# Patient Record
Sex: FEMALE | Race: WHITE | NOT HISPANIC OR LATINO | Employment: OTHER | URBAN - METROPOLITAN AREA
[De-identification: names, ages, dates, MRNs, and addresses within clinical notes are randomized per-mention and may not be internally consistent; named-entity substitution may affect disease eponyms.]

---

## 2017-01-09 DIAGNOSIS — M1A.09X0 IDIOPATHIC CHRONIC GOUT OF MULTIPLE SITES WITHOUT TOPHUS: ICD-10-CM

## 2017-01-09 RX ORDER — ALLOPURINOL 100 MG/1
TABLET ORAL
Qty: 180 TABLET | Refills: 0 | Status: SHIPPED | OUTPATIENT
Start: 2017-01-09 | End: 2017-04-30 | Stop reason: SDUPTHER

## 2017-01-10 ENCOUNTER — TELEPHONE (OUTPATIENT)
Dept: CARDIOLOGY | Facility: CLINIC | Age: 77
End: 2017-01-10

## 2017-01-10 ENCOUNTER — TELEPHONE (OUTPATIENT)
Dept: INTERNAL MEDICINE | Facility: CLINIC | Age: 77
End: 2017-01-10

## 2017-01-10 NOTE — TELEPHONE ENCOUNTER
----- Message from Jo Ann Johnson sent at 1/10/2017 10:56 AM CST -----  Contact: pt  Dentist Pamela Layton with [facility] [phone] fax [phone] sent requests to both Sahrona Long and [place] for clearance to extract a tooth on Fri 1/6, and has had no response from either doctor.  Pt is in pain and would like to have this expedited.  Please call pt ASAP today at [phone] to let her what the status is.

## 2017-01-10 NOTE — TELEPHONE ENCOUNTER
I have not seen anything regarding this. I saw patient in November. I know of no contraindication to her having a tooth extraction as long as her blood pressure is under control. We do not have recent blood pressure measurement. She is also followed by Dr. Long and is taking plavix, so I defer to his recommendations regarding anticoagulant.

## 2017-01-10 NOTE — TELEPHONE ENCOUNTER
----- Message from Jo Ann Johnson sent at 1/10/2017 10:56 AM CST -----  Contact: pt  Dentist Pamela Layton with [facility] [phone] fax [phone] sent requests to both Sharona Long and [place] for clearance to extract a tooth on Fri 1/6, and has had no response from either doctor.  Pt is in pain and would like to have this expedited.  Please call pt ASAP today at [phone] to let her what the status is.

## 2017-01-10 NOTE — TELEPHONE ENCOUNTER
Please see message below. We have not received anything regarding this patient other than this message. You've only seen her once 11.16.16 for situational anxiety. Please advise.

## 2017-01-11 ENCOUNTER — OFFICE VISIT (OUTPATIENT)
Dept: INTERNAL MEDICINE | Facility: CLINIC | Age: 77
End: 2017-01-11
Payer: MEDICARE

## 2017-01-11 VITALS
BODY MASS INDEX: 31.42 KG/M2 | HEART RATE: 90 BPM | WEIGHT: 184.06 LBS | SYSTOLIC BLOOD PRESSURE: 130 MMHG | TEMPERATURE: 98 F | OXYGEN SATURATION: 97 % | HEIGHT: 64 IN | DIASTOLIC BLOOD PRESSURE: 80 MMHG

## 2017-01-11 DIAGNOSIS — E03.9 HYPOTHYROIDISM (ACQUIRED): ICD-10-CM

## 2017-01-11 DIAGNOSIS — I10 ESSENTIAL HYPERTENSION: Primary | ICD-10-CM

## 2017-01-11 DIAGNOSIS — K08.89 PAIN, DENTAL: ICD-10-CM

## 2017-01-11 PROCEDURE — 1157F ADVNC CARE PLAN IN RCRD: CPT | Mod: S$GLB,,, | Performed by: FAMILY MEDICINE

## 2017-01-11 PROCEDURE — 1160F RVW MEDS BY RX/DR IN RCRD: CPT | Mod: S$GLB,,, | Performed by: FAMILY MEDICINE

## 2017-01-11 PROCEDURE — 99214 OFFICE O/P EST MOD 30 MIN: CPT | Mod: S$GLB,,, | Performed by: FAMILY MEDICINE

## 2017-01-11 PROCEDURE — 3079F DIAST BP 80-89 MM HG: CPT | Mod: S$GLB,,, | Performed by: FAMILY MEDICINE

## 2017-01-11 PROCEDURE — 99499 UNLISTED E&M SERVICE: CPT | Mod: S$GLB,,, | Performed by: FAMILY MEDICINE

## 2017-01-11 PROCEDURE — 1159F MED LIST DOCD IN RCRD: CPT | Mod: S$GLB,,, | Performed by: FAMILY MEDICINE

## 2017-01-11 PROCEDURE — 99999 PR PBB SHADOW E&M-EST. PATIENT-LVL III: CPT | Mod: PBBFAC,,, | Performed by: FAMILY MEDICINE

## 2017-01-11 PROCEDURE — 3075F SYST BP GE 130 - 139MM HG: CPT | Mod: S$GLB,,, | Performed by: FAMILY MEDICINE

## 2017-01-11 PROCEDURE — 1126F AMNT PAIN NOTED NONE PRSNT: CPT | Mod: S$GLB,,, | Performed by: FAMILY MEDICINE

## 2017-01-11 RX ORDER — LEVOTHYROXINE SODIUM 75 UG/1
TABLET ORAL
Qty: 90 TABLET | Refills: 1 | Status: SHIPPED | OUTPATIENT
Start: 2017-01-11 | End: 2017-08-08 | Stop reason: SDUPTHER

## 2017-01-11 RX ORDER — AMOXICILLIN 500 MG/1
CAPSULE ORAL
Refills: 0 | COMMUNITY
Start: 2017-01-06 | End: 2017-02-01

## 2017-01-11 NOTE — MR AVS SNAPSHOT
Replaced by [facility]  [address] [address]  Phone: [phone]  Fax: [phone]                  Violet Swenson   2017 11:20 AM   Office Visit    Description:  Female : 1940   Provider:  Marti Coronel MD   Department:  [facility]           Reason for Visit     Dental Pain           Diagnoses this Visit        Comments    Essential hypertension    -  Primary controlled, continue current medications    Hypothyroidism (acquired)     controlled, continue synthroid    Pain, dental     may proceed with dental extraction, recommendations regarding plavix per Dr. Long, Cardiology           To Do List           Future Appointments        Provider Department Dept Phone    2017 9:40 AM LABORATORY, [address] [facility] [phone]    2017 8:30 AM Dilshad Rogers MD [facility] - Rheumatology [phone]    2017 8:20 AM Marti Coronel MD [facility] Medicine [phone]      Goals (5 Years of Data)     None      Follow-Up and Disposition     Return if symptoms worsen or fail to improve, for keep routine follow up.    Follow-up and Disposition History       These Medications        Disp Refills Start End    levothyroxine (SYNTHROID) 75 MCG tablet 90 tablet 1 2017     TAKE 1 TABLET(75 MCG) BY MOUTH BEFORE BREAKFAST    Pharmacy: [facility] [address] [place] [address] Ph #: [phone]         [facility] On Call     [facility] Nurse Care Line -  Assistance  Registered nurses in the [facility] provide clinical advisement, health education, appointment booking, and other advisory services.  Call for this free service at [phone].             Medications           Message regarding Medications     Verify the changes and/or additions to your medication regime listed below are the same as discussed with your  clinician today.  If any of these changes or additions are incorrect, please notify your healthcare provider.             Verify that the below list of medications is an accurate representation of the medications you are currently taking.  If none reported, the list may be blank. If incorrect, please contact your healthcare provider. Carry this list with you in case of emergency.           Current Medications     allopurinol (ZYLOPRIM) 100 MG tablet TAKE 2 TABLET BY MOUTH DAILY    amoxicillin (AMOXIL) 500 MG capsule TK ONE C PO TID    aspirin (ECOTRIN) 81 MG EC tablet Take 81 mg by mouth once daily.     clopidogrel (PLAVIX) 75 mg tablet TAKE 1 TABLET BY MOUTH EVERY DAY    colchicine 0.6 mg tablet TAKE 1 TABLET(0.6 MG) BY MOUTH EVERY DAY    ergocalciferol, vitamin D2, 400 unit Tab Take by mouth daily.    fluocinolone acetonide oil 0.01 % Drop Place 5 drops in ear(s) 2 (two) times daily. 5 drops in affected ear bid x 7-14 days    fluticasone (FLONASE) 50 mcg/actuation nasal spray 2 sprays by Each Nare route once daily.    gabapentin (NEURONTIN) 100 MG capsule Take 1 capsule (100 mg total) by mouth 3 (three) times daily.    hydrochlorothiazide (HYDRODIURIL) 12.5 MG Tab TAKE 1 TABLET BY MOUTH ONCE DAILY    isosorbide mononitrate (IMDUR) 30 MG 24 hr tablet TAKE 1 TABLET BY MOUTH EVERY DAY    levothyroxine (SYNTHROID) 75 MCG tablet TAKE 1 TABLET(75 MCG) BY MOUTH BEFORE BREAKFAST    meloxicam (MOBIC) 7.5 MG tablet TAKE 1 TABLET(7.5 MG) BY MOUTH DAILY AS NEEDED FOR PAIN    nitroglycerin 400 mcg/spray SprA Place onto the tongue.    ranitidine (ZANTAC) 150 MG tablet Take 150 mg by mouth nightly.    rosuvastatin (CRESTOR) 10 MG tablet Take 1 tablet (10 mg total) by mouth once daily.    sertraline (ZOLOFT) 100 MG tablet Take 1 tablet (100 mg total) by mouth once daily.    turmeric root extract 500 mg Cap Take 500 mg by mouth 2 (two) times daily.    verapamil (CALAN-SR) 120 MG CR tablet Take 1 tablet (120 mg total) by mouth  "nightly. One-half tablet by mouth nightly    ZINC ACETATE ORAL 250 mg.    meclizine (ANTIVERT) 25 mg tablet Take 1 tablet (25 mg total) by mouth 3 (three) times daily as needed for Dizziness.           Clinical Reference Information           Vital Signs - Last Recorded  Most recent update: 1/11/2017 11:28 AM by Chelsie Fox    BP Pulse Temp Ht Wt SpO2    130/80 (BP Location: Right arm, Patient Position: Sitting) 90 97.7 °F (36.5 °C) 5' 4" (1.626 m) 83.5 kg (184 lb 1.4 oz) 97%    BMI                31.6 kg/m2          Blood Pressure          Most Recent Value    BP  130/80      Allergies as of 1/11/2017     Corticosteroids (Glucocorticoids)    Oxycodone      Immunizations Administered on Date of Encounter - 1/11/2017     None      "

## 2017-01-11 NOTE — PROGRESS NOTES
Subjective:       Patient ID: Violet Swenson is a 76 y.o. female.    Chief Complaint: Dental Pain    HPI Comments: Patient presents to clinic today for clearance for tooth extraction with Dr. Pamela Layton. She also requests refill of synthroid. In addition, she reports still having trouble with painful corn on her foot. She saw podiatry and they advised filing down as needed. She states that is uncomfortable.    Review of Systems   Constitutional: Negative for chills, fatigue, fever and unexpected weight change.   HENT: Positive for dental problem.    Eyes: Negative for visual disturbance.   Respiratory: Negative for shortness of breath.    Cardiovascular: Negative for chest pain.   Musculoskeletal: Negative for myalgias.   Neurological: Negative for headaches.       Objective:      Physical Exam   Constitutional: She is oriented to person, place, and time. She appears well-developed and well-nourished. No distress.   HENT:   Head: Normocephalic and atraumatic.   Eyes: Conjunctivae and EOM are normal. Pupils are equal, round, and reactive to light.   Neurological: She is alert and oriented to person, place, and time.   Psychiatric: She has a normal mood and affect.   Vitals reviewed.      Assessment:       1. Essential hypertension    2. Hypothyroidism (acquired)    3. Pain, dental        Plan:   Violet was seen today for dental pain.    Diagnoses and all orders for this visit:    Essential hypertension  Comments:  controlled, continue current medications    Hypothyroidism (acquired)  Comments:  controlled, continue synthroid  Orders:  -     levothyroxine (SYNTHROID) 75 MCG tablet; TAKE 1 TABLET(75 MCG) BY MOUTH BEFORE BREAKFAST    Pain, dental  Comments:  may proceed with dental extraction, recommendations regarding plavix per Dr. Long, Cardiology    - advised to notify Dr. Mehta's staff regarding issue with corn on foot to see if they can offer other options.

## 2017-01-17 ENCOUNTER — TELEPHONE (OUTPATIENT)
Dept: CARDIOLOGY | Facility: CLINIC | Age: 77
End: 2017-01-17

## 2017-01-17 NOTE — TELEPHONE ENCOUNTER
Spoke with patient--states needs clearance to have tooth extraction done at [facility]--advised patient that I will send message to Dr. Long and give her a call back when he responds--patient verbalizes understanding

## 2017-01-17 NOTE — TELEPHONE ENCOUNTER
[facility] ([phone] ph  [phone] fax) sent a medical clearance form for dental treatment (tooth extractions) needs to discontinue Plavix 4-5 days prior to extraction--needs to know if any other special precautions that needs to be taken and any known risks during procedure--please advise. Thanks.

## 2017-01-17 NOTE — TELEPHONE ENCOUNTER
----- Message from Gayatri España sent at 1/17/2017  9:29 AM CST -----  Contact: pt  Pt would like to speak to the nurse please today(pt will elaborate)....[phone]

## 2017-01-23 DIAGNOSIS — M15.9 PRIMARY OSTEOARTHRITIS INVOLVING MULTIPLE JOINTS: ICD-10-CM

## 2017-01-23 RX ORDER — MELOXICAM 7.5 MG/1
TABLET ORAL
Qty: 90 TABLET | Refills: 0 | Status: SHIPPED | OUTPATIENT
Start: 2017-01-23 | End: 2017-05-23 | Stop reason: SDUPTHER

## 2017-01-25 ENCOUNTER — LAB VISIT (OUTPATIENT)
Dept: LAB | Facility: HOSPITAL | Age: 77
End: 2017-01-25
Attending: INTERNAL MEDICINE
Payer: MEDICARE

## 2017-01-25 ENCOUNTER — TELEPHONE (OUTPATIENT)
Dept: RHEUMATOLOGY | Facility: CLINIC | Age: 77
End: 2017-01-25

## 2017-01-25 DIAGNOSIS — M1A.09X0 IDIOPATHIC CHRONIC GOUT OF MULTIPLE SITES WITHOUT TOPHUS: ICD-10-CM

## 2017-01-25 LAB
ALBUMIN SERPL BCP-MCNC: 3.7 G/DL
ALP SERPL-CCNC: 72 U/L
ALT SERPL W/O P-5'-P-CCNC: 26 U/L
ANION GAP SERPL CALC-SCNC: 8 MMOL/L
AST SERPL-CCNC: 32 U/L
BILIRUB SERPL-MCNC: 0.4 MG/DL
BUN SERPL-MCNC: 25 MG/DL
CALCIUM SERPL-MCNC: 9.5 MG/DL
CHLORIDE SERPL-SCNC: 109 MMOL/L
CO2 SERPL-SCNC: 26 MMOL/L
CREAT SERPL-MCNC: 1.1 MG/DL
EST. GFR  (AFRICAN AMERICAN): 57 ML/MIN/1.73 M^2
EST. GFR  (NON AFRICAN AMERICAN): 49 ML/MIN/1.73 M^2
GLUCOSE SERPL-MCNC: 93 MG/DL
POTASSIUM SERPL-SCNC: 5.6 MMOL/L
PROT SERPL-MCNC: 7 G/DL
SODIUM SERPL-SCNC: 143 MMOL/L
URATE SERPL-MCNC: 5.6 MG/DL

## 2017-01-25 PROCEDURE — 36415 COLL VENOUS BLD VENIPUNCTURE: CPT

## 2017-01-25 PROCEDURE — 80053 COMPREHEN METABOLIC PANEL: CPT

## 2017-01-25 PROCEDURE — 84550 ASSAY OF BLOOD/URIC ACID: CPT

## 2017-01-25 NOTE — TELEPHONE ENCOUNTER
----- Message from Dilshad Rogers MD sent at 1/25/2017 11:25 AM CST -----  [facility] labs.

## 2017-01-30 DIAGNOSIS — M79.2 NEUROPATHIC PAIN OF FOOT, RIGHT: ICD-10-CM

## 2017-01-30 RX ORDER — GABAPENTIN 100 MG/1
CAPSULE ORAL
Qty: 90 CAPSULE | Refills: 0 | Status: SHIPPED | OUTPATIENT
Start: 2017-01-30 | End: 2017-03-01 | Stop reason: SDUPTHER

## 2017-02-01 ENCOUNTER — OFFICE VISIT (OUTPATIENT)
Dept: PHYSICAL MEDICINE AND REHAB | Facility: CLINIC | Age: 77
End: 2017-02-01
Payer: MEDICARE

## 2017-02-01 ENCOUNTER — OFFICE VISIT (OUTPATIENT)
Dept: RHEUMATOLOGY | Facility: CLINIC | Age: 77
End: 2017-02-01
Payer: MEDICARE

## 2017-02-01 VITALS
SYSTOLIC BLOOD PRESSURE: 120 MMHG | BODY MASS INDEX: 31.92 KG/M2 | DIASTOLIC BLOOD PRESSURE: 74 MMHG | HEIGHT: 64 IN | RESPIRATION RATE: 14 BRPM | WEIGHT: 187 LBS | HEART RATE: 92 BPM

## 2017-02-01 VITALS
DIASTOLIC BLOOD PRESSURE: 85 MMHG | HEART RATE: 90 BPM | WEIGHT: 187.38 LBS | BODY MASS INDEX: 32.17 KG/M2 | SYSTOLIC BLOOD PRESSURE: 138 MMHG

## 2017-02-01 DIAGNOSIS — R29.898 ARM WEAKNESS: ICD-10-CM

## 2017-02-01 DIAGNOSIS — Z79.1 NSAID LONG-TERM USE: ICD-10-CM

## 2017-02-01 DIAGNOSIS — M79.18 MYOFASCIAL PAIN: Primary | ICD-10-CM

## 2017-02-01 DIAGNOSIS — M65.842 STENOSING TENOSYNOVITIS OF FINGER OF LEFT HAND: Primary | ICD-10-CM

## 2017-02-01 DIAGNOSIS — M15.9 PRIMARY OSTEOARTHRITIS INVOLVING MULTIPLE JOINTS: ICD-10-CM

## 2017-02-01 DIAGNOSIS — M1A.09X0 IDIOPATHIC CHRONIC GOUT OF MULTIPLE SITES WITHOUT TOPHUS: ICD-10-CM

## 2017-02-01 PROCEDURE — 1157F ADVNC CARE PLAN IN RCRD: CPT | Mod: S$GLB,,, | Performed by: INTERNAL MEDICINE

## 2017-02-01 PROCEDURE — 99999 PR PBB SHADOW E&M-EST. PATIENT-LVL III: CPT | Mod: PBBFAC,,, | Performed by: PHYSICAL MEDICINE & REHABILITATION

## 2017-02-01 PROCEDURE — 1157F ADVNC CARE PLAN IN RCRD: CPT | Mod: S$GLB,,, | Performed by: PHYSICAL MEDICINE & REHABILITATION

## 2017-02-01 PROCEDURE — 3079F DIAST BP 80-89 MM HG: CPT | Mod: S$GLB,,, | Performed by: INTERNAL MEDICINE

## 2017-02-01 PROCEDURE — 99499 UNLISTED E&M SERVICE: CPT | Mod: S$GLB,,, | Performed by: INTERNAL MEDICINE

## 2017-02-01 PROCEDURE — 99999 PR PBB SHADOW E&M-EST. PATIENT-LVL III: CPT | Mod: PBBFAC,,, | Performed by: INTERNAL MEDICINE

## 2017-02-01 PROCEDURE — 1159F MED LIST DOCD IN RCRD: CPT | Mod: S$GLB,,, | Performed by: INTERNAL MEDICINE

## 2017-02-01 PROCEDURE — 1125F AMNT PAIN NOTED PAIN PRSNT: CPT | Mod: S$GLB,,, | Performed by: INTERNAL MEDICINE

## 2017-02-01 PROCEDURE — 3074F SYST BP LT 130 MM HG: CPT | Mod: S$GLB,,, | Performed by: PHYSICAL MEDICINE & REHABILITATION

## 2017-02-01 PROCEDURE — 1160F RVW MEDS BY RX/DR IN RCRD: CPT | Mod: S$GLB,,, | Performed by: INTERNAL MEDICINE

## 2017-02-01 PROCEDURE — 20550 NJX 1 TENDON SHEATH/LIGAMENT: CPT | Mod: LT,S$GLB,, | Performed by: INTERNAL MEDICINE

## 2017-02-01 PROCEDURE — 99214 OFFICE O/P EST MOD 30 MIN: CPT | Mod: 25,S$GLB,, | Performed by: INTERNAL MEDICINE

## 2017-02-01 PROCEDURE — 3078F DIAST BP <80 MM HG: CPT | Mod: S$GLB,,, | Performed by: PHYSICAL MEDICINE & REHABILITATION

## 2017-02-01 PROCEDURE — 1160F RVW MEDS BY RX/DR IN RCRD: CPT | Mod: S$GLB,,, | Performed by: PHYSICAL MEDICINE & REHABILITATION

## 2017-02-01 PROCEDURE — 99204 OFFICE O/P NEW MOD 45 MIN: CPT | Mod: S$GLB,,, | Performed by: PHYSICAL MEDICINE & REHABILITATION

## 2017-02-01 PROCEDURE — 1125F AMNT PAIN NOTED PAIN PRSNT: CPT | Mod: S$GLB,,, | Performed by: PHYSICAL MEDICINE & REHABILITATION

## 2017-02-01 PROCEDURE — 1159F MED LIST DOCD IN RCRD: CPT | Mod: S$GLB,,, | Performed by: PHYSICAL MEDICINE & REHABILITATION

## 2017-02-01 PROCEDURE — 3075F SYST BP GE 130 - 139MM HG: CPT | Mod: S$GLB,,, | Performed by: INTERNAL MEDICINE

## 2017-02-01 RX ORDER — TRIAMCINOLONE ACETONIDE 40 MG/ML
20 INJECTION, SUSPENSION INTRA-ARTICULAR; INTRAMUSCULAR
Status: COMPLETED | OUTPATIENT
Start: 2017-02-01 | End: 2017-02-01

## 2017-02-01 RX ORDER — TIZANIDINE 2 MG/1
2 TABLET ORAL NIGHTLY PRN
Qty: 30 TABLET | Refills: 1 | Status: SHIPPED | OUTPATIENT
Start: 2017-02-01 | End: 2017-03-30

## 2017-02-01 RX ADMIN — TRIAMCINOLONE ACETONIDE 20 MG: 40 INJECTION, SUSPENSION INTRA-ARTICULAR; INTRAMUSCULAR at 09:02

## 2017-02-01 NOTE — PROGRESS NOTES
RHEUMATOLOGY CLINIC FOLLOW UP VISIT  Chief complaints:-  My finger gets stuck when I make a fist and hurts. My thumb hurts too.     HPI:-  Violet Zhao a 76 y.o. pleasant female comes in for a follow up visit with above chief complaints. She has chronic non tophaceous gout, osteoarthritis and peripheral neuropathy. Her peripheral neuropathy responded well to low dose gabapentin. She still uses prn NSAID's for OA related pain over joints including her thumbs and large joints. She complains of worsening pain and triggering of the middle finger of left hand since the past week. She denies any injury. Rest of the history remains the same.       Review of Systems   Constitutional: Negative for chills, fever and weight loss.   HENT: Negative for ear discharge, ear pain, hearing loss, nosebleeds and sore throat.    Eyes: Negative for blurred vision, double vision, photophobia, discharge and redness.   Respiratory: Negative for cough, hemoptysis, sputum production and shortness of breath.    Cardiovascular: Negative for chest pain, palpitations and claudication.   Gastrointestinal: Negative for abdominal pain, constipation, diarrhea, melena, nausea and vomiting.   Genitourinary: Negative for dysuria, frequency, hematuria and urgency.   Musculoskeletal: Positive for back pain and joint pain. Negative for falls, myalgias and neck pain.   Skin: Negative for itching and rash.   Neurological: Negative for dizziness, tremors, sensory change, speech change, focal weakness, seizures, loss of consciousness, weakness and headaches.   Endo/Heme/Allergies: Negative for environmental allergies. Does not bruise/bleed easily.   Psychiatric/Behavioral: Negative for hallucinations and memory loss. The patient does not have insomnia.        Past Medical History   Diagnosis Date    Arthritis     Coronary artery disease     Depression     GERD (gastroesophageal reflux  disease)     Gout, arthritis     Hyperlipidemia     Hypertension     Hypothyroidism     Lung nodule 2014     RML--stable    Pneumonia        Past Surgical History   Procedure Laterality Date    Cholecystectomy      Tonsillectomy      Appendectomy      Hernia repair      Gastric bypass      Total knee arthroplasty Bilateral     Coronary stent placement  02/05/2014    Cardiac pacemaker placement  01/22/2015    Coronary angioplasty  02/2014        Social History   Substance Use Topics    Smoking status: Former Smoker    Smokeless tobacco: None    Alcohol use No       Family History   Problem Relation Age of Onset    Heart disease Mother     Hypertension Mother     Stomach cancer Father     Pancreatic cancer Sister     Stomach cancer Brother        Review of patient's allergies indicates:   Allergen Reactions    Corticosteroids (glucocorticoids) Nausea Only and Other (See Comments)     Stomach pain, dizziness, headache    Oxycodone Other (See Comments)     Blood pressure dropped           Physical examination:-    Vitals:    02/01/17 0848   BP: 138/85   Pulse: 90   Weight: 85 kg (187 lb 6.3 oz)   PainSc:   9   PainLoc: Generalized       Physical Exam   Constitutional: She is oriented to person, place, and time and well-developed, well-nourished, and in no distress.   HENT:   Head: Normocephalic.   Mouth/Throat: Oropharynx is clear and moist.   Eyes: Conjunctivae are normal. Pupils are equal, round, and reactive to light.   Neck: Normal range of motion. Neck supple.   Cardiovascular: Normal rate and intact distal pulses.    Pulmonary/Chest: Effort normal. No respiratory distress. She exhibits no tenderness.   Abdominal: Soft. There is no tenderness.   Musculoskeletal:   Minimal Heberden's and Laura's nodes in her hands and crepitus in large joints suggestive of osteoarthritis. Tenderness over A1 pulley of left middle finger.    Neurological: She is alert and oriented to person, place, and  time. No cranial nerve deficit.   Skin: Skin is warm. She is not diaphoretic. No erythema.   Psychiatric: Affect and judgment normal.   Nursing note and vitals reviewed.      Medication List with Changes/Refills   New Medications    BAICALIN-CATECHIN 500 MG CAP    Take 500 mg by mouth 2 (two) times daily.   Current Medications    ALLOPURINOL (ZYLOPRIM) 100 MG TABLET    TAKE 2 TABLET BY MOUTH DAILY    AMOXICILLIN (AMOXIL) 500 MG CAPSULE    TK ONE C PO TID    ASPIRIN (ECOTRIN) 81 MG EC TABLET    Take 81 mg by mouth once daily.     CLOPIDOGREL (PLAVIX) 75 MG TABLET    TAKE 1 TABLET BY MOUTH EVERY DAY    COLCHICINE 0.6 MG TABLET    TAKE 1 TABLET(0.6 MG) BY MOUTH EVERY DAY    ERGOCALCIFEROL, VITAMIN D2, 400 UNIT TAB    Take by mouth daily.    FLUOCINOLONE ACETONIDE OIL 0.01 % DROP    Place 5 drops in ear(s) 2 (two) times daily. 5 drops in affected ear bid x 7-14 days    FLUTICASONE (FLONASE) 50 MCG/ACTUATION NASAL SPRAY    2 sprays by Each Nare route once daily.    GABAPENTIN (NEURONTIN) 100 MG CAPSULE    TAKE 1 CAPSULE(100 MG) BY MOUTH THREE TIMES DAILY    HYDROCHLOROTHIAZIDE (HYDRODIURIL) 12.5 MG TAB    TAKE 1 TABLET BY MOUTH ONCE DAILY    ISOSORBIDE MONONITRATE (IMDUR) 30 MG 24 HR TABLET    TAKE 1 TABLET BY MOUTH EVERY DAY    LEVOTHYROXINE (SYNTHROID) 75 MCG TABLET    TAKE 1 TABLET(75 MCG) BY MOUTH BEFORE BREAKFAST    MECLIZINE (ANTIVERT) 25 MG TABLET    Take 1 tablet (25 mg total) by mouth 3 (three) times daily as needed for Dizziness.    MELOXICAM (MOBIC) 7.5 MG TABLET    TAKE 1 TABLET BY MOUTH ONCE DAILY AS NEEDED FOR PAIN    NITROGLYCERIN 400 MCG/SPRAY SPRA    Place onto the tongue.    RANITIDINE (ZANTAC) 150 MG TABLET    Take 150 mg by mouth nightly.    ROSUVASTATIN (CRESTOR) 10 MG TABLET    Take 1 tablet (10 mg total) by mouth once daily.    SERTRALINE (ZOLOFT) 100 MG TABLET    Take 1 tablet (100 mg total) by mouth once daily.    TURMERIC ROOT EXTRACT 500 MG CAP    Take 500 mg by mouth 2 (two) times daily.     VERAPAMIL (CALAN-SR) 120 MG CR TABLET    Take 1 tablet (120 mg total) by mouth nightly. One-half tablet by mouth nightly    ZINC ACETATE ORAL    250 mg.       Assessment/Plans:-  # Idiopathic chronic gout of multiple sites without tophus  Stable. No flare ups. Uric acid at goal on current dose of allopurinol. Continue colchicine for now to prevent attacks. Will consider to discontinue it in future visit.     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 Start stretching exercises after 48 hours.   - triamcinolone acetonide injection 20 mg; 0.5 mLs (20 mg total) by Other route one time.    # Primary osteoarthritis involving multiple joints  On long term NSAID's. Not responding to low dose mobic. Try limbrel to reduce chances of Renal/GI/CVD side effects.   - baicalin-catechin 500 mg Cap; Take 500 mg by mouth 2 (two) times daily.  Dispense: 60 each; Refill: 5    # NSAID long-term use  No response to mobic. Stop prn OTC NSAID use . Try limbrel .  - baicalin-catechin 500 mg Cap; Take 500 mg by mouth 2 (two) times daily.  Dispense: 60 each; Refill: 5    PROCEDURE NOTE:-  Name of the procedure: Left third flexor tendon- trigger finger injection.   Patient consent:   Indication, risks(including skin depigmentation, fat atrophy, infection, bleeding, nerve or tendon injury) and alternative to the procedure were explained to to the patient. Patient was given opportunity to ask questions . Then patient gave a verbal consent for the procedure.   Pertinent lab values: None indicated  Type of anesthesia: 2% Lidocaine   Description of procedure : Using sterile technique, the skin over the base of left third finger - just above the A1 pulley was cleaned with alcohol swab and chlorhexidine solution. Then 1/2 cc of lidocaine and 1/2 cc of kenalog was injected into the subcutaneous space with immediate pain relief for the patient. She started making fists almost instantly without any pain.  Complication:  None  Estimated blood loss: None  Disposition: Patient tolerated the procedure without any complains and the site was dressed.There were no complications.  Discharge instructions of icing and stretching given.      # Return in about 6 months (around 8/1/2017).    Disclaimer: This note was prepared using voice recognition system and is likely to have sound alike errors and is not proof read.  Please call me with any questions.

## 2017-02-01 NOTE — LETTER
February 1, 2017      Dilshad Rogers MD  [address] [place] [address]           [facility] - Physiatry  [address] [address]  Phone: [phone]  Fax: [phone]          Patient: Violet Swenson   MR Number: [id number]   YOB: 1940   Date of Visit: 2/1/2017       Dear Dr. Dilshad Rogers:    Thank you for referring Violet Swenson to me for evaluation. Attached you will find relevant portions of my assessment and plan of care.    If you have questions, please do not hesitate to call me. I look forward to following Violet Swenson along with you.    Sincerely,    Lorraine Rizo MD    Enclosure  CC:  No Recipients    If you would like to receive this communication electronically, please contact [email or web address] or [phone] to request more information on [facility] [employer] access.    For providers and/or their staff who would like to refer a patient to [facility], please contact us through our one-stop-shop provider referral line, [facility], at [phone].    If you feel you have received this communication in error or would no longer like to receive these types of communications, please e-mail [email or web address]

## 2017-02-01 NOTE — PATIENT INSTRUCTIONS
Myofascial Pain Syndrome: Fibrositis  Your pain is caused by a state of chronic muscle tension. This condition is called by various names: myofascial pain, fibrositis and trigger point pain. This can also be due to mechanical stress (such as working at a computer terminal for long periods; or work that requires repetitive motions of the arms or hands) or emotional stress (such as problems on the job or in your personal life). Sometimes there is no obvious cause. The pain can occur in the area of the muscle spasm or at a site distant to it. For example, spasm of a neck muscle can cause headache. Spasm of the muscle near the shoulder blade can cause pain shooting down the arm.  Home Care:  · Try to identify the factors that may be causing your problem and change them:  ¨ If you feel that emotional stress is a cause of your pain, learn methods to deal more effectively with the stress in your life. These may include regular exercise, muscle relaxation techniques, meditation or simply taking time out for yourself. Consult your doctor or go to a local bookstore and review the many books and tapes available on the subject of stress reduction.  ¨ If you feel that physical stress is a cause for your pain, try to modify any poor work habits.  · You may use acetaminophen (Tylenol) or ibuprofen (Motrin, Advil) to control pain, unless another medicine was prescribed. [NOTE: If you have chronic liver or kidney disease or ever had a stomach ulcer or GI bleeding, talk with your doctor before using these medicines.]  · The use of heat to the muscle (hot compress or heating pad) will be helpful to reduce muscle spasm. Some persons get relief with ice packs. Apply an ice pack (crushed or cubed ice in a plastic bag, wrapped in a towel) for 20 minutes at a time as needed. Use the method that feels best to you.  · Massaging the trigger point and stretching out the muscle are an important parts of prevention and treatment. Trigger point  massage can be done by first applying heat to the area to warm and prepare the muscle. Have someone apply steady thumb pressure directly on the knot in the muscle (the most tender point) for 30 seconds. Release the pressure, then massage the surrounding muscle. Repeat the process, applying more pressure to the trigger point each time. Do this up to the limit of pain. With each treatment, the trigger point should become less tender and the pain should decrease. You can apply local pressure to trigger points in the back by lying on the floor with a tennis ball under the trigger point.  Follow Up  with your doctor as advised or if not improving within the next week. It may be necessary for you to receive physical therapy if you do not respond to home treatment alone.  Get Prompt Medical Attention  if any of the following occur:  · If your trigger point is in the chest muscles, observe for pain that becomes more severe, lasts longer, or spreads into your shoulder/arm, neck or back; you develop trouble breathing, sweating, nausea or vomiting in association with chest pain  · If you develop weakness or numbness in an extremity  · If your pain worsens, regardless of its location  © 5523-2745 [employer]. [address] [place] [address]. All rights reserved. This information is not intended as a substitute for professional medical care. Always follow your healthcare professional's instructions.          Trigger Point Injection  The cause of your muscle pain or spasms may be one or more trigger points. Your health care provider may decide to inject the painful spots to relax the muscle. This can help relieve your pain. Relaxing the muscle can also make movement easier. You may then be able to exercise to strengthen the muscle and help it heal.    What is a trigger point?  A trigger point is a tight, painful knot of muscle fiber. It can form where a muscle is strained or injured. The knot can  sometimes be felt under the skin. A trigger point is very tender to the touch. Pain may also spread to other parts of the affected muscle. Muscles around a knee, shoulder blade, or other bones are prone to trigger points. This is because these muscles are more likely to be injured.    About the injections  Any muscle in the body can have one or more trigger points. Several injections may be needed in each trigger point to best relieve pain. These injections may be given in sessions about 2 weeks apart, depending on the preference of your health care provider. In some cases, you may not feel much change in your symptoms until after the third injection.     © 5948-7072 [employer]. [address] [place] [address]. All rights reserved. This information is not intended as a substitute for professional medical care. Always follow your healthcare professional's instructions.            Your Neck Muscles  The muscles in the neck and shoulders need to be strong to hold the neck and head in place. These muscles also help move the neck and shoulders. Your health care provider can recommend exercises to help stretch and strengthen your neck muscles.    © 0224-8105 [employer]. [address] [place] [address]. All rights reserved. This information is not intended as a substitute for professional medical care. Always follow your healthcare professional's instructions.          Neck Problems: Relieving Your Symptoms  The first goal of treatment is to relieve your symptoms. Your health care provider may recommend self-care treatments. These include resting, applying ice and heat, taking medication, and doing exercises. Your health care provider may also recommend that you see a physical therapist, who can teach you ways to care for and strengthen your neck.    Self-Care Treatments  Pain can end quickly or last awhile. Either way, youll want relief as soon as possible. Your health  care provider can tell you which treatments to do at home to help relieve your pain.  · Lying down for a short time takes pressure from the head off the neck.  · Ice and heat can help reduce pain. To bring down swelling, rest an ice pack wrapped in a thin towel on your neck for 15 minutes. To relax sore muscles, apply a warm, wet towel to the area. Or take a warm bath or shower.  · Over-the-counter medications, such as ibuprofen, naproxen, and aspirin, can help reduce pain and swelling. Acetaminophen can help relieve pain. Use these only as directed.  · Exercises can relax muscles and prevent stiffness. To prepare, drape a warm, wet towel around your neck and shoulders for 5 minutes. Remove the towel. Then do any exercises recommended to you by your health care provider.  Physical Therapy  If self-care treatments arent helping relieve neck pain, your health care provider may suggest one or more sessions of physical therapy. Physical therapy is performed by a specialist trained to treat injuries. Your physical therapist (PT) will teach you how to strengthen muscles, improve the spines alignment, and help you move properly. Treatment methods used in physical therapy may include:  · Heat. A special heating pad called a neck pack may be applied to your neck.  · Exercises. Your PT will teach you exercises to help strengthen your neck and improve its range of motion.  · Joint mobilization. The PT gently moves your vertebrae to help restore motion in your neck joints and reduce neck pain.  · Soft tissue mobilization. The PT massages and stretches the muscles in your neck and shoulders.  · Electrical stimulation. Electrical impulses are sent into your neck. This helps reduce soreness and inflammation.  · Education in body mechanics. The PT shows you ways to position and move your body that protect the neck.  Other Treatments  If physical therapy doesnt relieve your neck pain, your health care provider may suggest other  treatments. For example, medications or injections can help relieve pain and swelling. In some cases, surgery may be needed to treat neck problems.  © 0929-2007 [employer]. [address] [place] [address], [place] [address]. All rights reserved. This information is not intended as a substitute for professional medical care. Always follow your healthcare professional's instructions.          Understanding Neck Problems       If you suffer from neck pain, youre not alone. Many people have neck pain at some point in their lives. Problems such as poor posture, injury, and wear and tear can lead to neck pain. Your health care provider will work with you to find the treatment thats best for your neck.  Types of Neck Problems  The following problems can cause pain or injury in your neck:  · Strains and sprains: Strains (stretched or torn muscles) and sprains (stretched or torn ligaments) can cause neck pain. Strains and sprains can occur during an accident, or when you overuse your neck through repetitive motion. They can also cause your muscles and ligaments to become inflamed (swollen and painful).  · Whiplash and other injuries: Whiplash can result when an impact throws your head, forcing your neck too far forward (hyperflexion), then too far backward (hyperextension). When combined, the two motions can cause a painful injury to different parts of your neck, such as muscles, ligaments, or joints. The most common cause of whiplash is a car accident. But it can also happen during a fall or sports injury.  · Weakened disks: A simple action, such as a sneeze or a cough, can cause one of your disks to bulge (herniate). A herniated disk can put pressure on your nerve and cause pain. Over time, disks can also thin out (degenerate). Flattened disks dont cushion vertebrae well and can cause vertebrae to rub together. Rubbing vertebrae can pinch nerves and cause pain.  · Weakened joints: Aging and injury can cause joints  to slowly degenerate. Thinned joints can also cause vertebrae to rub together. This can cause abnormal growths of bone (bone spurs) to form on vertebrae. Bone spurs put pressure on nerves, causing pain.  Common Symptoms  If you have a neck problem, you may have one or more of the following symptoms:  · Muscle tension and spasm: You may not be able to move your neck, arms, or shoulders comfortably if you have muscle tension or stiffness in your neck. If your symptoms arent relieved, you may experience muscle spasms, or knots of contracted tissue (trigger points) in areas of your neck and shoulders.  · Aches and pains: Dull aches in your head or neck, sharp pains, and swelling of the soft tissue of your neck and shoulders are common symptoms. If theres pressure on the nerves in your neck, you may feel pain in your arms or hands (referred pain).  · Numbness or weakness: If you injure the nerves in your neck, you may experience numbness, tingling, or weakness in your shoulders, arms, or hands. These symptoms arise when disks or bone spurs press on the nerves in your neck.  © 2647-1579 [employer]. [address] [place] [address]. All rights reserved. This information is not intended as a substitute for professional medical care. Always follow your healthcare professional's instructions.          Neck Spasm [No Trauma]    Spasm of the neck muscles can occur after a sudden awkward neck movement. Sleeping with your neck in a crooked position can also cause spasm. Some persons respond to emotional stress by tensing the muscles of their neck, shoulders and upper back. If neck spasm lasts long enough, it can cause headache.  The treatment described below will usually help the pain to go away in 5-7 days. Pain that continues may require further evaluation or other types of treatment such as physical therapy.  Home Care:  1. Rest and relax the muscles. Use a comfortable pillow that supports the head  and keeps the spine in a neutral position. The position of the head should not be tilted forward or backward. A rolled up towel may help for a custom fit.  2. Some persons find relief with heat (hot shower, hot bath or heating pad) and massage, while others prefer cold packs (crushed or cubed ice in a plastic bag, wrapped in a towel). Try both and use the method that feels best for 20 minutes several times a day.  3. You may use acetaminophen (Tylenol) or ibuprofen (Motrin, Advil) to control pain, unless another medicine was prescribed. [NOTE: If you have chronic liver or kidney disease or ever had a stomach ulcer or GI bleeding, talk with your doctor before using these medicines.]  Follow Up  with your doctor or this facility if your symptoms do not show signs of improvement after one week. Physical therapy or further evaluation may be needed.  [NOTE: If x-rays were taken, they will be reviewed by a radiologist. You will be notified of any new findings that may affect your care.]  Return Promptly  or contact your doctor if any of the following occurs:  · Pain becomes worse or spreads into one or both arms  · Weakness or numbness in one or both arms  · Increasing headache with nausea or vomiting  · Fever over 100.4ºF (38.0ºC)  © 7504-3159 [employer]. [address] [place] [address], [place] [address]. All rights reserved. This information is not intended as a substitute for professional medical care. Always follow your healthcare professional's instructions.          Know Your Neck: The Cervical Spine  By learning about the parts of the neck, you can better understand your neck problem. The bones of the neck are called cervical vertebrae, commonly identified as C1 through C7. Together, they form a bony column called the spine. Vertebrae also protect the spinal cord, a pathway for messages to reach the brain. Surrounding the spine are soft tissues such as muscles, tendons, and nerves.        Flexibility Is  Key  For the neck to function normally, it has to be flexible enough to move without discomfort. A healthy neck can move easily in six different directions.    © 5257-7287 [employer]. [address] [place] [address], [place] [address]. All rights reserved. This information is not intended as a substitute for professional medical care. Always follow your healthcare professional's instructions.          Protecting Your Neck: Posture and Body Mechanics  Protecting your neck from injuries and pain involves practicing good posture and body mechanics. This may mean correcting bad habits you have related to the way you hold and move your body. The tips below can help you improve your posture and body mechanics.    What Is Posture and Why Does It Matter?  Posture is the way you hold your body. For many of us, this means hunching over, thrusting the chin forward, and slouching the shoulders. But this kind of poor posture keeps muscles from properly supporting the neck and puts stress on muscles, disks, ligaments, and joints in your neck. As a result, injury and pain can occur.  How Is Your Posture?  Use a full-length mirror to check your posture. To begin, stand normally. Then slowly back up against a wall. Is there space between your head and the wall? Do you slouch your shoulders? Is your chin pointing up or down? All these can cause neck pain and injury.  Improving Your Posture  Follow these steps to improve your posture:  · Pull your shoulders back.  · Think of the ears, shoulders, and hips as a series of dots. Now, adjust your body to connect the dots in a straight line.  · Keep your chin level.  What Are Body Mechanics and Why Do They Matter?  The way you move and position your body during daily activities is called body mechanics. Good body mechanics help protect the neck. This means learning the right ways to stand, sit, and even sleep. So do whats best for your neck and practice good body  mechanics.  Standing   To protect your neck while standing:  · Carry objects close to your body.  · Keep your ears and shoulders in a line while standing or walking.  · To lower yourself, bend at the knees with a straight back. Do this instead of looking down and reaching for objects.  · Work at eye level. Dont reach above your head or tilt your head back.  Sitting   To protect your neck while sitting:  · Set up your workstation so your monitor is at eye level. Also, use a document chung when viewing papers or books.  · Keep your knees at or slightly below the level of your hips.  · Sit up straight, with feet flat on the floor. If your feet dont touch the floor, use a footrest.  · Avoid sitting or driving for long periods. Take frequent breaks.  Sleeping   To protect your neck while sleeping:  · Sleep on your back with a pillow under your knees, or on your side with a pillow between bent knees. This helps align the spine.  · Avoid using pillows that are too high or too low. Instead, use a neck roll or pillow under your neck while you sleep to keep the neck straight.  · Sleep on a mattress that supports you, with a pillow under your neck.  © 1751-0428 [employer]. [address] [place] [address], [place] [address]. All rights reserved. This information is not intended as a substitute for professional medical care. Always follow your healthcare professional's instructions.          Exercises at Your Workstation: Eyes, Neck, and Head     Tired eyes? Stiff neck? A few easy moves can help prevent these kinds of problems. Take a few minutes during your day to do these exercises--right at your desk. They'll loosen up your muscles, keep you more alert, and make a big difference in how you work and feel.    For your eyes  Eye cup  · Lean forward with your elbows on your desk.  · Cup your hands and place them lightly over your closed eyes. Hold for a minute, while breathing deeply in and out.  · Slowly uncover and  open your eyes. Repeat 2 times.  Eye roll  · Close your eyes. Slowly roll your eyeballs clockwise all the way around. Repeat 3 times.  · Now slowly roll them all the way around counterclockwise. Repeat 3 times.  Eye rest  · Every 20 minutes, look away from the computer screen. Focus on an object at least 20 feet away. Stay focused on this object for a full 20 seconds.    For your neck and head  Warm-up  · Drop your head gently to your chest. While breathing in, slowly roll your head up to your left shoulder. While breathing out, slowly roll your head back to center. Repeat to the right.  · Repeat 3 times on each side.  Head tilt  · Sit up straight. Tuck in your chin.  · Slowly tip your head to the left. Return to the center. Then, tip your head to the right.  · Repeat 3 times on each side.    Head turn  · Sit up straight.  · Slowly turn your head and look over your left shoulder. Hold for a few seconds. Go back to the center, then repeat to your right.  · Repeat 3 times on each side.  © 5252-5915 [employer], [place]. [address] [place], [place] [id number]. All rights reserved. This information is not intended as a substitute for professional medical care. Always follow your healthcare professional's instructions.          Reach and Hold Exercise    Do this exercise on your hands and knees. Keep your knees under your hips and your hands under your shoulders. Keep your spine in a neutral position (not arched or sagging). Keep your ears in line with your shoulders. Hold for a few seconds before starting the exercise:  4. Tighten your abdominal muscles and raise one arm straight in front of you, palm down. Hold for 5 seconds, then lower. Repeat 5 times.  5. Do the exercise again, this time lifting your arm to the side. Repeat 5 times.  6. Do the exercise again, this time lifting your arm backward, palm up. Repeat 5 times.  Switch sides and do each exercise with the other arm.  © 9971-6625 [employer],  [employer]. [address] [place] [address]. All rights reserved. This information is not intended as a substitute for professional medical care. Always follow your healthcare professional's instructions.        Shoulder and Upper Back Stretch  To start, stand tall with your ears, shoulders, and hips in line. Your feet should be slightly apart, positioned just under your hips. Focus your eyes directly in front of you.  this position for a few seconds before starting your exercise. This helps increase your awareness of proper posture.  Reach overhead and slightly back with both arms. Keep your shoulders and neck aligned and your elbows behind your shoulders:  · With your palms facing the ceiling, turn your fingers inward.  · Take a deep breath. Breathe out, and lower your elbows toward your buttocks. Hold for 5 seconds, then return to starting position.  · Repeat 3 times.    © 1432-0926 [employer]. [address] [place] [address]. All rights reserved. This information is not intended as a substitute for professional medical care. Always follow your healthcare professional's instructions.          Shoulder Clock Exercise  To start, stand tall with your ears, shoulders, and hips in line. Your feet should be slightly apart, positioned just under your hips. Focus your eyes directly in front of you.  this position for a few seconds before starting your exercise. This helps increase your awareness of proper posture.  · Imagine that your right shoulder is the center of a clock. With the outer point of your shoulder, roll it around to slowly trace the outer edge of the clock.  · Move clockwise first, then counterclockwise.  · Repeat 3 times. Switch shoulders.   © 3287-0688 [employer]. [address] [place] [address]. All rights reserved. This information is not intended as a substitute for professional medical care. Always follow your healthcare  professional's instructions.          Shoulder Girdle Stretch     To start, sit in a chair with your feet flat on the floor. Your weight should be slightly forward so that youre balanced evenly on your buttocks. Relax your shoulders and keep your head level. Using a chair with arms may help you keep your balance:  · Place 1 hand on the outside elbow of the other arm.  · Pull the arm across your body. Hold for 30 to 60 seconds. Repeat once.  · Switch sides.    © 7947-5371 [employer]. [address] [place] [address]. All rights reserved. This information is not intended as a substitute for professional medical care. Always follow your healthcare professional's instructions.          Shoulder Exercises      To start, sit in a chair with your feet flat on the floor. Your weight should be slightly forward so that youre balanced evenly on your buttocks. Relax your shoulders and keep your head level. Avoid arching your back or rounding your shoulders. Using a chair with arms may help you keep your balance.  · Raise your arms, elbows bent, to shoulder height.  · Slowly move your forearms together. Hold for 5 seconds.  · Return to starting position. Repeat 5 times.  © 8594-8717 [employer]. [address] [place] [address]. All rights reserved. This information is not intended as a substitute for professional medical care. Always follow your healthcare professional's instructions.        Shoulder Shrug Exercise  To start, sit in a chair with your feet flat on the floor. Shift your weight slightly forward to avoid rounding your back. Relax. Keep your ears, shoulders, and hips aligned:  · Raise both of your shoulders as high as you can, as if you were trying to touch them to your ears. Keep your head and neck still and relaxed.  · Hold for a count of 10. Release.  · Repeat 5 times.    © 1529-6807 [employer]. [address] [place] [address]. All rights  reserved. This information is not intended as a substitute for professional medical care. Always follow your healthcare professional's instructions.          Shoulder Squeeze Exercise     To start, sit in a chair with your feet flat on the floor. Shift your weight slightly forward to avoid rounding your back. Relax. Keep your ears, shoulders, and hips aligned:  · Raise your arms to shoulder height, elbows bent and palms forward.  · Move your arms back, squeezing your shoulder blades together.  · Hold for 10 seconds. Return to starting position.   · Repeat 5 times.     © 8705-4343 [employer]. [address] [place] [address]. All rights reserved. This information is not intended as a substitute for professional medical care. Always follow your healthcare professional's instructions.

## 2017-02-01 NOTE — PROGRESS NOTES
[facility] NEW PATIENT HISTORY & PHYSICAL :    Referring Physician: Ken     Chief Complaint   Patient presents with    Shoulder Pain     left shoulder pain,to the upper arm    Hand Pain     left thumb       HPI: This is a 76 y.o.  female being seen in clinic today for evaluation of neck and shoulder tightness that has increased over the past few weeks.  She describes a tight, achy pain in her shoulder with limited neck ROM at times.  She denies weakness or numbness/tingling, but has achy pain radiating up to her neck/heat and into her upper arm.  Topical agents have provided some relief.     History obtained from patient    Functional History:  Walking: Not limited  Transfers: Independent  Assistive devices: No  Power mobility: No  Falls: None   Directional preference:    Employment status:    Needs help with:  Nothing - all ADLS normal    Review of Systems:     General- denies lethargy, weight change, fever, chills  Head/neck- denies swallowing difficulties  ENT- denies hearing changes  Cardiovascular-denies chest pain  Pulmonary- denies shortness of breath  GI- denies constipation or bowel incontinence  - denies bladder incontinence  Skin- denies wounds or rashes  Musculoskeletal- denies weakness, +pain  Neurologic- denies numbness and tingling  Psychiatric- denies depressive or psychotic features, denies anxiety  Lymphatic-denies swelling  Endocrine- denies hypoglycemic symptoms/DM history    Physical Examination:  General: Well developed, well nourished female, NAD    Spinal Examination: CERVICAL  Active ROM is within normal limits.  Inspection: No deformity of spinal alignment.  Palpation: No vertebral tenderness to percussion.  Very tight and tender band at left trapezius, rhomboid, levator scap, splenius capitus  Spurling test: neg    Spinal Examination: LUMBAR or THORACIC  Active ROM is within normal limits.  Inspection: No deformity of spinal alignment.      Musculoskeletal Tests:  Phalen:    Elbow  compression (ulnar):   Tinel at wrist: neg  Scratch-Collapse Test:     Bilateral Upper and Lower Extremities:  Pulses are 2+ at radial,  bilaterally.  Shoulder/Elbow/Wrist/Hand ROM wnl except rot cuff weakness on left  Hip/Knee/Ankle ROM wnl  Bilateral Extremities show normal capillary refill.  No signs of cyanosis, rubor, edema, skin changes, or dysvascular changes of appendages.  Nails appear intact.    Neurological Exam:  Cranial Nerves:  II-XII grossly intact    Manual Muscle Testing: (Motor 5=normal)    RIGHT Upper extremity: Shoulder abduction 5/5, Biceps 5/5, Triceps 5/5, Wrist extension 5/5, Abductor pollicis brevis 5/5, Ulnar hand intrinsics 5/5,  LEFT Upper extremity: Shoulder abduction 5/5, Biceps 5/5, Triceps 5/5, Wrist extension 5/5, Abductor pollicis brevis 5/5, Ulnar hand intrinsics 5/5,  No focal atrophy is noted of either upper or lower extremity.    Bilateral Reflexes:1+bic tric br  Rodriguez's response is absent bilaterally.    Sensation: tested to light touch  - intact in arms  Gait: Narrow base and good arm swing.    Cerebellar: Normal FNF and tandem gait.     IMPRESSION/PLAN: This is a 76 y.o.  female with myofascial pain, rotator cuff weakness-left    1. rx for PT-Myofascial release, stretch, posture, cuff strengthening, ROM, modalities  2. zanaflex 2mg qhs prn  3. Cont topical agents-tiger balm, etc. Ice/heat modalities  4. Handouts on myofascial pain provided  5. Fu 6 wks    Lorraine Rizo M.D.  Physical Medicine and Rehab

## 2017-02-01 NOTE — MR AVS SNAPSHOT
[facility]  [address] [address]  Phone: [phone]  Fax: [phone]                  Violet Swenson   2017 11:20 AM   Office Visit    Description:  Female : 1940   Provider:  Lorraine Rizo MD   Department[facility]  [facility]           Reason for Visit     Shoulder Pain     Hand Pain           Diagnoses this Visit        Comments    Myofascial pain    -  Primary     Arm weakness                To Do List           Future Appointments        Provider Department Dept Phone    2017 11:20 AM Lorraine Rizo MD [facility] [phone]    3/14/2017 8:40 AM Lorraine Rizo MD [facility] [phone]    2017 8:20 AM Marti Coronel MD [place] [facility] [phone]    2017 10:00 AM Dilshad Rogers MD [facility] - Rheumatology [phone]      Goals (5 Years of Data)     None       These Medications        Disp Refills Start End    tizanidine (ZANAFLEX) 2 MG tablet 30 tablet 1 2017     Take 1 tablet (2 mg total) by mouth nightly as needed (muscle spasms). - Oral    Pharmacy: [facility] [address] [place] [address] AT [facility] Ph #: [phone]         [facility] On [facility]     [facility] -  Assistance  Registered nurses in the [facility] provide clinical advisement, health education, appointment booking, and other advisory services.  Call for this free service at [phone].             Medications           Message regarding Medications     Verify the changes and/or additions to your medication regime listed below are the same as discussed with your clinician today.  If any of these changes or additions are incorrect, please notify your healthcare provider.        START taking these NEW medications        Refills    tizanidine (ZANAFLEX) 2 MG tablet 1    Sig: Take 1 tablet (2 mg total) by mouth nightly as needed (muscle spasms).     Class: Normal    Route: Oral      STOP taking these medications     amoxicillin (AMOXIL) 500 MG capsule TK ONE C PO TID           Verify that the below list of medications is an accurate representation of the medications you are currently taking.  If none reported, the list may be blank. If incorrect, please contact your healthcare provider. Carry this list with you in case of emergency.           Current Medications     allopurinol (ZYLOPRIM) 100 MG tablet TAKE 2 TABLET BY MOUTH DAILY    aspirin (ECOTRIN) 81 MG EC tablet Take 81 mg by mouth once daily.     baicalin-catechin 500 mg Cap Take 500 mg by mouth 2 (two) times daily.    clopidogrel (PLAVIX) 75 mg tablet TAKE 1 TABLET BY MOUTH EVERY DAY    colchicine 0.6 mg tablet TAKE 1 TABLET(0.6 MG) BY MOUTH EVERY DAY    ergocalciferol, vitamin D2, 400 unit Tab Take by mouth daily.    fluocinolone acetonide oil 0.01 % Drop Place 5 drops in ear(s) 2 (two) times daily. 5 drops in affected ear bid x 7-14 days    fluticasone (FLONASE) 50 mcg/actuation nasal spray 2 sprays by Each Nare route once daily.    gabapentin (NEURONTIN) 100 MG capsule TAKE 1 CAPSULE(100 MG) BY MOUTH THREE TIMES DAILY    hydrochlorothiazide (HYDRODIURIL) 12.5 MG Tab TAKE 1 TABLET BY MOUTH ONCE DAILY    isosorbide mononitrate (IMDUR) 30 MG 24 hr tablet TAKE 1 TABLET BY MOUTH EVERY DAY    levothyroxine (SYNTHROID) 75 MCG tablet TAKE 1 TABLET(75 MCG) BY MOUTH BEFORE BREAKFAST    meclizine (ANTIVERT) 25 mg tablet Take 1 tablet (25 mg total) by mouth 3 (three) times daily as needed for Dizziness.    meloxicam (MOBIC) 7.5 MG tablet TAKE 1 TABLET BY MOUTH ONCE DAILY AS NEEDED FOR PAIN    nitroglycerin 400 mcg/spray SprA Place onto the tongue.    ranitidine (ZANTAC) 150 MG tablet Take 150 mg by mouth nightly.    rosuvastatin (CRESTOR) 10 MG tablet Take 1 tablet (10 mg total) by mouth once daily.    sertraline (ZOLOFT) 100 MG tablet Take 1 tablet (100 mg total) by mouth once daily.    tizanidine (ZANAFLEX) 2  "MG tablet Take 1 tablet (2 mg total) by mouth nightly as needed (muscle spasms).    turmeric root extract 500 mg Cap Take 500 mg by mouth 2 (two) times daily.    verapamil (CALAN-SR) 120 MG CR tablet Take 1 tablet (120 mg total) by mouth nightly. One-half tablet by mouth nightly    ZINC ACETATE ORAL 250 mg.           Clinical Reference Information           Vital Signs - Last Recorded  Most recent update: 2/1/2017 10:51 AM by Ni Cruz LPN    BP Pulse Resp Ht Wt BMI    120/74 92 14 5' 4" (1.626 m) 84.8 kg (187 lb) 32.1 kg/m2      Blood Pressure          Most Recent Value    BP  120/74      Allergies as of 2/1/2017     Corticosteroids (Glucocorticoids)    Oxycodone      Immunizations Administered on Date of Encounter - 2/1/2017     None      Orders Placed During Today's Visit      Normal Orders This Visit    Ambulatory Referral to Physical/Occupational Therapy       [facility] Sign-Up     Activating your [facility] account is as easy as 1-2-3!     1) Visit [email or web address], select Sign Up Now, enter this activation code and your date of birth, then select Next.  Activation code not generated  Current Patient Portal Status: Account disabled      2) Create a username and password to use when you visit [facility] in the future and select a security question in case you lose your password and select Next.    3) Enter your e-mail address and click Sign Up!    Additional Information  If you have questions, please e-mail [email or web address] or call [phone] to talk to our [facility] staff. Remember, [facility] is NOT to be used for urgent needs. For medical emergencies, dial 911.         Instructions      Myofascial Pain Syndrome: Fibrositis  Your pain is caused by a state of chronic muscle tension. This condition is called by various names: myofascial pain, fibrositis and trigger point pain. This can also be due to mechanical stress (such as working at a computer terminal for long periods; or work that requires " repetitive motions of the arms or hands) or emotional stress (such as problems on the job or in your personal life). Sometimes there is no obvious cause. The pain can occur in the area of the muscle spasm or at a site distant to it. For example, spasm of a neck muscle can cause headache. Spasm of the muscle near the shoulder blade can cause pain shooting down the arm.  Home Care:  · Try to identify the factors that may be causing your problem and change them:  ¨ If you feel that emotional stress is a cause of your pain, learn methods to deal more effectively with the stress in your life. These may include regular exercise, muscle relaxation techniques, meditation or simply taking time out for yourself. Consult your doctor or go to a local bookstore and review the many books and tapes available on the subject of stress reduction.  ¨ If you feel that physical stress is a cause for your pain, try to modify any poor work habits.  · You may use acetaminophen (Tylenol) or ibuprofen (Motrin, Advil) to control pain, unless another medicine was prescribed. [NOTE: If you have chronic liver or kidney disease or ever had a stomach ulcer or GI bleeding, talk with your doctor before using these medicines.]  · The use of heat to the muscle (hot compress or heating pad) will be helpful to reduce muscle spasm. Some persons get relief with ice packs. Apply an ice pack (crushed or cubed ice in a plastic bag, wrapped in a towel) for 20 minutes at a time as needed. Use the method that feels best to you.  · Massaging the trigger point and stretching out the muscle are an important parts of prevention and treatment. Trigger point massage can be done by first applying heat to the area to warm and prepare the muscle. Have someone apply steady thumb pressure directly on the knot in the muscle (the most tender point) for 30 seconds. Release the pressure, then massage the surrounding muscle. Repeat the process, applying more pressure to the  trigger point each time. Do this up to the limit of pain. With each treatment, the trigger point should become less tender and the pain should decrease. You can apply local pressure to trigger points in the back by lying on the floor with a tennis ball under the trigger point.  Follow Up  with your doctor as advised or if not improving within the next week. It may be necessary for you to receive physical therapy if you do not respond to home treatment alone.  Get Prompt Medical Attention  if any of the following occur:  · If your trigger point is in the chest muscles, observe for pain that becomes more severe, lasts longer, or spreads into your shoulder/arm, neck or back; you develop trouble breathing, sweating, nausea or vomiting in association with chest pain  · If you develop weakness or numbness in an extremity  · If your pain worsens, regardless of its location  © 0506-2273 [employer]. [address] [place] [address]. All rights reserved. This information is not intended as a substitute for professional medical care. Always follow your healthcare professional's instructions.          Trigger Point Injection  The cause of your muscle pain or spasms may be one or more trigger points. Your health care provider may decide to inject the painful spots to relax the muscle. This can help relieve your pain. Relaxing the muscle can also make movement easier. You may then be able to exercise to strengthen the muscle and help it heal.    What is a trigger point?  A trigger point is a tight, painful knot of muscle fiber. It can form where a muscle is strained or injured. The knot can sometimes be felt under the skin. A trigger point is very tender to the touch. Pain may also spread to other parts of the affected muscle. Muscles around a knee, shoulder blade, or other bones are prone to trigger points. This is because these muscles are more likely to be injured.    About the injections  Any muscle  in the body can have one or more trigger points. Several injections may be needed in each trigger point to best relieve pain. These injections may be given in sessions about 2 weeks apart, depending on the preference of your health care provider. In some cases, you may not feel much change in your symptoms until after the third injection.     © 8322-6613 [employer]. [address] [place] [address]. All rights reserved. This information is not intended as a substitute for professional medical care. Always follow your healthcare professional's instructions.            Your Neck Muscles  The muscles in the neck and shoulders need to be strong to hold the neck and head in place. These muscles also help move the neck and shoulders. Your health care provider can recommend exercises to help stretch and strengthen your neck muscles.    © 7557-5760 [employer]. [address] [place] [address]. All rights reserved. This information is not intended as a substitute for professional medical care. Always follow your healthcare professional's instructions.          Neck Problems: Relieving Your Symptoms  The first goal of treatment is to relieve your symptoms. Your health care provider may recommend self-care treatments. These include resting, applying ice and heat, taking medication, and doing exercises. Your health care provider may also recommend that you see a physical therapist, who can teach you ways to care for and strengthen your neck.    Self-Care Treatments  Pain can end quickly or last awhile. Either way, youll want relief as soon as possible. Your health care provider can tell you which treatments to do at home to help relieve your pain.  · Lying down for a short time takes pressure from the head off the neck.  · Ice and heat can help reduce pain. To bring down swelling, rest an ice pack wrapped in a thin towel on your neck for 15 minutes. To relax sore muscles, apply a  warm, wet towel to the area. Or take a warm bath or shower.  · Over-the-counter medications, such as ibuprofen, naproxen, and aspirin, can help reduce pain and swelling. Acetaminophen can help relieve pain. Use these only as directed.  · Exercises can relax muscles and prevent stiffness. To prepare, drape a warm, wet towel around your neck and shoulders for 5 minutes. Remove the towel. Then do any exercises recommended to you by your health care provider.  Physical Therapy  If self-care treatments arent helping relieve neck pain, your health care provider may suggest one or more sessions of physical therapy. Physical therapy is performed by a specialist trained to treat injuries. Your physical therapist (PT) will teach you how to strengthen muscles, improve the spines alignment, and help you move properly. Treatment methods used in physical therapy may include:  · Heat. A special heating pad called a neck pack may be applied to your neck.  · Exercises. Your PT will teach you exercises to help strengthen your neck and improve its range of motion.  · Joint mobilization. The PT gently moves your vertebrae to help restore motion in your neck joints and reduce neck pain.  · Soft tissue mobilization. The PT massages and stretches the muscles in your neck and shoulders.  · Electrical stimulation. Electrical impulses are sent into your neck. This helps reduce soreness and inflammation.  · Education in body mechanics. The PT shows you ways to position and move your body that protect the neck.  Other Treatments  If physical therapy doesnt relieve your neck pain, your health care provider may suggest other treatments. For example, medications or injections can help relieve pain and swelling. In some cases, surgery may be needed to treat neck problems.  © 9153-5319 [employer]. [address] [place] [address], [place] [address]. All rights reserved. This information is not intended as a substitute for professional  medical care. Always follow your healthcare professional's instructions.          Understanding Neck Problems       If you suffer from neck pain, youre not alone. Many people have neck pain at some point in their lives. Problems such as poor posture, injury, and wear and tear can lead to neck pain. Your health care provider will work with you to find the treatment thats best for your neck.  Types of Neck Problems  The following problems can cause pain or injury in your neck:  · Strains and sprains: Strains (stretched or torn muscles) and sprains (stretched or torn ligaments) can cause neck pain. Strains and sprains can occur during an accident, or when you overuse your neck through repetitive motion. They can also cause your muscles and ligaments to become inflamed (swollen and painful).  · Whiplash and other injuries: Whiplash can result when an impact throws your head, forcing your neck too far forward (hyperflexion), then too far backward (hyperextension). When combined, the two motions can cause a painful injury to different parts of your neck, such as muscles, ligaments, or joints. The most common cause of whiplash is a car accident. But it can also happen during a fall or sports injury.  · Weakened disks: A simple action, such as a sneeze or a cough, can cause one of your disks to bulge (herniate). A herniated disk can put pressure on your nerve and cause pain. Over time, disks can also thin out (degenerate). Flattened disks dont cushion vertebrae well and can cause vertebrae to rub together. Rubbing vertebrae can pinch nerves and cause pain.  · Weakened joints: Aging and injury can cause joints to slowly degenerate. Thinned joints can also cause vertebrae to rub together. This can cause abnormal growths of bone (bone spurs) to form on vertebrae. Bone spurs put pressure on nerves, causing pain.  Common Symptoms  If you have a neck problem, you may have one or more of the following symptoms:  · Muscle  tension and spasm: You may not be able to move your neck, arms, or shoulders comfortably if you have muscle tension or stiffness in your neck. If your symptoms arent relieved, you may experience muscle spasms, or knots of contracted tissue (trigger points) in areas of your neck and shoulders.  · Aches and pains: Dull aches in your head or neck, sharp pains, and swelling of the soft tissue of your neck and shoulders are common symptoms. If theres pressure on the nerves in your neck, you may feel pain in your arms or hands (referred pain).  · Numbness or weakness: If you injure the nerves in your neck, you may experience numbness, tingling, or weakness in your shoulders, arms, or hands. These symptoms arise when disks or bone spurs press on the nerves in your neck.  © 1930-0946 [employer]. [address] [place] [address]. All rights reserved. This information is not intended as a substitute for professional medical care. Always follow your healthcare professional's instructions.          Neck Spasm [No Trauma]    Spasm of the neck muscles can occur after a sudden awkward neck movement. Sleeping with your neck in a crooked position can also cause spasm. Some persons respond to emotional stress by tensing the muscles of their neck, shoulders and upper back. If neck spasm lasts long enough, it can cause headache.  The treatment described below will usually help the pain to go away in 5-7 days. Pain that continues may require further evaluation or other types of treatment such as physical therapy.  Home Care:  1. Rest and relax the muscles. Use a comfortable pillow that supports the head and keeps the spine in a neutral position. The position of the head should not be tilted forward or backward. A rolled up towel may help for a custom fit.  2. Some persons find relief with heat (hot shower, hot bath or heating pad) and massage, while others prefer cold packs (crushed or cubed ice in a plastic  bag, wrapped in a towel). Try both and use the method that feels best for 20 minutes several times a day.  3. You may use acetaminophen (Tylenol) or ibuprofen (Motrin, Advil) to control pain, unless another medicine was prescribed. [NOTE: If you have chronic liver or kidney disease or ever had a stomach ulcer or GI bleeding, talk with your doctor before using these medicines.]  Follow Up  with your doctor or this facility if your symptoms do not show signs of improvement after one week. Physical therapy or further evaluation may be needed.  [NOTE: If x-rays were taken, they will be reviewed by a radiologist. You will be notified of any new findings that may affect your care.]  Return Promptly  or contact your doctor if any of the following occurs:  · Pain becomes worse or spreads into one or both arms  · Weakness or numbness in one or both arms  · Increasing headache with nausea or vomiting  · Fever over 100.4ºF (38.0ºC)  © 8858-8583 [employer]. [address] [place] [address]. All rights reserved. This information is not intended as a substitute for professional medical care. Always follow your healthcare professional's instructions.          Know Your Neck: The Cervical Spine  By learning about the parts of the neck, you can better understand your neck problem. The bones of the neck are called cervical vertebrae, commonly identified as C1 through C7. Together, they form a bony column called the spine. Vertebrae also protect the spinal cord, a pathway for messages to reach the brain. Surrounding the spine are soft tissues such as muscles, tendons, and nerves.        Flexibility Is Key  For the neck to function normally, it has to be flexible enough to move without discomfort. A healthy neck can move easily in six different directions.    © 2132-3791 [employer]. [address] [place] [address]. All rights reserved. This information is not intended as a substitute for  professional medical care. Always follow your healthcare professional's instructions.          Protecting Your Neck: Posture and Body Mechanics  Protecting your neck from injuries and pain involves practicing good posture and body mechanics. This may mean correcting bad habits you have related to the way you hold and move your body. The tips below can help you improve your posture and body mechanics.    What Is Posture and Why Does It Matter?  Posture is the way you hold your body. For many of us, this means hunching over, thrusting the chin forward, and slouching the shoulders. But this kind of poor posture keeps muscles from properly supporting the neck and puts stress on muscles, disks, ligaments, and joints in your neck. As a result, injury and pain can occur.  How Is Your Posture?  Use a full-length mirror to check your posture. To begin, stand normally. Then slowly back up against a wall. Is there space between your head and the wall? Do you slouch your shoulders? Is your chin pointing up or down? All these can cause neck pain and injury.  Improving Your Posture  Follow these steps to improve your posture:  · Pull your shoulders back.  · Think of the ears, shoulders, and hips as a series of dots. Now, adjust your body to connect the dots in a straight line.  · Keep your chin level.  What Are Body Mechanics and Why Do They Matter?  The way you move and position your body during daily activities is called body mechanics. Good body mechanics help protect the neck. This means learning the right ways to stand, sit, and even sleep. So do whats best for your neck and practice good body mechanics.  Standing   To protect your neck while standing:  · Carry objects close to your body.  · Keep your ears and shoulders in a line while standing or walking.  · To lower yourself, bend at the knees with a straight back. Do this instead of looking down and reaching for objects.  · Work at eye level. Dont reach above your head  or tilt your head back.  Sitting   To protect your neck while sitting:  · Set up your workstation so your monitor is at eye level. Also, use a document chung when viewing papers or books.  · Keep your knees at or slightly below the level of your hips.  · Sit up straight, with feet flat on the floor. If your feet dont touch the floor, use a footrest.  · Avoid sitting or driving for long periods. Take frequent breaks.  Sleeping   To protect your neck while sleeping:  · Sleep on your back with a pillow under your knees, or on your side with a pillow between bent knees. This helps align the spine.  · Avoid using pillows that are too high or too low. Instead, use a neck roll or pillow under your neck while you sleep to keep the neck straight.  · Sleep on a mattress that supports you, with a pillow under your neck.  © 6766-6788 [employer]. [address] [place], [place] [address]. All rights reserved. This information is not intended as a substitute for professional medical care. Always follow your healthcare professional's instructions.          Exercises at Your Workstation: Eyes, Neck, and Head     Tired eyes? Stiff neck? A few easy moves can help prevent these kinds of problems. Take a few minutes during your day to do these exercises--right at your desk. They'll loosen up your muscles, keep you more alert, and make a big difference in how you work and feel.    For your eyes  Eye cup  · Lean forward with your elbows on your desk.  · Cup your hands and place them lightly over your closed eyes. Hold for a minute, while breathing deeply in and out.  · Slowly uncover and open your eyes. Repeat 2 times.  Eye roll  · Close your eyes. Slowly roll your eyeballs clockwise all the way around. Repeat 3 times.  · Now slowly roll them all the way around counterclockwise. Repeat 3 times.  Eye rest  · Every 20 minutes, look away from the computer screen. Focus on an object at least 20 feet away. Stay focused on  this object for a full 20 seconds.    For your neck and head  Warm-up  · Drop your head gently to your chest. While breathing in, slowly roll your head up to your left shoulder. While breathing out, slowly roll your head back to center. Repeat to the right.  · Repeat 3 times on each side.  Head tilt  · Sit up straight. Tuck in your chin.  · Slowly tip your head to the left. Return to the center. Then, tip your head to the right.  · Repeat 3 times on each side.    Head turn  · Sit up straight.  · Slowly turn your head and look over your left shoulder. Hold for a few seconds. Go back to the center, then repeat to your right.  · Repeat 3 times on each side.  © 2442-1209 [employer]. [address] [place] [address]. All rights reserved. This information is not intended as a substitute for professional medical care. Always follow your healthcare professional's instructions.          Reach and Hold Exercise    Do this exercise on your hands and knees. Keep your knees under your hips and your hands under your shoulders. Keep your spine in a neutral position (not arched or sagging). Keep your ears in line with your shoulders. Hold for a few seconds before starting the exercise:  4. Tighten your abdominal muscles and raise one arm straight in front of you, palm down. Hold for 5 seconds, then lower. Repeat 5 times.  5. Do the exercise again, this time lifting your arm to the side. Repeat 5 times.  6. Do the exercise again, this time lifting your arm backward, palm up. Repeat 5 times.  Switch sides and do each exercise with the other arm.  © 3514-2317 [employer]. [address] [place] [address]. All rights reserved. This information is not intended as a substitute for professional medical care. Always follow your healthcare professional's instructions.        Shoulder and Upper Back Stretch  To start, stand tall with your ears, shoulders, and hips in line. Your feet should be  slightly apart, positioned just under your hips. Focus your eyes directly in front of you.  this position for a few seconds before starting your exercise. This helps increase your awareness of proper posture.  Reach overhead and slightly back with both arms. Keep your shoulders and neck aligned and your elbows behind your shoulders:  · With your palms facing the ceiling, turn your fingers inward.  · Take a deep breath. Breathe out, and lower your elbows toward your buttocks. Hold for 5 seconds, then return to starting position.  · Repeat 3 times.    © 9588-1929 "[employer] [place]". [address] [place] [address]. All rights reserved. This information is not intended as a substitute for professional medical care. Always follow your healthcare professional's instructions.          Shoulder Clock Exercise  To start, stand tall with your ears, shoulders, and hips in line. Your feet should be slightly apart, positioned just under your hips. Focus your eyes directly in front of you.  this position for a few seconds before starting your exercise. This helps increase your awareness of proper posture.  · Imagine that your right shoulder is the center of a clock. With the outer point of your shoulder, roll it around to slowly trace the outer edge of the clock.  · Move clockwise first, then counterclockwise.  · Repeat 3 times. Switch shoulders.   © 5516-8012 "[employer] [place]". [address] [place] [address]. All rights reserved. This information is not intended as a substitute for professional medical care. Always follow your healthcare professional's instructions.          Shoulder Girdle Stretch     To start, sit in a chair with your feet flat on the floor. Your weight should be slightly forward so that youre balanced evenly on your buttocks. Relax your shoulders and keep your head level. Using a chair with arms may help you keep your balance:  · Place 1 hand on the  outside elbow of the other arm.  · Pull the arm across your body. Hold for 30 to 60 seconds. Repeat once.  · Switch sides.    © 4178-1449 [employer]. [address] [place] [address]. All rights reserved. This information is not intended as a substitute for professional medical care. Always follow your healthcare professional's instructions.          Shoulder Exercises      To start, sit in a chair with your feet flat on the floor. Your weight should be slightly forward so that youre balanced evenly on your buttocks. Relax your shoulders and keep your head level. Avoid arching your back or rounding your shoulders. Using a chair with arms may help you keep your balance.  · Raise your arms, elbows bent, to shoulder height.  · Slowly move your forearms together. Hold for 5 seconds.  · Return to starting position. Repeat 5 times.  © 5647-3731 [employer]. [address] [place] [address]. All rights reserved. This information is not intended as a substitute for professional medical care. Always follow your healthcare professional's instructions.        Shoulder Shrug Exercise  To start, sit in a chair with your feet flat on the floor. Shift your weight slightly forward to avoid rounding your back. Relax. Keep your ears, shoulders, and hips aligned:  · Raise both of your shoulders as high as you can, as if you were trying to touch them to your ears. Keep your head and neck still and relaxed.  · Hold for a count of 10. Release.  · Repeat 5 times.    © 4637-4741 [employer]. [address] [place] [address]. All rights reserved. This information is not intended as a substitute for professional medical care. Always follow your healthcare professional's instructions.          Shoulder Squeeze Exercise     To start, sit in a chair with your feet flat on the floor. Shift your weight slightly forward to avoid rounding your back. Relax. Keep your ears,  shoulders, and hips aligned:  · Raise your arms to shoulder height, elbows bent and palms forward.  · Move your arms back, squeezing your shoulder blades together.  · Hold for 10 seconds. Return to starting position.   · Repeat 5 times.     © 6561-6246 [employer]. [address] [place] [address], [place] [address]. All rights reserved. This information is not intended as a substitute for professional medical care. Always follow your healthcare professional's instructions.

## 2017-02-01 NOTE — MR AVS SNAPSHOT
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       These Medications        Disp Refills Start End    baicalin-catechin 500 mg Cap 60 each 5 2017     Take 500 mg by mouth 2 (two) times daily. - Oral    Pharmacy: [facility] [address] [place] [address] Ph #: [phone]         [facility] On Call     [facility] On Call Nurse Care Line -  Assistance  Registered nurses in the [facility] Center provide clinical advisement, health education, appointment booking, and other advisory services.  Call for this free service at [phone].             Medications           Message regarding Medications     Verify the changes and/or additions to your medication regime listed below are the same as discussed with your clinician today.  If any of these changes or additions are incorrect, please notify your healthcare provider.        START taking these NEW medications        Refills    baicalin-catechin 500 mg Cap 5    Sig: Take 500 mg by mouth 2 (two) times daily.    Class: Normal    Route: Oral           Verify that the below  list of medications is an accurate representation of the medications you are currently taking.  If none reported, the list may be blank. If incorrect, please contact your healthcare provider. Carry this list with you in case of emergency.           Current Medications     allopurinol (ZYLOPRIM) 100 MG tablet TAKE 2 TABLET BY MOUTH DAILY    amoxicillin (AMOXIL) 500 MG capsule TK ONE C PO TID    aspirin (ECOTRIN) 81 MG EC tablet Take 81 mg by mouth once daily.     baicalin-catechin 500 mg Cap Take 500 mg by mouth 2 (two) times daily.    clopidogrel (PLAVIX) 75 mg tablet TAKE 1 TABLET BY MOUTH EVERY DAY    colchicine 0.6 mg tablet TAKE 1 TABLET(0.6 MG) BY MOUTH EVERY DAY    ergocalciferol, vitamin D2, 400 unit Tab Take by mouth daily.    fluocinolone acetonide oil 0.01 % Drop Place 5 drops in ear(s) 2 (two) times daily. 5 drops in affected ear bid x 7-14 days    fluticasone (FLONASE) 50 mcg/actuation nasal spray 2 sprays by Each Nare route once daily.    gabapentin (NEURONTIN) 100 MG capsule TAKE 1 CAPSULE(100 MG) BY MOUTH THREE TIMES DAILY    hydrochlorothiazide (HYDRODIURIL) 12.5 MG Tab TAKE 1 TABLET BY MOUTH ONCE DAILY    isosorbide mononitrate (IMDUR) 30 MG 24 hr tablet TAKE 1 TABLET BY MOUTH EVERY DAY    levothyroxine (SYNTHROID) 75 MCG tablet TAKE 1 TABLET(75 MCG) BY MOUTH BEFORE BREAKFAST    meclizine (ANTIVERT) 25 mg tablet Take 1 tablet (25 mg total) by mouth 3 (three) times daily as needed for Dizziness.    meloxicam (MOBIC) 7.5 MG tablet TAKE 1 TABLET BY MOUTH ONCE DAILY AS NEEDED FOR PAIN    nitroglycerin 400 mcg/spray SprA Place onto the tongue.    ranitidine (ZANTAC) 150 MG tablet Take 150 mg by mouth nightly.    rosuvastatin (CRESTOR) 10 MG tablet Take 1 tablet (10 mg total) by mouth once daily.    sertraline (ZOLOFT) 100 MG tablet Take 1 tablet (100 mg total) by mouth once daily.    turmeric root extract 500 mg Cap Take 500 mg by mouth 2 (two) times daily.    verapamil (CALAN-SR) 120 MG CR tablet Take 1  tablet (120 mg total) by mouth nightly. One-half tablet by mouth nightly    ZINC ACETATE ORAL 250 mg.           Clinical Reference Information           Vital Signs - Last Recorded  Most recent update: 2/1/2017  8:49 AM by Kael Pinedo LPN    BP Pulse Wt BMI       138/85 90 85 kg (187 lb 6.3 oz) 32.17 kg/m2       Blood Pressure          Most Recent Value    BP  138/85      Allergies as of 2/1/2017     Corticosteroids (Glucocorticoids)    Oxycodone      Immunizations Administered on Date of Encounter - 2/1/2017     None      [facility] Sign-Up     Activating your [facility] account is as easy as 1-2-3!     1) Visit [email or web address], select Sign Up Now, enter this activation code and your date of birth, then select Next.  Activation code not generated  Current Patient Portal Status: Account disabled      2) Create a username and password to use when you visit [facility] in the future and select a security question in case you lose your password and select Next.    3) Enter your e-mail address and click Sign Up!    Additional Information  If you have questions, please e-mail [email or web address] or call [phone] to talk to our [facility] staff. Remember, [facility] is NOT to be used for urgent needs. For medical emergencies, dial 911.

## 2017-02-01 NOTE — ASSESSMENT & PLAN NOTE
On long term NSAID's. Not responding to low dose mobic. Try limbrel to reduce chances of Renal/GI/CVD side effects.

## 2017-02-01 NOTE — ASSESSMENT & PLAN NOTE
Stable. No flare ups. Uric acid at goal on current dose of allopurinol. Continue colchicine for now to prevent attacks. Will consider to discontinue it in future visit.

## 2017-03-01 DIAGNOSIS — M79.2 NEUROPATHIC PAIN OF FOOT, RIGHT: ICD-10-CM

## 2017-03-01 DIAGNOSIS — M1A.09X0 IDIOPATHIC CHRONIC GOUT OF MULTIPLE SITES WITHOUT TOPHUS: ICD-10-CM

## 2017-03-01 RX ORDER — COLCHICINE 0.6 MG/1
TABLET ORAL
Qty: 30 TABLET | Refills: 0 | Status: SHIPPED | OUTPATIENT
Start: 2017-03-01 | End: 2017-04-04 | Stop reason: SDUPTHER

## 2017-03-01 RX ORDER — GABAPENTIN 100 MG/1
CAPSULE ORAL
Qty: 90 CAPSULE | Refills: 0 | Status: SHIPPED | OUTPATIENT
Start: 2017-03-01 | End: 2017-03-28 | Stop reason: SDUPTHER

## 2017-03-14 ENCOUNTER — OFFICE VISIT (OUTPATIENT)
Dept: PHYSICAL MEDICINE AND REHAB | Facility: CLINIC | Age: 77
End: 2017-03-14
Payer: MEDICARE

## 2017-03-14 VITALS
SYSTOLIC BLOOD PRESSURE: 143 MMHG | WEIGHT: 179 LBS | HEIGHT: 64 IN | DIASTOLIC BLOOD PRESSURE: 90 MMHG | HEART RATE: 83 BPM | RESPIRATION RATE: 14 BRPM | BODY MASS INDEX: 30.56 KG/M2

## 2017-03-14 DIAGNOSIS — M79.18 MYOFASCIAL PAIN: Primary | ICD-10-CM

## 2017-03-14 PROBLEM — R29.898 ARM WEAKNESS: Status: RESOLVED | Noted: 2017-02-01 | Resolved: 2017-03-14

## 2017-03-14 PROCEDURE — 1125F AMNT PAIN NOTED PAIN PRSNT: CPT | Mod: S$GLB,,, | Performed by: PHYSICAL MEDICINE & REHABILITATION

## 2017-03-14 PROCEDURE — 1159F MED LIST DOCD IN RCRD: CPT | Mod: S$GLB,,, | Performed by: PHYSICAL MEDICINE & REHABILITATION

## 2017-03-14 PROCEDURE — 3080F DIAST BP >= 90 MM HG: CPT | Mod: S$GLB,,, | Performed by: PHYSICAL MEDICINE & REHABILITATION

## 2017-03-14 PROCEDURE — 99999 PR PBB SHADOW E&M-EST. PATIENT-LVL III: CPT | Mod: PBBFAC,,, | Performed by: PHYSICAL MEDICINE & REHABILITATION

## 2017-03-14 PROCEDURE — 1160F RVW MEDS BY RX/DR IN RCRD: CPT | Mod: S$GLB,,, | Performed by: PHYSICAL MEDICINE & REHABILITATION

## 2017-03-14 PROCEDURE — 3077F SYST BP >= 140 MM HG: CPT | Mod: S$GLB,,, | Performed by: PHYSICAL MEDICINE & REHABILITATION

## 2017-03-14 PROCEDURE — 1157F ADVNC CARE PLAN IN RCRD: CPT | Mod: S$GLB,,, | Performed by: PHYSICAL MEDICINE & REHABILITATION

## 2017-03-14 PROCEDURE — 99213 OFFICE O/P EST LOW 20 MIN: CPT | Mod: S$GLB,,, | Performed by: PHYSICAL MEDICINE & REHABILITATION

## 2017-03-14 NOTE — PROGRESS NOTES
PM&R CLINIC NOTE    Chief Complaint   Patient presents with    Shoulder Pain     left, improving    Elbow Pain     right       HPI: This is a 77 y.o.  female being seen in clinic today for follow up of neck and shoulder tightness .  She reports improved neck and shoulder pain, increased arm strength.  She feels she may have popped a small blood vessel in her arm last Friday in therapy.     History obtained from patient    Review of Systems:     General- denies lethargy, weight change, fever, chills  Head/neck- denies swallowing difficulties  ENT- denies hearing changes  Cardiovascular-denies chest pain  Pulmonary- denies shortness of breath  GI- denies constipation or bowel incontinence  - denies bladder incontinence  Skin- denies wounds or rashes  Musculoskeletal- denies weakness, +/-pain  Neurologic- denies numbness and tingling  Psychiatric- denies depressive or psychotic features, denies anxiety  Lymphatic-denies swelling  Endocrine- denies hypoglycemic symptoms/DM history    Physical Examination:  General: Well developed, well nourished female, NAD    Spinal Examination: CERVICAL  Active ROM is within normal limits.  Inspection: No deformity of spinal alignment.  Palpation: No vertebral tenderness to percussion.  Not tender at trapezius and rhomboids, mild tightness    Spinal Examination: LUMBAR or THORACIC  Active ROM is within normal limits.  Inspection: No deformity of spinal alignment.      Bilateral Upper and Lower Extremities:  Shoulder/Elbow/Wrist/Hand ROM wnl improved cuff weakness, torturous vessel at right antecubital, tender at vessel   Hip/Knee/Ankle ROM wnl  Bilateral Extremities show normal capillary refill.  No signs of cyanosis, rubor, edema, skin changes, or dysvascular changes of appendages.  Nails appear intact.    Neurological Exam:  Cranial Nerves:  II-XII grossly intact    Manual Muscle Testing: (Motor 5=normal)    RIGHT Upper extremity: Shoulder abduction 5/5, Biceps 5/5, Triceps 5/5,  Wrist extension 5/5, Abductor pollicis brevis 5/5, Ulnar hand intrinsics 5/5,  LEFT Upper extremity: Shoulder abduction 5/5, Biceps 5/5, Triceps 5/5, Wrist extension 5/5, Abductor pollicis brevis 5/5, Ulnar hand intrinsics 5/5,  No focal atrophy is noted of either upper or lower extremity.  Gait: Narrow base and good arm swing.      IMPRESSION/PLAN: This is a 77 y.o.  female with myofascial pain, rotator cuff weakness-improved    1. Cont PT with transition to HEP  2. Dc zanaflex 2mg qhs prn if not needed  3. Cont topical agents-tiger balm, etc. Ice/heat modalities  4. Fu prn, pt advised to re-establish with new cardiologist (was Dr Long). If vessel pain worsens or increases, pt advised to seek urgent/emergent care    Lorraine Rizo M.D.  Physical Medicine and Rehab

## 2017-03-14 NOTE — MR AVS SNAPSHOT
[facility]iatry  [address] [facility] [place] [address]  Phone: [phone]  Fax: [phone]                  Violet Swenson   3/14/2017 8:40 AM   Office Visit    Description:  Female : 1940   Provider:  Lorraine Rizo MD   Department:  [facility] - Physiatr           Reason for Visit     Shoulder Pain     Elbow Pain           Diagnoses this Visit        Comments    Myofascial pain    -  Primary            To Do List           Future Appointments        Provider Department Dept Phone    2017 8:20 AM Jackelin Layton NP [facility] - Internal Medicine [phone]    2017 10:00 AM Dilshad Rogers MD [facility] Rheumatology [phone]      Goals (5 Years of Data)     None      Follow-Up and Disposition     Return if symptoms worsen or fail to improve.      [facility] On Call     [facility] On Call Nurse Care Line -  Assistance  Registered nurses in the [facility] On Call Center provide clinical advisement, health education, appointment booking, and other advisory services.  Call for this free service at [phone].             Medications           Message regarding Medications     Verify the changes and/or additions to your medication regime listed below are the same as discussed with your clinician today.  If any of these changes or additions are incorrect, please notify your healthcare provider.             Verify that the below list of medications is an accurate representation of the medications you are currently taking.  If none reported, the list may be blank. If incorrect, please contact your healthcare provider. Carry this list with you in case of emergency.           Current Medications     allopurinol (ZYLOPRIM) 100 MG tablet TAKE 2 TABLET BY MOUTH DAILY    aspirin (ECOTRIN) 81 MG EC tablet Take 81 mg by mouth once daily.     baicalin-catechin 500 mg Cap Take 500 mg by mouth 2 (two) times daily.    clopidogrel (PLAVIX) 75 mg tablet TAKE 1 TABLET BY MOUTH EVERY DAY  "   colchicine 0.6 mg tablet TAKE 1 TABLET(0.6 MG) BY MOUTH EVERY DAY    ergocalciferol, vitamin D2, 400 unit Tab Take by mouth daily.    fluocinolone acetonide oil 0.01 % Drop Place 5 drops in ear(s) 2 (two) times daily. 5 drops in affected ear bid x 7-14 days    fluticasone (FLONASE) 50 mcg/actuation nasal spray 2 sprays by Each Nare route once daily.    gabapentin (NEURONTIN) 100 MG capsule TAKE 1 CAPSULE(100 MG) BY MOUTH THREE TIMES DAILY    hydrochlorothiazide (HYDRODIURIL) 12.5 MG Tab TAKE 1 TABLET BY MOUTH ONCE DAILY    isosorbide mononitrate (IMDUR) 30 MG 24 hr tablet TAKE 1 TABLET BY MOUTH EVERY DAY    levothyroxine (SYNTHROID) 75 MCG tablet TAKE 1 TABLET(75 MCG) BY MOUTH BEFORE BREAKFAST    meloxicam (MOBIC) 7.5 MG tablet TAKE 1 TABLET BY MOUTH ONCE DAILY AS NEEDED FOR PAIN    nitroglycerin 400 mcg/spray SprA Place onto the tongue.    ranitidine (ZANTAC) 150 MG tablet Take 150 mg by mouth nightly.    rosuvastatin (CRESTOR) 10 MG tablet Take 1 tablet (10 mg total) by mouth once daily.    sertraline (ZOLOFT) 100 MG tablet Take 1 tablet (100 mg total) by mouth once daily.    tizanidine (ZANAFLEX) 2 MG tablet Take 1 tablet (2 mg total) by mouth nightly as needed (muscle spasms).    turmeric root extract 500 mg Cap Take 500 mg by mouth 2 (two) times daily.    verapamil (CALAN-SR) 120 MG CR tablet Take 1 tablet (120 mg total) by mouth nightly. One-half tablet by mouth nightly    ZINC ACETATE ORAL 250 mg.    meclizine (ANTIVERT) 25 mg tablet Take 1 tablet (25 mg total) by mouth 3 (three) times daily as needed for Dizziness.           Clinical Reference Information           Your Vitals Were     BP Pulse Resp Height Weight BMI    143/90 83 14 5' 4" (1.626 m) 81.2 kg (179 lb) 30.73 kg/m2      Blood Pressure          Most Recent Value    BP  (!)  143/90      Allergies as of 3/14/2017     Corticosteroids (Glucocorticoids)    Oxycodone      Immunizations Administered on Date of Encounter - 3/14/2017     None    "   [facility] Sign-Up     Activating your [facility] account is as easy as 1-2-3!     1) Visit [email or web address], select Sign Up Now, enter this activation code and your date of birth, then select Next.  Activation code not generated  Current Patient Portal Status: Account disabled      2) Create a username and password to use when you visit [facility] in the future and select a security question in case you lose your password and select Next.    3) Enter your e-mail address and click Sign Up!    Additional Information  If you have questions, please e-mail [email or web address] or call [phone] to talk to our [employer] staff. Remember, [facility] is NOT to be used for urgent needs. For medical emergencies, dial 911.         Language Assistance Services     ATTENTION: Language assistance services are available, free of charge. Please call [phone].      ATENCIÓN: Si habla eric, tiene a castle disposición servicios gratuitos de asistencia lingüística. Llame al [phone].     CHÚ Ý: N?u b?n nói Ti?ng Vi?t, có các d?ch v? h? tr? ngôn ng? mi?n phí dành cho b?n. G?i s? [phone].         [facility] complies with applicable Federal civil rights laws and does not discriminate on the basis of race, color, national origin, age, disability, or sex.

## 2017-03-16 DIAGNOSIS — I25.10 CORONARY ARTERY DISEASE WITHOUT ANGINA PECTORIS, UNSPECIFIED VESSEL OR LESION TYPE, UNSPECIFIED WHETHER NATIVE OR TRANSPLANTED HEART: Primary | ICD-10-CM

## 2017-03-16 RX ORDER — CLOPIDOGREL BISULFATE 75 MG/1
TABLET ORAL
Qty: 90 TABLET | Refills: 3 | Status: SHIPPED | OUTPATIENT
Start: 2017-03-16 | End: 2017-03-16 | Stop reason: SDUPTHER

## 2017-03-16 RX ORDER — CLOPIDOGREL BISULFATE 75 MG/1
75 TABLET ORAL DAILY
Qty: 90 TABLET | Refills: 3 | Status: SHIPPED | OUTPATIENT
Start: 2017-03-16 | End: 2018-01-10 | Stop reason: SDUPTHER

## 2017-03-28 DIAGNOSIS — M79.2 NEUROPATHIC PAIN OF FOOT, RIGHT: ICD-10-CM

## 2017-03-28 RX ORDER — GABAPENTIN 100 MG/1
CAPSULE ORAL
Qty: 90 CAPSULE | Refills: 2 | Status: SHIPPED | OUTPATIENT
Start: 2017-03-28 | End: 2017-03-30

## 2017-03-30 ENCOUNTER — CLINICAL SUPPORT (OUTPATIENT)
Dept: CARDIOLOGY | Facility: CLINIC | Age: 77
End: 2017-03-30
Payer: MEDICARE

## 2017-03-30 ENCOUNTER — OFFICE VISIT (OUTPATIENT)
Dept: CARDIOLOGY | Facility: CLINIC | Age: 77
End: 2017-03-30
Payer: MEDICARE

## 2017-03-30 VITALS
WEIGHT: 188.69 LBS | DIASTOLIC BLOOD PRESSURE: 90 MMHG | BODY MASS INDEX: 32.21 KG/M2 | HEIGHT: 64 IN | SYSTOLIC BLOOD PRESSURE: 154 MMHG | HEART RATE: 74 BPM

## 2017-03-30 DIAGNOSIS — I10 ESSENTIAL HYPERTENSION: Chronic | ICD-10-CM

## 2017-03-30 DIAGNOSIS — I25.10 CORONARY ARTERY DISEASE INVOLVING NATIVE CORONARY ARTERY OF NATIVE HEART WITHOUT ANGINA PECTORIS: Primary | ICD-10-CM

## 2017-03-30 DIAGNOSIS — E03.9 HYPOTHYROIDISM (ACQUIRED): Chronic | ICD-10-CM

## 2017-03-30 DIAGNOSIS — E78.5 HYPERLIPIDEMIA, UNSPECIFIED HYPERLIPIDEMIA TYPE: Chronic | ICD-10-CM

## 2017-03-30 DIAGNOSIS — Z95.0 PACEMAKER: ICD-10-CM

## 2017-03-30 DIAGNOSIS — I25.10 CORONARY ARTERY DISEASE, ANGINA PRESENCE UNSPECIFIED, UNSPECIFIED VESSEL OR LESION TYPE, UNSPECIFIED WHETHER NATIVE OR TRANSPLANTED HEART: ICD-10-CM

## 2017-03-30 DIAGNOSIS — N28.9 RENAL INSUFFICIENCY: ICD-10-CM

## 2017-03-30 DIAGNOSIS — Z95.5 S/P CORONARY ARTERY STENT PLACEMENT: ICD-10-CM

## 2017-03-30 DIAGNOSIS — I25.10 CAD, MULTIPLE VESSEL: Chronic | ICD-10-CM

## 2017-03-30 PROCEDURE — 1159F MED LIST DOCD IN RCRD: CPT | Mod: S$GLB,,, | Performed by: INTERNAL MEDICINE

## 2017-03-30 PROCEDURE — 99999 PR PBB SHADOW E&M-EST. PATIENT-LVL III: CPT | Mod: PBBFAC,,, | Performed by: INTERNAL MEDICINE

## 2017-03-30 PROCEDURE — 1160F RVW MEDS BY RX/DR IN RCRD: CPT | Mod: S$GLB,,, | Performed by: INTERNAL MEDICINE

## 2017-03-30 PROCEDURE — 99213 OFFICE O/P EST LOW 20 MIN: CPT | Mod: S$GLB,,, | Performed by: INTERNAL MEDICINE

## 2017-03-30 PROCEDURE — 1157F ADVNC CARE PLAN IN RCRD: CPT | Mod: S$GLB,,, | Performed by: INTERNAL MEDICINE

## 2017-03-30 PROCEDURE — 3077F SYST BP >= 140 MM HG: CPT | Mod: S$GLB,,, | Performed by: INTERNAL MEDICINE

## 2017-03-30 PROCEDURE — 99499 UNLISTED E&M SERVICE: CPT | Mod: S$GLB,,, | Performed by: INTERNAL MEDICINE

## 2017-03-30 PROCEDURE — 93000 ELECTROCARDIOGRAM COMPLETE: CPT | Mod: S$GLB,,, | Performed by: INTERNAL MEDICINE

## 2017-03-30 PROCEDURE — 3080F DIAST BP >= 90 MM HG: CPT | Mod: S$GLB,,, | Performed by: INTERNAL MEDICINE

## 2017-03-30 RX ORDER — GABAPENTIN 100 MG/1
300 CAPSULE ORAL NIGHTLY
COMMUNITY
End: 2017-07-05 | Stop reason: SDUPTHER

## 2017-03-30 NOTE — PROGRESS NOTES
Subjective:   Patient ID:  Violet Swenson is a 77 y.o. female who presents for follow up of Coronary Artery Disease      HPI  A 78 yo female with h/o cad s/p lad stent htn hlp is usually followed by DR FOURNIER is establishing care with me. 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 Has no chest pain or shortness of breath she  is doing well clinically takes care of her sick . She is s/p pacer .has no chf issues . Had gastric bypass in 1993. She has no other issues clinically cardiac wise.   Past Medical History:   Diagnosis Date    Arthritis     Coronary artery disease     Depression     GERD (gastroesophageal reflux disease)     Gout, arthritis     Hyperlipidemia     Hypertension     Hypothyroidism     Lung nodule 2014    RML--stable    Pneumonia        Past Surgical History:   Procedure Laterality Date    APPENDECTOMY      CARDIAC PACEMAKER PLACEMENT  01/22/2015    CHOLECYSTECTOMY      CORONARY ANGIOPLASTY  02/2014    CORONARY STENT PLACEMENT  02/05/2014    GASTRIC BYPASS      HERNIA REPAIR      TONSILLECTOMY      TOTAL KNEE ARTHROPLASTY Bilateral        Social History   Substance Use Topics    Smoking status: Former Smoker    Smokeless tobacco: None    Alcohol use No       Family History   Problem Relation Age of Onset    Heart disease Mother     Hypertension Mother     Stomach cancer Father     Pancreatic cancer Sister     Stomach cancer Brother        Current Outpatient Prescriptions   Medication Sig    allopurinol (ZYLOPRIM) 100 MG tablet TAKE 2 TABLET BY MOUTH DAILY    aspirin (ECOTRIN) 81 MG EC tablet Take 81 mg by mouth once daily.     baicalin-catechin 500 mg Cap Take 500 mg by mouth 2 (two) times daily.    clopidogrel (PLAVIX) 75 mg tablet Take 1 tablet (75 mg total) by mouth once daily.    colchicine 0.6 mg tablet TAKE 1 TABLET(0.6 MG) BY MOUTH EVERY DAY    ergocalciferol, vitamin D2, 400 unit Tab Take by mouth daily.    gabapentin  (NEURONTIN) 100 MG capsule Take 100 mg by mouth 3 (three) times daily.    hydrochlorothiazide (HYDRODIURIL) 12.5 MG Tab TAKE 1 TABLET BY MOUTH ONCE DAILY    isosorbide mononitrate (IMDUR) 30 MG 24 hr tablet TAKE 1 TABLET BY MOUTH EVERY DAY    levothyroxine (SYNTHROID) 75 MCG tablet TAKE 1 TABLET(75 MCG) BY MOUTH BEFORE BREAKFAST    meloxicam (MOBIC) 7.5 MG tablet TAKE 1 TABLET BY MOUTH ONCE DAILY AS NEEDED FOR PAIN    multivitamin capsule Take 1 capsule by mouth once daily.    ranitidine (ZANTAC) 150 MG tablet Take 150 mg by mouth nightly.    rosuvastatin (CRESTOR) 10 MG tablet Take 1 tablet (10 mg total) by mouth once daily.    sertraline (ZOLOFT) 100 MG tablet Take 1 tablet (100 mg total) by mouth once daily.    turmeric root extract 500 mg Cap Take 500 mg by mouth 2 (two) times daily.    verapamil (CALAN-SR) 120 MG CR tablet Take 1 tablet (120 mg total) by mouth nightly. One-half tablet by mouth nightly    ZINC ACETATE ORAL 250 mg.    fluticasone (FLONASE) 50 mcg/actuation nasal spray 2 sprays by Each Nare route once daily.    nitroglycerin 400 mcg/spray SprA Place onto the tongue.     No current facility-administered medications for this visit.      Current Outpatient Prescriptions on File Prior to Visit   Medication Sig    allopurinol (ZYLOPRIM) 100 MG tablet TAKE 2 TABLET BY MOUTH DAILY    aspirin (ECOTRIN) 81 MG EC tablet Take 81 mg by mouth once daily.     baicalin-catechin 500 mg Cap Take 500 mg by mouth 2 (two) times daily.    clopidogrel (PLAVIX) 75 mg tablet Take 1 tablet (75 mg total) by mouth once daily.    colchicine 0.6 mg tablet TAKE 1 TABLET(0.6 MG) BY MOUTH EVERY DAY    ergocalciferol, vitamin D2, 400 unit Tab Take by mouth daily.    hydrochlorothiazide (HYDRODIURIL) 12.5 MG Tab TAKE 1 TABLET BY MOUTH ONCE DAILY    isosorbide mononitrate (IMDUR) 30 MG 24 hr tablet TAKE 1 TABLET BY MOUTH EVERY DAY    levothyroxine (SYNTHROID) 75 MCG tablet TAKE 1 TABLET(75 MCG) BY MOUTH BEFORE  BREAKFAST    meloxicam (MOBIC) 7.5 MG tablet TAKE 1 TABLET BY MOUTH ONCE DAILY AS NEEDED FOR PAIN    ranitidine (ZANTAC) 150 MG tablet Take 150 mg by mouth nightly.    rosuvastatin (CRESTOR) 10 MG tablet Take 1 tablet (10 mg total) by mouth once daily.    sertraline (ZOLOFT) 100 MG tablet Take 1 tablet (100 mg total) by mouth once daily.    turmeric root extract 500 mg Cap Take 500 mg by mouth 2 (two) times daily.    verapamil (CALAN-SR) 120 MG CR tablet Take 1 tablet (120 mg total) by mouth nightly. One-half tablet by mouth nightly    ZINC ACETATE ORAL 250 mg.    [DISCONTINUED] gabapentin (NEURONTIN) 100 MG capsule TAKE 1 CAPSULE(100 MG) BY MOUTH THREE TIMES DAILY    fluticasone (FLONASE) 50 mcg/actuation nasal spray 2 sprays by Each Nare route once daily.    nitroglycerin 400 mcg/spray SprA Place onto the tongue.    [DISCONTINUED] fluocinolone acetonide oil 0.01 % Drop Place 5 drops in ear(s) 2 (two) times daily. 5 drops in affected ear bid x 7-14 days    [DISCONTINUED] meclizine (ANTIVERT) 25 mg tablet Take 1 tablet (25 mg total) by mouth 3 (three) times daily as needed for Dizziness.    [DISCONTINUED] tizanidine (ZANAFLEX) 2 MG tablet Take 1 tablet (2 mg total) by mouth nightly as needed (muscle spasms).     No current facility-administered medications on file prior to visit.      Review of patient's allergies indicates:   Allergen Reactions    Corticosteroids (glucocorticoids) Nausea Only and Other (See Comments)     Stomach pain, dizziness, headache    Oxycodone Other (See Comments)     Blood pressure dropped       Review of Systems   Constitution: Negative for diaphoresis, weakness, malaise/fatigue and weight gain.   HENT: Negative for headaches and hoarse voice.    Eyes: Negative for double vision and visual disturbance.   Cardiovascular: Negative for chest pain, claudication, cyanosis, dyspnea on exertion, irregular heartbeat, leg swelling, near-syncope, orthopnea, palpitations, paroxysmal  nocturnal dyspnea and syncope.   Respiratory: Negative for cough, hemoptysis, shortness of breath and snoring.    Hematologic/Lymphatic: Negative for bleeding problem. Does not bruise/bleed easily.   Skin: Negative for color change and poor wound healing.   Musculoskeletal: Negative for muscle cramps, muscle weakness and myalgias.   Gastrointestinal: Negative for bloating, abdominal pain, change in bowel habit, diarrhea, heartburn, hematemesis, hematochezia, melena and nausea.   Neurological: Negative for excessive daytime sleepiness, dizziness, light-headedness, loss of balance and numbness.   Psychiatric/Behavioral: Negative for memory loss. The patient has insomnia and is nervous/anxious.    Allergic/Immunologic: Negative for hives.       Objective:   Physical Exam   Constitutional: She is oriented to person, place, and time. She appears well-developed and well-nourished. She does not appear ill. No distress.   HENT:   Head: Normocephalic and atraumatic.   Eyes: EOM are normal. Pupils are equal, round, and reactive to light. No scleral icterus.   Neck: Normal range of motion. Neck supple. Normal carotid pulses, no hepatojugular reflux and no JVD present. Carotid bruit is not present. No tracheal deviation present. No thyromegaly present.   Cardiovascular: Normal rate, regular rhythm, normal heart sounds, intact distal pulses and normal pulses.  Exam reveals no gallop and no friction rub.    No murmur heard.  Pulmonary/Chest: Effort normal and breath sounds normal. No respiratory distress. She has no wheezes. She has no rhonchi. She has no rales. She exhibits no tenderness.   Pacer site well healed    Abdominal: Soft. Normal appearance, normal aorta and bowel sounds are normal. She exhibits no distension, no abdominal bruit, no ascites and no pulsatile midline mass. There is no hepatomegaly. There is no tenderness.   Musculoskeletal: She exhibits no edema.        Right shoulder: She exhibits no deformity.  "  Varicose veins in legs noted.   Neurological: She is alert and oriented to person, place, and time. She has normal strength. No cranial nerve deficit. Coordination normal.   Skin: Skin is warm and dry. No rash noted. She is not diaphoretic. No cyanosis or erythema. Nails show no clubbing.   Psychiatric: She has a normal mood and affect. Her speech is normal and behavior is normal.   Nursing note and vitals reviewed.    Vitals:    03/30/17 1102   BP: (!) 154/90   Pulse: 74   Weight: 85.6 kg (188 lb 11.4 oz)   Height: 5' 4" (1.626 m)     Lab Results   Component Value Date    CHOL 114 (L) 11/07/2016    CHOL 107 (L) 10/03/2016    CHOL 117 (L) 04/26/2016     Lab Results   Component Value Date    HDL 51 11/07/2016    HDL 52 10/03/2016    HDL 53 04/26/2016     Lab Results   Component Value Date    LDLCALC 41.8 (L) 11/07/2016    LDLCALC 38.6 (L) 10/03/2016    LDLCALC 41.2 (L) 04/26/2016     Lab Results   Component Value Date    TRIG 106 11/07/2016    TRIG 82 10/03/2016    TRIG 114 04/26/2016     Lab Results   Component Value Date    CHOLHDL 44.7 11/07/2016    CHOLHDL 48.6 10/03/2016    CHOLHDL 45.3 04/26/2016       Chemistry        Component Value Date/Time     01/25/2017 0830    K 5.6 (H) 01/25/2017 0830     01/25/2017 0830    CO2 26 01/25/2017 0830    BUN 25 (H) 01/25/2017 0830    CREATININE 1.1 01/25/2017 0830    GLU 93 01/25/2017 0830        Component Value Date/Time    CALCIUM 9.5 01/25/2017 0830    ALKPHOS 72 01/25/2017 0830    AST 32 01/25/2017 0830    ALT 26 01/25/2017 0830    BILITOT 0.4 01/25/2017 0830          Lab Results   Component Value Date    TSH 2.720 12/13/2016     No results found for: INR, PROTIME  Lab Results   Component Value Date    WBC 10.21 04/26/2016    HGB 12.3 04/26/2016    HCT 40.7 04/26/2016    MCV 93 04/26/2016     04/26/2016     BMP  Sodium   Date Value Ref Range Status   01/25/2017 143 136 - 145 mmol/L Final     Potassium   Date Value Ref Range Status   01/25/2017 5.6 " (H) 3.5 - 5.1 mmol/L Final     Chloride   Date Value Ref Range Status   01/25/2017 109 95 - 110 mmol/L Final     CO2   Date Value Ref Range Status   01/25/2017 26 23 - 29 mmol/L Final     BUN, Bld   Date Value Ref Range Status   01/25/2017 25 (H) 8 - 23 mg/dL Final     Creatinine   Date Value Ref Range Status   01/25/2017 1.1 0.5 - 1.4 mg/dL Final     Calcium   Date Value Ref Range Status   01/25/2017 9.5 8.7 - 10.5 mg/dL Final     Anion Gap   Date Value Ref Range Status   01/25/2017 8 8 - 16 mmol/L Final     eGFR if    Date Value Ref Range Status   01/25/2017 57 (A) >60 mL/min/1.73 m^2 Final     eGFR if non    Date Value Ref Range Status   01/25/2017 49 (A) >60 mL/min/1.73 m^2 Final     Comment:     Calculation used to obtain the estimated glomerular filtration  rate (eGFR) is the CKD-EPI equation. Since race is unknown   in our information system, the eGFR values for   -American and Non--American patients are given   for each creatinine result.       CrCl cannot be calculated (Patient has no serum creatinine result on file.).  ekg showed paced rythm  Assessment:     1. Coronary artery disease involving native coronary artery of native heart without angina pectoris    2. Renal insufficiency    3. Pacemaker    4. S/P coronary artery stent placement    5. CAD, multiple vessel    6. Hypothyroidism (acquired)    7. Hyperlipidemia, unspecified hyperlipidemia type    8. Essential hypertension      Asymptomatic cardiac wise. Has no angina .lipids on target.   Plan:   Continue current therapy  Cardiac low salt diet.  Risk factor modification and excercise program.  F/u in 6 months with lipid cmp .;.

## 2017-03-30 NOTE — MR AVS SNAPSHOT
[facility]  [address] [facility] [address]  Phone: [phone]  Fax: [phone]                  Violet Swenson   3/30/2017 10:15 AM   Office Visit    Description:  Female : 1940   Provider:  Nader Gil MD   Department:  [facility]           Reason for Visit     Coronary Artery Disease           Diagnoses this Visit        Comments    Coronary artery disease involving native coronary artery of native heart without angina pectoris    -  Primary     Renal insufficiency         Pacemaker         S/P coronary artery stent placement         CAD, multiple vessel         Hypothyroidism (acquired)         Hyperlipidemia, unspecified hyperlipidemia type         Essential hypertension                To Do List           Future Appointments        Provider Department Dept Phone    2017 8:20 AM Jackelin Layton NP [facility] - Internal Medicine [phone]    2017 10:00 AM Dilshad Rogers MD [facility] - Rheumatology [phone]      Goals (5 Years of Data)     None      Follow-Up and Disposition     Return in about 6 months (around 2017).      [facility]     [facility] Care [facility] -  Assistance  Unless otherwise directed by your provider, please contact [facility], our nurse care line that is available for  assistance.     Registered nurses in the [facility] provide: appointment scheduling, clinical advisement, health education, and other advisory services.  Call: [phone] (toll free)               Medications           Message regarding Medications     Verify the changes and/or additions to your medication regime listed below are the same as discussed with your clinician today.  If any of these changes or additions are incorrect, please notify your healthcare provider.        STOP taking these medications     fluocinolone acetonide oil 0.01 % Drop Place 5 drops in ear(s) 2 (two) times daily. 5  drops in affected ear bid x 7-14 days    meclizine (ANTIVERT) 25 mg tablet Take 1 tablet (25 mg total) by mouth 3 (three) times daily as needed for Dizziness.    tizanidine (ZANAFLEX) 2 MG tablet Take 1 tablet (2 mg total) by mouth nightly as needed (muscle spasms).           Verify that the below list of medications is an accurate representation of the medications you are currently taking.  If none reported, the list may be blank. If incorrect, please contact your healthcare provider. Carry this list with you in case of emergency.           Current Medications     allopurinol (ZYLOPRIM) 100 MG tablet TAKE 2 TABLET BY MOUTH DAILY    aspirin (ECOTRIN) 81 MG EC tablet Take 81 mg by mouth once daily.     baicalin-catechin 500 mg Cap Take 500 mg by mouth 2 (two) times daily.    clopidogrel (PLAVIX) 75 mg tablet Take 1 tablet (75 mg total) by mouth once daily.    colchicine 0.6 mg tablet TAKE 1 TABLET(0.6 MG) BY MOUTH EVERY DAY    ergocalciferol, vitamin D2, 400 unit Tab Take by mouth daily.    fluticasone (FLONASE) 50 mcg/actuation nasal spray 2 sprays by Each Nare route once daily.    gabapentin (NEURONTIN) 100 MG capsule Take 100 mg by mouth 3 (three) times daily.    hydrochlorothiazide (HYDRODIURIL) 12.5 MG Tab TAKE 1 TABLET BY MOUTH ONCE DAILY    isosorbide mononitrate (IMDUR) 30 MG 24 hr tablet TAKE 1 TABLET BY MOUTH EVERY DAY    levothyroxine (SYNTHROID) 75 MCG tablet TAKE 1 TABLET(75 MCG) BY MOUTH BEFORE BREAKFAST    meloxicam (MOBIC) 7.5 MG tablet TAKE 1 TABLET BY MOUTH ONCE DAILY AS NEEDED FOR PAIN    multivitamin capsule Take 1 capsule by mouth once daily.    nitroglycerin 400 mcg/spray SprA Place onto the tongue.    ranitidine (ZANTAC) 150 MG tablet Take 150 mg by mouth nightly.    rosuvastatin (CRESTOR) 10 MG tablet Take 1 tablet (10 mg total) by mouth once daily.    sertraline (ZOLOFT) 100 MG tablet Take 1 tablet (100 mg total) by mouth once daily.    turmeric root extract 500 mg Cap Take 500 mg by mouth 2  "(two) times daily.    verapamil (CALAN-SR) 120 MG CR tablet Take 1 tablet (120 mg total) by mouth nightly. One-half tablet by mouth nightly    ZINC ACETATE ORAL 250 mg.           Clinical Reference Information           Your Vitals Were     BP Pulse Height Weight BMI    154/90 74 5' 4" (1.626 m) 85.6 kg (188 lb 11.4 oz) 32.39 kg/m2      Blood Pressure          Most Recent Value    Right Arm BP - Sitting  154/90    Left Arm BP - Sitting  152/84    BP  (!)  154/90      Allergies as of 3/30/2017     Corticosteroids (Glucocorticoids)    Oxycodone      Immunizations Administered on Date of Encounter - 3/30/2017     None      Orders Placed During Today's Visit     Future Labs/Procedures Expected by Expires    Comprehensive metabolic panel  9/29/2017 (Approximate) 3/30/2018    Lipid panel  9/29/2017 (Approximate) 3/30/2018      [facility] Sign-Up     Activating your [facility] account is as easy as 1-2-3!     1) Visit [email or web address], select Sign Up Now, enter this activation code and your date of birth, then select Next.  Activation code not generated  Current Patient Portal Status: Account disabled      2) Create a username and password to use when you visit [facility] in the future and select a security question in case you lose your password and select Next.    3) Enter your e-mail address and click Sign Up!    Additional Information  If you have questions, please e-mail [email or web address] or call [phone] to talk to our [facility] staff. Remember, [facility] is NOT to be used for urgent needs. For medical emergencies, dial 911.         Language Assistance Services     ATTENTION: Language assistance services are available, free of charge. Please call [phone].      ATENCIÓN: Si habla español, tiene a castle disposición servicios gratuitos de asistencia lingüística. Llame al [phone].     CHÚ Ý: N?u b?n nói Ti?ng Vi?t, có các d?ch v? h? tr? ngôn ng? mi?n phí dành cho b?n. G?i s? [phone].         [facility] complies with applicable Federal civil rights laws and does not discriminate on the basis of race, color, national origin, age, disability, or sex.

## 2017-04-04 DIAGNOSIS — I10 ESSENTIAL HYPERTENSION: ICD-10-CM

## 2017-04-04 DIAGNOSIS — M1A.09X0 IDIOPATHIC CHRONIC GOUT OF MULTIPLE SITES WITHOUT TOPHUS: ICD-10-CM

## 2017-04-04 RX ORDER — VERAPAMIL HYDROCHLORIDE 120 MG/1
TABLET, FILM COATED, EXTENDED RELEASE ORAL
Qty: 30 TABLET | Refills: 0 | OUTPATIENT
Start: 2017-04-04

## 2017-04-04 RX ORDER — COLCHICINE 0.6 MG/1
TABLET, FILM COATED ORAL
Qty: 30 TABLET | Refills: 3 | Status: SHIPPED | OUTPATIENT
Start: 2017-04-04 | End: 2017-08-16 | Stop reason: SDUPTHER

## 2017-04-05 ENCOUNTER — HOSPITAL ENCOUNTER (INPATIENT)
Facility: HOSPITAL | Age: 77
LOS: 9 days | Discharge: HOME HEALTH | DRG: 330 | End: 2017-04-14
Attending: EMERGENCY MEDICINE | Admitting: EMERGENCY MEDICINE
Payer: MEDICARE

## 2017-04-05 DIAGNOSIS — R74.8 ELEVATED AMYLASE AND LIPASE: ICD-10-CM

## 2017-04-05 DIAGNOSIS — R93.3 ABNORMAL COMPUTED TOMOGRAPHY OF CECUM AND TERMINAL ILEUM: ICD-10-CM

## 2017-04-05 DIAGNOSIS — R07.9 CHEST PAIN: Primary | ICD-10-CM

## 2017-04-05 DIAGNOSIS — C18.0 MALIGNANT NEOPLASM OF ILEOCECAL VALVE: ICD-10-CM

## 2017-04-05 DIAGNOSIS — K57.30 DIVERTICULOSIS OF LARGE INTESTINE WITHOUT HEMORRHAGE: ICD-10-CM

## 2017-04-05 DIAGNOSIS — R19.03 ABDOMINAL MASS, RLQ (RIGHT LOWER QUADRANT): ICD-10-CM

## 2017-04-05 DIAGNOSIS — R79.89 ELEVATED TROPONIN: ICD-10-CM

## 2017-04-05 DIAGNOSIS — R11.2 NON-INTRACTABLE VOMITING WITH NAUSEA, UNSPECIFIED VOMITING TYPE: ICD-10-CM

## 2017-04-05 DIAGNOSIS — R10.30 LOWER ABDOMINAL PAIN: ICD-10-CM

## 2017-04-05 DIAGNOSIS — I10 ESSENTIAL HYPERTENSION: ICD-10-CM

## 2017-04-05 DIAGNOSIS — K63.89 SMALL BOWEL MASS: ICD-10-CM

## 2017-04-05 DIAGNOSIS — N28.9 RENAL INSUFFICIENCY: ICD-10-CM

## 2017-04-05 PROBLEM — R10.9 ABDOMINAL PAIN: Status: ACTIVE | Noted: 2017-04-05

## 2017-04-05 LAB
ALBUMIN SERPL BCP-MCNC: 4.2 G/DL
ALP SERPL-CCNC: 81 U/L
ALT SERPL W/O P-5'-P-CCNC: 22 U/L
AMYLASE SERPL-CCNC: 189 U/L
ANION GAP SERPL CALC-SCNC: 15 MMOL/L
APTT BLDCRRT: 26.3 SEC
AST SERPL-CCNC: 37 U/L
BACTERIA #/AREA URNS HPF: ABNORMAL /HPF
BASOPHILS # BLD AUTO: 0.02 K/UL
BASOPHILS NFR BLD: 0.2 %
BILIRUB SERPL-MCNC: 0.4 MG/DL
BILIRUB UR QL STRIP: ABNORMAL
BUN SERPL-MCNC: 17 MG/DL
CALCIUM SERPL-MCNC: 10 MG/DL
CHLORIDE SERPL-SCNC: 98 MMOL/L
CK SERPL-CCNC: 119 U/L
CK SERPL-CCNC: 171 U/L
CLARITY UR: CLEAR
CO2 SERPL-SCNC: 24 MMOL/L
COLOR UR: YELLOW
CREAT SERPL-MCNC: 1.1 MG/DL
DIASTOLIC DYSFUNCTION: YES
DIFFERENTIAL METHOD: ABNORMAL
EOSINOPHIL # BLD AUTO: 0 K/UL
EOSINOPHIL NFR BLD: 0.2 %
ERYTHROCYTE [DISTWIDTH] IN BLOOD BY AUTOMATED COUNT: 16.5 %
EST. GFR  (AFRICAN AMERICAN): 56 ML/MIN/1.73 M^2
EST. GFR  (NON AFRICAN AMERICAN): 49 ML/MIN/1.73 M^2
ESTIMATED PA SYSTOLIC PRESSURE: 29.21
GLUCOSE SERPL-MCNC: 119 MG/DL
GLUCOSE UR QL STRIP: NEGATIVE
HCT VFR BLD AUTO: 40.4 %
HGB BLD-MCNC: 13 G/DL
HGB UR QL STRIP: ABNORMAL
HYALINE CASTS #/AREA URNS LPF: 0 /LPF
INR PPP: 1
KETONES UR QL STRIP: ABNORMAL
LACTATE SERPL-SCNC: 1.5 MMOL/L
LEUKOCYTE ESTERASE UR QL STRIP: NEGATIVE
LIPASE SERPL-CCNC: 351 U/L
LYMPHOCYTES # BLD AUTO: 3.4 K/UL
LYMPHOCYTES NFR BLD: 25.6 %
MCH RBC QN AUTO: 28.4 PG
MCHC RBC AUTO-ENTMCNC: 32.2 %
MCV RBC AUTO: 88 FL
MICROSCOPIC COMMENT: ABNORMAL
MITRAL VALVE REGURGITATION: ABNORMAL
MONOCYTES # BLD AUTO: 0.8 K/UL
MONOCYTES NFR BLD: 6.3 %
NEUTROPHILS # BLD AUTO: 9 K/UL
NEUTROPHILS NFR BLD: 67.7 %
NITRITE UR QL STRIP: NEGATIVE
PH UR STRIP: 6 [PH] (ref 5–8)
PLATELET # BLD AUTO: 334 K/UL
PMV BLD AUTO: 10.1 FL
POTASSIUM SERPL-SCNC: 4.6 MMOL/L
PROT SERPL-MCNC: 8.6 G/DL
PROT UR QL STRIP: ABNORMAL
PROTHROMBIN TIME: 10 SEC
RBC # BLD AUTO: 4.57 M/UL
RBC #/AREA URNS HPF: 5 /HPF (ref 0–4)
RETIRED EF AND QEF - SEE NOTES: 40 (ref 55–65)
SODIUM SERPL-SCNC: 137 MMOL/L
SP GR UR STRIP: 1.02 (ref 1–1.03)
SQUAMOUS #/AREA URNS HPF: 3 /HPF
TROPONIN I SERPL DL<=0.01 NG/ML-MCNC: 0.17 NG/ML
TROPONIN I SERPL DL<=0.01 NG/ML-MCNC: 0.18 NG/ML
URN SPEC COLLECT METH UR: ABNORMAL
UROBILINOGEN UR STRIP-ACNC: 1 EU/DL
WBC # BLD AUTO: 13.25 K/UL
WBC #/AREA URNS HPF: 4 /HPF (ref 0–5)

## 2017-04-05 PROCEDURE — 82550 ASSAY OF CK (CPK): CPT

## 2017-04-05 PROCEDURE — 96374 THER/PROPH/DIAG INJ IV PUSH: CPT

## 2017-04-05 PROCEDURE — C9113 INJ PANTOPRAZOLE SODIUM, VIA: HCPCS | Performed by: EMERGENCY MEDICINE

## 2017-04-05 PROCEDURE — 63600175 PHARM REV CODE 636 W HCPCS: Performed by: NURSE PRACTITIONER

## 2017-04-05 PROCEDURE — 96361 HYDRATE IV INFUSION ADD-ON: CPT

## 2017-04-05 PROCEDURE — 83690 ASSAY OF LIPASE: CPT

## 2017-04-05 PROCEDURE — 25000003 PHARM REV CODE 250: Performed by: PHYSICIAN ASSISTANT

## 2017-04-05 PROCEDURE — 85025 COMPLETE CBC W/AUTO DIFF WBC: CPT

## 2017-04-05 PROCEDURE — 81000 URINALYSIS NONAUTO W/SCOPE: CPT

## 2017-04-05 PROCEDURE — 80061 LIPID PANEL: CPT

## 2017-04-05 PROCEDURE — 93010 ELECTROCARDIOGRAM REPORT: CPT | Mod: ,,, | Performed by: INTERNAL MEDICINE

## 2017-04-05 PROCEDURE — 83605 ASSAY OF LACTIC ACID: CPT

## 2017-04-05 PROCEDURE — 93306 TTE W/DOPPLER COMPLETE: CPT | Mod: 26,,, | Performed by: INTERNAL MEDICINE

## 2017-04-05 PROCEDURE — 63600175 PHARM REV CODE 636 W HCPCS: Performed by: EMERGENCY MEDICINE

## 2017-04-05 PROCEDURE — 93306 TTE W/DOPPLER COMPLETE: CPT

## 2017-04-05 PROCEDURE — 25000003 PHARM REV CODE 250: Performed by: EMERGENCY MEDICINE

## 2017-04-05 PROCEDURE — 82150 ASSAY OF AMYLASE: CPT

## 2017-04-05 PROCEDURE — 36415 COLL VENOUS BLD VENIPUNCTURE: CPT

## 2017-04-05 PROCEDURE — 99215 OFFICE O/P EST HI 40 MIN: CPT | Mod: ,,, | Performed by: INTERNAL MEDICINE

## 2017-04-05 PROCEDURE — 82550 ASSAY OF CK (CPK): CPT | Mod: 91

## 2017-04-05 PROCEDURE — 96375 TX/PRO/DX INJ NEW DRUG ADDON: CPT

## 2017-04-05 PROCEDURE — 84484 ASSAY OF TROPONIN QUANT: CPT | Mod: 91

## 2017-04-05 PROCEDURE — 21400001 HC TELEMETRY ROOM

## 2017-04-05 PROCEDURE — 85730 THROMBOPLASTIN TIME PARTIAL: CPT

## 2017-04-05 PROCEDURE — 25500020 PHARM REV CODE 255: Performed by: EMERGENCY MEDICINE

## 2017-04-05 PROCEDURE — 80053 COMPREHEN METABOLIC PANEL: CPT

## 2017-04-05 PROCEDURE — 85610 PROTHROMBIN TIME: CPT

## 2017-04-05 PROCEDURE — 99223 1ST HOSP IP/OBS HIGH 75: CPT | Mod: ,,, | Performed by: PHYSICIAN ASSISTANT

## 2017-04-05 PROCEDURE — 93005 ELECTROCARDIOGRAM TRACING: CPT

## 2017-04-05 PROCEDURE — 96376 TX/PRO/DX INJ SAME DRUG ADON: CPT

## 2017-04-05 PROCEDURE — 99285 EMERGENCY DEPT VISIT HI MDM: CPT | Mod: 25

## 2017-04-05 PROCEDURE — 84484 ASSAY OF TROPONIN QUANT: CPT

## 2017-04-05 RX ORDER — LEVOTHYROXINE SODIUM 75 UG/1
75 TABLET ORAL
Status: DISCONTINUED | OUTPATIENT
Start: 2017-04-06 | End: 2017-04-07

## 2017-04-05 RX ORDER — NITROGLYCERIN 0.4 MG/1
0.4 TABLET SUBLINGUAL EVERY 5 MIN PRN
Status: DISCONTINUED | OUTPATIENT
Start: 2017-04-05 | End: 2017-04-07

## 2017-04-05 RX ORDER — ASPIRIN 81 MG/1
81 TABLET ORAL DAILY
Status: DISCONTINUED | OUTPATIENT
Start: 2017-04-06 | End: 2017-04-06

## 2017-04-05 RX ORDER — SERTRALINE HYDROCHLORIDE 50 MG/1
100 TABLET, FILM COATED ORAL DAILY
Status: DISCONTINUED | OUTPATIENT
Start: 2017-04-06 | End: 2017-04-07

## 2017-04-05 RX ORDER — POLYETHYLENE GLYCOL 3350 17 G/17G
238 POWDER, FOR SOLUTION ORAL ONCE
Status: COMPLETED | OUTPATIENT
Start: 2017-04-05 | End: 2017-04-05

## 2017-04-05 RX ORDER — ISOSORBIDE MONONITRATE 30 MG/1
30 TABLET, EXTENDED RELEASE ORAL DAILY
Status: DISCONTINUED | OUTPATIENT
Start: 2017-04-06 | End: 2017-04-07

## 2017-04-05 RX ORDER — CLOPIDOGREL BISULFATE 75 MG/1
75 TABLET ORAL DAILY
Status: DISCONTINUED | OUTPATIENT
Start: 2017-04-06 | End: 2017-04-06

## 2017-04-05 RX ORDER — VERAPAMIL HYDROCHLORIDE 120 MG/1
TABLET, FILM COATED, EXTENDED RELEASE ORAL
Qty: 30 TABLET | Refills: 6 | Status: SHIPPED | OUTPATIENT
Start: 2017-04-05 | End: 2017-08-04

## 2017-04-05 RX ORDER — BISACODYL 5 MG
20 TABLET, DELAYED RELEASE (ENTERIC COATED) ORAL ONCE
Status: COMPLETED | OUTPATIENT
Start: 2017-04-05 | End: 2017-04-05

## 2017-04-05 RX ORDER — MORPHINE SULFATE 2 MG/ML
2 INJECTION, SOLUTION INTRAMUSCULAR; INTRAVENOUS
Status: COMPLETED | OUTPATIENT
Start: 2017-04-05 | End: 2017-04-05

## 2017-04-05 RX ORDER — NAPROXEN SODIUM 220 MG/1
81 TABLET, FILM COATED ORAL
Status: COMPLETED | OUTPATIENT
Start: 2017-04-05 | End: 2017-04-05

## 2017-04-05 RX ORDER — HYDROCHLOROTHIAZIDE 12.5 MG/1
12.5 TABLET ORAL DAILY
Status: DISCONTINUED | OUTPATIENT
Start: 2017-04-06 | End: 2017-04-06

## 2017-04-05 RX ORDER — PANTOPRAZOLE SODIUM 40 MG/10ML
40 INJECTION, POWDER, LYOPHILIZED, FOR SOLUTION INTRAVENOUS DAILY
Status: DISCONTINUED | OUTPATIENT
Start: 2017-04-05 | End: 2017-04-11

## 2017-04-05 RX ORDER — ROSUVASTATIN CALCIUM 10 MG/1
10 TABLET, COATED ORAL DAILY
Status: DISCONTINUED | OUTPATIENT
Start: 2017-04-06 | End: 2017-04-07

## 2017-04-05 RX ORDER — DIPHENHYDRAMINE HYDROCHLORIDE 50 MG/ML
12.5 INJECTION INTRAMUSCULAR; INTRAVENOUS EVERY 6 HOURS PRN
Status: DISCONTINUED | OUTPATIENT
Start: 2017-04-05 | End: 2017-04-14 | Stop reason: HOSPADM

## 2017-04-05 RX ORDER — HEPARIN SODIUM 5000 [USP'U]/ML
5000 INJECTION, SOLUTION INTRAVENOUS; SUBCUTANEOUS EVERY 8 HOURS
Status: DISCONTINUED | OUTPATIENT
Start: 2017-04-05 | End: 2017-04-07

## 2017-04-05 RX ORDER — SODIUM CHLORIDE 9 MG/ML
INJECTION, SOLUTION INTRAVENOUS CONTINUOUS
Status: DISCONTINUED | OUTPATIENT
Start: 2017-04-05 | End: 2017-04-07

## 2017-04-05 RX ORDER — ALLOPURINOL 100 MG/1
200 TABLET ORAL DAILY
Status: DISCONTINUED | OUTPATIENT
Start: 2017-04-06 | End: 2017-04-07

## 2017-04-05 RX ORDER — ONDANSETRON 2 MG/ML
4 INJECTION INTRAMUSCULAR; INTRAVENOUS ONCE
Status: COMPLETED | OUTPATIENT
Start: 2017-04-05 | End: 2017-04-05

## 2017-04-05 RX ORDER — PANTOPRAZOLE SODIUM 40 MG/10ML
40 INJECTION, POWDER, LYOPHILIZED, FOR SOLUTION INTRAVENOUS
Status: COMPLETED | OUTPATIENT
Start: 2017-04-05 | End: 2017-04-05

## 2017-04-05 RX ORDER — ONDANSETRON 2 MG/ML
4 INJECTION INTRAMUSCULAR; INTRAVENOUS EVERY 8 HOURS PRN
Status: DISCONTINUED | OUTPATIENT
Start: 2017-04-05 | End: 2017-04-12

## 2017-04-05 RX ORDER — MORPHINE SULFATE 2 MG/ML
2 INJECTION, SOLUTION INTRAMUSCULAR; INTRAVENOUS EVERY 4 HOURS PRN
Status: DISCONTINUED | OUTPATIENT
Start: 2017-04-05 | End: 2017-04-06

## 2017-04-05 RX ADMIN — PANTOPRAZOLE SODIUM 40 MG: 40 INJECTION, POWDER, FOR SOLUTION INTRAVENOUS at 08:04

## 2017-04-05 RX ADMIN — ASPIRIN 81 MG CHEWABLE TABLET 81 MG: 81 TABLET CHEWABLE at 10:04

## 2017-04-05 RX ADMIN — SODIUM CHLORIDE 1000 ML: 0.9 INJECTION, SOLUTION INTRAVENOUS at 08:04

## 2017-04-05 RX ADMIN — SODIUM CHLORIDE: 0.9 INJECTION, SOLUTION INTRAVENOUS at 01:04

## 2017-04-05 RX ADMIN — HEPARIN SODIUM 5000 UNITS: 5000 INJECTION, SOLUTION INTRAVENOUS; SUBCUTANEOUS at 04:04

## 2017-04-05 RX ADMIN — ONDANSETRON 4 MG: 2 INJECTION INTRAMUSCULAR; INTRAVENOUS at 06:04

## 2017-04-05 RX ADMIN — MORPHINE SULFATE 2 MG: 2 INJECTION, SOLUTION INTRAMUSCULAR; INTRAVENOUS at 11:04

## 2017-04-05 RX ADMIN — PANTOPRAZOLE SODIUM 40 MG: 40 INJECTION, POWDER, FOR SOLUTION INTRAVENOUS at 01:04

## 2017-04-05 RX ADMIN — MORPHINE SULFATE 2 MG: 2 INJECTION, SOLUTION INTRAMUSCULAR; INTRAVENOUS at 08:04

## 2017-04-05 RX ADMIN — IOHEXOL 30 ML: 350 INJECTION, SOLUTION INTRAVENOUS at 08:04

## 2017-04-05 RX ADMIN — MORPHINE SULFATE 2 MG: 2 INJECTION, SOLUTION INTRAMUSCULAR; INTRAVENOUS at 01:04

## 2017-04-05 RX ADMIN — DIPHENHYDRAMINE HYDROCHLORIDE 12.5 MG: 50 INJECTION, SOLUTION INTRAMUSCULAR; INTRAVENOUS at 06:04

## 2017-04-05 RX ADMIN — ONDANSETRON 4 MG: 2 INJECTION INTRAMUSCULAR; INTRAVENOUS at 08:04

## 2017-04-05 RX ADMIN — MORPHINE SULFATE 2 MG: 2 INJECTION, SOLUTION INTRAMUSCULAR; INTRAVENOUS at 04:04

## 2017-04-05 RX ADMIN — POLYETHYLENE GLYCOL 3350 238 G: 17 POWDER, FOR SOLUTION ORAL at 04:04

## 2017-04-05 RX ADMIN — BISACODYL 20 MG: 5 TABLET, COATED ORAL at 04:04

## 2017-04-05 RX ADMIN — IOHEXOL 75 ML: 350 INJECTION, SOLUTION INTRAVENOUS at 11:04

## 2017-04-05 RX ADMIN — HEPARIN SODIUM 5000 UNITS: 5000 INJECTION, SOLUTION INTRAVENOUS; SUBCUTANEOUS at 10:04

## 2017-04-05 NOTE — ASSESSMENT & PLAN NOTE
The patient reported chest pain which resolved. It was in relation to the other GI symptoms. EKG unremarkable. Troponin elevated. Repeat labs pending.

## 2017-04-05 NOTE — ED NOTES
Pt lying in bed resting. NAD noted. Pt oriented x 4. Pt RR even and unlabored. Pt denies SOB. BBS clear to ascultation. Pt NSR on cardiac monitor with rate of 78. Pts abdomen is soft and nondistended. Pt reports moderate periumbilical and lower abdominal pain. Mild tenderness to palpation. Bowel sounds active x 4. Skin warm dry and intact. No edema noted. Pt neurologically intact. Pt denies needs at this time. Bed in low locked position, side rails up x 2, call light in reach, will continue to monitor.

## 2017-04-05 NOTE — ASSESSMENT & PLAN NOTE
- Cardiology following  - 2D ECHO pending  - Serial troponins, Lipid panel, and TSH pendng  - Morphine prn  - Supplemental oxygen prn, keep O2 sats > 92%  - SL Nitro prn  - Continue ASA and Statin

## 2017-04-05 NOTE — H&P
"[facility]  History & Physical    Patient Name: Violet Swenson  MRN: [id number]  Admission Date: 4/5/2017  Attending Physician: Kellee Joshi MD   Primary Care Provider: Marti Coronel MD         Patient information was obtained from patient and ER records.     Subjective:     Principal Problem:Lower abdominal pain    Chief Complaint:   Chief Complaint   Patient presents with    Abdominal Pain     terrible stomach cramps beginning monday with nausea and vomiting         HPI: Violet Swenson is a 77 year old female with a PMHx of Hypothyroidism, HTN, HLD, GERD, Depression, GERD, Depression, CAD, Gastric bypass '93, and PPM who presented to the Emergency Department with c/o generalized abdominal pain x 2 days. Pain describes as "cramping." Associated symptoms include: nausea, vomiting, and chest pain.  ED workup revealed:  WBC 13.25, glucose 119, lipase 351, amylase 189, troponin 0.179. CT abdomen/pelvis with soft tissue mass in the RUQ, recommend endoscopy with biopsy. CXR negative. ED discussed case with Cardiology.     Past Medical History:   Diagnosis Date    Arthritis     Coronary artery disease     Depression     GERD (gastroesophageal reflux disease)     Gout, arthritis     Hyperlipidemia     Hypertension     Hypothyroidism     Lung nodule 2014    RML--stable    Pneumonia        Past Surgical History:   Procedure Laterality Date    APPENDECTOMY      CARDIAC PACEMAKER PLACEMENT  01/22/2015    CHOLECYSTECTOMY      CORONARY ANGIOPLASTY  02/2014    CORONARY STENT PLACEMENT  02/05/2014    GASTRIC BYPASS      HERNIA REPAIR      TONSILLECTOMY      TOTAL KNEE ARTHROPLASTY Bilateral        Review of patient's allergies indicates:   Allergen Reactions    Corticosteroids (glucocorticoids) Nausea Only and Other (See Comments)     Stomach pain, dizziness, headache    Oxycodone Other (See Comments)     Blood pressure dropped       No current " facility-administered medications on file prior to encounter.      Current Outpatient Prescriptions on File Prior to Encounter   Medication Sig    allopurinol (ZYLOPRIM) 100 MG tablet TAKE 2 TABLET BY MOUTH DAILY    aspirin (ECOTRIN) 81 MG EC tablet Take 81 mg by mouth once daily.     baicalin-catechin 500 mg Cap Take 500 mg by mouth 2 (two) times daily.    clopidogrel (PLAVIX) 75 mg tablet Take 1 tablet (75 mg total) by mouth once daily.    ergocalciferol, vitamin D2, 400 unit Tab Take by mouth daily.    fluticasone (FLONASE) 50 mcg/actuation nasal spray 2 sprays by Each Nare route once daily.    gabapentin (NEURONTIN) 100 MG capsule Take 100 mg by mouth 3 (three) times daily.    hydrochlorothiazide (HYDRODIURIL) 12.5 MG Tab TAKE 1 TABLET BY MOUTH ONCE DAILY    isosorbide mononitrate (IMDUR) 30 MG 24 hr tablet TAKE 1 TABLET BY MOUTH EVERY DAY    levothyroxine (SYNTHROID) 75 MCG tablet TAKE 1 TABLET(75 MCG) BY MOUTH BEFORE BREAKFAST    meloxicam (MOBIC) 7.5 MG tablet TAKE 1 TABLET BY MOUTH ONCE DAILY AS NEEDED FOR PAIN    multivitamin capsule Take 1 capsule by mouth once daily.    nitroglycerin 400 mcg/spray SprA Place onto the tongue.    ranitidine (ZANTAC) 150 MG tablet Take 150 mg by mouth nightly.    rosuvastatin (CRESTOR) 10 MG tablet Take 1 tablet (10 mg total) by mouth once daily.    sertraline (ZOLOFT) 100 MG tablet Take 1 tablet (100 mg total) by mouth once daily.    turmeric root extract 500 mg Cap Take 500 mg by mouth 2 (two) times daily.    verapamil (CALAN-SR) 120 MG CR tablet Take 1 tablet (120 mg total) by mouth nightly. One-half tablet by mouth nightly    ZINC ACETATE ORAL 250 mg.    COLCRYS 0.6 mg tablet TAKE 1 TABLET(0.6 MG) BY MOUTH EVERY DAY     Family History     Problem Relation (Age of Onset)    Heart disease Mother    Hypertension Mother    Leukemia Brother    Pancreatic cancer Sister    Stomach cancer Father        Social History Main Topics    Smoking status: Former  Smoker    Smokeless tobacco: Not on file    Alcohol use No    Drug use: No    Sexual activity: Not on file     Review of Systems   Constitutional: Negative for appetite change, chills and fever.   HENT: Negative for trouble swallowing and voice change.    Eyes: Negative.    Respiratory: Negative for apnea, cough, choking, chest tightness, shortness of breath, wheezing and stridor.    Cardiovascular: Positive for chest pain. Negative for palpitations and leg swelling.   Gastrointestinal: Positive for abdominal pain, nausea and vomiting. Negative for blood in stool, constipation, diarrhea and rectal pain.   Endocrine: Negative.    Genitourinary: Negative.    Musculoskeletal: Negative.    Skin: Negative.    Allergic/Immunologic: Negative.    Neurological: Negative for dizziness, tremors, seizures, syncope, facial asymmetry, speech difficulty, weakness, light-headedness, numbness and headaches.   Hematological: Negative.    Psychiatric/Behavioral: Negative.    All other systems reviewed and are negative.    Objective:     Vital Signs (Most Recent):  Temp: 98.1 °F (36.7 °C) (04/05/17 0704)  Pulse: 76 (04/05/17 1318)  Resp: 20 (04/05/17 1318)  BP: 120/75 (04/05/17 1318)  SpO2: 95 % (04/05/17 1318) Vital Signs (24h Range):  Temp:  [98.1 °F (36.7 °C)] 98.1 °F (36.7 °C)  Pulse:  [75-93] 76  Resp:  [11-20] 20  SpO2:  [95 %-98 %] 95 %  BP: (120-175)/() 120/75     Weight: 82.6 kg (182 lb)  Body mass index is 31.24 kg/(m^2).    Physical Exam   Constitutional: She is oriented to person, place, and time. She appears well-developed.   HENT:   Head: Normocephalic and atraumatic.   Eyes: Conjunctivae and EOM are normal.   Neck: Normal range of motion. Neck supple.   Cardiovascular: Normal rate, regular rhythm, normal heart sounds and intact distal pulses.    No murmur heard.  Pulmonary/Chest: Effort normal and breath sounds normal. No respiratory distress. She has no wheezes.   Abdominal: Soft. Bowel sounds are normal.  There is no tenderness.   Musculoskeletal: Normal range of motion.   Neurological: She is alert and oriented to person, place, and time.   Skin: Skin is warm and dry.   Psychiatric: She has a normal mood and affect. Her behavior is normal. Judgment and thought content normal.   Nursing note and vitals reviewed.       Significant Labs:   CBC:   Recent Labs  Lab 04/05/17  0807   WBC 13.25*   HGB 13.0   HCT 40.4        CMP:   Recent Labs  Lab 04/05/17  0807      K 4.6   CL 98   CO2 24   *   BUN 17   CREATININE 1.1   CALCIUM 10.0   PROT 8.6*   ALBUMIN 4.2   BILITOT 0.4   ALKPHOS 81   AST 37   ALT 22   ANIONGAP 15   EGFRNONAA 49*     Coagulation:   Recent Labs  Lab 04/05/17  0807   INR 1.0   APTT 26.3     Lactic Acid:   Recent Labs  Lab 04/05/17  0807   LACTATE 1.5     Lipase:   Recent Labs  Lab 04/05/17  0807   LIPASE 351*     Troponin:   Recent Labs  Lab 04/05/17  0807   TROPONINI 0.179*     Urine Studies:   Recent Labs  Lab 04/05/17  0807   COLORU Yellow   APPEARANCEUA Clear   PHUR 6.0   SPECGRAV 1.025   PROTEINUA 1+*   GLUCUA Negative   KETONESU Trace*   BILIRUBINUA 1+*   OCCULTUA 1+*   NITRITE Negative   UROBILINOGEN 1.0   LEUKOCYTESUR Negative   RBCUA 5*   WBCUA 4   BACTERIA Rare   SQUAMEPITHEL 3   HYALINECASTS 0       Significant Imaging:   Imaging Results         CT Abdomen Pelvis With Contrast (Final result) Result time:  04/05/17 11:18:03    Final result by Elieser Enriquez MD (04/05/17 11:18:03)    Impression:             6.9 x 7.0 x 8.4 cm soft tissue mass in the right lower quadrant centered at the terminal ileum abutting the cecum.  Adjacent conglomerate adenopathy in the right lower quadrant mesentery.  Differential considerations include small bowel adenocarcinoma, exophytic colon cancer, gastrointestinal stromal tumor, or lymphoma.  Recommend endoscopy with biopsy.  There is no evidence of bowel obstruction or perforation.    Mild mucosal edema and mucosal hyperenhancement involving the  gastric remnant.  Correlate with signs and symptoms of gastritis.    1.6 cm indeterminate left adrenal adenoma.  Metastasis not excluded.        Multifocal renal cortical thinning and cortical scarring.  All CT scans at this facility use dose modulation, iterative reconstruction and/or weight based dosing when appropriate to reduce radiation dose to as low as reasonably achievable.       Electronically signed by: ALONZO CHA MD  Date:     04/05/17  Time:    11:18     Narrative:    Exam: CT ABDOMEN PELVIS WITH CONTRAST    Technique: Axial CT imaging was performed through the abdomen and pelvis with  75cc  of intravenous contrast. Multiplanar reformats were performed and interpreted.    Clinical History:  Abdominal pain.  generalized abdominal pain      Comparison:  None     Findings:          Lung bases are clear.    The liver is normal in size and surface contour.  No focal hepatic lesions.  The gallbladder has been removed.  No biliary ductal dilatation.  The pancreas, spleen, and right adrenal gland are within normal limits.  There is a 1.6 cm left adrenal nodule.  Marked lobulation and cortical thinning of both kidneys.  Multifocal renal cortical scarring.  No hydronephrosis.     The patient is status post gastric bypass.  There is submucosal edema and mucosal hyperenhancement of the excluded gastric remnant.  No evidence of bowel obstruction.    In the right lower quadrant, there is a lobular soft tissue mass that measures 6.9 x 7.0 x 8.4 cm that abuts the mesenteric surface of the cecum centered at the terminal ileum.  Multiple enlarged adjacent ileocolic lymph nodes.  The conglomerate mona mass measures up to 4.1 x 2.9 cm.  Colonic diverticulosis.    Aortic atherosclerosis without evidence of aneurysm.     The urinary bladder is unremarkable. The uterus is unremarkable.  No free pelvic fluid.    No significant osseous abnormality is identified.  No suspicious osseous lesions.            X-Ray Chest AP  Portable (Final result) Result time:  04/05/17 08:36:07    Final result by TASHA Kearns Sr., MD (04/05/17 08:36:07)    Impression:        1.  The lungs are clear.  2.  Surgical changes      Electronically signed by: TASHA KEARNS MD  Date:     04/05/17  Time:    08:36     Narrative:    One-view chest x-ray    Clinical History: Chest pain    Finding: A cardiac pacemaker is in place.  The leads appear to be intact.  The size of the heart is normal. The lungs are clear. There is no pneumothorax.  The costophrenic angles are sharp.  Surgical clips are projected over the left upper quadrant of the abdomen.            Assessment/Plan:     * Lower abdominal pain  - Admit to Inpatient  - CT abdomen/pelvis revealed:  Soft tissue mass in the RLQ. Recommend endoscopy with biopsy.   - Inpatient consult to Gastroenterology - Plan for colonoscopy in AM  - Bowel prep with GoLytely  - NPO now  - Analgesics/Antiemetic prn        Chest pain  - Cardiology following  - 2D ECHO revealed EF 40% with combined systolic and diastolic heart failure  - Serial troponins, Lipid panel, and TSH pendng  - Morphine prn  - Supplemental oxygen prn, keep O2 sats > 92%  - SL Nitro prn  - Continue ASA and Statin      Essential hypertension  - Stable  - Continue home medications      Hyperlipidemia  - Continue Statin      Hypothyroidism (acquired)  - Obtain TSH  - Resume Levothyroxine      CAD, multiple vessel  - Continue ASA and Statin   - Will hold Plavix for tomorrow morning. Its possible if colonoscopy cannot obtain biopsy, General surgery will be consulted.        VTE Risk Mitigation         Ordered     Medium Risk of VTE  Once      04/05/17 1441     heparin (porcine) injection 5,000 Units  Every 8 hours     Route:  Subcutaneous        04/05/17 1231        MIROSLAVA Maxwell  Department of Hospital Medicine   [facility]

## 2017-04-05 NOTE — IP AVS SNAPSHOT
[facility]  [id number] [place] [address] [address] [address]           Patient Discharge Instructions   Our goal is to set you up for success. This packet includes information on your condition, medications, and your home care.  It will help you care for yourself to prevent having to return to the hospital.     Please ask your nurse if you have any questions.      There are many details to remember when preparing to leave the hospital. Here is what you will need to do:    1. Take your medicine. If you are prescribed medications, review your Medication List on the following pages. You may have new medications to  at the pharmacy and others that you'll need to stop taking. Review the instructions for how and when to take your medications. Talk with your doctor or nurses if you are unsure of what to do.     2. Go to your follow-up appointments. Specific follow-up information is listed in the following pages. Your may be contacted by a nurse or clinical provider about future appointments. Be sure we have all of the phone numbers to reach you. Please contact your provider's office if you are unable to make an appointment.     3. Watch for warning signs. Your doctor or nurse will give you detailed warning signs to watch for and when to call for assistance. These instructions may also include educational information about your condition. If you experience any of warning signs to your health, call your doctor.               ** Verify the list of medication(s) below is accurate and up to date. Carry this with you in case of emergency. If your medications have changed, please notify your healthcare provider.             Medication List      START taking these medications        Additional Info                      alprazolam 0.25 MG tablet   Commonly known as:  XANAX   Quantity:  30 tablet   Refills:  0   Dose:  0.25 mg    Instructions:  Take 1 tablet (0.25 mg total) by mouth 3 (three) times daily as  needed for Anxiety.     Begin Date    AM    Noon    PM    Bedtime       docusate sodium 100 MG capsule   Commonly known as:  COLACE   Quantity:  30 capsule   Refills:  0   Dose:  100 mg    Last time this was given:  100 mg on 4/14/2017  9:01 AM   Instructions:  Take 1 capsule (100 mg total) by mouth 2 (two) times daily as needed for Constipation.     Begin Date    AM    Noon    PM    Bedtime       hydrocodone-acetaminophen 5-325mg 5-325 mg per tablet   Commonly known as:  NORCO   Quantity:  30 tablet   Refills:  0   Dose:  1 tablet    Last time this was given:  1 tablet on 4/14/2017 11:17 AM   Instructions:  Take 1 tablet by mouth every 4 (four) hours as needed.     Begin Date    AM    Noon    PM    Bedtime       ondansetron 8 MG Tbdl   Commonly known as:  ZOFRAN-ODT   Quantity:  10 tablet   Refills:  0   Dose:  8 mg    Last time this was given:  4 mg on 4/13/2017  9:14 AM   Instructions:  Take 1 tablet (8 mg total) by mouth every 8 (eight) hours as needed (Nausea).     Begin Date    AM    Noon    PM    Bedtime         CHANGE how you take these medications        Additional Info                      verapamil 120 MG CR tablet   Commonly known as:  CALAN-SR   Quantity:  30 tablet   Refills:  6   What changed:  See the new instructions.    Last time this was given:  120 mg on 4/13/2017  9:00 PM   Instructions:  TAKE 1/2 TABLET BY MOUTH NIGHTLY     Begin Date    AM    Noon    PM    Bedtime         CONTINUE taking these medications        Additional Info                      allopurinol 100 MG tablet   Commonly known as:  ZYLOPRIM   Quantity:  180 tablet   Refills:  0    Last time this was given:  200 mg on 4/6/2017  8:04 AM   Instructions:  TAKE 2 TABLET BY MOUTH DAILY     Begin Date    AM    Noon    PM    Bedtime       aspirin 81 MG EC tablet   Commonly known as:  ECOTRIN   Refills:  0   Dose:  81 mg    Instructions:  Take 81 mg by mouth once daily.     Begin Date    AM    Noon    PM    Bedtime       baicalin-catechin  500 mg Cap   Quantity:  60 each   Refills:  5   Dose:  500 mg    Instructions:  Take 500 mg by mouth 2 (two) times daily.     Begin Date    AM    Noon    PM    Bedtime       clopidogrel 75 mg tablet   Commonly known as:  PLAVIX   Quantity:  90 tablet   Refills:  3   Dose:  75 mg    Last time this was given:  75 mg on 4/14/2017  9:01 AM   Instructions:  Take 1 tablet (75 mg total) by mouth once daily.     Begin Date    AM    Noon    PM    Bedtime       COLCRYS 0.6 mg tablet   Quantity:  30 tablet   Refills:  3   Generic drug:  colchicine    Instructions:  TAKE 1 TABLET(0.6 MG) BY MOUTH EVERY DAY     Begin Date    AM    Noon    PM    Bedtime       ergocalciferol (vitamin D2) 400 unit Tab   Refills:  0    Instructions:  Take by mouth daily.     Begin Date    AM    Noon    PM    Bedtime       fluticasone 50 mcg/actuation nasal spray   Commonly known as:  FLONASE   Refills:  0   Dose:  2 spray    Instructions:  2 sprays by Each Nare route once daily.     Begin Date    AM    Noon    PM    Bedtime       gabapentin 100 MG capsule   Commonly known as:  NEURONTIN   Refills:  0   Dose:  100 mg    Last time this was given:  100 mg on 4/14/2017  1:26 PM   Instructions:  Take 100 mg by mouth 3 (three) times daily.     Begin Date    AM    Noon    PM    Bedtime       hydrochlorothiazide 12.5 MG Tab   Commonly known as:  HYDRODIURIL   Quantity:  90 tablet   Refills:  1   Comments:  **Patient requests 90 days supply**    Last time this was given:  12.5 mg on 4/6/2017  8:04 AM   Instructions:  TAKE 1 TABLET BY MOUTH ONCE DAILY     Begin Date    AM    Noon    PM    Bedtime       isosorbide mononitrate 30 MG 24 hr tablet   Commonly known as:  IMDUR   Quantity:  30 tablet   Refills:  6    Last time this was given:  30 mg on 4/6/2017  8:03 AM   Instructions:  TAKE 1 TABLET BY MOUTH EVERY DAY     Begin Date    AM    Noon    PM    Bedtime       levothyroxine 75 MCG tablet   Commonly known as:  SYNTHROID   Quantity:  90 tablet   Refills:  1     Last time this was given:  75 mcg on 4/14/2017  5:51 AM   Instructions:  TAKE 1 TABLET(75 MCG) BY MOUTH BEFORE BREAKFAST     Begin Date    AM    Noon    PM    Bedtime       meloxicam 7.5 MG tablet   Commonly known as:  MOBIC   Quantity:  90 tablet   Refills:  0    Instructions:  TAKE 1 TABLET BY MOUTH ONCE DAILY AS NEEDED FOR PAIN     Begin Date    AM    Noon    PM    Bedtime       nitroglycerin 400 mcg/spray   Commonly known as:  NITROMIST   Refills:  0    Instructions:  Place onto the tongue.     Begin Date    AM    Noon    PM    Bedtime       ranitidine 150 MG tablet   Commonly known as:  ZANTAC   Refills:  0   Dose:  150 mg    Instructions:  Take 150 mg by mouth nightly.     Begin Date    AM    Noon    PM    Bedtime       rosuvastatin 10 MG tablet   Commonly known as:  CRESTOR   Quantity:  30 tablet   Refills:  6   Dose:  10 mg    Last time this was given:  10 mg on 4/14/2017  9:01 AM   Instructions:  Take 1 tablet (10 mg total) by mouth once daily.     Begin Date    AM    Noon    PM    Bedtime       sertraline 100 MG tablet   Commonly known as:  ZOLOFT   Quantity:  90 tablet   Refills:  3   Dose:  100 mg   Comments:  **Patient requests 90 days supply**    Last time this was given:  100 mg on 4/14/2017  9:01 AM   Instructions:  Take 1 tablet (100 mg total) by mouth once daily.     Begin Date    AM    Noon    PM    Bedtime       turmeric root extract 500 mg Cap   Refills:  0   Dose:  500 mg    Instructions:  Take 500 mg by mouth 2 (two) times daily.     Begin Date    AM    Noon    PM    Bedtime       ZINC ACETATE ORAL   Refills:  0   Dose:  250 mg    Instructions:  250 mg.     Begin Date    AM    Noon    PM    Bedtime         STOP taking these medications     multivitamin capsule            Where to Get Your Medications      These medications were sent to [facility] - [place], [place] - [address]  [address] , [facility] [address]     Phone:   [phone]     docusate sodium 100 MG capsule    hydrocodone-acetaminophen 5-325mg 5-325 mg per tablet    ondansetron 8 MG Tbdl    verapamil 120 MG CR tablet         You can get these medications from any pharmacy     Bring a paper prescription for each of these medications     alprazolam 0.25 MG tablet                  Please bring to all follow up appointments:    1. A copy of your discharge instructions.  2. All medicines you are currently taking in their original bottles.  3. Identification and insurance card.    Please arrive 15 minutes ahead of scheduled appointment time.    Please call 24 hours in advance if you must reschedule your appointment and/or time.        Your Scheduled Appointments     Apr 26, 2017  9:20 AM CDT   Established Patient Visit with Orlando Moulton MD   [facility] ([facility])    [address] [place] [address]   [phone]            Jun 01, 2017  1:00 PM CDT   Pacemaker Tune Up with [facility], ON ARRHYTHMIA   [facility] Arrhythmia Procedures ([facility])    [address] [place] [address] , 2nd Floor  [facility] [address]   [phone]            Jun 16, 2017  8:20 AM CDT   Established Patient Visit with Jackelin Layton NP   [facility] Internal Medicine ([facility])    [address] [place] [address]   [phone]            Aug 16, 2017 10:00 AM CDT   Established Patient Visit with Dil[facility]ad Rogers MD   [facility]    [address] [facility] [id number] [phone]              Follow-up Information     Follow up with [facility] [place].    Specialty:  Home Health Services    Why:  Home Health:  SN, PT, OT    Contact information:    [address]  [facility] [address]  [phone]          Follow up with Orlando Moulton MD In 2 weeks.    Specialty:  General Surgery    Contact information:    [address] [place] [address] [address] [place]  [phone]          Follow up with [facility]    Specialty:  DME Provider    Why:  DME:  Bedside commode    Contact information:    [address]  [place] [address]  [phone]          Follow up with Marti Coronel MD.    Specialty:  Family Medicine    Contact information:    [address] [place] [address] [address] [place] [place] [address]  [phone]          Follow up with Orlando Moulton MD.    Specialty:  General Surgery    Contact information:    [address] [place] [address] [address] S[place] WIN [address]  [phone]          Discharge Instructions     Future Orders    Activity as tolerated     COMMODE FOR HOME USE     Questions:    Type:  Standard    Height:  5' 4" (1.626 m)    Weight:  86.2 kg (190 lb)    Does patient have medical equipment at home?:  cane, straight    walker, rolling    Length of need (1-99 months):  99    Diet general     Questions:    Total calories:      Fat restriction, if any:      Protein restriction, if any:      Na restriction, if any:      Fluid restriction:      Additional restrictions:          Discharge Instructions         Diverticulosis    Diverticulosis means that small pouches have formed in the wall of your large intestine (colon). Most often, this problem causes no symptoms and is common as people age. But the pouches in the colon are at risk of becoming infected. When this happens, the condition is called diverticulitis. Although most people with diverticulosis never develop diverticulitis, it is still not uncommon. Rectal bleeding can also occur and in less common situations, a type of colon inflammation called colitis.  While most people do not have symptoms, some people with diverticulosis may have:  · Abdominal cramps and pain  · Bloating  · Constipation  · Change in bowel habits  Causes  The exact cause of diverticulosis (and diverticulitis) has not been proved, but a few things are associated with the condition:  · Low-fiber diet  · Constipation  · Lack of " exercise  Your healthcare provider will talk with you about how to manage your condition. Diet changes may be all that are needed to help control diverticulosis and prevent progression to diverticulitis. If you develop diverticulitis, you will likely need other treatments.  Home care  You may be told to take fiber supplements daily. Fiber adds bulk to the stool so that it passes through the colon more easily. Stool softeners may be recommended. You may also be given medications for pain relief. Be sure to take all medications as directed.  In the past, people were told to avoid corn, nuts, and seeds. This is no longer necessary.  Follow these guidelines when caring for yourself at home:  · Eat unprocessed foods that are high in fiber. Whole grains, fruits, and vegetables are good choices.  · Drink 6 to 8 glasses of water every day unless your healthcare provider has you limit how much fluid you should have.  · Watch for changes in your bowel movements. Tell your provider if you notice any changes.  · Begin an exercise program. Ask your provider how to get started. Generally, walking is the best.  · Get plenty of rest and sleep.  Follow-up care  Follow up with your healthcare provider, or as advised. Regular visits may be needed to check on your health. Sometimes special procedures such as colonoscopy, are needed after an episode of diverticulitis or blooding. Be sure to keep all your appointments.  If a stool sample was taken, or cultures were done, you should be told if they are positive, or if your treatment needs to be changed. You can call as directed for the results.  If X-rays were done, a radiologist will look at them. You will be told if there is a change in your treatment.  If antibiotics were prescribed, be sure to finish them all.  When to seek medical advice  Call your healthcare provider right away if any of these occur:  · Fever of 100.4°F (38°C) or higher, or as directed by your healthcare  "provider  · Severe cramps in the lower left side of the abdomen or pain that is getting worse  · Tenderness in the lower left side of the abdomen or worsening pain throughout the abdomen  · Diarrhea or constipation that doesn't get better within 24 hours  · Nausea and vomiting  · Bleeding from the rectum  Call 911  Call emergency services if any of the following occur:  · Trouble breathing  · Confusion  · Very drowsy or trouble awakening  · Fainting or loss of consciousness  · Rapid heart rate  · Chest pain  Date Last Reviewed: 12/30/2015 © 2000-2016 [employer]. [address] [place] [address]. All rights reserved. This information is not intended as a substitute for professional medical care. Always follow your healthcare professional's instructions.          Discharge References/Attachments     ALPRAZOLAM TABLETS (ENGLISH)    ONDANSETRON TABLETS (ENGLISH)    ACETAMINOPHEN; HYDROCODONE TABLETS OR CAPSULES (ENGLISH)    DOCUSATE SODIUM; SENNA TABLETS OR CAPSULES (ENGLISH)    ABDOMINAL INCISION, DISCHARGE INSTRUCTIONS: CARING FOR YOUR (ENGLISH)    INCISION CARE (ENGLISH)    WOUND CARE (ENGLISH)    VERAPAMIL TABLETS (ENGLISH)        Primary Diagnosis     Your primary diagnosis was:  Lower Abdominal Pain      Admission Information     Date & Time Provider Department [facility]    4/5/2017  7:26 AM Nikki Brewer MD [facility] -  [id number]      Care Providers     Provider Role Specialty Primary office phone    Nikki Brewer MD Attending Provider Internal Medicine [phone]    Orlando Moulton MD Consulting Physician  General Surgery [phone]    Orlando Moulton MD Surgeon  General Surgery [phone]    Nikki Brewer MD Team Attending  Internal Medicine [phone]      Your Vitals Were     BP Pulse Temp Resp Height Weight    114/56 66 98.4 °F (36.9 °C) (Oral) 17 5' 4" (1.626 m) 86.2 kg (190 lb)    SpO2 BMI             96% 32.61 kg/m2         Recent Lab Values     No lab " values to display.      Pending Labs     Order Current Status    Specimen to Pathology - Surgery Collected (04/07/17 1307)    Specimen to Pathology - Surgery In process      Allergies as of 4/14/2017        Reactions    Corticosteroids (Glucocorticoids) Nausea Only, Other (See Comments)    Stomach pain, dizziness, headache    Oxycodone Other (See Comments)    Blood pressure dropped      [facility]     [facility] Nurse Care Line - 24/7 Assistance  Unless otherwise directed by your provider, please contact [facility], our nurse care line that is available for 24/7 assistance.     Registered nurses in the [facility] provide clinical advisement, health education, appointment booking, and other advisory services.  Call for this free service at [phone].        Advance Directives     An advance directive is a document which, in the event you are no longer able to make decisions for yourself, tells your healthcare team what kind of treatment you do or do not want to receive, or who you would like to make those decisions for you.  If you do not currently have an advance directive, [facility] encourages you to create one.  For more information call:  [phone]), [phone] ([phone]),  or log on to [email or web address].        Smoking Cessation     If you would like to quit smoking:   You may be eligible for free services if you are a [place] resident and started smoking cigarettes before September 1, 1988.  Call the Smoking Cessation Trust ([facility]) toll free at ([phone] [phone] or ([phone].   Call 7-549-QUIT-NOW if you do not meet the above criteria.   Contact us via email: [email or web address]   View our website for more information: [email or web address]        Language Assistance Services     ATTENTION: Language assistance services are available, free of charge. Please call [phone].      ATENCIÓN: Si brian reyes, yoan a castle disposición  servicios gratuitos de asistencia lingüística. Geovanna al [phone].     [facility] Ý: N?u b?n nói Ti?ng Vi?t, có các d?ch v? h? tr? ngôn ng? mi?n phí dành cho b?n. G?i s? [phone].        [facility] Sign-Up     Activating your [facility] account is as easy as 1-2-3!     1) Visit [email or web address], select Sign Up Now, enter this activation code and your date of birth, then select Next.  Activation code not generated  Current Patient Portal Status: Account disabled      2) Create a username and password to use when you visit [facility] in the future and select a security question in case you lose your password and select Next.    3) Enter your e-mail address and click Sign Up!    Additional Information  If you have questions, please e-mail [email or web address] or call [phone] to talk to our [facility] staff. Remember, [facility] is NOT to be used for urgent needs. For medical emergencies, dial 911.          [facility] complies with applicable Federal civil rights laws and does not discriminate on the basis of race, color, national origin, age, disability, or sex.

## 2017-04-05 NOTE — ED PROVIDER NOTES
SCRIBE #1 NOTE: I, Gildardo Brito, am scribing for, and in the presence of, Mari Soliman DO. I have scribed the entire note.      History      Chief Complaint   Patient presents with    Abdominal Pain     terrible stomach cramps beginning monday with nausea and vomiting        Review of patient's allergies indicates:   Allergen Reactions    Corticosteroids (glucocorticoids) Nausea Only and Other (See Comments)     Stomach pain, dizziness, headache    Oxycodone Other (See Comments)     Blood pressure dropped        HPI   HPI    4/5/2017, 7:36 AM   History obtained from the patient      History of Present Illness: Violet Swenson is a 77 y.o. female patient who presents to the Emergency Department for generalized abd pain described as a cramping sensation which onset gradually 2 nights ago. Symptoms have been constant this AM and moderate in severity. Sx are exacerbated by nothing and relieved by nothing. Associated sxs include N/V and CP which onset gradually last PM. Pt describes the CP as an intermittent pressure in her chest that lasts approximately 10 seconds at a time. Pt also states she is not able to tolerate PO. Pt reports flatulence and states she last had flatulence around 0400 this AM. Patient denies any fever, chills, diarrhea, dysuria, difficulty urinating, constipation, urinary frequency/urgency, hematuria, pelvic pain, SOB, palpitations, weakness/numbness, chest tightness, cough, lightheadedness, dizziness and all other sxs at this time. Pt reports Hx of gastric bypass, CAD, GERD, pacemaker, and HTN. No further complaints or concerns at this time.     Arrival mode: Personal vehicle     PCP: Marti Coronel MD       Past Medical History:  Past Medical History:   Diagnosis Date    Arthritis     Coronary artery disease     Depression     GERD (gastroesophageal reflux disease)     Gout, arthritis     Hyperlipidemia     Hypertension     Hypothyroidism     Lung nodule 2014     "RML--stable    Pneumonia        Past Surgical History:  Past Surgical History:   Procedure Laterality Date    APPENDECTOMY      CARDIAC PACEMAKER PLACEMENT  01/22/2015    CHOLECYSTECTOMY      CORONARY ANGIOPLASTY  02/2014    CORONARY STENT PLACEMENT  02/05/2014    GASTRIC BYPASS      HERNIA REPAIR      TONSILLECTOMY      TOTAL KNEE ARTHROPLASTY Bilateral          Family History:  Family History   Problem Relation Age of Onset    Heart disease Mother     Hypertension Mother     Stomach cancer Father      "ulcers that turned to cancer"    Pancreatic cancer Sister     Leukemia Brother      "leukemia which led to intestinal cancer"       Social History:  Social History     Social History Main Topics    Smoking status: Former Smoker    Smokeless tobacco: Unknown    Alcohol use No    Drug use: No    Sexual activity: Unknown       ROS   Review of Systems   Constitutional: Negative for chills and fever.   HENT: Negative for congestion and sore throat.    Respiratory: Negative for chest tightness and shortness of breath.    Cardiovascular: Positive for chest pain.   Gastrointestinal: Positive for abdominal pain, nausea and vomiting.   Genitourinary: Negative for difficulty urinating and dysuria.   Musculoskeletal: Negative for back pain and neck pain.   Skin: Negative for rash.   Neurological: Negative for dizziness, weakness, light-headedness, numbness and headaches.   Psychiatric/Behavioral: Negative for agitation and confusion.   All other systems reviewed and are negative.      Physical Exam    Initial Vitals   BP Pulse Resp Temp SpO2   04/05/17 0704 04/05/17 0704 04/05/17 0704 04/05/17 0704 04/05/17 0704   161/102 93 16 98.1 °F (36.7 °C) 95 %      Physical Exam  Nursing Notes and Vital Signs Reviewed.  Constitutional: Patient is in no apparent distress. Awake and alert. Well-developed and well-nourished.  Head: Atraumatic. Normocephalic.  Eyes: PERRL. EOM intact. Conjunctivae are not pale. No scleral " "icterus.  ENT: Mucous membranes are moist. Oropharynx is clear and symmetric.    Neck: Supple. Full ROM. No lymphadenopathy.  Cardiovascular: Regular rate. Regular rhythm. No murmurs, rubs, or gallops. Distal pulses are 2+ and symmetric.  Pulmonary/Chest: No respiratory distress. Clear to auscultation bilaterally. No wheezing, rales, or rhonchi.  Abdominal: Soft and non-distended.  There is generalized abdominal discomfort. No rebound, guarding, or rigidity. Hypoactive bowel sounds.  Musculoskeletal: Moves all extremities. No obvious deformities. No edema. No calf tenderness.  Skin: Warm and dry.  Neurological:  Alert, awake, and appropriate.  Normal speech.  No acute focal neurological deficits are appreciated.  Psychiatric: Normal affect. Good eye contact. Appropriate in content.    ED Course    Procedures  ED Vital Signs:  Vitals:    04/05/17 0704 04/05/17 0841 04/05/17 0947 04/05/17 1109   BP: (!) 161/102  (!) 175/91 (!) 153/82   Pulse: 93 93 81 81   Resp: 16  11 15   Temp: 98.1 °F (36.7 °C)      TempSrc: Oral      SpO2: 95%  95% 97%   Weight: 82.6 kg (182 lb)      Height: 5' 4" (1.626 m)       04/05/17 1132 04/05/17 1202 04/05/17 1217 04/05/17 1318   BP: (!) 154/92 (!) 163/85 (!) 155/73 120/75   Pulse: 79 79 75 76   Resp: 13 11 17 20   Temp:       TempSrc:       SpO2: 98% 96% 98% 95%   Weight:       Height:           Abnormal Lab Results:  Labs Reviewed   CBC W/ AUTO DIFFERENTIAL - Abnormal; Notable for the following:        Result Value    WBC 13.25 (*)     RDW 16.5 (*)     Gran # 9.0 (*)     All other components within normal limits   COMPREHENSIVE METABOLIC PANEL - Abnormal; Notable for the following:     Glucose 119 (*)     Total Protein 8.6 (*)     eGFR if  56 (*)     eGFR if non  49 (*)     All other components within normal limits   LIPASE - Abnormal; Notable for the following:     Lipase 351 (*)     All other components within normal limits   AMYLASE - Abnormal; Notable " for the following:     Amylase 189 (*)     All other components within normal limits   TROPONIN I - Abnormal; Notable for the following:     Troponin I 0.179 (*)     All other components within normal limits   URINALYSIS - Abnormal; Notable for the following:     Protein, UA 1+ (*)     Ketones, UA Trace (*)     Bilirubin (UA) 1+ (*)     Occult Blood UA 1+ (*)     All other components within normal limits   URINALYSIS MICROSCOPIC - Abnormal; Notable for the following:     RBC, UA 5 (*)     All other components within normal limits   LACTIC ACID, PLASMA   CK   PROTIME-INR   APTT        All Lab Results:  Results for orders placed or performed during the hospital encounter of 04/05/17   CBC auto differential   Result Value Ref Range    WBC 13.25 (H) 3.90 - 12.70 K/uL    RBC 4.57 4.00 - 5.40 M/uL    Hemoglobin 13.0 12.0 - 16.0 g/dL    Hematocrit 40.4 37.0 - 48.5 %    MCV 88 82 - 98 fL    MCH 28.4 27.0 - 31.0 pg    MCHC 32.2 32.0 - 36.0 %    RDW 16.5 (H) 11.5 - 14.5 %    Platelets 334 150 - 350 K/uL    MPV 10.1 9.2 - 12.9 fL    Gran # 9.0 (H) 1.8 - 7.7 K/uL    Lymph # 3.4 1.0 - 4.8 K/uL    Mono # 0.8 0.3 - 1.0 K/uL    Eos # 0.0 0.0 - 0.5 K/uL    Baso # 0.02 0.00 - 0.20 K/uL    Gran% 67.7 38.0 - 73.0 %    Lymph% 25.6 18.0 - 48.0 %    Mono% 6.3 4.0 - 15.0 %    Eosinophil% 0.2 0.0 - 8.0 %    Basophil% 0.2 0.0 - 1.9 %    Differential Method Automated    Comprehensive metabolic panel   Result Value Ref Range    Sodium 137 136 - 145 mmol/L    Potassium 4.6 3.5 - 5.1 mmol/L    Chloride 98 95 - 110 mmol/L    CO2 24 23 - 29 mmol/L    Glucose 119 (H) 70 - 110 mg/dL    BUN, Bld 17 8 - 23 mg/dL    Creatinine 1.1 0.5 - 1.4 mg/dL    Calcium 10.0 8.7 - 10.5 mg/dL    Total Protein 8.6 (H) 6.0 - 8.4 g/dL    Albumin 4.2 3.5 - 5.2 g/dL    Total Bilirubin 0.4 0.1 - 1.0 mg/dL    Alkaline Phosphatase 81 55 - 135 U/L    AST 37 10 - 40 U/L    ALT 22 10 - 44 U/L    Anion Gap 15 8 - 16 mmol/L    eGFR if African American 56 (A) >60 mL/min/1.73 m^2     eGFR if non African American 49 (A) >60 mL/min/1.73 m^2   Lipase   Result Value Ref Range    Lipase 351 (H) 4 - 60 U/L   Lactic acid, plasma   Result Value Ref Range    Lactate (Lactic Acid) 1.5 0.5 - 2.2 mmol/L   Amylase   Result Value Ref Range    Amylase 189 (H) 20 - 110 U/L   CPK   Result Value Ref Range     20 - 180 U/L   Troponin I   Result Value Ref Range    Troponin I 0.179 (H) 0.000 - 0.026 ng/mL   Urinalysis   Result Value Ref Range    Specimen UA Urine, Clean Catch     Color, UA Yellow Yellow, Straw, Lupe    Appearance, UA Clear Clear    pH, UA 6.0 5.0 - 8.0    Specific Gravity, UA 1.025 1.005 - 1.030    Protein, UA 1+ (A) Negative    Glucose, UA Negative Negative    Ketones, UA Trace (A) Negative    Bilirubin (UA) 1+ (A) Negative    Occult Blood UA 1+ (A) Negative    Nitrite, UA Negative Negative    Urobilinogen, UA 1.0 <2.0 EU/dL    Leukocytes, UA Negative Negative   Protime-INR   Result Value Ref Range    Prothrombin Time 10.0 9.0 - 12.5 sec    INR 1.0 0.8 - 1.2   APTT   Result Value Ref Range    aPTT 26.3 21.0 - 32.0 sec   Urinalysis Microscopic   Result Value Ref Range    RBC, UA 5 (H) 0 - 4 /hpf    WBC, UA 4 0 - 5 /hpf    Bacteria, UA Rare None-Occ /hpf    Squam Epithel, UA 3 /hpf    Hyaline Casts, UA 0 0-1/lpf /lpf    Microscopic Comment SEE COMMENT          Imaging Results:  Imaging Results         CT Abdomen Pelvis With Contrast (Final result) Result time:  04/05/17 11:18:03    Final result by Elieser Enriquez MD (04/05/17 11:18:03)    Impression:             6.9 x 7.0 x 8.4 cm soft tissue mass in the right lower quadrant centered at the terminal ileum abutting the cecum.  Adjacent conglomerate adenopathy in the right lower quadrant mesentery.  Differential considerations include small bowel adenocarcinoma, exophytic colon cancer, gastrointestinal stromal tumor, or lymphoma.  Recommend endoscopy with biopsy.  There is no evidence of bowel obstruction or perforation.    Mild mucosal edema  and mucosal hyperenhancement involving the gastric remnant.  Correlate with signs and symptoms of gastritis.    1.6 cm indeterminate left adrenal adenoma.  Metastasis not excluded.        Multifocal renal cortical thinning and cortical scarring.  All CT scans at this facility use dose modulation, iterative reconstruction and/or weight based dosing when appropriate to reduce radiation dose to as low as reasonably achievable.       Electronically signed by: ALONZO CHA MD  Date:     04/05/17  Time:    11:18     Narrative:    Exam: CT ABDOMEN PELVIS WITH CONTRAST    Technique: Axial CT imaging was performed through the abdomen and pelvis with  75cc  of intravenous contrast. Multiplanar reformats were performed and interpreted.    Clinical History:  Abdominal pain.  generalized abdominal pain      Comparison:  None     Findings:          Lung bases are clear.    The liver is normal in size and surface contour.  No focal hepatic lesions.  The gallbladder has been removed.  No biliary ductal dilatation.  The pancreas, spleen, and right adrenal gland are within normal limits.  There is a 1.6 cm left adrenal nodule.  Marked lobulation and cortical thinning of both kidneys.  Multifocal renal cortical scarring.  No hydronephrosis.     The patient is status post gastric bypass.  There is submucosal edema and mucosal hyperenhancement of the excluded gastric remnant.  No evidence of bowel obstruction.    In the right lower quadrant, there is a lobular soft tissue mass that measures 6.9 x 7.0 x 8.4 cm that abuts the mesenteric surface of the cecum centered at the terminal ileum.  Multiple enlarged adjacent ileocolic lymph nodes.  The conglomerate mona mass measures up to 4.1 x 2.9 cm.  Colonic diverticulosis.    Aortic atherosclerosis without evidence of aneurysm.     The urinary bladder is unremarkable. The uterus is unremarkable.  No free pelvic fluid.    No significant osseous abnormality is identified.  No suspicious  osseous lesions.            X-Ray Chest AP Portable (Final result) Result time:  04/05/17 08:36:07    Final result by TASHA Kearns Sr., MD (04/05/17 08:36:07)    Impression:        1.  The lungs are clear.  2.  Surgical changes      Electronically signed by: TASHA KEARNS MD  Date:     04/05/17  Time:    08:36     Narrative:    One-view chest x-ray    Clinical History: Chest pain    Finding: A cardiac pacemaker is in place.  The leads appear to be intact.  The size of the heart is normal. The lungs are clear. There is no pneumothorax.  The costophrenic angles are sharp.  Surgical clips are projected over the left upper quadrant of the abdomen.               The EKG was ordered, reviewed, and independently interpreted by the ED provider.  Interpretation time: 0805  Rate: 84 BPM  Rhythm: Atrial-sensed ventricular -paced rhythm   Interpretation: No STEMI.         The Emergency Provider reviewed the vital signs and test results, which are outlined above.    ED Discussion     8:39 AM: Re-evaluated pt. Pt is resting comfortably and is in no acute distress.  Pt states her pain is currently rated 5/10.  D/w pt all pertinent results. D/w pt any concerns expressed at this time. Answered all questions. Pt expresses understanding at this time.    11:31 AM: Dr. Soliman discussed the pt's case with Dr. Krishnamurthy (Cardiology) who recommends serial enzymes.    11:55 AM: Discussed case with Reba (Hospital Medicine). Dr. Joshi agrees with current care and management of pt and accepts admission.   Admitting Service: Hospital medicine   Admitting Physician: Dr. Joshi  Admit to: Tele    12:12 AM: Re-evaluated pt. I have discussed test results, shared treatment plan, and the need for admission with patient and family at bedside. Pt and family express understanding at this time and agree with all information. All questions answered. Pt and family have no further questions or concerns at this time. Pt is ready for admit.    1:42 PM:  Dr. Soliman discussed the pt's case with Dr. Krishnamurthy (Cardiology) who states this is 50/50 regarding ACS, recommended serial enzymes and an echo.    ED Medication(s):  Medications   heparin (porcine) injection 5,000 Units (not administered)   pantoprazole injection 40 mg (40 mg Intravenous Given 4/5/17 1353)   0.9%  NaCl infusion ( Intravenous New Bag 4/5/17 1353)   bisacodyl EC tablet 20 mg (not administered)   polyethylene glycol packet 238 g (not administered)   sodium chloride 0.9% bolus 1,000 mL (0 mLs Intravenous Stopped 4/5/17 1119)   ondansetron injection 4 mg (4 mg Intravenous Given 4/5/17 0811)   morphine injection 2 mg (2 mg Intravenous Given 4/5/17 0811)   pantoprazole injection 40 mg (40 mg Intravenous Given 4/5/17 0811)   omnipaque 350 iohexol 30 mL (30 mLs Oral Given 4/5/17 0825)   morphine injection 2 mg (2 mg Intravenous Given 4/5/17 0848)   aspirin chewable tablet 81 mg (81 mg Oral Given 4/5/17 1030)   omnipaque 350 iohexol 75 mL (75 mLs Intravenous Given 4/5/17 1100)   morphine injection 2 mg (2 mg Intravenous Given 4/5/17 1155)   morphine injection 2 mg (2 mg Intravenous Given 4/5/17 1355)       Current Discharge Medication List                Medical Decision Making    Medical Decision Making:   Clinical Tests:   Lab Tests: Reviewed and Ordered  Radiological Study: Ordered and Reviewed           Scribe Attestation:   Scribe #1: I performed the above scribed service and the documentation accurately describes the services I performed. I attest to the accuracy of the note.    Attending:   Physician Attestation Statement for Scribe #1: I, Mari Soliman, DO, personally performed the services described in this documentation, as scribed by Gildardo Brito, in my presence, and it is both accurate and complete.          Clinical Impression       ICD-10-CM ICD-9-CM   1. Chest pain R07.9 786.50   2. Elevated amylase and lipase R74.8 790.5   3. Abdominal mass, RLQ (right lower quadrant) R19.03 789.33    4. Elevated troponin R74.8 790.6   5. Non-intractable vomiting with nausea, unspecified vomiting type R11.2 787.01   6. Essential hypertension I10 401.9   7. Renal insufficiency N28.9 593.9       Disposition:   Disposition: Admitted (Tele)  Condition: Mera Soliman,   04/05/17 1550

## 2017-04-05 NOTE — SUBJECTIVE & OBJECTIVE
Past Medical History:   Diagnosis Date    Arthritis     Coronary artery disease     Depression     GERD (gastroesophageal reflux disease)     Gout, arthritis     Hyperlipidemia     Hypertension     Hypothyroidism     Lung nodule 2014    RML--stable    Pneumonia        Past Surgical History:   Procedure Laterality Date    APPENDECTOMY      CARDIAC PACEMAKER PLACEMENT  01/22/2015    CHOLECYSTECTOMY      CORONARY ANGIOPLASTY  02/2014    CORONARY STENT PLACEMENT  02/05/2014    GASTRIC BYPASS      HERNIA REPAIR      TONSILLECTOMY      TOTAL KNEE ARTHROPLASTY Bilateral        Review of patient's allergies indicates:   Allergen Reactions    Corticosteroids (glucocorticoids) Nausea Only and Other (See Comments)     Stomach pain, dizziness, headache    Oxycodone Other (See Comments)     Blood pressure dropped     Family History     Problem Relation (Age of Onset)    Heart disease Mother    Hypertension Mother    Pancreatic cancer Sister    Stomach cancer Father, Brother        Social History Main Topics    Smoking status: Former Smoker    Smokeless tobacco: Not on file    Alcohol use No    Drug use: No    Sexual activity: Not on file     Review of Systems   Constitutional: Negative for fever.   HENT: Negative for hearing loss.    Eyes: Negative for visual disturbance.   Respiratory: Negative for cough and shortness of breath.    Cardiovascular: Positive for chest pain (CP last n ight which has resolved.).   Gastrointestinal:        As per HPI.   Genitourinary: Negative for dysuria, frequency and hematuria.   Musculoskeletal: Negative for arthralgias and back pain.   Skin: Negative for rash.   Neurological: Negative for seizures, syncope, numbness and headaches.   Hematological: Does not bruise/bleed easily.   Psychiatric/Behavioral: The patient is not nervous/anxious.      Objective:     Vital Signs (Most Recent):  Temp: 98.1 °F (36.7 °C) (04/05/17 0704)  Pulse: 76 (04/05/17 1318)  Resp: 20  (04/05/17 1318)  BP: 120/75 (04/05/17 1318)  SpO2: 95 % (04/05/17 1318) Vital Signs (24h Range):  Temp:  [98.1 °F (36.7 °C)] 98.1 °F (36.7 °C)  Pulse:  [75-93] 76  Resp:  [11-20] 20  SpO2:  [95 %-98 %] 95 %  BP: (120-175)/() 120/75     Weight: 82.6 kg (182 lb) (04/05/17 0704)  Body mass index is 31.24 kg/(m^2).    No intake or output data in the 24 hours ending 04/05/17 1426    Lines/Drains/Airways     Peripheral Intravenous Line                 Peripheral IV - Single Lumen 04/05/17 0814 Left Antecubital less than 1 day                Physical Exam   Constitutional: She is oriented to person, place, and time. Vital signs are normal. She appears well-developed and well-nourished.   HENT:   Head: Normocephalic and atraumatic.   Eyes: EOM are normal.   Neck: Normal range of motion. Neck supple. Carotid bruit is not present.   Cardiovascular: Normal rate and regular rhythm.    No murmur heard.  Pulmonary/Chest: Effort normal and breath sounds normal. No respiratory distress. She has no wheezes.   Abdominal: Soft. Normal appearance and bowel sounds are normal. She exhibits no distension and no mass. There is tenderness (with deep palpation) in the suprapubic area.   Musculoskeletal: She exhibits no edema.   Neurological: She is alert and oriented to person, place, and time. No cranial nerve deficit.   Skin: Skin is warm and dry. No rash noted.   Psychiatric: Her behavior is normal.       Significant Labs:  CBC:   Recent Labs  Lab 04/05/17  0807   WBC 13.25*   HGB 13.0   HCT 40.4        CMP:   Recent Labs  Lab 04/05/17  0807   *   CALCIUM 10.0   ALBUMIN 4.2   PROT 8.6*      K 4.6   CO2 24   CL 98   BUN 17   CREATININE 1.1   ALKPHOS 81   ALT 22   AST 37   BILITOT 0.4       Significant Imaging:  Imaging results within the past 24 hours have been reviewed.

## 2017-04-05 NOTE — ASSESSMENT & PLAN NOTE
- Admit to Inpatient  - CT abdomen/pelvis revealed:  Soft tissue mass in the RLQ. Recommend endoscopy with biopsy.   - Inpatient consult to Gastroenterology - Plan for colonoscopy in AM  - Bowel prep with GoLytely  - NPO after midnight  - Analgesics/Antiemetic prn

## 2017-04-05 NOTE — CONSULTS
[facility] -   Gastroenterology  Consult Note    Patient Name: Violet Swenson  MRN: [id number]  Admission Date: 4/5/2017  Hospital Length of Stay: 0 days  Code Status: Full Code   Attending Provider: Kellee Joshi MD   Consulting Provider: Radha Burnham PA-C  Primary Care Physician: Marti Coronel MD  Principal Problem:<principal problem not specified>    Inpatient consult to Gastroenterology  Consult performed by: RADHA BURNHAM  Consult ordered by: THELMA GAMBLE  Reason for consult: Abnormal CT scan; abdominal mass        Subjective:     HPI:  The patient presented to the ER for abdominal pain. The pain started two days ago. She describes it as lower abdominal cramps. Associated with nausea, vomiting and decreased appetite. She had a gastric bypass many years ago. She says since then she has been having intermittent nausea, vomiting and abdomina pain. These episodes usually lasts a day, but this one lasted longer. About three months ago, she noticed she had lost almost 30 pounds without a change in diet. She says her weight has stabilized since then. She has alternating bowel habits which is her baseline. No change in bowel habits. She denies hematochezia or melena. She has never had a colonoscopy. She had an EGD many years ago. Labs are unremarkable. CT scan with contrast showed a mass near the cecum/terminal ileum without obstruction.     Past Medical History:   Diagnosis Date    Arthritis     Coronary artery disease     Depression     GERD (gastroesophageal reflux disease)     Gout, arthritis     Hyperlipidemia     Hypertension     Hypothyroidism     Lung nodule 2014    RML--stable    Pneumonia        Past Surgical History:   Procedure Laterality Date    APPENDECTOMY      CARDIAC PACEMAKER PLACEMENT  01/22/2015    CHOLECYSTECTOMY      CORONARY ANGIOPLASTY  02/2014    CORONARY STENT PLACEMENT  02/05/2014    GASTRIC BYPASS      HERNIA REPAIR      TONSILLECTOMY       TOTAL KNEE ARTHROPLASTY Bilateral        Review of patient's allergies indicates:   Allergen Reactions    Corticosteroids (glucocorticoids) Nausea Only and Other (See Comments)     Stomach pain, dizziness, headache    Oxycodone Other (See Comments)     Blood pressure dropped     Family History     Problem Relation (Age of Onset)    Heart disease Mother    Hypertension Mother    Pancreatic cancer Sister    Stomach cancer Father, Brother        Social History Main Topics    Smoking status: Former Smoker    Smokeless tobacco: Not on file    Alcohol use No    Drug use: No    Sexual activity: Not on file     Review of Systems   Constitutional: Negative for fever.   HENT: Negative for hearing loss.    Eyes: Negative for visual disturbance.   Respiratory: Negative for cough and shortness of breath.    Cardiovascular: Positive for chest pain (CP last n ight which has resolved.).   Gastrointestinal:        As per HPI.   Genitourinary: Negative for dysuria, frequency and hematuria.   Musculoskeletal: Negative for arthralgias and back pain.   Skin: Negative for rash.   Neurological: Negative for seizures, syncope, numbness and headaches.   Hematological: Does not bruise/bleed easily.   Psychiatric/Behavioral: The patient is not nervous/anxious.      Objective:     Vital Signs (Most Recent):  Temp: 98.1 °F (36.7 °C) (04/05/17 0704)  Pulse: 76 (04/05/17 1318)  Resp: 20 (04/05/17 1318)  BP: 120/75 (04/05/17 1318)  SpO2: 95 % (04/05/17 1318) Vital Signs (24h Range):  Temp:  [98.1 °F (36.7 °C)] 98.1 °F (36.7 °C)  Pulse:  [75-93] 76  Resp:  [11-20] 20  SpO2:  [95 %-98 %] 95 %  BP: (120-175)/() 120/75     Weight: 82.6 kg (182 lb) (04/05/17 0704)  Body mass index is 31.24 kg/(m^2).    No intake or output data in the 24 hours ending 04/05/17 1426    Lines/Drains/Airways     Peripheral Intravenous Line                 Peripheral IV - Single Lumen 04/05/17 0897 Left Antecubital less than 1 day                Physical  Exam   Constitutional: She is oriented to person, place, and time. Vital signs are normal. She appears well-developed and well-nourished.   HENT:   Head: Normocephalic and atraumatic.   Eyes: EOM are normal.   Neck: Normal range of motion. Neck supple. Carotid bruit is not present.   Cardiovascular: Normal rate and regular rhythm.    No murmur heard.  Pulmonary/Chest: Effort normal and breath sounds normal. No respiratory distress. She has no wheezes.   Abdominal: Soft. Normal appearance and bowel sounds are normal. She exhibits no distension and no mass. There is tenderness (with deep palpation) in the suprapubic area.   Musculoskeletal: She exhibits no edema.   Neurological: She is alert and oriented to person, place, and time. No cranial nerve deficit.   Skin: Skin is warm and dry. No rash noted.   Psychiatric: Her behavior is normal.       Significant Labs:  CBC:   Recent Labs  Lab 04/05/17  0807   WBC 13.25*   HGB 13.0   HCT 40.4        CMP:   Recent Labs  Lab 04/05/17  0807   *   CALCIUM 10.0   ALBUMIN 4.2   PROT 8.6*      K 4.6   CO2 24   CL 98   BUN 17   CREATININE 1.1   ALKPHOS 81   ALT 22   AST 37   BILITOT 0.4       Significant Imaging:  Imaging results within the past 24 hours have been reviewed.    Assessment/Plan:     Lower abdominal pain  76 yo female with lower abdominal pain, etiology unclear, but noted to have RLQ abdominal mass on CT scan without obstruction. Continue supportive care. Consider colonoscopy.     Abnormal CT scan, gastrointestinal tract  CT scan showed a RLQ mass. Reviewed CT scan with Dr. Jeansonne to help determine if this mass was insider the intestinal tract and reachable by colonoscopy vs outside the tract. We felt it was undetermined, but if in the tract, reachable by scope. Therefore, a colonoscopy would be our recommendation. This was discussed with the patient.     CAD, multiple vessel  The patient reported chest pain which resolved. It was in relation to  the other GI symptoms. EKG unremarkable. Troponin elevated. Repeat labs pending.       Thank you for your consult. I will follow-up with patient. Please contact us if you have any additional questions.    Franco Burnham PA-C  Gastroenterology  [facility]

## 2017-04-05 NOTE — CONSULTS
[facility]  Cardiology  Consult Note    Patient Name: Violet Swenson  MRN: [id number]  Admission Date: 4/5/2017  Hospital Length of Stay: 0 days  Code Status: No Order   Attending Provider: Mair Gamble DO   Consulting Provider: Floridalma Krishnamurthy MD  Primary Care Physician: Marti Coronel MD  Principal Problem:<principal problem not specified>    Patient information was obtained from patient and ER records.     Inpatient consult to Cardiology  Consult performed by: FLORIDALMA KRISHNAMURTHY  Consult ordered by: MARI GAMBLE        Subjective:     Chief Complaint:  Chest pain.     HPI: 78 yo female, f/u at cardiology clinic.  PMH CAD s/p LAD stent in 2010, s/p PPM, HTN, HLD s/p gastric bypass in 2009 and almost weekly abd cramp and n/v. And strong family history of GI tumors.  Started abd cramp and N/V 4 days ago constantly. One day ago, started chest tightness, lasting for few minutes. No radiation. Came to ER due to severe N/V and abd pain.  In ER, EKG showed V paced and troponin 0.179.  abd CT showed mass in the right lower quadrant centered at the terminal ileum abutting the cecum.  Now chest pain free      Past Medical History:   Diagnosis Date    Arthritis     Coronary artery disease     Depression     GERD (gastroesophageal reflux disease)     Gout, arthritis     Hyperlipidemia     Hypertension     Hypothyroidism     Lung nodule 2014    RML--stable    Pneumonia        Past Surgical History:   Procedure Laterality Date    APPENDECTOMY      CARDIAC PACEMAKER PLACEMENT  01/22/2015    CHOLECYSTECTOMY      CORONARY ANGIOPLASTY  02/2014    CORONARY STENT PLACEMENT  02/05/2014    GASTRIC BYPASS      HERNIA REPAIR      TONSILLECTOMY      TOTAL KNEE ARTHROPLASTY Bilateral        Review of patient's allergies indicates:   Allergen Reactions    Corticosteroids (glucocorticoids) Nausea Only and Other (See Comments)     Stomach pain, dizziness, headache    Oxycodone Other (See  Comments)     Blood pressure dropped       No current facility-administered medications on file prior to encounter.      Current Outpatient Prescriptions on File Prior to Encounter   Medication Sig    allopurinol (ZYLOPRIM) 100 MG tablet TAKE 2 TABLET BY MOUTH DAILY    aspirin (ECOTRIN) 81 MG EC tablet Take 81 mg by mouth once daily.     baicalin-catechin 500 mg Cap Take 500 mg by mouth 2 (two) times daily.    clopidogrel (PLAVIX) 75 mg tablet Take 1 tablet (75 mg total) by mouth once daily.    ergocalciferol, vitamin D2, 400 unit Tab Take by mouth daily.    fluticasone (FLONASE) 50 mcg/actuation nasal spray 2 sprays by Each Nare route once daily.    gabapentin (NEURONTIN) 100 MG capsule Take 100 mg by mouth 3 (three) times daily.    hydrochlorothiazide (HYDRODIURIL) 12.5 MG Tab TAKE 1 TABLET BY MOUTH ONCE DAILY    isosorbide mononitrate (IMDUR) 30 MG 24 hr tablet TAKE 1 TABLET BY MOUTH EVERY DAY    levothyroxine (SYNTHROID) 75 MCG tablet TAKE 1 TABLET(75 MCG) BY MOUTH BEFORE BREAKFAST    meloxicam (MOBIC) 7.5 MG tablet TAKE 1 TABLET BY MOUTH ONCE DAILY AS NEEDED FOR PAIN    multivitamin capsule Take 1 capsule by mouth once daily.    nitroglycerin 400 mcg/spray SprA Place onto the tongue.    ranitidine (ZANTAC) 150 MG tablet Take 150 mg by mouth nightly.    rosuvastatin (CRESTOR) 10 MG tablet Take 1 tablet (10 mg total) by mouth once daily.    sertraline (ZOLOFT) 100 MG tablet Take 1 tablet (100 mg total) by mouth once daily.    turmeric root extract 500 mg Cap Take 500 mg by mouth 2 (two) times daily.    verapamil (CALAN-SR) 120 MG CR tablet Take 1 tablet (120 mg total) by mouth nightly. One-half tablet by mouth nightly    ZINC ACETATE ORAL 250 mg.    COLCRYS 0.6 mg tablet TAKE 1 TABLET(0.6 MG) BY MOUTH EVERY DAY     Family History     Problem Relation (Age of Onset)    Heart disease Mother    Hypertension Mother    Pancreatic cancer Sister    Stomach cancer Father, Brother        Social History  Main Topics    Smoking status: Former Smoker    Smokeless tobacco: Not on file    Alcohol use No    Drug use: No    Sexual activity: Not on file     Review of Systems   Constitution: Negative for decreased appetite, diaphoresis, fever, weakness, malaise/fatigue and night sweats.   HENT: Negative for headaches and nosebleeds.    Eyes: Negative for blurred vision and double vision.   Cardiovascular: Positive for chest pain. Negative for claudication, dyspnea on exertion, irregular heartbeat, leg swelling, near-syncope, orthopnea, palpitations, paroxysmal nocturnal dyspnea and syncope.   Respiratory: Negative for cough, shortness of breath, sleep disturbances due to breathing, snoring, sputum production and wheezing.    Endocrine: Negative for cold intolerance and polyuria.   Hematologic/Lymphatic: Does not bruise/bleed easily.   Skin: Negative for rash.   Musculoskeletal: Negative for back pain, falls, joint pain, joint swelling and neck pain.   Gastrointestinal: Positive for abdominal pain, nausea and vomiting. Negative for heartburn.   Genitourinary: Negative for dysuria, frequency and hematuria.   Neurological: Negative for difficulty with concentration, dizziness, focal weakness, light-headedness, numbness and seizures.   Psychiatric/Behavioral: Negative for depression, memory loss and substance abuse. The patient does not have insomnia.    Allergic/Immunologic: Negative for HIV exposure and hives.     Objective:     Vital Signs (Most Recent):  Temp: 98.1 °F (36.7 °C) (04/05/17 0704)  Pulse: 75 (04/05/17 1217)  Resp: 17 (04/05/17 1217)  BP: (!) 155/73 (04/05/17 1217)  SpO2: 98 % (04/05/17 1217) Vital Signs (24h Range):  Temp:  [98.1 °F (36.7 °C)] 98.1 °F (36.7 °C)  Pulse:  [75-93] 75  Resp:  [11-17] 17  SpO2:  [95 %-98 %] 98 %  BP: (153-175)/() 155/73     Weight: 82.6 kg (182 lb)  Body mass index is 31.24 kg/(m^2).    SpO2: 98 %  O2 Device (Oxygen Therapy): room air    No intake or output data in the 24  hours ending 04/05/17 1344    Lines/Drains/Airways     Peripheral Intravenous Line                 Peripheral IV - Single Lumen 04/05/17 0814 Left Antecubital less than 1 day                Physical Exam   Constitutional: She is oriented to person, place, and time. She appears well-nourished.   HENT:   Head: Normocephalic.   Eyes: Pupils are equal, round, and reactive to light.   Neck: Normal carotid pulses and no JVD present. Carotid bruit is not present. No thyromegaly present.   Cardiovascular: Normal rate, regular rhythm and normal pulses.   No extrasystoles are present. PMI is not displaced.  Exam reveals no gallop and no S3.    Murmur heard.  SM on bases   Pulmonary/Chest: Breath sounds normal. No stridor. No respiratory distress.   Abdominal: Soft. Bowel sounds are normal. There is no tenderness. There is no rebound.   Musculoskeletal: Normal range of motion.   Neurological: She is alert and oriented to person, place, and time.   Skin: Skin is intact. No rash noted.   Psychiatric: Her behavior is normal.       Significant Labs:   CMP   Recent Labs  Lab 04/05/17  0807      K 4.6   CL 98   CO2 24   *   BUN 17   CREATININE 1.1   CALCIUM 10.0   PROT 8.6*   ALBUMIN 4.2   BILITOT 0.4   ALKPHOS 81   AST 37   ALT 22   ANIONGAP 15   ESTGFRAFRICA 56*   EGFRNONAA 49*   , CBC   Recent Labs  Lab 04/05/17  0807   WBC 13.25*   HGB 13.0   HCT 40.4      , Lipid Panel No results for input(s): CHOL, HDL, LDLCALC, TRIG, CHOLHDL in the last 48 hours. and Troponin   Recent Labs  Lab 04/05/17  0807   TROPONINI 0.179*       Significant Imaging: CT scan: CT ABDOMEN PELVIS WITH CONTRAST:   Results for orders placed or performed during the hospital encounter of 04/05/17   CT Abdomen Pelvis With Contrast    Narrative    Exam: CT ABDOMEN PELVIS WITH CONTRAST    Technique: Axial CT imaging was performed through the abdomen and pelvis with  75cc  of intravenous contrast. Multiplanar reformats were performed and  interpreted.    Clinical History:  Abdominal pain.  generalized abdominal pain      Comparison:  None     Findings:          Lung bases are clear.    The liver is normal in size and surface contour.  No focal hepatic lesions.  The gallbladder has been removed.  No biliary ductal dilatation.  The pancreas, spleen, and right adrenal gland are within normal limits.  There is a 1.6 cm left adrenal nodule.  Marked lobulation and cortical thinning of both kidneys.  Multifocal renal cortical scarring.  No hydronephrosis.     The patient is status post gastric bypass.  There is submucosal edema and mucosal hyperenhancement of the excluded gastric remnant.  No evidence of bowel obstruction.    In the right lower quadrant, there is a lobular soft tissue mass that measures 6.9 x 7.0 x 8.4 cm that abuts the mesenteric surface of the cecum centered at the terminal ileum.  Multiple enlarged adjacent ileocolic lymph nodes.  The conglomerate mona mass measures up to 4.1 x 2.9 cm.  Colonic diverticulosis.    Aortic atherosclerosis without evidence of aneurysm.     The urinary bladder is unremarkable. The uterus is unremarkable.  No free pelvic fluid.    No significant osseous abnormality is identified.  No suspicious osseous lesions.    Impression             6.9 x 7.0 x 8.4 cm soft tissue mass in the right lower quadrant centered at the terminal ileum abutting the cecum.  Adjacent conglomerate adenopathy in the right lower quadrant mesentery.  Differential considerations include small bowel adenocarcinoma, exophytic colon cancer, gastrointestinal stromal tumor, or lymphoma.  Recommend endoscopy with biopsy.  There is no evidence of bowel obstruction or perforation.    Mild mucosal edema and mucosal hyperenhancement involving the gastric remnant.  Correlate with signs and symptoms of gastritis.    1.6 cm indeterminate left adrenal adenoma.  Metastasis not excluded.        Multifocal renal cortical thinning and cortical  scarring.  All CT scans at this facility use dose modulation, iterative reconstruction and/or weight based dosing when appropriate to reduce radiation dose to as low as reasonably achievable.       Electronically signed by: ALONZO CHA MD  Date:     04/05/17  Time:    11:18     and X-Ray: CXR: X-Ray Chest PA and Lateral (CXR): No results found for this visit on 04/05/17.  Assessment and Plan:     Elevated troponin. Demanding ischemia vs NSTEMI/ACS  Chest tightness  abd mass  CAD/sp PCI and PPM  H/O gastric bypass  Strong f/h GI malignancy    Plan:  Repeat troponin and ECHO for risk stratification.  GI/surgery consult for Abd mass.  OK to hold ASA and Plavix if indicated  Will f/u    Active Diagnoses:    Diagnosis Date Noted POA    Chest pain [R07.9] 04/05/2017 Yes      Problems Resolved During this Admission:    Diagnosis Date Noted Date Resolved POA       VTE Risk Mitigation         Ordered     heparin (porcine) injection 5,000 Units  Every 8 hours     Route:  Subcutaneous        04/05/17 1231          Thank you for your consult. I will follow-up with patient. Please contact us if you have any additional questions.    Gilson Krishnamurthy MD  Cardiology   [facility]

## 2017-04-05 NOTE — ASSESSMENT & PLAN NOTE
- Continue ASA and Statin   - Will hold Plavix for tomorrow morning. Its possible if colonoscopy cannot obtain biopsy, General surgery will be consulted.

## 2017-04-05 NOTE — ASSESSMENT & PLAN NOTE
CT scan showed a RLQ mass. Reviewed CT scan with Dr. Jeansonne to help determine if this mass was insider the intestinal tract and reachable by colonoscopy vs outside the tract. We felt it was undetermined, but if in the tract, reachable by scope. Therefore, a colonoscopy would be our recommendation. This was discussed with the patient.

## 2017-04-05 NOTE — ASSESSMENT & PLAN NOTE
78 yo female with lower abdominal pain, etiology unclear, but noted to have RLQ abdominal mass on CT scan without obstruction. Continue supportive care. Consider colonoscopy.

## 2017-04-05 NOTE — SUBJECTIVE & OBJECTIVE
Past Medical History:   Diagnosis Date    Arthritis     Coronary artery disease     Depression     GERD (gastroesophageal reflux disease)     Gout, arthritis     Hyperlipidemia     Hypertension     Hypothyroidism     Lung nodule 2014    RML--stable    Pneumonia        Past Surgical History:   Procedure Laterality Date    APPENDECTOMY      CARDIAC PACEMAKER PLACEMENT  01/22/2015    CHOLECYSTECTOMY      CORONARY ANGIOPLASTY  02/2014    CORONARY STENT PLACEMENT  02/05/2014    GASTRIC BYPASS      HERNIA REPAIR      TONSILLECTOMY      TOTAL KNEE ARTHROPLASTY Bilateral        Review of patient's allergies indicates:   Allergen Reactions    Corticosteroids (glucocorticoids) Nausea Only and Other (See Comments)     Stomach pain, dizziness, headache    Oxycodone Other (See Comments)     Blood pressure dropped       No current facility-administered medications on file prior to encounter.      Current Outpatient Prescriptions on File Prior to Encounter   Medication Sig    allopurinol (ZYLOPRIM) 100 MG tablet TAKE 2 TABLET BY MOUTH DAILY    aspirin (ECOTRIN) 81 MG EC tablet Take 81 mg by mouth once daily.     baicalin-catechin 500 mg Cap Take 500 mg by mouth 2 (two) times daily.    clopidogrel (PLAVIX) 75 mg tablet Take 1 tablet (75 mg total) by mouth once daily.    ergocalciferol, vitamin D2, 400 unit Tab Take by mouth daily.    fluticasone (FLONASE) 50 mcg/actuation nasal spray 2 sprays by Each Nare route once daily.    gabapentin (NEURONTIN) 100 MG capsule Take 100 mg by mouth 3 (three) times daily.    hydrochlorothiazide (HYDRODIURIL) 12.5 MG Tab TAKE 1 TABLET BY MOUTH ONCE DAILY    isosorbide mononitrate (IMDUR) 30 MG 24 hr tablet TAKE 1 TABLET BY MOUTH EVERY DAY    levothyroxine (SYNTHROID) 75 MCG tablet TAKE 1 TABLET(75 MCG) BY MOUTH BEFORE BREAKFAST    meloxicam (MOBIC) 7.5 MG tablet TAKE 1 TABLET BY MOUTH ONCE DAILY AS NEEDED FOR PAIN    multivitamin capsule Take 1 capsule by mouth once  daily.    nitroglycerin 400 mcg/spray SprA Place onto the tongue.    ranitidine (ZANTAC) 150 MG tablet Take 150 mg by mouth nightly.    rosuvastatin (CRESTOR) 10 MG tablet Take 1 tablet (10 mg total) by mouth once daily.    sertraline (ZOLOFT) 100 MG tablet Take 1 tablet (100 mg total) by mouth once daily.    turmeric root extract 500 mg Cap Take 500 mg by mouth 2 (two) times daily.    verapamil (CALAN-SR) 120 MG CR tablet Take 1 tablet (120 mg total) by mouth nightly. One-half tablet by mouth nightly    ZINC ACETATE ORAL 250 mg.    COLCRYS 0.6 mg tablet TAKE 1 TABLET(0.6 MG) BY MOUTH EVERY DAY     Family History     Problem Relation (Age of Onset)    Heart disease Mother    Hypertension Mother    Leukemia Brother    Pancreatic cancer Sister    Stomach cancer Father        Social History Main Topics    Smoking status: Former Smoker    Smokeless tobacco: Not on file    Alcohol use No    Drug use: No    Sexual activity: Not on file     Review of Systems   Constitutional: Negative for appetite change, chills and fever.   HENT: Negative for trouble swallowing and voice change.    Eyes: Negative.    Respiratory: Negative for apnea, cough, choking, chest tightness, shortness of breath, wheezing and stridor.    Cardiovascular: Positive for chest pain. Negative for palpitations and leg swelling.   Gastrointestinal: Positive for abdominal pain, nausea and vomiting. Negative for blood in stool, constipation, diarrhea and rectal pain.   Endocrine: Negative.    Genitourinary: Negative.    Musculoskeletal: Negative.    Skin: Negative.    Allergic/Immunologic: Negative.    Neurological: Negative for dizziness, tremors, seizures, syncope, facial asymmetry, speech difficulty, weakness, light-headedness, numbness and headaches.   Hematological: Negative.    Psychiatric/Behavioral: Negative.    All other systems reviewed and are negative.    Objective:     Vital Signs (Most Recent):  Temp: 98.1 °F (36.7 °C) (04/05/17  0704)  Pulse: 76 (04/05/17 1318)  Resp: 20 (04/05/17 1318)  BP: 120/75 (04/05/17 1318)  SpO2: 95 % (04/05/17 1318) Vital Signs (24h Range):  Temp:  [98.1 °F (36.7 °C)] 98.1 °F (36.7 °C)  Pulse:  [75-93] 76  Resp:  [11-20] 20  SpO2:  [95 %-98 %] 95 %  BP: (120-175)/() 120/75     Weight: 82.6 kg (182 lb)  Body mass index is 31.24 kg/(m^2).    Physical Exam   Constitutional: She is oriented to person, place, and time. She appears well-developed.   HENT:   Head: Normocephalic and atraumatic.   Eyes: Conjunctivae and EOM are normal.   Neck: Normal range of motion. Neck supple.   Cardiovascular: Normal rate, regular rhythm, normal heart sounds and intact distal pulses.    No murmur heard.  Pulmonary/Chest: Effort normal and breath sounds normal. No respiratory distress. She has no wheezes.   Abdominal: Soft. Bowel sounds are normal. There is no tenderness.   Musculoskeletal: Normal range of motion.   Neurological: She is alert and oriented to person, place, and time.   Skin: Skin is warm and dry.   Psychiatric: She has a normal mood and affect. Her behavior is normal. Judgment and thought content normal.   Nursing note and vitals reviewed.       Significant Labs:   CBC:   Recent Labs  Lab 04/05/17  0807   WBC 13.25*   HGB 13.0   HCT 40.4        CMP:   Recent Labs  Lab 04/05/17  0807      K 4.6   CL 98   CO2 24   *   BUN 17   CREATININE 1.1   CALCIUM 10.0   PROT 8.6*   ALBUMIN 4.2   BILITOT 0.4   ALKPHOS 81   AST 37   ALT 22   ANIONGAP 15   EGFRNONAA 49*     Coagulation:   Recent Labs  Lab 04/05/17  0807   INR 1.0   APTT 26.3     Lactic Acid:   Recent Labs  Lab 04/05/17  0807   LACTATE 1.5     Lipase:   Recent Labs  Lab 04/05/17  0807   LIPASE 351*     Troponin:   Recent Labs  Lab 04/05/17  0807   TROPONINI 0.179*     Urine Studies:   Recent Labs  Lab 04/05/17  0807   COLORU Yellow   APPEARANCEUA Clear   PHUR 6.0   SPECGRAV 1.025   PROTEINUA 1+*   GLUCUA Negative   KETONESU Trace*   BILIRUBINUA  1+*   OCCULTUA 1+*   NITRITE Negative   UROBILINOGEN 1.0   LEUKOCYTESUR Negative   RBCUA 5*   WBCUA 4   BACTERIA Rare   SQUAMEPITHEL 3   HYALINECASTS 0       Significant Imaging:   Imaging Results         CT Abdomen Pelvis With Contrast (Final result) Result time:  04/05/17 11:18:03    Final result by Alozno Enriquez MD (04/05/17 11:18:03)    Impression:             6.9 x 7.0 x 8.4 cm soft tissue mass in the right lower quadrant centered at the terminal ileum abutting the cecum.  Adjacent conglomerate adenopathy in the right lower quadrant mesentery.  Differential considerations include small bowel adenocarcinoma, exophytic colon cancer, gastrointestinal stromal tumor, or lymphoma.  Recommend endoscopy with biopsy.  There is no evidence of bowel obstruction or perforation.    Mild mucosal edema and mucosal hyperenhancement involving the gastric remnant.  Correlate with signs and symptoms of gastritis.    1.6 cm indeterminate left adrenal adenoma.  Metastasis not excluded.        Multifocal renal cortical thinning and cortical scarring.  All CT scans at this facility use dose modulation, iterative reconstruction and/or weight based dosing when appropriate to reduce radiation dose to as low as reasonably achievable.       Electronically signed by: ALONZO ENRIQUEZ MD  Date:     04/05/17  Time:    11:18     Narrative:    Exam: CT ABDOMEN PELVIS WITH CONTRAST    Technique: Axial CT imaging was performed through the abdomen and pelvis with  75cc  of intravenous contrast. Multiplanar reformats were performed and interpreted.    Clinical History:  Abdominal pain.  generalized abdominal pain      Comparison:  None     Findings:          Lung bases are clear.    The liver is normal in size and surface contour.  No focal hepatic lesions.  The gallbladder has been removed.  No biliary ductal dilatation.  The pancreas, spleen, and right adrenal gland are within normal limits.  There is a 1.6 cm left adrenal nodule.  Marked lobulation  and cortical thinning of both kidneys.  Multifocal renal cortical scarring.  No hydronephrosis.     The patient is status post gastric bypass.  There is submucosal edema and mucosal hyperenhancement of the excluded gastric remnant.  No evidence of bowel obstruction.    In the right lower quadrant, there is a lobular soft tissue mass that measures 6.9 x 7.0 x 8.4 cm that abuts the mesenteric surface of the cecum centered at the terminal ileum.  Multiple enlarged adjacent ileocolic lymph nodes.  The conglomerate mona mass measures up to 4.1 x 2.9 cm.  Colonic diverticulosis.    Aortic atherosclerosis without evidence of aneurysm.     The urinary bladder is unremarkable. The uterus is unremarkable.  No free pelvic fluid.    No significant osseous abnormality is identified.  No suspicious osseous lesions.            X-Ray Chest AP Portable (Final result) Result time:  04/05/17 08:36:07    Final result by TASHA Kearns Sr., MD (04/05/17 08:36:07)    Impression:        1.  The lungs are clear.  2.  Surgical changes      Electronically signed by: TASHA KEARNS MD  Date:     04/05/17  Time:    08:36     Narrative:    One-view chest x-ray    Clinical History: Chest pain    Finding: A cardiac pacemaker is in place.  The leads appear to be intact.  The size of the heart is normal. The lungs are clear. There is no pneumothorax.  The costophrenic angles are sharp.  Surgical clips are projected over the left upper quadrant of the abdomen.

## 2017-04-06 ENCOUNTER — ANESTHESIA (OUTPATIENT)
Dept: ENDOSCOPY | Facility: HOSPITAL | Age: 77
DRG: 330 | End: 2017-04-06
Payer: MEDICARE

## 2017-04-06 ENCOUNTER — ANESTHESIA EVENT (OUTPATIENT)
Dept: ENDOSCOPY | Facility: HOSPITAL | Age: 77
DRG: 330 | End: 2017-04-06
Payer: MEDICARE

## 2017-04-06 ENCOUNTER — SURGERY (OUTPATIENT)
Age: 77
End: 2017-04-06

## 2017-04-06 ENCOUNTER — ANESTHESIA EVENT (OUTPATIENT)
Dept: SURGERY | Facility: HOSPITAL | Age: 77
DRG: 330 | End: 2017-04-06
Payer: MEDICARE

## 2017-04-06 VITALS — RESPIRATION RATE: 11 BRPM

## 2017-04-06 LAB
ANION GAP SERPL CALC-SCNC: 11 MMOL/L
BASOPHILS # BLD AUTO: 0.02 K/UL
BASOPHILS NFR BLD: 0.2 %
BUN SERPL-MCNC: 16 MG/DL
CALCIUM SERPL-MCNC: 9.1 MG/DL
CHLORIDE SERPL-SCNC: 106 MMOL/L
CHOLEST/HDLC SERPL: 2.4 {RATIO}
CK SERPL-CCNC: 184 U/L
CO2 SERPL-SCNC: 20 MMOL/L
CREAT SERPL-MCNC: 1 MG/DL
DIFFERENTIAL METHOD: ABNORMAL
EOSINOPHIL # BLD AUTO: 0.1 K/UL
EOSINOPHIL NFR BLD: 0.8 %
ERYTHROCYTE [DISTWIDTH] IN BLOOD BY AUTOMATED COUNT: 16.4 %
EST. GFR  (AFRICAN AMERICAN): >60 ML/MIN/1.73 M^2
EST. GFR  (NON AFRICAN AMERICAN): 55 ML/MIN/1.73 M^2
GLUCOSE SERPL-MCNC: 85 MG/DL
HCT VFR BLD AUTO: 38.9 %
HDL/CHOLESTEROL RATIO: 42 %
HDLC SERPL-MCNC: 138 MG/DL
HDLC SERPL-MCNC: 58 MG/DL
HGB BLD-MCNC: 12.3 G/DL
LDLC SERPL CALC-MCNC: 62.8 MG/DL
LYMPHOCYTES # BLD AUTO: 3.2 K/UL
LYMPHOCYTES NFR BLD: 35.6 %
MCH RBC QN AUTO: 28.2 PG
MCHC RBC AUTO-ENTMCNC: 31.6 %
MCV RBC AUTO: 89 FL
MONOCYTES # BLD AUTO: 0.7 K/UL
MONOCYTES NFR BLD: 7.9 %
NEUTROPHILS # BLD AUTO: 5 K/UL
NEUTROPHILS NFR BLD: 55.5 %
NONHDLC SERPL-MCNC: 80 MG/DL
PLATELET # BLD AUTO: 304 K/UL
PMV BLD AUTO: 10.6 FL
POTASSIUM SERPL-SCNC: 4.8 MMOL/L
RBC # BLD AUTO: 4.36 M/UL
SODIUM SERPL-SCNC: 137 MMOL/L
TRIGL SERPL-MCNC: 86 MG/DL
TROPONIN I SERPL DL<=0.01 NG/ML-MCNC: 0.12 NG/ML
TSH SERPL DL<=0.005 MIU/L-ACNC: 2.7 UIU/ML
WBC # BLD AUTO: 9.04 K/UL

## 2017-04-06 PROCEDURE — 25000003 PHARM REV CODE 250: Performed by: EMERGENCY MEDICINE

## 2017-04-06 PROCEDURE — 85025 COMPLETE CBC W/AUTO DIFF WBC: CPT

## 2017-04-06 PROCEDURE — 86304 IMMUNOASSAY TUMOR CA 125: CPT

## 2017-04-06 PROCEDURE — 27201012 HC FORCEPS, HOT/COLD, DISP: Performed by: INTERNAL MEDICINE

## 2017-04-06 PROCEDURE — 45380 COLONOSCOPY AND BIOPSY: CPT | Performed by: INTERNAL MEDICINE

## 2017-04-06 PROCEDURE — 21400001 HC TELEMETRY ROOM

## 2017-04-06 PROCEDURE — 37000008 HC ANESTHESIA 1ST 15 MINUTES: Performed by: INTERNAL MEDICINE

## 2017-04-06 PROCEDURE — 63600175 PHARM REV CODE 636 W HCPCS: Performed by: EMERGENCY MEDICINE

## 2017-04-06 PROCEDURE — 63600175 PHARM REV CODE 636 W HCPCS: Performed by: NURSE PRACTITIONER

## 2017-04-06 PROCEDURE — 45380 COLONOSCOPY AND BIOPSY: CPT | Mod: ,,, | Performed by: INTERNAL MEDICINE

## 2017-04-06 PROCEDURE — C9113 INJ PANTOPRAZOLE SODIUM, VIA: HCPCS | Performed by: EMERGENCY MEDICINE

## 2017-04-06 PROCEDURE — 82550 ASSAY OF CK (CPK): CPT

## 2017-04-06 PROCEDURE — 99232 SBSQ HOSP IP/OBS MODERATE 35: CPT | Mod: ,,, | Performed by: INTERNAL MEDICINE

## 2017-04-06 PROCEDURE — 63600175 PHARM REV CODE 636 W HCPCS: Performed by: NURSE ANESTHETIST, CERTIFIED REGISTERED

## 2017-04-06 PROCEDURE — 37000009 HC ANESTHESIA EA ADD 15 MINS: Performed by: INTERNAL MEDICINE

## 2017-04-06 PROCEDURE — 88305 TISSUE EXAM BY PATHOLOGIST: CPT | Mod: 26,,, | Performed by: PATHOLOGY

## 2017-04-06 PROCEDURE — 25000003 PHARM REV CODE 250: Performed by: NURSE ANESTHETIST, CERTIFIED REGISTERED

## 2017-04-06 PROCEDURE — 0DBB8ZX EXCISION OF ILEUM, VIA NATURAL OR ARTIFICIAL OPENING ENDOSCOPIC, DIAGNOSTIC: ICD-10-PCS | Performed by: INTERNAL MEDICINE

## 2017-04-06 PROCEDURE — 84484 ASSAY OF TROPONIN QUANT: CPT

## 2017-04-06 PROCEDURE — 84443 ASSAY THYROID STIM HORMONE: CPT

## 2017-04-06 PROCEDURE — 80048 BASIC METABOLIC PNL TOTAL CA: CPT

## 2017-04-06 PROCEDURE — 36415 COLL VENOUS BLD VENIPUNCTURE: CPT

## 2017-04-06 PROCEDURE — 25000003 PHARM REV CODE 250: Performed by: INTERNAL MEDICINE

## 2017-04-06 PROCEDURE — 88305 TISSUE EXAM BY PATHOLOGIST: CPT | Performed by: PATHOLOGY

## 2017-04-06 PROCEDURE — 99222 1ST HOSP IP/OBS MODERATE 55: CPT | Mod: ,,, | Performed by: SURGERY

## 2017-04-06 PROCEDURE — 82378 CARCINOEMBRYONIC ANTIGEN: CPT

## 2017-04-06 PROCEDURE — 25000003 PHARM REV CODE 250: Performed by: NURSE PRACTITIONER

## 2017-04-06 RX ORDER — PROPOFOL 10 MG/ML
VIAL (ML) INTRAVENOUS
Status: DISCONTINUED | OUTPATIENT
Start: 2017-04-06 | End: 2017-04-06

## 2017-04-06 RX ORDER — LIDOCAINE HYDROCHLORIDE 10 MG/ML
INJECTION INFILTRATION; PERINEURAL
Status: DISCONTINUED | OUTPATIENT
Start: 2017-04-06 | End: 2017-04-06

## 2017-04-06 RX ORDER — HYDROMORPHONE HYDROCHLORIDE 1 MG/ML
1 INJECTION, SOLUTION INTRAMUSCULAR; INTRAVENOUS; SUBCUTANEOUS EVERY 4 HOURS PRN
Status: DISCONTINUED | OUTPATIENT
Start: 2017-04-06 | End: 2017-04-06

## 2017-04-06 RX ORDER — BISACODYL 5 MG
10 TABLET, DELAYED RELEASE (ENTERIC COATED) ORAL ONCE
Status: COMPLETED | OUTPATIENT
Start: 2017-04-06 | End: 2017-04-06

## 2017-04-06 RX ORDER — SODIUM CHLORIDE, SODIUM LACTATE, POTASSIUM CHLORIDE, CALCIUM CHLORIDE 600; 310; 30; 20 MG/100ML; MG/100ML; MG/100ML; MG/100ML
INJECTION, SOLUTION INTRAVENOUS CONTINUOUS PRN
Status: DISCONTINUED | OUTPATIENT
Start: 2017-04-06 | End: 2017-04-06

## 2017-04-06 RX ORDER — HYDROCODONE BITARTRATE AND ACETAMINOPHEN 10; 325 MG/1; MG/1
1 TABLET ORAL EVERY 6 HOURS PRN
Status: DISCONTINUED | OUTPATIENT
Start: 2017-04-06 | End: 2017-04-07

## 2017-04-06 RX ORDER — SODIUM CHLORIDE, SODIUM LACTATE, POTASSIUM CHLORIDE, CALCIUM CHLORIDE 600; 310; 30; 20 MG/100ML; MG/100ML; MG/100ML; MG/100ML
INJECTION, SOLUTION INTRAVENOUS CONTINUOUS
Status: DISCONTINUED | OUTPATIENT
Start: 2017-04-06 | End: 2017-04-06

## 2017-04-06 RX ORDER — MORPHINE SULFATE 4 MG/ML
4 INJECTION, SOLUTION INTRAMUSCULAR; INTRAVENOUS EVERY 4 HOURS PRN
Status: DISCONTINUED | OUTPATIENT
Start: 2017-04-06 | End: 2017-04-06

## 2017-04-06 RX ORDER — ETOMIDATE 2 MG/ML
INJECTION INTRAVENOUS
Status: DISCONTINUED | OUTPATIENT
Start: 2017-04-06 | End: 2017-04-06

## 2017-04-06 RX ORDER — MORPHINE SULFATE 4 MG/ML
4 INJECTION, SOLUTION INTRAMUSCULAR; INTRAVENOUS EVERY 4 HOURS PRN
Status: DISCONTINUED | OUTPATIENT
Start: 2017-04-06 | End: 2017-04-07

## 2017-04-06 RX ORDER — SYRING-NEEDL,DISP,INSUL,0.3 ML 29 G X1/2"
296 SYRINGE, EMPTY DISPOSABLE MISCELLANEOUS ONCE
Status: COMPLETED | OUTPATIENT
Start: 2017-04-06 | End: 2017-04-06

## 2017-04-06 RX ADMIN — MORPHINE SULFATE 4 MG: 4 INJECTION, SOLUTION INTRAMUSCULAR; INTRAVENOUS at 09:04

## 2017-04-06 RX ADMIN — ISOSORBIDE MONONITRATE 30 MG: 30 TABLET, EXTENDED RELEASE ORAL at 08:04

## 2017-04-06 RX ADMIN — MORPHINE SULFATE 2 MG: 2 INJECTION, SOLUTION INTRAMUSCULAR; INTRAVENOUS at 05:04

## 2017-04-06 RX ADMIN — LEVOTHYROXINE SODIUM 75 MCG: 75 TABLET ORAL at 05:04

## 2017-04-06 RX ADMIN — PROPOFOL 20 MG: 10 INJECTION, EMULSION INTRAVENOUS at 09:04

## 2017-04-06 RX ADMIN — SODIUM CHLORIDE, SODIUM LACTATE, POTASSIUM CHLORIDE, AND CALCIUM CHLORIDE: .6; .31; .03; .02 INJECTION, SOLUTION INTRAVENOUS at 08:04

## 2017-04-06 RX ADMIN — LIDOCAINE HYDROCHLORIDE 50 MG: 10 INJECTION, SOLUTION INFILTRATION; PERINEURAL at 09:04

## 2017-04-06 RX ADMIN — ETOMIDATE 6 MG: 2 INJECTION, SOLUTION INTRAVENOUS at 09:04

## 2017-04-06 RX ADMIN — HEPARIN SODIUM 5000 UNITS: 5000 INJECTION, SOLUTION INTRAVENOUS; SUBCUTANEOUS at 05:04

## 2017-04-06 RX ADMIN — ALLOPURINOL 200 MG: 100 TABLET ORAL at 08:04

## 2017-04-06 RX ADMIN — MORPHINE SULFATE 2 MG: 2 INJECTION, SOLUTION INTRAMUSCULAR; INTRAVENOUS at 08:04

## 2017-04-06 RX ADMIN — ETOMIDATE 4 MG: 2 INJECTION, SOLUTION INTRAVENOUS at 09:04

## 2017-04-06 RX ADMIN — HYDROCHLOROTHIAZIDE 12.5 MG: 12.5 TABLET ORAL at 08:04

## 2017-04-06 RX ADMIN — SODIUM CHLORIDE, SODIUM LACTATE, POTASSIUM CHLORIDE, AND CALCIUM CHLORIDE: 600; 310; 30; 20 INJECTION, SOLUTION INTRAVENOUS at 09:04

## 2017-04-06 RX ADMIN — DIPHENHYDRAMINE HYDROCHLORIDE 12.5 MG: 50 INJECTION, SOLUTION INTRAMUSCULAR; INTRAVENOUS at 06:04

## 2017-04-06 RX ADMIN — ONDANSETRON 4 MG: 2 INJECTION INTRAMUSCULAR; INTRAVENOUS at 03:04

## 2017-04-06 RX ADMIN — SERTRALINE HYDROCHLORIDE 100 MG: 50 TABLET, FILM COATED ORAL at 08:04

## 2017-04-06 RX ADMIN — HYDROCODONE BITARTRATE AND ACETAMINOPHEN 1 TABLET: 10; 325 TABLET ORAL at 03:04

## 2017-04-06 RX ADMIN — BISACODYL 10 MG: 5 TABLET, COATED ORAL at 06:04

## 2017-04-06 RX ADMIN — MAGNESIUM CITRATE 296 ML: 1.75 LIQUID ORAL at 06:04

## 2017-04-06 RX ADMIN — HEPARIN SODIUM 5000 UNITS: 5000 INJECTION, SOLUTION INTRAVENOUS; SUBCUTANEOUS at 09:04

## 2017-04-06 RX ADMIN — ETOMIDATE 10 MG: 2 INJECTION, SOLUTION INTRAVENOUS at 09:04

## 2017-04-06 RX ADMIN — MORPHINE SULFATE 2 MG: 2 INJECTION, SOLUTION INTRAMUSCULAR; INTRAVENOUS at 12:04

## 2017-04-06 RX ADMIN — ROSUVASTATIN CALCIUM 10 MG: 10 TABLET, FILM COATED ORAL at 08:04

## 2017-04-06 RX ADMIN — PANTOPRAZOLE SODIUM 40 MG: 40 INJECTION, POWDER, FOR SOLUTION INTRAVENOUS at 08:04

## 2017-04-06 NOTE — TRANSFER OF CARE
"Anesthesia Transfer of Care Note    Patient: Violet Swenson    Procedure(s) Performed: Procedure(s) (LRB):  COLONOSCOPY (N/A)    Patient location: PACU    Anesthesia Type: MAC    Transport from OR: Transported from OR on room air with adequate spontaneous ventilation    Post pain: adequate analgesia    Post assessment: no apparent anesthetic complications and tolerated procedure well    Post vital signs: stable    Level of consciousness: awake    Nausea/Vomiting: no nausea/vomiting    Complications: none          Last vitals:   Visit Vitals    BP (!) 154/63 (BP Location: Left arm, Patient Position: Lying, BP Method: Automatic)    Pulse 90    Temp 36.8 °C (98.2 °F) (Oral)    Resp 16    Ht 5' 4" (1.626 m)    Wt 82.6 kg (182 lb)    SpO2 95%    Breastfeeding No    BMI 31.24 kg/m2     "

## 2017-04-06 NOTE — ASSESSMENT & PLAN NOTE
Partially obstructing mass of terminal ileum  OR tomorrow for exploratory laparotomy likely ileocectomy/right hemicolectomy  NPO after midnight  Will continue to prep colon as she was poorly prepped for colonoscopy today  Discussed findings, procedure and risks with patient. She is agreeable to procedure.

## 2017-04-06 NOTE — ASSESSMENT & PLAN NOTE
- Cardiology following  - 2D ECHO showed EF 40 % with diastolic dysfunction   - Serial troponins, Lipid panel, and TSH pendng  - Morphine prn  - Supplemental oxygen prn, keep O2 sats > 92%  - SL Nitro prn  - Continue ASA and Statin

## 2017-04-06 NOTE — CONSULTS
[facility] -   General Surgery  Consult Note    Patient Name: Violet Swenson  MRN: [id number]  Code Status: Full Code  Admission Date: 4/5/2017  Hospital Length of Stay: 1 days  Attending Physician: Kellee Joshi MD  Primary Care Provider: Marti Coronel MD    Patient information was obtained from patient.     Inpatient consult to General Surgery  Consult performed by: LAURO RHODES  Consult ordered by: HAYLEE ARANA        Subjective:     Principal Problem: Lower abdominal pain    History of Present Illness: 78 y/o female referred for partially obstructing mass of the terminal ileum/cecum. She presented with a few day history of worsening right lower quadrant abdominal pain, nausea/emesis. She underwent CT scan concerning for ileocecal mass and subsequent colonoscopy showed partially obstructing mass of the terminal ileum.  She has 30lb weight loss in recent months, denies any fever/chills, diarrhea, constipation. She currently reports crampy abdominal pain in the RLQ. Previously had gastric bypass, cholecystectomy, appendectomy.    No current facility-administered medications on file prior to encounter.      Current Outpatient Prescriptions on File Prior to Encounter   Medication Sig    allopurinol (ZYLOPRIM) 100 MG tablet TAKE 2 TABLET BY MOUTH DAILY    aspirin (ECOTRIN) 81 MG EC tablet Take 81 mg by mouth once daily.     baicalin-catechin 500 mg Cap Take 500 mg by mouth 2 (two) times daily.    clopidogrel (PLAVIX) 75 mg tablet Take 1 tablet (75 mg total) by mouth once daily.    COLCRYS 0.6 mg tablet TAKE 1 TABLET(0.6 MG) BY MOUTH EVERY DAY    ergocalciferol, vitamin D2, 400 unit Tab Take by mouth daily.    fluticasone (FLONASE) 50 mcg/actuation nasal spray 2 sprays by Each Nare route once daily.    gabapentin (NEURONTIN) 100 MG capsule Take 100 mg by mouth 3 (three) times daily.    hydrochlorothiazide (HYDRODIURIL) 12.5 MG Tab TAKE 1 TABLET BY MOUTH ONCE DAILY     isosorbide mononitrate (IMDUR) 30 MG 24 hr tablet TAKE 1 TABLET BY MOUTH EVERY DAY    levothyroxine (SYNTHROID) 75 MCG tablet TAKE 1 TABLET(75 MCG) BY MOUTH BEFORE BREAKFAST    meloxicam (MOBIC) 7.5 MG tablet TAKE 1 TABLET BY MOUTH ONCE DAILY AS NEEDED FOR PAIN    multivitamin capsule Take 1 capsule by mouth once daily.    rosuvastatin (CRESTOR) 10 MG tablet Take 1 tablet (10 mg total) by mouth once daily.    sertraline (ZOLOFT) 100 MG tablet Take 1 tablet (100 mg total) by mouth once daily.    turmeric root extract 500 mg Cap Take 500 mg by mouth 2 (two) times daily.    ZINC ACETATE ORAL 250 mg.    nitroglycerin 400 mcg/spray SprA Place onto the tongue.    ranitidine (ZANTAC) 150 MG tablet Take 150 mg by mouth nightly.       Review of patient's allergies indicates:   Allergen Reactions    Corticosteroids (glucocorticoids) Nausea Only and Other (See Comments)     Stomach pain, dizziness, headache    Oxycodone Other (See Comments)     Blood pressure dropped       Past Medical History:   Diagnosis Date    Arthritis     Coronary artery disease     Depression     GERD (gastroesophageal reflux disease)     Gout, arthritis     Hyperlipidemia     Hypertension     Hypothyroidism     Lung nodule 2014    RML--stable    Pneumonia      Past Surgical History:   Procedure Laterality Date    APPENDECTOMY      CARDIAC PACEMAKER PLACEMENT  01/22/2015    CHOLECYSTECTOMY      CORONARY ANGIOPLASTY  02/2014    CORONARY STENT PLACEMENT  02/05/2014    GASTRIC BYPASS      HERNIA REPAIR      TONSILLECTOMY      TOTAL KNEE ARTHROPLASTY Bilateral      Family History     Problem Relation (Age of Onset)    Heart disease Mother    Hypertension Mother    Leukemia Brother    Pancreatic cancer Sister    Stomach cancer Father        Social History Main Topics    Smoking status: Former Smoker    Smokeless tobacco: Not on file    Alcohol use No    Drug use: No    Sexual activity: Not on file     Review of Systems    Constitutional: Positive for unexpected weight change. Negative for chills and fever.   HENT: Negative for congestion.    Eyes: Negative for visual disturbance.   Respiratory: Negative for cough and shortness of breath.    Cardiovascular: Negative for chest pain, palpitations and leg swelling.   Gastrointestinal: Positive for abdominal pain, nausea and vomiting. Negative for abdominal distention, constipation and diarrhea.   Endocrine: Negative for polyuria.   Genitourinary: Negative for dysuria.   Skin: Negative for rash.   Neurological: Negative for dizziness and light-headedness.   Hematological: Negative for adenopathy.     Objective:     Vital Signs (Most Recent):  Temp: 98 °F (36.7 °C) (04/06/17 1500)  Pulse: 77 (04/06/17 1500)  Resp: 16 (04/06/17 1500)  BP: (!) 152/82 (04/06/17 1500)  SpO2: (!) 94 % (04/06/17 1500) Vital Signs (24h Range):  Temp:  [97.8 °F (36.6 °C)-98.8 °F (37.1 °C)] 98 °F (36.7 °C)  Pulse:  [75-98] 77  Resp:  [8-44] 16  SpO2:  [92 %-99 %] 94 %  BP: (123-181)/() 152/82     Weight: 82.6 kg (182 lb)  Body mass index is 31.24 kg/(m^2).    Physical Exam   Constitutional: She is oriented to person, place, and time. She appears well-developed and well-nourished.   HENT:   Head: Normocephalic and atraumatic.   Eyes: EOM are normal.   Neck: Neck supple.   Cardiovascular: Normal rate and regular rhythm.    Pulmonary/Chest: Effort normal and breath sounds normal.   Abdominal: Soft. Bowel sounds are normal. She exhibits no distension. Mass: ?fullness possible mass, exam limited due to pain. There is tenderness (worse in RLQ).   Well healed midline surgical scar   Neurological: She is alert and oriented to person, place, and time.   Skin: Skin is warm and dry.   Vitals reviewed.      Significant Labs:  CBC:   Recent Labs  Lab 04/06/17  0826   WBC 9.04   RBC 4.36   HGB 12.3   HCT 38.9      MCV 89   MCH 28.2   MCHC 31.6*     CMP:   Recent Labs  Lab 04/05/17  0807 04/06/17  0509   *  85   CALCIUM 10.0 9.1   ALBUMIN 4.2  --    PROT 8.6*  --     137   K 4.6 4.8   CO2 24 20*   CL 98 106   BUN 17 16   CREATININE 1.1 1.0   ALKPHOS 81  --    ALT 22  --    AST 37  --    BILITOT 0.4  --        Significant Diagnostics:  CT:  6.9 x 7.0 x 8.4 cm soft tissue mass in the right lower quadrant centered at the terminal ileum abutting the cecum.  Adjacent conglomerate adenopathy in the right lower quadrant mesentery.  Differential considerations include small bowel adenocarcinoma, exophytic colon cancer, gastrointestinal stromal tumor, or lymphoma.  Recommend endoscopy with biopsy.  There is no evidence of bowel obstruction or perforation.    Mild mucosal edema and mucosal hyperenhancement involving the gastric remnant.  Correlate with signs and symptoms of gastritis.    1.6 cm indeterminate left adrenal adenoma.  Metastasis not excluded.    Multifocal renal cortical thinning and cortical scarring.    CXR      1.  The lungs are clear.  2.  Surgical changes    Assessment/Plan:     * Lower abdominal pain  Partially obstructing mass of terminal ileum  OR tomorrow for exploratory laparotomy likely ileocectomy/right hemicolectomy  NPO after midnight  Will continue to prep colon as she was poorly prepped for colonoscopy today  Discussed findings, procedure and risks with patient. She is agreeable to procedure.    CAD, multiple vessel  Cards following - cleared for surgery    VTE Risk Mitigation         Ordered     Medium Risk of VTE  Once      04/05/17 1441     heparin (porcine) injection 5,000 Units  Every 8 hours     Route:  Subcutaneous        04/05/17 1231          Thank you for your consult. I will follow-up with patient. Please contact us if you have any additional questions.    Orlando Moulton MD  General Surgery  [facility] -

## 2017-04-06 NOTE — ANESTHESIA RELEASE NOTE
"Anesthesia Release from PACU Note    Patient: Violet Swenson    Procedure(s) Performed: Procedure(s) (LRB):  COLONOSCOPY (N/A)    Anesthesia type: MAC    Post pain: Adequate analgesia    Post assessment: no apparent anesthetic complications, tolerated procedure well and no evidence of recall    Last Vitals:   Visit Vitals    BP (!) 123/56    Pulse 75    Temp 36.8 °C (98.2 °F) (Oral)    Resp 17    Ht 5' 4" (1.626 m)    Wt 82.6 kg (182 lb)    SpO2 (!) 92%    Breastfeeding No    BMI 31.24 kg/m2       Post vital signs: stable    Level of consciousness: awake    Nausea/Vomiting: no nausea/no vomiting    Complications: none    Airway Patency: patent    Respiratory: unassisted, spontaneous ventilation, room air    Cardiovascular: stable and blood pressure at baseline    Hydration: euvolemic  "

## 2017-04-06 NOTE — ASSESSMENT & PLAN NOTE
- Admit to Inpatient  - CT abdomen/pelvis revealed:  Soft tissue mass in the RLQ. Colonoscopy with biopsy done today.   - Gastroenterology following   - General Surgery consulted  - Clear liquid diet.   - Analgesics/Antiemetic prn

## 2017-04-06 NOTE — INTERVAL H&P NOTE
The patient has been examined and the H&P has been reviewed:    I concur with the findings and no changes have occurred since H&P was written.    Anesthesia/Surgery risks, benefits and alternative options discussed and understood by patient/family.          Active Hospital Problems    Diagnosis  POA    *Lower abdominal pain [R10.30]  Yes    Chest pain [R07.9]  Yes    Abnormal CT scan, gastrointestinal tract [R93.3]  Yes    Essential hypertension [I10]  Yes     Chronic    Hyperlipidemia [E78.5]  Yes     Chronic    Hypothyroidism (acquired) [E03.9]  Yes     Chronic    CAD, multiple vessel [I25.10]  Yes     Chronic      Resolved Hospital Problems    Diagnosis Date Resolved POA   No resolved problems to display.

## 2017-04-06 NOTE — SUBJECTIVE & OBJECTIVE
No current facility-administered medications on file prior to encounter.      Current Outpatient Prescriptions on File Prior to Encounter   Medication Sig    allopurinol (ZYLOPRIM) 100 MG tablet TAKE 2 TABLET BY MOUTH DAILY    aspirin (ECOTRIN) 81 MG EC tablet Take 81 mg by mouth once daily.     baicalin-catechin 500 mg Cap Take 500 mg by mouth 2 (two) times daily.    clopidogrel (PLAVIX) 75 mg tablet Take 1 tablet (75 mg total) by mouth once daily.    COLCRYS 0.6 mg tablet TAKE 1 TABLET(0.6 MG) BY MOUTH EVERY DAY    ergocalciferol, vitamin D2, 400 unit Tab Take by mouth daily.    fluticasone (FLONASE) 50 mcg/actuation nasal spray 2 sprays by Each Nare route once daily.    gabapentin (NEURONTIN) 100 MG capsule Take 100 mg by mouth 3 (three) times daily.    hydrochlorothiazide (HYDRODIURIL) 12.5 MG Tab TAKE 1 TABLET BY MOUTH ONCE DAILY    isosorbide mononitrate (IMDUR) 30 MG 24 hr tablet TAKE 1 TABLET BY MOUTH EVERY DAY    levothyroxine (SYNTHROID) 75 MCG tablet TAKE 1 TABLET(75 MCG) BY MOUTH BEFORE BREAKFAST    meloxicam (MOBIC) 7.5 MG tablet TAKE 1 TABLET BY MOUTH ONCE DAILY AS NEEDED FOR PAIN    multivitamin capsule Take 1 capsule by mouth once daily.    rosuvastatin (CRESTOR) 10 MG tablet Take 1 tablet (10 mg total) by mouth once daily.    sertraline (ZOLOFT) 100 MG tablet Take 1 tablet (100 mg total) by mouth once daily.    turmeric root extract 500 mg Cap Take 500 mg by mouth 2 (two) times daily.    ZINC ACETATE ORAL 250 mg.    nitroglycerin 400 mcg/spray SprA Place onto the tongue.    ranitidine (ZANTAC) 150 MG tablet Take 150 mg by mouth nightly.       Review of patient's allergies indicates:   Allergen Reactions    Corticosteroids (glucocorticoids) Nausea Only and Other (See Comments)     Stomach pain, dizziness, headache    Oxycodone Other (See Comments)     Blood pressure dropped       Past Medical History:   Diagnosis Date    Arthritis     Coronary artery disease     Depression      GERD (gastroesophageal reflux disease)     Gout, arthritis     Hyperlipidemia     Hypertension     Hypothyroidism     Lung nodule 2014    RML--stable    Pneumonia      Past Surgical History:   Procedure Laterality Date    APPENDECTOMY      CARDIAC PACEMAKER PLACEMENT  01/22/2015    CHOLECYSTECTOMY      CORONARY ANGIOPLASTY  02/2014    CORONARY STENT PLACEMENT  02/05/2014    GASTRIC BYPASS      HERNIA REPAIR      TONSILLECTOMY      TOTAL KNEE ARTHROPLASTY Bilateral      Family History     Problem Relation (Age of Onset)    Heart disease Mother    Hypertension Mother    Leukemia Brother    Pancreatic cancer Sister    Stomach cancer Father        Social History Main Topics    Smoking status: Former Smoker    Smokeless tobacco: Not on file    Alcohol use No    Drug use: No    Sexual activity: Not on file     Review of Systems   Constitutional: Positive for unexpected weight change. Negative for chills and fever.   HENT: Negative for congestion.    Eyes: Negative for visual disturbance.   Respiratory: Negative for cough and shortness of breath.    Cardiovascular: Negative for chest pain, palpitations and leg swelling.   Gastrointestinal: Positive for abdominal pain, nausea and vomiting. Negative for abdominal distention, constipation and diarrhea.   Endocrine: Negative for polyuria.   Genitourinary: Negative for dysuria.   Skin: Negative for rash.   Neurological: Negative for dizziness and light-headedness.   Hematological: Negative for adenopathy.     Objective:     Vital Signs (Most Recent):  Temp: 98 °F (36.7 °C) (04/06/17 1500)  Pulse: 77 (04/06/17 1500)  Resp: 16 (04/06/17 1500)  BP: (!) 152/82 (04/06/17 1500)  SpO2: (!) 94 % (04/06/17 1500) Vital Signs (24h Range):  Temp:  [97.8 °F (36.6 °C)-98.8 °F (37.1 °C)] 98 °F (36.7 °C)  Pulse:  [75-98] 77  Resp:  [8-44] 16  SpO2:  [92 %-99 %] 94 %  BP: (123-181)/() 152/82     Weight: 82.6 kg (182 lb)  Body mass index is 31.24  kg/(m^2).    Physical Exam   Constitutional: She is oriented to person, place, and time. She appears well-developed and well-nourished.   HENT:   Head: Normocephalic and atraumatic.   Eyes: EOM are normal.   Neck: Neck supple.   Cardiovascular: Normal rate and regular rhythm.    Pulmonary/Chest: Effort normal and breath sounds normal.   Abdominal: Soft. Bowel sounds are normal. She exhibits no distension. Mass: ?fullness possible mass, exam limited due to pain. There is tenderness (worse in RLQ).   Well healed midline surgical scar   Neurological: She is alert and oriented to person, place, and time.   Skin: Skin is warm and dry.   Vitals reviewed.      Significant Labs:  CBC:   Recent Labs  Lab 04/06/17  0826   WBC 9.04   RBC 4.36   HGB 12.3   HCT 38.9      MCV 89   MCH 28.2   MCHC 31.6*     CMP:   Recent Labs  Lab 04/05/17  0807 04/06/17  0509   * 85   CALCIUM 10.0 9.1   ALBUMIN 4.2  --    PROT 8.6*  --     137   K 4.6 4.8   CO2 24 20*   CL 98 106   BUN 17 16   CREATININE 1.1 1.0   ALKPHOS 81  --    ALT 22  --    AST 37  --    BILITOT 0.4  --        Significant Diagnostics:  CT:  6.9 x 7.0 x 8.4 cm soft tissue mass in the right lower quadrant centered at the terminal ileum abutting the cecum.  Adjacent conglomerate adenopathy in the right lower quadrant mesentery.  Differential considerations include small bowel adenocarcinoma, exophytic colon cancer, gastrointestinal stromal tumor, or lymphoma.  Recommend endoscopy with biopsy.  There is no evidence of bowel obstruction or perforation.    Mild mucosal edema and mucosal hyperenhancement involving the gastric remnant.  Correlate with signs and symptoms of gastritis.    1.6 cm indeterminate left adrenal adenoma.  Metastasis not excluded.    Multifocal renal cortical thinning and cortical scarring.    CXR      1.  The lungs are clear.  2.  Surgical changes

## 2017-04-06 NOTE — H&P (VIEW-ONLY)
[facility] -   Gastroenterology  Consult Note    Patient Name: Violet Swenson  MRN: [id number]  Admission Date: 4/5/2017  Hospital Length of Stay: 0 days  Code Status: Full Code   Attending Provider: Kellee Joshi MD   Consulting Provider: Radha Burnham PA-C  Primary Care Physician: Marti Coronel MD  Principal Problem:<principal problem not specified>    Inpatient consult to Gastroenterology  Consult performed by: RADHA BURNHAM  Consult ordered by: THELMA GAMBLE  Reason for consult: Abnormal CT scan; abdominal mass        Subjective:     HPI:  The patient presented to the ER for abdominal pain. The pain started two days ago. She describes it as lower abdominal cramps. Associated with nausea, vomiting and decreased appetite. She had a gastric bypass many years ago. She says since then she has been having intermittent nausea, vomiting and abdomina pain. These episodes usually lasts a day, but this one lasted longer. About three months ago, she noticed she had lost almost 30 pounds without a change in diet. She says her weight has stabilized since then. She has alternating bowel habits which is her baseline. No change in bowel habits. She denies hematochezia or melena. She has never had a colonoscopy. She had an EGD many years ago. Labs are unremarkable. CT scan with contrast showed a mass near the cecum/terminal ileum without obstruction.     Past Medical History:   Diagnosis Date    Arthritis     Coronary artery disease     Depression     GERD (gastroesophageal reflux disease)     Gout, arthritis     Hyperlipidemia     Hypertension     Hypothyroidism     Lung nodule 2014    RML--stable    Pneumonia        Past Surgical History:   Procedure Laterality Date    APPENDECTOMY      CARDIAC PACEMAKER PLACEMENT  01/22/2015    CHOLECYSTECTOMY      CORONARY ANGIOPLASTY  02/2014    CORONARY STENT PLACEMENT  02/05/2014    GASTRIC BYPASS      HERNIA REPAIR      TONSILLECTOMY       TOTAL KNEE ARTHROPLASTY Bilateral        Review of patient's allergies indicates:   Allergen Reactions    Corticosteroids (glucocorticoids) Nausea Only and Other (See Comments)     Stomach pain, dizziness, headache    Oxycodone Other (See Comments)     Blood pressure dropped     Family History     Problem Relation (Age of Onset)    Heart disease Mother    Hypertension Mother    Pancreatic cancer Sister    Stomach cancer Father, Brother        Social History Main Topics    Smoking status: Former Smoker    Smokeless tobacco: Not on file    Alcohol use No    Drug use: No    Sexual activity: Not on file     Review of Systems   Constitutional: Negative for fever.   HENT: Negative for hearing loss.    Eyes: Negative for visual disturbance.   Respiratory: Negative for cough and shortness of breath.    Cardiovascular: Positive for chest pain (CP last n ight which has resolved.).   Gastrointestinal:        As per HPI.   Genitourinary: Negative for dysuria, frequency and hematuria.   Musculoskeletal: Negative for arthralgias and back pain.   Skin: Negative for rash.   Neurological: Negative for seizures, syncope, numbness and headaches.   Hematological: Does not bruise/bleed easily.   Psychiatric/Behavioral: The patient is not nervous/anxious.      Objective:     Vital Signs (Most Recent):  Temp: 98.1 °F (36.7 °C) (04/05/17 0704)  Pulse: 76 (04/05/17 1318)  Resp: 20 (04/05/17 1318)  BP: 120/75 (04/05/17 1318)  SpO2: 95 % (04/05/17 1318) Vital Signs (24h Range):  Temp:  [98.1 °F (36.7 °C)] 98.1 °F (36.7 °C)  Pulse:  [75-93] 76  Resp:  [11-20] 20  SpO2:  [95 %-98 %] 95 %  BP: (120-175)/() 120/75     Weight: 82.6 kg (182 lb) (04/05/17 0704)  Body mass index is 31.24 kg/(m^2).    No intake or output data in the 24 hours ending 04/05/17 1426    Lines/Drains/Airways     Peripheral Intravenous Line                 Peripheral IV - Single Lumen 04/05/17 0884 Left Antecubital less than 1 day                Physical  Exam   Constitutional: She is oriented to person, place, and time. Vital signs are normal. She appears well-developed and well-nourished.   HENT:   Head: Normocephalic and atraumatic.   Eyes: EOM are normal.   Neck: Normal range of motion. Neck supple. Carotid bruit is not present.   Cardiovascular: Normal rate and regular rhythm.    No murmur heard.  Pulmonary/Chest: Effort normal and breath sounds normal. No respiratory distress. She has no wheezes.   Abdominal: Soft. Normal appearance and bowel sounds are normal. She exhibits no distension and no mass. There is tenderness (with deep palpation) in the suprapubic area.   Musculoskeletal: She exhibits no edema.   Neurological: She is alert and oriented to person, place, and time. No cranial nerve deficit.   Skin: Skin is warm and dry. No rash noted.   Psychiatric: Her behavior is normal.       Significant Labs:  CBC:   Recent Labs  Lab 04/05/17  0807   WBC 13.25*   HGB 13.0   HCT 40.4        CMP:   Recent Labs  Lab 04/05/17  0807   *   CALCIUM 10.0   ALBUMIN 4.2   PROT 8.6*      K 4.6   CO2 24   CL 98   BUN 17   CREATININE 1.1   ALKPHOS 81   ALT 22   AST 37   BILITOT 0.4       Significant Imaging:  Imaging results within the past 24 hours have been reviewed.    Assessment/Plan:     Lower abdominal pain  76 yo female with lower abdominal pain, etiology unclear, but noted to have RLQ abdominal mass on CT scan without obstruction. Continue supportive care. Consider colonoscopy.     Abnormal CT scan, gastrointestinal tract  CT scan showed a RLQ mass. Reviewed CT scan with Dr. Jeansonne to help determine if this mass was insider the intestinal tract and reachable by colonoscopy vs outside the tract. We felt it was undetermined, but if in the tract, reachable by scope. Therefore, a colonoscopy would be our recommendation. This was discussed with the patient.     CAD, multiple vessel  The patient reported chest pain which resolved. It was in relation to  the other GI symptoms. EKG unremarkable. Troponin elevated. Repeat labs pending.       Thank you for your consult. I will follow-up with patient. Please contact us if you have any additional questions.    Franco Burnham PA-C  Gastroenterology  [facility] [place]

## 2017-04-06 NOTE — DISCHARGE INSTRUCTIONS
Diverticulosis    Diverticulosis means that small pouches have formed in the wall of your large intestine (colon). Most often, this problem causes no symptoms and is common as people age. But the pouches in the colon are at risk of becoming infected. When this happens, the condition is called diverticulitis. Although most people with diverticulosis never develop diverticulitis, it is still not uncommon. Rectal bleeding can also occur and in less common situations, a type of colon inflammation called colitis.  While most people do not have symptoms, some people with diverticulosis may have:  · Abdominal cramps and pain  · Bloating  · Constipation  · Change in bowel habits  Causes  The exact cause of diverticulosis (and diverticulitis) has not been proved, but a few things are associated with the condition:  · Low-fiber diet  · Constipation  · Lack of exercise  Your healthcare provider will talk with you about how to manage your condition. Diet changes may be all that are needed to help control diverticulosis and prevent progression to diverticulitis. If you develop diverticulitis, you will likely need other treatments.  Home care  You may be told to take fiber supplements daily. Fiber adds bulk to the stool so that it passes through the colon more easily. Stool softeners may be recommended. You may also be given medications for pain relief. Be sure to take all medications as directed.  In the past, people were told to avoid corn, nuts, and seeds. This is no longer necessary.  Follow these guidelines when caring for yourself at home:  · Eat unprocessed foods that are high in fiber. Whole grains, fruits, and vegetables are good choices.  · Drink 6 to 8 glasses of water every day unless your healthcare provider has you limit how much fluid you should have.  · Watch for changes in your bowel movements. Tell your provider if you notice any changes.  · Begin an exercise program. Ask your provider how to get started.  Generally, walking is the best.  · Get plenty of rest and sleep.  Follow-up care  Follow up with your healthcare provider, or as advised. Regular visits may be needed to check on your health. Sometimes special procedures such as colonoscopy, are needed after an episode of diverticulitis or blooding. Be sure to keep all your appointments.  If a stool sample was taken, or cultures were done, you should be told if they are positive, or if your treatment needs to be changed. You can call as directed for the results.  If X-rays were done, a radiologist will look at them. You will be told if there is a change in your treatment.  If antibiotics were prescribed, be sure to finish them all.  When to seek medical advice  Call your healthcare provider right away if any of these occur:  · Fever of 100.4°F (38°C) or higher, or as directed by your healthcare provider  · Severe cramps in the lower left side of the abdomen or pain that is getting worse  · Tenderness in the lower left side of the abdomen or worsening pain throughout the abdomen  · Diarrhea or constipation that doesn't get better within 24 hours  · Nausea and vomiting  · Bleeding from the rectum  Call 911  Call emergency services if any of the following occur:  · Trouble breathing  · Confusion  · Very drowsy or trouble awakening  · Fainting or loss of consciousness  · Rapid heart rate  · Chest pain  Date Last Reviewed: 12/30/2015 © 2000-2016 [employer]. [address] [place] [address], [place] [address]. All rights reserved. This information is not intended as a substitute for professional medical care. Always follow your healthcare professional's instructions.

## 2017-04-06 NOTE — PROGRESS NOTES
Cardiology Progress Note        SUBJECTIVE:     History of Present Illness:  Patient is a 77 y.o. female presents with abd cramp. Had colonoscopy today showed colon mass and surgery was consulted.  No chest pain and dyspnea  Troponin trending down.  EKG v-paced.  ECho showed EF 40% with RWMA. LVDD, LAE.    OBJECTIVE:     Vital Signs (Most Recent)  Temp: 97.8 °F (36.6 °C) (04/06/17 1100)  Pulse: 86 (04/06/17 1100)  Resp: 16 (04/06/17 1100)  BP: 125/80 (04/06/17 1100)  SpO2: 96 % (04/06/17 1100)    Vital Signs Range (Last 24H):  Temp:  [97.8 °F (36.6 °C)-98.8 °F (37.1 °C)]   Pulse:  [75-98]   Resp:  [8-44]   BP: (123-181)/()   SpO2:  [92 %-99 %]     Intake/Output last 3 shifts:  I/O last 3 completed shifts:  In: 3442.9 [P.O.:2000; I.V.:1442.9]  Out: -     Intake/Output this shift:  I/O this shift:  In: 400 [I.V.:400]  Out: -     Review of patient's allergies indicates:   Allergen Reactions    Corticosteroids (glucocorticoids) Nausea Only and Other (See Comments)     Stomach pain, dizziness, headache    Oxycodone Other (See Comments)     Blood pressure dropped       Current Facility-Administered Medications   Medication    0.9%  NaCl infusion    allopurinol tablet 200 mg    aspirin EC tablet 81 mg    bisacodyl EC tablet 10 mg    diphenhydrAMINE injection 12.5 mg    heparin (porcine) injection 5,000 Units    hydrochlorothiazide tablet 12.5 mg    hydrocodone-acetaminophen 10-325mg per tablet 1 tablet    isosorbide mononitrate 24 hr tablet 30 mg    levothyroxine tablet 75 mcg    magnesium citrate solution 296 mL    morphine injection 4 mg    nitroGLYCERIN SL tablet 0.4 mg    ondansetron injection 4 mg    pantoprazole injection 40 mg    promethazine (PHENERGAN) 6.25 mg in dextrose 5 % 50 mL IVPB    rosuvastatin tablet 10 mg    sertraline tablet 100 mg     No current facility-administered medications on file prior to encounter.      Current Outpatient Prescriptions on File Prior to Encounter    Medication Sig    allopurinol (ZYLOPRIM) 100 MG tablet TAKE 2 TABLET BY MOUTH DAILY    aspirin (ECOTRIN) 81 MG EC tablet Take 81 mg by mouth once daily.     baicalin-catechin 500 mg Cap Take 500 mg by mouth 2 (two) times daily.    clopidogrel (PLAVIX) 75 mg tablet Take 1 tablet (75 mg total) by mouth once daily.    COLCRYS 0.6 mg tablet TAKE 1 TABLET(0.6 MG) BY MOUTH EVERY DAY    ergocalciferol, vitamin D2, 400 unit Tab Take by mouth daily.    fluticasone (FLONASE) 50 mcg/actuation nasal spray 2 sprays by Each Nare route once daily.    gabapentin (NEURONTIN) 100 MG capsule Take 100 mg by mouth 3 (three) times daily.    hydrochlorothiazide (HYDRODIURIL) 12.5 MG Tab TAKE 1 TABLET BY MOUTH ONCE DAILY    isosorbide mononitrate (IMDUR) 30 MG 24 hr tablet TAKE 1 TABLET BY MOUTH EVERY DAY    levothyroxine (SYNTHROID) 75 MCG tablet TAKE 1 TABLET(75 MCG) BY MOUTH BEFORE BREAKFAST    meloxicam (MOBIC) 7.5 MG tablet TAKE 1 TABLET BY MOUTH ONCE DAILY AS NEEDED FOR PAIN    multivitamin capsule Take 1 capsule by mouth once daily.    rosuvastatin (CRESTOR) 10 MG tablet Take 1 tablet (10 mg total) by mouth once daily.    sertraline (ZOLOFT) 100 MG tablet Take 1 tablet (100 mg total) by mouth once daily.    turmeric root extract 500 mg Cap Take 500 mg by mouth 2 (two) times daily.    ZINC ACETATE ORAL 250 mg.    nitroglycerin 400 mcg/spray SprA Place onto the tongue.    ranitidine (ZANTAC) 150 MG tablet Take 150 mg by mouth nightly.       Physical Exam:  General appearance: alert, appears stated age and cooperative  Head: Normocephalic, without obvious abnormality, atraumatic  Eyes:  conjunctivae/corneas clear. PERRL, EOM's intact. Fundi benign.  Nose: no discharge  Throat: normal findings: tongue midline and normal  Neck: no adenopathy, no carotid bruit, no JVD, supple, symmetrical, trachea midline and thyroid not enlarged, symmetric, no tenderness/mass/nodules  Back:  no skin lesions, erythema, or  scars  Lungs:  clear to auscultation bilaterally  Chest wall: no tenderness  Heart: regular rate and rhythm, S1, S2 normal, SM on left chest   Abdomen: soft, positive tender; bowel sounds normal; no masses,  no organomegaly  Extremities: extremities normal, atraumatic, no cyanosis or edema  Pulses: 2+ and symmetric  Skin: Skin color, texture, turgor normal. No rashes or lesions  Neurologic: Grossly normal    Laboratory:  Chemistry:   Lab Results   Component Value Date     04/06/2017    K 4.8 04/06/2017     04/06/2017    CO2 20 (L) 04/06/2017    BUN 16 04/06/2017    CREATININE 1.0 04/06/2017    CALCIUM 9.1 04/06/2017     Cardiac Markers:   Lab Results   Component Value Date    TROPONINI 0.122 (H) 04/06/2017     Cardiac Markers (Last 3):   Lab Results   Component Value Date    TROPONINI 0.122 (H) 04/06/2017    TROPONINI 0.172 (H) 04/05/2017    TROPONINI 0.179 (H) 04/05/2017     CBC:   Lab Results   Component Value Date    WBC 9.04 04/06/2017    HGB 12.3 04/06/2017    HCT 38.9 04/06/2017    MCV 89 04/06/2017     04/06/2017     Lipids:   Lab Results   Component Value Date    CHOL 114 (L) 11/07/2016    TRIG 106 11/07/2016    HDL 51 11/07/2016     Coagulation:   Lab Results   Component Value Date    INR 1.0 04/05/2017    APTT 26.3 04/05/2017       Diagnostic Results:  ECG: Reviewed  X-Ray: Reviewed  US: Reviewed  CT: Reviewed  Echo: Reviewed      ASSESSMENT/PLAN:     Patient Active Problem List   Diagnosis    Essential hypertension    Hyperlipidemia    Hypothyroidism (acquired)    Major depression, chronic    CAD, multiple vessel    Arthritis    S/P coronary artery stent placement    Pacemaker    Renal insufficiency    NSAID long-term use    Idiopathic chronic gout of multiple sites without tophus    Acute idiopathic gout of left foot    Primary osteoarthritis involving multiple joints    Neuropathic pain of right foot    Coronary artery disease involving native coronary artery of native  heart without angina pectoris    Stenosing tenosynovitis of finger of left hand    Myofascial pain    Chest pain    Abnormal CT scan, gastrointestinal tract    Lower abdominal pain    Abdominal pain   Elevated troponin. Demanding ischemia from abd cramp.  Chest tightness  Colon mass  CAD s/p PCI and PPM  Cardiomyopathy EF 40%, euvolemic.  H/O gastric bypass  Strong f/h GI malignancy     Plan:   Abd pain control  Continue imdur and crestor.  Resume ASA 81 mg if tolerated.  Avoid fluid overloaded.

## 2017-04-06 NOTE — PHYSICIAN QUERY
PT Name: Violet Swenson  MR #: [id number]     Physician Query Form - Documentation Clarification      CDS/: Megan Alexis RN               Contact information:[phone]    This form is a permanent document in the medical record.     Query Date: April 6, 2017    By submitting this query, we are merely seeking further clarification of documentation. Please utilize your independent clinical judgment when addressing the question(s) below.    The Medical record reflects the following:    Supporting Clinical Findings Location in Medical Record   2D ECHO revealed EF 40% with combined systolic and diastolic heart failure         HCTZ daily p.o. H&P          Mar                                                                                  Doctor, Please specify diagnosis or diagnoses associated with above clinical findings.    Provider Use Only      [ xx    ]  Chronic combined systolic and diastolic heart failure      [     ]  Other__________________________________                                                                                                               [  ] Clinically undetermined

## 2017-04-06 NOTE — PLAN OF CARE
Met with pt and family to complete d/c planning assessment. Pt states she is independent with ADLs at home and has support from family. Pt does not anticipate any d/c needs.      04/06/17 1035   Discharge Assessment   Assessment Type Discharge Planning Assessment   Confirmed/corrected address and phone number on facesheet? Yes   Assessment information obtained from? Patient;Caregiver   Prior to hospitilization cognitive status: Alert/Oriented   Prior to hospitalization functional status: Independent   Current cognitive status: Alert/Oriented   Lives With grandchild(aure);spouse   Able to Return to Prior Arrangements yes   Is patient able to care for self after discharge? Yes   How many people do you have in your home that can help with your care after discharge? 1   Who are your caregiver(s) and their phone number(s)? Lenin, spouse [phone]   Readmission Within The Last 30 Days no previous admission in last 30 days   Patient currently receives home health services? No   Does the patient currently use HME? No   Patient currently receives private duty nursing? No   Equipment Currently Used at Home walker, standard;cane, straight  (pt does not use, but has equipment from past surgery)   Do you have any problems affording any of your prescribed medications? No   On Dialysis? No   Discharge Plan A Home with family

## 2017-04-06 NOTE — PROGRESS NOTES
Problem: Patient Care Overview  Goal: Plan of Care Review  Outcome: Ongoing (interventions implemented as appropriate)  Patient is without falls or injury this shift. Colonscopy done today, surgery on tomorrow. Mag Citrate now, NPO after midnight. Morphine and Norco given per MAR. Family at bedside. Bed low and call light within reach.

## 2017-04-06 NOTE — ANESTHESIA POSTPROCEDURE EVALUATION
"Anesthesia Post Evaluation    Patient: Violet Swenson    Procedure(s) Performed: Procedure(s) (LRB):  COLONOSCOPY (N/A)    Final Anesthesia Type: MAC  Patient location during evaluation: PACU  Patient participation: Yes- Able to Participate  Level of consciousness: awake  Post-procedure vital signs: reviewed and stable  Pain management: adequate  Airway patency: patent  PONV status at discharge: No PONV  Anesthetic complications: no      Cardiovascular status: blood pressure returned to baseline and hemodynamically stable  Respiratory status: unassisted, spontaneous ventilation and room air  Hydration status: euvolemic  Follow-up not needed.        Visit Vitals    BP (!) 123/56    Pulse 75    Temp 36.8 °C (98.2 °F) (Oral)    Resp 17    Ht 5' 4" (1.626 m)    Wt 82.6 kg (182 lb)    SpO2 (!) 92%    Breastfeeding No    BMI 31.24 kg/m2       Pain/Prosper Score: Pain Assessment Performed: Yes (4/6/2017  8:53 AM)  Presence of Pain: denies (4/6/2017  9:34 AM)  Pain Rating Prior to Med Admin: 8 (4/6/2017  8:35 AM)  Pain Rating Post Med Admin: 2 (4/5/2017  4:55 PM)  Prosper Score: 8 (4/6/2017  9:34 AM)      "

## 2017-04-06 NOTE — ANESTHESIA PREPROCEDURE EVALUATION
04/06/2017  Violet Swenson is a 77 y.o., female.    [facility] Anesthesia Evaluation    I have reviewed the Patient Summary Reports.    I have reviewed the Nursing Notes.   I have reviewed the Medications.     Review of Systems  Anesthesia Hx:  No problems with previous Anesthesia  Denies Family Hx of Anesthesia complications.   Denies Personal Hx of Anesthesia complications.   Social:  Former Smoker, No Alcohol Use    Hematology/Oncology:  Hematology Normal   Oncology Normal     Cardiovascular:   Pacemaker Hypertension CAD  CABG/stent      hyperlipidemia ECG has been reviewed. ekg 4/2017:  Atrial-sensed ventricular-paced rhythm  Abnormal ECG 84  When compared with ECG of 30-MAR-2017 10:55,  No significant change was found    Echo 4/2017:   1 - Moderate left atrial enlargement.     2 - Concentric hypertrophy.     3 - Mildly to moderately depressed left ventricular systolic function (EF 40-45%). RWMA noted.    4 - Left ventricular diastolic dysfunction.     5 - Normal right ventricular systolic function .     6 - The estimated PA systolic pressure is 29 mmHg.     7 - Mild mitral regurgitation    Pacemaker placed 2014    Stent x1 2014 to lad   Pulmonary:   Denies COPD.  Denies Asthma.  Denies Sleep Apnea. pulm nodule   Renal/:   Chronic Renal Disease, CRI    Hepatic/GI:   Bowel Prep. GERD Denies Liver Disease. Denies Hepatitis.    Musculoskeletal:   Arthritis  gout   Neurological:   Denies CVA. Denies Seizures.    Endocrine:   Denies Diabetes. Hypothyroidism Denies Hyperthyroidism.    Psych:   depression          Physical Exam  General:  Obesity    Airway/Jaw/Neck:  Airway Findings: Mouth Opening: Normal Tongue: Normal  General Airway Assessment: Adult  Mallampati: II      Dental:  Dental Findings: In tact   Chest/Lungs:  Chest/Lungs Findings: Clear to auscultation, Normal Respiratory Rate      Heart/Vascular:  Heart Findings: Rate: Normal  Rhythm: Regular Rhythm  Sounds: Normal             Anesthesia Plan  Type of Anesthesia, risks & benefits discussed:  Anesthesia Type:  MAC  Patient's Preference:   Intra-op Monitoring Plan: standard ASA monitors  Intra-op Monitoring Plan Comments:   Post Op Pain Control Plan:   Post Op Pain Control Plan Comments:   Induction:   IV  Beta Blocker:  Patient is not currently on a Beta-Blocker (No further documentation required).       Informed Consent: Patient understands risks and agrees with Anesthesia plan.  Questions answered. Anesthesia consent signed with patient.  ASA Score: 3     Day of Surgery Review of History & Physical: I have interviewed and examined the patient. I have reviewed the patient's H&P dated:  There are no significant changes.  H&P update referred to the surgeon.         Ready For Surgery From Anesthesia Perspective.

## 2017-04-06 NOTE — PROGRESS NOTES
"[facility] Hospital Medicine  Progress Note    Patient Name: Violet Swenson  MRN: [id number]  Patient Class: IP- Inpatient   Admission Date: 4/5/2017  Length of Stay: 1 days  Attending Physician: Kellee Joshi MD  Primary Care Provider: Marti Coronel MD        Subjective:     Principal Problem:Lower abdominal pain    HPI:  Violet Swenson is a 77 year old female with a PMHx of Hypothyroidism, HTN, HLD, GERD, Depression, GERD, Depression, CAD, Gastric bypass '93, and PPM who presented to the Emergency Department with c/o generalized abdominal pain x 2 days. Pain describes as "cramping." Associated symptoms include: nausea, vomiting, and chest pain.  ED workup revealed:  WBC 13.25, glucose 119, lipase 351, amylase 189, troponin 0.179. CT abdomen/pelvis with soft tissue mass in the RUQ, recommend endoscopy with biopsy. CXR negative. ED discussed case with Cardiology.     Hospital Course:  4/6/17- S/P colonoscopy today with biposy of mass in the terminal ileum. General surgery consulted. Awaiting pathology.     Interval History: Colonoscopy done today with biopsy. General Surgery consult.     Review of Systems   Constitutional: Negative for appetite change, chills and fever.   HENT: Negative for trouble swallowing and voice change.    Eyes: Negative.    Respiratory: Negative for apnea, cough, choking, chest tightness, shortness of breath, wheezing and stridor.    Cardiovascular: Positive for chest pain. Negative for palpitations and leg swelling.   Gastrointestinal: Positive for abdominal pain, nausea and vomiting. Negative for blood in stool, constipation, diarrhea and rectal pain.   Endocrine: Negative.    Genitourinary: Negative.    Musculoskeletal: Negative.    Skin: Negative.    Allergic/Immunologic: Negative.    Neurological: Negative for dizziness, tremors, seizures, syncope, facial asymmetry, speech difficulty, weakness, light-headedness, numbness and headaches. "   Hematological: Negative.    Psychiatric/Behavioral: Negative.    All other systems reviewed and are negative.    Objective:     Vital Signs (Most Recent):  Temp: 97.8 °F (36.6 °C) (04/06/17 1100)  Pulse: 86 (04/06/17 1100)  Resp: 16 (04/06/17 1100)  BP: 125/80 (04/06/17 1100)  SpO2: 96 % (04/06/17 1100) Vital Signs (24h Range):  Temp:  [97.8 °F (36.6 °C)-98.8 °F (37.1 °C)] 97.8 °F (36.6 °C)  Pulse:  [75-98] 86  Resp:  [8-44] 16  SpO2:  [92 %-99 %] 96 %  BP: (120-181)/() 125/80     Weight: 82.6 kg (182 lb)  Body mass index is 31.24 kg/(m^2).    Intake/Output Summary (Last 24 hours) at 04/06/17 1237  Last data filed at 04/06/17 0927   Gross per 24 hour   Intake          3842.92 ml   Output                0 ml   Net          3842.92 ml      Physical Exam   Constitutional: She is oriented to person, place, and time. She appears well-developed.   HENT:   Head: Normocephalic and atraumatic.   Eyes: Conjunctivae and EOM are normal.   Neck: Normal range of motion. Neck supple.   Cardiovascular: Normal rate, regular rhythm, normal heart sounds and intact distal pulses.    No murmur heard.  Pulmonary/Chest: Effort normal and breath sounds normal. No respiratory distress. She has no wheezes.   Abdominal: Soft. Bowel sounds are normal. There is no tenderness.   Musculoskeletal: Normal range of motion.   Neurological: She is alert and oriented to person, place, and time.   Skin: Skin is warm and dry.   Psychiatric: She has a normal mood and affect. Her behavior is normal. Judgment and thought content normal.   Nursing note and vitals reviewed.      Significant Labs: All pertinent labs within the past 24 hours have been reviewed.    Significant Imaging:   Imaging Results         CT Abdomen Pelvis With Contrast (Final result) Result time:  04/05/17 11:18:03    Final result by Elieser Enriquez MD (04/05/17 11:18:03)    Impression:             6.9 x 7.0 x 8.4 cm soft tissue mass in the right lower quadrant centered at the  terminal ileum abutting the cecum.  Adjacent conglomerate adenopathy in the right lower quadrant mesentery.  Differential considerations include small bowel adenocarcinoma, exophytic colon cancer, gastrointestinal stromal tumor, or lymphoma.  Recommend endoscopy with biopsy.  There is no evidence of bowel obstruction or perforation.    Mild mucosal edema and mucosal hyperenhancement involving the gastric remnant.  Correlate with signs and symptoms of gastritis.    1.6 cm indeterminate left adrenal adenoma.  Metastasis not excluded.        Multifocal renal cortical thinning and cortical scarring.  All CT scans at this facility use dose modulation, iterative reconstruction and/or weight based dosing when appropriate to reduce radiation dose to as low as reasonably achievable.       Electronically signed by: ALONZO CHA MD  Date:     04/05/17  Time:    11:18     Narrative:    Exam: CT ABDOMEN PELVIS WITH CONTRAST    Technique: Axial CT imaging was performed through the abdomen and pelvis with  75cc  of intravenous contrast. Multiplanar reformats were performed and interpreted.    Clinical History:  Abdominal pain.  generalized abdominal pain      Comparison:  None     Findings:          Lung bases are clear.    The liver is normal in size and surface contour.  No focal hepatic lesions.  The gallbladder has been removed.  No biliary ductal dilatation.  The pancreas, spleen, and right adrenal gland are within normal limits.  There is a 1.6 cm left adrenal nodule.  Marked lobulation and cortical thinning of both kidneys.  Multifocal renal cortical scarring.  No hydronephrosis.     The patient is status post gastric bypass.  There is submucosal edema and mucosal hyperenhancement of the excluded gastric remnant.  No evidence of bowel obstruction.    In the right lower quadrant, there is a lobular soft tissue mass that measures 6.9 x 7.0 x 8.4 cm that abuts the mesenteric surface of the cecum centered at the terminal ileum.   Multiple enlarged adjacent ileocolic lymph nodes.  The conglomerate mona mass measures up to 4.1 x 2.9 cm.  Colonic diverticulosis.    Aortic atherosclerosis without evidence of aneurysm.     The urinary bladder is unremarkable. The uterus is unremarkable.  No free pelvic fluid.    No significant osseous abnormality is identified.  No suspicious osseous lesions.            X-Ray Chest AP Portable (Final result) Result time:  04/05/17 08:36:07    Final result by TASHA Kearns Sr., MD (04/05/17 08:36:07)    Impression:        1.  The lungs are clear.  2.  Surgical changes      Electronically signed by: TASHA KEARNS MD  Date:     04/05/17  Time:    08:36     Narrative:    One-view chest x-ray    Clinical History: Chest pain    Finding: A cardiac pacemaker is in place.  The leads appear to be intact.  The size of the heart is normal. The lungs are clear. There is no pneumothorax.  The costophrenic angles are sharp.  Surgical clips are projected over the left upper quadrant of the abdomen.                 Assessment/Plan:      * Lower abdominal pain  - Admit to Inpatient  - CT abdomen/pelvis revealed:  Soft tissue mass in the RLQ. Colonoscopy with biopsy done today.   - Gastroenterology following   - General Surgery consulted  - Clear liquid diet.   - Analgesics/Antiemetic prn        Essential hypertension  - Stable  - Continue home medications      Hyperlipidemia  - Continue Statin      Hypothyroidism (acquired)  - Obtain TSH  - Resume Levothyroxine      CAD, multiple vessel  - Continue ASA and Statin   - Will hold Plavix for tomorrow morning. Its possible if colonoscopy cannot obtain biopsy, General surgery will be consulted.      Chest pain  - Cardiology following  - 2D ECHO showed EF 40 % with diastolic dysfunction   - Serial troponins, Lipid panel, and TSH pendng  - Morphine prn  - Supplemental oxygen prn, keep O2 sats > 92%  - SL Nitro prn  - Continue ASA and Statin      Abnormal CT scan, gastrointestinal  tract        VTE Risk Mitigation         Ordered     Medium Risk of VTE  Once      04/05/17 1441     heparin (porcine) injection 5,000 Units  Every 8 hours     Route:  Subcutaneous        04/05/17 1231          Kareem Roberts NP  Department of Hospital Medicine   [facility]

## 2017-04-06 NOTE — PLAN OF CARE
Problem: Patient Care Overview  Goal: Plan of Care Review  Outcome: Ongoing (interventions implemented as appropriate)  Patient due to have colonoscopy in AM. Patient has been NPO since midnight. Alarms and audible. Will continue to monitor.

## 2017-04-06 NOTE — SUBJECTIVE & OBJECTIVE
Interval History: Colonoscopy done today with biopsy. General Surgery consult.     Review of Systems   Constitutional: Negative for appetite change, chills and fever.   HENT: Negative for trouble swallowing and voice change.    Eyes: Negative.    Respiratory: Negative for apnea, cough, choking, chest tightness, shortness of breath, wheezing and stridor.    Cardiovascular: Positive for chest pain. Negative for palpitations and leg swelling.   Gastrointestinal: Positive for abdominal pain, nausea and vomiting. Negative for blood in stool, constipation, diarrhea and rectal pain.   Endocrine: Negative.    Genitourinary: Negative.    Musculoskeletal: Negative.    Skin: Negative.    Allergic/Immunologic: Negative.    Neurological: Negative for dizziness, tremors, seizures, syncope, facial asymmetry, speech difficulty, weakness, light-headedness, numbness and headaches.   Hematological: Negative.    Psychiatric/Behavioral: Negative.    All other systems reviewed and are negative.    Objective:     Vital Signs (Most Recent):  Temp: 97.8 °F (36.6 °C) (04/06/17 1100)  Pulse: 86 (04/06/17 1100)  Resp: 16 (04/06/17 1100)  BP: 125/80 (04/06/17 1100)  SpO2: 96 % (04/06/17 1100) Vital Signs (24h Range):  Temp:  [97.8 °F (36.6 °C)-98.8 °F (37.1 °C)] 97.8 °F (36.6 °C)  Pulse:  [75-98] 86  Resp:  [8-44] 16  SpO2:  [92 %-99 %] 96 %  BP: (120-181)/() 125/80     Weight: 82.6 kg (182 lb)  Body mass index is 31.24 kg/(m^2).    Intake/Output Summary (Last 24 hours) at 04/06/17 1237  Last data filed at 04/06/17 0927   Gross per 24 hour   Intake          3842.92 ml   Output                0 ml   Net          3842.92 ml      Physical Exam   Constitutional: She is oriented to person, place, and time. She appears well-developed.   HENT:   Head: Normocephalic and atraumatic.   Eyes: Conjunctivae and EOM are normal.   Neck: Normal range of motion. Neck supple.   Cardiovascular: Normal rate, regular rhythm, normal heart sounds and intact  distal pulses.    No murmur heard.  Pulmonary/Chest: Effort normal and breath sounds normal. No respiratory distress. She has no wheezes.   Abdominal: Soft. Bowel sounds are normal. There is no tenderness.   Musculoskeletal: Normal range of motion.   Neurological: She is alert and oriented to person, place, and time.   Skin: Skin is warm and dry.   Psychiatric: She has a normal mood and affect. Her behavior is normal. Judgment and thought content normal.   Nursing note and vitals reviewed.      Significant Labs: All pertinent labs within the past 24 hours have been reviewed.    Significant Imaging:   Imaging Results         CT Abdomen Pelvis With Contrast (Final result) Result time:  04/05/17 11:18:03    Final result by Alonzo Enriquez MD (04/05/17 11:18:03)    Impression:             6.9 x 7.0 x 8.4 cm soft tissue mass in the right lower quadrant centered at the terminal ileum abutting the cecum.  Adjacent conglomerate adenopathy in the right lower quadrant mesentery.  Differential considerations include small bowel adenocarcinoma, exophytic colon cancer, gastrointestinal stromal tumor, or lymphoma.  Recommend endoscopy with biopsy.  There is no evidence of bowel obstruction or perforation.    Mild mucosal edema and mucosal hyperenhancement involving the gastric remnant.  Correlate with signs and symptoms of gastritis.    1.6 cm indeterminate left adrenal adenoma.  Metastasis not excluded.        Multifocal renal cortical thinning and cortical scarring.  All CT scans at this facility use dose modulation, iterative reconstruction and/or weight based dosing when appropriate to reduce radiation dose to as low as reasonably achievable.       Electronically signed by: ALONZO ENRIQUEZ MD  Date:     04/05/17  Time:    11:18     Narrative:    Exam: CT ABDOMEN PELVIS WITH CONTRAST    Technique: Axial CT imaging was performed through the abdomen and pelvis with  75cc  of intravenous contrast. Multiplanar reformats were performed  and interpreted.    Clinical History:  Abdominal pain.  generalized abdominal pain      Comparison:  None     Findings:          Lung bases are clear.    The liver is normal in size and surface contour.  No focal hepatic lesions.  The gallbladder has been removed.  No biliary ductal dilatation.  The pancreas, spleen, and right adrenal gland are within normal limits.  There is a 1.6 cm left adrenal nodule.  Marked lobulation and cortical thinning of both kidneys.  Multifocal renal cortical scarring.  No hydronephrosis.     The patient is status post gastric bypass.  There is submucosal edema and mucosal hyperenhancement of the excluded gastric remnant.  No evidence of bowel obstruction.    In the right lower quadrant, there is a lobular soft tissue mass that measures 6.9 x 7.0 x 8.4 cm that abuts the mesenteric surface of the cecum centered at the terminal ileum.  Multiple enlarged adjacent ileocolic lymph nodes.  The conglomerate mona mass measures up to 4.1 x 2.9 cm.  Colonic diverticulosis.    Aortic atherosclerosis without evidence of aneurysm.     The urinary bladder is unremarkable. The uterus is unremarkable.  No free pelvic fluid.    No significant osseous abnormality is identified.  No suspicious osseous lesions.            X-Ray Chest AP Portable (Final result) Result time:  04/05/17 08:36:07    Final result by TASHA Kearns Sr., MD (04/05/17 08:36:07)    Impression:        1.  The lungs are clear.  2.  Surgical changes      Electronically signed by: TASHA KEARNS MD  Date:     04/05/17  Time:    08:36     Narrative:    One-view chest x-ray    Clinical History: Chest pain    Finding: A cardiac pacemaker is in place.  The leads appear to be intact.  The size of the heart is normal. The lungs are clear. There is no pneumothorax.  The costophrenic angles are sharp.  Surgical clips are projected over the left upper quadrant of the abdomen.

## 2017-04-07 ENCOUNTER — SURGERY (OUTPATIENT)
Age: 77
End: 2017-04-07

## 2017-04-07 ENCOUNTER — ANESTHESIA (OUTPATIENT)
Dept: SURGERY | Facility: HOSPITAL | Age: 77
DRG: 330 | End: 2017-04-07
Payer: MEDICARE

## 2017-04-07 LAB
ALLENS TEST: ABNORMAL
ANION GAP SERPL CALC-SCNC: 11 MMOL/L
ANION GAP SERPL CALC-SCNC: 8 MMOL/L
BASOPHILS # BLD AUTO: 0.02 K/UL
BASOPHILS # BLD AUTO: 0.02 K/UL
BASOPHILS NFR BLD: 0.1 %
BASOPHILS NFR BLD: 0.3 %
BUN SERPL-MCNC: 13 MG/DL
BUN SERPL-MCNC: 13 MG/DL
CALCIUM SERPL-MCNC: 8.5 MG/DL
CALCIUM SERPL-MCNC: 9.1 MG/DL
CANCER AG125 SERPL-ACNC: 5 U/ML
CEA SERPL-MCNC: 3.4 NG/ML
CHLORIDE SERPL-SCNC: 106 MMOL/L
CHLORIDE SERPL-SCNC: 106 MMOL/L
CO2 SERPL-SCNC: 21 MMOL/L
CO2 SERPL-SCNC: 26 MMOL/L
CREAT SERPL-MCNC: 1 MG/DL
CREAT SERPL-MCNC: 1 MG/DL
DELSYS: ABNORMAL
DIFFERENTIAL METHOD: ABNORMAL
DIFFERENTIAL METHOD: ABNORMAL
EOSINOPHIL # BLD AUTO: 0.1 K/UL
EOSINOPHIL # BLD AUTO: 0.1 K/UL
EOSINOPHIL NFR BLD: 0.5 %
EOSINOPHIL NFR BLD: 1.9 %
ERYTHROCYTE [DISTWIDTH] IN BLOOD BY AUTOMATED COUNT: 16.5 %
ERYTHROCYTE [DISTWIDTH] IN BLOOD BY AUTOMATED COUNT: 16.7 %
ERYTHROCYTE [SEDIMENTATION RATE] IN BLOOD BY WESTERGREN METHOD: 8 MM/H
EST. GFR  (AFRICAN AMERICAN): >60 ML/MIN/1.73 M^2
EST. GFR  (AFRICAN AMERICAN): >60 ML/MIN/1.73 M^2
EST. GFR  (NON AFRICAN AMERICAN): 55 ML/MIN/1.73 M^2
EST. GFR  (NON AFRICAN AMERICAN): 55 ML/MIN/1.73 M^2
FIO2: 96
GLUCOSE SERPL-MCNC: 111 MG/DL (ref 70–110)
GLUCOSE SERPL-MCNC: 124 MG/DL
GLUCOSE SERPL-MCNC: 94 MG/DL
HCO3 UR-SCNC: 27.5 MMOL/L (ref 24–28)
HCT VFR BLD AUTO: 33.6 %
HCT VFR BLD AUTO: 33.6 %
HCT VFR BLD CALC: 35 %PCV (ref 36–54)
HGB BLD-MCNC: 10.5 G/DL
HGB BLD-MCNC: 10.7 G/DL
LYMPHOCYTES # BLD AUTO: 2.2 K/UL
LYMPHOCYTES # BLD AUTO: 4.8 K/UL
LYMPHOCYTES NFR BLD: 25.3 %
LYMPHOCYTES NFR BLD: 34.3 %
MAGNESIUM SERPL-MCNC: 1.7 MG/DL
MCH RBC QN AUTO: 28.2 PG
MCH RBC QN AUTO: 28.4 PG
MCHC RBC AUTO-ENTMCNC: 31.3 %
MCHC RBC AUTO-ENTMCNC: 31.8 %
MCV RBC AUTO: 89 FL
MCV RBC AUTO: 90 FL
MODE: ABNORMAL
MONOCYTES # BLD AUTO: 0.7 K/UL
MONOCYTES # BLD AUTO: 1 K/UL
MONOCYTES NFR BLD: 11.3 %
MONOCYTES NFR BLD: 5.5 %
NEUTROPHILS # BLD AUTO: 13.1 K/UL
NEUTROPHILS # BLD AUTO: 3.3 K/UL
NEUTROPHILS NFR BLD: 52.2 %
NEUTROPHILS NFR BLD: 68.6 %
PCO2 BLDA: 42.6 MMHG (ref 35–45)
PH SMN: 7.42 [PH] (ref 7.35–7.45)
PLATELET # BLD AUTO: 232 K/UL
PLATELET # BLD AUTO: 250 K/UL
PMV BLD AUTO: 10.1 FL
PMV BLD AUTO: 10.4 FL
PO2 BLDA: 436 MMHG (ref 80–100)
POC BE: 3 MMOL/L
POC IONIZED CALCIUM: 1.22 MMOL/L (ref 1.06–1.42)
POC SATURATED O2: 100 % (ref 95–100)
POTASSIUM BLD-SCNC: 3.5 MMOL/L (ref 3.5–5.1)
POTASSIUM SERPL-SCNC: 3.9 MMOL/L
POTASSIUM SERPL-SCNC: 4.3 MMOL/L
RBC # BLD AUTO: 3.73 M/UL
RBC # BLD AUTO: 3.77 M/UL
SAMPLE: ABNORMAL
SITE: ABNORMAL
SODIUM BLD-SCNC: 133 MMOL/L (ref 136–145)
SODIUM SERPL-SCNC: 138 MMOL/L
SODIUM SERPL-SCNC: 140 MMOL/L
VT: 500
WBC # BLD AUTO: 19.07 K/UL
WBC # BLD AUTO: 6.39 K/UL

## 2017-04-07 PROCEDURE — 36600 WITHDRAWAL OF ARTERIAL BLOOD: CPT

## 2017-04-07 PROCEDURE — 37000008 HC ANESTHESIA 1ST 15 MINUTES: Performed by: SURGERY

## 2017-04-07 PROCEDURE — 82803 BLOOD GASES ANY COMBINATION: CPT

## 2017-04-07 PROCEDURE — C9113 INJ PANTOPRAZOLE SODIUM, VIA: HCPCS | Performed by: EMERGENCY MEDICINE

## 2017-04-07 PROCEDURE — 88307 TISSUE EXAM BY PATHOLOGIST: CPT | Mod: 26,,, | Performed by: PATHOLOGY

## 2017-04-07 PROCEDURE — 44160 REMOVAL OF COLON: CPT | Mod: ,,, | Performed by: SURGERY

## 2017-04-07 PROCEDURE — 99900035 HC TECH TIME PER 15 MIN (STAT)

## 2017-04-07 PROCEDURE — 63600175 PHARM REV CODE 636 W HCPCS: Performed by: EMERGENCY MEDICINE

## 2017-04-07 PROCEDURE — 63600175 PHARM REV CODE 636 W HCPCS: Performed by: SURGERY

## 2017-04-07 PROCEDURE — 25000003 PHARM REV CODE 250: Performed by: SURGERY

## 2017-04-07 PROCEDURE — 37000009 HC ANESTHESIA EA ADD 15 MINS: Performed by: SURGERY

## 2017-04-07 PROCEDURE — 94761 N-INVAS EAR/PLS OXIMETRY MLT: CPT

## 2017-04-07 PROCEDURE — 71000033 HC RECOVERY, INTIAL HOUR: Performed by: SURGERY

## 2017-04-07 PROCEDURE — 85014 HEMATOCRIT: CPT

## 2017-04-07 PROCEDURE — 44160 REMOVAL OF COLON: CPT | Mod: 80,,, | Performed by: SURGERY

## 2017-04-07 PROCEDURE — 25000003 PHARM REV CODE 250: Performed by: EMERGENCY MEDICINE

## 2017-04-07 PROCEDURE — 84295 ASSAY OF SERUM SODIUM: CPT

## 2017-04-07 PROCEDURE — 25000003 PHARM REV CODE 250: Performed by: NURSE PRACTITIONER

## 2017-04-07 PROCEDURE — 0DNA0ZZ RELEASE JEJUNUM, OPEN APPROACH: ICD-10-PCS | Performed by: SURGERY

## 2017-04-07 PROCEDURE — 0DTF0ZZ RESECTION OF RIGHT LARGE INTESTINE, OPEN APPROACH: ICD-10-PCS | Performed by: SURGERY

## 2017-04-07 PROCEDURE — 80048 BASIC METABOLIC PNL TOTAL CA: CPT | Mod: 91

## 2017-04-07 PROCEDURE — 21400001 HC TELEMETRY ROOM

## 2017-04-07 PROCEDURE — 63600175 PHARM REV CODE 636 W HCPCS: Performed by: ANESTHESIOLOGY

## 2017-04-07 PROCEDURE — 36415 COLL VENOUS BLD VENIPUNCTURE: CPT

## 2017-04-07 PROCEDURE — 36000709 HC OR TIME LEV III EA ADD 15 MIN: Performed by: SURGERY

## 2017-04-07 PROCEDURE — 88341 IMHCHEM/IMCYTCHM EA ADD ANTB: CPT | Performed by: PATHOLOGY

## 2017-04-07 PROCEDURE — 63600175 PHARM REV CODE 636 W HCPCS: Performed by: NURSE ANESTHETIST, CERTIFIED REGISTERED

## 2017-04-07 PROCEDURE — 25000003 PHARM REV CODE 250: Performed by: NURSE ANESTHETIST, CERTIFIED REGISTERED

## 2017-04-07 PROCEDURE — P9045 ALBUMIN (HUMAN), 5%, 250 ML: HCPCS | Performed by: NURSE ANESTHETIST, CERTIFIED REGISTERED

## 2017-04-07 PROCEDURE — 0DTA0ZZ RESECTION OF JEJUNUM, OPEN APPROACH: ICD-10-PCS | Performed by: SURGERY

## 2017-04-07 PROCEDURE — 88341 IMHCHEM/IMCYTCHM EA ADD ANTB: CPT | Mod: 26,59,, | Performed by: PATHOLOGY

## 2017-04-07 PROCEDURE — 63600175 PHARM REV CODE 636 W HCPCS: Performed by: NURSE PRACTITIONER

## 2017-04-07 PROCEDURE — 88342 IMHCHEM/IMCYTCHM 1ST ANTB: CPT | Mod: 26,,, | Performed by: PATHOLOGY

## 2017-04-07 PROCEDURE — 71000039 HC RECOVERY, EACH ADD'L HOUR: Performed by: SURGERY

## 2017-04-07 PROCEDURE — 83735 ASSAY OF MAGNESIUM: CPT

## 2017-04-07 PROCEDURE — 82330 ASSAY OF CALCIUM: CPT

## 2017-04-07 PROCEDURE — 84132 ASSAY OF SERUM POTASSIUM: CPT

## 2017-04-07 PROCEDURE — 88341 IMHCHEM/IMCYTCHM EA ADD ANTB: CPT | Mod: 26,,, | Performed by: PATHOLOGY

## 2017-04-07 PROCEDURE — 80048 BASIC METABOLIC PNL TOTAL CA: CPT

## 2017-04-07 PROCEDURE — C1765 ADHESION BARRIER: HCPCS | Performed by: SURGERY

## 2017-04-07 PROCEDURE — 85025 COMPLETE CBC W/AUTO DIFF WBC: CPT

## 2017-04-07 PROCEDURE — 36000708 HC OR TIME LEV III 1ST 15 MIN: Performed by: SURGERY

## 2017-04-07 PROCEDURE — 27201423 OPTIME MED/SURG SUP & DEVICES STERILE SUPPLY: Performed by: SURGERY

## 2017-04-07 DEVICE — BARRIER INTERCEED 3X4 ST DIS: Type: IMPLANTABLE DEVICE | Site: ABDOMEN | Status: FUNCTIONAL

## 2017-04-07 RX ORDER — ACETAMINOPHEN 10 MG/ML
INJECTION, SOLUTION INTRAVENOUS
Status: DISCONTINUED | OUTPATIENT
Start: 2017-04-07 | End: 2017-04-07

## 2017-04-07 RX ORDER — NEOSTIGMINE METHYLSULFATE 1 MG/ML
INJECTION, SOLUTION INTRAVENOUS
Status: DISCONTINUED | OUTPATIENT
Start: 2017-04-07 | End: 2017-04-07

## 2017-04-07 RX ORDER — HYDROMORPHONE HCL IN 0.9% NACL 6 MG/30 ML
PATIENT CONTROLLED ANALGESIA SYRINGE INTRAVENOUS CONTINUOUS
Status: DISCONTINUED | OUTPATIENT
Start: 2017-04-07 | End: 2017-04-07

## 2017-04-07 RX ORDER — ONDANSETRON 2 MG/ML
INJECTION INTRAMUSCULAR; INTRAVENOUS
Status: DISCONTINUED | OUTPATIENT
Start: 2017-04-07 | End: 2017-04-07

## 2017-04-07 RX ORDER — ROCURONIUM BROMIDE 10 MG/ML
INJECTION, SOLUTION INTRAVENOUS
Status: DISCONTINUED | OUTPATIENT
Start: 2017-04-07 | End: 2017-04-07

## 2017-04-07 RX ORDER — ETOMIDATE 2 MG/ML
INJECTION INTRAVENOUS
Status: DISCONTINUED | OUTPATIENT
Start: 2017-04-07 | End: 2017-04-07

## 2017-04-07 RX ORDER — SUCCINYLCHOLINE CHLORIDE 20 MG/ML
INJECTION INTRAMUSCULAR; INTRAVENOUS
Status: DISCONTINUED | OUTPATIENT
Start: 2017-04-07 | End: 2017-04-07

## 2017-04-07 RX ORDER — HYDRALAZINE HYDROCHLORIDE 20 MG/ML
10 INJECTION INTRAMUSCULAR; INTRAVENOUS EVERY 8 HOURS PRN
Status: DISCONTINUED | OUTPATIENT
Start: 2017-04-07 | End: 2017-04-14 | Stop reason: HOSPADM

## 2017-04-07 RX ORDER — SODIUM CHLORIDE, SODIUM LACTATE, POTASSIUM CHLORIDE, CALCIUM CHLORIDE 600; 310; 30; 20 MG/100ML; MG/100ML; MG/100ML; MG/100ML
INJECTION, SOLUTION INTRAVENOUS CONTINUOUS PRN
Status: DISCONTINUED | OUTPATIENT
Start: 2017-04-07 | End: 2017-04-07

## 2017-04-07 RX ORDER — METRONIDAZOLE 500 MG/100ML
500 INJECTION, SOLUTION INTRAVENOUS
Status: COMPLETED | OUTPATIENT
Start: 2017-04-07 | End: 2017-04-08

## 2017-04-07 RX ORDER — FENTANYL CITRATE 50 UG/ML
INJECTION, SOLUTION INTRAMUSCULAR; INTRAVENOUS
Status: DISCONTINUED | OUTPATIENT
Start: 2017-04-07 | End: 2017-04-07

## 2017-04-07 RX ORDER — LIDOCAINE HYDROCHLORIDE 10 MG/ML
INJECTION INFILTRATION; PERINEURAL
Status: DISCONTINUED | OUTPATIENT
Start: 2017-04-07 | End: 2017-04-07

## 2017-04-07 RX ORDER — NALOXONE HCL 0.4 MG/ML
0.02 VIAL (ML) INJECTION
Status: DISCONTINUED | OUTPATIENT
Start: 2017-04-07 | End: 2017-04-08

## 2017-04-07 RX ORDER — SODIUM CHLORIDE 9 MG/ML
3 INJECTION, SOLUTION INTRAMUSCULAR; INTRAVENOUS; SUBCUTANEOUS EVERY 8 HOURS
Status: DISCONTINUED | OUTPATIENT
Start: 2017-04-07 | End: 2017-04-07 | Stop reason: HOSPADM

## 2017-04-07 RX ORDER — PHENYLEPHRINE HYDROCHLORIDE 10 MG/ML
INJECTION INTRAVENOUS
Status: DISCONTINUED | OUTPATIENT
Start: 2017-04-07 | End: 2017-04-07

## 2017-04-07 RX ORDER — MORPHINE SULFATE 10 MG/ML
2 INJECTION INTRAMUSCULAR; INTRAVENOUS; SUBCUTANEOUS EVERY 5 MIN PRN
Status: DISCONTINUED | OUTPATIENT
Start: 2017-04-07 | End: 2017-04-07 | Stop reason: HOSPADM

## 2017-04-07 RX ORDER — PROPOFOL 10 MG/ML
VIAL (ML) INTRAVENOUS
Status: DISCONTINUED | OUTPATIENT
Start: 2017-04-07 | End: 2017-04-07

## 2017-04-07 RX ORDER — NALOXONE HCL 0.4 MG/ML
0.02 VIAL (ML) INJECTION
Status: DISCONTINUED | OUTPATIENT
Start: 2017-04-07 | End: 2017-04-07

## 2017-04-07 RX ORDER — ACETAMINOPHEN 10 MG/ML
1000 INJECTION, SOLUTION INTRAVENOUS EVERY 8 HOURS
Status: DISCONTINUED | OUTPATIENT
Start: 2017-04-07 | End: 2017-04-08

## 2017-04-07 RX ORDER — SODIUM CHLORIDE, SODIUM LACTATE, POTASSIUM CHLORIDE, CALCIUM CHLORIDE 600; 310; 30; 20 MG/100ML; MG/100ML; MG/100ML; MG/100ML
INJECTION, SOLUTION INTRAVENOUS CONTINUOUS
Status: DISCONTINUED | OUTPATIENT
Start: 2017-04-07 | End: 2017-04-08

## 2017-04-07 RX ORDER — LIDOCAINE HYDROCHLORIDE 10 MG/ML
1 INJECTION, SOLUTION EPIDURAL; INFILTRATION; INTRACAUDAL; PERINEURAL ONCE
Status: DISCONTINUED | OUTPATIENT
Start: 2017-04-07 | End: 2017-04-07 | Stop reason: HOSPADM

## 2017-04-07 RX ORDER — HEPARIN SODIUM 5000 [USP'U]/ML
5000 INJECTION, SOLUTION INTRAVENOUS; SUBCUTANEOUS EVERY 8 HOURS
Status: DISCONTINUED | OUTPATIENT
Start: 2017-04-08 | End: 2017-04-14 | Stop reason: HOSPADM

## 2017-04-07 RX ORDER — BUPIVACAINE HYDROCHLORIDE 2.5 MG/ML
INJECTION, SOLUTION EPIDURAL; INFILTRATION; INTRACAUDAL
Status: DISCONTINUED | OUTPATIENT
Start: 2017-04-07 | End: 2017-04-07 | Stop reason: HOSPADM

## 2017-04-07 RX ORDER — ONDANSETRON 2 MG/ML
4 INJECTION INTRAMUSCULAR; INTRAVENOUS ONCE
Status: COMPLETED | OUTPATIENT
Start: 2017-04-07 | End: 2017-04-07

## 2017-04-07 RX ORDER — MORPHINE SULFATE 4 MG/ML
4 INJECTION, SOLUTION INTRAMUSCULAR; INTRAVENOUS ONCE
Status: DISCONTINUED | OUTPATIENT
Start: 2017-04-07 | End: 2017-04-07 | Stop reason: ALTCHOICE

## 2017-04-07 RX ORDER — MORPHINE SULFATE 1 MG/ML
INJECTION INTRAVENOUS CONTINUOUS
Status: DISCONTINUED | OUTPATIENT
Start: 2017-04-07 | End: 2017-04-08

## 2017-04-07 RX ORDER — SODIUM CHLORIDE, SODIUM LACTATE, POTASSIUM CHLORIDE, CALCIUM CHLORIDE 600; 310; 30; 20 MG/100ML; MG/100ML; MG/100ML; MG/100ML
INJECTION, SOLUTION INTRAVENOUS CONTINUOUS
Status: DISCONTINUED | OUTPATIENT
Start: 2017-04-07 | End: 2017-04-07

## 2017-04-07 RX ORDER — SODIUM CHLORIDE 9 MG/ML
3 INJECTION, SOLUTION INTRAMUSCULAR; INTRAVENOUS; SUBCUTANEOUS
Status: DISCONTINUED | OUTPATIENT
Start: 2017-04-07 | End: 2017-04-07 | Stop reason: HOSPADM

## 2017-04-07 RX ORDER — GLYCOPYRROLATE 0.2 MG/ML
INJECTION INTRAMUSCULAR; INTRAVENOUS
Status: DISCONTINUED | OUTPATIENT
Start: 2017-04-07 | End: 2017-04-07

## 2017-04-07 RX ORDER — METRONIDAZOLE 500 MG/100ML
500 INJECTION, SOLUTION INTRAVENOUS
Status: COMPLETED | OUTPATIENT
Start: 2017-04-07 | End: 2017-04-07

## 2017-04-07 RX ORDER — ALBUMIN HUMAN 50 G/1000ML
SOLUTION INTRAVENOUS CONTINUOUS PRN
Status: DISCONTINUED | OUTPATIENT
Start: 2017-04-07 | End: 2017-04-07

## 2017-04-07 RX ORDER — MEPERIDINE HYDROCHLORIDE 50 MG/ML
12.5 INJECTION INTRAMUSCULAR; INTRAVENOUS; SUBCUTANEOUS ONCE AS NEEDED
Status: DISCONTINUED | OUTPATIENT
Start: 2017-04-07 | End: 2017-04-07 | Stop reason: HOSPADM

## 2017-04-07 RX ADMIN — PANTOPRAZOLE SODIUM 40 MG: 40 INJECTION, POWDER, FOR SOLUTION INTRAVENOUS at 05:04

## 2017-04-07 RX ADMIN — ONDANSETRON 4 MG: 2 INJECTION INTRAMUSCULAR; INTRAVENOUS at 03:04

## 2017-04-07 RX ADMIN — ONDANSETRON 4 MG: 2 INJECTION, SOLUTION INTRAMUSCULAR; INTRAVENOUS at 02:04

## 2017-04-07 RX ADMIN — PROPOFOL 30 MG: 10 INJECTION, EMULSION INTRAVENOUS at 11:04

## 2017-04-07 RX ADMIN — MORPHINE SULFATE 2 MG: 10 INJECTION, SOLUTION INTRAMUSCULAR; INTRAVENOUS at 02:04

## 2017-04-07 RX ADMIN — LIDOCAINE HYDROCHLORIDE 80 MG: 10 INJECTION, SOLUTION INFILTRATION; PERINEURAL at 11:04

## 2017-04-07 RX ADMIN — HYDROCODONE BITARTRATE AND ACETAMINOPHEN 1 TABLET: 10; 325 TABLET ORAL at 08:04

## 2017-04-07 RX ADMIN — FENTANYL CITRATE 50 MCG: 50 INJECTION, SOLUTION INTRAMUSCULAR; INTRAVENOUS at 11:04

## 2017-04-07 RX ADMIN — SODIUM CHLORIDE, SODIUM LACTATE, POTASSIUM CHLORIDE, AND CALCIUM CHLORIDE: 600; 310; 30; 20 INJECTION, SOLUTION INTRAVENOUS at 11:04

## 2017-04-07 RX ADMIN — MORPHINE SULFATE: 30 INJECTION, SOLUTION INTRAVENOUS; SUBCUTANEOUS at 04:04

## 2017-04-07 RX ADMIN — PHENYLEPHRINE HYDROCHLORIDE 100 MCG: 10 INJECTION INTRAVENOUS at 01:04

## 2017-04-07 RX ADMIN — SODIUM CHLORIDE: 0.9 INJECTION, SOLUTION INTRAVENOUS at 12:04

## 2017-04-07 RX ADMIN — ONDANSETRON 4 MG: 2 INJECTION INTRAMUSCULAR; INTRAVENOUS at 07:04

## 2017-04-07 RX ADMIN — METRONIDAZOLE 500 MG: 500 INJECTION, SOLUTION INTRAVENOUS at 06:04

## 2017-04-07 RX ADMIN — ETOMIDATE 20 MG: 2 INJECTION, SOLUTION INTRAVENOUS at 11:04

## 2017-04-07 RX ADMIN — ROCURONIUM BROMIDE 5 MG: 10 INJECTION, SOLUTION INTRAVENOUS at 11:04

## 2017-04-07 RX ADMIN — SUCCINYLCHOLINE CHLORIDE 120 MG: 20 INJECTION, SOLUTION INTRAMUSCULAR; INTRAVENOUS at 11:04

## 2017-04-07 RX ADMIN — PHENYLEPHRINE HYDROCHLORIDE 200 MCG: 10 INJECTION INTRAVENOUS at 11:04

## 2017-04-07 RX ADMIN — MORPHINE SULFATE 4 MG: 4 INJECTION, SOLUTION INTRAMUSCULAR; INTRAVENOUS at 07:04

## 2017-04-07 RX ADMIN — SODIUM CHLORIDE, SODIUM LACTATE, POTASSIUM CHLORIDE, AND CALCIUM CHLORIDE: .6; .31; .03; .02 INJECTION, SOLUTION INTRAVENOUS at 04:04

## 2017-04-07 RX ADMIN — ROCURONIUM BROMIDE 45 MG: 10 INJECTION, SOLUTION INTRAVENOUS at 11:04

## 2017-04-07 RX ADMIN — FENTANYL CITRATE 100 MCG: 50 INJECTION, SOLUTION INTRAMUSCULAR; INTRAVENOUS at 11:04

## 2017-04-07 RX ADMIN — BUPIVACAINE HYDROCHLORIDE 30 ML: 2.5 INJECTION, SOLUTION EPIDURAL; INFILTRATION; INTRACAUDAL; PERINEURAL at 11:04

## 2017-04-07 RX ADMIN — METRONIDAZOLE 500 MG: 500 SOLUTION INTRAVENOUS at 11:04

## 2017-04-07 RX ADMIN — CEFTRIAXONE 2 G: 2 INJECTION, SOLUTION INTRAVENOUS at 11:04

## 2017-04-07 RX ADMIN — MORPHINE SULFATE 4 MG: 4 INJECTION, SOLUTION INTRAMUSCULAR; INTRAVENOUS at 03:04

## 2017-04-07 RX ADMIN — DIPHENHYDRAMINE HYDROCHLORIDE 12.5 MG: 50 INJECTION, SOLUTION INTRAMUSCULAR; INTRAVENOUS at 03:04

## 2017-04-07 RX ADMIN — ACETAMINOPHEN 1000 MG: 10 INJECTION, SOLUTION INTRAVENOUS at 01:04

## 2017-04-07 RX ADMIN — BUPIVACAINE 266 MG: 13.3 INJECTION, SUSPENSION, LIPOSOMAL INFILTRATION at 12:04

## 2017-04-07 RX ADMIN — GLYCOPYRROLATE 0.8 MG: 0.2 INJECTION, SOLUTION INTRAMUSCULAR; INTRAVENOUS at 02:04

## 2017-04-07 RX ADMIN — NEOSTIGMINE METHYLSULFATE 5 MG: 1 INJECTION INTRAVENOUS at 02:04

## 2017-04-07 RX ADMIN — ALBUMIN (HUMAN): 12.5 SOLUTION INTRAVENOUS at 01:04

## 2017-04-07 RX ADMIN — ACETAMINOPHEN 1000 MG: 10 INJECTION, SOLUTION INTRAVENOUS at 09:04

## 2017-04-07 NOTE — SUBJECTIVE & OBJECTIVE
Interval History: No acute issues overnight. Plan for ex lap today per General Surgery    Review of Systems   Constitutional: Negative for appetite change, chills and fever.   HENT: Negative for trouble swallowing and voice change.    Eyes: Negative.    Respiratory: Negative for apnea, cough, choking, chest tightness, shortness of breath, wheezing and stridor.    Cardiovascular: Positive for chest pain. Negative for palpitations and leg swelling.   Gastrointestinal: Positive for abdominal pain, nausea and vomiting. Negative for blood in stool, constipation, diarrhea and rectal pain.   Endocrine: Negative.    Genitourinary: Negative.    Musculoskeletal: Negative.    Skin: Negative.    Allergic/Immunologic: Negative.    Neurological: Negative for dizziness, tremors, seizures, syncope, facial asymmetry, speech difficulty, weakness, light-headedness, numbness and headaches.   Hematological: Negative.    Psychiatric/Behavioral: Negative.    All other systems reviewed and are negative.    Objective:     Vital Signs (Most Recent):  Temp: 97.9 °F (36.6 °C) (04/07/17 0742)  Pulse: 71 (04/07/17 1035)  Resp: 19 (04/07/17 0742)  BP: (!) 161/85 (04/07/17 0742)  SpO2: 95 % (04/07/17 0940) Vital Signs (24h Range):  Temp:  [97.8 °F (36.6 °C)-98.1 °F (36.7 °C)] 97.9 °F (36.6 °C)  Pulse:  [70-84] 71  Resp:  [16-19] 19  SpO2:  [94 %-95 %] 95 %  BP: (139-161)/(75-85) 161/85     Weight: 82.6 kg (182 lb)  Body mass index is 31.24 kg/(m^2).    Intake/Output Summary (Last 24 hours) at 04/07/17 1140  Last data filed at 04/07/17 0537   Gross per 24 hour   Intake          2331.25 ml   Output              200 ml   Net          2131.25 ml      Physical Exam   Constitutional: She is oriented to person, place, and time. She appears well-developed.   HENT:   Head: Normocephalic and atraumatic.   Eyes: Conjunctivae and EOM are normal.   Neck: Normal range of motion. Neck supple.   Cardiovascular: Normal rate, regular rhythm, normal heart sounds and  intact distal pulses.    No murmur heard.  Pulmonary/Chest: Effort normal and breath sounds normal. No respiratory distress. She has no wheezes.   Abdominal: Soft. Bowel sounds are normal. There is no tenderness.   Musculoskeletal: Normal range of motion.   Neurological: She is alert and oriented to person, place, and time.   Skin: Skin is warm and dry.   Psychiatric: She has a normal mood and affect. Her behavior is normal. Judgment and thought content normal.   Nursing note and vitals reviewed.      Significant Labs: All pertinent labs within the past 24 hours have been reviewed.    Significant Imaging:   Imaging Results         CT Abdomen Pelvis With Contrast (Final result) Result time:  04/05/17 11:18:03    Final result by Alonzo Enriquez MD (04/05/17 11:18:03)    Impression:             6.9 x 7.0 x 8.4 cm soft tissue mass in the right lower quadrant centered at the terminal ileum abutting the cecum.  Adjacent conglomerate adenopathy in the right lower quadrant mesentery.  Differential considerations include small bowel adenocarcinoma, exophytic colon cancer, gastrointestinal stromal tumor, or lymphoma.  Recommend endoscopy with biopsy.  There is no evidence of bowel obstruction or perforation.    Mild mucosal edema and mucosal hyperenhancement involving the gastric remnant.  Correlate with signs and symptoms of gastritis.    1.6 cm indeterminate left adrenal adenoma.  Metastasis not excluded.        Multifocal renal cortical thinning and cortical scarring.  All CT scans at this facility use dose modulation, iterative reconstruction and/or weight based dosing when appropriate to reduce radiation dose to as low as reasonably achievable.       Electronically signed by: ALONZO ENRIQUEZ MD  Date:     04/05/17  Time:    11:18     Narrative:    Exam: CT ABDOMEN PELVIS WITH CONTRAST    Technique: Axial CT imaging was performed through the abdomen and pelvis with  75cc  of intravenous contrast. Multiplanar reformats were  performed and interpreted.    Clinical History:  Abdominal pain.  generalized abdominal pain      Comparison:  None     Findings:          Lung bases are clear.    The liver is normal in size and surface contour.  No focal hepatic lesions.  The gallbladder has been removed.  No biliary ductal dilatation.  The pancreas, spleen, and right adrenal gland are within normal limits.  There is a 1.6 cm left adrenal nodule.  Marked lobulation and cortical thinning of both kidneys.  Multifocal renal cortical scarring.  No hydronephrosis.     The patient is status post gastric bypass.  There is submucosal edema and mucosal hyperenhancement of the excluded gastric remnant.  No evidence of bowel obstruction.    In the right lower quadrant, there is a lobular soft tissue mass that measures 6.9 x 7.0 x 8.4 cm that abuts the mesenteric surface of the cecum centered at the terminal ileum.  Multiple enlarged adjacent ileocolic lymph nodes.  The conglomerate mona mass measures up to 4.1 x 2.9 cm.  Colonic diverticulosis.    Aortic atherosclerosis without evidence of aneurysm.     The urinary bladder is unremarkable. The uterus is unremarkable.  No free pelvic fluid.    No significant osseous abnormality is identified.  No suspicious osseous lesions.            X-Ray Chest AP Portable (Final result) Result time:  04/05/17 08:36:07    Final result by TASHA Kearns Sr., MD (04/05/17 08:36:07)    Impression:        1.  The lungs are clear.  2.  Surgical changes      Electronically signed by: TASHA KEARNS MD  Date:     04/05/17  Time:    08:36     Narrative:    One-view chest x-ray    Clinical History: Chest pain    Finding: A cardiac pacemaker is in place.  The leads appear to be intact.  The size of the heart is normal. The lungs are clear. There is no pneumothorax.  The costophrenic angles are sharp.  Surgical clips are projected over the left upper quadrant of the abdomen.

## 2017-04-07 NOTE — ASSESSMENT & PLAN NOTE
- Admit to Inpatient  - CT abdomen/pelvis revealed:  Soft tissue mass in the RLQ. Colonoscopy with biopsy done today.   - Gastroenterology following   - General Surgery consulted, plans for exploratory laparotomy likely ileocectomy/right hemicolectomy  - Analgesics/Antiemetic prn

## 2017-04-07 NOTE — TRANSFER OF CARE
"Anesthesia Transfer of Care Note    Patient: Violet Swenson    Procedure(s) Performed: Procedure(s) (LRB):  EXPLORATORY-LAPAROTOMY (N/A)  COLECTOMY-PARTIAL (N/A)  LYSIS-ADHESION (N/A)    Patient location: PACU    Anesthesia Type: general    Transport from OR: Transported from OR on room air with adequate spontaneous ventilation    Post pain: adequate analgesia    Post assessment: no apparent anesthetic complications    Post vital signs: stable    Level of consciousness: awake    Nausea/Vomiting: no nausea/vomiting    Complications: none          Last vitals:   Visit Vitals    BP (!) 161/85    Pulse 71    Temp 36.6 °C (97.9 °F) (Oral)    Resp 19    Ht 5' 4" (1.626 m)    Wt 82.6 kg (182 lb)    SpO2 95%    Breastfeeding No    BMI 31.24 kg/m2     "

## 2017-04-07 NOTE — PLAN OF CARE
Problem: Patient Care Overview  Goal: Plan of Care Review  Outcome: Ongoing (interventions implemented as appropriate)  Pt remained afebrile throughout this shift.   IV fluids administered per order.   Pt remained free of falls this shift.  NPO for surgery this am.   Plan of care reviewed. Patient/daughter verbalizes understanding.   Pain meds administered as ordered.   Pt moving/turning independently/assist.  Patient SR on monitor.   Bed low, side rails up x 2, wheels locked, call light in reach.   Patient instructed to call for assistance.   Will continue to monitor.

## 2017-04-07 NOTE — ASSESSMENT & PLAN NOTE
Partially obstructing mass of terminal ileum  OR today for exploratory laparotomy likely ileocectomy/right hemicolectomy  NPO   Discussed findings, procedure and risks with patient. She is agreeable to procedure.

## 2017-04-07 NOTE — ANESTHESIA PREPROCEDURE EVALUATION
04/07/2017  Violet Swenson is a 77 y.o., female.    [facility] Anesthesia Evaluation    I have reviewed the Patient Summary Reports.     I have reviewed the Medications.     Review of Systems  Anesthesia Hx:  No problems with previous Anesthesia  History of prior surgery of interest to airway management or planning: Denies Family Hx of Anesthesia complications.   Denies Personal Hx of Anesthesia complications.   Social:  Non-Smoker    Cardiovascular:   Pacemaker Hypertension CAD  CABG/stent  hyperlipidemia ECG has been reviewed. Cardiac stent 2010; PPM v-paced   Echo 4/5/2017  CONCLUSIONS     1 - Moderate left atrial enlargement.     2 - Concentric hypertrophy.     3 - Mildly to moderately depressed left ventricular systolic function (EF 40-45%). RWMA noted.    4 - Left ventricular diastolic dysfunction.     5 - Normal right ventricular systolic function .     6 - The estimated PA systolic pressure is 29 mmHg.     7 - Mild mitral regurgitation.     Elevated troponin, reported chest tightness, seen by cards (4/5)     Pulmonary:   CXR 4/5/2017- lungs clear   Renal/:   Chronic Renal Disease, CRI    Hepatic/GI:   GERD few day history of worsening right lower quadrant abdominal pain, nausea/emesis.  CT scan concerning for ileocecal mass and subsequent colonoscopy showed partially obstructing mass of the terminal ileum. ; previous gastric bypass   Musculoskeletal:   gout   Endocrine:   Hypothyroidism    Psych:   depression          Physical Exam  General:  Obesity    Airway/Jaw/Neck:  Airway Findings: Mouth Opening: Normal Tongue: Normal  General Airway Assessment: Adult  Mallampati: II  TM Distance: Normal, at least 6 cm      Dental:  Dental Findings: In tact   Chest/Lungs:  Chest/Lungs Findings: Clear to auscultation     Heart/Vascular:  Heart Findings: Other Heart Findings: V-Paced  PPM in place            Anesthesia Plan  Type of Anesthesia, risks & benefits discussed:  Anesthesia Type:  general  Patient's Preference:   Intra-op Monitoring Plan: arterial line  Intra-op Monitoring Plan Comments:   Post Op Pain Control Plan:   Post Op Pain Control Plan Comments:   Induction:   IV  Beta Blocker:         Informed Consent: Patient understands risks and agrees with Anesthesia plan.  Questions answered. Anesthesia consent signed with patient.  ASA Score: 3     Day of Surgery Review of History & Physical: I have interviewed and examined the patient. I have reviewed the patient's H&P dated:  There are no significant changes.      Anesthesia Plan Notes: Last dose of plavix 4/4/2017        Ready For Surgery From Anesthesia Perspective.     Results for PHUONG JUAN (MRN [id number]) as of 4/7/2017 07:26   Ref. Range 4/7/2017 04:43   WBC Latest Ref Range: 3.90 - 12.70 K/uL 6.39   RBC Latest Ref Range: 4.00 - 5.40 M/uL 3.73 (L)   Hemoglobin Latest Ref Range: 12.0 - 16.0 g/dL 10.5 (L)   Hematocrit Latest Ref Range: 37.0 - 48.5 % 33.6 (L)   Platelets Latest Ref Range: 150 - 350 K/uL 232   Sodium Latest Ref Range: 136 - 145 mmol/L 140   Potassium Latest Ref Range: 3.5 - 5.1 mmol/L 4.3   Chloride Latest Ref Range: 95 - 110 mmol/L 106   CO2 Latest Ref Range: 23 - 29 mmol/L 26   Anion Gap Latest Ref Range: 8 - 16 mmol/L 8   BUN, Bld Latest Ref Range: 8 - 23 mg/dL 13   Creatinine Latest Ref Range: 0.5 - 1.4 mg/dL 1.0   eGFR if non African American Latest Ref Range: >60 mL/min/1.73 m^2 55 (A)   eGFR if African American Latest Ref Range: >60 mL/min/1.73 m^2 >60   Glucose Latest Ref Range: 70 - 110 mg/dL 94   Calcium Latest Ref Range: 8.7 - 10.5 mg/dL 9.1

## 2017-04-07 NOTE — ASSESSMENT & PLAN NOTE
- Cardiology following  - 2D ECHO showed EF 40 % with diastolic dysfunction   - Morphine prn  - Supplemental oxygen prn, keep O2 sats > 92%  - SL Nitro prn  - Continue ASA and Statin

## 2017-04-07 NOTE — PROGRESS NOTES
"[facility] Hospital Medicine  Progress Note    Patient Name: Violet Swenson  MRN: [id number]  Patient Class: IP- Inpatient   Admission Date: 4/5/2017  Length of Stay: 2 days  Attending Physician: Kellee Joshi MD  Primary Care Provider: Marti Coronel MD        Subjective:     Principal Problem:Lower abdominal pain    HPI:  Violet Swenson is a 77 year old female with a PMHx of Hypothyroidism, HTN, HLD, GERD, Depression, GERD, Depression, CAD, Gastric bypass '93, and PPM who presented to the Emergency Department with c/o generalized abdominal pain x 2 days. Pain describes as "cramping." Associated symptoms include: nausea, vomiting, and chest pain.  ED workup revealed:  WBC 13.25, glucose 119, lipase 351, amylase 189, troponin 0.179. CT abdomen/pelvis with soft tissue mass in the RUQ, recommend endoscopy with biopsy. CXR negative. ED discussed case with Cardiology.     Hospital Course:  4/6/17- S/P colonoscopy today with biposy of mass in the terminal ileum. General surgery consulted. Awaiting pathology. 4/7/17- Plan for exploratory laparotomy likely ileocectomy/right hemicolectomy today.      Interval History: No acute issues overnight. Plan for ex lap today per General Surgery    Review of Systems   Constitutional: Negative for appetite change, chills and fever.   HENT: Negative for trouble swallowing and voice change.    Eyes: Negative.    Respiratory: Negative for apnea, cough, choking, chest tightness, shortness of breath, wheezing and stridor.    Cardiovascular: Positive for chest pain. Negative for palpitations and leg swelling.   Gastrointestinal: Positive for abdominal pain, nausea and vomiting. Negative for blood in stool, constipation, diarrhea and rectal pain.   Endocrine: Negative.    Genitourinary: Negative.    Musculoskeletal: Negative.    Skin: Negative.    Allergic/Immunologic: Negative.    Neurological: Negative for dizziness, tremors, seizures, syncope, " facial asymmetry, speech difficulty, weakness, light-headedness, numbness and headaches.   Hematological: Negative.    Psychiatric/Behavioral: Negative.    All other systems reviewed and are negative.    Objective:     Vital Signs (Most Recent):  Temp: 97.9 °F (36.6 °C) (04/07/17 0742)  Pulse: 71 (04/07/17 1035)  Resp: 19 (04/07/17 0742)  BP: (!) 161/85 (04/07/17 0742)  SpO2: 95 % (04/07/17 0940) Vital Signs (24h Range):  Temp:  [97.8 °F (36.6 °C)-98.1 °F (36.7 °C)] 97.9 °F (36.6 °C)  Pulse:  [70-84] 71  Resp:  [16-19] 19  SpO2:  [94 %-95 %] 95 %  BP: (139-161)/(75-85) 161/85     Weight: 82.6 kg (182 lb)  Body mass index is 31.24 kg/(m^2).    Intake/Output Summary (Last 24 hours) at 04/07/17 1140  Last data filed at 04/07/17 0537   Gross per 24 hour   Intake          2331.25 ml   Output              200 ml   Net          2131.25 ml      Physical Exam   Constitutional: She is oriented to person, place, and time. She appears well-developed.   HENT:   Head: Normocephalic and atraumatic.   Eyes: Conjunctivae and EOM are normal.   Neck: Normal range of motion. Neck supple.   Cardiovascular: Normal rate, regular rhythm, normal heart sounds and intact distal pulses.    No murmur heard.  Pulmonary/Chest: Effort normal and breath sounds normal. No respiratory distress. She has no wheezes.   Abdominal: Soft. Bowel sounds are normal. There is no tenderness.   Musculoskeletal: Normal range of motion.   Neurological: She is alert and oriented to person, place, and time.   Skin: Skin is warm and dry.   Psychiatric: She has a normal mood and affect. Her behavior is normal. Judgment and thought content normal.   Nursing note and vitals reviewed.      Significant Labs: All pertinent labs within the past 24 hours have been reviewed.    Significant Imaging:   Imaging Results         CT Abdomen Pelvis With Contrast (Final result) Result time:  04/05/17 11:18:03    Final result by Elieser Enriquez MD (04/05/17 11:18:03)    Impression:              6.9 x 7.0 x 8.4 cm soft tissue mass in the right lower quadrant centered at the terminal ileum abutting the cecum.  Adjacent conglomerate adenopathy in the right lower quadrant mesentery.  Differential considerations include small bowel adenocarcinoma, exophytic colon cancer, gastrointestinal stromal tumor, or lymphoma.  Recommend endoscopy with biopsy.  There is no evidence of bowel obstruction or perforation.    Mild mucosal edema and mucosal hyperenhancement involving the gastric remnant.  Correlate with signs and symptoms of gastritis.    1.6 cm indeterminate left adrenal adenoma.  Metastasis not excluded.        Multifocal renal cortical thinning and cortical scarring.  All CT scans at this facility use dose modulation, iterative reconstruction and/or weight based dosing when appropriate to reduce radiation dose to as low as reasonably achievable.       Electronically signed by: ALONZO CHA MD  Date:     04/05/17  Time:    11:18     Narrative:    Exam: CT ABDOMEN PELVIS WITH CONTRAST    Technique: Axial CT imaging was performed through the abdomen and pelvis with  75cc  of intravenous contrast. Multiplanar reformats were performed and interpreted.    Clinical History:  Abdominal pain.  generalized abdominal pain      Comparison:  None     Findings:          Lung bases are clear.    The liver is normal in size and surface contour.  No focal hepatic lesions.  The gallbladder has been removed.  No biliary ductal dilatation.  The pancreas, spleen, and right adrenal gland are within normal limits.  There is a 1.6 cm left adrenal nodule.  Marked lobulation and cortical thinning of both kidneys.  Multifocal renal cortical scarring.  No hydronephrosis.     The patient is status post gastric bypass.  There is submucosal edema and mucosal hyperenhancement of the excluded gastric remnant.  No evidence of bowel obstruction.    In the right lower quadrant, there is a lobular soft tissue mass that measures 6.9 x 7.0  x 8.4 cm that abuts the mesenteric surface of the cecum centered at the terminal ileum.  Multiple enlarged adjacent ileocolic lymph nodes.  The conglomerate mona mass measures up to 4.1 x 2.9 cm.  Colonic diverticulosis.    Aortic atherosclerosis without evidence of aneurysm.     The urinary bladder is unremarkable. The uterus is unremarkable.  No free pelvic fluid.    No significant osseous abnormality is identified.  No suspicious osseous lesions.            X-Ray Chest AP Portable (Final result) Result time:  04/05/17 08:36:07    Final result by TASHA Kearns Sr., MD (04/05/17 08:36:07)    Impression:        1.  The lungs are clear.  2.  Surgical changes      Electronically signed by: TASHA KEARNS MD  Date:     04/05/17  Time:    08:36     Narrative:    One-view chest x-ray    Clinical History: Chest pain    Finding: A cardiac pacemaker is in place.  The leads appear to be intact.  The size of the heart is normal. The lungs are clear. There is no pneumothorax.  The costophrenic angles are sharp.  Surgical clips are projected over the left upper quadrant of the abdomen.                 Assessment/Plan:      * Lower abdominal pain  - Admit to Inpatient  - CT abdomen/pelvis revealed:  Soft tissue mass in the RLQ. Colonoscopy with biopsy done today.   - Gastroenterology following   - General Surgery consulted, plans for exploratory laparotomy likely ileocectomy/right hemicolectomy  - Analgesics/Antiemetic prn        Essential hypertension  - Stable  - Continue home medications      Hyperlipidemia  - Continue Statin      Hypothyroidism (acquired)  - Obtain TSH  - Resume Levothyroxine      CAD, multiple vessel  - Continue ASA and Statin   - Will hold Plavix for tomorrow morning. Its possible if colonoscopy cannot obtain biopsy, General surgery will be consulted.      Chest pain  - Cardiology following  - 2D ECHO showed EF 40 % with diastolic dysfunction   - Morphine prn  - Supplemental oxygen prn, keep O2 sats >  92%  - SL Nitro prn  - Continue ASA and Statin      VTE Risk Mitigation         Ordered     Medium Risk of VTE  Once      04/05/17 1441     heparin (porcine) injection 5,000 Units  Every 8 hours     Route:  Subcutaneous        04/05/17 1231          Kareem Roberts NP  Department of Hospital Medicine   [facility]

## 2017-04-07 NOTE — PROGRESS NOTES
[facility] -   General Surgery  Progress Note    Subjective:     History of Present Illness:  78 y/o female referred for partially obstructing mass of the terminal ileum/cecum. She presented with a few day history of worsening right lower quadrant abdominal pain, nausea/emesis. She underwent CT scan concerning for ileocecal mass and subsequent colonoscopy showed partially obstructing mass of the terminal ileum.  She has 30lb weight loss in recent months, denies any fever/chills, diarrhea, constipation. She currently reports crampy abdominal pain in the RLQ. Previously had gastric bypass, cholecystectomy, appendectomy.    Post-Op Info:  Procedure(s) (LRB):  EXPLORATORY-LAPAROTOMY (N/A)  COLECTOMY-PARTIAL (N/A)         Interval History: no acute events overnight    Medications:  Continuous Infusions:   sodium chloride 0.9% 75 mL/hr at 04/07/17 0038     Scheduled Meds:   allopurinol  200 mg Oral Daily    heparin (porcine)  5,000 Units Subcutaneous Q8H    isosorbide mononitrate  30 mg Oral Daily    levothyroxine  75 mcg Oral Before breakfast    morphine  4 mg Intravenous Once    pantoprazole  40 mg Intravenous Daily    rosuvastatin  10 mg Oral Daily    sertraline  100 mg Oral Daily     PRN Meds:diphenhydrAMINE, hydrocodone-acetaminophen 10-325mg, morphine, nitroGLYCERIN, ondansetron, promethazine (PHENERGAN) IVPB     Review of patient's allergies indicates:   Allergen Reactions    Corticosteroids (glucocorticoids) Nausea Only and Other (See Comments)     Stomach pain, dizziness, headache    Oxycodone Other (See Comments)     Blood pressure dropped     Objective:     Vital Signs (Most Recent):  Temp: 97.9 °F (36.6 °C) (04/07/17 0742)  Pulse: 74 (04/07/17 0742)  Resp: 19 (04/07/17 0742)  BP: (!) 161/85 (04/07/17 0742)  SpO2: 95 % (04/07/17 0940) Vital Signs (24h Range):  Temp:  [97.8 °F (36.6 °C)-98.1 °F (36.7 °C)] 97.9 °F (36.6 °C)  Pulse:  [70-86] 74  Resp:  [16-19] 19  SpO2:  [92 %-96 %] 95  %  BP: (125-172)/(75-91) 161/85     Weight: 82.6 kg (182 lb)  Body mass index is 31.24 kg/(m^2).    Intake/Output - Last 3 Shifts       04/05 0700 - 04/06 0659 04/06 0700 - 04/07 0659 04/07 0700 - 04/08 0659    P.O. 2000 560     I.V. (mL/kg) 1442.9 (17.5) 2171.3 (26.3)     Total Intake(mL/kg) 3442.9 (41.7) 2731.3 (33.1)     Urine (mL/kg/hr)  200 (0.1)     Total Output   200      Net +3442.9 +2531.3             Urine Occurrence 5 x 3 x     Stool Occurrence 1 x            Physical Exam   Constitutional: She appears well-developed and well-nourished. No distress.   Cardiovascular: Normal rate and regular rhythm.    Pulmonary/Chest: Effort normal.   Abdominal: Soft. She exhibits no distension. There is tenderness (RLQ).   Skin: She is not diaphoretic.   Vitals reviewed.      Significant Labs:  CBC:   Recent Labs  Lab 04/07/17  0443   WBC 6.39   RBC 3.73*   HGB 10.5*   HCT 33.6*      MCV 90   MCH 28.2   MCHC 31.3*     CMP:   Recent Labs  Lab 04/05/17  0807  04/07/17  0443   *  < > 94   CALCIUM 10.0  < > 9.1   ALBUMIN 4.2  --   --    PROT 8.6*  --   --      < > 140   K 4.6  < > 4.3   CO2 24  < > 26   CL 98  < > 106   BUN 17  < > 13   CREATININE 1.1  < > 1.0   ALKPHOS 81  --   --    ALT 22  --   --    AST 37  --   --    BILITOT 0.4  --   --    < > = values in this interval not displayed.      Assessment/Plan:     * Lower abdominal pain  Partially obstructing mass of terminal ileum  OR today for exploratory laparotomy likely ileocectomy/right hemicolectomy  NPO   Discussed findings, procedure and risks with patient. She is agreeable to procedure.    CAD, multiple vessel  Cards following - cleared for surgery      Orlando Moulton MD  General Surgery  [facility]

## 2017-04-07 NOTE — PLAN OF CARE
Problem: Patient Care Overview  Goal: Plan of Care Review  Outcome: Ongoing (interventions implemented as appropriate)  Pt remained afebrile throughout this shift. IV fluids administered per order. Pt remained free of falls this shift.NPO post surgery. Plan of care reviewed. Patient/daughter verbalizes understanding. Pt on a morphine PCA and family instructed not to press button. Pt coached to turn Q2. Dueñas cath in place.Patient SR on monitor. Bed low, side rails up x 2, wheels locked, call light in reach. Patient instructed to call for assistance. Will continue to monitor.

## 2017-04-07 NOTE — SUBJECTIVE & OBJECTIVE
Interval History: no acute events overnight    Medications:  Continuous Infusions:   sodium chloride 0.9% 75 mL/hr at 04/07/17 0038     Scheduled Meds:   allopurinol  200 mg Oral Daily    heparin (porcine)  5,000 Units Subcutaneous Q8H    isosorbide mononitrate  30 mg Oral Daily    levothyroxine  75 mcg Oral Before breakfast    morphine  4 mg Intravenous Once    pantoprazole  40 mg Intravenous Daily    rosuvastatin  10 mg Oral Daily    sertraline  100 mg Oral Daily     PRN Meds:diphenhydrAMINE, hydrocodone-acetaminophen 10-325mg, morphine, nitroGLYCERIN, ondansetron, promethazine (PHENERGAN) IVPB     Review of patient's allergies indicates:   Allergen Reactions    Corticosteroids (glucocorticoids) Nausea Only and Other (See Comments)     Stomach pain, dizziness, headache    Oxycodone Other (See Comments)     Blood pressure dropped     Objective:     Vital Signs (Most Recent):  Temp: 97.9 °F (36.6 °C) (04/07/17 0742)  Pulse: 74 (04/07/17 0742)  Resp: 19 (04/07/17 0742)  BP: (!) 161/85 (04/07/17 0742)  SpO2: 95 % (04/07/17 0940) Vital Signs (24h Range):  Temp:  [97.8 °F (36.6 °C)-98.1 °F (36.7 °C)] 97.9 °F (36.6 °C)  Pulse:  [70-86] 74  Resp:  [16-19] 19  SpO2:  [92 %-96 %] 95 %  BP: (125-172)/(75-91) 161/85     Weight: 82.6 kg (182 lb)  Body mass index is 31.24 kg/(m^2).    Intake/Output - Last 3 Shifts       04/05 0700 - 04/06 0659 04/06 0700 - 04/07 0659 04/07 0700 - 04/08 0659    P.O. 2000 560     I.V. (mL/kg) 1442.9 (17.5) 2171.3 (26.3)     Total Intake(mL/kg) 3442.9 (41.7) 2731.3 (33.1)     Urine (mL/kg/hr)  200 (0.1)     Total Output   200      Net +3442.9 +2531.3             Urine Occurrence 5 x 3 x     Stool Occurrence 1 x            Physical Exam   Constitutional: She appears well-developed and well-nourished. No distress.   Cardiovascular: Normal rate and regular rhythm.    Pulmonary/Chest: Effort normal.   Abdominal: Soft. She exhibits no distension. There is tenderness (RLQ).   Skin: She is not  diaphoretic.   Vitals reviewed.      Significant Labs:  CBC:   Recent Labs  Lab 04/07/17 0443   WBC 6.39   RBC 3.73*   HGB 10.5*   HCT 33.6*      MCV 90   MCH 28.2   MCHC 31.3*     CMP:   Recent Labs  Lab 04/05/17  0807 04/07/17 0443   *  < > 94   CALCIUM 10.0  < > 9.1   ALBUMIN 4.2  --   --    PROT 8.6*  --   --      < > 140   K 4.6  < > 4.3   CO2 24  < > 26   CL 98  < > 106   BUN 17  < > 13   CREATININE 1.1  < > 1.0   ALKPHOS 81  --   --    ALT 22  --   --    AST 37  --   --    BILITOT 0.4  --   --    < > = values in this interval not displayed.

## 2017-04-07 NOTE — OP NOTE
[facility]  Surgery Department  Operative Note    SUMMARY     Date of Procedure: 4/7/2017     Procedure: Procedure(s) (LRB):  EXPLORATORY-LAPAROTOMY (N/A)  COLECTOMY-PARTIAL (N/A)  LYSIS-ADHESION (N/A)     Surgeon(s) and Role:     * Orlando Moulton MD - Primary     * Saúl Shah MD - Assisting        Pre-Operative Diagnosis: Small bowel mass [K63.89]    Post-Operative Diagnosis: Post-Op Diagnosis Codes:     * Small bowel mass [K63.89]    Anesthesia: General    Technical Procedures Used: en bloc resection right gisele colectomy with terminal ileum and jejunal resection    Description of the Findings of the Procedure: large ileocecal mass with jejunal loop densely adherent to mass    Complications: No    Estimated Blood Loss (EBL): 35 mL           Implants:   Implant Name Type Inv. Item Serial No.  Lot No. LRB No. Used   BARRIER INTERCEED 3X4 ST DIS - [id number]   BARRIER INTERCEED 3X4 ST DIS   [employer] [id number] N/A 2       Specimens:   Specimen (12h ago through future)    Start     Ordered    04/07/17 1327  Specimen to Pathology - Surgery  Once     Comments:  1. Right colon with section of jejunum with margins (perm)    Dx:  Colon mass    04/07/17 1327    04/07/17 1307  Specimen to Pathology - Surgery  Once     Comments:  Right colon with section of jejunum    Dx:  Colon mass    04/07/17 1307                  Condition: Good    Disposition: PACU - hemodynamically stable.    Procedure in Detail:  The patient was taken to the OR and underwent general anesthesia. She was prepped and draped in the usual sterile fashion. A midline incision was made and bovie electrocautery used to dissect down through the abdominal wall. The abdomen was entered and there were noted adhesions to surrounding tissues and bowel. The adhesions were taken down sharply and with electrocautery until bowel was adequately free, this took greater than 1 hour. We then placed a wound protector. There was no obvious  metastatic disease, the mass was large and located at the ileocecal junction. There was a loop of jejunum that was densely adherent to the mass we divided the loop of jejunum with MAREN stapler with blue load to include the involved loop with the en bloc resection. We then mobilized the colon along the white line of toldt. The colon was divided just proximal to middle colic vessels and distally the terminal ileum was divided 10cm proximal to the mass and terminal ileum. Enseal was used to transect the mesentery including lymph nodes with the specimen tying off mesenteric vessels with 0-silk. The specimen was passed off. The jejunal ends were reanastomosed with a side to side staple anastomosis oversewing the staple line with 3-0 silk. The mesenteric defect was closed with 3-0 vicryl. We then performed a ileocolonic anastomosis with a side to side staple anastomosis oversewing the staple line with 3-0 silk. The mesenteric defect was closed with 3-0 vicryl. The patient jones limb (prior gastric bypass was kept in a retrocollic position. We then irrigated the abdomen, hemostasis was achieved. Exparel was injected into the fascia and interceed placed in the midline. The incision was closed with 1-0 looped PDS in a running fashion, skin staples placed, and sterile dressing. The patient was transported to recovery in a stable and satisfactory condition.

## 2017-04-07 NOTE — PROGRESS NOTES
Pt family has requested pt have all 4 side rails up to prevent her from falling out. Spoke with family about it being classified as a restraint. Pts daughter said she did not care sh wants all 4 side rails up.

## 2017-04-07 NOTE — PLAN OF CARE
Ambulated in room and to bathroom. Able to move self into bed. Pt escorted to preop via stretcher and portable monitor and accompanied by  and rn. Report received from liane ferris. Pt awaiting surgery.

## 2017-04-08 PROBLEM — I25.5 ISCHEMIC CARDIOMYOPATHY: Status: ACTIVE | Noted: 2017-04-08

## 2017-04-08 PROBLEM — C18.0: Status: ACTIVE | Noted: 2017-04-08

## 2017-04-08 PROCEDURE — 63600175 PHARM REV CODE 636 W HCPCS: Performed by: EMERGENCY MEDICINE

## 2017-04-08 PROCEDURE — 99232 SBSQ HOSP IP/OBS MODERATE 35: CPT | Mod: ,,, | Performed by: INTERNAL MEDICINE

## 2017-04-08 PROCEDURE — 94770 HC EXHALED C02 TEST: CPT

## 2017-04-08 PROCEDURE — 25000003 PHARM REV CODE 250: Performed by: SURGERY

## 2017-04-08 PROCEDURE — 94761 N-INVAS EAR/PLS OXIMETRY MLT: CPT

## 2017-04-08 PROCEDURE — C9113 INJ PANTOPRAZOLE SODIUM, VIA: HCPCS | Performed by: EMERGENCY MEDICINE

## 2017-04-08 PROCEDURE — G8981 BODY POS CURRENT STATUS: HCPCS | Mod: CK

## 2017-04-08 PROCEDURE — 63600175 PHARM REV CODE 636 W HCPCS: Performed by: SURGERY

## 2017-04-08 PROCEDURE — 96372 THER/PROPH/DIAG INJ SC/IM: CPT

## 2017-04-08 PROCEDURE — G8982 BODY POS GOAL STATUS: HCPCS | Mod: CH

## 2017-04-08 PROCEDURE — 97530 THERAPEUTIC ACTIVITIES: CPT

## 2017-04-08 PROCEDURE — 94799 UNLISTED PULMONARY SVC/PX: CPT

## 2017-04-08 PROCEDURE — 63600175 PHARM REV CODE 636 W HCPCS: Performed by: NURSE PRACTITIONER

## 2017-04-08 PROCEDURE — 97161 PT EVAL LOW COMPLEX 20 MIN: CPT

## 2017-04-08 PROCEDURE — 21400001 HC TELEMETRY ROOM

## 2017-04-08 RX ORDER — MORPHINE SULFATE 2 MG/ML
2 INJECTION, SOLUTION INTRAMUSCULAR; INTRAVENOUS ONCE
Status: COMPLETED | OUTPATIENT
Start: 2017-04-08 | End: 2017-04-08

## 2017-04-08 RX ORDER — MORPHINE SULFATE 1 MG/ML
INJECTION INTRAVENOUS CONTINUOUS
Status: DISCONTINUED | OUTPATIENT
Start: 2017-04-08 | End: 2017-04-08

## 2017-04-08 RX ORDER — SERTRALINE HYDROCHLORIDE 50 MG/1
100 TABLET, FILM COATED ORAL DAILY
Status: DISCONTINUED | OUTPATIENT
Start: 2017-04-08 | End: 2017-04-14 | Stop reason: HOSPADM

## 2017-04-08 RX ORDER — HYDROMORPHONE HYDROCHLORIDE 2 MG/ML
2 INJECTION, SOLUTION INTRAMUSCULAR; INTRAVENOUS; SUBCUTANEOUS ONCE
Status: DISCONTINUED | OUTPATIENT
Start: 2017-04-08 | End: 2017-04-08

## 2017-04-08 RX ORDER — GABAPENTIN 100 MG/1
100 CAPSULE ORAL 3 TIMES DAILY
Status: DISCONTINUED | OUTPATIENT
Start: 2017-04-08 | End: 2017-04-14 | Stop reason: HOSPADM

## 2017-04-08 RX ORDER — LEVOTHYROXINE SODIUM 75 UG/1
75 TABLET ORAL
Status: DISCONTINUED | OUTPATIENT
Start: 2017-04-09 | End: 2017-04-14 | Stop reason: HOSPADM

## 2017-04-08 RX ORDER — VERAPAMIL HYDROCHLORIDE 120 MG/1
120 TABLET, FILM COATED, EXTENDED RELEASE ORAL NIGHTLY
Status: DISCONTINUED | OUTPATIENT
Start: 2017-04-08 | End: 2017-04-09

## 2017-04-08 RX ORDER — DEXTROSE MONOHYDRATE, SODIUM CHLORIDE, AND POTASSIUM CHLORIDE 50; 1.49; 9 G/1000ML; G/1000ML; G/1000ML
INJECTION, SOLUTION INTRAVENOUS CONTINUOUS
Status: DISCONTINUED | OUTPATIENT
Start: 2017-04-08 | End: 2017-04-11

## 2017-04-08 RX ORDER — NALOXONE HCL 0.4 MG/ML
0.02 VIAL (ML) INJECTION
Status: DISCONTINUED | OUTPATIENT
Start: 2017-04-08 | End: 2017-04-08

## 2017-04-08 RX ORDER — ACETAMINOPHEN 10 MG/ML
1000 INJECTION, SOLUTION INTRAVENOUS EVERY 8 HOURS
Status: DISCONTINUED | OUTPATIENT
Start: 2017-04-08 | End: 2017-04-08

## 2017-04-08 RX ORDER — MORPHINE SULFATE 2 MG/ML
2 INJECTION, SOLUTION INTRAMUSCULAR; INTRAVENOUS
Status: DISCONTINUED | OUTPATIENT
Start: 2017-04-08 | End: 2017-04-14 | Stop reason: HOSPADM

## 2017-04-08 RX ORDER — ACETAMINOPHEN 10 MG/ML
1000 INJECTION, SOLUTION INTRAVENOUS EVERY 8 HOURS
Status: DISCONTINUED | OUTPATIENT
Start: 2017-04-08 | End: 2017-04-09

## 2017-04-08 RX ADMIN — GABAPENTIN 100 MG: 100 CAPSULE ORAL at 01:04

## 2017-04-08 RX ADMIN — Medication: at 03:04

## 2017-04-08 RX ADMIN — POTASSIUM CHLORIDE, DEXTROSE MONOHYDRATE AND SODIUM CHLORIDE: 150; 5; 900 INJECTION, SOLUTION INTRAVENOUS at 03:04

## 2017-04-08 RX ADMIN — HEPARIN SODIUM 5000 UNITS: 5000 INJECTION, SOLUTION INTRAVENOUS; SUBCUTANEOUS at 01:04

## 2017-04-08 RX ADMIN — METRONIDAZOLE 500 MG: 500 INJECTION, SOLUTION INTRAVENOUS at 02:04

## 2017-04-08 RX ADMIN — ACETAMINOPHEN 1000 MG: 10 INJECTION, SOLUTION INTRAVENOUS at 09:04

## 2017-04-08 RX ADMIN — DIPHENHYDRAMINE HYDROCHLORIDE 12.5 MG: 50 INJECTION, SOLUTION INTRAMUSCULAR; INTRAVENOUS at 11:04

## 2017-04-08 RX ADMIN — Medication: at 09:04

## 2017-04-08 RX ADMIN — HEPARIN SODIUM 5000 UNITS: 5000 INJECTION, SOLUTION INTRAVENOUS; SUBCUTANEOUS at 05:04

## 2017-04-08 RX ADMIN — Medication: at 05:04

## 2017-04-08 RX ADMIN — ONDANSETRON 4 MG: 2 INJECTION INTRAMUSCULAR; INTRAVENOUS at 01:04

## 2017-04-08 RX ADMIN — VERAPAMIL HYDROCHLORIDE 120 MG: 120 TABLET, FILM COATED, EXTENDED RELEASE ORAL at 09:04

## 2017-04-08 RX ADMIN — MORPHINE SULFATE 2 MG: 2 INJECTION, SOLUTION INTRAMUSCULAR; INTRAVENOUS at 09:04

## 2017-04-08 RX ADMIN — ACETAMINOPHEN 1000 MG: 10 INJECTION, SOLUTION INTRAVENOUS at 07:04

## 2017-04-08 RX ADMIN — PANTOPRAZOLE SODIUM 40 MG: 40 INJECTION, POWDER, FOR SOLUTION INTRAVENOUS at 09:04

## 2017-04-08 RX ADMIN — HEPARIN SODIUM 5000 UNITS: 5000 INJECTION, SOLUTION INTRAVENOUS; SUBCUTANEOUS at 09:04

## 2017-04-08 RX ADMIN — ONDANSETRON 4 MG: 2 INJECTION INTRAMUSCULAR; INTRAVENOUS at 03:04

## 2017-04-08 RX ADMIN — DIPHENHYDRAMINE HYDROCHLORIDE 12.5 MG: 50 INJECTION, SOLUTION INTRAMUSCULAR; INTRAVENOUS at 09:04

## 2017-04-08 RX ADMIN — SERTRALINE HYDROCHLORIDE 100 MG: 50 TABLET ORAL at 01:04

## 2017-04-08 RX ADMIN — MORPHINE SULFATE 2 MG: 2 INJECTION, SOLUTION INTRAMUSCULAR; INTRAVENOUS at 06:04

## 2017-04-08 RX ADMIN — GABAPENTIN 100 MG: 100 CAPSULE ORAL at 09:04

## 2017-04-08 NOTE — PT/OT/SLP EVAL
Physical Therapy  Evaluation    Violet Swenson   MRN: [id number]   Admitting Diagnosis: Lower abdominal pain    PT Received On: 17  PT Start Time: 1210     PT Stop Time: 1245    PT Total Time (min): 35 min       Billable Minutes:  Evaluation 15  Therapeutic Activity 20    Diagnosis: Lower abdominal pain      Past Medical History:   Diagnosis Date    Arthritis     Coronary artery disease     Depression     GERD (gastroesophageal reflux disease)     Gout, arthritis     Hyperlipidemia     Hypertension     Hypothyroidism     Lung nodule     RML--stable    Pacemaker     Pneumonia       Past Surgical History:   Procedure Laterality Date    APPENDECTOMY      CARDIAC PACEMAKER PLACEMENT  2015    CHOLECYSTECTOMY      COLONOSCOPY N/A 2017    Procedure: COLONOSCOPY;  Surgeon: Tye Enamorado MD;  Location: [facility];  Service: Endoscopy;  Laterality: N/A;    CORONARY ANGIOPLASTY  2014    CORONARY STENT PLACEMENT  2014    GASTRIC BYPASS      HERNIA REPAIR      TONSILLECTOMY      TOTAL KNEE ARTHROPLASTY Bilateral            General Precautions: Standard,    Orthopedic Precautions: N/A   Braces: N/A            Patient History:  Lives With: spouse, grandchild(aure)  Living Arrangements: house  Home Accessibility:  (no concerns)  Home Layout: Able to live on 1st floor  Transportation Available: family or friend will provide  Equipment Currently Used at Home: cane, straight, walker, rolling  DME owned (not currently used): rolling walker and single point cane    Previous Level of Function:  Ambulation Skills: independent  Transfer Skills: independent  ADL Skills: independent  Work/Leisure Activity: independent    Subjective:  Communicated with Nursing prior to session.  Pt agrees to sit in chair    Chief Complaint: abdominal pain  Patient goals: decrease pain    Pain Ratin/10         Location: abdomen  Pain Addressed: Reposition  Pain Rating Post-Intervention:  9/10    Objective:   Patient found with: PCA, oxygen, peripheral IV     Cognitive Exam:  Oriented to: Person, Place, Time and Situation    Follows Commands/attention: Follows multistep  commands  Communication: clear/fluent  Safety awareness/insight to disability: intact    Physical Exam:  Postural examination/scapula alignment: Rounded shoulder and Head forward    Skin integrity: Visible skin intact  Edema: None noted     Sensation:   Intact    Upper Extremity Range of Motion:      Lower Extremity Range of Motion:  Right Lower Extremity: WFL  Left Lower Extremity: WFL    Lower Extremity Strength:  Right Lower Extremity: WFL  Left Lower Extremity: WFL     Fine motor coordination:  Intact    Gross motor coordination: WFL    Functional Mobility:  Bed Mobility:  Scooting/Bridging: Moderate Assistance  Supine to Sit: Moderate Assistance  Sit to Supine: Moderate Assistance    Transfers:  Sit <> Stand Assistance: Minimum Assistance  Sit <> Stand Assistive Device: Rolling Walker  Bed <> Chair Technique: Stand Pivot    Gait:   Gait Distance: Not tested      Balance:   Static Sit: FAIR: Maintains without assist, but unable to take any challenges   Dynamic Sit: FAIR: Cannot move trunk without losing balance  Static Stand: POOR+: Needs MINIMAL assist to maintain  Dynamic stand: POOR: N/A    Therapeutic Activities and Exercises:  Transferred to chair with Min A.  Pt complained on nausea and nursing notified.  After about 5 minutes nausea did not subside and patient was transferred back to bed with Min A for standing and sitting.    AM-PAC 6 CLICK MOBILITY  How much help from another person does this patient currently need?   1 = Unable, Total/Dependent Assistance  2 = A lot, Maximum/Moderate Assistance  3 = A little, Minimum/Contact Guard/Supervision  4 = None, Modified Jackson/Independent    Turning over in bed (including adjusting bedclothes, sheets and blankets)?: 3  Sitting down on and standing up from a chair with  arms (e.g., wheelchair, bedside commode, etc.): 3  Moving from lying on back to sitting on the side of the bed?: 3  Moving to and from a bed to a chair (including a wheelchair)?: 3  Need to walk in hospital room?: 3     AM-PAC Raw Score CMS G-Code Modifier Level of Impairment Assistance   6 % Total / Unable   7 - 9 CM 80 - 100% Maximal Assist   10 - 14 CL 60 - 80% Moderate Assist   15 - 19 CK 40 - 60% Moderate Assist   20 - 22 CJ 20 - 40% Minimal Assist   23 CI 1-20% SBA / CGA   24 CH 0% Independent/ Mod I     Patient left HOB elevated with all lines intact, call button in reach and  and MD present.    Assessment:   Violet Swenson is a 77 y.o. female with a medical diagnosis of Lower abdominal pain and presents with impaired functional mobility, gait instability and pain that limits function.  Pt will benefit from PT to address impairments listed.  Todays tx limited by pain and nausea.    Rehab identified problem list/impairments: Rehab identified problem list/impairments: weakness, impaired self care skills, impaired functional mobilty, gait instability, pain, decreased lower extremity function    Rehab potential is good.    Activity tolerance: Fair    Discharge recommendations: Discharge Facility/Level Of Care Needs: home with home health     Barriers to discharge:   None  Equipment recommendations: Equipment Needed After Discharge: bedside commode, bath bench     GOALS:   Physical Therapy Goals        Problem: Physical Therapy Goal    Goal Priority Disciplines Outcome Goal Variances Interventions   Physical Therapy Goal     PT/OT, PT      Description:  PT eval complete.  Pt will be able to perform the following in 7 days  1.  Pt will be IND with bed mobility  2.  Pt will transfer bed to chair IND  3.  Pt will ambulate 150 feet without AD with SPV/IND              PLAN:    Patient to be seen  (Will be seen a min of 5 out of 7) to address the above listed problems via gait training,  therapeutic activities, therapeutic exercises  Plan of Care expires:    Plan of Care reviewed with: patient, spouse    Functional Assessment Tool Used: FIMS  Score: 4  Functional Limitation: Changing and maintaining body position  Changing and Maintaining Body Position Current Status (): CK  Changing and Maintaining Body Position Goal Status (): LICO Bedolla, PT  04/08/2017

## 2017-04-08 NOTE — PROGRESS NOTES
Cardiology Progress Note        SUBJECTIVE:     History of Present Illness:  Patient is a 77 y.o. female presents with abd cramp.   Had surgery yesterday to remove small intestine mass,.  Has abd pain. No dyspnea and chest pain.  EKG v-paced.  ECho showed EF 40% with RWMA. LVDD, LAE.    OBJECTIVE:     Vital Signs (Most Recent)  Temp: 97.8 °F (36.6 °C) (04/08/17 1153)  Pulse: 90 (04/08/17 1153)  Resp: 16 (04/08/17 1153)  BP: (!) 151/68 (04/08/17 1153)  SpO2: (!) 91 % (04/08/17 1153)    Vital Signs Range (Last 24H):  Temp:  [97.6 °F (36.4 °C)-98.2 °F (36.8 °C)]   Pulse:  [72-95]   Resp:  [10-18]   BP: (138-191)/(59-96)   SpO2:  [91 %-100 %]     Intake/Output last 3 shifts:  I/O last 3 completed shifts:  In: 3849.5 [I.V.:3549.5; IV Piggyback:300]  Out: 1760 [Urine:1625; Blood:135]    Intake/Output this shift:       Review of patient's allergies indicates:   Allergen Reactions    Corticosteroids (glucocorticoids) Nausea Only and Other (See Comments)     Stomach pain, dizziness, headache    Oxycodone Other (See Comments)     Blood pressure dropped       Current Facility-Administered Medications   Medication    dextrose 5 % and 0.9 % NaCl with KCl 20 mEq infusion    diphenhydrAMINE injection 12.5 mg    gabapentin capsule 100 mg    heparin (porcine) injection 5,000 Units    hydrALAZINE injection 10 mg    [START ON 4/9/2017] levothyroxine tablet 75 mcg    morphine PCA 30 mg in NS 30 mL premix syringe (1mg/mL)    naloxone 0.4 mg/mL injection 0.02 mg    ondansetron injection 4 mg    pantoprazole injection 40 mg    promethazine (PHENERGAN) 6.25 mg in dextrose 5 % 50 mL IVPB    sertraline tablet 100 mg    verapamil CR tablet 120 mg     No current facility-administered medications on file prior to encounter.      Current Outpatient Prescriptions on File Prior to Encounter   Medication Sig    allopurinol (ZYLOPRIM) 100 MG tablet TAKE 2 TABLET BY MOUTH DAILY    aspirin (ECOTRIN) 81 MG EC tablet Take 81 mg by mouth  once daily.     baicalin-catechin 500 mg Cap Take 500 mg by mouth 2 (two) times daily.    clopidogrel (PLAVIX) 75 mg tablet Take 1 tablet (75 mg total) by mouth once daily.    COLCRYS 0.6 mg tablet TAKE 1 TABLET(0.6 MG) BY MOUTH EVERY DAY    ergocalciferol, vitamin D2, 400 unit Tab Take by mouth daily.    fluticasone (FLONASE) 50 mcg/actuation nasal spray 2 sprays by Each Nare route once daily.    gabapentin (NEURONTIN) 100 MG capsule Take 100 mg by mouth 3 (three) times daily.    hydrochlorothiazide (HYDRODIURIL) 12.5 MG Tab TAKE 1 TABLET BY MOUTH ONCE DAILY    isosorbide mononitrate (IMDUR) 30 MG 24 hr tablet TAKE 1 TABLET BY MOUTH EVERY DAY    levothyroxine (SYNTHROID) 75 MCG tablet TAKE 1 TABLET(75 MCG) BY MOUTH BEFORE BREAKFAST    meloxicam (MOBIC) 7.5 MG tablet TAKE 1 TABLET BY MOUTH ONCE DAILY AS NEEDED FOR PAIN    multivitamin capsule Take 1 capsule by mouth once daily.    rosuvastatin (CRESTOR) 10 MG tablet Take 1 tablet (10 mg total) by mouth once daily.    sertraline (ZOLOFT) 100 MG tablet Take 1 tablet (100 mg total) by mouth once daily.    turmeric root extract 500 mg Cap Take 500 mg by mouth 2 (two) times daily.    ZINC ACETATE ORAL 250 mg.    nitroglycerin 400 mcg/spray SprA Place onto the tongue.    ranitidine (ZANTAC) 150 MG tablet Take 150 mg by mouth nightly.       Physical Exam:  General appearance: alert, appears stated age and cooperative  Head: Normocephalic, without obvious abnormality, atraumatic  Eyes:  conjunctivae/corneas clear. PERRL, EOM's intact. Fundi benign.  Nose: no discharge  Throat: normal findings: tongue midline and normal  Neck: no adenopathy, no carotid bruit, no JVD, supple, symmetrical, trachea midline and thyroid not enlarged, symmetric, no tenderness/mass/nodules  Back:  no skin lesions, erythema, or scars  Lungs:  clear to auscultation bilaterally  Chest wall: no tenderness  Heart: regular rate and rhythm, S1, S2 normal, SM on left chest   Abdomen: soft,  dressing on place.  Extremities: extremities normal, atraumatic, no cyanosis or edema  Pulses: 2+ and symmetric  Skin: Skin color, texture, turgor normal. No rashes or lesions  Neurologic: Grossly normal    Laboratory:  Chemistry:   Lab Results   Component Value Date     04/07/2017    K 3.9 04/07/2017     04/07/2017    CO2 21 (L) 04/07/2017    BUN 13 04/07/2017    CREATININE 1.0 04/07/2017    CALCIUM 8.5 (L) 04/07/2017     Cardiac Markers:   Lab Results   Component Value Date    TROPONINI 0.122 (H) 04/06/2017     Cardiac Markers (Last 3):   Lab Results   Component Value Date    TROPONINI 0.122 (H) 04/06/2017    TROPONINI 0.172 (H) 04/05/2017    TROPONINI 0.179 (H) 04/05/2017     CBC:   Lab Results   Component Value Date    WBC 19.07 (H) 04/07/2017    HGB 10.7 (L) 04/07/2017    HCT 33.6 (L) 04/07/2017    HCT 35 (L) 04/07/2017    MCV 89 04/07/2017     04/07/2017     Lipids:   Lab Results   Component Value Date    CHOL 138 04/05/2017    TRIG 86 04/05/2017    HDL 58 04/05/2017     Coagulation:   Lab Results   Component Value Date    INR 1.0 04/05/2017    APTT 26.3 04/05/2017       Diagnostic Results:  ECG: Reviewed  X-Ray: Reviewed  US: Reviewed  CT: Reviewed  Echo: Reviewed      ASSESSMENT/PLAN:     Patient Active Problem List   Diagnosis    Essential hypertension    Hyperlipidemia    Hypothyroidism (acquired)    Major depression, chronic    CAD, multiple vessel    Arthritis    S/P coronary artery stent placement    Pacemaker    Renal insufficiency    NSAID long-term use    Idiopathic chronic gout of multiple sites without tophus    Acute idiopathic gout of left foot    Primary osteoarthritis involving multiple joints    Neuropathic pain of right foot    Coronary artery disease involving native coronary artery of native heart without angina pectoris    Stenosing tenosynovitis of finger of left hand    Myofascial pain    Chest pain    Abnormal CT scan, gastrointestinal tract     Lower abdominal pain    Abdominal pain    Malignant neoplasm of ileocecal valve    Ischemic cardiomyopathy   Elevated troponin. Demanding ischemia from abd cramp.  Small intestine  mass  CAD s/p PCI and PPM  Cardiomyopathy EF 40%, euvolemic.  H/O gastric bypass  Strong f/h GI malignancy     Plan:   Continue pain control  Verapamil oral for HTN and titrate up.  Resume ASA 81 mg if ok with surgery

## 2017-04-08 NOTE — PLAN OF CARE
Problem: Patient Care Overview  Goal: Plan of Care Review  Outcome: Ongoing (interventions implemented as appropriate)  Pt reported 10/10 pain with PCA morphine but frequently sleeping between care and drowsy during shift, spoke with Dr. Houston at 4/7/17 2300 about Pt sedated state and low RR/EtCO2 alarms, Dr. Houston changed the PCA bolus dose & max dose/hr. EtCO2 monitor/RR values varied greatly and were triggering alarms d/t shallow breathing & EtCO2 range (15-51) but by assessment respiratory rate was within normal limits. Sent message to Dr. Shah at 4/8/17 00:17 via [facility] about PCA EtCo2/RR alarms, he acknowledged at 00:19 and made no changes to orders.      Pt remained free of injury during shift, stable condition,  remained NPO, dressing CDI, receiving IV fluids, receiving antibiotics, no acute distress, and will continue to monitor. 24hr chart review performed.

## 2017-04-08 NOTE — ASSESSMENT & PLAN NOTE
ECHO EF 40%  -monitor fluid balance  -discussed with Dr. Shah  -Metoprolol indicated: CCB not indicated in cardiomyopathy

## 2017-04-08 NOTE — ANESTHESIA POSTPROCEDURE EVALUATION
"Anesthesia Post Evaluation    Patient: Violet Swenson    Procedure(s) Performed: Procedure(s) (LRB):  EXPLORATORY-LAPAROTOMY (N/A)  COLECTOMY-PARTIAL (N/A)  LYSIS-ADHESION (N/A)    Final Anesthesia Type: general  Patient location during evaluation: PACU  Patient participation: Yes- Able to Participate  Level of consciousness: awake and alert  Post-procedure vital signs: reviewed and stable  Pain management: adequate  Airway patency: patent  PONV status at discharge: No PONV  Anesthetic complications: no      Cardiovascular status: blood pressure returned to baseline  Respiratory status: unassisted  Hydration status: euvolemic  Follow-up not needed.        Visit Vitals    BP (!) 143/59 (BP Location: Right arm, Patient Position: Lying, BP Method: Automatic)    Pulse 72    Temp 36.6 °C (97.8 °F) (Oral)    Resp 12    Ht 5' 4" (1.626 m)    Wt 82.6 kg (182 lb)    SpO2 100%    Breastfeeding No    BMI 31.24 kg/m2       Pain/Prosper Score: Pain Assessment Performed: Yes (4/7/2017  5:15 PM)  Presence of Pain: non-verbal indicators present (4/7/2017  5:15 PM)  Pain Rating Prior to Med Admin: 10 (4/7/2017  7:23 PM)  Pain Rating Post Med Admin: 8 (4/7/2017  7:36 AM)  Prosper Score: 8 (4/7/2017  3:30 PM)      "

## 2017-04-08 NOTE — SUBJECTIVE & OBJECTIVE
Interval History:   Patient reports significant abdominal pain this morning as well as nausea.  She says the pain medication regime is not working.  She requests better pain control      Medications:  Continuous Infusions:   lactated ringers 75 mL/hr at 04/07/17 1649    morphine       Scheduled Meds:   acetaminophen  1,000 mg Intravenous Q8H    heparin (porcine)  5,000 Units Subcutaneous Q8H    pantoprazole  40 mg Intravenous Daily     PRN Meds:diphenhydrAMINE, hydrALAZINE, naloxone, ondansetron, promethazine (PHENERGAN) IVPB     Review of patient's allergies indicates:   Allergen Reactions    Corticosteroids (glucocorticoids) Nausea Only and Other (See Comments)     Stomach pain, dizziness, headache    Oxycodone Other (See Comments)     Blood pressure dropped     Objective:     Vital Signs (Most Recent):  Temp: 97.8 °F (36.6 °C) (04/08/17 1153)  Pulse: 90 (04/08/17 1153)  Resp: 16 (04/08/17 1153)  BP: (!) 151/68 (04/08/17 1153)  SpO2: (!) 91 % (04/08/17 1153) Vital Signs (24h Range):  Temp:  [97.6 °F (36.4 °C)-98.2 °F (36.8 °C)] 97.8 °F (36.6 °C)  Pulse:  [72-95] 90  Resp:  [10-18] 16  SpO2:  [91 %-100 %] 91 %  BP: (138-191)/(59-96) 151/68     Weight: 82.6 kg (182 lb)  Body mass index is 31.24 kg/(m^2).    Intake/Output - Last 3 Shifts       04/06 0700 - 04/07 0659 04/07 0700 - 04/08 0659 04/08 0700 - 04/09 0659    P.O. 560 0     I.V. (mL/kg) 2171.3 (26.3) 2753.3 (33.4)     IV Piggyback  300     Total Intake(mL/kg) 2731.3 (33.1) 3053.3 (37)     Urine (mL/kg/hr) 200 (0.1) 1425 (0.7)     Stool  0 (0)     Blood  135 (0.1)     Total Output 200 1560      Net +2531.3 +1493.3             Urine Occurrence 3 x 1 x     Stool Occurrence  0 x           Physical Exam   Constitutional: No distress.   Overweight   HENT:   Head: Normocephalic.   Neck: Neck supple.   Cardiovascular: Normal rate, regular rhythm and normal heart sounds.    Pulmonary/Chest: Effort normal.   Abdominal: Soft. Bowel sounds are normal. She exhibits  no distension. There is tenderness (incisional).   The abdominal incision is dressed and the dressing is dry   Neurological: She is alert.   Skin: Skin is warm and dry.   Vitals reviewed.      Significant Labs:  CBC:   Recent Labs  Lab 04/07/17  1447   WBC 19.07*   RBC 3.77*   HGB 10.7*   HCT 33.6*      MCV 89   MCH 28.4   MCHC 31.8*     BMP:   Recent Labs  Lab 04/07/17  1447   *      K 3.9      CO2 21*   BUN 13   CREATININE 1.0   CALCIUM 8.5*   MG 1.7     CMP:   Recent Labs  Lab 04/05/17  0807  04/07/17  1447   *  < > 124*   CALCIUM 10.0  < > 8.5*   ALBUMIN 4.2  --   --    PROT 8.6*  --   --      < > 138   K 4.6  < > 3.9   CO2 24  < > 21*   CL 98  < > 106   BUN 17  < > 13   CREATININE 1.1  < > 1.0   ALKPHOS 81  --   --    ALT 22  --   --    AST 37  --   --    BILITOT 0.4  --   --    < > = values in this interval not displayed.    Significant Diagnostics:  none

## 2017-04-08 NOTE — PROGRESS NOTES
Spoke with Dr. Moulton about pts respirations dropping from 8-10 a min and CO2 detector going off and dropping. He is not concerned with the PCA of morphine. He said the machine will shut itself off if it the respirations and CO2 drop to much. Dr. Moulton stated that if there seems to be more of an issue to call Dr. Shah who is on tonight but was in the case with him during her surgery and knows her well. Dr. Moulton did not see a concern.

## 2017-04-08 NOTE — PROGRESS NOTES
"[facility] Hospital Medicine  Progress Note    Patient Name: Violet Swenson  MRN: [id number]  Patient Class: IP- Inpatient   Admission Date: 4/5/2017  Length of Stay: 3 days  Attending Physician: Kellee Joshi MD  Primary Care Provider: Marti Coronel MD        Subjective:     Principal Problem:Lower abdominal pain    HPI:  Violet Swenson is a 77 year old female with a PMHx of Hypothyroidism, HTN, HLD, GERD, Depression, GERD, Depression, CAD, Gastric bypass '93, PPM, who presented to the ER with c/o generalized abdominal pain for 2 days. Pain describes as "cramping." Associated symptoms included: nausea, vomiting, and chest pain.  ED workup revealed:  WBC 13.25, glucose 119, lipase 351, amylase 189, troponin 0.179. CT abdomen/pelvis with soft tissue mass in the RUQ, recommend endoscopy with biopsy. CXR negative. ED discussed case with Cardiology.     Hospital Course:  Colonoscopy completed with biopsy of mass in the terminal ileum. General surgery was consulted. Awaiting pathology results. 4/7/17 PAUL Performed right gisele-colectomy with terminal ileum and jejunal resection. Patient was placed on PCA pump for better pain control. Echo showed EF 40%. Discussed with PAUL Carver    Interval History: Still nauseated, and passing flatus. Pathology pending.    Review of Systems   Constitutional: Negative.  Negative for appetite change, chills, fatigue and fever.   HENT: Negative.  Negative for congestion, ear discharge, facial swelling, sore throat, trouble swallowing and voice change.    Eyes: Negative.  Negative for photophobia, pain, discharge, redness and visual disturbance.   Respiratory: Negative.  Negative for apnea, cough, choking, chest tightness, shortness of breath, wheezing and stridor.    Cardiovascular: Positive for chest pain. Negative for palpitations and leg swelling.   Gastrointestinal: Positive for abdominal pain and nausea. Negative for abdominal " distention, anal bleeding, blood in stool, constipation, diarrhea, rectal pain and vomiting.        Passing flatus   Endocrine: Negative.  Negative for cold intolerance, heat intolerance, polydipsia, polyphagia and polyuria.   Genitourinary: Negative.  Negative for difficulty urinating, dysuria, flank pain, frequency, hematuria, pelvic pain, urgency, vaginal bleeding and vaginal discharge.   Musculoskeletal: Negative.  Negative for arthralgias, back pain, gait problem, joint swelling, myalgias and neck pain.   Skin: Negative for color change, pallor, rash and wound.        Surgical dressing mid line abdomen   Allergic/Immunologic: Negative.  Negative for food allergies and immunocompromised state.   Neurological: Negative for dizziness, tremors, seizures, syncope, facial asymmetry, speech difficulty, weakness, light-headedness, numbness and headaches.   Hematological: Negative.  Negative for adenopathy. Does not bruise/bleed easily.   Psychiatric/Behavioral: Negative.  Negative for agitation, confusion, hallucinations, sleep disturbance and suicidal ideas. The patient is not nervous/anxious.    All other systems reviewed and are negative.    Objective:     Vital Signs (Most Recent):  Temp: 97.8 °F (36.6 °C) (04/08/17 1153)  Pulse: 90 (04/08/17 1153)  Resp: 16 (04/08/17 1153)  BP: (!) 151/68 (04/08/17 1153)  SpO2: (!) 91 % (04/08/17 1153) Vital Signs (24h Range):  Temp:  [97.6 °F (36.4 °C)-98.2 °F (36.8 °C)] 97.8 °F (36.6 °C)  Pulse:  [72-95] 90  Resp:  [10-18] 16  SpO2:  [91 %-100 %] 91 %  BP: (138-191)/(59-96) 151/68     Weight: 82.6 kg (182 lb)  Body mass index is 31.24 kg/(m^2).    Intake/Output Summary (Last 24 hours) at 04/08/17 1329  Last data filed at 04/08/17 0656   Gross per 24 hour   Intake          1803.25 ml   Output             1420 ml   Net           383.25 ml      Physical Exam   Constitutional: She is oriented to person, place, and time. She appears well-developed and well-nourished. No distress.    Overweight   HENT:   Head: Normocephalic and atraumatic.   Nose: Nose normal.   Eyes: Conjunctivae and EOM are normal. Pupils are equal, round, and reactive to light. Right eye exhibits no discharge. Left eye exhibits no discharge.   Neck: Normal range of motion. Neck supple. No JVD present. No tracheal deviation present. No thyromegaly present.   Cardiovascular: Normal rate, regular rhythm and normal heart sounds.  Exam reveals no gallop and no friction rub.    No murmur heard.  Pulmonary/Chest: Effort normal and breath sounds normal. No stridor. No respiratory distress. She has no wheezes. She has no rales. She exhibits no tenderness.   Abdominal: Soft. She exhibits no distension and no mass. There is tenderness (incisional). There is no guarding.   The abdominal incision is dressed and the dressing is dry. Absent bowel sounds   Musculoskeletal: Normal range of motion. She exhibits no edema, tenderness or deformity.   Lymphadenopathy:     She has no cervical adenopathy.   Neurological: She is alert and oriented to person, place, and time. She has normal reflexes. No cranial nerve deficit. Coordination normal.   Skin: Skin is warm and dry. No rash noted. She is not diaphoretic. No erythema. No pallor.   Psychiatric: She has a normal mood and affect. Her behavior is normal. Judgment and thought content normal.   Nursing note and vitals reviewed.      Significant Labs:   CBC:   Recent Labs  Lab 04/07/17  0443 04/07/17  1311 04/07/17  1447   WBC 6.39  --  19.07*   HGB 10.5*  --  10.7*   HCT 33.6* 35* 33.6*     --  250     CMP:   Recent Labs  Lab 04/07/17  0443 04/07/17  1447    138   K 4.3 3.9    106   CO2 26 21*   GLU 94 124*   BUN 13 13   CREATININE 1.0 1.0   CALCIUM 9.1 8.5*   ANIONGAP 8 11   EGFRNONAA 55* 55*       Significant Imaging: I have reviewed all pertinent imaging results/findings within the past 24 hours.    Assessment/Plan:      * Lower abdominal pain  - Admit to Inpatient  - CT  abdomen/pelvis revealed:  Soft tissue mass in the RLQ. Colonoscopy with biopsy completed.   - Gastroenterology following   - General Surgery consulted: 4/7/2017 for exploratory laparotomy: right gisele colectomy with terminal ileum and jejunal resection   -PCA pump, Analgesics/Antiemetic prn        Malignant neoplasm of ileocecal valve  -pathology pending      CAD, multiple vessel  - Continue ASA and Statin   -last heart Cath 2015 with patent stents    Ischemic cardiomyopathy  ECHO EF 40%  -monitor fluid balance  -discussed with Dr. Shah  -Metoprolol indicated: CCB not indicated in cardiomyopathy      Essential hypertension  - Stable  - Continue home medications      Hyperlipidemia  - Continue Statin      Hypothyroidism (acquired)  - Obtain TSH  - Resume Levothyroxine      Chest pain  - Cardiology following  - 2D ECHO showed EF 40 % with diastolic dysfunction   - Morphine prn  - Supplemental oxygen prn, keep O2 sats > 92%  - SL Nitro prn  - Continue ASA and Statin  -BB, CCB not indicated      VTE Risk Mitigation         Ordered     heparin (porcine) injection 5,000 Units  Every 8 hours     Route:  Subcutaneous        04/07/17 1554     Medium Risk of VTE  Once      04/07/17 1554     Place sequential compression device  Until discontinued      04/07/17 1447          Sofia Liao NP  Department of Hospital Medicine   [facility]

## 2017-04-08 NOTE — ASSESSMENT & PLAN NOTE
- Admit to Inpatient  - CT abdomen/pelvis revealed:  Soft tissue mass in the RLQ. Colonoscopy with biopsy completed.   - Gastroenterology following   - General Surgery consulted: 4/7/2017 for exploratory laparotomy: right gisele colectomy with terminal ileum and jejunal resection   -PCA pump, Analgesics/Antiemetic prn

## 2017-04-08 NOTE — ASSESSMENT & PLAN NOTE
Partially obstructing mass of terminal ileum  Patient underwent a right hemicolectomy and jejunal resection for a presumed malignancy    Pain control and IV fluids

## 2017-04-08 NOTE — ASSESSMENT & PLAN NOTE
- Cardiology following  - 2D ECHO showed EF 40 % with diastolic dysfunction   - Morphine prn  - Supplemental oxygen prn, keep O2 sats > 92%  - SL Nitro prn  - Continue ASA and Statin  -BB, CCB not indicated

## 2017-04-08 NOTE — PROGRESS NOTES
[facility]   General Surgery  Progress Note    Subjective:     History of Present Illness:  76 y/o female referred for partially obstructing mass of the terminal ileum/cecum. She presented with a few day history of worsening right lower quadrant abdominal pain, nausea/emesis. She underwent CT scan concerning for ileocecal mass and subsequent colonoscopy showed partially obstructing mass of the terminal ileum.  She has 30lb weight loss in recent months, denies any fever/chills, diarrhea, constipation. She currently reports crampy abdominal pain in the RLQ. Previously had gastric bypass, cholecystectomy, appendectomy.    Post-Op Info:  Procedure(s) (LRB):  EXPLORATORY-LAPAROTOMY (N/A)  COLECTOMY-PARTIAL (N/A)  LYSIS-ADHESION (N/A)   1 Day Post-Op     Interval History:   Patient reports significant abdominal pain this morning as well as nausea.  She says the pain medication regime is not working.  She requests better pain control      Medications:  Continuous Infusions:   lactated ringers 75 mL/hr at 04/07/17 1649    morphine       Scheduled Meds:   acetaminophen  1,000 mg Intravenous Q8H    heparin (porcine)  5,000 Units Subcutaneous Q8H    pantoprazole  40 mg Intravenous Daily     PRN Meds:diphenhydrAMINE, hydrALAZINE, naloxone, ondansetron, promethazine (PHENERGAN) IVPB     Review of patient's allergies indicates:   Allergen Reactions    Corticosteroids (glucocorticoids) Nausea Only and Other (See Comments)     Stomach pain, dizziness, headache    Oxycodone Other (See Comments)     Blood pressure dropped     Objective:     Vital Signs (Most Recent):  Temp: 97.8 °F (36.6 °C) (04/08/17 1153)  Pulse: 90 (04/08/17 1153)  Resp: 16 (04/08/17 1153)  BP: (!) 151/68 (04/08/17 1153)  SpO2: (!) 91 % (04/08/17 1153) Vital Signs (24h Range):  Temp:  [97.6 °F (36.4 °C)-98.2 °F (36.8 °C)] 97.8 °F (36.6 °C)  Pulse:  [72-95] 90  Resp:  [10-18] 16  SpO2:  [91 %-100 %] 91 %  BP: (138-191)/(59-96) 151/68      Weight: 82.6 kg (182 lb)  Body mass index is 31.24 kg/(m^2).    Intake/Output - Last 3 Shifts       04/06 0700 - 04/07 0659 04/07 0700 - 04/08 0659 04/08 0700 - 04/09 0659    P.O. 560 0     I.V. (mL/kg) 2171.3 (26.3) 2753.3 (33.4)     IV Piggyback  300     Total Intake(mL/kg) 2731.3 (33.1) 3053.3 (37)     Urine (mL/kg/hr) 200 (0.1) 1425 (0.7)     Stool  0 (0)     Blood  135 (0.1)     Total Output 200 1560      Net +2531.3 +1493.3             Urine Occurrence 3 x 1 x     Stool Occurrence  0 x           Physical Exam   Constitutional: No distress.   Overweight   HENT:   Head: Normocephalic.   Neck: Neck supple.   Cardiovascular: Normal rate, regular rhythm and normal heart sounds.    Pulmonary/Chest: Effort normal.   Abdominal: Soft. Bowel sounds are normal. She exhibits no distension. There is tenderness (incisional).   The abdominal incision is dressed and the dressing is dry   Neurological: She is alert.   Skin: Skin is warm and dry.   Vitals reviewed.      Significant Labs:  CBC:   Recent Labs  Lab 04/07/17  1447   WBC 19.07*   RBC 3.77*   HGB 10.7*   HCT 33.6*      MCV 89   MCH 28.4   MCHC 31.8*     BMP:   Recent Labs  Lab 04/07/17  1447   *      K 3.9      CO2 21*   BUN 13   CREATININE 1.0   CALCIUM 8.5*   MG 1.7     CMP:   Recent Labs  Lab 04/05/17  0807  04/07/17  1447   *  < > 124*   CALCIUM 10.0  < > 8.5*   ALBUMIN 4.2  --   --    PROT 8.6*  --   --      < > 138   K 4.6  < > 3.9   CO2 24  < > 21*   CL 98  < > 106   BUN 17  < > 13   CREATININE 1.1  < > 1.0   ALKPHOS 81  --   --    ALT 22  --   --    AST 37  --   --    BILITOT 0.4  --   --    < > = values in this interval not displayed.    Significant Diagnostics:  none    Assessment/Plan:     * Lower abdominal pain  Partially obstructing mass of terminal ileum  Patient underwent a right hemicolectomy and jejunal resection for a presumed malignancy    Pain control and IV fluids    Essential hypertension  Home  medications for blood pressure control    Hyperlipidemia  Home medications once a diet as tolerated    Hypothyroidism (acquired)  Continue Synthroid either IV or orally    CAD, multiple vessel  Monitor for signs of chest pain      Súal Shah MD  General Surgery  [facility]

## 2017-04-08 NOTE — SUBJECTIVE & OBJECTIVE
Interval History: Still nauseated, and passing flatus. Pathology pending.    Review of Systems   Constitutional: Negative.  Negative for appetite change, chills, fatigue and fever.   HENT: Negative.  Negative for congestion, ear discharge, facial swelling, sore throat, trouble swallowing and voice change.    Eyes: Negative.  Negative for photophobia, pain, discharge, redness and visual disturbance.   Respiratory: Negative.  Negative for apnea, cough, choking, chest tightness, shortness of breath, wheezing and stridor.    Cardiovascular: Positive for chest pain. Negative for palpitations and leg swelling.   Gastrointestinal: Positive for abdominal pain and nausea. Negative for abdominal distention, anal bleeding, blood in stool, constipation, diarrhea, rectal pain and vomiting.        Passing flatus   Endocrine: Negative.  Negative for cold intolerance, heat intolerance, polydipsia, polyphagia and polyuria.   Genitourinary: Negative.  Negative for difficulty urinating, dysuria, flank pain, frequency, hematuria, pelvic pain, urgency, vaginal bleeding and vaginal discharge.   Musculoskeletal: Negative.  Negative for arthralgias, back pain, gait problem, joint swelling, myalgias and neck pain.   Skin: Negative for color change, pallor, rash and wound.        Surgical dressing mid line abdomen   Allergic/Immunologic: Negative.  Negative for food allergies and immunocompromised state.   Neurological: Negative for dizziness, tremors, seizures, syncope, facial asymmetry, speech difficulty, weakness, light-headedness, numbness and headaches.   Hematological: Negative.  Negative for adenopathy. Does not bruise/bleed easily.   Psychiatric/Behavioral: Negative.  Negative for agitation, confusion, hallucinations, sleep disturbance and suicidal ideas. The patient is not nervous/anxious.    All other systems reviewed and are negative.    Objective:     Vital Signs (Most Recent):  Temp: 97.8 °F (36.6 °C) (04/08/17 1153)  Pulse: 90  (04/08/17 1153)  Resp: 16 (04/08/17 1153)  BP: (!) 151/68 (04/08/17 1153)  SpO2: (!) 91 % (04/08/17 1153) Vital Signs (24h Range):  Temp:  [97.6 °F (36.4 °C)-98.2 °F (36.8 °C)] 97.8 °F (36.6 °C)  Pulse:  [72-95] 90  Resp:  [10-18] 16  SpO2:  [91 %-100 %] 91 %  BP: (138-191)/(59-96) 151/68     Weight: 82.6 kg (182 lb)  Body mass index is 31.24 kg/(m^2).    Intake/Output Summary (Last 24 hours) at 04/08/17 1329  Last data filed at 04/08/17 0656   Gross per 24 hour   Intake          1803.25 ml   Output             1420 ml   Net           383.25 ml      Physical Exam   Constitutional: She is oriented to person, place, and time. She appears well-developed and well-nourished. No distress.   Overweight   HENT:   Head: Normocephalic and atraumatic.   Nose: Nose normal.   Eyes: Conjunctivae and EOM are normal. Pupils are equal, round, and reactive to light. Right eye exhibits no discharge. Left eye exhibits no discharge.   Neck: Normal range of motion. Neck supple. No JVD present. No tracheal deviation present. No thyromegaly present.   Cardiovascular: Normal rate, regular rhythm and normal heart sounds.  Exam reveals no gallop and no friction rub.    No murmur heard.  Pulmonary/Chest: Effort normal and breath sounds normal. No stridor. No respiratory distress. She has no wheezes. She has no rales. She exhibits no tenderness.   Abdominal: Soft. She exhibits no distension and no mass. There is tenderness (incisional). There is no guarding.   The abdominal incision is dressed and the dressing is dry. Absent bowel sounds   Musculoskeletal: Normal range of motion. She exhibits no edema, tenderness or deformity.   Lymphadenopathy:     She has no cervical adenopathy.   Neurological: She is alert and oriented to person, place, and time. She has normal reflexes. No cranial nerve deficit. Coordination normal.   Skin: Skin is warm and dry. No rash noted. She is not diaphoretic. No erythema. No pallor.   Psychiatric: She has a normal  mood and affect. Her behavior is normal. Judgment and thought content normal.   Nursing note and vitals reviewed.      Significant Labs:   CBC:   Recent Labs  Lab 04/07/17  0443 04/07/17  1311 04/07/17  1447   WBC 6.39  --  19.07*   HGB 10.5*  --  10.7*   HCT 33.6* 35* 33.6*     --  250     CMP:   Recent Labs  Lab 04/07/17  0443 04/07/17  1447    138   K 4.3 3.9    106   CO2 26 21*   GLU 94 124*   BUN 13 13   CREATININE 1.0 1.0   CALCIUM 9.1 8.5*   ANIONGAP 8 11   EGFRNONAA 55* 55*       Significant Imaging: I have reviewed all pertinent imaging results/findings within the past 24 hours.

## 2017-04-09 LAB
ANION GAP SERPL CALC-SCNC: 6 MMOL/L
BASOPHILS # BLD AUTO: 0.01 K/UL
BASOPHILS NFR BLD: 0.1 %
BUN SERPL-MCNC: 13 MG/DL
CALCIUM SERPL-MCNC: 8.9 MG/DL
CHLORIDE SERPL-SCNC: 106 MMOL/L
CO2 SERPL-SCNC: 27 MMOL/L
CREAT SERPL-MCNC: 1 MG/DL
DIFFERENTIAL METHOD: ABNORMAL
EOSINOPHIL # BLD AUTO: 0.1 K/UL
EOSINOPHIL NFR BLD: 1.1 %
ERYTHROCYTE [DISTWIDTH] IN BLOOD BY AUTOMATED COUNT: 16.7 %
EST. GFR  (AFRICAN AMERICAN): >60 ML/MIN/1.73 M^2
EST. GFR  (NON AFRICAN AMERICAN): 55 ML/MIN/1.73 M^2
GLUCOSE SERPL-MCNC: 132 MG/DL
HCT VFR BLD AUTO: 28.5 %
HGB BLD-MCNC: 8.9 G/DL
LYMPHOCYTES # BLD AUTO: 1.9 K/UL
LYMPHOCYTES NFR BLD: 20.4 %
MAGNESIUM SERPL-MCNC: 1.8 MG/DL
MCH RBC QN AUTO: 28 PG
MCHC RBC AUTO-ENTMCNC: 31.2 %
MCV RBC AUTO: 90 FL
MONOCYTES # BLD AUTO: 1 K/UL
MONOCYTES NFR BLD: 11.4 %
NEUTROPHILS # BLD AUTO: 6.1 K/UL
NEUTROPHILS NFR BLD: 67 %
PHOSPHATE SERPL-MCNC: 2.6 MG/DL
PLATELET # BLD AUTO: 268 K/UL
PMV BLD AUTO: 10.1 FL
POTASSIUM SERPL-SCNC: 4.6 MMOL/L
RBC # BLD AUTO: 3.18 M/UL
SODIUM SERPL-SCNC: 139 MMOL/L
WBC # BLD AUTO: 9.05 K/UL

## 2017-04-09 PROCEDURE — 94761 N-INVAS EAR/PLS OXIMETRY MLT: CPT

## 2017-04-09 PROCEDURE — 83735 ASSAY OF MAGNESIUM: CPT

## 2017-04-09 PROCEDURE — 63600175 PHARM REV CODE 636 W HCPCS: Performed by: EMERGENCY MEDICINE

## 2017-04-09 PROCEDURE — 99231 SBSQ HOSP IP/OBS SF/LOW 25: CPT | Mod: ,,, | Performed by: INTERNAL MEDICINE

## 2017-04-09 PROCEDURE — 21400001 HC TELEMETRY ROOM

## 2017-04-09 PROCEDURE — 25000003 PHARM REV CODE 250: Performed by: NURSE PRACTITIONER

## 2017-04-09 PROCEDURE — 85025 COMPLETE CBC W/AUTO DIFF WBC: CPT

## 2017-04-09 PROCEDURE — 25000003 PHARM REV CODE 250: Performed by: SURGERY

## 2017-04-09 PROCEDURE — 80048 BASIC METABOLIC PNL TOTAL CA: CPT

## 2017-04-09 PROCEDURE — 94799 UNLISTED PULMONARY SVC/PX: CPT

## 2017-04-09 PROCEDURE — 84100 ASSAY OF PHOSPHORUS: CPT

## 2017-04-09 PROCEDURE — 36415 COLL VENOUS BLD VENIPUNCTURE: CPT

## 2017-04-09 PROCEDURE — 63600175 PHARM REV CODE 636 W HCPCS: Performed by: NURSE PRACTITIONER

## 2017-04-09 PROCEDURE — 63600175 PHARM REV CODE 636 W HCPCS: Performed by: SURGERY

## 2017-04-09 PROCEDURE — C9113 INJ PANTOPRAZOLE SODIUM, VIA: HCPCS | Performed by: EMERGENCY MEDICINE

## 2017-04-09 RX ORDER — HYDROCODONE BITARTRATE AND ACETAMINOPHEN 5; 325 MG/1; MG/1
1 TABLET ORAL EVERY 4 HOURS PRN
Status: DISCONTINUED | OUTPATIENT
Start: 2017-04-09 | End: 2017-04-14 | Stop reason: HOSPADM

## 2017-04-09 RX ORDER — BISACODYL 5 MG
10 TABLET, DELAYED RELEASE (ENTERIC COATED) ORAL ONCE
Status: COMPLETED | OUTPATIENT
Start: 2017-04-09 | End: 2017-04-09

## 2017-04-09 RX ORDER — AMOXICILLIN 250 MG
2 CAPSULE ORAL DAILY
Status: DISCONTINUED | OUTPATIENT
Start: 2017-04-09 | End: 2017-04-09

## 2017-04-09 RX ORDER — METOPROLOL TARTRATE 25 MG/1
25 TABLET, FILM COATED ORAL 2 TIMES DAILY
Status: DISCONTINUED | OUTPATIENT
Start: 2017-04-09 | End: 2017-04-14 | Stop reason: HOSPADM

## 2017-04-09 RX ADMIN — HYDROCODONE BITARTRATE AND ACETAMINOPHEN 1 TABLET: 5; 325 TABLET ORAL at 11:04

## 2017-04-09 RX ADMIN — GABAPENTIN 100 MG: 100 CAPSULE ORAL at 05:04

## 2017-04-09 RX ADMIN — BISACODYL 10 MG: 5 TABLET, COATED ORAL at 11:04

## 2017-04-09 RX ADMIN — METOPROLOL TARTRATE 25 MG: 25 TABLET ORAL at 09:04

## 2017-04-09 RX ADMIN — HEPARIN SODIUM 5000 UNITS: 5000 INJECTION, SOLUTION INTRAVENOUS; SUBCUTANEOUS at 02:04

## 2017-04-09 RX ADMIN — MORPHINE SULFATE 2 MG: 2 INJECTION, SOLUTION INTRAMUSCULAR; INTRAVENOUS at 05:04

## 2017-04-09 RX ADMIN — ACETAMINOPHEN 1000 MG: 10 INJECTION, SOLUTION INTRAVENOUS at 05:04

## 2017-04-09 RX ADMIN — MORPHINE SULFATE 2 MG: 2 INJECTION, SOLUTION INTRAMUSCULAR; INTRAVENOUS at 12:04

## 2017-04-09 RX ADMIN — MORPHINE SULFATE 2 MG: 2 INJECTION, SOLUTION INTRAMUSCULAR; INTRAVENOUS at 09:04

## 2017-04-09 RX ADMIN — HYDROCODONE BITARTRATE AND ACETAMINOPHEN 1 TABLET: 5; 325 TABLET ORAL at 02:04

## 2017-04-09 RX ADMIN — MORPHINE SULFATE 2 MG: 2 INJECTION, SOLUTION INTRAMUSCULAR; INTRAVENOUS at 11:04

## 2017-04-09 RX ADMIN — HEPARIN SODIUM 5000 UNITS: 5000 INJECTION, SOLUTION INTRAVENOUS; SUBCUTANEOUS at 09:04

## 2017-04-09 RX ADMIN — MORPHINE SULFATE 2 MG: 2 INJECTION, SOLUTION INTRAMUSCULAR; INTRAVENOUS at 06:04

## 2017-04-09 RX ADMIN — PANTOPRAZOLE SODIUM 40 MG: 40 INJECTION, POWDER, FOR SOLUTION INTRAVENOUS at 09:04

## 2017-04-09 RX ADMIN — SERTRALINE HYDROCHLORIDE 100 MG: 50 TABLET ORAL at 09:04

## 2017-04-09 RX ADMIN — HEPARIN SODIUM 5000 UNITS: 5000 INJECTION, SOLUTION INTRAVENOUS; SUBCUTANEOUS at 05:04

## 2017-04-09 RX ADMIN — ONDANSETRON 4 MG: 2 INJECTION INTRAMUSCULAR; INTRAVENOUS at 06:04

## 2017-04-09 RX ADMIN — MORPHINE SULFATE 2 MG: 2 INJECTION, SOLUTION INTRAMUSCULAR; INTRAVENOUS at 08:04

## 2017-04-09 RX ADMIN — GABAPENTIN 100 MG: 100 CAPSULE ORAL at 02:04

## 2017-04-09 RX ADMIN — POTASSIUM CHLORIDE, DEXTROSE MONOHYDRATE AND SODIUM CHLORIDE: 150; 5; 900 INJECTION, SOLUTION INTRAVENOUS at 06:04

## 2017-04-09 RX ADMIN — GABAPENTIN 100 MG: 100 CAPSULE ORAL at 09:04

## 2017-04-09 RX ADMIN — ONDANSETRON 4 MG: 2 INJECTION INTRAMUSCULAR; INTRAVENOUS at 09:04

## 2017-04-09 RX ADMIN — LEVOTHYROXINE SODIUM 75 MCG: 75 TABLET ORAL at 05:04

## 2017-04-09 NOTE — ASSESSMENT & PLAN NOTE
Post op day 2  Dueñas catheter has been removed  Pain is better controlled  Physical therapy note reviewed and home health/home medications   Dulcolax  Oral and IV narcotics    Results are available as that information is needed by the oncology service to make their final recommendation

## 2017-04-09 NOTE — PLAN OF CARE
Problem: Patient Care Overview  Goal: Plan of Care Review  Outcome: Ongoing (interventions implemented as appropriate)  Patient has had little rest tonight due to PCA pump alarms due to mouth breathing and IV alarms. Attempting to manage patient's pain. Patient seems to be tolerating discontinuation of PCA pump and initiation of 2 mg IV morphine. Patient has been resting better since the change in pain medication administration.

## 2017-04-09 NOTE — PT/OT/SLP PROGRESS
Physical Therapy      Violet Swenson  MRN: [id number]    Patient not seen today secondary to Increased agitation, Patient fatigue (on first attempt pt c/o IV pain at site. Second attempt, pt lethargic and unable to keep eyes open for tx. ). Will follow-up per PT POC.    Di Prasad, PTA

## 2017-04-09 NOTE — PROGRESS NOTES
[facility] -   General Surgery  Progress Note    Subjective:     History of Present Illness:  78 y/o female referred for partially obstructing mass of the terminal ileum/cecum. She presented with a few day history of worsening right lower quadrant abdominal pain, nausea/emesis. She underwent CT scan concerning for ileocecal mass and subsequent colonoscopy showed partially obstructing mass of the terminal ileum.  She has 30lb weight loss in recent months, denies any fever/chills, diarrhea, constipation. She currently reports crampy abdominal pain in the RLQ. Previously had gastric bypass, cholecystectomy, appendectomy.    Post-Op Info:  Procedure(s) (LRB):  EXPLORATORY-LAPAROTOMY (N/A)  COLECTOMY-PARTIAL (N/A)  LYSIS-ADHESION (N/A)   2 Days Post-Op     Interval History: Pain is much better controlled.    Has been able to get up and use the restroom.    Is wondering about an oncology consultation and the timing of that    Medications:  Continuous Infusions:   dextrose 5 % and 0.9 % NaCl with KCl 20 mEq 75 mL/hr at 04/08/17 1511     Scheduled Meds:   acetaminophen  1,000 mg Intravenous Q8H    bisacodyl  10 mg Oral Once    gabapentin  100 mg Oral TID    heparin (porcine)  5,000 Units Subcutaneous Q8H    levothyroxine  75 mcg Oral Before breakfast    pantoprazole  40 mg Intravenous Daily    sertraline  100 mg Oral Daily    verapamil  120 mg Oral Nightly     PRN Meds:diphenhydrAMINE, hydrALAZINE, hydrocodone-acetaminophen 5-325mg, morphine, ondansetron, promethazine (PHENERGAN) IVPB     Review of patient's allergies indicates:   Allergen Reactions    Corticosteroids (glucocorticoids) Nausea Only and Other (See Comments)     Stomach pain, dizziness, headache    Oxycodone Other (See Comments)     Blood pressure dropped     Objective:     Vital Signs (Most Recent):  Temp: 98.6 °F (37 °C) (04/09/17 0846)  Pulse: 78 (04/09/17 0846)  Resp: 18 (04/09/17 0846)  BP: (!) 146/78 (04/09/17 0846)  SpO2: (!) 94  % (04/09/17 0846) Vital Signs (24h Range):  Temp:  [97.8 °F (36.6 °C)-98.7 °F (37.1 °C)] 98.6 °F (37 °C)  Pulse:  [78-94] 78  Resp:  [10-20] 18  SpO2:  [91 %-98 %] 94 %  BP: (143-163)/(66-85) 146/78     Weight: 82.6 kg (182 lb)  Body mass index is 31.24 kg/(m^2).    Intake/Output - Last 3 Shifts       04/07 0700 - 04/08 0659 04/08 0700 - 04/09 0659 04/09 0700 - 04/10 0659    P.O. 0 0     I.V. (mL/kg) 2753.3 (33.4) 1090 (13.2)     IV Piggyback 300 200     Total Intake(mL/kg) 3053.3 (37) 1290 (15.6)     Urine (mL/kg/hr) 1425 (0.7) 400 (0.2)     Stool 0 (0)      Blood 135 (0.1)      Total Output 1560 400      Net +1493.3 +890             Urine Occurrence 1 x 2 x     Stool Occurrence 0 x 0 x           Physical Exam   Constitutional: No distress.   Much more comfortable   HENT:   Head: Normocephalic and atraumatic.   Neck: Normal range of motion. Neck supple.   Cardiovascular: Normal rate, regular rhythm and normal heart sounds.  Exam reveals no friction rub.    No murmur heard.  Pulmonary/Chest: Effort normal and breath sounds normal.   Abdominal: Soft. Bowel sounds are normal. She exhibits no distension. There is no tenderness.   Incision is clean, no erythema or drainage       Significant Labs:  CBC:   Recent Labs  Lab 04/09/17  0615   WBC 9.05   RBC 3.18*   HGB 8.9*   HCT 28.5*      MCV 90   MCH 28.0   MCHC 31.2*     BMP:   Recent Labs  Lab 04/09/17  0615   *      K 4.6      CO2 27   BUN 13   CREATININE 1.0   CALCIUM 8.9   MG 1.8       Significant Diagnostics:  None    Assessment/Plan:     Essential hypertension  Home medications for blood pressure control    Hyperlipidemia  Home medications once a diet as tolerated    Hypothyroidism (acquired)  Continue Synthroid  orally    Malignant neoplasm of ileocecal valve  Post op day 2  Dueñas catheter has been removed  Pain is better controlled  Physical therapy note reviewed and home health/home medications   Dulcolax  Oral and IV  narcotics    Results are available as that information is needed by the oncology service to make their final recommendation      Saúl Shah MD  General Surgery  [facility] [place]

## 2017-04-09 NOTE — SUBJECTIVE & OBJECTIVE
Interval History: pain issue  pca issue do to mouth breathing when asleep  Pain slight better    Medications:  Continuous Infusions:   dextrose 5 % and 0.9 % NaCl with KCl 20 mEq 75 mL/hr at 04/08/17 1511     Scheduled Meds:   acetaminophen  1,000 mg Intravenous Q8H    gabapentin  100 mg Oral TID    heparin (porcine)  5,000 Units Subcutaneous Q8H    levothyroxine  75 mcg Oral Before breakfast    pantoprazole  40 mg Intravenous Daily    sertraline  100 mg Oral Daily    verapamil  120 mg Oral Nightly     PRN Meds:diphenhydrAMINE, hydrALAZINE, morphine, ondansetron, promethazine (PHENERGAN) IVPB     Review of patient's allergies indicates:   Allergen Reactions    Corticosteroids (glucocorticoids) Nausea Only and Other (See Comments)     Stomach pain, dizziness, headache    Oxycodone Other (See Comments)     Blood pressure dropped     Objective:     Vital Signs (Most Recent):  Temp: 98.7 °F (37.1 °C) (04/09/17 0420)  Pulse: 93 (04/09/17 0420)  Resp: 20 (04/09/17 0420)  BP: (!) 148/85 (04/09/17 0420)  SpO2: (!) 94 % (04/09/17 0420) Vital Signs (24h Range):  Temp:  [97.8 °F (36.6 °C)-98.7 °F (37.1 °C)] 98.7 °F (37.1 °C)  Pulse:  [86-94] 93  Resp:  [10-20] 20  SpO2:  [91 %-98 %] 94 %  BP: (143-163)/(66-85) 148/85     Weight: 82.6 kg (182 lb)  Body mass index is 31.24 kg/(m^2).    Intake/Output - Last 3 Shifts       04/07 0700 - 04/08 0659 04/08 0700 - 04/09 0659 04/09 0700 - 04/10 0659    P.O. 0 0     I.V. (mL/kg) 2753.3 (33.4) 1090 (13.2)     IV Piggyback 300 200     Total Intake(mL/kg) 3053.3 (37) 1290 (15.6)     Urine (mL/kg/hr) 1425 (0.7) 400 (0.2)     Stool 0 (0)      Blood 135 (0.1)      Total Output 1560 400      Net +1493.3 +890             Urine Occurrence 1 x 2 x     Stool Occurrence 0 x 0 x           Physical Exam   Constitutional: No distress.   Over weight   HENT:   Head: Normocephalic and atraumatic.   Neck: Normal range of motion.   Cardiovascular: Normal rate, regular rhythm and normal heart  sounds.    Pulmonary/Chest: Effort normal and breath sounds normal. No respiratory distress. She has no wheezes.   Abdominal: Soft. There is tenderness (incision).   incision is clean   Musculoskeletal: Normal range of motion.   Vitals reviewed.      Significant Labs:  CBC:   Recent Labs  Lab 04/09/17  0615   WBC 9.05   RBC 3.18*   HGB 8.9*   HCT 28.5*      MCV 90   MCH 28.0   MCHC 31.2*     BMP:   Recent Labs  Lab 04/09/17  0615   *      K 4.6      CO2 27   BUN 13   CREATININE 1.0   CALCIUM 8.9   MG 1.8       Significant Diagnostics:  none

## 2017-04-09 NOTE — PROGRESS NOTES
Cardiology Progress Note        SUBJECTIVE:     History of Present Illness:  Patient is a 77 y.o. female presents with abd cramp.   Had surgery POD#2 s/p remove small intestine mass,.  Has abd pain. No dyspnea and chest pain.  EKG v-paced.  ECho showed EF 40% with RWMA. LVDD, LAE.    OBJECTIVE:     Vital Signs (Most Recent)  Temp: 98.2 °F (36.8 °C) (04/09/17 1239)  Pulse: 75 (04/09/17 1239)  Resp: 18 (04/09/17 1239)  BP: (!) 143/73 (04/09/17 1239)  SpO2: (!) 90 % (04/09/17 1239)    Vital Signs Range (Last 24H):  Temp:  [97.9 °F (36.6 °C)-98.7 °F (37.1 °C)]   Pulse:  [75-94]   Resp:  [10-20]   BP: (143-163)/(66-85)   SpO2:  [90 %-98 %]     Intake/Output last 3 shifts:  I/O last 3 completed shifts:  In: 2593.3 [I.V.:2093.3; IV Piggyback:500]  Out: 1450 [Urine:1450]    Intake/Output this shift:       Review of patient's allergies indicates:   Allergen Reactions    Corticosteroids (glucocorticoids) Nausea Only and Other (See Comments)     Stomach pain, dizziness, headache    Oxycodone Other (See Comments)     Blood pressure dropped       Current Facility-Administered Medications   Medication    dextrose 5 % and 0.9 % NaCl with KCl 20 mEq infusion    diphenhydrAMINE injection 12.5 mg    gabapentin capsule 100 mg    heparin (porcine) injection 5,000 Units    hydrALAZINE injection 10 mg    hydrocodone-acetaminophen 5-325mg per tablet 1 tablet    levothyroxine tablet 75 mcg    metoprolol tartrate (LOPRESSOR) tablet 25 mg    morphine injection 2 mg    ondansetron injection 4 mg    pantoprazole injection 40 mg    promethazine (PHENERGAN) 6.25 mg in dextrose 5 % 50 mL IVPB    sertraline tablet 100 mg     No current facility-administered medications on file prior to encounter.      Current Outpatient Prescriptions on File Prior to Encounter   Medication Sig    allopurinol (ZYLOPRIM) 100 MG tablet TAKE 2 TABLET BY MOUTH DAILY    aspirin (ECOTRIN) 81 MG EC tablet Take 81 mg by mouth once daily.      baicalin-catechin 500 mg Cap Take 500 mg by mouth 2 (two) times daily.    clopidogrel (PLAVIX) 75 mg tablet Take 1 tablet (75 mg total) by mouth once daily.    COLCRYS 0.6 mg tablet TAKE 1 TABLET(0.6 MG) BY MOUTH EVERY DAY    ergocalciferol, vitamin D2, 400 unit Tab Take by mouth daily.    fluticasone (FLONASE) 50 mcg/actuation nasal spray 2 sprays by Each Nare route once daily.    gabapentin (NEURONTIN) 100 MG capsule Take 100 mg by mouth 3 (three) times daily.    hydrochlorothiazide (HYDRODIURIL) 12.5 MG Tab TAKE 1 TABLET BY MOUTH ONCE DAILY    isosorbide mononitrate (IMDUR) 30 MG 24 hr tablet TAKE 1 TABLET BY MOUTH EVERY DAY    levothyroxine (SYNTHROID) 75 MCG tablet TAKE 1 TABLET(75 MCG) BY MOUTH BEFORE BREAKFAST    meloxicam (MOBIC) 7.5 MG tablet TAKE 1 TABLET BY MOUTH ONCE DAILY AS NEEDED FOR PAIN    multivitamin capsule Take 1 capsule by mouth once daily.    rosuvastatin (CRESTOR) 10 MG tablet Take 1 tablet (10 mg total) by mouth once daily.    sertraline (ZOLOFT) 100 MG tablet Take 1 tablet (100 mg total) by mouth once daily.    turmeric root extract 500 mg Cap Take 500 mg by mouth 2 (two) times daily.    ZINC ACETATE ORAL 250 mg.    nitroglycerin 400 mcg/spray SprA Place onto the tongue.    ranitidine (ZANTAC) 150 MG tablet Take 150 mg by mouth nightly.       Physical Exam:  General appearance: alert, appears stated age and cooperative  Head: Normocephalic, without obvious abnormality, atraumatic  Eyes:  conjunctivae/corneas clear. PERRL, EOM's intact. Fundi benign.  Nose: no discharge  Throat: normal findings: tongue midline and normal  Neck: no adenopathy, no carotid bruit, no JVD, supple, symmetrical, trachea midline and thyroid not enlarged, symmetric, no tenderness/mass/nodules  Back:  no skin lesions, erythema, or scars  Lungs:  clear to auscultation bilaterally  Chest wall: no tenderness  Heart: regular rate and rhythm, S1, S2 normal, SM on left chest   Abdomen: soft, dressing on  place.  Extremities: extremities normal, atraumatic, no cyanosis or edema  Pulses: 2+ and symmetric  Skin: Skin color, texture, turgor normal. No rashes or lesions  Neurologic: Grossly normal    Laboratory:  Chemistry:   Lab Results   Component Value Date     04/09/2017    K 4.6 04/09/2017     04/09/2017    CO2 27 04/09/2017    BUN 13 04/09/2017    CREATININE 1.0 04/09/2017    CALCIUM 8.9 04/09/2017     Cardiac Markers:   Lab Results   Component Value Date    TROPONINI 0.122 (H) 04/06/2017     Cardiac Markers (Last 3):   Lab Results   Component Value Date    TROPONINI 0.122 (H) 04/06/2017    TROPONINI 0.172 (H) 04/05/2017    TROPONINI 0.179 (H) 04/05/2017     CBC:   Lab Results   Component Value Date    WBC 9.05 04/09/2017    HGB 8.9 (L) 04/09/2017    HCT 28.5 (L) 04/09/2017    HCT 35 (L) 04/07/2017    MCV 90 04/09/2017     04/09/2017     Lipids:   Lab Results   Component Value Date    CHOL 138 04/05/2017    TRIG 86 04/05/2017    HDL 58 04/05/2017     Coagulation:   Lab Results   Component Value Date    INR 1.0 04/05/2017    APTT 26.3 04/05/2017       Diagnostic Results:  ECG: Reviewed  X-Ray: Reviewed  US: Reviewed  CT: Reviewed  Echo: Reviewed      ASSESSMENT/PLAN:     Patient Active Problem List   Diagnosis    Essential hypertension    Hyperlipidemia    Hypothyroidism (acquired)    Major depression, chronic    CAD, multiple vessel    Arthritis    S/P coronary artery stent placement    Pacemaker    Renal insufficiency    NSAID long-term use    Idiopathic chronic gout of multiple sites without tophus    Acute idiopathic gout of left foot    Primary osteoarthritis involving multiple joints    Neuropathic pain of right foot    Coronary artery disease involving native coronary artery of native heart without angina pectoris    Stenosing tenosynovitis of finger of left hand    Myofascial pain    Chest pain    Abnormal CT scan, gastrointestinal tract    Lower abdominal pain     Abdominal pain    Malignant neoplasm of ileocecal valve    Ischemic cardiomyopathy   Elevated troponin. Demanding ischemia from abd cramp.  Small intestine  mass  CAD s/p PCI and PPM  Cardiomyopathy EF 40%, euvolemic.  H/O gastric bypass  Strong f/h GI malignancy     Plan:   Continue pain control  Ok to continue metoprolol,   Resume ASA 81 mg if ok with surgery

## 2017-04-09 NOTE — PLAN OF CARE
Problem: Patient Care Overview  Goal: Plan of Care Review  Outcome: Ongoing (interventions implemented as appropriate)  Pt continue on pain medication morphine 2 mg every 2 hours, increase norco 5 mg p.o. For breakthrough pain, ambulation with assist, bathroom privileges, continue IV hydration, NPO except medication and ice chips, normal sinus rhythm, midline incision open to air, encourage use of Incentive spirometer, family participating in plan of care.

## 2017-04-09 NOTE — PROGRESS NOTES
[facility] -   General Surgery  Progress Note    Subjective:     History of Present Illness:  78 y/o female referred for partially obstructing mass of the terminal ileum/cecum. She presented with a few day history of worsening right lower quadrant abdominal pain, nausea/emesis. She underwent CT scan concerning for ileocecal mass and subsequent colonoscopy showed partially obstructing mass of the terminal ileum.  She has 30lb weight loss in recent months, denies any fever/chills, diarrhea, constipation. She currently reports crampy abdominal pain in the RLQ. Previously had gastric bypass, cholecystectomy, appendectomy.    Post-Op Info:  Procedure(s) (LRB):  EXPLORATORY-LAPAROTOMY (N/A)  COLECTOMY-PARTIAL (N/A)  LYSIS-ADHESION (N/A)   2 Days Post-Op     Interval History: pain issue  pca issue do to mouth breathing when asleep  Pain slight better    Medications:  Continuous Infusions:   dextrose 5 % and 0.9 % NaCl with KCl 20 mEq 75 mL/hr at 04/08/17 1511     Scheduled Meds:   acetaminophen  1,000 mg Intravenous Q8H    gabapentin  100 mg Oral TID    heparin (porcine)  5,000 Units Subcutaneous Q8H    levothyroxine  75 mcg Oral Before breakfast    pantoprazole  40 mg Intravenous Daily    sertraline  100 mg Oral Daily    verapamil  120 mg Oral Nightly     PRN Meds:diphenhydrAMINE, hydrALAZINE, morphine, ondansetron, promethazine (PHENERGAN) IVPB     Review of patient's allergies indicates:   Allergen Reactions    Corticosteroids (glucocorticoids) Nausea Only and Other (See Comments)     Stomach pain, dizziness, headache    Oxycodone Other (See Comments)     Blood pressure dropped     Objective:     Vital Signs (Most Recent):  Temp: 98.7 °F (37.1 °C) (04/09/17 0420)  Pulse: 93 (04/09/17 0420)  Resp: 20 (04/09/17 0420)  BP: (!) 148/85 (04/09/17 0420)  SpO2: (!) 94 % (04/09/17 0420) Vital Signs (24h Range):  Temp:  [97.8 °F (36.6 °C)-98.7 °F (37.1 °C)] 98.7 °F (37.1 °C)  Pulse:  [86-94] 93  Resp:   [10-20] 20  SpO2:  [91 %-98 %] 94 %  BP: (143-163)/(66-85) 148/85     Weight: 82.6 kg (182 lb)  Body mass index is 31.24 kg/(m^2).    Intake/Output - Last 3 Shifts       04/07 0700 - 04/08 0659 04/08 0700 - 04/09 0659 04/09 0700 - 04/10 0659    P.O. 0 0     I.V. (mL/kg) 2753.3 (33.4) 1090 (13.2)     IV Piggyback 300 200     Total Intake(mL/kg) 3053.3 (37) 1290 (15.6)     Urine (mL/kg/hr) 1425 (0.7) 400 (0.2)     Stool 0 (0)      Blood 135 (0.1)      Total Output 1560 400      Net +1493.3 +890             Urine Occurrence 1 x 2 x     Stool Occurrence 0 x 0 x           Physical Exam   Constitutional: No distress.   Over weight   HENT:   Head: Normocephalic and atraumatic.   Neck: Normal range of motion.   Cardiovascular: Normal rate, regular rhythm and normal heart sounds.    Pulmonary/Chest: Effort normal and breath sounds normal. No respiratory distress. She has no wheezes.   Abdominal: Soft. There is tenderness (incision).   incision is clean   Musculoskeletal: Normal range of motion.   Vitals reviewed.      Significant Labs:  CBC:   Recent Labs  Lab 04/09/17  0615   WBC 9.05   RBC 3.18*   HGB 8.9*   HCT 28.5*      MCV 90   MCH 28.0   MCHC 31.2*     BMP:   Recent Labs  Lab 04/09/17  0615   *      K 4.6      CO2 27   BUN 13   CREATININE 1.0   CALCIUM 8.9   MG 1.8       Significant Diagnostics:  none    Assessment/Plan:     Essential hypertension  Home medications for blood pressure control    Hyperlipidemia  Home medications once a diet as tolerated    Hypothyroidism (acquired)  Continue Synthroid either IV or orally    Malignant neoplasm of ileocecal valve  Post op day 2  oob  Pt  Control pain  once seen by pt. Need of discharge can be addressed, for discharge mid week    Saúl Shah MD  General Surgery  [facility]

## 2017-04-09 NOTE — PROGRESS NOTES
Spoke to Dr. Shah regarding pt Morphine PCA pump continuously alarming d/t pt mouth breathing. CO2 sensor placed in mouth with no improvement. Orders given to d/c PCA pump and give 2mg morphine IV q2hr PRN for pain. Will continue to monitor.

## 2017-04-09 NOTE — SUBJECTIVE & OBJECTIVE
Interval History: OFF PCA pump. Encouraged to get OOB TID    Review of Systems   Constitutional: Negative.  Negative for appetite change, chills, fatigue and fever.   HENT: Negative.  Negative for congestion, ear discharge, facial swelling, sore throat, trouble swallowing and voice change.    Eyes: Negative.  Negative for photophobia, pain, discharge, redness and visual disturbance.   Respiratory: Negative.  Negative for apnea, cough, choking, chest tightness, shortness of breath, wheezing and stridor.    Cardiovascular: Positive for chest pain. Negative for palpitations and leg swelling.   Gastrointestinal: Positive for abdominal pain and nausea. Negative for abdominal distention, anal bleeding, blood in stool, constipation, diarrhea, rectal pain and vomiting.        Passing flatus   Endocrine: Negative.  Negative for cold intolerance, heat intolerance, polydipsia, polyphagia and polyuria.   Genitourinary: Negative.  Negative for difficulty urinating, dysuria, flank pain, frequency, hematuria, pelvic pain, urgency, vaginal bleeding and vaginal discharge.   Musculoskeletal: Negative.  Negative for arthralgias, back pain, gait problem, joint swelling, myalgias and neck pain.   Skin: Negative for color change, pallor, rash and wound.        Surgical dressing mid line abdomen   Allergic/Immunologic: Negative.  Negative for food allergies and immunocompromised state.   Neurological: Negative for dizziness, tremors, seizures, syncope, facial asymmetry, speech difficulty, weakness, light-headedness, numbness and headaches.   Hematological: Negative.  Negative for adenopathy. Does not bruise/bleed easily.   Psychiatric/Behavioral: Negative.  Negative for agitation, confusion, hallucinations, sleep disturbance and suicidal ideas. The patient is not nervous/anxious.    All other systems reviewed and are negative.    Objective:     Vital Signs (Most Recent):  Temp: 98.2 °F (36.8 °C) (04/09/17 1239)  Pulse: 75 (04/09/17  1239)  Resp: 18 (04/09/17 1239)  BP: (!) 143/73 (04/09/17 1239)  SpO2: (!) 90 % (04/09/17 1239) Vital Signs (24h Range):  Temp:  [97.9 °F (36.6 °C)-98.7 °F (37.1 °C)] 98.2 °F (36.8 °C)  Pulse:  [75-94] 75  Resp:  [10-20] 18  SpO2:  [90 %-98 %] 90 %  BP: (143-163)/(66-85) 143/73     Weight: 82.6 kg (182 lb)  Body mass index is 31.24 kg/(m^2).    Intake/Output Summary (Last 24 hours) at 04/09/17 1259  Last data filed at 04/09/17 0529   Gross per 24 hour   Intake             1290 ml   Output              400 ml   Net              890 ml      Physical Exam   Constitutional: She is oriented to person, place, and time. She appears well-developed and well-nourished. No distress.   Much more comfortable   HENT:   Head: Normocephalic and atraumatic.   Nose: Nose normal.   Eyes: Conjunctivae and EOM are normal. Pupils are equal, round, and reactive to light. Right eye exhibits no discharge. Left eye exhibits no discharge.   Neck: Normal range of motion. Neck supple. No JVD present. No tracheal deviation present. No thyromegaly present.   Cardiovascular: Normal rate, regular rhythm and normal heart sounds.  Exam reveals no gallop and no friction rub.    No murmur heard.  Pulmonary/Chest: Effort normal and breath sounds normal. No stridor. No respiratory distress. She has no wheezes. She has no rales. She exhibits no tenderness.   Abdominal: Soft. Bowel sounds are normal. She exhibits no distension and no mass. There is no tenderness. There is no guarding.   Incision is clean, no erythema or drainage   Musculoskeletal: Normal range of motion. She exhibits no edema, tenderness or deformity.   Lymphadenopathy:     She has no cervical adenopathy.   Neurological: She is alert and oriented to person, place, and time. She has normal reflexes. No cranial nerve deficit. Coordination normal.   Skin: Skin is warm and dry. No rash noted. She is not diaphoretic. No erythema. No pallor.   Psychiatric: She has a normal mood and affect. Her  behavior is normal. Judgment and thought content normal.   Nursing note and vitals reviewed.      Significant Labs:   CBC:   Recent Labs  Lab 04/07/17  1311 04/07/17  1447 04/09/17  0615   WBC  --  19.07* 9.05   HGB  --  10.7* 8.9*   HCT 35* 33.6* 28.5*   PLT  --  250 268     CMP:   Recent Labs  Lab 04/07/17  1447 04/09/17  0615    139   K 3.9 4.6    106   CO2 21* 27   * 132*   BUN 13 13   CREATININE 1.0 1.0   CALCIUM 8.5* 8.9   ANIONGAP 11 6*   EGFRNONAA 55* 55*       Significant Imaging: I have reviewed all pertinent imaging results/findings within the past 24 hours.

## 2017-04-09 NOTE — PROGRESS NOTES
"[facility] Hospital Medicine  Progress Note    Patient Name: Violet Swenson  MRN: [id number]  Patient Class: IP- Inpatient   Admission Date: 4/5/2017  Length of Stay: 4 days  Attending Physician: Kellee Joshi MD  Primary Care Provider: Marti Coronel MD        Subjective:     Principal Problem:Lower abdominal pain    HPI:  Violet Swenson is a 77 year old female with a PMHx of Hypothyroidism, HTN, HLD, GERD, Depression, GERD, Depression, CAD, Gastric bypass '93, PPM, who presented to the ER with c/o generalized abdominal pain for 2 days. Pain describes as "cramping." Associated symptoms included: nausea, vomiting, and chest pain.  ED workup revealed:  WBC 13.25, glucose 119, lipase 351, amylase 189, troponin 0.179. CT abdomen/pelvis with soft tissue mass in the RUQ, recommend endoscopy with biopsy. CXR negative. ED discussed case with Cardiology.     Hospital Course:  Colonoscopy completed with biopsy of mass in the terminal ileum. General surgery was consulted. Awaiting pathology results. 4/7/17 right gisele-colectomy with terminal ileum and jejunal resection. PCA pump discontinued. Echo showed EF 40%. Discussed with Dr. Shah, General Surgery. Patient encouraged to get out of bed TID. Not passing gas - positive bowel sounds. Verapamil (not indicated in Cardiomyopathy) changed to Metoprolol 25 mg BID.     Interval History: OFF PCA pump. Encouraged to get OOB TID    Review of Systems   Constitutional: Negative.  Negative for appetite change, chills, fatigue and fever.   HENT: Negative.  Negative for congestion, ear discharge, facial swelling, sore throat, trouble swallowing and voice change.    Eyes: Negative.  Negative for photophobia, pain, discharge, redness and visual disturbance.   Respiratory: Negative.  Negative for apnea, cough, choking, chest tightness, shortness of breath, wheezing and stridor.    Cardiovascular: Positive for chest pain. Negative for palpitations " and leg swelling.   Gastrointestinal: Positive for abdominal pain and nausea. Negative for abdominal distention, anal bleeding, blood in stool, constipation, diarrhea, rectal pain and vomiting.        Passing flatus   Endocrine: Negative.  Negative for cold intolerance, heat intolerance, polydipsia, polyphagia and polyuria.   Genitourinary: Negative.  Negative for difficulty urinating, dysuria, flank pain, frequency, hematuria, pelvic pain, urgency, vaginal bleeding and vaginal discharge.   Musculoskeletal: Negative.  Negative for arthralgias, back pain, gait problem, joint swelling, myalgias and neck pain.   Skin: Negative for color change, pallor, rash and wound.        Surgical dressing mid line abdomen   Allergic/Immunologic: Negative.  Negative for food allergies and immunocompromised state.   Neurological: Negative for dizziness, tremors, seizures, syncope, facial asymmetry, speech difficulty, weakness, light-headedness, numbness and headaches.   Hematological: Negative.  Negative for adenopathy. Does not bruise/bleed easily.   Psychiatric/Behavioral: Negative.  Negative for agitation, confusion, hallucinations, sleep disturbance and suicidal ideas. The patient is not nervous/anxious.    All other systems reviewed and are negative.    Objective:     Vital Signs (Most Recent):  Temp: 98.2 °F (36.8 °C) (04/09/17 1239)  Pulse: 75 (04/09/17 1239)  Resp: 18 (04/09/17 1239)  BP: (!) 143/73 (04/09/17 1239)  SpO2: (!) 90 % (04/09/17 1239) Vital Signs (24h Range):  Temp:  [97.9 °F (36.6 °C)-98.7 °F (37.1 °C)] 98.2 °F (36.8 °C)  Pulse:  [75-94] 75  Resp:  [10-20] 18  SpO2:  [90 %-98 %] 90 %  BP: (143-163)/(66-85) 143/73     Weight: 82.6 kg (182 lb)  Body mass index is 31.24 kg/(m^2).    Intake/Output Summary (Last 24 hours) at 04/09/17 1259  Last data filed at 04/09/17 0548   Gross per 24 hour   Intake             1290 ml   Output              400 ml   Net              890 ml      Physical Exam   Constitutional: She is  oriented to person, place, and time. She appears well-developed and well-nourished. No distress.   Much more comfortable   HENT:   Head: Normocephalic and atraumatic.   Nose: Nose normal.   Eyes: Conjunctivae and EOM are normal. Pupils are equal, round, and reactive to light. Right eye exhibits no discharge. Left eye exhibits no discharge.   Neck: Normal range of motion. Neck supple. No JVD present. No tracheal deviation present. No thyromegaly present.   Cardiovascular: Normal rate, regular rhythm and normal heart sounds.  Exam reveals no gallop and no friction rub.    No murmur heard.  Pulmonary/Chest: Effort normal and breath sounds normal. No stridor. No respiratory distress. She has no wheezes. She has no rales. She exhibits no tenderness.   Abdominal: Soft. Bowel sounds are normal. She exhibits no distension and no mass. There is no tenderness. There is no guarding.   Incision is clean, no erythema or drainage   Musculoskeletal: Normal range of motion. She exhibits no edema, tenderness or deformity.   Lymphadenopathy:     She has no cervical adenopathy.   Neurological: She is alert and oriented to person, place, and time. She has normal reflexes. No cranial nerve deficit. Coordination normal.   Skin: Skin is warm and dry. No rash noted. She is not diaphoretic. No erythema. No pallor.   Psychiatric: She has a normal mood and affect. Her behavior is normal. Judgment and thought content normal.   Nursing note and vitals reviewed.      Significant Labs:   CBC:   Recent Labs  Lab 04/07/17  1311 04/07/17  1447 04/09/17  0615   WBC  --  19.07* 9.05   HGB  --  10.7* 8.9*   HCT 35* 33.6* 28.5*   PLT  --  250 268     CMP:   Recent Labs  Lab 04/07/17  1447 04/09/17  0615    139   K 3.9 4.6    106   CO2 21* 27   * 132*   BUN 13 13   CREATININE 1.0 1.0   CALCIUM 8.5* 8.9   ANIONGAP 11 6*   EGFRNONAA 55* 55*       Significant Imaging: I have reviewed all pertinent imaging results/findings within the past  24 hours.    Assessment/Plan:      * Lower abdominal pain  - Admit to Inpatient  - CT abdomen/pelvis revealed:  Soft tissue mass in the RLQ. Colonoscopy with biopsy completed.   - Gastroenterology following   - General Surgery consulted: 4/7/2017 for exploratory laparotomy: right gisele colectomy with terminal ileum and jejunal resection   - Analgesics/Antiemetic prn  -OOB TID        Malignant neoplasm of ileocecal valve  -pathology pending      CAD, multiple vessel  - Continue ASA and Statin   -last heart Cath 2015 with patent stents    Ischemic cardiomyopathy  ECHO EF 40%  -monitor fluid balance  -discussed with Dr. Shah  -Metoprolol indicated: CCB not indicated in cardiomyopathy      Essential hypertension  - Stable  - Continue home medications      Hyperlipidemia  - Continue Statin      Hypothyroidism (acquired)  -TSH nml  - Resume Levothyroxine      Chest pain  - Cardiology following  - 2D ECHO showed EF 40 % with diastolic dysfunction   - Morphine prn  - Supplemental oxygen prn, keep O2 sats > 92%  - SL Nitro prn  - Continue ASA and Statin  -BB, CCB not indicated      Abnormal CT scan, gastrointestinal tract        VTE Risk Mitigation         Ordered     heparin (porcine) injection 5,000 Units  Every 8 hours     Route:  Subcutaneous        04/07/17 1554     Medium Risk of VTE  Once      04/07/17 1554     Place sequential compression device  Until discontinued      04/07/17 1447          Sofia Liao NP  Department of Hospital Medicine   [facility]

## 2017-04-09 NOTE — ASSESSMENT & PLAN NOTE
- Admit to Inpatient  - CT abdomen/pelvis revealed:  Soft tissue mass in the RLQ. Colonoscopy with biopsy completed.   - Gastroenterology following   - General Surgery consulted: 4/7/2017 for exploratory laparotomy: right gisele colectomy with terminal ileum and jejunal resection   - Analgesics/Antiemetic prn  -OOB TID

## 2017-04-09 NOTE — SUBJECTIVE & OBJECTIVE
Interval History: Pain is much better controlled.    Has been able to get up and use the restroom.    Is wondering about an oncology consultation and the timing of that    Medications:  Continuous Infusions:   dextrose 5 % and 0.9 % NaCl with KCl 20 mEq 75 mL/hr at 04/08/17 1511     Scheduled Meds:   acetaminophen  1,000 mg Intravenous Q8H    bisacodyl  10 mg Oral Once    gabapentin  100 mg Oral TID    heparin (porcine)  5,000 Units Subcutaneous Q8H    levothyroxine  75 mcg Oral Before breakfast    pantoprazole  40 mg Intravenous Daily    sertraline  100 mg Oral Daily    verapamil  120 mg Oral Nightly     PRN Meds:diphenhydrAMINE, hydrALAZINE, hydrocodone-acetaminophen 5-325mg, morphine, ondansetron, promethazine (PHENERGAN) IVPB     Review of patient's allergies indicates:   Allergen Reactions    Corticosteroids (glucocorticoids) Nausea Only and Other (See Comments)     Stomach pain, dizziness, headache    Oxycodone Other (See Comments)     Blood pressure dropped     Objective:     Vital Signs (Most Recent):  Temp: 98.6 °F (37 °C) (04/09/17 0846)  Pulse: 78 (04/09/17 0846)  Resp: 18 (04/09/17 0846)  BP: (!) 146/78 (04/09/17 0846)  SpO2: (!) 94 % (04/09/17 0846) Vital Signs (24h Range):  Temp:  [97.8 °F (36.6 °C)-98.7 °F (37.1 °C)] 98.6 °F (37 °C)  Pulse:  [78-94] 78  Resp:  [10-20] 18  SpO2:  [91 %-98 %] 94 %  BP: (143-163)/(66-85) 146/78     Weight: 82.6 kg (182 lb)  Body mass index is 31.24 kg/(m^2).    Intake/Output - Last 3 Shifts       04/07 0700 - 04/08 0659 04/08 0700 - 04/09 0659 04/09 0700 - 04/10 0659    P.O. 0 0     I.V. (mL/kg) 2753.3 (33.4) 1090 (13.2)     IV Piggyback 300 200     Total Intake(mL/kg) 3053.3 (37) 1290 (15.6)     Urine (mL/kg/hr) 1425 (0.7) 400 (0.2)     Stool 0 (0)      Blood 135 (0.1)      Total Output 1560 400      Net +1493.3 +890             Urine Occurrence 1 x 2 x     Stool Occurrence 0 x 0 x           Physical Exam   Constitutional: No distress.   Much more comfortable    HENT:   Head: Normocephalic and atraumatic.   Neck: Normal range of motion. Neck supple.   Cardiovascular: Normal rate, regular rhythm and normal heart sounds.  Exam reveals no friction rub.    No murmur heard.  Pulmonary/Chest: Effort normal and breath sounds normal.   Abdominal: Soft. Bowel sounds are normal. She exhibits no distension. There is no tenderness.   Incision is clean, no erythema or drainage       Significant Labs:  CBC:   Recent Labs  Lab 04/09/17  0615   WBC 9.05   RBC 3.18*   HGB 8.9*   HCT 28.5*      MCV 90   MCH 28.0   MCHC 31.2*     BMP:   Recent Labs  Lab 04/09/17  0615   *      K 4.6      CO2 27   BUN 13   CREATININE 1.0   CALCIUM 8.9   MG 1.8       Significant Diagnostics:  None

## 2017-04-10 LAB
BASOPHILS # BLD AUTO: 0.01 K/UL
BASOPHILS NFR BLD: 0.1 %
DIFFERENTIAL METHOD: ABNORMAL
EOSINOPHIL # BLD AUTO: 0.3 K/UL
EOSINOPHIL NFR BLD: 4.2 %
ERYTHROCYTE [DISTWIDTH] IN BLOOD BY AUTOMATED COUNT: 17 %
HCT VFR BLD AUTO: 25.9 %
HGB BLD-MCNC: 8.2 G/DL
LYMPHOCYTES # BLD AUTO: 2 K/UL
LYMPHOCYTES NFR BLD: 28.2 %
MCH RBC QN AUTO: 28.7 PG
MCHC RBC AUTO-ENTMCNC: 31.7 %
MCV RBC AUTO: 91 FL
MONOCYTES # BLD AUTO: 0.5 K/UL
MONOCYTES NFR BLD: 7.5 %
NEUTROPHILS # BLD AUTO: 4.3 K/UL
NEUTROPHILS NFR BLD: 60 %
PLATELET # BLD AUTO: 258 K/UL
PMV BLD AUTO: 9.8 FL
RBC # BLD AUTO: 2.86 M/UL
WBC # BLD AUTO: 7.17 K/UL

## 2017-04-10 PROCEDURE — 25000003 PHARM REV CODE 250: Performed by: SURGERY

## 2017-04-10 PROCEDURE — 63600175 PHARM REV CODE 636 W HCPCS: Performed by: SURGERY

## 2017-04-10 PROCEDURE — C9113 INJ PANTOPRAZOLE SODIUM, VIA: HCPCS | Performed by: EMERGENCY MEDICINE

## 2017-04-10 PROCEDURE — 25000003 PHARM REV CODE 250: Performed by: NURSE PRACTITIONER

## 2017-04-10 PROCEDURE — 21400001 HC TELEMETRY ROOM

## 2017-04-10 PROCEDURE — 94799 UNLISTED PULMONARY SVC/PX: CPT

## 2017-04-10 PROCEDURE — 36415 COLL VENOUS BLD VENIPUNCTURE: CPT

## 2017-04-10 PROCEDURE — 97530 THERAPEUTIC ACTIVITIES: CPT

## 2017-04-10 PROCEDURE — 97802 MEDICAL NUTRITION INDIV IN: CPT

## 2017-04-10 PROCEDURE — 85025 COMPLETE CBC W/AUTO DIFF WBC: CPT

## 2017-04-10 PROCEDURE — 63600175 PHARM REV CODE 636 W HCPCS: Performed by: EMERGENCY MEDICINE

## 2017-04-10 RX ADMIN — GABAPENTIN 100 MG: 100 CAPSULE ORAL at 06:04

## 2017-04-10 RX ADMIN — HYDROCODONE BITARTRATE AND ACETAMINOPHEN 1 TABLET: 5; 325 TABLET ORAL at 11:04

## 2017-04-10 RX ADMIN — HYDROCODONE BITARTRATE AND ACETAMINOPHEN 1 TABLET: 5; 325 TABLET ORAL at 09:04

## 2017-04-10 RX ADMIN — MORPHINE SULFATE 2 MG: 2 INJECTION, SOLUTION INTRAMUSCULAR; INTRAVENOUS at 07:04

## 2017-04-10 RX ADMIN — GABAPENTIN 100 MG: 100 CAPSULE ORAL at 01:04

## 2017-04-10 RX ADMIN — MORPHINE SULFATE 2 MG: 2 INJECTION, SOLUTION INTRAMUSCULAR; INTRAVENOUS at 06:04

## 2017-04-10 RX ADMIN — HEPARIN SODIUM 5000 UNITS: 5000 INJECTION, SOLUTION INTRAVENOUS; SUBCUTANEOUS at 01:04

## 2017-04-10 RX ADMIN — SERTRALINE HYDROCHLORIDE 100 MG: 50 TABLET ORAL at 09:04

## 2017-04-10 RX ADMIN — HEPARIN SODIUM 5000 UNITS: 5000 INJECTION, SOLUTION INTRAVENOUS; SUBCUTANEOUS at 06:04

## 2017-04-10 RX ADMIN — MORPHINE SULFATE 2 MG: 2 INJECTION, SOLUTION INTRAMUSCULAR; INTRAVENOUS at 04:04

## 2017-04-10 RX ADMIN — PANTOPRAZOLE SODIUM 40 MG: 40 INJECTION, POWDER, FOR SOLUTION INTRAVENOUS at 09:04

## 2017-04-10 RX ADMIN — HEPARIN SODIUM 5000 UNITS: 5000 INJECTION, SOLUTION INTRAVENOUS; SUBCUTANEOUS at 09:04

## 2017-04-10 RX ADMIN — METOPROLOL TARTRATE 25 MG: 25 TABLET ORAL at 09:04

## 2017-04-10 RX ADMIN — LEVOTHYROXINE SODIUM 75 MCG: 75 TABLET ORAL at 06:04

## 2017-04-10 RX ADMIN — POTASSIUM CHLORIDE, DEXTROSE MONOHYDRATE AND SODIUM CHLORIDE: 150; 5; 900 INJECTION, SOLUTION INTRAVENOUS at 09:04

## 2017-04-10 RX ADMIN — GABAPENTIN 100 MG: 100 CAPSULE ORAL at 09:04

## 2017-04-10 RX ADMIN — MORPHINE SULFATE 2 MG: 2 INJECTION, SOLUTION INTRAMUSCULAR; INTRAVENOUS at 09:04

## 2017-04-10 NOTE — PLAN OF CARE
Problem: Patient Care Overview  Goal: Plan of Care Review  Outcome: Ongoing (interventions implemented as appropriate)  Patient's pain has been controlled throughout the night with PRN pain meds. Patient has been resting quietly. NPO except medication and ice chips, normal sinus rhythm, midline incision open to air, clean dry and intact, encourage use of Incentive spirometer. Will continue to monitor.

## 2017-04-10 NOTE — SUBJECTIVE & OBJECTIVE
Interval History:  Some belching/nausea; no flatus/BM, pain control improved on current regimen    Medications:  Continuous Infusions:   dextrose 5 % and 0.9 % NaCl with KCl 20 mEq 75 mL/hr at 04/09/17 1807     Scheduled Meds:   gabapentin  100 mg Oral TID    heparin (porcine)  5,000 Units Subcutaneous Q8H    levothyroxine  75 mcg Oral Before breakfast    metoprolol tartrate  25 mg Oral BID    pantoprazole  40 mg Intravenous Daily    sertraline  100 mg Oral Daily     PRN Meds:diphenhydrAMINE, hydrALAZINE, hydrocodone-acetaminophen 5-325mg, morphine, ondansetron, promethazine (PHENERGAN) IVPB     Review of patient's allergies indicates:   Allergen Reactions    Corticosteroids (glucocorticoids) Nausea Only and Other (See Comments)     Stomach pain, dizziness, headache    Oxycodone Other (See Comments)     Blood pressure dropped     Objective:     Vital Signs (Most Recent):  Temp: 98.1 °F (36.7 °C) (04/10/17 0746)  Pulse: 69 (04/10/17 0800)  Resp: 18 (04/10/17 0800)  BP: (!) 157/88 (04/10/17 0746)  SpO2: 97 % (04/10/17 0800) Vital Signs (24h Range):  Temp:  [97.9 °F (36.6 °C)-98.4 °F (36.9 °C)] 98.1 °F (36.7 °C)  Pulse:  [65-80] 69  Resp:  [17-19] 18  SpO2:  [90 %-97 %] 97 %  BP: (135-157)/(70-88) 157/88     Weight: 86.2 kg (190 lb)  Body mass index is 32.61 kg/(m^2).    Intake/Output - Last 3 Shifts       04/08 0700 - 04/09 0659 04/09 0700 - 04/10 0659 04/10 0700 - 04/11 0659    P.O. 0 100     I.V. (mL/kg) 1090 (13.2) 1763.8 (20.5)     IV Piggyback 200 0     Total Intake(mL/kg) 1290 (15.6) 1863.8 (21.6)     Urine (mL/kg/hr) 400 (0.2) 1700 (0.8)     Other  0 (0)     Stool       Blood       Total Output 400 1700      Net +890 +163.8             Urine Occurrence 2 x      Stool Occurrence 0 x            Physical Exam   Constitutional: She appears well-developed and well-nourished. No distress.   Cardiovascular: Normal rate and regular rhythm.    Pulmonary/Chest: Effort normal.   Abdominal: Soft. She exhibits no  distension. Tenderness: attp.   Incision c,d,i   Vitals reviewed.      Significant Labs:  CBC:   Recent Labs  Lab 04/10/17  0523   WBC 7.17   RBC 2.86*   HGB 8.2*   HCT 25.9*      MCV 91   MCH 28.7   MCHC 31.7*     CMP:   Recent Labs  Lab 04/05/17  0807  04/09/17  0615   *  < > 132*   CALCIUM 10.0  < > 8.9   ALBUMIN 4.2  --   --    PROT 8.6*  --   --      < > 139   K 4.6  < > 4.6   CO2 24  < > 27   CL 98  < > 106   BUN 17  < > 13   CREATININE 1.1  < > 1.0   ALKPHOS 81  --   --    ALT 22  --   --    AST 37  --   --    BILITOT 0.4  --   --    < > = values in this interval not displayed.

## 2017-04-10 NOTE — PROGRESS NOTES
"[facility] Hospital Medicine  Progress Note    Patient Name: Violet Swenson  MRN: [id number]  Patient Class: IP- Inpatient   Admission Date: 4/5/2017  Length of Stay: 5 days  Attending Physician: Nikki Brewer MD  Primary Care Provider: Marti Coronel MD        Subjective:     Principal Problem:Lower abdominal pain    HPI:  Violet Swenson is a 77 year old female with a PMHx of Hypothyroidism, HTN, HLD, GERD, Depression, GERD, Depression, CAD, Gastric bypass '93, PPM, who presented to the ER with c/o generalized abdominal pain for 2 days. Pain describes as "cramping." Associated symptoms included: nausea, vomiting, and chest pain.  ED workup revealed:  WBC 13.25, glucose 119, lipase 351, amylase 189, troponin 0.179. CT abdomen/pelvis with soft tissue mass in the RUQ, recommend endoscopy with biopsy. CXR negative. ED discussed case with Cardiology.     Hospital Course:  Colonoscopy completed with biopsy of mass in the terminal ileum. General surgery was consulted. Awaiting pathology results. 4/7/17 right gisele-colectomy with terminal ileum and jejunal resection. PCA pump discontinued. Echo showed EF 40%. Discussed with Dr. Shah, General Surgery. Patient encouraged to get out of bed TID.   Positive bowel sounds , still no bowel movement       Interval History:     Review of Systems   Constitutional: Negative.  Negative for appetite change, chills, fatigue and fever.   HENT: Negative.  Negative for congestion, ear discharge, facial swelling, sore throat, trouble swallowing and voice change.    Eyes: Negative.  Negative for photophobia, pain, discharge, redness and visual disturbance.   Respiratory: Negative.  Negative for apnea, cough, choking, chest tightness, shortness of breath, wheezing and stridor.    Cardiovascular: Positive for chest pain. Negative for palpitations and leg swelling.   Gastrointestinal: Positive for abdominal pain and nausea. Negative for abdominal " distention, anal bleeding, blood in stool, constipation, diarrhea, rectal pain and vomiting.        Passing flatus   Endocrine: Negative.  Negative for cold intolerance, heat intolerance, polydipsia, polyphagia and polyuria.   Genitourinary: Negative.  Negative for difficulty urinating, dysuria, flank pain, frequency, hematuria, pelvic pain, urgency, vaginal bleeding and vaginal discharge.   Musculoskeletal: Negative.  Negative for arthralgias, back pain, gait problem, joint swelling, myalgias and neck pain.   Skin: Negative for color change, pallor, rash and wound.        Surgical dressing mid line abdomen   Allergic/Immunologic: Negative.  Negative for food allergies and immunocompromised state.   Neurological: Negative for dizziness, tremors, seizures, syncope, facial asymmetry, speech difficulty, weakness, light-headedness, numbness and headaches.   Hematological: Negative.  Negative for adenopathy. Does not bruise/bleed easily.   Psychiatric/Behavioral: Negative.  Negative for agitation, confusion, hallucinations, sleep disturbance and suicidal ideas. The patient is not nervous/anxious.    All other systems reviewed and are negative.    Objective:     Vital Signs (Most Recent):  Temp: 97.8 °F (36.6 °C) (04/10/17 1150)  Pulse: 72 (04/10/17 1150)  Resp: 18 (04/10/17 1150)  BP: (!) 158/73 (04/10/17 1150)  SpO2: 95 % (04/10/17 1150) Vital Signs (24h Range):  Temp:  [97.8 °F (36.6 °C)-98.4 °F (36.9 °C)] 97.8 °F (36.6 °C)  Pulse:  [65-80] 72  Resp:  [17-19] 18  SpO2:  [92 %-97 %] 95 %  BP: (135-158)/(70-88) 158/73     Weight: 86.2 kg (190 lb)  Body mass index is 32.61 kg/(m^2).    Intake/Output Summary (Last 24 hours) at 04/10/17 1422  Last data filed at 04/10/17 0600   Gross per 24 hour   Intake          1763.75 ml   Output             1700 ml   Net            63.75 ml      Physical Exam   Constitutional: She is oriented to person, place, and time. She appears well-developed and well-nourished. No distress.   Much  more comfortable   HENT:   Head: Normocephalic and atraumatic.   Nose: Nose normal.   Eyes: Conjunctivae and EOM are normal. Pupils are equal, round, and reactive to light. Right eye exhibits no discharge. Left eye exhibits no discharge.   Neck: Normal range of motion. Neck supple. No JVD present. No tracheal deviation present. No thyromegaly present.   Cardiovascular: Normal rate, regular rhythm and normal heart sounds.  Exam reveals no gallop and no friction rub.    No murmur heard.  Pulmonary/Chest: Effort normal and breath sounds normal. No stridor. No respiratory distress. She has no wheezes. She has no rales. She exhibits no tenderness.   Abdominal: Soft. Bowel sounds are normal. She exhibits no distension and no mass. There is no tenderness. There is no guarding.   Incision is clean, no erythema or drainage   Musculoskeletal: Normal range of motion. She exhibits no edema, tenderness or deformity.   Lymphadenopathy:     She has no cervical adenopathy.   Neurological: She is alert and oriented to person, place, and time. She has normal reflexes. No cranial nerve deficit. Coordination normal.   Skin: Skin is warm and dry. No rash noted. She is not diaphoretic. No erythema. No pallor.   Psychiatric: She has a normal mood and affect. Her behavior is normal. Judgment and thought content normal.   Nursing note and vitals reviewed.      Significant Labs:   BMP:   Recent Labs  Lab 04/09/17  0615   *      K 4.6      CO2 27   BUN 13   CREATININE 1.0   CALCIUM 8.9   MG 1.8     CBC:   Recent Labs  Lab 04/09/17  0615 04/10/17  0523   WBC 9.05 7.17   HGB 8.9* 8.2*   HCT 28.5* 25.9*    258       Significant Imaging: I have reviewed all pertinent imaging results/findings within the past 24 hours.    Assessment/Plan:      Hypothyroidism (acquired)  -TSH nml  - Levothyroxine      CAD, multiple vessel  - Continue ASA and Statin   -last heart Cath 2015 with patent stents    Chest pain  -  resolved    Abnormal CT scan, gastrointestinal tract        Malignant neoplasm of ileocecal valve  -pathology pending      Ischemic cardiomyopathy  Stable , continue CHF core measures    VTE Risk Mitigation         Ordered     heparin (porcine) injection 5,000 Units  Every 8 hours     Route:  Subcutaneous        04/07/17 1554     Medium Risk of VTE  Once      04/07/17 1554     Place sequential compression device  Until discontinued      04/07/17 1447          Nikki Brewer MD  Department of Hospital Medicine   [facility]

## 2017-04-10 NOTE — PLAN OF CARE
Problem: Patient Care Overview  Goal: Plan of Care Review  Outcome: Ongoing (interventions implemented as appropriate)  Recommendation/Intervention: 1. Advance diet as tolerated to Light/GI Soft 2. If unable to advance diet within 72 hours consider IV nutrition support Clinimix E 4.25/10 at 90ml/hr with 250ml 20% lipids daily (1602 calories, 92g protein, 1.7mg/kg/min GIR)  Goals: Oral diet or nutrition support to meet 80% of needs wihtin 72 hours.  Nutrition Goal Status: new

## 2017-04-10 NOTE — ASSESSMENT & PLAN NOTE
Partially obstructing mass of terminal ileum  Patient underwent a right hemicolectomy and jejunal resection for a presumed malignancy    Pain control  Awaiting return of bowel function to advance diet  Ice chips sparingly

## 2017-04-10 NOTE — PLAN OF CARE
Reviewed PT/OT Progress Notes reflecting next recommended level of care for patient as HH and HME needs as bedside commode and bath bench.  HH order in [facility].      Met with patient and patient's daughter, Leah Bedolla, in hospital room about recommended discharge needs for patient.  Patient indicates being receptive to HH SN, PT, OT services and indicates HH agency preference as [facility] as preference for bedside commode.  (Patient states that she takes a shower and has a seat in her shower; therefore, does not need a bath bench).  [facility] Preference Form signed reflecting HH preference as [facility] and [facility] as HME preference; and signed form placed in patient's blue folder.  Patient indicates that she is awaiting biopsy results and states that she may have additional discharge needs depending on biopsy results.  Informed patient that this  will continue to follow her throughout her stay and make needed adjustments to ensure that her discharge needs are met.    Contacted Reba with [facility] and made referral, informing Reba of above.  Reba indicates that they will be able to accept patient for HH services should this remain the plan.  Faxed HH order and other needed patient information to [facility] via [facility] to complete HH referral.  Also, requested order for bedside commode and will arrange once determined that D/C Plan for patient remains being for patient to be discharged home with HH services.       04/10/17 1426   Discharge Reassessment   Assessment Type Discharge Planning Reassessment   Can the patient answer the patient profile reliably? Yes, cognitively intact   How does the patient rate their overall health at the present time? Fair   Describe the patient's ability to walk at the present time. No restrictions   How often would a person be available to care for the patient? Whenever needed   Number of comorbid conditions (as recorded on  the chart) Five or more   During the past month, has the patient often been bothered by feeling down, depressed or hopeless? No   During the past month, has the patient often been bothered by little interest or pleasure in doing things? No   Discharge plan remains the same: No   Provided patient/caregiver education on the expected discharge date and the discharge plan Yes   Discharge Plan A Home with family;Home Health   Discharge Plan B Home with family;Other   Change in patient condition or support system No   Patient choice form signed by patient/caregiver Yes   Explained to the the patient/caregiver why the discharge planned changed: Yes   Involved the patient/caregiver in establishing a new discharge plan: Yes

## 2017-04-10 NOTE — PROGRESS NOTES
[facility] -   General Surgery  Progress Note    Subjective:     History of Present Illness:  78 y/o female referred for partially obstructing mass of the terminal ileum/cecum. She presented with a few day history of worsening right lower quadrant abdominal pain, nausea/emesis. She underwent CT scan concerning for ileocecal mass and subsequent colonoscopy showed partially obstructing mass of the terminal ileum.  She has 30lb weight loss in recent months, denies any fever/chills, diarrhea, constipation. She currently reports crampy abdominal pain in the RLQ. Previously had gastric bypass, cholecystectomy, appendectomy.    Post-Op Info:  Procedure(s) (LRB):  EXPLORATORY-LAPAROTOMY (N/A)  COLECTOMY-PARTIAL (N/A)  LYSIS-ADHESION (N/A)   3 Days Post-Op     Interval History:  Some belching/nausea; no flatus/BM, pain control improved on current regimen    Medications:  Continuous Infusions:   dextrose 5 % and 0.9 % NaCl with KCl 20 mEq 75 mL/hr at 04/09/17 1807     Scheduled Meds:   gabapentin  100 mg Oral TID    heparin (porcine)  5,000 Units Subcutaneous Q8H    levothyroxine  75 mcg Oral Before breakfast    metoprolol tartrate  25 mg Oral BID    pantoprazole  40 mg Intravenous Daily    sertraline  100 mg Oral Daily     PRN Meds:diphenhydrAMINE, hydrALAZINE, hydrocodone-acetaminophen 5-325mg, morphine, ondansetron, promethazine (PHENERGAN) IVPB     Review of patient's allergies indicates:   Allergen Reactions    Corticosteroids (glucocorticoids) Nausea Only and Other (See Comments)     Stomach pain, dizziness, headache    Oxycodone Other (See Comments)     Blood pressure dropped     Objective:     Vital Signs (Most Recent):  Temp: 98.1 °F (36.7 °C) (04/10/17 0746)  Pulse: 69 (04/10/17 0800)  Resp: 18 (04/10/17 0800)  BP: (!) 157/88 (04/10/17 0746)  SpO2: 97 % (04/10/17 0800) Vital Signs (24h Range):  Temp:  [97.9 °F (36.6 °C)-98.4 °F (36.9 °C)] 98.1 °F (36.7 °C)  Pulse:  [65-80] 69  Resp:  [17-19]  18  SpO2:  [90 %-97 %] 97 %  BP: (135-157)/(70-88) 157/88     Weight: 86.2 kg (190 lb)  Body mass index is 32.61 kg/(m^2).    Intake/Output - Last 3 Shifts       04/08 0700 - 04/09 0659 04/09 0700 - 04/10 0659 04/10 0700 - 04/11 0659    P.O. 0 100     I.V. (mL/kg) 1090 (13.2) 1763.8 (20.5)     IV Piggyback 200 0     Total Intake(mL/kg) 1290 (15.6) 1863.8 (21.6)     Urine (mL/kg/hr) 400 (0.2) 1700 (0.8)     Other  0 (0)     Stool       Blood       Total Output 400 1700      Net +890 +163.8             Urine Occurrence 2 x      Stool Occurrence 0 x            Physical Exam   Constitutional: She appears well-developed and well-nourished. No distress.   Cardiovascular: Normal rate and regular rhythm.    Pulmonary/Chest: Effort normal.   Abdominal: Soft. She exhibits no distension. Tenderness: attp.   Incision c,d,i   Vitals reviewed.      Significant Labs:  CBC:   Recent Labs  Lab 04/10/17  0523   WBC 7.17   RBC 2.86*   HGB 8.2*   HCT 25.9*      MCV 91   MCH 28.7   MCHC 31.7*     CMP:   Recent Labs  Lab 04/05/17  0807  04/09/17  0615   *  < > 132*   CALCIUM 10.0  < > 8.9   ALBUMIN 4.2  --   --    PROT 8.6*  --   --      < > 139   K 4.6  < > 4.6   CO2 24  < > 27   CL 98  < > 106   BUN 17  < > 13   CREATININE 1.1  < > 1.0   ALKPHOS 81  --   --    ALT 22  --   --    AST 37  --   --    BILITOT 0.4  --   --    < > = values in this interval not displayed.        Assessment/Plan:     * Lower abdominal pain  Partially obstructing mass of terminal ileum  Patient underwent a right hemicolectomy and jejunal resection for a presumed malignancy    Pain control  Awaiting return of bowel function to advance diet  Ice chips sparingly    Essential hypertension  Home medications for blood pressure control    Hyperlipidemia  Home medications once a diet as tolerated    Hypothyroidism (acquired)  Continue Synthroid  orally    CAD, multiple vessel  Monitor for signs of chest pain    Malignant neoplasm of ileocecal  valve  Post op day 3  Pain is  controlled  Physical therapy    Dulcolax  Oral and IV narcotics    Will discuss with oncology once path results return      Orlando Moulton MD  General Surgery  [facility]

## 2017-04-10 NOTE — CONSULTS
[facility] -   Adult Nutrition  Consult Note    SUMMARY     Recommendations  Recommendation/Intervention: 1. Advance diet as tolerated to Light/GI Soft    2. If unable to advance diet within 72 hours consider IV nutrition support Clinimix E 4.25/10 at 90ml/hr with 250ml 20% lipids daily (1602 calories, 92g protein, 1.7mg/kg/min GIR)  Goals: Oral diet or nutrition support to meet 80% of needs wihtin 72 hours.  Nutrition Goal Status: new    Nutrition Discharge Plan  Home on Light/GI soft Cardiac diet    Reason for Assessment  Reason for Assessment: identified at risk by screening criteria, NPO/clear liquids x 5 days  Diagnosis:  (lower abdominal pain, malignant neoplasm of ileocecal valve)  Relevent Medical History: CAD, GERD, Gout, HLD, HTN, Gastric Bypass, Depression, see H&P   General Information Comments: POD 3 for Right Hemicolectomy with jejunal resection. Patient experiencing N/V since surgery. Currently with BS active in all four quandrants, no flatus or BM yet.    Nutrition Prescription Ordered  Current Diet Order: NPO     Nutrition Risk Screen  Nutrition Risk Screen: no indicators present    Nutrition/Diet History  Typical Food/Fluid Intake: Patient with varying intake for many years following her gastric bypass. She reports having isolated days with N/V that affects intake. She maintaing 225lb for several years and about 2 years ago she reports weight loss down to 180-190 which she has maintainined over the last 1 year.    Labs/Tests/Procedures/Meds  Pertinent Labs Reviewed: reviewed  Pertinent Labs Comments: Gluc 132, Phos 2.6  Pertinent Medications Reviewed: reviewed    Physical Findings  Overall Physical Appearance: obese  Oral/Mouth Cavity: tooth/teeth missing  Skin:  (incision, Andrez 18)    Anthropometrics  Height (inches): 64.02 in  Weight Method: Stated  Weight (kg): 86.2 kg  Ideal Body Weight (IBW), Female: 120.1 lb  % Ideal Body Weight, Female (lb): 158.23 lb  BMI (kg/m2): 32.6  BMI  Grade: 30 - 34.9- obesity - grade I    Estimated/Assessed Needs  Weight Used For Calorie Calculations: 86.2 kg (190 lb 0.6 oz)   Height (cm): 162.6 cm  Energy Need Method: [place]-St Jeor (x1.2 = 1610 calories)  Weight Used For Protein Calculations: 86.2 kg (190 lb 0.6 oz)  1.0 gm Protein (gm): 86.38  Fluid Need Method: RDA Method (1ml/karen or as needed)    Monitor and Evaluation  Food and Nutrient Intake: energy intake  Food and Nutrient Adminstration: diet order, enteral and parenteral nutrition administration  Anthropometric Measurements: weight  Biochemical Data, Medical Tests and Procedures: electrolyte and renal panel, glucose/endocrine profile  Nutrition-Focused Physical Findings: skin    Nutrition Follow-Up  RD Follow-up?: Yes (2x weekly)    Assessment and Plan  Malignant neoplasm of ileocecal valve  Nutrition Problem:   Inadequate energy intake    Etiology/Related to:   Altered GI tract function    As evidenced by:  Malignant neoplasm, s/p R hemicolectomy, NPO greater than 5 days    Treatment Strategy:   1. Advance to oral diet  2. If unable to advance within 72 hours, consider IV nutrition support    Nutrition Diagnosis Status:   New

## 2017-04-10 NOTE — ASSESSMENT & PLAN NOTE
Post op day 3  Pain is  controlled  Physical therapy    Dulcolax  Oral and IV narcotics    Will discuss with oncology once path results return

## 2017-04-10 NOTE — PLAN OF CARE
Problem: Patient Care Overview  Goal: Plan of Care Review  Outcome: Ongoing (interventions implemented as appropriate)  Patient has been doing well today with her IS use however has refused to walk today. She sat up on the couch this morning but has not ambulated unless she is walking to the bathroom. Patient has been encouraged and educated regarding the importance of ambulating. Patient has good bowel sounds but no bowel movement as of yet. Incision remains clean and staples intact. Patient has fluctuating complaints of pain and has been sleeping between care today. PRN medication given as ordered and times updated on white board.

## 2017-04-10 NOTE — ASSESSMENT & PLAN NOTE
Nutrition Problem:   Inadequate energy intake    Etiology/Related to:   Altered GI tract function    As evidenced by:  Malignant neoplasm, s/p R hemicolectomy, NPO greater than 5 days    Treatment Strategy:   1. Advance to oral diet  2. If unable to advance within 72 hours, consider IV nutrition support    Nutrition Diagnosis Status:   New

## 2017-04-10 NOTE — SUBJECTIVE & OBJECTIVE
Interval History:     Review of Systems   Constitutional: Negative.  Negative for appetite change, chills, fatigue and fever.   HENT: Negative.  Negative for congestion, ear discharge, facial swelling, sore throat, trouble swallowing and voice change.    Eyes: Negative.  Negative for photophobia, pain, discharge, redness and visual disturbance.   Respiratory: Negative.  Negative for apnea, cough, choking, chest tightness, shortness of breath, wheezing and stridor.    Cardiovascular: Positive for chest pain. Negative for palpitations and leg swelling.   Gastrointestinal: Positive for abdominal pain and nausea. Negative for abdominal distention, anal bleeding, blood in stool, constipation, diarrhea, rectal pain and vomiting.        Passing flatus   Endocrine: Negative.  Negative for cold intolerance, heat intolerance, polydipsia, polyphagia and polyuria.   Genitourinary: Negative.  Negative for difficulty urinating, dysuria, flank pain, frequency, hematuria, pelvic pain, urgency, vaginal bleeding and vaginal discharge.   Musculoskeletal: Negative.  Negative for arthralgias, back pain, gait problem, joint swelling, myalgias and neck pain.   Skin: Negative for color change, pallor, rash and wound.        Surgical dressing mid line abdomen   Allergic/Immunologic: Negative.  Negative for food allergies and immunocompromised state.   Neurological: Negative for dizziness, tremors, seizures, syncope, facial asymmetry, speech difficulty, weakness, light-headedness, numbness and headaches.   Hematological: Negative.  Negative for adenopathy. Does not bruise/bleed easily.   Psychiatric/Behavioral: Negative.  Negative for agitation, confusion, hallucinations, sleep disturbance and suicidal ideas. The patient is not nervous/anxious.    All other systems reviewed and are negative.    Objective:     Vital Signs (Most Recent):  Temp: 97.8 °F (36.6 °C) (04/10/17 1150)  Pulse: 72 (04/10/17 1150)  Resp: 18 (04/10/17 1150)  BP: (!)  158/73 (04/10/17 1150)  SpO2: 95 % (04/10/17 1150) Vital Signs (24h Range):  Temp:  [97.8 °F (36.6 °C)-98.4 °F (36.9 °C)] 97.8 °F (36.6 °C)  Pulse:  [65-80] 72  Resp:  [17-19] 18  SpO2:  [92 %-97 %] 95 %  BP: (135-158)/(70-88) 158/73     Weight: 86.2 kg (190 lb)  Body mass index is 32.61 kg/(m^2).    Intake/Output Summary (Last 24 hours) at 04/10/17 1422  Last data filed at 04/10/17 0600   Gross per 24 hour   Intake          1763.75 ml   Output             1700 ml   Net            63.75 ml      Physical Exam   Constitutional: She is oriented to person, place, and time. She appears well-developed and well-nourished. No distress.   Much more comfortable   HENT:   Head: Normocephalic and atraumatic.   Nose: Nose normal.   Eyes: Conjunctivae and EOM are normal. Pupils are equal, round, and reactive to light. Right eye exhibits no discharge. Left eye exhibits no discharge.   Neck: Normal range of motion. Neck supple. No JVD present. No tracheal deviation present. No thyromegaly present.   Cardiovascular: Normal rate, regular rhythm and normal heart sounds.  Exam reveals no gallop and no friction rub.    No murmur heard.  Pulmonary/Chest: Effort normal and breath sounds normal. No stridor. No respiratory distress. She has no wheezes. She has no rales. She exhibits no tenderness.   Abdominal: Soft. Bowel sounds are normal. She exhibits no distension and no mass. There is no tenderness. There is no guarding.   Incision is clean, no erythema or drainage   Musculoskeletal: Normal range of motion. She exhibits no edema, tenderness or deformity.   Lymphadenopathy:     She has no cervical adenopathy.   Neurological: She is alert and oriented to person, place, and time. She has normal reflexes. No cranial nerve deficit. Coordination normal.   Skin: Skin is warm and dry. No rash noted. She is not diaphoretic. No erythema. No pallor.   Psychiatric: She has a normal mood and affect. Her behavior is normal. Judgment and thought  content normal.   Nursing note and vitals reviewed.      Significant Labs:   BMP:   Recent Labs  Lab 04/09/17  0615   *      K 4.6      CO2 27   BUN 13   CREATININE 1.0   CALCIUM 8.9   MG 1.8     CBC:   Recent Labs  Lab 04/09/17  0615 04/10/17  0523   WBC 9.05 7.17   HGB 8.9* 8.2*   HCT 28.5* 25.9*    258       Significant Imaging: I have reviewed all pertinent imaging results/findings within the past 24 hours.

## 2017-04-10 NOTE — PT/OT/SLP PROGRESS
Physical Therapy      Violet Swenson  MRN: [id number]    PT DECLINED OUT OF BED AND AMBULATION.  CONSEQUENCES OF IMMOBILITY AND NO OUT OF BED EXPLAINED TO PATIENT AND DAUGHTER.  EXTENSIVE PT EDUCATION.  DEMONSTRATED ASSISTED COUGHING TO PATIENT AND HER DAUGHTER, WITH RETURN DEMO BY PT, WITH BENDING KNEES TO COUGH.    NURSE AMADA IN ROOM AND AWARE OF PT DISPOSITION.  3 ATTEMPTS MADE TO SEE PT TODAY, ( IN ROOM, INFILTRATED IV AND THEN PT DECLINED TO WALK)  MUCH ENCOURAGEMENT GIVEN, PT FEARFUL OF BEING TOO WEAK TO WALK, AND PT AND DAUGHTER WERE REASSURED THAT GAIT WOULD BENEFIT HER VS HARM HER.     Denisa Gomez, PT                            [phone]                            04/10/17

## 2017-04-11 PROCEDURE — 25000003 PHARM REV CODE 250: Performed by: SURGERY

## 2017-04-11 PROCEDURE — 97530 THERAPEUTIC ACTIVITIES: CPT

## 2017-04-11 PROCEDURE — 25000003 PHARM REV CODE 250: Performed by: NURSE PRACTITIONER

## 2017-04-11 PROCEDURE — 94761 N-INVAS EAR/PLS OXIMETRY MLT: CPT

## 2017-04-11 PROCEDURE — 25000003 PHARM REV CODE 250: Performed by: INTERNAL MEDICINE

## 2017-04-11 PROCEDURE — 21400001 HC TELEMETRY ROOM

## 2017-04-11 RX ORDER — VERAPAMIL HYDROCHLORIDE 120 MG/1
120 TABLET, FILM COATED, EXTENDED RELEASE ORAL NIGHTLY
Status: DISCONTINUED | OUTPATIENT
Start: 2017-04-11 | End: 2017-04-14 | Stop reason: HOSPADM

## 2017-04-11 RX ORDER — CLONIDINE HYDROCHLORIDE 0.1 MG/1
0.1 TABLET ORAL EVERY 6 HOURS PRN
Status: DISCONTINUED | OUTPATIENT
Start: 2017-04-11 | End: 2017-04-14 | Stop reason: HOSPADM

## 2017-04-11 RX ORDER — CLOPIDOGREL BISULFATE 75 MG/1
75 TABLET ORAL DAILY
Status: DISCONTINUED | OUTPATIENT
Start: 2017-04-11 | End: 2017-04-14 | Stop reason: HOSPADM

## 2017-04-11 RX ORDER — PANTOPRAZOLE SODIUM 40 MG/1
40 TABLET, DELAYED RELEASE ORAL DAILY
Status: DISCONTINUED | OUTPATIENT
Start: 2017-04-11 | End: 2017-04-14 | Stop reason: HOSPADM

## 2017-04-11 RX ORDER — ROSUVASTATIN CALCIUM 10 MG/1
10 TABLET, COATED ORAL DAILY
Status: DISCONTINUED | OUTPATIENT
Start: 2017-04-11 | End: 2017-04-14 | Stop reason: HOSPADM

## 2017-04-11 RX ADMIN — METOPROLOL TARTRATE 25 MG: 25 TABLET ORAL at 08:04

## 2017-04-11 RX ADMIN — METOPROLOL TARTRATE 25 MG: 25 TABLET ORAL at 09:04

## 2017-04-11 RX ADMIN — GABAPENTIN 100 MG: 100 CAPSULE ORAL at 05:04

## 2017-04-11 RX ADMIN — HYDROCODONE BITARTRATE AND ACETAMINOPHEN 1 TABLET: 5; 325 TABLET ORAL at 02:04

## 2017-04-11 RX ADMIN — PANTOPRAZOLE SODIUM 40 MG: 40 TABLET, DELAYED RELEASE ORAL at 02:04

## 2017-04-11 RX ADMIN — VERAPAMIL HYDROCHLORIDE 120 MG: 120 TABLET, FILM COATED, EXTENDED RELEASE ORAL at 09:04

## 2017-04-11 RX ADMIN — LEVOTHYROXINE SODIUM 75 MCG: 75 TABLET ORAL at 05:04

## 2017-04-11 RX ADMIN — CLOPIDOGREL BISULFATE 75 MG: 75 TABLET ORAL at 02:04

## 2017-04-11 RX ADMIN — HEPARIN SODIUM 5000 UNITS: 5000 INJECTION, SOLUTION INTRAVENOUS; SUBCUTANEOUS at 02:04

## 2017-04-11 RX ADMIN — SERTRALINE HYDROCHLORIDE 100 MG: 50 TABLET ORAL at 08:04

## 2017-04-11 RX ADMIN — HYDROCODONE BITARTRATE AND ACETAMINOPHEN 1 TABLET: 5; 325 TABLET ORAL at 09:04

## 2017-04-11 RX ADMIN — ROSUVASTATIN CALCIUM 10 MG: 10 TABLET, FILM COATED ORAL at 02:04

## 2017-04-11 RX ADMIN — GABAPENTIN 100 MG: 100 CAPSULE ORAL at 09:04

## 2017-04-11 RX ADMIN — HEPARIN SODIUM 5000 UNITS: 5000 INJECTION, SOLUTION INTRAVENOUS; SUBCUTANEOUS at 09:04

## 2017-04-11 RX ADMIN — CLONIDINE HYDROCHLORIDE 0.1 MG: 0.1 TABLET ORAL at 09:04

## 2017-04-11 RX ADMIN — HEPARIN SODIUM 5000 UNITS: 5000 INJECTION, SOLUTION INTRAVENOUS; SUBCUTANEOUS at 05:04

## 2017-04-11 RX ADMIN — GABAPENTIN 100 MG: 100 CAPSULE ORAL at 02:04

## 2017-04-11 NOTE — ASSESSMENT & PLAN NOTE
Partially obstructing mass of terminal ileum  Patient underwent a right hemicolectomy and jejunal resection for a presumed malignancy    Pain control  advance diet  Clear liquids

## 2017-04-11 NOTE — SUBJECTIVE & OBJECTIVE
Interval History:  -nausea/emesis; +flatus/+bm, pain controlled    Medications:  Continuous Infusions:     Scheduled Meds:   gabapentin  100 mg Oral TID    heparin (porcine)  5,000 Units Subcutaneous Q8H    levothyroxine  75 mcg Oral Before breakfast    metoprolol tartrate  25 mg Oral BID    pantoprazole  40 mg Oral Daily    sertraline  100 mg Oral Daily     PRN Meds:cloNIDine, diphenhydrAMINE, hydrALAZINE, hydrocodone-acetaminophen 5-325mg, morphine, ondansetron, promethazine (PHENERGAN) IVPB     Review of patient's allergies indicates:   Allergen Reactions    Corticosteroids (glucocorticoids) Nausea Only and Other (See Comments)     Stomach pain, dizziness, headache    Oxycodone Other (See Comments)     Blood pressure dropped     Objective:     Vital Signs (Most Recent):  Temp: 98.3 °F (36.8 °C) (04/11/17 0844)  Pulse: 68 (04/11/17 0844)  Resp: 18 (04/11/17 0844)  BP: (!) 203/93 (04/11/17 0844)  SpO2: 97 % (04/11/17 0857) Vital Signs (24h Range):  Temp:  [96.5 °F (35.8 °C)-98.4 °F (36.9 °C)] 98.3 °F (36.8 °C)  Pulse:  [64-72] 68  Resp:  [18] 18  SpO2:  [92 %-97 %] 97 %  BP: (145-203)/(73-96) 203/93     Weight: 86.2 kg (190 lb)  Body mass index is 32.61 kg/(m^2).    Intake/Output - Last 3 Shifts       04/09 0700 - 04/10 0659 04/10 0700 - 04/11 0659 04/11 0700 - 04/12 0659    P.O. 100 250     I.V. (mL/kg) 1763.8 (20.5) 823.8 (9.6)     IV Piggyback 0      Total Intake(mL/kg) 1863.8 (21.6) 1073.8 (12.5)     Urine (mL/kg/hr) 1700 (0.8) 2225 (1.1)     Other 0 (0)      Stool  0 (0)     Total Output 1700 2225      Net +163.8 -1151.3             Stool Occurrence  0 x           Physical Exam   Constitutional: She appears well-developed and well-nourished. No distress.   Cardiovascular: Normal rate and regular rhythm.    Pulmonary/Chest: Effort normal.   Abdominal: Soft. She exhibits no distension. Tenderness: attp.   Incision c,d,i   Vitals reviewed.      Significant Labs:  CBC:     Recent Labs  Lab 04/10/17  0523    WBC 7.17   RBC 2.86*   HGB 8.2*   HCT 25.9*      MCV 91   MCH 28.7   MCHC 31.7*     CMP:   Recent Labs  Lab 04/05/17  0807  04/09/17  0615   *  < > 132*   CALCIUM 10.0  < > 8.9   ALBUMIN 4.2  --   --    PROT 8.6*  --   --      < > 139   K 4.6  < > 4.6   CO2 24  < > 27   CL 98  < > 106   BUN 17  < > 13   CREATININE 1.1  < > 1.0   ALKPHOS 81  --   --    ALT 22  --   --    AST 37  --   --    BILITOT 0.4  --   --    < > = values in this interval not displayed.

## 2017-04-11 NOTE — PLAN OF CARE
Problem: Patient Care Overview  Goal: Plan of Care Review  Outcome: Ongoing (interventions implemented as appropriate)  PT REQUIRES SBA WITH USE OF BED RAIL FOR GT X 10' WITH NO AD

## 2017-04-11 NOTE — ASSESSMENT & PLAN NOTE
Post op day 4  Clears advance diet as tolerated  Pain is  controlled  Physical therapy    Dulcolax  Oral and IV narcotics    Will discuss with oncology once path results return

## 2017-04-11 NOTE — NURSING
Notified Dr. Houston via [facility] of patient refusing to have another IV started tonight. Will wait for further instructions.

## 2017-04-11 NOTE — H&P
"[facility] Hospital Medicine  History & Physical    Patient Name: Violet Swenson  MRN: [id number]  Admission Date: 4/5/2017  Attending Physician:Nikki Brewer MD  Primary Care Provider: Marti Coronel MD         Patient information was obtained from Patient     Subjective:     Principal Problem:Lower abdominal pain    Chief Complaint:   Chief Complaint   Patient presents with    Abdominal Pain     terrible stomach cramps beginning monday with nausea and vomiting         HPI: Violet Swenson is a 77 year old female with a PMHx of Hypothyroidism, HTN, HLD, GERD, Depression, GERD, Depression, CAD, Gastric bypass '93, PPM, who presented to the ER with c/o generalized abdominal pain for 2 days. Pain describes as "cramping." Associated symptoms included: nausea, vomiting, and chest pain.  ED workup revealed:  WBC 13.25, glucose 119, lipase 351, amylase 189, troponin 0.179. CT abdomen/pelvis with soft tissue mass in the RUQ, recommend endoscopy with biopsy. CXR negative. ED discussed case with Cardiology.     No new subjective & objective note has been filed under this hospital service since the last note was generated.    Assessment/Plan:     Essential hypertension  - uncontrolled ,  - Continue to adjust meds      Hyperlipidemia  - Continue Statin      Hypothyroidism (acquired)  -TSH nml  - Levothyroxine      CAD, multiple vessel  - Continue ASA and Statin / bb  -last heart Cath 2015 with patent stents    Malignant neoplasm of ileocecal valve  Partial obstructing mass at terminal ileus s/p right hemicolectomy and jejunal resection   - clear liquid diet   -pathology pending      Ischemic cardiomyopathy  Stable , continue CHF core measures    VTE Risk Mitigation         Ordered     heparin (porcine) injection 5,000 Units  Every 8 hours     Route:  Subcutaneous        04/07/17 1554     Medium Risk of VTE  Once      04/07/17 1554     Place sequential compression device  Until " discontinued      04/07/17 1447        Nikki Brewer MD  Department of Hospital Medicine   [facility]

## 2017-04-11 NOTE — PT/OT/SLP PROGRESS
Physical Therapy  Treatment    Violet Swenson   MRN: [id number]   Admitting Diagnosis: Lower abdominal pain    PT Received On: 17  PT Start Time: 0949     PT Stop Time:     PT Total Time (min): 23 min       Billable Minutes:  Therapeutic Activity 23    Treatment Type: Treatment  PT/PTA: PT             General Precautions: Standard,    Orthopedic Precautions: N/A   Braces:           Subjective:  Communicated with NURSE BELL AND EPIC CHART REVIEW  prior to session.  PT AGREED TO TX WITH ENCOURAGEMENT.     Pain Ratin/10        Location: abdomen  Pain Addressed: Reposition  Pain Rating Post-Intervention: 8/10    Objective:   Patient found with: telemetry    Functional Mobility:  Bed Mobility:   Rolling/Turning Right: Stand by assistance  Supine to Sit: Stand by Assistance    Transfers:  Sit <> Stand Assistance: Stand By Assistance  Sit <> Stand Assistive Device: No Assistive Device  Bed <> Chair Technique: Stand Pivot  Bed <> Chair Assistance: Stand By Assistance  Bed <> Chair Assistive Device: No Assistive Device    Gait:   Gait Distance: PT GT TRAINED X 10' WITH HHA OF BED   Assistance 1: Stand by Assistance  Gait Assistive Device: No device  Gait Pattern: swing-to gait  Gait Deviation(s): decreased joseph    Therapeutic Activities and Exercises:  PT GT TRAINED AND T/F TO CHAIR WITH SBA. PT COMPLETED SEATED TE X 10 REPS OF MIP , TKE AND AP. PT SEATED WITH /104. PT LEFT WITH MD PRESENT AND ALL NEEDS MET.      AM-PAC 6 CLICK MOBILITY  How much help from another person does this patient currently need?   1 = Unable, Total/Dependent Assistance  2 = A lot, Maximum/Moderate Assistance  3 = A little, Minimum/Contact Guard/Supervision  4 = None, Modified [place]/Independent         AM-PAC Raw Score CMS G-Code Modifier Level of Impairment Assistance   6 % Total / Unable   7 - 9 CM 80 - 100% Maximal Assist   10 - 14 CL 60 - 80% Moderate Assist   15 - 19 CK 40 - 60% Moderate Assist    20 - 22 CJ 20 - 40% Minimal Assist   23 CI 1-20% SBA / CGA   24 CH 0% Independent/ Mod I     Patient left up in chair with call button in reach.    Assessment:  PT SARAH TX WITH ELEVATED BP. PT NURSE ARABELLA AVILA.     Rehab identified problem list/impairments: Rehab identified problem list/impairments: weakness, impaired endurance, gait instability, pain, decreased ROM, impaired balance, impaired functional mobilty    Rehab potential is good.    Activity tolerance: Fair    Discharge recommendations: Discharge Facility/Level Of Care Needs: home health PT     Barriers to discharge: Barriers to Discharge: None    Equipment recommendations:       GOALS:   Physical Therapy Goals        Problem: Physical Therapy Goal    Goal Priority Disciplines Outcome Goal Variances Interventions   Physical Therapy Goal     PT/OT, PT      Description:  PT eval complete.  Pt will be able to perform the following in 7 days  1.  Pt will be IND with bed mobility  2.  Pt will transfer bed to chair IND  3.  Pt will ambulate 150 feet without AD with SPV/IND              PLAN:    Patient to be seen  (Will be seen a min of 5 out of 7)  to address the above listed problems via gait training, therapeutic activities, therapeutic exercises  Plan of Care expires:    Plan of Care reviewed with: patient         Annelise Bedolla, PT  04/11/2017

## 2017-04-11 NOTE — PLAN OF CARE
Problem: Patient Care Overview  Goal: Plan of Care Review  Outcome: Ongoing (interventions implemented as appropriate)  Patient remains free from falls and injury. Vitals stable. Pain controlled with PRN medication. Will continue to monitor.

## 2017-04-11 NOTE — NURSING
Patient had IV in right AC. D5 NS 20mEq K at 75ml/hour infusing. Pump continued to alarm after many interventions. Pt requested to have IV taken out for tonight and refuses to be stuck again. Has had 7 IV's since admit. Notified CARLITO Britton. Taught about risks of dehydration/constipation since patient is NPO. Patient still refuses to be stuck tonight. Said her arms are too sore from all of the IV's. Will continue to monitor.

## 2017-04-11 NOTE — ASSESSMENT & PLAN NOTE
Partial obstructing mass at terminal ileus s/p right hemicolectomy and jejunal resection   - clear liquid diet   -pathology pending

## 2017-04-11 NOTE — PROGRESS NOTES
[facility] -   General Surgery  Progress Note    Subjective:     History of Present Illness:  78 y/o female referred for partially obstructing mass of the terminal ileum/cecum. She presented with a few day history of worsening right lower quadrant abdominal pain, nausea/emesis. She underwent CT scan concerning for ileocecal mass and subsequent colonoscopy showed partially obstructing mass of the terminal ileum.  She has 30lb weight loss in recent months, denies any fever/chills, diarrhea, constipation. She currently reports crampy abdominal pain in the RLQ. Previously had gastric bypass, cholecystectomy, appendectomy.    Post-Op Info:  Procedure(s) (LRB):  EXPLORATORY-LAPAROTOMY (N/A)  COLECTOMY-PARTIAL (N/A)  LYSIS-ADHESION (N/A)   4 Days Post-Op     Interval History:  -nausea/emesis; +flatus/+bm, pain controlled    Medications:  Continuous Infusions:     Scheduled Meds:   gabapentin  100 mg Oral TID    heparin (porcine)  5,000 Units Subcutaneous Q8H    levothyroxine  75 mcg Oral Before breakfast    metoprolol tartrate  25 mg Oral BID    pantoprazole  40 mg Oral Daily    sertraline  100 mg Oral Daily     PRN Meds:cloNIDine, diphenhydrAMINE, hydrALAZINE, hydrocodone-acetaminophen 5-325mg, morphine, ondansetron, promethazine (PHENERGAN) IVPB     Review of patient's allergies indicates:   Allergen Reactions    Corticosteroids (glucocorticoids) Nausea Only and Other (See Comments)     Stomach pain, dizziness, headache    Oxycodone Other (See Comments)     Blood pressure dropped     Objective:     Vital Signs (Most Recent):  Temp: 98.3 °F (36.8 °C) (04/11/17 0844)  Pulse: 68 (04/11/17 0844)  Resp: 18 (04/11/17 0844)  BP: (!) 203/93 (04/11/17 0844)  SpO2: 97 % (04/11/17 0857) Vital Signs (24h Range):  Temp:  [96.5 °F (35.8 °C)-98.4 °F (36.9 °C)] 98.3 °F (36.8 °C)  Pulse:  [64-72] 68  Resp:  [18] 18  SpO2:  [92 %-97 %] 97 %  BP: (145-203)/(73-96) 203/93     Weight: 86.2 kg (190 lb)  Body mass index  is 32.61 kg/(m^2).    Intake/Output - Last 3 Shifts       04/09 0700 - 04/10 0659 04/10 0700 - 04/11 0659 04/11 0700 - 04/12 0659    P.O. 100 250     I.V. (mL/kg) 1763.8 (20.5) 823.8 (9.6)     IV Piggyback 0      Total Intake(mL/kg) 1863.8 (21.6) 1073.8 (12.5)     Urine (mL/kg/hr) 1700 (0.8) 2225 (1.1)     Other 0 (0)      Stool  0 (0)     Total Output 1700 2225      Net +163.8 -1151.3             Stool Occurrence  0 x           Physical Exam   Constitutional: She appears well-developed and well-nourished. No distress.   Cardiovascular: Normal rate and regular rhythm.    Pulmonary/Chest: Effort normal.   Abdominal: Soft. She exhibits no distension. Tenderness: attp.   Incision c,d,i   Vitals reviewed.      Significant Labs:  CBC:     Recent Labs  Lab 04/10/17  0523   WBC 7.17   RBC 2.86*   HGB 8.2*   HCT 25.9*      MCV 91   MCH 28.7   MCHC 31.7*     CMP:   Recent Labs  Lab 04/05/17  0807  04/09/17  0615   *  < > 132*   CALCIUM 10.0  < > 8.9   ALBUMIN 4.2  --   --    PROT 8.6*  --   --      < > 139   K 4.6  < > 4.6   CO2 24  < > 27   CL 98  < > 106   BUN 17  < > 13   CREATININE 1.1  < > 1.0   ALKPHOS 81  --   --    ALT 22  --   --    AST 37  --   --    BILITOT 0.4  --   --    < > = values in this interval not displayed.        Assessment/Plan:     * Lower abdominal pain  Partially obstructing mass of terminal ileum  Patient underwent a right hemicolectomy and jejunal resection for a presumed malignancy    Pain control  advance diet  Clear liquids    CAD, multiple vessel  Monitor for signs of chest pain    Malignant neoplasm of ileocecal valve  Post op day 4  Clears advance diet as tolerated  Pain is  controlled  Physical therapy    Dulcolax  Oral and IV narcotics    Will discuss with oncology once path results return      Orlando Moulton MD  General Surgery  [facility]

## 2017-04-12 LAB
ANION GAP SERPL CALC-SCNC: 9 MMOL/L
BASOPHILS # BLD AUTO: 0.02 K/UL
BASOPHILS NFR BLD: 0.2 %
BUN SERPL-MCNC: 9 MG/DL
CALCIUM SERPL-MCNC: 9.6 MG/DL
CHLORIDE SERPL-SCNC: 100 MMOL/L
CO2 SERPL-SCNC: 28 MMOL/L
CREAT SERPL-MCNC: 0.9 MG/DL
DIFFERENTIAL METHOD: ABNORMAL
EOSINOPHIL # BLD AUTO: 0.4 K/UL
EOSINOPHIL NFR BLD: 3.6 %
ERYTHROCYTE [DISTWIDTH] IN BLOOD BY AUTOMATED COUNT: 16.5 %
EST. GFR  (AFRICAN AMERICAN): >60 ML/MIN/1.73 M^2
EST. GFR  (NON AFRICAN AMERICAN): >60 ML/MIN/1.73 M^2
GLUCOSE SERPL-MCNC: 120 MG/DL
HCT VFR BLD AUTO: 32.4 %
HGB BLD-MCNC: 10.2 G/DL
LYMPHOCYTES # BLD AUTO: 3.5 K/UL
LYMPHOCYTES NFR BLD: 33.7 %
MCH RBC QN AUTO: 27.6 PG
MCHC RBC AUTO-ENTMCNC: 31.5 %
MCV RBC AUTO: 88 FL
MONOCYTES # BLD AUTO: 0.7 K/UL
MONOCYTES NFR BLD: 6.6 %
NEUTROPHILS # BLD AUTO: 5.8 K/UL
NEUTROPHILS NFR BLD: 55.9 %
PLATELET # BLD AUTO: 424 K/UL
PMV BLD AUTO: 9.7 FL
POTASSIUM SERPL-SCNC: 3.7 MMOL/L
RBC # BLD AUTO: 3.69 M/UL
SODIUM SERPL-SCNC: 137 MMOL/L
WBC # BLD AUTO: 10.39 K/UL

## 2017-04-12 PROCEDURE — 36415 COLL VENOUS BLD VENIPUNCTURE: CPT

## 2017-04-12 PROCEDURE — 80048 BASIC METABOLIC PNL TOTAL CA: CPT

## 2017-04-12 PROCEDURE — 85025 COMPLETE CBC W/AUTO DIFF WBC: CPT

## 2017-04-12 PROCEDURE — 25000003 PHARM REV CODE 250: Performed by: SURGERY

## 2017-04-12 PROCEDURE — 21400001 HC TELEMETRY ROOM

## 2017-04-12 PROCEDURE — 94761 N-INVAS EAR/PLS OXIMETRY MLT: CPT

## 2017-04-12 PROCEDURE — 99900035 HC TECH TIME PER 15 MIN (STAT)

## 2017-04-12 PROCEDURE — 25000003 PHARM REV CODE 250: Performed by: NURSE PRACTITIONER

## 2017-04-12 PROCEDURE — 25000003 PHARM REV CODE 250: Performed by: INTERNAL MEDICINE

## 2017-04-12 PROCEDURE — 96372 THER/PROPH/DIAG INJ SC/IM: CPT

## 2017-04-12 PROCEDURE — 63600175 PHARM REV CODE 636 W HCPCS: Performed by: NURSE PRACTITIONER

## 2017-04-12 PROCEDURE — 94799 UNLISTED PULMONARY SVC/PX: CPT

## 2017-04-12 RX ORDER — ONDANSETRON 4 MG/1
4 TABLET, ORALLY DISINTEGRATING ORAL EVERY 6 HOURS PRN
Status: DISCONTINUED | OUTPATIENT
Start: 2017-04-12 | End: 2017-04-13

## 2017-04-12 RX ORDER — DOCUSATE SODIUM 100 MG/1
100 CAPSULE, LIQUID FILLED ORAL 2 TIMES DAILY PRN
Qty: 30 CAPSULE | Refills: 0 | Status: SHIPPED | OUTPATIENT
Start: 2017-04-12 | End: 2017-04-27

## 2017-04-12 RX ORDER — PROMETHAZINE HYDROCHLORIDE 12.5 MG/1
12.5 TABLET ORAL EVERY 6 HOURS PRN
Status: DISCONTINUED | OUTPATIENT
Start: 2017-04-12 | End: 2017-04-13

## 2017-04-12 RX ORDER — ONDANSETRON 8 MG/1
8 TABLET, ORALLY DISINTEGRATING ORAL EVERY 8 HOURS PRN
Qty: 10 TABLET | Refills: 0 | Status: SHIPPED | OUTPATIENT
Start: 2017-04-12 | End: 2017-05-19 | Stop reason: SDUPTHER

## 2017-04-12 RX ORDER — HYDROCODONE BITARTRATE AND ACETAMINOPHEN 5; 325 MG/1; MG/1
1 TABLET ORAL EVERY 4 HOURS PRN
Qty: 30 TABLET | Refills: 0 | Status: SHIPPED | OUTPATIENT
Start: 2017-04-12 | End: 2017-06-01 | Stop reason: CLARIF

## 2017-04-12 RX ADMIN — ONDANSETRON 4 MG: 4 TABLET, ORALLY DISINTEGRATING ORAL at 07:04

## 2017-04-12 RX ADMIN — VERAPAMIL HYDROCHLORIDE 120 MG: 120 TABLET, FILM COATED, EXTENDED RELEASE ORAL at 09:04

## 2017-04-12 RX ADMIN — ONDANSETRON 4 MG: 4 TABLET, ORALLY DISINTEGRATING ORAL at 09:04

## 2017-04-12 RX ADMIN — LEVOTHYROXINE SODIUM 75 MCG: 75 TABLET ORAL at 05:04

## 2017-04-12 RX ADMIN — METOPROLOL TARTRATE 25 MG: 25 TABLET ORAL at 08:04

## 2017-04-12 RX ADMIN — ROSUVASTATIN CALCIUM 10 MG: 10 TABLET, FILM COATED ORAL at 08:04

## 2017-04-12 RX ADMIN — HYDROCODONE BITARTRATE AND ACETAMINOPHEN 1 TABLET: 5; 325 TABLET ORAL at 08:04

## 2017-04-12 RX ADMIN — GABAPENTIN 100 MG: 100 CAPSULE ORAL at 01:04

## 2017-04-12 RX ADMIN — PANTOPRAZOLE SODIUM 40 MG: 40 TABLET, DELAYED RELEASE ORAL at 08:04

## 2017-04-12 RX ADMIN — GABAPENTIN 100 MG: 100 CAPSULE ORAL at 05:04

## 2017-04-12 RX ADMIN — METOPROLOL TARTRATE 25 MG: 25 TABLET ORAL at 09:04

## 2017-04-12 RX ADMIN — GABAPENTIN 100 MG: 100 CAPSULE ORAL at 09:04

## 2017-04-12 RX ADMIN — HEPARIN SODIUM 5000 UNITS: 5000 INJECTION, SOLUTION INTRAVENOUS; SUBCUTANEOUS at 05:04

## 2017-04-12 RX ADMIN — HEPARIN SODIUM 5000 UNITS: 5000 INJECTION, SOLUTION INTRAVENOUS; SUBCUTANEOUS at 09:04

## 2017-04-12 RX ADMIN — CLOPIDOGREL BISULFATE 75 MG: 75 TABLET ORAL at 08:04

## 2017-04-12 RX ADMIN — PROMETHAZINE HYDROCHLORIDE 12.5 MG: 12.5 TABLET ORAL at 01:04

## 2017-04-12 RX ADMIN — SERTRALINE HYDROCHLORIDE 100 MG: 50 TABLET ORAL at 08:04

## 2017-04-12 RX ADMIN — PROMETHAZINE HYDROCHLORIDE 12.5 MG: 12.5 TABLET ORAL at 08:04

## 2017-04-12 NOTE — ASSESSMENT & PLAN NOTE
Post op day 5  Tolerating full liquids advance to regular diet  Pain is  controlled  Physical therapy    Dulcolax  Ok to discharge home today

## 2017-04-12 NOTE — PLAN OF CARE
Problem: Patient Care Overview  Goal: Plan of Care Review  Outcome: Ongoing (interventions implemented as appropriate)  Plan of care reviewed with patient. AAO x3. V/S stable. Patient complaining of some abdominal pain, prn med given. Patient on telemetry NS rhythm noted. Pt has no IV access. Diet advanced to full liquid diet. Fall precautions in place, patient free from fall/injury. Patient has no questions at this time. Will continue to monitor.

## 2017-04-12 NOTE — PROGRESS NOTES
"[facility] Hospital Medicine  Progress Note    Patient Name: Violet Swenson  MRN: [id number]  Patient Class: IP- Inpatient   Admission Date: 4/5/2017  Length of Stay: 7 days  Attending Physician: Nikki Brewer MD  Primary Care Provider: Marti Coronel MD        Subjective:     Principal Problem:Lower abdominal pain    HPI:  Violet Swenson is a 77 year old female with a PMHx of Hypothyroidism, HTN, HLD, GERD, Depression, GERD, Depression, CAD, Gastric bypass '93, PPM, who presented to the ER with c/o generalized abdominal pain for 2 days. Pain describes as "cramping." Associated symptoms included: nausea, vomiting, and chest pain.  ED workup revealed:  WBC 13.25, glucose 119, lipase 351, amylase 189, troponin 0.179. CT abdomen/pelvis with soft tissue mass in the RUQ, recommend endoscopy with biopsy. CXR negative. ED discussed case with Cardiology.     Hospital Course:  Colonoscopy completed with biopsy of mass in the terminal ileum. General surgery was consulted. Awaiting pathology results. 4/7/17 right gisele-colectomy with terminal ileum and jejunal resection. PCA pump discontinued. Echo showed EF 40%. Discussed with Dr. Shah, General Surgery. Patient encouraged to get out of bed TID.   Positive bowel sounds , still no bowel movement       No new subjective & objective note has been filed under this hospital service since the last note was generated.    Assessment/Plan:      Essential hypertension  - improved  - Continue to adjust meds      Hyperlipidemia  - Continue Statin      Hypothyroidism (acquired)  -TSH nml  - Levothyroxine      CAD, multiple vessel  - Continue ASA and Statin / bb  -last heart Cath 2015 with patent stents    Malignant neoplasm of ileocecal valve  Partial obstructing mass at terminal ileus s/p right hemicolectomy and jejunal resection   - clear liquid diet   -pathology pending      Ischemic cardiomyopathy  Stable , continue CHF core " measures    VTE Risk Mitigation         Ordered     heparin (porcine) injection 5,000 Units  Every 8 hours     Route:  Subcutaneous        04/07/17 1554     Medium Risk of VTE  Once      04/07/17 1554     Place sequential compression device  Until discontinued      04/07/17 1447          Nikki Brewer MD  Department of Hospital Medicine   [facility]

## 2017-04-12 NOTE — PT/OT/SLP PROGRESS
Physical Therapy      Violet Swenson  MRN: [id number]    ATTEMPTED P.T. TX THIS AM, PT REFUSED DUE TO C/O NAUSEA WITH VOMITING, NOTIFIED NURSE ARABELLA, WILL ASSESS PT NEXT VISIT    Genie Joy, PT   4/12/2017  1022

## 2017-04-12 NOTE — PT/OT/SLP PROGRESS
Physical Therapy      Violet Swenson  MRN: [id number]    RE-ATTEMPTED P.T. TX THIS AM, PT REFUSED DESPITE ENCOURAGEMENT, PT C/O STILL BEING NAUSEATED, WILL ASSESS PT NEXT VISIT    Genie Joy, PT   4/12/2017  3328

## 2017-04-12 NOTE — PLAN OF CARE
Problem: Patient Care Overview  Goal: Plan of Care Review  Outcome: Ongoing (interventions implemented as appropriate)  Patient tolerated clear liquids this morning. Diet advanced to full liquids. Patient became nauseated and began vomiting. Patient given promethazine. Symptom's. Patient reported symptoms relived after medication administration. Patient was scheduled to be DC'd today. Per Dr. Breaux, Patient,s discharge will be delayed until tomorrow or once N/V improved.

## 2017-04-12 NOTE — NURSING
Patient complained of N/V after eating full liquid diet for lunch. . Diet changed to clear liquid per Dr. Brewer.

## 2017-04-12 NOTE — ASSESSMENT & PLAN NOTE
Partially obstructing mass of terminal ileum  Patient underwent a right hemicolectomy and jejunal resection for a presumed malignancy    Pain control  advance diet

## 2017-04-12 NOTE — PLAN OF CARE
Problem: Patient Care Overview  Goal: Plan of Care Review  Outcome: Ongoing (interventions implemented as appropriate)  Patient is free from falls and injury this shift. Patient had her first BM today since her surgery. Patient's diet was increased to clear liquids. Patient tolerated well. Will advance to full liquids in the am. Possible DC tomorrow.

## 2017-04-12 NOTE — PROGRESS NOTES
[facility] -   General Surgery  Progress Note    Subjective:     History of Present Illness:  78 y/o female referred for partially obstructing mass of the terminal ileum/cecum. She presented with a few day history of worsening right lower quadrant abdominal pain, nausea/emesis. She underwent CT scan concerning for ileocecal mass and subsequent colonoscopy showed partially obstructing mass of the terminal ileum.  She has 30lb weight loss in recent months, denies any fever/chills, diarrhea, constipation. She currently reports crampy abdominal pain in the RLQ. Previously had gastric bypass, cholecystectomy, appendectomy.    Post-Op Info:  Procedure(s) (LRB):  EXPLORATORY-LAPAROTOMY (N/A)  COLECTOMY-PARTIAL (N/A)  LYSIS-ADHESION (N/A)   5 Days Post-Op     Interval History:   tolerating diet, +flatus/BM    Medications:  Continuous Infusions:     Scheduled Meds:   clopidogrel  75 mg Oral Daily    gabapentin  100 mg Oral TID    heparin (porcine)  5,000 Units Subcutaneous Q8H    levothyroxine  75 mcg Oral Before breakfast    metoprolol tartrate  25 mg Oral BID    pantoprazole  40 mg Oral Daily    rosuvastatin  10 mg Oral Daily    sertraline  100 mg Oral Daily    verapamil  120 mg Oral Nightly     PRN Meds:cloNIDine, diphenhydrAMINE, hydrALAZINE, hydrocodone-acetaminophen 5-325mg, morphine, ondansetron, promethazine (PHENERGAN) IVPB     Review of patient's allergies indicates:   Allergen Reactions    Corticosteroids (glucocorticoids) Nausea Only and Other (See Comments)     Stomach pain, dizziness, headache    Oxycodone Other (See Comments)     Blood pressure dropped     Objective:     Vital Signs (Most Recent):  Temp: 98 °F (36.7 °C) (04/12/17 0427)  Pulse: 61 (04/12/17 0427)  Resp: 18 (04/12/17 0427)  BP: (!) 152/78 (04/12/17 0427)  SpO2: 96 % (04/12/17 0427) Vital Signs (24h Range):  Temp:  [97.9 °F (36.6 °C)-98.3 °F (36.8 °C)] 98 °F (36.7 °C)  Pulse:  [59-68] 61  Resp:  [16-18] 18  SpO2:  [94  %-98 %] 96 %  BP: (140-203)/(75-93) 152/78     Weight: 86.2 kg (190 lb)  Body mass index is 32.61 kg/(m^2).    Intake/Output - Last 3 Shifts       04/10 0700 - 04/11 0659 04/11 0700 - 04/12 0659 04/12 0700 - 04/13 0659    P.O. 250 236     I.V. (mL/kg) 823.8 (9.6)      IV Piggyback       Total Intake(mL/kg) 1073.8 (12.5) 236 (2.7)     Urine (mL/kg/hr) 2225 (1.1) 300 (0.1) 100 (0.7)    Other       Stool 0 (0) 0 (0)     Total Output 2225 300 100    Net -1151.3 -64 -100           Urine Occurrence  2 x     Stool Occurrence 0 x 2 x           Physical Exam   Constitutional: She appears well-developed and well-nourished. No distress.   Cardiovascular: Normal rate and regular rhythm.    Pulmonary/Chest: Effort normal.   Abdominal: Soft. She exhibits no distension. Tenderness: attp.   Incision c,d,i   Vitals reviewed.      Significant Labs:  CBC:     Recent Labs  Lab 04/10/17  0523   WBC 7.17   RBC 2.86*   HGB 8.2*   HCT 25.9*      MCV 91   MCH 28.7   MCHC 31.7*     CMP:     Recent Labs  Lab 04/09/17  0615   *   CALCIUM 8.9      K 4.6   CO2 27      BUN 13   CREATININE 1.0           Assessment/Plan:     * Lower abdominal pain  Partially obstructing mass of terminal ileum  Patient underwent a right hemicolectomy and jejunal resection for a presumed malignancy    Pain control  advance diet      Essential hypertension  Home medications for blood pressure control    Hyperlipidemia  Home medications once a diet as tolerated    Hypothyroidism (acquired)  Continue Synthroid  orally    Malignant neoplasm of ileocecal valve  Post op day 5  Tolerating full liquids advance to regular diet  Pain is  controlled  Physical therapy    Dulcolax  Ok to discharge home today      Orlando Moulton MD  General Surgery  [facility]

## 2017-04-12 NOTE — SUBJECTIVE & OBJECTIVE
Interval History:   tolerating diet, +flatus/BM    Medications:  Continuous Infusions:     Scheduled Meds:   clopidogrel  75 mg Oral Daily    gabapentin  100 mg Oral TID    heparin (porcine)  5,000 Units Subcutaneous Q8H    levothyroxine  75 mcg Oral Before breakfast    metoprolol tartrate  25 mg Oral BID    pantoprazole  40 mg Oral Daily    rosuvastatin  10 mg Oral Daily    sertraline  100 mg Oral Daily    verapamil  120 mg Oral Nightly     PRN Meds:cloNIDine, diphenhydrAMINE, hydrALAZINE, hydrocodone-acetaminophen 5-325mg, morphine, ondansetron, promethazine (PHENERGAN) IVPB     Review of patient's allergies indicates:   Allergen Reactions    Corticosteroids (glucocorticoids) Nausea Only and Other (See Comments)     Stomach pain, dizziness, headache    Oxycodone Other (See Comments)     Blood pressure dropped     Objective:     Vital Signs (Most Recent):  Temp: 98 °F (36.7 °C) (04/12/17 0427)  Pulse: 61 (04/12/17 0427)  Resp: 18 (04/12/17 0427)  BP: (!) 152/78 (04/12/17 0427)  SpO2: 96 % (04/12/17 0427) Vital Signs (24h Range):  Temp:  [97.9 °F (36.6 °C)-98.3 °F (36.8 °C)] 98 °F (36.7 °C)  Pulse:  [59-68] 61  Resp:  [16-18] 18  SpO2:  [94 %-98 %] 96 %  BP: (140-203)/(75-93) 152/78     Weight: 86.2 kg (190 lb)  Body mass index is 32.61 kg/(m^2).    Intake/Output - Last 3 Shifts       04/10 0700 - 04/11 0659 04/11 0700 - 04/12 0659 04/12 0700 - 04/13 0659    P.O. 250 236     I.V. (mL/kg) 823.8 (9.6)      IV Piggyback       Total Intake(mL/kg) 1073.8 (12.5) 236 (2.7)     Urine (mL/kg/hr) 2225 (1.1) 300 (0.1) 100 (0.7)    Other       Stool 0 (0) 0 (0)     Total Output 2225 300 100    Net -1151.3 -64 -100           Urine Occurrence  2 x     Stool Occurrence 0 x 2 x           Physical Exam   Constitutional: She appears well-developed and well-nourished. No distress.   Cardiovascular: Normal rate and regular rhythm.    Pulmonary/Chest: Effort normal.   Abdominal: Soft. She exhibits no distension. Tenderness:  attp.   Incision c,d,i   Vitals reviewed.      Significant Labs:  CBC:     Recent Labs  Lab 04/10/17  0523   WBC 7.17   RBC 2.86*   HGB 8.2*   HCT 25.9*      MCV 91   MCH 28.7   MCHC 31.7*     CMP:     Recent Labs  Lab 04/09/17  0615   *   CALCIUM 8.9      K 4.6   CO2 27      BUN 13   CREATININE 1.0

## 2017-04-13 LAB
ANION GAP SERPL CALC-SCNC: 10 MMOL/L
BUN SERPL-MCNC: 9 MG/DL
CALCIUM SERPL-MCNC: 9 MG/DL
CHLORIDE SERPL-SCNC: 102 MMOL/L
CO2 SERPL-SCNC: 28 MMOL/L
CREAT SERPL-MCNC: 0.9 MG/DL
EST. GFR  (AFRICAN AMERICAN): >60 ML/MIN/1.73 M^2
EST. GFR  (NON AFRICAN AMERICAN): >60 ML/MIN/1.73 M^2
GLUCOSE SERPL-MCNC: 97 MG/DL
POTASSIUM SERPL-SCNC: 3.8 MMOL/L
SODIUM SERPL-SCNC: 140 MMOL/L

## 2017-04-13 PROCEDURE — 25000003 PHARM REV CODE 250: Performed by: INTERNAL MEDICINE

## 2017-04-13 PROCEDURE — 80048 BASIC METABOLIC PNL TOTAL CA: CPT

## 2017-04-13 PROCEDURE — 25000003 PHARM REV CODE 250: Performed by: SURGERY

## 2017-04-13 PROCEDURE — 63600175 PHARM REV CODE 636 W HCPCS: Performed by: NURSE PRACTITIONER

## 2017-04-13 PROCEDURE — 25000003 PHARM REV CODE 250: Performed by: NURSE PRACTITIONER

## 2017-04-13 PROCEDURE — 97116 GAIT TRAINING THERAPY: CPT

## 2017-04-13 PROCEDURE — 21400001 HC TELEMETRY ROOM

## 2017-04-13 PROCEDURE — 97110 THERAPEUTIC EXERCISES: CPT

## 2017-04-13 PROCEDURE — 36415 COLL VENOUS BLD VENIPUNCTURE: CPT

## 2017-04-13 RX ORDER — SODIUM CHLORIDE, SODIUM LACTATE, POTASSIUM CHLORIDE, CALCIUM CHLORIDE 600; 310; 30; 20 MG/100ML; MG/100ML; MG/100ML; MG/100ML
INJECTION, SOLUTION INTRAVENOUS CONTINUOUS
Status: DISCONTINUED | OUTPATIENT
Start: 2017-04-13 | End: 2017-04-14 | Stop reason: HOSPADM

## 2017-04-13 RX ORDER — ONDANSETRON 2 MG/ML
4 INJECTION INTRAMUSCULAR; INTRAVENOUS EVERY 6 HOURS PRN
Status: DISCONTINUED | OUTPATIENT
Start: 2017-04-13 | End: 2017-04-14 | Stop reason: HOSPADM

## 2017-04-13 RX ORDER — DOCUSATE SODIUM 100 MG/1
100 CAPSULE, LIQUID FILLED ORAL 2 TIMES DAILY
Status: DISCONTINUED | OUTPATIENT
Start: 2017-04-13 | End: 2017-04-14 | Stop reason: HOSPADM

## 2017-04-13 RX ORDER — LORAZEPAM 2 MG/ML
0.5 INJECTION INTRAMUSCULAR DAILY PRN
Status: DISCONTINUED | OUTPATIENT
Start: 2017-04-13 | End: 2017-04-14 | Stop reason: HOSPADM

## 2017-04-13 RX ORDER — SIMETHICONE 80 MG
1 TABLET,CHEWABLE ORAL 3 TIMES DAILY PRN
Status: DISCONTINUED | OUTPATIENT
Start: 2017-04-13 | End: 2017-04-14 | Stop reason: HOSPADM

## 2017-04-13 RX ADMIN — CLOPIDOGREL BISULFATE 75 MG: 75 TABLET ORAL at 08:04

## 2017-04-13 RX ADMIN — PROMETHAZINE HYDROCHLORIDE 12.5 MG: 12.5 TABLET ORAL at 01:04

## 2017-04-13 RX ADMIN — HYDROCODONE BITARTRATE AND ACETAMINOPHEN 1 TABLET: 5; 325 TABLET ORAL at 08:04

## 2017-04-13 RX ADMIN — VERAPAMIL HYDROCHLORIDE 120 MG: 120 TABLET, FILM COATED, EXTENDED RELEASE ORAL at 09:04

## 2017-04-13 RX ADMIN — METOPROLOL TARTRATE 25 MG: 25 TABLET ORAL at 08:04

## 2017-04-13 RX ADMIN — SERTRALINE HYDROCHLORIDE 100 MG: 50 TABLET ORAL at 08:04

## 2017-04-13 RX ADMIN — PANTOPRAZOLE SODIUM 40 MG: 40 TABLET, DELAYED RELEASE ORAL at 08:04

## 2017-04-13 RX ADMIN — DOCUSATE SODIUM 100 MG: 100 CAPSULE, LIQUID FILLED ORAL at 08:04

## 2017-04-13 RX ADMIN — SODIUM CHLORIDE, SODIUM LACTATE, POTASSIUM CHLORIDE, AND CALCIUM CHLORIDE: .6; .31; .03; .02 INJECTION, SOLUTION INTRAVENOUS at 04:04

## 2017-04-13 RX ADMIN — ONDANSETRON 4 MG: 2 INJECTION INTRAMUSCULAR; INTRAVENOUS at 07:04

## 2017-04-13 RX ADMIN — GABAPENTIN 100 MG: 100 CAPSULE ORAL at 10:04

## 2017-04-13 RX ADMIN — HEPARIN SODIUM 5000 UNITS: 5000 INJECTION, SOLUTION INTRAVENOUS; SUBCUTANEOUS at 03:04

## 2017-04-13 RX ADMIN — HEPARIN SODIUM 5000 UNITS: 5000 INJECTION, SOLUTION INTRAVENOUS; SUBCUTANEOUS at 05:04

## 2017-04-13 RX ADMIN — GABAPENTIN 100 MG: 100 CAPSULE ORAL at 05:04

## 2017-04-13 RX ADMIN — ONDANSETRON 4 MG: 4 TABLET, ORALLY DISINTEGRATING ORAL at 09:04

## 2017-04-13 RX ADMIN — LEVOTHYROXINE SODIUM 75 MCG: 75 TABLET ORAL at 05:04

## 2017-04-13 RX ADMIN — DOCUSATE SODIUM 100 MG: 100 CAPSULE, LIQUID FILLED ORAL at 09:04

## 2017-04-13 RX ADMIN — GABAPENTIN 100 MG: 100 CAPSULE ORAL at 03:04

## 2017-04-13 RX ADMIN — ROSUVASTATIN CALCIUM 10 MG: 10 TABLET, FILM COATED ORAL at 08:04

## 2017-04-13 RX ADMIN — HEPARIN SODIUM 5000 UNITS: 5000 INJECTION, SOLUTION INTRAVENOUS; SUBCUTANEOUS at 10:04

## 2017-04-13 NOTE — PLAN OF CARE
Order for bedside commode obtained.  Contacted Alesia with [facility] and determined that this item (BSC) can be pulled from [facility] closet here.  Bedside commode pulled from [facility] closet and delivered to patient's room.      PLAN:  Should patient be discharged over weekend, [facility] will need to be notified and discharge documentation will need to be faxed to them to complete HH referral

## 2017-04-13 NOTE — PLAN OF CARE
Problem: Patient Care Overview  Goal: Plan of Care Review  Outcome: Ongoing (interventions implemented as appropriate)  Patient is AAOx4. Patient denies pain, but c/o nausea intermittently. Patient ambulated up the harris and sat up in chair. Pt tolerated well. Pt remained free from falls and injury throughout this shift. Will continue to monitor. 12hr chart check completed.

## 2017-04-13 NOTE — SUBJECTIVE & OBJECTIVE
Interval History:  nausea with emesis yesterday, continuing to pass flatus/BM's, pain controlled    Medications:  Continuous Infusions:     Scheduled Meds:   clopidogrel  75 mg Oral Daily    docusate sodium  100 mg Oral BID    gabapentin  100 mg Oral TID    heparin (porcine)  5,000 Units Subcutaneous Q8H    levothyroxine  75 mcg Oral Before breakfast    metoprolol tartrate  25 mg Oral BID    pantoprazole  40 mg Oral Daily    rosuvastatin  10 mg Oral Daily    sertraline  100 mg Oral Daily    verapamil  120 mg Oral Nightly     PRN Meds:cloNIDine, diphenhydrAMINE, hydrALAZINE, hydrocodone-acetaminophen 5-325mg, morphine, ondansetron, promethazine     Review of patient's allergies indicates:   Allergen Reactions    Corticosteroids (glucocorticoids) Nausea Only and Other (See Comments)     Stomach pain, dizziness, headache    Oxycodone Other (See Comments)     Blood pressure dropped     Objective:     Vital Signs (Most Recent):  Temp: 98.1 °F (36.7 °C) (04/13/17 0739)  Pulse: 70 (04/13/17 0739)  Resp: 18 (04/13/17 0739)  BP: (!) 175/75 (04/13/17 0739)  SpO2: 98 % (04/13/17 0739) Vital Signs (24h Range):  Temp:  [97.5 °F (36.4 °C)-98.1 °F (36.7 °C)] 98.1 °F (36.7 °C)  Pulse:  [65-84] 70  Resp:  [17-20] 18  SpO2:  [95 %-99 %] 98 %  BP: (158-188)/() 175/75     Weight: 86.2 kg (190 lb)  Body mass index is 32.61 kg/(m^2).    Intake/Output - Last 3 Shifts       04/11 0700 - 04/12 0659 04/12 0700 - 04/13 0659 04/13 0700 - 04/14 0659    P.O. 236 450     I.V. (mL/kg)       Total Intake(mL/kg) 236 (2.7) 450 (5.2)     Urine (mL/kg/hr) 300 (0.1) 1000 (0.5)     Stool 0 (0) 0 (0)     Total Output 300 1000      Net -64 -550             Urine Occurrence 2 x 1 x     Stool Occurrence 2 x 1 x           Physical Exam   Constitutional: She appears well-developed and well-nourished. No distress.   Cardiovascular: Normal rate and regular rhythm.    Pulmonary/Chest: Effort normal.   Abdominal: Soft. She exhibits no distension.  Tenderness: attp.   Incision c,d,i   Vitals reviewed.      Significant Labs:  CBC:     Recent Labs  Lab 04/12/17  0920   WBC 10.39   RBC 3.69*   HGB 10.2*   HCT 32.4*   *   MCV 88   MCH 27.6   MCHC 31.5*     CMP:     Recent Labs  Lab 04/13/17  0434   GLU 97   CALCIUM 9.0      K 3.8   CO2 28      BUN 9   CREATININE 0.9

## 2017-04-13 NOTE — PROGRESS NOTES
[facility] -   General Surgery  Progress Note    Subjective:     History of Present Illness:  76 y/o female referred for partially obstructing mass of the terminal ileum/cecum. She presented with a few day history of worsening right lower quadrant abdominal pain, nausea/emesis. She underwent CT scan concerning for ileocecal mass and subsequent colonoscopy showed partially obstructing mass of the terminal ileum.  She has 30lb weight loss in recent months, denies any fever/chills, diarrhea, constipation. She currently reports crampy abdominal pain in the RLQ. Previously had gastric bypass, cholecystectomy, appendectomy.    Post-Op Info:  Procedure(s) (LRB):  EXPLORATORY-LAPAROTOMY (N/A)  COLECTOMY-PARTIAL (N/A)  LYSIS-ADHESION (N/A)   6 Days Post-Op     Interval History:  nausea with emesis yesterday, continuing to pass flatus/BM's, pain controlled    Medications:  Continuous Infusions:     Scheduled Meds:   clopidogrel  75 mg Oral Daily    docusate sodium  100 mg Oral BID    gabapentin  100 mg Oral TID    heparin (porcine)  5,000 Units Subcutaneous Q8H    levothyroxine  75 mcg Oral Before breakfast    metoprolol tartrate  25 mg Oral BID    pantoprazole  40 mg Oral Daily    rosuvastatin  10 mg Oral Daily    sertraline  100 mg Oral Daily    verapamil  120 mg Oral Nightly     PRN Meds:cloNIDine, diphenhydrAMINE, hydrALAZINE, hydrocodone-acetaminophen 5-325mg, morphine, ondansetron, promethazine     Review of patient's allergies indicates:   Allergen Reactions    Corticosteroids (glucocorticoids) Nausea Only and Other (See Comments)     Stomach pain, dizziness, headache    Oxycodone Other (See Comments)     Blood pressure dropped     Objective:     Vital Signs (Most Recent):  Temp: 98.1 °F (36.7 °C) (04/13/17 0739)  Pulse: 70 (04/13/17 0739)  Resp: 18 (04/13/17 0739)  BP: (!) 175/75 (04/13/17 0739)  SpO2: 98 % (04/13/17 0739) Vital Signs (24h Range):  Temp:  [97.5 °F (36.4 °C)-98.1 °F (36.7 °C)]  98.1 °F (36.7 °C)  Pulse:  [65-84] 70  Resp:  [17-20] 18  SpO2:  [95 %-99 %] 98 %  BP: (158-188)/() 175/75     Weight: 86.2 kg (190 lb)  Body mass index is 32.61 kg/(m^2).    Intake/Output - Last 3 Shifts       04/11 0700 - 04/12 0659 04/12 0700 - 04/13 0659 04/13 0700 - 04/14 0659    P.O. 236 450     I.V. (mL/kg)       Total Intake(mL/kg) 236 (2.7) 450 (5.2)     Urine (mL/kg/hr) 300 (0.1) 1000 (0.5)     Stool 0 (0) 0 (0)     Total Output 300 1000      Net -64 -550             Urine Occurrence 2 x 1 x     Stool Occurrence 2 x 1 x           Physical Exam   Constitutional: She appears well-developed and well-nourished. No distress.   Cardiovascular: Normal rate and regular rhythm.    Pulmonary/Chest: Effort normal.   Abdominal: Soft. She exhibits no distension. Tenderness: attp.   Incision c,d,i   Vitals reviewed.      Significant Labs:  CBC:     Recent Labs  Lab 04/12/17  0920   WBC 10.39   RBC 3.69*   HGB 10.2*   HCT 32.4*   *   MCV 88   MCH 27.6   MCHC 31.5*     CMP:     Recent Labs  Lab 04/13/17  0434   GLU 97   CALCIUM 9.0      K 3.8   CO2 28      BUN 9   CREATININE 0.9           Assessment/Plan:     * Lower abdominal pain  Partially obstructing mass of terminal ileum  Patient underwent a right hemicolectomy and jejunal resection for a presumed malignancy    Pain control  advance diet      Essential hypertension  Home medications for blood pressure control    Hyperlipidemia  Home medications once a diet as tolerated    Hypothyroidism (acquired)  Continue Synthroid  orally    Malignant neoplasm of ileocecal valve  Partially obstructing mass of terminal ileum  Patient underwent a right hemicolectomy and jejunal resection for a presumed malignancy  Post op day 6  Liquids as tolerated  Pain is  controlled  Physical therapy        Kub - abdominal distention        Orlando Moulton MD  General Surgery  [facility]

## 2017-04-13 NOTE — PROGRESS NOTES
"[facility] Hospital Medicine  Progress Note    Patient Name: Violet Swenson  MRN: [id number]  Patient Class: IP- Inpatient   Admission Date: 4/5/2017  Length of Stay: 8 days  Attending Physician: Nikki Brewer MD  Primary Care Provider: Marti Coronel MD        Subjective:     Principal Problem:Lower abdominal pain    HPI:  Violet Swenson is a 77 year old female with a PMHx of Hypothyroidism, HTN, HLD, GERD, Depression, GERD, Depression, CAD, Gastric bypass '93, PPM, who presented to the ER with c/o generalized abdominal pain for 2 days. Pain describes as "cramping." Associated symptoms included: nausea, vomiting, and chest pain.  ED workup revealed:  WBC 13.25, glucose 119, lipase 351, amylase 189, troponin 0.179. CT abdomen/pelvis with soft tissue mass in the RUQ, recommend endoscopy with biopsy. CXR negative. ED discussed case with Cardiology.     Hospital Course:  Colonoscopy completed with biopsy of mass in the terminal ileum. General surgery was consulted. Awaiting pathology results. 4/7/17 right gisele-colectomy with terminal ileum and jejunal resection. PCA pump discontinued. Echo showed EF 40%. Discussed with Dr. Shah, General Surgery. Patient encouraged to get out of bed TID.   Positive bowel sounds , still no bowel movement   Continued intolerance to regular diet-KUB done 04/13/17- nonobstructive bowel pattern       No new subjective & objective note has been filed under this hospital service since the last note was generated.    Assessment/Plan:      Essential hypertension  - improved  - Continue to adjust meds      Hyperlipidemia  - Continue Statin      Hypothyroidism (acquired)  -TSH nml  - Levothyroxine      CAD, multiple vessel  - Continue ASA and Statin / bb  -last heart Cath 2015 with patent stents    Chest pain  - resolved    Malignant neoplasm of ileocecal valve  Partial obstructing mass at terminal ileus s/p right hemicolectomy and jejunal " resection   - clear liquid diet   -pathology pending      Ischemic cardiomyopathy  Stable , continue CHF core measures    VTE Risk Mitigation         Ordered     heparin (porcine) injection 5,000 Units  Every 8 hours     Route:  Subcutaneous        04/07/17 1554     Medium Risk of VTE  Once      04/07/17 1554     Place sequential compression device  Until discontinued      04/07/17 1447          Nikki Brewer MD  Department of Hospital Medicine   [facility]

## 2017-04-13 NOTE — ASSESSMENT & PLAN NOTE
Partially obstructing mass of terminal ileum  Patient underwent a right hemicolectomy and jejunal resection for a presumed malignancy  Post op day 6  Liquids as tolerated  Pain is  controlled  Physical therapy        Kub - abdominal distention

## 2017-04-13 NOTE — PLAN OF CARE
Problem: Patient Care Overview  Goal: Plan of Care Review  Outcome: Ongoing (interventions implemented as appropriate)  IMPROVED TOLERANCE TO P.T. TX, C/O DIZZINESS AND NAUSEA

## 2017-04-13 NOTE — PLAN OF CARE
Problem: Patient Care Overview  Goal: Plan of Care Review  Outcome: Ongoing (interventions implemented as appropriate)  Room air, respirations even and unlabored, no distress noted on assessment, pain and nausea, no shortness of breath, midline incision open to air with staples no drainage, daughter at bedside, refused bed alarm

## 2017-04-13 NOTE — PT/OT/SLP PROGRESS
Physical Therapy  Treatment    Violet Swenson   MRN: [id number]   Admitting Diagnosis: Lower abdominal pain    PT Received On: 17  PT Start Time: 1020     PT Stop Time: 1045    PT Total Time (min): 25 min       Billable Minutes:  Gait [id number] and Therapeutic Exercise 10    Treatment Type: Treatment  PT/PTA: PT       General Precautions: Standard, FALL  Orthopedic Precautions: N/A     Subjective:  Communicated with NURSE MONAE prior to session.  Pain Ratin/10  Location: abdomen  Pain Addressed: Nurse notified    Objective:   Patient found with: telemetry    Functional Mobility:  Bed Mobility:   Rolling/Turning to Left: Stand by assistance  Rolling/Turning Right: Stand by assistance  Scooting/Bridging: Stand by Assistance  Supine to Sit: Stand by Assistance    Transfers:  Sit <> Stand Assistance: Stand By Assistance  Sit <> Stand Assistive Device: Rolling Walker  Bed <> Chair Technique: Stand Pivot  Bed <> Chair Assistance: Stand By Assistance  Bed <> Chair Assistive Device: Rolling Walker    Gait:   Gait Distance: PT AMB 30' X 2 TRIALS WITH RW AND CGA, SLOW PACED GAIT, C/O DIZZINESS AND NAUSEA SO RETURNED TO ROOM  Assistance 1: Contact Guard Assistance  Gait Assistive Device: Rolling walker  Gait Pattern: swing-through gait  Gait Deviation(s): decreased joseph, decreased step length    Balance:   Static Sit: GOOD  Dynamic Sit: GOOD  Static Stand: FAIR  Dynamic stand:  FAIR    Therapeutic Activities and Exercises:  PT SLOW MOVING WITH ALL FUNCTIONAL MOBILITY, CONSTANT CUES TO STAY ON TASK.  PT EDUCATED IN AND PERFORMED BLE THEREX X 20 REPS AROM, PT DONNED ROBE WITH THERESA, MAXA FOR DONNING SOCKS    Patient left up in chair with all lines intact, call button in reach, NURSE notified and DAUGHTER present.    Assessment:  Violet Swenson is a 77 y.o. female with a medical diagnosis of Lower abdominal pain   PT WILL BENEFIT FROM CONT. SKILLED P.T. TO ADDRESS IMPAIRMENTS    Rehab identified  problem list/impairments: Rehab identified problem list/impairments: weakness, impaired endurance, impaired functional mobilty, gait instability, decreased safety awareness, impaired coordination, pain, impaired balance    Rehab potential is good.    Activity tolerance: Fair    Discharge recommendations: Discharge Facility/Level Of Care Needs: home health PT (FAMILY ASSISTANCE AS NEEDED)     Barriers to discharge: Barriers to Discharge: None    Equipment recommendations: Equipment Needed After Discharge: bedside commode, walker, rolling, bath bench     GOALS:   Physical Therapy Goals        Problem: Physical Therapy Goal    Goal Priority Disciplines Outcome Goal Variances Interventions   Physical Therapy Goal     PT/OT, PT      Description:  PT eval complete.  Pt will be able to perform the following in 7 days  1.  Pt will be IND with bed mobility  2.  Pt will transfer bed to chair IND  3.  Pt will ambulate 150 feet without AD with SPV/IND            PLAN:    Patient to be seen 5 x/week  to address the above listed problems via gait training, therapeutic activities, therapeutic exercises  Plan of Care expires: 04/15/17  Plan of Care reviewed with: patient, daughter    PT ENCOURAGED TO CALL FOR ASSISTANCE WITH ALL NEEDS DUE TO FALL RISK STATUS, PT AGREEABLE.  PT ENCOURAGED TO INCREASE TIME OOB IN CHAIR, ALL MEALS IN CHAIR OOB, PT AGREEABLE    Genie Joy, PT  04/13/2017

## 2017-04-14 VITALS
HEIGHT: 64 IN | DIASTOLIC BLOOD PRESSURE: 56 MMHG | RESPIRATION RATE: 17 BRPM | OXYGEN SATURATION: 96 % | SYSTOLIC BLOOD PRESSURE: 114 MMHG | HEART RATE: 66 BPM | WEIGHT: 190 LBS | BODY MASS INDEX: 32.44 KG/M2 | TEMPERATURE: 98 F

## 2017-04-14 PROBLEM — R93.3 ABNORMAL CT SCAN, GASTROINTESTINAL TRACT: Status: RESOLVED | Noted: 2017-04-05 | Resolved: 2017-04-14

## 2017-04-14 PROBLEM — R10.30 LOWER ABDOMINAL PAIN: Status: RESOLVED | Noted: 2017-04-05 | Resolved: 2017-04-14

## 2017-04-14 PROBLEM — R07.9 CHEST PAIN: Status: RESOLVED | Noted: 2017-04-05 | Resolved: 2017-04-14

## 2017-04-14 LAB
ANION GAP SERPL CALC-SCNC: 11 MMOL/L
BUN SERPL-MCNC: 9 MG/DL
CALCIUM SERPL-MCNC: 8.8 MG/DL
CHLORIDE SERPL-SCNC: 102 MMOL/L
CO2 SERPL-SCNC: 28 MMOL/L
CREAT SERPL-MCNC: 0.9 MG/DL
EST. GFR  (AFRICAN AMERICAN): >60 ML/MIN/1.73 M^2
EST. GFR  (NON AFRICAN AMERICAN): >60 ML/MIN/1.73 M^2
GLUCOSE SERPL-MCNC: 89 MG/DL
POTASSIUM SERPL-SCNC: 3.7 MMOL/L
SODIUM SERPL-SCNC: 141 MMOL/L

## 2017-04-14 PROCEDURE — 25000003 PHARM REV CODE 250: Performed by: SURGERY

## 2017-04-14 PROCEDURE — 25000003 PHARM REV CODE 250: Performed by: INTERNAL MEDICINE

## 2017-04-14 PROCEDURE — 36415 COLL VENOUS BLD VENIPUNCTURE: CPT

## 2017-04-14 PROCEDURE — 97803 MED NUTRITION INDIV SUBSEQ: CPT

## 2017-04-14 PROCEDURE — 25000003 PHARM REV CODE 250: Performed by: NURSE PRACTITIONER

## 2017-04-14 PROCEDURE — 80048 BASIC METABOLIC PNL TOTAL CA: CPT

## 2017-04-14 PROCEDURE — 94761 N-INVAS EAR/PLS OXIMETRY MLT: CPT

## 2017-04-14 PROCEDURE — 96372 THER/PROPH/DIAG INJ SC/IM: CPT

## 2017-04-14 PROCEDURE — 97530 THERAPEUTIC ACTIVITIES: CPT

## 2017-04-14 PROCEDURE — 97116 GAIT TRAINING THERAPY: CPT

## 2017-04-14 PROCEDURE — 63600175 PHARM REV CODE 636 W HCPCS: Performed by: NURSE PRACTITIONER

## 2017-04-14 RX ORDER — ALPRAZOLAM 0.25 MG/1
0.25 TABLET ORAL 3 TIMES DAILY PRN
Qty: 30 TABLET | Refills: 0 | Status: SHIPPED | OUTPATIENT
Start: 2017-04-14 | End: 2017-04-27 | Stop reason: SDUPTHER

## 2017-04-14 RX ORDER — BISACODYL 5 MG
10 TABLET, DELAYED RELEASE (ENTERIC COATED) ORAL ONCE
Status: COMPLETED | OUTPATIENT
Start: 2017-04-14 | End: 2017-04-14

## 2017-04-14 RX ADMIN — ONDANSETRON 4 MG: 2 INJECTION INTRAMUSCULAR; INTRAVENOUS at 11:04

## 2017-04-14 RX ADMIN — ROSUVASTATIN CALCIUM 10 MG: 10 TABLET, FILM COATED ORAL at 09:04

## 2017-04-14 RX ADMIN — CLONIDINE HYDROCHLORIDE 0.1 MG: 0.1 TABLET ORAL at 01:04

## 2017-04-14 RX ADMIN — PANTOPRAZOLE SODIUM 40 MG: 40 TABLET, DELAYED RELEASE ORAL at 09:04

## 2017-04-14 RX ADMIN — HYDROCODONE BITARTRATE AND ACETAMINOPHEN 1 TABLET: 5; 325 TABLET ORAL at 11:04

## 2017-04-14 RX ADMIN — LEVOTHYROXINE SODIUM 75 MCG: 75 TABLET ORAL at 05:04

## 2017-04-14 RX ADMIN — CLOPIDOGREL BISULFATE 75 MG: 75 TABLET ORAL at 09:04

## 2017-04-14 RX ADMIN — BISACODYL 10 MG: 5 TABLET, COATED ORAL at 01:04

## 2017-04-14 RX ADMIN — GABAPENTIN 100 MG: 100 CAPSULE ORAL at 05:04

## 2017-04-14 RX ADMIN — HEPARIN SODIUM 5000 UNITS: 5000 INJECTION, SOLUTION INTRAVENOUS; SUBCUTANEOUS at 05:04

## 2017-04-14 RX ADMIN — HYDROCODONE BITARTRATE AND ACETAMINOPHEN 1 TABLET: 5; 325 TABLET ORAL at 01:04

## 2017-04-14 RX ADMIN — ONDANSETRON 4 MG: 2 INJECTION INTRAMUSCULAR; INTRAVENOUS at 01:04

## 2017-04-14 RX ADMIN — SODIUM CHLORIDE, SODIUM LACTATE, POTASSIUM CHLORIDE, AND CALCIUM CHLORIDE: .6; .31; .03; .02 INJECTION, SOLUTION INTRAVENOUS at 07:04

## 2017-04-14 RX ADMIN — DOCUSATE SODIUM 100 MG: 100 CAPSULE, LIQUID FILLED ORAL at 09:04

## 2017-04-14 RX ADMIN — GABAPENTIN 100 MG: 100 CAPSULE ORAL at 01:04

## 2017-04-14 RX ADMIN — METOPROLOL TARTRATE 25 MG: 25 TABLET ORAL at 09:04

## 2017-04-14 RX ADMIN — HYDROCODONE BITARTRATE AND ACETAMINOPHEN 1 TABLET: 5; 325 TABLET ORAL at 05:04

## 2017-04-14 RX ADMIN — LORAZEPAM 0.5 MG: 2 INJECTION, SOLUTION INTRAMUSCULAR; INTRAVENOUS at 11:04

## 2017-04-14 RX ADMIN — HEPARIN SODIUM 5000 UNITS: 5000 INJECTION, SOLUTION INTRAVENOUS; SUBCUTANEOUS at 01:04

## 2017-04-14 RX ADMIN — SERTRALINE HYDROCHLORIDE 100 MG: 50 TABLET ORAL at 09:04

## 2017-04-14 NOTE — PLAN OF CARE
Problem: Patient Care Overview  Goal: Plan of Care Review  Outcome: Ongoing (interventions implemented as appropriate)  LR at 50ml/hr.  Pt SR on monitor. Pain controlled throughout the shift. Pt had a few episodes of n/v at the beginning of shift. PRN Zofran given which helped.  BP elevated at beginning of the shift-pt given scheduled bp meds and later prn meds to lower bp. Tolerated well.  Staples clean, dry, intact and approximated to abdomen. No drainage noted. No distress noted.  Remained free of falls. Familty at bedside. Left pt bed low, call light in reach. Side rails up. Reminded pt to call for assistance.

## 2017-04-14 NOTE — PLAN OF CARE
Problem: Patient Care Overview  Goal: Plan of Care Review  Outcome: Ongoing (interventions implemented as appropriate)  PT REPORTS TO FEEL BETTER TODAY, NO DIZZINESS OR NAUSEA, IMPROVED GAIT DISTANCE

## 2017-04-14 NOTE — NURSING
Discharge instructions reviewed with pt, spouse, and daughter. All verbalized understanding in proper care for midline incision, follow up appt established, prescriptions given, diagnosis explained. No additional questions for now.

## 2017-04-14 NOTE — CONSULTS
[facility]   Adult Nutrition  Consult Note    SUMMARY     Recommendations  Recommendation/Intervention: 1. Continue to advance diet as tolerated to Light/GI Soft, encouraged smaller more frequent meals and avoidance of contrated sweets and high fat foods.    2. Patient refuses to drink an oral supplement due to aversion.   Goals: Intake of greater than 50% of meals  Nutrition Goal Status: new    Nutrition Discharge Plan  Home on Light/GI soft Cardiac diet    Reason for Assessment  Reason for Assessment: RD follow-up  Diagnosis:  (lower abdominal pain, malignant neoplasm of ileocecal valve)  Relevent Medical History: CAD, GERD, Gout, HLD, HTN, Gastric Bypass, Depression, see H&P   General Information Comments:   4/10/17 POD 3 for Right Hemicolectomy with jejunal resection. Patient experiencing N/V since surgery. Currently with BS active in all four quandrants, no flatus or BM yet.   4/14/17 POD 7, patient was advanced to Regular diet but experienced some N/V. She is currently on Full Liquids tolerating smaller amounts more frequently. Discussed diet with patient and importance of balanced adequate intake. Provided tips for increasing protein and overall calorie intake.    Nutrition Prescription Ordered  Current Diet Order: Full Liquid     Nutrition Risk Screen  Nutrition Risk Screen: no indicators present    Nutrition/Diet History  Typical Food/Fluid Intake: Patient with varying intake for many years following her gastric bypass. She reports having isolated days with N/V that affects intake. She maintaing 225lb for several years and about 2 years ago she reports weight loss down to 180-190 which she has maintainined over the last 1 year.    Labs/Tests/Procedures/Meds  Pertinent Labs Reviewed: reviewed  Pertinent Medications Reviewed: reviewed    Physical Findings  Overall Physical Appearance: obese  Oral/Mouth Cavity: tooth/teeth missing  Skin:  (incision, Andrez 18)    Anthropometrics  Height  (inches): 64.02 in  Weight Method: Stated  Weight (kg): 86.2 kg  Ideal Body Weight (IBW), Female: 120.1 lb  % Ideal Body Weight, Female (lb): 158.23 lb  BMI (kg/m2): 32.6  BMI Grade: 30 - 34.9- obesity - grade I    Estimated/Assessed Needs  Weight Used For Calorie Calculations: 86.2 kg (190 lb 0.6 oz)   Height (cm): 162.6 cm  Energy Need Method: [place]-St Jeor (x1.2 = 1610 calories)  Weight Used For Protein Calculations: 86.2 kg (190 lb 0.6 oz)  1.0 gm Protein (gm): 86.38  Fluid Need Method: RDA Method (1ml/karen or as needed)    Monitor and Evaluation  Food and Nutrient Intake: energy intake  Food and Nutrient Adminstration: diet order, enteral and parenteral nutrition administration  Anthropometric Measurements: weight  Biochemical Data, Medical Tests and Procedures: electrolyte and renal panel, glucose/endocrine profile  Nutrition-Focused Physical Findings: skin    Nutrition Follow-Up  RD Follow-up?: Yes (2x weekly)    Assessment and Plan  Malignant neoplasm of ileocecal valve  Nutrition Problem:   Inadequate energy intake    Etiology/Related to:   Altered GI tract function    As evidenced by:  Malignant neoplasm, s/p R hemicolectomy, NPO greater than 5 days    Treatment Strategy:   1. Advance to oral diet  2. If unable to advance within 72 hours, consider IV nutrition support    Nutrition Diagnosis Status:   New

## 2017-04-14 NOTE — DISCHARGE SUMMARY
"[facility] Hospital Medicine  Discharge Summary      Patient Name: Violet Swenson  MRN: [id number]  Admission Date: 4/5/2017  Hospital Length of Stay: 9 days  Discharge Date and Time: No discharge date for patient encounter.  Attending Physician: Nikki Brewer MD   Discharging Provider: Nikki Brewer MD  Primary Care Provider: Marti Coronel MD      HPI:   Violet Swenson is a 77 year old female with a PMHx of Hypothyroidism, HTN, HLD, GERD, Depression, GERD, Depression, CAD, Gastric bypass '93, PPM, who presented to the ER with c/o generalized abdominal pain for 2 days. Pain describes as "cramping." Associated symptoms included: nausea, vomiting, and chest pain.  ED workup revealed:  WBC 13.25, glucose 119, lipase 351, amylase 189, troponin 0.179. CT abdomen/pelvis with soft tissue mass in the RUQ, recommend endoscopy with biopsy. CXR negative. ED discussed case with Cardiology.     Procedure(s) (LRB):  EXPLORATORY-LAPAROTOMY (N/A)  COLECTOMY-PARTIAL (N/A)  LYSIS-ADHESION (N/A)      Indwelling Lines/Drains at time of discharge:   Lines/Drains/Airways          No matching active lines, drains, or airways        Hospital Course:   Colonoscopy completed with biopsy of mass in the terminal ileum. General surgery was consulted. Awaiting pathology results. 4/7/17 right gisele-colectomy with terminal ileum and jejunal resection.PCA pump discontinued. Echo showed EF 40%. Discussed with Dr. Shah, General Surgery.   Patient had several bowel movements and was tolerated PO intake without nausea and vomiting .  Seen and examined nd stable for discharge        Consults:   Consults         Status Ordering Provider     Consult to Case Management/Social Work  Once     Provider:  (Not yet assigned)    LAURO Stubbs     Inpatient consult to Cardiology  Once     Provider:  (Not yet assigned)    Completed THELMA GAMBLE     Inpatient consult to Gastroenterology  Once   "   Provider:  (Not yet assigned)    Completed MESSI WATKINS     Inpatient consult to General Surgery  Once     Provider:  Orlando Moulton MD    Completed HAYLEE ARANA     Inpatient consult to Hospitalist  Once     Provider:  (Not yet assigned)    THELMA Walker     Inpatient consult to Social Work  Once     Provider:  (Not yet assigned)    DUTCH Tejada          Significant Diagnostic Studies:     Pending Diagnostic Studies:     None        Final Active Diagnoses:    Diagnosis Date Noted POA    Malignant neoplasm of ileocecal valve [C18.0] 04/08/2017 Yes    Ischemic cardiomyopathy [I25.5] 04/08/2017 Yes    Essential hypertension [I10] 02/01/2016 Yes     Chronic    Hyperlipidemia [E78.5] 02/01/2016 Yes     Chronic    Hypothyroidism (acquired) [E03.9] 02/01/2016 Yes     Chronic    CAD, multiple vessel [I25.10] 02/01/2016 Yes     Chronic      Problems Resolved During this Admission:    Diagnosis Date Noted Date Resolved POA    PRINCIPAL PROBLEM:  Lower abdominal pain [R10.30] 04/05/2017 04/14/2017 Yes    Chest pain [R07.9] 04/05/2017 04/14/2017 Yes    Abnormal CT scan, gastrointestinal tract [R93.3] 04/05/2017 04/14/2017 Yes      Discharged Condition: good    Disposition: Home or Self Care    Follow Up:  Follow-up Information     Follow up with [facility].    Specialty:  Home Health Services    Why:  Home Health:  SN, PT, OT    Contact information:    [address] [facility] [facility] [address]  [place] [address]  [phone]          Follow up with Orlando Moulton MD In 2 weeks.    Specialty:  General Surgery    Contact information:    [id number] [place] [address] [address] [place] [address]  [phone]          Follow up with [facility]    Specialty:  DYLAN Provider    Why:  DME:  Bedside commode    Contact information:    [address] [facility] [address]  [facility] [id number] [phone]          Follow up with Marti Coronel MD.    Specialty:  Family Medicine     "Contact information:    [address] [place] [address] [address] [place] [address]  [phone]          Follow up with Orlando Moulton MD.    Specialty:  General Surgery    Contact information:    [address] [place] [address] [address] [place] [address]  [phone]          Patient Instructions:     COMMODE FOR HOME USE   Order Specific Question Answer Comments   Type: Standard    Height: 5' 4" (1.626 m)    Weight: 86.2 kg (190 lb)    Does patient have medical equipment at home? cane, straight    Does patient have medical equipment at home? walker, rolling    Length of need (1-99 months): 99      Diet general     Activity as tolerated       Medications:  Reconciled Home Medications:   Current Discharge Medication List      START taking these medications    Details   alprazolam (XANAX) 0.25 MG tablet Take 1 tablet (0.25 mg total) by mouth 3 (three) times daily as needed for Anxiety.  Qty: 30 tablet, Refills: 0      docusate sodium (COLACE) 100 MG capsule Take 1 capsule (100 mg total) by mouth 2 (two) times daily as needed for Constipation.  Qty: 30 capsule, Refills: 0      hydrocodone-acetaminophen 5-325mg (NORCO) 5-325 mg per tablet Take 1 tablet by mouth every 4 (four) hours as needed.  Qty: 30 tablet, Refills: 0      ondansetron (ZOFRAN-ODT) 8 MG TbDL Take 1 tablet (8 mg total) by mouth every 8 (eight) hours as needed (Nausea).  Qty: 10 tablet, Refills: 0         CONTINUE these medications which have NOT CHANGED    Details   allopurinol (ZYLOPRIM) 100 MG tablet TAKE 2 TABLET BY MOUTH DAILY  Qty: 180 tablet, Refills: 0    Associated Diagnoses: Idiopathic chronic gout of multiple sites without tophus      aspirin (ECOTRIN) 81 MG EC tablet Take 81 mg by mouth once daily.       baicalin-catechin 500 mg Cap Take 500 mg by mouth 2 (two) times daily.  Qty: 60 each, Refills: 5    Associated Diagnoses: Primary osteoarthritis involving multiple joints; NSAID long-term use      clopidogrel (PLAVIX) 75 mg tablet Take 1 tablet (75 mg total) by " mouth once daily.  Qty: 90 tablet, Refills: 3    Associated Diagnoses: Coronary artery disease without angina pectoris, unspecified vessel or lesion type, unspecified whether native or transplanted heart      COLCRYS 0.6 mg tablet TAKE 1 TABLET(0.6 MG) BY MOUTH EVERY DAY  Qty: 30 tablet, Refills: 3    Associated Diagnoses: Idiopathic chronic gout of multiple sites without tophus      ergocalciferol, vitamin D2, 400 unit Tab Take by mouth daily.      fluticasone (FLONASE) 50 mcg/actuation nasal spray 2 sprays by Each Nare route once daily.      gabapentin (NEURONTIN) 100 MG capsule Take 100 mg by mouth 3 (three) times daily.      hydrochlorothiazide (HYDRODIURIL) 12.5 MG Tab TAKE 1 TABLET BY MOUTH ONCE DAILY  Qty: 90 tablet, Refills: 1    Comments: **Patient requests 90 days supply**      isosorbide mononitrate (IMDUR) 30 MG 24 hr tablet TAKE 1 TABLET BY MOUTH EVERY DAY  Qty: 30 tablet, Refills: 6    Associated Diagnoses: Coronary artery disease involving native coronary artery of native heart with unstable angina pectoris      levothyroxine (SYNTHROID) 75 MCG tablet TAKE 1 TABLET(75 MCG) BY MOUTH BEFORE BREAKFAST  Qty: 90 tablet, Refills: 1    Associated Diagnoses: Hypothyroidism (acquired)      meloxicam (MOBIC) 7.5 MG tablet TAKE 1 TABLET BY MOUTH ONCE DAILY AS NEEDED FOR PAIN  Qty: 90 tablet, Refills: 0    Associated Diagnoses: Primary osteoarthritis involving multiple joints      rosuvastatin (CRESTOR) 10 MG tablet Take 1 tablet (10 mg total) by mouth once daily.  Qty: 30 tablet, Refills: 6    Associated Diagnoses: Hyperlipidemia, unspecified hyperlipidemia type      sertraline (ZOLOFT) 100 MG tablet Take 1 tablet (100 mg total) by mouth once daily.  Qty: 90 tablet, Refills: 3    Comments: **Patient requests 90 days supply**  Associated Diagnoses: Major depression, chronic; Situational anxiety; Insomnia secondary to anxiety      turmeric root extract 500 mg Cap Take 500 mg by mouth 2 (two) times daily.       verapamil (CALAN-SR) 120 MG CR tablet TAKE 1/2 TABLET BY MOUTH NIGHTLY  Qty: 30 tablet, Refills: 6    Associated Diagnoses: Essential hypertension      ZINC ACETATE ORAL 250 mg.      nitroglycerin 400 mcg/spray SprA Place onto the tongue.      ranitidine (ZANTAC) 150 MG tablet Take 150 mg by mouth nightly.         STOP taking these medications       multivitamin capsule Comments:   Reason for Stopping:             Time spent on the discharge of patient: 40 minutes    Nikki Brewer MD  Department of Hospital Medicine  [facility]

## 2017-04-14 NOTE — ASSESSMENT & PLAN NOTE
Partially obstructing mass of terminal ileum  Patient underwent a right hemicolectomy and jejunal resection for a presumed malignancy  Post op day 7  Liquids as tolerated  Pain is  controlled  Physical therapy        Kub - eval bowel gas pattern

## 2017-04-14 NOTE — PROGRESS NOTES
[facility]   General Surgery  Progress Note    Subjective:     History of Present Illness:  76 y/o female referred for partially obstructing mass of the terminal ileum/cecum. She presented with a few day history of worsening right lower quadrant abdominal pain, nausea/emesis. She underwent CT scan concerning for ileocecal mass and subsequent colonoscopy showed partially obstructing mass of the terminal ileum.  She has 30lb weight loss in recent months, denies any fever/chills, diarrhea, constipation. She currently reports crampy abdominal pain in the RLQ. Previously had gastric bypass, cholecystectomy, appendectomy.    Post-Op Info:  Procedure(s) (LRB):  EXPLORATORY-LAPAROTOMY (N/A)  COLECTOMY-PARTIAL (N/A)  LYSIS-ADHESION (N/A)   7 Days Post-Op     Interval History:  nausea with emesis yesterday/nausea improving this am, continuing to pass flatus/no further BM's since yesterday morning, pain controlled, ambulating    Medications:  Continuous Infusions:   lactated ringers 50 mL/hr at 04/14/17 0727     Scheduled Meds:   clopidogrel  75 mg Oral Daily    docusate sodium  100 mg Oral BID    gabapentin  100 mg Oral TID    heparin (porcine)  5,000 Units Subcutaneous Q8H    levothyroxine  75 mcg Oral Before breakfast    metoprolol tartrate  25 mg Oral BID    pantoprazole  40 mg Oral Daily    rosuvastatin  10 mg Oral Daily    sertraline  100 mg Oral Daily    verapamil  120 mg Oral Nightly     PRN Meds:cloNIDine, diphenhydrAMINE, hydrALAZINE, hydrocodone-acetaminophen 5-325mg, lorazepam, morphine, ondansetron, promethazine (PHENERGAN) IVPB, simethicone     Review of patient's allergies indicates:   Allergen Reactions    Corticosteroids (glucocorticoids) Nausea Only and Other (See Comments)     Stomach pain, dizziness, headache    Oxycodone Other (See Comments)     Blood pressure dropped     Objective:     Vital Signs (Most Recent):  Temp: 98 °F (36.7 °C) (04/14/17 0722)  Pulse: 65 (04/14/17  0722)  Resp: 18 (04/14/17 0722)  BP: 114/60 (04/14/17 0722)  SpO2: 95 % (04/14/17 0722) Vital Signs (24h Range):  Temp:  [97.7 °F (36.5 °C)-98.3 °F (36.8 °C)] 98 °F (36.7 °C)  Pulse:  [65-84] 65  Resp:  [18-19] 18  SpO2:  [90 %-97 %] 95 %  BP: (114-172)/(60-94) 114/60     Weight: 86.2 kg (190 lb)  Body mass index is 32.61 kg/(m^2).    Intake/Output - Last 3 Shifts       04/12 0700 - 04/13 0659 04/13 0700 - 04/14 0659 04/14 0700 - 04/15 0659    P.O. 450 300 120    I.V. (mL/kg)  425 (4.9) 372.5 (4.3)    Total Intake(mL/kg) 450 (5.2) 725 (8.4) 492.5 (5.7)    Urine (mL/kg/hr) 1000 (0.5) 200 (0.1)     Stool 0 (0)      Total Output 1000 200      Net -550 +525 +492.5           Urine Occurrence 1 x 6 x     Stool Occurrence 1 x 0 x           Physical Exam   Constitutional: She appears well-developed and well-nourished. No distress.   Cardiovascular: Normal rate and regular rhythm.    Pulmonary/Chest: Effort normal.   Abdominal: Soft. She exhibits no distension. Tenderness: attp.   Incision c,d,i   Vitals reviewed.      Significant Labs:  CBC:     Recent Labs  Lab 04/12/17  0920   WBC 10.39   RBC 3.69*   HGB 10.2*   HCT 32.4*   *   MCV 88   MCH 27.6   MCHC 31.5*     CMP:     Recent Labs  Lab 04/14/17  0552   GLU 89   CALCIUM 8.8      K 3.7   CO2 28      BUN 9   CREATININE 0.9           Assessment/Plan:     Essential hypertension  Home medications for blood pressure control    Hyperlipidemia  Home medications once a diet as tolerated    Hypothyroidism (acquired)  Continue Synthroid  orally    Malignant neoplasm of ileocecal valve  Partially obstructing mass of terminal ileum  Patient underwent a right hemicolectomy and jejunal resection for a presumed malignancy  Post op day 7  Liquids as tolerated  Pain is  controlled  Physical therapy        Kub - eval bowel gas pattern        Orlando Moulton MD  General Surgery  [facility]    Addendum:  KUJOSH reviewed colonic distention  Dulcolax once  today  Can advance diet as tolerated once tolerating po

## 2017-04-14 NOTE — PT/OT/SLP PROGRESS
Physical Therapy  Treatment    Violet Swenson   MRN: [id number]   Admitting Diagnosis: Lower abdominal pain    PT Received On: 17  PT Start Time: 0950     PT Stop Time: 1015    PT Total Time (min): 25 min       Billable Minutes:  Gait [id number] and Therapeutic Activity 10    Treatment Type: Treatment  PT/PTA: PT      General Precautions: Standard, fall  Orthopedic Precautions: N/A   Braces: N/A    Subjective:  Communicated with NURSE HENLEY prior to session.  Pain Ratin/10  Location: abdomen    Objective:   Patient found with: telemetry    Functional Mobility:  Bed Mobility:   Rolling/Turning to Left: Stand by assistance  Rolling/Turning Right: Stand by assistance  Scooting/Bridging: Stand by Assistance  Supine to Sit: Stand by Assistance    Transfers:  Sit <> Stand Assistance: Stand By Assistance  Sit <> Stand Assistive Device: Rolling Walker  Bed <> Chair Technique: Stand Pivot  Bed <> Chair Assistance: Stand By Assistance  Bed <> Chair Assistive Device: Rolling Walker  Toilet Transfer Assistance: Stand By Assistance (NO ASSIST FOR CLEANING AFTER TOILETING. PT STOOD AT SINK TO WASH/DRY HANDS, TO BRUSH TEETH AND WASH FACE)  Toilet Transfer Assistive Device: No Assistive Device  Car Transfer Technique: Stand Pivot    Gait:   Gait Distance: PT ' WITH RW AND CG/SBA, SLOW PACED STEADY GAIT, PT C/O FATIGUE SO FREQUENT SMALL STANDING REST BREAKS, NO C/O DIZZINESS OR NAUSEA, NO LOB OR SOB ON ROOM AIR  Assistance 1: Contact Guard Assistance  Gait Assistive Device: Rolling walker  Gait Pattern: swing-through gait  Gait Deviation(s): decreased joseph    Balance:   Static Sit: GOOD  Dynamic Sit: GOOD  Static Stand: GOOD  Dynamic stand:  FAIR+    Therapeutic Activities and Exercises:  REVIEWED TEJA LIPSCOMB WITH PT AND FAMILY TO PERFORM WHILE SEATED IN CHAIR, PT HALLE PERRIN WITH SET UP    Patient left up in chair with all lines intact, call button in reach, NURSE notified and DAUGHTER  present.    Assessment:  Violet Swenson is a 77 y.o. female with a medical diagnosis of Lower abdominal pain   PT WILL BENEFIT FROM CONT. SKILLED P.T. TO ADDRESS IMPAIRMENTS    Rehab identified problem list/impairments: Rehab identified problem list/impairments: weakness, impaired endurance, impaired functional mobilty, gait instability, impaired balance, decreased safety awareness, impaired coordination    Rehab potential is good.    Activity tolerance: Good    Discharge recommendations: Discharge Facility/Level Of Care Needs: home health PT     Barriers to discharge: Barriers to Discharge: None    Equipment recommendations: Equipment Needed After Discharge: bedside commode, bath bench     GOALS:   Physical Therapy Goals        Problem: Physical Therapy Goal    Goal Priority Disciplines Outcome Goal Variances Interventions   Physical Therapy Goal     PT/OT, PT      Description:  PT eval complete.  Pt will be able to perform the following in 7 days  1.  Pt will be IND with bed mobility  2.  Pt will transfer bed to chair IND  3.  Pt will ambulate 150 feet without AD with SPV/IND            PLAN:    Patient to be seen 5 x/week  to address the above listed problems via gait training, therapeutic activities, therapeutic exercises  Plan of Care expires: 04/15/17  Plan of Care reviewed with: patient, daughter    PT ENCOURAGED TO CALL FOR ASSISTANCE WITH ALL NEEDS DUE TO FALL RISK STATUS, PT AGREEABLE.  PT ENCOURAGED TO INCREASE TIME OOB IN CHAIR, ALL MEALS IN CHAIR OOB, PT AGREEABLE    Genie Joy, PT  04/14/2017

## 2017-04-14 NOTE — NURSING
Contacted patient about xanax prescription. Pt's spouse will come back tomorrow to have it signed. Dr Osman mcdonnell.

## 2017-04-14 NOTE — SUBJECTIVE & OBJECTIVE
Interval History:  nausea with emesis yesterday/nausea improving this am, continuing to pass flatus/no further BM's since yesterday morning, pain controlled, ambulating    Medications:  Continuous Infusions:   lactated ringers 50 mL/hr at 04/14/17 0727     Scheduled Meds:   clopidogrel  75 mg Oral Daily    docusate sodium  100 mg Oral BID    gabapentin  100 mg Oral TID    heparin (porcine)  5,000 Units Subcutaneous Q8H    levothyroxine  75 mcg Oral Before breakfast    metoprolol tartrate  25 mg Oral BID    pantoprazole  40 mg Oral Daily    rosuvastatin  10 mg Oral Daily    sertraline  100 mg Oral Daily    verapamil  120 mg Oral Nightly     PRN Meds:cloNIDine, diphenhydrAMINE, hydrALAZINE, hydrocodone-acetaminophen 5-325mg, lorazepam, morphine, ondansetron, promethazine (PHENERGAN) IVPB, simethicone     Review of patient's allergies indicates:   Allergen Reactions    Corticosteroids (glucocorticoids) Nausea Only and Other (See Comments)     Stomach pain, dizziness, headache    Oxycodone Other (See Comments)     Blood pressure dropped     Objective:     Vital Signs (Most Recent):  Temp: 98 °F (36.7 °C) (04/14/17 0722)  Pulse: 65 (04/14/17 0722)  Resp: 18 (04/14/17 0722)  BP: 114/60 (04/14/17 0722)  SpO2: 95 % (04/14/17 0722) Vital Signs (24h Range):  Temp:  [97.7 °F (36.5 °C)-98.3 °F (36.8 °C)] 98 °F (36.7 °C)  Pulse:  [65-84] 65  Resp:  [18-19] 18  SpO2:  [90 %-97 %] 95 %  BP: (114-172)/(60-94) 114/60     Weight: 86.2 kg (190 lb)  Body mass index is 32.61 kg/(m^2).    Intake/Output - Last 3 Shifts       04/12 0700 - 04/13 0659 04/13 0700 - 04/14 0659 04/14 0700 - 04/15 0659    P.O. 450 300 120    I.V. (mL/kg)  425 (4.9) 372.5 (4.3)    Total Intake(mL/kg) 450 (5.2) 725 (8.4) 492.5 (5.7)    Urine (mL/kg/hr) 1000 (0.5) 200 (0.1)     Stool 0 (0)      Total Output 1000 200      Net -550 +525 +492.5           Urine Occurrence 1 x 6 x     Stool Occurrence 1 x 0 x           Physical Exam   Constitutional: She  appears well-developed and well-nourished. No distress.   Cardiovascular: Normal rate and regular rhythm.    Pulmonary/Chest: Effort normal.   Abdominal: Soft. She exhibits no distension. Tenderness: attp.   Incision c,d,i   Vitals reviewed.      Significant Labs:  CBC:     Recent Labs  Lab 04/12/17  0920   WBC 10.39   RBC 3.69*   HGB 10.2*   HCT 32.4*   *   MCV 88   MCH 27.6   MCHC 31.5*     CMP:     Recent Labs  Lab 04/14/17  0552   GLU 89   CALCIUM 8.8      K 3.7   CO2 28      BUN 9   CREATININE 0.9

## 2017-04-15 NOTE — PROGRESS NOTES
Post discharge f/u:  Patient discharged on 4/14/2017 with orders for HH.  SW contacted [facility] to advise of HH referral and d/c on 4/14/2017.  Demographics, Ambulatory Referral to [facility], and Discharge summary [facility]    Justine Chambers LMSW, SO-NILO, CCM

## 2017-04-21 ENCOUNTER — TELEPHONE (OUTPATIENT)
Dept: SURGERY | Facility: CLINIC | Age: 77
End: 2017-04-21

## 2017-04-21 ENCOUNTER — TELEPHONE (OUTPATIENT)
Dept: INTERNAL MEDICINE | Facility: CLINIC | Age: 77
End: 2017-04-21

## 2017-04-21 NOTE — TELEPHONE ENCOUNTER
S/W patient via phone in rg to appointment with hemat/onc provider per Dr. Moulton. During conversation appointment was scheduled for 4/26/17/ONDANI @ 10:00 AM with Dr. Matson.  Patient verbalized understanding

## 2017-04-21 NOTE — TELEPHONE ENCOUNTER
----- Message from Jaquelin Mendez sent at 4/21/2017  9:57 AM CDT -----  [facility] [phone],,, pt had surgery on the 8th,,, pt is been complaining of a cough since surgery, and she also green/yellow mucus coming out, and pt lungs sound clear,,, please call to advise

## 2017-04-21 NOTE — TELEPHONE ENCOUNTER
Spoke with Cee with [facility]. Patient has been advised to seek care in UC or be seen by a provider as this may not be treated over the phone.      Patient and HH was also concerned about not having a treatment plan for cancer diagnosis. I spoke with Emilia in surgery and she will contact Dr. Moulton and advise patient of plan. She does have an appointment for follow up with Dr. Moulton in Wednesday of next week.

## 2017-04-23 ENCOUNTER — OFFICE VISIT (OUTPATIENT)
Dept: URGENT CARE | Facility: CLINIC | Age: 77
End: 2017-04-23
Payer: MEDICARE

## 2017-04-23 ENCOUNTER — HOSPITAL ENCOUNTER (OUTPATIENT)
Dept: RADIOLOGY | Facility: HOSPITAL | Age: 77
Discharge: HOME | End: 2017-04-23
Attending: NURSE PRACTITIONER
Payer: MEDICARE

## 2017-04-23 VITALS
DIASTOLIC BLOOD PRESSURE: 78 MMHG | SYSTOLIC BLOOD PRESSURE: 130 MMHG | HEART RATE: 78 BPM | BODY MASS INDEX: 30.14 KG/M2 | TEMPERATURE: 98 F | RESPIRATION RATE: 20 BRPM | OXYGEN SATURATION: 95 % | WEIGHT: 175.63 LBS

## 2017-04-23 DIAGNOSIS — J20.9 ACUTE BRONCHITIS, UNSPECIFIED ORGANISM: ICD-10-CM

## 2017-04-23 DIAGNOSIS — R06.02 SOB (SHORTNESS OF BREATH): ICD-10-CM

## 2017-04-23 DIAGNOSIS — J20.9 ACUTE BRONCHITIS, UNSPECIFIED ORGANISM: Primary | ICD-10-CM

## 2017-04-23 DIAGNOSIS — R53.83 FATIGUE, UNSPECIFIED TYPE: ICD-10-CM

## 2017-04-23 DIAGNOSIS — I10 ESSENTIAL HYPERTENSION: ICD-10-CM

## 2017-04-23 PROCEDURE — 71020 XR CHEST PA AND LATERAL: CPT | Mod: 26,,, | Performed by: RADIOLOGY

## 2017-04-23 PROCEDURE — 1159F MED LIST DOCD IN RCRD: CPT | Mod: S$GLB,,, | Performed by: NURSE PRACTITIONER

## 2017-04-23 PROCEDURE — 99999 PR PBB SHADOW E&M-EST. PATIENT-LVL V: CPT | Mod: PBBFAC,,, | Performed by: NURSE PRACTITIONER

## 2017-04-23 PROCEDURE — 3075F SYST BP GE 130 - 139MM HG: CPT | Mod: S$GLB,,, | Performed by: NURSE PRACTITIONER

## 2017-04-23 PROCEDURE — 99499 UNLISTED E&M SERVICE: CPT | Mod: S$GLB,,, | Performed by: NURSE PRACTITIONER

## 2017-04-23 PROCEDURE — 1160F RVW MEDS BY RX/DR IN RCRD: CPT | Mod: S$GLB,,, | Performed by: NURSE PRACTITIONER

## 2017-04-23 PROCEDURE — 1125F AMNT PAIN NOTED PAIN PRSNT: CPT | Mod: S$GLB,,, | Performed by: NURSE PRACTITIONER

## 2017-04-23 PROCEDURE — 99213 OFFICE O/P EST LOW 20 MIN: CPT | Mod: 25,S$GLB,, | Performed by: NURSE PRACTITIONER

## 2017-04-23 PROCEDURE — 3078F DIAST BP <80 MM HG: CPT | Mod: S$GLB,,, | Performed by: NURSE PRACTITIONER

## 2017-04-23 PROCEDURE — 71020 XR CHEST PA AND LATERAL: CPT | Mod: TC,PO

## 2017-04-23 RX ORDER — ALBUTEROL SULFATE 90 UG/1
1-2 AEROSOL, METERED RESPIRATORY (INHALATION) EVERY 6 HOURS PRN
Qty: 18 G | Refills: 0 | Status: SHIPPED | OUTPATIENT
Start: 2017-04-23 | End: 2018-01-10

## 2017-04-23 RX ORDER — BENZONATATE 100 MG/1
200 CAPSULE ORAL 3 TIMES DAILY PRN
Qty: 60 CAPSULE | Refills: 1 | Status: SHIPPED | OUTPATIENT
Start: 2017-04-23 | End: 2017-05-03

## 2017-04-23 RX ORDER — LEVALBUTEROL 1.25 MG/.5ML
1.25 SOLUTION, CONCENTRATE RESPIRATORY (INHALATION)
Status: COMPLETED | OUTPATIENT
Start: 2017-04-23 | End: 2017-04-23

## 2017-04-23 RX ORDER — DOXYCYCLINE 100 MG/1
100 CAPSULE ORAL 2 TIMES DAILY
Qty: 14 CAPSULE | Refills: 0 | Status: SHIPPED | OUTPATIENT
Start: 2017-04-23 | End: 2017-04-30

## 2017-04-23 RX ADMIN — LEVALBUTEROL 1.25 MG: 1.25 SOLUTION, CONCENTRATE RESPIRATORY (INHALATION) at 09:04

## 2017-04-23 NOTE — MR AVS SNAPSHOT
[facility]  [address]  [facility] [address]  Phone: [phone]  Fax: [phone]                  Violet Swenson   2017 9:10 AM   Office Visit    Description:  Female : 1940   Provider:  Kyara Herring NP   Department:  [facility]           Reason for Visit     Cough     Bronchitis           Diagnoses this Visit        Comments    Acute bronchitis, unspecified organism    -  Primary     Fatigue, unspecified type         SOB (shortness of breath)                To Do List           Future Appointments        Provider Department Dept Phone    2017 9:20 AM Orlando Moulton MD [facility] General Surgery [phone]    2017 10:00 AM Da Matson MD [facility] Hematology Oncology [phone]    2017 11:00 AM Marti Coronel MD [facility] Internal Medicine [phone]    2017 1:00 PM [facility], ON ARRHYTHMIA [facility] Arrhythmia Procedures [phone]    2017 8:20 AM Jackelin Layton NP [facility] Internal Medicine [phone]      Goals (5 Years of Data)     None       These Medications        Disp Refills Start End    doxycycline (MONODOX) 100 MG capsule 14 capsule 0 2017    Take 1 capsule (100 mg total) by mouth 2 (two) times daily. - Oral    [facility] [address] [address] [place] [address] #: [phone]       albuterol 90 mcg/actuation inhaler 18 g 0 2017     Inhale 1-2 puffs into the lungs every 6 (six) hours as needed for Wheezing. Rescue - Inhalation    [facility] [address] [address] [place] [address] #: [phone]       benzonatate (TESSALON) 100 MG capsule 60 capsule 1 2017 5/3/2017    Take 2 capsules (200 mg total) by mouth 3 (three) times daily as needed. - Oral    Pharmacy: [facility] [address] [place], [place] - [address] AT  [place] [facility] #: [phone]         [facility]     [facility] Care [facility] - 24/7 Assistance  Unless otherwise directed by your provider, please contact [facility], our nurse care line that is available for 24/7 assistance.     Registered nurses in the [facility] provide: appointment scheduling, clinical advisement, health education, and other advisory services.  Call: [phone] (toll free)               Medications           Message regarding Medications     Verify the changes and/or additions to your medication regime listed below are the same as discussed with your clinician today.  If any of these changes or additions are incorrect, please notify your healthcare provider.        START taking these NEW medications        Refills    doxycycline (MONODOX) 100 MG capsule 0    Sig: Take 1 capsule (100 mg total) by mouth 2 (two) times daily.    Class: Normal    Route: Oral    albuterol 90 mcg/actuation inhaler 0    Sig: Inhale 1-2 puffs into the lungs every 6 (six) hours as needed for Wheezing. Rescue    Class: Normal    Route: Inhalation    benzonatate (TESSALON) 100 MG capsule 1    Sig: Take 2 capsules (200 mg total) by mouth 3 (three) times daily as needed.    Class: Normal    Route: Oral      These medications were administered today        Dose Freq    levalbuterol nebulizer solution 1.25 mg 1.25 mg [facility] 1 time    Sig: Take 0.5 mLs (1.25 mg total) by nebulization one time.    Class: Normal    Route: Nebulization      STOP taking these medications     baicalin-catechin 500 mg Cap Take 500 mg by mouth 2 (two) times daily.           Verify that the below list of medications is an accurate representation of the medications you are currently taking.  If none reported, the list may be blank. If incorrect, please contact your healthcare provider. Carry this list with you in case of emergency.           Current Medications     allopurinol (ZYLOPRIM) 100 MG tablet TAKE 2  TABLET BY MOUTH DAILY    alprazolam (XANAX) 0.25 MG tablet Take 1 tablet (0.25 mg total) by mouth 3 (three) times daily as needed for Anxiety.    aspirin (ECOTRIN) 81 MG EC tablet Take 81 mg by mouth once daily.     clopidogrel (PLAVIX) 75 mg tablet Take 1 tablet (75 mg total) by mouth once daily.    COLCRYS 0.6 mg tablet TAKE 1 TABLET(0.6 MG) BY MOUTH EVERY DAY    docusate sodium (COLACE) 100 MG capsule Take 1 capsule (100 mg total) by mouth 2 (two) times daily as needed for Constipation.    gabapentin (NEURONTIN) 100 MG capsule Take 100 mg by mouth 3 (three) times daily.    hydrochlorothiazide (HYDRODIURIL) 12.5 MG Tab TAKE 1 TABLET BY MOUTH ONCE DAILY    hydrocodone-acetaminophen 5-325mg (NORCO) 5-325 mg per tablet Take 1 tablet by mouth every 4 (four) hours as needed.    isosorbide mononitrate (IMDUR) 30 MG 24 hr tablet TAKE 1 TABLET BY MOUTH EVERY DAY    meloxicam (MOBIC) 7.5 MG tablet TAKE 1 TABLET BY MOUTH ONCE DAILY AS NEEDED FOR PAIN    ondansetron (ZOFRAN-ODT) 8 MG TbDL Take 1 tablet (8 mg total) by mouth every 8 (eight) hours as needed (Nausea).    rosuvastatin (CRESTOR) 10 MG tablet Take 1 tablet (10 mg total) by mouth once daily.    sertraline (ZOLOFT) 100 MG tablet Take 1 tablet (100 mg total) by mouth once daily.    verapamil (CALAN-SR) 120 MG CR tablet TAKE 1/2 TABLET BY MOUTH NIGHTLY    albuterol 90 mcg/actuation inhaler Inhale 1-2 puffs into the lungs every 6 (six) hours as needed for Wheezing. Rescue    benzonatate (TESSALON) 100 MG capsule Take 2 capsules (200 mg total) by mouth 3 (three) times daily as needed.    doxycycline (MONODOX) 100 MG capsule Take 1 capsule (100 mg total) by mouth 2 (two) times daily.    ergocalciferol, vitamin D2, 400 unit Tab Take by mouth daily.    fluticasone (FLONASE) 50 mcg/actuation nasal spray 2 sprays by Each Nare route once daily.    levothyroxine (SYNTHROID) 75 MCG tablet TAKE 1 TABLET(75 MCG) BY MOUTH BEFORE BREAKFAST    nitroglycerin 400 mcg/spray SprA Place  onto the tongue.    ranitidine (ZANTAC) 150 MG tablet Take 150 mg by mouth nightly.    turmeric root extract 500 mg Cap Take 500 mg by mouth 2 (two) times daily.    ZINC ACETATE ORAL 250 mg.           Clinical Reference Information           Your Vitals Were     BP Pulse Temp Resp    130/78 (BP Location: Left arm, Patient Position: Sitting, BP Method: Manual) 78 98.3 °F (36.8 °C) (Tympanic) 20    Weight SpO2 BMI    79.6 kg (175 lb 9.5 oz) 95% 30.14 kg/m2      Blood Pressure          Most Recent Value    BP  130/78      Allergies as of 4/23/2017     Corticosteroids (Glucocorticoids)    Oxycodone      Immunizations Administered on Date of Encounter - 4/23/2017     None      Orders Placed During Today's Visit     Future Labs/Procedures Expected by Expires    X-Ray Chest PA And Lateral  4/23/2017 4/23/2018      Administrations This Visit     levalbuterol nebulizer solution 1.25 mg     Admin Date Action Dose Route Administered By             04/23/2017 Given 1.25 mg Nebulization Jackelin Cardona LPN                      [facility] Sign-Up     Activating your [facility] account is as easy as 1-2-3!     1) Visit [email or web address], select Sign Up Now, enter this activation code and your date of birth, then select Next.  Activation code not generated  Current Patient Portal Status: Account disabled      2) Create a username and password to use when you visit [facility] in the future and select a security question in case you lose your password and select Next.    3) Enter your e-mail address and click Sign Up!    Additional Information  If you have questions, please e-mail [email or web address] or call [phone] to talk to our [facility] staff. Remember, [facility] is NOT to be used for urgent needs. For medical emergencies, dial 911.         Instructions    PLAN: CXR,   Advised increased p.o. fluids  Nebulizer with Xopenex 1.25 for 15 and clinic now x 1  Drink plenty of clear fluids--at least 64 ounces of water/juice &  rest  Simlply saline nasal wash to irrigate sinuses and for congestion/runny nose.  Meds: Doxycycline, albuterol inhaler & Tessalon Perles  Practice good handwashing..  Mucinex for cough and chest congestion.  Tylenol  for fever, headache and body aches.  Warm salt water gargles for throat comfort.  Chloraseptic spray or lozenges for throat comfort.  Advise follow up with PCP  Advise go to ER if symptoms worsen or fail to improve with treatment.         Language Assistance Services     ATTENTION: Language assistance services are available, free of charge. Please call [phone].      ATENCIÓN: Si habla eric, tiene a castle disposición servicios gratuitos de asistencia lingüística. Llame al [phone].     CHÚ Ý: N?u b?n nói Ti?ng Vi?t, có các d?ch v? h? tr? ngôn ng? mi?n phí dành cho b?n. G?i s? [phone].         [facility] complies with applicable Federal civil rights laws and does not discriminate on the basis of race, color, national origin, age, disability, or sex.

## 2017-04-23 NOTE — PATIENT INSTRUCTIONS
PLAN: CXR,   Advised increased p.o. fluids  Nebulizer with Xopenex 1.25 for 15 and clinic now x 1  Drink plenty of clear fluids--at least 64 ounces of water/juice & rest  Simply saline nasal wash to irrigate sinuses and for congestion/runny nose.  Meds: Doxycycline, albuterol inhaler & Tessalon Perles  Practice good handwashing..  Mucinex for cough and chest congestion.  Tylenol  for fever, headache and body aches.  Warm salt water gargles for throat comfort.  Chloraseptic spray or lozenges for throat comfort.  Advise follow up with PCP  Advise go to ER if symptoms worsen or fail to improve with treatment.

## 2017-04-23 NOTE — PROGRESS NOTES
"Chief complaint/reason for visit: nonproductive cough, weak & fatigue    HISTORY OF PRESENT ILLNESS:  76 y/o  complains of nonproductive cough, nasal congestion, weak & fatigue onset 4 days ago.  Patient complains cough worse at night.  Admits tried medication with no relief. Patient admits has had Slight cough since abdominal surgery 3 weeks ago. Admits attempted to see PCP on Friday, told to be seen in urgent care. Denies seeking emergency treatment. Patient past history of tobacco use disorder & Pneumonia.     Past Medical History:   Diagnosis Date    Arthritis     Coronary artery disease     01/2015 LHC patent LCX. 50% stenosis in LAD and RCA.      Depression     GERD (gastroesophageal reflux disease)     Gout, arthritis     Hyperlipidemia     Hypertension     Hypothyroidism     Lung nodule 2014    RML--stable    Pacemaker     Metronic    Pneumonia        Past Surgical History:   Procedure Laterality Date    APPENDECTOMY      CARDIAC PACEMAKER PLACEMENT  01/22/2015    CHOLECYSTECTOMY      COLONOSCOPY N/A 4/6/2017    Procedure: COLONOSCOPY;  Surgeon: Tye Enamorado MD;  Location: [facility];  Service: Endoscopy;  Laterality: N/A;    CORONARY ANGIOPLASTY  02/2014    CORONARY STENT PLACEMENT  02/05/2014    GASTRIC BYPASS      HERNIA REPAIR      TONSILLECTOMY      TOTAL KNEE ARTHROPLASTY Bilateral        Family History   Problem Relation Age of Onset    Heart disease Mother     Hypertension Mother     Stomach cancer Father      "ulcers that turned to cancer"    Pancreatic cancer Sister     Leukemia Brother      "leukemia which led to intestinal cancer"            Social History     Social History    Marital status:      Spouse name: N/A    Number of children: N/A    Years of education: N/A     Occupational History    Not on file.     Social History Main Topics    Smoking status: Former Smoker    Smokeless tobacco: Not on file    Alcohol use No    Drug use: No    Sexual " activity: Not on file     Other Topics Concern    Not on file     Social History Narrative       ROS:  GENERAL: Reports weak &  fatigue.   SKIN: No rashes, itching or changes in color or texture of skin.  HEENT: Reports nasal congestion, hoarseness.   NODES: No masses or lesions. Denies swollen glands.  CHEST: Reports non productive cough. & slight wheezing  CARDIOVASCULAR: reports chest burning with cough, reports slight shortness of breath  ABDOMEN: Appetite fair, No weight loss.   MUSCULOSKELETAL: reports no back pain.  NEUROLOGIC: No history of seizures, paralysis, alteration of gait or coordination.  PSYCHIATRIC: Jim mood swings, depression.    PE:   APPEARANCE: Well nourished, well developed, in moderate distress, Pulse ox; 96%  SKIN: Normal skin turgor, no lesions.  HEENT: Turbinates red, Minimal red pharynx, TM's poor light reflex bilateral.  CHEST: Minimal expiratory wheezing on auscultation.  CARDIOVASCULAR: Regular rate and rhythm.   MUSCULOSKELETAL: JUÁREZ without difficulty  NEUROLOGIC: No sensory deficits. Gait & Posture: ambulates slowly with a walker, No cerebellar signs.  MENTAL STATUS: Patient alert, oriented x 3 & conversant.    PLAN: CXR,   Advised increased p.o. fluids  Nebulizer with Xopenex 1.25 for 15 and clinic now x 1  Drink plenty of clear fluids--at least 64 ounces of water/juice & rest  Simply saline nasal wash to irrigate sinuses and for congestion/runny nose.  Meds: Doxycycline, albuterol inhaler & Tessalon Perles  Practice good handwashing..  Mucinex for cough and chest congestion.  Tylenol  for fever, headache and body aches.  Warm salt water gargles for throat comfort.  Chloraseptic spray or lozenges for throat comfort.  Advise follow up with PCP  Advise go to ER if symptoms worsen or fail to improve with treatment.      DIAGNOSIS:  SOB  Fatigue  Hypertension  Bronchitis vs Pneumonia

## 2017-04-26 ENCOUNTER — OFFICE VISIT (OUTPATIENT)
Dept: SURGERY | Facility: CLINIC | Age: 77
End: 2017-04-26
Payer: MEDICARE

## 2017-04-26 ENCOUNTER — LAB VISIT (OUTPATIENT)
Dept: LAB | Facility: HOSPITAL | Age: 77
End: 2017-04-26
Attending: INTERNAL MEDICINE
Payer: MEDICARE

## 2017-04-26 ENCOUNTER — INITIAL CONSULT (OUTPATIENT)
Dept: HEMATOLOGY/ONCOLOGY | Facility: CLINIC | Age: 77
End: 2017-04-26
Payer: MEDICARE

## 2017-04-26 VITALS
TEMPERATURE: 98 F | SYSTOLIC BLOOD PRESSURE: 154 MMHG | WEIGHT: 179 LBS | HEIGHT: 64 IN | HEART RATE: 86 BPM | SYSTOLIC BLOOD PRESSURE: 130 MMHG | TEMPERATURE: 98 F | BODY MASS INDEX: 30.56 KG/M2 | HEART RATE: 78 BPM | WEIGHT: 179 LBS | DIASTOLIC BLOOD PRESSURE: 82 MMHG | BODY MASS INDEX: 30.73 KG/M2 | OXYGEN SATURATION: 98 % | DIASTOLIC BLOOD PRESSURE: 88 MMHG

## 2017-04-26 DIAGNOSIS — C83.33 DIFFUSE LARGE B-CELL LYMPHOMA OF INTRA-ABDOMINAL LYMPH NODES: ICD-10-CM

## 2017-04-26 DIAGNOSIS — D51.8 OTHER VITAMIN B12 DEFICIENCY ANEMIA: ICD-10-CM

## 2017-04-26 DIAGNOSIS — C83.33 DIFFUSE LARGE B-CELL LYMPHOMA OF INTRA-ABDOMINAL LYMPH NODES: Primary | ICD-10-CM

## 2017-04-26 DIAGNOSIS — C83.30 DIFFUSE LARGE B-CELL LYMPHOMA, UNSPECIFIED BODY REGION: ICD-10-CM

## 2017-04-26 DIAGNOSIS — Z11.4 SCREENING FOR HIV (HUMAN IMMUNODEFICIENCY VIRUS): ICD-10-CM

## 2017-04-26 DIAGNOSIS — Z98.84 S/P GASTRIC BYPASS: ICD-10-CM

## 2017-04-26 DIAGNOSIS — D64.9 CHRONIC ANEMIA: ICD-10-CM

## 2017-04-26 LAB
ALBUMIN SERPL BCP-MCNC: 3 G/DL
ALP SERPL-CCNC: 73 U/L
ALT SERPL W/O P-5'-P-CCNC: 10 U/L
ANION GAP SERPL CALC-SCNC: 11 MMOL/L
AST SERPL-CCNC: 23 U/L
BASOPHILS # BLD AUTO: 0.03 K/UL
BASOPHILS NFR BLD: 0.4 %
BILIRUB SERPL-MCNC: 0.2 MG/DL
BUN SERPL-MCNC: 16 MG/DL
CALCIUM SERPL-MCNC: 9.2 MG/DL
CHLORIDE SERPL-SCNC: 107 MMOL/L
CO2 SERPL-SCNC: 24 MMOL/L
CREAT SERPL-MCNC: 0.9 MG/DL
DIFFERENTIAL METHOD: ABNORMAL
EOSINOPHIL # BLD AUTO: 0.1 K/UL
EOSINOPHIL NFR BLD: 1.4 %
ERYTHROCYTE [DISTWIDTH] IN BLOOD BY AUTOMATED COUNT: 16.5 %
EST. GFR  (AFRICAN AMERICAN): >60 ML/MIN/1.73 M^2
EST. GFR  (NON AFRICAN AMERICAN): >60 ML/MIN/1.73 M^2
FERRITIN SERPL-MCNC: 111 NG/ML
FOLATE SERPL-MCNC: 14.6 NG/ML
GLUCOSE SERPL-MCNC: 92 MG/DL
HCT VFR BLD AUTO: 29.3 %
HGB BLD-MCNC: 9.1 G/DL
IRON SERPL-MCNC: 31 UG/DL
LDH SERPL L TO P-CCNC: 213 U/L
LYMPHOCYTES # BLD AUTO: 3.3 K/UL
LYMPHOCYTES NFR BLD: 41 %
MCH RBC QN AUTO: 27.7 PG
MCHC RBC AUTO-ENTMCNC: 31.1 %
MCV RBC AUTO: 89 FL
MONOCYTES # BLD AUTO: 0.5 K/UL
MONOCYTES NFR BLD: 5.9 %
NEUTROPHILS # BLD AUTO: 4.2 K/UL
NEUTROPHILS NFR BLD: 51.3 %
PLATELET # BLD AUTO: 532 K/UL
PMV BLD AUTO: 8.9 FL
POTASSIUM SERPL-SCNC: 3.6 MMOL/L
PROT SERPL-MCNC: 6.5 G/DL
RBC # BLD AUTO: 3.28 M/UL
SATURATED IRON: 9 %
SODIUM SERPL-SCNC: 142 MMOL/L
TOTAL IRON BINDING CAPACITY: 354 UG/DL
TRANSFERRIN SERPL-MCNC: 239 MG/DL
URATE SERPL-MCNC: 7.3 MG/DL
VIT B12 SERPL-MCNC: >2000 PG/ML
WBC # BLD AUTO: 8.09 K/UL

## 2017-04-26 PROCEDURE — 99999 PR PBB SHADOW E&M-EST. PATIENT-LVL V: CPT | Mod: PBBFAC,,, | Performed by: INTERNAL MEDICINE

## 2017-04-26 PROCEDURE — 82728 ASSAY OF FERRITIN: CPT

## 2017-04-26 PROCEDURE — 83540 ASSAY OF IRON: CPT

## 2017-04-26 PROCEDURE — 99499 UNLISTED E&M SERVICE: CPT | Mod: S$GLB,,, | Performed by: INTERNAL MEDICINE

## 2017-04-26 PROCEDURE — 99024 POSTOP FOLLOW-UP VISIT: CPT | Mod: S$GLB,,, | Performed by: SURGERY

## 2017-04-26 PROCEDURE — 87340 HEPATITIS B SURFACE AG IA: CPT

## 2017-04-26 PROCEDURE — 99999 PR PBB SHADOW E&M-EST. PATIENT-LVL III: CPT | Mod: PBBFAC,,, | Performed by: SURGERY

## 2017-04-26 PROCEDURE — 83615 LACTATE (LD) (LDH) ENZYME: CPT

## 2017-04-26 PROCEDURE — 3075F SYST BP GE 130 - 139MM HG: CPT | Mod: S$GLB,,, | Performed by: INTERNAL MEDICINE

## 2017-04-26 PROCEDURE — 1126F AMNT PAIN NOTED NONE PRSNT: CPT | Mod: S$GLB,,, | Performed by: INTERNAL MEDICINE

## 2017-04-26 PROCEDURE — 80053 COMPREHEN METABOLIC PANEL: CPT

## 2017-04-26 PROCEDURE — 86803 HEPATITIS C AB TEST: CPT

## 2017-04-26 PROCEDURE — 86704 HEP B CORE ANTIBODY TOTAL: CPT

## 2017-04-26 PROCEDURE — 82607 VITAMIN B-12: CPT

## 2017-04-26 PROCEDURE — 84550 ASSAY OF BLOOD/URIC ACID: CPT

## 2017-04-26 PROCEDURE — 36415 COLL VENOUS BLD VENIPUNCTURE: CPT

## 2017-04-26 PROCEDURE — 3079F DIAST BP 80-89 MM HG: CPT | Mod: S$GLB,,, | Performed by: INTERNAL MEDICINE

## 2017-04-26 PROCEDURE — 99499 UNLISTED E&M SERVICE: CPT | Mod: S$GLB,,, | Performed by: SURGERY

## 2017-04-26 PROCEDURE — 82746 ASSAY OF FOLIC ACID SERUM: CPT

## 2017-04-26 PROCEDURE — 86703 HIV-1/HIV-2 1 RESULT ANTBDY: CPT

## 2017-04-26 PROCEDURE — 99205 OFFICE O/P NEW HI 60 MIN: CPT | Mod: S$GLB,,, | Performed by: INTERNAL MEDICINE

## 2017-04-26 PROCEDURE — 85025 COMPLETE CBC W/AUTO DIFF WBC: CPT

## 2017-04-26 PROCEDURE — 1159F MED LIST DOCD IN RCRD: CPT | Mod: S$GLB,,, | Performed by: INTERNAL MEDICINE

## 2017-04-26 PROCEDURE — 1160F RVW MEDS BY RX/DR IN RCRD: CPT | Mod: S$GLB,,, | Performed by: INTERNAL MEDICINE

## 2017-04-26 NOTE — PROGRESS NOTES
MAIN COMPLAINT:  We are asked by Dr. 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    HISTORY OF PRESENT ILLNESS:  This is a 77-year-old  lady who was   admitted to the hospital in early April with diffuse abdominal pain.  A CT of   the abdomen done on 2017 was reported as showing a 6.9 x 7.0 x 8.4 cm soft   tissue mass in the right lower quadrant in the terminal ileum abutting the   cecum.  There was adjacent conglomerate adenopathy in the right lower quadrant   mesentery.    The patient had a colonoscopy that showed a lesion in the terminal ileum.    She underwent a right hemicolectomy and terminal ileum removal.  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    The patient comes today accompanied by her  and niece  to discuss what to   do going forward.    ALLERGIES:  Oxycodone.  The chart says that she has allergic to steroids, but   says she is not.    MEDICATIONS:  See MedCard.    PREVIOUS SURGERIES:  Appendectomy in , tonsillectomy at age 19, gastric   bypass with incidental cholecystectomy, bilateral knee replacement in .    SOCIAL HISTORY:  She is .  She had two natural children, and one adopted   one.  The adopted child has .  She lives in [place] with her   .  She smoked for two years, averaging a pack a day.  She stopped 35   years ago or so.  Denies any alcohol intake.  She worked in "[employer]    FAMILY HISTORY:  Father  of colon cancer.  Younger brother had leukemia that   transform into a lymphoma.  Sister had pancreatic cancer.    REVIEW OF SYSTEMS:  GENERAL:  Has lost 38 pounds in the last year.  EYES:  No vision problems or excessive lacrimation.  OROPHARYNX:  No difficulty or pain on swallowing.  ENDOCRINE:  No heat or cold intolerance.  LUNGS:  No shortness of breath or cough.  CARDIOVASCULAR:   She had a coronary stent placed in 2012, and pacemaker 2013.  GASTROINTESTINAL:  She had gastric bypass surgery because of morbid obesity.    She has had an incidental cholecystectomy.  No history of hepatitis or jaundice.    No diarrhea.  GENITOURINARY:  No hematuria, kidney stones or kidney or bladder problems.  NEUROLOGIC:  No headaches or seizures.  PSYCHIATRIC:  No mood swings or depression.  BREASTS:  No tenderness or lumps.  MUSCULOSKELETAL:  No neck, hip or back pain.    PHYSICAL EXAMINATION:  GENERAL:  She was well groomed.  She interacted normally during the interview.  VITAL SIGNS:  Weight 179 pounds, height 64 inches, blood pressure 130/82, pulse   66, respirations 12, temperature 97.9.  EYES:  No jaundice or paleness.  OROPHARYNX:  No ulcers.  No exudates.  ENDOCRINE:  No palpable thyroid.  LYMPHATICS:  No cervical or supraclavicular adenopathy.  NECK:  No jugular venous distension or masses.  LUNGS:  Clear and well ventilated without rales, wheezes, or rhonchi.  CARDIOVASCULAR:  The heart was rhythmic.  There were no murmurs or gallops.  ABDOMEN:  Soft.  No obvious hepatosplenomegaly, guarding or rebound.  Recent   surgical scar in the mid abdomen.  EXTREMITIES:  No edema.  Good dorsalis pedis and posterior tibialis pulses.  SKIN:  No rashes or ecchymosis.  NEUROLOGIC:  Coordination, strength and gait were normal.  PSYCHIATRIC:  Mood was appropriate.  Lucid and oriented x3.    DISCUSSION:  The patient has been told that she has a type of lymphoma that is   considered to be potentially curable.  She was told that over the next few days,   we need to do a series of tests that will include PET/CT scan, bone marrow   aspiration and biopsy and an echocardiogram to calculate cardiac ejection   fraction.  She will have additional blood tests today that will include a CBC,   CMP, ferritin, TIBC, B12, folic acid, LDH, uric acid, HIV 1 and 2, hepatitis C   antibody, hepatitis B surface antigen and hepatitis B  core antibody.  It is   noticed that the patient scored 10 in the straight score and she will be   referred to have the  Department as well as a clinical   psychologist.    The patient was allowed to ask questions.  She was told that once I get the   information regarding the tests that I have ordered, I will go in detail in   regards to the treatment that we are going to give her.        /josh [id number] blank(s)        PANTERA/OSTIO  dd: 04/26/2017 10:29:52 (CDT)  td: 04/26/2017 11:20:20 (CDT)  Doc ID   #[id number]  Job ID #[id number]    CC: Orlando Moulton M.D.

## 2017-04-26 NOTE — LETTER
April 26, 2017      Orlando Moulton MD  [address] [place] [address] [address] [place] [address]           [facility]  [address] [place] [address] [address]  [place] [address]  Phone: [phone]  Fax: [phone]          Patient: Violet Swenson   MR Number: [id number]   YOB: 1940   Date of Visit: 4/26/2017       Dear Dr. Orlando Moulton:    Thank you for referring Violet Swenson to me for evaluation. Attached you will find relevant portions of my assessment and plan of care.    If you have questions, please do not hesitate to call me. I look forward to following Violet Swenson along with you.    Sincerely,    Da Matson MD    Enclosure  CC:  No Recipients    If you would like to receive this communication electronically, please contact [email or web address] or [phone] to request more information on [email or web address] Link access.    For providers and/or their staff who would like to refer a patient to [facility], please contact us through our one-stop-shop provider referral line, [facility], at [phone].    If you feel you have received this communication in error or would no longer like to receive these types of communications, please e-mail [email or web address]

## 2017-04-26 NOTE — PROGRESS NOTES
Subjective:       Patient ID: Violet Swenson is a 77 y.o. female.    Chief Complaint: Post-op Evaluation    HPI   S/p right hemicolectomy 4/7/17 presents for post op. Her pain is well controlled and she is having regular bowel movements. She has difficulty eating solids well but reports this has been ongoing since her gastric bypass.  Review of Systems    Objective:      Physical Exam   Constitutional: She appears well-developed and well-nourished. No distress.   Abdominal: Soft. She exhibits no distension. There is no tenderness.   Incision healing well staples intact, no signs of infection   Vitals reviewed.      FINAL PATHOLOGIC DIAGNOSIS  1. COLON MASS, PARTIAL COLECTOMY- DIFFUSE LARGE B-CELL LYMPHOMA, GERMINAL CENTER  ORIGIN.  Assessment:   S/p right hemicolectomy with B-cell lymphoma  Plan:       -Oncology appt with Dr. Matson today  -f/u in 1 month

## 2017-04-26 NOTE — MR AVS SNAPSHOT
[facility]  [address] [address] [address]  Phone: [phone]  Fax: [phone]                  Violet Swenson   2017 9:20 AM   Office Visit    Description:  Female : 1940   Provider:  Orlando Moulton MD   Department:  [facility]           Reason for Visit     Post-op Evaluation                To Do List           Future Appointments        Provider Department Dept Phone    2017 10:00 AM Da Matson MD [facility] - Hematology Oncology [phone]    2017 11:00 AM Marti Coronel MD [facility] Internal Medicine [phone]    2017 10:20 AM Orlando Moulton MD [facility] General Surgery [phone]    2017 1:00 PM [facility], ONLC ARRHYTHMIA [facility] Arrhythmia Procedures [phone]    2017 8:20 AM Jackelin Layton NP [facility] Internal Medicine [phone]      Goals (5 Years of Data)     None      [facility]     [facility] Care Line -  Assistance  Unless otherwise directed by your provider, please contact [facility], our nurse care line that is available for  assistance.     Registered nurses in the [facility] provide: appointment scheduling, clinical advisement, health education, and other advisory services.  Call: [phone] (toll free)               Medications           Message regarding Medications     Verify the changes and/or additions to your medication regime listed below are the same as discussed with your clinician today.  If any of these changes or additions are incorrect, please notify your healthcare provider.             Verify that the below list of medications is an accurate representation of the medications you are currently taking.  If none reported, the list may be blank. If incorrect, please contact your healthcare provider. Carry this list with you in case of emergency.           Current Medications     albuterol 90 mcg/actuation  inhaler Inhale 1-2 puffs into the lungs every 6 (six) hours as needed for Wheezing. Rescue    allopurinol (ZYLOPRIM) 100 MG tablet TAKE 2 TABLET BY MOUTH DAILY    alprazolam (XANAX) 0.25 MG tablet Take 1 tablet (0.25 mg total) by mouth 3 (three) times daily as needed for Anxiety.    aspirin (ECOTRIN) 81 MG EC tablet Take 81 mg by mouth once daily.     benzonatate (TESSALON) 100 MG capsule Take 2 capsules (200 mg total) by mouth 3 (three) times daily as needed.    clopidogrel (PLAVIX) 75 mg tablet Take 1 tablet (75 mg total) by mouth once daily.    COLCRYS 0.6 mg tablet TAKE 1 TABLET(0.6 MG) BY MOUTH EVERY DAY    docusate sodium (COLACE) 100 MG capsule Take 1 capsule (100 mg total) by mouth 2 (two) times daily as needed for Constipation.    doxycycline (MONODOX) 100 MG capsule Take 1 capsule (100 mg total) by mouth 2 (two) times daily.    ergocalciferol, vitamin D2, 400 unit Tab Take by mouth daily.    fluticasone (FLONASE) 50 mcg/actuation nasal spray 2 sprays by Each Nare route once daily.    gabapentin (NEURONTIN) 100 MG capsule Take 100 mg by mouth 3 (three) times daily.    hydrochlorothiazide (HYDRODIURIL) 12.5 MG Tab TAKE 1 TABLET BY MOUTH ONCE DAILY    hydrocodone-acetaminophen 5-325mg (NORCO) 5-325 mg per tablet Take 1 tablet by mouth every 4 (four) hours as needed.    isosorbide mononitrate (IMDUR) 30 MG 24 hr tablet TAKE 1 TABLET BY MOUTH EVERY DAY    levothyroxine (SYNTHROID) 75 MCG tablet TAKE 1 TABLET(75 MCG) BY MOUTH BEFORE BREAKFAST    meloxicam (MOBIC) 7.5 MG tablet TAKE 1 TABLET BY MOUTH ONCE DAILY AS NEEDED FOR PAIN    nitroglycerin 400 mcg/spray SprA Place onto the tongue.    ondansetron (ZOFRAN-ODT) 8 MG TbDL Take 1 tablet (8 mg total) by mouth every 8 (eight) hours as needed (Nausea).    ranitidine (ZANTAC) 150 MG tablet Take 150 mg by mouth nightly.    rosuvastatin (CRESTOR) 10 MG tablet Take 1 tablet (10 mg total) by mouth once daily.    sertraline (ZOLOFT) 100 MG tablet Take 1 tablet (100 mg  total) by mouth once daily.    turmeric root extract 500 mg Cap Take 500 mg by mouth 2 (two) times daily.    verapamil (CALAN-SR) 120 MG CR tablet TAKE 1/2 TABLET BY MOUTH NIGHTLY    ZINC ACETATE ORAL 250 mg.           Clinical Reference Information           Your Vitals Were     BP Pulse Temp Weight BMI    154/88 78 98.2 °F (36.8 °C) 81.2 kg (179 lb 0.2 oz) 30.73 kg/m2      Blood Pressure          Most Recent Value    BP  (!)  154/88      Allergies as of 4/26/2017     Corticosteroids (Glucocorticoids)    Oxycodone      Immunizations Administered on Date of Encounter - 4/26/2017     None      [facility] Sign-Up     Activating your [facility] account is as easy as 1-2-3!     1) Visit [email or web address], select Sign Up Now, enter this activation code and your date of birth, then select Next.  Activation code not generated  Current Patient Portal Status: Account disabled      2) Create a username and password to use when you visit [facility] in the future and select a security question in case you lose your password and select Next.    3) Enter your e-mail address and click Sign Up!    Additional Information  If you have questions, please e-mail [email or web address] 2ours or call [phone] to talk to our [facility] staff. Remember, [facility] is NOT to be used for urgent needs. For medical emergencies, dial 911.         Language Assistance Services     ATTENTION: Language assistance services are available, free of charge. Please call [phone].      ATENCIÓN: Si habla chidiañol, tiene a castle disposición servicios gratuitos de asistencia lingüística. Llame al [phone].     CHÚ Ý: N?u b?n nói Ti?ng Vi?t, có các d?ch v? h? tr? ngôn ng? mi?n phí dành cho b?n. G?i s? [phone].         [facility] complies with applicable Federal civil rights laws and does not discriminate on the basis of race, color, national origin, age, disability, or sex.

## 2017-04-27 ENCOUNTER — OFFICE VISIT (OUTPATIENT)
Dept: INTERNAL MEDICINE | Facility: CLINIC | Age: 77
End: 2017-04-27
Payer: MEDICARE

## 2017-04-27 VITALS
HEART RATE: 87 BPM | WEIGHT: 177.5 LBS | BODY MASS INDEX: 30.3 KG/M2 | HEIGHT: 64 IN | TEMPERATURE: 98 F | SYSTOLIC BLOOD PRESSURE: 122 MMHG | DIASTOLIC BLOOD PRESSURE: 68 MMHG

## 2017-04-27 DIAGNOSIS — F41.8 SITUATIONAL ANXIETY: ICD-10-CM

## 2017-04-27 DIAGNOSIS — J20.9 ACUTE BRONCHITIS, UNSPECIFIED ORGANISM: Primary | ICD-10-CM

## 2017-04-27 DIAGNOSIS — C83.33 DIFFUSE LARGE B-CELL LYMPHOMA OF INTRA-ABDOMINAL LYMPH NODES: ICD-10-CM

## 2017-04-27 LAB
HBV CORE AB SERPL QL IA: NEGATIVE
HBV SURFACE AG SERPL QL IA: NEGATIVE
HCV AB SERPL QL IA: NEGATIVE
HIV 1+2 AB+HIV1 P24 AG SERPL QL IA: NEGATIVE

## 2017-04-27 PROCEDURE — 99499 UNLISTED E&M SERVICE: CPT | Mod: S$GLB,,, | Performed by: FAMILY MEDICINE

## 2017-04-27 PROCEDURE — 99999 PR PBB SHADOW E&M-EST. PATIENT-LVL III: CPT | Mod: PBBFAC,,, | Performed by: FAMILY MEDICINE

## 2017-04-27 RX ORDER — ALPRAZOLAM 0.25 MG/1
0.25 TABLET ORAL 2 TIMES DAILY PRN
Qty: 60 TABLET | Refills: 5 | Status: SHIPPED | OUTPATIENT
Start: 2017-04-27 | End: 2017-12-18 | Stop reason: SDUPTHER

## 2017-04-27 NOTE — MR AVS SNAPSHOT
[facility]  [address] [address] [facility]  [place] [address]  Phone: [phone]  Fax: [phone]                  Violet Swenson   2017 11:00 AM   Office Visit    Description:  Female : 1940   Provider:  Marti Coronel MD   Department:  [facility]           Reason for Visit     Bronchitis           Diagnoses this Visit        Comments    Acute bronchitis, unspecified organism    -  Primary     Situational anxiety                To Do List           Future Appointments        Provider Department Dept Phone    5/3/2017 8:45 AM ECHOCARDIOGRAM, [facility] -Cardiology [phone]    5/3/2017 9:45 AM SUMH PETCT1 LIMIT 400 LBS [facility] [phone]    2017 10:00 AM Da Matson MD [facility] Hemotology Oncology [phone]    2017 10:20 AM Orlando Moulton MD [facility] - General Surgery [phone]    2017 1:00 PM PACEMAKER CLINIC, ON ARRHYTHMIA [facility] Arrhythmia Procedures [phone]      Goals (5 Years of Data)     None      Follow-Up and Disposition     Return if symptoms worsen or fail to improve, for keep routine follow up.       These Medications        Disp Refills Start End    inhalation spacing device (RITEFLO AEROCHAMBER) 1 Device 0 2017     Use as directed for inhalation.    Pharmacy: [facility] [address] [place] [address] AVE  [facility] [place] [address] [place] Ph #: [phone]       alprazolam (XANAX) 0.25 MG tablet 60 tablet 5 2017    Take 1 tablet (0.25 mg total) by mouth 2 (two) times daily as needed for Anxiety. - Oral    Pharmacy: [facility] [id number] [place] [address] [facility] [place] [address] [place] Ph #: [phone]         [facility]     [facility] Nurse Care [facility] - 24/7 Assistance  Unless otherwise directed by your provider, please contact [facility], our nurse care line that is available for 24/7 assistance.      Registered nurses in the [facility] provide: appointment scheduling, clinical advisement, health education, and other advisory services.  Call: [phone] (toll free)               Medications           Message regarding Medications     Verify the changes and/or additions to your medication regime listed below are the same as discussed with your clinician today.  If any of these changes or additions are incorrect, please notify your healthcare provider.        START taking these NEW medications        Refills    inhalation spacing device (RITEFLO AEROCHAMBER) 0    Sig: Use as directed for inhalation.    Class: Normal      CHANGE how you are taking these medications     Start Taking Instead of    alprazolam (XANAX) 0.25 MG tablet alprazolam (XANAX) 0.25 MG tablet    Dosage:  Take 1 tablet (0.25 mg total) by mouth 2 (two) times daily as needed for Anxiety. Dosage:  Take 1 tablet (0.25 mg total) by mouth 3 (three) times daily as needed for Anxiety.    Reason for Change:  Reorder            Verify that the below list of medications is an accurate representation of the medications you are currently taking.  If none reported, the list may be blank. If incorrect, please contact your healthcare provider. Carry this list with you in case of emergency.           Current Medications     albuterol 90 mcg/actuation inhaler Inhale 1-2 puffs into the lungs every 6 (six) hours as needed for Wheezing. Rescue    allopurinol (ZYLOPRIM) 100 MG tablet TAKE 2 TABLET BY MOUTH DAILY    alprazolam (XANAX) 0.25 MG tablet Take 1 tablet (0.25 mg total) by mouth 2 (two) times daily as needed for Anxiety.    aspirin (ECOTRIN) 81 MG EC tablet Take 81 mg by mouth once daily.     benzonatate (TESSALON) 100 MG capsule Take 2 capsules (200 mg total) by mouth 3 (three) times daily as needed.    clopidogrel (PLAVIX) 75 mg tablet Take 1 tablet (75 mg total) by mouth once daily.    COLCRYS 0.6 mg tablet TAKE 1 TABLET(0.6 MG) BY MOUTH  "EVERY DAY    docusate sodium (COLACE) 100 MG capsule Take 1 capsule (100 mg total) by mouth 2 (two) times daily as needed for Constipation.    doxycycline (MONODOX) 100 MG capsule Take 1 capsule (100 mg total) by mouth 2 (two) times daily.    ergocalciferol, vitamin D2, 400 unit Tab Take by mouth daily.    fluticasone (FLONASE) 50 mcg/actuation nasal spray 2 sprays by Each Nare route once daily.    gabapentin (NEURONTIN) 100 MG capsule Take 100 mg by mouth 3 (three) times daily.    hydrochlorothiazide (HYDRODIURIL) 12.5 MG Tab TAKE 1 TABLET BY MOUTH ONCE DAILY    hydrocodone-acetaminophen 5-325mg (NORCO) 5-325 mg per tablet Take 1 tablet by mouth every 4 (four) hours as needed.    isosorbide mononitrate (IMDUR) 30 MG 24 hr tablet TAKE 1 TABLET BY MOUTH EVERY DAY    levothyroxine (SYNTHROID) 75 MCG tablet TAKE 1 TABLET(75 MCG) BY MOUTH BEFORE BREAKFAST    meloxicam (MOBIC) 7.5 MG tablet TAKE 1 TABLET BY MOUTH ONCE DAILY AS NEEDED FOR PAIN    nitroglycerin 400 mcg/spray SprA Place onto the tongue.    ondansetron (ZOFRAN-ODT) 8 MG TbDL Take 1 tablet (8 mg total) by mouth every 8 (eight) hours as needed (Nausea).    ranitidine (ZANTAC) 150 MG tablet Take 150 mg by mouth nightly.    rosuvastatin (CRESTOR) 10 MG tablet Take 1 tablet (10 mg total) by mouth once daily.    sertraline (ZOLOFT) 100 MG tablet Take 1 tablet (100 mg total) by mouth once daily.    turmeric root extract 500 mg Cap Take 500 mg by mouth 2 (two) times daily.    verapamil (CALAN-SR) 120 MG CR tablet TAKE 1/2 TABLET BY MOUTH NIGHTLY    ZINC ACETATE ORAL 250 mg.    inhalation spacing device (RITEFLO AEROCHAMBER) Use as directed for inhalation.           Clinical Reference Information           Your Vitals Were     BP Pulse Temp Height Weight BMI    122/68 (BP Location: Left arm, Patient Position: Sitting, BP Method: Manual) 87 98 °F (36.7 °C) (Tympanic) 5' 4" (1.626 m) 80.5 kg (177 lb 7.5 oz) 30.46 kg/m2      Blood Pressure          Most Recent Value    " BP  122/68      Allergies as of 4/27/2017     Corticosteroids (Glucocorticoids)    Oxycodone      Immunizations Administered on Date of Encounter - 4/27/2017     None      [facility] Sign-Up     Activating your [facility] account is as easy as 1-2-3!     1) Visit [email or web address], select Sign Up Now, enter this activation code and your date of birth, then select Next.  Activation code not generated  Current Patient Portal Status: Account disabled      2) Create a username and password to use when you visit [facility] in the future and select a security question in case you lose your password and select Next.    3) Enter your e-mail address and click Sign Up!    Additional Information  If you have questions, please e-mail [email or web address] or call [phone] to talk to our [facility] staff. Remember, [employer] is NOT to be used for urgent needs. For medical emergencies, dial 911.         Instructions      Step-by-Step  Using an Inhaler with a Spacer    Date Last Reviewed: 5/29/2015  © 8803-9593 [employer]. [address] [place] [address]. All rights reserved. This information is not intended as a substitute for professional medical care. Always follow your healthcare professional's instructions.             Language Assistance Services     ATTENTION: Language assistance services are available, free of charge. Please call [phone].      ATENCIÓN: Si habla español, tiene a castle disposición servicios gratuitos de asistencia lingüística. Llame al [phone].     CHÚ Ý: N?u b?n nói Ti?ng Vi?t, có các d?ch v? h? tr? ngôn ng? mi?n phí dành cho b?n. G?i s? [phone].         [facility] complies with applicable Federal civil rights laws and does not discriminate on the basis of race, color, national origin, age, disability, or sex.

## 2017-04-27 NOTE — PATIENT INSTRUCTIONS
Step-by-Step  Using an Inhaler with a Spacer    Date Last Reviewed: 5/29/2015  © 2664-3898 [employer]. [address] [place] [address], [place] [address]. All rights reserved. This information is not intended as a substitute for professional medical care. Always follow your healthcare professional's instructions.

## 2017-04-30 DIAGNOSIS — I25.110 CORONARY ARTERY DISEASE INVOLVING NATIVE CORONARY ARTERY OF NATIVE HEART WITH UNSTABLE ANGINA PECTORIS: ICD-10-CM

## 2017-04-30 DIAGNOSIS — M1A.09X0 IDIOPATHIC CHRONIC GOUT OF MULTIPLE SITES WITHOUT TOPHUS: ICD-10-CM

## 2017-04-30 RX ORDER — ISOSORBIDE MONONITRATE 30 MG/1
TABLET, EXTENDED RELEASE ORAL
Qty: 30 TABLET | Refills: 0 | Status: CANCELLED | OUTPATIENT
Start: 2017-04-30

## 2017-04-30 NOTE — PROGRESS NOTES
Transitional Care Note  Subjective:       Patient ID: Violet Swenson is a 77 y.o. female.  Chief Complaint: Bronchitis ([facility] f/u)    Family and/or Caretaker present at visit?  No.  Diagnostic tests reviewed/disposition: No diagnosic tests pending after this hospitalization.  Disease/illness education: see A/P  Home health/community services discussion/referrals: Patient has home health established at [facility].   Establishment or re-establishment of referral orders for community resources: No other community resources needed.  Discussion with other health care providers: No discussion with other health care providers necessary.   HPI Comments: Patient presents to clinic today for TCC/hospital follow up and urgent care follow up of bronchitis. Patient recently admitted and diagnosed with B cell lymphoma. She has seen Dr. Matson and is currently undergoing work up prior to starting treatment. She reports her son was recently diagnosed with prostate cancer and is undergoing treatment. She reports significant anxiety with all of these life changes. She was seen in urgent care and treated with doxycycline for bronchitis vs pneumonia. CXR negative for pneumonia.     Review of Systems   Constitutional: Positive for fatigue. Negative for chills, fever and unexpected weight change.   Eyes: Negative for visual disturbance.   Respiratory: Positive for cough. Negative for shortness of breath.    Cardiovascular: Negative for chest pain.   Musculoskeletal: Negative for myalgias.   Neurological: Negative for headaches.   Psychiatric/Behavioral: The patient is nervous/anxious.        Objective:      Physical Exam   Constitutional: She is oriented to person, place, and time. She appears well-developed and well-nourished. No distress.   HENT:   Head: Normocephalic and atraumatic.   Eyes: Conjunctivae and EOM are normal. Pupils are equal, round, and reactive to light. No scleral icterus.   Cardiovascular: Normal  rate and regular rhythm.  Exam reveals no gallop and no friction rub.    No murmur heard.  Pulmonary/Chest: Effort normal and breath sounds normal.   Neurological: She is alert and oriented to person, place, and time. No cranial nerve deficit. Gait normal.   Psychiatric: She has a normal mood and affect.   Vitals reviewed.      Assessment:       1. Acute bronchitis, unspecified organism    2. Situational anxiety    3. Diffuse large B-cell lymphoma of intra-abdominal lymph nodes        Plan:       Violet was seen today for bronchitis.    Diagnoses and all orders for this visit:    Acute bronchitis, unspecified organism  -     inhalation spacing device (RITEFLO AEROCHAMBER); Use as directed for inhalation.    Situational anxiety  -     alprazolam (XANAX) 0.25 MG tablet; Take 1 tablet (0.25 mg total) by mouth 2 (two) times daily as needed for Anxiety.    Diffuse large B-cell lymphoma of intra-abdominal lymph nodes  Comments:  followed by Dr. Matson

## 2017-05-01 RX ORDER — ALLOPURINOL 100 MG/1
TABLET ORAL
Qty: 180 TABLET | Refills: 0 | Status: SHIPPED | OUTPATIENT
Start: 2017-05-01 | End: 2017-08-08 | Stop reason: SDUPTHER

## 2017-05-02 ENCOUNTER — TELEPHONE (OUTPATIENT)
Dept: RADIOLOGY | Facility: HOSPITAL | Age: 77
End: 2017-05-02

## 2017-05-02 ENCOUNTER — TELEPHONE (OUTPATIENT)
Dept: HEMATOLOGY/ONCOLOGY | Facility: CLINIC | Age: 77
End: 2017-05-02

## 2017-05-02 DIAGNOSIS — C85.80 LYMPHOMA MALIGNANT, LARGE CELL: Primary | ICD-10-CM

## 2017-05-03 ENCOUNTER — CLINICAL SUPPORT (OUTPATIENT)
Dept: CARDIOLOGY | Facility: CLINIC | Age: 77
End: 2017-05-03
Payer: MEDICARE

## 2017-05-03 ENCOUNTER — HOSPITAL ENCOUNTER (OUTPATIENT)
Dept: RADIOLOGY | Facility: HOSPITAL | Age: 77
Discharge: HOME | End: 2017-05-03
Attending: INTERNAL MEDICINE
Payer: MEDICARE

## 2017-05-03 DIAGNOSIS — C83.33 DIFFUSE LARGE B-CELL LYMPHOMA OF INTRA-ABDOMINAL LYMPH NODES: ICD-10-CM

## 2017-05-03 DIAGNOSIS — D64.9 CHRONIC ANEMIA: ICD-10-CM

## 2017-05-03 DIAGNOSIS — Z98.84 S/P GASTRIC BYPASS: ICD-10-CM

## 2017-05-03 DIAGNOSIS — D51.8 OTHER VITAMIN B12 DEFICIENCY ANEMIA: ICD-10-CM

## 2017-05-03 LAB
ESTIMATED PA SYSTOLIC PRESSURE: 23.79
MITRAL VALVE REGURGITATION: NORMAL
RETIRED EF AND QEF - SEE NOTES: 55 (ref 55–65)
TRICUSPID VALVE REGURGITATION: NORMAL

## 2017-05-03 PROCEDURE — A9552 F18 FDG: HCPCS | Mod: PO

## 2017-05-03 PROCEDURE — 78815 PET IMAGE W/CT SKULL-THIGH: CPT | Mod: 26,,, | Performed by: RADIOLOGY

## 2017-05-03 PROCEDURE — 93306 TTE W/DOPPLER COMPLETE: CPT | Mod: S$GLB,,, | Performed by: INTERNAL MEDICINE

## 2017-05-05 ENCOUNTER — TELEPHONE (OUTPATIENT)
Dept: HEMATOLOGY/ONCOLOGY | Facility: CLINIC | Age: 77
End: 2017-05-05

## 2017-05-05 ENCOUNTER — LAB VISIT (OUTPATIENT)
Dept: LAB | Facility: HOSPITAL | Age: 77
End: 2017-05-05
Attending: INTERNAL MEDICINE
Payer: MEDICARE

## 2017-05-05 ENCOUNTER — OFFICE VISIT (OUTPATIENT)
Dept: HEMATOLOGY/ONCOLOGY | Facility: CLINIC | Age: 77
End: 2017-05-05
Payer: MEDICARE

## 2017-05-05 VITALS
TEMPERATURE: 98 F | HEIGHT: 64 IN | WEIGHT: 171.31 LBS | DIASTOLIC BLOOD PRESSURE: 90 MMHG | HEART RATE: 85 BPM | RESPIRATION RATE: 20 BRPM | SYSTOLIC BLOOD PRESSURE: 150 MMHG | BODY MASS INDEX: 29.24 KG/M2 | OXYGEN SATURATION: 98 %

## 2017-05-05 DIAGNOSIS — D64.9 CHRONIC ANEMIA: ICD-10-CM

## 2017-05-05 DIAGNOSIS — C85.80 LYMPHOMA MALIGNANT, LARGE CELL: Primary | ICD-10-CM

## 2017-05-05 DIAGNOSIS — C83.33 DIFFUSE LARGE B-CELL LYMPHOMA OF INTRA-ABDOMINAL LYMPH NODES: ICD-10-CM

## 2017-05-05 DIAGNOSIS — C85.80 LYMPHOMA MALIGNANT, LARGE CELL: ICD-10-CM

## 2017-05-05 DIAGNOSIS — I25.5 ISCHEMIC CARDIOMYOPATHY: ICD-10-CM

## 2017-05-05 DIAGNOSIS — I50.22 CHRONIC SYSTOLIC CONGESTIVE HEART FAILURE: ICD-10-CM

## 2017-05-05 PROCEDURE — 82955 ASSAY OF G6PD ENZYME: CPT

## 2017-05-05 PROCEDURE — 36415 COLL VENOUS BLD VENIPUNCTURE: CPT | Mod: PO

## 2017-05-05 PROCEDURE — 1160F RVW MEDS BY RX/DR IN RCRD: CPT | Mod: S$GLB,,, | Performed by: INTERNAL MEDICINE

## 2017-05-05 PROCEDURE — 3080F DIAST BP >= 90 MM HG: CPT | Mod: S$GLB,,, | Performed by: INTERNAL MEDICINE

## 2017-05-05 PROCEDURE — 99999 PR PBB SHADOW E&M-EST. PATIENT-LVL IV: CPT | Mod: PBBFAC,,, | Performed by: INTERNAL MEDICINE

## 2017-05-05 PROCEDURE — 99215 OFFICE O/P EST HI 40 MIN: CPT | Mod: S$GLB,,, | Performed by: INTERNAL MEDICINE

## 2017-05-05 PROCEDURE — 1126F AMNT PAIN NOTED NONE PRSNT: CPT | Mod: S$GLB,,, | Performed by: INTERNAL MEDICINE

## 2017-05-05 PROCEDURE — 3077F SYST BP >= 140 MM HG: CPT | Mod: S$GLB,,, | Performed by: INTERNAL MEDICINE

## 2017-05-05 PROCEDURE — 1159F MED LIST DOCD IN RCRD: CPT | Mod: S$GLB,,, | Performed by: INTERNAL MEDICINE

## 2017-05-05 PROCEDURE — 99499 UNLISTED E&M SERVICE: CPT | Mod: S$GLB,,, | Performed by: INTERNAL MEDICINE

## 2017-05-05 NOTE — TELEPHONE ENCOUNTER
----- Message from Da Matson MD sent at 5/5/2017  2:15 PM CDT -----  Needs a MUGA scan. Please let patient know.  This is a test ordered by her cardiologist  DR MATSON

## 2017-05-05 NOTE — TELEPHONE ENCOUNTER
----- Message from Da Matson MD sent at 5/5/2017  2:15 PM CDT -----  Needs a MUGA scan. Please let patient know.  This is a test ordered by her cardiologist  DR AMTSON

## 2017-05-05 NOTE — PROGRESS NOTES
Subjective:       Patient ID: Violet Swenson is a 77 y.o. female.    Chief Complaint: Follow-up    HPI This is a 77-year-old  lady who comes for follow up of hr large cell lymphoma.  She  was   admitted to the hospital in early April with diffuse abdominal pain. A CT of   the abdomen done on 2017 was reported as showing a 6.9 x 7.0 x 8.4 cm soft  tissue mass in the right lower quadrant in the terminal ileum abutting the   cecum. There was adjacent conglomerate adenopathy in the right lower quadrant   mesentery.     The patient had a colonoscopy that showed a lesion in the terminal ileum.     She underwent a right hemicolectomy and terminal ileum removal. 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  She had a Ct/PET that shows evidence of surgery and some non specific  findings, but no evidence of activer disease elsewhere.  An ECHO showed normal  cardiac ejection fraction     The patient comes today accompanied by her      ALLERGIES: Oxycodone. The chart says that she has allergic to steroids, but   says she is not.     MEDICATIONS: See MedCard.     PREVIOUS SURGERIES: Appendectomy in , tonsillectomy at age 19, gastric   bypass with incidental cholecystectomy, bilateral knee replacement in .     SOCIAL HISTORY: She is . She had two natural children, and one adopted   one. The adopted child has . She lives in [place] with her   . She smoked for two years, averaging a pack a day. She stopped 35   years ago or so. Denies any alcohol intake. She worked in [employer].     FAMILY HISTORY: Father  of colon cancer. Younger brother had leukemia that  transform into a lymphoma. Sister had pancreatic cancer.  Review of Systems   Constitutional: Positive for unexpected weight change. Negative for appetite change.   HENT: Negative.    Eyes: Negative.  Negative for visual disturbance.   Respiratory: Negative.  Negative  for cough and wheezing.    Cardiovascular: Negative.  Negative for chest pain.   Gastrointestinal: Positive for diarrhea. Negative for abdominal pain.   Genitourinary: Positive for frequency.   Musculoskeletal: Negative for back pain.   Skin: Negative for rash.   Neurological: Negative.  Negative for headaches.   Hematological: Negative for adenopathy.   Psychiatric/Behavioral: Negative.  The patient is not nervous/anxious.        Objective:      Physical Exam   Constitutional: She is oriented to person, place, and time. She appears well-developed. No distress.   HENT:   Head: Normocephalic.   Right Ear: Tympanic membrane, external ear and ear canal normal.   Left Ear: Tympanic membrane, external ear and ear canal normal.   Nose: Nose normal. Right sinus exhibits no maxillary sinus tenderness and no frontal sinus tenderness. Left sinus exhibits no maxillary sinus tenderness and no frontal sinus tenderness.   Mouth/Throat: Oropharynx is clear and moist and mucous membranes are normal.   Teeth normal.  Gums normal.   Eyes: Conjunctivae and lids are normal. Pupils are equal, round, and reactive to light.   Neck: Normal carotid pulses, no hepatojugular reflux and no JVD present. Carotid bruit is not present. No tracheal deviation present. No thyroid mass and no thyromegaly present.   Cardiovascular: Normal rate, regular rhythm, S1 normal, S2 normal, normal heart sounds and intact distal pulses.  Exam reveals no gallop and no friction rub.    No murmur heard.  Carotid exam normal   Pulmonary/Chest: Effort normal and breath sounds normal. No accessory muscle usage. No respiratory distress. She has no wheezes. She has no rales. She exhibits no tenderness.   Abdominal: Soft. Normal appearance. She exhibits no distension and no mass. There is no splenomegaly or hepatomegaly. There is no tenderness. There is no rebound and no guarding.   Musculoskeletal: Normal range of motion. She exhibits no edema or tenderness.         Right hand: Normal.        Left hand: Normal.       Lymphadenopathy:     She has no cervical adenopathy.     She has no axillary adenopathy.        Right: No inguinal and no supraclavicular adenopathy present.        Left: No inguinal and no supraclavicular adenopathy present.   Neurological: She is alert and oriented to person, place, and time. She has normal strength. No cranial nerve deficit. Coordination normal.   Skin: Skin is warm and dry. No rash noted. She is not diaphoretic. No cyanosis or erythema. No pallor. Nails show no clubbing.   Psychiatric: She has a normal mood and affect. Her behavior is normal. Judgment and thought content normal.     .  Wt Readings from Last 3 Encounters:   05/05/17 77.7 kg (171 lb 4.8 oz)   04/27/17 80.5 kg (177 lb 7.5 oz)   04/26/17 81.2 kg (179 lb 0.2 oz)     Temp Readings from Last 3 Encounters:   05/05/17 97.7 °F (36.5 °C) (Oral)   04/27/17 98 °F (36.7 °C) (Tympanic)   04/26/17 97.9 °F (36.6 °C) (Oral)     BP Readings from Last 3 Encounters:   05/05/17 (!) 150/90   04/27/17 122/68   04/26/17 130/82     Pulse Readings from Last 3 Encounters:   05/05/17 85   04/27/17 87   04/26/17 86       Assessment:       1. Lymphoma malignant, large cell    2. Diffuse large B-cell lymphoma of intra-abdominal lymph nodes    3. Chronic anemia        Plan:       Lab Results   Component Value Date    WBC 8.09 04/26/2017    HGB 9.1 (L) 04/26/2017    HCT 29.3 (L) 04/26/2017    MCV 89 04/26/2017     (H) 04/26/2017     Lab Results   Component Value Date    CREATININE 0.9 04/26/2017     We discussed with the patient the reports of the CT/PET and the ECHO.  We discussed that the standard of care is to give R-CHOP x 4-6 cycles. We discussed potential side effects including myelosuppression, alopecia, nausea, vomiting, need for Mediport, diarrhea, mucositis and cardio-toxicity  She ahd an ECHO with a normal cardiac ejection fraction just a few days but she seems to have an important past  medical history of caridac disease.  We will have her meet with Dr Gil, her cardiologist, to see if she can get ADRIAMYCIN  She will need a bone amrrow to be done on May 15.  She will meet with Dr Christensen for a Medi-port.  She will see me on May 18 with a cbc and a cmp.  She is already on Allopurinol.  A G6PD will be drawn today in anticipation of Elitek use.  Hep B/C and HIV serologies are negative  Complex visit requiring 45 minutes of direct patient care,addressing multiple issues and discussing case with other specilaists      Lab Results   Component Value Date    ALT 10 04/26/2017    AST 23 04/26/2017    ALKPHOS 73 04/26/2017    BILITOT 0.2 04/26/2017

## 2017-05-08 LAB — G6PD RBC-CCNT: 16.9 U/G HGB (ref 7–20.5)

## 2017-05-10 ENCOUNTER — OFFICE VISIT (OUTPATIENT)
Dept: SURGERY | Facility: CLINIC | Age: 77
End: 2017-05-10
Payer: MEDICARE

## 2017-05-10 ENCOUNTER — TELEPHONE (OUTPATIENT)
Dept: HEMATOLOGY/ONCOLOGY | Facility: CLINIC | Age: 77
End: 2017-05-10

## 2017-05-10 VITALS
BODY MASS INDEX: 29.82 KG/M2 | SYSTOLIC BLOOD PRESSURE: 137 MMHG | DIASTOLIC BLOOD PRESSURE: 83 MMHG | TEMPERATURE: 98 F | WEIGHT: 173.75 LBS | HEART RATE: 81 BPM

## 2017-05-10 DIAGNOSIS — C83.33 DIFFUSE LARGE B-CELL LYMPHOMA OF INTRA-ABDOMINAL LYMPH NODES: ICD-10-CM

## 2017-05-10 DIAGNOSIS — C85.90 LYMPHOMA, UNSPECIFIED BODY REGION, UNSPECIFIED LYMPHOMA TYPE: Primary | ICD-10-CM

## 2017-05-10 PROCEDURE — 99999 PR PBB SHADOW E&M-EST. PATIENT-LVL II: CPT | Mod: PBBFAC,,, | Performed by: SURGERY

## 2017-05-10 PROCEDURE — 99499 UNLISTED E&M SERVICE: CPT | Mod: S$GLB,,, | Performed by: SURGERY

## 2017-05-10 PROCEDURE — 99024 POSTOP FOLLOW-UP VISIT: CPT | Mod: S$GLB,,, | Performed by: SURGERY

## 2017-05-10 RX ORDER — SODIUM CHLORIDE 9 MG/ML
INJECTION, SOLUTION INTRAVENOUS CONTINUOUS
Status: CANCELLED | OUTPATIENT
Start: 2017-05-10

## 2017-05-10 NOTE — PROGRESS NOTES
"Subjective:       Patient ID: Violet Swenson is a 77 y.o. female.    Chief Complaint: No chief complaint on file.    HPI   S/p right hemicolectomy 4/7/17 presents to discuss port placement. She is doing well without complaints.      Past Medical History:   Diagnosis Date    Arthritis     Coronary artery disease     01/2015 [facility] patent LCX. 50% stenosis in LAD and RCA.      Depression     GERD (gastroesophageal reflux disease)     Gout, arthritis     Hyperlipidemia     Hypertension     Hypothyroidism     Lung nodule 2014    RML--stable    Pacemaker     Metronic    Pneumonia        Past Surgical History:   Procedure Laterality Date    APPENDECTOMY      CARDIAC PACEMAKER PLACEMENT  01/22/2015    CHOLECYSTECTOMY      COLONOSCOPY N/A 4/6/2017    Procedure: COLONOSCOPY;  Surgeon: Tye Enamorado MD;  [facility];  Service: Endoscopy;  Laterality: N/A;    CORONARY ANGIOPLASTY  02/2014    CORONARY STENT PLACEMENT  02/05/2014    GASTRIC BYPASS      HERNIA REPAIR      TONSILLECTOMY      TOTAL KNEE ARTHROPLASTY Bilateral        Family History   Problem Relation Age of Onset    Heart disease Mother     Hypertension Mother     Stomach cancer Father      "ulcers that turned to cancer"    Pancreatic cancer Sister     Leukemia Brother      "leukemia which led to intestinal cancer"       Social History     Social History    Marital status:      Spouse name: N/A    Number of children: N/A    Years of education: N/A     Social History Main Topics    Smoking status: Former Smoker    Smokeless tobacco: None    Alcohol use No    Drug use: No    Sexual activity: Not Asked     Other Topics Concern    None     Social History Narrative       Current Outpatient Prescriptions   Medication Sig Dispense Refill    albuterol 90 mcg/actuation inhaler Inhale 1-2 puffs into the lungs every 6 (six) hours as needed for Wheezing. Rescue 18 g 0    allopurinol (ZYLOPRIM) 100 MG tablet TAKE 2 " TABLETS BY MOUTH DAILY 180 tablet 0    alprazolam (XANAX) 0.25 MG tablet Take 1 tablet (0.25 mg total) by mouth 2 (two) times daily as needed for Anxiety. 60 tablet 5    aspirin (ECOTRIN) 81 MG EC tablet Take 81 mg by mouth once daily.       clopidogrel (PLAVIX) 75 mg tablet Take 1 tablet (75 mg total) by mouth once daily. 90 tablet 3    COLCRYS 0.6 mg tablet TAKE 1 TABLET(0.6 MG) BY MOUTH EVERY DAY 30 tablet 3    ergocalciferol, vitamin D2, 400 unit Tab Take by mouth daily.      fluticasone (FLONASE) 50 mcg/actuation nasal spray 2 sprays by Each Nare route once daily.      gabapentin (NEURONTIN) 100 MG capsule Take 100 mg by mouth 3 (three) times daily.      hydrochlorothiazide (HYDRODIURIL) 12.5 MG Tab TAKE 1 TABLET BY MOUTH ONCE DAILY 90 tablet 1    hydrocodone-acetaminophen 5-325mg (NORCO) 5-325 mg per tablet Take 1 tablet by mouth every 4 (four) hours as needed. 30 tablet 0    inhalation spacing device (VisualXcriptEFLO AEROCHAMBER) Use as directed for inhalation. 1 Device 0    isosorbide mononitrate (IMDUR) 30 MG 24 hr tablet TAKE 1 TABLET BY MOUTH EVERY DAY 30 tablet 6    levothyroxine (SYNTHROID) 75 MCG tablet TAKE 1 TABLET(75 MCG) BY MOUTH BEFORE BREAKFAST 90 tablet 1    meloxicam (MOBIC) 7.5 MG tablet TAKE 1 TABLET BY MOUTH ONCE DAILY AS NEEDED FOR PAIN 90 tablet 0    nitroglycerin 400 mcg/spray SprA Place onto the tongue.      ondansetron (ZOFRAN-ODT) 8 MG TbDL Take 1 tablet (8 mg total) by mouth every 8 (eight) hours as needed (Nausea). 10 tablet 0    rosuvastatin (CRESTOR) 10 MG tablet Take 1 tablet (10 mg total) by mouth once daily. 30 tablet 6    sertraline (ZOLOFT) 100 MG tablet Take 1 tablet (100 mg total) by mouth once daily. 90 tablet 3    turmeric root extract 500 mg Cap Take 500 mg by mouth 2 (two) times daily.      verapamil (CALAN-SR) 120 MG CR tablet TAKE 1/2 TABLET BY MOUTH NIGHTLY 30 tablet 6    ZINC ACETATE ORAL 250 mg.       No current facility-administered medications for this  visit.        Review of patient's allergies indicates:   Allergen Reactions    Corticosteroids (glucocorticoids) Nausea Only and Other (See Comments)     Stomach pain, dizziness, headache    Oxycodone Other (See Comments)     Blood pressure dropped       Review of Systems   Constitutional: Negative for chills and fever.   HENT: Negative for congestion.    Eyes: Negative for visual disturbance.   Respiratory: Negative for cough and shortness of breath.    Cardiovascular: Negative for chest pain, palpitations and leg swelling.   Gastrointestinal: Negative for abdominal distention, abdominal pain, constipation, diarrhea, nausea and vomiting.   Endocrine: Negative for polyuria.   Genitourinary: Negative for dysuria.   Skin: Negative for rash.   Neurological: Negative for dizziness and light-headedness.   Hematological: Negative for adenopathy.       Objective:      Physical Exam   Constitutional: She is oriented to person, place, and time. She appears well-developed and well-nourished. No distress.   HENT:   Head: Normocephalic and atraumatic.   Eyes: EOM are normal.   Neck: Neck supple.   Cardiovascular: Normal rate and regular rhythm.    Pulmonary/Chest: Effort normal and breath sounds normal.   Left chest pacemaker   Abdominal: Soft. Bowel sounds are normal. She exhibits no distension. There is no tenderness.   Incision healing well staples intact, no signs of infection   Neurological: She is alert and oriented to person, place, and time.   Skin: Skin is warm and dry.   Vitals reviewed.      FINAL PATHOLOGIC DIAGNOSIS  1. COLON MASS, PARTIAL COLECTOMY- DIFFUSE LARGE B-CELL LYMPHOMA, GERMINAL CENTER  ORIGIN.  Assessment:   S/p right hemicolectomy with B-cell lymphoma  Plan:       -Port placement 5/15/17  -risks and benefits discussed with patient including: pain, bleeding, infection, injury to vessels, pneumothorax

## 2017-05-10 NOTE — LETTER
May 10, 2017      Da Matson MD  [address] [facility] [address]  [facility] [address]           [facility] General Surgery  [address] [place] [id number]  Phone: [phone]  Fax: [phone]          Patient: Violet Swenson   MR Number: [id number]   YOB: 1940   Date of Visit: 5/10/2017       Dear Dr. Da Matson:    Thank you for referring Violet Swenson to me for evaluation. Attached you will find relevant portions of my assessment and plan of care.    If you have questions, please do not hesitate to call me. I look forward to following Violet Swenson along with you.    Sincerely,    Orlando Moulton MD    Enclosure  CC:  No Recipients    If you would like to receive this communication electronically, please contact [email or web address] or [phone] to request more information on [employer] access.    For providers and/or their staff who would like to refer a patient to [facility], please contact us through our one-stop-shop provider referral line, [facility], at [phone].    If you feel you have received this communication in error or would no longer like to receive these types of communications, please e-mail [email or web address]

## 2017-05-10 NOTE — MR AVS SNAPSHOT
[facility] - General Surgery  [address] [address]  Phone: [phone]  Fax: [phone]                  Violet Swenson   5/10/2017 11:00 AM   Office Visit    Description:  Female : 1940   Provider:  Orlando Moulton MD   Department:  [facility] - General Surgery           Reason for Visit     Post-op Evaluation     Follow-up           Diagnoses this Visit        Comments    Lymphoma, unspecified body region, unspecified lymphoma type    -  Primary     Diffuse large B-cell lymphoma of intra-abdominal lymph nodes                To Do List           Future Appointments        Provider Department Dept Phone    2017 11:00 AM [facility] NM1 [facility] [phone]    2017 1:15 PM Nader Gil MD [facility] [phone]    2017 9:30 AM LABORATORY, [address] [facility] [phone]    2017 10:20 AM Da Matson MD [facility] Hematology Oncology [phone]    2017 1:00 PM Da Matson MD [facility] - Hemotology Oncology [phone]      Your Future Surgeries/Procedures     May 15, 2017   Surgery with Orlando Moulton MD   [facility])    [address] [address] [phone]              Goals (5 Years of Data)     None      [facility]     [facility] Nurse Care Line - 24/ Assistance  Unless otherwise directed by your provider, please contact [facility], our nurse care line that is available for / assistance.     Registered nurses in the [facility] provide: appointment scheduling, clinical advisement, health education, and other advisory services.  Call: [phone] (toll free)               Medications           Message regarding Medications     Verify the changes and/or additions to your medication regime listed below are the same as discussed with your clinician today.  If any of these changes  or additions are incorrect, please notify your healthcare provider.             Verify that the below list of medications is an accurate representation of the medications you are currently taking.  If none reported, the list may be blank. If incorrect, please contact your healthcare provider. Carry this list with you in case of emergency.           Current Medications     albuterol 90 mcg/actuation inhaler Inhale 1-2 puffs into the lungs every 6 (six) hours as needed for Wheezing. Rescue    allopurinol (ZYLOPRIM) 100 MG tablet TAKE 2 TABLETS BY MOUTH DAILY    alprazolam (XANAX) 0.25 MG tablet Take 1 tablet (0.25 mg total) by mouth 2 (two) times daily as needed for Anxiety.    aspirin (ECOTRIN) 81 MG EC tablet Take 81 mg by mouth once daily.     clopidogrel (PLAVIX) 75 mg tablet Take 1 tablet (75 mg total) by mouth once daily.    COLCRYS 0.6 mg tablet TAKE 1 TABLET(0.6 MG) BY MOUTH EVERY DAY    ergocalciferol, vitamin D2, 400 unit Tab Take by mouth daily.    fluticasone (FLONASE) 50 mcg/actuation nasal spray 2 sprays by Each Nare route once daily.    gabapentin (NEURONTIN) 100 MG capsule Take 100 mg by mouth 3 (three) times daily.    hydrochlorothiazide (HYDRODIURIL) 12.5 MG Tab TAKE 1 TABLET BY MOUTH ONCE DAILY    hydrocodone-acetaminophen 5-325mg (NORCO) 5-325 mg per tablet Take 1 tablet by mouth every 4 (four) hours as needed.    inhalation spacing device (RITEFLO AEROCHAMBER) Use as directed for inhalation.    isosorbide mononitrate (IMDUR) 30 MG 24 hr tablet TAKE 1 TABLET BY MOUTH EVERY DAY    levothyroxine (SYNTHROID) 75 MCG tablet TAKE 1 TABLET(75 MCG) BY MOUTH BEFORE BREAKFAST    meloxicam (MOBIC) 7.5 MG tablet TAKE 1 TABLET BY MOUTH ONCE DAILY AS NEEDED FOR PAIN    nitroglycerin 400 mcg/spray SprA Place onto the tongue.    ondansetron (ZOFRAN-ODT) 8 MG TbDL Take 1 tablet (8 mg total) by mouth every 8 (eight) hours as needed (Nausea).    rosuvastatin (CRESTOR) 10 MG tablet Take 1 tablet (10 mg total) by mouth  once daily.    sertraline (ZOLOFT) 100 MG tablet Take 1 tablet (100 mg total) by mouth once daily.    turmeric root extract 500 mg Cap Take 500 mg by mouth 2 (two) times daily.    verapamil (CALAN-SR) 120 MG CR tablet TAKE 1/2 TABLET BY MOUTH NIGHTLY    ZINC ACETATE ORAL 250 mg.           Clinical Reference Information           Your Vitals Were     BP Pulse Temp Weight BMI    137/83 81 98.1 °F (36.7 °C) (Oral) 78.8 kg (173 lb 11.6 oz) 29.82 kg/m2      Blood Pressure          Most Recent Value    BP  137/83      Allergies as of 5/10/2017     Corticosteroids (Glucocorticoids)    Oxycodone      Immunizations Administered on Date of Encounter - 5/10/2017     None      Orders Placed During Today's Visit      Normal Orders This Visit    Case Request Operating Room: [id number]       [facility] Sign-Up     Activating your [facility] account is as easy as 1-2-3!     1) Visit [email or web address], select Sign Up Now, enter this activation code and your date of birth, then select Next.  Activation code not generated  Current Patient Portal Status: Account disabled      2) Create a username and password to use when you visit [facility] in the future and select a security question in case you lose your password and select Next.    3) Enter your e-mail address and click Sign Up!    Additional Information  If you have questions, please e-mail [email or web address] or call [phone] to talk to our [facility] staff. Remember, [facility] is NOT to be used for urgent needs. For medical emergencies, dial 911.         Instructions    [facility]  DIET RECOMMENDATIONS    Fruits:  Use all fruits and juices liberally; fresh, cooked, dried or canned. Eat fruit raw and with skins when possible. Have at least four servings of fruit daily including a citrus fruit and a stewed dried fruit. Hard seeds of fruits (berries, figs, Grapes, mangoes, tomatoes) etc. may be removed.    Vegetables: Use all vegetables liberally. Green  leafy vegetables, such as cabbage, spinach, lettuce, broccoli, and other greens are particularly good.    Potato: As desired. Serve baked frequently and eat the skin. Other starchy foods such as rice, macaroni, etc., may be occasionally substituted. Chew popcorn well and do not eat hard kernels.    Meat, Fish, Poultry: One or two servings daily.    Eggs: One daily if you are not on a low cholesterol diet.    Milk: Include at least one-half pint daily. Whole milk or skimmed may be used.     Cereals: Use whole grain breads and cereals such as bran, bran flakes, grape nut flakes, puffed wheat, oatmeal, Wheaties, etc., as much as possible. Refined breaks and cereals are not restricted; however, they do not contain the bulk necessary for this diet.     Sugars, Sweets: Use very moderately. Depend on fruit as dessert.    Fats: Use butter or margarine as desired. Oil or dressing on salads as desired. Fried foods may be used in moderation. Nuts may be eaten if you chew them well and may be ground or finely chopped for cooking.   Sample Menu                                                                                 Breakfast                          Lunch  Orange juice, 4 ounces                                                Vegetable soup                    Stewed fruit                                                                 Fresh fruit plate with cottage cheese  Shredded wheat                                                           Whole wheat toast  Scrambled eggs                                                           Butter  Whole wheat toast                                                       Coffee or tea  Dinner                                                                         Bedtime  Large glass tomato juice                                             1 glass milk  Roast beef                                                                   stewed fruit  Baked potato with skin  Buttered  spinach  Raw vegetable salad  Baked apple with skin   Coffee or tea      [facility]  High Fiber Diet    15 grams of fiber per day is recommended  Fiber cereal = 5 grams (Raisin Bran, Shredded Wheat, Grape Nuts)  Konsyl 1 teaspoon = 6 grams  Metamucil 1 tablespoon = 3 grams  Citrucel 1 tablespoon = 2 grams  Fiber Choice = 3-4 per day    This diet furnishes adequate amounts of all the essential nutrients needed by the body and a very liberal fiber or roughage content. Roughage is indigestible fiber found in fruits, vegetables and whole grain cereals. It provides bulk to the large intestine and, accompanied by an adequate fluid intake, is a stimulant to elimination. Regular eating and elimination habits are vital to good health.     How to Increase Your Fiber Intake    1. Drink at least 4-5 glasses of liquids per day or the fiber can be constipating rather then stimulating to your gut.  2. Boil and bake potatoes in their skin. Eat the skin, too.  3. Include fresh fruits and raw vegetables in your daily diet. Raw fruits and vegetables have more useful fiber than those that have been peeled, cooked, pureed, or otherwise processed.  4. Eat a wide variety of fibrous foods in reasonable amounts. Increase fiber intake slowly especially if you have been on a low-fiber diet.  5. Eat more legumes-peas, beans, soybeans.  6. For snacks, try dried fruit, whole wheat and rye crackers.  7. Avoid instant-cook hot cereals. Use the longer cooking cereals.  8. Use bran whenever possible. Sprinkle it on top of cereal, mix it into mashed potatoes or hamburger meat, or use it in combination dishes such as meat loaf.   9. Substitute whole grain, whole wheat and bran products for white flour products.  10. Eat slowly and chew your food thoroughly.           Language Assistance Services     ATTENTION: Language assistance services are available, free of charge. Please call [phone].      ATENCIÓN: yoan [place] Real  castle disposición servicios gratuitos de asistencia lingüística. Llsavanna al [phone].     JIM Ý: N?u b?n nói Ti?ng Vi?t, có các d?ch v? h? tr? ngôn ng? mi?n phí dành cho b?n. G?i s? [phone].         [facility] complies with applicable Federal civil rights laws and does not discriminate on the basis of race, color, national origin, age, disability, or sex.

## 2017-05-11 DIAGNOSIS — I25.110 CORONARY ARTERY DISEASE INVOLVING NATIVE CORONARY ARTERY OF NATIVE HEART WITH UNSTABLE ANGINA PECTORIS: Primary | ICD-10-CM

## 2017-05-11 RX ORDER — ISOSORBIDE MONONITRATE 30 MG/1
30 TABLET, EXTENDED RELEASE ORAL DAILY
Qty: 90 TABLET | Refills: 3 | Status: SHIPPED | OUTPATIENT
Start: 2017-05-11 | End: 2017-11-01 | Stop reason: SDUPTHER

## 2017-05-11 NOTE — PRE ADMISSION SCREENING
Pre op instructions reviewed with patient per phone:    To confirm, Your surgeon has instructed you:  Surgery is scheduled 5/15/2017 at 1300.      Please report to [facility] main lobby by 1130.      INSTRUCTIONS IMPORTANT!!!  ¨ Do not eat, drink, or smoke after 12 midnight-including water. OK to brush teeth, no gum, candy or mints!    ¨ Take only these medicines with a small swallow of water-morning of surgery.  Albuterol inhaler, Imdur, synthroid    ____  Do not wear makeup, including mascara.  ____  No powder, lotions or creams to surgical area.  ____  Please remove all jewelry, including piercings and leave at home.  ____  No money or valuables needed. Please leave at home.  ____  Please bring identification and insurance information to hospital.  ____  If going home the same day, arrange for a ride home. You will not be able to   drive if Anesthesia was used.  ____  Children, under 12 years old, must remain in the waiting room with an adult.  They are not allowed in patient areas.  ____  Wear loose fitting clothing. Allow for dressings, bandages.  ____  Stop Aspirin, Ibuprofen, Motrin and Aleve at least 5-7 days before surgery, unless otherwise instructed by your doctor, or the nurse.   You MAY use Tylenol/acetaminophen until day of surgery.  ____  If you take diabetic medication, do not take am of surgery unless instructed by   Doctor.  ____ Stop taking any Fish Oil supplement or any Vitamins that contain Vitamin E at least 5 days prior to surgery.          Bathing Instructions-- The night before surgery and the morning prior to coming to the hospital:   -Do not shave the surgical area.   -Shower and wash your hair and body as usual with anti-bacterial  soap and shampoo.   -Rinse your hair and body completely.   -Use one packet of hibiclens to wash the surgical site (using your hand) gently for 5 minutes.  Do not scrub you skin too hard.   -Do not use hibiclens on your head,  face, or genitals.   -Do not wash with anti-bacterial soap after you use the hibiclens.   -Rinse your body thoroughly.   -Dry with clean, soft towel.  Do not use lotion, cream, deodorant, or powders on   the surgical site.    Use antibacterial soap in place of hibiclens if your surgery is on the head, face or genitals.         Surgical Site Infection    Prevention of surgical site infections:     -Keep incisions clean and dry.   -Do not soak/submerge incisions in water until completely healed.   -Do not apply lotions, powders, creams, or deodorants to site.   -Always make sure hands are cleaned with antibacterial soap/ alcohol-based   prior to touching the surgical site.  (This includes doctors, nurses, staff, and yourself.)    Signs and symptoms:   -Redness and pain around the area where you had surgery   -Drainage of cloudy fluid from your surgical wound   -Fever over 100.4  I have read or had read and explained to me, and understand the above information.

## 2017-05-12 ENCOUNTER — HOSPITAL ENCOUNTER (OUTPATIENT)
Dept: RADIOLOGY | Facility: HOSPITAL | Age: 77
Discharge: HOME | End: 2017-05-12
Attending: INTERNAL MEDICINE
Payer: MEDICARE

## 2017-05-12 DIAGNOSIS — I25.5 ISCHEMIC CARDIOMYOPATHY: ICD-10-CM

## 2017-05-12 DIAGNOSIS — C85.80 LYMPHOMA MALIGNANT, LARGE CELL: ICD-10-CM

## 2017-05-12 PROCEDURE — 78472 GATED HEART PLANAR SINGLE: CPT | Mod: 26,,, | Performed by: RADIOLOGY

## 2017-05-12 PROCEDURE — 78472 GATED HEART PLANAR SINGLE: CPT | Mod: TC,PO

## 2017-05-13 PROCEDURE — 99495 TRANSJ CARE MGMT MOD F2F 14D: CPT | Mod: S$GLB,,, | Performed by: FAMILY MEDICINE

## 2017-05-14 ENCOUNTER — ANESTHESIA EVENT (OUTPATIENT)
Dept: SURGERY | Facility: HOSPITAL | Age: 77
End: 2017-05-14
Payer: MEDICARE

## 2017-05-15 ENCOUNTER — HOSPITAL ENCOUNTER (OUTPATIENT)
Facility: HOSPITAL | Age: 77
Discharge: HOME | End: 2017-05-15
Attending: PATHOLOGY | Admitting: SURGERY
Payer: MEDICARE

## 2017-05-15 ENCOUNTER — ANESTHESIA (OUTPATIENT)
Dept: SURGERY | Facility: HOSPITAL | Age: 77
End: 2017-05-15
Payer: MEDICARE

## 2017-05-15 VITALS
HEIGHT: 64 IN | RESPIRATION RATE: 19 BRPM | OXYGEN SATURATION: 97 % | BODY MASS INDEX: 29.06 KG/M2 | SYSTOLIC BLOOD PRESSURE: 160 MMHG | TEMPERATURE: 98 F | DIASTOLIC BLOOD PRESSURE: 74 MMHG | HEART RATE: 78 BPM | WEIGHT: 170.19 LBS

## 2017-05-15 DIAGNOSIS — C85.90 LYMPHOMA, UNSPECIFIED BODY REGION, UNSPECIFIED LYMPHOMA TYPE: ICD-10-CM

## 2017-05-15 DIAGNOSIS — C85.90 LYMPHOMA: ICD-10-CM

## 2017-05-15 PROCEDURE — 88264 CHROMOSOME ANALYSIS 20-25: CPT

## 2017-05-15 PROCEDURE — 88342 IMHCHEM/IMCYTCHM 1ST ANTB: CPT | Mod: 26,59,, | Performed by: PATHOLOGY

## 2017-05-15 PROCEDURE — 88184 FLOWCYTOMETRY/ TC 1 MARKER: CPT | Performed by: PATHOLOGY

## 2017-05-15 PROCEDURE — 88313 SPECIAL STAINS GROUP 2: CPT | Mod: 26,,, | Performed by: PATHOLOGY

## 2017-05-15 PROCEDURE — 88313 SPECIAL STAINS GROUP 2: CPT

## 2017-05-15 PROCEDURE — 88305 TISSUE EXAM BY PATHOLOGIST: CPT | Mod: 26,,, | Performed by: PATHOLOGY

## 2017-05-15 PROCEDURE — 37000008 HC ANESTHESIA 1ST 15 MINUTES: Performed by: SURGERY

## 2017-05-15 PROCEDURE — 36000706: Performed by: SURGERY

## 2017-05-15 PROCEDURE — 88189 FLOWCYTOMETRY/READ 16 & >: CPT | Mod: ,,, | Performed by: PATHOLOGY

## 2017-05-15 PROCEDURE — 88237 TISSUE CULTURE BONE MARROW: CPT

## 2017-05-15 PROCEDURE — 25000003 PHARM REV CODE 250: Performed by: ANESTHESIOLOGY

## 2017-05-15 PROCEDURE — 85097 BONE MARROW INTERPRETATION: CPT | Mod: ,,, | Performed by: PATHOLOGY

## 2017-05-15 PROCEDURE — 36561 INSERT TUNNELED CV CATH: CPT | Mod: 79,,, | Performed by: SURGERY

## 2017-05-15 PROCEDURE — 71000039 HC RECOVERY, EACH ADD'L HOUR: Performed by: SURGERY

## 2017-05-15 PROCEDURE — 88185 FLOWCYTOMETRY/TC ADD-ON: CPT | Mod: 59 | Performed by: PATHOLOGY

## 2017-05-15 PROCEDURE — 88311 DECALCIFY TISSUE: CPT | Mod: 26,,, | Performed by: PATHOLOGY

## 2017-05-15 PROCEDURE — 25000003 PHARM REV CODE 250: Performed by: SURGERY

## 2017-05-15 PROCEDURE — 63600175 PHARM REV CODE 636 W HCPCS: Performed by: NURSE ANESTHETIST, CERTIFIED REGISTERED

## 2017-05-15 PROCEDURE — 88341 IMHCHEM/IMCYTCHM EA ADD ANTB: CPT | Mod: 26,59,, | Performed by: PATHOLOGY

## 2017-05-15 PROCEDURE — 63600175 PHARM REV CODE 636 W HCPCS: Performed by: SURGERY

## 2017-05-15 PROCEDURE — 71000033 HC RECOVERY, INTIAL HOUR: Performed by: SURGERY

## 2017-05-15 PROCEDURE — 36000707: Performed by: SURGERY

## 2017-05-15 PROCEDURE — 77001 FLUOROGUIDE FOR VEIN DEVICE: CPT | Mod: 26,,, | Performed by: SURGERY

## 2017-05-15 PROCEDURE — 71000015 HC POSTOP RECOV 1ST HR: Performed by: SURGERY

## 2017-05-15 PROCEDURE — 88305 TISSUE EXAM BY PATHOLOGIST: CPT | Performed by: PATHOLOGY

## 2017-05-15 PROCEDURE — 37000009 HC ANESTHESIA EA ADD 15 MINS: Performed by: SURGERY

## 2017-05-15 PROCEDURE — C1788 PORT, INDWELLING, IMP: HCPCS | Performed by: SURGERY

## 2017-05-15 PROCEDURE — 63600175 PHARM REV CODE 636 W HCPCS: Performed by: ANESTHESIOLOGY

## 2017-05-15 DEVICE — PORT POWER CLEAR VIEW: Type: IMPLANTABLE DEVICE | Site: SUBCLAVIAN | Status: FUNCTIONAL

## 2017-05-15 RX ORDER — MORPHINE SULFATE 10 MG/ML
2 INJECTION INTRAMUSCULAR; INTRAVENOUS; SUBCUTANEOUS EVERY 5 MIN PRN
Status: COMPLETED | OUTPATIENT
Start: 2017-05-15 | End: 2017-05-15

## 2017-05-15 RX ORDER — PROPOFOL 10 MG/ML
VIAL (ML) INTRAVENOUS CONTINUOUS PRN
Status: DISCONTINUED | OUTPATIENT
Start: 2017-05-15 | End: 2017-05-15

## 2017-05-15 RX ORDER — MEPERIDINE HYDROCHLORIDE 50 MG/ML
12.5 INJECTION INTRAMUSCULAR; INTRAVENOUS; SUBCUTANEOUS ONCE AS NEEDED
Status: DISCONTINUED | OUTPATIENT
Start: 2017-05-15 | End: 2017-05-15 | Stop reason: HOSPADM

## 2017-05-15 RX ORDER — HYDROCODONE BITARTRATE AND ACETAMINOPHEN 5; 325 MG/1; MG/1
1 TABLET ORAL EVERY 4 HOURS PRN
Status: CANCELLED | OUTPATIENT
Start: 2017-05-15

## 2017-05-15 RX ORDER — SODIUM CHLORIDE 9 MG/ML
3 INJECTION, SOLUTION INTRAMUSCULAR; INTRAVENOUS; SUBCUTANEOUS
Status: DISCONTINUED | OUTPATIENT
Start: 2017-05-15 | End: 2017-05-15 | Stop reason: HOSPADM

## 2017-05-15 RX ORDER — OXYCODONE HYDROCHLORIDE 5 MG/1
5 TABLET ORAL
Status: DISCONTINUED | OUTPATIENT
Start: 2017-05-15 | End: 2017-05-15 | Stop reason: HOSPADM

## 2017-05-15 RX ORDER — LIDOCAINE HYDROCHLORIDE 10 MG/ML
INJECTION, SOLUTION EPIDURAL; INFILTRATION; INTRACAUDAL; PERINEURAL
Status: DISCONTINUED | OUTPATIENT
Start: 1840-12-31 | End: 2017-05-15 | Stop reason: HOSPADM

## 2017-05-15 RX ORDER — BUPIVACAINE HYDROCHLORIDE 2.5 MG/ML
INJECTION, SOLUTION EPIDURAL; INFILTRATION; INTRACAUDAL
Status: DISCONTINUED | OUTPATIENT
Start: 2017-05-15 | End: 2017-05-15 | Stop reason: HOSPADM

## 2017-05-15 RX ORDER — SODIUM CHLORIDE 9 MG/ML
INJECTION, SOLUTION INTRAVENOUS CONTINUOUS
Status: DISCONTINUED | OUTPATIENT
Start: 2017-05-15 | End: 2017-05-15 | Stop reason: HOSPADM

## 2017-05-15 RX ORDER — SODIUM CHLORIDE, SODIUM LACTATE, POTASSIUM CHLORIDE, CALCIUM CHLORIDE 600; 310; 30; 20 MG/100ML; MG/100ML; MG/100ML; MG/100ML
INJECTION, SOLUTION INTRAVENOUS CONTINUOUS
Status: DISCONTINUED | OUTPATIENT
Start: 2017-05-15 | End: 2017-05-15 | Stop reason: HOSPADM

## 2017-05-15 RX ORDER — LIDOCAINE HYDROCHLORIDE 10 MG/ML
INJECTION INFILTRATION; PERINEURAL
Status: DISCONTINUED | OUTPATIENT
Start: 2017-05-15 | End: 2017-05-15 | Stop reason: HOSPADM

## 2017-05-15 RX ORDER — MORPHINE SULFATE 10 MG/ML
2 INJECTION INTRAMUSCULAR; INTRAVENOUS; SUBCUTANEOUS EVERY 4 HOURS PRN
Status: CANCELLED | OUTPATIENT
Start: 2017-05-15

## 2017-05-15 RX ORDER — MIDAZOLAM HYDROCHLORIDE 1 MG/ML
INJECTION, SOLUTION INTRAMUSCULAR; INTRAVENOUS
Status: DISCONTINUED | OUTPATIENT
Start: 2017-05-15 | End: 2017-05-15

## 2017-05-15 RX ORDER — CEFAZOLIN SODIUM 2 G/50ML
2 SOLUTION INTRAVENOUS
Status: COMPLETED | OUTPATIENT
Start: 2017-05-15 | End: 2017-05-15

## 2017-05-15 RX ORDER — AMOXICILLIN 250 MG
1 CAPSULE ORAL 2 TIMES DAILY
COMMUNITY
Start: 2017-05-15 | End: 2017-05-18

## 2017-05-15 RX ORDER — ONDANSETRON 2 MG/ML
4 INJECTION INTRAMUSCULAR; INTRAVENOUS EVERY 12 HOURS PRN
Status: CANCELLED | OUTPATIENT
Start: 2017-05-15

## 2017-05-15 RX ORDER — HEPARIN 100 UNIT/ML
SYRINGE INTRAVENOUS
Status: DISCONTINUED | OUTPATIENT
Start: 2017-05-15 | End: 2017-05-15 | Stop reason: HOSPADM

## 2017-05-15 RX ORDER — HYDROCODONE BITARTRATE AND ACETAMINOPHEN 5; 325 MG/1; MG/1
1 TABLET ORAL EVERY 6 HOURS PRN
Qty: 10 TABLET | Refills: 0 | Status: SHIPPED | OUTPATIENT
Start: 2017-05-15 | End: 2017-06-01 | Stop reason: CLARIF

## 2017-05-15 RX ADMIN — MORPHINE SULFATE 2 MG: 10 INJECTION, SOLUTION INTRAMUSCULAR; INTRAVENOUS at 03:05

## 2017-05-15 RX ADMIN — MORPHINE SULFATE 2 MG: 10 INJECTION, SOLUTION INTRAMUSCULAR; INTRAVENOUS at 02:05

## 2017-05-15 RX ADMIN — CEFAZOLIN SODIUM 2000 MG: 2 SOLUTION INTRAVENOUS at 01:05

## 2017-05-15 RX ADMIN — PROPOFOL 775 MG/HR: 10 INJECTION, EMULSION INTRAVENOUS at 01:05

## 2017-05-15 RX ADMIN — MIDAZOLAM HYDROCHLORIDE 2 MG: 1 INJECTION, SOLUTION INTRAMUSCULAR; INTRAVENOUS at 01:05

## 2017-05-15 RX ADMIN — SODIUM CHLORIDE, SODIUM LACTATE, POTASSIUM CHLORIDE, AND CALCIUM CHLORIDE: 600; 310; 30; 20 INJECTION, SOLUTION INTRAVENOUS at 01:05

## 2017-05-15 NOTE — IP AVS SNAPSHOT
[facility]  [id number] [place] [address] [address] [place] [id number]           Patient Discharge Instructions   Our goal is to set you up for success. This packet includes information on your condition, medications, and your home care.  It will help you care for yourself to prevent having to return to the hospital.     Please ask your nurse if you have any questions.      There are many details to remember when preparing to leave the hospital. Here is what you will need to do:    1. Take your medicine. If you are prescribed medications, review your Medication List on the following pages. You may have new medications to  at the pharmacy and others that you'll need to stop taking. Review the instructions for how and when to take your medications. Talk with your doctor or nurses if you are unsure of what to do.     2. Go to your follow-up appointments. Specific follow-up information is listed in the following pages. Your may be contacted by a nurse or clinical provider about future appointments. Be sure we have all of the phone numbers to reach you. Please contact your provider's office if you are unable to make an appointment.     3. Watch for warning signs. Your doctor or nurse will give you detailed warning signs to watch for and when to call for assistance. These instructions may also include educational information about your condition. If you experience any of warning signs to your health, call your doctor.               ** Verify the list of medication(s) below is accurate and up to date. Carry this with you in case of emergency. If your medications have changed, please notify your healthcare provider.             Medication List      START taking these medications        Additional Info                      senna-docusate 8.6-50 mg 8.6-50 mg per tablet   Commonly known as:  PERICOLACE   Refills:  0   Dose:  1 tablet    Instructions:  Take 1 tablet by mouth 2 (two) times daily.     Begin Date     AM    Noon    PM    Bedtime         CHANGE how you take these medications        Additional Info                      * hydrocodone-acetaminophen 5-325mg 5-325 mg per tablet   Commonly known as:  NORCO   Quantity:  30 tablet   Refills:  0   Dose:  1 tablet   What changed:  Another medication with the same name was added. Make sure you understand how and when to take each.    Instructions:  Take 1 tablet by mouth every 4 (four) hours as needed.     Begin Date    AM    Noon    PM    Bedtime       * hydrocodone-acetaminophen 5-325mg 5-325 mg per tablet   Commonly known as:  NORCO   Quantity:  10 tablet   Refills:  0   Dose:  1 tablet   What changed:  You were already taking a medication with the same name, and this prescription was added. Make sure you understand how and when to take each.    Instructions:  Take 1 tablet by mouth every 6 (six) hours as needed for Pain.     Begin Date    AM    Noon    PM    Bedtime       * Notice:  This list has 2 medication(s) that are the same as other medications prescribed for you. Read the directions carefully, and ask your doctor or other care provider to review them with you.      CONTINUE taking these medications        Additional Info                      albuterol 90 mcg/actuation inhaler   Quantity:  18 g   Refills:  0   Dose:  1-2 puff    Instructions:  Inhale 1-2 puffs into the lungs every 6 (six) hours as needed for Wheezing. Rescue     Begin Date    AM    Noon    PM    Bedtime       allopurinol 100 MG tablet   Commonly known as:  ZYLOPRIM   Quantity:  180 tablet   Refills:  0    Instructions:  TAKE 2 TABLETS BY MOUTH DAILY     Begin Date    AM    Noon    PM    Bedtime       alprazolam 0.25 MG tablet   Commonly known as:  XANAX   Quantity:  60 tablet   Refills:  5   Dose:  0.25 mg    Instructions:  Take 1 tablet (0.25 mg total) by mouth 2 (two) times daily as needed for Anxiety.     Begin Date    AM    Noon    PM    Bedtime       aspirin 81 MG EC tablet   Commonly known  as:  ECOTRIN   Refills:  0   Dose:  81 mg    Instructions:  Take 81 mg by mouth once daily.     Begin Date    AM    Noon    PM    Bedtime       clopidogrel 75 mg tablet   Commonly known as:  PLAVIX   Quantity:  90 tablet   Refills:  3   Dose:  75 mg    Instructions:  Take 1 tablet (75 mg total) by mouth once daily.     Begin Date    AM    Noon    PM    Bedtime       COLCRYS 0.6 mg tablet   Quantity:  30 tablet   Refills:  3   Generic drug:  colchicine    Instructions:  TAKE 1 TABLET(0.6 MG) BY MOUTH EVERY DAY     Begin Date    AM    Noon    PM    Bedtime       ergocalciferol (vitamin D2) 400 unit Tab   Refills:  0    Instructions:  Take by mouth daily.     Begin Date    AM    Noon    PM    Bedtime       fluticasone 50 mcg/actuation nasal spray   Commonly known as:  FLONASE   Refills:  0   Dose:  2 spray    Instructions:  2 sprays by Each Nare route once daily.     Begin Date    AM    Noon    PM    Bedtime       gabapentin 100 MG capsule   Commonly known as:  NEURONTIN   Refills:  0   Dose:  100 mg    Instructions:  Take 100 mg by mouth 3 (three) times daily.     Begin Date    AM    Noon    PM    Bedtime       hydrochlorothiazide 12.5 MG Tab   Commonly known as:  HYDRODIURIL   Quantity:  90 tablet   Refills:  1   Comments:  **Patient requests 90 days supply**    Instructions:  TAKE 1 TABLET BY MOUTH ONCE DAILY     Begin Date    AM    Noon    PM    Bedtime       inhalation spacing device   Commonly known as:  RITEFLO AEROCHAMBER   Quantity:  1 Device   Refills:  0    Instructions:  Use as directed for inhalation.     Begin Date    AM    Noon    PM    Bedtime       isosorbide mononitrate 30 MG 24 hr tablet   Commonly known as:  IMDUR   Quantity:  90 tablet   Refills:  3   Dose:  30 mg    Instructions:  Take 1 tablet (30 mg total) by mouth once daily.     Begin Date    AM    Noon    PM    Bedtime       levothyroxine 75 MCG tablet   Commonly known as:  SYNTHROID   Quantity:  90 tablet   Refills:  1    Instructions:  TAKE  1 TABLET(75 MCG) BY MOUTH BEFORE BREAKFAST     Begin Date    AM    Noon    PM    Bedtime       meloxicam 7.5 MG tablet   Commonly known as:  MOBIC   Quantity:  90 tablet   Refills:  0    Instructions:  TAKE 1 TABLET BY MOUTH ONCE DAILY AS NEEDED FOR PAIN     Begin Date    AM    Noon    PM    Bedtime       nitroglycerin 400 mcg/spray   Commonly known as:  NITROMIST   Refills:  0    Instructions:  Place onto the tongue.     Begin Date    AM    Noon    PM    Bedtime       ondansetron 8 MG Tbdl   Commonly known as:  ZOFRAN-ODT   Quantity:  10 tablet   Refills:  0   Dose:  8 mg    Instructions:  Take 1 tablet (8 mg total) by mouth every 8 (eight) hours as needed (Nausea).     Begin Date    AM    Noon    PM    Bedtime       rosuvastatin 10 MG tablet   Commonly known as:  CRESTOR   Quantity:  30 tablet   Refills:  6   Dose:  10 mg    Instructions:  Take 1 tablet (10 mg total) by mouth once daily.     Begin Date    AM    Noon    PM    Bedtime       sertraline 100 MG tablet   Commonly known as:  ZOLOFT   Quantity:  90 tablet   Refills:  3   Dose:  100 mg   Comments:  **Patient requests 90 days supply**    Instructions:  Take 1 tablet (100 mg total) by mouth once daily.     Begin Date    AM    Noon    PM    Bedtime       turmeric root extract 500 mg Cap   Refills:  0   Dose:  500 mg    Instructions:  Take 500 mg by mouth 2 (two) times daily.     Begin Date    AM    Noon    PM    Bedtime       verapamil 120 MG CR tablet   Commonly known as:  CALAN-SR   Quantity:  30 tablet   Refills:  6    Instructions:  TAKE 1/2 TABLET BY MOUTH NIGHTLY     Begin Date    AM    Noon    PM    Bedtime       ZINC ACETATE ORAL   Refills:  0   Dose:  250 mg    Instructions:  250 mg.     Begin Date    AM    Noon    PM    Bedtime            Where to Get Your Medications      These medications were [facility] to [id number] - [place], [place] - [address]  [address] , [facility] [address]     Phone:   [phone]     hydrocodone-acetaminophen 5-325mg 5-325 mg per tablet         You can get these medications from any pharmacy     You don't need a prescription for these medications     senna-docusate 8.6-50 mg 8.6-50 mg per tablet                  Please bring to all follow up appointments:    1. A copy of your discharge instructions.  2. All medicines you are currently taking in their original bottles.  3. Identification and insurance card.    Please arrive 15 minutes ahead of scheduled appointment time.    Please call 24 hours in advance if you must reschedule your appointment and/or time.        Your Scheduled Appointments     May 18, 2017  1:15 PM CDT   Consult with MD Ligia Patel - Cardiology ([facility])    [address] [place] [address] [id number] [phone]            May 19, 2017  9:30 AM CDT   Non-Fasting Lab with LABORATORY, [address] [facility] ([facility])    [address] [place] [address] [phone]            May 19, 2017 10:20 AM CDT   Established Patient Visit with MD Ligia Connelly - Hematology Oncology ([facility])    [address] [place] [address] [phone]            May 23, 2017  1:00 PM CDT   Established Patient Visit with Da Matson MD   [facility] - Hemotology Oncology ([facility])    [address] [facility] [phone]            May 31, 2017 10:20 AM CDT   Post OP with MD Ligia Rojas - General Surgery ([facility])    [address] [place] [address] [phone]              Follow-up Information     Follow up with Orlando Moulton MD.    Specialty:  General Surgery    Why:  As needed    Contact information:    [address] [place] [address] [address] [address]  [phone]          Discharge Instructions     Future Orders    Activity as tolerated     Call MD for:  difficulty breathing, headache or  visual disturbances     Call MD for:  extreme fatigue     Call MD for:  hives     Call MD for:  persistent dizziness or light-headedness     Call MD for:  persistent nausea and vomiting     Call MD for:  redness, tenderness, or signs of infection (pain, swelling, redness, odor or green/yellow discharge around incision site)     Call MD for:  severe uncontrolled pain     Call MD for:  temperature >100.4     Diet general     Questions:    Total calories:      Fat restriction, if any:      Protein restriction, if any:      Na restriction, if any:      Fluid restriction:      Additional restrictions:      Remove dressing in 48 hours     Shower on day dressing removed (No bath)         Discharge Instructions         Vascular Access Port Implantation   Port implantation is surgery to place (implant) a port under the skin. For vascular access, it is placed into a vein. The port allows medicines or nutrition to be sent right into your bloodstream. Blood can also be taken or given through the port. During the procedure, a long, thin tube called a catheter is threaded into one of your large veins. The tube is then attached to the port. This usually sits under the skin of your chest and causes a small bump. To use the port, a special needle is passed through your skin and into the port. The needle can stay in your skin for up to 7 days, if needed. A port can stay in place for weeks or months or longer.    Why is a vascular access port needed?  A vascular access port may allow healthcare providers to give you:  · Chemotherapy or other cancer-fighting drugs  · IV treatments, such as antibiotics or nutrition  · Hemodialysis (for kidney failure)  The port may also be used to draw blood.  Before the procedure  Follow any instructions you are given on how to prepare.  Tell your provider about any medicines you are taking. This includes:  · All prescription medicines  · Over-the-counter medicines such as aspirin or  ibuprofen  · Herbs, vitamins, and other supplements  Also be sure your provider knows:  · If you are pregnant or think you may be pregnant  · If you are allergic to any medicines or substances, especially local anesthetics or iodine  · Your full medical history, including why you will need the port  · If you plan on doing any contact sports  During the procedure  · Before the procedure, an IV may be put into a vein in your arm or hand. This gives you fluids and medicines. You may be given medicine through the IV to help you relax during the procedure. This is called sedation. But some surgeons place ports using general anesthesia.  · The chest is used most often for the port. In some cases, your belly (abdomen) or arm will be used instead.  · The skin over the insertion area is numbed with local anesthetic.  · Ultrasound or X-rays are used to help the healthcare provider guide the catheter into the proper location during the procedure.  · A cut (incision) is made in the skin where the port will be placed. A small pocket for the port is formed under the skin.  · A second small incision is made in the skin near the first incision. A tunnel under the skin is created. The catheter is put through the tunnel and into the blood vessel.  · The skin is closed over the port. It is held shut with stitches (sutures) or surgical glue or tape. The second small incision is also closed.  · A chest X-ray may be done to make sure the port is placed properly.  After the procedure  You may be taken to a recovery room where youll recover from the sedation. Nurses will check on you as you rest. If you have pain, nurses can give you medicine. If you are not staying in the hospital overnight, you will be sent home a few hours after the procedure is done. A healthcare provider will tell you when you can go home. An adult family member or friend will need to drive you home.  Recovering at home  · Take pain medicine as directed by  your healthcare provider.  · Take it easy for 24 hours after the procedure. Avoid physical activity and heavy lifting until your healthcare provider says its OK.  · Keep the port clean and dry. Ask when you can shower again. You will need to keep the port dry by covering it when you shower.  · Care for the insertion site as you are directed.  · Dont swim, bathe, or do other activities that cause water to cover the insertion site.  · To keep the port from getting blocked with blood clots, flush it as often as directed. You should be shown the proper way to flush the port before you go home. It is important to follow these directions.     Risks and possible complications of implantation  · Bleeding  · Infection of the insertion site  · Damage to a blood vessel  · Nerve injury or irritation  · Collapsed lung (for chest port placements)  · Skin breakdown over the port  Risks and possible complications of having a port  · Blocked  port or catheter  · Leakage or breakage of the port or catheter  · The port moves out of position  · Blood clot  · Skin or bloodstream infection  · Skin breakdown over the port      When to seek medical care  Call your healthcare provider right away if you have any of the following:  · A fever of 100.4°F (38.0°C) or higher  · You can't access or use the port properly  · You can't flush the port or get a blood return  · The skin near the port is red, warm, swollen, or broken  · You have shoulder pain on the side where the port is located  · You feel a heart flutter or racing heart   · Swollen arm, if the port is placed in your arm   Date Last Reviewed: 7/1/2016 © 2000-2016 [employer]. [address] [place] [address], [place] [address]. All rights reserved. This information is not intended as a substitute for professional medical care. Always follow your healthcare professional's instructions.        Bone Marrow Aspiration and Biopsy    Bone marrow is the soft, spongy part inside bones.  It makes most of the bodys blood cells. Aspiration and biopsy are procedures done to take a sample of bone marrow out of the body for examination. To perform either procedure, a needle is inserted into one of your bones, usually the back of the hip bone. Then a sample of bone marrow is removed.   If a sample of fluid and cells is taken, it is called bone marrow aspiration. If a solid sample of bone marrow tissue is removed, it is called bone marrow biopsy. In either case, the samples are sent to a lab and studied. The procedures can be done alone, but are most often done together. This sheet tells you more about what to expect.  Why the procedures are done  The procedures may be done for a number of reasons. They can help diagnose certain blood or bone marrow disorders or infections. They may help find certain cancers, such as leukemia. They can show if cancer in other areas of the body has spread to the bone marrow. They can be used during cancer treatment, such as chemotherapy, to monitor treatment progress. And they may be done before certain treatments, such as a stem cell transplant, which require a bone marrow sample. Your healthcare provider will explain why you need the procedure and answer any questions you have.  Preparing for the procedure  Prepare for the procedure as told. In addition:  · Tell your healthcare provider:  ¨ What medicines you take. This includes blood thinners, such as aspirin. This also includes over-the-counter medicines, herbs, and other supplements. You may need to stop taking some or all of them before the procedure.  ¨ If you are allergic to any medicines. Also mention if you have ever had a reaction to medicines used during other tests or procedures in the past.  ¨ If you have a history of bleeding problems.  ¨ If you are pregnant or may be pregnant.  · Follow any directions youre given for not eating or drinking before the procedure.  The day of the procedure  The procedures can  be done at a hospital, clinic, or healthcare providers office. They are performed by a healthcare provider or trained healthcare provider. Whether youre having one or both procedures, plan to be at the facility for 1 to 2 hours. Youll likely go home the same day.  Before the procedure begins  What to expect before the procedure:  · Youll change into a patient gown.  · An IV line may be put into a vein in your arm or hand. This line supplies fluids and medicines.  · Youll be given a sedative to help you relax, if needed. This medicine is given by pill, injection, or through the IV line.  During the procedure  What to expect during the procedure:  · Youll lie on your side or your stomach.  · The site to be used for the bone marrow samples is marked and cleaned. The most common site is the back of the hip bone. Less common sites include the front of the hip bone or breastbone.  · Numbing medicine (local anesthesia) is injected at the site.  · A small cut is made through the numbed skin.  · One or both procedures are then done. Be sure to lie still for each procedure. It is normal to feel some pressure or pain during each procedure.  ¨ For the bone marrow aspiration, a thin needle is put through the cut and into the bone. A syringe is then attached to the needle and used to remove a sample of bone marrow fluid and cells.  ¨ For the bone marrow biopsy, a different needle is put through the same cut and into the bone. A small amount of bone marrow tissue is then removed.  · The samples are sent to a lab to be evaluated.  · When the procedure is complete, pressure is applied to the site for 10 to 15 minutes to help stop bleeding. The site is then bandaged.  After the procedure  Most people can go home after a short period of observation. If you need it, youll be given medicine to manage pain. If you were given a sedative, you may be taken to a recovery room to rest until the medicine wears off. An adult family  member or friend must drive you home afterward.  Recovering at home  Once at home, follow any instructions youre given. Be sure to:  · Take all medicines as directed.  · Care for the procedure site as instructed.  · Check for signs of infection at the procedure site (see below).  · Avoid getting the procedure site wet. Do not bathe or shower until your healthcare providers say it is OK to do so. If you wish, you may wash with a sponge or washcloth.  · Avoid heavy lifting and other strenuous activities as directed.     Call the healthcare provider  Call your healthcare provider if you have any of the following:  · Fever of 100.4 ºF (38 ºC) or higher, or as directed by your healthcare provider  · Signs of infection at the procedure site, such as increased redness or swelling, warmth, worsening pain, bleeding, or foul-smelling drainage   Follow-up  Your healthcare provider will discuss the results with you when they are ready. This is usually within a week after the procedure.     Risks and possible complications of these procedures  These include:  · Severe bleeding or bruising at the procedure site  · Infection at the procedure site  · Bone fracture  · Bone infection   Date Last Reviewed: 10/8/2015  © 2505-8545 [employer]. [address] [place] [address]. All rights reserved. This information is not intended as a substitute for professional medical care. Always follow your healthcare professional's instructions.      General Information:    1.  Do not drink alcoholic beverages including beer for 24 hours or as long as you are on pain medication..  2.  Do not drive a motor vehicle, operate machinery or power tools, or signs legal papers for 24 hours or as long as you are on pain medication.   3.  You may experience light-headedness, dizziness, and sleepiness following surgery. Please do not stay alone. A responsible adult should be with you for this 24 hour period.  4.  Go home and  rest.    5. Progress slowly to a normal diet unless instructed.  Otherwise, begin with liquids such as soft drinks, then soup and crackers working up to solid foods. Drink plenty of nonalcoholic fluids.  6.  Certain anesthetics and pain medications produce nausea and vomiting in certain       individuals. If nausea becomes a problem at home, call you doctor.    7. A nurse will be calling you sometime after surgery. Do not be alarmed. This is our way of finding out how you are doing.    8. Several times every hour while you are awake, take 2-3 deep breaths and cough. If you had stomach surgery hold a pillow or rolled towel firmly against your stomach before you cough. This will help with any pain the cough might cause.  9. Several times every hour while you are awake, pump and flex your feet 5-6 times and do foot circles. This will help prevent blood clots.    10.Call your doctor for severe pain, bleeding, fever, or signs or symptoms of infection (pain, swelling, redness, foul odor, drainage).    11.You can contact your doctor anytime by [phone] for the [facility] (at [facility]) or [phone] for the [facility] on [facility].   [email or web address] is another way to contact your doctor if you are an active participant online with [facility].        Acetaminophen; Hydrocodone tablets or capsules  What is this medicine?  ACETAMINOPHEN; HYDROCODONE (a set a [place]ONIA jesus fen; shun droe KOE done) is a pain reliever. It is used to treat moderate to severe pain.  How should I use this medicine?  Take this medicine by mouth with a glass of water. Follow the directions on the prescription label. You can take it with or without food. If it upsets your stomach, take it with food. Do not take your medicine more often than directed.  A special MedGuide will be given to you by the pharmacist with each prescription and refill. Be sure to read this information carefully each time.  Talk to your pediatrician  regarding the use of this medicine in children. Special care may be needed.  What side effects may I notice from receiving this medicine?  Side effects that you should report to your doctor or health care professional as soon as possible:  · allergic reactions like skin rash, itching or hives, swelling of the face, lips, or tongue  · breathing problems  · confusion  · redness, blistering, peeling or loosening of the skin, including inside the mouth  · signs and symptoms of low blood pressure like dizziness; feeling faint or lightheaded, falls; unusually weak or tired  · trouble passing urine or change in the amount of urine  · yellowing of the eyes or skin  Side effects that usually do not require medical attention (report to your doctor or health care professional if they continue or are bothersome):  · constipation  · dry mouth  · nausea, vomiting  · tiredness  What may interact with this medicine?  This medicine may interact with the following medications:  · alcohol  · antiviral medicines for HIV or AIDS  · atropine  · antihistamines for allergy, cough and cold  · certain antibiotics like erythromycin, clarithromycin  · certain medicines for anxiety or sleep  · certain medicines for bladder problems like oxybutynin, tolterodine  · certain medicines for depression like amitriptyline, fluoxetine, sertraline  · certain medicines for fungal infections like ketoconazole and itraconazole  · certain medicines for Parkinson's disease like benztropine, trihexyphenidyl  · certain medicines for seizures like carbamazepine, phenobarbital, phenytoin, primidone  · certain medicines for stomach problems like dicyclomine, hyoscyamine  · certain medicines for travel sickness like scopolamine  · general anesthetics like halothane, isoflurane, methoxyflurane, propofol  · ipratropium  · local anesthetics like lidocaine, pramoxine, tetracaine  · MAOIs like Carbex, Eldepryl, Marplan, Nardil, and Parnate  · medicines that relax  muscles for surgery  · other medicines with acetaminophen  · other narcotic medicines for pain or cough  · phenothiazines like chlorpromazine, mesoridazine, prochlorperazine, thioridazine  · rifampin  What if I miss a dose?  If you miss a dose, take it as soon as you can. If it is almost time for your next dose, take only that dose. Do not take double or extra doses.  Where should I keep my medicine?  Keep out of the reach of children. This medicine can be abused. Keep your medicine in a safe place to protect it from theft. Do not share this medicine with anyone. Selling or giving away this medicine is dangerous and against the law.  This medicine may cause accidental overdose and death if it taken by other adults, children, or pets. Mix any unused medicine with a substance like cat litter or coffee grounds. Then throw the medicine away in a sealed container like a sealed bag or a coffee can with a lid. Do not use the medicine after the expiration date.  Store at room temperature between 15 and 30 degrees C (59 and 86 degrees F).  What should I tell my health care provider before I take this medicine?  They need to know if you have any of these conditions:  · brain tumor  · Crohn's disease, inflammatory bowel disease, or ulcerative colitis  · drug abuse or addiction  · head injury  · heart or circulation problems  · if you often drink alcohol  · kidney disease or problems going to the bathroom  · liver disease  · lung disease, asthma, or breathing problems  · an unusual or allergic reaction to acetaminophen, hydrocodone, other opioid analgesics, other medicines, foods, dyes, or preservatives  · pregnant or trying to get pregnant  · breast-feeding  What should I watch for while using this medicine?  Tell your doctor or health care professional if your pain does not go away, if it gets worse, or if you have new or a different type of pain. You may develop tolerance to the medicine. Tolerance means that you will need a  higher dose of the medicine for pain relief. Tolerance is normal and is expected if you take the medicine for a long time.  Do not suddenly stop taking your medicine because you may develop a severe reaction. Your body becomes used to the medicine. This does NOT mean you are addicted. Addiction is a behavior related to getting and using a drug for a non-medical reason. If you have pain, you have a medical reason to take pain medicine. Your doctor will tell you how much medicine to take. If your doctor wants you to stop the medicine, the dose will be slowly lowered over time to avoid any side effects.  There are different types of narcotic medicines (opiates). If you take more than one type at the same time or if you are taking another medicine that also causes drowsiness, you may have more side effects. Give your health care provider a list of all medicines you use. Your doctor will tell you how much medicine to take. Do not take more medicine than directed. Call emergency for help if you have problems breathing or unusual sleepiness.  Do not take other medicines that contain acetaminophen with this medicine. Always read labels carefully. If you have questions, ask your doctor or pharmacist.  If you take too much acetaminophen get medical help right away. Too much acetaminophen can be very dangerous and cause liver damage. Even if you do not have symptoms, it is important to get help right away.  You may get drowsy or dizzy. Do not drive, use machinery, or do anything that needs mental alertness until you know how this medicine affects you. Do not stand or sit up quickly, especially if you are an older patient. This reduces the risk of dizzy or fainting spells. Alcohol may interfere with the effect of this medicine. Avoid alcoholic drinks.  The medicine will cause constipation. Try to have a bowel movement at least every 2 to 3 days. If you do not have a bowel movement for 3 days, call your doctor or health care  professional.  Your mouth may get dry. Chewing sugarless gum or sucking hard candy, and drinking plenty of water may help. Contact your doctor if the problem does not go away or is severe.  Date Last Reviewed:   NOTE:This sheet is a summary. It may not cover all possible information. If you have questions about this medicine, talk to your doctor, pharmacist, or health care provider. Copyright© 2016 [employer]        Docusate Sodium; Senna tablets or capsules  What is this medicine?  DOCUSATE SODIUM; SENNA (doc CUE sayt JB sherry um; SEN na) contains a stool softener and a laxative. It is used to treat constipation.  How should I use this medicine?  Take this medicine by mouth with a full glass of water. Follow the directions on the label. Take your doses at regular intervals. Do not take your medicine more often than directed.  Talk to your pediatrician regarding the use of this medicine in children. While this medicine may be prescribed for children as young as 2 years for selected conditions, precautions do apply.  What side effects may I notice from receiving this medicine?  Side effects that you should report to your doctor or health care professional as soon as possible:  · allergic reactions like skin rash, itching or hives, swelling of the face, lips, or tongue  · muscle weakness  · unusually weak or tired  · unusual weight loss  Side effects that usually do not require medical attention (report to your doctor or health care professional if they continue or are bothersome):  · diarrhea  · discolored urine  · nausea, vomiting  · stomach cramps  · throat irritation  What may interact with this medicine?  · mineral oil  What if I miss a dose?  If you miss a dose, take it as soon as you can. If it is almost time for your next dose, take only that dose. Do not take double or extra doses.  Where should I keep my medicine?  Keep out of the reach of children.  Store at room temperature between 15 and 30 degrees C  (59 and 86 degrees F). Throw away any unused medicine after the expiration date.  What should I tell my health care provider before I take this medicine?  They need to know if you have any of these conditions:  · nausea or vomiting  · severe constipation  · stomach pain  · sudden change in bowel habit lasting more than 2 weeks  · an unusual or allergic reaction to docusate, senna, other medicines, foods, dyes, or preservatives  · pregnant or trying to get pregnant  · breast-feeding  What should I watch for while using this medicine?  Do not use for more than one week without advice from your doctor or health care professional. Long-term use can make your body depend on the laxative for regular bowel movements, damage the bowel, cause malnutrition, and problems with the amounts of water and salts in your body. If your constipation keeps returning, check with your doctor or health care professional.  Drink plenty of water while taking this medicine. This will help fight constipation.  Stop using this medicine and contact your doctor or health care professional if you experience any rectal bleeding or do not have a bowel movement after use. These could be signs of a more serious condition.  Date Last Reviewed:   NOTE:This sheet is a summary. It may not cover all possible information. If you have questions about this medicine, talk to your doctor, pharmacist, or health care provider. Copyright© 2016 [employer]              Primary Diagnosis     Your primary diagnosis was:  Lymphoma      Admission Information     Date & Time Provider Department CSN    5/15/2017 11:09 AM Rubin Breaux MD [facility] [id number]      Care Providers     Provider Role Specialty Primary office phone    Rubin Breaux MD Attending Provider Pathology [phone]    Rubin Breaux MD Surgeon  Pathology [phone]    Orlando Moulton MD Surgeon  General Surgery [phone]      Your Vitals Were     BP Pulse Temp Resp  "Height Weight    159/84 87 98 °F (36.7 °C) (Temporal) 15 5' 4" (1.626 m) 77.2 kg (170 lb 3.1 oz)    SpO2 BMI             98% 29.21 kg/m2         Recent Lab Values     No lab values to display.      Pending Labs     Order Current Status    Tissue Specimen to Pathology, Bone Marrow Aspiration/Biopsy Procedure Collected (05/15/17 1329)    Tissue Specimen to Pathology, Bone Marrow Aspiration/Biopsy Procedure Collected (05/15/17 1329)    Tissue Specimen to Pathology, Bone Marrow Aspiration/Biopsy Procedure Collected (05/15/17 1329)    Bone Marrow Prep and Stain In process    Chromosome Analysis, Bone Marrow In process    Iron Stain, Bone Marrow In process    Leukemia/Lymphoma Screen - Bone Marrow Right Posterior Iliac Crest In process      Allergies as of 5/15/2017        Reactions    Corticosteroids (Glucocorticoids) Nausea Only, Other (See Comments)    Stomach pain, dizziness, headache    Oxycodone Other (See Comments)    Blood pressure dropped      [facility]     [facility] Nurse Care Line - 24/7 Assistance  Unless otherwise directed by your provider, please contact [facility], our nurse care line that is available for 24/7 assistance.     Registered nurses in the [facility] provide clinical advisement, health education, appointment booking, and other advisory services.  Call for this free service at [phone].        Advance Directives     An advance directive is a document which, in the event you are no longer able to make decisions for yourself, tells your healthcare team what kind of treatment you do or do not want to receive, or who you would like to make those decisions for you.  If you do not currently have an advance directive, [facility] encourages you to create one.  For more information call:  [phone]), [phone] ([phone]),  or log on to [email or web address].        Smoking Cessation     If you would like to quit smoking:   You may be eligible for " free services if you are a [place] resident and started smoking cigarettes before September 1, 1988.  Call the Smoking Cessation [employer] (SCT) toll free at (204) 014-4941 or (697) 241-9568.   Call 8-800-QUIT-NOW if you do not meet the above criteria.   Contact us via email: [email or web address]   View our website for more information: [email or web address]        Language Assistance Services     ATTENTION: Language assistance services are available, free of charge. Please call [phone].      ATENCIÓN: Si habla español, tiene a castle disposición servicios gratuitos de asistencia lingüística. Llame al [phone].     CHÚ Ý: N?u b?n nói Ti?ng Vi?t, có các d?ch v? h? tr? ngôn ng? mi?n phí dành cho b?n. G?i s? [phone].        [facility] Sign-Up     Activating your [facility] account is as easy as 1-2-3!     1) Visit [email or web address], select Sign Up Now, enter this activation code and your date of birth, then select Next.  [email or web address]  Expires: 6/24/2017 11:47 AM      2) Create a username and password to use when you visit [facility] in the future and select a security question in case you lose your password and select Next.    3) Enter your e-mail address and click Sign Up!    Additional Information  If you have questions, please e-mail [email or web address] or call [phone] to talk to our [facility] staff. Remember, [facility] is NOT to be used for urgent needs. For medical emergencies, dial 911.          [facility] complies with applicable Federal civil rights laws and does not discriminate on the basis of race, color, national origin, age, disability, or sex.

## 2017-05-15 NOTE — ANESTHESIA PREPROCEDURE EVALUATION
05/15/2017  Violet Swenson is a 77 y.o., female.    Pre-op Assessment    I have reviewed the Patient Summary Reports.    I have reviewed the Nursing Notes.   I have reviewed the Medications.     Review of Systems  Anesthesia Hx:  Denies Family Hx of Anesthesia complications.   Denies Personal Hx of Anesthesia complications.   Cardiovascular:   Pacemaker Hypertension CAD  CABG/stent    CONCLUSIONS     1 - Moderate left atrial enlargement.     2 - Concentric hypertrophy.     3 - No wall motion abnormalities.     4 - Normal left ventricular systolic function (EF 55-60%).     5 - Indeterminate LV diastolic function.     6 - Normal right ventricular systolic function .     7 - The estimated PA systolic pressure is 24 mmHg.     8 - Trivial to mild mitral regurgitation.     9 - Trivial tricuspid regurgitation.             This document has been electronically    SIGNED BY: Nader Gil MD On: 05/03/2017 14:53    Cardiac stent 2010   Renal/:   Chronic Renal Disease    Musculoskeletal:   Arthritis     Endocrine:   Hypothyroidism    Psych:   Psychiatric History             Anesthesia Plan  Type of Anesthesia, risks & benefits discussed:  Anesthesia Type:  general  Patient's Preference:   Intra-op Monitoring Plan:   Intra-op Monitoring Plan Comments:   Post Op Pain Control Plan:   Post Op Pain Control Plan Comments:   Induction:   IV  Beta Blocker:  Patient is not currently on a Beta-Blocker (No further documentation required).       Informed Consent: Patient understands risks and agrees with Anesthesia plan.  Questions answered. Anesthesia consent signed with patient.  ASA Score: 3     Day of Surgery Review of History & Physical: I have interviewed and examined the patient. I have reviewed the patient's H&P dated:  There are no significant changes.

## 2017-05-15 NOTE — DISCHARGE INSTRUCTIONS
Vascular Access Port Implantation   Port implantation is surgery to place (implant) a port under the skin. For vascular access, it is placed into a vein. The port allows medicines or nutrition to be sent right into your bloodstream. Blood can also be taken or given through the port. During the procedure, a long, thin tube called a catheter is threaded into one of your large veins. The tube is then attached to the port. This usually sits under the skin of your chest and causes a small bump. To use the port, a special needle is passed through your skin and into the port. The needle can stay in your skin for up to 7 days, if needed. A port can stay in place for weeks or months or longer.    Why is a vascular access port needed?  A vascular access port may allow healthcare providers to give you:  · Chemotherapy or other cancer-fighting drugs  · IV treatments, such as antibiotics or nutrition  · Hemodialysis (for kidney failure)  The port may also be used to draw blood.  Before the procedure  Follow any instructions you are given on how to prepare.  Tell your provider about any medicines you are taking. This includes:  · All prescription medicines  · Over-the-counter medicines such as aspirin or ibuprofen  · Herbs, vitamins, and other supplements  Also be sure your provider knows:  · If you are pregnant or think you may be pregnant  · If you are allergic to any medicines or substances, especially local anesthetics or iodine  · Your full medical history, including why you will need the port  · If you plan on doing any contact sports  During the procedure  · Before the procedure, an IV may be put into a vein in your arm or hand. This gives you fluids and medicines. You may be given medicine through the IV to help you relax during the procedure. This is called sedation. But some surgeons place ports using general anesthesia.  · The chest is used most often for the port. In some cases, your belly (abdomen) or arm will be  used instead.  · The skin over the insertion area is numbed with local anesthetic.  · Ultrasound or X-rays are used to help the healthcare provider guide the catheter into the proper location during the procedure.  · A cut (incision) is made in the skin where the port will be placed. A small pocket for the port is formed under the skin.  · A second small incision is made in the skin near the first incision. A tunnel under the skin is created. The catheter is put through the tunnel and into the blood vessel.  · The skin is closed over the port. It is held shut with stitches (sutures) or surgical glue or tape. The second small incision is also closed.  · A chest X-ray may be done to make sure the port is placed properly.  After the procedure  You may be taken to a recovery room where youll recover from the sedation. Nurses will check on you as you rest. If you have pain, nurses can give you medicine. If you are not staying in the hospital overnight, you will be sent home a few hours after the procedure is done. A healthcare provider will tell you when you can go home. An adult family member or friend will need to drive you home.  Recovering at home  · Take pain medicine as directed by your healthcare provider.  · Take it easy for 24 hours after the procedure. Avoid physical activity and heavy lifting until your healthcare provider says its OK.  · Keep the port clean and dry. Ask when you can shower again. You will need to keep the port dry by covering it when you shower.  · Care for the insertion site as you are directed.  · Dont swim, bathe, or do other activities that cause water to cover the insertion site.  · To keep the port from getting blocked with blood clots, flush it as often as directed. You should be shown the proper way to flush the port before you go home. It is important to follow these directions.     Risks and possible complications of implantation  · Bleeding  · Infection of the insertion  site  · Damage to a blood vessel  · Nerve injury or irritation  · Collapsed lung (for chest port placements)  · Skin breakdown over the port  Risks and possible complications of having a port  · Blocked  port or catheter  · Leakage or breakage of the port or catheter  · The port moves out of position  · Blood clot  · Skin or bloodstream infection  · Skin breakdown over the port      When to seek medical care  Call your healthcare provider right away if you have any of the following:  · A fever of 100.4°F (38.0°C) or higher  · You can't access or use the port properly  · You can't flush the port or get a blood return  · The skin near the port is red, warm, swollen, or broken  · You have shoulder pain on the side where the port is located  · You feel a heart flutter or racing heart   · Swollen arm, if the port is placed in your arm   Date Last Reviewed: 7/1/2016  © 1741-4086 [employer]. [address] [place] [address]. All rights reserved. This information is not intended as a substitute for professional medical care. Always follow your healthcare professional's instructions.        Bone Marrow Aspiration and Biopsy    Bone marrow is the soft, spongy part inside bones. It makes most of the bodys blood cells. Aspiration and biopsy are procedures done to take a sample of bone marrow out of the body for examination. To perform either procedure, a needle is inserted into one of your bones, usually the back of the hip bone. Then a sample of bone marrow is removed.   If a sample of fluid and cells is taken, it is called bone marrow aspiration. If a solid sample of bone marrow tissue is removed, it is called bone marrow biopsy. In either case, the samples are sent to a lab and studied. The procedures can be done alone, but are most often done together. This sheet tells you more about what to expect.  Why the procedures are done  The procedures may be done for a number of reasons. They can help  diagnose certain blood or bone marrow disorders or infections. They may help find certain cancers, such as leukemia. They can show if cancer in other areas of the body has spread to the bone marrow. They can be used during cancer treatment, such as chemotherapy, to monitor treatment progress. And they may be done before certain treatments, such as a stem cell transplant, which require a bone marrow sample. Your healthcare provider will explain why you need the procedure and answer any questions you have.  Preparing for the procedure  Prepare for the procedure as told. In addition:  · Tell your healthcare provider:  ¨ What medicines you take. This includes blood thinners, such as aspirin. This also includes over-the-counter medicines, herbs, and other supplements. You may need to stop taking some or all of them before the procedure.  ¨ If you are allergic to any medicines. Also mention if you have ever had a reaction to medicines used during other tests or procedures in the past.  ¨ If you have a history of bleeding problems.  ¨ If you are pregnant or may be pregnant.  · Follow any directions youre given for not eating or drinking before the procedure.  The day of the procedure  The procedures can be done at a hospital, clinic, or healthcare providers office. They are performed by a healthcare provider or trained healthcare provider. Whether youre having one or both procedures, plan to be at the facility for 1 to 2 hours. Youll likely go home the same day.  Before the procedure begins  What to expect before the procedure:  · Youll change into a patient gown.  · An IV line may be put into a vein in your arm or hand. This line supplies fluids and medicines.  · Youll be given a sedative to help you relax, if needed. This medicine is given by pill, injection, or through the IV line.  During the procedure  What to expect during the procedure:  · Youll lie on your side or your stomach.  · The site to be used for the  bone marrow samples is marked and cleaned. The most common site is the back of the hip bone. Less common sites include the front of the hip bone or breastbone.  · Numbing medicine (local anesthesia) is injected at the site.  · A small cut is made through the numbed skin.  · One or both procedures are then done. Be sure to lie still for each procedure. It is normal to feel some pressure or pain during each procedure.  ¨ For the bone marrow aspiration, a thin needle is put through the cut and into the bone. A syringe is then attached to the needle and used to remove a sample of bone marrow fluid and cells.  ¨ For the bone marrow biopsy, a different needle is put through the same cut and into the bone. A small amount of bone marrow tissue is then removed.  · The samples are sent to a lab to be evaluated.  · When the procedure is complete, pressure is applied to the site for 10 to 15 minutes to help stop bleeding. The site is then bandaged.  After the procedure  Most people can go home after a short period of observation. If you need it, youll be given medicine to manage pain. If you were given a sedative, you may be taken to a recovery room to rest until the medicine wears off. An adult family member or friend must drive you home afterward.  Recovering at home  Once at home, follow any instructions youre given. Be sure to:  · Take all medicines as directed.  · Care for the procedure site as instructed.  · Check for signs of infection at the procedure site (see below).  · Avoid getting the procedure site wet. Do not bathe or shower until your healthcare providers say it is OK to do so. If you wish, you may wash with a sponge or washcloth.  · Avoid heavy lifting and other strenuous activities as directed.     Call the healthcare provider  Call your healthcare provider if you have any of the following:  · Fever of 100.4 ºF (38 ºC) or higher, or as directed by your healthcare provider  · Signs of infection at the  procedure site, such as increased redness or swelling, warmth, worsening pain, bleeding, or foul-smelling drainage   Follow-up  Your healthcare provider will discuss the results with you when they are ready. This is usually within a week after the procedure.     Risks and possible complications of these procedures  These include:  · Severe bleeding or bruising at the procedure site  · Infection at the procedure site  · Bone fracture  · Bone infection   Date Last Reviewed: 10/8/2015  © 3079-6033 [employer]. [address] [place] [id number]. All rights reserved. This information is not intended as a substitute for professional medical care. Always follow your healthcare professional's instructions.      General Information:    1.  Do not drink alcoholic beverages including beer for 24 hours or as long as you are on pain medication..  2.  Do not drive a motor vehicle, operate machinery or power tools, or signs legal papers for 24 hours or as long as you are on pain medication.   3.  You may experience light-headedness, dizziness, and sleepiness following surgery. Please do not stay alone. A responsible adult should be with you for this 24 hour period.  4.  Go home and rest.    5. Progress slowly to a normal diet unless instructed.  Otherwise, begin with liquids such as soft drinks, then soup and crackers working up to solid foods. Drink plenty of nonalcoholic fluids.  6.  Certain anesthetics and pain medications produce nausea and vomiting in certain       individuals. If nausea becomes a problem at home, call you doctor.    7. A nurse will be calling you sometime after surgery. Do not be alarmed. This is our way of finding out how you are doing.    8. Several times every hour while you are awake, take 2-3 deep breaths and cough. If you had stomach surgery hold a pillow or rolled towel firmly against your stomach before you cough. This will help with any pain the cough might cause.  9. Several  times every hour while you are awake, pump and flex your feet 5-6 times and do foot circles. This will help prevent blood clots.    10.Call your doctor for severe pain, bleeding, fever, or signs or symptoms of infection (pain, swelling, redness, foul odor, drainage).    11.You can contact your doctor anytime by [phone] for the [facility] (at [facility]) or [phone] for the [facility] on [facility].   [email or web address] is another way to contact your doctor if you are an active participant online with [facility].        Acetaminophen; Hydrocodone tablets or capsules  What is this medicine?  ACETAMINOPHEN; HYDROCODONE (a set a [place]ONIA jesus fen; shun droe KOE done) is a pain reliever. It is used to treat moderate to severe pain.  How should I use this medicine?  Take this medicine by mouth with a glass of water. Follow the directions on the prescription label. You can take it with or without food. If it upsets your stomach, take it with food. Do not take your medicine more often than directed.  A special MedGuide will be given to you by the pharmacist with each prescription and refill. Be sure to read this information carefully each time.  Talk to your pediatrician regarding the use of this medicine in children. Special care may be needed.  What side effects may I notice from receiving this medicine?  Side effects that you should report to your doctor or health care professional as soon as possible:  · allergic reactions like skin rash, itching or hives, swelling of the face, lips, or tongue  · breathing problems  · confusion  · redness, blistering, peeling or loosening of the skin, including inside the mouth  · signs and symptoms of low blood pressure like dizziness; feeling faint or lightheaded, falls; unusually weak or tired  · trouble passing urine or change in the amount of urine  · yellowing of the eyes or skin  Side effects that usually do not require medical attention (report to your  doctor or health care professional if they continue or are bothersome):  · constipation  · dry mouth  · nausea, vomiting  · tiredness  What may interact with this medicine?  This medicine may interact with the following medications:  · alcohol  · antiviral medicines for HIV or AIDS  · atropine  · antihistamines for allergy, cough and cold  · certain antibiotics like erythromycin, clarithromycin  · certain medicines for anxiety or sleep  · certain medicines for bladder problems like oxybutynin, tolterodine  · certain medicines for depression like amitriptyline, fluoxetine, sertraline  · certain medicines for fungal infections like ketoconazole and itraconazole  · certain medicines for Parkinson's disease like benztropine, trihexyphenidyl  · certain medicines for seizures like carbamazepine, phenobarbital, phenytoin, primidone  · certain medicines for stomach problems like dicyclomine, hyoscyamine  · certain medicines for travel sickness like scopolamine  · general anesthetics like halothane, isoflurane, methoxyflurane, propofol  · ipratropium  · local anesthetics like lidocaine, pramoxine, tetracaine  · MAOIs like Carbex, Eldepryl, Marplan, Nardil, and Parnate  · medicines that relax muscles for surgery  · other medicines with acetaminophen  · other narcotic medicines for pain or cough  · phenothiazines like chlorpromazine, mesoridazine, prochlorperazine, thioridazine  · rifampin  What if I miss a dose?  If you miss a dose, take it as soon as you can. If it is almost time for your next dose, take only that dose. Do not take double or extra doses.  Where should I keep my medicine?  Keep out of the reach of children. This medicine can be abused. Keep your medicine in a safe place to protect it from theft. Do not share this medicine with anyone. Selling or giving away this medicine is dangerous and against the law.  This medicine may cause accidental overdose and death if it taken by other adults, children, or pets. Mix  any unused medicine with a substance like cat litter or coffee grounds. Then throw the medicine away in a sealed container like a sealed bag or a coffee can with a lid. Do not use the medicine after the expiration date.  Store at room temperature between 15 and 30 degrees C (59 and 86 degrees F).  What should I tell my health care provider before I take this medicine?  They need to know if you have any of these conditions:  · brain tumor  · Crohn's disease, inflammatory bowel disease, or ulcerative colitis  · drug abuse or addiction  · head injury  · heart or circulation problems  · if you often drink alcohol  · kidney disease or problems going to the bathroom  · liver disease  · lung disease, asthma, or breathing problems  · an unusual or allergic reaction to acetaminophen, hydrocodone, other opioid analgesics, other medicines, foods, dyes, or preservatives  · pregnant or trying to get pregnant  · breast-feeding  What should I watch for while using this medicine?  Tell your doctor or health care professional if your pain does not go away, if it gets worse, or if you have new or a different type of pain. You may develop tolerance to the medicine. Tolerance means that you will need a higher dose of the medicine for pain relief. Tolerance is normal and is expected if you take the medicine for a long time.  Do not suddenly stop taking your medicine because you may develop a severe reaction. Your body becomes used to the medicine. This does NOT mean you are addicted. Addiction is a behavior related to getting and using a drug for a non-medical reason. If you have pain, you have a medical reason to take pain medicine. Your doctor will tell you how much medicine to take. If your doctor wants you to stop the medicine, the dose will be slowly lowered over time to avoid any side effects.  There are different types of narcotic medicines (opiates). If you take more than one type at the same time or if you are taking another  medicine that also causes drowsiness, you may have more side effects. Give your health care provider a list of all medicines you use. Your doctor will tell you how much medicine to take. Do not take more medicine than directed. Call emergency for help if you have problems breathing or unusual sleepiness.  Do not take other medicines that contain acetaminophen with this medicine. Always read labels carefully. If you have questions, ask your doctor or pharmacist.  If you take too much acetaminophen get medical help right away. Too much acetaminophen can be very dangerous and cause liver damage. Even if you do not have symptoms, it is important to get help right away.  You may get drowsy or dizzy. Do not drive, use machinery, or do anything that needs mental alertness until you know how this medicine affects you. Do not stand or sit up quickly, especially if you are an older patient. This reduces the risk of dizzy or fainting spells. Alcohol may interfere with the effect of this medicine. Avoid alcoholic drinks.  The medicine will cause constipation. Try to have a bowel movement at least every 2 to 3 days. If you do not have a bowel movement for 3 days, call your doctor or health care professional.  Your mouth may get dry. Chewing sugarless gum or sucking hard candy, and drinking plenty of water may help. Contact your doctor if the problem does not go away or is severe.  Date Last Reviewed:   NOTE:This sheet is a summary. It may not cover all possible information. If you have questions about this medicine, talk to your doctor, pharmacist, or health care provider. Copyright© 2016 [employer]        Docusate Sodium; Senna tablets or capsules  What is this medicine?  DOCUSATE SODIUM; SENNA (doc CUE sayt JB powell um; SEN na) contains a stool softener and a laxative. It is used to treat constipation.  How should I use this medicine?  Take this medicine by mouth with a full glass of water. Follow the directions on the  label. Take your doses at regular intervals. Do not take your medicine more often than directed.  Talk to your pediatrician regarding the use of this medicine in children. While this medicine may be prescribed for children as young as 2 years for selected conditions, precautions do apply.  What side effects may I notice from receiving this medicine?  Side effects that you should report to your doctor or health care professional as soon as possible:  · allergic reactions like skin rash, itching or hives, swelling of the face, lips, or tongue  · muscle weakness  · unusually weak or tired  · unusual weight loss  Side effects that usually do not require medical attention (report to your doctor or health care professional if they continue or are bothersome):  · diarrhea  · discolored urine  · nausea, vomiting  · stomach cramps  · throat irritation  What may interact with this medicine?  · mineral oil  What if I miss a dose?  If you miss a dose, take it as soon as you can. If it is almost time for your next dose, take only that dose. Do not take double or extra doses.  Where should I keep my medicine?  Keep out of the reach of children.  Store at room temperature between 15 and 30 degrees C (59 and 86 degrees F). Throw away any unused medicine after the expiration date.  What should I tell my health care provider before I take this medicine?  They need to know if you have any of these conditions:  · nausea or vomiting  · severe constipation  · stomach pain  · sudden change in bowel habit lasting more than 2 weeks  · an unusual or allergic reaction to docusate, senna, other medicines, foods, dyes, or preservatives  · pregnant or trying to get pregnant  · breast-feeding  What should I watch for while using this medicine?  Do not use for more than one week without advice from your doctor or health care professional. Long-term use can make your body depend on the laxative for regular bowel movements, damage the bowel, cause  malnutrition, and problems with the amounts of water and salts in your body. If your constipation keeps returning, check with your doctor or health care professional.  Drink plenty of water while taking this medicine. This will help fight constipation.  Stop using this medicine and contact your doctor or health care professional if you experience any rectal bleeding or do not have a bowel movement after use. These could be signs of a more serious condition.  Date Last Reviewed:   NOTE:This sheet is a summary. It may not cover all possible information. If you have questions about this medicine, talk to your doctor, pharmacist, or health care provider. Copyright© 2016 [employer]

## 2017-05-15 NOTE — OP NOTE
Bone Marrow Biopsy Procedure Note    Date of Service: 5/15/2017    Indication/Diagnosis: Large cell lymphoma    Consent source: Self    Consent type: Elective procedure and indications/complications discussed; verbal and signed consent obtained.    Time out completed: yes    Aseptic technique: yes    Anesthesia: MAC    Local anesthetic drugs used: 1% lidocaine    Instrumentation: Bone marrow kit    Procedure site: Right posterior iliac crest    Patient position: Prone    Volume removed: 12 cc    Aspirate obtained: yes: EDTA - sent to [facility] for analysis    Fluid characteristics: Spicules found    Core biopsy obtained: yes    Dressing applied: yes    Complications: none    Dispo: Per general surgery

## 2017-05-15 NOTE — INTERVAL H&P NOTE
The patient has been examined and the H&P has been reviewed:    No change has occurred in the patient's condition since the H&P was completed.    Procedure risks, benefits and alternative options discussed and understood by patient/family.          Active Hospital Problems    Diagnosis  POA    Lymphoma [C85.90]  Yes      Resolved Hospital Problems    Diagnosis Date Resolved POA   No resolved problems to display.

## 2017-05-15 NOTE — H&P (VIEW-ONLY)
"Subjective:       Patient ID: Violet Swenson is a 77 y.o. female.    Chief Complaint: No chief complaint on file.    HPI   S/p right hemicolectomy 4/7/17 presents to discuss port placement. She is doing well without complaints.      Past Medical History:   Diagnosis Date    Arthritis     Coronary artery disease     01/2015 [facility] patent LCX. 50% stenosis in LAD and RCA.      Depression     GERD (gastroesophageal reflux disease)     Gout, arthritis     Hyperlipidemia     Hypertension     Hypothyroidism     Lung nodule 2014    RML--stable    Pacemaker     Metronic    Pneumonia        Past Surgical History:   Procedure Laterality Date    APPENDECTOMY      CARDIAC PACEMAKER PLACEMENT  01/22/2015    CHOLECYSTECTOMY      COLONOSCOPY N/A 4/6/2017    Procedure: COLONOSCOPY;  Surgeon: Tye Enamorado MD;  Location: [facility];  Service: Endoscopy;  Laterality: N/A;    CORONARY ANGIOPLASTY  02/2014    CORONARY STENT PLACEMENT  02/05/2014    GASTRIC BYPASS      HERNIA REPAIR      TONSILLECTOMY      TOTAL KNEE ARTHROPLASTY Bilateral        Family History   Problem Relation Age of Onset    Heart disease Mother     Hypertension Mother     Stomach cancer Father      "ulcers that turned to cancer"    Pancreatic cancer Sister     Leukemia Brother      "leukemia which led to intestinal cancer"       Social History     Social History    Marital status:      Spouse name: N/A    Number of children: N/A    Years of education: N/A     Social History Main Topics    Smoking status: Former Smoker    Smokeless tobacco: None    Alcohol use No    Drug use: No    Sexual activity: Not Asked     Other Topics Concern    None     Social History Narrative       Current Outpatient Prescriptions   Medication Sig Dispense Refill    albuterol 90 mcg/actuation inhaler Inhale 1-2 puffs into the lungs every 6 (six) hours as needed for Wheezing. Rescue 18 g 0    allopurinol (ZYLOPRIM) 100 MG tablet TAKE 2 " TABLETS BY MOUTH DAILY 180 tablet 0    alprazolam (XANAX) 0.25 MG tablet Take 1 tablet (0.25 mg total) by mouth 2 (two) times daily as needed for Anxiety. 60 tablet 5    aspirin (ECOTRIN) 81 MG EC tablet Take 81 mg by mouth once daily.       clopidogrel (PLAVIX) 75 mg tablet Take 1 tablet (75 mg total) by mouth once daily. 90 tablet 3    COLCRYS 0.6 mg tablet TAKE 1 TABLET(0.6 MG) BY MOUTH EVERY DAY 30 tablet 3    ergocalciferol, vitamin D2, 400 unit Tab Take by mouth daily.      fluticasone (FLONASE) 50 mcg/actuation nasal spray 2 sprays by Each Nare route once daily.      gabapentin (NEURONTIN) 100 MG capsule Take 100 mg by mouth 3 (three) times daily.      hydrochlorothiazide (HYDRODIURIL) 12.5 MG Tab TAKE 1 TABLET BY MOUTH ONCE DAILY 90 tablet 1    hydrocodone-acetaminophen 5-325mg (NORCO) 5-325 mg per tablet Take 1 tablet by mouth every 4 (four) hours as needed. 30 tablet 0    inhalation spacing device (iWitnessEFLO AEROCHAMBER) Use as directed for inhalation. 1 Device 0    isosorbide mononitrate (IMDUR) 30 MG 24 hr tablet TAKE 1 TABLET BY MOUTH EVERY DAY 30 tablet 6    levothyroxine (SYNTHROID) 75 MCG tablet TAKE 1 TABLET(75 MCG) BY MOUTH BEFORE BREAKFAST 90 tablet 1    meloxicam (MOBIC) 7.5 MG tablet TAKE 1 TABLET BY MOUTH ONCE DAILY AS NEEDED FOR PAIN 90 tablet 0    nitroglycerin 400 mcg/spray SprA Place onto the tongue.      ondansetron (ZOFRAN-ODT) 8 MG TbDL Take 1 tablet (8 mg total) by mouth every 8 (eight) hours as needed (Nausea). 10 tablet 0    rosuvastatin (CRESTOR) 10 MG tablet Take 1 tablet (10 mg total) by mouth once daily. 30 tablet 6    sertraline (ZOLOFT) 100 MG tablet Take 1 tablet (100 mg total) by mouth once daily. 90 tablet 3    turmeric root extract 500 mg Cap Take 500 mg by mouth 2 (two) times daily.      verapamil (CALAN-SR) 120 MG CR tablet TAKE 1/2 TABLET BY MOUTH NIGHTLY 30 tablet 6    ZINC ACETATE ORAL 250 mg.       No current facility-administered medications for this  visit.        Review of patient's allergies indicates:   Allergen Reactions    Corticosteroids (glucocorticoids) Nausea Only and Other (See Comments)     Stomach pain, dizziness, headache    Oxycodone Other (See Comments)     Blood pressure dropped       Review of Systems   Constitutional: Negative for chills and fever.   HENT: Negative for congestion.    Eyes: Negative for visual disturbance.   Respiratory: Negative for cough and shortness of breath.    Cardiovascular: Negative for chest pain, palpitations and leg swelling.   Gastrointestinal: Negative for abdominal distention, abdominal pain, constipation, diarrhea, nausea and vomiting.   Endocrine: Negative for polyuria.   Genitourinary: Negative for dysuria.   Skin: Negative for rash.   Neurological: Negative for dizziness and light-headedness.   Hematological: Negative for adenopathy.       Objective:      Physical Exam   Constitutional: She is oriented to person, place, and time. She appears well-developed and well-nourished. No distress.   HENT:   Head: Normocephalic and atraumatic.   Eyes: EOM are normal.   Neck: Neck supple.   Cardiovascular: Normal rate and regular rhythm.    Pulmonary/Chest: Effort normal and breath sounds normal.   Left chest pacemaker   Abdominal: Soft. Bowel sounds are normal. She exhibits no distension. There is no tenderness.   Incision healing well staples intact, no signs of infection   Neurological: She is alert and oriented to person, place, and time.   Skin: Skin is warm and dry.   Vitals reviewed.      FINAL PATHOLOGIC DIAGNOSIS  1. COLON MASS, PARTIAL COLECTOMY- DIFFUSE LARGE B-CELL LYMPHOMA, GERMINAL CENTER  ORIGIN.  Assessment:   S/p right hemicolectomy with B-cell lymphoma  Plan:       -Port placement 5/15/17  -risks and benefits discussed with patient including: pain, bleeding, infection, injury to vessels, pneumothorax

## 2017-05-15 NOTE — ANESTHESIA POSTPROCEDURE EVALUATION
"Anesthesia Post Evaluation    Patient: Violet Swenson    Procedure(s) Performed: Procedure(s) (LRB):  [id number] (N/A)  BIOPSY-BONE MARROW (N/A)    Final Anesthesia Type: general  Patient location during evaluation: PACU  Patient participation: Yes- Able to Participate  Level of consciousness: awake and alert  Post-procedure vital signs: reviewed and stable  Pain management: adequate  Airway patency: patent  PONV status at discharge: No PONV  Anesthetic complications: no      Cardiovascular status: blood pressure returned to baseline  Respiratory status: unassisted  Hydration status: euvolemic  Follow-up not needed.        Visit Vitals    BP (!) 160/74    Pulse 78    Temp 36.6 °C (97.9 °F) (Temporal)    Resp 19    Ht 5' 4" (1.626 m)    Wt 77.2 kg (170 lb 3.1 oz)    SpO2 97%    Breastfeeding No    BMI 29.21 kg/m2       Pain/Prosper Score: Pain Assessment Performed: Yes (5/15/2017 12:07 PM)  Presence of Pain: complains of pain/discomfort (5/15/2017  3:30 PM)  Pain Rating Prior to Med Admin: 5 (5/15/2017  3:22 PM)  Prosper Score: 10 (5/15/2017  3:30 PM)      "

## 2017-05-15 NOTE — TRANSFER OF CARE
"Anesthesia Transfer of Care Note    Patient: Violet Swenson    Procedure(s) Performed: Procedure(s) (LRB):  [id number] (N/A)  BIOPSY-BONE MARROW (N/A)    Patient location: PACU    Anesthesia Type: MAC    Transport from OR: Transported from OR on room air with adequate spontaneous ventilation    Post pain: adequate analgesia    Post assessment: no apparent anesthetic complications    Post vital signs: stable    Level of consciousness: awake, alert and oriented    Nausea/Vomiting: no nausea/vomiting    Complications: none    Transfer of care protocol was followed      Last vitals:   Visit Vitals    /67    Pulse 94    Temp 36.7 °C (98 °F) (Temporal)    Resp 15    Ht 5' 4" (1.626 m)    Wt 77.2 kg (170 lb 3.1 oz)    SpO2 95%    Breastfeeding No    BMI 29.21 kg/m2     "

## 2017-05-15 NOTE — PLAN OF CARE
Pt and pt daughter given discharge instructions. Both stated understanding. Pt to be brought to main entrance via wheelchair with RN.

## 2017-05-15 NOTE — INTERVAL H&P NOTE
The patient has been examined and the H&P has been reviewed:    I concur with the findings and no changes have occurred since H&P was written.    Anesthesia/Surgery risks, benefits and alternative options discussed and understood by patient/family.          Active Hospital Problems    Diagnosis  POA    Lymphoma [C85.90]  Yes      Resolved Hospital Problems    Diagnosis Date Resolved POA   No resolved problems to display.

## 2017-05-15 NOTE — H&P (VIEW-ONLY)
Subjective:       Patient ID: Violet Swenson is a 77 y.o. female.    Chief Complaint: Follow-up    HPI This is a 77-year-old  lady who comes for follow up of hr large cell lymphoma.  She  was   admitted to the hospital in early April with diffuse abdominal pain. A CT of   the abdomen done on 2017 was reported as showing a 6.9 x 7.0 x 8.4 cm soft  tissue mass in the right lower quadrant in the terminal ileum abutting the   cecum. There was adjacent conglomerate adenopathy in the right lower quadrant   mesentery.     The patient had a colonoscopy that showed a lesion in the terminal ileum.     She underwent a right hemicolectomy and terminal ileum removal. 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  She had a Ct/PET that shows evidence of surgery and some non specific  findings, but no evidence of activer disease elsewhere.  An ECHO showed normal  cardiac ejection fraction     The patient comes today accompanied by her      ALLERGIES: Oxycodone. The chart says that she has allergic to steroids, but   says she is not.     MEDICATIONS: See MedCard.     PREVIOUS SURGERIES: Appendectomy in , tonsillectomy at age 19, gastric   bypass with incidental cholecystectomy, bilateral knee replacement in .     SOCIAL HISTORY: She is . She had two natural children, and one adopted   one. The adopted child has . She lives in [place] with her   . She smoked for two years, averaging a pack a day. She stopped 35   years ago or so. Denies any alcohol intake. She worked in [employer].     FAMILY HISTORY: Father  of colon cancer. Younger brother had leukemia that  transform into a lymphoma. Sister had pancreatic cancer.  Review of Systems   Constitutional: Positive for unexpected weight change. Negative for appetite change.   HENT: Negative.    Eyes: Negative.  Negative for visual disturbance.   Respiratory: Negative.  Negative  for cough and wheezing.    Cardiovascular: Negative.  Negative for chest pain.   Gastrointestinal: Positive for diarrhea. Negative for abdominal pain.   Genitourinary: Positive for frequency.   Musculoskeletal: Negative for back pain.   Skin: Negative for rash.   Neurological: Negative.  Negative for headaches.   Hematological: Negative for adenopathy.   Psychiatric/Behavioral: Negative.  The patient is not nervous/anxious.        Objective:      Physical Exam   Constitutional: She is oriented to person, place, and time. She appears well-developed. No distress.   HENT:   Head: Normocephalic.   Right Ear: Tympanic membrane, external ear and ear canal normal.   Left Ear: Tympanic membrane, external ear and ear canal normal.   Nose: Nose normal. Right sinus exhibits no maxillary sinus tenderness and no frontal sinus tenderness. Left sinus exhibits no maxillary sinus tenderness and no frontal sinus tenderness.   Mouth/Throat: Oropharynx is clear and moist and mucous membranes are normal.   Teeth normal.  Gums normal.   Eyes: Conjunctivae and lids are normal. Pupils are equal, round, and reactive to light.   Neck: Normal carotid pulses, no hepatojugular reflux and no JVD present. Carotid bruit is not present. No tracheal deviation present. No thyroid mass and no thyromegaly present.   Cardiovascular: Normal rate, regular rhythm, S1 normal, S2 normal, normal heart sounds and intact distal pulses.  Exam reveals no gallop and no friction rub.    No murmur heard.  Carotid exam normal   Pulmonary/Chest: Effort normal and breath sounds normal. No accessory muscle usage. No respiratory distress. She has no wheezes. She has no rales. She exhibits no tenderness.   Abdominal: Soft. Normal appearance. She exhibits no distension and no mass. There is no splenomegaly or hepatomegaly. There is no tenderness. There is no rebound and no guarding.   Musculoskeletal: Normal range of motion. She exhibits no edema or tenderness.         Right hand: Normal.        Left hand: Normal.       Lymphadenopathy:     She has no cervical adenopathy.     She has no axillary adenopathy.        Right: No inguinal and no supraclavicular adenopathy present.        Left: No inguinal and no supraclavicular adenopathy present.   Neurological: She is alert and oriented to person, place, and time. She has normal strength. No cranial nerve deficit. Coordination normal.   Skin: Skin is warm and dry. No rash noted. She is not diaphoretic. No cyanosis or erythema. No pallor. Nails show no clubbing.   Psychiatric: She has a normal mood and affect. Her behavior is normal. Judgment and thought content normal.     .  Wt Readings from Last 3 Encounters:   05/05/17 77.7 kg (171 lb 4.8 oz)   04/27/17 80.5 kg (177 lb 7.5 oz)   04/26/17 81.2 kg (179 lb 0.2 oz)     Temp Readings from Last 3 Encounters:   05/05/17 97.7 °F (36.5 °C) (Oral)   04/27/17 98 °F (36.7 °C) (Tympanic)   04/26/17 97.9 °F (36.6 °C) (Oral)     BP Readings from Last 3 Encounters:   05/05/17 (!) 150/90   04/27/17 122/68   04/26/17 130/82     Pulse Readings from Last 3 Encounters:   05/05/17 85   04/27/17 87   04/26/17 86       Assessment:       1. Lymphoma malignant, large cell    2. Diffuse large B-cell lymphoma of intra-abdominal lymph nodes    3. Chronic anemia        Plan:       Lab Results   Component Value Date    WBC 8.09 04/26/2017    HGB 9.1 (L) 04/26/2017    HCT 29.3 (L) 04/26/2017    MCV 89 04/26/2017     (H) 04/26/2017     Lab Results   Component Value Date    CREATININE 0.9 04/26/2017     We discussed with the patient the reports of the CT/PET and the ECHO.  We discussed that the standard of care is to give R-CHOP x 4-6 cycles. We discussed potential side effects including myelosuppression, alopecia, nausea, vomiting, need for Mediport, diarrhea, mucositis and cardio-toxicity  She ahd an ECHO with a normal cardiac ejection fraction just a few days but she seems to have an important past  medical history of caridac disease.  We will have her meet with Dr Gil, her cardiologist, to see if she can get ADRIAMYCIN  She will need a bone amrrow to be done on May 15.  She will meet with Dr Christensen for a Medi-port.  She will see me on May 18 with a cbc and a cmp.  She is already on Allopurinol.  A G6PD will be drawn today in anticipation of Elitek use.  Hep B/C and HIV serologies are negative  Complex visit requiring 45 minutes of direct patient care,addressing multiple issues and discussing case with other specilaists      Lab Results   Component Value Date    ALT 10 04/26/2017    AST 23 04/26/2017    ALKPHOS 73 04/26/2017    BILITOT 0.2 04/26/2017

## 2017-05-15 NOTE — BRIEF OP NOTE
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 hydrocodone-acetaminophen 5-325mg (NORCO) 5-325 mg per tablet Take 1 tablet by mouth every 6 (six) hours as needed for Pain.  Qty: 10 tablet, Refills: 0      senna-docusate 8.6-50 mg (PERICOLACE) 8.6-50 mg per tablet Take 1 tablet by mouth 2 (two) times daily.       !! - Potential duplicate medications found. Please discuss with provider.      CONTINUE these medications which have NOT CHANGED    Details   albuterol 90 mcg/actuation inhaler Inhale 1-2 puffs into the lungs every 6 (six) hours as needed for Wheezing. Rescue  Qty: 18 g, Refills: 0    Associated Diagnoses:  Acute bronchitis, unspecified organism; Fatigue, unspecified type; SOB (shortness of breath)      allopurinol (ZYLOPRIM) 100 MG tablet TAKE 2 TABLETS BY MOUTH DAILY  Qty: 180 tablet, Refills: 0    Associated Diagnoses: Idiopathic chronic gout of multiple sites without tophus      COLCRYS 0.6 mg tablet TAKE 1 TABLET(0.6 MG) BY MOUTH EVERY DAY  Qty: 30 tablet, Refills: 3    Associated Diagnoses: Idiopathic chronic gout of multiple sites without tophus      ergocalciferol, vitamin D2, 400 unit Tab Take by mouth daily.      fluticasone (FLONASE) 50 mcg/actuation nasal spray 2 sprays by Each Nare route once daily.      gabapentin (NEURONTIN) 100 MG capsule Take 100 mg by mouth 3 (three) times daily.      hydrochlorothiazide (HYDRODIURIL) 12.5 MG Tab TAKE 1 TABLET BY MOUTH ONCE DAILY  Qty: 90 tablet, Refills: 1    Comments: **Patient requests 90 days supply**      inhalation spacing device (RITEFLO AEROCHAMBER) Use as directed for inhalation.  Qty: 1 Device, Refills: 0    Associated Diagnoses: Acute bronchitis, unspecified organism      isosorbide mononitrate (IMDUR) 30 MG 24 hr tablet Take 1 tablet (30 mg total) by mouth once daily.  Qty: 90 tablet, Refills: 3    Associated Diagnoses: Coronary artery disease involving native coronary artery of native heart with unstable angina pectoris      levothyroxine (SYNTHROID) 75 MCG tablet TAKE 1 TABLET(75 MCG) BY MOUTH BEFORE BREAKFAST  Qty: 90 tablet, Refills: 1    Associated Diagnoses: Hypothyroidism (acquired)      meloxicam (MOBIC) 7.5 MG tablet TAKE 1 TABLET BY MOUTH ONCE DAILY AS NEEDED FOR PAIN  Qty: 90 tablet, Refills: 0    Associated Diagnoses: Primary osteoarthritis involving multiple joints      rosuvastatin (CRESTOR) 10 MG tablet Take 1 tablet (10 mg total) by mouth once daily.  Qty: 30 tablet, Refills: 6    Associated Diagnoses: Hyperlipidemia, unspecified hyperlipidemia type      sertraline (ZOLOFT) 100 MG tablet Take 1 tablet (100 mg total) by mouth once  daily.  Qty: 90 tablet, Refills: 3    Comments: **Patient requests 90 days supply**  Associated Diagnoses: Major depression, chronic; Situational anxiety; Insomnia secondary to anxiety      turmeric root extract 500 mg Cap Take 500 mg by mouth 2 (two) times daily.      verapamil (CALAN-SR) 120 MG CR tablet TAKE 1/2 TABLET BY MOUTH NIGHTLY  Qty: 30 tablet, Refills: 6    Associated Diagnoses: Essential hypertension      ZINC ACETATE ORAL 250 mg.      alprazolam (XANAX) 0.25 MG tablet Take 1 tablet (0.25 mg total) by mouth 2 (two) times daily as needed for Anxiety.  Qty: 60 tablet, Refills: 5    Associated Diagnoses: Situational anxiety      aspirin (ECOTRIN) 81 MG EC tablet Take 81 mg by mouth once daily.       clopidogrel (PLAVIX) 75 mg tablet Take 1 tablet (75 mg total) by mouth once daily.  Qty: 90 tablet, Refills: 3    Associated Diagnoses: Coronary artery disease without angina pectoris, unspecified vessel or lesion type, unspecified whether native or transplanted heart      !! hydrocodone-acetaminophen 5-325mg (NORCO) 5-325 mg per tablet Take 1 tablet by mouth every 4 (four) hours as needed.  Qty: 30 tablet, Refills: 0      nitroglycerin 400 mcg/spray SprA Place onto the tongue.      ondansetron (ZOFRAN-ODT) 8 MG TbDL Take 1 tablet (8 mg total) by mouth every 8 (eight) hours as needed (Nausea).  Qty: 10 tablet, Refills: 0       !! - Potential duplicate medications found. Please discuss with provider.          Discharge Procedure Orders  Diet general     Activity as tolerated     Shower on day dressing removed (No bath)     Call MD for:  temperature >100.4     Call MD for:  persistent nausea and vomiting     Call MD for:  severe uncontrolled pain     Call MD for:  difficulty breathing, headache or visual disturbances     Call MD for:  redness, tenderness, or signs of infection (pain, swelling, redness, odor or green/yellow discharge around incision site)     Call MD for:  hives     Call MD for:  persistent  dizziness or light-headedness     Call MD for:  extreme fatigue     Remove dressing in 48 hours       Follow-up Information     Follow up with Orlnado Moulton MD.    Specialty:  General Surgery    Why:  As needed    Contact information:    [address] [place] [address] DR S[address] WIN [id number] [phone]

## 2017-05-15 NOTE — OP NOTE
DATE: 5/15/17    PREOPERATIVE DIAGNOSIS: lymphoma    POSTOPERATIVE DIAGNOSIS: lymphoma    PROCEDURE PERFORMED: right subclavian MediPort placement.     ATTENDING SURGEON: Orlando Moulton    ANESTHESIA: Monitored anesthesia care plus local.     ESTIMATED BLOOD LOSS: 5 mL     FINDINGS: A right subclavian port placed with tip at junction of superior   vena cava and right atrium.     SPECIMEN: None.     DRAINS: None.     COMPLICATIONS: None.     INDICATIONS: Violet Swenson is a 77 y.o.female recently diagnosed  with lymphoma. General Surgery was consulted for port placement for chemotherapy. We recommended a right subclavian port and the patient agreed to proceed. The patient did sign informed consent and expressed understanding of the risks and benefits of surgery.     OPERATIVE PROCEDURE: The patient was identified in Preoperative Holding,   brought back to the Operating Room, and placed supine on the operating table   and padded appropriately. Monitors were applied. Monitored anesthesia care   was initiated. The left chest was prepped and draped in the standard sterile   surgical fashion. A timeout was performed and all team members present agreed   this was the correct procedure on the correct patient. We also confirmed   administration of appropriate preoperative antibiotics.     After administration of appropriate local anesthesia, the right subclavian vein was cannulated with a hollow-bore needle. A guidewire was placed and confirmed to be in correct position by fluoroscopy. An appropriate area for the pocket was chosen, and the incision was anesthetized with lidocaine. A 4 cm transverse skin incision was made. Subcutaneous tissue was divided with Bovie electrocautery.  The   catheter was measured for length appropriately and cut. Additional local was   administered for the pocket for the port and then the port pocket was created   with a mixture of Bovie electrocautery and blunt dissection. The port was  tunneled from the incision to the cannulation site with the tunneling device. The port was secured to the investing pectoral fascia with two 3-0 vicryl sutures. Under fluoroscopic guidance, the split sheath and dilator were placed over the guidewire, and the guidewire and dilator were then removed. The catheter was placed down the split sheath and the sheath was split and peeled away. Fluoroscopy confirmed appropriate position of the catheter, which aspirated and flushed easily. Heparinized saline was instilled in the catheter. The skin incision was closed in layers with deep buried interrupted 3-0 Vicryl and  running subcuticular 4-0 Monocryl. The cannulation incision was closed with a buried interrupted 4-0 Monocryl stitch. A sterile dressing was applied. The patient was transported to the Recovery Room in stable condition. All sponge, instrument and needle counts were correct at the end of the procedure.

## 2017-05-16 ENCOUNTER — DOCUMENTATION ONLY (OUTPATIENT)
Dept: INFUSION THERAPY | Facility: HOSPITAL | Age: 77
End: 2017-05-16

## 2017-05-16 NOTE — PROGRESS NOTES
Patient was presented by Dr. Matson and discussed at the Multi-Disciplinary Tumor Conference at [facility] on 5/11/17. The recommendation was to obtain a MUGA scan to determine true EF. If ok, treat with 6 cycles of CHOP-R. If EF decreased, use etoposide instead of doxorubicin.

## 2017-05-17 LAB
BONE MARROW IRON STAIN COMMENT: NORMAL
BONE MARROW WRIGHT STAIN COMMENT: NORMAL

## 2017-05-18 ENCOUNTER — OFFICE VISIT (OUTPATIENT)
Dept: CARDIOLOGY | Facility: CLINIC | Age: 77
End: 2017-05-18
Payer: MEDICARE

## 2017-05-18 VITALS
BODY MASS INDEX: 28.85 KG/M2 | DIASTOLIC BLOOD PRESSURE: 78 MMHG | HEART RATE: 96 BPM | WEIGHT: 169 LBS | HEIGHT: 64 IN | SYSTOLIC BLOOD PRESSURE: 136 MMHG

## 2017-05-18 DIAGNOSIS — Z95.5 S/P CORONARY ARTERY STENT PLACEMENT: ICD-10-CM

## 2017-05-18 DIAGNOSIS — I25.10 CORONARY ARTERY DISEASE INVOLVING NATIVE CORONARY ARTERY OF NATIVE HEART WITHOUT ANGINA PECTORIS: ICD-10-CM

## 2017-05-18 DIAGNOSIS — E78.5 HYPERLIPIDEMIA, UNSPECIFIED HYPERLIPIDEMIA TYPE: Chronic | ICD-10-CM

## 2017-05-18 DIAGNOSIS — C83.33 DIFFUSE LARGE B-CELL LYMPHOMA OF INTRA-ABDOMINAL LYMPH NODES: ICD-10-CM

## 2017-05-18 DIAGNOSIS — I25.10 CAD, MULTIPLE VESSEL: Chronic | ICD-10-CM

## 2017-05-18 DIAGNOSIS — Z95.0 PACEMAKER: ICD-10-CM

## 2017-05-18 DIAGNOSIS — I10 ESSENTIAL HYPERTENSION: Chronic | ICD-10-CM

## 2017-05-18 DIAGNOSIS — C85.90 LYMPHOMA, UNSPECIFIED BODY REGION, UNSPECIFIED LYMPHOMA TYPE: Primary | ICD-10-CM

## 2017-05-18 LAB
BODY SITE - BONE MARROW: NORMAL
CLINICAL DIAGNOSIS - BONE MARROW: NORMAL
FLOW CYTOMETRY ANTIBODIES ANALYZED - BONE MARROW: NORMAL
FLOW CYTOMETRY COMMENT - BONE MARROW: NORMAL
FLOW CYTOMETRY INTERPRETATION - BONE MARROW: NORMAL

## 2017-05-18 PROCEDURE — 1160F RVW MEDS BY RX/DR IN RCRD: CPT | Mod: S$GLB,,, | Performed by: INTERNAL MEDICINE

## 2017-05-18 PROCEDURE — 99212 OFFICE O/P EST SF 10 MIN: CPT | Mod: S$GLB,,, | Performed by: INTERNAL MEDICINE

## 2017-05-18 PROCEDURE — 1126F AMNT PAIN NOTED NONE PRSNT: CPT | Mod: S$GLB,,, | Performed by: INTERNAL MEDICINE

## 2017-05-18 PROCEDURE — 3075F SYST BP GE 130 - 139MM HG: CPT | Mod: S$GLB,,, | Performed by: INTERNAL MEDICINE

## 2017-05-18 PROCEDURE — 99999 PR PBB SHADOW E&M-EST. PATIENT-LVL III: CPT | Mod: PBBFAC,,, | Performed by: INTERNAL MEDICINE

## 2017-05-18 PROCEDURE — 3078F DIAST BP <80 MM HG: CPT | Mod: S$GLB,,, | Performed by: INTERNAL MEDICINE

## 2017-05-18 PROCEDURE — 1159F MED LIST DOCD IN RCRD: CPT | Mod: S$GLB,,, | Performed by: INTERNAL MEDICINE

## 2017-05-18 NOTE — PROGRESS NOTES
Mr Messi is here for discussion regarding her lvf. I have reviewed her echo and her muga scan her lvf is normal her lvf was also normal in 2015. She  has no chf symptoms fairly active. i see no contraindication for adriamycin.she will follow with DR DIALLO,WILL OBTAIN SERIAL LVF ASSESSMENT PER HIS PROTOCOL.

## 2017-05-18 NOTE — MR AVS SNAPSHOT
[facility]  [address] [facility] [address]  Phone: [phone]  Fax: [phone]                  Violet Swenson   2017 1:15 PM   Office Visit    Description:  Female : 1940   Provider:  Nader Gil MD   Department:  [facility]           Reason for Visit     Coronary Artery Disease           Diagnoses this Visit        Comments    Lymphoma, unspecified body region, unspecified lymphoma type    -  Primary     Diffuse large B-cell lymphoma of intra-abdominal lymph nodes         Coronary artery disease involving native coronary artery of native heart without angina pectoris         Pacemaker         S/P coronary artery stent placement         CAD, multiple vessel         Hyperlipidemia, unspecified hyperlipidemia type         Essential hypertension                To Do List           Future Appointments        Provider Department Dept Phone    2017 9:30 AM LABORATORY, [address] [facility] [phone]    2017 10:20 AM Da Matson MD [facility] Hematology Oncology [phone]    2017 1:00 PM Da Matson MD [facility] - Hemotology Oncology [phone]    2017 10:20 AM Orlando Moulton MD [facility] General Surgery [phone]    2017 1:00 PM [facility], [facility] ARRHYTHMIA [facility] Arrhythmia Procedures [phone]      Goals (5 Years of Data)     None      [facility]     [facility] Nurse Care Line -  Assistance  Unless otherwise directed by your provider, please contact [facility], our nurse care line that is available for  assistance.     Registered nurses in the [facility] provide: appointment scheduling, clinical advisement, health education, and other advisory services.  Call: [phone] (toll free)               Medications           Message regarding Medications     Verify the changes and/or additions to your medication regime listed below are the same  as discussed with your clinician today.  If any of these changes or additions are incorrect, please notify your healthcare provider.        STOP taking these medications     senna-docusate 8.6-50 mg (PERICOLACE) 8.6-50 mg per tablet Take 1 tablet by mouth 2 (two) times daily.           Verify that the below list of medications is an accurate representation of the medications you are currently taking.  If none reported, the list may be blank. If incorrect, please contact your healthcare provider. Carry this list with you in case of emergency.           Current Medications     allopurinol (ZYLOPRIM) 100 MG tablet TAKE 2 TABLETS BY MOUTH DAILY    aspirin (ECOTRIN) 81 MG EC tablet Take 81 mg by mouth once daily.     clopidogrel (PLAVIX) 75 mg tablet Take 1 tablet (75 mg total) by mouth once daily.    COLCRYS 0.6 mg tablet TAKE 1 TABLET(0.6 MG) BY MOUTH EVERY DAY    ergocalciferol, vitamin D2, 400 unit Tab Take by mouth daily.    gabapentin (NEURONTIN) 100 MG capsule Take 100 mg by mouth 3 (three) times daily.    hydrochlorothiazide (HYDRODIURIL) 12.5 MG Tab TAKE 1 TABLET BY MOUTH ONCE DAILY    inhalation spacing device (RITEFLO AEROCHAMBER) Use as directed for inhalation.    isosorbide mononitrate (IMDUR) 30 MG 24 hr tablet Take 1 tablet (30 mg total) by mouth once daily.    levothyroxine (SYNTHROID) 75 MCG tablet TAKE 1 TABLET(75 MCG) BY MOUTH BEFORE BREAKFAST    meloxicam (MOBIC) 7.5 MG tablet TAKE 1 TABLET BY MOUTH ONCE DAILY AS NEEDED FOR PAIN    rosuvastatin (CRESTOR) 10 MG tablet Take 1 tablet (10 mg total) by mouth once daily.    sertraline (ZOLOFT) 100 MG tablet Take 1 tablet (100 mg total) by mouth once daily.    turmeric root extract 500 mg Cap Take 500 mg by mouth 2 (two) times daily.    verapamil (CALAN-SR) 120 MG CR tablet TAKE 1/2 TABLET BY MOUTH NIGHTLY    ZINC ACETATE ORAL 250 mg.    albuterol 90 mcg/actuation inhaler Inhale 1-2 puffs into the lungs every 6 (six) hours as needed for Wheezing. Rescue     "alprazolam (XANAX) 0.25 MG tablet Take 1 tablet (0.25 mg total) by mouth 2 (two) times daily as needed for Anxiety.    fluticasone (FLONASE) 50 mcg/actuation nasal spray 2 sprays by Each Nare route once daily.    hydrocodone-acetaminophen 5-325mg (NORCO) 5-325 mg per tablet Take 1 tablet by mouth every 4 (four) hours as needed.    hydrocodone-acetaminophen 5-325mg (NORCO) 5-325 mg per tablet Take 1 tablet by mouth every 6 (six) hours as needed for Pain.    nitroglycerin 400 mcg/spray SprA Place onto the tongue.    ondansetron (ZOFRAN-ODT) 8 MG TbDL Take 1 tablet (8 mg total) by mouth every 8 (eight) hours as needed (Nausea).           Clinical Reference Information           Your Vitals Were     BP Pulse Height Weight BMI    136/78 (BP Location: Left arm, Patient Position: Sitting, BP Method: Manual) 96 5' 4" (1.626 m) 76.7 kg (169 lb) 29.01 kg/m2      Blood Pressure          Most Recent Value    Right Arm BP - Sitting  (P) 130/74    Left Arm BP - Sitting  (P) 136/78    BP  136/78      Allergies as of 5/18/2017     Corticosteroids (Glucocorticoids)    Oxycodone      Immunizations Administered on Date of Encounter - 5/18/2017     None      [facility] Sign-Up     Activating your [facility] account is as easy as 1-2-3!     1) Visit [email or web address], select Sign Up Now, enter this activation code and your date of birth, then select Next.  [email or web address]  Expires: 6/24/2017 11:47 AM      2) Create a username and password to use when you visit [facility] in the future and select a security question in case you lose your password and select Next.    3) Enter your e-mail address and click Sign Up!    Additional Information  If you have questions, please e-mail [email or web address] or call [phone] to talk to our [facility] staff. Remember, [facility] is NOT to be used for urgent needs. For medical emergencies, dial 911.         Language Assistance Services     ATTENTION: Language assistance services are available, free of " charge. Please call [phone].      ATENCIÓN: Si habla español, tiene a castle disposición servicios gratuitos de asistencia lingüística. Llame al [phone].     CHÚ Ý: N?u b?n nói Ti?ng Vi?t, có các d?ch v? h? tr? ngôn ng? mi?n phí dành cho b?n. G?i s? [phone].         [facility] complies with applicable Federal civil rights laws and does not discriminate on the basis of race, color, national origin, age, disability, or sex.

## 2017-05-18 NOTE — LETTER
May 18, 2017      Da Matson MD  [address] [facility] [address]           [facility]  [address] [place] [address]  Phone: [phone]  Fax: [phone]          Patient: Violet Swenson   MR Number: [id number]   YOB: 1940   Date of Visit: 5/18/2017       Dear Dr. Da Matson:    Thank you for referring Violet Swenson to me for evaluation. Attached you will find relevant portions of my assessment and plan of care.    If you have questions, please do not hesitate to call me. I look forward to following Violet Swenson along with you.    Sincerely,    Nader Gil MD    Enclosure  CC:  No Recipients    If you would like to receive this communication electronically, please contact [email or web address] or [phone] to request more information on [employer] access.    For providers and/or their staff who would like to refer a patient to [facility], please contact us through our one-stop-shop provider referral line, [facility], at [phone].    If you feel you have received this communication in error or would no longer like to receive these types of communications, please e-mail [email or web address]

## 2017-05-19 ENCOUNTER — OFFICE VISIT (OUTPATIENT)
Dept: HEMATOLOGY/ONCOLOGY | Facility: CLINIC | Age: 77
End: 2017-05-19
Payer: MEDICARE

## 2017-05-19 ENCOUNTER — LAB VISIT (OUTPATIENT)
Dept: LAB | Facility: HOSPITAL | Age: 77
End: 2017-05-19
Attending: INTERNAL MEDICINE
Payer: MEDICARE

## 2017-05-19 ENCOUNTER — SOCIAL WORK (OUTPATIENT)
Dept: HEMATOLOGY/ONCOLOGY | Facility: CLINIC | Age: 77
End: 2017-05-19

## 2017-05-19 VITALS
WEIGHT: 171.31 LBS | BODY MASS INDEX: 29.24 KG/M2 | HEART RATE: 89 BPM | SYSTOLIC BLOOD PRESSURE: 140 MMHG | DIASTOLIC BLOOD PRESSURE: 82 MMHG | OXYGEN SATURATION: 97 % | TEMPERATURE: 98 F | HEIGHT: 64 IN

## 2017-05-19 DIAGNOSIS — C85.80 LYMPHOMA MALIGNANT, LARGE CELL: ICD-10-CM

## 2017-05-19 DIAGNOSIS — R11.0 NAUSEA: Primary | ICD-10-CM

## 2017-05-19 LAB
ALBUMIN SERPL BCP-MCNC: 3.3 G/DL
ALP SERPL-CCNC: 65 U/L
ALT SERPL W/O P-5'-P-CCNC: 14 U/L
ANION GAP SERPL CALC-SCNC: 6 MMOL/L
AST SERPL-CCNC: 34 U/L
BASOPHILS # BLD AUTO: 0.03 K/UL
BASOPHILS NFR BLD: 0.4 %
BILIRUB SERPL-MCNC: 0.3 MG/DL
BUN SERPL-MCNC: 18 MG/DL
CALCIUM SERPL-MCNC: 9.1 MG/DL
CHLORIDE SERPL-SCNC: 112 MMOL/L
CO2 SERPL-SCNC: 23 MMOL/L
CREAT SERPL-MCNC: 0.9 MG/DL
DIFFERENTIAL METHOD: ABNORMAL
EOSINOPHIL # BLD AUTO: 0.3 K/UL
EOSINOPHIL NFR BLD: 4 %
ERYTHROCYTE [DISTWIDTH] IN BLOOD BY AUTOMATED COUNT: 18 %
EST. GFR  (AFRICAN AMERICAN): >60 ML/MIN/1.73 M^2
EST. GFR  (NON AFRICAN AMERICAN): >60 ML/MIN/1.73 M^2
GLUCOSE SERPL-MCNC: 85 MG/DL
HCT VFR BLD AUTO: 30.8 %
HGB BLD-MCNC: 9.4 G/DL
LYMPHOCYTES # BLD AUTO: 4 K/UL
LYMPHOCYTES NFR BLD: 48.2 %
MCH RBC QN AUTO: 27.2 PG
MCHC RBC AUTO-ENTMCNC: 30.5 %
MCV RBC AUTO: 89 FL
MONOCYTES # BLD AUTO: 0.9 K/UL
MONOCYTES NFR BLD: 10.9 %
NEUTROPHILS # BLD AUTO: 3 K/UL
NEUTROPHILS NFR BLD: 36.5 %
PLATELET # BLD AUTO: 320 K/UL
PMV BLD AUTO: 9.3 FL
POTASSIUM SERPL-SCNC: 4.5 MMOL/L
PROT SERPL-MCNC: 6.3 G/DL
RBC # BLD AUTO: 3.46 M/UL
SODIUM SERPL-SCNC: 141 MMOL/L
WBC # BLD AUTO: 8.25 K/UL

## 2017-05-19 PROCEDURE — 1159F MED LIST DOCD IN RCRD: CPT | Mod: S$GLB,,, | Performed by: INTERNAL MEDICINE

## 2017-05-19 PROCEDURE — 1160F RVW MEDS BY RX/DR IN RCRD: CPT | Mod: S$GLB,,, | Performed by: INTERNAL MEDICINE

## 2017-05-19 PROCEDURE — 99215 OFFICE O/P EST HI 40 MIN: CPT | Mod: S$GLB,,, | Performed by: INTERNAL MEDICINE

## 2017-05-19 PROCEDURE — 3079F DIAST BP 80-89 MM HG: CPT | Mod: S$GLB,,, | Performed by: INTERNAL MEDICINE

## 2017-05-19 PROCEDURE — 1126F AMNT PAIN NOTED NONE PRSNT: CPT | Mod: S$GLB,,, | Performed by: INTERNAL MEDICINE

## 2017-05-19 PROCEDURE — 3077F SYST BP >= 140 MM HG: CPT | Mod: S$GLB,,, | Performed by: INTERNAL MEDICINE

## 2017-05-19 PROCEDURE — 99999 PR PBB SHADOW E&M-EST. PATIENT-LVL IV: CPT | Mod: PBBFAC,,, | Performed by: INTERNAL MEDICINE

## 2017-05-19 PROCEDURE — 99499 UNLISTED E&M SERVICE: CPT | Mod: S$GLB,,, | Performed by: INTERNAL MEDICINE

## 2017-05-19 PROCEDURE — 1157F ADVNC CARE PLAN IN RCRD: CPT | Mod: S$GLB,,, | Performed by: INTERNAL MEDICINE

## 2017-05-19 RX ORDER — PREDNISONE 50 MG/1
TABLET ORAL
Qty: 10 TABLET | Refills: 6 | Status: SHIPPED | OUTPATIENT
Start: 2017-05-19 | End: 2017-09-14

## 2017-05-19 RX ORDER — ACETAMINOPHEN 325 MG/1
650 TABLET ORAL
Status: CANCELLED | OUTPATIENT
Start: 2017-05-25

## 2017-05-19 RX ORDER — FAMOTIDINE 10 MG/ML
20 INJECTION INTRAVENOUS
Status: CANCELLED | OUTPATIENT
Start: 2017-05-25

## 2017-05-19 RX ORDER — SODIUM CHLORIDE 0.9 % (FLUSH) 0.9 %
10 SYRINGE (ML) INJECTION
Status: CANCELLED | OUTPATIENT
Start: 2017-05-25

## 2017-05-19 RX ORDER — ONDANSETRON 8 MG/1
8 TABLET, ORALLY DISINTEGRATING ORAL EVERY 12 HOURS PRN
Qty: 30 TABLET | Refills: 2 | Status: SHIPPED | OUTPATIENT
Start: 2017-05-19 | End: 2017-09-14

## 2017-05-19 RX ORDER — DOXORUBICIN HYDROCHLORIDE 2 MG/ML
50 INJECTION, SOLUTION INTRAVENOUS
Status: CANCELLED | OUTPATIENT
Start: 2017-05-25

## 2017-05-19 RX ORDER — HEPARIN 100 UNIT/ML
500 SYRINGE INTRAVENOUS
Status: CANCELLED | OUTPATIENT
Start: 2017-05-25

## 2017-05-19 NOTE — PROGRESS NOTES
"Met with pt who was referred by Dr. Matson. Missed his referral to get pt scheduled with Dr. Diggs. Went ahead and got this scheduled first, for 5/31/17 at 11:00am. She will be starting chemotherapy next week. Referrals also made to Cancer Services and [employer] as pt has expressed interest in head coverings/wigs. Patient is the matriarch of her family. She is primary caregiver of her  who has heart problems. She is also raising a 16 yr old grandson. She has a strong family history of cancer and multiple losses over the years of family members. She considers herself "tough" and usually is the one who handles everything; she is understandably anxious about how she will handle things now with cancer and chemotherapy. Listened and provided supportive counseling. Encouraged her to take things day to day. She mentioned that her PCP Dr. Coronel said if necessary to take things hour by hour. Pt was provided with SW contact info and encouraged to call as needed; will plan to f/u with her next week to be sure as many of her needs are being met as possible.   "

## 2017-05-19 NOTE — PROGRESS NOTES
Subjective:       Patient ID: Violet Swenson is a 77 y.o. female.    Chief Complaint: No chief complaint on file.    HPI This is a 77-year-old  lady who comes for follow up of hr large cell lymphoma.  She  was   admitted to the hospital in early April with diffuse abdominal pain. A CT of   the abdomen done on 2017 was reported as showing a 6.9 x 7.0 x 8.4 cm soft  tissue mass in the right lower quadrant in the terminal ileum abutting the   cecum. There was adjacent conglomerate adenopathy in the right lower quadrant   mesentery.      The patient had a colonoscopy that showed a lesion in the terminal ileum.      She underwent a right hemicolectomy and terminal ileum removal. 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  She had a Ct/PET that shows evidence of surgery and some non specific findings, but no evidence of activer disease elsewhere.  An ECHO showed normal cardiac ejection fraction, as wella s a MUGA.  She ahs seen cardiology and cleared for ADRIAMYCIN administration.  She is negative for Hep B/C and HIV  Bone marrow was negative. She is already on allopuirinol      The patient comes today by herself to discuss starting R-CHOP  ALLERGIES: Oxycodone. The chart says that she has allergic to steroids, but   says she is not.      MEDICATIONS: See MedCard.      PREVIOUS SURGERIES: Appendectomy in , tonsillectomy at age 19, gastric   bypass with incidental cholecystectomy, bilateral knee replacement in .      SOCIAL HISTORY: She is . She had two natural children, and one adopted   one. The adopted child has . She lives in [place] with her   . She smoked for two years, averaging a pack a day. She stopped 35   years ago or so. Denies any alcohol intake. She worked in [employer].      FAMILY HISTORY: Father  of colon cancer. Younger brother had leukemia that  transform into a lymphoma. Sister had pancreatic  cancer.  Review of Systems   Constitutional: Negative.    HENT: Negative.    Eyes: Negative.    Respiratory: Negative.  Negative for cough and wheezing.    Cardiovascular: Negative.  Negative for chest pain.   Gastrointestinal: Negative.    Genitourinary: Negative.    Neurological: Negative.    Psychiatric/Behavioral: Negative.        Objective:      Physical Exam   Constitutional: She is oriented to person, place, and time. She appears well-developed. No distress.   HENT:   Head: Normocephalic.   Right Ear: Tympanic membrane, external ear and ear canal normal.   Left Ear: Tympanic membrane, external ear and ear canal normal.   Nose: Nose normal. Right sinus exhibits no maxillary sinus tenderness and no frontal sinus tenderness. Left sinus exhibits no maxillary sinus tenderness and no frontal sinus tenderness.   Mouth/Throat: Oropharynx is clear and moist and mucous membranes are normal.   Teeth normal.  Gums normal.   Eyes: Conjunctivae and lids are normal. Pupils are equal, round, and reactive to light.   Neck: Normal carotid pulses, no hepatojugular reflux and no JVD present. Carotid bruit is not present. No tracheal deviation present. No thyroid mass and no thyromegaly present.   Cardiovascular: Normal rate, regular rhythm, S1 normal, S2 normal, normal heart sounds and intact distal pulses.  Exam reveals no gallop and no friction rub.    No murmur heard.  Carotid exam normal   Pulmonary/Chest: Effort normal and breath sounds normal. No accessory muscle usage. No respiratory distress. She has no wheezes. She has no rales. She exhibits no tenderness.   Abdominal: Soft. Normal appearance. She exhibits no distension and no mass. There is no splenomegaly or hepatomegaly. There is no tenderness. There is no rebound and no guarding.   Musculoskeletal: Normal range of motion. She exhibits no edema or tenderness.        Right hand: Normal.        Left hand: Normal.       Lymphadenopathy:     She has no cervical  adenopathy.     She has no axillary adenopathy.        Right: No inguinal and no supraclavicular adenopathy present.        Left: No inguinal and no supraclavicular adenopathy present.   Neurological: She is alert and oriented to person, place, and time. She has normal strength. No cranial nerve deficit. Coordination normal.   Skin: Skin is warm and dry. No rash noted. She is not diaphoretic. No cyanosis or erythema. No pallor. Nails show no clubbing.   Psychiatric: She has a normal mood and affect. Her behavior is normal. Judgment and thought content normal.       Wt Readings from Last 3 Encounters:   05/19/17 77.7 kg (171 lb 4.8 oz)   05/18/17 76.7 kg (169 lb)   05/15/17 77.2 kg (170 lb 3.1 oz)     Temp Readings from Last 3 Encounters:   05/19/17 98.4 °F (36.9 °C) (Oral)   05/15/17 97.9 °F (36.6 °C) (Temporal)   05/10/17 98.1 °F (36.7 °C) (Oral)     BP Readings from Last 3 Encounters:   05/19/17 (!) 140/82   05/18/17 136/78   05/15/17 (!) 160/74     Pulse Readings from Last 3 Encounters:   05/19/17 89   05/18/17 96   05/15/17 78       Assessment:       1. Nausea    2. Lymphoma malignant, large cell        Plan:       .  Lab Results   Component Value Date    WBC 8.25 05/19/2017    HGB 9.4 (L) 05/19/2017    HCT 30.8 (L) 05/19/2017    MCV 89 05/19/2017     05/19/2017     Lab Results   Component Value Date    CREATININE 0.9 04/26/2017     .  Lab Results   Component Value Date    ALT 10 04/26/2017    AST 23 04/26/2017    ALKPHOS 73 04/26/2017    BILITOT 0.2 04/26/2017       We discussed the fact that her case was presented at our local Tumor Board and the consensus was for her to reeive 6cycles of R-CHOP [id number]  We discussed potential side effects including nausea, vomiting, diarrhea, alopecia, mucositis, myelosuppression, tumor lysis syndromecardio-toxicity, neuro-toxicity, elevation of blood glucose, gastritis, need for Mediport, infusion reactions, reactivation of Hep B.     She will meet with Capri  Temo NP for in-depth discussion of this regimen.  We plan on starting treatment Thursday may 25. We plan on using Neulasta self injector and Elitek  Complex visit requiring 45 minutes of direct patient care, and addressing multiple complex medical issues  She will emet with social serivces, and will be asked to meet with our Clinical Psychologist

## 2017-05-22 ENCOUNTER — OFFICE VISIT (OUTPATIENT)
Dept: HEMATOLOGY/ONCOLOGY | Facility: CLINIC | Age: 77
End: 2017-05-22
Payer: MEDICARE

## 2017-05-22 DIAGNOSIS — C85.90 LYMPHOMA, UNSPECIFIED BODY REGION, UNSPECIFIED LYMPHOMA TYPE: ICD-10-CM

## 2017-05-22 DIAGNOSIS — Z71.89 ENCOUNTER FOR MEDICATION COUNSELING: ICD-10-CM

## 2017-05-22 DIAGNOSIS — C83.33 DIFFUSE LARGE B-CELL LYMPHOMA OF INTRA-ABDOMINAL LYMPH NODES: Primary | ICD-10-CM

## 2017-05-22 DIAGNOSIS — Z71.9 ENCOUNTER FOR EDUCATION: ICD-10-CM

## 2017-05-22 DIAGNOSIS — Z79.899 HIGH RISK MEDICATION USE: ICD-10-CM

## 2017-05-22 LAB
CHROM BANDING METHOD: NORMAL
CHROMOSOME ANALYSIS BM ADDITIONAL INFORMATION: NORMAL
CHROMOSOME ANALYSIS BM RELEASED BY: NORMAL
CHROMOSOME ANALYSIS BM RESULT SUMMARY: NORMAL
CLINICAL CYTOGENETICIST REVIEW: NORMAL
KARYOTYP MAR: NORMAL
REASON FOR REFERRAL (NARRATIVE): NORMAL
REF LAB TEST METHOD: NORMAL
SPECIMEN SOURCE: NORMAL
SPECIMEN: NORMAL

## 2017-05-22 PROCEDURE — 1159F MED LIST DOCD IN RCRD: CPT | Mod: S$GLB,,, | Performed by: NURSE PRACTITIONER

## 2017-05-22 PROCEDURE — 99999 PR PBB SHADOW E&M-EST. PATIENT-LVL III: CPT | Mod: PBBFAC,,, | Performed by: NURSE PRACTITIONER

## 2017-05-22 PROCEDURE — 1160F RVW MEDS BY RX/DR IN RCRD: CPT | Mod: S$GLB,,, | Performed by: NURSE PRACTITIONER

## 2017-05-22 PROCEDURE — 99499 UNLISTED E&M SERVICE: CPT | Mod: S$GLB,,, | Performed by: NURSE PRACTITIONER

## 2017-05-22 PROCEDURE — 99215 OFFICE O/P EST HI 40 MIN: CPT | Mod: S$GLB,,, | Performed by: NURSE PRACTITIONER

## 2017-05-22 NOTE — PROGRESS NOTES
HEMATOLOGY/ONCOLOGY    ONCOLOGIST: TASHA Matson MD    CC: Chemotherapy Teaching    DIAGNOSIS: Lymphoma    CHEMOTHERAPY:  Rituxan, adriamycin, cytoxan, Oncovin, neulasta and elitek and prednisone    76 y/o female presents for chemotherapy teaching. Pt given the Navigation Notebook. Explained how to use notebook. Discussed with pt rationale for chemotherapy, how works, the process of treatment, potential side effects and symptoms to report.     Reviewed specific medications and gave them a handout describing the side effects of each medication. Stressed the importance of taking her prednisone on twice a day on days 1-5 of chemotherapy.  Side effects of prednisone discussed.     Pt oriented to the unit. Instructed can bring food and entertainment. Discussed role of family members and when they can visit.     Discussed potential side effects such as:  Nausea and vomiting  Myelosuppression  Fatigue  Anorexia  Alopecia  Stomatitis  Diarrhea  Cystitis  Gastritis  Fever > 100.0  Antiemetics instructions  Skin care  Constipation  Rash  Hyperpigmentation  Rash  Photosensitivity  Sunscreen  Small freq meals  Increased protein  Increased calories  Vitamin support   Taste alterations  Neuropathys  Hydration  Renal toxicity  Port management  Community resources  Thrombocytopenia precautions  Hand and foot syndrome- symptoms and self care tips  Importance of monitoring blood sugars if diabetic and reporting elevations or lows  Reporting red urine if persists more than 2 days if receiving Adriamycin only    Pt verbalized understanding of the information given.    Verbalizes understanding of contact information during and after clinic hours. Pt listened and asked appropriate questions.     Community and social resources brought to her attention. Time spent with pt and family was 60 minutes face to face with 100% of time spent educating and counseling. Please see further documentation in the pt education flow sheet.     Capri  Temo RN, MSN, NP-C

## 2017-05-23 ENCOUNTER — TELEPHONE (OUTPATIENT)
Dept: HEMATOLOGY/ONCOLOGY | Facility: CLINIC | Age: 77
End: 2017-05-23

## 2017-05-23 DIAGNOSIS — Z95.0 CARDIAC PACEMAKER IN SITU: Primary | ICD-10-CM

## 2017-05-23 DIAGNOSIS — I44.2 CHB (COMPLETE HEART BLOCK): ICD-10-CM

## 2017-05-23 DIAGNOSIS — M15.9 PRIMARY OSTEOARTHRITIS INVOLVING MULTIPLE JOINTS: ICD-10-CM

## 2017-05-23 RX ORDER — MELOXICAM 7.5 MG/1
TABLET ORAL
Qty: 90 TABLET | Refills: 0 | Status: SHIPPED | OUTPATIENT
Start: 2017-05-23 | End: 2017-08-22 | Stop reason: SDUPTHER

## 2017-05-23 NOTE — TELEPHONE ENCOUNTER
----- Message from Da Matson MD sent at 5/23/2017  6:59 AM CDT -----  Apprently she has an appointment with me today to discuss the bone marrow report. The bone marrow was negative. please call her and let her know she does not need to come in today. Keep appointment for start of chemotherapy as scheduled  DR MATSON

## 2017-05-25 ENCOUNTER — OFFICE VISIT (OUTPATIENT)
Dept: HEMATOLOGY/ONCOLOGY | Facility: CLINIC | Age: 77
End: 2017-05-25
Payer: MEDICARE

## 2017-05-25 VITALS
RESPIRATION RATE: 20 BRPM | WEIGHT: 174.38 LBS | DIASTOLIC BLOOD PRESSURE: 90 MMHG | HEIGHT: 64 IN | HEART RATE: 100 BPM | OXYGEN SATURATION: 99 % | TEMPERATURE: 98 F | SYSTOLIC BLOOD PRESSURE: 158 MMHG | BODY MASS INDEX: 29.77 KG/M2

## 2017-05-25 DIAGNOSIS — C83.33 DIFFUSE LARGE B-CELL LYMPHOMA OF INTRA-ABDOMINAL LYMPH NODES: Primary | ICD-10-CM

## 2017-05-25 DIAGNOSIS — B00.1 HERPES LABIALIS: ICD-10-CM

## 2017-05-25 PROCEDURE — 99499 UNLISTED E&M SERVICE: CPT | Mod: S$GLB,,, | Performed by: INTERNAL MEDICINE

## 2017-05-25 PROCEDURE — 99214 OFFICE O/P EST MOD 30 MIN: CPT | Mod: S$GLB,,, | Performed by: INTERNAL MEDICINE

## 2017-05-25 PROCEDURE — 1159F MED LIST DOCD IN RCRD: CPT | Mod: S$GLB,,, | Performed by: INTERNAL MEDICINE

## 2017-05-25 PROCEDURE — 99999 PR PBB SHADOW E&M-EST. PATIENT-LVL IV: CPT | Mod: PBBFAC,,, | Performed by: INTERNAL MEDICINE

## 2017-05-25 PROCEDURE — 1126F AMNT PAIN NOTED NONE PRSNT: CPT | Mod: S$GLB,,, | Performed by: INTERNAL MEDICINE

## 2017-05-25 RX ORDER — FAMCICLOVIR 500 MG/1
500 TABLET ORAL 3 TIMES DAILY
Qty: 10 TABLET | Refills: 0 | Status: SHIPPED | OUTPATIENT
Start: 2017-05-25 | End: 2017-06-01 | Stop reason: CLARIF

## 2017-05-25 NOTE — PROGRESS NOTES
Subjective:       Patient ID: Violet Swenson is a 77 y.o. female.    Chief Complaint: No chief complaint on file.    HPI This is a 77-year-old  lady who comes for follow up of hr large cell lymphoma.  She  was   admitted to the hospital in early April with diffuse abdominal pain. A CT of   the abdomen done on 2017 was reported as showing a 6.9 x 7.0 x 8.4 cm soft  tissue mass in the right lower quadrant in the terminal ileum abutting the   cecum. There was adjacent conglomerate adenopathy in the right lower quadrant   mesentery.     The patient had a colonoscopy that showed a lesion in the terminal ileum.     She underwent a right hemicolectomy and terminal ileum removal. 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  She had a Ct/PET that shows evidence of surgery and some non specific findings, but no evidence of activer disease elsewhere.  An ECHO showed normal cardiac ejection fraction, as wella s a MUGA.  She ahs seen cardiology and cleared for ADRIAMYCIN administration.  She is negative for Hep B/C and HIV  Bone marrow was negative. She is already on allopuirinol     The patient comes today to star  R-CHOP  She ahs developed herpes labialis  ALLERGIES: Oxycodone. The chart says that she has allergic to steroids, but   says she is not.     MEDICATIONS: See MedCard.     PREVIOUS SURGERIES: Appendectomy in , tonsillectomy at age 19, gastric   bypass with incidental cholecystectomy, bilateral knee replacement in .     SOCIAL HISTORY: She is . She had two natural children, and one adopted   one. The adopted child has . She lives in [place] with her   . She smoked for two years, averaging a pack a day. She stopped 35   years ago or so. Denies any alcohol intake. She worked in [employer].     FAMILY HISTORY: Father  of colon cancer. Younger brother had leukemia that  transform into a lymphoma. Sister had pancreatic  cancer.  Review of Systems   Constitutional: Negative.    HENT: Negative.    Eyes: Negative.    Respiratory: Negative.  Negative for cough and wheezing.    Cardiovascular: Negative.  Negative for chest pain.   Gastrointestinal: Negative.    Genitourinary: Negative.    Skin:        Herpes labialis   Neurological: Negative.    Psychiatric/Behavioral: Negative.        Objective:      Physical Exam   Constitutional: She is oriented to person, place, and time. She appears well-developed. No distress.   HENT:   Head: Normocephalic.   Right Ear: Tympanic membrane, external ear and ear canal normal.   Left Ear: Tympanic membrane, external ear and ear canal normal.   Nose: Nose normal. Right sinus exhibits no maxillary sinus tenderness and no frontal sinus tenderness. Left sinus exhibits no maxillary sinus tenderness and no frontal sinus tenderness.   Mouth/Throat: Oropharynx is clear and moist and mucous membranes are normal.   Teeth normal.  Gums normal.   Eyes: Conjunctivae and lids are normal. Pupils are equal, round, and reactive to light.   Neck: Normal carotid pulses, no hepatojugular reflux and no JVD present. Carotid bruit is not present. No tracheal deviation present. No thyroid mass and no thyromegaly present.   Cardiovascular: Normal rate, regular rhythm, S1 normal, S2 normal, normal heart sounds and intact distal pulses.  Exam reveals no gallop and no friction rub.    No murmur heard.  Carotid exam normal   Pulmonary/Chest: Effort normal and breath sounds normal. No accessory muscle usage. No respiratory distress. She has no wheezes. She has no rales. She exhibits no tenderness.   Abdominal: Soft. Normal appearance. She exhibits no distension and no mass. There is no splenomegaly or hepatomegaly. There is no tenderness. There is no rebound and no guarding.   Musculoskeletal: Normal range of motion. She exhibits no edema or tenderness.        Right hand: Normal.        Left hand: Normal.       Lymphadenopathy:      She has no cervical adenopathy.     She has no axillary adenopathy.        Right: No inguinal and no supraclavicular adenopathy present.        Left: No inguinal and no supraclavicular adenopathy present.   Neurological: She is alert and oriented to person, place, and time. She has normal strength. No cranial nerve deficit. Coordination normal.   Skin: Skin is warm and dry. No rash noted. She is not diaphoretic. No cyanosis or erythema. No pallor. Nails show no clubbing.   Active lesions of herpes labialis   Psychiatric: She has a normal mood and affect. Her behavior is normal. Judgment and thought content normal.       Wt Readings from Last 3 Encounters:   05/25/17 79.1 kg (174 lb 6.1 oz)   05/19/17 77.7 kg (171 lb 4.8 oz)   05/18/17 76.7 kg (169 lb)     Temp Readings from Last 3 Encounters:   05/25/17 97.7 °F (36.5 °C)   05/19/17 98.4 °F (36.9 °C) (Oral)   05/15/17 97.9 °F (36.6 °C) (Temporal)     BP Readings from Last 3 Encounters:   05/25/17 (!) 158/90   05/19/17 (!) 140/82   05/18/17 136/78     Pulse Readings from Last 3 Encounters:   05/25/17 100   05/19/17 89   05/18/17 96       Assessment:       1. Diffuse large B-cell lymphoma of intra-abdominal lymph nodes    2. Herpes labialis        Lab Results   Component Value Date    CREATININE 0.9 05/19/2017     Lab Results   Component Value Date    ALT 14 05/19/2017    AST 34 05/19/2017    ALKPHOS 65 05/19/2017    BILITOT 0.3 05/19/2017       Lab Results   Component Value Date    WBC 8.25 05/19/2017    HGB 9.4 (L) 05/19/2017    HCT 30.8 (L) 05/19/2017    MCV 89 05/19/2017     05/19/2017       Plan:       Td that i am concerned that if she starts chemo today, this may get a lot worse.  She  will prescribed famvir 500 mg once a day x 7 days and see me and nurses in 7 days.  Consent signed

## 2017-05-29 ENCOUNTER — TELEPHONE (OUTPATIENT)
Dept: HEMATOLOGY/ONCOLOGY | Facility: CLINIC | Age: 77
End: 2017-05-29

## 2017-05-29 NOTE — TELEPHONE ENCOUNTER
SW called to confirm f/u appt with Dr. Diggs for Wednesday, May 31 at 11 AM. Pt stated she will be able to make this appt.

## 2017-05-31 ENCOUNTER — INITIAL CONSULT (OUTPATIENT)
Dept: HEMATOLOGY/ONCOLOGY | Facility: CLINIC | Age: 77
End: 2017-05-31
Payer: MEDICARE

## 2017-05-31 DIAGNOSIS — F43.29 ADJUSTMENT DISORDER WITH EMOTIONAL DISTURBANCE: ICD-10-CM

## 2017-05-31 PROCEDURE — 99999 PR PBB SHADOW E&M-EST. PATIENT-LVL I: CPT | Mod: PBBFAC,,, | Performed by: PSYCHOLOGIST

## 2017-05-31 PROCEDURE — 90791 PSYCH DIAGNOSTIC EVALUATION: CPT | Mod: S$GLB,,, | Performed by: PSYCHOLOGIST

## 2017-05-31 NOTE — LETTER
"June 2, 2017        Da aMtson MD  [address]  [place] [address]             [facility]  [address] [place] [address]  Phone: [phone]  Fax: [phone]   Patient: Violet Swenson   MR Number: [id number]   YOB: 1940   Date of Visit: 5/31/2017       Dear Dr. Matson:    Thank you for referring Violet Swenson to me for evaluation. Below are the relevant portions of my assessment and plan of care.    Violet Swenson is a 77 y.o. female referred by Dr. Matson for psychological evaluation and treatment.  Mrs. Swenson describes significant anger, anxiety and depression related to her cancer diagnosis and its likely interference with her ability to care of other family members (son with cancer, daughter with COPD). She is currently taking Zoloft and states it was helpful until the dose was decreased at the direction of her cardiologist.  She is interested in increased psychotropic support.  She is interested in occasional supportive psychotherapy during her oncology treatment course and will follow up with me for that purpose, as needed. She has set the following additional goals:  1. Increased use of biking for exercise  2. Change perspective when judging herself ("would I  someone else for this behavior?")  3. Explore psychotropic options with cardiologist/internist     If you have questions, please do not hesitate to call me. I look forward to following Violet along with you.    Sincerely,      Mayank Diggs, PhD           CC  MD Nader Mireles MD       "

## 2017-06-01 ENCOUNTER — INFUSION (OUTPATIENT)
Dept: INFUSION THERAPY | Facility: HOSPITAL | Age: 77
End: 2017-06-01
Attending: INTERNAL MEDICINE
Payer: MEDICARE

## 2017-06-01 ENCOUNTER — OFFICE VISIT (OUTPATIENT)
Dept: HEMATOLOGY/ONCOLOGY | Facility: CLINIC | Age: 77
End: 2017-06-01
Payer: MEDICARE

## 2017-06-01 ENCOUNTER — SOCIAL WORK (OUTPATIENT)
Dept: HEMATOLOGY/ONCOLOGY | Facility: CLINIC | Age: 77
End: 2017-06-01

## 2017-06-01 VITALS — SYSTOLIC BLOOD PRESSURE: 133 MMHG | DIASTOLIC BLOOD PRESSURE: 74 MMHG | HEART RATE: 80 BPM

## 2017-06-01 VITALS
DIASTOLIC BLOOD PRESSURE: 76 MMHG | SYSTOLIC BLOOD PRESSURE: 123 MMHG | HEART RATE: 78 BPM | BODY MASS INDEX: 29.4 KG/M2 | WEIGHT: 172.19 LBS | TEMPERATURE: 98 F | OXYGEN SATURATION: 99 % | HEIGHT: 64 IN

## 2017-06-01 DIAGNOSIS — C83.33 DIFFUSE LARGE B-CELL LYMPHOMA OF INTRA-ABDOMINAL LYMPH NODES: Primary | ICD-10-CM

## 2017-06-01 DIAGNOSIS — C85.80 LYMPHOMA MALIGNANT, LARGE CELL: Primary | ICD-10-CM

## 2017-06-01 PROCEDURE — 96411 CHEMO IV PUSH ADDL DRUG: CPT

## 2017-06-01 PROCEDURE — 96417 CHEMO IV INFUS EACH ADDL SEQ: CPT

## 2017-06-01 PROCEDURE — 96415 CHEMO IV INFUSION ADDL HR: CPT

## 2017-06-01 PROCEDURE — 25000003 PHARM REV CODE 250: Performed by: INTERNAL MEDICINE

## 2017-06-01 PROCEDURE — 63600175 PHARM REV CODE 636 W HCPCS: Performed by: INTERNAL MEDICINE

## 2017-06-01 PROCEDURE — 99999 PR PBB SHADOW E&M-EST. PATIENT-LVL IV: CPT | Mod: PBBFAC,,, | Performed by: INTERNAL MEDICINE

## 2017-06-01 PROCEDURE — 1159F MED LIST DOCD IN RCRD: CPT | Mod: S$GLB,,, | Performed by: INTERNAL MEDICINE

## 2017-06-01 PROCEDURE — 99215 OFFICE O/P EST HI 40 MIN: CPT | Mod: 25,S$GLB,, | Performed by: INTERNAL MEDICINE

## 2017-06-01 PROCEDURE — 96375 TX/PRO/DX INJ NEW DRUG ADDON: CPT

## 2017-06-01 PROCEDURE — 96413 CHEMO IV INFUSION 1 HR: CPT

## 2017-06-01 PROCEDURE — 96377 APPLICATON ON-BODY INJECTOR: CPT

## 2017-06-01 PROCEDURE — 96367 TX/PROPH/DG ADDL SEQ IV INF: CPT

## 2017-06-01 PROCEDURE — 1126F AMNT PAIN NOTED NONE PRSNT: CPT | Mod: S$GLB,,, | Performed by: INTERNAL MEDICINE

## 2017-06-01 PROCEDURE — 99499 UNLISTED E&M SERVICE: CPT | Mod: S$PBB,,, | Performed by: INTERNAL MEDICINE

## 2017-06-01 RX ORDER — PREDNISONE 50 MG/1
50 TABLET ORAL ONCE
Status: COMPLETED | OUTPATIENT
Start: 2017-06-01 | End: 2017-06-01

## 2017-06-01 RX ORDER — FAMOTIDINE 20 MG/50ML
20 INJECTION, SOLUTION INTRAVENOUS
Status: COMPLETED | OUTPATIENT
Start: 2017-06-01 | End: 2017-06-01

## 2017-06-01 RX ORDER — ACETAMINOPHEN 325 MG/1
650 TABLET ORAL
Status: COMPLETED | OUTPATIENT
Start: 2017-06-01 | End: 2017-06-01

## 2017-06-01 RX ORDER — PALONOSETRON 0.05 MG/ML
0.25 INJECTION, SOLUTION INTRAVENOUS
Status: COMPLETED | OUTPATIENT
Start: 2017-06-01 | End: 2017-06-01

## 2017-06-01 RX ORDER — PREDNISONE 1 MG/1
50 TABLET ORAL ONCE
Status: CANCELLED
Start: 2017-06-01

## 2017-06-01 RX ORDER — MULTIVITAMIN
1 TABLET ORAL DAILY
COMMUNITY

## 2017-06-01 RX ORDER — PALONOSETRON 0.05 MG/ML
0.25 INJECTION, SOLUTION INTRAVENOUS
Status: CANCELLED
Start: 2017-06-01

## 2017-06-01 RX ORDER — HEPARIN 100 UNIT/ML
500 SYRINGE INTRAVENOUS
Status: DISCONTINUED | OUTPATIENT
Start: 2017-06-01 | End: 2017-06-01 | Stop reason: HOSPADM

## 2017-06-01 RX ORDER — DOXORUBICIN HYDROCHLORIDE 2 MG/ML
50 INJECTION, SOLUTION INTRAVENOUS
Status: COMPLETED | OUTPATIENT
Start: 2017-06-01 | End: 2017-06-01

## 2017-06-01 RX ADMIN — SODIUM CHLORIDE 6 MG: 9 INJECTION, SOLUTION INTRAVENOUS at 10:06

## 2017-06-01 RX ADMIN — ACETAMINOPHEN 650 MG: 325 TABLET ORAL at 10:06

## 2017-06-01 RX ADMIN — CYCLOPHOSPHAMIDE 1410 MG: 1 INJECTION, POWDER, FOR SOLUTION INTRAVENOUS; ORAL at 04:06

## 2017-06-01 RX ADMIN — DOXORUBICIN HYDROCHLORIDE 94 MG: 2 INJECTION, SOLUTION INTRAVENOUS at 03:06

## 2017-06-01 RX ADMIN — HEPARIN 500 UNITS: 100 SYRINGE at 05:06

## 2017-06-01 RX ADMIN — PREDNISONE 50 MG: 50 TABLET ORAL at 04:06

## 2017-06-01 RX ADMIN — PEGFILGRASTIM 6 MG: KIT SUBCUTANEOUS at 04:06

## 2017-06-01 RX ADMIN — PALONOSETRON HYDROCHLORIDE 0.25 MG: 0.25 INJECTION INTRAVENOUS at 10:06

## 2017-06-01 RX ADMIN — DIPHENHYDRAMINE HYDROCHLORIDE 50 MG: 50 INJECTION, SOLUTION INTRAMUSCULAR; INTRAVENOUS at 11:06

## 2017-06-01 RX ADMIN — DEXAMETHASONE SODIUM PHOSPHATE: 4 INJECTION, SOLUTION INTRA-ARTICULAR; INTRALESIONAL; INTRAMUSCULAR; INTRAVENOUS; SOFT TISSUE at 10:06

## 2017-06-01 RX ADMIN — FAMOTIDINE 20 MG: 20 INJECTION, SOLUTION INTRAVENOUS at 11:06

## 2017-06-01 RX ADMIN — VINCRISTINE SULFATE 2 MG: 1 INJECTION, SOLUTION INTRAVENOUS at 04:06

## 2017-06-01 RX ADMIN — RITUXIMAB 705 MG: 10 INJECTION, SOLUTION INTRAVENOUS at 12:06

## 2017-06-01 NOTE — PROGRESS NOTES
Updated home medication list and allergies. Discussed that the patient should verify with her physicians if she should continue the tumeric root during chemotherapy.    Discussed RCHOP chemotherapy, premedication, and take-home medications and reviewed side effects including neutropenia, anemia, thrombocytopenia, infusion reaction, extravasation, red urine, cardiotoxicity, constipation, peripheral neuropathy, headache, and nausea/vomiting.

## 2017-06-01 NOTE — PATIENT INSTRUCTIONS
[facility]  [address]  [address]  [phone] phone     [phone] fax  Hours of Operation: Monday- Friday 8:00am- 5:00pm  After hours phone  [phone]  Hematology / Oncology Physicians on call      Dr. Tr Valdovinos    Please call with any concerns regarding your appointment today.  HOME CARE AFTER CHEMOTHERAPY   Meals   Many patients feel sick and lose their appetites during treatment. Eat small meals several times a day. Choose bland foods with little taste or smell if you have problems with nausea. Be sure to cook all food thoroughly. This kills bacteria and helps you avoid intestinal infection. Soft foods are easier to swallow and digest.   Activity   Exercise keeps you strong and keeps your heart and lungs active. Talk to your doctor about an appropriate exercise program for you.   Skin Care   To prevent a skin infection, bathe or shower once a day. Use a moisturizing soap and wash with warm water. Avoid very hot or cold water. Chemotherapy can make your skin dry . Apply moisturizing lotion to help relieve dry skin. Some drugs used in high doses can cause slight burns to appear (like sunburn). Ask for a special cream to help relieve the burn and protect your skin.   Prevent Mouth Sores   During chemotherapy, many people get mouth sores. Do the following to help prevent mouth sores or to ease discomfort.   Brush your teeth with a soft-bristle toothbrush after every meal.  Don't use dental floss if your platelet count is below 50,000. Your doctor or nurse will tell you if this is the case.  Use an oral swab or special soft toothbrush if your gums bleed during regular brushing.  Use mouthwash as directed. If you can't tolerate commercial mouthwash, use salt and baking soda to clean your mouth. Mix 1 teaspoon of salt and 1 teaspoon of baking soda into a glass of water. Swish and spit.  Call your doctor or return  to this facility if you develop any of the following:   Sore throat   White patches in the mouth or throat   Fever of 100.4ºF (38ºC) or higher, or as directed by your healthcare provider  © 2000-2011 [facility], [address] [place] [address]. All rights reserved. This information is not intended as a substitute for professional medical care. Always follow your healthcare professional's   FALL PREVENTION   Falls often occur due to slipping, tripping or losing your balance. Here are ways to reduce your risk of falling again.   Was there anything that caused your fall that can be fixed, removed or replaced?   Make your home safe by keeping walkways clear of objects you may trip over.   Use non-slip pads under rugs.   Do not walk in poorly lit areas.   Do not stand on chairs or wobbly ladders.   Use caution when reaching overhead or looking upward. This position can cause a loss of balance.   Be sure your shoes fit properly, have non-slip bottoms and are in good condition.   Be cautious when going up and down stairs, curbs, and when walking on uneven sidewalks.   If your balance is poor, consider using a cane or walker.   If your fall was related to alcohol use, stop or limit alcohol intake.   If your fall was related to use of sleeping medicines, talk to your doctor about this. You may need to reduce your dosage at bedtime if you awaken during the night to go to the bathroom.   To reduce the need for nighttime bathroom trips:   Avoid drinking fluids for several hours before going to bed   Empty your bladder before going to bed   Men can keep a urinal at the bedside   © 2000-2011 [facility], [address] [place] [address]. All rights reserved. This information is not intended as a substitute for professional medical care. Always follow your healthcare professional's instructions.  WAYS TO HELP PREVENT INFECTION         WASH YOUR HANDS OFTEN DURING THE DAY, ESPECIALLY BEFORE YOU EAT,  AFTER USING THE BATHROOM, AND AFTER TOUCHING ANIMALS     STAY AWAY FROM PEOPLE WHO HAVE ILLNESSES YOU CAN CATCH; SUCH AS COLDS, FLU, CHICKEN POX     TRY TO AVOID CROWDS     STAY AWAY FROM CHILDREN WHO RECENTLY HAVE RECEIVED LIVE VIRUS VACCINES     MAINTAIN GOOD MOUTH CARE     DO NOT SQUEEZE OR SCRATCH PIMPLES     CLEAN CUTS & SCRAPES RIGHT AWAY AND DAILY UNTIL HEALED WITH WARM WATER, SOAP & AN ANTISEPTIC     AVOID CONTACT WITH LITTER BOXES, BIRD CAGES, & FISH TANKS     AVOID STANDING WATER, IE., BIRD BATHS, FLOWER POTS/VASES, OR HUMIDIFIERS     WEAR GLOVES WHEN GARDENING OR CLEANING UP AFTER OTHERS, ESPECIALLY BABIES & SMALL CHILDREN    DO NOT EAT RAW FISH, SEAFOOD, MEAT, OR EGGSYOU HAVE STARTED ON CHEMOTHERAPY. IF YOU EXPERIENCE ANY OF THE FOLLOWING PROBLEMS, CALL THE OFFICE IMMEDIATELY.    *FEVER .0 OR GREATER    *CHILLS, ESPECIALLY SHAKING CHILLS, OR SWEATING    *A SEVERE COUGH OR SORE THROAT, OR SINUS PAIN/     PRESSURE    *REDNESS, SWELLING, OR TENDERNESS AROUND A WOUND,     SORE, PIMPLE, RECTAL AREA, OR IV SITE    *SORES OR ULCERS IN THE MOUTH    *BLISTERS ON THE LIPS OR SKIN    *FREQUENT URGENCY TO URINATE OR A BURNING FEELING   WHEN YOU URINATE    *BLOOD IN THE URINE OR STOOL    *ANY UNEXPLAINED BRUISING OR PROLONGED BLEEDING,     (NOSEBLEEDS OR BLEEDING GUMS)    *LOOSE BOWEL MOVEMENTS THAT DO NOT RESPOND TO     IMODIUM OR MORE THAN THREE TIMES A DAY    *VOMITING UNRESPONSIVE TO ANTINAUSEA MEDICINE    *ANY UNUSUAL PHYSICAL SYMPTOMS THAT BEGAN AFTER     CHEMOTHERAPY     DURING WEEKDAYS, CALL AND ASK TO SPEAK DIRECTLY TO A NURSE.  AT OTHER TIMES, CALL THE OFFICE PHONE NUMBER; THE ANSWERING SERVICE WILL CONTACT THE ON-CALL PHYSICIAN.  SOMEONE IS AVAILABLE 24 HOURS A DAY, SEVEN DAYS A WEEK.

## 2017-06-01 NOTE — PLAN OF CARE
Problem: Patient Care Overview  Goal: Plan of Care Review  Outcome: Ongoing (interventions implemented as appropriate)  im just so anxious about this treatment and I have a son that has cancer too

## 2017-06-01 NOTE — NURSING
OBI neulasta film viewed by patient.  She states understanding and verifies intent to comply with instructions.     Pt ambulating around chemo infusion room . States has restless legs and is enjoying her movement around the room 235pm

## 2017-06-01 NOTE — PROGRESS NOTES
"Intent of today's meeting was to complete full NP eval; however, pt had a lot she wanted to talk about and therefore SW listened and allowed pt to process feelings, primarily about shifting role(s) while in treatment.     Advance Directives reviewed and she asked for SW help completing; unable to fully complete today as some information is needed about her POA representatives. Will f/u about this at a next appt.     Pt has a cancer policy through [facility] and asked about getting copies of her bills and other pertinent info that the policy will require. SW and  can assist with this.    As of now financial assistance does not appear to be needed. SW will continue to follow pt as her biggest need appears to be of emotional support. She did see the psychologist yesterday and intends to f/u with her again in the future ("once my hair falls out").     She was provided with SW contact info and encouraged her to call any time needs arise.  "

## 2017-06-01 NOTE — PROGRESS NOTES
Subjective:       Patient ID: Violet Swenson is a 77 y.o. female.    Chief Complaint: No chief complaint on file.    HPI This is a 77-year-old  lady who comes for follow up of hr large cell lymphoma.  She  was   admitted to the hospital in early April with diffuse abdominal pain. A CT of   the abdomen done on 2017 was reported as showing a 6.9 x 7.0 x 8.4 cm soft  tissue mass in the right lower quadrant in the terminal ileum abutting the   cecum. There was adjacent conglomerate adenopathy in the right lower quadrant   mesentery.     The patient had a colonoscopy that showed a lesion in the terminal ileum.     She underwent a right hemicolectomy and terminal ileum removal. 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  She had a Ct/PET that shows evidence of surgery and some non specific findings, but no evidence of activer disease elsewhere.  An ECHO showed normal cardiac ejection fraction, as wella s a MUGA.  She ahs seen cardiology and cleared for ADRIAMYCIN administration.  She is negative for Hep B/C and HIV  Bone marrow was negative. She is already on allopuirinol     The patient comes today to start  R-CHOP. Treatmetn alst qweek was psotponed for a week due to fever blisters  These have resolved  She ahs developed herpes labialis  ALLERGIES: Oxycodone. The chart says that she has allergic to steroids, but   says she is not.     MEDICATIONS: See MedCard.     PREVIOUS SURGERIES: Appendectomy in , tonsillectomy at age 19, gastric   bypass with incidental cholecystectomy, bilateral knee replacement in .     SOCIAL HISTORY: She is . She had two natural children, and one adopted   one. The adopted child has . She lives in [place] with her   . She smoked for two years, averaging a pack a day. She stopped 35   years ago or so. Denies any alcohol intake. She worked in [employer].     FAMILY HISTORY: Father  of colon  cancer. Younger brother had leukemia that  transform into a lymphoma. Sister had pancreatic cancer.  Review of Systems   Constitutional: Negative.    HENT: Negative.    Eyes: Negative.    Respiratory: Negative.  Negative for cough and wheezing.    Cardiovascular: Negative.  Negative for chest pain.   Gastrointestinal: Negative.    Genitourinary: Negative.    Neurological: Negative.    Psychiatric/Behavioral: Negative.        Objective:      Physical Exam   Constitutional: She is oriented to person, place, and time. She appears well-developed. No distress.   HENT:   Head: Normocephalic.   Right Ear: Tympanic membrane, external ear and ear canal normal.   Left Ear: Tympanic membrane, external ear and ear canal normal.   Nose: Nose normal. Right sinus exhibits no maxillary sinus tenderness and no frontal sinus tenderness. Left sinus exhibits no maxillary sinus tenderness and no frontal sinus tenderness.   Mouth/Throat: Oropharynx is clear and moist and mucous membranes are normal.   Teeth normal.  Gums normal.   Eyes: Conjunctivae and lids are normal. Pupils are equal, round, and reactive to light.   Neck: Normal carotid pulses, no hepatojugular reflux and no JVD present. Carotid bruit is not present. No tracheal deviation present. No thyroid mass and no thyromegaly present.   Cardiovascular: Normal rate, regular rhythm, S1 normal, S2 normal, normal heart sounds and intact distal pulses.  Exam reveals no gallop and no friction rub.    No murmur heard.  Carotid exam normal   Pulmonary/Chest: Effort normal and breath sounds normal. No accessory muscle usage. No respiratory distress. She has no wheezes. She has no rales. She exhibits no tenderness.   Abdominal: Soft. Normal appearance. She exhibits no distension and no mass. There is no splenomegaly or hepatomegaly. There is no tenderness. There is no rebound and no guarding.   Musculoskeletal: Normal range of motion. She exhibits no edema or tenderness.        Right  hand: Normal.        Left hand: Normal.       Lymphadenopathy:     She has no cervical adenopathy.     She has no axillary adenopathy.        Right: No inguinal and no supraclavicular adenopathy present.        Left: No inguinal and no supraclavicular adenopathy present.   Neurological: She is alert and oriented to person, place, and time. She has normal strength. No cranial nerve deficit. Coordination normal.   Skin: Skin is warm and dry. No rash noted. She is not diaphoretic. No cyanosis or erythema. No pallor. Nails show no clubbing.   Psychiatric: She has a normal mood and affect. Her behavior is normal. Judgment and thought content normal.     .  Wt Readings from Last 3 Encounters:   06/01/17 78.1 kg (172 lb 2.9 oz)   05/25/17 79.1 kg (174 lb 6.1 oz)   05/19/17 77.7 kg (171 lb 4.8 oz)     Temp Readings from Last 3 Encounters:   06/01/17 97.7 °F (36.5 °C) (Oral)   05/25/17 97.7 °F (36.5 °C)   05/19/17 98.4 °F (36.9 °C) (Oral)     BP Readings from Last 3 Encounters:   06/01/17 123/76   05/25/17 (!) 158/90   05/19/17 (!) 140/82     Pulse Readings from Last 3 Encounters:   06/01/17 78   05/25/17 100   05/19/17 89       Assessment:       1. Lymphoma malignant, large cell        Plan:       Lab Results   Component Value Date    WBC 8.25 05/19/2017    HGB 9.4 (L) 05/19/2017    HCT 30.8 (L) 05/19/2017    MCV 89 05/19/2017     05/19/2017     Lab Results   Component Value Date    CREATININE 0.9 05/19/2017     Lab Results   Component Value Date    ALT 14 05/19/2017    AST 34 05/19/2017    ALKPHOS 65 05/19/2017    BILITOT 0.3 05/19/2017       Start R-chop.  Neulasta self injector will be given. See me may 12 with a cbc     Feverinstructions reviewed

## 2017-06-02 PROBLEM — F43.29 ADJUSTMENT DISORDER WITH EMOTIONAL DISTURBANCE: Status: ACTIVE | Noted: 2017-06-02

## 2017-06-02 NOTE — PROGRESS NOTES
PSYCHO-ONCOLOGY INTAKE    Diagnostic Interview - CPT 04294    Date: 5/31/2017  Site: [facility]    Evaluation Length (direct face-to-face time):  1 hour     Referral Source: Oncologist: TASHA Matson MD   PCP: Marti Coronel MD    Clinical status of patient: Outpatient    Violet Swenson, a 77 y.o. female, seen for initial evaluation visit.  Met with patient.    Chief complaint/reason for encounter: adjustment to illness, depression and anger    Medical/Surgical History:    Patient Active Problem List   Diagnosis    Essential hypertension    Hyperlipidemia    Hypothyroidism (acquired)    Major depression, chronic    CAD, multiple vessel    Arthritis    S/P coronary artery stent placement    Pacemaker    Renal insufficiency    NSAID long-term use    Idiopathic chronic gout of multiple sites without tophus    Acute idiopathic gout of left foot    Primary osteoarthritis involving multiple joints    Neuropathic pain of right foot    Coronary artery disease involving native coronary artery of native heart without angina pectoris    Stenosing tenosynovitis of finger of left hand    Myofascial pain    Abdominal pain    Ischemic cardiomyopathy    Diffuse large B cell lymphoma    Chronic anemia    S/P gastric bypass    Lymphoma    Adjustment disorder with emotional disturbance       Health Behaviors:       ETOH Use: No      Tobacco Use: No (former smoker)   Illicit Drug Use:  No     Prescription Misuse:No   Caffeine: minimal   Exercise:The patient engages in little, if any physical activity.     Family History:   Psychiatric illness: Yes (son-depression)   Alcohol/Drug Abuse: Yes (son-drugs)    Suicide: No      Past Psychiatric History:   Inpatient treatment: No     Outpatient treatment: No     Prior substance abuse treatment: No     Suicide Attempts: No     Psychotropic Medications:Past: None     Current: Xanax and Zoloft     Current medications below, allergies reviewed in  chart.    Current Outpatient Prescriptions:     albuterol 90 mcg/actuation inhaler, Inhale 1-2 puffs into the lungs every 6 (six) hours as needed for Wheezing. Rescue, Disp: 18 g, Rfl: 0    allopurinol (ZYLOPRIM) 100 MG tablet, TAKE 2 TABLETS BY MOUTH DAILY, Disp: 180 tablet, Rfl: 0    alprazolam (XANAX) 0.25 MG tablet, Take 1 tablet (0.25 mg total) by mouth 2 (two) times daily as needed for Anxiety., Disp: 60 tablet, Rfl: 5    ascorbic acid, vitamin C, (VITAMIN C) 100 MG tablet, Take by mouth once daily., Disp: , Rfl:     aspirin (ECOTRIN) 81 MG EC tablet, Take 81 mg by mouth nightly. , Disp: , Rfl:     clopidogrel (PLAVIX) 75 mg tablet, Take 1 tablet (75 mg total) by mouth once daily. (Patient taking differently: Take 75 mg by mouth nightly. ), Disp: 90 tablet, Rfl: 3    COLCRYS 0.6 mg tablet, TAKE 1 TABLET(0.6 MG) BY MOUTH EVERY DAY, Disp: 30 tablet, Rfl: 3    ergocalciferol, vitamin D2, 400 unit Tab, Take 1 tablet by mouth once daily. , Disp: , Rfl:     fluticasone (FLONASE) 50 mcg/actuation nasal spray, 2 sprays by Each Nare route 2 (two) times daily as needed for Allergies. , Disp: , Rfl:     gabapentin (NEURONTIN) 100 MG capsule, Take 300 mg by mouth every evening. , Disp: , Rfl:     hydrochlorothiazide (HYDRODIURIL) 12.5 MG Tab, TAKE 1 TABLET BY MOUTH ONCE DAILY, Disp: 90 tablet, Rfl: 1    inhalation spacing device (RITEFLO AEROCHAMBER), Use as directed for inhalation., Disp: 1 Device, Rfl: 0    isosorbide mononitrate (IMDUR) 30 MG 24 hr tablet, Take 1 tablet (30 mg total) by mouth once daily. (Patient taking differently: Take 30 mg by mouth nightly. ), Disp: 90 tablet, Rfl: 3    levothyroxine (SYNTHROID) 75 MCG tablet, TAKE 1 TABLET(75 MCG) BY MOUTH BEFORE BREAKFAST, Disp: 90 tablet, Rfl: 1    meloxicam (MOBIC) 7.5 MG tablet, TAKE 1 TABLET BY MOUTH EVERY DAY AS NEEDED FOR PAIN (Patient taking differently: TAKE 1 TABLET BY MOUTH DAILY FOR PAIN), Disp: 90 tablet, Rfl: 0    multivitamin (ONE  "DAILY MULTIVITAMIN) per tablet, Take 1 tablet by mouth once daily., Disp: , Rfl:     nitroglycerin 400 mcg/spray SprA, Place 1 spray under the tongue every 5 (five) minutes as needed for Chest pain. , Disp: , Rfl:     ondansetron (ZOFRAN-ODT) 8 MG TbDL, Take 1 tablet (8 mg total) by mouth every 12 (twelve) hours as needed (Nausea)., Disp: 30 tablet, Rfl: 2    predniSONE (DELTASONE) 50 MG Tab, One tablet po bid (Patient taking differently: Take 50 mg by mouth 2 (two) times daily. One tablet po bid for 5 days starting on day 1 of each [facility] chemotherapy cycle), Disp: 10 tablet, Rfl: 6    rosuvastatin (CRESTOR) 10 MG tablet, Take 1 tablet (10 mg total) by mouth once daily. (Patient taking differently: Take 10 mg by mouth every evening. ), Disp: 30 tablet, Rfl: 6    sertraline (ZOLOFT) 100 MG tablet, Take 1 tablet (100 mg total) by mouth once daily., Disp: 90 tablet, Rfl: 3    turmeric root extract 500 mg Cap, Take 500 mg by mouth 2 (two) times daily., Disp: , Rfl:     verapamil (CALAN-SR) 120 MG CR tablet, TAKE 1/2 TABLET BY MOUTH NIGHTLY, Disp: 30 tablet, Rfl: 6    ZINC ACETATE ORAL, Take 250 mg by mouth once daily. , Disp: , Rfl:   No current facility-administered medications for this visit.      CAM Therapies: None     Screening:    Vandana: Denies Psychosis: Denies Panic Disorder: Denies Social/specific phobia: Denies       Social situation/Stressors: Violet Swenson lives with her  (Lenin) in [facility].  She is a former  (now retired).  Violet Swenson has been  2x (currently for 17 years; 1st marriage 30 years).  Mrs. Swenson has 2 living children (Eric, Leah). She had 2 other children who are . Her siblings are . The patient reports minimal social support other than 1 close friend  ("I am always the one who is supposed to support everyone else").   Additional stressors:  Marital strain; Daughter diagnosed with COPD, son diagnosed with " "prostate cancer, grandson living with them until recently because his mother "o.d.'d while in [place] and left him by himself"  Strengths and liabilities: Strength: Patient is expressive/articulate., Strength: Patient is intelligent., Strength: Patient is stable.    Current Evaluation:     Mental Status Exam: Violet Swenson arrived promptly for the assessment session. The patient was fully cooperative throughout the interview and was an adequate historian   Appearance: age appropriate, casually dressed, adequately groomed  Behavior/Cooperation: friendly and cooperative  Speech: Not pressured, clearly audible, no slurring   Mood: angry, anxious, depressed  Affect: agitated  Thought Process: goal-directed, logical  Thought Content: normal, no suicidality, no homicidality, delusions, or paranoia;did not appear to be responding to internal stimuli during the interview.   Orientation: grossly intact  Memory: Grossly intact  Attention Span/Concentration: Attends to interview without distraction; reports no difficulty  Fund of Knowledge: average  Estimate of Intelligence: above average from verbal skills and history  Cognition: grossly intact  Insight: patient has awareness of illness; good insight into own behavior and behavior of others  Judgment: the patient's behavior is adequate to circumstances    Pain: 0    History of present illness: Mrs. Swenson is a 77-year-old  lady diagnosed with large cell lymphoma after admission to the hospital in early April with diffuse abdominal pain led to the discovery of an abdominal mass. Mrs. Swenson is struggling to cope with her illness and the impact it will have on her family (see sx below).  Illness related stressors include inability to meet (perceived) family responsibilities.  Patient is coping through active coping strategies.  She has engaged in appropriate information-gathering, is well-bonded to her [facility] treatment team, and describes no barriers to care. " "She reports having very little social support and is especially angry at the lack of support provided by her  (see below).     Symptoms:   · Mood: depressed mood, diminished interest, insomnia, psychomotor agitation, fatigue, worthlessness/guilt, poor concentration, thoughts of death, tearfulness and social isolation; prior depressive symptoms with bereavement (sister  several years ago), Zoloft "helps, but my cardiologist told me to only take half the dose";  PHQ-9=21  · Anxiety: decreased memory, excessive anxiety/worry, restlessness/keyed up, irritability and muscle tension; "afraid I'll lose myself," "afraid I won't be able to make decisions," "afraid they will delay chemo again because of fever blisters"; anxiety over appearance changes; anxiety over decreased ability to support grand daughter's schooling due to illness ROMAN-7=18  · Anger: Irritability, hostility toward others, uncharacteristic anger toward others  · Substance abuse: denied  · Cognitive functioning: denied  · Health behaviors: noncontributory  · Sleep: sleep onset/maintenance difficulty; "up until 2 or 3 AM sometimes," "2 or 3 days with hardly any sleep," no reported apneic events; no naps; no restless legs; no caffeine/stimulants; no sleep hygiene considerations;  (+) use of OTC/melatonin/hypnotics/benzodiazepines (Xanax, "but it is not sufficient")  · Marital discord:  Patient reports that her  is very resentful of her needs (redirects toward his health conditions) and is minimally helpful; he was a poor caretaker when she had her knees replaced,  She has made appropriate plans for daily needs while going through chemotherapy; she is angry and resentful about his behavior      Assessment - Diagnosis - Goals:       ICD-10-CM ICD-9-CM   1. Adjustment disorder with emotional disturbance F43.29 309.29     Plan:individual psychotherapy and medication management by physician    Summary and Recommendations  Violet Swenson " "is a 77 y.o. female referred by Dr. Matson for psychological evaluation and treatment.  Mrs. Swenson describes significant anger, anxiety and depression related to her cancer diagnosis and its likely interference with her ability to care of other family members (son with cancer, daughter with COPD). She is currently taking Zoloft and states it was helpful until the dose was decreased at the direction of her cardiologist.  She is interested in increased psychotropic support.  She is interested in occasional supportive psychotherapy during her oncology treatment course and will follow up with me for that purpose, as needed. She has set the following additional goals:  1. Increased use of biking for exercise  2. Change perspective when judging herself ("would I  someone else for this behavior?")  3. Explore psychotropic options with cardiologist/internist     Mayank Granados, PhD  Clinical Psychologist  [place] License #[id number]        "

## 2017-06-08 ENCOUNTER — CLINICAL SUPPORT (OUTPATIENT)
Dept: CARDIOLOGY | Facility: CLINIC | Age: 77
End: 2017-06-08
Payer: MEDICARE

## 2017-06-08 ENCOUNTER — LAB VISIT (OUTPATIENT)
Dept: LAB | Facility: HOSPITAL | Age: 77
End: 2017-06-08
Attending: INTERNAL MEDICINE
Payer: MEDICARE

## 2017-06-08 ENCOUNTER — TELEPHONE (OUTPATIENT)
Dept: HEMATOLOGY/ONCOLOGY | Facility: CLINIC | Age: 77
End: 2017-06-08

## 2017-06-08 ENCOUNTER — OFFICE VISIT (OUTPATIENT)
Dept: HEMATOLOGY/ONCOLOGY | Facility: CLINIC | Age: 77
End: 2017-06-08
Payer: MEDICARE

## 2017-06-08 VITALS
SYSTOLIC BLOOD PRESSURE: 160 MMHG | HEIGHT: 64 IN | WEIGHT: 173.06 LBS | RESPIRATION RATE: 12 BRPM | OXYGEN SATURATION: 99 % | HEART RATE: 90 BPM | TEMPERATURE: 98 F | BODY MASS INDEX: 29.55 KG/M2 | DIASTOLIC BLOOD PRESSURE: 100 MMHG

## 2017-06-08 DIAGNOSIS — Z95.0 CARDIAC PACEMAKER IN SITU: ICD-10-CM

## 2017-06-08 DIAGNOSIS — C83.33 DIFFUSE LARGE B-CELL LYMPHOMA OF INTRA-ABDOMINAL LYMPH NODES: Primary | ICD-10-CM

## 2017-06-08 DIAGNOSIS — I44.2 CHB (COMPLETE HEART BLOCK): ICD-10-CM

## 2017-06-08 DIAGNOSIS — D69.6 THROMBOCYTOPENIA: ICD-10-CM

## 2017-06-08 DIAGNOSIS — S40.022A TRAUMATIC HEMATOMA OF LEFT UPPER ARM, INITIAL ENCOUNTER: ICD-10-CM

## 2017-06-08 DIAGNOSIS — C83.33 DIFFUSE LARGE B-CELL LYMPHOMA OF INTRA-ABDOMINAL LYMPH NODES: ICD-10-CM

## 2017-06-08 LAB
ANISOCYTOSIS BLD QL SMEAR: SLIGHT
BASOPHILS # BLD AUTO: ABNORMAL K/UL
BASOPHILS NFR BLD: 2 %
DACRYOCYTES BLD QL SMEAR: ABNORMAL
DIFFERENTIAL METHOD: ABNORMAL
EOSINOPHIL # BLD AUTO: ABNORMAL K/UL
EOSINOPHIL NFR BLD: 4 %
ERYTHROCYTE [DISTWIDTH] IN BLOOD BY AUTOMATED COUNT: 17.8 %
GIANT PLATELETS BLD QL SMEAR: PRESENT
HCT VFR BLD AUTO: 26.5 %
HGB BLD-MCNC: 8.3 G/DL
HYPOCHROMIA BLD QL SMEAR: ABNORMAL
LYMPHOCYTES # BLD AUTO: ABNORMAL K/UL
LYMPHOCYTES NFR BLD: 78 %
MCH RBC QN AUTO: 26.2 PG
MCHC RBC AUTO-ENTMCNC: 31.3 %
MCV RBC AUTO: 84 FL
METAMYELOCYTES NFR BLD MANUAL: 1 %
MONOCYTES # BLD AUTO: ABNORMAL K/UL
MONOCYTES NFR BLD: 2 %
NEUTROPHILS NFR BLD: 12 %
NEUTS BAND NFR BLD MANUAL: 1 %
OVALOCYTES BLD QL SMEAR: ABNORMAL
PLATELET # BLD AUTO: 98 K/UL
PLATELET BLD QL SMEAR: ABNORMAL
PMV BLD AUTO: 9.5 FL
POIKILOCYTOSIS BLD QL SMEAR: SLIGHT
RBC # BLD AUTO: 3.17 M/UL
WBC # BLD AUTO: 1.92 K/UL

## 2017-06-08 PROCEDURE — 85007 BL SMEAR W/DIFF WBC COUNT: CPT | Mod: PO

## 2017-06-08 PROCEDURE — 1159F MED LIST DOCD IN RCRD: CPT | Mod: S$GLB,,, | Performed by: INTERNAL MEDICINE

## 2017-06-08 PROCEDURE — 99999 PR PBB SHADOW E&M-EST. PATIENT-LVL IV: CPT | Mod: PBBFAC,,, | Performed by: INTERNAL MEDICINE

## 2017-06-08 PROCEDURE — 36415 COLL VENOUS BLD VENIPUNCTURE: CPT | Mod: PO

## 2017-06-08 PROCEDURE — 93280 PM DEVICE PROGR EVAL DUAL: CPT | Mod: S$GLB,,, | Performed by: INTERNAL MEDICINE

## 2017-06-08 PROCEDURE — 99215 OFFICE O/P EST HI 40 MIN: CPT | Mod: S$GLB,,, | Performed by: INTERNAL MEDICINE

## 2017-06-08 PROCEDURE — 1126F AMNT PAIN NOTED NONE PRSNT: CPT | Mod: S$GLB,,, | Performed by: INTERNAL MEDICINE

## 2017-06-08 PROCEDURE — 85027 COMPLETE CBC AUTOMATED: CPT | Mod: PO

## 2017-06-08 PROCEDURE — 99499 UNLISTED E&M SERVICE: CPT | Mod: S$PBB,,, | Performed by: INTERNAL MEDICINE

## 2017-06-08 NOTE — TELEPHONE ENCOUNTER
----- Message from Zhane Hendrix sent at 6/8/2017 11:51 AM CDT -----  Contact: Pt   Pt calling in regards to a huge bruise on left arm and having real bad diarrhea and nose bleed this morning would call on how to proceed [phone]

## 2017-06-08 NOTE — PROGRESS NOTES
Subjective:       Patient ID: Violet Swenson is a 77 y.o. female.    Chief Complaint: Diarrhea (lightheaded, weak)    HPI This is a 77-year-old  lady who comes for follow up of hr large cell lymphoma.  She  was   admitted to the hospital in early April with diffuse abdominal pain. A CT of   the abdomen done on 2017 was reported as showing a 6.9 x 7.0 x 8.4 cm soft  tissue mass in the right lower quadrant in the terminal ileum abutting the   cecum. There was adjacent conglomerate adenopathy in the right lower quadrant   mesentery.     The patient had a colonoscopy that showed a lesion in the terminal ileum.     She underwent a right hemicolectomy and terminal ileum removal. 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  She had a Ct/PET that shows evidence of surgery and some non specific findings, but no evidence of activer disease elsewhere.  An ECHO showed normal cardiac ejection fraction, as wella s a MUGA.  She ahs seen cardiology and cleared for ADRIAMYCIN administration.  She is negative for Hep B/C and HIV  Bone marrow was negative. She is already on allopuirinol     The patient comes on c1d6 of R-CHOP.  She had a brief episode of epistaxis today. She used several tissues and it stopped  She has diarrhea, and is taking Imodium. She ahs noticed a large hematoma on the biceps area of the left arm   ALLERGIES: Oxycodone. The chart says that she has allergic to steroids, but   says she is not.     MEDICATIONS: See MedCard.     PREVIOUS SURGERIES: Appendectomy in , tonsillectomy at age 19, gastric   bypass with incidental cholecystectomy, bilateral knee replacement in .     SOCIAL HISTORY: She is . She had two natural children, and one adopted   one. The adopted child has . She lives in [place] with her   . She smoked for two years, averaging a pack a day. She stopped 35   years ago or so. Denies any alcohol intake.  She worked in accounting.     FAMILY HISTORY: Father  of colon cancer. Younger brother had leukemia that  transform into a lymphoma. Sister had pancreatic cancer.  Review of Systems   Constitutional: Negative.    HENT: Negative.    Eyes: Negative.    Respiratory: Negative.  Negative for cough and wheezing.    Cardiovascular: Negative.  Negative for chest pain.   Gastrointestinal: Negative.         Has diarrhea, moderately well controled with Imodium   Genitourinary: Negative.    Musculoskeletal:        Large hematoma left biceps   Neurological: Negative.    Psychiatric/Behavioral: Negative.        Objective:      Physical Exam   Constitutional: She is oriented to person, place, and time. She appears well-developed. No distress.   HENT:   Head: Normocephalic.   Right Ear: Tympanic membrane, external ear and ear canal normal.   Left Ear: Tympanic membrane, external ear and ear canal normal.   Nose: Nose normal. Right sinus exhibits no maxillary sinus tenderness and no frontal sinus tenderness. Left sinus exhibits no maxillary sinus tenderness and no frontal sinus tenderness.   Mouth/Throat: Oropharynx is clear and moist and mucous membranes are normal.   No active bleeding from nose   Eyes: Conjunctivae and lids are normal. Pupils are equal, round, and reactive to light.   Neck: Normal carotid pulses, no hepatojugular reflux and no JVD present. Carotid bruit is not present. No tracheal deviation present. No thyroid mass and no thyromegaly present.   Cardiovascular: Normal rate, regular rhythm, S1 normal, S2 normal, normal heart sounds and intact distal pulses.  Exam reveals no gallop and no friction rub.    No murmur heard.  Carotid exam normal   Pulmonary/Chest: Effort normal and breath sounds normal. No accessory muscle usage. No respiratory distress. She has no wheezes. She has no rales. She exhibits no tenderness.   Abdominal: Soft. Normal appearance. She exhibits no distension and no mass. There is no  splenomegaly or hepatomegaly. There is no tenderness. There is no rebound and no guarding.   Musculoskeletal: Normal range of motion. She exhibits no edema or tenderness.        Right hand: Normal.        Left hand: Normal.   Large hematoma left biceps area, which corresponds to th area which would be under a blood pressure cuff    Lymphadenopathy:     She has no cervical adenopathy.     She has no axillary adenopathy.        Right: No inguinal and no supraclavicular adenopathy present.        Left: No inguinal and no supraclavicular adenopathy present.   Neurological: She is alert and oriented to person, place, and time. She has normal strength. No cranial nerve deficit. Coordination normal.   Skin: Skin is warm and dry. No rash noted. She is not diaphoretic. No cyanosis or erythema. No pallor. Nails show no clubbing.   Psychiatric: She has a normal mood and affect. Her behavior is normal. Judgment and thought content normal.       Wt Readings from Last 3 Encounters:   06/08/17 78.5 kg (173 lb 1 oz)   06/01/17 78.1 kg (172 lb 2.9 oz)   05/25/17 79.1 kg (174 lb 6.1 oz)     Temp Readings from Last 3 Encounters:   06/08/17 97.9 °F (36.6 °C) (Oral)   06/01/17 97.7 °F (36.5 °C) (Oral)   05/25/17 97.7 °F (36.5 °C)     BP Readings from Last 3 Encounters:   06/08/17 (!) 160/100   06/01/17 133/74   06/01/17 123/76     Pulse Readings from Last 3 Encounters:   06/08/17 90   06/01/17 80   06/01/17 78       Assessment:       1. Diffuse large B-cell lymphoma of intra-abdominal lymph nodes    2. Thrombocytopenia    3. Traumatic hematoma of left upper arm, initial encounter      4-diarrhea  Plan:       Lab Results   Component Value Date    WBC 1.92 (LL) 06/08/2017    HGB 8.3 (L) 06/08/2017    HCT 26.5 (L) 06/08/2017    MCV 84 06/08/2017    PLT 98 (L) 06/08/2017      Her platelet count is 98K but that should not account for any bleeding. She ahd a briref episode of epistaxis y today. It might have been related to there BP being a  little high. She will take her BP medicines.  The large hematoma on the left arm corresponds to the area covered by a blood pressure cuff. Patient tells me she had a blood pressure in that area for a long time, and was taking her pressure intermittently during the chemotherapy. She is on Plavix and ASA and that probably caused the bleeding.  She was asked to let nurses to avoid frequent BP readings while getting chemotherapy.  She will keep appointments with me in 3 days. If HGB continues to drop she might need a transfusion.  Complex visit requiring addressing multiple medical issues  Neutropenia and thrombocytopenia are expected side effects associated with the chemotherapy. Will monitor

## 2017-06-12 ENCOUNTER — OFFICE VISIT (OUTPATIENT)
Dept: HEMATOLOGY/ONCOLOGY | Facility: CLINIC | Age: 77
End: 2017-06-12
Payer: MEDICARE

## 2017-06-12 VITALS
HEART RATE: 87 BPM | HEIGHT: 64 IN | BODY MASS INDEX: 29.43 KG/M2 | OXYGEN SATURATION: 99 % | WEIGHT: 172.38 LBS | SYSTOLIC BLOOD PRESSURE: 170 MMHG | DIASTOLIC BLOOD PRESSURE: 72 MMHG | TEMPERATURE: 98 F | RESPIRATION RATE: 16 BRPM

## 2017-06-12 DIAGNOSIS — F51.01 PRIMARY INSOMNIA: ICD-10-CM

## 2017-06-12 DIAGNOSIS — D63.8 ANEMIA OF CHRONIC DISEASE: Primary | ICD-10-CM

## 2017-06-12 DIAGNOSIS — C83.33 DIFFUSE LARGE B-CELL LYMPHOMA OF INTRA-ABDOMINAL LYMPH NODES: Primary | ICD-10-CM

## 2017-06-12 PROCEDURE — 1125F AMNT PAIN NOTED PAIN PRSNT: CPT | Mod: S$GLB,,, | Performed by: INTERNAL MEDICINE

## 2017-06-12 PROCEDURE — 99499 UNLISTED E&M SERVICE: CPT | Mod: S$PBB,,, | Performed by: INTERNAL MEDICINE

## 2017-06-12 PROCEDURE — 99999 PR PBB SHADOW E&M-EST. PATIENT-LVL IV: CPT | Mod: PBBFAC,,, | Performed by: INTERNAL MEDICINE

## 2017-06-12 PROCEDURE — 99215 OFFICE O/P EST HI 40 MIN: CPT | Mod: S$GLB,,, | Performed by: INTERNAL MEDICINE

## 2017-06-12 PROCEDURE — 1159F MED LIST DOCD IN RCRD: CPT | Mod: S$GLB,,, | Performed by: INTERNAL MEDICINE

## 2017-06-12 RX ORDER — FUROSEMIDE 10 MG/ML
20 INJECTION INTRAMUSCULAR; INTRAVENOUS
Status: CANCELLED | OUTPATIENT
Start: 2017-06-12

## 2017-06-12 RX ORDER — HYDROCODONE BITARTRATE AND ACETAMINOPHEN 500; 5 MG/1; MG/1
TABLET ORAL ONCE
Status: CANCELLED | OUTPATIENT
Start: 2017-06-12 | End: 2017-06-12

## 2017-06-12 RX ORDER — ACETAMINOPHEN 325 MG/1
650 TABLET ORAL
Status: CANCELLED | OUTPATIENT
Start: 2017-06-12

## 2017-06-12 RX ORDER — DIPHENHYDRAMINE HYDROCHLORIDE 50 MG/ML
25 INJECTION INTRAMUSCULAR; INTRAVENOUS
Status: CANCELLED | OUTPATIENT
Start: 2017-06-12

## 2017-06-12 NOTE — PROGRESS NOTES
Subjective:       Patient ID: Violet Swenson is a 77 y.o. female.    Chief Complaint: No chief complaint on file.    HPI  This is a 77-year-old  lady who comes for follow up of hr large cell lymphoma.  She  was   admitted to the hospital in early April with diffuse abdominal pain. A CT of   the abdomen done on 04/05/2017 was reported as showing a 6.9 x 7.0 x 8.4 cm soft  tissue mass in the right lower quadrant in the terminal ileum abutting the   cecum. There was adjacent conglomerate adenopathy in the right lower quadrant   mesentery.     The patient had a colonoscopy that showed a lesion in the terminal ileum.     She underwent a right hemicolectomy and terminal ileum removal. 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  She had a Ct/PET that shows evidence of surgery and some non specific findings, but no evidence of activer disease elsewhere.  An ECHO showed normal cardiac ejection fraction, as wella s a MUGA.  She ahs seen cardiology and cleared for ADRIAMYCIN administration.  She is negative for Hep B/C and HIV  Bone marrow was negative. She is already on allopuirinol     The patient comes on c1d10  of R-CHOP.  She    had diarrhea, which responded  Imodium and is resolved. . She developped  a large hematoma on the biceps area of the left arm which I felt was due to her being on PLAVIX and having a blood pressure cuff take her BP reading frequently during the ad minustration of the initial treatment.  She indicates the food has an odd taste. She has headaches. She is very anxious. She is xanax 0.25 mg po bid and she is depressed. She is taking Zoloft 100 mg a day.he has seen out clinical Psychologist   ALLERGIES: Oxycodone. The chart says that she has allergic to steroids, but   says she is not.     MEDICATIONS: See MedCard.     PREVIOUS SURGERIES: Appendectomy in 1968, tonsillectomy at age 19, gastric   bypass with incidental cholecystectomy, bilateral  knee replacement in .     SOCIAL HISTORY: She is . She had two natural children, and one adopted   one. The adopted child has . She lives in [place] with her   . She smoked for two years, averaging a pack a day. She stopped 35   years ago or so. Denies any alcohol intake. She worked in [employer].     FAMILY HISTORY: Father  of colon cancer. Younger brother had leukemia that  transform into a lymphoma. Sister had pancreatic cancer.  Review of Systems   Constitutional: Negative.    HENT: Negative.    Eyes: Negative.    Respiratory: Negative.  Negative for cough and wheezing.    Cardiovascular: Negative.  Negative for chest pain.   Gastrointestinal: Negative.    Genitourinary: Negative.    Neurological: Negative.    Psychiatric/Behavioral: Negative.        Objective:      Physical Exam   Constitutional: She is oriented to person, place, and time. She appears well-developed. No distress.   HENT:   Head: Normocephalic.   Right Ear: Tympanic membrane, external ear and ear canal normal.   Left Ear: Tympanic membrane, external ear and ear canal normal.   Nose: Nose normal. Right sinus exhibits no maxillary sinus tenderness and no frontal sinus tenderness. Left sinus exhibits no maxillary sinus tenderness and no frontal sinus tenderness.   Mouth/Throat: Oropharynx is clear and moist and mucous membranes are normal.   Teeth normal.  Gums normal.   Eyes: Conjunctivae and lids are normal. Pupils are equal, round, and reactive to light.   Neck: Normal carotid pulses, no hepatojugular reflux and no JVD present. Carotid bruit is not present. No tracheal deviation present. No thyroid mass and no thyromegaly present.   Cardiovascular: Normal rate, regular rhythm, S1 normal, S2 normal, normal heart sounds and intact distal pulses.  Exam reveals no gallop and no friction rub.    No murmur heard.  Carotid exam normal   Pulmonary/Chest: Effort normal and breath sounds normal. No accessory muscle  usage. No respiratory distress. She has no wheezes. She has no rales. She exhibits no tenderness.   Abdominal: Soft. Normal appearance. She exhibits no distension and no mass. There is no splenomegaly or hepatomegaly. There is no tenderness. There is no rebound and no guarding.   Musculoskeletal: Normal range of motion. She exhibits no edema or tenderness.        Right hand: Normal.        Left hand: Normal.       Lymphadenopathy:     She has no cervical adenopathy.     She has no axillary adenopathy.        Right: No inguinal and no supraclavicular adenopathy present.        Left: No inguinal and no supraclavicular adenopathy present.   Neurological: She is alert and oriented to person, place, and time. She has normal strength. No cranial nerve deficit. Coordination normal.   Skin: Skin is warm and dry. No rash noted. She is not diaphoretic. No cyanosis or erythema. No pallor. Nails show no clubbing.   hematoma left biceps resolving   Psychiatric: She has a normal mood and affect. Her behavior is normal. Judgment and thought content normal.       Wt Readings from Last 3 Encounters:   06/12/17 78.2 kg (172 lb 6.4 oz)   06/08/17 78.5 kg (173 lb 1 oz)   06/01/17 78.1 kg (172 lb 2.9 oz)     Temp Readings from Last 3 Encounters:   06/12/17 98 °F (36.7 °C) (Oral)   06/08/17 97.9 °F (36.6 °C) (Oral)   06/01/17 97.7 °F (36.5 °C) (Oral)     BP Readings from Last 3 Encounters:   06/12/17 (!) 170/72   06/08/17 (!) 160/100   06/01/17 133/74     Pulse Readings from Last 3 Encounters:   06/12/17 87   06/08/17 90   06/01/17 80       Assessment:       1. Diffuse large B-cell lymphoma of intra-abdominal lymph nodes    2-ANEMIA CHRONIC DISEASE  3-depression/anxiety  4-headaches  Plan:       She will be given one unit packed cells tomorrow. See me Jun 22 with a cbc and a cmp to start c2  Tylenol for headaches. Will discuss with clinical psychology adjusting her depression and anxiety doses    ADDENDUM: discussed with clinical  psychology.  It was suggested to prescribe Remeron .  Remeron 15 mg HS was prescibd. Asked to hold the pm dose of Xanax

## 2017-06-13 ENCOUNTER — INFUSION (OUTPATIENT)
Dept: INFUSION THERAPY | Facility: HOSPITAL | Age: 77
End: 2017-06-13
Attending: INTERNAL MEDICINE
Payer: MEDICARE

## 2017-06-13 VITALS
TEMPERATURE: 98 F | DIASTOLIC BLOOD PRESSURE: 75 MMHG | SYSTOLIC BLOOD PRESSURE: 143 MMHG | HEART RATE: 77 BPM | RESPIRATION RATE: 20 BRPM

## 2017-06-13 DIAGNOSIS — D63.8 ANEMIA OF CHRONIC DISEASE: ICD-10-CM

## 2017-06-13 PROCEDURE — 25000003 PHARM REV CODE 250: Performed by: INTERNAL MEDICINE

## 2017-06-13 PROCEDURE — 63600175 PHARM REV CODE 636 W HCPCS: Performed by: INTERNAL MEDICINE

## 2017-06-13 PROCEDURE — 36430 TRANSFUSION BLD/BLD COMPNT: CPT

## 2017-06-13 PROCEDURE — 96375 TX/PRO/DX INJ NEW DRUG ADDON: CPT

## 2017-06-13 PROCEDURE — 96374 THER/PROPH/DIAG INJ IV PUSH: CPT

## 2017-06-13 RX ORDER — HYDROCODONE BITARTRATE AND ACETAMINOPHEN 500; 5 MG/1; MG/1
TABLET ORAL ONCE
Status: DISCONTINUED | OUTPATIENT
Start: 2017-06-13 | End: 2017-06-13 | Stop reason: HOSPADM

## 2017-06-13 RX ORDER — MIRTAZAPINE 15 MG/1
15 TABLET, ORALLY DISINTEGRATING ORAL NIGHTLY
Qty: 30 TABLET | Refills: 2 | Status: SHIPPED | OUTPATIENT
Start: 2017-06-13 | End: 2017-09-12 | Stop reason: SDUPTHER

## 2017-06-13 RX ORDER — ACETAMINOPHEN 325 MG/1
650 TABLET ORAL
Status: COMPLETED | OUTPATIENT
Start: 2017-06-13 | End: 2017-06-13

## 2017-06-13 RX ORDER — FUROSEMIDE 10 MG/ML
20 INJECTION INTRAMUSCULAR; INTRAVENOUS
Status: COMPLETED | OUTPATIENT
Start: 2017-06-13 | End: 2017-06-13

## 2017-06-13 RX ORDER — HEPARIN 100 UNIT/ML
500 SYRINGE INTRAVENOUS
Status: COMPLETED | OUTPATIENT
Start: 2017-06-13 | End: 2017-06-13

## 2017-06-13 RX ORDER — DIPHENHYDRAMINE HYDROCHLORIDE 50 MG/ML
25 INJECTION INTRAMUSCULAR; INTRAVENOUS
Status: COMPLETED | OUTPATIENT
Start: 2017-06-13 | End: 2017-06-13

## 2017-06-13 RX ADMIN — HEPARIN 500 UNITS: 100 SYRINGE at 01:06

## 2017-06-13 RX ADMIN — DIPHENHYDRAMINE HYDROCHLORIDE 25 MG: 50 INJECTION, SOLUTION INTRAMUSCULAR; INTRAVENOUS at 10:06

## 2017-06-13 RX ADMIN — ACETAMINOPHEN 650 MG: 325 TABLET ORAL at 10:06

## 2017-06-13 RX ADMIN — FUROSEMIDE 20 MG: 10 INJECTION, SOLUTION INTRAMUSCULAR; INTRAVENOUS at 01:06

## 2017-06-13 NOTE — PATIENT INSTRUCTIONS
Blood and Blood Product Transfusions for Cancer    A blood transfusion is when blood or parts of the blood are given to a person through an IV line placed in a vein. The blood and blood parts used for transfusion are called blood products. The blood usually comes from another person (donor). This sheet tells you more about how blood and blood products may be used to help treat cancer.  Understanding blood and blood parts  Blood is a fluid that flows throughout the body. It is made up of different parts that perform specific roles.  · Red blood cells (RBCs) carry oxygen throughout the body.  · White blood cells (WBCs) are part of the bodys immune system. Their main job is to help fight infections and diseases.  · Platelets are fragments of blood cells that help with clotting. When you have a cut or bruise, platelets come together to form a clot or plug. This helps to control bleeding, so you dont lose too much blood.   · Plasma is the liquid portion of blood. It carries the different types of blood cells to all the parts of the body. Plasma also carries proteins called clotting factors. Clotting factors help platelets with the clotting process.  Blood is divided into four types: A, B, AB, and O. Blood also has Rh types: positive (+) and negative (-). Any blood products you receive during a transfusion must be compatible with your blood type.  Why a transfusion may be done  Cancer can cause various problems that may need treatment with transfusions. For example:  · Cancer can affect the bone marrow. This is the soft, spongy part inside bones where most of the bodys blood cells are made. When the bone marrow is damaged or destroyed, the body cannot make enough blood cells. Without enough blood cells, the body cannot maintain its normal functions.  · Cancer can cause anemia. This condition happens when there are too few red blood cells in the body. Without enough red blood cells, the bodys tissues and organs do  not receive enough oxygen. Anemia can make you feel tired or short of breath.  · Occasionally, certain cancers can cause internal bleeding. This can lead to blood loss that can threaten your health.  Certain treatments for cancer can lower the number of healthy blood cells in the body and need the use of transfusions. These treatments include:  · Chemotherapy (chemo) uses strong medicines to help kill cancer cells. However, these medicines can also damage healthy cells, including cells in the bone marrow. This can lower your blood cell counts.  · Radiation uses high-energy X-rays to help kill cancer cells. As with chemo, this treatment can also damage healthy cells in the bone marrow. This can lower your blood cell counts.  · Surgery may be needed to remove a tumor (group of cancerous cells) in the body. The surgery can cause blood loss that needs the use of transfusions.  Types of transfusions  Depending on your needs, your healthcare provider may recommend one or more of the blood products listed below as part of your treatment plan. Your healthcare provider will explain to you how the transfusions will be given and how often they may be needed. Before receiving any blood products, you will need to have some blood drawn so your blood type can be determined. You will also need to sign a consent form acknowledging the potential risks of receiving a transfusion.  · RBC transfusions. These are most often used to treat severe anemia or blood loss. RBCs must be typed to match your blood type. Except in severe blood loss, cancer patients receive packed red blood cells without plasma. Each bag (called a unit) takes about 2 hours to infuse. During that time, nursing staff will be monitoring your temperature, pulse and blood pressure.  · Platelet transfusions. These are used if your platelet count is too low, which puts you at high risk of bleeding. Although platelets should be typed to match your blood type, it is  not needed. Platelets can be obtained in different ways:  ¨ From one donor (apheresis product)  ¨ Combined from several bags of whole blood (pooled product)  ¨ From a community donor who is specially matched (matched product)  A bag of platelets takes about 1 hour or less to infuse. As with RBCs, nurses will monitor your temperature, pulse, and blood pressure.  · Plasma (FFP) transfusions. These may be used to supply the blood with more clotting factors to help stop excess bleeding. FFP must be typed to match your blood type. One unit of plasma is taken from a unit of whole blood and is then frozen at the blood bank. Plasma is thawed when it is needed. FFP usually takes 1 to 2 hours to infuse.  · WBC transfusions. Due to the severe risks involved, these transfusions are rarely used. If there is a problem with the WBCs, your healthcare provider may recommend other treatments to help promote the growth of new WBCs.  Risks and possible complications of blood and blood product transfusions  These include the following:  · Fever and chills  · Allergic reaction (itchy skin or rash; redness, or flushing of the face)  · Chest pain  · Low blood pressure  In the rare event of receiving the wrong ABO blood type:  · Back pain  · Fast heart rate  · Low blood pressure  · Nausea  Although extremely rare, some diseases can be transmitted through blood transfusions. They include:  · Hepatitis B  · Hepatitis C  · HIV  · Bacterial infections  Learning more about the safety of blood donation and blood transfusions  Strict measures are taken to make sure that donated blood and blood products are safe before they are given to you. To learn more about where donated blood comes from and the process of screening blood, these websites may help:  · American Cancer Society, Blood Donation and Transfusion, www.cancer.org/Treatment/TreatmentsandSideEffects/TreatmentTypes/BloodProductDonationandTransfusion/index  · [employer],  Learn About Blood, www.redcrossblood.org/learn-about-blood   Date Last Reviewed: 1/1/2016  © 3450-7854 [employer]. [address] [place] [address], [place] [address]. All rights reserved. This information is not intended as a substitute for professional medical care. Always follow your healthcare professional's instructions.

## 2017-06-13 NOTE — PLAN OF CARE
Problem: Patient Care Overview  Goal: Plan of Care Review  Outcome: Ongoing (interventions implemented as appropriate)  Patient states she is doing OK, anxious about her current situation.  Plan of care , s/s transfusion reaction reviewed.  Pt verbalizes understanding.

## 2017-06-16 ENCOUNTER — OFFICE VISIT (OUTPATIENT)
Dept: INTERNAL MEDICINE | Facility: CLINIC | Age: 77
End: 2017-06-16
Payer: MEDICARE

## 2017-06-16 VITALS
OXYGEN SATURATION: 97 % | WEIGHT: 173.06 LBS | DIASTOLIC BLOOD PRESSURE: 78 MMHG | TEMPERATURE: 99 F | HEIGHT: 64 IN | BODY MASS INDEX: 29.55 KG/M2 | SYSTOLIC BLOOD PRESSURE: 138 MMHG | HEART RATE: 89 BPM

## 2017-06-16 DIAGNOSIS — M89.9 BONE DISORDER: ICD-10-CM

## 2017-06-16 DIAGNOSIS — Z13.820 ENCOUNTER FOR SCREENING FOR OSTEOPOROSIS: ICD-10-CM

## 2017-06-16 DIAGNOSIS — I10 ESSENTIAL HYPERTENSION: Primary | Chronic | ICD-10-CM

## 2017-06-16 DIAGNOSIS — E78.5 HYPERLIPIDEMIA, UNSPECIFIED HYPERLIPIDEMIA TYPE: Chronic | ICD-10-CM

## 2017-06-16 PROCEDURE — 99999 PR PBB SHADOW E&M-EST. PATIENT-LVL V: CPT | Mod: PBBFAC,,, | Performed by: NURSE PRACTITIONER

## 2017-06-16 PROCEDURE — 99499 UNLISTED E&M SERVICE: CPT | Mod: S$PBB,,, | Performed by: NURSE PRACTITIONER

## 2017-06-16 PROCEDURE — 99214 OFFICE O/P EST MOD 30 MIN: CPT | Mod: S$GLB,,, | Performed by: NURSE PRACTITIONER

## 2017-06-16 PROCEDURE — 1159F MED LIST DOCD IN RCRD: CPT | Mod: S$GLB,,, | Performed by: NURSE PRACTITIONER

## 2017-06-16 PROCEDURE — 1126F AMNT PAIN NOTED NONE PRSNT: CPT | Mod: S$GLB,,, | Performed by: NURSE PRACTITIONER

## 2017-06-16 NOTE — PROGRESS NOTES
Subjective:       Patient ID: Violet Swenson is a 77 y.o. female.    Chief Complaint: Follow-up (6 month follow up)    Patient presents to clinic today for followup of chronic conditions including HTN, HLD, and hypothyroidism. She is beginning chemo with Dr. Matson and reports fatigue but otherwise without concern.         Review of Systems   Constitutional: Positive for fatigue. Negative for chills, fever and unexpected weight change.   Eyes: Negative for visual disturbance.   Respiratory: Negative for shortness of breath.    Cardiovascular: Negative for chest pain.   Musculoskeletal: Negative for myalgias.   Neurological: Negative for headaches.       Objective:      Physical Exam   Constitutional: She is oriented to person, place, and time. She appears well-developed and well-nourished. No distress.   HENT:   Head: Normocephalic and atraumatic.   Eyes: Conjunctivae and EOM are normal. Pupils are equal, round, and reactive to light.   Cardiovascular: Normal rate and regular rhythm.  Exam reveals no gallop and no friction rub.    No murmur heard.  Pulmonary/Chest: Effort normal and breath sounds normal.   Neurological: She is alert and oriented to person, place, and time.   Skin: Skin is warm and dry.   Psychiatric: She has a normal mood and affect.   Vitals reviewed.      Assessment:       1. Essential hypertension    2. Hyperlipidemia, unspecified hyperlipidemia type    3. Encounter for screening for osteoporosis    4. Bone disorder        Plan:   Essential hypertension  Comments:  controlled, continue current medications    Hyperlipidemia, unspecified hyperlipidemia type  Comments:  followed by Dr. Gil    Encounter for screening for osteoporosis    Bone disorder  -     DXA Bone Density Spine And Hip; Future; Expected date: 10/16/2017      Patient to do vaccines after completing chemo in September. She requests DEXA done then as well.   Return if symptoms worsen or fail to improve, for annual  wellness.

## 2017-06-22 ENCOUNTER — INFUSION (OUTPATIENT)
Dept: INFUSION THERAPY | Facility: HOSPITAL | Age: 77
End: 2017-06-22
Attending: INTERNAL MEDICINE
Payer: MEDICARE

## 2017-06-22 ENCOUNTER — OFFICE VISIT (OUTPATIENT)
Dept: HEMATOLOGY/ONCOLOGY | Facility: CLINIC | Age: 77
End: 2017-06-22
Payer: MEDICARE

## 2017-06-22 VITALS
TEMPERATURE: 99 F | OXYGEN SATURATION: 97 % | BODY MASS INDEX: 29.74 KG/M2 | HEART RATE: 91 BPM | WEIGHT: 174.19 LBS | HEIGHT: 64 IN | SYSTOLIC BLOOD PRESSURE: 165 MMHG | DIASTOLIC BLOOD PRESSURE: 92 MMHG

## 2017-06-22 VITALS — DIASTOLIC BLOOD PRESSURE: 89 MMHG | SYSTOLIC BLOOD PRESSURE: 151 MMHG | HEART RATE: 95 BPM

## 2017-06-22 DIAGNOSIS — C83.33 DIFFUSE LARGE B-CELL LYMPHOMA OF INTRA-ABDOMINAL LYMPH NODES: Primary | ICD-10-CM

## 2017-06-22 DIAGNOSIS — E78.5 HYPERLIPIDEMIA, UNSPECIFIED HYPERLIPIDEMIA TYPE: Chronic | ICD-10-CM

## 2017-06-22 DIAGNOSIS — F32.9 MAJOR DEPRESSION, CHRONIC: ICD-10-CM

## 2017-06-22 DIAGNOSIS — D64.9 CHRONIC ANEMIA: ICD-10-CM

## 2017-06-22 PROCEDURE — 99499 UNLISTED E&M SERVICE: CPT | Mod: S$PBB,,, | Performed by: INTERNAL MEDICINE

## 2017-06-22 PROCEDURE — 96413 CHEMO IV INFUSION 1 HR: CPT

## 2017-06-22 PROCEDURE — 1159F MED LIST DOCD IN RCRD: CPT | Mod: S$GLB,,, | Performed by: INTERNAL MEDICINE

## 2017-06-22 PROCEDURE — 96411 CHEMO IV PUSH ADDL DRUG: CPT

## 2017-06-22 PROCEDURE — 99999 PR PBB SHADOW E&M-EST. PATIENT-LVL IV: CPT | Mod: PBBFAC,,, | Performed by: INTERNAL MEDICINE

## 2017-06-22 PROCEDURE — 96415 CHEMO IV INFUSION ADDL HR: CPT

## 2017-06-22 PROCEDURE — 25000003 PHARM REV CODE 250: Performed by: INTERNAL MEDICINE

## 2017-06-22 PROCEDURE — 96367 TX/PROPH/DG ADDL SEQ IV INF: CPT

## 2017-06-22 PROCEDURE — 96417 CHEMO IV INFUS EACH ADDL SEQ: CPT

## 2017-06-22 PROCEDURE — 63600175 PHARM REV CODE 636 W HCPCS: Performed by: INTERNAL MEDICINE

## 2017-06-22 PROCEDURE — 96377 APPLICATON ON-BODY INJECTOR: CPT

## 2017-06-22 PROCEDURE — 99215 OFFICE O/P EST HI 40 MIN: CPT | Mod: S$GLB,,, | Performed by: INTERNAL MEDICINE

## 2017-06-22 PROCEDURE — 96375 TX/PRO/DX INJ NEW DRUG ADDON: CPT

## 2017-06-22 PROCEDURE — 1125F AMNT PAIN NOTED PAIN PRSNT: CPT | Mod: S$GLB,,, | Performed by: INTERNAL MEDICINE

## 2017-06-22 RX ORDER — PALONOSETRON 0.05 MG/ML
0.25 INJECTION, SOLUTION INTRAVENOUS
Status: COMPLETED | OUTPATIENT
Start: 2017-06-22 | End: 2017-06-22

## 2017-06-22 RX ORDER — SODIUM CHLORIDE 9 MG/ML
10 INJECTION, SOLUTION INTRAMUSCULAR; INTRAVENOUS; SUBCUTANEOUS
Status: DISCONTINUED | OUTPATIENT
Start: 2017-06-22 | End: 2017-06-22 | Stop reason: HOSPADM

## 2017-06-22 RX ORDER — ACETAMINOPHEN 325 MG/1
650 TABLET ORAL
Status: CANCELLED | OUTPATIENT
Start: 2017-06-22

## 2017-06-22 RX ORDER — FAMOTIDINE 20 MG/50ML
20 INJECTION, SOLUTION INTRAVENOUS
Status: CANCELLED
Start: 2017-06-22

## 2017-06-22 RX ORDER — DOXORUBICIN HYDROCHLORIDE 2 MG/ML
50 INJECTION, SOLUTION INTRAVENOUS
Status: COMPLETED | OUTPATIENT
Start: 2017-06-22 | End: 2017-06-22

## 2017-06-22 RX ORDER — DOXORUBICIN HYDROCHLORIDE 2 MG/ML
50 INJECTION, SOLUTION INTRAVENOUS
Status: CANCELLED | OUTPATIENT
Start: 2017-06-22

## 2017-06-22 RX ORDER — HEPARIN 100 UNIT/ML
500 SYRINGE INTRAVENOUS
Status: DISCONTINUED | OUTPATIENT
Start: 2017-06-22 | End: 2017-06-22 | Stop reason: HOSPADM

## 2017-06-22 RX ORDER — PALONOSETRON 0.05 MG/ML
0.25 INJECTION, SOLUTION INTRAVENOUS
Status: CANCELLED
Start: 2017-06-22

## 2017-06-22 RX ORDER — FAMOTIDINE 20 MG/50ML
20 INJECTION, SOLUTION INTRAVENOUS
Status: COMPLETED | OUTPATIENT
Start: 2017-06-22 | End: 2017-06-22

## 2017-06-22 RX ORDER — SODIUM CHLORIDE 0.9 % (FLUSH) 0.9 %
10 SYRINGE (ML) INJECTION
Status: CANCELLED | OUTPATIENT
Start: 2017-06-22

## 2017-06-22 RX ORDER — HEPARIN 100 UNIT/ML
500 SYRINGE INTRAVENOUS
Status: CANCELLED | OUTPATIENT
Start: 2017-06-22

## 2017-06-22 RX ORDER — ACETAMINOPHEN 325 MG/1
650 TABLET ORAL
Status: COMPLETED | OUTPATIENT
Start: 2017-06-22 | End: 2017-06-22

## 2017-06-22 RX ADMIN — DIPHENHYDRAMINE HYDROCHLORIDE 50 MG: 50 INJECTION, SOLUTION INTRAMUSCULAR; INTRAVENOUS at 09:06

## 2017-06-22 RX ADMIN — RITUXIMAB 709 MG: 10 INJECTION, SOLUTION INTRAVENOUS at 10:06

## 2017-06-22 RX ADMIN — ACETAMINOPHEN 650 MG: 325 TABLET ORAL at 09:06

## 2017-06-22 RX ADMIN — DOXORUBICIN HYDROCHLORIDE 94 MG: 2 INJECTION, SOLUTION INTRAVENOUS at 01:06

## 2017-06-22 RX ADMIN — PEGFILGRASTIM 6 MG: KIT SUBCUTANEOUS at 03:06

## 2017-06-22 RX ADMIN — FAMOTIDINE 20 MG: 20 INJECTION, SOLUTION INTRAVENOUS at 09:06

## 2017-06-22 RX ADMIN — CYCLOPHOSPHAMIDE 1420 MG: 1 INJECTION, POWDER, FOR SOLUTION INTRAVENOUS; ORAL at 02:06

## 2017-06-22 RX ADMIN — HEPARIN 500 UNITS: 100 SYRINGE at 03:06

## 2017-06-22 RX ADMIN — VINCRISTINE SULFATE 2 MG: 1 INJECTION, SOLUTION INTRAVENOUS at 02:06

## 2017-06-22 RX ADMIN — DEXAMETHASONE SODIUM PHOSPHATE: 4 INJECTION, SOLUTION INTRAMUSCULAR; INTRAVENOUS at 10:06

## 2017-06-22 RX ADMIN — PALONOSETRON HYDROCHLORIDE 0.25 MG: 0.25 INJECTION INTRAVENOUS at 09:06

## 2017-06-22 NOTE — PROGRESS NOTES
Subjective:       Patient ID: Violet Swenson is a 77 y.o. female.    Chief Complaint: No chief complaint on file.    HPI This is a 77-year-old  lady who comes for follow up of hr large cell lymphoma.  She  was   admitted to the hospital in early April with diffuse abdominal pain. A CT of   the abdomen done on 2017 was reported as showing a 6.9 x 7.0 x 8.4 cm soft  tissue mass in the right lower quadrant in the terminal ileum abutting the   cecum. There was adjacent conglomerate adenopathy in the right lower quadrant   mesentery.     The patient had a colonoscopy that showed a lesion in the terminal ileum.     She underwent a right hemicolectomy and terminal ileum removal. 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  She had a Ct/PET that shows evidence of surgery and some non specific findings, but no evidence of activer disease elsewhere.  An ECHO showed normal cardiac ejection fraction, as wella s a MUGA.  She ahs seen cardiology and cleared for ADRIAMYCIN administration.  She is negative for Hep B/C and HIV  Bone marrow was negative. She is already on allopuirinol     The patient comes on c2 d1  of R-CHOP.   ALLERGIES: Oxycodone. The chart says that she has allergic to steroids, but   says she is not.     MEDICATIONS: See MedCard.     PREVIOUS SURGERIES: Appendectomy in , tonsillectomy at age 19, gastric   bypass with incidental cholecystectomy, bilateral knee replacement in .     SOCIAL HISTORY: She is . She had two natural children, and one adopted   one. The adopted child has . She lives in [place] with her   . She smoked for two years, averaging a pack a day. She stopped 35   years ago or so. Denies any alcohol intake. She worked in [employer].     FAMILY HISTORY: Father  of colon cancer. Younger brother had leukemia that  transform into a lymphoma. Sister had pancreatic cancer.  Review of Systems    Constitutional: Negative.    HENT: Negative.    Eyes: Negative.    Respiratory: Negative.  Negative for cough and wheezing.    Cardiovascular: Negative.  Negative for chest pain.   Gastrointestinal: Negative.    Genitourinary: Negative.    Neurological: Negative.    Psychiatric/Behavioral: Negative.        Objective:      Physical Exam   Constitutional: She is oriented to person, place, and time. She appears well-developed. No distress.   HENT:   Head: Normocephalic.   Right Ear: Tympanic membrane, external ear and ear canal normal.   Left Ear: Tympanic membrane, external ear and ear canal normal.   Nose: Nose normal. Right sinus exhibits no maxillary sinus tenderness and no frontal sinus tenderness. Left sinus exhibits no maxillary sinus tenderness and no frontal sinus tenderness.   Mouth/Throat: Oropharynx is clear and moist and mucous membranes are normal.   Teeth normal.  Gums normal.   Eyes: Conjunctivae and lids are normal. Pupils are equal, round, and reactive to light.   Neck: Normal carotid pulses, no hepatojugular reflux and no JVD present. Carotid bruit is not present. No tracheal deviation present. No thyroid mass and no thyromegaly present.   Cardiovascular: Normal rate, regular rhythm, S1 normal, S2 normal, normal heart sounds and intact distal pulses.  Exam reveals no gallop and no friction rub.    No murmur heard.  Carotid exam normal   Pulmonary/Chest: Effort normal and breath sounds normal. No accessory muscle usage. No respiratory distress. She has no wheezes. She has no rales. She exhibits no tenderness.   Abdominal: Soft. Normal appearance. She exhibits no distension and no mass. There is no splenomegaly or hepatomegaly. There is no tenderness. There is no rebound and no guarding.   Musculoskeletal: Normal range of motion. She exhibits no edema or tenderness.        Right hand: Normal.        Left hand: Normal.       Lymphadenopathy:     She has no cervical adenopathy.     She has no axillary  adenopathy.        Right: No inguinal and no supraclavicular adenopathy present.        Left: No inguinal and no supraclavicular adenopathy present.   Neurological: She is alert and oriented to person, place, and time. She has normal strength. No cranial nerve deficit. Coordination normal.   Skin: Skin is warm and dry. No rash noted. She is not diaphoretic. No cyanosis or erythema. No pallor. Nails show no clubbing.   Psychiatric: She has a normal mood and affect. Her behavior is normal. Judgment and thought content normal.       Wt Readings from Last 3 Encounters:   06/22/17 79 kg (174 lb 2.6 oz)   06/16/17 78.5 kg (173 lb 1 oz)   06/12/17 78.2 kg (172 lb 6.4 oz)     Temp Readings from Last 3 Encounters:   06/22/17 98.8 °F (37.1 °C) (Oral)   06/16/17 98.9 °F (37.2 °C) (Tympanic)   06/13/17 98.2 °F (36.8 °C)     BP Readings from Last 3 Encounters:   06/22/17 (!) 165/92   06/16/17 138/78   06/13/17 (!) 143/75     Pulse Readings from Last 3 Encounters:   06/22/17 91   06/16/17 89   06/13/17 77       Assessment:       1. Diffuse large B-cell lymphoma of intra-abdominal lymph nodes    2. Major depression, chronic    3. Chronic anemia        Plan:       Lab Results   Component Value Date    WBC 10.08 06/22/2017    HGB 9.3 (L) 06/22/2017    HCT 29.5 (L) 06/22/2017    MCV 87 06/22/2017     (H) 06/22/2017       Anemia better after transfusion.  Depression and insomnia better with addition of Remeron to zoloft  Will receicve c2 of R-CHOP.  See em July 7 with a cbc

## 2017-06-22 NOTE — PATIENT INSTRUCTIONS
[facility]  [address]  [address] [address]  [phone] phone     [phone] fax  Hours of Operation: Monday- Friday 8:00am- 5:00pm  After hours phone  [phone]  Hematology / Oncology Physicians on call      Dr. Tr Valdovinos    Please call with any concerns regarding your appointment today.      HOME CARE AFTER CHEMOTHERAPY   Meals   Many patients feel sick and lose their appetites during treatment. Eat small meals several times a day. Choose bland foods with little taste or smell if you have problems with nausea. Be sure to cook all food thoroughly. This kills bacteria and helps you avoid intestinal infection. Soft foods are easier to swallow and digest.   Activity   Exercise keeps you strong and keeps your heart and lungs active. Talk to your doctor about an appropriate exercise program for you.   Skin Care   To prevent a skin infection, bathe or shower once a day. Use a moisturizing soap and wash with warm water. Avoid very hot or cold water. Chemotherapy can make your skin dry . Apply moisturizing lotion to help relieve dry skin. Some drugs used in high doses can cause slight burns to appear (like sunburn). Ask for a special cream to help relieve the burn and protect your skin.   Prevent Mouth Sores   During chemotherapy, many people get mouth sores. Do the following to help prevent mouth sores or to ease discomfort.   Brush your teeth with a soft-bristle toothbrush after every meal.  Don't use dental floss if your platelet count is below 50,000. Your doctor or nurse will tell you if this is the case.  Use an oral swab or special soft toothbrush if your gums bleed during regular brushing.  Use mouthwash as directed. If you can't tolerate commercial mouthwash, use salt and baking soda to clean your mouth. Mix 1 teaspoon of salt and 1 teaspoon of baking soda into a glass of water. Swish and spit.  Call your doctor or  return to this facility if you develop any of the following:   Sore throat   White patches in the mouth or throat   Fever of 100.4ºF (38ºC) or higher, or as directed by your healthcare provider  © 5682-6171 [employer], [address] [place] [address], [place] [address]. All rights reserved. This information is not intended as a substitute for professional medical care. Always follow your healthcare professional's

## 2017-06-22 NOTE — PLAN OF CARE
Problem: Patient Care Overview  Goal: Plan of Care Review  Outcome: Ongoing (interventions implemented as appropriate)  States she has been doing well but her hair is starting to fall out.

## 2017-06-23 RX ORDER — ROSUVASTATIN CALCIUM 10 MG/1
10 TABLET, COATED ORAL NIGHTLY
Qty: 30 TABLET | Refills: 5 | Status: SHIPPED | OUTPATIENT
Start: 2017-06-23 | End: 2018-01-29 | Stop reason: SDUPTHER

## 2017-07-03 ENCOUNTER — OFFICE VISIT (OUTPATIENT)
Dept: HEMATOLOGY/ONCOLOGY | Facility: CLINIC | Age: 77
End: 2017-07-03
Payer: MEDICARE

## 2017-07-03 ENCOUNTER — LAB VISIT (OUTPATIENT)
Dept: LAB | Facility: HOSPITAL | Age: 77
End: 2017-07-03
Attending: INTERNAL MEDICINE
Payer: MEDICARE

## 2017-07-03 VITALS
DIASTOLIC BLOOD PRESSURE: 74 MMHG | SYSTOLIC BLOOD PRESSURE: 128 MMHG | RESPIRATION RATE: 18 BRPM | WEIGHT: 171.94 LBS | TEMPERATURE: 99 F | HEART RATE: 93 BPM | BODY MASS INDEX: 29.35 KG/M2 | HEIGHT: 64 IN | OXYGEN SATURATION: 99 %

## 2017-07-03 DIAGNOSIS — C83.33 DIFFUSE LARGE B-CELL LYMPHOMA OF INTRA-ABDOMINAL LYMPH NODES: Primary | ICD-10-CM

## 2017-07-03 DIAGNOSIS — T45.1X5A ANTINEOPLASTIC CHEMOTHERAPY INDUCED ANEMIA: Primary | ICD-10-CM

## 2017-07-03 DIAGNOSIS — D64.81 ANTINEOPLASTIC CHEMOTHERAPY INDUCED ANEMIA: Primary | ICD-10-CM

## 2017-07-03 DIAGNOSIS — C83.33 DIFFUSE LARGE B-CELL LYMPHOMA OF INTRA-ABDOMINAL LYMPH NODES: ICD-10-CM

## 2017-07-03 DIAGNOSIS — D64.9 CHRONIC ANEMIA: ICD-10-CM

## 2017-07-03 LAB
BASOPHILS # BLD AUTO: ABNORMAL K/UL
BASOPHILS NFR BLD: 1 %
DIFFERENTIAL METHOD: ABNORMAL
EOSINOPHIL # BLD AUTO: ABNORMAL K/UL
EOSINOPHIL NFR BLD: 2 %
ERYTHROCYTE [DISTWIDTH] IN BLOOD BY AUTOMATED COUNT: 21.8 %
HCT VFR BLD AUTO: 25.4 %
HGB BLD-MCNC: 7.9 G/DL
LYMPHOCYTES # BLD AUTO: ABNORMAL K/UL
LYMPHOCYTES NFR BLD: 17 %
MCH RBC QN AUTO: 26.4 PG
MCHC RBC AUTO-ENTMCNC: 31.1 %
MCV RBC AUTO: 85 FL
MONOCYTES # BLD AUTO: ABNORMAL K/UL
MONOCYTES NFR BLD: 10 %
NEUTROPHILS NFR BLD: 69 %
NEUTS BAND NFR BLD MANUAL: 1 %
PLATELET # BLD AUTO: 158 K/UL
PMV BLD AUTO: 10.6 FL
RBC # BLD AUTO: 2.99 M/UL
WBC # BLD AUTO: 16.98 K/UL

## 2017-07-03 PROCEDURE — 99999 PR PBB SHADOW E&M-EST. PATIENT-LVL IV: CPT | Mod: PBBFAC,,, | Performed by: INTERNAL MEDICINE

## 2017-07-03 PROCEDURE — 85007 BL SMEAR W/DIFF WBC COUNT: CPT | Mod: NCS

## 2017-07-03 PROCEDURE — 99214 OFFICE O/P EST MOD 30 MIN: CPT | Mod: S$GLB,,, | Performed by: INTERNAL MEDICINE

## 2017-07-03 PROCEDURE — 85027 COMPLETE CBC AUTOMATED: CPT

## 2017-07-03 PROCEDURE — 1159F MED LIST DOCD IN RCRD: CPT | Mod: S$GLB,,, | Performed by: INTERNAL MEDICINE

## 2017-07-03 PROCEDURE — 36415 COLL VENOUS BLD VENIPUNCTURE: CPT | Mod: PO

## 2017-07-03 PROCEDURE — 99499 UNLISTED E&M SERVICE: CPT | Mod: S$PBB,,, | Performed by: INTERNAL MEDICINE

## 2017-07-03 PROCEDURE — 1126F AMNT PAIN NOTED NONE PRSNT: CPT | Mod: S$GLB,,, | Performed by: INTERNAL MEDICINE

## 2017-07-03 RX ORDER — ACETAMINOPHEN 325 MG/1
650 TABLET ORAL
Status: CANCELLED | OUTPATIENT
Start: 2017-07-03

## 2017-07-03 RX ORDER — DIPHENHYDRAMINE HYDROCHLORIDE 50 MG/ML
25 INJECTION INTRAMUSCULAR; INTRAVENOUS
Status: CANCELLED | OUTPATIENT
Start: 2017-07-03

## 2017-07-03 RX ORDER — FUROSEMIDE 10 MG/ML
20 INJECTION INTRAMUSCULAR; INTRAVENOUS ONCE
Status: CANCELLED | OUTPATIENT
Start: 2017-07-03

## 2017-07-03 RX ORDER — HYDROCODONE BITARTRATE AND ACETAMINOPHEN 500; 5 MG/1; MG/1
TABLET ORAL ONCE
Status: CANCELLED | OUTPATIENT
Start: 2017-07-03 | End: 2017-07-03

## 2017-07-03 NOTE — PROGRESS NOTES
Subjective:       Patient ID: Violet Swenson is a 77 y.o. female.    Chief Complaint: Follow-up    HPI This is a 77-year-old  lady who comes for follow up of hr large cell lymphoma.  She  was   admitted to the hospital in early April with diffuse abdominal pain. A CT of   the abdomen done on 2017 was reported as showing a 6.9 x 7.0 x 8.4 cm soft  tissue mass in the right lower quadrant in the terminal ileum abutting the   cecum. There was adjacent conglomerate adenopathy in the right lower quadrant   mesentery.     The patient had a colonoscopy that showed a lesion in the terminal ileum.     She underwent a right hemicolectomy and terminal ileum removal. 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  She had a Ct/PET that shows evidence of surgery and some non specific findings, but no evidence of activer disease elsewhere.  An ECHO showed normal cardiac ejection fraction, as wella s a MUGA.  She ahs seen cardiology and cleared for ADRIAMYCIN administration.  She is negative for Hep B/C and HIV  Bone marrow was negative. She is already on allopuirinol     The patient comes on c2 d10  of R-CHOP.  She has had a hard time. She commplaims olf generalized weaness, pain in legs, problems with vision and problems with concentration. She believes is likely to be related to the prednisone   ALLERGIES: Oxycodone. The chart says that she has allergic to steroids, but   says she is not.     MEDICATIONS: See MedCard.     PREVIOUS SURGERIES: Appendectomy in , tonsillectomy at age 19, gastric   bypass with incidental cholecystectomy, bilateral knee replacement in .     SOCIAL HISTORY: She is . She had two natural children, and one adopted   one. The adopted child has . She lives in [place] with her   . She smoked for two years, averaging a pack a day. She stopped 35   years ago or so. Denies any alcohol intake. She worked in  accounting.     FAMILY HISTORY: Father  of colon cancer. Younger brother had leukemia that  transform into a lymphoma. Sister had pancreatic cancer.  Review of Systems    Objective:      Physical Exam   Constitutional: She is oriented to person, place, and time. She appears well-developed. No distress.   HENT:   Head: Normocephalic.   Right Ear: Tympanic membrane, external ear and ear canal normal.   Left Ear: Tympanic membrane, external ear and ear canal normal.   Nose: Nose normal. Right sinus exhibits no maxillary sinus tenderness and no frontal sinus tenderness. Left sinus exhibits no maxillary sinus tenderness and no frontal sinus tenderness.   Mouth/Throat: Oropharynx is clear and moist and mucous membranes are normal.   Teeth normal.  Gums normal.   Eyes: Conjunctivae and lids are normal. Pupils are equal, round, and reactive to light.   Neck: Normal carotid pulses, no hepatojugular reflux and no JVD present. Carotid bruit is not present. No tracheal deviation present. No thyroid mass and no thyromegaly present.   Cardiovascular: Normal rate, regular rhythm, S1 normal, S2 normal, normal heart sounds and intact distal pulses.  Exam reveals no gallop and no friction rub.    No murmur heard.  Carotid exam normal   Pulmonary/Chest: Effort normal and breath sounds normal. No accessory muscle usage. No respiratory distress. She has no wheezes. She has no rales. She exhibits no tenderness.   Abdominal: Soft. Normal appearance. She exhibits no distension and no mass. There is no splenomegaly or hepatomegaly. There is no tenderness. There is no rebound and no guarding.   Musculoskeletal: Normal range of motion. She exhibits no edema or tenderness.        Right hand: Normal.        Left hand: Normal.       Lymphadenopathy:     She has no cervical adenopathy.     She has no axillary adenopathy.        Right: No inguinal and no supraclavicular adenopathy present.        Left: No inguinal and no supraclavicular  adenopathy present.   Neurological: She is alert and oriented to person, place, and time. She has normal strength. No cranial nerve deficit. Coordination normal.   Skin: Skin is warm and dry. No rash noted. She is not diaphoretic. No cyanosis or erythema. No pallor. Nails show no clubbing.   Psychiatric: She has a normal mood and affect. Her behavior is normal. Judgment and thought content normal.       Wt Readings from Last 3 Encounters:   07/03/17 78 kg (171 lb 15.3 oz)   06/22/17 79 kg (174 lb 2.6 oz)   06/16/17 78.5 kg (173 lb 1 oz)     Temp Readings from Last 3 Encounters:   07/03/17 99 °F (37.2 °C) (Oral)   06/22/17 98.8 °F (37.1 °C) (Oral)   06/16/17 98.9 °F (37.2 °C) (Tympanic)     BP Readings from Last 3 Encounters:   07/03/17 128/74   06/22/17 (!) 151/89   06/22/17 (!) 165/92     Pulse Readings from Last 3 Encounters:   07/03/17 93   06/22/17 95   06/22/17 91       Assessment:       1. Diffuse large B-cell lymphoma of intra-abdominal lymph nodes    2. Chronic anemia        Plan:       Lab Results   Component Value Date    WBC 16.98 (H) 07/03/2017    HGB 7.9 (L) 07/03/2017    HCT 25.4 (L) 07/03/2017    MCV 85 07/03/2017     07/03/2017     Lab Results   Component Value Date    CREATININE 0.9 06/22/2017       She was told that I fell that most of the side effects are due to the chemo rather than the prednisone, but in any case, prednisone is an important part of the treatmernt.  She has a HGB of 7.9 and is very weak so we will arrange for ehr to receive one unit of packed cells.  See me July 13 with a cbc and a cmp.  Plan is for her to ha a Ct/PEt after C3

## 2017-07-05 ENCOUNTER — INFUSION (OUTPATIENT)
Dept: INFUSION THERAPY | Facility: HOSPITAL | Age: 77
End: 2017-07-05
Attending: INTERNAL MEDICINE
Payer: MEDICARE

## 2017-07-05 VITALS
RESPIRATION RATE: 20 BRPM | HEART RATE: 86 BPM | SYSTOLIC BLOOD PRESSURE: 151 MMHG | TEMPERATURE: 98 F | DIASTOLIC BLOOD PRESSURE: 85 MMHG

## 2017-07-05 DIAGNOSIS — T45.1X5A ANTINEOPLASTIC CHEMOTHERAPY INDUCED ANEMIA: ICD-10-CM

## 2017-07-05 DIAGNOSIS — M79.2 NEUROPATHIC PAIN OF FOOT, RIGHT: ICD-10-CM

## 2017-07-05 DIAGNOSIS — D64.81 ANTINEOPLASTIC CHEMOTHERAPY INDUCED ANEMIA: ICD-10-CM

## 2017-07-05 PROCEDURE — 96375 TX/PRO/DX INJ NEW DRUG ADDON: CPT

## 2017-07-05 PROCEDURE — 96374 THER/PROPH/DIAG INJ IV PUSH: CPT

## 2017-07-05 PROCEDURE — 36430 TRANSFUSION BLD/BLD COMPNT: CPT

## 2017-07-05 PROCEDURE — 63600175 PHARM REV CODE 636 W HCPCS: Performed by: INTERNAL MEDICINE

## 2017-07-05 PROCEDURE — 96376 TX/PRO/DX INJ SAME DRUG ADON: CPT

## 2017-07-05 PROCEDURE — 25000003 PHARM REV CODE 250: Performed by: INTERNAL MEDICINE

## 2017-07-05 RX ORDER — ACETAMINOPHEN 325 MG/1
650 TABLET ORAL
Status: COMPLETED | OUTPATIENT
Start: 2017-07-05 | End: 2017-07-05

## 2017-07-05 RX ORDER — FUROSEMIDE 10 MG/ML
20 INJECTION INTRAMUSCULAR; INTRAVENOUS ONCE
Status: COMPLETED | OUTPATIENT
Start: 2017-07-05 | End: 2017-07-05

## 2017-07-05 RX ORDER — HYDROCODONE BITARTRATE AND ACETAMINOPHEN 500; 5 MG/1; MG/1
TABLET ORAL ONCE
Status: DISCONTINUED | OUTPATIENT
Start: 2017-07-05 | End: 2017-07-05 | Stop reason: HOSPADM

## 2017-07-05 RX ORDER — GABAPENTIN 100 MG/1
CAPSULE ORAL
Qty: 90 CAPSULE | Refills: 3 | Status: SHIPPED | OUTPATIENT
Start: 2017-07-05 | End: 2017-09-28 | Stop reason: SDUPTHER

## 2017-07-05 RX ORDER — HEPARIN 100 UNIT/ML
500 SYRINGE INTRAVENOUS
Status: COMPLETED | OUTPATIENT
Start: 2017-07-05 | End: 2017-07-05

## 2017-07-05 RX ORDER — DIPHENHYDRAMINE HYDROCHLORIDE 50 MG/ML
25 INJECTION INTRAMUSCULAR; INTRAVENOUS
Status: COMPLETED | OUTPATIENT
Start: 2017-07-05 | End: 2017-07-05

## 2017-07-05 RX ADMIN — HEPARIN 500 UNITS: 100 SYRINGE at 02:07

## 2017-07-05 RX ADMIN — ACETAMINOPHEN 650 MG: 325 TABLET ORAL at 12:07

## 2017-07-05 RX ADMIN — DIPHENHYDRAMINE HYDROCHLORIDE 25 MG: 50 INJECTION, SOLUTION INTRAMUSCULAR; INTRAVENOUS at 12:07

## 2017-07-05 RX ADMIN — FUROSEMIDE 20 MG: 10 INJECTION, SOLUTION INTRAMUSCULAR; INTRAVENOUS at 02:07

## 2017-07-05 NOTE — PLAN OF CARE
"Problem: Patient Care Overview  Goal: Plan of Care Review  Outcome: Ongoing (interventions implemented as appropriate)  Patient states "I have been feeling very weak and tired." Blood transfusion plan of care reviewed.      "

## 2017-07-13 ENCOUNTER — INFUSION (OUTPATIENT)
Dept: INFUSION THERAPY | Facility: HOSPITAL | Age: 77
End: 2017-07-13
Attending: INTERNAL MEDICINE
Payer: MEDICARE

## 2017-07-13 ENCOUNTER — OFFICE VISIT (OUTPATIENT)
Dept: HEMATOLOGY/ONCOLOGY | Facility: CLINIC | Age: 77
End: 2017-07-13
Payer: MEDICARE

## 2017-07-13 VITALS
HEART RATE: 89 BPM | RESPIRATION RATE: 16 BRPM | BODY MASS INDEX: 29.17 KG/M2 | DIASTOLIC BLOOD PRESSURE: 98 MMHG | HEIGHT: 64 IN | TEMPERATURE: 98 F | OXYGEN SATURATION: 96 % | SYSTOLIC BLOOD PRESSURE: 152 MMHG | WEIGHT: 170.88 LBS

## 2017-07-13 VITALS
DIASTOLIC BLOOD PRESSURE: 74 MMHG | HEIGHT: 64 IN | HEART RATE: 80 BPM | BODY MASS INDEX: 29.02 KG/M2 | WEIGHT: 170 LBS | SYSTOLIC BLOOD PRESSURE: 127 MMHG

## 2017-07-13 DIAGNOSIS — C83.33 DIFFUSE LARGE B-CELL LYMPHOMA OF INTRA-ABDOMINAL LYMPH NODES: Primary | ICD-10-CM

## 2017-07-13 PROCEDURE — 96377 APPLICATON ON-BODY INJECTOR: CPT | Mod: PO

## 2017-07-13 PROCEDURE — 96375 TX/PRO/DX INJ NEW DRUG ADDON: CPT | Mod: PO

## 2017-07-13 PROCEDURE — 96413 CHEMO IV INFUSION 1 HR: CPT | Mod: PO

## 2017-07-13 PROCEDURE — 96368 THER/DIAG CONCURRENT INF: CPT | Mod: PO

## 2017-07-13 PROCEDURE — 96411 CHEMO IV PUSH ADDL DRUG: CPT | Mod: PO

## 2017-07-13 PROCEDURE — 96415 CHEMO IV INFUSION ADDL HR: CPT | Mod: PO

## 2017-07-13 PROCEDURE — 99999 PR PBB SHADOW E&M-EST. PATIENT-LVL IV: CPT | Mod: PBBFAC,,, | Performed by: INTERNAL MEDICINE

## 2017-07-13 PROCEDURE — 99214 OFFICE O/P EST MOD 30 MIN: CPT | Mod: 25,S$GLB,, | Performed by: INTERNAL MEDICINE

## 2017-07-13 PROCEDURE — 25000003 PHARM REV CODE 250: Mod: PO | Performed by: INTERNAL MEDICINE

## 2017-07-13 PROCEDURE — 99499 UNLISTED E&M SERVICE: CPT | Mod: S$PBB,,, | Performed by: INTERNAL MEDICINE

## 2017-07-13 PROCEDURE — 96417 CHEMO IV INFUS EACH ADDL SEQ: CPT | Mod: PO

## 2017-07-13 PROCEDURE — 96367 TX/PROPH/DG ADDL SEQ IV INF: CPT | Mod: PO

## 2017-07-13 PROCEDURE — 63600175 PHARM REV CODE 636 W HCPCS: Mod: PO | Performed by: INTERNAL MEDICINE

## 2017-07-13 PROCEDURE — 1126F AMNT PAIN NOTED NONE PRSNT: CPT | Mod: S$GLB,,, | Performed by: INTERNAL MEDICINE

## 2017-07-13 PROCEDURE — 1159F MED LIST DOCD IN RCRD: CPT | Mod: S$GLB,,, | Performed by: INTERNAL MEDICINE

## 2017-07-13 RX ORDER — PALONOSETRON 0.05 MG/ML
0.25 INJECTION, SOLUTION INTRAVENOUS
Status: COMPLETED | OUTPATIENT
Start: 2017-07-13 | End: 2017-07-13

## 2017-07-13 RX ORDER — ACETAMINOPHEN 325 MG/1
650 TABLET ORAL
Status: CANCELLED | OUTPATIENT
Start: 2017-07-13

## 2017-07-13 RX ORDER — ACETAMINOPHEN 325 MG/1
650 TABLET ORAL
Status: COMPLETED | OUTPATIENT
Start: 2017-07-13 | End: 2017-07-13

## 2017-07-13 RX ORDER — HEPARIN 100 UNIT/ML
500 SYRINGE INTRAVENOUS
Status: CANCELLED | OUTPATIENT
Start: 2017-07-13

## 2017-07-13 RX ORDER — SODIUM CHLORIDE 0.9 % (FLUSH) 0.9 %
10 SYRINGE (ML) INJECTION
Status: CANCELLED | OUTPATIENT
Start: 2017-07-13

## 2017-07-13 RX ORDER — DOXORUBICIN HYDROCHLORIDE 2 MG/ML
50 INJECTION, SOLUTION INTRAVENOUS
Status: CANCELLED | OUTPATIENT
Start: 2017-07-13

## 2017-07-13 RX ORDER — PALONOSETRON 0.05 MG/ML
0.25 INJECTION, SOLUTION INTRAVENOUS
Status: CANCELLED
Start: 2017-07-13

## 2017-07-13 RX ORDER — FAMOTIDINE 20 MG/50ML
20 INJECTION, SOLUTION INTRAVENOUS
Status: CANCELLED
Start: 2017-07-13

## 2017-07-13 RX ORDER — DOXORUBICIN HYDROCHLORIDE 2 MG/ML
50 INJECTION, SOLUTION INTRAVENOUS
Status: COMPLETED | OUTPATIENT
Start: 2017-07-13 | End: 2017-07-13

## 2017-07-13 RX ORDER — FAMOTIDINE 20 MG/50ML
20 INJECTION, SOLUTION INTRAVENOUS
Status: COMPLETED | OUTPATIENT
Start: 2017-07-13 | End: 2017-07-13

## 2017-07-13 RX ORDER — HEPARIN 100 UNIT/ML
500 SYRINGE INTRAVENOUS
Status: DISCONTINUED | OUTPATIENT
Start: 2017-07-13 | End: 2017-07-13 | Stop reason: HOSPADM

## 2017-07-13 RX ADMIN — DIPHENHYDRAMINE HYDROCHLORIDE 50 MG: 50 INJECTION INTRAMUSCULAR; INTRAVENOUS at 10:07

## 2017-07-13 RX ADMIN — DEXAMETHASONE SODIUM PHOSPHATE: 4 INJECTION, SOLUTION INTRA-ARTICULAR; INTRALESIONAL; INTRAMUSCULAR; INTRAVENOUS; SOFT TISSUE at 10:07

## 2017-07-13 RX ADMIN — ALTEPLASE 2 MG: 2.2 INJECTION, POWDER, LYOPHILIZED, FOR SOLUTION INTRAVENOUS at 09:07

## 2017-07-13 RX ADMIN — VINCRISTINE SULFATE 2 MG: 1 INJECTION, SOLUTION INTRAVENOUS at 01:07

## 2017-07-13 RX ADMIN — ACETAMINOPHEN 650 MG: 325 TABLET ORAL at 09:07

## 2017-07-13 RX ADMIN — CYCLOPHOSPHAMIDE 1405 MG: 1 INJECTION, POWDER, FOR SOLUTION INTRAVENOUS; ORAL at 02:07

## 2017-07-13 RX ADMIN — RITUXIMAB 701 MG: 10 INJECTION, SOLUTION INTRAVENOUS at 10:07

## 2017-07-13 RX ADMIN — DOXORUBICIN HYDROCHLORIDE 94 MG: 2 INJECTION, SOLUTION INTRAVENOUS at 01:07

## 2017-07-13 RX ADMIN — HEPARIN 500 UNITS: 100 SYRINGE at 03:07

## 2017-07-13 RX ADMIN — FAMOTIDINE 20 MG: 20 INJECTION, SOLUTION INTRAVENOUS at 10:07

## 2017-07-13 RX ADMIN — PEGFILGRASTIM 6 MG: KIT SUBCUTANEOUS at 02:07

## 2017-07-13 RX ADMIN — PALONOSETRON HYDROCHLORIDE 0.25 MG: 0.25 INJECTION INTRAVENOUS at 09:07

## 2017-07-13 NOTE — PATIENT INSTRUCTIONS
.  [facility]  [address]  [address] [address]  [phone] phone     [phone] fax  Hours of Operation: Monday- Friday 8:00am- 5:00pm  After hours phone  [phone]  Hematology / Oncology Physicians on call      Dr. Tr Valdovinos    Please call with any concerns regarding your appointment today.    .FALL PREVENTION   Falls often occur due to slipping, tripping or losing your balance. Here are ways to reduce your risk of falling again.   Was there anything that caused your fall that can be fixed, removed or replaced?   Make your home safe by keeping walkways clear of objects you may trip over.   Use non-slip pads under rugs.   Do not walk in poorly lit areas.   Do not stand on chairs or wobbly ladders.   Use caution when reaching overhead or looking upward. This position can cause a loss of balance.   Be sure your shoes fit properly, have non-slip bottoms and are in good condition.   Be cautious when going up and down stairs, curbs, and when walking on uneven sidewalks.   If your balance is poor, consider using a cane or walker.   If your fall was related to alcohol use, stop or limit alcohol intake.   If your fall was related to use of sleeping medicines, talk to your doctor about this. You may need to reduce your dosage at bedtime if you awaken during the night to go to the bathroom.   To reduce the need for nighttime bathroom trips:   Avoid drinking fluids for several hours before going to bed   Empty your bladder before going to bed   Men can keep a urinal at the bedside   © 9660-5287 [employer], [address] [place] [address]. All rights reserved. This information is not intended as a substitute for professional medical care. Always follow your healthcare professional's instructions.      .HOME CARE AFTER CHEMOTHERAPY   Meals   Many patients feel sick and lose their appetites during treatment. Eat small  meals several times a day. Choose bland foods with little taste or smell if you have problems with nausea. Be sure to cook all food thoroughly. This kills bacteria and helps you avoid intestinal infection. Soft foods are easier to swallow and digest.   Activity   Exercise keeps you strong and keeps your heart and lungs active. Talk to your doctor about an appropriate exercise program for you.   Skin Care   To prevent a skin infection, bathe or shower once a day. Use a moisturizing soap and wash with warm water. Avoid very hot or cold water. Chemotherapy can make your skin dry . Apply moisturizing lotion to help relieve dry skin. Some drugs used in high doses can cause slight burns to appear (like sunburn). Ask for a special cream to help relieve the burn and protect your skin.   Prevent Mouth Sores   During chemotherapy, many people get mouth sores. Do the following to help prevent mouth sores or to ease discomfort.   Brush your teeth with a soft-bristle toothbrush after every meal.  Don't use dental floss if your platelet count is below 50,000. Your doctor or nurse will tell you if this is the case.  Use an oral swab or special soft toothbrush if your gums bleed during regular brushing.  Use mouthwash as directed. If you can't tolerate commercial mouthwash, use salt and baking soda to clean your mouth. Mix 1 teaspoon of salt and 1 teaspoon of baking soda into a glass of water. Swish and spit.  Call your doctor or return to this facility if you develop any of the following:   Sore throat   White patches in the mouth or throat   Fever of 100.4ºF (38ºC) or higher, or as directed by your healthcare provider  © 4221-6199 [employer], [address] [place] [address], [place] [address]. All rights reserved. This information is not intended as a substitute for professional medical care. Always follow your healthcare professional's       .WAYS TO HELP PREVENT INFECTION         WASH YOUR HANDS OFTEN DURING THE DAY, ESPECIALLY  BEFORE YOU EAT, AFTER USING THE BATHROOM, AND AFTER TOUCHING ANIMALS     STAY AWAY FROM PEOPLE WHO HAVE ILLNESSES YOU CAN CATCH; SUCH AS COLDS, FLU, CHICKEN POX     TRY TO AVOID CROWDS     STAY AWAY FROM CHILDREN WHO RECENTLY HAVE RECEIVED LIVE VIRUS VACCINES     MAINTAIN GOOD MOUTH CARE     DO NOT SQUEEZE OR SCRATCH PIMPLES     CLEAN CUTS & SCRAPES RIGHT AWAY AND DAILY UNTIL HEALED WITH WARM WATER, SOAP & AN ANTISEPTIC     AVOID CONTACT WITH LITTER BOXES, BIRD CAGES, & FISH TANKS     AVOID STANDING WATER, IE., BIRD BATHS, FLOWER POTS/VASES, OR HUMIDIFIERS     WEAR GLOVES WHEN GARDENING OR CLEANING UP AFTER OTHERS, ESPECIALLY BABIES & SMALL CHILDREN     DO NOT EAT RAW FISH, SEAFOOD, MEAT, OR EGGS

## 2017-07-13 NOTE — PROGRESS NOTES
Subjective:       Patient ID: Violet Swenson is a 77 y.o. female.    Chief Complaint: Follow-up  This is a 77-year-old  lady who comes for follow up of hr large cell lymphoma.  She  was   admitted to the hospital in early April with diffuse abdominal pain. A CT of   the abdomen done on 2017 was reported as showing a 6.9 x 7.0 x 8.4 cm soft  tissue mass in the right lower quadrant in the terminal ileum abutting the   cecum. There was adjacent conglomerate adenopathy in the right lower quadrant   mesentery.     The patient had a colonoscopy that showed a lesion in the terminal ileum.     She underwent a right hemicolectomy and terminal ileum removal. 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  She had a Ct/PET that shows evidence of surgery and some non specific findings, but no evidence of activer disease elsewhere.  An ECHO showed normal cardiac ejection fraction, as wella s a MUGA.  She saw cardiology and cleared for ADRIAMYCIN administration.  She is negative for Hep B/C and HIV  Bone marrow was negative.     The patient comes on c3 d1   of R-CHOP.  She says she feels well.   ALLERGIES: Oxycodone. The chart says that she has allergic to steroids, but   says she is not.     MEDICATIONS: See MedCard.     PREVIOUS SURGERIES: Appendectomy in , tonsillectomy at age 19, gastric   bypass with incidental cholecystectomy, bilateral knee replacement in .     SOCIAL HISTORY: She is . She had two natural children, and one adopted   one. The adopted child has . She lives in [place] with her   . She smoked for two years, averaging a pack a day. She stopped 35   years ago or so. Denies any alcohol intake. She worked in [employer].     FAMILY HISTORY: Father  of colon cancer. Younger brother had leukemia that  transform into a lymphoma. Sister had pancreatic cancer.  Review of Systems    Objective:        HPI  Review of Systems    Constitutional: Negative.  Negative for appetite change and unexpected weight change.   Eyes: Positive for visual disturbance.   Respiratory: Positive for cough and shortness of breath. Negative for wheezing.    Cardiovascular: Negative.  Negative for chest pain.   Gastrointestinal: Positive for diarrhea. Negative for abdominal pain.   Genitourinary: Positive for frequency.   Musculoskeletal: Negative for back pain.   Skin: Negative for rash.   Neurological: Positive for headaches.   Hematological: Negative for adenopathy.   Psychiatric/Behavioral: The patient is nervous/anxious.        Objective:      Physical Exam   Constitutional: She is oriented to person, place, and time. She appears well-developed. No distress.   HENT:   Head: Normocephalic.   Right Ear: Tympanic membrane, external ear and ear canal normal.   Left Ear: Tympanic membrane, external ear and ear canal normal.   Nose: Nose normal. Right sinus exhibits no maxillary sinus tenderness and no frontal sinus tenderness. Left sinus exhibits no maxillary sinus tenderness and no frontal sinus tenderness.   Mouth/Throat: Oropharynx is clear and moist and mucous membranes are normal.   Teeth normal.  Gums normal.   Eyes: Conjunctivae and lids are normal. Pupils are equal, round, and reactive to light.   Neck: Normal carotid pulses, no hepatojugular reflux and no JVD present. Carotid bruit is not present. No tracheal deviation present. No thyroid mass and no thyromegaly present.   Cardiovascular: Normal rate, regular rhythm, S1 normal, S2 normal, normal heart sounds and intact distal pulses.  Exam reveals no gallop and no friction rub.    No murmur heard.  Carotid exam normal   Pulmonary/Chest: Effort normal and breath sounds normal. No accessory muscle usage. No respiratory distress. She has no wheezes. She has no rales. She exhibits no tenderness.   Abdominal: Soft. Normal appearance. She exhibits no distension and no mass. There is no splenomegaly or  hepatomegaly. There is no tenderness. There is no rebound and no guarding.   Musculoskeletal: Normal range of motion. She exhibits no edema or tenderness.        Right hand: Normal.        Left hand: Normal.       Lymphadenopathy:     She has no cervical adenopathy.     She has no axillary adenopathy.        Right: No inguinal and no supraclavicular adenopathy present.        Left: No inguinal and no supraclavicular adenopathy present.   Neurological: She is alert and oriented to person, place, and time. She has normal strength. No cranial nerve deficit. Coordination normal.   Skin: Skin is warm and dry. No rash noted. She is not diaphoretic. No cyanosis or erythema. No pallor. Nails show no clubbing.   Psychiatric: She has a normal mood and affect. Her behavior is normal. Judgment and thought content normal.       Wt Readings from Last 3 Encounters:   07/13/17 77.5 kg (170 lb 13.7 oz)   07/03/17 78 kg (171 lb 15.3 oz)   06/22/17 79 kg (174 lb 2.6 oz)     Temp Readings from Last 3 Encounters:   07/13/17 98.1 °F (36.7 °C) (Oral)   07/05/17 98.3 °F (36.8 °C)   07/03/17 99 °F (37.2 °C) (Oral)     BP Readings from Last 3 Encounters:   07/13/17 (!) 152/98   07/05/17 (!) 151/85   07/03/17 128/74     Pulse Readings from Last 3 Encounters:   07/13/17 89   07/05/17 86   07/03/17 93       Assessment:       1. Diffuse large B-cell lymphoma of intra-abdominal lymph nodes        Plan:       Lab Results   Component Value Date    WBC 9.58 07/13/2017    HGB 9.3 (L) 07/13/2017    HCT 29.3 (L) 07/13/2017    MCV 86 07/13/2017     (H) 07/13/2017   '    She will proceed with c3 d1. See me July 24 with a cbc. CT/PEt before c4

## 2017-07-13 NOTE — PLAN OF CARE
Problem: Chemotherapy Effects (Adult)  Intervention: Promote Energy Recovery  Discussed with pt need for rest and ways to help alleviate stress, understanding verbalized.

## 2017-07-13 NOTE — PLAN OF CARE
"Problem: Patient Care Overview  Goal: Plan of Care Review  Outcome: Ongoing (interventions implemented as appropriate)  Pt states, " I always feel tired and weak"      "

## 2017-07-24 ENCOUNTER — OFFICE VISIT (OUTPATIENT)
Dept: HEMATOLOGY/ONCOLOGY | Facility: CLINIC | Age: 77
End: 2017-07-24
Payer: MEDICARE

## 2017-07-24 ENCOUNTER — INFUSION (OUTPATIENT)
Dept: INFUSION THERAPY | Facility: HOSPITAL | Age: 77
End: 2017-07-24
Attending: INTERNAL MEDICINE
Payer: MEDICARE

## 2017-07-24 ENCOUNTER — LAB VISIT (OUTPATIENT)
Dept: LAB | Facility: HOSPITAL | Age: 77
End: 2017-07-24
Attending: INTERNAL MEDICINE
Payer: MEDICARE

## 2017-07-24 VITALS
DIASTOLIC BLOOD PRESSURE: 70 MMHG | TEMPERATURE: 99 F | SYSTOLIC BLOOD PRESSURE: 137 MMHG | HEART RATE: 69 BPM | RESPIRATION RATE: 20 BRPM

## 2017-07-24 VITALS
RESPIRATION RATE: 18 BRPM | WEIGHT: 167.56 LBS | DIASTOLIC BLOOD PRESSURE: 72 MMHG | SYSTOLIC BLOOD PRESSURE: 118 MMHG | HEIGHT: 64 IN | OXYGEN SATURATION: 97 % | HEART RATE: 96 BPM | BODY MASS INDEX: 28.6 KG/M2 | TEMPERATURE: 98 F

## 2017-07-24 DIAGNOSIS — D64.9 CHRONIC ANEMIA: ICD-10-CM

## 2017-07-24 DIAGNOSIS — T45.1X5A CHEMOTHERAPY INDUCED NEUTROPENIA: ICD-10-CM

## 2017-07-24 DIAGNOSIS — D64.9 CHRONIC ANEMIA: Primary | ICD-10-CM

## 2017-07-24 DIAGNOSIS — D70.1 CHEMOTHERAPY INDUCED NEUTROPENIA: ICD-10-CM

## 2017-07-24 DIAGNOSIS — C83.33 DIFFUSE LARGE B-CELL LYMPHOMA OF INTRA-ABDOMINAL LYMPH NODES: Primary | ICD-10-CM

## 2017-07-24 DIAGNOSIS — C83.33 DIFFUSE LARGE B-CELL LYMPHOMA OF INTRA-ABDOMINAL LYMPH NODES: ICD-10-CM

## 2017-07-24 LAB
ANISOCYTOSIS BLD QL SMEAR: ABNORMAL
BASOPHILS # BLD AUTO: ABNORMAL K/UL
BASOPHILS NFR BLD: 1 %
BLASTS NFR BLD MANUAL: 1 %
DACRYOCYTES BLD QL SMEAR: ABNORMAL
DIFFERENTIAL METHOD: ABNORMAL
EOSINOPHIL # BLD AUTO: ABNORMAL K/UL
EOSINOPHIL NFR BLD: 2 %
ERYTHROCYTE [DISTWIDTH] IN BLOOD BY AUTOMATED COUNT: 23.3 %
HCT VFR BLD AUTO: 26 %
HGB BLD-MCNC: 8.1 G/DL
HYPOCHROMIA BLD QL SMEAR: ABNORMAL
LYMPHOCYTES # BLD AUTO: ABNORMAL K/UL
LYMPHOCYTES NFR BLD: 66 %
MCH RBC QN AUTO: 26.6 PG
MCHC RBC AUTO-ENTMCNC: 31.2 G/DL
MCV RBC AUTO: 86 FL
METAMYELOCYTES NFR BLD MANUAL: 1 %
MONOCYTES # BLD AUTO: ABNORMAL K/UL
MONOCYTES NFR BLD: 10 %
MYELOCYTES NFR BLD MANUAL: 1 %
NEUTROPHILS # BLD AUTO: ABNORMAL K/UL
NEUTROPHILS NFR BLD: 17 %
NEUTS BAND NFR BLD MANUAL: 1 %
OVALOCYTES BLD QL SMEAR: ABNORMAL
PLATELET # BLD AUTO: 141 K/UL
PLATELET BLD QL SMEAR: ABNORMAL
PMV BLD AUTO: 9.5 FL
POIKILOCYTOSIS BLD QL SMEAR: SLIGHT
POLYCHROMASIA BLD QL SMEAR: ABNORMAL
RBC # BLD AUTO: 3.04 M/UL
STOMATOCYTES BLD QL SMEAR: PRESENT
TARGETS BLD QL SMEAR: ABNORMAL
WBC # BLD AUTO: 1.36 K/UL

## 2017-07-24 PROCEDURE — 99215 OFFICE O/P EST HI 40 MIN: CPT | Mod: 25,S$GLB,, | Performed by: INTERNAL MEDICINE

## 2017-07-24 PROCEDURE — 99999 PR PBB SHADOW E&M-EST. PATIENT-LVL V: CPT | Mod: PBBFAC,,, | Performed by: INTERNAL MEDICINE

## 2017-07-24 PROCEDURE — P9016 RBC LEUKOCYTES REDUCED: HCPCS

## 2017-07-24 PROCEDURE — 1125F AMNT PAIN NOTED PAIN PRSNT: CPT | Mod: S$GLB,,, | Performed by: INTERNAL MEDICINE

## 2017-07-24 PROCEDURE — 36430 TRANSFUSION BLD/BLD COMPNT: CPT

## 2017-07-24 PROCEDURE — 1157F ADVNC CARE PLAN IN RCRD: CPT | Mod: S$GLB,,, | Performed by: INTERNAL MEDICINE

## 2017-07-24 PROCEDURE — 85007 BL SMEAR W/DIFF WBC COUNT: CPT

## 2017-07-24 PROCEDURE — 96374 THER/PROPH/DIAG INJ IV PUSH: CPT

## 2017-07-24 PROCEDURE — 1159F MED LIST DOCD IN RCRD: CPT | Mod: S$GLB,,, | Performed by: INTERNAL MEDICINE

## 2017-07-24 PROCEDURE — 99499 UNLISTED E&M SERVICE: CPT | Mod: S$PBB,,, | Performed by: INTERNAL MEDICINE

## 2017-07-24 PROCEDURE — 25000003 PHARM REV CODE 250: Performed by: INTERNAL MEDICINE

## 2017-07-24 PROCEDURE — 36415 COLL VENOUS BLD VENIPUNCTURE: CPT | Mod: PO

## 2017-07-24 PROCEDURE — 63600175 PHARM REV CODE 636 W HCPCS: Performed by: INTERNAL MEDICINE

## 2017-07-24 PROCEDURE — 85027 COMPLETE CBC AUTOMATED: CPT

## 2017-07-24 PROCEDURE — 96375 TX/PRO/DX INJ NEW DRUG ADDON: CPT

## 2017-07-24 RX ORDER — HYDROCODONE BITARTRATE AND ACETAMINOPHEN 500; 5 MG/1; MG/1
TABLET ORAL ONCE
Status: DISCONTINUED | OUTPATIENT
Start: 2017-07-24 | End: 2017-07-24 | Stop reason: HOSPADM

## 2017-07-24 RX ORDER — ACETAMINOPHEN 325 MG/1
650 TABLET ORAL
Status: COMPLETED | OUTPATIENT
Start: 2017-07-24 | End: 2017-07-24

## 2017-07-24 RX ORDER — FUROSEMIDE 10 MG/ML
20 INJECTION INTRAMUSCULAR; INTRAVENOUS ONCE
Status: CANCELLED | OUTPATIENT
Start: 2017-07-24

## 2017-07-24 RX ORDER — FUROSEMIDE 10 MG/ML
20 INJECTION INTRAMUSCULAR; INTRAVENOUS ONCE
Status: COMPLETED | OUTPATIENT
Start: 2017-07-24 | End: 2017-07-24

## 2017-07-24 RX ORDER — ACETAMINOPHEN 325 MG/1
650 TABLET ORAL
Status: CANCELLED | OUTPATIENT
Start: 2017-07-24

## 2017-07-24 RX ORDER — DIPHENHYDRAMINE HYDROCHLORIDE 50 MG/ML
25 INJECTION INTRAMUSCULAR; INTRAVENOUS
Status: COMPLETED | OUTPATIENT
Start: 2017-07-24 | End: 2017-07-24

## 2017-07-24 RX ORDER — DIPHENHYDRAMINE HYDROCHLORIDE 50 MG/ML
25 INJECTION INTRAMUSCULAR; INTRAVENOUS
Status: CANCELLED | OUTPATIENT
Start: 2017-07-24

## 2017-07-24 RX ORDER — HYDROCODONE BITARTRATE AND ACETAMINOPHEN 500; 5 MG/1; MG/1
TABLET ORAL ONCE
Status: CANCELLED | OUTPATIENT
Start: 2017-07-24 | End: 2017-07-24

## 2017-07-24 RX ORDER — HEPARIN 100 UNIT/ML
500 SYRINGE INTRAVENOUS
Status: COMPLETED | OUTPATIENT
Start: 2017-07-24 | End: 2017-07-24

## 2017-07-24 RX ADMIN — DIPHENHYDRAMINE HYDROCHLORIDE 25 MG: 50 INJECTION, SOLUTION INTRAMUSCULAR; INTRAVENOUS at 11:07

## 2017-07-24 RX ADMIN — ACETAMINOPHEN 650 MG: 325 TABLET ORAL at 11:07

## 2017-07-24 RX ADMIN — HEPARIN 500 UNITS: 100 SYRINGE at 02:07

## 2017-07-24 RX ADMIN — FUROSEMIDE 20 MG: 10 INJECTION, SOLUTION INTRAMUSCULAR; INTRAVENOUS at 02:07

## 2017-07-24 NOTE — PLAN OF CARE
Problem: Patient Care Overview  Goal: Plan of Care Review  Outcome: Ongoing (interventions implemented as appropriate)  Patient states she is doing Ok, just tired and lots of stress in her life right now.  Support offered, plan of care reviewed.

## 2017-07-24 NOTE — PROGRESS NOTES
Subjective:       Patient ID: Violet Swenson is a 77 y.o. female.    Chief Complaint: Lymphoma    HPI This is a 77-year-old  lady who comes for follow up of hr large cell lymphoma.  She  was   admitted to the hospital in early April with diffuse abdominal pain. A CT of   the abdomen done on 2017 was reported as showing a 6.9 x 7.0 x 8.4 cm soft  tissue mass in the right lower quadrant in the terminal ileum abutting the   cecum. There was adjacent conglomerate adenopathy in the right lower quadrant   mesentery.     The patient had a colonoscopy that showed a lesion in the terminal ileum.     She underwent a right hemicolectomy and terminal ileum removal. 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  She had a Ct/PET  2017 that showedevidence of surgery and some non specific findings, but no evidence of activer disease elsewhere.  An ECHO showed normal cardiac ejection fraction, as well as a MUGA.  She saw cardiology and cleared for ADRIAMYCIN administration.  She is negative for Hep B/C and HIV  Bone marrow was negative.     The patient comes on c3 d10   of R-CHOP.  She says she feels fatigued and has no energy,l  She has requiring periodic trransfusions below of hemoglobins in the range of 8.0 gr.% and associated symptoms   ALLERGIES: Oxycodone. The chart says that she has allergic to steroids, but   says she is not.     MEDICATIONS: See MedCard.     PREVIOUS SURGERIES: Appendectomy in , tonsillectomy at age 19, gastric   bypass with incidental cholecystectomy, bilateral knee replacement in .     SOCIAL HISTORY: She is . She had two natural children, and one adopted   one. The adopted child has . She lives in [place] with her   . She smoked for two years, averaging a pack a day. She stopped 35   years ago or so. Denies any alcohol intake. She worked in [employer].     FAMILY HISTORY: Father  of colon cancer.  Younger brother had leukemia that  transform into a lymphoma. Sister had pancreatic cancer.  Review of Systems   Constitutional: Positive for fatigue.   Musculoskeletal:        Pain in joints       Objective:      Physical Exam   Constitutional: She is oriented to person, place, and time. She appears well-developed. No distress.   HENT:   Head: Normocephalic.   Right Ear: Tympanic membrane, external ear and ear canal normal.   Left Ear: Tympanic membrane, external ear and ear canal normal.   Nose: Nose normal. Right sinus exhibits no maxillary sinus tenderness and no frontal sinus tenderness. Left sinus exhibits no maxillary sinus tenderness and no frontal sinus tenderness.   Mouth/Throat: Oropharynx is clear and moist and mucous membranes are normal.   Teeth normal.  Gums normal.   Eyes: Conjunctivae and lids are normal. Pupils are equal, round, and reactive to light.   Neck: Normal carotid pulses, no hepatojugular reflux and no JVD present. Carotid bruit is not present. No tracheal deviation present. No thyroid mass and no thyromegaly present.   Cardiovascular: Normal rate, regular rhythm, S1 normal, S2 normal, normal heart sounds and intact distal pulses.  Exam reveals no gallop and no friction rub.    No murmur heard.  Carotid exam normal   Pulmonary/Chest: Effort normal and breath sounds normal. No accessory muscle usage. No respiratory distress. She has no wheezes. She has no rales. She exhibits no tenderness.   Abdominal: Soft. Normal appearance. She exhibits no distension and no mass. There is no splenomegaly or hepatomegaly. There is no tenderness. There is no rebound and no guarding.   Musculoskeletal: Normal range of motion. She exhibits no edema or tenderness.        Right hand: Normal.        Left hand: Normal.       Lymphadenopathy:     She has no cervical adenopathy.     She has no axillary adenopathy.        Right: No inguinal and no supraclavicular adenopathy present.        Left: No inguinal and no  supraclavicular adenopathy present.   Neurological: She is alert and oriented to person, place, and time. She has normal strength. No cranial nerve deficit. Coordination normal.   Skin: Skin is warm and dry. No rash noted. She is not diaphoretic. No cyanosis or erythema. No pallor. Nails show no clubbing.   Psychiatric: She has a normal mood and affect. Her behavior is normal. Judgment and thought content normal.       Wt Readings from Last 3 Encounters:   07/24/17 76 kg (167 lb 8.8 oz)   07/13/17 77.5 kg (170 lb 13.7 oz)   07/13/17 77.1 kg (170 lb)     Temp Readings from Last 3 Encounters:   07/24/17 98 °F (36.7 °C)   07/13/17 98.1 °F (36.7 °C) (Oral)   07/05/17 98.3 °F (36.8 °C)     BP Readings from Last 3 Encounters:   07/24/17 118/72   07/13/17 (!) 152/98   07/13/17 127/74     Pulse Readings from Last 3 Encounters:   07/24/17 96   07/13/17 89   07/13/17 80     \   Assessment:       1. Diffuse large B-cell lymphoma of intra-abdominal lymph nodes      2-Chemo induced neutropenia  3-Anemia associated with neoplastic disease  Plan:       Lab Results   Component Value Date    WBC 1.36 (LL) 07/24/2017    HGB 8.1 (L) 07/24/2017    HCT 26.0 (L) 07/24/2017    MCV 86 07/24/2017     (L) 07/24/2017     Lab Results   Component Value Date    IRON 31 04/26/2017    TIBC 354 04/26/2017    FERRITIN 111 04/26/2017       Neutropenia is due to chemo. She had Neulasta on day. Reminded about  what to do if she develops a fever.  Anemia is very symptomatic. Will order a unit of packed cells today     See me  Aug 3 with a cbc and a cmp.  CT/PET before the visit

## 2017-07-24 NOTE — PATIENT INSTRUCTIONS
When You Need a Blood Transfusion (Adult)  A blood transfusion is often done when a person has lost blood because of an injury or during surgery. It can also be done because of diseases or conditions that affect the blood. Blood is made up of several different parts (blood products). You may receive some or all of these blood products during a transfusion. Blood for transfusion is usually donated from another person (donor). Strict measures are taken to make sure that donated blood is safe before it's given to you. This sheet helps you understand how a blood transfusion is done. Your health care provider will discuss your condition with you and answer your questions.   The parts of blood  Blood can be broken down into different parts that perform special roles in the body. These parts include:  · Red blood cells, which carry oxygen throughout the body.  · Platelets, which help stop bleeding.  · Plasma (the liquid part of blood), which carries red blood cells and platelets throughout the body. Plasma also helps platelets in stopping bleeding.  Where does donated blood come from?  · Volunteer donors. These are people who donate their blood to help others in need of blood. Blood donation can take place at several places, including a hospital, blood bank, or during a blood drive.  · Directed donation. A person may need a blood transfusion during a planned surgery. Family and friends can have their blood tested for compatibility and donate blood for the patient before the surgery. This needs to be done at least 7 day(s) in advance. This is because the blood must be tested for safety.  · Autologous donation. This is also called self-donation. For planned surgery, a person can donate his or her own blood starting up to 6 weeks before surgery. Doctors often recommend this kind of donation because it is the safest, since the patient's own blood is used for transfusion.  Are blood transfusions safe?  Except for self-donated  blood, all donated blood is tested and processed to make sure that the blood is safe:  · The health and medical history of each donor is carefully screened. If a person is considered high-risk for infection or problems, he or she isn't accepted as a blood donor.  · Donated blood is tested for infections such as hepatitis, syphilis, and HIV (the virus that causes AIDS). If the tested blood is found to be unsafe, it's destroyed.  · Blood is divided into four types: A, B, AB, and O. Blood also has Rh types: positive (+) and negative (-). You can only receive blood products that are compatible with (match) your blood type. A sample of your blood is tested for compatibility with donated blood. This is done before blood products are prepared for a transfusion.  How is a blood transfusion done?  A blood transfusion takes place in a blood center, hospital room, or operating room. It usually lasts 1-2 hours. Your health care provider will discuss the blood transfusion with you before it's done. You'll need to give permission for the blood transfusion by signing a consent form.  · Two health care providers confirm your identity. They also confirm that they have the correct blood product(s) for you.  · An intravenous (IV) line is placed in a vein if you do not already have an IV.  · The blood product comes in a plastic bag that is hung on an IV pole. The blood product flows from the bag into your IV line. The IV line may be connected to a pump, which controls the transfusion rate. You may receive more than one kind of blood product through the IV.  · Your vital signs (blood pressure, heart rate, respiratory rate, and temperature) are checked throughout the transfusion. This is to make sure you are not having a reaction to the blood product.  · The IV line may be removed once the transfusion is complete.  Possible risks and complications of blood transfusions  Most transfusions are problem free. In some cases, reactions occur.  These can happen within seconds to minutes during the transfusion or a week to a few months after the transfusion. Call your doctor or nurse right away if you have any of the signs or symptoms in the table below during or after a transfusion:  Reaction Timing Signs and Symptoms   Allergic reaction (mild) · Within seconds to minutes during the transfusion  · Up to 24 hours after the transfusion Hives or red welts on the skin, mild itching, rash, localized swelling, flushing (red face), wheezing, shortness of breath, or stridor (high-pitched noise or sound)   Anaphylactic reaction · Within seconds to minutes during the transfusion  · Up to 24 hours after the transfusion Shortness of breath, flushing (red face), wheezing, labored (working hard) breathing, low blood pressure, localized swelling, chest tightness, or cramps   Febrile nonhemolytic reaction · Within minutes to hours during the transfusion  · Up to 24 hours after the transfusion Fever (increase of 1° C or higher), chills, flushing (red face), nausea, headache, minor discomfort, or mild shortness of breath   Acute immune hemolytic reaction · Within minutes during the transfusion  · Up to 24 hours after the transfusion Fever, red or brown urine, back pain, fast heart rate (tachycardia), abdominal pain, low blood pressure, feeling anxious, chills, chest pain, nausea, or fainting spells   Transfusion-related acute lung injury (TRALI) · Within 1 to 2 hours during the transfusion  · Up to 6 hours after the transfusion Shortness of breath, trouble breathing, low blood pressure, fever, pulmonary edema   Transfusion-associated circulatory overload · Near the end of the transfusion  · Within 6 hours after the transfusion Shortness of breath, fast heart rate (tachycardia), problems breathing when lying on back, abnormal blood pressure   Post-transfusion purpura (PUP) · Within 1 week  · Up to 48 days after the transfusion Purple spots on skin; nose bleed; bleeding from  "the urinary tract, abdomen, colon, or rectum; fever; or chills   "Delayed" transfusion-related acute lung injury (TRALI) · Within 72 hours (3 days) after the transfusion "Sudden" onset of respiratory distress or trouble breathing   "Delayed" hemolytic reaction · Within 3 to 7 days  · Up to weeks after the transfusion Low-grade fever, mild jaundice (yellowing of the skin and whites of the eyes), decrease in hematocrit, chills, chest pain, back pain, nausea   Date Last Reviewed: 4/25/2015  © 6610-8221 [email or web address]. [address] [place] [address]. All rights reserved. This information is not intended as a substitute for professional medical care. Always follow your healthcare professional's instructions.        "

## 2017-07-28 ENCOUNTER — TELEPHONE (OUTPATIENT)
Dept: RADIOLOGY | Facility: HOSPITAL | Age: 77
End: 2017-07-28

## 2017-07-31 ENCOUNTER — TELEPHONE (OUTPATIENT)
Dept: HEMATOLOGY/ONCOLOGY | Facility: CLINIC | Age: 77
End: 2017-07-31

## 2017-07-31 ENCOUNTER — DOCUMENTATION ONLY (OUTPATIENT)
Dept: HEMATOLOGY/ONCOLOGY | Facility: CLINIC | Age: 77
End: 2017-07-31

## 2017-07-31 ENCOUNTER — HOSPITAL ENCOUNTER (OUTPATIENT)
Dept: RADIOLOGY | Facility: HOSPITAL | Age: 77
Discharge: HOME | End: 2017-07-31
Attending: INTERNAL MEDICINE
Payer: MEDICARE

## 2017-07-31 DIAGNOSIS — C83.33 DIFFUSE LARGE B-CELL LYMPHOMA OF INTRA-ABDOMINAL LYMPH NODES: ICD-10-CM

## 2017-07-31 DIAGNOSIS — R91.8 PULMONARY INFILTRATES: Primary | ICD-10-CM

## 2017-07-31 PROCEDURE — 78815 PET IMAGE W/CT SKULL-THIGH: CPT | Mod: 26,PS,, | Performed by: RADIOLOGY

## 2017-07-31 PROCEDURE — A9552 F18 FDG: HCPCS | Mod: PO

## 2017-07-31 NOTE — PROGRESS NOTES
Johnnie had a Ct/PEt today. There is development of new ground glass infiltrates on both lungs, new sicne the Ct/PEt she ahd a few months back.  I reviewed the films with radiology and PUlmonary.  I called the patient. She denies fevers. Says she is SOB but this is an onglnig chronic problem.  Differential diagnosis includes edema, infection , tumor.  She has no fevers and lymphoma usually does not act this way.  The chemotherapy she ahs ahd usually does not cause pulmonary toxicity,.  Dr Corral feels this may be related to fluid   Isma asked to see Dr Corral with a CMp and a PFt.  I will see her on Thursday as suggested

## 2017-08-01 ENCOUNTER — TELEPHONE (OUTPATIENT)
Dept: PULMONOLOGY | Facility: CLINIC | Age: 77
End: 2017-08-01

## 2017-08-01 DIAGNOSIS — R00.1 BRADYCARDIA: Primary | ICD-10-CM

## 2017-08-02 ENCOUNTER — OFFICE VISIT (OUTPATIENT)
Dept: PULMONOLOGY | Facility: CLINIC | Age: 77
End: 2017-08-02
Payer: MEDICARE

## 2017-08-02 ENCOUNTER — PROCEDURE VISIT (OUTPATIENT)
Dept: PULMONOLOGY | Facility: CLINIC | Age: 77
End: 2017-08-02
Payer: MEDICARE

## 2017-08-02 ENCOUNTER — LAB VISIT (OUTPATIENT)
Dept: LAB | Facility: HOSPITAL | Age: 77
End: 2017-08-02
Attending: INTERNAL MEDICINE
Payer: MEDICARE

## 2017-08-02 VITALS
BODY MASS INDEX: 29.02 KG/M2 | HEIGHT: 64 IN | HEART RATE: 78 BPM | SYSTOLIC BLOOD PRESSURE: 100 MMHG | RESPIRATION RATE: 18 BRPM | DIASTOLIC BLOOD PRESSURE: 62 MMHG | OXYGEN SATURATION: 96 % | WEIGHT: 170 LBS

## 2017-08-02 DIAGNOSIS — R91.8 PULMONARY INFILTRATES: ICD-10-CM

## 2017-08-02 DIAGNOSIS — R94.2 DIFFUSION CAPACITY OF LUNG (DL), DECREASED: Chronic | ICD-10-CM

## 2017-08-02 DIAGNOSIS — R91.8 GROUND GLASS OPACITY PRESENT ON IMAGING OF LUNG: Primary | ICD-10-CM

## 2017-08-02 DIAGNOSIS — R00.1 BRADYCARDIA: ICD-10-CM

## 2017-08-02 DIAGNOSIS — C83.33 DIFFUSE LARGE B-CELL LYMPHOMA OF INTRA-ABDOMINAL LYMPH NODES: ICD-10-CM

## 2017-08-02 DIAGNOSIS — I25.5 ISCHEMIC CARDIOMYOPATHY: ICD-10-CM

## 2017-08-02 LAB
ALBUMIN SERPL BCP-MCNC: 2.6 G/DL
ALP SERPL-CCNC: 93 U/L
ALT SERPL W/O P-5'-P-CCNC: 19 U/L
ANION GAP SERPL CALC-SCNC: 6 MMOL/L
ANISOCYTOSIS BLD QL SMEAR: ABNORMAL
AST SERPL-CCNC: 20 U/L
BASOPHILS # BLD AUTO: 0.08 K/UL
BASOPHILS NFR BLD: 1.2 %
BILIRUB SERPL-MCNC: 0.3 MG/DL
BUN SERPL-MCNC: 22 MG/DL
CALCIUM SERPL-MCNC: 8.9 MG/DL
CHLORIDE SERPL-SCNC: 117 MMOL/L
CO2 SERPL-SCNC: 19 MMOL/L
CREAT SERPL-MCNC: 1 MG/DL
DACRYOCYTES BLD QL SMEAR: ABNORMAL
DIFFERENTIAL METHOD: ABNORMAL
EOSINOPHIL # BLD AUTO: 0.1 K/UL
EOSINOPHIL NFR BLD: 0.9 %
ERYTHROCYTE [DISTWIDTH] IN BLOOD BY AUTOMATED COUNT: 23.4 %
EST. GFR  (AFRICAN AMERICAN): >60 ML/MIN/1.73 M^2
EST. GFR  (NON AFRICAN AMERICAN): 55 ML/MIN/1.73 M^2
GIANT PLATELETS BLD QL SMEAR: PRESENT
GLUCOSE SERPL-MCNC: 82 MG/DL
HCT VFR BLD AUTO: 31.4 %
HGB BLD-MCNC: 9.9 G/DL
HYPOCHROMIA BLD QL SMEAR: ABNORMAL
LYMPHOCYTES # BLD AUTO: 1.9 K/UL
LYMPHOCYTES NFR BLD: 29.6 %
MCH RBC QN AUTO: 27.4 PG
MCHC RBC AUTO-ENTMCNC: 31.5 G/DL
MCV RBC AUTO: 87 FL
MONOCYTES # BLD AUTO: 0.9 K/UL
MONOCYTES NFR BLD: 14.6 %
NEUTROPHILS # BLD AUTO: 3.4 K/UL
NEUTROPHILS NFR BLD: 53.7 %
OVALOCYTES BLD QL SMEAR: ABNORMAL
PLATELET # BLD AUTO: 473 K/UL
PLATELET BLD QL SMEAR: ABNORMAL
PMV BLD AUTO: 9.4 FL
POIKILOCYTOSIS BLD QL SMEAR: ABNORMAL
POLYCHROMASIA BLD QL SMEAR: ABNORMAL
POST FEF 25 75: 2.88 L/S (ref 1–2.24)
POST FET 100: 7.41 S
POST FEV1 FVC: 83 %
POST FEV1: 2.08 L (ref 1.8–2.39)
POST FIF 50: 3.63 L/S
POST FVC: 2.51 L (ref 2.45–3.14)
POST PEF: 5.61 L/S (ref 4.33–6.05)
POTASSIUM SERPL-SCNC: 4.7 MMOL/L
PRE DLCO: 13.28 ML/MMHG/MIN (ref 15.81–24.1)
PRE ERV: 0.52 L
PRE FEF 25 75: 2.83 L/S (ref 1–2.24)
PRE FET 100: 4.57 S
PRE FEV1 FVC: 83 %
PRE FEV1: 2.04 L (ref 1.8–2.39)
PRE FIF 50: 0.78 L/S
PRE FRC PL: 2.9 L (ref 2.12–3.07)
PRE FVC: 2.47 L (ref 2.45–3.14)
PRE KROGHS K: 3.1 1/MIN
PRE PEF: 5.98 L/S (ref 4.33–6.05)
PRE RV: 2.38 L (ref 1.63–2.34)
PRE SVC: 2.47 L
PRE TLC: 4.85 L (ref 4.38–5.15)
PREDICTED DLCO: 19.96 ML/MMHG/MIN (ref 15.81–24.1)
PREDICTED FEV1 FVC: 74.66 % (ref 69.76–79.56)
PREDICTED FEV1: 2.09 L (ref 1.8–2.39)
PREDICTED FRC N2: 2.59 L (ref 2.12–3.07)
PREDICTED FRC PL: 2.59 L (ref 2.12–3.07)
PREDICTED FVC: 2.79 L (ref 2.45–3.14)
PREDICTED RV: 1.99 L (ref 1.63–2.34)
PREDICTED SVC: 2.66 L
PREDICTED TLC: 4.77 L (ref 4.38–5.15)
PROT SERPL-MCNC: 5.2 G/DL
PROVOCATION PROTOCOL: ABNORMAL
RBC # BLD AUTO: 3.61 M/UL
SCHISTOCYTES BLD QL SMEAR: ABNORMAL
SODIUM SERPL-SCNC: 142 MMOL/L
TARGETS BLD QL SMEAR: ABNORMAL
TROPONIN I SERPL DL<=0.01 NG/ML-MCNC: 0.03 NG/ML
WBC # BLD AUTO: 6.42 K/UL

## 2017-08-02 PROCEDURE — 99999 PR PBB SHADOW E&M-EST. PATIENT-LVL III: CPT | Mod: PBBFAC,,, | Performed by: INTERNAL MEDICINE

## 2017-08-02 PROCEDURE — 94060 EVALUATION OF WHEEZING: CPT | Mod: S$GLB,,, | Performed by: INTERNAL MEDICINE

## 2017-08-02 PROCEDURE — 94729 DIFFUSING CAPACITY: CPT | Mod: S$GLB,,, | Performed by: INTERNAL MEDICINE

## 2017-08-02 PROCEDURE — 99499 UNLISTED E&M SERVICE: CPT | Mod: S$GLB,,, | Performed by: INTERNAL MEDICINE

## 2017-08-02 PROCEDURE — 94726 PLETHYSMOGRAPHY LUNG VOLUMES: CPT | Mod: S$GLB,,, | Performed by: INTERNAL MEDICINE

## 2017-08-02 PROCEDURE — 1159F MED LIST DOCD IN RCRD: CPT | Mod: ,,, | Performed by: INTERNAL MEDICINE

## 2017-08-02 PROCEDURE — 99205 OFFICE O/P NEW HI 60 MIN: CPT | Mod: 25,S$GLB,, | Performed by: INTERNAL MEDICINE

## 2017-08-02 PROCEDURE — 1157F ADVNC CARE PLAN IN RCRD: CPT | Mod: ,,, | Performed by: INTERNAL MEDICINE

## 2017-08-02 RX ORDER — FUROSEMIDE 20 MG/1
20 TABLET ORAL DAILY
Qty: 30 TABLET | Refills: 0 | Status: SHIPPED | OUTPATIENT
Start: 2017-08-02 | End: 2017-09-01 | Stop reason: SDUPTHER

## 2017-08-02 NOTE — ASSESSMENT & PLAN NOTE
Seen on PET CT images  No prior dedicated Chest CT  May be alveolar fluid  BNP  Lasix 20 mg daily for 2 weeks  Chest CT in 2 weeks

## 2017-08-02 NOTE — PROGRESS NOTES
History & Physical    SUBJECTIVE:     History of Present Illness:  Patient is a 77 y.o. female presents with abnormal radiological imaging  Accompanied by her spouse  Patient seen by Dr. 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    Chemotherapy regimen is R CHOP  Also seen by cardiology in the past she has a pacemaker  I have reviewed her imaging  Patient denies any overt respiratory symptoms no cough no wheezing no shortness of breath no fever no night sweats no weight loss no TB exposure.  Patient denies knowledge of any prior abnormal imaging  Nonsmoker retired   Immunizations up-to-date  No prior PFTs    I reviewed her PFTs performed today the spirometry is normal.    Her TLC is normal.    Her DLCO is only mildly reduced but corrects for alveolar volume 67% predicted  I reviewed her PET/CT images.  PET/CT usually is not the appropriate study for examining lung parenchyma ; explained this to the patient  She will need a dedicated chest CT scan  I will get a BNP today    I'll put her on a dose of Lasix once a day for 14 days then she'll get her chest CT scan  Ms. groundglass opacities look more like alveolar fluid or edema  We will repeat the CT scan once he has been diuresed maximally  If this infiltrates persist then consideration for trial of steroids would be in play with or without Bronchoscopy.            Chief Complaint   Patient presents with    abn PET       Review of patient's allergies indicates:   Allergen Reactions    Corticosteroids (glucocorticoids) Nausea Only and Other (See Comments)     Stomach pain, dizziness, headache    Oxycodone Other (See Comments)     Blood pressure dropped       Current Outpatient Prescriptions   Medication Sig Dispense Refill    albuterol 90 mcg/actuation inhaler Inhale 1-2 puffs into the lungs every 6 (six) hours as needed for Wheezing. Rescue 18 g 0    allopurinol (ZYLOPRIM) 100 MG tablet TAKE 2 TABLETS BY MOUTH DAILY 180 tablet 0    alprazolam  (XANAX) 0.25 MG tablet Take 1 tablet (0.25 mg total) by mouth 2 (two) times daily as needed for Anxiety. 60 tablet 5    ascorbic acid, vitamin C, (VITAMIN C) 100 MG tablet Take by mouth once daily.      aspirin (ECOTRIN) 81 MG EC tablet Take 81 mg by mouth nightly.       clopidogrel (PLAVIX) 75 mg tablet Take 1 tablet (75 mg total) by mouth once daily. (Patient taking differently: Take 75 mg by mouth nightly. ) 90 tablet 3    COLCRYS 0.6 mg tablet TAKE 1 TABLET(0.6 MG) BY MOUTH EVERY DAY 30 tablet 3    ergocalciferol, vitamin D2, 400 unit Tab Take 1 tablet by mouth once daily.       fluticasone (FLONASE) 50 mcg/actuation nasal spray 2 sprays by Each Nare route 2 (two) times daily as needed for Allergies.       gabapentin (NEURONTIN) 100 MG capsule TAKE 1 CAPSULE(100 MG) BY MOUTH THREE TIMES DAILY 90 capsule 3    hydrochlorothiazide (HYDRODIURIL) 12.5 MG Tab TAKE 1 TABLET BY MOUTH ONCE DAILY 90 tablet 1    inhalation spacing device (RITEFLO AEROCHAMBER) Use as directed for inhalation. 1 Device 0    isosorbide mononitrate (IMDUR) 30 MG 24 hr tablet Take 1 tablet (30 mg total) by mouth once daily. (Patient taking differently: Take 30 mg by mouth nightly. ) 90 tablet 3    levothyroxine (SYNTHROID) 75 MCG tablet TAKE 1 TABLET(75 MCG) BY MOUTH BEFORE BREAKFAST 90 tablet 1    meloxicam (MOBIC) 7.5 MG tablet TAKE 1 TABLET BY MOUTH EVERY DAY AS NEEDED FOR PAIN (Patient taking differently: TAKE 1 TABLET BY MOUTH DAILY FOR PAIN) 90 tablet 0    mirtazapine (REMERON SOL-TAB) 15 MG disintegrating tablet Take 1 tablet (15 mg total) by mouth nightly. 30 tablet 2    multivitamin (ONE DAILY MULTIVITAMIN) per tablet Take 1 tablet by mouth once daily.      nitroglycerin 400 mcg/spray SprA Place 1 spray under the tongue every 5 (five) minutes as needed for Chest pain.       ondansetron (ZOFRAN-ODT) 8 MG TbDL Take 1 tablet (8 mg total) by mouth every 12 (twelve) hours as needed (Nausea). 30 tablet 2    predniSONE  "(DELTASONE) 50 MG Tab One tablet po bid (Patient taking differently: Take 50 mg by mouth 2 (two) times daily. One tablet po bid for 5 days starting on day 1 of each [facility] chemotherapy cycle) 10 tablet 6    rosuvastatin (CRESTOR) 10 MG tablet Take 1 tablet (10 mg total) by mouth every evening. 30 tablet 5    sertraline (ZOLOFT) 100 MG tablet Take 1 tablet (100 mg total) by mouth once daily. 90 tablet 3    verapamil (CALAN-SR) 120 MG CR tablet TAKE 1/2 TABLET BY MOUTH NIGHTLY 30 tablet 6    ZINC ACETATE ORAL Take 250 mg by mouth once daily.       furosemide (LASIX) 20 MG tablet Take 1 tablet (20 mg total) by mouth once daily. 30 tablet 0     No current facility-administered medications for this visit.        Past Medical History:   Diagnosis Date    Arthritis     Cancer     lymphoma    Coronary artery disease     01/2015 [facility] patent LCX. 50% stenosis in LAD and RCA.      Depression     Encounter for blood transfusion     GERD (gastroesophageal reflux disease)     Gout, arthritis     Hx of psychiatric care     Hyperlipidemia     Hypertension     Hypothyroidism     Lung nodule 2014    RML--stable    Pacemaker     Metronic    Pneumonia     Psychiatric problem      Past Surgical History:   Procedure Laterality Date    APPENDECTOMY      CARDIAC PACEMAKER PLACEMENT  01/22/2015    CHOLECYSTECTOMY      COLONOSCOPY N/A 4/6/2017    Procedure: COLONOSCOPY;  Surgeon: Tye Enamorado MD;  [facility];  Service: Endoscopy;  Laterality: N/A;    CORONARY ANGIOPLASTY  02/2014    CORONARY STENT PLACEMENT  02/05/2014    GASTRIC BYPASS      HEMICOLECTOMY Right     HERNIA REPAIR      TONSILLECTOMY      TOTAL KNEE ARTHROPLASTY Bilateral      Family History   Problem Relation Age of Onset    Heart disease Mother     Hypertension Mother     Prostate cancer Mother     Stomach cancer Father      "ulcers that turned to cancer"    Pancreatic cancer Sister     Leukemia Brother      "leukemia which led to " "intestinal cancer"    Drug abuse Son     COPD Daughter      Social History   Substance Use Topics    Smoking status: Former Smoker    Smokeless tobacco: Never Used    Alcohol use No        Review of Systems:  Review of Systems   Constitutional: Negative for fatigue.   HENT: Negative.    Eyes: Negative.    Respiratory: Negative.  Negative for cough, shortness of breath, wheezing and stridor.    Cardiovascular: Negative.    Gastrointestinal: Negative.    Endocrine: Negative.    Genitourinary: Negative.    Musculoskeletal: Negative.    Skin: Negative.    Allergic/Immunologic: Negative.    Neurological: Negative.    Hematological: Negative.    Psychiatric/Behavioral: Negative.        OBJECTIVE:     Vital Signs (Most Recent)  Pulse: 78 (08/02/17 0951)  Resp: 18 (08/02/17 0951)  BP: 100/62 (08/02/17 0951)  SpO2: 96 % (08/02/17 0951)  5' 4" (1.626 m)  77.1 kg (170 lb)     Physical Exam:  Physical Exam   Constitutional: She is oriented to person, place, and time. She appears well-developed and well-nourished.   HENT:   Head: Normocephalic.   Nose: Nose normal.   Eyes: Conjunctivae and EOM are normal. Pupils are equal, round, and reactive to light.   Neck: Normal range of motion. Neck supple. No JVD present. No tracheal deviation present.   Cardiovascular: Normal rate, regular rhythm, normal heart sounds and intact distal pulses.    No murmur heard.  Pulmonary/Chest: Effort normal and breath sounds normal. No respiratory distress. She has no wheezes. She has no rales.   Abdominal: Soft. Bowel sounds are normal.   Musculoskeletal: Normal range of motion. She exhibits no edema.   Neurological: She is alert and oriented to person, place, and time. She has normal reflexes.   Skin: Skin is warm and dry.   Nursing note and vitals reviewed.      Laboratory  CBC: Reviewed  BMP: Reviewed     FEV1 2.04( 97%), FVC 2.47( 88%), FEV1/FVC 82  TLC 4.85( 102%)  DLCO 13.3( 67%)    Diagnostic Results:  CT: Reviewed    Echo: Reviewed  Ref " Range & Units 3mo ago  (5/3/17) 3mo ago  (4/5/17)      EF 55 - 65 55 40     Mitral Valve Regurgitation   TRIVIAL TO MILD MILD    Est. PA Systolic Pressure  23.79 29.21    Tricuspid Valve Regurgitation  TRIVIAL          PET Scan: Reviewed  Chest: There are multiple bilateral patchy groundglass pulmonary infiltrates with associated hypermetabolism, increased compared to prior.    No discrete lung nodules or masses are identified given limitation of non-breath-hold technique.    Incidental calcified granuloma present in the right middle lobe.  No pleural effusion.    Small calcified mediastinal lymph nodes identified.  No hypermetabolic lymphadenopathy.    Right sided Mediport catheter noted in place.   ASSESSMENT/PLAN:     Problem List Items Addressed This Visit     Diffusion capacity of lung (dl), decreased (Chronic)    Relevant Medications    furosemide (LASIX) 20 MG tablet    Other Relevant Orders    CT Chest With Contrast    Ischemic cardiomyopathy    Relevant Medications    furosemide (LASIX) 20 MG tablet    Other Relevant Orders    B-TYPE NATRIURETIC PEPTIDE    Diffuse large B cell lymphoma    Ground glass opacity present on imaging of lung - Primary     Seen on PET CT images  No prior dedicated Chest CT  May be alveolar fluid  BNP  Lasix 20 mg daily for 2 weeks  Chest CT in 2 weeks           Other Visit Diagnoses    None.         PLAN:Plan     Return in about 2 weeks (around 8/16/2017) for BNP today, Chest CT in 14 days, , Sign up my [facility].    This note was prepared using voice recognition system and is likely to have sound alike errors that may have been overlooked even after proof reading.  Please call me with any questions    Discussed diagnosis, its evaluation, treatment and usual course. All questions answered.    Thank you for the courtesy of participating in the care of this patient: Dr Da Corral MD

## 2017-08-03 ENCOUNTER — OFFICE VISIT (OUTPATIENT)
Dept: HEMATOLOGY/ONCOLOGY | Facility: CLINIC | Age: 77
End: 2017-08-03
Payer: MEDICARE

## 2017-08-03 VITALS
DIASTOLIC BLOOD PRESSURE: 88 MMHG | OXYGEN SATURATION: 95 % | TEMPERATURE: 98 F | RESPIRATION RATE: 18 BRPM | WEIGHT: 172.81 LBS | BODY MASS INDEX: 29.5 KG/M2 | SYSTOLIC BLOOD PRESSURE: 130 MMHG | HEIGHT: 64 IN | HEART RATE: 87 BPM

## 2017-08-03 DIAGNOSIS — R91.8 GROUND GLASS OPACITY PRESENT ON IMAGING OF LUNG: ICD-10-CM

## 2017-08-03 DIAGNOSIS — I25.5 ISCHEMIC CARDIOMYOPATHY: Primary | ICD-10-CM

## 2017-08-03 DIAGNOSIS — R79.89 ELEVATED BRAIN NATRIURETIC PEPTIDE (BNP) LEVEL: ICD-10-CM

## 2017-08-03 DIAGNOSIS — R06.02 SHORTNESS OF BREATH: Primary | ICD-10-CM

## 2017-08-03 PROCEDURE — 99499 UNLISTED E&M SERVICE: CPT | Mod: S$GLB,,, | Performed by: INTERNAL MEDICINE

## 2017-08-03 PROCEDURE — 99215 OFFICE O/P EST HI 40 MIN: CPT | Mod: S$GLB,,, | Performed by: INTERNAL MEDICINE

## 2017-08-03 PROCEDURE — 1159F MED LIST DOCD IN RCRD: CPT | Mod: S$GLB,,, | Performed by: INTERNAL MEDICINE

## 2017-08-03 PROCEDURE — 1157F ADVNC CARE PLAN IN RCRD: CPT | Mod: S$GLB,,, | Performed by: INTERNAL MEDICINE

## 2017-08-03 PROCEDURE — 1126F AMNT PAIN NOTED NONE PRSNT: CPT | Mod: S$GLB,,, | Performed by: INTERNAL MEDICINE

## 2017-08-03 PROCEDURE — 99999 PR PBB SHADOW E&M-EST. PATIENT-LVL V: CPT | Mod: PBBFAC,,, | Performed by: INTERNAL MEDICINE

## 2017-08-03 NOTE — PROGRESS NOTES
Subjective:       Patient ID: Violet Swenson is a 77 y.o. female.    Chief Complaint: Follow-up    HPI This is a 77-year-old  lady who comes for follow up of hr large cell lymphoma.  She  was   admitted to the hospital in early April with diffuse abdominal pain. A CT of   the abdomen done on 04/05/2017 was reported as showing a 6.9 x 7.0 x 8.4 cm soft  tissue mass in the right lower quadrant in the terminal ileum abutting the   cecum. There was adjacent conglomerate adenopathy in the right lower quadrant   mesentery.     The patient had a colonoscopy that showed a lesion in the terminal ileum.     She underwent a right hemicolectomy and terminal ileum removal. 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  She had a Ct/PET that shows evidence of surgery and some non specific findings, but no evidence of activer disease elsewhere.  An ECHO showed normal cardiac ejection fraction, as wella s a MUGA.  She was seen cardiology and cleared for ADRIAMYCIN administration.  She is negative for Hep B/C and HIV  Bone marrow was negative. She is already on allopuirinol     The patient started  R-CHOP. Treatmnt    She tolerated the treatment with some minor difficulties. After 3 cycles, she had a repeat CT/PET  There was no activity in the abdomen.  There was development of ground glass opacities/infiltrates in both lungs which were hypermetabolic although the SUV was not reported.  We discussed the imaging studies with Radiology and Pulmonary.  Dr Corral of the Pulmonary Department favors the findings to be due to pulmonary congestion.  Her troponin is normal, but her BNP is markedly elevated at 1,103.  She was started on Lasix by dr Corral and asked to have a  Ct in few weeks  The patient is chronically SOB but she says symptoms are not worse than before  She has noticed edema of the legs for the alst several weeks.   ALLERGIES: Oxycodone. The chart says that she has  allergic to steroids, but   says she is not.     MEDICATIONS: See MedCard.     PREVIOUS SURGERIES: Appendectomy in , tonsillectomy at age 19, gastric   bypass with incidental cholecystectomy, bilateral knee replacement in .     SOCIAL HISTORY: She is . She had two natural children, and one adopted   one. The adopted child has . She lives in [place] with her   . She smoked for two years, averaging a pack a day. She stopped 35   years ago or so. Denies any alcohol intake. She worked in [email or web address].     FAMILY HISTORY: Father  of colon cancer. Younger brother had leukemia that  transform into a lymphoma. Sister had pancreatic cancer.  Review of Systems   Constitutional: Positive for unexpected weight change. Negative for appetite change.   HENT: Negative.    Eyes: Negative.  Negative for visual disturbance.   Respiratory: Positive for shortness of breath. Negative for cough and wheezing.    Cardiovascular: Negative.  Negative for chest pain.   Gastrointestinal: Positive for diarrhea. Negative for abdominal pain.   Genitourinary: Positive for frequency.   Musculoskeletal: Negative for back pain.   Skin: Negative for rash.   Neurological: Negative.  Negative for headaches.   Hematological: Negative for adenopathy.   Psychiatric/Behavioral: The patient is nervous/anxious.        Wt Readings from Last 3 Encounters:   17 78.4 kg (172 lb 13.5 oz)   17 77.1 kg (170 lb)   17 76 kg (167 lb 8.8 oz)     Temp Readings from Last 3 Encounters:   17 98.1 °F (36.7 °C) (Oral)   17 98.8 °F (37.1 °C)   17 98 °F (36.7 °C)     BP Readings from Last 3 Encounters:   17 130/88   17 100/62   17 137/70     Pulse Readings from Last 3 Encounters:   17 87   17 78   17 69       Objective:      Physical Exam   Constitutional: She is oriented to person, place, and time. She appears well-developed. No distress.   HENT:   Head: Normocephalic.    Right Ear: Tympanic membrane, external ear and ear canal normal.   Left Ear: Tympanic membrane, external ear and ear canal normal.   Nose: Nose normal. Right sinus exhibits no maxillary sinus tenderness and no frontal sinus tenderness. Left sinus exhibits no maxillary sinus tenderness and no frontal sinus tenderness.   Mouth/Throat: Oropharynx is clear and moist and mucous membranes are normal.   Teeth normal.  Gums normal.   Eyes: Conjunctivae and lids are normal. Pupils are equal, round, and reactive to light.   Neck: Normal carotid pulses, no hepatojugular reflux and no JVD present. Carotid bruit is not present. No tracheal deviation present. No thyroid mass and no thyromegaly present.   Cardiovascular: Normal rate, regular rhythm, S1 normal, S2 normal, normal heart sounds and intact distal pulses.  Exam reveals no gallop and no friction rub.    No murmur heard.  Carotid exam normal   Pulmonary/Chest: Effort normal and breath sounds normal. No accessory muscle usage. No respiratory distress. She has no wheezes. She has no rales. She exhibits no tenderness.   Abdominal: Soft. Normal appearance. She exhibits no distension and no mass. There is no splenomegaly or hepatomegaly. There is no tenderness. There is no rebound and no guarding.   Musculoskeletal: Normal range of motion. She exhibits no edema or tenderness.        Right hand: Normal.        Left hand: Normal.       Lymphadenopathy:     She has no cervical adenopathy.     She has no axillary adenopathy.        Right: No inguinal and no supraclavicular adenopathy present.        Left: No inguinal and no supraclavicular adenopathy present.   Neurological: She is alert and oriented to person, place, and time. She has normal strength. No cranial nerve deficit. Coordination normal.   Skin: Skin is warm and dry. No rash noted. She is not diaphoretic. No cyanosis or erythema. No pallor. Nails show no clubbing.   Psychiatric: She has a normal mood and affect. Her  behavior is normal. Judgment and thought content normal.       Wt Readings from Last 3 Encounters:   08/03/17 78.4 kg (172 lb 13.5 oz)   08/02/17 77.1 kg (170 lb)   07/24/17 76 kg (167 lb 8.8 oz)     Temp Readings from Last 3 Encounters:   08/03/17 98.1 °F (36.7 °C) (Oral)   07/24/17 98.8 °F (37.1 °C)   07/24/17 98 °F (36.7 °C)     BP Readings from Last 3 Encounters:   08/03/17 130/88   08/02/17 100/62   07/24/17 137/70     Pulse Readings from Last 3 Encounters:   08/03/17 87   08/02/17 78   07/24/17 69       Assessment:       1. Shortness of breath      2-large cell lymphoma  3-elevated BNP  Plan:       Lab Results   Component Value Date    WBC 6.42 08/02/2017    HGB 9.9 (L) 08/02/2017    HCT 31.4 (L) 08/02/2017    MCV 87 08/02/2017     (H) 08/02/2017     Lab Results   Component Value Date    CREATININE 1.0 08/02/2017     Lab Results   Component Value Date    ALT 19 08/02/2017    AST 20 08/02/2017    ALKPHOS 93 08/02/2017    BILITOT 0.3 08/02/2017       BNP: 1,103    patient was informed of the findings on the Ct/PEt and the films were reviewed with her.  She was told that until this situation is cleared, we can not give her any more treatment. Thre is the concern she might have developed CHF based on the lung findings and the elevated BNP   She will be asked to follow up with Dr Gil. ECHO/ and MUGA ordered.  See me in 2 weeks.  Very complex visit requiring discussing case with various specialists

## 2017-08-04 ENCOUNTER — CLINICAL SUPPORT (OUTPATIENT)
Dept: CARDIOLOGY | Facility: CLINIC | Age: 77
End: 2017-08-04
Payer: MEDICARE

## 2017-08-04 ENCOUNTER — OFFICE VISIT (OUTPATIENT)
Dept: CARDIOLOGY | Facility: CLINIC | Age: 77
End: 2017-08-04
Payer: MEDICARE

## 2017-08-04 ENCOUNTER — TELEPHONE (OUTPATIENT)
Dept: PULMONOLOGY | Facility: CLINIC | Age: 77
End: 2017-08-04

## 2017-08-04 ENCOUNTER — HOSPITAL ENCOUNTER (OUTPATIENT)
Dept: RADIOLOGY | Facility: HOSPITAL | Age: 77
Discharge: HOME | End: 2017-08-04
Attending: INTERNAL MEDICINE
Payer: MEDICARE

## 2017-08-04 VITALS
WEIGHT: 172.19 LBS | DIASTOLIC BLOOD PRESSURE: 82 MMHG | HEIGHT: 64 IN | BODY MASS INDEX: 29.4 KG/M2 | HEART RATE: 86 BPM | SYSTOLIC BLOOD PRESSURE: 160 MMHG

## 2017-08-04 DIAGNOSIS — I25.5 ISCHEMIC CARDIOMYOPATHY: ICD-10-CM

## 2017-08-04 DIAGNOSIS — I25.10 CAD, MULTIPLE VESSEL: Chronic | ICD-10-CM

## 2017-08-04 DIAGNOSIS — E78.5 HYPERLIPIDEMIA, UNSPECIFIED HYPERLIPIDEMIA TYPE: Chronic | ICD-10-CM

## 2017-08-04 DIAGNOSIS — R91.8 GROUND GLASS OPACITY PRESENT ON IMAGING OF LUNG: ICD-10-CM

## 2017-08-04 DIAGNOSIS — C83.33 DIFFUSE LARGE B-CELL LYMPHOMA OF INTRA-ABDOMINAL LYMPH NODES: ICD-10-CM

## 2017-08-04 DIAGNOSIS — E03.9 HYPOTHYROIDISM (ACQUIRED): Chronic | ICD-10-CM

## 2017-08-04 DIAGNOSIS — I10 ESSENTIAL HYPERTENSION: Primary | Chronic | ICD-10-CM

## 2017-08-04 DIAGNOSIS — N28.9 RENAL INSUFFICIENCY: ICD-10-CM

## 2017-08-04 DIAGNOSIS — Z95.5 S/P CORONARY ARTERY STENT PLACEMENT: ICD-10-CM

## 2017-08-04 DIAGNOSIS — Z98.84 S/P GASTRIC BYPASS: ICD-10-CM

## 2017-08-04 DIAGNOSIS — Z95.0 PACEMAKER: ICD-10-CM

## 2017-08-04 DIAGNOSIS — R06.02 SHORTNESS OF BREATH: ICD-10-CM

## 2017-08-04 DIAGNOSIS — C85.90 LYMPHOMA, UNSPECIFIED BODY REGION, UNSPECIFIED LYMPHOMA TYPE: ICD-10-CM

## 2017-08-04 DIAGNOSIS — R94.2 DIFFUSION CAPACITY OF LUNG (DL), DECREASED: Chronic | ICD-10-CM

## 2017-08-04 LAB
DIASTOLIC DYSFUNCTION: YES
ESTIMATED PA SYSTOLIC PRESSURE: 39.44
MITRAL VALVE REGURGITATION: ABNORMAL
RETIRED EF AND QEF - SEE NOTES: 50 (ref 55–65)
TRICUSPID VALVE REGURGITATION: ABNORMAL

## 2017-08-04 PROCEDURE — 1157F ADVNC CARE PLAN IN RCRD: CPT | Mod: S$GLB,,, | Performed by: INTERNAL MEDICINE

## 2017-08-04 PROCEDURE — 99499 UNLISTED E&M SERVICE: CPT | Mod: S$GLB,,, | Performed by: INTERNAL MEDICINE

## 2017-08-04 PROCEDURE — 71020 XR CHEST PA AND LATERAL: CPT | Mod: TC,PO

## 2017-08-04 PROCEDURE — 99215 OFFICE O/P EST HI 40 MIN: CPT | Mod: S$GLB,,, | Performed by: INTERNAL MEDICINE

## 2017-08-04 PROCEDURE — 93000 ELECTROCARDIOGRAM COMPLETE: CPT | Mod: S$GLB,,, | Performed by: NUCLEAR MEDICINE

## 2017-08-04 PROCEDURE — 1159F MED LIST DOCD IN RCRD: CPT | Mod: S$GLB,,, | Performed by: INTERNAL MEDICINE

## 2017-08-04 PROCEDURE — 1126F AMNT PAIN NOTED NONE PRSNT: CPT | Mod: S$GLB,,, | Performed by: INTERNAL MEDICINE

## 2017-08-04 PROCEDURE — 93306 TTE W/DOPPLER COMPLETE: CPT | Mod: S$GLB,,, | Performed by: INTERNAL MEDICINE

## 2017-08-04 PROCEDURE — 99999 PR PBB SHADOW E&M-EST. PATIENT-LVL IV: CPT | Mod: PBBFAC,,, | Performed by: INTERNAL MEDICINE

## 2017-08-04 PROCEDURE — 71020 XR CHEST PA AND LATERAL: CPT | Mod: 26,,, | Performed by: RADIOLOGY

## 2017-08-04 RX ORDER — METOPROLOL TARTRATE 25 MG/1
25 TABLET, FILM COATED ORAL 2 TIMES DAILY
Status: DISCONTINUED | OUTPATIENT
Start: 2017-08-04 | End: 2017-09-06 | Stop reason: SDUPTHER

## 2017-08-04 RX ORDER — LOSARTAN POTASSIUM 25 MG/1
25 TABLET ORAL DAILY
Qty: 90 TABLET | Refills: 3 | Status: SHIPPED | OUTPATIENT
Start: 2017-08-04 | End: 2017-11-01 | Stop reason: SDUPTHER

## 2017-08-04 RX ORDER — METOPROLOL TARTRATE 25 MG/1
25 TABLET, FILM COATED ORAL 2 TIMES DAILY
Qty: 60 TABLET | Refills: 11 | Status: SHIPPED | OUTPATIENT
Start: 2017-08-04 | End: 2018-06-27 | Stop reason: SDUPTHER

## 2017-08-04 NOTE — TELEPHONE ENCOUNTER
----- Message from Ross Corral MD sent at 8/3/2017  7:12 AM CDT -----  Schedule in2 weeks, I need to review result before repeating Chest CT. Inform patient CT may be delayed if levels still high

## 2017-08-04 NOTE — PROGRESS NOTES
"Subjective:   Patient ID:  Violet Swenson is a 77 y.o. female who presents for follow up of Coronary Artery Disease; Hypertension; and Hyperlipidemia      HPI  A 76 yo female with h/o lymphoma cad pacer s/p lcx stent 3.5 x20  in 2012 is here for f/u after starting chemotherapy she has noticed exertional shortness of breath had chest pain requiring nitro spray and had leg edema she was placed on diuretics with improvemnet of shortness of breath and the swelling improved. She had last episode of chest heaviness on Monday  It is similar to her angina prior to stenting.she ahs no orthopnea pnd no plapitation.  Past Medical History:   Diagnosis Date    Arthritis     Cancer     lymphoma    Coronary artery disease     01/2015 C patent LCX. 50% stenosis in LAD and RCA.      Depression     Encounter for blood transfusion     GERD (gastroesophageal reflux disease)     Gout, arthritis     Hx of psychiatric care     Hyperlipidemia     Hypertension     Hypothyroidism     Lung nodule 2014    RML--stable    Pacemaker     Metronic    Pneumonia     Psychiatric problem        Past Surgical History:   Procedure Laterality Date    APPENDECTOMY      CARDIAC PACEMAKER PLACEMENT  01/22/2015    CHOLECYSTECTOMY      COLONOSCOPY N/A 4/6/2017    Procedure: COLONOSCOPY;  Surgeon: Tye Enamorado MD;  Location: [facility];  Service: Endoscopy;  Laterality: N/A;    CORONARY ANGIOPLASTY  02/2014    CORONARY STENT PLACEMENT  02/05/2014    GASTRIC BYPASS      HEMICOLECTOMY Right     HERNIA REPAIR      TONSILLECTOMY      TOTAL KNEE ARTHROPLASTY Bilateral        Social History   Substance Use Topics    Smoking status: Former Smoker    Smokeless tobacco: Never Used    Alcohol use No       Family History   Problem Relation Age of Onset    Heart disease Mother     Hypertension Mother     Prostate cancer Mother     Stomach cancer Father      "ulcers that turned to cancer"    Pancreatic cancer Sister     " "Leukemia Brother      "leukemia which led to intestinal cancer"    Drug abuse Son     COPD Daughter        Current Outpatient Prescriptions   Medication Sig    albuterol 90 mcg/actuation inhaler Inhale 1-2 puffs into the lungs every 6 (six) hours as needed for Wheezing. Rescue    allopurinol (ZYLOPRIM) 100 MG tablet TAKE 2 TABLETS BY MOUTH DAILY    alprazolam (XANAX) 0.25 MG tablet Take 1 tablet (0.25 mg total) by mouth 2 (two) times daily as needed for Anxiety.    ascorbic acid, vitamin C, (VITAMIN C) 100 MG tablet Take by mouth once daily.    aspirin (ECOTRIN) 81 MG EC tablet Take 81 mg by mouth nightly.     clopidogrel (PLAVIX) 75 mg tablet Take 1 tablet (75 mg total) by mouth once daily. (Patient taking differently: Take 75 mg by mouth nightly. )    COLCRYS 0.6 mg tablet TAKE 1 TABLET(0.6 MG) BY MOUTH EVERY DAY    ergocalciferol, vitamin D2, 400 unit Tab Take 1 tablet by mouth once daily.     fluticasone (FLONASE) 50 mcg/actuation nasal spray 2 sprays by Each Nare route 2 (two) times daily as needed for Allergies.     furosemide (LASIX) 20 MG tablet Take 1 tablet (20 mg total) by mouth once daily.    gabapentin (NEURONTIN) 100 MG capsule TAKE 1 CAPSULE(100 MG) BY MOUTH THREE TIMES DAILY    isosorbide mononitrate (IMDUR) 30 MG 24 hr tablet Take 1 tablet (30 mg total) by mouth once daily. (Patient taking differently: Take 30 mg by mouth nightly. )    levothyroxine (SYNTHROID) 75 MCG tablet TAKE 1 TABLET(75 MCG) BY MOUTH BEFORE BREAKFAST    meloxicam (MOBIC) 7.5 MG tablet TAKE 1 TABLET BY MOUTH EVERY DAY AS NEEDED FOR PAIN (Patient taking differently: TAKE 1 TABLET BY MOUTH DAILY FOR PAIN)    mirtazapine (REMERON SOL-TAB) 15 MG disintegrating tablet Take 1 tablet (15 mg total) by mouth nightly.    multivitamin (ONE DAILY MULTIVITAMIN) per tablet Take 1 tablet by mouth once daily.    nitroglycerin 400 mcg/spray SprA Place 1 spray under the tongue every 5 (five) minutes as needed for Chest pain.  "    ondansetron (ZOFRAN-ODT) 8 MG TbDL Take 1 tablet (8 mg total) by mouth every 12 (twelve) hours as needed (Nausea).    predniSONE (DELTASONE) 50 MG Tab One tablet po bid (Patient taking differently: Take 50 mg by mouth 2 (two) times daily. One tablet po bid for 5 days starting on day 1 of each [facility] chemotherapy cycle)    rosuvastatin (CRESTOR) 10 MG tablet Take 1 tablet (10 mg total) by mouth every evening.    sertraline (ZOLOFT) 100 MG tablet Take 1 tablet (100 mg total) by mouth once daily.    verapamil (CALAN-SR) 120 MG CR tablet TAKE 1/2 TABLET BY MOUTH NIGHTLY    ZINC ACETATE ORAL Take 250 mg by mouth once daily.     inhalation spacing device (ClearStar AEROCHAMBER) Use as directed for inhalation.     No current facility-administered medications for this visit.      Current Outpatient Prescriptions on File Prior to Visit   Medication Sig    albuterol 90 mcg/actuation inhaler Inhale 1-2 puffs into the lungs every 6 (six) hours as needed for Wheezing. Rescue    allopurinol (ZYLOPRIM) 100 MG tablet TAKE 2 TABLETS BY MOUTH DAILY    alprazolam (XANAX) 0.25 MG tablet Take 1 tablet (0.25 mg total) by mouth 2 (two) times daily as needed for Anxiety.    ascorbic acid, vitamin C, (VITAMIN C) 100 MG tablet Take by mouth once daily.    aspirin (ECOTRIN) 81 MG EC tablet Take 81 mg by mouth nightly.     clopidogrel (PLAVIX) 75 mg tablet Take 1 tablet (75 mg total) by mouth once daily. (Patient taking differently: Take 75 mg by mouth nightly. )    COLCRYS 0.6 mg tablet TAKE 1 TABLET(0.6 MG) BY MOUTH EVERY DAY    ergocalciferol, vitamin D2, 400 unit Tab Take 1 tablet by mouth once daily.     fluticasone (FLONASE) 50 mcg/actuation nasal spray 2 sprays by Each Nare route 2 (two) times daily as needed for Allergies.     furosemide (LASIX) 20 MG tablet Take 1 tablet (20 mg total) by mouth once daily.    gabapentin (NEURONTIN) 100 MG capsule TAKE 1 CAPSULE(100 MG) BY MOUTH THREE TIMES DAILY    isosorbide  mononitrate (IMDUR) 30 MG 24 hr tablet Take 1 tablet (30 mg total) by mouth once daily. (Patient taking differently: Take 30 mg by mouth nightly. )    levothyroxine (SYNTHROID) 75 MCG tablet TAKE 1 TABLET(75 MCG) BY MOUTH BEFORE BREAKFAST    meloxicam (MOBIC) 7.5 MG tablet TAKE 1 TABLET BY MOUTH EVERY DAY AS NEEDED FOR PAIN (Patient taking differently: TAKE 1 TABLET BY MOUTH DAILY FOR PAIN)    mirtazapine (REMERON SOL-TAB) 15 MG disintegrating tablet Take 1 tablet (15 mg total) by mouth nightly.    multivitamin (ONE DAILY MULTIVITAMIN) per tablet Take 1 tablet by mouth once daily.    nitroglycerin 400 mcg/spray SprA Place 1 spray under the tongue every 5 (five) minutes as needed for Chest pain.     ondansetron (ZOFRAN-ODT) 8 MG TbDL Take 1 tablet (8 mg total) by mouth every 12 (twelve) hours as needed (Nausea).    predniSONE (DELTASONE) 50 MG Tab One tablet po bid (Patient taking differently: Take 50 mg by mouth 2 (two) times daily. One tablet po bid for 5 days starting on day 1 of each [facility] chemotherapy cycle)    rosuvastatin (CRESTOR) 10 MG tablet Take 1 tablet (10 mg total) by mouth every evening.    sertraline (ZOLOFT) 100 MG tablet Take 1 tablet (100 mg total) by mouth once daily.    verapamil (CALAN-SR) 120 MG CR tablet TAKE 1/2 TABLET BY MOUTH NIGHTLY    ZINC ACETATE ORAL Take 250 mg by mouth once daily.     inhalation spacing device (RITEFLO AEROCHAMBER) Use as directed for inhalation.    [DISCONTINUED] hydrochlorothiazide (HYDRODIURIL) 12.5 MG Tab TAKE 1 TABLET BY MOUTH ONCE DAILY     No current facility-administered medications on file prior to visit.      Review of patient's allergies indicates:   Allergen Reactions    Corticosteroids (glucocorticoids) Nausea Only and Other (See Comments)     Stomach pain, dizziness, headache    Oxycodone Other (See Comments)     Blood pressure dropped       Review of Systems   Constitution: Negative for diaphoresis, weakness, malaise/fatigue and weight  "gain.   HENT: Negative for headaches and hoarse voice.    Eyes: Negative for double vision and visual disturbance.   Cardiovascular: Positive for chest pain, dyspnea on exertion and leg swelling. Negative for claudication, cyanosis, irregular heartbeat, near-syncope, orthopnea, palpitations, paroxysmal nocturnal dyspnea and syncope.   Respiratory: Positive for shortness of breath. Negative for cough, hemoptysis and snoring.    Endocrine:        Hair loss   Hematologic/Lymphatic: Negative for bleeding problem. Does not bruise/bleed easily.   Skin: Negative for color change and poor wound healing.   Musculoskeletal: Negative for muscle cramps, muscle weakness and myalgias.   Gastrointestinal: Negative for bloating, abdominal pain, change in bowel habit, diarrhea, heartburn, hematemesis, hematochezia, melena and nausea.   Neurological: Negative for excessive daytime sleepiness, dizziness, light-headedness, loss of balance and numbness.   Psychiatric/Behavioral: Negative for memory loss. The patient does not have insomnia.    Allergic/Immunologic: Negative for hives.       Objective:   Physical Exam  Vitals:    08/04/17 1311 08/04/17 1312   BP: (!) 150/78 (!) 160/82   BP Location: Right arm Left arm   Patient Position: Sitting Sitting   BP Method: Manual Manual   Pulse: 86    Weight: 78.1 kg (172 lb 2.9 oz)    Height: 5' 4" (1.626 m)    Constitutional: She is oriented to person, place, and time. She appears well-developed and well-nourished. She does not appear ill. No distress.   HENT:   Head: Normocephalic and atraumatic.   Eyes: EOM are normal. Pupils are equal, round, and reactive to light. No scleral icterus.   Neck: Normal range of motion. Neck supple. Normal carotid pulses, no hepatojugular reflux and no JVD present. Carotid bruit is not present. No tracheal deviation present. No thyromegaly present.   Cardiovascular: Normal rate, regular rhythm, normal heart sounds, intact distal pulses and normal pulses.  Exam " reveals no gallop and no friction rub.    No murmur heard.  Pulmonary/Chest: Effort normal and breath sounds normal. No respiratory distress. She has no wheezes. She has no rhonchi. She has no rales. She exhibits no tenderness.   Pacer site well healed  has rt tender mobile mass on the rt side of sternum around 2x3 cm tender.  Abdominal: Soft. Normal appearance, normal aorta and bowel sounds are normal. She exhibits no distension, no abdominal bruit, no ascites and no pulsatile midline mass. There is no hepatomegaly. There is no tenderness.   Musculoskeletal: She exhibits trace  edema.    Varicose veins in legs noted.   Neurological: She is alert and oriented to person, place, and time. She has normal strength. No cranial nerve deficit. Coordination normal.   Skin: Skin is warm and dry. No rash noted. She is not diaphoretic. No cyanosis or erythema. Nails show no clubbing.   Psychiatric: She has a normal mood and affect. Her speech is normal and behavior is normal.   Nursing note and vitals reviewed.     Lab Results   Component Value Date    CHOL 138 04/05/2017    CHOL 114 (L) 11/07/2016    CHOL 107 (L) 10/03/2016     Lab Results   Component Value Date    HDL 58 04/05/2017    HDL 51 11/07/2016    HDL 52 10/03/2016     Lab Results   Component Value Date    LDLCALC 62.8 (L) 04/05/2017    LDLCALC 41.8 (L) 11/07/2016    LDLCALC 38.6 (L) 10/03/2016     Lab Results   Component Value Date    TRIG 86 04/05/2017    TRIG 106 11/07/2016    TRIG 82 10/03/2016     Lab Results   Component Value Date    CHOLHDL 42.0 04/05/2017    CHOLHDL 44.7 11/07/2016    CHOLHDL 48.6 10/03/2016       Chemistry        Component Value Date/Time     08/02/2017 0858    K 4.7 08/02/2017 0858     (H) 08/02/2017 0858    CO2 19 (L) 08/02/2017 0858    BUN 22 08/02/2017 0858    CREATININE 1.0 08/02/2017 0858    GLU 82 08/02/2017 0858        Component Value Date/Time    CALCIUM 8.9 08/02/2017 0858    ALKPHOS 93 08/02/2017 0858    AST 20  08/02/2017 0858    ALT 19 08/02/2017 0858    BILITOT 0.3 08/02/2017 0858    ESTGFRAFRICA >60 08/02/2017 0858    EGFRNONAA 55 (A) 08/02/2017 0858          Lab Results   Component Value Date    TSH 2.702 04/06/2017     Lab Results   Component Value Date    INR 1.0 04/05/2017     Lab Results   Component Value Date    WBC 6.42 08/02/2017    HGB 9.9 (L) 08/02/2017    HCT 31.4 (L) 08/02/2017    MCV 87 08/02/2017     (H) 08/02/2017     BMP  Sodium   Date Value Ref Range Status   08/02/2017 142 136 - 145 mmol/L Final     Potassium   Date Value Ref Range Status   08/02/2017 4.7 3.5 - 5.1 mmol/L Final     Chloride   Date Value Ref Range Status   08/02/2017 117 (H) 95 - 110 mmol/L Final     CO2   Date Value Ref Range Status   08/02/2017 19 (L) 23 - 29 mmol/L Final     BUN, Bld   Date Value Ref Range Status   08/02/2017 22 8 - 23 mg/dL Final     Creatinine   Date Value Ref Range Status   08/02/2017 1.0 0.5 - 1.4 mg/dL Final     Calcium   Date Value Ref Range Status   08/02/2017 8.9 8.7 - 10.5 mg/dL Final     Anion Gap   Date Value Ref Range Status   08/02/2017 6 (L) 8 - 16 mmol/L Final     eGFR if    Date Value Ref Range Status   08/02/2017 >60 >60 mL/min/1.73 m^2 Final     eGFR if non    Date Value Ref Range Status   08/02/2017 55 (A) >60 mL/min/1.73 m^2 Final     Comment:     Calculation used to obtain the estimated glomerular filtration  rate (eGFR) is the CKD-EPI equation. Since race is unknown   in our information system, the eGFR values for   -American and Non--American patients are given   for each creatinine result.       Estimated Creatinine Clearance: 48.4 mL/min (based on Cr of 1).    I reviewed echo it seems her ef has dropped to 50% she has more wall motion abnormality in the lad distribution despite having paced rhythm,.concerned that this is not only cardiomyopathy and chf from chemotherapy but an element of cad.  Assessment:     1. Essential hypertension    2.  Hyperlipidemia, unspecified hyperlipidemia type    3. Hypothyroidism (acquired)    4. CAD, multiple vessel    5. Diffusion capacity of lung (dl), decreased    6. S/P coronary artery stent placement    7. Pacemaker    8. Renal insufficiency    9. Ischemic cardiomyopathy    10. Diffuse large B-cell lymphoma of intra-abdominal lymph nodes    11. Lymphoma, unspecified body region, unspecified lymphoma type    12. S/P gastric bypass    13. Ground glass opacity present on imaging of lung      Reviewed imaging studies echo pulmonary eval she has clinical chf responded to diuretics she is on no b blockers and is on verapamil I am concerned that she also has ischemia in addition to her adriamycin causing lv dysfunction. Would like to optimize medical therapy   Also discussed the case with DR SHIPMAN THAT ATLKED BY PHONE WITH DR DIALLO ABOUT THE MASS WILL NEED TO OBTAIN BIOPSY  Plan:   D/C VERAPAMIL  LOTENSIN 25 MG PO DAILY  LOPRESSOR 25 MG PO BID  REFER TO DR LUNA TO BIOPSY THE MASS   LEXISCAN   F/U AFTERWARDS   LOW SALT DIET   CONTINUE DIURETICS

## 2017-08-07 ENCOUNTER — OFFICE VISIT (OUTPATIENT)
Dept: SURGERY | Facility: CLINIC | Age: 77
End: 2017-08-07
Payer: MEDICARE

## 2017-08-07 VITALS
WEIGHT: 165.13 LBS | BODY MASS INDEX: 28.34 KG/M2 | HEART RATE: 57 BPM | TEMPERATURE: 98 F | SYSTOLIC BLOOD PRESSURE: 139 MMHG | DIASTOLIC BLOOD PRESSURE: 89 MMHG

## 2017-08-07 DIAGNOSIS — R22.2 CHEST WALL MASS: Primary | ICD-10-CM

## 2017-08-07 PROCEDURE — 88305 TISSUE EXAM BY PATHOLOGIST: CPT | Mod: 26,,, | Performed by: PATHOLOGY

## 2017-08-07 PROCEDURE — 1159F MED LIST DOCD IN RCRD: CPT | Mod: S$GLB,,, | Performed by: SURGERY

## 2017-08-07 PROCEDURE — 3008F BODY MASS INDEX DOCD: CPT | Mod: S$GLB,,, | Performed by: SURGERY

## 2017-08-07 PROCEDURE — 99499 UNLISTED E&M SERVICE: CPT | Mod: S$GLB,,, | Performed by: SURGERY

## 2017-08-07 PROCEDURE — 11100 PR BIOPSY OF SKIN LESION: CPT | Mod: S$GLB,,, | Performed by: SURGERY

## 2017-08-07 PROCEDURE — 1157F ADVNC CARE PLAN IN RCRD: CPT | Mod: S$GLB,,, | Performed by: SURGERY

## 2017-08-07 PROCEDURE — 88305 TISSUE EXAM BY PATHOLOGIST: CPT | Performed by: PATHOLOGY

## 2017-08-07 PROCEDURE — 1126F AMNT PAIN NOTED NONE PRSNT: CPT | Mod: S$GLB,,, | Performed by: SURGERY

## 2017-08-07 PROCEDURE — 99212 OFFICE O/P EST SF 10 MIN: CPT | Mod: 25,S$GLB,, | Performed by: SURGERY

## 2017-08-07 PROCEDURE — 99999 PR PBB SHADOW E&M-EST. PATIENT-LVL III: CPT | Mod: PBBFAC,,, | Performed by: SURGERY

## 2017-08-07 NOTE — PROGRESS NOTES
"Subjective:       Patient ID: Violet Swenson is a 77 y.o. female.    Chief Complaint: Consult    HPI   S/p right hemicolectomy 4/7/17 status post port placement in chemotherapy presents for chest wall mass eval.  She recently had a chest wall nodule noted by her cardiologist Dr. Gil.  It does not cause her pain but she does not remember being there previously.  On her recent PET scan in July it is present however is not PET avid.  The PET scan in April appears very faintly present and much smaller but once again not PET avid.      Past Medical History:   Diagnosis Date    Arthritis     Cancer     lymphoma    Coronary artery disease     01/2015 LHC patent LCX. 50% stenosis in LAD and RCA.      Depression     Encounter for blood transfusion     GERD (gastroesophageal reflux disease)     Gout, arthritis     Hx of psychiatric care     Hyperlipidemia     Hypertension     Hypothyroidism     Lung nodule 2014    RML--stable    Pacemaker     Metronic    Pneumonia     Psychiatric problem        Past Surgical History:   Procedure Laterality Date    APPENDECTOMY      CARDIAC PACEMAKER PLACEMENT  01/22/2015    CHOLECYSTECTOMY      COLONOSCOPY N/A 4/6/2017    Procedure: COLONOSCOPY;  Surgeon: Tye Enamorado MD;  Location: [facility];  Service: Endoscopy;  Laterality: N/A;    CORONARY ANGIOPLASTY  02/2014    CORONARY STENT PLACEMENT  02/05/2014    GASTRIC BYPASS      HEMICOLECTOMY Right     HERNIA REPAIR      TONSILLECTOMY      TOTAL KNEE ARTHROPLASTY Bilateral        Family History   Problem Relation Age of Onset    Heart disease Mother     Hypertension Mother     Prostate cancer Mother     Stomach cancer Father      "ulcers that turned to cancer"    Pancreatic cancer Sister     Leukemia Brother      "leukemia which led to intestinal cancer"    Drug abuse Son     COPD Daughter        Social History     Social History    Marital status:      Spouse name: N/A    Number of " children: 4    Years of education: N/A     Social History Main Topics    Smoking status: Former Smoker    Smokeless tobacco: Never Used    Alcohol use No    Drug use: No    Sexual activity: Not Asked     Other Topics Concern    None     Social History Narrative    4 children (3 natural born, 1 adopted)- 2 ; 2x  (currently 17 years); son has prostate cancer, daughter COPD,  cardiac difficulty       Current Outpatient Prescriptions   Medication Sig Dispense Refill    albuterol 90 mcg/actuation inhaler Inhale 1-2 puffs into the lungs every 6 (six) hours as needed for Wheezing. Rescue 18 g 0    allopurinol (ZYLOPRIM) 100 MG tablet TAKE 2 TABLETS BY MOUTH DAILY 180 tablet 0    alprazolam (XANAX) 0.25 MG tablet Take 1 tablet (0.25 mg total) by mouth 2 (two) times daily as needed for Anxiety. 60 tablet 5    ascorbic acid, vitamin C, (VITAMIN C) 100 MG tablet Take by mouth once daily.      aspirin (ECOTRIN) 81 MG EC tablet Take 81 mg by mouth nightly.       clopidogrel (PLAVIX) 75 mg tablet Take 1 tablet (75 mg total) by mouth once daily. (Patient taking differently: Take 75 mg by mouth nightly. ) 90 tablet 3    COLCRYS 0.6 mg tablet TAKE 1 TABLET(0.6 MG) BY MOUTH EVERY DAY 30 tablet 3    ergocalciferol, vitamin D2, 400 unit Tab Take 1 tablet by mouth once daily.       fluticasone (FLONASE) 50 mcg/actuation nasal spray 2 sprays by Each Nare route 2 (two) times daily as needed for Allergies.       furosemide (LASIX) 20 MG tablet Take 1 tablet (20 mg total) by mouth once daily. 30 tablet 0    gabapentin (NEURONTIN) 100 MG capsule TAKE 1 CAPSULE(100 MG) BY MOUTH THREE TIMES DAILY 90 capsule 3    inhalation spacing device (RITEFLO AEROCHAMBER) Use as directed for inhalation. 1 Device 0    isosorbide mononitrate (IMDUR) 30 MG 24 hr tablet Take 1 tablet (30 mg total) by mouth once daily. (Patient taking differently: Take 30 mg by mouth nightly. ) 90 tablet 3    levothyroxine (SYNTHROID)  75 MCG tablet TAKE 1 TABLET(75 MCG) BY MOUTH BEFORE BREAKFAST 90 tablet 1    losartan (COZAAR) 25 MG tablet Take 1 tablet (25 mg total) by mouth once daily. 90 tablet 3    meloxicam (MOBIC) 7.5 MG tablet TAKE 1 TABLET BY MOUTH EVERY DAY AS NEEDED FOR PAIN (Patient taking differently: TAKE 1 TABLET BY MOUTH DAILY FOR PAIN) 90 tablet 0    metoprolol tartrate (LOPRESSOR) 25 MG tablet Take 1 tablet (25 mg total) by mouth 2 (two) times daily. 60 tablet 11    mirtazapine (REMERON SOL-TAB) 15 MG disintegrating tablet Take 1 tablet (15 mg total) by mouth nightly. 30 tablet 2    multivitamin (ONE DAILY MULTIVITAMIN) per tablet Take 1 tablet by mouth once daily.      nitroglycerin 400 mcg/spray SprA Place 1 spray under the tongue every 5 (five) minutes as needed for Chest pain.       ondansetron (ZOFRAN-ODT) 8 MG TbDL Take 1 tablet (8 mg total) by mouth every 12 (twelve) hours as needed (Nausea). 30 tablet 2    predniSONE (DELTASONE) 50 MG Tab One tablet po bid (Patient taking differently: Take 50 mg by mouth 2 (two) times daily. One tablet po bid for 5 days starting on day 1 of each [facility] chemotherapy cycle) 10 tablet 6    rosuvastatin (CRESTOR) 10 MG tablet Take 1 tablet (10 mg total) by mouth every evening. 30 tablet 5    sertraline (ZOLOFT) 100 MG tablet Take 1 tablet (100 mg total) by mouth once daily. 90 tablet 3    ZINC ACETATE ORAL Take 250 mg by mouth once daily.        Current Facility-Administered Medications   Medication Dose Route Frequency Provider Last Rate Last Dose    metoprolol tartrate (LOPRESSOR) tablet 25 mg  25 mg Oral BID Nader Gil MD           Review of patient's allergies indicates:   Allergen Reactions    Corticosteroids (glucocorticoids) Nausea Only and Other (See Comments)     Stomach pain, dizziness, headache    Oxycodone Other (See Comments)     Blood pressure dropped       Review of Systems   Constitutional: Negative for chills and fever.   HENT: Negative for congestion.     Eyes: Negative for visual disturbance.   Respiratory: Negative for cough and shortness of breath.    Cardiovascular: Negative for chest pain, palpitations and leg swelling.   Gastrointestinal: Negative for abdominal distention, abdominal pain, constipation, diarrhea, nausea and vomiting.   Endocrine: Negative for polyuria.   Genitourinary: Negative for dysuria.   Skin: Negative for rash.   Neurological: Negative for dizziness and light-headedness.   Hematological: Negative for adenopathy.       Objective:      Physical Exam   Constitutional: She is oriented to person, place, and time. She appears well-developed and well-nourished. No distress.   HENT:   Head: Normocephalic and atraumatic.   Eyes: EOM are normal.   Neck: Neck supple.   Cardiovascular: Normal rate and regular rhythm.    Pulmonary/Chest: Effort normal and breath sounds normal.       Left chest pacemaker   Abdominal: Soft. Bowel sounds are normal. She exhibits no distension. There is no tenderness.   Neurological: She is alert and oriented to person, place, and time.   Skin: Skin is warm and dry.   Vitals reviewed.      FINAL PATHOLOGIC DIAGNOSIS  1. COLON MASS, PARTIAL COLECTOMY- DIFFUSE LARGE B-CELL LYMPHOMA, GERMINAL CENTER  ORIGIN.  Assessment:   S/p right hemicolectomy status post port a catheter with B-cell lymphoma with new chest wall mass  Plan:       -Core needle biopsy today in clinic  -Call patient with results and we'll schedule follow-up at that time for excision as patient would like to have it removed if possible    Procedure:  Area prepped and draped in the usual sterile fashion.  Local anesthetic injected over the site.  Skin nick made with 15 blade.  4 passes with the Sal-Cut biopsy taken.  Bandage placed patient tolerated procedure well

## 2017-08-08 ENCOUNTER — HOSPITAL ENCOUNTER (OUTPATIENT)
Dept: RADIOLOGY | Facility: HOSPITAL | Age: 77
Discharge: HOME | End: 2017-08-08
Attending: INTERNAL MEDICINE
Payer: MEDICARE

## 2017-08-08 ENCOUNTER — CLINICAL SUPPORT (OUTPATIENT)
Dept: CARDIOLOGY | Facility: CLINIC | Age: 77
End: 2017-08-08
Payer: MEDICARE

## 2017-08-08 DIAGNOSIS — Z95.5 S/P CORONARY ARTERY STENT PLACEMENT: ICD-10-CM

## 2017-08-08 DIAGNOSIS — I25.5 ISCHEMIC CARDIOMYOPATHY: ICD-10-CM

## 2017-08-08 DIAGNOSIS — M1A.09X0 IDIOPATHIC CHRONIC GOUT OF MULTIPLE SITES WITHOUT TOPHUS: ICD-10-CM

## 2017-08-08 DIAGNOSIS — C85.90 LYMPHOMA, UNSPECIFIED BODY REGION, UNSPECIFIED LYMPHOMA TYPE: ICD-10-CM

## 2017-08-08 DIAGNOSIS — I25.10 CAD, MULTIPLE VESSEL: Chronic | ICD-10-CM

## 2017-08-08 DIAGNOSIS — E03.9 HYPOTHYROIDISM (ACQUIRED): ICD-10-CM

## 2017-08-08 LAB — DIASTOLIC DYSFUNCTION: NO

## 2017-08-08 PROCEDURE — 93015 CV STRESS TEST SUPVJ I&R: CPT | Mod: S$GLB,,, | Performed by: INTERNAL MEDICINE

## 2017-08-08 PROCEDURE — 78452 HT MUSCLE IMAGE SPECT MULT: CPT | Mod: TC,PO

## 2017-08-08 PROCEDURE — 78452 HT MUSCLE IMAGE SPECT MULT: CPT | Mod: 26,,, | Performed by: INTERNAL MEDICINE

## 2017-08-08 RX ORDER — LEVOTHYROXINE SODIUM 75 UG/1
TABLET ORAL
Qty: 90 TABLET | Refills: 1 | Status: SHIPPED | OUTPATIENT
Start: 2017-08-08 | End: 2017-12-18 | Stop reason: SDUPTHER

## 2017-08-08 RX ORDER — ALLOPURINOL 100 MG/1
TABLET ORAL
Qty: 180 TABLET | Refills: 0 | Status: SHIPPED | OUTPATIENT
Start: 2017-08-08 | End: 2017-08-16 | Stop reason: SDUPTHER

## 2017-08-09 ENCOUNTER — TELEPHONE (OUTPATIENT)
Dept: CARDIOLOGY | Facility: CLINIC | Age: 77
End: 2017-08-09

## 2017-08-09 NOTE — TELEPHONE ENCOUNTER
----- Message from Nader Gil MD sent at 8/8/2017  7:49 PM CDT -----  Stress test no ischemia mildy decrease ef

## 2017-08-14 ENCOUNTER — TELEPHONE (OUTPATIENT)
Dept: SURGERY | Facility: CLINIC | Age: 77
End: 2017-08-14

## 2017-08-14 NOTE — TELEPHONE ENCOUNTER
Gave patient instructions to stop plavix 5 days before procedure but can continue taking 81mg aspirin, patient voiced understandings.

## 2017-08-14 NOTE — TELEPHONE ENCOUNTER
----- Message from Orlando Moulton MD sent at 8/11/2017  4:14 PM CDT -----  Regarding: RE:  5 days for the plavix and if Asa 81 mg can continue.    Thanks  ----- Message -----  From: Marleen Roman LPN  Sent: 8/11/2017  11:27 AM  To: Orlando Moulton MD    Patient is on plavix and aspirin, how long does she need to be off??    ----- Message -----  From: Orlando Moulton MD  Sent: 8/11/2017   9:27 AM  To: Marleen Roman LPN    Patient needs procedure appt to excise chest wall nodule in minors.     Thanks

## 2017-08-16 ENCOUNTER — LAB VISIT (OUTPATIENT)
Dept: LAB | Facility: HOSPITAL | Age: 77
End: 2017-08-16
Attending: INTERNAL MEDICINE
Payer: MEDICARE

## 2017-08-16 ENCOUNTER — OFFICE VISIT (OUTPATIENT)
Dept: RHEUMATOLOGY | Facility: CLINIC | Age: 77
End: 2017-08-16
Payer: MEDICARE

## 2017-08-16 VITALS
SYSTOLIC BLOOD PRESSURE: 107 MMHG | DIASTOLIC BLOOD PRESSURE: 68 MMHG | BODY MASS INDEX: 27.7 KG/M2 | WEIGHT: 161.38 LBS | HEART RATE: 90 BPM

## 2017-08-16 DIAGNOSIS — M1A.09X0 IDIOPATHIC CHRONIC GOUT OF MULTIPLE SITES WITHOUT TOPHUS: ICD-10-CM

## 2017-08-16 DIAGNOSIS — M15.9 PRIMARY OSTEOARTHRITIS INVOLVING MULTIPLE JOINTS: ICD-10-CM

## 2017-08-16 DIAGNOSIS — I25.5 ISCHEMIC CARDIOMYOPATHY: ICD-10-CM

## 2017-08-16 DIAGNOSIS — M1A.09X0 IDIOPATHIC CHRONIC GOUT OF MULTIPLE SITES WITHOUT TOPHUS: Primary | ICD-10-CM

## 2017-08-16 DIAGNOSIS — R79.89 ELEVATED BRAIN NATRIURETIC PEPTIDE (BNP) LEVEL: ICD-10-CM

## 2017-08-16 DIAGNOSIS — R91.8 GROUND GLASS OPACITY PRESENT ON IMAGING OF LUNG: ICD-10-CM

## 2017-08-16 LAB
BNP SERPL-MCNC: 787 PG/ML
URATE SERPL-MCNC: 6.3 MG/DL

## 2017-08-16 PROCEDURE — 3078F DIAST BP <80 MM HG: CPT | Mod: S$GLB,,, | Performed by: INTERNAL MEDICINE

## 2017-08-16 PROCEDURE — 3074F SYST BP LT 130 MM HG: CPT | Mod: S$GLB,,, | Performed by: INTERNAL MEDICINE

## 2017-08-16 PROCEDURE — 3008F BODY MASS INDEX DOCD: CPT | Mod: S$GLB,,, | Performed by: INTERNAL MEDICINE

## 2017-08-16 PROCEDURE — 1159F MED LIST DOCD IN RCRD: CPT | Mod: S$GLB,,, | Performed by: INTERNAL MEDICINE

## 2017-08-16 PROCEDURE — 1157F ADVNC CARE PLAN IN RCRD: CPT | Mod: S$GLB,,, | Performed by: INTERNAL MEDICINE

## 2017-08-16 PROCEDURE — 84550 ASSAY OF BLOOD/URIC ACID: CPT | Mod: PO

## 2017-08-16 PROCEDURE — 99214 OFFICE O/P EST MOD 30 MIN: CPT | Mod: S$GLB,,, | Performed by: INTERNAL MEDICINE

## 2017-08-16 PROCEDURE — 1126F AMNT PAIN NOTED NONE PRSNT: CPT | Mod: S$GLB,,, | Performed by: INTERNAL MEDICINE

## 2017-08-16 PROCEDURE — 36415 COLL VENOUS BLD VENIPUNCTURE: CPT | Mod: PO

## 2017-08-16 PROCEDURE — 99999 PR PBB SHADOW E&M-EST. PATIENT-LVL III: CPT | Mod: PBBFAC,,, | Performed by: INTERNAL MEDICINE

## 2017-08-16 PROCEDURE — 83880 ASSAY OF NATRIURETIC PEPTIDE: CPT

## 2017-08-16 PROCEDURE — 99499 UNLISTED E&M SERVICE: CPT | Mod: S$GLB,,, | Performed by: INTERNAL MEDICINE

## 2017-08-16 RX ORDER — ALLOPURINOL 300 MG/1
300 TABLET ORAL DAILY
Qty: 90 TABLET | Refills: 1 | Status: SHIPPED | OUTPATIENT
Start: 2017-08-16 | End: 2018-01-19 | Stop reason: SDUPTHER

## 2017-08-16 RX ORDER — COLCHICINE 0.6 MG/1
0.6 TABLET ORAL DAILY
Qty: 90 TABLET | Refills: 3 | Status: SHIPPED | OUTPATIENT
Start: 2017-08-16 | End: 2018-02-14 | Stop reason: SDUPTHER

## 2017-08-16 NOTE — PROGRESS NOTES
RHEUMATOLOGY CLINIC FOLLOW UP VISIT  Chief complaints:-  To follow-up for gout.  My joints hurt.    HPI:-  Violet Zhao a 77 y.o. pleasant female comes in for a follow up visit with above chief complaints.  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  She reports having recurrent flareups of gout and worsening pain from her osteoarthritis since she stopped taking turmeric.  Since last visit she has been diagnosed with lymphoma and has underwent chemotherapy.  She has morning stiffness over large joints lasting for 30 minutes.  She intermittently has swelling over large joints with pain.      Review of Systems   Constitutional: Positive for malaise/fatigue and weight loss. Negative for chills and fever.   HENT: Negative for nosebleeds and sore throat.    Eyes: Negative for blurred vision, photophobia and redness.   Respiratory: Negative for cough, sputum production and shortness of breath.    Cardiovascular: Negative for chest pain and leg swelling.   Gastrointestinal: Negative for abdominal pain, constipation and diarrhea.   Genitourinary: Negative for dysuria, frequency and urgency.   Musculoskeletal: Positive for back pain, joint pain and neck pain. Negative for falls and myalgias.   Skin: Negative for itching and rash.   Neurological: Negative for dizziness, tremors, seizures, loss of consciousness, weakness and headaches.   Endo/Heme/Allergies: Negative for environmental allergies. Does not bruise/bleed easily.   Psychiatric/Behavioral: Negative for hallucinations and memory loss. The patient does not have insomnia.        Past Medical History:   Diagnosis Date    Arthritis     Cancer     lymphoma    Coronary artery disease     01/2015 [facility] patent LCX. 50% stenosis in LAD and RCA.      Depression     Encounter for blood transfusion     GERD (gastroesophageal reflux disease)     Gout, arthritis     Hx  "of psychiatric care     Hyperlipidemia     Hypertension     Hypothyroidism     Lung nodule 2014    RML--stable    Pacemaker     Metronic    Pneumonia     Psychiatric problem        Past Surgical History:   Procedure Laterality Date    APPENDECTOMY      CARDIAC PACEMAKER PLACEMENT  01/22/2015    CHOLECYSTECTOMY      COLONOSCOPY N/A 4/6/2017    Procedure: COLONOSCOPY;  Surgeon: Tye Enamorado MD;  Location[facility];  Service: Endoscopy;  Laterality: N/A;    CORONARY ANGIOPLASTY  02/2014    CORONARY STENT PLACEMENT  02/05/2014    GASTRIC BYPASS      HEMICOLECTOMY Right     HERNIA REPAIR      TONSILLECTOMY      TOTAL KNEE ARTHROPLASTY Bilateral         Social History   Substance Use Topics    Smoking status: Former Smoker    Smokeless tobacco: Never Used    Alcohol use No       Family History   Problem Relation Age of Onset    Heart disease Mother     Hypertension Mother     Prostate cancer Mother     Stomach cancer Father      "ulcers that turned to cancer"    Pancreatic cancer Sister     Leukemia Brother      "leukemia which led to intestinal cancer"    Drug abuse Son     COPD Daughter        Review of patient's allergies indicates:   Allergen Reactions    Corticosteroids (glucocorticoids) Nausea Only and Other (See Comments)     Stomach pain, dizziness, headache    Oxycodone Other (See Comments)     Blood pressure dropped           Physical examination:-    Vitals:    08/16/17 0943   BP: 107/68   Pulse: 90   Weight: 73.2 kg (161 lb 6 oz)   PainSc: 0-No pain       Physical Exam   Constitutional: She is oriented to person, place, and time and well-developed, well-nourished, and in no distress. No distress.   HENT:   Head: Normocephalic.   Mouth/Throat: Oropharynx is clear and moist.   Eyes: Conjunctivae and EOM are normal. Pupils are equal, round, and reactive to light.   Neck: Normal range of motion. Neck supple.   Cardiovascular: Normal rate and intact distal pulses.  "   Pulmonary/Chest: Effort normal. No respiratory distress.   Abdominal: Soft. There is no tenderness.   Musculoskeletal:   No synovitis over small joints of hands or feet.  No effusion over large joints.   Neurological: She is alert and oriented to person, place, and time. No cranial nerve deficit.   Skin: Skin is warm. No rash noted. No erythema.   Psychiatric: Mood and affect normal.   Nursing note and vitals reviewed.      Labs:-  Results for PHUONG JUAN (MRN [id number]) as of 8/16/2017 09:42   Ref. Range 10/3/2016 10:25 1/25/2017 08:30 4/26/2017 10:43   Uric Acid Latest Ref Range: 2.4 - 5.7 mg/dL 5.3 5.6 7.3 (H)         Medication List with Changes/Refills   Current Medications    ALBUTEROL 90 MCG/ACTUATION INHALER    Inhale 1-2 puffs into the lungs every 6 (six) hours as needed for Wheezing. Rescue    ALPRAZOLAM (XANAX) 0.25 MG TABLET    Take 1 tablet (0.25 mg total) by mouth 2 (two) times daily as needed for Anxiety.    ASCORBIC ACID, VITAMIN C, (VITAMIN C) 100 MG TABLET    Take by mouth once daily.    ASPIRIN (ECOTRIN) 81 MG EC TABLET    Take 81 mg by mouth nightly.     CLOPIDOGREL (PLAVIX) 75 MG TABLET    Take 1 tablet (75 mg total) by mouth once daily.    ERGOCALCIFEROL, VITAMIN D2, 400 UNIT TAB    Take 1 tablet by mouth once daily.     FLUTICASONE (FLONASE) 50 MCG/ACTUATION NASAL SPRAY    2 sprays by Each Nare route 2 (two) times daily as needed for Allergies.     FUROSEMIDE (LASIX) 20 MG TABLET    Take 1 tablet (20 mg total) by mouth once daily.    GABAPENTIN (NEURONTIN) 100 MG CAPSULE    TAKE 1 CAPSULE(100 MG) BY MOUTH THREE TIMES DAILY    INHALATION SPACING DEVICE (RITEFLO AEROCHAMBER)    Use as directed for inhalation.    ISOSORBIDE MONONITRATE (IMDUR) 30 MG 24 HR TABLET    Take 1 tablet (30 mg total) by mouth once daily.    LEVOTHYROXINE (SYNTHROID) 75 MCG TABLET    TAKE 1 TABLET(75 MCG) BY MOUTH BEFORE BREAKFAST    LOSARTAN (COZAAR) 25 MG TABLET    Take 1 tablet (25 mg total) by mouth  once daily.    MELOXICAM (MOBIC) 7.5 MG TABLET    TAKE 1 TABLET BY MOUTH EVERY DAY AS NEEDED FOR PAIN    METOPROLOL TARTRATE (LOPRESSOR) 25 MG TABLET    Take 1 tablet (25 mg total) by mouth 2 (two) times daily.    MIRTAZAPINE (REMERON SOL-TAB) 15 MG DISINTEGRATING TABLET    Take 1 tablet (15 mg total) by mouth nightly.    MULTIVITAMIN (ONE DAILY MULTIVITAMIN) PER TABLET    Take 1 tablet by mouth once daily.    NITROGLYCERIN 400 MCG/SPRAY SPRA    Place 1 spray under the tongue every 5 (five) minutes as needed for Chest pain.     ONDANSETRON (ZOFRAN-ODT) 8 MG TBDL    Take 1 tablet (8 mg total) by mouth every 12 (twelve) hours as needed (Nausea).    PREDNISONE (DELTASONE) 50 MG TAB    One tablet po bid    ROSUVASTATIN (CRESTOR) 10 MG TABLET    Take 1 tablet (10 mg total) by mouth every evening.    SERTRALINE (ZOLOFT) 100 MG TABLET    Take 1 tablet (100 mg total) by mouth once daily.    ZINC ACETATE ORAL    Take 250 mg by mouth once daily.    Changed and/or Refilled Medications    Modified Medication Previous Medication    ALLOPURINOL (ZYLOPRIM) 300 MG TABLET allopurinol (ZYLOPRIM) 100 MG tablet       Take 1 tablet (300 mg total) by mouth once daily.    TAKE 2 TABLETS BY MOUTH DAILY    COLCHICINE (COLCRYS) 0.6 MG TABLET COLCRYS 0.6 mg tablet       Take 1 tablet (0.6 mg total) by mouth once daily.    TAKE 1 TABLET(0.6 MG) BY MOUTH EVERY DAY       Assessment/Plans:-  Primary osteoarthritis involving multiple joints  Recurrence of inflammatory pain after stopping turmeric. I do not see any contraindication for curcumin. I will request her to restart curcumin 2 grams daily if  has no objection.     Idiopathic chronic gout of multiple sites without tophus  Uncontrolled uric acid level.  With a newly diagnosed lymphoma she will have more uric acid burden secondary to chemotherapy which will certainly increase attacks of gout.  Increase allopurinol dose to 300 mg daily.  Check uric acid with every infusion and  titrate dose as needed.  Continue colchicine once daily for gout prophylaxis.  - allopurinol (ZYLOPRIM) 300 MG tablet; Take 1 tablet (300 mg total) by mouth once daily.  Dispense: 90 tablet; Refill: 1  - colchicine (COLCRYS) 0.6 mg tablet; Take 1 tablet (0.6 mg total) by mouth once daily.  Dispense: 90 tablet; Refill: 3  # Return in about 6 months (around 2/16/2018).      Time spent: 30 minutes in face to face discussion concerning diagnosis, prognosis, review of lab and test results, benefits of treatment as well as management of disease, counseling of patient and coordination of care between various health care providers . Greater than half the time spent was used for coordination of care and counseling of patient.      Disclaimer: This note was prepared using voice recognition system and is likely to have sound alike errors and is not proof read.  Please call me with any questions.

## 2017-08-16 NOTE — ASSESSMENT & PLAN NOTE
Recurrence of inflammatory pain after stopping turmeric. I do not see any contraindication for curcumin. I will request her to restart curcumin 2 grams daily if  has no objection.

## 2017-08-16 NOTE — ASSESSMENT & PLAN NOTE
Uncontrolled uric acid level.  With a newly diagnosed lymphoma she will have more uric acid burden secondary to chemotherapy which will certainly increase attacks of gout.  Increase allopurinol dose to 300 mg daily.  Check uric acid with every infusion and titrate dose as needed.  Continue colchicine once daily for gout prophylaxis.

## 2017-08-17 ENCOUNTER — OFFICE VISIT (OUTPATIENT)
Dept: HEMATOLOGY/ONCOLOGY | Facility: CLINIC | Age: 77
End: 2017-08-17
Payer: MEDICARE

## 2017-08-17 ENCOUNTER — TELEPHONE (OUTPATIENT)
Dept: HEMATOLOGY/ONCOLOGY | Facility: CLINIC | Age: 77
End: 2017-08-17

## 2017-08-17 ENCOUNTER — HOSPITAL ENCOUNTER (OUTPATIENT)
Dept: RADIOLOGY | Facility: HOSPITAL | Age: 77
Discharge: HOME | End: 2017-08-17
Attending: INTERNAL MEDICINE
Payer: MEDICARE

## 2017-08-17 VITALS
TEMPERATURE: 97 F | RESPIRATION RATE: 16 BRPM | DIASTOLIC BLOOD PRESSURE: 60 MMHG | WEIGHT: 161.38 LBS | OXYGEN SATURATION: 99 % | BODY MASS INDEX: 27.55 KG/M2 | SYSTOLIC BLOOD PRESSURE: 102 MMHG | HEART RATE: 80 BPM | HEIGHT: 64 IN

## 2017-08-17 DIAGNOSIS — R94.2 DIFFUSION CAPACITY OF LUNG (DL), DECREASED: Chronic | ICD-10-CM

## 2017-08-17 DIAGNOSIS — C85.80 LYMPHOMA MALIGNANT, LARGE CELL: Primary | ICD-10-CM

## 2017-08-17 DIAGNOSIS — C85.83 LARGE CELL LYMPHOMA OF INTRA-ABDOMINAL LYMPH NODES: ICD-10-CM

## 2017-08-17 PROBLEM — I50.9 CHF (CONGESTIVE HEART FAILURE): Status: ACTIVE | Noted: 2017-08-17

## 2017-08-17 PROCEDURE — 99499 UNLISTED E&M SERVICE: CPT | Mod: S$GLB,,, | Performed by: INTERNAL MEDICINE

## 2017-08-17 PROCEDURE — 3074F SYST BP LT 130 MM HG: CPT | Mod: S$GLB,,, | Performed by: INTERNAL MEDICINE

## 2017-08-17 PROCEDURE — 71260 CT THORAX DX C+: CPT | Mod: 26,,, | Performed by: RADIOLOGY

## 2017-08-17 PROCEDURE — 71260 CT THORAX DX C+: CPT | Mod: TC,PO

## 2017-08-17 PROCEDURE — 1159F MED LIST DOCD IN RCRD: CPT | Mod: S$GLB,,, | Performed by: INTERNAL MEDICINE

## 2017-08-17 PROCEDURE — 1126F AMNT PAIN NOTED NONE PRSNT: CPT | Mod: S$GLB,,, | Performed by: INTERNAL MEDICINE

## 2017-08-17 PROCEDURE — 99215 OFFICE O/P EST HI 40 MIN: CPT | Mod: S$GLB,,, | Performed by: INTERNAL MEDICINE

## 2017-08-17 PROCEDURE — 3078F DIAST BP <80 MM HG: CPT | Mod: S$GLB,,, | Performed by: INTERNAL MEDICINE

## 2017-08-17 PROCEDURE — 99999 PR PBB SHADOW E&M-EST. PATIENT-LVL V: CPT | Mod: PBBFAC,,, | Performed by: INTERNAL MEDICINE

## 2017-08-17 PROCEDURE — 3008F BODY MASS INDEX DOCD: CPT | Mod: S$GLB,,, | Performed by: INTERNAL MEDICINE

## 2017-08-17 PROCEDURE — 1157F ADVNC CARE PLAN IN RCRD: CPT | Mod: S$GLB,,, | Performed by: INTERNAL MEDICINE

## 2017-08-17 PROCEDURE — 25500020 PHARM REV CODE 255: Mod: PO | Performed by: INTERNAL MEDICINE

## 2017-08-17 RX ADMIN — IOHEXOL 75 ML: 350 INJECTION, SOLUTION INTRAVENOUS at 10:08

## 2017-08-17 NOTE — PROGRESS NOTES
Subjective:       Patient ID: Violet Swenson is a 77 y.o. female.    Chief Complaint: Lymphoma    HPI This is a 77-year-old  lady who comes for follow up of hr large cell lymphoma.  She  was   admitted to the hospital in early April with diffuse abdominal pain. A CT of   the abdomen done on 04/05/2017 was reported as showing a 6.9 x 7.0 x 8.4 cm soft  tissue mass in the right lower quadrant in the terminal ileum abutting the   cecum. There was adjacent conglomerate adenopathy in the right lower quadrant   mesentery.     The patient had a colonoscopy that showed a lesion in the terminal ileum.     She underwent a right hemicolectomy and terminal ileum removal. 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  She had a Ct/PET that shows evidence of surgery and some non specific findings, but no evidence of activer disease elsewhere.  An ECHO showed normal cardiac ejection fraction, as wella s a MUGA.  She was seen cardiology and cleared for ADRIAMYCIN administration.  She is negative for Hep B/C and HIV  Bone marrow was negative. S    The patient started  R-CHOP. Ttreatmtnt    She tolerated the treatment with some minor difficulties. After 3 cycles, she had a repeat CT/PET  There was no activity in the abdomen.  There was development of ground glass opacities/infiltrates in both lungs which were hypermetabolic although the SUV was not reported.  We discussed the imaging studies with Radiology and Pulmonary.  Dr Corral of the Pulmonary Department favored the findings to be due to pulmonary congestion.  Her troponin is normal, but her BNP is markedly elevated at 1,103.  She was started on Lasix by dr Corral and asked to have a  Ct in few weeks  The patient has had evaluation by Cardiology ( dr Gil). I have discussed the case with him and he feels she has developed CHF., with a decrease in her ejection raction to 47%.  The patient says her leg  edema and SOB have  improved on lasix. She feels fatigued.  She is due for a CT scan of the chest later on today   .   ALLERGIES: Oxycodone. The chart says that she has allergic to steroids, but   says she is not.     MEDICATIONS: See MedCard.     PREVIOUS SURGERIES: Appendectomy in , tonsillectomy at age 19, gastric   bypass with incidental cholecystectomy, bilateral knee replacement in 2009.     SOCIAL HISTORY: She is . She had two natural children, and one adopted   one. The adopted child has . She lives in [place] with her   . She smoked for two years, averaging a pack a day. She stopped 35   years ago or so. Denies any alcohol intake. She worked in [employer].     FAMILY HISTORY: Father  of colon cancer. Younger brother had leukemia that  transform into a lymphoma. Sister had pancreatic cancer.  Review of Systems   Constitutional: Negative.    HENT: Negative.    Eyes: Negative.    Respiratory: Negative.  Negative for cough and wheezing.    Cardiovascular: Negative.  Negative for chest pain.   Gastrointestinal: Negative.    Genitourinary: Negative.    Neurological: Negative.    Psychiatric/Behavioral: Negative.        Objective:      Physical Exam   Constitutional: She is oriented to person, place, and time. She appears well-developed. No distress.   HENT:   Head: Normocephalic.   Right Ear: Tympanic membrane, external ear and ear canal normal.   Left Ear: Tympanic membrane, external ear and ear canal normal.   Nose: Nose normal. Right sinus exhibits no maxillary sinus tenderness and no frontal sinus tenderness. Left sinus exhibits no maxillary sinus tenderness and no frontal sinus tenderness.   Mouth/Throat: Oropharynx is clear and moist and mucous membranes are normal.   Teeth normal.  Gums normal.   Eyes: Conjunctivae and lids are normal. Pupils are equal, round, and reactive to light.   Neck: Normal carotid pulses, no hepatojugular reflux and no JVD present. Carotid bruit is not present. No  tracheal deviation present. No thyroid mass and no thyromegaly present.   Cardiovascular: Normal rate, regular rhythm, S1 normal, S2 normal, normal heart sounds and intact distal pulses.  Exam reveals no gallop and no friction rub.    No murmur heard.  Carotid exam normal   Pulmonary/Chest: Effort normal and breath sounds normal. No accessory muscle usage. No respiratory distress. She has no wheezes. She has no rales. She exhibits no tenderness.   Abdominal: Soft. Normal appearance. She exhibits no distension and no mass. There is no splenomegaly or hepatomegaly. There is no tenderness. There is no rebound and no guarding.   Musculoskeletal: Normal range of motion. She exhibits no edema or tenderness.        Right hand: Normal.        Left hand: Normal.       Lymphadenopathy:     She has no cervical adenopathy.     She has no axillary adenopathy.        Right: No inguinal and no supraclavicular adenopathy present.        Left: No inguinal and no supraclavicular adenopathy present.   Neurological: She is alert and oriented to person, place, and time. She has normal strength. No cranial nerve deficit. Coordination normal.   Skin: Skin is warm and dry. No rash noted. She is not diaphoretic. No cyanosis or erythema. No pallor. Nails show no clubbing.   Psychiatric: She has a normal mood and affect. Her behavior is normal. Judgment and thought content normal.       Wt Readings from Last 3 Encounters:   08/17/17 73.2 kg (161 lb 6 oz)   08/16/17 73.2 kg (161 lb 6 oz)   08/07/17 74.9 kg (165 lb 2 oz)     Temp Readings from Last 3 Encounters:   08/17/17 97 °F (36.1 °C)   08/07/17 98.4 °F (36.9 °C) (Oral)   08/03/17 98.1 °F (36.7 °C) (Oral)     BP Readings from Last 3 Encounters:   08/17/17 102/60   08/16/17 107/68   08/07/17 139/89     Pulse Readings from Last 3 Encounters:   08/17/17 80   08/16/17 90   08/07/17 (!) 57       Assessment:       1. Lymphoma malignant, large cell      2-CHF  Plan:       Lab Results   Component  Value Date    WBC 6.42 08/02/2017    HGB 9.9 (L) 08/02/2017    HCT 31.4 (L) 08/02/2017    MCV 87 08/02/2017     (H) 08/02/2017       The reports from cardiology were personally reviewed. We discussed the case with both DR Gil and Dr Corral of Cardiology and Pulmonary.  I have asked her to see dr Gil for a formal appointment     She was told that although the original plans were for her to receive 6 cycles of R-CHOp, I am reluctant to treat her with additional chemotherapy because of ehr CHF.  We agreed she will have a Ct/PET in 4 weeks along with a cbc and a cmp  Complex visit, 45 minutes spent with patient, discussed with several sub-specialist  90% coordinating care

## 2017-08-17 NOTE — TELEPHONE ENCOUNTER
----- Message from Da Matson MD sent at 8/17/2017  4:01 PM CDT -----  Please let her know the CT scan showed resolution of the infiltrates in the lung  DR MATSON

## 2017-08-18 ENCOUNTER — OFFICE VISIT (OUTPATIENT)
Dept: PULMONOLOGY | Facility: CLINIC | Age: 77
End: 2017-08-18
Payer: MEDICARE

## 2017-08-18 VITALS
DIASTOLIC BLOOD PRESSURE: 82 MMHG | WEIGHT: 154.31 LBS | RESPIRATION RATE: 18 BRPM | BODY MASS INDEX: 26.34 KG/M2 | HEART RATE: 92 BPM | SYSTOLIC BLOOD PRESSURE: 120 MMHG | HEIGHT: 64 IN | OXYGEN SATURATION: 97 %

## 2017-08-18 DIAGNOSIS — I25.5 ISCHEMIC CARDIOMYOPATHY: ICD-10-CM

## 2017-08-18 DIAGNOSIS — R94.2 DIFFUSION CAPACITY OF LUNG (DL), DECREASED: Chronic | ICD-10-CM

## 2017-08-18 DIAGNOSIS — R91.8 GROUND GLASS OPACITY PRESENT ON IMAGING OF LUNG: Primary | ICD-10-CM

## 2017-08-18 DIAGNOSIS — I50.9 CONGESTIVE HEART FAILURE, UNSPECIFIED CONGESTIVE HEART FAILURE CHRONICITY, UNSPECIFIED CONGESTIVE HEART FAILURE TYPE: Primary | ICD-10-CM

## 2017-08-18 PROCEDURE — 3074F SYST BP LT 130 MM HG: CPT | Mod: S$GLB,,, | Performed by: INTERNAL MEDICINE

## 2017-08-18 PROCEDURE — 99999 PR PBB SHADOW E&M-EST. PATIENT-LVL III: CPT | Mod: PBBFAC,,, | Performed by: INTERNAL MEDICINE

## 2017-08-18 PROCEDURE — 1157F ADVNC CARE PLAN IN RCRD: CPT | Mod: S$GLB,,, | Performed by: INTERNAL MEDICINE

## 2017-08-18 PROCEDURE — 99499 UNLISTED E&M SERVICE: CPT | Mod: S$GLB,,, | Performed by: INTERNAL MEDICINE

## 2017-08-18 PROCEDURE — 3079F DIAST BP 80-89 MM HG: CPT | Mod: S$GLB,,, | Performed by: INTERNAL MEDICINE

## 2017-08-18 PROCEDURE — 99214 OFFICE O/P EST MOD 30 MIN: CPT | Mod: S$GLB,,, | Performed by: INTERNAL MEDICINE

## 2017-08-18 PROCEDURE — 1159F MED LIST DOCD IN RCRD: CPT | Mod: S$GLB,,, | Performed by: INTERNAL MEDICINE

## 2017-08-18 PROCEDURE — 3008F BODY MASS INDEX DOCD: CPT | Mod: S$GLB,,, | Performed by: INTERNAL MEDICINE

## 2017-08-18 NOTE — PROGRESS NOTES
"Subjective:       Patient ID: Violet Swenson is a 77 y.o. female.    Chief Complaint: Ground glass opacity present on imaging of lung    Violet Mcnally on is 77 years old  Follow-up appointment  Treated with chemotherapy artichoke  She had a PET scan which was abnormal with groundglass opacities  This is significantly improved after adequate diuresis  BNP has also improved  No cough no wheezing or shortness of breath  BNP decreased from 1103 down to 787  She has seen cardiology  Chest CT scan demonstrated resolution of groundglass opacities  No further Further pulmonary interventions required         Review of Systems   Constitutional: Negative.    HENT: Negative.    Eyes: Negative.    Respiratory: Negative.    Cardiovascular: Negative.    Genitourinary: Negative.    Endocrine: endocrine negative   Musculoskeletal: Negative.    Skin: Negative.    Gastrointestinal: Negative.    Neurological: Negative.    Psychiatric/Behavioral: Negative.        Objective:       Vitals:    08/18/17 1537   BP: 120/82   Pulse: 92   Resp: 18   SpO2: 97%   Weight: 70 kg (154 lb 5.2 oz)   Height: 5' 4" (1.626 m)     Physical Exam   Constitutional: She is oriented to person, place, and time. She appears well-developed and well-nourished.   HENT:   Head: Normocephalic.   Mouth/Throat: Oropharynx is clear and moist.   Neck: Normal range of motion. Neck supple.   Cardiovascular: Normal rate and regular rhythm.    Pulmonary/Chest: Normal expansion and symmetric chest wall expansion.   Abdominal: Soft.   Musculoskeletal: Normal range of motion.   Lymphadenopathy:     She has no cervical adenopathy.   Neurological: She is alert and oriented to person, place, and time.   Skin: Skin is warm and dry.   Psychiatric: She has a normal mood and affect.   Nursing note and vitals reviewed.    Personal Diagnostic Review  CT of chest performed  1. Interval resolution of the previously noted diffuse groundglass opacities.    Vague tiny " micronodular densities noted within the right upper lobe which may be representative of a chronic interstitial process.    These may have even been present on multiple prior exams.  No flowsheet data found.      Assessment:       Problem List Items Addressed This Visit     Diffusion capacity of lung (dl), decreased (Chronic)    Ischemic cardiomyopathy    Ground glass opacity present on imaging of lung - Primary      Other Visit Diagnoses    None.       Plan:         Radiological abnormalities have resolved  No further follow-up    Return if symptoms worsen or fail to improve.    This note was prepared using voice recognition system and is likely to have sound alike errors that may have been overlooked even after proof reading.  Please call me with any questions    Discussed diagnosis, its evaluation, treatment and usual course. All questions answered.    Thank you for the courtesy of participating in the care of this patient    Ross Corral MD

## 2017-08-22 DIAGNOSIS — M15.9 PRIMARY OSTEOARTHRITIS INVOLVING MULTIPLE JOINTS: ICD-10-CM

## 2017-08-22 RX ORDER — MELOXICAM 7.5 MG/1
TABLET ORAL
Qty: 90 TABLET | Refills: 0 | Status: SHIPPED | OUTPATIENT
Start: 2017-08-22 | End: 2017-11-22 | Stop reason: SDUPTHER

## 2017-08-23 ENCOUNTER — PROCEDURE VISIT (OUTPATIENT)
Dept: SURGERY | Facility: CLINIC | Age: 77
End: 2017-08-23
Payer: MEDICARE

## 2017-08-23 VITALS
SYSTOLIC BLOOD PRESSURE: 96 MMHG | WEIGHT: 155.63 LBS | BODY MASS INDEX: 26.72 KG/M2 | TEMPERATURE: 98 F | HEART RATE: 69 BPM | DIASTOLIC BLOOD PRESSURE: 64 MMHG

## 2017-08-23 DIAGNOSIS — R22.2 CHEST WALL MASS: Primary | ICD-10-CM

## 2017-08-23 PROCEDURE — 88305 TISSUE EXAM BY PATHOLOGIST: CPT | Mod: 26,,, | Performed by: PATHOLOGY

## 2017-08-23 PROCEDURE — 88305 TISSUE EXAM BY PATHOLOGIST: CPT | Performed by: PATHOLOGY

## 2017-08-23 PROCEDURE — 21556 EXC NECK TUM DEEP < 5 CM: CPT | Mod: S$GLB,,, | Performed by: SURGERY

## 2017-08-23 PROCEDURE — 87070 CULTURE OTHR SPECIMN AEROBIC: CPT

## 2017-08-23 RX ORDER — HYDROCODONE BITARTRATE AND ACETAMINOPHEN 5; 325 MG/1; MG/1
1 TABLET ORAL EVERY 6 HOURS PRN
Qty: 5 TABLET | Refills: 0 | Status: SHIPPED | OUTPATIENT
Start: 2017-08-23 | End: 2017-09-14

## 2017-08-23 NOTE — PROCEDURES
"Exc, Benign Lesion  Date/Time: 8/23/2017 12:59 PM  Performed by: LAURO RHODES  Authorized by: LAURO RHODES     Consent Done?:  Yes (Written)  Time out: Immediately prior to procedure a "time out" was called to verify the correct patient, procedure, equipment, support staff and site/side marked as required.      STAFF:  Assistants?: No    I was present for the entire procedure.  INDICATIONS:: Chest wall mass.  LOCATION:  Body area:  Chest    PREP:Position:  Supine    ANESTHESIA:  Anesthesia:  Local infiltration  Local anesthetic:  Lidocaine 1% with epinephrine    PROCEDURE DETAILS:  Excision type:  Tumor  Excision depth:  Subfascial  Excision size (cm):  3  Scalpel size:  15  Incision type:  Single straight  Specimens?: Yes    Specimens submitted to pathology.  Hemostasis was obtained.  Estimated blood loss (cc):  3  Wound closure:  Complex layered  Wound repair size (cm):  3  Sutures:  2-0 Vicryl, 3-0 Vicryl and 4-0 Monocryl  Sterile dressings:  Mastisol, Steri-strips, gauze and Tegaderm  Post-op diagnosis: Same as pre-op diagnosis.  Needle, instrument, and sponge counts were correct.  Patient tolerated the procedure well with no immediate complications.  Post-operative instructions were provided for the patient.  Patient was discharged and will follow up for wound check and pathology results.      "

## 2017-08-28 LAB — BACTERIA SPEC AEROBE CULT: NO GROWTH

## 2017-09-01 ENCOUNTER — LAB VISIT (OUTPATIENT)
Dept: LAB | Facility: HOSPITAL | Age: 77
End: 2017-09-01
Attending: INTERNAL MEDICINE
Payer: MEDICARE

## 2017-09-01 ENCOUNTER — CLINICAL SUPPORT (OUTPATIENT)
Dept: CARDIOLOGY | Facility: CLINIC | Age: 77
End: 2017-09-01
Payer: MEDICARE

## 2017-09-01 ENCOUNTER — OFFICE VISIT (OUTPATIENT)
Dept: CARDIOLOGY | Facility: CLINIC | Age: 77
End: 2017-09-01
Payer: MEDICARE

## 2017-09-01 VITALS
BODY MASS INDEX: 27.67 KG/M2 | HEIGHT: 64 IN | SYSTOLIC BLOOD PRESSURE: 122 MMHG | DIASTOLIC BLOOD PRESSURE: 80 MMHG | WEIGHT: 162.06 LBS | HEART RATE: 86 BPM

## 2017-09-01 DIAGNOSIS — N28.9 RENAL INSUFFICIENCY: ICD-10-CM

## 2017-09-01 DIAGNOSIS — E03.9 HYPOTHYROIDISM (ACQUIRED): Chronic | ICD-10-CM

## 2017-09-01 DIAGNOSIS — C85.90 LYMPHOMA, UNSPECIFIED BODY REGION, UNSPECIFIED LYMPHOMA TYPE: ICD-10-CM

## 2017-09-01 DIAGNOSIS — C83.33 DIFFUSE LARGE B-CELL LYMPHOMA OF INTRA-ABDOMINAL LYMPH NODES: ICD-10-CM

## 2017-09-01 DIAGNOSIS — D69.6 THROMBOCYTOPENIA: ICD-10-CM

## 2017-09-01 DIAGNOSIS — I25.5 ISCHEMIC CARDIOMYOPATHY: ICD-10-CM

## 2017-09-01 DIAGNOSIS — R94.2 DIFFUSION CAPACITY OF LUNG (DL), DECREASED: Chronic | ICD-10-CM

## 2017-09-01 DIAGNOSIS — R91.8 GROUND GLASS OPACITY PRESENT ON IMAGING OF LUNG: ICD-10-CM

## 2017-09-01 DIAGNOSIS — I50.9 CONGESTIVE HEART FAILURE, UNSPECIFIED CONGESTIVE HEART FAILURE CHRONICITY, UNSPECIFIED CONGESTIVE HEART FAILURE TYPE: ICD-10-CM

## 2017-09-01 DIAGNOSIS — I10 ESSENTIAL HYPERTENSION: Chronic | ICD-10-CM

## 2017-09-01 DIAGNOSIS — S40.022A TRAUMATIC HEMATOMA OF LEFT UPPER ARM, INITIAL ENCOUNTER: ICD-10-CM

## 2017-09-01 DIAGNOSIS — I25.10 CORONARY ARTERY DISEASE INVOLVING NATIVE CORONARY ARTERY OF NATIVE HEART WITHOUT ANGINA PECTORIS: ICD-10-CM

## 2017-09-01 DIAGNOSIS — Z98.84 S/P GASTRIC BYPASS: ICD-10-CM

## 2017-09-01 DIAGNOSIS — I25.10 CAD, MULTIPLE VESSEL: Chronic | ICD-10-CM

## 2017-09-01 DIAGNOSIS — E78.5 HYPERLIPIDEMIA, UNSPECIFIED HYPERLIPIDEMIA TYPE: Chronic | ICD-10-CM

## 2017-09-01 DIAGNOSIS — Z95.5 S/P CORONARY ARTERY STENT PLACEMENT: ICD-10-CM

## 2017-09-01 DIAGNOSIS — C85.83 LARGE CELL LYMPHOMA OF INTRA-ABDOMINAL LYMPH NODES: ICD-10-CM

## 2017-09-01 DIAGNOSIS — I50.9 CONGESTIVE HEART FAILURE, UNSPECIFIED CONGESTIVE HEART FAILURE CHRONICITY, UNSPECIFIED CONGESTIVE HEART FAILURE TYPE: Primary | ICD-10-CM

## 2017-09-01 DIAGNOSIS — Z95.0 PACEMAKER: ICD-10-CM

## 2017-09-01 LAB
ANION GAP SERPL CALC-SCNC: 6 MMOL/L
BASOPHILS # BLD AUTO: 0.04 K/UL
BASOPHILS NFR BLD: 0.5 %
BUN SERPL-MCNC: 20 MG/DL
CALCIUM SERPL-MCNC: 8.8 MG/DL
CHLORIDE SERPL-SCNC: 113 MMOL/L
CO2 SERPL-SCNC: 22 MMOL/L
CREAT SERPL-MCNC: 0.9 MG/DL
DIFFERENTIAL METHOD: ABNORMAL
EOSINOPHIL # BLD AUTO: 0.4 K/UL
EOSINOPHIL NFR BLD: 4.7 %
ERYTHROCYTE [DISTWIDTH] IN BLOOD BY AUTOMATED COUNT: 24.2 %
EST. GFR  (AFRICAN AMERICAN): >60 ML/MIN/1.73 M^2
EST. GFR  (NON AFRICAN AMERICAN): >60 ML/MIN/1.73 M^2
GLUCOSE SERPL-MCNC: 79 MG/DL
HCT VFR BLD AUTO: 34.3 %
HGB BLD-MCNC: 10.7 G/DL
LYMPHOCYTES # BLD AUTO: 2.5 K/UL
LYMPHOCYTES NFR BLD: 33 %
MCH RBC QN AUTO: 30.1 PG
MCHC RBC AUTO-ENTMCNC: 31.2 G/DL
MCV RBC AUTO: 97 FL
MONOCYTES # BLD AUTO: 0.8 K/UL
MONOCYTES NFR BLD: 10.9 %
NEUTROPHILS # BLD AUTO: 3.9 K/UL
NEUTROPHILS NFR BLD: 50.9 %
PLATELET # BLD AUTO: 266 K/UL
PMV BLD AUTO: 9.9 FL
POTASSIUM SERPL-SCNC: 5.1 MMOL/L
RBC # BLD AUTO: 3.55 M/UL
SODIUM SERPL-SCNC: 141 MMOL/L
WBC # BLD AUTO: 7.58 K/UL

## 2017-09-01 PROCEDURE — 1159F MED LIST DOCD IN RCRD: CPT | Mod: S$GLB,,, | Performed by: INTERNAL MEDICINE

## 2017-09-01 PROCEDURE — 85025 COMPLETE CBC W/AUTO DIFF WBC: CPT | Mod: PO

## 2017-09-01 PROCEDURE — 1126F AMNT PAIN NOTED NONE PRSNT: CPT | Mod: S$GLB,,, | Performed by: INTERNAL MEDICINE

## 2017-09-01 PROCEDURE — 3008F BODY MASS INDEX DOCD: CPT | Mod: S$GLB,,, | Performed by: INTERNAL MEDICINE

## 2017-09-01 PROCEDURE — 36415 COLL VENOUS BLD VENIPUNCTURE: CPT | Mod: PO

## 2017-09-01 PROCEDURE — 99499 UNLISTED E&M SERVICE: CPT | Mod: S$GLB,,, | Performed by: INTERNAL MEDICINE

## 2017-09-01 PROCEDURE — 3079F DIAST BP 80-89 MM HG: CPT | Mod: S$GLB,,, | Performed by: INTERNAL MEDICINE

## 2017-09-01 PROCEDURE — 1157F ADVNC CARE PLAN IN RCRD: CPT | Mod: S$GLB,,, | Performed by: INTERNAL MEDICINE

## 2017-09-01 PROCEDURE — 93000 ELECTROCARDIOGRAM COMPLETE: CPT | Mod: S$GLB,,, | Performed by: NUCLEAR MEDICINE

## 2017-09-01 PROCEDURE — 3074F SYST BP LT 130 MM HG: CPT | Mod: S$GLB,,, | Performed by: INTERNAL MEDICINE

## 2017-09-01 PROCEDURE — 80048 BASIC METABOLIC PNL TOTAL CA: CPT | Mod: PO

## 2017-09-01 PROCEDURE — 99999 PR PBB SHADOW E&M-EST. PATIENT-LVL III: CPT | Mod: PBBFAC,,, | Performed by: INTERNAL MEDICINE

## 2017-09-01 PROCEDURE — 99214 OFFICE O/P EST MOD 30 MIN: CPT | Mod: S$GLB,,, | Performed by: INTERNAL MEDICINE

## 2017-09-01 RX ORDER — FUROSEMIDE 20 MG/1
20 TABLET ORAL DAILY
Qty: 30 TABLET | Refills: 11 | Status: CANCELLED | OUTPATIENT
Start: 2017-09-01

## 2017-09-01 RX ORDER — FUROSEMIDE 20 MG/1
20 TABLET ORAL DAILY
Qty: 30 TABLET | Refills: 11 | Status: SHIPPED | OUTPATIENT
Start: 2017-09-01 | End: 2017-09-28 | Stop reason: SDUPTHER

## 2017-09-01 NOTE — PROGRESS NOTES
Subjective:   Patient ID:  Violet Swenson is a 77 y.o. female who presents for follow up of Congestive Heart Failure and Shortness of Breath      HPI  A 78 yo female with h/o lymphoma s/p chemotherapy with chf and drop in ef with h/o cad s/p lcx stent with negative cardiolite comes back for f/u she ahs been compliant with diet she is still short of breath  She is limited with her  activity. She goes to the doctor or grocery shopping she has no energy she has difficulty cooking w/o getting short of breath she uses a walker she can barely walk 100 feet w/o getting short of breath she is taking he rmeds regularily she is compliant with salt she is on b blockers nitrates arb and diuretics. No dizziness   Past Medical History:   Diagnosis Date    Arthritis     Cancer     lymphoma    Coronary artery disease     01/2015 LHC patent LCX. 50% stenosis in LAD and RCA.      Depression     Encounter for blood transfusion     GERD (gastroesophageal reflux disease)     Gout, arthritis     Hx of psychiatric care     Hyperlipidemia     Hypertension     Hypothyroidism     Lung nodule 2014    RML--stable    Pacemaker     Metronic    Pneumonia     Psychiatric problem        Past Surgical History:   Procedure Laterality Date    APPENDECTOMY      CARDIAC PACEMAKER PLACEMENT  01/22/2015    CHOLECYSTECTOMY      COLONOSCOPY N/A 4/6/2017    Procedure: COLONOSCOPY;  Surgeon: Tye Enamorado MD;  Location[facility];  Service: Endoscopy;  Laterality: N/A;    CORONARY ANGIOPLASTY  02/2014    CORONARY STENT PLACEMENT  02/05/2014    GASTRIC BYPASS      HEMICOLECTOMY Right     HERNIA REPAIR      TONSILLECTOMY      TOTAL KNEE ARTHROPLASTY Bilateral        Social History   Substance Use Topics    Smoking status: Former Smoker    Smokeless tobacco: Never Used    Alcohol use No       Family History   Problem Relation Age of Onset    Heart disease Mother     Hypertension Mother     Prostate cancer Mother      "Stomach cancer Father      "ulcers that turned to cancer"    Pancreatic cancer Sister     Leukemia Brother      "leukemia which led to intestinal cancer"    Drug abuse Son     COPD Daughter        Current Outpatient Prescriptions   Medication Sig    albuterol 90 mcg/actuation inhaler Inhale 1-2 puffs into the lungs every 6 (six) hours as needed for Wheezing. Rescue    allopurinol (ZYLOPRIM) 300 MG tablet Take 1 tablet (300 mg total) by mouth once daily.    alprazolam (XANAX) 0.25 MG tablet Take 1 tablet (0.25 mg total) by mouth 2 (two) times daily as needed for Anxiety.    ascorbic acid, vitamin C, (VITAMIN C) 100 MG tablet Take by mouth once daily.    aspirin (ECOTRIN) 81 MG EC tablet Take 81 mg by mouth nightly.     clopidogrel (PLAVIX) 75 mg tablet Take 1 tablet (75 mg total) by mouth once daily. (Patient taking differently: Take 75 mg by mouth nightly. )    colchicine (COLCRYS) 0.6 mg tablet Take 1 tablet (0.6 mg total) by mouth once daily.    ergocalciferol, vitamin D2, 400 unit Tab Take 1 tablet by mouth once daily.     fluticasone (FLONASE) 50 mcg/actuation nasal spray 2 sprays by Each Nare route 2 (two) times daily as needed for Allergies.     furosemide (LASIX) 20 MG tablet Take 1 tablet (20 mg total) by mouth once daily.    gabapentin (NEURONTIN) 100 MG capsule TAKE 1 CAPSULE(100 MG) BY MOUTH THREE TIMES DAILY    hydrocodone-acetaminophen 5-325mg (NORCO) 5-325 mg per tablet Take 1 tablet by mouth every 6 (six) hours as needed for Pain.    inhalation spacing device (RITEFLO AEROCHAMBER) Use as directed for inhalation.    isosorbide mononitrate (IMDUR) 30 MG 24 hr tablet Take 1 tablet (30 mg total) by mouth once daily. (Patient taking differently: Take 30 mg by mouth nightly. )    levothyroxine (SYNTHROID) 75 MCG tablet TAKE 1 TABLET(75 MCG) BY MOUTH BEFORE BREAKFAST    losartan (COZAAR) 25 MG tablet Take 1 tablet (25 mg total) by mouth once daily.    meloxicam (MOBIC) 7.5 MG tablet TAKE " 1 TABLET BY MOUTH EVERY DAY AS NEEDED FOR PAIN    metoprolol tartrate (LOPRESSOR) 25 MG tablet Take 1 tablet (25 mg total) by mouth 2 (two) times daily.    mirtazapine (REMERON SOL-TAB) 15 MG disintegrating tablet Take 1 tablet (15 mg total) by mouth nightly.    multivitamin (ONE DAILY MULTIVITAMIN) per tablet Take 1 tablet by mouth once daily.    nitroglycerin 400 mcg/spray SprA Place 1 spray under the tongue every 5 (five) minutes as needed for Chest pain.     ondansetron (ZOFRAN-ODT) 8 MG TbDL Take 1 tablet (8 mg total) by mouth every 12 (twelve) hours as needed (Nausea).    predniSONE (DELTASONE) 50 MG Tab One tablet po bid (Patient taking differently: Take 50 mg by mouth 2 (two) times daily. One tablet po bid for 5 days starting on day 1 of each [facility] chemotherapy cycle)    rosuvastatin (CRESTOR) 10 MG tablet Take 1 tablet (10 mg total) by mouth every evening.    sertraline (ZOLOFT) 100 MG tablet Take 1 tablet (100 mg total) by mouth once daily.    ZINC ACETATE ORAL Take 250 mg by mouth once daily.      Current Facility-Administered Medications   Medication    metoprolol tartrate (LOPRESSOR) tablet 25 mg     Current Outpatient Prescriptions on File Prior to Visit   Medication Sig    albuterol 90 mcg/actuation inhaler Inhale 1-2 puffs into the lungs every 6 (six) hours as needed for Wheezing. Rescue    allopurinol (ZYLOPRIM) 300 MG tablet Take 1 tablet (300 mg total) by mouth once daily.    alprazolam (XANAX) 0.25 MG tablet Take 1 tablet (0.25 mg total) by mouth 2 (two) times daily as needed for Anxiety.    ascorbic acid, vitamin C, (VITAMIN C) 100 MG tablet Take by mouth once daily.    aspirin (ECOTRIN) 81 MG EC tablet Take 81 mg by mouth nightly.     clopidogrel (PLAVIX) 75 mg tablet Take 1 tablet (75 mg total) by mouth once daily. (Patient taking differently: Take 75 mg by mouth nightly. )    colchicine (COLCRYS) 0.6 mg tablet Take 1 tablet (0.6 mg total) by mouth once daily.     ergocalciferol, vitamin D2, 400 unit Tab Take 1 tablet by mouth once daily.     fluticasone (FLONASE) 50 mcg/actuation nasal spray 2 sprays by Each Nare route 2 (two) times daily as needed for Allergies.     furosemide (LASIX) 20 MG tablet Take 1 tablet (20 mg total) by mouth once daily.    gabapentin (NEURONTIN) 100 MG capsule TAKE 1 CAPSULE(100 MG) BY MOUTH THREE TIMES DAILY    hydrocodone-acetaminophen 5-325mg (NORCO) 5-325 mg per tablet Take 1 tablet by mouth every 6 (six) hours as needed for Pain.    inhalation spacing device (RITEFLO AEROCHAMBER) Use as directed for inhalation.    isosorbide mononitrate (IMDUR) 30 MG 24 hr tablet Take 1 tablet (30 mg total) by mouth once daily. (Patient taking differently: Take 30 mg by mouth nightly. )    levothyroxine (SYNTHROID) 75 MCG tablet TAKE 1 TABLET(75 MCG) BY MOUTH BEFORE BREAKFAST    losartan (COZAAR) 25 MG tablet Take 1 tablet (25 mg total) by mouth once daily.    meloxicam (MOBIC) 7.5 MG tablet TAKE 1 TABLET BY MOUTH EVERY DAY AS NEEDED FOR PAIN    metoprolol tartrate (LOPRESSOR) 25 MG tablet Take 1 tablet (25 mg total) by mouth 2 (two) times daily.    mirtazapine (REMERON SOL-TAB) 15 MG disintegrating tablet Take 1 tablet (15 mg total) by mouth nightly.    multivitamin (ONE DAILY MULTIVITAMIN) per tablet Take 1 tablet by mouth once daily.    nitroglycerin 400 mcg/spray SprA Place 1 spray under the tongue every 5 (five) minutes as needed for Chest pain.     ondansetron (ZOFRAN-ODT) 8 MG TbDL Take 1 tablet (8 mg total) by mouth every 12 (twelve) hours as needed (Nausea).    predniSONE (DELTASONE) 50 MG Tab One tablet po bid (Patient taking differently: Take 50 mg by mouth 2 (two) times daily. One tablet po bid for 5 days starting on day 1 of each [facility] chemotherapy cycle)    rosuvastatin (CRESTOR) 10 MG tablet Take 1 tablet (10 mg total) by mouth every evening.    sertraline (ZOLOFT) 100 MG tablet Take 1 tablet (100 mg total) by mouth once daily.  "   ZINC ACETATE ORAL Take 250 mg by mouth once daily.      Current Facility-Administered Medications on File Prior to Visit   Medication    metoprolol tartrate (LOPRESSOR) tablet 25 mg       ROS  Constitution: Negative for diaphoresis no weight gain , HAS weakness, malaise/fatigue   HENT: Negative for headaches and hoarse voice.    Eyes: Negative for double vision and visual disturbance.   Cardiovascular: Positive for chest pain RESOLVED, dyspnea on exertion STILL PRESENT  and leg swelling RESOLVED. Negative for claudication, cyanosis, irregular heartbeat, near-syncope, orthopnea, palpitations, paroxysmal nocturnal dyspnea and syncope.   Respiratory: Positive for shortness of breath. Negative for cough, hemoptysis and snoring.    Endocrine:        Hair loss   Hematologic/Lymphatic: Negative for bleeding problem. Does not bruise/bleed easily.   Skin: Negative for color change and poor wound healing.   Musculoskeletal: Negative for muscle cramps, muscle weakness and myalgias.   Gastrointestinal: Negative for bloating, abdominal pain, change in bowel habit, diarrhea, heartburn, hematemesis, hematochezia, melena and nausea.   Neurological: Negative for excessive daytime sleepiness, dizziness, light-headedness, loss of balance and numbness.   Psychiatric/Behavioral: Negative for memory loss. The patient does not have insomnia.    Allergic/Immunologic: Negative for hives.   Objective:   Physical Exam  Vitals:    09/01/17 0816   BP: 122/80   Pulse: 86   Weight: 73.5 kg (162 lb 0.6 oz)   Height: 5' 4" (1.626 m)   Constitutional: She is oriented to person, place, and time. She appears well-developed and well-nourished. She does not appear ill. No distress.   HENT:   Head: Normocephalic and atraumatic.   Eyes: EOM are normal. Pupils are equal, round, and reactive to light. No scleral icterus.   Neck: Normal range of motion. Neck supple. Normal carotid pulses, no hepatojugular reflux and no JVD present. Carotid bruit is not " present. No tracheal deviation present. No thyromegaly present.   Cardiovascular: Normal rate, regular rhythm, normal heart sounds, intact distal pulses and normal pulses.  Exam reveals no gallop and no friction rub.    No murmur heard.  SHE AHS A WEAK LEFT RADIAL PULSE COMPARED TO THE RT .  Pulmonary/Chest: Effort normal and breath sounds normal. No respiratory distress. She has no wheezes. She has no rhonchi. She has no rales. She exhibits no tenderness.   Pacer site well healed  has rt tender mobile mass on the rt side of sternum around 2x3 cm tender. RESOLVED SCAR WELL HEALED.  Abdominal: Soft. Normal appearance, normal aorta and bowel sounds are normal. She exhibits no distension, no abdominal bruit, no ascites and no pulsatile midline mass. There is no hepatomegaly. There is no tenderness.   Musculoskeletal: She exhibits trace  edema.    Varicose veins in legs noted.   Neurological: She is alert and oriented to person, place, and time. She has normal strength. No cranial nerve deficit. Coordination normal.   Skin: Skin is warm and dry. No rash noted. She is not diaphoretic. No cyanosis or erythema. Nails show no clubbing.   Psychiatric: She has a normal mood and affect. Her speech is normal and behavior is normal.   Nursing note and vitals reviewed  Lab Results   Component Value Date    CHOL 138 04/05/2017    CHOL 114 (L) 11/07/2016    CHOL 107 (L) 10/03/2016     Lab Results   Component Value Date    HDL 58 04/05/2017    HDL 51 11/07/2016    HDL 52 10/03/2016     Lab Results   Component Value Date    LDLCALC 62.8 (L) 04/05/2017    LDLCALC 41.8 (L) 11/07/2016    LDLCALC 38.6 (L) 10/03/2016     Lab Results   Component Value Date    TRIG 86 04/05/2017    TRIG 106 11/07/2016    TRIG 82 10/03/2016     Lab Results   Component Value Date    CHOLHDL 42.0 04/05/2017    CHOLHDL 44.7 11/07/2016    CHOLHDL 48.6 10/03/2016       Chemistry        Component Value Date/Time     08/23/2017 1126    K 5.0 08/23/2017 1126      (H) 08/23/2017 1126    CO2 21 (L) 08/23/2017 1126    BUN 20 08/23/2017 1126    CREATININE 0.9 08/23/2017 1126    GLU 73 08/23/2017 1126        Component Value Date/Time    CALCIUM 8.6 (L) 08/23/2017 1126    ALKPHOS 93 08/02/2017 0858    AST 20 08/02/2017 0858    ALT 19 08/02/2017 0858    BILITOT 0.3 08/02/2017 0858    ESTGFRAFRICA >60.0 08/23/2017 1126    EGFRNONAA >60.0 08/23/2017 1126          Lab Results   Component Value Date    TSH 2.702 04/06/2017     Lab Results   Component Value Date    INR 1.0 04/05/2017     Lab Results   Component Value Date    WBC 6.42 08/02/2017    HGB 9.9 (L) 08/02/2017    HCT 31.4 (L) 08/02/2017    MCV 87 08/02/2017     (H) 08/02/2017     BMP  Sodium   Date Value Ref Range Status   08/23/2017 141 136 - 145 mmol/L Final     Potassium   Date Value Ref Range Status   08/23/2017 5.0 3.5 - 5.1 mmol/L Final     Chloride   Date Value Ref Range Status   08/23/2017 114 (H) 95 - 110 mmol/L Final     CO2   Date Value Ref Range Status   08/23/2017 21 (L) 23 - 29 mmol/L Final     BUN, Bld   Date Value Ref Range Status   08/23/2017 20 8 - 23 mg/dL Final     Creatinine   Date Value Ref Range Status   08/23/2017 0.9 0.5 - 1.4 mg/dL Final     Calcium   Date Value Ref Range Status   08/23/2017 8.6 (L) 8.7 - 10.5 mg/dL Final     Anion Gap   Date Value Ref Range Status   08/23/2017 6 (L) 8 - 16 mmol/L Final     eGFR if    Date Value Ref Range Status   08/23/2017 >60.0 >60 mL/min/1.73 m^2 Final     eGFR if non    Date Value Ref Range Status   08/23/2017 >60.0 >60 mL/min/1.73 m^2 Final     Comment:     Calculation used to obtain the estimated glomerular filtration  rate (eGFR) is the CKD-EPI equation. Since race is unknown   in our information system, the eGFR values for   -American and Non--American patients are given   for each creatinine result.       CrCl cannot be calculated (Patient's most recent lab result is older than the maximum 7 days  allowed.).    Assessment:     1. Ischemic cardiomyopathy    2. Essential hypertension    3. Hyperlipidemia, unspecified hyperlipidemia type    4. Hypothyroidism (acquired)    5. CAD, multiple vessel    6. S/P coronary artery stent placement    7. Pacemaker    8. Renal insufficiency    9. Coronary artery disease involving native coronary artery of native heart without angina pectoris    10. Diffuse large B-cell lymphoma of intra-abdominal lymph nodes    11. S/P gastric bypass    12. Lymphoma, unspecified body region, unspecified lymphoma type    13. Thrombocytopenia    14. Traumatic hematoma of left upper arm, initial encounter    15. Congestive heart failure, unspecified congestive heart failure chronicity, unspecified congestive heart failure type    16. 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    17. Ground glass opacity present on imaging of lung      THE PATIENT IS WELL COMPENSATED SHE HAS NO CHF CLINICALLY BY EXAM HER NECK VEINS ARE FLAT HER EDEMA RESOLVED CT SCAN NORMALIZED SHE CONTINUES TO HAVE WEAKNESS FATIGUE DECREASE IN EXERCISE TOLERANCE DESPITE A NEGATIVE CARDIOLITE. I THINK WE NEED TO R/O ANEMIA AS A CAUSE OF HER SYMPTOMS AND IF NOT THEN SHE NEEDS A R/LHC PERFORMED.    Plan:   STAT CBC BMP   IF ANEMIC WILL GET dr carrasco to address   If not rt/lhc  I have explained the risks, benefits , and alternatives of the procedure in detail.the patient voices understanding and all questions have been answered.the patient agrees to proceed as planned.

## 2017-09-06 ENCOUNTER — SURGERY (OUTPATIENT)
Age: 77
End: 2017-09-06

## 2017-09-06 ENCOUNTER — OFFICE VISIT (OUTPATIENT)
Dept: SURGERY | Facility: CLINIC | Age: 77
End: 2017-09-06
Payer: MEDICARE

## 2017-09-06 ENCOUNTER — HOSPITAL ENCOUNTER (OUTPATIENT)
Facility: HOSPITAL | Age: 77
Discharge: HOME | End: 2017-09-07
Attending: INTERNAL MEDICINE | Admitting: INTERNAL MEDICINE
Payer: MEDICARE

## 2017-09-06 VITALS
SYSTOLIC BLOOD PRESSURE: 90 MMHG | TEMPERATURE: 98 F | DIASTOLIC BLOOD PRESSURE: 60 MMHG | HEART RATE: 68 BPM | WEIGHT: 162 LBS | BODY MASS INDEX: 27.81 KG/M2

## 2017-09-06 DIAGNOSIS — C83.33 DIFFUSE LARGE B-CELL LYMPHOMA OF INTRA-ABDOMINAL LYMPH NODES: Primary | ICD-10-CM

## 2017-09-06 DIAGNOSIS — I50.9 CHF (CONGESTIVE HEART FAILURE): ICD-10-CM

## 2017-09-06 DIAGNOSIS — R06.02 SHORTNESS OF BREATH: ICD-10-CM

## 2017-09-06 DIAGNOSIS — I25.10 CAD, MULTIPLE VESSEL: Primary | Chronic | ICD-10-CM

## 2017-09-06 DIAGNOSIS — I25.10 CORONARY ARTERY DISEASE, ANGINA PRESENCE UNSPECIFIED, UNSPECIFIED VESSEL OR LESION TYPE, UNSPECIFIED WHETHER NATIVE OR TRANSPLANTED HEART: ICD-10-CM

## 2017-09-06 PROCEDURE — 25000003 PHARM REV CODE 250

## 2017-09-06 PROCEDURE — 99024 POSTOP FOLLOW-UP VISIT: CPT | Mod: S$GLB,,, | Performed by: SURGERY

## 2017-09-06 PROCEDURE — 63600175 PHARM REV CODE 636 W HCPCS

## 2017-09-06 PROCEDURE — 99999 PR PBB SHADOW E&M-EST. PATIENT-LVL III: CPT | Mod: PBBFAC,,, | Performed by: SURGERY

## 2017-09-06 PROCEDURE — 99499 UNLISTED E&M SERVICE: CPT | Mod: S$GLB,,, | Performed by: SURGERY

## 2017-09-06 PROCEDURE — 99152 MOD SED SAME PHYS/QHP 5/>YRS: CPT | Mod: ,,, | Performed by: INTERNAL MEDICINE

## 2017-09-06 PROCEDURE — 93460 R&L HRT ART/VENTRICLE ANGIO: CPT

## 2017-09-06 PROCEDURE — 93460 R&L HRT ART/VENTRICLE ANGIO: CPT | Mod: 26,,, | Performed by: INTERNAL MEDICINE

## 2017-09-06 PROCEDURE — 25000003 PHARM REV CODE 250: Performed by: INTERNAL MEDICINE

## 2017-09-06 RX ORDER — ASCORBIC ACID 500 MG
500 TABLET ORAL DAILY
Status: DISCONTINUED | OUTPATIENT
Start: 2017-09-07 | End: 2017-09-07 | Stop reason: HOSPADM

## 2017-09-06 RX ORDER — CLOPIDOGREL BISULFATE 75 MG/1
75 TABLET ORAL DAILY
Status: DISCONTINUED | OUTPATIENT
Start: 2017-09-07 | End: 2017-09-07 | Stop reason: HOSPADM

## 2017-09-06 RX ORDER — SERTRALINE HYDROCHLORIDE 50 MG/1
100 TABLET, FILM COATED ORAL DAILY
Status: DISCONTINUED | OUTPATIENT
Start: 2017-09-07 | End: 2017-09-07 | Stop reason: HOSPADM

## 2017-09-06 RX ORDER — FUROSEMIDE 20 MG/1
20 TABLET ORAL DAILY
Status: DISCONTINUED | OUTPATIENT
Start: 2017-09-07 | End: 2017-09-07 | Stop reason: HOSPADM

## 2017-09-06 RX ORDER — DIAZEPAM 5 MG/1
5 TABLET ORAL
Status: DISCONTINUED | OUTPATIENT
Start: 2017-09-06 | End: 2017-09-06

## 2017-09-06 RX ORDER — COLCHICINE 0.6 MG/1
0.6 TABLET, FILM COATED ORAL DAILY
Status: DISCONTINUED | OUTPATIENT
Start: 2017-09-07 | End: 2017-09-07 | Stop reason: HOSPADM

## 2017-09-06 RX ORDER — MIRTAZAPINE 15 MG/1
15 TABLET, FILM COATED ORAL NIGHTLY
Status: DISCONTINUED | OUTPATIENT
Start: 2017-09-06 | End: 2017-09-07 | Stop reason: HOSPADM

## 2017-09-06 RX ORDER — ALBUTEROL SULFATE 2.5 MG/.5ML
2.5 SOLUTION RESPIRATORY (INHALATION) EVERY 6 HOURS PRN
Status: DISCONTINUED | OUTPATIENT
Start: 2017-09-06 | End: 2017-09-07 | Stop reason: HOSPADM

## 2017-09-06 RX ORDER — ALLOPURINOL 300 MG/1
300 TABLET ORAL DAILY
Status: DISCONTINUED | OUTPATIENT
Start: 2017-09-07 | End: 2017-09-07 | Stop reason: HOSPADM

## 2017-09-06 RX ORDER — LOSARTAN POTASSIUM 25 MG/1
25 TABLET ORAL DAILY
Status: DISCONTINUED | OUTPATIENT
Start: 2017-09-07 | End: 2017-09-07 | Stop reason: HOSPADM

## 2017-09-06 RX ORDER — NAPROXEN SODIUM 220 MG/1
81 TABLET, FILM COATED ORAL ONCE
Status: COMPLETED | OUTPATIENT
Start: 2017-09-06 | End: 2017-09-06

## 2017-09-06 RX ORDER — SODIUM CHLORIDE 9 MG/ML
INJECTION, SOLUTION INTRAVENOUS CONTINUOUS
Status: DISCONTINUED | OUTPATIENT
Start: 2017-09-06 | End: 2017-09-06

## 2017-09-06 RX ORDER — ALPRAZOLAM 0.25 MG/1
0.25 TABLET ORAL 2 TIMES DAILY PRN
Status: DISCONTINUED | OUTPATIENT
Start: 2017-09-06 | End: 2017-09-07 | Stop reason: HOSPADM

## 2017-09-06 RX ORDER — DIPHENHYDRAMINE HCL 50 MG
50 CAPSULE ORAL ONCE
Status: COMPLETED | OUTPATIENT
Start: 2017-09-06 | End: 2017-09-06

## 2017-09-06 RX ORDER — ASPIRIN 81 MG/1
81 TABLET ORAL NIGHTLY
Status: DISCONTINUED | OUTPATIENT
Start: 2017-09-07 | End: 2017-09-07 | Stop reason: HOSPADM

## 2017-09-06 RX ORDER — SODIUM CHLORIDE 9 MG/ML
INJECTION, SOLUTION INTRAVENOUS CONTINUOUS
Status: ACTIVE | OUTPATIENT
Start: 2017-09-06 | End: 2017-09-06

## 2017-09-06 RX ORDER — METOPROLOL TARTRATE 25 MG/1
25 TABLET, FILM COATED ORAL 2 TIMES DAILY
Status: DISCONTINUED | OUTPATIENT
Start: 2017-09-06 | End: 2017-09-07 | Stop reason: HOSPADM

## 2017-09-06 RX ORDER — LEVOTHYROXINE SODIUM 75 UG/1
75 TABLET ORAL
Status: DISCONTINUED | OUTPATIENT
Start: 2017-09-07 | End: 2017-09-07 | Stop reason: HOSPADM

## 2017-09-06 RX ORDER — ISOSORBIDE MONONITRATE 30 MG/1
30 TABLET, EXTENDED RELEASE ORAL DAILY
Status: DISCONTINUED | OUTPATIENT
Start: 2017-09-07 | End: 2017-09-07 | Stop reason: HOSPADM

## 2017-09-06 RX ORDER — ONDANSETRON 8 MG/1
8 TABLET, ORALLY DISINTEGRATING ORAL EVERY 12 HOURS PRN
Status: DISCONTINUED | OUTPATIENT
Start: 2017-09-06 | End: 2017-09-07 | Stop reason: HOSPADM

## 2017-09-06 RX ORDER — GABAPENTIN 100 MG/1
100 CAPSULE ORAL 3 TIMES DAILY
Status: DISCONTINUED | OUTPATIENT
Start: 2017-09-06 | End: 2017-09-07 | Stop reason: HOSPADM

## 2017-09-06 RX ORDER — ROSUVASTATIN CALCIUM 10 MG/1
10 TABLET, COATED ORAL NIGHTLY
Status: DISCONTINUED | OUTPATIENT
Start: 2017-09-06 | End: 2017-09-07 | Stop reason: HOSPADM

## 2017-09-06 RX ORDER — SODIUM CHLORIDE 450 MG/100ML
INJECTION, SOLUTION INTRAVENOUS CONTINUOUS
Status: DISCONTINUED | OUTPATIENT
Start: 2017-09-06 | End: 2017-09-06

## 2017-09-06 RX ADMIN — GABAPENTIN 100 MG: 100 CAPSULE ORAL at 09:09

## 2017-09-06 RX ADMIN — SODIUM CHLORIDE: 9 INJECTION, SOLUTION INTRAVENOUS at 12:09

## 2017-09-06 RX ADMIN — SODIUM CHLORIDE: 9 INJECTION, SOLUTION INTRAVENOUS at 05:09

## 2017-09-06 RX ADMIN — Medication 50 MG: at 12:09

## 2017-09-06 RX ADMIN — MIRTAZAPINE 15 MG: 15 TABLET, FILM COATED ORAL at 09:09

## 2017-09-06 RX ADMIN — NAPROXEN SODIUM 81 MG: 220 TABLET, FILM COATED ORAL at 12:09

## 2017-09-06 RX ADMIN — METOPROLOL TARTRATE 25 MG: 25 TABLET ORAL at 09:09

## 2017-09-06 RX ADMIN — DIAZEPAM 5 MG: 5 TABLET ORAL at 12:09

## 2017-09-06 RX ADMIN — ROSUVASTATIN CALCIUM 10 MG: 10 TABLET, FILM COATED ORAL at 09:09

## 2017-09-06 NOTE — OP NOTE
INPATIENT Operative Note         SUMMARY     Surgery Date: 9/6/2017     Surgeon(s) and Role:     * Nader Gil MD - Primary    ASSISTANT:John Hedrick    Pre-op Diagnosis:  Coronary artery disease, angina presence unspecified, unspecified vessel or lesion type, unspecified whether native or transplanted heart [I25.10]  SOB (shortness of breath) [R06.02]      Post-op Diagnosis: cad/chf    Procedure(s) (LRB):  HEART CATH-BILATERAL (N/A)    COMPLICATION:none    Anesthesia: RN IV Sedation    Findings/Key Components:  Patent lcx stent   Non obs lad and rca  Normal filling pressures   Ef 50%     Estimated Blood Loss: < 50 ML.         SPECIMEN: NONE    Devices/Prostetics: None    PLAN:  Continue medical therapy.

## 2017-09-06 NOTE — NURSING TRANSFER
Nursing Transfer Note      9/6/2017     Transfer to [facility] from [facility]     Transfer via stretcher    Transfer with telemetry and chart    Transported by TASHA Reynolds RN and ANITA Marcum RN  Medicines sent:  no  Chart send with patient: Yes  Notified: IMELDA Chakraborty  Patient reassessed at: 1830  Upon arrival to floor: TRANSFERRED TO BED. TELEMETRY MONITER ON.  PROCEDURAL ACCESS SITE WITHOUT BLEEDING OR HEMATOMA. DRESSING DRY AND INTACT.   CALL BELL WITHIN REACH, SIDE RAILS UP X 2, BED LOWEST POSITION.  PATIENT STABLE,  WITHOUT COMPLAINTS.

## 2017-09-06 NOTE — PROGRESS NOTES
"Subjective:       Patient ID: Violet Swenson is a 77 y.o. female.    Chief Complaint: Post-op Evaluation    HPI   S/p right hemicolectomy 4/7/17 status post port placement in chemotherapy presents status post chest wall mass excision.  She is doing well and reports small amount of drainage from the bottom of incision and itching around the incision but otherwise healing well.    -chest wall mass eval.  She recently had a chest wall nodule noted by her cardiologist Dr. Gil.  It does not cause her pain but she does not remember being there previously.  On her recent PET scan in July it is present however is not PET avid.  The PET scan in April appears very faintly present and much smaller but once again not PET avid.      Past Medical History:   Diagnosis Date    Arthritis     Cancer     lymphoma    Coronary artery disease     01/2015 LHC patent LCX. 50% stenosis in LAD and RCA.      Depression     Encounter for blood transfusion     GERD (gastroesophageal reflux disease)     Gout, arthritis     Hx of psychiatric care     Hyperlipidemia     Hypertension     Hypothyroidism     Lung nodule 2014    RML--stable    Pacemaker     Metronic    Pneumonia     Psychiatric problem        Past Surgical History:   Procedure Laterality Date    APPENDECTOMY      CARDIAC PACEMAKER PLACEMENT  01/22/2015    CHOLECYSTECTOMY      COLONOSCOPY N/A 4/6/2017    Procedure: COLONOSCOPY;  Surgeon: Tye Enamorado MD;  Location: [facility];  Service: Endoscopy;  Laterality: N/A;    CORONARY ANGIOPLASTY  02/2014    CORONARY STENT PLACEMENT  02/05/2014    GASTRIC BYPASS      HEMICOLECTOMY Right     HERNIA REPAIR      TONSILLECTOMY      TOTAL KNEE ARTHROPLASTY Bilateral        Family History   Problem Relation Age of Onset    Heart disease Mother     Hypertension Mother     Prostate cancer Mother     Stomach cancer Father      "ulcers that turned to cancer"    Pancreatic cancer Sister     Leukemia Brother  " "    "leukemia which led to intestinal cancer"    Drug abuse Son     COPD Daughter        Social History     Social History    Marital status:      Spouse name: N/A    Number of children: 4    Years of education: N/A     Social History Main Topics    Smoking status: Former Smoker    Smokeless tobacco: Never Used    Alcohol use No    Drug use: No    Sexual activity: Not Asked     Other Topics Concern    None     Social History Narrative    4 children (3 natural born, 1 adopted)- 2 ; 2x  (currently 17 years); son has prostate cancer, daughter COPD,  cardiac difficulty       Current Outpatient Prescriptions   Medication Sig Dispense Refill    albuterol 90 mcg/actuation inhaler Inhale 1-2 puffs into the lungs every 6 (six) hours as needed for Wheezing. Rescue 18 g 0    allopurinol (ZYLOPRIM) 300 MG tablet Take 1 tablet (300 mg total) by mouth once daily. 90 tablet 1    alprazolam (XANAX) 0.25 MG tablet Take 1 tablet (0.25 mg total) by mouth 2 (two) times daily as needed for Anxiety. 60 tablet 5    ascorbic acid, vitamin C, (VITAMIN C) 100 MG tablet Take by mouth once daily.      aspirin (ECOTRIN) 81 MG EC tablet Take 81 mg by mouth nightly.       clopidogrel (PLAVIX) 75 mg tablet Take 1 tablet (75 mg total) by mouth once daily. (Patient taking differently: Take 75 mg by mouth nightly. ) 90 tablet 3    colchicine (COLCRYS) 0.6 mg tablet Take 1 tablet (0.6 mg total) by mouth once daily. 90 tablet 3    ergocalciferol, vitamin D2, 400 unit Tab Take 1 tablet by mouth once daily.       fluticasone (FLONASE) 50 mcg/actuation nasal spray 2 sprays by Each Nare route 2 (two) times daily as needed for Allergies.       furosemide (LASIX) 20 MG tablet Take 1 tablet (20 mg total) by mouth once daily. 30 tablet 11    gabapentin (NEURONTIN) 100 MG capsule TAKE 1 CAPSULE(100 MG) BY MOUTH THREE TIMES DAILY 90 capsule 3    hydrocodone-acetaminophen 5-325mg (NORCO) 5-325 mg per tablet Take " 1 tablet by mouth every 6 (six) hours as needed for Pain. 5 tablet 0    inhalation spacing device (RITEFLO AEROCHAMBER) Use as directed for inhalation. 1 Device 0    isosorbide mononitrate (IMDUR) 30 MG 24 hr tablet Take 1 tablet (30 mg total) by mouth once daily. (Patient taking differently: Take 30 mg by mouth nightly. ) 90 tablet 3    levothyroxine (SYNTHROID) 75 MCG tablet TAKE 1 TABLET(75 MCG) BY MOUTH BEFORE BREAKFAST 90 tablet 1    losartan (COZAAR) 25 MG tablet Take 1 tablet (25 mg total) by mouth once daily. 90 tablet 3    meloxicam (MOBIC) 7.5 MG tablet TAKE 1 TABLET BY MOUTH EVERY DAY AS NEEDED FOR PAIN 90 tablet 0    metoprolol tartrate (LOPRESSOR) 25 MG tablet Take 1 tablet (25 mg total) by mouth 2 (two) times daily. 60 tablet 11    mirtazapine (REMERON SOL-TAB) 15 MG disintegrating tablet Take 1 tablet (15 mg total) by mouth nightly. 30 tablet 2    multivitamin (ONE DAILY MULTIVITAMIN) per tablet Take 1 tablet by mouth once daily.      nitroglycerin 400 mcg/spray SprA Place 1 spray under the tongue every 5 (five) minutes as needed for Chest pain.       ondansetron (ZOFRAN-ODT) 8 MG TbDL Take 1 tablet (8 mg total) by mouth every 12 (twelve) hours as needed (Nausea). 30 tablet 2    predniSONE (DELTASONE) 50 MG Tab One tablet po bid (Patient taking differently: Take 50 mg by mouth 2 (two) times daily. One tablet po bid for 5 days starting on day 1 of each [facility] chemotherapy cycle) 10 tablet 6    rosuvastatin (CRESTOR) 10 MG tablet Take 1 tablet (10 mg total) by mouth every evening. 30 tablet 5    sertraline (ZOLOFT) 100 MG tablet Take 1 tablet (100 mg total) by mouth once daily. 90 tablet 3    ZINC ACETATE ORAL Take 250 mg by mouth once daily.        Current Facility-Administered Medications   Medication Dose Route Frequency Provider Last Rate Last Dose    metoprolol tartrate (LOPRESSOR) tablet 25 mg  25 mg Oral BID Nader Gil MD           Review of patient's allergies indicates:    Allergen Reactions    Corticosteroids (glucocorticoids) Nausea Only and Other (See Comments)     Stomach pain, dizziness, headache    Oxycodone Other (See Comments)     Blood pressure dropped       Review of Systems   Constitutional: Negative for chills and fever.   HENT: Negative for congestion.    Eyes: Negative for visual disturbance.   Respiratory: Negative for cough and shortness of breath.    Cardiovascular: Negative for chest pain, palpitations and leg swelling.   Gastrointestinal: Negative for abdominal distention, abdominal pain, constipation, diarrhea, nausea and vomiting.   Endocrine: Negative for polyuria.   Genitourinary: Negative for dysuria.   Skin: Negative for rash.   Neurological: Negative for dizziness and light-headedness.   Hematological: Negative for adenopathy.       Objective:      Physical Exam   Constitutional: She is oriented to person, place, and time. She appears well-developed and well-nourished. No distress.   HENT:   Head: Normocephalic and atraumatic.   Eyes: EOM are normal.   Neck: Neck supple.   Cardiovascular: Normal rate and regular rhythm.    Pulmonary/Chest: Effort normal and breath sounds normal.       Left chest pacemaker   Abdominal: Soft. Bowel sounds are normal. She exhibits no distension. There is no tenderness.   Neurological: She is alert and oriented to person, place, and time.   Skin: Skin is warm and dry.   Vitals reviewed.      FINAL PATHOLOGIC DIAGNOSIS  1. COLON MASS, PARTIAL COLECTOMY- DIFFUSE LARGE B-CELL LYMPHOMA, GERMINAL CENTER  ORIGIN.    Chest wall mass pathology: Pending  Assessment:   S/p right hemicolectomy status post port a catheter with B-cell lymphoma status post biopsy chest wall mass  Plan:       Chest wall mass Pathology pending call patient with results  Monitor redness at bottom of incision likely from reaction to stitch, if worsens patient will call or come in for evaluation  Keep appointment with Dr. Matson

## 2017-09-06 NOTE — INTERVAL H&P NOTE
The patient has been examined and the H&P has been reviewed:    I concur with the findings and no changes have occurred since H&P was written.    Anesthesia/Surgery risks, benefits and alternative options discussed and understood by patient/family.  I have explained the risks, benefits , and alternatives of the procedure in detail.the patient voices understanding and all questions have been answered.the patient agrees to proceed as planned.            Active Hospital Problems    Diagnosis  POA    CHF (congestive heart failure) [I50.9]  Yes     Priority: Low      Resolved Hospital Problems    Diagnosis Date Resolved POA   No resolved problems to display.

## 2017-09-06 NOTE — H&P (VIEW-ONLY)
Subjective:   Patient ID:  Violet Swenson is a 77 y.o. female who presents for follow up of Congestive Heart Failure and Shortness of Breath      HPI  A 78 yo female with h/o lymphoma s/p chemotherapy with chf and drop in ef with h/o cad s/p lcx stent with negative cardiolite comes back for f/u she ahs been compliant with diet she is still short of breath  She is limited with her  activity. She goes to the doctor or grocery shopping she has no energy she has difficulty cooking w/o getting short of breath she uses a walker she can barely walk 100 feet w/o getting short of breath she is taking he rmeds regularily she is compliant with salt she is on b blockers nitrates arb and diuretics. No dizziness   Past Medical History:   Diagnosis Date    Arthritis     Cancer     lymphoma    Coronary artery disease     01/2015 LHC patent LCX. 50% stenosis in LAD and RCA.      Depression     Encounter for blood transfusion     GERD (gastroesophageal reflux disease)     Gout, arthritis     Hx of psychiatric care     Hyperlipidemia     Hypertension     Hypothyroidism     Lung nodule 2014    RML--stable    Pacemaker     Metronic    Pneumonia     Psychiatric problem        Past Surgical History:   Procedure Laterality Date    APPENDECTOMY      CARDIAC PACEMAKER PLACEMENT  01/22/2015    CHOLECYSTECTOMY      COLONOSCOPY N/A 4/6/2017    Procedure: COLONOSCOPY;  Surgeon: Tye Enamorado MD;  Location[facility];  Service: Endoscopy;  Laterality: N/A;    CORONARY ANGIOPLASTY  02/2014    CORONARY STENT PLACEMENT  02/05/2014    GASTRIC BYPASS      HEMICOLECTOMY Right     HERNIA REPAIR      TONSILLECTOMY      TOTAL KNEE ARTHROPLASTY Bilateral        Social History   Substance Use Topics    Smoking status: Former Smoker    Smokeless tobacco: Never Used    Alcohol use No       Family History   Problem Relation Age of Onset    Heart disease Mother     Hypertension Mother     Prostate cancer Mother      "Stomach cancer Father      "ulcers that turned to cancer"    Pancreatic cancer Sister     Leukemia Brother      "leukemia which led to intestinal cancer"    Drug abuse Son     COPD Daughter        Current Outpatient Prescriptions   Medication Sig    albuterol 90 mcg/actuation inhaler Inhale 1-2 puffs into the lungs every 6 (six) hours as needed for Wheezing. Rescue    allopurinol (ZYLOPRIM) 300 MG tablet Take 1 tablet (300 mg total) by mouth once daily.    alprazolam (XANAX) 0.25 MG tablet Take 1 tablet (0.25 mg total) by mouth 2 (two) times daily as needed for Anxiety.    ascorbic acid, vitamin C, (VITAMIN C) 100 MG tablet Take by mouth once daily.    aspirin (ECOTRIN) 81 MG EC tablet Take 81 mg by mouth nightly.     clopidogrel (PLAVIX) 75 mg tablet Take 1 tablet (75 mg total) by mouth once daily. (Patient taking differently: Take 75 mg by mouth nightly. )    colchicine (COLCRYS) 0.6 mg tablet Take 1 tablet (0.6 mg total) by mouth once daily.    ergocalciferol, vitamin D2, 400 unit Tab Take 1 tablet by mouth once daily.     fluticasone (FLONASE) 50 mcg/actuation nasal spray 2 sprays by Each Nare route 2 (two) times daily as needed for Allergies.     furosemide (LASIX) 20 MG tablet Take 1 tablet (20 mg total) by mouth once daily.    gabapentin (NEURONTIN) 100 MG capsule TAKE 1 CAPSULE(100 MG) BY MOUTH THREE TIMES DAILY    hydrocodone-acetaminophen 5-325mg (NORCO) 5-325 mg per tablet Take 1 tablet by mouth every 6 (six) hours as needed for Pain.    inhalation spacing device (RITEFLO AEROCHAMBER) Use as directed for inhalation.    isosorbide mononitrate (IMDUR) 30 MG 24 hr tablet Take 1 tablet (30 mg total) by mouth once daily. (Patient taking differently: Take 30 mg by mouth nightly. )    levothyroxine (SYNTHROID) 75 MCG tablet TAKE 1 TABLET(75 MCG) BY MOUTH BEFORE BREAKFAST    losartan (COZAAR) 25 MG tablet Take 1 tablet (25 mg total) by mouth once daily.    meloxicam (MOBIC) 7.5 MG tablet TAKE " 1 TABLET BY MOUTH EVERY DAY AS NEEDED FOR PAIN    metoprolol tartrate (LOPRESSOR) 25 MG tablet Take 1 tablet (25 mg total) by mouth 2 (two) times daily.    mirtazapine (REMERON SOL-TAB) 15 MG disintegrating tablet Take 1 tablet (15 mg total) by mouth nightly.    multivitamin (ONE DAILY MULTIVITAMIN) per tablet Take 1 tablet by mouth once daily.    nitroglycerin 400 mcg/spray SprA Place 1 spray under the tongue every 5 (five) minutes as needed for Chest pain.     ondansetron (ZOFRAN-ODT) 8 MG TbDL Take 1 tablet (8 mg total) by mouth every 12 (twelve) hours as needed (Nausea).    predniSONE (DELTASONE) 50 MG Tab One tablet po bid (Patient taking differently: Take 50 mg by mouth 2 (two) times daily. One tablet po bid for 5 days starting on day 1 of each [facility] chemotherapy cycle)    rosuvastatin (CRESTOR) 10 MG tablet Take 1 tablet (10 mg total) by mouth every evening.    sertraline (ZOLOFT) 100 MG tablet Take 1 tablet (100 mg total) by mouth once daily.    ZINC ACETATE ORAL Take 250 mg by mouth once daily.      Current Facility-Administered Medications   Medication    metoprolol tartrate (LOPRESSOR) tablet 25 mg     Current Outpatient Prescriptions on File Prior to Visit   Medication Sig    albuterol 90 mcg/actuation inhaler Inhale 1-2 puffs into the lungs every 6 (six) hours as needed for Wheezing. Rescue    allopurinol (ZYLOPRIM) 300 MG tablet Take 1 tablet (300 mg total) by mouth once daily.    alprazolam (XANAX) 0.25 MG tablet Take 1 tablet (0.25 mg total) by mouth 2 (two) times daily as needed for Anxiety.    ascorbic acid, vitamin C, (VITAMIN C) 100 MG tablet Take by mouth once daily.    aspirin (ECOTRIN) 81 MG EC tablet Take 81 mg by mouth nightly.     clopidogrel (PLAVIX) 75 mg tablet Take 1 tablet (75 mg total) by mouth once daily. (Patient taking differently: Take 75 mg by mouth nightly. )    colchicine (COLCRYS) 0.6 mg tablet Take 1 tablet (0.6 mg total) by mouth once daily.     ergocalciferol, vitamin D2, 400 unit Tab Take 1 tablet by mouth once daily.     fluticasone (FLONASE) 50 mcg/actuation nasal spray 2 sprays by Each Nare route 2 (two) times daily as needed for Allergies.     furosemide (LASIX) 20 MG tablet Take 1 tablet (20 mg total) by mouth once daily.    gabapentin (NEURONTIN) 100 MG capsule TAKE 1 CAPSULE(100 MG) BY MOUTH THREE TIMES DAILY    hydrocodone-acetaminophen 5-325mg (NORCO) 5-325 mg per tablet Take 1 tablet by mouth every 6 (six) hours as needed for Pain.    inhalation spacing device (RITEFLO AEROCHAMBER) Use as directed for inhalation.    isosorbide mononitrate (IMDUR) 30 MG 24 hr tablet Take 1 tablet (30 mg total) by mouth once daily. (Patient taking differently: Take 30 mg by mouth nightly. )    levothyroxine (SYNTHROID) 75 MCG tablet TAKE 1 TABLET(75 MCG) BY MOUTH BEFORE BREAKFAST    losartan (COZAAR) 25 MG tablet Take 1 tablet (25 mg total) by mouth once daily.    meloxicam (MOBIC) 7.5 MG tablet TAKE 1 TABLET BY MOUTH EVERY DAY AS NEEDED FOR PAIN    metoprolol tartrate (LOPRESSOR) 25 MG tablet Take 1 tablet (25 mg total) by mouth 2 (two) times daily.    mirtazapine (REMERON SOL-TAB) 15 MG disintegrating tablet Take 1 tablet (15 mg total) by mouth nightly.    multivitamin (ONE DAILY MULTIVITAMIN) per tablet Take 1 tablet by mouth once daily.    nitroglycerin 400 mcg/spray SprA Place 1 spray under the tongue every 5 (five) minutes as needed for Chest pain.     ondansetron (ZOFRAN-ODT) 8 MG TbDL Take 1 tablet (8 mg total) by mouth every 12 (twelve) hours as needed (Nausea).    predniSONE (DELTASONE) 50 MG Tab One tablet po bid (Patient taking differently: Take 50 mg by mouth 2 (two) times daily. One tablet po bid for 5 days starting on day 1 of each [facility] chemotherapy cycle)    rosuvastatin (CRESTOR) 10 MG tablet Take 1 tablet (10 mg total) by mouth every evening.    sertraline (ZOLOFT) 100 MG tablet Take 1 tablet (100 mg total) by mouth once daily.  "   ZINC ACETATE ORAL Take 250 mg by mouth once daily.      Current Facility-Administered Medications on File Prior to Visit   Medication    metoprolol tartrate (LOPRESSOR) tablet 25 mg       ROS  Constitution: Negative for diaphoresis no weight gain , HAS weakness, malaise/fatigue   HENT: Negative for headaches and hoarse voice.    Eyes: Negative for double vision and visual disturbance.   Cardiovascular: Positive for chest pain RESOLVED, dyspnea on exertion STILL PRESENT  and leg swelling RESOLVED. Negative for claudication, cyanosis, irregular heartbeat, near-syncope, orthopnea, palpitations, paroxysmal nocturnal dyspnea and syncope.   Respiratory: Positive for shortness of breath. Negative for cough, hemoptysis and snoring.    Endocrine:        Hair loss   Hematologic/Lymphatic: Negative for bleeding problem. Does not bruise/bleed easily.   Skin: Negative for color change and poor wound healing.   Musculoskeletal: Negative for muscle cramps, muscle weakness and myalgias.   Gastrointestinal: Negative for bloating, abdominal pain, change in bowel habit, diarrhea, heartburn, hematemesis, hematochezia, melena and nausea.   Neurological: Negative for excessive daytime sleepiness, dizziness, light-headedness, loss of balance and numbness.   Psychiatric/Behavioral: Negative for memory loss. The patient does not have insomnia.    Allergic/Immunologic: Negative for hives.   Objective:   Physical Exam  Vitals:    09/01/17 0816   BP: 122/80   Pulse: 86   Weight: 73.5 kg (162 lb 0.6 oz)   Height: 5' 4" (1.626 m)   Constitutional: She is oriented to person, place, and time. She appears well-developed and well-nourished. She does not appear ill. No distress.   HENT:   Head: Normocephalic and atraumatic.   Eyes: EOM are normal. Pupils are equal, round, and reactive to light. No scleral icterus.   Neck: Normal range of motion. Neck supple. Normal carotid pulses, no hepatojugular reflux and no JVD present. Carotid bruit is not " present. No tracheal deviation present. No thyromegaly present.   Cardiovascular: Normal rate, regular rhythm, normal heart sounds, intact distal pulses and normal pulses.  Exam reveals no gallop and no friction rub.    No murmur heard.  SHE AHS A WEAK LEFT RADIAL PULSE COMPARED TO THE RT .  Pulmonary/Chest: Effort normal and breath sounds normal. No respiratory distress. She has no wheezes. She has no rhonchi. She has no rales. She exhibits no tenderness.   Pacer site well healed  has rt tender mobile mass on the rt side of sternum around 2x3 cm tender. RESOLVED SCAR WELL HEALED.  Abdominal: Soft. Normal appearance, normal aorta and bowel sounds are normal. She exhibits no distension, no abdominal bruit, no ascites and no pulsatile midline mass. There is no hepatomegaly. There is no tenderness.   Musculoskeletal: She exhibits trace  edema.    Varicose veins in legs noted.   Neurological: She is alert and oriented to person, place, and time. She has normal strength. No cranial nerve deficit. Coordination normal.   Skin: Skin is warm and dry. No rash noted. She is not diaphoretic. No cyanosis or erythema. Nails show no clubbing.   Psychiatric: She has a normal mood and affect. Her speech is normal and behavior is normal.   Nursing note and vitals reviewed  Lab Results   Component Value Date    CHOL 138 04/05/2017    CHOL 114 (L) 11/07/2016    CHOL 107 (L) 10/03/2016     Lab Results   Component Value Date    HDL 58 04/05/2017    HDL 51 11/07/2016    HDL 52 10/03/2016     Lab Results   Component Value Date    LDLCALC 62.8 (L) 04/05/2017    LDLCALC 41.8 (L) 11/07/2016    LDLCALC 38.6 (L) 10/03/2016     Lab Results   Component Value Date    TRIG 86 04/05/2017    TRIG 106 11/07/2016    TRIG 82 10/03/2016     Lab Results   Component Value Date    CHOLHDL 42.0 04/05/2017    CHOLHDL 44.7 11/07/2016    CHOLHDL 48.6 10/03/2016       Chemistry        Component Value Date/Time     08/23/2017 1126    K 5.0 08/23/2017 1126      (H) 08/23/2017 1126    CO2 21 (L) 08/23/2017 1126    BUN 20 08/23/2017 1126    CREATININE 0.9 08/23/2017 1126    GLU 73 08/23/2017 1126        Component Value Date/Time    CALCIUM 8.6 (L) 08/23/2017 1126    ALKPHOS 93 08/02/2017 0858    AST 20 08/02/2017 0858    ALT 19 08/02/2017 0858    BILITOT 0.3 08/02/2017 0858    ESTGFRAFRICA >60.0 08/23/2017 1126    EGFRNONAA >60.0 08/23/2017 1126          Lab Results   Component Value Date    TSH 2.702 04/06/2017     Lab Results   Component Value Date    INR 1.0 04/05/2017     Lab Results   Component Value Date    WBC 6.42 08/02/2017    HGB 9.9 (L) 08/02/2017    HCT 31.4 (L) 08/02/2017    MCV 87 08/02/2017     (H) 08/02/2017     BMP  Sodium   Date Value Ref Range Status   08/23/2017 141 136 - 145 mmol/L Final     Potassium   Date Value Ref Range Status   08/23/2017 5.0 3.5 - 5.1 mmol/L Final     Chloride   Date Value Ref Range Status   08/23/2017 114 (H) 95 - 110 mmol/L Final     CO2   Date Value Ref Range Status   08/23/2017 21 (L) 23 - 29 mmol/L Final     BUN, Bld   Date Value Ref Range Status   08/23/2017 20 8 - 23 mg/dL Final     Creatinine   Date Value Ref Range Status   08/23/2017 0.9 0.5 - 1.4 mg/dL Final     Calcium   Date Value Ref Range Status   08/23/2017 8.6 (L) 8.7 - 10.5 mg/dL Final     Anion Gap   Date Value Ref Range Status   08/23/2017 6 (L) 8 - 16 mmol/L Final     eGFR if    Date Value Ref Range Status   08/23/2017 >60.0 >60 mL/min/1.73 m^2 Final     eGFR if non    Date Value Ref Range Status   08/23/2017 >60.0 >60 mL/min/1.73 m^2 Final     Comment:     Calculation used to obtain the estimated glomerular filtration  rate (eGFR) is the CKD-EPI equation. Since race is unknown   in our information system, the eGFR values for   -American and Non--American patients are given   for each creatinine result.       CrCl cannot be calculated (Patient's most recent lab result is older than the maximum 7 days  allowed.).    Assessment:     1. Ischemic cardiomyopathy    2. Essential hypertension    3. Hyperlipidemia, unspecified hyperlipidemia type    4. Hypothyroidism (acquired)    5. CAD, multiple vessel    6. S/P coronary artery stent placement    7. Pacemaker    8. Renal insufficiency    9. Coronary artery disease involving native coronary artery of native heart without angina pectoris    10. Diffuse large B-cell lymphoma of intra-abdominal lymph nodes    11. S/P gastric bypass    12. Lymphoma, unspecified body region, unspecified lymphoma type    13. Thrombocytopenia    14. Traumatic hematoma of left upper arm, initial encounter    15. Congestive heart failure, unspecified congestive heart failure chronicity, unspecified congestive heart failure type    16. 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    17. Ground glass opacity present on imaging of lung      THE PATIENT IS WELL COMPENSATED SHE HAS NO CHF CLINICALLY BY EXAM HER NECK VEINS ARE FLAT HER EDEMA RESOLVED CT SCAN NORMALIZED SHE CONTINUES TO HAVE WEAKNESS FATIGUE DECREASE IN EXERCISE TOLERANCE DESPITE A NEGATIVE CARDIOLITE. I THINK WE NEED TO R/O ANEMIA AS A CAUSE OF HER SYMPTOMS AND IF NOT THEN SHE NEEDS A R/LHC PERFORMED.    Plan:   STAT CBC BMP   IF ANEMIC WILL GET dr carrasco to address   If not rt/lhc  I have explained the risks, benefits , and alternatives of the procedure in detail.the patient voices understanding and all questions have been answered.the patient agrees to proceed as planned.

## 2017-09-07 ENCOUNTER — TELEPHONE (OUTPATIENT)
Dept: SURGERY | Facility: HOSPITAL | Age: 77
End: 2017-09-07

## 2017-09-07 VITALS
WEIGHT: 162 LBS | OXYGEN SATURATION: 99 % | DIASTOLIC BLOOD PRESSURE: 83 MMHG | HEIGHT: 64 IN | RESPIRATION RATE: 18 BRPM | BODY MASS INDEX: 27.66 KG/M2 | SYSTOLIC BLOOD PRESSURE: 175 MMHG | TEMPERATURE: 98 F | HEART RATE: 64 BPM

## 2017-09-07 PROCEDURE — 25000003 PHARM REV CODE 250: Performed by: INTERNAL MEDICINE

## 2017-09-07 PROCEDURE — 99217 PR OBSERVATION CARE DISCHARGE: CPT | Mod: ,,, | Performed by: INTERNAL MEDICINE

## 2017-09-07 RX ORDER — HEPARIN 100 UNIT/ML
5 SYRINGE INTRAVENOUS ONCE
Status: DISCONTINUED | OUTPATIENT
Start: 2017-09-07 | End: 2017-09-07 | Stop reason: HOSPADM

## 2017-09-07 RX ADMIN — CLOPIDOGREL BISULFATE 75 MG: 75 TABLET ORAL at 10:09

## 2017-09-07 RX ADMIN — SERTRALINE HYDROCHLORIDE 100 MG: 50 TABLET ORAL at 10:09

## 2017-09-07 RX ADMIN — LEVOTHYROXINE SODIUM 75 MCG: 75 TABLET ORAL at 05:09

## 2017-09-07 RX ADMIN — OXYCODONE HYDROCHLORIDE AND ACETAMINOPHEN 500 MG: 500 TABLET ORAL at 10:09

## 2017-09-07 RX ADMIN — FUROSEMIDE 20 MG: 20 TABLET ORAL at 10:09

## 2017-09-07 RX ADMIN — COLCHICINE 0.6 MG: 0.6 TABLET, FILM COATED ORAL at 10:09

## 2017-09-07 RX ADMIN — ISOSORBIDE MONONITRATE 30 MG: 30 TABLET, EXTENDED RELEASE ORAL at 10:09

## 2017-09-07 RX ADMIN — ALLOPURINOL 300 MG: 300 TABLET ORAL at 10:09

## 2017-09-07 RX ADMIN — THERA TABS 1 TABLET: TAB at 10:09

## 2017-09-07 RX ADMIN — LOSARTAN POTASSIUM 25 MG: 25 TABLET, FILM COATED ORAL at 10:09

## 2017-09-07 RX ADMIN — METOPROLOL TARTRATE 25 MG: 25 TABLET ORAL at 10:09

## 2017-09-07 NOTE — TELEPHONE ENCOUNTER
Discussed pathology with patient from lymph node biopsy  FINAL PATHOLOGIC DIAGNOSIS  [place] DIAGNOSIS:  LYMPH NODE, CHEST WALL, BIOPSY ([id number]; 8/24/2017):  -Organizing hematoma. No morphologic or immunophenotypic features of malignant lymphoma.  She will keep her appointment with Dr. Matson  Follow-up with me when necessary

## 2017-09-07 NOTE — PLAN OF CARE
Problem: Patient Care Overview  Goal: Plan of Care Review  POC reviewed with pt. Verbalized understanding. Pt denies pain/discomfort this shift. Dressing to R groin clean dry and intact. VSS. Bedrest ended at 2130. Pt up ad vania ambulating in room. POC on going.

## 2017-09-07 NOTE — DISCHARGE SUMMARY
[facility]  Cardiology  Discharge Summary      Patient Name: Violet Swenosn  MRN: [id number]  Admission Date: 9/6/2017  Hospital Length of Stay: 0 days  Discharge Date and Time:  09/07/2017 9:51 AM  Attending Physician: Nader Gil MD  Discharging Provider: ELIZABETH Li  Primary Care Physician: Marti Coronel MD    Procedure(s) (LRB):  HEART CATH-BILATERAL (N/A)     Indwelling Lines/Drains at time of discharge:  Lines/Drains/Airways     Central Venous Catheter Line                 Port A Cath Single Lumen 05/15/17 1356 right subclavian 114 days         Port A Cath Single Lumen 09/06/17 1234 right subclavian less than 1 day                Hospital Course   Ms. Swenson is a 77 year old female patient with history of CAD and anginal equivalent who presented to [facility] on 9/6/17 for elective L/RHC. L/RHC subsequently showed patent LCX stent and non-obstructive RCA disease and normal filling pressures. Patient tolerated procedure well and was admitted overnight for observation. Patient seen and examined today by myself and Dr. Gil and deemed suitable for discharge. No acute events overnight. Denies any chest pain. Right groin site C/D/I; no bleeding. Patient will be discharged home on her home medications. She was educated about post-cath restrictions and will need to follow-up with PA in 1 week.       Significant Diagnostic Studies: [facility]    Pending Diagnostic Studies:     Procedure Component Value Units Date/Time    IR Heart Cath Images [[id number]] Resulted:  09/06/17 1516    Order Status:  Sent Lab Status:  In process Updated:  09/06/17 1516          Final Active Diagnoses:    Diagnosis Date Noted POA    CHF (congestive heart failure) [I50.9] 08/17/2017 Yes      Problems Resolved During this Admission:    Diagnosis Date Noted Date Resolved POA       Discharged Condition: good    Follow Up:  Follow-up Information     ELIZABETH Li.    Specialty:  Cardiology  Contact  information:  [address] [place] [address] DR Sarmad WIN [id number]  [phone]                 Patient Instructions:     Diet general   Order Specific Question Answer Comments   Fat restriction, if any: 60g Fat      Activity as tolerated     Other restrictions (specify):   Order Comments: No heavy bending or lifting next 5 days  Showers only next 5 days; no baths     Call MD for:  temperature >100.4     Call MD for:  persistent nausea and vomiting or diarrhea     Call MD for:  severe uncontrolled pain     Call MD for:  redness, tenderness, or signs of infection (pain, swelling, redness, odor or green/yellow discharge around incision site)     Call MD for:  difficulty breathing or increased cough     Call MD for:  severe persistent headache     Call MD for:  worsening rash     Call MD for:  persistent dizziness, light-headedness, or visual disturbances     Call MD for:  increased confusion or weakness     Remove dressing in 24 hours       Medications:  Reconciled Home Medications:   Current Discharge Medication List      CONTINUE these medications which have NOT CHANGED    Details   allopurinol (ZYLOPRIM) 300 MG tablet Take 1 tablet (300 mg total) by mouth once daily.  Qty: 90 tablet, Refills: 1    Associated Diagnoses: Idiopathic chronic gout of multiple sites without tophus      alprazolam (XANAX) 0.25 MG tablet Take 1 tablet (0.25 mg total) by mouth 2 (two) times daily as needed for Anxiety.  Qty: 60 tablet, Refills: 5    Associated Diagnoses: Situational anxiety      ascorbic acid, vitamin C, (VITAMIN C) 100 MG tablet Take by mouth once daily.      aspirin (ECOTRIN) 81 MG EC tablet Take 81 mg by mouth nightly.       clopidogrel (PLAVIX) 75 mg tablet Take 1 tablet (75 mg total) by mouth once daily.  Qty: 90 tablet, Refills: 3    Associated Diagnoses: Coronary artery disease without angina pectoris, unspecified vessel or lesion type, unspecified whether native or transplanted heart      colchicine (COLCRYS) 0.6 mg  tablet Take 1 tablet (0.6 mg total) by mouth once daily.  Qty: 90 tablet, Refills: 3    Associated Diagnoses: Idiopathic chronic gout of multiple sites without tophus      ergocalciferol, vitamin D2, 400 unit Tab Take 1 tablet by mouth once daily.       fluticasone (FLONASE) 50 mcg/actuation nasal spray 2 sprays by Each Nare route 2 (two) times daily as needed for Allergies.       furosemide (LASIX) 20 MG tablet Take 1 tablet (20 mg total) by mouth once daily.  Qty: 30 tablet, Refills: 11    Associated Diagnoses: Diffusion capacity of lung (dl), decreased; Ischemic cardiomyopathy      gabapentin (NEURONTIN) 100 MG capsule TAKE 1 CAPSULE(100 MG) BY MOUTH THREE TIMES DAILY  Qty: 90 capsule, Refills: 3    Associated Diagnoses: Neuropathic pain of foot, right      isosorbide mononitrate (IMDUR) 30 MG 24 hr tablet Take 1 tablet (30 mg total) by mouth once daily.  Qty: 90 tablet, Refills: 3    Associated Diagnoses: Coronary artery disease involving native coronary artery of native heart with unstable angina pectoris      levothyroxine (SYNTHROID) 75 MCG tablet TAKE 1 TABLET(75 MCG) BY MOUTH BEFORE BREAKFAST  Qty: 90 tablet, Refills: 1    Associated Diagnoses: Hypothyroidism (acquired)      losartan (COZAAR) 25 MG tablet Take 1 tablet (25 mg total) by mouth once daily.  Qty: 90 tablet, Refills: 3    Associated Diagnoses: CAD, multiple vessel; S/P coronary artery stent placement; Ischemic cardiomyopathy; Lymphoma, unspecified body region, unspecified lymphoma type      meloxicam (MOBIC) 7.5 MG tablet TAKE 1 TABLET BY MOUTH EVERY DAY AS NEEDED FOR PAIN  Qty: 90 tablet, Refills: 0    Associated Diagnoses: Primary osteoarthritis involving multiple joints      metoprolol tartrate (LOPRESSOR) 25 MG tablet Take 1 tablet (25 mg total) by mouth 2 (two) times daily.  Qty: 60 tablet, Refills: 11      mirtazapine (REMERON SOL-TAB) 15 MG disintegrating tablet Take 1 tablet (15 mg total) by mouth nightly.  Qty: 30 tablet, Refills: 2     Associated Diagnoses: Diffuse large B-cell lymphoma of intra-abdominal lymph nodes; Primary insomnia      multivitamin (ONE DAILY MULTIVITAMIN) per tablet Take 1 tablet by mouth once daily.      rosuvastatin (CRESTOR) 10 MG tablet Take 1 tablet (10 mg total) by mouth every evening.  Qty: 30 tablet, Refills: 5    Associated Diagnoses: Hyperlipidemia, unspecified hyperlipidemia type      sertraline (ZOLOFT) 100 MG tablet Take 1 tablet (100 mg total) by mouth once daily.  Qty: 90 tablet, Refills: 3    Comments: **Patient requests 90 days supply**  Associated Diagnoses: Major depression, chronic; Situational anxiety; Insomnia secondary to anxiety      ZINC ACETATE ORAL Take 250 mg by mouth once daily.       albuterol 90 mcg/actuation inhaler Inhale 1-2 puffs into the lungs every 6 (six) hours as needed for Wheezing. Rescue  Qty: 18 g, Refills: 0    Associated Diagnoses: Acute bronchitis, unspecified organism; Fatigue, unspecified type; SOB (shortness of breath)      hydrocodone-acetaminophen 5-325mg (NORCO) 5-325 mg per tablet Take 1 tablet by mouth every 6 (six) hours as needed for Pain.  Qty: 5 tablet, Refills: 0      inhalation spacing device (RITEFLO AEROCHAMBER) Use as directed for inhalation.  Qty: 1 Device, Refills: 0    Associated Diagnoses: Acute bronchitis, unspecified organism      ondansetron (ZOFRAN-ODT) 8 MG TbDL Take 1 tablet (8 mg total) by mouth every 12 (twelve) hours as needed (Nausea).  Qty: 30 tablet, Refills: 2    Associated Diagnoses: Nausea      predniSONE (DELTASONE) 50 MG Tab One tablet po bid  Qty: 10 tablet, Refills: 6    Associated Diagnoses: Nausea; Lymphoma malignant, large cell         STOP taking these medications       nitroglycerin 400 mcg/spray SprA Comments:   Reason for Stopping:               Time spent on the discharge of patient: 20 minutes    ELIZABETH Li  Cardiology  [facility]

## 2017-09-07 NOTE — PROGRESS NOTES
Went over discharge instructions with patient.   Stressed importance of making and keeping all follow ups.   Patient verbalized understanding and has no questions in regards to discharge.  De acessed port by IMELDA Green.  Telemetry box removed.  Patient dressed and awaiting ride.

## 2017-09-07 NOTE — PROGRESS NOTES
Received pt from [facility]. Right groin site intact. Pt awake and alert, spouse at bedside. Will continue to monitor.

## 2017-09-12 ENCOUNTER — PATIENT OUTREACH (OUTPATIENT)
Dept: ADMINISTRATIVE | Facility: HOSPITAL | Age: 77
End: 2017-09-12

## 2017-09-12 ENCOUNTER — TELEPHONE (OUTPATIENT)
Dept: RADIOLOGY | Facility: HOSPITAL | Age: 77
End: 2017-09-12

## 2017-09-12 DIAGNOSIS — F51.01 PRIMARY INSOMNIA: ICD-10-CM

## 2017-09-12 DIAGNOSIS — C83.33 DIFFUSE LARGE B-CELL LYMPHOMA OF INTRA-ABDOMINAL LYMPH NODES: ICD-10-CM

## 2017-09-12 RX ORDER — MIRTAZAPINE 15 MG/1
15 TABLET, ORALLY DISINTEGRATING ORAL NIGHTLY
Qty: 30 TABLET | Refills: 2 | Status: SHIPPED | OUTPATIENT
Start: 2017-09-12 | End: 2017-11-09 | Stop reason: SDUPTHER

## 2017-09-12 RX ORDER — MIRTAZAPINE 15 MG/1
TABLET, ORALLY DISINTEGRATING ORAL
Qty: 30 TABLET | Refills: 0 | Status: CANCELLED | OUTPATIENT
Start: 2017-09-12

## 2017-09-12 NOTE — PROGRESS NOTES
Last documented 2017 Blood Pressure 122/80 on 09/01/17 routed to [facility] as attestation documentation for Blood Pressure Controlled measure. Measure has been met.

## 2017-09-13 ENCOUNTER — HOSPITAL ENCOUNTER (OUTPATIENT)
Dept: RADIOLOGY | Facility: HOSPITAL | Age: 77
Discharge: HOME | End: 2017-09-13
Attending: INTERNAL MEDICINE
Payer: MEDICARE

## 2017-09-13 DIAGNOSIS — C85.80 LYMPHOMA MALIGNANT, LARGE CELL: ICD-10-CM

## 2017-09-13 PROCEDURE — A9552 F18 FDG: HCPCS

## 2017-09-13 PROCEDURE — 78815 PET IMAGE W/CT SKULL-THIGH: CPT | Mod: 26,PS,, | Performed by: RADIOLOGY

## 2017-09-14 ENCOUNTER — OFFICE VISIT (OUTPATIENT)
Dept: HEMATOLOGY/ONCOLOGY | Facility: CLINIC | Age: 77
End: 2017-09-14
Payer: MEDICARE

## 2017-09-14 ENCOUNTER — TELEPHONE (OUTPATIENT)
Dept: HEMATOLOGY/ONCOLOGY | Facility: CLINIC | Age: 77
End: 2017-09-14

## 2017-09-14 ENCOUNTER — LAB VISIT (OUTPATIENT)
Dept: LAB | Facility: HOSPITAL | Age: 77
End: 2017-09-14
Attending: INTERNAL MEDICINE
Payer: MEDICARE

## 2017-09-14 VITALS
WEIGHT: 160.69 LBS | SYSTOLIC BLOOD PRESSURE: 110 MMHG | DIASTOLIC BLOOD PRESSURE: 63 MMHG | TEMPERATURE: 98 F | HEART RATE: 83 BPM | BODY MASS INDEX: 27.43 KG/M2 | RESPIRATION RATE: 12 BRPM | OXYGEN SATURATION: 98 % | HEIGHT: 64 IN

## 2017-09-14 DIAGNOSIS — R26.81 UNSTEADY GAIT: Primary | ICD-10-CM

## 2017-09-14 DIAGNOSIS — M1A.09X0 IDIOPATHIC CHRONIC GOUT OF MULTIPLE SITES WITHOUT TOPHUS: ICD-10-CM

## 2017-09-14 DIAGNOSIS — D64.9 CHRONIC ANEMIA: ICD-10-CM

## 2017-09-14 DIAGNOSIS — C85.80 LYMPHOMA MALIGNANT, LARGE CELL: ICD-10-CM

## 2017-09-14 DIAGNOSIS — C83.33 DIFFUSE LARGE B-CELL LYMPHOMA OF INTRA-ABDOMINAL LYMPH NODES: Primary | ICD-10-CM

## 2017-09-14 LAB
ALBUMIN SERPL BCP-MCNC: 2.8 G/DL
ALP SERPL-CCNC: 126 U/L
ALT SERPL W/O P-5'-P-CCNC: 88 U/L
ANION GAP SERPL CALC-SCNC: 9 MMOL/L
AST SERPL-CCNC: 94 U/L
BASOPHILS # BLD AUTO: 0.01 K/UL
BASOPHILS NFR BLD: 0.1 %
BILIRUB SERPL-MCNC: 0.2 MG/DL
BUN SERPL-MCNC: 20 MG/DL
CALCIUM SERPL-MCNC: 8.8 MG/DL
CHLORIDE SERPL-SCNC: 114 MMOL/L
CO2 SERPL-SCNC: 19 MMOL/L
CREAT SERPL-MCNC: 1.1 MG/DL
DIFFERENTIAL METHOD: ABNORMAL
EOSINOPHIL # BLD AUTO: 0.1 K/UL
EOSINOPHIL NFR BLD: 1.6 %
ERYTHROCYTE [DISTWIDTH] IN BLOOD BY AUTOMATED COUNT: 21.5 %
EST. GFR  (AFRICAN AMERICAN): 56 ML/MIN/1.73 M^2
EST. GFR  (NON AFRICAN AMERICAN): 49 ML/MIN/1.73 M^2
GLUCOSE SERPL-MCNC: 96 MG/DL
HCT VFR BLD AUTO: 32.7 %
HGB BLD-MCNC: 10.2 G/DL
LYMPHOCYTES # BLD AUTO: 2.5 K/UL
LYMPHOCYTES NFR BLD: 33.3 %
MCH RBC QN AUTO: 30.7 PG
MCHC RBC AUTO-ENTMCNC: 31.2 G/DL
MCV RBC AUTO: 99 FL
MONOCYTES # BLD AUTO: 0.7 K/UL
MONOCYTES NFR BLD: 8.9 %
NEUTROPHILS # BLD AUTO: 4.3 K/UL
NEUTROPHILS NFR BLD: 56.1 %
PLATELET # BLD AUTO: 249 K/UL
PMV BLD AUTO: 9.9 FL
POTASSIUM SERPL-SCNC: 4.4 MMOL/L
PROT SERPL-MCNC: 5.4 G/DL
RBC # BLD AUTO: 3.32 M/UL
SODIUM SERPL-SCNC: 142 MMOL/L
URATE SERPL-MCNC: 6.1 MG/DL
WBC # BLD AUTO: 7.57 K/UL

## 2017-09-14 PROCEDURE — 80053 COMPREHEN METABOLIC PANEL: CPT

## 2017-09-14 PROCEDURE — 1159F MED LIST DOCD IN RCRD: CPT | Mod: S$GLB,,, | Performed by: INTERNAL MEDICINE

## 2017-09-14 PROCEDURE — 3078F DIAST BP <80 MM HG: CPT | Mod: S$GLB,,, | Performed by: INTERNAL MEDICINE

## 2017-09-14 PROCEDURE — 99999 PR PBB SHADOW E&M-EST. PATIENT-LVL IV: CPT | Mod: PBBFAC,,, | Performed by: INTERNAL MEDICINE

## 2017-09-14 PROCEDURE — 3074F SYST BP LT 130 MM HG: CPT | Mod: S$GLB,,, | Performed by: INTERNAL MEDICINE

## 2017-09-14 PROCEDURE — 3008F BODY MASS INDEX DOCD: CPT | Mod: S$GLB,,, | Performed by: INTERNAL MEDICINE

## 2017-09-14 PROCEDURE — 36415 COLL VENOUS BLD VENIPUNCTURE: CPT | Mod: PO

## 2017-09-14 PROCEDURE — 99214 OFFICE O/P EST MOD 30 MIN: CPT | Mod: S$GLB,,, | Performed by: INTERNAL MEDICINE

## 2017-09-14 PROCEDURE — 84550 ASSAY OF BLOOD/URIC ACID: CPT

## 2017-09-14 PROCEDURE — 99499 UNLISTED E&M SERVICE: CPT | Mod: S$GLB,,, | Performed by: INTERNAL MEDICINE

## 2017-09-14 PROCEDURE — 85025 COMPLETE CBC W/AUTO DIFF WBC: CPT

## 2017-09-14 PROCEDURE — 1157F ADVNC CARE PLAN IN RCRD: CPT | Mod: S$GLB,,, | Performed by: INTERNAL MEDICINE

## 2017-09-14 PROCEDURE — 1126F AMNT PAIN NOTED NONE PRSNT: CPT | Mod: S$GLB,,, | Performed by: INTERNAL MEDICINE

## 2017-09-14 NOTE — PROGRESS NOTES
Subjective:       Patient ID: Violet Swenson is a 77 y.o. female.    Chief Complaint: Follow-up    HPI This is a 77-year-old  lady who comes for follow up of hr large cell lymphoma.  She  was   admitted to the hospital in early April with diffuse abdominal pain. A CT of   the abdomen done on 04/05/2017 was reported as showing a 6.9 x 7.0 x 8.4 cm soft  tissue mass in the right lower quadrant in the terminal ileum abutting the   cecum. There was adjacent conglomerate adenopathy in the right lower quadrant   mesentery.     The patient had a colonoscopy that showed a lesion in the terminal ileum.     She underwent a right hemicolectomy and terminal ileum removal. 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  She had a Ct/PET that shows evidence of surgery and some non specific findings, but no evidence of activer disease elsewhere.  An ECHO showed normal cardiac ejection fraction, as wella s a MUGA.  She was seen cardiology and cleared for ADRIAMYCIN administration.  She is negative for Hep B/C and HIV  Bone marrow was negative. S    The patient started  R-CHOP. Ttreatmtnt    She tolerated the treatment with some minor difficulties. After 3 cycles, she had a repeat CT/PET  There was no activity in the abdomen.  There was development of ground glass opacities/infiltrates in both lungs which were hypermetabolic although the SUV was not reported.  We discussed the imaging studies with Radiology and Pulmonary.  Dr Corral of the Pulmonary Department favored the findings to be due to pulmonary congestion.  Her troponin is normal, but her BNP is markedly elevated at 1,103.  She was started on Lasix by dr Corral and asked to have a  Ct in few weeks  The patient   had evaluation by Cardiology ( dr Gil).It was felt had developed CHF., with a decrease in her ejection raction to 47%., elevated BNP and imagign studies.  The patient indicated her leg  edema and SOB    improved on lasix. Repeat PET showed clearance of all the infiltrates  Since, she has had a complete work including cardiac catheterization wirh negative findings.  A repeat CB/PET done  showed perxistence clearance of the previous infiltartes    She is due for a CT scan of the chest later on today   .   ALLERGIES: Oxycodone. The chart says that she has allergic to steroids, but   says she is not.     MEDICATIONS: See MedCard.     PREVIOUS SURGERIES: Appendectomy in , tonsillectomy at age 19, gastric   bypass with incidental cholecystectomy, bilateral knee replacement in .     SOCIAL HISTORY: She is . She had two natural children, and one adopted   one. The adopted child has . She lives in [place] with her   . She smoked for two years, averaging a pack a day. She stopped 35   years ago or so. Denies any alcohol intake. She worked in [employer].     FAMILY HISTORY: Father  of colon cancer. Younger brother had leukemia that  transform into a lymphoma. Sister had pancreatic cancer.  Review of Systems   Constitutional: Negative.    HENT: Negative.    Eyes: Negative.    Respiratory: Negative.  Negative for cough and wheezing.    Cardiovascular: Negative.  Negative for chest pain.   Gastrointestinal: Negative.    Genitourinary: Negative.    Neurological: Negative.    Psychiatric/Behavioral: Negative.        Objective:      Physical Exam   Constitutional: She is oriented to person, place, and time. She appears well-developed. No distress.   HENT:   Head: Normocephalic.   Right Ear: Tympanic membrane, external ear and ear canal normal.   Left Ear: Tympanic membrane, external ear and ear canal normal.   Nose: Nose normal. Right sinus exhibits no maxillary sinus tenderness and no frontal sinus tenderness. Left sinus exhibits no maxillary sinus tenderness and no frontal sinus tenderness.   Mouth/Throat: Oropharynx is clear and moist and mucous membranes are normal.   Teeth  normal.  Gums normal.   Eyes: Conjunctivae and lids are normal. Pupils are equal, round, and reactive to light.   Neck: Normal carotid pulses, no hepatojugular reflux and no JVD present. Carotid bruit is not present. No tracheal deviation present. No thyroid mass and no thyromegaly present.   Cardiovascular: Normal rate, regular rhythm, S1 normal, S2 normal, normal heart sounds and intact distal pulses.  Exam reveals no gallop and no friction rub.    No murmur heard.  Carotid exam normal   Pulmonary/Chest: Effort normal and breath sounds normal. No accessory muscle usage. No respiratory distress. She has no wheezes. She has no rales. She exhibits no tenderness.   Abdominal: Soft. Normal appearance. She exhibits no distension and no mass. There is no splenomegaly or hepatomegaly. There is no tenderness. There is no rebound and no guarding.   Musculoskeletal: Normal range of motion. She exhibits no edema or tenderness.        Right hand: Normal.        Left hand: Normal.       Lymphadenopathy:     She has no cervical adenopathy.     She has no axillary adenopathy.        Right: No inguinal and no supraclavicular adenopathy present.        Left: No inguinal and no supraclavicular adenopathy present.   Neurological: She is alert and oriented to person, place, and time. She has normal strength. No cranial nerve deficit. Coordination normal.   Skin: Skin is warm and dry. No rash noted. She is not diaphoretic. No cyanosis or erythema. No pallor. Nails show no clubbing.   Small;; draining cyst in presternal area   Psychiatric: She has a normal mood and affect. Her behavior is normal. Judgment and thought content normal.       Wt Readings from Last 3 Encounters:   09/14/17 72.9 kg (160 lb 11.5 oz)   09/06/17 73.5 kg (162 lb)   09/06/17 73.5 kg (162 lb)     Temp Readings from Last 3 Encounters:   09/14/17 97.7 °F (36.5 °C) (Oral)   09/07/17 98 °F (36.7 °C) (Oral)   09/06/17 98 °F (36.7 °C)     BP Readings from Last 3  Encounters:   09/14/17 110/63   09/07/17 (!) 175/83   09/06/17 90/60     Pulse Readings from Last 3 Encounters:   09/14/17 83   09/07/17 64   09/06/17 68       Assessment:       1. Diffuse large B-cell lymphoma of intra-abdominal lymph nodes      2-chronic anemia  Plan:       Lab Results   Component Value Date    WBC 7.57 09/14/2017    HGB 10.2 (L) 09/14/2017    HCT 32.7 (L) 09/14/2017    MCV 99 (H) 09/14/2017     09/14/2017     Lab Results   Component Value Date    CREATININE 1.1 09/14/2017     Lab Results   Component Value Date    ALT 88 (H) 09/14/2017    AST 94 (H) 09/14/2017    ALKPHOS 126 09/14/2017    BILITOT 0.2 09/14/2017       Is noticed the LFts are slightly up. Will monitor  There is no evidence of lymphoma activity.  We will see her again in 2 months with a cb, cmp, ferritin and  tibc.  Keep  cardiology appointments. Bacitracin ointment to draining cyst in sternal area

## 2017-09-28 ENCOUNTER — OFFICE VISIT (OUTPATIENT)
Dept: OPHTHALMOLOGY | Facility: CLINIC | Age: 77
End: 2017-09-28
Payer: MEDICARE

## 2017-09-28 DIAGNOSIS — R94.2 DIFFUSION CAPACITY OF LUNG (DL), DECREASED: Chronic | ICD-10-CM

## 2017-09-28 DIAGNOSIS — I25.5 ISCHEMIC CARDIOMYOPATHY: ICD-10-CM

## 2017-09-28 DIAGNOSIS — H52.4 BILATERAL PRESBYOPIA: ICD-10-CM

## 2017-09-28 DIAGNOSIS — F51.01 PRIMARY INSOMNIA: ICD-10-CM

## 2017-09-28 DIAGNOSIS — M79.2 NEUROPATHIC PAIN OF FOOT, RIGHT: ICD-10-CM

## 2017-09-28 DIAGNOSIS — H04.123 DRY EYES, BILATERAL: Primary | ICD-10-CM

## 2017-09-28 DIAGNOSIS — C83.33 DIFFUSE LARGE B-CELL LYMPHOMA OF INTRA-ABDOMINAL LYMPH NODES: ICD-10-CM

## 2017-09-28 DIAGNOSIS — H43.813 PVD (POSTERIOR VITREOUS DETACHMENT), BILATERAL: ICD-10-CM

## 2017-09-28 PROCEDURE — 99999 PR PBB SHADOW E&M-EST. PATIENT-LVL I: CPT | Mod: PBBFAC,,, | Performed by: OPTOMETRIST

## 2017-09-28 PROCEDURE — 92004 COMPRE OPH EXAM NEW PT 1/>: CPT | Mod: S$GLB,,, | Performed by: OPTOMETRIST

## 2017-09-28 PROCEDURE — 92015 DETERMINE REFRACTIVE STATE: CPT | Mod: S$GLB,,, | Performed by: OPTOMETRIST

## 2017-09-28 PROCEDURE — 99211 OFF/OP EST MAY X REQ PHY/QHP: CPT | Mod: PBBFAC | Performed by: OPTOMETRIST

## 2017-09-28 RX ORDER — FUROSEMIDE 20 MG/1
20 TABLET ORAL DAILY
Qty: 30 TABLET | Refills: 11 | Status: SHIPPED | OUTPATIENT
Start: 2017-09-28 | End: 2018-10-08 | Stop reason: SDUPTHER

## 2017-09-28 RX ORDER — MIRTAZAPINE 15 MG/1
15 TABLET, ORALLY DISINTEGRATING ORAL NIGHTLY
Qty: 30 TABLET | Refills: 2 | OUTPATIENT
Start: 2017-09-28 | End: 2018-09-28

## 2017-09-28 RX ORDER — GABAPENTIN 100 MG/1
CAPSULE ORAL
Qty: 90 CAPSULE | Refills: 3 | Status: SHIPPED | OUTPATIENT
Start: 2017-09-28 | End: 2017-12-14

## 2017-09-28 NOTE — PROGRESS NOTES
HPI     Hard to focus at distance when driving x 6 months while taking chemo. New   patient last eye exam 4 years ago. Patient seeing flashes of light right   eye for several months. Update glasses RX.    Last edited by Rosi Pepe on 9/28/2017 10:54 AM. (History)            Assessment /Plan     For exam results, see Encounter Report.    Dry eyes, bilateral    Nuclear cataract, bilateral    PVD (posterior vitreous detachment), bilateral    Bilateral presbyopia      Artificial tears prn qid and hs    Moderate cataracts OU, not surgical    Old PVD OU    Dispense Final Rx for glasses.  RTC 1 year

## 2017-10-06 ENCOUNTER — TELEPHONE (OUTPATIENT)
Dept: HEMATOLOGY/ONCOLOGY | Facility: CLINIC | Age: 77
End: 2017-10-06

## 2017-10-06 NOTE — TELEPHONE ENCOUNTER
----- Message from Silke Bustillos sent at 10/6/2017  9:55 AM CDT -----  Contact: Patient  Patient needs clarification on when she needs to come back for a follow up, please call her back at [phone]. Thank you

## 2017-10-11 ENCOUNTER — TELEPHONE (OUTPATIENT)
Dept: RHEUMATOLOGY | Facility: CLINIC | Age: 77
End: 2017-10-11

## 2017-10-11 NOTE — TELEPHONE ENCOUNTER
Called patient regarding appointment on 2.16.18 at 10 am needing to be rescheduled due to Dr. Rogers being out of clinic that day. Rescheduled appointment to 2/14/19 at 10.30. Pt verbalized understanding.

## 2017-10-24 ENCOUNTER — TELEPHONE (OUTPATIENT)
Dept: RADIOLOGY | Facility: HOSPITAL | Age: 77
End: 2017-10-24

## 2017-10-25 ENCOUNTER — APPOINTMENT (OUTPATIENT)
Dept: RADIOLOGY | Facility: CLINIC | Age: 77
End: 2017-10-25
Attending: NURSE PRACTITIONER
Payer: MEDICARE

## 2017-10-25 DIAGNOSIS — M89.9 BONE DISORDER: ICD-10-CM

## 2017-10-25 PROCEDURE — 77080 DXA BONE DENSITY AXIAL: CPT | Mod: TC

## 2017-10-25 PROCEDURE — 77080 DXA BONE DENSITY AXIAL: CPT | Mod: 26,,, | Performed by: RADIOLOGY

## 2017-11-01 DIAGNOSIS — I25.5 ISCHEMIC CARDIOMYOPATHY: ICD-10-CM

## 2017-11-01 DIAGNOSIS — C85.90 LYMPHOMA, UNSPECIFIED BODY REGION, UNSPECIFIED LYMPHOMA TYPE: ICD-10-CM

## 2017-11-01 DIAGNOSIS — I25.110 CORONARY ARTERY DISEASE INVOLVING NATIVE CORONARY ARTERY OF NATIVE HEART WITH UNSTABLE ANGINA PECTORIS: ICD-10-CM

## 2017-11-01 DIAGNOSIS — Z95.5 S/P CORONARY ARTERY STENT PLACEMENT: ICD-10-CM

## 2017-11-01 DIAGNOSIS — I25.10 CAD, MULTIPLE VESSEL: Chronic | ICD-10-CM

## 2017-11-01 RX ORDER — ISOSORBIDE MONONITRATE 30 MG/1
30 TABLET, EXTENDED RELEASE ORAL NIGHTLY
Qty: 30 TABLET | Refills: 11 | Status: SHIPPED | OUTPATIENT
Start: 2017-11-01 | End: 2018-10-08 | Stop reason: SDUPTHER

## 2017-11-01 RX ORDER — LOSARTAN POTASSIUM 25 MG/1
25 TABLET ORAL DAILY
Qty: 30 TABLET | Refills: 11 | Status: SHIPPED | OUTPATIENT
Start: 2017-11-01 | End: 2019-05-24

## 2017-11-07 ENCOUNTER — LAB VISIT (OUTPATIENT)
Dept: LAB | Facility: HOSPITAL | Age: 77
End: 2017-11-07
Attending: INTERNAL MEDICINE
Payer: MEDICARE

## 2017-11-07 DIAGNOSIS — C83.33 DIFFUSE LARGE B-CELL LYMPHOMA OF INTRA-ABDOMINAL LYMPH NODES: ICD-10-CM

## 2017-11-07 DIAGNOSIS — M1A.09X0 IDIOPATHIC CHRONIC GOUT OF MULTIPLE SITES WITHOUT TOPHUS: ICD-10-CM

## 2017-11-07 LAB
ALBUMIN SERPL BCP-MCNC: 3 G/DL
ALP SERPL-CCNC: 123 U/L
ALT SERPL W/O P-5'-P-CCNC: 88 U/L
ANION GAP SERPL CALC-SCNC: 6 MMOL/L
AST SERPL-CCNC: 89 U/L
BASOPHILS # BLD AUTO: 0.01 K/UL
BASOPHILS NFR BLD: 0.1 %
BILIRUB SERPL-MCNC: 0.2 MG/DL
BUN SERPL-MCNC: 24 MG/DL
CALCIUM SERPL-MCNC: 9 MG/DL
CHLORIDE SERPL-SCNC: 115 MMOL/L
CO2 SERPL-SCNC: 20 MMOL/L
CREAT SERPL-MCNC: 1 MG/DL
DIFFERENTIAL METHOD: ABNORMAL
EOSINOPHIL # BLD AUTO: 0.1 K/UL
EOSINOPHIL NFR BLD: 1.5 %
ERYTHROCYTE [DISTWIDTH] IN BLOOD BY AUTOMATED COUNT: 16.8 %
EST. GFR  (AFRICAN AMERICAN): >60 ML/MIN/1.73 M^2
EST. GFR  (NON AFRICAN AMERICAN): 55 ML/MIN/1.73 M^2
FERRITIN SERPL-MCNC: 174 NG/ML
GLUCOSE SERPL-MCNC: 54 MG/DL
HCT VFR BLD AUTO: 34.5 %
HGB BLD-MCNC: 11 G/DL
IRON SERPL-MCNC: 56 UG/DL
LYMPHOCYTES # BLD AUTO: 4.3 K/UL
LYMPHOCYTES NFR BLD: 55.6 %
MCH RBC QN AUTO: 31.9 PG
MCHC RBC AUTO-ENTMCNC: 31.9 G/DL
MCV RBC AUTO: 100 FL
MONOCYTES # BLD AUTO: 1 K/UL
MONOCYTES NFR BLD: 12.3 %
NEUTROPHILS # BLD AUTO: 2.4 K/UL
NEUTROPHILS NFR BLD: 30.5 %
PLATELET # BLD AUTO: 181 K/UL
PMV BLD AUTO: 9.7 FL
POTASSIUM SERPL-SCNC: 5.1 MMOL/L
PROT SERPL-MCNC: 5.9 G/DL
RBC # BLD AUTO: 3.45 M/UL
SATURATED IRON: 14 %
SODIUM SERPL-SCNC: 141 MMOL/L
TOTAL IRON BINDING CAPACITY: 388 UG/DL
TRANSFERRIN SERPL-MCNC: 262 MG/DL
URATE SERPL-MCNC: 6.3 MG/DL
WBC # BLD AUTO: 7.81 K/UL

## 2017-11-07 PROCEDURE — 36415 COLL VENOUS BLD VENIPUNCTURE: CPT | Mod: PO

## 2017-11-07 PROCEDURE — 82728 ASSAY OF FERRITIN: CPT

## 2017-11-07 PROCEDURE — 83540 ASSAY OF IRON: CPT

## 2017-11-07 PROCEDURE — 80053 COMPREHEN METABOLIC PANEL: CPT

## 2017-11-07 PROCEDURE — 84550 ASSAY OF BLOOD/URIC ACID: CPT

## 2017-11-07 PROCEDURE — 85025 COMPLETE CBC W/AUTO DIFF WBC: CPT

## 2017-11-09 ENCOUNTER — INFUSION (OUTPATIENT)
Dept: INFUSION THERAPY | Facility: HOSPITAL | Age: 77
End: 2017-11-09
Attending: INTERNAL MEDICINE
Payer: MEDICARE

## 2017-11-09 ENCOUNTER — OFFICE VISIT (OUTPATIENT)
Dept: HEMATOLOGY/ONCOLOGY | Facility: CLINIC | Age: 77
End: 2017-11-09
Payer: MEDICARE

## 2017-11-09 VITALS
DIASTOLIC BLOOD PRESSURE: 84 MMHG | BODY MASS INDEX: 26.88 KG/M2 | HEART RATE: 91 BPM | HEIGHT: 64 IN | TEMPERATURE: 98 F | OXYGEN SATURATION: 90 % | WEIGHT: 157.44 LBS | SYSTOLIC BLOOD PRESSURE: 126 MMHG

## 2017-11-09 DIAGNOSIS — C83.33 DIFFUSE LARGE B-CELL LYMPHOMA OF INTRA-ABDOMINAL LYMPH NODES: ICD-10-CM

## 2017-11-09 DIAGNOSIS — C85.83 LARGE CELL LYMPHOMA OF INTRA-ABDOMINAL LYMPH NODES: Primary | ICD-10-CM

## 2017-11-09 DIAGNOSIS — F51.01 PRIMARY INSOMNIA: ICD-10-CM

## 2017-11-09 PROCEDURE — 90662 IIV NO PRSV INCREASED AG IM: CPT | Mod: S$GLB,,, | Performed by: INTERNAL MEDICINE

## 2017-11-09 PROCEDURE — A4216 STERILE WATER/SALINE, 10 ML: HCPCS | Performed by: INTERNAL MEDICINE

## 2017-11-09 PROCEDURE — 25000003 PHARM REV CODE 250: Performed by: INTERNAL MEDICINE

## 2017-11-09 PROCEDURE — 99214 OFFICE O/P EST MOD 30 MIN: CPT | Mod: S$GLB,,, | Performed by: INTERNAL MEDICINE

## 2017-11-09 PROCEDURE — 99499 UNLISTED E&M SERVICE: CPT | Mod: S$GLB,,, | Performed by: INTERNAL MEDICINE

## 2017-11-09 PROCEDURE — 63600175 PHARM REV CODE 636 W HCPCS: Performed by: INTERNAL MEDICINE

## 2017-11-09 PROCEDURE — 99999 PR PBB SHADOW E&M-EST. PATIENT-LVL IV: CPT | Mod: PBBFAC,,, | Performed by: INTERNAL MEDICINE

## 2017-11-09 PROCEDURE — G0009 ADMIN PNEUMOCOCCAL VACCINE: HCPCS | Mod: S$GLB,,, | Performed by: INTERNAL MEDICINE

## 2017-11-09 PROCEDURE — 90732 PPSV23 VACC 2 YRS+ SUBQ/IM: CPT | Mod: S$GLB,,, | Performed by: INTERNAL MEDICINE

## 2017-11-09 PROCEDURE — 96523 IRRIG DRUG DELIVERY DEVICE: CPT

## 2017-11-09 PROCEDURE — G0008 ADMIN INFLUENZA VIRUS VAC: HCPCS | Mod: S$GLB,,, | Performed by: INTERNAL MEDICINE

## 2017-11-09 RX ORDER — SODIUM CHLORIDE 9 MG/ML
10 INJECTION, SOLUTION INTRAMUSCULAR; INTRAVENOUS; SUBCUTANEOUS
Status: DISCONTINUED | OUTPATIENT
Start: 2017-11-09 | End: 2017-11-09 | Stop reason: HOSPADM

## 2017-11-09 RX ORDER — HEPARIN 100 UNIT/ML
500 SYRINGE INTRAVENOUS
Status: DISCONTINUED | OUTPATIENT
Start: 2017-11-09 | End: 2017-11-09 | Stop reason: HOSPADM

## 2017-11-09 RX ORDER — MIRTAZAPINE 15 MG/1
TABLET, ORALLY DISINTEGRATING ORAL
Qty: 30 TABLET | Refills: 2 | Status: SHIPPED | OUTPATIENT
Start: 2017-11-09 | End: 2018-03-08 | Stop reason: SDUPTHER

## 2017-11-09 RX ORDER — SODIUM CHLORIDE 0.9 % (FLUSH) 0.9 %
10 SYRINGE (ML) INJECTION
Status: CANCELLED | OUTPATIENT
Start: 2017-11-09

## 2017-11-09 RX ORDER — HEPARIN 100 UNIT/ML
500 SYRINGE INTRAVENOUS
Status: CANCELLED | OUTPATIENT
Start: 2017-11-09

## 2017-11-09 RX ADMIN — HEPARIN 500 UNITS: 100 SYRINGE at 11:11

## 2017-11-09 RX ADMIN — SODIUM CHLORIDE, PRESERVATIVE FREE 10 ML: 5 INJECTION INTRAVENOUS at 11:11

## 2017-11-09 NOTE — PATIENT INSTRUCTIONS
[facility]  [address]  [address]  [phone] phone     [phone] fax  Hours of Operation: Monday- Friday 8:00am- 5:00pm  After hours phone  [phone]  Hematology / Oncology Physicians on call      Dr. rT Prescott                        Please call with any concerns regarding your appointment today.  FALL PREVENTION   Falls often occur due to slipping, tripping or losing your balance. Here are ways to reduce your risk of falling again.   Was there anything that caused your fall that can be fixed, removed or replaced?   Make your home safe by keeping walkways clear of objects you may trip over.   Use non-slip pads under rugs.   Do not walk in poorly lit areas.   Do not stand on chairs or wobbly ladders.   Use caution when reaching overhead or looking upward. This position can cause a loss of balance.   Be sure your shoes fit properly, have non-slip bottoms and are in good condition.   Be cautious when going up and down stairs, curbs, and when walking on uneven sidewalks.   If your balance is poor, consider using a cane or walker.   If your fall was related to alcohol use, stop or limit alcohol intake.   If your fall was related to use of sleeping medicines, talk to your doctor about this. You may need to reduce your dosage at bedtime if you awaken during the night to go to the bathroom.   To reduce the need for nighttime bathroom trips:   Avoid drinking fluids for several hours before going to bed   Empty your bladder before going to bed   Men can keep a urinal at the bedside   © 3457-9217 [employer], [address] [place] [address], [place] [address]. All rights reserved. This information is not intended as a substitute for professional medical care. Always follow your healthcare professional's instructions.

## 2017-11-10 NOTE — PROGRESS NOTES
Subjective:       Patient ID: Violet Swenson is a 77 y.o. female.    Chief Complaint: No chief complaint on file.    HPI was   admitted to the hospital in early April with diffuse abdominal pain. A CT of   the abdomen done on 04/05/2017 was reported as showing a 6.9 x 7.0 x 8.4 cm soft  tissue mass in the right lower quadrant in the terminal ileum abutting the   cecum. There was adjacent conglomerate adenopathy in the right lower quadrant   mesentery.     The patient had a colonoscopy that showed a lesion in the terminal ileum.     She underwent a right hemicolectomy and terminal ileum removal. 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  She had a Ct/PET that shows evidence of surgery and some non specific findings, but no evidence of activer disease elsewhere.  An ECHO showed normal cardiac ejection fraction, as wella s a MUGA.  She was seen cardiology and cleared for ADRIAMYCIN administration.  She is negative for Hep B/C and HIV  Bone marrow was negative. S    The patient started  R-CHOP. Ttreatmtnt    She tolerated the treatment with some minor difficulties. After 3 cycles, she had a repeat CT/PET  There was no activity in the abdomen.  There was development of ground glass opacities/infiltrates in both lungs which were hypermetabolic although the SUV was not reported.  We discussed the imaging studies with Radiology and Pulmonary.  Dr Corral of the Pulmonary Department favored the findings to be due to pulmonary congestion.  Her troponin is normal, but her BNP is markedly elevated at 1,103.  She was started on Lasix by dr Corral and asked to have a  Ct in few weeks  The patient   had evaluation by Cardiology ( dr Gil).It was felt had developed CHF., with a decrease in her ejection raction to 47%., elevated BNP and imagign studies.  The patient indicated her leg  edema and SOB   improved on lasix. Repeat PET showed clearance of all the infiltrates  Since,  she has had a complete work including cardiac catheterization wirh negative findings.  A repeat CB/PET done  showed perxistence clearance of the previous infiltartes       She comes for follow up. Says tehre are good days and bad days, today is a good one, yesterday wa a bad one. Seems is msotly fatigued relatedf   ALLERGIES: Oxycodone. The chart says that she has allergic to steroids, but   says she is not.     MEDICATIONS: See MedCard.     PREVIOUS SURGERIES: Appendectomy in , tonsillectomy at age 19, gastric   bypass with incidental cholecystectomy, bilateral knee replacement in .     SOCIAL HISTORY: She is . She had two natural children, and one adopted   one. The adopted child has . She lives in [place] with her   . She smoked for two years, averaging a pack a day. She stopped 35   years ago or so. Denies any alcohol intake. She worked in [employer].     FAMILY HISTORY: Father  of colon cancer. Younger brother had leukemia that  transform into a lymphoma. Sister had pancreatic cancer  Review of Systems   Constitutional: Negative.    HENT: Negative.    Eyes: Negative.    Respiratory: Negative.  Negative for cough and wheezing.    Cardiovascular: Negative.  Negative for chest pain.   Gastrointestinal: Negative.    Genitourinary: Negative.    Neurological: Negative.    Psychiatric/Behavioral: Negative.        Objective:      Physical Exam   Constitutional: She is oriented to person, place, and time. She appears well-developed. No distress.   HENT:   Head: Normocephalic.   Right Ear: Tympanic membrane, external ear and ear canal normal.   Left Ear: Tympanic membrane, external ear and ear canal normal.   Nose: Nose normal. Right sinus exhibits no maxillary sinus tenderness and no frontal sinus tenderness. Left sinus exhibits no maxillary sinus tenderness and no frontal sinus tenderness.   Mouth/Throat: Oropharynx is clear and moist and mucous membranes are normal.   Teeth  normal.  Gums normal.   Eyes: Conjunctivae and lids are normal. Pupils are equal, round, and reactive to light.   Neck: Normal carotid pulses, no hepatojugular reflux and no JVD present. Carotid bruit is not present. No tracheal deviation present. No thyroid mass and no thyromegaly present.   Cardiovascular: Normal rate, regular rhythm, S1 normal, S2 normal, normal heart sounds and intact distal pulses.  Exam reveals no gallop and no friction rub.    No murmur heard.  Carotid exam normal   Pulmonary/Chest: Effort normal and breath sounds normal. No accessory muscle usage. No respiratory distress. She has no wheezes. She has no rales. She exhibits no tenderness.   Abdominal: Soft. Normal appearance. She exhibits no distension and no mass. There is no splenomegaly or hepatomegaly. There is no tenderness. There is no rebound and no guarding.   Musculoskeletal: Normal range of motion. She exhibits no edema or tenderness.        Right hand: Normal.        Left hand: Normal.       Lymphadenopathy:     She has no cervical adenopathy.     She has no axillary adenopathy.        Right: No inguinal and no supraclavicular adenopathy present.        Left: No inguinal and no supraclavicular adenopathy present.   Neurological: She is alert and oriented to person, place, and time. She has normal strength. No cranial nerve deficit. Coordination normal.   Skin: Skin is warm and dry. No rash noted. She is not diaphoretic. No cyanosis or erythema. No pallor. Nails show no clubbing.   Psychiatric: She has a normal mood and affect. Her behavior is normal. Judgment and thought content normal.     .  Wt Readings from Last 3 Encounters:   11/09/17 71.4 kg (157 lb 6.5 oz)   09/14/17 72.9 kg (160 lb 11.5 oz)   09/06/17 73.5 kg (162 lb)     Temp Readings from Last 3 Encounters:   11/09/17 98.1 °F (36.7 °C) (Oral)   09/14/17 97.7 °F (36.5 °C) (Oral)   09/07/17 98 °F (36.7 °C) (Oral)     BP Readings from Last 3 Encounters:   11/09/17 126/84    09/14/17 110/63   09/07/17 (!) 175/83     Pulse Readings from Last 3 Encounters:   11/09/17 91   09/14/17 83   09/07/17 64       Assessment:       1. 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

## 2017-11-16 ENCOUNTER — TELEPHONE (OUTPATIENT)
Dept: RHEUMATOLOGY | Facility: CLINIC | Age: 77
End: 2017-11-16

## 2017-11-17 NOTE — TELEPHONE ENCOUNTER
----- Message from Chelsie Layton sent at 11/17/2017  1:09 PM CST -----  Contact: Patient   Patient returned call, Please call her at [phone].    Thanks  td

## 2017-11-17 NOTE — TELEPHONE ENCOUNTER
Dilshad Rogers MD   You 1 hour ago (2:57 PM)     Since her uric acid is still high, she will need 300 mg dose. Thanks.  (Routing comment)

## 2017-11-17 NOTE — TELEPHONE ENCOUNTER
Spoke with pt regarding monthly labs to check uric acid. Will set up lab apt same day when pt is scheduled for port flush at [facility]. Pt states that since she had to stop the chemo she has been taking taking half of the 300 mg allopurinol tablet since Dr. PANIAGUA advised her during her last visit that she needed to increase it due to the chemo. Pt wanted to know if she can take 150mg or if she needs to take the 300 mg. Please Advise.

## 2017-11-22 DIAGNOSIS — M15.9 PRIMARY OSTEOARTHRITIS INVOLVING MULTIPLE JOINTS: ICD-10-CM

## 2017-11-22 RX ORDER — MELOXICAM 7.5 MG/1
TABLET ORAL
Qty: 90 TABLET | Refills: 3 | Status: SHIPPED | OUTPATIENT
Start: 2017-11-22 | End: 2018-10-08 | Stop reason: SDUPTHER

## 2017-11-29 DIAGNOSIS — F41.8 SITUATIONAL ANXIETY: ICD-10-CM

## 2017-11-29 DIAGNOSIS — F41.9 INSOMNIA SECONDARY TO ANXIETY: ICD-10-CM

## 2017-11-29 DIAGNOSIS — F51.05 INSOMNIA SECONDARY TO ANXIETY: ICD-10-CM

## 2017-11-29 DIAGNOSIS — F32.9 MAJOR DEPRESSION, CHRONIC: ICD-10-CM

## 2017-11-30 RX ORDER — SERTRALINE HYDROCHLORIDE 100 MG/1
TABLET, FILM COATED ORAL
Qty: 90 TABLET | Refills: 0 | Status: SHIPPED | OUTPATIENT
Start: 2017-11-30 | End: 2017-12-18 | Stop reason: SDUPTHER

## 2017-12-07 ENCOUNTER — INFUSION (OUTPATIENT)
Dept: INFUSION THERAPY | Facility: HOSPITAL | Age: 77
End: 2017-12-07
Attending: INTERNAL MEDICINE
Payer: MEDICARE

## 2017-12-07 VITALS
BODY MASS INDEX: 26.88 KG/M2 | RESPIRATION RATE: 16 BRPM | DIASTOLIC BLOOD PRESSURE: 80 MMHG | SYSTOLIC BLOOD PRESSURE: 130 MMHG | WEIGHT: 157.44 LBS | HEART RATE: 80 BPM | TEMPERATURE: 98 F | HEIGHT: 64 IN

## 2017-12-07 DIAGNOSIS — C85.83 LARGE CELL LYMPHOMA OF INTRA-ABDOMINAL LYMPH NODES: Primary | ICD-10-CM

## 2017-12-07 DIAGNOSIS — F41.8 SITUATIONAL ANXIETY: ICD-10-CM

## 2017-12-07 PROCEDURE — 96523 IRRIG DRUG DELIVERY DEVICE: CPT

## 2017-12-07 PROCEDURE — A4216 STERILE WATER/SALINE, 10 ML: HCPCS | Performed by: INTERNAL MEDICINE

## 2017-12-07 PROCEDURE — 25000003 PHARM REV CODE 250: Performed by: INTERNAL MEDICINE

## 2017-12-07 PROCEDURE — 63600175 PHARM REV CODE 636 W HCPCS: Performed by: INTERNAL MEDICINE

## 2017-12-07 RX ORDER — SODIUM CHLORIDE 0.9 % (FLUSH) 0.9 %
10 SYRINGE (ML) INJECTION
Status: CANCELLED | OUTPATIENT
Start: 2017-12-07

## 2017-12-07 RX ORDER — ALPRAZOLAM 0.25 MG/1
TABLET ORAL
Qty: 60 TABLET | OUTPATIENT
Start: 2017-12-07

## 2017-12-07 RX ORDER — HEPARIN 100 UNIT/ML
500 SYRINGE INTRAVENOUS
Status: DISCONTINUED | OUTPATIENT
Start: 2017-12-07 | End: 2017-12-07 | Stop reason: HOSPADM

## 2017-12-07 RX ORDER — SODIUM CHLORIDE 9 MG/ML
10 INJECTION, SOLUTION INTRAMUSCULAR; INTRAVENOUS; SUBCUTANEOUS
Status: DISCONTINUED | OUTPATIENT
Start: 2017-12-07 | End: 2017-12-07 | Stop reason: HOSPADM

## 2017-12-07 RX ORDER — HEPARIN 100 UNIT/ML
500 SYRINGE INTRAVENOUS
Status: CANCELLED | OUTPATIENT
Start: 2017-12-07

## 2017-12-07 RX ADMIN — HEPARIN 500 UNITS: 100 SYRINGE at 10:12

## 2017-12-07 RX ADMIN — SODIUM CHLORIDE, PRESERVATIVE FREE 10 ML: 5 INJECTION INTRAVENOUS at 10:12

## 2017-12-07 NOTE — PATIENT INSTRUCTIONS
[facility]  [address]  [address] [address]  [phone] phone     [phone] fax  Hours of Operation: Monday- Friday 8:00am- 5:00pm  After hours phone  [phone]  Hematology / Oncology Physicians on call      Dr. Tr Valdovinos    Please call with any concerns regarding your appointment today.

## 2017-12-08 ENCOUNTER — TELEPHONE (OUTPATIENT)
Dept: HEMATOLOGY/ONCOLOGY | Facility: CLINIC | Age: 77
End: 2017-12-08

## 2017-12-14 ENCOUNTER — LAB VISIT (OUTPATIENT)
Dept: LAB | Facility: HOSPITAL | Age: 77
End: 2017-12-14
Attending: INTERNAL MEDICINE
Payer: MEDICARE

## 2017-12-14 ENCOUNTER — OFFICE VISIT (OUTPATIENT)
Dept: HEMATOLOGY/ONCOLOGY | Facility: CLINIC | Age: 77
End: 2017-12-14
Payer: MEDICARE

## 2017-12-14 VITALS
RESPIRATION RATE: 18 BRPM | WEIGHT: 158.75 LBS | OXYGEN SATURATION: 97 % | HEART RATE: 83 BPM | SYSTOLIC BLOOD PRESSURE: 138 MMHG | BODY MASS INDEX: 27.1 KG/M2 | DIASTOLIC BLOOD PRESSURE: 85 MMHG | HEIGHT: 64 IN | TEMPERATURE: 97 F

## 2017-12-14 DIAGNOSIS — C83.33 DIFFUSE LARGE B-CELL LYMPHOMA OF INTRA-ABDOMINAL LYMPH NODES: ICD-10-CM

## 2017-12-14 DIAGNOSIS — D64.9 CHRONIC ANEMIA: ICD-10-CM

## 2017-12-14 DIAGNOSIS — D53.9 NUTRITIONAL ANEMIA: ICD-10-CM

## 2017-12-14 DIAGNOSIS — C85.83 LARGE CELL LYMPHOMA OF INTRA-ABDOMINAL LYMPH NODES: ICD-10-CM

## 2017-12-14 DIAGNOSIS — G62.9 NEUROPATHY: ICD-10-CM

## 2017-12-14 DIAGNOSIS — D64.9 CHRONIC ANEMIA: Primary | ICD-10-CM

## 2017-12-14 LAB
ALBUMIN SERPL BCP-MCNC: 3.2 G/DL
ALP SERPL-CCNC: 130 U/L
ALT SERPL W/O P-5'-P-CCNC: 96 U/L
ANION GAP SERPL CALC-SCNC: 5 MMOL/L
AST SERPL-CCNC: 112 U/L
BASOPHILS # BLD AUTO: 0.01 K/UL
BASOPHILS NFR BLD: 0.1 %
BILIRUB SERPL-MCNC: 0.4 MG/DL
BUN SERPL-MCNC: 25 MG/DL
CALCIUM SERPL-MCNC: 9.2 MG/DL
CHLORIDE SERPL-SCNC: 115 MMOL/L
CO2 SERPL-SCNC: 23 MMOL/L
CREAT SERPL-MCNC: 1.1 MG/DL
DIFFERENTIAL METHOD: ABNORMAL
EOSINOPHIL # BLD AUTO: 0.2 K/UL
EOSINOPHIL NFR BLD: 2.3 %
ERYTHROCYTE [DISTWIDTH] IN BLOOD BY AUTOMATED COUNT: 17.2 %
EST. GFR  (AFRICAN AMERICAN): 56 ML/MIN/1.73 M^2
EST. GFR  (NON AFRICAN AMERICAN): 49 ML/MIN/1.73 M^2
FOLATE SERPL-MCNC: 15 NG/ML
GLUCOSE SERPL-MCNC: 82 MG/DL
HCT VFR BLD AUTO: 33 %
HGB BLD-MCNC: 10.5 G/DL
LDH SERPL L TO P-CCNC: 314 U/L
LYMPHOCYTES # BLD AUTO: 2.7 K/UL
LYMPHOCYTES NFR BLD: 39.2 %
MCH RBC QN AUTO: 32.2 PG
MCHC RBC AUTO-ENTMCNC: 31.8 G/DL
MCV RBC AUTO: 101 FL
MONOCYTES # BLD AUTO: 0.7 K/UL
MONOCYTES NFR BLD: 9.5 %
NEUTROPHILS # BLD AUTO: 3.4 K/UL
NEUTROPHILS NFR BLD: 48.9 %
PLATELET # BLD AUTO: 189 K/UL
PMV BLD AUTO: 9.6 FL
POTASSIUM SERPL-SCNC: 4.9 MMOL/L
PROT SERPL-MCNC: 6.1 G/DL
RBC # BLD AUTO: 3.26 M/UL
SODIUM SERPL-SCNC: 143 MMOL/L
VIT B12 SERPL-MCNC: >2000 PG/ML
WBC # BLD AUTO: 6.94 K/UL

## 2017-12-14 PROCEDURE — 99215 OFFICE O/P EST HI 40 MIN: CPT | Mod: S$GLB,,, | Performed by: INTERNAL MEDICINE

## 2017-12-14 PROCEDURE — 84165 PROTEIN E-PHORESIS SERUM: CPT

## 2017-12-14 PROCEDURE — 82607 VITAMIN B-12: CPT

## 2017-12-14 PROCEDURE — 99499 UNLISTED E&M SERVICE: CPT | Mod: S$GLB,,, | Performed by: INTERNAL MEDICINE

## 2017-12-14 PROCEDURE — 86334 IMMUNOFIX E-PHORESIS SERUM: CPT | Mod: 26,,, | Performed by: PATHOLOGY

## 2017-12-14 PROCEDURE — 85025 COMPLETE CBC W/AUTO DIFF WBC: CPT

## 2017-12-14 PROCEDURE — 83520 IMMUNOASSAY QUANT NOS NONAB: CPT

## 2017-12-14 PROCEDURE — 83615 LACTATE (LD) (LDH) ENZYME: CPT

## 2017-12-14 PROCEDURE — 82746 ASSAY OF FOLIC ACID SERUM: CPT

## 2017-12-14 PROCEDURE — 80053 COMPREHEN METABOLIC PANEL: CPT

## 2017-12-14 PROCEDURE — 86334 IMMUNOFIX E-PHORESIS SERUM: CPT

## 2017-12-14 PROCEDURE — 36415 COLL VENOUS BLD VENIPUNCTURE: CPT

## 2017-12-14 PROCEDURE — 84165 PROTEIN E-PHORESIS SERUM: CPT | Mod: 26,,, | Performed by: PATHOLOGY

## 2017-12-14 PROCEDURE — 99999 PR PBB SHADOW E&M-EST. PATIENT-LVL III: CPT | Mod: PBBFAC,,, | Performed by: INTERNAL MEDICINE

## 2017-12-14 RX ORDER — GABAPENTIN 100 MG/1
200 CAPSULE ORAL 3 TIMES DAILY
Qty: 180 CAPSULE | Refills: 2 | Status: SHIPPED | OUTPATIENT
Start: 2017-12-14 | End: 2018-03-28 | Stop reason: SDUPTHER

## 2017-12-14 NOTE — PROGRESS NOTES
Hematology/Oncology Office Note    CC:  Lymphoma    Referred by:  No ref. provider found    Diagnosis:  Diffuse large B-cell lymphoma    History of present illness:  77-year-old female followed by Dr. Matson in the hematology/oncology clinic for diffuse large B-cell lymphoma.  She received 3 cycles of R CHOP and developed CHF.  Treatment was discontinued and she was noted to be in remission.  She has been monitored without additional treatment since that time.    She presents today with complaints of bilateral neuropathy to fingers/hands.  She is currently on Neurontin 100 mg 3 times a day however reports that the neuropathy is worsened significantly over the previous 2 months.      Past Medical History:   Diagnosis Date    Arthritis     Cancer     lymphoma    Coronary artery disease     01/2015 LHC patent LCX. 50% stenosis in LAD and RCA.      Depression     Encounter for blood transfusion     GERD (gastroesophageal reflux disease)     Gout, arthritis     Hx of psychiatric care     Hyperlipidemia     Hypertension     Hypothyroidism     Lung nodule 2014    RML--stable    Pacemaker     Metronic    Pneumonia     Psychiatric problem          Social History:      Family History: family history includes COPD in her daughter; Cataracts in her mother; Drug abuse in her son; Heart disease in her mother; Hypertension in her maternal grandfather and mother; Leukemia in her brother; Pancreatic cancer in her sister; Prostate cancer in her mother; Stomach cancer in her father.      HPI  Review of Systems   Constitutional: Positive for activity change and fatigue. Negative for appetite change, fever and unexpected weight change.   HENT: Negative for congestion, drooling, ear discharge, ear pain, facial swelling, mouth sores, nosebleeds, rhinorrhea, sore throat, trouble swallowing and voice change.    Eyes: Negative.    Respiratory: Negative for apnea, cough, choking, chest tightness and shortness of breath.     Cardiovascular: Negative for chest pain, palpitations and leg swelling.   Gastrointestinal: Negative for abdominal distention, abdominal pain, anal bleeding, blood in stool, constipation, diarrhea, nausea and vomiting.   Endocrine: Negative.    Genitourinary: Negative for difficulty urinating, dyspareunia, dysuria, flank pain, frequency, hematuria, pelvic pain and vaginal bleeding.   Musculoskeletal: Positive for arthralgias and myalgias. Negative for back pain, gait problem, joint swelling and neck pain.        Worsening neuropathy to bilateral hands   Skin: Negative for pallor, rash and wound.   Allergic/Immunologic: Negative.    Neurological: Negative for dizziness, tremors, seizures, syncope, facial asymmetry, speech difficulty, weakness, light-headedness, numbness and headaches.        Worsening neuropathy bilateral hands   Hematological: Negative for adenopathy. Does not bruise/bleed easily.   Psychiatric/Behavioral: Negative for agitation, behavioral problems, confusion, dysphoric mood and hallucinations. The patient is not nervous/anxious and is not hyperactive.        Objective:      Physical Exam   Constitutional: She is oriented to person, place, and time. She appears well-developed and well-nourished. No distress.   HENT:   Head: Normocephalic and atraumatic.   Nose: Nose normal.   Mouth/Throat: Oropharynx is clear and moist. No oropharyngeal exudate.   Eyes: Conjunctivae and EOM are normal. Pupils are equal, round, and reactive to light. No scleral icterus.   Neck: Normal range of motion. Neck supple. No JVD present. No tracheal deviation present. No thyromegaly present.   Cardiovascular: Normal rate, regular rhythm, normal heart sounds and intact distal pulses.  Exam reveals no gallop and no friction rub.    No murmur heard.  Pulmonary/Chest: Effort normal and breath sounds normal. She has no wheezes. She has no rales. She exhibits no tenderness.   Abdominal: Soft. Bowel sounds are normal. She  exhibits no distension and no mass. There is no tenderness. There is no rebound and no guarding.   Musculoskeletal: Normal range of motion. She exhibits no edema or tenderness.   Lymphadenopathy:     She has no cervical adenopathy.   Neurological: She is alert and oriented to person, place, and time. No cranial nerve deficit. She exhibits normal muscle tone. Coordination normal.   Skin: Skin is warm and dry. No rash noted. She is not diaphoretic. No erythema. No pallor.   Psychiatric: She has a normal mood and affect. Her behavior is normal. Judgment and thought content normal.       Lab Results   Component Value Date    WBC 7.81 11/07/2017    HGB 11.0 (L) 11/07/2017    HCT 34.5 (L) 11/07/2017     (H) 11/07/2017     11/07/2017     Lab Results   Component Value Date    CREATININE 1.0 11/07/2017    BUN 24 (H) 11/07/2017     11/07/2017    K 5.1 11/07/2017     (H) 11/07/2017    CO2 20 (L) 11/07/2017     Lab Results   Component Value Date    ALT 88 (H) 11/07/2017    AST 89 (H) 11/07/2017    ALKPHOS 123 11/07/2017    BILITOT 0.2 11/07/2017         Assessment:       77-year-old female with a history of diffuse large B-cell lymphoma status post or CHOP who presents today with complaints of worsening bilateral neuropathy to hands.  She is followed by Dr. Matson in the hematology/oncology clinic and I am seeing the patient for the first time today in his absence  She is currently on Neurontin 100 mg by mouth 3 times a day and I have prescribed 200 mg 3 times a day.  In addition I will check B12, TSH, and SPEP.  She will also be referred to the neurology clinic for further evaluation.    Worsening neuropathy:  --Increase Neurontin to 200 mg 3 times a day  --CBC, CMP, B-12/folate, TSH, SPEP  --Refer to neurology clinic    Diffuse large B-cell lymphoma:  --Continue routine surveillance  PET scan scheduled for February 2018--

## 2017-12-15 LAB
ALBUMIN SERPL ELPH-MCNC: 3.32 G/DL
ALPHA1 GLOB SERPL ELPH-MCNC: 0.34 G/DL
ALPHA2 GLOB SERPL ELPH-MCNC: 0.64 G/DL
B-GLOBULIN SERPL ELPH-MCNC: 0.66 G/DL
GAMMA GLOB SERPL ELPH-MCNC: 0.74 G/DL
INTERPRETATION SERPL IFE-IMP: NORMAL
KAPPA LC SER QL IA: 4.67 MG/DL
KAPPA LC/LAMBDA SER IA: 1.21
LAMBDA LC SER QL IA: 3.86 MG/DL
PATHOLOGIST INTERPRETATION IFE: NORMAL
PATHOLOGIST INTERPRETATION SPE: NORMAL
PROT SERPL-MCNC: 5.7 G/DL

## 2017-12-18 ENCOUNTER — OFFICE VISIT (OUTPATIENT)
Dept: INTERNAL MEDICINE | Facility: CLINIC | Age: 77
End: 2017-12-18
Payer: MEDICARE

## 2017-12-18 ENCOUNTER — LAB VISIT (OUTPATIENT)
Dept: LAB | Facility: HOSPITAL | Age: 77
End: 2017-12-18
Attending: FAMILY MEDICINE
Payer: MEDICARE

## 2017-12-18 VITALS
SYSTOLIC BLOOD PRESSURE: 130 MMHG | DIASTOLIC BLOOD PRESSURE: 80 MMHG | RESPIRATION RATE: 16 BRPM | OXYGEN SATURATION: 90 % | HEART RATE: 78 BPM | TEMPERATURE: 97 F | HEIGHT: 64 IN

## 2017-12-18 DIAGNOSIS — M85.80 OSTEOPENIA, UNSPECIFIED LOCATION: ICD-10-CM

## 2017-12-18 DIAGNOSIS — C83.33 DIFFUSE LARGE B-CELL LYMPHOMA OF INTRA-ABDOMINAL LYMPH NODES: ICD-10-CM

## 2017-12-18 DIAGNOSIS — I10 ESSENTIAL HYPERTENSION: Chronic | ICD-10-CM

## 2017-12-18 DIAGNOSIS — R79.89 ELEVATED LFTS: ICD-10-CM

## 2017-12-18 DIAGNOSIS — Z00.00 ROUTINE GENERAL MEDICAL EXAMINATION AT A HEALTH CARE FACILITY: Primary | ICD-10-CM

## 2017-12-18 DIAGNOSIS — E03.9 HYPOTHYROIDISM (ACQUIRED): ICD-10-CM

## 2017-12-18 DIAGNOSIS — I25.10 CORONARY ARTERY DISEASE INVOLVING NATIVE CORONARY ARTERY OF NATIVE HEART WITHOUT ANGINA PECTORIS: ICD-10-CM

## 2017-12-18 DIAGNOSIS — F41.9 INSOMNIA SECONDARY TO ANXIETY: ICD-10-CM

## 2017-12-18 DIAGNOSIS — R79.81 LOW O2 SATURATION: ICD-10-CM

## 2017-12-18 DIAGNOSIS — F32.9 MAJOR DEPRESSION, CHRONIC: ICD-10-CM

## 2017-12-18 DIAGNOSIS — E78.5 HYPERLIPIDEMIA, UNSPECIFIED HYPERLIPIDEMIA TYPE: Chronic | ICD-10-CM

## 2017-12-18 DIAGNOSIS — F51.05 INSOMNIA SECONDARY TO ANXIETY: ICD-10-CM

## 2017-12-18 DIAGNOSIS — J30.2 SEASONAL ALLERGIC RHINITIS, UNSPECIFIED CHRONICITY, UNSPECIFIED TRIGGER: ICD-10-CM

## 2017-12-18 DIAGNOSIS — F41.8 SITUATIONAL ANXIETY: ICD-10-CM

## 2017-12-18 DIAGNOSIS — I50.9 CONGESTIVE HEART FAILURE, UNSPECIFIED CONGESTIVE HEART FAILURE CHRONICITY, UNSPECIFIED CONGESTIVE HEART FAILURE TYPE: ICD-10-CM

## 2017-12-18 LAB
ALT SERPL W/O P-5'-P-CCNC: 85 U/L
AST SERPL-CCNC: 89 U/L
T4 FREE SERPL-MCNC: 0.74 NG/DL
TSH SERPL DL<=0.005 MIU/L-ACNC: 2.94 UIU/ML

## 2017-12-18 PROCEDURE — 84439 ASSAY OF FREE THYROXINE: CPT

## 2017-12-18 PROCEDURE — 84460 ALANINE AMINO (ALT) (SGPT): CPT

## 2017-12-18 PROCEDURE — 99397 PER PM REEVAL EST PAT 65+ YR: CPT | Mod: S$GLB,,, | Performed by: FAMILY MEDICINE

## 2017-12-18 PROCEDURE — 99499 UNLISTED E&M SERVICE: CPT | Mod: S$GLB,,, | Performed by: FAMILY MEDICINE

## 2017-12-18 PROCEDURE — 99999 PR PBB SHADOW E&M-EST. PATIENT-LVL III: CPT | Mod: PBBFAC,,, | Performed by: FAMILY MEDICINE

## 2017-12-18 PROCEDURE — 36415 COLL VENOUS BLD VENIPUNCTURE: CPT

## 2017-12-18 PROCEDURE — 84450 TRANSFERASE (AST) (SGOT): CPT

## 2017-12-18 PROCEDURE — 84443 ASSAY THYROID STIM HORMONE: CPT

## 2017-12-18 RX ORDER — SERTRALINE HYDROCHLORIDE 100 MG/1
150 TABLET, FILM COATED ORAL DAILY
Qty: 90 TABLET | Refills: 1 | Status: SHIPPED | OUTPATIENT
Start: 2017-12-18 | End: 2018-04-04 | Stop reason: SDUPTHER

## 2017-12-18 RX ORDER — FLUTICASONE PROPIONATE 50 MCG
2 SPRAY, SUSPENSION (ML) NASAL DAILY
Qty: 16 G | Refills: 11 | Status: SHIPPED | OUTPATIENT
Start: 2017-12-18 | End: 2019-07-17 | Stop reason: SDUPTHER

## 2017-12-18 RX ORDER — LORATADINE 10 MG/1
10 TABLET ORAL DAILY
Refills: 0 | COMMUNITY
Start: 2017-12-18 | End: 2019-08-14

## 2017-12-18 RX ORDER — ALPRAZOLAM 0.25 MG/1
0.25 TABLET ORAL 2 TIMES DAILY PRN
Qty: 60 TABLET | Refills: 5 | Status: SHIPPED | OUTPATIENT
Start: 2017-12-18 | End: 2018-06-18 | Stop reason: SDUPTHER

## 2017-12-18 RX ORDER — LEVOTHYROXINE SODIUM 75 UG/1
TABLET ORAL
Qty: 90 TABLET | Refills: 1 | Status: SHIPPED | OUTPATIENT
Start: 2017-12-18 | End: 2018-06-18 | Stop reason: SDUPTHER

## 2017-12-18 NOTE — PROGRESS NOTES
"Subjective:       Patient ID: Violet Swenson is a 77 y.o. female.    Chief Complaint: Annual Exam    Patient presents to clinic today for annual physical exam.      Review of Systems   Constitutional: Positive for chills ("weather") and fatigue. Negative for fever and unexpected weight change.   HENT: Positive for dental problem and rhinorrhea (requests Rx for flonase). Negative for congestion, ear pain, hearing loss and trouble swallowing.    Eyes: Negative for pain and visual disturbance.   Respiratory: Positive for shortness of breath (with exertion, chronic, stable). Negative for cough.    Cardiovascular: Negative for chest pain, palpitations and leg swelling.   Gastrointestinal: Negative for abdominal distention, abdominal pain, blood in stool, constipation, diarrhea, nausea and vomiting.   Genitourinary: Negative for difficulty urinating and vaginal discharge.   Musculoskeletal: Positive for arthralgias (arthritis) and myalgias.   Skin: Negative for rash.   Neurological: Positive for weakness (generalized) and numbness (finger tips). Negative for dizziness and headaches.   Hematological: Negative for adenopathy. Bruises/bleeds easily (on plavix and aspirin).   Psychiatric/Behavioral: Positive for dysphoric mood and sleep disturbance. The patient is nervous/anxious.         "worry about health and family"; her son has cancer as well       Objective:      Physical Exam   Constitutional: She is oriented to person, place, and time. She appears well-developed and well-nourished. No distress.   HENT:   Head: Normocephalic and atraumatic.   Eyes: Conjunctivae and EOM are normal. Pupils are equal, round, and reactive to light. No scleral icterus.   Cardiovascular: Normal rate and regular rhythm.  Exam reveals no gallop and no friction rub.    No murmur heard.  Pulmonary/Chest: Effort normal and breath sounds normal.   Neurological: She is alert and oriented to person, place, and time. No cranial nerve " deficit. Gait normal.   Psychiatric: She has a normal mood and affect.   Vitals reviewed.      Assessment:       1. Routine general medical examination at a health care facility    2. Major depression, chronic    3. Situational anxiety    4. Insomnia secondary to anxiety    5. Hypothyroidism (acquired)    6. Seasonal allergic rhinitis, unspecified chronicity, unspecified trigger    7. Elevated LFTs    8. Low O2 saturation    9. Osteopenia, unspecified location    10. Essential hypertension    11. Hyperlipidemia, unspecified hyperlipidemia type    12. Coronary artery disease involving native coronary artery of native heart without angina pectoris    13. Congestive heart failure, unspecified congestive heart failure chronicity, unspecified congestive heart failure type    14. Diffuse large B-cell lymphoma of intra-abdominal lymph nodes        Plan:     Problem List Items Addressed This Visit     Essential hypertension (Chronic)    Current Assessment & Plan     Controlled, continue current meds         Hyperlipidemia (Chronic)    Current Assessment & Plan     Followed by Cardiology         Hypothyroidism (acquired) (Chronic)    Current Assessment & Plan     Status pending labs, continue levothyroxine           Relevant Medications    levothyroxine (SYNTHROID) 75 MCG tablet    Other Relevant Orders    TSH (Completed)    T4, free (Completed)    Major depression, chronic    Current Assessment & Plan     Continue zoloft, encouraged counseling         Relevant Medications    sertraline (ZOLOFT) 100 MG tablet    Coronary artery disease involving native coronary artery of native heart without angina pectoris    Overview     Followed by Cardiology, Dr. Gil         Diffuse large B cell lymphoma    Overview     Followed by Dr. Prescott, Hem/Onc         CHF (congestive heart failure)    Current Assessment & Plan     Followed by Cardiology, Dr. Gil           Situational anxiety    Current Assessment & Plan     Continue zoloft  and xanax; encouraged counseling         Relevant Medications    sertraline (ZOLOFT) 100 MG tablet    ALPRAZolam (XANAX) 0.25 MG tablet    Insomnia secondary to anxiety    Relevant Medications    sertraline (ZOLOFT) 100 MG tablet    Seasonal allergic rhinitis    Relevant Medications    fluticasone (FLONASE) 50 mcg/actuation nasal spray    loratadine (CLARITIN) 10 mg tablet    Elevated LFTs    Current Assessment & Plan     Recheck, will check with Hem/Onc to see if this may be expected with her treatments         Relevant Orders    ALT (SGPT) (Completed)    AST (SGOT) (Completed)    Osteopenia    Current Assessment & Plan     She will discuss with Dr. Rogers in February.           Other Visit Diagnoses     Routine general medical examination at a health care facility    -  Primary    Low O2 saturation        90%, she is asymptomatic, so likely due to her fingers being cool, which she reports is chronic, she will seek medical attention for symptoms          Health Maintenance reviewed/updated.

## 2017-12-19 DIAGNOSIS — F41.8 SITUATIONAL ANXIETY: ICD-10-CM

## 2017-12-19 RX ORDER — ALPRAZOLAM 0.25 MG/1
0.25 TABLET ORAL 2 TIMES DAILY PRN
Qty: 60 TABLET | Refills: 5 | OUTPATIENT
Start: 2017-12-19 | End: 2018-01-18

## 2017-12-19 NOTE — TELEPHONE ENCOUNTER
----- Message from Nader Gil MD sent at 12/18/2017  1:09 PM CST -----  Regarding: RE: O2 sat; elevated LFTs  u may want to do an abg or warm her hands and repeat her saturation.  ----- Message -----  From: Marti Coronel MD  Sent: 12/18/2017  11:55 AM  To: Nader Gil MD  Subject: O2 sat; elevated LFTs                            Hi Dr. Gil,    I saw patient today for routine visit. Her O2 sat was 90%, she was doing fine clinically, lungs clear, her fingers stay cool per her report. I just wanted you to be aware. Also, she has elevated LFTs, I assume this may be a chemo side effect. I've messaged Dr. Matson regarding that and am rechecking them today. If you have any recommendations regarding any of the above, please let me know.    Thanks,  Marti

## 2017-12-19 NOTE — TELEPHONE ENCOUNTER
Please see message below as well.    I sent xanax yesterday to [facility], please contact pharmacy. Thank you.

## 2017-12-19 NOTE — TELEPHONE ENCOUNTER
Please contact patient to see if she can come in to have O2 sat repeated today. If still reading low, we may want to check ABG. Please let me know if she has any questions.

## 2017-12-22 PROBLEM — J30.2 SEASONAL ALLERGIC RHINITIS: Status: ACTIVE | Noted: 2017-12-22

## 2017-12-22 PROBLEM — F51.05 INSOMNIA SECONDARY TO ANXIETY: Status: ACTIVE | Noted: 2017-12-22

## 2017-12-22 PROBLEM — F41.8 SITUATIONAL ANXIETY: Status: ACTIVE | Noted: 2017-12-22

## 2017-12-22 PROBLEM — R79.89 ELEVATED LFTS: Status: ACTIVE | Noted: 2017-12-22

## 2017-12-22 PROBLEM — F41.9 INSOMNIA SECONDARY TO ANXIETY: Status: ACTIVE | Noted: 2017-12-22

## 2017-12-22 PROBLEM — M85.80 OSTEOPENIA: Status: ACTIVE | Noted: 2017-12-22

## 2018-01-04 ENCOUNTER — CLINICAL SUPPORT (OUTPATIENT)
Dept: CARDIOLOGY | Facility: CLINIC | Age: 78
End: 2018-01-04
Payer: MEDICARE

## 2018-01-04 DIAGNOSIS — Z95.0 CARDIAC PACEMAKER IN SITU: ICD-10-CM

## 2018-01-04 DIAGNOSIS — I44.2 CHB (COMPLETE HEART BLOCK): ICD-10-CM

## 2018-01-04 PROCEDURE — 93280 PM DEVICE PROGR EVAL DUAL: CPT | Mod: S$GLB,,, | Performed by: INTERNAL MEDICINE

## 2018-01-09 ENCOUNTER — INFUSION (OUTPATIENT)
Dept: INFUSION THERAPY | Facility: HOSPITAL | Age: 78
End: 2018-01-09
Attending: INTERNAL MEDICINE
Payer: MEDICARE

## 2018-01-09 VITALS
OXYGEN SATURATION: 98 % | SYSTOLIC BLOOD PRESSURE: 142 MMHG | DIASTOLIC BLOOD PRESSURE: 84 MMHG | HEART RATE: 87 BPM | TEMPERATURE: 98 F | RESPIRATION RATE: 18 BRPM

## 2018-01-09 DIAGNOSIS — C85.83 LARGE CELL LYMPHOMA OF INTRA-ABDOMINAL LYMPH NODES: Primary | ICD-10-CM

## 2018-01-09 PROCEDURE — 96523 IRRIG DRUG DELIVERY DEVICE: CPT

## 2018-01-09 PROCEDURE — 25000003 PHARM REV CODE 250: Performed by: INTERNAL MEDICINE

## 2018-01-09 PROCEDURE — 63600175 PHARM REV CODE 636 W HCPCS: Performed by: INTERNAL MEDICINE

## 2018-01-09 PROCEDURE — A4216 STERILE WATER/SALINE, 10 ML: HCPCS | Performed by: INTERNAL MEDICINE

## 2018-01-09 RX ORDER — HEPARIN 100 UNIT/ML
500 SYRINGE INTRAVENOUS
Status: CANCELLED | OUTPATIENT
Start: 2018-01-09

## 2018-01-09 RX ORDER — SODIUM CHLORIDE 0.9 % (FLUSH) 0.9 %
10 SYRINGE (ML) INJECTION
Status: CANCELLED | OUTPATIENT
Start: 2018-01-09

## 2018-01-09 RX ORDER — SODIUM CHLORIDE 9 MG/ML
10 INJECTION, SOLUTION INTRAMUSCULAR; INTRAVENOUS; SUBCUTANEOUS
Status: DISCONTINUED | OUTPATIENT
Start: 2018-01-09 | End: 2018-01-09 | Stop reason: HOSPADM

## 2018-01-09 RX ORDER — HEPARIN 100 UNIT/ML
500 SYRINGE INTRAVENOUS
Status: DISCONTINUED | OUTPATIENT
Start: 2018-01-09 | End: 2018-01-09 | Stop reason: HOSPADM

## 2018-01-09 RX ADMIN — SODIUM CHLORIDE, PRESERVATIVE FREE 10 ML: 5 INJECTION INTRAVENOUS at 10:01

## 2018-01-09 RX ADMIN — HEPARIN 500 UNITS: 100 SYRINGE at 10:01

## 2018-01-09 NOTE — PATIENT INSTRUCTIONS
[facility]  [address]  [address] [address]  [phone] phone     [phone] fax  Hours of Operation: Monday- Friday 8:00am- 5:00pm  After hours phone  [phone]  Hematology / Oncology Physicians on call      Dr. Tr Valdovinos    Please call with any concerns regarding your appointment today.

## 2018-01-10 ENCOUNTER — OFFICE VISIT (OUTPATIENT)
Dept: OPHTHALMOLOGY | Facility: CLINIC | Age: 78
End: 2018-01-10
Payer: MEDICARE

## 2018-01-10 DIAGNOSIS — H50.00 ESOTROPIA: Primary | ICD-10-CM

## 2018-01-10 DIAGNOSIS — I25.10 CORONARY ARTERY DISEASE WITHOUT ANGINA PECTORIS, UNSPECIFIED VESSEL OR LESION TYPE, UNSPECIFIED WHETHER NATIVE OR TRANSPLANTED HEART: ICD-10-CM

## 2018-01-10 PROCEDURE — 99999 PR PBB SHADOW E&M-EST. PATIENT-LVL I: CPT | Mod: PBBFAC,,, | Performed by: OPTOMETRIST

## 2018-01-10 PROCEDURE — 92012 INTRM OPH EXAM EST PATIENT: CPT | Mod: S$GLB,,, | Performed by: OPTOMETRIST

## 2018-01-10 RX ORDER — CLOPIDOGREL BISULFATE 75 MG/1
TABLET ORAL
Qty: 90 TABLET | Refills: 3 | Status: SHIPPED | OUTPATIENT
Start: 2018-01-10 | End: 2019-01-11 | Stop reason: SDUPTHER

## 2018-01-10 NOTE — PROGRESS NOTES
HPI     Double vision x a few months when driving or watching television since   being DX with cancer. Patient noticed change in vision while on chemo.   Last eye exam 09/28/2017.    Last edited by Guy Mireles, OD on 1/10/2018  9:18 AM. (History)            Assessment /Plan     For exam results, see Encounter Report.    Esotropia      6 prism diopters esotropia.    Correct with 4 prism diopters ODETTE prism split.    RTC prn

## 2018-01-12 ENCOUNTER — TELEPHONE (OUTPATIENT)
Dept: CARDIOLOGY | Facility: CLINIC | Age: 78
End: 2018-01-12

## 2018-01-19 DIAGNOSIS — M1A.09X0 IDIOPATHIC CHRONIC GOUT OF MULTIPLE SITES WITHOUT TOPHUS: ICD-10-CM

## 2018-01-19 RX ORDER — ALLOPURINOL 300 MG/1
TABLET ORAL
Qty: 90 TABLET | Refills: 3 | Status: SHIPPED | OUTPATIENT
Start: 2018-01-19 | End: 2018-02-14 | Stop reason: SDUPTHER

## 2018-01-23 ENCOUNTER — OFFICE VISIT (OUTPATIENT)
Dept: NEUROLOGY | Facility: CLINIC | Age: 78
End: 2018-01-23
Payer: MEDICARE

## 2018-01-23 VITALS
DIASTOLIC BLOOD PRESSURE: 78 MMHG | SYSTOLIC BLOOD PRESSURE: 120 MMHG | HEIGHT: 64 IN | HEART RATE: 60 BPM | BODY MASS INDEX: 26.69 KG/M2 | WEIGHT: 156.31 LBS

## 2018-01-23 DIAGNOSIS — G62.0 DRUG-INDUCED POLYNEUROPATHY: ICD-10-CM

## 2018-01-23 PROCEDURE — 99499 UNLISTED E&M SERVICE: CPT | Mod: S$GLB,,, | Performed by: PSYCHIATRY & NEUROLOGY

## 2018-01-23 PROCEDURE — 99204 OFFICE O/P NEW MOD 45 MIN: CPT | Mod: S$GLB,,, | Performed by: PSYCHIATRY & NEUROLOGY

## 2018-01-23 PROCEDURE — 99999 PR PBB SHADOW E&M-EST. PATIENT-LVL III: CPT | Mod: PBBFAC,,, | Performed by: PSYCHIATRY & NEUROLOGY

## 2018-01-23 NOTE — LETTER
January 23, 2018      Jose A Prescott Jr., MD  [address] [address]           [facility]  [address] [place] [id number]  Phone: [phone]  Fax: [phone]          Patient: Violet Swenson   MR Number: [id number]   YOB: 1940   Date of Visit: 1/23/2018       Dear Dr. Jose A Prescott Jr.:    Thank you for referring Violet Swenson to me for evaluation. Attached you will find relevant portions of my assessment and plan of care.    If you have questions, please do not hesitate to call me. I look forward to following Violet Swenson along with you.    Sincerely,    Gregor Prieto MD    Enclosure  CC:  No Recipients    If you would like to receive this communication electronically, please contact [email or web address] or [phone] to request more information on [facility] Link access.    For providers and/or their staff who would like to refer a patient to [facility], please contact us through our one-stop-shop provider referral line, [facility], at [phone].    If you feel you have received this communication in error or would no longer like to receive these types of communications, please e-mail [email or web address]

## 2018-01-23 NOTE — PROGRESS NOTES
This is a 77-year-old right-handed patient who indicates that she has developed a sensation of numbness and tingling in all the toes of both feet as well as the tips of all fingers of both hands.  The patient dates the onset of symptoms to the time when she had received chemotherapy for her lymphoma.  In addition, the patient is also had a burning and tingling sensation that radiates from the right knee down the lateral side of the right leg towards the ankle.  That area of abnormality developed after she had had a right total knee arthroplasty.    In addition, she is also had intermittent sharp pain that she feels in her neck that radiates to the back of the head.  This intermittent sharp pain can be precipitated by turning the chin either to the right or to the left.  The pain last several seconds but has lasted several minutes at a time.  The pain does not radiate into the shoulder or into the arms on either side.  There is no radiation of pain into the intrascapular region.    In addition to the sensation of numbness in the fingertips, the patient feels that her  strength is not as strong as it had been in either hand.  She reports dropping objects from her hands intermittently.  She denies any nocturnal awakening due to numbness, tingling, or other paresthesia.  She denies any pain in the forearm.  The patient states that during the day, she is not aware of worsening of symptoms when the hands are held in static positions.      ROS:  GENERAL: No fever, chills, or night sweats.  SKIN: No rashes, itching or changes in color or texture of skin.  HEAD: No headaches or recent head trauma.  EYES: Visual acuity fine. No photophobia, ocular pain or diplopia.  EARS: Denies ear pain, discharge or vertigo.  NOSE: No loss of smell, no epistaxis or postnasal drip.  MOUTH & THROAT: No hoarseness or change in voice. No excessive gum bleeding.  NODES: Denies swollen glands.  CHEST: Denies ESCOBAR, cyanosis, wheezing, cough and  sputum production.  CARDIOVASCULAR: Denies chest pain, PND, orthopnea or reduced exercise tolerance.  ABDOMEN: Appetite fine. No weight loss. Denies diarrhea, abdominal pain, hematemesis or blood in stool.  URINARY: No flank pain, dysuria or hematuria.  PERIPHERAL VASCULAR: No claudication or cyanosis.  MUSCULOSKELETAL: No joint stiffness or swelling. Denies back pain.  NEUROLOGIC: No history of seizures, paralysis, or unexplained loss of consciousness    PAST HISTORY:  Surgery: Gastric bypass in , tonsillectomy, appendectomy, partial colectomy for lymphoma mass, bilateral total knee arthroplasty, implanted pacemaker, coronary artery stent placement, coronary angioplasty  Medical: Lymphoma, one area artery disease, hyperlipidemia, hypertension, hypothyroidism  ALLERGIES: Oxycodone, and corticosteroids cause abdominal pain and nausea    FAMILY HISTORY:  The patient's mother  at age 63 with a lymphoma.  Her sister  with pancreatic cancer.  Her father also had some type of abdominal malignancy.  Her mother congestive heart failure in the complications of hypertension.  There is no known family history of neuropathy of any type.    SOCIAL HISTORY:  The patient is .  She is a retired .  She is a former smoker.  She denies alcohol use.    PE:   VITAL SIGNS: Blood pressure 120/78, pulse 68, weight 70.9 kg, height 5 foot 4 inches, BMI 26.83  APPEARANCE: Well nourished, well developed, in no acute distress.    HEAD: Normocephalic, atraumatic.  EYES: PERRL. EOMI.  Non-icteric sclerae.    EARS: TM's intact. Light reflex normal. No retraction or perforation.    NOSE: Mucosa pink. Airway clear.  MOUTH & THROAT: No tonsillar enlargement. No pharyngeal erythema or exudate. No stridor.  NECK: Supple. No bruits.  CHEST: Lungs clear to auscultation.  CARDIOVASCULAR: Regular rhythm without significant murmurs.  ABDOMEN: Bowel sounds normal. Not distended.  MUSCULOSKELETAL:  No bony deformity seen.  Muscle  tone and muscle mass are normal in both upper and both lower extremities.  NEUROLOGIC:   Mental Status:  The patient is well oriented to person, time, place, and situation.  The patient is attentive to the environment and cooperative for the exam.  Cranial Nerves: II-XII grossly intact. Fundoscopic exam is normal.  No hemorrhage, exudate or papilledema is present. The extraocular muscles are intact in the cardinal directions of gaze.  No ptosis is present. Facial features are symmetrical.  Speech is normal in fluency, diction, and phrasing.  Tongue protrudes in the midline.    Gait and Station:  Romberg is negative.  The patient is ambulating with a rolling walker in the hallway but can walk without the walker in the exam room.  Good alternate armswing with normal gait.  Motor:  No downdrift of either arm when held at shoulder level.  Manual muscle testing of proximal and distal muscles of both upper and lower extremities is normal.  I could not identify any focal or lateralized motor weakness with manual testing of either upper or either lower extremity.  The patient is able to oppose the thumb to the fifth finger with grade 5/5 strength bilaterally.  Dorsal interosseous function is 5/5 bilaterally.  In the lower extremity, all muscle groups are graded 5/5 without evidence of atrophy or fasciculations.  Sensory:  The patient reports diminished appreciation of pinprick over the pads of all 5 fingers of both hands.  On the dorsum of the hand however pinprick sensation is intact.  Monofilament testing is impaired over the fingertips of all 5 fingers bilaterally but is intact on the dorsum of the hand.  She also has diminished appreciation of pinprick in the lateral side of the right lower leg in the distribution of the superficial peroneal nerve.  Cerebellar:  Finger to nose done well.  Alternating movements intact.  No involuntary movements or tremor seen.  Reflexes:  Stretch reflexes are trace both upper and lower  extremities.  Plantar stimulation is flexor bilaterally and no pathological reflexes are seen      ASSESSMENT:  1.  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  Treatment lymphoma, currently in remission after chemotherapy  4.  Coronary artery disease and history of hypertension, hyperlipidemia    RECOMMENDATIONS:  1.  Generally speaking, gabapentin will not be of any benefit in treating the symptoms of numbness but could be of some benefit if the neuropathy becomes painful and is described as a burning or tingling type of neuropathy.  The patient is of the opinion that the gabapentin has definitely helped superficial peroneal neuropathy.  2.  Diagnosis of small fiber peripheral neuropathy is difficult and would repeat probably require a punch skin biopsy which is not currently available at this institution.  However on not certain that this would change the method of treatment even if that procedure was performed.  Treatment would be purely symptomatic at this time.  3.  Return to neurology as needed.    This was a 55 minute visit with the patient with over 50% of the time spent counseling the patient regarding the diagnosis and treatment of different forms of neuropathy.      This note is generated with speech recognition software and is subject to transcription error and sound alike phrases that may be missed by proofreading.

## 2018-01-25 ENCOUNTER — OFFICE VISIT (OUTPATIENT)
Dept: CARDIOLOGY | Facility: CLINIC | Age: 78
End: 2018-01-25
Payer: MEDICARE

## 2018-01-25 VITALS
WEIGHT: 157.19 LBS | SYSTOLIC BLOOD PRESSURE: 114 MMHG | HEART RATE: 80 BPM | BODY MASS INDEX: 26.98 KG/M2 | DIASTOLIC BLOOD PRESSURE: 68 MMHG

## 2018-01-25 DIAGNOSIS — Z95.5 S/P CORONARY ARTERY STENT PLACEMENT: ICD-10-CM

## 2018-01-25 DIAGNOSIS — I25.10 CAD, MULTIPLE VESSEL: Primary | Chronic | ICD-10-CM

## 2018-01-25 DIAGNOSIS — I50.9 CONGESTIVE HEART FAILURE, UNSPECIFIED CONGESTIVE HEART FAILURE CHRONICITY, UNSPECIFIED CONGESTIVE HEART FAILURE TYPE: ICD-10-CM

## 2018-01-25 DIAGNOSIS — Z95.0 PACEMAKER: ICD-10-CM

## 2018-01-25 DIAGNOSIS — I25.5 ISCHEMIC CARDIOMYOPATHY: ICD-10-CM

## 2018-01-25 DIAGNOSIS — I10 ESSENTIAL HYPERTENSION: Chronic | ICD-10-CM

## 2018-01-25 DIAGNOSIS — E78.5 HYPERLIPIDEMIA, UNSPECIFIED HYPERLIPIDEMIA TYPE: Chronic | ICD-10-CM

## 2018-01-25 DIAGNOSIS — R94.2 DIFFUSION CAPACITY OF LUNG (DL), DECREASED: Chronic | ICD-10-CM

## 2018-01-25 DIAGNOSIS — G62.0 DRUG-INDUCED POLYNEUROPATHY: ICD-10-CM

## 2018-01-25 DIAGNOSIS — C83.33 DIFFUSE LARGE B-CELL LYMPHOMA OF INTRA-ABDOMINAL LYMPH NODES: ICD-10-CM

## 2018-01-25 DIAGNOSIS — I25.10 CORONARY ARTERY DISEASE INVOLVING NATIVE CORONARY ARTERY OF NATIVE HEART WITHOUT ANGINA PECTORIS: ICD-10-CM

## 2018-01-25 PROCEDURE — 99214 OFFICE O/P EST MOD 30 MIN: CPT | Mod: S$GLB,,, | Performed by: INTERNAL MEDICINE

## 2018-01-25 PROCEDURE — 99499 UNLISTED E&M SERVICE: CPT | Mod: S$GLB,,, | Performed by: INTERNAL MEDICINE

## 2018-01-25 PROCEDURE — 99999 PR PBB SHADOW E&M-EST. PATIENT-LVL III: CPT | Mod: PBBFAC,,, | Performed by: INTERNAL MEDICINE

## 2018-01-25 NOTE — PROGRESS NOTES
"Subjective:   Patient ID:  Violet Swenson is a 77 y.o. female who presents for follow up of Coronary Artery Disease; Congestive Heart Failure; Hyperlipidemia; and Hypertension      HPI  A 78 yo female with h/o cad s/p stent s/p pacer an episode of chf after chemotherapy . Had an episode of afib since her interrogation that occurred in august when she decompensated. Has no palpitation. She is awaiting her pet scan has no chest pain or chf symptoms or palpitation.her interval pacer interrogation over the past 2-3 weeks. Showed no afib.  Past Medical History:   Diagnosis Date    Arthritis     Cancer     lymphoma    Coronary artery disease     01/2015 LHC patent LCX. 50% stenosis in LAD and RCA.      Depression     Encounter for blood transfusion     GERD (gastroesophageal reflux disease)     Gout, arthritis     Hx of psychiatric care     Hyperlipidemia     Hypertension     Hypothyroidism     Lung nodule 2014    RML--stable    Pacemaker     Metronic    Pneumonia     Psychiatric problem        Past Surgical History:   Procedure Laterality Date    APPENDECTOMY      CARDIAC PACEMAKER PLACEMENT  01/22/2015    CHOLECYSTECTOMY      COLONOSCOPY N/A 4/6/2017    Procedure: COLONOSCOPY;  Surgeon: Tye Enamorado MD;  Location[facility];  Service: Endoscopy;  Laterality: N/A;    CORONARY ANGIOPLASTY  02/2014    CORONARY STENT PLACEMENT  02/05/2014    GASTRIC BYPASS      HEMICOLECTOMY Right     HERNIA REPAIR      TONSILLECTOMY      TOTAL KNEE ARTHROPLASTY Bilateral        Social History   Substance Use Topics    Smoking status: Former Smoker    Smokeless tobacco: Never Used    Alcohol use No       Family History   Problem Relation Age of Onset    Heart disease Mother     Hypertension Mother     Prostate cancer Mother     Cataracts Mother     Stomach cancer Father      "ulcers that turned to cancer"    Pancreatic cancer Sister     Leukemia Brother      "leukemia which led to intestinal " "cancer"    Cataracts Brother     Drug abuse Son     COPD Daughter     Hypertension Maternal Grandfather        Current Outpatient Prescriptions   Medication Sig    allopurinol (ZYLOPRIM) 300 MG tablet TAKE ONE TABLET BY MOUTH EVERY DAY    ALPRAZolam (XANAX) 0.25 MG tablet Take 1 tablet (0.25 mg total) by mouth 2 (two) times daily as needed for Anxiety.    ascorbic acid, vitamin C, (VITAMIN C) 100 MG tablet Take by mouth once daily.    aspirin (ECOTRIN) 81 MG EC tablet Take 81 mg by mouth nightly.     clopidogrel (PLAVIX) 75 mg tablet TAKE ONE TABLET BY MOUTH EVERY DAY    colchicine (COLCRYS) 0.6 mg tablet Take 1 tablet (0.6 mg total) by mouth once daily.    ergocalciferol, vitamin D2, 400 unit Tab Take 1 tablet by mouth once daily.     fluticasone (FLONASE) 50 mcg/actuation nasal spray 2 sprays by Each Nare route once daily.    furosemide (LASIX) 20 MG tablet Take 1 tablet (20 mg total) by mouth once daily.    gabapentin (NEURONTIN) 100 MG capsule Take 2 capsules (200 mg total) by mouth 3 (three) times daily. (Patient taking differently: Take 200 mg by mouth 3 (three) times daily. )    isosorbide mononitrate (IMDUR) 30 MG 24 hr tablet Take 1 tablet (30 mg total) by mouth nightly.    levothyroxine (SYNTHROID) 75 MCG tablet TAKE 1 TABLET(75 MCG) BY MOUTH BEFORE BREAKFAST    loratadine (CLARITIN) 10 mg tablet Take 1 tablet (10 mg total) by mouth once daily.    losartan (COZAAR) 25 MG tablet Take 1 tablet (25 mg total) by mouth once daily.    meloxicam (MOBIC) 7.5 MG tablet TAKE ONE TABLET BY MOUTH EVERY DAY AS NEEDED FOR PAIN    metoprolol tartrate (LOPRESSOR) 25 MG tablet Take 1 tablet (25 mg total) by mouth 2 (two) times daily.    mirtazapine (REMERON SOL-TAB) 15 MG disintegrating tablet DISSOLVE ONE TABLET BY MOUTH NIGHTLY    multivitamin (ONE DAILY MULTIVITAMIN) per tablet Take 1 tablet by mouth once daily.    ranitidine (ZANTAC) 150 MG capsule Take 150 mg by mouth 2 (two) times daily.    " rosuvastatin (CRESTOR) 10 MG tablet Take 1 tablet (10 mg total) by mouth every evening.    sertraline (ZOLOFT) 100 MG tablet Take 1.5 tablets (150 mg total) by mouth once daily.    ZINC ACETATE ORAL Take 250 mg by mouth once daily.     inhalation spacing device (RITEFLO AEROCHAMBER) Use as directed for inhalation.     No current facility-administered medications for this visit.      Current Outpatient Prescriptions on File Prior to Visit   Medication Sig    allopurinol (ZYLOPRIM) 300 MG tablet TAKE ONE TABLET BY MOUTH EVERY DAY    ALPRAZolam (XANAX) 0.25 MG tablet Take 1 tablet (0.25 mg total) by mouth 2 (two) times daily as needed for Anxiety.    ascorbic acid, vitamin C, (VITAMIN C) 100 MG tablet Take by mouth once daily.    aspirin (ECOTRIN) 81 MG EC tablet Take 81 mg by mouth nightly.     clopidogrel (PLAVIX) 75 mg tablet TAKE ONE TABLET BY MOUTH EVERY DAY    colchicine (COLCRYS) 0.6 mg tablet Take 1 tablet (0.6 mg total) by mouth once daily.    ergocalciferol, vitamin D2, 400 unit Tab Take 1 tablet by mouth once daily.     fluticasone (FLONASE) 50 mcg/actuation nasal spray 2 sprays by Each Nare route once daily.    furosemide (LASIX) 20 MG tablet Take 1 tablet (20 mg total) by mouth once daily.    gabapentin (NEURONTIN) 100 MG capsule Take 2 capsules (200 mg total) by mouth 3 (three) times daily. (Patient taking differently: Take 200 mg by mouth 3 (three) times daily. )    isosorbide mononitrate (IMDUR) 30 MG 24 hr tablet Take 1 tablet (30 mg total) by mouth nightly.    levothyroxine (SYNTHROID) 75 MCG tablet TAKE 1 TABLET(75 MCG) BY MOUTH BEFORE BREAKFAST    loratadine (CLARITIN) 10 mg tablet Take 1 tablet (10 mg total) by mouth once daily.    losartan (COZAAR) 25 MG tablet Take 1 tablet (25 mg total) by mouth once daily.    meloxicam (MOBIC) 7.5 MG tablet TAKE ONE TABLET BY MOUTH EVERY DAY AS NEEDED FOR PAIN    metoprolol tartrate (LOPRESSOR) 25 MG tablet Take 1 tablet (25 mg total) by  mouth 2 (two) times daily.    mirtazapine (REMERON SOL-TAB) 15 MG disintegrating tablet DISSOLVE ONE TABLET BY MOUTH NIGHTLY    multivitamin (ONE DAILY MULTIVITAMIN) per tablet Take 1 tablet by mouth once daily.    ranitidine (ZANTAC) 150 MG capsule Take 150 mg by mouth 2 (two) times daily.    rosuvastatin (CRESTOR) 10 MG tablet Take 1 tablet (10 mg total) by mouth every evening.    sertraline (ZOLOFT) 100 MG tablet Take 1.5 tablets (150 mg total) by mouth once daily.    ZINC ACETATE ORAL Take 250 mg by mouth once daily.     inhalation spacing device (RITEFLO AEROCHAMBER) Use as directed for inhalation.     No current facility-administered medications on file prior to visit.        Review of Systems   Constitution: Negative for diaphoresis, weakness, malaise/fatigue and weight gain.   HENT: Negative for hoarse voice.    Eyes: Negative for double vision and visual disturbance.   Cardiovascular: Negative for chest pain, claudication, cyanosis, dyspnea on exertion, irregular heartbeat, leg swelling, near-syncope, orthopnea, palpitations, paroxysmal nocturnal dyspnea and syncope.   Respiratory: Negative for cough, hemoptysis, shortness of breath and snoring.    Hematologic/Lymphatic: Negative for bleeding problem. Does not bruise/bleed easily.   Skin: Negative for color change and poor wound healing.   Musculoskeletal: Negative for muscle cramps, muscle weakness and myalgias.   Gastrointestinal: Positive for abdominal pain. Negative for bloating, change in bowel habit, diarrhea, heartburn, hematemesis, hematochezia, melena and nausea.   Neurological: Negative for excessive daytime sleepiness, dizziness, headaches, light-headedness, loss of balance and numbness.   Psychiatric/Behavioral: Positive for depression. Negative for memory loss. The patient is nervous/anxious. The patient does not have insomnia.    Allergic/Immunologic: Negative for hives.       Objective:   Physical Exam   Constitutional: She is oriented  to person, place, and time. She appears well-developed and well-nourished. She does not appear ill. No distress.   HENT:   Head: Normocephalic and atraumatic.   Eyes: EOM are normal. Pupils are equal, round, and reactive to light. No scleral icterus.   Neck: Normal range of motion. Neck supple. Normal carotid pulses, no hepatojugular reflux and no JVD present. Carotid bruit is not present. No tracheal deviation present. No thyromegaly present.   Cardiovascular: Normal rate, regular rhythm, normal heart sounds and normal pulses.  Exam reveals no gallop and no friction rub.    No murmur heard.  Pulmonary/Chest: Effort normal and breath sounds normal. No respiratory distress. She has no wheezes. She has no rhonchi. She has no rales. She exhibits no tenderness.   Pacer site well healed.   Abdominal: Soft. Normal appearance, normal aorta and bowel sounds are normal. She exhibits no distension, no abdominal bruit, no ascites and no pulsatile midline mass. There is no hepatomegaly. There is no tenderness.   Musculoskeletal: She exhibits no edema.        Right shoulder: She exhibits no deformity.   Neurological: She is alert and oriented to person, place, and time. She has normal strength. No cranial nerve deficit. Coordination normal.   Skin: Skin is warm and dry. No rash noted. She is not diaphoretic. No cyanosis or erythema. Nails show no clubbing.   Psychiatric: She has a normal mood and affect. Her speech is normal and behavior is normal.   Nursing note and vitals reviewed.    Vitals:    01/25/18 1126 01/25/18 1129   BP: 118/80 114/68   BP Location: Right arm Left arm   Patient Position: Sitting Sitting   BP Method: Medium (Manual) Medium (Manual)   Pulse: 80    Weight: 71.3 kg (157 lb 3 oz)      Lab Results   Component Value Date    CHOL 138 04/05/2017    CHOL 114 (L) 11/07/2016    CHOL 107 (L) 10/03/2016     Lab Results   Component Value Date    HDL 58 04/05/2017    HDL 51 11/07/2016    HDL 52 10/03/2016     Lab  Results   Component Value Date    LDLCALC 62.8 (L) 04/05/2017    LDLCALC 41.8 (L) 11/07/2016    LDLCALC 38.6 (L) 10/03/2016     Lab Results   Component Value Date    TRIG 86 04/05/2017    TRIG 106 11/07/2016    TRIG 82 10/03/2016     Lab Results   Component Value Date    CHOLHDL 42.0 04/05/2017    CHOLHDL 44.7 11/07/2016    CHOLHDL 48.6 10/03/2016       Chemistry        Component Value Date/Time     12/14/2017 0945    K 4.9 12/14/2017 0945     (H) 12/14/2017 0945    CO2 23 12/14/2017 0945    BUN 25 (H) 12/14/2017 0945    CREATININE 1.1 12/14/2017 0945    GLU 82 12/14/2017 0945        Component Value Date/Time    CALCIUM 9.2 12/14/2017 0945    ALKPHOS 130 12/14/2017 0945    AST 89 (H) 12/18/2017 1232    ALT 85 (H) 12/18/2017 1232    BILITOT 0.4 12/14/2017 0945    ESTGFRAFRICA 56 (A) 12/14/2017 0945    EGFRNONAA 49 (A) 12/14/2017 0945          Lab Results   Component Value Date    TSH 2.936 12/18/2017     Lab Results   Component Value Date    INR 1.0 09/01/2017    INR 1.0 04/05/2017     Lab Results   Component Value Date    WBC 6.94 12/14/2017    HGB 10.5 (L) 12/14/2017    HCT 33.0 (L) 12/14/2017     (H) 12/14/2017     12/14/2017     BMP  Sodium   Date Value Ref Range Status   12/14/2017 143 136 - 145 mmol/L Final     Potassium   Date Value Ref Range Status   12/14/2017 4.9 3.5 - 5.1 mmol/L Final     Chloride   Date Value Ref Range Status   12/14/2017 115 (H) 95 - 110 mmol/L Final     CO2   Date Value Ref Range Status   12/14/2017 23 23 - 29 mmol/L Final     BUN, Bld   Date Value Ref Range Status   12/14/2017 25 (H) 8 - 23 mg/dL Final     Creatinine   Date Value Ref Range Status   12/14/2017 1.1 0.5 - 1.4 mg/dL Final     Calcium   Date Value Ref Range Status   12/14/2017 9.2 8.7 - 10.5 mg/dL Final     Anion Gap   Date Value Ref Range Status   12/14/2017 5 (L) 8 - 16 mmol/L Final     eGFR if    Date Value Ref Range Status   12/14/2017 56 (A) >60 mL/min/1.73 m^2 Final     eGFR  if non    Date Value Ref Range Status   12/14/2017 49 (A) >60 mL/min/1.73 m^2 Final     Comment:     Calculation used to obtain the estimated glomerular filtration  rate (eGFR) is the CKD-EPI equation.        CrCl cannot be calculated (Patient's most recent lab result is older than the maximum 7 days allowed.).    Assessment:     1. CAD, multiple vessel    2. Essential hypertension    3. Hyperlipidemia, unspecified hyperlipidemia type    4. Diffusion capacity of lung (dl), decreased    5. Congestive heart failure, unspecified congestive heart failure chronicity, unspecified congestive heart failure type    6. S/P coronary artery stent placement    7. Pacemaker    8. Coronary artery disease involving native coronary artery of native heart without angina pectoris    9. Ischemic cardiomyopathy    10. Diffuse large B-cell lymphoma of intra-abdominal lymph nodes    11. Drug-induced polyneuropathy      Stable clinically no afib will continue current therapy  Plan:   Continue current therapy  Cardiac low salt diet.  Risk factor modification and excercise program.  F/u in 6 months with lipid cmp

## 2018-01-29 DIAGNOSIS — E78.5 HYPERLIPIDEMIA, UNSPECIFIED HYPERLIPIDEMIA TYPE: Chronic | ICD-10-CM

## 2018-01-29 RX ORDER — ROSUVASTATIN CALCIUM 10 MG/1
TABLET, COATED ORAL
Qty: 30 TABLET | Refills: 6 | Status: SHIPPED | OUTPATIENT
Start: 2018-01-29 | End: 2018-07-27 | Stop reason: SDUPTHER

## 2018-02-05 ENCOUNTER — TELEPHONE (OUTPATIENT)
Dept: RADIOLOGY | Facility: HOSPITAL | Age: 78
End: 2018-02-05

## 2018-02-07 ENCOUNTER — HOSPITAL ENCOUNTER (OUTPATIENT)
Dept: RADIOLOGY | Facility: HOSPITAL | Age: 78
Discharge: HOME | End: 2018-02-07
Attending: INTERNAL MEDICINE
Payer: MEDICARE

## 2018-02-07 DIAGNOSIS — C85.83 LARGE CELL LYMPHOMA OF INTRA-ABDOMINAL LYMPH NODES: ICD-10-CM

## 2018-02-07 PROCEDURE — A9552 F18 FDG: HCPCS

## 2018-02-07 PROCEDURE — 78815 PET IMAGE W/CT SKULL-THIGH: CPT | Mod: TC

## 2018-02-07 PROCEDURE — 78815 PET IMAGE W/CT SKULL-THIGH: CPT | Mod: 26,PS,, | Performed by: RADIOLOGY

## 2018-02-08 ENCOUNTER — OFFICE VISIT (OUTPATIENT)
Dept: HEMATOLOGY/ONCOLOGY | Facility: CLINIC | Age: 78
End: 2018-02-08
Payer: MEDICARE

## 2018-02-08 ENCOUNTER — LAB VISIT (OUTPATIENT)
Dept: LAB | Facility: HOSPITAL | Age: 78
End: 2018-02-08
Attending: INTERNAL MEDICINE
Payer: MEDICARE

## 2018-02-08 ENCOUNTER — INFUSION (OUTPATIENT)
Dept: INFUSION THERAPY | Facility: HOSPITAL | Age: 78
End: 2018-02-08
Attending: INTERNAL MEDICINE
Payer: MEDICARE

## 2018-02-08 VITALS
BODY MASS INDEX: 25.97 KG/M2 | TEMPERATURE: 98 F | HEART RATE: 79 BPM | SYSTOLIC BLOOD PRESSURE: 107 MMHG | OXYGEN SATURATION: 95 % | DIASTOLIC BLOOD PRESSURE: 67 MMHG | WEIGHT: 152.13 LBS | HEIGHT: 64 IN

## 2018-02-08 DIAGNOSIS — C85.83 LARGE CELL LYMPHOMA OF INTRA-ABDOMINAL LYMPH NODES: ICD-10-CM

## 2018-02-08 DIAGNOSIS — M1A.09X0 IDIOPATHIC CHRONIC GOUT OF MULTIPLE SITES WITHOUT TOPHUS: ICD-10-CM

## 2018-02-08 DIAGNOSIS — D64.9 CHRONIC ANEMIA: ICD-10-CM

## 2018-02-08 DIAGNOSIS — C85.83 LARGE CELL LYMPHOMA OF INTRA-ABDOMINAL LYMPH NODES: Primary | ICD-10-CM

## 2018-02-08 DIAGNOSIS — C83.33 DIFFUSE LARGE B-CELL LYMPHOMA OF INTRA-ABDOMINAL LYMPH NODES: Primary | ICD-10-CM

## 2018-02-08 LAB
ALBUMIN SERPL BCP-MCNC: 3.4 G/DL
ALP SERPL-CCNC: 122 U/L
ALT SERPL W/O P-5'-P-CCNC: 92 U/L
ANION GAP SERPL CALC-SCNC: 7 MMOL/L
AST SERPL-CCNC: 123 U/L
BASOPHILS # BLD AUTO: 0.01 K/UL
BASOPHILS NFR BLD: 0.1 %
BILIRUB SERPL-MCNC: 0.3 MG/DL
BUN SERPL-MCNC: 29 MG/DL
CALCIUM SERPL-MCNC: 9.1 MG/DL
CHLORIDE SERPL-SCNC: 118 MMOL/L
CO2 SERPL-SCNC: 18 MMOL/L
CREAT SERPL-MCNC: 1.2 MG/DL
DIFFERENTIAL METHOD: ABNORMAL
EOSINOPHIL # BLD AUTO: 0.1 K/UL
EOSINOPHIL NFR BLD: 1.3 %
ERYTHROCYTE [DISTWIDTH] IN BLOOD BY AUTOMATED COUNT: 17.1 %
EST. GFR  (AFRICAN AMERICAN): 51 ML/MIN/1.73 M^2
EST. GFR  (NON AFRICAN AMERICAN): 44 ML/MIN/1.73 M^2
GLUCOSE SERPL-MCNC: 92 MG/DL
HCT VFR BLD AUTO: 34.7 %
HGB BLD-MCNC: 10.9 G/DL
LYMPHOCYTES # BLD AUTO: 2.8 K/UL
LYMPHOCYTES NFR BLD: 37 %
MCH RBC QN AUTO: 32.7 PG
MCHC RBC AUTO-ENTMCNC: 31.4 G/DL
MCV RBC AUTO: 104 FL
MONOCYTES # BLD AUTO: 0.6 K/UL
MONOCYTES NFR BLD: 8.2 %
NEUTROPHILS # BLD AUTO: 4.1 K/UL
NEUTROPHILS NFR BLD: 53.4 %
PLATELET # BLD AUTO: 180 K/UL
PMV BLD AUTO: 10.2 FL
POTASSIUM SERPL-SCNC: 5.4 MMOL/L
PROT SERPL-MCNC: 6.1 G/DL
RBC # BLD AUTO: 3.33 M/UL
SODIUM SERPL-SCNC: 143 MMOL/L
URATE SERPL-MCNC: 5.5 MG/DL
WBC # BLD AUTO: 7.64 K/UL

## 2018-02-08 PROCEDURE — 85025 COMPLETE CBC W/AUTO DIFF WBC: CPT

## 2018-02-08 PROCEDURE — 80053 COMPREHEN METABOLIC PANEL: CPT

## 2018-02-08 PROCEDURE — 1159F MED LIST DOCD IN RCRD: CPT | Mod: S$GLB,,, | Performed by: INTERNAL MEDICINE

## 2018-02-08 PROCEDURE — 96523 IRRIG DRUG DELIVERY DEVICE: CPT

## 2018-02-08 PROCEDURE — 25000003 PHARM REV CODE 250: Performed by: INTERNAL MEDICINE

## 2018-02-08 PROCEDURE — 84550 ASSAY OF BLOOD/URIC ACID: CPT

## 2018-02-08 PROCEDURE — 1125F AMNT PAIN NOTED PAIN PRSNT: CPT | Mod: S$GLB,,, | Performed by: INTERNAL MEDICINE

## 2018-02-08 PROCEDURE — 99999 PR PBB SHADOW E&M-EST. PATIENT-LVL III: CPT | Mod: PBBFAC,,, | Performed by: INTERNAL MEDICINE

## 2018-02-08 PROCEDURE — 36415 COLL VENOUS BLD VENIPUNCTURE: CPT

## 2018-02-08 PROCEDURE — 63600175 PHARM REV CODE 636 W HCPCS: Performed by: INTERNAL MEDICINE

## 2018-02-08 PROCEDURE — A4216 STERILE WATER/SALINE, 10 ML: HCPCS | Performed by: INTERNAL MEDICINE

## 2018-02-08 PROCEDURE — 99499 UNLISTED E&M SERVICE: CPT | Mod: S$GLB,,, | Performed by: INTERNAL MEDICINE

## 2018-02-08 PROCEDURE — 3008F BODY MASS INDEX DOCD: CPT | Mod: S$GLB,,, | Performed by: INTERNAL MEDICINE

## 2018-02-08 PROCEDURE — 99214 OFFICE O/P EST MOD 30 MIN: CPT | Mod: S$GLB,,, | Performed by: INTERNAL MEDICINE

## 2018-02-08 RX ORDER — SODIUM CHLORIDE 0.9 % (FLUSH) 0.9 %
10 SYRINGE (ML) INJECTION
Status: CANCELLED | OUTPATIENT
Start: 2018-02-08

## 2018-02-08 RX ORDER — HEPARIN 100 UNIT/ML
500 SYRINGE INTRAVENOUS
Status: DISCONTINUED | OUTPATIENT
Start: 2018-02-08 | End: 2018-02-08 | Stop reason: HOSPADM

## 2018-02-08 RX ORDER — HEPARIN 100 UNIT/ML
500 SYRINGE INTRAVENOUS
Status: CANCELLED | OUTPATIENT
Start: 2018-02-08

## 2018-02-08 RX ORDER — SODIUM CHLORIDE 9 MG/ML
10 INJECTION, SOLUTION INTRAMUSCULAR; INTRAVENOUS; SUBCUTANEOUS
Status: DISCONTINUED | OUTPATIENT
Start: 2018-02-08 | End: 2018-02-08 | Stop reason: HOSPADM

## 2018-02-08 RX ADMIN — SODIUM CHLORIDE, PRESERVATIVE FREE 10 ML: 5 INJECTION INTRAVENOUS at 02:02

## 2018-02-08 RX ADMIN — HEPARIN 500 UNITS: 100 SYRINGE at 02:02

## 2018-02-09 NOTE — PROGRESS NOTES
Subjective:       Patient ID: Violet Swenson is a 77 y.o. female.    Chief Complaint: No chief complaint on file.    HPI  Review of Systems    Objective:      Physical Exam    Assessment:       1. Diffuse large B-cell lymphoma of intra-abdominal lymph nodes    2. Chronic anemia    3. 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        Plan:       ***

## 2018-02-09 NOTE — PROGRESS NOTES
Hematology/Oncology Office Note    CC:  Lymphoma    Referred by:  No ref. provider found    Diagnosis:  Diffuse large B-cell lymphoma    History of present illness:  77-year-old female followed by Dr. Matson in the hematology/oncology clinic for diffuse large B-cell lymphoma.  She received 3 cycles of R CHOP and developed CHF.  Treatment was discontinued and she was noted to be in remission.  She has been monitored without additional treatment since that time.    She presents today with complaints of bilateral neuropathy to fingers/hands.  She is currently on Neurontin 100 mg 3 times a day however reports that the neuropathy is worsened significantly over the previous 2 months.    I have reviewed and updated the HPI, ROS, PMHx, Social Hx, Family Hx and treatment history.    Today's visit:  Patient presents today to discuss results of PET scan.  She has no complaints today      Past Medical History:   Diagnosis Date    Arthritis     Cancer     lymphoma    Coronary artery disease     01/2015 C patent LCX. 50% stenosis in LAD and RCA.      Depression     Encounter for blood transfusion     GERD (gastroesophageal reflux disease)     Gout, arthritis     Hx of psychiatric care     Hyperlipidemia     Hypertension     Hypothyroidism     Lung nodule 2014    RML--stable    Pacemaker     Metronic    Pneumonia     Psychiatric problem          Social History:      Family History: family history includes COPD in her daughter; Cataracts in her brother and mother; Drug abuse in her son; Heart disease in her mother; Hypertension in her maternal grandfather and mother; Leukemia in her brother; Pancreatic cancer in her sister; Prostate cancer in her mother; Stomach cancer in her father.      HPI    Review of Systems   Constitutional: Positive for activity change and fatigue. Negative for appetite change, fever and unexpected weight change.   HENT: Negative for congestion, drooling, mouth sores, nosebleeds, rhinorrhea  and sore throat.    Eyes: Negative.    Respiratory: Negative for apnea, cough and shortness of breath.    Cardiovascular: Negative for chest pain, palpitations and leg swelling.   Gastrointestinal: Negative for abdominal distention, abdominal pain, anal bleeding, blood in stool, constipation, diarrhea, nausea and vomiting.   Endocrine: Negative.    Genitourinary: Negative for difficulty urinating, dyspareunia, dysuria, flank pain, frequency, hematuria, pelvic pain and vaginal bleeding.   Musculoskeletal: Positive for arthralgias and myalgias. Negative for back pain, gait problem, joint swelling and neck pain.        Worsening neuropathy to bilateral hands   Skin: Negative for pallor, rash and wound.   Allergic/Immunologic: Negative.    Neurological: Negative for dizziness, tremors, seizures, syncope, speech difficulty, weakness and light-headedness.        Worsening neuropathy bilateral hands   Hematological: Negative for adenopathy. Does not bruise/bleed easily.   Psychiatric/Behavioral: Negative for agitation, behavioral problems, confusion, dysphoric mood and hallucinations. The patient is not nervous/anxious and is not hyperactive.        Objective:      Physical Exam   Constitutional: She is oriented to person, place, and time. She appears well-developed and well-nourished. No distress.   HENT:   Head: Normocephalic and atraumatic.   Nose: Nose normal.   Mouth/Throat: Oropharynx is clear and moist. No oropharyngeal exudate.   Eyes: Conjunctivae and EOM are normal. Pupils are equal, round, and reactive to light. No scleral icterus.   Neck: Normal range of motion. Neck supple. No JVD present. No tracheal deviation present. No thyromegaly present.   Cardiovascular: Normal rate, regular rhythm, normal heart sounds and intact distal pulses.  Exam reveals no gallop and no friction rub.    No murmur heard.  Pulmonary/Chest: Effort normal and breath sounds normal. She has no wheezes. She has no rales. She exhibits no  tenderness.   Abdominal: Soft. Bowel sounds are normal. She exhibits no distension and no mass. There is no tenderness. There is no rebound and no guarding.   Musculoskeletal: Normal range of motion. She exhibits no edema or tenderness.   Lymphadenopathy:     She has no cervical adenopathy.   Neurological: She is alert and oriented to person, place, and time. No cranial nerve deficit. She exhibits normal muscle tone. Coordination normal.   Skin: Skin is warm, dry and intact. Capillary refill takes less than 2 seconds. No abrasion, no bruising and no rash noted. She is not diaphoretic. No cyanosis or erythema. No pallor. Nails show no clubbing.   Psychiatric: She has a normal mood and affect. Her behavior is normal. Judgment and thought content normal.       Lab Results   Component Value Date    WBC 7.64 02/08/2018    HGB 10.9 (L) 02/08/2018    HCT 34.7 (L) 02/08/2018     (H) 02/08/2018     02/08/2018     Lab Results   Component Value Date    CREATININE 1.2 02/08/2018    BUN 29 (H) 02/08/2018     02/08/2018    K 5.4 (H) 02/08/2018     (H) 02/08/2018    CO2 18 (L) 02/08/2018     Lab Results   Component Value Date    ALT 92 (H) 02/08/2018     (H) 02/08/2018    ALKPHOS 122 02/08/2018    BILITOT 0.3 02/08/2018         Assessment:       77-year-old female with a history of diffuse large B-cell lymphoma status post or CHOP who presents today with complaints of worsening bilateral neuropathy to hands.  She is followed by Dr. Matson in the hematology/oncology clinic and I am seeing the patient for the first time today in his absence.   The patient completed 3 cycles of R CHOP and was unable to proceed with additional doses due to heart failure which developed during treatment.   She presents today to discuss results of PET scan performed to evaluate for recurrent disease.  PET scan remains ALIVIA and patient continues to do very well clinically.  She will follow-up with Dr. Matson in 6 weeks  with plans to repeat a PET scan in 3 months        Diffuse large B-cell lymphoma:  --Continue routine surveillance  --Follow-up in Dr. Matson in 6 weeks  --Repeat PET scan in 3 months

## 2018-02-12 ENCOUNTER — TELEPHONE (OUTPATIENT)
Dept: HEMATOLOGY/ONCOLOGY | Facility: CLINIC | Age: 78
End: 2018-02-12

## 2018-02-12 NOTE — TELEPHONE ENCOUNTER
----- Message from Rosio Mendez sent at 2/12/2018  9:42 AM CST -----  Contact: pt  Calling about a missed call and potassium level and please advise [phone]

## 2018-02-14 ENCOUNTER — OFFICE VISIT (OUTPATIENT)
Dept: RHEUMATOLOGY | Facility: CLINIC | Age: 78
End: 2018-02-14
Payer: MEDICARE

## 2018-02-14 VITALS
WEIGHT: 156.94 LBS | HEART RATE: 85 BPM | BODY MASS INDEX: 26.94 KG/M2 | DIASTOLIC BLOOD PRESSURE: 80 MMHG | SYSTOLIC BLOOD PRESSURE: 137 MMHG

## 2018-02-14 DIAGNOSIS — N18.30 STAGE 3 CHRONIC KIDNEY DISEASE: ICD-10-CM

## 2018-02-14 DIAGNOSIS — M18.9 UNILATERAL OSTEOARTHRITIS OF FIRST CARPOMETACARPAL (CMC) JOINT: ICD-10-CM

## 2018-02-14 DIAGNOSIS — M15.9 PRIMARY OSTEOARTHRITIS INVOLVING MULTIPLE JOINTS: ICD-10-CM

## 2018-02-14 DIAGNOSIS — R79.89 ABNORMAL LFTS (LIVER FUNCTION TESTS): ICD-10-CM

## 2018-02-14 DIAGNOSIS — M1A.09X0 IDIOPATHIC CHRONIC GOUT OF MULTIPLE SITES WITHOUT TOPHUS: ICD-10-CM

## 2018-02-14 DIAGNOSIS — M18.9 UNILATERAL OSTEOARTHRITIS OF FIRST CARPOMETACARPAL (CMC) JOINT: Primary | ICD-10-CM

## 2018-02-14 PROCEDURE — 99999 PR PBB SHADOW E&M-EST. PATIENT-LVL III: CPT | Mod: PBBFAC,,, | Performed by: INTERNAL MEDICINE

## 2018-02-14 PROCEDURE — 1159F MED LIST DOCD IN RCRD: CPT | Mod: S$GLB,,, | Performed by: INTERNAL MEDICINE

## 2018-02-14 PROCEDURE — 1125F AMNT PAIN NOTED PAIN PRSNT: CPT | Mod: S$GLB,,, | Performed by: INTERNAL MEDICINE

## 2018-02-14 PROCEDURE — 99215 OFFICE O/P EST HI 40 MIN: CPT | Mod: S$GLB,,, | Performed by: INTERNAL MEDICINE

## 2018-02-14 PROCEDURE — 3008F BODY MASS INDEX DOCD: CPT | Mod: S$GLB,,, | Performed by: INTERNAL MEDICINE

## 2018-02-14 PROCEDURE — 99499 UNLISTED E&M SERVICE: CPT | Mod: S$GLB,,, | Performed by: INTERNAL MEDICINE

## 2018-02-14 RX ORDER — COLCHICINE 0.6 MG/1
0.6 TABLET ORAL DAILY
Qty: 90 TABLET | Refills: 3 | Status: SHIPPED | OUTPATIENT
Start: 2018-02-14 | End: 2018-02-14 | Stop reason: SDUPTHER

## 2018-02-14 RX ORDER — ALLOPURINOL 300 MG/1
300 TABLET ORAL DAILY
Qty: 90 TABLET | Refills: 3 | Status: SHIPPED | OUTPATIENT
Start: 2018-02-14 | End: 2018-02-14 | Stop reason: SDUPTHER

## 2018-02-14 RX ORDER — COLCHICINE 0.6 MG/1
0.6 TABLET ORAL DAILY
Qty: 90 TABLET | Refills: 3 | Status: SHIPPED | OUTPATIENT
Start: 2018-02-14 | End: 2018-02-14

## 2018-02-14 RX ORDER — COLCHICINE 0.6 MG/1
0.6 TABLET, FILM COATED ORAL DAILY
Qty: 30 TABLET | Refills: 11 | Status: SHIPPED | OUTPATIENT
Start: 2018-02-14 | End: 2019-02-17 | Stop reason: SDUPTHER

## 2018-02-14 RX ORDER — ALLOPURINOL 300 MG/1
300 TABLET ORAL DAILY
Qty: 90 TABLET | Refills: 3 | Status: SHIPPED | OUTPATIENT
Start: 2018-02-14 | End: 2019-02-17 | Stop reason: SDUPTHER

## 2018-02-14 RX ORDER — DICLOFENAC SODIUM 10 MG/G
2 GEL TOPICAL DAILY
Qty: 100 G | Refills: 6 | Status: SHIPPED | OUTPATIENT
Start: 2018-02-14

## 2018-02-14 RX ORDER — DICLOFENAC SODIUM 10 MG/G
2 GEL TOPICAL DAILY
Qty: 100 G | Refills: 6 | Status: SHIPPED | OUTPATIENT
Start: 2018-02-14 | End: 2018-02-14 | Stop reason: SDUPTHER

## 2018-02-14 NOTE — PROGRESS NOTES
RHEUMATOLOGY CLINIC FOLLOW UP VISIT  Chief complaints:-  To follow-up for gout.  My joints hurt.  My thumbs hurt the most.  The neuropathy pain over my legs are worsening.    HPI:-  Violet Zhao a 77 y.o. pleasant female comes in for a follow up visit with above chief complaints.  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  She denies any gout flareups since last visit.  The only joint pains she has today or over the base of bilateral thumbs which is making-holding books difficult.  She denies any adverse effects from allopurinol or colchicine.  She reports that her neuropathy pain from her chemotherapy is worsening in both feet.  Her chemotherapy was discontinued after she developed congestive heart failure.  The recent PET CT scan done for her lymphoma showed improvement.  The gabapentin doesn't seem to help her neuropathy.  Recent labs also shows that she has worsening kidney disease and liver function abnormalities.  Her kidney functions were normal before July of last year.  She haven't seen a nephrologist or hepatologist for these abnormalities and is now.      Review of Systems   Constitutional: Positive for malaise/fatigue and weight loss. Negative for chills and fever.   HENT: Negative for nosebleeds and sore throat.    Eyes: Negative for blurred vision, photophobia and redness.   Respiratory: Negative for cough, sputum production and shortness of breath.    Cardiovascular: Negative for chest pain and leg swelling.   Gastrointestinal: Negative for abdominal pain, constipation and diarrhea.   Genitourinary: Negative for dysuria, frequency and urgency.   Musculoskeletal: Positive for back pain, joint pain and neck pain. Negative for falls and myalgias.   Skin: Negative for itching and rash.   Neurological: Positive for tingling. Negative for dizziness, tremors,  "seizures, loss of consciousness, weakness and headaches.   Endo/Heme/Allergies: Negative for environmental allergies. Does not bruise/bleed easily.   Psychiatric/Behavioral: Negative for hallucinations and memory loss. The patient does not have insomnia.        Past Medical History:   Diagnosis Date    Arthritis     Cancer     lymphoma    Coronary artery disease     01/2015 C patent LCX. 50% stenosis in LAD and RCA.      Depression     Encounter for blood transfusion     GERD (gastroesophageal reflux disease)     Gout, arthritis     Hx of psychiatric care     Hyperlipidemia     Hypertension     Hypothyroidism     Lung nodule 2014    RML--stable    Pacemaker     Metronic    Pneumonia     Psychiatric problem        Past Surgical History:   Procedure Laterality Date    APPENDECTOMY      CARDIAC PACEMAKER PLACEMENT  01/22/2015    CHOLECYSTECTOMY      COLONOSCOPY N/A 4/6/2017    Procedure: COLONOSCOPY;  Surgeon: Tye Enamorado MD;  Location: [facility];  Service: Endoscopy;  Laterality: N/A;    CORONARY ANGIOPLASTY  02/2014    CORONARY STENT PLACEMENT  02/05/2014    GASTRIC BYPASS      HEMICOLECTOMY Right     HERNIA REPAIR      TONSILLECTOMY      TOTAL KNEE ARTHROPLASTY Bilateral         Social History   Substance Use Topics    Smoking status: Former Smoker    Smokeless tobacco: Never Used    Alcohol use No       Family History   Problem Relation Age of Onset    Heart disease Mother     Hypertension Mother     Prostate cancer Mother     Cataracts Mother     Stomach cancer Father      "ulcers that turned to cancer"    Pancreatic cancer Sister     Leukemia Brother      "leukemia which led to intestinal cancer"    Cataracts Brother     Drug abuse Son     COPD Daughter     Hypertension Maternal Grandfather        Review of patient's allergies indicates:   Allergen Reactions    Corticosteroids (glucocorticoids) Nausea Only and Other (See Comments)     Stomach pain, dizziness, headache "    Oxycodone Other (See Comments)     Blood pressure dropped           Physical examination:-    Vitals:    02/14/18 1000   BP: 137/80   Pulse: 85   Weight: 71.2 kg (156 lb 15.5 oz)   PainSc:   5   PainLoc: Finger       Physical Exam   Constitutional: She is oriented to person, place, and time and well-developed, well-nourished, and in no distress. No distress.   HENT:   Head: Normocephalic.   Mouth/Throat: Oropharynx is clear and moist.   Eyes: Conjunctivae and EOM are normal. Pupils are equal, round, and reactive to light.   Neck: Normal range of motion. Neck supple.   Cardiovascular: Normal rate and intact distal pulses.    Pulmonary/Chest: Effort normal. No respiratory distress.   Abdominal: Soft. There is no tenderness.   Musculoskeletal:   Significant tenderness over bilateral first carpometacarpal joints left more than right.  No effusion over small joints of hands or feet.  Crepitus of her large joints without any effusion.  No tophi.   Neurological: She is alert and oriented to person, place, and time. No cranial nerve deficit.   Skin: Skin is warm. No rash noted. No erythema.   Psychiatric: Mood and affect normal.   Nursing note and vitals reviewed.      Labs:-  Uric acid normal.    Medication List with Changes/Refills   New Medications    DICLOFENAC SODIUM 1 % GEL    Apply 2 g topically once daily. Apply 2 g over painful joints once or twice a day.   Current Medications    ALPRAZOLAM (XANAX) 0.25 MG TABLET    Take 1 tablet (0.25 mg total) by mouth 2 (two) times daily as needed for Anxiety.    ASCORBIC ACID, VITAMIN C, (VITAMIN C) 100 MG TABLET    Take by mouth once daily.    ASPIRIN (ECOTRIN) 81 MG EC TABLET    Take 81 mg by mouth nightly.     CLOPIDOGREL (PLAVIX) 75 MG TABLET    TAKE ONE TABLET BY MOUTH EVERY DAY    ERGOCALCIFEROL, VITAMIN D2, 400 UNIT TAB    Take 1 tablet by mouth once daily.     FLUTICASONE (FLONASE) 50 MCG/ACTUATION NASAL SPRAY    2 sprays by Each Nare route once daily.    FUROSEMIDE  (LASIX) 20 MG TABLET    Take 1 tablet (20 mg total) by mouth once daily.    GABAPENTIN (NEURONTIN) 100 MG CAPSULE    Take 2 capsules (200 mg total) by mouth 3 (three) times daily.    INHALATION SPACING DEVICE (RITEFLO AEROCHAMBER)    Use as directed for inhalation.    ISOSORBIDE MONONITRATE (IMDUR) 30 MG 24 HR TABLET    Take 1 tablet (30 mg total) by mouth nightly.    LEVOTHYROXINE (SYNTHROID) 75 MCG TABLET    TAKE 1 TABLET(75 MCG) BY MOUTH BEFORE BREAKFAST    LORATADINE (CLARITIN) 10 MG TABLET    Take 1 tablet (10 mg total) by mouth once daily.    LOSARTAN (COZAAR) 25 MG TABLET    Take 1 tablet (25 mg total) by mouth once daily.    MELOXICAM (MOBIC) 7.5 MG TABLET    TAKE ONE TABLET BY MOUTH EVERY DAY AS NEEDED FOR PAIN    METOPROLOL TARTRATE (LOPRESSOR) 25 MG TABLET    Take 1 tablet (25 mg total) by mouth 2 (two) times daily.    MIRTAZAPINE (REMERON SOL-TAB) 15 MG DISINTEGRATING TABLET    DISSOLVE ONE TABLET BY MOUTH NIGHTLY    MULTIVITAMIN (ONE DAILY MULTIVITAMIN) PER TABLET    Take 1 tablet by mouth once daily.    RANITIDINE (ZANTAC) 150 MG CAPSULE    Take 150 mg by mouth 2 (two) times daily.    ROSUVASTATIN (CRESTOR) 10 MG TABLET    TAKE ONE TABLET BY MOUTH EVERY EVENING    SERTRALINE (ZOLOFT) 100 MG TABLET    Take 1.5 tablets (150 mg total) by mouth once daily.    ZINC ACETATE ORAL    Take 250 mg by mouth once daily.    Changed and/or Refilled Medications    Modified Medication Previous Medication    ALLOPURINOL (ZYLOPRIM) 300 MG TABLET allopurinol (ZYLOPRIM) 300 MG tablet       Take 1 tablet (300 mg total) by mouth once daily.    TAKE ONE TABLET BY MOUTH EVERY DAY   Discontinued Medications    COLCHICINE (COLCRYS) 0.6 MG TABLET    Take 1 tablet (0.6 mg total) by mouth once daily.       Assessment/Plans:-  # Idiopathic chronic gout of multiple sites without tophus  Labs since last visit.  Uric acid at goal on 300 mg allopurinol.  Prophylactic colchicine prevents gout attack at this time.  Continue the  allopurinol and colchicine.  Carefully follow renal functions and if any worsening consider every other day colchicine.    # Unilateral osteoarthritis of first carpometacarpal (CMC) joint  Left more than right osteoarthritis of the first carpometacarpal joints with mild effusion over left.  Not interested in injections at this time.  Try diclofenac topical gel ice application. If not better, consider injecting with kenalog.     # Abnormal LFTs (liver function tests)  Referred to hepatologist.   - Ambulatory Referral to Hepatology    # Stage 3 chronic kidney disease  Worsening. Refer to nephrologist.   - Ambulatory referral to Nephrology       # RTC in 6 months.   Disclaimer: This note was prepared using voice recognition system and is likely to have sound alike errors .  Please call me with any questions

## 2018-02-14 NOTE — ASSESSMENT & PLAN NOTE
Left more than right osteoarthritis of the first carpometacarpal joints with mild effusion over left.  Not interested in injections at this time.  Try diclofenac topical gel ice application. If not better, consider injecting with kenalog.

## 2018-02-14 NOTE — ASSESSMENT & PLAN NOTE
Labs since last visit.  Uric acid at goal on 300 mg allopurinol.  Prophylactic colchicine prevents gout attack at this time.  Continue the allopurinol and colchicine.  Carefully follow renal functions and if any worsening consider every other day colchicine.

## 2018-02-18 ENCOUNTER — DOCUMENTATION ONLY (OUTPATIENT)
Dept: TRANSPLANT | Facility: CLINIC | Age: 78
End: 2018-02-18

## 2018-02-18 NOTE — LETTER
February 18, 2018    Violet Swenson  [address] [facility] [address]      Dear Violet Swenson:    Your doctor has referred you to the [facility]. You will be contacted by our office and an initial appointment will then be scheduled for you.    We look forward to seeing you soon. If you have any further questions, please contact us at [phone].       Sincerely,        [facility]   [address] [place] [address] [phone]

## 2018-02-19 NOTE — NURSING
Pt records reviewed.   Pt will be referred to Hepatology.    Initial referral received  from the [facility]que.   Referring Provider/diagnosis  NELI HUFF Provider:   Diagnosis: Abnormal LFTs (liver function tests           Referral letter sent to provider and patient.

## 2018-02-20 ENCOUNTER — TELEPHONE (OUTPATIENT)
Dept: GASTROENTEROLOGY | Facility: CLINIC | Age: 78
End: 2018-02-20

## 2018-02-20 NOTE — TELEPHONE ENCOUNTER
Left message on answering machine to return a call to Dr. Morales's office at [facility], schedule appointment, [facility].

## 2018-02-20 NOTE — TELEPHONE ENCOUNTER
Left message on answering machine to return a call to Dr. Morales's office at [facility],schedule appointment, [facility].

## 2018-02-21 ENCOUNTER — LAB VISIT (OUTPATIENT)
Dept: LAB | Facility: HOSPITAL | Age: 78
End: 2018-02-21
Attending: INTERNAL MEDICINE
Payer: MEDICARE

## 2018-02-21 ENCOUNTER — OFFICE VISIT (OUTPATIENT)
Dept: GASTROENTEROLOGY | Facility: CLINIC | Age: 78
End: 2018-02-21
Payer: MEDICARE

## 2018-02-21 VITALS
SYSTOLIC BLOOD PRESSURE: 128 MMHG | HEART RATE: 78 BPM | WEIGHT: 156.5 LBS | HEIGHT: 64 IN | DIASTOLIC BLOOD PRESSURE: 72 MMHG | BODY MASS INDEX: 26.72 KG/M2

## 2018-02-21 DIAGNOSIS — R79.89 ABNORMAL LFTS: ICD-10-CM

## 2018-02-21 DIAGNOSIS — R79.89 ABNORMAL LFTS: Primary | ICD-10-CM

## 2018-02-21 LAB
A1AT SERPL-MCNC: 161 MG/DL
CK SERPL-CCNC: 60 U/L
IGA SERPL-MCNC: 110 MG/DL
IGG SERPL-MCNC: 731 MG/DL
IGM SERPL-MCNC: 114 MG/DL

## 2018-02-21 PROCEDURE — 82550 ASSAY OF CK (CPK): CPT

## 2018-02-21 PROCEDURE — 82784 ASSAY IGA/IGD/IGG/IGM EACH: CPT | Mod: 59

## 2018-02-21 PROCEDURE — 99999 PR PBB SHADOW E&M-EST. PATIENT-LVL III: CPT | Mod: PBBFAC,,, | Performed by: INTERNAL MEDICINE

## 2018-02-21 PROCEDURE — 3008F BODY MASS INDEX DOCD: CPT | Mod: S$GLB,,, | Performed by: INTERNAL MEDICINE

## 2018-02-21 PROCEDURE — 86038 ANTINUCLEAR ANTIBODIES: CPT

## 2018-02-21 PROCEDURE — 86256 FLUORESCENT ANTIBODY TITER: CPT | Mod: 91

## 2018-02-21 PROCEDURE — 36415 COLL VENOUS BLD VENIPUNCTURE: CPT | Mod: PO

## 2018-02-21 PROCEDURE — 86235 NUCLEAR ANTIGEN ANTIBODY: CPT

## 2018-02-21 PROCEDURE — 1159F MED LIST DOCD IN RCRD: CPT | Mod: S$GLB,,, | Performed by: INTERNAL MEDICINE

## 2018-02-21 PROCEDURE — 82103 ALPHA-1-ANTITRYPSIN TOTAL: CPT

## 2018-02-21 PROCEDURE — 99214 OFFICE O/P EST MOD 30 MIN: CPT | Mod: S$GLB,,, | Performed by: INTERNAL MEDICINE

## 2018-02-21 NOTE — PROGRESS NOTES
Subjective:     Violet Swenson is here for evaluation of Abnormal LFT      HPI  Violet Swenson has a h/o lymphoma and was treated with CHOP but treatment stopped as she developed a cardiomyopathy which has improved. Of note she had a pacemaker previously placed for what seems to have been issues with bradycardia.  She also has arthritis and was referred for eval of abnormal LFTs.  It seems she had one episode of increased LFTs in 2016 that was thought 2/2 a med and it resolved. She denies any known h/o liver disease or a fh of liver disease. She did have a h/o obesity and had bypass surgery many years ago.  Since starting CHOP her LFTs did start to trend up slightly but she has been off treatment and the labs continue to slowly increase with mild elevations.    She denies any new meds or Abx. She overall is feeling about her baseline and denies any active heart failure.    No evidence of liver decompensation: no ascites, confusion or GI bleeding.      Review of Systems   Constitutional: Positive for activity change and fatigue.   HENT: Negative.    Eyes: Negative.    Respiratory: Negative.    Cardiovascular: Negative.    Gastrointestinal: Negative.    Genitourinary: Negative.    Musculoskeletal: Positive for arthralgias and gait problem.   Skin: Negative.         Hair has not returned as expect post chemo   Psychiatric/Behavioral: Negative.        Objective:     Physical Exam   Constitutional: She is oriented to person, place, and time. She appears well-developed and well-nourished. No distress.   HENT:   Head: Normocephalic and atraumatic.   Mouth/Throat: Oropharynx is clear and moist. No oropharyngeal exudate.   Eyes: Conjunctivae are normal. Pupils are equal, round, and reactive to light. Right eye exhibits no discharge. Left eye exhibits no discharge. No scleral icterus.   Pulmonary/Chest: Effort normal and breath sounds normal. No respiratory distress. She has no wheezes.   Abdominal:  Soft. She exhibits no distension. There is no tenderness.   Musculoskeletal: She exhibits no edema.   Neurological: She is alert and oriented to person, place, and time.   Psychiatric: She has a normal mood and affect. Her behavior is normal.   Vitals reviewed.      Echo 8/2017  1 - Severe left atrial enlargement.     2 - Concentric hypertrophy.     3 - No wall motion abnormalities.     4 - Low normal to mildly depressed left ventricular systolic function (EF 50-55%).     5 - Restrictive LV filling pattern, indicating markedly elevated LAP (grade 3 diastolic dysfunction).     6 - Normal right ventricular systolic function .     7 - The estimated PA systolic pressure is greater than 39 mmHg.     8 - Mild mitral regurgitation.     9 - Mild tricuspid regurgitation.     MELD-Na score: 6 at 4/7/2017  4:43 AM  MELD score: 6 at 4/7/2017  4:43 AM  Calculated from:  Serum Creatinine: 1.0 mg/dL at 4/7/2017  4:43 AM  Serum Sodium: 140 mmol/L (Rounded to 137) at 4/7/2017  4:43 AM  Total Bilirubin: 0.4 mg/dL (Rounded to 1) at 4/5/2017  8:07 AM  INR(ratio): 1.0 at 4/5/2017  8:07 AM  Age: 77 years    WBC   Date Value Ref Range Status   02/08/2018 7.64 3.90 - 12.70 K/uL Final     Hemoglobin   Date Value Ref Range Status   02/08/2018 10.9 (L) 12.0 - 16.0 g/dL Final     POC Hematocrit   Date Value Ref Range Status   04/07/2017 35 (L) 36 - 54 %PCV Final     Hematocrit   Date Value Ref Range Status   02/08/2018 34.7 (L) 37.0 - 48.5 % Final     Platelets   Date Value Ref Range Status   02/08/2018 180 150 - 350 K/uL Final     BUN, Bld   Date Value Ref Range Status   02/12/2018 30 (H) 8 - 23 mg/dL Final     Creatinine   Date Value Ref Range Status   02/12/2018 1.4 0.5 - 1.4 mg/dL Final     Glucose   Date Value Ref Range Status   02/12/2018 108 70 - 110 mg/dL Final     Calcium   Date Value Ref Range Status   02/12/2018 9.0 8.7 - 10.5 mg/dL Final     Sodium   Date Value Ref Range Status   02/12/2018 146 (H) 136 - 145 mmol/L Final      Potassium   Date Value Ref Range Status   02/12/2018 5.0 3.5 - 5.1 mmol/L Final     Chloride   Date Value Ref Range Status   02/12/2018 120 (H) 95 - 110 mmol/L Final     Magnesium   Date Value Ref Range Status   04/09/2017 1.8 1.6 - 2.6 mg/dL Final     AST   Date Value Ref Range Status   02/08/2018 123 (H) 10 - 40 U/L Final     ALT   Date Value Ref Range Status   02/08/2018 92 (H) 10 - 44 U/L Final     Alkaline Phosphatase   Date Value Ref Range Status   02/08/2018 122 55 - 135 U/L Final     Total Bilirubin   Date Value Ref Range Status   02/08/2018 0.3 0.1 - 1.0 mg/dL Final     Comment:     For infants and newborns, interpretation of results should be based  on gestational age, weight and in agreement with clinical  observations.  Premature Infant recommended reference ranges:  Up to 24 hours.............<8.0 mg/dL  Up to 48 hours............<12.0 mg/dL  3-5 days..................<15.0 mg/dL  6-29 days.................<15.0 mg/dL       Albumin   Date Value Ref Range Status   02/08/2018 3.4 (L) 3.5 - 5.2 g/dL Final     INR   Date Value Ref Range Status   09/01/2017 1.0 0.8 - 1.2 Final     Comment:     Coumadin Therapy:  2.0 - 3.0 for INR for all indicators except mechanical heart valves  and antiphospholipid syndromes which should use 2.5 - 3.5.           Assessment/Plan:     1. Abnormal LFTs      Violet Swenson is a 78 y.o. female withAbnormal LFT    Abnormal LFTs- exact etiology of increase is unclear will eval further. Will also eval for myopathy given AST > ALT.  This can also be seen with hepatic congestion.  Low concern for advanced liver disease and transaminitis is mild so will continue to monitor.  -     MARÍA ELENA; Future; Expected date: 02/21/2018  -     Anti-smooth muscle antibody; Future; Expected date: 02/21/2018  -     Antimitochondrial antibody; Future; Expected date: 02/21/2018  -     Immunoglobulins (IgG, IgA, IgM) Quantitative; Future; Expected date: 02/21/2018  -     Alpha-1-antitrypsin;  Future; Expected date: 02/21/2018  -     CK; Future; Expected date: 02/21/2018  -     US Abdomen Limited; Future; Expected date: 02/21/2018  -     Comprehensive metabolic panel; Future; Expected date: 02/21/2018    RTC in 6 months with preclinic labs, b ut will repeat liver tests in 4-6 weeks      Dahiana Morales MD

## 2018-02-22 LAB
ANA SER QL IF: NORMAL
MITOCHONDRIA AB TITR SER IF: NORMAL {TITER}
SMOOTH MUSCLE AB TITR SER IF: NORMAL {TITER}

## 2018-02-28 ENCOUNTER — OFFICE VISIT (OUTPATIENT)
Dept: NEPHROLOGY | Facility: CLINIC | Age: 78
End: 2018-02-28
Payer: MEDICARE

## 2018-02-28 VITALS
SYSTOLIC BLOOD PRESSURE: 130 MMHG | WEIGHT: 158.5 LBS | HEART RATE: 64 BPM | DIASTOLIC BLOOD PRESSURE: 80 MMHG | BODY MASS INDEX: 27.06 KG/M2 | HEIGHT: 64 IN

## 2018-02-28 DIAGNOSIS — Q63.1 LOBULATED KIDNEY: ICD-10-CM

## 2018-02-28 DIAGNOSIS — N17.9 AKI (ACUTE KIDNEY INJURY): ICD-10-CM

## 2018-02-28 DIAGNOSIS — N14.0 ANALGESIC NEPHROPATHY: ICD-10-CM

## 2018-02-28 DIAGNOSIS — N18.31 CHRONIC KIDNEY DISEASE (CKD) STAGE G3A/A1, MODERATELY DECREASED GLOMERULAR FILTRATION RATE (GFR) BETWEEN 45-59 ML/MIN/1.73 SQUARE METER AND ALBUMINURIA CREATININE RATIO LESS THAN 30 MG/G: Primary | ICD-10-CM

## 2018-02-28 DIAGNOSIS — N27.0 SMALL KIDNEY, UNILATERAL: ICD-10-CM

## 2018-02-28 PROCEDURE — 99204 OFFICE O/P NEW MOD 45 MIN: CPT | Mod: S$GLB,,, | Performed by: INTERNAL MEDICINE

## 2018-02-28 PROCEDURE — 99999 PR PBB SHADOW E&M-EST. PATIENT-LVL V: CPT | Mod: PBBFAC,,, | Performed by: INTERNAL MEDICINE

## 2018-02-28 PROCEDURE — 1126F AMNT PAIN NOTED NONE PRSNT: CPT | Mod: S$GLB,,, | Performed by: INTERNAL MEDICINE

## 2018-02-28 PROCEDURE — 99499 UNLISTED E&M SERVICE: CPT | Mod: S$GLB,,, | Performed by: INTERNAL MEDICINE

## 2018-02-28 PROCEDURE — 1159F MED LIST DOCD IN RCRD: CPT | Mod: S$GLB,,, | Performed by: INTERNAL MEDICINE

## 2018-02-28 PROCEDURE — 3008F BODY MASS INDEX DOCD: CPT | Mod: S$GLB,,, | Performed by: INTERNAL MEDICINE

## 2018-02-28 NOTE — LETTER
February 28, 2018      Dilshad Rogers MD  [address] [place] [address]           [facility]  [address] [place] [address]  [address] [place] [address]  Phone: [phone]  Fax: [phone]          Patient: Violet Swenson   MR Number: [id number]   YOB: 1940   Date of Visit: 2/28/2018       Dear Dr. Dilshad Rogers:    Thank you for referring Violet Swenson to me for evaluation. Attached you will find relevant portions of my assessment and plan of care.    If you have questions, please do not hesitate to call me. I look forward to following Violet Swenson along with you.    Sincerely,    Robert Gomez MD    Enclosure  CC:  Marti Coronel MD    If you would like to receive this communication electronically, please contact [email or web address] or [phone] to request more information on [employer] Link access.    For providers and/or their staff who would like to refer a patient to [facility], please contact us through our one-stop-shop provider referral line, [facility], at [phone].    If you feel you have received this communication in error or would no longer like to receive these types of communications, please e-mail [email or web address]

## 2018-02-28 NOTE — PATIENT INSTRUCTIONS
1. Acute kidney injury on chronic kidney disease stage III: Patient has had decline in the kidney function in 2016 .currently on a lower dose of Mobic.  Will check for proteinuria.  Check renal ultrasound.  I have reviewed the CT scan from a year ago and patient has advanced atherosclerotic disease along with small atrophic kidney on the left,lobulated kidney on the right side which is enlarged.  We'll check for renal artery stenosis.if the kidney function worsens we may have to find another pain medication an have to stop Mobic at some point.    2.  Small size kidneys/atrophic kidney on the left side:check renal artery stenosis and may have to avoid ACE inhibitor in the future and use hydralazine for CHF.    3.  Check parathyroid hormone, check for vitamin D deficiency and check for proteinuria.

## 2018-02-28 NOTE — PROGRESS NOTES
Subjective:       Patient ID: Violet Swenson is a 78 y.o. White female who presents for new evaluation of Acute Renal Failure and Chronic Kidney Disease    HPI     Patient is a 78-year-old female with history of hypertension and lymphoma.  Patient had chemotherapy 2017 resulting in congestive heart failure and cardiomyopathy.  Patient has had rise in LFTs.patient had been taking Motrin which 15 mg but was seen by Dr. PANIAGUA in rheumatology who reduced the Mobic down to 7.5 mg.  Patient has been seen by Dr. Morales in hepatology for increased LFTs.    February 2018 patient evaluated for rising creatinine.  Workup ordered.  Noted left sided atrophic kidney and lobulated kidney on the right side which is enlarged based on the CT scan from 2017      Review of Systems   Constitutional: Negative for activity change, appetite change, chills, diaphoresis, fatigue, fever and unexpected weight change.   HENT: Negative for congestion, dental problem, drooling, postnasal drip, rhinorrhea and voice change.    Eyes: Negative for discharge.   Respiratory: Negative for apnea, cough, choking, chest tightness, shortness of breath, wheezing and stridor.    Cardiovascular: Negative for chest pain, palpitations and leg swelling.   Gastrointestinal: Negative for abdominal distention, blood in stool, constipation, diarrhea, nausea, rectal pain and vomiting.   Endocrine: Negative for cold intolerance, heat intolerance, polydipsia and polyuria.   Genitourinary: Negative for decreased urine volume, difficulty urinating, dysuria, enuresis, flank pain, frequency, hematuria and urgency.   Musculoskeletal: Positive for arthralgias, joint swelling and myalgias. Negative for back pain and gait problem.   Skin: Negative for rash.   Allergic/Immunologic: Negative for food allergies and immunocompromised state.   Neurological: Negative for dizziness, tremors, syncope, numbness and headaches.   Hematological: Does not bruise/bleed easily.  "  Psychiatric/Behavioral: Negative for agitation, behavioral problems and self-injury. The patient is not nervous/anxious and is not hyperactive.    All other systems reviewed and are negative.      Objective:   /80   Pulse 64   Ht 5' 4" (1.626 m)   Wt 71.9 kg (158 lb 8.2 oz)   BMI 27.21 kg/m²      Physical Exam   Constitutional: She is oriented to person, place, and time. No distress.   HENT:   Head: Normocephalic and atraumatic.   Nose: Nose normal.   Eyes: Conjunctivae and EOM are normal. Pupils are equal, round, and reactive to light.   Neck: Normal range of motion. No JVD present. No tracheal deviation present. No thyromegaly present.   Cardiovascular: Normal rate, regular rhythm, normal heart sounds and intact distal pulses.  Exam reveals no gallop and no friction rub.    No murmur heard.  Pulmonary/Chest: Effort normal and breath sounds normal. No respiratory distress. She has no wheezes. She has no rales. She exhibits no tenderness.   Abdominal: Soft. Bowel sounds are normal. She exhibits no distension and no mass. There is no tenderness. No hernia.   Musculoskeletal: Normal range of motion. She exhibits no edema, tenderness or deformity.   Neurological: She is alert and oriented to person, place, and time. She has normal reflexes. She displays normal reflexes. No cranial nerve deficit. She exhibits normal muscle tone. Coordination normal.   Skin: Skin is warm. She is not diaphoretic. No erythema. There is pallor.   Psychiatric: She has a normal mood and affect. Her behavior is normal. Judgment and thought content normal.   Nursing note and vitals reviewed.        Lab Results   Component Value Date    CREATININE 1.4 02/12/2018    BUN 30 (H) 02/12/2018     (H) 02/12/2018    K 5.0 02/12/2018     (H) 02/12/2018    CO2 18 (L) 02/12/2018     Lab Results   Component Value Date    WBC 7.64 02/08/2018    HGB 10.9 (L) 02/08/2018    HCT 34.7 (L) 02/08/2018     (H) 02/08/2018     " 02/08/2018     Lab Results   Component Value Date    CALCIUM 9.0 02/12/2018    PHOS 2.6 (L) 04/09/2017        Assessment:    )    1. Chronic kidney disease (CKD) stage G3a/A1, moderately decreased glomerular filtration rate (GFR) between 45-59 mL/min/1.73 square meter and albuminuria creatinine ratio less than 30 mg/g    2. Small kidney, unilateral    3. Lobulated kidney    4. NATHALIE (acute kidney injury)    5. Analgesic nephropathy        Plan:        1. Acute kidney injury on chronic kidney disease stage III: Patient has had decline in the kidney function in 2016 .currently on a lower dose of Mobic.  Will check for proteinuria.  Check renal ultrasound.  I have reviewed the CT scan from a year ago and patient has advanced atherosclerotic disease along with small atrophic kidney on the left,lobulated kidney on the right side which is enlarged.  We'll check for renal artery stenosis.if the kidney function worsens we may have to find another pain medication an have to stop Mobic at some point.    2.  Small size kidneys/atrophic kidney on the left side:check renal artery stenosis and may have to avoid ACE inhibitor in the future and use hydralazine for CHF.    3.  Check parathyroid hormone, check for vitamin D deficiency and check for proteinuria.    follow-up 2 months

## 2018-03-06 ENCOUNTER — TELEPHONE (OUTPATIENT)
Dept: RADIOLOGY | Facility: HOSPITAL | Age: 78
End: 2018-03-06

## 2018-03-07 ENCOUNTER — HOSPITAL ENCOUNTER (OUTPATIENT)
Dept: RADIOLOGY | Facility: HOSPITAL | Age: 78
Discharge: HOME | End: 2018-03-07
Attending: INTERNAL MEDICINE
Payer: MEDICARE

## 2018-03-07 DIAGNOSIS — N18.31 CHRONIC KIDNEY DISEASE (CKD) STAGE G3A/A1, MODERATELY DECREASED GLOMERULAR FILTRATION RATE (GFR) BETWEEN 45-59 ML/MIN/1.73 SQUARE METER AND ALBUMINURIA CREATININE RATIO LESS THAN 30 MG/G: ICD-10-CM

## 2018-03-07 DIAGNOSIS — R79.89 ABNORMAL LFTS: ICD-10-CM

## 2018-03-07 PROCEDURE — 76770 US EXAM ABDO BACK WALL COMP: CPT | Mod: 26,59,, | Performed by: RADIOLOGY

## 2018-03-07 PROCEDURE — 76770 US EXAM ABDO BACK WALL COMP: CPT | Mod: TC,PO

## 2018-03-07 PROCEDURE — 93975 VASCULAR STUDY: CPT | Mod: 26,,, | Performed by: RADIOLOGY

## 2018-03-07 PROCEDURE — 76705 ECHO EXAM OF ABDOMEN: CPT | Mod: TC,PO

## 2018-03-07 PROCEDURE — 76705 ECHO EXAM OF ABDOMEN: CPT | Mod: 26,59,, | Performed by: RADIOLOGY

## 2018-03-08 DIAGNOSIS — F51.01 PRIMARY INSOMNIA: ICD-10-CM

## 2018-03-08 DIAGNOSIS — C83.33 DIFFUSE LARGE B-CELL LYMPHOMA OF INTRA-ABDOMINAL LYMPH NODES: ICD-10-CM

## 2018-03-08 RX ORDER — MIRTAZAPINE 15 MG/1
TABLET, ORALLY DISINTEGRATING ORAL
Qty: 30 TABLET | Refills: 2 | Status: SHIPPED | OUTPATIENT
Start: 2018-03-08 | End: 2018-05-07 | Stop reason: SDUPTHER

## 2018-03-14 PROBLEM — C85.90 LYMPHOMA: Chronic | Status: ACTIVE | Noted: 2017-05-15

## 2018-03-14 PROBLEM — D69.6 THROMBOCYTOPENIA: Status: RESOLVED | Noted: 2017-06-08 | Resolved: 2018-03-14

## 2018-03-14 PROBLEM — I25.5 ISCHEMIC CARDIOMYOPATHY: Chronic | Status: ACTIVE | Noted: 2017-04-08

## 2018-03-14 PROBLEM — C83.30 DIFFUSE LARGE B CELL LYMPHOMA: Chronic | Status: ACTIVE | Noted: 2017-04-26

## 2018-03-14 PROBLEM — C85.83 LARGE CELL LYMPHOMA OF INTRA-ABDOMINAL LYMPH NODES: Chronic | Status: ACTIVE | Noted: 2017-08-17

## 2018-03-14 PROBLEM — I70.0 ATHEROSCLEROSIS OF AORTA: Status: ACTIVE | Noted: 2017-08-02

## 2018-03-14 PROBLEM — Z98.84 S/P GASTRIC BYPASS: Chronic | Status: ACTIVE | Noted: 2017-04-26

## 2018-03-14 PROBLEM — I70.0 CALCIFICATION OF AORTA: Chronic | Status: ACTIVE | Noted: 2017-08-02

## 2018-03-14 PROBLEM — N18.30 STAGE 3 CHRONIC KIDNEY DISEASE: Chronic | Status: ACTIVE | Noted: 2018-02-14

## 2018-03-14 PROBLEM — S40.022A TRAUMATIC HEMATOMA OF LEFT UPPER ARM: Status: RESOLVED | Noted: 2017-06-08 | Resolved: 2018-03-14

## 2018-03-14 PROBLEM — D64.9 CHRONIC ANEMIA: Chronic | Status: ACTIVE | Noted: 2017-04-26

## 2018-03-14 PROBLEM — I50.9 CHF (CONGESTIVE HEART FAILURE): Chronic | Status: ACTIVE | Noted: 2017-08-17

## 2018-03-14 PROBLEM — R94.2 DIFFUSION CAPACITY OF LUNG (DL), DECREASED: Status: ACTIVE | Noted: 2017-08-02

## 2018-03-15 ENCOUNTER — OFFICE VISIT (OUTPATIENT)
Dept: INTERNAL MEDICINE | Facility: CLINIC | Age: 78
End: 2018-03-15
Payer: MEDICARE

## 2018-03-15 VITALS
OXYGEN SATURATION: 98 % | HEIGHT: 64 IN | BODY MASS INDEX: 26.95 KG/M2 | SYSTOLIC BLOOD PRESSURE: 132 MMHG | WEIGHT: 157.88 LBS | HEART RATE: 83 BPM | DIASTOLIC BLOOD PRESSURE: 78 MMHG

## 2018-03-15 DIAGNOSIS — D64.9 CHRONIC ANEMIA: Chronic | ICD-10-CM

## 2018-03-15 DIAGNOSIS — I70.0 CALCIFICATION OF AORTA: Chronic | ICD-10-CM

## 2018-03-15 DIAGNOSIS — Z86.79 HISTORY OF CONGESTIVE HEART FAILURE: ICD-10-CM

## 2018-03-15 DIAGNOSIS — I25.10 CORONARY ARTERY DISEASE INVOLVING NATIVE CORONARY ARTERY OF NATIVE HEART WITHOUT ANGINA PECTORIS: Chronic | ICD-10-CM

## 2018-03-15 DIAGNOSIS — E78.2 MIXED HYPERLIPIDEMIA: ICD-10-CM

## 2018-03-15 DIAGNOSIS — Z00.00 ENCOUNTER FOR PREVENTIVE HEALTH EXAMINATION: Primary | ICD-10-CM

## 2018-03-15 DIAGNOSIS — I25.5 ISCHEMIC CARDIOMYOPATHY: Chronic | ICD-10-CM

## 2018-03-15 DIAGNOSIS — Z95.0 PACEMAKER: Chronic | ICD-10-CM

## 2018-03-15 DIAGNOSIS — I10 ESSENTIAL HYPERTENSION: Chronic | ICD-10-CM

## 2018-03-15 DIAGNOSIS — M1A.09X0 IDIOPATHIC CHRONIC GOUT OF MULTIPLE SITES WITHOUT TOPHUS: Chronic | ICD-10-CM

## 2018-03-15 DIAGNOSIS — F51.05 INSOMNIA SECONDARY TO ANXIETY: ICD-10-CM

## 2018-03-15 DIAGNOSIS — G62.0 CHEMOTHERAPY-INDUCED NEUROPATHY: ICD-10-CM

## 2018-03-15 DIAGNOSIS — I48.0 PAROXYSMAL ATRIAL FIBRILLATION: ICD-10-CM

## 2018-03-15 DIAGNOSIS — E03.9 HYPOTHYROIDISM (ACQUIRED): Chronic | ICD-10-CM

## 2018-03-15 DIAGNOSIS — Z96.653 S/P TKR (TOTAL KNEE REPLACEMENT), BILATERAL: ICD-10-CM

## 2018-03-15 DIAGNOSIS — T45.1X5A CHEMOTHERAPY-INDUCED NEUROPATHY: ICD-10-CM

## 2018-03-15 DIAGNOSIS — F32.9 MAJOR DEPRESSION, CHRONIC: ICD-10-CM

## 2018-03-15 DIAGNOSIS — M85.80 OSTEOPENIA, UNSPECIFIED LOCATION: ICD-10-CM

## 2018-03-15 DIAGNOSIS — F41.9 INSOMNIA SECONDARY TO ANXIETY: ICD-10-CM

## 2018-03-15 DIAGNOSIS — C85.83 LARGE CELL LYMPHOMA OF INTRA-ABDOMINAL LYMPH NODES: Chronic | ICD-10-CM

## 2018-03-15 DIAGNOSIS — Z98.84 S/P GASTRIC BYPASS: Chronic | ICD-10-CM

## 2018-03-15 PROBLEM — D70.1 CHEMOTHERAPY INDUCED NEUTROPENIA: Status: RESOLVED | Noted: 2017-07-24 | Resolved: 2018-03-15

## 2018-03-15 PROBLEM — I42.7 CARDIOMYOPATHY DUE TO CHEMOTHERAPY: Status: ACTIVE | Noted: 2017-08-17

## 2018-03-15 PROCEDURE — 99499 UNLISTED E&M SERVICE: CPT | Mod: S$GLB,,, | Performed by: INTERNAL MEDICINE

## 2018-03-15 PROCEDURE — G0439 PPPS, SUBSEQ VISIT: HCPCS | Mod: S$GLB,,, | Performed by: INTERNAL MEDICINE

## 2018-03-15 PROCEDURE — 99999 PR PBB SHADOW E&M-EST. PATIENT-LVL V: CPT | Mod: PBBFAC,,, | Performed by: INTERNAL MEDICINE

## 2018-03-15 NOTE — PROGRESS NOTES
"Violet Swenson presented for a  Medicare AWV and comprehensive Health Risk Assessment today. The following components were reviewed and updated:    · Medical history  · Family History  · Social history  · Allergies and Current Medications  · Health Risk Assessment  · Health Maintenance  · Care Team     ** See Completed Assessments for Annual Wellness Visit within the encounter summary.**       The following assessments were completed:  · Living Situation  · CAGE  · Depression Screening  · Timed Get Up and Go  · Whisper Test  · Cognitive Function Screening  · Nutrition Screening  · ADL Screening  · PAQ Screening        Physical Exam      PE:   /78 (BP Location: Left arm, Patient Position: Sitting, BP Method: Medium (Manual))   Pulse 83   Ht 5' 4" (1.626 m)   Wt 71.6 kg (157 lb 13.6 oz)   SpO2 98%   BMI 27.09 kg/m²     APPEARANCE: Patient is a 78 y.o.female /White . Awake,elderly,  alert, in no distress, good historian.   HEENT: PERRLA, EOMI. No facial asymmetry.Tongue midline.   NECK: Supple. Carotids: 2+, no bruits. No thyromegaly. No cervical adenopathy.   HEART: Regular rate and rhythm with  No murmur , rubs or gallops. Pacemaker left anterior chest.   CHEST: Lungs clear to auscultation.   BACK:  No spinous tenderness. No flank tenderness.   ABDOMEN: Bowel sounds normal. Not distended. Soft.   EXTREMITIES: No clubbing or cyanosis.  No edema . Peripheral pulses intact.  Moderate varicosities.  NEURO:  Hearing intact.    Motor: Symmetric  grasp, flexion and extension of feet. Toes downgoing. Sensory decreased to monofilament testing, symmetric hands and feet. Finger to nose is intact with left intention tremor. No spasticity or muscle fasciculations. Gait: rolling walker.   SKIN:  No suspicious lesions or ulcerations.         Diagnoses and health risks identified today and associated recommendations/orders:    1. Encounter for preventive health examination  Completed. Information verbal and " printed about Living will and POA. script for Boostrix immunization today and to discuss ? Shingrix immunization later with Dr Matson.    2. 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  R CHOP chemo halted for cardiotoxicity and also had significant peripheral neuropathy as well. Continue current treatment plan as previously prescribed with your Oncologist, Dr Matson.     3. Coronary artery disease : multiple vessels, s/p PTCA  Stable and controlled. Continue current treatment plan as previously prescribed with your cardiologist, Dr Gil.     4. Essential hypertension  Stable and controlled. Discussed with patient that 132 systolic is higher than the new target of 130 though. Continue current treatment plan as previously prescribed with your physicians.     5. Mixed hyperlipidemia  Stable and controlled on rosuvastatin. Continue current treatment plan as previously prescribed with your PCP.   Comp    Ref Rng  4/5/2017   Chol   120 - 199 mg/dL 138   Trig     30 - 150 mg/dL 86   HDL    40 - 75 mg/dL 58   LDL   63.0 - 159.0 mg/dL 62.8 (L)     6. Hypothyroidism (acquired)  Stable and controlled on levothyroxine. Continue current treatment plan as previously prescribed with your PCP.   Component      Latest Ref Rng & Units 12/18/2017   TSH      0.400 - 4.000 uIU/mL 2.936     7. Major depression, chronic  Stable and controlled on sertraline. Continue current treatment plan as previously prescribed with your PCP.     8. Calcification of aorta  Stable and controlled. Noted on 8/17/17 CT chest: ..Mild to moderate vascular calcification seen involving the aorta.  Prominent coronary artery calcifications noted. Not an emergent problem. Discussed need to control BP, Lipids, glucose and not smoke- to avoid further arterial wall buildup. Already on - anti platelet agens: aspirin and clopidogrel. Continue current treatment plan as previously prescribed with your physicians.     9.  Chemotherapy-induced neuropathy  Stable and controlled with gabapentin. Continue current treatment plan as previously prescribed with your PCP.     10. Ischemic cardiomyopathy  Stable and controlled. On losartan, long acting nitrate, Aspirin,Plavix and lasix. Continue current treatment plan as previously prescribed with your cardiologist.   Lowered LVEF occurred prior to initiation of chemotherapy for lymphoma. Diagnosis made from biopsy collected 4/6/17.    Component      Latest Ref Rng  8/8/17 8/4/17 5/3/17 4/5/17   EF    55 - 65  50 55 40 (A)   Mitral Valve Regurg  MILD TRIVIAL-MILD MILD   Diastolic Dysfunc       No Yes (A)  Yes (A)   Est. PA Systolic P        39.44 23.79 29.21   Tricusp Valve Regurg        MILD TRIVIAL      11. Paroxysmal Atrial Fibrillation  Stable and controlled. On aspirin, Plavix , and metoprolol. Noted when pacemaker was interrogated in April 2014 at the time + CHF with LVEF 40% and Diastolic Dysfunction noted. Continue current treatment plan as previously prescribed with your cardiologist.     12. Chronic anemia  Stable and controlled. Continue current treatment plan as previously prescribed with your physicians.    Ref Range  2/8/18 12/14/17 11/7/17 9/14/17 9/1/17 8/2/17   Hgb 12.0 - 16.0 g/dL 10.9   10.5   11.0   10.2   10.7   9.9     Hct 37.0 - 48.5 % 34.7   33.0   34.5   32.7   34.3   31.4             13. Pacemaker  Stable and controlled. Placed in 2015 for complete heart block. Continue current treatment plan as previously prescribed with your cardiologist.     14. Insomnia secondary to anxiety  Stable and controlled on Remeron and also uses alprazolam prn. Continue current treatment plan as previously prescribed with your PCP.     15. Idiopathic chronic gout of multiple sites without tophus  Stable and controlled on allopurinol and colchicine. Continue current treatment plan as previously prescribed with your rheumatologist ,Dr Rogers..     16. Osteopenia, unspecified  location  Stable and controlled on Vitamin D supplementation.  Continue current treatment plan as previously prescribed with your PCP.     17. S/P gastric bypass  Stable and controlled. Continue current treatment plan as previously prescribed with your PCP.     18. S/P TKR (total knee replacement), bilateral  Stable and controlled. Continue current treatment plan as previously prescribed with your physicians.     Provided Violet Swenson with a 5-10 year written screening schedule and personal prevention plan. Recommendations were developed using the USPSTF age appropriate recommendations. Education, counseling, and referrals were provided as needed. After Visit Summary printed and given to patient which includes a list of additional screenings\tests needed.    Follow-up in about 1 year (around 3/15/2019) for annual annual wellness.    Grisel Draper MD

## 2018-03-15 NOTE — PATIENT INSTRUCTIONS
Counseling and Referral of Other Preventative  (Italic type indicates deductible and co-insurance are waived)    Patient Name: Violet Swenson  Today's Date: 3/15/2018    Health Maintenance       Date Due Completion Date    TETANUS VACCINE 02/15/1959 ---    Zoster Vaccine 02/15/2001 ---    Colonoscopy 04/06/2018 4/6/2017    Override on 2/1/2016: Not Clinically Appropriate    DEXA SCAN 10/25/2020 10/25/2017    Override on 4/29/2014: Done (Exact date unknown--normal per patient)    Lipid Panel 04/05/2022 4/5/2017        Script for Boostrix given, She doesn't think any childhood immunizations done. Will discuss later Shingrix with Dr Matson  The following information is provided to all patients.  This information is to help you find resources for any of the problems found today that may be affecting your health:                Living healthy guide: www.[facility].[place].gov      Understanding Diabetes: www.diabetes.org      Eating healthy: www.cdc.gov/healthyweight      [facility] home safety checklist: www.cdc.gov/steadi/patient.html      Agency on Aging: www.goea.[place].[facility]      Alcoholics anonymous (AA): www.aa.org      Physical Activity: www.sivakumar.nih.gov/dj2tqjz      Tobacco use: www.quitwithusla.org

## 2018-03-21 DIAGNOSIS — C85.83 LARGE CELL LYMPHOMA OF INTRA-ABDOMINAL LYMPH NODES: Primary | Chronic | ICD-10-CM

## 2018-03-28 DIAGNOSIS — C85.83 LARGE CELL LYMPHOMA OF INTRA-ABDOMINAL LYMPH NODES: ICD-10-CM

## 2018-03-28 DIAGNOSIS — G62.9 NEUROPATHY: ICD-10-CM

## 2018-03-29 RX ORDER — GABAPENTIN 100 MG/1
CAPSULE ORAL
Qty: 180 CAPSULE | Refills: 1 | Status: SHIPPED | OUTPATIENT
Start: 2018-03-29 | End: 2018-05-07 | Stop reason: SDUPTHER

## 2018-04-04 DIAGNOSIS — F41.8 SITUATIONAL ANXIETY: ICD-10-CM

## 2018-04-04 DIAGNOSIS — F41.9 INSOMNIA SECONDARY TO ANXIETY: ICD-10-CM

## 2018-04-04 DIAGNOSIS — F32.9 MAJOR DEPRESSION, CHRONIC: ICD-10-CM

## 2018-04-04 DIAGNOSIS — F51.05 INSOMNIA SECONDARY TO ANXIETY: ICD-10-CM

## 2018-04-05 ENCOUNTER — LAB VISIT (OUTPATIENT)
Dept: LAB | Facility: HOSPITAL | Age: 78
End: 2018-04-05
Attending: INTERNAL MEDICINE
Payer: MEDICARE

## 2018-04-05 ENCOUNTER — HOSPITAL ENCOUNTER (OUTPATIENT)
Dept: RADIOLOGY | Facility: HOSPITAL | Age: 78
Discharge: HOME | End: 2018-04-05
Attending: INTERNAL MEDICINE
Payer: MEDICARE

## 2018-04-05 ENCOUNTER — INFUSION (OUTPATIENT)
Dept: INFUSION THERAPY | Facility: HOSPITAL | Age: 78
End: 2018-04-05
Attending: INTERNAL MEDICINE
Payer: MEDICARE

## 2018-04-05 ENCOUNTER — OFFICE VISIT (OUTPATIENT)
Dept: HEMATOLOGY/ONCOLOGY | Facility: CLINIC | Age: 78
End: 2018-04-05
Payer: MEDICARE

## 2018-04-05 VITALS
TEMPERATURE: 97 F | OXYGEN SATURATION: 95 % | DIASTOLIC BLOOD PRESSURE: 89 MMHG | SYSTOLIC BLOOD PRESSURE: 133 MMHG | BODY MASS INDEX: 27.25 KG/M2 | HEART RATE: 88 BPM | WEIGHT: 159.63 LBS | HEIGHT: 64 IN

## 2018-04-05 DIAGNOSIS — G62.0 CHEMOTHERAPY-INDUCED NEUROPATHY: Chronic | ICD-10-CM

## 2018-04-05 DIAGNOSIS — D53.9 MACROCYTIC ANEMIA: ICD-10-CM

## 2018-04-05 DIAGNOSIS — T45.1X5A CHEMOTHERAPY-INDUCED NEUROPATHY: Chronic | ICD-10-CM

## 2018-04-05 DIAGNOSIS — C85.83 LARGE CELL LYMPHOMA OF INTRA-ABDOMINAL LYMPH NODES: Primary | Chronic | ICD-10-CM

## 2018-04-05 DIAGNOSIS — C85.80 LYMPHOMA MALIGNANT, LARGE CELL: ICD-10-CM

## 2018-04-05 DIAGNOSIS — R91.8 PULMONARY INFILTRATES: ICD-10-CM

## 2018-04-05 DIAGNOSIS — C85.83 LARGE CELL LYMPHOMA OF INTRA-ABDOMINAL LYMPH NODES: Chronic | ICD-10-CM

## 2018-04-05 LAB
ALBUMIN SERPL BCP-MCNC: 3.4 G/DL
ALBUMIN SERPL BCP-MCNC: 3.4 G/DL
ALP SERPL-CCNC: 96 U/L
ALP SERPL-CCNC: 96 U/L
ALT SERPL W/O P-5'-P-CCNC: 66 U/L
ALT SERPL W/O P-5'-P-CCNC: 66 U/L
ANION GAP SERPL CALC-SCNC: 5 MMOL/L
ANION GAP SERPL CALC-SCNC: 5 MMOL/L
AST SERPL-CCNC: 68 U/L
AST SERPL-CCNC: 68 U/L
BASOPHILS # BLD AUTO: 0.02 K/UL
BASOPHILS NFR BLD: 0.2 %
BILIRUB SERPL-MCNC: 0.3 MG/DL
BILIRUB SERPL-MCNC: 0.3 MG/DL
BUN SERPL-MCNC: 25 MG/DL
BUN SERPL-MCNC: 25 MG/DL
CALCIUM SERPL-MCNC: 9.1 MG/DL
CALCIUM SERPL-MCNC: 9.1 MG/DL
CHLORIDE SERPL-SCNC: 116 MMOL/L
CHLORIDE SERPL-SCNC: 116 MMOL/L
CO2 SERPL-SCNC: 21 MMOL/L
CO2 SERPL-SCNC: 21 MMOL/L
CREAT SERPL-MCNC: 1.1 MG/DL
CREAT SERPL-MCNC: 1.1 MG/DL
DIFFERENTIAL METHOD: ABNORMAL
EOSINOPHIL # BLD AUTO: 0.2 K/UL
EOSINOPHIL NFR BLD: 2.3 %
ERYTHROCYTE [DISTWIDTH] IN BLOOD BY AUTOMATED COUNT: 17.2 %
EST. GFR  (AFRICAN AMERICAN): 56 ML/MIN/1.73 M^2
EST. GFR  (AFRICAN AMERICAN): 56 ML/MIN/1.73 M^2
EST. GFR  (NON AFRICAN AMERICAN): 49 ML/MIN/1.73 M^2
EST. GFR  (NON AFRICAN AMERICAN): 49 ML/MIN/1.73 M^2
GLUCOSE SERPL-MCNC: 80 MG/DL
GLUCOSE SERPL-MCNC: 80 MG/DL
HCT VFR BLD AUTO: 33.1 %
HGB BLD-MCNC: 10.4 G/DL
LYMPHOCYTES # BLD AUTO: 3.2 K/UL
LYMPHOCYTES NFR BLD: 39.3 %
MCH RBC QN AUTO: 32.4 PG
MCHC RBC AUTO-ENTMCNC: 31.4 G/DL
MCV RBC AUTO: 103 FL
MONOCYTES # BLD AUTO: 0.7 K/UL
MONOCYTES NFR BLD: 8.1 %
NEUTROPHILS # BLD AUTO: 4.1 K/UL
NEUTROPHILS NFR BLD: 50.1 %
PLATELET # BLD AUTO: 207 K/UL
PMV BLD AUTO: 9.6 FL
POTASSIUM SERPL-SCNC: 5.4 MMOL/L
POTASSIUM SERPL-SCNC: 5.4 MMOL/L
PROT SERPL-MCNC: 6.2 G/DL
PROT SERPL-MCNC: 6.2 G/DL
RBC # BLD AUTO: 3.21 M/UL
SODIUM SERPL-SCNC: 142 MMOL/L
SODIUM SERPL-SCNC: 142 MMOL/L
WBC # BLD AUTO: 8.19 K/UL

## 2018-04-05 PROCEDURE — 25500020 PHARM REV CODE 255: Performed by: INTERNAL MEDICINE

## 2018-04-05 PROCEDURE — 3079F DIAST BP 80-89 MM HG: CPT | Mod: CPTII,S$GLB,, | Performed by: INTERNAL MEDICINE

## 2018-04-05 PROCEDURE — 36415 COLL VENOUS BLD VENIPUNCTURE: CPT

## 2018-04-05 PROCEDURE — 99999 PR PBB SHADOW E&M-EST. PATIENT-LVL IV: CPT | Mod: PBBFAC,,, | Performed by: INTERNAL MEDICINE

## 2018-04-05 PROCEDURE — 80053 COMPREHEN METABOLIC PANEL: CPT

## 2018-04-05 PROCEDURE — 36598 INJ W/FLUOR EVAL CV DEVICE: CPT

## 2018-04-05 PROCEDURE — 3075F SYST BP GE 130 - 139MM HG: CPT | Mod: CPTII,S$GLB,, | Performed by: INTERNAL MEDICINE

## 2018-04-05 PROCEDURE — 85025 COMPLETE CBC W/AUTO DIFF WBC: CPT

## 2018-04-05 PROCEDURE — 99214 OFFICE O/P EST MOD 30 MIN: CPT | Mod: S$GLB,,, | Performed by: INTERNAL MEDICINE

## 2018-04-05 PROCEDURE — 99499 UNLISTED E&M SERVICE: CPT | Mod: S$GLB,,, | Performed by: INTERNAL MEDICINE

## 2018-04-05 RX ORDER — SERTRALINE HYDROCHLORIDE 100 MG/1
TABLET, FILM COATED ORAL
Qty: 90 TABLET | Refills: 1 | Status: SHIPPED | OUTPATIENT
Start: 2018-04-05 | End: 2018-09-24 | Stop reason: SDUPTHER

## 2018-04-05 RX ORDER — HEPARIN 100 UNIT/ML
5 SYRINGE INTRAVENOUS
Status: DISCONTINUED | OUTPATIENT
Start: 2018-04-05 | End: 2018-04-06 | Stop reason: HOSPADM

## 2018-04-05 RX ADMIN — IOHEXOL 10 ML: 300 INJECTION, SOLUTION INTRAVENOUS at 04:04

## 2018-04-05 NOTE — NURSING
210pm pt arrives for port flush.  Port to right chest wall accessed per protocol see flowsheet.  Unable to obtain blood return.  However flushes with normal saline and patient immediately complains of sticking and pain all the way along catheter pathway up into her jaw line. Dr matson made aware and arrangements made for patient to have a flouro next door at the hospital in radiology now.  Dressing applied and pt escorted to [facility] to admissions and to follow through with port fluroscopy.  Dr. Matson to notify patient of report.  Hospital nurse will flush with heparin in possible and remove nazario following procedure.

## 2018-04-05 NOTE — PROGRESS NOTES
Subjective:       Patient ID: Violet Swenson is a 78 y.o. female.    Chief Complaint: No chief complaint on file.    HPI This 78 year old  lady  comes for follow up of her th diffuse large cell lymphoma.   A CT of   the abdomen done on 04/05/2017, done because of abdominal pain ,  was reported as showing a 6.9 x 7.0 x 8.4 cm soft  tissue mass in the right lower quadrant in the terminal ileum abutting the   cecum. There was adjacent conglomerate adenopathy in the right lower quadrant   mesentery.     The patient had a colonoscopy that showed a lesion in the terminal ileum.     She underwent a right hemicolectomy and terminal ileum removal. 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  She had a Ct/PET that showed evidence of surgery and some non specific findings, but no evidence of activer disease elsewhere.  An ECHO showed normal cardiac ejection fraction, as wella s a MUGA.  She was seen cardiology and cleared for ADRIAMYCIN administration.  She was negative for Hep B/C and HIV  Bone marrow was negative. S    The patient started  R-CHOP. Ttreatmtnet    She tolerated the treatment with some minor difficulties. After 3 cycles, she had a repeat CT/PET  There was no activity in the abdomen.  There was development of ground glass opacities/infiltrates in both lungs which were hypermetabolic although the SUV was not reported.  We discussed the imaging studies with Radiology and Pulmonary.  Dr Corral of the Pulmonary Department favored the findings to be due to pulmonary congestion.  Her troponin was  normal, but her BNP was markedly elevated at 1,103.  She was started on Lasix by dr Corral and asked to have a  Ct in few weeks  The patient   had evaluation by Cardiology ( dr Gil).It was felt had developed CHF., with a decrease in her ejection raction to 47%., elevated BNP and imaging studies.  The patient indicated her leg  edema and SOB   improved on lasix.  Repeat PET showed clearance of all the infiltrates  Since, she has had a complete work including cardiac catheterization wirh negative findings.  A repeat CB/PET done  showed   clearance of the previous infiltartes       She comes for follow up. She had a CT/PET in 2018 that shows no evidence of recurrence  She has neuropathy of hands, with numbness and some pain.  She is on Neurontin 200 mg tid   ALLERGIES:see med acrd     MEDICATIONS: See MedCard.     PREVIOUS SURGERIES: Appendectomy in 1968, tonsillectomy at age 19, gastric   bypass with incidental cholecystectomy, bilateral knee replacement in .     SOCIAL HISTORY: She is . She had two natural children, and one adopted   one. The adopted child has . She lives in [place] with her   . She smoked for two years, averaging a pack a day. She stopped 35   years ago or so. Denies any alcohol intake. She worked in [employer].     FAMILY HISTORY: Father  of colon cancer. Younger brother had leukemia that  transform into a lymphoma. Sister had pancreatic cancerhis   Review of Systems   Constitutional: Negative.    HENT: Negative.    Eyes: Negative.    Respiratory: Negative.  Negative for cough and wheezing.    Cardiovascular: Negative.  Negative for chest pain.   Gastrointestinal: Negative.    Genitourinary: Negative.    Neurological: Negative.    Psychiatric/Behavioral: Negative.        Objective:      Physical Exam   Constitutional: She is oriented to person, place, and time. She appears well-developed. No distress.   HENT:   Head: Normocephalic.   Right Ear: Tympanic membrane, external ear and ear canal normal.   Left Ear: Tympanic membrane, external ear and ear canal normal.   Nose: Nose normal. Right sinus exhibits no maxillary sinus tenderness and no frontal sinus tenderness. Left sinus exhibits no maxillary sinus tenderness and no frontal sinus tenderness.   Mouth/Throat: Oropharynx is clear and moist and mucous membranes  are normal.   Teeth normal.  Gums normal.   Eyes: Conjunctivae and lids are normal. Pupils are equal, round, and reactive to light.   Neck: Normal carotid pulses, no hepatojugular reflux and no JVD present. Carotid bruit is not present. No tracheal deviation present. No thyroid mass and no thyromegaly present.   Cardiovascular: Normal rate, regular rhythm, S1 normal, S2 normal, normal heart sounds and intact distal pulses.  Exam reveals no gallop and no friction rub.    No murmur heard.  Carotid exam normal   Pulmonary/Chest: Effort normal and breath sounds normal. No accessory muscle usage. No respiratory distress. She has no wheezes. She has no rales. She exhibits no tenderness.   Abdominal: Soft. Normal appearance. She exhibits no distension and no mass. There is no splenomegaly or hepatomegaly. There is no tenderness. There is no rebound and no guarding.   Musculoskeletal: Normal range of motion. She exhibits no edema or tenderness.        Right hand: Normal.        Left hand: Normal.       Lymphadenopathy:     She has no cervical adenopathy.     She has no axillary adenopathy.        Right: No inguinal and no supraclavicular adenopathy present.        Left: No inguinal and no supraclavicular adenopathy present.   Neurological: She is alert and oriented to person, place, and time. She has normal strength. No cranial nerve deficit. Coordination normal.   Skin: Skin is warm and dry. No rash noted. She is not diaphoretic. No cyanosis or erythema. No pallor. Nails show no clubbing.   Psychiatric: She has a normal mood and affect. Her behavior is normal. Judgment and thought content normal.       Wt Readings from Last 3 Encounters:   04/05/18 72.4 kg (159 lb 9.8 oz)   03/15/18 71.6 kg (157 lb 13.6 oz)   02/28/18 71.9 kg (158 lb 8.2 oz)     Temp Readings from Last 3 Encounters:   04/05/18 97.3 °F (36.3 °C)   02/08/18 97.7 °F (36.5 °C)   01/09/18 97.7 °F (36.5 °C)     BP Readings from Last 3 Encounters:   04/05/18  133/89   03/15/18 132/78   02/28/18 130/80     Pulse Readings from Last 3 Encounters:   04/05/18 88   03/15/18 83   02/28/18 64       Assessment:       1. 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  See me in 3months with a cbc, cmp, ferritin, tibc, B12 and folate level,a s well as a CT/PET   Continue same dose of Neurontin. May require change to Lyrica,  ADDENDUM:  Afetr she finished visiting with me, she went for a med-port flush. She complained of pain on infusion of saline with no venous return.  We sent her for   Evaluation.under fluoroscopy at the Hospital  Dr Abdullahi called me to let me know  the medi-port seems to be out of the vein  He offered to remove the Medi-port tomorrow and I asked him to go ahead with it

## 2018-04-12 ENCOUNTER — SURGERY (OUTPATIENT)
Age: 78
End: 2018-04-12

## 2018-04-12 ENCOUNTER — HOSPITAL ENCOUNTER (OUTPATIENT)
Facility: HOSPITAL | Age: 78
Discharge: HOME | End: 2018-04-12
Attending: RADIOLOGY | Admitting: RADIOLOGY
Payer: MEDICARE

## 2018-04-12 VITALS
HEIGHT: 64 IN | TEMPERATURE: 98 F | OXYGEN SATURATION: 99 % | HEART RATE: 65 BPM | RESPIRATION RATE: 16 BRPM | BODY MASS INDEX: 27.14 KG/M2 | DIASTOLIC BLOOD PRESSURE: 63 MMHG | SYSTOLIC BLOOD PRESSURE: 134 MMHG | WEIGHT: 159 LBS

## 2018-04-12 DIAGNOSIS — F51.01 PRIMARY INSOMNIA: ICD-10-CM

## 2018-04-12 DIAGNOSIS — C19 COLORECTAL CANCER: ICD-10-CM

## 2018-04-12 DIAGNOSIS — C83.33 DIFFUSE LARGE B-CELL LYMPHOMA OF INTRA-ABDOMINAL LYMPH NODES: ICD-10-CM

## 2018-04-12 DIAGNOSIS — C85.90 LYMPHOMA, UNSPECIFIED BODY REGION, UNSPECIFIED LYMPHOMA TYPE: Primary | ICD-10-CM

## 2018-04-12 PROCEDURE — 25000003 PHARM REV CODE 250

## 2018-04-12 PROCEDURE — 27200137 EP LAB PROCEDURE

## 2018-04-12 PROCEDURE — 63600175 PHARM REV CODE 636 W HCPCS

## 2018-04-12 NOTE — DISCHARGE SUMMARY
Radiology Discharge Summary      Hospital Course: No complications    Admit Date: 4/12/2018  Discharge Date: 04/12/2018     Instructions Given to Patient: Yes  Diet: regular diet and Resume prior diet  Activity: no heavy lifting, pushing, pulling with the implant side for 2 weeks    Description of Condition on Discharge: Stable  Vital Signs (Most Recent): Temp: 97.7 °F (36.5 °C) (04/12/18 1152)  Pulse: 76 (04/12/18 1152)  Resp: 20 (04/12/18 1152)  BP: (!) 141/82 (04/12/18 1152)  SpO2: 98 % (04/12/18 1152)    Discharge Disposition: Home    Discharge Diagnosis: port dysfunction     Follow-up: with Dr. Matson as scheduled    Nima Abdullahi MD  Staff Radiologist  Department of Radiology  Pager: [phone]

## 2018-04-12 NOTE — H&P
Radiology History & Physical      SUBJECTIVE:     Chief Complaint: malfunctioning chest port    History of Present Illness:  Violet Swenson is a 78 y.o. 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    Past Medical History:   Diagnosis Date    Anemia     Anxiety     Arthritis     Cancer     lymphoma Large cell B    Coronary artery disease     01/2015 [facility] patent LCX. 50% stenosis in LAD and RCA.      Depression     Disorder of kidney and ureter     Encounter for blood transfusion     GERD (gastroesophageal reflux disease)     Gout, arthritis     Heart failure     Hx of psychiatric care     Hyperlipidemia     Hypertension     Hypothyroidism     Immune deficiency disorder     Lung nodule 2014    RML--stable    Obesity     Pacemaker     Metronic    Paroxysmal atrial fibrillation 3/15/2018    Pneumonia     Polyneuropathy     chemo induced    Psychiatric problem     Tobacco dependence     quit 1976    Trouble in sleeping      Past Surgical History:   Procedure Laterality Date    APPENDECTOMY  1966 approx    CARDIAC PACEMAKER PLACEMENT  01/22/2015    CHOLECYSTECTOMY  1993    incidental at time of gastric bypass    COLON SURGERY Right 2017    hemicolectomy    COLONOSCOPY N/A 4/6/2017    Procedure: COLONOSCOPY;  Surgeon: Tye Enamorado MD;  Location: [facility];  Service: Endoscopy;  Laterality: N/A;    CORONARY ANGIOPLASTY  02/2014    CORONARY STENT PLACEMENT  02/05/2014    GASTRIC BYPASS  1993    with incidental choly    HERNIA REPAIR      JOINT REPLACEMENT Bilateral 2009    3 months apart    TONSILLECTOMY  1959       Home Meds:   Prior to Admission medications    Medication Sig Start Date End Date Taking? Authorizing Provider   allopurinol (ZYLOPRIM) 300 MG tablet Take 1 tablet (300 mg total) by mouth once daily. 2/14/18  Yes Dilshad Rogers MD   ascorbic acid, vitamin C, (VITAMIN C) 100 MG tablet  Take by mouth once daily.   Yes Historical Provider, MD   aspirin (ECOTRIN) 81 MG EC tablet Take 81 mg by mouth nightly.    Yes Historical Provider, MD   clopidogrel (PLAVIX) 75 mg tablet TAKE ONE TABLET BY MOUTH EVERY DAY 1/10/18  Yes Nader Gil MD   COLCRYS 0.6 mg tablet Take 1 tablet (0.6 mg total) by mouth once daily. 2/14/18 2/14/19 Yes Dilshad Rogers MD   diclofenac sodium 1 % Gel Apply 2 g topically once daily. Apply 2 g over painful joints once or twice a day. 2/14/18  Yes Dilshad Rogers MD   ergocalciferol, vitamin D2, 400 unit Tab Take 1 tablet by mouth once daily.    Yes Historical Provider, MD   fluticasone (FLONASE) 50 mcg/actuation nasal spray 2 sprays by Each Nare route once daily. 12/18/17  Yes Marti Coronel MD   furosemide (LASIX) 20 MG tablet Take 1 tablet (20 mg total) by mouth once daily. 9/28/17 9/28/18 Yes Nader Gil MD   gabapentin (NEURONTIN) 100 MG capsule TAKE TWO CAPSULES BY MOUTH THREE TIMES DAILY 3/29/18  Yes Jose A Prescott Jr., MD   isosorbide mononitrate (IMDUR) 30 MG 24 hr tablet Take 1 tablet (30 mg total) by mouth nightly. 11/1/17  Yes Nader Gil MD   levothyroxine (SYNTHROID) 75 MCG tablet TAKE 1 TABLET(75 MCG) BY MOUTH BEFORE BREAKFAST 12/18/17  Yes Marti Coronel MD   loratadine (CLARITIN) 10 mg tablet Take 1 tablet (10 mg total) by mouth once daily. 12/18/17 12/18/18 Yes Marti Coronel MD   losartan (COZAAR) 25 MG tablet Take 1 tablet (25 mg total) by mouth once daily. 11/1/17 11/1/18 Yes Nader Gil MD   meloxicam (MOBIC) 7.5 MG tablet TAKE ONE TABLET BY MOUTH EVERY DAY AS NEEDED FOR PAIN 11/22/17  Yes Dilshad Rogers MD   metoprolol tartrate (LOPRESSOR) 25 MG tablet Take 1 tablet (25 mg total) by mouth 2 (two) times daily. 8/4/17 8/4/18 Yes Nader Gil MD   mirtazapine (REMERON SOL-TAB) 15 MG disintegrating tablet DISSOLVE ONE TABLET BY MOUTH NIGHTLY 3/8/18  Yes Da Matson MD   multivitamin (ONE DAILY  MULTIVITAMIN) per tablet Take 1 tablet by mouth once daily.   Yes Historical Provider, MD   ranitidine (ZANTAC) 150 MG capsule Take 150 mg by mouth 2 (two) times daily.   Yes Historical Provider, MD   rosuvastatin (CRESTOR) 10 MG tablet TAKE ONE TABLET BY MOUTH EVERY EVENING 1/29/18  Yes Nader Gil MD   sertraline (ZOLOFT) 100 MG tablet TAKE 1.5 TABLETS BY MOUTH ONCE DAILY 4/5/18  Yes Jackelin Layton NP   ZINC ACETATE ORAL Take 250 mg by mouth once daily.    Yes Historical Provider, MD   ALPRAZolam (XANAX) 0.25 MG tablet Take 1 tablet (0.25 mg total) by mouth 2 (two) times daily as needed for Anxiety. 12/18/17 3/15/18  Marti Coronel MD   inhalation spacing device (RITEFLO AEROCHAMBER) Use as directed for inhalation. 4/27/17   Marti Coronel MD     Anticoagulants/Antiplatelets: Plavix but off 5 days    Allergies:   Review of patient's allergies indicates:   Allergen Reactions    Corticosteroids (glucocorticoids) Nausea Only and Other (See Comments)     Stomach pain, dizziness, headache    Oxycodone Other (See Comments)     Blood pressure dropped     Sedation History:  have not been any systemic reactions        OBJECTIVE:     Vital Signs (Most Recent)  Temp: 97.7 °F (36.5 °C) (04/12/18 1152)  Pulse: 76 (04/12/18 1152)  Resp: 20 (04/12/18 1152)  BP: (!) 141/82 (04/12/18 1152)  SpO2: 98 % (04/12/18 1152)    Physical Exam:  ASA: 2  Mallampati: 2    General: no acute distress  Mental Status: alert and oriented to person, place and time  HEENT: normocephalic, atraumatic  Chest: unlabored breathing  Heart: regular heart rate  Abdomen: nondistended  Extremity: moves all extremities    Laboratory  Lab Results   Component Value Date    INR 1.0 09/01/2017       Lab Results   Component Value Date    WBC 8.19 04/05/2018    HGB 10.4 (L) 04/05/2018    HCT 33.1 (L) 04/05/2018     (H) 04/05/2018     04/05/2018      Lab Results   Component Value Date    GLU 80 04/05/2018    GLU 80 04/05/2018    NA  142 04/05/2018     04/05/2018    K 5.4 (H) 04/05/2018    K 5.4 (H) 04/05/2018     (H) 04/05/2018     (H) 04/05/2018    CO2 21 (L) 04/05/2018    CO2 21 (L) 04/05/2018    BUN 25 (H) 04/05/2018    BUN 25 (H) 04/05/2018    CREATININE 1.1 04/05/2018    CREATININE 1.1 04/05/2018    CALCIUM 9.1 04/05/2018    CALCIUM 9.1 04/05/2018    MG 1.8 04/09/2017    ALT 66 (H) 04/05/2018    ALT 66 (H) 04/05/2018    AST 68 (H) 04/05/2018    AST 68 (H) 04/05/2018    ALBUMIN 3.4 (L) 04/05/2018    ALBUMIN 3.4 (L) 04/05/2018    BILITOT 0.3 04/05/2018    BILITOT 0.3 04/05/2018    BILIDIR 0.1 12/13/2016       ASSESSMENT/PLAN:     Sedation Plan: moderate  Patient will undergo port explant.    Nima Abdullahi MD  Staff Radiologist  Department of Radiology  Pager: [phone]

## 2018-04-12 NOTE — PROCEDURES
Radiology Post-Procedure Note    Pre Op Diagnosis: large cell lymphoma/port dysfunction  Post Op Diagnosis: Same    Procedure: port explant    Procedure performed by: Dr Nima Abdullahi    Written Informed Consent Obtained: Yes  Specimen Removed: NO  Estimated Blood Loss: Minimal    Findings:   No complications    Patient tolerated procedure well.    Nima Abdullahi MD  Staff Radiologist  Department of Radiology  Pager: [phone]

## 2018-04-12 NOTE — DISCHARGE INSTRUCTIONS
ACTIVITY/SAFETY    If you received sedation today, please follow the recommendations below:  ·   Rest until you are more awake.  Gradually resume your normal activities over the next 24 hours.   · For the next 8 hours, you should be watched by a responsible adult. This person should make sure your condition is not getting worse.   · Don't drink any alcohol for the next 24 hours.  · Don't drive, operate dangerous machinery, or make important business or personal decisions during the next 24 hours.  · Your healthcare provider may tell you not to take any medicine by mouth for pain or sleep in the next 4 hours. These medicines may react with the medicines you were given in the hospital. This could cause a much stronger response than usual.      WOUND CARE  It is not unusual to have a small amount of bruising appear in the fistula area.  It is also common to have a tender knot beneath the skin at the puncture site.  This is scar tissue only and is not a cause for concern or alarm.  This tender knot may take several weeks to fully resolve.  If the knot gets larger and/or becomes swollen and painful, call your doctor immediately.  Remove the dressing from the puncture site tomorrow and apply a Band-Aid.    FOR GENERAL DISCOMFORT AT THE PUNCTURE SITE, TAKE TYLENOL EVERY 4 HOURS AS NEEDED.  DO NOT EXCEED 4000 MG A DAY.    SIGNS AND SYMPTOMS TO REPORT TO YOUR PHYSICIAN:    1. FEVER 101 DEGREES OR HIGHER  Redness or purulent drainage from site  2. PROBLEMS WITH THE FISTULA:     Pain                                                                  Discoloration                                                                  Swelling                                                                  Bleeding      DIET  At home, begin with liquids and then progress to normal foods if you don't experience nausea.             CALL YOUR HEALTHCARE PROVIDER IF YOU START TO HAVE THE FOLLOWING SYMPTOMS:        1.  Drowsiness that  doesn't get better       2. Weakness or dizziness that doesn't get better            3. Repeated vomiting      ACTIVITY/SAFETY    If you received sedation today, please follow the recommendations below:  ·   Rest until you are more awake.  Gradually resume your normal activities over the next 24 hours.   · For the next 8 hours, you should be watched by a responsible adult. This person should make sure your condition is not getting worse.   · Don't drink any alcohol for the next 24 hours.  · Don't drive, operate dangerous machinery, or make important business or personal decisions during the next 24 hours.  · Your healthcare provider may tell you not to take any medicine by mouth for pain or sleep in the next 4 hours. These medicines may react with the medicines you were given in the hospital. This could cause a much stronger response than usual.      WOUND CARE  It is not unusual to have a small amount of bruising appear in the fistula area.  It is also common to have a tender knot beneath the skin at the puncture site.  This is scar tissue only and is not a cause for concern or alarm.  This tender knot may take several weeks to fully resolve.  If the knot gets larger and/or becomes swollen and painful, call your doctor immediately.  Remove the dressing from the puncture site tomorrow and apply a Band-Aid.    FOR GENERAL DISCOMFORT AT THE PUNCTURE SITE, TAKE TYLENOL EVERY 4 HOURS AS NEEDED.  DO NOT EXCEED 4000 MG A DAY.    SIGNS AND SYMPTOMS TO REPORT TO YOUR PHYSICIAN:    1. FEVER 101 DEGREES OR HIGHER  Redness or purulent drainage from site  2. PROBLEMS WITH THE FISTULA:     Pain                                                                  Discoloration                                                                  Swelling                                                                  Bleeding      DIET  At home, begin with liquids and then progress to normal foods if you don't experience nausea.              CALL YOUR HEALTHCARE PROVIDER IF YOU START TO HAVE THE FOLLOWING SYMPTOMS:        1.  Drowsiness that doesn't get better       2. Weakness or dizziness that doesn't get better            3. Repeated vomiting      ACTIVITY/SAFETY    If you received sedation today, please follow the recommendations below:  ·   Rest until you are more awake.  Gradually resume your normal activities over the next 24 hours.   · For the next 8 hours, you should be watched by a responsible adult. This person should make sure your condition is not getting worse.   · Don't drink any alcohol for the next 24 hours.  · Don't drive, operate dangerous machinery, or make important business or personal decisions during the next 24 hours.  · Your healthcare provider may tell you not to take any medicine by mouth for pain or sleep in the next 4 hours. These medicines may react with the medicines you were given in the hospital. This could cause a much stronger response than usual.      WOUND CARE  .  Remove the dressing from the puncture site tomorrow and apply a Band-Aid.    FOR GENERAL DISCOMFORT AT THE PUNCTURE SITE, TAKE TYLENOL EVERY 4 HOURS AS NEEDED.  DO NOT EXCEED 4000 MG A DAY.    SIGNS AND SYMPTOMS TO REPORT TO YOUR PHYSICIAN:    1. FEVER 101 DEGREES OR HIGHER  Redness or purulent drainage from site  2. PROBLEMS WITH THE FISTULA:     Pain                                                                  Discoloration                                                                  Swelling                                                                  Bleeding      DIET  At home, begin with liquids and then progress to normal foods if you don't experience nausea.             CALL YOUR HEALTHCARE PROVIDER IF YOU START TO HAVE THE FOLLOWING SYMPTOMS:        1.  Drowsiness that doesn't get better       2. Weakness or dizziness that doesn't get better            3. Repeated vomiting

## 2018-04-18 ENCOUNTER — LAB VISIT (OUTPATIENT)
Dept: LAB | Facility: HOSPITAL | Age: 78
End: 2018-04-18
Attending: INTERNAL MEDICINE
Payer: MEDICARE

## 2018-04-18 DIAGNOSIS — N18.31 CHRONIC KIDNEY DISEASE (CKD) STAGE G3A/A1, MODERATELY DECREASED GLOMERULAR FILTRATION RATE (GFR) BETWEEN 45-59 ML/MIN/1.73 SQUARE METER AND ALBUMINURIA CREATININE RATIO LESS THAN 30 MG/G: ICD-10-CM

## 2018-04-18 LAB
25(OH)D3+25(OH)D2 SERPL-MCNC: 26 NG/ML
ALBUMIN SERPL BCP-MCNC: 3.2 G/DL
ANION GAP SERPL CALC-SCNC: 3 MMOL/L
BASOPHILS # BLD AUTO: 0.03 K/UL
BASOPHILS NFR BLD: 0.4 %
BUN SERPL-MCNC: 22 MG/DL
CALCIUM SERPL-MCNC: 8.8 MG/DL
CHLORIDE SERPL-SCNC: 116 MMOL/L
CO2 SERPL-SCNC: 21 MMOL/L
CREAT SERPL-MCNC: 1.1 MG/DL
DIFFERENTIAL METHOD: ABNORMAL
EOSINOPHIL # BLD AUTO: 0.2 K/UL
EOSINOPHIL NFR BLD: 2.1 %
ERYTHROCYTE [DISTWIDTH] IN BLOOD BY AUTOMATED COUNT: 16.7 %
EST. GFR  (AFRICAN AMERICAN): 56 ML/MIN/1.73 M^2
EST. GFR  (NON AFRICAN AMERICAN): 48.5 ML/MIN/1.73 M^2
GLUCOSE SERPL-MCNC: 60 MG/DL
HCT VFR BLD AUTO: 31.6 %
HGB BLD-MCNC: 9.5 G/DL
IMM GRANULOCYTES # BLD AUTO: 0.04 K/UL
IMM GRANULOCYTES NFR BLD AUTO: 0.5 %
LYMPHOCYTES # BLD AUTO: 3.2 K/UL
LYMPHOCYTES NFR BLD: 40.9 %
MCH RBC QN AUTO: 32.5 PG
MCHC RBC AUTO-ENTMCNC: 30.1 G/DL
MCV RBC AUTO: 108 FL
MONOCYTES # BLD AUTO: 0.8 K/UL
MONOCYTES NFR BLD: 9.9 %
NEUTROPHILS # BLD AUTO: 3.6 K/UL
NEUTROPHILS NFR BLD: 46.2 %
NRBC BLD-RTO: 0 /100 WBC
PHOSPHATE SERPL-MCNC: 3.9 MG/DL
PLATELET # BLD AUTO: 216 K/UL
PMV BLD AUTO: 10.8 FL
POTASSIUM SERPL-SCNC: 4.9 MMOL/L
RBC # BLD AUTO: 2.92 M/UL
SODIUM SERPL-SCNC: 140 MMOL/L
WBC # BLD AUTO: 7.78 K/UL

## 2018-04-18 PROCEDURE — 80069 RENAL FUNCTION PANEL: CPT

## 2018-04-18 PROCEDURE — 82306 VITAMIN D 25 HYDROXY: CPT

## 2018-04-18 PROCEDURE — 85025 COMPLETE CBC W/AUTO DIFF WBC: CPT

## 2018-04-18 PROCEDURE — 36415 COLL VENOUS BLD VENIPUNCTURE: CPT

## 2018-04-18 PROCEDURE — 82610 CYSTATIN C: CPT

## 2018-04-19 ENCOUNTER — TELEPHONE (OUTPATIENT)
Dept: NEPHROLOGY | Facility: CLINIC | Age: 78
End: 2018-04-19

## 2018-04-19 LAB
CYSTATIN C SERPL-MCNC: 1.72 MG/L
GFR/BSA.PRED SERPLBLD CYS-BASED-ARV: 33 ML/MIN/BSA

## 2018-04-19 NOTE — TELEPHONE ENCOUNTER
CALLED PT., RESCHEDULED APPT. WITH DR. IGNACIO ON 4/25, PT. VERBALIZED UNDERSTANDING AND CONFIRMED NEW APPT DETAILS.

## 2018-05-07 DIAGNOSIS — F51.01 PRIMARY INSOMNIA: ICD-10-CM

## 2018-05-07 DIAGNOSIS — G62.9 NEUROPATHY: ICD-10-CM

## 2018-05-07 DIAGNOSIS — C83.33 DIFFUSE LARGE B-CELL LYMPHOMA OF INTRA-ABDOMINAL LYMPH NODES: ICD-10-CM

## 2018-05-07 DIAGNOSIS — C85.83 LARGE CELL LYMPHOMA OF INTRA-ABDOMINAL LYMPH NODES: ICD-10-CM

## 2018-05-07 RX ORDER — MIRTAZAPINE 15 MG/1
TABLET, ORALLY DISINTEGRATING ORAL
Qty: 30 TABLET | Refills: 2 | Status: SHIPPED | OUTPATIENT
Start: 2018-05-07 | End: 2018-07-27 | Stop reason: SDUPTHER

## 2018-05-07 RX ORDER — MIRTAZAPINE 15 MG/1
TABLET, ORALLY DISINTEGRATING ORAL
Qty: 30 TABLET | Status: CANCELLED | OUTPATIENT
Start: 2018-05-07

## 2018-05-10 RX ORDER — GABAPENTIN 100 MG/1
CAPSULE ORAL
Qty: 180 CAPSULE | Refills: 2 | Status: SHIPPED | OUTPATIENT
Start: 2018-05-10 | End: 2018-07-27 | Stop reason: SDUPTHER

## 2018-05-15 ENCOUNTER — LAB VISIT (OUTPATIENT)
Dept: LAB | Facility: HOSPITAL | Age: 78
End: 2018-05-15
Attending: INTERNAL MEDICINE
Payer: MEDICARE

## 2018-05-15 DIAGNOSIS — R79.89 ABNORMAL LFTS: ICD-10-CM

## 2018-05-15 DIAGNOSIS — E87.5 HYPERKALEMIA: Primary | ICD-10-CM

## 2018-05-15 LAB
ALBUMIN SERPL BCP-MCNC: 3.1 G/DL
ALP SERPL-CCNC: 88 U/L
ALT SERPL W/O P-5'-P-CCNC: 46 U/L
ANION GAP SERPL CALC-SCNC: 4 MMOL/L
AST SERPL-CCNC: 49 U/L
BILIRUB SERPL-MCNC: 0.3 MG/DL
BUN SERPL-MCNC: 31 MG/DL
CALCIUM SERPL-MCNC: 9 MG/DL
CHLORIDE SERPL-SCNC: 117 MMOL/L
CO2 SERPL-SCNC: 19 MMOL/L
CREAT SERPL-MCNC: 1.2 MG/DL
EST. GFR  (AFRICAN AMERICAN): 50.4 ML/MIN/1.73 M^2
EST. GFR  (NON AFRICAN AMERICAN): 43.7 ML/MIN/1.73 M^2
GLUCOSE SERPL-MCNC: 101 MG/DL
POTASSIUM SERPL-SCNC: 5.6 MMOL/L
PROT SERPL-MCNC: 6.1 G/DL
SODIUM SERPL-SCNC: 140 MMOL/L

## 2018-05-15 PROCEDURE — 36415 COLL VENOUS BLD VENIPUNCTURE: CPT

## 2018-05-15 PROCEDURE — 80053 COMPREHEN METABOLIC PANEL: CPT

## 2018-05-18 ENCOUNTER — LAB VISIT (OUTPATIENT)
Dept: LAB | Facility: HOSPITAL | Age: 78
End: 2018-05-18
Attending: INTERNAL MEDICINE
Payer: MEDICARE

## 2018-05-18 ENCOUNTER — OFFICE VISIT (OUTPATIENT)
Dept: GASTROENTEROLOGY | Facility: CLINIC | Age: 78
End: 2018-05-18
Payer: MEDICARE

## 2018-05-18 ENCOUNTER — TELEPHONE (OUTPATIENT)
Dept: GASTROENTEROLOGY | Facility: CLINIC | Age: 78
End: 2018-05-18

## 2018-05-18 VITALS
WEIGHT: 160.94 LBS | DIASTOLIC BLOOD PRESSURE: 80 MMHG | HEIGHT: 64 IN | BODY MASS INDEX: 27.48 KG/M2 | SYSTOLIC BLOOD PRESSURE: 122 MMHG | HEART RATE: 76 BPM

## 2018-05-18 DIAGNOSIS — E87.5 HYPERKALEMIA: ICD-10-CM

## 2018-05-18 DIAGNOSIS — R79.89 ABNORMAL LFTS: Primary | ICD-10-CM

## 2018-05-18 LAB — POTASSIUM SERPL-SCNC: 6 MMOL/L

## 2018-05-18 PROCEDURE — 3074F SYST BP LT 130 MM HG: CPT | Mod: CPTII,S$GLB,, | Performed by: INTERNAL MEDICINE

## 2018-05-18 PROCEDURE — 99213 OFFICE O/P EST LOW 20 MIN: CPT | Mod: S$GLB,,, | Performed by: INTERNAL MEDICINE

## 2018-05-18 PROCEDURE — 3079F DIAST BP 80-89 MM HG: CPT | Mod: CPTII,S$GLB,, | Performed by: INTERNAL MEDICINE

## 2018-05-18 PROCEDURE — 99999 PR PBB SHADOW E&M-EST. PATIENT-LVL III: CPT | Mod: PBBFAC,,, | Performed by: INTERNAL MEDICINE

## 2018-05-18 PROCEDURE — 36415 COLL VENOUS BLD VENIPUNCTURE: CPT | Mod: PO

## 2018-05-18 PROCEDURE — 84132 ASSAY OF SERUM POTASSIUM: CPT | Mod: PO

## 2018-05-18 NOTE — Clinical Note
Still with issues of hyperkalemia not sure if this is all med related but I advised she stop losartan for now until she hears from Dr. Gil.

## 2018-05-18 NOTE — PROGRESS NOTES
Subjective:     Violet Swenson is here for follow up of Abnormal LFT      HPI  Since Violet Swenson's last visit she denies any acute issues. Her potassium has been elevated. She denies a high potassium diet. She does report an increase in her losartan a few weeks ago.     No evidence of liver decompensation: no ascites, confusion or GI bleeding.      Review of Systems    Objective:     Physical Exam   Constitutional: She is oriented to person, place, and time. She appears well-developed and well-nourished. No distress.   HENT:   Head: Normocephalic and atraumatic.   Mouth/Throat: Oropharynx is clear and moist. No oropharyngeal exudate.   Eyes: Conjunctivae are normal. Pupils are equal, round, and reactive to light. Right eye exhibits no discharge. Left eye exhibits no discharge. No scleral icterus.   Pulmonary/Chest: Effort normal and breath sounds normal. No respiratory distress. She has no wheezes.   Abdominal: Soft. She exhibits no distension. There is no tenderness.   Musculoskeletal: She exhibits no edema.   Neurological: She is alert and oriented to person, place, and time.   Psychiatric: She has a normal mood and affect. Her behavior is normal.   Vitals reviewed.      Computed MELD-Na score unavailable. Necessary lab results were not found in the last year.  Computed MELD score unavailable. Necessary lab results were not found in the last year.    WBC   Date Value Ref Range Status   04/18/2018 7.78 3.90 - 12.70 K/uL Final     Hemoglobin   Date Value Ref Range Status   04/18/2018 9.5 (L) 12.0 - 16.0 g/dL Final     POC Hematocrit   Date Value Ref Range Status   04/07/2017 35 (L) 36 - 54 %PCV Final     Hematocrit   Date Value Ref Range Status   04/18/2018 31.6 (L) 37.0 - 48.5 % Final     Platelets   Date Value Ref Range Status   04/18/2018 216 150 - 350 K/uL Final     BUN, Bld   Date Value Ref Range Status   05/15/2018 31 (H) 8 - 23 mg/dL Final     Creatinine   Date Value Ref Range  Status   05/15/2018 1.2 0.5 - 1.4 mg/dL Final     Glucose   Date Value Ref Range Status   05/15/2018 101 70 - 110 mg/dL Final     Calcium   Date Value Ref Range Status   05/15/2018 9.0 8.7 - 10.5 mg/dL Final     Sodium   Date Value Ref Range Status   05/15/2018 140 136 - 145 mmol/L Final     Potassium   Date Value Ref Range Status   05/18/2018 6.0 (H) 3.5 - 5.1 mmol/L Final     Chloride   Date Value Ref Range Status   05/15/2018 117 (H) 95 - 110 mmol/L Final     Magnesium   Date Value Ref Range Status   04/09/2017 1.8 1.6 - 2.6 mg/dL Final     AST   Date Value Ref Range Status   05/15/2018 49 (H) 10 - 40 U/L Final     ALT   Date Value Ref Range Status   05/15/2018 46 (H) 10 - 44 U/L Final     Alkaline Phosphatase   Date Value Ref Range Status   05/15/2018 88 55 - 135 U/L Final     Total Bilirubin   Date Value Ref Range Status   05/15/2018 0.3 0.1 - 1.0 mg/dL Final     Comment:     For infants and newborns, interpretation of results should be based  on gestational age, weight and in agreement with clinical  observations.  Premature Infant recommended reference ranges:  Up to 24 hours.............<8.0 mg/dL  Up to 48 hours............<12.0 mg/dL  3-5 days..................<15.0 mg/dL  6-29 days.................<15.0 mg/dL       Albumin   Date Value Ref Range Status   05/15/2018 3.1 (L) 3.5 - 5.2 g/dL Final     INR   Date Value Ref Range Status   09/01/2017 1.0 0.8 - 1.2 Final     Comment:     Coumadin Therapy:  2.0 - 3.0 for INR for all indicators except mechanical heart valves  and antiphospholipid syndromes which should use 2.5 - 3.5.           Assessment/Plan:     1. Abnormal LFTs    2. Hyperkalemia      Violet Swenson is a 78 y.o. female withAbnormal LFT    Abnormal LFTs- still low concern for significant underlying liver disease, LFTs improving. Will continue to monitor  -     Comprehensive metabolic panel; Future; Expected date: 05/18/2018    Hyperkalemia-lab repeated and still high; uncertain if this  is all med related  -Advised she stop losartan and check BP at home; will inform cards and she reports needing f/u soon  -Will treat with kayexalate and repeat labs  -Also look for evidence of cell lysis with LDH  -     Comprehensive metabolic panel; Future; Expected date: 05/18/2018  -     sodium polystyrene (KAYEXALATE) 15 gram/60 mL Susp; Take 120 mLs (30 g total) by mouth once.  Dispense: 120 mL; Refill: 2  -     Basic metabolic panel; Future; Expected date: 05/18/2018  -     Lactate dehydrogenase; Future; Expected date: 05/18/2018    RTC in 6 months with preclinic labs      Dahiana Morales MD

## 2018-05-19 ENCOUNTER — LAB VISIT (OUTPATIENT)
Dept: LAB | Facility: HOSPITAL | Age: 78
End: 2018-05-19
Attending: INTERNAL MEDICINE
Payer: MEDICARE

## 2018-05-19 DIAGNOSIS — E87.5 HYPERKALEMIA: ICD-10-CM

## 2018-05-19 LAB
ANION GAP SERPL CALC-SCNC: 9 MMOL/L
BUN SERPL-MCNC: 22 MG/DL
CALCIUM SERPL-MCNC: 9.2 MG/DL
CHLORIDE SERPL-SCNC: 114 MMOL/L
CO2 SERPL-SCNC: 17 MMOL/L
CREAT SERPL-MCNC: 1.2 MG/DL
EST. GFR  (AFRICAN AMERICAN): 50 ML/MIN/1.73 M^2
EST. GFR  (NON AFRICAN AMERICAN): 44 ML/MIN/1.73 M^2
GLUCOSE SERPL-MCNC: 90 MG/DL
LDH SERPL L TO P-CCNC: 161 U/L
POTASSIUM SERPL-SCNC: 5.6 MMOL/L
SODIUM SERPL-SCNC: 140 MMOL/L

## 2018-05-19 PROCEDURE — 80048 BASIC METABOLIC PNL TOTAL CA: CPT | Mod: PO

## 2018-05-19 PROCEDURE — 83615 LACTATE (LD) (LDH) ENZYME: CPT | Mod: PO

## 2018-05-19 PROCEDURE — 36415 COLL VENOUS BLD VENIPUNCTURE: CPT | Mod: PO

## 2018-05-21 ENCOUNTER — OFFICE VISIT (OUTPATIENT)
Dept: NEPHROLOGY | Facility: CLINIC | Age: 78
End: 2018-05-21
Payer: MEDICARE

## 2018-05-21 VITALS
OXYGEN SATURATION: 95 % | BODY MASS INDEX: 27.43 KG/M2 | SYSTOLIC BLOOD PRESSURE: 120 MMHG | HEIGHT: 64 IN | WEIGHT: 160.69 LBS | HEART RATE: 87 BPM | DIASTOLIC BLOOD PRESSURE: 80 MMHG | TEMPERATURE: 96 F | RESPIRATION RATE: 18 BRPM

## 2018-05-21 DIAGNOSIS — E87.5 HYPERKALEMIA: ICD-10-CM

## 2018-05-21 DIAGNOSIS — N17.9 AKI (ACUTE KIDNEY INJURY): ICD-10-CM

## 2018-05-21 DIAGNOSIS — N27.0 SMALL KIDNEY, UNILATERAL: ICD-10-CM

## 2018-05-21 DIAGNOSIS — Q63.1 LOBULATED KIDNEY: ICD-10-CM

## 2018-05-21 DIAGNOSIS — N14.0 ANALGESIC NEPHROPATHY: ICD-10-CM

## 2018-05-21 DIAGNOSIS — E87.20 ACIDOSIS: ICD-10-CM

## 2018-05-21 DIAGNOSIS — N18.31 CHRONIC KIDNEY DISEASE (CKD) STAGE G3A/A1, MODERATELY DECREASED GLOMERULAR FILTRATION RATE (GFR) BETWEEN 45-59 ML/MIN/1.73 SQUARE METER AND ALBUMINURIA CREATININE RATIO LESS THAN 30 MG/G: Primary | ICD-10-CM

## 2018-05-21 PROCEDURE — 99214 OFFICE O/P EST MOD 30 MIN: CPT | Mod: S$GLB,,, | Performed by: INTERNAL MEDICINE

## 2018-05-21 PROCEDURE — 3074F SYST BP LT 130 MM HG: CPT | Mod: CPTII,S$GLB,, | Performed by: INTERNAL MEDICINE

## 2018-05-21 PROCEDURE — 99999 PR PBB SHADOW E&M-EST. PATIENT-LVL III: CPT | Mod: PBBFAC,,, | Performed by: INTERNAL MEDICINE

## 2018-05-21 PROCEDURE — 3079F DIAST BP 80-89 MM HG: CPT | Mod: CPTII,S$GLB,, | Performed by: INTERNAL MEDICINE

## 2018-05-21 RX ORDER — SODIUM BICARBONATE 650 MG/1
650 TABLET ORAL 2 TIMES DAILY
Qty: 60 TABLET | Refills: 11 | Status: SHIPPED | OUTPATIENT
Start: 2018-05-21 | End: 2019-03-27 | Stop reason: SDUPTHER

## 2018-05-21 RX ORDER — ERGOCALCIFEROL 1.25 MG/1
50000 CAPSULE ORAL
Qty: 12 CAPSULE | Refills: 5 | Status: SHIPPED | OUTPATIENT
Start: 2018-05-21 | End: 2018-08-19

## 2018-05-21 NOTE — PROGRESS NOTES
Subjective:       Patient ID: Violet Swenson is a 78 y.o. White female who presents for new evaluation of Follow-up; Acute Renal Failure; Chronic Kidney Disease; Acidosis; Hyperkalemia; and Vitamin D Deficiency    HPI     Patient is a 78-year-old female with history of hypertension and lymphoma.  Patient had chemotherapy 2017 resulting in congestive heart failure and cardiomyopathy.  Patient has had rise in LFTs.patient had been taking Motrin which 15 mg but was seen by Dr. PANIAGUA in rheumatology who reduced the Mobic down to 7.5 mg.  Patient has been seen by Dr. Morales in hepatology for increased LFTs.    February 2018 patient evaluated for rising creatinine.  Workup ordered.  Noted left sided atrophic kidney and lobulated kidney on the right side which is enlarged based on the CT scan from 2017      Review of Systems   Constitutional: Negative for activity change, appetite change, chills, diaphoresis, fatigue, fever and unexpected weight change.   HENT: Negative for congestion, dental problem, drooling, postnasal drip, rhinorrhea and voice change.    Eyes: Negative for discharge.   Respiratory: Negative for apnea, cough, choking, chest tightness, shortness of breath, wheezing and stridor.    Cardiovascular: Negative for chest pain, palpitations and leg swelling.   Gastrointestinal: Negative for abdominal distention, blood in stool, constipation, diarrhea, nausea, rectal pain and vomiting.   Endocrine: Negative for cold intolerance, heat intolerance, polydipsia and polyuria.   Genitourinary: Negative for decreased urine volume, difficulty urinating, dysuria, enuresis, flank pain, frequency, hematuria and urgency.   Musculoskeletal: Positive for arthralgias, joint swelling and myalgias. Negative for back pain and gait problem.   Skin: Negative for rash.   Allergic/Immunologic: Negative for food allergies and immunocompromised state.   Neurological: Negative for dizziness, tremors, syncope, numbness and  "headaches.   Hematological: Does not bruise/bleed easily.   Psychiatric/Behavioral: Negative for agitation, behavioral problems and self-injury. The patient is not nervous/anxious and is not hyperactive.    All other systems reviewed and are negative.      Objective:   /80   Pulse 87   Temp 96.3 °F (35.7 °C) (Oral)   Resp 18   Ht 5' 4" (1.626 m)   Wt 72.9 kg (160 lb 11.5 oz)   SpO2 95%   BMI 27.59 kg/m²      Physical Exam   Constitutional: She is oriented to person, place, and time. No distress.   HENT:   Head: Normocephalic and atraumatic.   Nose: Nose normal.   Eyes: Conjunctivae and EOM are normal. Pupils are equal, round, and reactive to light.   Neck: Normal range of motion. No JVD present. No tracheal deviation present. No thyromegaly present.   Cardiovascular: Normal rate, regular rhythm, normal heart sounds and intact distal pulses.  Exam reveals no gallop and no friction rub.    No murmur heard.  Pulmonary/Chest: Effort normal and breath sounds normal. No respiratory distress. She has no wheezes. She has no rales. She exhibits no tenderness.   Abdominal: Soft. Bowel sounds are normal. She exhibits no distension and no mass. There is no tenderness. No hernia.   Musculoskeletal: Normal range of motion. She exhibits no edema, tenderness or deformity.   Neurological: She is alert and oriented to person, place, and time. She has normal reflexes. She displays normal reflexes. No cranial nerve deficit. She exhibits normal muscle tone. Coordination normal.   Skin: Skin is warm. She is not diaphoretic. No erythema. There is pallor.   Psychiatric: She has a normal mood and affect. Her behavior is normal. Judgment and thought content normal.   Nursing note and vitals reviewed.        Lab Results   Component Value Date    CREATININE 1.2 05/19/2018    BUN 22 05/19/2018     05/19/2018    K 5.6 (H) 05/19/2018     (H) 05/19/2018    CO2 17 (L) 05/19/2018     Lab Results   Component Value Date    WBC " 7.78 04/18/2018    HGB 9.5 (L) 04/18/2018    HCT 31.6 (L) 04/18/2018     (H) 04/18/2018     04/18/2018     Lab Results   Component Value Date    CALCIUM 9.2 05/19/2018    PHOS 3.9 04/18/2018        Assessment:    )    1. Chronic kidney disease (CKD) stage G3a/A1, moderately decreased glomerular filtration rate (GFR) between 45-59 mL/min/1.73 square meter and albuminuria creatinine ratio less than 30 mg/g    2. Small kidney, unilateral    3. Lobulated kidney    4. Analgesic nephropathy    5. NATHALIE (acute kidney injury)    6. Acidosis    7. Hyperkalemia        Plan:        1. Acute kidney injury on chronic kidney disease stage III: NATHALIE is better ; GFR 33%     2.  Small size kidneys/atrophic kidney on the left side:no renal artery stenosis     3. Vit D def:  Check parathyroid hormone    4. CKD-Stage III: 33 % no proteinuria ; watch on mobic    5. Hyperkalemia: due to acidosis; cozaar; mobic and CKD stageIII: will sodium bicarbonate; low K + diet list provided     6. Start weekly Vit D         follow-up 2 months

## 2018-05-21 NOTE — PATIENT INSTRUCTIONS
1. Acute kidney injury on chronic kidney disease stage III: NATHALIE is better ; GFR 33%     2.  Small size kidneys/atrophic kidney on the left side:no renal artery stenosis     3. Vit D def:  Check parathyroid hormone    4. CKD-Stage III: 33 % no proteinuria ; watch on mobic    5. Hyperkalemia: due to acidosis; cozaar; mobic and CKD stageIII: will sodium bicarbonate; low K + diet list provided     6. Start weekly Vit D

## 2018-05-23 ENCOUNTER — TELEPHONE (OUTPATIENT)
Dept: CARDIOLOGY | Facility: CLINIC | Age: 78
End: 2018-05-23

## 2018-05-23 DIAGNOSIS — I10 HYPERTENSION, UNSPECIFIED TYPE: Primary | ICD-10-CM

## 2018-05-23 DIAGNOSIS — E87.5 HYPERKALEMIA: Primary | ICD-10-CM

## 2018-05-23 NOTE — TELEPHONE ENCOUNTER
----- Message from Nader Gil MD sent at 5/20/2018  2:00 PM CDT -----  Yes agree with stopping losartan   Vianey please get repeat bmp next week   ----- Message -----  From: Dahiana Morales MD  Sent: 5/18/2018   1:22 PM  To: Nader Gil MD    Still with issues of hyperkalemia not sure if this is all med related but I advised she stop losartan for now until she hears from Dr. Gil.

## 2018-05-30 ENCOUNTER — LAB VISIT (OUTPATIENT)
Dept: LAB | Facility: HOSPITAL | Age: 78
End: 2018-05-30
Attending: FAMILY MEDICINE
Payer: MEDICARE

## 2018-05-30 ENCOUNTER — TELEPHONE (OUTPATIENT)
Dept: GASTROENTEROLOGY | Facility: CLINIC | Age: 78
End: 2018-05-30

## 2018-05-30 DIAGNOSIS — E87.5 HYPERKALEMIA: ICD-10-CM

## 2018-05-30 DIAGNOSIS — I10 HYPERTENSION, UNSPECIFIED TYPE: ICD-10-CM

## 2018-05-30 LAB
ANION GAP SERPL CALC-SCNC: 6 MMOL/L
BUN SERPL-MCNC: 25 MG/DL
CALCIUM SERPL-MCNC: 9 MG/DL
CHLORIDE SERPL-SCNC: 113 MMOL/L
CO2 SERPL-SCNC: 23 MMOL/L
CREAT SERPL-MCNC: 1.1 MG/DL
EST. GFR  (AFRICAN AMERICAN): 56 ML/MIN/1.73 M^2
EST. GFR  (NON AFRICAN AMERICAN): 49 ML/MIN/1.73 M^2
GLUCOSE SERPL-MCNC: 87 MG/DL
POTASSIUM SERPL-SCNC: 4.4 MMOL/L
POTASSIUM SERPL-SCNC: 4.4 MMOL/L
SODIUM SERPL-SCNC: 142 MMOL/L

## 2018-05-30 PROCEDURE — 36415 COLL VENOUS BLD VENIPUNCTURE: CPT

## 2018-05-30 PROCEDURE — 80048 BASIC METABOLIC PNL TOTAL CA: CPT

## 2018-05-30 NOTE — TELEPHONE ENCOUNTER
Spoke with patient about potassium results. She would like to know if  wants her to start the Losartan or remain off. Will inform , she verbalized understanding, TE.

## 2018-06-04 ENCOUNTER — PATIENT OUTREACH (OUTPATIENT)
Dept: ADMINISTRATIVE | Facility: HOSPITAL | Age: 78
End: 2018-06-04

## 2018-06-18 ENCOUNTER — LAB VISIT (OUTPATIENT)
Dept: LAB | Facility: HOSPITAL | Age: 78
End: 2018-06-18
Attending: FAMILY MEDICINE
Payer: MEDICARE

## 2018-06-18 ENCOUNTER — OFFICE VISIT (OUTPATIENT)
Dept: INTERNAL MEDICINE | Facility: CLINIC | Age: 78
End: 2018-06-18
Payer: MEDICARE

## 2018-06-18 VITALS
DIASTOLIC BLOOD PRESSURE: 74 MMHG | SYSTOLIC BLOOD PRESSURE: 126 MMHG | BODY MASS INDEX: 27.55 KG/M2 | OXYGEN SATURATION: 98 % | HEART RATE: 85 BPM | HEIGHT: 64 IN | WEIGHT: 161.38 LBS | TEMPERATURE: 98 F

## 2018-06-18 DIAGNOSIS — L82.1 SEBORRHEIC KERATOSIS: Primary | ICD-10-CM

## 2018-06-18 DIAGNOSIS — I10 ESSENTIAL HYPERTENSION: Chronic | ICD-10-CM

## 2018-06-18 DIAGNOSIS — F32.9 MAJOR DEPRESSION, CHRONIC: Chronic | ICD-10-CM

## 2018-06-18 DIAGNOSIS — F41.8 SITUATIONAL ANXIETY: ICD-10-CM

## 2018-06-18 DIAGNOSIS — E03.9 HYPOTHYROIDISM (ACQUIRED): Chronic | ICD-10-CM

## 2018-06-18 LAB
T4 SERPL-MCNC: 4.8 UG/DL
TSH SERPL DL<=0.005 MIU/L-ACNC: 3.23 UIU/ML

## 2018-06-18 PROCEDURE — 99214 OFFICE O/P EST MOD 30 MIN: CPT | Mod: S$GLB,,, | Performed by: NURSE PRACTITIONER

## 2018-06-18 PROCEDURE — 84443 ASSAY THYROID STIM HORMONE: CPT

## 2018-06-18 PROCEDURE — 99999 PR PBB SHADOW E&M-EST. PATIENT-LVL V: CPT | Mod: PBBFAC,,, | Performed by: NURSE PRACTITIONER

## 2018-06-18 PROCEDURE — 3078F DIAST BP <80 MM HG: CPT | Mod: CPTII,S$GLB,, | Performed by: NURSE PRACTITIONER

## 2018-06-18 PROCEDURE — 84436 ASSAY OF TOTAL THYROXINE: CPT

## 2018-06-18 PROCEDURE — 3074F SYST BP LT 130 MM HG: CPT | Mod: CPTII,S$GLB,, | Performed by: NURSE PRACTITIONER

## 2018-06-18 PROCEDURE — 36415 COLL VENOUS BLD VENIPUNCTURE: CPT

## 2018-06-18 RX ORDER — ALPRAZOLAM 0.25 MG/1
TABLET ORAL
Qty: 60 TABLET | Refills: 0 | Status: SHIPPED | OUTPATIENT
Start: 2018-06-18 | End: 2018-07-05 | Stop reason: SDUPTHER

## 2018-06-18 RX ORDER — LEVOTHYROXINE SODIUM 75 UG/1
TABLET ORAL
Qty: 90 TABLET | Refills: 1 | Status: SHIPPED | OUTPATIENT
Start: 2018-06-18 | End: 2019-03-27 | Stop reason: SDUPTHER

## 2018-06-18 NOTE — PROGRESS NOTES
Violet Swenson  06/18/2018  [id number]    Marti Coronel MD  Patient Care Team:  Marti Coronel MD as PCP - General (Family Medicine)  Nader Gil MD as Consulting Physician (Cardiology)  Ross Corral MD as Consulting Provider (Pulmonary Disease)  Dilshad Rogers MD as Consulting Physician (Rheumatology)  Da Matson MD as Consulting Physician (Hematology and Oncology)  Has the patient seen any provider outside of the [facility] network since the last visit? (no). If yes, HIPPA forms completed and records requested.        Visit Type:a scheduled routine follow-up visit    Chief Complaint:  Chief Complaint   Patient presents with    6 month follow up       History of Present Illness:    Patient presents to clinic today for follow up of chronic conditions including HTN, hypothyroidism, and depression. She has several concerns today. She reports dryness in her ears and around her nose. She reports right-sided low back pain for the past month that waxes and wanes. She describes as sometimes burning or aching. She is on mobic. She also reports brown spots to her face.    History:  Past Medical History:   Diagnosis Date    Anemia     Anxiety     Arthritis     Cancer     lymphoma Large cell B    Coronary artery disease     01/2015 LHC patent LCX. 50% stenosis in LAD and RCA.      Depression     Disorder of kidney and ureter     Encounter for blood transfusion     GERD (gastroesophageal reflux disease)     Gout, arthritis     Heart failure     Hx of psychiatric care     Hyperlipidemia     Hypertension     Hypothyroidism     Immune deficiency disorder     Lung nodule 2014    RML--stable    Obesity     Pacemaker     Metronic    Paroxysmal atrial fibrillation 3/15/2018    Pneumonia     Polyneuropathy     chemo induced    Psychiatric problem     Tobacco dependence     quit 1976    Trouble in sleeping      Past Surgical History:   Procedure Laterality Date     "APPENDECTOMY  1966 approx    CARDIAC PACEMAKER PLACEMENT  2015    CHOLECYSTECTOMY  1993    incidental at time of gastric bypass    COLON SURGERY Right 2017    hemicolectomy    COLONOSCOPY N/A 2017    Procedure: COLONOSCOPY;  Surgeon: Tye Enamorado MD;  [facility];  Service: Endoscopy;  Laterality: N/A;    CORONARY ANGIOPLASTY  2014    CORONARY STENT PLACEMENT  2014    GASTRIC BYPASS  1993    with incidental choly    HERNIA REPAIR      JOINT REPLACEMENT Bilateral 2009    3 months apart    TONSILLECTOMY  1959     Family History   Problem Relation Age of Onset    Heart disease Mother     Hypertension Mother     Cataracts Mother     Stomach cancer Father         "ulcers that turned to cancer"    Cancer Father         stomach    Pancreatic cancer Sister     Cancer Sister         pancreatic    Leukemia Brother         "leukemia which led to intestinal cancer"    Cataracts Brother     Cancer Brother         leukemia then later stomach cancer    Drug abuse Son     Cancer Son         prostate cancer    Prostate cancer Son     COPD Daughter     Asthma Daughter     Hypertension Maternal Grandfather     Stroke Maternal Grandfather     Alcohol abuse Neg Hx     Diabetes Neg Hx     Mental retardation Neg Hx     Mental illness Neg Hx      Social History     Social History    Marital status:      Spouse name: N/A    Number of children: 4    Years of education: N/A     Occupational History    Not on file.     Social History Main Topics    Smoking status: Former Smoker     Packs/day: 2.00     Years: 6.00     Quit date: 3/15/1976    Smokeless tobacco: Never Used    Alcohol use No    Drug use: No    Sexual activity: No     Other Topics Concern    Not on file     Social History Narrative    , lives with . She is a retired . 4 children (3 natural born, 1 adopted)- 2 ; 2x  (currently 17 years); son has prostate cancer, " daughter COPD,  cardiac difficulty. She still drives. Does not have a Living will or Advanced Directive.     Patient Active Problem List   Diagnosis    Essential hypertension    Mixed hyperlipidemia    Hypothyroidism (acquired)    Major depression, chronic    Pacemaker    NSAID long-term use    Idiopathic chronic gout of multiple sites without tophus    Primary osteoarthritis involving multiple joints    Neuropathic pain of right foot    Coronary artery disease : multiple vessels, s/p PTCA    Stenosing tenosynovitis of finger of left hand    Myofascial pain    Abdominal pain    Ischemic cardiomyopathy    Diffuse large B cell lymphoma    Chronic anemia    S/P gastric bypass    Adjustment disorder with emotional disturbance    Diffusion capacity of lung (dl), decreased    Calcification of aorta    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    History of congestive heart failure    Situational anxiety    Insomnia secondary to anxiety    Seasonal allergic rhinitis    Abnormal LFTs (liver function tests)    Osteopenia    Unilateral osteoarthritis of first carpometacarpal (CMC) joint    Stage 3 chronic kidney disease    Chemotherapy-induced neuropathy    S/P TKR (total knee replacement), bilateral    Paroxysmal atrial fibrillation    Macrocytic anemia    Colorectal cancer     Review of patient's allergies indicates:   Allergen Reactions    Corticosteroids (glucocorticoids) Nausea Only and Other (See Comments)     Stomach pain, dizziness, headache    Oxycodone Other (See Comments)     Blood pressure dropped       The following were reviewed at this visit: active problem list, medication list, allergies, family history, social history, and health maintenance.    Medications:  Current Outpatient Prescriptions on File Prior to Visit   Medication Sig Dispense Refill    allopurinol (ZYLOPRIM) 300 MG tablet Take 1 tablet (300 mg total) by mouth once daily. 90 tablet 3     ascorbic acid, vitamin C, (VITAMIN C) 100 MG tablet Take by mouth once daily.      aspirin (ECOTRIN) 81 MG EC tablet Take 81 mg by mouth nightly.       clopidogrel (PLAVIX) 75 mg tablet TAKE ONE TABLET BY MOUTH EVERY DAY 90 tablet 3    COLCRYS 0.6 mg tablet Take 1 tablet (0.6 mg total) by mouth once daily. 30 tablet 11    diclofenac sodium 1 % Gel Apply 2 g topically once daily. Apply 2 g over painful joints once or twice a day. 100 g 6    ergocalciferol (ERGOCALCIFEROL) 50,000 unit Cap Take 1 capsule (50,000 Units total) by mouth every 7 days. 12 capsule 5    fluticasone (FLONASE) 50 mcg/actuation nasal spray 2 sprays by Each Nare route once daily. 16 g 11    furosemide (LASIX) 20 MG tablet Take 1 tablet (20 mg total) by mouth once daily. 30 tablet 11    gabapentin (NEURONTIN) 100 MG capsule TAKE TWO CAPSULES BY MOUTH THREE TIMES DAILY 180 capsule 2    inhalation spacing device ([employer].ruEFLO AEROCHAMBER) Use as directed for inhalation. 1 Device 0    isosorbide mononitrate (IMDUR) 30 MG 24 hr tablet Take 1 tablet (30 mg total) by mouth nightly. 30 tablet 11    loratadine (CLARITIN) 10 mg tablet Take 1 tablet (10 mg total) by mouth once daily.  0    losartan (COZAAR) 25 MG tablet Take 1 tablet (25 mg total) by mouth once daily. 30 tablet 11    meloxicam (MOBIC) 7.5 MG tablet TAKE ONE TABLET BY MOUTH EVERY DAY AS NEEDED FOR PAIN 90 tablet 3    metoprolol tartrate (LOPRESSOR) 25 MG tablet Take 1 tablet (25 mg total) by mouth 2 (two) times daily. 60 tablet 11    mirtazapine (REMERON SOL-TAB) 15 MG disintegrating tablet DISSOLVE ONE TABLET BY MOUTH NIGHTLY 30 tablet 2    multivitamin (ONE DAILY MULTIVITAMIN) per tablet Take 1 tablet by mouth once daily.      ranitidine (ZANTAC) 150 MG capsule Take 150 mg by mouth 2 (two) times daily.      rosuvastatin (CRESTOR) 10 MG tablet TAKE ONE TABLET BY MOUTH EVERY EVENING 30 tablet 6    sertraline (ZOLOFT) 100 MG tablet TAKE 1.5 TABLETS BY MOUTH ONCE DAILY 90 tablet  1    sodium bicarbonate 650 MG tablet Take 1 tablet (650 mg total) by mouth 2 (two) times daily. 60 tablet 11    ZINC ACETATE ORAL Take 250 mg by mouth once daily.       [DISCONTINUED] ALPRAZolam (XANAX) 0.25 MG tablet Take 1 tablet (0.25 mg total) by mouth 2 (two) times daily as needed for Anxiety. 60 tablet 5    [DISCONTINUED] levothyroxine (SYNTHROID) 75 MCG tablet TAKE 1 TABLET(75 MCG) BY MOUTH BEFORE BREAKFAST 90 tablet 1     No current facility-administered medications on file prior to visit.        Medications have been reviewed and reconciled with patient at this visit.  Barriers to medications present (no)    Adverse reactions to current medications (no)    Over the counter medications reviewed (Yes ), and if needed added to active Medication list at this visit.     Exam:  Wt Readings from Last 3 Encounters:   06/18/18 73.2 kg (161 lb 6 oz)   05/21/18 72.9 kg (160 lb 11.5 oz)   05/18/18 73 kg (160 lb 15 oz)     Temp Readings from Last 3 Encounters:   06/18/18 97.8 °F (36.6 °C) (Tympanic)   05/21/18 96.3 °F (35.7 °C) (Oral)   04/12/18 97.8 °F (36.6 °C) (Oral)     BP Readings from Last 3 Encounters:   06/18/18 126/74   05/21/18 120/80   05/18/18 122/80     Pulse Readings from Last 3 Encounters:   06/18/18 85   05/21/18 87   05/18/18 76     Body mass index is 27.7 kg/m².    Review of Systems   Constitutional: Negative for chills and fever.   HENT: Positive for congestion and ear discharge. Negative for sore throat.    Eyes: Negative for pain.   Respiratory: Negative for cough and shortness of breath.    Cardiovascular: Negative for palpitations.   Genitourinary: Negative for dysuria.   Musculoskeletal: Positive for back pain and joint pain. Negative for falls (uses walker).   Skin: Positive for rash.   Neurological: Negative for weakness.   Psychiatric/Behavioral: Positive for depression. The patient has insomnia.          Physical Exam   Constitutional: She is oriented to person, place, and time. She  appears well-developed and well-nourished. No distress.   HENT:   Head: Normocephalic and atraumatic.   Left Ear: Tympanic membrane and ear canal normal.   Nose: Rhinorrhea (clear) present. No mucosal edema.   Mouth/Throat: Uvula is midline, oropharynx is clear and moist and mucous membranes are normal.       Right TM partially obstructed by cerumen   Eyes: Conjunctivae and EOM are normal. Pupils are equal, round, and reactive to light.   Cardiovascular: Normal rate and regular rhythm.  Exam reveals no gallop and no friction rub.    No murmur heard.  Pulmonary/Chest: Effort normal and breath sounds normal.   Neurological: She is alert and oriented to person, place, and time.   Skin: Skin is warm and dry.        Psychiatric: She has a normal mood and affect.   Vitals reviewed.      Laboratory Reviewed ({Yes)  Lab Results   Component Value Date    WBC 7.78 04/18/2018    HGB 9.5 (L) 04/18/2018    HCT 31.6 (L) 04/18/2018     04/18/2018    CHOL 138 04/05/2017    TRIG 86 04/05/2017    HDL 58 04/05/2017    ALT 46 (H) 05/15/2018    AST 49 (H) 05/15/2018     05/30/2018    K 4.4 05/30/2018    K 4.4 05/30/2018     (H) 05/30/2018    CREATININE 1.1 05/30/2018    BUN 25 (H) 05/30/2018    CO2 23 05/30/2018    TSH 2.936 12/18/2017    INR 1.0 09/01/2017       Assessment:  The primary encounter diagnosis was Seborrheic keratosis. Diagnoses of Hypothyroidism (acquired), Major depression, chronic, and Essential hypertension were also pertinent to this visit.     Plan  Hypothyroidism (acquired)  Status pending labs, continue current synthroid    Major depression, chronic  Stable, continue zoloft and xanax    Essential hypertension  Controlled, continue current medications    Debrox for cerumen. Follow up if no improvement.  Cool mist humidifier for nasal dryness  HRA reviewed with patient. Followed by cardiology, GI, heme/onc.  Thyroid labs today.  Care Plan/Goals: Reviewed  (N/A)  Goals     None          Follow up:  Follow-up in about 6 months (around 12/18/2018), or if symptoms worsen or fail to improve, for annual wellness/EPP with Dr. Coronel.    After visit summary was printed and given to patient upon discharge today.  Patient goals and care plan are included in After Visit Summary.

## 2018-06-21 ENCOUNTER — INITIAL CONSULT (OUTPATIENT)
Dept: DERMATOLOGY | Facility: CLINIC | Age: 78
End: 2018-06-21
Payer: MEDICARE

## 2018-06-21 DIAGNOSIS — D48.9 NEOPLASM OF UNCERTAIN BEHAVIOR: Primary | ICD-10-CM

## 2018-06-21 DIAGNOSIS — L82.1 SEBORRHEIC KERATOSIS: ICD-10-CM

## 2018-06-21 PROCEDURE — 88305 TISSUE EXAM BY PATHOLOGIST: CPT | Performed by: PATHOLOGY

## 2018-06-21 PROCEDURE — 99202 OFFICE O/P NEW SF 15 MIN: CPT | Mod: 25,S$GLB,, | Performed by: PHYSICIAN ASSISTANT

## 2018-06-21 PROCEDURE — 99999 PR PBB SHADOW E&M-EST. PATIENT-LVL III: CPT | Mod: PBBFAC,,, | Performed by: PHYSICIAN ASSISTANT

## 2018-06-21 PROCEDURE — 11100 PR BIOPSY OF SKIN LESION: CPT | Mod: 59,S$GLB,, | Performed by: PHYSICIAN ASSISTANT

## 2018-06-21 PROCEDURE — 88305 TISSUE EXAM BY PATHOLOGIST: CPT | Mod: 26,,, | Performed by: PATHOLOGY

## 2018-06-21 PROCEDURE — 17110 DESTRUCTION B9 LES UP TO 14: CPT | Mod: S$GLB,,, | Performed by: PHYSICIAN ASSISTANT

## 2018-06-21 NOTE — PROGRESS NOTES
Subjective:       Patient ID:  Violet Swenson is a 78 y.o. female who presents for No chief complaint on file.    History of Present Illness: The patient presents with chief complaint of a lesion.  Location: Right forehead  Duration: several years  Signs/Symptoms: growing in size and changing color    Prior treatments: none  Pertinent history: +FHX skin cancer, denies personal history of skin cancer, +history of breast cancer s/p chemo. She believes the forehead lesion has grown since undergoing chemotherapy.          Review of Systems   Constitutional: Negative for fever and chills.   Gastrointestinal: Negative for nausea and vomiting.   Skin: Positive for daily sunscreen use. Negative for rash and recent sunburn.        Objective:    Physical Exam   Constitutional: She appears well-developed and well-nourished. No distress.   Neurological: She is alert and oriented to person, place, and time. She is not disoriented.   Psychiatric: She has a normal mood and affect.   Skin:   Areas Examined (abnormalities noted in diagram):   Head / Face Inspection Performed  Neck Inspection Performed  Chest / Axilla Inspection Performed  Back Inspection Performed  RUE Inspected  LUE Inspection Performed              Diagram Legend     Erythematous scaling macule/papule c/w actinic keratosis       Vascular papule c/w angioma      Pigmented verrucoid papule/plaque c/w seborrheic keratosis      Yellow umbilicated papule c/w sebaceous hyperplasia      Irregularly shaped tan macule c/w lentigo     1-2 mm smooth white papules consistent with Milia      Movable subcutaneous cyst with punctum c/w epidermal inclusion cyst      Subcutaneous movable cyst c/w pilar cyst      Firm pink to brown papule c/w dermatofibroma      Pedunculated fleshy papule(s) c/w skin tag(s)      Evenly pigmented macule c/w junctional nevus     Mildly variegated pigmented, slightly irregular-bordered macule c/w mildly atypical nevus      Flesh colored  to evenly pigmented papule c/w intradermal nevus       Pink pearly papule/plaque c/w basal cell carcinoma      Erythematous hyperkeratotic cursted plaque c/w SCC      Surgical scar with no sign of skin cancer recurrence      Open and closed comedones      Inflammatory papules and pustules      Verrucoid papule consistent consistent with wart     Erythematous eczematous patches and plaques     Dystrophic onycholytic nail with subungual debris c/w onychomycosis     Umbilicated papule    Erythematous-base heme-crusted tan verrucoid plaque consistent with inflamed seborrheic keratosis     Erythematous Silvery Scaling Plaque c/w Psoriasis     See annotation      Assessment / Plan:      Pathology Orders:     Normal Orders This Visit    Tissue Specimen To Pathology, Dermatology     Questions:    Directional Terms:  Other(comment)    Clinical information:  SK vs. r/o melanoma vs. other    Specific Site:  Right inferior forehead        Neoplasm of uncertain behavior  -     Tissue Specimen To Pathology, Dermatology  Will call with results. Recommend FSE with   (9/27/18 apt given)    Seborrheic keratosis  Reassurance given.  Lesions are benign.  After risks, benefits, and alternatives discussed, including blistering, pain, and scar, verbal consent from the patient was obtained.  Liquid nitrogen cryotherapy is applied to 1 lesion on right superior forehead to produce a freeze injury. 2 consecutive freeze thaw cycles. The patient is aware that blisters (possibly blood blisters) may form. Discussed wound care.          No Follow-up on file.

## 2018-06-21 NOTE — LETTER
June 21, 2018      [place]  [address] [place] [address]           [facility]  [address] [place] [address]  Phone: [phone]  Fax: [phone]          Patient: Violet Swenson   MR Number: [id number]   YOB: 1940   Date of Visit: 6/21/2018       Dear Jackelin Layton:    Thank you for referring Violet Swenson to me for evaluation. Attached you will find relevant portions of my assessment and plan of care.    If you have questions, please do not hesitate to call me. I look forward to following Violet Swenson along with you.    Sincerely,    Nicole Melgar PA-C    Enclosure  CC:  No Recipients    If you would like to receive this communication electronically, please contact [email or web address] or [phone] to request more information on [employer] Link access.    For providers and/or their staff who would like to refer a patient to [facility], please contact us through our one-stop-shop provider referral line, [facility], at [phone].    If you feel you have received this communication in error or would no longer like to receive these types of communications, please e-mail [email or web address]

## 2018-06-21 NOTE — PATIENT INSTRUCTIONS
CRYOSURGERY      Your PA has used a method called cryosurgery to treat your skin condition. Cryosurgery refers to the use of very cold substances to treat a variety of skin conditions such as warts, pre-skin cancers, molluscum contagiosum, sun spots, and several benign growths. The substance we use in cryosurgery is liquid nitrogen and is so cold (-195 degrees Celsius) that is burns when administered.     Following treatment in the office, the skin may immediately burn and become red. You may find the area around the lesion is affected as well. It is sometimes necessary to treat not only the lesion, but a small area of the surrounding normal skin to achieve a good response.     A blister, and even a blood filled blister, may form after treatment.   This is a normal response. If the blister is painful, it is acceptable to sterilize a needle and with rubbing alcohol and gently pop the blister. It is important that you gently wash the area with soap and warm water as the blister fluid may contain wart virus if a wart was treated. Do no remove the roof of the blister.     The area treated can take anywhere from 1-3 weeks to heal. Healing time depends on the kind of skin lesion treated, the location, and how aggressively the lesion was treated. It is recommended that the areas treated are covered with Vaseline or bacitracin ointment and a band-aid. If a band-aid is not practical, just ointment applied several times per day will do. Keeping these areas moist will speed the healing time.    Treatment with liquid nitrogen can leave a scar. In dark skin, it may be a light or dark scar, in light skin it may be a white or pink scar. These will generally fade with time.    If you have any concerns after your treatment, please feel free to call the office.         [facility]  [address]  [place] [address]  Dept: [phone]  Dept Fax: [phone]   Shave Biopsy Wound Care    Your PA  has performed a shave biopsy today.  A band aid and vaseline ointment has been placed over the site.  This should remain in place for 24 hours.  It is recommended that you keep the area dry for the first 24 hours.  After 24 hours, you may remove the band aid and wash the area with warm soap and water and apply Vaseline jelly.  Many patients prefer to use Neosporin or Bacitracin ointment.  This is acceptable; however, know that you can develop an allergy to this medication even if you have used it safely for years.  It is important to keep the area moist.  Letting it dry out and get air slows healing time, and will worsen the scar.  Band aid is optional after first 24 hours.      If you notice increasing redness, tenderness, pain, or yellow drainage at the biopsy site, please notify your doctor.  These are signs of an infection.    If your biopsy site is bleeding, apply firm pressure for 15 minutes straight.  Repeat for another 15 minutes, if it is still bleeding.   If the surgical site continues to bleed, then please contact your doctor.      [facility]   DERMATOLOGY  [address]   [place] [address]   Dept: [phone]   Dept Fax: [phone]

## 2018-06-26 ENCOUNTER — OFFICE VISIT (OUTPATIENT)
Dept: INTERNAL MEDICINE | Facility: CLINIC | Age: 78
End: 2018-06-26
Payer: MEDICARE

## 2018-06-26 VITALS
WEIGHT: 162.06 LBS | DIASTOLIC BLOOD PRESSURE: 68 MMHG | TEMPERATURE: 97 F | HEART RATE: 74 BPM | OXYGEN SATURATION: 98 % | BODY MASS INDEX: 27.67 KG/M2 | SYSTOLIC BLOOD PRESSURE: 122 MMHG | HEIGHT: 64 IN

## 2018-06-26 DIAGNOSIS — H92.03 ACUTE OTALGIA, BILATERAL: ICD-10-CM

## 2018-06-26 DIAGNOSIS — H65.191 ACUTE MIDDLE EAR EFFUSION, RIGHT: Primary | ICD-10-CM

## 2018-06-26 PROCEDURE — 99999 PR PBB SHADOW E&M-EST. PATIENT-LVL III: CPT | Mod: PBBFAC,,, | Performed by: FAMILY MEDICINE

## 2018-06-26 PROCEDURE — 3074F SYST BP LT 130 MM HG: CPT | Mod: CPTII,S$GLB,, | Performed by: FAMILY MEDICINE

## 2018-06-26 PROCEDURE — 3078F DIAST BP <80 MM HG: CPT | Mod: CPTII,S$GLB,, | Performed by: FAMILY MEDICINE

## 2018-06-26 PROCEDURE — 99214 OFFICE O/P EST MOD 30 MIN: CPT | Mod: S$GLB,,, | Performed by: FAMILY MEDICINE

## 2018-06-26 NOTE — PROGRESS NOTES
Subjective:       Patient ID: Violet Swenson is a 78 y.o. female.    Chief Complaint: Otalgia    Left ear shooting pains; right ear aching; x 1 day. Tried debrox this morning, nothing came out. No fever, chills. No congestion, runny nose. She reports nasal sores that are friable, ointment has not helped.      Review of Systems   Constitutional: Negative for chills, fatigue, fever and unexpected weight change.   HENT: Positive for ear pain. Negative for congestion and rhinorrhea.    Eyes: Negative for visual disturbance.   Respiratory: Negative for shortness of breath.    Cardiovascular: Negative for chest pain.   Musculoskeletal: Negative for myalgias.   Neurological: Negative for headaches.       Objective:      Physical Exam   Constitutional: She is oriented to person, place, and time. She appears well-developed and well-nourished. No distress.   HENT:   Head: Normocephalic and atraumatic.       Right Ear: Tympanic membrane is bulging. Tympanic membrane is not erythematous. A middle ear effusion is present.   Left Ear: Tympanic membrane normal.   Eyes: Conjunctivae and EOM are normal. Pupils are equal, round, and reactive to light. No scleral icterus.   Cardiovascular: Normal rate and regular rhythm.  Exam reveals no gallop and no friction rub.    No murmur heard.  Pulmonary/Chest: Effort normal and breath sounds normal.   Neurological: She is alert and oriented to person, place, and time. No cranial nerve deficit. Gait normal.   Psychiatric: She has a normal mood and affect.   Vitals reviewed.      Assessment:       1. Acute middle ear effusion, right    2. Acute otalgia, bilateral        Plan:     Problem List Items Addressed This Visit     None      Visit Diagnoses     Acute middle ear effusion, right    -  Primary    Relevant Orders    Ambulatory referral to ENT    Acute otalgia, bilateral        Relevant Orders    Ambulatory referral to ENT

## 2018-06-27 RX ORDER — METOPROLOL TARTRATE 25 MG/1
TABLET, FILM COATED ORAL
Qty: 60 TABLET | Refills: 2 | Status: SHIPPED | OUTPATIENT
Start: 2018-06-27 | End: 2018-10-08 | Stop reason: SDUPTHER

## 2018-06-28 ENCOUNTER — CLINICAL SUPPORT (OUTPATIENT)
Dept: AUDIOLOGY | Facility: CLINIC | Age: 78
End: 2018-06-28
Payer: MEDICARE

## 2018-06-28 ENCOUNTER — OFFICE VISIT (OUTPATIENT)
Dept: OTOLARYNGOLOGY | Facility: CLINIC | Age: 78
End: 2018-06-28
Payer: MEDICARE

## 2018-06-28 VITALS
SYSTOLIC BLOOD PRESSURE: 121 MMHG | DIASTOLIC BLOOD PRESSURE: 76 MMHG | BODY MASS INDEX: 27.7 KG/M2 | WEIGHT: 161.38 LBS | HEART RATE: 96 BPM | TEMPERATURE: 97 F

## 2018-06-28 DIAGNOSIS — H90.3 SENSORINEURAL HEARING LOSS (SNHL) OF BOTH EARS: ICD-10-CM

## 2018-06-28 DIAGNOSIS — H90.8 HEARING LOSS, MIXED, UNILATERAL: Primary | ICD-10-CM

## 2018-06-28 DIAGNOSIS — J34.89 NASAL VESTIBULITIS: ICD-10-CM

## 2018-06-28 DIAGNOSIS — H92.03 OTALGIA OF BOTH EARS: Primary | ICD-10-CM

## 2018-06-28 PROCEDURE — 3074F SYST BP LT 130 MM HG: CPT | Mod: CPTII,S$GLB,, | Performed by: PHYSICIAN ASSISTANT

## 2018-06-28 PROCEDURE — 3078F DIAST BP <80 MM HG: CPT | Mod: CPTII,S$GLB,, | Performed by: PHYSICIAN ASSISTANT

## 2018-06-28 PROCEDURE — 99999 PR PBB SHADOW E&M-EST. PATIENT-LVL IV: CPT | Mod: PBBFAC,,, | Performed by: PHYSICIAN ASSISTANT

## 2018-06-28 PROCEDURE — 92557 COMPREHENSIVE HEARING TEST: CPT | Mod: S$GLB,,, | Performed by: AUDIOLOGIST

## 2018-06-28 PROCEDURE — 99204 OFFICE O/P NEW MOD 45 MIN: CPT | Mod: S$GLB,,, | Performed by: PHYSICIAN ASSISTANT

## 2018-06-28 PROCEDURE — 92567 TYMPANOMETRY: CPT | Mod: S$GLB,,, | Performed by: AUDIOLOGIST

## 2018-06-28 RX ORDER — MUPIROCIN 20 MG/G
OINTMENT TOPICAL 2 TIMES DAILY
Qty: 15 G | Refills: 3 | Status: SHIPPED | OUTPATIENT
Start: 2018-06-28 | End: 2018-07-12

## 2018-06-28 NOTE — PROGRESS NOTES
Violet Swenson was seen 06/28/2018 for an audiological evaluation.  Patient complains of bilateral ear pain with onset 2 days ago. She reports that the left ear pain is sharp shooting and the right ear has a constant ache in it. She reports no change in her hearing. She complains of pulsatile tinnitus off and on for the past few months and also ringing at times.     Results reveal a mild-to-moderate mixed hearing loss 250-1000 and 5900-6231 Hz for the right ear, and  mild-to-moderate sensorineural hearing loss 750-1000 ans 1618-4878 Hz for the left ear.   Speech Reception Thresholds were  25 dBHL for the right ear and 25 dBHL for the left ear.   Word recognition scores were excellent for the right ear and excellent for the left ear.   Tympanograms were Type As, shallow for the right ear and Type As, shallow for the left ear.    Patient was counseled on the above findings.    Recommendations include:    1.  ENT followup  2.  Recheck per ENT  3.  Wear hearing protective devices around loud noise  4.  Annual audiograms

## 2018-06-28 NOTE — LETTER
June 28, 2018      Marti Coronel MD  [address] [place] [address] [address] [place] [address]           [facility]  [address] [facility] [place] [address]  Phone: [phone]  Fax: [phone]          Patient: Violet Swenson   MR Number: [id number]   YOB: 1940   Date of Visit: 6/28/2018       Dear Dr. Marti Coronel:    Thank you for referring Violet Swenson to me for evaluation. Attached you will find relevant portions of my assessment and plan of care.    If you have questions, please do not hesitate to call me. I look forward to following Violet Swenson along with you.    Sincerely,    Sara Pepe PA-C    Enclosure  CC:  No Recipients    If you would like to receive this communication electronically, please contact [email or web address] or [phone] to request more information on [employer] Link access.    For providers and/or their staff who would like to refer a patient to [facility], please contact us through our one-stop-shop provider referral line, [facility], at [phone].    If you feel you have received this communication in error or would no longer like to receive these types of communications, please e-mail [email or web address]

## 2018-06-28 NOTE — PROGRESS NOTES
"Subjective:       Patient ID: Violet Swenson is a 78 y.o. female.    Chief Complaint: Otalgia (Bilateral, right ear hurts all the time.  Left ear sharp pain on & off)    Patient is a very pleasant 78 year old female here to see me today for the first time in consultation at the request of Dr. Coronel for evaluation of otalgia.  She describes brief sharp pains in her left ear starting 3 days ago; worse if she turns her head to the left.  Right ear ache started the next day; denies exacerbating factors; gets some relief with heat.  Denies hearing loss; denies dizziness; uses walker for imbalance.  She has occasional tinnitus AU (roaring) with rare pulsatile tinnitus.  She denies grinding or clenching her teeth.  Says she needs work done on left lower molar but she "hates going to the dentist."   She also complains of dryness in her nose with recurrent pustules and associated tenderness.  Started 2 months ago after chemo.  She has Flonase but not used in past 2-3 weeks.  Denies epistaxis.  She has applied Neosporin with no change.  She's used a needle to pop a few intranasal pustules.  She does not smoke.  Denies fever.      Review of Systems   Constitutional: Negative for activity change, appetite change and fever.   HENT: Positive for ear pain, rhinorrhea (clear) and tinnitus. Negative for congestion, ear discharge, hearing loss, nosebleeds, postnasal drip, sinus pain, sinus pressure, sore throat, trouble swallowing and voice change.    Eyes: Negative for discharge.   Respiratory: Negative for cough, shortness of breath and wheezing.    Cardiovascular: Negative for chest pain and palpitations.   Gastrointestinal: Positive for diarrhea. Negative for nausea and vomiting.   Musculoskeletal: Positive for arthralgias and gait problem (uses walker).   Allergic/Immunologic: Negative for food allergies.   Neurological: Negative for dizziness, light-headedness and headaches.   Hematological: Negative for " adenopathy.   Psychiatric/Behavioral: Positive for sleep disturbance. Negative for confusion.       Objective:      Physical Exam   Constitutional: She is oriented to person, place, and time. She appears well-developed and well-nourished. No distress.   HENT:   Head: Normocephalic and atraumatic.   Right Ear: External ear and ear canal normal. No drainage, swelling or tenderness. Tympanic membrane is retracted. Tympanic membrane is not erythematous and not bulging. No middle ear effusion.   Left Ear: External ear and ear canal normal. No drainage, swelling or tenderness. Tympanic membrane is retracted. Tympanic membrane is not erythematous and not bulging.  No middle ear effusion.   Nose: Nose normal. No mucosal edema (dry mucosa, with mild erythema at base of nares), rhinorrhea, sinus tenderness (no pustules seen), nasal deformity or septal deviation. No epistaxis. Right sinus exhibits no maxillary sinus tenderness and no frontal sinus tenderness. Left sinus exhibits no maxillary sinus tenderness and no frontal sinus tenderness.   Mouth/Throat: Uvula is midline, oropharynx is clear and moist and mucous membranes are normal. Mucous membranes are not pale and not dry. No trismus in the jaw. Abnormal dentition. Dental caries present. No uvula swelling. No oropharyngeal exudate or posterior oropharyngeal erythema.   Small amount of wax in bilateral EAC (removed today using otomicroscope; pt tolerated procedure well).   Eyes: Conjunctivae, EOM and lids are normal. Pupils are equal, round, and reactive to light. Right eye exhibits no chemosis. Left eye exhibits no chemosis. Right conjunctiva is not injected. Left conjunctiva is not injected. No scleral icterus. Right eye exhibits normal extraocular motion and no nystagmus. Left eye exhibits normal extraocular motion and no nystagmus.   eyeglasses   Neck: Trachea normal and phonation normal. No tracheal tenderness present. No tracheal deviation present. No thyroid mass  and no thyromegaly present.   Cardiovascular: Intact distal pulses.    Pulmonary/Chest: Effort normal. No stridor. No respiratory distress.   Abdominal: She exhibits no distension.   Lymphadenopathy:        Head (right side): No submental, no submandibular, no preauricular and no posterior auricular adenopathy present.        Head (left side): No submental, no submandibular, no preauricular and no posterior auricular adenopathy present.     She has no cervical adenopathy.   Neurological: She is alert and oriented to person, place, and time. No cranial nerve deficit.   Skin: Skin is warm and dry. No rash noted. No erythema.   Psychiatric: She has a normal mood and affect. Her behavior is normal.         AUDIOGRAM:  Results reveal a mild-to-moderate mixed hearing loss 250-1000 and 9681-9508 Hz for the right ear, and  mild-to-moderate sensorineural hearing loss 750-1000 ans 0848-2183 Hz for the left ear.   Speech Reception Thresholds were  25 dBHL for the right ear and 25 dBHL for the left ear.   Word recognition scores were excellent for the right ear and excellent for the left ear.   Tympanograms were Type As, shallow for the right ear and Type As, shallow for the left ear.            Assessment:       1. Otalgia of both ears    2. Sensorineural hearing loss (SNHL) of both ears    3. Nasal vestibulitis        Plan:         Reviewed results of today's audiogram with patient in great detail.  Her tympanograms are shallow bilaterally but not flat which we typically see with LATONIA.  I reassured her that her ear exam today is normal with no signs of otitis externa and no erythema of the tympanic membrane.  She does have hearing loss in both ears with asymmetry at 250 and 600Hz.  Recommend repeat audiogram in 6 months for comparison; sooner if she appreciates any hearing loss, especially if unilateral.    We discussed that otalgia can be referred from TMJ.  She has a bad lower tooth and does favor one side while chewing  because of it.  We had a long discussion regarding the underlying pathology of temporomandibular joint dysfunction (TMD) as the cause of ear pain.  We further discussed conservative measures to treat TMD including avoiding gum and other foods that require lots of chewing, warm compresses, and scheduled antinflammatories.  If the pain persists, I recommend she schedule an appointment with a dentist for further evaluation.  She is adamant that she does not want to go back to the dentist EVER after extraction done few months ago.    I do not see any pustules in her nose today.  She last popped one about one week ago.  I recommend topical Bactroban BID x 10 days as some patients can carry staph in the nose.  Instructed her to call with any worsening pain or swelling of the nose as she would need oral antibiotic at that time as well.  Also discussed RTC if worsening or when pustule present so culture can be obtained.  Recommend she disinfect the nasal applicator of Flonase with alcohol before and after each use.  She voiced understanding.         Thanks for the referral Dr. Coronel.  Report returned via EPIC.

## 2018-07-05 DIAGNOSIS — F41.8 SITUATIONAL ANXIETY: ICD-10-CM

## 2018-07-06 NOTE — TELEPHONE ENCOUNTER
Pharmacist notified and states the patient did refill medication on 06/18/18 but no longer has refills available. If this medication were to be refilled, the refills would be added for future refills.

## 2018-07-09 RX ORDER — ALPRAZOLAM 0.25 MG/1
TABLET ORAL
Qty: 60 TABLET | Refills: 0 | Status: SHIPPED | OUTPATIENT
Start: 2018-07-09 | End: 2018-08-02 | Stop reason: SDUPTHER

## 2018-07-19 ENCOUNTER — LAB VISIT (OUTPATIENT)
Dept: LAB | Facility: HOSPITAL | Age: 78
End: 2018-07-19
Attending: INTERNAL MEDICINE
Payer: MEDICARE

## 2018-07-19 DIAGNOSIS — C85.83 LARGE CELL LYMPHOMA OF INTRA-ABDOMINAL LYMPH NODES: Chronic | ICD-10-CM

## 2018-07-19 DIAGNOSIS — D53.9 MACROCYTIC ANEMIA: ICD-10-CM

## 2018-07-19 DIAGNOSIS — E78.5 HYPERLIPIDEMIA, UNSPECIFIED HYPERLIPIDEMIA TYPE: Chronic | ICD-10-CM

## 2018-07-19 LAB
ALBUMIN SERPL BCP-MCNC: 3.4 G/DL
ALBUMIN SERPL BCP-MCNC: 3.4 G/DL
ALP SERPL-CCNC: 96 U/L
ALP SERPL-CCNC: 96 U/L
ALT SERPL W/O P-5'-P-CCNC: 75 U/L
ALT SERPL W/O P-5'-P-CCNC: 75 U/L
ANION GAP SERPL CALC-SCNC: 6 MMOL/L
ANION GAP SERPL CALC-SCNC: 6 MMOL/L
AST SERPL-CCNC: 83 U/L
AST SERPL-CCNC: 83 U/L
BASOPHILS # BLD AUTO: 0.01 K/UL
BASOPHILS NFR BLD: 0.2 %
BILIRUB SERPL-MCNC: 0.4 MG/DL
BILIRUB SERPL-MCNC: 0.4 MG/DL
BUN SERPL-MCNC: 26 MG/DL
BUN SERPL-MCNC: 26 MG/DL
CALCIUM SERPL-MCNC: 9.2 MG/DL
CALCIUM SERPL-MCNC: 9.2 MG/DL
CHLORIDE SERPL-SCNC: 114 MMOL/L
CHLORIDE SERPL-SCNC: 114 MMOL/L
CHOLEST SERPL-MCNC: 92 MG/DL
CHOLEST/HDLC SERPL: 1.7 {RATIO}
CO2 SERPL-SCNC: 21 MMOL/L
CO2 SERPL-SCNC: 21 MMOL/L
CREAT SERPL-MCNC: 1.2 MG/DL
CREAT SERPL-MCNC: 1.2 MG/DL
DIFFERENTIAL METHOD: ABNORMAL
EOSINOPHIL # BLD AUTO: 0.2 K/UL
EOSINOPHIL NFR BLD: 3.2 %
ERYTHROCYTE [DISTWIDTH] IN BLOOD BY AUTOMATED COUNT: 15.9 %
EST. GFR  (AFRICAN AMERICAN): 50 ML/MIN/1.73 M^2
EST. GFR  (AFRICAN AMERICAN): 50 ML/MIN/1.73 M^2
EST. GFR  (NON AFRICAN AMERICAN): 44 ML/MIN/1.73 M^2
EST. GFR  (NON AFRICAN AMERICAN): 44 ML/MIN/1.73 M^2
FERRITIN SERPL-MCNC: 28 NG/ML
FOLATE SERPL-MCNC: 16.4 NG/ML
GLUCOSE SERPL-MCNC: 84 MG/DL
GLUCOSE SERPL-MCNC: 84 MG/DL
HCT VFR BLD AUTO: 33.7 %
HDLC SERPL-MCNC: 55 MG/DL
HDLC SERPL: 59.8 %
HGB BLD-MCNC: 10.2 G/DL
IRON SERPL-MCNC: 85 UG/DL
LDLC SERPL CALC-MCNC: 26.4 MG/DL
LYMPHOCYTES # BLD AUTO: 3.1 K/UL
LYMPHOCYTES NFR BLD: 50.8 %
MCH RBC QN AUTO: 31.9 PG
MCHC RBC AUTO-ENTMCNC: 30.3 G/DL
MCV RBC AUTO: 105 FL
MONOCYTES # BLD AUTO: 0.5 K/UL
MONOCYTES NFR BLD: 8 %
NEUTROPHILS # BLD AUTO: 2.3 K/UL
NEUTROPHILS NFR BLD: 37.8 %
NONHDLC SERPL-MCNC: 37 MG/DL
PLATELET # BLD AUTO: 194 K/UL
PMV BLD AUTO: 10.1 FL
POTASSIUM SERPL-SCNC: 5.7 MMOL/L
POTASSIUM SERPL-SCNC: 5.7 MMOL/L
PROT SERPL-MCNC: 6.3 G/DL
PROT SERPL-MCNC: 6.3 G/DL
RBC # BLD AUTO: 3.2 M/UL
SATURATED IRON: 16 %
SODIUM SERPL-SCNC: 141 MMOL/L
SODIUM SERPL-SCNC: 141 MMOL/L
TOTAL IRON BINDING CAPACITY: 521 UG/DL
TRANSFERRIN SERPL-MCNC: 352 MG/DL
TRIGL SERPL-MCNC: 53 MG/DL
VIT B12 SERPL-MCNC: >2000 PG/ML
WBC # BLD AUTO: 6.02 K/UL

## 2018-07-19 PROCEDURE — 85025 COMPLETE CBC W/AUTO DIFF WBC: CPT

## 2018-07-19 PROCEDURE — 82746 ASSAY OF FOLIC ACID SERUM: CPT

## 2018-07-19 PROCEDURE — 82607 VITAMIN B-12: CPT

## 2018-07-19 PROCEDURE — 36415 COLL VENOUS BLD VENIPUNCTURE: CPT

## 2018-07-19 PROCEDURE — 82728 ASSAY OF FERRITIN: CPT

## 2018-07-19 PROCEDURE — 83540 ASSAY OF IRON: CPT

## 2018-07-19 PROCEDURE — 80053 COMPREHEN METABOLIC PANEL: CPT

## 2018-07-19 PROCEDURE — 80061 LIPID PANEL: CPT

## 2018-07-23 ENCOUNTER — TELEPHONE (OUTPATIENT)
Dept: DERMATOLOGY | Facility: CLINIC | Age: 78
End: 2018-07-23

## 2018-07-23 ENCOUNTER — TELEPHONE (OUTPATIENT)
Dept: RADIOLOGY | Facility: HOSPITAL | Age: 78
End: 2018-07-23

## 2018-07-23 NOTE — TELEPHONE ENCOUNTER
Returned call to pt.  Pt was unaware that her biopsy results where released througg [facility].  Pt was given results, verbalized understanding and had no further questions other than she wants to keep her appt with Dr Geiger in September.

## 2018-07-25 ENCOUNTER — HOSPITAL ENCOUNTER (OUTPATIENT)
Dept: RADIOLOGY | Facility: HOSPITAL | Age: 78
Discharge: HOME | End: 2018-07-25
Attending: INTERNAL MEDICINE
Payer: MEDICARE

## 2018-07-25 DIAGNOSIS — C85.83 LARGE CELL LYMPHOMA OF INTRA-ABDOMINAL LYMPH NODES: Chronic | ICD-10-CM

## 2018-07-25 PROCEDURE — A9552 F18 FDG: HCPCS

## 2018-07-25 PROCEDURE — 78815 PET IMAGE W/CT SKULL-THIGH: CPT | Mod: 26,PS,, | Performed by: RADIOLOGY

## 2018-07-25 PROCEDURE — 78815 PET IMAGE W/CT SKULL-THIGH: CPT | Mod: TC

## 2018-07-26 ENCOUNTER — CLINICAL SUPPORT (OUTPATIENT)
Dept: CARDIOLOGY | Facility: CLINIC | Age: 78
End: 2018-07-26
Attending: INTERNAL MEDICINE
Payer: MEDICARE

## 2018-07-26 ENCOUNTER — CLINICAL SUPPORT (OUTPATIENT)
Dept: CARDIOLOGY | Facility: CLINIC | Age: 78
End: 2018-07-26
Payer: MEDICARE

## 2018-07-26 ENCOUNTER — OFFICE VISIT (OUTPATIENT)
Dept: CARDIOLOGY | Facility: CLINIC | Age: 78
End: 2018-07-26
Payer: MEDICARE

## 2018-07-26 VITALS
WEIGHT: 160.06 LBS | HEIGHT: 64 IN | SYSTOLIC BLOOD PRESSURE: 122 MMHG | DIASTOLIC BLOOD PRESSURE: 84 MMHG | HEART RATE: 82 BPM | BODY MASS INDEX: 27.33 KG/M2 | OXYGEN SATURATION: 99 %

## 2018-07-26 DIAGNOSIS — I44.2 CHB (COMPLETE HEART BLOCK): ICD-10-CM

## 2018-07-26 DIAGNOSIS — Z96.653 S/P TKR (TOTAL KNEE REPLACEMENT), BILATERAL: Chronic | ICD-10-CM

## 2018-07-26 DIAGNOSIS — I25.5 ISCHEMIC CARDIOMYOPATHY: Chronic | ICD-10-CM

## 2018-07-26 DIAGNOSIS — I48.0 PAROXYSMAL ATRIAL FIBRILLATION: Chronic | ICD-10-CM

## 2018-07-26 DIAGNOSIS — N18.30 STAGE 3 CHRONIC KIDNEY DISEASE: Chronic | ICD-10-CM

## 2018-07-26 DIAGNOSIS — Z95.0 PACEMAKER: Chronic | ICD-10-CM

## 2018-07-26 DIAGNOSIS — C85.83 LARGE CELL LYMPHOMA OF INTRA-ABDOMINAL LYMPH NODES: Chronic | ICD-10-CM

## 2018-07-26 DIAGNOSIS — I10 ESSENTIAL HYPERTENSION: Chronic | ICD-10-CM

## 2018-07-26 DIAGNOSIS — Z86.79 HISTORY OF CONGESTIVE HEART FAILURE: ICD-10-CM

## 2018-07-26 DIAGNOSIS — Z95.0 CARDIAC PACEMAKER IN SITU: ICD-10-CM

## 2018-07-26 DIAGNOSIS — I25.10 CAD (CORONARY ARTERY DISEASE): Primary | ICD-10-CM

## 2018-07-26 DIAGNOSIS — I25.10 CAD (CORONARY ARTERY DISEASE): ICD-10-CM

## 2018-07-26 DIAGNOSIS — I25.10 CORONARY ARTERY DISEASE INVOLVING NATIVE CORONARY ARTERY OF NATIVE HEART WITHOUT ANGINA PECTORIS: Primary | Chronic | ICD-10-CM

## 2018-07-26 DIAGNOSIS — Z98.84 S/P GASTRIC BYPASS: Chronic | ICD-10-CM

## 2018-07-26 DIAGNOSIS — G62.0 CHEMOTHERAPY-INDUCED NEUROPATHY: Chronic | ICD-10-CM

## 2018-07-26 DIAGNOSIS — E78.2 MIXED HYPERLIPIDEMIA: Chronic | ICD-10-CM

## 2018-07-26 DIAGNOSIS — T45.1X5A CHEMOTHERAPY-INDUCED NEUROPATHY: Chronic | ICD-10-CM

## 2018-07-26 DIAGNOSIS — I70.0 CALCIFICATION OF AORTA: Chronic | ICD-10-CM

## 2018-07-26 DIAGNOSIS — M1A.09X0 IDIOPATHIC CHRONIC GOUT OF MULTIPLE SITES WITHOUT TOPHUS: Chronic | ICD-10-CM

## 2018-07-26 PROCEDURE — 3074F SYST BP LT 130 MM HG: CPT | Mod: CPTII,S$GLB,, | Performed by: INTERNAL MEDICINE

## 2018-07-26 PROCEDURE — 93280 PM DEVICE PROGR EVAL DUAL: CPT | Mod: S$GLB,,, | Performed by: INTERNAL MEDICINE

## 2018-07-26 PROCEDURE — 3079F DIAST BP 80-89 MM HG: CPT | Mod: CPTII,S$GLB,, | Performed by: INTERNAL MEDICINE

## 2018-07-26 PROCEDURE — 99999 PR PBB SHADOW E&M-EST. PATIENT-LVL III: CPT | Mod: PBBFAC,,, | Performed by: INTERNAL MEDICINE

## 2018-07-26 PROCEDURE — 99214 OFFICE O/P EST MOD 30 MIN: CPT | Mod: S$GLB,,, | Performed by: INTERNAL MEDICINE

## 2018-07-26 PROCEDURE — 93000 ELECTROCARDIOGRAM COMPLETE: CPT | Mod: S$GLB,,, | Performed by: INTERNAL MEDICINE

## 2018-07-26 NOTE — PROGRESS NOTES
Subjective:   Patient ID:  Violet Swenson is a 78 y.o. female who presents for follow up of No chief complaint on file.      HPI  A 79 yo female with  cad s/p ptca chemo induced cardiomyopathy s/p removal of portactah   htn hlp s/p pacer  worsening renal dysfunction off losartan is here for f/u has no chf symptoms no chest pain no syncope near syncope feels her heart race at times.she gets easily fatigued no exercise. No syncope near syncope. He tires out in the middle of the day.no angina no arrythmias clinically.lipids on target.  Past Medical History:   Diagnosis Date    Anemia     Anxiety     Arthritis     Cancer     lymphoma Large cell B    Coronary artery disease     01/2015 LHC patent LCX. 50% stenosis in LAD and RCA.      Depression     Disorder of kidney and ureter     Encounter for blood transfusion     GERD (gastroesophageal reflux disease)     Gout, arthritis     Heart failure     Hx of psychiatric care     Hyperlipidemia     Hypertension     Hypothyroidism     Immune deficiency disorder     Lung nodule 2014    RML--stable    Obesity     Pacemaker     Metronic    Paroxysmal atrial fibrillation 3/15/2018    Pneumonia     Polyneuropathy     chemo induced    Psychiatric problem     Tobacco dependence     quit 1976    Trouble in sleeping        Past Surgical History:   Procedure Laterality Date    APPENDECTOMY  1966 approx    CARDIAC PACEMAKER PLACEMENT  01/22/2015    CHOLECYSTECTOMY  1993    incidental at time of gastric bypass    COLON SURGERY Right 2017    hemicolectomy    COLONOSCOPY N/A 4/6/2017    Procedure: COLONOSCOPY;  Surgeon: Tye Enamorado MD;  Location: [facility];  Service: Endoscopy;  Laterality: N/A;    CORONARY ANGIOPLASTY  02/2014    CORONARY STENT PLACEMENT  02/05/2014    GASTRIC BYPASS  1993    with incidental choly    HERNIA REPAIR      JOINT REPLACEMENT Bilateral 2009    3 months apart    TONSILLECTOMY  1959       Social History   Substance  "Use Topics    Smoking status: Former Smoker     Packs/day: 2.00     Years: 6.00     Quit date: 3/15/1976    Smokeless tobacco: Never Used    Alcohol use No       Family History   Problem Relation Age of Onset    Heart disease Mother     Hypertension Mother     Cataracts Mother     Stomach cancer Father         "ulcers that turned to cancer"    Cancer Father         stomach    Pancreatic cancer Sister     Cancer Sister         pancreatic    Leukemia Brother         "leukemia which led to intestinal cancer"    Cataracts Brother     Cancer Brother         leukemia then later stomach cancer    Drug abuse Son     Cancer Son         prostate cancer    Prostate cancer Son     COPD Daughter     Asthma Daughter     Hypertension Maternal Grandfather     Stroke Maternal Grandfather     Alcohol abuse Neg Hx     Diabetes Neg Hx     Mental retardation Neg Hx     Mental illness Neg Hx        Current Outpatient Prescriptions   Medication Sig    allopurinol (ZYLOPRIM) 300 MG tablet Take 1 tablet (300 mg total) by mouth once daily.    ALPRAZolam (XANAX) 0.25 MG tablet TAKE ONE TABLET BY MOUTH TWICE DAILY AS NEEDED FOR ANXIETY    ascorbic acid, vitamin C, (VITAMIN C) 100 MG tablet Take by mouth once daily.    aspirin (ECOTRIN) 81 MG EC tablet Take 81 mg by mouth nightly.     clopidogrel (PLAVIX) 75 mg tablet TAKE ONE TABLET BY MOUTH EVERY DAY    COLCRYS 0.6 mg tablet Take 1 tablet (0.6 mg total) by mouth once daily.    diclofenac sodium 1 % Gel Apply 2 g topically once daily. Apply 2 g over painful joints once or twice a day.    ergocalciferol (ERGOCALCIFEROL) 50,000 unit Cap Take 1 capsule (50,000 Units total) by mouth every 7 days.    fluticasone (FLONASE) 50 mcg/actuation nasal spray 2 sprays by Each Nare route once daily. (Patient taking differently: 2 sprays by Each Nare route daily as needed. )    furosemide (LASIX) 20 MG tablet Take 1 tablet (20 mg total) by mouth once daily.    gabapentin " (NEURONTIN) 100 MG capsule TAKE TWO CAPSULES BY MOUTH THREE TIMES DAILY    isosorbide mononitrate (IMDUR) 30 MG 24 hr tablet Take 1 tablet (30 mg total) by mouth nightly.    levothyroxine (SYNTHROID) 75 MCG tablet TAKE 1 TABLET(75 MCG) BY MOUTH BEFORE BREAKFAST    loratadine (CLARITIN) 10 mg tablet Take 1 tablet (10 mg total) by mouth once daily.    meloxicam (MOBIC) 7.5 MG tablet TAKE ONE TABLET BY MOUTH EVERY DAY AS NEEDED FOR PAIN    metoprolol tartrate (LOPRESSOR) 25 MG tablet TAKE ONE TABLET BY MOUTH TWICE DAILY    mirtazapine (REMERON SOL-TAB) 15 MG disintegrating tablet DISSOLVE ONE TABLET BY MOUTH NIGHTLY    multivitamin (ONE DAILY MULTIVITAMIN) per tablet Take 1 tablet by mouth once daily.    ranitidine (ZANTAC) 150 MG capsule Take 150 mg by mouth nightly.     rosuvastatin (CRESTOR) 10 MG tablet TAKE ONE TABLET BY MOUTH EVERY EVENING    sertraline (ZOLOFT) 100 MG tablet TAKE 1.5 TABLETS BY MOUTH ONCE DAILY    sodium bicarbonate 650 MG tablet Take 1 tablet (650 mg total) by mouth 2 (two) times daily.    ZINC ACETATE ORAL Take 250 mg by mouth once daily.     losartan (COZAAR) 25 MG tablet Take 1 tablet (25 mg total) by mouth once daily.     No current facility-administered medications for this visit.      Current Outpatient Prescriptions on File Prior to Visit   Medication Sig    allopurinol (ZYLOPRIM) 300 MG tablet Take 1 tablet (300 mg total) by mouth once daily.    ALPRAZolam (XANAX) 0.25 MG tablet TAKE ONE TABLET BY MOUTH TWICE DAILY AS NEEDED FOR ANXIETY    ascorbic acid, vitamin C, (VITAMIN C) 100 MG tablet Take by mouth once daily.    aspirin (ECOTRIN) 81 MG EC tablet Take 81 mg by mouth nightly.     clopidogrel (PLAVIX) 75 mg tablet TAKE ONE TABLET BY MOUTH EVERY DAY    COLCRYS 0.6 mg tablet Take 1 tablet (0.6 mg total) by mouth once daily.    diclofenac sodium 1 % Gel Apply 2 g topically once daily. Apply 2 g over painful joints once or twice a day.    ergocalciferol  (ERGOCALCIFEROL) 50,000 unit Cap Take 1 capsule (50,000 Units total) by mouth every 7 days.    fluticasone (FLONASE) 50 mcg/actuation nasal spray 2 sprays by Each Nare route once daily. (Patient taking differently: 2 sprays by Each Nare route daily as needed. )    furosemide (LASIX) 20 MG tablet Take 1 tablet (20 mg total) by mouth once daily.    gabapentin (NEURONTIN) 100 MG capsule TAKE TWO CAPSULES BY MOUTH THREE TIMES DAILY    isosorbide mononitrate (IMDUR) 30 MG 24 hr tablet Take 1 tablet (30 mg total) by mouth nightly.    levothyroxine (SYNTHROID) 75 MCG tablet TAKE 1 TABLET(75 MCG) BY MOUTH BEFORE BREAKFAST    loratadine (CLARITIN) 10 mg tablet Take 1 tablet (10 mg total) by mouth once daily.    meloxicam (MOBIC) 7.5 MG tablet TAKE ONE TABLET BY MOUTH EVERY DAY AS NEEDED FOR PAIN    metoprolol tartrate (LOPRESSOR) 25 MG tablet TAKE ONE TABLET BY MOUTH TWICE DAILY    mirtazapine (REMERON SOL-TAB) 15 MG disintegrating tablet DISSOLVE ONE TABLET BY MOUTH NIGHTLY    multivitamin (ONE DAILY MULTIVITAMIN) per tablet Take 1 tablet by mouth once daily.    ranitidine (ZANTAC) 150 MG capsule Take 150 mg by mouth nightly.     rosuvastatin (CRESTOR) 10 MG tablet TAKE ONE TABLET BY MOUTH EVERY EVENING    sertraline (ZOLOFT) 100 MG tablet TAKE 1.5 TABLETS BY MOUTH ONCE DAILY    sodium bicarbonate 650 MG tablet Take 1 tablet (650 mg total) by mouth 2 (two) times daily.    ZINC ACETATE ORAL Take 250 mg by mouth once daily.     losartan (COZAAR) 25 MG tablet Take 1 tablet (25 mg total) by mouth once daily.     No current facility-administered medications on file prior to visit.      Review of patient's allergies indicates:   Allergen Reactions    Corticosteroids (glucocorticoids) Nausea Only and Other (See Comments)     Stomach pain, dizziness, headache    Oxycodone Other (See Comments)     Blood pressure dropped       Review of Systems   Constitution: Positive for weakness, malaise/fatigue and weight gain.  Negative for diaphoresis.   HENT: Negative for hoarse voice.    Eyes: Negative for double vision and visual disturbance.   Cardiovascular: Negative for chest pain, claudication, cyanosis, dyspnea on exertion, irregular heartbeat, leg swelling, near-syncope, orthopnea, palpitations, paroxysmal nocturnal dyspnea and syncope.   Respiratory: Negative for cough, hemoptysis, shortness of breath and snoring.    Hematologic/Lymphatic: Negative for bleeding problem. Does not bruise/bleed easily.   Skin: Negative for color change and poor wound healing.   Musculoskeletal: Negative for muscle cramps, muscle weakness and myalgias.   Gastrointestinal: Negative for bloating, abdominal pain, change in bowel habit, diarrhea, heartburn, hematemesis, hematochezia, melena and nausea.   Neurological: Negative for excessive daytime sleepiness, dizziness, headaches, light-headedness, loss of balance and numbness.   Psychiatric/Behavioral: Negative for memory loss. The patient does not have insomnia.    Allergic/Immunologic: Negative for hives.       Objective:   Physical Exam   Constitutional: She is oriented to person, place, and time. She appears well-developed and well-nourished. She does not appear ill. No distress.   HENT:   Head: Normocephalic and atraumatic.   Eyes: EOM are normal. Pupils are equal, round, and reactive to light. No scleral icterus.   Neck: Normal range of motion. Neck supple. Normal carotid pulses, no hepatojugular reflux and no JVD present. Carotid bruit is not present. No tracheal deviation present. No thyromegaly present.   Cardiovascular: Normal rate, regular rhythm, normal heart sounds, intact distal pulses and normal pulses.  Exam reveals no gallop and no friction rub.    No murmur heard.  Pulses:       Carotid pulses are 2+ on the right side, and 2+ on the left side.       Radial pulses are 2+ on the right side, and 2+ on the left side.        Femoral pulses are 2+ on the right side, and 2+ on the left  "side.       Popliteal pulses are 2+ on the right side, and 2+ on the left side.        Dorsalis pedis pulses are 2+ on the right side, and 2+ on the left side.        Posterior tibial pulses are 2+ on the right side, and 2+ on the left side.   Pulmonary/Chest: Effort normal and breath sounds normal. No respiratory distress. She has no wheezes. She has no rhonchi. She has no rales. She exhibits no tenderness.   Pacer site well helaed.   Abdominal: Soft. Normal appearance, normal aorta and bowel sounds are normal. She exhibits no distension, no abdominal bruit, no ascites and no pulsatile midline mass. There is no hepatomegaly. There is no tenderness.   Musculoskeletal: She exhibits no edema.        Right shoulder: She exhibits no deformity.   Neurological: She is alert and oriented to person, place, and time. She has normal strength. No cranial nerve deficit. Coordination normal.   Skin: Skin is warm and dry. No rash noted. She is not diaphoretic. No cyanosis or erythema. Nails show no clubbing.   Psychiatric: She has a normal mood and affect. Her speech is normal and behavior is normal.   Nursing note and vitals reviewed.    Vitals:    07/26/18 1115 07/26/18 1117   BP: 117/80 122/84   BP Location: Left arm    Pulse: 82    SpO2: 99%    Weight: 72.6 kg (160 lb 0.9 oz)    Height: 5' 4" (1.626 m)      Lab Results   Component Value Date    CHOL 92 (L) 07/19/2018    CHOL 138 04/05/2017    CHOL 114 (L) 11/07/2016     Lab Results   Component Value Date    HDL 55 07/19/2018    HDL 58 04/05/2017    HDL 51 11/07/2016     Lab Results   Component Value Date    LDLCALC 26.4 (L) 07/19/2018    LDLCALC 62.8 (L) 04/05/2017    LDLCALC 41.8 (L) 11/07/2016     Lab Results   Component Value Date    TRIG 53 07/19/2018    TRIG 86 04/05/2017    TRIG 106 11/07/2016     Lab Results   Component Value Date    CHOLHDL 59.8 (H) 07/19/2018    CHOLHDL 42.0 04/05/2017    CHOLHDL 44.7 11/07/2016       Chemistry        Component Value Date/Time    NA " 141 07/19/2018 0856     07/19/2018 0856    K 5.7 (H) 07/19/2018 0856    K 5.7 (H) 07/19/2018 0856     (H) 07/19/2018 0856     (H) 07/19/2018 0856    CO2 21 (L) 07/19/2018 0856    CO2 21 (L) 07/19/2018 0856    BUN 26 (H) 07/19/2018 0856    BUN 26 (H) 07/19/2018 0856    CREATININE 1.2 07/19/2018 0856    CREATININE 1.2 07/19/2018 0856    GLU 84 07/19/2018 0856    GLU 84 07/19/2018 0856        Component Value Date/Time    CALCIUM 9.2 07/19/2018 0856    CALCIUM 9.2 07/19/2018 0856    ALKPHOS 96 07/19/2018 0856    ALKPHOS 96 07/19/2018 0856    AST 83 (H) 07/19/2018 0856    AST 83 (H) 07/19/2018 0856    ALT 75 (H) 07/19/2018 0856    ALT 75 (H) 07/19/2018 0856    BILITOT 0.4 07/19/2018 0856    BILITOT 0.4 07/19/2018 0856    ESTGFRAFRICA 50 (A) 07/19/2018 0856    ESTGFRAFRICA 50 (A) 07/19/2018 0856    EGFRNONAA 44 (A) 07/19/2018 0856    EGFRNONAA 44 (A) 07/19/2018 0856          Lab Results   Component Value Date    TSH 3.227 06/18/2018     Lab Results   Component Value Date    INR 1.0 09/01/2017    INR 1.0 04/05/2017     Lab Results   Component Value Date    WBC 6.02 07/19/2018    HGB 10.2 (L) 07/19/2018    HCT 33.7 (L) 07/19/2018     (H) 07/19/2018     07/19/2018     BMP  Sodium   Date Value Ref Range Status   07/19/2018 141 136 - 145 mmol/L Final   07/19/2018 141 136 - 145 mmol/L Final     Potassium   Date Value Ref Range Status   07/19/2018 5.7 (H) 3.5 - 5.1 mmol/L Final   07/19/2018 5.7 (H) 3.5 - 5.1 mmol/L Final     Chloride   Date Value Ref Range Status   07/19/2018 114 (H) 95 - 110 mmol/L Final   07/19/2018 114 (H) 95 - 110 mmol/L Final     CO2   Date Value Ref Range Status   07/19/2018 21 (L) 23 - 29 mmol/L Final   07/19/2018 21 (L) 23 - 29 mmol/L Final     BUN, Bld   Date Value Ref Range Status   07/19/2018 26 (H) 8 - 23 mg/dL Final   07/19/2018 26 (H) 8 - 23 mg/dL Final     Creatinine   Date Value Ref Range Status   07/19/2018 1.2 0.5 - 1.4 mg/dL Final   07/19/2018 1.2 0.5 - 1.4  mg/dL Final     Calcium   Date Value Ref Range Status   07/19/2018 9.2 8.7 - 10.5 mg/dL Final   07/19/2018 9.2 8.7 - 10.5 mg/dL Final     Anion Gap   Date Value Ref Range Status   07/19/2018 6 (L) 8 - 16 mmol/L Final   07/19/2018 6 (L) 8 - 16 mmol/L Final     eGFR if    Date Value Ref Range Status   07/19/2018 50 (A) >60 mL/min/1.73 m^2 Final   07/19/2018 50 (A) >60 mL/min/1.73 m^2 Final     eGFR if non    Date Value Ref Range Status   07/19/2018 44 (A) >60 mL/min/1.73 m^2 Final     Comment:     Calculation used to obtain the estimated glomerular filtration  rate (eGFR) is the CKD-EPI equation.      07/19/2018 44 (A) >60 mL/min/1.73 m^2 Final     Comment:     Calculation used to obtain the estimated glomerular filtration  rate (eGFR) is the CKD-EPI equation.        CrCl cannot be calculated (Patient's most recent lab result is older than the maximum 7 days allowed.).    Assessment:     1. Coronary artery disease : multiple vessels, s/p PTCA    2. Essential hypertension    3. Mixed hyperlipidemia    4. Pacemaker    5. Idiopathic chronic gout of multiple sites without tophus    6. Ischemic cardiomyopathy    7. S/P gastric bypass    8. Calcification of aorta    9. 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 Stage 3 chronic kidney disease    11. Chemotherapy-induced neuropathy    12. S/P TKR (total knee replacement), bilateral    13. Paroxysmal atrial fibrillation    14. History of congestive heart failure    stable clinically will interrogate pacer and reassess lvf .    Plan:   Continue current therapy repeat echo   Cardiac low salt diet.  Risk factor modification and excercise program.  F/u in 6 months with lipid cmp

## 2018-07-27 ENCOUNTER — OFFICE VISIT (OUTPATIENT)
Dept: HEMATOLOGY/ONCOLOGY | Facility: CLINIC | Age: 78
End: 2018-07-27
Payer: MEDICARE

## 2018-07-27 VITALS
TEMPERATURE: 98 F | SYSTOLIC BLOOD PRESSURE: 120 MMHG | HEIGHT: 64 IN | WEIGHT: 159.38 LBS | DIASTOLIC BLOOD PRESSURE: 80 MMHG | HEART RATE: 91 BPM | RESPIRATION RATE: 18 BRPM | BODY MASS INDEX: 27.21 KG/M2 | OXYGEN SATURATION: 98 %

## 2018-07-27 DIAGNOSIS — F51.01 PRIMARY INSOMNIA: ICD-10-CM

## 2018-07-27 DIAGNOSIS — C85.83 LARGE CELL LYMPHOMA OF INTRA-ABDOMINAL LYMPH NODES: ICD-10-CM

## 2018-07-27 DIAGNOSIS — G62.9 NEUROPATHY: ICD-10-CM

## 2018-07-27 DIAGNOSIS — D50.8 OTHER IRON DEFICIENCY ANEMIA: Primary | ICD-10-CM

## 2018-07-27 DIAGNOSIS — E78.5 HYPERLIPIDEMIA, UNSPECIFIED HYPERLIPIDEMIA TYPE: Chronic | ICD-10-CM

## 2018-07-27 DIAGNOSIS — C83.33 DIFFUSE LARGE B-CELL LYMPHOMA OF INTRA-ABDOMINAL LYMPH NODES: ICD-10-CM

## 2018-07-27 PROCEDURE — 99999 PR PBB SHADOW E&M-EST. PATIENT-LVL V: CPT | Mod: PBBFAC,,, | Performed by: INTERNAL MEDICINE

## 2018-07-27 PROCEDURE — 99214 OFFICE O/P EST MOD 30 MIN: CPT | Mod: S$GLB,,, | Performed by: INTERNAL MEDICINE

## 2018-07-27 PROCEDURE — 99499 UNLISTED E&M SERVICE: CPT | Mod: HCNC,S$GLB,, | Performed by: INTERNAL MEDICINE

## 2018-07-27 PROCEDURE — 3074F SYST BP LT 130 MM HG: CPT | Mod: CPTII,S$GLB,, | Performed by: INTERNAL MEDICINE

## 2018-07-27 PROCEDURE — 3079F DIAST BP 80-89 MM HG: CPT | Mod: CPTII,S$GLB,, | Performed by: INTERNAL MEDICINE

## 2018-07-27 RX ORDER — ROSUVASTATIN CALCIUM 10 MG/1
TABLET, COATED ORAL
Qty: 30 TABLET | Refills: 6 | Status: SHIPPED | OUTPATIENT
Start: 2018-07-27 | End: 2019-01-24

## 2018-07-27 RX ORDER — MIRTAZAPINE 15 MG/1
TABLET, ORALLY DISINTEGRATING ORAL
Qty: 30 TABLET | Refills: 2 | Status: SHIPPED | OUTPATIENT
Start: 2018-07-27 | End: 2018-10-25 | Stop reason: SDUPTHER

## 2018-07-27 RX ORDER — IRON,CARBONYL/ASCORBIC ACID 100-250 MG
1 TABLET ORAL DAILY
Qty: 30 TABLET | Refills: 3 | Status: SHIPPED | OUTPATIENT
Start: 2018-07-27 | End: 2018-11-23 | Stop reason: SDUPTHER

## 2018-07-27 RX ORDER — MIRTAZAPINE 15 MG/1
TABLET, ORALLY DISINTEGRATING ORAL
Qty: 30 TABLET | Status: CANCELLED | OUTPATIENT
Start: 2018-07-27

## 2018-07-27 NOTE — PROGRESS NOTES
Subjective:       Patient ID: Violet Swenson is a 78 y.o. female.    Chief Complaint: Follow-up    HPI This 78 year old  lady  comes for follow up of her th diffuse large cell lymphoma.   A CT of   the abdomen done on 04/05/2017, done because of abdominal pain ,  was reported as showing a 6.9 x 7.0 x 8.4 cm soft  tissue mass in the right lower quadrant in the terminal ileum abutting the   cecum. There was adjacent conglomerate adenopathy in the right lower quadrant   mesentery.     The patient had a colonoscopy that showed a lesion in the terminal ileum.     She underwent a right hemicolectomy and terminal ileum removal. 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  She had a Ct/PET that showed evidence of surgery and some non specific findings, but no evidence of activer disease elsewhere.  An ECHO showed normal cardiac ejection fraction, as wella s a MUGA.  She was seen cardiology and cleared for ADRIAMYCIN administration.  She was negative for Hep B/C and HIV  Bone marrow was negative.     The patient started  R-CHOP. Ttreatmment  She tolerated the treatment with some minor difficulties. After 3 cycles, she had a repeat CT/PET  There was no activity in the abdomen.  There was development of ground glass opacities/infiltrates in both lungs which were hypermetabolic although the SUV was not reported.  We discussed the imaging studies with Radiology and Pulmonary.  Dr Corral of the Pulmonary Department favored the findings to be due to pulmonary congestion.  Her troponin was  normal, but her BNP was markedly elevated at 1,103.  She was started on Lasix by dr Corral and asked to have a  Ct in few weeks  The patient   had evaluation by Cardiology ( dr Gil).It was felt had developed CHF., with a decrease in her ejection raction to 47%., elevated BNP and imaging studies.  The patient indicated her leg  edema and SOB   improved on lasix. Repeat PET showed  clearance of all the infiltrates  Since, she has had a complete work including cardiac catheterization wirh negative findings.  A repeat CB/PET done  showed   clearance of the previous infiltartes       She comes for follow up. She had a CT/PET in 2018 that shows no evidence of recurrence. Repeat CT/PET 2018 showed also no evidence of recurrence  She has neuropathy of hands, with numbness and some pain.  She is on Neurontin 200 mg tid   ALLERGIES:see med acrd     MEDICATIONS: See MedCard.     PREVIOUS SURGERIES: Appendectomy in , tonsillectomy at age 19, gastric   bypass with incidental cholecystectomy, bilateral knee replacement in .     SOCIAL HISTORY: She is . She had two natural children, and one adopted   one. The adopted child has . She lives in [place] with her   . She smoked for two years, averaging a pack a day. She stopped 35   years ago or so. Denies any alcohol intake. She worked in [employer].     FAMILY HISTORY: Father  of colon cancer. Younger brother had leukemia that  transform into a lymphoma. Sister had pancreatic cancerhis   Review of Systems   Constitutional: Negative.    HENT: Negative.    Eyes: Negative.    Respiratory: Negative.  Negative for cough and wheezing.    Cardiovascular: Negative.  Negative for chest pain.   Gastrointestinal: Negative.    Genitourinary: Negative.    Neurological: Negative.    Psychiatric/Behavioral: Negative.        Objective:      Physical Exam   Constitutional: She is oriented to person, place, and time. She appears well-developed. No distress.   HENT:   Head: Normocephalic.   Right Ear: Tympanic membrane, external ear and ear canal normal.   Left Ear: Tympanic membrane, external ear and ear canal normal.   Nose: Nose normal. Right sinus exhibits no maxillary sinus tenderness and no frontal sinus tenderness. Left sinus exhibits no maxillary sinus tenderness and no frontal sinus tenderness.   Mouth/Throat: Oropharynx  is clear and moist and mucous membranes are normal.   Teeth normal.  Gums normal.   Eyes: Conjunctivae and lids are normal. Pupils are equal, round, and reactive to light.   Neck: Normal carotid pulses, no hepatojugular reflux and no JVD present. Carotid bruit is not present. No tracheal deviation present. No thyroid mass and no thyromegaly present.   Cardiovascular: Normal rate, regular rhythm, S1 normal, S2 normal, normal heart sounds and intact distal pulses.  Exam reveals no gallop and no friction rub.    No murmur heard.  Carotid exam normal   Pulmonary/Chest: Effort normal and breath sounds normal. No accessory muscle usage. No respiratory distress. She has no wheezes. She has no rales. She exhibits no tenderness.   Abdominal: Soft. Normal appearance. She exhibits no distension and no mass. There is no splenomegaly or hepatomegaly. There is no tenderness. There is no rebound and no guarding.   Musculoskeletal: Normal range of motion. She exhibits no edema or tenderness.        Right hand: Normal.        Left hand: Normal.       Lymphadenopathy:     She has no cervical adenopathy.     She has no axillary adenopathy.        Right: No inguinal and no supraclavicular adenopathy present.        Left: No inguinal and no supraclavicular adenopathy present.   Neurological: She is alert and oriented to person, place, and time. She has normal strength. No cranial nerve deficit. Coordination normal.   Skin: Skin is warm and dry. No rash noted. She is not diaphoretic. No cyanosis or erythema. No pallor. Nails show no clubbing.   Psychiatric: She has a normal mood and affect. Her behavior is normal. Judgment and thought content normal.       Assessment:       1. Other iron deficiency anemia      2-hx large cell lymphoma  Plan:       Lab Results   Component Value Date    WBC 6.02 07/19/2018    HGB 10.2 (L) 07/19/2018    HCT 33.7 (L) 07/19/2018     (H) 07/19/2018     07/19/2018     Lab Results   Component Value  Date    CREATININE 1.2 07/19/2018    CREATININE 1.2 07/19/2018     Lab Results   Component Value Date    ALT 75 (H) 07/19/2018    ALT 75 (H) 07/19/2018    AST 83 (H) 07/19/2018    AST 83 (H) 07/19/2018    ALKPHOS 96 07/19/2018    ALKPHOS 96 07/19/2018    BILITOT 0.4 07/19/2018    BILITOT 0.4 07/19/2018     Lab Results   Component Value Date    IRON 85 07/19/2018    TIBC 521 (H) 07/19/2018    FERRITIN 28 07/19/2018       Sat.iron 16%   Lab Results   Component Value Date    [id number] >2000 (H) 07/19/2018     Folate level OK  Hemoglobin   is down with an increased tibc and a low saturated iron.  She will be asked to start ICAR one tablet a day. She was reminded about the possibility of constipation and stools becoming dark.  She will be asked to discuss with GI the possibility of repeat  Upper/lower endoscopies..  We will ask Dr Gil for clearance tos top Plavix for 5 days or so before the procedure.  See me in 3 month with a cbc/cmp/ferritin and tibc  ADDENDUM:  Discussed with Dr Gil . He feels she can stop the Plavix  5 days before proceduers

## 2018-07-30 RX ORDER — GABAPENTIN 100 MG/1
CAPSULE ORAL
Qty: 180 CAPSULE | Refills: 2 | Status: SHIPPED | OUTPATIENT
Start: 2018-07-30 | End: 2018-09-10

## 2018-08-02 DIAGNOSIS — F41.8 SITUATIONAL ANXIETY: ICD-10-CM

## 2018-08-03 RX ORDER — ALPRAZOLAM 0.25 MG/1
TABLET ORAL
Qty: 60 TABLET | Refills: 0 | Status: SHIPPED | OUTPATIENT
Start: 2018-08-03 | End: 2018-09-24 | Stop reason: SDUPTHER

## 2018-08-06 ENCOUNTER — LAB VISIT (OUTPATIENT)
Dept: LAB | Facility: HOSPITAL | Age: 78
End: 2018-08-06
Attending: INTERNAL MEDICINE
Payer: MEDICARE

## 2018-08-06 DIAGNOSIS — E87.20 ACIDOSIS: ICD-10-CM

## 2018-08-06 DIAGNOSIS — E87.5 HYPERKALEMIA: ICD-10-CM

## 2018-08-06 DIAGNOSIS — N17.9 AKI (ACUTE KIDNEY INJURY): ICD-10-CM

## 2018-08-06 DIAGNOSIS — N18.31 CHRONIC KIDNEY DISEASE (CKD) STAGE G3A/A1, MODERATELY DECREASED GLOMERULAR FILTRATION RATE (GFR) BETWEEN 45-59 ML/MIN/1.73 SQUARE METER AND ALBUMINURIA CREATININE RATIO LESS THAN 30 MG/G: ICD-10-CM

## 2018-08-06 DIAGNOSIS — Q63.1 LOBULATED KIDNEY: ICD-10-CM

## 2018-08-06 DIAGNOSIS — N14.0 ANALGESIC NEPHROPATHY: ICD-10-CM

## 2018-08-06 DIAGNOSIS — N27.0 SMALL KIDNEY, UNILATERAL: ICD-10-CM

## 2018-08-06 LAB
ALBUMIN SERPL BCP-MCNC: 3.3 G/DL
ANION GAP SERPL CALC-SCNC: 5 MMOL/L
BASOPHILS # BLD AUTO: 0.02 K/UL
BASOPHILS NFR BLD: 0.3 %
BUN SERPL-MCNC: 22 MG/DL
CALCIUM SERPL-MCNC: 9.1 MG/DL
CHLORIDE SERPL-SCNC: 112 MMOL/L
CO2 SERPL-SCNC: 24 MMOL/L
CREAT SERPL-MCNC: 1.1 MG/DL
DIFFERENTIAL METHOD: ABNORMAL
EOSINOPHIL # BLD AUTO: 0.2 K/UL
EOSINOPHIL NFR BLD: 2.8 %
ERYTHROCYTE [DISTWIDTH] IN BLOOD BY AUTOMATED COUNT: 16.7 %
EST. GFR  (AFRICAN AMERICAN): 56 ML/MIN/1.73 M^2
EST. GFR  (NON AFRICAN AMERICAN): 48.5 ML/MIN/1.73 M^2
GLUCOSE SERPL-MCNC: 83 MG/DL
HCT VFR BLD AUTO: 35 %
HGB BLD-MCNC: 10.4 G/DL
IMM GRANULOCYTES # BLD AUTO: 0.03 K/UL
IMM GRANULOCYTES NFR BLD AUTO: 0.4 %
LYMPHOCYTES # BLD AUTO: 2.9 K/UL
LYMPHOCYTES NFR BLD: 41.5 %
MCH RBC QN AUTO: 31.8 PG
MCHC RBC AUTO-ENTMCNC: 29.7 G/DL
MCV RBC AUTO: 107 FL
MONOCYTES # BLD AUTO: 0.6 K/UL
MONOCYTES NFR BLD: 7.8 %
NEUTROPHILS # BLD AUTO: 3.4 K/UL
NEUTROPHILS NFR BLD: 47.2 %
NRBC BLD-RTO: 0 /100 WBC
PHOSPHATE SERPL-MCNC: 3.7 MG/DL
PLATELET # BLD AUTO: 218 K/UL
PMV BLD AUTO: 10.9 FL
POTASSIUM SERPL-SCNC: 4.7 MMOL/L
PTH-INTACT SERPL-MCNC: 225 PG/ML
RBC # BLD AUTO: 3.27 M/UL
SODIUM SERPL-SCNC: 141 MMOL/L
WBC # BLD AUTO: 7.09 K/UL

## 2018-08-06 PROCEDURE — 80069 RENAL FUNCTION PANEL: CPT

## 2018-08-06 PROCEDURE — 85025 COMPLETE CBC W/AUTO DIFF WBC: CPT

## 2018-08-06 PROCEDURE — 36415 COLL VENOUS BLD VENIPUNCTURE: CPT

## 2018-08-06 PROCEDURE — 83970 ASSAY OF PARATHORMONE: CPT

## 2018-08-13 ENCOUNTER — TELEPHONE (OUTPATIENT)
Dept: NEPHROLOGY | Facility: CLINIC | Age: 78
End: 2018-08-13

## 2018-08-13 NOTE — TELEPHONE ENCOUNTER
----- Message from Zhane Hendrix sent at 8/13/2018  9:59 AM CDT -----  Contact: Pt   Pt returned a phone call..[phone] (home)

## 2018-08-13 NOTE — TELEPHONE ENCOUNTER
S/W pt informed of lab results per Dr Gomez that kidney function has improved since last visit and advised to continue vitamin D therapy and keep upcoming appointment.  Pt verbalized understanding.  VB

## 2018-08-13 NOTE — TELEPHONE ENCOUNTER
Returned call to patient. She is requesting a call to get the results. She missed her appointment today due to car trouble

## 2018-08-13 NOTE — TELEPHONE ENCOUNTER
----- Message from Jonathan Ruiz sent at 8/13/2018  8:42 AM CDT -----  Contact: pt   States she's had to cancel her appt due to car trouble and wants to get her test results over the phone and also have the orders placed back in system to resched her labs & can be reached at [phone]

## 2018-08-13 NOTE — TELEPHONE ENCOUNTER
----- Message from Jorge Clifton sent at 8/13/2018  4:04 PM CDT -----  Contact: pt   Pt is returning the nurse call.                                     [phone]

## 2018-08-17 ENCOUNTER — TELEPHONE (OUTPATIENT)
Dept: GASTROENTEROLOGY | Facility: CLINIC | Age: 78
End: 2018-08-17

## 2018-08-17 NOTE — TELEPHONE ENCOUNTER
Letter sent to patient to inform pateint 9/6 appt is cancelled and needs to be rescheduled Due to Dr. Arthur not being here

## 2018-08-20 ENCOUNTER — OFFICE VISIT (OUTPATIENT)
Dept: RHEUMATOLOGY | Facility: CLINIC | Age: 78
End: 2018-08-20
Payer: MEDICARE

## 2018-08-20 ENCOUNTER — LAB VISIT (OUTPATIENT)
Dept: LAB | Facility: HOSPITAL | Age: 78
End: 2018-08-20
Attending: INTERNAL MEDICINE
Payer: MEDICARE

## 2018-08-20 VITALS
HEIGHT: 64 IN | SYSTOLIC BLOOD PRESSURE: 137 MMHG | BODY MASS INDEX: 28 KG/M2 | WEIGHT: 164 LBS | DIASTOLIC BLOOD PRESSURE: 87 MMHG | HEART RATE: 91 BPM

## 2018-08-20 DIAGNOSIS — M18.9 UNILATERAL OSTEOARTHRITIS OF FIRST CARPOMETACARPAL (CMC) JOINT: ICD-10-CM

## 2018-08-20 DIAGNOSIS — M81.0 AGE-RELATED OSTEOPOROSIS WITHOUT CURRENT PATHOLOGICAL FRACTURE: ICD-10-CM

## 2018-08-20 DIAGNOSIS — M1A.09X0 IDIOPATHIC CHRONIC GOUT OF MULTIPLE SITES WITHOUT TOPHUS: ICD-10-CM

## 2018-08-20 DIAGNOSIS — G89.29 CHRONIC MIDLINE LOW BACK PAIN WITH RIGHT-SIDED SCIATICA: ICD-10-CM

## 2018-08-20 DIAGNOSIS — E55.9 VITAMIN D DEFICIENCY: ICD-10-CM

## 2018-08-20 DIAGNOSIS — M54.41 CHRONIC MIDLINE LOW BACK PAIN WITH RIGHT-SIDED SCIATICA: ICD-10-CM

## 2018-08-20 DIAGNOSIS — E21.3 HYPERPARATHYROIDISM: ICD-10-CM

## 2018-08-20 DIAGNOSIS — M1A.09X0 IDIOPATHIC CHRONIC GOUT OF MULTIPLE SITES WITHOUT TOPHUS: Primary | ICD-10-CM

## 2018-08-20 LAB
25(OH)D3+25(OH)D2 SERPL-MCNC: 19 NG/ML
ALBUMIN SERPL BCP-MCNC: 3.4 G/DL
ALP SERPL-CCNC: 92 U/L
ALT SERPL W/O P-5'-P-CCNC: 71 U/L
ANION GAP SERPL CALC-SCNC: 6 MMOL/L
AST SERPL-CCNC: 66 U/L
BILIRUB SERPL-MCNC: 0.4 MG/DL
BUN SERPL-MCNC: 18 MG/DL
CALCIUM SERPL-MCNC: 9 MG/DL
CHLORIDE SERPL-SCNC: 114 MMOL/L
CO2 SERPL-SCNC: 22 MMOL/L
CREAT SERPL-MCNC: 1.1 MG/DL
EST. GFR  (AFRICAN AMERICAN): 56 ML/MIN/1.73 M^2
EST. GFR  (NON AFRICAN AMERICAN): 49 ML/MIN/1.73 M^2
GLUCOSE SERPL-MCNC: 87 MG/DL
POTASSIUM SERPL-SCNC: 4.8 MMOL/L
PROT SERPL-MCNC: 6.1 G/DL
SODIUM SERPL-SCNC: 142 MMOL/L
URATE SERPL-MCNC: 6.2 MG/DL

## 2018-08-20 PROCEDURE — 84550 ASSAY OF BLOOD/URIC ACID: CPT | Mod: PO

## 2018-08-20 PROCEDURE — 36415 COLL VENOUS BLD VENIPUNCTURE: CPT | Mod: PO

## 2018-08-20 PROCEDURE — 82306 VITAMIN D 25 HYDROXY: CPT

## 2018-08-20 PROCEDURE — 3075F SYST BP GE 130 - 139MM HG: CPT | Mod: CPTII,S$GLB,, | Performed by: INTERNAL MEDICINE

## 2018-08-20 PROCEDURE — 99999 PR PBB SHADOW E&M-EST. PATIENT-LVL III: CPT | Mod: PBBFAC,,, | Performed by: INTERNAL MEDICINE

## 2018-08-20 PROCEDURE — 99215 OFFICE O/P EST HI 40 MIN: CPT | Mod: S$GLB,,, | Performed by: INTERNAL MEDICINE

## 2018-08-20 PROCEDURE — 80053 COMPREHEN METABOLIC PANEL: CPT | Mod: PO

## 2018-08-20 PROCEDURE — 3079F DIAST BP 80-89 MM HG: CPT | Mod: CPTII,S$GLB,, | Performed by: INTERNAL MEDICINE

## 2018-08-20 NOTE — ASSESSMENT & PLAN NOTE
Hyperparathyroidism likely secondary to chronic kidney disease complicated by vitamin-D deficiency.  Management per Dr. Gomez.

## 2018-08-20 NOTE — PROGRESS NOTES
RHEUMATOLOGY CLINIC FOLLOW UP VISIT  Chief complaints:-   Follow-up for arthritis.  My back hurts    HPI:-  Violet Zhao a 78 y.o. pleasant female comes in for a follow up visit with above chief complaints.  She follows up in the Rheumatology Clinic for gout and osteoarthritis.  She complains of intermittent acute low back pain associated with numbness tingling shooting down her right leg all the way up to the toes.  She denies any significant pain over her hands today.  She denies any flare-up of gout since last visit.  She is taking allopurinol without any problem.  She has chronic kidney disease associated with hyperparathyroidism and vitamin-D deficiency.  She is under care of nephrologist Dr. Gomez.      Review of Systems   Constitutional: Negative for chills and fever.   HENT: Negative for congestion and sore throat.    Eyes: Negative for blurred vision and redness.   Respiratory: Negative for cough and shortness of breath.    Cardiovascular: Negative for chest pain and leg swelling.   Gastrointestinal: Negative for abdominal pain.   Genitourinary: Negative for dysuria.   Musculoskeletal: Positive for back pain and joint pain. Negative for falls, myalgias and neck pain.   Skin: Negative for rash.   Neurological: Negative for headaches.   Endo/Heme/Allergies: Does not bruise/bleed easily.   Psychiatric/Behavioral: Negative for memory loss. The patient does not have insomnia.        Past Medical History:   Diagnosis Date    Age-related osteoporosis without current pathological fracture 8/20/2018    Anemia     Anxiety     Arthritis     Cancer     lymphoma Large cell B    Chronic midline low back pain with right-sided sciatica 8/20/2018    Coronary artery disease     01/2015 LHC patent LCX. 50% stenosis in LAD and RCA.      Depression     Disorder of kidney and ureter     Encounter for blood transfusion     GERD (gastroesophageal reflux  "disease)     Gout, arthritis     Heart failure     Hx of psychiatric care     Hyperlipidemia     Hypertension     Hypothyroidism     Immune deficiency disorder     Lung nodule 2014    RML--stable    Obesity     Pacemaker     Metronic    Paroxysmal atrial fibrillation 3/15/2018    Pneumonia     Polyneuropathy     chemo induced    Psychiatric problem     Tobacco dependence     quit     Trouble in sleeping        Past Surgical History:   Procedure Laterality Date    APPENDECTOMY  1966 approx    CARDIAC PACEMAKER PLACEMENT  2015    CHOLECYSTECTOMY  1993    incidental at time of gastric bypass    COLON SURGERY Right 2017    hemicolectomy    CORONARY ANGIOPLASTY  2014    CORONARY STENT PLACEMENT  2014    GASTRIC BYPASS  1993    with incidental choly    HERNIA REPAIR      JOINT REPLACEMENT Bilateral 2009    3 months apart    TONSILLECTOMY  195        Social History     Tobacco Use    Smoking status: Former Smoker     Packs/day: 2.00     Years: 6.00     Pack years: 12.00     Last attempt to quit: 3/15/1976     Years since quittin.4    Smokeless tobacco: Never Used   Substance Use Topics    Alcohol use: No     Alcohol/week: 0.0 oz    Drug use: No       Family History   Problem Relation Age of Onset    Heart disease Mother     Hypertension Mother     Cataracts Mother     Stomach cancer Father         "ulcers that turned to cancer"    Cancer Father         stomach    Pancreatic cancer Sister     Cancer Sister         pancreatic    Leukemia Brother         "leukemia which led to intestinal cancer"    Cataracts Brother     Cancer Brother         leukemia then later stomach cancer    Drug abuse Son     Cancer Son         prostate cancer    Prostate cancer Son     COPD Daughter     Asthma Daughter     Hypertension Maternal Grandfather     Stroke Maternal Grandfather     Alcohol abuse Neg Hx     Diabetes Neg Hx     Mental retardation Neg Hx     Mental " "illness Neg Hx        Review of patient's allergies indicates:   Allergen Reactions    Corticosteroids (glucocorticoids) Nausea Only and Other (See Comments)     Stomach pain, dizziness, headache    Oxycodone Other (See Comments)     Blood pressure dropped           Physical examination:-    Vitals:    08/20/18 0957   BP: 137/87   Pulse: 91   Weight: 74.4 kg (164 lb 0.4 oz)   Height: 5' 4" (1.626 m)   PainSc:   7   PainLoc: Generalized       Physical Exam   Constitutional: She is oriented to person, place, and time and well-developed, well-nourished, and in no distress. No distress.   HENT:   Head: Normocephalic.   Mouth/Throat: Oropharynx is clear and moist.   Eyes: Conjunctivae and EOM are normal. Pupils are equal, round, and reactive to light.   Neck: Normal range of motion. Neck supple.   Cardiovascular: Normal rate and intact distal pulses.   Pulmonary/Chest: Effort normal. No respiratory distress.   Abdominal: Soft. There is no tenderness.   Musculoskeletal:   No synovitis over small joints of hands or feet.  No effusion over large joints.Mild tenderness over lumbar region.    Neurological: She is alert and oriented to person, place, and time. No cranial nerve deficit.   Skin: Skin is warm. No rash noted. No erythema.   Psychiatric: Mood and affect normal.   Nursing note and vitals reviewed.      Medication List with Changes/Refills   Current Medications    ALLOPURINOL (ZYLOPRIM) 300 MG TABLET    Take 1 tablet (300 mg total) by mouth once daily.    ALPRAZOLAM (XANAX) 0.25 MG TABLET    TAKE ONE TABLET BY MOUTH TWICE DAILY AS NEEDED FOR ANXIETY    ASCORBIC ACID, VITAMIN C, (VITAMIN C) 100 MG TABLET    Take by mouth once daily.    ASPIRIN (ECOTRIN) 81 MG EC TABLET    Take 81 mg by mouth nightly.     CLOPIDOGREL (PLAVIX) 75 MG TABLET    TAKE ONE TABLET BY MOUTH EVERY DAY    COLCRYS 0.6 MG TABLET    Take 1 tablet (0.6 mg total) by mouth once daily.    DICLOFENAC SODIUM 1 % GEL    Apply 2 g topically once daily. " Apply 2 g over painful joints once or twice a day.    FLUTICASONE (FLONASE) 50 MCG/ACTUATION NASAL SPRAY    2 sprays by Each Nare route once daily.    FUROSEMIDE (LASIX) 20 MG TABLET    Take 1 tablet (20 mg total) by mouth once daily.    GABAPENTIN (NEURONTIN) 100 MG CAPSULE    TAKE TWO CAPSULES BY MOUTH THREE TIMES DAILY    IRON-VITAMIN C 100-250 MG, ICAR-C, (ICAR-C) 100-250 MG TAB    Take 1 tablet by mouth once daily.    ISOSORBIDE MONONITRATE (IMDUR) 30 MG 24 HR TABLET    Take 1 tablet (30 mg total) by mouth nightly.    LEVOTHYROXINE (SYNTHROID) 75 MCG TABLET    TAKE 1 TABLET(75 MCG) BY MOUTH BEFORE BREAKFAST    LORATADINE (CLARITIN) 10 MG TABLET    Take 1 tablet (10 mg total) by mouth once daily.    LOSARTAN (COZAAR) 25 MG TABLET    Take 1 tablet (25 mg total) by mouth once daily.    MELOXICAM (MOBIC) 7.5 MG TABLET    TAKE ONE TABLET BY MOUTH EVERY DAY AS NEEDED FOR PAIN    METOPROLOL TARTRATE (LOPRESSOR) 25 MG TABLET    TAKE ONE TABLET BY MOUTH TWICE DAILY    MIRTAZAPINE (REMERON SOL-TAB) 15 MG DISINTEGRATING TABLET    DISSOLVE ONE TABLET BY MOUTH NIGHTLY    MULTIVITAMIN (ONE DAILY MULTIVITAMIN) PER TABLET    Take 1 tablet by mouth once daily.    RANITIDINE (ZANTAC) 150 MG CAPSULE    Take 150 mg by mouth nightly.     ROSUVASTATIN (CRESTOR) 10 MG TABLET    TAKE ONE TABLET BY MOUTH EVERY EVENING    SERTRALINE (ZOLOFT) 100 MG TABLET    TAKE 1.5 TABLETS BY MOUTH ONCE DAILY    SODIUM BICARBONATE 650 MG TABLET    Take 1 tablet (650 mg total) by mouth 2 (two) times daily.    ZINC ACETATE ORAL    Take 250 mg by mouth once daily.        Assessment/Plans:-  Idiopathic chronic gout of multiple sites without tophus  Stable.  No flare-up since last visit.  Continue allopurinol 300 mg once daily.  Check uric acid levels today.  Be cautious with colchicine because of her chronic kidney disease.  - Uric acid; Future  - Comprehensive metabolic panel; Standing    Age-related osteoporosis without current pathological  fracture  Osteoporosis without any history of fragility fracture.  Bisphosphonate contraindicated because of chronic kidney disease.  GFR more than 30 with no contraindication for Prolia.  Start Prolia.  Discussed in detail about all adverse effects of the medication including osteonecrosis of the jaw and atypical femoral fractures.  She is at high risk for hypocalcemia within the 1st week after Prolia.  Check calcium before and after Prolia injection.  - Prior Authorization Order  - denosumab (PROLIA) injection 60 mg; Inject 1 mL (60 mg total) into the skin every 6 (six) months.  - Comprehensive metabolic panel; Standing    Hyperparathyroidism  Hyperparathyroidism likely secondary to chronic kidney disease complicated by vitamin-D deficiency.  Management per Dr. Gomez.    Vitamin D deficiency  Check levels before treating with Prolia.  Continue weekly supplementation.  - Vitamin D; Future    Chronic midline low back pain with right-sided sciatica  Chronic lumbago with right-sided sciatica.  Referred to Physical Medicine Rehabilitation.  - Ambulatory referral to Physical Medicine Rehab    Unilateral osteoarthritis of first carpometacarpal (CMC) joint  Stable.  Advised to take as needed acetaminophen.  Avoid NSAIDs because of chronic kidney disease.  Inject joint if needed in future.     # Follow-up in about 6 months (around 2/20/2019).    Disclaimer: This note was prepared using voice recognition system and is likely to have sound alike errors and is not proof read.  Please call me with any questions.

## 2018-08-20 NOTE — ASSESSMENT & PLAN NOTE
Stable.  No flare-up since last visit.  Continue allopurinol 300 mg once daily.  Check uric acid levels today.  Be cautious with colchicine because of her chronic kidney disease.

## 2018-08-20 NOTE — ASSESSMENT & PLAN NOTE
Stable.  Advised to take as needed acetaminophen.  Avoid NSAIDs because of chronic kidney disease.  Inject joint if needed in future.

## 2018-08-20 NOTE — ASSESSMENT & PLAN NOTE
Osteoporosis without any history of fragility fracture.  Bisphosphonate contraindicated because of chronic kidney disease.  GFR more than 30 with no contraindication for Prolia.  Start Prolia.  Discussed in detail about all adverse effects of the medication including osteonecrosis of the jaw and atypical femoral fractures.  She is at high risk for hypocalcemia within the 1st week after Prolia.  Check calcium before and after Prolia injection.

## 2018-08-21 ENCOUNTER — PATIENT MESSAGE (OUTPATIENT)
Dept: RHEUMATOLOGY | Facility: CLINIC | Age: 78
End: 2018-08-21

## 2018-08-21 DIAGNOSIS — E55.9 VITAMIN D DEFICIENCY: Primary | ICD-10-CM

## 2018-08-21 RX ORDER — ERGOCALCIFEROL 1.25 MG/1
50000 CAPSULE ORAL
Qty: 12 CAPSULE | Refills: 3 | Status: SHIPPED | OUTPATIENT
Start: 2018-08-21 | End: 2019-03-06 | Stop reason: SDUPTHER

## 2018-08-22 ENCOUNTER — TELEPHONE (OUTPATIENT)
Dept: RHEUMATOLOGY | Facility: CLINIC | Age: 78
End: 2018-08-22

## 2018-08-22 NOTE — TELEPHONE ENCOUNTER
Spoke with pt and she states that she is taking the ergocalciferol 50,000 units once weekly already- given by Dr. Gomez.

## 2018-08-22 NOTE — TELEPHONE ENCOUNTER
----- Message from Dilshad Rogers MD sent at 8/21/2018  6:01 PM CDT -----  Low vitamin D. Start ergocalciferol weekly.

## 2018-08-22 NOTE — TELEPHONE ENCOUNTER
Please request her to discuss with Dr. Gomez since her vitamin-D is still low.  He might switch her medications.    Dr. STORY

## 2018-08-27 ENCOUNTER — TELEPHONE (OUTPATIENT)
Dept: RHEUMATOLOGY | Facility: CLINIC | Age: 78
End: 2018-08-27

## 2018-08-27 NOTE — TELEPHONE ENCOUNTER
----- Message from Jonathan Ruiz sent at 8/27/2018 11:07 AM CDT -----  Contact: Pt   States she's calling rg wanting to resched her prolia injection and can be reached at [phone]

## 2018-08-29 ENCOUNTER — CLINICAL SUPPORT (OUTPATIENT)
Dept: RHEUMATOLOGY | Facility: CLINIC | Age: 78
End: 2018-08-29
Payer: MEDICARE

## 2018-08-29 PROCEDURE — 96372 THER/PROPH/DIAG INJ SC/IM: CPT | Mod: S$GLB,,, | Performed by: INTERNAL MEDICINE

## 2018-08-29 NOTE — PROGRESS NOTES
Administered 1cc Prolia 60mg/cc to right upper outer of abdomen. Pt tolerated well. No acute reaction noted at site. Pt instructed on S/S of reaction to report. Pt verbalized understanding. Patient waited 15 minutes post injection    Lot:[id number]  Exp.11/20  Manu:Malick    Calcium:9.0  Creatinine:1.1

## 2018-09-10 ENCOUNTER — OFFICE VISIT (OUTPATIENT)
Dept: PHYSICAL MEDICINE AND REHAB | Facility: CLINIC | Age: 78
End: 2018-09-10
Payer: MEDICARE

## 2018-09-10 ENCOUNTER — OFFICE VISIT (OUTPATIENT)
Dept: NEPHROLOGY | Facility: CLINIC | Age: 78
End: 2018-09-10
Payer: MEDICARE

## 2018-09-10 VITALS
HEART RATE: 66 BPM | WEIGHT: 164.44 LBS | BODY MASS INDEX: 28.07 KG/M2 | DIASTOLIC BLOOD PRESSURE: 80 MMHG | HEIGHT: 64 IN | SYSTOLIC BLOOD PRESSURE: 120 MMHG

## 2018-09-10 VITALS
DIASTOLIC BLOOD PRESSURE: 77 MMHG | WEIGHT: 164 LBS | SYSTOLIC BLOOD PRESSURE: 120 MMHG | HEIGHT: 64 IN | HEART RATE: 84 BPM | RESPIRATION RATE: 14 BRPM | BODY MASS INDEX: 28 KG/M2

## 2018-09-10 DIAGNOSIS — N18.31 CHRONIC KIDNEY DISEASE (CKD) STAGE G3A/A1, MODERATELY DECREASED GLOMERULAR FILTRATION RATE (GFR) BETWEEN 45-59 ML/MIN/1.73 SQUARE METER AND ALBUMINURIA CREATININE RATIO LESS THAN 30 MG/G: Primary | ICD-10-CM

## 2018-09-10 DIAGNOSIS — E21.3 HPTH (HYPERPARATHYROIDISM): ICD-10-CM

## 2018-09-10 DIAGNOSIS — M54.16 RIGHT LUMBAR RADICULITIS: Primary | ICD-10-CM

## 2018-09-10 DIAGNOSIS — M46.1 SACROILIITIS: ICD-10-CM

## 2018-09-10 DIAGNOSIS — N14.0 ANALGESIC NEPHROPATHY: ICD-10-CM

## 2018-09-10 DIAGNOSIS — E87.20 ACIDOSIS: ICD-10-CM

## 2018-09-10 DIAGNOSIS — N27.0 SMALL KIDNEY, UNILATERAL: ICD-10-CM

## 2018-09-10 DIAGNOSIS — E87.5 HYPERKALEMIA: ICD-10-CM

## 2018-09-10 DIAGNOSIS — G62.9 NEUROPATHY: ICD-10-CM

## 2018-09-10 DIAGNOSIS — Q63.1 LOBULATED KIDNEY: ICD-10-CM

## 2018-09-10 PROCEDURE — 99499 UNLISTED E&M SERVICE: CPT | Mod: HCNC,S$GLB,, | Performed by: INTERNAL MEDICINE

## 2018-09-10 PROCEDURE — 1101F PT FALLS ASSESS-DOCD LE1/YR: CPT | Mod: CPTII,,, | Performed by: PHYSICAL MEDICINE & REHABILITATION

## 2018-09-10 PROCEDURE — 99499 UNLISTED E&M SERVICE: CPT | Mod: HCNC,S$GLB,, | Performed by: PHYSICAL MEDICINE & REHABILITATION

## 2018-09-10 PROCEDURE — 3074F SYST BP LT 130 MM HG: CPT | Mod: CPTII,,, | Performed by: INTERNAL MEDICINE

## 2018-09-10 PROCEDURE — 99213 OFFICE O/P EST LOW 20 MIN: CPT | Mod: PBBFAC | Performed by: INTERNAL MEDICINE

## 2018-09-10 PROCEDURE — 1101F PT FALLS ASSESS-DOCD LE1/YR: CPT | Mod: CPTII,,, | Performed by: INTERNAL MEDICINE

## 2018-09-10 PROCEDURE — 99999 PR PBB SHADOW E&M-EST. PATIENT-LVL III: CPT | Mod: PBBFAC,,, | Performed by: INTERNAL MEDICINE

## 2018-09-10 PROCEDURE — 99214 OFFICE O/P EST MOD 30 MIN: CPT | Mod: S$PBB,,, | Performed by: PHYSICAL MEDICINE & REHABILITATION

## 2018-09-10 PROCEDURE — 3078F DIAST BP <80 MM HG: CPT | Mod: CPTII,,, | Performed by: PHYSICAL MEDICINE & REHABILITATION

## 2018-09-10 PROCEDURE — 3079F DIAST BP 80-89 MM HG: CPT | Mod: CPTII,,, | Performed by: INTERNAL MEDICINE

## 2018-09-10 PROCEDURE — 3074F SYST BP LT 130 MM HG: CPT | Mod: CPTII,,, | Performed by: PHYSICAL MEDICINE & REHABILITATION

## 2018-09-10 PROCEDURE — 99999 PR PBB SHADOW E&M-EST. PATIENT-LVL III: CPT | Mod: PBBFAC,,, | Performed by: PHYSICAL MEDICINE & REHABILITATION

## 2018-09-10 PROCEDURE — 99213 OFFICE O/P EST LOW 20 MIN: CPT | Mod: PBBFAC,27,PO | Performed by: PHYSICAL MEDICINE & REHABILITATION

## 2018-09-10 PROCEDURE — 99214 OFFICE O/P EST MOD 30 MIN: CPT | Mod: S$PBB,,, | Performed by: INTERNAL MEDICINE

## 2018-09-10 RX ORDER — CALCITRIOL 0.25 UG/1
0.25 CAPSULE ORAL
Qty: 12 CAPSULE | Refills: 5 | Status: SHIPPED | OUTPATIENT
Start: 2018-09-10 | End: 2019-01-29 | Stop reason: SDUPTHER

## 2018-09-10 RX ORDER — GABAPENTIN 300 MG/1
300 CAPSULE ORAL 3 TIMES DAILY
Qty: 90 CAPSULE | Refills: 1 | Status: SHIPPED | OUTPATIENT
Start: 2018-09-10 | End: 2019-01-24

## 2018-09-10 NOTE — LETTER
September 10, 2018      Dilshad Rogers MD  [address] [place] [address]           [facility] - Physiatry  [address] [place] [address]  Phone: [phone]  Fax: [phone]          Patient: Violet Swenson   MR Number: [id number]   YOB: 1940   Date of Visit: 9/10/2018       Dear Dr. Dilshad Rogers:    Thank you for referring Violet Swenson to me for evaluation. Attached you will find relevant portions of my assessment and plan of care.    If you have questions, please do not hesitate to call me. I look forward to following Violet Swenson along with you.    Sincerely,    Lorraine Rizo MD    Enclosure  CC:  No Recipients    If you would like to receive this communication electronically, please contact [email or web address] or [phone] to request more information on [facility] access.    For providers and/or their staff who would like to refer a patient to [facility], please contact us through our one-stop-shop provider referral line, [facility], at [phone].    If you feel you have received this communication in error or would no longer like to receive these types of communications, please e-mail [email or web address]

## 2018-09-10 NOTE — PATIENT INSTRUCTIONS
Exercises to Strengthen Your Lower Back  Strong lower back and abdominal muscles work together to support your spine. The exercises below will help strengthen the lower back. It is important that you begin exercising slowly and increase levels gradually.  Always begin any exercise program with stretching. If you feel pain while doing any of these exercises, stop and talk to your doctor about a more specific exercise program that better suits your condition.   Low back stretch  The point of stretching is to make you more flexible and increase your range of motion. Stretch only as much as you are able. Stretch slowly. Do not push your stretch to the limit. If at any point you feel pain while stretching, this is your (temporary) limit.  · Lie on your back with your knees bent and both feet on the ground.  · Slowly raise your left knee to your chest as you flatten your lower back against the floor. Hold for 5 seconds.  · Relax and repeat the exercise with your right knee.  · Do 10 of these exercises for each leg.  · Repeat hugging both knees to your chest at the same time.  Building lower back strength  Start your exercise routine with 10 to 30 minutes a day, 1 to 3 times a day.  Initial exercises  Lying on your back:  1. Ankle pumps: Move your foot up and down, towards your head, and then away. Repeat 10 times with each foot.  2. Heel slides: Slowly bend your knee, drawing the heel of your foot towards you. Then slide your heel/foot from you, straightening your knee. Do not lift your foot off the floor (this is not a leg lift).  3. Abdominal contraction: Bend your knees and put your hands on your stomach. Tighten your stomach muscles. Hold for 5 seconds, then relax. Repeat 10 times.  4. Straight leg raise: Bend one leg at the knee and keep the other leg straight. Tighten your stomach muscles. Slowly lift your straight leg 6 to 12 inches off the floor and hold for up to 5 seconds. Repeat 10 times on each  side.  Standin. Wall squats: Stand with your back against the wall. Move your feet about 12 inches away from the wall. Tighten your stomach muscles, and slowly bend your knees until they are at about a 45 degree angle. Do not go down too far. Hold about 5 seconds. Then slowly return to your starting position. Repeat 10 times.  2. Heel raises: Stand facing the wall. Slowly raise the heels of your feet up and down, while keeping your toes on the floor. If you have trouble balancing, you can touch the wall with your hands. Repeat 10 times.  More advanced exercises  When you feel comfortable enough, try these exercises.  1. Kneeling lumbar extension: Begin on your hands and knees. At the same time, raise and straighten your right arm and left leg until they are parallel to the ground. Hold for 2 seconds and come back slowly to a starting position. Repeat with left arm and right leg, alternating 10 times.  2. Prone lumbar extension: Lie face down, arms extended overhead, palms on the floor. At the same time, raise your right arm and left leg as high as comfortably possible. Hold for 10 seconds and slowly return to start. Repeat with left arm and right leg, alternating 10 times. Gradually build up to 20 times. (Advanced: Repeat this exercise raising both arms and both legs a few inches off the floor at the same time. Hold for 5 seconds and release.)  3. Pelvic tilt: Lie on the floor on your back with your knees bent at 90 degrees. Your feet should be flat on the floor. Inhale, exhale, then slowly contract your abdominal muscles bringing your navel toward your spine. Let your pelvis rock back until your lower back is flat on the floor. Hold for 10 seconds while breathing smoothly.  4. Abdominal crunch: Perform a pelvic tilt (above) flattening your lower back against the floor. Holding the tension in your abdominal muscles, take another breath and raise your shoulder blades off the ground (this is not a full sit-up).  Keep your head in line with your body (dont bend your neck forward). Hold for 2 seconds, then slowly lower.  Date Last Reviewed: 6/1/2016  © 5650-9587 [employer]. [address] [place] [address]. All rights reserved. This information is not intended as a substitute for professional medical care. Always follow your healthcare professional's instructions.        Possible Causes of Low Back or Leg Pain    The symptoms in your back or leg may be due to pressure on a nerve. This pressure may be caused by a damaged disk or by abnormal bone growth. Either way, you may feel pain, burning, tingling, or numbness. If you have pressure on a nerve that connects to the sciatic nerve, pain may shoot down your leg.    Pressure from the disk  Constant wear and tear can weaken a disk over time and cause back pain. The disk can then be damaged by a sudden movement or injury. If its soft center begins to bulge, the disk may press on a nerve. Or the outside of the disk may tear, and the soft center may squeeze through and pinch a nerve.    Pressure from bone  As a disk wears out, the vertebrae right above and below the disk begin to touch. This can put pressure on a nerve. Often, abnormal bone (called bone spurs) grows where the vertebrae rub against each other. This can cause the foramen or the spinal canal to narrow (called stenosis) and press against a nerve.  Date Last Reviewed: 10/4/2015  © 5878-4099 [employer]. [address] [place] [address]. All rights reserved. This information is not intended as a substitute for professional medical care. Always follow your healthcare professional's instructions.        Relieving Back Pain  Back pain is a common problem. You can strain back muscles by lifting too much weight or just by moving the wrong way. Back strain can be uncomfortable, even painful. And it can take weeks or months to improve. To help yourself feel better and prevent  future back strains, try these tips.  Important Note: Do not give aspirin to children or teens without first discussing it with your healthcare provider.      ? Ice    Ice reduces muscle pain and swelling. It helps most during the first 24 to 48 hours after an injury.  · Wrap an ice pack or a bag of frozen peas in a thin towel. (Never place ice directly on your skin.)  · Place the ice where your back hurts the most.  · Dont ice for more than 20 minutes at a time.  · You can use ice several times a day.  ? Medicines  Over-the-counter pain relievers can include acetaminophen and anti-inflammatory medicines, which includes aspirin or ibuprofen. They can help ease discomfort. Some also reduce swelling.  · Tell your healthcare provider about any medicines you are already taking.  · Take medicines only as directed.  ? Heat  After the first 48 hours, heat can relax sore muscles and improve blood flow.  · Try a warm bath or shower. Or use a heating pad set on low. To prevent a burn, keep a cloth between you and the heating pad.  · Dont use a heating pad for more than 15 minutes at a time. Never sleep on a heating pad.  Date Last Reviewed: 9/1/2015  © 5202-2761 "[employer]. [address] [place] [address], [place] [address]. All rights reserved. This information is not intended as a substitute for professional medical care. Always follow your healthcare professional's instructions.        Back Safety: Poor Posture Hurts  An unhealthy spine often starts with bad habits. Poor movement patterns and posture problems are common causes of back pain. Disk, bone, nerve, and soft tissue problems can all be affected by poor posture. They can lead to pain, stiffness, and other symptoms.    Poor posture backfires  Poor posture can cause pain. Too much slouching puts increased pressure on the disks. An excessive lumbar curve can overload and inflame the vertebrae. As a result, the back muscles may tighten or spasm to splint and  protect the spine. This adds to the pain you feel.    Proper posture: The key to safe movement  Your spine bears your weight throughout the day. This is true whether youre sleeping, standing, or bending. Certain positions strain your spine more than others. But by maintaining proper posture in all positions, you can reduce the stress on your spine.       To improve your standing posture, follow these steps:  · Breathe deeply.  · Relax your shoulders, hips, and ankles. · Think of the ears, shoulders, hips, and ankles as a series of dots. Now, adjust your body to connect the dots in a straight line.  · Tuck your buttocks in just a bit if you need to.      Date Last Reviewed: 10/28/2015  © 8078-4804 [employer]. [address] [place] [address], [place] [address]. All rights reserved. This information is not intended as a substitute for professional medical care. Always follow your healthcare professional's instructions.        Relieving Tension in Your Back  Being relaxed helps keep your mind healthy and your back ready to move. Take short breaks often. Walk around. Stretch. Switch tasks. Also give the following a try.  Make time to relax. Start by setting aside 5 minutes daily.   Deep breathing    Deep breathing is a simple way to reduce stress. You can do it almost any time you need to relax.  · Inhale slowly through your nose. Let your lungs and stomach expand.  · Hold your breath for 2 to 3 seconds.  · Exhale slowly through your mouth until your lungs feel empty. Repeat 3 to 4 times.  Relieve tension  Muscle tension can create tender spots called trigger points. The tips below may help relieve muscle tension.  · Press the trigger point if you can reach it. If not, lie on a soft tennis ball, or ask a friend to press the spot. Use steady pressure for 10 to 15 seconds. Breathe deeply. Repeat a few times.  · Massage trigger points with ice for 2 to 5 minutes. Press lightly at first. Slowly increase  firmness.  Date Last Reviewed: 10/18/2015  © 3403-4138 [employer]. [address] [place] [address]. All rights reserved. This information is not intended as a substitute for professional medical care. Always follow your healthcare professional's instructions.        Caring for Your Back Throughout the Day  Take care of your back throughout the day. You will likely have fewer back problems if you do. Try to warm up before you move. Shift positions often. Also do your best to form healthy habits.    Warm up for the day  Do a few slow, catlike stretches before starting your day. This simple warmup can soften your disks, stretch your back muscles, and help prevent injuries.  Shift positions often  At work and at home, change positions often. This helps keep your body from getting stiff. Stand up or lean back while you sit. If you can, get up and move every 1/2 hour.  Form healthy habits  Here are some suggestions:   · Keep a healthy weight. When you weigh too much, your back is under excess strain. But losing just a few extra pounds can help a lot.  · Try not to overeat. Learn about serving sizes. The size of a serving depends on the food and the food group. Many foods list serving sizes on the labels.  · Handle minor aches with cold and heat. Apply cold the first 24 to 48 hours. Use heat after that. Always place a thin cloth between your skin and the source of cold or heat.  · Take medicines as directed. This helps keep pain under control. Always read labels, and call your healthcare provider or pharmacist if you have any questions.  Walk each day  A daily walk keeps your back and thigh muscles stretched and strong. This gives your back better support. Be sure to walk with your spines three curves aligned, by keeping your head, hips, and toes connected by a vertical line.   Date Last Reviewed: 10/18/2015  © 9852-0552 [employer]. [address] [place] [address]. All  rights reserved. This information is not intended as a substitute for professional medical care. Always follow your healthcare professional's instructions.        Fall Prevention  Falls often occur due to slipping, tripping or losing your balance. Millions of people fall every year and injure themselves. Here are ways to reduce your risk of falling again.  · Think about your fall, was there anything that caused your fall that can be fixed, removed, or replaced?  · Make your home safe by keeping walkways clear of objects you may trip over.  · Use non-slip pads under rugs. Do not use area rugs or small throw rugs.  · Use non-slip mats in bathtubs and showers.  · Install handrails and lights on staircases.  · Do not walk in poorly lit areas.  · Do not stand on chairs or wobbly ladders.  · Use caution when reaching overhead or looking upward. This position can cause a loss of balance.  · Be sure your shoes fit properly, have non-slip bottoms and are in good condition.   · Wear shoes both inside and out. Avoid going barefoot or wearing slippers.  · Be cautious when going up and down stairs, curbs, and when walking on uneven sidewalks.  · If your balance is poor, consider using a cane or walker.  · If your fall was related to alcohol use, stop or limit alcohol intake.   · If your fall was related to use of sleeping medicines, talk to your doctor about this. You may need to reduce your dosage at bedtime if you awaken during the night to go to the bathroom.    · To reduce the need for nighttime bathroom trips:  ¨ Avoid drinking fluids for several hours before going to bed  ¨ Empty your bladder before going to bed  ¨ Men can keep a urinal at the bedside  · Stay as active as you can. Balance, flexibility, strength, and endurance all come from exercise. They all play a role in preventing falls. Ask your healthcare provider which types of activity are right for you.  · Get your vision checked on a regular basis.  · If you have  "pets, know where they are before you stand up or walk so you don't trip over them.  · Use night lights.  Date Last Reviewed: 11/5/2015  © 4342-6474 [employer]. [address] [place] [address], [place] [address]. All rights reserved. This information is not intended as a substitute for professional medical care. Always follow your healthcare professional's instructions.        Exercises to Prevent Falls  Certain types of exercises may help make you less likely to fall. Try the ones below. Or do other exercises that your health care provider suggests. Depending on your health, you may need to start slowly. Don't let that stop you. Even small amounts of exercise can help you. Be sure to talk to your health care provider before starting any exercise program.       Improve balance  Many types of exercise can help improve balance. Shiraz chi and yoga are good examples. Here's another one to try. You can do it anytime and almost anywhere.  · Stand next to a counter or solid support.  · Push yourself up onto your tiptoes.  · Hold for 5 seconds. If you start to lose your balance, hold on to the counter.  · Rest and repeat 5 times. Work up to holding for 20 to 30 seconds, if you can. Increase flexibility  Being more flexible makes it easier for you to move around safely. Try exercises like the seated hamstring stretch.  · Sit in a chair and put one foot on a stool.  · Straighten your leg and reach with both hands down either side of your leg. Reach as far down your leg as you can.  · Hold for about 20 seconds.  · Go back to the starting position. Then repeat 5 times. Switch legs. Build strength  "Resistance" exercises help build strength. You can do them without equipment. Or you can use weights, elastic bands, or special machines. One such exercise is called the biceps curl. You can hold a 1-pound weight or even a can of soup. Do this exercise at least 3 times a week. Strive for every day.  · Sit up straight in a " chair.  · Keep your elbow close to your body and your wrist straight.  · Bend your arm, moving your hand up to your shoulder. Then slowly lower your arm.  · Repeat 5 times. Switch to the other arm.   Build your staying power  Aerobic exercises make your heart and lungs stronger so you can keep moving longer. Walking and swimming are two of the best types of exercises you can do. Using a stationary bike is great, too. Find an aerobic exercise that you enjoy. Start slowly and build up. Even 5 minutes is helpful. Aim for a goal of 30 minutes, at least 3 times a week. You don't have to do 30 minutes in 1 session. Break it up and walk a little throughout the day.  More helpful tips  · Start easy. Slowly work up to doing more.  · Talk with your health care provider about the best exercises for you.  · Call senior centers or health clubs about exercise programs.  · If needed, have a family member watch you walk every so often to check your stability.  · Exercise with a friend. Choose an activity you both enjoy.  · Consider dari chi or yoga to strengthen your balance.  · Try exercises that you can do anytime, anywhere. Here are 2 examples. Have someone with you when you first try these:  ¨ Practice walking by placing 1 foot right in front of the other.  ¨ Stand up and sit down 10 times. Repeat this throughout the day.   Date Last Reviewed: 6/13/2015  © 1187-6748 [employer]. [address] [place] [address]. All rights reserved. This information is not intended as a substitute for professional medical care. Always follow your healthcare professional's instructions.        Preventing Falls: Making Changes in Your Living Space  Is your living space filled with hazards that could cause you to fall? Changes can make you safer. They could even save your life. Take a careful look around your home. Change what you can on your own. Hire someone or ask friends or family to help with harder tasks.      Be sure  to add a nonslip mat to the inside of your shower or bathtub. Always keep a nightlight on. Keep a clear path from your bed to the bathroom. Move items from higher shelves to lower ones.   Remove hazards  · Remove things that can trip you, like throw rugs, boxes, piles of paper, or cords.  · Nail down rugs or carpeting if you don't want to remove them.  · Don't store items on stairs.  · Keep walkways clear.  · Clean up spills right away.  · Replace glass tables with wooden ones. They're safer if you fall.  Add safety devices  · Add handrails to both sides of stairs.  · Buy a raised toilet seat.  · Add grab bars near the toilet and in the shower.  · Get grabbers to help you reach things and avoid climbing.  Improve lighting  · Add nightlights to halls, bedrooms, and bathrooms.   · Put light switches at the top and bottom of stairs.  · Be sure each room and flight of stairs has proper lighting.  · Use shades or curtains to cut glare from windows.  · Put flashlights in each room. Replace burned-out bulbs.  · Get glowing light switches for room entrances.  Take other precautions  · Use nonskid floor wax.  · Buy a nonslip mat and a liquid soap dispenser for the shower.  · Tack down carpets or use slip-resistant backing.  · Put most-used items within easy reach.  · Add bright paint or tape on the top front edge of steps.  · Save big jobs, such as moving furniture or other heavy objects, for family or friends.  · Get professional help installing grab bars. They can be unsafe if not installed the right way.  Fix riskier rooms first  Don't tackle everything at once. Focus on one room at a time. The bathroom is a common spot for falls, so you may start there. Or start with a room you spend lots of time in, such as your bedroom. Make only a few changes at once. This will give you time to adjust to them.     Outside your home  You might arrange for these changes yourself, or you might need to talk to your  or  homeowners' association about them.  · Have loose boards on porches or damaged stairs repaired.  · Have rough edges, holes, or large cracks in sidewalks or driveways repaired.  · Have hazards that could trip you, such as hoses or aliza, removed.  · Use high-wattage light bulbs (100 or greater) near outside doors and stairs.  · Get handrails added to outside stairs. Have them extend beyond the bottom step.  · Get help in winter weather with ice or snow removal.   Date Last Reviewed: 6/12/2015  © 0793-7386 [employer]. [address] [place] [address], [place] [address]. All rights reserved. This information is not intended as a substitute for professional medical care. Always follow your healthcare professional's instructions.

## 2018-09-10 NOTE — PROGRESS NOTES
PM&R CLINIC NOTE    Chief Complaint   Patient presents with    Muscle Pain       HPI: This is a 78 y.o.  female being seen in clinic today for increased pain in her right low back pain with sharp, stabbing pain and pain into her leg.  Her symptoms have persisted over the past 2 months. In the morning, she is stiff.  Moving and changing position provides some relief.     History obtained from patient    Review of Systems:     General- denies lethargy, weight change, fever, chills  Head/neck- denies swallowing difficulties  ENT- denies hearing changes  Cardiovascular-denies chest pain  Pulmonary- denies shortness of breath  GI- denies constipation or bowel incontinence  - denies bladder incontinence  Skin- denies wounds or rashes  Musculoskeletal- denies weakness, +/-pain  Neurologic- denies numbness and tingling  Psychiatric- denies depressive or psychotic features, denies anxiety  Lymphatic-denies swelling  Endocrine- denies hypoglycemic symptoms/DM history    Physical Examination:  General: Well developed, well nourished female, NAD  HEENT: NCAT EOMI  Pulmonary: Normal respirations    Spinal Examination: CERVICAL  Active ROM is within normal limits.  Inspection: No deformity of spinal alignment.  Palpation: No vertebral tenderness to percussion.  Not tender at trapezius and rhomboids, mild tightness    Spinal Examination: LUMBAR or THORACIC  Active ROM is within normal limits.  Inspection: No deformity of spinal alignment.    ttp at si joints  slr neg bilaterally    Bilateral Upper and Lower Extremities:  Shoulder/Elbow/Wrist/Hand ROM wnl   Hip/Knee/Ankle ROM wnl  Bilateral Extremities show normal capillary refill.  No signs of cyanosis, rubor, edema, skin changes, or dysvascular changes of appendages.  Nails appear intact.    Neurological Exam:  Cranial Nerves:  II-XII grossly intact    Manual Muscle Testing: (Motor 5=normal)  5/5 strength bilateral lower exts except 4/5 hip add/abd, 4+/5 hip flex    No focal  atrophy is noted of either upper or lower extremity.  Gait: rollator walker for assistance, limitation of knee and hip ROM      IMPRESSION/PLAN: This is a 78 y.o.  female with sacroiliitis, right leg radiculitis, slight impaired gait/balance    1. Rx for PT-[facility]--Core and hip strengthening, stretch, ROM, modalities, dec pain, gait and balance.  2. Tylenol arthritis prn, cont gabapentin but increase 300mg TID or 300mg in am and 600mg QHS  3. Cont topical agents-tiger balm, etc. Ice/heat modalities  4. Handouts on back care, exercise, etc provided    Lorraine Rizo M.D.  Physical Medicine and Rehab

## 2018-09-10 NOTE — PROGRESS NOTES
Subjective:       Patient ID: Violet Swenson is a 78 y.o. White female who presents for new evaluation of Chronic Kidney Disease and Hypertension    Hypertension   Pertinent negatives include no chest pain, headaches, palpitations or shortness of breath.        Patient is a 78-year-old female with history of hypertension and lymphoma.  Patient had chemotherapy 2017 resulting in congestive heart failure and cardiomyopathy.  Patient has had rise in LFTs.patient had been taking Motrin which 15 mg but was seen by Dr. PANIAGUA in rheumatology who reduced the Mobic down to 7.5 mg.  Patient has been seen by Dr. Morales in hepatology for increased LFTs.    February 2018 patient evaluated for rising creatinine.  Workup ordered.  Noted left sided atrophic kidney and lobulated kidney on the right side which is enlarged based on the CT scan from 2017 09/2018 s/p falls x 3 in last few weeks ; renal function stable       Review of Systems   Constitutional: Negative for activity change, appetite change, chills, diaphoresis, fatigue, fever and unexpected weight change.   HENT: Negative for congestion, dental problem, drooling, postnasal drip, rhinorrhea and voice change.    Eyes: Negative for discharge.   Respiratory: Negative for apnea, cough, choking, chest tightness, shortness of breath, wheezing and stridor.    Cardiovascular: Negative for chest pain, palpitations and leg swelling.   Gastrointestinal: Negative for abdominal distention, blood in stool, constipation, diarrhea, nausea, rectal pain and vomiting.   Endocrine: Negative for cold intolerance, heat intolerance, polydipsia and polyuria.   Genitourinary: Negative for decreased urine volume, difficulty urinating, dysuria, enuresis, flank pain, frequency, hematuria and urgency.   Musculoskeletal: Positive for arthralgias, joint swelling and myalgias. Negative for back pain and gait problem.   Skin: Negative for rash.   Allergic/Immunologic: Negative for food allergies  "and immunocompromised state.   Neurological: Negative for dizziness, tremors, syncope, numbness and headaches.   Hematological: Does not bruise/bleed easily.   Psychiatric/Behavioral: Negative for agitation, behavioral problems and self-injury. The patient is not nervous/anxious and is not hyperactive.    All other systems reviewed and are negative.      Objective:   /80   Pulse 66   Ht 5' 4" (1.626 m)   Wt 74.6 kg (164 lb 7.4 oz)   BMI 28.23 kg/m²      Physical Exam   Constitutional: She is oriented to person, place, and time. No distress.   HENT:   Head: Normocephalic and atraumatic.   Nose: Nose normal.   Eyes: Conjunctivae and EOM are normal. Pupils are equal, round, and reactive to light.   Neck: Normal range of motion. No JVD present. No tracheal deviation present. No thyromegaly present.   Cardiovascular: Normal rate, regular rhythm, normal heart sounds and intact distal pulses. Exam reveals no gallop and no friction rub.   No murmur heard.  Pulmonary/Chest: Effort normal and breath sounds normal. No respiratory distress. She has no wheezes. She has no rales. She exhibits no tenderness.   Abdominal: Soft. Bowel sounds are normal. She exhibits no distension and no mass. There is no tenderness. No hernia.   Musculoskeletal: Normal range of motion. She exhibits no edema, tenderness or deformity.   Right sided sciatica    Neurological: She is alert and oriented to person, place, and time. She has normal reflexes. She displays normal reflexes. No cranial nerve deficit. She exhibits normal muscle tone. Coordination normal.   Skin: Skin is warm. She is not diaphoretic. No erythema. There is pallor.   Psychiatric: She has a normal mood and affect. Her behavior is normal. Judgment and thought content normal.   Nursing note and vitals reviewed.        Lab Results   Component Value Date    CREATININE 1.1 08/20/2018    BUN 18 08/20/2018     08/20/2018    K 4.8 08/20/2018     (H) 08/20/2018    CO2 22 " (L) 08/20/2018     Lab Results   Component Value Date    WBC 7.09 08/06/2018    HGB 10.4 (L) 08/06/2018    HCT 35.0 (L) 08/06/2018     (H) 08/06/2018     08/06/2018     Lab Results   Component Value Date    .0 (H) 08/06/2018    CALCIUM 9.0 08/20/2018    PHOS 3.7 08/06/2018        Assessment:    )    1. Chronic kidney disease (CKD) stage G3a/A1, moderately decreased glomerular filtration rate (GFR) between 45-59 mL/min/1.73 square meter and albuminuria creatinine ratio less than 30 mg/g    2. Small kidney, unilateral    3. Lobulated kidney    4. Analgesic nephropathy    5. Acidosis    6. Hyperkalemia        Plan:        1. Chronic kidney disease stage III: NATHALIE is better ; GFR 33%     2.  Small size kidneys/atrophic kidney on the left side:no renal artery stenosis     3. Vit D def:+ sec HPT: on weekly Vit D + add calcitriol 0.25 mcg MWF     4. CKD-Stage III: 33 % no proteinuria ; watch on mobic    5. Hyperkalemia: due to acidosis; cozaar; mobic and CKD stageIII: will sodium bicarbonate; low K + diet list provided             follow-up 6 months

## 2018-09-20 ENCOUNTER — INITIAL CONSULT (OUTPATIENT)
Dept: GASTROENTEROLOGY | Facility: CLINIC | Age: 78
End: 2018-09-20
Payer: MEDICARE

## 2018-09-20 VITALS
HEART RATE: 74 BPM | HEIGHT: 64 IN | SYSTOLIC BLOOD PRESSURE: 128 MMHG | DIASTOLIC BLOOD PRESSURE: 80 MMHG | WEIGHT: 167.13 LBS | BODY MASS INDEX: 28.53 KG/M2

## 2018-09-20 DIAGNOSIS — D50.0 IRON DEFICIENCY ANEMIA DUE TO CHRONIC BLOOD LOSS: Primary | ICD-10-CM

## 2018-09-20 PROCEDURE — 3074F SYST BP LT 130 MM HG: CPT | Mod: CPTII,,, | Performed by: INTERNAL MEDICINE

## 2018-09-20 PROCEDURE — 3079F DIAST BP 80-89 MM HG: CPT | Mod: CPTII,,, | Performed by: INTERNAL MEDICINE

## 2018-09-20 PROCEDURE — 99213 OFFICE O/P EST LOW 20 MIN: CPT | Mod: PBBFAC | Performed by: INTERNAL MEDICINE

## 2018-09-20 PROCEDURE — 1101F PT FALLS ASSESS-DOCD LE1/YR: CPT | Mod: CPTII,,, | Performed by: INTERNAL MEDICINE

## 2018-09-20 PROCEDURE — 99213 OFFICE O/P EST LOW 20 MIN: CPT | Mod: S$PBB,,, | Performed by: INTERNAL MEDICINE

## 2018-09-20 PROCEDURE — 99999 PR PBB SHADOW E&M-EST. PATIENT-LVL III: CPT | Mod: PBBFAC,,, | Performed by: INTERNAL MEDICINE

## 2018-09-20 RX ORDER — SODIUM, POTASSIUM,MAG SULFATES 17.5-3.13G
SOLUTION, RECONSTITUTED, ORAL ORAL
Qty: 254 ML | Refills: 0 | Status: ON HOLD | OUTPATIENT
Start: 2018-09-20 | End: 2018-11-28 | Stop reason: HOSPADM

## 2018-09-20 NOTE — PROGRESS NOTES
Subjective:       Patient ID: Violet Swenson is a 78 y.o. female.    Chief Complaint: Anemia    Patient with history of Large cell lymphoma  Is now here to discuss re-evaluation because of iron deficiency anemia. She is status post resection of the lesion and she has had a gastric bypass which brings up a consideration of poor iron absorption in addition to blood loss. She denies BRBPR or melena. There is no anorexia or weight loss. She has some foods intolerances due to her Bypass. She has FH of Gastric Cancer and Pancreatic cancer.       Review of Systems   Constitutional: Positive for unexpected weight change. Negative for activity change, appetite change, chills, diaphoresis, fatigue and fever.        Weight now stable   HENT: Negative for congestion, ear discharge, ear pain, hearing loss, nosebleeds, postnasal drip and tinnitus.    Eyes: Negative for photophobia and visual disturbance.   Respiratory: Negative for apnea, cough, choking, chest tightness, shortness of breath and wheezing.    Cardiovascular: Negative for chest pain, palpitations and leg swelling.        Exercise tolerance decreased   Gastrointestinal: Negative for abdominal distention, abdominal pain, anal bleeding, blood in stool, constipation, diarrhea, nausea, rectal pain and vomiting.   Genitourinary: Negative for difficulty urinating, dyspareunia, dysuria, flank pain, frequency, hematuria, menstrual problem, pelvic pain, urgency, vaginal bleeding and vaginal discharge.   Musculoskeletal: Positive for back pain. Negative for arthralgias, gait problem, joint swelling, myalgias and neck stiffness.        Joint stiffness  Lower ext cramping   Skin: Negative for pallor and rash.   Neurological: Positive for headaches. Negative for dizziness, tremors, seizures, syncope, speech difficulty, weakness and numbness.   Hematological: Negative for adenopathy.   Psychiatric/Behavioral: Negative for agitation, confusion, hallucinations, sleep  disturbance and suicidal ideas.       Objective:      Physical Exam   Constitutional: She is oriented to person, place, and time. She appears well-developed and well-nourished.   HENT:   Head: Normocephalic and atraumatic.   Bilateral turbinate congestion   Eyes: Conjunctivae and EOM are normal. Pupils are equal, round, and reactive to light. Right eye exhibits no discharge. Left eye exhibits no discharge. No scleral icterus.   Neck: Normal range of motion. Neck supple. No JVD present. No thyromegaly present.   Cardiovascular: Normal rate, regular rhythm, normal heart sounds and intact distal pulses. Exam reveals no gallop and no friction rub.   No murmur heard.  Pulmonary/Chest: Effort normal and breath sounds normal. No respiratory distress. She has no wheezes. She has no rales. She exhibits no tenderness.   Abdominal: Soft. Bowel sounds are normal. She exhibits no distension and no mass. There is tenderness. There is no rebound and no guarding.   Epigastric area and periumbilical to RUQ tender   Musculoskeletal: Normal range of motion. She exhibits no edema.   Lymphadenopathy:     She has no cervical adenopathy.   Neurological: She is alert and oriented to person, place, and time. She has normal reflexes. She exhibits normal muscle tone. Coordination normal.   Skin: Skin is warm and dry. No rash noted. No erythema. No pallor.   Psychiatric: She has a normal mood and affect. Her behavior is normal. Judgment and thought content normal.       Assessment:   Iron Deficiency Anemia  Large Cell Lymphoma          S/P resection  FH of Gastric Cancer  FH of Pancreatic Cancer   No diagnosis found.    Plan:   EGD and Colonoscopy

## 2018-09-20 NOTE — LETTER
October 1, 2018      Da Matson MD  [address]  [facility] [address]           [facility]  [address] [place] [address]  [place] [address]  Phone: [phone]  Fax: [phone]          Patient: Violet Swenson   MR Number: [id number]   YOB: 1940   Date of Visit: 9/20/2018       Dear Dr. Da Matson:    Thank you for referring Violet Swenson to me for evaluation. Attached you will find relevant portions of my assessment and plan of care.    If you have questions, please do not hesitate to call me. I look forward to following Violet Swenson along with you.    Sincerely,    Saúl Arthur III, MD    Enclosure  CC:  No Recipients    If you would like to receive this communication electronically, please contact [email or web address] or [phone] to request more information on [facility] access.    For providers and/or their staff who would like to refer a patient to [facility], please contact us through our one-stop-shop provider referral line, [facility], at [phone].    If you feel you have received this communication in error or would no longer like to receive these types of communications, please e-mail [email or web address]

## 2018-09-21 ENCOUNTER — CLINICAL SUPPORT (OUTPATIENT)
Dept: REHABILITATION | Facility: HOSPITAL | Age: 78
End: 2018-09-21
Attending: PHYSICAL MEDICINE & REHABILITATION
Payer: MEDICARE

## 2018-09-21 DIAGNOSIS — M54.10 RADICULITIS: Primary | ICD-10-CM

## 2018-09-21 DIAGNOSIS — G89.29 CHRONIC RIGHT-SIDED LOW BACK PAIN WITH RIGHT-SIDED SCIATICA: ICD-10-CM

## 2018-09-21 DIAGNOSIS — M46.1 SACROILIITIS: ICD-10-CM

## 2018-09-21 DIAGNOSIS — M54.41 CHRONIC RIGHT-SIDED LOW BACK PAIN WITH RIGHT-SIDED SCIATICA: ICD-10-CM

## 2018-09-21 PROCEDURE — 97140 MANUAL THERAPY 1/> REGIONS: CPT

## 2018-09-21 PROCEDURE — 97161 PT EVAL LOW COMPLEX 20 MIN: CPT

## 2018-09-21 PROCEDURE — G8979 MOBILITY GOAL STATUS: HCPCS | Mod: CJ

## 2018-09-21 PROCEDURE — G8978 MOBILITY CURRENT STATUS: HCPCS | Mod: CL

## 2018-09-21 PROCEDURE — 97110 THERAPEUTIC EXERCISES: CPT

## 2018-09-21 NOTE — PROGRESS NOTES
"PHYSICAL THERAPY INITIAL OUTPATIENT EVALUATION    Referring Provider:  Dr. Lorraine Rizo    Diagnosis:       ICD-10-CM ICD-9-CM    1. Radiculitis M54.10 729.2    2. Chronic right-sided low back pain with right-sided sciatica M54.41 724.2     G89.29 724.3      338.29    3. Sacroiliitis M46.1 720.2        Orders:  Evaluate and Treat   Date : 09/21/2018    Visit # 1     SUBJECTIVE  78 y.o.  female being seen in clinic today for increased pain in her right low back pain with sharp, stabbing pain and pain into her leg.  States she bent over to  string on floor and when came up she felt a catch in lower back. Concentrated in lower right LSp. Her symptoms have persisted over the past 2 months. In the morning, she is stiff.  Moving and changing position provides some relief. Once bends is hard to straighten.  Feels more uncomfortable in static postures and has to frequently adjust posture .   No numbness / tingling . Pain will extends down to knee at times , is constant in lower back.  Comfortable lying on left side with right hip flexed " up and over ".   Meloxicam and gabapentin - with some relief . Seems to help neuropathy.      Onset: 2 months  Constant/ intermittent:  Constant - varies in intensity    Pain at worst: 10/10  Pain at best: 2-3/10  Pain currently:  4/10    Patient goal for PT: Pain relief     Occupation / daily activities: retired     Function: Patient reports 66 % disability based on score of the Oswestry Low back Pain questionnaire Scale.     The following scores are patient-reported and range from 0-5, with 0 being least impaired and 5 being most impaired.    Section 1- Pain intensity    Score 1/5   Section 2- Person care  Score 2/5   Section 3 Lifting- Optional  Score 5/5     Section 4  Walking  Score 4/5  Section 5 Sitting   Score 4/5   Section 6 Standing  Score 5/5  Section 7 Sleeping  Score 2/5   Section 8 Social Life   Score 3/5  Section 9 Traveling  Score 4/5   Section 10 Employ/home "  Score 3/5      OBJECTIVE :    Observation / Posture: Increased TSp kyphosis with runded shoulder and forward head posture , level iliac crest , level PSIS    Gait: ambulates with RW , does have cane as well. Balance issues - will drift laterally . At home usually no AD - will manage with hand held assist on wall , furniture as needed.     Lumbar AROM:   % Pain Present (Y/N)   FB 66  Yes   BB 75  no   RSB 50 Yes   LSB 66 no   RR 75  no   LR 75 Yes     Other AROM/PROM: decreased right hip IR to < 20 deg     Lower Extremity Strength:   WFL, No Myotomal weakness and weakness noted    Other strength: poor activation of trans abd / trunk extensors - weakness in both noted with lower limb elevation     Neuro/Sensation:     Reflexes :  Patellar- 2+     Achilles- 2+     Special Test:   SLR:   Positive   Slump: Positive   SI forward bend: Negative   Sacral / Pelvic positioning: level and symmetrical   Shear Testing:  negative    Joint Mobility:  Hypomobility / guarding mildly     Palpation:  Tender to palpation at right L5-S1 facet level into paraspinals and less so into glut / piriformis       ASSESSMENT:  The patient is a 78 y.o. year old female who presents to physical therapy with complaints of 2 months of right lower back pain with pain extending to right leg. She has notable relief with distraction / opening of the lower LSp facets and has a notable component of adverse neural mobility. Likely began 2 months ago with facet dysfunction in a setting of lower quarter weakness and decreased stabilization strength. These impairments are limiting patient's ability to perform ADLs.  Patient's prognosis is good.  Patient will benefit from skilled physical therapy intervention to restore function with ADLs.    Functional Limitations and Goal:   This patient's primary Physical Therapy goal is to improve his current level of function(Walking and moving around ) with minimal limitations. The patient's current level of  impairment is 60-79% Impaired(CL) based on their score of 66% impaired on the Oswestry Questionairre. The Patient is expected to achieve a score of 20-39%(CJ) within 10 sessions of treatment    Co-morbidities which may impact the plan of care and potentially impede the patient's progress in therapy include:   Diagnosis    Age-related osteoporosis without current pathological fracture    Anemia    Anxiety    Arthritis    Cancer    Chronic midline low back pain with right-sided sciatica    Coronary artery disease    Depression    Disorder of kidney and ureter    Encounter for blood transfusion    GERD (gastroesophageal reflux disease)    Gout, arthritis    Heart failure    Hx of psychiatric care    Hyperlipidemia    Hypertension    Hypothyroidism    Immune deficiency disorder    Lung nodule    Obesity    Pacemaker    Paroxysmal atrial fibrillation    Pneumonia    Polyneuropathy    Psychiatric problem    Tobacco dependence       Trouble in sleeping     Patients CLINICAL PRESENTATION is STABLE .    Short Term Goals:    1. Pt to reports a subjective decrease in pain  2. Pt to be instructed in a home exercise program / self care plan      Long Term Goals:  1 .Pt to score  < 30%  on Oswestry Low back pain disability questionnaire  2. Pt to report minimal to no LBP with ADLS- dressing , grooming   3. Pt to have LSp ROM pain kareem and > 75% all directions   4. Pt to have negative neural mobility testing      TREATMENT PROVIDED:  -Evaluation  -Manual Therapy:  Right L4-5, 5-S1 gapping mobs and opening mobs , STM to right paraspinals in positional distraction  ( 15 mins )   -Therapeutic Exercise:  Instructed in piriformis stretch 4 x 20 sec , neural gliding , LTR for mobility x 3 mins , and educated on use of positional distraction.  ( 15 mins )   -Modalities: MH to LSp x 10 mins   -Education on proper posture, body mechanics and condition    PLAN:  Patient will benefit from physical therapy (2)  x/week for (6) weeks including manual therapy, therapeutic exercise, functional activities, modalities, and patient education.    Thank you for this referral.    Nash Jones PT, OCS      MD Signature : _______________________________________________________  These services are reasonable and necessary for the conditions set forth above while under my care.

## 2018-09-24 ENCOUNTER — DOCUMENTATION ONLY (OUTPATIENT)
Dept: ENDOSCOPY | Facility: HOSPITAL | Age: 78
End: 2018-09-24

## 2018-09-24 DIAGNOSIS — F41.8 SITUATIONAL ANXIETY: ICD-10-CM

## 2018-09-24 DIAGNOSIS — F51.05 INSOMNIA SECONDARY TO ANXIETY: ICD-10-CM

## 2018-09-24 DIAGNOSIS — F32.9 MAJOR DEPRESSION, CHRONIC: ICD-10-CM

## 2018-09-24 DIAGNOSIS — F41.9 INSOMNIA SECONDARY TO ANXIETY: ICD-10-CM

## 2018-09-24 RX ORDER — SERTRALINE HYDROCHLORIDE 100 MG/1
TABLET, FILM COATED ORAL
Qty: 90 TABLET | Refills: 1 | Status: SHIPPED | OUTPATIENT
Start: 2018-09-24 | End: 2019-01-03 | Stop reason: SDUPTHER

## 2018-09-24 RX ORDER — ALPRAZOLAM 0.25 MG/1
TABLET ORAL
Qty: 60 TABLET | Refills: 0 | Status: SHIPPED | OUTPATIENT
Start: 2018-09-24 | End: 2018-11-26 | Stop reason: SDUPTHER

## 2018-09-24 NOTE — PROGRESS NOTES
9/24/2018  Per [employer], Person pressed touch tone key to speak with an endoscopy . Scheduled tentatively pending cardiac clearance approval. Instructed pt that cardiac clearance request will be sent within 30 days of scheduled procedure date. Instructions sent via mail per pt request.

## 2018-09-26 ENCOUNTER — CLINICAL SUPPORT (OUTPATIENT)
Dept: REHABILITATION | Facility: HOSPITAL | Age: 78
End: 2018-09-26
Attending: PHYSICAL MEDICINE & REHABILITATION
Payer: MEDICARE

## 2018-09-26 DIAGNOSIS — M46.1 SACROILIITIS: ICD-10-CM

## 2018-09-26 DIAGNOSIS — G89.29 CHRONIC RIGHT-SIDED LOW BACK PAIN WITH RIGHT-SIDED SCIATICA: ICD-10-CM

## 2018-09-26 DIAGNOSIS — M54.10 RADICULITIS: Primary | ICD-10-CM

## 2018-09-26 DIAGNOSIS — M54.41 CHRONIC RIGHT-SIDED LOW BACK PAIN WITH RIGHT-SIDED SCIATICA: ICD-10-CM

## 2018-09-26 PROCEDURE — 97110 THERAPEUTIC EXERCISES: CPT

## 2018-09-26 PROCEDURE — 97140 MANUAL THERAPY 1/> REGIONS: CPT

## 2018-09-26 PROCEDURE — 97014 ELECTRIC STIMULATION THERAPY: CPT

## 2018-09-26 NOTE — PROGRESS NOTES
"PHYSICAL THERAPY INITIAL OUTPATIENT EVALUATION    Referring Provider:  Dr. Lorraine Rizo    Diagnosis:       ICD-10-CM ICD-9-CM    1. Radiculitis M54.10 729.2    2. Chronic right-sided low back pain with right-sided sciatica M54.41 724.2     G89.29 724.3      338.29    3. Sacroiliitis M46.1 720.2        Orders:  Evaluate and Treat   Date : 09/26/2018    Visit # 2    SUBJECTIVE   Pt states that she tolerated the evaluation and treatment with no increased in pain.     HISTORY:  78 y.o.  female being seen in clinic today for increased pain in her right low back pain with sharp, stabbing pain and pain into her leg.  States she bent over to  string on floor and when came up she felt a catch in lower back. Concentrated in lower right LSp. Her symptoms have persisted over the past 2 months. In the morning, she is stiff.  Moving and changing position provides some relief. Once bends is hard to straighten.  Feels more uncomfortable in static postures and has to frequently adjust posture .   No numbness / tingling . Pain will extends down to knee at times , is constant in lower back.  Comfortable lying on left side with right hip flexed " up and over ".   Meloxicam and gabapentin - with some relief . Seems to help neuropathy.      Onset: 2 months  Constant/ intermittent:  Constant - varies in intensity    Pain at worst: 10/10  Pain at best: 2-3/10  Pain currently:  4/10    Patient goal for PT: Pain relief     Occupation / daily activities: retired     OBJECTIVE :    Observation / Posture: Increased TSp kyphosis with runded shoulder and forward head posture , level iliac crest , level PSIS    Gait: ambulates with RW , does have cane as well. Balance issues - will drift laterally . At home usually no AD - will manage with hand held assist on wall , furniture as needed.     Lumbar AROM:   % Pain Present (Y/N)   FB 66  Yes   BB 75  no   RSB 50 Yes   LSB 66 no   RR 75  no   LR 75 Yes     Other AROM/PROM: decreased right " hip IR to < 20 deg     Lower Extremity Strength:   WFL, No Myotomal weakness and weakness noted    Other strength: poor activation of trans abd / trunk extensors - weakness in both noted with lower limb elevation     Neuro/Sensation:     Reflexes :  Patellar- 2+     Achilles- 2+     Special Test:   SLR:   Positive   Slump: Positive   SI forward bend: Negative   Sacral / Pelvic positioning: level and symmetrical   Shear Testing:  negative    Joint Mobility:  Hypomobility / guarding mildly     Palpation:  Tender to palpation at right L5-S1 facet level into paraspinals and less so into glut / piriformis     TREATMENT PROVIDED:  -Manual Therapy:  Right L4-5, 5-S1 gapping mobs and opening mobs , STM to right paraspinals in positional distraction , LSp flexion , opening mobs in side ly  ( 15 mins )   -Therapeutic Exercise: DKTC and LTR with ball 3 mins each ,  piriformis stretch 4 x 20 sec , neural gliding , PPT x 30 , bilateral hip ER 3 x 10 , positional distraction x 5 mins   ( 30 mins )   -Modalities: MH to LSp x 10 mins   -Education on proper posture, body mechanics and condition    ASSESSMENT:  The patient is a 78 y.o. year old female who presents to physical therapy with complaints of 2 months of right lower back pain with pain extending to right leg. She has notable relief with distraction / opening of the lower LSp facets and has a notable component of adverse neural mobility.   Tolerated treatment well. Reports significant relief with positional distraction.     Co-morbidities which may impact the plan of care and potentially impede the patient's progress in therapy include:   Diagnosis    Age-related osteoporosis without current pathological fracture    Anemia    Anxiety    Arthritis    Cancer    Chronic midline low back pain with right-sided sciatica    Coronary artery disease    Depression    Disorder of kidney and ureter    Encounter for blood transfusion    GERD (gastroesophageal reflux disease)     Gout, arthritis    Heart failure    Hx of psychiatric care    Hyperlipidemia    Hypertension    Hypothyroidism    Immune deficiency disorder    Lung nodule    Obesity    Pacemaker    Paroxysmal atrial fibrillation    Pneumonia    Polyneuropathy    Psychiatric problem    Tobacco dependence       Trouble in sleeping     Patients CLINICAL PRESENTATION is STABLE .    Short Term Goals:    1. Pt to reports a subjective decrease in pain  2. Pt to be instructed in a home exercise program / self care plan      Long Term Goals:  1 .Pt to score  < 30%  on Oswestry Low back pain disability questionnaire  2. Pt to report minimal to no LBP with ADLS- dressing , grooming   3. Pt to have LSp ROM pain kareem and > 75% all directions   4. Pt to have negative neural mobility testing      PLAN:  Patient will benefit from physical therapy (2) x/week for (6) weeks including manual therapy, therapeutic exercise, functional activities, modalities, and patient education.    Thank you for this referral.    Nash Jones, PT, OCS

## 2018-10-02 ENCOUNTER — CLINICAL SUPPORT (OUTPATIENT)
Dept: REHABILITATION | Facility: HOSPITAL | Age: 78
End: 2018-10-02
Attending: PHYSICAL MEDICINE & REHABILITATION
Payer: MEDICARE

## 2018-10-02 DIAGNOSIS — M54.10 RADICULITIS: Primary | ICD-10-CM

## 2018-10-02 DIAGNOSIS — M46.1 SACROILIITIS: ICD-10-CM

## 2018-10-02 DIAGNOSIS — M54.41 CHRONIC RIGHT-SIDED LOW BACK PAIN WITH RIGHT-SIDED SCIATICA: ICD-10-CM

## 2018-10-02 DIAGNOSIS — G89.29 CHRONIC RIGHT-SIDED LOW BACK PAIN WITH RIGHT-SIDED SCIATICA: ICD-10-CM

## 2018-10-02 PROCEDURE — 97110 THERAPEUTIC EXERCISES: CPT

## 2018-10-02 PROCEDURE — 97014 ELECTRIC STIMULATION THERAPY: CPT

## 2018-10-02 PROCEDURE — 97140 MANUAL THERAPY 1/> REGIONS: CPT

## 2018-10-02 NOTE — PROGRESS NOTES
"PHYSICAL THERAPY INITIAL OUTPATIENT EVALUATION    Referring Provider:  Dr. Lorraine Rizo    Diagnosis:       ICD-10-CM ICD-9-CM    1. Radiculitis M54.10 729.2    2. Chronic right-sided low back pain with right-sided sciatica M54.41 724.2     G89.29 724.3      338.29    3. Sacroiliitis M46.1 720.2        Orders:  Evaluate and Treat   Date : 10/02/2018    Visit # 3    SUBJECTIVE   Pt reports increased pain today . Lower LSp - right > left and extending to leg - deep aching pain.     HISTORY:  78 y.o.  female being seen in clinic today for increased pain in her right low back pain with sharp, stabbing pain and pain into her leg.  States she bent over to  string on floor and when came up she felt a catch in lower back. Concentrated in lower right LSp. Her symptoms have persisted over the past 2 months. In the morning, she is stiff.  Moving and changing position provides some relief. Once bends is hard to straighten.  Feels more uncomfortable in static postures and has to frequently adjust posture .   No numbness / tingling . Pain will extends down to knee at times , is constant in lower back.  Comfortable lying on left side with right hip flexed " up and over ".   Meloxicam and gabapentin - with some relief . Seems to help neuropathy.      Onset: 2 months  Constant/ intermittent:  Constant - varies in intensity    Pain at worst: 10/10  Pain at best: 2-3/10  Pain currently:  4/10    Patient goal for PT: Pain relief     Occupation / daily activities: retired     OBJECTIVE :    Observation / Posture: Increased TSp kyphosis with runded shoulder and forward head posture , level iliac crest , level PSIS    Gait: ambulates with RW , does have cane as well. Balance issues - will drift laterally . At home usually no AD - will manage with hand held assist on wall , furniture as needed.     Lumbar AROM:   % Pain Present (Y/N)   FB 50 Yes   BB 75  no   RSB 50 Yes   LSB 66 no   RR 75  no   LR 75 Yes     Other AROM/PROM: " decreased right hip IR to < 20 deg     Lower Extremity Strength:   WFL, No Myotomal weakness and weakness noted    Other strength: poor activation of trans abd / trunk extensors - weakness in both noted with lower limb elevation     Neuro/Sensation:     Reflexes :  Patellar- 2+     Achilles- 2+     Special Test:   SLR:   Positive   Slump: Positive   SI forward bend: Negative   Sacral / Pelvic positioning: level and symmetrical   Shear Testing:  negative    Joint Mobility:  Hypomobility / guarding mildly     Palpation:  Tender to palpation at right L5-S1 facet level into paraspinals and less so into glut / piriformis     TREATMENT PROVIDED:  -Manual Therapy:  Seated PA mobs to LSp  , STM to right paraspinals in sitting   ( 15 mins )   -Therapeutic Exercise: ball roll-outs  Forward and to left 3  mins each, neural gliding , PPT x 30 , bilateral hip ER 3 x 10 , seated rows 3 x 10 green band , seated paloff press 3 x 10 with green band , TA activation with Tball press downs x 30    ( 30 mins )   -Modalities: MH to LSp x 10 mins   -Education on proper posture, body mechanics and condition    ASSESSMENT:  The patient is a 78 y.o. year old female who presents to physical therapy with complaints of 2 months of right lower back pain with pain extending to right leg. She has notable relief with distraction / opening of the lower LSp facets and has a notable component of adverse neural mobility.   Decreased tolerance today with flare up in pain. Able to complete modified program without increased pain. Unable to get into positional distraction position. Will reassess next session.     Co-morbidities which may impact the plan of care and potentially impede the patient's progress in therapy include:   Diagnosis    Age-related osteoporosis without current pathological fracture    Anemia    Anxiety    Arthritis    Cancer    Chronic midline low back pain with right-sided sciatica    Coronary artery disease    Depression     Disorder of kidney and ureter    Encounter for blood transfusion    GERD (gastroesophageal reflux disease)    Gout, arthritis    Heart failure    Hx of psychiatric care    Hyperlipidemia    Hypertension    Hypothyroidism    Immune deficiency disorder    Lung nodule    Obesity    Pacemaker    Paroxysmal atrial fibrillation    Pneumonia    Polyneuropathy    Psychiatric problem    Tobacco dependence       Trouble in sleeping     Patients CLINICAL PRESENTATION is STABLE .    Short Term Goals:    1. Pt to reports a subjective decrease in pain  2. Pt to be instructed in a home exercise program / self care plan      Long Term Goals:  1 .Pt to score  < 30%  on Oswestry Low back pain disability questionnaire  2. Pt to report minimal to no LBP with ADLS- dressing , grooming   3. Pt to have LSp ROM pain kareem and > 75% all directions   4. Pt to have negative neural mobility testing      PLAN:  Patient will benefit from physical therapy (2) x/week for (6) weeks including manual therapy, therapeutic exercise, functional activities, modalities, and patient education.    Thank you for this referral.    Nash Jones, PT, OCS

## 2018-10-04 ENCOUNTER — TELEPHONE (OUTPATIENT)
Dept: RHEUMATOLOGY | Facility: CLINIC | Age: 78
End: 2018-10-04

## 2018-10-04 ENCOUNTER — OFFICE VISIT (OUTPATIENT)
Dept: OPHTHALMOLOGY | Facility: CLINIC | Age: 78
End: 2018-10-04
Payer: MEDICARE

## 2018-10-04 DIAGNOSIS — H43.813 PVD (POSTERIOR VITREOUS DETACHMENT), BILATERAL: ICD-10-CM

## 2018-10-04 DIAGNOSIS — H04.123 DRY EYES, BILATERAL: ICD-10-CM

## 2018-10-04 DIAGNOSIS — H50.00 ESOTROPIA: Primary | ICD-10-CM

## 2018-10-04 DIAGNOSIS — H52.4 BILATERAL PRESBYOPIA: ICD-10-CM

## 2018-10-04 PROCEDURE — 92015 DETERMINE REFRACTIVE STATE: CPT | Mod: ,,, | Performed by: OPTOMETRIST

## 2018-10-04 PROCEDURE — 99212 OFFICE O/P EST SF 10 MIN: CPT | Mod: PBBFAC | Performed by: OPTOMETRIST

## 2018-10-04 PROCEDURE — 92014 COMPRE OPH EXAM EST PT 1/>: CPT | Mod: S$PBB,,, | Performed by: OPTOMETRIST

## 2018-10-04 PROCEDURE — 99999 PR PBB SHADOW E&M-EST. PATIENT-LVL II: CPT | Mod: PBBFAC,,, | Performed by: OPTOMETRIST

## 2018-10-04 NOTE — PROGRESS NOTES
HPI     Eye Exam      Additional comments: Annual              Comments     EP: Yearly Eye Exam  Patient States she can not see near vision with her current glasses but   distance vision is good and from last eye visit she notice a film over her   eyes, but mostly right eye.  OU: Refresh Day & Night Everyday          Last edited by Guy Mireles, OD on 10/4/2018 10:16 AM. (History)            Assessment /Plan     For exam results, see Encounter Report.    Esotropia    Dry eyes, bilateral    PVD (posterior vitreous detachment), bilateral    Bilateral presbyopia      Stable Esotropia, no diplopia.    Increase Refresh tears to four times daily.    Stable PVD OU, no holes tears or detachments.    Dispense Final Rx for glasses with Prism  RTC 1 year  Discussed above and answered questions.

## 2018-10-04 NOTE — PATIENT INSTRUCTIONS
Esotropia     Dry eyes, bilateral     PVD (posterior vitreous detachment), bilateral     Bilateral presbyopia        Stable Esotropia, no diplopia.    Mild cataracts.     Increase Refresh tears to four times daily.     Stable PVD OU, no holes tears or detachments.     Dispense Final Rx for glasses with Prism  RTC 1 year  Discussed above and answered questions.

## 2018-10-04 NOTE — TELEPHONE ENCOUNTER
----- Message from Thais Dean LPN sent at 10/4/2018  3:03 PM CDT -----  Contact: [facility]        ----- Message -----  From: Vasile Oglesby  Sent: 10/4/2018   2:15 PM  To: Patricia MINA Staff    Pharmacist is calling regarding script clarification on Vitamin D Pharmacist states the script is for  One a week. Pharmacist states that Pt is saying she is taking script is taken once a day, and a separately for 3 times a week. .      ..  [facility] [place] [address]  [address] [facility] [address]  Phone: [phone] Fax: [phone]

## 2018-10-04 NOTE — TELEPHONE ENCOUNTER
Talked with Pharmacist clarified that Pt should be taking the Vitamin D once every 7 days as prescribed

## 2018-10-05 ENCOUNTER — CLINICAL SUPPORT (OUTPATIENT)
Dept: REHABILITATION | Facility: HOSPITAL | Age: 78
End: 2018-10-05
Attending: PHYSICAL MEDICINE & REHABILITATION
Payer: MEDICARE

## 2018-10-05 DIAGNOSIS — M54.10 RADICULITIS: ICD-10-CM

## 2018-10-05 DIAGNOSIS — M54.41 CHRONIC RIGHT-SIDED LOW BACK PAIN WITH RIGHT-SIDED SCIATICA: Primary | ICD-10-CM

## 2018-10-05 DIAGNOSIS — M46.1 SACROILIITIS: ICD-10-CM

## 2018-10-05 DIAGNOSIS — G89.29 CHRONIC RIGHT-SIDED LOW BACK PAIN WITH RIGHT-SIDED SCIATICA: Primary | ICD-10-CM

## 2018-10-05 PROCEDURE — 97140 MANUAL THERAPY 1/> REGIONS: CPT

## 2018-10-05 PROCEDURE — 97110 THERAPEUTIC EXERCISES: CPT

## 2018-10-05 NOTE — PROGRESS NOTES
"PHYSICAL THERAPY INITIAL OUTPATIENT EVALUATION    Referring Provider:  Dr. Lorraine Rizo    Diagnosis:       ICD-10-CM ICD-9-CM    1. 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 Radiculitis M54.10 729.2    3. Sacroiliitis M46.1 720.2        Orders:  Evaluate and Treat   Date : 10/05/2018    Visit # 4    SUBJECTIVE   Pt reports pain is not as bad in the leg. More in posterior glut/ hip. Tolerating exercise well with occasional rest breaks.     HISTORY:  78 y.o.  female being seen in clinic today for increased pain in her right low back pain with sharp, stabbing pain and pain into her leg.  States she bent over to  string on floor and when came up she felt a catch in lower back. Concentrated in lower right LSp. Her symptoms have persisted over the past 2 months. In the morning, she is stiff.  Moving and changing position provides some relief. Once bends is hard to straighten.  Feels more uncomfortable in static postures and has to frequently adjust posture .   No numbness / tingling . Pain will extends down to knee at times , is constant in lower back.  Comfortable lying on left side with right hip flexed " up and over ".   Meloxicam and gabapentin - with some relief . Seems to help neuropathy.      Onset: 2 months  Constant/ intermittent:  Constant - varies in intensity    Pain at worst: 10/10  Pain at best: 2-3/10  Pain currently:  4/10    Patient goal for PT: Pain relief     Occupation / daily activities: retired     OBJECTIVE :    Observation / Posture: Increased TSp kyphosis with runded shoulder and forward head posture , level iliac crest , level PSIS    Gait: ambulates with RW , does have cane as well. Balance issues - will drift laterally . At home usually no AD - will manage with hand held assist on wall , furniture as needed.     Lumbar AROM:   % Pain Present (Y/N)   FB 50 Yes   BB 75  no   RSB 50 Yes   LSB 66 no   RR 75  no   LR 75 " Yes     Other AROM/PROM: decreased right hip IR to < 20 deg     Lower Extremity Strength:   WFL, No Myotomal weakness and weakness noted    Other strength: poor activation of trans abd / trunk extensors - weakness in both noted with lower limb elevation     Neuro/Sensation:     Reflexes :  Patellar- 2+     Achilles- 2+     Special Test:   SLR:   Positive   Slump: Positive   SI forward bend: Negative   Sacral / Pelvic positioning: level and symmetrical   Shear Testing:  negative    Joint Mobility:  Hypomobility / guarding mildly     Palpation:  Tender to palpation at right L5-S1 facet level into paraspinals and less so into glut / piriformis     TREATMENT PROVIDED:  -Manual Therapy:  Seated PA mobs to LSp  , STM to right paraspinals in sitting   ( 15 mins )   -Therapeutic Exercise: ball roll-outs  Forward and to left 3  mins each, neural gliding , piriformis stretch 4 x 30 sec , PPT x 30 , bilateral hip ER 3 x 10 , seated rows 3 x 10 green band , seated paloff press 3 x 10 with green band , TA activation with Tball press downs x 30,     ( 35 mins )   -Modalities: MH to LSp x 10 mins   -Education on proper posture, body mechanics and condition    ASSESSMENT:  The patient is a 78 y.o. year old female who presents to physical therapy with complaints of 2 months of right lower back pain with pain extending to right leg. She has notable relief with distraction / opening of the lower LSp facets and has a notable component of adverse neural mobility.   Tolerated increased exercise well . Will benefit from continued trunk stability exercises and progression of mobility / aerobic movement.     Co-morbidities which may impact the plan of care and potentially impede the patient's progress in therapy include:   Diagnosis    Age-related osteoporosis without current pathological fracture    Anemia    Anxiety    Arthritis    Cancer    Chronic midline low back pain with right-sided sciatica    Coronary artery disease     Depression    Disorder of kidney and ureter    Encounter for blood transfusion    GERD (gastroesophageal reflux disease)    Gout, arthritis    Heart failure    Hx of psychiatric care    Hyperlipidemia    Hypertension    Hypothyroidism    Immune deficiency disorder    Lung nodule    Obesity    Pacemaker    Paroxysmal atrial fibrillation    Pneumonia    Polyneuropathy    Psychiatric problem    Tobacco dependence       Trouble in sleeping     Patients CLINICAL PRESENTATION is STABLE .    Short Term Goals:    1. Pt to reports a subjective decrease in pain   MET   2. Pt to be instructed in a home exercise program / self care plan   MET      Long Term Goals:  1 .Pt to score  < 30%  on Oswestry Low back pain disability questionnaire  2. Pt to report minimal to no LBP with ADLS- dressing , grooming   3. Pt to have LSp ROM pain kareem and > 75% all directions   4. Pt to have negative neural mobility testing      PLAN:  Patient will benefit from physical therapy (2) x/week for (6) weeks including manual therapy, therapeutic exercise, functional activities, modalities, and patient education.    Thank you for this referral.    Nash Jones, PT, OCS

## 2018-10-08 ENCOUNTER — CLINICAL SUPPORT (OUTPATIENT)
Dept: REHABILITATION | Facility: HOSPITAL | Age: 78
End: 2018-10-08
Attending: PHYSICAL MEDICINE & REHABILITATION
Payer: MEDICARE

## 2018-10-08 DIAGNOSIS — F41.8 SITUATIONAL ANXIETY: ICD-10-CM

## 2018-10-08 DIAGNOSIS — M46.1 SACROILIITIS: ICD-10-CM

## 2018-10-08 DIAGNOSIS — M54.41 CHRONIC RIGHT-SIDED LOW BACK PAIN WITH RIGHT-SIDED SCIATICA: Primary | ICD-10-CM

## 2018-10-08 DIAGNOSIS — M15.9 PRIMARY OSTEOARTHRITIS INVOLVING MULTIPLE JOINTS: ICD-10-CM

## 2018-10-08 DIAGNOSIS — I25.110 CORONARY ARTERY DISEASE INVOLVING NATIVE CORONARY ARTERY OF NATIVE HEART WITH UNSTABLE ANGINA PECTORIS: ICD-10-CM

## 2018-10-08 DIAGNOSIS — G89.29 CHRONIC RIGHT-SIDED LOW BACK PAIN WITH RIGHT-SIDED SCIATICA: Primary | ICD-10-CM

## 2018-10-08 DIAGNOSIS — R94.2 DIFFUSION CAPACITY OF LUNG (DL), DECREASED: Chronic | ICD-10-CM

## 2018-10-08 DIAGNOSIS — I25.5 ISCHEMIC CARDIOMYOPATHY: ICD-10-CM

## 2018-10-08 DIAGNOSIS — M54.10 RADICULITIS: ICD-10-CM

## 2018-10-08 PROCEDURE — 97110 THERAPEUTIC EXERCISES: CPT

## 2018-10-08 PROCEDURE — 97140 MANUAL THERAPY 1/> REGIONS: CPT

## 2018-10-08 RX ORDER — MELOXICAM 7.5 MG/1
TABLET ORAL
Qty: 90 TABLET | Refills: 3 | Status: SHIPPED | OUTPATIENT
Start: 2018-10-08 | End: 2019-10-02 | Stop reason: SDUPTHER

## 2018-10-08 RX ORDER — METOPROLOL TARTRATE 25 MG/1
TABLET, FILM COATED ORAL
Qty: 60 TABLET | Refills: 2 | Status: SHIPPED | OUTPATIENT
Start: 2018-10-08 | End: 2019-01-03 | Stop reason: SDUPTHER

## 2018-10-08 RX ORDER — FUROSEMIDE 20 MG/1
TABLET ORAL
Qty: 30 TABLET | Refills: 11 | Status: SHIPPED | OUTPATIENT
Start: 2018-10-08 | End: 2018-11-02

## 2018-10-08 RX ORDER — ISOSORBIDE MONONITRATE 30 MG/1
TABLET, EXTENDED RELEASE ORAL
Qty: 30 TABLET | Refills: 11 | Status: SHIPPED | OUTPATIENT
Start: 2018-10-08 | End: 2019-02-12

## 2018-10-08 NOTE — PROGRESS NOTES
"PHYSICAL THERAPY INITIAL OUTPATIENT EVALUATION    Referring Provider:  Dr. Lorraine Rizo    Diagnosis:       ICD-10-CM ICD-9-CM    1. 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 Radiculitis M54.10 729.2    3. Sacroiliitis M46.1 720.2        Orders:  Evaluate and Treat   Date : 10/08/2018    Visit # 4    SUBJECTIVE   Pt reports continued pain - right sided at PSIS - lumbosacral level. Little change to date.      HISTORY:  78 y.o.  female being seen in clinic today for increased pain in her right low back pain with sharp, stabbing pain and pain into her leg.  States she bent over to  string on floor and when came up she felt a catch in lower back. Concentrated in lower right LSp. Her symptoms have persisted over the past 2 months. In the morning, she is stiff.  Moving and changing position provides some relief. Once bends is hard to straighten.  Feels more uncomfortable in static postures and has to frequently adjust posture .   No numbness / tingling . Pain will extends down to knee at times , is constant in lower back.  Comfortable lying on left side with right hip flexed " up and over ".   Meloxicam and gabapentin - with some relief . Seems to help neuropathy.      Onset: 2 months  Constant/ intermittent:  Constant - varies in intensity    Pain at worst: 10/10  Pain at best: 2-3/10  Pain currently:  4/10    Patient goal for PT: Pain relief     Occupation / daily activities: retired     OBJECTIVE :    Observation / Posture: Increased TSp kyphosis with runded shoulder and forward head posture , level iliac crest , level PSIS    Gait: ambulates with RW , does have cane as well. Balance issues - will drift laterally . At home usually no AD - will manage with hand held assist on wall , furniture as needed.     Lumbar AROM:   % Pain Present (Y/N)   FB 50 Yes   BB 75  no   RSB 50 Yes   LSB 66 no   RR 75  no   LR 75 Yes     Other AROM/PROM: decreased " right hip IR to < 20 deg     Lower Extremity Strength:   WFL, No Myotomal weakness and weakness noted    Other strength: poor activation of trans abd / trunk extensors - weakness in both noted with lower limb elevation     Neuro/Sensation:     Reflexes :  Patellar- 2+     Achilles- 2+     Special Test:   SLR:   Positive   Slump: Positive   SI forward bend: Negative   Sacral / Pelvic positioning: level and symmetrical   Shear Testing:  negative    Joint Mobility:  Hypomobility / guarding mildly     Palpation:  Tender to palpation at right L5-S1 facet level into paraspinals and less so into glut / piriformis     TREATMENT PROVIDED:  -Manual Therapy:  Side ly gapping mobs to L5-S1 and opening mobs over roll. STM to paraspinals  ( 15 mins )   -Therapeutic Exercise: ball roll-outs  Forward and to left 3  mins each, neural gliding , piriformis stretch 4 x 30 sec , PPT x 30 , bilateral hip ER 3 x 10 , seated rows 3 x 10 green band , seated paloff press 3 x 10 with green band , TA activation with Tball press downs x 30,     ( 35 mins )   -Modalities: MH to LSp x 10 mins   -Education on proper posture, body mechanics and condition    ASSESSMENT:  The patient is a 78 y.o. year old female who presents to physical therapy with complaints of 2 months of right lower back pain with pain extending to right leg. She has notable relief with distraction / opening of the lower LSp facets and has a notable component of adverse neural mobility.   Good tolerance to increased manual treatment focused on opening of lower LSp facets .      Co-morbidities which may impact the plan of care and potentially impede the patient's progress in therapy include:   Diagnosis    Age-related osteoporosis without current pathological fracture    Anemia    Anxiety    Arthritis    Cancer    Chronic midline low back pain with right-sided sciatica    Coronary artery disease    Depression    Disorder of kidney and ureter    Encounter for blood  transfusion    GERD (gastroesophageal reflux disease)    Gout, arthritis    Heart failure    Hx of psychiatric care    Hyperlipidemia    Hypertension    Hypothyroidism    Immune deficiency disorder    Lung nodule    Obesity    Pacemaker    Paroxysmal atrial fibrillation    Pneumonia    Polyneuropathy    Psychiatric problem    Tobacco dependence       Trouble in sleeping     Patients CLINICAL PRESENTATION is STABLE .    Short Term Goals:    1. Pt to reports a subjective decrease in pain   MET   2. Pt to be instructed in a home exercise program / self care plan   MET      Long Term Goals:  1 .Pt to score  < 30%  on Oswestry Low back pain disability questionnaire  2. Pt to report minimal to no LBP with ADLS- dressing , grooming   3. Pt to have LSp ROM pain kareem and > 75% all directions   4. Pt to have negative neural mobility testing      PLAN:  Patient will benefit from physical therapy (2) x/week for (6) weeks including manual therapy, therapeutic exercise, functional activities, modalities, and patient education.    Thank you for this referral.    Nash Jones, PT, OCS

## 2018-10-10 ENCOUNTER — CLINICAL SUPPORT (OUTPATIENT)
Dept: REHABILITATION | Facility: HOSPITAL | Age: 78
End: 2018-10-10
Attending: PHYSICAL MEDICINE & REHABILITATION
Payer: MEDICARE

## 2018-10-10 DIAGNOSIS — M54.10 RADICULITIS: ICD-10-CM

## 2018-10-10 DIAGNOSIS — M54.41 CHRONIC RIGHT-SIDED LOW BACK PAIN WITH RIGHT-SIDED SCIATICA: Primary | ICD-10-CM

## 2018-10-10 DIAGNOSIS — G89.29 CHRONIC RIGHT-SIDED LOW BACK PAIN WITH RIGHT-SIDED SCIATICA: Primary | ICD-10-CM

## 2018-10-10 PROCEDURE — 97140 MANUAL THERAPY 1/> REGIONS: CPT

## 2018-10-10 PROCEDURE — 97110 THERAPEUTIC EXERCISES: CPT

## 2018-10-10 NOTE — PROGRESS NOTES
"PHYSICAL THERAPY INITIAL OUTPATIENT EVALUATION    Referring Provider:  Dr. Lorraine Rizo    Diagnosis:       ICD-10-CM ICD-9-CM    1. 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 Radiculitis M54.10 729.2        Orders:  Evaluate and Treat   Date : 10/10/2018    Visit # 6    SUBJECTIVE   Pt reports no new complaints . Moving a little better . Doing HEP .     HISTORY:  78 y.o.  female being seen in clinic today for increased pain in her right low back pain with sharp, stabbing pain and pain into her leg.  States she bent over to  string on floor and when came up she felt a catch in lower back. Concentrated in lower right LSp. Her symptoms have persisted over the past 2 months. In the morning, she is stiff.  Moving and changing position provides some relief. Once bends is hard to straighten.  Feels more uncomfortable in static postures and has to frequently adjust posture .   No numbness / tingling . Pain will extends down to knee at times , is constant in lower back.  Comfortable lying on left side with right hip flexed " up and over ".   Meloxicam and gabapentin - with some relief . Seems to help neuropathy.      Onset: 2 months  Constant/ intermittent:  Constant - varies in intensity    Pain at worst: 10/10  Pain at best: 2-3/10  Pain currently:  4/10    Patient goal for PT: Pain relief     Occupation / daily activities: retired     OBJECTIVE :    Observation / Posture: Increased TSp kyphosis with runded shoulder and forward head posture , level iliac crest , level PSIS    Gait: ambulates with RW , does have cane as well. Balance issues - will drift laterally . At home usually no AD - will manage with hand held assist on wall , furniture as needed.     Lumbar AROM:   % Pain Present (Y/N)   FB 50 Yes   BB 75  no   RSB 50 Yes   LSB 66 no   RR 75  no   LR 75 Yes     Other AROM/PROM: decreased right hip IR to < 20 deg     Lower Extremity Strength:  "  WFL, No Myotomal weakness and weakness noted    Other strength: poor activation of trans abd / trunk extensors - weakness in both noted with lower limb elevation     Neuro/Sensation:     Reflexes :  Patellar- 2+     Achilles- 2+     Special Test:   SLR:   Positive   Slump: Positive   SI forward bend: Negative   Sacral / Pelvic positioning: level and symmetrical   Shear Testing:  negative    Joint Mobility:  Hypomobility / guarding mildly     Palpation:  Tender to palpation at right L5-S1 facet level into paraspinals and less so into glut / piriformis     TREATMENT PROVIDED:  -Manual Therapy:  TDN to right glut med , hip distraction mobs and mobs with IR/ER  ( 10 mins )   -Therapeutic Exercise: neural gliding , piriformis stretch 4 x 30 sec , PPT x 30 , bilateral hip ER 3 x 10 , seated rows 3 x 10 green band , seated paloff press 3 x 10 with green band , TA activation with Tball press downs x 30, LTR and DKTC with ball x 3 mins , piriformis stretch , neural glides passively      ( 35 mins )   -Modalities: MH to LSp x 10 mins   -Education on proper posture, body mechanics and condition    ASSESSMENT:  The patient is a 78 y.o. year old female who presents to physical therapy with complaints of 2 months of right lower back pain with pain extending to right leg. She has notable relief with distraction / opening of the lower LSp facets and has a notable component of adverse neural mobility.   Good tolerance to trial of TDN. Will benefit from increased stabilization exercises . Some increased ease of motion today.      Co-morbidities which may impact the plan of care and potentially impede the patient's progress in therapy include:   Diagnosis    Age-related osteoporosis without current pathological fracture    Anemia    Anxiety    Arthritis    Cancer    Chronic midline low back pain with right-sided sciatica    Coronary artery disease    Depression    Disorder of kidney and ureter    Encounter for blood  transfusion    GERD (gastroesophageal reflux disease)    Gout, arthritis    Heart failure    Hx of psychiatric care    Hyperlipidemia    Hypertension    Hypothyroidism    Immune deficiency disorder    Lung nodule    Obesity    Pacemaker    Paroxysmal atrial fibrillation    Pneumonia    Polyneuropathy    Psychiatric problem    Tobacco dependence       Trouble in sleeping     Patients CLINICAL PRESENTATION is STABLE .    Short Term Goals:    1. Pt to reports a subjective decrease in pain   MET   2. Pt to be instructed in a home exercise program / self care plan   MET      Long Term Goals:  1 .Pt to score  < 30%  on Oswestry Low back pain disability questionnaire  2. Pt to report minimal to no LBP with ADLS- dressing , grooming   3. Pt to have LSp ROM pain kareem and > 75% all directions   4. Pt to have negative neural mobility testing      PLAN:  Patient will benefit from physical therapy (2) x/week for (6) weeks including manual therapy, therapeutic exercise, functional activities, modalities, and patient education.    Thank you for this referral.    Nash Jones, PT, OCS

## 2018-10-11 RX ORDER — ALPRAZOLAM 0.25 MG/1
TABLET ORAL
Qty: 60 TABLET | OUTPATIENT
Start: 2018-10-11

## 2018-10-11 NOTE — TELEPHONE ENCOUNTER
Patient notified and states she did not request a refill, thinks that maybe the pharmacy sent an automatic request. Patient states to please disregard the request.

## 2018-10-15 ENCOUNTER — CLINICAL SUPPORT (OUTPATIENT)
Dept: REHABILITATION | Facility: HOSPITAL | Age: 78
End: 2018-10-15
Attending: PHYSICAL MEDICINE & REHABILITATION
Payer: MEDICARE

## 2018-10-15 DIAGNOSIS — G89.29 CHRONIC RIGHT-SIDED LOW BACK PAIN WITH RIGHT-SIDED SCIATICA: Primary | ICD-10-CM

## 2018-10-15 DIAGNOSIS — M46.1 SACROILIITIS: ICD-10-CM

## 2018-10-15 DIAGNOSIS — M54.41 CHRONIC RIGHT-SIDED LOW BACK PAIN WITH RIGHT-SIDED SCIATICA: Primary | ICD-10-CM

## 2018-10-15 DIAGNOSIS — M54.10 RADICULITIS: ICD-10-CM

## 2018-10-15 PROCEDURE — 97110 THERAPEUTIC EXERCISES: CPT

## 2018-10-15 NOTE — PROGRESS NOTES
"PHYSICAL THERAPY INITIAL OUTPATIENT EVALUATION    Referring Provider:  Dr. Lorraine Rizo    Diagnosis:       ICD-10-CM ICD-9-CM    1. 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 Radiculitis M54.10 729.2    3. Sacroiliitis M46.1 720.2        Orders:  Evaluate and Treat   Date : 10/15/2018    Visit # 6    SUBJECTIVE   Pt reports she is a little better. Reports she is very stiff in the am - once up and about she starts to rapidly feel better.        HISTORY:  78 y.o.  female being seen in clinic today for increased pain in her right low back pain with sharp, stabbing pain and pain into her leg.  States she bent over to  string on floor and when came up she felt a catch in lower back. Concentrated in lower right LSp. Her symptoms have persisted over the past 2 months. In the morning, she is stiff.  Moving and changing position provides some relief. Once bends is hard to straighten.  Feels more uncomfortable in static postures and has to frequently adjust posture .   No numbness / tingling . Pain will extends down to knee at times , is constant in lower back.  Comfortable lying on left side with right hip flexed " up and over ".   Meloxicam and gabapentin - with some relief . Seems to help neuropathy.      Onset: 2 months  Constant/ intermittent:  Constant - varies in intensity    Pain at worst: 10/10  Pain at best: 2-3/10  Pain currently:  4/10    Patient goal for PT: Pain relief     Occupation / daily activities: retired     OBJECTIVE :    Today: flexion - repetitive lumbar feels better with repetitions . Improved after lumbar lateral glides     Observation / Posture: Increased TSp kyphosis with runded shoulder and forward head posture , level iliac crest , level PSIS    Gait: ambulates with RW , does have cane as well. Balance issues - will drift laterally . At home usually no AD - will manage with hand held assist on wall , furniture as needed. "     Lumbar AROM:   % Pain Present (Y/N)   FB 50 Yes   BB 75  no   RSB 50 Yes   LSB 66 no   RR 75  no   LR 75 Yes     Other AROM/PROM: decreased right hip IR to < 20 deg     Lower Extremity Strength:   WFL, No Myotomal weakness and weakness noted    Other strength: poor activation of trans abd / trunk extensors - weakness in both noted with lower limb elevation     Neuro/Sensation:     Reflexes :  Patellar- 2+     Achilles- 2+     Special Test:   SLR:   Positive   Slump: Positive   SI forward bend: Negative   Sacral / Pelvic positioning: level and symmetrical   Shear Testing:  negative    Joint Mobility:  Hypomobility / guarding mildly     Palpation:  Tender to palpation at right L5-S1 facet level into paraspinals and less so into glut / piriformis     TREATMENT PROVIDED:  -Manual Therapy:  Lateral LSp glides ( 5 mins )   -Therapeutic Exercise: neural gliding , piriformis stretch 4 x 30 sec , PPT / APT x 30 , bilateral hip ER 3 x 10 , seated rows 3 x 10 green band , TA activation with Tball press downs x 30, LTR and DKTC with ball x 3 mins ,  neural glides passively , DKTC x 4 mins , SKTC 4 x 30 sec each      ( 40 mins )   -Modalities: MH to LSp x 10 mins   -Education on proper posture, body mechanics and condition    ASSESSMENT:  The patient is a 78 y.o. year old female who presents to physical therapy with complaints of 2 months of right lower back pain with pain extending to right leg. She has notable relief with distraction / opening of the lower LSp facets and has a notable component of adverse neural mobility.   Dysfunction with improvement on repetitive motions into flexion. Reviewed self care in am to ease into motion .     Co-morbidities which may impact the plan of care and potentially impede the patient's progress in therapy include:   Diagnosis    Age-related osteoporosis without current pathological fracture    Anemia    Anxiety    Arthritis    Cancer    Chronic midline low back pain with  right-sided sciatica    Coronary artery disease    Depression    Disorder of kidney and ureter    Encounter for blood transfusion    GERD (gastroesophageal reflux disease)    Gout, arthritis    Heart failure    Hx of psychiatric care    Hyperlipidemia    Hypertension    Hypothyroidism    Immune deficiency disorder    Lung nodule    Obesity    Pacemaker    Paroxysmal atrial fibrillation    Pneumonia    Polyneuropathy    Psychiatric problem    Tobacco dependence       Trouble in sleeping     Patients CLINICAL PRESENTATION is STABLE .    Short Term Goals:    1. Pt to reports a subjective decrease in pain   MET   2. Pt to be instructed in a home exercise program / self care plan   MET      Long Term Goals:  1 .Pt to score  < 30%  on Oswestry Low back pain disability questionnaire  2. Pt to report minimal to no LBP with ADLS- dressing , grooming   3. Pt to have LSp ROM pain kareem and > 75% all directions   4. Pt to have negative neural mobility testing      PLAN:  Patient will benefit from physical therapy (2) x/week for (6) weeks including manual therapy, therapeutic exercise, functional activities, modalities, and patient education.    Thank you for this referral.    Nash Jones, PT, OCS

## 2018-10-17 ENCOUNTER — CLINICAL SUPPORT (OUTPATIENT)
Dept: REHABILITATION | Facility: HOSPITAL | Age: 78
End: 2018-10-17
Attending: PHYSICAL MEDICINE & REHABILITATION
Payer: MEDICARE

## 2018-10-17 DIAGNOSIS — M54.41 CHRONIC RIGHT-SIDED LOW BACK PAIN WITH RIGHT-SIDED SCIATICA: Primary | ICD-10-CM

## 2018-10-17 DIAGNOSIS — M54.10 RADICULITIS: ICD-10-CM

## 2018-10-17 DIAGNOSIS — G89.29 CHRONIC RIGHT-SIDED LOW BACK PAIN WITH RIGHT-SIDED SCIATICA: Primary | ICD-10-CM

## 2018-10-17 PROCEDURE — 97110 THERAPEUTIC EXERCISES: CPT

## 2018-10-17 PROCEDURE — 97140 MANUAL THERAPY 1/> REGIONS: CPT

## 2018-10-17 NOTE — PROGRESS NOTES
"PHYSICAL THERAPY INITIAL OUTPATIENT EVALUATION    Referring Provider:  Dr. Lorraine Rizo    Diagnosis:       ICD-10-CM ICD-9-CM    1. 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 Radiculitis M54.10 729.2        Orders:  Evaluate and Treat   Date : 10/17/2018    Visit # 8    SUBJECTIVE   Pt reports continued aching / pain in the lower right LSp . Continues to be very stiff in the am or after sitting for prolonged periods - once up and about she starts to rapidly feel better.        HISTORY:  78 y.o.  female being seen in clinic today for increased pain in her right low back pain with sharp, stabbing pain and pain into her leg.  States she bent over to  string on floor and when came up she felt a catch in lower back. Concentrated in lower right LSp. Her symptoms have persisted over the past 2 months. In the morning, she is stiff.  Moving and changing position provides some relief. Once bends is hard to straighten.  Feels more uncomfortable in static postures and has to frequently adjust posture .   No numbness / tingling . Pain will extends down to knee at times , is constant in lower back.  Comfortable lying on left side with right hip flexed " up and over ".   Meloxicam and gabapentin - with some relief . Seems to help neuropathy.      Onset: 2 months  Constant/ intermittent:  Constant - varies in intensity    Pain at worst: 10/10  Pain at best: 2-3/10  Pain currently:  4/10    Patient goal for PT: Pain relief     Occupation / daily activities: retired     OBJECTIVE :    Today (Initial) : flexion -pain on flex to thigh , right side bend pain at 25%     Observation / Posture: Increased TSp kyphosis with runded shoulder and forward head posture , level iliac crest , level PSIS    Gait: ambulates with RW , does have cane as well. Balance issues - will drift laterally . At home usually no AD - will manage with hand held assist on wall , furniture " as needed.     Lumbar AROM:   % Pain Present (Y/N)   FB 50 Yes   BB 75  no   RSB 25 Yes   LSB 66 no   RR 75  no   LR 75 Yes     Other AROM/PROM: decreased right hip IR to < 20 deg     Lower Extremity Strength:   WFL, No Myotomal weakness and weakness noted    Other strength: poor activation of trans abd / trunk extensors - weakness in both noted with lower limb elevation     Neuro/Sensation:     Reflexes :  Patellar- 2+     Achilles- 2+     Special Test:   SLR:   Positive   Slump: Positive   SI forward bend: Negative   Sacral / Pelvic positioning: level and symmetrical   Shear Testing:  negative    Joint Mobility:  Hypomobility / guarding mildly     Palpation:  Tender to palpation at right L5-S1 facet level into paraspinals and less so into glut / piriformis     TREATMENT PROVIDED:  -Manual Therapy:  Gapping mobs to lower LSp , side ly flexion mobs , MET for opening LSp facets - right sided , lateral glides ( 25 mins )   -Therapeutic Exercise: neural gliding , piriformis stretch 4 x 30 sec , PPT / APT x 30 , LTR and DKTC with ball x 5 mins ,  neural glides passively , SKTC 4 x 30 sec each      ( 20 mins )   -Modalities: MH to LSp x 10 mins   -Education on proper posture, body mechanics and condition    ASSESSMENT:  The patient is a 78 y.o. year old female who presents to physical therapy with complaints of 2 months of right lower back pain with pain extending to right leg. She has notable relief with distraction / opening of the lower LSp facets and has a notable component of adverse neural mobility.   Right facet irritation - improved post manual treatment . Increased to flexion to toes without pain and full rotation . Continued limited side bend to right but better than prior.      Reviewed self care in am to ease into motion .     Co-morbidities which may impact the plan of care and potentially impede the patient's progress in therapy include:   Diagnosis    Age-related osteoporosis without current pathological  fracture    Anemia    Anxiety    Arthritis    Cancer    Chronic midline low back pain with right-sided sciatica    Coronary artery disease    Depression    Disorder of kidney and ureter    Encounter for blood transfusion    GERD (gastroesophageal reflux disease)    Gout, arthritis    Heart failure    Hx of psychiatric care    Hyperlipidemia    Hypertension    Hypothyroidism    Immune deficiency disorder    Lung nodule    Obesity    Pacemaker    Paroxysmal atrial fibrillation    Pneumonia    Polyneuropathy    Psychiatric problem    Tobacco dependence       Trouble in sleeping     Patients CLINICAL PRESENTATION is STABLE .    Short Term Goals:    1. Pt to reports a subjective decrease in pain   MET   2. Pt to be instructed in a home exercise program / self care plan   MET      Long Term Goals:  1 .Pt to score  < 30%  on Oswestry Low back pain disability questionnaire  2. Pt to report minimal to no LBP with ADLS- dressing , grooming   3. Pt to have LSp ROM pain kareem and > 75% all directions   4. Pt to have negative neural mobility testing      PLAN:  Patient will benefit from physical therapy (2) x/week for (6) weeks including manual therapy, therapeutic exercise, functional activities, modalities, and patient education.    Thank you for this referral.    Nash Jones, PT, OCS

## 2018-10-19 ENCOUNTER — OFFICE VISIT (OUTPATIENT)
Dept: CARDIOLOGY | Facility: CLINIC | Age: 78
End: 2018-10-19
Payer: MEDICARE

## 2018-10-19 VITALS
HEIGHT: 64 IN | WEIGHT: 166.88 LBS | DIASTOLIC BLOOD PRESSURE: 68 MMHG | SYSTOLIC BLOOD PRESSURE: 118 MMHG | BODY MASS INDEX: 28.49 KG/M2 | HEART RATE: 68 BPM

## 2018-10-19 DIAGNOSIS — E78.2 MIXED HYPERLIPIDEMIA: Chronic | ICD-10-CM

## 2018-10-19 DIAGNOSIS — D64.9 CHRONIC ANEMIA: Chronic | ICD-10-CM

## 2018-10-19 DIAGNOSIS — I48.0 PAROXYSMAL ATRIAL FIBRILLATION: Chronic | ICD-10-CM

## 2018-10-19 DIAGNOSIS — I70.0 CALCIFICATION OF AORTA: Chronic | ICD-10-CM

## 2018-10-19 DIAGNOSIS — I10 ESSENTIAL HYPERTENSION: Chronic | ICD-10-CM

## 2018-10-19 DIAGNOSIS — Z98.84 S/P GASTRIC BYPASS: Chronic | ICD-10-CM

## 2018-10-19 DIAGNOSIS — I25.5 ISCHEMIC CARDIOMYOPATHY: Chronic | ICD-10-CM

## 2018-10-19 DIAGNOSIS — Z95.0 PACEMAKER: Chronic | ICD-10-CM

## 2018-10-19 DIAGNOSIS — F32.9 MAJOR DEPRESSION, CHRONIC: Chronic | ICD-10-CM

## 2018-10-19 DIAGNOSIS — T45.1X5A CHEMOTHERAPY-INDUCED NEUROPATHY: Chronic | ICD-10-CM

## 2018-10-19 DIAGNOSIS — C85.83 LARGE CELL LYMPHOMA OF INTRA-ABDOMINAL LYMPH NODES: Chronic | ICD-10-CM

## 2018-10-19 DIAGNOSIS — M1A.09X0 IDIOPATHIC CHRONIC GOUT OF MULTIPLE SITES WITHOUT TOPHUS: Chronic | ICD-10-CM

## 2018-10-19 DIAGNOSIS — N18.30 STAGE 3 CHRONIC KIDNEY DISEASE: Chronic | ICD-10-CM

## 2018-10-19 DIAGNOSIS — I25.10 CORONARY ARTERY DISEASE INVOLVING NATIVE CORONARY ARTERY OF NATIVE HEART WITHOUT ANGINA PECTORIS: Primary | Chronic | ICD-10-CM

## 2018-10-19 DIAGNOSIS — Z86.79 HISTORY OF CONGESTIVE HEART FAILURE: ICD-10-CM

## 2018-10-19 DIAGNOSIS — G62.0 CHEMOTHERAPY-INDUCED NEUROPATHY: Chronic | ICD-10-CM

## 2018-10-19 DIAGNOSIS — E03.9 HYPOTHYROIDISM (ACQUIRED): Chronic | ICD-10-CM

## 2018-10-19 PROCEDURE — 1101F PT FALLS ASSESS-DOCD LE1/YR: CPT | Mod: CPTII,,, | Performed by: INTERNAL MEDICINE

## 2018-10-19 PROCEDURE — 99214 OFFICE O/P EST MOD 30 MIN: CPT | Mod: S$PBB,,, | Performed by: INTERNAL MEDICINE

## 2018-10-19 PROCEDURE — 99999 PR PBB SHADOW E&M-EST. PATIENT-LVL III: CPT | Mod: PBBFAC,,, | Performed by: INTERNAL MEDICINE

## 2018-10-19 PROCEDURE — 3078F DIAST BP <80 MM HG: CPT | Mod: CPTII,,, | Performed by: INTERNAL MEDICINE

## 2018-10-19 PROCEDURE — 99213 OFFICE O/P EST LOW 20 MIN: CPT | Mod: PBBFAC,PO | Performed by: INTERNAL MEDICINE

## 2018-10-19 PROCEDURE — 3074F SYST BP LT 130 MM HG: CPT | Mod: CPTII,,, | Performed by: INTERNAL MEDICINE

## 2018-10-19 NOTE — PROGRESS NOTES
Subjective:   Patient ID:  Violet Swenson is a 78 y.o. female who presents for follow up of Angioedema (hands and feet) and Coronary Artery Disease      HPI  A 77 yo female with cad pacer is here for f /u noted her feet swelling for the past 10 days they are not completely resolved with leg elevation. She is not eating any salt  No new extra nsaid she is on meloxicam 7.5 mg po daily.  She feels rt sided chest pain  No cough  No orthopnea no pnd  She is not more short of breath than usual . She is very careful with ehr salt intake. Not taken any extra lasix.   Past Medical History:   Diagnosis Date    Age-related osteoporosis without current pathological fracture 8/20/2018    Anemia     Anxiety     Arthritis     Cancer     lymphoma Large cell B    Chronic midline low back pain with right-sided sciatica 8/20/2018    Coronary artery disease     01/2015 LHC patent LCX. 50% stenosis in LAD and RCA.      Depression     Disorder of kidney and ureter     Encounter for blood transfusion     GERD (gastroesophageal reflux disease)     Gout, arthritis     Heart failure     Hx of psychiatric care     Hyperlipidemia     Hypertension     Hypothyroidism     Immune deficiency disorder     Lung nodule 2014    RML--stable    Obesity     Pacemaker     Metronic    Paroxysmal atrial fibrillation 3/15/2018    Pneumonia     Polyneuropathy     chemo induced    Psychiatric problem     Tobacco dependence     quit 1976    Trouble in sleeping        Past Surgical History:   Procedure Laterality Date    APPENDECTOMY  1966 approx    BIOPSY-BONE MARROW N/A 5/15/2017    Performed by [facility] OR    CARDIAC PACEMAKER PLACEMENT  01/22/2015    CHOLECYSTECTOMY  1993    incidental at time of gastric bypass    COLECTOMY-PARTIAL N/A 4/7/2017    [facility] by [facility]ton [facility] OR    COLON SURGERY Right 2017    hemicolectomy    COLONOSCOPY N/A 4/6/2017    Procedure: COLONOSCOPY;   "Surgeon: Tye Enamorado MD;  Location: [facility] ENDO;  Service: Endoscopy;  Laterality: N/A;    COLONOSCOPY N/A 2017    Performed by [facility]ado [facility] at [facility] ENDO    CORONARY ANGIOPLASTY  2014    CORONARY STENT PLACEMENT  2014    EXPLORATORY-LAPAROTOMY N/A 2017    [facility] at [facility] OR    GASTRIC BYPASS      with incidental choly    HEART CATH-BILATERAL N/A 2017    Performed by Nader Gil MD at [facility] CATH LAB    HERNIA REPAIR      HFYNYIVIT-ABIU-J-CATH N/A 5/15/2017    [facility] OR    JOINT REPLACEMENT Bilateral 2009    3 months apart    LYSIS-ADHESION N/A 2017    Performed [facility] at [facility] OR    REMOVAL-PORT-A-CATH N/A 2018    Performed by Nima Abdullahi MD at [facility] CATH LAB    TONSILLECTOMY         Social History     Tobacco Use    Smoking status: Former Smoker     Packs/day: 2.00     Years: 6.00     Pack years: 12.00     Last attempt to quit: 3/15/1976     Years since quittin.6    Smokeless tobacco: Never Used   Substance Use Topics    Alcohol use: No     Alcohol/week: 0.0 oz    Drug use: No       Family History   Problem Relation Age of Onset    Heart disease Mother     Hypertension Mother     Cataracts Mother     Stomach cancer Father         "ulcers that turned to cancer"    Cancer Father         stomach    Pancreatic cancer Sister     Cancer Sister         pancreatic    Leukemia Brother         "leukemia which led to intestinal cancer"    Cataracts Brother     Cancer Brother         leukemia then later stomach cancer    Drug abuse Son     Cancer Son         prostate cancer    Prostate cancer Son     COPD Daughter     Asthma Daughter     Hypertension Maternal Grandfather     Stroke Maternal Grandfather     Alcohol abuse Neg Hx     Diabetes Neg Hx     Mental retardation Neg Hx     Mental illness Neg Hx        Current Outpatient Medications   Medication Sig    allopurinol " (ZYLOPRIM) 300 MG tablet Take 1 tablet (300 mg total) by mouth once daily.    ALPRAZolam (XANAX) 0.25 MG tablet TAKE ONE TABLET BY MOUTH TWICE DAILY AS NEEDED FOR ANXIETY    ascorbic acid, vitamin C, (VITAMIN C) 100 MG tablet Take by mouth once daily.    aspirin (ECOTRIN) 81 MG EC tablet Take 81 mg by mouth nightly.     calcitRIOL (ROCALTROL) 0.25 MCG Cap Take 1 capsule (0.25 mcg total) by mouth every Mon, Wed, Fri.    clopidogrel (PLAVIX) 75 mg tablet TAKE ONE TABLET BY MOUTH EVERY DAY    COLCRYS 0.6 mg tablet Take 1 tablet (0.6 mg total) by mouth once daily.    diclofenac sodium 1 % Gel Apply 2 g topically once daily. Apply 2 g over painful joints once or twice a day.    ergocalciferol (ERGOCALCIFEROL) 50,000 unit Cap Take 1 capsule (50,000 Units total) by mouth every 7 days.    fluticasone (FLONASE) 50 mcg/actuation nasal spray 2 sprays by Each Nare route once daily. (Patient taking differently: 2 sprays by Each Nare route daily as needed. )    furosemide (LASIX) 20 MG tablet TAKE ONE TABLET BY MOUTH ONCE DAILY    gabapentin (NEURONTIN) 300 MG capsule Take 1 capsule (300 mg total) by mouth 3 (three) times daily.    iron-vitamin C 100-250 mg, ICAR-C, (ICAR-C) 100-250 mg Tab Take 1 tablet by mouth once daily.    isosorbide mononitrate (IMDUR) 30 MG 24 hr tablet TAKE ONE TABLET BY MOUTH AT BEDTIME    levothyroxine (SYNTHROID) 75 MCG tablet TAKE 1 TABLET(75 MCG) BY MOUTH BEFORE BREAKFAST    loratadine (CLARITIN) 10 mg tablet Take 1 tablet (10 mg total) by mouth once daily.    losartan (COZAAR) 25 MG tablet Take 1 tablet (25 mg total) by mouth once daily.    meloxicam (MOBIC) 7.5 MG tablet TAKE ONE TABLET BY MOUTH EVERY DAY AS NEEDED FOR PAIN    metoprolol tartrate (LOPRESSOR) 25 MG tablet TAKE ONE TABLET BY MOUTH TWICE DAILY    mirtazapine (REMERON SOL-TAB) 15 MG disintegrating tablet DISSOLVE ONE TABLET BY MOUTH NIGHTLY    multivitamin (ONE DAILY MULTIVITAMIN) per tablet Take 1 tablet by mouth  once daily.    ranitidine (ZANTAC) 150 MG capsule Take 150 mg by mouth nightly.     rosuvastatin (CRESTOR) 10 MG tablet TAKE ONE TABLET BY MOUTH EVERY EVENING    sertraline (ZOLOFT) 100 MG tablet TAKE 1.5 TABLETS BY MOUTH ONCE DAILY    sodium bicarbonate 650 MG tablet Take 1 tablet (650 mg total) by mouth 2 (two) times daily.    sodium,potassium,mag sulfates (SUPREP BOWEL PREP KIT) 17.5-3.13-1.6 gram SolR As directed for colonoscopy    ZINC ACETATE ORAL Take 250 mg by mouth once daily.      Current Facility-Administered Medications   Medication    denosumab (PROLIA) injection 60 mg     Current Outpatient Medications on File Prior to Visit   Medication Sig    allopurinol (ZYLOPRIM) 300 MG tablet Take 1 tablet (300 mg total) by mouth once daily.    ALPRAZolam (XANAX) 0.25 MG tablet TAKE ONE TABLET BY MOUTH TWICE DAILY AS NEEDED FOR ANXIETY    ascorbic acid, vitamin C, (VITAMIN C) 100 MG tablet Take by mouth once daily.    aspirin (ECOTRIN) 81 MG EC tablet Take 81 mg by mouth nightly.     calcitRIOL (ROCALTROL) 0.25 MCG Cap Take 1 capsule (0.25 mcg total) by mouth every Mon, Wed, Fri.    clopidogrel (PLAVIX) 75 mg tablet TAKE ONE TABLET BY MOUTH EVERY DAY    COLCRYS 0.6 mg tablet Take 1 tablet (0.6 mg total) by mouth once daily.    diclofenac sodium 1 % Gel Apply 2 g topically once daily. Apply 2 g over painful joints once or twice a day.    ergocalciferol (ERGOCALCIFEROL) 50,000 unit Cap Take 1 capsule (50,000 Units total) by mouth every 7 days.    fluticasone (FLONASE) 50 mcg/actuation nasal spray 2 sprays by Each Nare route once daily. (Patient taking differently: 2 sprays by Each Nare route daily as needed. )    furosemide (LASIX) 20 MG tablet TAKE ONE TABLET BY MOUTH ONCE DAILY    gabapentin (NEURONTIN) 300 MG capsule Take 1 capsule (300 mg total) by mouth 3 (three) times daily.    iron-vitamin C 100-250 mg, ICAR-C, (ICAR-C) 100-250 mg Tab Take 1 tablet by mouth once daily.    isosorbide  mononitrate (IMDUR) 30 MG 24 hr tablet TAKE ONE TABLET BY MOUTH AT BEDTIME    levothyroxine (SYNTHROID) 75 MCG tablet TAKE 1 TABLET(75 MCG) BY MOUTH BEFORE BREAKFAST    loratadine (CLARITIN) 10 mg tablet Take 1 tablet (10 mg total) by mouth once daily.    losartan (COZAAR) 25 MG tablet Take 1 tablet (25 mg total) by mouth once daily.    meloxicam (MOBIC) 7.5 MG tablet TAKE ONE TABLET BY MOUTH EVERY DAY AS NEEDED FOR PAIN    metoprolol tartrate (LOPRESSOR) 25 MG tablet TAKE ONE TABLET BY MOUTH TWICE DAILY    mirtazapine (REMERON SOL-TAB) 15 MG disintegrating tablet DISSOLVE ONE TABLET BY MOUTH NIGHTLY    multivitamin (ONE DAILY MULTIVITAMIN) per tablet Take 1 tablet by mouth once daily.    ranitidine (ZANTAC) 150 MG capsule Take 150 mg by mouth nightly.     rosuvastatin (CRESTOR) 10 MG tablet TAKE ONE TABLET BY MOUTH EVERY EVENING    sertraline (ZOLOFT) 100 MG tablet TAKE 1.5 TABLETS BY MOUTH ONCE DAILY    sodium bicarbonate 650 MG tablet Take 1 tablet (650 mg total) by mouth 2 (two) times daily.    sodium,potassium,mag sulfates (SUPREP BOWEL PREP KIT) 17.5-3.13-1.6 gram SolR As directed for colonoscopy    ZINC ACETATE ORAL Take 250 mg by mouth once daily.      Current Facility-Administered Medications on File Prior to Visit   Medication    denosumab (PROLIA) injection 60 mg       Review of Systems   Cardiovascular: Positive for leg swelling.       Objective:   Physical Exam   Constitutional: She is oriented to person, place, and time. She appears well-developed and well-nourished. She does not appear ill. No distress.   HENT:   Head: Normocephalic and atraumatic.   Eyes: EOM are normal. Pupils are equal, round, and reactive to light. No scleral icterus.   Neck: Normal range of motion. Neck supple. Normal carotid pulses, no hepatojugular reflux and no JVD present. Carotid bruit is not present. No tracheal deviation present. No thyromegaly present.   Cardiovascular: Normal rate, regular rhythm, normal  "heart sounds and normal pulses. Exam reveals no gallop and no friction rub.   No murmur heard.  Pulses:       Carotid pulses are 2+ on the right side, and 2+ on the left side.       Radial pulses are 2+ on the right side, and 2+ on the left side.        Femoral pulses are 2+ on the right side, and 2+ on the left side.       Popliteal pulses are 2+ on the right side, and 2+ on the left side.        Dorsalis pedis pulses are 2+ on the right side, and 2+ on the left side.        Posterior tibial pulses are 2+ on the right side, and 2+ on the left side.   Pulmonary/Chest: Effort normal and breath sounds normal. No respiratory distress. She has no wheezes. She has no rhonchi. She has no rales. She exhibits no tenderness.   Pacer site well healed.    Abdominal: Soft. Normal appearance, normal aorta and bowel sounds are normal. She exhibits no distension, no abdominal bruit, no ascites and no pulsatile midline mass. There is no hepatomegaly. There is no tenderness.   Musculoskeletal: She exhibits edema (trace bilateral.).        Right shoulder: She exhibits no deformity.   Neurological: She is alert and oriented to person, place, and time. She has normal strength. No cranial nerve deficit. Coordination normal.   Skin: Skin is warm and dry. No rash noted. She is not diaphoretic. No cyanosis or erythema. Nails show no clubbing.   Psychiatric: She has a normal mood and affect. Her speech is normal and behavior is normal.   Nursing note and vitals reviewed.    Vitals:    10/19/18 1541 10/19/18 1545   BP: 124/70 118/68   BP Location: Left arm Right arm   Patient Position: Sitting Sitting   BP Method: Medium (Manual) Medium (Manual)   Pulse: 68    Weight: 75.7 kg (166 lb 14.2 oz)    Height: 5' 4" (1.626 m)      Lab Results   Component Value Date    CHOL 92 (L) 07/19/2018    CHOL 138 04/05/2017    CHOL 114 (L) 11/07/2016     Lab Results   Component Value Date    HDL 55 07/19/2018    HDL 58 04/05/2017    HDL 51 11/07/2016     Lab " Results   Component Value Date    LDLCALC 26.4 (L) 07/19/2018    LDLCALC 62.8 (L) 04/05/2017    LDLCALC 41.8 (L) 11/07/2016     Lab Results   Component Value Date    TRIG 53 07/19/2018    TRIG 86 04/05/2017    TRIG 106 11/07/2016     Lab Results   Component Value Date    CHOLHDL 59.8 (H) 07/19/2018    CHOLHDL 42.0 04/05/2017    CHOLHDL 44.7 11/07/2016       Chemistry        Component Value Date/Time     08/20/2018 1116    K 4.8 08/20/2018 1116     (H) 08/20/2018 1116    CO2 22 (L) 08/20/2018 1116    BUN 18 08/20/2018 1116    CREATININE 1.1 08/20/2018 1116    GLU 87 08/20/2018 1116        Component Value Date/Time    CALCIUM 9.0 08/20/2018 1116    ALKPHOS 92 08/20/2018 1116    AST 66 (H) 08/20/2018 1116    ALT 71 (H) 08/20/2018 1116    BILITOT 0.4 08/20/2018 1116    ESTGFRAFRICA 56 (A) 08/20/2018 1116    EGFRNONAA 49 (A) 08/20/2018 1116          Lab Results   Component Value Date    TSH 3.227 06/18/2018     Lab Results   Component Value Date    INR 1.0 09/01/2017    INR 1.0 04/05/2017     Lab Results   Component Value Date    WBC 7.09 08/06/2018    HGB 10.4 (L) 08/06/2018    HCT 35.0 (L) 08/06/2018     (H) 08/06/2018     08/06/2018     BMP  Sodium   Date Value Ref Range Status   08/20/2018 142 136 - 145 mmol/L Final     Potassium   Date Value Ref Range Status   08/20/2018 4.8 3.5 - 5.1 mmol/L Final     Chloride   Date Value Ref Range Status   08/20/2018 114 (H) 95 - 110 mmol/L Final     CO2   Date Value Ref Range Status   08/20/2018 22 (L) 23 - 29 mmol/L Final     BUN, Bld   Date Value Ref Range Status   08/20/2018 18 8 - 23 mg/dL Final     Creatinine   Date Value Ref Range Status   08/20/2018 1.1 0.5 - 1.4 mg/dL Final     Calcium   Date Value Ref Range Status   08/20/2018 9.0 8.7 - 10.5 mg/dL Final     Anion Gap   Date Value Ref Range Status   08/20/2018 6 (L) 8 - 16 mmol/L Final     eGFR if    Date Value Ref Range Status   08/20/2018 56 (A) >60 mL/min/1.73 m^2 Final      eGFR if non    Date Value Ref Range Status   08/20/2018 49 (A) >60 mL/min/1.73 m^2 Final     Comment:     Calculation used to obtain the estimated glomerular filtration  rate (eGFR) is the CKD-EPI equation.        CrCl cannot be calculated (Patient's most recent lab result is older than the maximum 7 days allowed.).    Assessment:     1. Coronary artery disease : multiple vessels, s/p PTCA    2. Essential hypertension    3. Mixed hyperlipidemia    4. Hypothyroidism (acquired)    5. Major depression, chronic    6. Pacemaker    7. Idiopathic chronic gout of multiple sites without tophus    8. Ischemic cardiomyopathy    9. Chronic anemia    10. S/P gastric bypass    11. Calcification of aorta    12. 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 Stage 3 chronic kidney disease    14. Chemotherapy-induced neuropathy    15. Paroxysmal atrial fibrillation    16. History of congestive heart failure      Has what appears to be mild volume overload will ask her to get her lasix doubled through the wekend and see what response by Monday encouraged toe at [employer] for potassium and  orange juice.   Will get echo to assess lvf.  Questionable afib 30 seconds in September   Plan:   Watch salt nos ea slat   Extra lasix for 2 days   Get echo   F/u with mid level in chf clinic in 2 weeks.

## 2018-10-22 ENCOUNTER — CLINICAL SUPPORT (OUTPATIENT)
Dept: REHABILITATION | Facility: HOSPITAL | Age: 78
End: 2018-10-22
Attending: PHYSICAL MEDICINE & REHABILITATION
Payer: MEDICARE

## 2018-10-22 DIAGNOSIS — M54.41 CHRONIC RIGHT-SIDED LOW BACK PAIN WITH RIGHT-SIDED SCIATICA: Primary | ICD-10-CM

## 2018-10-22 DIAGNOSIS — M54.10 RADICULITIS: ICD-10-CM

## 2018-10-22 DIAGNOSIS — G89.29 CHRONIC RIGHT-SIDED LOW BACK PAIN WITH RIGHT-SIDED SCIATICA: Primary | ICD-10-CM

## 2018-10-22 DIAGNOSIS — M46.1 SACROILIITIS: ICD-10-CM

## 2018-10-22 PROCEDURE — 97014 ELECTRIC STIMULATION THERAPY: CPT

## 2018-10-22 PROCEDURE — 97110 THERAPEUTIC EXERCISES: CPT

## 2018-10-22 PROCEDURE — 97140 MANUAL THERAPY 1/> REGIONS: CPT

## 2018-10-22 NOTE — PROGRESS NOTES
"PHYSICAL THERAPY INITIAL OUTPATIENT EVALUATION    Referring Provider:  Dr. Lorraine Rizo    Diagnosis:       ICD-10-CM ICD-9-CM    1. 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 Sacroiliitis M46.1 720.2    3. Radiculitis M54.10 729.2        Orders:  Evaluate and Treat   Date : 10/22/2018    Visit # 8    SUBJECTIVE   Pt reports her lower back pain persists and she has had little lasting relief. Reports soreness more in the right PSIS / SIJ region. States she no longer has pain in her leg .   Continues to be very stiff in the am or after sitting for prolonged periods - once up and about she starts to rapidly feel better.        HISTORY:  78 y.o.  female being seen in clinic today for increased pain in her right low back pain with sharp, stabbing pain and pain into her leg.  States she bent over to  string on floor and when came up she felt a catch in lower back. Concentrated in lower right LSp. Her symptoms have persisted over the past 2 months. In the morning, she is stiff.  Moving and changing position provides some relief. Once bends is hard to straighten.  Feels more uncomfortable in static postures and has to frequently adjust posture .   No numbness / tingling . Pain will extends down to knee at times , is constant in lower back.  Comfortable lying on left side with right hip flexed " up and over ".   Meloxicam and gabapentin - with some relief . Seems to help neuropathy.      Onset: 2 months  Constant/ intermittent:  Constant - varies in intensity    Pain at worst: 10/10  Pain at best: 2-3/10  Pain currently:  4/10    Patient goal for PT: Pain relief     Occupation / daily activities: retired     OBJECTIVE :    Observation / Posture: Increased TSp kyphosis with runded shoulder and forward head posture , level iliac crest , level PSIS    Gait: ambulates with RW , does have cane as well. Balance issues - will drift laterally . At home " "usually no AD - will manage with hand held assist on wall , furniture as needed.     Lumbar AROM:   % Pain Present (Y/N)   FB 50 Yes   BB 75  no   RSB 25 Yes   LSB 66 no   RR 75  no   LR 75 Yes     Other AROM/PROM: decreased right hip IR to < 20 deg     Lower Extremity Strength:   WFL, No Myotomal weakness and weakness noted    Other strength: poor activation of trans abd / trunk extensors - weakness in both noted with lower limb elevation     Neuro/Sensation:     Reflexes :  Patellar- 2+     Achilles- 2+     Special Test:   SLR:   Negative  Slump: Negative   SI forward bend: Negative   Sacral / Pelvic positioning: level and symmetrical   Shear Testing:  negative    Joint Mobility:  Hypomobility / guarding mildly     Palpation:  Tender to palpation at right L5-S1 facet level into paraspinals and less so into glut / piriformis     TREATMENT PROVIDED:  -Manual Therapy:  SIJ distraction mobs and manip , MET for SIJ , PA mobs to mid and upper LSp , PA mobs to hip for anterior glide , taping to lower LSp / kinesio  ( 20 mins )   -Therapeutic Exercise: neural gliding , piriformis stretch 4 x 30 sec , PPT / APT x 30 , DKTC with ball x 3 mins, bilateral hip ER to green band x 30 , hip add ball squeeze x 30 , iso TA press downs with ball x 30 , seated rows with 20# 3 x 10 , palloff press 20# 30 x each   ( 30 mins )   -Modalities: MH to LSp x 10 mins   -Education on proper posture, body mechanics and condition    ASSESSMENT:  The patient is a 78 y.o. year old female who presents to physical therapy with complaints of 2 months of right lower back pain with pain extending to right leg.   No longer having pain into leg - more isolated in SIJ and with good response to SI joint manipulation - reports rlief . " like pressure has been taken off" .       Co-morbidities which may impact the plan of care and potentially impede the patient's progress in therapy include:   Diagnosis    Age-related osteoporosis without current " pathological fracture    Anemia    Anxiety    Arthritis    Cancer    Chronic midline low back pain with right-sided sciatica    Coronary artery disease    Depression    Disorder of kidney and ureter    Encounter for blood transfusion    GERD (gastroesophageal reflux disease)    Gout, arthritis    Heart failure    Hx of psychiatric care    Hyperlipidemia    Hypertension    Hypothyroidism    Immune deficiency disorder    Lung nodule    Obesity    Pacemaker    Paroxysmal atrial fibrillation    Pneumonia    Polyneuropathy    Psychiatric problem    Tobacco dependence       Trouble in sleeping     Patients CLINICAL PRESENTATION is STABLE .    Short Term Goals:    1. Pt to reports a subjective decrease in pain   MET   2. Pt to be instructed in a home exercise program / self care plan   MET      Long Term Goals:  1 .Pt to score  < 30%  on Oswestry Low back pain disability questionnaire  2. Pt to report minimal to no LBP with ADLS- dressing , grooming   3. Pt to have LSp ROM pain kareem and > 75% all directions   4. Pt to have negative neural mobility testing      PLAN:  Patient will benefit from physical therapy (2) x/week for (6) weeks including manual therapy, therapeutic exercise, functional activities, modalities, and patient education.    Thank you for this referral.    Nash Jones, PT, OCS

## 2018-10-23 ENCOUNTER — HOSPITAL ENCOUNTER (OUTPATIENT)
Dept: CARDIOLOGY | Facility: HOSPITAL | Age: 78
Discharge: HOME | End: 2018-10-23
Attending: INTERNAL MEDICINE
Payer: MEDICARE

## 2018-10-23 DIAGNOSIS — I25.10 CORONARY ARTERY DISEASE INVOLVING NATIVE CORONARY ARTERY OF NATIVE HEART WITHOUT ANGINA PECTORIS: Chronic | ICD-10-CM

## 2018-10-23 DIAGNOSIS — I25.5 ISCHEMIC CARDIOMYOPATHY: Chronic | ICD-10-CM

## 2018-10-23 LAB
DIASTOLIC DYSFUNCTION: NO
MITRAL VALVE MOBILITY: NORMAL
MITRAL VALVE REGURGITATION: NORMAL
RETIRED EF AND QEF - SEE NOTES: 50 (ref 55–65)

## 2018-10-23 PROCEDURE — 93306 TTE W/DOPPLER COMPLETE: CPT | Mod: 26,,, | Performed by: INTERNAL MEDICINE

## 2018-10-23 PROCEDURE — 93306 TTE W/DOPPLER COMPLETE: CPT

## 2018-10-24 ENCOUNTER — CLINICAL SUPPORT (OUTPATIENT)
Dept: REHABILITATION | Facility: HOSPITAL | Age: 78
End: 2018-10-24
Attending: PHYSICAL MEDICINE & REHABILITATION
Payer: MEDICARE

## 2018-10-24 ENCOUNTER — TELEPHONE (OUTPATIENT)
Dept: CARDIOLOGY | Facility: CLINIC | Age: 78
End: 2018-10-24

## 2018-10-24 DIAGNOSIS — G89.29 CHRONIC RIGHT-SIDED LOW BACK PAIN WITH RIGHT-SIDED SCIATICA: Primary | ICD-10-CM

## 2018-10-24 DIAGNOSIS — M46.1 SACROILIITIS: ICD-10-CM

## 2018-10-24 DIAGNOSIS — F41.8 SITUATIONAL ANXIETY: ICD-10-CM

## 2018-10-24 DIAGNOSIS — M54.41 CHRONIC RIGHT-SIDED LOW BACK PAIN WITH RIGHT-SIDED SCIATICA: Primary | ICD-10-CM

## 2018-10-24 DIAGNOSIS — M54.10 RADICULITIS: ICD-10-CM

## 2018-10-24 PROCEDURE — 97110 THERAPEUTIC EXERCISES: CPT

## 2018-10-24 PROCEDURE — 97140 MANUAL THERAPY 1/> REGIONS: CPT

## 2018-10-24 NOTE — PROGRESS NOTES
"PHYSICAL THERAPY INITIAL OUTPATIENT EVALUATION    Referring Provider:  Dr. Lorraine Rizo    Diagnosis:       ICD-10-CM ICD-9-CM    1. 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 Sacroiliitis M46.1 720.2    3. Radiculitis M54.10 729.2        Orders:  Evaluate and Treat   Date : 10/24/2018    Visit # 9    SUBJECTIVE   Pt reports feeling better since last session. Notices less pain when standing up and feels she is standing straighter.        HISTORY:  78 y.o.  female being seen in clinic today for increased pain in her right low back pain with sharp, stabbing pain and pain into her leg.  States she bent over to  string on floor and when came up she felt a catch in lower back. Concentrated in lower right LSp. Her symptoms have persisted over the past 2 months. In the morning, she is stiff.  Moving and changing position provides some relief. Once bends is hard to straighten.  Feels more uncomfortable in static postures and has to frequently adjust posture .   No numbness / tingling . Pain will extends down to knee at times , is constant in lower back.  Comfortable lying on left side with right hip flexed " up and over ".   Meloxicam and gabapentin - with some relief . Seems to help neuropathy.      Onset: 2 months  Constant/ intermittent:  Constant - varies in intensity    Pain at worst: 10/10  Pain at best: 2-3/10  Pain currently:  4/10    Patient goal for PT: Pain relief     Occupation / daily activities: retired     OBJECTIVE :    Observation / Posture: Increased TSp kyphosis with runded shoulder and forward head posture , level iliac crest , level PSIS    Gait: ambulates with RW , does have cane as well. Balance issues - will drift laterally . At home usually no AD - will manage with hand held assist on wall , furniture as needed.     Lumbar AROM:   % Pain Present (Y/N)   FB 50 Yes   BB 75  no   RSB 25 Yes   LSB 66 no   RR 75  no   LR 75 Yes "     Other AROM/PROM: decreased right hip IR to < 20 deg     Lower Extremity Strength:   WFL, No Myotomal weakness and weakness noted    Other strength: poor activation of trans abd / trunk extensors - weakness in both noted with lower limb elevation     Neuro/Sensation:     Reflexes :  Patellar- 2+     Achilles- 2+     Special Test:   SLR:   Negative  Slump: Negative   SI forward bend: Negative   Sacral / Pelvic positioning: level and symmetrical   Shear Testing:  negative    Joint Mobility:  Hypomobility / guarding mildly     Palpation:  Tender to palpation at right L5-S1 facet level into paraspinals and less so into glut / piriformis     TREATMENT PROVIDED:  -Manual Therapy:  SIJ distraction mobs and manip , MET for SIJ , PA mobs to mid and upper LSp ,  ( 15 mins )   -Therapeutic Exercise: neural gliding , piriformis stretch 4 x 30 sec , PPT / APT x 30 , DKTC with ball x 3 mins, bilateral hip ER to green band x 30 , hip add ball squeeze x 30 , iso TA press downs with ball ( not today)  , seated rows with 20# 3 x 10 , palloff press 20# 30 x each , prone l/e raise / multifidus   ( 30 mins )   -Modalities: MH to LSp x 10 mins   -Education on proper posture, body mechanics and condition    ASSESSMENT:  The patient is a 78 y.o. year old female who presents to physical therapy with complaints of 2 months of right lower back pain with pain extending to right leg.   Reports improvement from last visit  And is moving notably better with less pain .     Co-morbidities which may impact the plan of care and potentially impede the patient's progress in therapy include:   Diagnosis    Age-related osteoporosis without current pathological fracture    Anemia    Anxiety    Arthritis    Cancer    Chronic midline low back pain with right-sided sciatica    Coronary artery disease    Depression    Disorder of kidney and ureter    Encounter for blood transfusion    GERD (gastroesophageal reflux disease)    Gout, arthritis     Heart failure    Hx of psychiatric care    Hyperlipidemia    Hypertension    Hypothyroidism    Immune deficiency disorder    Lung nodule    Obesity    Pacemaker    Paroxysmal atrial fibrillation    Pneumonia    Polyneuropathy    Psychiatric problem    Tobacco dependence       Trouble in sleeping     Patients CLINICAL PRESENTATION is STABLE .    Short Term Goals:    1. Pt to reports a subjective decrease in pain   MET   2. Pt to be instructed in a home exercise program / self care plan   MET      Long Term Goals:  1 .Pt to score  < 30%  on Oswestry Low back pain disability questionnaire  2. Pt to report minimal to no LBP with ADLS- dressing , grooming   3. Pt to have LSp ROM pain kareem and > 75% all directions   4. Pt to have negative neural mobility testing      PLAN:  Patient will benefit from physical therapy (2) x/week for (6) weeks including manual therapy, therapeutic exercise, functional activities, modalities, and patient education.    Thank you for this referral.    Nash Jones, PT, OCS

## 2018-10-25 ENCOUNTER — TELEPHONE (OUTPATIENT)
Dept: INFUSION THERAPY | Facility: HOSPITAL | Age: 78
End: 2018-10-25

## 2018-10-25 DIAGNOSIS — C85.83 LARGE CELL LYMPHOMA OF INTRA-ABDOMINAL LYMPH NODES: ICD-10-CM

## 2018-10-25 DIAGNOSIS — F51.01 PRIMARY INSOMNIA: ICD-10-CM

## 2018-10-25 DIAGNOSIS — G62.9 NEUROPATHY: ICD-10-CM

## 2018-10-25 NOTE — TELEPHONE ENCOUNTER
Patient states she is only taking one pill a day, every morning. Patient states her anxiety is about the same and she does have plenty of pills at this time. Patient states a refill is not needed. Advised to call the office as needed, patient verbalized understanding.

## 2018-10-25 NOTE — TELEPHONE ENCOUNTER
----- Message from Traci Chao sent at 10/25/2018 11:09 AM CDT -----  Contact: pt  States she needs to speak to Ms Dalton regarding her PET Scan and if she needs to do any lab work before her appt. Please call pt at [phone]. Thank you

## 2018-10-25 NOTE — TELEPHONE ENCOUNTER
Patient said that the doctor wanted her to have a pet scan.  She said she is starting to have in her stomach like with she had cancer.  I advised her that Dr Matson did not indicate that he wanted her to have a Pet scan in his previous note.  I advised her that Dr Matson is only coming in on Wednesdays.  I offered her an appointment with another physician but the patient stated that she did not want to see anyone else.  I told her I would check with Dr Matson on Wednesday and get back with her.

## 2018-10-26 RX ORDER — ALPRAZOLAM 0.25 MG/1
TABLET ORAL
Qty: 60 TABLET | OUTPATIENT
Start: 2018-10-26

## 2018-10-29 ENCOUNTER — CLINICAL SUPPORT (OUTPATIENT)
Dept: REHABILITATION | Facility: HOSPITAL | Age: 78
End: 2018-10-29
Attending: PHYSICAL MEDICINE & REHABILITATION
Payer: MEDICARE

## 2018-10-29 ENCOUNTER — TELEPHONE (OUTPATIENT)
Dept: CARDIOLOGY | Facility: CLINIC | Age: 78
End: 2018-10-29

## 2018-10-29 DIAGNOSIS — M54.41 CHRONIC RIGHT-SIDED LOW BACK PAIN WITH RIGHT-SIDED SCIATICA: Primary | ICD-10-CM

## 2018-10-29 DIAGNOSIS — M54.10 RADICULITIS: ICD-10-CM

## 2018-10-29 DIAGNOSIS — G89.29 CHRONIC RIGHT-SIDED LOW BACK PAIN WITH RIGHT-SIDED SCIATICA: Primary | ICD-10-CM

## 2018-10-29 DIAGNOSIS — M46.1 SACROILIITIS: ICD-10-CM

## 2018-10-29 PROCEDURE — 97014 ELECTRIC STIMULATION THERAPY: CPT | Mod: HCNC

## 2018-10-29 PROCEDURE — 97110 THERAPEUTIC EXERCISES: CPT | Mod: HCNC

## 2018-10-29 PROCEDURE — 97140 MANUAL THERAPY 1/> REGIONS: CPT | Mod: HCNC

## 2018-10-29 RX ORDER — GABAPENTIN 100 MG/1
CAPSULE ORAL
Qty: 180 CAPSULE | Refills: 2 | OUTPATIENT
Start: 2018-10-29

## 2018-10-29 NOTE — TELEPHONE ENCOUNTER
----- Message from Tejal Stanleyite sent at 10/29/2018  9:07 AM CDT -----  Contact: Pt  Pt called and stated she needs her test results. She can be reached at [phone].    Thanks,  TF

## 2018-10-30 RX ORDER — MIRTAZAPINE 15 MG/1
TABLET, ORALLY DISINTEGRATING ORAL
Qty: 30 TABLET | Refills: 2 | Status: SHIPPED | OUTPATIENT
Start: 2018-10-30 | End: 2019-01-29 | Stop reason: SDUPTHER

## 2018-10-31 ENCOUNTER — TELEPHONE (OUTPATIENT)
Dept: INFUSION THERAPY | Facility: HOSPITAL | Age: 78
End: 2018-10-31

## 2018-10-31 ENCOUNTER — CLINICAL SUPPORT (OUTPATIENT)
Dept: REHABILITATION | Facility: HOSPITAL | Age: 78
End: 2018-10-31
Attending: PHYSICAL MEDICINE & REHABILITATION
Payer: MEDICARE

## 2018-10-31 DIAGNOSIS — G89.29 CHRONIC RIGHT-SIDED LOW BACK PAIN WITH RIGHT-SIDED SCIATICA: Primary | ICD-10-CM

## 2018-10-31 DIAGNOSIS — M54.10 RADICULITIS: ICD-10-CM

## 2018-10-31 DIAGNOSIS — M46.1 SACROILIITIS: ICD-10-CM

## 2018-10-31 DIAGNOSIS — M54.41 CHRONIC RIGHT-SIDED LOW BACK PAIN WITH RIGHT-SIDED SCIATICA: Primary | ICD-10-CM

## 2018-10-31 PROCEDURE — 97140 MANUAL THERAPY 1/> REGIONS: CPT | Mod: HCNC

## 2018-10-31 PROCEDURE — 97110 THERAPEUTIC EXERCISES: CPT | Mod: HCNC

## 2018-10-31 NOTE — TELEPHONE ENCOUNTER
----- Message from Da Matson MD sent at 10/31/2018 12:55 PM CDT -----  Does she need a pet scan before her next visit?    No, she had one in 7/2018. One is not due yet  Dr matson

## 2018-10-31 NOTE — PROGRESS NOTES
"PHYSICAL THERAPY INITIAL OUTPATIENT EVALUATION    Referring Provider:  Dr. Lorraine Rizo    Diagnosis:       ICD-10-CM ICD-9-CM    1. 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 Sacroiliitis M46.1 720.2    3. Radiculitis M54.10 729.2        Orders:  Evaluate and Treat   Date : 10/31/2018    Visit # 9    SUBJECTIVE   Pt reports she feels better in flexed postures and worse when straight - for example lying down is painful unless bends knees. Sleeping is disturbed secondary to pain .         HISTORY:  78 y.o.  female being seen in clinic today for increased pain in her right low back pain with sharp, stabbing pain and pain into her leg.  States she bent over to  string on floor and when came up she felt a catch in lower back. Concentrated in lower right LSp. Her symptoms have persisted over the past 2 months. In the morning, she is stiff.  Moving and changing position provides some relief. Once bends is hard to straighten.  Feels more uncomfortable in static postures and has to frequently adjust posture .   No numbness / tingling . Pain will extends down to knee at times , is constant in lower back.  Comfortable lying on left side with right hip flexed " up and over ".   Meloxicam and gabapentin - with some relief . Seems to help neuropathy.      Onset: 2 months  Constant/ intermittent:  Constant - varies in intensity    Pain at worst: 10/10  Pain at best: 2-3/10  Pain currently:  4/10    Patient goal for PT: Pain relief     Occupation / daily activities: retired     OBJECTIVE :    Observation / Posture: Increased TSp kyphosis with runded shoulder and forward head posture , level iliac crest , level PSIS    Gait: ambulates with RW , does have cane as well. Balance issues - will drift laterally . At home usually no AD - will manage with hand held assist on wall , furniture as needed.     Lumbar AROM:   % Pain Present (Y/N)   FB 50 Yes   BB 75  no " "  RSB 25 Yes   LSB 66 no   RR 75  no   LR 75 Yes     Other AROM/PROM: decreased right hip IR to < 20 deg     Lower Extremity Strength:   WFL, No Myotomal weakness and weakness noted    Other strength: poor activation of trans abd / trunk extensors - weakness in both noted with lower limb elevation     Neuro/Sensation:     Reflexes :  Patellar- 2+     Achilles- 2+     Special Test:   SLR:   Negative  Slump: Negative   SI forward bend: Negative   Sacral / Pelvic positioning: level and symmetrical   Shear Testing:  negative    Joint Mobility:  Hypomobility / guarding mildly     Palpation:  Tender to palpation at right L5-S1 facet level into paraspinals and less so into glut / piriformis     TREATMENT PROVIDED:  -Manual Therapy:  Side ly opening / gapping mobs to lower right LSp facets , STM "breaking bread" to paraspinals .  ( 10 mins )   -Therapeutic Exercise: neural gliding , piriformis stretch 4 x 30 sec , PPT / APT x 30 , DKTC with ball x 3 mins, bilateral hip ER to green band x 30 , hip flex to band x 30 , iso TA press downs with ball ( not today)  , ball rolouts forward and to left x 20 , positional distraction x 10 mins ( 30 mins )   -Modalities: MH to LSp x 10 mins   -Education on proper posture, body mechanics and condition    ASSESSMENT:  The patient is a 78 y.o. year old female who presents to physical therapy with complaints of 2 months of right lower back pain with pain extending to right leg.   Pain is persistent and difficult to obtain a pattern of limitation or provocation. Feels good in positional distraction , however pain on return to sitting. May have been too long in positional distraction. Symptoms have a directional preference for flexion and are stenotic and facet in nature.     Co-morbidities which may impact the plan of care and potentially impede the patient's progress in therapy include:   Diagnosis    Age-related osteoporosis without current pathological fracture    Anemia    Anxiety    " Arthritis    Cancer    Chronic midline low back pain with right-sided sciatica    Coronary artery disease    Depression    Disorder of kidney and ureter    Encounter for blood transfusion    GERD (gastroesophageal reflux disease)    Gout, arthritis    Heart failure    Hx of psychiatric care    Hyperlipidemia    Hypertension    Hypothyroidism    Immune deficiency disorder    Lung nodule    Obesity    Pacemaker    Paroxysmal atrial fibrillation    Pneumonia    Polyneuropathy    Psychiatric problem    Tobacco dependence       Trouble in sleeping     Patients CLINICAL PRESENTATION is STABLE .    Short Term Goals:    1. Pt to reports a subjective decrease in pain   MET   2. Pt to be instructed in a home exercise program / self care plan   MET      Long Term Goals:  1 .Pt to score  < 30%  on Oswestry Low back pain disability questionnaire  2. Pt to report minimal to no LBP with ADLS- dressing , grooming   3. Pt to have LSp ROM pain kareem and > 75% all directions   4. Pt to have negative neural mobility testing      PLAN:  Patient will benefit from physical therapy (2) x/week for (6) weeks including manual therapy, therapeutic exercise, functional activities, modalities, and patient education.    Thank you for this referral.    Nash Jones, PT, OCS

## 2018-11-01 ENCOUNTER — TELEPHONE (OUTPATIENT)
Dept: CARDIOLOGY | Facility: CLINIC | Age: 78
End: 2018-11-01

## 2018-11-01 NOTE — TELEPHONE ENCOUNTER
Please advise pt may hold Plavix 5 days prior to endoscopy procedure. Procedure is scheduled on 11/28/18.

## 2018-11-01 NOTE — PROGRESS NOTES
"PHYSICAL THERAPY INITIAL OUTPATIENT EVALUATION    Referring Provider:  Dr. Lorraine Rizo    Diagnosis:       ICD-10-CM ICD-9-CM    1. 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 Sacroiliitis M46.1 720.2    3. Radiculitis M54.10 729.2        Orders:  Evaluate and Treat   Date : 11/01/2018    Visit # 9    SUBJECTIVE   Pt reports continued aching . Has had some mild improvements but then aching returns - fluctuates . Is still having notable difficulty with sleep.     HISTORY:  78 y.o.  female being seen in clinic today for increased pain in her right low back pain with sharp, stabbing pain and pain into her leg.  States she bent over to  string on floor and when came up she felt a catch in lower back. Concentrated in lower right LSp. Her symptoms have persisted over the past 2 months. In the morning, she is stiff.  Moving and changing position provides some relief. Once bends is hard to straighten.  Feels more uncomfortable in static postures and has to frequently adjust posture .   No numbness / tingling . Pain will extends down to knee at times , is constant in lower back.  Comfortable lying on left side with right hip flexed " up and over ".   Meloxicam and gabapentin - with some relief . Seems to help neuropathy.      Onset: 2 months  Constant/ intermittent:  Constant - varies in intensity    Pain at worst: 10/10  Pain at best: 2-3/10  Pain currently:  4/10    Patient goal for PT: Pain relief     Occupation / daily activities: retired     OBJECTIVE :    Observation / Posture: Increased TSp kyphosis with runded shoulder and forward head posture , level iliac crest , level PSIS    Gait: ambulates with RW , does have cane as well. Balance issues - will drift laterally . At home usually no AD - will manage with hand held assist on wall , furniture as needed.     Lumbar AROM:   % Pain Present (Y/N)   FB 50 Yes   BB 75  no   RSB 25 Yes   LSB 66 no "   RR 75  no   LR 75 Yes     Other AROM/PROM: decreased right hip IR to < 20 deg     Lower Extremity Strength:   WFL, No Myotomal weakness and weakness noted    Other strength: poor activation of trans abd / trunk extensors - weakness in both noted with lower limb elevation     Neuro/Sensation:     Reflexes :  Patellar- 2+     Achilles- 2+     Special Test:   SLR:   Negative  Slump: Negative   SI forward bend: Negative   Sacral / Pelvic positioning: level and symmetrical   Shear Testing:  negative    Joint Mobility:  Hypomobility / guarding mildly     Palpation:  Tender to palpation at right L5-S1 facet level into paraspinals and less so into glut / piriformis     TREATMENT PROVIDED:  -Manual Therapy:  PA mobs to mid and upper LSp , hip long axis distraction  ( 10 mins )   -Therapeutic Exercise: neural gliding , piriformis stretch 4 x 30 sec , PPT / APT x 30 , DKTC with ball x 3 mins, bilateral hip ER to green band x 30 , hip add ball squeeze x 30 , iso TA press downs with ball ( not today)  , seated rows with 20# 3 x 10 , palloff press 20# 30 x each , prone l/e raise / multifidus   ( 30 mins )   -Modalities: MH to LSp x 10 mins   -Education on proper posture, body mechanics and condition    ASSESSMENT:  The patient is a 78 y.o. year old female who presents to physical therapy with complaints of 2 months of right lower back pain with pain extending to right leg.   Hip distraction mobs tolerated well. No directional preference with generalized pain . Difficult to find specific movement to provoke symptoms.     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    Short Term Goals:    1. Pt to reports a subjective decrease in pain   MET   2. Pt to be instructed in a home exercise program / self care plan   MET      Long Term Goals:  1 .Pt to score  < 30%  on Oswestry Low back pain disability questionnaire  2. Pt to report minimal to no LBP with ADLS- dressing , grooming   3. Pt to have LSp ROM pain kareem and > 75% all directions   4. Pt to have negative neural mobility testing      PLAN:  Patient will benefit from physical therapy (2) x/week for (6) weeks including manual therapy, therapeutic exercise, functional activities, modalities, and patient education.    Thank you for this referral.    Nash Jones, PT, OCS

## 2018-11-02 ENCOUNTER — OFFICE VISIT (OUTPATIENT)
Dept: CARDIOLOGY | Facility: CLINIC | Age: 78
End: 2018-11-02
Payer: MEDICARE

## 2018-11-02 VITALS
WEIGHT: 159.81 LBS | BODY MASS INDEX: 27.28 KG/M2 | HEIGHT: 64 IN | DIASTOLIC BLOOD PRESSURE: 80 MMHG | SYSTOLIC BLOOD PRESSURE: 140 MMHG | HEART RATE: 74 BPM

## 2018-11-02 DIAGNOSIS — I25.5 ISCHEMIC CARDIOMYOPATHY: Primary | Chronic | ICD-10-CM

## 2018-11-02 DIAGNOSIS — I10 ESSENTIAL HYPERTENSION: Chronic | ICD-10-CM

## 2018-11-02 DIAGNOSIS — E78.2 MIXED HYPERLIPIDEMIA: Chronic | ICD-10-CM

## 2018-11-02 DIAGNOSIS — Z95.0 PACEMAKER: Chronic | ICD-10-CM

## 2018-11-02 DIAGNOSIS — N18.30 STAGE 3 CHRONIC KIDNEY DISEASE: Chronic | ICD-10-CM

## 2018-11-02 DIAGNOSIS — I25.10 CORONARY ARTERY DISEASE INVOLVING NATIVE CORONARY ARTERY OF NATIVE HEART WITHOUT ANGINA PECTORIS: Chronic | ICD-10-CM

## 2018-11-02 DIAGNOSIS — I70.0 CALCIFICATION OF AORTA: Chronic | ICD-10-CM

## 2018-11-02 DIAGNOSIS — D64.9 CHRONIC ANEMIA: Chronic | ICD-10-CM

## 2018-11-02 DIAGNOSIS — R94.2 DIFFUSION CAPACITY OF LUNG (DL), DECREASED: Chronic | ICD-10-CM

## 2018-11-02 DIAGNOSIS — I48.0 PAROXYSMAL ATRIAL FIBRILLATION: Chronic | ICD-10-CM

## 2018-11-02 PROCEDURE — 3079F DIAST BP 80-89 MM HG: CPT | Mod: CPTII,HCNC,S$GLB, | Performed by: NURSE PRACTITIONER

## 2018-11-02 PROCEDURE — 99215 OFFICE O/P EST HI 40 MIN: CPT | Mod: PBBFAC,HCNC | Performed by: NURSE PRACTITIONER

## 2018-11-02 PROCEDURE — 3077F SYST BP >= 140 MM HG: CPT | Mod: CPTII,HCNC,S$GLB, | Performed by: NURSE PRACTITIONER

## 2018-11-02 PROCEDURE — 99214 OFFICE O/P EST MOD 30 MIN: CPT | Mod: HCNC,S$GLB,, | Performed by: NURSE PRACTITIONER

## 2018-11-02 PROCEDURE — 1101F PT FALLS ASSESS-DOCD LE1/YR: CPT | Mod: CPTII,HCNC,S$GLB, | Performed by: NURSE PRACTITIONER

## 2018-11-02 PROCEDURE — 99999 PR PBB SHADOW E&M-EST. PATIENT-LVL V: CPT | Mod: PBBFAC,HCNC,, | Performed by: NURSE PRACTITIONER

## 2018-11-02 RX ORDER — FUROSEMIDE 40 MG/1
TABLET ORAL
Qty: 40 TABLET | Refills: 6 | Status: SHIPPED | OUTPATIENT
Start: 2018-11-02 | End: 2019-02-06

## 2018-11-02 RX ORDER — POTASSIUM CHLORIDE 20 MEQ/1
20 TABLET, EXTENDED RELEASE ORAL DAILY
Qty: 30 TABLET | Refills: 6 | Status: SHIPPED | OUTPATIENT
Start: 2018-11-02 | End: 2018-12-19

## 2018-11-02 NOTE — PROGRESS NOTES
Subjective:   Patient ID:  Violet Swenson is a 78 y.o. female who presents for follow up of Leg Swelling      HPI  Patient presents to clinic for recheck of lower extremity edema. Patient PMH of CAD , medtronic PPM (CHB), Lymphoma Large Cell B, HTN, HLP, hypothyroid, PAF, chemo induced neuropathy.   Cath in Sept 2017 that showed single vessel CAD (large patent stent to OM).   Seen by Dr. Gil a few weeks ago and reported lower extremity edema that resolved with elevated legs. Patient takes Meloxicam 7.5mg daily.   Given instructions to increase her Lasix to 40mg daily x 2 days. Edema improved, but returned shortly after. Echo repeated to reassess LVF. Echo showed LVEF 50-55% with no WMA.   Doing well with limiting sodium intake. Seasons her food with Mrs. Oreilly.   Patient scheduled for EGD on 11/28/18 with Dr. Arthur. Has been given clearance by Dr. Gil to hold Plavix for 5 days prior to procedure.     Past Medical History:   Diagnosis Date    Age-related osteoporosis without current pathological fracture 8/20/2018    Anemia     Anxiety     Arthritis     Cancer     lymphoma Large cell B    Chronic midline low back pain with right-sided sciatica 8/20/2018    Coronary artery disease     01/2015 LHC patent LCX. 50% stenosis in LAD and RCA.      Depression     Disorder of kidney and ureter     Encounter for blood transfusion     GERD (gastroesophageal reflux disease)     Gout, arthritis     Heart failure     Hx of psychiatric care     Hyperlipidemia     Hypertension     Hypothyroidism     Immune deficiency disorder     Lung nodule 2014    RML--stable    Obesity     Pacemaker     Metronic    Paroxysmal atrial fibrillation 3/15/2018    Pneumonia     Polyneuropathy     chemo induced    Psychiatric problem     Tobacco dependence     quit 1976    Trouble in sleeping        Past Surgical History:   Procedure Laterality Date    APPENDECTOMY  1966 approx    BIOPSY-BONE MARROW N/A  "5/15/2017    [facility]formed by [facility]aux [facility] at [facility] OR    CARDIAC PACEMAKER PLACEMENT  2015    CHOLECYSTECTOMY  1993    incidental at time of gastric bypass    COLECTOMY-PARTIAL N/A 2017    Performed by Orlando [facility]oulton [facility] at [facility] OR    COLON SURGERY Right 2017    hemicolectomy    COLONOSCOPY N/A 2017    Procedure: COLONOSCOPY;  Surgeon: Tye Enamorado MD;  Location: [facility];  Service: Endoscopy;  Laterality: N/A;    COLONOSCOPY N/A 2017    Performed by Tye [facility]amorado MD at [facility] ENDO    CORONARY ANGIOPLASTY  2014    CORONARY STENT PLACEMENT  2014    EXPLORATORY-LAPAROTOMY N/A 2017    [facility] at [facility] OR    GASTRIC BYPASS  1993    with incidental choly    HEART CATH-BILATERAL N/A 2017    Performed by Nader Gil MD at [facility] CATH LAB    HERNIA REPAIR      DZFASBERZ-LEIV-C-CATH N/A 5/15/2017    Per[facility]oulton [facility] at [facility] OR    JOINT REPLACEMENT Bilateral 2009    3 months apart    LYSIS-ADHESION N/A 2017    [facility]ton [facility] at [facility] OR    REMOVAL-PORT-A-CATH N/A 2018    Performed by Nima Abdullahi MD at [facility] CATH LAB    TONSILLECTOMY         Social History     Tobacco Use    Smoking status: Former Smoker     Packs/day: 2.00     Years: 6.00     Pack years: 12.00     Last attempt to quit: 3/15/1976     Years since quittin.6    Smokeless tobacco: Never Used   Substance Use Topics    Alcohol use: No     Alcohol/week: 0.0 oz    Drug use: No       Family History   Problem Relation Age of Onset    Heart disease Mother     Hypertension Mother     Cataracts Mother     Stomach cancer Father         "ulcers that turned to cancer"    Cancer Father         stomach    Pancreatic cancer Sister     Cancer Sister         pancreatic    Leukemia Brother         "leukemia which led to intestinal cancer"    Cataracts Brother     Cancer Brother         leukemia then later stomach cancer    Drug abuse Son  "    Cancer Son         prostate cancer    Prostate cancer Son     COPD Daughter     Asthma Daughter     Hypertension Maternal Grandfather     Stroke Maternal Grandfather     Alcohol abuse Neg Hx     Diabetes Neg Hx     Mental retardation Neg Hx     Mental illness Neg Hx        Current Outpatient Medications   Medication Sig    allopurinol (ZYLOPRIM) 300 MG tablet Take 1 tablet (300 mg total) by mouth once daily.    ALPRAZolam (XANAX) 0.25 MG tablet TAKE ONE TABLET BY MOUTH TWICE DAILY AS NEEDED FOR ANXIETY    ascorbic acid, vitamin C, (VITAMIN C) 100 MG tablet Take by mouth once daily.    aspirin (ECOTRIN) 81 MG EC tablet Take 81 mg by mouth nightly.     calcitRIOL (ROCALTROL) 0.25 MCG Cap Take 1 capsule (0.25 mcg total) by mouth every Mon, Wed, Fri.    clopidogrel (PLAVIX) 75 mg tablet TAKE ONE TABLET BY MOUTH EVERY DAY    COLCRYS 0.6 mg tablet Take 1 tablet (0.6 mg total) by mouth once daily.    diclofenac sodium 1 % Gel Apply 2 g topically once daily. Apply 2 g over painful joints once or twice a day.    ergocalciferol (ERGOCALCIFEROL) 50,000 unit Cap Take 1 capsule (50,000 Units total) by mouth every 7 days.    fluticasone (FLONASE) 50 mcg/actuation nasal spray 2 sprays by Each Nare route once daily. (Patient taking differently: 2 sprays by Each Nare route daily as needed. )    furosemide (LASIX) 20 MG tablet TAKE ONE TABLET BY MOUTH ONCE DAILY    gabapentin (NEURONTIN) 300 MG capsule Take 1 capsule (300 mg total) by mouth 3 (three) times daily.    iron-vitamin C 100-250 mg, ICAR-C, (ICAR-C) 100-250 mg Tab Take 1 tablet by mouth once daily.    isosorbide mononitrate (IMDUR) 30 MG 24 hr tablet TAKE ONE TABLET BY MOUTH AT BEDTIME    levothyroxine (SYNTHROID) 75 MCG tablet TAKE 1 TABLET(75 MCG) BY MOUTH BEFORE BREAKFAST    loratadine (CLARITIN) 10 mg tablet Take 1 tablet (10 mg total) by mouth once daily.    losartan (COZAAR) 25 MG tablet Take 1 tablet (25 mg total) by mouth once daily.     meloxicam (MOBIC) 7.5 MG tablet TAKE ONE TABLET BY MOUTH EVERY DAY AS NEEDED FOR PAIN    metoprolol tartrate (LOPRESSOR) 25 MG tablet TAKE ONE TABLET BY MOUTH TWICE DAILY    mirtazapine (REMERON SOL-TAB) 15 MG disintegrating tablet DISSOLVE ONE TABLET BY MOUTH NIGHTLY    multivitamin (ONE DAILY MULTIVITAMIN) per tablet Take 1 tablet by mouth once daily.    ranitidine (ZANTAC) 150 MG capsule Take 150 mg by mouth nightly.     rosuvastatin (CRESTOR) 10 MG tablet TAKE ONE TABLET BY MOUTH EVERY EVENING    sertraline (ZOLOFT) 100 MG tablet TAKE 1.5 TABLETS BY MOUTH ONCE DAILY    sodium bicarbonate 650 MG tablet Take 1 tablet (650 mg total) by mouth 2 (two) times daily.    ZINC ACETATE ORAL Take 250 mg by mouth once daily.     sodium,potassium,mag sulfates (SUPREP BOWEL PREP KIT) 17.5-3.13-1.6 gram SolR As directed for colonoscopy     Current Facility-Administered Medications   Medication    denosumab (PROLIA) injection 60 mg     Current Outpatient Medications on File Prior to Visit   Medication Sig    allopurinol (ZYLOPRIM) 300 MG tablet Take 1 tablet (300 mg total) by mouth once daily.    ALPRAZolam (XANAX) 0.25 MG tablet TAKE ONE TABLET BY MOUTH TWICE DAILY AS NEEDED FOR ANXIETY    ascorbic acid, vitamin C, (VITAMIN C) 100 MG tablet Take by mouth once daily.    aspirin (ECOTRIN) 81 MG EC tablet Take 81 mg by mouth nightly.     calcitRIOL (ROCALTROL) 0.25 MCG Cap Take 1 capsule (0.25 mcg total) by mouth every Mon, Wed, Fri.    clopidogrel (PLAVIX) 75 mg tablet TAKE ONE TABLET BY MOUTH EVERY DAY    COLCRYS 0.6 mg tablet Take 1 tablet (0.6 mg total) by mouth once daily.    diclofenac sodium 1 % Gel Apply 2 g topically once daily. Apply 2 g over painful joints once or twice a day.    ergocalciferol (ERGOCALCIFEROL) 50,000 unit Cap Take 1 capsule (50,000 Units total) by mouth every 7 days.    fluticasone (FLONASE) 50 mcg/actuation nasal spray 2 sprays by Each Nare route once daily. (Patient taking  differently: 2 sprays by Each Nare route daily as needed. )    furosemide (LASIX) 20 MG tablet TAKE ONE TABLET BY MOUTH ONCE DAILY    gabapentin (NEURONTIN) 300 MG capsule Take 1 capsule (300 mg total) by mouth 3 (three) times daily.    iron-vitamin C 100-250 mg, ICAR-C, (ICAR-C) 100-250 mg Tab Take 1 tablet by mouth once daily.    isosorbide mononitrate (IMDUR) 30 MG 24 hr tablet TAKE ONE TABLET BY MOUTH AT BEDTIME    levothyroxine (SYNTHROID) 75 MCG tablet TAKE 1 TABLET(75 MCG) BY MOUTH BEFORE BREAKFAST    loratadine (CLARITIN) 10 mg tablet Take 1 tablet (10 mg total) by mouth once daily.    losartan (COZAAR) 25 MG tablet Take 1 tablet (25 mg total) by mouth once daily.    meloxicam (MOBIC) 7.5 MG tablet TAKE ONE TABLET BY MOUTH EVERY DAY AS NEEDED FOR PAIN    metoprolol tartrate (LOPRESSOR) 25 MG tablet TAKE ONE TABLET BY MOUTH TWICE DAILY    mirtazapine (REMERON SOL-TAB) 15 MG disintegrating tablet DISSOLVE ONE TABLET BY MOUTH NIGHTLY    multivitamin (ONE DAILY MULTIVITAMIN) per tablet Take 1 tablet by mouth once daily.    ranitidine (ZANTAC) 150 MG capsule Take 150 mg by mouth nightly.     rosuvastatin (CRESTOR) 10 MG tablet TAKE ONE TABLET BY MOUTH EVERY EVENING    sertraline (ZOLOFT) 100 MG tablet TAKE 1.5 TABLETS BY MOUTH ONCE DAILY    sodium bicarbonate 650 MG tablet Take 1 tablet (650 mg total) by mouth 2 (two) times daily.    ZINC ACETATE ORAL Take 250 mg by mouth once daily.     sodium,potassium,mag sulfates (SUPREP BOWEL PREP KIT) 17.5-3.13-1.6 gram SolR As directed for colonoscopy     Current Facility-Administered Medications on File Prior to Visit   Medication    denosumab (PROLIA) injection 60 mg       Review of Systems   Constitution: Negative for diaphoresis, weakness, malaise/fatigue, weight gain and weight loss.   HENT: Negative for congestion and nosebleeds.    Cardiovascular: Negative for chest pain, claudication, cyanosis, dyspnea on exertion, irregular heartbeat, leg  "swelling, near-syncope, orthopnea, palpitations, paroxysmal nocturnal dyspnea and syncope.   Respiratory: Negative for cough, hemoptysis, shortness of breath, sleep disturbances due to breathing, snoring, sputum production and wheezing.    Hematologic/Lymphatic: Negative for bleeding problem. Does not bruise/bleed easily.   Skin: Negative for rash.   Musculoskeletal: Negative for arthritis, back pain, falls, joint pain, muscle cramps and muscle weakness.   Gastrointestinal: Negative for abdominal pain, constipation, diarrhea, heartburn, hematemesis, hematochezia, melena and nausea.   Genitourinary: Negative for dysuria, hematuria and nocturia.   Neurological: Negative for excessive daytime sleepiness, dizziness, headaches, light-headedness, loss of balance, numbness and vertigo.       Objective:   Physical Exam   Constitutional: She is oriented to person, place, and time. She appears well-developed and well-nourished.   Neck: Neck supple. No JVD present.   Cardiovascular: Normal rate, regular rhythm, normal heart sounds and normal pulses. Exam reveals no friction rub.   No murmur heard.  Pulmonary/Chest: Effort normal and breath sounds normal. No respiratory distress. She has no wheezes. She has no rales.   Left PPM pocket WNL    Abdominal: Soft. Bowel sounds are normal. She exhibits no distension.   Musculoskeletal: She exhibits edema (trace left edema). She exhibits no tenderness.   Neurological: She is alert and oriented to person, place, and time.   Skin: Skin is warm and dry. No rash noted.   Psychiatric: She has a normal mood and affect. Her behavior is normal.   Nursing note and vitals reviewed.    Vitals:    11/02/18 0834   BP: (!) 140/80   BP Location: Right arm   Patient Position: Sitting   BP Method: Medium (Manual)   Pulse: 74   Weight: 72.5 kg (159 lb 13.3 oz)   Height: 5' 4" (1.626 m)     Lab Results   Component Value Date    CHOL 92 (L) 07/19/2018    CHOL 138 04/05/2017    CHOL 114 (L) 11/07/2016 "     Lab Results   Component Value Date    HDL 55 07/19/2018    HDL 58 04/05/2017    HDL 51 11/07/2016     Lab Results   Component Value Date    LDLCALC 26.4 (L) 07/19/2018    LDLCALC 62.8 (L) 04/05/2017    LDLCALC 41.8 (L) 11/07/2016     Lab Results   Component Value Date    TRIG 53 07/19/2018    TRIG 86 04/05/2017    TRIG 106 11/07/2016     Lab Results   Component Value Date    CHOLHDL 59.8 (H) 07/19/2018    CHOLHDL 42.0 04/05/2017    CHOLHDL 44.7 11/07/2016       Chemistry        Component Value Date/Time     08/20/2018 1116    K 4.8 08/20/2018 1116     (H) 08/20/2018 1116    CO2 22 (L) 08/20/2018 1116    BUN 18 08/20/2018 1116    CREATININE 1.1 08/20/2018 1116    GLU 87 08/20/2018 1116        Component Value Date/Time    CALCIUM 9.0 08/20/2018 1116    ALKPHOS 92 08/20/2018 1116    AST 66 (H) 08/20/2018 1116    ALT 71 (H) 08/20/2018 1116    BILITOT 0.4 08/20/2018 1116    ESTGFRAFRICA 56 (A) 08/20/2018 1116    EGFRNONAA 49 (A) 08/20/2018 1116          Lab Results   Component Value Date    TSH 3.227 06/18/2018     Lab Results   Component Value Date    INR 1.0 09/01/2017    INR 1.0 04/05/2017     Lab Results   Component Value Date    WBC 7.09 08/06/2018    HGB 10.4 (L) 08/06/2018    HCT 35.0 (L) 08/06/2018     (H) 08/06/2018     08/06/2018     BMP  Sodium   Date Value Ref Range Status   08/20/2018 142 136 - 145 mmol/L Final     Potassium   Date Value Ref Range Status   08/20/2018 4.8 3.5 - 5.1 mmol/L Final     Chloride   Date Value Ref Range Status   08/20/2018 114 (H) 95 - 110 mmol/L Final     CO2   Date Value Ref Range Status   08/20/2018 22 (L) 23 - 29 mmol/L Final     BUN, Bld   Date Value Ref Range Status   08/20/2018 18 8 - 23 mg/dL Final     Creatinine   Date Value Ref Range Status   08/20/2018 1.1 0.5 - 1.4 mg/dL Final     Calcium   Date Value Ref Range Status   08/20/2018 9.0 8.7 - 10.5 mg/dL Final     Anion Gap   Date Value Ref Range Status   08/20/2018 6 (L) 8 - 16 mmol/L Final      eGFR if    Date Value Ref Range Status   08/20/2018 56 (A) >60 mL/min/1.73 m^2 Final     eGFR if non    Date Value Ref Range Status   08/20/2018 49 (A) >60 mL/min/1.73 m^2 Final     Comment:     Calculation used to obtain the estimated glomerular filtration  rate (eGFR) is the CKD-EPI equation.        CrCl cannot be calculated (Patient's most recent lab result is older than the maximum 7 days allowed.).      CONCLUSIONS     1 - Low normal to mildly depressed left ventricular systolic function (EF 50-55%).     2 - Normal left ventricular diastolic function.     3 - Upper limit of normal ascending aorta.     4 - No wall motion abnormalities.     5 - Normal right ventricular systolic function .     6 - Trivial mitral regurgitation.             This document has been electronically    SIGNED BY: Montez Oviedo MD On: 10/23/2018 12:33      Assessment:     1. Ischemic cardiomyopathy    2. Essential hypertension    3. Mixed hyperlipidemia    4. Coronary artery disease : multiple vessels, s/p PTCA    5. Calcification of aorta    6. Paroxysmal atrial fibrillation    7. Stage 3 chronic kidney disease    8. Chronic anemia    9. Pacemaker      Has responded well to Lasix 40mg x2 days. But edema returned  No evidence of volume overload on exam  BP stable  No angina or equivalent  Normal LVF on echo   Plan:     Continue Lasix 40mg daily   KCL 20meq daily   Heart healthy diet  Maintain active lifestyle  RTC in 3 months with BMP before visit

## 2018-11-05 ENCOUNTER — CLINICAL SUPPORT (OUTPATIENT)
Dept: REHABILITATION | Facility: HOSPITAL | Age: 78
End: 2018-11-05
Attending: PHYSICAL MEDICINE & REHABILITATION
Payer: MEDICARE

## 2018-11-05 DIAGNOSIS — M46.1 SACROILIITIS: ICD-10-CM

## 2018-11-05 DIAGNOSIS — M54.41 CHRONIC RIGHT-SIDED LOW BACK PAIN WITH RIGHT-SIDED SCIATICA: Primary | ICD-10-CM

## 2018-11-05 DIAGNOSIS — G89.29 CHRONIC RIGHT-SIDED LOW BACK PAIN WITH RIGHT-SIDED SCIATICA: Primary | ICD-10-CM

## 2018-11-05 PROCEDURE — 97110 THERAPEUTIC EXERCISES: CPT | Mod: HCNC

## 2018-11-05 PROCEDURE — G8979 MOBILITY GOAL STATUS: HCPCS | Mod: CJ,HCNC

## 2018-11-05 PROCEDURE — G8978 MOBILITY CURRENT STATUS: HCPCS | Mod: CK,HCNC

## 2018-11-05 NOTE — PROGRESS NOTES
"PHYSICAL THERAPY INITIAL OUTPATIENT EVALUATION    Referring Provider:  Dr. Lorraine Rizo    Diagnosis:       ICD-10-CM ICD-9-CM    1. 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 Sacroiliitis M46.1 720.2        Orders:  Evaluate and Treat   Date : 11/05/2018    Visit # 10    SUBJECTIVE   Pt reports she has been feeling better over past week. Has been doing HEP regularly .     HISTORY:  78 y.o.  female being seen in clinic today for increased pain in her right low back pain with sharp, stabbing pain and pain into her leg.  States she bent over to  string on floor and when came up she felt a catch in lower back. Concentrated in lower right LSp. Her symptoms have persisted over the past 2 months. In the morning, she is stiff.  Moving and changing position provides some relief. Once bends is hard to straighten.  Feels more uncomfortable in static postures and has to frequently adjust posture .   No numbness / tingling . Pain will extends down to knee at times , is constant in lower back.  Comfortable lying on left side with right hip flexed " up and over ".   Meloxicam and gabapentin - with some relief . Seems to help neuropathy.      Onset: 2 months  Constant/ intermittent:  Constant - varies in intensity    Pain at worst: 8/10  Pain at best: 1-2 /10  Pain currently:  3/10    Patient goal for PT: Pain relief     Occupation / daily activities: retired     Function: Patient reports 56% disability based on score of the Oswestry Low back Pain questionnaire Scale.  ( 66% at eval)     The following scores are patient-reported and range from 0-5, with 0 being least impaired and 5 being most impaired.    Section 1- Pain intensity    Score 1/5   Section 2- Person care  Score 1/5   Section 3 Lifting- Optional  Score 4/5     Section 4  Walking  Score 3/5  Section 5 Sitting   Score 4/5   Section 6 Standing  Score 4/5  Section 7 Sleeping  Score 2/5   Section 8 " Social Life   Score 3/5  Section 9 Traveling  Score 4/5   Section 10 Employ/home  Score 2/5    OBJECTIVE :    Observation / Posture: Increased TSp kyphosis with runded shoulder and forward head posture , level iliac crest , level PSIS    Gait: ambulates with RW , does have cane as well. Balance issues - will drift laterally . At home usually no AD - will manage with hand held assist on wall , furniture as needed.     Lumbar AROM:   % Pain Present (Y/N)   FB 75 no   BB 75  no   RSB 75 no   LSB 75 no   RR 75  no   LR 75 no     Other AROM/PROM: decreased right hip IR to < 20 deg     Lower Extremity Strength:   WFL, No Myotomal weakness and weakness noted    Other strength: poor activation of trans abd / trunk extensors - weakness in both noted with lower limb elevation     Neuro/Sensation:     Reflexes :  Patellar- 2+     Achilles- 2+     Special Test:   SLR:   Negative  Slump: Negative   SI forward bend: Negative   Sacral / Pelvic positioning: level and symmetrical   Shear Testing:  negative    Joint Mobility:  Hypomobility / guarding mildly     Palpation:  Tender to palpation at right L5-S1 level into paraspinals and less so into glut / piriformis     TREATMENT PROVIDED:  -Manual Therapy: ------------------------------------  -Therapeutic Exercise: nu step x 6 mins neural gliding , piriformis stretch 4 x 30 sec , PPT / APT x 30 , DKTC with ball x 3 mins, bilateral hip ER to green band x 30 , hip flex to band x 30 , iso TA press downs with ball  , ball rollouts forward and to right / left x 30 , seated rows 20# 3 x 10 , seated palloff pres 20# 3 x 10  ( 40 mins )   -Modalities: MH to LSp x 10 mins   -Education on proper posture, body mechanics and condition    ASSESSMENT:  The patient is a 78 y.o. year old female who presents to physical therapy with complaints of 2 months of right lower back pain with pain extending to right leg.   Symptoms have a directional preference for flexion and are stenotic and facet in  nature. Tolerating flexion based exercises well.  She has made some significant progress however still has notable limitations and pain and will benefit from further treatment.     Functional Limitations and Goal:   This patient's primary Physical Therapy goal is to improve his current level of function(Walking and moving around ) with minimal limitations. The patient's current level of impairment is 40-60% Impaired(CK) based on their score of 56% impaired on the Ferrer balance. Has made some progress ( 10% from eval) .  The Patient is expected to achieve a score of 20-39%(CJ) within 10 further sessions of treatment    Co-morbidities which may impact the plan of care and potentially impede the patient's progress in therapy include:   Diagnosis    Age-related osteoporosis without current pathological fracture    Anemia    Anxiety    Arthritis    Cancer    Chronic midline low back pain with right-sided sciatica    Coronary artery disease    Depression    Disorder of kidney and ureter    Encounter for blood transfusion    GERD (gastroesophageal reflux disease)    Gout, arthritis    Heart failure    Hx of psychiatric care    Hyperlipidemia    Hypertension    Hypothyroidism    Immune deficiency disorder    Lung nodule    Obesity    Pacemaker    Paroxysmal atrial fibrillation    Pneumonia    Polyneuropathy    Psychiatric problem    Tobacco dependence       Trouble in sleeping     Patients CLINICAL PRESENTATION is STABLE .    Short Term Goals:    1. Pt to reports a subjective decrease in pain   MET   2. Pt to be instructed in a home exercise program / self care plan   MET      Long Term Goals:  1 .Pt to score  < 30%  on Oswestry Low back pain disability questionnaire  2. Pt to report minimal to no LBP with ADLS- dressing , grooming   3. Pt to have LSp ROM pain kareem and > 75% all directions  MET   4. Pt to have negative neural mobility testing  MET     PLAN:  Patient will benefit from physical  therapy (2) x/week for (6) weeks including manual therapy, therapeutic exercise, functional activities, modalities, and patient education.    Thank you for this referral.    Nash Jones, PT, OCS

## 2018-11-07 ENCOUNTER — CLINICAL SUPPORT (OUTPATIENT)
Dept: REHABILITATION | Facility: HOSPITAL | Age: 78
End: 2018-11-07
Attending: PHYSICAL MEDICINE & REHABILITATION
Payer: MEDICARE

## 2018-11-07 DIAGNOSIS — G89.29 CHRONIC RIGHT-SIDED LOW BACK PAIN WITH RIGHT-SIDED SCIATICA: Primary | ICD-10-CM

## 2018-11-07 DIAGNOSIS — M46.1 SACROILIITIS: ICD-10-CM

## 2018-11-07 DIAGNOSIS — M54.41 CHRONIC RIGHT-SIDED LOW BACK PAIN WITH RIGHT-SIDED SCIATICA: Primary | ICD-10-CM

## 2018-11-07 PROCEDURE — 97014 ELECTRIC STIMULATION THERAPY: CPT | Mod: HCNC

## 2018-11-07 PROCEDURE — 97110 THERAPEUTIC EXERCISES: CPT | Mod: HCNC

## 2018-11-07 NOTE — PROGRESS NOTES
"PHYSICAL THERAPY INITIAL OUTPATIENT EVALUATION    Referring Provider:  Dr. Lorraine Rizo    Diagnosis:       ICD-10-CM ICD-9-CM    1. 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 Sacroiliitis M46.1 720.2        Orders:  Evaluate and Treat   Date : 11/07/2018    Visit # 14    SUBJECTIVE   Pt reports she has continued to feel better over past few sessions. Has been doing HEP regularly .     HISTORY:  78 y.o.  female being seen in clinic today for increased pain in her right low back pain with sharp, stabbing pain and pain into her leg.  States she bent over to  string on floor and when came up she felt a catch in lower back. Concentrated in lower right LSp. Her symptoms have persisted over the past 2 months. In the morning, she is stiff.  Moving and changing position provides some relief. Once bends is hard to straighten.  Feels more uncomfortable in static postures and has to frequently adjust posture .   No numbness / tingling . Pain will extends down to knee at times , is constant in lower back.  Comfortable lying on left side with right hip flexed " up and over ".   Meloxicam and gabapentin - with some relief . Seems to help neuropathy.      Onset: 2 months  Constant/ intermittent:  Constant - varies in intensity    Pain at worst: 8/10  Pain at best: 1-2 /10  Pain currently:  3/10    Patient goal for PT: Pain relief     Occupation / daily activities: retired     Function: Patient reports 56% disability based on score of the Oswestry Low back Pain questionnaire Scale.  ( 66% at eval)     The following scores are patient-reported and range from 0-5, with 0 being least impaired and 5 being most impaired.    Section 1- Pain intensity    Score 1/5   Section 2- Person care  Score 1/5   Section 3 Lifting- Optional  Score 4/5     Section 4  Walking  Score 3/5  Section 5 Sitting   Score 4/5   Section 6 Standing  Score 4/5  Section 7 Sleeping  Score " 2/5   Section 8 Social Life   Score 3/5  Section 9 Traveling  Score 4/5   Section 10 Employ/home  Score 2/5    OBJECTIVE :    Observation / Posture: Increased TSp kyphosis with runded shoulder and forward head posture , level iliac crest , level PSIS    Gait: ambulates with RW , does have cane as well. Balance issues - will drift laterally . At home usually no AD - will manage with hand held assist on wall , furniture as needed.     Lumbar AROM:   % Pain Present (Y/N)   FB 75 no   BB 75  no   RSB 75 no   LSB 75 no   RR 75  no   LR 75 no     Other AROM/PROM: decreased right hip IR to < 20 deg     Lower Extremity Strength:   WFL, No Myotomal weakness and weakness noted    Other strength: poor activation of trans abd / trunk extensors - weakness in both noted with lower limb elevation     Neuro/Sensation:     Reflexes :  Patellar- 2+     Achilles- 2+     Special Test:   SLR:   Negative  Slump: Negative   SI forward bend: Negative   Sacral / Pelvic positioning: level and symmetrical   Shear Testing:  negative    Joint Mobility:  Hypomobility / guarding mildly     Palpation:  Tender to palpation at right L5-S1 level into paraspinals and less so into glut / piriformis     TREATMENT PROVIDED:  -Manual Therapy: ------------------------------------  -Therapeutic Exercise: nu step x 6 mins neural gliding , piriformis stretch 4 x 30 sec , PPT / APT x 30 , DKTC with ball x 3 mins, bilateral hip ER to green band x 30 , hip flex to band x 30 , iso TA press downs with ball  , ball rollouts forward and to right / left x 30 , seated rows 20# 3 x 10 , seated palloff pres 20# 3 x 10  ( 40 mins )   -Modalities: MH to LSp x 10 mins   -Education on proper posture, body mechanics and condition    ASSESSMENT:  The patient is a 78 y.o. year old female who presents to physical therapy with complaints of 2 months of right lower back pain with pain extending to right leg.   Has been doing better over recent sessions with a focus on flexion  based exercises.  Performs all well with some cues needed.     Functional Limitations and Goal:   This patient's primary Physical Therapy goal is to improve his current level of function(Walking and moving around ) with minimal limitations. The patient's current level of impairment is 40-60% Impaired(CK) based on their score of 56% impaired on the Ferrer balance. Has made some progress ( 10% from eval) .  The Patient is expected to achieve a score of 20-39%(CJ) within 10 further sessions of treatment    Co-morbidities which may impact the plan of care and potentially impede the patient's progress in therapy include:   Diagnosis    Age-related osteoporosis without current pathological fracture    Anemia    Anxiety    Arthritis    Cancer    Chronic midline low back pain with right-sided sciatica    Coronary artery disease    Depression    Disorder of kidney and ureter    Encounter for blood transfusion    GERD (gastroesophageal reflux disease)    Gout, arthritis    Heart failure    Hx of psychiatric care    Hyperlipidemia    Hypertension    Hypothyroidism    Immune deficiency disorder    Lung nodule    Obesity    Pacemaker    Paroxysmal atrial fibrillation    Pneumonia    Polyneuropathy    Psychiatric problem    Tobacco dependence       Trouble in sleeping     Patients CLINICAL PRESENTATION is STABLE .    Short Term Goals:    1. Pt to reports a subjective decrease in pain   MET   2. Pt to be instructed in a home exercise program / self care plan   MET      Long Term Goals:  1 .Pt to score  < 30%  on Oswestry Low back pain disability questionnaire  2. Pt to report minimal to no LBP with ADLS- dressing , grooming   3. Pt to have LSp ROM pain kareem and > 75% all directions  MET   4. Pt to have negative neural mobility testing  MET     PLAN:  Patient will benefit from physical therapy (2) x/week for (6) weeks including manual therapy, therapeutic exercise, functional activities, modalities,  and patient education.    Thank you for this referral.    Nash Jones, PT, OCS

## 2018-11-12 ENCOUNTER — CLINICAL SUPPORT (OUTPATIENT)
Dept: REHABILITATION | Facility: HOSPITAL | Age: 78
End: 2018-11-12
Attending: PHYSICAL MEDICINE & REHABILITATION
Payer: MEDICARE

## 2018-11-12 DIAGNOSIS — M54.41 CHRONIC RIGHT-SIDED LOW BACK PAIN WITH RIGHT-SIDED SCIATICA: Primary | ICD-10-CM

## 2018-11-12 DIAGNOSIS — G89.29 CHRONIC RIGHT-SIDED LOW BACK PAIN WITH RIGHT-SIDED SCIATICA: Primary | ICD-10-CM

## 2018-11-12 DIAGNOSIS — M46.1 SACROILIITIS: ICD-10-CM

## 2018-11-12 PROCEDURE — 97140 MANUAL THERAPY 1/> REGIONS: CPT | Mod: HCNC

## 2018-11-12 PROCEDURE — 97110 THERAPEUTIC EXERCISES: CPT | Mod: HCNC

## 2018-11-12 NOTE — PROGRESS NOTES
"PHYSICAL THERAPY INITIAL OUTPATIENT EVALUATION    Referring Provider:  Dr. Lorraine Rizo    Diagnosis:       ICD-10-CM ICD-9-CM    1. 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 Sacroiliitis M46.1 720.2        Orders:  Evaluate and Treat   Date : 11/12/2018    Visit # 15    SUBJECTIVE   Pt reports she is notably better than prior but still has pain in right sided lower back and SI / buttock region. Feels that she is walking straighter .      HISTORY:  78 y.o.  female being seen in clinic today for increased pain in her right low back pain with sharp, stabbing pain and pain into her leg.  States she bent over to  string on floor and when came up she felt a catch in lower back. Concentrated in lower right LSp. Her symptoms have persisted over the past 2 months. In the morning, she is stiff.  Moving and changing position provides some relief. Once bends is hard to straighten.  Feels more uncomfortable in static postures and has to frequently adjust posture .   No numbness / tingling . Pain will extends down to knee at times , is constant in lower back.  Comfortable lying on left side with right hip flexed " up and over ".   Meloxicam and gabapentin - with some relief . Seems to help neuropathy.      Onset: 2 months  Constant/ intermittent:  Constant - varies in intensity    Pain at worst: 8/10  Pain at best: 1-2 /10  Pain currently:  3/10    Patient goal for PT: Pain relief     Occupation / daily activities: retired     Function: Patient reports 56% disability based on score of the Oswestry Low back Pain questionnaire Scale.  ( 66% at eval)     The following scores are patient-reported and range from 0-5, with 0 being least impaired and 5 being most impaired.    Section 1- Pain intensity    Score 1/5   Section 2- Person care  Score 1/5   Section 3 Lifting- Optional  Score 4/5     Section 4  Walking  Score 3/5  Section 5 Sitting   Score " 4/5   Section 6 Standing  Score 4/5  Section 7 Sleeping  Score 2/5   Section 8 Social Life   Score 3/5  Section 9 Traveling  Score 4/5   Section 10 Employ/home  Score 2/5    OBJECTIVE :    Observation / Posture: Increased TSp kyphosis with runded shoulder and forward head posture , level iliac crest , level PSIS    Gait: ambulates with RW , does have cane as well. Balance issues - will drift laterally . At home usually no AD - will manage with hand held assist on wall , furniture as needed.     Lumbar AROM:   % Pain Present (Y/N)   FB 75 no   BB 75  no   RSB 75 no   LSB 75 no   RR 75  no   LR 75 no     Other AROM/PROM: decreased right hip IR to < 20 deg     Lower Extremity Strength:   WFL, No Myotomal weakness and weakness noted    Other strength: poor activation of trans abd / trunk extensors - weakness in both noted with lower limb elevation     Neuro/Sensation:     Reflexes :  Patellar- 2+     Achilles- 2+     Special Test:   SLR:   Negative  Slump: Negative   SI forward bend: Negative   Sacral / Pelvic positioning: level and symmetrical   Shear Testing:  negative    Joint Mobility:  Hypomobility / guarding mildly     Palpation:  Tender to palpation at right L5-S1 level into paraspinals and less so into glut / piriformis     TREATMENT PROVIDED:  -Manual Therapy: Hip long axis distratction , PA mobs unilateral for righ topening , STM to LSp paraspinals R>L ( 10 mins)    Therapeutic Exercise: nu step x 6 mins neural gliding , piriformis stretch 4 x 30 sec , PPT / APT x 30 , DKTC with ball x 3 mins, bilateral hip ER to green band x 30 , hip flex to band x 30 , iso TA press downs with ball  , ball rollouts forward and to right / left x 30  ( 35 mins )   -Modalities: MH to LSp x 10 mins   -Education on proper posture, body mechanics and condition    ASSESSMENT:  The patient is a 78 y.o. year old female who presents to physical therapy with complaints of 2 months of right lower back pain with pain extending to right  leg.   Has been doing better of late. Tolerated hip mobs . Pain with FABERS and tigtness of right hip. Good response to opening mobs to right lumbosacral junction and lower LSp facets . May benefit from continued treatment for hip.       Functional Limitations and Goal:   This patient's primary Physical Therapy goal is to improve his current level of function(Walking and moving around ) with minimal limitations. The patient's current level of impairment is 40-60% Impaired(CK) based on their score of 56% impaired on the Ferrer balance. Has made some progress ( 10% from eval) .  The Patient is expected to achieve a score of 20-39%(CJ) within 10 further sessions of treatment    Co-morbidities which may impact the plan of care and potentially impede the patient's progress in therapy include:   Diagnosis    Age-related osteoporosis without current pathological fracture    Anemia    Anxiety    Arthritis    Cancer    Chronic midline low back pain with right-sided sciatica    Coronary artery disease    Depression    Disorder of kidney and ureter    Encounter for blood transfusion    GERD (gastroesophageal reflux disease)    Gout, arthritis    Heart failure    Hx of psychiatric care    Hyperlipidemia    Hypertension    Hypothyroidism    Immune deficiency disorder    Lung nodule    Obesity    Pacemaker    Paroxysmal atrial fibrillation    Pneumonia    Polyneuropathy    Psychiatric problem    Tobacco dependence       Trouble in sleeping     Patients CLINICAL PRESENTATION is STABLE .    Short Term Goals:    1. Pt to reports a subjective decrease in pain   MET   2. Pt to be instructed in a home exercise program / self care plan   MET      Long Term Goals:  1 .Pt to score  < 30%  on Oswestry Low back pain disability questionnaire  2. Pt to report minimal to no LBP with ADLS- dressing , grooming   3. Pt to have LSp ROM pain kareem and > 75% all directions  MET   4. Pt to have negative neural mobility  testing  MET     PLAN:  Patient will benefit from physical therapy (2) x/week for (6) weeks including manual therapy, therapeutic exercise, functional activities, modalities, and patient education.    Thank you for this referral.    Nash Jones, PT, OCS

## 2018-11-14 ENCOUNTER — OFFICE VISIT (OUTPATIENT)
Dept: GASTROENTEROLOGY | Facility: CLINIC | Age: 78
End: 2018-11-14
Payer: MEDICARE

## 2018-11-14 ENCOUNTER — OFFICE VISIT (OUTPATIENT)
Dept: HEMATOLOGY/ONCOLOGY | Facility: CLINIC | Age: 78
End: 2018-11-14
Payer: MEDICARE

## 2018-11-14 ENCOUNTER — LAB VISIT (OUTPATIENT)
Dept: LAB | Facility: HOSPITAL | Age: 78
End: 2018-11-14
Attending: INTERNAL MEDICINE
Payer: MEDICARE

## 2018-11-14 VITALS
TEMPERATURE: 91 F | DIASTOLIC BLOOD PRESSURE: 88 MMHG | WEIGHT: 153.44 LBS | HEIGHT: 64 IN | BODY MASS INDEX: 26.2 KG/M2 | RESPIRATION RATE: 18 BRPM | OXYGEN SATURATION: 91 % | SYSTOLIC BLOOD PRESSURE: 140 MMHG | HEART RATE: 89 BPM

## 2018-11-14 VITALS
DIASTOLIC BLOOD PRESSURE: 68 MMHG | HEART RATE: 72 BPM | BODY MASS INDEX: 26.34 KG/M2 | HEIGHT: 64 IN | WEIGHT: 154.31 LBS | SYSTOLIC BLOOD PRESSURE: 112 MMHG

## 2018-11-14 DIAGNOSIS — K73.9 CHRONIC HEPATITIS: ICD-10-CM

## 2018-11-14 DIAGNOSIS — C83.32 DIFFUSE LARGE B-CELL LYMPHOMA OF INTRATHORACIC LYMPH NODES: ICD-10-CM

## 2018-11-14 DIAGNOSIS — G62.0 CHEMOTHERAPY-INDUCED NEUROPATHY: Chronic | ICD-10-CM

## 2018-11-14 DIAGNOSIS — R79.89 ABNORMAL LFTS: Primary | ICD-10-CM

## 2018-11-14 DIAGNOSIS — R79.89 ABNORMAL LFTS: ICD-10-CM

## 2018-11-14 DIAGNOSIS — T45.1X5A CHEMOTHERAPY-INDUCED NEUROPATHY: Chronic | ICD-10-CM

## 2018-11-14 DIAGNOSIS — C85.83 LARGE CELL LYMPHOMA OF INTRA-ABDOMINAL LYMPH NODES: Primary | Chronic | ICD-10-CM

## 2018-11-14 DIAGNOSIS — D50.8 OTHER IRON DEFICIENCY ANEMIA: ICD-10-CM

## 2018-11-14 DIAGNOSIS — D64.9 CHRONIC ANEMIA: Chronic | ICD-10-CM

## 2018-11-14 DIAGNOSIS — E87.5 HYPERKALEMIA: ICD-10-CM

## 2018-11-14 DIAGNOSIS — Z98.84 S/P GASTRIC BYPASS: Chronic | ICD-10-CM

## 2018-11-14 LAB
ALBUMIN SERPL BCP-MCNC: 3.3 G/DL
ALBUMIN SERPL BCP-MCNC: 3.3 G/DL
ALP SERPL-CCNC: 88 U/L
ALP SERPL-CCNC: 88 U/L
ALT SERPL W/O P-5'-P-CCNC: 79 U/L
ALT SERPL W/O P-5'-P-CCNC: 79 U/L
ANION GAP SERPL CALC-SCNC: 4 MMOL/L
ANION GAP SERPL CALC-SCNC: 4 MMOL/L
AST SERPL-CCNC: 102 U/L
AST SERPL-CCNC: 102 U/L
BASOPHILS # BLD AUTO: 0.01 K/UL
BASOPHILS NFR BLD: 0.2 %
BILIRUB SERPL-MCNC: 0.3 MG/DL
BILIRUB SERPL-MCNC: 0.3 MG/DL
BUN SERPL-MCNC: 22 MG/DL
BUN SERPL-MCNC: 22 MG/DL
CALCIUM SERPL-MCNC: 9.4 MG/DL
CALCIUM SERPL-MCNC: 9.4 MG/DL
CHLORIDE SERPL-SCNC: 113 MMOL/L
CHLORIDE SERPL-SCNC: 113 MMOL/L
CO2 SERPL-SCNC: 21 MMOL/L
CO2 SERPL-SCNC: 21 MMOL/L
CREAT SERPL-MCNC: 1.1 MG/DL
CREAT SERPL-MCNC: 1.1 MG/DL
DIFFERENTIAL METHOD: ABNORMAL
EOSINOPHIL # BLD AUTO: 0.1 K/UL
EOSINOPHIL NFR BLD: 1.7 %
ERYTHROCYTE [DISTWIDTH] IN BLOOD BY AUTOMATED COUNT: 17.9 %
EST. GFR  (AFRICAN AMERICAN): 56 ML/MIN/1.73 M^2
EST. GFR  (AFRICAN AMERICAN): 56 ML/MIN/1.73 M^2
EST. GFR  (NON AFRICAN AMERICAN): 49 ML/MIN/1.73 M^2
EST. GFR  (NON AFRICAN AMERICAN): 49 ML/MIN/1.73 M^2
FERRITIN SERPL-MCNC: 105 NG/ML
GLUCOSE SERPL-MCNC: 86 MG/DL
GLUCOSE SERPL-MCNC: 86 MG/DL
HCT VFR BLD AUTO: 34.4 %
HGB BLD-MCNC: 10.6 G/DL
IRON SERPL-MCNC: 67 UG/DL
LYMPHOCYTES # BLD AUTO: 3.8 K/UL
LYMPHOCYTES NFR BLD: 59.2 %
MCH RBC QN AUTO: 31.9 PG
MCHC RBC AUTO-ENTMCNC: 30.8 G/DL
MCV RBC AUTO: 104 FL
MONOCYTES # BLD AUTO: 0.5 K/UL
MONOCYTES NFR BLD: 7.5 %
NEUTROPHILS # BLD AUTO: 2 K/UL
NEUTROPHILS NFR BLD: 31.4 %
PLATELET # BLD AUTO: 173 K/UL
PMV BLD AUTO: 10.5 FL
POTASSIUM SERPL-SCNC: 5.8 MMOL/L
POTASSIUM SERPL-SCNC: 5.8 MMOL/L
PROT SERPL-MCNC: 6.5 G/DL
PROT SERPL-MCNC: 6.5 G/DL
RBC # BLD AUTO: 3.32 M/UL
SATURATED IRON: 18 %
SODIUM SERPL-SCNC: 138 MMOL/L
SODIUM SERPL-SCNC: 138 MMOL/L
TOTAL IRON BINDING CAPACITY: 369 UG/DL
TRANSFERRIN SERPL-MCNC: 249 MG/DL
WBC # BLD AUTO: 6.44 K/UL

## 2018-11-14 PROCEDURE — 99214 OFFICE O/P EST MOD 30 MIN: CPT | Mod: 25,HCNC,S$GLB, | Performed by: INTERNAL MEDICINE

## 2018-11-14 PROCEDURE — 85025 COMPLETE CBC W/AUTO DIFF WBC: CPT | Mod: HCNC,PO

## 2018-11-14 PROCEDURE — 1101F PT FALLS ASSESS-DOCD LE1/YR: CPT | Mod: CPTII,HCNC,S$GLB, | Performed by: INTERNAL MEDICINE

## 2018-11-14 PROCEDURE — 82728 ASSAY OF FERRITIN: CPT | Mod: HCNC

## 2018-11-14 PROCEDURE — 90662 IIV NO PRSV INCREASED AG IM: CPT | Mod: HCNC,S$GLB,, | Performed by: INTERNAL MEDICINE

## 2018-11-14 PROCEDURE — G0008 ADMIN INFLUENZA VIRUS VAC: HCPCS | Mod: HCNC,S$GLB,, | Performed by: INTERNAL MEDICINE

## 2018-11-14 PROCEDURE — 3074F SYST BP LT 130 MM HG: CPT | Mod: CPTII,HCNC,S$GLB, | Performed by: INTERNAL MEDICINE

## 2018-11-14 PROCEDURE — 36415 COLL VENOUS BLD VENIPUNCTURE: CPT | Mod: HCNC,PO

## 2018-11-14 PROCEDURE — 99999 PR PBB SHADOW E&M-EST. PATIENT-LVL III: CPT | Mod: PBBFAC,HCNC,, | Performed by: INTERNAL MEDICINE

## 2018-11-14 PROCEDURE — 99499 UNLISTED E&M SERVICE: CPT | Mod: HCNC,S$GLB,, | Performed by: INTERNAL MEDICINE

## 2018-11-14 PROCEDURE — 99214 OFFICE O/P EST MOD 30 MIN: CPT | Mod: HCNC,S$GLB,, | Performed by: INTERNAL MEDICINE

## 2018-11-14 PROCEDURE — 3078F DIAST BP <80 MM HG: CPT | Mod: CPTII,HCNC,S$GLB, | Performed by: INTERNAL MEDICINE

## 2018-11-14 PROCEDURE — 3079F DIAST BP 80-89 MM HG: CPT | Mod: CPTII,HCNC,S$GLB, | Performed by: INTERNAL MEDICINE

## 2018-11-14 PROCEDURE — 83540 ASSAY OF IRON: CPT | Mod: HCNC

## 2018-11-14 PROCEDURE — 80053 COMPREHEN METABOLIC PANEL: CPT | Mod: HCNC,PO

## 2018-11-14 NOTE — H&P (VIEW-ONLY)
Subjective:     Violet Swenson is here for follow up of Abnormal LFT      HPI  Since Violet Swenson's last visit she denies any acute issues.  She has had a repeat PET scan for lymphoma which is negative.  She is scheduled for an EGD and colonoscopy to evaluate iron deficiency.  She is on aspirin and Plavix.  She will be holding the Plavix in anticipation of the endoscopic procedures.  She is taking Crestor but has been on that chronically.  Denies any other new medications.  Her liver tests have been trending up.    Of note she had labs today showing hyperkalemia but she had been taking potassium supplements.  She was called by her oncologist to stop the potassium supplements.    Review of Systems   Constitutional: Positive for fatigue.   HENT: Negative.    Eyes: Negative.    Respiratory: Negative.    Cardiovascular: Negative.    Gastrointestinal: Positive for abdominal pain. Anal bleeding: lower abdominal cramping.   Genitourinary: Negative.    Musculoskeletal: Negative.    Skin: Negative.    Neurological: Negative.    Psychiatric/Behavioral: Negative.        Objective:     Physical Exam   Constitutional: She is oriented to person, place, and time. She appears well-developed and well-nourished. No distress.   HENT:   Head: Normocephalic and atraumatic.   Mouth/Throat: Oropharynx is clear and moist. No oropharyngeal exudate.   Eyes: Conjunctivae are normal. Pupils are equal, round, and reactive to light. Right eye exhibits no discharge. Left eye exhibits no discharge. No scleral icterus.   Pulmonary/Chest: Effort normal and breath sounds normal. No respiratory distress. She has no wheezes.   Abdominal: Soft. She exhibits no distension. There is no tenderness.   Musculoskeletal: She exhibits no edema.   Neurological: She is alert and oriented to person, place, and time.   Skin: Pallor:    Psychiatric: She has a normal mood and affect. Her behavior is normal.   Vitals reviewed.      Computed  MELD-Na score unavailable. Necessary lab results were not found in the last year.  Computed MELD score unavailable. Necessary lab results were not found in the last year.    WBC   Date Value Ref Range Status   11/14/2018 6.44 3.90 - 12.70 K/uL Final     Hemoglobin   Date Value Ref Range Status   11/14/2018 10.6 (L) 12.0 - 16.0 g/dL Final     POC Hematocrit   Date Value Ref Range Status   04/07/2017 35 (L) 36 - 54 %PCV Final     Hematocrit   Date Value Ref Range Status   11/14/2018 34.4 (L) 37.0 - 48.5 % Final     Platelets   Date Value Ref Range Status   11/14/2018 173 150 - 350 K/uL Final     BUN, Bld   Date Value Ref Range Status   11/14/2018 22 8 - 23 mg/dL Final   11/14/2018 22 8 - 23 mg/dL Final     Creatinine   Date Value Ref Range Status   11/14/2018 1.1 0.5 - 1.4 mg/dL Final   11/14/2018 1.1 0.5 - 1.4 mg/dL Final     Glucose   Date Value Ref Range Status   11/14/2018 86 70 - 110 mg/dL Final   11/14/2018 86 70 - 110 mg/dL Final     Calcium   Date Value Ref Range Status   11/14/2018 9.4 8.7 - 10.5 mg/dL Final   11/14/2018 9.4 8.7 - 10.5 mg/dL Final     Sodium   Date Value Ref Range Status   11/14/2018 138 136 - 145 mmol/L Final   11/14/2018 138 136 - 145 mmol/L Final     Potassium   Date Value Ref Range Status   11/14/2018 5.8 (H) 3.5 - 5.1 mmol/L Final   11/14/2018 5.8 (H) 3.5 - 5.1 mmol/L Final     Chloride   Date Value Ref Range Status   11/14/2018 113 (H) 95 - 110 mmol/L Final   11/14/2018 113 (H) 95 - 110 mmol/L Final     Magnesium   Date Value Ref Range Status   04/09/2017 1.8 1.6 - 2.6 mg/dL Final     AST   Date Value Ref Range Status   11/14/2018 102 (H) 10 - 40 U/L Final   11/14/2018 102 (H) 10 - 40 U/L Final     ALT   Date Value Ref Range Status   11/14/2018 79 (H) 10 - 44 U/L Final   11/14/2018 79 (H) 10 - 44 U/L Final     Alkaline Phosphatase   Date Value Ref Range Status   11/14/2018 88 55 - 135 U/L Final   11/14/2018 88 55 - 135 U/L Final     Total Bilirubin   Date Value Ref Range Status    11/14/2018 0.3 0.1 - 1.0 mg/dL Final     Comment:     For infants and newborns, interpretation of results should be based  on gestational age, weight and in agreement with clinical  observations.  Premature Infant recommended reference ranges:  Up to 24 hours.............<8.0 mg/dL  Up to 48 hours............<12.0 mg/dL  3-5 days..................<15.0 mg/dL  6-29 days.................<15.0 mg/dL     11/14/2018 0.3 0.1 - 1.0 mg/dL Final     Comment:     For infants and newborns, interpretation of results should be based  on gestational age, weight and in agreement with clinical  observations.  Premature Infant recommended reference ranges:  Up to 24 hours.............<8.0 mg/dL  Up to 48 hours............<12.0 mg/dL  3-5 days..................<15.0 mg/dL  6-29 days.................<15.0 mg/dL       Albumin   Date Value Ref Range Status   11/14/2018 3.3 (L) 3.5 - 5.2 g/dL Final   11/14/2018 3.3 (L) 3.5 - 5.2 g/dL Final     INR   Date Value Ref Range Status   09/01/2017 1.0 0.8 - 1.2 Final     Comment:     Coumadin Therapy:  2.0 - 3.0 for INR for all indicators except mechanical heart valves  and antiphospholipid syndromes which should use 2.5 - 3.5.           Assessment/Plan:     1. Abnormal LFTs    2. Hyperkalemia    3. Chronic hepatitis       Violet Swenson is a 78 y.o. female withAbnormal LFT    Abnormal LFTs-etiology unclear.  It seems like before the liver tests were getting better but now they are trending back up.  Lab evaluation has been unremarkable therefore would recommend proceeding with liver biopsy.  -patient would need to be off of both aspirin and Plavix for biopsy.  She has already to be off the Plavix for the upper endoscopy is will ask her cardiologist if it is okay for her to also be off the aspirin.  -     CBC auto differential; Future; Expected date: 11/14/2018  -     Protime-INR; Future; Expected date: 11/14/2018  -     APTT; Future; Expected date: 11/14/2018  -     CT Biopsy Liver  (xpd); Future; Expected date: 11/14/2018    Hyperkalemia-likely related to supplementation  -agree with discontinuing supplementation    Chronic hepatitis   -     APTT; Future; Expected date: 11/14/2018    Return to clinic in 6 months but will be sooner or as needed based on biopsy results.      Dahiana Morales MD

## 2018-11-14 NOTE — H&P (VIEW-ONLY)
Subjective:       Patient ID: Violet Swesnon is a 78 y.o. female.    Chief Complaint: Follow-up    HPI This 78 year old  lady  comes for follow up of her th diffuse large cell lymphoma.   A CT of   the abdomen done on 04/05/2017, done because of abdominal pain ,  was reported as showing a 6.9 x 7.0 x 8.4 cm soft  tissue mass in the right lower quadrant in the terminal ileum abutting the   cecum. There was adjacent conglomerate adenopathy in the right lower quadrant   mesentery.     The patient had a colonoscopy that showed a lesion in the terminal ileum.     She underwent a right hemicolectomy and terminal ileum removal. 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  She had a Ct/PET that showed evidence of surgery and some non specific findings, but no evidence of activer disease elsewhere.  An ECHO showed normal cardiac ejection fraction, as wella s a MUGA.  She was seen cardiology and cleared for ADRIAMYCIN administration.  She was negative for Hep B/C and HIV  Bone marrow was negative.     The patient started  R-CHOP. Ttreatmment  She tolerated the treatment with some minor difficulties. After 3 cycles, she had a repeat CT/PET  There was no activity in the abdomen.  There was development of ground glass opacities/infiltrates in both lungs which were hypermetabolic although the SUV was not reported.  We discussed the imaging studies with Radiology and Pulmonary.  Dr Corral of the Pulmonary Department favored the findings to be due to pulmonary congestion.  Her troponin was  normal, but her BNP was markedly elevated at 1,103.  She was started on Lasix by dr Corral and asked to have a  Ct in few weeks  The patient   had evaluation by Cardiology ( dr Gil).It was felt had developed CHF., with a decrease in her ejection raction to 47%., elevated BNP and imaging studies.  The patient indicated her leg  edema and SOB   improved on lasix. Repeat PET showed  clearance of all the infiltrates  Since, she has had a complete work including cardiac catheterization wirh negative findings.  A repeat CB/PET done  showed   clearance of the previous infiltrates       She comes for follow up. She had a CT/PET in 2018 that shows no evidence of recurrence. Repeat CT/PET 2018 showed also no evidence of recurrence  She has neuropathy of hands, with numbness and some pain.  She is on Neurontin 200 mg tid  During the last visit she was found to have iron defieincy anemia and placed on iron. She is due to have endoscopies later on this month   ALLERGIES:see med acrd     MEDICATIONS: See MedCard.     PREVIOUS SURGERIES: Appendectomy in , tonsillectomy at age 19, gastric   bypass with incidental cholecystectomy, bilateral knee replacement in .     SOCIAL HISTORY: She is . She had two natural children, and one adopted   one. The adopted child has . She lives in [place] with her   . She smoked for two years, averaging a pack a day. She stopped 35   years ago or so. Denies any alcohol intake. She worked in [employer].     FAMILY HISTORY: Father  of colon cancer. Younger brother had leukemia that  transform into a lymphoma. Sister had pancreatic cancer        Review of Systems   Constitutional: Negative.  Negative for fatigue and fever.   HENT: Negative.    Eyes: Negative.    Respiratory: Negative.  Negative for cough, shortness of breath and wheezing.    Cardiovascular: Negative.  Negative for chest pain.   Gastrointestinal: Negative.  Negative for abdominal pain, diarrhea, nausea and vomiting.   Genitourinary: Negative.    Musculoskeletal: Negative for arthralgias.   Skin: Negative.    Neurological: Negative.    Psychiatric/Behavioral: Negative.        Objective:      Physical Exam   Constitutional: She is oriented to person, place, and time. She appears well-developed. No distress.   HENT:   Head: Normocephalic.   Right Ear: Tympanic membrane  and ear canal normal.   Left Ear: Tympanic membrane and ear canal normal.   Nose: Nose normal. Right sinus exhibits no maxillary sinus tenderness and no frontal sinus tenderness. Left sinus exhibits no maxillary sinus tenderness and no frontal sinus tenderness.   Mouth/Throat: Mucous membranes are normal.   Teeth normal.  Gums normal.   Eyes: Conjunctivae and lids are normal. Pupils are equal, round, and reactive to light.   Neck: Normal range of motion. Normal carotid pulses, no hepatojugular reflux and no JVD present. Carotid bruit is not present. No tracheal deviation present. No thyroid mass and no thyromegaly present.   Cardiovascular: Normal rate, regular rhythm, S1 normal, S2 normal and normal heart sounds. Exam reveals no gallop and no friction rub.   No murmur heard.  Carotid exam normal   Pulmonary/Chest: Effort normal and breath sounds normal. No accessory muscle usage. No respiratory distress. She has no wheezes. She has no rales. She exhibits no tenderness.   Abdominal: Soft. Normal appearance. She exhibits no distension and no mass. There is no splenomegaly or hepatomegaly. There is no tenderness. There is no rebound and no guarding.   Musculoskeletal: Normal range of motion. She exhibits no edema or tenderness.        Right hand: Normal.        Left hand: Normal.   Nails normal   Lymphadenopathy:     She has no cervical adenopathy.   Neurological: She is alert and oriented to person, place, and time. No cranial nerve deficit. Coordination normal.   Skin: Skin is warm and dry. No rash noted. She is not diaphoretic. No erythema. No pallor.   Psychiatric: She has a normal mood and affect. Her behavior is normal. Judgment and thought content normal.       Lab Results   Component Value Date    WBC 6.44 11/14/2018    HGB 10.6 (L) 11/14/2018    HCT 34.4 (L) 11/14/2018     (H) 11/14/2018     11/14/2018     Lab Results   Component Value Date    CREATININE 1.1 11/14/2018    CREATININE 1.1  11/14/2018     Lab Results   Component Value Date    ALT 79 (H) 11/14/2018    ALT 79 (H) 11/14/2018     (H) 11/14/2018     (H) 11/14/2018    ALKPHOS 88 11/14/2018    ALKPHOS 88 11/14/2018    BILITOT 0.3 11/14/2018    BILITOT 0.3 11/14/2018       Assessment:       1. 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 Chronic anemia    3. Chemotherapy-induced neuropathy        Plan:       Is noticed she remains anemic. Will have her continue iron. Needs endoscopies as scheduled.  Potassium is up. She has her potassium increased recently after diuretics were increased. Patient asked to stop potassium.  See me back in 3 months with a cbc/cmp and tibc/ferritin  CT/PET due 3/2018

## 2018-11-14 NOTE — PROGRESS NOTES
Subjective:       Patient ID: Violet Swenson is a 78 y.o. female.    Chief Complaint: Follow-up    HPI This 78 year old  lady  comes for follow up of her th diffuse large cell lymphoma.   A CT of   the abdomen done on 04/05/2017, done because of abdominal pain ,  was reported as showing a 6.9 x 7.0 x 8.4 cm soft  tissue mass in the right lower quadrant in the terminal ileum abutting the   cecum. There was adjacent conglomerate adenopathy in the right lower quadrant   mesentery.     The patient had a colonoscopy that showed a lesion in the terminal ileum.     She underwent a right hemicolectomy and terminal ileum removal. 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  She had a Ct/PET that showed evidence of surgery and some non specific findings, but no evidence of activer disease elsewhere.  An ECHO showed normal cardiac ejection fraction, as wella s a MUGA.  She was seen cardiology and cleared for ADRIAMYCIN administration.  She was negative for Hep B/C and HIV  Bone marrow was negative.     The patient started  R-CHOP. Ttreatmment  She tolerated the treatment with some minor difficulties. After 3 cycles, she had a repeat CT/PET  There was no activity in the abdomen.  There was development of ground glass opacities/infiltrates in both lungs which were hypermetabolic although the SUV was not reported.  We discussed the imaging studies with Radiology and Pulmonary.  Dr Corral of the Pulmonary Department favored the findings to be due to pulmonary congestion.  Her troponin was  normal, but her BNP was markedly elevated at 1,103.  She was started on Lasix by dr Corral and asked to have a  Ct in few weeks  The patient   had evaluation by Cardiology ( dr Gil).It was felt had developed CHF., with a decrease in her ejection raction to 47%., elevated BNP and imaging studies.  The patient indicated her leg  edema and SOB   improved on lasix. Repeat PET showed  clearance of all the infiltrates  Since, she has had a complete work including cardiac catheterization wirh negative findings.  A repeat CB/PET done  showed   clearance of the previous infiltrates       She comes for follow up. She had a CT/PET in 2018 that shows no evidence of recurrence. Repeat CT/PET 2018 showed also no evidence of recurrence  She has neuropathy of hands, with numbness and some pain.  She is on Neurontin 200 mg tid  During the last visit she was found to have iron defieincy anemia and placed on iron. She is due to have endoscopies later on this month   ALLERGIES:see med acrd     MEDICATIONS: See MedCard.     PREVIOUS SURGERIES: Appendectomy in , tonsillectomy at age 19, gastric   bypass with incidental cholecystectomy, bilateral knee replacement in .     SOCIAL HISTORY: She is . She had two natural children, and one adopted   one. The adopted child has . She lives in [place] with her   . She smoked for two years, averaging a pack a day. She stopped 35   years ago or so. Denies any alcohol intake. She worked in [employer].     FAMILY HISTORY: Father  of colon cancer. Younger brother had leukemia that  transform into a lymphoma. Sister had pancreatic cancer        Review of Systems   Constitutional: Negative.  Negative for fatigue and fever.   HENT: Negative.    Eyes: Negative.    Respiratory: Negative.  Negative for cough, shortness of breath and wheezing.    Cardiovascular: Negative.  Negative for chest pain.   Gastrointestinal: Negative.  Negative for abdominal pain, diarrhea, nausea and vomiting.   Genitourinary: Negative.    Musculoskeletal: Negative for arthralgias.   Skin: Negative.    Neurological: Negative.    Psychiatric/Behavioral: Negative.        Objective:      Physical Exam   Constitutional: She is oriented to person, place, and time. She appears well-developed. No distress.   HENT:   Head: Normocephalic.   Right Ear: Tympanic membrane  and ear canal normal.   Left Ear: Tympanic membrane and ear canal normal.   Nose: Nose normal. Right sinus exhibits no maxillary sinus tenderness and no frontal sinus tenderness. Left sinus exhibits no maxillary sinus tenderness and no frontal sinus tenderness.   Mouth/Throat: Mucous membranes are normal.   Teeth normal.  Gums normal.   Eyes: Conjunctivae and lids are normal. Pupils are equal, round, and reactive to light.   Neck: Normal range of motion. Normal carotid pulses, no hepatojugular reflux and no JVD present. Carotid bruit is not present. No tracheal deviation present. No thyroid mass and no thyromegaly present.   Cardiovascular: Normal rate, regular rhythm, S1 normal, S2 normal and normal heart sounds. Exam reveals no gallop and no friction rub.   No murmur heard.  Carotid exam normal   Pulmonary/Chest: Effort normal and breath sounds normal. No accessory muscle usage. No respiratory distress. She has no wheezes. She has no rales. She exhibits no tenderness.   Abdominal: Soft. Normal appearance. She exhibits no distension and no mass. There is no splenomegaly or hepatomegaly. There is no tenderness. There is no rebound and no guarding.   Musculoskeletal: Normal range of motion. She exhibits no edema or tenderness.        Right hand: Normal.        Left hand: Normal.   Nails normal   Lymphadenopathy:     She has no cervical adenopathy.   Neurological: She is alert and oriented to person, place, and time. No cranial nerve deficit. Coordination normal.   Skin: Skin is warm and dry. No rash noted. She is not diaphoretic. No erythema. No pallor.   Psychiatric: She has a normal mood and affect. Her behavior is normal. Judgment and thought content normal.       Lab Results   Component Value Date    WBC 6.44 11/14/2018    HGB 10.6 (L) 11/14/2018    HCT 34.4 (L) 11/14/2018     (H) 11/14/2018     11/14/2018     Lab Results   Component Value Date    CREATININE 1.1 11/14/2018    CREATININE 1.1  11/14/2018     Lab Results   Component Value Date    ALT 79 (H) 11/14/2018    ALT 79 (H) 11/14/2018     (H) 11/14/2018     (H) 11/14/2018    ALKPHOS 88 11/14/2018    ALKPHOS 88 11/14/2018    BILITOT 0.3 11/14/2018    BILITOT 0.3 11/14/2018       Assessment:       1. 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 Chronic anemia    3. Chemotherapy-induced neuropathy        Plan:       Is noticed she remains anemic. Will have her continue iron. Needs endoscopies as scheduled.  Potassium is up. She has her potassium increased recently after diuretics were increased. Patient asked to stop potassium.  See me back in 3 months with a cbc/cmp and tibc/ferritin  CT/PET due 3/2018

## 2018-11-23 DIAGNOSIS — D50.8 OTHER IRON DEFICIENCY ANEMIA: ICD-10-CM

## 2018-11-26 DIAGNOSIS — C85.83 LARGE CELL LYMPHOMA OF INTRA-ABDOMINAL LYMPH NODES: ICD-10-CM

## 2018-11-26 DIAGNOSIS — F41.8 SITUATIONAL ANXIETY: ICD-10-CM

## 2018-11-26 DIAGNOSIS — G62.9 NEUROPATHY: ICD-10-CM

## 2018-11-26 RX ORDER — ALPRAZOLAM 0.25 MG/1
TABLET ORAL
Qty: 60 TABLET | Refills: 0 | Status: SHIPPED | OUTPATIENT
Start: 2018-11-26 | End: 2018-12-03 | Stop reason: SDUPTHER

## 2018-11-28 ENCOUNTER — HOSPITAL ENCOUNTER (OUTPATIENT)
Facility: HOSPITAL | Age: 78
Discharge: HOME | End: 2018-11-28
Attending: INTERNAL MEDICINE | Admitting: INTERNAL MEDICINE
Payer: MEDICARE

## 2018-11-28 ENCOUNTER — ANESTHESIA EVENT (OUTPATIENT)
Dept: ENDOSCOPY | Facility: HOSPITAL | Age: 78
End: 2018-11-28
Payer: MEDICARE

## 2018-11-28 ENCOUNTER — ANESTHESIA (OUTPATIENT)
Dept: ENDOSCOPY | Facility: HOSPITAL | Age: 78
End: 2018-11-28
Payer: MEDICARE

## 2018-11-28 VITALS
WEIGHT: 158 LBS | BODY MASS INDEX: 26.98 KG/M2 | HEIGHT: 64 IN | DIASTOLIC BLOOD PRESSURE: 68 MMHG | OXYGEN SATURATION: 98 % | HEART RATE: 74 BPM | SYSTOLIC BLOOD PRESSURE: 147 MMHG | RESPIRATION RATE: 18 BRPM | TEMPERATURE: 98 F

## 2018-11-28 DIAGNOSIS — K28.7 CHRONIC GASTROJEJUNAL ANASTOMOTIC ULCER: Primary | ICD-10-CM

## 2018-11-28 LAB — POTASSIUM SERPL-SCNC: 4 MMOL/L

## 2018-11-28 PROCEDURE — 84132 ASSAY OF SERUM POTASSIUM: CPT | Mod: HCNC

## 2018-11-28 PROCEDURE — 88305 TISSUE EXAM BY PATHOLOGIST: CPT | Mod: 26,HCNC,, | Performed by: PATHOLOGY

## 2018-11-28 PROCEDURE — 43239 EGD BIOPSY SINGLE/MULTIPLE: CPT | Mod: HCNC | Performed by: INTERNAL MEDICINE

## 2018-11-28 PROCEDURE — 88305 TISSUE EXAM BY PATHOLOGIST: CPT | Mod: HCNC | Performed by: PATHOLOGY

## 2018-11-28 PROCEDURE — 25000003 PHARM REV CODE 250: Mod: HCNC | Performed by: INTERNAL MEDICINE

## 2018-11-28 PROCEDURE — 63600175 PHARM REV CODE 636 W HCPCS: Mod: HCNC | Performed by: NURSE ANESTHETIST, CERTIFIED REGISTERED

## 2018-11-28 PROCEDURE — 45378 DIAGNOSTIC COLONOSCOPY: CPT | Mod: HCNC,,, | Performed by: INTERNAL MEDICINE

## 2018-11-28 PROCEDURE — 45378 DIAGNOSTIC COLONOSCOPY: CPT | Mod: HCNC | Performed by: INTERNAL MEDICINE

## 2018-11-28 PROCEDURE — 43239 EGD BIOPSY SINGLE/MULTIPLE: CPT | Mod: 51,HCNC,, | Performed by: INTERNAL MEDICINE

## 2018-11-28 PROCEDURE — 27201012 HC FORCEPS, HOT/COLD, DISP: Mod: HCNC | Performed by: INTERNAL MEDICINE

## 2018-11-28 PROCEDURE — 37000008 HC ANESTHESIA 1ST 15 MINUTES: Mod: HCNC | Performed by: INTERNAL MEDICINE

## 2018-11-28 PROCEDURE — 36415 COLL VENOUS BLD VENIPUNCTURE: CPT | Mod: HCNC

## 2018-11-28 PROCEDURE — 37000009 HC ANESTHESIA EA ADD 15 MINS: Mod: HCNC | Performed by: INTERNAL MEDICINE

## 2018-11-28 RX ORDER — PROPOFOL 10 MG/ML
INJECTION, EMULSION INTRAVENOUS
Status: DISCONTINUED | OUTPATIENT
Start: 2018-11-28 | End: 2018-11-28

## 2018-11-28 RX ORDER — IRON,CARBONYL/ASCORBIC ACID 100-250 MG
TABLET ORAL
Qty: 30 TABLET | Refills: 3 | Status: SHIPPED | OUTPATIENT
Start: 2018-11-28 | End: 2019-02-27 | Stop reason: SDUPTHER

## 2018-11-28 RX ORDER — SODIUM CHLORIDE, SODIUM LACTATE, POTASSIUM CHLORIDE, CALCIUM CHLORIDE 600; 310; 30; 20 MG/100ML; MG/100ML; MG/100ML; MG/100ML
INJECTION, SOLUTION INTRAVENOUS CONTINUOUS
Status: DISCONTINUED | OUTPATIENT
Start: 2018-11-28 | End: 2018-11-28 | Stop reason: HOSPADM

## 2018-11-28 RX ORDER — LIDOCAINE HCL/PF 100 MG/5ML
SYRINGE (ML) INTRAVENOUS
Status: DISCONTINUED | OUTPATIENT
Start: 2018-11-28 | End: 2018-11-28

## 2018-11-28 RX ADMIN — PROPOFOL 30 MG: 10 INJECTION, EMULSION INTRAVENOUS at 02:11

## 2018-11-28 RX ADMIN — PROPOFOL 120 MG: 10 INJECTION, EMULSION INTRAVENOUS at 02:11

## 2018-11-28 RX ADMIN — SODIUM CHLORIDE, SODIUM LACTATE, POTASSIUM CHLORIDE, AND CALCIUM CHLORIDE: 600; 310; 30; 20 INJECTION, SOLUTION INTRAVENOUS at 02:11

## 2018-11-28 RX ADMIN — LIDOCAINE HYDROCHLORIDE 100 MG: 20 INJECTION, SOLUTION INTRAVENOUS at 02:11

## 2018-11-28 RX ADMIN — PROPOFOL 30 MG: 10 INJECTION, EMULSION INTRAVENOUS at 03:11

## 2018-11-28 RX ADMIN — SODIUM CHLORIDE, SODIUM LACTATE, POTASSIUM CHLORIDE, AND CALCIUM CHLORIDE: 600; 310; 30; 20 INJECTION, SOLUTION INTRAVENOUS at 11:11

## 2018-11-28 NOTE — ANESTHESIA RELEASE NOTE
"Anesthesia Release from PACU Note    Patient: Violte Swenson    Procedure(s) Performed: Procedure(s) (LRB):  EGD (ESOPHAGOGASTRODUODENOSCOPY) (N/A)  COLONOSCOPY (N/A)    Anesthesia type: MAC    Post pain: Adequate analgesia    Post assessment: no apparent anesthetic complications, tolerated procedure well and no evidence of recall    Last Vitals:   Visit Vitals  BP (!) 150/74 (BP Location: Right leg, Patient Position: Lying)   Pulse 88   Temp 36.4 °C (97.5 °F) (Oral)   Resp 18   Ht 5' 4" (1.626 m)   Wt 71.7 kg (158 lb)   SpO2 95%   Breastfeeding? No   BMI 27.12 kg/m²       Post vital signs: stable    Level of consciousness: awake, alert  and oriented    Nausea/Vomiting: no nausea/no vomiting    Complications: none    Airway Patency: patent    Respiratory: unassisted, spontaneous ventilation, room air    Cardiovascular: stable    Hydration: euvolemic  "

## 2018-11-28 NOTE — ANESTHESIA PREPROCEDURE EVALUATION
11/28/2018  Violet Swenson is a 78 y.o., female.    Anesthesia Evaluation    I have reviewed the Patient Summary Reports.    I have reviewed the Nursing Notes.   I have reviewed the Medications.     Review of Systems  Anesthesia Hx:  No problems with previous Anesthesia    Social:  Non-Smoker, No Alcohol Use    Hematology/Oncology:         -- Anemia: --  Cancer in past history:  chemotherapy and surgery    EENT/Dental:   chronic allergic rhinitis   Cardiovascular:   Exercise tolerance: poor Pacemaker Hypertension, well controlled CAD   ECG has been reviewed. Stent x1 in 2013  CAD  Pacemaker  CONCLUSIONS     1 - Low normal to mildly depressed left ventricular systolic function (EF 50-55%).     2 - Normal left ventricular diastolic function.     3 - Upper limit of normal ascending aorta.     4 - No wall motion abnormalities.     5 - Normal right ventricular systolic function .     6 - Trivial mitral regurgitation.   AV dual-paced rhythm  Abnormal ECG  When compared with ECG of 01-SEP-2017 11:30,  Vent. rate has decreased BY   8 BPM  Confirmed by JOSETTE MOSLEY MD ([id number]) on 7/31/2018 5:20:03 PM   Pulmonary:   Pneumonia 2013 pneumonia   Renal/:   Chronic Renal Disease    Hepatic/GI:   Bowel Prep. GERD, well controlled 0830 last drink of fluid.   Musculoskeletal:   Arthritis  Chronic midline low back pain with right-sided sciatica   Neurological:   Neuromuscular Disease,    Endocrine:   Hypothyroidism    Psych:   Psychiatric History          Physical Exam  General:  Well nourished    Airway/Jaw/Neck:  Airway Findings: Mallampati: III                Anesthesia Plan  Type of Anesthesia, risks & benefits discussed:  Anesthesia Type:  MAC  Patient's Preference:   Intra-op Monitoring Plan:   Intra-op Monitoring Plan Comments:   Post Op Pain Control Plan:   Post Op Pain Control Plan Comments:   Induction:    IV  Beta Blocker:  Patient is on a Beta-Blocker and has received one dose within the past 24 hours (No further documentation required).       Informed Consent: Patient understands risks and agrees with Anesthesia plan.  Questions answered. Anesthesia consent signed with patient.  ASA Score: 3     Day of Surgery Review of History & Physical: I have interviewed and examined the patient. I have reviewed the patient's H&P dated: 11/28/18. There are no significant changes.  H&P update referred to the surgeon.         Ready For Surgery From Anesthesia Perspective.

## 2018-11-28 NOTE — [employer] PATIENT INSTRUCTIONS
Discharge Summary/Instructions after an Endoscopic Procedure  Patient Name: Violet Swenson  Patient MRN: [id number]  Patient YOB: 1940  Wednesday, November 28, 2018 Saúl Arthur III, MD  RESTRICTIONS:  During your procedure today, you received medications for sedation.  These   medications may affect your judgment, balance and coordination.  Therefore,   for 24 hours, you have the following restrictions:   - DO NOT drive a car, operate machinery, make legal/financial decisions,   sign important papers or drink alcohol.    ACTIVITY:  Today: no heavy lifting, straining or running due to procedural   sedation/anesthesia.  The following day: return to full activity including work.  DIET:  Eat and drink normally unless instructed otherwise.     TREATMENT FOR COMMON SIDE EFFECTS:  - Mild abdominal pain, nausea, belching, bloating or excessive gas:  rest,   eat lightly and use a heating pad.  - Sore Throat: treat with throat lozenges and/or gargle with warm salt   water.  - Because air was used during the procedure, expelling large amounts of air   from your rectum or belching is normal.  - If a bowel prep was taken, you may not have a bowel movement for 1-3 days.    This is normal.  SYMPTOMS TO WATCH FOR AND REPORT TO YOUR PHYSICIAN:  1. Abdominal pain or bloating, other than gas cramps.  2. Chest pain.  3. Back pain.  4. Signs of infection such as: chills or fever occurring within 24 hours   after the procedure.  5. Rectal bleeding, which would show as bright red, maroon, or black stools.   (A tablespoon of blood from the rectum is not serious, especially if   hemorrhoids are present.)  6. Vomiting.  7. Weakness or dizziness.  GO DIRECTLY TO THE NEAREST EMERGENCY ROOM IF YOU HAVE ANY OF THE FOLLOWING:      Difficulty breathing              Chills and/or fever over 101 F   Persistent vomiting and/or vomiting blood   Severe abdominal pain   Severe chest pain   Black, tarry stools   Bleeding- more than one  tablespoon   Any other symptom or condition that you feel may need urgent attention  Your doctor recommends these additional instructions:  If any biopsies were taken, your doctors clinic will contact you in 1 to 2   weeks with any results.  - Discharge patient to home (via wheelchair).   - High fiber diet.   - Continue present medications.   - Repeat colonoscopy in 3 years for surveillance.   - Return to primary care physician as previously scheduled.  For questions, problems or results please call your physician Saúl Arthur III, MD at Work:  [phone]  If you have any questions about the above instructions, call the GI   department at [phone] or call the endoscopy unit at [phone]   from 7am until 3 pm.  [facility] [place], EMERGENCY ROOM PHONE NUMBER:   [phone]  IF A COMPLICATION OR EMERGENCY SITUATION ARISES AND YOU ARE UNABLE TO REACH   YOUR PHYSICIAN - GO DIRECTLY TO THE EMERGENCY ROOM.  I have read or have had read to me these discharge instructions for my   procedure and have received a written copy.  I understand these   instructions and will follow-up with my physician if I have any questions.     __________________________________       _____________________________________  Nurse Signature                                          Patient/Designated   Responsible Party Signature  Saúl Arthur III, MD  11/28/2018 3:36:24 PM  This report has been verified and signed electronically.  [employer]

## 2018-11-28 NOTE — DISCHARGE SUMMARY
[facility]  Brief Operative Note     SUMMARY     Surgery Date: 11/28/2018     Surgeon(s) and Role:     * Saúl Arthur III, MD - Primary    Assisting Surgeon: None    Pre-op Diagnosis:  Iron deficiency anemia due to chronic blood loss [D50.0]    Post-op Diagnosis:  Post-Op Diagnosis Codes:     * Iron deficiency anemia due to chronic blood loss [D50.0]      - Gastrojejunal  Anastomotic Ulcers  Procedure(s) (LRB):  EGD (ESOPHAGOGASTRODUODENOSCOPY) (N/A)  COLONOSCOPY (N/A)    Anesthesia: Monitor Anesthesia Care    Description of the findings of the procedure: Procedures completed. See Procedure note for full details.    Findings/Key Components: Procedures completed. See Procedure note for full details.    Prosthetics/Devices: None    Estimated Blood Loss: * No values recorded between 11/28/2018 12:00 AM and 11/28/2018  3:52 PM *         Specimens:   Specimen (12h ago, onward)    Start     Ordered    11/28/18 1449  Specimen to Pathology - Surgery  Once     Comments:  1. Gastric anastamosis ulcer biopsy     Start Status   11/28/18 1449 Collected (11/28/18 1514)       11/28/18 1513          Discharge Note    SUMMARY     Admit Date: 11/28/2018    Discharge Date and Time: 11/28/2018    Hospital Course (synopsis of major diagnoses, care, treatment, and services provided during the course of the hospital stay):  Procedures completed. See Procedure note for full details. Discharge patient when discharge criteria met.    Final Diagnosis: Post-Op Diagnosis Codes:     * Iron deficiency anemia due to chronic blood loss [D50.0]      Gastrojejunal anastomotic Ulcers  Disposition: Discharge patient when discharge criteria met.    Follow Up/Patient Instructions:       Medications:  Reconciled Home Medications:   Current Discharge Medication List      CONTINUE these medications which have NOT CHANGED    Details   allopurinol (ZYLOPRIM) 300 MG tablet Take 1 tablet (300 mg total) by mouth once daily.  Qty: 90 tablet,  Refills: 3    Associated Diagnoses: Idiopathic chronic gout of multiple sites without tophus      ALPRAZolam (XANAX) 0.25 MG tablet TAKE ONE TABLET BY MOUTH TWICE DAILY AS NEEDED FOR ANXIETY  Qty: 60 tablet, Refills: 0    Associated Diagnoses: Situational anxiety      ascorbic acid, vitamin C, (VITAMIN C) 100 MG tablet Take by mouth once daily.      calcitRIOL (ROCALTROL) 0.25 MCG Cap Take 1 capsule (0.25 mcg total) by mouth every Mon, Wed, Fri.  Qty: 12 capsule, Refills: 5      COLCRYS 0.6 mg tablet Take 1 tablet (0.6 mg total) by mouth once daily.  Qty: 30 tablet, Refills: 11      ergocalciferol (ERGOCALCIFEROL) 50,000 unit Cap Take 1 capsule (50,000 Units total) by mouth every 7 days.  Qty: 12 capsule, Refills: 3    Associated Diagnoses: Vitamin D deficiency      fluticasone (FLONASE) 50 mcg/actuation nasal spray 2 sprays by Each Nare route once daily.  Qty: 16 g, Refills: 11    Associated Diagnoses: Seasonal allergic rhinitis, unspecified chronicity, unspecified trigger      furosemide (LASIX) 40 MG tablet Take 1 tab daily and an extra tab as needed for edema  Qty: 40 tablet, Refills: 6    Associated Diagnoses: Diffusion capacity of lung (dl), decreased; Ischemic cardiomyopathy      gabapentin (NEURONTIN) 300 MG capsule Take 1 capsule (300 mg total) by mouth 3 (three) times daily.  Qty: 90 capsule, Refills: 1    Comments: This prescription was filled on 7/27/2018. Any refills authorized will be placed on file.  Associated Diagnoses: Neuropathy; Right lumbar radiculitis      iron-vitamin C 100-250 mg, ICAR-C, 100-250 mg Tab TAKE ONE TABLET BY MOUTH EVERY DAY  Qty: 30 tablet, Refills: 3    Associated Diagnoses: Other iron deficiency anemia      isosorbide mononitrate (IMDUR) 30 MG 24 hr tablet TAKE ONE TABLET BY MOUTH AT BEDTIME  Qty: 30 tablet, Refills: 11    Associated Diagnoses: Coronary artery disease involving native coronary artery of native heart with unstable angina pectoris      levothyroxine (SYNTHROID)  75 MCG tablet TAKE 1 TABLET(75 MCG) BY MOUTH BEFORE BREAKFAST  Qty: 90 tablet, Refills: 1    Associated Diagnoses: Hypothyroidism (acquired)      loratadine (CLARITIN) 10 mg tablet Take 1 tablet (10 mg total) by mouth once daily.  Refills: 0    Associated Diagnoses: Seasonal allergic rhinitis, unspecified chronicity, unspecified trigger      meloxicam (MOBIC) 7.5 MG tablet TAKE ONE TABLET BY MOUTH EVERY DAY AS NEEDED FOR PAIN  Qty: 90 tablet, Refills: 3    Associated Diagnoses: Primary osteoarthritis involving multiple joints      metoprolol tartrate (LOPRESSOR) 25 MG tablet TAKE ONE TABLET BY MOUTH TWICE DAILY  Qty: 60 tablet, Refills: 2      mirtazapine (REMERON SOL-TAB) 15 MG disintegrating tablet DISSOLVE ONE TABLET BY MOUTH NIGHTLY  Qty: 30 tablet, Refills: 2    Comments: This prescription was filled on 10/25/2018. Any refills authorized will be placed on file.  Associated Diagnoses: Primary insomnia      multivitamin (ONE DAILY MULTIVITAMIN) per tablet Take 1 tablet by mouth once daily.      potassium chloride SA (K-DUR,KLOR-CON) 20 MEQ tablet Take 1 tablet (20 mEq total) by mouth once daily.  Qty: 30 tablet, Refills: 6      ranitidine (ZANTAC) 150 MG capsule Take 150 mg by mouth nightly.       rosuvastatin (CRESTOR) 10 MG tablet TAKE ONE TABLET BY MOUTH EVERY EVENING  Qty: 30 tablet, Refills: 6    Comments: This prescription was filled on 7/27/2018. Any refills authorized will be placed on file.  Associated Diagnoses: Hyperlipidemia, unspecified hyperlipidemia type      sertraline (ZOLOFT) 100 MG tablet TAKE 1.5 TABLETS BY MOUTH ONCE DAILY  Qty: 90 tablet, Refills: 1    Associated Diagnoses: Major depression, chronic; Situational anxiety; Insomnia secondary to anxiety      sodium bicarbonate 650 MG tablet Take 1 tablet (650 mg total) by mouth 2 (two) times daily.  Qty: 60 tablet, Refills: 11      ZINC ACETATE ORAL Take 250 mg by mouth once daily.       aspirin (ECOTRIN) 81 MG EC tablet Take 81 mg by mouth  nightly.       clopidogrel (PLAVIX) 75 mg tablet TAKE ONE TABLET BY MOUTH EVERY DAY  Qty: 90 tablet, Refills: 3    Comments: This prescription was filled on 1/10/2018. Any refills authorized will be placed on file.  Associated Diagnoses: Coronary artery disease without angina pectoris, unspecified vessel or lesion type, unspecified whether native or transplanted heart      diclofenac sodium 1 % Gel Apply 2 g topically once daily. Apply 2 g over painful joints once or twice a day.  Qty: 100 g, Refills: 6    Associated Diagnoses: Unilateral osteoarthritis of first carpometacarpal (CMC) joint; Primary osteoarthritis involving multiple joints      losartan (COZAAR) 25 MG tablet Take 1 tablet (25 mg total) by mouth once daily.  Qty: 30 tablet, Refills: 11    Associated Diagnoses: CAD, multiple vessel; S/P coronary artery stent placement; Ischemic cardiomyopathy; Lymphoma, unspecified body region, unspecified lymphoma type         STOP taking these medications       sodium,potassium,mag sulfates (SUPREP BOWEL PREP KIT) 17.5-3.13-1.6 gram SolR Comments:   Reason for Stopping:              Discharge Procedure Orders   Diet general     Activity as tolerated

## 2018-11-28 NOTE — TRANSFER OF CARE
"Anesthesia Transfer of Care Note    Patient: Violet Swenson    Procedure(s) Performed: Procedure(s) (LRB):  EGD (ESOPHAGOGASTRODUODENOSCOPY) (N/A)  COLONOSCOPY (N/A)    Patient location: PACU    Anesthesia Type: MAC    Transport from OR: Transported from OR on room air with adequate spontaneous ventilation    Post pain: adequate analgesia    Post assessment: no apparent anesthetic complications    Post vital signs: stable    Level of consciousness: awake    Nausea/Vomiting: no nausea/vomiting    Complications: none    Transfer of care protocol was followed      Last vitals:   Visit Vitals  BP (!) 150/74 (BP Location: Right leg, Patient Position: Lying)   Pulse 88   Temp 36.4 °C (97.5 °F) (Oral)   Resp 18   Ht 5' 4" (1.626 m)   Wt 71.7 kg (158 lb)   SpO2 95%   Breastfeeding? No   BMI 27.12 kg/m²     "

## 2018-11-28 NOTE — OR NURSING
Bed repositioned; will begin colonoscopy; pt adequately sedated; final timeout done and agreed upon by all staff

## 2018-11-28 NOTE — INTERVAL H&P NOTE
The patient has been examined and the H&P has been reviewed:I have reviewed this note and I agree with this assessment. The patient was seen in the GI office and remains stable for endoscopy at the time of this present evaluation. Her anemia turned out to be iron deficiency. Will need EGD and Colonoscopy.         Anesthesia/Surgery risks, benefits and alternative options discussed and understood by patient/family.          There are no hospital problems to display for this patient.

## 2018-11-28 NOTE — INTERVAL H&P NOTE
"The patient has been examined and the H&P has been reviewed:  Family History   Problem Relation Age of Onset    Heart disease Mother     Hypertension Mother     Cataracts Mother     Stomach cancer Father         "ulcers that turned to cancer"    Cancer Father         stomach    Pancreatic cancer Sister     Cancer Sister         pancreatic    Leukemia Brother         "leukemia which led to intestinal cancer"    Cataracts Brother     Cancer Brother         leukemia then later stomach cancer    Drug abuse Son     Cancer Son         prostate cancer    Prostate cancer Son     COPD Daughter     Asthma Daughter     Hypertension Maternal Grandfather     Stroke Maternal Grandfather     Alcohol abuse Neg Hx     Diabetes Neg Hx     Mental retardation Neg Hx     Mental illness Neg Hx      Past Medical History:   Diagnosis Date    Age-related osteoporosis without current pathological fracture 8/20/2018    Anemia     Anxiety     Arthritis     Cancer     lymphoma Large cell B    Chronic anemia 4/26/2017    Chronic midline low back pain with right-sided sciatica 8/20/2018    Coronary artery disease     01/2015 LHC patent LCX. 50% stenosis in LAD and RCA.      Depression     Disorder of kidney and ureter     Encounter for blood transfusion     GERD (gastroesophageal reflux disease)     Gout, arthritis     Heart failure     Hx of psychiatric care     Hyperlipidemia     Hypertension     Hypothyroidism     Immune deficiency disorder     Kidney disease     Lung nodule 2014    RML--stable    Obesity     Pacemaker     Metronic    Paroxysmal atrial fibrillation 3/15/2018    Pneumonia     Polyneuropathy     chemo induced    Psychiatric problem     Tobacco dependence     quit 1976    Trouble in sleeping      Past Surgical History:   Procedure Laterality Date    APPENDECTOMY  1966 approx    BIOPSY-BONE MARROW N/A 5/15/2017    [facility]    CARDIAC " PACEMAKER PLACEMENT  2015    CHOLECYSTECTOMY  1993    incidental at time of gastric bypass    COLECTOMY-PARTIAL N/A 2017    Performed by Orlando [facility]ton [facility] at [facility] OR    COLON SURGERY Right 2017    hemicolectomy    COLONOSCOPY N/A 2017    Procedure: COLONOSCOPY;  Surgeon: Tye Enamorado MD;  Location: [facility] ENDO;  Service: Endoscopy;  Laterality: N/A;    COLONOSCOPY N/A 2017    Performed by Tye Enamorado MD at [facility] ENDO    CORONARY ANGIOPLASTY  2014    CORONARY STENT PLACEMENT  2014    EXPLORATORY-LAPAROTOMY N/A 2017    Performed by Orlando [facility]ton [facility] at [facility] OR    GASTRIC BYPASS      with incidental choly    HEART CATH-BILATERAL N/A 2017    Performed by Nader Gil MD at [facility] CATH LAB    HERNIA REPAIR      [id number]-CATH N/A 5/15/2017    Performed by Orlando [facility]oulton [facility] at [facility] OR    JOINT REPLACEMENT Bilateral     3 months apart    LYSIS-ADHESION N/A 2017    Performed by Orlando Moulton MD at [facility] OR    REMOVAL-PORT-A-CATH N/A 2018    Performed by Nima Abdullahi MD at [facility] CATH LAB    TONSILLECTOMY       Social History     Socioeconomic History    Marital status:      Spouse name: Not on file    Number of children: 4    Years of education: Not on file    Highest education level: Not on file   Social Needs    Financial resource strain: Not on file    Food insecurity - worry: Not on file    Food insecurity - inability: Not on file    Transportation needs - medical: Not on file    Transportation needs - non-medical: Not on file   Occupational History    Not on file   Tobacco Use    Smoking status: Former Smoker     Packs/day: 2.00     Years: 6.00     Pack years: 12.00     Last attempt to quit: 3/15/1976     Years since quittin.7    Smokeless tobacco: Never Used   Substance and Sexual Activity    Alcohol use: No     Alcohol/week: 0.0 oz    Drug use: No    Sexual activity: No   Other Topics Concern    Patient feels  they ought to cut down on drinking/drug use Not Asked    Patient annoyed by others criticizing their drinking/drug use Not Asked    Patient has felt bad or guilty about drinking/drug use Not Asked    Patient has had a drink/used drugs as an eye opener in the AM Not Asked   Social History Narrative    , lives with . She is a retired . 4 children (3 natural born, 1 adopted)- 2 ; 2x  (currently 17 years); son has prostate cancer, daughter COPD,  cardiac difficulty. She still drives. Does not have a Living will or Advanced Directive.     Review of patient's allergies indicates:   Allergen Reactions    Corticosteroids (glucocorticoids) Nausea Only and Other (See Comments)     Stomach pain, dizziness, headache    Oxycodone Other (See Comments)     Blood pressure dropped     No current facility-administered medications on file prior to encounter.      Current Outpatient Medications on File Prior to Encounter   Medication Sig Dispense Refill    allopurinol (ZYLOPRIM) 300 MG tablet Take 1 tablet (300 mg total) by mouth once daily. 90 tablet 3    ascorbic acid, vitamin C, (VITAMIN C) 100 MG tablet Take by mouth once daily.      calcitRIOL (ROCALTROL) 0.25 MCG Cap Take 1 capsule (0.25 mcg total) by mouth every Mon, Wed, Fri. 12 capsule 5    COLCRYS 0.6 mg tablet Take 1 tablet (0.6 mg total) by mouth once daily. 30 tablet 11    ergocalciferol (ERGOCALCIFEROL) 50,000 unit Cap Take 1 capsule (50,000 Units total) by mouth every 7 days. 12 capsule 3    fluticasone (FLONASE) 50 mcg/actuation nasal spray 2 sprays by Each Nare route once daily. (Patient taking differently: 2 sprays by Each Nare route daily as needed. ) 16 g 11    gabapentin (NEURONTIN) 300 MG capsule Take 1 capsule (300 mg total) by mouth 3 (three) times daily. 90 capsule 1    levothyroxine (SYNTHROID) 75 MCG tablet TAKE 1 TABLET(75 MCG) BY MOUTH BEFORE BREAKFAST 90 tablet 1    loratadine (CLARITIN) 10 mg  tablet Take 1 tablet (10 mg total) by mouth once daily.  0    multivitamin (ONE DAILY MULTIVITAMIN) per tablet Take 1 tablet by mouth once daily.      ranitidine (ZANTAC) 150 MG capsule Take 150 mg by mouth nightly.       rosuvastatin (CRESTOR) 10 MG tablet TAKE ONE TABLET BY MOUTH EVERY EVENING 30 tablet 6    sodium bicarbonate 650 MG tablet Take 1 tablet (650 mg total) by mouth 2 (two) times daily. 60 tablet 11    ZINC ACETATE ORAL Take 250 mg by mouth once daily.       aspirin (ECOTRIN) 81 MG EC tablet Take 81 mg by mouth nightly.       clopidogrel (PLAVIX) 75 mg tablet TAKE ONE TABLET BY MOUTH EVERY DAY 90 tablet 3    diclofenac sodium 1 % Gel Apply 2 g topically once daily. Apply 2 g over painful joints once or twice a day. 100 g 6    losartan (COZAAR) 25 MG tablet Take 1 tablet (25 mg total) by mouth once daily. 30 tablet 11    sodium,potassium,mag sulfates (SUPREP BOWEL PREP KIT) 17.5-3.13-1.6 gram SolR As directed for colonoscopy 254 mL 0           Anesthesia/Surgery risks, benefits and alternative options discussed and understood by patient/family.          There are no hospital problems to display for this patient.

## 2018-11-28 NOTE — [employer] PATIENT INSTRUCTIONS
Discharge Summary/Instructions after an Endoscopic Procedure  Patient Name: Violet Swenson  Patient MRN: [id number]  Patient YOB: 1940  Wednesday, November 28, 2018 Saúl Arthur III, MD  RESTRICTIONS:  During your procedure today, you received medications for sedation.  These   medications may affect your judgment, balance and coordination.  Therefore,   for 24 hours, you have the following restrictions:   - DO NOT drive a car, operate machinery, make legal/financial decisions,   sign important papers or drink alcohol.    ACTIVITY:  Today: no heavy lifting, straining or running due to procedural   sedation/anesthesia.  The following day: return to full activity including work.  DIET:  Eat and drink normally unless instructed otherwise.     TREATMENT FOR COMMON SIDE EFFECTS:  - Mild abdominal pain, nausea, belching, bloating or excessive gas:  rest,   eat lightly and use a heating pad.  - Sore Throat: treat with throat lozenges and/or gargle with warm salt   water.  - Because air was used during the procedure, expelling large amounts of air   from your rectum or belching is normal.  - If a bowel prep was taken, you may not have a bowel movement for 1-3 days.    This is normal.  SYMPTOMS TO WATCH FOR AND REPORT TO YOUR PHYSICIAN:  1. Abdominal pain or bloating, other than gas cramps.  2. Chest pain.  3. Back pain.  4. Signs of infection such as: chills or fever occurring within 24 hours   after the procedure.  5. Rectal bleeding, which would show as bright red, maroon, or black stools.   (A tablespoon of blood from the rectum is not serious, especially if   hemorrhoids are present.)  6. Vomiting.  7. Weakness or dizziness.  GO DIRECTLY TO THE NEAREST EMERGENCY ROOM IF YOU HAVE ANY OF THE FOLLOWING:      Difficulty breathing              Chills and/or fever over 101 F   Persistent vomiting and/or vomiting blood   Severe abdominal pain   Severe chest pain   Black, tarry stools   Bleeding- more than one  tablespoon   Any other symptom or condition that you feel may need urgent attention  Your doctor recommends these additional instructions:  If any biopsies were taken, your doctors clinic will contact you in 1 to 2   weeks with any results.  - Discharge patient to home (via wheelchair).   - Resume previous diet.   - Continue present medications.   - Await pathology results.   - Return to GI clinic as previously scheduled.  For questions, problems or results please call your physician Saúl Arthur III, MD at Work:  [phone]  If you have any questions about the above instructions, call the GI   department at [phone] or call the endoscopy unit at [phone]   from 7am until 3 pm.  [facility], EMERGENCY ROOM PHONE NUMBER:   [phone]  IF A COMPLICATION OR EMERGENCY SITUATION ARISES AND YOU ARE UNABLE TO REACH   YOUR PHYSICIAN - GO DIRECTLY TO THE EMERGENCY ROOM.  I have read or have had read to me these discharge instructions for my   procedure and have received a written copy.  I understand these   instructions and will follow-up with my physician if I have any questions.     __________________________________       _____________________________________  Nurse Signature                                          Patient/Designated   Responsible Party Signature  Saúl Arthur III, MD  11/28/2018 3:45:48 PM  This report has been verified and signed electronically.  [employer]

## 2018-11-28 NOTE — ANESTHESIA POSTPROCEDURE EVALUATION
"Anesthesia Post Evaluation    Patient: Violet Swenson    Procedure(s) Performed: Procedure(s) (LRB):  EGD (ESOPHAGOGASTRODUODENOSCOPY) (N/A)  COLONOSCOPY (N/A)    Final Anesthesia Type: MAC  Patient location during evaluation: PACU  Patient participation: Yes- Able to Participate  Level of consciousness: awake and alert and oriented  Post-procedure vital signs: reviewed and stable  Pain management: adequate  Airway patency: patent  PONV status at discharge: No PONV  Anesthetic complications: no      Cardiovascular status: blood pressure returned to baseline  Respiratory status: unassisted, room air and spontaneous ventilation  Hydration status: euvolemic  Follow-up not needed.        Visit Vitals  BP (!) 150/74 (BP Location: Right leg, Patient Position: Lying)   Pulse 88   Temp 36.4 °C (97.5 °F) (Oral)   Resp 18   Ht 5' 4" (1.626 m)   Wt 71.7 kg (158 lb)   SpO2 95%   Breastfeeding? No   BMI 27.12 kg/m²       Pain/Prosper Score: Presence of Pain: complains of pain/discomfort (11/28/2018 10:49 AM)        "

## 2018-11-29 ENCOUNTER — TELEPHONE (OUTPATIENT)
Dept: RADIOLOGY | Facility: HOSPITAL | Age: 78
End: 2018-11-29

## 2018-11-30 ENCOUNTER — HOSPITAL ENCOUNTER (OUTPATIENT)
Dept: RADIOLOGY | Facility: HOSPITAL | Age: 78
Discharge: HOME | End: 2018-11-30
Attending: RADIOLOGY
Payer: MEDICARE

## 2018-11-30 ENCOUNTER — HOSPITAL ENCOUNTER (OUTPATIENT)
Dept: RADIOLOGY | Facility: HOSPITAL | Age: 78
Discharge: HOME | End: 2018-11-30
Attending: INTERNAL MEDICINE
Payer: MEDICARE

## 2018-11-30 VITALS
OXYGEN SATURATION: 99 % | BODY MASS INDEX: 27.31 KG/M2 | HEIGHT: 64 IN | TEMPERATURE: 98 F | WEIGHT: 160 LBS | DIASTOLIC BLOOD PRESSURE: 67 MMHG | HEART RATE: 64 BPM | RESPIRATION RATE: 14 BRPM | SYSTOLIC BLOOD PRESSURE: 134 MMHG

## 2018-11-30 DIAGNOSIS — R79.89 ABNORMAL LFTS: ICD-10-CM

## 2018-11-30 PROCEDURE — 88307 TISSUE EXAM BY PATHOLOGIST: CPT | Mod: 26,HCNC,, | Performed by: PATHOLOGY

## 2018-11-30 PROCEDURE — 27200940 CT BIOPSY LIVER (XPD): Mod: HCNC

## 2018-11-30 PROCEDURE — 88341 IMHCHEM/IMCYTCHM EA ADD ANTB: CPT | Mod: 26,HCNC,, | Performed by: PATHOLOGY

## 2018-11-30 PROCEDURE — 88342 IMHCHEM/IMCYTCHM 1ST ANTB: CPT | Mod: 26,HCNC,, | Performed by: PATHOLOGY

## 2018-11-30 PROCEDURE — 63600175 PHARM REV CODE 636 W HCPCS: Mod: HCNC | Performed by: RADIOLOGY

## 2018-11-30 PROCEDURE — 88307 TISSUE EXAM BY PATHOLOGIST: CPT | Mod: HCNC | Performed by: PATHOLOGY

## 2018-11-30 PROCEDURE — 88313 SPECIAL STAINS GROUP 2: CPT | Mod: HCNC | Performed by: PATHOLOGY

## 2018-11-30 PROCEDURE — 88313 SPECIAL STAINS GROUP 2: CPT | Mod: 26,HCNC,, | Performed by: PATHOLOGY

## 2018-11-30 PROCEDURE — 76705 ECHO EXAM OF ABDOMEN: CPT | Mod: TC,HCNC

## 2018-11-30 RX ORDER — MIDAZOLAM HYDROCHLORIDE 1 MG/ML
INJECTION INTRAMUSCULAR; INTRAVENOUS CODE/TRAUMA/SEDATION MEDICATION
Status: COMPLETED | OUTPATIENT
Start: 2018-11-30 | End: 2018-11-30

## 2018-11-30 RX ORDER — FENTANYL CITRATE 50 UG/ML
INJECTION, SOLUTION INTRAMUSCULAR; INTRAVENOUS CODE/TRAUMA/SEDATION MEDICATION
Status: COMPLETED | OUTPATIENT
Start: 2018-11-30 | End: 2018-11-30

## 2018-11-30 RX ORDER — HYDROMORPHONE HYDROCHLORIDE 1 MG/ML
INJECTION, SOLUTION INTRAMUSCULAR; INTRAVENOUS; SUBCUTANEOUS CODE/TRAUMA/SEDATION MEDICATION
Status: COMPLETED | OUTPATIENT
Start: 2018-11-30 | End: 2018-11-30

## 2018-11-30 RX ORDER — DIPHENHYDRAMINE HYDROCHLORIDE 50 MG/ML
INJECTION INTRAMUSCULAR; INTRAVENOUS CODE/TRAUMA/SEDATION MEDICATION
Status: COMPLETED | OUTPATIENT
Start: 2018-11-30 | End: 2018-11-30

## 2018-11-30 RX ORDER — ONDANSETRON 2 MG/ML
INJECTION INTRAMUSCULAR; INTRAVENOUS CODE/TRAUMA/SEDATION MEDICATION
Status: COMPLETED | OUTPATIENT
Start: 2018-11-30 | End: 2018-11-30

## 2018-11-30 RX ADMIN — FENTANYL CITRATE 25 MCG: 50 INJECTION, SOLUTION INTRAMUSCULAR; INTRAVENOUS at 09:11

## 2018-11-30 RX ADMIN — MIDAZOLAM HYDROCHLORIDE 0.5 MG: 1 INJECTION, SOLUTION INTRAMUSCULAR; INTRAVENOUS at 09:11

## 2018-11-30 RX ADMIN — DIPHENHYDRAMINE HYDROCHLORIDE 25 MG: 50 INJECTION, SOLUTION INTRAMUSCULAR; INTRAVENOUS at 10:11

## 2018-11-30 RX ADMIN — ONDANSETRON 4 MG: 2 INJECTION, SOLUTION INTRAMUSCULAR; INTRAVENOUS at 10:11

## 2018-11-30 RX ADMIN — ONDANSETRON 4 MG: 2 INJECTION, SOLUTION INTRAMUSCULAR; INTRAVENOUS at 11:11

## 2018-11-30 RX ADMIN — HYDROMORPHONE HYDROCHLORIDE 1 MG: 1 INJECTION, SOLUTION INTRAMUSCULAR; INTRAVENOUS; SUBCUTANEOUS at 10:11

## 2018-11-30 NOTE — DISCHARGE SUMMARY
Sterile technique was performed in the RUQ, lidocaine was used as a local anesthetic.  Multiple samples taken from the liver core.  Pt tolerated the procedure well without immediate complications.  Please see radiologist report for details. F/u with PCP and/or ordering physician.

## 2018-11-30 NOTE — DISCHARGE INSTRUCTIONS
Recovery After Procedural Sedation (Adult)  You have been given medicine by vein to make you sleep during your surgery. This may have included both a pain medicine and sleeping medicine. Most of the effects have worn off. But you may still have some drowsiness for the next 6 to 8 hours.  Home care  Follow these guidelines when you get home:  · For the next 8 hours, you should be watched by a responsible adult. This person should make sure your condition is not getting worse.  · Don't drink any alcohol for the next 24 hours.  · Don't drive, operate dangerous machinery, or make important business or personal decisions during the next 24 hours.  Note: Your healthcare provider may tell you not to take any medicine by mouth for pain or sleep in the next 4 hours. These medicines may react with the medicines you were given in the hospital. This could cause a much stronger response than usual.  Follow-up care  Follow up with your healthcare provider if you are not alert and back to your usual level of activity within 12 hours.  When to seek medical advice  Call your healthcare provider right away if any of these occur:  · Drowsiness gets worse  · Weakness or dizziness gets worse  · Repeated vomiting  · You can't be awakened   Date Last Reviewed: 10/18/2016  © 3178-6964 [employer]. [address] [place] [address], [place] [address]. All rights reserved. This information is not intended as a substitute for professional medical care. Always follow your healthcare professional's instructions.        Discharge Instructions for Liver Biopsy  You had a procedure called liver biopsy. A healthcare provider used a special needle to remove a small piece of tissue from your liver. Then it was examined for signs of damage or disease. A liver biopsy is ordered after other tests have shown that your liver is not working properly. You may also have a liver biopsy when liver disease is suspected, to determine whether there is too  much iron in the liver, or to rule out cancer.  Home care  Recommendations include the following:   · Because you had anesthesia, you should not drive until the day after your biopsy.   · Remove the bandage covering the biopsy site 48 hours after the procedure.  · Rest for 6 hours and take it easy when you arrive home.  · Dont shower for 24 hours after the biopsy. If you wish, you may wash yourself with a sponge or washcloth. When you are able to shower, dont scrub the site. Gently wash the area and pat it dry.  · Dont lift anything heavier than 10 pounds for up to 1 week after the procedure, or as advised by your healthcare provider.  · Don't do strenuous activities or exercises for up to 1 week after the procedure.  · Ask your healthcare provider when you can return to work.  · Do not start taking blood thinners without clear instructions from your healthcare provider.  Follow-up care  Make a follow-up appointment as directed by our staff.     When to call your healthcare provider  Call your healthcare provider immediately if you have any of the following:  · Bleeding from the biopsy site  · Dizziness or lightheadedness  · Sudden or increased shortness of breath  · Sudden chest pain  · Fever of 100.4°F (38.0°C) or higher, or as directed by your healthcare provider  · Shaking chills  · Yellow eyes or skin  · Increasing redness, tenderness, or swelling at the biopsy site  · Drainage from the biopsy site  · Opening of the biopsy site  · Vomiting blood  · Rectal bleeding or bloody stools  · Increasing pain, with or without activity, in the liver or belly area, or pain shooting to the right shoulder   Date Last Reviewed: 7/1/2016 © 2000-2017 [employer]. [address] [place] [id number]. All rights reserved. This information is not intended as a substitute for professional medical care. Always follow your healthcare professional's instructions.

## 2018-11-30 NOTE — SEDATION DOCUMENTATION
Pt in ct on table with cm in place, paced on cm.  Left cw pacemaker noted.  Pt verbalized understanding of procedure.  Pt family in hospital waiting area.

## 2018-11-30 NOTE — SEDATION DOCUMENTATION
Procedure complete, pt tolerated well.  Vss.  Band aid placed to R side puncture site, site WDL. Pt awake and able to follow simple commands.  Pt c/o moderate pain to R shoulder.  Will continue to monitor.  Pt transferred to pt stretcher and transported to radiology recovery.  Pt lying on side with R side down.

## 2018-11-30 NOTE — PLAN OF CARE
Pt discharged to home.  Stated relief from pain, nausea and itching.  Pt d/c home in stable condition via wheelchair with family.  Verbalized understanding of d/c instructions.  Pt voiced no complaints at this time. Pt stood at side of bed, walked steps with minimal assist.

## 2018-12-03 DIAGNOSIS — F41.8 SITUATIONAL ANXIETY: ICD-10-CM

## 2018-12-03 RX ORDER — ALPRAZOLAM 0.25 MG/1
TABLET ORAL
Qty: 60 TABLET | Refills: 0 | Status: SHIPPED | OUTPATIENT
Start: 2018-12-03 | End: 2019-01-03 | Stop reason: SDUPTHER

## 2018-12-13 ENCOUNTER — TELEPHONE (OUTPATIENT)
Dept: GASTROENTEROLOGY | Facility: CLINIC | Age: 78
End: 2018-12-13

## 2018-12-13 NOTE — TELEPHONE ENCOUNTER
----- Message from Alysha Browning sent at 12/13/2018  8:59 AM CST -----  Patient needs call back rg test results, patient states that she has tried to call 5 times to get results and no one has returned her call..[phone]

## 2018-12-14 NOTE — TELEPHONE ENCOUNTER
Called to discuss biopsy results.  Left a message on cell phone.  Unable to leave message on home phone as her mailbox is full.

## 2018-12-14 NOTE — TELEPHONE ENCOUNTER
----- Message from Armando Bailey MA sent at 12/12/2018  3:56 PM CST -----  Contact: Violet [phone]  Patient is requesting liver biopsy results  ----- Message -----  From: Vannessa Brice  Sent: 12/12/2018   1:33 PM  To: [facility]    Needs results from her liver biopsy.

## 2018-12-17 RX ORDER — GABAPENTIN 100 MG/1
CAPSULE ORAL
Qty: 180 CAPSULE | Refills: 2 | Status: SHIPPED | OUTPATIENT
Start: 2018-12-17 | End: 2019-01-29 | Stop reason: SDUPTHER

## 2018-12-19 ENCOUNTER — LAB VISIT (OUTPATIENT)
Dept: LAB | Facility: HOSPITAL | Age: 78
End: 2018-12-19
Attending: FAMILY MEDICINE
Payer: MEDICARE

## 2018-12-19 ENCOUNTER — OFFICE VISIT (OUTPATIENT)
Dept: INTERNAL MEDICINE | Facility: CLINIC | Age: 78
End: 2018-12-19
Payer: MEDICARE

## 2018-12-19 VITALS
TEMPERATURE: 97 F | RESPIRATION RATE: 18 BRPM | HEIGHT: 64 IN | DIASTOLIC BLOOD PRESSURE: 88 MMHG | OXYGEN SATURATION: 98 % | HEART RATE: 88 BPM | SYSTOLIC BLOOD PRESSURE: 148 MMHG | BODY MASS INDEX: 26.54 KG/M2 | WEIGHT: 155.44 LBS

## 2018-12-19 DIAGNOSIS — N18.30 STAGE 3 CHRONIC KIDNEY DISEASE: Chronic | ICD-10-CM

## 2018-12-19 DIAGNOSIS — F32.9 MAJOR DEPRESSION, CHRONIC: Chronic | ICD-10-CM

## 2018-12-19 DIAGNOSIS — Z00.00 ROUTINE GENERAL MEDICAL EXAMINATION AT A HEALTH CARE FACILITY: Primary | ICD-10-CM

## 2018-12-19 DIAGNOSIS — M79.10 MYALGIA: ICD-10-CM

## 2018-12-19 DIAGNOSIS — F41.8 SITUATIONAL ANXIETY: ICD-10-CM

## 2018-12-19 DIAGNOSIS — I10 ESSENTIAL HYPERTENSION: Chronic | ICD-10-CM

## 2018-12-19 DIAGNOSIS — E03.9 HYPOTHYROIDISM (ACQUIRED): Chronic | ICD-10-CM

## 2018-12-19 DIAGNOSIS — E78.2 MIXED HYPERLIPIDEMIA: Chronic | ICD-10-CM

## 2018-12-19 DIAGNOSIS — J30.2 SEASONAL ALLERGIC RHINITIS, UNSPECIFIED TRIGGER: ICD-10-CM

## 2018-12-19 LAB
CK SERPL-CCNC: 130 U/L
T4 FREE SERPL-MCNC: 0.75 NG/DL
TSH SERPL DL<=0.005 MIU/L-ACNC: 6.61 UIU/ML

## 2018-12-19 PROCEDURE — 84439 ASSAY OF FREE THYROXINE: CPT | Mod: HCNC

## 2018-12-19 PROCEDURE — 36415 COLL VENOUS BLD VENIPUNCTURE: CPT | Mod: HCNC

## 2018-12-19 PROCEDURE — 84443 ASSAY THYROID STIM HORMONE: CPT | Mod: HCNC

## 2018-12-19 PROCEDURE — 99999 PR PBB SHADOW E&M-EST. PATIENT-LVL III: CPT | Mod: PBBFAC,HCNC,, | Performed by: FAMILY MEDICINE

## 2018-12-19 PROCEDURE — 99397 PER PM REEVAL EST PAT 65+ YR: CPT | Mod: HCNC,S$GLB,, | Performed by: FAMILY MEDICINE

## 2018-12-19 PROCEDURE — 3077F SYST BP >= 140 MM HG: CPT | Mod: CPTII,HCNC,S$GLB, | Performed by: FAMILY MEDICINE

## 2018-12-19 PROCEDURE — 82550 ASSAY OF CK (CPK): CPT | Mod: HCNC

## 2018-12-19 PROCEDURE — 3079F DIAST BP 80-89 MM HG: CPT | Mod: CPTII,HCNC,S$GLB, | Performed by: FAMILY MEDICINE

## 2018-12-19 NOTE — ASSESSMENT & PLAN NOTE
Bilateral upper arms and legs; advised patient to hold her Crestor to see if she has some improvement; will check CK level; advised to CK is elevated she will have to discontinue Crestor going forward; advised if her symptoms persist we need to consider other etiologies; will also make Dr. Rogers, Rheumatology aware of her symptoms

## 2018-12-19 NOTE — PROGRESS NOTES
Subjective:       Patient ID: Violet Swenson is a 78 y.o. female.    Chief Complaint: Annual Exam    Patient presents to clinic today for annual physical exam.  Patient complains of muscle aches that are bothering her mostly at night although occur throughout the day.  She reports this pain is in her bilateral thighs.  She states that seems to be better with movement.  She reports this has been going on for several months.  On further questioning she states that she does have similar symptoms in her upper arms, but the symptoms in her legs were so severe that she does not really notice that.  She states this is different from the pain she has with her neuropathy.  She reports it has interfered with sleep several nights.      Review of Systems   Constitutional: Positive for fatigue. Negative for chills, fever and unexpected weight change.   HENT: Negative for congestion, dental problem, ear pain, hearing loss, rhinorrhea and trouble swallowing.    Eyes: Negative for pain and visual disturbance.   Respiratory: Negative for cough and shortness of breath.    Cardiovascular: Negative for chest pain, palpitations and leg swelling.   Gastrointestinal: Negative for abdominal distention, abdominal pain, blood in stool, constipation, diarrhea, nausea and vomiting.   Genitourinary: Negative for difficulty urinating and vaginal discharge.   Musculoskeletal: Positive for myalgias. Negative for arthralgias.   Skin: Negative for rash.   Neurological: Negative for dizziness, weakness, numbness and headaches.   Hematological: Negative for adenopathy. Does not bruise/bleed easily.   Psychiatric/Behavioral: Positive for dysphoric mood. Negative for sleep disturbance. The patient is nervous/anxious.        Objective:      Physical Exam   Constitutional: She is oriented to person, place, and time. Vital signs are normal. She appears well-developed and well-nourished. No distress.   HENT:   Head: Normocephalic and atraumatic.    Right Ear: Tympanic membrane, external ear and ear canal normal.   Left Ear: Tympanic membrane, external ear and ear canal normal.   Nose: Nose normal. No mucosal edema or rhinorrhea.   Mouth/Throat: Uvula is midline, oropharynx is clear and moist and mucous membranes are normal.   Eyes: Conjunctivae, EOM and lids are normal. Pupils are equal, round, and reactive to light.   Neck: Normal range of motion. Neck supple. No thyromegaly present.   Cardiovascular: Normal rate and regular rhythm. Exam reveals no gallop and no friction rub.   No murmur heard.  Pulmonary/Chest: Effort normal and breath sounds normal. She has no wheezes. She has no rhonchi. She has no rales.   Abdominal: Soft. Normal appearance and bowel sounds are normal. She exhibits no distension and no mass. There is no hepatosplenomegaly. There is no tenderness.   Musculoskeletal: Normal range of motion.   Lymphadenopathy:     She has no cervical adenopathy.   Neurological: She is alert and oriented to person, place, and time. She has normal strength. No cranial nerve deficit or sensory deficit. Gait normal.   Reflex Scores:       Patellar reflexes are 2+ on the right side and 2+ on the left side.  Skin: Skin is warm and dry. No lesion and no rash noted. No cyanosis. Nails show no clubbing.   Psychiatric: Her mood appears not anxious. She exhibits a depressed mood (mild).   Vitals reviewed.      Assessment:       1. Routine general medical examination at a health care facility    2. Hypothyroidism (acquired)    3. Myalgia    4. Essential hypertension    5. Mixed hyperlipidemia    6. Stage 3 chronic kidney disease    7. Seasonal allergic rhinitis, unspecified trigger    8. Major depression, chronic    9. Situational anxiety        Plan:     Problem List Items Addressed This Visit     Essential hypertension (Chronic)    Current Assessment & Plan     Little elevated today, will monitor and continue current medications         Mixed hyperlipidemia  (Chronic)    Current Assessment & Plan     Followed by Cardiology; currently taking Crestor; advised to hold as I am suspicious that her myalgias may be statin induced; will make Dr. Gil aware         Hypothyroidism (acquired) (Chronic)    Current Assessment & Plan     Status pending labs, continue levothyroxine         Relevant Orders    T4, free    TSH    Major depression, chronic (Chronic)    Current Assessment & Plan     Stable on Zoloft         Stage 3 chronic kidney disease (Chronic)    Current Assessment & Plan     Followed by Dr. Gomez, nephrology         Situational anxiety    Current Assessment & Plan     Stable on Xanax as needed         Seasonal allergic rhinitis    Current Assessment & Plan     Stable on Claritin and Flonase         Myalgia    Current Assessment & Plan     Bilateral upper arms and legs; advised patient to hold her Crestor to see if she has some improvement; will check CK level; advised to CK is elevated she will have to discontinue Crestor going forward; advised if her symptoms persist we need to consider other etiologies; will also make Dr. Rogers, Rheumatology aware of her symptoms         Relevant Orders    CK      Other Visit Diagnoses     Routine general medical examination at a health care facility    -  Primary        Followed by Dr. Gil, cardiology; Dr. Prescott, Heme-Onc; Dr. Gomez, nephrology; Dr. Rogers, rheumatology; Dr. Morales, GI  Health Maintenance reviewed/updated. Discussed shingles vaccine, she will consider in the future.

## 2018-12-19 NOTE — Clinical Note
Patient seen today for physical; she reports severe myalgias in bilateral upper legs/arms for several months; she reports recently interfering with sleep; I advised to hold crestor and am checking CK level; I wanted you all to be aware; advised if not improved with holding statin will need further evaluation from Rheumatology standpoint.Thanks!Marti

## 2018-12-19 NOTE — ASSESSMENT & PLAN NOTE
Followed by Cardiology; currently taking Crestor; advised to hold as I am suspicious that her myalgias may be statin induced; will make Dr. Gil aware

## 2019-01-03 DIAGNOSIS — F51.05 INSOMNIA SECONDARY TO ANXIETY: ICD-10-CM

## 2019-01-03 DIAGNOSIS — F41.9 INSOMNIA SECONDARY TO ANXIETY: ICD-10-CM

## 2019-01-03 DIAGNOSIS — F32.9 MAJOR DEPRESSION, CHRONIC: ICD-10-CM

## 2019-01-03 DIAGNOSIS — F41.8 SITUATIONAL ANXIETY: ICD-10-CM

## 2019-01-03 RX ORDER — SERTRALINE HYDROCHLORIDE 100 MG/1
TABLET, FILM COATED ORAL
Qty: 90 TABLET | Refills: 1 | Status: SHIPPED | OUTPATIENT
Start: 2019-01-03 | End: 2019-04-30 | Stop reason: SDUPTHER

## 2019-01-03 RX ORDER — ALPRAZOLAM 0.25 MG/1
TABLET ORAL
Qty: 60 TABLET | Refills: 0 | Status: SHIPPED | OUTPATIENT
Start: 2019-01-03 | End: 2019-02-01 | Stop reason: SDUPTHER

## 2019-01-03 RX ORDER — METOPROLOL TARTRATE 25 MG/1
TABLET, FILM COATED ORAL
Qty: 60 TABLET | Refills: 2 | Status: SHIPPED | OUTPATIENT
Start: 2019-01-03 | End: 2019-03-27 | Stop reason: SDUPTHER

## 2019-01-11 DIAGNOSIS — I25.10 CORONARY ARTERY DISEASE WITHOUT ANGINA PECTORIS, UNSPECIFIED VESSEL OR LESION TYPE, UNSPECIFIED WHETHER NATIVE OR TRANSPLANTED HEART: ICD-10-CM

## 2019-01-11 RX ORDER — CLOPIDOGREL BISULFATE 75 MG/1
TABLET ORAL
Qty: 90 TABLET | Refills: 3 | Status: SHIPPED | OUTPATIENT
Start: 2019-01-11 | End: 2019-12-27

## 2019-01-16 ENCOUNTER — TELEPHONE (OUTPATIENT)
Dept: RHEUMATOLOGY | Facility: CLINIC | Age: 79
End: 2019-01-16

## 2019-01-16 DIAGNOSIS — M81.0 AGE-RELATED OSTEOPOROSIS WITHOUT CURRENT PATHOLOGICAL FRACTURE: Primary | ICD-10-CM

## 2019-01-22 ENCOUNTER — TELEPHONE (OUTPATIENT)
Dept: INTERNAL MEDICINE | Facility: CLINIC | Age: 79
End: 2019-01-22

## 2019-01-22 NOTE — TELEPHONE ENCOUNTER
----- Message from Kerrie Kline sent at 1/22/2019 10:33 AM CST -----  Pt is returning a call to nurse in regards to labs results.        Please call pt back at [phone]

## 2019-01-23 DIAGNOSIS — I25.10 CORONARY ARTERY DISEASE, ANGINA PRESENCE UNSPECIFIED, UNSPECIFIED VESSEL OR LESION TYPE, UNSPECIFIED WHETHER NATIVE OR TRANSPLANTED HEART: Primary | ICD-10-CM

## 2019-01-24 ENCOUNTER — OFFICE VISIT (OUTPATIENT)
Dept: CARDIOLOGY | Facility: CLINIC | Age: 79
End: 2019-01-24
Payer: MEDICARE

## 2019-01-24 ENCOUNTER — CLINICAL SUPPORT (OUTPATIENT)
Dept: CARDIOLOGY | Facility: CLINIC | Age: 79
End: 2019-01-24
Payer: MEDICARE

## 2019-01-24 VITALS
SYSTOLIC BLOOD PRESSURE: 148 MMHG | BODY MASS INDEX: 25.25 KG/M2 | DIASTOLIC BLOOD PRESSURE: 84 MMHG | HEART RATE: 78 BPM | WEIGHT: 147.94 LBS | HEIGHT: 64 IN

## 2019-01-24 DIAGNOSIS — C85.83 LARGE CELL LYMPHOMA OF INTRA-ABDOMINAL LYMPH NODES: Chronic | ICD-10-CM

## 2019-01-24 DIAGNOSIS — I25.5 ISCHEMIC CARDIOMYOPATHY: Chronic | ICD-10-CM

## 2019-01-24 DIAGNOSIS — E78.2 MIXED HYPERLIPIDEMIA: Chronic | ICD-10-CM

## 2019-01-24 DIAGNOSIS — D64.9 CHRONIC ANEMIA: Chronic | ICD-10-CM

## 2019-01-24 DIAGNOSIS — I25.10 CORONARY ARTERY DISEASE, ANGINA PRESENCE UNSPECIFIED, UNSPECIFIED VESSEL OR LESION TYPE, UNSPECIFIED WHETHER NATIVE OR TRANSPLANTED HEART: ICD-10-CM

## 2019-01-24 DIAGNOSIS — I25.10 CORONARY ARTERY DISEASE INVOLVING NATIVE CORONARY ARTERY OF NATIVE HEART WITHOUT ANGINA PECTORIS: Primary | Chronic | ICD-10-CM

## 2019-01-24 DIAGNOSIS — I70.0 CALCIFICATION OF AORTA: Chronic | ICD-10-CM

## 2019-01-24 DIAGNOSIS — Z86.79 HISTORY OF CONGESTIVE HEART FAILURE: ICD-10-CM

## 2019-01-24 DIAGNOSIS — I10 ESSENTIAL HYPERTENSION: Chronic | ICD-10-CM

## 2019-01-24 DIAGNOSIS — T45.1X5A CHEMOTHERAPY-INDUCED NEUROPATHY: Chronic | ICD-10-CM

## 2019-01-24 DIAGNOSIS — I48.0 PAROXYSMAL ATRIAL FIBRILLATION: Chronic | ICD-10-CM

## 2019-01-24 DIAGNOSIS — G62.0 CHEMOTHERAPY-INDUCED NEUROPATHY: Chronic | ICD-10-CM

## 2019-01-24 DIAGNOSIS — Z95.0 PACEMAKER: Chronic | ICD-10-CM

## 2019-01-24 DIAGNOSIS — N18.30 STAGE 3 CHRONIC KIDNEY DISEASE: Chronic | ICD-10-CM

## 2019-01-24 DIAGNOSIS — Z98.84 S/P GASTRIC BYPASS: Chronic | ICD-10-CM

## 2019-01-24 PROCEDURE — 99214 PR OFFICE/OUTPT VISIT, EST, LEVL IV, 30-39 MIN: ICD-10-PCS | Mod: HCNC,S$GLB,, | Performed by: INTERNAL MEDICINE

## 2019-01-24 PROCEDURE — 99499 UNLISTED E&M SERVICE: CPT | Mod: HCNC,S$GLB,, | Performed by: INTERNAL MEDICINE

## 2019-01-24 PROCEDURE — 99214 OFFICE O/P EST MOD 30 MIN: CPT | Mod: HCNC,S$GLB,, | Performed by: INTERNAL MEDICINE

## 2019-01-24 PROCEDURE — 99999 PR PBB SHADOW E&M-EST. PATIENT-LVL III: ICD-10-PCS | Mod: PBBFAC,HCNC,, | Performed by: INTERNAL MEDICINE

## 2019-01-24 PROCEDURE — 3079F PR MOST RECENT DIASTOLIC BLOOD PRESSURE 80-89 MM HG: ICD-10-PCS | Mod: HCNC,CPTII,S$GLB, | Performed by: INTERNAL MEDICINE

## 2019-01-24 PROCEDURE — 99499 RISK ADDL DX/OHS AUDIT: ICD-10-PCS | Mod: HCNC,S$GLB,, | Performed by: INTERNAL MEDICINE

## 2019-01-24 PROCEDURE — 3079F DIAST BP 80-89 MM HG: CPT | Mod: HCNC,CPTII,S$GLB, | Performed by: INTERNAL MEDICINE

## 2019-01-24 PROCEDURE — 3077F PR MOST RECENT SYSTOLIC BLOOD PRESSURE >= 140 MM HG: ICD-10-PCS | Mod: HCNC,CPTII,S$GLB, | Performed by: INTERNAL MEDICINE

## 2019-01-24 PROCEDURE — 99999 PR PBB SHADOW E&M-EST. PATIENT-LVL III: CPT | Mod: PBBFAC,HCNC,, | Performed by: INTERNAL MEDICINE

## 2019-01-24 PROCEDURE — 3077F SYST BP >= 140 MM HG: CPT | Mod: HCNC,CPTII,S$GLB, | Performed by: INTERNAL MEDICINE

## 2019-01-24 PROCEDURE — 1101F PR PT FALLS ASSESS DOC 0-1 FALLS W/OUT INJ PAST YR: ICD-10-PCS | Mod: HCNC,CPTII,S$GLB, | Performed by: INTERNAL MEDICINE

## 2019-01-24 PROCEDURE — 93000 EKG 12-LEAD: ICD-10-PCS | Mod: HCNC,S$GLB,, | Performed by: INTERNAL MEDICINE

## 2019-01-24 PROCEDURE — 1101F PT FALLS ASSESS-DOCD LE1/YR: CPT | Mod: HCNC,CPTII,S$GLB, | Performed by: INTERNAL MEDICINE

## 2019-01-24 PROCEDURE — 93000 ELECTROCARDIOGRAM COMPLETE: CPT | Mod: HCNC,S$GLB,, | Performed by: INTERNAL MEDICINE

## 2019-01-24 RX ORDER — PRAVASTATIN SODIUM 40 MG/1
40 TABLET ORAL DAILY
Qty: 90 TABLET | Refills: 3 | Status: SHIPPED | OUTPATIENT
Start: 2019-01-24 | End: 2019-12-27

## 2019-01-24 NOTE — PROGRESS NOTES
Subjective:   Patient ID:  Violet Swenson is a 78 y.o. female who presents for follow up of Hypertension      HPI  A 77 yo feamle with pacer cad s/p stent placed h/o chf cardiomyopathy large cell lymphoma  gerd htn hlp is here for f/u she is feeling well no chest pain or shortness of braeth or chdf she feels she has low energy gets easuily fatigued. She has stopped statins at the direction of DR LEARY DUE TO MUSCLE ACHES ABDOMINAL THAT ALL RESOLVED AFTER STOPPING MEDS. SHE WILL BE A CANDIDATE FOR REPATHA.  Past Medical History:   Diagnosis Date    Age-related osteoporosis without current pathological fracture 8/20/2018    Anemia     Anxiety     Arthritis     Cancer     lymphoma Large cell B    Chronic anemia 4/26/2017    Chronic midline low back pain with right-sided sciatica 8/20/2018    Coronary artery disease     01/2015 LHC patent LCX. 50% stenosis in LAD and RCA.      Depression     Disorder of kidney and ureter     Encounter for blood transfusion     GERD (gastroesophageal reflux disease)     Gout, arthritis     Heart failure     Hx of psychiatric care     Hyperlipidemia     Hypertension     Hypothyroidism     Immune deficiency disorder     Kidney disease     Lung nodule 2014    RML--stable    Obesity     Pacemaker     Metronic    Paroxysmal atrial fibrillation 3/15/2018    Pneumonia     Polyneuropathy     chemo induced    Psychiatric problem     Tobacco dependence     quit 1976    Trouble in sleeping        Past Surgical History:   Procedure Laterality Date    APPENDECTOMY  1966 approx    BIOPSY-BONE MARROW N/A 5/15/2017    Performed [facility]in [facility] OR    CARDIAC PACEMAKER PLACEMENT  01/22/2015    CHOLECYSTECTOMY  1993    incidental at time of gastric bypass    COLECTOMY-PARTIAL N/A 4/7/2017    Performed by Orlando Moulton [facility] at [facility] OR    COLON SURGERY Right 2017    hemicolectomy    COLONOSCOPY N/A 11/28/2018    Performed by Saúl Arthur  "III, MD at [facility] ENDO    COLONOSCOPY N/A 2017    Performed by Tye Enamorado MD at [facility] ENDO    CORONARY ANGIOPLASTY  2014    CORONARY STENT PLACEMENT  2014    EGD (ESOPHAGOGASTRODUODENOSCOPY) N/A 2018    Performed by Saúl Arthur III, MD at [facility] ENDO    EXPLORATORY-LAPAROTOMY N/A 2017    Performed by Orlando [facility]ton [facility] at [facility] OR    GASTRIC BYPASS      with incidental choly    HEART CATH-BILATERAL N/A 2017    Performed by Nader Gil MD at [facility] CATH LAB    HERNIA REPAIR      SEHRHCELG-NMXV-R-CATH N/A 5/15/2017    Performed by Orlando [facility]oulton MD at [facility] OR    JOINT REPLACEMENT Bilateral 2009    3 months apart    LYSIS-ADHESION N/A 2017    Performed by Orlando Moulton MD at [facility] OR    REMOVAL-PORT-A-CATH N/A 2018    Performed by Nima Abdullahi MD at [facility] CATH LAB    TONSILLECTOMY         Social History     Tobacco Use    Smoking status: Former Smoker     Packs/day: 2.00     Years: 6.00     Pack years: 12.00     Last attempt to quit: 3/15/1976     Years since quittin.8    Smokeless tobacco: Never Used   Substance Use Topics    Alcohol use: No     Alcohol/week: 0.0 oz    Drug use: No       Family History   Problem Relation Age of Onset    Heart disease Mother     Hypertension Mother     Cataracts Mother     Stomach cancer Father         "ulcers that turned to cancer"    Cancer Father         stomach    Pancreatic cancer Sister     Cancer Sister         pancreatic    Leukemia Brother         "leukemia which led to intestinal cancer"    Cataracts Brother     Cancer Brother         leukemia then later stomach cancer    Drug abuse Son     Cancer Son         prostate cancer    Prostate cancer Son     COPD Daughter     Asthma Daughter     Hypertension Maternal Grandfather     Stroke Maternal Grandfather     Alcohol abuse Neg Hx     Diabetes Neg Hx     Mental retardation Neg Hx     Mental illness Neg Hx        Current Outpatient " Medications   Medication Sig    allopurinol (ZYLOPRIM) 300 MG tablet Take 1 tablet (300 mg total) by mouth once daily.    ALPRAZolam (XANAX) 0.25 MG tablet TAKE ONE TABLET BY MOUTH TWICE DAILY AS NEEDED FOR ANXIETY    ascorbic acid, vitamin C, (VITAMIN C) 100 MG tablet Take by mouth once daily.    aspirin (ECOTRIN) 81 MG EC tablet Take 81 mg by mouth nightly.     calcitRIOL (ROCALTROL) 0.25 MCG Cap Take 1 capsule (0.25 mcg total) by mouth every Mon, Wed, Fri.    clopidogrel (PLAVIX) 75 mg tablet TAKE ONE TABLET BY MOUTH EVERY DAY    COLCRYS 0.6 mg tablet Take 1 tablet (0.6 mg total) by mouth once daily.    ergocalciferol (ERGOCALCIFEROL) 50,000 unit Cap Take 1 capsule (50,000 Units total) by mouth every 7 days.    furosemide (LASIX) 40 MG tablet Take 1 tab daily and an extra tab as needed for edema    gabapentin (NEURONTIN) 100 MG capsule TAKE TWO CAPSULES BY MOUTH THREE TIMES DAILY    iron-vitamin C 100-250 mg, ICAR-C, 100-250 mg Tab TAKE ONE TABLET BY MOUTH EVERY DAY    isosorbide mononitrate (IMDUR) 30 MG 24 hr tablet TAKE ONE TABLET BY MOUTH AT BEDTIME    levothyroxine (SYNTHROID) 75 MCG tablet TAKE 1 TABLET(75 MCG) BY MOUTH BEFORE BREAKFAST    loratadine (CLARITIN) 10 mg tablet Take 1 tablet (10 mg total) by mouth once daily.    losartan (COZAAR) 25 MG tablet Take 1 tablet (25 mg total) by mouth once daily.    meloxicam (MOBIC) 7.5 MG tablet TAKE ONE TABLET BY MOUTH EVERY DAY AS NEEDED FOR PAIN    metoprolol tartrate (LOPRESSOR) 25 MG tablet TAKE ONE TABLET BY MOUTH TWICE DAILY    mirtazapine (REMERON SOL-TAB) 15 MG disintegrating tablet DISSOLVE ONE TABLET BY MOUTH NIGHTLY    multivitamin (ONE DAILY MULTIVITAMIN) per tablet Take 1 tablet by mouth once daily.    ranitidine (ZANTAC) 150 MG capsule Take 150 mg by mouth nightly.     sertraline (ZOLOFT) 100 MG tablet TAKE 1.5 TABLETS BY MOUTH ONCE DAILY    sodium bicarbonate 650 MG tablet Take 1 tablet (650 mg total) by mouth 2 (two) times  daily.    ZINC ACETATE ORAL Take 250 mg by mouth once daily.     diclofenac sodium 1 % Gel Apply 2 g topically once daily. Apply 2 g over painful joints once or twice a day.    fluticasone (FLONASE) 50 mcg/actuation nasal spray 2 sprays by Each Nare route once daily. (Patient taking differently: 2 sprays by Each Nare route daily as needed. )     Current Facility-Administered Medications   Medication    denosumab (PROLIA) injection 60 mg     Current Outpatient Medications on File Prior to Visit   Medication Sig    allopurinol (ZYLOPRIM) 300 MG tablet Take 1 tablet (300 mg total) by mouth once daily.    ALPRAZolam (XANAX) 0.25 MG tablet TAKE ONE TABLET BY MOUTH TWICE DAILY AS NEEDED FOR ANXIETY    ascorbic acid, vitamin C, (VITAMIN C) 100 MG tablet Take by mouth once daily.    aspirin (ECOTRIN) 81 MG EC tablet Take 81 mg by mouth nightly.     calcitRIOL (ROCALTROL) 0.25 MCG Cap Take 1 capsule (0.25 mcg total) by mouth every Mon, Wed, Fri.    clopidogrel (PLAVIX) 75 mg tablet TAKE ONE TABLET BY MOUTH EVERY DAY    COLCRYS 0.6 mg tablet Take 1 tablet (0.6 mg total) by mouth once daily.    ergocalciferol (ERGOCALCIFEROL) 50,000 unit Cap Take 1 capsule (50,000 Units total) by mouth every 7 days.    furosemide (LASIX) 40 MG tablet Take 1 tab daily and an extra tab as needed for edema    gabapentin (NEURONTIN) 100 MG capsule TAKE TWO CAPSULES BY MOUTH THREE TIMES DAILY    iron-vitamin C 100-250 mg, ICAR-C, 100-250 mg Tab TAKE ONE TABLET BY MOUTH EVERY DAY    isosorbide mononitrate (IMDUR) 30 MG 24 hr tablet TAKE ONE TABLET BY MOUTH AT BEDTIME    levothyroxine (SYNTHROID) 75 MCG tablet TAKE 1 TABLET(75 MCG) BY MOUTH BEFORE BREAKFAST    loratadine (CLARITIN) 10 mg tablet Take 1 tablet (10 mg total) by mouth once daily.    losartan (COZAAR) 25 MG tablet Take 1 tablet (25 mg total) by mouth once daily.    meloxicam (MOBIC) 7.5 MG tablet TAKE ONE TABLET BY MOUTH EVERY DAY AS NEEDED FOR PAIN    metoprolol  tartrate (LOPRESSOR) 25 MG tablet TAKE ONE TABLET BY MOUTH TWICE DAILY    mirtazapine (REMERON SOL-TAB) 15 MG disintegrating tablet DISSOLVE ONE TABLET BY MOUTH NIGHTLY    multivitamin (ONE DAILY MULTIVITAMIN) per tablet Take 1 tablet by mouth once daily.    ranitidine (ZANTAC) 150 MG capsule Take 150 mg by mouth nightly.     sertraline (ZOLOFT) 100 MG tablet TAKE 1.5 TABLETS BY MOUTH ONCE DAILY    sodium bicarbonate 650 MG tablet Take 1 tablet (650 mg total) by mouth 2 (two) times daily.    ZINC ACETATE ORAL Take 250 mg by mouth once daily.     diclofenac sodium 1 % Gel Apply 2 g topically once daily. Apply 2 g over painful joints once or twice a day.    fluticasone (FLONASE) 50 mcg/actuation nasal spray 2 sprays by Each Nare route once daily. (Patient taking differently: 2 sprays by Each Nare route daily as needed. )    [DISCONTINUED] gabapentin (NEURONTIN) 300 MG capsule Take 1 capsule (300 mg total) by mouth 3 (three) times daily.    [DISCONTINUED] rosuvastatin (CRESTOR) 10 MG tablet TAKE ONE TABLET BY MOUTH EVERY EVENING     Current Facility-Administered Medications on File Prior to Visit   Medication    denosumab (PROLIA) injection 60 mg     Review of patient's allergies indicates:   Allergen Reactions    Corticosteroids (glucocorticoids) Nausea Only and Other (See Comments)     Stomach pain, dizziness, headache    Oxycodone Other (See Comments)     Blood pressure dropped       Review of Systems   Constitution: Positive for weakness and malaise/fatigue. Negative for diaphoresis and weight gain.   HENT: Negative for hoarse voice.    Eyes: Negative for double vision and visual disturbance.   Cardiovascular: Negative for chest pain, claudication, cyanosis, dyspnea on exertion, irregular heartbeat, leg swelling, near-syncope, orthopnea, palpitations, paroxysmal nocturnal dyspnea and syncope.   Respiratory: Negative for cough, hemoptysis, shortness of breath and snoring.    Hematologic/Lymphatic:  Negative for bleeding problem. Bruises/bleeds easily.   Skin: Negative for color change and poor wound healing.   Musculoskeletal: Negative for muscle cramps, muscle weakness and myalgias.   Gastrointestinal: Negative for bloating, abdominal pain, change in bowel habit, diarrhea, heartburn, hematemesis, hematochezia, melena and nausea.   Neurological: Negative for excessive daytime sleepiness, dizziness, headaches, light-headedness, loss of balance and numbness.   Psychiatric/Behavioral: Negative for memory loss. The patient does not have insomnia.    Allergic/Immunologic: Negative for hives.       Objective:   Physical Exam   Constitutional: She is oriented to person, place, and time. She appears well-developed and well-nourished. She does not appear ill. No distress.   HENT:   Head: Normocephalic and atraumatic.   Eyes: EOM are normal. Pupils are equal, round, and reactive to light. No scleral icterus.   Neck: Normal range of motion. Neck supple. Normal carotid pulses, no hepatojugular reflux and no JVD present. Carotid bruit is not present. No tracheal deviation present. No thyromegaly present.   Cardiovascular: Normal rate, regular rhythm, normal heart sounds, intact distal pulses and normal pulses. Exam reveals no gallop and no friction rub.   No murmur heard.  Pulses:       Carotid pulses are 2+ on the right side, and 2+ on the left side.       Radial pulses are 2+ on the right side, and 2+ on the left side.        Femoral pulses are 2+ on the right side, and 2+ on the left side.       Popliteal pulses are 2+ on the right side, and 2+ on the left side.        Dorsalis pedis pulses are 2+ on the right side, and 2+ on the left side.        Posterior tibial pulses are 2+ on the right side, and 2+ on the left side.   Pulmonary/Chest: Effort normal and breath sounds normal. No respiratory distress. She has no wheezes. She has no rhonchi. She has no rales. She exhibits no tenderness.   PACER SITE WELL HEALED.  "  Abdominal: Soft. Normal appearance, normal aorta and bowel sounds are normal. She exhibits no abdominal bruit, no ascites and no pulsatile midline mass. There is no hepatomegaly. There is no tenderness.   Musculoskeletal: She exhibits no edema.        Right shoulder: She exhibits no deformity.   Neurological: She is alert and oriented to person, place, and time. She has normal strength. No cranial nerve deficit. Coordination normal.   Skin: Skin is warm and dry. No rash noted. She is not diaphoretic. No cyanosis or erythema. Nails show no clubbing.   Psychiatric: She has a normal mood and affect. Her speech is normal and behavior is normal.   Nursing note and vitals reviewed.    Vitals:    01/24/19 1434   BP: (!) 148/84   Patient Position: Sitting   BP Method: Small (Manual)   Pulse: 78   Weight: 67.1 kg (147 lb 14.9 oz)   Height: 5' 4" (1.626 m)     Lab Results   Component Value Date    CHOL 92 (L) 07/19/2018    CHOL 138 04/05/2017    CHOL 114 (L) 11/07/2016     Lab Results   Component Value Date    HDL 55 07/19/2018    HDL 58 04/05/2017    HDL 51 11/07/2016     Lab Results   Component Value Date    LDLCALC 26.4 (L) 07/19/2018    LDLCALC 62.8 (L) 04/05/2017    LDLCALC 41.8 (L) 11/07/2016     Lab Results   Component Value Date    TRIG 53 07/19/2018    TRIG 86 04/05/2017    TRIG 106 11/07/2016     Lab Results   Component Value Date    CHOLHDL 59.8 (H) 07/19/2018    CHOLHDL 42.0 04/05/2017    CHOLHDL 44.7 11/07/2016       Chemistry        Component Value Date/Time     11/14/2018 1041     11/14/2018 1041    K 4.0 11/28/2018 1307     (H) 11/14/2018 1041     (H) 11/14/2018 1041    CO2 21 (L) 11/14/2018 1041    CO2 21 (L) 11/14/2018 1041    BUN 22 11/14/2018 1041    BUN 22 11/14/2018 1041    CREATININE 1.1 11/14/2018 1041    CREATININE 1.1 11/14/2018 1041    GLU 86 11/14/2018 1041    GLU 86 11/14/2018 1041        Component Value Date/Time    CALCIUM 9.4 11/14/2018 1041    CALCIUM 9.4 11/14/2018 " 1041    ALKPHOS 88 11/14/2018 1041    ALKPHOS 88 11/14/2018 1041     (H) 11/14/2018 1041     (H) 11/14/2018 1041    ALT 79 (H) 11/14/2018 1041    ALT 79 (H) 11/14/2018 1041    BILITOT 0.3 11/14/2018 1041    BILITOT 0.3 11/14/2018 1041    ESTGFRAFRICA 56 (A) 11/14/2018 1041    ESTGFRAFRICA 56 (A) 11/14/2018 1041    EGFRNONAA 49 (A) 11/14/2018 1041    EGFRNONAA 49 (A) 11/14/2018 1041          Lab Results   Component Value Date    TSH 6.606 (H) 12/19/2018     Lab Results   Component Value Date    INR 1.0 11/30/2018    INR 1.0 09/01/2017    INR 1.0 04/05/2017     Lab Results   Component Value Date    WBC 7.18 11/30/2018    HGB 10.0 (L) 11/30/2018    HCT 30.2 (L) 11/30/2018     (H) 11/30/2018     11/30/2018     BMP  Sodium   Date Value Ref Range Status   11/14/2018 138 136 - 145 mmol/L Final   11/14/2018 138 136 - 145 mmol/L Final     Potassium   Date Value Ref Range Status   11/28/2018 4.0 3.5 - 5.1 mmol/L Final     Chloride   Date Value Ref Range Status   11/14/2018 113 (H) 95 - 110 mmol/L Final   11/14/2018 113 (H) 95 - 110 mmol/L Final     CO2   Date Value Ref Range Status   11/14/2018 21 (L) 23 - 29 mmol/L Final   11/14/2018 21 (L) 23 - 29 mmol/L Final     BUN, Bld   Date Value Ref Range Status   11/14/2018 22 8 - 23 mg/dL Final   11/14/2018 22 8 - 23 mg/dL Final     Creatinine   Date Value Ref Range Status   11/14/2018 1.1 0.5 - 1.4 mg/dL Final   11/14/2018 1.1 0.5 - 1.4 mg/dL Final     Calcium   Date Value Ref Range Status   11/14/2018 9.4 8.7 - 10.5 mg/dL Final   11/14/2018 9.4 8.7 - 10.5 mg/dL Final     Anion Gap   Date Value Ref Range Status   11/14/2018 4 (L) 8 - 16 mmol/L Final   11/14/2018 4 (L) 8 - 16 mmol/L Final     eGFR if    Date Value Ref Range Status   11/14/2018 56 (A) >60 mL/min/1.73 m^2 Final   11/14/2018 56 (A) >60 mL/min/1.73 m^2 Final     eGFR if non    Date Value Ref Range Status   11/14/2018 49 (A) >60 mL/min/1.73 m^2 Final      Comment:     Calculation used to obtain the estimated glomerular filtration  rate (eGFR) is the CKD-EPI equation.      11/14/2018 49 (A) >60 mL/min/1.73 m^2 Final     Comment:     Calculation used to obtain the estimated glomerular filtration  rate (eGFR) is the CKD-EPI equation.        CrCl cannot be calculated (Patient's most recent lab result is older than the maximum 7 days allowed.).    Assessment:     1. Coronary artery disease : multiple vessels, s/p PTCA    2. Essential hypertension    3. Mixed hyperlipidemia    4. Pacemaker    5. Ischemic cardiomyopathy    6. Chronic anemia    7. S/P gastric bypass    8. Calcification of aorta    9. 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 Stage 3 chronic kidney disease    11. Chemotherapy-induced neuropathy    12. Paroxysmal atrial fibrillation    13. History of congestive heart failure      HTN NEEDS BETTER CONTROL COUNSELED ABOUT FOOD INTAKE .  LIPIDS ON [employer] HIOWEVER OFF STATINS .SHE HAD SIDE EFFECTS FROM LIPITOR AND CRESTOR. MAY GIVE A TRAIL TO PRAVASATATIN 40 MG PO DAILY IF HAS SYMPTOMS WILL PLAN ON PCSK9/    Plan:   PRAVASTATIN 40 MG PO DAILY  Continue current therapy  Cardiac low salt diet.  Risk factor modification and excercise program.  F/u in 6 months with lipid cmp .

## 2019-01-29 DIAGNOSIS — G62.9 NEUROPATHY: ICD-10-CM

## 2019-01-29 DIAGNOSIS — F51.01 PRIMARY INSOMNIA: ICD-10-CM

## 2019-01-29 DIAGNOSIS — C85.83 LARGE CELL LYMPHOMA OF INTRA-ABDOMINAL LYMPH NODES: ICD-10-CM

## 2019-01-29 RX ORDER — GABAPENTIN 100 MG/1
CAPSULE ORAL
Qty: 180 CAPSULE | Refills: 2 | Status: SHIPPED | OUTPATIENT
Start: 2019-01-29 | End: 2019-04-30 | Stop reason: SDUPTHER

## 2019-01-29 RX ORDER — CALCITRIOL 0.25 UG/1
0.25 CAPSULE ORAL
Qty: 12 CAPSULE | Refills: 5 | Status: SHIPPED | OUTPATIENT
Start: 2019-01-30 | End: 2019-07-29 | Stop reason: SDUPTHER

## 2019-01-30 RX ORDER — MIRTAZAPINE 15 MG/1
TABLET, ORALLY DISINTEGRATING ORAL
Qty: 30 TABLET | Refills: 2 | Status: SHIPPED | OUTPATIENT
Start: 2019-01-30 | End: 2019-04-30 | Stop reason: SDUPTHER

## 2019-02-01 DIAGNOSIS — F41.8 SITUATIONAL ANXIETY: ICD-10-CM

## 2019-02-04 ENCOUNTER — TELEPHONE (OUTPATIENT)
Dept: RADIOLOGY | Facility: HOSPITAL | Age: 79
End: 2019-02-04

## 2019-02-04 RX ORDER — ALPRAZOLAM 0.25 MG/1
TABLET ORAL
Qty: 60 TABLET | Refills: 0 | Status: SHIPPED | OUTPATIENT
Start: 2019-02-04 | End: 2019-03-27 | Stop reason: SDUPTHER

## 2019-02-06 ENCOUNTER — OFFICE VISIT (OUTPATIENT)
Dept: HEMATOLOGY/ONCOLOGY | Facility: CLINIC | Age: 79
End: 2019-02-06
Payer: MEDICARE

## 2019-02-06 ENCOUNTER — HOSPITAL ENCOUNTER (OUTPATIENT)
Dept: RADIOLOGY | Facility: HOSPITAL | Age: 79
Discharge: HOME | End: 2019-02-06
Attending: INTERNAL MEDICINE
Payer: MEDICARE

## 2019-02-06 ENCOUNTER — TELEPHONE (OUTPATIENT)
Dept: CARDIOLOGY | Facility: CLINIC | Age: 79
End: 2019-02-06

## 2019-02-06 VITALS
DIASTOLIC BLOOD PRESSURE: 61 MMHG | TEMPERATURE: 98 F | SYSTOLIC BLOOD PRESSURE: 93 MMHG | HEART RATE: 85 BPM | RESPIRATION RATE: 16 BRPM | OXYGEN SATURATION: 96 %

## 2019-02-06 DIAGNOSIS — C85.83 LARGE CELL LYMPHOMA OF INTRA-ABDOMINAL LYMPH NODES: Primary | Chronic | ICD-10-CM

## 2019-02-06 DIAGNOSIS — C85.83 LARGE CELL LYMPHOMA OF INTRA-ABDOMINAL LYMPH NODES: Chronic | ICD-10-CM

## 2019-02-06 DIAGNOSIS — I95.1 POSTURAL HYPOTENSION: ICD-10-CM

## 2019-02-06 DIAGNOSIS — T45.1X5A CHEMOTHERAPY-INDUCED NEUROPATHY: Chronic | ICD-10-CM

## 2019-02-06 DIAGNOSIS — G62.0 CHEMOTHERAPY-INDUCED NEUROPATHY: Chronic | ICD-10-CM

## 2019-02-06 DIAGNOSIS — D64.9 CHRONIC ANEMIA: Chronic | ICD-10-CM

## 2019-02-06 PROCEDURE — 99499 RISK ADDL DX/OHS AUDIT: ICD-10-PCS | Mod: HCNC,S$GLB,, | Performed by: INTERNAL MEDICINE

## 2019-02-06 PROCEDURE — 99214 OFFICE O/P EST MOD 30 MIN: CPT | Mod: HCNC,S$GLB,, | Performed by: INTERNAL MEDICINE

## 2019-02-06 PROCEDURE — A9552 F18 FDG: HCPCS | Mod: HCNC

## 2019-02-06 PROCEDURE — 78815 NM PET CT ROUTINE: ICD-10-PCS | Mod: 26,HCNC,PS, | Performed by: RADIOLOGY

## 2019-02-06 PROCEDURE — 3078F PR MOST RECENT DIASTOLIC BLOOD PRESSURE < 80 MM HG: ICD-10-PCS | Mod: HCNC,CPTII,S$GLB, | Performed by: INTERNAL MEDICINE

## 2019-02-06 PROCEDURE — 99999 PR PBB SHADOW E&M-EST. PATIENT-LVL V: CPT | Mod: PBBFAC,HCNC,, | Performed by: INTERNAL MEDICINE

## 2019-02-06 PROCEDURE — 99999 PR PBB SHADOW E&M-EST. PATIENT-LVL V: ICD-10-PCS | Mod: PBBFAC,HCNC,, | Performed by: INTERNAL MEDICINE

## 2019-02-06 PROCEDURE — 99214 PR OFFICE/OUTPT VISIT, EST, LEVL IV, 30-39 MIN: ICD-10-PCS | Mod: HCNC,S$GLB,, | Performed by: INTERNAL MEDICINE

## 2019-02-06 PROCEDURE — 1101F PT FALLS ASSESS-DOCD LE1/YR: CPT | Mod: HCNC,CPTII,S$GLB, | Performed by: INTERNAL MEDICINE

## 2019-02-06 PROCEDURE — 3074F PR MOST RECENT SYSTOLIC BLOOD PRESSURE < 130 MM HG: ICD-10-PCS | Mod: HCNC,CPTII,S$GLB, | Performed by: INTERNAL MEDICINE

## 2019-02-06 PROCEDURE — 3074F SYST BP LT 130 MM HG: CPT | Mod: HCNC,CPTII,S$GLB, | Performed by: INTERNAL MEDICINE

## 2019-02-06 PROCEDURE — 3078F DIAST BP <80 MM HG: CPT | Mod: HCNC,CPTII,S$GLB, | Performed by: INTERNAL MEDICINE

## 2019-02-06 PROCEDURE — 78815 PET IMAGE W/CT SKULL-THIGH: CPT | Mod: 26,HCNC,PS, | Performed by: RADIOLOGY

## 2019-02-06 PROCEDURE — 1101F PR PT FALLS ASSESS DOC 0-1 FALLS W/OUT INJ PAST YR: ICD-10-PCS | Mod: HCNC,CPTII,S$GLB, | Performed by: INTERNAL MEDICINE

## 2019-02-06 PROCEDURE — 99499 UNLISTED E&M SERVICE: CPT | Mod: HCNC,S$GLB,, | Performed by: INTERNAL MEDICINE

## 2019-02-06 PROCEDURE — 78815 PET IMAGE W/CT SKULL-THIGH: CPT | Mod: TC,HCNC

## 2019-02-06 RX ORDER — FUROSEMIDE 20 MG/1
20 TABLET ORAL DAILY
Refills: 11 | COMMUNITY
Start: 2018-12-24 | End: 2019-02-12 | Stop reason: SDUPTHER

## 2019-02-06 NOTE — TELEPHONE ENCOUNTER
Telephoned patient to advise per Dr. Gil and Dr. Matson's discussion to only stop Losartan but continue taking Metoprolol.  Unable to speak to patient left a voice mail message for return call and will keep trying to reach her

## 2019-02-06 NOTE — TELEPHONE ENCOUNTER
Recalled patient and advised accordingly continue Metoprolol stop Losartan only.  Follow up on Tuesday with Magdiel ESTEBAN as scheduled and she will discuss with Dr. Gil her status and or any other changes needed.

## 2019-02-06 NOTE — PROGRESS NOTES
Subjective:       Patient ID: Violet Swenson is a 78 y.o. female.    Chief Complaint: No chief complaint on file.    HPI HPI This 78 year old  lady  comes for follow up of her th diffuse large cell lymphoma.   A CT of   the abdomen done on 04/05/2017, done because of abdominal pain ,  was reported as showing a 6.9 x 7.0 x 8.4 cm soft  tissue mass in the right lower quadrant in the terminal ileum abutting the   cecum. There was adjacent conglomerate adenopathy in the right lower quadrant   mesentery.     The patient had a colonoscopy that showed a lesion in the terminal ileum.     She underwent a right hemicolectomy and terminal ileum removal. 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  She had a Ct/PET that showed evidence of surgery and some non specific findings, but no evidence of activer disease elsewhere.  An ECHO showed normal cardiac ejection fraction, as wella s a MUGA.  She was seen cardiology and cleared for ADRIAMYCIN administration.  She was negative for Hep B/C and HIV  Bone marrow was negative.     The patient started  R-CHOP. Ttreatmment  She tolerated the treatment with some minor difficulties. After 3 cycles, she had a repeat CT/PET  There was no activity in the abdomen.  There was development of ground glass opacities/infiltrates in both lungs which were hypermetabolic although the SUV was not reported.  We discussed the imaging studies with Radiology and Pulmonary.  Dr Corral of the Pulmonary Department favored the findings to be due to pulmonary congestion.  Her troponin was  normal, but her BNP was markedly elevated at 1,103.  She was started on Lasix by dr Corral and asked to have a  Ct in few weeks  The patient   had evaluation by Cardiology ( dr Gil).It was felt had developed CHF., with a decrease in her ejection raction to 47%., elevated BNP and imaging studies.  The patient indicated her leg  edema and SOB   improved on lasix.  Repeat PET showed clearance of all the infiltrates  Since, she has had a complete work including cardiac catheterization wirh negative findings.  A repeat CB/PET done  showed   clearance of the previous infiltrates       She comes for follow up. She had a CT/PET in 2018 that shows no evidence of recurrence. Repeat CT/PET 2018  And 2019 showed also no evidence of recurrence  She has neuropathy of hands, with numbness and some pain.  She came in today after having had her Ct/PET.  She got up, felt faint as if as if she was going to pass out. She needed assistance from my medical assistant personnel.  She tells me she has been having this episodes at home specially on standing ALLERGIES:see med acrd     MEDICATIONS: See MedCard.     PREVIOUS SURGERIES: Appendectomy in , tonsillectomy at age 19, gastric   bypass with incidental cholecystectomy, bilateral knee replacement in .     SOCIAL HISTORY: She is . She had two natural children, and one adopted   one. The adopted child has . She lives in [place] with her   . She smoked for two years, averaging a pack a day. She stopped 35   years ago or so. Denies any alcohol intake. She worked in [employer].     FAMILY HISTORY: Father  of colon cancer. Younger brother had leukemia that  transform into a lymphoma. Sister had pancreatic cancer              Review of Systems   Constitutional: Negative.    HENT: Negative.    Eyes: Negative.    Respiratory: Negative.  Negative for cough and wheezing.    Cardiovascular: Negative.  Negative for chest pain.   Gastrointestinal: Negative.    Genitourinary: Negative.    Neurological: Negative.    Psychiatric/Behavioral: Negative.        Objective:      Physical Exam   Constitutional: She is oriented to person, place, and time. She appears well-developed. No distress.   HENT:   Head: Normocephalic.   Right Ear: Tympanic membrane, external ear and ear canal normal.   Left Ear: Tympanic  membrane, external ear and ear canal normal.   Nose: Nose normal. Right sinus exhibits no maxillary sinus tenderness and no frontal sinus tenderness. Left sinus exhibits no maxillary sinus tenderness and no frontal sinus tenderness.   Mouth/Throat: Oropharynx is clear and moist and mucous membranes are normal.   Teeth normal.  Gums normal.   Eyes: Conjunctivae and lids are normal. Pupils are equal, round, and reactive to light.   Neck: Normal carotid pulses, no hepatojugular reflux and no JVD present. Carotid bruit is not present. No tracheal deviation present. No thyroid mass and no thyromegaly present.   Cardiovascular: Normal rate, regular rhythm, S1 normal, S2 normal, normal heart sounds and intact distal pulses. Exam reveals no gallop and no friction rub.   No murmur heard.  Carotid exam normal   Pulmonary/Chest: Effort normal and breath sounds normal. No accessory muscle usage. No respiratory distress. She has no wheezes. She has no rales. She exhibits no tenderness.   Abdominal: Soft. Normal appearance. She exhibits no distension and no mass. There is no splenomegaly or hepatomegaly. There is no tenderness. There is no rebound and no guarding.   Musculoskeletal: Normal range of motion. She exhibits no edema or tenderness.        Right hand: Normal.        Left hand: Normal.       Lymphadenopathy:     She has no cervical adenopathy.     She has no axillary adenopathy.        Right: No inguinal and no supraclavicular adenopathy present.        Left: No inguinal and no supraclavicular adenopathy present.   Neurological: She is alert and oriented to person, place, and time. She has normal strength. No cranial nerve deficit. Coordination normal.   Skin: Skin is warm and dry. No rash noted. She is not diaphoretic. No cyanosis or erythema. No pallor. Nails show no clubbing.   Psychiatric: She has a normal mood and affect. Her behavior is normal. Judgment and thought content normal.       Wt Readings from Last 3  Encounters:   01/24/19 67.1 kg (147 lb 14.9 oz)   12/19/18 70.5 kg (155 lb 6.8 oz)   11/30/18 72.6 kg (160 lb)     Temp Readings from Last 3 Encounters:   12/19/18 97 °F (36.1 °C) (Tympanic)   11/30/18 98.1 °F (36.7 °C) (Oral)   11/28/18 97.5 °F (36.4 °C) (Oral)     BP Readings from Last 3 Encounters:   01/24/19 (!) 148/84   12/19/18 (!) 148/88   11/30/18 134/67     Pulse Readings from Last 3 Encounters:   01/24/19 78   12/19/18 88   11/30/18 64       Assessment:       1. 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 Postural hypotension        Plan:       BP was 93/60 when she had the near syncopal episode.  She recovered rapidly.  Symptoms are those of postural hypotension. Is nopticed she os on ,Lopressor and Cozzaar. She ahs a BP amchine at home but she lukas not been checking ehr BP.  Asked to take her BP 3-4 time a day for the next few days. She will hold her BP medicines x 3 days and then contact her cardiologist, dr Gil about the readings     Lab Results   Component Value Date    WBC 10.14 02/06/2019    HGB 11.4 (L) 02/06/2019    HCT 36.0 (L) 02/06/2019     (H) 02/06/2019     02/06/2019     .  Lab Results   Component Value Date    CREATININE 1.4 02/06/2019     Lab Results   Component Value Date    ALT 31 02/06/2019    AST 45 (H) 02/06/2019    ALKPHOS 61 02/06/2019    BILITOT 0.4 02/06/2019       She was tols that the Ct/PET came out Ok without evidence of recurrence of her lymphoma.  She will see me in 3 months with a cbc and a cmp

## 2019-02-07 ENCOUNTER — TELEPHONE (OUTPATIENT)
Dept: CARDIOLOGY | Facility: CLINIC | Age: 79
End: 2019-02-07

## 2019-02-07 NOTE — TELEPHONE ENCOUNTER
Recalled patient and advised for now hold Imdur 30 mg but contact office and resume if feels any chest discomfort.Stressed again keep appointment on Tuesday with APhillips NP.  Also given direct call back number to Cardiology for any other problems or concerns.

## 2019-02-07 NOTE — TELEPHONE ENCOUNTER
Received call from patient stating that she was already not taking Losartan since October 2018 and she thought there was some confusion  Currently taking Lasix 20 mg daily, Imdur 30 mg nightly and Metoprolol 25 mg bid  Today's b/p 105/71 no dizziness just weak, has lost 90 pounds since starting with cancer and trying to add protein drinks and foods to diet  Dr. Gil- please advise

## 2019-02-12 ENCOUNTER — TELEPHONE (OUTPATIENT)
Dept: CARDIOLOGY | Facility: CLINIC | Age: 79
End: 2019-02-12

## 2019-02-12 ENCOUNTER — OFFICE VISIT (OUTPATIENT)
Dept: CARDIOLOGY | Facility: CLINIC | Age: 79
End: 2019-02-12
Payer: MEDICARE

## 2019-02-12 ENCOUNTER — LAB VISIT (OUTPATIENT)
Dept: LAB | Facility: HOSPITAL | Age: 79
End: 2019-02-12
Payer: MEDICARE

## 2019-02-12 ENCOUNTER — CLINICAL SUPPORT (OUTPATIENT)
Dept: CARDIOLOGY | Facility: CLINIC | Age: 79
End: 2019-02-12
Payer: MEDICARE

## 2019-02-12 VITALS
DIASTOLIC BLOOD PRESSURE: 64 MMHG | BODY MASS INDEX: 25.21 KG/M2 | HEART RATE: 74 BPM | WEIGHT: 147.69 LBS | SYSTOLIC BLOOD PRESSURE: 98 MMHG | HEIGHT: 64 IN

## 2019-02-12 DIAGNOSIS — I10 ESSENTIAL HYPERTENSION: Chronic | ICD-10-CM

## 2019-02-12 DIAGNOSIS — E78.2 MIXED HYPERLIPIDEMIA: Chronic | ICD-10-CM

## 2019-02-12 DIAGNOSIS — Z95.0 PACEMAKER: Chronic | ICD-10-CM

## 2019-02-12 DIAGNOSIS — I48.0 PAROXYSMAL ATRIAL FIBRILLATION: Chronic | ICD-10-CM

## 2019-02-12 DIAGNOSIS — Z95.0 CARDIAC PACEMAKER IN SITU: ICD-10-CM

## 2019-02-12 DIAGNOSIS — N18.30 STAGE 3 CHRONIC KIDNEY DISEASE: Chronic | ICD-10-CM

## 2019-02-12 DIAGNOSIS — I25.10 CORONARY ARTERY DISEASE INVOLVING NATIVE CORONARY ARTERY OF NATIVE HEART WITHOUT ANGINA PECTORIS: Chronic | ICD-10-CM

## 2019-02-12 DIAGNOSIS — C85.83 LARGE CELL LYMPHOMA OF INTRA-ABDOMINAL LYMPH NODES: Chronic | ICD-10-CM

## 2019-02-12 DIAGNOSIS — C83.32 DIFFUSE LARGE B-CELL LYMPHOMA OF INTRATHORACIC LYMPH NODES: ICD-10-CM

## 2019-02-12 DIAGNOSIS — I25.5 ISCHEMIC CARDIOMYOPATHY: Primary | Chronic | ICD-10-CM

## 2019-02-12 DIAGNOSIS — I44.2 CHB (COMPLETE HEART BLOCK): ICD-10-CM

## 2019-02-12 LAB
ANION GAP SERPL CALC-SCNC: 7 MMOL/L
BUN SERPL-MCNC: 38 MG/DL
CALCIUM SERPL-MCNC: 9.2 MG/DL
CHLORIDE SERPL-SCNC: 112 MMOL/L
CO2 SERPL-SCNC: 22 MMOL/L
CREAT SERPL-MCNC: 1.3 MG/DL
EST. GFR  (AFRICAN AMERICAN): 46 ML/MIN/1.73 M^2
EST. GFR  (NON AFRICAN AMERICAN): 40 ML/MIN/1.73 M^2
GLUCOSE SERPL-MCNC: 83 MG/DL
POTASSIUM SERPL-SCNC: 5.5 MMOL/L
SODIUM SERPL-SCNC: 141 MMOL/L

## 2019-02-12 PROCEDURE — 36415 COLL VENOUS BLD VENIPUNCTURE: CPT | Mod: HCNC

## 2019-02-12 PROCEDURE — 99214 PR OFFICE/OUTPT VISIT, EST, LEVL IV, 30-39 MIN: ICD-10-PCS | Mod: HCNC,S$GLB,, | Performed by: NURSE PRACTITIONER

## 2019-02-12 PROCEDURE — 93280 PM DEVICE PROGR EVAL DUAL: CPT | Mod: HCNC,S$GLB,, | Performed by: INTERNAL MEDICINE

## 2019-02-12 PROCEDURE — 99999 PR PBB SHADOW E&M-EST. PATIENT-LVL V: ICD-10-PCS | Mod: PBBFAC,HCNC,, | Performed by: NURSE PRACTITIONER

## 2019-02-12 PROCEDURE — 1101F PT FALLS ASSESS-DOCD LE1/YR: CPT | Mod: HCNC,CPTII,S$GLB, | Performed by: NURSE PRACTITIONER

## 2019-02-12 PROCEDURE — 93280 PACEMAKER PROGRAMMING: ICD-10-PCS | Mod: HCNC,S$GLB,, | Performed by: INTERNAL MEDICINE

## 2019-02-12 PROCEDURE — 3078F PR MOST RECENT DIASTOLIC BLOOD PRESSURE < 80 MM HG: ICD-10-PCS | Mod: HCNC,CPTII,S$GLB, | Performed by: NURSE PRACTITIONER

## 2019-02-12 PROCEDURE — 99999 PR PBB SHADOW E&M-EST. PATIENT-LVL V: CPT | Mod: PBBFAC,HCNC,, | Performed by: NURSE PRACTITIONER

## 2019-02-12 PROCEDURE — 3078F DIAST BP <80 MM HG: CPT | Mod: HCNC,CPTII,S$GLB, | Performed by: NURSE PRACTITIONER

## 2019-02-12 PROCEDURE — 1101F PR PT FALLS ASSESS DOC 0-1 FALLS W/OUT INJ PAST YR: ICD-10-PCS | Mod: HCNC,CPTII,S$GLB, | Performed by: NURSE PRACTITIONER

## 2019-02-12 PROCEDURE — 3074F SYST BP LT 130 MM HG: CPT | Mod: HCNC,CPTII,S$GLB, | Performed by: NURSE PRACTITIONER

## 2019-02-12 PROCEDURE — 3074F PR MOST RECENT SYSTOLIC BLOOD PRESSURE < 130 MM HG: ICD-10-PCS | Mod: HCNC,CPTII,S$GLB, | Performed by: NURSE PRACTITIONER

## 2019-02-12 PROCEDURE — 80048 BASIC METABOLIC PNL TOTAL CA: CPT | Mod: HCNC

## 2019-02-12 PROCEDURE — 99214 OFFICE O/P EST MOD 30 MIN: CPT | Mod: HCNC,S$GLB,, | Performed by: NURSE PRACTITIONER

## 2019-02-12 RX ORDER — FUROSEMIDE 20 MG/1
20 TABLET ORAL DAILY PRN
Refills: 11
Start: 2019-02-12 | End: 2019-04-30 | Stop reason: DRUGHIGH

## 2019-02-12 NOTE — PROGRESS NOTES
Subjective:   Patient ID:  Violet Swenson is a 78 y.o. female who presents for follow up of Coronary Artery Disease; Hypertension; and Hyperlipidemia      HPI   Patient presents to clinic today for BP management. 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   Cath in Sept 2017 that showed single vessel CAD (large patent stent to OM).   Patient presents to clinic for evaluation of low BP readings she has been obtaining at home. Seen by Oncology earlier this month and had BP 93/61.  Patient states that BP has been running 100's/70's. Has been holding Imdur since 2/6/19 and hasn't taken Losartan since Oct 2018. Has continued to take Metoprolol. Echo in Oct 2018 showed LVEF 50-55% with no WMA.   Patient is orthostatic today in clinic.   Denies any chest pain, SOB, orthopnea, PND, near syncope, syncope, palpitations, edema. Has no CNS complaints to suggest TIA or CVA. Gets dizzy when she stands. Is taking Lasix 20mg daily.  No abnormal bleeding on ASA and plavix. No OAC due to anemia issues that has required transfusion. Has lost 90 lbs since starting cancer treatment.   Statin stopped due to myalgias did trial of Pravastatin and seems to be tolerating.    Past Medical History:   Diagnosis Date    Age-related osteoporosis without current pathological fracture 8/20/2018    Anemia     Anxiety     Arthritis     Atrial flutter     Cancer     lymphoma Large cell B    CHF (congestive heart failure)     Chronic anemia 4/26/2017    Chronic midline low back pain with right-sided sciatica 8/20/2018    Coronary artery disease     01/2015 [facility] patent LCX. 50% stenosis in LAD and RCA.      Depression     Disorder of kidney and ureter     Encounter for blood transfusion     GERD (gastroesophageal reflux disease)     Gout, arthritis     Heart failure     Hx of psychiatric care      Hyperlipidemia     Hypertension     Hypothyroidism     Immune deficiency disorder     Kidney disease     Lung nodule     RML--stable    Obesity     Pacemaker     Metronic    Paroxysmal atrial fibrillation 3/15/2018    Pneumonia     Polyneuropathy     chemo induced    Psychiatric problem     Tobacco dependence     quit     Trouble in sleeping        Past Surgical History:   Procedure Laterality Date    APPENDECTOMY  1966 approx    BIOPSY-BONE MARROW N/A 5/15/2017    [facility]    CARDIAC PACEMAKER PLACEMENT  2015    CHOLECYSTECTOMY  1993    incidental at time of gastric bypass    COLECTOMY-PARTIAL N/A 2017    [facility] OR    COLON SURGERY Right 2017    hemicolectomy    COLONOSCOPY N/A 2018    Performed [facility] Sa[facility]l Arthur III, [facility] ENDO    COLONOSCOPY N/A 2017    [facility]ye [facility]amorado [facility] ENDO    CORONARY ANGIOPLASTY  2014    CORONARY STENT PLACEMENT  2014    EGD (ESOPHAGOGASTRODUODENOSCOPY) N/A 2018    Per[facility], [facility] ENDO    EXPLORATORY-LAPAROTOMY N/A 2017    [facility] OR    GASTRIC BYPASS      with incidental choly    HEART CATH-BILATERAL N/A 2017    Performed by Nader Gil [facility] CATH LAB    HERNIA REPAIR      IBWSBCVVR-JPXC-N-CATH N/A 5/15/2017    [facility] OR    JOINT REPLACEMENT Bilateral     3 months apart    LYSIS-ADHESION N/A 2017    [facility] OR    REMOVAL-PORT-A-CATH N/A 2018    [facility]formed [facility] Nima Abdullahi [facility] CATH LAB    TONSILLECTOMY         Social History     Tobacco Use    Smoking status: Former Smoker     Packs/day: 2.00     Years: 6.00     Pack years: 12.00     Last attempt to quit: 3/15/1976     Years since quittin.9    Smokeless tobacco: Never Used   Substance Use Topics     "Alcohol use: No     Alcohol/week: 0.0 oz    Drug use: No       Family History   Problem Relation Age of Onset    Heart disease Mother     Hypertension Mother     Cataracts Mother     Stomach cancer Father         "ulcers that turned to cancer"    Cancer Father         stomach    Pancreatic cancer Sister     Cancer Sister         pancreatic    Leukemia Brother         "leukemia which led to intestinal cancer"    Cataracts Brother     Cancer Brother         leukemia then later stomach cancer    Drug abuse Son     Cancer Son         prostate cancer    Prostate cancer Son     COPD Daughter     Asthma Daughter     Hypertension Maternal Grandfather     Stroke Maternal Grandfather     Alcohol abuse Neg Hx     Diabetes Neg Hx     Mental retardation Neg Hx     Mental illness Neg Hx        Current Outpatient Medications   Medication Sig    allopurinol (ZYLOPRIM) 300 MG tablet Take 1 tablet (300 mg total) by mouth once daily.    ALPRAZolam (XANAX) 0.25 MG tablet TAKE ONE TABLET BY MOUTH TWICE DAILY AS NEEDED FOR ANXIETY    ascorbic acid, vitamin C, (VITAMIN C) 100 MG tablet Take by mouth once daily.    aspirin (ECOTRIN) 81 MG EC tablet Take 81 mg by mouth nightly.     calcitRIOL (ROCALTROL) 0.25 MCG Cap Take 1 capsule (0.25 mcg total) by mouth every Mon, Wed, Fri.    clopidogrel (PLAVIX) 75 mg tablet TAKE ONE TABLET BY MOUTH EVERY DAY    COLCRYS 0.6 mg tablet Take 1 tablet (0.6 mg total) by mouth once daily.    diclofenac sodium 1 % Gel Apply 2 g topically once daily. Apply 2 g over painful joints once or twice a day.    ergocalciferol (ERGOCALCIFEROL) 50,000 unit Cap Take 1 capsule (50,000 Units total) by mouth every 7 days.    fluticasone (FLONASE) 50 mcg/actuation nasal spray 2 sprays by Each Nare route once daily. (Patient taking differently: 2 sprays by Each Nare route daily as needed. )    furosemide (LASIX) 20 MG tablet Take 20 mg by mouth once daily.    gabapentin (NEURONTIN) 100 MG " capsule TAKE TWO CAPSULES BY MOUTH THREE TIMES DAILY    iron-vitamin C 100-250 mg, ICAR-C, 100-250 mg Tab TAKE ONE TABLET BY MOUTH EVERY DAY    levothyroxine (SYNTHROID) 75 MCG tablet TAKE 1 TABLET(75 MCG) BY MOUTH BEFORE BREAKFAST    meloxicam (MOBIC) 7.5 MG tablet TAKE ONE TABLET BY MOUTH EVERY DAY AS NEEDED FOR PAIN    metoprolol tartrate (LOPRESSOR) 25 MG tablet TAKE ONE TABLET BY MOUTH TWICE DAILY    mirtazapine (REMERON SOL-TAB) 15 MG disintegrating tablet DISSOLVE ONE TABLET BY MOUTH NIGHTLY    multivitamin (ONE DAILY MULTIVITAMIN) per tablet Take 1 tablet by mouth once daily.    pravastatin (PRAVACHOL) 40 MG tablet Take 1 tablet (40 mg total) by mouth once daily.    ranitidine (ZANTAC) 150 MG capsule Take 150 mg by mouth nightly.     sertraline (ZOLOFT) 100 MG tablet TAKE 1.5 TABLETS BY MOUTH ONCE DAILY    sodium bicarbonate 650 MG tablet Take 1 tablet (650 mg total) by mouth 2 (two) times daily.    ZINC ACETATE ORAL Take 250 mg by mouth once daily.     loratadine (CLARITIN) 10 mg tablet Take 1 tablet (10 mg total) by mouth once daily.    losartan (COZAAR) 25 MG tablet Take 1 tablet (25 mg total) by mouth once daily. (Patient taking differently: Take 25 mg by mouth once daily. Patient says has not taken since October 2018)     Current Facility-Administered Medications   Medication    denosumab (PROLIA) injection 60 mg     Current Outpatient Medications on File Prior to Visit   Medication Sig    allopurinol (ZYLOPRIM) 300 MG tablet Take 1 tablet (300 mg total) by mouth once daily.    ALPRAZolam (XANAX) 0.25 MG tablet TAKE ONE TABLET BY MOUTH TWICE DAILY AS NEEDED FOR ANXIETY    ascorbic acid, vitamin C, (VITAMIN C) 100 MG tablet Take by mouth once daily.    aspirin (ECOTRIN) 81 MG EC tablet Take 81 mg by mouth nightly.     calcitRIOL (ROCALTROL) 0.25 MCG Cap Take 1 capsule (0.25 mcg total) by mouth every Mon, Wed, Fri.    clopidogrel (PLAVIX) 75 mg tablet TAKE ONE TABLET BY MOUTH EVERY DAY     COLCRYS 0.6 mg tablet Take 1 tablet (0.6 mg total) by mouth once daily.    diclofenac sodium 1 % Gel Apply 2 g topically once daily. Apply 2 g over painful joints once or twice a day.    ergocalciferol (ERGOCALCIFEROL) 50,000 unit Cap Take 1 capsule (50,000 Units total) by mouth every 7 days.    fluticasone (FLONASE) 50 mcg/actuation nasal spray 2 sprays by Each Nare route once daily. (Patient taking differently: 2 sprays by Each Nare route daily as needed. )    furosemide (LASIX) 20 MG tablet Take 20 mg by mouth once daily.    gabapentin (NEURONTIN) 100 MG capsule TAKE TWO CAPSULES BY MOUTH THREE TIMES DAILY    iron-vitamin C 100-250 mg, ICAR-C, 100-250 mg Tab TAKE ONE TABLET BY MOUTH EVERY DAY    levothyroxine (SYNTHROID) 75 MCG tablet TAKE 1 TABLET(75 MCG) BY MOUTH BEFORE BREAKFAST    meloxicam (MOBIC) 7.5 MG tablet TAKE ONE TABLET BY MOUTH EVERY DAY AS NEEDED FOR PAIN    metoprolol tartrate (LOPRESSOR) 25 MG tablet TAKE ONE TABLET BY MOUTH TWICE DAILY    mirtazapine (REMERON SOL-TAB) 15 MG disintegrating tablet DISSOLVE ONE TABLET BY MOUTH NIGHTLY    multivitamin (ONE DAILY MULTIVITAMIN) per tablet Take 1 tablet by mouth once daily.    pravastatin (PRAVACHOL) 40 MG tablet Take 1 tablet (40 mg total) by mouth once daily.    ranitidine (ZANTAC) 150 MG capsule Take 150 mg by mouth nightly.     sertraline (ZOLOFT) 100 MG tablet TAKE 1.5 TABLETS BY MOUTH ONCE DAILY    sodium bicarbonate 650 MG tablet Take 1 tablet (650 mg total) by mouth 2 (two) times daily.    ZINC ACETATE ORAL Take 250 mg by mouth once daily.     loratadine (CLARITIN) 10 mg tablet Take 1 tablet (10 mg total) by mouth once daily.    losartan (COZAAR) 25 MG tablet Take 1 tablet (25 mg total) by mouth once daily. (Patient taking differently: Take 25 mg by mouth once daily. Patient says has not taken since October 2018)    [DISCONTINUED] isosorbide mononitrate (IMDUR) 30 MG 24 hr tablet TAKE ONE TABLET BY MOUTH AT BEDTIME      Current Facility-Administered Medications on File Prior to Visit   Medication    denosumab (PROLIA) injection 60 mg       Review of Systems   Constitution: Positive for weakness and malaise/fatigue. Negative for diaphoresis and weight gain.   HENT: Negative for hoarse voice.    Eyes: Negative for double vision and visual disturbance.   Cardiovascular: Negative for chest pain, claudication, cyanosis, dyspnea on exertion, irregular heartbeat, leg swelling, near-syncope, orthopnea, palpitations, paroxysmal nocturnal dyspnea and syncope.   Respiratory: Negative for cough, hemoptysis, shortness of breath and snoring.    Hematologic/Lymphatic: Negative for bleeding problem. Bruises/bleeds easily.   Skin: Negative for color change and poor wound healing.   Musculoskeletal: Negative for muscle cramps, muscle weakness and myalgias.        Using rolling walker    Gastrointestinal: Negative for bloating, abdominal pain, change in bowel habit, diarrhea, heartburn, hematemesis, hematochezia, melena and nausea.   Neurological: Positive for dizziness. Negative for excessive daytime sleepiness, headaches, light-headedness, loss of balance and numbness.   Psychiatric/Behavioral: Negative for memory loss. The patient does not have insomnia.    Allergic/Immunologic: Negative for hives.       Objective:   Physical Exam   Constitutional: She is oriented to person, place, and time. She appears well-developed and well-nourished.   Neck: Neck supple. No JVD present.   Cardiovascular: Normal rate, regular rhythm, normal heart sounds and normal pulses. Exam reveals no friction rub.   No murmur heard.  Pulmonary/Chest: Effort normal and breath sounds normal. No respiratory distress. She has no wheezes. She has no rales.   Left device pocket WNL    Abdominal: Soft. Bowel sounds are normal. She exhibits no distension.   Musculoskeletal: She exhibits no edema or tenderness.   Neurological: She is alert and oriented to person, place, and  "time.   Skin: Skin is warm and dry. No rash noted.   Psychiatric: She has a normal mood and affect. Her behavior is normal.   Nursing note and vitals reviewed.    Vitals:    02/12/19 1015 02/12/19 1036 02/12/19 1037   BP: 122/74 120/80 98/64   BP Location:  Right arm Right arm   Patient Position:  Sitting Standing   Pulse: 74     Weight: 67 kg (147 lb 11.3 oz)     Height: 5' 4" (1.626 m)       Lab Results   Component Value Date    CHOL 92 (L) 07/19/2018    CHOL 138 04/05/2017    CHOL 114 (L) 11/07/2016     Lab Results   Component Value Date    HDL 55 07/19/2018    HDL 58 04/05/2017    HDL 51 11/07/2016     Lab Results   Component Value Date    LDLCALC 26.4 (L) 07/19/2018    LDLCALC 62.8 (L) 04/05/2017    LDLCALC 41.8 (L) 11/07/2016     Lab Results   Component Value Date    TRIG 53 07/19/2018    TRIG 86 04/05/2017    TRIG 106 11/07/2016     Lab Results   Component Value Date    CHOLHDL 59.8 (H) 07/19/2018    CHOLHDL 42.0 04/05/2017    CHOLHDL 44.7 11/07/2016       Chemistry        Component Value Date/Time     02/06/2019 0910    K 4.8 02/06/2019 0910     02/06/2019 0910    CO2 25 02/06/2019 0910    BUN 38 (H) 02/06/2019 0910    CREATININE 1.4 02/06/2019 0910    GLU 93 02/06/2019 0910        Component Value Date/Time    CALCIUM 9.4 02/06/2019 0910    ALKPHOS 61 02/06/2019 0910    AST 45 (H) 02/06/2019 0910    ALT 31 02/06/2019 0910    BILITOT 0.4 02/06/2019 0910    ESTGFRAFRICA 42 (A) 02/06/2019 0910    EGFRNONAA 36 (A) 02/06/2019 0910          Lab Results   Component Value Date    TSH 6.606 (H) 12/19/2018     Lab Results   Component Value Date    INR 1.0 11/30/2018    INR 1.0 09/01/2017    INR 1.0 04/05/2017     Lab Results   Component Value Date    WBC 10.14 02/06/2019    HGB 11.4 (L) 02/06/2019    HCT 36.0 (L) 02/06/2019     (H) 02/06/2019     02/06/2019     BMP  Sodium   Date Value Ref Range Status   02/06/2019 142 136 - 145 mmol/L Final     Potassium   Date Value Ref Range Status "   02/06/2019 4.8 3.5 - 5.1 mmol/L Final     Chloride   Date Value Ref Range Status   02/06/2019 109 95 - 110 mmol/L Final     CO2   Date Value Ref Range Status   02/06/2019 25 23 - 29 mmol/L Final     BUN, Bld   Date Value Ref Range Status   02/06/2019 38 (H) 8 - 23 mg/dL Final     Creatinine   Date Value Ref Range Status   02/06/2019 1.4 0.5 - 1.4 mg/dL Final     Calcium   Date Value Ref Range Status   02/06/2019 9.4 8.7 - 10.5 mg/dL Final     Anion Gap   Date Value Ref Range Status   02/06/2019 8 8 - 16 mmol/L Final     eGFR if    Date Value Ref Range Status   02/06/2019 42 (A) >60 mL/min/1.73 m^2 Final     eGFR if non    Date Value Ref Range Status   02/06/2019 36 (A) >60 mL/min/1.73 m^2 Final     Comment:     Calculation used to obtain the estimated glomerular filtration  rate (eGFR) is the CKD-EPI equation.        Estimated Creatinine Clearance: 31.7 mL/min (based on SCr of 1.4 mg/dL).    Assessment:     1. Ischemic cardiomyopathy    2. Coronary artery disease : multiple vessels, s/p PTCA    3. Essential hypertension    4. Mixed hyperlipidemia    5. Pacemaker    6. Paroxysmal atrial fibrillation    7. Stage 3 chronic kidney disease    8. Diffuse large B-cell lymphoma of intrathoracic lymph nodes    9. 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      + orthostatic today in office   PPM checked today and shows normal function . No a-fib since Nov 2018  No CNS Complaints to suggest TIA or CVA  No evidence of volume overload on exam   Has no angina or equivalent   Plan:     Patient advised to only use her Lasix PRN  She will hydrate herself at home today  And log BP for the rest of the week . Will arrange phone for Friday.   RTC 3 months or sooner if needed. Follow up with Dr. Gil as suzy

## 2019-02-12 NOTE — TELEPHONE ENCOUNTER
Labs stable other than her Potassium is a little elevated. Please advise her to limit her intake of K rich foods

## 2019-02-15 DIAGNOSIS — M1A.09X0 IDIOPATHIC CHRONIC GOUT OF MULTIPLE SITES WITHOUT TOPHUS: ICD-10-CM

## 2019-02-17 RX ORDER — ALLOPURINOL 300 MG/1
TABLET ORAL
Qty: 90 TABLET | Refills: 3 | Status: SHIPPED | OUTPATIENT
Start: 2019-02-17 | End: 2020-01-31 | Stop reason: SDUPTHER

## 2019-02-17 RX ORDER — COLCHICINE 0.6 MG/1
TABLET, FILM COATED ORAL
Qty: 30 TABLET | Refills: 11 | Status: SHIPPED | OUTPATIENT
Start: 2019-02-17 | End: 2020-01-31 | Stop reason: SDUPTHER

## 2019-02-25 ENCOUNTER — LAB VISIT (OUTPATIENT)
Dept: LAB | Facility: HOSPITAL | Age: 79
End: 2019-02-25
Payer: MEDICARE

## 2019-02-25 DIAGNOSIS — E03.9 HYPOTHYROIDISM (ACQUIRED): ICD-10-CM

## 2019-02-25 LAB
T4 FREE SERPL-MCNC: 0.74 NG/DL
TSH SERPL DL<=0.005 MIU/L-ACNC: 5.07 UIU/ML

## 2019-02-25 PROCEDURE — 36415 COLL VENOUS BLD VENIPUNCTURE: CPT | Mod: HCNC

## 2019-02-25 PROCEDURE — 84439 ASSAY OF FREE THYROXINE: CPT | Mod: HCNC

## 2019-02-25 PROCEDURE — 84443 ASSAY THYROID STIM HORMONE: CPT | Mod: HCNC

## 2019-02-26 ENCOUNTER — TELEPHONE (OUTPATIENT)
Dept: CARDIOLOGY | Facility: CLINIC | Age: 79
End: 2019-02-26

## 2019-02-26 DIAGNOSIS — M81.0 AGE-RELATED OSTEOPOROSIS WITHOUT CURRENT PATHOLOGICAL FRACTURE: Primary | ICD-10-CM

## 2019-02-26 NOTE — TELEPHONE ENCOUNTER
Follow up in July needs to be rescheduled with EP Dr. Martinez or Dr. Gil double checking with Dr. Gil re: appointment      ----- Message from Nader Gil MD sent at 2/25/2019  8:50 PM CST -----  REVIEWED HAS AFIB WILL NEED TO BE SEEN EARLIER

## 2019-02-27 DIAGNOSIS — D50.8 OTHER IRON DEFICIENCY ANEMIA: ICD-10-CM

## 2019-02-27 RX ORDER — IRON,CARBONYL/ASCORBIC ACID 100-250 MG
TABLET ORAL
Qty: 30 TABLET | Refills: 3 | Status: SHIPPED | OUTPATIENT
Start: 2019-02-27 | End: 2019-06-27 | Stop reason: SDUPTHER

## 2019-03-01 DIAGNOSIS — F41.8 SITUATIONAL ANXIETY: ICD-10-CM

## 2019-03-01 NOTE — TELEPHONE ENCOUNTER
Left voice mail message for patient to return call so to make aware of need to see EP-- Dr. Martinez on 3/21/19 at 11AM      ----- Message from Nader Gil MD sent at 2/26/2019  6:28 PM CST -----  EP  ----- Message -----  From: Shanna Henry LPN  Sent: 2/26/2019   4:00 PM  To: Nader Gil MD    Does she need to see you (Jeffery) sooner or EP ?  ----- Message -----  From: Nader Gil MD  Sent: 2/25/2019   8:50 PM  To: Jeffery GIMENEZ Staff    REVIEWED HAS AFIB WILL NEED TO BE SEEN EARLIER

## 2019-03-01 NOTE — TELEPHONE ENCOUNTER
Patient returned call and advised of EP appointment.  Voiced some complaints of blood pressure fluctuations lowest 107/66 and highest 157/92  Advised continue to monitor and bring with visit but if she gets higher or develop any symptoms to contact office .  Current medications Lopressor 25 mg bid, Losartan 25 mg every morning and Lasix 20 mg M-W-F    Dr. Gil -please advise further

## 2019-03-04 RX ORDER — ALPRAZOLAM 0.25 MG/1
TABLET ORAL
Qty: 60 TABLET | OUTPATIENT
Start: 2019-03-04

## 2019-03-06 ENCOUNTER — LAB VISIT (OUTPATIENT)
Dept: LAB | Facility: HOSPITAL | Age: 79
End: 2019-03-06
Attending: FAMILY MEDICINE
Payer: MEDICARE

## 2019-03-06 ENCOUNTER — INFUSION (OUTPATIENT)
Dept: RHEUMATOLOGY | Facility: HOSPITAL | Age: 79
End: 2019-03-06
Attending: INTERNAL MEDICINE
Payer: MEDICARE

## 2019-03-06 ENCOUNTER — OFFICE VISIT (OUTPATIENT)
Dept: RHEUMATOLOGY | Facility: CLINIC | Age: 79
End: 2019-03-06
Payer: MEDICARE

## 2019-03-06 VITALS — WEIGHT: 149.69 LBS | BODY MASS INDEX: 25.69 KG/M2

## 2019-03-06 VITALS
HEIGHT: 64 IN | WEIGHT: 149.69 LBS | DIASTOLIC BLOOD PRESSURE: 91 MMHG | SYSTOLIC BLOOD PRESSURE: 142 MMHG | BODY MASS INDEX: 25.56 KG/M2 | HEART RATE: 81 BPM

## 2019-03-06 DIAGNOSIS — M15.9 PRIMARY OSTEOARTHRITIS INVOLVING MULTIPLE JOINTS: ICD-10-CM

## 2019-03-06 DIAGNOSIS — E55.9 VITAMIN D DEFICIENCY: ICD-10-CM

## 2019-03-06 DIAGNOSIS — M81.0 AGE-RELATED OSTEOPOROSIS WITHOUT CURRENT PATHOLOGICAL FRACTURE: Primary | ICD-10-CM

## 2019-03-06 DIAGNOSIS — M81.0 AGE-RELATED OSTEOPOROSIS WITHOUT CURRENT PATHOLOGICAL FRACTURE: ICD-10-CM

## 2019-03-06 DIAGNOSIS — M1A.09X0 IDIOPATHIC CHRONIC GOUT OF MULTIPLE SITES WITHOUT TOPHUS: ICD-10-CM

## 2019-03-06 LAB
ALBUMIN SERPL BCP-MCNC: 3.3 G/DL
ALP SERPL-CCNC: 86 U/L
ALT SERPL W/O P-5'-P-CCNC: 34 U/L
ANION GAP SERPL CALC-SCNC: 6 MMOL/L
AST SERPL-CCNC: 43 U/L
BILIRUB SERPL-MCNC: 0.3 MG/DL
BUN SERPL-MCNC: 28 MG/DL
CALCIUM SERPL-MCNC: 10.1 MG/DL
CHLORIDE SERPL-SCNC: 116 MMOL/L
CO2 SERPL-SCNC: 22 MMOL/L
CREAT SERPL-MCNC: 1.1 MG/DL
EST. GFR  (AFRICAN AMERICAN): 56 ML/MIN/1.73 M^2
EST. GFR  (NON AFRICAN AMERICAN): 48 ML/MIN/1.73 M^2
GLUCOSE SERPL-MCNC: 51 MG/DL
POTASSIUM SERPL-SCNC: 4.9 MMOL/L
PROT SERPL-MCNC: 6.9 G/DL
SODIUM SERPL-SCNC: 144 MMOL/L

## 2019-03-06 PROCEDURE — 63600175 PHARM REV CODE 636 W HCPCS: Mod: HCNC,JG | Performed by: INTERNAL MEDICINE

## 2019-03-06 PROCEDURE — 99999 PR PBB SHADOW E&M-EST. PATIENT-LVL III: ICD-10-PCS | Mod: PBBFAC,HCNC,, | Performed by: INTERNAL MEDICINE

## 2019-03-06 PROCEDURE — 1101F PR PT FALLS ASSESS DOC 0-1 FALLS W/OUT INJ PAST YR: ICD-10-PCS | Mod: HCNC,CPTII,S$GLB, | Performed by: INTERNAL MEDICINE

## 2019-03-06 PROCEDURE — 99215 PR OFFICE/OUTPT VISIT, EST, LEVL V, 40-54 MIN: ICD-10-PCS | Mod: HCNC,S$GLB,, | Performed by: INTERNAL MEDICINE

## 2019-03-06 PROCEDURE — 99499 UNLISTED E&M SERVICE: CPT | Mod: HCNC,S$GLB,, | Performed by: INTERNAL MEDICINE

## 2019-03-06 PROCEDURE — 1101F PT FALLS ASSESS-DOCD LE1/YR: CPT | Mod: HCNC,CPTII,S$GLB, | Performed by: INTERNAL MEDICINE

## 2019-03-06 PROCEDURE — 99215 OFFICE O/P EST HI 40 MIN: CPT | Mod: HCNC,S$GLB,, | Performed by: INTERNAL MEDICINE

## 2019-03-06 PROCEDURE — 3080F PR MOST RECENT DIASTOLIC BLOOD PRESSURE >= 90 MM HG: ICD-10-PCS | Mod: HCNC,CPTII,S$GLB, | Performed by: INTERNAL MEDICINE

## 2019-03-06 PROCEDURE — 80053 COMPREHEN METABOLIC PANEL: CPT | Mod: HCNC

## 2019-03-06 PROCEDURE — 3077F SYST BP >= 140 MM HG: CPT | Mod: HCNC,CPTII,S$GLB, | Performed by: INTERNAL MEDICINE

## 2019-03-06 PROCEDURE — 99499 RISK ADDL DX/OHS AUDIT: ICD-10-PCS | Mod: HCNC,S$GLB,, | Performed by: INTERNAL MEDICINE

## 2019-03-06 PROCEDURE — 96372 THER/PROPH/DIAG INJ SC/IM: CPT | Mod: HCNC

## 2019-03-06 PROCEDURE — 3077F PR MOST RECENT SYSTOLIC BLOOD PRESSURE >= 140 MM HG: ICD-10-PCS | Mod: HCNC,CPTII,S$GLB, | Performed by: INTERNAL MEDICINE

## 2019-03-06 PROCEDURE — 99999 PR PBB SHADOW E&M-EST. PATIENT-LVL III: CPT | Mod: PBBFAC,HCNC,, | Performed by: INTERNAL MEDICINE

## 2019-03-06 PROCEDURE — 3080F DIAST BP >= 90 MM HG: CPT | Mod: HCNC,CPTII,S$GLB, | Performed by: INTERNAL MEDICINE

## 2019-03-06 PROCEDURE — 36415 COLL VENOUS BLD VENIPUNCTURE: CPT | Mod: HCNC

## 2019-03-06 RX ORDER — ERGOCALCIFEROL 1.25 MG/1
50000 CAPSULE ORAL
Qty: 12 CAPSULE | Refills: 3 | Status: SHIPPED | OUTPATIENT
Start: 2019-03-06 | End: 2020-03-24

## 2019-03-06 RX ORDER — TRAMADOL HYDROCHLORIDE 50 MG/1
50 TABLET ORAL EVERY 12 HOURS PRN
Qty: 14 TABLET | Refills: 0 | Status: SHIPPED | OUTPATIENT
Start: 2019-03-06 | End: 2020-09-08 | Stop reason: SDUPTHER

## 2019-03-06 RX ORDER — ISOSORBIDE MONONITRATE 30 MG/1
30 TABLET, EXTENDED RELEASE ORAL NIGHTLY
Refills: 11 | COMMUNITY
Start: 2019-02-27 | End: 2019-05-24

## 2019-03-06 RX ADMIN — DENOSUMAB 60 MG: 60 INJECTION SUBCUTANEOUS at 10:03

## 2019-03-06 NOTE — NURSING
Prolia 60 mg q 6 months  Last dose given-8/29/18    Any invasive dental procedures in past 3 months or upcoming 3 months: Denies    Last Rheumatology provider visit- Seen by Dr. PANIAGUA on 3/6/19    Recent labs? 3/6/19;  CKD pt needing repeat labs in 10 days- No   Lab Results   Component Value Date    CALCIUM 10.1 03/06/2019     Lab Results   Component Value Date    CREATININE 1.1 03/06/2019     Lab Results   Component Value Date    ESTGFRAFRICA 56 (A) 03/06/2019     Lab Results   Component Value Date    EGFRNONAA 48 (A) 03/06/2019     Lab Results   Component Value Date    MMIKSFST42RK 19 (L) 08/20/2018          Lot #- [id number]  Expiration Date- 06/21      Prolia 60 mg/ml administered SQ to Right lower abdominal quadrant. Tolerated without any complaints. No adverse reaction noted or reported after 15 minutes of administration. No redness, swelling, or drainage noted to site. Pt instructed on signs and symptoms of reaction to report. Verbalizes understanding.

## 2019-03-06 NOTE — PROGRESS NOTES
RHEUMATOLOGY CLINIC FOLLOW UP VISIT  Chief complaints:-   Follow-up for arthritis.  My back  hurts    HPI:-  Violet Zhao a 79 y.o. pleasant female comes in for a follow up visit with above chief complaints.  She follows up in the Rheumatology Clinic for osteoporosis, gout and osteoarthritis.  She complains of intermittent acute low back pain associated with numbness tingling shooting down her right leg all the way up to the toes.  She denies any significant pain over her hands today. Has numbness over fingertips.  She denies any flare-up of gout since last visit.  She is taking allopurinol without any problem.  She has chronic kidney disease associated with hyperparathyroidism and vitamin-D deficiency.  She is under care of nephrologist Dr. Gomez. She is in prolia for osteoporosis . No falls or fractures since last visit.      Review of Systems   Constitutional: Negative for chills and fever.   HENT: Negative for congestion and sore throat.    Eyes: Negative for blurred vision and redness.   Respiratory: Negative for cough and shortness of breath.    Cardiovascular: Negative for chest pain and leg swelling.   Gastrointestinal: Negative for abdominal pain.   Genitourinary: Negative for dysuria.   Musculoskeletal: Positive for back pain and joint pain. Negative for falls, myalgias and neck pain.   Skin: Negative for rash.   Neurological: Negative for headaches.   Endo/Heme/Allergies: Does not bruise/bleed easily.   Psychiatric/Behavioral: Negative for memory loss. The patient does not have insomnia.        Past Medical History:   Diagnosis Date    Age-related osteoporosis without current pathological fracture 8/20/2018    Anemia     Anxiety     Arthritis     Atrial flutter     Cancer     lymphoma Large cell B    CHF (congestive heart failure)     Chronic anemia 4/26/2017    Chronic midline low back pain with right-sided sciatica 8/20/2018     Coronary artery disease     01/2015 [facility] patent LCX. 50% stenosis in LAD and RCA.      Depression     Disorder of kidney and ureter     Encounter for blood transfusion     GERD (gastroesophageal reflux disease)     Gout, arthritis     Heart failure     Hx of psychiatric care     Hyperlipidemia     Hypertension     Hypothyroidism     Immune deficiency disorder     Kidney disease     Lung nodule 2014    RML--stable    Obesity     Pacemaker     Metronic    Paroxysmal atrial fibrillation 3/15/2018    Pneumonia     Polyneuropathy     chemo induced    Psychiatric problem     Tobacco dependence     quit 1976    Trouble in sleeping        Past Surgical History:   Procedure Laterality Date    APPENDECTOMY  1966 approx    BIOPSY-BONE MARROW N/A 5/15/2017    [facility]    CARDIAC PACEMAKER PLACEMENT  01/22/2015    CHOLECYSTECTOMY  1993    incidental at time of gastric bypass    COLECTOMY-PARTIAL N/A 4/7/2017    [facility] OR    COLON SURGERY Right 2017    hemicolectomy    COLONOSCOPY N/A 11/28/2018    [facility] Sa[facility]l [facility] III, [facility] ENDO    COLONOSCOPY N/A 4/6/2017    [facility]orado [facility] ENDO    CORONARY ANGIOPLASTY  02/2014    CORONARY STENT PLACEMENT  02/05/2014    EGD (ESOPHAGOGASTRODUODENOSCOPY) N/A 11/28/2018    [facility] ENDO    EXPLORATORY-LAPAROTOMY N/A 4/7/2017    [facility] OR    GASTRIC BYPASS  1993    with incidental choly    HEART CATH-BILATERAL N/A 9/6/2017    [facility] Nader Gil [facility] CATH LAB    HERNIA REPAIR      ZVENCCDXA-CTLT-N-CATH N/A 5/15/2017    [facility] OR    JOINT REPLACEMENT Bilateral 2009    3 months apart    LYSIS-ADHESION N/A 4/7/2017    [facility] OR    REMOVAL-PORT-A-CATH N/A 4/12/2018    [facility] Nima Abdullahi [facility] at [facility] CATH  "LAB    TONSILLECTOMY          Social History     Tobacco Use    Smoking status: Former Smoker     Packs/day: 2.00     Years: 6.00     Pack years: 12.00     Last attempt to quit: 3/15/1976     Years since quittin.0    Smokeless tobacco: Never Used   Substance Use Topics    Alcohol use: No     Alcohol/week: 0.0 oz    Drug use: No       Family History   Problem Relation Age of Onset    Heart disease Mother     Hypertension Mother     Cataracts Mother     Stomach cancer Father         "ulcers that turned to cancer"    Cancer Father         stomach    Pancreatic cancer Sister     Cancer Sister         pancreatic    Leukemia Brother         "leukemia which led to intestinal cancer"    Cataracts Brother     Cancer Brother         leukemia then later stomach cancer    Drug abuse Son     Cancer Son         prostate cancer    Prostate cancer Son     COPD Daughter     Asthma Daughter     Hypertension Maternal Grandfather     Stroke Maternal Grandfather     Alcohol abuse Neg Hx     Diabetes Neg Hx     Mental retardation Neg Hx     Mental illness Neg Hx        Review of patient's allergies indicates:   Allergen Reactions    Corticosteroids (glucocorticoids) Nausea Only and Other (See Comments)     Stomach pain, dizziness, headache    Oxycodone Other (See Comments)     Blood pressure dropped           Physical examination:-    Vitals:    19 0959   BP: (!) 142/91   Pulse: 81   Weight: 67.9 kg (149 lb 11.1 oz)   Height: 5' 4" (1.626 m)   PainSc: 0-No pain       Physical Exam   Constitutional: She is oriented to person, place, and time and well-developed, well-nourished, and in no distress. No distress.   HENT:   Head: Normocephalic.   Mouth/Throat: Oropharynx is clear and moist.   Eyes: Conjunctivae and EOM are normal. Pupils are equal, round, and reactive to light.   Neck: Normal range of motion. Neck supple.   Cardiovascular: Normal rate and intact distal pulses.   Pulmonary/Chest: " Effort normal. No respiratory distress.   Abdominal: Soft. There is no tenderness.   Musculoskeletal:   No synovitis over small joints of hands or feet.  No effusion over large joints.Mild tenderness over lumbar region.    Neurological: She is alert and oriented to person, place, and time. No cranial nerve deficit.   Skin: Skin is warm. No rash noted. No erythema.   Psychiatric: Mood and affect normal.   Nursing note and vitals reviewed.      Medication List with Changes/Refills   New Medications    TRAMADOL (ULTRAM) 50 MG TABLET    Take 1 tablet (50 mg total) by mouth every 12 (twelve) hours as needed for Pain.   Current Medications    ALLOPURINOL (ZYLOPRIM) 300 MG TABLET    TAKE ONE TABLET BY MOUTH EVERY DAY    ALPRAZOLAM (XANAX) 0.25 MG TABLET    TAKE ONE TABLET BY MOUTH TWICE DAILY AS NEEDED FOR ANXIETY    ASCORBIC ACID, VITAMIN C, (VITAMIN C) 100 MG TABLET    Take by mouth once daily.    ASPIRIN (ECOTRIN) 81 MG EC TABLET    Take 81 mg by mouth nightly.     CALCITRIOL (ROCALTROL) 0.25 MCG CAP    Take 1 capsule (0.25 mcg total) by mouth every Mon, Wed, Fri.    CLOPIDOGREL (PLAVIX) 75 MG TABLET    TAKE ONE TABLET BY MOUTH EVERY DAY    COLCRYS 0.6 MG TABLET    TAKE ONE TABLET BY MOUTH EVERY DAY    DICLOFENAC SODIUM 1 % GEL    Apply 2 g topically once daily. Apply 2 g over painful joints once or twice a day.    FLUTICASONE (FLONASE) 50 MCG/ACTUATION NASAL SPRAY    2 sprays by Each Nare route once daily.    FUROSEMIDE (LASIX) 20 MG TABLET    Take 1 tablet (20 mg total) by mouth daily as needed (swelling , weight gain or SOB).    GABAPENTIN (NEURONTIN) 100 MG CAPSULE    TAKE TWO CAPSULES BY MOUTH THREE TIMES DAILY    IRON-VITAMIN C 100-250 MG, ICAR-C, 100-250 MG TAB    TAKE ONE TABLET BY MOUTH EVERY DAY    ISOSORBIDE MONONITRATE (IMDUR) 30 MG 24 HR TABLET    Take 30 mg by mouth nightly.    LEVOTHYROXINE (SYNTHROID) 75 MCG TABLET    TAKE 1 TABLET(75 MCG) BY MOUTH BEFORE BREAKFAST    LORATADINE (CLARITIN) 10 MG TABLET     Take 1 tablet (10 mg total) by mouth once daily.    LOSARTAN (COZAAR) 25 MG TABLET    Take 1 tablet (25 mg total) by mouth once daily.    MELOXICAM (MOBIC) 7.5 MG TABLET    TAKE ONE TABLET BY MOUTH EVERY DAY AS NEEDED FOR PAIN    METOPROLOL TARTRATE (LOPRESSOR) 25 MG TABLET    TAKE ONE TABLET BY MOUTH TWICE DAILY    MIRTAZAPINE (REMERON SOL-TAB) 15 MG DISINTEGRATING TABLET    DISSOLVE ONE TABLET BY MOUTH NIGHTLY    MULTIVITAMIN (ONE DAILY MULTIVITAMIN) PER TABLET    Take 1 tablet by mouth once daily.    PRAVASTATIN (PRAVACHOL) 40 MG TABLET    Take 1 tablet (40 mg total) by mouth once daily.    RANITIDINE (ZANTAC) 150 MG CAPSULE    Take 150 mg by mouth nightly.     SERTRALINE (ZOLOFT) 100 MG TABLET    TAKE 1.5 TABLETS BY MOUTH ONCE DAILY    SODIUM BICARBONATE 650 MG TABLET    Take 1 tablet (650 mg total) by mouth 2 (two) times daily.    ZINC ACETATE ORAL    Take 250 mg by mouth once daily.    Changed and/or Refilled Medications    Modified Medication Previous Medication    ERGOCALCIFEROL (ERGOCALCIFEROL) 50,000 UNIT CAP ergocalciferol (ERGOCALCIFEROL) 50,000 unit Cap       Take 1 capsule (50,000 Units total) by mouth every 7 days.    Take 1 capsule (50,000 Units total) by mouth every 7 days.       Assessment/Plans:-  # Idiopathic chronic gout of multiple sites without tophus  Stable.  No flare-up since last visit.  Continue allopurinol 300 mg once daily.   Be cautious with colchicine because of her chronic kidney disease.    # Age-related osteoporosis without current pathological fracture  Osteoporosis without any history of fragility fracture.  Bisphosphonate contraindicated because of chronic kidney disease.  Tolerating prolia well. Continue Prolia.  Discussed in detail about all adverse effects of the medication including osteonecrosis of the jaw and atypical femoral fractures.  She is at high risk for hypocalcemia within the 1st week after Prolia.  Check calcium 10 days after Prolia injection.    #  Hyperparathyroidism  Hyperparathyroidism likely secondary to chronic kidney disease complicated by vitamin-D deficiency.  Management per Dr. Gomez.    # Vitamin D deficiency    Continue weekly supplementation.  - ergocalciferol (ERGOCALCIFEROL) 50,000 unit Cap; Take 1 capsule (50,000 Units total) by mouth every 7 days.  Dispense: 12 capsule; Refill: 3    # Primary osteoarthritis involving multiple joints  Couldn't take tylenol because of liver damage. Couldn't take more NSAID's because of CKD. Try low dose daily qhs prn tramadol. Pt counseled on use of norco. Risks versus benefits including potential for dependency discussed and literature sent home with patient today.  Pt counseled on potential side effects including constipation, worsening sleep apnea and impairment of operation of equipment or vehicles. Patient has been instructed to avoid driving or use of equipment or machinery while under the influence of this medication. Do not drink alcohol while on this medication.   Pt counseled on using meds only as needed and goal is to try and reduce the frequency and dose used. Avoid long term use. Pt verbalized understanding of all described above.  search done through [employer] system.   - traMADol (ULTRAM) 50 mg tablet; Take 1 tablet (50 mg total) by mouth every 12 (twelve) hours as needed for Pain.  Dispense: 14 tablet; Refill: 0     # Follow-up in about 6 months (around 9/6/2019).    Disclaimer: This note was prepared using voice recognition system and is likely to have sound alike errors and is not proof read.  Please call me with any questions.

## 2019-03-21 ENCOUNTER — CLINICAL SUPPORT (OUTPATIENT)
Dept: CARDIOLOGY | Facility: CLINIC | Age: 79
End: 2019-03-21
Payer: MEDICARE

## 2019-03-21 ENCOUNTER — INITIAL CONSULT (OUTPATIENT)
Dept: CARDIOLOGY | Facility: CLINIC | Age: 79
End: 2019-03-21
Payer: MEDICARE

## 2019-03-21 VITALS
SYSTOLIC BLOOD PRESSURE: 132 MMHG | DIASTOLIC BLOOD PRESSURE: 90 MMHG | BODY MASS INDEX: 26.08 KG/M2 | HEART RATE: 60 BPM | HEIGHT: 64 IN | WEIGHT: 152.75 LBS

## 2019-03-21 DIAGNOSIS — Z95.0 CARDIAC PACEMAKER IN SITU: ICD-10-CM

## 2019-03-21 DIAGNOSIS — I48.91 ATRIAL FIBRILLATION, UNSPECIFIED TYPE: Primary | ICD-10-CM

## 2019-03-21 DIAGNOSIS — I25.5 ISCHEMIC CARDIOMYOPATHY: Chronic | ICD-10-CM

## 2019-03-21 DIAGNOSIS — I48.91 ATRIAL FIBRILLATION, UNSPECIFIED TYPE: ICD-10-CM

## 2019-03-21 DIAGNOSIS — I48.0 PAROXYSMAL ATRIAL FIBRILLATION: Primary | Chronic | ICD-10-CM

## 2019-03-21 PROCEDURE — 99999 PR PBB SHADOW E&M-EST. PATIENT-LVL III: CPT | Mod: PBBFAC,HCNC,, | Performed by: INTERNAL MEDICINE

## 2019-03-21 PROCEDURE — 3075F SYST BP GE 130 - 139MM HG: CPT | Mod: HCNC,CPTII,S$GLB, | Performed by: INTERNAL MEDICINE

## 2019-03-21 PROCEDURE — 3080F DIAST BP >= 90 MM HG: CPT | Mod: HCNC,CPTII,S$GLB, | Performed by: INTERNAL MEDICINE

## 2019-03-21 PROCEDURE — 3080F PR MOST RECENT DIASTOLIC BLOOD PRESSURE >= 90 MM HG: ICD-10-PCS | Mod: HCNC,CPTII,S$GLB, | Performed by: INTERNAL MEDICINE

## 2019-03-21 PROCEDURE — 99205 OFFICE O/P NEW HI 60 MIN: CPT | Mod: HCNC,S$GLB,, | Performed by: INTERNAL MEDICINE

## 2019-03-21 PROCEDURE — 3075F PR MOST RECENT SYSTOLIC BLOOD PRESS GE 130-139MM HG: ICD-10-PCS | Mod: HCNC,CPTII,S$GLB, | Performed by: INTERNAL MEDICINE

## 2019-03-21 PROCEDURE — 99999 PR PBB SHADOW E&M-EST. PATIENT-LVL III: ICD-10-PCS | Mod: PBBFAC,HCNC,, | Performed by: INTERNAL MEDICINE

## 2019-03-21 PROCEDURE — 93000 ELECTROCARDIOGRAM COMPLETE: CPT | Mod: HCNC,S$GLB,, | Performed by: INTERNAL MEDICINE

## 2019-03-21 PROCEDURE — 1101F PT FALLS ASSESS-DOCD LE1/YR: CPT | Mod: HCNC,CPTII,S$GLB, | Performed by: INTERNAL MEDICINE

## 2019-03-21 PROCEDURE — 99205 PR OFFICE/OUTPT VISIT, NEW, LEVL V, 60-74 MIN: ICD-10-PCS | Mod: HCNC,S$GLB,, | Performed by: INTERNAL MEDICINE

## 2019-03-21 PROCEDURE — 93000 EKG 12-LEAD: ICD-10-PCS | Mod: HCNC,S$GLB,, | Performed by: INTERNAL MEDICINE

## 2019-03-21 PROCEDURE — 1101F PR PT FALLS ASSESS DOC 0-1 FALLS W/OUT INJ PAST YR: ICD-10-PCS | Mod: HCNC,CPTII,S$GLB, | Performed by: INTERNAL MEDICINE

## 2019-03-21 NOTE — LETTER
March 21, 2019      Nader Gil MD  [address]  [place] [address]           [facility]  [address]  [place] [address]  Phone: [phone]  Fax: [phone]          Patient: Violet Swenson   MR Number: [id number]   YOB: 1940   Date of Visit: 3/21/2019       Dear Dr. Nader Gil:    Thank you for referring Violet Swenson to me for evaluation. Attached you will find relevant portions of my assessment and plan of care.    If you have questions, please do not hesitate to call me. I look forward to following Violet Swenson along with you.    Sincerely,    Kareem Martinez MD    Enclosure  CC:  No Recipients    If you would like to receive this communication electronically, please contact [email or web address] or [phone] to request more information on [employer] Link access.    For providers and/or their staff who would like to refer a patient to [facility], please contact us through our one-stop-shop provider referral line, [facility], at [phone].    If you feel you have received this communication in error or would no longer like to receive these types of communications, please e-mail [email or web address]

## 2019-03-21 NOTE — PROGRESS NOTES
Subjective:    Patient ID:  Violet Swenson is a 79 y.o. female who presents for evaluation of Atrial Fibrillation      79 yoF SSS s/p DC PM here for AF management. She had AV block s/p DC PM 4/14. Generator change 4/18. She had no known history of AF until 2017 when she had AF associated with chemotherapy. 11/22/18 she had a 7h episode of AF. No other AMS. She was asymptomatic. CHADSVASC of 5. No prior TIA/CVA, syncope.     Echo 1/19:  CONCLUSIONS     1 - Low normal to mildly depressed left ventricular systolic function (EF 50-55%).     2 - Normal left ventricular diastolic function.     3 - Upper limit of normal ascending aorta.     4 - No wall motion abnormalities.     5 - Normal right ventricular systolic function .     6 - Trivial mitral regurgitation.     Past Medical History:  8/20/2018: Age-related osteoporosis without current pathological   fracture  No date: Anemia  No date: Anxiety  No date: Arthritis  No date: Atrial flutter  No date: Cancer      Comment:  lymphoma Large cell B  No date: CHF (congestive heart failure)  4/26/2017: Chronic anemia  8/20/2018: Chronic midline low back pain with right-sided sciatica  No date: Coronary artery disease      Comment:  01/2015 LHC patent LCX. 50% stenosis in LAD and RCA.    No date: Depression  No date: Disorder of kidney and ureter  No date: Encounter for blood transfusion  No date: GERD (gastroesophageal reflux disease)  No date: Gout, arthritis  No date: Heart failure  No date: Hx of psychiatric care  No date: Hyperlipidemia  No date: Hypertension  No date: Hypothyroidism  No date: Immune deficiency disorder  No date: Kidney disease  2014: Lung nodule      Comment:  RML--stable  No date: Obesity  No date: Pacemaker      Comment:  Metronic  3/15/2018: Paroxysmal atrial fibrillation  No date: Pneumonia  No date: Polyneuropathy      Comment:  chemo induced  No date: Psychiatric problem  No date: Tobacco dependence      Comment:  quit 1976  No date:  Trouble in sleeping    Past Surgical History:  1966 approx: APPENDECTOMY  5/15/2017: BIOPSY-BONE MARROW; N/A      Comment:  Performed by Rubin Breaux MD at [facility] OR  01/22/2015: CARDIAC PACEMAKER PLACEMENT  1993: CHOLECYSTECTOMY      Comment:  incidental at time of gastric bypass  4/7/2017: COLECTOMY-PARTIAL; N/A      Comment:  Performed by Orlando Moulton MD at [facility] OR  2017: COLON SURGERY; Right      Comment:  hemicolectomy  11/28/2018: COLONOSCOPY; N/A      Comment:  Performed by Saúl Arthur III, MD at [facility] ENDO  4/6/2017: COLONOSCOPY; N/A      Comment:  Performed by Tye Enamorado MD at [facility] ENDO  02/2014: CORONARY ANGIOPLASTY  02/05/2014: CORONARY STENT PLACEMENT  11/28/2018: EGD (ESOPHAGOGASTRODUODENOSCOPY); N/A      Comment:  Performed by Saúl Arthur III, MD at [facility] ENDO  4/7/2017: EXPLORATORY-LAPAROTOMY; N/A      Comment:  Performed by Orlando Moulton MD at [facility] OR  1993: GASTRIC BYPASS      Comment:  with incidental choly  9/6/2017: HEART CATH-BILATERAL; N/A      Comment:  Performed by Nader Gil MD at [facility] CATH LAB  No date: HERNIA REPAIR  5/15/2017: [id number]-CATH; N/A      Comment:  Performed by Orlando Moulton MD at [facility] OR  2009: JOINT REPLACEMENT; Bilateral      Comment:  3 months apart  4/7/2017: LYSIS-ADHESION; N/A      Comment:  Performed by Orlando Moulton MD at [facility] OR  4/12/2018: REMOVAL-PORT-A-CATH; N/A      Comment:  Performed by Nima Abdullahi MD at [facility] CATH LAB  1959: TONSILLECTOMY    Social History    Socioeconomic History      Marital status:       Spouse name: Not on file      Number of children: 4      Years of education: Not on file      Highest education level: Not on file    Social Needs      Financial resource strain: Not on file      Food insecurity - worry: Not on file      Food insecurity - inability: Not on file      Transportation needs - medical: Not on file      Transportation needs - non-medical: Not on file    Occupational History      Not on  "file    Tobacco Use      Smoking status: Former Smoker        Packs/day: 2.00        Years: 6.00        Pack years: 12        Quit date: 3/15/1976        Years since quittin.0      Smokeless tobacco: Never Used    Substance and Sexual Activity      Alcohol use: No        Alcohol/week: 0.0 oz      Drug use: No      Sexual activity: No    Other Topics      Concerns:        Patient feels they ought to cut down on drinking/drug use: Not Asked        Patient annoyed by others criticizing their drinking/drug use: Not Asked        Patient has felt bad or guilty about drinking/drug use: Not Asked        Patient has had a drink/used drugs as an eye opener in the AM: Not Asked    Social History Narrative      , lives with . She is a retired . 4 children (3 natural born, 1 adopted)- 2 ; 2x  (currently 17 years); son has prostate cancer, daughter COPD,  cardiac difficulty. She still drives. Does not have a Living will or Advanced Directive.      Review of patient's family history indicates:  Problem: Heart disease      Relation: Mother          Age of Onset: (Not Specified)  Problem: Hypertension      Relation: Mother          Age of Onset: (Not Specified)  Problem: Cataracts      Relation: Mother          Age of Onset: (Not Specified)  Problem: Stomach cancer      Relation: Father          Age of Onset: (Not Specified)          Comment: "ulcers that turned to cancer"  Problem: Cancer      Relation: Father          Age of Onset: (Not Specified)          Comment: stomach  Problem: Pancreatic cancer      Relation: Sister          Age of Onset: (Not Specified)  Problem: Cancer      Relation: Sister          Age of Onset: (Not Specified)          Comment: pancreatic  Problem: Leukemia      Relation: Brother          Age of Onset: (Not Specified)          Comment: "leukemia which led to intestinal cancer"  Problem: Cataracts      Relation: Brother          Age of Onset: (Not " Specified)  Problem: Cancer      Relation: Brother          Age of Onset: (Not Specified)          Comment: leukemia then later stomach cancer  Problem: Drug abuse      Relation: Son          Age of Onset: (Not Specified)  Problem: Cancer      Relation: Son          Age of Onset: (Not Specified)          Comment: prostate cancer  Problem: Prostate cancer      Relation: Son          Age of Onset: (Not Specified)  Problem: COPD      Relation: Daughter          Age of Onset: (Not Specified)  Problem: Asthma      Relation: Daughter          Age of Onset: (Not Specified)  Problem: Hypertension      Relation: Maternal Grandfather          Age of Onset: (Not Specified)  Problem: Stroke      Relation: Maternal Grandfather          Age of Onset: (Not Specified)  Problem: Alcohol abuse      Relation: Neg Hx          Age of Onset: (Not Specified)  Problem: Diabetes      Relation: Neg Hx          Age of Onset: (Not Specified)  Problem: Mental retardation      Relation: Neg Hx          Age of Onset: (Not Specified)  Problem: Mental illness      Relation: Neg Hx          Age of Onset: (Not Specified)          Review of Systems   Constitution: Negative.   HENT: Negative.    Eyes: Negative.    Cardiovascular: Negative for chest pain, dyspnea on exertion, leg swelling, near-syncope, palpitations and syncope.   Respiratory: Negative.  Negative for shortness of breath.    Endocrine: Negative.    Hematologic/Lymphatic: Negative.    Skin: Negative.    Musculoskeletal: Negative.    Gastrointestinal: Negative.    Genitourinary: Negative.    Neurological: Negative.  Negative for dizziness and light-headedness.   Psychiatric/Behavioral: Negative.    Allergic/Immunologic: Negative.         Objective:    Physical Exam   Constitutional: She is oriented to person, place, and time. She appears well-developed and well-nourished. No distress.   HENT:   Head: Normocephalic and atraumatic.   Eyes: EOM are normal. Pupils are equal, round, and  reactive to light.   Neck: Normal range of motion. No JVD present. No thyromegaly present.   Cardiovascular: Normal rate, regular rhythm, S1 normal, S2 normal and normal heart sounds. PMI is not displaced. Exam reveals no gallop and no friction rub.   No murmur heard.  Pulmonary/Chest: Effort normal and breath sounds normal. No respiratory distress. She has no wheezes. She has no rales.   Abdominal: Soft. Bowel sounds are normal. She exhibits no distension. There is no tenderness. There is no rebound and no guarding.   Musculoskeletal: Normal range of motion. She exhibits no edema or tenderness.   Neurological: She is alert and oriented to person, place, and time. No cranial nerve deficit.   Skin: Skin is warm and dry. No rash noted. No erythema.   Psychiatric: She has a normal mood and affect. Her behavior is normal. Judgment and thought content normal.   Vitals reviewed.    ECG: AV paced rhythm        Assessment:       1. Paroxysmal atrial fibrillation    2. Cardiac pacemaker in situ    3. Ischemic cardiomyopathy         Plan:       79 yoF pAF here for arrhythmia management. I discussed AF and its basic pathophysiology, including its health implications and treatment options with the patient. Specifically, I addressed the need for CVA prophylaxis as well as the goal to reduce symptomatic arrhythmic episodes by pharmacologic and/or procedural methods. With only a single 7h episode over the past 11 months, I do not feel that OAC is warranted at this time. Will monitor for increase in AF burden/frequency and readdress OAC at that time. RTC 6m with PM check.

## 2019-03-27 DIAGNOSIS — E03.9 HYPOTHYROIDISM (ACQUIRED): Chronic | ICD-10-CM

## 2019-03-27 DIAGNOSIS — F41.8 SITUATIONAL ANXIETY: ICD-10-CM

## 2019-03-27 RX ORDER — SODIUM BICARBONATE 650 MG/1
TABLET ORAL
Qty: 60 TABLET | Refills: 11 | Status: SHIPPED | OUTPATIENT
Start: 2019-03-27 | End: 2020-03-30 | Stop reason: SDUPTHER

## 2019-03-27 RX ORDER — LEVOTHYROXINE SODIUM 75 UG/1
TABLET ORAL
Qty: 90 TABLET | Refills: 1 | Status: SHIPPED | OUTPATIENT
Start: 2019-03-27 | End: 2019-10-02 | Stop reason: SDUPTHER

## 2019-03-27 RX ORDER — ALPRAZOLAM 0.25 MG/1
TABLET ORAL
Qty: 60 TABLET | Refills: 0 | Status: SHIPPED | OUTPATIENT
Start: 2019-03-27 | End: 2019-04-03 | Stop reason: SDUPTHER

## 2019-03-27 RX ORDER — METOPROLOL TARTRATE 25 MG/1
TABLET, FILM COATED ORAL
Qty: 60 TABLET | Refills: 11 | Status: SHIPPED | OUTPATIENT
Start: 2019-03-27 | End: 2020-03-30

## 2019-04-03 DIAGNOSIS — F41.8 SITUATIONAL ANXIETY: ICD-10-CM

## 2019-04-03 RX ORDER — ALPRAZOLAM 0.25 MG/1
TABLET ORAL
Qty: 60 TABLET | Refills: 0 | Status: SHIPPED | OUTPATIENT
Start: 2019-04-03 | End: 2019-05-29 | Stop reason: SDUPTHER

## 2019-04-04 ENCOUNTER — PES CALL (OUTPATIENT)
Dept: ADMINISTRATIVE | Facility: CLINIC | Age: 79
End: 2019-04-04

## 2019-04-30 DIAGNOSIS — G62.9 NEUROPATHY: ICD-10-CM

## 2019-04-30 DIAGNOSIS — F41.9 INSOMNIA SECONDARY TO ANXIETY: ICD-10-CM

## 2019-04-30 DIAGNOSIS — F32.9 MAJOR DEPRESSION, CHRONIC: ICD-10-CM

## 2019-04-30 DIAGNOSIS — F41.8 SITUATIONAL ANXIETY: ICD-10-CM

## 2019-04-30 DIAGNOSIS — C85.83 LARGE CELL LYMPHOMA OF INTRA-ABDOMINAL LYMPH NODES: ICD-10-CM

## 2019-04-30 DIAGNOSIS — F51.01 PRIMARY INSOMNIA: ICD-10-CM

## 2019-04-30 DIAGNOSIS — F51.05 INSOMNIA SECONDARY TO ANXIETY: ICD-10-CM

## 2019-04-30 RX ORDER — MIRTAZAPINE 15 MG/1
TABLET, ORALLY DISINTEGRATING ORAL
Qty: 30 TABLET | Refills: 2 | Status: SHIPPED | OUTPATIENT
Start: 2019-04-30 | End: 2019-07-29 | Stop reason: SDUPTHER

## 2019-04-30 RX ORDER — SERTRALINE HYDROCHLORIDE 100 MG/1
TABLET, FILM COATED ORAL
Qty: 90 TABLET | Refills: 1 | Status: SHIPPED | OUTPATIENT
Start: 2019-04-30 | End: 2019-08-28 | Stop reason: SDUPTHER

## 2019-04-30 RX ORDER — FUROSEMIDE 40 MG/1
40 TABLET ORAL
COMMUNITY
End: 2019-05-29 | Stop reason: SDUPTHER

## 2019-04-30 RX ORDER — FUROSEMIDE 40 MG/1
TABLET ORAL
Qty: 40 TABLET | OUTPATIENT
Start: 2019-04-30

## 2019-04-30 RX ORDER — GABAPENTIN 100 MG/1
CAPSULE ORAL
Qty: 180 CAPSULE | Refills: 2 | Status: SHIPPED | OUTPATIENT
Start: 2019-04-30 | End: 2019-07-30 | Stop reason: SDUPTHER

## 2019-04-30 NOTE — TELEPHONE ENCOUNTER
Spoke with pt states she is currently taking Lasix 40 mg once daily every Monday, Wednesday and Friday.       I have updated pt's med list.

## 2019-05-13 NOTE — PROGRESS NOTES
Subjective:   Patient ID:  Violet Swenson is a 79 y.o. female who presents for follow up of Hypertension; Hyperlipidemia; Congestive Heart Failure; and Atrial Fibrillation      HPI  Mrs. Swenson's current conditions include CAD, PPM implant in April 2014 due to SSS/CHB. Had new A-fib in 2017 that was thought to be associated with chemo, Lymphoma Large Cell B, HTN, HLP, CKD, hypothyroid, hyperparathyroid, chemo induced neuropathy.   Had LHC in January 2015 that showed patent OM stent, 50% stenosis to LAD and RCA.   Denies any chest pain, SOB, ESCOBAR,  orthopnea, PND, dizziness, palpitations,  near syncope, syncope, edema or palpitations. Has no symptoms concerning for angina or equivalent. No CNS Complaints to suggest TIA or CVA. Does well with limiting sodium intake.    Last echo in Jan 2019 showed normal LVF 50-55% with normal diastolic dysfunction. Evaluated by Dr. Martinez in March 2019 and felt that given her single 7 hr episode of A-fib within an 11 month time frame, patient didn't need to be anticoagulated at that time. Recommendation made to monitor her A-fib burden and readdress OAC at that time. Of note, has had issues with anemia in the past that has required transfusion.   Statin stopped due to myalgias did trial of Pravastatin and seems to be tolerating  Instructed to only use her Lasix PRN after orthostatic at visit in Feb 2019      Past Medical History:   Diagnosis Date    Age-related osteoporosis without current pathological fracture 8/20/2018    Anemia     Anxiety     Arthritis     Atrial flutter     Cancer     lymphoma Large cell B    CHF (congestive heart failure)     Chronic anemia 4/26/2017    Chronic midline low back pain with right-sided sciatica 8/20/2018    Coronary artery disease     01/2015 LHC patent LCX. 50% stenosis in LAD and RCA.      Depression     Disorder of kidney and ureter     Encounter for blood transfusion     GERD (gastroesophageal reflux disease)      Gout, arthritis     Heart failure     Hx of psychiatric care     Hyperlipidemia     Hypertension     Hypothyroidism     Immune deficiency disorder     Kidney disease     Lung nodule 2014    RML--stable    Obesity     Pacemaker     Metronic    Paroxysmal atrial fibrillation 3/15/2018    Pneumonia     Polyneuropathy     chemo induced    Psychiatric problem     Tobacco dependence     quit 1976    Trouble in sleeping        Past Surgical History:   Procedure Laterality Date    APPENDECTOMY  1966 approx    BIOPSY-BONE MARROW N/A 5/15/2017    [facility]    CARDIAC PACEMAKER PLACEMENT  01/22/2015    CHOLECYSTECTOMY  1993    incidental at time of gastric bypass    COLECTOMY-PARTIAL N/A 4/7/2017    [facility] OR    COLON SURGERY Right 2017    hemicolectomy    COLONOSCOPY N/A 11/28/2018    Performed [facility] Saúl Arthur III, [facility] at [facility] ENDO    COLONOSCOPY N/A 4/6/2017    [facility] Tye [facility]amorado [facility] at [facility] ENDO    CORONARY ANGIOPLASTY  02/2014    CORONARY STENT PLACEMENT  02/05/2014    EGD (ESOPHAGOGASTRODUODENOSCOPY) N/A 11/28/2018    [facility] Sa[facility] ENDO    EXPLORATORY-LAPAROTOMY N/A 4/7/2017    [facility] OR    GASTRIC BYPASS  1993    with incidental choly    HEART CATH-BILATERAL N/A 9/6/2017    Performed [facility] Nader Gil [facility] at [facility] CATH LAB    HERNIA REPAIR      ZKMATHNWK-LSSN-V-CATH N/A 5/15/2017    [facility] OR    JOINT REPLACEMENT Bilateral 2009    3 months apart    LYSIS-ADHESION N/A 4/7/2017    [facility] OR    REMOVAL-PORT-A-CATH N/A 4/12/2018    Performed [facility] Nima Abdullahi [facility] at [facility] CATH LAB    TONSILLECTOMY  1959       Social History     Tobacco Use    Smoking status: Former Smoker     Packs/day: 2.00     Years: 6.00     Pack years: 12.00     Last attempt to quit: 3/15/1976     Years since quitting:  "43.1    Smokeless tobacco: Never Used   Substance Use Topics    Alcohol use: No     Alcohol/week: 0.0 oz    Drug use: No       Family History   Problem Relation Age of Onset    Heart disease Mother     Hypertension Mother     Cataracts Mother     Stomach cancer Father         "ulcers that turned to cancer"    Cancer Father         stomach    Pancreatic cancer Sister     Cancer Sister         pancreatic    Leukemia Brother         "leukemia which led to intestinal cancer"    Cataracts Brother     Cancer Brother         leukemia then later stomach cancer    Drug abuse Son     Cancer Son         prostate cancer    Prostate cancer Son     COPD Daughter     Asthma Daughter     Hypertension Maternal Grandfather     Stroke Maternal Grandfather     Alcohol abuse Neg Hx     Diabetes Neg Hx     Mental retardation Neg Hx     Mental illness Neg Hx        Current Outpatient Medications   Medication Sig    allopurinol (ZYLOPRIM) 300 MG tablet TAKE ONE TABLET BY MOUTH EVERY DAY    ALPRAZolam (XANAX) 0.25 MG tablet TAKE ONE TABLET BY MOUTH TWICE DAILY AS NEEDED FOR ANXIETY    ascorbic acid, vitamin C, (VITAMIN C) 100 MG tablet Take by mouth once daily.    aspirin (ECOTRIN) 81 MG EC tablet Take 81 mg by mouth nightly.     calcitRIOL (ROCALTROL) 0.25 MCG Cap Take 1 capsule (0.25 mcg total) by mouth every Mon, Wed, Fri.    clopidogrel (PLAVIX) 75 mg tablet TAKE ONE TABLET BY MOUTH EVERY DAY    COLCRYS 0.6 mg tablet TAKE ONE TABLET BY MOUTH EVERY DAY    ergocalciferol (ERGOCALCIFEROL) 50,000 unit Cap Take 1 capsule (50,000 Units total) by mouth every 7 days.    fluticasone (FLONASE) 50 mcg/actuation nasal spray 2 sprays by Each Nare route once daily. (Patient taking differently: 2 sprays by Each Nare route daily as needed. )    furosemide (LASIX) 40 MG tablet Take 40 mg by mouth. Take 1 tablet (40mg total) by mouth every Monday,Wednesday and Friday once daily.    gabapentin (NEURONTIN) 100 MG capsule " TAKE TWO CAPSULES BY MOUTH THREE TIMES DAILY    iron-vitamin C 100-250 mg, ICAR-C, 100-250 mg Tab TAKE ONE TABLET BY MOUTH EVERY DAY    isosorbide mononitrate (IMDUR) 30 MG 24 hr tablet Take 30 mg by mouth nightly.    levothyroxine (SYNTHROID) 75 MCG tablet TAKE ONE TABLET BY MOUTH EVERY DAY BEFORE BREAKFAST    losartan (COZAAR) 25 MG tablet Take 1 tablet (25 mg total) by mouth once daily.    meloxicam (MOBIC) 7.5 MG tablet TAKE ONE TABLET BY MOUTH EVERY DAY AS NEEDED FOR PAIN    metoprolol tartrate (LOPRESSOR) 25 MG tablet TAKE ONE TABLET BY MOUTH TWICE DAILY    mirtazapine (REMERON SOL-TAB) 15 MG disintegrating tablet DISSOLVE ONE TABLET BY MOUTH NIGHTLY    multivitamin (ONE DAILY MULTIVITAMIN) per tablet Take 1 tablet by mouth once daily.    pravastatin (PRAVACHOL) 40 MG tablet Take 1 tablet (40 mg total) by mouth once daily.    ranitidine (ZANTAC) 150 MG capsule Take 150 mg by mouth nightly.     sertraline (ZOLOFT) 100 MG tablet TAKE 1 AND 1/2 TABLETS BY MOUTH DAILY    sodium bicarbonate 650 MG tablet TAKE ONE TABLET BY MOUTH TWICE DAILY    traMADol (ULTRAM) 50 mg tablet Take 1 tablet (50 mg total) by mouth every 12 (twelve) hours as needed for Pain.    ZINC ACETATE ORAL Take 250 mg by mouth once daily.     diclofenac sodium 1 % Gel Apply 2 g topically once daily. Apply 2 g over painful joints once or twice a day.    loratadine (CLARITIN) 10 mg tablet Take 1 tablet (10 mg total) by mouth once daily.     Current Facility-Administered Medications   Medication    denosumab (PROLIA) injection 60 mg     Current Outpatient Medications on File Prior to Visit   Medication Sig    allopurinol (ZYLOPRIM) 300 MG tablet TAKE ONE TABLET BY MOUTH EVERY DAY    ALPRAZolam (XANAX) 0.25 MG tablet TAKE ONE TABLET BY MOUTH TWICE DAILY AS NEEDED FOR ANXIETY    ascorbic acid, vitamin C, (VITAMIN C) 100 MG tablet Take by mouth once daily.    aspirin (ECOTRIN) 81 MG EC tablet Take 81 mg by mouth nightly.      calcitRIOL (ROCALTROL) 0.25 MCG Cap Take 1 capsule (0.25 mcg total) by mouth every Mon, Wed, Fri.    clopidogrel (PLAVIX) 75 mg tablet TAKE ONE TABLET BY MOUTH EVERY DAY    COLCRYS 0.6 mg tablet TAKE ONE TABLET BY MOUTH EVERY DAY    ergocalciferol (ERGOCALCIFEROL) 50,000 unit Cap Take 1 capsule (50,000 Units total) by mouth every 7 days.    fluticasone (FLONASE) 50 mcg/actuation nasal spray 2 sprays by Each Nare route once daily. (Patient taking differently: 2 sprays by Each Nare route daily as needed. )    furosemide (LASIX) 40 MG tablet Take 40 mg by mouth. Take 1 tablet (40mg total) by mouth every Monday,Wednesday and Friday once daily.    gabapentin (NEURONTIN) 100 MG capsule TAKE TWO CAPSULES BY MOUTH THREE TIMES DAILY    iron-vitamin C 100-250 mg, ICAR-C, 100-250 mg Tab TAKE ONE TABLET BY MOUTH EVERY DAY    isosorbide mononitrate (IMDUR) 30 MG 24 hr tablet Take 30 mg by mouth nightly.    levothyroxine (SYNTHROID) 75 MCG tablet TAKE ONE TABLET BY MOUTH EVERY DAY BEFORE BREAKFAST    losartan (COZAAR) 25 MG tablet Take 1 tablet (25 mg total) by mouth once daily.    meloxicam (MOBIC) 7.5 MG tablet TAKE ONE TABLET BY MOUTH EVERY DAY AS NEEDED FOR PAIN    metoprolol tartrate (LOPRESSOR) 25 MG tablet TAKE ONE TABLET BY MOUTH TWICE DAILY    mirtazapine (REMERON SOL-TAB) 15 MG disintegrating tablet DISSOLVE ONE TABLET BY MOUTH NIGHTLY    multivitamin (ONE DAILY MULTIVITAMIN) per tablet Take 1 tablet by mouth once daily.    pravastatin (PRAVACHOL) 40 MG tablet Take 1 tablet (40 mg total) by mouth once daily.    ranitidine (ZANTAC) 150 MG capsule Take 150 mg by mouth nightly.     sertraline (ZOLOFT) 100 MG tablet TAKE 1 AND 1/2 TABLETS BY MOUTH DAILY    sodium bicarbonate 650 MG tablet TAKE ONE TABLET BY MOUTH TWICE DAILY    traMADol (ULTRAM) 50 mg tablet Take 1 tablet (50 mg total) by mouth every 12 (twelve) hours as needed for Pain.    ZINC ACETATE ORAL Take 250 mg by mouth once daily.      diclofenac sodium 1 % Gel Apply 2 g topically once daily. Apply 2 g over painful joints once or twice a day.    loratadine (CLARITIN) 10 mg tablet Take 1 tablet (10 mg total) by mouth once daily.     Current Facility-Administered Medications on File Prior to Visit   Medication    denosumab (PROLIA) injection 60 mg       Review of Systems   Constitution: Positive for malaise/fatigue. Negative for diaphoresis and weight gain.   HENT: Negative for hoarse voice.    Eyes: Negative for double vision and visual disturbance.   Cardiovascular: Negative for chest pain, claudication, cyanosis, dyspnea on exertion, irregular heartbeat, leg swelling, near-syncope, orthopnea, palpitations, paroxysmal nocturnal dyspnea and syncope.   Respiratory: Negative for cough, hemoptysis, shortness of breath and snoring.    Hematologic/Lymphatic: Negative for bleeding problem. Bruises/bleeds easily.   Skin: Negative for color change and poor wound healing.   Musculoskeletal: Negative for muscle cramps, muscle weakness and myalgias.        Using rolling walker    Gastrointestinal: Negative for bloating, abdominal pain, change in bowel habit, diarrhea, heartburn, hematemesis, hematochezia, melena and nausea.   Neurological: Positive for dizziness and weakness. Negative for excessive daytime sleepiness, headaches, light-headedness, loss of balance and numbness.   Psychiatric/Behavioral: Negative for memory loss. The patient does not have insomnia.    Allergic/Immunologic: Negative for hives.       Objective:   Physical Exam   Constitutional: She is oriented to person, place, and time. She appears well-developed and well-nourished.   Neck: Neck supple. No JVD present.   Cardiovascular: Normal rate, regular rhythm, normal heart sounds and normal pulses. Exam reveals no friction rub.   No murmur heard.  Pulmonary/Chest: Effort normal and breath sounds normal. No respiratory distress. She has no wheezes. She has no rales.   Left device pocket WNL  "   Abdominal: Soft. Bowel sounds are normal. She exhibits no distension.   Musculoskeletal: She exhibits no edema or tenderness.   Neurological: She is alert and oriented to person, place, and time.   Skin: Skin is warm and dry. No rash noted.   Psychiatric: She has a normal mood and affect. Her behavior is normal.   Nursing note and vitals reviewed.    Vitals:    05/14/19 1030   BP: 114/76   Pulse: 76   Weight: 66.5 kg (146 lb 9.7 oz)   Height: 5' 4" (1.626 m)     Lab Results   Component Value Date    CHOL 92 (L) 07/19/2018    CHOL 138 04/05/2017    CHOL 114 (L) 11/07/2016     Lab Results   Component Value Date    HDL 55 07/19/2018    HDL 58 04/05/2017    HDL 51 11/07/2016     Lab Results   Component Value Date    LDLCALC 26.4 (L) 07/19/2018    LDLCALC 62.8 (L) 04/05/2017    LDLCALC 41.8 (L) 11/07/2016     Lab Results   Component Value Date    TRIG 53 07/19/2018    TRIG 86 04/05/2017    TRIG 106 11/07/2016     Lab Results   Component Value Date    CHOLHDL 59.8 (H) 07/19/2018    CHOLHDL 42.0 04/05/2017    CHOLHDL 44.7 11/07/2016       Chemistry        Component Value Date/Time     03/06/2019 0936    K 4.9 03/06/2019 0936     (H) 03/06/2019 0936    CO2 22 (L) 03/06/2019 0936    BUN 28 (H) 03/06/2019 0936    CREATININE 1.1 03/06/2019 0936    GLU 51 (L) 03/06/2019 0936        Component Value Date/Time    CALCIUM 10.1 03/06/2019 0936    ALKPHOS 86 03/06/2019 0936    AST 43 (H) 03/06/2019 0936    ALT 34 03/06/2019 0936    BILITOT 0.3 03/06/2019 0936    ESTGFRAFRICA 56 (A) 03/06/2019 0936    EGFRNONAA 48 (A) 03/06/2019 0936          Lab Results   Component Value Date    TSH 5.071 (H) 02/25/2019     Lab Results   Component Value Date    INR 1.0 11/30/2018    INR 1.0 09/01/2017    INR 1.0 04/05/2017     Lab Results   Component Value Date    WBC 10.14 02/06/2019    HGB 11.4 (L) 02/06/2019    HCT 36.0 (L) 02/06/2019     (H) 02/06/2019     02/06/2019     BMP  Sodium   Date Value Ref Range Status "   03/06/2019 144 136 - 145 mmol/L Final     Potassium   Date Value Ref Range Status   03/06/2019 4.9 3.5 - 5.1 mmol/L Final     Chloride   Date Value Ref Range Status   03/06/2019 116 (H) 95 - 110 mmol/L Final     CO2   Date Value Ref Range Status   03/06/2019 22 (L) 23 - 29 mmol/L Final     BUN, Bld   Date Value Ref Range Status   03/06/2019 28 (H) 8 - 23 mg/dL Final     Creatinine   Date Value Ref Range Status   03/06/2019 1.1 0.5 - 1.4 mg/dL Final     Calcium   Date Value Ref Range Status   03/06/2019 10.1 8.7 - 10.5 mg/dL Final     Anion Gap   Date Value Ref Range Status   03/06/2019 6 (L) 8 - 16 mmol/L Final     eGFR if    Date Value Ref Range Status   03/06/2019 56 (A) >60 mL/min/1.73 m^2 Final     eGFR if non    Date Value Ref Range Status   03/06/2019 48 (A) >60 mL/min/1.73 m^2 Final     Comment:     Calculation used to obtain the estimated glomerular filtration  rate (eGFR) is the CKD-EPI equation.        CrCl cannot be calculated (Patient's most recent lab result is older than the maximum 7 days allowed.).    Assessment:     1. Paroxysmal atrial fibrillation    2. Cardiac pacemaker in situ    3. Coronary artery disease : multiple vessels, s/p PTCA    4. Essential hypertension    5. Mixed hyperlipidemia    BP controlled on current medical management  No CNS complaints to suggest TIA or CVA    Plan:   Paroxysmal atrial fibrillation  Per Dr. Martinez, given her single 7 hr episode of A-fib within an 11 month time frame, patient didn't need to be anticoagulated at that time. Recommendation made to monitor her A-fib burden and readdress OAC at that time  Continue ASA, Plavix and metoprolol     Cardiac pacemaker in situ  Continue device clinic     Coronary artery disease : multiple vessels, s/p PTCA  Continue ASA, Statin, Imdur, BB  Heart healthy diet  Maintain active lifestyle     Essential hypertension  Continue metoprolol and Losartan     Mixed hyperlipidemia  Continue Statin      RTC in 6 months or sooner with CMP , lipids before visit

## 2019-05-14 ENCOUNTER — OFFICE VISIT (OUTPATIENT)
Dept: CARDIOLOGY | Facility: CLINIC | Age: 79
End: 2019-05-14
Payer: MEDICARE

## 2019-05-14 VITALS
HEART RATE: 76 BPM | DIASTOLIC BLOOD PRESSURE: 76 MMHG | BODY MASS INDEX: 25.03 KG/M2 | HEIGHT: 64 IN | SYSTOLIC BLOOD PRESSURE: 114 MMHG | WEIGHT: 146.63 LBS

## 2019-05-14 DIAGNOSIS — I10 ESSENTIAL HYPERTENSION: Chronic | ICD-10-CM

## 2019-05-14 DIAGNOSIS — Z95.0 CARDIAC PACEMAKER IN SITU: ICD-10-CM

## 2019-05-14 DIAGNOSIS — I25.10 CORONARY ARTERY DISEASE INVOLVING NATIVE CORONARY ARTERY OF NATIVE HEART WITHOUT ANGINA PECTORIS: Chronic | ICD-10-CM

## 2019-05-14 DIAGNOSIS — E78.2 MIXED HYPERLIPIDEMIA: Chronic | ICD-10-CM

## 2019-05-14 DIAGNOSIS — I48.0 PAROXYSMAL ATRIAL FIBRILLATION: Primary | Chronic | ICD-10-CM

## 2019-05-14 PROCEDURE — 99214 PR OFFICE/OUTPT VISIT, EST, LEVL IV, 30-39 MIN: ICD-10-PCS | Mod: HCNC,S$GLB,, | Performed by: NURSE PRACTITIONER

## 2019-05-14 PROCEDURE — 99999 PR PBB SHADOW E&M-EST. PATIENT-LVL III: ICD-10-PCS | Mod: PBBFAC,HCNC,, | Performed by: NURSE PRACTITIONER

## 2019-05-14 PROCEDURE — 99499 UNLISTED E&M SERVICE: CPT | Mod: HCNC,S$GLB,, | Performed by: NURSE PRACTITIONER

## 2019-05-14 PROCEDURE — 99999 PR PBB SHADOW E&M-EST. PATIENT-LVL III: CPT | Mod: PBBFAC,HCNC,, | Performed by: NURSE PRACTITIONER

## 2019-05-14 PROCEDURE — 1101F PT FALLS ASSESS-DOCD LE1/YR: CPT | Mod: HCNC,CPTII,S$GLB, | Performed by: NURSE PRACTITIONER

## 2019-05-14 PROCEDURE — 3078F DIAST BP <80 MM HG: CPT | Mod: HCNC,CPTII,S$GLB, | Performed by: NURSE PRACTITIONER

## 2019-05-14 PROCEDURE — 99214 OFFICE O/P EST MOD 30 MIN: CPT | Mod: HCNC,S$GLB,, | Performed by: NURSE PRACTITIONER

## 2019-05-14 PROCEDURE — 99499 RISK ADDL DX/OHS AUDIT: ICD-10-PCS | Mod: HCNC,S$GLB,, | Performed by: NURSE PRACTITIONER

## 2019-05-14 PROCEDURE — 3074F SYST BP LT 130 MM HG: CPT | Mod: HCNC,CPTII,S$GLB, | Performed by: NURSE PRACTITIONER

## 2019-05-14 PROCEDURE — 3074F PR MOST RECENT SYSTOLIC BLOOD PRESSURE < 130 MM HG: ICD-10-PCS | Mod: HCNC,CPTII,S$GLB, | Performed by: NURSE PRACTITIONER

## 2019-05-14 PROCEDURE — 1101F PR PT FALLS ASSESS DOC 0-1 FALLS W/OUT INJ PAST YR: ICD-10-PCS | Mod: HCNC,CPTII,S$GLB, | Performed by: NURSE PRACTITIONER

## 2019-05-14 PROCEDURE — 3078F PR MOST RECENT DIASTOLIC BLOOD PRESSURE < 80 MM HG: ICD-10-PCS | Mod: HCNC,CPTII,S$GLB, | Performed by: NURSE PRACTITIONER

## 2019-05-15 ENCOUNTER — OFFICE VISIT (OUTPATIENT)
Dept: GASTROENTEROLOGY | Facility: CLINIC | Age: 79
End: 2019-05-15
Payer: MEDICARE

## 2019-05-15 VITALS
SYSTOLIC BLOOD PRESSURE: 108 MMHG | HEART RATE: 72 BPM | DIASTOLIC BLOOD PRESSURE: 60 MMHG | HEIGHT: 64 IN | WEIGHT: 149.94 LBS | BODY MASS INDEX: 25.6 KG/M2

## 2019-05-15 DIAGNOSIS — R79.89 ABNORMAL LFTS: Primary | ICD-10-CM

## 2019-05-15 PROCEDURE — 3078F PR MOST RECENT DIASTOLIC BLOOD PRESSURE < 80 MM HG: ICD-10-PCS | Mod: HCNC,CPTII,S$GLB, | Performed by: INTERNAL MEDICINE

## 2019-05-15 PROCEDURE — 1101F PT FALLS ASSESS-DOCD LE1/YR: CPT | Mod: HCNC,CPTII,S$GLB, | Performed by: INTERNAL MEDICINE

## 2019-05-15 PROCEDURE — 99999 PR PBB SHADOW E&M-EST. PATIENT-LVL III: ICD-10-PCS | Mod: PBBFAC,HCNC,, | Performed by: INTERNAL MEDICINE

## 2019-05-15 PROCEDURE — 3074F PR MOST RECENT SYSTOLIC BLOOD PRESSURE < 130 MM HG: ICD-10-PCS | Mod: HCNC,CPTII,S$GLB, | Performed by: INTERNAL MEDICINE

## 2019-05-15 PROCEDURE — 3078F DIAST BP <80 MM HG: CPT | Mod: HCNC,CPTII,S$GLB, | Performed by: INTERNAL MEDICINE

## 2019-05-15 PROCEDURE — 3074F SYST BP LT 130 MM HG: CPT | Mod: HCNC,CPTII,S$GLB, | Performed by: INTERNAL MEDICINE

## 2019-05-15 PROCEDURE — 99999 PR PBB SHADOW E&M-EST. PATIENT-LVL III: CPT | Mod: PBBFAC,HCNC,, | Performed by: INTERNAL MEDICINE

## 2019-05-15 PROCEDURE — 99213 OFFICE O/P EST LOW 20 MIN: CPT | Mod: HCNC,S$GLB,, | Performed by: INTERNAL MEDICINE

## 2019-05-15 PROCEDURE — 1101F PR PT FALLS ASSESS DOC 0-1 FALLS W/OUT INJ PAST YR: ICD-10-PCS | Mod: HCNC,CPTII,S$GLB, | Performed by: INTERNAL MEDICINE

## 2019-05-15 PROCEDURE — 99213 PR OFFICE/OUTPT VISIT, EST, LEVL III, 20-29 MIN: ICD-10-PCS | Mod: HCNC,S$GLB,, | Performed by: INTERNAL MEDICINE

## 2019-05-15 NOTE — PROGRESS NOTES
Subjective:     Violet Swenson is here for follow up of Abnormal LFT      HPI  Since Violet Swenson's last visit she has overall been doing well.  She denies any acute issues.    No evidence of liver decompensation: no ascites, confusion or GI bleeding.      Path 11/2018    FINAL PATHOLOGIC DIAGNOSIS  Liver, random, biopsy:  - Minimal nonspecific portal inflammation with macrophages present.  - Mild zone 3 sinusoidal dilatation.  - Sinusoidal lymphocytes, hematopathology consult pending, results will be issued in an addendum.  - Fibrous expansion of portal tracts, stage 1 (of 4).  - Ductular reactioin and ductular metaplasia of periportal hepatocytes.    Review of Systems    Objective:     Physical Exam   Constitutional: She is oriented to person, place, and time. She appears well-developed and well-nourished. No distress.   HENT:   Head: Normocephalic and atraumatic.   Mouth/Throat: Oropharynx is clear and moist. No oropharyngeal exudate.   Eyes: Pupils are equal, round, and reactive to light. Conjunctivae are normal. Right eye exhibits no discharge. Left eye exhibits no discharge. No scleral icterus.   Pulmonary/Chest: Effort normal and breath sounds normal. No respiratory distress. She has no wheezes.   Abdominal: Soft. She exhibits no distension. There is no tenderness.   Musculoskeletal: She exhibits no edema.   Neurological: She is alert and oriented to person, place, and time.   Psychiatric: She has a normal mood and affect. Her behavior is normal.   Vitals reviewed.      Computed MELD-Na score unavailable. Necessary lab results were not found in the last year.  Computed MELD score unavailable. Necessary lab results were not found in the last year.    WBC   Date Value Ref Range Status   02/06/2019 10.14 3.90 - 12.70 K/uL Final     Hemoglobin   Date Value Ref Range Status   02/06/2019 11.4 (L) 12.0 - 16.0 g/dL Final     POC Hematocrit   Date Value Ref Range Status   04/07/2017 35 (L) 36  - 54 %PCV Final     Hematocrit   Date Value Ref Range Status   02/06/2019 36.0 (L) 37.0 - 48.5 % Final     Platelets   Date Value Ref Range Status   02/06/2019 234 150 - 350 K/uL Final     BUN, Bld   Date Value Ref Range Status   03/06/2019 28 (H) 8 - 23 mg/dL Final     Creatinine   Date Value Ref Range Status   03/06/2019 1.1 0.5 - 1.4 mg/dL Final     Glucose   Date Value Ref Range Status   03/06/2019 51 (L) 70 - 110 mg/dL Final     Calcium   Date Value Ref Range Status   03/06/2019 10.1 8.7 - 10.5 mg/dL Final     Sodium   Date Value Ref Range Status   03/06/2019 144 136 - 145 mmol/L Final     Potassium   Date Value Ref Range Status   03/06/2019 4.9 3.5 - 5.1 mmol/L Final     Chloride   Date Value Ref Range Status   03/06/2019 116 (H) 95 - 110 mmol/L Final     Magnesium   Date Value Ref Range Status   04/09/2017 1.8 1.6 - 2.6 mg/dL Final     AST   Date Value Ref Range Status   03/06/2019 43 (H) 10 - 40 U/L Final     ALT   Date Value Ref Range Status   03/06/2019 34 10 - 44 U/L Final     Alkaline Phosphatase   Date Value Ref Range Status   03/06/2019 86 55 - 135 U/L Final     Total Bilirubin   Date Value Ref Range Status   03/06/2019 0.3 0.1 - 1.0 mg/dL Final     Comment:     For infants and newborns, interpretation of results should be based  on gestational age, weight and in agreement with clinical  observations.  Premature Infant recommended reference ranges:  Up to 24 hours.............<8.0 mg/dL  Up to 48 hours............<12.0 mg/dL  3-5 days..................<15.0 mg/dL  6-29 days.................<15.0 mg/dL       Albumin   Date Value Ref Range Status   03/06/2019 3.3 (L) 3.5 - 5.2 g/dL Final     INR   Date Value Ref Range Status   11/30/2018 1.0 0.8 - 1.2 Final     Comment:     Coumadin Therapy:  2.0 - 3.0 for INR for all indicators except mechanical heart valves  and antiphospholipid syndromes which should use 2.5 - 3.5.           Assessment/Plan:     1. Abnormal LFTs      Violet Swenson is a 79  y.o. female withAbnormal LFT    Abnormal LFT- ALT has now normalized.  Biopsy was also overall unremarkable and no significant liver disease or fibrosis identified.  Suspect the changes in her abnormal LFTs are probably related to previous medications.  No concern for significant chronic underlying, progressive liver disease.  -provided patient with reassurance and that no additional follow-up is needed unless there is a significant change    Return to clinic as need    Dahiana Morales MD

## 2019-05-20 ENCOUNTER — LAB VISIT (OUTPATIENT)
Dept: LAB | Facility: HOSPITAL | Age: 79
End: 2019-05-20
Payer: MEDICARE

## 2019-05-20 DIAGNOSIS — D64.9 CHRONIC ANEMIA: Chronic | ICD-10-CM

## 2019-05-20 DIAGNOSIS — G62.0 CHEMOTHERAPY-INDUCED NEUROPATHY: Chronic | ICD-10-CM

## 2019-05-20 DIAGNOSIS — T45.1X5A CHEMOTHERAPY-INDUCED NEUROPATHY: Chronic | ICD-10-CM

## 2019-05-20 DIAGNOSIS — C85.83 LARGE CELL LYMPHOMA OF INTRA-ABDOMINAL LYMPH NODES: Chronic | ICD-10-CM

## 2019-05-20 LAB
FERRITIN SERPL-MCNC: 66 NG/ML (ref 20–300)
FERRITIN SERPL-MCNC: 66 NG/ML (ref 20–300)
IRON SERPL-MCNC: 79 UG/DL (ref 30–160)
IRON SERPL-MCNC: 79 UG/DL (ref 30–160)
SATURATED IRON: 18 % (ref 20–50)
SATURATED IRON: 18 % (ref 20–50)
TOTAL IRON BINDING CAPACITY: 438 UG/DL (ref 250–450)
TOTAL IRON BINDING CAPACITY: 438 UG/DL (ref 250–450)
TRANSFERRIN SERPL-MCNC: 296 MG/DL (ref 200–375)
TRANSFERRIN SERPL-MCNC: 296 MG/DL (ref 200–375)

## 2019-05-20 PROCEDURE — 83540 ASSAY OF IRON: CPT | Mod: HCNC

## 2019-05-20 PROCEDURE — 36415 COLL VENOUS BLD VENIPUNCTURE: CPT | Mod: HCNC

## 2019-05-20 PROCEDURE — 82728 ASSAY OF FERRITIN: CPT | Mod: HCNC

## 2019-05-22 ENCOUNTER — OFFICE VISIT (OUTPATIENT)
Dept: HEMATOLOGY/ONCOLOGY | Facility: CLINIC | Age: 79
End: 2019-05-22
Payer: MEDICARE

## 2019-05-22 ENCOUNTER — LAB VISIT (OUTPATIENT)
Dept: LAB | Facility: HOSPITAL | Age: 79
End: 2019-05-22
Attending: INTERNAL MEDICINE
Payer: MEDICARE

## 2019-05-22 VITALS
HEIGHT: 64 IN | SYSTOLIC BLOOD PRESSURE: 124 MMHG | WEIGHT: 144.38 LBS | DIASTOLIC BLOOD PRESSURE: 86 MMHG | TEMPERATURE: 98 F | OXYGEN SATURATION: 99 % | BODY MASS INDEX: 24.65 KG/M2 | HEART RATE: 89 BPM | RESPIRATION RATE: 16 BRPM

## 2019-05-22 DIAGNOSIS — C85.83 LARGE CELL LYMPHOMA OF INTRA-ABDOMINAL LYMPH NODES: Chronic | ICD-10-CM

## 2019-05-22 DIAGNOSIS — C85.83 LARGE CELL LYMPHOMA OF INTRA-ABDOMINAL LYMPH NODES: Primary | Chronic | ICD-10-CM

## 2019-05-22 DIAGNOSIS — D53.9 MACROCYTIC ANEMIA: ICD-10-CM

## 2019-05-22 LAB
ALBUMIN SERPL BCP-MCNC: 3.9 G/DL (ref 3.5–5.2)
ALP SERPL-CCNC: 60 U/L (ref 55–135)
ALT SERPL W/O P-5'-P-CCNC: 30 U/L (ref 10–44)
ANION GAP SERPL CALC-SCNC: 8 MMOL/L (ref 8–16)
AST SERPL-CCNC: 38 U/L (ref 10–40)
BASOPHILS # BLD AUTO: 0.02 K/UL (ref 0–0.2)
BASOPHILS NFR BLD: 0.2 % (ref 0–1.9)
BILIRUB SERPL-MCNC: 0.4 MG/DL (ref 0.1–1)
BUN SERPL-MCNC: 23 MG/DL (ref 8–23)
CALCIUM SERPL-MCNC: 9.4 MG/DL (ref 8.7–10.5)
CHLORIDE SERPL-SCNC: 110 MMOL/L (ref 95–110)
CO2 SERPL-SCNC: 25 MMOL/L (ref 23–29)
CREAT SERPL-MCNC: 1.5 MG/DL (ref 0.5–1.4)
DIFFERENTIAL METHOD: ABNORMAL
EOSINOPHIL # BLD AUTO: 0.2 K/UL (ref 0–0.5)
EOSINOPHIL NFR BLD: 1.6 % (ref 0–8)
ERYTHROCYTE [DISTWIDTH] IN BLOOD BY AUTOMATED COUNT: 16.8 % (ref 11.5–14.5)
EST. GFR  (AFRICAN AMERICAN): 38 ML/MIN/1.73 M^2
EST. GFR  (NON AFRICAN AMERICAN): 33 ML/MIN/1.73 M^2
GLUCOSE SERPL-MCNC: 60 MG/DL (ref 70–110)
HCT VFR BLD AUTO: 34.6 % (ref 37–48.5)
HGB BLD-MCNC: 11.3 G/DL (ref 12–16)
LYMPHOCYTES # BLD AUTO: 3.5 K/UL (ref 1–4.8)
LYMPHOCYTES NFR BLD: 28.4 % (ref 18–48)
MCH RBC QN AUTO: 33.6 PG (ref 27–31)
MCHC RBC AUTO-ENTMCNC: 32.7 G/DL (ref 32–36)
MCV RBC AUTO: 103 FL (ref 82–98)
MONOCYTES # BLD AUTO: 0.9 K/UL (ref 0.3–1)
MONOCYTES NFR BLD: 7.2 % (ref 4–15)
NEUTROPHILS # BLD AUTO: 7.7 K/UL (ref 1.8–7.7)
NEUTROPHILS NFR BLD: 62.6 % (ref 38–73)
PLATELET # BLD AUTO: 192 K/UL (ref 150–350)
PMV BLD AUTO: 10.8 FL (ref 9.2–12.9)
POTASSIUM SERPL-SCNC: 4.1 MMOL/L (ref 3.5–5.1)
PROT SERPL-MCNC: 6.9 G/DL (ref 6–8.4)
RBC # BLD AUTO: 3.36 M/UL (ref 4–5.4)
SODIUM SERPL-SCNC: 143 MMOL/L (ref 136–145)
WBC # BLD AUTO: 12.22 K/UL (ref 3.9–12.7)

## 2019-05-22 PROCEDURE — 85025 COMPLETE CBC W/AUTO DIFF WBC: CPT | Mod: HCNC

## 2019-05-22 PROCEDURE — 3074F SYST BP LT 130 MM HG: CPT | Mod: HCNC,CPTII,S$GLB, | Performed by: INTERNAL MEDICINE

## 2019-05-22 PROCEDURE — 99499 RISK ADDL DX/OHS AUDIT: ICD-10-PCS | Mod: HCNC,S$GLB,, | Performed by: INTERNAL MEDICINE

## 2019-05-22 PROCEDURE — 1101F PT FALLS ASSESS-DOCD LE1/YR: CPT | Mod: HCNC,CPTII,S$GLB, | Performed by: INTERNAL MEDICINE

## 2019-05-22 PROCEDURE — 36415 COLL VENOUS BLD VENIPUNCTURE: CPT | Mod: HCNC

## 2019-05-22 PROCEDURE — 99499 UNLISTED E&M SERVICE: CPT | Mod: HCNC,S$GLB,, | Performed by: INTERNAL MEDICINE

## 2019-05-22 PROCEDURE — 80053 COMPREHEN METABOLIC PANEL: CPT | Mod: HCNC

## 2019-05-22 PROCEDURE — 1101F PR PT FALLS ASSESS DOC 0-1 FALLS W/OUT INJ PAST YR: ICD-10-PCS | Mod: HCNC,CPTII,S$GLB, | Performed by: INTERNAL MEDICINE

## 2019-05-22 PROCEDURE — 3074F PR MOST RECENT SYSTOLIC BLOOD PRESSURE < 130 MM HG: ICD-10-PCS | Mod: HCNC,CPTII,S$GLB, | Performed by: INTERNAL MEDICINE

## 2019-05-22 PROCEDURE — 3079F DIAST BP 80-89 MM HG: CPT | Mod: HCNC,CPTII,S$GLB, | Performed by: INTERNAL MEDICINE

## 2019-05-22 PROCEDURE — 99999 PR PBB SHADOW E&M-EST. PATIENT-LVL III: CPT | Mod: PBBFAC,HCNC,, | Performed by: INTERNAL MEDICINE

## 2019-05-22 PROCEDURE — 99999 PR PBB SHADOW E&M-EST. PATIENT-LVL III: ICD-10-PCS | Mod: PBBFAC,HCNC,, | Performed by: INTERNAL MEDICINE

## 2019-05-22 PROCEDURE — 99214 OFFICE O/P EST MOD 30 MIN: CPT | Mod: HCNC,S$GLB,, | Performed by: INTERNAL MEDICINE

## 2019-05-22 PROCEDURE — 3079F PR MOST RECENT DIASTOLIC BLOOD PRESSURE 80-89 MM HG: ICD-10-PCS | Mod: HCNC,CPTII,S$GLB, | Performed by: INTERNAL MEDICINE

## 2019-05-22 PROCEDURE — 99214 PR OFFICE/OUTPT VISIT, EST, LEVL IV, 30-39 MIN: ICD-10-PCS | Mod: HCNC,S$GLB,, | Performed by: INTERNAL MEDICINE

## 2019-05-22 NOTE — PROGRESS NOTES
Subjective:       Patient ID: Violet Swenson is a 79 y.o. female.    Chief Complaint: Lymphoma    HPI This 78 year old  lady  comes for follow up of her th diffuse large cell lymphoma.   A CT of   the abdomen done on 04/05/2017, done because of abdominal pain ,  was reported as showing a 6.9 x 7.0 x 8.4 cm soft  tissue mass in the right lower quadrant in the terminal ileum abutting the   cecum. There was adjacent conglomerate adenopathy in the right lower quadrant   mesentery.     The patient had a colonoscopy that showed a lesion in the terminal ileum.     She underwent a right hemicolectomy and terminal ileum removal. 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  She had a Ct/PET that showed evidence of surgery and some non specific findings, but no evidence of activer disease elsewhere.  An ECHO showed normal cardiac ejection fraction, as wella s a MUGA.  She was seen cardiology and cleared for ADRIAMYCIN administration.  She was negative for Hep B/C and HIV  Bone marrow was negative.     The patient started  R-CHOP. Ttreatmment  She tolerated the treatment with some minor difficulties. After 3 cycles, she had a repeat CT/PET  There was no activity in the abdomen.  There was development of ground glass opacities/infiltrates in both lungs which were hypermetabolic although the SUV was not reported.  We discussed the imaging studies with Radiology and Pulmonary.  Dr Corral of the Pulmonary Department favored the findings to be due to pulmonary congestion.  Her troponin was  normal, but her BNP was markedly elevated at 1,103.  She was started on Lasix by dr Corral and asked to have a  Ct in few weeks  The patient   had evaluation by Cardiology ( dr Gil).It was felt had developed CHF., with a decrease in her ejection traction to 47%., elevated BNP and imaging studies.  The patient indicated her leg  edema and SOB   improved on lasix. Repeat PET showed  clearance of all the infiltrates  Since, she has had a complete work including cardiac catheterization with negative findings.  A She comes for follow up. She had a CT/PET in 2018 that shows no evidence of recurrence. Repeat CT/PET 2018  And 2019 showed also no evidence of recurrence  During the last visit, while in the clinic, she had a near syncopal episode She got up, felt faint as if as if she was going to pass out. She needed assistance from my medical assistant personnel.  She tells me she has been having this episodes at home specially on standing   His BP was 93/60. I asked her to hold her BP medications for 3 days and take her BP at home. Her BP normalized and she restarted her BP medications.  Since then, she has had 3 similar episodes at home where she has fallen although she does not pass out. She describes shaking of the hands and weakness of legs preceding the falls.  ALLERGIES:see med card     MEDICATIONS: See MedCard.     PREVIOUS SURGERIES: Appendectomy in , tonsillectomy at age 19, gastric   bypass with incidental cholecystectomy, bilateral knee replacement in .     SOCIAL HISTORY: She is . She had two natural children, and one adopted   one. The adopted child has . She lives in [place] with her   . She smoked for two years, averaging a pack a day. She stopped 35   years ago or so. Denies any alcohol intake. She worked in accounting.     FAMILY HISTORY: Father  of colon cancer. Younger brother had leukemia that  transform into a lymphoma. Sister had pancreatic cancer             Review of Systems   Constitutional: Negative.         Frequent falls   HENT: Negative.    Eyes: Negative.    Respiratory: Negative.  Negative for cough and wheezing.    Cardiovascular: Negative.  Negative for chest pain.   Gastrointestinal: Negative.    Genitourinary: Negative.    Neurological: Negative.    Psychiatric/Behavioral: Negative.        Objective:      Physical Exam    Constitutional: She is oriented to person, place, and time. She appears well-developed. No distress.   HENT:   Head: Normocephalic.   Right Ear: Tympanic membrane, external ear and ear canal normal.   Left Ear: Tympanic membrane, external ear and ear canal normal.   Nose: Nose normal. Right sinus exhibits no maxillary sinus tenderness and no frontal sinus tenderness. Left sinus exhibits no maxillary sinus tenderness and no frontal sinus tenderness.   Mouth/Throat: Oropharynx is clear and moist and mucous membranes are normal.   Teeth normal.  Gums normal.   Eyes: Pupils are equal, round, and reactive to light. Conjunctivae and lids are normal.   Neck: Normal carotid pulses, no hepatojugular reflux and no JVD present. Carotid bruit is not present. No tracheal deviation present. No thyroid mass and no thyromegaly present.   Cardiovascular: Normal rate, regular rhythm, S1 normal, S2 normal, normal heart sounds and intact distal pulses. Exam reveals no gallop and no friction rub.   No murmur heard.  Carotid exam normal   Pulmonary/Chest: Effort normal and breath sounds normal. No accessory muscle usage. No respiratory distress. She has no wheezes. She has no rales. She exhibits no tenderness.   Abdominal: Soft. Normal appearance. She exhibits no distension and no mass. There is no splenomegaly or hepatomegaly. There is no tenderness. There is no rebound and no guarding.   Musculoskeletal: Normal range of motion. She exhibits no edema or tenderness.        Right hand: Normal.        Left hand: Normal.       Lymphadenopathy:     She has no cervical adenopathy.     She has no axillary adenopathy.        Right: No inguinal and no supraclavicular adenopathy present.        Left: No inguinal and no supraclavicular adenopathy present.   Neurological: She is alert and oriented to person, place, and time. She has normal strength. No cranial nerve deficit. Coordination normal.   Skin: Skin is warm and dry. No rash noted. She is  not diaphoretic. No cyanosis or erythema. No pallor. Nails show no clubbing.   Psychiatric: She has a normal mood and affect. Her behavior is normal. Judgment and thought content normal.       Wt Readings from Last 3 Encounters:   05/22/19 65.5 kg (144 lb 6.4 oz)   05/15/19 68 kg (149 lb 14.6 oz)   05/14/19 66.5 kg (146 lb 9.7 oz)     Temp Readings from Last 3 Encounters:   05/22/19 97.5 °F (36.4 °C)   02/06/19 97.5 °F (36.4 °C)   12/19/18 97 °F (36.1 °C) (Tympanic)     BP Readings from Last 3 Encounters:   05/22/19 124/86   05/15/19 108/60   05/14/19 114/76     Pulse Readings from Last 3 Encounters:   05/22/19 89   05/15/19 72   05/14/19 76       Assessment:       1. 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 Macrocytic anemia        Plan:     wl have a cbc and a cmp today. If stable, see me back in 3 months with a cbc/cmp and tibc/ferritin.  Needs to check with dr Gil regarding her falls. They seem similar to the episode she had during her visit to our office on 2/16/2016  She was asked to receive the new recombinant shingles vaccine

## 2019-05-24 ENCOUNTER — OFFICE VISIT (OUTPATIENT)
Dept: CARDIOLOGY | Facility: CLINIC | Age: 79
End: 2019-05-24
Payer: MEDICARE

## 2019-05-24 VITALS — HEART RATE: 72 BPM | DIASTOLIC BLOOD PRESSURE: 66 MMHG | SYSTOLIC BLOOD PRESSURE: 96 MMHG

## 2019-05-24 DIAGNOSIS — I25.5 ISCHEMIC CARDIOMYOPATHY: Chronic | ICD-10-CM

## 2019-05-24 DIAGNOSIS — I25.10 CORONARY ARTERY DISEASE INVOLVING NATIVE CORONARY ARTERY OF NATIVE HEART WITHOUT ANGINA PECTORIS: Chronic | ICD-10-CM

## 2019-05-24 DIAGNOSIS — G62.0 CHEMOTHERAPY-INDUCED NEUROPATHY: Chronic | ICD-10-CM

## 2019-05-24 DIAGNOSIS — E03.9 HYPOTHYROIDISM (ACQUIRED): Chronic | ICD-10-CM

## 2019-05-24 DIAGNOSIS — I95.1 POSTURAL HYPOTENSION: Primary | ICD-10-CM

## 2019-05-24 DIAGNOSIS — I70.0 CALCIFICATION OF AORTA: Chronic | ICD-10-CM

## 2019-05-24 DIAGNOSIS — E78.2 MIXED HYPERLIPIDEMIA: Chronic | ICD-10-CM

## 2019-05-24 DIAGNOSIS — I10 ESSENTIAL HYPERTENSION: Chronic | ICD-10-CM

## 2019-05-24 DIAGNOSIS — Z95.0 PACEMAKER: Chronic | ICD-10-CM

## 2019-05-24 DIAGNOSIS — Z98.84 S/P GASTRIC BYPASS: Chronic | ICD-10-CM

## 2019-05-24 DIAGNOSIS — Z86.79 HISTORY OF CONGESTIVE HEART FAILURE: ICD-10-CM

## 2019-05-24 DIAGNOSIS — I48.0 PAROXYSMAL ATRIAL FIBRILLATION: Chronic | ICD-10-CM

## 2019-05-24 DIAGNOSIS — C85.83 LARGE CELL LYMPHOMA OF INTRA-ABDOMINAL LYMPH NODES: Chronic | ICD-10-CM

## 2019-05-24 DIAGNOSIS — M79.2 NEUROPATHIC PAIN OF RIGHT FOOT: ICD-10-CM

## 2019-05-24 DIAGNOSIS — M1A.09X0 IDIOPATHIC CHRONIC GOUT OF MULTIPLE SITES WITHOUT TOPHUS: Chronic | ICD-10-CM

## 2019-05-24 DIAGNOSIS — C83.32 DIFFUSE LARGE B-CELL LYMPHOMA OF INTRATHORACIC LYMPH NODES: ICD-10-CM

## 2019-05-24 DIAGNOSIS — T45.1X5A CHEMOTHERAPY-INDUCED NEUROPATHY: Chronic | ICD-10-CM

## 2019-05-24 DIAGNOSIS — N18.30 STAGE 3 CHRONIC KIDNEY DISEASE: Chronic | ICD-10-CM

## 2019-05-24 DIAGNOSIS — E55.9 VITAMIN D DEFICIENCY: ICD-10-CM

## 2019-05-24 DIAGNOSIS — Z96.653 S/P TKR (TOTAL KNEE REPLACEMENT), BILATERAL: Chronic | ICD-10-CM

## 2019-05-24 PROCEDURE — 3078F DIAST BP <80 MM HG: CPT | Mod: HCNC,CPTII,S$GLB, | Performed by: INTERNAL MEDICINE

## 2019-05-24 PROCEDURE — 3078F PR MOST RECENT DIASTOLIC BLOOD PRESSURE < 80 MM HG: ICD-10-PCS | Mod: HCNC,CPTII,S$GLB, | Performed by: INTERNAL MEDICINE

## 2019-05-24 PROCEDURE — 99999 PR PBB SHADOW E&M-EST. PATIENT-LVL III: CPT | Mod: PBBFAC,HCNC,, | Performed by: INTERNAL MEDICINE

## 2019-05-24 PROCEDURE — 99999 PR PBB SHADOW E&M-EST. PATIENT-LVL III: ICD-10-PCS | Mod: PBBFAC,HCNC,, | Performed by: INTERNAL MEDICINE

## 2019-05-24 PROCEDURE — 99214 OFFICE O/P EST MOD 30 MIN: CPT | Mod: HCNC,S$GLB,, | Performed by: INTERNAL MEDICINE

## 2019-05-24 PROCEDURE — 1101F PR PT FALLS ASSESS DOC 0-1 FALLS W/OUT INJ PAST YR: ICD-10-PCS | Mod: HCNC,CPTII,S$GLB, | Performed by: INTERNAL MEDICINE

## 2019-05-24 PROCEDURE — 3074F PR MOST RECENT SYSTOLIC BLOOD PRESSURE < 130 MM HG: ICD-10-PCS | Mod: HCNC,CPTII,S$GLB, | Performed by: INTERNAL MEDICINE

## 2019-05-24 PROCEDURE — 3074F SYST BP LT 130 MM HG: CPT | Mod: HCNC,CPTII,S$GLB, | Performed by: INTERNAL MEDICINE

## 2019-05-24 PROCEDURE — 99214 PR OFFICE/OUTPT VISIT, EST, LEVL IV, 30-39 MIN: ICD-10-PCS | Mod: HCNC,S$GLB,, | Performed by: INTERNAL MEDICINE

## 2019-05-24 PROCEDURE — 1101F PT FALLS ASSESS-DOCD LE1/YR: CPT | Mod: HCNC,CPTII,S$GLB, | Performed by: INTERNAL MEDICINE

## 2019-05-24 NOTE — PROGRESS NOTES
Subjective:   Patient ID:  Violet Swenson is a 79 y.o. female who presents for follow up of Dizziness and Coronary Artery Disease      HPI  A 78 yo female with htn pacer cad s/p multivessels tents lymphoma signioficant weight loss due to nausea vomiting and decrease appetite   paf is her efor f/u she ahs been having significant orthostasi leading to multiple episodes of syncope.  Her bp drop from resting 106 to 72 mmhg standing with increase in hr from 76 to 91/min. She has no chest pain shortness ogf breath fast heart beat. ahs no other issues clinically. Has heaviness in her chest when her bp drops. She gets heaviness in chest when she walks.no chf symptoms. Has no leg swelling .she sleeps w/o any cardiac symptoms. Uses a sleeping pill.  Past Medical History:   Diagnosis Date    Age-related osteoporosis without current pathological fracture 8/20/2018    Anemia     Anxiety     Arthritis     Atrial flutter     Cancer     lymphoma Large cell B    CHF (congestive heart failure)     Chronic anemia 4/26/2017    Chronic midline low back pain with right-sided sciatica 8/20/2018    Coronary artery disease     01/2015 LHC patent LCX. 50% stenosis in LAD and RCA.      Depression     Disorder of kidney and ureter     Encounter for blood transfusion     GERD (gastroesophageal reflux disease)     Gout, arthritis     Heart failure     Hx of psychiatric care     Hyperlipidemia     Hypertension     Hypothyroidism     Immune deficiency disorder     Kidney disease     Lung nodule 2014    RML--stable    Obesity     Pacemaker     Metronic    Paroxysmal atrial fibrillation 3/15/2018    Pneumonia     Polyneuropathy     chemo induced    Psychiatric problem     Tobacco dependence     quit 1976    Trouble in sleeping        Past Surgical History:   Procedure Laterality Date    APPENDECTOMY  1966 approx    BIOPSY-BONE MARROW N/A 5/15/2017    [facility]     "CARDIAC PACEMAKER PLACEMENT  2015    CHOLECYSTECTOMY  1993    incidental at time of gastric bypass    COLECTOMY-PARTIAL N/A 2017    [facility]    COLON SURGERY Right 2017    hemicolectomy    COLONOSCOPY N/A 2018    Per[facility] Saúl Arthur III, [facility] ENDO    COLONOSCOPY N/A 2017    [facility]ye [facility]amorado [facility] at [facility] ENDO    CORONARY ANGIOPLASTY  2014    CORONARY STENT PLACEMENT  2014    EGD (ESOPHAGOGASTRODUODENOSCOPY) N/A 2018    Per[facility] ENDO    EXPLORATORY-LAPAROTOMY N/A 2017    [facility] OR    GASTRIC BYPASS      with incidental choly    HEART CATH-BILATERAL N/A 2017    Performed [facility] Nader Gil [facility] at [facility] CATH LAB    HERNIA REPAIR      QIYRHWAIU-BUSK-D-CATH N/A 5/15/2017    [facility] OR    JOINT REPLACEMENT Bilateral 2009    3 months apart    LYSIS-ADHESION N/A 2017    [facility] OR    REMOVAL-PORT-A-CATH N/A 2018    Performed [facility] Nima Abdullahi [facility] at [facility] CATH LAB    TONSILLECTOMY         Social History     Tobacco Use    Smoking status: Former Smoker     Packs/day: 2.00     Years: 6.00     Pack years: 12.00     Last attempt to quit: 3/15/1976     Years since quittin.2    Smokeless tobacco: Never Used   Substance Use Topics    Alcohol use: No     Alcohol/week: 0.0 oz    Drug use: No       Family History   Problem Relation Age of Onset    Heart disease Mother     Hypertension Mother     Cataracts Mother     Stomach cancer Father         "ulcers that turned to cancer"    Cancer Father         stomach    Pancreatic cancer Sister     Cancer Sister         pancreatic    Leukemia Brother         "leukemia which led to intestinal cancer"    Cataracts Brother     Cancer Brother         leukemia then later stomach cancer    Drug abuse Son     Cancer Son      "    prostate cancer    Prostate cancer Son     COPD Daughter     Asthma Daughter     Hypertension Maternal Grandfather     Stroke Maternal Grandfather     Alcohol abuse Neg Hx     Diabetes Neg Hx     Mental retardation Neg Hx     Mental illness Neg Hx        Current Outpatient Medications   Medication Sig    allopurinol (ZYLOPRIM) 300 MG tablet TAKE ONE TABLET BY MOUTH EVERY DAY    ALPRAZolam (XANAX) 0.25 MG tablet TAKE ONE TABLET BY MOUTH TWICE DAILY AS NEEDED FOR ANXIETY    ascorbic acid, vitamin C, (VITAMIN C) 100 MG tablet Take by mouth once daily.    aspirin (ECOTRIN) 81 MG EC tablet Take 81 mg by mouth nightly.     calcitRIOL (ROCALTROL) 0.25 MCG Cap Take 1 capsule (0.25 mcg total) by mouth every Mon, Wed, Fri.    clopidogrel (PLAVIX) 75 mg tablet TAKE ONE TABLET BY MOUTH EVERY DAY    COLCRYS 0.6 mg tablet TAKE ONE TABLET BY MOUTH EVERY DAY    diclofenac sodium 1 % Gel Apply 2 g topically once daily. Apply 2 g over painful joints once or twice a day.    ergocalciferol (ERGOCALCIFEROL) 50,000 unit Cap Take 1 capsule (50,000 Units total) by mouth every 7 days.    fluticasone (FLONASE) 50 mcg/actuation nasal spray 2 sprays by Each Nare route once daily. (Patient taking differently: 2 sprays by Each Nare route daily as needed. )    furosemide (LASIX) 40 MG tablet Take 40 mg by mouth. Take 1 tablet (40mg total) by mouth every Monday,Wednesday and Friday once daily.    gabapentin (NEURONTIN) 100 MG capsule TAKE TWO CAPSULES BY MOUTH THREE TIMES DAILY    iron-vitamin C 100-250 mg, ICAR-C, 100-250 mg Tab TAKE ONE TABLET BY MOUTH EVERY DAY    isosorbide mononitrate (IMDUR) 30 MG 24 hr tablet Take 30 mg by mouth nightly.    levothyroxine (SYNTHROID) 75 MCG tablet TAKE ONE TABLET BY MOUTH EVERY DAY BEFORE BREAKFAST    loratadine (CLARITIN) 10 mg tablet Take 1 tablet (10 mg total) by mouth once daily.    losartan (COZAAR) 25 MG tablet Take 1 tablet (25 mg total) by mouth once daily.    meloxicam  (MOBIC) 7.5 MG tablet TAKE ONE TABLET BY MOUTH EVERY DAY AS NEEDED FOR PAIN    metoprolol tartrate (LOPRESSOR) 25 MG tablet TAKE ONE TABLET BY MOUTH TWICE DAILY    mirtazapine (REMERON SOL-TAB) 15 MG disintegrating tablet DISSOLVE ONE TABLET BY MOUTH NIGHTLY    multivitamin (ONE DAILY MULTIVITAMIN) per tablet Take 1 tablet by mouth once daily.    pravastatin (PRAVACHOL) 40 MG tablet Take 1 tablet (40 mg total) by mouth once daily.    ranitidine (ZANTAC) 150 MG capsule Take 150 mg by mouth nightly.     sertraline (ZOLOFT) 100 MG tablet TAKE 1 AND 1/2 TABLETS BY MOUTH DAILY    sodium bicarbonate 650 MG tablet TAKE ONE TABLET BY MOUTH TWICE DAILY    traMADol (ULTRAM) 50 mg tablet Take 1 tablet (50 mg total) by mouth every 12 (twelve) hours as needed for Pain.    ZINC ACETATE ORAL Take 250 mg by mouth once daily.      Current Facility-Administered Medications   Medication    denosumab (PROLIA) injection 60 mg     Current Outpatient Medications on File Prior to Visit   Medication Sig    allopurinol (ZYLOPRIM) 300 MG tablet TAKE ONE TABLET BY MOUTH EVERY DAY    ALPRAZolam (XANAX) 0.25 MG tablet TAKE ONE TABLET BY MOUTH TWICE DAILY AS NEEDED FOR ANXIETY    ascorbic acid, vitamin C, (VITAMIN C) 100 MG tablet Take by mouth once daily.    aspirin (ECOTRIN) 81 MG EC tablet Take 81 mg by mouth nightly.     calcitRIOL (ROCALTROL) 0.25 MCG Cap Take 1 capsule (0.25 mcg total) by mouth every Mon, Wed, Fri.    clopidogrel (PLAVIX) 75 mg tablet TAKE ONE TABLET BY MOUTH EVERY DAY    COLCRYS 0.6 mg tablet TAKE ONE TABLET BY MOUTH EVERY DAY    diclofenac sodium 1 % Gel Apply 2 g topically once daily. Apply 2 g over painful joints once or twice a day.    ergocalciferol (ERGOCALCIFEROL) 50,000 unit Cap Take 1 capsule (50,000 Units total) by mouth every 7 days.    fluticasone (FLONASE) 50 mcg/actuation nasal spray 2 sprays by Each Nare route once daily. (Patient taking differently: 2 sprays by Each Nare route daily as  needed. )    furosemide (LASIX) 40 MG tablet Take 40 mg by mouth. Take 1 tablet (40mg total) by mouth every Monday,Wednesday and Friday once daily.    gabapentin (NEURONTIN) 100 MG capsule TAKE TWO CAPSULES BY MOUTH THREE TIMES DAILY    iron-vitamin C 100-250 mg, ICAR-C, 100-250 mg Tab TAKE ONE TABLET BY MOUTH EVERY DAY    isosorbide mononitrate (IMDUR) 30 MG 24 hr tablet Take 30 mg by mouth nightly.    levothyroxine (SYNTHROID) 75 MCG tablet TAKE ONE TABLET BY MOUTH EVERY DAY BEFORE BREAKFAST    loratadine (CLARITIN) 10 mg tablet Take 1 tablet (10 mg total) by mouth once daily.    losartan (COZAAR) 25 MG tablet Take 1 tablet (25 mg total) by mouth once daily.    meloxicam (MOBIC) 7.5 MG tablet TAKE ONE TABLET BY MOUTH EVERY DAY AS NEEDED FOR PAIN    metoprolol tartrate (LOPRESSOR) 25 MG tablet TAKE ONE TABLET BY MOUTH TWICE DAILY    mirtazapine (REMERON SOL-TAB) 15 MG disintegrating tablet DISSOLVE ONE TABLET BY MOUTH NIGHTLY    multivitamin (ONE DAILY MULTIVITAMIN) per tablet Take 1 tablet by mouth once daily.    pravastatin (PRAVACHOL) 40 MG tablet Take 1 tablet (40 mg total) by mouth once daily.    ranitidine (ZANTAC) 150 MG capsule Take 150 mg by mouth nightly.     sertraline (ZOLOFT) 100 MG tablet TAKE 1 AND 1/2 TABLETS BY MOUTH DAILY    sodium bicarbonate 650 MG tablet TAKE ONE TABLET BY MOUTH TWICE DAILY    traMADol (ULTRAM) 50 mg tablet Take 1 tablet (50 mg total) by mouth every 12 (twelve) hours as needed for Pain.    ZINC ACETATE ORAL Take 250 mg by mouth once daily.      Current Facility-Administered Medications on File Prior to Visit   Medication    denosumab (PROLIA) injection 60 mg     Review of patient's allergies indicates:   Allergen Reactions    Corticosteroids (glucocorticoids) Nausea Only and Other (See Comments)     Stomach pain, dizziness, headache    Oxycodone Other (See Comments)     Blood pressure dropped     Review of Systems   Constitution: Positive for weight loss.  Negative for diaphoresis, malaise/fatigue and weight gain.   HENT: Negative for hoarse voice.    Eyes: Negative for double vision and visual disturbance.   Cardiovascular: Positive for near-syncope and syncope. Negative for chest pain, claudication, cyanosis, dyspnea on exertion, irregular heartbeat, leg swelling, orthopnea, palpitations and paroxysmal nocturnal dyspnea.   Respiratory: Negative for cough, hemoptysis, shortness of breath and snoring.    Hematologic/Lymphatic: Negative for bleeding problem. Does not bruise/bleed easily.   Skin: Negative for color change and poor wound healing.   Musculoskeletal: Negative for muscle cramps, muscle weakness and myalgias.   Gastrointestinal: Positive for nausea and vomiting. Negative for bloating, abdominal pain, change in bowel habit, diarrhea, heartburn, hematemesis, hematochezia and melena.   Neurological: Negative for excessive daytime sleepiness, dizziness, headaches, light-headedness, loss of balance, numbness and weakness.   Psychiatric/Behavioral: Negative for memory loss. The patient does not have insomnia.    Allergic/Immunologic: Negative for hives.       Objective:   Physical Exam   Constitutional: She is oriented to person, place, and time. She appears well-developed and well-nourished. She does not appear ill. No distress.   HENT:   Head: Normocephalic and atraumatic.   Eyes: Pupils are equal, round, and reactive to light. EOM are normal. No scleral icterus.   Neck: Normal range of motion. Neck supple. Normal carotid pulses, no hepatojugular reflux and no JVD present. Carotid bruit is not present. No tracheal deviation present. No thyromegaly present.   Cardiovascular: Normal rate, regular rhythm, normal heart sounds, intact distal pulses and normal pulses. Exam reveals no gallop and no friction rub.   No murmur heard.  Pulmonary/Chest: Effort normal and breath sounds normal. No respiratory distress. She has no wheezes. She has no rhonchi. She has no rales.  She exhibits no tenderness.   Pacer site well healed.   Abdominal: Soft. Normal appearance, normal aorta and bowel sounds are normal. She exhibits no distension, no abdominal bruit, no ascites and no pulsatile midline mass. There is no hepatomegaly. There is no tenderness.   Musculoskeletal: She exhibits no edema.        Right shoulder: She exhibits no deformity.   Neurological: She is alert and oriented to person, place, and time. She has normal strength. No cranial nerve deficit. Coordination normal.   Skin: Skin is warm and dry. No rash noted. No cyanosis or erythema. Nails show no clubbing.   Psychiatric: She has a normal mood and affect. Her speech is normal and behavior is normal.   Nursing note and vitals reviewed.    Vitals:    05/24/19 1052 05/24/19 1053   BP: 102/70 96/66   BP Location: Left arm Right arm   Patient Position: Sitting Sitting   BP Method: Medium (Manual) Medium (Manual)   Pulse: 72      Lab Results   Component Value Date    CHOL 92 (L) 07/19/2018    CHOL 138 04/05/2017    CHOL 114 (L) 11/07/2016     Lab Results   Component Value Date    HDL 55 07/19/2018    HDL 58 04/05/2017    HDL 51 11/07/2016     Lab Results   Component Value Date    LDLCALC 26.4 (L) 07/19/2018    LDLCALC 62.8 (L) 04/05/2017    LDLCALC 41.8 (L) 11/07/2016     Lab Results   Component Value Date    TRIG 53 07/19/2018    TRIG 86 04/05/2017    TRIG 106 11/07/2016     Lab Results   Component Value Date    CHOLHDL 59.8 (H) 07/19/2018    CHOLHDL 42.0 04/05/2017    CHOLHDL 44.7 11/07/2016       Chemistry        Component Value Date/Time     05/22/2019 1047    K 4.1 05/22/2019 1047     05/22/2019 1047    CO2 25 05/22/2019 1047    BUN 23 05/22/2019 1047    CREATININE 1.5 (H) 05/22/2019 1047    GLU 60 (L) 05/22/2019 1047        Component Value Date/Time    CALCIUM 9.4 05/22/2019 1047    ALKPHOS 60 05/22/2019 1047    AST 38 05/22/2019 1047    ALT 30 05/22/2019 1047    BILITOT 0.4 05/22/2019 1047    ESTGFRAFRICA 38 (A)  05/22/2019 1047    EGFRNONAA 33 (A) 05/22/2019 1047          Lab Results   Component Value Date    TSH 5.071 (H) 02/25/2019     Lab Results   Component Value Date    INR 1.0 11/30/2018    INR 1.0 09/01/2017    INR 1.0 04/05/2017     Lab Results   Component Value Date    WBC 12.22 05/22/2019    HGB 11.3 (L) 05/22/2019    HCT 34.6 (L) 05/22/2019     (H) 05/22/2019     05/22/2019     BMP  Sodium   Date Value Ref Range Status   05/22/2019 143 136 - 145 mmol/L Final     Potassium   Date Value Ref Range Status   05/22/2019 4.1 3.5 - 5.1 mmol/L Final     Chloride   Date Value Ref Range Status   05/22/2019 110 95 - 110 mmol/L Final     CO2   Date Value Ref Range Status   05/22/2019 25 23 - 29 mmol/L Final     BUN, Bld   Date Value Ref Range Status   05/22/2019 23 8 - 23 mg/dL Final     Creatinine   Date Value Ref Range Status   05/22/2019 1.5 (H) 0.5 - 1.4 mg/dL Final     Calcium   Date Value Ref Range Status   05/22/2019 9.4 8.7 - 10.5 mg/dL Final     Anion Gap   Date Value Ref Range Status   05/22/2019 8 8 - 16 mmol/L Final     eGFR if    Date Value Ref Range Status   05/22/2019 38 (A) >60 mL/min/1.73 m^2 Final     eGFR if non    Date Value Ref Range Status   05/22/2019 33 (A) >60 mL/min/1.73 m^2 Final     Comment:     Calculation used to obtain the estimated glomerular filtration  rate (eGFR) is the CKD-EPI equation.        CrCl cannot be calculated (Unknown ideal weight.).    Assessment:     1. Postural hypotension    2. Essential hypertension    3. Mixed hyperlipidemia    4. Hypothyroidism (acquired)    5. Pacemaker    6. Idiopathic chronic gout of multiple sites without tophus    7. Coronary artery disease : multiple vessels, s/p PTCA    8. Ischemic cardiomyopathy    9. S/P gastric bypass    10. Calcification of aorta    11. 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    12. Stage 3 chronic kidney disease    13. Chemotherapy-induced neuropathy    14. S/P TKR (total  knee replacement), bilateral    15. Paroxysmal atrial fibrillation    16. History of congestive heart failure    17. Neuropathic pain of right foot    18. Diffuse large B-cell lymphoma of intrathoracic lymph nodes    19. Vitamin D deficiency      Orthostatic hypotension multifactorial .will stop losartan and imdur   adequate hydration   suppoprt stockkings.   Plan:   As per above   Avoid sudden body movements   F/u in 4 weeks with mid level.

## 2019-05-29 DIAGNOSIS — F41.8 SITUATIONAL ANXIETY: ICD-10-CM

## 2019-05-29 RX ORDER — ALPRAZOLAM 0.25 MG/1
TABLET ORAL
Qty: 60 TABLET | Refills: 0 | Status: SHIPPED | OUTPATIENT
Start: 2019-05-29 | End: 2020-01-06

## 2019-05-29 RX ORDER — FUROSEMIDE 40 MG/1
40 TABLET ORAL DAILY PRN
Qty: 30 TABLET | Refills: 6
Start: 2019-05-29 | End: 2020-01-08

## 2019-05-29 NOTE — TELEPHONE ENCOUNTER
Script request received for daily lasix. Patient has been having issues with orthostatic hypotension. She was advised to stop Losartan and Imdur by Dr. Gil. I am recommending that patient only take her Lasix PRN. Please explain to patient what PRN means

## 2019-06-27 DIAGNOSIS — D50.8 OTHER IRON DEFICIENCY ANEMIA: ICD-10-CM

## 2019-06-28 RX ORDER — IRON,CARBONYL/ASCORBIC ACID 100-250 MG
TABLET ORAL
Qty: 30 TABLET | Refills: 3 | Status: SHIPPED | OUTPATIENT
Start: 2019-06-28 | End: 2019-10-29 | Stop reason: SDUPTHER

## 2019-07-16 DIAGNOSIS — I44.2 CHB (COMPLETE HEART BLOCK): ICD-10-CM

## 2019-07-16 DIAGNOSIS — Z95.0 CARDIAC PACEMAKER IN SITU: Primary | ICD-10-CM

## 2019-07-17 ENCOUNTER — OFFICE VISIT (OUTPATIENT)
Dept: INTERNAL MEDICINE | Facility: CLINIC | Age: 79
End: 2019-07-17
Payer: MEDICARE

## 2019-07-17 VITALS
OXYGEN SATURATION: 95 % | BODY MASS INDEX: 26.04 KG/M2 | TEMPERATURE: 98 F | HEART RATE: 92 BPM | DIASTOLIC BLOOD PRESSURE: 74 MMHG | SYSTOLIC BLOOD PRESSURE: 104 MMHG | WEIGHT: 151.69 LBS

## 2019-07-17 DIAGNOSIS — T45.1X5A CHEMOTHERAPY-INDUCED NEUROPATHY: Chronic | ICD-10-CM

## 2019-07-17 DIAGNOSIS — E03.9 HYPOTHYROIDISM (ACQUIRED): Chronic | ICD-10-CM

## 2019-07-17 DIAGNOSIS — J30.2 SEASONAL ALLERGIC RHINITIS, UNSPECIFIED TRIGGER: ICD-10-CM

## 2019-07-17 DIAGNOSIS — Z91.81 AT RISK FOR FALLS: Primary | ICD-10-CM

## 2019-07-17 DIAGNOSIS — I10 ESSENTIAL HYPERTENSION: Chronic | ICD-10-CM

## 2019-07-17 DIAGNOSIS — F32.9 MAJOR DEPRESSION, CHRONIC: Chronic | ICD-10-CM

## 2019-07-17 DIAGNOSIS — E78.2 MIXED HYPERLIPIDEMIA: ICD-10-CM

## 2019-07-17 DIAGNOSIS — G62.0 CHEMOTHERAPY-INDUCED NEUROPATHY: Chronic | ICD-10-CM

## 2019-07-17 DIAGNOSIS — I95.1 POSTURAL HYPOTENSION: ICD-10-CM

## 2019-07-17 PROCEDURE — 3074F SYST BP LT 130 MM HG: CPT | Mod: HCNC,CPTII,S$GLB, | Performed by: FAMILY MEDICINE

## 2019-07-17 PROCEDURE — 1101F PR PT FALLS ASSESS DOC 0-1 FALLS W/OUT INJ PAST YR: ICD-10-PCS | Mod: HCNC,CPTII,S$GLB, | Performed by: FAMILY MEDICINE

## 2019-07-17 PROCEDURE — 99214 OFFICE O/P EST MOD 30 MIN: CPT | Mod: HCNC,S$GLB,, | Performed by: FAMILY MEDICINE

## 2019-07-17 PROCEDURE — 3078F DIAST BP <80 MM HG: CPT | Mod: HCNC,CPTII,S$GLB, | Performed by: FAMILY MEDICINE

## 2019-07-17 PROCEDURE — 99999 PR PBB SHADOW E&M-EST. PATIENT-LVL IV: ICD-10-PCS | Mod: PBBFAC,HCNC,, | Performed by: FAMILY MEDICINE

## 2019-07-17 PROCEDURE — 1101F PT FALLS ASSESS-DOCD LE1/YR: CPT | Mod: HCNC,CPTII,S$GLB, | Performed by: FAMILY MEDICINE

## 2019-07-17 PROCEDURE — 99999 PR PBB SHADOW E&M-EST. PATIENT-LVL IV: CPT | Mod: PBBFAC,HCNC,, | Performed by: FAMILY MEDICINE

## 2019-07-17 PROCEDURE — 3074F PR MOST RECENT SYSTOLIC BLOOD PRESSURE < 130 MM HG: ICD-10-PCS | Mod: HCNC,CPTII,S$GLB, | Performed by: FAMILY MEDICINE

## 2019-07-17 PROCEDURE — 99214 PR OFFICE/OUTPT VISIT, EST, LEVL IV, 30-39 MIN: ICD-10-PCS | Mod: HCNC,S$GLB,, | Performed by: FAMILY MEDICINE

## 2019-07-17 PROCEDURE — 3078F PR MOST RECENT DIASTOLIC BLOOD PRESSURE < 80 MM HG: ICD-10-PCS | Mod: HCNC,CPTII,S$GLB, | Performed by: FAMILY MEDICINE

## 2019-07-17 RX ORDER — FLUTICASONE PROPIONATE 50 MCG
2 SPRAY, SUSPENSION (ML) NASAL DAILY
Qty: 16 G | Refills: 11 | Status: SHIPPED | OUTPATIENT
Start: 2019-07-17 | End: 2021-12-06 | Stop reason: SDUPTHER

## 2019-07-17 NOTE — Clinical Note
Patient seen for routine visit today. She reports swelling in her feet that is bothersome. Most of her BP meds were stopped due to postural hypotension, which she is still having. She is taking lasix 3 times daily. She reports pain at night, which she feels is due to swelling. She is on neurontin for neuropathy. She is watching salt and drinking adequate fluids. She has not gotten support hose, they were not covered and were too costly. I advised she get OTC support hose as alternative. Do you have any other recommendations? Thank you!Marti

## 2019-07-17 NOTE — PATIENT INSTRUCTIONS
Try OTC support hose  Continue low salt diet  Continue adequate fluids  We will wait and see what Dr. Gil recommends  Also recommend discussing gabapentin with Dr. PANIAGUA for better pain relief at night  Notify me if your headaches worsen/persist  Try getting back on flonase for allergies to see if your headaches resolved

## 2019-07-17 NOTE — Clinical Note
Patient requesting medication for headaches, I think these are tension/stress related. Since she can't take tylenol, do you have any recommendations on what you would be okay with her taking occasionally for headaches?Thanks!

## 2019-07-19 ENCOUNTER — TELEPHONE (OUTPATIENT)
Dept: INTERNAL MEDICINE | Facility: CLINIC | Age: 79
End: 2019-07-19

## 2019-07-19 NOTE — TELEPHONE ENCOUNTER
----- Message from Zheng Mcnally sent at 7/19/2019  4:19 PM CDT -----  Contact: pt   ..Type:  Patient Returning Call    Who Called: pt   Who Left Message for Patient: nurse   Does the patient know what this is regarding?: not sure   Would the patient rather a call back or a response via [facility]? Callback   Best Call Back Number: ..[phone]  Additional Information:

## 2019-07-19 NOTE — TELEPHONE ENCOUNTER
Please notify patient that Dr. Gil recommends she try increasing her lasix to 5 days per week to see if it helps her symptoms. If she doesn't tolerate that dose she should reduce back to 3 days weekly. Please let me know if she has any questions.

## 2019-07-19 NOTE — TELEPHONE ENCOUNTER
----- Message from Nader Gil MD sent at 7/17/2019  5:40 PM CDT -----  Increase lasix to 5 days weekly see if it helps.  ----- Message -----  From: Marti Coronel MD  Sent: 7/17/2019   4:03 PM  To: Nader Gil MD    Patient seen for routine visit today. She reports swelling in her feet that is bothersome. Most of her BP meds were stopped due to postural hypotension, which she is still having. She is taking lasix 3 times daily. She reports pain at night, which she feels is due to swelling. She is on neurontin for neuropathy. She is watching salt and drinking adequate fluids. She has not gotten support hose, they were not covered and were too costly. I advised she get OTC support hose as alternative. Do you have any other recommendations? Thank you!  Marti

## 2019-07-22 ENCOUNTER — CLINICAL SUPPORT (OUTPATIENT)
Dept: CARDIOLOGY | Facility: CLINIC | Age: 79
End: 2019-07-22
Payer: MEDICARE

## 2019-07-22 DIAGNOSIS — I44.2 CHB (COMPLETE HEART BLOCK): ICD-10-CM

## 2019-07-22 DIAGNOSIS — Z95.0 CARDIAC PACEMAKER IN SITU: ICD-10-CM

## 2019-07-22 PROCEDURE — 93280 PACEMAKER PROGRAMMING: ICD-10-PCS | Mod: HCNC,S$GLB,, | Performed by: INTERNAL MEDICINE

## 2019-07-22 PROCEDURE — 93280 PM DEVICE PROGR EVAL DUAL: CPT | Mod: HCNC,S$GLB,, | Performed by: INTERNAL MEDICINE

## 2019-07-23 ENCOUNTER — CLINICAL SUPPORT (OUTPATIENT)
Dept: REHABILITATION | Facility: HOSPITAL | Age: 79
End: 2019-07-23
Payer: MEDICARE

## 2019-07-23 DIAGNOSIS — I95.1 POSTURAL HYPOTENSION: Primary | ICD-10-CM

## 2019-07-23 DIAGNOSIS — Z91.81 AT RISK FOR FALLS: ICD-10-CM

## 2019-07-23 PROCEDURE — 97110 THERAPEUTIC EXERCISES: CPT | Mod: HCNC

## 2019-07-23 PROCEDURE — 97161 PT EVAL LOW COMPLEX 20 MIN: CPT | Mod: HCNC

## 2019-07-25 NOTE — PLAN OF CARE
[facility]  Physical Therapy Initial Evaluation    Name: Violet Mcnally [facility] [place]  Clinic Number: [id number]    Therapy Diagnosis:   Encounter Diagnoses   Name Primary?    Postural hypotension Yes    At risk for falls      Physician: Marti Coronel MD    Physician Orders: PT Eval and Treat   Medical Diagnosis from Referral: At risk for falls  Evaluation Date: 7/23/2019  Authorization Period Expiration: 12/31/2019  Plan of Care Expiration: 8/23/2019  Visit # / Visits authorized: 1/30    Time In: 9:30  Time Out: 10:20  Total Billable Time: 50 minutes    Precautions: Standard and postural hypotension pacemaker    Subjective   Date of onset: 7/17/2019  History of current condition - Violet reports: several falls in last several months.  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 50% stenosis in LAD and RCA.      Depression     Disorder of kidney and ureter     Encounter for blood transfusion     GERD (gastroesophageal reflux disease)     Gout, arthritis     Heart failure     Hx of psychiatric care     Hyperlipidemia     Hypertension     Hypothyroidism     Immune deficiency disorder     Kidney disease     Lung nodule 2014    RML--stable    Obesity     Pacemaker     Metronic    Paroxysmal atrial fibrillation 3/15/2018    Pneumonia     Polyneuropathy     chemo induced    Psychiatric problem     Tobacco dependence     quit 1976    Trouble in sleeping        Surgical History:   Violet Mcnally  Messi  has a past surgical history that includes Hernia repair; Gastric bypass (1993); Coronary stent placement (02/05/2014); Cardiac pacemaker placement (01/22/2015); Coronary angioplasty (02/2014); Colonoscopy (N/A, 4/6/2017); Appendectomy (1966 approx); Cholecystectomy (1993); Joint replacement (Bilateral, 2009); Colon surgery (Right, 2017); Tonsillectomy (1959); Esophagogastroduodenoscopy (N/A, 11/28/2018); and Colonoscopy (N/A, 11/28/2018).    Medications:   Violet has a current medication list which includes the following prescription(s): allopurinol, alprazolam, ascorbic acid (vitamin c), aspirin, calcitriol, clopidogrel, colcrys, diclofenac sodium, ergocalciferol, fluticasone propionate, furosemide, gabapentin, iron-vitamin c 100-250 mg (icar-c), levothyroxine, loratadine, meloxicam, metoprolol tartrate, mirtazapine, multivitamin, pravastatin, ranitidine, sertraline, sodium bicarbonate, tramadol, and zinc acetate, and the following Facility-Administered Medications: denosumab.    Allergies:   Review of patient's allergies indicates:   Allergen Reactions    Corticosteroids (glucocorticoids) Nausea Only and Other (See Comments)     Stomach pain, dizziness, headache    Oxycodone Other (See Comments)     Blood pressure dropped        Imaging:  See Epic  Prior Therapy: Nov 2018  Social History:  lives with their spouse  Occupation: none  Prior Level of Function: Independent with assistive device  Current Level of Function: The patient as an increasing number of falls    Pain:  Current 5/10, worst 10/10, best 4/10   Location: bilateral back , feet  and hands   Description: Aching  Aggravating Factors: Standing, Walking and Lifting  Easing Factors: relaxation and rest    Pts goals: to improve balance    Objective     Mental status : oriented    Sensation: Light Touch: Impaired: Decreased sensation right LE from neuropathy         ROM:  UPPER EXTREMITY--AROM/PROM  (R) UE: WFLs  (L) UE: WFLs           RANGE OF  MOTION--LOWER EXTREMITIES   (R) LE: WFLs  Hamstrings Length: 80 degrees     (L) LE: WFLs  Hamstrings Length: 80 degrees    Strength   Right    Left   Hip Flexors 4+/5 4+/5   Quadriceps 4+/5 4+/5   Hamstrings 4/5 4/5   Ankle DF 4+/5 4+/5   Ankle PF 4-/5 4-/5     GAIT: Violet ambulates 200 feet with increased base of support without assistive device.     Transfers:    OTHER TESTS:     Evaluation    Timed Up and Go 10 sec    Single Limb Stance R LE Unable    Single Limb Stance L LE Unable      Minimum standards/norms:    TUG:  < 13.5 seconds/ Males 7.3 sec, Females 8.1 sec  SLS:  26-29 sec  Ages 20-69    Optimal Instrument     The following scores are patient-reported values, with 1 representing the ability to do the activity without any difficulty, 2 representing the ability to do the activity with little difficulty, 3 representing the ability to do with moderate difficulty, 4 representing the ability to do the activity with much difficulty, 5 representing the inability to do do the activity, and 9 representing that the activity is not applicable.    1.  Lying flat   2  2.  Rolling over  1  3.  Moving - lying to sitting 1  4.  Sitting   1  5.  Squatting   5  6.  Bending/stooping  5  7.  Balancing   4  8.  Kneeling   5  9.  Standing   1  10.  Walking - short distance 2  11.  Walking - long distance 5  12.  Walking - outdoors 5  13.  Climbing stairs  4  14.  Hopping   5  15.  Jumping   5  16.  Running   5  17.  Pushing   3  18.  Pulling   3  19.  Reaching   2  20.  Grasping   3  21.  Lifting   5  22.  Carrying   5      Total  77    Pt/family was provided educational information, including: role of PT, goals for PT, scheduling - pt verbalized understanding. Discussed insurance limitations with pt.     Exercises were reviewed and pt was able to demonstrate them prior to the end of the session. Pt received a written copy of exercises to perform at home.  Pt has no cultural, educational or language barriers to learning  provided.    TREATMENT   Treatment Time In: 10:10  Treatment Time Out: 10:20  Total Treatment time separate from Evaluation:  10 minutes    Violet received therapeutic exercises to develop strength, posture and core stabilization for 10 minutes including:  Bridges  Isometric hip adduction  Supine hip abduction  Bracing supine marching    Home Exercises and Patient Education Provided    Education provided:   - Home exercise program    Written Home Exercises Provided: yes.  Exercises were reviewed and Violet was able to demonstrate them prior to the end of the session.  Violet demonstrated good  understanding of the education provided.     See EMR under Patient Instructions for exercises provided 7/23/2019.    Assessment   Violet is a 79 y.o. female referred to outpatient Physical Therapy with a medical diagnosis of at risk for falls. Pt presents with decreased balance and impaired stance during gait.    Pt prognosis is Good.   Pt will benefit from skilled outpatient Physical Therapy to address the deficits stated above and in the chart below, provide pt/family education, and to maximize pt's level of independence.     Plan of care discussed with patient: Yes  Pt's spiritual, cultural and educational needs considered and patient is agreeable to the plan of care and goals as stated below:     Anticipated Barriers for therapy: none    Medical Necessity is demonstrated by the following  History  Co-morbidities and personal factors that may impact the plan of care Co-morbidities:   history of cancer and falls    Personal Factors:   no deficits     low   Examination  Body Structures and Functions, activity limitations and participation restrictions that may impact the plan of care Body Regions:   lower extremities    Body Systems:    strength  balance  gait    Participation Restrictions:   none    Activity limitations:   Learning and applying knowledge  no deficits    General Tasks and Commands  no  deficits    Communication  no deficits    Mobility  lifting and carrying objects  walking    Self care  washing oneself (bathing, drying, washing hands)  caring for body parts (brushing teeth, shaving, grooming)  dressing    Domestic Life  shopping  cooking  doing house work (cleaning house, washing dishes, laundry)    Interactions/Relationships  no deficits    Life Areas  no deficits    Community and Social Life  community life  recreation and leisure         low   Clinical Presentation stable and uncomplicated low   Decision Making/ Complexity Score: low     Goals:    Short Term Goals: 3 weeks   1.  The patient will be independent with home exercise program.  2.  The patient will improve TUG score to 15 seconds to indicate reduced fall risk.  3.  Improve LE strength from 4/5 to 4+/5 bilateral LEs    Long Term Goals: 6 weeks  1.  The patient will improve strength to 5/5 bilateral lower extremity  2.  The patient will have less than 3/10 pain.  3.  The patient will improve TUG score to 13 to indicate reduced fall risk.  4.  The patient will perform daily activities including community gait and ADLs without impairment.    Plan   Plan of care Certification: 7/23/2019 to 8/24/2019.    Outpatient Physical Therapy 2 times weekly for 6 weeks to include the following interventions: Gait Training, Manual Therapy, Patient Education, Therapeutic Activites and Therapeutic Exercise.     Ti Chance, PT

## 2019-07-26 ENCOUNTER — CLINICAL SUPPORT (OUTPATIENT)
Dept: REHABILITATION | Facility: HOSPITAL | Age: 79
End: 2019-07-26
Payer: MEDICARE

## 2019-07-26 DIAGNOSIS — Z91.81 AT RISK FOR FALLS: ICD-10-CM

## 2019-07-26 DIAGNOSIS — I95.1 POSTURAL HYPOTENSION: Primary | ICD-10-CM

## 2019-07-26 PROCEDURE — 97530 THERAPEUTIC ACTIVITIES: CPT | Mod: HCNC

## 2019-07-26 PROCEDURE — 97110 THERAPEUTIC EXERCISES: CPT | Mod: HCNC

## 2019-07-29 ENCOUNTER — CLINICAL SUPPORT (OUTPATIENT)
Dept: REHABILITATION | Facility: HOSPITAL | Age: 79
End: 2019-07-29
Payer: MEDICARE

## 2019-07-29 DIAGNOSIS — G62.9 NEUROPATHY: ICD-10-CM

## 2019-07-29 DIAGNOSIS — I95.1 POSTURAL HYPOTENSION: Primary | ICD-10-CM

## 2019-07-29 DIAGNOSIS — C85.83 LARGE CELL LYMPHOMA OF INTRA-ABDOMINAL LYMPH NODES: ICD-10-CM

## 2019-07-29 DIAGNOSIS — Z91.81 AT RISK FOR FALLS: ICD-10-CM

## 2019-07-29 DIAGNOSIS — F51.01 PRIMARY INSOMNIA: ICD-10-CM

## 2019-07-29 PROCEDURE — 97530 THERAPEUTIC ACTIVITIES: CPT | Mod: HCNC

## 2019-07-29 PROCEDURE — 97110 THERAPEUTIC EXERCISES: CPT | Mod: HCNC

## 2019-07-29 RX ORDER — CALCITRIOL 0.25 UG/1
CAPSULE ORAL
Qty: 12 CAPSULE | Refills: 5 | Status: SHIPPED | OUTPATIENT
Start: 2019-07-29 | End: 2020-01-31

## 2019-07-29 RX ORDER — MIRTAZAPINE 15 MG/1
TABLET, ORALLY DISINTEGRATING ORAL
Qty: 30 TABLET | Refills: 2 | Status: SHIPPED | OUTPATIENT
Start: 2019-07-29 | End: 2020-05-29

## 2019-07-29 NOTE — PROGRESS NOTES
Physical Therapy Daily Treatment Note     Name: Violet Mcnally [facility] [place]  Clinic Number: [id number]    Therapy Diagnosis:   Encounter Diagnoses   Name Primary?    Postural hypotension Yes    At risk for falls      Physician: Marti Coronel MD    Visit Date: 7/26/2019    Physician Orders: PT Eval and Treat   Medical Diagnosis from Referral: At risk for falls  Evaluation Date: 7/23/2019  Authorization Period Expiration: 12/31/2019  Plan of Care Expiration: 8/23/2019  Visit # / Visits authorized: 2/30    Time In: 2:00  Time Out: 3:00  Total Billable Time: 60 minutes    Precautions: Standard    Subjective     Pt reports: Feeling unsteady.  She was compliant with home exercise program.  Response to previous treatment: N/A  Functional change: N/A    Pain: 4/10  Location: bilateral back , feet  and hands      Objective     Violet received therapeutic exercises to develop strength, ROM and posture for 40 minutes including:  NuStep 10 min for hip ROM and endurance  Bridges  Isometric hip adduction  Supine hip abduction  Bracing supine marching  Standing hip abduction  Standing hip flexion  Heel raises      Violet participated in dynamic functional therapeutic activities to improve functional performance for 10  minutes, including:  Stand wt shifts   Backwards gait  Side stepping  Standing with forward wt shift    Home Exercises Provided and Patient Education Provided     Education provided:   - Home exs    Written Home Exercises Provided: Patient instructed to cont prior HEP.  Exercises were reviewed and Violet was able to demonstrate them prior to the end of the session.  Violet demonstrated good  understanding of the education provided.     See EMR under Patient Instructions for exercises provided prior visit.    Assessment     Violet is a 79 y.o. female referred to outpatient Physical Therapy with a medical diagnosis of at risk for falls. Pt presents with decreased balance and impaired stance during  gait.  Violet is progressing well towards her goals.   Pt prognosis is Excellent.     Pt will continue to benefit from skilled outpatient physical therapy to address the deficits listed in the problem list box on initial evaluation, provide pt/family education and to maximize pt's level of independence in the home and community environment.     Pt's spiritual, cultural and educational needs considered and pt agreeable to plan of care and goals.     Anticipated barriers to physical therapy: none    Goals:   Short Term Goals: 3 weeks   1.  The patient will be independent with home exercise program.  2.  The patient will improve TUG score to 15 seconds to indicate reduced fall risk.  3.  Improve LE strength from 4/5 to 4+/5 bilateral LEs     Long Term Goals: 6 weeks  1.  The patient will improve strength to 5/5 bilateral lower extremity  2.  The patient will have less than 3/10 pain.  3.  The patient will improve TUG score to 13 to indicate reduced fall risk.  4.  The patient will perform daily activities including community gait and ADLs without impairment.    Plan     Plan of care Certification: 7/23/2019 to 8/24/2019.     Outpatient Physical Therapy 2 times weekly for 6 weeks to include the following interventions: Gait Training, Manual Therapy, Patient Education, Therapeutic Activites and Therapeutic Exercise.     Ti Chance, PT

## 2019-07-30 RX ORDER — GABAPENTIN 100 MG/1
200 CAPSULE ORAL 3 TIMES DAILY
Qty: 180 CAPSULE | Refills: 2 | Status: SHIPPED | OUTPATIENT
Start: 2019-07-30 | End: 2019-10-30 | Stop reason: SDUPTHER

## 2019-07-30 NOTE — PROGRESS NOTES
Physical Therapy Daily Treatment Note     Name: Violet Mcnally [facility] [place]  Clinic Number: [id number]    Therapy Diagnosis:   No diagnosis found.  Physician: Marti Coronel MD    Visit Date: 7/29/2019    Physician Orders: PT Eval and Treat   Medical Diagnosis from Referral: At risk for falls  Evaluation Date: 7/23/2019  Authorization Period Expiration: 12/31/2019  Plan of Care Expiration: 8/23/2019  Visit # / Visits authorized: 2/30    Time In: 1:45  Time Out: 2:40  Total Billable Time: 55 minutes    Precautions: Standard    Subjective     Pt reports: feeling uneasy with balance activity  She was compliant with home exercise program.  Response to previous treatment: N/A  Functional change: N/A    Pain: 4/10  Location: bilateral back , feet  and hands      Objective     Violet received therapeutic exercises to develop strength, ROM and posture for 40 minutes including:  NuStep 10 min for hip ROM and endurance  Bridges  Isometric hip adduction 2x15  Supine hip abduction 2x15  Bracing supine marching 2x15  Standing hip abduction 2x15  Standing hip flexion 2x15  Standing hip extension 2x15  Heel raises 2x15  Calf stretches for 5 reps with 10 second hold      Violet participated in dynamic functional therapeutic activities to improve functional performance for 10  minutes, including:  Stand wt shifts   Backwards gait  Side stepping  Standing with forward wt shift    Home Exercises Provided and Patient Education Provided     Education provided:   - Home exs    Written Home Exercises Provided: Patient instructed to cont prior HEP.  Exercises were reviewed and Violet was able to demonstrate them prior to the end of the session.  Violet demonstrated good  understanding of the education provided.     See EMR under Patient Instructions for exercises provided prior visit.    Assessment     Violet is a 79 y.o. female referred to outpatient Physical Therapy with a medical diagnosis of at risk for falls. Pt presents with  decreased balance and impaired stance during gait.  Violet is progressing well towards her goals.   Pt prognosis is Excellent.     Pt will continue to benefit from skilled outpatient physical therapy to address the deficits listed in the problem list box on initial evaluation, provide pt/family education and to maximize pt's level of independence in the home and community environment.     Pt's spiritual, cultural and educational needs considered and pt agreeable to plan of care and goals.     Anticipated barriers to physical therapy: none    Goals:   Short Term Goals: 3 weeks   1.  The patient will be independent with home exercise program.  2.  The patient will improve TUG score to 15 seconds to indicate reduced fall risk.  3.  Improve LE strength from 4/5 to 4+/5 bilateral LEs     Long Term Goals: 6 weeks  1.  The patient will improve strength to 5/5 bilateral lower extremity  2.  The patient will have less than 3/10 pain.  3.  The patient will improve TUG score to 13 to indicate reduced fall risk.  4.  The patient will perform daily activities including community gait and ADLs without impairment.    Plan     Plan of care Certification: 7/23/2019 to 8/24/2019.     Outpatient Physical Therapy 2 times weekly for 6 weeks to include the following interventions: Gait Training, Manual Therapy, Patient Education, Therapeutic Activites and Therapeutic Exercise.     Ti Chance, PT

## 2019-07-31 ENCOUNTER — CLINICAL SUPPORT (OUTPATIENT)
Dept: REHABILITATION | Facility: HOSPITAL | Age: 79
End: 2019-07-31
Payer: MEDICARE

## 2019-07-31 DIAGNOSIS — Z91.81 AT RISK FOR FALLS: Primary | ICD-10-CM

## 2019-07-31 PROCEDURE — 97110 THERAPEUTIC EXERCISES: CPT | Mod: HCNC

## 2019-07-31 PROCEDURE — 97530 THERAPEUTIC ACTIVITIES: CPT | Mod: HCNC

## 2019-08-01 NOTE — PROGRESS NOTES
Physical Therapy Daily Treatment Note     Name: Violet Mcnally [facility] [place]  Clinic Number: [id number]    Therapy Diagnosis:   Encounter Diagnosis   Name Primary?    At risk for falls Yes     Physician: Marti Coronel MD    Visit Date: 7/31/2019    Physician Orders: PT Eval and Treat   Medical Diagnosis from Referral: At risk for falls  Evaluation Date: 7/23/2019  Authorization Period Expiration: 12/31/2019  Plan of Care Expiration: 8/23/2019  Visit # / Visits authorized: 4/30    Time In: 2:00  Time Out: 2:55  Total Billable Time: 55 minutes    Precautions: Standard    Subjective     Pt reports: feeling uneasy with balance activity  She was compliant with home exercise program.  Response to previous treatment: N/A  Functional change: N/A    Pain: 4/10  Location: bilateral back , feet  and hands      Objective     Violet received therapeutic exercises to develop strength, ROM and posture for 40 minutes including:  NuStep 10 min for hip ROM and endurance  Bridges  Isometric hip adduction 2x15  Supine hip abduction 2x15  Bracing supine marching 2x15  Standing hip abduction 2x15  Standing hip flexion 2x15  Standing hip extension 2x15  Heel raises 2x15  Calf stretches for 5 reps with 10 second hold      Violet participated in dynamic functional therapeutic activities to improve functional performance for 10  minutes, including:  Stand wt shifts   Backwards gait  Side stepping  Standing with forward wt shift    Home Exercises Provided and Patient Education Provided     Education provided:   - Home exs    Written Home Exercises Provided: Patient instructed to cont prior HEP.  Exercises were reviewed and Violet was able to demonstrate them prior to the end of the session.  Violet demonstrated good  understanding of the education provided.     See EMR under Patient Instructions for exercises provided prior visit.    Assessment     Violet is a 79 y.o. female referred to outpatient Physical Therapy with a medical  diagnosis of at risk for falls. Pt presents with decreased balance and impaired stance during gait.  Violet is progressing well towards her goals.   Pt prognosis is Excellent.     Pt will continue to benefit from skilled outpatient physical therapy to address the deficits listed in the problem list box on initial evaluation, provide pt/family education and to maximize pt's level of independence in the home and community environment.     Pt's spiritual, cultural and educational needs considered and pt agreeable to plan of care and goals.     Anticipated barriers to physical therapy: none    Goals:   Short Term Goals: 3 weeks   1.  The patient will be independent with home exercise program.  2.  The patient will improve TUG score to 15 seconds to indicate reduced fall risk.  3.  Improve LE strength from 4/5 to 4+/5 bilateral LEs     Long Term Goals: 6 weeks  1.  The patient will improve strength to 5/5 bilateral lower extremity  2.  The patient will have less than 3/10 pain.  3.  The patient will improve TUG score to 13 to indicate reduced fall risk.  4.  The patient will perform daily activities including community gait and ADLs without impairment.    Plan     Plan of care Certification: 7/23/2019 to 8/24/2019.     Outpatient Physical Therapy 2 times weekly for 6 weeks to include the following interventions: Gait Training, Manual Therapy, Patient Education, Therapeutic Activites and Therapeutic Exercise.     Ti Chance, PT

## 2019-08-05 NOTE — PROGRESS NOTES
Subjective:       Patient ID: Violet Swenson is a 79 y.o. female.    Chief Complaint: Follow-up    Patient presents to clinic today for followup of chronic conditions. Patient complains of bilateral feet swelling x 2 weeks. She reports complying with low salt diet. She reports her lasix was changed by Cardiology to three times weekly 3-4 months ago. She also reports pain in her legs at night. She is compliant with gabapentin. She is using rollator and cane for fall prevention. She has had falls at home and continues to have issues with postural hypotension. She is otherwise without particular concerns today.    Review of Systems   Constitutional: Negative for chills, fatigue, fever and unexpected weight change.   Eyes: Negative for visual disturbance.   Respiratory: Negative for shortness of breath.    Cardiovascular: Positive for leg swelling. Negative for chest pain.   Musculoskeletal: Negative for myalgias.   Neurological: Positive for headaches.       Objective:      Physical Exam   Constitutional: She is oriented to person, place, and time. She appears well-developed and well-nourished. No distress.   HENT:   Head: Normocephalic and atraumatic.   Eyes: Pupils are equal, round, and reactive to light. Conjunctivae and EOM are normal. No scleral icterus.   Cardiovascular: Normal rate and regular rhythm. Exam reveals no gallop and no friction rub.   No murmur heard.  Pulmonary/Chest: Effort normal and breath sounds normal.   Musculoskeletal: She exhibits edema (bilateral feet 1-2+).   Neurological: She is alert and oriented to person, place, and time. No cranial nerve deficit. Gait normal.   Psychiatric: She has a normal mood and affect.   Vitals reviewed.      Assessment:       1. At risk for falls    2. Postural hypotension    3. Seasonal allergic rhinitis, unspecified trigger    4. Mixed hyperlipidemia    5. Chemotherapy-induced neuropathy    6. Essential hypertension    7. Hypothyroidism (acquired)     8. Major depression, chronic        Plan:     Problem List Items Addressed This Visit     Chemotherapy-induced neuropathy (Chronic)    Current Assessment & Plan     Advised to discuss adjusting gabapentin with Rheumatology          Essential hypertension (Chronic)    Current Assessment & Plan     Controlled, continue current medications         Hypothyroidism (acquired) (Chronic)    Current Assessment & Plan     Status pending labs, continue levothyroxine           Major depression, chronic (Chronic)    Current Assessment & Plan     Stable on zoloft         Mixed hyperlipidemia (Chronic)    Current Assessment & Plan     On pravastatin; CK normal         Relevant Orders    Lipid panel    Postural hypotension    Current Assessment & Plan     Advised to make position changes slowly and use cane/walker; seek medical attention for falls; Patient expressed understanding.           Relevant Orders    Ambulatory Referral to Physical/Occupational Therapy    Ambulatory referral to Outpatient Case Management    Seasonal allergic rhinitis    Relevant Medications    fluticasone propionate (FLONASE) 50 mcg/actuation nasal spray      Other Visit Diagnoses     At risk for falls    -  Primary    Relevant Orders    Ambulatory Referral to Physical/Occupational Therapy    Ambulatory referral to Outpatient Case Management          Health Maintenance reviewed/updated.    Advised patient the following:  Try OTC support hose  Continue low salt diet  Continue adequate fluids  We will wait and see what Dr. Gil recommends  Also recommend discussing gabapentin with Dr. PANIAGUA for better pain relief at night  Notify me if your headaches worsen/persist  Try getting back on flonase for allergies to see if your headaches resolved

## 2019-08-05 NOTE — ASSESSMENT & PLAN NOTE
Advised to make position changes slowly and use cane/walker; seek medical attention for falls; Patient expressed understanding.

## 2019-08-06 ENCOUNTER — OUTPATIENT CASE MANAGEMENT (OUTPATIENT)
Dept: ADMINISTRATIVE | Facility: OTHER | Age: 79
End: 2019-08-06

## 2019-08-06 NOTE — LETTER
August 7, 2019    Violet Swenson  [id number] [facility] [id number]             [facility] [address] [place] [address] Dear: Violet Swenson    I am writing from the Outpatient Complex Care Management Department at [facility].  I received a referral from Dr. Marti Coronel to contact you  regarding any needs you may have. I have attempted to contact you by phone two times unsuccessfully.  Please contact the Outpatient Complex Care Management Department at [phone] if you would like to discuss your needs.      Sincerely,         Marti Craft LMSW

## 2019-08-07 NOTE — PROGRESS NOTES
2nd attempt    This LMSW attempted to reach patient/caregiver to provide resource and left msg requesting a return call.  Letter with contact information was sent via US Mail  to patient/caregiver.  Referral source notified.

## 2019-08-14 ENCOUNTER — LAB VISIT (OUTPATIENT)
Dept: LAB | Facility: HOSPITAL | Age: 79
End: 2019-08-14
Attending: INTERNAL MEDICINE
Payer: MEDICARE

## 2019-08-14 ENCOUNTER — OFFICE VISIT (OUTPATIENT)
Dept: HEMATOLOGY/ONCOLOGY | Facility: CLINIC | Age: 79
End: 2019-08-14
Payer: MEDICARE

## 2019-08-14 VITALS
HEART RATE: 88 BPM | HEIGHT: 64 IN | DIASTOLIC BLOOD PRESSURE: 61 MMHG | SYSTOLIC BLOOD PRESSURE: 100 MMHG | WEIGHT: 140.88 LBS | BODY MASS INDEX: 24.05 KG/M2 | RESPIRATION RATE: 16 BRPM | TEMPERATURE: 97 F

## 2019-08-14 DIAGNOSIS — D64.9 CHRONIC ANEMIA: Chronic | ICD-10-CM

## 2019-08-14 DIAGNOSIS — C85.83 LARGE CELL LYMPHOMA OF INTRA-ABDOMINAL LYMPH NODES: ICD-10-CM

## 2019-08-14 DIAGNOSIS — C85.83 LARGE CELL LYMPHOMA OF INTRA-ABDOMINAL LYMPH NODES: Primary | ICD-10-CM

## 2019-08-14 DIAGNOSIS — D53.9 MACROCYTIC ANEMIA: ICD-10-CM

## 2019-08-14 DIAGNOSIS — C83.30 DIFFUSE LARGE B-CELL LYMPHOMA, UNSPECIFIED BODY REGION: ICD-10-CM

## 2019-08-14 LAB
ALBUMIN SERPL BCP-MCNC: 3.2 G/DL (ref 3.5–5.2)
ALP SERPL-CCNC: 69 U/L (ref 55–135)
ALT SERPL W/O P-5'-P-CCNC: 29 U/L (ref 10–44)
ANION GAP SERPL CALC-SCNC: 9 MMOL/L (ref 8–16)
AST SERPL-CCNC: 35 U/L (ref 10–40)
BASOPHILS # BLD AUTO: 0.02 K/UL (ref 0–0.2)
BASOPHILS NFR BLD: 0.2 % (ref 0–1.9)
BILIRUB SERPL-MCNC: 0.2 MG/DL (ref 0.1–1)
BUN SERPL-MCNC: 38 MG/DL (ref 8–23)
CALCIUM SERPL-MCNC: 8.9 MG/DL (ref 8.7–10.5)
CHLORIDE SERPL-SCNC: 110 MMOL/L (ref 95–110)
CO2 SERPL-SCNC: 21 MMOL/L (ref 23–29)
CREAT SERPL-MCNC: 1.9 MG/DL (ref 0.5–1.4)
DIFFERENTIAL METHOD: ABNORMAL
EOSINOPHIL # BLD AUTO: 0.2 K/UL (ref 0–0.5)
EOSINOPHIL NFR BLD: 2.2 % (ref 0–8)
ERYTHROCYTE [DISTWIDTH] IN BLOOD BY AUTOMATED COUNT: 17.8 % (ref 11.5–14.5)
EST. GFR  (AFRICAN AMERICAN): 29 ML/MIN/1.73 M^2
EST. GFR  (NON AFRICAN AMERICAN): 25 ML/MIN/1.73 M^2
GLUCOSE SERPL-MCNC: 122 MG/DL (ref 70–110)
HCT VFR BLD AUTO: 33.3 % (ref 37–48.5)
HGB BLD-MCNC: 10.7 G/DL (ref 12–16)
LYMPHOCYTES # BLD AUTO: 3.6 K/UL (ref 1–4.8)
LYMPHOCYTES NFR BLD: 34.4 % (ref 18–48)
MCH RBC QN AUTO: 32.9 PG (ref 27–31)
MCHC RBC AUTO-ENTMCNC: 32.1 G/DL (ref 32–36)
MCV RBC AUTO: 103 FL (ref 82–98)
MONOCYTES # BLD AUTO: 0.9 K/UL (ref 0.3–1)
MONOCYTES NFR BLD: 8.8 % (ref 4–15)
NEUTROPHILS # BLD AUTO: 5.8 K/UL (ref 1.8–7.7)
NEUTROPHILS NFR BLD: 54.7 % (ref 38–73)
PLATELET # BLD AUTO: 225 K/UL (ref 150–350)
PMV BLD AUTO: 10.5 FL (ref 9.2–12.9)
POTASSIUM SERPL-SCNC: 4.7 MMOL/L (ref 3.5–5.1)
PROT SERPL-MCNC: 6.7 G/DL (ref 6–8.4)
RBC # BLD AUTO: 3.25 M/UL (ref 4–5.4)
SODIUM SERPL-SCNC: 140 MMOL/L (ref 136–145)
WBC # BLD AUTO: 10.57 K/UL (ref 3.9–12.7)

## 2019-08-14 PROCEDURE — 3078F DIAST BP <80 MM HG: CPT | Mod: HCNC,CPTII,S$GLB, | Performed by: INTERNAL MEDICINE

## 2019-08-14 PROCEDURE — 85025 COMPLETE CBC W/AUTO DIFF WBC: CPT | Mod: HCNC

## 2019-08-14 PROCEDURE — 99999 PR PBB SHADOW E&M-EST. PATIENT-LVL V: CPT | Mod: PBBFAC,HCNC,, | Performed by: INTERNAL MEDICINE

## 2019-08-14 PROCEDURE — 1101F PT FALLS ASSESS-DOCD LE1/YR: CPT | Mod: HCNC,CPTII,S$GLB, | Performed by: INTERNAL MEDICINE

## 2019-08-14 PROCEDURE — 99214 OFFICE O/P EST MOD 30 MIN: CPT | Mod: HCNC,S$GLB,, | Performed by: INTERNAL MEDICINE

## 2019-08-14 PROCEDURE — 3074F SYST BP LT 130 MM HG: CPT | Mod: HCNC,CPTII,S$GLB, | Performed by: INTERNAL MEDICINE

## 2019-08-14 PROCEDURE — 80053 COMPREHEN METABOLIC PANEL: CPT | Mod: HCNC

## 2019-08-14 PROCEDURE — 99999 PR PBB SHADOW E&M-EST. PATIENT-LVL V: ICD-10-PCS | Mod: PBBFAC,HCNC,, | Performed by: INTERNAL MEDICINE

## 2019-08-14 PROCEDURE — 99214 PR OFFICE/OUTPT VISIT, EST, LEVL IV, 30-39 MIN: ICD-10-PCS | Mod: HCNC,S$GLB,, | Performed by: INTERNAL MEDICINE

## 2019-08-14 PROCEDURE — 3078F PR MOST RECENT DIASTOLIC BLOOD PRESSURE < 80 MM HG: ICD-10-PCS | Mod: HCNC,CPTII,S$GLB, | Performed by: INTERNAL MEDICINE

## 2019-08-14 PROCEDURE — 3074F PR MOST RECENT SYSTOLIC BLOOD PRESSURE < 130 MM HG: ICD-10-PCS | Mod: HCNC,CPTII,S$GLB, | Performed by: INTERNAL MEDICINE

## 2019-08-14 PROCEDURE — 99499 RISK ADDL DX/OHS AUDIT: ICD-10-PCS | Mod: HCNC,S$GLB,, | Performed by: INTERNAL MEDICINE

## 2019-08-14 PROCEDURE — 1101F PR PT FALLS ASSESS DOC 0-1 FALLS W/OUT INJ PAST YR: ICD-10-PCS | Mod: HCNC,CPTII,S$GLB, | Performed by: INTERNAL MEDICINE

## 2019-08-14 PROCEDURE — 99499 UNLISTED E&M SERVICE: CPT | Mod: HCNC,S$GLB,, | Performed by: INTERNAL MEDICINE

## 2019-08-14 PROCEDURE — 36415 COLL VENOUS BLD VENIPUNCTURE: CPT | Mod: HCNC

## 2019-08-14 NOTE — PROGRESS NOTES
Subjective:       Patient ID: Violet Swenson is a 79 y.o. female.    Chief Complaint: Lymphoma and Results (labs)    HPI This 78 year old  lady  comes for follow up of her th diffuse large cell lymphoma.   A CT of   the abdomen done on 04/05/2017, done because of abdominal pain ,  was reported as showing a 6.9 x 7.0 x 8.4 cm soft  tissue mass in the right lower quadrant in the terminal ileum abutting the   cecum. There was adjacent conglomerate adenopathy in the right lower quadrant   mesentery.     The patient had a colonoscopy that showed a lesion in the terminal ileum.     She underwent a right hemicolectomy and terminal ileum removal. 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  She had a Ct/PET that showed evidence of surgery and some non specific findings, but no evidence of active disease elsewhere.  An ECHO showed normal cardiac ejection fraction, as well as a MUGA.  She was seen cardiology and cleared for ADRIAMYCIN administration.  She was negative for Hep B/C and HIV  Bone marrow was negative.     The patient started  R-CHOP. Treatment  She tolerated the treatment with some minor difficulties. After 3 cycles, she had a repeat CT/PET  There was no activity in the abdomen.  There was development of ground glass opacities/infiltrates in both lungs which were hypermetabolic although the SUV was not reported.  We discussed the imaging studies with Radiology and Pulmonary.  Dr Corral of the Pulmonary Department favored the findings to be due to pulmonary congestion.  Her troponin was  normal, but her BNP was markedly elevated at 1,103.  She was started on Lasix by dr Corral and asked to have a  Ct in few weeks  The patient   had evaluation by Cardiology ( dr Gil).It was felt had developed CHF., with a decrease in her ejection traction to 47%., elevated BNP and imaging studies.  The patient indicated her leg  edema and SOB   improved on lasix. Repeat  PET showed clearance of all the infiltrates  Since, she has had a complete work including cardiac catheterization with negative findings.  A She comes for follow up. She had a CT/PET in 2018 that shows no evidence of recurrence. Repeat CT/PET 2018  And 2019 showed also no evidence of recurrence  During the last visit, while in the clinic, she had a near syncopal episode She got up, felt faint as if as if she was going to pass out. She needed assistance from my medical assistant personnel.  She has met with cardiology regarding hr syncopal episodes and her Lasix was lowered to 5 times a week.    She complains of morning nausea and itching. Claritin did not help  ALLERGIES:see med card     MEDICATIONS: See MedCard.     PREVIOUS SURGERIES: Appendectomy in , tonsillectomy at age 19, gastric   bypass with incidental cholecystectomy, bilateral knee replacement in .     SOCIAL HISTORY: She is . She had two natural children, and one adopted   one. The adopted child has . She lives in [place] with her   . She smoked for two years, averaging a pack a day. She stopped 35   years ago or so. Denies any alcohol intake. She worked in [employer].     FAMILY HISTORY: Father  of colon cancer. Younger brother had leukemia that  transform into a lymphoma. Sister had pancreatic cancer          Review of Systems   Constitutional: Negative.    HENT: Negative.    Eyes: Negative.    Respiratory: Negative.  Negative for cough and wheezing.    Cardiovascular: Negative.  Negative for chest pain.   Gastrointestinal: Negative.    Genitourinary: Negative.    Neurological: Negative.    Psychiatric/Behavioral: Negative.        Objective:      Physical Exam   Constitutional: She is oriented to person, place, and time. She appears well-developed and well-nourished.   HENT:   Head: Normocephalic.   Eyes: Pupils are equal, round, and reactive to light. Conjunctivae are normal.   Neck: Neck supple. No  thyromegaly present.   Cardiovascular: Normal rate and normal heart sounds.   Pulmonary/Chest: No respiratory distress. She has no wheezes. She has no rales. She exhibits no tenderness. No breast tenderness or discharge.   Abdominal: She exhibits no distension and no mass. There is no tenderness. There is no rebound and no guarding.   Genitourinary: No breast tenderness or discharge.   Musculoskeletal: She exhibits no edema or tenderness.   Lymphadenopathy:     She has no cervical adenopathy.   Neurological: She is alert and oriented to person, place, and time. No cranial nerve deficit.   Skin: No rash noted. No erythema. No pallor.       Wt Readings from Last 3 Encounters:   08/14/19 63.9 kg (140 lb 14 oz)   07/17/19 68.8 kg (151 lb 10.8 oz)   05/22/19 65.5 kg (144 lb 6.4 oz)     Temp Readings from Last 3 Encounters:   08/14/19 97 °F (36.1 °C)   07/17/19 98.4 °F (36.9 °C) (Tympanic)   05/22/19 97.5 °F (36.4 °C)     BP Readings from Last 3 Encounters:   08/14/19 100/61   07/17/19 104/74   05/24/19 96/66     Pulse Readings from Last 3 Encounters:   08/14/19 88   07/17/19 92   05/24/19 72       Assessment:       1. 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 Diffuse large B-cell lymphoma, unspecified body region    3. Chronic anemia    4. Macrocytic anemia        Plan:       Lab Results   Component Value Date    WBC 10.57 08/14/2019    HGB 10.7 (L) 08/14/2019    HCT 33.3 (L) 08/14/2019     (H) 08/14/2019     08/14/2019    CMP pending    She will be asked to use lanolin cream for dry itch and try OTC benadryl for the itching. If itching persist in next 4 weeks or so, she will let me know and we will order a CT/PET. We had planned on doing one once a year and her last one was 2/2019.  Im suspect her am nausea is probably due to post nasal drip.  She remains anemic with some macrocytic indexes so we ill have return in 3 months with a cbc/ferritin/TIBC/CMP/folate and b12.  '

## 2019-08-22 ENCOUNTER — TELEPHONE (OUTPATIENT)
Dept: INTERNAL MEDICINE | Facility: CLINIC | Age: 79
End: 2019-08-22

## 2019-08-22 NOTE — TELEPHONE ENCOUNTER
Informed patient that Dr. Morales states that she can take tylenol if needed. Just not to take more then 2,000 mg in a 24 hour period. Also if she continues to have headaches, recommend to follow up. Patient had no questions and verbalized understanding.

## 2019-08-22 NOTE — TELEPHONE ENCOUNTER
----- Message from Dahiana Morales MD sent at 7/18/2019  9:14 AM CDT -----  Patients with liver disease can take Tylenol.  In fact it is one of the safest medicines they can take.  The patients cannot take more than 2000 mg daily.  ----- Message -----  From: Genie Del Rio MA  Sent: 7/18/2019   8:38 AM  To: Dahiana Morales MD        ----- Message -----  From: Marti Coronel MD  Sent: 7/17/2019   4:22 PM  To: [facility] Staff    Patient requesting medication for headaches, I think these are tension/stress related. Since she can't take tylenol, do you have any recommendations on what you would be okay with her taking occasionally for headaches?  Thanks!

## 2019-08-22 NOTE — TELEPHONE ENCOUNTER
Please notify patient that Dr. Morales states she can take tylenol if needed. She should not take more than 2,000 mg in a 24 hour period. If she continues to have headaches, recommend follow up. Please let me know if she has any questions.

## 2019-08-28 DIAGNOSIS — F41.8 SITUATIONAL ANXIETY: ICD-10-CM

## 2019-08-28 DIAGNOSIS — F51.05 INSOMNIA SECONDARY TO ANXIETY: ICD-10-CM

## 2019-08-28 DIAGNOSIS — F41.9 INSOMNIA SECONDARY TO ANXIETY: ICD-10-CM

## 2019-08-28 DIAGNOSIS — F32.9 MAJOR DEPRESSION, CHRONIC: ICD-10-CM

## 2019-08-28 RX ORDER — SERTRALINE HYDROCHLORIDE 100 MG/1
TABLET, FILM COATED ORAL
Qty: 90 TABLET | Refills: 3 | Status: SHIPPED | OUTPATIENT
Start: 2019-08-28 | End: 2020-04-23

## 2019-09-09 ENCOUNTER — TELEPHONE (OUTPATIENT)
Dept: RHEUMATOLOGY | Facility: CLINIC | Age: 79
End: 2019-09-09

## 2019-09-13 ENCOUNTER — PES CALL (OUTPATIENT)
Dept: ADMINISTRATIVE | Facility: CLINIC | Age: 79
End: 2019-09-13

## 2019-09-23 ENCOUNTER — PES CALL (OUTPATIENT)
Dept: ADMINISTRATIVE | Facility: CLINIC | Age: 79
End: 2019-09-23

## 2019-10-02 DIAGNOSIS — M15.9 PRIMARY OSTEOARTHRITIS INVOLVING MULTIPLE JOINTS: ICD-10-CM

## 2019-10-02 DIAGNOSIS — E03.9 HYPOTHYROIDISM (ACQUIRED): Chronic | ICD-10-CM

## 2019-10-02 RX ORDER — MELOXICAM 7.5 MG/1
TABLET ORAL
Qty: 90 TABLET | Refills: 3 | Status: SHIPPED | OUTPATIENT
Start: 2019-10-02 | End: 2020-08-10

## 2019-10-03 RX ORDER — LEVOTHYROXINE SODIUM 75 UG/1
TABLET ORAL
Qty: 90 TABLET | Refills: 1 | Status: SHIPPED | OUTPATIENT
Start: 2019-10-03 | End: 2020-04-06

## 2019-10-29 DIAGNOSIS — D50.8 OTHER IRON DEFICIENCY ANEMIA: ICD-10-CM

## 2019-10-29 RX ORDER — IRON,CARBONYL/ASCORBIC ACID 100-250 MG
TABLET ORAL
Qty: 30 TABLET | Refills: 3 | Status: SHIPPED | OUTPATIENT
Start: 2019-10-29 | End: 2020-03-02

## 2019-10-30 DIAGNOSIS — C85.83 LARGE CELL LYMPHOMA OF INTRA-ABDOMINAL LYMPH NODES: ICD-10-CM

## 2019-10-30 DIAGNOSIS — G62.9 NEUROPATHY: ICD-10-CM

## 2019-11-05 ENCOUNTER — TELEPHONE (OUTPATIENT)
Dept: NEPHROLOGY | Facility: CLINIC | Age: 79
End: 2019-11-05

## 2019-11-05 DIAGNOSIS — N18.31 CHRONIC KIDNEY DISEASE (CKD) STAGE G3A/A1, MODERATELY DECREASED GLOMERULAR FILTRATION RATE (GFR) BETWEEN 45-59 ML/MIN/1.73 SQUARE METER AND ALBUMINURIA CREATININE RATIO LESS THAN 30 MG/G: Primary | ICD-10-CM

## 2019-11-05 RX ORDER — GABAPENTIN 100 MG/1
200 CAPSULE ORAL 3 TIMES DAILY
Qty: 180 CAPSULE | Refills: 2 | Status: SHIPPED | OUTPATIENT
Start: 2019-11-05 | End: 2020-02-03

## 2019-11-05 NOTE — TELEPHONE ENCOUNTER
----- Message from Mckenzie Melgar sent at 11/5/2019  1:28 PM CST -----  Contact: pt  .Type:  Sooner Apoointment Request    Caller is requesting a sooner appointment.  Caller declined first available appointment listed below.  Caller will not accept being placed on the waitlist and is requesting a message be sent to doctor.  Name of Caller: pt  When is the first available appointment? 12/5 ([place] location )  Symptoms: f/u   Would the patient rather a call back or a response via [facility]?  Call back   Best Call Back Number: [phone]  Additional Information:  Pt received letter to schedule

## 2019-11-07 ENCOUNTER — LAB VISIT (OUTPATIENT)
Dept: LAB | Facility: HOSPITAL | Age: 79
End: 2019-11-07
Attending: INTERNAL MEDICINE
Payer: MEDICARE

## 2019-11-07 DIAGNOSIS — C83.30 DIFFUSE LARGE B-CELL LYMPHOMA, UNSPECIFIED BODY REGION: ICD-10-CM

## 2019-11-07 DIAGNOSIS — D53.9 MACROCYTIC ANEMIA: ICD-10-CM

## 2019-11-07 DIAGNOSIS — D64.9 CHRONIC ANEMIA: Chronic | ICD-10-CM

## 2019-11-07 DIAGNOSIS — C85.83 LARGE CELL LYMPHOMA OF INTRA-ABDOMINAL LYMPH NODES: ICD-10-CM

## 2019-11-07 LAB
ALBUMIN SERPL BCP-MCNC: 3.5 G/DL (ref 3.5–5.2)
ALP SERPL-CCNC: 63 U/L (ref 55–135)
ALT SERPL W/O P-5'-P-CCNC: 33 U/L (ref 10–44)
ANION GAP SERPL CALC-SCNC: 9 MMOL/L (ref 8–16)
AST SERPL-CCNC: 43 U/L (ref 10–40)
BASOPHILS # BLD AUTO: 0.04 K/UL (ref 0–0.2)
BASOPHILS NFR BLD: 0.6 % (ref 0–1.9)
BILIRUB SERPL-MCNC: 0.4 MG/DL (ref 0.1–1)
BUN SERPL-MCNC: 21 MG/DL (ref 8–23)
CALCIUM SERPL-MCNC: 9.7 MG/DL (ref 8.7–10.5)
CHLORIDE SERPL-SCNC: 111 MMOL/L (ref 95–110)
CO2 SERPL-SCNC: 24 MMOL/L (ref 23–29)
CREAT SERPL-MCNC: 1.3 MG/DL (ref 0.5–1.4)
DIFFERENTIAL METHOD: ABNORMAL
EOSINOPHIL # BLD AUTO: 0.3 K/UL (ref 0–0.5)
EOSINOPHIL NFR BLD: 4.9 % (ref 0–8)
ERYTHROCYTE [DISTWIDTH] IN BLOOD BY AUTOMATED COUNT: 15.9 % (ref 11.5–14.5)
EST. GFR  (AFRICAN AMERICAN): 45 ML/MIN/1.73 M^2
EST. GFR  (NON AFRICAN AMERICAN): 39 ML/MIN/1.73 M^2
FERRITIN SERPL-MCNC: 85 NG/ML (ref 20–300)
FOLATE SERPL-MCNC: 18.1 NG/ML (ref 4–24)
GLUCOSE SERPL-MCNC: 97 MG/DL (ref 70–110)
HCT VFR BLD AUTO: 32.8 % (ref 37–48.5)
HGB BLD-MCNC: 10.3 G/DL (ref 12–16)
IMM GRANULOCYTES # BLD AUTO: 0.02 K/UL (ref 0–0.04)
IMM GRANULOCYTES NFR BLD AUTO: 0.3 % (ref 0–0.5)
IRON SERPL-MCNC: 105 UG/DL (ref 30–160)
LYMPHOCYTES # BLD AUTO: 2.2 K/UL (ref 1–4.8)
LYMPHOCYTES NFR BLD: 34.4 % (ref 18–48)
MCH RBC QN AUTO: 32.5 PG (ref 27–31)
MCHC RBC AUTO-ENTMCNC: 31.4 G/DL (ref 32–36)
MCV RBC AUTO: 104 FL (ref 82–98)
MONOCYTES # BLD AUTO: 0.3 K/UL (ref 0.3–1)
MONOCYTES NFR BLD: 5.2 % (ref 4–15)
NEUTROPHILS # BLD AUTO: 3.5 K/UL (ref 1.8–7.7)
NEUTROPHILS NFR BLD: 54.6 % (ref 38–73)
NRBC BLD-RTO: 0 /100 WBC
PLATELET # BLD AUTO: 246 K/UL (ref 150–350)
PMV BLD AUTO: 10.5 FL (ref 9.2–12.9)
POTASSIUM SERPL-SCNC: 4 MMOL/L (ref 3.5–5.1)
PROT SERPL-MCNC: 6.9 G/DL (ref 6–8.4)
RBC # BLD AUTO: 3.17 M/UL (ref 4–5.4)
SATURATED IRON: 24 % (ref 20–50)
SODIUM SERPL-SCNC: 144 MMOL/L (ref 136–145)
TOTAL IRON BINDING CAPACITY: 429 UG/DL (ref 250–450)
TRANSFERRIN SERPL-MCNC: 290 MG/DL (ref 200–375)
VIT B12 SERPL-MCNC: >2000 PG/ML (ref 210–950)
WBC # BLD AUTO: 6.48 K/UL (ref 3.9–12.7)

## 2019-11-07 PROCEDURE — 80053 COMPREHEN METABOLIC PANEL: CPT | Mod: HCNC

## 2019-11-07 PROCEDURE — 82607 VITAMIN B-12: CPT | Mod: HCNC

## 2019-11-07 PROCEDURE — 82746 ASSAY OF FOLIC ACID SERUM: CPT | Mod: HCNC

## 2019-11-07 PROCEDURE — 83540 ASSAY OF IRON: CPT | Mod: HCNC

## 2019-11-07 PROCEDURE — 36415 COLL VENOUS BLD VENIPUNCTURE: CPT | Mod: HCNC

## 2019-11-07 PROCEDURE — 82728 ASSAY OF FERRITIN: CPT | Mod: HCNC

## 2019-11-12 ENCOUNTER — OFFICE VISIT (OUTPATIENT)
Dept: HEMATOLOGY/ONCOLOGY | Facility: CLINIC | Age: 79
End: 2019-11-12
Payer: MEDICARE

## 2019-11-12 VITALS
BODY MASS INDEX: 24.5 KG/M2 | HEIGHT: 64 IN | OXYGEN SATURATION: 98 % | RESPIRATION RATE: 16 BRPM | WEIGHT: 143.5 LBS | SYSTOLIC BLOOD PRESSURE: 115 MMHG | HEART RATE: 83 BPM | DIASTOLIC BLOOD PRESSURE: 81 MMHG | TEMPERATURE: 97 F

## 2019-11-12 DIAGNOSIS — C85.83 LARGE CELL LYMPHOMA OF INTRA-ABDOMINAL LYMPH NODES: Primary | ICD-10-CM

## 2019-11-12 DIAGNOSIS — C83.38 DIFFUSE LARGE B-CELL LYMPHOMA OF LYMPH NODES OF MULTIPLE REGIONS: ICD-10-CM

## 2019-11-12 DIAGNOSIS — F41.9 ANXIETY: ICD-10-CM

## 2019-11-12 DIAGNOSIS — L29.9 ITCHING: ICD-10-CM

## 2019-11-12 PROCEDURE — 99499 RISK ADDL DX/OHS AUDIT: ICD-10-PCS | Mod: HCNC,S$GLB,, | Performed by: INTERNAL MEDICINE

## 2019-11-12 PROCEDURE — 3079F DIAST BP 80-89 MM HG: CPT | Mod: HCNC,CPTII,S$GLB, | Performed by: INTERNAL MEDICINE

## 2019-11-12 PROCEDURE — 3288F PR FALLS RISK ASSESSMENT DOCUMENTED: ICD-10-PCS | Mod: HCNC,CPTII,S$GLB, | Performed by: INTERNAL MEDICINE

## 2019-11-12 PROCEDURE — 99999 PR PBB SHADOW E&M-EST. PATIENT-LVL V: ICD-10-PCS | Mod: PBBFAC,HCNC,, | Performed by: INTERNAL MEDICINE

## 2019-11-12 PROCEDURE — 3288F FALL RISK ASSESSMENT DOCD: CPT | Mod: HCNC,CPTII,S$GLB, | Performed by: INTERNAL MEDICINE

## 2019-11-12 PROCEDURE — 99214 PR OFFICE/OUTPT VISIT, EST, LEVL IV, 30-39 MIN: ICD-10-PCS | Mod: HCNC,S$GLB,, | Performed by: INTERNAL MEDICINE

## 2019-11-12 PROCEDURE — 3079F PR MOST RECENT DIASTOLIC BLOOD PRESSURE 80-89 MM HG: ICD-10-PCS | Mod: HCNC,CPTII,S$GLB, | Performed by: INTERNAL MEDICINE

## 2019-11-12 PROCEDURE — 99214 OFFICE O/P EST MOD 30 MIN: CPT | Mod: HCNC,S$GLB,, | Performed by: INTERNAL MEDICINE

## 2019-11-12 PROCEDURE — 1100F PR PT FALLS ASSESS DOC 2+ FALLS/FALL W/INJURY/YR: ICD-10-PCS | Mod: HCNC,CPTII,S$GLB, | Performed by: INTERNAL MEDICINE

## 2019-11-12 PROCEDURE — 99499 UNLISTED E&M SERVICE: CPT | Mod: HCNC,S$GLB,, | Performed by: INTERNAL MEDICINE

## 2019-11-12 PROCEDURE — 3074F SYST BP LT 130 MM HG: CPT | Mod: HCNC,CPTII,S$GLB, | Performed by: INTERNAL MEDICINE

## 2019-11-12 PROCEDURE — 1100F PTFALLS ASSESS-DOCD GE2>/YR: CPT | Mod: HCNC,CPTII,S$GLB, | Performed by: INTERNAL MEDICINE

## 2019-11-12 PROCEDURE — 99999 PR PBB SHADOW E&M-EST. PATIENT-LVL V: CPT | Mod: PBBFAC,HCNC,, | Performed by: INTERNAL MEDICINE

## 2019-11-12 PROCEDURE — 3074F PR MOST RECENT SYSTOLIC BLOOD PRESSURE < 130 MM HG: ICD-10-PCS | Mod: HCNC,CPTII,S$GLB, | Performed by: INTERNAL MEDICINE

## 2019-11-12 RX ORDER — ERGOCALCIFEROL (VITAMIN D2) 10 MCG
TABLET ORAL
COMMUNITY
End: 2021-11-05

## 2019-11-12 NOTE — PROGRESS NOTES
Subjective:       Patient ID: Violet Swenson is a 79 y.o. female.    Chief Complaint: Lymphoma    HPI This 78 year old  lady  comes for follow up of her th diffuse large cell lymphoma.   A CT of   the abdomen done on 04/05/2017, done because of abdominal pain ,  was reported as showing a 6.9 x 7.0 x 8.4 cm soft  tissue mass in the right lower quadrant in the terminal ileum abutting the   cecum. There was adjacent conglomerate adenopathy in the right lower quadrant   mesentery.     The patient had a colonoscopy that showed a lesion in the terminal ileum.     She underwent a right hemicolectomy and terminal ileum removal. 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  She had a Ct/PET that showed evidence of surgery and some non specific findings, but no evidence of active disease elsewhere.  An ECHO showed normal cardiac ejection fraction, as well as a MUGA.  She was seen cardiology and cleared for ADRIAMYCIN administration.  She was negative for Hep B/C and HIV  Bone marrow was negative.     The patient started  R-CHOP. Treatment  She tolerated the treatment with some minor difficulties. After 3 cycles, she had a repeat CT/PET  There was no activity in the abdomen.  There was development of ground glass opacities/infiltrates in both lungs which were hypermetabolic although the SUV was not reported.  We discussed the imaging studies with Radiology and Pulmonary.  Dr Corral of the Pulmonary Department favored the findings to be due to pulmonary congestion.  Her troponin was  normal, but her BNP was markedly elevated at 1,103.  She was started on Lasix by dr Corral and asked to have a  Ct in few weeks  The patient   had evaluation by Cardiology ( dr Gil).It was felt had developed CHF., with a decrease in her ejection traction to 47%., elevated BNP and imaging studies.  The patient indicated her leg  edema and SOB   improved on lasix. Repeat PET showed  clearance of all the infiltrates  Since, she has had a complete work including cardiac catheterization with negative findings.  A She comes for follow up. She had a CT/PET in 2018 that shows no evidence of recurrence. Repeat CT/PET 2018  And 2019 showed also no evidence of recurrence    follwing her chemotherapy she ahs been elft with macrocytic anemia with normal b12/folate levels.  She continues to complain of itching. It is generalized and does not seem to respond to moisturizers, Claritin or Benadryl.       ALLERGIES:see med card     MEDICATIONS: See MedCard.     PREVIOUS SURGERIES: Appendectomy in , tonsillectomy at age 19, gastric   bypass with incidental cholecystectomy, bilateral knee replacement in .     SOCIAL HISTORY: She is . She had two natural children, and one adopted   one. The adopted child has . She lives in [place] with her   . She smoked for two years, averaging a pack a day. She stopped 35   years ago or so. Denies any alcohol intake. She worked in [employer].     FAMILY HISTORY: Father  of colon cancer. Younger brother had leukemia that  transform into a lymphoma. Sister had pancreatic cancer          Review of Systems   Constitutional: Negative.    HENT: Negative.    Eyes: Negative.    Respiratory: Negative.  Negative for cough and wheezing.    Cardiovascular: Negative.  Negative for chest pain.   Gastrointestinal: Negative.    Genitourinary: Negative.    Neurological: Negative.    Psychiatric/Behavioral: Negative.        Objective:      Physical Exam   Constitutional: She is oriented to person, place, and time. She appears well-developed. No distress.   HENT:   Head: Normocephalic.   Right Ear: Tympanic membrane, external ear and ear canal normal.   Left Ear: Tympanic membrane, external ear and ear canal normal.   Nose: Nose normal. Right sinus exhibits no maxillary sinus tenderness and no frontal sinus tenderness. Left sinus exhibits no maxillary  sinus tenderness and no frontal sinus tenderness.   Mouth/Throat: Oropharynx is clear and moist and mucous membranes are normal.   Teeth normal.  Gums normal.   Eyes: Pupils are equal, round, and reactive to light. Conjunctivae and lids are normal.   Neck: Normal carotid pulses, no hepatojugular reflux and no JVD present. Carotid bruit is not present. No tracheal deviation present. No thyroid mass and no thyromegaly present.   Cardiovascular: Normal rate, regular rhythm, S1 normal, S2 normal, normal heart sounds and intact distal pulses. Exam reveals no gallop and no friction rub.   No murmur heard.  Carotid exam normal   Pulmonary/Chest: Effort normal and breath sounds normal. No accessory muscle usage. No respiratory distress. She has no wheezes. She has no rales. She exhibits no tenderness.   Abdominal: Soft. Normal appearance. She exhibits no distension and no mass. There is no splenomegaly or hepatomegaly. There is no tenderness. There is no rebound and no guarding.   Musculoskeletal: Normal range of motion. She exhibits no edema or tenderness.        Right hand: Normal.        Left hand: Normal.   Nails normal   Lymphadenopathy:     She has no cervical adenopathy.   Neurological: She is alert and oriented to person, place, and time. No cranial nerve deficit. Coordination normal.   Skin: Skin is warm and dry. No rash noted. She is not diaphoretic. No erythema. No pallor.   Psychiatric: She has a normal mood and affect. Her behavior is normal. Judgment and thought content normal.       Wt Readings from Last 3 Encounters:   11/12/19 65.1 kg (143 lb 8.3 oz)   08/14/19 63.9 kg (140 lb 14 oz)   07/17/19 68.8 kg (151 lb 10.8 oz)     Temp Readings from Last 3 Encounters:   11/12/19 96.8 °F (36 °C)   08/14/19 97 °F (36.1 °C)   07/17/19 98.4 °F (36.9 °C) (Tympanic)     BP Readings from Last 3 Encounters:   11/12/19 115/81   08/14/19 100/61   07/17/19 104/74     Pulse Readings from Last 3 Encounters:   11/12/19 83    08/14/19 88   07/17/19 92       Assessment:       1. 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 Diffuse large B-cell lymphoma of lymph nodes of multiple regions    3. Anxiety    4. Itching        Plan:       She continues to complain of generalised itching. Since this can related to lymphoma activity, we will proceed with a Ct/PET.  Her anemia is chronic with indexes of anemia of chronic disease       She accepts being under a lot of stress. Her  is sick and she is worried about dying before he does  She was offered a referral to clinical psychology and she has agreed  Asked to get the new recombinant shingles vaccine and this season's r flu shot

## 2019-11-13 ENCOUNTER — TELEPHONE (OUTPATIENT)
Dept: HEMATOLOGY/ONCOLOGY | Facility: CLINIC | Age: 79
End: 2019-11-13

## 2019-11-13 NOTE — TELEPHONE ENCOUNTER
""Pt was referred to [facility] from Dr. Matson in need of clinical psychology. SW contacted pt to educate pt on the telepsych process. Pt declined telepsych services and preferred an in-person visit. Sw offered psychiatry services and pt declined until she speaks with Dr. Matson on 11/22/2019 for guidance.  "

## 2019-11-18 ENCOUNTER — TELEPHONE (OUTPATIENT)
Dept: RADIOLOGY | Facility: HOSPITAL | Age: 79
End: 2019-11-18

## 2019-11-20 ENCOUNTER — HOSPITAL ENCOUNTER (OUTPATIENT)
Dept: RADIOLOGY | Facility: HOSPITAL | Age: 79
Discharge: HOME | End: 2019-11-20
Attending: INTERNAL MEDICINE
Payer: MEDICARE

## 2019-11-20 DIAGNOSIS — C85.83 LARGE CELL LYMPHOMA OF INTRA-ABDOMINAL LYMPH NODES: ICD-10-CM

## 2019-11-20 PROCEDURE — 78815 PET IMAGE W/CT SKULL-THIGH: CPT | Mod: TC,HCNC,PS

## 2019-11-20 PROCEDURE — 78815 PET IMAGE W/CT SKULL-THIGH: CPT | Mod: 26,HCNC,PS, | Performed by: RADIOLOGY

## 2019-11-20 PROCEDURE — A9552 F18 FDG: HCPCS | Mod: HCNC

## 2019-11-20 PROCEDURE — 78815 NM PET CT ROUTINE: ICD-10-PCS | Mod: 26,HCNC,PS, | Performed by: RADIOLOGY

## 2019-11-22 ENCOUNTER — OFFICE VISIT (OUTPATIENT)
Dept: HEMATOLOGY/ONCOLOGY | Facility: CLINIC | Age: 79
End: 2019-11-22
Payer: MEDICARE

## 2019-11-22 VITALS
HEIGHT: 64 IN | BODY MASS INDEX: 24.57 KG/M2 | SYSTOLIC BLOOD PRESSURE: 119 MMHG | TEMPERATURE: 98 F | RESPIRATION RATE: 16 BRPM | WEIGHT: 143.94 LBS | HEART RATE: 87 BPM | DIASTOLIC BLOOD PRESSURE: 73 MMHG

## 2019-11-22 DIAGNOSIS — C85.83 LARGE CELL LYMPHOMA OF INTRA-ABDOMINAL LYMPH NODES: Primary | ICD-10-CM

## 2019-11-22 DIAGNOSIS — L29.9 ITCHING: ICD-10-CM

## 2019-11-22 PROCEDURE — 3078F DIAST BP <80 MM HG: CPT | Mod: HCNC,CPTII,S$GLB, | Performed by: INTERNAL MEDICINE

## 2019-11-22 PROCEDURE — 1101F PT FALLS ASSESS-DOCD LE1/YR: CPT | Mod: HCNC,CPTII,S$GLB, | Performed by: INTERNAL MEDICINE

## 2019-11-22 PROCEDURE — 99999 PR PBB SHADOW E&M-EST. PATIENT-LVL V: CPT | Mod: PBBFAC,HCNC,, | Performed by: INTERNAL MEDICINE

## 2019-11-22 PROCEDURE — 99499 UNLISTED E&M SERVICE: CPT | Mod: HCNC,S$GLB,, | Performed by: INTERNAL MEDICINE

## 2019-11-22 PROCEDURE — 1125F AMNT PAIN NOTED PAIN PRSNT: CPT | Mod: HCNC,S$GLB,, | Performed by: INTERNAL MEDICINE

## 2019-11-22 PROCEDURE — 1125F PR PAIN SEVERITY QUANTIFIED, PAIN PRESENT: ICD-10-PCS | Mod: HCNC,S$GLB,, | Performed by: INTERNAL MEDICINE

## 2019-11-22 PROCEDURE — 99214 OFFICE O/P EST MOD 30 MIN: CPT | Mod: HCNC,S$GLB,, | Performed by: INTERNAL MEDICINE

## 2019-11-22 PROCEDURE — 99999 PR PBB SHADOW E&M-EST. PATIENT-LVL V: ICD-10-PCS | Mod: PBBFAC,HCNC,, | Performed by: INTERNAL MEDICINE

## 2019-11-22 PROCEDURE — 3078F PR MOST RECENT DIASTOLIC BLOOD PRESSURE < 80 MM HG: ICD-10-PCS | Mod: HCNC,CPTII,S$GLB, | Performed by: INTERNAL MEDICINE

## 2019-11-22 PROCEDURE — 3074F SYST BP LT 130 MM HG: CPT | Mod: HCNC,CPTII,S$GLB, | Performed by: INTERNAL MEDICINE

## 2019-11-22 PROCEDURE — 1159F MED LIST DOCD IN RCRD: CPT | Mod: HCNC,S$GLB,, | Performed by: INTERNAL MEDICINE

## 2019-11-22 PROCEDURE — 1159F PR MEDICATION LIST DOCUMENTED IN MEDICAL RECORD: ICD-10-PCS | Mod: HCNC,S$GLB,, | Performed by: INTERNAL MEDICINE

## 2019-11-22 PROCEDURE — 99499 RISK ADDL DX/OHS AUDIT: ICD-10-PCS | Mod: HCNC,S$GLB,, | Performed by: INTERNAL MEDICINE

## 2019-11-22 PROCEDURE — 99214 PR OFFICE/OUTPT VISIT, EST, LEVL IV, 30-39 MIN: ICD-10-PCS | Mod: HCNC,S$GLB,, | Performed by: INTERNAL MEDICINE

## 2019-11-22 PROCEDURE — 3074F PR MOST RECENT SYSTOLIC BLOOD PRESSURE < 130 MM HG: ICD-10-PCS | Mod: HCNC,CPTII,S$GLB, | Performed by: INTERNAL MEDICINE

## 2019-11-22 PROCEDURE — 1101F PR PT FALLS ASSESS DOC 0-1 FALLS W/OUT INJ PAST YR: ICD-10-PCS | Mod: HCNC,CPTII,S$GLB, | Performed by: INTERNAL MEDICINE

## 2019-11-22 NOTE — PROGRESS NOTES
Subjective:       Patient ID: Violet Swenson is a 79 y.o. female.    Chief Complaint: Lymphoma    HPI This 78 year old  lady  comes for follow up of her th diffuse large cell lymphoma.   A CT of   the abdomen done on 04/05/2017, done because of abdominal pain ,  was reported as showing a 6.9 x 7.0 x 8.4 cm soft  tissue mass in the right lower quadrant in the terminal ileum abutting the   cecum. There was adjacent conglomerate adenopathy in the right lower quadrant   mesentery.     The patient had a colonoscopy that showed a lesion in the terminal ileum.     She underwent a right hemicolectomy and terminal ileum removal. 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  She had a Ct/PET that showed evidence of surgery and some non specific findings, but no evidence of active disease elsewhere.  An ECHO showed normal cardiac ejection fraction, as well as a MUGA.  She was seen cardiology and cleared for ADRIAMYCIN administration.  She was negative for Hep B/C and HIV  Bone marrow was negative.     The patient started  R-CHOP. Treatment  She tolerated the treatment with some minor difficulties. After 3 cycles, she had a repeat CT/PET  There was no activity in the abdomen.  There was development of ground glass opacities/infiltrates in both lungs which were hypermetabolic although the SUV was not reported.  We discussed the imaging studies with Radiology and Pulmonary.  Dr Corral of the Pulmonary Department favored the findings to be due to pulmonary congestion.  Her troponin was  normal, but her BNP was markedly elevated at 1,103.  She was started on Lasix by dr Corral and asked to have a  Ct in few weeks  The patient   had evaluation by Cardiology ( dr Gil).It was felt had developed CHF., with a decrease in her ejection traction to 47%., elevated BNP and imaging studies.  The patient indicated her leg  edema and SOB   improved on lasix. Repeat PET showed  clearance of all the infiltrates  Since, she has had a complete work including cardiac catheterization with negative findings.  A She comes for follow up. She had a CT/PET in 2018 that shows no evidence of recurrence. Repeat CT/PET 2018  And 2019 showed also no evidence of recurrence     follwing her chemotherapy she has had a macrocytic anemia with normal b12/folate levels.  She continues to complain of itching. It is generalized and does not seem to respond to moisturizers, Claritin or Benadryl.  Because of it and the potential association with a recurrence of the ;ympjhoma , she was asked to have a CT/PET done before the original planned date.   The Ct/PET done 2019 showed no evidence of recurrnece,.  She comes to discuss what to do going forward        ALLERGIES:see med card     MEDICATIONS: See MedCard.     PREVIOUS SURGERIES: Appendectomy in , tonsillectomy at age 19, gastric   bypass with incidental cholecystectomy, bilateral knee replacement in .     SOCIAL HISTORY: She is . She had two natural children, and one adopted   one. The adopted child has . She lives in [place] with her   . She smoked for two years, averaging a pack a day. She stopped 35   years ago or so. Denies any alcohol intake. She worked in accounting.     FAMILY HISTORY: Father  of colon cancer. Younger brother had leukemia that  transform into a lymphoma. Sister had pancreatic cancer          Review of Systems   Constitutional: Negative.    HENT: Negative.    Eyes: Negative.    Respiratory: Negative.  Negative for cough and wheezing.    Cardiovascular: Negative.  Negative for chest pain.   Gastrointestinal: Negative.    Genitourinary: Negative.    Neurological: Negative.    Psychiatric/Behavioral: Negative.        Objective:      Physical Exam   Constitutional: She is oriented to person, place, and time. She appears well-developed. No distress.   HENT:   Head: Normocephalic.   Right Ear:  Tympanic membrane, external ear and ear canal normal.   Left Ear: Tympanic membrane, external ear and ear canal normal.   Nose: Nose normal. Right sinus exhibits no maxillary sinus tenderness and no frontal sinus tenderness. Left sinus exhibits no maxillary sinus tenderness and no frontal sinus tenderness.   Mouth/Throat: Oropharynx is clear and moist and mucous membranes are normal.   Teeth normal.  Gums normal.   Eyes: Pupils are equal, round, and reactive to light. Conjunctivae and lids are normal.   Neck: Normal carotid pulses, no hepatojugular reflux and no JVD present. Carotid bruit is not present. No tracheal deviation present. No thyroid mass and no thyromegaly present.   Cardiovascular: Normal rate, regular rhythm, S1 normal, S2 normal, normal heart sounds and intact distal pulses. Exam reveals no gallop and no friction rub.   No murmur heard.  Carotid exam normal   Pulmonary/Chest: Effort normal and breath sounds normal. No accessory muscle usage. No respiratory distress. She has no wheezes. She has no rales. She exhibits no tenderness.   Abdominal: Soft. Normal appearance. She exhibits no distension and no mass. There is no splenomegaly or hepatomegaly. There is no tenderness. There is no rebound and no guarding.   Musculoskeletal: Normal range of motion. She exhibits no edema or tenderness.        Right hand: Normal.        Left hand: Normal.   Nails normal   Lymphadenopathy:     She has no cervical adenopathy.   Neurological: She is alert and oriented to person, place, and time. No cranial nerve deficit. Coordination normal.   Skin: Skin is warm and dry. No rash noted. She is not diaphoretic. No erythema. No pallor.   Psychiatric: She has a normal mood and affect. Her behavior is normal. Judgment and thought content normal.       Wt Readings from Last 3 Encounters:   11/22/19 65.3 kg (143 lb 15.4 oz)   11/12/19 65.1 kg (143 lb 8.3 oz)   08/14/19 63.9 kg (140 lb 14 oz)     Temp Readings from Last 3  Encounters:   11/22/19 98.2 °F (36.8 °C)   11/12/19 96.8 °F (36 °C)   08/14/19 97 °F (36.1 °C)     BP Readings from Last 3 Encounters:   11/22/19 119/73   11/12/19 115/81   08/14/19 100/61     Pulse Readings from Last 3 Encounters:   11/22/19 87   11/12/19 83   08/14/19 88   ls    Assessment:       1. 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 Itching        Plan:       Lab Results   Component Value Date    WBC 6.48 11/07/2019    HGB 10.3 (L) 11/07/2019    HCT 32.8 (L) 11/07/2019     (H) 11/07/2019     11/07/2019       Lab Results   Component Value Date    CREATININE 1.3 11/07/2019     Lab Results   Component Value Date    ALT 33 11/07/2019    AST 43 (H) 11/07/2019    ALKPHOS 63 11/07/2019    BILITOT 0.4 11/07/2019       CT/PET revierwed. No evidence of active disease     She was asked to see dermatology.  See me back in 3 months with a cbc/cmp

## 2019-12-02 ENCOUNTER — OFFICE VISIT (OUTPATIENT)
Dept: DERMATOLOGY | Facility: CLINIC | Age: 79
End: 2019-12-02
Payer: MEDICARE

## 2019-12-02 DIAGNOSIS — L29.9 PRURITUS: Primary | ICD-10-CM

## 2019-12-02 DIAGNOSIS — L82.1 SEBORRHEIC KERATOSES: ICD-10-CM

## 2019-12-02 PROCEDURE — 99213 PR OFFICE/OUTPT VISIT, EST, LEVL III, 20-29 MIN: ICD-10-PCS | Mod: HCNC,S$GLB,, | Performed by: PHYSICIAN ASSISTANT

## 2019-12-02 PROCEDURE — 1159F MED LIST DOCD IN RCRD: CPT | Mod: HCNC,S$GLB,, | Performed by: PHYSICIAN ASSISTANT

## 2019-12-02 PROCEDURE — 99213 OFFICE O/P EST LOW 20 MIN: CPT | Mod: HCNC,S$GLB,, | Performed by: PHYSICIAN ASSISTANT

## 2019-12-02 PROCEDURE — 1159F PR MEDICATION LIST DOCUMENTED IN MEDICAL RECORD: ICD-10-PCS | Mod: HCNC,S$GLB,, | Performed by: PHYSICIAN ASSISTANT

## 2019-12-02 PROCEDURE — 1100F PR PT FALLS ASSESS DOC 2+ FALLS/FALL W/INJURY/YR: ICD-10-PCS | Mod: HCNC,CPTII,S$GLB, | Performed by: PHYSICIAN ASSISTANT

## 2019-12-02 PROCEDURE — 99999 PR PBB SHADOW E&M-EST. PATIENT-LVL II: ICD-10-PCS | Mod: PBBFAC,HCNC,, | Performed by: PHYSICIAN ASSISTANT

## 2019-12-02 PROCEDURE — 3288F FALL RISK ASSESSMENT DOCD: CPT | Mod: HCNC,CPTII,S$GLB, | Performed by: PHYSICIAN ASSISTANT

## 2019-12-02 PROCEDURE — 1100F PTFALLS ASSESS-DOCD GE2>/YR: CPT | Mod: HCNC,CPTII,S$GLB, | Performed by: PHYSICIAN ASSISTANT

## 2019-12-02 PROCEDURE — 3288F PR FALLS RISK ASSESSMENT DOCUMENTED: ICD-10-PCS | Mod: HCNC,CPTII,S$GLB, | Performed by: PHYSICIAN ASSISTANT

## 2019-12-02 PROCEDURE — 99999 PR PBB SHADOW E&M-EST. PATIENT-LVL II: CPT | Mod: PBBFAC,HCNC,, | Performed by: PHYSICIAN ASSISTANT

## 2019-12-02 RX ORDER — TRIAMCINOLONE ACETONIDE 0.25 MG/G
CREAM TOPICAL 2 TIMES DAILY
Qty: 80 G | Refills: 0 | Status: SHIPPED | OUTPATIENT
Start: 2019-12-02 | End: 2021-11-05

## 2019-12-02 NOTE — PROGRESS NOTES
"  Subjective:       Patient ID:  Violet Swenson is a 79 y.o. female who presents for   Chief Complaint   Patient presents with    Itching     Hx of SK of right inferior forehead (near eyebrow), last seen 6/21/18. Here today for new c/o.    History of Present Illness: The patient presents with chief complaint of itching. She notes increased stress and nerves associated with lymphoma diagnosis. Recently told "no sign of cancer" by Dr. Matson last week (11/22/19) and she notes some improvement in the itching.  Location: upper body  Duration: several months  Signs/Symptoms: intense itching, denies amie rash or lesions    Prior treatments: benadryl prn, hydrocortisone, otc lotions and cream    PMHX; large cell lymphoma, s/p 3 chemo treatments (stopped due to heart complications), followed by Dr. Matson. +CKD, stage 3.        Review of Systems   Constitutional: Negative for fever and chills.   Gastrointestinal: Negative for nausea and vomiting.   Skin: Positive for itching and activity-related sunscreen use. Negative for rash, dry skin, daily sunscreen use and recent sunburn.   Hematologic/Lymphatic: Does not bruise/bleed easily.        Objective:    Physical Exam   Constitutional: She appears well-developed and well-nourished. No distress.   Neurological: She is alert and oriented to person, place, and time. She is not disoriented.   Psychiatric: She has a normal mood and affect.   Skin:   Areas Examined (abnormalities noted in diagram):   Scalp / Hair Palpated and Inspected  Head / Face Inspection Performed  Neck Inspection Performed  Chest / Axilla Inspection Performed  Back Inspection Performed  RUE Inspected  LUE Inspection Performed                   Diagram Legend     Erythematous scaling macule/papule c/w actinic keratosis       Vascular papule c/w angioma      Pigmented verrucoid papule/plaque c/w seborrheic keratosis      Yellow umbilicated papule c/w sebaceous hyperplasia      Irregularly shaped " tan macule c/w lentigo     1-2 mm smooth white papules consistent with Milia      Movable subcutaneous cyst with punctum c/w epidermal inclusion cyst      Subcutaneous movable cyst c/w pilar cyst      Firm pink to brown papule c/w dermatofibroma      Pedunculated fleshy papule(s) c/w skin tag(s)      Evenly pigmented macule c/w junctional nevus     Mildly variegated pigmented, slightly irregular-bordered macule c/w mildly atypical nevus      Flesh colored to evenly pigmented papule c/w intradermal nevus       Pink pearly papule/plaque c/w basal cell carcinoma      Erythematous hyperkeratotic cursted plaque c/w SCC      Surgical scar with no sign of skin cancer recurrence      Open and closed comedones      Inflammatory papules and pustules      Verrucoid papule consistent consistent with wart     Erythematous eczematous patches and plaques     Dystrophic onycholytic nail with subungual debris c/w onychomycosis     Umbilicated papule    Erythematous-base heme-crusted tan verrucoid plaque consistent with inflamed seborrheic keratosis     Erythematous Silvery Scaling Plaque c/w Psoriasis     See annotation      Assessment / Plan:        Pruritus  -     triamcinolone acetonide 0.025% (KENALOG) 0.025 % cream; Apply topically 2 (two) times daily. PRN itching.  Dispense: 80 g; Refill: 0    Reviewed CMP and CBC from 11/7/19. Will f/u labs from today.  Discussed dx and potential tx. Reviewed sensitive skin care regimen.  Trial of TAC cream. Would consider Sarna in future vs. Pramasone.     Seborrheic keratoses  Reassurance given.  Lesions are benign.  Recheck SK of back, if not improved with trial of TAC, will reconsider biospy in future. Reviewed path results from scouting biopsy of Right inferior forehead and c/w SK, monitor.         Follow up in about 4 weeks (around 12/30/2019) for pruritus.

## 2019-12-17 ENCOUNTER — PES CALL (OUTPATIENT)
Dept: ADMINISTRATIVE | Facility: CLINIC | Age: 79
End: 2019-12-17

## 2019-12-27 DIAGNOSIS — I25.10 CORONARY ARTERY DISEASE INVOLVING NATIVE CORONARY ARTERY OF NATIVE HEART WITHOUT ANGINA PECTORIS: Chronic | ICD-10-CM

## 2019-12-27 DIAGNOSIS — I25.10 CORONARY ARTERY DISEASE WITHOUT ANGINA PECTORIS, UNSPECIFIED VESSEL OR LESION TYPE, UNSPECIFIED WHETHER NATIVE OR TRANSPLANTED HEART: ICD-10-CM

## 2019-12-27 RX ORDER — CLOPIDOGREL BISULFATE 75 MG/1
TABLET ORAL
Qty: 90 TABLET | Refills: 3 | Status: ON HOLD | OUTPATIENT
Start: 2019-12-27 | End: 2020-11-17 | Stop reason: SDUPTHER

## 2019-12-27 RX ORDER — PRAVASTATIN SODIUM 40 MG/1
TABLET ORAL
Qty: 90 TABLET | Refills: 3 | Status: SHIPPED | OUTPATIENT
Start: 2019-12-27 | End: 2020-11-24

## 2020-01-06 ENCOUNTER — OFFICE VISIT (OUTPATIENT)
Dept: DERMATOLOGY | Facility: CLINIC | Age: 80
End: 2020-01-06
Payer: MEDICARE

## 2020-01-06 ENCOUNTER — OFFICE VISIT (OUTPATIENT)
Dept: INTERNAL MEDICINE | Facility: CLINIC | Age: 80
End: 2020-01-06
Payer: MEDICARE

## 2020-01-06 VITALS
WEIGHT: 144.06 LBS | DIASTOLIC BLOOD PRESSURE: 62 MMHG | BODY MASS INDEX: 24.73 KG/M2 | OXYGEN SATURATION: 95 % | HEART RATE: 100 BPM | TEMPERATURE: 97 F | SYSTOLIC BLOOD PRESSURE: 92 MMHG

## 2020-01-06 DIAGNOSIS — F41.9 ANXIETY: ICD-10-CM

## 2020-01-06 DIAGNOSIS — I10 ESSENTIAL HYPERTENSION: Chronic | ICD-10-CM

## 2020-01-06 DIAGNOSIS — L29.9 SCALP PRURITUS: Primary | ICD-10-CM

## 2020-01-06 DIAGNOSIS — T45.1X5A CHEMOTHERAPY-INDUCED NEUROPATHY: Chronic | ICD-10-CM

## 2020-01-06 DIAGNOSIS — L29.9 PRURITUS: ICD-10-CM

## 2020-01-06 DIAGNOSIS — G62.0 CHEMOTHERAPY-INDUCED NEUROPATHY: Chronic | ICD-10-CM

## 2020-01-06 DIAGNOSIS — M25.512 ACUTE PAIN OF LEFT SHOULDER: Primary | ICD-10-CM

## 2020-01-06 DIAGNOSIS — L81.4 LENTIGINES: ICD-10-CM

## 2020-01-06 DIAGNOSIS — R29.6 FREQUENT FALLS: ICD-10-CM

## 2020-01-06 DIAGNOSIS — L57.8 ACTINIC ELASTOSIS: ICD-10-CM

## 2020-01-06 PROCEDURE — 1100F PR PT FALLS ASSESS DOC 2+ FALLS/FALL W/INJURY/YR: ICD-10-PCS | Mod: HCNC,CPTII,S$GLB, | Performed by: NURSE PRACTITIONER

## 2020-01-06 PROCEDURE — 3288F PR FALLS RISK ASSESSMENT DOCUMENTED: ICD-10-PCS | Mod: HCNC,CPTII,S$GLB, | Performed by: NURSE PRACTITIONER

## 2020-01-06 PROCEDURE — 1100F PTFALLS ASSESS-DOCD GE2>/YR: CPT | Mod: HCNC,CPTII,S$GLB, | Performed by: NURSE PRACTITIONER

## 2020-01-06 PROCEDURE — 1101F PR PT FALLS ASSESS DOC 0-1 FALLS W/OUT INJ PAST YR: ICD-10-PCS | Mod: HCNC,CPTII,S$GLB, | Performed by: PHYSICIAN ASSISTANT

## 2020-01-06 PROCEDURE — 99999 PR PBB SHADOW E&M-EST. PATIENT-LVL II: CPT | Mod: PBBFAC,HCNC,, | Performed by: PHYSICIAN ASSISTANT

## 2020-01-06 PROCEDURE — 99999 PR PBB SHADOW E&M-EST. PATIENT-LVL II: ICD-10-PCS | Mod: PBBFAC,HCNC,, | Performed by: PHYSICIAN ASSISTANT

## 2020-01-06 PROCEDURE — 3078F DIAST BP <80 MM HG: CPT | Mod: HCNC,CPTII,S$GLB, | Performed by: NURSE PRACTITIONER

## 2020-01-06 PROCEDURE — 1159F MED LIST DOCD IN RCRD: CPT | Mod: HCNC,S$GLB,, | Performed by: PHYSICIAN ASSISTANT

## 2020-01-06 PROCEDURE — 99213 PR OFFICE/OUTPT VISIT, EST, LEVL III, 20-29 MIN: ICD-10-PCS | Mod: HCNC,S$GLB,, | Performed by: PHYSICIAN ASSISTANT

## 2020-01-06 PROCEDURE — 3078F PR MOST RECENT DIASTOLIC BLOOD PRESSURE < 80 MM HG: ICD-10-PCS | Mod: HCNC,CPTII,S$GLB, | Performed by: NURSE PRACTITIONER

## 2020-01-06 PROCEDURE — 1159F PR MEDICATION LIST DOCUMENTED IN MEDICAL RECORD: ICD-10-PCS | Mod: HCNC,S$GLB,, | Performed by: PHYSICIAN ASSISTANT

## 2020-01-06 PROCEDURE — 1159F MED LIST DOCD IN RCRD: CPT | Mod: HCNC,S$GLB,, | Performed by: NURSE PRACTITIONER

## 2020-01-06 PROCEDURE — 99214 OFFICE O/P EST MOD 30 MIN: CPT | Mod: HCNC,S$GLB,, | Performed by: NURSE PRACTITIONER

## 2020-01-06 PROCEDURE — 1159F PR MEDICATION LIST DOCUMENTED IN MEDICAL RECORD: ICD-10-PCS | Mod: HCNC,S$GLB,, | Performed by: NURSE PRACTITIONER

## 2020-01-06 PROCEDURE — 3074F PR MOST RECENT SYSTOLIC BLOOD PRESSURE < 130 MM HG: ICD-10-PCS | Mod: HCNC,CPTII,S$GLB, | Performed by: NURSE PRACTITIONER

## 2020-01-06 PROCEDURE — 99999 PR PBB SHADOW E&M-EST. PATIENT-LVL III: ICD-10-PCS | Mod: PBBFAC,HCNC,, | Performed by: NURSE PRACTITIONER

## 2020-01-06 PROCEDURE — 3074F SYST BP LT 130 MM HG: CPT | Mod: HCNC,CPTII,S$GLB, | Performed by: NURSE PRACTITIONER

## 2020-01-06 PROCEDURE — 99214 PR OFFICE/OUTPT VISIT, EST, LEVL IV, 30-39 MIN: ICD-10-PCS | Mod: HCNC,S$GLB,, | Performed by: NURSE PRACTITIONER

## 2020-01-06 PROCEDURE — 99999 PR PBB SHADOW E&M-EST. PATIENT-LVL III: CPT | Mod: PBBFAC,HCNC,, | Performed by: NURSE PRACTITIONER

## 2020-01-06 PROCEDURE — 99213 OFFICE O/P EST LOW 20 MIN: CPT | Mod: HCNC,S$GLB,, | Performed by: PHYSICIAN ASSISTANT

## 2020-01-06 PROCEDURE — 3288F FALL RISK ASSESSMENT DOCD: CPT | Mod: HCNC,CPTII,S$GLB, | Performed by: NURSE PRACTITIONER

## 2020-01-06 PROCEDURE — 1125F AMNT PAIN NOTED PAIN PRSNT: CPT | Mod: HCNC,S$GLB,, | Performed by: NURSE PRACTITIONER

## 2020-01-06 PROCEDURE — 1101F PT FALLS ASSESS-DOCD LE1/YR: CPT | Mod: HCNC,CPTII,S$GLB, | Performed by: PHYSICIAN ASSISTANT

## 2020-01-06 PROCEDURE — 1125F PR PAIN SEVERITY QUANTIFIED, PAIN PRESENT: ICD-10-PCS | Mod: HCNC,S$GLB,, | Performed by: NURSE PRACTITIONER

## 2020-01-06 RX ORDER — TRETINOIN 0.25 MG/G
CREAM TOPICAL
Qty: 20 G | Refills: 6 | Status: SHIPPED | OUTPATIENT
Start: 2020-01-06 | End: 2021-01-05

## 2020-01-06 RX ORDER — FLUOCINOLONE ACETONIDE 0.1 MG/ML
SOLUTION TOPICAL
Qty: 60 ML | Refills: 1 | Status: SHIPPED | OUTPATIENT
Start: 2020-01-06 | End: 2021-11-05

## 2020-01-06 RX ORDER — BUSPIRONE HYDROCHLORIDE 5 MG/1
5 TABLET ORAL 2 TIMES DAILY PRN
Qty: 60 TABLET | Refills: 0 | Status: SHIPPED | OUTPATIENT
Start: 2020-01-06 | End: 2020-01-28

## 2020-01-06 NOTE — PROGRESS NOTES
Subjective:       Patient ID:  Violet Swenson is a 79 y.o. female who presents for   Chief Complaint   Patient presents with    Follow-up     1 month, itching, body is better, head is still itching      Hx of SK of right inferior forehead (near eyebrow, s/p biopsy 6/21/18), general pruritus, and SKs, last seen 12/2/19.  Current tx for pruritus: Dove sensitive skin soap and Eucerin lotion.  Previous tx: TAC 0.025% cream bid prn. + Overall improvement.      C/o scalp pruritus today. Duration several months. Denies any aggravating or relieving factors. Recently tried changing to Dove Shampoo, but has not improved.      PMHX:  Large cell lymphoma, s/p 3 chemo tx (stopped due to heart complications), followed by Dr. Matson. +CKD, stage 3.      Review of Systems   Constitutional: Negative for fever and chills.   Gastrointestinal: Negative for nausea and vomiting.   Skin: Positive for itching and activity-related sunscreen use. Negative for rash, dry skin, daily sunscreen use and recent sunburn.   Hematologic/Lymphatic: Does not bruise/bleed easily.        Objective:    Physical Exam   Constitutional: She appears well-developed and well-nourished. No distress.   Neurological: She is alert and oriented to person, place, and time. She is not disoriented.   Psychiatric: She has a normal mood and affect.   Skin:   Areas Examined (abnormalities noted in diagram):   Scalp / Hair Palpated and Inspected  Head / Face Inspection Performed  Neck Inspection Performed  Chest / Axilla Inspection Performed  Back Inspection Performed  RUE Inspected  LUE Inspection Performed              Diagram Legend     Erythematous scaling macule/papule c/w actinic keratosis       Vascular papule c/w angioma      Pigmented verrucoid papule/plaque c/w seborrheic keratosis      Yellow umbilicated papule c/w sebaceous hyperplasia      Irregularly shaped tan macule c/w lentigo     1-2 mm smooth white papules consistent with Milia       Movable subcutaneous cyst with punctum c/w epidermal inclusion cyst      Subcutaneous movable cyst c/w pilar cyst      Firm pink to brown papule c/w dermatofibroma      Pedunculated fleshy papule(s) c/w skin tag(s)      Evenly pigmented macule c/w junctional nevus     Mildly variegated pigmented, slightly irregular-bordered macule c/w mildly atypical nevus      Flesh colored to evenly pigmented papule c/w intradermal nevus       Pink pearly papule/plaque c/w basal cell carcinoma      Erythematous hyperkeratotic cursted plaque c/w SCC      Surgical scar with no sign of skin cancer recurrence      Open and closed comedones      Inflammatory papules and pustules      Verrucoid papule consistent consistent with wart     Erythematous eczematous patches and plaques     Dystrophic onycholytic nail with subungual debris c/w onychomycosis     Umbilicated papule    Erythematous-base heme-crusted tan verrucoid plaque consistent with inflamed seborrheic keratosis     Erythematous Silvery Scaling Plaque c/w Psoriasis     See annotation      Assessment / Plan:        Scalp pruritus  -     fluocinolone (SYNALAR) 0.01 % external solution; AAA of scalp twice a day prn itching.  Dispense: 60 mL; Refill: 1  Trial of fluocinolone 0.01% solution bid. Trial of Vanicream Shampoo and Conditioner (samples/coupon provided).    Pruritus  Improved of upper body with sensitive skin care regimen. Will continue Dove sensitive skin soap and Eucerin. Reserve TAC prn flares.    Lentigines  -     tretinoin (RETIN-A) 0.025 % cream; Apply a pea-sized amount to the entire face at bedtime.  Use every third night and increase as tolerated to nightly.  Dispense: 20 g; Refill: 6  Actinic elastosis  -     tretinoin (RETIN-A) 0.025 % cream; Apply a pea-sized amount to the entire face at bedtime.  Use every third night and increase as tolerated to nightly.  Dispense: 20 g; Refill: 6  Encouraged daily spf 30. Monitor for change in size/shape/color. Trial of  tretinoin 0.025% qhs as tolerated.          Follow up in about 3 months (around 4/6/2020).

## 2020-01-06 NOTE — PROGRESS NOTES
Violet Mcnally [facility] [place] 79 y.[facility]     Chief Complaint:  Chief Complaint   Patient presents with    Medication Refill     had a fall and has a bruise on right arm       History of Present Illness:  Pt is new to provider, but established in practice and presents requesting refill on xanax which she has taken in the recent past due to anxiety.  Reports lots of family turmoil and she cannot cope.      Also reports trip and fall a few days ago in her garage. On her way down hit her UA on door frame/shelf.  Since has had right upper arm pain and bruising, no swelling, numbness/tingling.  Did not hit head, no LOC.  Reports falling frequently, attributes to her neuropathy. Uses rolator.     Denies urinary changes or recent illnesses   No fever/chills    Exam:  Review of Systems   Constitutional: Negative for chills, diaphoresis, fever, malaise/fatigue and weight loss.   HENT: Negative for hearing loss.    Eyes: Negative for blurred vision.   Respiratory: Negative for cough and shortness of breath.    Cardiovascular: Negative for chest pain, palpitations and leg swelling.   Gastrointestinal: Negative for abdominal pain, diarrhea, nausea and vomiting.   Genitourinary: Negative for dysuria and hematuria.   Musculoskeletal: Positive for falls (frequently ) and myalgias (RUBA).   Skin: Negative for rash.   Neurological: Positive for tingling (chronic neuropathy ). Negative for dizziness, tremors, seizures, loss of consciousness and headaches.   Psychiatric/Behavioral: Positive for depression. The patient is nervous/anxious and has insomnia.      Physical Exam   Constitutional: She is oriented to person, place, and time. She appears well-developed and well-nourished. She is cooperative.  Non-toxic appearance. She does not appear ill. No distress.   HENT:   Head: Normocephalic and atraumatic.   Right Ear: External ear normal.   Left Ear: External ear normal.   Nose: Nose normal.   Mouth/Throat: Oropharynx is clear and  moist. No oropharyngeal exudate.   Eyes: Pupils are equal, round, and reactive to light. Conjunctivae and EOM are normal. Right eye exhibits no discharge. Left eye exhibits no discharge. No scleral icterus.   Neck: Normal range of motion. No tracheal deviation present.   Cardiovascular: Normal rate and regular rhythm.   Pulmonary/Chest: Effort normal and breath sounds normal. No stridor. No respiratory distress. She has no wheezes. She has no rales. She exhibits no tenderness.   Abdominal: Soft. Bowel sounds are normal. She exhibits no distension and no mass. There is no tenderness. There is no rebound and no guarding. No hernia.   Musculoskeletal: She exhibits tenderness (Right upper arm). She exhibits no edema.        Right shoulder: She exhibits decreased range of motion and tenderness. She exhibits no swelling and no effusion.   Neurological: She is alert and oriented to person, place, and time.   Skin: Skin is warm, dry and intact. Bruising ( right upper arm) noted. No rash noted. She is not diaphoretic. No erythema. No pallor.   Psychiatric: Her speech is normal. Judgment and thought content normal. Her mood appears anxious. She is agitated. Cognition and memory are normal. She exhibits a depressed mood.   Nursing note and vitals reviewed.      Most Recent Laboratory Results Reviewed ({Yes)  Lab Results   Component Value Date    WBC 7.80 12/02/2019    HGB 10.3 (L) 12/02/2019    HCT 34.4 (L) 12/02/2019     12/02/2019    CHOL 92 (L) 07/19/2018    TRIG 53 07/19/2018    HDL 55 07/19/2018    ALT 33 11/07/2019    AST 43 (H) 11/07/2019     12/02/2019    K 4.2 12/02/2019     (H) 12/02/2019    CREATININE 1.5 (H) 12/02/2019    BUN 28 (H) 12/02/2019    CO2 23 12/02/2019    TSH 5.071 (H) 02/25/2019    INR 1.0 11/30/2018       Assessment     ICD-10-CM ICD-9-CM   1. Acute pain of left shoulder M25.512 719.41   2. Anxiety F41.9 300.00   3. Frequent falls R29.6 V15.88   4. Chemotherapy-induced neuropathy  G62.0 357.6    T45.1X5A E933.1   5. Essential hypertension I10 401.9        Plan   Acute pain of left shoulder  -     X-ray Shoulder 2 or More Views Right; Future; Expected date: 01/06/2020  -     Ambulatory referral/consult to Orthopedics; Future; Expected date: 01/06/2020    Anxiety  -     busPIRone (BUSPAR) 5 MG Tab; Take 1 tablet (5 mg total) by mouth 2 (two) times daily as needed.  Dispense: 60 tablet; Refill: 0    Frequent falls  - educated patient that due to this it is not advised to refill Xanax     Neuropathy  - contributing to falls    Hypertension  BP today mildly low, asymptomatic  potentially overcorrected    Will address at follow-up if continues  Follow up in about 4 weeks (around 2/3/2020), or if symptoms worsen or fail to improve.  Future Appointments     Date Provider Specialty Appt Notes    1/13/2020 Guy Mireles, FUNMILAYO Ophthalmology ROUTINE EYE EXAM     1/21/2020 Pamela Farr PA-C Orthopedics Acute pain of left shoulder    2/12/2020 Naty Dsouza NP Internal Medicine 1 month f/u    2/24/2020 Robert Gomez MD Nephrology EP/LABS/SF    2/26/2020  Lab     2/26/2020 Da Matson MD Hematology and Oncology 3 mo fu//tgv    3/9/2020 Dilshad Rogers MD Rheumatology Follow Up     3/12/2020 Nader Gil MD Cardiology 6 month follow-up    3/26/2020 Marti Coronel MD Internal Medicine annual

## 2020-01-08 ENCOUNTER — HOSPITAL ENCOUNTER (OUTPATIENT)
Dept: RADIOLOGY | Facility: HOSPITAL | Age: 80
Discharge: HOME | End: 2020-01-08
Attending: NURSE PRACTITIONER
Payer: MEDICARE

## 2020-01-08 DIAGNOSIS — M25.512 ACUTE PAIN OF LEFT SHOULDER: ICD-10-CM

## 2020-01-08 PROCEDURE — 73030 XR SHOULDER COMPLETE 2 OR MORE VIEWS RIGHT: ICD-10-PCS | Mod: 26,HCNC,RT, | Performed by: RADIOLOGY

## 2020-01-08 PROCEDURE — 73030 X-RAY EXAM OF SHOULDER: CPT | Mod: 26,HCNC,RT, | Performed by: RADIOLOGY

## 2020-01-08 PROCEDURE — 73030 X-RAY EXAM OF SHOULDER: CPT | Mod: TC,HCNC,RT

## 2020-01-08 RX ORDER — FUROSEMIDE 40 MG/1
TABLET ORAL
Qty: 30 TABLET | Refills: 6 | Status: SHIPPED | OUTPATIENT
Start: 2020-01-08 | End: 2020-07-07

## 2020-01-09 DIAGNOSIS — F41.9 ANXIETY: ICD-10-CM

## 2020-01-09 RX ORDER — BUSPIRONE HYDROCHLORIDE 5 MG/1
TABLET ORAL
Qty: 60 TABLET | Refills: 0 | OUTPATIENT
Start: 2020-01-09

## 2020-01-12 NOTE — PROGRESS NOTES
Patient, Violet Swenson (MRN #[id number]), presented with a recent Estimated Glumerular Filtration Rate (EGFR) between 30 and 45 consistent with the definition of chronic kidney disease stage 3 - moderate (ICD10 - N18.3).    eGFR if non    Date Value Ref Range Status   12/02/2019 33.1 (A) >60 mL/min/1.73 m^2 Final     Comment:     Calculation used to obtain the estimated glomerular filtration  rate (eGFR) is the CKD-EPI equation.          The patient's chronic kidney disease stage 3 was monitored, evaluated, addressed and/or treated. This addendum to the medical record is made on 01/11/2020.

## 2020-01-13 ENCOUNTER — OFFICE VISIT (OUTPATIENT)
Dept: OPHTHALMOLOGY | Facility: CLINIC | Age: 80
End: 2020-01-13
Payer: MEDICARE

## 2020-01-13 DIAGNOSIS — H50.00 ESOTROPIA: Primary | ICD-10-CM

## 2020-01-13 DIAGNOSIS — H25.13 CATARACT, NUCLEAR SCLEROTIC SENILE, BILATERAL: ICD-10-CM

## 2020-01-13 DIAGNOSIS — H52.4 BILATERAL PRESBYOPIA: ICD-10-CM

## 2020-01-13 DIAGNOSIS — H04.123 DRY EYES, BILATERAL: ICD-10-CM

## 2020-01-13 PROCEDURE — 92015 DETERMINE REFRACTIVE STATE: CPT | Mod: HCNC,S$GLB,, | Performed by: OPTOMETRIST

## 2020-01-13 PROCEDURE — 92014 COMPRE OPH EXAM EST PT 1/>: CPT | Mod: HCNC,S$GLB,, | Performed by: OPTOMETRIST

## 2020-01-13 PROCEDURE — 92015 PR REFRACTION: ICD-10-PCS | Mod: HCNC,S$GLB,, | Performed by: OPTOMETRIST

## 2020-01-13 PROCEDURE — 92014 PR EYE EXAM, EST PATIENT,COMPREHESV: ICD-10-PCS | Mod: HCNC,S$GLB,, | Performed by: OPTOMETRIST

## 2020-01-13 NOTE — PROGRESS NOTES
HPI     Decreased near visual acuity with glasses.  Last eye exam 10/04/2018 TRF.  Update glasses RX.      Last edited by Guy Mireles, OD on 1/13/2020  9:45 AM. (History)            Assessment /Plan     For exam results, see Encounter Report.    Esotropia    Cataract, nuclear sclerotic senile, bilateral    Dry eyes, bilateral    Bilateral presbyopia      Stable esotropia, will continue with prism.    Mild cataracts OU, not surgical.    Dispense Final Rx for glasses.  RTC 1 year  Discussed above and answered questions.

## 2020-01-21 ENCOUNTER — OFFICE VISIT (OUTPATIENT)
Dept: ORTHOPEDICS | Facility: CLINIC | Age: 80
End: 2020-01-21
Payer: MEDICARE

## 2020-01-21 VITALS
SYSTOLIC BLOOD PRESSURE: 134 MMHG | HEART RATE: 71 BPM | BODY MASS INDEX: 24.62 KG/M2 | HEIGHT: 64 IN | DIASTOLIC BLOOD PRESSURE: 85 MMHG | WEIGHT: 144.19 LBS
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
## 2020-01-21 DIAGNOSIS — M25.512 ACUTE PAIN OF LEFT SHOULDER: ICD-10-CM

## 2020-01-21 DIAGNOSIS — M19.011 DJD OF RIGHT AC (ACROMIOCLAVICULAR) JOINT: ICD-10-CM

## 2020-01-21 DIAGNOSIS — M75.21 BICEPS TENDINITIS OF RIGHT SHOULDER: ICD-10-CM

## 2020-01-21 DIAGNOSIS — M75.81 RIGHT ROTATOR CUFF TENDINITIS: Primary | ICD-10-CM

## 2020-01-21 PROCEDURE — 3075F PR MOST RECENT SYSTOLIC BLOOD PRESS GE 130-139MM HG: ICD-10-PCS | Mod: HCNC,CPTII,S$GLB, | Performed by: PHYSICIAN ASSISTANT

## 2020-01-21 PROCEDURE — 1125F PR PAIN SEVERITY QUANTIFIED, PAIN PRESENT: ICD-10-PCS | Mod: HCNC,S$GLB,, | Performed by: PHYSICIAN ASSISTANT

## 2020-01-21 PROCEDURE — 3079F DIAST BP 80-89 MM HG: CPT | Mod: HCNC,CPTII,S$GLB, | Performed by: PHYSICIAN ASSISTANT

## 2020-01-21 PROCEDURE — 1100F PR PT FALLS ASSESS DOC 2+ FALLS/FALL W/INJURY/YR: ICD-10-PCS | Mod: HCNC,CPTII,S$GLB, | Performed by: PHYSICIAN ASSISTANT

## 2020-01-21 PROCEDURE — 1159F PR MEDICATION LIST DOCUMENTED IN MEDICAL RECORD: ICD-10-PCS | Mod: HCNC,S$GLB,, | Performed by: PHYSICIAN ASSISTANT

## 2020-01-21 PROCEDURE — 1125F AMNT PAIN NOTED PAIN PRSNT: CPT | Mod: HCNC,S$GLB,, | Performed by: PHYSICIAN ASSISTANT

## 2020-01-21 PROCEDURE — 99999 PR PBB SHADOW E&M-EST. PATIENT-LVL V: ICD-10-PCS | Mod: PBBFAC,HCNC,, | Performed by: PHYSICIAN ASSISTANT

## 2020-01-21 PROCEDURE — 3075F SYST BP GE 130 - 139MM HG: CPT | Mod: HCNC,CPTII,S$GLB, | Performed by: PHYSICIAN ASSISTANT

## 2020-01-21 PROCEDURE — 1100F PTFALLS ASSESS-DOCD GE2>/YR: CPT | Mod: HCNC,CPTII,S$GLB, | Performed by: PHYSICIAN ASSISTANT

## 2020-01-21 PROCEDURE — 3079F PR MOST RECENT DIASTOLIC BLOOD PRESSURE 80-89 MM HG: ICD-10-PCS | Mod: HCNC,CPTII,S$GLB, | Performed by: PHYSICIAN ASSISTANT

## 2020-01-21 PROCEDURE — 20610 PR DRAIN/INJECT LARGE JOINT/BURSA: ICD-10-PCS | Mod: HCNC,RT,S$GLB, | Performed by: PHYSICIAN ASSISTANT

## 2020-01-21 PROCEDURE — 1159F MED LIST DOCD IN RCRD: CPT | Mod: HCNC,S$GLB,, | Performed by: PHYSICIAN ASSISTANT

## 2020-01-21 PROCEDURE — 3288F PR FALLS RISK ASSESSMENT DOCUMENTED: ICD-10-PCS | Mod: HCNC,CPTII,S$GLB, | Performed by: PHYSICIAN ASSISTANT

## 2020-01-21 PROCEDURE — 99203 PR OFFICE/OUTPT VISIT, NEW, LEVL III, 30-44 MIN: ICD-10-PCS | Mod: 25,HCNC,S$GLB, | Performed by: PHYSICIAN ASSISTANT

## 2020-01-21 PROCEDURE — 3288F FALL RISK ASSESSMENT DOCD: CPT | Mod: HCNC,CPTII,S$GLB, | Performed by: PHYSICIAN ASSISTANT

## 2020-01-21 PROCEDURE — 20610 DRAIN/INJ JOINT/BURSA W/O US: CPT | Mod: HCNC,RT,S$GLB, | Performed by: PHYSICIAN ASSISTANT

## 2020-01-21 PROCEDURE — 99999 PR PBB SHADOW E&M-EST. PATIENT-LVL V: CPT | Mod: PBBFAC,HCNC,, | Performed by: PHYSICIAN ASSISTANT

## 2020-01-21 PROCEDURE — 99203 OFFICE O/P NEW LOW 30 MIN: CPT | Mod: 25,HCNC,S$GLB, | Performed by: PHYSICIAN ASSISTANT

## 2020-01-21 RX ORDER — METHYLPREDNISOLONE ACETATE 80 MG/ML
80 INJECTION, SUSPENSION INTRA-ARTICULAR; INTRALESIONAL; INTRAMUSCULAR; SOFT TISSUE ONCE
Status: COMPLETED | OUTPATIENT
Start: 2020-01-21 | End: 2020-01-21

## 2020-01-21 RX ADMIN — METHYLPREDNISOLONE ACETATE 80 MG: 80 INJECTION, SUSPENSION INTRA-ARTICULAR; INTRALESIONAL; INTRAMUSCULAR; SOFT TISSUE at 08:01

## 2020-01-21 NOTE — PROGRESS NOTES
Subjective:      Patient ID: Violte Swenson is a 79 y.o. female.    Chief Complaint: Pain of the Right Shoulder      HPI: Violet Swenson  is a 79 y.o. female who c/o Pain of the Right Shoulder   for duration of about 2 weeks.  Her  lost his balance and fell into her knocking her in to the wall of their garage.  This happened on 01/03/2020.  Her pain level presently is 8/10.  It is worsened with overhead use especially getting things out of the cabinet.  Quality is aching, burning, pulling.  Alleviating factors include resting it.    Past Medical History:   Diagnosis Date    Age-related osteoporosis without current pathological fracture 8/20/2018    Anemia     Anxiety     Arthritis     Atrial flutter     Cancer     lymphoma Large cell B    CHF (congestive heart failure)     Chronic anemia 4/26/2017    Chronic midline low back pain with right-sided sciatica 8/20/2018    Coronary artery disease     01/2015 LHC patent LCX. 50% stenosis in LAD and RCA.      Depression     Disorder of kidney and ureter     Encounter for blood transfusion     GERD (gastroesophageal reflux disease)     Gout, arthritis     Heart failure     Hx of psychiatric care     Hyperlipidemia     Hypertension     Hypothyroidism     Immune deficiency disorder     Kidney disease     Lung nodule 2014    RML--stable    Obesity     Pacemaker     Metronic    Paroxysmal atrial fibrillation 3/15/2018    Pneumonia     Polyneuropathy     chemo induced    Psychiatric problem     Tobacco dependence     quit 1976    Trouble in sleeping      Past Surgical History:   Procedure Laterality Date    APPENDECTOMY  1966 approx    CARDIAC PACEMAKER PLACEMENT  01/22/2015    CHOLECYSTECTOMY  1993    incidental at time of gastric bypass    COLON SURGERY Right 2017    hemicolectomy    COLONOSCOPY N/A 4/6/2017    Procedure: COLONOSCOPY;  Surgeon: Tye Enamorado MD;  Location: [facility];  Service: Endoscopy;   "Laterality: N/A;    COLONOSCOPY N/A 11/28/2018    Procedure: COLONOSCOPY;  Surgeon: Saúl Arthur III, MD;  Location: [facility];  Service: Endoscopy;  Laterality: N/A;    CORONARY ANGIOPLASTY  02/2014    CORONARY STENT PLACEMENT  02/05/2014    ESOPHAGOGASTRODUODENOSCOPY N/A 11/28/2018    Procedure: EGD (ESOPHAGOGASTRODUODENOSCOPY);  Surgeon: Saúl Arthur III, MD;  Location: [facility];  Service: Endoscopy;  Laterality: N/A;    GASTRIC BYPASS  1993    with incidental choly    HERNIA REPAIR      JOINT REPLACEMENT Bilateral 2009    3 months apart    TONSILLECTOMY  1959     Family History   Problem Relation Age of Onset    Heart disease Mother     Hypertension Mother     Cataracts Mother     Stomach cancer Father         "ulcers that turned to cancer"    Cancer Father         stomach    Pancreatic cancer Sister     Cancer Sister         pancreatic    Leukemia Brother         "leukemia which led to intestinal cancer"    Cataracts Brother     Cancer Brother         leukemia then later stomach cancer    Drug abuse Son     Cancer Son         prostate cancer    Prostate cancer Son     COPD Daughter     Asthma Daughter     Hypertension Maternal Grandfather     Stroke Maternal Grandfather     Alcohol abuse Neg Hx     Diabetes Neg Hx     Mental retardation Neg Hx     Mental illness Neg Hx      Social History     Socioeconomic History    Marital status:      Spouse name: Not on file    Number of children: 4    Years of education: Not on file    Highest education level: Not on file   Occupational History    Not on file   Social Needs    Financial resource strain: Not on file    Food insecurity:     Worry: Not on file     Inability: Not on file    Transportation needs:     Medical: Not on file     Non-medical: Not on file   Tobacco Use    Smoking status: Former Smoker     Packs/day: 2.00     Years: 6.00     Pack years: 12.00     Last attempt to quit: 3/15/1976     Years since " quittin.8    Smokeless tobacco: Never Used   Substance and Sexual Activity    Alcohol use: No     Alcohol/week: 0.0 standard drinks    Drug use: No    Sexual activity: Never   Lifestyle    Physical activity:     Days per week: Not on file     Minutes per session: Not on file    Stress: Not on file   Relationships    Social connections:     Talks on phone: Not on file     Gets together: Not on file     Attends [employer] service: Not on file     Active member of club or organization: Not on file     Attends meetings of clubs or organizations: Not on file     Relationship status: Not on file   Other Topics Concern    Patient feels they ought to cut down on drinking/drug use Not Asked    Patient annoyed by others criticizing their drinking/drug use Not Asked    Patient has felt bad or guilty about drinking/drug use Not Asked    Patient has had a drink/used drugs as an eye opener in the AM Not Asked   Social History Narrative    , lives with . She is a retired . 4 children (3 natural born, 1 adopted)- 2 ; 2x  (currently 17 years); son has prostate cancer, daughter COPD,  cardiac difficulty. She still drives. Does not have a Living will or Advanced Directive.     Medication List with Changes/Refills   Current Medications    ALLOPURINOL (ZYLOPRIM) 300 MG TABLET    TAKE ONE TABLET BY MOUTH EVERY DAY    ASCORBIC ACID, VITAMIN C, (VITAMIN C) 100 MG TABLET    Take by mouth once daily.    ASPIRIN (ECOTRIN) 81 MG EC TABLET    Take 81 mg by mouth nightly.     BUSPIRONE (BUSPAR) 5 MG TAB    Take 1 tablet (5 mg total) by mouth 2 (two) times daily as needed.    CALCITRIOL (ROCALTROL) 0.25 MCG CAP    TAKE ONE CAPSULE BY MOUTH EVERY MONDAY, WEDNESDAY AND FRIDAY    CLOPIDOGREL (PLAVIX) 75 MG TABLET    TAKE ONE TABLET BY MOUTH EVERY DAY    COLCRYS 0.6 MG TABLET    TAKE ONE TABLET BY MOUTH EVERY DAY    DICLOFENAC SODIUM 1 % GEL    Apply 2 g topically once daily. Apply 2 g over  painful joints once or twice a day.    ERGOCALCIFEROL (ERGOCALCIFEROL) 50,000 UNIT CAP    Take 1 capsule (50,000 Units total) by mouth every 7 days.    ERGOCALCIFEROL, VITAMIN D2, 400 UNIT TAB    Take by mouth.    FLUOCINOLONE (SYNALAR) 0.01 % EXTERNAL SOLUTION    AAA of scalp twice a day prn itching.    FLUTICASONE PROPIONATE (FLONASE) 50 MCG/ACTUATION NASAL SPRAY    2 sprays (100 mcg total) by Each Nare route once daily.    FUROSEMIDE (LASIX) 40 MG TABLET    Take 1 tab daily as needed for swelling, weight gain or shortness of breath    GABAPENTIN (NEURONTIN) 100 MG CAPSULE    Take 2 capsules (200 mg total) by mouth 3 (three) times daily.    IRON-VITAMIN C 100-250 MG, ICAR-C, 100-250 MG TAB    TAKE ONE TABLET BY MOUTH EVERY DAY    LEVOTHYROXINE (SYNTHROID) 75 MCG TABLET    TAKE ONE TABLET BY MOUTH EVERY DAY BEFORE BREAKFAST    MELOXICAM (MOBIC) 7.5 MG TABLET    TAKE ONE TABLET BY MOUTH EVERY DAY AS NEEDED FOR PAIN    METOPROLOL TARTRATE (LOPRESSOR) 25 MG TABLET    TAKE ONE TABLET BY MOUTH TWICE DAILY    MIRTAZAPINE (REMERON SOL-TAB) 15 MG DISINTEGRATING TABLET    DISSOLVE ONE TABLET BY MOUTH NIGHTLY    MULTIVITAMIN (ONE DAILY MULTIVITAMIN) PER TABLET    Take 1 tablet by mouth once daily.    PRAVASTATIN (PRAVACHOL) 40 MG TABLET    TAKE ONE TABLET BY MOUTH ONCE DAILY    RANITIDINE (ZANTAC) 150 MG CAPSULE    Take 150 mg by mouth nightly.     SERTRALINE (ZOLOFT) 100 MG TABLET    TAKE 1.5 TABLETS BY MOUTH EVERY DAY    SODIUM BICARBONATE 650 MG TABLET    TAKE ONE TABLET BY MOUTH TWICE DAILY    TRAMADOL (ULTRAM) 50 MG TABLET    Take 1 tablet (50 mg total) by mouth every 12 (twelve) hours as needed for Pain.    TRETINOIN (RETIN-A) 0.025 % CREAM    Apply a pea-sized amount to the entire face at bedtime.  Use every third night and increase as tolerated to nightly.    TRIAMCINOLONE ACETONIDE 0.025% (KENALOG) 0.025 % CREAM    Apply topically 2 (two) times daily. PRN itching.    ZINC ACETATE ORAL    Take 250 mg by mouth once  daily.      Review of patient's allergies indicates:   Allergen Reactions    Corticosteroids (glucocorticoids) Nausea Only and Other (See Comments)     Stomach pain, dizziness, headache    Oxycodone Other (See Comments)     Blood pressure dropped       Review of Systems   Constitution: Negative for fever.   Cardiovascular: Negative for chest pain.   Respiratory: Negative for cough and shortness of breath.    Skin: Negative for rash.   Musculoskeletal: Positive for joint pain and stiffness. Negative for joint swelling.   Gastrointestinal: Negative for heartburn.   Neurological: Negative for headaches and numbness.         Objective:        General    Nursing note and vitals reviewed.  Constitutional: She is oriented to person, place, and time. She appears well-developed and well-nourished.   HENT:   Head: Normocephalic and atraumatic.   Eyes: EOM are normal.   Cardiovascular: Normal rate and regular rhythm.    Pulmonary/Chest: Effort normal.   Abdominal: Soft.   Neurological: She is alert and oriented to person, place, and time.   Psychiatric: She has a normal mood and affect. Her behavior is normal.         Right Shoulder Exam     Inspection/Observation   Swelling: absent  Bruising: absent  Scars: absent  Deformity: absent  Scapular Dyskinesia: negative    Tenderness   The patient is tender to palpation of the greater tuberosity and biceps tendon.    Range of Motion   Active abduction:  140 abnormal   Passive abduction: normal   Extension: abnormal   Forward Flexion:  120 abnormal   Forward Elevation: abnormal  Adduction: normal  External Rotation 0 degrees: normal   Internal rotation 0 degrees: normal     Tests & Signs   Cross arm: negative  Drop arm: negative  Miller test: positive  Impingement: positive  Rotator Cuff Painful Arc/Range: moderate  Active Compression Test (Bristol's Sign): negative  Speed's Test: negative    Other   Sensation: normal    Left Shoulder Exam     Inspection/Observation   Swelling:  absent  Bruising: absent  Scars: absent  Deformity: absent  Scapular Dyskinesia: negative    Range of Motion   Active abduction: normal   Passive abduction: normal   Extension: normal   Forward Flexion: normal   Forward Elevation: normal  Adduction: normal  External Rotation 0 degrees: normal   Internal rotation 0 degrees: normal     Other   Sensation: normal       Muscle Strength   Right Upper Extremity   Shoulder Abduction: 5/5   Shoulder Internal Rotation: 5/5   Shoulder External Rotation: 4/5   Left Upper Extremity  Shoulder Abduction: 5/5   Shoulder Internal Rotation: 5/5   Shoulder External Rotation: 5/5     Vascular Exam     Right Pulses      Radial:                    2+      Left Pulses      Radial:                    2+      Capillary Refill  Right Hand: normal capillary refill  Left Hand: normal capillary refill              Xray images and report were reviewed today.  I agree with the radiologist's interpretation.    X-ray Shoulder 2 or More Views Right  Narrative: EXAMINATION:  XR SHOULDER COMPLETE 2 OR MORE VIEWS RIGHT    CLINICAL HISTORY:  Pain in shoulder    TECHNIQUE:  Two or three views of the right shoulder were performed.    COMPARISON:  None    FINDINGS:  Lung parenchyma clear.  Mild AC joint degenerative changes.  Degenerative findings of the superolateral humeral head noted.  Glenohumeral joint space maintained with anterior glenoid osteophyte spurring..  Impression: As above    Electronically signed by: Luís Boudreaux MD  Date:    01/08/2020  Time:    10:07        Assessment:       Encounter Diagnoses   Name Primary?    Acute pain of left shoulder     Right rotator cuff tendinitis Yes    Biceps tendinitis of right shoulder     DJD of right AC (acromioclavicular) joint     Fall, initial encounter           Plan:       Violet was seen today for pain.    Diagnoses and all orders for this visit:    Right rotator cuff tendinitis  -     methylPREDNISolone acetate injection 80 mg  -      Ambulatory Referral to Physical/Occupational Therapy    Acute pain of left shoulder  -     Ambulatory referral/consult to Orthopedics  -     methylPREDNISolone acetate injection 80 mg  -     Ambulatory Referral to Physical/Occupational Therapy    Biceps tendinitis of right shoulder  -     methylPREDNISolone acetate injection 80 mg  -     Ambulatory Referral to Physical/Occupational Therapy    DJD of right AC (acromioclavicular) joint  -     methylPREDNISolone acetate injection 80 mg  -     Ambulatory Referral to Physical/Occupational Therapy    Fall, initial encounter        Violet Swenson is a new pt who comes in today for the above problems.  His rotator cuff tendinitis along with irritation along her biceps tendon.  The majority of her pain seems to be stemming from her rotator cuff.  We have discussed risks and benefits of a corticosteroid injection in the right shoulder.  She wishes to proceed.  She in no way wants to consider surgery at this time so we will optimize conservative management rather than getting further diagnostic workup.  She will go to physical therapy 2 times a week over the next 4-6 weeks.  I will see her back in the office in 6 weeks to re-evaluate her progress.  If she has problems before then, she will notify the office.  She is already on an oral anti-inflammatory.  She verbalizes understanding and agrees.    She has glucocorticoid listed as an allergy in her chart causing dizziness and stomach pain.  She states this happened when she took an oral prednisone tablet.  She has had steroid knee injections in the past without any significant side effects.    Follow up in about 6 weeks (around 3/3/2020) for no xray.    Right Shoulder Injection Report:  After verbal consent was obtained for right shoulder injection, patient ID, site, and side were verified.  The  right  Shoulder was sterilly prepped in the standard fashion.  A 22-gauge needle was introduced into right subacromial  space from the posterior portal approach without complication. The right shoulder was then injected with 20 mg lidocaine plain and 80 mg depomedrol.  A sterile bandaid was applied.  The patient was informed to apply an ice pack approximately 10min once arriving home and not to do anything strenuous for 24hours. She was instructed to call if there were any problems. The patient was discharged in stable condition.    The patient understands, chooses and consents to this plan and accepts all   the risks which include but are not limited to the risks mentioned above.     Disclaimer: This note was prepared using a voice recognition system and is likely to have sound alike errors within the text.

## 2020-01-21 NOTE — LETTER
January 21, 2020      Naty Dsouza NP  [address] [place] [address] [address] [id number]           [facility]  [address] [place] [address] [address] [id number]  Phone: [phone]  Fax: [phone]          Patient: Violet Swenson   MR Number: [id number]   YOB: 1940   Date of Visit: 1/21/2020       Dear Naty Dsouza:    Thank you for referring Violet Swenson to me for evaluation. Attached you will find relevant portions of my assessment and plan of care.    If you have questions, please do not hesitate to call me. I look forward to following Violet Swenson along with you.    Sincerely,    Pamela Farr PA-C    Enclosure  CC:  No Recipients    If you would like to receive this communication electronically, please contact [email or web address] or [phone] to request more information on [employer] access.    For providers and/or their staff who would like to refer a patient to [facility], please contact us through our one-stop-shop provider referral line, [facility], at [phone].    If you feel you have received this communication in error or would no longer like to receive these types of communications, please e-mail [email or web address]

## 2020-01-28 DIAGNOSIS — F41.9 ANXIETY: ICD-10-CM

## 2020-01-28 RX ORDER — BUSPIRONE HYDROCHLORIDE 5 MG/1
TABLET ORAL
Qty: 60 TABLET | Refills: 0 | Status: SHIPPED | OUTPATIENT
Start: 2020-01-28 | End: 2020-02-03

## 2020-01-31 ENCOUNTER — CLINICAL SUPPORT (OUTPATIENT)
Dept: REHABILITATION | Facility: HOSPITAL | Age: 80
End: 2020-01-31
Payer: MEDICARE

## 2020-01-31 DIAGNOSIS — G62.9 NEUROPATHY: ICD-10-CM

## 2020-01-31 DIAGNOSIS — M25.511 CHRONIC RIGHT SHOULDER PAIN: Primary | ICD-10-CM

## 2020-01-31 DIAGNOSIS — G89.29 CHRONIC RIGHT SHOULDER PAIN: Primary | ICD-10-CM

## 2020-01-31 DIAGNOSIS — M1A.09X0 IDIOPATHIC CHRONIC GOUT OF MULTIPLE SITES WITHOUT TOPHUS: ICD-10-CM

## 2020-01-31 DIAGNOSIS — C85.83 LARGE CELL LYMPHOMA OF INTRA-ABDOMINAL LYMPH NODES: ICD-10-CM

## 2020-01-31 PROBLEM — M25.512 ACUTE PAIN OF LEFT SHOULDER: Status: ACTIVE | Noted: 2020-01-31

## 2020-01-31 PROCEDURE — 97161 PT EVAL LOW COMPLEX 20 MIN: CPT | Mod: HCNC

## 2020-01-31 PROCEDURE — 97110 THERAPEUTIC EXERCISES: CPT | Mod: HCNC

## 2020-01-31 RX ORDER — ALLOPURINOL 300 MG/1
TABLET ORAL
Qty: 90 TABLET | Refills: 3 | Status: SHIPPED | OUTPATIENT
Start: 2020-01-31 | End: 2020-08-10

## 2020-01-31 RX ORDER — CALCITRIOL 0.25 UG/1
CAPSULE ORAL
Qty: 12 CAPSULE | Refills: 5 | Status: SHIPPED | OUTPATIENT
Start: 2020-01-31 | End: 2020-07-29

## 2020-01-31 RX ORDER — COLCHICINE 0.6 MG/1
TABLET, FILM COATED ORAL
Qty: 30 TABLET | Refills: 11 | Status: SHIPPED | OUTPATIENT
Start: 2020-01-31 | End: 2020-08-10
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
## 2020-02-03 DIAGNOSIS — F41.9 ANXIETY: ICD-10-CM

## 2020-02-03 RX ORDER — BUSPIRONE HYDROCHLORIDE 5 MG/1
TABLET ORAL
Qty: 60 TABLET | Refills: 0 | Status: SHIPPED | OUTPATIENT
Start: 2020-02-03 | End: 2020-02-12

## 2020-02-03 RX ORDER — GABAPENTIN 100 MG/1
200 CAPSULE ORAL 3 TIMES DAILY
Qty: 180 CAPSULE | Refills: 2 | Status: SHIPPED | OUTPATIENT
Start: 2020-02-03 | End: 2020-03-09

## 2020-02-06 ENCOUNTER — CLINICAL SUPPORT (OUTPATIENT)
Dept: REHABILITATION | Facility: HOSPITAL | Age: 80
End: 2020-02-06
Payer: MEDICARE

## 2020-02-06 DIAGNOSIS — M25.511 CHRONIC RIGHT SHOULDER PAIN: ICD-10-CM

## 2020-02-06 DIAGNOSIS — G89.29 CHRONIC RIGHT SHOULDER PAIN: ICD-10-CM

## 2020-02-06 PROCEDURE — 97110 THERAPEUTIC EXERCISES: CPT | Mod: HCNC

## 2020-02-09 NOTE — PROGRESS NOTES
Physical Therapy Daily Treatment Note     Name: Violet Mcnally [facility]  Clinic Number: [id number]    Therapy Diagnosis:   Encounter Diagnosis   Name Primary?    Chronic right shoulder pain      Physician: Pamela Farr,*    Visit Date: 2/6/2020    Physician Orders: PT Eval and Treat   Medical Diagnosis from Referral:  Evaluation Date: 1/31/2020  Authorization Period Expiration: Right rotator cuff tendinitis  Plan of Care Expiration:3/1/2020  Visit # / Visits authorized: 2/20    Time In: 1055  Time Out: 1140  Total Billable Time: 45 minutes    Precautions: pacemaker    Subjective     Pt reports: Pain right shoulder.  She was compliant with home exercise program.  Response to previous treatment: N/A  Functional change: N/A    Pain: 1/10  Location: right shoulder      Objective     Violet received therapeutic exercises to develop strength, ROM and posture for 45 minutes including:  Matrix rows 2x15 10#  Matrix presses 2x15 10#  Shoulder flexion with lift off  Shoulder (B) ER with red band  Shoulder IR cable 10#  Supine cane flexion  Sidelying ER 1#    Home Exercises Provided and Patient Education Provided     Education provided:   - Reviewed home program    Written Home Exercises Provided: Patient instructed to cont prior HEP.  Exercises were reviewed and Violet was able to demonstrate them prior to the end of the session.  Violet demonstrated good  understanding of the education provided.     See EMR under Media for exercises provided prior visit.    Assessment     Violet is a 79 y.o. female referred to outpatient Physical Therapy with a medical diagnosis of Right rotator cuff tendinitis. The patient presents with impairments which include decreased ROM, decreased strength, decreased joint mobility and decreased overall function.      Violet is progressing well towards her goals.   Pt prognosis is Good.     Pt will continue to benefit from skilled outpatient physical therapy to address the deficits  listed in the problem list box on initial evaluation, provide pt/family education and to maximize pt's level of independence in the home and community environment.     Pt's spiritual, cultural and educational needs considered and pt agreeable to plan of care and goals.     Anticipated barriers to physical therapy: none    Goals: Short Term Goals: In 3 weeks   1.I with HEP  2.Patient to increase GH ROM from 90 to 100 degrees flexion    3.Patient to increase MMT strength from 3-/5 to 3/5    4.Patient to have pain less than 4/10 at all times.     Long Term Goals: In 6 weeks  1. Patient to demo increase in UE strength to 3+/5  2. Patient to have decreased pain to 2/10 at all times.  3. Patient to demo increase GH ROM to 120  4. Patient to perform daily activities including raising arm and lifting without limitation.    Plan     Plan of care Certification: 1/31/2020 to 3/1/2020.     Outpatient Physical Therapy 2 times weekly for 6 weeks to include the following interventions: Manual Therapy, Moist Heat/ Ice, Patient Education, Therapeutic Activites and Therapeutic Exercise.        Ti Chance, PT

## 2020-02-10 ENCOUNTER — CLINICAL SUPPORT (OUTPATIENT)
Dept: REHABILITATION | Facility: HOSPITAL | Age: 80
End: 2020-02-10
Payer: MEDICARE

## 2020-02-10 DIAGNOSIS — M25.511 CHRONIC RIGHT SHOULDER PAIN: ICD-10-CM

## 2020-02-10 DIAGNOSIS — G89.29 CHRONIC RIGHT SHOULDER PAIN: ICD-10-CM

## 2020-02-10 PROCEDURE — 97110 THERAPEUTIC EXERCISES: CPT | Mod: HCNC

## 2020-02-11 NOTE — PROGRESS NOTES
Physical Therapy Daily Treatment Note     Name: Violet Mcnally [facility]  Clinic Number: [id number]    Therapy Diagnosis:   Encounter Diagnosis   Name Primary?    Chronic right shoulder pain      Physician: Pamela Farr,*    Visit Date: 2/10/2020    Physician Orders: PT Eval and Treat   Medical Diagnosis from Referral:  Evaluation Date: 1/31/2020  Authorization Period Expiration: Right rotator cuff tendinitis  Plan of Care Expiration:3/1/2020  Visit # / Visits authorized: 2/20    Time In: 1100  Time Out: 1145  Total Billable Time: 45 minutes    Precautions: pacemaker    Subjective     Pt reports: decreased pain which increases with performance of exs and stretch  She was compliant with home exercise program.  Response to previous treatment: N/A  Functional change: N/A    Pain: 1/10  Location: right shoulder      Objective     Violet received therapeutic exercises to develop strength, ROM and posture for 45 minutes including:  Matrix rows 2x15 10#  Matrix presses 2x15 10#  Shoulder flexion with lift off  Shoulder (B) ER with red band  Shoulder IR cable 10# Painful  Supine cane flexion with cues to use thumbs up  Sidelying ER 1#  Bent over shoulder flexion    Home Exercises Provided and Patient Education Provided     Education provided:   - Reviewed home program    Written Home Exercises Provided: Patient instructed to cont prior HEP.  Exercises were reviewed and Violet was able to demonstrate them prior to the end of the session.  Violet demonstrated good  understanding of the education provided.     See EMR under Media for exercises provided prior visit.    Assessment     Violet is a 79 y.o. female referred to outpatient Physical Therapy with a medical diagnosis of Right rotator cuff tendinitis. The patient presents with impairments which include decreased ROM, decreased strength, decreased joint mobility and decreased overall function.      Violet is progressing well towards her goals.   Pt  prognosis is Good.     Pt will continue to benefit from skilled outpatient physical therapy to address the deficits listed in the problem list box on initial evaluation, provide pt/family education and to maximize pt's level of independence in the home and community environment.     Pt's spiritual, cultural and educational needs considered and pt agreeable to plan of care and goals.     Anticipated barriers to physical therapy: none    Goals: Short Term Goals: In 3 weeks   1.I with HEP  2.Patient to increase GH ROM from 90 to 100 degrees flexion    3.Patient to increase MMT strength from 3-/5 to 3/5    4.Patient to have pain less than 4/10 at all times.     Long Term Goals: In 6 weeks  1. Patient to demo increase in UE strength to 3+/5  2. Patient to have decreased pain to 2/10 at all times.  3. Patient to demo increase GH ROM to 120  4. Patient to perform daily activities including raising arm and lifting without limitation.    Plan     Plan of care Certification: 1/31/2020 to 3/1/2020.     Outpatient Physical Therapy 2 times weekly for 6 weeks to include the following interventions: Manual Therapy, Moist Heat/ Ice, Patient Education, Therapeutic Activites and Therapeutic Exercise.        Ti Chance, PT

## 2020-02-12 ENCOUNTER — OFFICE VISIT (OUTPATIENT)
Dept: INTERNAL MEDICINE | Facility: CLINIC | Age: 80
End: 2020-02-12
Payer: MEDICARE

## 2020-02-12 VITALS
TEMPERATURE: 98 F | HEART RATE: 72 BPM | WEIGHT: 142.44 LBS | RESPIRATION RATE: 16 BRPM | HEIGHT: 64 IN | OXYGEN SATURATION: 95 % | SYSTOLIC BLOOD PRESSURE: 108 MMHG | BODY MASS INDEX: 24.32 KG/M2 | DIASTOLIC BLOOD PRESSURE: 70 MMHG

## 2020-02-12 DIAGNOSIS — I10 ESSENTIAL HYPERTENSION: Chronic | ICD-10-CM

## 2020-02-12 DIAGNOSIS — J30.9 ALLERGIC RHINITIS, UNSPECIFIED SEASONALITY, UNSPECIFIED TRIGGER: ICD-10-CM

## 2020-02-12 DIAGNOSIS — F41.9 ANXIETY: Primary | ICD-10-CM

## 2020-02-12 PROCEDURE — 1125F PR PAIN SEVERITY QUANTIFIED, PAIN PRESENT: ICD-10-PCS | Mod: HCNC,S$GLB,, | Performed by: NURSE PRACTITIONER

## 2020-02-12 PROCEDURE — 99999 PR PBB SHADOW E&M-EST. PATIENT-LVL V: ICD-10-PCS | Mod: PBBFAC,HCNC,, | Performed by: NURSE PRACTITIONER

## 2020-02-12 PROCEDURE — 3074F SYST BP LT 130 MM HG: CPT | Mod: HCNC,CPTII,S$GLB, | Performed by: NURSE PRACTITIONER

## 2020-02-12 PROCEDURE — 3288F FALL RISK ASSESSMENT DOCD: CPT | Mod: HCNC,CPTII,S$GLB, | Performed by: NURSE PRACTITIONER

## 2020-02-12 PROCEDURE — 3078F PR MOST RECENT DIASTOLIC BLOOD PRESSURE < 80 MM HG: ICD-10-PCS | Mod: HCNC,CPTII,S$GLB, | Performed by: NURSE PRACTITIONER

## 2020-02-12 PROCEDURE — 3288F PR FALLS RISK ASSESSMENT DOCUMENTED: ICD-10-PCS | Mod: HCNC,CPTII,S$GLB, | Performed by: NURSE PRACTITIONER

## 2020-02-12 PROCEDURE — 3074F PR MOST RECENT SYSTOLIC BLOOD PRESSURE < 130 MM HG: ICD-10-PCS | Mod: HCNC,CPTII,S$GLB, | Performed by: NURSE PRACTITIONER

## 2020-02-12 PROCEDURE — 1100F PR PT FALLS ASSESS DOC 2+ FALLS/FALL W/INJURY/YR: ICD-10-PCS | Mod: HCNC,CPTII,S$GLB, | Performed by: NURSE PRACTITIONER

## 2020-02-12 PROCEDURE — 99214 OFFICE O/P EST MOD 30 MIN: CPT | Mod: HCNC,S$GLB,, | Performed by: NURSE PRACTITIONER

## 2020-02-12 PROCEDURE — 1159F MED LIST DOCD IN RCRD: CPT | Mod: HCNC,S$GLB,, | Performed by: NURSE PRACTITIONER

## 2020-02-12 PROCEDURE — 1159F PR MEDICATION LIST DOCUMENTED IN MEDICAL RECORD: ICD-10-PCS | Mod: HCNC,S$GLB,, | Performed by: NURSE PRACTITIONER

## 2020-02-12 PROCEDURE — 99214 PR OFFICE/OUTPT VISIT, EST, LEVL IV, 30-39 MIN: ICD-10-PCS | Mod: HCNC,S$GLB,, | Performed by: NURSE PRACTITIONER

## 2020-02-12 PROCEDURE — 3078F DIAST BP <80 MM HG: CPT | Mod: HCNC,CPTII,S$GLB, | Performed by: NURSE PRACTITIONER

## 2020-02-12 PROCEDURE — 1125F AMNT PAIN NOTED PAIN PRSNT: CPT | Mod: HCNC,S$GLB,, | Performed by: NURSE PRACTITIONER

## 2020-02-12 PROCEDURE — 1100F PTFALLS ASSESS-DOCD GE2>/YR: CPT | Mod: HCNC,CPTII,S$GLB, | Performed by: NURSE PRACTITIONER

## 2020-02-12 PROCEDURE — 99999 PR PBB SHADOW E&M-EST. PATIENT-LVL V: CPT | Mod: PBBFAC,HCNC,, | Performed by: NURSE PRACTITIONER

## 2020-02-12 RX ORDER — LEVOCETIRIZINE DIHYDROCHLORIDE 5 MG/1
5 TABLET, FILM COATED ORAL NIGHTLY
Qty: 30 TABLET | Refills: 11 | Status: SHIPPED | OUTPATIENT
Start: 2020-02-12 | End: 2020-12-24

## 2020-02-12 RX ORDER — BUSPIRONE HYDROCHLORIDE 7.5 MG/1
7.5 TABLET ORAL 3 TIMES DAILY
Qty: 90 TABLET | Refills: 0 | Status: SHIPPED | OUTPATIENT
Start: 2020-02-12 | End: 2020-03-02

## 2020-02-12 NOTE — PROGRESS NOTES
"Violet Swenson 79 y.o. female     Chief Complaint:  Chief Complaint   Patient presents with    Dizziness    Headache       History of Present Illness:  Pt present for 1 mo f/u on anxiety  Last office visit 01/06/2020  Started on buspar 5 mg b.i.d.  Reports improvement, but still present  " I can't make what is causing the stress go away "     No falls since last visit    Also complaining of runny nose, dizziness, and headache x3 days  Takes Flonase p.r.n.  Denies cough/chest pain/urinary issues    Exam:  Review of Systems   Constitutional: Negative for activity change, appetite change, chills, diaphoresis, fever and unexpected weight change.   HENT: Positive for congestion and rhinorrhea. Negative for trouble swallowing.    Eyes: Negative for discharge and visual disturbance.   Respiratory: Negative for shortness of breath.    Cardiovascular: Negative for chest pain and leg swelling.   Gastrointestinal: Negative for abdominal pain, diarrhea, nausea and vomiting.   Genitourinary: Negative for difficulty urinating and dysuria.   Musculoskeletal: Negative for gait problem.   Skin: Negative for wound.   Neurological: Positive for dizziness and headaches. Negative for speech difficulty.   Psychiatric/Behavioral: Negative for confusion.     Physical Exam   Constitutional: She is oriented to person, place, and time. She appears well-developed and well-nourished. She is cooperative.  Non-toxic appearance. She does not have a sickly appearance. She does not appear ill. No distress.   HENT:   Head: Normocephalic and atraumatic.   Right Ear: Hearing, tympanic membrane, external ear and ear canal normal.   Left Ear: Hearing, tympanic membrane, external ear and ear canal normal.   Nose: Mucosal edema and rhinorrhea present.   Mouth/Throat: Oropharynx is clear and moist and mucous membranes are normal.   Eyes: Pupils are equal, round, and reactive to light. Conjunctivae and EOM are normal. Right eye exhibits no " discharge. Left eye exhibits no discharge. No scleral icterus.   Neck: Normal range of motion. No tracheal deviation present.   Cardiovascular: Normal rate and regular rhythm.   Pulmonary/Chest: Effort normal and breath sounds normal. No stridor. No respiratory distress. She has no wheezes. She has no rales. She exhibits no tenderness.   Abdominal: Soft. Bowel sounds are normal. She exhibits no distension. There is no tenderness.   Musculoskeletal: Normal range of motion. She exhibits no edema.   Neurological: She is alert and oriented to person, place, and time.   Skin: Skin is warm, dry and intact. No rash noted. She is not diaphoretic. No erythema. No pallor.   Psychiatric: Her speech is normal and behavior is normal. Judgment and thought content normal. Cognition and memory are normal. She exhibits a depressed mood.   Nursing note and vitals reviewed.    Most Recent Laboratory Results Reviewed ({Yes)  Lab Results   Component Value Date    WBC 7.80 12/02/2019    HGB 10.3 (L) 12/02/2019    HCT 34.4 (L) 12/02/2019     12/02/2019    CHOL 92 (L) 07/19/2018    TRIG 53 07/19/2018    HDL 55 07/19/2018    ALT 33 11/07/2019    AST 43 (H) 11/07/2019     12/02/2019    K 4.2 12/02/2019     (H) 12/02/2019    CREATININE 1.5 (H) 12/02/2019    BUN 28 (H) 12/02/2019    CO2 23 12/02/2019    TSH 5.071 (H) 02/25/2019    INR 1.0 11/30/2018       Assessment     ICD-10-CM ICD-9-CM   1. Anxiety F41.9 300.00   2. Allergic rhinitis, unspecified seasonality, unspecified trigger J30.9 477.9   3. Essential hypertension I10 401.9        Plan   Anxiety  -     busPIRone (BUSPAR) 7.5 MG tablet; Take 1 tablet (7.5 mg total) by mouth 3 (three) times daily.  Dispense: 90 tablet; Refill: 0    Allergic rhinitis, unspecified seasonality, unspecified trigger  -     levocetirizine (XYZAL) 5 MG tablet; Take 1 tablet (5 mg total) by mouth every evening.  Dispense: 30 tablet; Refill: 11    Essential hypertension   controlled, continue  current meds     Follow up in about 1 month (around 3/12/2020), or if symptoms worsen or fail to improve, for PCP follow up.  Future Appointments     Date Provider Specialty Appt Notes    2/17/2020 Ti Chance, PT Outpatient Rehab est patient    2/19/2020 Ti Chance, PT Outpatient Rehab est patient    2/24/2020 Robert Gomez MD Nephrology EP/LABS/SF    2/24/2020 Ti Chance PT Outpatient Rehab est patient    2/26/2020  Lab     2/26/2020 Da Matson MD Hematology and Oncology 3 mo fu//tgv    2/26/2020 Ti Chance, PT Outpatient Rehab est patient    3/2/2020 Pamela Farr, PA-C Orthopedics FU Right Shoulder PT    3/9/2020 Dilshad Rogers MD Rheumatology Follow Up     3/12/2020 Nader Gil MD Cardiology 6 month follow-up

## 2020-02-17 ENCOUNTER — CLINICAL SUPPORT (OUTPATIENT)
Dept: REHABILITATION | Facility: HOSPITAL | Age: 80
End: 2020-02-17
Payer: MEDICARE

## 2020-02-17 DIAGNOSIS — M25.511 CHRONIC RIGHT SHOULDER PAIN: ICD-10-CM

## 2020-02-17 DIAGNOSIS — G89.29 CHRONIC RIGHT SHOULDER PAIN: ICD-10-CM

## 2020-02-17 PROCEDURE — 97110 THERAPEUTIC EXERCISES: CPT | Mod: HCNC

## 2020-02-18 NOTE — PROGRESS NOTES
Physical Therapy Daily Treatment Note     Name: Violet Mcnally [place]  Clinic Number: [id number]    Therapy Diagnosis:   Encounter Diagnosis   Name Primary?    Chronic right shoulder pain      Physician: Pamela Farr,*    Visit Date: 2/17/2020    Physician Orders: PT Eval and Treat   Medical Diagnosis from Referral:  Evaluation Date: 1/31/2020  Authorization Period Expiration: Right rotator cuff tendinitis  Plan of Care Expiration:3/1/2020  Visit # / Visits authorized: 3/20    Time In: 1105  Time Out: 1150  Total Billable Time: 30 minutes    Precautions: pacemaker    Subjective     Pt reports: swelling left shoulder with increase in pain.   States it started last week.  She was compliant with home exercise program.  Response to previous treatment: N/A  Functional change: N/A    Pain: 1/10  Location: right shoulder      Objective     Violet received therapeutic exercises to develop strength, ROM and posture for 30 minutes including:  Cable rows 10#  Wall glide shoulder flexion  Iso. Shoulder IR/ER  Over head pulleys  Supine shoulder flexion  Codmans    Home Exercises Provided and Patient Education Provided     Education provided:   - Reviewed home program    Written Home Exercises Provided: Patient instructed to cont prior HEP.  Exercises were reviewed and Violet was able to demonstrate them prior to the end of the session.  Violet demonstrated good  understanding of the education provided.     See EMR under Media for exercises provided prior visit.    Assessment     Violet has localized swelling right deltoid with pain.  Her program was reduced today with removal of resisted IR/ER of the shoulder.   The area was tender to touch and increased pain with IR/ER exs    Violet is progressing well towards her goals.   Pt prognosis is Good.     Pt will continue to benefit from skilled outpatient physical therapy to address the deficits listed in the problem list box on initial evaluation, provide  pt/family education and to maximize pt's level of independence in the home and community environment.     Pt's spiritual, cultural and educational needs considered and pt agreeable to plan of care and goals.     Anticipated barriers to physical therapy: none    Goals: Short Term Goals: In 3 weeks   1.I with HEP  2.Patient to increase GH ROM from 90 to 100 degrees flexion    3.Patient to increase MMT strength from 3-/5 to 3/5    4.Patient to have pain less than 4/10 at all times.     Long Term Goals: In 6 weeks  1. Patient to demo increase in UE strength to 3+/5  2. Patient to have decreased pain to 2/10 at all times.  3. Patient to demo increase GH ROM to 120  4. Patient to perform daily activities including raising arm and lifting without limitation.    Plan     Plan of care Certification: 1/31/2020 to 3/1/2020.     Outpatient Physical Therapy 2 times weekly for 6 weeks to include the following interventions: Manual Therapy, Moist Heat/ Ice, Patient Education, Therapeutic Activites and Therapeutic Exercise.        Ti Chance, PT

## 2020-02-19 ENCOUNTER — CLINICAL SUPPORT (OUTPATIENT)
Dept: REHABILITATION | Facility: HOSPITAL | Age: 80
End: 2020-02-19
Payer: MEDICARE

## 2020-02-19 DIAGNOSIS — G89.29 CHRONIC RIGHT SHOULDER PAIN: ICD-10-CM

## 2020-02-19 DIAGNOSIS — M25.511 CHRONIC RIGHT SHOULDER PAIN: ICD-10-CM

## 2020-02-19 PROCEDURE — 97110 THERAPEUTIC EXERCISES: CPT | Mod: HCNC

## 2020-02-21 DIAGNOSIS — N18.30 STAGE 3 CHRONIC KIDNEY DISEASE: Primary | Chronic | ICD-10-CM

## 2020-02-24 ENCOUNTER — CLINICAL SUPPORT (OUTPATIENT)
Dept: AUDIOLOGY | Facility: CLINIC | Age: 80
End: 2020-02-24
Payer: MEDICARE

## 2020-02-24 ENCOUNTER — OFFICE VISIT (OUTPATIENT)
Dept: NEPHROLOGY | Facility: CLINIC | Age: 80
End: 2020-02-24
Payer: MEDICARE

## 2020-02-24 VITALS
SYSTOLIC BLOOD PRESSURE: 100 MMHG | WEIGHT: 137.81 LBS | RESPIRATION RATE: 20 BRPM | DIASTOLIC BLOOD PRESSURE: 60 MMHG | BODY MASS INDEX: 23.53 KG/M2 | HEART RATE: 68 BPM | HEIGHT: 64 IN

## 2020-02-24 DIAGNOSIS — E87.20 ACIDOSIS: ICD-10-CM

## 2020-02-24 DIAGNOSIS — N18.31 CHRONIC KIDNEY DISEASE (CKD) STAGE G3A/A1, MODERATELY DECREASED GLOMERULAR FILTRATION RATE (GFR) BETWEEN 45-59 ML/MIN/1.73 SQUARE METER AND ALBUMINURIA CREATININE RATIO LESS THAN 30 MG/G: Primary | ICD-10-CM

## 2020-02-24 DIAGNOSIS — N27.0 SMALL KIDNEY, UNILATERAL: ICD-10-CM

## 2020-02-24 DIAGNOSIS — H81.13 BENIGN PAROXYSMAL VERTIGO, BILATERAL: ICD-10-CM

## 2020-02-24 DIAGNOSIS — E55.9 VITAMIN D DEFICIENCY: ICD-10-CM

## 2020-02-24 DIAGNOSIS — H81.12 BPPV (BENIGN PAROXYSMAL POSITIONAL VERTIGO), LEFT: Primary | ICD-10-CM

## 2020-02-24 DIAGNOSIS — E87.5 HYPERKALEMIA: ICD-10-CM

## 2020-02-24 DIAGNOSIS — N14.0 ANALGESIC NEPHROPATHY: ICD-10-CM

## 2020-02-24 DIAGNOSIS — H93.A1 PULSATILE TINNITUS, RIGHT EAR: ICD-10-CM

## 2020-02-24 DIAGNOSIS — H90.A31 MIXED CONDUCTIVE AND SENSORINEURAL HEARING LOSS OF RIGHT EAR WITH RESTRICTED HEARING OF LEFT EAR: ICD-10-CM

## 2020-02-24 DIAGNOSIS — E21.3 HPTH (HYPERPARATHYROIDISM): ICD-10-CM

## 2020-02-24 DIAGNOSIS — Q63.1 LOBULATED KIDNEY: ICD-10-CM

## 2020-02-24 DIAGNOSIS — R42 VERTIGO: ICD-10-CM

## 2020-02-24 PROCEDURE — 1159F MED LIST DOCD IN RCRD: CPT | Mod: HCNC,S$GLB,, | Performed by: INTERNAL MEDICINE

## 2020-02-24 PROCEDURE — 1126F AMNT PAIN NOTED NONE PRSNT: CPT | Mod: HCNC,S$GLB,, | Performed by: INTERNAL MEDICINE

## 2020-02-24 PROCEDURE — 3074F PR MOST RECENT SYSTOLIC BLOOD PRESSURE < 130 MM HG: ICD-10-PCS | Mod: HCNC,CPTII,S$GLB, | Performed by: INTERNAL MEDICINE

## 2020-02-24 PROCEDURE — 92567 TYMPANOMETRY: CPT | Mod: HCNC,S$GLB,, | Performed by: AUDIOLOGIST-HEARING AID FITTER

## 2020-02-24 PROCEDURE — 99499 UNLISTED E&M SERVICE: CPT | Mod: HCNC,S$GLB,, | Performed by: INTERNAL MEDICINE

## 2020-02-24 PROCEDURE — 92542 POSITIONAL NYSTAGMUS TEST: CPT | Mod: HCNC,S$GLB,, | Performed by: AUDIOLOGIST-HEARING AID FITTER

## 2020-02-24 PROCEDURE — 1126F PR PAIN SEVERITY QUANTIFIED, NO PAIN PRESENT: ICD-10-PCS | Mod: HCNC,S$GLB,, | Performed by: INTERNAL MEDICINE

## 2020-02-24 PROCEDURE — 92557 PR COMPREHENSIVE HEARING TEST: ICD-10-PCS | Mod: HCNC,S$GLB,, | Performed by: AUDIOLOGIST-HEARING AID FITTER

## 2020-02-24 PROCEDURE — 1101F PT FALLS ASSESS-DOCD LE1/YR: CPT | Mod: HCNC,CPTII,S$GLB, | Performed by: INTERNAL MEDICINE

## 2020-02-24 PROCEDURE — 3074F SYST BP LT 130 MM HG: CPT | Mod: HCNC,CPTII,S$GLB, | Performed by: INTERNAL MEDICINE

## 2020-02-24 PROCEDURE — 3078F DIAST BP <80 MM HG: CPT | Mod: HCNC,CPTII,S$GLB, | Performed by: INTERNAL MEDICINE

## 2020-02-24 PROCEDURE — 99999 PR PBB SHADOW E&M-EST. PATIENT-LVL V: CPT | Mod: PBBFAC,HCNC,, | Performed by: INTERNAL MEDICINE

## 2020-02-24 PROCEDURE — 99214 PR OFFICE/OUTPT VISIT, EST, LEVL IV, 30-39 MIN: ICD-10-PCS | Mod: HCNC,S$GLB,, | Performed by: INTERNAL MEDICINE

## 2020-02-24 PROCEDURE — 3078F PR MOST RECENT DIASTOLIC BLOOD PRESSURE < 80 MM HG: ICD-10-PCS | Mod: HCNC,CPTII,S$GLB, | Performed by: INTERNAL MEDICINE

## 2020-02-24 PROCEDURE — 99999 PR PBB SHADOW E&M-EST. PATIENT-LVL V: ICD-10-PCS | Mod: PBBFAC,HCNC,, | Performed by: INTERNAL MEDICINE

## 2020-02-24 PROCEDURE — 99214 OFFICE O/P EST MOD 30 MIN: CPT | Mod: HCNC,S$GLB,, | Performed by: INTERNAL MEDICINE

## 2020-02-24 PROCEDURE — 1159F PR MEDICATION LIST DOCUMENTED IN MEDICAL RECORD: ICD-10-PCS | Mod: HCNC,S$GLB,, | Performed by: INTERNAL MEDICINE

## 2020-02-24 PROCEDURE — 1101F PR PT FALLS ASSESS DOC 0-1 FALLS W/OUT INJ PAST YR: ICD-10-PCS | Mod: HCNC,CPTII,S$GLB, | Performed by: INTERNAL MEDICINE

## 2020-02-24 PROCEDURE — 92542 PR POSITIONAL NYSTAGMUS TEST: ICD-10-PCS | Mod: HCNC,S$GLB,, | Performed by: AUDIOLOGIST-HEARING AID FITTER

## 2020-02-24 PROCEDURE — 92557 COMPREHENSIVE HEARING TEST: CPT | Mod: HCNC,S$GLB,, | Performed by: AUDIOLOGIST-HEARING AID FITTER

## 2020-02-24 PROCEDURE — 99499 RISK ADDL DX/OHS AUDIT: ICD-10-PCS | Mod: HCNC,S$GLB,, | Performed by: INTERNAL MEDICINE

## 2020-02-24 PROCEDURE — 92567 PR TYMPA2METRY: ICD-10-PCS | Mod: HCNC,S$GLB,, | Performed by: AUDIOLOGIST-HEARING AID FITTER

## 2020-02-24 NOTE — PROGRESS NOTES
Physical Therapy Daily Treatment Note     Name: Violet Mcnally [facility]  Clinic Number: [id number]    Therapy Diagnosis:   Encounter Diagnosis   Name Primary?    Chronic right shoulder pain      Physician: Pamela Farr,*    Visit Date: 2/19/2020    Physician Orders: PT Eval and Treat   Medical Diagnosis from Referral:  Evaluation Date: 1/31/2020  Authorization Period Expiration: Right rotator cuff tendinitis  Plan of Care Expiration:3/1/2020  Visit # / Visits authorized: 5/20    Time In: 1100  Time Out: 1155  Total Billable Time: 40 minutes    Precautions: pacemaker    Subjective     Pt reports: swelling left shoulder with increase in pain.   States it started last week.  She was compliant with home exercise program.  Response to previous treatment: N/A  Functional change: N/A    Pain: 1/10  Location: right shoulder      Objective     Violet received therapeutic exercises to develop strength, ROM and posture for 30 minutes including:  UBE  Cable rows 10#  Wall glide shoulder flexion  Iso. Shoulder IR/ER  Over head pulleys  Supine shoulder flexion  Prone shoulder flexion    Home Exercises Provided and Patient Education Provided     Education provided:   - Reviewed home program    Written Home Exercises Provided: Patient instructed to cont prior HEP.  Exercises were reviewed and Violet was able to demonstrate them prior to the end of the session.  Violet demonstrated good  understanding of the education provided.     See EMR under Media for exercises provided prior visit.    Assessment     Violet has localized swelling right deltoid with pain.  Her program was reduced today with removal of resisted IR/ER of the shoulder.   The area was tender to touch and increased pain with IR/ER exs    Violet is progressing well towards her goals.   Pt prognosis is Good.     Pt will continue to benefit from skilled outpatient physical therapy to address the deficits listed in the problem list box on initial  evaluation, provide pt/family education and to maximize pt's level of independence in the home and community environment.     Pt's spiritual, cultural and educational needs considered and pt agreeable to plan of care and goals.     Anticipated barriers to physical therapy: none    Goals: Short Term Goals: In 3 weeks   1.I with HEP  2.Patient to increase GH ROM from 90 to 100 degrees flexion    3.Patient to increase MMT strength from 3-/5 to 3/5    4.Patient to have pain less than 4/10 at all times.     Long Term Goals: In 6 weeks  1. Patient to demo increase in UE strength to 3+/5  2. Patient to have decreased pain to 2/10 at all times.  3. Patient to demo increase GH ROM to 120  4. Patient to perform daily activities including raising arm and lifting without limitation.    Plan     Plan of care Certification: 1/31/2020 to 3/1/2020.     Outpatient Physical Therapy 2 times weekly for 6 weeks to include the following interventions: Manual Therapy, Moist Heat/ Ice, Patient Education, Therapeutic Activites and Therapeutic Exercise.        iT Chance, PT

## 2020-02-24 NOTE — PROGRESS NOTES
"Referring provider: Dr. Patricia Pepefrance Swenson was seen 02/24/2020 for an audiological evaluation and a vestibular screen.  Patient complains of spinning that worsened following a fall and hitting her head a few weeks ago.  She note problems with imbalance for several months, and occasional generalized dizziness.  She had a fall following misstep hitting the front of her head on floor several months ago and noticed a brief worsening in dizziness.  However, a few weeks ago her  fell into her which pushed her down, hitting the left side of her head hardly against the wall.  Following this fall, she began experiencing intense vertigo primarily upon lying or turning in bed, or upon standing. She feels like "both her head and body are spinning" and lasts for a few seconds.  She has accompanying nausea.  She does not appreciate a change in hearing.  She has known hearing loss, right poorer than left.  Her last audiogram was June 2018.  She also complains of constant "pulsing sensation" like her "heartbeat" in the right ear that started several months ago.  Patient uses flonase as needed.     Audiology Report:  Results reveal a moderate mixed hearing loss 250-500 Hz and mild-to-moderate sensorineural hearing loss 7702-1361 Hz for the right ear, and a mild-to-moderate sensorineural hearing loss 250-8000 Hz for the left ear.   Speech Reception Thresholds were 30 dBHL for the right ear and 30 dBHL for the left ear.   Word recognition scores were excellent for the right ear and excellent for the left ear.   Tympanograms were Type As, shallow for the right ear and Type As, shallow for the left ear.    Videonystagmography Screen (VNG):  Spontaneous test was absent for nystagmus.  Static Positional test was absent for nystagmus.   Head hanging forward: Absent   Sitting head right: Absent   Sitting head left: Absent  [place] Right was negative for BPPV.  [place] Left was positive for BPPV: 12 d/s " upward and leftward nystagmus that fatigued with dizziness.     Summary: BPPV, left ear.  A 5-position Epley maneuver was performed for the left ear.  Patient tolerated the maneuver well and was asymptomatic upon discharge.  Post maneuver instructions were given to the patient both verbally and written.  Understanding was voiced.    Recommendations:  1. Head restrictions following Epley, one week   2. Recheck in one week to ensure BPPV is resolved.  If dizziness persists after BPPV is resolved, will consider diagnostic VNG.  3. ENT due to mixed (conductive +SN) hearing loss and pulsatile tinnitus in the right ear.  Appointment is scheduled after audio f/u visit.   4. Patient is a candidate for hearing aids, when motivated.     Patient was counseled on the above findings.  Tracings are to be scanned.

## 2020-02-24 NOTE — PROGRESS NOTES
Subjective:       Patient ID: Violet Swenson is a 80 y.o. White female who presents for new evaluation of Chronic Kidney Disease and Vitamin D Deficiency    Hypertension   Pertinent negatives include no chest pain, headaches, palpitations or shortness of breath.        Patient is a 78-year-old female with history of hypertension and lymphoma.  Patient had chemotherapy 2017 resulting in congestive heart failure and cardiomyopathy.  Patient has had rise in LFTs.patient had been taking Motrin which 15 mg but was seen by Dr. PANIAGUA in rheumatology who reduced the Mobic down to 7.5 mg.  Patient has been seen by Dr. Moralse in hepatology for increased LFTs.    February 2018 patient evaluated for rising creatinine.  Workup ordered.  Noted left sided atrophic kidney and lobulated kidney on the right side which is enlarged based on the CT scan from 2017 09/2018 s/p falls x 3 in last few weeks ; renal function stable     2/2020 severe dizziness consult audiology       Review of Systems   Constitutional: Negative for activity change, appetite change, chills, diaphoresis, fatigue, fever and unexpected weight change.   HENT: Negative for congestion, dental problem, drooling, postnasal drip, rhinorrhea and voice change.    Eyes: Negative for discharge.   Respiratory: Negative for apnea, cough, choking, chest tightness, shortness of breath, wheezing and stridor.    Cardiovascular: Negative for chest pain, palpitations and leg swelling.   Gastrointestinal: Negative for abdominal distention, blood in stool, constipation, diarrhea, nausea, rectal pain and vomiting.   Endocrine: Negative for cold intolerance, heat intolerance, polydipsia and polyuria.   Genitourinary: Negative for decreased urine volume, difficulty urinating, dysuria, enuresis, flank pain, frequency, hematuria and urgency.   Musculoskeletal: Positive for arthralgias, joint swelling and myalgias. Negative for back pain and gait problem.   Skin: Negative for  "rash.   Allergic/Immunologic: Negative for food allergies and immunocompromised state.   Neurological: Negative for dizziness, tremors, syncope, numbness and headaches.   Hematological: Does not bruise/bleed easily.   Psychiatric/Behavioral: Negative for agitation, behavioral problems and self-injury. The patient is not nervous/anxious and is not hyperactive.    All other systems reviewed and are negative.      Objective:   /60   Pulse 68   Resp 20   Ht 5' 4" (1.626 m)   Wt 62.5 kg (137 lb 12.6 oz)   BMI 23.65 kg/m²      Physical Exam   Constitutional: She is oriented to person, place, and time. No distress.   HENT:   Head: Normocephalic and atraumatic.   Nose: Nose normal.   Severe vertigo    Eyes: Pupils are equal, round, and reactive to light. Conjunctivae and EOM are normal.   Neck: Normal range of motion. No JVD present. No tracheal deviation present. No thyromegaly present.   Cardiovascular: Normal rate, regular rhythm, normal heart sounds and intact distal pulses. Exam reveals no gallop and no friction rub.   No murmur heard.  Pulmonary/Chest: Effort normal and breath sounds normal. No respiratory distress. She has no wheezes. She has no rales. She exhibits no tenderness.   Abdominal: Soft. Bowel sounds are normal. She exhibits no distension and no mass. There is no tenderness. No hernia.   Musculoskeletal: Normal range of motion. She exhibits no edema, tenderness or deformity.   Walks with walker    Neurological: She is alert and oriented to person, place, and time. She has normal reflexes. She displays normal reflexes. No cranial nerve deficit. She exhibits normal muscle tone. Coordination normal.   Skin: Skin is warm. She is not diaphoretic. No erythema. There is pallor.   Psychiatric: She has a normal mood and affect. Her behavior is normal. Judgment and thought content normal.   Nursing note and vitals reviewed.        Lab Results   Component Value Date    CREATININE 1.5 (H) 12/02/2019    BUN " 28 (H) 12/02/2019     12/02/2019    K 4.2 12/02/2019     (H) 12/02/2019    CO2 23 12/02/2019     Lab Results   Component Value Date    WBC 7.80 12/02/2019    HGB 10.3 (L) 12/02/2019    HCT 34.4 (L) 12/02/2019     (H) 12/02/2019     12/02/2019     Lab Results   Component Value Date    .0 (H) 12/02/2019    CALCIUM 9.1 12/02/2019    PHOS 3.8 12/02/2019        Assessment:    )    1. Chronic kidney disease (CKD) stage G3a/A1, moderately decreased glomerular filtration rate (GFR) between 45-59 mL/min/1.73 square meter and albuminuria creatinine ratio less than 30 mg/g    2. Small kidney, unilateral    3. Lobulated kidney    4. Analgesic nephropathy    5. Acidosis    6. Hyperkalemia    7. HPTH (hyperparathyroidism)    8. Vitamin D deficiency        Plan:        1. Chronic kidney disease stage III:; GFR 30% overall stable kidney function the last few years    2.  Small size kidneys/atrophic kidney on the left side:no renal artery stenosis     3. Vit D def:+ sec HPT: on weekly Vit D +  calcitriol 0.25 mcg MWF :  Calcium and phosphorus are staying stable    4. CKD-Stage III: no proteinuria ; watch on mobic    5. Hyperkalemia: due to acidosis; off cozaar; mobic and CKD stageIII:  Stable as of now            follow-up 6 -12 months

## 2020-02-26 ENCOUNTER — OFFICE VISIT (OUTPATIENT)
Dept: HEMATOLOGY/ONCOLOGY | Facility: CLINIC | Age: 80
End: 2020-02-26
Payer: MEDICARE

## 2020-02-26 ENCOUNTER — PATIENT OUTREACH (OUTPATIENT)
Dept: ADMINISTRATIVE | Facility: OTHER | Age: 80
End: 2020-02-26

## 2020-02-26 ENCOUNTER — LAB VISIT (OUTPATIENT)
Dept: LAB | Facility: HOSPITAL | Age: 80
End: 2020-02-26
Attending: INTERNAL MEDICINE
Payer: MEDICARE

## 2020-02-26 VITALS
WEIGHT: 145.5 LBS | DIASTOLIC BLOOD PRESSURE: 91 MMHG | SYSTOLIC BLOOD PRESSURE: 135 MMHG | OXYGEN SATURATION: 93 % | BODY MASS INDEX: 24.84 KG/M2 | HEART RATE: 87 BPM | TEMPERATURE: 97 F | HEIGHT: 64 IN | RESPIRATION RATE: 18 BRPM

## 2020-02-26 DIAGNOSIS — C85.83 LARGE CELL LYMPHOMA OF INTRA-ABDOMINAL LYMPH NODES: Primary | Chronic | ICD-10-CM

## 2020-02-26 DIAGNOSIS — C85.83 LARGE CELL LYMPHOMA OF INTRA-ABDOMINAL LYMPH NODES: ICD-10-CM

## 2020-02-26 DIAGNOSIS — D64.9 CHRONIC ANEMIA: Chronic | ICD-10-CM

## 2020-02-26 DIAGNOSIS — L29.9 ITCHING: ICD-10-CM

## 2020-02-26 LAB
ALBUMIN SERPL BCP-MCNC: 3.3 G/DL (ref 3.5–5.2)
ALP SERPL-CCNC: 100 U/L (ref 55–135)
ALT SERPL W/O P-5'-P-CCNC: 47 U/L (ref 10–44)
ANION GAP SERPL CALC-SCNC: 6 MMOL/L (ref 8–16)
AST SERPL-CCNC: 46 U/L (ref 10–40)
BASOPHILS # BLD AUTO: 0.02 K/UL (ref 0–0.2)
BASOPHILS NFR BLD: 0.2 % (ref 0–1.9)
BILIRUB SERPL-MCNC: 0.2 MG/DL (ref 0.1–1)
BUN SERPL-MCNC: 36 MG/DL (ref 8–23)
CALCIUM SERPL-MCNC: 8.6 MG/DL (ref 8.7–10.5)
CHLORIDE SERPL-SCNC: 111 MMOL/L (ref 95–110)
CO2 SERPL-SCNC: 23 MMOL/L (ref 23–29)
CREAT SERPL-MCNC: 1.6 MG/DL (ref 0.5–1.4)
DIFFERENTIAL METHOD: ABNORMAL
EOSINOPHIL # BLD AUTO: 0.2 K/UL (ref 0–0.5)
EOSINOPHIL NFR BLD: 1.8 % (ref 0–8)
ERYTHROCYTE [DISTWIDTH] IN BLOOD BY AUTOMATED COUNT: 15.8 % (ref 11.5–14.5)
EST. GFR  (AFRICAN AMERICAN): 35.1 ML/MIN/1.73 M^2
EST. GFR  (NON AFRICAN AMERICAN): 30.4 ML/MIN/1.73 M^2
GLUCOSE SERPL-MCNC: 60 MG/DL (ref 70–110)
HCT VFR BLD AUTO: 33.4 % (ref 37–48.5)
HGB BLD-MCNC: 10.4 G/DL (ref 12–16)
IMM GRANULOCYTES # BLD AUTO: 0.06 K/UL (ref 0–0.04)
IMM GRANULOCYTES NFR BLD AUTO: 0.7 % (ref 0–0.5)
LYMPHOCYTES # BLD AUTO: 3.7 K/UL (ref 1–4.8)
LYMPHOCYTES NFR BLD: 40.8 % (ref 18–48)
MCH RBC QN AUTO: 33.8 PG (ref 27–31)
MCHC RBC AUTO-ENTMCNC: 31.1 G/DL (ref 32–36)
MCV RBC AUTO: 108 FL (ref 82–98)
MONOCYTES # BLD AUTO: 0.8 K/UL (ref 0.3–1)
MONOCYTES NFR BLD: 8.7 % (ref 4–15)
NEUTROPHILS # BLD AUTO: 4.4 K/UL (ref 1.8–7.7)
NEUTROPHILS NFR BLD: 48.5 % (ref 38–73)
NRBC BLD-RTO: 0 /100 WBC
PLATELET # BLD AUTO: 221 K/UL (ref 150–350)
PMV BLD AUTO: 9.2 FL (ref 9.2–12.9)
POTASSIUM SERPL-SCNC: 5.2 MMOL/L (ref 3.5–5.1)
PROT SERPL-MCNC: 6.6 G/DL (ref 6–8.4)
RBC # BLD AUTO: 3.08 M/UL (ref 4–5.4)
SODIUM SERPL-SCNC: 140 MMOL/L (ref 136–145)
WBC # BLD AUTO: 9.1 K/UL (ref 3.9–12.7)

## 2020-02-26 PROCEDURE — 3075F SYST BP GE 130 - 139MM HG: CPT | Mod: HCNC,CPTII,S$GLB, | Performed by: INTERNAL MEDICINE

## 2020-02-26 PROCEDURE — 3080F PR MOST RECENT DIASTOLIC BLOOD PRESSURE >= 90 MM HG: ICD-10-PCS | Mod: HCNC,CPTII,S$GLB, | Performed by: INTERNAL MEDICINE

## 2020-02-26 PROCEDURE — 3080F DIAST BP >= 90 MM HG: CPT | Mod: HCNC,CPTII,S$GLB, | Performed by: INTERNAL MEDICINE

## 2020-02-26 PROCEDURE — 1101F PR PT FALLS ASSESS DOC 0-1 FALLS W/OUT INJ PAST YR: ICD-10-PCS | Mod: HCNC,CPTII,S$GLB, | Performed by: INTERNAL MEDICINE

## 2020-02-26 PROCEDURE — 99999 PR PBB SHADOW E&M-EST. PATIENT-LVL V: ICD-10-PCS | Mod: PBBFAC,HCNC,, | Performed by: INTERNAL MEDICINE

## 2020-02-26 PROCEDURE — 99214 PR OFFICE/OUTPT VISIT, EST, LEVL IV, 30-39 MIN: ICD-10-PCS | Mod: HCNC,S$GLB,, | Performed by: INTERNAL MEDICINE

## 2020-02-26 PROCEDURE — 99499 RISK ADDL DX/OHS AUDIT: ICD-10-PCS | Mod: HCNC,S$GLB,, | Performed by: INTERNAL MEDICINE

## 2020-02-26 PROCEDURE — 99499 UNLISTED E&M SERVICE: CPT | Mod: HCNC,S$GLB,, | Performed by: INTERNAL MEDICINE

## 2020-02-26 PROCEDURE — 1126F AMNT PAIN NOTED NONE PRSNT: CPT | Mod: HCNC,S$GLB,, | Performed by: INTERNAL MEDICINE

## 2020-02-26 PROCEDURE — 1126F PR PAIN SEVERITY QUANTIFIED, NO PAIN PRESENT: ICD-10-PCS | Mod: HCNC,S$GLB,, | Performed by: INTERNAL MEDICINE

## 2020-02-26 PROCEDURE — 1159F MED LIST DOCD IN RCRD: CPT | Mod: HCNC,S$GLB,, | Performed by: INTERNAL MEDICINE

## 2020-02-26 PROCEDURE — 99999 PR PBB SHADOW E&M-EST. PATIENT-LVL V: CPT | Mod: PBBFAC,HCNC,, | Performed by: INTERNAL MEDICINE

## 2020-02-26 PROCEDURE — 99214 OFFICE O/P EST MOD 30 MIN: CPT | Mod: HCNC,S$GLB,, | Performed by: INTERNAL MEDICINE

## 2020-02-26 PROCEDURE — 3075F PR MOST RECENT SYSTOLIC BLOOD PRESS GE 130-139MM HG: ICD-10-PCS | Mod: HCNC,CPTII,S$GLB, | Performed by: INTERNAL MEDICINE

## 2020-02-26 PROCEDURE — 1159F PR MEDICATION LIST DOCUMENTED IN MEDICAL RECORD: ICD-10-PCS | Mod: HCNC,S$GLB,, | Performed by: INTERNAL MEDICINE

## 2020-02-26 PROCEDURE — 85025 COMPLETE CBC W/AUTO DIFF WBC: CPT | Mod: HCNC

## 2020-02-26 PROCEDURE — 1101F PT FALLS ASSESS-DOCD LE1/YR: CPT | Mod: HCNC,CPTII,S$GLB, | Performed by: INTERNAL MEDICINE

## 2020-02-26 PROCEDURE — 80053 COMPREHEN METABOLIC PANEL: CPT | Mod: HCNC

## 2020-02-26 PROCEDURE — 36415 COLL VENOUS BLD VENIPUNCTURE: CPT | Mod: HCNC

## 2020-02-26 NOTE — PROGRESS NOTES
Subjective:       Patient ID: Violet Swenson is a 80 y.o. female.    Chief Complaint: Follow-up    HPI This 78 year old  lady  comes for follow up of her th diffuse large cell lymphoma.   A CT of   the abdomen done on 04/05/2017, done because of abdominal pain ,  was reported as showing a 6.9 x 7.0 x 8.4 cm soft  tissue mass in the right lower quadrant in the terminal ileum abutting the   cecum. There was adjacent conglomerate adenopathy in the right lower quadrant   mesentery.     The patient had a colonoscopy that showed a lesion in the terminal ileum.     She underwent a right hemicolectomy and terminal ileum removal. 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  She had a Ct/PET that showed evidence of surgery and some non specific findings, but no evidence of active disease elsewhere.  An ECHO showed normal cardiac ejection fraction, as well as a MUGA.  She was seen cardiology and cleared for ADRIAMYCIN administration.  She was negative for Hep B/C and HIV  Bone marrow was negative.     The patient started  R-CHOP. Treatment  She tolerated the treatment with some minor difficulties. After 3 cycles, she had a repeat CT/PET  There was no activity in the abdomen.  There was development of ground glass opacities/infiltrates in both lungs which were hypermetabolic although the SUV was not reported.  We discussed the imaging studies with Radiology and Pulmonary.  Dr Corral of the Pulmonary Department favored the findings to be due to pulmonary congestion.  Her troponin was  normal, but her BNP was markedly elevated at 1,103.  She was started on Lasix by dr Corral and asked to have a  Ct in few weeks  The patient   had evaluation by Cardiology ( dr Gil).It was felt had developed CHF., with a decrease in her ejection traction to 47%., elevated BNP and imaging studies.  The patient indicated her leg  edema and SOB   improved on lasix. Repeat PET showed  clearance of all the infiltrates  Since, she has had a complete work including cardiac catheterization with negative findings.  A She comes for follow up. She had a CT/PET in 2018 that shows no evidence of recurrence. Repeat CT/PET 2018  And 2019 showed also no evidence of recurrence     follwing her chemotherapy she has had a macrocytic anemia with normal b12/folate levels.  She continues to complain of itching. It is generalized and does not seem to respond to moisturizers, Claritin or Benadryl.  Because of it and the potential association with a recurrence of the ;ympjhoma , she was asked to have a CT/PET done before the original planned date.   The Ct/PET done 2019 showed no evidence of recurrnece,.  She says her generalized pruritus has improved with the recommendations by Dermatology   ALLERGIES:see med card     MEDICATIONS: See MedCard.     PREVIOUS SURGERIES: Appendectomy in , tonsillectomy at age 19, gastric   bypass with incidental cholecystectomy, bilateral knee replacement in .     SOCIAL HISTORY: She is . She had two natural children, and one adopted   one. The adopted child has . She lives in [place] with her   . She smoked for two years, averaging a pack a day. She stopped 35   years ago or so. Denies any alcohol intake. She worked in accounting.     FAMILY HISTORY: Father  of colon cancer. Younger brother had leukemia that  transform into a lymphoma. Sister had pancreatic cancer          Review of Systems   Constitutional: Negative.    HENT: Negative.    Eyes: Negative.    Respiratory: Negative.  Negative for cough and wheezing.    Cardiovascular: Negative.  Negative for chest pain.   Gastrointestinal: Negative.    Genitourinary: Negative.    Neurological: Negative.    Psychiatric/Behavioral: Negative.        Objective:      Physical Exam   Constitutional: She is oriented to person, place, and time. She appears well-developed. No distress.   HENT:    Head: Normocephalic.   Right Ear: Tympanic membrane, external ear and ear canal normal.   Left Ear: Tympanic membrane, external ear and ear canal normal.   Nose: Nose normal. Right sinus exhibits no maxillary sinus tenderness and no frontal sinus tenderness. Left sinus exhibits no maxillary sinus tenderness and no frontal sinus tenderness.   Mouth/Throat: Oropharynx is clear and moist and mucous membranes are normal.   Teeth normal.  Gums normal.   Eyes: Pupils are equal, round, and reactive to light. Conjunctivae and lids are normal.   Neck: Normal carotid pulses, no hepatojugular reflux and no JVD present. Carotid bruit is not present. No tracheal deviation present. No thyroid mass and no thyromegaly present.   Cardiovascular: Normal rate, regular rhythm, S1 normal, S2 normal, normal heart sounds and intact distal pulses. Exam reveals no gallop and no friction rub.   No murmur heard.  Carotid exam normal   Pulmonary/Chest: Effort normal and breath sounds normal. No accessory muscle usage. No respiratory distress. She has no wheezes. She has no rales. She exhibits no tenderness.   Abdominal: Soft. Normal appearance. She exhibits no distension and no mass. There is no splenomegaly or hepatomegaly. There is no tenderness. There is no rebound and no guarding.   Musculoskeletal: Normal range of motion. She exhibits no edema or tenderness.        Right hand: Normal.        Left hand: Normal.       Lymphadenopathy:     She has no cervical adenopathy.     She has no axillary adenopathy.        Right: No inguinal and no supraclavicular adenopathy present.        Left: No inguinal and no supraclavicular adenopathy present.   Neurological: She is alert and oriented to person, place, and time. She has normal strength. No cranial nerve deficit. Coordination normal.   Skin: Skin is warm and dry. No rash noted. She is not diaphoretic. No cyanosis or erythema. No pallor. Nails show no clubbing.   Psychiatric: She has a normal  mood and affect. Her behavior is normal. Judgment and thought content normal.       Wt Readings from Last 3 Encounters:   02/26/20 66 kg (145 lb 8.1 oz)   02/24/20 62.5 kg (137 lb 12.6 oz)   02/12/20 64.6 kg (142 lb 6.7 oz)     Temp Readings from Last 3 Encounters:   02/26/20 96.8 °F (36 °C) (Oral)   02/12/20 97.7 °F (36.5 °C) (Tympanic)   01/06/20 97.3 °F (36.3 °C) (Tympanic)     BP Readings from Last 3 Encounters:   02/26/20 (!) 135/91   02/24/20 100/60   02/12/20 108/70     Pulse Readings from Last 3 Encounters:   02/26/20 87   02/24/20 68   02/12/20 72       Assessment:       1. 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 Plan is to keep monitoring her counts very 3 months and repeat Ct/PET approximately once a year.  She will be asked to see me in 3 months with a cbc/cmp/feritin/tibc and LDH  Contonie recommendations by Dermatology regarding her generalized pruritus

## 2020-02-28 DIAGNOSIS — D50.8 OTHER IRON DEFICIENCY ANEMIA: ICD-10-CM

## 2020-03-02 ENCOUNTER — OFFICE VISIT (OUTPATIENT)
Dept: ORTHOPEDICS | Facility: CLINIC | Age: 80
End: 2020-03-02
Payer: MEDICARE

## 2020-03-02 VITALS
HEIGHT: 64 IN | DIASTOLIC BLOOD PRESSURE: 76 MMHG | SYSTOLIC BLOOD PRESSURE: 116 MMHG | WEIGHT: 145 LBS | HEART RATE: 77 BPM | BODY MASS INDEX: 24.75 KG/M2

## 2020-03-02 DIAGNOSIS — S40.021D TRAUMATIC HEMATOMA OF RIGHT UPPER ARM, SUBSEQUENT ENCOUNTER: ICD-10-CM

## 2020-03-02 DIAGNOSIS — M25.512 ACUTE PAIN OF LEFT SHOULDER: Primary | ICD-10-CM

## 2020-03-02 DIAGNOSIS — F41.9 ANXIETY: ICD-10-CM

## 2020-03-02 PROCEDURE — 3074F SYST BP LT 130 MM HG: CPT | Mod: HCNC,CPTII,S$GLB, | Performed by: PHYSICIAN ASSISTANT

## 2020-03-02 PROCEDURE — 1159F PR MEDICATION LIST DOCUMENTED IN MEDICAL RECORD: ICD-10-PCS | Mod: HCNC,S$GLB,, | Performed by: PHYSICIAN ASSISTANT

## 2020-03-02 PROCEDURE — 99213 OFFICE O/P EST LOW 20 MIN: CPT | Mod: HCNC,S$GLB,, | Performed by: PHYSICIAN ASSISTANT

## 2020-03-02 PROCEDURE — 1159F MED LIST DOCD IN RCRD: CPT | Mod: HCNC,S$GLB,, | Performed by: PHYSICIAN ASSISTANT

## 2020-03-02 PROCEDURE — 1126F PR PAIN SEVERITY QUANTIFIED, NO PAIN PRESENT: ICD-10-PCS | Mod: HCNC,S$GLB,, | Performed by: PHYSICIAN ASSISTANT

## 2020-03-02 PROCEDURE — 99213 PR OFFICE/OUTPT VISIT, EST, LEVL III, 20-29 MIN: ICD-10-PCS | Mod: HCNC,S$GLB,, | Performed by: PHYSICIAN ASSISTANT

## 2020-03-02 PROCEDURE — 3078F PR MOST RECENT DIASTOLIC BLOOD PRESSURE < 80 MM HG: ICD-10-PCS | Mod: HCNC,CPTII,S$GLB, | Performed by: PHYSICIAN ASSISTANT

## 2020-03-02 PROCEDURE — 1101F PR PT FALLS ASSESS DOC 0-1 FALLS W/OUT INJ PAST YR: ICD-10-PCS | Mod: HCNC,CPTII,S$GLB, | Performed by: PHYSICIAN ASSISTANT

## 2020-03-02 PROCEDURE — 3074F PR MOST RECENT SYSTOLIC BLOOD PRESSURE < 130 MM HG: ICD-10-PCS | Mod: HCNC,CPTII,S$GLB, | Performed by: PHYSICIAN ASSISTANT

## 2020-03-02 PROCEDURE — 1101F PT FALLS ASSESS-DOCD LE1/YR: CPT | Mod: HCNC,CPTII,S$GLB, | Performed by: PHYSICIAN ASSISTANT

## 2020-03-02 PROCEDURE — 3078F DIAST BP <80 MM HG: CPT | Mod: HCNC,CPTII,S$GLB, | Performed by: PHYSICIAN ASSISTANT

## 2020-03-02 PROCEDURE — 99999 PR PBB SHADOW E&M-EST. PATIENT-LVL V: ICD-10-PCS | Mod: PBBFAC,HCNC,, | Performed by: PHYSICIAN ASSISTANT

## 2020-03-02 PROCEDURE — 99999 PR PBB SHADOW E&M-EST. PATIENT-LVL V: CPT | Mod: PBBFAC,HCNC,, | Performed by: PHYSICIAN ASSISTANT

## 2020-03-02 PROCEDURE — 1126F AMNT PAIN NOTED NONE PRSNT: CPT | Mod: HCNC,S$GLB,, | Performed by: PHYSICIAN ASSISTANT

## 2020-03-02 RX ORDER — BUSPIRONE HYDROCHLORIDE 7.5 MG/1
7.5 TABLET ORAL 3 TIMES DAILY
Qty: 90 TABLET | Refills: 0 | Status: SHIPPED | OUTPATIENT
Start: 2020-03-02 | End: 2020-03-09

## 2020-03-02 RX ORDER — IRON,CARBONYL/ASCORBIC ACID 100-250 MG
TABLET ORAL
Qty: 30 TABLET | Refills: 3 | Status: SHIPPED | OUTPATIENT
Start: 2020-03-02 | End: 2020-06-30

## 2020-03-02 NOTE — PROGRESS NOTES
Patient ID: Violet Swenson is a 80 y.o. female.    Chief Complaint: Pain of the Right Shoulder      HPI: Violet Swenson  is a 80 y.o. female who c/o Pain of the Right Shoulder   for duration of the nearly 2 months.  He comes in today for routine follow-up of the right shoulder.  She initially injured it when her  fell into her in the garage and she landed against the wall.  She has been working with physical therapy.  Pain level is 0/10 today.  She has intermittent soreness.  Her main complaint is that she has swelling any time she lifts the right shoulder up.  It gets more sore any time she tries to lift things.  Alleviating factors include time. Aggravating factors as above.    Past Medical History:   Diagnosis Date    Age-related osteoporosis without current pathological fracture 8/20/2018    Anemia     Anxiety     Arthritis     Atrial flutter     Cancer     lymphoma Large cell B    CHF (congestive heart failure)     Chronic anemia 4/26/2017    Chronic midline low back pain with right-sided sciatica 8/20/2018    Coronary artery disease     01/2015 [facility] patent LCX. 50% stenosis in LAD and RCA.      Depression     Disorder of kidney and ureter     Encounter for blood transfusion     GERD (gastroesophageal reflux disease)     Gout, arthritis     Heart failure     Hx of psychiatric care     Hyperlipidemia     Hypertension     Hypothyroidism     Immune deficiency disorder     Kidney disease     Lung nodule 2014    RML--stable    Obesity     Pacemaker     Metronic    Paroxysmal atrial fibrillation 3/15/2018    Pneumonia     Polyneuropathy     chemo induced    Psychiatric problem     Tobacco dependence     quit 1976    Trouble in sleeping      Past Surgical History:   Procedure Laterality Date    APPENDECTOMY  1966 approx    CARDIAC PACEMAKER PLACEMENT  01/22/2015    CHOLECYSTECTOMY  1993    incidental at time of gastric bypass    COLON SURGERY Right 2017  "   hemicolectomy    COLONOSCOPY N/A 4/6/2017    Procedure: COLONOSCOPY;  Surgeon: Tye Enamorado MD;  Location: [facility] ENDO;  Service: Endoscopy;  Laterality: N/A;    COLONOSCOPY N/A 11/28/2018    Procedure: COLONOSCOPY;  Surgeon: Saúl Arthur III, MD;  Location: [facility] ENDO;  Service: Endoscopy;  Laterality: N/A;    CORONARY ANGIOPLASTY  02/2014    CORONARY STENT PLACEMENT  02/05/2014    ESOPHAGOGASTRODUODENOSCOPY N/A 11/28/2018    Procedure: EGD (ESOPHAGOGASTRODUODENOSCOPY);  Surgeon: Saúl Arthur III, MD;  Location: [facility] ENDO;  Service: Endoscopy;  Laterality: N/A;    GASTRIC BYPASS  1993    with incidental choly    HERNIA REPAIR      JOINT REPLACEMENT Bilateral 2009    3 months apart    TONSILLECTOMY  1959     Family History   Problem Relation Age of Onset    Heart disease Mother     Hypertension Mother     Cataracts Mother     Stomach cancer Father         "ulcers that turned to cancer"    Cancer Father         stomach    Pancreatic cancer Sister     Cancer Sister         pancreatic    Leukemia Brother         "leukemia which led to intestinal cancer"    Cataracts Brother     Cancer Brother         leukemia then later stomach cancer    Drug abuse Son     Cancer Son         prostate cancer    Prostate cancer Son     COPD Daughter     Asthma Daughter     Hypertension Maternal Grandfather     Stroke Maternal Grandfather     Alcohol abuse Neg Hx     Diabetes Neg Hx     Mental retardation Neg Hx     Mental illness Neg Hx      Social History     Socioeconomic History    Marital status:      Spouse name: Not on file    Number of children: 4    Years of education: Not on file    Highest education level: Not on file   Occupational History    Not on file   Social Needs    Financial resource strain: Not on file    Food insecurity:     Worry: Not on file     Inability: Not on file    Transportation needs:     Medical: Not on file     Non-medical: Not on file   Tobacco Use "    Smoking status: Former Smoker     Packs/day: 2.00     Years: 6.00     Pack years: 12.00     Last attempt to quit: 3/15/1976     Years since quittin.9    Smokeless tobacco: Never Used   Substance and Sexual Activity    Alcohol use: No     Alcohol/week: 0.0 standard drinks    Drug use: No    Sexual activity: Never   Lifestyle    Physical activity:     Days per week: Not on file     Minutes per session: Not on file    Stress: Not on file   Relationships    Social connections:     Talks on phone: Not on file     Gets together: Not on file     Attends Jainism service: Not on file     Active member of club or organization: Not on file     Attends meetings of clubs or organizations: Not on file     Relationship status: Not on file   Other Topics Concern    Patient feels they ought to cut down on drinking/drug use Not Asked    Patient annoyed by others criticizing their drinking/drug use Not Asked    Patient has felt bad or guilty about drinking/drug use Not Asked    Patient has had a drink/used drugs as an eye opener in the AM Not Asked   Social History Narrative    , lives with . She is a retired . 4 children (3 natural born, 1 adopted)- 2 ; 2x  (currently 17 years); son has prostate cancer, daughter COPD,  cardiac difficulty. She still drives. Does not have a Living will or Advanced Directive.     Medication List with Changes/Refills   Current Medications    ALLOPURINOL (ZYLOPRIM) 300 MG TABLET    TAKE ONE TABLET BY MOUTH EVERY DAY    ASCORBIC ACID, VITAMIN C, (VITAMIN C) 100 MG TABLET    Take by mouth once daily.    ASPIRIN (ECOTRIN) 81 MG EC TABLET    Take 81 mg by mouth nightly.     BUSPIRONE (BUSPAR) 7.5 MG TABLET    Take 1 tablet (7.5 mg total) by mouth 3 (three) times daily.    CALCITRIOL (ROCALTROL) 0.25 MCG CAP    TAKE ONE CAPSULE BY MOUTH EVERY MONDAY, WEDNESDAY AND FRIDAY    CLOPIDOGREL (PLAVIX) 75 MG TABLET    TAKE ONE TABLET BY MOUTH EVERY  DAY    COLCRYS 0.6 MG TABLET    TAKE ONE TABLET BY MOUTH EVERY DAY    DICLOFENAC SODIUM 1 % GEL    Apply 2 g topically once daily. Apply 2 g over painful joints once or twice a day.    ERGOCALCIFEROL (ERGOCALCIFEROL) 50,000 UNIT CAP    Take 1 capsule (50,000 Units total) by mouth every 7 days.    ERGOCALCIFEROL, VITAMIN D2, 400 UNIT TAB    Take by mouth.    FLUOCINOLONE (SYNALAR) 0.01 % EXTERNAL SOLUTION    AAA of scalp twice a day prn itching.    FLUTICASONE PROPIONATE (FLONASE) 50 MCG/ACTUATION NASAL SPRAY    2 sprays (100 mcg total) by Each Nare route once daily.    FUROSEMIDE (LASIX) 40 MG TABLET    Take 1 tab daily as needed for swelling, weight gain or shortness of breath    GABAPENTIN (NEURONTIN) 100 MG CAPSULE    Take 2 capsules (200 mg total) by mouth 3 (three) times daily.    IRON-VITAMIN C 100-250 MG, ICAR-C, 100-250 MG TAB    TAKE ONE TABLET BY MOUTH EVERY DAY    LEVOCETIRIZINE (XYZAL) 5 MG TABLET    Take 1 tablet (5 mg total) by mouth every evening.    LEVOTHYROXINE (SYNTHROID) 75 MCG TABLET    TAKE ONE TABLET BY MOUTH EVERY DAY BEFORE BREAKFAST    MELOXICAM (MOBIC) 7.5 MG TABLET    TAKE ONE TABLET BY MOUTH EVERY DAY AS NEEDED FOR PAIN    METOPROLOL TARTRATE (LOPRESSOR) 25 MG TABLET    TAKE ONE TABLET BY MOUTH TWICE DAILY    MIRTAZAPINE (REMERON SOL-TAB) 15 MG DISINTEGRATING TABLET    DISSOLVE ONE TABLET BY MOUTH NIGHTLY    MULTIVITAMIN (ONE DAILY MULTIVITAMIN) PER TABLET    Take 1 tablet by mouth once daily.    PRAVASTATIN (PRAVACHOL) 40 MG TABLET    TAKE ONE TABLET BY MOUTH ONCE DAILY    RANITIDINE (ZANTAC) 150 MG CAPSULE    Take 150 mg by mouth nightly.     SERTRALINE (ZOLOFT) 100 MG TABLET    TAKE 1.5 TABLETS BY MOUTH EVERY DAY    SODIUM BICARBONATE 650 MG TABLET    TAKE ONE TABLET BY MOUTH TWICE DAILY    TRAMADOL (ULTRAM) 50 MG TABLET    Take 1 tablet (50 mg total) by mouth every 12 (twelve) hours as needed for Pain.    TRETINOIN (RETIN-A) 0.025 % CREAM    Apply a pea-sized amount to the entire  face at bedtime.  Use every third night and increase as tolerated to nightly.    TRIAMCINOLONE ACETONIDE 0.025% (KENALOG) 0.025 % CREAM    Apply topically 2 (two) times daily. PRN itching.    ZINC ACETATE ORAL    Take 250 mg by mouth once daily.      Review of patient's allergies indicates:   Allergen Reactions    Corticosteroids (glucocorticoids) Nausea Only and Other (See Comments)     Stomach pain, dizziness, headache    Oxycodone Other (See Comments)     Blood pressure dropped       Objective:        General    Nursing note and vitals reviewed.  Constitutional: She is oriented to person, place, and time. She appears well-developed and well-nourished.   HENT:   Head: Normocephalic and atraumatic.   Eyes: EOM are normal.   Cardiovascular: Normal rate and regular rhythm.    Pulmonary/Chest: Effort normal.   Abdominal: Soft.   Neurological: She is alert and oriented to person, place, and time.   Psychiatric: She has a normal mood and affect. Her behavior is normal.         Right Shoulder Exam     Inspection/Observation   Swelling: absent  Bruising: absent  Scars: absent  Deformity: absent  Scapular Dyskinesia: negative    Range of Motion   Active abduction:  140 abnormal   Passive abduction: normal   Extension: abnormal   Forward Flexion:  120 abnormal   Forward Elevation: abnormal  Adduction: normal  External Rotation 0 degrees: normal   Internal rotation 0 degrees: normal     Tests & Signs   Cross arm: negative  Drop arm: negative  Miller test: negative  Impingement: negative  Rotator Cuff Painful Arc/Range: moderate  Active Compression Test ([place]'s Sign): negative  Speed's Test: negative    Other   Sensation: normal    Comments:  Hematoma Right arm proximally    Left Shoulder Exam     Inspection/Observation   Swelling: absent  Bruising: absent  Scars: absent  Deformity: absent  Scapular Dyskinesia: negative    Range of Motion   Active abduction: normal   Passive abduction: normal   Extension: normal    Forward Flexion: normal   Forward Elevation: normal  Adduction: normal  External Rotation 0 degrees: normal   Internal rotation 0 degrees: normal     Other   Sensation: normal       Muscle Strength   Right Upper Extremity   Shoulder Abduction: 5/5   Shoulder Internal Rotation: 5/5   Shoulder External Rotation: 4/5   Left Upper Extremity  Shoulder Abduction: 5/5   Shoulder Internal Rotation: 5/5   Shoulder External Rotation: 5/5     Vascular Exam     Right Pulses      Radial:                    2+      Left Pulses      Radial:                    2+      Capillary Refill  Right Hand: normal capillary refill  Left Hand: normal capillary refill      Assessment:       Encounter Diagnoses   Name Primary?    Acute pain of left shoulder Yes    Traumatic hematoma of right upper arm, subsequent encounter           Plan:       Violet was seen today for pain.    Diagnoses and all orders for this visit:    Acute pain of left shoulder    Traumatic hematoma of right upper arm, subsequent encounter        Violet Swenson is an established pt here for routine follow-up of the above. She actually has a hematoma in the left proximal arm.  I think this is the source of her swelling as well as the source of her discomfort particularly with lifting.  She will start using a heat pad a couple of times a day to help with the reabsorption.  This will get better it just will take time.  If it gets worse, I will be happy to see her back.  Otherwise, she will follow up with me p.r.n..  Verbalizes understanding and agrees with the above plan.    Follow up if symptoms worsen or fail to improve.    The patient understands, chooses and consents to this plan and accepts all   the risks which include but are not limited to the risks mentioned above.     Disclaimer: This note was prepared using a voice recognition system and is likely to have sound alike errors within the text.

## 2020-03-03 ENCOUNTER — CLINICAL SUPPORT (OUTPATIENT)
Dept: AUDIOLOGY | Facility: CLINIC | Age: 80
End: 2020-03-03
Payer: MEDICARE

## 2020-03-03 ENCOUNTER — OFFICE VISIT (OUTPATIENT)
Dept: OTOLARYNGOLOGY | Facility: CLINIC | Age: 80
End: 2020-03-03
Payer: MEDICARE

## 2020-03-03 VITALS
DIASTOLIC BLOOD PRESSURE: 85 MMHG | BODY MASS INDEX: 24.99 KG/M2 | TEMPERATURE: 97 F | SYSTOLIC BLOOD PRESSURE: 125 MMHG | WEIGHT: 146.38 LBS | HEART RATE: 89 BPM | HEIGHT: 64 IN

## 2020-03-03 DIAGNOSIS — H81.12 BPPV (BENIGN PAROXYSMAL POSITIONAL VERTIGO), LEFT: ICD-10-CM

## 2020-03-03 DIAGNOSIS — H93.A1 PULSATILE TINNITUS, RIGHT EAR: ICD-10-CM

## 2020-03-03 DIAGNOSIS — H90.A31 MIXED CONDUCTIVE AND SENSORINEURAL HEARING LOSS OF RIGHT EAR WITH RESTRICTED HEARING OF LEFT EAR: ICD-10-CM

## 2020-03-03 DIAGNOSIS — R42 VERTIGO: ICD-10-CM

## 2020-03-03 DIAGNOSIS — H90.A31 MIXED CONDUCTIVE AND SENSORINEURAL HEARING LOSS OF RIGHT EAR WITH RESTRICTED HEARING OF LEFT EAR: Primary | ICD-10-CM

## 2020-03-03 DIAGNOSIS — H93.A1 PULSATILE TINNITUS OF RIGHT EAR: Primary | ICD-10-CM

## 2020-03-03 PROCEDURE — 99213 OFFICE O/P EST LOW 20 MIN: CPT | Mod: HCNC,S$GLB,, | Performed by: PHYSICIAN ASSISTANT

## 2020-03-03 PROCEDURE — 1159F PR MEDICATION LIST DOCUMENTED IN MEDICAL RECORD: ICD-10-PCS | Mod: HCNC,S$GLB,, | Performed by: PHYSICIAN ASSISTANT

## 2020-03-03 PROCEDURE — 99999 PR PBB SHADOW E&M-EST. PATIENT-LVL I: ICD-10-PCS | Mod: PBBFAC,HCNC,, | Performed by: AUDIOLOGIST-HEARING AID FITTER

## 2020-03-03 PROCEDURE — 92542 PR POSITIONAL NYSTAGMUS TEST: ICD-10-PCS | Mod: HCNC,S$GLB,, | Performed by: AUDIOLOGIST-HEARING AID FITTER

## 2020-03-03 PROCEDURE — 99213 PR OFFICE/OUTPT VISIT, EST, LEVL III, 20-29 MIN: ICD-10-PCS | Mod: HCNC,S$GLB,, | Performed by: PHYSICIAN ASSISTANT

## 2020-03-03 PROCEDURE — 1101F PT FALLS ASSESS-DOCD LE1/YR: CPT | Mod: HCNC,CPTII,S$GLB, | Performed by: PHYSICIAN ASSISTANT

## 2020-03-03 PROCEDURE — 99999 PR PBB SHADOW E&M-EST. PATIENT-LVL V: ICD-10-PCS | Mod: PBBFAC,HCNC,, | Performed by: PHYSICIAN ASSISTANT

## 2020-03-03 PROCEDURE — 1126F PR PAIN SEVERITY QUANTIFIED, NO PAIN PRESENT: ICD-10-PCS | Mod: HCNC,S$GLB,, | Performed by: PHYSICIAN ASSISTANT

## 2020-03-03 PROCEDURE — 1126F AMNT PAIN NOTED NONE PRSNT: CPT | Mod: HCNC,S$GLB,, | Performed by: PHYSICIAN ASSISTANT

## 2020-03-03 PROCEDURE — 3079F PR MOST RECENT DIASTOLIC BLOOD PRESSURE 80-89 MM HG: ICD-10-PCS | Mod: HCNC,CPTII,S$GLB, | Performed by: PHYSICIAN ASSISTANT

## 2020-03-03 PROCEDURE — 99999 PR PBB SHADOW E&M-EST. PATIENT-LVL V: CPT | Mod: PBBFAC,HCNC,, | Performed by: PHYSICIAN ASSISTANT

## 2020-03-03 PROCEDURE — 92542 POSITIONAL NYSTAGMUS TEST: CPT | Mod: HCNC,S$GLB,, | Performed by: AUDIOLOGIST-HEARING AID FITTER

## 2020-03-03 PROCEDURE — 3074F SYST BP LT 130 MM HG: CPT | Mod: HCNC,CPTII,S$GLB, | Performed by: PHYSICIAN ASSISTANT

## 2020-03-03 PROCEDURE — 1159F MED LIST DOCD IN RCRD: CPT | Mod: HCNC,S$GLB,, | Performed by: PHYSICIAN ASSISTANT

## 2020-03-03 PROCEDURE — 3074F PR MOST RECENT SYSTOLIC BLOOD PRESSURE < 130 MM HG: ICD-10-PCS | Mod: HCNC,CPTII,S$GLB, | Performed by: PHYSICIAN ASSISTANT

## 2020-03-03 PROCEDURE — 1101F PR PT FALLS ASSESS DOC 0-1 FALLS W/OUT INJ PAST YR: ICD-10-PCS | Mod: HCNC,CPTII,S$GLB, | Performed by: PHYSICIAN ASSISTANT

## 2020-03-03 PROCEDURE — 3079F DIAST BP 80-89 MM HG: CPT | Mod: HCNC,CPTII,S$GLB, | Performed by: PHYSICIAN ASSISTANT

## 2020-03-03 PROCEDURE — 99999 PR PBB SHADOW E&M-EST. PATIENT-LVL I: CPT | Mod: PBBFAC,HCNC,, | Performed by: AUDIOLOGIST-HEARING AID FITTER

## 2020-03-03 NOTE — PROGRESS NOTES
Referring provider: Dr. Patricia Mcnally Messi was seen 03/03/2020 for re-check BPPV.   She obtained Epley last week for left BPPV.   Patient reports dizziness has resolved.  She demonstrates much improved gait today.     Vestibular Screen:  Spontaneous Nystagmus Test: Absent for nystagmus  Static Positionals: Absent for nystagmus  [place]-Hallpike Head-hanging Right: Negative for BPPV - absent for nystagmus or dizziness.  [place]-Hallpike Head-hanging Left: Negative for BPPV - absent for nystagmus or dizziness.    Summary: BPPV has resolved. Patient may resume regular activity.    Recommend:  1. 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  Patient reports pulsing has much improved following Epley and resumed use of flonase.  She notes pulsing can be provoked when extending her neck by turning head down and to the left.

## 2020-03-09 ENCOUNTER — OFFICE VISIT (OUTPATIENT)
Dept: RHEUMATOLOGY | Facility: CLINIC | Age: 80
End: 2020-03-09
Payer: MEDICARE

## 2020-03-09 VITALS
SYSTOLIC BLOOD PRESSURE: 118 MMHG | WEIGHT: 146.19 LBS | BODY MASS INDEX: 24.96 KG/M2 | DIASTOLIC BLOOD PRESSURE: 74 MMHG | HEART RATE: 92 BPM | HEIGHT: 64 IN

## 2020-03-09 DIAGNOSIS — M81.0 AGE-RELATED OSTEOPOROSIS WITHOUT CURRENT PATHOLOGICAL FRACTURE: Primary | ICD-10-CM

## 2020-03-09 DIAGNOSIS — G62.9 NEUROPATHY: ICD-10-CM

## 2020-03-09 DIAGNOSIS — F41.9 ANXIETY: ICD-10-CM

## 2020-03-09 DIAGNOSIS — C85.83 LARGE CELL LYMPHOMA OF INTRA-ABDOMINAL LYMPH NODES: ICD-10-CM

## 2020-03-09 DIAGNOSIS — M1A.09X0 IDIOPATHIC CHRONIC GOUT OF MULTIPLE SITES WITHOUT TOPHUS: ICD-10-CM

## 2020-03-09 DIAGNOSIS — M15.9 PRIMARY OSTEOARTHRITIS INVOLVING MULTIPLE JOINTS: ICD-10-CM

## 2020-03-09 PROCEDURE — 99999 PR PBB SHADOW E&M-EST. PATIENT-LVL III: CPT | Mod: PBBFAC,HCNC,, | Performed by: INTERNAL MEDICINE

## 2020-03-09 PROCEDURE — 3078F DIAST BP <80 MM HG: CPT | Mod: HCNC,CPTII,S$GLB, | Performed by: INTERNAL MEDICINE

## 2020-03-09 PROCEDURE — 1159F PR MEDICATION LIST DOCUMENTED IN MEDICAL RECORD: ICD-10-PCS | Mod: HCNC,S$GLB,, | Performed by: INTERNAL MEDICINE

## 2020-03-09 PROCEDURE — 1101F PR PT FALLS ASSESS DOC 0-1 FALLS W/OUT INJ PAST YR: ICD-10-PCS | Mod: HCNC,CPTII,S$GLB, | Performed by: INTERNAL MEDICINE

## 2020-03-09 PROCEDURE — 99215 PR OFFICE/OUTPT VISIT, EST, LEVL V, 40-54 MIN: ICD-10-PCS | Mod: HCNC,S$GLB,, | Performed by: INTERNAL MEDICINE

## 2020-03-09 PROCEDURE — 1159F MED LIST DOCD IN RCRD: CPT | Mod: HCNC,S$GLB,, | Performed by: INTERNAL MEDICINE

## 2020-03-09 PROCEDURE — 1125F PR PAIN SEVERITY QUANTIFIED, PAIN PRESENT: ICD-10-PCS | Mod: HCNC,S$GLB,, | Performed by: INTERNAL MEDICINE

## 2020-03-09 PROCEDURE — 1101F PT FALLS ASSESS-DOCD LE1/YR: CPT | Mod: HCNC,CPTII,S$GLB, | Performed by: INTERNAL MEDICINE

## 2020-03-09 PROCEDURE — 3074F SYST BP LT 130 MM HG: CPT | Mod: HCNC,CPTII,S$GLB, | Performed by: INTERNAL MEDICINE

## 2020-03-09 PROCEDURE — 99215 OFFICE O/P EST HI 40 MIN: CPT | Mod: HCNC,S$GLB,, | Performed by: INTERNAL MEDICINE

## 2020-03-09 PROCEDURE — 3074F PR MOST RECENT SYSTOLIC BLOOD PRESSURE < 130 MM HG: ICD-10-PCS | Mod: HCNC,CPTII,S$GLB, | Performed by: INTERNAL MEDICINE

## 2020-03-09 PROCEDURE — 99999 PR PBB SHADOW E&M-EST. PATIENT-LVL III: ICD-10-PCS | Mod: PBBFAC,HCNC,, | Performed by: INTERNAL MEDICINE

## 2020-03-09 PROCEDURE — 3078F PR MOST RECENT DIASTOLIC BLOOD PRESSURE < 80 MM HG: ICD-10-PCS | Mod: HCNC,CPTII,S$GLB, | Performed by: INTERNAL MEDICINE

## 2020-03-09 PROCEDURE — 1125F AMNT PAIN NOTED PAIN PRSNT: CPT | Mod: HCNC,S$GLB,, | Performed by: INTERNAL MEDICINE

## 2020-03-09 RX ORDER — GABAPENTIN 300 MG/1
300 CAPSULE ORAL 3 TIMES DAILY
Qty: 90 CAPSULE | Refills: 3 | Status: SHIPPED | OUTPATIENT
Start: 2020-03-09 | End: 2020-05-29

## 2020-03-09 RX ORDER — BUSPIRONE HYDROCHLORIDE 7.5 MG/1
7.5 TABLET ORAL 3 TIMES DAILY
Qty: 90 TABLET | Refills: 0 | Status: SHIPPED | OUTPATIENT
Start: 2020-03-09 | End: 2020-04-06

## 2020-03-09 RX ORDER — GABAPENTIN 300 MG/1
300 CAPSULE ORAL 2 TIMES DAILY
Qty: 60 CAPSULE | Refills: 3 | Status: SHIPPED | OUTPATIENT
Start: 2020-03-09 | End: 2020-03-09

## 2020-03-09 NOTE — PROGRESS NOTES
RHEUMATOLOGY CLINIC FOLLOW UP VISIT  Chief complaints:-  To follow up for arthritis. My numbness from neuropathy is worsening in my hands.     HPI:-  Violet Zhao a 80 y.o. pleasant female comes in for a follow up visit. She follows up in the Rheumatology Clinic for osteoporosis, gout and osteoarthritis.  She complains of worsening pain in her fingetips secondary to her chemo induced neuropathy with partial improvement from gabapentin.  She denies any flare-up of gout since last visit.  She is taking allopurinol without any problem.  She has chronic kidney disease associated with hyperparathyroidism and vitamin-D deficiency.  She is under care of nephrologist Dr. Gomez. She is in prolia for osteoporosis . No falls or fractures since last visit.      Review of Systems   Constitutional: Negative for chills and fever.   HENT: Negative for congestion and sore throat.    Eyes: Negative for blurred vision and redness.   Respiratory: Negative for cough and shortness of breath.    Cardiovascular: Negative for chest pain and leg swelling.   Gastrointestinal: Negative for abdominal pain.   Genitourinary: Negative for dysuria.   Musculoskeletal: Positive for back pain, joint pain and neck pain. Negative for falls and myalgias.   Skin: Negative for rash.   Neurological: Positive for sensory change. Negative for headaches.   Endo/Heme/Allergies: Does not bruise/bleed easily.   Psychiatric/Behavioral: Negative for memory loss. The patient does not have insomnia.        Past Medical History:   Diagnosis Date    Age-related osteoporosis without current pathological fracture 8/20/2018    Anemia     Anxiety     Arthritis     Atrial flutter     Cancer     lymphoma Large cell B    CHF (congestive heart failure)     Chronic anemia 4/26/2017    Chronic midline low back pain with right-sided sciatica 8/20/2018    Coronary artery disease     01/2015 [facility] patent LCX.  50% stenosis in LAD and RCA.      Depression     Disorder of kidney and ureter     Encounter for blood transfusion     GERD (gastroesophageal reflux disease)     Gout, arthritis     Heart failure     Hx of psychiatric care     Hyperlipidemia     Hypertension     Hypothyroidism     Immune deficiency disorder     Kidney disease     Lung nodule     RML--stable    Obesity     Pacemaker     Metronic    Paroxysmal atrial fibrillation 3/15/2018    Pneumonia     Polyneuropathy     chemo induced    Psychiatric problem     Tobacco dependence     quit     Trouble in sleeping        Past Surgical History:   Procedure Laterality Date    APPENDECTOMY  1966 approx    CARDIAC PACEMAKER PLACEMENT  2015    CHOLECYSTECTOMY  1993    incidental at time of gastric bypass    COLON SURGERY Right 2017    hemicolectomy    COLONOSCOPY N/A 2017    Procedure: COLONOSCOPY;  Surgeon: Tye Enamorado MD;  Location: [facility];  Service: Endoscopy;  Laterality: N/A;    COLONOSCOPY N/A 2018    Procedure: COLONOSCOPY;  Surgeon: Saúl Arthur III, MD;  Location: [facility];  Service: Endoscopy;  Laterality: N/A;    CORONARY ANGIOPLASTY  2014    CORONARY STENT PLACEMENT  2014    ESOPHAGOGASTRODUODENOSCOPY N/A 2018    Procedure: EGD (ESOPHAGOGASTRODUODENOSCOPY);  Surgeon: Saúl Arthur III, MD;  Location: [facility];  Service: Endoscopy;  Laterality: N/A;    GASTRIC BYPASS      with incidental choly    HERNIA REPAIR      JOINT REPLACEMENT Bilateral 2009    3 months apart    TONSILLECTOMY          Social History     Tobacco Use    Smoking status: Former Smoker     Packs/day: 2.00     Years: 6.00     Pack years: 12.00     Last attempt to quit: 3/15/1976     Years since quittin.0    Smokeless tobacco: Never Used   Substance Use Topics    Alcohol use: No     Alcohol/week: 0.0 standard drinks    Drug use: No       Family History   Problem Relation Age of Onset     "Heart disease Mother     Hypertension Mother     Cataracts Mother     Stomach cancer Father         "ulcers that turned to cancer"    Cancer Father         stomach    Pancreatic cancer Sister     Cancer Sister         pancreatic    Leukemia Brother         "leukemia which led to intestinal cancer"    Cataracts Brother     Cancer Brother         leukemia then later stomach cancer    Drug abuse Son     Cancer Son         prostate cancer    Prostate cancer Son     COPD Daughter     Asthma Daughter     Hypertension Maternal Grandfather     Stroke Maternal Grandfather     Alcohol abuse Neg Hx     Diabetes Neg Hx     Mental retardation Neg Hx     Mental illness Neg Hx        Review of patient's allergies indicates:   Allergen Reactions    Corticosteroids (glucocorticoids) Nausea Only and Other (See Comments)     Stomach pain, dizziness, headache    Oxycodone Other (See Comments)     Blood pressure dropped       Vitals:    03/09/20 1347   BP: 118/74   Pulse: 92   Weight: 66.3 kg (146 lb 2.6 oz)   Height: 5' 4" (1.626 m)   PainSc:   6   PainLoc: Shoulder       Physical Exam   Constitutional: She is oriented to person, place, and time and well-developed, well-nourished, and in no distress. No distress.   HENT:   Head: Normocephalic.   Mouth/Throat: Oropharynx is clear and moist.   Eyes: Pupils are equal, round, and reactive to light. Conjunctivae and EOM are normal.   Neck: Normal range of motion. Neck supple.   Cardiovascular: Normal rate and intact distal pulses.   Pulmonary/Chest: Effort normal. No respiratory distress.   Abdominal: Soft. There is no tenderness.   Musculoskeletal:   No synovitis over small joints of hands or feet.  No effusion over large joints.Mild tenderness over lumbar region.    Neurological: She is alert and oriented to person, place, and time. No cranial nerve deficit.   Skin: Skin is warm. No rash noted. No erythema.   Psychiatric: Mood and affect normal.   Nursing note and " vitals reviewed.      Medication List with Changes/Refills   Current Medications    ALLOPURINOL (ZYLOPRIM) 300 MG TABLET    TAKE ONE TABLET BY MOUTH EVERY DAY    ASCORBIC ACID, VITAMIN C, (VITAMIN C) 100 MG TABLET    Take by mouth once daily.    ASPIRIN (ECOTRIN) 81 MG EC TABLET    Take 81 mg by mouth nightly.     BUSPIRONE (BUSPAR) 7.5 MG TABLET    Take 1 tablet (7.5 mg total) by mouth 3 (three) times daily.    CALCITRIOL (ROCALTROL) 0.25 MCG CAP    TAKE ONE CAPSULE BY MOUTH EVERY MONDAY, WEDNESDAY AND FRIDAY    CLOPIDOGREL (PLAVIX) 75 MG TABLET    TAKE ONE TABLET BY MOUTH EVERY DAY    COLCRYS 0.6 MG TABLET    TAKE ONE TABLET BY MOUTH EVERY DAY    DICLOFENAC SODIUM 1 % GEL    Apply 2 g topically once daily. Apply 2 g over painful joints once or twice a day.    ERGOCALCIFEROL (ERGOCALCIFEROL) 50,000 UNIT CAP    Take 1 capsule (50,000 Units total) by mouth every 7 days.    ERGOCALCIFEROL, VITAMIN D2, 400 UNIT TAB    Take by mouth.    FLUOCINOLONE (SYNALAR) 0.01 % EXTERNAL SOLUTION    AAA of scalp twice a day prn itching.    FLUTICASONE PROPIONATE (FLONASE) 50 MCG/ACTUATION NASAL SPRAY    2 sprays (100 mcg total) by Each Nare route once daily.    FUROSEMIDE (LASIX) 40 MG TABLET    Take 1 tab daily as needed for swelling, weight gain or shortness of breath    IRON-VITAMIN C 100-250 MG, ICAR-C, 100-250 MG TAB    TAKE ONE TABLET BY MOUTH EVERY DAY    LEVOCETIRIZINE (XYZAL) 5 MG TABLET    Take 1 tablet (5 mg total) by mouth every evening.    LEVOTHYROXINE (SYNTHROID) 75 MCG TABLET    TAKE ONE TABLET BY MOUTH EVERY DAY BEFORE BREAKFAST    MELOXICAM (MOBIC) 7.5 MG TABLET    TAKE ONE TABLET BY MOUTH EVERY DAY AS NEEDED FOR PAIN    METOPROLOL TARTRATE (LOPRESSOR) 25 MG TABLET    TAKE ONE TABLET BY MOUTH TWICE DAILY    MIRTAZAPINE (REMERON SOL-TAB) 15 MG DISINTEGRATING TABLET    DISSOLVE ONE TABLET BY MOUTH NIGHTLY    MULTIVITAMIN (ONE DAILY MULTIVITAMIN) PER TABLET    Take 1 tablet by mouth once daily.    PRAVASTATIN  (PRAVACHOL) 40 MG TABLET    TAKE ONE TABLET BY MOUTH ONCE DAILY    RANITIDINE (ZANTAC) 150 MG CAPSULE    Take 150 mg by mouth nightly.     SERTRALINE (ZOLOFT) 100 MG TABLET    TAKE 1.5 TABLETS BY MOUTH EVERY DAY    SODIUM BICARBONATE 650 MG TABLET    TAKE ONE TABLET BY MOUTH TWICE DAILY    TRAMADOL (ULTRAM) 50 MG TABLET    Take 1 tablet (50 mg total) by mouth every 12 (twelve) hours as needed for Pain.    TRETINOIN (RETIN-A) 0.025 % CREAM    Apply a pea-sized amount to the entire face at bedtime.  Use every third night and increase as tolerated to nightly.    TRIAMCINOLONE ACETONIDE 0.025% (KENALOG) 0.025 % CREAM    Apply topically 2 (two) times daily. PRN itching.    ZINC ACETATE ORAL    Take 250 mg by mouth once daily.    Changed and/or Refilled Medications    Modified Medication Previous Medication    GABAPENTIN (NEURONTIN) 300 MG CAPSULE gabapentin (NEURONTIN) 100 MG capsule       Take 1 capsule (300 mg total) by mouth 3 (three) times daily.    Take 2 capsules (200 mg total) by mouth 3 (three) times daily.       Assessment/Plans:-  1. Age-related osteoporosis without current pathological fracture    2. Primary osteoarthritis involving multiple joints    3. Idiopathic chronic gout of multiple sites without tophus    4. 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 Neuropathy      Problem List Items Addressed This Visit        Neuro    Neuropathy    Current Assessment & Plan     Worsening neuropathy pain . Increase gabapentin to 300 mg tid.          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  No flare-up since last visit.  Continue allopurinol 300 mg once daily.  Check uric acid levels today.  Be cautious with  colchicine because of her chronic kidney disease.         Primary osteoarthritis involving multiple joints (Chronic)    Current Assessment & Plan     PRN gabapentin and tylenol with turmeric.          Age-related osteoporosis without current pathological fracture - Primary    Current Assessment & Plan     Osteoporosis without any history of fragility fracture.  Bisphosphonate contraindicated because of chronic kidney disease.  GFR more than 30 with no contraindication for Prolia.  Start Prolia.  Discussed in detail about all adverse effects of the medication including osteonecrosis of the jaw and atypical femoral fractures.  She is at high risk for hypocalcemia within the 1st week after Prolia.  Check calcium before and after Prolia injection.             # Follow up in about 6 months (around 9/9/2020).      Disclaimer: This note was prepared using voice recognition system and is likely to have sound alike errors and is not proof read.  Please call me with any questions.

## 2020-03-10 ENCOUNTER — INFUSION (OUTPATIENT)
Dept: INFUSION THERAPY | Facility: HOSPITAL | Age: 80
End: 2020-03-10
Attending: INTERNAL MEDICINE
Payer: MEDICARE

## 2020-03-10 ENCOUNTER — HOSPITAL ENCOUNTER (OUTPATIENT)
Dept: RADIOLOGY | Facility: HOSPITAL | Age: 80
Discharge: HOME | End: 2020-03-10
Attending: OTOLARYNGOLOGY
Payer: MEDICARE

## 2020-03-10 ENCOUNTER — TELEPHONE (OUTPATIENT)
Dept: INFUSION THERAPY | Facility: HOSPITAL | Age: 80
End: 2020-03-10

## 2020-03-10 VITALS
TEMPERATURE: 97 F | DIASTOLIC BLOOD PRESSURE: 83 MMHG | HEART RATE: 78 BPM | RESPIRATION RATE: 18 BRPM | SYSTOLIC BLOOD PRESSURE: 129 MMHG | OXYGEN SATURATION: 97 %

## 2020-03-10 VITALS
RESPIRATION RATE: 18 BRPM | SYSTOLIC BLOOD PRESSURE: 115 MMHG | TEMPERATURE: 98 F | DIASTOLIC BLOOD PRESSURE: 60 MMHG | HEART RATE: 65 BPM

## 2020-03-10 DIAGNOSIS — N18.9 CKD (CHRONIC KIDNEY DISEASE): Primary | ICD-10-CM

## 2020-03-10 DIAGNOSIS — H93.A1 PULSATILE TINNITUS, RIGHT EAR: ICD-10-CM

## 2020-03-10 DIAGNOSIS — M81.0 AGE-RELATED OSTEOPOROSIS WITHOUT CURRENT PATHOLOGICAL FRACTURE: Primary | ICD-10-CM

## 2020-03-10 DIAGNOSIS — H90.A31 MIXED CONDUCTIVE AND SENSORINEURAL HEARING LOSS OF RIGHT EAR WITH RESTRICTED HEARING OF LEFT EAR: ICD-10-CM

## 2020-03-10 PROCEDURE — 70480 CT ORBIT/EAR/FOSSA W/O DYE: CPT | Mod: TC,HCNC

## 2020-03-10 PROCEDURE — 96372 THER/PROPH/DIAG INJ SC/IM: CPT | Mod: HCNC

## 2020-03-10 PROCEDURE — 63600175 PHARM REV CODE 636 W HCPCS: Mod: JG,HCNC | Performed by: INTERNAL MEDICINE

## 2020-03-10 PROCEDURE — 96365 THER/PROPH/DIAG IV INF INIT: CPT | Mod: HCNC

## 2020-03-10 PROCEDURE — 63600175 PHARM REV CODE 636 W HCPCS: Mod: HCNC | Performed by: INTERNAL MEDICINE

## 2020-03-10 RX ORDER — SODIUM CHLORIDE 9 MG/ML
INJECTION, SOLUTION INTRAVENOUS CONTINUOUS
Status: CANCELLED | OUTPATIENT
Start: 2020-03-10

## 2020-03-10 RX ADMIN — DENOSUMAB 60 MG: 60 INJECTION SUBCUTANEOUS at 01:03

## 2020-03-10 RX ADMIN — SODIUM CHLORIDE 250 ML: 0.9 INJECTION, SOLUTION INTRAVENOUS at 11:03

## 2020-03-10 NOTE — DISCHARGE INSTRUCTIONS
[facility]  [address] [facility]  [address]  [phone] phone     Hours of Operation: Monday- Friday 8:00am- 5:00pm  After hours phone  [phone]    Dr. Ken Tyler, ELIZABETH Blevins      Please call with any concerns regarding your appointment today.   FALL PREVENTION   Falls often occur due to slipping, tripping or losing your balance. Here are ways to reduce your risk of falling again.   Was there anything that caused your fall that can be fixed, removed or replaced?   Make your home safe by keeping walkways clear of objects you may trip over.   Use non-slip pads under rugs.   Do not walk in poorly lit areas.   Do not stand on chairs or wobbly ladders.   Use caution when reaching overhead or looking upward. This position can cause a loss of balance.   Be sure your shoes fit properly, have non-slip bottoms and are in good condition.   Be cautious when going up and down stairs, curbs, and when walking on uneven sidewalks.   If your balance is poor, consider using a cane or walker.   If your fall was related to alcohol use, stop or limit alcohol intake.   If your fall was related to use of sleeping medicines, talk to your doctor about this. You may need to reduce your dosage at bedtime if you awaken during the night to go to the bathroom.   To reduce the need for nighttime bathroom trips:   Avoid drinking fluids for several hours before going to bed   Empty your bladder before going to bed   Men can keep a urinal at the bedside   © 2890-1010 [employer] [facility], [address] [place] [id number]. All rights reserved. This information is not intended as a substitute for professional medical care. Always follow your healthcare professional's instructions.

## 2020-03-10 NOTE — TELEPHONE ENCOUNTER
Call placed to explain to Ms. Swenson that her Prolia is not approved. No answer. Unable to leave message.

## 2020-03-10 NOTE — NURSING
Patient tolerated prolia injection well. Reviewed S&S of reaction, dental work, and supplements. Instructed to wait in clinic for 15 min. Patient verbalized understanding.

## 2020-03-10 NOTE — NURSING
Prolia 60 mg q 6 months  Last dose given-3/6/2019    Any invasive dental procedures in past 3 months or upcoming 3 months: Denied    Last Rheumatology provider visit- Seen by Dr. PANIAGUA on 3/9/2020    Recent labs? 2/26/2020;  CKD pt needing repeat labs in 10 days- 3/19/2020   Lab Results   Component Value Date    CALCIUM 8.6 (L) 02/26/2020     Lab Results   Component Value Date    CREATININE 1.6 (H) 02/26/2020     Lab Results   Component Value Date    ESTGFRAFRICA 35.1 (A) 02/26/2020     Lab Results   Component Value Date    EGFRNONAA 30.4 (A) 02/26/2020     Lab Results   Component Value Date    [id number] 23 (L) 12/02/2019           Prolia 60 mg/ml administered SQ to lower abdominal quadrant. Tolerated without any complaints. No adverse reaction noted or reported after 15 minutes of administration. No redness, swelling, or drainage noted to site. Pt instructed on signs and symptoms of reaction to report. Verbalizes understanding.     Follow up appointments:  Given to patient    Note:  Dr. PANIAGUA reviewed labs - ok to give prolia per Dr. PANIAGUA secure chat.

## 2020-03-10 NOTE — PLAN OF CARE
Patient tolerated 250ml normal saline over 1 hour well; no adverse reactions noted. Iv discontinued with unit intact. Ambulate with rolling walker with attached seat downstairs to radiology department for ct scan.  with patient.

## 2020-03-11 ENCOUNTER — TELEPHONE (OUTPATIENT)
Dept: OTOLARYNGOLOGY | Facility: CLINIC | Age: 80
End: 2020-03-11

## 2020-03-11 NOTE — TELEPHONE ENCOUNTER
Attempt 1 no answer     ----- Message from Gali Soto PA-C sent at 3/11/2020  7:58 AM CDT -----  Please let pt know her CT is negative for any abnormality. We would recommend considering HA's when she feels ready for this, thank you!

## 2020-03-12 ENCOUNTER — HOSPITAL ENCOUNTER (OUTPATIENT)
Dept: CARDIOLOGY | Facility: HOSPITAL | Age: 80
Discharge: HOME | End: 2020-03-12
Attending: INTERNAL MEDICINE
Payer: MEDICARE

## 2020-03-12 ENCOUNTER — TELEPHONE (OUTPATIENT)
Dept: FAMILY MEDICINE | Facility: CLINIC | Age: 80
End: 2020-03-12

## 2020-03-12 ENCOUNTER — OFFICE VISIT (OUTPATIENT)
Dept: CARDIOLOGY | Facility: CLINIC | Age: 80
End: 2020-03-12
Payer: MEDICARE

## 2020-03-12 VITALS
HEIGHT: 64 IN | DIASTOLIC BLOOD PRESSURE: 56 MMHG | HEART RATE: 83 BPM | BODY MASS INDEX: 24.57 KG/M2 | SYSTOLIC BLOOD PRESSURE: 90 MMHG | WEIGHT: 143.94 LBS | OXYGEN SATURATION: 97 %

## 2020-03-12 DIAGNOSIS — I10 ESSENTIAL HYPERTENSION: Chronic | ICD-10-CM

## 2020-03-12 DIAGNOSIS — C85.83 LARGE CELL LYMPHOMA OF INTRA-ABDOMINAL LYMPH NODES: Chronic | ICD-10-CM

## 2020-03-12 DIAGNOSIS — I25.10 CORONARY ARTERY DISEASE WITHOUT ANGINA PECTORIS, UNSPECIFIED VESSEL OR LESION TYPE, UNSPECIFIED WHETHER NATIVE OR TRANSPLANTED HEART: ICD-10-CM

## 2020-03-12 DIAGNOSIS — R94.2 DIFFUSION CAPACITY OF LUNG (DL), DECREASED: ICD-10-CM

## 2020-03-12 DIAGNOSIS — D64.9 CHRONIC ANEMIA: Chronic | ICD-10-CM

## 2020-03-12 DIAGNOSIS — G62.0 CHEMOTHERAPY-INDUCED NEUROPATHY: Chronic | ICD-10-CM

## 2020-03-12 DIAGNOSIS — N18.30 STAGE 3 CHRONIC KIDNEY DISEASE: Chronic | ICD-10-CM

## 2020-03-12 DIAGNOSIS — I25.5 ISCHEMIC CARDIOMYOPATHY: Chronic | ICD-10-CM

## 2020-03-12 DIAGNOSIS — M1A.09X0 IDIOPATHIC CHRONIC GOUT OF MULTIPLE SITES WITHOUT TOPHUS: Chronic | ICD-10-CM

## 2020-03-12 DIAGNOSIS — E03.9 HYPOTHYROIDISM (ACQUIRED): Chronic | ICD-10-CM

## 2020-03-12 DIAGNOSIS — Z95.0 PACEMAKER: Chronic | ICD-10-CM

## 2020-03-12 DIAGNOSIS — E78.2 MIXED HYPERLIPIDEMIA: Chronic | ICD-10-CM

## 2020-03-12 DIAGNOSIS — Z98.84 S/P GASTRIC BYPASS: Chronic | ICD-10-CM

## 2020-03-12 DIAGNOSIS — I25.10 CORONARY ARTERY DISEASE INVOLVING NATIVE CORONARY ARTERY OF NATIVE HEART WITHOUT ANGINA PECTORIS: Primary | Chronic | ICD-10-CM

## 2020-03-12 DIAGNOSIS — I48.0 PAROXYSMAL ATRIAL FIBRILLATION: Chronic | ICD-10-CM

## 2020-03-12 DIAGNOSIS — Z86.79 HISTORY OF CONGESTIVE HEART FAILURE: ICD-10-CM

## 2020-03-12 DIAGNOSIS — I25.10 CORONARY ARTERY DISEASE WITHOUT ANGINA PECTORIS, UNSPECIFIED VESSEL OR LESION TYPE, UNSPECIFIED WHETHER NATIVE OR TRANSPLANTED HEART: Primary | ICD-10-CM

## 2020-03-12 DIAGNOSIS — T45.1X5A CHEMOTHERAPY-INDUCED NEUROPATHY: Chronic | ICD-10-CM

## 2020-03-12 DIAGNOSIS — I70.0 CALCIFICATION OF AORTA: Chronic | ICD-10-CM

## 2020-03-12 PROCEDURE — 93010 ELECTROCARDIOGRAM REPORT: CPT | Mod: HCNC,,, | Performed by: INTERNAL MEDICINE

## 2020-03-12 PROCEDURE — 93010 EKG 12-LEAD: ICD-10-PCS | Mod: HCNC,,, | Performed by: INTERNAL MEDICINE

## 2020-03-12 PROCEDURE — 99999 PR PBB SHADOW E&M-EST. PATIENT-LVL III: ICD-10-PCS | Mod: PBBFAC,HCNC,, | Performed by: INTERNAL MEDICINE

## 2020-03-12 PROCEDURE — 3078F DIAST BP <80 MM HG: CPT | Mod: HCNC,CPTII,S$GLB, | Performed by: INTERNAL MEDICINE

## 2020-03-12 PROCEDURE — 1101F PT FALLS ASSESS-DOCD LE1/YR: CPT | Mod: HCNC,CPTII,S$GLB, | Performed by: INTERNAL MEDICINE

## 2020-03-12 PROCEDURE — 1159F MED LIST DOCD IN RCRD: CPT | Mod: HCNC,S$GLB,, | Performed by: INTERNAL MEDICINE

## 2020-03-12 PROCEDURE — 3074F PR MOST RECENT SYSTOLIC BLOOD PRESSURE < 130 MM HG: ICD-10-PCS | Mod: HCNC,CPTII,S$GLB, | Performed by: INTERNAL MEDICINE

## 2020-03-12 PROCEDURE — 1159F PR MEDICATION LIST DOCUMENTED IN MEDICAL RECORD: ICD-10-PCS | Mod: HCNC,S$GLB,, | Performed by: INTERNAL MEDICINE

## 2020-03-12 PROCEDURE — 1126F AMNT PAIN NOTED NONE PRSNT: CPT | Mod: HCNC,S$GLB,, | Performed by: INTERNAL MEDICINE

## 2020-03-12 PROCEDURE — 3078F PR MOST RECENT DIASTOLIC BLOOD PRESSURE < 80 MM HG: ICD-10-PCS | Mod: HCNC,CPTII,S$GLB, | Performed by: INTERNAL MEDICINE

## 2020-03-12 PROCEDURE — 99499 RISK ADDL DX/OHS AUDIT: ICD-10-PCS | Mod: HCNC,S$GLB,, | Performed by: INTERNAL MEDICINE

## 2020-03-12 PROCEDURE — 1126F PR PAIN SEVERITY QUANTIFIED, NO PAIN PRESENT: ICD-10-PCS | Mod: HCNC,S$GLB,, | Performed by: INTERNAL MEDICINE

## 2020-03-12 PROCEDURE — 99999 PR PBB SHADOW E&M-EST. PATIENT-LVL III: CPT | Mod: PBBFAC,HCNC,, | Performed by: INTERNAL MEDICINE

## 2020-03-12 PROCEDURE — 3074F SYST BP LT 130 MM HG: CPT | Mod: HCNC,CPTII,S$GLB, | Performed by: INTERNAL MEDICINE

## 2020-03-12 PROCEDURE — 99499 UNLISTED E&M SERVICE: CPT | Mod: HCNC,S$GLB,, | Performed by: INTERNAL MEDICINE

## 2020-03-12 PROCEDURE — 99214 OFFICE O/P EST MOD 30 MIN: CPT | Mod: HCNC,S$GLB,, | Performed by: INTERNAL MEDICINE

## 2020-03-12 PROCEDURE — 99214 PR OFFICE/OUTPT VISIT, EST, LEVL IV, 30-39 MIN: ICD-10-PCS | Mod: HCNC,S$GLB,, | Performed by: INTERNAL MEDICINE

## 2020-03-12 PROCEDURE — 1101F PR PT FALLS ASSESS DOC 0-1 FALLS W/OUT INJ PAST YR: ICD-10-PCS | Mod: HCNC,CPTII,S$GLB, | Performed by: INTERNAL MEDICINE

## 2020-03-12 PROCEDURE — 93005 ELECTROCARDIOGRAM TRACING: CPT | Mod: HCNC

## 2020-03-12 NOTE — PROGRESS NOTES
Subjective:   Patient ID:  Violet Swenson is a 80 y.o. female who presents for follow up of Coronary Artery Disease (6 month f/u) and Postural hypotension      HPI  A 79 yo female with cad cardiomyopathy chf pacer is here for f/u her vertigo resolved . No leg swelling  she is short of breath with minimal exertion she can hold to a cart . Has no syncope near syncope no palpitation no  angina  No orthopnea. pnd   Past Medical History:   Diagnosis Date    Age-related osteoporosis without current pathological fracture 8/20/2018    Anemia     Anxiety     Arthritis     Atrial flutter     Cancer     lymphoma Large cell B    CHF (congestive heart failure)     Chronic anemia 4/26/2017    Chronic midline low back pain with right-sided sciatica 8/20/2018    Coronary artery disease     01/2015 LHC patent LCX. 50% stenosis in LAD and RCA.      Depression     Disorder of kidney and ureter     Encounter for blood transfusion     GERD (gastroesophageal reflux disease)     Gout, arthritis     Heart failure     Hx of psychiatric care     Hyperlipidemia     Hypertension     Hypothyroidism     Immune deficiency disorder     Kidney disease     Lung nodule 2014    RML--stable    Obesity     Pacemaker     Metronic    Paroxysmal atrial fibrillation 3/15/2018    Pneumonia     Polyneuropathy     chemo induced    Psychiatric problem     Tobacco dependence     quit 1976    Trouble in sleeping        Past Surgical History:   Procedure Laterality Date    APPENDECTOMY  1966 approx    CARDIAC PACEMAKER PLACEMENT  01/22/2015    CHOLECYSTECTOMY  1993    incidental at time of gastric bypass    COLON SURGERY Right 2017    hemicolectomy    COLONOSCOPY N/A 4/6/2017    Procedure: COLONOSCOPY;  Surgeon: Tye Enamorado MD;  Location: [facility];  Service: Endoscopy;  Laterality: N/A;    COLONOSCOPY N/A 11/28/2018    Procedure: COLONOSCOPY;  Surgeon: Saúl Arthur III, MD;  Location: [facility];  Service:  "Endoscopy;  Laterality: N/A;    CORONARY ANGIOPLASTY  2014    CORONARY STENT PLACEMENT  2014    ESOPHAGOGASTRODUODENOSCOPY N/A 2018    Procedure: EGD (ESOPHAGOGASTRODUODENOSCOPY);  Surgeon: Saúl Arthur III, MD;  Location[facility];  Service: Endoscopy;  Laterality: N/A;    GASTRIC BYPASS      with incidental choly    HERNIA REPAIR      JOINT REPLACEMENT Bilateral 2009    3 months apart    TONSILLECTOMY         Social History     Tobacco Use    Smoking status: Former Smoker     Packs/day: 2.00     Years: 6.00     Pack years: 12.00     Last attempt to quit: 3/15/1976     Years since quittin.0    Smokeless tobacco: Never Used   Substance Use Topics    Alcohol use: No     Alcohol/week: 0.0 standard drinks    Drug use: No       Family History   Problem Relation Age of Onset    Heart disease Mother     Hypertension Mother     Cataracts Mother     Stomach cancer Father         "ulcers that turned to cancer"    Cancer Father         stomach    Pancreatic cancer Sister     Cancer Sister         pancreatic    Leukemia Brother         "leukemia which led to intestinal cancer"    Cataracts Brother     Cancer Brother         leukemia then later stomach cancer    Drug abuse Son     Cancer Son         prostate cancer    Prostate cancer Son     COPD Daughter     Asthma Daughter     Hypertension Maternal Grandfather     Stroke Maternal Grandfather     Alcohol abuse Neg Hx     Diabetes Neg Hx     Mental retardation Neg Hx     Mental illness Neg Hx        Current Outpatient Medications   Medication Sig    allopurinoL (ZYLOPRIM) 300 MG tablet TAKE ONE TABLET BY MOUTH EVERY DAY    ascorbic acid, vitamin C, (VITAMIN C) 100 MG tablet Take by mouth once daily.    aspirin (ECOTRIN) 81 MG EC tablet Take 81 mg by mouth nightly.     busPIRone (BUSPAR) 7.5 MG tablet Take 1 tablet (7.5 mg total) by mouth 3 (three) times daily.    calcitRIOL (ROCALTROL) 0.25 MCG Cap TAKE ONE " CAPSULE BY MOUTH EVERY MONDAY, WEDNESDAY AND FRIDAY    clopidogrel (PLAVIX) 75 mg tablet TAKE ONE TABLET BY MOUTH EVERY DAY    COLCRYS 0.6 mg tablet TAKE ONE TABLET BY MOUTH EVERY DAY    diclofenac sodium 1 % Gel Apply 2 g topically once daily. Apply 2 g over painful joints once or twice a day.    fluocinolone (SYNALAR) 0.01 % external solution AAA of scalp twice a day prn itching.    fluticasone propionate (FLONASE) 50 mcg/actuation nasal spray 2 sprays (100 mcg total) by Each Nare route once daily.    furosemide (LASIX) 40 MG tablet Take 1 tab daily as needed for swelling, weight gain or shortness of breath    gabapentin (NEURONTIN) 300 MG capsule Take 1 capsule (300 mg total) by mouth 3 (three) times daily.    iron-vitamin C 100-250 mg, ICAR-C, 100-250 mg Tab TAKE ONE TABLET BY MOUTH EVERY DAY    levocetirizine (XYZAL) 5 MG tablet Take 1 tablet (5 mg total) by mouth every evening.    levothyroxine (SYNTHROID) 75 MCG tablet TAKE ONE TABLET BY MOUTH EVERY DAY BEFORE BREAKFAST    meloxicam (MOBIC) 7.5 MG tablet TAKE ONE TABLET BY MOUTH EVERY DAY AS NEEDED FOR PAIN    metoprolol tartrate (LOPRESSOR) 25 MG tablet TAKE ONE TABLET BY MOUTH TWICE DAILY    mirtazapine (REMERON SOL-TAB) 15 MG disintegrating tablet DISSOLVE ONE TABLET BY MOUTH NIGHTLY    multivitamin (ONE DAILY MULTIVITAMIN) per tablet Take 1 tablet by mouth once daily.    pravastatin (PRAVACHOL) 40 MG tablet TAKE ONE TABLET BY MOUTH ONCE DAILY    sertraline (ZOLOFT) 100 MG tablet TAKE 1.5 TABLETS BY MOUTH EVERY DAY    sodium bicarbonate 650 MG tablet TAKE ONE TABLET BY MOUTH TWICE DAILY    traMADol (ULTRAM) 50 mg tablet Take 1 tablet (50 mg total) by mouth every 12 (twelve) hours as needed for Pain.    tretinoin (RETIN-A) 0.025 % cream Apply a pea-sized amount to the entire face at bedtime.  Use every third night and increase as tolerated to nightly.    triamcinolone acetonide 0.025% (KENALOG) 0.025 % cream Apply topically 2 (two) times  daily. PRN itching.    ZINC ACETATE ORAL Take 250 mg by mouth once daily.     ergocalciferol (ERGOCALCIFEROL) 50,000 unit Cap Take 1 capsule (50,000 Units total) by mouth every 7 days. (Patient not taking: Reported on 3/12/2020)    ergocalciferol, vitamin D2, 400 unit Tab Take by mouth.    ranitidine (ZANTAC) 150 MG capsule Take 150 mg by mouth nightly.      Current Facility-Administered Medications   Medication    denosumab (PROLIA) injection 60 mg     Current Outpatient Medications on File Prior to Visit   Medication Sig    allopurinoL (ZYLOPRIM) 300 MG tablet TAKE ONE TABLET BY MOUTH EVERY DAY    ascorbic acid, vitamin C, (VITAMIN C) 100 MG tablet Take by mouth once daily.    aspirin (ECOTRIN) 81 MG EC tablet Take 81 mg by mouth nightly.     busPIRone (BUSPAR) 7.5 MG tablet Take 1 tablet (7.5 mg total) by mouth 3 (three) times daily.    calcitRIOL (ROCALTROL) 0.25 MCG Cap TAKE ONE CAPSULE BY MOUTH EVERY MONDAY, WEDNESDAY AND FRIDAY    clopidogrel (PLAVIX) 75 mg tablet TAKE ONE TABLET BY MOUTH EVERY DAY    COLCRYS 0.6 mg tablet TAKE ONE TABLET BY MOUTH EVERY DAY    diclofenac sodium 1 % Gel Apply 2 g topically once daily. Apply 2 g over painful joints once or twice a day.    fluocinolone (SYNALAR) 0.01 % external solution AAA of scalp twice a day prn itching.    fluticasone propionate (FLONASE) 50 mcg/actuation nasal spray 2 sprays (100 mcg total) by Each Nare route once daily.    furosemide (LASIX) 40 MG tablet Take 1 tab daily as needed for swelling, weight gain or shortness of breath    gabapentin (NEURONTIN) 300 MG capsule Take 1 capsule (300 mg total) by mouth 3 (three) times daily.    iron-vitamin C 100-250 mg, ICAR-C, 100-250 mg Tab TAKE ONE TABLET BY MOUTH EVERY DAY    levocetirizine (XYZAL) 5 MG tablet Take 1 tablet (5 mg total) by mouth every evening.    levothyroxine (SYNTHROID) 75 MCG tablet TAKE ONE TABLET BY MOUTH EVERY DAY BEFORE BREAKFAST    meloxicam (MOBIC) 7.5 MG tablet TAKE  ONE TABLET BY MOUTH EVERY DAY AS NEEDED FOR PAIN    metoprolol tartrate (LOPRESSOR) 25 MG tablet TAKE ONE TABLET BY MOUTH TWICE DAILY    mirtazapine (REMERON SOL-TAB) 15 MG disintegrating tablet DISSOLVE ONE TABLET BY MOUTH NIGHTLY    multivitamin (ONE DAILY MULTIVITAMIN) per tablet Take 1 tablet by mouth once daily.    pravastatin (PRAVACHOL) 40 MG tablet TAKE ONE TABLET BY MOUTH ONCE DAILY    sertraline (ZOLOFT) 100 MG tablet TAKE 1.5 TABLETS BY MOUTH EVERY DAY    sodium bicarbonate 650 MG tablet TAKE ONE TABLET BY MOUTH TWICE DAILY    traMADol (ULTRAM) 50 mg tablet Take 1 tablet (50 mg total) by mouth every 12 (twelve) hours as needed for Pain.    tretinoin (RETIN-A) 0.025 % cream Apply a pea-sized amount to the entire face at bedtime.  Use every third night and increase as tolerated to nightly.    triamcinolone acetonide 0.025% (KENALOG) 0.025 % cream Apply topically 2 (two) times daily. PRN itching.    ZINC ACETATE ORAL Take 250 mg by mouth once daily.     ergocalciferol (ERGOCALCIFEROL) 50,000 unit Cap Take 1 capsule (50,000 Units total) by mouth every 7 days. (Patient not taking: Reported on 3/12/2020)    ergocalciferol, vitamin D2, 400 unit Tab Take by mouth.    ranitidine (ZANTAC) 150 MG capsule Take 150 mg by mouth nightly.      Current Facility-Administered Medications on File Prior to Visit   Medication    denosumab (PROLIA) injection 60 mg       Review of Systems   Constitution: Negative for diaphoresis, malaise/fatigue and weight gain.   HENT: Negative for hoarse voice.    Eyes: Negative for double vision and visual disturbance.   Cardiovascular: Negative for chest pain, claudication, cyanosis, dyspnea on exertion, irregular heartbeat, leg swelling, near-syncope, orthopnea, palpitations, paroxysmal nocturnal dyspnea and syncope.   Respiratory: Negative for cough, hemoptysis, shortness of breath and snoring.    Hematologic/Lymphatic: Negative for bleeding problem. Does not bruise/bleed  easily.   Skin: Negative for color change and poor wound healing.   Musculoskeletal: Negative for muscle cramps, muscle weakness and myalgias.   Gastrointestinal: Negative for bloating, abdominal pain, change in bowel habit, diarrhea, heartburn, hematemesis, hematochezia, melena and nausea.   Neurological: Negative for excessive daytime sleepiness, dizziness, headaches, light-headedness, loss of balance, numbness and weakness.   Psychiatric/Behavioral: Negative for memory loss. The patient does not have insomnia.    Allergic/Immunologic: Negative for hives.       Objective:   Physical Exam   Constitutional: She is oriented to person, place, and time. She appears well-developed and well-nourished. She does not appear ill. No distress.   HENT:   Head: Normocephalic and atraumatic.   Eyes: Pupils are equal, round, and reactive to light. EOM are normal. No scleral icterus.   Neck: Normal range of motion. Neck supple. Normal carotid pulses, no hepatojugular reflux and no JVD present. Carotid bruit is not present. No tracheal deviation present. No thyromegaly present.   Cardiovascular: Normal rate, regular rhythm, normal heart sounds, intact distal pulses and normal pulses. Exam reveals no gallop and no friction rub.   No murmur heard.  Pulses:       Carotid pulses are 2+ on the right side, and 2+ on the left side.       Radial pulses are 2+ on the right side, and 2+ on the left side.        Femoral pulses are 2+ on the right side, and 2+ on the left side.       Popliteal pulses are 2+ on the right side, and 2+ on the left side.        Dorsalis pedis pulses are 2+ on the right side, and 2+ on the left side.        Posterior tibial pulses are 2+ on the right side, and 2+ on the left side.   Pulmonary/Chest: Effort normal and breath sounds normal. No respiratory distress. She has no wheezes. She has no rhonchi. She has no rales. She exhibits no tenderness.   Pacer site well healed.   Abdominal: Soft. Normal appearance,  "normal aorta and bowel sounds are normal. She exhibits no distension, no abdominal bruit, no ascites and no pulsatile midline mass. There is no hepatomegaly. There is no tenderness.   Musculoskeletal: She exhibits no edema.        Right shoulder: She exhibits no deformity.   Neurological: She is alert and oriented to person, place, and time. She has normal strength. No cranial nerve deficit. Coordination normal.   Skin: Skin is warm. No rash noted. She is not diaphoretic. No cyanosis or erythema. Nails show no clubbing.   Psychiatric: She has a normal mood and affect. Her speech is normal and behavior is normal.   Nursing note and vitals reviewed.    Vitals:    03/12/20 1503 03/12/20 1509   BP: (!) 98/50 (!) 90/56   BP Location: Right arm Left arm   Patient Position: Sitting Sitting   BP Method: Small (Manual) Small (Manual)   Pulse: 83    SpO2: 97%    Weight: 65.3 kg (143 lb 15.4 oz)    Height: 5' 4" (1.626 m)      Lab Results   Component Value Date    CHOL 92 (L) 07/19/2018    CHOL 138 04/05/2017    CHOL 114 (L) 11/07/2016     Lab Results   Component Value Date    HDL 55 07/19/2018    HDL 58 04/05/2017    HDL 51 11/07/2016     Lab Results   Component Value Date    LDLCALC 26.4 (L) 07/19/2018    LDLCALC 62.8 (L) 04/05/2017    LDLCALC 41.8 (L) 11/07/2016     Lab Results   Component Value Date    TRIG 53 07/19/2018    TRIG 86 04/05/2017    TRIG 106 11/07/2016     Lab Results   Component Value Date    CHOLHDL 59.8 (H) 07/19/2018    CHOLHDL 42.0 04/05/2017    CHOLHDL 44.7 11/07/2016       Chemistry        Component Value Date/Time     02/26/2020 1324    K 5.2 (H) 02/26/2020 1324     (H) 02/26/2020 1324    CO2 23 02/26/2020 1324    BUN 36 (H) 02/26/2020 1324    CREATININE 1.6 (H) 02/26/2020 1324    GLU 60 (L) 02/26/2020 1324        Component Value Date/Time    CALCIUM 8.6 (L) 02/26/2020 1324    ALKPHOS 100 02/26/2020 1324    AST 46 (H) 02/26/2020 1324    ALT 47 (H) 02/26/2020 1324    BILITOT 0.2 02/26/2020 " 1324    ESTGFRAFRICA 35.1 (A) 02/26/2020 1324    EGFRNONAA 30.4 (A) 02/26/2020 1324          Lab Results   Component Value Date    TSH 5.071 (H) 02/25/2019     Lab Results   Component Value Date    INR 1.0 11/30/2018    INR 1.0 09/01/2017    INR 1.0 04/05/2017     Lab Results   Component Value Date    WBC 9.10 02/26/2020    HGB 10.4 (L) 02/26/2020    HCT 33.4 (L) 02/26/2020     (H) 02/26/2020     02/26/2020     BMP  Sodium   Date Value Ref Range Status   02/26/2020 140 136 - 145 mmol/L Final     Potassium   Date Value Ref Range Status   02/26/2020 5.2 (H) 3.5 - 5.1 mmol/L Final     Chloride   Date Value Ref Range Status   02/26/2020 111 (H) 95 - 110 mmol/L Final     CO2   Date Value Ref Range Status   02/26/2020 23 23 - 29 mmol/L Final     BUN, Bld   Date Value Ref Range Status   02/26/2020 36 (H) 8 - 23 mg/dL Final     Creatinine   Date Value Ref Range Status   02/26/2020 1.6 (H) 0.5 - 1.4 mg/dL Final     Calcium   Date Value Ref Range Status   02/26/2020 8.6 (L) 8.7 - 10.5 mg/dL Final     Anion Gap   Date Value Ref Range Status   02/26/2020 6 (L) 8 - 16 mmol/L Final     eGFR if    Date Value Ref Range Status   02/26/2020 35.1 (A) >60 mL/min/1.73 m^2 Final     eGFR if non    Date Value Ref Range Status   02/26/2020 30.4 (A) >60 mL/min/1.73 m^2 Final     Comment:     Calculation used to obtain the estimated glomerular filtration  rate (eGFR) is the CKD-EPI equation.        CrCl cannot be calculated (Patient's most recent lab result is older than the maximum 7 days allowed.).    Assessment:     1. Coronary artery disease : multiple vessels, s/p PTCA    2. Essential hypertension    3. Mixed hyperlipidemia    4. Hypothyroidism (acquired)    5. Pacemaker    6. Idiopathic chronic gout of multiple sites without tophus    7. Ischemic cardiomyopathy    8. Chronic anemia    9. S/P gastric bypass    10. Calcification of aorta    11. 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 Stage 3 chronic kidney disease    13. Chemotherapy-induced neuropathy    14. Paroxysmal atrial fibrillation    15. History of congestive heart failure    16. Diffusion capacity of lung (dl), decreased      Stable clinically no angina no chf   Plan:   Continue current therapy  Cardiac low salt diet.  Risk factor modification and excercise program.  F/u in 6 months with lipid cmp ..

## 2020-03-23 ENCOUNTER — DOCUMENTATION ONLY (OUTPATIENT)
Dept: REHABILITATION | Facility: HOSPITAL | Age: 80
End: 2020-03-23

## 2020-03-23 NOTE — PROGRESS NOTES
Outpatient Therapy Discharge Summary     Name: Violet Mcnally [facility] [place]  Clinic Number: [id number]    Therapy Diagnosis:        Encounter Diagnosis   Name Primary?    Chronic right shoulder pain Yes      Physician: Pamela Farr,*     Physician Orders: PT Eval and Treat   Medical Diagnosis from Referral:  Evaluation Date: 1/31/2020    Date of Last visit:2/19/2020  Total Visits Received: 5  Cancelled Visits: 0  No Show Visits: 4    Assessment    Goals: Short Term Goals: In 3 weeks   1.I with HEP  2.Patient to increase GH ROM from 90 to 100 degrees flexion    3.Patient to increase MMT strength from 3-/5 to 3/5    4.Patient to have pain less than 4/10 at all times.     Long Term Goals: In 6 weeks  1. Patient to demo increase in UE strength to 3+/5  2. Patient to have decreased pain to 2/10 at all times.  3. Patient to demo increase GH ROM to 120  4. Patient to perform daily activities including raising arm and lifting without limitation.    Discharge reason: Patient has not attended therapy since 2/19/2020    Plan   This patient is discharged from Physical Therapy

## 2020-03-24 ENCOUNTER — DOCUMENTATION ONLY (OUTPATIENT)
Dept: REHABILITATION | Facility: HOSPITAL | Age: 80
End: 2020-03-24

## 2020-03-24 DIAGNOSIS — E55.9 VITAMIN D DEFICIENCY: ICD-10-CM

## 2020-03-24 RX ORDER — ERGOCALCIFEROL 1.25 MG/1
CAPSULE ORAL
Qty: 12 CAPSULE | Refills: 3 | Status: SHIPPED | OUTPATIENT
Start: 2020-03-24 | End: 2021-02-24

## 2020-03-25 NOTE — PROGRESS NOTES
Outpatient Therapy Discharge Summary     Name: Violet [place]  Clinic Number: [id number]    Therapy Diagnosis:        Encounter Diagnoses   Name Primary?    Postural hypotension Yes    At risk for falls        Physician: Marti Coronel MD     Physician Orders: PT Eval and Treat   Medical Diagnosis from Referral: At risk for falls  Evaluation Date: 7/23/2019    Date of Last visit: 7/31/2019  Total Visits Received: 4  Cancelled Visits: 0  No Show Visits: 0    Assessment    Goals: Short Term Goals: 3 weeks   1.  The patient will be independent with home exercise program.  2.  The patient will improve TUG score to 15 seconds to indicate reduced fall risk.  3.  Improve LE strength from 4/5 to 4+/5 bilateral LEs     Long Term Goals: 6 weeks  1.  The patient will improve strength to 5/5 bilateral lower extremity  2.  The patient will have less than 3/10 pain.  3.  The patient will improve TUG score to 13 to indicate reduced fall risk.  4.  The patient will perform daily activities including community gait and ADLs without impairment.    Discharge reason: Patient has not attended therapy since 7/31/2019    Plan   This patient is discharged from Physical Therapy

## 2020-03-30 RX ORDER — METOPROLOL TARTRATE 25 MG/1
TABLET, FILM COATED ORAL
Qty: 60 TABLET | Refills: 11 | Status: SHIPPED | OUTPATIENT
Start: 2020-03-30 | End: 2020-12-01 | Stop reason: DRUGHIGH

## 2020-03-30 RX ORDER — SODIUM BICARBONATE 650 MG/1
TABLET ORAL
Qty: 60 TABLET | Refills: 11 | Status: SHIPPED | OUTPATIENT
Start: 2020-03-30 | End: 2021-07-02

## 2020-04-06 DIAGNOSIS — E03.9 HYPOTHYROIDISM (ACQUIRED): Chronic | ICD-10-CM

## 2020-04-06 DIAGNOSIS — F41.9 ANXIETY: ICD-10-CM

## 2020-04-06 RX ORDER — LEVOTHYROXINE SODIUM 75 UG/1
TABLET ORAL
Qty: 90 TABLET | Refills: 0 | Status: SHIPPED | OUTPATIENT
Start: 2020-04-06 | End: 2020-07-07

## 2020-04-06 RX ORDER — BUSPIRONE HYDROCHLORIDE 7.5 MG/1
7.5 TABLET ORAL 3 TIMES DAILY
Qty: 90 TABLET | Refills: 0 | Status: SHIPPED | OUTPATIENT
Start: 2020-04-06 | End: 2020-05-29

## 2020-04-09 PROBLEM — M25.511 CHRONIC RIGHT SHOULDER PAIN: Status: RESOLVED | Noted: 2020-01-31 | Resolved: 2020-04-09

## 2020-04-09 PROBLEM — G89.29 CHRONIC RIGHT SHOULDER PAIN: Status: RESOLVED | Noted: 2020-01-31 | Resolved: 2020-04-09

## 2020-04-23 DIAGNOSIS — F32.9 MAJOR DEPRESSION, CHRONIC: ICD-10-CM

## 2020-04-23 DIAGNOSIS — F41.8 SITUATIONAL ANXIETY: ICD-10-CM

## 2020-04-23 DIAGNOSIS — F51.05 INSOMNIA SECONDARY TO ANXIETY: ICD-10-CM

## 2020-04-23 DIAGNOSIS — F41.9 INSOMNIA SECONDARY TO ANXIETY: ICD-10-CM

## 2020-04-23 RX ORDER — SERTRALINE HYDROCHLORIDE 100 MG/1
TABLET, FILM COATED ORAL
Qty: 90 TABLET | Refills: 0 | Status: SHIPPED | OUTPATIENT
Start: 2020-04-23 | End: 2020-07-07

## 2020-05-27 ENCOUNTER — LAB VISIT (OUTPATIENT)
Dept: LAB | Facility: HOSPITAL | Age: 80
End: 2020-05-27
Attending: INTERNAL MEDICINE
Payer: MEDICARE

## 2020-05-27 ENCOUNTER — TELEPHONE (OUTPATIENT)
Dept: HEMATOLOGY/ONCOLOGY | Facility: CLINIC | Age: 80
End: 2020-05-27

## 2020-05-27 ENCOUNTER — OFFICE VISIT (OUTPATIENT)
Dept: HEMATOLOGY/ONCOLOGY | Facility: CLINIC | Age: 80
End: 2020-05-27
Payer: MEDICARE

## 2020-05-27 VITALS
OXYGEN SATURATION: 97 % | TEMPERATURE: 97 F | WEIGHT: 145.31 LBS | RESPIRATION RATE: 18 BRPM | DIASTOLIC BLOOD PRESSURE: 72 MMHG | HEART RATE: 92 BPM | BODY MASS INDEX: 24.81 KG/M2 | HEIGHT: 64 IN | SYSTOLIC BLOOD PRESSURE: 111 MMHG

## 2020-05-27 DIAGNOSIS — C83.38 DIFFUSE LARGE B-CELL LYMPHOMA OF LYMPH NODES OF MULTIPLE REGIONS: Primary | ICD-10-CM

## 2020-05-27 DIAGNOSIS — E87.5 HYPERKALEMIA: ICD-10-CM

## 2020-05-27 DIAGNOSIS — C85.83 LARGE CELL LYMPHOMA OF INTRA-ABDOMINAL LYMPH NODES: ICD-10-CM

## 2020-05-27 DIAGNOSIS — C85.83 LARGE CELL LYMPHOMA OF INTRA-ABDOMINAL LYMPH NODES: Primary | ICD-10-CM

## 2020-05-27 LAB
ALBUMIN SERPL BCP-MCNC: 3.4 G/DL (ref 3.5–5.2)
ALP SERPL-CCNC: 64 U/L (ref 55–135)
ALT SERPL W/O P-5'-P-CCNC: 66 U/L (ref 10–44)
ANION GAP SERPL CALC-SCNC: 4 MMOL/L (ref 8–16)
AST SERPL-CCNC: 68 U/L (ref 10–40)
BASOPHILS # BLD AUTO: 0.03 K/UL (ref 0–0.2)
BASOPHILS NFR BLD: 0.4 % (ref 0–1.9)
BILIRUB SERPL-MCNC: 0.2 MG/DL (ref 0.1–1)
BUN SERPL-MCNC: 30 MG/DL (ref 8–23)
CALCIUM SERPL-MCNC: 8.7 MG/DL (ref 8.7–10.5)
CHLORIDE SERPL-SCNC: 114 MMOL/L (ref 95–110)
CO2 SERPL-SCNC: 22 MMOL/L (ref 23–29)
CREAT SERPL-MCNC: 1.3 MG/DL (ref 0.5–1.4)
DIFFERENTIAL METHOD: ABNORMAL
EOSINOPHIL # BLD AUTO: 0.2 K/UL (ref 0–0.5)
EOSINOPHIL NFR BLD: 3.4 % (ref 0–8)
ERYTHROCYTE [DISTWIDTH] IN BLOOD BY AUTOMATED COUNT: 15.8 % (ref 11.5–14.5)
EST. GFR  (AFRICAN AMERICAN): 45 ML/MIN/1.73 M^2
EST. GFR  (NON AFRICAN AMERICAN): 39 ML/MIN/1.73 M^2
FERRITIN SERPL-MCNC: 70 NG/ML (ref 20–300)
GLUCOSE SERPL-MCNC: 91 MG/DL (ref 70–110)
HCT VFR BLD AUTO: 34.3 % (ref 37–48.5)
HGB BLD-MCNC: 10.4 G/DL (ref 12–16)
IMM GRANULOCYTES # BLD AUTO: 0.03 K/UL (ref 0–0.04)
IMM GRANULOCYTES NFR BLD AUTO: 0.4 % (ref 0–0.5)
IRON SERPL-MCNC: 81 UG/DL (ref 30–160)
LYMPHOCYTES # BLD AUTO: 3.3 K/UL (ref 1–4.8)
LYMPHOCYTES NFR BLD: 46.6 % (ref 18–48)
MCH RBC QN AUTO: 32.9 PG (ref 27–31)
MCHC RBC AUTO-ENTMCNC: 30.3 G/DL (ref 32–36)
MCV RBC AUTO: 109 FL (ref 82–98)
MONOCYTES # BLD AUTO: 0.5 K/UL (ref 0.3–1)
MONOCYTES NFR BLD: 6.7 % (ref 4–15)
NEUTROPHILS # BLD AUTO: 3 K/UL (ref 1.8–7.7)
NEUTROPHILS NFR BLD: 42.5 % (ref 38–73)
NRBC BLD-RTO: 0 /100 WBC
PLATELET # BLD AUTO: 197 K/UL (ref 150–350)
PMV BLD AUTO: 9.6 FL (ref 9.2–12.9)
POTASSIUM SERPL-SCNC: 5.8 MMOL/L (ref 3.5–5.1)
PROT SERPL-MCNC: 6.7 G/DL (ref 6–8.4)
RBC # BLD AUTO: 3.16 M/UL (ref 4–5.4)
SATURATED IRON: 18 % (ref 20–50)
SODIUM SERPL-SCNC: 140 MMOL/L (ref 136–145)
TOTAL IRON BINDING CAPACITY: 440 UG/DL (ref 250–450)
TRANSFERRIN SERPL-MCNC: 297 MG/DL (ref 200–375)
WBC # BLD AUTO: 7.06 K/UL (ref 3.9–12.7)

## 2020-05-27 PROCEDURE — 1159F MED LIST DOCD IN RCRD: CPT | Mod: HCNC,S$GLB,, | Performed by: INTERNAL MEDICINE

## 2020-05-27 PROCEDURE — 1101F PT FALLS ASSESS-DOCD LE1/YR: CPT | Mod: HCNC,CPTII,S$GLB, | Performed by: INTERNAL MEDICINE

## 2020-05-27 PROCEDURE — 99999 PR PBB SHADOW E&M-EST. PATIENT-LVL III: ICD-10-PCS | Mod: PBBFAC,HCNC,, | Performed by: INTERNAL MEDICINE

## 2020-05-27 PROCEDURE — 36415 COLL VENOUS BLD VENIPUNCTURE: CPT | Mod: HCNC

## 2020-05-27 PROCEDURE — 99214 OFFICE O/P EST MOD 30 MIN: CPT | Mod: HCNC,S$GLB,, | Performed by: INTERNAL MEDICINE

## 2020-05-27 PROCEDURE — 99499 RISK ADDL DX/OHS AUDIT: ICD-10-PCS | Mod: HCNC,S$GLB,, | Performed by: INTERNAL MEDICINE

## 2020-05-27 PROCEDURE — 1126F AMNT PAIN NOTED NONE PRSNT: CPT | Mod: HCNC,S$GLB,, | Performed by: INTERNAL MEDICINE

## 2020-05-27 PROCEDURE — 3074F PR MOST RECENT SYSTOLIC BLOOD PRESSURE < 130 MM HG: ICD-10-PCS | Mod: HCNC,CPTII,S$GLB, | Performed by: INTERNAL MEDICINE

## 2020-05-27 PROCEDURE — 99999 PR PBB SHADOW E&M-EST. PATIENT-LVL III: CPT | Mod: PBBFAC,HCNC,, | Performed by: INTERNAL MEDICINE

## 2020-05-27 PROCEDURE — 3074F SYST BP LT 130 MM HG: CPT | Mod: HCNC,CPTII,S$GLB, | Performed by: INTERNAL MEDICINE

## 2020-05-27 PROCEDURE — 99499 UNLISTED E&M SERVICE: CPT | Mod: HCNC,S$GLB,, | Performed by: INTERNAL MEDICINE

## 2020-05-27 PROCEDURE — 1126F PR PAIN SEVERITY QUANTIFIED, NO PAIN PRESENT: ICD-10-PCS | Mod: HCNC,S$GLB,, | Performed by: INTERNAL MEDICINE

## 2020-05-27 PROCEDURE — 3078F PR MOST RECENT DIASTOLIC BLOOD PRESSURE < 80 MM HG: ICD-10-PCS | Mod: HCNC,CPTII,S$GLB, | Performed by: INTERNAL MEDICINE

## 2020-05-27 PROCEDURE — 1101F PR PT FALLS ASSESS DOC 0-1 FALLS W/OUT INJ PAST YR: ICD-10-PCS | Mod: HCNC,CPTII,S$GLB, | Performed by: INTERNAL MEDICINE

## 2020-05-27 PROCEDURE — 83540 ASSAY OF IRON: CPT | Mod: HCNC

## 2020-05-27 PROCEDURE — 99214 PR OFFICE/OUTPT VISIT, EST, LEVL IV, 30-39 MIN: ICD-10-PCS | Mod: HCNC,S$GLB,, | Performed by: INTERNAL MEDICINE

## 2020-05-27 PROCEDURE — 80053 COMPREHEN METABOLIC PANEL: CPT | Mod: HCNC

## 2020-05-27 PROCEDURE — 82728 ASSAY OF FERRITIN: CPT | Mod: HCNC

## 2020-05-27 PROCEDURE — 85025 COMPLETE CBC W/AUTO DIFF WBC: CPT | Mod: HCNC

## 2020-05-27 PROCEDURE — 3078F DIAST BP <80 MM HG: CPT | Mod: HCNC,CPTII,S$GLB, | Performed by: INTERNAL MEDICINE

## 2020-05-27 PROCEDURE — 1159F PR MEDICATION LIST DOCUMENTED IN MEDICAL RECORD: ICD-10-PCS | Mod: HCNC,S$GLB,, | Performed by: INTERNAL MEDICINE

## 2020-05-27 RX ORDER — SODIUM POLYSTYRENE SULFONATE 4.1 MEQ/G
15 POWDER, FOR SUSPENSION ORAL; RECTAL 2 TIMES DAILY
Qty: 1 BOTTLE | Refills: 0 | Status: SHIPPED | OUTPATIENT
Start: 2020-05-27 | End: 2020-09-08

## 2020-05-27 NOTE — TELEPHONE ENCOUNTER
Left message for patient letting her know that her potassium level is high and Dr. Matson is calling her in some medication to take.

## 2020-05-27 NOTE — PROGRESS NOTES
Subjective:       Patient ID: Violet Swenson is a 80 y.o. female.    Chief Complaint: No chief complaint on file.    HPI  This 80 year old  lady  comes for follow up of her th diffuse large cell lymphoma.   A CT of   the abdomen done on 04/05/2017, done because of abdominal pain ,  was reported as showing a 6.9 x 7.0 x 8.4 cm soft  tissue mass in the right lower quadrant in the terminal ileum abutting the   cecum. There was adjacent conglomerate adenopathy in the right lower quadrant   mesentery.     The patient had a colonoscopy that showed a lesion in the terminal ileum.     She underwent a right hemicolectomy and terminal ileum removal. 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  She had a Ct/PET that showed evidence of surgery and some non specific findings, but no evidence of active disease elsewhere.  An ECHO showed normal cardiac ejection fraction, as well as a MUGA.  She was seen cardiology and cleared for ADRIAMYCIN administration.  She was negative for Hep B/C and HIV  Bone marrow was negative.     The patient started  R-CHOP. Treatment  She tolerated the treatment with some minor difficulties. After 3 cycles, she had a repeat CT/PET  There was no activity in the abdomen.  There was development of ground glass opacities/infiltrates in both lungs which were hypermetabolic although the SUV was not reported.  We discussed the imaging studies with Radiology and Pulmonary.  Dr Corral of the Pulmonary Department favored the findings to be due to pulmonary congestion.  Her troponin was  normal, but her BNP was markedly elevated at 1,103.  She was started on Lasix by dr Corral and asked to have a  Ct in few weeks  The patient   had evaluation by Cardiology ( dr Gil).It was felt had developed CHF., with a decrease in her ejection traction to 47%., elevated BNP and imaging studies.  The patient indicated her leg  edema and SOB   improved on lasix.  Repeat PET showed clearance of all the infiltrates  Since, she has had a complete work including cardiac catheterization with negative findings.  A She comes for follow up. She had a CT/PET in 2018 that shows no evidence of recurrence. Repeat CT/PET 2018  A 2019 , and 2029 showed also no evidence of recurrence     Follwing her chemotherapy she has had a macrocytic anemia with normal b12/folate levels.    Says she is doing well,a nd is delalng with the COVID-19 issues without problems.  She has some problems associated with her concerns related to her  should something happens to liliya   ALLERGIES:see med card     MEDICATIONS: See MedCard.     PREVIOUS SURGERIES: Appendectomy in , tonsillectomy at age 19, gastric   bypass with incidental cholecystectomy, bilateral knee replacement in .     SOCIAL HISTORY: She is . She had two natural children, and one adopted   one. The adopted child has . She lives in [place] with her   . She smoked for two years, averaging a pack a day. She stopped 35   years ago or so. Denies any alcohol intake. She worked in [employer].     FAMILY HISTORY: Father  of colon cancer. Younger brother had leukemia that  transform into a lymphoma. Sister had pancreatic cancer          Review of Systems   Constitutional: Negative.    HENT: Negative.    Eyes: Negative.    Respiratory: Negative.  Negative for cough and wheezing.    Cardiovascular: Negative.  Negative for chest pain.   Gastrointestinal: Negative.    Genitourinary: Negative.    Neurological: Negative.    Psychiatric/Behavioral: Negative.        Objective:      Physical Exam   Constitutional: She is oriented to person, place, and time. She appears well-developed. No distress.   HENT:   Head: Normocephalic.   Right Ear: Tympanic membrane, external ear and ear canal normal.   Left Ear: Tympanic membrane, external ear and ear canal normal.   Nose: Nose normal. Right sinus exhibits no  maxillary sinus tenderness and no frontal sinus tenderness. Left sinus exhibits no maxillary sinus tenderness and no frontal sinus tenderness.   Mouth/Throat: Oropharynx is clear and moist and mucous membranes are normal.   Teeth normal.  Gums normal.   Eyes: Pupils are equal, round, and reactive to light. Conjunctivae and lids are normal.   Neck: Normal carotid pulses, no hepatojugular reflux and no JVD present. Carotid bruit is not present. No tracheal deviation present. No thyroid mass and no thyromegaly present.   Cardiovascular: Normal rate, regular rhythm, S1 normal, S2 normal, normal heart sounds and intact distal pulses. Exam reveals no gallop and no friction rub.   No murmur heard.  Carotid exam normal   Pulmonary/Chest: Effort normal and breath sounds normal. No accessory muscle usage. No respiratory distress. She has no wheezes. She has no rales. She exhibits no tenderness.   Abdominal: Soft. Normal appearance. She exhibits no distension and no mass. There is no splenomegaly or hepatomegaly. There is no tenderness. There is no rebound and no guarding.   Musculoskeletal: Normal range of motion. She exhibits no edema or tenderness.        Right hand: Normal.        Left hand: Normal.   Nails normal   Lymphadenopathy:     She has no cervical adenopathy.   Neurological: She is alert and oriented to person, place, and time. No cranial nerve deficit. Coordination normal.   Skin: Skin is warm and dry. No rash noted. She is not diaphoretic. No erythema. No pallor.   Psychiatric: She has a normal mood and affect. Her behavior is normal. Judgment and thought content normal.       Wt Readings from Last 3 Encounters:   05/27/20 65.9 kg (145 lb 4.5 oz)   03/12/20 65.3 kg (143 lb 15.4 oz)   03/09/20 66.3 kg (146 lb 2.6 oz)     Temp Readings from Last 3 Encounters:   05/27/20 96.8 °F (36 °C) (Temporal)   03/10/20 97.1 °F (36.2 °C)   03/10/20 97.9 °F (36.6 °C)     BP Readings from Last 3 Encounters:   05/27/20 111/72    03/12/20 (!) 90/56   03/10/20 129/83     Pulse Readings from Last 3 Encounters:   05/27/20 92   03/12/20 83   03/10/20 78       Assessment:       1. 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 See us back in 3 months with a cbc/cmp/feritin and tibc.  We will ask her to [place] to our  Department regarding potential solutions related to her other concerns       ADDENDUM:  Serum potassium was 5.8 meq/Lt    Asked to take Kayaxelate bid and repeat potassium in 2 days

## 2020-05-28 ENCOUNTER — TELEPHONE (OUTPATIENT)
Dept: HEMATOLOGY/ONCOLOGY | Facility: CLINIC | Age: 80
End: 2020-05-28

## 2020-05-28 NOTE — TELEPHONE ENCOUNTER
----- Message from Da Matson MD sent at 5/27/2020  4:05 PM CDT -----  Please let hr know her chemnestry test showed her potassium was a little high. She needs to take a medication called KAYAXELTE twice a day. I sent the Rx for her pharmacy of record.   She needs to see me again Friday to repeat the potassium level  Thanks  Dr matson

## 2020-05-29 ENCOUNTER — SOCIAL WORK (OUTPATIENT)
Dept: HEMATOLOGY/ONCOLOGY | Facility: CLINIC | Age: 80
End: 2020-05-29

## 2020-05-29 ENCOUNTER — LAB VISIT (OUTPATIENT)
Dept: LAB | Facility: HOSPITAL | Age: 80
End: 2020-05-29
Attending: INTERNAL MEDICINE
Payer: MEDICARE

## 2020-05-29 ENCOUNTER — OFFICE VISIT (OUTPATIENT)
Dept: HEMATOLOGY/ONCOLOGY | Facility: CLINIC | Age: 80
End: 2020-05-29
Payer: MEDICARE

## 2020-05-29 VITALS
DIASTOLIC BLOOD PRESSURE: 74 MMHG | WEIGHT: 146.19 LBS | TEMPERATURE: 98 F | HEIGHT: 64 IN | SYSTOLIC BLOOD PRESSURE: 112 MMHG | RESPIRATION RATE: 18 BRPM | HEART RATE: 89 BPM | BODY MASS INDEX: 24.96 KG/M2 | OXYGEN SATURATION: 99 %

## 2020-05-29 DIAGNOSIS — C85.83 LARGE CELL LYMPHOMA OF INTRA-ABDOMINAL LYMPH NODES: Primary | Chronic | ICD-10-CM

## 2020-05-29 DIAGNOSIS — C85.83 LARGE CELL LYMPHOMA OF INTRA-ABDOMINAL LYMPH NODES: ICD-10-CM

## 2020-05-29 DIAGNOSIS — G62.9 NEUROPATHY: ICD-10-CM

## 2020-05-29 DIAGNOSIS — E87.5 HYPERKALEMIA: ICD-10-CM

## 2020-05-29 DIAGNOSIS — F51.01 PRIMARY INSOMNIA: ICD-10-CM

## 2020-05-29 DIAGNOSIS — F41.9 ANXIETY: ICD-10-CM

## 2020-05-29 LAB — POTASSIUM SERPL-SCNC: 4.8 MMOL/L (ref 3.5–5.1)

## 2020-05-29 PROCEDURE — 3074F PR MOST RECENT SYSTOLIC BLOOD PRESSURE < 130 MM HG: ICD-10-PCS | Mod: HCNC,CPTII,S$GLB, | Performed by: INTERNAL MEDICINE

## 2020-05-29 PROCEDURE — 84132 ASSAY OF SERUM POTASSIUM: CPT | Mod: HCNC

## 2020-05-29 PROCEDURE — 99999 PR PBB SHADOW E&M-EST. PATIENT-LVL V: CPT | Mod: PBBFAC,HCNC,, | Performed by: INTERNAL MEDICINE

## 2020-05-29 PROCEDURE — 1159F MED LIST DOCD IN RCRD: CPT | Mod: HCNC,S$GLB,, | Performed by: INTERNAL MEDICINE

## 2020-05-29 PROCEDURE — 3078F DIAST BP <80 MM HG: CPT | Mod: HCNC,CPTII,S$GLB, | Performed by: INTERNAL MEDICINE

## 2020-05-29 PROCEDURE — 1101F PR PT FALLS ASSESS DOC 0-1 FALLS W/OUT INJ PAST YR: ICD-10-PCS | Mod: HCNC,CPTII,S$GLB, | Performed by: INTERNAL MEDICINE

## 2020-05-29 PROCEDURE — 99214 OFFICE O/P EST MOD 30 MIN: CPT | Mod: HCNC,S$GLB,, | Performed by: INTERNAL MEDICINE

## 2020-05-29 PROCEDURE — 99214 PR OFFICE/OUTPT VISIT, EST, LEVL IV, 30-39 MIN: ICD-10-PCS | Mod: HCNC,S$GLB,, | Performed by: INTERNAL MEDICINE

## 2020-05-29 PROCEDURE — 1159F PR MEDICATION LIST DOCUMENTED IN MEDICAL RECORD: ICD-10-PCS | Mod: HCNC,S$GLB,, | Performed by: INTERNAL MEDICINE

## 2020-05-29 PROCEDURE — 99499 UNLISTED E&M SERVICE: CPT | Mod: HCNC,S$GLB,, | Performed by: INTERNAL MEDICINE

## 2020-05-29 PROCEDURE — 3074F SYST BP LT 130 MM HG: CPT | Mod: HCNC,CPTII,S$GLB, | Performed by: INTERNAL MEDICINE

## 2020-05-29 PROCEDURE — 1101F PT FALLS ASSESS-DOCD LE1/YR: CPT | Mod: HCNC,CPTII,S$GLB, | Performed by: INTERNAL MEDICINE

## 2020-05-29 PROCEDURE — 1126F PR PAIN SEVERITY QUANTIFIED, NO PAIN PRESENT: ICD-10-PCS | Mod: HCNC,S$GLB,, | Performed by: INTERNAL MEDICINE

## 2020-05-29 PROCEDURE — 3078F PR MOST RECENT DIASTOLIC BLOOD PRESSURE < 80 MM HG: ICD-10-PCS | Mod: HCNC,CPTII,S$GLB, | Performed by: INTERNAL MEDICINE

## 2020-05-29 PROCEDURE — 36415 COLL VENOUS BLD VENIPUNCTURE: CPT | Mod: HCNC

## 2020-05-29 PROCEDURE — 1126F AMNT PAIN NOTED NONE PRSNT: CPT | Mod: HCNC,S$GLB,, | Performed by: INTERNAL MEDICINE

## 2020-05-29 PROCEDURE — 99499 RISK ADDL DX/OHS AUDIT: ICD-10-PCS | Mod: HCNC,S$GLB,, | Performed by: INTERNAL MEDICINE

## 2020-05-29 PROCEDURE — 99999 PR PBB SHADOW E&M-EST. PATIENT-LVL V: ICD-10-PCS | Mod: PBBFAC,HCNC,, | Performed by: INTERNAL MEDICINE

## 2020-05-29 RX ORDER — MIRTAZAPINE 15 MG/1
TABLET, ORALLY DISINTEGRATING ORAL
Qty: 30 TABLET | Refills: 2 | Status: SHIPPED | OUTPATIENT
Start: 2020-05-29 | End: 2020-08-26

## 2020-05-29 RX ORDER — BUSPIRONE HYDROCHLORIDE 7.5 MG/1
7.5 TABLET ORAL 3 TIMES DAILY
Qty: 90 TABLET | Refills: 0 | Status: SHIPPED | OUTPATIENT
Start: 2020-05-29 | End: 2020-06-05

## 2020-05-29 RX ORDER — GABAPENTIN 300 MG/1
CAPSULE ORAL
Qty: 90 CAPSULE | Refills: 3 | Status: SHIPPED | OUTPATIENT
Start: 2020-05-29 | End: 2020-09-28

## 2020-05-29 NOTE — PROGRESS NOTES
Subjective:       Patient ID: Violet Swenson is a 80 y.o. female.    Chief Complaint: Follow-up    HPI   This 80 year old  lady  comes for follow up of her  diffuse large cell lymphoma.   A CT of   the abdomen done on 04/05/2017, done because of abdominal pain ,  was reported as showing a 6.9 x 7.0 x 8.4 cm soft  tissue mass in the right lower quadrant in the terminal ileum abutting the   cecum. There was adjacent conglomerate adenopathy in the right lower quadrant   mesentery.     The patient had a colonoscopy that showed a lesion in the terminal ileum.     She underwent a right hemicolectomy and terminal ileum removal. 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  She had a Ct/PET that showed evidence of surgery and some non specific findings, but no evidence of active disease elsewhere.  An ECHO showed normal cardiac ejection fraction, as well as a MUGA.  She was seen cardiology and cleared for ADRIAMYCIN administration.  She was negative for Hep B/C and HIV  Bone marrow was negative.     The patient started  R-CHOP. Treatment  She tolerated the treatment with some minor difficulties. After 3 cycles, she had a repeat CT/PET  There was no activity in the abdomen.  There was development of ground glass opacities/infiltrates in both lungs which were hypermetabolic although the SUV was not reported.  We discussed the imaging studies with Radiology and Pulmonary.  Dr Corral of the Pulmonary Department favored the findings to be due to pulmonary congestion.  Her troponin was  normal, but her BNP was markedly elevated at 1,103.  She was started on Lasix by dr Corral and asked to have a  Ct in few weeks  The patient   had evaluation by Cardiology ( dr Gil).It was felt had developed CHF., with a decrease in her ejection traction to 47%., elevated BNP and imaging studies.  The patient indicated her leg  edema and SOB   improved on lasix. Repeat PET showed  clearance of all the infiltrates  Since, she has had a complete work including cardiac catheterization with negative findings.  A She comes for follow up. She had a CT/PET in 2018 that shows no evidence of recurrence. Repeat CT/PET 2018  A 2019 , and 2029 showed also no evidence of recurrence     Follwing her chemotherapy she has had a macrocytic anemia with normal b12/folate levels.    Says she is doing well,a nd is delalng with the COVID-19 issues without problems.  She has some problems associated with her concerns related to her  should something happens to her.    During the last visit 2 days ago she aas found to have a potassium level of 5.8 meq/lt. She denies drinking oral juice or eating bananas. She is not on ace inhibitors.  Her creatinine is 1.3  She was asked to start KAYAXELATE and come today for a repeat potassium level   ALLERGIES:see med card     MEDICATIONS: See MedCard.     PREVIOUS SURGERIES: Appendectomy in , tonsillectomy at age 19, gastric   bypass with incidental cholecystectomy, bilateral knee replacement in .     SOCIAL HISTORY: She is . She had two natural children, and one adopted   one. The adopted child has . She lives in [place] with her   . She smoked for two years, averaging a pack a day. She stopped 35   years ago or so. Denies any alcohol intake. She worked in accounting.     FAMILY HISTORY: Father  of colon cancer. Younger brother had leukemia that  transform into a lymphoma. Sister had pancreatic cancer          Review of Systems   Constitutional: Negative.    HENT: Negative.    Eyes: Negative.    Respiratory: Negative.  Negative for cough and wheezing.    Cardiovascular: Negative.  Negative for chest pain.   Gastrointestinal: Negative.    Genitourinary: Negative.    Neurological: Negative.    Psychiatric/Behavioral: Negative.        Objective:      Physical Exam   Constitutional: She is oriented to person, place, and time.  She appears well-developed. No distress.   HENT:   Head: Normocephalic.   Right Ear: Tympanic membrane and ear canal normal.   Left Ear: Tympanic membrane and ear canal normal.   Nose: Nose normal. Right sinus exhibits no maxillary sinus tenderness and no frontal sinus tenderness. Left sinus exhibits no maxillary sinus tenderness and no frontal sinus tenderness.   Mouth/Throat: Mucous membranes are normal.   Teeth normal.  Gums normal.   Eyes: Pupils are equal, round, and reactive to light. Conjunctivae and lids are normal.   Neck: Normal carotid pulses, no hepatojugular reflux and no JVD present. Carotid bruit is not present. No tracheal deviation present. No thyroid mass and no thyromegaly present.   Cardiovascular: Normal rate, S1 normal and S2 normal. Exam reveals no gallop and no friction rub.   No murmur heard.  Carotid exam normal   Pulmonary/Chest: Effort normal. No accessory muscle usage. No respiratory distress. She has no wheezes. She has no rales. She exhibits no tenderness.   Abdominal: Soft. Normal appearance. She exhibits no distension and no mass. There is no splenomegaly or hepatomegaly. There is no tenderness. There is no rebound and no guarding.   Musculoskeletal: Normal range of motion. She exhibits no edema or tenderness.        Right hand: Normal.        Left hand: Normal.   Nails normal   Lymphadenopathy:     She has no cervical adenopathy.   Neurological: She is alert and oriented to person, place, and time. No cranial nerve deficit. Coordination normal.   Skin: Skin is warm and dry. No rash noted. She is not diaphoretic. No erythema. No pallor.   Psychiatric: She has a normal mood and affect. Her behavior is normal. Judgment and thought content normal.       Wt Readings from Last 3 Encounters:   05/29/20 66.3 kg (146 lb 2.6 oz)   05/27/20 65.9 kg (145 lb 4.5 oz)   03/12/20 65.3 kg (143 lb 15.4 oz)     Temp Readings from Last 3 Encounters:   05/29/20 98.2 °F (36.8 °C) (Oral)   05/27/20 96.8  °F (36 °C) (Temporal)   03/10/20 97.1 °F (36.2 °C)     BP Readings from Last 3 Encounters:   05/29/20 112/74   05/27/20 111/72   03/12/20 (!) 90/56       Assessment:       1. 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 Hyperkalemia        Plan:       Potassium level today is 4.8 meq/Lt after several doses of Kayaxelate. Asked to avoid foods rich in potassium We will try to provide with a list of such foods.  Asked to keep appointments as scheduled during last visit

## 2020-05-29 NOTE — PROGRESS NOTES
"Met with pt who was referred to SW by Dr. Matson for SW support. She is familiar to SW due to hx of cancer treatment. She is well-groomed and in good spirits. Primary reason for needing to talk to SW was care for her , who has a terminal illness, if she is to predecease him (since she is 9 years his elder). Spend a good bit of time allowing her to process their history, family dynamics as it relates to where she is now.  can be difficult to deal with, is mostly estranged from his family, and she has two children that she does not believe will "deal with him." She says pt gets benefits through the [facility] (100% disabled due to agent orange). Encouraged her to start by contacting [facility] to determine what benefits he is eligible for, in terms of facility placement, should this be an option. Gave her SW contact info to call if she runs into any roadblocks, as there may be other resources that she can pursue. Pt self-describes as "the rock" of the family, and although this may be a sense of fulfillment for her, encouraged her to remember good self-care so that she is able to remain "the rock" of the family. Pt is not due to return until November, but encouraged her to call SW at any time if assistance is needed. SW will remain available to assist further as needed.  "

## 2020-07-07 DIAGNOSIS — F41.9 INSOMNIA SECONDARY TO ANXIETY: ICD-10-CM

## 2020-07-07 DIAGNOSIS — F41.8 SITUATIONAL ANXIETY: ICD-10-CM

## 2020-07-07 DIAGNOSIS — E03.9 HYPOTHYROIDISM (ACQUIRED): Chronic | ICD-10-CM

## 2020-07-07 DIAGNOSIS — F51.05 INSOMNIA SECONDARY TO ANXIETY: ICD-10-CM

## 2020-07-07 DIAGNOSIS — F41.9 ANXIETY: ICD-10-CM

## 2020-07-07 DIAGNOSIS — F32.9 MAJOR DEPRESSION, CHRONIC: ICD-10-CM

## 2020-07-07 RX ORDER — SERTRALINE HYDROCHLORIDE 100 MG/1
TABLET, FILM COATED ORAL
Qty: 90 TABLET | Refills: 0 | Status: SHIPPED | OUTPATIENT
Start: 2020-07-07 | End: 2020-09-24

## 2020-07-07 RX ORDER — BUSPIRONE HYDROCHLORIDE 7.5 MG/1
TABLET ORAL
Qty: 90 TABLET | Refills: 0 | Status: SHIPPED | OUTPATIENT
Start: 2020-07-07 | End: 2020-08-04

## 2020-07-07 RX ORDER — LEVOTHYROXINE SODIUM 75 UG/1
TABLET ORAL
Qty: 90 TABLET | Refills: 0 | Status: SHIPPED | OUTPATIENT
Start: 2020-07-07 | End: 2020-10-02

## 2020-07-29 RX ORDER — CALCITRIOL 0.25 UG/1
CAPSULE ORAL
Qty: 12 CAPSULE | Refills: 5 | Status: SHIPPED | OUTPATIENT
Start: 2020-07-29 | End: 2021-01-26

## 2020-08-10 ENCOUNTER — LAB VISIT (OUTPATIENT)
Dept: LAB | Facility: HOSPITAL | Age: 80
End: 2020-08-10
Attending: INTERNAL MEDICINE
Payer: MEDICARE

## 2020-08-10 ENCOUNTER — OFFICE VISIT (OUTPATIENT)
Dept: NEPHROLOGY | Facility: CLINIC | Age: 80
End: 2020-08-10
Payer: MEDICARE

## 2020-08-10 VITALS
HEART RATE: 88 BPM | WEIGHT: 142.44 LBS | DIASTOLIC BLOOD PRESSURE: 64 MMHG | BODY MASS INDEX: 24.32 KG/M2 | HEIGHT: 64 IN | SYSTOLIC BLOOD PRESSURE: 84 MMHG

## 2020-08-10 DIAGNOSIS — N14.0 ANALGESIC NEPHROPATHY: ICD-10-CM

## 2020-08-10 DIAGNOSIS — N18.31 CHRONIC KIDNEY DISEASE (CKD) STAGE G3A/A1, MODERATELY DECREASED GLOMERULAR FILTRATION RATE (GFR) BETWEEN 45-59 ML/MIN/1.73 SQUARE METER AND ALBUMINURIA CREATININE RATIO LESS THAN 30 MG/G: ICD-10-CM

## 2020-08-10 DIAGNOSIS — N17.9 AKI (ACUTE KIDNEY INJURY): Primary | ICD-10-CM

## 2020-08-10 DIAGNOSIS — E87.5 HYPERKALEMIA: ICD-10-CM

## 2020-08-10 DIAGNOSIS — E21.3 HPTH (HYPERPARATHYROIDISM): ICD-10-CM

## 2020-08-10 DIAGNOSIS — Q63.1 LOBULATED KIDNEY: ICD-10-CM

## 2020-08-10 DIAGNOSIS — E55.9 VITAMIN D DEFICIENCY: ICD-10-CM

## 2020-08-10 DIAGNOSIS — N27.0 SMALL KIDNEY, UNILATERAL: ICD-10-CM

## 2020-08-10 DIAGNOSIS — E87.20 ACIDOSIS: ICD-10-CM

## 2020-08-10 LAB — PTH-INTACT SERPL-MCNC: 254 PG/ML (ref 9–77)

## 2020-08-10 PROCEDURE — 99499 UNLISTED E&M SERVICE: CPT | Mod: HCNC,S$GLB,, | Performed by: INTERNAL MEDICINE

## 2020-08-10 PROCEDURE — 99499 RISK ADDL DX/OHS AUDIT: ICD-10-PCS | Mod: HCNC,S$GLB,, | Performed by: INTERNAL MEDICINE

## 2020-08-10 PROCEDURE — 3074F PR MOST RECENT SYSTOLIC BLOOD PRESSURE < 130 MM HG: ICD-10-PCS | Mod: HCNC,CPTII,S$GLB, | Performed by: INTERNAL MEDICINE

## 2020-08-10 PROCEDURE — 3074F SYST BP LT 130 MM HG: CPT | Mod: HCNC,CPTII,S$GLB, | Performed by: INTERNAL MEDICINE

## 2020-08-10 PROCEDURE — 99215 PR OFFICE/OUTPT VISIT, EST, LEVL V, 40-54 MIN: ICD-10-PCS | Mod: HCNC,S$GLB,, | Performed by: INTERNAL MEDICINE

## 2020-08-10 PROCEDURE — 99999 PR PBB SHADOW E&M-EST. PATIENT-LVL IV: ICD-10-PCS | Mod: PBBFAC,HCNC,, | Performed by: INTERNAL MEDICINE

## 2020-08-10 PROCEDURE — 99999 PR PBB SHADOW E&M-EST. PATIENT-LVL IV: CPT | Mod: PBBFAC,HCNC,, | Performed by: INTERNAL MEDICINE

## 2020-08-10 PROCEDURE — 1101F PR PT FALLS ASSESS DOC 0-1 FALLS W/OUT INJ PAST YR: ICD-10-PCS | Mod: HCNC,CPTII,S$GLB, | Performed by: INTERNAL MEDICINE

## 2020-08-10 PROCEDURE — 1125F PR PAIN SEVERITY QUANTIFIED, PAIN PRESENT: ICD-10-PCS | Mod: HCNC,S$GLB,, | Performed by: INTERNAL MEDICINE

## 2020-08-10 PROCEDURE — 1159F MED LIST DOCD IN RCRD: CPT | Mod: HCNC,S$GLB,, | Performed by: INTERNAL MEDICINE

## 2020-08-10 PROCEDURE — 3078F PR MOST RECENT DIASTOLIC BLOOD PRESSURE < 80 MM HG: ICD-10-PCS | Mod: HCNC,CPTII,S$GLB, | Performed by: INTERNAL MEDICINE

## 2020-08-10 PROCEDURE — 99215 OFFICE O/P EST HI 40 MIN: CPT | Mod: HCNC,S$GLB,, | Performed by: INTERNAL MEDICINE

## 2020-08-10 PROCEDURE — 1101F PT FALLS ASSESS-DOCD LE1/YR: CPT | Mod: HCNC,CPTII,S$GLB, | Performed by: INTERNAL MEDICINE

## 2020-08-10 PROCEDURE — 1125F AMNT PAIN NOTED PAIN PRSNT: CPT | Mod: HCNC,S$GLB,, | Performed by: INTERNAL MEDICINE

## 2020-08-10 PROCEDURE — 3078F DIAST BP <80 MM HG: CPT | Mod: HCNC,CPTII,S$GLB, | Performed by: INTERNAL MEDICINE

## 2020-08-10 PROCEDURE — 83970 ASSAY OF PARATHORMONE: CPT | Mod: HCNC

## 2020-08-10 PROCEDURE — 1159F PR MEDICATION LIST DOCUMENTED IN MEDICAL RECORD: ICD-10-PCS | Mod: HCNC,S$GLB,, | Performed by: INTERNAL MEDICINE

## 2020-08-10 RX ORDER — HYDROCODONE BITARTRATE AND ACETAMINOPHEN 10; 325 MG/1; MG/1
1 TABLET ORAL EVERY 6 HOURS PRN
Qty: 60 TABLET | Refills: 0 | Status: SHIPPED | OUTPATIENT
Start: 2020-08-10 | End: 2020-09-08 | Stop reason: SDUPTHER

## 2020-08-10 NOTE — PROGRESS NOTES
Subjective:       Patient ID: Violet Swenson is a 80 y.o. White female who presents for new evaluation of Acute Renal Failure and Chronic Kidney Disease    Hypertension  Pertinent negatives include no chest pain, headaches, palpitations or shortness of breath.        Patient is a 80-year-old female with history of hypertension and lymphoma.  Patient had chemotherapy 2017 resulting in congestive heart failure and cardiomyopathy.  Patient has had rise in LFTs.patient had been taking Motrin which 15 mg but was seen by Dr. PANIAGUA in rheumatology who reduced the Mobic down to 7.5 mg.  Patient has been seen by Dr. Morales in hepatology for increased LFTs.    February 2018 patient evaluated for rising creatinine.  Workup ordered.  Noted left sided atrophic kidney and lobulated kidney on the right side which is enlarged based on the CT scan from 2017 09/2018 s/p falls x 3 in last few weeks ; renal function stable     2/2020 severe dizziness consult audiology       August 2020 creatinine is up to 1.9 with acute kidney injury.  Recent hyperkalemia noted.  All oncology, Rheumatology and cardiology records reviewed. Weight is down by 5   Lb.  Stop Mobic.  Stop Lasix.  Follow-up in a 3-4 weeks  stop colchicine. Stop allopurinol     Review of Systems   Constitutional: Negative for activity change, appetite change, chills, diaphoresis, fatigue, fever and unexpected weight change.   HENT: Negative for congestion, dental problem, drooling, postnasal drip, rhinorrhea and voice change.    Eyes: Negative for discharge.   Respiratory: Negative for apnea, cough, choking, chest tightness, shortness of breath, wheezing and stridor.    Cardiovascular: Negative for chest pain, palpitations and leg swelling.   Gastrointestinal: Negative for abdominal distention, blood in stool, constipation, diarrhea, nausea, rectal pain and vomiting.   Endocrine: Negative for cold intolerance, heat intolerance, polydipsia and polyuria.  "  Genitourinary: Negative for decreased urine volume, difficulty urinating, dysuria, enuresis, flank pain, frequency, hematuria and urgency.   Musculoskeletal: Positive for arthralgias, joint swelling and myalgias. Negative for back pain and gait problem.   Skin: Negative for rash.   Allergic/Immunologic: Negative for food allergies and immunocompromised state.   Neurological: Negative for dizziness, tremors, syncope, numbness and headaches.   Hematological: Does not bruise/bleed easily.   Psychiatric/Behavioral: Negative for agitation, behavioral problems and self-injury. The patient is not nervous/anxious and is not hyperactive.    All other systems reviewed and are negative.      Objective:   BP (!) 84/64   Pulse 88   Ht 5' 4" (1.626 m)   Wt 64.6 kg (142 lb 6.7 oz)   BMI 24.45 kg/m²      Physical Exam  Vitals signs and nursing note reviewed.   Constitutional:       General: She is not in acute distress.     Appearance: She is not diaphoretic.   HENT:      Head: Normocephalic and atraumatic.      Nose: Nose normal.   Eyes:      Conjunctiva/sclera: Conjunctivae normal.      Pupils: Pupils are equal, round, and reactive to light.   Neck:      Musculoskeletal: Normal range of motion.      Thyroid: No thyromegaly.      Vascular: No JVD.      Trachea: No tracheal deviation.   Cardiovascular:      Rate and Rhythm: Normal rate and regular rhythm.      Heart sounds: Normal heart sounds. No murmur. No friction rub. No gallop.    Pulmonary:      Effort: Pulmonary effort is normal. No respiratory distress.      Breath sounds: Normal breath sounds. No wheezing or rales.   Chest:      Chest wall: No tenderness.   Abdominal:      General: Bowel sounds are normal. There is no distension.      Palpations: Abdomen is soft. There is no mass.      Tenderness: There is no abdominal tenderness.      Hernia: No hernia is present.   Musculoskeletal: Normal range of motion.         General: No tenderness or deformity.      Comments: " Walks with walker    Skin:     General: Skin is warm.      Coloration: Skin is pale.      Findings: No erythema.   Neurological:      Mental Status: She is alert and oriented to person, place, and time.      Cranial Nerves: No cranial nerve deficit.      Motor: No abnormal muscle tone.      Coordination: Coordination normal.      Deep Tendon Reflexes: Reflexes are normal and symmetric. Reflexes normal.   Psychiatric:         Behavior: Behavior normal.         Thought Content: Thought content normal.         Judgment: Judgment normal.           Lab Results   Component Value Date    CREATININE 1.9 (H) 08/10/2020    BUN 36 (H) 08/10/2020     08/10/2020    K 5.1 08/10/2020     (H) 08/10/2020    CO2 17 (L) 08/10/2020     Lab Results   Component Value Date    WBC 6.96 08/10/2020    HGB 10.3 (L) 08/10/2020    HCT 33.6 (L) 08/10/2020     (H) 08/10/2020     08/10/2020     Lab Results   Component Value Date    .0 (H) 12/02/2019    CALCIUM 9.0 08/10/2020    PHOS 4.8 (H) 08/10/2020        Lab Results   Component Value Date    URICACID 6.2 (H) 08/20/2018       Assessment:    )    1. NATHALIE (acute kidney injury)    2. Chronic kidney disease (CKD) stage G3a/A1, moderately decreased glomerular filtration rate (GFR) between 45-59 mL/min/1.73 square meter and albuminuria creatinine ratio less than 30 mg/g    3. Small kidney, unilateral    4. Lobulated kidney    5. Analgesic nephropathy    6. Acidosis    7. Hyperkalemia    8. HPTH (hyperparathyroidism)    9. Vitamin D deficiency        Plan:        1.   Acute kidney injury on Chronic kidney disease stage III:;   Likely side effect of Mobic.  Will have to stop Mobic at this time.  Stop Lasix.  Stop colchicine.  Hold allopurinol.  Follow-up in a week    2.  Small size kidneys/atrophic kidney on the left side:no renal artery stenosis     3. Vit D def:+ sec HPT: on weekly Vit D +  calcitriol 0.25 mcg MWF :  Calcium and phosphorus are staying stable    4.  Acidosis: on bicarbonate     5. Hyperkalemia: due to acidosis; off cozaar;  DC mobic  Patient is on Kayexalate at this time 2 times / day     6. Gout + arthritis : stop colchicine; hold allopurinol. Stop Mobic ; Norco 10 and can give percoset if needed             follow-up  1- months

## 2020-08-10 NOTE — PATIENT INSTRUCTIONS
1.   Acute kidney injury on Chronic kidney disease stage III:;   Likely side effect of Mobic.  Will have to stop Mobic at this time.  Stop Lasix.  Stop colchicine.  Hold allopurinol.  Follow-up in a week    2.  Small size kidneys/atrophic kidney on the left side:no renal artery stenosis     3. Vit D def:+ sec HPT: on weekly Vit D +  calcitriol 0.25 mcg MWF :  Calcium and phosphorus are staying stable    4. Acidosis: on bicarbonate     5. Hyperkalemia: due to acidosis; off cozaar;  DC mobic  Patient is on Kayexalate at this time 2 times / day     6. Gout + arthritis : stop colchicine; hold allopurinol. Stop Mobic ; Norco 10 and can give percoset if needed             follow-up  1- months

## 2020-08-24 ENCOUNTER — LAB VISIT (OUTPATIENT)
Dept: LAB | Facility: HOSPITAL | Age: 80
End: 2020-08-24
Attending: INTERNAL MEDICINE
Payer: MEDICARE

## 2020-08-24 DIAGNOSIS — D64.9 CHRONIC ANEMIA: Chronic | ICD-10-CM

## 2020-08-24 DIAGNOSIS — C85.83 LARGE CELL LYMPHOMA OF INTRA-ABDOMINAL LYMPH NODES: Chronic | ICD-10-CM

## 2020-08-24 LAB
ALBUMIN SERPL BCP-MCNC: 3.3 G/DL (ref 3.5–5.2)
ALP SERPL-CCNC: 65 U/L (ref 55–135)
ALT SERPL W/O P-5'-P-CCNC: 23 U/L (ref 10–44)
ANION GAP SERPL CALC-SCNC: 5 MMOL/L (ref 8–16)
AST SERPL-CCNC: 32 U/L (ref 10–40)
BASOPHILS # BLD AUTO: 0.04 K/UL (ref 0–0.2)
BASOPHILS NFR BLD: 0.7 % (ref 0–1.9)
BILIRUB SERPL-MCNC: 0.2 MG/DL (ref 0.1–1)
BUN SERPL-MCNC: 18 MG/DL (ref 8–23)
CALCIUM SERPL-MCNC: 8.9 MG/DL (ref 8.7–10.5)
CHLORIDE SERPL-SCNC: 114 MMOL/L (ref 95–110)
CO2 SERPL-SCNC: 22 MMOL/L (ref 23–29)
CREAT SERPL-MCNC: 1.1 MG/DL (ref 0.5–1.4)
DIFFERENTIAL METHOD: ABNORMAL
EOSINOPHIL # BLD AUTO: 0.3 K/UL (ref 0–0.5)
EOSINOPHIL NFR BLD: 4.2 % (ref 0–8)
ERYTHROCYTE [DISTWIDTH] IN BLOOD BY AUTOMATED COUNT: 16.5 % (ref 11.5–14.5)
EST. GFR  (AFRICAN AMERICAN): 55.2 ML/MIN/1.73 M^2
EST. GFR  (NON AFRICAN AMERICAN): 47.9 ML/MIN/1.73 M^2
FERRITIN SERPL-MCNC: 95 NG/ML (ref 20–300)
GLUCOSE SERPL-MCNC: 78 MG/DL (ref 70–110)
HCT VFR BLD AUTO: 33 % (ref 37–48.5)
HGB BLD-MCNC: 9.9 G/DL (ref 12–16)
IMM GRANULOCYTES # BLD AUTO: 0.01 K/UL (ref 0–0.04)
IMM GRANULOCYTES NFR BLD AUTO: 0.2 % (ref 0–0.5)
IRON SERPL-MCNC: 61 UG/DL (ref 30–160)
LDH SERPL L TO P-CCNC: 181 U/L (ref 110–260)
LYMPHOCYTES # BLD AUTO: 3 K/UL (ref 1–4.8)
LYMPHOCYTES NFR BLD: 50.2 % (ref 18–48)
MCH RBC QN AUTO: 33.1 PG (ref 27–31)
MCHC RBC AUTO-ENTMCNC: 30 G/DL (ref 32–36)
MCV RBC AUTO: 110 FL (ref 82–98)
MONOCYTES # BLD AUTO: 0.5 K/UL (ref 0.3–1)
MONOCYTES NFR BLD: 8.8 % (ref 4–15)
NEUTROPHILS # BLD AUTO: 2.1 K/UL (ref 1.8–7.7)
NEUTROPHILS NFR BLD: 35.9 % (ref 38–73)
NRBC BLD-RTO: 0 /100 WBC
PLATELET # BLD AUTO: 183 K/UL (ref 150–350)
PMV BLD AUTO: 11.5 FL (ref 9.2–12.9)
POTASSIUM SERPL-SCNC: 5.2 MMOL/L (ref 3.5–5.1)
PROT SERPL-MCNC: 6.5 G/DL (ref 6–8.4)
RBC # BLD AUTO: 2.99 M/UL (ref 4–5.4)
SATURATED IRON: 16 % (ref 20–50)
SODIUM SERPL-SCNC: 141 MMOL/L (ref 136–145)
TOTAL IRON BINDING CAPACITY: 374 UG/DL (ref 250–450)
TRANSFERRIN SERPL-MCNC: 253 MG/DL (ref 200–375)
WBC # BLD AUTO: 5.9 K/UL (ref 3.9–12.7)

## 2020-08-24 PROCEDURE — 83540 ASSAY OF IRON: CPT | Mod: HCNC

## 2020-08-24 PROCEDURE — 83615 LACTATE (LD) (LDH) ENZYME: CPT | Mod: HCNC

## 2020-08-24 PROCEDURE — 85025 COMPLETE CBC W/AUTO DIFF WBC: CPT | Mod: HCNC

## 2020-08-24 PROCEDURE — 82728 ASSAY OF FERRITIN: CPT | Mod: HCNC

## 2020-08-24 PROCEDURE — 36415 COLL VENOUS BLD VENIPUNCTURE: CPT | Mod: HCNC,PO

## 2020-08-24 PROCEDURE — 80053 COMPREHEN METABOLIC PANEL: CPT | Mod: HCNC

## 2020-08-25 ENCOUNTER — LAB VISIT (OUTPATIENT)
Dept: LAB | Facility: HOSPITAL | Age: 80
End: 2020-08-25
Attending: FAMILY MEDICINE
Payer: MEDICARE

## 2020-08-25 ENCOUNTER — OFFICE VISIT (OUTPATIENT)
Dept: INTERNAL MEDICINE | Facility: CLINIC | Age: 80
End: 2020-08-25
Payer: MEDICARE

## 2020-08-25 VITALS
DIASTOLIC BLOOD PRESSURE: 70 MMHG | WEIGHT: 147.38 LBS | BODY MASS INDEX: 25.16 KG/M2 | SYSTOLIC BLOOD PRESSURE: 130 MMHG | HEIGHT: 64 IN | TEMPERATURE: 99 F | HEART RATE: 93 BPM | OXYGEN SATURATION: 96 %

## 2020-08-25 DIAGNOSIS — E03.9 HYPOTHYROIDISM (ACQUIRED): ICD-10-CM

## 2020-08-25 DIAGNOSIS — E78.2 MIXED HYPERLIPIDEMIA: ICD-10-CM

## 2020-08-25 DIAGNOSIS — E87.5 HYPERKALEMIA: ICD-10-CM

## 2020-08-25 DIAGNOSIS — Z00.00 ROUTINE GENERAL MEDICAL EXAMINATION AT A HEALTH CARE FACILITY: Primary | ICD-10-CM

## 2020-08-25 DIAGNOSIS — N18.30 STAGE 3 CHRONIC KIDNEY DISEASE: Chronic | ICD-10-CM

## 2020-08-25 DIAGNOSIS — M62.40 INVOLUNTARY MUSCLE CONTRACTIONS: ICD-10-CM

## 2020-08-25 DIAGNOSIS — I10 ESSENTIAL HYPERTENSION: ICD-10-CM

## 2020-08-25 LAB
CHOLEST SERPL-MCNC: 133 MG/DL (ref 120–199)
CHOLEST/HDLC SERPL: 2.4 {RATIO} (ref 2–5)
HDLC SERPL-MCNC: 55 MG/DL (ref 40–75)
HDLC SERPL: 41.4 % (ref 20–50)
LDLC SERPL CALC-MCNC: 59 MG/DL (ref 63–159)
NONHDLC SERPL-MCNC: 78 MG/DL
POTASSIUM SERPL-SCNC: 5.7 MMOL/L (ref 3.5–5.1)
T4 FREE SERPL-MCNC: 0.72 NG/DL (ref 0.71–1.51)
TRIGL SERPL-MCNC: 95 MG/DL (ref 30–150)
TSH SERPL DL<=0.005 MIU/L-ACNC: 3.93 UIU/ML (ref 0.4–4)

## 2020-08-25 PROCEDURE — 3075F PR MOST RECENT SYSTOLIC BLOOD PRESS GE 130-139MM HG: ICD-10-PCS | Mod: HCNC,CPTII,S$GLB, | Performed by: FAMILY MEDICINE

## 2020-08-25 PROCEDURE — 84132 ASSAY OF SERUM POTASSIUM: CPT | Mod: HCNC

## 2020-08-25 PROCEDURE — 84439 ASSAY OF FREE THYROXINE: CPT | Mod: HCNC

## 2020-08-25 PROCEDURE — 36415 COLL VENOUS BLD VENIPUNCTURE: CPT | Mod: HCNC

## 2020-08-25 PROCEDURE — 3075F SYST BP GE 130 - 139MM HG: CPT | Mod: HCNC,CPTII,S$GLB, | Performed by: FAMILY MEDICINE

## 2020-08-25 PROCEDURE — 84443 ASSAY THYROID STIM HORMONE: CPT | Mod: HCNC

## 2020-08-25 PROCEDURE — 80061 LIPID PANEL: CPT | Mod: HCNC

## 2020-08-25 PROCEDURE — 3078F PR MOST RECENT DIASTOLIC BLOOD PRESSURE < 80 MM HG: ICD-10-PCS | Mod: HCNC,CPTII,S$GLB, | Performed by: FAMILY MEDICINE

## 2020-08-25 PROCEDURE — 99999 PR PBB SHADOW E&M-EST. PATIENT-LVL IV: ICD-10-PCS | Mod: PBBFAC,HCNC,, | Performed by: FAMILY MEDICINE

## 2020-08-25 PROCEDURE — 3078F DIAST BP <80 MM HG: CPT | Mod: HCNC,CPTII,S$GLB, | Performed by: FAMILY MEDICINE

## 2020-08-25 PROCEDURE — 99999 PR PBB SHADOW E&M-EST. PATIENT-LVL IV: CPT | Mod: PBBFAC,HCNC,, | Performed by: FAMILY MEDICINE

## 2020-08-25 PROCEDURE — 99397 PER PM REEVAL EST PAT 65+ YR: CPT | Mod: HCNC,S$GLB,, | Performed by: FAMILY MEDICINE

## 2020-08-25 PROCEDURE — 99397 PR PREVENTIVE VISIT,EST,65 & OVER: ICD-10-PCS | Mod: HCNC,S$GLB,, | Performed by: FAMILY MEDICINE

## 2020-08-25 NOTE — PROGRESS NOTES
Subjective:       Patient ID: Violet Swenson is a 80 y.o. female.    Chief Complaint: Annual Exam    Patient presents to clinic today for annual physical exam.    Patient reports issues with her arms and legs jerking, which results in her falling. She reports 3 episodes of this over the last month. She reports at times she will be laying in bed and start jerking. She describes her muscles jerking and she cannot control it, mainly in her legs, she reports symptoms in her arms. She reports her  has been woken up due to this, but it doesn't wake her up. She reports chronic back pain, she was taking mobic, but this was stopped due to kidney issues. She has upcoming appointment with Dr. Rogers.Ken.      Review of Systems   Constitutional: Negative for activity change and unexpected weight change.   HENT: Negative for hearing loss, rhinorrhea and trouble swallowing.    Eyes: Negative for discharge and visual disturbance.   Respiratory: Negative for chest tightness and wheezing.    Cardiovascular: Negative for chest pain and palpitations.   Gastrointestinal: Negative for blood in stool, constipation, diarrhea and vomiting.   Endocrine: Negative for polydipsia and polyuria.   Genitourinary: Negative for difficulty urinating, dysuria, hematuria and menstrual problem.   Musculoskeletal: Positive for arthralgias. Negative for joint swelling and neck pain.   Neurological: Positive for weakness and headaches.   Psychiatric/Behavioral: Positive for dysphoric mood. Negative for confusion.         Objective:      Physical Exam  Vitals signs reviewed.   Constitutional:       General: She is not in acute distress.     Appearance: Normal appearance. She is well-developed.   HENT:      Head: Normocephalic and atraumatic.      Right Ear: Tympanic membrane, ear canal and external ear normal.      Left Ear: Tympanic membrane, ear canal and external ear normal.      Nose: Nose normal. No mucosal edema or  rhinorrhea.      Mouth/Throat:      Pharynx: Uvula midline.   Eyes:      General: Lids are normal. No scleral icterus.     Extraocular Movements: Extraocular movements intact.      Conjunctiva/sclera: Conjunctivae normal.      Pupils: Pupils are equal, round, and reactive to light.   Neck:      Musculoskeletal: Normal range of motion and neck supple.      Thyroid: No thyromegaly.   Cardiovascular:      Rate and Rhythm: Normal rate and regular rhythm.      Heart sounds: No murmur. No friction rub. No gallop.    Pulmonary:      Effort: Pulmonary effort is normal.      Breath sounds: Normal breath sounds. No wheezing, rhonchi or rales.   Abdominal:      General: Bowel sounds are normal. There is no distension.      Palpations: Abdomen is soft. There is no mass.      Tenderness: There is no abdominal tenderness.   Musculoskeletal: Normal range of motion.   Lymphadenopathy:      Cervical: No cervical adenopathy.   Skin:     General: Skin is warm and dry.      Findings: No lesion or rash.      Nails: There is no clubbing.     Neurological:      Mental Status: She is alert and oriented to person, place, and time.      Cranial Nerves: No cranial nerve deficit.      Sensory: No sensory deficit.      Gait: Gait normal.   Psychiatric:         Mood and Affect: Mood and affect normal.         Assessment:       1. Routine general medical examination at a health care facility    2. Essential hypertension    3. Mixed hyperlipidemia    4. Hypothyroidism (acquired)    5. Hyperkalemia    6. Involuntary muscle contractions    7. Stage 3 chronic kidney disease        Plan:     Problem List Items Addressed This Visit     Essential hypertension (Chronic)    Current Assessment & Plan     Controlled, continue current medications         Hyperkalemia    Current Assessment & Plan     Noted on recent labs, will repeat today         Relevant Orders    Potassium (Completed)    Hypothyroidism (acquired) (Chronic)    Relevant Orders    TSH  (Completed)    T4, free (Completed)    Involuntary muscle contractions    Relevant Orders    Ambulatory referral/consult to Neurology    Mixed hyperlipidemia (Chronic)    Current Assessment & Plan     Status pending labs, continue pravastatin         Relevant Orders    Lipid Panel (Completed)    Stage 3 chronic kidney disease (Chronic)    Overview     Followed by nephrology           Other Visit Diagnoses     Routine general medical examination at a health care facility    -  Primary          Health Maintenance reviewed/updated.  Advised to obtain flu vaccine this Fall.

## 2020-08-26 ENCOUNTER — OFFICE VISIT (OUTPATIENT)
Dept: HEMATOLOGY/ONCOLOGY | Facility: CLINIC | Age: 80
End: 2020-08-26
Payer: MEDICARE

## 2020-08-26 VITALS
WEIGHT: 148.56 LBS | BODY MASS INDEX: 25.36 KG/M2 | HEART RATE: 89 BPM | OXYGEN SATURATION: 97 % | DIASTOLIC BLOOD PRESSURE: 73 MMHG | HEIGHT: 64 IN | TEMPERATURE: 98 F | SYSTOLIC BLOOD PRESSURE: 120 MMHG

## 2020-08-26 DIAGNOSIS — C85.83 LARGE CELL LYMPHOMA OF INTRA-ABDOMINAL LYMPH NODES: Primary | ICD-10-CM

## 2020-08-26 PROCEDURE — 1126F PR PAIN SEVERITY QUANTIFIED, NO PAIN PRESENT: ICD-10-PCS | Mod: HCNC,S$GLB,, | Performed by: INTERNAL MEDICINE

## 2020-08-26 PROCEDURE — 99999 PR PBB SHADOW E&M-EST. PATIENT-LVL III: CPT | Mod: PBBFAC,HCNC,, | Performed by: INTERNAL MEDICINE

## 2020-08-26 PROCEDURE — 1101F PT FALLS ASSESS-DOCD LE1/YR: CPT | Mod: HCNC,CPTII,S$GLB, | Performed by: INTERNAL MEDICINE

## 2020-08-26 PROCEDURE — 99499 UNLISTED E&M SERVICE: CPT | Mod: HCNC,S$GLB,, | Performed by: INTERNAL MEDICINE

## 2020-08-26 PROCEDURE — 99499 RISK ADDL DX/OHS AUDIT: ICD-10-PCS | Mod: HCNC,S$GLB,, | Performed by: INTERNAL MEDICINE

## 2020-08-26 PROCEDURE — 1101F PR PT FALLS ASSESS DOC 0-1 FALLS W/OUT INJ PAST YR: ICD-10-PCS | Mod: HCNC,CPTII,S$GLB, | Performed by: INTERNAL MEDICINE

## 2020-08-26 PROCEDURE — 3078F PR MOST RECENT DIASTOLIC BLOOD PRESSURE < 80 MM HG: ICD-10-PCS | Mod: HCNC,CPTII,S$GLB, | Performed by: INTERNAL MEDICINE

## 2020-08-26 PROCEDURE — 99999 PR PBB SHADOW E&M-EST. PATIENT-LVL III: ICD-10-PCS | Mod: PBBFAC,HCNC,, | Performed by: INTERNAL MEDICINE

## 2020-08-26 PROCEDURE — 99214 OFFICE O/P EST MOD 30 MIN: CPT | Mod: HCNC,S$GLB,, | Performed by: INTERNAL MEDICINE

## 2020-08-26 PROCEDURE — 3074F SYST BP LT 130 MM HG: CPT | Mod: HCNC,CPTII,S$GLB, | Performed by: INTERNAL MEDICINE

## 2020-08-26 PROCEDURE — 1126F AMNT PAIN NOTED NONE PRSNT: CPT | Mod: HCNC,S$GLB,, | Performed by: INTERNAL MEDICINE

## 2020-08-26 PROCEDURE — 1159F PR MEDICATION LIST DOCUMENTED IN MEDICAL RECORD: ICD-10-PCS | Mod: HCNC,S$GLB,, | Performed by: INTERNAL MEDICINE

## 2020-08-26 PROCEDURE — 1159F MED LIST DOCD IN RCRD: CPT | Mod: HCNC,S$GLB,, | Performed by: INTERNAL MEDICINE

## 2020-08-26 PROCEDURE — 99214 PR OFFICE/OUTPT VISIT, EST, LEVL IV, 30-39 MIN: ICD-10-PCS | Mod: HCNC,S$GLB,, | Performed by: INTERNAL MEDICINE

## 2020-08-26 PROCEDURE — 3078F DIAST BP <80 MM HG: CPT | Mod: HCNC,CPTII,S$GLB, | Performed by: INTERNAL MEDICINE

## 2020-08-26 PROCEDURE — 3074F PR MOST RECENT SYSTOLIC BLOOD PRESSURE < 130 MM HG: ICD-10-PCS | Mod: HCNC,CPTII,S$GLB, | Performed by: INTERNAL MEDICINE

## 2020-08-26 NOTE — PROGRESS NOTES
Subjective:       Patient ID: Violet Swenson is a 80 y.o. female.    Chief Complaint: No chief complaint on file.    HPI  This 80 year old  lady  comes for follow up of her  diffuse large cell lymphoma.   A CT of   the abdomen done on 04/05/2017, done because of abdominal pain ,  was reported as showing a 6.9 x 7.0 x 8.4 cm soft  tissue mass in the right lower quadrant in the terminal ileum abutting the   cecum. There was adjacent conglomerate adenopathy in the right lower quadrant   mesentery.     The patient had a colonoscopy that showed a lesion in the terminal ileum.     She underwent a right hemicolectomy and terminal ileum removal. 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  She had a Ct/PET that showed evidence of surgery and some non specific findings, but no evidence of active disease elsewhere.  An ECHO showed normal cardiac ejection fraction, as well as a MUGA.  She was seen cardiology and cleared for ADRIAMYCIN administration.  She was negative for Hep B/C and HIV  Bone marrow was negative.     The patient started  R-CHOP. Treatment  She tolerated the treatment with some minor difficulties. After 3 cycles, she had a repeat CT/PET  There was no activity in the abdomen.  There was development of ground glass opacities/infiltrates in both lungs which were hypermetabolic although the SUV was not reported.  We discussed the imaging studies with Radiology and Pulmonary.  Dr Corral of the Pulmonary Department favored the findings to be due to pulmonary congestion.  Her troponin was  normal, but her BNP was markedly elevated at 1,103.  She was started on Lasix by dr Corral and asked to have a  Ct in few weeks  The patient   had evaluation by Cardiology ( dr Gil).It was felt had developed CHF., with a decrease in her ejection traction to 47%., elevated BNP and imaging studies.  The patient indicated her leg  edema and SOB   improved on lasix. Repeat  PET showed clearance of all the infiltrates  Since, she has had a complete work including cardiac catheterization with negative findings.  A She comes for follow up. She had a CT/PET in 2018 that shows no evidence of recurrence. Repeat CT/PET 2018  A 2019 , and 2029 showed also no evidence of recurrence     Follwing her chemotherapy she has had a macrocytic anemia with normal b12/folate levels.      Says she has neen experiencing some issues when her muscles seem to start shaking and she falls slowly to the ground. She ahs taken her BP at those times and her readinga are in the low 80'2.  Nephrology stopped her colchicine, allopurinol, lasix and meloxicam  ALLERGIES:see med card     MEDICATIONS: See MedCard.     PREVIOUS SURGERIES: Appendectomy in , tonsillectomy at age 19, gastric   bypass with incidental cholecystectomy, bilateral knee replacement in .     SOCIAL HISTORY: She is . She had two natural children, and one adopted   one. The adopted child has . She lives in [place] with her   . She smoked for two years, averaging a pack a day. She stopped 35   years ago or so. Denies any alcohol intake. She worked in [employer].     FAMILY HISTORY: Father  of colon cancer. Younger brother had leukemia that  transform into a lymphoma. Sister had pancreatic cancer          Review of Systems   Constitutional: Negative.    HENT: Negative.    Eyes: Negative.    Respiratory: Negative.  Negative for cough and wheezing.    Cardiovascular: Negative.  Negative for chest pain.   Gastrointestinal: Negative.    Genitourinary: Negative.    Neurological: Negative.    Psychiatric/Behavioral: Negative.          Objective:      Physical Exam  Constitutional:       Appearance: She is well-developed.   HENT:      Head: Normocephalic.   Eyes:      Conjunctiva/sclera: Conjunctivae normal.      Pupils: Pupils are equal, round, and reactive to light.   Neck:      Musculoskeletal: Neck supple.       Thyroid: No thyromegaly.   Cardiovascular:      Rate and Rhythm: Normal rate.      Comments: S4 present  Pulmonary:      Effort: No respiratory distress.      Breath sounds: No wheezing or rales.   Chest:      Chest wall: No tenderness.   Abdominal:      General: There is no distension.      Palpations: There is no mass.      Tenderness: There is no abdominal tenderness. There is no guarding or rebound.   Musculoskeletal:         General: No tenderness.   Lymphadenopathy:      Cervical: No cervical adenopathy.   Skin:     Coloration: Skin is not pale.      Findings: No erythema or rash.   Neurological:      Mental Status: She is alert and oriented to person, place, and time.      Cranial Nerves: No cranial nerve deficit.         Wt Readings from Last 3 Encounters:   08/26/20 67.4 kg (148 lb 9.4 oz)   08/25/20 66.8 kg (147 lb 6 oz)   08/10/20 64.6 kg (142 lb 6.7 oz)     Temp Readings from Last 3 Encounters:   08/26/20 97.6 °F (36.4 °C) (Oral)   08/25/20 98.6 °F (37 °C)   05/29/20 98.2 °F (36.8 °C) (Oral)     BP Readings from Last 3 Encounters:   08/26/20 120/73   08/25/20 130/70   08/10/20 (!) 84/64     Pulse Readings from Last 3 Encounters:   08/26/20 89   08/25/20 93   08/10/20 88     Lab Results   Component Value Date    WBC 5.90 08/24/2020    HGB 9.9 (L) 08/24/2020    HCT 33.0 (L) 08/24/2020     (H) 08/24/2020     08/24/2020       Lab Results   Component Value Date    CREATININE 1.1 08/24/2020     Lab Results   Component Value Date    ALT 23 08/24/2020    AST 32 08/24/2020    ALKPHOS 65 08/24/2020    BILITOT 0.2 08/24/2020     Lab Results   Component Value Date    IRON 61 08/24/2020    TIBC 374 08/24/2020    FERRITIN 95 08/24/2020         Assessment:       1. 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 There is no evidence of recurrent lymphoma  She ahs been asked to monitor bP readings  See us back in months with a cbc/cmp/ferritin and  tibc

## 2020-08-27 ENCOUNTER — TELEPHONE (OUTPATIENT)
Dept: NEPHROLOGY | Facility: CLINIC | Age: 80
End: 2020-08-27

## 2020-08-27 ENCOUNTER — TELEPHONE (OUTPATIENT)
Dept: INTERNAL MEDICINE | Facility: CLINIC | Age: 80
End: 2020-08-27

## 2020-08-27 DIAGNOSIS — E87.5 HYPERKALEMIA: ICD-10-CM

## 2020-08-27 PROBLEM — M62.40 INVOLUNTARY MUSCLE CONTRACTIONS: Status: ACTIVE | Noted: 2020-08-27

## 2020-08-27 NOTE — TELEPHONE ENCOUNTER
L/m about the kayexelate to increase to t.i.d. if she is not taking that way already.. and to call with any questions.8/27/2020/0936/sf

## 2020-08-27 NOTE — TELEPHONE ENCOUNTER
----- Message from Robert Gomez MD sent at 8/26/2020  5:14 PM CDT -----  Increase kayexalate 3 times / week   ----- Message -----  From: Marti Coronel MD  Sent: 8/26/2020   1:23 PM CDT  To: Robert Gomez MD, #    Labs acceptable, except for elevated potassium.     Dr. Gomez, what do you advise? Thank you!

## 2020-08-27 NOTE — TELEPHONE ENCOUNTER
Please contact patient. Dr. Gomez recommends increasing her kayexalate to three times weekly. How often is she taking it currently? Chart shows twice daily. Thanks

## 2020-09-01 ENCOUNTER — LAB VISIT (OUTPATIENT)
Dept: LAB | Facility: HOSPITAL | Age: 80
End: 2020-09-01
Attending: INTERNAL MEDICINE
Payer: MEDICARE

## 2020-09-01 DIAGNOSIS — N17.9 AKI (ACUTE KIDNEY INJURY): ICD-10-CM

## 2020-09-01 DIAGNOSIS — E87.20 ACIDOSIS: ICD-10-CM

## 2020-09-01 DIAGNOSIS — N27.0 SMALL KIDNEY, UNILATERAL: ICD-10-CM

## 2020-09-01 DIAGNOSIS — N18.31 CHRONIC KIDNEY DISEASE (CKD) STAGE G3A/A1, MODERATELY DECREASED GLOMERULAR FILTRATION RATE (GFR) BETWEEN 45-59 ML/MIN/1.73 SQUARE METER AND ALBUMINURIA CREATININE RATIO LESS THAN 30 MG/G: ICD-10-CM

## 2020-09-01 LAB
ALBUMIN SERPL BCP-MCNC: 3.5 G/DL (ref 3.5–5.2)
ANION GAP SERPL CALC-SCNC: 7 MMOL/L (ref 8–16)
BASOPHILS # BLD AUTO: 0.04 K/UL (ref 0–0.2)
BASOPHILS NFR BLD: 0.6 % (ref 0–1.9)
BUN SERPL-MCNC: 19 MG/DL (ref 8–23)
CALCIUM SERPL-MCNC: 9.1 MG/DL (ref 8.7–10.5)
CHLORIDE SERPL-SCNC: 111 MMOL/L (ref 95–110)
CO2 SERPL-SCNC: 26 MMOL/L (ref 23–29)
CREAT SERPL-MCNC: 1.3 MG/DL (ref 0.5–1.4)
DIFFERENTIAL METHOD: ABNORMAL
EOSINOPHIL # BLD AUTO: 0.2 K/UL (ref 0–0.5)
EOSINOPHIL NFR BLD: 3.3 % (ref 0–8)
ERYTHROCYTE [DISTWIDTH] IN BLOOD BY AUTOMATED COUNT: 15.4 % (ref 11.5–14.5)
EST. GFR  (AFRICAN AMERICAN): 45 ML/MIN/1.73 M^2
EST. GFR  (NON AFRICAN AMERICAN): 39 ML/MIN/1.73 M^2
GLUCOSE SERPL-MCNC: 86 MG/DL (ref 70–110)
HCT VFR BLD AUTO: 32.2 % (ref 37–48.5)
HGB BLD-MCNC: 10.1 G/DL (ref 12–16)
IMM GRANULOCYTES # BLD AUTO: 0.02 K/UL (ref 0–0.04)
IMM GRANULOCYTES NFR BLD AUTO: 0.3 % (ref 0–0.5)
LYMPHOCYTES # BLD AUTO: 3 K/UL (ref 1–4.8)
LYMPHOCYTES NFR BLD: 47 % (ref 18–48)
MCH RBC QN AUTO: 33.3 PG (ref 27–31)
MCHC RBC AUTO-ENTMCNC: 31.4 G/DL (ref 32–36)
MCV RBC AUTO: 106 FL (ref 82–98)
MONOCYTES # BLD AUTO: 0.5 K/UL (ref 0.3–1)
MONOCYTES NFR BLD: 7.6 % (ref 4–15)
NEUTROPHILS # BLD AUTO: 2.6 K/UL (ref 1.8–7.7)
NEUTROPHILS NFR BLD: 41.2 % (ref 38–73)
NRBC BLD-RTO: 0 /100 WBC
PHOSPHATE SERPL-MCNC: 4 MG/DL (ref 2.7–4.5)
PLATELET # BLD AUTO: 190 K/UL (ref 150–350)
PMV BLD AUTO: 9.6 FL (ref 9.2–12.9)
POTASSIUM SERPL-SCNC: 5.3 MMOL/L (ref 3.5–5.1)
PTH-INTACT SERPL-MCNC: 246.9 PG/ML (ref 9–77)
RBC # BLD AUTO: 3.03 M/UL (ref 4–5.4)
SODIUM SERPL-SCNC: 144 MMOL/L (ref 136–145)
WBC # BLD AUTO: 6.41 K/UL (ref 3.9–12.7)

## 2020-09-01 PROCEDURE — 85025 COMPLETE CBC W/AUTO DIFF WBC: CPT | Mod: HCNC

## 2020-09-01 PROCEDURE — 80069 RENAL FUNCTION PANEL: CPT | Mod: HCNC

## 2020-09-01 PROCEDURE — 36415 COLL VENOUS BLD VENIPUNCTURE: CPT | Mod: HCNC

## 2020-09-01 PROCEDURE — 83970 ASSAY OF PARATHORMONE: CPT | Mod: HCNC

## 2020-09-08 ENCOUNTER — IMMUNIZATION (OUTPATIENT)
Dept: PHARMACY | Facility: CLINIC | Age: 80
End: 2020-09-08
Payer: MEDICARE

## 2020-09-08 ENCOUNTER — PATIENT OUTREACH (OUTPATIENT)
Dept: ADMINISTRATIVE | Facility: OTHER | Age: 80
End: 2020-09-08

## 2020-09-08 ENCOUNTER — OFFICE VISIT (OUTPATIENT)
Dept: NEPHROLOGY | Facility: CLINIC | Age: 80
End: 2020-09-08
Payer: MEDICARE

## 2020-09-08 VITALS
WEIGHT: 145.06 LBS | HEART RATE: 80 BPM | HEIGHT: 64 IN | BODY MASS INDEX: 24.77 KG/M2 | DIASTOLIC BLOOD PRESSURE: 90 MMHG | SYSTOLIC BLOOD PRESSURE: 148 MMHG

## 2020-09-08 DIAGNOSIS — E55.9 VITAMIN D DEFICIENCY: ICD-10-CM

## 2020-09-08 DIAGNOSIS — M15.9 PRIMARY OSTEOARTHRITIS INVOLVING MULTIPLE JOINTS: ICD-10-CM

## 2020-09-08 DIAGNOSIS — E87.5 HYPERKALEMIA: ICD-10-CM

## 2020-09-08 DIAGNOSIS — N14.0 ANALGESIC NEPHROPATHY: ICD-10-CM

## 2020-09-08 DIAGNOSIS — E21.3 HPTH (HYPERPARATHYROIDISM): ICD-10-CM

## 2020-09-08 DIAGNOSIS — E87.20 ACIDOSIS: ICD-10-CM

## 2020-09-08 DIAGNOSIS — N27.0 SMALL KIDNEY, UNILATERAL: ICD-10-CM

## 2020-09-08 DIAGNOSIS — N17.9 AKI (ACUTE KIDNEY INJURY): Primary | ICD-10-CM

## 2020-09-08 DIAGNOSIS — N18.31 CHRONIC KIDNEY DISEASE (CKD) STAGE G3A/A1, MODERATELY DECREASED GLOMERULAR FILTRATION RATE (GFR) BETWEEN 45-59 ML/MIN/1.73 SQUARE METER AND ALBUMINURIA CREATININE RATIO LESS THAN 30 MG/G: ICD-10-CM

## 2020-09-08 DIAGNOSIS — N18.30 STAGE 3 CHRONIC KIDNEY DISEASE: ICD-10-CM

## 2020-09-08 PROCEDURE — 3077F SYST BP >= 140 MM HG: CPT | Mod: HCNC,CPTII,S$GLB, | Performed by: INTERNAL MEDICINE

## 2020-09-08 PROCEDURE — 1101F PR PT FALLS ASSESS DOC 0-1 FALLS W/OUT INJ PAST YR: ICD-10-PCS | Mod: HCNC,CPTII,S$GLB, | Performed by: INTERNAL MEDICINE

## 2020-09-08 PROCEDURE — 1126F AMNT PAIN NOTED NONE PRSNT: CPT | Mod: HCNC,S$GLB,, | Performed by: INTERNAL MEDICINE

## 2020-09-08 PROCEDURE — 99999 PR PBB SHADOW E&M-EST. PATIENT-LVL V: ICD-10-PCS | Mod: PBBFAC,HCNC,, | Performed by: INTERNAL MEDICINE

## 2020-09-08 PROCEDURE — 1159F PR MEDICATION LIST DOCUMENTED IN MEDICAL RECORD: ICD-10-PCS | Mod: HCNC,S$GLB,, | Performed by: INTERNAL MEDICINE

## 2020-09-08 PROCEDURE — 3077F PR MOST RECENT SYSTOLIC BLOOD PRESSURE >= 140 MM HG: ICD-10-PCS | Mod: HCNC,CPTII,S$GLB, | Performed by: INTERNAL MEDICINE

## 2020-09-08 PROCEDURE — 3080F PR MOST RECENT DIASTOLIC BLOOD PRESSURE >= 90 MM HG: ICD-10-PCS | Mod: HCNC,CPTII,S$GLB, | Performed by: INTERNAL MEDICINE

## 2020-09-08 PROCEDURE — 1101F PT FALLS ASSESS-DOCD LE1/YR: CPT | Mod: HCNC,CPTII,S$GLB, | Performed by: INTERNAL MEDICINE

## 2020-09-08 PROCEDURE — 99499 UNLISTED E&M SERVICE: CPT | Mod: HCNC,S$GLB,, | Performed by: INTERNAL MEDICINE

## 2020-09-08 PROCEDURE — 99354 PR PROLONGED SVC, OUPT, 1ST HR: ICD-10-PCS | Mod: HCNC,S$GLB,, | Performed by: INTERNAL MEDICINE

## 2020-09-08 PROCEDURE — 99214 OFFICE O/P EST MOD 30 MIN: CPT | Mod: HCNC,S$GLB,, | Performed by: INTERNAL MEDICINE

## 2020-09-08 PROCEDURE — 3080F DIAST BP >= 90 MM HG: CPT | Mod: HCNC,CPTII,S$GLB, | Performed by: INTERNAL MEDICINE

## 2020-09-08 PROCEDURE — 1159F MED LIST DOCD IN RCRD: CPT | Mod: HCNC,S$GLB,, | Performed by: INTERNAL MEDICINE

## 2020-09-08 PROCEDURE — 99999 PR PBB SHADOW E&M-EST. PATIENT-LVL V: CPT | Mod: PBBFAC,HCNC,, | Performed by: INTERNAL MEDICINE

## 2020-09-08 PROCEDURE — 99214 PR OFFICE/OUTPT VISIT, EST, LEVL IV, 30-39 MIN: ICD-10-PCS | Mod: HCNC,S$GLB,, | Performed by: INTERNAL MEDICINE

## 2020-09-08 PROCEDURE — 1126F PR PAIN SEVERITY QUANTIFIED, NO PAIN PRESENT: ICD-10-PCS | Mod: HCNC,S$GLB,, | Performed by: INTERNAL MEDICINE

## 2020-09-08 PROCEDURE — 99354 PR PROLONGED SVC, OUPT, 1ST HR: CPT | Mod: HCNC,S$GLB,, | Performed by: INTERNAL MEDICINE

## 2020-09-08 PROCEDURE — 99499 RISK ADDL DX/OHS AUDIT: ICD-10-PCS | Mod: HCNC,S$GLB,, | Performed by: INTERNAL MEDICINE

## 2020-09-08 RX ORDER — TRAMADOL HYDROCHLORIDE 50 MG/1
50 TABLET ORAL EVERY 12 HOURS PRN
Qty: 60 TABLET | Refills: 2 | Status: SHIPPED | OUTPATIENT
Start: 2020-09-08 | End: 2021-11-05

## 2020-09-08 RX ORDER — HYDROCODONE BITARTRATE AND ACETAMINOPHEN 10; 325 MG/1; MG/1
1 TABLET ORAL EVERY 6 HOURS PRN
Qty: 120 TABLET | Refills: 0 | Status: SHIPPED | OUTPATIENT
Start: 2020-09-08 | End: 2021-11-05

## 2020-09-08 RX ORDER — DULOXETIN HYDROCHLORIDE 30 MG/1
30 CAPSULE, DELAYED RELEASE ORAL DAILY
Qty: 30 CAPSULE | Refills: 11 | Status: SHIPPED | OUTPATIENT
Start: 2020-09-08 | End: 2021-03-16 | Stop reason: SDUPTHER

## 2020-09-08 NOTE — PROGRESS NOTES
Subjective:       Patient ID: Violet Swenson is a 80 y.o. White female who presents for follow-up evaluation of Acute Renal Failure and Chronic Kidney Disease    Hypertension  Pertinent negatives include no chest pain, headaches, palpitations or shortness of breath.        Patient is a 80-year-old female with history of hypertension and lymphoma.  Patient had chemotherapy 2017 resulting in congestive heart failure and cardiomyopathy.  Patient has had rise in LFTs.patient had been taking Motrin which 15 mg but was seen by Dr. PANIAGUA in rheumatology who reduced the Mobic down to 7.5 mg.  Patient has been seen by Dr. Morales in hepatology for increased LFTs.    February 2018 patient evaluated for rising creatinine.  Workup ordered.  Noted left sided atrophic kidney and lobulated kidney on the right side which is enlarged based on the CT scan from 2017 09/2018 s/p falls x 3 in last few weeks ; renal function stable     2/2020 severe dizziness consult audiology       August 2020 creatinine is up to 1.9 with acute kidney injury.  Recent hyperkalemia noted.  All oncology, Rheumatology and cardiology records reviewed. Weight is down by 5   Lb.  Stop Mobic.  Stop Lasix.  Follow-up in a 3-4 weeks  stop colchicine. Stop allopurinol     September 2020 patient comes back for follow-up.  Acute kidney injury has improved off Mobic and off allopurinol.  Creatinine down to 1.3.    Review of Systems   Constitutional: Negative for activity change, appetite change, chills, diaphoresis, fatigue, fever and unexpected weight change.   HENT: Negative for congestion, dental problem, drooling, postnasal drip, rhinorrhea and voice change.    Eyes: Negative for discharge.   Respiratory: Negative for apnea, cough, choking, chest tightness, shortness of breath, wheezing and stridor.    Cardiovascular: Negative for chest pain, palpitations and leg swelling.   Gastrointestinal: Negative for abdominal distention, blood in stool,  "constipation, diarrhea, nausea, rectal pain and vomiting.   Endocrine: Negative for cold intolerance, heat intolerance, polydipsia and polyuria.   Genitourinary: Negative for decreased urine volume, difficulty urinating, dysuria, enuresis, flank pain, frequency, hematuria and urgency.   Musculoskeletal: Positive for arthralgias, joint swelling and myalgias. Negative for back pain and gait problem.   Skin: Negative for rash.   Allergic/Immunologic: Negative for food allergies and immunocompromised state.   Neurological: Negative for dizziness, tremors, syncope, numbness and headaches.   Hematological: Does not bruise/bleed easily.   Psychiatric/Behavioral: Negative for agitation, behavioral problems and self-injury. The patient is not nervous/anxious and is not hyperactive.    All other systems reviewed and are negative.      Objective:   BP (!) 148/90   Pulse 80   Ht 5' 4" (1.626 m)   Wt 65.8 kg (145 lb 1 oz)   BMI 24.90 kg/m²      Physical Exam  Vitals signs and nursing note reviewed.   Constitutional:       General: She is not in acute distress.     Appearance: She is not diaphoretic.   HENT:      Head: Normocephalic and atraumatic.      Nose: Nose normal.   Eyes:      Conjunctiva/sclera: Conjunctivae normal.      Pupils: Pupils are equal, round, and reactive to light.   Neck:      Musculoskeletal: Normal range of motion.      Thyroid: No thyromegaly.      Vascular: No JVD.      Trachea: No tracheal deviation.   Cardiovascular:      Rate and Rhythm: Normal rate and regular rhythm.      Heart sounds: Normal heart sounds. No murmur. No friction rub. No gallop.    Pulmonary:      Effort: Pulmonary effort is normal. No respiratory distress.      Breath sounds: Normal breath sounds. No wheezing or rales.   Chest:      Chest wall: No tenderness.   Abdominal:      General: Bowel sounds are normal. There is no distension.      Palpations: Abdomen is soft. There is no mass.      Tenderness: There is no abdominal " tenderness.      Hernia: No hernia is present.   Musculoskeletal: Normal range of motion.         General: No tenderness or deformity.      Comments: Walks with walker    Skin:     General: Skin is warm.      Coloration: Skin is pale.      Findings: No erythema.   Neurological:      Mental Status: She is alert and oriented to person, place, and time.      Cranial Nerves: No cranial nerve deficit.      Motor: No abnormal muscle tone.      Coordination: Coordination normal.      Deep Tendon Reflexes: Reflexes are normal and symmetric. Reflexes normal.   Psychiatric:         Behavior: Behavior normal.         Thought Content: Thought content normal.         Judgment: Judgment normal.           Lab Results   Component Value Date    CREATININE 1.3 09/01/2020    BUN 19 09/01/2020     09/01/2020    K 5.3 (H) 09/01/2020     (H) 09/01/2020    CO2 26 09/01/2020     Lab Results   Component Value Date    WBC 6.41 09/01/2020    HGB 10.1 (L) 09/01/2020    HCT 32.2 (L) 09/01/2020     (H) 09/01/2020     09/01/2020     Lab Results   Component Value Date    .9 (H) 09/01/2020    CALCIUM 9.1 09/01/2020    PHOS 4.0 09/01/2020        Lab Results   Component Value Date    URICACID 6.2 (H) 08/20/2018       Assessment:    )    1. NATHALIE (acute kidney injury)    2. Chronic kidney disease (CKD) stage G3a/A1, moderately decreased glomerular filtration rate (GFR) between 45-59 mL/min/1.73 square meter and albuminuria creatinine ratio less than 30 mg/g    3. Analgesic nephropathy    4. Small kidney, unilateral    5. Acidosis    6. Hyperkalemia    7. HPTH (hyperparathyroidism)    8. Vitamin D deficiency    9. Stage 3 chronic kidney disease        Plan:        1.   Acute kidney injury on Chronic kidney disease stage III:;   Likely side effect of Mobic.  Much improved off Lasix colchicine and Mobic.      2.  Small size kidneys/atrophic kidney on the left side:no renal artery stenosis     3. Vit D def:+ sec HPT: on  weekly Vit D +  calcitriol 0.25 mcg MWF :  Calcium and phosphorus are staying stable    4. Acidosis: on bicarbonate     5. Hyperkalemia: due to acidosis; off cozaar;  DC mobic  Patient is on Kayexalate at this time 2 times / day. Apply for lokelma 5 G daily     6. Gout + arthritis : stop colchicine; restart allopurinol 300 mg . Stop Mobic ;  add tramadol 50 bid  And use norcoe only for breakthrough. Norco 10 and can give percoset if needed ;    7. Severe neuropathy pain on Neurontin( 300 in am 600 in pm)  with multiple other sites of neuropathy pain: add small dose cymbalta 30 mg and taper the gabapentin to as needed     8. Remeron for sleep is working     ? Palliative care options on follow up     Extensive discussion with the patient about severe neuropathy, arthritis and multiple medical problems causing poor quality of life.  Extended time required for therapeutic options of all the other medical problems prevention of acute kidney injury, management of chronic kidney disease, management of neuropathy with multiple arthritis discussed.  Total Extended time spent is 33 min.  Total time spent face-to-face with the patient is 58 min.  May consider palliative care options on follow-up as I have provided the patient details about palliative care        follow-up 3 - months

## 2020-09-08 NOTE — PATIENT INSTRUCTIONS
1.   Acute kidney injury on Chronic kidney disease stage III:;   Likely side effect of Mobic.  Much improved off Lasix colchicine and Mobic.      2.  Small size kidneys/atrophic kidney on the left side:no renal artery stenosis     3. Vit D def:+ sec HPT: on weekly Vit D +  calcitriol 0.25 mcg MWF :  Calcium and phosphorus are staying stable    4. Acidosis: on bicarbonate     5. Hyperkalemia: due to acidosis; off cozaar;  DC mobic  Patient is on Kayexalate at this time 2 times / day. Apply for lokelma 5 G daily     6. Gout + arthritis : stop colchicine; restart allopurinol 300 mg . Stop Mobic ;  add tramadol 50 bid  And use norcoe only for breakthrough. Norco 10 and can give percoset if needed ;    7. Severe neuropathy pain on Neurontin( 300 in am 600 in pm)  with multiple other sites of neuropathy pain: add small dose cymbalta 30 mg and taper the gabapentin to as needed     8. Remeron for sleep is working              follow-up 3 - months

## 2020-09-08 NOTE — PROGRESS NOTES
Health Maintenance Due   Topic Date Due    Influenza Vaccine (1) 08/01/2020     Updates were requested from care everywhere.  Chart was reviewed for overdue Proactive [facility] Encounters (VICKI) topics (CRS, Breast Cancer Screening, Eye exam)  Health Maintenance has been updated.  [facility] immunization registry triggered.  Immunizations were reconciled.

## 2020-09-14 ENCOUNTER — TELEPHONE (OUTPATIENT)
Dept: RHEUMATOLOGY | Facility: CLINIC | Age: 80
End: 2020-09-14

## 2020-09-14 ENCOUNTER — INFUSION (OUTPATIENT)
Dept: INFUSION THERAPY | Facility: HOSPITAL | Age: 80
End: 2020-09-14
Attending: INTERNAL MEDICINE
Payer: MEDICARE

## 2020-09-14 ENCOUNTER — OFFICE VISIT (OUTPATIENT)
Dept: RHEUMATOLOGY | Facility: CLINIC | Age: 80
End: 2020-09-14
Payer: MEDICARE

## 2020-09-14 ENCOUNTER — HOSPITAL ENCOUNTER (OUTPATIENT)
Dept: RADIOLOGY | Facility: HOSPITAL | Age: 80
Discharge: HOME | End: 2020-09-14
Attending: INTERNAL MEDICINE
Payer: MEDICARE

## 2020-09-14 VITALS
WEIGHT: 141.31 LBS | HEIGHT: 64 IN | BODY MASS INDEX: 24.13 KG/M2 | HEART RATE: 96 BPM | SYSTOLIC BLOOD PRESSURE: 120 MMHG | DIASTOLIC BLOOD PRESSURE: 76 MMHG

## 2020-09-14 VITALS
OXYGEN SATURATION: 97 % | SYSTOLIC BLOOD PRESSURE: 124 MMHG | DIASTOLIC BLOOD PRESSURE: 88 MMHG | TEMPERATURE: 98 F | HEART RATE: 84 BPM

## 2020-09-14 DIAGNOSIS — M54.50 CHRONIC MIDLINE LOW BACK PAIN WITHOUT SCIATICA: ICD-10-CM

## 2020-09-14 DIAGNOSIS — G89.29 CHRONIC MIDLINE LOW BACK PAIN WITHOUT SCIATICA: ICD-10-CM

## 2020-09-14 DIAGNOSIS — M15.9 PRIMARY OSTEOARTHRITIS INVOLVING MULTIPLE JOINTS: ICD-10-CM

## 2020-09-14 DIAGNOSIS — M81.0 AGE-RELATED OSTEOPOROSIS WITHOUT CURRENT PATHOLOGICAL FRACTURE: Primary | ICD-10-CM

## 2020-09-14 DIAGNOSIS — M1A.09X0 IDIOPATHIC CHRONIC GOUT OF MULTIPLE SITES WITHOUT TOPHUS: ICD-10-CM

## 2020-09-14 PROCEDURE — 99214 OFFICE O/P EST MOD 30 MIN: CPT | Mod: HCNC,S$GLB,, | Performed by: INTERNAL MEDICINE

## 2020-09-14 PROCEDURE — 1159F PR MEDICATION LIST DOCUMENTED IN MEDICAL RECORD: ICD-10-PCS | Mod: HCNC,S$GLB,, | Performed by: INTERNAL MEDICINE

## 2020-09-14 PROCEDURE — 3078F PR MOST RECENT DIASTOLIC BLOOD PRESSURE < 80 MM HG: ICD-10-PCS | Mod: HCNC,CPTII,S$GLB, | Performed by: INTERNAL MEDICINE

## 2020-09-14 PROCEDURE — 1125F PR PAIN SEVERITY QUANTIFIED, PAIN PRESENT: ICD-10-PCS | Mod: HCNC,S$GLB,, | Performed by: INTERNAL MEDICINE

## 2020-09-14 PROCEDURE — 99999 PR PBB SHADOW E&M-EST. PATIENT-LVL V: ICD-10-PCS | Mod: PBBFAC,HCNC,, | Performed by: INTERNAL MEDICINE

## 2020-09-14 PROCEDURE — 3078F DIAST BP <80 MM HG: CPT | Mod: HCNC,CPTII,S$GLB, | Performed by: INTERNAL MEDICINE

## 2020-09-14 PROCEDURE — 72100 X-RAY EXAM L-S SPINE 2/3 VWS: CPT | Mod: 26,HCNC,, | Performed by: RADIOLOGY

## 2020-09-14 PROCEDURE — 99214 PR OFFICE/OUTPT VISIT, EST, LEVL IV, 30-39 MIN: ICD-10-PCS | Mod: HCNC,S$GLB,, | Performed by: INTERNAL MEDICINE

## 2020-09-14 PROCEDURE — 1101F PR PT FALLS ASSESS DOC 0-1 FALLS W/OUT INJ PAST YR: ICD-10-PCS | Mod: HCNC,CPTII,S$GLB, | Performed by: INTERNAL MEDICINE

## 2020-09-14 PROCEDURE — 99999 PR PBB SHADOW E&M-EST. PATIENT-LVL V: CPT | Mod: PBBFAC,HCNC,, | Performed by: INTERNAL MEDICINE

## 2020-09-14 PROCEDURE — 72100 X-RAY EXAM L-S SPINE 2/3 VWS: CPT | Mod: TC,HCNC

## 2020-09-14 PROCEDURE — 3074F SYST BP LT 130 MM HG: CPT | Mod: HCNC,CPTII,S$GLB, | Performed by: INTERNAL MEDICINE

## 2020-09-14 PROCEDURE — 1101F PT FALLS ASSESS-DOCD LE1/YR: CPT | Mod: HCNC,CPTII,S$GLB, | Performed by: INTERNAL MEDICINE

## 2020-09-14 PROCEDURE — 72100 XR LUMBAR SPINE AP AND LATERAL: ICD-10-PCS | Mod: 26,HCNC,, | Performed by: RADIOLOGY

## 2020-09-14 PROCEDURE — 96372 THER/PROPH/DIAG INJ SC/IM: CPT | Mod: HCNC

## 2020-09-14 PROCEDURE — 1125F AMNT PAIN NOTED PAIN PRSNT: CPT | Mod: HCNC,S$GLB,, | Performed by: INTERNAL MEDICINE

## 2020-09-14 PROCEDURE — 1159F MED LIST DOCD IN RCRD: CPT | Mod: HCNC,S$GLB,, | Performed by: INTERNAL MEDICINE

## 2020-09-14 PROCEDURE — 3074F PR MOST RECENT SYSTOLIC BLOOD PRESSURE < 130 MM HG: ICD-10-PCS | Mod: HCNC,CPTII,S$GLB, | Performed by: INTERNAL MEDICINE

## 2020-09-14 PROCEDURE — 63600175 PHARM REV CODE 636 W HCPCS: Mod: JG,HCNC | Performed by: INTERNAL MEDICINE

## 2020-09-14 RX ADMIN — DENOSUMAB 60 MG: 60 INJECTION SUBCUTANEOUS at 12:09

## 2020-09-14 NOTE — PROGRESS NOTES
RHEUMATOLOGY CLINIC FOLLOW UP VISIT  Chief complaints:-  To follow up for osteoporosis. Back pain.     HPI:-  Violet Zhao a 80 y.o. pleasant female comes in for a follow up visit.She follows up in the Rheumatology Clinic for osteoporosis, gout and osteoarthritis.  She complains of worsening lower back pain- chronic, progressive, achy, intermittent, worse with activity, partially resolves with resting, associated with some weakness but no bladder or bowel incontinence.  She denies any flare-up of gout since last visit.  She is taking allopurinol without any problem.  She has chronic kidney disease associated with hyperparathyroidism and vitamin-D deficiency.  She is under care of nephrologist Dr. Gomez. She is in prolia for osteoporosis . No falls or fractures since last visit.        Review of Systems   Constitutional: Negative for chills and fever.   HENT: Negative for congestion and sore throat.    Eyes: Negative for blurred vision and redness.   Respiratory: Negative for cough and shortness of breath.    Cardiovascular: Negative for chest pain and leg swelling.   Gastrointestinal: Negative for abdominal pain.   Genitourinary: Negative for dysuria.   Musculoskeletal: Positive for back pain, joint pain, myalgias and neck pain. Negative for falls.   Skin: Negative for rash.   Neurological: Negative for headaches.   Endo/Heme/Allergies: Does not bruise/bleed easily.   Psychiatric/Behavioral: Negative for memory loss. The patient does not have insomnia.        Past Medical History:   Diagnosis Date    Age-related osteoporosis without current pathological fracture 8/20/2018    Anemia     Anxiety     Arthritis     Atrial flutter     Cancer     lymphoma Large cell B    CHF (congestive heart failure)     Chronic anemia 4/26/2017    Chronic midline low back pain with right-sided sciatica 8/20/2018    Coronary artery disease     01/2015 [facility]  patent LCX. 50% stenosis in LAD and RCA.      Depression     Disorder of kidney and ureter     Encounter for blood transfusion     GERD (gastroesophageal reflux disease)     Gout, arthritis     Heart failure     Hx of psychiatric care     Hyperlipidemia     Hypertension     Hypothyroidism     Immune deficiency disorder     Kidney disease     Lung nodule     RML--stable    Obesity     Pacemaker     Metronic    Paroxysmal atrial fibrillation 3/15/2018    Pneumonia     Polyneuropathy     chemo induced    Psychiatric problem     Tobacco dependence     quit     Trouble in sleeping        Past Surgical History:   Procedure Laterality Date    APPENDECTOMY  1966 approx    CARDIAC PACEMAKER PLACEMENT  2015    CHOLECYSTECTOMY  1993    incidental at time of gastric bypass    COLON SURGERY Right 2017    hemicolectomy    COLONOSCOPY N/A 2017    Procedure: COLONOSCOPY;  Surgeon: Tye Enamorado MD;  Location: [facility];  Service: Endoscopy;  Laterality: N/A;    COLONOSCOPY N/A 2018    Procedure: COLONOSCOPY;  Surgeon: Saúl Arthur III, MD;  Location: [facility];  Service: Endoscopy;  Laterality: N/A;    CORONARY ANGIOPLASTY  2014    CORONARY STENT PLACEMENT  2014    ESOPHAGOGASTRODUODENOSCOPY N/A 2018    Procedure: EGD (ESOPHAGOGASTRODUODENOSCOPY);  Surgeon: Saúl Arthur III, MD;  Location: [facility];  Service: Endoscopy;  Laterality: N/A;    GASTRIC BYPASS      with incidental choly    HERNIA REPAIR      JOINT REPLACEMENT Bilateral     3 months apart    TONSILLECTOMY          Social History     Tobacco Use    Smoking status: Former Smoker     Packs/day: 2.00     Years: 6.00     Pack years: 12.00     Quit date: 3/15/1976     Years since quittin.5    Smokeless tobacco: Never Used   Substance Use Topics    Alcohol use: No     Alcohol/week: 0.0 standard drinks    Drug use: No       Family History   Problem Relation Age of Onset     "Heart disease Mother     Hypertension Mother     Cataracts Mother     Stomach cancer Father         "ulcers that turned to cancer"    Cancer Father         stomach    Pancreatic cancer Sister     Cancer Sister         pancreatic    Leukemia Brother         "leukemia which led to intestinal cancer"    Cataracts Brother     Cancer Brother         leukemia then later stomach cancer    Drug abuse Son     Cancer Son         prostate cancer    Prostate cancer Son     COPD Daughter     Asthma Daughter     Hypertension Maternal Grandfather     Stroke Maternal Grandfather     Alcohol abuse Neg Hx     Diabetes Neg Hx     Mental retardation Neg Hx     Mental illness Neg Hx        Review of patient's allergies indicates:   Allergen Reactions    Corticosteroids (glucocorticoids) Nausea Only and Other (See Comments)     Stomach pain, dizziness, headache    Oxycodone Other (See Comments)     Blood pressure dropped       Vitals:    09/14/20 1213   BP: 120/76   Pulse: 96   Weight: 64.1 kg (141 lb 5 oz)   Height: 5' 4" (1.626 m)   PainSc:   6   PainLoc: Back       Physical Exam   Constitutional: She is oriented to person, place, and time and well-developed, well-nourished, and in no distress. No distress.   HENT:   Head: Normocephalic.   Mouth/Throat: Oropharynx is clear and moist.   Eyes: Pupils are equal, round, and reactive to light. Conjunctivae and EOM are normal.   Neck: Normal range of motion. Neck supple.   Cardiovascular: Normal rate and intact distal pulses.   Pulmonary/Chest: Effort normal. No respiratory distress.   Abdominal: Soft. There is no abdominal tenderness.   Musculoskeletal:      Comments: No synovitis over small joints of hands or feet.  No effusion over large joints.   Neurological: She is alert and oriented to person, place, and time. No cranial nerve deficit.   Skin: Skin is warm. No rash noted. No erythema.   Psychiatric: Mood and affect normal.   Nursing note and vitals " reviewed.      Medication List with Changes/Refills   Current Medications    ASCORBIC ACID, VITAMIN C, (VITAMIN C) 100 MG TABLET    Take by mouth once daily.    ASPIRIN (ECOTRIN) 81 MG EC TABLET    Take 81 mg by mouth nightly.     BUSPIRONE (BUSPAR) 7.5 MG TABLET    TAKE ONE TABLET BY MOUTH THREE TIMES DAILY    CALCITRIOL (ROCALTROL) 0.25 MCG CAP    TAKE ONE CAPSULE BY MOUTH EVERY MONDAY, WEDNESDAY AND FRIDAY    CLOPIDOGREL (PLAVIX) 75 MG TABLET    TAKE ONE TABLET BY MOUTH EVERY DAY    DICLOFENAC SODIUM 1 % GEL    Apply 2 g topically once daily. Apply 2 g over painful joints once or twice a day.    DULOXETINE (CYMBALTA) 30 MG CAPSULE    Take 1 capsule (30 mg total) by mouth once daily.    ERGOCALCIFEROL, VITAMIN D2, 400 UNIT TAB    Take by mouth.    FLU VAC 2020 [id number],PF, (FLUAD QUAD 2020-21,65Y UP,,PF,) 60 MCG (15 MCG X 4)/0.5 ML SYRG    Inject 0.5 mLs into the muscle.    FLUOCINOLONE (SYNALAR) 0.01 % EXTERNAL SOLUTION    AAA of scalp twice a day prn itching.    FLUTICASONE PROPIONATE (FLONASE) 50 MCG/ACTUATION NASAL SPRAY    2 sprays (100 mcg total) by Each Nare route once daily.    GABAPENTIN (NEURONTIN) 300 MG CAPSULE    TAKE ONE CAPSULE BY MOUTH THREE TIMES DAILY    HYDROCODONE-ACETAMINOPHEN (NORCO)  MG PER TABLET    Take 1 tablet by mouth every 6 (six) hours as needed for Pain.    IRON-VITAMIN C 100-250 MG, ICAR-C, 100-250 MG TAB    TAKE ONE TABLET BY MOUTH EVERY DAY    LEVOCETIRIZINE (XYZAL) 5 MG TABLET    Take 1 tablet (5 mg total) by mouth every evening.    LEVOTHYROXINE (SYNTHROID) 75 MCG TABLET    TAKE ONE TABLET BY MOUTH EVERY DAY BEFORE BREAKFAST    METOPROLOL TARTRATE (LOPRESSOR) 25 MG TABLET    TAKE ONE TABLET BY MOUTH TWICE DAILY    MIRTAZAPINE (REMERON SOL-TAB) 15 MG DISINTEGRATING TABLET    DISSOLVE ONE TABLET BY MOUTH NIGHTLY    MULTIVITAMIN (ONE DAILY MULTIVITAMIN) PER TABLET    Take 1 tablet by mouth once daily.    PRAVASTATIN (PRAVACHOL) 40 MG TABLET    TAKE ONE TABLET BY MOUTH  ONCE DAILY    RANITIDINE (ZANTAC) 150 MG CAPSULE    Take 150 mg by mouth nightly.     SERTRALINE (ZOLOFT) 100 MG TABLET    TAKE 1 & 1/2 TABLETS BY MOUTH EVERY DAY    SODIUM BICARBONATE 650 MG TABLET    TAKE ONE TABLET BY MOUTH TWICE DAILY    SODIUM ZIRCONIUM CYCLOSILICATE (LOKELMA) 5 GRAM PACKET    Take 1 packet (5 g total) by mouth once daily. Mix entire contents of packet(s) into drinking glass containing >3 tablespoons of water; stir well and drink immediately. If powder remains in the drinking glass, add water and repeat until no powder remains to ensure entire dose is taken.    TRAMADOL (ULTRAM) 50 MG TABLET    Take 1 tablet (50 mg total) by mouth every 12 (twelve) hours as needed for Pain.    TRETINOIN (RETIN-A) 0.025 % CREAM    Apply a pea-sized amount to the entire face at bedtime.  Use every third night and increase as tolerated to nightly.    TRIAMCINOLONE ACETONIDE 0.025% (KENALOG) 0.025 % CREAM    Apply topically 2 (two) times daily. PRN itching.    VITAMIN D2 1,250 MCG (50,000 UNIT) CAPSULE    Take 1 capsule (50,000 Units total) by mouth every 7 days.    ZINC ACETATE ORAL    Take 250 mg by mouth once daily.        Assessment/Plans:-  1. Age-related osteoporosis without current pathological fracture    2. Primary osteoarthritis involving multiple joints    3. Idiopathic chronic gout of multiple sites without tophus    4. Chronic midline low back pain without sciatica    - Stable osteoporosis on prolia. Continue prolia every 6 months. High risk for hypocalcemia. Advised all red flags to look for. Repeat CMP in 10 days.   - Worsening lumbago. Referral sent to PT and Pain clinic.   - Stable gout. Monitor.     Problem List Items Addressed This Visit     Idiopathic chronic gout of multiple sites without tophus (Chronic)    Primary osteoarthritis involving multiple joints (Chronic)    Age-related osteoporosis without current pathological fracture - Primary    Chronic midline low back pain without sciatica     Relevant Orders    X-Ray Lumbar Spine AP And Lateral (Completed)    Ambulatory referral/consult to Physical/Occupational Therapy    Ambulatory referral/consult to Pain Clinic        # Follow up in about 6 months (around 3/14/2021).      Disclaimer: This note was prepared using voice recognition system and is likely to have sound alike errors and is not proof read.  Please call me with any questions.

## 2020-09-14 NOTE — PATIENT INSTRUCTIONS
Denosumab injection  What is this medicine?  DENOSUMAB (den oh tania mab) slows bone breakdown. Prolia is used to treat osteoporosis in women after menopause and in men. Xgeva is used to prevent bone fractures and other bone problems caused by cancer bone metastases. Xgeva is also used to treat giant cell tumor of the bone.  How should I use this medicine?  This medicine is for injection under the skin. It is given by a health care professional in a hospital or clinic setting.  If you are getting Prolia, a special MedGuide will be given to you by the pharmacist with each prescription and refill. Be sure to read this information carefully each time.  For Prolia, talk to your pediatrician regarding the use of this medicine in children. Special care may be needed. For Xgeva, talk to your pediatrician regarding the use of this medicine in children. While this drug may be prescribed for children as young as 13 years for selected conditions, precautions do apply.  What side effects may I notice from receiving this medicine?  Side effects that you should report to your doctor or health care professional as soon as possible:  · allergic reactions like skin rash, itching or hives, swelling of the face, lips, or tongue  · breathing problems  · chest pain  · fast, irregular heartbeat  · feeling faint or lightheaded, falls  · fever, chills, or any other sign of infection  · muscle spasms, tightening, or twitches  · numbness or tingling  · skin blisters or bumps, or is dry, peels, or red  · slow healing or unexplained pain in the mouth or jaw  · unusual bleeding or bruising  Side effects that usually do not require medical attention (Report these to your doctor or health care professional if they continue or are bothersome.):  · muscle pain  · stomach upset, gas  What may interact with this medicine?  Do not take this medicine with any of the following medications:  · other medicines containing denosumab  This medicine may also  interact with the following medications:  · medicines that suppress the immune system  · medicines that treat cancer  · steroid medicines like prednisone or cortisone  What if I miss a dose?  It is important not to miss your dose. Call your doctor or health care professional if you are unable to keep an appointment.  Where should I keep my medicine?  This medicine is only given in a clinic, doctor's office, or other health care setting and will not be stored at home.  What should I tell my health care provider before I take this medicine?  They need to know if you have any of these conditions:  · dental disease  · eczema  · infection or history of infections  · kidney disease or on dialysis  · low blood calcium or vitamin D  · malabsorption syndrome  · scheduled to have surgery or tooth extraction  · taking medicine that contains denosumab  · thyroid or parathyroid disease  · an unusual reaction to denosumab, other medicines, foods, dyes, or preservatives  · pregnant or trying to get pregnant  · breast-feeding  What should I watch for while using this medicine?  Visit your doctor or health care professional for regular checks on your progress. Your doctor or health care professional may order blood tests and other tests to see how you are doing.  Call your doctor or health care professional if you get a cold or other infection while receiving this medicine. Do not treat yourself. This medicine may decrease your body's ability to fight infection.  You should make sure you get enough calcium and vitamin D while you are taking this medicine, unless your doctor tells you not to. Discuss the foods you eat and the vitamins you take with your health care professional.  See your dentist regularly. Brush and floss your teeth as directed. Before you have any dental work done, tell your dentist you are receiving this medicine.  Do not become pregnant while taking this medicine or for 5 months after stopping it. Women should  inform their doctor if they wish to become pregnant or think they might be pregnant. There is a potential for serious side effects to an unborn child. Talk to your health care professional or pharmacist for more information.  NOTE:This sheet is a summary. It may not cover all possible information. If you have questions about this medicine, talk to your doctor, pharmacist, or health care provider. Copyright© 2017 [employer]

## 2020-09-14 NOTE — TELEPHONE ENCOUNTER
----- Message from Dilshad Rogers MD sent at 9/14/2020  3:25 PM CDT -----  Degenerative disc disease. Consult with physical therapy and pain specialist as advised during the visit.

## 2020-09-15 ENCOUNTER — TELEPHONE (OUTPATIENT)
Dept: HEMATOLOGY/ONCOLOGY | Facility: CLINIC | Age: 80
End: 2020-09-15

## 2020-09-15 NOTE — TELEPHONE ENCOUNTER
----- Message from Madonna Meade sent at 9/15/2020 12:09 PM CDT -----  Regarding: returning a call  Contact: pt [phone]  Type:  Patient Returning Call    Who Called:Violet  Who Left Message for Patient:  Does the patient know what this is regarding?:no  Would the patient rather a call back or a response via [facility]? Call back   Best Call Back Number:[phone]  Additional Information:

## 2020-09-16 ENCOUNTER — CLINICAL SUPPORT (OUTPATIENT)
Dept: REHABILITATION | Facility: HOSPITAL | Age: 80
End: 2020-09-16
Payer: MEDICARE

## 2020-09-16 DIAGNOSIS — M54.50 CHRONIC MIDLINE LOW BACK PAIN WITHOUT SCIATICA: ICD-10-CM

## 2020-09-16 DIAGNOSIS — G89.29 CHRONIC MIDLINE LOW BACK PAIN WITHOUT SCIATICA: ICD-10-CM

## 2020-09-16 DIAGNOSIS — R53.1 DECREASED STRENGTH: ICD-10-CM

## 2020-09-16 PROCEDURE — 97161 PT EVAL LOW COMPLEX 20 MIN: CPT | Mod: HCNC

## 2020-09-16 NOTE — PLAN OF CARE
[facility]  Physical Therapy Initial Evaluation    Name: Violet Mcnally [facility] [place]  Clinic Number: [id number]    Therapy Diagnosis:   Encounter Diagnosis   Name Primary?    Chronic midline low back pain without sciatica      Physician: Dilshad Rogers,*    Physician Orders: PT Eval and Treat   Medical Diagnosis from Referral: Chronic midline low back pain without sciatica  Evaluation Date: 9/16/2020  Authorization Period Expiration: 9/14/21  Plan of Care Expiration: 10/16/20  Visit # / Visits authorized: 1/ 1    Time In: 11:30  Time Out: 12:10  Total Billable Time: 10 minutes    Precautions: Fall, pacemaker    Subjective   Date of onset: 1 week, but several years of chronic exacerbation since she was 31  History of current condition - Violet reports: that it started about 1 week ago, but has had lower back pain on and off for several years. Reports just the lower back and not down her legs. No therapy for back. When it gets flared up, very hard to clam back down. Reports that some rub on her back helps a little better. Walking makes it worse and is having to use the 4WW for her lower back currently. Reports standing makes the pressure worse. Sitting and leaning forward is her most comfortable position. Laying flat is the best with knees bent. Reports being told she would have back problems for the rest of her life. Since she had chemo her memory is not the best- been almost two years. Reports that she had a lot of difficulty with kidneys, heart, and lungs ever since she had the chemo. Very active before the last two years.     Pain:  Current 7/10, worst 9/10, best 2/10   Location: bilateral back   Description: Throbbing, Grabbing, Tight and Sharp  Aggravating Factors: Standing, Walking, Lifting and Getting out of bed/chair  Easing Factors: rest    Prior Therapy: yes, for shoulder and her legs  Social History:  lives with their spouse  Occupation: not working  Prior Level of  Function: MI  Current Level of Function: MI with need of 4WW    Imaging: XRAY: nothing significant    Medical History:   Past Medical History:   Diagnosis Date    Age-related osteoporosis without current pathological fracture 8/20/2018    Anemia     Anxiety     Arthritis     Atrial flutter     Cancer     lymphoma Large cell B    CHF (congestive heart failure)     Chronic anemia 4/26/2017    Chronic midline low back pain with right-sided sciatica 8/20/2018    Coronary artery disease     01/2015 LHC patent LCX. 50% stenosis in LAD and RCA.      Depression     Disorder of kidney and ureter     Encounter for blood transfusion     GERD (gastroesophageal reflux disease)     Gout, arthritis     Heart failure     Hx of psychiatric care     Hyperlipidemia     Hypertension     Hypothyroidism     Immune deficiency disorder     Kidney disease     Lung nodule 2014    RML--stable    Obesity     Pacemaker     Metronic    Paroxysmal atrial fibrillation 3/15/2018    Pneumonia     Polyneuropathy     chemo induced    Psychiatric problem     Tobacco dependence     quit 1976    Trouble in sleeping        Surgical History:   Violet Swenson  has a past surgical history that includes Hernia repair; Gastric bypass (1993); Coronary stent placement (02/05/2014); Cardiac pacemaker placement (01/22/2015); Coronary angioplasty (02/2014); Colonoscopy (N/A, 4/6/2017); Appendectomy (1966 approx); Cholecystectomy (1993); Joint replacement (Bilateral, 2009); Colon surgery (Right, 2017); Tonsillectomy (1959); Esophagogastroduodenoscopy (N/A, 11/28/2018); and Colonoscopy (N/A, 11/28/2018).    Medications:   Violet has a current medication list which includes the following prescription(s): ascorbic acid (vitamin c), aspirin, buspirone, calcitriol, clopidogrel, diclofenac sodium, duloxetine, ergocalciferol (vitamin d2), fluad quad 2020-21(65y up)(pf), fluocinolone, fluticasone propionate, gabapentin,  hydrocodone-acetaminophen, iron-vitamin c 100-250 mg (icar-c), levocetirizine, levothyroxine, metoprolol tartrate, mirtazapine, multivitamin, pravastatin, ranitidine, sertraline, sodium bicarbonate, sodium zirconium cyclosilicate, tramadol, tretinoin, triamcinolone acetonide 0.025%, vitamin d2, and zinc acetate, and the following Facility-Administered Medications: denosumab.    Allergies:   Review of patient's allergies indicates:   Allergen Reactions    Corticosteroids (glucocorticoids) Nausea Only and Other (See Comments)     Stomach pain, dizziness, headache    Oxycodone Other (See Comments)     Blood pressure dropped      Pts goals: improve ability to walk, decrease pain, get stronger    Objective       CMS Impairment/Limitation/Restriction for FOTO Lumbar Survey    Therapist reviewed FOTO scores for Violet Swenson on 9/16/2020.   FOTO documents entered into [employer] - see Media section.    Limitation Score: NA- patient did not finish her survey  Category: NA    Current : NA  Goal: NA  Discharge: NA       Posture/Structure:  Increased thoracic kyphosis  Gait: needs use of 4WW, very slow joseph    Neuro/Sensation:    Reflexes:  normal  Sensation: normal    Squat:   Double leg: not able to perform without UE support        Single leg: not able to perform    L/sp AROM:   % Pain Present (Y/N)   FB 75% Y   RSB 50% Y   LSB 50% N   BB 25% Y   RRot 50% N   LRot 50% N     Strength:  Hip flexors  3+/5 3+/5  Quadriceps  4/5 4/5      Hamstrings  4/5 4/5     Anterior Tibialis 3+/5 3+/5     Peroneals  3+/5 3+/5     Gastrocnemius 3+/5 3+/5     Adductors  3+/5 3+/5     External rotators 3+/5 3+/5     Gluteus Medius 3+/5 3+/5     Gluteus Cornelio 3/5 3/5    Special Test: Slump Test:  neg  Distraction:  neg    SLR Test:  neg  Quadrant:  neg        PIVM Lumbar: tender and hypomobile    Thoracic Mobility: hypomobile      Palpation: tender to B paraspinals, noted just overall very hypotonic muscles    Neural Tension Test:  negative    TREATMENT   Treatment Time In: 12:00  Treatment Time Out: 12:10  Total Treatment time separate from Evaluation: 10 minutes    Violet received therapeutic exercises to develop strength and flexibility for 5 minutes including:  Seated trunk FL  Supine PPT withi march    Violet received the following manual therapy techniques: Soft tissue Mobilization were applied to the: lumbar spine for 5 minutes, including:  STM to lumbar paraspinals    Home Exercises and Patient Education Provided    Education provided:   -Education on condition, HEP, and progression of rehab    Written Home Exercises Provided: yes.  Exercises were reviewed and Violet was able to demonstrate them prior to the end of the session.  Violet demonstrated good  understanding of the education provided.     See EMR under Patient Instructions for exercises provided 9/16/2020.    Assessment   Violet is a 80 y.o. female referred to outpatient Physical Therapy with a medical diagnosis of low back pain. Pt presents with decreased lumbar ROM, increased pain/adverse symptoms, decreased strength, impairment in gait, decreased tolerance to activity, and impaired balance. Patient's symptoms to be most consistent with overall very decreased strength and increased muscle tension to lumbar paraspinals.    Pt prognosis is Good.   Pt will benefit from skilled outpatient Physical Therapy to address the deficits stated above and in the chart below, provide pt/family education, and to maximize pt's level of independence.     Plan of care discussed with patient: Yes  Pt's spiritual, cultural and educational needs considered and patient is agreeable to the plan of care and goals as stated below:     Anticipated Barriers for therapy: chronicity of symptoms, overall very decreased activity/strength    Medical Necessity is demonstrated by the following  History  Co-morbidities and personal factors that may impact the plan of care Co-morbidities:   anxiety, CAD,  CHF, depression and history of cancer    Personal Factors:   no deficits     moderate   Examination  Body Structures and Functions, activity limitations and participation restrictions that may impact the plan of care Body Regions:   back  lower extremities    Body Systems:    ROM  strength  balance  gait  transfers  transitions  motor control  motor learning    Participation Restrictions:   ADLs, wanted activities    Activity limitations:   Learning and applying knowledge  no deficits    General Tasks and Commands  no deficits    Communication  no deficits    Mobility  lifting and carrying objects  walking    Self care  no deficits    Domestic Life  shopping  cooking  doing house work (cleaning house, washing dishes, laundry)  assisting others    Interactions/Relationships  no deficits    Life Areas  no deficits    Community and Social Life  community life  recreation and leisure         low   Clinical Presentation stable and uncomplicated low   Decision Making/ Complexity Score: low     Goals:  Short Term Goals: In 4 weeks   1.I with HEP  2.Pt to increase lumbar ROM to 75% pain free  3. Pt to increase BLE strength by 1/2 grade  4.Pt to have pain less than 3/10 at all times.    Long Term Goals: In 8 weeks  1.Pt to score less than 20% impaired on the foto  2. Patient to demo increase in LE strength by 1 grade  3. Patient to have decreased pain to 1/10 at all times.  4. Patient to demo increase lumbar ROM to 90% pain free  5. Patient to perform daily activities including walking without limitation.    Plan   Plan of care Certification: 9/16/2020 to 10/16/20.    Outpatient Physical Therapy 2 times weekly for 8 weeks to include the following interventions: Gait Training, Manual Therapy, Moist Heat/ Ice, Neuromuscular Re-ed, Patient Education, Therapeutic Activites and Therapeutic Exercise.     Sarbjit Vargas, PT    Thank you for this referral.    These services are reasonable and necessary for the conditions set  forth above while under my care.

## 2020-09-17 ENCOUNTER — OFFICE VISIT (OUTPATIENT)
Dept: CARDIOLOGY | Facility: CLINIC | Age: 80
End: 2020-09-17
Payer: MEDICARE

## 2020-09-17 VITALS
OXYGEN SATURATION: 85 % | HEART RATE: 87 BPM | WEIGHT: 141.13 LBS | SYSTOLIC BLOOD PRESSURE: 122 MMHG | DIASTOLIC BLOOD PRESSURE: 86 MMHG | BODY MASS INDEX: 24.22 KG/M2

## 2020-09-17 DIAGNOSIS — Z96.653 S/P TKR (TOTAL KNEE REPLACEMENT), BILATERAL: Chronic | ICD-10-CM

## 2020-09-17 DIAGNOSIS — M79.2 NEUROPATHIC PAIN OF RIGHT FOOT: ICD-10-CM

## 2020-09-17 DIAGNOSIS — G62.0 CHEMOTHERAPY-INDUCED NEUROPATHY: Chronic | ICD-10-CM

## 2020-09-17 DIAGNOSIS — Z95.0 CARDIAC PACEMAKER IN SITU: ICD-10-CM

## 2020-09-17 DIAGNOSIS — E78.2 MIXED HYPERLIPIDEMIA: Chronic | ICD-10-CM

## 2020-09-17 DIAGNOSIS — M1A.09X0 IDIOPATHIC CHRONIC GOUT OF MULTIPLE SITES WITHOUT TOPHUS: Chronic | ICD-10-CM

## 2020-09-17 DIAGNOSIS — Z86.79 HISTORY OF CONGESTIVE HEART FAILURE: ICD-10-CM

## 2020-09-17 DIAGNOSIS — Z98.84 S/P GASTRIC BYPASS: Chronic | ICD-10-CM

## 2020-09-17 DIAGNOSIS — E03.9 HYPOTHYROIDISM (ACQUIRED): Chronic | ICD-10-CM

## 2020-09-17 DIAGNOSIS — M15.9 PRIMARY OSTEOARTHRITIS INVOLVING MULTIPLE JOINTS: Chronic | ICD-10-CM

## 2020-09-17 DIAGNOSIS — N18.30 STAGE 3 CHRONIC KIDNEY DISEASE: Chronic | ICD-10-CM

## 2020-09-17 DIAGNOSIS — I10 ESSENTIAL HYPERTENSION: Chronic | ICD-10-CM

## 2020-09-17 DIAGNOSIS — I25.10 CORONARY ARTERY DISEASE INVOLVING NATIVE CORONARY ARTERY OF NATIVE HEART WITHOUT ANGINA PECTORIS: Primary | Chronic | ICD-10-CM

## 2020-09-17 DIAGNOSIS — I25.5 ISCHEMIC CARDIOMYOPATHY: Chronic | ICD-10-CM

## 2020-09-17 DIAGNOSIS — Z95.0 PACEMAKER: Chronic | ICD-10-CM

## 2020-09-17 DIAGNOSIS — I70.0 CALCIFICATION OF AORTA: Chronic | ICD-10-CM

## 2020-09-17 DIAGNOSIS — C85.83 LARGE CELL LYMPHOMA OF INTRA-ABDOMINAL LYMPH NODES: Chronic | ICD-10-CM

## 2020-09-17 DIAGNOSIS — D64.9 CHRONIC ANEMIA: Chronic | ICD-10-CM

## 2020-09-17 DIAGNOSIS — T45.1X5A CHEMOTHERAPY-INDUCED NEUROPATHY: Chronic | ICD-10-CM

## 2020-09-17 DIAGNOSIS — E55.9 VITAMIN D DEFICIENCY: ICD-10-CM

## 2020-09-17 DIAGNOSIS — I48.0 PAROXYSMAL ATRIAL FIBRILLATION: Chronic | ICD-10-CM

## 2020-09-17 PROBLEM — R53.1 DECREASED STRENGTH: Status: ACTIVE | Noted: 2020-09-17

## 2020-09-17 PROCEDURE — 99214 PR OFFICE/OUTPT VISIT, EST, LEVL IV, 30-39 MIN: ICD-10-PCS | Mod: HCNC,S$GLB,, | Performed by: INTERNAL MEDICINE

## 2020-09-17 PROCEDURE — 1100F PTFALLS ASSESS-DOCD GE2>/YR: CPT | Mod: HCNC,CPTII,S$GLB, | Performed by: INTERNAL MEDICINE

## 2020-09-17 PROCEDURE — 1125F PR PAIN SEVERITY QUANTIFIED, PAIN PRESENT: ICD-10-PCS | Mod: HCNC,S$GLB,, | Performed by: INTERNAL MEDICINE

## 2020-09-17 PROCEDURE — 99999 PR PBB SHADOW E&M-EST. PATIENT-LVL IV: CPT | Mod: PBBFAC,HCNC,, | Performed by: INTERNAL MEDICINE

## 2020-09-17 PROCEDURE — 99999 PR PBB SHADOW E&M-EST. PATIENT-LVL IV: ICD-10-PCS | Mod: PBBFAC,HCNC,, | Performed by: INTERNAL MEDICINE

## 2020-09-17 PROCEDURE — 1159F PR MEDICATION LIST DOCUMENTED IN MEDICAL RECORD: ICD-10-PCS | Mod: HCNC,S$GLB,, | Performed by: INTERNAL MEDICINE

## 2020-09-17 PROCEDURE — 3074F PR MOST RECENT SYSTOLIC BLOOD PRESSURE < 130 MM HG: ICD-10-PCS | Mod: HCNC,CPTII,S$GLB, | Performed by: INTERNAL MEDICINE

## 2020-09-17 PROCEDURE — 3079F PR MOST RECENT DIASTOLIC BLOOD PRESSURE 80-89 MM HG: ICD-10-PCS | Mod: HCNC,CPTII,S$GLB, | Performed by: INTERNAL MEDICINE

## 2020-09-17 PROCEDURE — 3079F DIAST BP 80-89 MM HG: CPT | Mod: HCNC,CPTII,S$GLB, | Performed by: INTERNAL MEDICINE

## 2020-09-17 PROCEDURE — 1100F PR PT FALLS ASSESS DOC 2+ FALLS/FALL W/INJURY/YR: ICD-10-PCS | Mod: HCNC,CPTII,S$GLB, | Performed by: INTERNAL MEDICINE

## 2020-09-17 PROCEDURE — 1159F MED LIST DOCD IN RCRD: CPT | Mod: HCNC,S$GLB,, | Performed by: INTERNAL MEDICINE

## 2020-09-17 PROCEDURE — 3288F PR FALLS RISK ASSESSMENT DOCUMENTED: ICD-10-PCS | Mod: HCNC,CPTII,S$GLB, | Performed by: INTERNAL MEDICINE

## 2020-09-17 PROCEDURE — 1125F AMNT PAIN NOTED PAIN PRSNT: CPT | Mod: HCNC,S$GLB,, | Performed by: INTERNAL MEDICINE

## 2020-09-17 PROCEDURE — 3288F FALL RISK ASSESSMENT DOCD: CPT | Mod: HCNC,CPTII,S$GLB, | Performed by: INTERNAL MEDICINE

## 2020-09-17 PROCEDURE — 99214 OFFICE O/P EST MOD 30 MIN: CPT | Mod: HCNC,S$GLB,, | Performed by: INTERNAL MEDICINE

## 2020-09-17 PROCEDURE — 3074F SYST BP LT 130 MM HG: CPT | Mod: HCNC,CPTII,S$GLB, | Performed by: INTERNAL MEDICINE

## 2020-09-17 NOTE — PROGRESS NOTES
Subjective:   Patient ID:  Violet Swenson is a 80 y.o. female who presents for follow up of No chief complaint on file.      HPI  A 81 yo female with ;ymphoma  S/p pcae cad s/p stent ckd heart failure ios here for f/u she has low back pain limited has had recurrent episodes of intrascapular pain radiating to left arm feels like muscle spasm none with exertion. No chf symptoms no leg swelling tia claudication chest pain syncope near syncope/.no palpitation. Has some improvement in appetite  .  Past Medical History:   Diagnosis Date    Age-related osteoporosis without current pathological fracture 8/20/2018    Anemia     Anxiety     Arthritis     Atrial flutter     Cancer     lymphoma Large cell B    CHF (congestive heart failure)     Chronic anemia 4/26/2017    Chronic midline low back pain with right-sided sciatica 8/20/2018    Coronary artery disease     01/2015 LHC patent LCX. 50% stenosis in LAD and RCA.      Depression     Disorder of kidney and ureter     Encounter for blood transfusion     GERD (gastroesophageal reflux disease)     Gout, arthritis     Heart failure     Hx of psychiatric care     Hyperlipidemia     Hypertension     Hypothyroidism     Immune deficiency disorder     Kidney disease     Lung nodule 2014    RML--stable    Obesity     Pacemaker     Metronic    Paroxysmal atrial fibrillation 3/15/2018    Pneumonia     Polyneuropathy     chemo induced    Psychiatric problem     Tobacco dependence     quit 1976    Trouble in sleeping        Past Surgical History:   Procedure Laterality Date    APPENDECTOMY  1966 approx    CARDIAC PACEMAKER PLACEMENT  01/22/2015    CHOLECYSTECTOMY  1993    incidental at time of gastric bypass    COLON SURGERY Right 2017    hemicolectomy    COLONOSCOPY N/A 4/6/2017    Procedure: COLONOSCOPY;  Surgeon: Tye Enamorado MD;  Location: [facility];  Service: Endoscopy;  Laterality: N/A;    COLONOSCOPY N/A 11/28/2018    Procedure:  "COLONOSCOPY;  Surgeon: Saúl Arthur III, MD;  Location: [facility];  Service: Endoscopy;  Laterality: N/A;    CORONARY ANGIOPLASTY  2014    CORONARY STENT PLACEMENT  2014    ESOPHAGOGASTRODUODENOSCOPY N/A 2018    Procedure: EGD (ESOPHAGOGASTRODUODENOSCOPY);  Surgeon: Saúl Arthur III, MD;  Location: [facility];  Service: Endoscopy;  Laterality: N/A;    GASTRIC BYPASS      with incidental choly    HERNIA REPAIR      JOINT REPLACEMENT Bilateral 2009    3 months apart    TONSILLECTOMY         Social History     Tobacco Use    Smoking status: Former Smoker     Packs/day: 2.00     Years: 6.00     Pack years: 12.00     Quit date: 3/15/1976     Years since quittin.5    Smokeless tobacco: Never Used   Substance Use Topics    Alcohol use: No     Alcohol/week: 0.0 standard drinks    Drug use: No       Family History   Problem Relation Age of Onset    Heart disease Mother     Hypertension Mother     Cataracts Mother     Stomach cancer Father         "ulcers that turned to cancer"    Cancer Father         stomach    Pancreatic cancer Sister     Cancer Sister         pancreatic    Leukemia Brother         "leukemia which led to intestinal cancer"    Cataracts Brother     Cancer Brother         leukemia then later stomach cancer    Drug abuse Son     Cancer Son         prostate cancer    Prostate cancer Son     COPD Daughter     Asthma Daughter     Hypertension Maternal Grandfather     Stroke Maternal Grandfather     Alcohol abuse Neg Hx     Diabetes Neg Hx     Mental retardation Neg Hx     Mental illness Neg Hx        Current Outpatient Medications   Medication Sig    ascorbic acid, vitamin C, (VITAMIN C) 100 MG tablet Take by mouth once daily.    aspirin (ECOTRIN) 81 MG EC tablet Take 81 mg by mouth nightly.     busPIRone (BUSPAR) 7.5 MG tablet TAKE ONE TABLET BY MOUTH THREE TIMES DAILY    calcitRIOL (ROCALTROL) 0.25 MCG Cap TAKE ONE CAPSULE BY MOUTH EVERY " MONDAY, WEDNESDAY AND FRIDAY    clopidogrel (PLAVIX) 75 mg tablet TAKE ONE TABLET BY MOUTH EVERY DAY    diclofenac sodium 1 % Gel Apply 2 g topically once daily. Apply 2 g over painful joints once or twice a day.    DULoxetine (CYMBALTA) 30 MG capsule Take 1 capsule (30 mg total) by mouth once daily.    ergocalciferol, vitamin D2, 400 unit Tab Take by mouth.    flu vac 2020 [id number],PF, (FLUAD QUAD 2020-21,65Y UP,,PF,) 60 mcg (15 mcg x 4)/0.5 mL Syrg Inject 0.5 mLs into the muscle.    fluocinolone (SYNALAR) 0.01 % external solution AAA of scalp twice a day prn itching.    fluticasone propionate (FLONASE) 50 mcg/actuation nasal spray 2 sprays (100 mcg total) by Each Nare route once daily.    gabapentin (NEURONTIN) 300 MG capsule TAKE ONE CAPSULE BY MOUTH THREE TIMES DAILY    HYDROcodone-acetaminophen (NORCO)  mg per tablet Take 1 tablet by mouth every 6 (six) hours as needed for Pain.    iron-vitamin C 100-250 mg, ICAR-C, 100-250 mg Tab TAKE ONE TABLET BY MOUTH EVERY DAY    levocetirizine (XYZAL) 5 MG tablet Take 1 tablet (5 mg total) by mouth every evening.    levothyroxine (SYNTHROID) 75 MCG tablet TAKE ONE TABLET BY MOUTH EVERY DAY BEFORE BREAKFAST    metoprolol tartrate (LOPRESSOR) 25 MG tablet TAKE ONE TABLET BY MOUTH TWICE DAILY    mirtazapine (REMERON SOL-TAB) 15 MG disintegrating tablet DISSOLVE ONE TABLET BY MOUTH NIGHTLY    multivitamin (ONE DAILY MULTIVITAMIN) per tablet Take 1 tablet by mouth once daily.    pravastatin (PRAVACHOL) 40 MG tablet TAKE ONE TABLET BY MOUTH ONCE DAILY    ranitidine (ZANTAC) 150 MG capsule Take 150 mg by mouth nightly.     sertraline (ZOLOFT) 100 MG tablet TAKE 1 & 1/2 TABLETS BY MOUTH EVERY DAY    sodium bicarbonate 650 MG tablet TAKE ONE TABLET BY MOUTH TWICE DAILY    sodium zirconium cyclosilicate (LOKELMA) 5 gram packet Take 1 packet (5 g total) by mouth once daily. Mix entire contents of packet(s) into drinking glass containing >3 tablespoons  of water; stir well and drink immediately. If powder remains in the drinking glass, add water and repeat until no powder remains to ensure entire dose is taken.    traMADoL (ULTRAM) 50 mg tablet Take 1 tablet (50 mg total) by mouth every 12 (twelve) hours as needed for Pain.    tretinoin (RETIN-A) 0.025 % cream Apply a pea-sized amount to the entire face at bedtime.  Use every third night and increase as tolerated to nightly.    triamcinolone acetonide 0.025% (KENALOG) 0.025 % cream Apply topically 2 (two) times daily. PRN itching.    VITAMIN D2 1,250 mcg (50,000 unit) capsule Take 1 capsule (50,000 Units total) by mouth every 7 days.    ZINC ACETATE ORAL Take 250 mg by mouth once daily.      Current Facility-Administered Medications   Medication    denosumab (PROLIA) injection 60 mg     Current Outpatient Medications on File Prior to Visit   Medication Sig    ascorbic acid, vitamin C, (VITAMIN C) 100 MG tablet Take by mouth once daily.    aspirin (ECOTRIN) 81 MG EC tablet Take 81 mg by mouth nightly.     busPIRone (BUSPAR) 7.5 MG tablet TAKE ONE TABLET BY MOUTH THREE TIMES DAILY    calcitRIOL (ROCALTROL) 0.25 MCG Cap TAKE ONE CAPSULE BY MOUTH EVERY MONDAY, WEDNESDAY AND FRIDAY    clopidogrel (PLAVIX) 75 mg tablet TAKE ONE TABLET BY MOUTH EVERY DAY    diclofenac sodium 1 % Gel Apply 2 g topically once daily. Apply 2 g over painful joints once or twice a day.    DULoxetine (CYMBALTA) 30 MG capsule Take 1 capsule (30 mg total) by mouth once daily.    ergocalciferol, vitamin D2, 400 unit Tab Take by mouth.    flu vac 2020 [id number],PF, (FLUAD QUAD 2020-21,65Y UP,,PF,) 60 mcg (15 mcg x 4)/0.5 mL Syrg Inject 0.5 mLs into the muscle.    fluocinolone (SYNALAR) 0.01 % external solution AAA of scalp twice a day prn itching.    fluticasone propionate (FLONASE) 50 mcg/actuation nasal spray 2 sprays (100 mcg total) by Each Nare route once daily.    gabapentin (NEURONTIN) 300 MG capsule TAKE ONE CAPSULE BY  MOUTH THREE TIMES DAILY    HYDROcodone-acetaminophen (NORCO)  mg per tablet Take 1 tablet by mouth every 6 (six) hours as needed for Pain.    iron-vitamin C 100-250 mg, ICAR-C, 100-250 mg Tab TAKE ONE TABLET BY MOUTH EVERY DAY    levocetirizine (XYZAL) 5 MG tablet Take 1 tablet (5 mg total) by mouth every evening.    levothyroxine (SYNTHROID) 75 MCG tablet TAKE ONE TABLET BY MOUTH EVERY DAY BEFORE BREAKFAST    metoprolol tartrate (LOPRESSOR) 25 MG tablet TAKE ONE TABLET BY MOUTH TWICE DAILY    mirtazapine (REMERON SOL-TAB) 15 MG disintegrating tablet DISSOLVE ONE TABLET BY MOUTH NIGHTLY    multivitamin (ONE DAILY MULTIVITAMIN) per tablet Take 1 tablet by mouth once daily.    pravastatin (PRAVACHOL) 40 MG tablet TAKE ONE TABLET BY MOUTH ONCE DAILY    ranitidine (ZANTAC) 150 MG capsule Take 150 mg by mouth nightly.     sertraline (ZOLOFT) 100 MG tablet TAKE 1 & 1/2 TABLETS BY MOUTH EVERY DAY    sodium bicarbonate 650 MG tablet TAKE ONE TABLET BY MOUTH TWICE DAILY    sodium zirconium cyclosilicate (LOKELMA) 5 gram packet Take 1 packet (5 g total) by mouth once daily. Mix entire contents of packet(s) into drinking glass containing >3 tablespoons of water; stir well and drink immediately. If powder remains in the drinking glass, add water and repeat until no powder remains to ensure entire dose is taken.    traMADoL (ULTRAM) 50 mg tablet Take 1 tablet (50 mg total) by mouth every 12 (twelve) hours as needed for Pain.    tretinoin (RETIN-A) 0.025 % cream Apply a pea-sized amount to the entire face at bedtime.  Use every third night and increase as tolerated to nightly.    triamcinolone acetonide 0.025% (KENALOG) 0.025 % cream Apply topically 2 (two) times daily. PRN itching.    VITAMIN D2 1,250 mcg (50,000 unit) capsule Take 1 capsule (50,000 Units total) by mouth every 7 days.    ZINC ACETATE ORAL Take 250 mg by mouth once daily.      Current Facility-Administered Medications on File Prior to Visit    Medication    denosumab (PROLIA) injection 60 mg     Review of patient's allergies indicates:   Allergen Reactions    Corticosteroids (glucocorticoids) Nausea Only and Other (See Comments)     Stomach pain, dizziness, headache    Oxycodone Other (See Comments)     Blood pressure dropped     Review of Systems   Constitution: Negative for diaphoresis, malaise/fatigue and weight gain.   HENT: Negative for hoarse voice.    Eyes: Negative for double vision and visual disturbance.   Cardiovascular: Negative for chest pain, claudication, cyanosis, dyspnea on exertion, irregular heartbeat, leg swelling, near-syncope, orthopnea, palpitations, paroxysmal nocturnal dyspnea and syncope.   Respiratory: Negative for cough, hemoptysis, shortness of breath and snoring.    Hematologic/Lymphatic: Negative for bleeding problem. Does not bruise/bleed easily.   Skin: Negative for color change and poor wound healing.   Musculoskeletal: Positive for arthritis, back pain, joint pain and stiffness. Negative for muscle cramps, muscle weakness and myalgias.   Gastrointestinal: Negative for bloating, abdominal pain, change in bowel habit, diarrhea, heartburn, hematemesis, hematochezia, melena and nausea.   Neurological: Negative for excessive daytime sleepiness, dizziness, headaches, light-headedness, loss of balance, numbness and weakness.   Psychiatric/Behavioral: Negative for memory loss. The patient does not have insomnia.    Allergic/Immunologic: Negative for hives.       Objective:   Physical Exam   Constitutional: She is oriented to person, place, and time. She appears well-developed and well-nourished. She does not appear ill. No distress.   HENT:   Head: Normocephalic and atraumatic.   Eyes: Pupils are equal, round, and reactive to light. EOM are normal. No scleral icterus.   Neck: Normal range of motion. Neck supple. Normal carotid pulses, no hepatojugular reflux and no JVD present. Carotid bruit is not present. No tracheal  deviation present. No thyromegaly present.   Cardiovascular: Normal rate, regular rhythm, normal heart sounds, intact distal pulses and normal pulses. Exam reveals no gallop and no friction rub.   No murmur heard.  Pulmonary/Chest: Effort normal and breath sounds normal. No respiratory distress. She has no wheezes. She has no rhonchi. She has no rales. She exhibits no tenderness.   Pacer site well healed.    Abdominal: Soft. Normal appearance, normal aorta and bowel sounds are normal. She exhibits no distension, no abdominal bruit, no ascites and no pulsatile midline mass. There is no hepatomegaly. There is no abdominal tenderness.   Musculoskeletal:         General: No edema.      Right shoulder: She exhibits no deformity.   Neurological: She is alert and oriented to person, place, and time. She has normal strength. No cranial nerve deficit. Coordination normal.   Skin: Skin is warm and dry. No rash noted. She is not diaphoretic. No cyanosis or erythema. Nails show no clubbing.   Psychiatric: She has a normal mood and affect. Her speech is normal and behavior is normal.   Nursing note and vitals reviewed.    Vitals:    09/17/20 1440   BP: 122/86   BP Location: Left arm   Patient Position: Sitting   BP Method: Medium (Manual)   Pulse: 87   SpO2: (!) 85%   Weight: 64 kg (141 lb 1.5 oz)     Lab Results   Component Value Date    CHOL 118 (L) 09/14/2020    CHOL 133 08/25/2020    CHOL 92 (L) 07/19/2018     Lab Results   Component Value Date    HDL 55 09/14/2020    HDL 55 08/25/2020    HDL 55 07/19/2018     Lab Results   Component Value Date    LDLCALC 45.8 (L) 09/14/2020    LDLCALC 59.0 (L) 08/25/2020    LDLCALC 26.4 (L) 07/19/2018     Lab Results   Component Value Date    TRIG 86 09/14/2020    TRIG 95 08/25/2020    TRIG 53 07/19/2018     Lab Results   Component Value Date    CHOLHDL 46.6 09/14/2020    CHOLHDL 41.4 08/25/2020    CHOLHDL 59.8 (H) 07/19/2018       Chemistry        Component Value Date/Time      09/14/2020 1200     09/14/2020 1200    K 5.2 (H) 09/14/2020 1200    K 5.2 (H) 09/14/2020 1200     (H) 09/14/2020 1200     (H) 09/14/2020 1200    CO2 24 09/14/2020 1200    CO2 24 09/14/2020 1200    BUN 25 (H) 09/14/2020 1200    BUN 25 (H) 09/14/2020 1200    CREATININE 1.5 (H) 09/14/2020 1200    CREATININE 1.5 (H) 09/14/2020 1200    GLU 88 09/14/2020 1200    GLU 88 09/14/2020 1200        Component Value Date/Time    CALCIUM 9.3 09/14/2020 1200    CALCIUM 9.3 09/14/2020 1200    ALKPHOS 60 09/14/2020 1200    ALKPHOS 60 09/14/2020 1200    AST 44 (H) 09/14/2020 1200    AST 44 (H) 09/14/2020 1200    ALT 32 09/14/2020 1200    ALT 32 09/14/2020 1200    BILITOT 0.3 09/14/2020 1200    BILITOT 0.3 09/14/2020 1200    ESTGFRAFRICA 38 (A) 09/14/2020 1200    ESTGFRAFRICA 38 (A) 09/14/2020 1200    EGFRNONAA 33 (A) 09/14/2020 1200    EGFRNONAA 33 (A) 09/14/2020 1200          Lab Results   Component Value Date    TSH 3.934 08/25/2020     Lab Results   Component Value Date    INR 1.0 11/30/2018    INR 1.0 09/01/2017    INR 1.0 04/05/2017     Lab Results   Component Value Date    WBC 6.41 09/01/2020    HGB 10.1 (L) 09/01/2020    HCT 32.2 (L) 09/01/2020     (H) 09/01/2020     09/01/2020     BMP  Sodium   Date Value Ref Range Status   09/14/2020 142 136 - 145 mmol/L Final   09/14/2020 142 136 - 145 mmol/L Final     Potassium   Date Value Ref Range Status   09/14/2020 5.2 (H) 3.5 - 5.1 mmol/L Final   09/14/2020 5.2 (H) 3.5 - 5.1 mmol/L Final     Chloride   Date Value Ref Range Status   09/14/2020 112 (H) 95 - 110 mmol/L Final   09/14/2020 112 (H) 95 - 110 mmol/L Final     CO2   Date Value Ref Range Status   09/14/2020 24 23 - 29 mmol/L Final   09/14/2020 24 23 - 29 mmol/L Final     BUN, Bld   Date Value Ref Range Status   09/14/2020 25 (H) 8 - 23 mg/dL Final   09/14/2020 25 (H) 8 - 23 mg/dL Final     Creatinine   Date Value Ref Range Status   09/14/2020 1.5 (H) 0.5 - 1.4 mg/dL Final   09/14/2020 1.5 (H) 0.5  - 1.4 mg/dL Final     Calcium   Date Value Ref Range Status   09/14/2020 9.3 8.7 - 10.5 mg/dL Final   09/14/2020 9.3 8.7 - 10.5 mg/dL Final     Anion Gap   Date Value Ref Range Status   09/14/2020 6 (L) 8 - 16 mmol/L Final   09/14/2020 6 (L) 8 - 16 mmol/L Final     eGFR if    Date Value Ref Range Status   09/14/2020 38 (A) >60 mL/min/1.73 m^2 Final   09/14/2020 38 (A) >60 mL/min/1.73 m^2 Final     eGFR if non    Date Value Ref Range Status   09/14/2020 33 (A) >60 mL/min/1.73 m^2 Final     Comment:     Calculation used to obtain the estimated glomerular filtration  rate (eGFR) is the CKD-EPI equation.      09/14/2020 33 (A) >60 mL/min/1.73 m^2 Final     Comment:     Calculation used to obtain the estimated glomerular filtration  rate (eGFR) is the CKD-EPI equation.        Estimated Creatinine Clearance: 26.3 mL/min (A) (based on SCr of 1.5 mg/dL (H)).    Assessment:     1. Coronary artery disease : multiple vessels, s/p PTCA    2. Essential hypertension    3. Mixed hyperlipidemia    4. Hypothyroidism (acquired)    5. Pacemaker    6. Idiopathic chronic gout of multiple sites without tophus    7. Primary osteoarthritis involving multiple joints    8. Ischemic cardiomyopathy    9. Chronic anemia    10. S/P gastric bypass    11. Calcification of aorta    12. 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 Stage 3 chronic kidney disease    14. Chemotherapy-induced neuropathy    15. S/P TKR (total knee replacement), bilateral    16. Paroxysmal atrial fibrillation    17. History of congestive heart failure    18. Neuropathic pain of right foot    19. Vitamin D deficiency    20. Cardiac pacemaker in situ      Cad asymptomatic   S/p pacer no arrythmias clinically   musculoskeletal symptoms no angina  Lipids on target  Plan:   Continue current therapy  Cardiac low salt diet.  Risk factor modification and excercise program.  F/u in 6 months with lipid cmp ..

## 2020-09-21 ENCOUNTER — OFFICE VISIT (OUTPATIENT)
Dept: OPHTHALMOLOGY | Facility: CLINIC | Age: 80
End: 2020-09-21
Payer: COMMERCIAL

## 2020-09-21 DIAGNOSIS — H25.13 CATARACT, NUCLEAR SCLEROTIC SENILE, BILATERAL: ICD-10-CM

## 2020-09-21 DIAGNOSIS — H52.4 BILATERAL PRESBYOPIA: ICD-10-CM

## 2020-09-21 DIAGNOSIS — H50.00 ESOTROPIA: Primary | ICD-10-CM

## 2020-09-21 PROCEDURE — 92014 COMPRE OPH EXAM EST PT 1/>: CPT | Mod: HCNC,S$GLB,, | Performed by: OPTOMETRIST

## 2020-09-21 PROCEDURE — 92014 PR EYE EXAM, EST PATIENT,COMPREHESV: ICD-10-PCS | Mod: HCNC,S$GLB,, | Performed by: OPTOMETRIST

## 2020-09-21 PROCEDURE — 92015 DETERMINE REFRACTIVE STATE: CPT | Mod: HCNC,S$GLB,, | Performed by: OPTOMETRIST

## 2020-09-21 PROCEDURE — 99999 PR PBB SHADOW E&M-EST. PATIENT-LVL III: CPT | Mod: PBBFAC,HCNC,, | Performed by: OPTOMETRIST

## 2020-09-21 PROCEDURE — 92015 PR REFRACTION: ICD-10-PCS | Mod: HCNC,S$GLB,, | Performed by: OPTOMETRIST

## 2020-09-21 PROCEDURE — 99999 PR PBB SHADOW E&M-EST. PATIENT-LVL III: ICD-10-PCS | Mod: PBBFAC,HCNC,, | Performed by: OPTOMETRIST

## 2020-09-21 NOTE — PROGRESS NOTES
HPI     Pt having blurry vision,  Pt states that she is having a hard time to read  8 months check up  Last visit 1/13/20    Last edited by Guy Mireles, OD on 9/21/2020 11:14 AM. (History)            Assessment /Plan     For exam results, see Encounter Report.    Esotropia    Cataract, nuclear sclerotic senile, bilateral    Bilateral presbyopia      Trial framed Rx today, pt did not appreciate the difference with/without prism.  Will not pt prism 2 diopter ODETTE OU in Rx today    Moderate cataracts OU, not surgical    Dispense Final Rx for glasses.  RTC 1 year  Discussed above and answered questions.

## 2020-09-23 DIAGNOSIS — F32.9 MAJOR DEPRESSION, CHRONIC: ICD-10-CM

## 2020-09-23 DIAGNOSIS — F41.9 ANXIETY: ICD-10-CM

## 2020-09-23 DIAGNOSIS — F41.8 SITUATIONAL ANXIETY: ICD-10-CM

## 2020-09-23 DIAGNOSIS — F41.9 INSOMNIA SECONDARY TO ANXIETY: ICD-10-CM

## 2020-09-23 DIAGNOSIS — F51.05 INSOMNIA SECONDARY TO ANXIETY: ICD-10-CM

## 2020-09-24 ENCOUNTER — LAB VISIT (OUTPATIENT)
Dept: LAB | Facility: HOSPITAL | Age: 80
End: 2020-09-24
Attending: INTERNAL MEDICINE
Payer: MEDICARE

## 2020-09-24 DIAGNOSIS — M81.0 AGE-RELATED OSTEOPOROSIS WITHOUT CURRENT PATHOLOGICAL FRACTURE: ICD-10-CM

## 2020-09-24 DIAGNOSIS — M1A.09X0 IDIOPATHIC CHRONIC GOUT OF MULTIPLE SITES WITHOUT TOPHUS: ICD-10-CM

## 2020-09-24 LAB
ALBUMIN SERPL BCP-MCNC: 3.7 G/DL (ref 3.5–5.2)
ALP SERPL-CCNC: 62 U/L (ref 55–135)
ALT SERPL W/O P-5'-P-CCNC: 56 U/L (ref 10–44)
ANION GAP SERPL CALC-SCNC: 5 MMOL/L (ref 8–16)
AST SERPL-CCNC: 80 U/L (ref 10–40)
BILIRUB SERPL-MCNC: 0.3 MG/DL (ref 0.1–1)
BUN SERPL-MCNC: 15 MG/DL (ref 8–23)
CALCIUM SERPL-MCNC: 7.8 MG/DL (ref 8.7–10.5)
CHLORIDE SERPL-SCNC: 116 MMOL/L (ref 95–110)
CO2 SERPL-SCNC: 19 MMOL/L (ref 23–29)
CREAT SERPL-MCNC: 1.2 MG/DL (ref 0.5–1.4)
EST. GFR  (AFRICAN AMERICAN): 50 ML/MIN/1.73 M^2
EST. GFR  (NON AFRICAN AMERICAN): 43 ML/MIN/1.73 M^2
GLUCOSE SERPL-MCNC: 88 MG/DL (ref 70–110)
POTASSIUM SERPL-SCNC: 5.3 MMOL/L (ref 3.5–5.1)
PROT SERPL-MCNC: 6.7 G/DL (ref 6–8.4)
SODIUM SERPL-SCNC: 140 MMOL/L (ref 136–145)

## 2020-09-24 PROCEDURE — 80053 COMPREHEN METABOLIC PANEL: CPT | Mod: HCNC

## 2020-09-24 PROCEDURE — 36415 COLL VENOUS BLD VENIPUNCTURE: CPT | Mod: HCNC

## 2020-09-24 RX ORDER — BUSPIRONE HYDROCHLORIDE 7.5 MG/1
TABLET ORAL
Qty: 90 TABLET | Refills: 3 | Status: SHIPPED | OUTPATIENT
Start: 2020-09-24 | End: 2020-12-24

## 2020-09-24 RX ORDER — SERTRALINE HYDROCHLORIDE 100 MG/1
TABLET, FILM COATED ORAL
Qty: 90 TABLET | Refills: 3 | Status: SHIPPED | OUTPATIENT
Start: 2020-09-24 | End: 2021-05-03

## 2020-09-24 NOTE — PROGRESS NOTES
Patient called and advised about low calcium. No symptoms. Also informed about abnormal liver- no new medications. On statin. Please schedule CMP in 4 weeks when she sees  . Thanks.

## 2020-09-25 ENCOUNTER — CLINICAL SUPPORT (OUTPATIENT)
Dept: REHABILITATION | Facility: HOSPITAL | Age: 80
End: 2020-09-25
Payer: MEDICARE

## 2020-09-25 ENCOUNTER — TELEPHONE (OUTPATIENT)
Dept: RHEUMATOLOGY | Facility: CLINIC | Age: 80
End: 2020-09-25

## 2020-09-25 DIAGNOSIS — R53.1 DECREASED STRENGTH: ICD-10-CM

## 2020-09-25 PROCEDURE — 97140 MANUAL THERAPY 1/> REGIONS: CPT | Mod: HCNC

## 2020-09-25 PROCEDURE — 97110 THERAPEUTIC EXERCISES: CPT | Mod: HCNC

## 2020-09-25 NOTE — TELEPHONE ENCOUNTER
----- Message from Dilshad Rogers MD sent at 9/24/2020  5:30 PM CDT -----  Patient called and advised about low calcium. No symptoms. Also informed about abnormal liver- no new medications. On statin. Please schedule CMP in 4 weeks when she sees  . Thanks.

## 2020-09-25 NOTE — TELEPHONE ENCOUNTER
Patient aware lab appt scheduled at the [facility] location on 11/3/2020 prior to her appt with Dr Roth

## 2020-09-25 NOTE — PROGRESS NOTES
Physical Therapy Treatment Note     Name: Violet Mcnally [facility]  Clinic Number: [id number]    Therapy Diagnosis:   Encounter Diagnosis   Name Primary?    Decreased strength      Physician: Dilshad Rogers,*    Visit Date: 9/25/2020    Physician Orders: PT Eval and Treat   Medical Diagnosis from Referral: Chronic midline low back pain without sciatica  Evaluation Date: 9/16/2020  Authorization Period Expiration: 9/14/21  Plan of Care Expiration: 10/16/20  Visit # / Visits authorized: 1/ 1    Time In: 1:30  Time Out: 2:15  Total Billable Time: 45 minutes    Precautions: Fall and pacemaker    Subjective     Pt reports: that her back has been feeling about the same as last treatment.  She was compliant with home exercise program.  Response to previous treatment: good  Functional change: none    Pain: 6/10  Location: right lower back    Objective     Violet received therapeutic exercises to develop strength, flexibility and posture for 35 minutes including:  Nustep x 5 min  Seated trunk FL against ball  Supine PPT + march  Prone hip extension  Seated PPT  Standing rows against TB  Standing isometric walk out with paloff press    Violet received the following manual therapy techniques: Joint mobilizations and Soft tissue Mobilization were applied to the: lumbar spine for 10 minutes, including:  Sideglides lumbar Grade 2  STM to lumbar paraspinals    Violet received hot pack for 10 minutes to lumbar spine.    Home Exercises Provided and Patient Education Provided     Education provided:   - Progression of rehab, exercise to decrease pain while    Written Home Exercises Provided: yes.  Exercises were reviewed and Violet was able to demonstrate them prior to the end of the session.  Violet demonstrated good  understanding of the education provided.     See EMR under Patient Instructions for exercises provided 9/25/2020.    Assessment     Patient demonstrated good tolerance to more strengthening this session  with minimal increase in adverse symptoms. Patient did have a flare up with pain with a more extensor movement, but was able to be calmed down with PPT.    Violet is progressing well towards her goals.   Pt prognosis is Good.     Pt will continue to benefit from skilled outpatient physical therapy to address the deficits listed in the problem list box on initial evaluation, provide pt/family education and to maximize pt's level of independence in the home and community environment.     Pt's spiritual, cultural and educational needs considered and pt agreeable to plan of care and goals.     Anticipated barriers to physical therapy: hronicity of symptoms, overall very decreased activity/strength    Goals:     Short Term Goals: In 4 weeks   1.I with HEP  2.Pt to increase lumbar ROM to 75% pain free  3. Pt to increase BLE strength by 1/2 grade  4.Pt to have pain less than 3/10 at all times.     Long Term Goals: In 8 weeks  1.Pt to score less than 20% impaired on the foto  2. Patient to demo increase in LE strength by 1 grade  3. Patient to have decreased pain to 1/10 at all times.  4. Patient to demo increase lumbar ROM to 90% pain free  5. Patient to perform daily activities including walking without limitation.    Plan     Perform more seated and standing strengthening    Sarbjit Vargas, PT

## 2020-09-28 ENCOUNTER — OFFICE VISIT (OUTPATIENT)
Dept: PAIN MEDICINE | Facility: CLINIC | Age: 80
End: 2020-09-28
Payer: MEDICARE

## 2020-09-28 VITALS
BODY MASS INDEX: 24.1 KG/M2 | WEIGHT: 141.13 LBS | SYSTOLIC BLOOD PRESSURE: 162 MMHG | DIASTOLIC BLOOD PRESSURE: 94 MMHG | HEART RATE: 78 BPM | HEIGHT: 64 IN

## 2020-09-28 DIAGNOSIS — G89.29 CHRONIC MIDLINE LOW BACK PAIN WITHOUT SCIATICA: ICD-10-CM

## 2020-09-28 DIAGNOSIS — M54.50 CHRONIC MIDLINE LOW BACK PAIN WITHOUT SCIATICA: ICD-10-CM

## 2020-09-28 DIAGNOSIS — M53.3 SACROILIAC JOINT PAIN: Primary | ICD-10-CM

## 2020-09-28 DIAGNOSIS — M47.816 LUMBAR FACET ARTHROPATHY: ICD-10-CM

## 2020-09-28 DIAGNOSIS — M47.816 LUMBAR SPONDYLOSIS: ICD-10-CM

## 2020-09-28 PROCEDURE — 1100F PR PT FALLS ASSESS DOC 2+ FALLS/FALL W/INJURY/YR: ICD-10-PCS | Mod: HCNC,CPTII,S$GLB, | Performed by: ANESTHESIOLOGY

## 2020-09-28 PROCEDURE — 99999 PR PBB SHADOW E&M-EST. PATIENT-LVL V: CPT | Mod: PBBFAC,HCNC,, | Performed by: ANESTHESIOLOGY

## 2020-09-28 PROCEDURE — 1125F PR PAIN SEVERITY QUANTIFIED, PAIN PRESENT: ICD-10-PCS | Mod: HCNC,S$GLB,, | Performed by: ANESTHESIOLOGY

## 2020-09-28 PROCEDURE — 1159F MED LIST DOCD IN RCRD: CPT | Mod: HCNC,S$GLB,, | Performed by: ANESTHESIOLOGY

## 2020-09-28 PROCEDURE — 3077F SYST BP >= 140 MM HG: CPT | Mod: HCNC,CPTII,S$GLB, | Performed by: ANESTHESIOLOGY

## 2020-09-28 PROCEDURE — 1125F AMNT PAIN NOTED PAIN PRSNT: CPT | Mod: HCNC,S$GLB,, | Performed by: ANESTHESIOLOGY

## 2020-09-28 PROCEDURE — 1159F PR MEDICATION LIST DOCUMENTED IN MEDICAL RECORD: ICD-10-PCS | Mod: HCNC,S$GLB,, | Performed by: ANESTHESIOLOGY

## 2020-09-28 PROCEDURE — 3077F PR MOST RECENT SYSTOLIC BLOOD PRESSURE >= 140 MM HG: ICD-10-PCS | Mod: HCNC,CPTII,S$GLB, | Performed by: ANESTHESIOLOGY

## 2020-09-28 PROCEDURE — 3080F PR MOST RECENT DIASTOLIC BLOOD PRESSURE >= 90 MM HG: ICD-10-PCS | Mod: HCNC,CPTII,S$GLB, | Performed by: ANESTHESIOLOGY

## 2020-09-28 PROCEDURE — 3080F DIAST BP >= 90 MM HG: CPT | Mod: HCNC,CPTII,S$GLB, | Performed by: ANESTHESIOLOGY

## 2020-09-28 PROCEDURE — 1100F PTFALLS ASSESS-DOCD GE2>/YR: CPT | Mod: HCNC,CPTII,S$GLB, | Performed by: ANESTHESIOLOGY

## 2020-09-28 PROCEDURE — 99204 PR OFFICE/OUTPT VISIT, NEW, LEVL IV, 45-59 MIN: ICD-10-PCS | Mod: HCNC,S$GLB,, | Performed by: ANESTHESIOLOGY

## 2020-09-28 PROCEDURE — 3288F PR FALLS RISK ASSESSMENT DOCUMENTED: ICD-10-PCS | Mod: HCNC,CPTII,S$GLB, | Performed by: ANESTHESIOLOGY

## 2020-09-28 PROCEDURE — 99204 OFFICE O/P NEW MOD 45 MIN: CPT | Mod: HCNC,S$GLB,, | Performed by: ANESTHESIOLOGY

## 2020-09-28 PROCEDURE — 99999 PR PBB SHADOW E&M-EST. PATIENT-LVL V: ICD-10-PCS | Mod: PBBFAC,HCNC,, | Performed by: ANESTHESIOLOGY

## 2020-09-28 PROCEDURE — 3288F FALL RISK ASSESSMENT DOCD: CPT | Mod: HCNC,CPTII,S$GLB, | Performed by: ANESTHESIOLOGY

## 2020-09-28 NOTE — H&P (VIEW-ONLY)
Chief Pain Complaint:  Back Pain (Lower back pain radiating into buttock)        History of Present Illness:   Violet Swenson is a 80 y.o. female  who is presenting with a chief complaint of Back Pain (Lower back pain radiating into buttock)  . The patient began experiencing this problem insidiously, and the pain has been gradually worsening over the past 6 month(s). The pain is described as throbbing, cramping, aching and heavy and is located in the right buttock . Pain is intermittent and lasts hours. The pain is nonradiating. The patient rates her pain a 8 out of ten and interferes with activities of daily living a 7 out of ten. Pain is exacerbated by getting up from a seated position, and is improved by rest. Patient reports no prior trauma, no prior spinal surgery     Violet Swenson is a 80 y.o. female  who is presenting with a chief complaint of Back Pain (Lower back pain radiating into buttock)  . The patient began experiencing this problem insidiously, and the pain has been gradually worsening over the past 2 month(s). The pain is described as throbbing, cramping, aching and heavy and is located in the bilateral lumbar spine. Pain is intermittent and lasts hours. The pain is nonradiating. The patient rates her pain a 5 out of ten and interferes with activities of daily living a 5 out of ten. Pain is exacerbated by extension of the lumbar spine, and is improved by rest. Patient reports no prior trauma, no prior spinal surgery       - pertinent negatives: No fever, No chills, No weight loss, No bladder dysfunction, No bowel dysfunction, No saddle anesthesia  - pertinent positives: none    - medications, other therapies tried (physical therapy, injections):     >> NSAIDs, Tylenol, Tramadol, Norco and flexeril    >> Has previously undergone Physical Therapy    >> Has NOT previously undergone spinal injection/s      Imaging / Labs / Studies (reviewed on 9/28/2020):      Results for orders  "placed during the hospital encounter of 09/14/20   X-Ray Lumbar Spine AP And Lateral    Narrative EXAMINATION:  XR LUMBAR SPINE AP AND LATERAL    CLINICAL HISTORY:  Lumbago;Low back pain    TECHNIQUE:  AP, lateral and spot images were performed of the lumbar spine.    COMPARISON:  None    FINDINGS:  There is mild levoscoliosis of the lumbar spine.  Vertebral body heights are within normal limits.  No definite spondylolisthesis demonstrated.  There is moderate disc height loss at L2-3 with probable mild disc height loss at L1-2.  Posterior elements appear grossly intact. No acute fractures or subluxations are demonstrated.  The remaining visualized osseous and soft tissue structures demonstrate no appreciable abnormality.      Impression Degenerative findings as above      Electronically signed by: Leroy Awad MD  Date:    09/14/2020  Time:    13:59       Review of Systems:  CONSTITUTIONAL: patient denies any fever, chills, or weight loss  SKIN: patient denies any rash or itching  RESPIRATORY: patient denies having any shortness of breath  GASTROINTESTINAL: patient denies having any diarrhea, constipation, or bowel incontinence  GENITOURINARY: patient denies having any abnormal bladder function    MUSCULOSKELETAL:  - patient complains of the above noted pain/s (see chief pain complaint)    NEUROLOGICAL:   - pain as above  - strength in Lower extremities is intact, BILATERALLY  - sensation in Lower extremities is intact, BILATERALLY  - patient denies any loss of bowel or bladder control      PSYCHIATRIC: patient denies any change in mood    Other:  All other systems reviewed and are negative      Physical Exam:  BP (!) 162/94 (BP Location: Right arm, Patient Position: Sitting, BP Method: Medium (Automatic))   Pulse 78   Ht 5' 4" (1.626 m)   Wt 64 kg (141 lb 1.5 oz)   BMI 24.22 kg/m²  (reviewed on 9/28/2020)  General: Alert and oriented, in no apparent distress.  Gait: normal gait.  Skin: No rashes, No " discoloration, No obvious lesions  HEENT: Normocephalic, atraumatic. Pupils equal and round.  Cardiovascular: Regular rate and rhythm , no significant peripheral edema present  Respiratory: Without audible wheezing, without use of accessory muscles of respiration.    Musculoskeletal:    Cervical Spine    - Pain on flexion of cervical spine Absent  - Spurling's Test:  Absent    - Pain on extension of cervical spine Absent  - TTP over the cervical facet joints Absent  - Cervical facet loading Absent      Lumbar Spine    - Pain on flexion of lumbar spine Absent  - Straight Leg Raise:  Absent    - Pain on extension of lumbar spine Present  - TTP over the lumbar facet joints Present bilateral L5-S1   - Lumbar facet loading Present    -Pain on palpation over the SI joint  Present on right  - CARROLL: Present      Neuro:    Strength:  UE R/L: D: 5/5; B: 5/5; T: 5/5; WF: 5/5; WE: 5/5; IO: 5/5;  LE R/L: HF: 5/5, HE: 5/5, KF: 5/5; KE: 5/5; FE: 5/5; FF: 5/5    Extremity Reflexes: Brisk and symmetric throughout.      Extremity Sensory: Sensation to pinprick and temperature symmetric. Proprioception intact.      Psych:  Mood and affect is appropriate      Assessment:    Violet Swenson is a 80 y.o. year old female who is presenting with     Encounter Diagnoses   Name Primary?    Chronic midline low back pain without sciatica     Lumbar spondylosis     Lumbar facet arthropathy     Sacroiliac joint pain Yes       Plan:    1. Interventional: Schedule patient for right SI joint injection with RN IV sedation.  Consider bilateral L3, 4,5 MBB after.     2. Pharmacologic: Tylenol, Norco PRN.    3. Rehabilitative: Patient already in PT.     4. Diagnostic: Lumbar xray reviewed.    5. Follow up: Post Injection.     20 minutes were spent in this encounter with more than 50% of the time used for counseling and review of the plan.  Imaging / studies reviewed, detailed above.  I discussed in detail the risks, benefits, and  alternatives to any and all potential treatment options.  All questions and concerns were fully addressed today in clinic. Medical decision making moderate.    Thank you for the opportunity to assist in the care of this patient.    Best wishes,    Signed:    Madi Anton MD          Disclaimer:  This note may have been prepared using voice recognition software, it may have not been extensively proofed, as such there could be errors within the text such as sound alike errors.

## 2020-09-28 NOTE — PATIENT INSTRUCTIONS
Pain Management Pre-Procedure Instructions  (also available in your [employer] account)    Patient Name:___Violet Swenson____MRN: [id number] you are scheduled to have the following procedure:__ Joint Injection  _with______Madi Anton MD on: __10/08/2020_____ at: [facility]    You will be contacted the day before your procedure to be given an arrival and procedure time                                                                                                            Day of Procedure   Ensure you have obtained arrival time from the Pain Management department  o We will call 48 hours in advance with your arrival time. Please check any voicemails you may have  o If you arrive past your scheduled procedure time, you may be asked to reschedule your procedure.   For your safety, ensure you have a  with you to remain present throughout your procedure   o If you arrive without a responsible adult to stay with you and drive you home, you may be asked to reschedule your procedure   Take all of your prescribed medications (exceptions noted below) with a small amount of water  o Stop all insulin and blood sugar medication night before and day of , take other medications as prescribed   o [x] Nothing by mouth after midnight the night before your procedure.  It is ok to take your regular medications with a small sip of water.     Wear loose, comfortable clothing    You may wear glasses, dentures, contact lenses and/or hearing aids. Please leave all valuable items at home.   Contact the Pain Management department at [phone] or via [employer] if you are:  o Running a fever above 100 degrees  o Feel ill, have any type of infection, or are taking antibiotics now or have in the past 2 weeks  o Have had any outpatient procedures in the past 2 weeks (colonoscopy, major dental work, etc.)  o If you are allergic to iodine, IVP dye or shellfish.      Contact Information: [phone], ask  to speak to the pain management department with any questions or concerns or send a message via [employer]

## 2020-09-28 NOTE — LETTER
September 28, 2020      Dilshad Rogers MD  [address] [place] [address]           [facility]  [address]  [place] [address]  Phone: [phone]  Fax: [phone]          Patient: Violet Swenson   MR Number: [id number]   YOB: 1940   Date of Visit: 9/28/2020       Dear Dr. Dilshad Rogers:    Thank you for referring Violet Swenson to me for evaluation. Attached you will find relevant portions of my assessment and plan of care.    If you have questions, please do not hesitate to call me. I look forward to following Violet Swenson along with you.    Sincerely,    Madi Anton MD    Enclosure  CC:  No Recipients    If you would like to receive this communication electronically, please contact [email or web address] or [phone] to request more information on [employer] [facility] access.    For providers and/or their staff who would like to refer a patient to [facility], please contact us through our one-stop-shop provider referral line, [facility], at [phone].    If you feel you have received this communication in error or would no longer like to receive these types of communications, please e-mail [email or web address]

## 2020-09-28 NOTE — PROGRESS NOTES
Chief Pain Complaint:  Back Pain (Lower back pain radiating into buttock)        History of Present Illness:   Violet Swenson is a 80 y.o. female  who is presenting with a chief complaint of Back Pain (Lower back pain radiating into buttock)  . The patient began experiencing this problem insidiously, and the pain has been gradually worsening over the past 6 month(s). The pain is described as throbbing, cramping, aching and heavy and is located in the right buttock . Pain is intermittent and lasts hours. The pain is nonradiating. The patient rates her pain a 8 out of ten and interferes with activities of daily living a 7 out of ten. Pain is exacerbated by getting up from a seated position, and is improved by rest. Patient reports no prior trauma, no prior spinal surgery     Vioelt Swenson is a 80 y.o. female  who is presenting with a chief complaint of Back Pain (Lower back pain radiating into buttock)  . The patient began experiencing this problem insidiously, and the pain has been gradually worsening over the past 2 month(s). The pain is described as throbbing, cramping, aching and heavy and is located in the bilateral lumbar spine. Pain is intermittent and lasts hours. The pain is nonradiating. The patient rates her pain a 5 out of ten and interferes with activities of daily living a 5 out of ten. Pain is exacerbated by extension of the lumbar spine, and is improved by rest. Patient reports no prior trauma, no prior spinal surgery       - pertinent negatives: No fever, No chills, No weight loss, No bladder dysfunction, No bowel dysfunction, No saddle anesthesia  - pertinent positives: none    - medications, other therapies tried (physical therapy, injections):     >> NSAIDs, Tylenol, Tramadol, Norco and flexeril    >> Has previously undergone Physical Therapy    >> Has NOT previously undergone spinal injection/s      Imaging / Labs / Studies (reviewed on 9/28/2020):      Results for orders  "placed during the hospital encounter of 09/14/20   X-Ray Lumbar Spine AP And Lateral    Narrative EXAMINATION:  XR LUMBAR SPINE AP AND LATERAL    CLINICAL HISTORY:  Lumbago;Low back pain    TECHNIQUE:  AP, lateral and spot images were performed of the lumbar spine.    COMPARISON:  None    FINDINGS:  There is mild levoscoliosis of the lumbar spine.  Vertebral body heights are within normal limits.  No definite spondylolisthesis demonstrated.  There is moderate disc height loss at L2-3 with probable mild disc height loss at L1-2.  Posterior elements appear grossly intact. No acute fractures or subluxations are demonstrated.  The remaining visualized osseous and soft tissue structures demonstrate no appreciable abnormality.      Impression Degenerative findings as above      Electronically signed by: Leroy Awad MD  Date:    09/14/2020  Time:    13:59       Review of Systems:  CONSTITUTIONAL: patient denies any fever, chills, or weight loss  SKIN: patient denies any rash or itching  RESPIRATORY: patient denies having any shortness of breath  GASTROINTESTINAL: patient denies having any diarrhea, constipation, or bowel incontinence  GENITOURINARY: patient denies having any abnormal bladder function    MUSCULOSKELETAL:  - patient complains of the above noted pain/s (see chief pain complaint)    NEUROLOGICAL:   - pain as above  - strength in Lower extremities is intact, BILATERALLY  - sensation in Lower extremities is intact, BILATERALLY  - patient denies any loss of bowel or bladder control      PSYCHIATRIC: patient denies any change in mood    Other:  All other systems reviewed and are negative      Physical Exam:  BP (!) 162/94 (BP Location: Right arm, Patient Position: Sitting, BP Method: Medium (Automatic))   Pulse 78   Ht 5' 4" (1.626 m)   Wt 64 kg (141 lb 1.5 oz)   BMI 24.22 kg/m²  (reviewed on 9/28/2020)  General: Alert and oriented, in no apparent distress.  Gait: normal gait.  Skin: No rashes, No " discoloration, No obvious lesions  HEENT: Normocephalic, atraumatic. Pupils equal and round.  Cardiovascular: Regular rate and rhythm , no significant peripheral edema present  Respiratory: Without audible wheezing, without use of accessory muscles of respiration.    Musculoskeletal:    Cervical Spine    - Pain on flexion of cervical spine Absent  - Spurling's Test:  Absent    - Pain on extension of cervical spine Absent  - TTP over the cervical facet joints Absent  - Cervical facet loading Absent      Lumbar Spine    - Pain on flexion of lumbar spine Absent  - Straight Leg Raise:  Absent    - Pain on extension of lumbar spine Present  - TTP over the lumbar facet joints Present bilateral L5-S1   - Lumbar facet loading Present    -Pain on palpation over the SI joint  Present on right  - CARROLL: Present      Neuro:    Strength:  UE R/L: D: 5/5; B: 5/5; T: 5/5; WF: 5/5; WE: 5/5; IO: 5/5;  LE R/L: HF: 5/5, HE: 5/5, KF: 5/5; KE: 5/5; FE: 5/5; FF: 5/5    Extremity Reflexes: Brisk and symmetric throughout.      Extremity Sensory: Sensation to pinprick and temperature symmetric. Proprioception intact.      Psych:  Mood and affect is appropriate      Assessment:    Violet Swenson is a 80 y.o. year old female who is presenting with     Encounter Diagnoses   Name Primary?    Chronic midline low back pain without sciatica     Lumbar spondylosis     Lumbar facet arthropathy     Sacroiliac joint pain Yes       Plan:    1. Interventional: Schedule patient for right SI joint injection with RN IV sedation.  Consider bilateral L3, 4,5 MBB after.     2. Pharmacologic: Tylenol, Norco PRN.    3. Rehabilitative: Patient already in PT.     4. Diagnostic: Lumbar xray reviewed.    5. Follow up: Post Injection.     20 minutes were spent in this encounter with more than 50% of the time used for counseling and review of the plan.  Imaging / studies reviewed, detailed above.  I discussed in detail the risks, benefits, and  alternatives to any and all potential treatment options.  All questions and concerns were fully addressed today in clinic. Medical decision making moderate.    Thank you for the opportunity to assist in the care of this patient.    Best wishes,    Signed:    Madi Anton MD          Disclaimer:  This note may have been prepared using voice recognition software, it may have not been extensively proofed, as such there could be errors within the text such as sound alike errors.

## 2020-09-30 ENCOUNTER — CLINICAL SUPPORT (OUTPATIENT)
Dept: REHABILITATION | Facility: HOSPITAL | Age: 80
End: 2020-09-30
Payer: MEDICARE

## 2020-09-30 DIAGNOSIS — R53.1 DECREASED STRENGTH: ICD-10-CM

## 2020-09-30 PROCEDURE — 97110 THERAPEUTIC EXERCISES: CPT | Mod: HCNC

## 2020-09-30 PROCEDURE — 97140 MANUAL THERAPY 1/> REGIONS: CPT | Mod: HCNC

## 2020-09-30 NOTE — PROGRESS NOTES
Physical Therapy Treatment Note     Name: Violet Mcnally [facility]  Clinic Number: [id number]    Therapy Diagnosis:   Encounter Diagnosis   Name Primary?    Decreased strength      Physician: Dilshad Rogers,*    Visit Date: 9/30/2020    Physician Orders: PT Eval and Treat   Medical Diagnosis from Referral: Chronic midline low back pain without sciatica  Evaluation Date: 9/16/2020  Authorization Period Expiration: 9/14/21  Plan of Care Expiration: 10/16/20  Visit # / Visits authorized: 1/ 1    Time In: 11:05 am  Time Out: 12:00 pm  Total Billable Time: 45 minutes    Precautions: Fall and pacemaker    Subjective     Pt reports: that her back is stiff, but its feeling better since last visit.   She was compliant with home exercise program.  Response to previous treatment: good  Functional change: none    Pain: 5/10  Location: right lower back    Objective     Violet received therapeutic exercises to develop strength, flexibility and posture for 35 minutes including:  Nustep x 5 min  Seated trunk FL against ball  Supine PPT  Supine PPT + march  LTR  Bridges w/ PPT  Prone hip extension w/ pillow underneath abdomen   Cat Cows  Standing rows against  Red  TB  Standing shoulder extensions bilaterally red TB    Not today:   Standing isometric walk out with paloff press    Violet received the following manual therapy techniques: Joint mobilizations and Soft tissue Mobilization were applied to the: lumbar spine for 10 minutes, including:  Sideglides lumbar Grade 2  STM to lumbar paraspinals  STM to R sacrum    Violet received hot pack for 10 minutes to lumbar spine.    Home Exercises Provided and Patient Education Provided     Education provided:   - Progression of rehab, exercise to decrease pain while    Written Home Exercises Provided: yes.  Exercises were reviewed and Violet was able to demonstrate them prior to the end of the session.  Violet demonstrated good  understanding of the education provided.      See EMR under Patient Instructions for exercises provided 9/25/2020.    Assessment   Pt tolerated therapy well today. She was able to increase LE load without any increase in her symptoms. She continues to have trouble with extension, which was evident during her cat cows. Pts pain levels decreased post manual therapy and hot pack. She continues to show decrease tolerance to trunk extension, and maintaining PPT.      Violet is progressing well towards her goals.   Pt prognosis is Good.     Pt will continue to benefit from skilled outpatient physical therapy to address the deficits listed in the problem list box on initial evaluation, provide pt/family education and to maximize pt's level of independence in the home and community environment.     Pt's spiritual, cultural and educational needs considered and pt agreeable to plan of care and goals.     Anticipated barriers to physical therapy: hronicity of symptoms, overall very decreased activity/strength    Goals:     Short Term Goals: In 4 weeks   1.I with HEP  2.Pt to increase lumbar ROM to 75% pain free  3. Pt to increase BLE strength by 1/2 grade  4.Pt to have pain less than 3/10 at all times.     Long Term Goals: In 8 weeks  1.Pt to score less than 20% impaired on the foto  2. Patient to demo increase in LE strength by 1 grade  3. Patient to have decreased pain to 1/10 at all times.  4. Patient to demo increase lumbar ROM to 90% pain free  5. Patient to perform daily activities including walking without limitation.    Plan     Perform more seated and standing strengthening    Pam Garcia, SPT

## 2020-10-01 NOTE — PROGRESS NOTES
Physical Therapy Assistant Treatment Note     [place] [facility] Number: [id number]    Therapy Diagnosis:   No diagnosis found.  Physician: Dilshad Rogers,*    Visit Date: 10/2/2020    Physician Orders: PT Eval and Treat   Medical Diagnosis from Referral: Chronic midline low back pain without sciatica  Evaluation Date: 9/16/2020  Authorization Period Expiration: 09/23/2021  Plan of Care Expiration: 10/16/20  Visit # / Visits authorized: 2/20    Time In: 10:30 am  Time Out: 11:15 am  Total Billable Time: 45 minutes    Precautions: Fall and pacemaker    Subjective     Pt reports: that her back is stiff, but its feeling better since last visit. ***  She was compliant with home exercise program.  Response to previous treatment: good ***  Functional change: none    Pain: 5/10 ***  Location: right lower back    Objective     Violet received therapeutic exercises to develop strength, flexibility and posture for 35*** minutes including:  Nustep x 5 min  Seated trunk FL against ball  Supine PPT  Supine PPT + march  LTR  Bridges w/ PPT  Prone hip extension w/ pillow underneath abdomen   Cat Cows  Standing rows against  Red  TB  Standing shoulder extensions bilaterally red TB    Not today:   Standing isometric walk out with paloff press    Violet received the following manual therapy techniques: Joint mobilizations and Soft tissue Mobilization were applied to the: lumbar spine for 10*** minutes, including:  Sideglides lumbar Grade 2    STM to lumbar paraspinals  STM to R sacrum    Violet received hot pack for 10*** minutes to lumbar spine.    Home Exercises Provided and Patient Education Provided     Education provided:   - Progression of rehab, exercise to decrease pain while ***    Written Home Exercises Provided: yes.  Exercises were reviewed and Violet was able to demonstrate them prior to the end of the session.  Violet demonstrated good  understanding of the education provided.     See  EMR under Patient Instructions for exercises provided 9/25/2020.    Assessment   Pt tolerated therapy well today. She was able to increase LE load without any increase in her symptoms. She continues to have trouble with extension, which was evident during her cat cows. Pts pain levels decreased post manual therapy and hot pack. She continues to show decrease tolerance to trunk extension, and maintaining PPT.      ***    Violet is progressing well towards her goals.   Pt prognosis is Good.     Pt will continue to benefit from skilled outpatient physical therapy to address the deficits listed in the problem list box on initial evaluation, provide pt/family education and to maximize pt's level of independence in the home and community environment.     Pt's spiritual, cultural and educational needs considered and pt agreeable to plan of care and goals.     Anticipated barriers to physical therapy: hronicity of symptoms, overall very decreased activity/strength    Goals:  ***  Short Term Goals: In 4 weeks   1.I with HEP  2.Pt to increase lumbar ROM to 75% pain free  3. Pt to increase BLE strength by 1/2 grade  4.Pt to have pain less than 3/10 at all times.     Long Term Goals: In 8 weeks  1.Pt to score less than 20% impaired on the foto  2. Patient to demo increase in LE strength by 1 grade  3. Patient to have decreased pain to 1/10 at all times.  4. Patient to demo increase lumbar ROM to 90% pain free  5. Patient to perform daily activities including walking without limitation.    Plan     Perform more seated and standing strengthening    Nancy Melton, LES

## 2020-10-01 NOTE — PRE-PROCEDURE INSTRUCTIONS
Attempted to PAT patient for procedure on 10.8 with Dr. ventura . No answer. M with return phone number. No return call at this time.

## 2020-10-02 ENCOUNTER — CLINICAL SUPPORT (OUTPATIENT)
Dept: REHABILITATION | Facility: HOSPITAL | Age: 80
End: 2020-10-02
Payer: MEDICARE

## 2020-10-02 DIAGNOSIS — M54.50 CHRONIC MIDLINE LOW BACK PAIN WITHOUT SCIATICA: Primary | ICD-10-CM

## 2020-10-02 DIAGNOSIS — E03.9 HYPOTHYROIDISM (ACQUIRED): Chronic | ICD-10-CM

## 2020-10-02 DIAGNOSIS — R53.1 DECREASED STRENGTH: ICD-10-CM

## 2020-10-02 DIAGNOSIS — G89.29 CHRONIC MIDLINE LOW BACK PAIN WITHOUT SCIATICA: Primary | ICD-10-CM

## 2020-10-02 PROCEDURE — 97110 THERAPEUTIC EXERCISES: CPT | Mod: HCNC,CQ

## 2020-10-02 PROCEDURE — 97140 MANUAL THERAPY 1/> REGIONS: CPT | Mod: HCNC,CQ

## 2020-10-02 RX ORDER — LEVOTHYROXINE SODIUM 75 UG/1
TABLET ORAL
Qty: 90 TABLET | Refills: 3 | Status: SHIPPED | OUTPATIENT
Start: 2020-10-02 | End: 2021-10-04

## 2020-10-02 NOTE — PROGRESS NOTES
Physical Therapist Assistant Treatment Note     Name: Violet Mcnally [facility] [place]  Clinic Number: [id number]    Therapy Diagnosis:   Encounter Diagnoses   Name Primary?    Decreased strength     Chronic midline low back pain without sciatica Yes     Physician: Dilshad Rogers,*    Visit Date: 10/2/2020    Physician Orders: PT Eval and Treat   Medical Diagnosis from Referral: Chronic midline low back pain without sciatica  Evaluation Date: 9/16/2020  Authorization Period Expiration: 9/14/21  Plan of Care Expiration: 10/16/20  Visit # / Visits authorized: 4/20    Time In: 10:30  Time Out: 11:20  Total Billable Time: 40 minutes    Precautions: Fall and pacemaker    Subjective     Pt reports: that she feels worse than when she started therapy  She was compliant with home exercise program.  Response to previous treatment: increased pain  Functional change: na at this time    Pain: 9/10  Location: low back R>L    Objective     Violet received therapeutic exercises to develop strength, flexibility and posture for 28 minutes including:  Nustep x 5 min  Seated trunk FL   Supine PPT  Supine PPT + march  LTR on ball  DKTC on ball  Bridges w/ PPT  SAQ  Prone hip extension w/ pillow underneath abdomen   Prone heel press w/glute isometrics  Seated TvA isometrics  Seated rows against  Red  TB  Standing shoulder extensions bilaterally red TB-not today      Violet received the following manual therapy techniques: Joint mobilizations and Soft tissue Mobilization were applied to the: lumbar spine for 12 minutes, including:  STM to lumbar paraspinals  STM to R glutes  Hamstring stretches    Violet received hot pack for 15 minutes to lumbar spine.    Home Exercises Provided and Patient Education Provided     Education provided:   - Progression of rehab, exercise to decrease pain while    Written Home Exercises Provided: yes.  Exercises were reviewed and Violet was able to demonstrate them prior to the end of the session.   Violet demonstrated good  understanding of the education provided.     See EMR under Patient Instructions for exercises provided 9/25/2020.    Assessment   Violet presents today with increased complaints of back pain and feeling frustrated but willing to continue. She did however state she would not do any activities on her knees as this caused her pain from her previous total knee replacements. Today, her exercises were on the mat and will progress to more standing once she is more comfortable and able to tolerate progressive activities. She is very low tone globally, especially in her glutes . She appears to be inconsistent with her complaints of pain at this time. Violet did not voice any complaints of increased pain during or at the end of the session.   Violet is progressing well towards her goals.   Pt prognosis is Good.     Pt will continue to benefit from skilled outpatient physical therapy to address the deficits listed in the problem list box on initial evaluation, provide pt/family education and to maximize pt's level of independence in the home and community environment.     Pt's spiritual, cultural and educational needs considered and pt agreeable to plan of care and goals.     Anticipated barriers to physical therapy: hronicity of symptoms, overall very decreased activity/strength    Goals:     Short Term Goals: In 4 weeks   1.I with HEP  2.Pt to increase lumbar ROM to 75% pain free  3. Pt to increase BLE strength by 1/2 grade  4.Pt to have pain less than 3/10 at all times.     Long Term Goals: In 8 weeks  1.Pt to score less than 20% impaired on the foto  2. Patient to demo increase in LE strength by 1 grade  3. Patient to have decreased pain to 1/10 at all times.  4. Patient to demo increase lumbar ROM to 90% pain free  5. Patient to perform daily activities including walking without limitation.    Plan     Perform more seated and standing strengthening    Toma Mckeon PTA

## 2020-10-06 NOTE — PRE-PROCEDURE INSTRUCTIONS
Spoke with patient regarding procedure scheduled on 10.8    Arrival time 0820    Has patient been sick with fever or on antibiotics within the last 7 days? No    Has patient received a vaccination within the last 7 days? no    Has the patient stopped all medications as directed? Na    Does patient have a pacemaker and or defibrillator? pacemaker    Does the patient have a ride to and from procedure and someone reliable to remain with patient?  Bill    Is the patient diabetic? no    Does the patient have sleep apnea? Or use O2 at home? No and no     Is the patient receiving sedation? yes    Is the patient instructed to remain NPO beginning at midnight the night before their procedure? yes    Procedure location confirmed with patient? Yes    Covid- Denies signs/symptoms. Instructed to notify PAT/MD if any changes.

## 2020-10-07 ENCOUNTER — CLINICAL SUPPORT (OUTPATIENT)
Dept: REHABILITATION | Facility: HOSPITAL | Age: 80
End: 2020-10-07
Payer: MEDICARE

## 2020-10-07 DIAGNOSIS — R53.1 DECREASED STRENGTH: ICD-10-CM

## 2020-10-07 PROCEDURE — 97110 THERAPEUTIC EXERCISES: CPT | Mod: HCNC

## 2020-10-07 PROCEDURE — 97140 MANUAL THERAPY 1/> REGIONS: CPT | Mod: HCNC

## 2020-10-07 NOTE — PROGRESS NOTES
Physical Therapist Treatment Note     Name: Violet Mcnally [facility] [place]  Clinic Number: [id number]    Therapy Diagnosis:   Encounter Diagnosis   Name Primary?    Decreased strength      Physician: Dilshad Rogers,*    Visit Date: 10/7/2020    Physician Orders: PT Eval and Treat   Medical Diagnosis from Referral: Chronic midline low back pain without sciatica  Evaluation Date: 9/16/2020  Authorization Period Expiration: 9/14/21  Plan of Care Expiration: 10/16/20  Visit # / Visits authorized: 5/20    Time In: 11:15  Time Out: 12:10  Total Billable Time: 45 minutes    Precautions: Fall and pacemaker    Subjective     Pt reports: that her back is feeling better, but her neck is bothering her more today.  She was compliant with home exercise program.  Response to previous treatment: good  Functional change: improvement in lower back pain    Pain: 3/10  Location: R sided neck    Objective     Violet received therapeutic exercises to develop strength, flexibility and posture for 30 minutes including:  Nustep x 5 min  SKTC holds  Low bridge 5 second holds  LTR  Supine chin tuck holds  Supine chin tuck + rotation  Seated lat pulldowns 40# 3 second holds    Violet received the following manual therapy techniques: Joint mobilizations and Soft tissue Mobilization were applied to the: lumbar spine for 15 minutes, including:  STM to R cervical paraspinals  Suboccipital release  Grade 2 cervical sideglides    Violet received hot pack for 10 minutes to lumbar spin/cervical spine.    Home Exercises Provided and Patient Education Provided     Education provided:   - Progression of rehab, exercise to decrease pain while    Written Home Exercises Provided: yes.  Exercises were reviewed and Violet was able to demonstrate them prior to the end of the session.  Violet demonstrated good  understanding of the education provided.     See EMR under Patient Instructions for exercises provided 9/25/2020.    Assessment   Patient  demonstrated good tolerance to strengthening and gentle mobility work for her neck. Focused on neck this treatment since patient was having more pain here, but will focus on low back pain again next treatment and overall strengthening.    Violet is progressing well towards her goals.   Pt prognosis is Good.     Pt will continue to benefit from skilled outpatient physical therapy to address the deficits listed in the problem list box on initial evaluation, provide pt/family education and to maximize pt's level of independence in the home and community environment.     Pt's spiritual, cultural and educational needs considered and pt agreeable to plan of care and goals.     Anticipated barriers to physical therapy: hronicity of symptoms, overall very decreased activity/strength    Goals:     Short Term Goals: In 4 weeks   1.I with HEP  2.Pt to increase lumbar ROM to 75% pain free  3. Pt to increase BLE strength by 1/2 grade  4.Pt to have pain less than 3/10 at all times.     Long Term Goals: In 8 weeks  1.Pt to score less than 20% impaired on the foto  2. Patient to demo increase in LE strength by 1 grade  3. Patient to have decreased pain to 1/10 at all times.  4. Patient to demo increase lumbar ROM to 90% pain free  5. Patient to perform daily activities including walking without limitation.    Plan     Perform more seated and standing strengthening    Sarbjit Vargas, PT

## 2020-10-08 ENCOUNTER — HOSPITAL ENCOUNTER (OUTPATIENT)
Facility: HOSPITAL | Age: 80
Discharge: HOME | End: 2020-10-08
Attending: ANESTHESIOLOGY | Admitting: ANESTHESIOLOGY
Payer: MEDICARE

## 2020-10-08 VITALS
OXYGEN SATURATION: 99 % | WEIGHT: 143.31 LBS | RESPIRATION RATE: 16 BRPM | DIASTOLIC BLOOD PRESSURE: 77 MMHG | BODY MASS INDEX: 24.46 KG/M2 | HEART RATE: 61 BPM | TEMPERATURE: 97 F | HEIGHT: 64 IN | SYSTOLIC BLOOD PRESSURE: 165 MMHG

## 2020-10-08 DIAGNOSIS — M53.3 SACROILIAC JOINT DYSFUNCTION: ICD-10-CM

## 2020-10-08 PROCEDURE — 25000003 PHARM REV CODE 250: Mod: HCNC | Performed by: ANESTHESIOLOGY

## 2020-10-08 PROCEDURE — 63600175 PHARM REV CODE 636 W HCPCS: Mod: HCNC | Performed by: ANESTHESIOLOGY

## 2020-10-08 PROCEDURE — 27096 INJECT SACROILIAC JOINT: CPT | Mod: HCNC | Performed by: ANESTHESIOLOGY

## 2020-10-08 PROCEDURE — 27096 INJECT SACROILIAC JOINT: CPT | Mod: HCNC,RT,, | Performed by: ANESTHESIOLOGY

## 2020-10-08 PROCEDURE — 25500020 PHARM REV CODE 255: Mod: HCNC | Performed by: ANESTHESIOLOGY

## 2020-10-08 PROCEDURE — 27096 PR INJECTION,SACROILIAC JOINT: ICD-10-PCS | Mod: HCNC,RT,, | Performed by: ANESTHESIOLOGY

## 2020-10-08 RX ORDER — LIDOCAINE HYDROCHLORIDE 20 MG/ML
INJECTION, SOLUTION EPIDURAL; INFILTRATION; INTRACAUDAL; PERINEURAL
Status: DISCONTINUED | OUTPATIENT
Start: 2020-10-08 | End: 2020-10-08 | Stop reason: HOSPADM

## 2020-10-08 RX ORDER — METHYLPREDNISOLONE ACETATE 40 MG/ML
INJECTION, SUSPENSION INTRA-ARTICULAR; INTRALESIONAL; INTRAMUSCULAR; SOFT TISSUE
Status: DISCONTINUED | OUTPATIENT
Start: 2020-10-08 | End: 2020-10-08 | Stop reason: HOSPADM

## 2020-10-08 RX ORDER — MIDAZOLAM HYDROCHLORIDE 1 MG/ML
INJECTION, SOLUTION INTRAMUSCULAR; INTRAVENOUS
Status: DISCONTINUED | OUTPATIENT
Start: 2020-10-08 | End: 2020-10-08 | Stop reason: HOSPADM

## 2020-10-08 NOTE — DISCHARGE INSTRUCTIONS

## 2020-10-08 NOTE — PLAN OF CARE
Pt discharged home, awake, alert, oriented x's 4,  denies any pain, no apparent distress noted. All questions and concerns addressed and answered, pt verbalizes understanding of discharge process, pt meets discharge criteria and is being discharged to car via wheelchair.

## 2020-10-08 NOTE — DISCHARGE SUMMARY
[facility]  Discharge Note       Description of Procedure: Right Sacroiliac Joint Injection under Fluoroscopic Guidance    Procedure Date: 10/8/2020    Admit Date: 10/8/2020  Discharge Date: 10/8/2020     Attending Physician: Madi Anton   Discharge Provider: Madi Anton    Preoperative Diagnosis: Sacroiliitis     Postoperative Diagnosis: as above, same as preoperative diagnosis    Discharged Condition: Stable    Hospital Course: Patient was admitted for an outpatient procedure. The procedure was tolerated well with no complications.    Final Diagnoses: Same as principal problem.    Disposition: Home, self-care.    Follow up/Patient Instructions:  Follow-up in clinic in 2-3 weeks.    Medications: No medications were prescribed today. The patient was advised to resume normal medication regimen without change.  Specific information was provided regarding restarting any anticoagulant/s.    Discharge Procedure Orders (must include Diet, Follow-up, Activity):  Light activity for the remainder of the day, resume normal activity tomorrow. Resume normal diet. Follow-up in clinic in 2-3 weeks.

## 2020-10-08 NOTE — PLAN OF CARE
Pt drowsy but arousable, o x's 4, denies any pain/discomfort, what to expect during recovery discussed with pt at bedside. Pt verbalized understanding of all post op instructions. All questions and concerns addressed and answered, will continue to monitor pt until discharged.

## 2020-10-08 NOTE — OP NOTE
PROCEDURE: Sacroiliac joint injection under fluoroscopic guidance     SIDE: right      PROCEDURE DATE: 10/8/2020    PREOPERATIVE DIAGNOSIS: Sacroiliitis  POSTOPERATIVE DIAGNOSIS: Sacroiliitis    PROVIDER: Madi Anton MD  Assistant(s): None    ANESTHESIA: Local, IV Sedation    >> 1 mg of VERSED    >> 0 mcg of FENTANYL     INDICATION: The patient has a history of pain due to sacroiliitis unresponsive to conservative treatments. Fluoroscopy was used to optimize visualization of needle placement and to maximize safety.     PROCEDURE DESCRIPTION: The patient was seen and identified in the preoperative area. Risks, benefits, complications, and alternatives were discussed with the patient. The patient agreed to proceed with the procedure and signed the consent. The site and side of the procedure was identified and marked. An IV was started.     The patient was taken to the procedural suite. The patient was positioned in prone orientation on procedure table. A time out was performed prior to any intervention. The procedure, site, side, and allergies were stated and agreed to by all present. The lumbosacral area was widely prepped with ChloraPrep. The procedural site was draped in usual sterile fashion. Vital signs were closely monitored throughout this procedure. Conscious sedation was used for this procedure to decrease patient anxiety.    The fluoroscopic camera was placed in contralateral oblique view and was adjusted until the anterior and posterior joint margins of the targeted sacroiliac joint aligned in linear array. The lower pole of the joint was identified, marked, and localized with 1% Lidocaine. A 25 gauge 3.5 inch spinal needle was introduced and advanced to the joint under fluoroscopic guidance. The joint space was entered and after negative aspiration, 2 mL of injectate was posited into the joint space. The needle was then withdrawn outside of the joint space and after negative aspiration 1 mL of solution  was injected outside of the joint space. The injectant solution used was comprised of 2 mL of 1% PF Lidocaine and 1 mL of Methylprednisolone (40 mg/mL). This techniques was performed for the above noted joint/s.    Description of Findings: Not applicable    Prosthetic devices, grafts, tissues, or devices implanted: None    Specimen Removed: No    Estimated Blood Loss: minimal    COMPLICATIONS: None    DISPOSITION / PLANS: The patient was transferred to the recovery area in a stable condition for observation. The patient was reexamined prior to discharge. There was no evidence of acute neurologic injury following the procedure.  Patient was discharged from the recovery room after meeting discharge criteria. Home discharge instructions were given to the patient by the staff.

## 2020-10-21 ENCOUNTER — TELEPHONE (OUTPATIENT)
Dept: REHABILITATION | Facility: HOSPITAL | Age: 80
End: 2020-10-21

## 2020-10-27 ENCOUNTER — PES CALL (OUTPATIENT)
Dept: ADMINISTRATIVE | Facility: CLINIC | Age: 80
End: 2020-10-27

## 2020-11-03 ENCOUNTER — PES CALL (OUTPATIENT)
Dept: ADMINISTRATIVE | Facility: CLINIC | Age: 80
End: 2020-11-03

## 2020-11-03 ENCOUNTER — LAB VISIT (OUTPATIENT)
Dept: LAB | Facility: HOSPITAL | Age: 80
End: 2020-11-03
Attending: INTERNAL MEDICINE
Payer: MEDICARE

## 2020-11-03 DIAGNOSIS — M1A.09X0 IDIOPATHIC CHRONIC GOUT OF MULTIPLE SITES WITHOUT TOPHUS: ICD-10-CM

## 2020-11-03 DIAGNOSIS — M81.0 AGE-RELATED OSTEOPOROSIS WITHOUT CURRENT PATHOLOGICAL FRACTURE: ICD-10-CM

## 2020-11-03 LAB
ALBUMIN SERPL BCP-MCNC: 3.4 G/DL (ref 3.5–5.2)
ALP SERPL-CCNC: 69 U/L (ref 55–135)
ALT SERPL W/O P-5'-P-CCNC: 25 U/L (ref 10–44)
ANION GAP SERPL CALC-SCNC: 6 MMOL/L (ref 8–16)
AST SERPL-CCNC: 25 U/L (ref 10–40)
BILIRUB SERPL-MCNC: 0.3 MG/DL (ref 0.1–1)
BUN SERPL-MCNC: 34 MG/DL (ref 8–23)
CALCIUM SERPL-MCNC: 8.7 MG/DL (ref 8.7–10.5)
CHLORIDE SERPL-SCNC: 112 MMOL/L (ref 95–110)
CO2 SERPL-SCNC: 22 MMOL/L (ref 23–29)
CREAT SERPL-MCNC: 1.5 MG/DL (ref 0.5–1.4)
EST. GFR  (AFRICAN AMERICAN): 38 ML/MIN/1.73 M^2
EST. GFR  (NON AFRICAN AMERICAN): 33 ML/MIN/1.73 M^2
GLUCOSE SERPL-MCNC: 70 MG/DL (ref 70–110)
POTASSIUM SERPL-SCNC: 5.5 MMOL/L (ref 3.5–5.1)
PROT SERPL-MCNC: 6.7 G/DL (ref 6–8.4)
SODIUM SERPL-SCNC: 140 MMOL/L (ref 136–145)

## 2020-11-03 PROCEDURE — 36415 COLL VENOUS BLD VENIPUNCTURE: CPT | Mod: HCNC

## 2020-11-03 PROCEDURE — 80053 COMPREHEN METABOLIC PANEL: CPT | Mod: HCNC

## 2020-11-05 ENCOUNTER — TELEPHONE (OUTPATIENT)
Dept: PAIN MEDICINE | Facility: CLINIC | Age: 80
End: 2020-11-05

## 2020-11-05 NOTE — TELEPHONE ENCOUNTER
Attempted to call patient regarding appointment that is scheduled with Dr. Anton tomorrow 11/06/2020 . We will need to r/s appointment due to Dr. Anton being out of office due to a family emergency. Left v/m for patient to call me back to discuss options.

## 2020-11-11 ENCOUNTER — TELEPHONE (OUTPATIENT)
Dept: REHABILITATION | Facility: HOSPITAL | Age: 80
End: 2020-11-11

## 2020-11-16 ENCOUNTER — PATIENT OUTREACH (OUTPATIENT)
Dept: ADMINISTRATIVE | Facility: HOSPITAL | Age: 80
End: 2020-11-16

## 2020-11-16 ENCOUNTER — HOSPITAL ENCOUNTER (OUTPATIENT)
Facility: HOSPITAL | Age: 80
Discharge: HOME | End: 2020-11-17
Attending: EMERGENCY MEDICINE | Admitting: INTERNAL MEDICINE
Payer: MEDICARE

## 2020-11-16 DIAGNOSIS — I25.10 CORONARY ARTERY DISEASE WITHOUT ANGINA PECTORIS, UNSPECIFIED VESSEL OR LESION TYPE, UNSPECIFIED WHETHER NATIVE OR TRANSPLANTED HEART: ICD-10-CM

## 2020-11-16 DIAGNOSIS — R53.1 WEAKNESS: ICD-10-CM

## 2020-11-16 DIAGNOSIS — I63.9 STROKE: ICD-10-CM

## 2020-11-16 DIAGNOSIS — I63.9 BRAINSTEM STROKE: Primary | ICD-10-CM

## 2020-11-16 PROBLEM — I50.32 CHRONIC DIASTOLIC HEART FAILURE: Status: ACTIVE | Noted: 2020-11-16

## 2020-11-16 LAB
ALBUMIN SERPL BCP-MCNC: 2.8 G/DL (ref 3.5–5.2)
ALP SERPL-CCNC: 64 U/L (ref 55–135)
ALT SERPL W/O P-5'-P-CCNC: 28 U/L (ref 10–44)
ANION GAP SERPL CALC-SCNC: 5 MMOL/L (ref 8–16)
AORTIC ROOT ANNULUS: 3.26 CM
ASCENDING AORTA: 3.13 CM
AST SERPL-CCNC: 45 U/L (ref 10–40)
AV INDEX (PROSTH): 0.53
AV MEAN GRADIENT: 7 MMHG
AV PEAK GRADIENT: 14 MMHG
AV VALVE AREA: 1.63 CM2
AV VELOCITY RATIO: 0.43
BASOPHILS # BLD AUTO: 0.03 K/UL (ref 0–0.2)
BASOPHILS NFR BLD: 0.4 % (ref 0–1.9)
BILIRUB SERPL-MCNC: 0.2 MG/DL (ref 0.1–1)
BNP SERPL-MCNC: 880 PG/ML (ref 0–99)
BSA FOR ECHO PROCEDURE: 1.72 M2
BUN SERPL-MCNC: 21 MG/DL (ref 8–23)
CALCIUM SERPL-MCNC: 7.8 MG/DL (ref 8.7–10.5)
CHLORIDE SERPL-SCNC: 113 MMOL/L (ref 95–110)
CO2 SERPL-SCNC: 21 MMOL/L (ref 23–29)
CREAT SERPL-MCNC: 1.5 MG/DL (ref 0.5–1.4)
CV ECHO LV RWT: 0.54 CM
DIFFERENTIAL METHOD: ABNORMAL
DOP CALC AO PEAK VEL: 1.89 M/S
DOP CALC AO VTI: 39.2 CM
DOP CALC LVOT AREA: 3.1 CM2
DOP CALC LVOT DIAMETER: 1.99 CM
DOP CALC LVOT PEAK VEL: 0.81 M/S
DOP CALC LVOT STROKE VOLUME: 63.98 CM3
DOP CALC RVOT PEAK VEL: 0.94 M/S
DOP CALC RVOT VTI: 19.7 CM
DOP CALCLVOT PEAK VEL VTI: 20.58 CM
E WAVE DECELERATION TIME: 187.02 MSEC
E/A RATIO: 1.23
E/E' RATIO: 14.5 M/S
ECHO LV POSTERIOR WALL: 1.27 CM (ref 0.6–1.1)
EOSINOPHIL # BLD AUTO: 0.1 K/UL (ref 0–0.5)
EOSINOPHIL NFR BLD: 1 % (ref 0–8)
ERYTHROCYTE [DISTWIDTH] IN BLOOD BY AUTOMATED COUNT: 15.4 % (ref 11.5–14.5)
EST. GFR  (AFRICAN AMERICAN): 38 ML/MIN/1.73 M^2
EST. GFR  (NON AFRICAN AMERICAN): 33 ML/MIN/1.73 M^2
FRACTIONAL SHORTENING: 29 % (ref 28–44)
GLUCOSE SERPL-MCNC: 88 MG/DL (ref 70–110)
HCT VFR BLD AUTO: 28.6 % (ref 37–48.5)
HGB BLD-MCNC: 8.9 G/DL (ref 12–16)
IMM GRANULOCYTES # BLD AUTO: 0.08 K/UL (ref 0–0.04)
IMM GRANULOCYTES NFR BLD AUTO: 1 % (ref 0–0.5)
INR PPP: 1 (ref 0.8–1.2)
INTERVENTRICULAR SEPTUM: 1.62 CM (ref 0.6–1.1)
IVRT: 79.92 MSEC
LA MAJOR: 4.59 CM
LA MINOR: 4.52 CM
LEFT ATRIUM SIZE: 4.59 CM
LEFT INTERNAL DIMENSION IN SYSTOLE: 3.34 CM (ref 2.1–4)
LEFT VENTRICLE DIASTOLIC VOLUME INDEX: 59.84 ML/M2
LEFT VENTRICLE DIASTOLIC VOLUME: 102.09 ML
LEFT VENTRICLE MASS INDEX: 162 G/M2
LEFT VENTRICLE SYSTOLIC VOLUME INDEX: 26.6 ML/M2
LEFT VENTRICLE SYSTOLIC VOLUME: 45.3 ML
LEFT VENTRICULAR INTERNAL DIMENSION IN DIASTOLE: 4.69 CM (ref 3.5–6)
LEFT VENTRICULAR MASS: 277.12 G
LV LATERAL E/E' RATIO: 10.55 M/S
LV SEPTAL E/E' RATIO: 23.2 M/S
LYMPHOCYTES # BLD AUTO: 2.6 K/UL (ref 1–4.8)
LYMPHOCYTES NFR BLD: 32.3 % (ref 18–48)
MCH RBC QN AUTO: 32.4 PG (ref 27–31)
MCHC RBC AUTO-ENTMCNC: 31.1 G/DL (ref 32–36)
MCV RBC AUTO: 104 FL (ref 82–98)
MONOCYTES # BLD AUTO: 0.4 K/UL (ref 0.3–1)
MONOCYTES NFR BLD: 5.2 % (ref 4–15)
MV PEAK A VEL: 0.94 M/S
MV PEAK E VEL: 1.16 M/S
MV STENOSIS PRESSURE HALF TIME: 54.23 MS
MV VALVE AREA P 1/2 METHOD: 4.06 CM2
NEUTROPHILS # BLD AUTO: 4.9 K/UL (ref 1.8–7.7)
NEUTROPHILS NFR BLD: 60.1 % (ref 38–73)
NRBC BLD-RTO: 0 /100 WBC
PISA MRMAX VEL: 0.05 M/S
PISA TR MAX VEL: 2.96 M/S
PLATELET # BLD AUTO: 372 K/UL (ref 150–350)
PMV BLD AUTO: 9.7 FL (ref 9.2–12.9)
POTASSIUM SERPL-SCNC: 5.1 MMOL/L (ref 3.5–5.1)
PROT SERPL-MCNC: 6 G/DL (ref 6–8.4)
PROTHROMBIN TIME: 10.5 SEC (ref 9–12.5)
PV MEAN GRADIENT: 1.9 MMHG
RA MAJOR: 3.66 CM
RA PRESSURE: 3 MMHG
RBC # BLD AUTO: 2.75 M/UL (ref 4–5.4)
SARS-COV-2 RDRP RESP QL NAA+PROBE: NEGATIVE
SINUS: 3.41 CM
SODIUM SERPL-SCNC: 139 MMOL/L (ref 136–145)
STJ: 3.34 CM
T4 FREE SERPL-MCNC: 0.72 NG/DL (ref 0.71–1.51)
TDI LATERAL: 0.11 M/S
TDI SEPTAL: 0.05 M/S
TDI: 0.08 M/S
TR MAX PG: 35 MMHG
TRICUSPID ANNULAR PLANE SYSTOLIC EXCURSION: 1.99 CM
TROPONIN I SERPL DL<=0.01 NG/ML-MCNC: 0.01 NG/ML (ref 0–0.03)
TROPONIN I SERPL DL<=0.01 NG/ML-MCNC: 0.02 NG/ML (ref 0–0.03)
TROPONIN I SERPL DL<=0.01 NG/ML-MCNC: 0.02 NG/ML (ref 0–0.03)
TSH SERPL DL<=0.005 MIU/L-ACNC: 4.09 UIU/ML (ref 0.4–4)
TV REST PULMONARY ARTERY PRESSURE: 38 MMHG
WBC # BLD AUTO: 8.08 K/UL (ref 3.9–12.7)

## 2020-11-16 PROCEDURE — 63600175 PHARM REV CODE 636 W HCPCS: Mod: HCNC | Performed by: NURSE PRACTITIONER

## 2020-11-16 PROCEDURE — 84484 ASSAY OF TROPONIN QUANT: CPT | Mod: HCNC

## 2020-11-16 PROCEDURE — G0378 HOSPITAL OBSERVATION PER HR: HCPCS | Mod: HCNC,CS

## 2020-11-16 PROCEDURE — 80053 COMPREHEN METABOLIC PANEL: CPT | Mod: HCNC

## 2020-11-16 PROCEDURE — 80061 LIPID PANEL: CPT | Mod: HCNC

## 2020-11-16 PROCEDURE — 96372 THER/PROPH/DIAG INJ SC/IM: CPT

## 2020-11-16 PROCEDURE — 93010 EKG 12-LEAD: ICD-10-PCS | Mod: HCNC,,, | Performed by: INTERNAL MEDICINE

## 2020-11-16 PROCEDURE — U0002 COVID-19 LAB TEST NON-CDC: HCPCS | Mod: HCNC

## 2020-11-16 PROCEDURE — 93010 ELECTROCARDIOGRAM REPORT: CPT | Mod: HCNC,,, | Performed by: INTERNAL MEDICINE

## 2020-11-16 PROCEDURE — 83880 ASSAY OF NATRIURETIC PEPTIDE: CPT | Mod: HCNC

## 2020-11-16 PROCEDURE — 99291 CRITICAL CARE FIRST HOUR: CPT | Mod: 25,HCNC

## 2020-11-16 PROCEDURE — 36415 COLL VENOUS BLD VENIPUNCTURE: CPT | Mod: HCNC

## 2020-11-16 PROCEDURE — 85025 COMPLETE CBC W/AUTO DIFF WBC: CPT | Mod: HCNC

## 2020-11-16 PROCEDURE — G0425 INPT/ED TELECONSULT30: HCPCS | Mod: HCNC,95,, | Performed by: PSYCHIATRY & NEUROLOGY

## 2020-11-16 PROCEDURE — 93005 ELECTROCARDIOGRAM TRACING: CPT | Mod: HCNC

## 2020-11-16 PROCEDURE — 83036 HEMOGLOBIN GLYCOSYLATED A1C: CPT | Mod: HCNC

## 2020-11-16 PROCEDURE — 84484 ASSAY OF TROPONIN QUANT: CPT | Mod: 91,HCNC

## 2020-11-16 PROCEDURE — 25000003 PHARM REV CODE 250: Mod: HCNC | Performed by: NURSE PRACTITIONER

## 2020-11-16 PROCEDURE — 25000003 PHARM REV CODE 250: Mod: HCNC | Performed by: EMERGENCY MEDICINE

## 2020-11-16 PROCEDURE — 84439 ASSAY OF FREE THYROXINE: CPT | Mod: HCNC

## 2020-11-16 PROCEDURE — 85610 PROTHROMBIN TIME: CPT | Mod: HCNC

## 2020-11-16 PROCEDURE — G0425 PR INPT TELEHEALTH CONSULT 30M: ICD-10-PCS | Mod: HCNC,95,, | Performed by: PSYCHIATRY & NEUROLOGY

## 2020-11-16 PROCEDURE — 84443 ASSAY THYROID STIM HORMONE: CPT | Mod: HCNC

## 2020-11-16 RX ORDER — PRAVASTATIN SODIUM 20 MG/1
40 TABLET ORAL DAILY
Status: DISCONTINUED | OUTPATIENT
Start: 2020-11-17 | End: 2020-11-17 | Stop reason: HOSPADM

## 2020-11-16 RX ORDER — ACETAMINOPHEN 650 MG/1
650 SUPPOSITORY RECTAL
Status: COMPLETED | OUTPATIENT
Start: 2020-11-16 | End: 2020-11-16

## 2020-11-16 RX ORDER — SODIUM CHLORIDE 0.9 % (FLUSH) 0.9 %
10 SYRINGE (ML) INJECTION
Status: DISCONTINUED | OUTPATIENT
Start: 2020-11-16 | End: 2020-11-17 | Stop reason: HOSPADM

## 2020-11-16 RX ORDER — ENOXAPARIN SODIUM 100 MG/ML
30 INJECTION SUBCUTANEOUS EVERY 24 HOURS
Status: DISCONTINUED | OUTPATIENT
Start: 2020-11-16 | End: 2020-11-17 | Stop reason: HOSPADM

## 2020-11-16 RX ORDER — GABAPENTIN 300 MG/1
600 CAPSULE ORAL NIGHTLY
Status: DISCONTINUED | OUTPATIENT
Start: 2020-11-16 | End: 2020-11-17 | Stop reason: HOSPADM

## 2020-11-16 RX ORDER — LABETALOL HYDROCHLORIDE 5 MG/ML
10 INJECTION, SOLUTION INTRAVENOUS EVERY 6 HOURS PRN
Status: DISCONTINUED | OUTPATIENT
Start: 2020-11-16 | End: 2020-11-17 | Stop reason: HOSPADM

## 2020-11-16 RX ORDER — CLOPIDOGREL BISULFATE 75 MG/1
75 TABLET ORAL DAILY
Status: DISCONTINUED | OUTPATIENT
Start: 2020-11-17 | End: 2020-11-17 | Stop reason: HOSPADM

## 2020-11-16 RX ORDER — ASPIRIN 300 MG/1
300 SUPPOSITORY RECTAL DAILY
Status: DISCONTINUED | OUTPATIENT
Start: 2020-11-17 | End: 2020-11-16

## 2020-11-16 RX ORDER — LEVOTHYROXINE SODIUM 75 UG/1
75 TABLET ORAL DAILY
Status: DISCONTINUED | OUTPATIENT
Start: 2020-11-17 | End: 2020-11-17 | Stop reason: HOSPADM

## 2020-11-16 RX ORDER — ASPIRIN 81 MG/1
81 TABLET ORAL NIGHTLY
Status: DISCONTINUED | OUTPATIENT
Start: 2020-11-16 | End: 2020-11-17 | Stop reason: HOSPADM

## 2020-11-16 RX ORDER — GABAPENTIN 300 MG/1
300 CAPSULE ORAL DAILY
Status: DISCONTINUED | OUTPATIENT
Start: 2020-11-17 | End: 2020-11-17 | Stop reason: HOSPADM

## 2020-11-16 RX ADMIN — ENOXAPARIN SODIUM 30 MG: 30 INJECTION SUBCUTANEOUS at 05:11

## 2020-11-16 RX ADMIN — ACETAMINOPHEN 650 MG: 650 SUPPOSITORY RECTAL at 03:11

## 2020-11-16 RX ADMIN — ASPIRIN 81 MG: 81 TABLET, COATED ORAL at 09:11

## 2020-11-16 RX ADMIN — GABAPENTIN 600 MG: 300 CAPSULE ORAL at 11:11

## 2020-11-16 NOTE — PROGRESS NOTES
Pharmacist Renal Dose Adjustment Note    Violet Swenson is a 80 y.o. female being treated with the medication lovenox    Patient Data:    Vital Signs (Most Recent):  Temp: 97.5 °F (36.4 °C) (11/16/20 1603)  Pulse: 74 (11/16/20 1603)  Resp: 18 (11/16/20 1603)  BP: (!) 172/72 (11/16/20 1603)  SpO2: 97 % (11/16/20 1603)   Vital Signs (72h Range):  Temp:  [97.5 °F (36.4 °C)-98.2 °F (36.8 °C)]   Pulse:  [62-90]   Resp:  [12-20]   BP: (135-172)/(65-82)   SpO2:  [96 %-100 %]      Recent Labs   Lab 11/16/20  1327   CREATININE 1.5*     Serum creatinine: 1.5 mg/dL (H) 11/16/20 1327  Estimated creatinine clearance: 28.4 mL/min (A)    Medication: lovenox 40 mg  will be changed to medication: lovenox 30 mg for crcl < 30 ml/min per protocol.    Pharmacist's Name: Vianney Perez [facility]  Pharmacist's Extension: [phone]

## 2020-11-16 NOTE — CONSULTS
[facility]  Vascular Neurology  Comprehensive Stroke Center  Tele-Consultation Note      Consults    Consulting Provider: ANA MARIA GARCIA  Current Providers  No providers found    Patient Location:  [facility] EMERGENCY DEPARTMENT Emergency Department  Spoke hospital nurse at bedside with patient assisting consultant.     Patient information was obtained from patient and relative(s).         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  NIHSS 5, CT head without acute abnormality.  Out of the window for treatment with iv alteplase.  Recommend admission, MRI/MRA brain, MRA neck lipid panel, hemoglobin A1c.  Add ASA to Plavix if x 30 days if no contraindication. Atorvastatin 40 mg daily.  Neurology, PT/speech consults.      STROKE DOCUMENTATION     Acute Stroke Times:   Acute Stroke Times   Last Known Normal Date: 11/16/20  Last Known Normal Time: 0800  Symptom Onset Date: 11/16/20  Symptom Onset Time: 0900  Stroke Team Called Date: 11/16/20  Stroke Team Called Time: 1323  Stroke Team Arrival Date: 11/16/20  Stroke Team Arrival Time: 1333  CT Interpretation Time: 1333  Decision to Treat Time for Alteplase: (No altepplase)  Decision to Treat Time for IR: (No IR)    NIH Scale:  Interval: baseline  1a. Level of Consciousness: 0-->Alert, keenly responsive  1b. LOC Questions: 0-->Answers both questions correctly  1c. LOC Commands: 0-->Performs both tasks correctly  2. Best Gaze: 0-->Normal  3. Visual: 0-->No visual loss  4. Facial Palsy: 1-->Minor paralysis (flattened nasolabial fold, asymmetry on smiling)  5a. Motor Arm, Left: 0-->No drift, limb holds 90 (or 45) degrees for full 10 secs  5b. Motor Arm, Right: 0-->No drift, limb holds 90 (or 45) degrees for full 10 secs  6a. Motor Leg, Left: 0-->No drift, leg holds 30 degree  position for full 5 secs  6b. Motor Leg, Right: 0-->No drift, leg holds 30 degree position for full 5 secs  7. Limb Ataxia: 2-->Present in two limbs  8. Sensory: 0-->Normal, no sensory loss  9. Best Language: 0-->No aphasia, normal  10. Dysarthria: 2-->Severe dysarthria, patients speech is so slurred as to be unintelligible in the absence of or out of proportion to any dysphasia, or is mute/anarthric  11. Extinction and Inattention (formerly Neglect): 0-->No abnormality  Total (NIH Stroke Scale): 5     Modified Manolo Score: 0  Eckley Coma Scale:15   ABCD2 Score:    [id number] Score:   HAS -BLED Score:   ICH Score:   Hunt & Fallon Classification:       Diagnoses: acute brainstem infarct  No new Assessment & Plan notes have been filed under this hospital service since the last note was generated.  Service: Vascular Neurology      Blood pressure (!) 159/70, pulse 62, temperature 98.2 °F (36.8 °C), temperature source Oral, resp. rate 12, SpO2 98 %.  Alteplase Eligible?: No  Alteplase Recommendation: Alteplase not recommended due to Outside of treatment window   Possible Interventional Revascularization Candidate? No; No significant neurological deficit    Disposition Recommendation: admit to inpatient    Subjective:     History of Present Illness: 79 y/o with HTN, DM, CHF, smoker, presents with acute onset slurred speech, L face droop, and double vision. Never had similar symptoms before.  No improving.        No notes on file      Woke up with symptoms?: NO    Recent bleeding noted: No  Does the patient take any Blood Thinners? No  Medications: Plavix    Past Medical History: HTN, DM, CHF, RA, smoking    Past Surgical History: none in past 2 weeks    Family History: no relevant    Social History: smoking    Allergies: Corticosteroids (Glucocorticoids)  Oxycodone     Review of Systems   Constitutional: Negative for diaphoresis and fever.   HENT: Negative for hearing loss, tinnitus and trouble swallowing.    Eyes:  Positive for visual disturbance. Negative for photophobia.   Respiratory: Negative for shortness of breath.    Cardiovascular: Negative for chest pain and palpitations.   Gastrointestinal: Negative for blood in stool and vomiting.   Endocrine: Negative for cold intolerance.   Genitourinary: Negative for hematuria.   Musculoskeletal: Positive for gait problem. Negative for neck pain and neck stiffness.   Skin: Negative for rash.   Allergic/Immunologic: Negative for immunocompromised state.   Neurological: Positive for facial asymmetry, speech difficulty and headaches. Negative for dizziness and weakness.   Hematological: Does not bruise/bleed easily.   Psychiatric/Behavioral: Negative for agitation, behavioral problems and confusion.     Objective:   Vitals: Blood pressure (!) 159/70, pulse 62, temperature 98.2 °F (36.8 °C), temperature source Oral, resp. rate 12, SpO2 98 %.    CT READ: yes, no acute abnormality    Physical Exam  Vitals signs and nursing note reviewed.   Constitutional:       Appearance: Normal appearance. She is not ill-appearing.   HENT:      Head: Normocephalic and atraumatic.      Nose: Nose normal.   Eyes:      Comments: Weakness L adductors   Neck:      Musculoskeletal: Normal range of motion.   Cardiovascular:      Rate and Rhythm: Normal rate and regular rhythm.   Pulmonary:      Effort: No respiratory distress.   Abdominal:      General: There is no distension.   Genitourinary:     Comments: No performed  Musculoskeletal:      Right lower leg: No edema.      Left lower leg: No edema.   Skin:     Findings: No erythema or rash.   Neurological:      Mental Status: She is alert and oriented to person, place, and time.      Cranial Nerves: Cranial nerve deficit present.      Sensory: No sensory deficit.      Motor: No weakness.      Coordination: Coordination abnormal.   Psychiatric:         Mood and Affect: Mood normal.         Behavior: Behavior normal.               Recommended the emergency  room physician to have a brief discussion with the patient and/or family if available regarding the risks and benefits of treatment, and to briefly document the occurrence of that discussion in his clinical encounter note.     The attending portion of this evaluation, treatment, and documentation was performed per Matthew Carl MD via audiovisual.    Billing code:  (non-intervention mild to moderate stroke, TIA, some mimics)    · This patient has a critical neurological condition/illness, with some potential for high morbidity and mortality.  · There is a moderate probability for acute neurological change leading to clinical and possibly life-threatening deterioration requiring highest level of physician preparedness for urgent intervention.  · Care was coordinated with other physicians involved in the patient's care.  · Radiologic studies and laboratory data were reviewed and interpreted, and plan of care was re-assessed based on the results.  · Diagnosis, treatment options and prognosis may have been discussed with the patient and/or family members or caregiver.      In your opinion, this was a: Tier 2 Van Negative    Consult End Time:2 10 pm      Matthew Carl MD  [facility] Stroke Center  Vascular Neurology   [facility]

## 2020-11-16 NOTE — PROGRESS NOTES
HTN Report. Attempting to contact pt to obtain a home BP reading, if available. Unable to reach patient at this time. No answer. Patient does have an appointment scheduled with Nephrology on 12/02/2020.

## 2020-11-16 NOTE — SUBJECTIVE & OBJECTIVE
Past Medical History:   Diagnosis Date    Age-related osteoporosis without current pathological fracture 8/20/2018    Anemia     Anxiety     Arthritis     Atrial flutter     Cancer     lymphoma Large cell B    CHF (congestive heart failure)     Chronic anemia 4/26/2017    Chronic midline low back pain with right-sided sciatica 8/20/2018    Coronary artery disease     01/2015 C patent LCX. 50% stenosis in LAD and RCA.      Depression     Disorder of kidney and ureter     Encounter for blood transfusion     GERD (gastroesophageal reflux disease)     Gout, arthritis     Heart failure     Hx of psychiatric care     Hyperlipidemia     Hypertension     Hypothyroidism     Immune deficiency disorder     Kidney disease     Lung nodule 2014    RML--stable    Obesity     Pacemaker     Metronic    Paroxysmal atrial fibrillation 3/15/2018    Pneumonia     Polyneuropathy     chemo induced    Psychiatric problem     Stroke 11/16/2020    Tobacco dependence     quit 1976    Trouble in sleeping        Past Surgical History:   Procedure Laterality Date    APPENDECTOMY  1966 approx    CARDIAC PACEMAKER PLACEMENT  01/22/2015    CHOLECYSTECTOMY  1993    incidental at time of gastric bypass    COLON SURGERY Right 2017    hemicolectomy    COLONOSCOPY N/A 4/6/2017    Procedure: COLONOSCOPY;  Surgeon: Tye Enamorado MD;  Location: [facility];  Service: Endoscopy;  Laterality: N/A;    COLONOSCOPY N/A 11/28/2018    Procedure: COLONOSCOPY;  Surgeon: Saúl Arthur III, MD;  Location: [facility];  Service: Endoscopy;  Laterality: N/A;    CORONARY ANGIOPLASTY  02/2014    CORONARY STENT PLACEMENT  02/05/2014    ESOPHAGOGASTRODUODENOSCOPY N/A 11/28/2018    Procedure: EGD (ESOPHAGOGASTRODUODENOSCOPY);  Surgeon: Saúl Arthur III, MD;  Location: [facility];  Service: Endoscopy;  Laterality: N/A;    GASTRIC BYPASS  1993    with incidental choly    HERNIA REPAIR      INJECTION OF ANESTHETIC AGENT INTO  SACROILIAC JOINT Right 10/8/2020    Procedure: Right BLOCK, SACROILIAC JOINT with RN IV sedation;  Surgeon: Madi Anton MD;  Location: [facility];  Service: Pain Management;  Laterality: Right;    JOINT REPLACEMENT Bilateral 2009    3 months apart    TONSILLECTOMY  1959       Review of patient's allergies indicates:   Allergen Reactions    Corticosteroids (glucocorticoids) Nausea Only and Other (See Comments)     Stomach pain, dizziness, headache    Oxycodone Other (See Comments)     Blood pressure dropped       No current facility-administered medications on file prior to encounter.      Current Outpatient Medications on File Prior to Encounter   Medication Sig    aspirin (ECOTRIN) 81 MG EC tablet Take 81 mg by mouth nightly.     busPIRone (BUSPAR) 7.5 MG tablet TAKE ONE TABLET BY MOUTH THREE TIMES DAILY    calcitRIOL (ROCALTROL) 0.25 MCG Cap TAKE ONE CAPSULE BY MOUTH EVERY MONDAY, WEDNESDAY AND FRIDAY    clopidogrel (PLAVIX) 75 mg tablet TAKE ONE TABLET BY MOUTH EVERY DAY    DULoxetine (CYMBALTA) 30 MG capsule Take 1 capsule (30 mg total) by mouth once daily.    gabapentin (NEURONTIN) 300 MG capsule TAKE ONE CAPSULE BY MOUTH THREE TIMES DAILY    HYDROcodone-acetaminophen (NORCO)  mg per tablet Take 1 tablet by mouth every 6 (six) hours as needed for Pain.    iron-vitamin C 100-250 mg, ICAR-C, 100-250 mg Tab TAKE ONE TABLET BY MOUTH EVERY DAY    levothyroxine (SYNTHROID) 75 MCG tablet TAKE ONE TABLET BY MOUTH EVERY DAY BEFORE BREAKFAST    meloxicam (MOBIC) 7.5 MG tablet TAKE ONE TABLET BY MOUTH EVERY DAY AS NEEDED FOR PAIN    metoprolol tartrate (LOPRESSOR) 25 MG tablet TAKE ONE TABLET BY MOUTH TWICE DAILY    mirtazapine (REMERON SOL-TAB) 15 MG disintegrating tablet DISSOLVE ONE TABLET BY MOUTH NIGHTLY    pravastatin (PRAVACHOL) 40 MG tablet TAKE ONE TABLET BY MOUTH ONCE DAILY    sertraline (ZOLOFT) 100 MG tablet TAKE 1 & 1/2 TABLETS BY MOUTH EVERY DAY    sodium bicarbonate 650 MG  tablet TAKE ONE TABLET BY MOUTH TWICE DAILY    traMADoL (ULTRAM) 50 mg tablet Take 1 tablet (50 mg total) by mouth every 12 (twelve) hours as needed for Pain.    VITAMIN D2 1,250 mcg (50,000 unit) capsule Take 1 capsule (50,000 Units total) by mouth every 7 days.    ascorbic acid, vitamin C, (VITAMIN C) 100 MG tablet Take by mouth once daily.    diclofenac sodium 1 % Gel Apply 2 g topically once daily. Apply 2 g over painful joints once or twice a day.    ergocalciferol, vitamin D2, 400 unit Tab Take by mouth.    flu vac 2020 65up-[id number],PF, (FLUAD QUAD 2020-21,65Y UP,,PF,) 60 mcg (15 mcg x 4)/0.5 mL Syrg Inject 0.5 mLs into the muscle.    fluocinolone (SYNALAR) 0.01 % external solution AAA of scalp twice a day prn itching.    fluticasone propionate (FLONASE) 50 mcg/actuation nasal spray 2 sprays (100 mcg total) by Each Nare route once daily.    levocetirizine (XYZAL) 5 MG tablet Take 1 tablet (5 mg total) by mouth every evening.    multivitamin (ONE DAILY MULTIVITAMIN) per tablet Take 1 tablet by mouth once daily.    ranitidine (ZANTAC) 150 MG capsule Take 150 mg by mouth nightly.     sodium zirconium cyclosilicate (LOKELMA) 5 gram packet Take 1 packet (5 g total) by mouth once daily. Mix entire contents of packet(s) into drinking glass containing >3 tablespoons of water; stir well and drink immediately. If powder remains in the drinking glass, add water and repeat until no powder remains to ensure entire dose is taken.    tretinoin (RETIN-A) 0.025 % cream Apply a pea-sized amount to the entire face at bedtime.  Use every third night and increase as tolerated to nightly.    triamcinolone acetonide 0.025% (KENALOG) 0.025 % cream Apply topically 2 (two) times daily. PRN itching.    ZINC ACETATE ORAL Take 250 mg by mouth once daily.      Family History     Problem Relation (Age of Onset)    Asthma Daughter    COPD Daughter    Cancer Father, Sister, Brother, Son    Cataracts Mother, Brother    Drug  abuse Son    Heart disease Mother    Hypertension Mother, Maternal Grandfather    Leukemia Brother    Pancreatic cancer Sister    Prostate cancer Son    Stomach cancer Father    Stroke Maternal Grandfather        Tobacco Use    Smoking status: Former Smoker     Packs/day: 2.00     Years: 6.00     Pack years: 12.00     Quit date: 3/15/1976     Years since quittin.7    Smokeless tobacco: Never Used   Substance and Sexual Activity    Alcohol use: No     Alcohol/week: 0.0 standard drinks    Drug use: No    Sexual activity: Never     Review of Systems   Constitutional: Negative.  Negative for activity change, appetite change, fatigue and fever.   HENT: Negative.    Eyes: Positive for visual disturbance.   Respiratory: Negative.  Negative for shortness of breath.    Cardiovascular: Negative.  Negative for chest pain, palpitations and leg swelling.   Gastrointestinal: Negative.    Endocrine: Negative.    Genitourinary: Negative.    Musculoskeletal: Negative.    Skin: Negative.    Allergic/Immunologic: Negative.    Neurological: Positive for facial asymmetry, speech difficulty, weakness and headaches.   Hematological: Negative.    Psychiatric/Behavioral: Negative.      Objective:     Vital Signs (Most Recent):  Temp: 97.5 °F (36.4 °C) (20 1603)  Pulse: 74 (20 1603)  Resp: 18 (20 1603)  BP: (!) 172/72 (20 1603)  SpO2: 97 % (20 1603) Vital Signs (24h Range):  Temp:  [97.5 °F (36.4 °C)-98.2 °F (36.8 °C)] 97.5 °F (36.4 °C)  Pulse:  [62-90] 74  Resp:  [12-20] 18  SpO2:  [96 %-100 %] 97 %  BP: (135-172)/(65-82) 172/72     Weight: 65.8 kg (145 lb 1 oz)  Body mass index is 24.9 kg/m².    Physical Exam  Vitals signs and nursing note reviewed.   Constitutional:       Appearance: She is well-developed.   HENT:      Head: Normocephalic and atraumatic.   Eyes:      Conjunctiva/sclera: Conjunctivae normal.      Pupils: Pupils are equal, round, and reactive to light.   Neck:      Musculoskeletal:  Normal range of motion and neck supple.   Cardiovascular:      Rate and Rhythm: Normal rate and regular rhythm.      Heart sounds: Normal heart sounds.   Pulmonary:      Effort: Pulmonary effort is normal.      Breath sounds: Normal breath sounds.   Abdominal:      General: Bowel sounds are normal.      Palpations: Abdomen is soft.   Musculoskeletal: Normal range of motion.   Skin:     General: Skin is warm and dry.   Neurological:      Mental Status: She is alert and oriented to person, place, and time.      Deep Tendon Reflexes: Reflexes are normal and symmetric.      Comments: Patient is alert and oriented to person, place and time.  Strength is full bilaterally; it is equal and 5/5 in bilateral upper and lower extremities. There is no pronator drift of outstretched arms. Light touch sense is intact. Slurred speech. Left side facial droop. Eyes: Weakness R adductors.     Psychiatric:         Behavior: Behavior normal.         Thought Content: Thought content normal.         Judgment: Judgment normal.          Significant Labs:   BMP:   Recent Labs   Lab 11/16/20  1327   GLU 88      K 5.1   *   CO2 21*   BUN 21   CREATININE 1.5*   CALCIUM 7.8*     CBC:   Recent Labs   Lab 11/16/20  1327   WBC 8.08   HGB 8.9*   HCT 28.6*   *     CMP:   Recent Labs   Lab 11/16/20  1327      K 5.1   *   CO2 21*   GLU 88   BUN 21   CREATININE 1.5*   CALCIUM 7.8*   PROT 6.0   ALBUMIN 2.8*   BILITOT 0.2   ALKPHOS 64   AST 45*   ALT 28   ANIONGAP 5*   EGFRNONAA 33*     Lipid Panel: No results for input(s): CHOL, HDL, LDLCALC, TRIG, CHOLHDL in the last 48 hours.  Troponin:   Recent Labs   Lab 11/16/20  1327   TROPONINI 0.021     TSH:   Recent Labs   Lab 11/16/20  1327   TSH 4.087*     Urine Studies: No results for input(s): COLORU, APPEARANCEUA, PHUR, SPECGRAV, PROTEINUA, GLUCUA, KETONESU, BILIRUBINUA, OCCULTUA, NITRITE, UROBILINOGEN, LEUKOCYTESUR, RBCUA, WBCUA, BACTERIA, SQUAMEPITHEL, HYALINECASTS in the  last 48 hours.    Invalid input(s): [employer]  All pertinent labs within the past 24 hours have been reviewed.    Significant Imaging:   Imaging Results          X-Ray Chest AP Portable (Final result)  Result time 11/16/20 13:47:55    Final result by Vinnie Amezcua MD (11/16/20 13:47:55)                 Impression:      1.  Negative for acute process involving the chest.    2.  Stable findings as noted above.      Electronically signed by: Vinnie Amezcua MD  Date:    11/16/2020  Time:    13:47             Narrative:    EXAMINATION:  XR CHEST AP PORTABLE    CLINICAL HISTORY:  Stroke;    COMPARISON:  August 4, 2017    FINDINGS:  The study is lordotic in position.  EKG leads overlie.  There is a similar degree of mild reticular interstitial changes throughout the right greater than left lungs.  The lungs are free of new pulmonary opacities.  The cardiac silhouette size is normal. The trachea is midline and the mediastinal width is normal. Negative for focal infiltrate, effusion or pneumothorax. Pulmonary vasculature is normal. Negative for osseous abnormalities. Stable elevation of the right hemidiaphragm, dual lead left subclavian pacemaker, and postoperative changes in the although in the epigastric region with a ring like device in place.  Tortuous aorta with calcifications of the aortic knob.  There are degenerative changes of the spine and both shoulder girdles.  Stable convex right curvature of the thoracic spine.                               CT Head Without Contrast (Final result)  Result time 11/16/20 13:45:19    Final result by Nima Abdullahi MD (11/16/20 13:45:19)                 Impression:      Chronic microvascular ischemic changes.  No evidence of intracranial hemorrhage.  If there is additional clinical concern for acute infarct, MRI with diffusion weighted imaging recommended.    Trace right mastoid effusion.      Electronically signed by: Nima Abdullahi MD  Date:    11/16/2020  Time:    13:45              Narrative:    EXAMINATION:  CT HEAD WITHOUT CONTRAST    CLINICAL HISTORY:  Neuro deficit, acute, stroke suspected;    TECHNIQUE:  Low dose axial CT images obtained throughout the head without intravenous contrast. Sagittal and coronal reconstructions were performed.  All CT scans at this facility use dose modulation, iterative reconstruction and/or weight based dosing when appropriate to reduce radiation dose to as low as reasonably achievable.    COMPARISON:  None.    FINDINGS:  Intracranial compartment:    The brain parenchyma demonstrates areas of decreased attenuation with mild to moderate periventricular white matter consistent with chronic microvascular ischemic changes..  No parenchymal mass, hemorrhage, edema or major vascular distribution infarct.  Vascular calcifications are noted.    Mild prominence of the sulci and ventricles are consistent with age-related involutional changes.    No extra-axial blood or fluid collections.    Skull/extracranial contents (limited evaluation): No fracture.  Trace right mastoid effusion.  Left mastoid air cells and paranasal sinuses are essentially clear.

## 2020-11-16 NOTE — SUBJECTIVE & OBJECTIVE
Woke up with symptoms?: {YES NO:69041}    Recent bleeding noted: {Yes / No / Unknown:74}  Does the patient take any Blood Thinners? {Yes / No / Unknown:74}  Medications: {[facility] Medications:33430}      Past Medical History: {[facility] MEDICAL HX:92629}    Past Surgical History: {[facility] SURGICAL HX:67063}    Family History: {[facility] MEDICAL HX:45128}    Social History: {[facility] SOCIAL HX:03880}    Allergies: Corticosteroids (Glucocorticoids)  Oxycodone {[facility] ALLERGIES:90976}    Review of Systems   Constitutional: Negative for diaphoresis and fever.   HENT: Negative for hearing loss, tinnitus and trouble swallowing.    Eyes: Positive for visual disturbance. Negative for photophobia.   Respiratory: Negative for shortness of breath.    Cardiovascular: Negative for chest pain and palpitations.   Gastrointestinal: Negative for blood in stool and vomiting.   Endocrine: Negative for cold intolerance.   Genitourinary: Negative for hematuria.   Musculoskeletal: Positive for gait problem. Negative for neck pain and neck stiffness.   Skin: Negative for rash.   Allergic/Immunologic: Negative for immunocompromised state.   Neurological: Positive for facial asymmetry, speech difficulty and headaches. Negative for dizziness and weakness.   Hematological: Does not bruise/bleed easily.   Psychiatric/Behavioral: Negative for agitation, behavioral problems and confusion.     Objective:   Vitals: Blood pressure (!) 159/70, pulse 62, temperature 98.2 °F (36.8 °C), temperature source Oral, resp. rate 12, SpO2 98 %. {[facility] VITALS:95841}    CT READ: {[facility] CT READ:61305}    Physical Exam  Vitals signs and nursing note reviewed.   Constitutional:       Appearance: Normal appearance. She is not ill-appearing.   HENT:      Head: Normocephalic and atraumatic.      Nose: Nose normal.   Eyes:      Comments: Weakness L adductors   Neck:      Musculoskeletal: Normal range of motion.   Cardiovascular:      Rate and  Rhythm: Normal rate and regular rhythm.   Pulmonary:      Effort: No respiratory distress.   Abdominal:      General: There is no distension.   Genitourinary:     Comments: No performed  Musculoskeletal:      Right lower leg: No edema.      Left lower leg: No edema.   Skin:     Findings: No erythema or rash.   Neurological:      Mental Status: She is alert and oriented to person, place, and time.      Cranial Nerves: Cranial nerve deficit present.      Sensory: No sensory deficit.      Motor: No weakness.      Coordination: Coordination abnormal.   Psychiatric:         Mood and Affect: Mood normal.         Behavior: Behavior normal.

## 2020-11-16 NOTE — HPI
"Violet Swenson is a 80 y.o. female patient with a h/o anemia, anxiety, atrial flutter, lymphoma large cell B, CHF, CAD, depression, GERD, gout, heart failure, HLD, HTN, hypothyroidism kidney disease, lung nodule, obesity, metronic pacemaker, polyneuropathy, who presents to the Emergency Department for evaluation of fatigue which onset this morning. Per [facility], the pt has been intermittently stuttering her speech and was hypoglycemic upon arriving on scene. Pt's daughter states that the pt woke up and was chatting to her before she left the house around 0800 this morning. The pt reports falling back asleep and waking up around 0930 feeling very weak and fatigued. She states, "when I rolled over after waking up, it felt like someone was pushing me back in bed." Associated sxs include R arm weakness, generalized weakness, slurred speech, and trouble ambulating. Patient denies any fever, SOB, CP, n/v, numbness, dizziness, and all other sxs at this time. In the ED, H/H 8.9/28.6, Creatinine 1.5, , TSH 4.087, CT head with no acute findings. Tele-stroke recommended MRI/MRA brain, MRA neck lipid panel, hemoglobin A1c. Add ASA to Plavix if x 30 days if no contraindication. Atorvastatin 40 mg daily. Neurology, PT/speech consults. Patient placed in observation for CVA rule out under the care of hospital medicine.   "

## 2020-11-16 NOTE — ASSESSMENT & PLAN NOTE
CVA r/o- Admit  CT head negative for acute findings   Neuro Consult  Neuro checks  Unable to perform MRI Brain, MRA Brain and neck due to pacemaker implant.   Bilateral Carotid US   Lipid panel  ASA, Plavix, and Statin   Pt failed bedside swallow study   Allow Permissive HTN, PT/OT/ST.   Place NGT for medication

## 2020-11-16 NOTE — ED PROVIDER NOTES
"SCRIBE #1 NOTE: I, Galilea Rahman, am scribing for, and in the presence of, Dustin Paris MD. I have scribed the entire note.       History     Chief Complaint   Patient presents with    Fatigue     weakness, stuttering.  hypoglycemic per [facility]     Review of patient's allergies indicates:   Allergen Reactions    Corticosteroids (glucocorticoids) Nausea Only and Other (See Comments)     Stomach pain, dizziness, headache    Oxycodone Other (See Comments)     Blood pressure dropped         History of Present Illness     HPI    11/16/2020, 12:35 PM  History obtained from the patient's daughter, [facility], and patient.      History of Present Illness: Violet Swenson is a 80 y.o. female patient with a h/o anemia, anxiety, atrial flutter, lymphoma large cell B, CHF, CAD, depression, GERD, gout, heart failure, HLD, HTN, hypothyroidism kidney disease, lung nodule, obesity, metronic pacemaker, polyneuropathy, who presents to the Emergency Department for evaluation of fatigue which onset this morning. Per [facility], the pt has been intermittently stuttering her speech and was hypoglycemic upon arriving on scene. Pt's daughter states that the pt woke up and was chatting to her before she left the house around 0800 this morning. The pt reports falling back asleep and waking up around 0930 feeling very weak and fatigued. She states, "when I rolled over after waking up, it felt like someone was pushing me back in bed." Symptoms are constant and moderate in severity. No mitigating or exacerbating factors reported. Associated sxs include R arm weakness, generalized weakness, slurred speech, and trouble ambulating. Patient denies any fever, SOB, CP, n/v, numbness, dizziness, and all other sxs at this time. No prior Tx reported. No further complaints or concerns at this time.       Arrival mode: [facility]    PCP: Marti Coronel MD      Past Medical History:  Past Medical History:   Diagnosis Date    Age-related " osteoporosis without current pathological fracture 8/20/2018    Anemia     Anxiety     Arthritis     Atrial flutter     Cancer     lymphoma Large cell B    CHF (congestive heart failure)     Chronic anemia 4/26/2017    Chronic midline low back pain with right-sided sciatica 8/20/2018    Coronary artery disease     01/2015 [facility] patent LCX. 50% stenosis in LAD and RCA.      Depression     Disorder of kidney and ureter     Encounter for blood transfusion     GERD (gastroesophageal reflux disease)     Gout, arthritis     Heart failure     Hx of psychiatric care     Hyperlipidemia     Hypertension     Hypothyroidism     Immune deficiency disorder     Kidney disease     Lung nodule 2014    RML--stable    Obesity     Pacemaker     Metronic    Paroxysmal atrial fibrillation 3/15/2018    Pneumonia     Polyneuropathy     chemo induced    Psychiatric problem     Stroke 11/16/2020    Tobacco dependence     quit 1976    Trouble in sleeping        Past Surgical History:  Past Surgical History:   Procedure Laterality Date    APPENDECTOMY  1966 approx    CARDIAC PACEMAKER PLACEMENT  01/22/2015    CHOLECYSTECTOMY  1993    incidental at time of gastric bypass    COLON SURGERY Right 2017    hemicolectomy    COLONOSCOPY N/A 4/6/2017    Procedure: COLONOSCOPY;  Surgeon: Tye Enamorado MD;  Location: [facility];  Service: Endoscopy;  Laterality: N/A;    COLONOSCOPY N/A 11/28/2018    Procedure: COLONOSCOPY;  Surgeon: Saúl Arthur III, MD;  Location: [facility];  Service: Endoscopy;  Laterality: N/A;    CORONARY ANGIOPLASTY  02/2014    CORONARY STENT PLACEMENT  02/05/2014    ESOPHAGOGASTRODUODENOSCOPY N/A 11/28/2018    Procedure: EGD (ESOPHAGOGASTRODUODENOSCOPY);  Surgeon: Saúl Arthur III, MD;  Location: [facility];  Service: Endoscopy;  Laterality: N/A;    GASTRIC BYPASS  1993    with incidental choly    HERNIA REPAIR      INJECTION OF ANESTHETIC AGENT INTO SACROILIAC JOINT Right 10/8/2020  "   Procedure: Right BLOCK, SACROILIAC JOINT with RN IV sedation;  Surgeon: Madi Anton MD;  Location: [facility];  Service: Pain Management;  Laterality: Right;    JOINT REPLACEMENT Bilateral     3 months apart    TONSILLECTOMY  195         Family History:  Family History   Problem Relation Age of Onset    Heart disease Mother     Hypertension Mother     Cataracts Mother     Stomach cancer Father         "ulcers that turned to cancer"    Cancer Father         stomach    Pancreatic cancer Sister     Cancer Sister         pancreatic    Leukemia Brother         "leukemia which led to intestinal cancer"    Cataracts Brother     Cancer Brother         leukemia then later stomach cancer    Drug abuse Son     Cancer Son         prostate cancer    Prostate cancer Son     COPD Daughter     Asthma Daughter     Hypertension Maternal Grandfather     Stroke Maternal Grandfather     Alcohol abuse Neg Hx     Diabetes Neg Hx     Mental retardation Neg Hx     Mental illness Neg Hx        Social History:   Social History     Tobacco Use    Smoking status: Former Smoker     Packs/day: 2.00     Years: 6.00     Pack years: 12.00     Quit date: 3/15/1976     Years since quittin.7    Smokeless tobacco: Never Used   Substance and Sexual Activity    Alcohol use: No     Alcohol/week: 0.0 standard drinks    Drug use: No    Sexual activity: Never        Review of Systems     Review of Systems   Constitutional: Positive for fatigue. Negative for chills and fever.   HENT: Negative for congestion and sore throat.    Respiratory: Negative for shortness of breath.    Cardiovascular: Negative for chest pain.   Gastrointestinal: Negative for nausea and vomiting.   Genitourinary: Negative for dysuria.   Musculoskeletal: Negative for back pain.        (+) trouble ambulating   Skin: Negative for rash.   Neurological: Positive for weakness (generalized and R arm). Negative for dizziness, light-headedness, " numbness and headaches.        (+) slurred speech   Hematological: Does not bruise/bleed easily.   All other systems reviewed and are negative.       Physical Exam     Initial Vitals [11/16/20 1225]   BP Pulse Resp Temp SpO2   (!) 144/82 90 18 98.2 °F (36.8 °C) 98 %      MAP       --          Physical Exam  Nursing Notes and Vital Signs Reviewed.  Constitutional: Well-developed and well-nourished.   Head: Atraumatic. Normocephalic.  Eyes: EOM intact. No scleral icterus.  ENT: Mucous membranes are moist. Oropharynx is clear and symmetric.    Neck: Supple. Full ROM. No lymphadenopathy.  Cardiovascular: Regular rate. Regular rhythm. No murmurs, rubs, or gallops. Distal pulses are 2+ and symmetric.  Pulmonary/Chest: No respiratory distress. Clear to auscultation bilaterally. No wheezing or rales.  Abdominal: Soft and non-distended.  There is no tenderness.  No rebound, guarding, or rigidity.  Genitourinary: No CVA tenderness  Musculoskeletal: Moves all extremities. No obvious deformities. No calf tenderness.  Skin: Warm and dry.  Neurological:  Alert, awake, and appropriate. Slurred speech. L-sided facial droop noted.  Psychiatric: Normal affect. Good eye contact. Appropriate in content.     ED Course   Critical Care    Date/Time: 11/16/2020 3:09 PM  Performed by: Dustin Paris MD  Authorized by: Dustin Paris MD   Direct patient critical care time: 10 minutes  Additional history critical care time: 10 minutes  Ordering / reviewing critical care time: 10 minutes  Documentation critical care time: 10 minutes  Total critical care time (exclusive of procedural time) : 40 minutes  Critical care time was exclusive of separately billable procedures and treating other patients and teaching time.  Critical care was necessary to treat or prevent imminent or life-threatening deterioration of the following conditions: stroke.  Critical care was time spent personally by me on the following activities: blood  draw for specimens, development of treatment plan with patient or surrogate, discussions with consultants, interpretation of cardiac output measurements, evaluation of patient's response to treatment, examination of patient, obtaining history from patient or surrogate, ordering and performing treatments and interventions, ordering and review of laboratory studies, ordering and review of radiographic studies, pulse oximetry, re-evaluation of patient's condition and review of old charts.        ED Vital Signs:  Vitals:    11/16/20 1225 11/16/20 1322 11/16/20 1325 11/16/20 1400   BP: (!) 144/82 (!) 159/70  (!) 156/65   Pulse: 90 67 62 66   Resp: 18 12  20   Temp: 98.2 °F (36.8 °C)      TempSrc: Oral      SpO2: 98%   96%    11/16/20 1430   BP: 135/81   Pulse: 77   Resp: 18   Temp:    TempSrc:    SpO2: 100%       Abnormal Lab Results:  Labs Reviewed   CBC W/ AUTO DIFFERENTIAL - Abnormal; Notable for the following components:       Result Value    RBC 2.75 (*)     Hemoglobin 8.9 (*)     Hematocrit 28.6 (*)      (*)     MCH 32.4 (*)     MCHC 31.1 (*)     RDW 15.4 (*)     Platelets 372 (*)     Immature Granulocytes 1.0 (*)     Immature Grans (Abs) 0.08 (*)     All other components within normal limits   COMPREHENSIVE METABOLIC PANEL - Abnormal; Notable for the following components:    Chloride 113 (*)     CO2 21 (*)     Creatinine 1.5 (*)     Calcium 7.8 (*)     Albumin 2.8 (*)     AST 45 (*)     Anion Gap 5 (*)     eGFR if  38 (*)     eGFR if non  33 (*)     All other components within normal limits   TSH - Abnormal; Notable for the following components:    TSH 4.087 (*)     All other components within normal limits   B-TYPE NATRIURETIC PEPTIDE - Abnormal; Notable for the following components:     (*)     All other components within normal limits   PROTIME-INR   TROPONIN I   SARS-COV-2 RNA AMPLIFICATION, QUAL   T4, FREE   LIPID PANEL   POCT GLUCOSE, HAND-HELD DEVICE        All  Lab Results:  Results for orders placed or performed during the hospital encounter of 11/16/20   CBC W/ AUTO DIFFERENTIAL   Result Value Ref Range    WBC 8.08 3.90 - 12.70 K/uL    RBC 2.75 (L) 4.00 - 5.40 M/uL    Hemoglobin 8.9 (L) 12.0 - 16.0 g/dL    Hematocrit 28.6 (L) 37.0 - 48.5 %     (H) 82 - 98 fL    MCH 32.4 (H) 27.0 - 31.0 pg    MCHC 31.1 (L) 32.0 - 36.0 g/dL    RDW 15.4 (H) 11.5 - 14.5 %    Platelets 372 (H) 150 - 350 K/uL    MPV 9.7 9.2 - 12.9 fL    Immature Granulocytes 1.0 (H) 0.0 - 0.5 %    Gran # (ANC) 4.9 1.8 - 7.7 K/uL    Immature Grans (Abs) 0.08 (H) 0.00 - 0.04 K/uL    Lymph # 2.6 1.0 - 4.8 K/uL    Mono # 0.4 0.3 - 1.0 K/uL    Eos # 0.1 0.0 - 0.5 K/uL    Baso # 0.03 0.00 - 0.20 K/uL    nRBC 0 0 /100 WBC    Gran % 60.1 38.0 - 73.0 %    Lymph % 32.3 18.0 - 48.0 %    Mono % 5.2 4.0 - 15.0 %    Eosinophil % 1.0 0.0 - 8.0 %    Basophil % 0.4 0.0 - 1.9 %    Differential Method Automated    Comprehensive metabolic panel   Result Value Ref Range    Sodium 139 136 - 145 mmol/L    Potassium 5.1 3.5 - 5.1 mmol/L    Chloride 113 (H) 95 - 110 mmol/L    CO2 21 (L) 23 - 29 mmol/L    Glucose 88 70 - 110 mg/dL    BUN 21 8 - 23 mg/dL    Creatinine 1.5 (H) 0.5 - 1.4 mg/dL    Calcium 7.8 (L) 8.7 - 10.5 mg/dL    Total Protein 6.0 6.0 - 8.4 g/dL    Albumin 2.8 (L) 3.5 - 5.2 g/dL    Total Bilirubin 0.2 0.1 - 1.0 mg/dL    Alkaline Phosphatase 64 55 - 135 U/L    AST 45 (H) 10 - 40 U/L    ALT 28 10 - 44 U/L    Anion Gap 5 (L) 8 - 16 mmol/L    eGFR if African American 38 (A) >60 mL/min/1.73 m^2    eGFR if non African American 33 (A) >60 mL/min/1.73 m^2   Protime-INR   Result Value Ref Range    Prothrombin Time 10.5 9.0 - 12.5 sec    INR 1.0 0.8 - 1.2   TSH   Result Value Ref Range    TSH 4.087 (H) 0.400 - 4.000 uIU/mL   Troponin I   Result Value Ref Range    Troponin I 0.021 0.000 - 0.026 ng/mL   B-Type natriuretic peptide   Result Value Ref Range     (H) 0 - 99 pg/mL   COVID-19 Rapid Screening   Result Value  Ref Range    SARS-CoV-2 RNA, Amplification, Qual Negative Negative   T4, Free   Result Value Ref Range    Free T4 0.72 0.71 - 1.51 ng/dL         Imaging Results          X-Ray Chest AP Portable (Final result)  Result time 11/16/20 13:47:55    Final result by Vinnie Amezcua MD (11/16/20 13:47:55)                 Impression:      1.  Negative for acute process involving the chest.    2.  Stable findings as noted above.      Electronically signed by: Vinnie Amezcua MD  Date:    11/16/2020  Time:    13:47             Narrative:    EXAMINATION:  XR CHEST AP PORTABLE    CLINICAL HISTORY:  Stroke;    COMPARISON:  August 4, 2017    FINDINGS:  The study is lordotic in position.  EKG leads overlie.  There is a similar degree of mild reticular interstitial changes throughout the right greater than left lungs.  The lungs are free of new pulmonary opacities.  The cardiac silhouette size is normal. The trachea is midline and the mediastinal width is normal. Negative for focal infiltrate, effusion or pneumothorax. Pulmonary vasculature is normal. Negative for osseous abnormalities. Stable elevation of the right hemidiaphragm, dual lead left subclavian pacemaker, and postoperative changes in the although in the epigastric region with a ring like device in place.  Tortuous aorta with calcifications of the aortic knob.  There are degenerative changes of the spine and both shoulder girdles.  Stable convex right curvature of the thoracic spine.                               CT Head Without Contrast (Final result)  Result time 11/16/20 13:45:19    Final result by Nima Abdullahi MD (11/16/20 13:45:19)                 Impression:      Chronic microvascular ischemic changes.  No evidence of intracranial hemorrhage.  If there is additional clinical concern for acute infarct, MRI with diffusion weighted imaging recommended.    Trace right mastoid effusion.      Electronically signed by: Nima Abdullahi MD  Date:    11/16/2020  Time:    13:45              Narrative:    EXAMINATION:  CT HEAD WITHOUT CONTRAST    CLINICAL HISTORY:  Neuro deficit, acute, stroke suspected;    TECHNIQUE:  Low dose axial CT images obtained throughout the head without intravenous contrast. Sagittal and coronal reconstructions were performed.  All CT scans at this facility use dose modulation, iterative reconstruction and/or weight based dosing when appropriate to reduce radiation dose to as low as reasonably achievable.    COMPARISON:  None.    FINDINGS:  Intracranial compartment:    The brain parenchyma demonstrates areas of decreased attenuation with mild to moderate periventricular white matter consistent with chronic microvascular ischemic changes..  No parenchymal mass, hemorrhage, edema or major vascular distribution infarct.  Vascular calcifications are noted.    Mild prominence of the sulci and ventricles are consistent with age-related involutional changes.    No extra-axial blood or fluid collections.    Skull/extracranial contents (limited evaluation): No fracture.  Trace right mastoid effusion.  Left mastoid air cells and paranasal sinuses are essentially clear.                                 The EKG was ordered, reviewed, and independently interpreted by the ED provider.  Interpretation time: 13:26  Rate: 64 BPM  Rhythm: AV dual-paced rhythm with prolonged AV conduction  Interpretation: No acute ST changes. No STEMI.      The Emergency Provider reviewed the vital signs and test results, which are outlined above.     ED Discussion     1:55 PM: Discussed pt's case with Dr. Carl (Tele Neurology) who reports that the pt is not a tpa candidate and recommends stroke workup.    3:07 PM: Discussed case with Carleen Roman NP (Hospital Medicine). Dr. Carranza agrees with current care and management of pt and accepts admission.   Admitting Service: Hospital Medicine  Admit to: obs med tele    Re-evaluated pt. I have discussed test results, shared treatment plan, and  the need for admission with patient and family at bedside. Pt and family express understanding at this time and agree with all information. All questions answered. Pt and family have no further questions or concerns at this time. Pt is ready for admit.       MDM        Medical Decision Making:   Clinical Tests:   Lab Tests: Ordered and Reviewed  Radiological Study: Ordered and Reviewed  Medical Tests: Ordered and Reviewed           ED Medication(s):  Medications - No data to display    New Prescriptions    No medications on file               Scribe Attestation:   Scribe #1: I performed the above scribed service and the documentation accurately describes the services I performed. I attest to the accuracy of the note.     Attending:   Physician Attestation Statement for Scribe #1: I, Dutsin Paris MD, personally performed the services described in this documentation, as scribed by Galilea Rahman, in my presence, and it is both accurate and complete.           Clinical Impression       ICD-10-CM ICD-9-CM   1. Stroke  I63.9 434.91       Disposition:   Disposition: Placed in Observation  Condition: Fair         Dustin Paris MD  11/17/20 [id number]

## 2020-11-16 NOTE — SUBJECTIVE & OBJECTIVE
Woke up with symptoms?: no    Recent bleeding noted: no  Does the patient take any Blood Thinners? no  Medications: Antiplatelets:  clopidogrel/Plavix      Past Medical History: hypertension, diabetes and CHF    Past Surgical History: no major surgeries within the last 2 weeks    Family History: no relevant history    Social History: smoker (active)    Allergies: Corticosteroids (Glucocorticoids)  Oxycodone No known drug allergies    Review of Systems  Objective:   Vitals: Blood pressure (!) 159/70, pulse 62, temperature 98.2 °F (36.8 °C), temperature source Oral, resp. rate 12, SpO2 98 %. BP: 159/70, Respiratory Rate: 17 and Heart Rate: 78    CT READ: Yes  No hemmorhage. No mass effect. No early infarct signs.     Physical Exam

## 2020-11-17 ENCOUNTER — TELEPHONE (OUTPATIENT)
Dept: INTERNAL MEDICINE | Facility: CLINIC | Age: 80
End: 2020-11-17

## 2020-11-17 VITALS
TEMPERATURE: 98 F | WEIGHT: 145 LBS | RESPIRATION RATE: 18 BRPM | OXYGEN SATURATION: 100 % | BODY MASS INDEX: 24.75 KG/M2 | DIASTOLIC BLOOD PRESSURE: 72 MMHG | SYSTOLIC BLOOD PRESSURE: 158 MMHG | HEIGHT: 64 IN | HEART RATE: 79 BPM

## 2020-11-17 PROBLEM — I63.9 STROKE: Status: RESOLVED | Noted: 2020-11-16 | Resolved: 2020-11-17

## 2020-11-17 LAB
ALBUMIN SERPL BCP-MCNC: 2.7 G/DL (ref 3.5–5.2)
ALP SERPL-CCNC: 74 U/L (ref 55–135)
ALT SERPL W/O P-5'-P-CCNC: 25 U/L (ref 10–44)
ANION GAP SERPL CALC-SCNC: 6 MMOL/L (ref 8–16)
AST SERPL-CCNC: 43 U/L (ref 10–40)
BASOPHILS # BLD AUTO: 0.05 K/UL (ref 0–0.2)
BASOPHILS NFR BLD: 0.7 % (ref 0–1.9)
BILIRUB SERPL-MCNC: 0.3 MG/DL (ref 0.1–1)
BUN SERPL-MCNC: 18 MG/DL (ref 8–23)
CALCIUM SERPL-MCNC: 8 MG/DL (ref 8.7–10.5)
CHLORIDE SERPL-SCNC: 114 MMOL/L (ref 95–110)
CHOLEST SERPL-MCNC: 101 MG/DL (ref 120–199)
CHOLEST/HDLC SERPL: 2.4 {RATIO} (ref 2–5)
CO2 SERPL-SCNC: 20 MMOL/L (ref 23–29)
CREAT SERPL-MCNC: 1.3 MG/DL (ref 0.5–1.4)
DIFFERENTIAL METHOD: ABNORMAL
EOSINOPHIL # BLD AUTO: 0.1 K/UL (ref 0–0.5)
EOSINOPHIL NFR BLD: 1.6 % (ref 0–8)
ERYTHROCYTE [DISTWIDTH] IN BLOOD BY AUTOMATED COUNT: 15.4 % (ref 11.5–14.5)
EST. GFR  (AFRICAN AMERICAN): 45 ML/MIN/1.73 M^2
EST. GFR  (NON AFRICAN AMERICAN): 39 ML/MIN/1.73 M^2
ESTIMATED AVG GLUCOSE: 94 MG/DL (ref 68–131)
GLUCOSE SERPL-MCNC: 73 MG/DL (ref 70–110)
HBA1C MFR BLD HPLC: 4.9 % (ref 4–5.6)
HCT VFR BLD AUTO: 29.9 % (ref 37–48.5)
HDLC SERPL-MCNC: 42 MG/DL (ref 40–75)
HDLC SERPL: 41.6 % (ref 20–50)
HGB BLD-MCNC: 9.2 G/DL (ref 12–16)
IMM GRANULOCYTES # BLD AUTO: 0.05 K/UL (ref 0–0.04)
IMM GRANULOCYTES NFR BLD AUTO: 0.7 % (ref 0–0.5)
INR PPP: 1 (ref 0.8–1.2)
LDLC SERPL CALC-MCNC: 39.8 MG/DL (ref 63–159)
LYMPHOCYTES # BLD AUTO: 2.3 K/UL (ref 1–4.8)
LYMPHOCYTES NFR BLD: 30.5 % (ref 18–48)
MAGNESIUM SERPL-MCNC: 2.1 MG/DL (ref 1.6–2.6)
MCH RBC QN AUTO: 31.8 PG (ref 27–31)
MCHC RBC AUTO-ENTMCNC: 30.8 G/DL (ref 32–36)
MCV RBC AUTO: 104 FL (ref 82–98)
MONOCYTES # BLD AUTO: 0.5 K/UL (ref 0.3–1)
MONOCYTES NFR BLD: 7.2 % (ref 4–15)
NEUTROPHILS # BLD AUTO: 4.5 K/UL (ref 1.8–7.7)
NEUTROPHILS NFR BLD: 59.3 % (ref 38–73)
NONHDLC SERPL-MCNC: 59 MG/DL
NRBC BLD-RTO: 0 /100 WBC
PLATELET # BLD AUTO: 391 K/UL (ref 150–350)
PMV BLD AUTO: 9.7 FL (ref 9.2–12.9)
POCT GLUCOSE: 73 MG/DL (ref 70–110)
POCT GLUCOSE: 92 MG/DL (ref 70–110)
POCT GLUCOSE: 95 MG/DL (ref 70–110)
POTASSIUM SERPL-SCNC: 5.5 MMOL/L (ref 3.5–5.1)
PROT SERPL-MCNC: 6 G/DL (ref 6–8.4)
PROTHROMBIN TIME: 10.5 SEC (ref 9–12.5)
RBC # BLD AUTO: 2.89 M/UL (ref 4–5.4)
SODIUM SERPL-SCNC: 140 MMOL/L (ref 136–145)
TRIGL SERPL-MCNC: 96 MG/DL (ref 30–150)
WBC # BLD AUTO: 7.5 K/UL (ref 3.9–12.7)

## 2020-11-17 PROCEDURE — 99214 PR OFFICE/OUTPT VISIT, EST, LEVL IV, 30-39 MIN: ICD-10-PCS | Mod: HCNC,,, | Performed by: PSYCHIATRY & NEUROLOGY

## 2020-11-17 PROCEDURE — 97162 PT EVAL MOD COMPLEX 30 MIN: CPT | Mod: HCNC

## 2020-11-17 PROCEDURE — 92610 EVALUATE SWALLOWING FUNCTION: CPT | Mod: HCNC

## 2020-11-17 PROCEDURE — G0378 HOSPITAL OBSERVATION PER HR: HCPCS | Mod: HCNC

## 2020-11-17 PROCEDURE — 97165 OT EVAL LOW COMPLEX 30 MIN: CPT | Mod: HCNC

## 2020-11-17 PROCEDURE — 25000003 PHARM REV CODE 250: Mod: HCNC | Performed by: NURSE PRACTITIONER

## 2020-11-17 PROCEDURE — 94761 N-INVAS EAR/PLS OXIMETRY MLT: CPT | Mod: HCNC

## 2020-11-17 PROCEDURE — 97530 THERAPEUTIC ACTIVITIES: CPT | Mod: HCNC

## 2020-11-17 PROCEDURE — 99214 OFFICE O/P EST MOD 30 MIN: CPT | Mod: HCNC,,, | Performed by: PSYCHIATRY & NEUROLOGY

## 2020-11-17 PROCEDURE — 97116 GAIT TRAINING THERAPY: CPT | Mod: HCNC,59

## 2020-11-17 PROCEDURE — 85025 COMPLETE CBC W/AUTO DIFF WBC: CPT | Mod: HCNC

## 2020-11-17 PROCEDURE — 36415 COLL VENOUS BLD VENIPUNCTURE: CPT | Mod: HCNC

## 2020-11-17 PROCEDURE — 80053 COMPREHEN METABOLIC PANEL: CPT | Mod: HCNC

## 2020-11-17 PROCEDURE — 85610 PROTHROMBIN TIME: CPT | Mod: HCNC

## 2020-11-17 PROCEDURE — 83735 ASSAY OF MAGNESIUM: CPT | Mod: HCNC

## 2020-11-17 RX ORDER — CLOPIDOGREL BISULFATE 75 MG/1
75 TABLET ORAL DAILY
Qty: 30 TABLET | Refills: 0 | Status: SHIPPED | OUTPATIENT
Start: 2020-11-17 | End: 2020-12-24

## 2020-11-17 RX ADMIN — LEVOTHYROXINE SODIUM 75 MCG: 75 TABLET ORAL at 08:11

## 2020-11-17 RX ADMIN — PRAVASTATIN SODIUM 40 MG: 20 TABLET ORAL at 08:11

## 2020-11-17 RX ADMIN — GABAPENTIN 300 MG: 300 CAPSULE ORAL at 08:11

## 2020-11-17 RX ADMIN — CLOPIDOGREL 75 MG: 75 TABLET, FILM COATED ORAL at 08:11

## 2020-11-17 NOTE — PLAN OF CARE
Problem: Fall Injury Risk  Goal: Absence of Fall and Fall-Related Injury  Outcome: Met     Problem: Adult Inpatient Plan of Care  Goal: Plan of Care Review  Outcome: Met  Goal: Patient-Specific Goal (Individualization)  Outcome: Met  Goal: Absence of Hospital-Acquired Illness or Injury  Outcome: Met  Goal: Optimal Comfort and Wellbeing  Outcome: Met  Goal: Readiness for Transition of Care  Outcome: Met  Goal: Rounds/Family Conference  Outcome: Met     Problem: Adjustment to Illness (Stroke, Ischemic/Transient Ischemic Attack)  Goal: Optimal Coping  Outcome: Met     Problem: Bowel Elimination Impaired (Stroke, Ischemic/Transient Ischemic Attack)  Goal: Effective Bowel Elimination  Outcome: Met     Problem: Cerebral Tissue Perfusion Risk (Stroke, Ischemic/Transient Ischemic Attack)  Goal: Optimal Cerebral Tissue Perfusion  Outcome: Met     Problem: Communication Impairment (Stroke, Ischemic/Transient Ischemic Attack)  Goal: Improved Communication Skills  Outcome: Met     Problem: Eating/Swallowing Impairment (Stroke, Ischemic/Transient Ischemic Attack)  Goal: Oral Intake without Aspiration  Outcome: Met     Problem: Functional Ability Impaired (Stroke, Ischemic/Transient Ischemic Attack)  Goal: Optimal Functional Ability  Outcome: Met     Problem: Hemodynamic Instability (Stroke, Ischemic/Transient Ischemic Attack)  Goal: Vital Signs Remain in Desired Range  Outcome: Met     Problem: Pain (Stroke, Ischemic/Transient Ischemic Attack)  Goal: Acceptable Pain Control  Outcome: Met     Problem: Sensorimotor Impairment (Stroke, Ischemic/Transient Ischemic Attack)  Goal: Improved Sensorimotor Function  Outcome: Met     Problem: Urinary Elimination Impaired (Stroke, Ischemic/Transient Ischemic Attack)  Goal: Effective Urinary Elimination  Outcome: Met     Problem: Wound  Goal: Optimal Wound Healing  Outcome: Met     Problem: Infection  Goal: Infection Symptom Resolution  Outcome: Met     Problem: Skin Injury Risk  Increased  Goal: Skin Health and Integrity  Outcome: Met

## 2020-11-17 NOTE — PLAN OF CARE
Patient is in stable condition, no acute distress, remain free from injuries, on cardiac monitoring( paced rhythm 70s), blood glucose checks performed, NG tube in place and tolerating well, and will continue to monitor. 24 hr chart check performed.

## 2020-11-17 NOTE — NURSING
Discharge instructions reviewed with pt, verbalized understanding. Iv removed with catheter intact. pt aaox4. resp even and unlabored. In no acute distress. Left with all belongings

## 2020-11-17 NOTE — H&P
"[facility] Hospital Medicine  History & Physical    Patient Name: Violet Swenson  MRN: [id number]  Admission Date: 11/16/2020  Attending Physician: Jesús Carranza MD   Primary Care Provider: Marti Coronel MD         Patient information was obtained from patient, parent and ER records.     Subjective:     Principal Problem:Stroke    Chief Complaint:   Chief Complaint   Patient presents with    Fatigue     weakness, stuttering.  hypoglycemic per [facility]        HPI: Violet Swenson is a 80 y.o. female patient with a h/o anemia, anxiety, atrial flutter, lymphoma large cell B, CHF, CAD, depression, GERD, gout, heart failure, HLD, HTN, hypothyroidism kidney disease, lung nodule, obesity, metronic pacemaker, polyneuropathy, who presents to the Emergency Department for evaluation of fatigue which onset this morning. Per [facility], the pt has been intermittently stuttering her speech and was hypoglycemic upon arriving on scene. Pt's daughter states that the pt woke up and was chatting to her before she left the house around 0800 this morning. The pt reports falling back asleep and waking up around 0930 feeling very weak and fatigued. She states, "when I rolled over after waking up, it felt like someone was pushing me back in bed." Associated sxs include R arm weakness, generalized weakness, slurred speech, and trouble ambulating. Patient denies any fever, SOB, CP, n/v, numbness, dizziness, and all other sxs at this time. In the ED, H/H 8.9/28.6, Creatinine 1.5, , TSH 4.087, CT head with no acute findings. Tele-stroke recommended MRI/MRA brain, MRA neck lipid panel, hemoglobin A1c. Add ASA to Plavix if x 30 days if no contraindication. Atorvastatin 40 mg daily. Neurology, PT/speech consults. Patient placed in observation for CVA rule out under the care of hospital medicine.     Past Medical History:   Diagnosis Date    Age-related osteoporosis without current pathological " fracture 8/20/2018    Anemia     Anxiety     Arthritis     Atrial flutter     Cancer     lymphoma Large cell B    CHF (congestive heart failure)     Chronic anemia 4/26/2017    Chronic midline low back pain with right-sided sciatica 8/20/2018    Coronary artery disease     01/2015 [facility] patent LCX. 50% stenosis in LAD and RCA.      Depression     Disorder of kidney and ureter     Encounter for blood transfusion     GERD (gastroesophageal reflux disease)     Gout, arthritis     Heart failure     Hx of psychiatric care     Hyperlipidemia     Hypertension     Hypothyroidism     Immune deficiency disorder     Kidney disease     Lung nodule 2014    RML--stable    Obesity     Pacemaker     Metronic    Paroxysmal atrial fibrillation 3/15/2018    Pneumonia     Polyneuropathy     chemo induced    Psychiatric problem     Stroke 11/16/2020    Tobacco dependence     quit 1976    Trouble in sleeping        Past Surgical History:   Procedure Laterality Date    APPENDECTOMY  1966 approx    CARDIAC PACEMAKER PLACEMENT  01/22/2015    CHOLECYSTECTOMY  1993    incidental at time of gastric bypass    COLON SURGERY Right 2017    hemicolectomy    COLONOSCOPY N/A 4/6/2017    Procedure: COLONOSCOPY;  Surgeon: Tye Enamorado MD;  Location: [facility];  Service: Endoscopy;  Laterality: N/A;    COLONOSCOPY N/A 11/28/2018    Procedure: COLONOSCOPY;  Surgeon: Saúl Arthur III, MD;  Location: [facility];  Service: Endoscopy;  Laterality: N/A;    CORONARY ANGIOPLASTY  02/2014    CORONARY STENT PLACEMENT  02/05/2014    ESOPHAGOGASTRODUODENOSCOPY N/A 11/28/2018    Procedure: EGD (ESOPHAGOGASTRODUODENOSCOPY);  Surgeon: Saúl Arthur III, MD;  Location: [facility];  Service: Endoscopy;  Laterality: N/A;    GASTRIC BYPASS  1993    with incidental choly    HERNIA REPAIR      INJECTION OF ANESTHETIC AGENT INTO SACROILIAC JOINT Right 10/8/2020    Procedure: Right BLOCK, SACROILIAC JOINT with RN IV sedation;   Surgeon: Madi Anton MD;  Location: [facility] PAIN T;  Service: Pain Management;  Laterality: Right;    JOINT REPLACEMENT Bilateral 2009    3 months apart    TONSILLECTOMY  1959       Review of patient's allergies indicates:   Allergen Reactions    Corticosteroids (glucocorticoids) Nausea Only and Other (See Comments)     Stomach pain, dizziness, headache    Oxycodone Other (See Comments)     Blood pressure dropped       No current facility-administered medications on file prior to encounter.      Current Outpatient Medications on File Prior to Encounter   Medication Sig    aspirin (ECOTRIN) 81 MG EC tablet Take 81 mg by mouth nightly.     busPIRone (BUSPAR) 7.5 MG tablet TAKE ONE TABLET BY MOUTH THREE TIMES DAILY    calcitRIOL (ROCALTROL) 0.25 MCG Cap TAKE ONE CAPSULE BY MOUTH EVERY MONDAY, WEDNESDAY AND FRIDAY    clopidogrel (PLAVIX) 75 mg tablet TAKE ONE TABLET BY MOUTH EVERY DAY    DULoxetine (CYMBALTA) 30 MG capsule Take 1 capsule (30 mg total) by mouth once daily.    gabapentin (NEURONTIN) 300 MG capsule TAKE ONE CAPSULE BY MOUTH THREE TIMES DAILY    HYDROcodone-acetaminophen (NORCO)  mg per tablet Take 1 tablet by mouth every 6 (six) hours as needed for Pain.    iron-vitamin C 100-250 mg, ICAR-C, 100-250 mg Tab TAKE ONE TABLET BY MOUTH EVERY DAY    levothyroxine (SYNTHROID) 75 MCG tablet TAKE ONE TABLET BY MOUTH EVERY DAY BEFORE BREAKFAST    meloxicam (MOBIC) 7.5 MG tablet TAKE ONE TABLET BY MOUTH EVERY DAY AS NEEDED FOR PAIN    metoprolol tartrate (LOPRESSOR) 25 MG tablet TAKE ONE TABLET BY MOUTH TWICE DAILY    mirtazapine (REMERON SOL-TAB) 15 MG disintegrating tablet DISSOLVE ONE TABLET BY MOUTH NIGHTLY    pravastatin (PRAVACHOL) 40 MG tablet TAKE ONE TABLET BY MOUTH ONCE DAILY    sertraline (ZOLOFT) 100 MG tablet TAKE 1 & 1/2 TABLETS BY MOUTH EVERY DAY    sodium bicarbonate 650 MG tablet TAKE ONE TABLET BY MOUTH TWICE DAILY    traMADoL (ULTRAM) 50 mg tablet Take 1 tablet (50 mg  total) by mouth every 12 (twelve) hours as needed for Pain.    VITAMIN D2 1,250 mcg (50,000 unit) capsule Take 1 capsule (50,000 Units total) by mouth every 7 days.    ascorbic acid, vitamin C, (VITAMIN C) 100 MG tablet Take by mouth once daily.    diclofenac sodium 1 % Gel Apply 2 g topically once daily. Apply 2 g over painful joints once or twice a day.    ergocalciferol, vitamin D2, 400 unit Tab Take by mouth.    flu vac 2020 [id number],PF, (FLUAD QUAD 2020-21,65Y UP,,PF,) 60 mcg (15 mcg x 4)/0.5 mL Syrg Inject 0.5 mLs into the muscle.    fluocinolone (SYNALAR) 0.01 % external solution AAA of scalp twice a day prn itching.    fluticasone propionate (FLONASE) 50 mcg/actuation nasal spray 2 sprays (100 mcg total) by Each Nare route once daily.    levocetirizine (XYZAL) 5 MG tablet Take 1 tablet (5 mg total) by mouth every evening.    multivitamin (ONE DAILY MULTIVITAMIN) per tablet Take 1 tablet by mouth once daily.    ranitidine (ZANTAC) 150 MG capsule Take 150 mg by mouth nightly.     sodium zirconium cyclosilicate (LOKELMA) 5 gram packet Take 1 packet (5 g total) by mouth once daily. Mix entire contents of packet(s) into drinking glass containing >3 tablespoons of water; stir well and drink immediately. If powder remains in the drinking glass, add water and repeat until no powder remains to ensure entire dose is taken.    tretinoin (RETIN-A) 0.025 % cream Apply a pea-sized amount to the entire face at bedtime.  Use every third night and increase as tolerated to nightly.    triamcinolone acetonide 0.025% (KENALOG) 0.025 % cream Apply topically 2 (two) times daily. PRN itching.    ZINC ACETATE ORAL Take 250 mg by mouth once daily.      Family History     Problem Relation (Age of Onset)    Asthma Daughter    COPD Daughter    Cancer Father, Sister, Brother, Son    Cataracts Mother, Brother    Drug abuse Son    Heart disease Mother    Hypertension Mother, Maternal Grandfather    Leukemia Brother     Pancreatic cancer Sister    Prostate cancer Son    Stomach cancer Father    Stroke Maternal Grandfather        Tobacco Use    Smoking status: Former Smoker     Packs/day: 2.00     Years: 6.00     Pack years: 12.00     Quit date: 3/15/1976     Years since quittin.7    Smokeless tobacco: Never Used   Substance and Sexual Activity    Alcohol use: No     Alcohol/week: 0.0 standard drinks    Drug use: No    Sexual activity: Never     Review of Systems   Constitutional: Negative.  Negative for activity change, appetite change, fatigue and fever.   HENT: Negative.    Eyes: Positive for visual disturbance.   Respiratory: Negative.  Negative for shortness of breath.    Cardiovascular: Negative.  Negative for chest pain, palpitations and leg swelling.   Gastrointestinal: Negative.    Endocrine: Negative.    Genitourinary: Negative.    Musculoskeletal: Negative.    Skin: Negative.    Allergic/Immunologic: Negative.    Neurological: Positive for facial asymmetry, speech difficulty, weakness and headaches.   Hematological: Negative.    Psychiatric/Behavioral: Negative.      Objective:     Vital Signs (Most Recent):  Temp: 97.5 °F (36.4 °C) (20 1603)  Pulse: 74 (20 1603)  Resp: 18 (20 1603)  BP: (!) 172/72 (20 1603)  SpO2: 97 % (20 1603) Vital Signs (24h Range):  Temp:  [97.5 °F (36.4 °C)-98.2 °F (36.8 °C)] 97.5 °F (36.4 °C)  Pulse:  [62-90] 74  Resp:  [12-20] 18  SpO2:  [96 %-100 %] 97 %  BP: (135-172)/(65-82) 172/72     Weight: 65.8 kg (145 lb 1 oz)  Body mass index is 24.9 kg/m².    Physical Exam  Vitals signs and nursing note reviewed.   Constitutional:       Appearance: She is well-developed.   HENT:      Head: Normocephalic and atraumatic.   Eyes:      Conjunctiva/sclera: Conjunctivae normal.      Pupils: Pupils are equal, round, and reactive to light.   Neck:      Musculoskeletal: Normal range of motion and neck supple.   Cardiovascular:      Rate and Rhythm: Normal rate and regular  rhythm.      Heart sounds: Normal heart sounds.   Pulmonary:      Effort: Pulmonary effort is normal.      Breath sounds: Normal breath sounds.   Abdominal:      General: Bowel sounds are normal.      Palpations: Abdomen is soft.   Musculoskeletal: Normal range of motion.   Skin:     General: Skin is warm and dry.   Neurological:      Mental Status: She is alert and oriented to person, place, and time.      Deep Tendon Reflexes: Reflexes are normal and symmetric.      Comments: Patient is alert and oriented to person, place and time.  Strength is full bilaterally; it is equal and 5/5 in bilateral upper and lower extremities. There is no pronator drift of outstretched arms. Light touch sense is intact. Slurred speech. Left side facial droop. Eyes: Weakness R adductors.     Psychiatric:         Behavior: Behavior normal.         Thought Content: Thought content normal.         Judgment: Judgment normal.          Significant Labs:   BMP:   Recent Labs   Lab 11/16/20  1327   GLU 88      K 5.1   *   CO2 21*   BUN 21   CREATININE 1.5*   CALCIUM 7.8*     CBC:   Recent Labs   Lab 11/16/20  1327   WBC 8.08   HGB 8.9*   HCT 28.6*   *     CMP:   Recent Labs   Lab 11/16/20  1327      K 5.1   *   CO2 21*   GLU 88   BUN 21   CREATININE 1.5*   CALCIUM 7.8*   PROT 6.0   ALBUMIN 2.8*   BILITOT 0.2   ALKPHOS 64   AST 45*   ALT 28   ANIONGAP 5*   EGFRNONAA 33*     Lipid Panel: No results for input(s): CHOL, HDL, LDLCALC, TRIG, CHOLHDL in the last 48 hours.  Troponin:   Recent Labs   Lab 11/16/20  1327   TROPONINI 0.021     TSH:   Recent Labs   Lab 11/16/20  1327   TSH 4.087*     Urine Studies: No results for input(s): COLORU, APPEARANCEUA, PHUR, SPECGRAV, PROTEINUA, GLUCUA, KETONESU, BILIRUBINUA, OCCULTUA, NITRITE, UROBILINOGEN, LEUKOCYTESUR, RBCUA, WBCUA, BACTERIA, SQUAMEPITHEL, HYALINECASTS in the last 48 hours.    Invalid input(s): WRIGHTSUR  All pertinent labs within the past 24 hours have been  reviewed.    Significant Imaging:   Imaging Results          X-Ray Chest AP Portable (Final result)  Result time 11/16/20 13:47:55    Final result by Vinnie Amezcua MD (11/16/20 13:47:55)                 Impression:      1.  Negative for acute process involving the chest.    2.  Stable findings as noted above.      Electronically signed by: Vinnie Amezcua MD  Date:    11/16/2020  Time:    13:47             Narrative:    EXAMINATION:  XR CHEST AP PORTABLE    CLINICAL HISTORY:  Stroke;    COMPARISON:  August 4, 2017    FINDINGS:  The study is lordotic in position.  EKG leads overlie.  There is a similar degree of mild reticular interstitial changes throughout the right greater than left lungs.  The lungs are free of new pulmonary opacities.  The cardiac silhouette size is normal. The trachea is midline and the mediastinal width is normal. Negative for focal infiltrate, effusion or pneumothorax. Pulmonary vasculature is normal. Negative for osseous abnormalities. Stable elevation of the right hemidiaphragm, dual lead left subclavian pacemaker, and postoperative changes in the although in the epigastric region with a ring like device in place.  Tortuous aorta with calcifications of the aortic knob.  There are degenerative changes of the spine and both shoulder girdles.  Stable convex right curvature of the thoracic spine.                               CT Head Without Contrast (Final result)  Result time 11/16/20 13:45:19    Final result by Nima Abdullahi MD (11/16/20 13:45:19)                 Impression:      Chronic microvascular ischemic changes.  No evidence of intracranial hemorrhage.  If there is additional clinical concern for acute infarct, MRI with diffusion weighted imaging recommended.    Trace right mastoid effusion.      Electronically signed by: Nima Abdullahi MD  Date:    11/16/2020  Time:    13:45             Narrative:    EXAMINATION:  CT HEAD WITHOUT CONTRAST    CLINICAL HISTORY:  Neuro deficit, acute,  stroke suspected;    TECHNIQUE:  Low dose axial CT images obtained throughout the head without intravenous contrast. Sagittal and coronal reconstructions were performed.  All CT scans at this facility use dose modulation, iterative reconstruction and/or weight based dosing when appropriate to reduce radiation dose to as low as reasonably achievable.    COMPARISON:  None.    FINDINGS:  Intracranial compartment:    The brain parenchyma demonstrates areas of decreased attenuation with mild to moderate periventricular white matter consistent with chronic microvascular ischemic changes..  No parenchymal mass, hemorrhage, edema or major vascular distribution infarct.  Vascular calcifications are noted.    Mild prominence of the sulci and ventricles are consistent with age-related involutional changes.    No extra-axial blood or fluid collections.    Skull/extracranial contents (limited evaluation): No fracture.  Trace right mastoid effusion.  Left mastoid air cells and paranasal sinuses are essentially clear.                              Assessment/Plan:     * Stroke  CVA r/o- Admit  CT head negative for acute findings   Neuro Consult  Neuro checks  Unable to perform MRI Brain, MRA Brain and neck due to pacemaker implant.   Bilateral Carotid US   Lipid panel  ASA, Plavix, and Statin   Pt failed bedside swallow study   Allow Permissive HTN, PT/OT/ST.   Place NGT for medication       Chronic diastolic heart failure  Compensated   CXR clear   , chronically elevated   No rales or edema appreciated       Stage 3 chronic kidney disease  Renal fx stable   Daily BMP       Diffuse large B cell lymphoma   Followed outpatient by Dr. Matson      Coronary artery disease : multiple vessels, s/p PTCA  Resume home meds  Stable       Hypothyroidism (acquired)  TSH 4.087  Pt reports not taking levothyroxine   Resume levothyroxine       Mixed hyperlipidemia  Pt failed swallow study   ST consult   NGT ordered   Resume statin      Essential hypertension  Allow permissive HTN   IV Labetalol prn for SBP>220 or DBP >110        VTE Risk Mitigation (From admission, onward)         Ordered     enoxaparin injection 30 mg  Every 24 hours      11/16/20 1612     IP VTE HIGH RISK PATIENT  Once      11/16/20 1612     Place sequential compression device  Until discontinued      11/16/20 1612                   Carleen Roman NP  Department of Hospital Medicine   [facility]

## 2020-11-17 NOTE — PT/OT/SLP EVAL
Speech Language Pathology Evaluation  Bedside Swallow    Patient Name:  Violet Swenson   MRN:  [id number]  Admitting Diagnosis: Stroke    Recommendations:                 General Recommendations:  Follow-up not indicated  Diet recommendations:  Regular, Thin   Aspiration Precautions: 1 bite/sip at a time and HOB to 90 degrees   General Precautions: Standard,    Communication strategies:  none    History:     Past Medical History:   Diagnosis Date    Age-related osteoporosis without current pathological fracture 8/20/2018    Anemia     Anxiety     Arthritis     Atrial flutter     Cancer     lymphoma Large cell B    CHF (congestive heart failure)     Chronic anemia 4/26/2017    Chronic midline low back pain with right-sided sciatica 8/20/2018    Coronary artery disease     01/2015 [facility] patent LCX. 50% stenosis in LAD and RCA.      Depression     Disorder of kidney and ureter     Encounter for blood transfusion     GERD (gastroesophageal reflux disease)     Gout, arthritis     Heart failure     Hx of psychiatric care     Hyperlipidemia     Hypertension     Hypothyroidism     Immune deficiency disorder     Kidney disease     Lung nodule 2014    RML--stable    Obesity     Pacemaker     Metronic    Paroxysmal atrial fibrillation 3/15/2018    Pneumonia     Polyneuropathy     chemo induced    Psychiatric problem     Stroke 11/16/2020    Tobacco dependence     quit 1976    Trouble in sleeping        Past Surgical History:   Procedure Laterality Date    APPENDECTOMY  1966 approx    CARDIAC PACEMAKER PLACEMENT  01/22/2015    CHOLECYSTECTOMY  1993    incidental at time of gastric bypass    COLON SURGERY Right 2017    hemicolectomy    COLONOSCOPY N/A 4/6/2017    Procedure: COLONOSCOPY;  Surgeon: Tye Enamorado MD;  Location: [facility];  Service: Endoscopy;  Laterality: N/A;    COLONOSCOPY N/A 11/28/2018    Procedure: COLONOSCOPY;  Surgeon: Saúl Arthur III, MD;  Location: [facility];  Service: Endoscopy;  Laterality: N/A;    CORONARY ANGIOPLASTY  02/2014    CORONARY STENT PLACEMENT  02/05/2014    ESOPHAGOGASTRODUODENOSCOPY N/A 11/28/2018    Procedure: EGD (ESOPHAGOGASTRODUODENOSCOPY);  Surgeon: Saúl Arthur III, MD;  Location: [facility] ENDO;  Service: Endoscopy;  Laterality: N/A;    GASTRIC BYPASS  1993    with incidental choly    HERNIA REPAIR      INJECTION OF ANESTHETIC AGENT INTO SACROILIAC JOINT Right 10/8/2020    Procedure: Right BLOCK, SACROILIAC JOINT with RN IV sedation;  Surgeon: Madi Anton MD;  Location: [facility] PAIN MGT;  Service: Pain Management;  Laterality: Right;    JOINT REPLACEMENT Bilateral 2009    3 months apart    TONSILLECTOMY  1959       Social History: Patient lives with her spouse.    Prior Intubation HX:      Modified Barium Swallow: not warranted    Chest X-Rays:     Prior diet: regular.    Occupation/hobbies/homemaking:     Subjective     Pt pleasant and denies speech difficulty.   Patient goals: none stated    Pain/Comfort:  · Pain Rating 1: 0/10  · Pain Rating Post-Intervention 1: 0/10    Objective:     Oral Musculature Evaluation  · Oral Musculature: WFL  · Dentition: present and adequate  · Mucosal Quality: good    Bedside Swallow Eval:   Consistencies Assessed:  · Thin liquids, puree, solids     Oral Phase:   · WFL    Pharyngeal Phase:   · no overt clinical signs/symptoms of aspiration  · no overt clinical signs/symptoms of pharyngeal dysphagia    Compensatory Strategies  · None    Treatment:     Assessment:     Violet Swenson is a 80 y.o. female with an SLP diagnosis of functional swallow.  She presents with no difficulty with any consistency and speech is intelligible. No further ST is warranted at this time.     Goals:   Multidisciplinary Problems     SLP Goals     Not on file                Plan:     · Patient to be seen:      · Plan of Care expires:     · Plan of Care reviewed with:  patient   · SLP Follow-Up:  No       Discharge  recommendations:      Barriers to Discharge:      Time Tracking:     SLP Treatment Date:   11/17/20  Speech Start Time:  1030  Speech Stop Time:  1044     Speech Total Time (min):  14 min    Billable Minutes: Eval Swallow and Oral Function 14    Nimisha Richey CCC-SLP  11/17/2020

## 2020-11-17 NOTE — NURSING
X-ray for NG tube placement abnormal, need to reposition.  Called into pt's room by PCT and found NG tube slipping out and the tip coming out of pt's mouth. NG tube removed and will continue to monitor.

## 2020-11-17 NOTE — PLAN OF CARE
SW met with pt at bedside to complete discharge assessment. Pt lives at home with her spouse. Pt help at home with her spouse. Pt family will pick her up when she is discharged. Pt has a BSW, RW, and Rollator at home that she use as needed. Pt asked for a wheelchair. No other needs at this time. SW provided a transitional care folder, information on advanced directives, information on pharmacy bedside delivery, and discharge planning begins on admission with contact information for any needs/questions. Pt board updated with SW name and number.     Payor: [employer] MEDICARE / Plan: [employer] MEDICARE HMO / Product Type: Capitation /   PCP: Marti Coronel MD  Pharmacy:   [facility] - [place], [place] - [address]   [address] [facility] [address]  Phone: [phone] Fax: [phone]  Bedside Delivery: No  MyChart: Declined       11/17/20 1119   Discharge Assessment   Assessment Type Discharge Planning Assessment   Confirmed/corrected address and phone number on facesheet? Yes   Assessment information obtained from? Patient   Expected Length of Stay (days) 1   Communicated expected length of stay with patient/caregiver yes   Prior to hospitilization cognitive status: Alert/Oriented   Prior to hospitalization functional status: Independent   Current cognitive status: Alert/Oriented   Current Functional Status: Independent   Facility Arrived From: home   Lives With spouse   Able to Return to Prior Arrangements yes   Is patient able to care for self after discharge? Yes   Who are your caregiver(s) and their phone number(s)? Lenin Swenson ([facility]0 [phone]   Patient's perception of discharge disposition home or selfcare   Readmission Within the Last 30 Days no previous admission in last 30 days   Patient currently being followed by outpatient case management? No   Patient currently receives any other outside agency services? No   Equipment Currently Used at Home none   Part D Coverage  n/a   Do you have any problems affording any of your prescribed medications? No   Is the patient taking medications as prescribed? yes   Does the patient have transportation home? Yes   Transportation Anticipated family or friend will provide   Dialysis Name and Scheduled days n/a   Does the patient receive services at the [facility]? No   Discharge Plan A Home Health;Home with family   Discharge Plan B Home with family;Home Health   DME Needed Upon Discharge  wheelchair   Patient/Family in Agreement with Plan yes   Does the patient have transportation to healthcare appointments? Yes       Sotero Carson LMSW 11/17/2020 3:23 PM

## 2020-11-17 NOTE — PLAN OF CARE
According to Kelly Fitzgerald The patient doesn't qualify for a WC because OT eval notes and other notes documented state that she's able to ambulate with a rolling walker.patient currently doesn't qualify for a rolling walker because she got a rollator walker from [facility] in 2017.    Sotero Carson LMSW 11/17/2020 3:28 PM

## 2020-11-17 NOTE — CONSULTS
[facility] -   Neurology  Consult Note    Patient Name: Violet Swenson  MRN: [id number]  Admission Date: 11/16/2020  Hospital Length of Stay: 0 days  Code Status: Full Code   Attending Provider: Jesús Carranza MD   Consulting Provider: Gregor Prieto MD  Primary Care Physician: Marti Coronel MD  Principal Problem:Stroke    Consults  Subjective:     Chief Complaint:  Speech difficulty, double vision and trouble walking     HPI: The patient states and her medical history in the chart confirms that the patient had presented to the hospital with the above complaints, first noted when she awakened on 11/15/2020.  The patient states that today, her speech has returned to normal, but that she continues to experience diplopia on left lateral gaze, and continues to feel awkward with ambulation.  She denies difficulty swallowing today, and has had ST/PT/OT evaluation completed.  The patient is requesting discharge to home.    CT scan brain times two does not show any acute changes.  MRI is not possible due to her pacemaker.    Past Medical History:   Diagnosis Date    Age-related osteoporosis without current pathological fracture 8/20/2018    Anemia     Anxiety     Arthritis     Atrial flutter     Cancer     lymphoma Large cell B    CHF (congestive heart failure)     Chronic anemia 4/26/2017    Chronic midline low back pain with right-sided sciatica 8/20/2018    Coronary artery disease     01/2015 [facility] patent LCX. 50% stenosis in LAD and RCA.      Depression     Disorder of kidney and ureter     Encounter for blood transfusion     GERD (gastroesophageal reflux disease)     Gout, arthritis     Heart failure     Hx of psychiatric care     Hyperlipidemia     Hypertension     Hypothyroidism     Immune deficiency disorder     Kidney disease     Lung nodule 2014    RML--stable    Obesity     Pacemaker     Metronic    Paroxysmal atrial fibrillation 3/15/2018    Pneumonia      Polyneuropathy     chemo induced    Psychiatric problem     Stroke 11/16/2020    Tobacco dependence     quit 1976    Trouble in sleeping        Past Surgical History:   Procedure Laterality Date    APPENDECTOMY  1966 approx    CARDIAC PACEMAKER PLACEMENT  01/22/2015    CHOLECYSTECTOMY  1993    incidental at time of gastric bypass    COLON SURGERY Right 2017    hemicolectomy    COLONOSCOPY N/A 4/6/2017    Procedure: COLONOSCOPY;  Surgeon: Tye Enamorado MD;  Location: [facility];  Service: Endoscopy;  Laterality: N/A;    COLONOSCOPY N/A 11/28/2018    Procedure: COLONOSCOPY;  Surgeon: Saúl Arthur III, MD;  Location: [facility];  Service: Endoscopy;  Laterality: N/A;    CORONARY ANGIOPLASTY  02/2014    CORONARY STENT PLACEMENT  02/05/2014    ESOPHAGOGASTRODUODENOSCOPY N/A 11/28/2018    Procedure: EGD (ESOPHAGOGASTRODUODENOSCOPY);  Surgeon: Saúl Arthur III, MD;  Location: [facility];  Service: Endoscopy;  Laterality: N/A;    GASTRIC BYPASS  1993    with incidental choly    HERNIA REPAIR      INJECTION OF ANESTHETIC AGENT INTO SACROILIAC JOINT Right 10/8/2020    Procedure: Right BLOCK, SACROILIAC JOINT with RN IV sedation;  Surgeon: Madi Anton MD;  Location: [facility];  Service: Pain Management;  Laterality: Right;    JOINT REPLACEMENT Bilateral 2009    3 months apart    TONSILLECTOMY  1959       Review of patient's allergies indicates:   Allergen Reactions    Corticosteroids (glucocorticoids) Nausea Only and Other (See Comments)     Stomach pain, dizziness, headache    Oxycodone Other (See Comments)     Blood pressure dropped       Current Neurological Medications: See below    No current facility-administered medications on file prior to encounter.      Current Outpatient Medications on File Prior to Encounter   Medication Sig    aspirin (ECOTRIN) 81 MG EC tablet Take 81 mg by mouth nightly.     busPIRone (BUSPAR) 7.5 MG tablet TAKE ONE TABLET BY MOUTH THREE TIMES DAILY     calcitRIOL (ROCALTROL) 0.25 MCG Cap TAKE ONE CAPSULE BY MOUTH EVERY MONDAY, WEDNESDAY AND FRIDAY    clopidogrel (PLAVIX) 75 mg tablet TAKE ONE TABLET BY MOUTH EVERY DAY    DULoxetine (CYMBALTA) 30 MG capsule Take 1 capsule (30 mg total) by mouth once daily.    gabapentin (NEURONTIN) 300 MG capsule TAKE ONE CAPSULE BY MOUTH THREE TIMES DAILY    HYDROcodone-acetaminophen (NORCO)  mg per tablet Take 1 tablet by mouth every 6 (six) hours as needed for Pain.    iron-vitamin C 100-250 mg, ICAR-C, 100-250 mg Tab TAKE ONE TABLET BY MOUTH EVERY DAY    levothyroxine (SYNTHROID) 75 MCG tablet TAKE ONE TABLET BY MOUTH EVERY DAY BEFORE BREAKFAST    meloxicam (MOBIC) 7.5 MG tablet TAKE ONE TABLET BY MOUTH EVERY DAY AS NEEDED FOR PAIN    metoprolol tartrate (LOPRESSOR) 25 MG tablet TAKE ONE TABLET BY MOUTH TWICE DAILY    mirtazapine (REMERON SOL-TAB) 15 MG disintegrating tablet DISSOLVE ONE TABLET BY MOUTH NIGHTLY    pravastatin (PRAVACHOL) 40 MG tablet TAKE ONE TABLET BY MOUTH ONCE DAILY    sertraline (ZOLOFT) 100 MG tablet TAKE 1 & 1/2 TABLETS BY MOUTH EVERY DAY    sodium bicarbonate 650 MG tablet TAKE ONE TABLET BY MOUTH TWICE DAILY    traMADoL (ULTRAM) 50 mg tablet Take 1 tablet (50 mg total) by mouth every 12 (twelve) hours as needed for Pain.    VITAMIN D2 1,250 mcg (50,000 unit) capsule Take 1 capsule (50,000 Units total) by mouth every 7 days.    ascorbic acid, vitamin C, (VITAMIN C) 100 MG tablet Take by mouth once daily.    diclofenac sodium 1 % Gel Apply 2 g topically once daily. Apply 2 g over painful joints once or twice a day.    ergocalciferol, vitamin D2, 400 unit Tab Take by mouth.    flu vac 2020 [id number],PF, (FLUAD QUAD 2020-21,65Y UP,,PF,) 60 mcg (15 mcg x 4)/0.5 mL Syrg Inject 0.5 mLs into the muscle.    fluocinolone (SYNALAR) 0.01 % external solution AAA of scalp twice a day prn itching.    fluticasone propionate (FLONASE) 50 mcg/actuation nasal spray 2 sprays (100 mcg total) by  Each Nare route once daily.    levocetirizine (XYZAL) 5 MG tablet Take 1 tablet (5 mg total) by mouth every evening.    multivitamin (ONE DAILY MULTIVITAMIN) per tablet Take 1 tablet by mouth once daily.    ranitidine (ZANTAC) 150 MG capsule Take 150 mg by mouth nightly.     sodium zirconium cyclosilicate (LOKELMA) 5 gram packet Take 1 packet (5 g total) by mouth once daily. Mix entire contents of packet(s) into drinking glass containing >3 tablespoons of water; stir well and drink immediately. If powder remains in the drinking glass, add water and repeat until no powder remains to ensure entire dose is taken.    tretinoin (RETIN-A) 0.025 % cream Apply a pea-sized amount to the entire face at bedtime.  Use every third night and increase as tolerated to nightly.    triamcinolone acetonide 0.025% (KENALOG) 0.025 % cream Apply topically 2 (two) times daily. PRN itching.    ZINC ACETATE ORAL Take 250 mg by mouth once daily.       Family History     Problem Relation (Age of Onset)    Asthma Daughter    COPD Daughter    Cancer Father, Sister, Brother, Son    Cataracts Mother, Brother    Drug abuse Son    Heart disease Mother    Hypertension Mother, Maternal Grandfather    Leukemia Brother    Pancreatic cancer Sister    Prostate cancer Son    Stomach cancer Father    Stroke Maternal Grandfather        Tobacco Use    Smoking status: Former Smoker     Packs/day: 2.00     Years: 6.00     Pack years: 12.00     Quit date: 3/15/1976     Years since quittin.7    Smokeless tobacco: Never Used   Substance and Sexual Activity    Alcohol use: No     Alcohol/week: 0.0 standard drinks    Drug use: No    Sexual activity: Never     Review of Systems     ROS:  GENERAL: No fever, chills, or weight loss.  SKIN: No rashes, itching or changes in color or texture of skin.  HEAD: See comments in present illness  EYES: No photophobia, ocular pain   EARS: Denies ear pain, discharge or vertigo.  NOSE: No loss of smell, no  epistaxis or postnasal drip.  MOUTH & THROAT: No excessive gum bleeding.  NODES: Denies swollen glands.  CHEST: Denies ESCOBAR, cyanosis, wheezing, cough and sputum production.  CARDIOVASCULAR: Denies chest pain, PND, orthopnea or reduced exercise tolerance.  ABDOMEN: Appetite fine. No weight loss. Denies diarrhea, abdominal pain, hematemesis or blood in stool.  URINARY: No flank pain, dysuria or hematuria.  PERIPHERAL VASCULAR: No claudication or cyanosis.  MUSCULOSKELETAL: No joint stiffness or swelling. Denies back pain.  NEUROLOGIC: No history of seizures,      Objective:     Vital Signs (Most Recent):  Temp: 97.5 °F (36.4 °C) (11/17/20 0747)  Pulse: 69 (11/17/20 0747)  Resp: 18 (11/17/20 0747)  BP: (!) 145/69 (11/17/20 0747)  SpO2: 98 % (11/17/20 0747) Vital Signs (24h Range):  Temp:  [96.4 °F (35.8 °C)-98.2 °F (36.8 °C)] 97.5 °F (36.4 °C)  Pulse:  [62-90] 69  Resp:  [12-20] 18  SpO2:  [96 %-100 %] 98 %  BP: (135-172)/(65-82) 145/69     Weight: 65.8 kg (145 lb)  Body mass index is 24.89 kg/m².    Physical Exam   APPEARANCE: Well nourished, well developed, in no acute distress.    HEAD: Normocephalic, atraumatic.  EYES: PERRL. EOMI.  Non-icteric sclerae.    EARS: TM's intact. Light reflex normal. No retraction or perforation.    NOSE: NG tube in place.  MOUTH & THROAT: No tonsillar enlargement.  Mucous membranes moist.  NECK: Supple. No bruits.  CHEST: Lungs clear to auscultation.  CARDIOVASCULAR: Regular rhythm without significant murmurs.  MUSCULOSKELETAL:  No bony deformity seen.   NEUROLOGIC:   Mental Status:  The patient is well oriented to person, time, place, and situation.  The patient is attentive to the environment and cooperative for the exam.  Cranial Nerves: Fundoscopic exam is normal.  No hemorrhage, exudate or papilledema is present. The patient has a partial left VI paresis with failure of adduction of the left eye on left lateral gaze.  No ptosis is present. Facial features are symmetrical.  Speech  is normal in fluency, diction, and phrasing.  Tongue protrudes in the midline.    Gait and Station:  Romberg is positive. Slight ataxia, with better ambulation with a walker.  Motor:  No downdrift of either arm when held at shoulder level.  Manual muscle testing of proximal and distal muscles of both upper and lower extremities is normal. Muscle mass and muscle tone are normal both upper and both lower extremities.  Sensory:  Intact both upper and lower extremities to pin prick, touch, and vibration.  Cerebellar:  Finger to nose done well.  Alternating movements intact.  No involuntary movements or tremor seen.  Reflexes:  Stretch reflexes are trace both upper and lower extremities.  Plantar stimulation is flexor bilaterally and no pathological reflexes are seen              Significant Labs:   CBC:   Recent Labs   Lab 11/16/20  1327 11/17/20  0654   WBC 8.08 7.50   HGB 8.9* 9.2*   HCT 28.6* 29.9*   * 391*     CMP:   Recent Labs   Lab 11/16/20  1327 11/17/20  0654   GLU 88 73    140   K 5.1 5.5*   * 114*   CO2 21* 20*   BUN 21 18   CREATININE 1.5* 1.3   CALCIUM 7.8* 8.0*   MG  --  2.1   PROT 6.0 6.0   ALBUMIN 2.8* 2.7*   BILITOT 0.2 0.3   ALKPHOS 64 74   AST 45* 43*   ALT 28 25   ANIONGAP 5* 6*   EGFRNONAA 33* 39*     All pertinent lab results from the past 24 hours have been reviewed.    Significant Imaging: I have reviewed and interpreted all pertinent imaging results/findings within the past 24 hours.    Assessment and Plan:     The patient has evidence of brainstem stroke with a partial left VI cranial nerve paresis and slight ataxia.  She is able to swallow and communication skills are back to baseline.  She can go home with outpatient PT for gait training.  She will need a walker for home use.  Agree with ASA/Plavix for 30 days, then ASA 81 mg/day thereafter.      Active Diagnoses:    Diagnosis Date Noted POA    PRINCIPAL PROBLEM:  Stroke [I63.9] 11/16/2020 Yes    Chronic diastolic heart  failure [I50.32] 11/16/2020 Yes    Stage 3 chronic kidney disease [N18.30] 02/14/2018 Yes     Chronic    Diffuse large B cell lymphoma [C83.30] 04/26/2017 Yes    Coronary artery disease : multiple vessels, s/p PTCA [I25.10] 11/14/2016 Yes     Chronic    Pacemaker [Z95.0] 02/02/2016 Yes     Chronic    Mixed hyperlipidemia [E78.2] 02/01/2016 Yes     Chronic    Essential hypertension [I10] 02/01/2016 Yes     Chronic    Hypothyroidism (acquired) [E03.9] 02/01/2016 Yes     Chronic      Problems Resolved During this Admission:       VTE Risk Mitigation (From admission, onward)         Ordered     enoxaparin injection 30 mg  Every 24 hours      11/16/20 1612     IP VTE HIGH RISK PATIENT  Once      11/16/20 1612     Place sequential compression device  Until discontinued      11/16/20 1612                Thank you for your consult. I will sign off. Please contact us if you have any additional questions.    Gregor Prieto MD  Neurology  [facility] -

## 2020-11-17 NOTE — TELEPHONE ENCOUNTER
----- Message from Fanny Galeano RN sent at 11/17/2020  3:17 PM CST -----  Regarding: post hospitalization follow up needed in 2 weeks  Post stroke follow up needed in 2 weeks

## 2020-11-17 NOTE — PLAN OF CARE
11/17/20 1609   Final Note   Assessment Type Final Discharge Note   Anticipated Discharge Disposition Home   Right Care Referral Info   Post Acute Recommendation No Care       Sotero Carson LMSW 11/17/2020 4:09 PM

## 2020-11-17 NOTE — PT/OT/SLP EVAL
Occupational Therapy   Evaluation    Name: Violet Swenson  MRN: [id number]  Admitting Diagnosis:  Stroke      Recommendations:     Discharge Recommendations:    Discharge Equipment Recommendations:  none  Barriers to discharge:       Assessment:     Violet Swenson is a 80 y.o. female with a medical diagnosis of Stroke.  She presents with SLIGHT DEBILITY AND GENERALIZED WEAKNESS. Performance deficits affecting function: weakness, impaired endurance, impaired self care skills, decreased upper extremity function, impaired functional mobilty, decreased lower extremity function, gait instability, pain.      Rehab Prognosis: Good; patient would benefit from acute skilled OT services to address these deficits and reach maximum level of function.       Plan:     Patient to be seen   to address the above listed problems via self-care/home management, therapeutic activities, therapeutic exercises  · Plan of Care Expires: 11/24/20  · Plan of Care Reviewed with: patient    Subjective     Chief Complaint: SLIGHT DEBILITY AND GENERALIZED WEAKNESS  Patient/Family Comments/goals:     Occupational Profile:  Living Environment: LIVES SPOUSE IN 1 STORY HOUSE WITH NO STEPS TO ENTER  Previous level of function: (I) WITH ADL'S AND FUNCTIONAL  MOBILITY. PT REPORTS STILL DRIVES  Roles and Routines: OCCUPATIONAL THERAPY  Equipment Used at Home:  cane, straight  Assistance upon Discharge:     Pain/Comfort:  · Pain Rating 1: 0/10    Patients cultural, spiritual, Hinduism conflicts given the current situation:      Objective:     Communicated with: NURSE AND Epic CHART REVIEW prior to session.  Patient found up in chair with   upon OT entry to room.    General Precautions: Standard, fall   Orthopedic Precautions:N/A   Braces: N/A     Occupational Performance:    Bed Mobility:    · Patient completed Rolling/Turning to Right with stand by assistance  · Patient completed Scooting/Bridging with stand by assistance  · Patient  completed Supine to Sit with stand by assistance    Functional Mobility/Transfers:  · Patient completed Sit <> Stand Transfer with contact guard assistance  with  rolling walker   · Patient completed Bed <> Chair Transfer using Step Transfer technique with minimum assistance with rolling walker  · Functional Mobility: PT AMBULATED 50 FEET WITH ROLLING WALKER AND MIN A AND 50 HAND HELD ASSIST X MOD A X SEVERAL LOB    Activities of Daily Living:  · Lower Body Dressing: contact guard assistance .    Cognitive/Visual Perceptual:  Cognitive/Psychosocial Skills:     -       Oriented to: Person, Place, Time and Situation   -       Follows Commands/attention:Follows multistep  commands  -       Communication: clear/fluent  -       Memory: No Deficits noted  -       Safety awareness/insight to disability: impaired   Visual/Perceptual:      -IMPAIRED .    Physical Exam:  Upper Extremity Range of Motion:     -       Right Upper Extremity: WFL  -       Left Upper Extremity: WFL  Upper Extremity Strength:    -       Right Upper Extremity: MMT: 4/5 GROSSLY  -       Left Upper Extremity: MMT: 4/5 GROSSLY   Strength:    -       Right Upper Extremity: WFL  -       Left Upper Extremity: WFL    AMPAC 6 Click ADL:  AMPAC Total Score: 20    Treatment & Education:    Education:  OT EVAL COMPLETED AS DESCRIBE AS ABOVE. PT EDUCATED ON OT POC. PT EDUCATED ON HEP AND VERBALIZED UNDERSTANDING AND RETURNED DEMONSTRATION. PT DISPLAYED DEFICITS WITH FUNCTIONAL MOBILITY. PT ALSO EDUCATED REMAIN OOB IN BED SIDE CHAIR TO INCREASE OVERALL ENDURANCE.     Patient left up in chair with all lines intact, call button in reach, NURSE notified and DR RASHEED present    GOALS:   Multidisciplinary Problems     Occupational Therapy Goals        Problem: Occupational Therapy Goal    Goal Priority Disciplines Outcome Interventions   Occupational Therapy Goal     OT, PT/OT     Description: OT GOALS TO BE MET BY 11-24-20  PT WILL   TOLERATE 1 SET C 15 REPS B  UE ROM EXERCISE  MOD (I) WITH LE DRESSING  MOD (I) WITH UE DRESSING  MOD (I) WITH TOILET T/F'S                   History:     Past Medical History:   Diagnosis Date    Age-related osteoporosis without current pathological fracture 8/20/2018    Anemia     Anxiety     Arthritis     Atrial flutter     Cancer     lymphoma Large cell B    CHF (congestive heart failure)     Chronic anemia 4/26/2017    Chronic midline low back pain with right-sided sciatica 8/20/2018    Coronary artery disease     01/2015 [facility] patent LCX. 50% stenosis in LAD and RCA.      Depression     Disorder of kidney and ureter     Encounter for blood transfusion     GERD (gastroesophageal reflux disease)     Gout, arthritis     Heart failure     Hx of psychiatric care     Hyperlipidemia     Hypertension     Hypothyroidism     Immune deficiency disorder     Kidney disease     Lung nodule 2014    RML--stable    Obesity     Pacemaker     Metronic    Paroxysmal atrial fibrillation 3/15/2018    Pneumonia     Polyneuropathy     chemo induced    Psychiatric problem     Stroke 11/16/2020    Tobacco dependence     quit 1976    Trouble in sleeping        Past Surgical History:   Procedure Laterality Date    APPENDECTOMY  1966 approx    CARDIAC PACEMAKER PLACEMENT  01/22/2015    CHOLECYSTECTOMY  1993    incidental at time of gastric bypass    COLON SURGERY Right 2017    hemicolectomy    COLONOSCOPY N/A 4/6/2017    Procedure: COLONOSCOPY;  Surgeon: Tye Enamorado MD;  Location: [facility];  Service: Endoscopy;  Laterality: N/A;    COLONOSCOPY N/A 11/28/2018    Procedure: COLONOSCOPY;  Surgeon: Saúl Arthur III, MD;  Location: [facility];  Service: Endoscopy;  Laterality: N/A;    CORONARY ANGIOPLASTY  02/2014    CORONARY STENT PLACEMENT  02/05/2014    ESOPHAGOGASTRODUODENOSCOPY N/A 11/28/2018    Procedure: EGD (ESOPHAGOGASTRODUODENOSCOPY);  Surgeon: Saúl Arthur III, MD;  Location: [facility];  Service: Endoscopy;   Laterality: N/A;    GASTRIC BYPASS  1993    with incidental choly    HERNIA REPAIR      INJECTION OF ANESTHETIC AGENT INTO SACROILIAC JOINT Right 10/8/2020    Procedure: Right BLOCK, SACROILIAC JOINT with RN IV sedation;  Surgeon: Madi Anton MD;  Location: [facility];  Service: Pain Management;  Laterality: Right;    JOINT REPLACEMENT Bilateral 2009    3 months apart    TONSILLECTOMY  1959       Time Tracking:     OT Date of Treatment: 11/17/20  OT Start Time: 1112  OT Stop Time: 1135  OT Total Time (min): 23 min    Billable Minutes:Evaluation 10 MINUTES  Therapeutic Activity 13 MINUTES    Laura Barber OT  11/17/2020

## 2020-11-17 NOTE — PLAN OF CARE
11/17/20 1533   Post-Acute Status   Post-Acute Authorization Home Health   Home Health Status Referrals Sent   Discharge Plan   Discharge Plan A Home with family;Home Health   Discharge Plan B Home with family;Home Health       Sotero Carson LMSW 11/17/2020 3:34 PM

## 2020-11-18 ENCOUNTER — DOCUMENTATION ONLY (OUTPATIENT)
Dept: REHABILITATION | Facility: HOSPITAL | Age: 80
End: 2020-11-18

## 2020-11-18 NOTE — PT/OT/SLP EVAL
Physical Therapy Evaluation    Patient Name:  Violet Swenson   MRN:  [id number]    Recommendations:     Discharge Recommendations:  home health PT   Discharge Equipment Recommendations: walker, rolling   Barriers to discharge: None    Assessment:     Violet Swenson is a 80 y.o. female admitted with a medical diagnosis of Stroke.  She presents with the following impairments/functional limitations:  weakness, impaired functional mobilty, gait instability, impaired endurance, impaired balance, impaired self care skills, decreased lower extremity function, visual deficits .    Rehab Prognosis: Good; patient would benefit from acute skilled PT services to address these deficits and reach maximum level of function.    Recent Surgery: * No surgery found *      Plan:     During this hospitalization, patient to be seen   to address the identified rehab impairments via gait training, therapeutic activities, therapeutic exercises and progress toward the following goals:    · Plan of Care Expires:  11/24/20    Subjective     Chief Complaint: DOUBLE VISION  Patient/Family Comments/goals: IMPROVE BALANCE   Pain/Comfort:  · Pain Rating 1: 0/10  · Pain Rating Post-Intervention 1: 0/10    Patients cultural, spiritual, Baptism conflicts given the current situation:      Living Environment:  PT LIVES AT HOME WITH  AND HAS NO STEPS TO ENTER A ONE STORY HOME   Prior to admission, patients level of function was IND AND DRIVES .  Equipment used at home: none.  DME owned (not currently used): rolling walker, bedside commode, shower chair and ROLLATOR.  Upon discharge, patient will have assistance from .    Objective:     Communicated with NURSE LOPEZ AND Epic CHART REVIEW  prior to session.  Patient found supine with peripheral IV  upon PT entry to room.    General Precautions: Standard, fall   Orthopedic Precautions:N/A   Braces: N/A     Exams:  · RLE ROM: WFL  · RLE Strength: WFL  · LLE ROM: WFL  · LLE  Strength: WFL    Functional Mobility:  PT MET IN RM SUP>SIT EOB IND. PT SCOOTED TO EOB AND SENSATION INTACT AND B LE WFL. PT STOOD AND MIP X 10 REPS WITH IMPAIRED BALANCE AND R LAT LEAN. PT GT TRAINED 1X180' WITHOUT RW AND HHA WITH MIN A AND ATAXIC GT. PT GIVEN TEMPORARY EYE PATCH TO ASSIST IN BALANCE. PT GT IMPROVED WITH PATCH. PT RETURNED TO RM T/F TO CHAIR WITH MIN A. PT LEFT SEATED AND EDUCATED ON ROLE OF P.T. AND TO CALL FOR ASSIST OOB.     AM-PAC 6 CLICK MOBILITY  Total Score:18     Patient left up in chair with call button in reach.    GOALS:   Multidisciplinary Problems     Physical Therapy Goals        Problem: Physical Therapy Goal    Goal Priority Disciplines Outcome Goal Variances Interventions   Physical Therapy Goal     PT, PT/OT      Description: PT WILL BE SEEN FOR P.T. FOR A MIN OF 5 OUT OF 7 DAYS A WEEK  LT20  1 PT WILL COMPLETE BED MOBILITY IND  2. PT WILL GT TRAIN X 250' WITH RW AND SBA  3. PT WILL COMPLETE B LE TE X 20 REPS FOR STRENGTHENING.                      History:     Past Medical History:   Diagnosis Date    Age-related osteoporosis without current pathological fracture 2018    Anemia     Anxiety     Arthritis     Atrial flutter     Cancer     lymphoma Large cell B    CHF (congestive heart failure)     Chronic anemia 2017    Chronic midline low back pain with right-sided sciatica 2018    Coronary artery disease     2015 [facility] patent LCX. 50% stenosis in LAD and RCA.      Depression     Disorder of kidney and ureter     Encounter for blood transfusion     GERD (gastroesophageal reflux disease)     Gout, arthritis     Heart failure     Hx of psychiatric care     Hyperlipidemia     Hypertension     Hypothyroidism     Immune deficiency disorder     Kidney disease     Lung nodule     RML--stable    Obesity     Pacemaker     Metronic    Paroxysmal atrial fibrillation 3/15/2018    Pneumonia     Polyneuropathy     chemo induced     Psychiatric problem     Stroke 11/16/2020    Tobacco dependence     quit 1976    Trouble in sleeping        Past Surgical History:   Procedure Laterality Date    APPENDECTOMY  1966 approx    CARDIAC PACEMAKER PLACEMENT  01/22/2015    CHOLECYSTECTOMY  1993    incidental at time of gastric bypass    COLON SURGERY Right 2017    hemicolectomy    COLONOSCOPY N/A 4/6/2017    Procedure: COLONOSCOPY;  Surgeon: Tye Enamorado MD;  Location: [facility];  Service: Endoscopy;  Laterality: N/A;    COLONOSCOPY N/A 11/28/2018    Procedure: COLONOSCOPY;  Surgeon: Saúl Arthur III, MD;  Location: [facility];  Service: Endoscopy;  Laterality: N/A;    CORONARY ANGIOPLASTY  02/2014    CORONARY STENT PLACEMENT  02/05/2014    ESOPHAGOGASTRODUODENOSCOPY N/A 11/28/2018    Procedure: EGD (ESOPHAGOGASTRODUODENOSCOPY);  Surgeon: Saúl Arthur III, MD;  Location: [facility];  Service: Endoscopy;  Laterality: N/A;    GASTRIC BYPASS  1993    with incidental choly    HERNIA REPAIR      INJECTION OF ANESTHETIC AGENT INTO SACROILIAC JOINT Right 10/8/2020    Procedure: Right BLOCK, SACROILIAC JOINT with RN IV sedation;  Surgeon: Madi Anton MD;  Location: [facility];  Service: Pain Management;  Laterality: Right;    JOINT REPLACEMENT Bilateral 2009    3 months apart    TONSILLECTOMY  1959       Time Tracking:     PT Received On: 11/17/20  PT Start Time: 1100     PT Stop Time: 1130  PT Total Time (min): 30 min     Billable Minutes: Evaluation 15 and Gait Training 15      Annelise Bedolla, PT  11/17/2020

## 2020-11-18 NOTE — PROGRESS NOTES
Outpatient Therapy Discharge Summary     Name: Violet Holdengeneva  Clinic Number: [id number]    Therapy Diagnosis: Decreased strength  Physician: Dilshad Rogers    Physician Orders: PT Eval and Treat   Medical Diagnosis from Referral: Chronic midline low back pain without sciatica  Evaluation Date: 9/16/2020    Date of Last visit: 10/7/20  Total Visits Received: 5  Cancelled Visits: 2  No Show Visits: 0    Assessment    Goals:     Short Term Goals: In 4 weeks   1.I with HEP NOT MET  2.Pt to increase lumbar ROM to 75% pain free NOT MET  3. Pt to increase BLE strength by 1/2 grade NOT MET  4.Pt to have pain less than 3/10 at all times. NOT MET     Long Term Goals: In 8 weeks  1.Pt to score less than 20% impaired on the foto NOT MET  2. Patient to demo increase in LE strength by 1 grade NOT MET  3. Patient to have decreased pain to 1/10 at all times. NOT MET  4. Patient to demo increase lumbar ROM to 90% pain free NOT MET  5. Patient to perform daily activities including walking without limitation. NOT MET    Discharge reason: Patient has not attended therapy since 10/7/20    Plan   This patient is discharged from Physical Therapy

## 2020-11-18 NOTE — HOSPITAL COURSE
Place an observation for evaluation and treatment of right arm weakness, slurred speech, and gait instability. Symptoms concerning for acute stroke. Initially perform a CT of the head which showed no acute findings. Patient was unable to perform an MRI or MRA due to having an implantable device. Empirically treated for transient ischemic attack versus stroke. Performed an interval CT scan of the head which was also negative. She was evaluated by neurology who assisted with management. Neurology determined that even though we were unable to get positive imaging findings that she should be treated as likely having had an acute stroke.  Physical and occupational therapy evaluated the patient and recommended outpatient therapy. Speech language pathology evaluated the patient and recommended no restrictions. Discharge plan to return home and continue medication plan outpatient physical therapy and follow-up with primary care as needed

## 2020-11-18 NOTE — DISCHARGE SUMMARY
"[facility] Hospital Medicine  Discharge Summary      Patient Name: Violet Swenson  MRN: [id number]  Admission Date: 11/16/2020  Hospital Length of Stay: 0 days  Discharge Date and Time: 11/17/2020  3:50 PM  Attending Physician:  Jesús Carranza MD  Discharging Provider: Jesús Carranza MD  Primary Care Provider: Marti Coronel MD      HPI:   Violet Swenson is a 80 y.o. female patient with a h/o anemia, anxiety, atrial flutter, lymphoma large cell B, CHF, CAD, depression, GERD, gout, heart failure, HLD, HTN, hypothyroidism kidney disease, lung nodule, obesity, metronic pacemaker, polyneuropathy, who presents to the Emergency Department for evaluation of fatigue which onset this morning. Per [facility], the pt has been intermittently stuttering her speech and was hypoglycemic upon arriving on scene. Pt's daughter states that the pt woke up and was chatting to her before she left the house around 0800 this morning. The pt reports falling back asleep and waking up around 0930 feeling very weak and fatigued. She states, "when I rolled over after waking up, it felt like someone was pushing me back in bed." Associated sxs include R arm weakness, generalized weakness, slurred speech, and trouble ambulating. Patient denies any fever, SOB, CP, n/v, numbness, dizziness, and all other sxs at this time. In the ED, H/H 8.9/28.6, Creatinine 1.5, , TSH 4.087, CT head with no acute findings. Tele-stroke recommended MRI/MRA brain, MRA neck lipid panel, hemoglobin A1c. Add ASA to Plavix if x 30 days if no contraindication. Atorvastatin 40 mg daily. Neurology, PT/speech consults. Patient placed in observation for CVA rule out under the care of hospital medicine.     * No surgery found *      Hospital Course:   Place an observation for evaluation and treatment of right arm weakness, slurred speech, and gait instability. Symptoms concerning for acute stroke. Initially perform a CT of the " head which showed no acute findings. Patient was unable to perform an MRI or MRA due to having an implantable device. Empirically treated for transient ischemic attack versus stroke. Performed an interval CT scan of the head which was also negative. She was evaluated by neurology who assisted with management. Neurology determined that even though we were unable to get positive imaging findings that she should be treated as likely having had an acute stroke.  Physical and occupational therapy evaluated the patient and recommended outpatient therapy. Speech language pathology evaluated the patient and recommended no restrictions. Discharge plan to return home and continue medication plan outpatient physical therapy and follow-up with primary care as needed     Consults:   Consults (From admission, onward)        Status Ordering Provider     IP consult to case management/social work  Once     Provider:  (Not yet assigned)    ARIANNA Handy          No new Assessment & Plan notes have been filed under this hospital service since the last note was generated.  Service: Hospital Medicine    Final Active Diagnoses:    Diagnosis Date Noted POA    PRINCIPAL PROBLEM:  Stroke [I63.9] 11/16/2020 Yes    Chronic diastolic heart failure [I50.32] 11/16/2020 Yes    Stage 3 chronic kidney disease [N18.30] 02/14/2018 Yes     Chronic    Diffuse large B cell lymphoma [C83.30] 04/26/2017 Yes    Coronary artery disease : multiple vessels, s/p PTCA [I25.10] 11/14/2016 Yes     Chronic    Pacemaker [Z95.0] 02/02/2016 Yes     Chronic    Mixed hyperlipidemia [E78.2] 02/01/2016 Yes     Chronic    Essential hypertension [I10] 02/01/2016 Yes     Chronic    Hypothyroidism (acquired) [E03.9] 02/01/2016 Yes     Chronic      Problems Resolved During this Admission:       Discharged Condition: good    Disposition: Home or Self Care    Follow Up:  Follow-up Information     Marti Coronel MD In 2 weeks.    Specialty: Family  "Medicine  Why: Hospital follow up stroke.  Contact information:  [address] [place] [address] [phone]                 Patient Instructions:      WALKER FOR HOME USE     Order Specific Question Answer Comments   Type of Walker: Adult (5'4"-6'6")    With wheels? Yes    Height: 5' 4" (1.626 m)    Weight: 65.8 kg (145 lb)    Length of need (1-99 months): 99    Does patient have medical equipment at home? cane, straight    Please check all that apply: Patient's condition impairs ambulation.    Vendor: [facility] NOT ELIGIBLE FOR  BC SHE GOT ONE FROM US IN 2017   Expected Date of Delivery: 11/17/2020      WHEELCHAIR FOR HOME USE     Order Specific Question Answer Comments   Hours in W/C per day: 8    Type of Wheelchair: Standard    Size(Width): 18"(STD adult)    Leg Support: STD footrests    Lap Belt: Velcro    Accessories: Front brakes    Cushion: Basic    Height: 5' 4" (1.626 m)    Weight: 65.8 kg (145 lb)    Does patient have medical equipment at home? cane, straight    Length of need (1-99 months): 99    Please check all that apply: Caregiver is capable and willing to operate wheelchair safely.      Ambulatory referral/consult to Physical/Occupational Therapy   Standing Status: Future   Referral Priority: Routine Referral Type: Physical Medicine   Referral Reason: Specialty Services Required   Number of Visits Requested: 1     Diet Cardiac       Significant Diagnostic Studies: Labs: All labs within the past 24 hours have been reviewed    Pending Diagnostic Studies:     None         Medications:  Reconciled Home Medications:      Medication List      CONTINUE taking these medications    aspirin 81 MG EC tablet  Commonly known as: ECOTRIN  Take 81 mg by mouth nightly.     busPIRone 7.5 MG tablet  Commonly known as: BUSPAR  TAKE ONE TABLET BY MOUTH THREE TIMES DAILY     calcitRIOL 0.25 MCG Cap  Commonly known as: ROCALTROL  TAKE ONE CAPSULE BY MOUTH EVERY MONDAY, WEDNESDAY AND FRIDAY   "   clopidogreL 75 mg tablet  Commonly known as: PLAVIX  Take 1 tablet (75 mg total) by mouth once daily.     diclofenac sodium 1 % Gel  Commonly known as: VOLTAREN  Apply 2 g topically once daily. Apply 2 g over painful joints once or twice a day.     DULoxetine 30 MG capsule  Commonly known as: CYMBALTA  Take 1 capsule (30 mg total) by mouth once daily.     * ergocalciferol (vitamin D2) 10 mcg (400 unit) Tab  Take by mouth.     * VITAMIN D2 50,000 unit Cap  Generic drug: ergocalciferol  Take 1 capsule (50,000 Units total) by mouth every 7 days.     FLUAD QUAD 2020-21(65Y UP)(PF) 60 mcg (15 mcg x 4)/0.5 mL Syrg  Generic drug: flu vac 2020 65up-vmpAG20Y(PF)  Inject 0.5 mLs into the muscle.     fluocinolone 0.01 % external solution  Commonly known as: SYNALAR  AAA of scalp twice a day prn itching.     fluticasone propionate 50 mcg/actuation nasal spray  Commonly known as: FLONASE  2 sprays (100 mcg total) by Each Nare route once daily.     gabapentin 300 MG capsule  Commonly known as: NEURONTIN  TAKE ONE CAPSULE BY MOUTH THREE TIMES DAILY     HYDROcodone-acetaminophen  mg per tablet  Commonly known as: NORCO  Take 1 tablet by mouth every 6 (six) hours as needed for Pain.     iron-vitamin C 100-250 mg (ICAR-C) 100-250 mg Tab  TAKE ONE TABLET BY MOUTH EVERY DAY     levocetirizine 5 MG tablet  Commonly known as: XYZAL  Take 1 tablet (5 mg total) by mouth every evening.     levothyroxine 75 MCG tablet  Commonly known as: SYNTHROID  TAKE ONE TABLET BY MOUTH EVERY DAY BEFORE BREAKFAST     meloxicam 7.5 MG tablet  Commonly known as: MOBIC  TAKE ONE TABLET BY MOUTH EVERY DAY AS NEEDED FOR PAIN     metoprolol tartrate 25 MG tablet  Commonly known as: LOPRESSOR  TAKE ONE TABLET BY MOUTH TWICE DAILY     mirtazapine 15 MG disintegrating tablet  Commonly known as: REMERON SOL-TAB  DISSOLVE ONE TABLET BY MOUTH NIGHTLY     ONE DAILY MULTIVITAMIN per tablet  Generic drug: multivitamin  Take 1 tablet by mouth once daily.      pravastatin 40 MG tablet  Commonly known as: PRAVACHOL  TAKE ONE TABLET BY MOUTH ONCE DAILY     ranitidine 150 MG capsule  Commonly known as: ZANTAC  Take 150 mg by mouth nightly.     sertraline 100 MG tablet  Commonly known as: ZOLOFT  TAKE 1 & 1/2 TABLETS BY MOUTH EVERY DAY     sodium bicarbonate 650 MG tablet  TAKE ONE TABLET BY MOUTH TWICE DAILY     sodium zirconium cyclosilicate 5 gram packet  Commonly known as: LOKELMA  Take 1 packet (5 g total) by mouth once daily. Mix entire contents of packet(s) into drinking glass containing >3 tablespoons of water; stir well and drink immediately. If powder remains in the drinking glass, add water and repeat until no powder remains to ensure entire dose is taken.     traMADoL 50 mg tablet  Commonly known as: ULTRAM  Take 1 tablet (50 mg total) by mouth every 12 (twelve) hours as needed for Pain.     tretinoin 0.025 % cream  Commonly known as: RETIN-A  Apply a pea-sized amount to the entire face at bedtime.  Use every third night and increase as tolerated to nightly.     triamcinolone acetonide 0.025% 0.025 % cream  Commonly known as: KENALOG  Apply topically 2 (two) times daily. PRN itching.     VITAMIN C 100 MG tablet  Generic drug: ascorbic acid (vitamin C)  Take by mouth once daily.     ZINC ACETATE ORAL  Take 250 mg by mouth once daily.         * This list has 2 medication(s) that are the same as other medications prescribed for you. Read the directions carefully, and ask your doctor or other care provider to review them with you.                Indwelling Lines/Drains at time of discharge:   Lines/Drains/Airways     Central Venous Catheter Line                 Port A Cath Single Lumen right subclavian -- days                Time spent on the discharge of patient: 30 minutes  Patient was seen and examined on the date of discharge and determined to be suitable for discharge.         Jesús Carranza MD  Department of Hospital Medicine  [facility]

## 2020-11-23 ENCOUNTER — PES CALL (OUTPATIENT)
Dept: ADMINISTRATIVE | Facility: CLINIC | Age: 80
End: 2020-11-23

## 2020-11-27 ENCOUNTER — DOCUMENTATION ONLY (OUTPATIENT)
Dept: CARDIOLOGY | Facility: HOSPITAL | Age: 80
End: 2020-11-27

## 2020-11-27 ENCOUNTER — HOSPITAL ENCOUNTER (OUTPATIENT)
Facility: HOSPITAL | Age: 80
Discharge: HOME | End: 2020-11-30
Attending: EMERGENCY MEDICINE | Admitting: INTERNAL MEDICINE
Payer: MEDICARE

## 2020-11-27 ENCOUNTER — OFFICE VISIT (OUTPATIENT)
Dept: CARDIOLOGY | Facility: CLINIC | Age: 80
End: 2020-11-27
Payer: MEDICARE

## 2020-11-27 ENCOUNTER — HOSPITAL ENCOUNTER (OUTPATIENT)
Dept: CARDIOLOGY | Facility: HOSPITAL | Age: 80
Discharge: HOME | End: 2020-11-27
Attending: INTERNAL MEDICINE
Payer: MEDICARE

## 2020-11-27 VITALS
WEIGHT: 136 LBS | OXYGEN SATURATION: 97 % | HEART RATE: 87 BPM | DIASTOLIC BLOOD PRESSURE: 100 MMHG | BODY MASS INDEX: 23.22 KG/M2 | SYSTOLIC BLOOD PRESSURE: 170 MMHG | HEIGHT: 64 IN

## 2020-11-27 DIAGNOSIS — Z95.0 PACEMAKER: Chronic | ICD-10-CM

## 2020-11-27 DIAGNOSIS — I48.0 PAROXYSMAL ATRIAL FIBRILLATION: Chronic | ICD-10-CM

## 2020-11-27 DIAGNOSIS — I10 ESSENTIAL HYPERTENSION: Chronic | ICD-10-CM

## 2020-11-27 DIAGNOSIS — C85.83 LARGE CELL LYMPHOMA OF INTRA-ABDOMINAL LYMPH NODES: Chronic | ICD-10-CM

## 2020-11-27 DIAGNOSIS — Z95.0 CARDIAC PACEMAKER IN SITU: ICD-10-CM

## 2020-11-27 DIAGNOSIS — R07.9 CHEST PAIN SYNDROME: Primary | ICD-10-CM

## 2020-11-27 DIAGNOSIS — Z86.79 HISTORY OF CONGESTIVE HEART FAILURE: ICD-10-CM

## 2020-11-27 DIAGNOSIS — I50.32 CHRONIC DIASTOLIC HEART FAILURE: ICD-10-CM

## 2020-11-27 DIAGNOSIS — M54.41 CHRONIC MIDLINE LOW BACK PAIN WITH RIGHT-SIDED SCIATICA: ICD-10-CM

## 2020-11-27 DIAGNOSIS — N18.30 STAGE 3 CHRONIC KIDNEY DISEASE, UNSPECIFIED WHETHER STAGE 3A OR 3B CKD: Chronic | ICD-10-CM

## 2020-11-27 DIAGNOSIS — I20.0 UNSTABLE ANGINA: Primary | ICD-10-CM

## 2020-11-27 DIAGNOSIS — E78.2 MIXED HYPERLIPIDEMIA: Chronic | ICD-10-CM

## 2020-11-27 DIAGNOSIS — D64.9 CHRONIC ANEMIA: Chronic | ICD-10-CM

## 2020-11-27 DIAGNOSIS — R07.9 CHEST PAIN: ICD-10-CM

## 2020-11-27 DIAGNOSIS — R07.9 CHEST PAIN, UNSPECIFIED TYPE: ICD-10-CM

## 2020-11-27 DIAGNOSIS — R07.9 CHEST PAIN, UNSPECIFIED TYPE: Primary | ICD-10-CM

## 2020-11-27 DIAGNOSIS — G89.29 CHRONIC MIDLINE LOW BACK PAIN WITH RIGHT-SIDED SCIATICA: ICD-10-CM

## 2020-11-27 DIAGNOSIS — I20.89 ANGINA AT REST: ICD-10-CM

## 2020-11-27 LAB
ALBUMIN SERPL BCP-MCNC: 3.3 G/DL (ref 3.5–5.2)
ALP SERPL-CCNC: 78 U/L (ref 55–135)
ALT SERPL W/O P-5'-P-CCNC: 30 U/L (ref 10–44)
ANION GAP SERPL CALC-SCNC: 8 MMOL/L (ref 8–16)
APTT BLDCRRT: 27.1 SEC (ref 21–32)
AST SERPL-CCNC: 32 U/L (ref 10–40)
BASOPHILS # BLD AUTO: 0.05 K/UL (ref 0–0.2)
BASOPHILS NFR BLD: 0.5 % (ref 0–1.9)
BILIRUB SERPL-MCNC: 0.3 MG/DL (ref 0.1–1)
BNP SERPL-MCNC: 1481 PG/ML (ref 0–99)
BUN SERPL-MCNC: 20 MG/DL (ref 8–23)
CALCIUM SERPL-MCNC: 9.1 MG/DL (ref 8.7–10.5)
CHLORIDE SERPL-SCNC: 111 MMOL/L (ref 95–110)
CO2 SERPL-SCNC: 21 MMOL/L (ref 23–29)
CREAT SERPL-MCNC: 1.3 MG/DL (ref 0.5–1.4)
D DIMER PPP IA.FEU-MCNC: 2.78 MG/L FEU
DIFFERENTIAL METHOD: ABNORMAL
EOSINOPHIL # BLD AUTO: 0.1 K/UL (ref 0–0.5)
EOSINOPHIL NFR BLD: 0.7 % (ref 0–8)
ERYTHROCYTE [DISTWIDTH] IN BLOOD BY AUTOMATED COUNT: 16.8 % (ref 11.5–14.5)
EST. GFR  (AFRICAN AMERICAN): 45 ML/MIN/1.73 M^2
EST. GFR  (NON AFRICAN AMERICAN): 39 ML/MIN/1.73 M^2
GLUCOSE SERPL-MCNC: 100 MG/DL (ref 70–110)
HCT VFR BLD AUTO: 35.2 % (ref 37–48.5)
HGB BLD-MCNC: 11 G/DL (ref 12–16)
IMM GRANULOCYTES # BLD AUTO: 0.08 K/UL (ref 0–0.04)
IMM GRANULOCYTES NFR BLD AUTO: 0.8 % (ref 0–0.5)
INR PPP: 1 (ref 0.8–1.2)
LYMPHOCYTES # BLD AUTO: 3 K/UL (ref 1–4.8)
LYMPHOCYTES NFR BLD: 30.5 % (ref 18–48)
MCH RBC QN AUTO: 31.9 PG (ref 27–31)
MCHC RBC AUTO-ENTMCNC: 31.3 G/DL (ref 32–36)
MCV RBC AUTO: 102 FL (ref 82–98)
MONOCYTES # BLD AUTO: 0.5 K/UL (ref 0.3–1)
MONOCYTES NFR BLD: 5.2 % (ref 4–15)
NEUTROPHILS # BLD AUTO: 6.1 K/UL (ref 1.8–7.7)
NEUTROPHILS NFR BLD: 62.3 % (ref 38–73)
NRBC BLD-RTO: 0 /100 WBC
PLATELET # BLD AUTO: 265 K/UL (ref 150–350)
PMV BLD AUTO: 10.4 FL (ref 9.2–12.9)
POTASSIUM SERPL-SCNC: 5 MMOL/L (ref 3.5–5.1)
PROT SERPL-MCNC: 7.1 G/DL (ref 6–8.4)
PROTHROMBIN TIME: 10.2 SEC (ref 9–12.5)
RBC # BLD AUTO: 3.45 M/UL (ref 4–5.4)
SARS-COV-2 RDRP RESP QL NAA+PROBE: NEGATIVE
SODIUM SERPL-SCNC: 140 MMOL/L (ref 136–145)
TROPONIN I SERPL DL<=0.01 NG/ML-MCNC: 0.01 NG/ML (ref 0–0.03)
TROPONIN I SERPL DL<=0.01 NG/ML-MCNC: 0.02 NG/ML (ref 0–0.03)
WBC # BLD AUTO: 9.83 K/UL (ref 3.9–12.7)

## 2020-11-27 PROCEDURE — 83880 ASSAY OF NATRIURETIC PEPTIDE: CPT | Mod: HCNC

## 2020-11-27 PROCEDURE — 99291 CRITICAL CARE FIRST HOUR: CPT | Mod: 25,HCNC

## 2020-11-27 PROCEDURE — U0002 COVID-19 LAB TEST NON-CDC: HCPCS | Mod: HCNC

## 2020-11-27 PROCEDURE — 85730 THROMBOPLASTIN TIME PARTIAL: CPT | Mod: HCNC

## 2020-11-27 PROCEDURE — 85379 FIBRIN DEGRADATION QUANT: CPT | Mod: HCNC

## 2020-11-27 PROCEDURE — G0378 HOSPITAL OBSERVATION PER HR: HCPCS | Mod: HCNC

## 2020-11-27 PROCEDURE — 1157F PR ADVANCE CARE PLAN OR EQUIV PRESENT IN MEDICAL RECORD: ICD-10-PCS | Mod: HCNC,S$GLB,, | Performed by: INTERNAL MEDICINE

## 2020-11-27 PROCEDURE — 99999 PR PBB SHADOW E&M-EST. PATIENT-LVL V: ICD-10-PCS | Mod: PBBFAC,HCNC,, | Performed by: INTERNAL MEDICINE

## 2020-11-27 PROCEDURE — 85610 PROTHROMBIN TIME: CPT | Mod: HCNC

## 2020-11-27 PROCEDURE — 85025 COMPLETE CBC W/AUTO DIFF WBC: CPT | Mod: HCNC

## 2020-11-27 PROCEDURE — 25000003 PHARM REV CODE 250: Mod: HCNC | Performed by: EMERGENCY MEDICINE

## 2020-11-27 PROCEDURE — 1159F MED LIST DOCD IN RCRD: CPT | Mod: HCNC,S$GLB,, | Performed by: INTERNAL MEDICINE

## 2020-11-27 PROCEDURE — 93010 EKG 12-LEAD: ICD-10-PCS | Mod: HCNC,,, | Performed by: INTERNAL MEDICINE

## 2020-11-27 PROCEDURE — 3080F PR MOST RECENT DIASTOLIC BLOOD PRESSURE >= 90 MM HG: ICD-10-PCS | Mod: HCNC,CPTII,S$GLB, | Performed by: INTERNAL MEDICINE

## 2020-11-27 PROCEDURE — 1157F ADVNC CARE PLAN IN RCRD: CPT | Mod: HCNC,S$GLB,, | Performed by: INTERNAL MEDICINE

## 2020-11-27 PROCEDURE — 99999 PR PBB SHADOW E&M-EST. PATIENT-LVL V: CPT | Mod: PBBFAC,HCNC,, | Performed by: INTERNAL MEDICINE

## 2020-11-27 PROCEDURE — 1125F AMNT PAIN NOTED PAIN PRSNT: CPT | Mod: HCNC,S$GLB,, | Performed by: INTERNAL MEDICINE

## 2020-11-27 PROCEDURE — 93010 ELECTROCARDIOGRAM REPORT: CPT | Mod: HCNC,,, | Performed by: INTERNAL MEDICINE

## 2020-11-27 PROCEDURE — 63600175 PHARM REV CODE 636 W HCPCS: Mod: HCNC | Performed by: FAMILY MEDICINE

## 2020-11-27 PROCEDURE — 3077F PR MOST RECENT SYSTOLIC BLOOD PRESSURE >= 140 MM HG: ICD-10-PCS | Mod: HCNC,CPTII,S$GLB, | Performed by: INTERNAL MEDICINE

## 2020-11-27 PROCEDURE — 96374 THER/PROPH/DIAG INJ IV PUSH: CPT | Mod: HCNC

## 2020-11-27 PROCEDURE — 93005 ELECTROCARDIOGRAM TRACING: CPT | Mod: HCNC

## 2020-11-27 PROCEDURE — 36415 COLL VENOUS BLD VENIPUNCTURE: CPT | Mod: HCNC

## 2020-11-27 PROCEDURE — 99214 OFFICE O/P EST MOD 30 MIN: CPT | Mod: HCNC,S$GLB,, | Performed by: INTERNAL MEDICINE

## 2020-11-27 PROCEDURE — 3080F DIAST BP >= 90 MM HG: CPT | Mod: HCNC,CPTII,S$GLB, | Performed by: INTERNAL MEDICINE

## 2020-11-27 PROCEDURE — 1159F PR MEDICATION LIST DOCUMENTED IN MEDICAL RECORD: ICD-10-PCS | Mod: HCNC,S$GLB,, | Performed by: INTERNAL MEDICINE

## 2020-11-27 PROCEDURE — 84484 ASSAY OF TROPONIN QUANT: CPT | Mod: 91,HCNC

## 2020-11-27 PROCEDURE — 1125F PR PAIN SEVERITY QUANTIFIED, PAIN PRESENT: ICD-10-PCS | Mod: HCNC,S$GLB,, | Performed by: INTERNAL MEDICINE

## 2020-11-27 PROCEDURE — 3077F SYST BP >= 140 MM HG: CPT | Mod: HCNC,CPTII,S$GLB, | Performed by: INTERNAL MEDICINE

## 2020-11-27 PROCEDURE — 99214 PR OFFICE/OUTPT VISIT, EST, LEVL IV, 30-39 MIN: ICD-10-PCS | Mod: HCNC,S$GLB,, | Performed by: INTERNAL MEDICINE

## 2020-11-27 PROCEDURE — 25000003 PHARM REV CODE 250: Mod: HCNC | Performed by: INTERNAL MEDICINE

## 2020-11-27 PROCEDURE — 80053 COMPREHEN METABOLIC PANEL: CPT | Mod: HCNC

## 2020-11-27 RX ORDER — PRAVASTATIN SODIUM 20 MG/1
40 TABLET ORAL DAILY
Status: DISCONTINUED | OUTPATIENT
Start: 2020-11-28 | End: 2020-11-30 | Stop reason: HOSPADM

## 2020-11-27 RX ORDER — IBUPROFEN 200 MG
16 TABLET ORAL
Status: DISCONTINUED | OUTPATIENT
Start: 2020-11-27 | End: 2020-11-30 | Stop reason: HOSPADM

## 2020-11-27 RX ORDER — HYDROCODONE BITARTRATE AND ACETAMINOPHEN 10; 325 MG/1; MG/1
1 TABLET ORAL EVERY 6 HOURS PRN
Status: DISCONTINUED | OUTPATIENT
Start: 2020-11-27 | End: 2020-11-30 | Stop reason: HOSPADM

## 2020-11-27 RX ORDER — LEVOTHYROXINE SODIUM 75 UG/1
75 TABLET ORAL DAILY
Status: DISCONTINUED | OUTPATIENT
Start: 2020-11-28 | End: 2020-11-30 | Stop reason: HOSPADM

## 2020-11-27 RX ORDER — MIRTAZAPINE 15 MG/1
15 TABLET, FILM COATED ORAL NIGHTLY
Status: DISCONTINUED | OUTPATIENT
Start: 2020-11-27 | End: 2020-11-30 | Stop reason: HOSPADM

## 2020-11-27 RX ORDER — DULOXETIN HYDROCHLORIDE 30 MG/1
30 CAPSULE, DELAYED RELEASE ORAL DAILY
Status: DISCONTINUED | OUTPATIENT
Start: 2020-11-28 | End: 2020-11-30 | Stop reason: HOSPADM

## 2020-11-27 RX ORDER — ASPIRIN 325 MG
325 TABLET ORAL
Status: DISCONTINUED | OUTPATIENT
Start: 2020-11-27 | End: 2020-11-27

## 2020-11-27 RX ORDER — CLOPIDOGREL BISULFATE 75 MG/1
75 TABLET ORAL DAILY
Status: DISCONTINUED | OUTPATIENT
Start: 2020-11-28 | End: 2020-11-30 | Stop reason: HOSPADM

## 2020-11-27 RX ORDER — SODIUM BICARBONATE 650 MG/1
650 TABLET ORAL 2 TIMES DAILY
Status: DISCONTINUED | OUTPATIENT
Start: 2020-11-27 | End: 2020-11-30 | Stop reason: HOSPADM

## 2020-11-27 RX ORDER — IBUPROFEN 200 MG
24 TABLET ORAL
Status: DISCONTINUED | OUTPATIENT
Start: 2020-11-27 | End: 2020-11-30 | Stop reason: HOSPADM

## 2020-11-27 RX ORDER — SODIUM CHLORIDE 0.9 % (FLUSH) 0.9 %
10 SYRINGE (ML) INJECTION
Status: DISCONTINUED | OUTPATIENT
Start: 2020-11-27 | End: 2020-11-30 | Stop reason: HOSPADM

## 2020-11-27 RX ORDER — TALC
6 POWDER (GRAM) TOPICAL NIGHTLY PRN
Status: DISCONTINUED | OUTPATIENT
Start: 2020-11-27 | End: 2020-11-30 | Stop reason: HOSPADM

## 2020-11-27 RX ORDER — FUROSEMIDE 10 MG/ML
40 INJECTION INTRAMUSCULAR; INTRAVENOUS
Status: COMPLETED | OUTPATIENT
Start: 2020-11-27 | End: 2020-11-27

## 2020-11-27 RX ORDER — SERTRALINE HYDROCHLORIDE 50 MG/1
150 TABLET, FILM COATED ORAL DAILY
Status: DISCONTINUED | OUTPATIENT
Start: 2020-11-28 | End: 2020-11-30 | Stop reason: HOSPADM

## 2020-11-27 RX ORDER — GLUCAGON 1 MG
1 KIT INJECTION
Status: DISCONTINUED | OUTPATIENT
Start: 2020-11-27 | End: 2020-11-30 | Stop reason: HOSPADM

## 2020-11-27 RX ORDER — ASPIRIN 81 MG/1
81 TABLET ORAL NIGHTLY
Status: DISCONTINUED | OUTPATIENT
Start: 2020-11-27 | End: 2020-11-30 | Stop reason: HOSPADM

## 2020-11-27 RX ORDER — GABAPENTIN 300 MG/1
300 CAPSULE ORAL 3 TIMES DAILY
Status: DISCONTINUED | OUTPATIENT
Start: 2020-11-28 | End: 2020-11-30 | Stop reason: HOSPADM

## 2020-11-27 RX ORDER — METOPROLOL TARTRATE 25 MG/1
25 TABLET, FILM COATED ORAL 2 TIMES DAILY
Status: DISCONTINUED | OUTPATIENT
Start: 2020-11-27 | End: 2020-11-30 | Stop reason: HOSPADM

## 2020-11-27 RX ORDER — TRAMADOL HYDROCHLORIDE 50 MG/1
50 TABLET ORAL EVERY 12 HOURS PRN
Status: DISCONTINUED | OUTPATIENT
Start: 2020-11-27 | End: 2020-11-30 | Stop reason: HOSPADM

## 2020-11-27 RX ORDER — SUCRALFATE 1 G/10ML
1 SUSPENSION ORAL
Status: COMPLETED | OUTPATIENT
Start: 2020-11-27 | End: 2020-11-27

## 2020-11-27 RX ADMIN — ASPIRIN 81 MG: 81 TABLET, COATED ORAL at 11:11

## 2020-11-27 RX ADMIN — SUCRALFATE 1 G: 1 SUSPENSION ORAL at 04:11

## 2020-11-27 RX ADMIN — SODIUM BICARBONATE 650 MG: 650 TABLET ORAL at 11:11

## 2020-11-27 RX ADMIN — FUROSEMIDE 40 MG: 10 INJECTION, SOLUTION INTRAMUSCULAR; INTRAVENOUS at 06:11

## 2020-11-27 RX ADMIN — MIRTAZAPINE 15 MG: 15 TABLET, FILM COATED ORAL at 11:11

## 2020-11-27 RX ADMIN — METOPROLOL TARTRATE 25 MG: 25 TABLET, FILM COATED ORAL at 11:11

## 2020-11-27 NOTE — ED PROVIDER NOTES
SCRIBE #1 NOTE: I, Corinne Mack, am scribing for, and in the presence of, Michelle De La Torre MD. I have scribed the HPI, ROS, and PEx.     SCRIBE #2 NOTE: I, Ayanna Chavez, am scribing for, and in the presence of,  Haley Vargas MD. I have scribed the remaining portions of the note not scribed by Scribe #1.     History      Chief Complaint   Patient presents with    Chest Pain     sent by [facility] today with resolved CP that started yesterday       Review of patient's allergies indicates:   Allergen Reactions    Corticosteroids (glucocorticoids) Nausea Only and Other (See Comments)     Stomach pain, dizziness, headache    Oxycodone Other (See Comments)     Blood pressure dropped        HPI   HPI    11/27/2020, 3:00 PM   History obtained from the patient      History of Present Illness: Violet Swenson is a 80 y.o. female patient with PMHx of anemia, anxiety, Afib, CHF, GERD, HTN, CAD, CKD, and stroke who presents to the Emergency Department for CP which onset gradually yesterday. Pt was seen by Dr. Gli (Cardiology) today who referred the pt to the ED for further evaluation. Symptoms are intermittent and moderate in severity. No mitigating or exacerbating factors reported. Associated sxs include N/V and SOB. Patient denies any fever, chills, CP, diarrhea, abd pain, back pain, neck pain, HA, dizziness, and all other sxs at this time. Prior Tx includes ASA and NTG paste with some relief. No further complaints or concerns at this time.         Arrival mode: AASI    PCP: Marti Coronel MD       Past Medical History:  Past Medical History:   Diagnosis Date    Age-related osteoporosis without current pathological fracture 8/20/2018    Anemia     Anxiety     Arthritis     Atrial flutter     Cancer     lymphoma Large cell B    CHF (congestive heart failure)     Chronic anemia 4/26/2017    Chronic midline low back pain with right-sided sciatica 8/20/2018    Coronary artery disease     01/2015 [facility]  patent LCX. 50% stenosis in LAD and RCA.      Depression     Disorder of kidney and ureter     Encounter for blood transfusion     GERD (gastroesophageal reflux disease)     Gout, arthritis     Heart failure     Hx of psychiatric care     Hyperlipidemia     Hypertension     Hypothyroidism     Immune deficiency disorder     Kidney disease     Lung nodule 2014    RML--stable    Obesity     Pacemaker     Metronic    Paroxysmal atrial fibrillation 3/15/2018    Pneumonia     Polyneuropathy     chemo induced    Psychiatric problem     Stroke 11/16/2020    Tobacco dependence     quit 1976    Trouble in sleeping     Unstable angina 11/27/2020       Past Surgical History:  Past Surgical History:   Procedure Laterality Date    APPENDECTOMY  1966 approx    CARDIAC PACEMAKER PLACEMENT  01/22/2015    CHOLECYSTECTOMY  1993    incidental at time of gastric bypass    COLON SURGERY Right 2017    hemicolectomy    COLONOSCOPY N/A 4/6/2017    Procedure: COLONOSCOPY;  Surgeon: Tye Enamorado MD;  Location: [facility];  Service: Endoscopy;  Laterality: N/A;    COLONOSCOPY N/A 11/28/2018    Procedure: COLONOSCOPY;  Surgeon: Saúl Arthur III, MD;  Location: [facility];  Service: Endoscopy;  Laterality: N/A;    CORONARY ANGIOPLASTY  02/2014    CORONARY STENT PLACEMENT  02/05/2014    ESOPHAGOGASTRODUODENOSCOPY N/A 11/28/2018    Procedure: EGD (ESOPHAGOGASTRODUODENOSCOPY);  Surgeon: Saúl Arthur III, MD;  Location: [facility];  Service: Endoscopy;  Laterality: N/A;    GASTRIC BYPASS  1993    with incidental choly    HERNIA REPAIR      INJECTION OF ANESTHETIC AGENT INTO SACROILIAC JOINT Right 10/8/2020    Procedure: Right BLOCK, SACROILIAC JOINT with RN IV sedation;  Surgeon: Madi Anton MD;  Location: [facility];  Service: Pain Management;  Laterality: Right;    JOINT REPLACEMENT Bilateral 2009    3 months apart    TONSILLECTOMY  1959         Family History:  Family History   Problem Relation Age  "of Onset    Heart disease Mother     Hypertension Mother     Cataracts Mother     Stomach cancer Father         "ulcers that turned to cancer"    Cancer Father         stomach    Pancreatic cancer Sister     Cancer Sister         pancreatic    Leukemia Brother         "leukemia which led to intestinal cancer"    Cataracts Brother     Cancer Brother         leukemia then later stomach cancer    Drug abuse Son     Cancer Son         prostate cancer    Prostate cancer Son     COPD Daughter     Asthma Daughter     Hypertension Maternal Grandfather     Stroke Maternal Grandfather     Alcohol abuse Neg Hx     Diabetes Neg Hx     Mental retardation Neg Hx     Mental illness Neg Hx        Social History:  Social History     Tobacco Use    Smoking status: Former Smoker     Packs/day: 2.00     Years: 6.00     Pack years: 12.00     Quit date: 3/15/1976     Years since quittin.7    Smokeless tobacco: Never Used   Substance and Sexual Activity    Alcohol use: No     Alcohol/week: 0.0 standard drinks    Drug use: No    Sexual activity: Never       ROS   Review of Systems   Constitutional: Negative for chills and fever.   Respiratory: Positive for shortness of breath. Negative for cough.    Cardiovascular: Positive for chest pain. Negative for leg swelling.   Gastrointestinal: Positive for nausea and vomiting. Negative for abdominal pain and diarrhea.   Musculoskeletal: Negative for back pain, neck pain and neck stiffness.   Skin: Negative for rash and wound.   Neurological: Negative for dizziness, light-headedness, numbness and headaches.   All other systems reviewed and are negative.    Physical Exam      Initial Vitals [20 1450]   BP Pulse Resp Temp SpO2   (!) 164/90 94 18 98.6 °F (37 °C) 96 %      MAP       --          Physical Exam  Nursing Notes and Vital Signs Reviewed.  Constitutional: Patient is in no acute distress. Well-developed and well-nourished.  Head: Atraumatic. " Normocephalic.  Eyes: PERRL. EOM intact. Conjunctivae are not pale. No scleral icterus.  ENT: Mucous membranes are moist. Oropharynx is clear and symmetric.    Neck: Supple. Full ROM. No lymphadenopathy.  Cardiovascular: Regular rate. Regular rhythm. No murmurs, rubs, or gallops. Distal pulses are 2+ and symmetric.  Pulmonary/Chest: No respiratory distress. Clear to auscultation bilaterally. No wheezing or rales.  Abdominal: Soft and non-distended.  There is no tenderness.  No rebound, guarding, or rigidity.   Musculoskeletal: Moves all extremities. No obvious deformities. No edema.   Skin: Warm and dry.  Neurological:  Alert, awake, and appropriate.  Normal speech.  No acute focal neurological deficits are appreciated.  Psychiatric: Normal affect. Good eye contact. Appropriate in content.    ED Course    Critical Care    Date/Time: 11/27/2020 10:09 PM  Performed by: Haley Vargas MD  Authorized by: Haley Vargas MD   Direct patient critical care time: 15 minutes  Additional history critical care time: 7 minutes  Ordering / reviewing critical care time: 11 minutes  Documentation critical care time: 6 minutes  Consulting other physicians critical care time: 6 minutes  Consult with family critical care time: 0 minutes  Other critical care time: 0 minutes  Total critical care time (exclusive of procedural time) : 45 minutes  Critical care time was exclusive of separately billable procedures and treating other patients and teaching time.  Critical care was necessary to treat or prevent imminent or life-threatening deterioration of the following conditions: unstable angina, CHF.  Critical care was time spent personally by me on the following activities: blood draw for specimens, development of treatment plan with patient or surrogate, discussions with consultants, interpretation of cardiac output measurements, evaluation of patient's response to treatment, examination of patient, obtaining history from patient or  surrogate, ordering and performing treatments and interventions, ordering and review of laboratory studies, ordering and review of radiographic studies, pulse oximetry, re-evaluation of patient's condition and review of old charts.        ED Vital Signs:  Vitals:    11/27/20 1450 11/27/20 1514 11/27/20 1515 11/27/20 1615   BP: (!) 164/90 (!) 185/95 (!) 183/100 (!) 158/83   Pulse: 94 82 83 75   Resp: 18 18 18 12   Temp: 98.6 °F (37 °C)      TempSrc: Oral      SpO2: 96% 98% 98% 98%    11/27/20 1630 11/27/20 1700 11/27/20 1815 11/27/20 1915   BP: (!) 149/73 (!) 150/82 (!) 168/88 (!) 159/100   Pulse: 76 73 84 79   Resp: 16 11 12 19   Temp:       TempSrc:       SpO2: 98% 98% 98% 98%    11/27/20 2000 11/27/20 2015 11/27/20 2030 11/27/20 2115   BP: (!) 153/80  123/64 (!) 142/80   Pulse: 83  91 83   Resp: 20 12 15 15   Temp:       TempSrc:       SpO2: 98%  96% 96%       Abnormal Lab Results:  Labs Reviewed   CBC W/ AUTO DIFFERENTIAL - Abnormal; Notable for the following components:       Result Value    RBC 3.45 (*)     Hemoglobin 11.0 (*)     Hematocrit 35.2 (*)      (*)     MCH 31.9 (*)     MCHC 31.3 (*)     RDW 16.8 (*)     Immature Granulocytes 0.8 (*)     Immature Grans (Abs) 0.08 (*)     All other components within normal limits   COMPREHENSIVE METABOLIC PANEL - Abnormal; Notable for the following components:    Chloride 111 (*)     CO2 21 (*)     Albumin 3.3 (*)     eGFR if  45 (*)     eGFR if non  39 (*)     All other components within normal limits   B-TYPE NATRIURETIC PEPTIDE - Abnormal; Notable for the following components:    BNP 1,481 (*)     All other components within normal limits   TROPONIN I   PROTIME-INR   APTT   SARS-COV-2 RNA AMPLIFICATION, QUAL    Narrative:     Possible admit planning   TROPONIN I        All Lab Results:  Results for orders placed or performed during the hospital encounter of 11/27/20   CBC auto differential   Result Value Ref Range    WBC 9.83  3.90 - 12.70 K/uL    RBC 3.45 (L) 4.00 - 5.40 M/uL    Hemoglobin 11.0 (L) 12.0 - 16.0 g/dL    Hematocrit 35.2 (L) 37.0 - 48.5 %     (H) 82 - 98 fL    MCH 31.9 (H) 27.0 - 31.0 pg    MCHC 31.3 (L) 32.0 - 36.0 g/dL    RDW 16.8 (H) 11.5 - 14.5 %    Platelets 265 150 - 350 K/uL    MPV 10.4 9.2 - 12.9 fL    Immature Granulocytes 0.8 (H) 0.0 - 0.5 %    Gran # (ANC) 6.1 1.8 - 7.7 K/uL    Immature Grans (Abs) 0.08 (H) 0.00 - 0.04 K/uL    Lymph # 3.0 1.0 - 4.8 K/uL    Mono # 0.5 0.3 - 1.0 K/uL    Eos # 0.1 0.0 - 0.5 K/uL    Baso # 0.05 0.00 - 0.20 K/uL    nRBC 0 0 /100 WBC    Gran % 62.3 38.0 - 73.0 %    Lymph % 30.5 18.0 - 48.0 %    Mono % 5.2 4.0 - 15.0 %    Eosinophil % 0.7 0.0 - 8.0 %    Basophil % 0.5 0.0 - 1.9 %    Differential Method Automated    Comprehensive metabolic panel   Result Value Ref Range    Sodium 140 136 - 145 mmol/L    Potassium 5.0 3.5 - 5.1 mmol/L    Chloride 111 (H) 95 - 110 mmol/L    CO2 21 (L) 23 - 29 mmol/L    Glucose 100 70 - 110 mg/dL    BUN 20 8 - 23 mg/dL    Creatinine 1.3 0.5 - 1.4 mg/dL    Calcium 9.1 8.7 - 10.5 mg/dL    Total Protein 7.1 6.0 - 8.4 g/dL    Albumin 3.3 (L) 3.5 - 5.2 g/dL    Total Bilirubin 0.3 0.1 - 1.0 mg/dL    Alkaline Phosphatase 78 55 - 135 U/L    AST 32 10 - 40 U/L    ALT 30 10 - 44 U/L    Anion Gap 8 8 - 16 mmol/L    eGFR if African American 45 (A) >60 mL/min/1.73 m^2    eGFR if non African American 39 (A) >60 mL/min/1.73 m^2   Troponin I #1   Result Value Ref Range    Troponin I 0.011 0.000 - 0.026 ng/mL   BNP   Result Value Ref Range    BNP 1,481 (H) 0 - 99 pg/mL   Protime-INR   Result Value Ref Range    Prothrombin Time 10.2 9.0 - 12.5 sec    INR 1.0 0.8 - 1.2   APTT   Result Value Ref Range    aPTT 27.1 21.0 - 32.0 sec   COVID-19 Rapid Screening   Result Value Ref Range    SARS-CoV-2 RNA, Amplification, Qual Negative Negative   Troponin I #2   Result Value Ref Range    Troponin I 0.022 0.000 - 0.026 ng/mL       Imaging Results:  Imaging Results          X-Ray  Chest AP Portable (Final result)  Result time 11/27/20 15:49:26    Final result by Ti Ordonez MD (11/27/20 15:49:26)                 Impression:      No acute findings.      Electronically signed by: Ti Ordonez MD  Date:    11/27/2020  Time:    15:49             Narrative:    EXAMINATION:  XR CHEST AP PORTABLE    CLINICAL HISTORY:  Chest Pain;    TECHNIQUE:  Single frontal view of the chest was performed.    COMPARISON:  11/16/2020    FINDINGS:  Left chest pacer.  Aortic atherosclerosis.  Heart size accentuated by technique appearing mildly enlarged.    The lungs are clear.  No pleural effusions.    No acute osseous findings.                                 The EKG was ordered, reviewed, and independently interpreted by the ED provider.  Interpretation time: 1509  Rate: 81 BPM  Rhythm: normal sinus rhythm  Interpretation: R axis deviation. Nonspecific intraventricular block. No STEMI.             The Emergency Provider reviewed the vital signs and test results, which are outlined above.    ED Discussion     6:09 PM: Re-evaluated pt. Pt is resting comfortably and is in no acute distress.  Pt denies any CP at this time. Will get repeat troponin. Gave a dose of Lasix due to BNP over 1,000. D/w pt all pertinent results. D/w pt any concerns expressed at this time. Answered all questions. Pt expresses understanding at this time.    8:22 PM: Discussed pt's case with Dr. Fry (cardiology) who recommends nitro patch, cycle troponin, and echo in the AM. He will see pt tomorrow.    9:49 PM: Discussed case with Erika Dumont NP (Hospital Medicine). Dr. Johnson agrees with current care and management of pt and accepts admission.   Admitting Service: hospital medicine  Admitting Physician: Dr. Johnson  Admit to: obs tele    9:49 PM: Re-evaluated pt. I have discussed test results, shared treatment plan, and the need for admission with patient and family at bedside. Pt and family express understanding at this time and  agree with all information. All questions answered. Pt and family have no further questions or concerns at this time. Pt is ready for admit.        ED Medication(s):  Medications   aspirin EC tablet 81 mg (has no administration in time range)   busPIRone split tablet 7.5 mg (has no administration in time range)   clopidogreL tablet 75 mg (has no administration in time range)   DULoxetine DR capsule 30 mg (has no administration in time range)   gabapentin capsule 300 mg (has no administration in time range)   HYDROcodone-acetaminophen  mg per tablet 1 tablet (has no administration in time range)   levothyroxine tablet 75 mcg (has no administration in time range)   metoprolol tartrate (LOPRESSOR) tablet 25 mg (has no administration in time range)   mirtazapine tablet 15 mg (has no administration in time range)   pravastatin tablet 40 mg (has no administration in time range)   sertraline tablet 150 mg (has no administration in time range)   sodium bicarbonate tablet 650 mg (has no administration in time range)   traMADoL tablet 50 mg (has no administration in time range)   sucralfate 100 mg/mL suspension 1 g (1 g Oral Given 11/27/20 1630)   furosemide injection 40 mg (40 mg Intravenous Given 11/27/20 1807)          Medication List      ASK your doctor about these medications    aspirin 81 MG EC tablet  Commonly known as: ECOTRIN     busPIRone 7.5 MG tablet  Commonly known as: BUSPAR  TAKE ONE TABLET BY MOUTH THREE TIMES DAILY     calcitRIOL 0.25 MCG Cap  Commonly known as: ROCALTROL  TAKE ONE CAPSULE BY MOUTH EVERY MONDAY, WEDNESDAY AND FRIDAY     clopidogreL 75 mg tablet  Commonly known as: PLAVIX  Take 1 tablet (75 mg total) by mouth once daily.     diclofenac sodium 1 % Gel  Commonly known as: VOLTAREN  Apply 2 g topically once daily. Apply 2 g over painful joints once or twice a day.     DULoxetine 30 MG capsule  Commonly known as: CYMBALTA  Take 1 capsule (30 mg total) by mouth once daily.     *  ergocalciferol (vitamin D2) 10 mcg (400 unit) Tab     * VITAMIN D2 50,000 unit Cap  Generic drug: ergocalciferol  Take 1 capsule (50,000 Units total) by mouth every 7 days.     FLUAD QUAD 2020-21(65Y UP)(PF) 60 mcg (15 mcg x 4)/0.5 mL Syrg  Generic drug: flu vac 2020 65up-[id number](PF)  Inject 0.5 mLs into the muscle.     fluocinolone 0.01 % external solution  Commonly known as: SYNALAR  AAA of scalp twice a day prn itching.     fluticasone propionate 50 mcg/actuation nasal spray  Commonly known as: FLONASE  2 sprays (100 mcg total) by Each Nare route once daily.     gabapentin 300 MG capsule  Commonly known as: NEURONTIN  TAKE ONE CAPSULE BY MOUTH THREE TIMES DAILY     HYDROcodone-acetaminophen  mg per tablet  Commonly known as: NORCO  Take 1 tablet by mouth every 6 (six) hours as needed for Pain.     iron-vitamin C 100-250 mg (ICAR-C) 100-250 mg Tab  TAKE ONE TABLET BY MOUTH EVERY DAY     levocetirizine 5 MG tablet  Commonly known as: XYZAL  Take 1 tablet (5 mg total) by mouth every evening.     levothyroxine 75 MCG tablet  Commonly known as: SYNTHROID  TAKE ONE TABLET BY MOUTH EVERY DAY BEFORE BREAKFAST     meloxicam 7.5 MG tablet  Commonly known as: MOBIC  TAKE ONE TABLET BY MOUTH EVERY DAY AS NEEDED FOR PAIN     metoprolol tartrate 25 MG tablet  Commonly known as: LOPRESSOR  TAKE ONE TABLET BY MOUTH TWICE DAILY     mirtazapine 15 MG disintegrating tablet  Commonly known as: REMERON SOL-TAB  DISSOLVE ONE TABLET BY MOUTH NIGHTLY     ONE DAILY MULTIVITAMIN per tablet  Generic drug: multivitamin     pravastatin 40 MG tablet  Commonly known as: PRAVACHOL  TAKE ONE TABLET BY MOUTH ONCE DAILY     ranitidine 150 MG capsule  Commonly known as: ZANTAC     sertraline 100 MG tablet  Commonly known as: ZOLOFT  TAKE 1 & 1/2 TABLETS BY MOUTH EVERY DAY     sodium bicarbonate 650 MG tablet  TAKE ONE TABLET BY MOUTH TWICE DAILY     sodium zirconium cyclosilicate 5 gram packet  Commonly known as: LOKELMA  Take 1 packet (5 g  "total) by mouth once daily. Mix entire contents of packet(s) into drinking glass containing >3 tablespoons of water; stir well and drink immediately. If powder remains in the drinking glass, add water and repeat until no powder remains to ensure entire dose is taken.     traMADoL 50 mg tablet  Commonly known as: ULTRAM  Take 1 tablet (50 mg total) by mouth every 12 (twelve) hours as needed for Pain.     tretinoin 0.025 % cream  Commonly known as: RETIN-A  Apply a pea-sized amount to the entire face at bedtime.  Use every third night and increase as tolerated to nightly.     triamcinolone acetonide 0.025% 0.025 % cream  Commonly known as: KENALOG  Apply topically 2 (two) times daily. PRN itching.     VITAMIN C 100 MG tablet  Generic drug: ascorbic acid (vitamin C)     ZINC ACETATE ORAL         * This list has 2 medication(s) that are the same as other medications prescribed for you. Read the directions carefully, and ask your doctor or other care provider to review them with you.                      Medical Decision Making           Medical Decision Making:   Clinical Tests:   Lab Tests: Ordered and Reviewed  Radiological Study: Ordered and Reviewed  Medical Tests: Ordered and Reviewed           Scribe Attestation:   Scribe #1: I performed the above scribed service and the documentation accurately describes the services I performed. I attest to the accuracy of the note 11/27/2020.    Attending:   Physician Attestation Statement for Scribe #1: I, Michelle De La Torre MD, personally performed the services described in this documentation, as scribed by Corinne Mack, in my presence, and it is both accurate and complete.          Clinical Impression       ICD-10-CM ICD-9-CM   1. Unstable angina  I20.0 411.1   2. Chest pain  R07.9 786.50       Disposition:   Disposition: Placed in Observation  Condition: Fair      Portions of this note may have been created with voice recognition software. Occasional "wrong-word" or " ""sound-a-like" substitutions may have occurred due to the inherent limitations of voice recognition software. Please, read the note carefully and recognize, using context, where substitutions have occurred.          Haley Vargas MD  11/29/20 0700       Haley Vargas MD  11/29/20 0701       Haley Vargas MD  11/29/20 0702    "

## 2020-11-27 NOTE — PROGRESS NOTES
"Subjective:   Patient ID:  Violet Swenson is a 80 y.o. female who presents for follow up of Chest Pain      HPI   9/17/2020   81 yo female with ;ymphoma  S/p pcae cad s/p stent ckd heart failure ios here for f/u she has low back pain limited has had recurrent episodes of intrascapular pain radiating to left arm feels like muscle spasm none with exertion. No chf symptoms no leg swelling tia claudication chest pain syncope near syncope/.no palpitation. Has some improvement in appetite  .  11/16  Violet Swenson is a 80 y.o. female patient with a h/o anemia, anxiety, atrial flutter, lymphoma large cell B, CHF, CAD, depression, GERD, gout, heart failure, HLD, HTN, hypothyroidism kidney disease, lung nodule, obesity, metronic pacemaker, polyneuropathy, who presents to the Emergency Department for evaluation of fatigue which onset this morning. Per [facility], the pt has been intermittently stuttering her speech and was hypoglycemic upon arriving on scene. Pt's daughter states that the pt woke up and was chatting to her before she left the house around 0800 this morning. The pt reports falling back asleep and waking up around 0930 feeling very weak and fatigued. She states, "when I rolled over after waking up, it felt like someone was pushing me back in bed." Associated sxs include R arm weakness, generalized weakness, slurred speech, and trouble ambulating. Patient denies any fever, SOB, CP, n/v, numbness, dizziness, and all other sxs at this time. In the ED, H/H 8.9/28.6, Creatinine 1.5, , TSH 4.087, CT head with no acute findings. Tele-stroke recommended MRI/MRA brain, MRA neck lipid panel, hemoglobin A1c. Add ASA to Plavix if x 30 days if no contraindication. Atorvastatin 40 mg daily. Neurology, PT/speech consults. Patient placed in observation for CVA rule out under the care of hospital medicine.      * No surgery found *       Hospital Course:   Place an observation for evaluation and treatment " of right arm weakness, slurred speech, and gait instability. Symptoms concerning for acute stroke. Initially perform a CT of the head which showed no acute findings. Patient was unable to perform an MRI or MRA due to having an implantable device. Empirically treated for transient ischemic attack versus stroke. Performed an interval CT scan of the head which was also negative. She was evaluated by neurology who assisted with management. Neurology determined that even though we were unable to get positive imaging findings that she should be treated as likely having had an acute stroke.  Physical and occupational therapy evaluated the patient and recommended outpatient therapy. Speech language pathology evaluated the patient and recommended no restrictions. Discharge plan to return home and continue medication plan outpatient physical therapy and follow-up with primary care as needed        11/27/2020  Had chest pain since yesterday it is tight all over chest no associated shortness of breath. Her bp is elevated she cannot get comfortable feels like tightness . Has no leg swelling her speech and vision improved but he rrt sided weakness still evident she is taking her antiplatelet. She has not taken her meds this  Morning. She is not feeling good during the vist the pain has been ongoing since 4 pm yesterday. I gave her a s/l nitro and she improved.   Past Medical History:   Diagnosis Date    Age-related osteoporosis without current pathological fracture 8/20/2018    Anemia     Anxiety     Arthritis     Atrial flutter     Cancer     lymphoma Large cell B    CHF (congestive heart failure)     Chronic anemia 4/26/2017    Chronic midline low back pain with right-sided sciatica 8/20/2018    Coronary artery disease     01/2015 [facility] patent LCX. 50% stenosis in LAD and RCA.      Depression     Disorder of kidney and ureter     Encounter for blood transfusion     GERD (gastroesophageal reflux disease)     Gout,  arthritis     Heart failure     Hx of psychiatric care     Hyperlipidemia     Hypertension     Hypothyroidism     Immune deficiency disorder     Kidney disease     Lung nodule     RML--stable    Obesity     Pacemaker     Metronic    Paroxysmal atrial fibrillation 3/15/2018    Pneumonia     Polyneuropathy     chemo induced    Psychiatric problem     Stroke 2020    Tobacco dependence     quit     Trouble in sleeping        Past Surgical History:   Procedure Laterality Date    APPENDECTOMY  1966 approx    CARDIAC PACEMAKER PLACEMENT  2015    CHOLECYSTECTOMY  1993    incidental at time of gastric bypass    COLON SURGERY Right 2017    hemicolectomy    COLONOSCOPY N/A 2017    Procedure: COLONOSCOPY;  Surgeon: Tye Enamorado MD;  Location: [facility];  Service: Endoscopy;  Laterality: N/A;    COLONOSCOPY N/A 2018    Procedure: COLONOSCOPY;  Surgeon: Saúl Arthur III, MD;  Location: [facility];  Service: Endoscopy;  Laterality: N/A;    CORONARY ANGIOPLASTY  2014    CORONARY STENT PLACEMENT  2014    ESOPHAGOGASTRODUODENOSCOPY N/A 2018    Procedure: EGD (ESOPHAGOGASTRODUODENOSCOPY);  Surgeon: Saúl Arthur III, MD;  Location: [facility];  Service: Endoscopy;  Laterality: N/A;    GASTRIC BYPASS      with incidental choly    HERNIA REPAIR      INJECTION OF ANESTHETIC AGENT INTO SACROILIAC JOINT Right 10/8/2020    Procedure: Right BLOCK, SACROILIAC JOINT with RN IV sedation;  Surgeon: Madi Anton MD;  Location: [facility];  Service: Pain Management;  Laterality: Right;    JOINT REPLACEMENT Bilateral     3 months apart    TONSILLECTOMY         Social History     Tobacco Use    Smoking status: Former Smoker     Packs/day: 2.00     Years: 6.00     Pack years: 12.00     Quit date: 3/15/1976     Years since quittin.7    Smokeless tobacco: Never Used   Substance Use Topics    Alcohol use: No     Alcohol/week: 0.0 standard drinks     "Drug use: No       Family History   Problem Relation Age of Onset    Heart disease Mother     Hypertension Mother     Cataracts Mother     Stomach cancer Father         "ulcers that turned to cancer"    Cancer Father         stomach    Pancreatic cancer Sister     Cancer Sister         pancreatic    Leukemia Brother         "leukemia which led to intestinal cancer"    Cataracts Brother     Cancer Brother         leukemia then later stomach cancer    Drug abuse Son     Cancer Son         prostate cancer    Prostate cancer Son     COPD Daughter     Asthma Daughter     Hypertension Maternal Grandfather     Stroke Maternal Grandfather     Alcohol abuse Neg Hx     Diabetes Neg Hx     Mental retardation Neg Hx     Mental illness Neg Hx        Current Outpatient Medications   Medication Sig    ascorbic acid, vitamin C, (VITAMIN C) 100 MG tablet Take by mouth once daily.    aspirin (ECOTRIN) 81 MG EC tablet Take 81 mg by mouth nightly.     busPIRone (BUSPAR) 7.5 MG tablet TAKE ONE TABLET BY MOUTH THREE TIMES DAILY    calcitRIOL (ROCALTROL) 0.25 MCG Cap TAKE ONE CAPSULE BY MOUTH EVERY MONDAY, WEDNESDAY AND FRIDAY    clopidogreL (PLAVIX) 75 mg tablet Take 1 tablet (75 mg total) by mouth once daily.    diclofenac sodium 1 % Gel Apply 2 g topically once daily. Apply 2 g over painful joints once or twice a day.    DULoxetine (CYMBALTA) 30 MG capsule Take 1 capsule (30 mg total) by mouth once daily.    ergocalciferol, vitamin D2, 400 unit Tab Take by mouth.    flu vac 2020 [id number],PF, (FLUAD QUAD 2020-21,65Y UP,,PF,) 60 mcg (15 mcg x 4)/0.5 mL Syrg Inject 0.5 mLs into the muscle.    fluocinolone (SYNALAR) 0.01 % external solution AAA of scalp twice a day prn itching.    fluticasone propionate (FLONASE) 50 mcg/actuation nasal spray 2 sprays (100 mcg total) by Each Nare route once daily.    gabapentin (NEURONTIN) 300 MG capsule TAKE ONE CAPSULE BY MOUTH THREE TIMES DAILY    " HYDROcodone-acetaminophen (NORCO)  mg per tablet Take 1 tablet by mouth every 6 (six) hours as needed for Pain.    iron-vitamin C 100-250 mg, ICAR-C, 100-250 mg Tab TAKE ONE TABLET BY MOUTH EVERY DAY    levocetirizine (XYZAL) 5 MG tablet Take 1 tablet (5 mg total) by mouth every evening.    levothyroxine (SYNTHROID) 75 MCG tablet TAKE ONE TABLET BY MOUTH EVERY DAY BEFORE BREAKFAST    meloxicam (MOBIC) 7.5 MG tablet TAKE ONE TABLET BY MOUTH EVERY DAY AS NEEDED FOR PAIN    metoprolol tartrate (LOPRESSOR) 25 MG tablet TAKE ONE TABLET BY MOUTH TWICE DAILY    mirtazapine (REMERON SOL-TAB) 15 MG disintegrating tablet DISSOLVE ONE TABLET BY MOUTH NIGHTLY    multivitamin (ONE DAILY MULTIVITAMIN) per tablet Take 1 tablet by mouth once daily.    pravastatin (PRAVACHOL) 40 MG tablet TAKE ONE TABLET BY MOUTH ONCE DAILY    ranitidine (ZANTAC) 150 MG capsule Take 150 mg by mouth nightly.     sertraline (ZOLOFT) 100 MG tablet TAKE 1 & 1/2 TABLETS BY MOUTH EVERY DAY    sodium bicarbonate 650 MG tablet TAKE ONE TABLET BY MOUTH TWICE DAILY    sodium zirconium cyclosilicate (LOKELMA) 5 gram packet Take 1 packet (5 g total) by mouth once daily. Mix entire contents of packet(s) into drinking glass containing >3 tablespoons of water; stir well and drink immediately. If powder remains in the drinking glass, add water and repeat until no powder remains to ensure entire dose is taken.    traMADoL (ULTRAM) 50 mg tablet Take 1 tablet (50 mg total) by mouth every 12 (twelve) hours as needed for Pain.    tretinoin (RETIN-A) 0.025 % cream Apply a pea-sized amount to the entire face at bedtime.  Use every third night and increase as tolerated to nightly.    triamcinolone acetonide 0.025% (KENALOG) 0.025 % cream Apply topically 2 (two) times daily. PRN itching.    VITAMIN D2 1,250 mcg (50,000 unit) capsule Take 1 capsule (50,000 Units total) by mouth every 7 days.    ZINC ACETATE ORAL Take 250 mg by mouth once daily.       Current Facility-Administered Medications   Medication    denosumab (PROLIA) injection 60 mg     Current Outpatient Medications on File Prior to Visit   Medication Sig    ascorbic acid, vitamin C, (VITAMIN C) 100 MG tablet Take by mouth once daily.    aspirin (ECOTRIN) 81 MG EC tablet Take 81 mg by mouth nightly.     busPIRone (BUSPAR) 7.5 MG tablet TAKE ONE TABLET BY MOUTH THREE TIMES DAILY    calcitRIOL (ROCALTROL) 0.25 MCG Cap TAKE ONE CAPSULE BY MOUTH EVERY MONDAY, WEDNESDAY AND FRIDAY    clopidogreL (PLAVIX) 75 mg tablet Take 1 tablet (75 mg total) by mouth once daily.    diclofenac sodium 1 % Gel Apply 2 g topically once daily. Apply 2 g over painful joints once or twice a day.    DULoxetine (CYMBALTA) 30 MG capsule Take 1 capsule (30 mg total) by mouth once daily.    ergocalciferol, vitamin D2, 400 unit Tab Take by mouth.    flu vac 2020 [id number],PF, (FLUAD QUAD 2020-21,65Y UP,,PF,) 60 mcg (15 mcg x 4)/0.5 mL Syrg Inject 0.5 mLs into the muscle.    fluocinolone (SYNALAR) 0.01 % external solution AAA of scalp twice a day prn itching.    fluticasone propionate (FLONASE) 50 mcg/actuation nasal spray 2 sprays (100 mcg total) by Each Nare route once daily.    gabapentin (NEURONTIN) 300 MG capsule TAKE ONE CAPSULE BY MOUTH THREE TIMES DAILY    HYDROcodone-acetaminophen (NORCO)  mg per tablet Take 1 tablet by mouth every 6 (six) hours as needed for Pain.    iron-vitamin C 100-250 mg, ICAR-C, 100-250 mg Tab TAKE ONE TABLET BY MOUTH EVERY DAY    levocetirizine (XYZAL) 5 MG tablet Take 1 tablet (5 mg total) by mouth every evening.    levothyroxine (SYNTHROID) 75 MCG tablet TAKE ONE TABLET BY MOUTH EVERY DAY BEFORE BREAKFAST    meloxicam (MOBIC) 7.5 MG tablet TAKE ONE TABLET BY MOUTH EVERY DAY AS NEEDED FOR PAIN    metoprolol tartrate (LOPRESSOR) 25 MG tablet TAKE ONE TABLET BY MOUTH TWICE DAILY    mirtazapine (REMERON SOL-TAB) 15 MG disintegrating tablet DISSOLVE ONE TABLET BY MOUTH  NIGHTLY    multivitamin (ONE DAILY MULTIVITAMIN) per tablet Take 1 tablet by mouth once daily.    pravastatin (PRAVACHOL) 40 MG tablet TAKE ONE TABLET BY MOUTH ONCE DAILY    ranitidine (ZANTAC) 150 MG capsule Take 150 mg by mouth nightly.     sertraline (ZOLOFT) 100 MG tablet TAKE 1 & 1/2 TABLETS BY MOUTH EVERY DAY    sodium bicarbonate 650 MG tablet TAKE ONE TABLET BY MOUTH TWICE DAILY    sodium zirconium cyclosilicate (LOKELMA) 5 gram packet Take 1 packet (5 g total) by mouth once daily. Mix entire contents of packet(s) into drinking glass containing >3 tablespoons of water; stir well and drink immediately. If powder remains in the drinking glass, add water and repeat until no powder remains to ensure entire dose is taken.    traMADoL (ULTRAM) 50 mg tablet Take 1 tablet (50 mg total) by mouth every 12 (twelve) hours as needed for Pain.    tretinoin (RETIN-A) 0.025 % cream Apply a pea-sized amount to the entire face at bedtime.  Use every third night and increase as tolerated to nightly.    triamcinolone acetonide 0.025% (KENALOG) 0.025 % cream Apply topically 2 (two) times daily. PRN itching.    VITAMIN D2 1,250 mcg (50,000 unit) capsule Take 1 capsule (50,000 Units total) by mouth every 7 days.    ZINC ACETATE ORAL Take 250 mg by mouth once daily.      Current Facility-Administered Medications on File Prior to Visit   Medication    denosumab (PROLIA) injection 60 mg     Review of patient's allergies indicates:   Allergen Reactions    Corticosteroids (glucocorticoids) Nausea Only and Other (See Comments)     Stomach pain, dizziness, headache    Oxycodone Other (See Comments)     Blood pressure dropped     Review of Systems   Constitution: Negative for diaphoresis, malaise/fatigue and weight gain.   HENT: Negative for hoarse voice.    Eyes: Negative for double vision and visual disturbance.   Cardiovascular: Positive for chest pain. Negative for claudication, cyanosis, dyspnea on exertion, irregular  heartbeat, leg swelling, near-syncope, orthopnea, palpitations, paroxysmal nocturnal dyspnea and syncope.   Respiratory: Negative for cough, hemoptysis, shortness of breath and snoring.    Hematologic/Lymphatic: Negative for bleeding problem. Does not bruise/bleed easily.   Skin: Negative for color change and poor wound healing.   Musculoskeletal: Negative for muscle cramps, muscle weakness and myalgias.   Gastrointestinal: Negative for bloating, abdominal pain, change in bowel habit, diarrhea, heartburn, hematemesis, hematochezia, melena and nausea.   Neurological: Negative for excessive daytime sleepiness, dizziness, headaches, light-headedness, loss of balance, numbness and weakness.   Psychiatric/Behavioral: Negative for memory loss. The patient does not have insomnia.    Allergic/Immunologic: Negative for hives.       Objective:   Physical Exam   Constitutional: She is oriented to person, place, and time. She appears well-developed and well-nourished. She does not appear ill. No distress.   HENT:   Head: Normocephalic and atraumatic.   Eyes: Pupils are equal, round, and reactive to light. EOM are normal. No scleral icterus.   Neck: Normal range of motion. Neck supple. Normal carotid pulses, no hepatojugular reflux and no JVD present. Carotid bruit is not present. No tracheal deviation present. No thyromegaly present.   Cardiovascular: Normal rate, regular rhythm, normal heart sounds, intact distal pulses and normal pulses. Exam reveals no gallop and no friction rub.   No murmur heard.  Pulmonary/Chest: Effort normal and breath sounds normal. No respiratory distress. She has no wheezes. She has no rhonchi. She has no rales. She exhibits no tenderness.   Pacer site well healeed.    Abdominal: Soft. Normal appearance, normal aorta and bowel sounds are normal. She exhibits no distension, no abdominal bruit, no ascites and no pulsatile midline mass. There is no hepatomegaly. There is no abdominal tenderness.  "  Musculoskeletal:         General: No edema.      Right shoulder: She exhibits no deformity.   Neurological: She is alert and oriented to person, place, and time. She has normal strength. No cranial nerve deficit. Coordination normal.   Skin: Skin is warm. No rash noted. She is not diaphoretic. No cyanosis or erythema. Nails show no clubbing.   Psychiatric: She has a normal mood and affect. Her speech is normal and behavior is normal.     Vitals:    11/27/20 1327 11/27/20 1330   BP: (!) 174/108 (!) 170/100   BP Location: Left arm Left arm   Patient Position: Sitting Sitting   BP Method: Large (Manual) Large (Manual)   Pulse: 87    SpO2: 97%    Weight: 61.7 kg (136 lb 0.4 oz)    Height: 5' 4" (1.626 m)      Lab Results   Component Value Date    CHOL 101 (L) 11/16/2020    CHOL 118 (L) 09/14/2020    CHOL 133 08/25/2020     Lab Results   Component Value Date    HDL 42 11/16/2020    HDL 55 09/14/2020    HDL 55 08/25/2020     Lab Results   Component Value Date    LDLCALC 39.8 (L) 11/16/2020    LDLCALC 45.8 (L) 09/14/2020    LDLCALC 59.0 (L) 08/25/2020     Lab Results   Component Value Date    TRIG 96 11/16/2020    TRIG 86 09/14/2020    TRIG 95 08/25/2020     Lab Results   Component Value Date    CHOLHDL 41.6 11/16/2020    CHOLHDL 46.6 09/14/2020    CHOLHDL 41.4 08/25/2020       Chemistry        Component Value Date/Time     11/17/2020 0654    K 5.5 (H) 11/17/2020 0654     (H) 11/17/2020 0654    CO2 20 (L) 11/17/2020 0654    BUN 18 11/17/2020 0654    CREATININE 1.3 11/17/2020 0654    GLU 73 11/17/2020 0654        Component Value Date/Time    CALCIUM 8.0 (L) 11/17/2020 0654    ALKPHOS 74 11/17/2020 0654    AST 43 (H) 11/17/2020 0654    ALT 25 11/17/2020 0654    BILITOT 0.3 11/17/2020 0654    ESTGFRAFRICA 45 (A) 11/17/2020 0654    EGFRNONAA 39 (A) 11/17/2020 0654          Lab Results   Component Value Date    TSH 4.087 (H) 11/16/2020     Lab Results   Component Value Date    INR 1.0 11/17/2020    INR 1.0 " 11/16/2020    INR 1.0 11/30/2018     Lab Results   Component Value Date    WBC 7.50 11/17/2020    HGB 9.2 (L) 11/17/2020    HCT 29.9 (L) 11/17/2020     (H) 11/17/2020     (H) 11/17/2020     BMP  Sodium   Date Value Ref Range Status   11/17/2020 140 136 - 145 mmol/L Final     Potassium   Date Value Ref Range Status   11/17/2020 5.5 (H) 3.5 - 5.1 mmol/L Final     Chloride   Date Value Ref Range Status   11/17/2020 114 (H) 95 - 110 mmol/L Final     CO2   Date Value Ref Range Status   11/17/2020 20 (L) 23 - 29 mmol/L Final     BUN   Date Value Ref Range Status   11/17/2020 18 8 - 23 mg/dL Final     Creatinine   Date Value Ref Range Status   11/17/2020 1.3 0.5 - 1.4 mg/dL Final     Calcium   Date Value Ref Range Status   11/17/2020 8.0 (L) 8.7 - 10.5 mg/dL Final     Anion Gap   Date Value Ref Range Status   11/17/2020 6 (L) 8 - 16 mmol/L Final     eGFR if    Date Value Ref Range Status   11/17/2020 45 (A) >60 mL/min/1.73 m^2 Final     eGFR if non    Date Value Ref Range Status   11/17/2020 39 (A) >60 mL/min/1.73 m^2 Final     Comment:     Calculation used to obtain the estimated glomerular filtration  rate (eGFR) is the CKD-EPI equation.        CrCl cannot be calculated (Patient's most recent lab result is older than the maximum 7 days allowed.).    Assessment:     1. Chest pain syndrome    2. Essential hypertension    3. Pacemaker    4. Mixed hyperlipidemia    5. Chronic anemia    6. 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 Stage 3 chronic kidney disease, unspecified whether stage 3a or 3b CKD    8. Paroxysmal atrial fibrillation    9. History of congestive heart failure    10. Chronic midline low back pain with right-sided sciatica    11. Cardiac pacemaker in situ    12. Chronic diastolic heart failure      S/l nitro relieved chest pain she still has residual chest tightenss will get ems and transfer to [facility].   Plan:     To [facility] for admission and  evaluation.

## 2020-11-28 LAB
TROPONIN I SERPL DL<=0.01 NG/ML-MCNC: 0.02 NG/ML (ref 0–0.03)
TROPONIN I SERPL DL<=0.01 NG/ML-MCNC: 0.03 NG/ML (ref 0–0.03)

## 2020-11-28 PROCEDURE — 25000003 PHARM REV CODE 250: Mod: HCNC | Performed by: INTERNAL MEDICINE

## 2020-11-28 PROCEDURE — 36415 COLL VENOUS BLD VENIPUNCTURE: CPT | Mod: HCNC

## 2020-11-28 PROCEDURE — 84484 ASSAY OF TROPONIN QUANT: CPT | Mod: HCNC

## 2020-11-28 PROCEDURE — G0378 HOSPITAL OBSERVATION PER HR: HCPCS | Mod: HCNC

## 2020-11-28 PROCEDURE — 99220 PR INITIAL OBSERVATION CARE,LEVL III: ICD-10-PCS | Mod: 25,HCNC,, | Performed by: INTERNAL MEDICINE

## 2020-11-28 PROCEDURE — 84484 ASSAY OF TROPONIN QUANT: CPT | Mod: 91,HCNC

## 2020-11-28 PROCEDURE — 99220 PR INITIAL OBSERVATION CARE,LEVL III: CPT | Mod: 25,HCNC,, | Performed by: INTERNAL MEDICINE

## 2020-11-28 RX ADMIN — GABAPENTIN 300 MG: 300 CAPSULE ORAL at 02:11

## 2020-11-28 RX ADMIN — DULOXETINE HYDROCHLORIDE 30 MG: 30 CAPSULE, DELAYED RELEASE ORAL at 08:11

## 2020-11-28 RX ADMIN — METOPROLOL TARTRATE 25 MG: 25 TABLET, FILM COATED ORAL at 08:11

## 2020-11-28 RX ADMIN — BUSPIRONE HYDROCHLORIDE 7.5 MG: 5 TABLET ORAL at 02:11

## 2020-11-28 RX ADMIN — BUSPIRONE HYDROCHLORIDE 7.5 MG: 5 TABLET ORAL at 08:11

## 2020-11-28 RX ADMIN — SERTRALINE HYDROCHLORIDE 150 MG: 50 TABLET ORAL at 08:11

## 2020-11-28 RX ADMIN — MIRTAZAPINE 15 MG: 15 TABLET, FILM COATED ORAL at 08:11

## 2020-11-28 RX ADMIN — PRAVASTATIN SODIUM 40 MG: 20 TABLET ORAL at 08:11

## 2020-11-28 RX ADMIN — LEVOTHYROXINE SODIUM 75 MCG: 75 TABLET ORAL at 05:11

## 2020-11-28 RX ADMIN — SODIUM BICARBONATE 650 MG: 650 TABLET ORAL at 08:11

## 2020-11-28 RX ADMIN — GABAPENTIN 300 MG: 300 CAPSULE ORAL at 08:11

## 2020-11-28 RX ADMIN — ASPIRIN 81 MG: 81 TABLET, COATED ORAL at 08:11

## 2020-11-28 RX ADMIN — CLOPIDOGREL 75 MG: 75 TABLET, FILM COATED ORAL at 08:11

## 2020-11-28 NOTE — SUBJECTIVE & OBJECTIVE
Past Medical History:   Diagnosis Date    Age-related osteoporosis without current pathological fracture 8/20/2018    Anemia     Anxiety     Arthritis     Atrial flutter     Cancer     lymphoma Large cell B    CHF (congestive heart failure)     Chronic anemia 4/26/2017    Chronic midline low back pain with right-sided sciatica 8/20/2018    Coronary artery disease     01/2015 LHC patent LCX. 50% stenosis in LAD and RCA.      Depression     Disorder of kidney and ureter     Encounter for blood transfusion     GERD (gastroesophageal reflux disease)     Gout, arthritis     Heart failure     Hx of psychiatric care     Hyperlipidemia     Hypertension     Hypothyroidism     Immune deficiency disorder     Kidney disease     Lung nodule 2014    RML--stable    Obesity     Pacemaker     Metronic    Paroxysmal atrial fibrillation 3/15/2018    Pneumonia     Polyneuropathy     chemo induced    Psychiatric problem     Stroke 11/16/2020    Tobacco dependence     quit 1976    Trouble in sleeping     Unstable angina 11/27/2020       Past Surgical History:   Procedure Laterality Date    APPENDECTOMY  1966 approx    CARDIAC PACEMAKER PLACEMENT  01/22/2015    CHOLECYSTECTOMY  1993    incidental at time of gastric bypass    COLON SURGERY Right 2017    hemicolectomy    COLONOSCOPY N/A 4/6/2017    Procedure: COLONOSCOPY;  Surgeon: Tye Enamorado MD;  Location: [facility];  Service: Endoscopy;  Laterality: N/A;    COLONOSCOPY N/A 11/28/2018    Procedure: COLONOSCOPY;  Surgeon: Saúl Arthur III, MD;  Location: [facility];  Service: Endoscopy;  Laterality: N/A;    CORONARY ANGIOPLASTY  02/2014    CORONARY STENT PLACEMENT  02/05/2014    ESOPHAGOGASTRODUODENOSCOPY N/A 11/28/2018    Procedure: EGD (ESOPHAGOGASTRODUODENOSCOPY);  Surgeon: Saúl Arthur III, MD;  Location: [facility];  Service: Endoscopy;  Laterality: N/A;    GASTRIC BYPASS  1993    with incidental choly    HERNIA REPAIR       INJECTION OF ANESTHETIC AGENT INTO SACROILIAC JOINT Right 10/8/2020    Procedure: Right BLOCK, SACROILIAC JOINT with RN IV sedation;  Surgeon: Madi Anton MD;  Location: [facility];  Service: Pain Management;  Laterality: Right;    JOINT REPLACEMENT Bilateral 2009    3 months apart    TONSILLECTOMY  1959       Review of patient's allergies indicates:   Allergen Reactions    Corticosteroids (glucocorticoids) Nausea Only and Other (See Comments)     Stomach pain, dizziness, headache    Oxycodone Other (See Comments)     Blood pressure dropped       Current Facility-Administered Medications on File Prior to Encounter   Medication    denosumab (PROLIA) injection 60 mg     Current Outpatient Medications on File Prior to Encounter   Medication Sig    ascorbic acid, vitamin C, (VITAMIN C) 100 MG tablet Take by mouth once daily.    aspirin (ECOTRIN) 81 MG EC tablet Take 81 mg by mouth nightly.     busPIRone (BUSPAR) 7.5 MG tablet TAKE ONE TABLET BY MOUTH THREE TIMES DAILY    calcitRIOL (ROCALTROL) 0.25 MCG Cap TAKE ONE CAPSULE BY MOUTH EVERY MONDAY, WEDNESDAY AND FRIDAY    clopidogreL (PLAVIX) 75 mg tablet Take 1 tablet (75 mg total) by mouth once daily.    diclofenac sodium 1 % Gel Apply 2 g topically once daily. Apply 2 g over painful joints once or twice a day.    DULoxetine (CYMBALTA) 30 MG capsule Take 1 capsule (30 mg total) by mouth once daily.    ergocalciferol, vitamin D2, 400 unit Tab Take by mouth.    flu vac 2020 [id number]-[id number],PF, (FLUAD QUAD 2020-21,65Y UP,,PF,) 60 mcg (15 mcg x 4)/0.5 mL Syrg Inject 0.5 mLs into the muscle.    fluocinolone (SYNALAR) 0.01 % external solution AAA of scalp twice a day prn itching.    fluticasone propionate (FLONASE) 50 mcg/actuation nasal spray 2 sprays (100 mcg total) by Each Nare route once daily.    gabapentin (NEURONTIN) 300 MG capsule TAKE ONE CAPSULE BY MOUTH THREE TIMES DAILY    HYDROcodone-acetaminophen (NORCO)  mg per tablet Take 1 tablet by  mouth every 6 (six) hours as needed for Pain.    iron-vitamin C 100-250 mg, ICAR-C, 100-250 mg Tab TAKE ONE TABLET BY MOUTH EVERY DAY    levocetirizine (XYZAL) 5 MG tablet Take 1 tablet (5 mg total) by mouth every evening.    levothyroxine (SYNTHROID) 75 MCG tablet TAKE ONE TABLET BY MOUTH EVERY DAY BEFORE BREAKFAST    meloxicam (MOBIC) 7.5 MG tablet TAKE ONE TABLET BY MOUTH EVERY DAY AS NEEDED FOR PAIN    metoprolol tartrate (LOPRESSOR) 25 MG tablet TAKE ONE TABLET BY MOUTH TWICE DAILY    mirtazapine (REMERON SOL-TAB) 15 MG disintegrating tablet DISSOLVE ONE TABLET BY MOUTH NIGHTLY    multivitamin (ONE DAILY MULTIVITAMIN) per tablet Take 1 tablet by mouth once daily.    pravastatin (PRAVACHOL) 40 MG tablet TAKE ONE TABLET BY MOUTH ONCE DAILY    ranitidine (ZANTAC) 150 MG capsule Take 150 mg by mouth nightly.     sertraline (ZOLOFT) 100 MG tablet TAKE 1 & 1/2 TABLETS BY MOUTH EVERY DAY    sodium bicarbonate 650 MG tablet TAKE ONE TABLET BY MOUTH TWICE DAILY    sodium zirconium cyclosilicate (LOKELMA) 5 gram packet Take 1 packet (5 g total) by mouth once daily. Mix entire contents of packet(s) into drinking glass containing >3 tablespoons of water; stir well and drink immediately. If powder remains in the drinking glass, add water and repeat until no powder remains to ensure entire dose is taken.    traMADoL (ULTRAM) 50 mg tablet Take 1 tablet (50 mg total) by mouth every 12 (twelve) hours as needed for Pain.    tretinoin (RETIN-A) 0.025 % cream Apply a pea-sized amount to the entire face at bedtime.  Use every third night and increase as tolerated to nightly.    triamcinolone acetonide 0.025% (KENALOG) 0.025 % cream Apply topically 2 (two) times daily. PRN itching.    VITAMIN D2 1,250 mcg (50,000 unit) capsule Take 1 capsule (50,000 Units total) by mouth every 7 days.    ZINC ACETATE ORAL Take 250 mg by mouth once daily.      Family History     Problem Relation (Age of Onset)    Asthma Daughter     COPD Daughter    Cancer Father, Sister, Brother, Son    Cataracts Mother, Brother    Drug abuse Son    Heart disease Mother    Hypertension Mother, Maternal Grandfather    Leukemia Brother    Pancreatic cancer Sister    Prostate cancer Son    Stomach cancer Father    Stroke Maternal Grandfather        Tobacco Use    Smoking status: Former Smoker     Packs/day: 2.00     Years: 6.00     Pack years: 12.00     Quit date: 3/15/1976     Years since quittin.7    Smokeless tobacco: Never Used   Substance and Sexual Activity    Alcohol use: No     Alcohol/week: 0.0 standard drinks    Drug use: No    Sexual activity: Never     Review of Systems   Constitutional: Positive for fatigue. Negative for chills.   HENT: Negative for congestion, ear pain, facial swelling, sinus pressure and sore throat.    Eyes: Negative for pain.   Respiratory: Negative for apnea, chest tightness, shortness of breath and stridor.    Cardiovascular: Positive for chest pain. Negative for palpitations and leg swelling.   Gastrointestinal: Negative for abdominal distention, abdominal pain, diarrhea and nausea.   Endocrine: Negative for polydipsia and polyphagia.   Genitourinary: Negative for decreased urine volume, difficulty urinating, frequency and genital sores.   Musculoskeletal: Negative for arthralgias and gait problem.   Neurological: Negative for light-headedness and headaches.   Hematological: Negative for adenopathy.   Psychiatric/Behavioral: Negative for agitation, confusion and decreased concentration.     Objective:     Vital Signs (Most Recent):  Temp: 98.6 °F (37 °C) (20 1450)  Pulse: 83 (20)  Resp: 15 (20)  BP: (!) 142/80 (20)  SpO2: 96 % (20) Vital Signs (24h Range):  Temp:  [98.6 °F (37 °C)] 98.6 °F (37 °C)  Pulse:  [73-94] 83  Resp:  [11-20] 15  SpO2:  [96 %-98 %] 96 %  BP: (123-185)/() 142/80        There is no height or weight on file to calculate BMI.    Physical  Exam  Vitals signs and nursing note reviewed.   Constitutional:       Appearance: She is well-developed.   HENT:      Head: Normocephalic and atraumatic.   Eyes:      Pupils: Pupils are equal, round, and reactive to light.   Neck:      Musculoskeletal: Normal range of motion and neck supple.      Thyroid: No thyromegaly.      Trachea: No tracheal deviation.   Cardiovascular:      Rate and Rhythm: Normal rate and regular rhythm.      Comments: Has pacemaker  Pulmonary:      Effort: No respiratory distress.      Breath sounds: No wheezing or rales.   Abdominal:      General: Bowel sounds are normal. There is no distension.      Palpations: Abdomen is soft.      Tenderness: There is no abdominal tenderness.   Skin:     General: Skin is warm and dry.      Coloration: Skin is not pale.   Neurological:      Mental Status: She is alert and oriented to person, place, and time.      Cranial Nerves: No cranial nerve deficit.      Coordination: Coordination normal.      Deep Tendon Reflexes: Reflexes are normal and symmetric.   Psychiatric:         Thought Content: Thought content normal.         Judgment: Judgment normal.           CRANIAL NERVES     CN III, IV, VI   Pupils are equal, round, and reactive to light.       Significant Labs:   Blood Culture: No results for input(s): LABBLOO in the last 48 hours.  BMP:   Recent Labs   Lab 11/27/20  1523         K 5.0   *   CO2 21*   BUN 20   CREATININE 1.3   CALCIUM 9.1     CBC:   Recent Labs   Lab 11/27/20  1523   WBC 9.83   HGB 11.0*   HCT 35.2*        All pertinent labs within the past 24 hours have been reviewed.    Significant Imaging: I have reviewed and interpreted all pertinent imaging results/findings within the past 24 hours.

## 2020-11-28 NOTE — SUBJECTIVE & OBJECTIVE
Interval History:     Review of Systems   Constitutional: Positive for fatigue. Negative for chills.   HENT: Negative for congestion, ear pain, facial swelling, sinus pressure and sore throat.    Eyes: Negative for pain.   Respiratory: Negative for apnea, chest tightness, shortness of breath and stridor.    Cardiovascular: Negative for chest pain, palpitations and leg swelling.   Gastrointestinal: Negative for abdominal distention, abdominal pain, diarrhea and nausea.   Endocrine: Negative for polydipsia and polyphagia.   Genitourinary: Negative for decreased urine volume, difficulty urinating, frequency and genital sores.   Musculoskeletal: Negative for arthralgias and gait problem.   Neurological: Negative for light-headedness and headaches.   Hematological: Negative for adenopathy.   Psychiatric/Behavioral: Negative for agitation, confusion and decreased concentration.     Objective:     Vital Signs (Most Recent):  Temp: 98.2 °F (36.8 °C) (11/28/20 1120)  Pulse: 68 (11/28/20 1120)  Resp: 18 (11/28/20 1120)  BP: 128/64 (11/28/20 1120)  SpO2: 97 % (11/28/20 1120) Vital Signs (24h Range):  Temp:  [97.7 °F (36.5 °C)-98.7 °F (37.1 °C)] 98.2 °F (36.8 °C)  Pulse:  [68-94] 68  Resp:  [11-20] 18  SpO2:  [96 %-98 %] 97 %  BP: (123-185)/() 128/64     Weight: 60 kg (132 lb 4.4 oz)  Body mass index is 22.71 kg/m².    Intake/Output Summary (Last 24 hours) at 11/28/2020 1308  Last data filed at 11/28/2020 0600  Gross per 24 hour   Intake 200 ml   Output --   Net 200 ml      Physical Exam  Vitals signs and nursing note reviewed.   Constitutional:       Appearance: She is well-developed.   HENT:      Head: Normocephalic and atraumatic.   Eyes:      Pupils: Pupils are equal, round, and reactive to light.   Neck:      Musculoskeletal: Normal range of motion and neck supple.      Thyroid: No thyromegaly.      Trachea: No tracheal deviation.   Cardiovascular:      Rate and Rhythm: Normal rate and regular rhythm.      Comments:  Has pacemaker  Pulmonary:      Effort: No respiratory distress.      Breath sounds: No wheezing or rales.   Abdominal:      General: Bowel sounds are normal. There is no distension.      Palpations: Abdomen is soft.      Tenderness: There is no abdominal tenderness.   Skin:     General: Skin is warm and dry.      Coloration: Skin is not pale.   Neurological:      Mental Status: She is alert and oriented to person, place, and time.      Cranial Nerves: No cranial nerve deficit.      Coordination: Coordination normal.      Deep Tendon Reflexes: Reflexes are normal and symmetric.   Psychiatric:         Thought Content: Thought content normal.         Judgment: Judgment normal.         Significant Labs: All pertinent labs within the past 24 hours have been reviewed.    Significant Imaging: I have reviewed all pertinent imaging results/findings within the past 24 hours.

## 2020-11-28 NOTE — HPI
History of Present Illness: Violet Swenson is a 80 y.o. female patient with PMHx of anemia, anxiety, Afib, CHF, GERD, HTN, CAD, CKD, and stroke who presents to the Emergency Department for CP which onset gradually yesterday. Pt was seen by Dr. Gil (Cardiology) today who referred the pt to the ED for further evaluation. Symptoms are intermittent and moderate in severity. No mitigating or exacerbating factors reported. Associated sxs include N/V and SOB. Patient denies any fever, chills, CP, diarrhea, abd pain, back pain, neck pain, HA, dizziness, and all other sxs at this time. Prior Tx includes ASA and NTG paste with some relief. No further complaints or concerns at this time.   Patient with known cardiac disease and stent in [facility] and mild to moderate disease in the LAD and RCA in 2017. Now with ACS and positive troponin .  History of pacemaker and EKG is consistent with paced heart rate.

## 2020-11-28 NOTE — H&P
[facility] Medicine  History & Physical    Patient Name: Violet Swenson  MRN: [id number]  Admission Date: 11/27/2020  Attending Physician: Haley Vargas MD   Primary Care Provider: Marti Coronel MD         Patient information was obtained from patient, past medical records and ER records.     Subjective:     Principal Problem:Chest pain    Chief Complaint:   Chief Complaint   Patient presents with    Chest Pain     sent by [facility] today with resolved CP that started yesterday        HPI:    80 year old woman with history of anemia , anxiety , Afib, CHF, GERD, HTN, CAD s/p PTCA, CKD stage 3 , and stroke who presents to the Emergency Department for CP which onset gradually yesterday at around 4pm. Chest pain was described as sharp, non radiating , associated with nausea ,9/10 in intensity at the initial period . The pain resolved with nitro. She had a repeat episode of chest pain this morning . She was seen in the Cardiology clinic by Dr Gil and was advised to come to the ED for further evaluation.  Prior Tx includes ASA and NTG paste with some relief.   Previous imaging test and lab test reviewed -  11/2020- ECHO-  · There is left ventricular concentric hypertrophy.  · With normal right ventricular systolic function.  · With normal systolic function. The estimated ejection fraction is 55%.  · Grade II diastolic dysfunction.    EKG showed- Normal sinus rhythm  Right axis deviation  · Nonspecific intraventricular   Troponin -  Component      Latest Ref Rng & Units 11/27/2020 11/27/2020           6:25 PM  3:23 PM   Troponin I      0.000 - 0.026 ng/mL 0.022 0.011   She will be placed on observation .    Past Medical History:   Diagnosis Date    Age-related osteoporosis without current pathological fracture 8/20/2018    Anemia     Anxiety     Arthritis     Atrial flutter     Cancer     lymphoma Large cell B    CHF (congestive heart failure)     Chronic anemia 4/26/2017     Chronic midline low back pain with right-sided sciatica 8/20/2018    Coronary artery disease     01/2015 C patent LCX. 50% stenosis in LAD and RCA.      Depression     Disorder of kidney and ureter     Encounter for blood transfusion     GERD (gastroesophageal reflux disease)     Gout, arthritis     Heart failure     Hx of psychiatric care     Hyperlipidemia     Hypertension     Hypothyroidism     Immune deficiency disorder     Kidney disease     Lung nodule 2014    RML--stable    Obesity     Pacemaker     Metronic    Paroxysmal atrial fibrillation 3/15/2018    Pneumonia     Polyneuropathy     chemo induced    Psychiatric problem     Stroke 11/16/2020    Tobacco dependence     quit 1976    Trouble in sleeping     Unstable angina 11/27/2020       Past Surgical History:   Procedure Laterality Date    APPENDECTOMY  1966 approx    CARDIAC PACEMAKER PLACEMENT  01/22/2015    CHOLECYSTECTOMY  1993    incidental at time of gastric bypass    COLON SURGERY Right 2017    hemicolectomy    COLONOSCOPY N/A 4/6/2017    Procedure: COLONOSCOPY;  Surgeon: Tye Enamorado MD;  Location: [facility];  Service: Endoscopy;  Laterality: N/A;    COLONOSCOPY N/A 11/28/2018    Procedure: COLONOSCOPY;  Surgeon: Saúl Arthur III, MD;  Location: [facility];  Service: Endoscopy;  Laterality: N/A;    CORONARY ANGIOPLASTY  02/2014    CORONARY STENT PLACEMENT  02/05/2014    ESOPHAGOGASTRODUODENOSCOPY N/A 11/28/2018    Procedure: EGD (ESOPHAGOGASTRODUODENOSCOPY);  Surgeon: Saúl Arthur III, MD;  Location: [facility];  Service: Endoscopy;  Laterality: N/A;    GASTRIC BYPASS  1993    with incidental choly    HERNIA REPAIR      INJECTION OF ANESTHETIC AGENT INTO SACROILIAC JOINT Right 10/8/2020    Procedure: Right BLOCK, SACROILIAC JOINT with RN IV sedation;  Surgeon: Madi Anton MD;  Location: [facility];  Service: Pain Management;  Laterality: Right;    JOINT REPLACEMENT Bilateral 2009    3 months  apart    TONSILLECTOMY  1959       Review of patient's allergies indicates:   Allergen Reactions    Corticosteroids (glucocorticoids) Nausea Only and Other (See Comments)     Stomach pain, dizziness, headache    Oxycodone Other (See Comments)     Blood pressure dropped       Current Facility-Administered Medications on File Prior to Encounter   Medication    denosumab (PROLIA) injection 60 mg     Current Outpatient Medications on File Prior to Encounter   Medication Sig    ascorbic acid, vitamin C, (VITAMIN C) 100 MG tablet Take by mouth once daily.    aspirin (ECOTRIN) 81 MG EC tablet Take 81 mg by mouth nightly.     busPIRone (BUSPAR) 7.5 MG tablet TAKE ONE TABLET BY MOUTH THREE TIMES DAILY    calcitRIOL (ROCALTROL) 0.25 MCG Cap TAKE ONE CAPSULE BY MOUTH EVERY MONDAY, WEDNESDAY AND FRIDAY    clopidogreL (PLAVIX) 75 mg tablet Take 1 tablet (75 mg total) by mouth once daily.    diclofenac sodium 1 % Gel Apply 2 g topically once daily. Apply 2 g over painful joints once or twice a day.    DULoxetine (CYMBALTA) 30 MG capsule Take 1 capsule (30 mg total) by mouth once daily.    ergocalciferol, vitamin D2, 400 unit Tab Take by mouth.    flu vac 2020 65up-bojTG12O,PF, (FLUAD QUAD 2020-21,65Y UP,,PF,) 60 mcg (15 mcg x 4)/0.5 mL Syrg Inject 0.5 mLs into the muscle.    fluocinolone (SYNALAR) 0.01 % external solution AAA of scalp twice a day prn itching.    fluticasone propionate (FLONASE) 50 mcg/actuation nasal spray 2 sprays (100 mcg total) by Each Nare route once daily.    gabapentin (NEURONTIN) 300 MG capsule TAKE ONE CAPSULE BY MOUTH THREE TIMES DAILY    HYDROcodone-acetaminophen (NORCO)  mg per tablet Take 1 tablet by mouth every 6 (six) hours as needed for Pain.    iron-vitamin C 100-250 mg, ICAR-C, 100-250 mg Tab TAKE ONE TABLET BY MOUTH EVERY DAY    levocetirizine (XYZAL) 5 MG tablet Take 1 tablet (5 mg total) by mouth every evening.    levothyroxine (SYNTHROID) 75 MCG tablet TAKE ONE  TABLET BY MOUTH EVERY DAY BEFORE BREAKFAST    meloxicam (MOBIC) 7.5 MG tablet TAKE ONE TABLET BY MOUTH EVERY DAY AS NEEDED FOR PAIN    metoprolol tartrate (LOPRESSOR) 25 MG tablet TAKE ONE TABLET BY MOUTH TWICE DAILY    mirtazapine (REMERON SOL-TAB) 15 MG disintegrating tablet DISSOLVE ONE TABLET BY MOUTH NIGHTLY    multivitamin (ONE DAILY MULTIVITAMIN) per tablet Take 1 tablet by mouth once daily.    pravastatin (PRAVACHOL) 40 MG tablet TAKE ONE TABLET BY MOUTH ONCE DAILY    ranitidine (ZANTAC) 150 MG capsule Take 150 mg by mouth nightly.     sertraline (ZOLOFT) 100 MG tablet TAKE 1 & 1/2 TABLETS BY MOUTH EVERY DAY    sodium bicarbonate 650 MG tablet TAKE ONE TABLET BY MOUTH TWICE DAILY    sodium zirconium cyclosilicate (LOKELMA) 5 gram packet Take 1 packet (5 g total) by mouth once daily. Mix entire contents of packet(s) into drinking glass containing >3 tablespoons of water; stir well and drink immediately. If powder remains in the drinking glass, add water and repeat until no powder remains to ensure entire dose is taken.    traMADoL (ULTRAM) 50 mg tablet Take 1 tablet (50 mg total) by mouth every 12 (twelve) hours as needed for Pain.    tretinoin (RETIN-A) 0.025 % cream Apply a pea-sized amount to the entire face at bedtime.  Use every third night and increase as tolerated to nightly.    triamcinolone acetonide 0.025% (KENALOG) 0.025 % cream Apply topically 2 (two) times daily. PRN itching.    VITAMIN D2 1,250 mcg (50,000 unit) capsule Take 1 capsule (50,000 Units total) by mouth every 7 days.    ZINC ACETATE ORAL Take 250 mg by mouth once daily.      Family History     Problem Relation (Age of Onset)    Asthma Daughter    COPD Daughter    Cancer Father, Sister, Brother, Son    Cataracts Mother, Brother    Drug abuse Son    Heart disease Mother    Hypertension Mother, Maternal Grandfather    Leukemia Brother    Pancreatic cancer Sister    Prostate cancer Son    Stomach cancer Father    Stroke  Maternal Grandfather        Tobacco Use    Smoking status: Former Smoker     Packs/day: 2.00     Years: 6.00     Pack years: 12.00     Quit date: 3/15/1976     Years since quittin.7    Smokeless tobacco: Never Used   Substance and Sexual Activity    Alcohol use: No     Alcohol/week: 0.0 standard drinks    Drug use: No    Sexual activity: Never     Review of Systems   Constitutional: Positive for fatigue. Negative for chills.   HENT: Negative for congestion, ear pain, facial swelling, sinus pressure and sore throat.    Eyes: Negative for pain.   Respiratory: Negative for apnea, chest tightness, shortness of breath and stridor.    Cardiovascular: Positive for chest pain. Negative for palpitations and leg swelling.   Gastrointestinal: Negative for abdominal distention, abdominal pain, diarrhea and nausea.   Endocrine: Negative for polydipsia and polyphagia.   Genitourinary: Negative for decreased urine volume, difficulty urinating, frequency and genital sores.   Musculoskeletal: Negative for arthralgias and gait problem.   Neurological: Negative for light-headedness and headaches.   Hematological: Negative for adenopathy.   Psychiatric/Behavioral: Negative for agitation, confusion and decreased concentration.     Objective:     Vital Signs (Most Recent):  Temp: 98.6 °F (37 °C) (20 1450)  Pulse: 83 (20)  Resp: 15 (20)  BP: (!) 142/80 (20)  SpO2: 96 % (20) Vital Signs (24h Range):  Temp:  [98.6 °F (37 °C)] 98.6 °F (37 °C)  Pulse:  [73-94] 83  Resp:  [11-] 15  SpO2:  [96 %-98 %] 96 %  BP: (123-185)/() 142/80        There is no height or weight on file to calculate BMI.    Physical Exam  Vitals signs and nursing note reviewed.   Constitutional:       Appearance: She is well-developed.   HENT:      Head: Normocephalic and atraumatic.   Eyes:      Pupils: Pupils are equal, round, and reactive to light.   Neck:      Musculoskeletal: Normal range of motion and  neck supple.      Thyroid: No thyromegaly.      Trachea: No tracheal deviation.   Cardiovascular:      Rate and Rhythm: Normal rate and regular rhythm.      Comments: Has pacemaker  Pulmonary:      Effort: No respiratory distress.      Breath sounds: No wheezing or rales.   Abdominal:      General: Bowel sounds are normal. There is no distension.      Palpations: Abdomen is soft.      Tenderness: There is no abdominal tenderness.   Skin:     General: Skin is warm and dry.      Coloration: Skin is not pale.   Neurological:      Mental Status: She is alert and oriented to person, place, and time.      Cranial Nerves: No cranial nerve deficit.      Coordination: Coordination normal.      Deep Tendon Reflexes: Reflexes are normal and symmetric.   Psychiatric:         Thought Content: Thought content normal.         Judgment: Judgment normal.           CRANIAL NERVES     CN III, IV, VI   Pupils are equal, round, and reactive to light.       Significant Labs:   Blood Culture: No results for input(s): LABBLOO in the last 48 hours.  BMP:   Recent Labs   Lab 11/27/20  1523         K 5.0   *   CO2 21*   BUN 20   CREATININE 1.3   CALCIUM 9.1     CBC:   Recent Labs   Lab 11/27/20  1523   WBC 9.83   HGB 11.0*   HCT 35.2*        All pertinent labs within the past 24 hours have been reviewed.    Significant Imaging: I have reviewed and interpreted all pertinent imaging results/findings within the past 24 hours.    Assessment/Plan:     * Chest pain    Will consult cardiology ,contineu NTG, serial troponin, aspirin , Echo.  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

## 2020-11-28 NOTE — HPI
80 year old woman with history of anemia , anxiety , Afib, CHF, GERD, HTN, CAD s/p PTCA, CKD stage 3 , and stroke who presents to the Emergency Department for CP which onset gradually yesterday at around 4pm. Chest pain was described as sharp, non radiating , associated with nausea ,9/10 in intensity at the initial period . The pain resolved with nitro. She had a repeat episode of chest pain this morning . She was seen in the Cardiology clinic by Dr Gil and was advised to come to the ED for further evaluation.  Prior Tx includes ASA and NTG paste with some relief.   Previous imaging test and lab test reviewed -  11/2020- ECHO-  · There is left ventricular concentric hypertrophy.  · With normal right ventricular systolic function.  · With normal systolic function. The estimated ejection fraction is 55%.  · Grade II diastolic dysfunction.  EKG showed- Normal sinus rhythm  Right axis deviation  · Nonspecific intraventricular   Troponin -  Component      Latest Ref Rng & Units 11/27/2020 11/27/2020           6:25 PM  3:23 PM   Troponin I      0.000 - 0.026 ng/mL 0.022 0.011   She will be placed on observation .

## 2020-11-28 NOTE — CONSULTS
[facility] -   Cardiology  Consult Note    Patient Name: Violet Swenson  MRN: [id number]  Admission Date: 11/27/2020  Hospital Length of Stay: 0 days  Code Status: Full Code   Attending Provider: Lonre Horvath, *   Consulting Provider: Arik Fry MD  Primary Care Physician: Marti Coronel MD  Principal Problem:Chest pain    Patient information was obtained from patient and ER records.     Inpatient consult to Cardiology  Consult performed by: Arik Fry MD  Consult ordered by: Ti Johnson MD        Subjective:     Chief Complaint:  Chest pain     HPI:   History of Present Illness: Violet Swenson is a 80 y.o. female patient with PMHx of anemia, anxiety, Afib, CHF, GERD, HTN, CAD, CKD, and stroke who presents to the Emergency Department for CP which onset gradually yesterday. Pt was seen by Dr. Gil (Cardiology) today who referred the pt to the ED for further evaluation. Symptoms are intermittent and moderate in severity. No mitigating or exacerbating factors reported. Associated sxs include N/V and SOB. Patient denies any fever, chills, CP, diarrhea, abd pain, back pain, neck pain, HA, dizziness, and all other sxs at this time. Prior Tx includes ASA and NTG paste with some relief. No further complaints or concerns at this time.   Patient with known cardiac disease and stent in [facility] and mild to moderate disease in the LAD and RCA in 2017. Now with ACS and positive troponin .  History of pacemaker and EKG is consistent with paced heart rate.    Past Medical History:   Diagnosis Date    Age-related osteoporosis without current pathological fracture 8/20/2018    Anemia     Anxiety     Arthritis     Atrial flutter     Cancer     lymphoma Large cell B    CHF (congestive heart failure)     Chronic anemia 4/26/2017    Chronic midline low back pain with right-sided sciatica 8/20/2018    Coronary artery disease     01/2015 C patent LCX. 50% stenosis  in LAD and RCA.      Depression     Disorder of kidney and ureter     Encounter for blood transfusion     GERD (gastroesophageal reflux disease)     Gout, arthritis     Heart failure     Hx of psychiatric care     Hyperlipidemia     Hypertension     Hypothyroidism     Immune deficiency disorder     Kidney disease     Lung nodule 2014    RML--stable    Obesity     Pacemaker     Metronic    Paroxysmal atrial fibrillation 3/15/2018    Pneumonia     Polyneuropathy     chemo induced    Psychiatric problem     Stroke 11/16/2020    Tobacco dependence     quit 1976    Trouble in sleeping     Unstable angina 11/27/2020       Past Surgical History:   Procedure Laterality Date    APPENDECTOMY  1966 approx    CARDIAC PACEMAKER PLACEMENT  01/22/2015    CHOLECYSTECTOMY  1993    incidental at time of gastric bypass    COLON SURGERY Right 2017    hemicolectomy    COLONOSCOPY N/A 4/6/2017    Procedure: COLONOSCOPY;  Surgeon: Tye Enamorado MD;  Location: [facility];  Service: Endoscopy;  Laterality: N/A;    COLONOSCOPY N/A 11/28/2018    Procedure: COLONOSCOPY;  Surgeon: Saúl Arthur III, MD;  Location: [facility];  Service: Endoscopy;  Laterality: N/A;    CORONARY ANGIOPLASTY  02/2014    CORONARY STENT PLACEMENT  02/05/2014    ESOPHAGOGASTRODUODENOSCOPY N/A 11/28/2018    Procedure: EGD (ESOPHAGOGASTRODUODENOSCOPY);  Surgeon: Saúl Arthur III, MD;  Location: [facility];  Service: Endoscopy;  Laterality: N/A;    GASTRIC BYPASS  1993    with incidental choly    HERNIA REPAIR      INJECTION OF ANESTHETIC AGENT INTO SACROILIAC JOINT Right 10/8/2020    Procedure: Right BLOCK, SACROILIAC JOINT with RN IV sedation;  Surgeon: Madi Anton MD;  Location: [facility];  Service: Pain Management;  Laterality: Right;    JOINT REPLACEMENT Bilateral 2009    3 months apart    TONSILLECTOMY  1959       Review of patient's allergies indicates:   Allergen Reactions    Corticosteroids (glucocorticoids) Nausea  Only and Other (See Comments)     Stomach pain, dizziness, headache    Oxycodone Other (See Comments)     Blood pressure dropped       No current facility-administered medications on file prior to encounter.      Current Outpatient Medications on File Prior to Encounter   Medication Sig    ascorbic acid, vitamin C, (VITAMIN C) 100 MG tablet Take by mouth once daily.    aspirin (ECOTRIN) 81 MG EC tablet Take 81 mg by mouth nightly.     busPIRone (BUSPAR) 7.5 MG tablet TAKE ONE TABLET BY MOUTH THREE TIMES DAILY    calcitRIOL (ROCALTROL) 0.25 MCG Cap TAKE ONE CAPSULE BY MOUTH EVERY MONDAY, WEDNESDAY AND FRIDAY    clopidogreL (PLAVIX) 75 mg tablet Take 1 tablet (75 mg total) by mouth once daily.    diclofenac sodium 1 % Gel Apply 2 g topically once daily. Apply 2 g over painful joints once or twice a day.    DULoxetine (CYMBALTA) 30 MG capsule Take 1 capsule (30 mg total) by mouth once daily.    ergocalciferol, vitamin D2, 400 unit Tab Take by mouth.    flu vac 2020 65up-[id number],PF, (FLUAD QUAD 2020-21,65Y UP,,PF,) 60 mcg (15 mcg x 4)/0.5 mL Syrg Inject 0.5 mLs into the muscle.    fluocinolone (SYNALAR) 0.01 % external solution AAA of scalp twice a day prn itching.    fluticasone propionate (FLONASE) 50 mcg/actuation nasal spray 2 sprays (100 mcg total) by Each Nare route once daily.    gabapentin (NEURONTIN) 300 MG capsule TAKE ONE CAPSULE BY MOUTH THREE TIMES DAILY    HYDROcodone-acetaminophen (NORCO)  mg per tablet Take 1 tablet by mouth every 6 (six) hours as needed for Pain.    iron-vitamin C 100-250 mg, ICAR-C, 100-250 mg Tab TAKE ONE TABLET BY MOUTH EVERY DAY    levocetirizine (XYZAL) 5 MG tablet Take 1 tablet (5 mg total) by mouth every evening.    levothyroxine (SYNTHROID) 75 MCG tablet TAKE ONE TABLET BY MOUTH EVERY DAY BEFORE BREAKFAST    meloxicam (MOBIC) 7.5 MG tablet TAKE ONE TABLET BY MOUTH EVERY DAY AS NEEDED FOR PAIN    metoprolol tartrate (LOPRESSOR) 25 MG tablet TAKE ONE  TABLET BY MOUTH TWICE DAILY    mirtazapine (REMERON SOL-TAB) 15 MG disintegrating tablet DISSOLVE ONE TABLET BY MOUTH NIGHTLY    multivitamin (ONE DAILY MULTIVITAMIN) per tablet Take 1 tablet by mouth once daily.    pravastatin (PRAVACHOL) 40 MG tablet TAKE ONE TABLET BY MOUTH ONCE DAILY    ranitidine (ZANTAC) 150 MG capsule Take 150 mg by mouth nightly.     sertraline (ZOLOFT) 100 MG tablet TAKE 1 & 1/2 TABLETS BY MOUTH EVERY DAY    sodium bicarbonate 650 MG tablet TAKE ONE TABLET BY MOUTH TWICE DAILY    sodium zirconium cyclosilicate (LOKELMA) 5 gram packet Take 1 packet (5 g total) by mouth once daily. Mix entire contents of packet(s) into drinking glass containing >3 tablespoons of water; stir well and drink immediately. If powder remains in the drinking glass, add water and repeat until no powder remains to ensure entire dose is taken.    traMADoL (ULTRAM) 50 mg tablet Take 1 tablet (50 mg total) by mouth every 12 (twelve) hours as needed for Pain.    tretinoin (RETIN-A) 0.025 % cream Apply a pea-sized amount to the entire face at bedtime.  Use every third night and increase as tolerated to nightly.    triamcinolone acetonide 0.025% (KENALOG) 0.025 % cream Apply topically 2 (two) times daily. PRN itching.    VITAMIN D2 1,250 mcg (50,000 unit) capsule Take 1 capsule (50,000 Units total) by mouth every 7 days.    ZINC ACETATE ORAL Take 250 mg by mouth once daily.      Family History     Problem Relation (Age of Onset)    Asthma Daughter    COPD Daughter    Cancer Father, Sister, Brother, Son    Cataracts Mother, Brother    Drug abuse Son    Heart disease Mother    Hypertension Mother, Maternal Grandfather    Leukemia Brother    Pancreatic cancer Sister    Prostate cancer Son    Stomach cancer Father    Stroke Maternal Grandfather        Tobacco Use    Smoking status: Former Smoker     Packs/day: 2.00     Years: 6.00     Pack years: 12.00     Quit date: 3/15/1976     Years since quittin.7     Smokeless tobacco: Never Used   Substance and Sexual Activity    Alcohol use: No     Alcohol/week: 0.0 standard drinks    Drug use: No    Sexual activity: Never     Review of Systems   Constitution: Positive for malaise/fatigue. Negative for chills, diaphoresis, night sweats, weight gain and weight loss.   HENT: Negative for congestion, hoarse voice, sore throat and stridor.    Eyes: Negative for double vision and pain.   Cardiovascular: Positive for chest pain. Negative for claudication, cyanosis, dyspnea on exertion, irregular heartbeat, leg swelling, near-syncope, orthopnea, palpitations, paroxysmal nocturnal dyspnea and syncope.   Respiratory: Negative for cough, hemoptysis, shortness of breath, sleep disturbances due to breathing, snoring, sputum production and wheezing.    Endocrine: Negative for cold intolerance, heat intolerance and polydipsia.   Hematologic/Lymphatic: Negative for bleeding problem. Does not bruise/bleed easily.   Skin: Negative for color change, dry skin and rash.   Musculoskeletal: Negative for joint swelling and muscle cramps.   Gastrointestinal: Negative for bloating, abdominal pain, constipation, diarrhea, dysphagia, melena, nausea and vomiting.   Genitourinary: Negative for flank pain and urgency.   Neurological: Negative for dizziness, focal weakness, headaches, light-headedness, loss of balance, seizures and weakness.   Psychiatric/Behavioral: Negative for altered mental status and memory loss. The patient is not nervous/anxious.      Objective:     Vital Signs (Most Recent):  Temp: 98.2 °F (36.8 °C) (11/28/20 1120)  Pulse: 68 (11/28/20 1120)  Resp: 18 (11/28/20 1120)  BP: 128/64 (11/28/20 1120)  SpO2: 97 % (11/28/20 1120) Vital Signs (24h Range):  Temp:  [97.7 °F (36.5 °C)-98.7 °F (37.1 °C)] 98.2 °F (36.8 °C)  Pulse:  [68-94] 68  Resp:  [11-20] 18  SpO2:  [96 %-98 %] 97 %  BP: (123-185)/() 128/64     Weight: 60 kg (132 lb 4.4 oz)  Body mass index is 22.71 kg/m².    SpO2: 97  %  O2 Device (Oxygen Therapy): room air      Intake/Output Summary (Last 24 hours) at 11/28/2020 1205  Last data filed at 11/28/2020 0600  Gross per 24 hour   Intake 200 ml   Output --   Net 200 ml       Lines/Drains/Airways     Central Venous Catheter Line                 Port A Cath Single Lumen right subclavian -- days          Peripheral Intravenous Line                 Peripheral IV - Single Lumen 11/27/20 1523 20 G Right Antecubital less than 1 day                Physical Exam   Constitutional: She is oriented to person, place, and time. She appears well-developed and well-nourished.   Eyes: Pupils are equal, round, and reactive to light.   Neck: Normal range of motion. No tracheal deviation present.   Cardiovascular: Normal rate, regular rhythm, normal heart sounds and intact distal pulses. Exam reveals no gallop and no friction rub.   No murmur heard.  Pulses:       Carotid pulses are 2+ on the right side and 2+ on the left side.       Radial pulses are 2+ on the right side and 2+ on the left side.        Femoral pulses are 2+ on the right side and 2+ on the left side.       Popliteal pulses are 2+ on the right side and 2+ on the left side.        Dorsalis pedis pulses are 2+ on the right side and 2+ on the left side.        Posterior tibial pulses are 2+ on the right side and 2+ on the left side.   Pulmonary/Chest: Effort normal and breath sounds normal. No stridor. No respiratory distress. She has no wheezes. She has no rales. She exhibits no tenderness.   Abdominal: She exhibits no distension. There is no abdominal tenderness. There is no rebound.   Musculoskeletal:         General: No tenderness or edema.   Neurological: She is alert and oriented to person, place, and time.   Skin: Skin is warm and dry.       Significant Labs:   Troponin   Recent Labs   Lab 11/27/20  1825 11/28/20  0000 11/28/20  0419   TROPONINI 0.022 0.027* 0.016       Significant Imaging: Echocardiogram:   Transthoracic echo (TTE)  complete (Cupid Only):   Results for orders placed or performed during the hospital encounter of 11/16/20   Echo Color Flow Doppler? Yes   Result Value Ref Range    BSA 1.72 m2    TDI SEPTAL 0.05 m/s    LV LATERAL E/E' RATIO 10.55 m/s    LV SEPTAL E/E' RATIO 23.20 m/s    TDI LATERAL 0.11 m/s    LVIDd 4.69 3.5 - 6.0 cm    IVS 1.62 (A) 0.6 - 1.1 cm    Posterior Wall 1.27 (A) 0.6 - 1.1 cm    Ao root annulus 3.26 cm    LVIDs 3.34 2.1 - 4.0 cm    FS 29 28 - 44 %    Sinus 3.41 cm    STJ 3.34 cm    Ascending aorta 3.13 cm    LV mass 277.12 g    LA size 4.59 cm    TAPSE 1.99 cm    Left Ventricle Relative Wall Thickness 0.54 cm    AV mean gradient 7 mmHg    AV valve area 1.63 cm2    AV Velocity Ratio 0.43     AV index (prosthetic) 0.53     MV valve area p 1/2 method 4.06 cm2    E/A ratio 1.23     Mean e' 0.08 m/s    E wave decelartion time 187.02 msec    IVRT 79.92 msec    LVOT diameter 1.99 cm    LVOT area 3.1 cm2    LVOT peak jorge 0.81 m/s    LVOT peak VTI 20.58 cm    Ao peak jorge 1.89 m/s    Ao VTI 39.20 cm    RVOT peak jorge 0.94 m/s    RVOT peak VTI 19.70 cm    Mr max jorge 0.05 m/s    LVOT stroke volume 63.98 cm3    AV peak gradient 14 mmHg    PV mean gradient 1.90 mmHg    E/E' ratio 14.50 m/s    MV Peak E Jorge 1.16 m/s    TR Max Jorge 2.96 m/s    MV stenosis pressure 1/2 time 54.23 ms    MV Peak A Jorge 0.94 m/s    LV Systolic Volume 45.30 mL    LV Systolic Volume Index 26.6 mL/m2    LV Diastolic Volume 102.09 mL    LV Diastolic Volume Index 59.84 mL/m2    LV Mass Index 162 g/m2    RA Major Axis 3.66 cm    Left Atrium Minor Axis 4.52 cm    Left Atrium Major Axis 4.59 cm    Triscuspid Valve Regurgitation Peak Gradient 35 mmHg    Right Atrial Pressure (from IVC) 3 mmHg    TV rest pulmonary artery pressure 38 mmHg    Narrative    · There is left ventricular concentric hypertrophy.  · With normal right ventricular systolic function.  · With normal systolic function. The estimated ejection fraction is 55%.  · Grade II diastolic  dysfunction.  · Mild left atrial enlargement.  · Normal central venous pressure (3 mmHg).  · The estimated PA systolic pressure is 38 mmHg.        Assessment and Plan:     Coronary artery disease : multiple vessels, s/p PTCA  Plan recheck by cath Monday  Patient and family are in agreement    Pacemaker  stable    Essential hypertension  Continue current medications        VTE Risk Mitigation (From admission, onward)         Ordered     IP VTE HIGH RISK PATIENT  Once      11/27/20 2334     Place sequential compression device  Until discontinued      11/27/20 2334     Place sequential compression device  Until discontinued      11/27/20 1082                Thank you for your consult. I will follow-up with patient. Please contact us if you have any additional questions.    Arik Fry MD  Cardiology   [facility] -

## 2020-11-28 NOTE — PLAN OF CARE
POC reviewed. Comfort measures and safety measures addressed. Telemetry monitoring NSR. Pt denies chest pain at this time. Monitor labs and vitals. Will continue to monitor.  Problem: Adult Inpatient Plan of Care  Goal: Plan of Care Review  Outcome: Ongoing, Progressing  Goal: Patient-Specific Goal (Individualization)  Outcome: Ongoing, Progressing  Goal: Absence of Hospital-Acquired Illness or Injury  Outcome: Ongoing, Progressing  Goal: Optimal Comfort and Wellbeing  Outcome: Ongoing, Progressing  Goal: Readiness for Transition of Care  Outcome: Ongoing, Progressing  Goal: Rounds/Family Conference  Outcome: Ongoing, Progressing

## 2020-11-28 NOTE — ASSESSMENT & PLAN NOTE
Will consult cardiology ,contineu NTG, serial troponin, aspirin , Echo.  Case was discussed with cardiology in the Ed

## 2020-11-28 NOTE — SUBJECTIVE & OBJECTIVE
Past Medical History:   Diagnosis Date    Age-related osteoporosis without current pathological fracture 8/20/2018    Anemia     Anxiety     Arthritis     Atrial flutter     Cancer     lymphoma Large cell B    CHF (congestive heart failure)     Chronic anemia 4/26/2017    Chronic midline low back pain with right-sided sciatica 8/20/2018    Coronary artery disease     01/2015 LHC patent LCX. 50% stenosis in LAD and RCA.      Depression     Disorder of kidney and ureter     Encounter for blood transfusion     GERD (gastroesophageal reflux disease)     Gout, arthritis     Heart failure     Hx of psychiatric care     Hyperlipidemia     Hypertension     Hypothyroidism     Immune deficiency disorder     Kidney disease     Lung nodule 2014    RML--stable    Obesity     Pacemaker     Metronic    Paroxysmal atrial fibrillation 3/15/2018    Pneumonia     Polyneuropathy     chemo induced    Psychiatric problem     Stroke 11/16/2020    Tobacco dependence     quit 1976    Trouble in sleeping     Unstable angina 11/27/2020       Past Surgical History:   Procedure Laterality Date    APPENDECTOMY  1966 approx    CARDIAC PACEMAKER PLACEMENT  01/22/2015    CHOLECYSTECTOMY  1993    incidental at time of gastric bypass    COLON SURGERY Right 2017    hemicolectomy    COLONOSCOPY N/A 4/6/2017    Procedure: COLONOSCOPY;  Surgeon: Tye Enamorado MD;  Location: [facility];  Service: Endoscopy;  Laterality: N/A;    COLONOSCOPY N/A 11/28/2018    Procedure: COLONOSCOPY;  Surgeon: Saúl Arthur III, MD;  Location: [facility];  Service: Endoscopy;  Laterality: N/A;    CORONARY ANGIOPLASTY  02/2014    CORONARY STENT PLACEMENT  02/05/2014    ESOPHAGOGASTRODUODENOSCOPY N/A 11/28/2018    Procedure: EGD (ESOPHAGOGASTRODUODENOSCOPY);  Surgeon: Saúl Arthur III, MD;  Location: [facility];  Service: Endoscopy;  Laterality: N/A;    GASTRIC BYPASS  1993    with incidental choly    HERNIA REPAIR       INJECTION OF ANESTHETIC AGENT INTO SACROILIAC JOINT Right 10/8/2020    Procedure: Right BLOCK, SACROILIAC JOINT with RN IV sedation;  Surgeon: Madi Anton MD;  Location: [facility];  Service: Pain Management;  Laterality: Right;    JOINT REPLACEMENT Bilateral 2009    3 months apart    TONSILLECTOMY  1959       Review of patient's allergies indicates:   Allergen Reactions    Corticosteroids (glucocorticoids) Nausea Only and Other (See Comments)     Stomach pain, dizziness, headache    Oxycodone Other (See Comments)     Blood pressure dropped       No current facility-administered medications on file prior to encounter.      Current Outpatient Medications on File Prior to Encounter   Medication Sig    ascorbic acid, vitamin C, (VITAMIN C) 100 MG tablet Take by mouth once daily.    aspirin (ECOTRIN) 81 MG EC tablet Take 81 mg by mouth nightly.     busPIRone (BUSPAR) 7.5 MG tablet TAKE ONE TABLET BY MOUTH THREE TIMES DAILY    calcitRIOL (ROCALTROL) 0.25 MCG Cap TAKE ONE CAPSULE BY MOUTH EVERY MONDAY, WEDNESDAY AND FRIDAY    clopidogreL (PLAVIX) 75 mg tablet Take 1 tablet (75 mg total) by mouth once daily.    diclofenac sodium 1 % Gel Apply 2 g topically once daily. Apply 2 g over painful joints once or twice a day.    DULoxetine (CYMBALTA) 30 MG capsule Take 1 capsule (30 mg total) by mouth once daily.    ergocalciferol, vitamin D2, 400 unit Tab Take by mouth.    flu vac 2020 65up-[id number],PF, (FLUAD QUAD 2020-21,65Y UP,,PF,) 60 mcg (15 mcg x 4)/0.5 mL Syrg Inject 0.5 mLs into the muscle.    fluocinolone (SYNALAR) 0.01 % external solution AAA of scalp twice a day prn itching.    fluticasone propionate (FLONASE) 50 mcg/actuation nasal spray 2 sprays (100 mcg total) by Each Nare route once daily.    gabapentin (NEURONTIN) 300 MG capsule TAKE ONE CAPSULE BY MOUTH THREE TIMES DAILY    HYDROcodone-acetaminophen (NORCO)  mg per tablet Take 1 tablet by mouth every 6 (six) hours as needed for Pain.     iron-vitamin C 100-250 mg, ICAR-C, 100-250 mg Tab TAKE ONE TABLET BY MOUTH EVERY DAY    levocetirizine (XYZAL) 5 MG tablet Take 1 tablet (5 mg total) by mouth every evening.    levothyroxine (SYNTHROID) 75 MCG tablet TAKE ONE TABLET BY MOUTH EVERY DAY BEFORE BREAKFAST    meloxicam (MOBIC) 7.5 MG tablet TAKE ONE TABLET BY MOUTH EVERY DAY AS NEEDED FOR PAIN    metoprolol tartrate (LOPRESSOR) 25 MG tablet TAKE ONE TABLET BY MOUTH TWICE DAILY    mirtazapine (REMERON SOL-TAB) 15 MG disintegrating tablet DISSOLVE ONE TABLET BY MOUTH NIGHTLY    multivitamin (ONE DAILY MULTIVITAMIN) per tablet Take 1 tablet by mouth once daily.    pravastatin (PRAVACHOL) 40 MG tablet TAKE ONE TABLET BY MOUTH ONCE DAILY    ranitidine (ZANTAC) 150 MG capsule Take 150 mg by mouth nightly.     sertraline (ZOLOFT) 100 MG tablet TAKE 1 & 1/2 TABLETS BY MOUTH EVERY DAY    sodium bicarbonate 650 MG tablet TAKE ONE TABLET BY MOUTH TWICE DAILY    sodium zirconium cyclosilicate (LOKELMA) 5 gram packet Take 1 packet (5 g total) by mouth once daily. Mix entire contents of packet(s) into drinking glass containing >3 tablespoons of water; stir well and drink immediately. If powder remains in the drinking glass, add water and repeat until no powder remains to ensure entire dose is taken.    traMADoL (ULTRAM) 50 mg tablet Take 1 tablet (50 mg total) by mouth every 12 (twelve) hours as needed for Pain.    tretinoin (RETIN-A) 0.025 % cream Apply a pea-sized amount to the entire face at bedtime.  Use every third night and increase as tolerated to nightly.    triamcinolone acetonide 0.025% (KENALOG) 0.025 % cream Apply topically 2 (two) times daily. PRN itching.    VITAMIN D2 1,250 mcg (50,000 unit) capsule Take 1 capsule (50,000 Units total) by mouth every 7 days.    ZINC ACETATE ORAL Take 250 mg by mouth once daily.      Family History     Problem Relation (Age of Onset)    Asthma Daughter    COPD Daughter    Cancer Father, Sister,  Brother, Son    Cataracts Mother, Brother    Drug abuse Son    Heart disease Mother    Hypertension Mother, Maternal Grandfather    Leukemia Brother    Pancreatic cancer Sister    Prostate cancer Son    Stomach cancer Father    Stroke Maternal Grandfather        Tobacco Use    Smoking status: Former Smoker     Packs/day: 2.00     Years: 6.00     Pack years: 12.00     Quit date: 3/15/1976     Years since quittin.7    Smokeless tobacco: Never Used   Substance and Sexual Activity    Alcohol use: No     Alcohol/week: 0.0 standard drinks    Drug use: No    Sexual activity: Never     Review of Systems   Constitution: Positive for malaise/fatigue. Negative for chills, diaphoresis, night sweats, weight gain and weight loss.   HENT: Negative for congestion, hoarse voice, sore throat and stridor.    Eyes: Negative for double vision and pain.   Cardiovascular: Positive for chest pain. Negative for claudication, cyanosis, dyspnea on exertion, irregular heartbeat, leg swelling, near-syncope, orthopnea, palpitations, paroxysmal nocturnal dyspnea and syncope.   Respiratory: Negative for cough, hemoptysis, shortness of breath, sleep disturbances due to breathing, snoring, sputum production and wheezing.    Endocrine: Negative for cold intolerance, heat intolerance and polydipsia.   Hematologic/Lymphatic: Negative for bleeding problem. Does not bruise/bleed easily.   Skin: Negative for color change, dry skin and rash.   Musculoskeletal: Negative for joint swelling and muscle cramps.   Gastrointestinal: Negative for bloating, abdominal pain, constipation, diarrhea, dysphagia, melena, nausea and vomiting.   Genitourinary: Negative for flank pain and urgency.   Neurological: Negative for dizziness, focal weakness, headaches, light-headedness, loss of balance, seizures and weakness.   Psychiatric/Behavioral: Negative for altered mental status and memory loss. The patient is not nervous/anxious.      Objective:     Vital Signs  (Most Recent):  Temp: 98.2 °F (36.8 °C) (11/28/20 1120)  Pulse: 68 (11/28/20 1120)  Resp: 18 (11/28/20 1120)  BP: 128/64 (11/28/20 1120)  SpO2: 97 % (11/28/20 1120) Vital Signs (24h Range):  Temp:  [97.7 °F (36.5 °C)-98.7 °F (37.1 °C)] 98.2 °F (36.8 °C)  Pulse:  [68-94] 68  Resp:  [11-20] 18  SpO2:  [96 %-98 %] 97 %  BP: (123-185)/() 128/64     Weight: 60 kg (132 lb 4.4 oz)  Body mass index is 22.71 kg/m².    SpO2: 97 %  O2 Device (Oxygen Therapy): room air      Intake/Output Summary (Last 24 hours) at 11/28/2020 1205  Last data filed at 11/28/2020 0600  Gross per 24 hour   Intake 200 ml   Output --   Net 200 ml       Lines/Drains/Airways     Central Venous Catheter Line                 Port A Cath Single Lumen right subclavian -- days          Peripheral Intravenous Line                 Peripheral IV - Single Lumen 11/27/20 1523 20 G Right Antecubital less than 1 day                Physical Exam   Constitutional: She is oriented to person, place, and time. She appears well-developed and well-nourished.   Eyes: Pupils are equal, round, and reactive to light.   Neck: Normal range of motion. No tracheal deviation present.   Cardiovascular: Normal rate, regular rhythm, normal heart sounds and intact distal pulses. Exam reveals no gallop and no friction rub.   No murmur heard.  Pulses:       Carotid pulses are 2+ on the right side and 2+ on the left side.       Radial pulses are 2+ on the right side and 2+ on the left side.        Femoral pulses are 2+ on the right side and 2+ on the left side.       Popliteal pulses are 2+ on the right side and 2+ on the left side.        Dorsalis pedis pulses are 2+ on the right side and 2+ on the left side.        Posterior tibial pulses are 2+ on the right side and 2+ on the left side.   Pulmonary/Chest: Effort normal and breath sounds normal. No stridor. No respiratory distress. She has no wheezes. She has no rales. She exhibits no tenderness.   Abdominal: She exhibits no  distension. There is no abdominal tenderness. There is no rebound.   Musculoskeletal:         General: No tenderness or edema.   Neurological: She is alert and oriented to person, place, and time.   Skin: Skin is warm and dry.       Significant Labs:   Troponin   Recent Labs   Lab 11/27/20  1825 11/28/20  0000 11/28/20  0419   TROPONINI 0.022 0.027* 0.016       Significant Imaging: Echocardiogram:   Transthoracic echo (TTE) complete (Cupid Only):   Results for orders placed or performed during the hospital encounter of 11/16/20   Echo Color Flow Doppler? Yes   Result Value Ref Range    BSA 1.72 m2    TDI SEPTAL 0.05 m/s    LV LATERAL E/E' RATIO 10.55 m/s    LV SEPTAL E/E' RATIO 23.20 m/s    TDI LATERAL 0.11 m/s    LVIDd 4.69 3.5 - 6.0 cm    IVS 1.62 (A) 0.6 - 1.1 cm    Posterior Wall 1.27 (A) 0.6 - 1.1 cm    Ao root annulus 3.26 cm    LVIDs 3.34 2.1 - 4.0 cm    FS 29 28 - 44 %    Sinus 3.41 cm    STJ 3.34 cm    Ascending aorta 3.13 cm    LV mass 277.12 g    LA size 4.59 cm    TAPSE 1.99 cm    Left Ventricle Relative Wall Thickness 0.54 cm    AV mean gradient 7 mmHg    AV valve area 1.63 cm2    AV Velocity Ratio 0.43     AV index (prosthetic) 0.53     MV valve area p 1/2 method 4.06 cm2    E/A ratio 1.23     Mean e' 0.08 m/s    E wave decelartion time 187.02 msec    IVRT 79.92 msec    LVOT diameter 1.99 cm    LVOT area 3.1 cm2    LVOT peak jorge 0.81 m/s    LVOT peak VTI 20.58 cm    Ao peak jorge 1.89 m/s    Ao VTI 39.20 cm    RVOT peak jorge 0.94 m/s    RVOT peak VTI 19.70 cm    Mr max jorge 0.05 m/s    LVOT stroke volume 63.98 cm3    AV peak gradient 14 mmHg    PV mean gradient 1.90 mmHg    E/E' ratio 14.50 m/s    MV Peak E Jorge 1.16 m/s    TR Max Jorge 2.96 m/s    MV stenosis pressure 1/2 time 54.23 ms    MV Peak A Jorge 0.94 m/s    LV Systolic Volume 45.30 mL    LV Systolic Volume Index 26.6 mL/m2    LV Diastolic Volume 102.09 mL    LV Diastolic Volume Index 59.84 mL/m2    LV Mass Index 162 g/m2    RA Major Axis 3.66 cm     Left Atrium Minor Axis 4.52 cm    Left Atrium Major Axis 4.59 cm    Triscuspid Valve Regurgitation Peak Gradient 35 mmHg    Right Atrial Pressure (from IVC) 3 mmHg    TV rest pulmonary artery pressure 38 mmHg    Narrative    · There is left ventricular concentric hypertrophy.  · With normal right ventricular systolic function.  · With normal systolic function. The estimated ejection fraction is 55%.  · Grade II diastolic dysfunction.  · Mild left atrial enlargement.  · Normal central venous pressure (3 mmHg).  · The estimated PA systolic pressure is 38 mmHg.

## 2020-11-28 NOTE — PROGRESS NOTES
[facility] Hospital Medicine  Progress Note    Patient Name: Violet Swenson  MRN: [id number]  Patient Class: OP- Observation   Admission Date: 11/27/2020  Length of Stay: 0 days  Attending Physician: Lorne Horvath, *  Primary Care Provider: Marti Coronel MD        Subjective:     Principal Problem:Unstable angina        HPI:   80 year old woman with history of anemia , anxiety , Afib, CHF, GERD, HTN, CAD s/p PTCA, CKD stage 3 , and stroke who presents to the Emergency Department for CP which onset gradually yesterday at around 4pm. Chest pain was described as sharp, non radiating , associated with nausea ,9/10 in intensity at the initial period . The pain resolved with nitro. She had a repeat episode of chest pain this morning . She was seen in the Cardiology clinic by Dr Gil and was advised to come to the ED for further evaluation.  Prior Tx includes ASA and NTG paste with some relief.   Previous imaging test and lab test reviewed -  11/2020- ECHO-  · There is left ventricular concentric hypertrophy.  · With normal right ventricular systolic function.  · With normal systolic function. The estimated ejection fraction is 55%.  · Grade II diastolic dysfunction.  EKG showed- Normal sinus rhythm  Right axis deviation  · Nonspecific intraventricular   Troponin -  Component      Latest Ref Rng & Units 11/27/2020 11/27/2020           6:25 PM  3:23 PM   Troponin I      0.000 - 0.026 ng/mL 0.022 0.011   She will be placed on observation .    Overview/Hospital Course:  81 y/o wf admitted with a dx of unstable angina , uncontrolled HTN and CAD . Cardiology was consulted and recs LHC  This Monday , The chest pain has resolved . The cardiac enzymes are negative x 3 . The CXR  diod not show any acute finding . The BP  better control now . She is on BB , ASA ,plavix and statin .    Interval History:     Review of Systems   Constitutional: Positive for fatigue. Negative for chills.   HENT:  Negative for congestion, ear pain, facial swelling, sinus pressure and sore throat.    Eyes: Negative for pain.   Respiratory: Negative for apnea, chest tightness, shortness of breath and stridor.    Cardiovascular: Negative for chest pain, palpitations and leg swelling.   Gastrointestinal: Negative for abdominal distention, abdominal pain, diarrhea and nausea.   Endocrine: Negative for polydipsia and polyphagia.   Genitourinary: Negative for decreased urine volume, difficulty urinating, frequency and genital sores.   Musculoskeletal: Negative for arthralgias and gait problem.   Neurological: Negative for light-headedness and headaches.   Hematological: Negative for adenopathy.   Psychiatric/Behavioral: Negative for agitation, confusion and decreased concentration.     Objective:     Vital Signs (Most Recent):  Temp: 98.2 °F (36.8 °C) (11/28/20 1120)  Pulse: 68 (11/28/20 1120)  Resp: 18 (11/28/20 1120)  BP: 128/64 (11/28/20 1120)  SpO2: 97 % (11/28/20 1120) Vital Signs (24h Range):  Temp:  [97.7 °F (36.5 °C)-98.7 °F (37.1 °C)] 98.2 °F (36.8 °C)  Pulse:  [68-94] 68  Resp:  [11-20] 18  SpO2:  [96 %-98 %] 97 %  BP: (123-185)/() 128/64     Weight: 60 kg (132 lb 4.4 oz)  Body mass index is 22.71 kg/m².    Intake/Output Summary (Last 24 hours) at 11/28/2020 1308  Last data filed at 11/28/2020 0600  Gross per 24 hour   Intake 200 ml   Output --   Net 200 ml      Physical Exam  Vitals signs and nursing note reviewed.   Constitutional:       Appearance: She is well-developed.   HENT:      Head: Normocephalic and atraumatic.   Eyes:      Pupils: Pupils are equal, round, and reactive to light.   Neck:      Musculoskeletal: Normal range of motion and neck supple.      Thyroid: No thyromegaly.      Trachea: No tracheal deviation.   Cardiovascular:      Rate and Rhythm: Normal rate and regular rhythm.      Comments: Has pacemaker  Pulmonary:      Effort: No respiratory distress.      Breath sounds: No wheezing or rales.    Abdominal:      General: Bowel sounds are normal. There is no distension.      Palpations: Abdomen is soft.      Tenderness: There is no abdominal tenderness.   Skin:     General: Skin is warm and dry.      Coloration: Skin is not pale.   Neurological:      Mental Status: She is alert and oriented to person, place, and time.      Cranial Nerves: No cranial nerve deficit.      Coordination: Coordination normal.      Deep Tendon Reflexes: Reflexes are normal and symmetric.   Psychiatric:         Thought Content: Thought content normal.         Judgment: Judgment normal.         Significant Labs: All pertinent labs within the past 24 hours have been reviewed.    Significant Imaging: I have reviewed all pertinent imaging results/findings within the past 24 hours.      Assessment/Plan:      * Unstable angina  Cont statin ,asa , plavix  And  bb   Troponin  Negative  Cardiology consulted. 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    Oncology follow up     Coronary artery disease : multiple vessels, s/p PTCA  continue aspirin/plavix/statin and BB  Plan for a C per cardiology       Pacemaker  Tele   Cont current tx     Major depression, chronic    Continue Remeron ,zoloft    Hypothyroidism (acquired)  Cont synthroid    Essential hypertension  continue metoprolol   Keep SBP < 150/90       VTE Risk Mitigation (From admission, onward)         Ordered     IP VTE HIGH RISK PATIENT  Once      11/27/20 2334     Place sequential compression device  Until discontinued      11/27/20 2334     Place sequential compression device  Until discontinued      11/27/20 2215                Discharge Planning   LADARIUS:      Code Status: Full Code   Is the patient medically ready for discharge?:     Reason for patient still in hospital (select all that apply): Treatment                     Lorne Horvath  MD  Department of Hospital Medicine   [facility]

## 2020-11-28 NOTE — ASSESSMENT & PLAN NOTE
Cont statin ,asa , plavix  And  bb   Troponin  Negative  Cardiology consulted. Plan for a [facility] Monday

## 2020-11-28 NOTE — PLAN OF CARE
POC reviewed, verbalized understanding. Pt remained free from falls, fall precautions in place. Pt is  SR 1 AVB on monitor, 8644. VSS. No other c/o at this time. PIV intact. RA. Call bell and personal belongings within reach. Hourly rounding complete. Reminded to call for assistance. Will continue to monitor.

## 2020-11-29 LAB
BASOPHILS # BLD AUTO: 0.07 K/UL (ref 0–0.2)
BASOPHILS NFR BLD: 0.6 % (ref 0–1.9)
DIFFERENTIAL METHOD: ABNORMAL
EOSINOPHIL # BLD AUTO: 0.3 K/UL (ref 0–0.5)
EOSINOPHIL NFR BLD: 3.1 % (ref 0–8)
ERYTHROCYTE [DISTWIDTH] IN BLOOD BY AUTOMATED COUNT: 17.4 % (ref 11.5–14.5)
HCT VFR BLD AUTO: 39.1 % (ref 37–48.5)
HGB BLD-MCNC: 11.9 G/DL (ref 12–16)
IMM GRANULOCYTES # BLD AUTO: 0.07 K/UL (ref 0–0.04)
IMM GRANULOCYTES NFR BLD AUTO: 0.6 % (ref 0–0.5)
LYMPHOCYTES # BLD AUTO: 6.3 K/UL (ref 1–4.8)
LYMPHOCYTES NFR BLD: 58.6 % (ref 18–48)
MCH RBC QN AUTO: 32.7 PG (ref 27–31)
MCHC RBC AUTO-ENTMCNC: 30.4 G/DL (ref 32–36)
MCV RBC AUTO: 107 FL (ref 82–98)
MONOCYTES # BLD AUTO: 0.8 K/UL (ref 0.3–1)
MONOCYTES NFR BLD: 7.7 % (ref 4–15)
NEUTROPHILS # BLD AUTO: 3.2 K/UL (ref 1.8–7.7)
NEUTROPHILS NFR BLD: 29.4 % (ref 38–73)
NRBC BLD-RTO: 0 /100 WBC
PLATELET # BLD AUTO: 268 K/UL (ref 150–350)
PMV BLD AUTO: 11.1 FL (ref 9.2–12.9)
RBC # BLD AUTO: 3.64 M/UL (ref 4–5.4)
TROPONIN I SERPL DL<=0.01 NG/ML-MCNC: 0.02 NG/ML (ref 0–0.03)
WBC # BLD AUTO: 10.77 K/UL (ref 3.9–12.7)

## 2020-11-29 PROCEDURE — 96375 TX/PRO/DX INJ NEW DRUG ADDON: CPT

## 2020-11-29 PROCEDURE — 36415 COLL VENOUS BLD VENIPUNCTURE: CPT | Mod: HCNC

## 2020-11-29 PROCEDURE — 84484 ASSAY OF TROPONIN QUANT: CPT | Mod: HCNC

## 2020-11-29 PROCEDURE — G0378 HOSPITAL OBSERVATION PER HR: HCPCS | Mod: HCNC

## 2020-11-29 PROCEDURE — 85025 COMPLETE CBC W/AUTO DIFF WBC: CPT | Mod: HCNC

## 2020-11-29 PROCEDURE — 25000003 PHARM REV CODE 250: Mod: HCNC | Performed by: INTERNAL MEDICINE

## 2020-11-29 PROCEDURE — 63600175 PHARM REV CODE 636 W HCPCS: Mod: HCNC | Performed by: INTERNAL MEDICINE

## 2020-11-29 PROCEDURE — 99225 PR SUBSEQUENT OBSERVATION CARE,LEVEL II: ICD-10-PCS | Mod: 25,HCNC,, | Performed by: INTERNAL MEDICINE

## 2020-11-29 PROCEDURE — 99225 PR SUBSEQUENT OBSERVATION CARE,LEVEL II: CPT | Mod: 25,HCNC,, | Performed by: INTERNAL MEDICINE

## 2020-11-29 RX ORDER — ONDANSETRON 2 MG/ML
4 INJECTION INTRAMUSCULAR; INTRAVENOUS EVERY 6 HOURS PRN
Status: DISCONTINUED | OUTPATIENT
Start: 2020-11-29 | End: 2020-11-30 | Stop reason: HOSPADM

## 2020-11-29 RX ADMIN — BUSPIRONE HYDROCHLORIDE 7.5 MG: 5 TABLET ORAL at 02:11

## 2020-11-29 RX ADMIN — GABAPENTIN 300 MG: 300 CAPSULE ORAL at 08:11

## 2020-11-29 RX ADMIN — PRAVASTATIN SODIUM 40 MG: 20 TABLET ORAL at 08:11

## 2020-11-29 RX ADMIN — SODIUM BICARBONATE 650 MG: 650 TABLET ORAL at 08:11

## 2020-11-29 RX ADMIN — BUSPIRONE HYDROCHLORIDE 7.5 MG: 5 TABLET ORAL at 08:11

## 2020-11-29 RX ADMIN — ONDANSETRON 4 MG: 2 INJECTION INTRAMUSCULAR; INTRAVENOUS at 12:11

## 2020-11-29 RX ADMIN — CLOPIDOGREL 75 MG: 75 TABLET, FILM COATED ORAL at 08:11

## 2020-11-29 RX ADMIN — MIRTAZAPINE 15 MG: 15 TABLET, FILM COATED ORAL at 08:11

## 2020-11-29 RX ADMIN — LEVOTHYROXINE SODIUM 75 MCG: 75 TABLET ORAL at 05:11

## 2020-11-29 RX ADMIN — METOPROLOL TARTRATE 25 MG: 25 TABLET, FILM COATED ORAL at 08:11

## 2020-11-29 RX ADMIN — GABAPENTIN 300 MG: 300 CAPSULE ORAL at 02:11

## 2020-11-29 RX ADMIN — SERTRALINE HYDROCHLORIDE 150 MG: 50 TABLET ORAL at 08:11

## 2020-11-29 RX ADMIN — DULOXETINE HYDROCHLORIDE 30 MG: 30 CAPSULE, DELAYED RELEASE ORAL at 08:11

## 2020-11-29 RX ADMIN — ASPIRIN 81 MG: 81 TABLET, COATED ORAL at 08:11

## 2020-11-29 NOTE — PROGRESS NOTES
[facility] Hospital Medicine  Progress Note    Patient Name: Violet Swenson  MRN: [id number]  Patient Class: OP- Observation   Admission Date: 11/27/2020  Length of Stay: 0 days  Attending Physician: Lorne Horvath, *  Primary Care Provider: Marti Coronel MD        Subjective:     Principal Problem:Unstable angina        HPI:   80 year old woman with history of anemia , anxiety , Afib, CHF, GERD, HTN, CAD s/p PTCA, CKD stage 3 , and stroke who presents to the Emergency Department for CP which onset gradually yesterday at around 4pm. Chest pain was described as sharp, non radiating , associated with nausea ,9/10 in intensity at the initial period . The pain resolved with nitro. She had a repeat episode of chest pain this morning . She was seen in the Cardiology clinic by Dr Gil and was advised to come to the ED for further evaluation.  Prior Tx includes ASA and NTG paste with some relief.   Previous imaging test and lab test reviewed -  11/2020- ECHO-  · There is left ventricular concentric hypertrophy.  · With normal right ventricular systolic function.  · With normal systolic function. The estimated ejection fraction is 55%.  · Grade II diastolic dysfunction.  EKG showed- Normal sinus rhythm  Right axis deviation  · Nonspecific intraventricular   Troponin -  Component      Latest Ref Rng & Units 11/27/2020 11/27/2020           6:25 PM  3:23 PM   Troponin I      0.000 - 0.026 ng/mL 0.022 0.011   She will be placed on observation .    Overview/Hospital Course:  81 y/o wf admitted with a dx of unstable angina , uncontrolled HTN and CAD . Cardiology was consulted and recs LHC  This Monday , The chest pain has resolved . The cardiac enzymes are negative x 3 . The CXR  diod not show any acute finding . The BP  better control now . She is on BB , ASA ,plavix and statin .  11/30 She denies any complaint except for HA . There was no acute event overnight. She is schedule  For a [facility]  tomorrow  .     Interval History:     Review of Systems   Constitutional: Positive for fatigue. Negative for chills.   HENT: Negative for congestion, ear pain, facial swelling, sinus pressure and sore throat.    Eyes: Negative for pain.   Respiratory: Negative for apnea, chest tightness, shortness of breath and stridor.    Cardiovascular: Negative for chest pain, palpitations and leg swelling.   Gastrointestinal: Negative for abdominal distention, abdominal pain, diarrhea and nausea.   Endocrine: Negative for polydipsia and polyphagia.   Genitourinary: Negative for decreased urine volume, difficulty urinating, frequency and genital sores.   Musculoskeletal: Negative for arthralgias and gait problem.   Neurological: Negative for light-headedness and headaches.   Hematological: Negative for adenopathy.   Psychiatric/Behavioral: Negative for agitation, confusion and decreased concentration.     Objective:     Vital Signs (Most Recent):  Temp: 97.4 °F (36.3 °C) (11/29/20 1107)  Pulse: 67 (11/29/20 1107)  Resp: 18 (11/29/20 1107)  BP: 118/68 (11/29/20 1107)  SpO2: 96 % (11/29/20 1107) Vital Signs (24h Range):  Temp:  [97.4 °F (36.3 °C)-98.6 °F (37 °C)] 97.4 °F (36.3 °C)  Pulse:  [65-87] 67  Resp:  [18-20] 18  SpO2:  [95 %-97 %] 96 %  BP: (100-175)/(61-86) 118/68     Weight: 62.9 kg (138 lb 10.7 oz)  Body mass index is 23.8 kg/m².    Intake/Output Summary (Last 24 hours) at 11/29/2020 1345  Last data filed at 11/28/2020 1700  Gross per 24 hour   Intake 300 ml   Output --   Net 300 ml      Physical Exam  Vitals signs and nursing note reviewed.   Constitutional:       Appearance: She is well-developed.   HENT:      Head: Normocephalic and atraumatic.   Eyes:      Pupils: Pupils are equal, round, and reactive to light.   Neck:      Musculoskeletal: Normal range of motion and neck supple.      Thyroid: No thyromegaly.      Trachea: No tracheal deviation.   Cardiovascular:      Rate and Rhythm: Normal rate and regular rhythm.       Comments: Has pacemaker  Pulmonary:      Effort: No respiratory distress.      Breath sounds: No wheezing or rales.   Abdominal:      General: Bowel sounds are normal. There is no distension.      Palpations: Abdomen is soft.      Tenderness: There is no abdominal tenderness.   Skin:     General: Skin is warm and dry.      Coloration: Skin is not pale.   Neurological:      Mental Status: She is alert and oriented to person, place, and time.      Cranial Nerves: No cranial nerve deficit.      Coordination: Coordination normal.      Deep Tendon Reflexes: Reflexes are normal and symmetric.   Psychiatric:         Thought Content: Thought content normal.         Judgment: Judgment normal.         Significant Labs: All pertinent labs within the past 24 hours have been reviewed.    Significant Imaging: I have reviewed all pertinent imaging results/findings within the past 24 hours.      Assessment/Plan:      * Unstable angina  Cont statin ,asa , plavix  And  bb   Troponin  Negative  Cardiology consulted. Plan for a 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 tomorrow  per cardiology       Pacemaker  Tele   Cont current tx     Major depression, chronic    Continue Remeron ,zoloft    Hypothyroidism (acquired)  Cont synthroid    Essential hypertension  continue metoprolol   Keep SBP < 150/90       VTE Risk Mitigation (From admission, onward)         Ordered     IP VTE HIGH RISK PATIENT  Once      11/27/20 2334     Place sequential compression device  Until discontinued      11/27/20 2334     Place sequential compression device  Until discontinued      11/27/20 2215                Discharge Planning   LADARIUS:      Code Status: Full Code   Is the patient medically ready for  discharge?:     Reason for patient still in hospital (select all that apply): Treatment                     Lorne Horvath MD  Department of Hospital Medicine   [facility]

## 2020-11-29 NOTE — PROGRESS NOTES
[facility]   Cardiology  Progress Note    Patient Name: Violet Swenson  MRN: [id number]  Admission Date: 11/27/2020  Hospital Length of Stay: 0 days  Code Status: Full Code   Attending Physician: Lorne Horvath, *   Primary Care Physician: Marti Coronel MD  Expected Discharge Date:   Principal Problem:Unstable angina    Subjective:     Hospital Course:   11/29/20 Patient seen and examined in room today. No complaints.  Troponin elevation trending down. Paced heart rate.  Plan cardiac cath tomorrow. Patient and family are in agreement.    Interval History: stable today , plan cath tomorrow    Review of Systems   Constitution: Negative for chills, diaphoresis, night sweats, weight gain and weight loss.   HENT: Negative for congestion, hoarse voice, sore throat and stridor.    Eyes: Negative for double vision and pain.   Cardiovascular: Negative for chest pain, claudication, cyanosis, dyspnea on exertion, irregular heartbeat, leg swelling, near-syncope, orthopnea, palpitations, paroxysmal nocturnal dyspnea and syncope.   Respiratory: Negative for cough, hemoptysis, shortness of breath, sleep disturbances due to breathing, snoring, sputum production and wheezing.    Endocrine: Negative for cold intolerance, heat intolerance and polydipsia.   Hematologic/Lymphatic: Negative for bleeding problem. Does not bruise/bleed easily.   Skin: Negative for color change, dry skin and rash.   Musculoskeletal: Negative for joint swelling and muscle cramps.   Gastrointestinal: Negative for bloating, abdominal pain, constipation, diarrhea, dysphagia, melena, nausea and vomiting.   Genitourinary: Negative for flank pain and urgency.   Neurological: Negative for dizziness, focal weakness, headaches, light-headedness, loss of balance, seizures and weakness.   Psychiatric/Behavioral: Negative for altered mental status and memory loss. The patient is not nervous/anxious.      Objective:     Vital Signs  (Most Recent):  Temp: 97.4 °F (36.3 °C) (11/29/20 1107)  Pulse: 67 (11/29/20 1107)  Resp: 18 (11/29/20 1107)  BP: 118/68 (11/29/20 1107)  SpO2: 96 % (11/29/20 1107) Vital Signs (24h Range):  Temp:  [97.4 °F (36.3 °C)-98.6 °F (37 °C)] 97.4 °F (36.3 °C)  Pulse:  [65-87] 67  Resp:  [18-20] 18  SpO2:  [95 %-97 %] 96 %  BP: (100-175)/(61-86) 118/68     Weight: 62.9 kg (138 lb 10.7 oz)  Body mass index is 23.8 kg/m².     SpO2: 96 %  O2 Device (Oxygen Therapy): room air      Intake/Output Summary (Last 24 hours) at 11/29/2020 1248  Last data filed at 11/28/2020 1700  Gross per 24 hour   Intake 300 ml   Output --   Net 300 ml       Lines/Drains/Airways     Central Venous Catheter Line                 Port A Cath Single Lumen right subclavian -- days          Peripheral Intravenous Line                 Peripheral IV - Single Lumen 11/27/20 1523 20 G Right Antecubital 1 day                Physical Exam   Constitutional: She is oriented to person, place, and time.   Eyes: Pupils are equal, round, and reactive to light.   Neck: Normal range of motion. No tracheal deviation present.   Cardiovascular: Normal rate, regular rhythm, normal heart sounds and intact distal pulses. Exam reveals no gallop and no friction rub.   No murmur heard.  Pulses:       Carotid pulses are 2+ on the right side and 2+ on the left side.       Radial pulses are 2+ on the right side and 2+ on the left side.        Femoral pulses are 2+ on the right side and 2+ on the left side.       Popliteal pulses are 2+ on the right side and 2+ on the left side.        Dorsalis pedis pulses are 2+ on the right side and 2+ on the left side.        Posterior tibial pulses are 2+ on the right side and 2+ on the left side.   Pulmonary/Chest: Effort normal and breath sounds normal. No stridor. No respiratory distress. She has no wheezes. She has no rales. She exhibits no tenderness.   Abdominal: She exhibits no distension. There is no abdominal tenderness. There is no  rebound.   Musculoskeletal:         General: No tenderness or edema.   Neurological: She is alert and oriented to person, place, and time.   Skin: Skin is warm and dry.       Significant Labs:   CMP   Recent Labs   Lab 11/27/20  1523      K 5.0   *   CO2 21*      BUN 20   CREATININE 1.3   CALCIUM 9.1   PROT 7.1   ALBUMIN 3.3*   BILITOT 0.3   ALKPHOS 78   AST 32   ALT 30   ANIONGAP 8   ESTGFRAFRICA 45*   EGFRNONAA 39*       Significant Imaging: Echocardiogram:   Transthoracic echo (TTE) complete (Cupid Only):   Results for orders placed or performed during the hospital encounter of 11/16/20   Echo Color Flow Doppler? Yes   Result Value Ref Range    BSA 1.72 m2    TDI SEPTAL 0.05 m/s    LV LATERAL E/E' RATIO 10.55 m/s    LV SEPTAL E/E' RATIO 23.20 m/s    TDI LATERAL 0.11 m/s    LVIDd 4.69 3.5 - 6.0 cm    IVS 1.62 (A) 0.6 - 1.1 cm    Posterior Wall 1.27 (A) 0.6 - 1.1 cm    Ao root annulus 3.26 cm    LVIDs 3.34 2.1 - 4.0 cm    FS 29 28 - 44 %    Sinus 3.41 cm    STJ 3.34 cm    Ascending aorta 3.13 cm    LV mass 277.12 g    LA size 4.59 cm    TAPSE 1.99 cm    Left Ventricle Relative Wall Thickness 0.54 cm    AV mean gradient 7 mmHg    AV valve area 1.63 cm2    AV Velocity Ratio 0.43     AV index (prosthetic) 0.53     MV valve area p 1/2 method 4.06 cm2    E/A ratio 1.23     Mean e' 0.08 m/s    E wave decelartion time 187.02 msec    IVRT 79.92 msec    LVOT diameter 1.99 cm    LVOT area 3.1 cm2    LVOT peak jorge 0.81 m/s    LVOT peak VTI 20.58 cm    Ao peak jorge 1.89 m/s    Ao VTI 39.20 cm    RVOT peak jorge 0.94 m/s    RVOT peak VTI 19.70 cm    Mr max jorge 0.05 m/s    LVOT stroke volume 63.98 cm3    AV peak gradient 14 mmHg    PV mean gradient 1.90 mmHg    E/E' ratio 14.50 m/s    MV Peak E Jorge 1.16 m/s    TR Max Jorge 2.96 m/s    MV stenosis pressure 1/2 time 54.23 ms    MV Peak A Jorge 0.94 m/s    LV Systolic Volume 45.30 mL    LV Systolic Volume Index 26.6 mL/m2    LV Diastolic Volume 102.09 mL    LV Diastolic  Volume Index 59.84 mL/m2    LV Mass Index 162 g/m2    RA Major Axis 3.66 cm    Left Atrium Minor Axis 4.52 cm    Left Atrium Major Axis 4.59 cm    Triscuspid Valve Regurgitation Peak Gradient 35 mmHg    Right Atrial Pressure (from IVC) 3 mmHg    TV rest pulmonary artery pressure 38 mmHg    Narrative    · There is left ventricular concentric hypertrophy.  · With normal right ventricular systolic function.  · With normal systolic function. The estimated ejection fraction is 55%.  · Grade II diastolic dysfunction.  · Mild left atrial enlargement.  · Normal central venous pressure (3 mmHg).  · The estimated PA systolic pressure is 38 mmHg.        Assessment and Plan:     Brief HPI: stable and plan cath Monday    Coronary artery disease : multiple vessels, s/p PTCA  Plan recheck by cath Monday  Patient and family are in agreement    Pacemaker  stable    Essential hypertension  Continue current medications        VTE Risk Mitigation (From admission, onward)         Ordered     IP VTE HIGH RISK PATIENT  Once      11/27/20 2334     Place sequential compression device  Until discontinued      11/27/20 2334     Place sequential compression device  Until discontinued      11/27/20 0166                Arik Fry MD  Cardiology  [facility]

## 2020-11-29 NOTE — SUBJECTIVE & OBJECTIVE
Interval History:     Review of Systems   Constitutional: Positive for fatigue. Negative for chills.   HENT: Negative for congestion, ear pain, facial swelling, sinus pressure and sore throat.    Eyes: Negative for pain.   Respiratory: Negative for apnea, chest tightness, shortness of breath and stridor.    Cardiovascular: Negative for chest pain, palpitations and leg swelling.   Gastrointestinal: Negative for abdominal distention, abdominal pain, diarrhea and nausea.   Endocrine: Negative for polydipsia and polyphagia.   Genitourinary: Negative for decreased urine volume, difficulty urinating, frequency and genital sores.   Musculoskeletal: Negative for arthralgias and gait problem.   Neurological: Negative for light-headedness and headaches.   Hematological: Negative for adenopathy.   Psychiatric/Behavioral: Negative for agitation, confusion and decreased concentration.     Objective:     Vital Signs (Most Recent):  Temp: 97.4 °F (36.3 °C) (11/29/20 1107)  Pulse: 67 (11/29/20 1107)  Resp: 18 (11/29/20 1107)  BP: 118/68 (11/29/20 1107)  SpO2: 96 % (11/29/20 1107) Vital Signs (24h Range):  Temp:  [97.4 °F (36.3 °C)-98.6 °F (37 °C)] 97.4 °F (36.3 °C)  Pulse:  [65-87] 67  Resp:  [18-20] 18  SpO2:  [95 %-97 %] 96 %  BP: (100-175)/(61-86) 118/68     Weight: 62.9 kg (138 lb 10.7 oz)  Body mass index is 23.8 kg/m².    Intake/Output Summary (Last 24 hours) at 11/29/2020 1345  Last data filed at 11/28/2020 1700  Gross per 24 hour   Intake 300 ml   Output --   Net 300 ml      Physical Exam  Vitals signs and nursing note reviewed.   Constitutional:       Appearance: She is well-developed.   HENT:      Head: Normocephalic and atraumatic.   Eyes:      Pupils: Pupils are equal, round, and reactive to light.   Neck:      Musculoskeletal: Normal range of motion and neck supple.      Thyroid: No thyromegaly.      Trachea: No tracheal deviation.   Cardiovascular:      Rate and Rhythm: Normal rate and regular rhythm.      Comments:  Has pacemaker  Pulmonary:      Effort: No respiratory distress.      Breath sounds: No wheezing or rales.   Abdominal:      General: Bowel sounds are normal. There is no distension.      Palpations: Abdomen is soft.      Tenderness: There is no abdominal tenderness.   Skin:     General: Skin is warm and dry.      Coloration: Skin is not pale.   Neurological:      Mental Status: She is alert and oriented to person, place, and time.      Cranial Nerves: No cranial nerve deficit.      Coordination: Coordination normal.      Deep Tendon Reflexes: Reflexes are normal and symmetric.   Psychiatric:         Thought Content: Thought content normal.         Judgment: Judgment normal.         Significant Labs: All pertinent labs within the past 24 hours have been reviewed.    Significant Imaging: I have reviewed all pertinent imaging results/findings within the past 24 hours.

## 2020-11-29 NOTE — ASSESSMENT & PLAN NOTE
Cont statin ,asa , plavix  And  bb   Troponin  Negative  Cardiology consulted. Plan for a [facility] Monday

## 2020-11-29 NOTE — SUBJECTIVE & OBJECTIVE
Interval History: stable today , plan cath tomorrow    Review of Systems   Constitution: Negative for chills, diaphoresis, night sweats, weight gain and weight loss.   HENT: Negative for congestion, hoarse voice, sore throat and stridor.    Eyes: Negative for double vision and pain.   Cardiovascular: Negative for chest pain, claudication, cyanosis, dyspnea on exertion, irregular heartbeat, leg swelling, near-syncope, orthopnea, palpitations, paroxysmal nocturnal dyspnea and syncope.   Respiratory: Negative for cough, hemoptysis, shortness of breath, sleep disturbances due to breathing, snoring, sputum production and wheezing.    Endocrine: Negative for cold intolerance, heat intolerance and polydipsia.   Hematologic/Lymphatic: Negative for bleeding problem. Does not bruise/bleed easily.   Skin: Negative for color change, dry skin and rash.   Musculoskeletal: Negative for joint swelling and muscle cramps.   Gastrointestinal: Negative for bloating, abdominal pain, constipation, diarrhea, dysphagia, melena, nausea and vomiting.   Genitourinary: Negative for flank pain and urgency.   Neurological: Negative for dizziness, focal weakness, headaches, light-headedness, loss of balance, seizures and weakness.   Psychiatric/Behavioral: Negative for altered mental status and memory loss. The patient is not nervous/anxious.      Objective:     Vital Signs (Most Recent):  Temp: 97.4 °F (36.3 °C) (11/29/20 1107)  Pulse: 67 (11/29/20 1107)  Resp: 18 (11/29/20 1107)  BP: 118/68 (11/29/20 1107)  SpO2: 96 % (11/29/20 1107) Vital Signs (24h Range):  Temp:  [97.4 °F (36.3 °C)-98.6 °F (37 °C)] 97.4 °F (36.3 °C)  Pulse:  [65-87] 67  Resp:  [18-20] 18  SpO2:  [95 %-97 %] 96 %  BP: (100-175)/(61-86) 118/68     Weight: 62.9 kg (138 lb 10.7 oz)  Body mass index is 23.8 kg/m².     SpO2: 96 %  O2 Device (Oxygen Therapy): room air      Intake/Output Summary (Last 24 hours) at 11/29/2020 1248  Last data filed at 11/28/2020 1700  Gross per 24  hour   Intake 300 ml   Output --   Net 300 ml       Lines/Drains/Airways     Central Venous Catheter Line                 Port A Cath Single Lumen right subclavian -- days          Peripheral Intravenous Line                 Peripheral IV - Single Lumen 11/27/20 1523 20 G Right Antecubital 1 day                Physical Exam   Constitutional: She is oriented to person, place, and time.   Eyes: Pupils are equal, round, and reactive to light.   Neck: Normal range of motion. No tracheal deviation present.   Cardiovascular: Normal rate, regular rhythm, normal heart sounds and intact distal pulses. Exam reveals no gallop and no friction rub.   No murmur heard.  Pulses:       Carotid pulses are 2+ on the right side and 2+ on the left side.       Radial pulses are 2+ on the right side and 2+ on the left side.        Femoral pulses are 2+ on the right side and 2+ on the left side.       Popliteal pulses are 2+ on the right side and 2+ on the left side.        Dorsalis pedis pulses are 2+ on the right side and 2+ on the left side.        Posterior tibial pulses are 2+ on the right side and 2+ on the left side.   Pulmonary/Chest: Effort normal and breath sounds normal. No stridor. No respiratory distress. She has no wheezes. She has no rales. She exhibits no tenderness.   Abdominal: She exhibits no distension. There is no abdominal tenderness. There is no rebound.   Musculoskeletal:         General: No tenderness or edema.   Neurological: She is alert and oriented to person, place, and time.   Skin: Skin is warm and dry.       Significant Labs:   CMP   Recent Labs   Lab 11/27/20  1523      K 5.0   *   CO2 21*      BUN 20   CREATININE 1.3   CALCIUM 9.1   PROT 7.1   ALBUMIN 3.3*   BILITOT 0.3   ALKPHOS 78   AST 32   ALT 30   ANIONGAP 8   ESTGFRAFRICA 45*   EGFRNONAA 39*       Significant Imaging: Echocardiogram:   Transthoracic echo (TTE) complete (Cupid Only):   Results for orders placed or performed during  the hospital encounter of 11/16/20   Echo Color Flow Doppler? Yes   Result Value Ref Range    BSA 1.72 m2    TDI SEPTAL 0.05 m/s    LV LATERAL E/E' RATIO 10.55 m/s    LV SEPTAL E/E' RATIO 23.20 m/s    TDI LATERAL 0.11 m/s    LVIDd 4.69 3.5 - 6.0 cm    IVS 1.62 (A) 0.6 - 1.1 cm    Posterior Wall 1.27 (A) 0.6 - 1.1 cm    Ao root annulus 3.26 cm    LVIDs 3.34 2.1 - 4.0 cm    FS 29 28 - 44 %    Sinus 3.41 cm    STJ 3.34 cm    Ascending aorta 3.13 cm    LV mass 277.12 g    LA size 4.59 cm    TAPSE 1.99 cm    Left Ventricle Relative Wall Thickness 0.54 cm    AV mean gradient 7 mmHg    AV valve area 1.63 cm2    AV Velocity Ratio 0.43     AV index (prosthetic) 0.53     MV valve area p 1/2 method 4.06 cm2    E/A ratio 1.23     Mean e' 0.08 m/s    E wave decelartion time 187.02 msec    IVRT 79.92 msec    LVOT diameter 1.99 cm    LVOT area 3.1 cm2    LVOT peak jorge 0.81 m/s    LVOT peak VTI 20.58 cm    Ao peak jorge 1.89 m/s    Ao VTI 39.20 cm    RVOT peak jorge 0.94 m/s    RVOT peak VTI 19.70 cm    Mr max jorge 0.05 m/s    LVOT stroke volume 63.98 cm3    AV peak gradient 14 mmHg    PV mean gradient 1.90 mmHg    E/E' ratio 14.50 m/s    MV Peak E Jorge 1.16 m/s    TR Max Jorge 2.96 m/s    MV stenosis pressure 1/2 time 54.23 ms    MV Peak A Jorge 0.94 m/s    LV Systolic Volume 45.30 mL    LV Systolic Volume Index 26.6 mL/m2    LV Diastolic Volume 102.09 mL    LV Diastolic Volume Index 59.84 mL/m2    LV Mass Index 162 g/m2    RA Major Axis 3.66 cm    Left Atrium Minor Axis 4.52 cm    Left Atrium Major Axis 4.59 cm    Triscuspid Valve Regurgitation Peak Gradient 35 mmHg    Right Atrial Pressure (from IVC) 3 mmHg    TV rest pulmonary artery pressure 38 mmHg    Narrative    · There is left ventricular concentric hypertrophy.  · With normal right ventricular systolic function.  · With normal systolic function. The estimated ejection fraction is 55%.  · Grade II diastolic dysfunction.  · Mild left atrial enlargement.  · Normal central venous  pressure (3 mmHg).  · The estimated PA systolic pressure is 38 mmHg.

## 2020-11-30 VITALS
HEART RATE: 80 BPM | RESPIRATION RATE: 18 BRPM | TEMPERATURE: 97 F | HEIGHT: 64 IN | DIASTOLIC BLOOD PRESSURE: 60 MMHG | SYSTOLIC BLOOD PRESSURE: 111 MMHG | BODY MASS INDEX: 23.22 KG/M2 | WEIGHT: 136 LBS | OXYGEN SATURATION: 96 %

## 2020-11-30 PROBLEM — R07.9 CHEST PAIN: Status: RESOLVED | Noted: 2020-11-27 | Resolved: 2020-11-30

## 2020-11-30 LAB
CV STRESS BASE HR: 63 BPM
DIASTOLIC BLOOD PRESSURE: 70 MMHG
EJECTION FRACTION- HIGH: 65 %
END DIASTOLIC INDEX-HIGH: 158 ML/M2
END DIASTOLIC INDEX-LOW: 94 ML/M2
END SYSTOLIC INDEX-HIGH: 71 ML/M2
END SYSTOLIC INDEX-LOW: 33 ML/M2
NUC STRESS DIASTOLIC VOLUME INDEX: 97
NUC STRESS EJECTION FRACTION: 55 %
NUC STRESS SYSTOLIC VOLUME INDEX: 56
OHS CV CPX 85 PERCENT MAX PREDICTED HEART RATE MALE: 116
OHS CV CPX MAX PREDICTED HEART RATE: 136
OHS CV CPX PATIENT IS FEMALE: 1
OHS CV CPX PATIENT IS MALE: 0
OHS CV CPX PEAK DIASTOLIC BLOOD PRESSURE: 70 MMHG
OHS CV CPX PEAK HEAR RATE: 72 BPM
OHS CV CPX PEAK RATE PRESSURE PRODUCT: 8280
OHS CV CPX PEAK SYSTOLIC BLOOD PRESSURE: 115 MMHG
OHS CV CPX PERCENT MAX PREDICTED HEART RATE ACHIEVED: 53
OHS CV CPX RATE PRESSURE PRODUCT PRESENTING: 7245
POCT GLUCOSE: 110 MG/DL (ref 70–110)
RETIRED EF AND QEF - SEE NOTES: 53 %
SYSTOLIC BLOOD PRESSURE: 115 MMHG

## 2020-11-30 PROCEDURE — 63600175 PHARM REV CODE 636 W HCPCS: Mod: HCNC | Performed by: NURSE PRACTITIONER

## 2020-11-30 PROCEDURE — 25000003 PHARM REV CODE 250: Mod: HCNC | Performed by: INTERNAL MEDICINE

## 2020-11-30 PROCEDURE — G0378 HOSPITAL OBSERVATION PER HR: HCPCS | Mod: HCNC

## 2020-11-30 PROCEDURE — 99225 PR SUBSEQUENT OBSERVATION CARE,LEVEL II: ICD-10-PCS | Mod: HCNC,,, | Performed by: INTERNAL MEDICINE

## 2020-11-30 PROCEDURE — 99225 PR SUBSEQUENT OBSERVATION CARE,LEVEL II: CPT | Mod: HCNC,,, | Performed by: INTERNAL MEDICINE

## 2020-11-30 RX ORDER — REGADENOSON 0.08 MG/ML
0.4 INJECTION, SOLUTION INTRAVENOUS ONCE
Status: COMPLETED | OUTPATIENT
Start: 2020-11-30 | End: 2020-11-30

## 2020-11-30 RX ORDER — REGADENOSON 0.08 MG/ML
0.4 INJECTION, SOLUTION INTRAVENOUS ONCE
Status: DISCONTINUED | OUTPATIENT
Start: 2020-11-30 | End: 2020-11-30

## 2020-11-30 RX ADMIN — METOPROLOL TARTRATE 25 MG: 25 TABLET, FILM COATED ORAL at 09:11

## 2020-11-30 RX ADMIN — BUSPIRONE HYDROCHLORIDE 7.5 MG: 5 TABLET ORAL at 09:11

## 2020-11-30 RX ADMIN — GABAPENTIN 300 MG: 300 CAPSULE ORAL at 04:11

## 2020-11-30 RX ADMIN — LEVOTHYROXINE SODIUM 75 MCG: 75 TABLET ORAL at 06:11

## 2020-11-30 RX ADMIN — DULOXETINE HYDROCHLORIDE 30 MG: 30 CAPSULE, DELAYED RELEASE ORAL at 09:11

## 2020-11-30 RX ADMIN — GABAPENTIN 300 MG: 300 CAPSULE ORAL at 09:11

## 2020-11-30 RX ADMIN — PRAVASTATIN SODIUM 40 MG: 20 TABLET ORAL at 09:11

## 2020-11-30 RX ADMIN — BUSPIRONE HYDROCHLORIDE 7.5 MG: 5 TABLET ORAL at 04:11

## 2020-11-30 RX ADMIN — SODIUM BICARBONATE 650 MG: 650 TABLET ORAL at 09:11

## 2020-11-30 RX ADMIN — SERTRALINE HYDROCHLORIDE 150 MG: 50 TABLET ORAL at 09:11

## 2020-11-30 RX ADMIN — REGADENOSON 0.4 MG: 0.08 INJECTION, SOLUTION INTRAVENOUS at 11:11

## 2020-11-30 RX ADMIN — CLOPIDOGREL 75 MG: 75 TABLET, FILM COATED ORAL at 09:11

## 2020-11-30 NOTE — PLAN OF CARE
POC reviewed with pt; pt verbalizes understanding  Pt able to verbalize needs; denies pain or discomfort at this time  AAOx4; VSS; paced on tele monitor  20g LFA; SL  Room air  Nuclear stress test performed today; awaiting results  Pt had episode of weakness and near syncope upon rising too quickly; will perform orthostatic BP and document further  Pt free of falls; instructed to call staff for mobility  Safety precautions maintained

## 2020-11-30 NOTE — SUBJECTIVE & OBJECTIVE
Interval History: no acute issues noted o/n. Plan for MPI stress test today. Further recs to follow.     Review of Systems   Constitution: Positive for malaise/fatigue.   HENT: Negative for hearing loss and hoarse voice.    Eyes: Negative for blurred vision and visual disturbance.   Cardiovascular: Positive for chest pain and near-syncope. Negative for claudication, dyspnea on exertion, irregular heartbeat, leg swelling, orthopnea, palpitations, paroxysmal nocturnal dyspnea and syncope.        S/p PPM implant   Respiratory: Negative for cough, hemoptysis, shortness of breath, sleep disturbances due to breathing, snoring and wheezing.    Endocrine: Negative for cold intolerance and heat intolerance.   Hematologic/Lymphatic: Bruises/bleeds easily.   Skin: Negative for color change, dry skin and nail changes.   Musculoskeletal: Positive for arthritis and back pain. Negative for joint pain and myalgias.   Gastrointestinal: Negative for bloating, abdominal pain, constipation, nausea and vomiting.   Genitourinary: Negative for dysuria, flank pain, hematuria and hesitancy.   Neurological: Negative for headaches, light-headedness, loss of balance, numbness, paresthesias and weakness.   Psychiatric/Behavioral: Negative for altered mental status.   Allergic/Immunologic: Negative for environmental allergies.     Objective:     Vital Signs (Most Recent):  Temp: 97.3 °F (36.3 °C) (11/30/20 1126)  Pulse: 73 (11/30/20 1126)  Resp: 18 (11/30/20 1126)  BP: 114/70 (11/30/20 1126)  SpO2: 97 % (11/30/20 1126) Vital Signs (24h Range):  Temp:  [96 °F (35.6 °C)-98.3 °F (36.8 °C)] 97.3 °F (36.3 °C)  Pulse:  [60-73] 73  Resp:  [16-20] 18  SpO2:  [94 %-100 %] 97 %  BP: ()/(54-78) 114/70     Weight: 60.5 kg (133 lb 6.1 oz)  Body mass index is 22.89 kg/m².     SpO2: 97 %  O2 Device (Oxygen Therapy): room air      Intake/Output Summary (Last 24 hours) at 11/30/2020 1151  Last data filed at 11/29/2020 1700  Gross per 24 hour   Intake 390  ml   Output --   Net 390 ml       Lines/Drains/Airways     Central Venous Catheter Line                 Port A Cath Single Lumen right subclavian -- days          Peripheral Intravenous Line                 Peripheral IV - Single Lumen 11/29/20 1500 22 G Anterior;Left Forearm less than 1 day                Physical Exam   Constitutional: She is oriented to person, place, and time. She appears well-developed and well-nourished. No distress.   HENT:   Head: Normocephalic and atraumatic.   Eyes: Pupils are equal, round, and reactive to light.   Neck: Normal range of motion and full passive range of motion without pain. Neck supple. No JVD present.   Cardiovascular: Normal rate, regular rhythm, S1 normal, S2 normal and intact distal pulses. PMI is not displaced. Exam reveals no distant heart sounds.   No murmur heard.  Pulses:       Radial pulses are 2+ on the right side and 2+ on the left side.        Dorsalis pedis pulses are 2+ on the right side and 2+ on the left side.   S/P PPM Implant  Chest pain free today   Pulmonary/Chest: Effort normal and breath sounds normal. No accessory muscle usage. No respiratory distress. She has no decreased breath sounds. She has no wheezes. She has no rales.   Abdominal: Soft. Bowel sounds are normal. She exhibits no distension. There is no abdominal tenderness.   Musculoskeletal: Normal range of motion.         General: No edema.      Right ankle: She exhibits no swelling.      Left ankle: She exhibits no swelling.   Neurological: She is alert and oriented to person, place, and time.   Skin: Skin is warm and dry. She is not diaphoretic. No cyanosis. Nails show no clubbing.   Psychiatric: She has a normal mood and affect. Her speech is normal and behavior is normal. Judgment and thought content normal. Cognition and memory are normal.   Nursing note and vitals reviewed.      Significant Labs:   BMP: No results for input(s): GLU, NA, K, CL, CO2, BUN, CREATININE, CALCIUM, MG in the  last 48 hours., CBC   Recent Labs   Lab 11/29/20  0904   WBC 10.77   HGB 11.9*   HCT 39.1      , Troponin   Recent Labs   Lab 11/29/20  0904   TROPONINI 0.021    and All pertinent lab results from the last 24 hours have been reviewed.    Significant Imaging: Echocardiogram:   Transthoracic echo (TTE) complete (Cupid Only):   Results for orders placed or performed during the hospital encounter of 11/16/20   Echo Color Flow Doppler? Yes   Result Value Ref Range    BSA 1.72 m2    TDI SEPTAL 0.05 m/s    LV LATERAL E/E' RATIO 10.55 m/s    LV SEPTAL E/E' RATIO 23.20 m/s    TDI LATERAL 0.11 m/s    LVIDd 4.69 3.5 - 6.0 cm    IVS 1.62 (A) 0.6 - 1.1 cm    Posterior Wall 1.27 (A) 0.6 - 1.1 cm    Ao root annulus 3.26 cm    LVIDs 3.34 2.1 - 4.0 cm    FS 29 28 - 44 %    Sinus 3.41 cm    STJ 3.34 cm    Ascending aorta 3.13 cm    LV mass 277.12 g    LA size 4.59 cm    TAPSE 1.99 cm    Left Ventricle Relative Wall Thickness 0.54 cm    AV mean gradient 7 mmHg    AV valve area 1.63 cm2    AV Velocity Ratio 0.43     AV index (prosthetic) 0.53     MV valve area p 1/2 method 4.06 cm2    E/A ratio 1.23     Mean e' 0.08 m/s    E wave decelartion time 187.02 msec    IVRT 79.92 msec    LVOT diameter 1.99 cm    LVOT area 3.1 cm2    LVOT peak jorge 0.81 m/s    LVOT peak VTI 20.58 cm    Ao peak jorge 1.89 m/s    Ao VTI 39.20 cm    RVOT peak jorge 0.94 m/s    RVOT peak VTI 19.70 cm    Mr max jorge 0.05 m/s    LVOT stroke volume 63.98 cm3    AV peak gradient 14 mmHg    PV mean gradient 1.90 mmHg    E/E' ratio 14.50 m/s    MV Peak E Jorge 1.16 m/s    TR Max Jorge 2.96 m/s    MV stenosis pressure 1/2 time 54.23 ms    MV Peak A Jorge 0.94 m/s    LV Systolic Volume 45.30 mL    LV Systolic Volume Index 26.6 mL/m2    LV Diastolic Volume 102.09 mL    LV Diastolic Volume Index 59.84 mL/m2    LV Mass Index 162 g/m2    RA Major Axis 3.66 cm    Left Atrium Minor Axis 4.52 cm    Left Atrium Major Axis 4.59 cm    Triscuspid Valve Regurgitation Peak Gradient 35 mmHg     Right Atrial Pressure (from IVC) 3 mmHg    TV rest pulmonary artery pressure 38 mmHg    Narrative    · There is left ventricular concentric hypertrophy.  · With normal right ventricular systolic function.  · With normal systolic function. The estimated ejection fraction is 55%.  · Grade II diastolic dysfunction.  · Mild left atrial enlargement.  · Normal central venous pressure (3 mmHg).  · The estimated PA systolic pressure is 38 mmHg.

## 2020-11-30 NOTE — PROGRESS NOTES
[facility] -   Cardiology  Progress Note    Patient Name: Violet Swenson  MRN: [id number]  Admission Date: 11/27/2020  Hospital Length of Stay: 0 days  Code Status: Full Code   Attending Physician: Lorne Horvath, *   Primary Care Physician: Marti Coronel MD  Expected Discharge Date:   Principal Problem:Chest pain    Subjective:   HPI    History of Present Illness: Violet Swenson is a 80 y.o. female patient with PMHx of anemia, anxiety, Afib, CHF, GERD, HTN, CAD, CKD, and stroke who presents to the Emergency Department for CP which onset gradually yesterday. Pt was seen by Dr. Gil (Cardiology) today who referred the pt to the ED for further evaluation. Symptoms are intermittent and moderate in severity. No mitigating or exacerbating factors reported. Associated sxs include N/V and SOB. Patient denies any fever, chills, CP, diarrhea, abd pain, back pain, neck pain, HA, dizziness, and all other sxs at this time. Prior Tx includes ASA and NTG paste with some relief. No further complaints or concerns at this time.   Patient with known cardiac disease and stent in [facility] and mild to moderate disease in the LAD and RCA in 2017. Now with ACS and positive troponin .  History of pacemaker and EKG is consistent with paced heart rate.        Hospital Course:   11/29/20 Patient seen and examined in room today. No complaints.  Troponin elevation trending down. Paced heart rate.  Plan cardiac cath tomorrow. Patient and family are in agreement.    11/30/2020-Patient seen and examined in room, lying in bed. Feels good today. Had near syncopal event yesterday. Plan for MPI stress test today. Further recs to follow.     Interval History: no acute issues noted o/n. Plan for MPI stress test today. Further recs to follow.     Review of Systems   Constitution: Positive for malaise/fatigue.   HENT: Negative for hearing loss and hoarse voice.    Eyes: Negative for blurred vision and visual  disturbance.   Cardiovascular: Positive for chest pain and near-syncope. Negative for claudication, dyspnea on exertion, irregular heartbeat, leg swelling, orthopnea, palpitations, paroxysmal nocturnal dyspnea and syncope.        S/p PPM implant   Respiratory: Negative for cough, hemoptysis, shortness of breath, sleep disturbances due to breathing, snoring and wheezing.    Endocrine: Negative for cold intolerance and heat intolerance.   Hematologic/Lymphatic: Bruises/bleeds easily.   Skin: Negative for color change, dry skin and nail changes.   Musculoskeletal: Positive for arthritis and back pain. Negative for joint pain and myalgias.   Gastrointestinal: Negative for bloating, abdominal pain, constipation, nausea and vomiting.   Genitourinary: Negative for dysuria, flank pain, hematuria and hesitancy.   Neurological: Negative for headaches, light-headedness, loss of balance, numbness, paresthesias and weakness.   Psychiatric/Behavioral: Negative for altered mental status.   Allergic/Immunologic: Negative for environmental allergies.     Objective:     Vital Signs (Most Recent):  Temp: 97.3 °F (36.3 °C) (11/30/20 1126)  Pulse: 73 (11/30/20 1126)  Resp: 18 (11/30/20 1126)  BP: 114/70 (11/30/20 1126)  SpO2: 97 % (11/30/20 1126) Vital Signs (24h Range):  Temp:  [96 °F (35.6 °C)-98.3 °F (36.8 °C)] 97.3 °F (36.3 °C)  Pulse:  [60-73] 73  Resp:  [16-20] 18  SpO2:  [94 %-100 %] 97 %  BP: ()/(54-78) 114/70     Weight: 60.5 kg (133 lb 6.1 oz)  Body mass index is 22.89 kg/m².     SpO2: 97 %  O2 Device (Oxygen Therapy): room air      Intake/Output Summary (Last 24 hours) at 11/30/2020 1151  Last data filed at 11/29/2020 1700  Gross per 24 hour   Intake 390 ml   Output --   Net 390 ml       Lines/Drains/Airways     Central Venous Catheter Line                 Port A Cath Single Lumen right subclavian -- days          Peripheral Intravenous Line                 Peripheral IV - Single Lumen 11/29/20 1500 22 G Anterior;Left  Forearm less than 1 day                Physical Exam   Constitutional: She is oriented to person, place, and time. She appears well-developed and well-nourished. No distress.   HENT:   Head: Normocephalic and atraumatic.   Eyes: Pupils are equal, round, and reactive to light.   Neck: Normal range of motion and full passive range of motion without pain. Neck supple. No JVD present.   Cardiovascular: Normal rate, regular rhythm, S1 normal, S2 normal and intact distal pulses. PMI is not displaced. Exam reveals no distant heart sounds.   No murmur heard.  Pulses:       Radial pulses are 2+ on the right side and 2+ on the left side.        Dorsalis pedis pulses are 2+ on the right side and 2+ on the left side.   S/P PPM Implant  Chest pain free today   Pulmonary/Chest: Effort normal and breath sounds normal. No accessory muscle usage. No respiratory distress. She has no decreased breath sounds. She has no wheezes. She has no rales.   Abdominal: Soft. Bowel sounds are normal. She exhibits no distension. There is no abdominal tenderness.   Musculoskeletal: Normal range of motion.         General: No edema.      Right ankle: She exhibits no swelling.      Left ankle: She exhibits no swelling.   Neurological: She is alert and oriented to person, place, and time.   Skin: Skin is warm and dry. She is not diaphoretic. No cyanosis. Nails show no clubbing.   Psychiatric: She has a normal mood and affect. Her speech is normal and behavior is normal. Judgment and thought content normal. Cognition and memory are normal.   Nursing note and vitals reviewed.      Significant Labs:   BMP: No results for input(s): GLU, NA, K, CL, CO2, BUN, CREATININE, CALCIUM, MG in the last 48 hours., CBC   Recent Labs   Lab 11/29/20  0904   WBC 10.77   HGB 11.9*   HCT 39.1      , Troponin   Recent Labs   Lab 11/29/20  0904   TROPONINI 0.021    and All pertinent lab results from the last 24 hours have been reviewed.    Significant Imaging:  Echocardiogram:   Transthoracic echo (TTE) complete (Cupid Only):   Results for orders placed or performed during the hospital encounter of 11/16/20   Echo Color Flow Doppler? Yes   Result Value Ref Range    BSA 1.72 m2    TDI SEPTAL 0.05 m/s    LV LATERAL E/E' RATIO 10.55 m/s    LV SEPTAL E/E' RATIO 23.20 m/s    TDI LATERAL 0.11 m/s    LVIDd 4.69 3.5 - 6.0 cm    IVS 1.62 (A) 0.6 - 1.1 cm    Posterior Wall 1.27 (A) 0.6 - 1.1 cm    Ao root annulus 3.26 cm    LVIDs 3.34 2.1 - 4.0 cm    FS 29 28 - 44 %    Sinus 3.41 cm    STJ 3.34 cm    Ascending aorta 3.13 cm    LV mass 277.12 g    LA size 4.59 cm    TAPSE 1.99 cm    Left Ventricle Relative Wall Thickness 0.54 cm    AV mean gradient 7 mmHg    AV valve area 1.63 cm2    AV Velocity Ratio 0.43     AV index (prosthetic) 0.53     MV valve area p 1/2 method 4.06 cm2    E/A ratio 1.23     Mean e' 0.08 m/s    E wave decelartion time 187.02 msec    IVRT 79.92 msec    LVOT diameter 1.99 cm    LVOT area 3.1 cm2    LVOT peak jorge 0.81 m/s    LVOT peak VTI 20.58 cm    Ao peak jorge 1.89 m/s    Ao VTI 39.20 cm    RVOT peak jorge 0.94 m/s    RVOT peak VTI 19.70 cm    Mr max jorge 0.05 m/s    LVOT stroke volume 63.98 cm3    AV peak gradient 14 mmHg    PV mean gradient 1.90 mmHg    E/E' ratio 14.50 m/s    MV Peak E Jorge 1.16 m/s    TR Max Jorge 2.96 m/s    MV stenosis pressure 1/2 time 54.23 ms    MV Peak A Jorge 0.94 m/s    LV Systolic Volume 45.30 mL    LV Systolic Volume Index 26.6 mL/m2    LV Diastolic Volume 102.09 mL    LV Diastolic Volume Index 59.84 mL/m2    LV Mass Index 162 g/m2    RA Major Axis 3.66 cm    Left Atrium Minor Axis 4.52 cm    Left Atrium Major Axis 4.59 cm    Triscuspid Valve Regurgitation Peak Gradient 35 mmHg    Right Atrial Pressure (from IVC) 3 mmHg    TV rest pulmonary artery pressure 38 mmHg    Narrative    · There is left ventricular concentric hypertrophy.  · With normal right ventricular systolic function.  · With normal systolic function. The estimated ejection  fraction is 55%.  · Grade II diastolic dysfunction.  · Mild left atrial enlargement.  · Normal central venous pressure (3 mmHg).  · The estimated PA systolic pressure is 38 mmHg.        Assessment and Plan:       * Chest pain  Plan for MPI stress test today  Continue ASA, Statin, Plavix, BB  Further recs to follow.     Stage 3 chronic kidney disease  Monitor with daily labs    Coronary artery disease : multiple vessels, s/p PTCA  Chest pain free  MPI stress test today  Continuie ASA, Plavix, BB, statin    Pacemaker  stable    Hypothyroidism (acquired)  Continue synthroid  Mgmt per primary team    Essential hypertension  On medical therapy  Continue BB        VTE Risk Mitigation (From admission, onward)         Ordered     IP VTE HIGH RISK PATIENT  Once      11/27/20 2334     Place sequential compression device  Until discontinued      11/27/20 2334     Place sequential compression device  Until discontinued      11/27/20 2215                DAVIAN Santoyo  Cardiology  [facility]

## 2020-11-30 NOTE — PLAN OF CARE
Patient continues to be monitored and treated. Cardiac monitor in place. Patient fluctuates between normal sinus and paced on the monitor. Thearapeutic sleep environment supported. Will continue to monitor and treat.

## 2020-11-30 NOTE — PLAN OF CARE
Swer met with pt at bedside for initial assessment. Pt lives with spouse and was independent with ADLs prior to admission. Pt's spouse is help at home. Pt 's daughter will provide transportation at discharge. Pt uses a [employer]. Pt denied having home health in the past. Pt does not have a problem obtaining medication and spouse provides transportation to medical appointments. Pt does not have an advanced directive at this time. SWer provided a transitional care folder, information on advanced directives, information on pharmacy bedside delivery, and discharge planning begins on admission with contact information for any needs/questions.    PCP; Marti Coronel MD  Pharm;   [facility] - [place], [place] - [address]   [address] [address]  Phone: [phone] Fax: [phone]  My Chart; Active  Bedside Delivery; Yes       11/30/20 4742   Discharge Assessment   Assessment Type Discharge Planning Assessment   Confirmed/corrected address and phone number on facesheet? Yes   Assessment information obtained from? Patient   Communicated expected length of stay with patient/caregiver yes   Prior to hospitilization cognitive status: Alert/Oriented   Prior to hospitalization functional status: Independent   Current cognitive status: Alert/Oriented   Current Functional Status: Independent   Facility Arrived From: home   Lives With spouse   Able to Return to Prior Arrangements yes   Is patient able to care for self after discharge? Yes   Who are your caregiver(s) and their phone number(s)? Lenin Holdenvelgeneva (spouse) [phone]   Patient's perception of discharge disposition home or selfcare   Readmission Within the Last 30 Days no previous admission in last 30 days   Patient currently being followed by outpatient case management? No   Patient currently receives any other outside agency services? No   Equipment Currently Used at Home none   Do you have any problems affording any of your  prescribed medications? No   Is the patient taking medications as prescribed? yes   Does the patient have transportation home? Yes   Transportation Anticipated family or friend will provide   Discharge Plan A Home with family   DME Needed Upon Discharge  none   Patient/Family in Agreement with Plan yes

## 2020-11-30 NOTE — PLAN OF CARE
POC reviewed, verbalized understanding. Pt remained free from falls, fall precautions in place. Pt is SR 1 AVB on monitor, 8644. VSS. No other c/o at this time. PIV intact. Call bell and personal belongings within reach. Hourly rounding complete. Reminded to call for assistance. Will continue to monitor.

## 2020-12-01 ENCOUNTER — OFFICE VISIT (OUTPATIENT)
Dept: INTERNAL MEDICINE | Facility: CLINIC | Age: 80
End: 2020-12-01
Payer: MEDICARE

## 2020-12-01 VITALS
RESPIRATION RATE: 18 BRPM | WEIGHT: 133.94 LBS | SYSTOLIC BLOOD PRESSURE: 90 MMHG | TEMPERATURE: 98 F | HEART RATE: 96 BPM | HEIGHT: 64 IN | BODY MASS INDEX: 22.86 KG/M2 | DIASTOLIC BLOOD PRESSURE: 60 MMHG

## 2020-12-01 DIAGNOSIS — R29.6 FREQUENT FALLS: ICD-10-CM

## 2020-12-01 DIAGNOSIS — R53.1 RIGHT SIDED WEAKNESS: ICD-10-CM

## 2020-12-01 DIAGNOSIS — N18.30 STAGE 3 CHRONIC KIDNEY DISEASE, UNSPECIFIED WHETHER STAGE 3A OR 3B CKD: Chronic | ICD-10-CM

## 2020-12-01 DIAGNOSIS — I95.1 POSTURAL HYPOTENSION: ICD-10-CM

## 2020-12-01 DIAGNOSIS — I63.9 CEREBROVASCULAR ACCIDENT (CVA), UNSPECIFIED MECHANISM: ICD-10-CM

## 2020-12-01 DIAGNOSIS — E78.2 MIXED HYPERLIPIDEMIA: Chronic | ICD-10-CM

## 2020-12-01 DIAGNOSIS — I10 ESSENTIAL HYPERTENSION: Chronic | ICD-10-CM

## 2020-12-01 DIAGNOSIS — Z09 HOSPITAL DISCHARGE FOLLOW-UP: Primary | ICD-10-CM

## 2020-12-01 DIAGNOSIS — I50.32 CHRONIC DIASTOLIC HEART FAILURE: ICD-10-CM

## 2020-12-01 PROBLEM — M25.512 ACUTE PAIN OF LEFT SHOULDER: Status: RESOLVED | Noted: 2020-01-31 | Resolved: 2020-12-01

## 2020-12-01 PROBLEM — E87.5 HYPERKALEMIA: Status: RESOLVED | Noted: 2020-08-27 | Resolved: 2020-12-01

## 2020-12-01 PROCEDURE — 3288F FALL RISK ASSESSMENT DOCD: CPT | Mod: HCNC,CPTII,S$GLB, | Performed by: NURSE PRACTITIONER

## 2020-12-01 PROCEDURE — 1126F PR PAIN SEVERITY QUANTIFIED, NO PAIN PRESENT: ICD-10-PCS | Mod: HCNC,S$GLB,, | Performed by: NURSE PRACTITIONER

## 2020-12-01 PROCEDURE — 3288F PR FALLS RISK ASSESSMENT DOCUMENTED: ICD-10-PCS | Mod: HCNC,CPTII,S$GLB, | Performed by: NURSE PRACTITIONER

## 2020-12-01 PROCEDURE — 99214 PR OFFICE/OUTPT VISIT, EST, LEVL IV, 30-39 MIN: ICD-10-PCS | Mod: HCNC,S$GLB,, | Performed by: NURSE PRACTITIONER

## 2020-12-01 PROCEDURE — 1157F PR ADVANCE CARE PLAN OR EQUIV PRESENT IN MEDICAL RECORD: ICD-10-PCS | Mod: HCNC,S$GLB,, | Performed by: NURSE PRACTITIONER

## 2020-12-01 PROCEDURE — 99999 PR PBB SHADOW E&M-EST. PATIENT-LVL IV: CPT | Mod: PBBFAC,HCNC,, | Performed by: NURSE PRACTITIONER

## 2020-12-01 PROCEDURE — 99999 PR PBB SHADOW E&M-EST. PATIENT-LVL IV: ICD-10-PCS | Mod: PBBFAC,HCNC,, | Performed by: NURSE PRACTITIONER

## 2020-12-01 PROCEDURE — 1157F ADVNC CARE PLAN IN RCRD: CPT | Mod: HCNC,S$GLB,, | Performed by: NURSE PRACTITIONER

## 2020-12-01 PROCEDURE — 1100F PTFALLS ASSESS-DOCD GE2>/YR: CPT | Mod: HCNC,CPTII,S$GLB, | Performed by: NURSE PRACTITIONER

## 2020-12-01 PROCEDURE — 99214 OFFICE O/P EST MOD 30 MIN: CPT | Mod: HCNC,S$GLB,, | Performed by: NURSE PRACTITIONER

## 2020-12-01 PROCEDURE — 1126F AMNT PAIN NOTED NONE PRSNT: CPT | Mod: HCNC,S$GLB,, | Performed by: NURSE PRACTITIONER

## 2020-12-01 PROCEDURE — 1100F PR PT FALLS ASSESS DOC 2+ FALLS/FALL W/INJURY/YR: ICD-10-PCS | Mod: HCNC,CPTII,S$GLB, | Performed by: NURSE PRACTITIONER

## 2020-12-01 RX ORDER — METOPROLOL TARTRATE 25 MG/1
12.5 TABLET, FILM COATED ORAL 2 TIMES DAILY
Qty: 30 TABLET | Refills: 0 | Status: SHIPPED | OUTPATIENT
Start: 2020-12-01 | End: 2020-12-04

## 2020-12-01 NOTE — Clinical Note
Just an FYI ...  Saw for hospital f/u today and decreased BB in half due to hypotension   concerned for falls and bleeding risk   gave BP parameters to report   Has apt on 12/8 (first available) with you   Please don't hesitate to let me know if I need to address anything else before she gets to you     Thanks!   Rupesh Dsouza, EUNICEP

## 2020-12-01 NOTE — PATIENT INSTRUCTIONS
Check your BP daily and keep a log   Bring it (and your cuff) to your next appointment for provider to review     Notify us if BP < 90/50 or > 150/90    Decrease metoprolol from 25mg to 12.5mg twice daily

## 2020-12-01 NOTE — PROGRESS NOTES
Violet Swenson 80 y.o. female     Chief Complaint:  Chief Complaint   Patient presents with    Hospital Follow Up     2 week follow up       History of Present Illness:  Pt is known to provider and presents for:       Hospital f/u  Assumed CVA on 11/16   couldn't not confirm on CT   MRI contraindicated due to pacemaker   Admit sx - stuttering speech & R sided weakness  now very mild and improving     Back to ED on 11/27 for CP and HTN   improved with lasix   Stress test yesterday WNL     Most recent labs 11/29 - stable/improving       Weak/dizzy spells   Episodes started a few months ago   Random  associated with position changes  2 episodes in hospital   Shaking and diffculty speaking  Usually falls/has to be assisted to floor/seat   Afraid she will break a bone/hit her head   Checks BP at home   Frequently getting systolic below 90s   currently on toprol 25mg BID     Transitional Care Note    Family and/or Caretaker present at visit?  Yes.  Diagnostic tests reviewed/disposition: I have reviewed all completed as well as pending diagnostic tests at the time of discharge.  Disease/illness education: yes  Home health/community services discussion/referrals: Patient does not have home health established from hospital visit.  They do not need home health.  If needed, we will set up home health for the patient.   Establishment or re-establishment of referral orders for community resources: No other necessary community resources.   Discussion with other health care providers: PCP - [place].     Exam:  Review of Systems   Constitutional: Positive for activity change (decreased ) and fatigue. Negative for chills, diaphoresis and unexpected weight change.   HENT: Negative for trouble swallowing.    Eyes: Negative for discharge and visual disturbance.   Respiratory: Negative for shortness of breath.    Cardiovascular: Negative for chest pain and leg swelling.   Gastrointestinal: Negative for diarrhea, nausea and  vomiting.   Genitourinary: Negative for difficulty urinating, dysuria and urgency.   Musculoskeletal: Positive for gait problem (uses rolator ).        R sided weakness    Skin: Negative for wound.   Neurological: Positive for dizziness (chronic and episodic ), speech difficulty (during dizzy episodes ) and light-headedness.   Hematological: Bruises/bleeds easily.   Psychiatric/Behavioral: Negative for confusion.     Physical Exam  Vitals signs and nursing note reviewed.   Constitutional:       General: She is not in acute distress.     Appearance: She is well-developed and underweight. She is not ill-appearing, toxic-appearing or diaphoretic.   HENT:      Head: Normocephalic and atraumatic.      Comments: glasses     Right Ear: External ear normal.      Left Ear: External ear normal.   Eyes:      General: No scleral icterus.        Right eye: No discharge.         Left eye: No discharge.      Conjunctiva/sclera: Conjunctivae normal.      Pupils: Pupils are equal, round, and reactive to light.   Neck:      Musculoskeletal: Normal range of motion.      Trachea: No tracheal deviation.   Cardiovascular:      Rate and Rhythm: Normal rate and regular rhythm.      Comments: Pacemaker   Pulmonary:      Effort: Pulmonary effort is normal. No respiratory distress.      Breath sounds: Normal breath sounds. No stridor. No wheezing or rales.   Chest:      Chest wall: No tenderness.   Abdominal:      General: Bowel sounds are normal. There is no distension.      Palpations: Abdomen is soft.      Tenderness: There is no abdominal tenderness.   Musculoskeletal: Normal range of motion.   Skin:     General: Skin is warm and dry.      Coloration: Skin is not pale.      Findings: No erythema or rash.   Neurological:      General: No focal deficit present.      Mental Status: She is alert and oriented to person, place, and time. Mental status is at baseline.      Motor: Weakness present.      Gait: Gait abnormal.   Psychiatric:          Mood and Affect: Mood normal.         Speech: Speech normal.         Behavior: Behavior normal. Behavior is cooperative.         Thought Content: Thought content normal.         Judgment: Judgment normal.         Most Recent Laboratory Results Reviewed ({Yes)  Lab Results   Component Value Date    WBC 10.77 11/29/2020    HGB 11.9 (L) 11/29/2020    HCT 39.1 11/29/2020     11/29/2020    CHOL 101 (L) 11/16/2020    TRIG 96 11/16/2020    HDL 42 11/16/2020    ALT 30 11/27/2020    AST 32 11/27/2020     11/27/2020    K 5.0 11/27/2020     (H) 11/27/2020    CREATININE 1.3 11/27/2020    BUN 20 11/27/2020    CO2 21 (L) 11/27/2020    TSH 4.087 (H) 11/16/2020    INR 1.0 11/27/2020    HGBA1C 4.9 11/16/2020       Assessment     ICD-10-CM ICD-9-CM   1. Hospital discharge follow-up  Z09 V67.59   2. Cerebrovascular accident (CVA), unspecified mechanism  I63.9 434.91   3. Right sided weakness  R53.1 728.87   4. Frequent falls  R29.6 V15.88   5. Essential hypertension  I10 401.9   6. Mixed hyperlipidemia  E78.2 272.2   7. Stage 3 chronic kidney disease, unspecified whether stage 3a or 3b CKD  N18.30 585.3   8. Chronic diastolic heart failure  I50.32 428.32   9. Postural hypotension  I95.1 458.0        Plan   Hospital discharge follow-up    Cerebrovascular accident (CVA), unspecified mechanism  Statin, ASA, plavix, BB     R sided weakness   - due to above   -     Ambulatory referral/consult to Physical/Occupational Therapy; Future; Expected date: 12/08/2020    Frequent falls  Suspect orthostatic from BB  Concerning due to anticoagulation     Essential hypertension  Overcorrected at this time (90/60)   Decrease toprol to 12.5mg BID   F/u with cards, first available     Check BP daily and keep a log   Bring log and BP cuff to next apt     HLD   Continue statin     Stage 3 chronic kidney disease, unspecified whether stage 3a or 3b CKD  Stable     Chronic diastolic heart failure  Last BMP > 1000 in ER, given lasix, CP  improved   No signs of excess fluid today  F/u with cards, first available     Postural hypotension  Half BB dose   -     metoprolol tartrate (LOPRESSOR) 25 MG tablet; Take 0.5 tablets (12.5 mg total) by mouth 2 (two) times daily.  Dispense: 30 tablet; Refill: 0    Follow up if symptoms worsen or fail to improve, for Next routine f/u apt.    Future Appointments     Date Provider Specialty Appt Notes    12/2/2020 Robert Gomez MD Nephrology EP/3 month f/u//[facility]    12/7/2020  Lab [facility]    12/8/2020 Nader Gil MD Cardiology f/u appt// med ck  .    12/9/2020 Da Matson MD Hematology and Oncology 3 mo f/u/prior lab/cbd    12/18/2020 Madi Anton MD Pain Medicine INJ F/U     1/18/2021 Guy Mireles, OD Ophthalmology Annual eye exam//////NS///////CW    2/25/2021 Marti Coronel MD Internal Medicine 6 mon f/u     3/16/2021  Lab lab/di    3/16/2021 Dilshad Rogers MD Rheumatology rtc/6mo/labs/Prolia/di    3/16/2021  Chemotherapy Prolia-T

## 2020-12-02 ENCOUNTER — TELEPHONE (OUTPATIENT)
Dept: CARDIOLOGY | Facility: CLINIC | Age: 80
End: 2020-12-02

## 2020-12-02 ENCOUNTER — OFFICE VISIT (OUTPATIENT)
Dept: NEPHROLOGY | Facility: CLINIC | Age: 80
End: 2020-12-02
Payer: MEDICARE

## 2020-12-02 VITALS
HEIGHT: 64 IN | WEIGHT: 138 LBS | HEART RATE: 80 BPM | DIASTOLIC BLOOD PRESSURE: 60 MMHG | BODY MASS INDEX: 23.56 KG/M2 | SYSTOLIC BLOOD PRESSURE: 106 MMHG

## 2020-12-02 DIAGNOSIS — N18.31 CHRONIC KIDNEY DISEASE (CKD) STAGE G3A/A1, MODERATELY DECREASED GLOMERULAR FILTRATION RATE (GFR) BETWEEN 45-59 ML/MIN/1.73 SQUARE METER AND ALBUMINURIA CREATININE RATIO LESS THAN 30 MG/G: Primary | ICD-10-CM

## 2020-12-02 DIAGNOSIS — N27.0 SMALL KIDNEY, UNILATERAL: ICD-10-CM

## 2020-12-02 DIAGNOSIS — N14.0 ANALGESIC NEPHROPATHY: ICD-10-CM

## 2020-12-02 DIAGNOSIS — E87.5 HYPERKALEMIA: ICD-10-CM

## 2020-12-02 DIAGNOSIS — E87.20 ACIDOSIS: ICD-10-CM

## 2020-12-02 DIAGNOSIS — E55.9 VITAMIN D DEFICIENCY: ICD-10-CM

## 2020-12-02 DIAGNOSIS — E21.3 HPTH (HYPERPARATHYROIDISM): ICD-10-CM

## 2020-12-02 DIAGNOSIS — I25.5 ISCHEMIC CARDIOMYOPATHY: Chronic | ICD-10-CM

## 2020-12-02 PROCEDURE — 99499 UNLISTED E&M SERVICE: CPT | Mod: S$GLB,,, | Performed by: INTERNAL MEDICINE

## 2020-12-02 PROCEDURE — 3078F PR MOST RECENT DIASTOLIC BLOOD PRESSURE < 80 MM HG: ICD-10-PCS | Mod: HCNC,CPTII,S$GLB, | Performed by: INTERNAL MEDICINE

## 2020-12-02 PROCEDURE — 3078F DIAST BP <80 MM HG: CPT | Mod: HCNC,CPTII,S$GLB, | Performed by: INTERNAL MEDICINE

## 2020-12-02 PROCEDURE — 99999 PR PBB SHADOW E&M-EST. PATIENT-LVL III: ICD-10-PCS | Mod: PBBFAC,HCNC,, | Performed by: INTERNAL MEDICINE

## 2020-12-02 PROCEDURE — 99499 RISK ADDL DX/OHS AUDIT: ICD-10-PCS | Mod: S$GLB,,, | Performed by: INTERNAL MEDICINE

## 2020-12-02 PROCEDURE — 1157F ADVNC CARE PLAN IN RCRD: CPT | Mod: HCNC,S$GLB,, | Performed by: INTERNAL MEDICINE

## 2020-12-02 PROCEDURE — 3288F PR FALLS RISK ASSESSMENT DOCUMENTED: ICD-10-PCS | Mod: HCNC,CPTII,S$GLB, | Performed by: INTERNAL MEDICINE

## 2020-12-02 PROCEDURE — 1159F PR MEDICATION LIST DOCUMENTED IN MEDICAL RECORD: ICD-10-PCS | Mod: HCNC,S$GLB,, | Performed by: INTERNAL MEDICINE

## 2020-12-02 PROCEDURE — 99214 OFFICE O/P EST MOD 30 MIN: CPT | Mod: HCNC,S$GLB,, | Performed by: INTERNAL MEDICINE

## 2020-12-02 PROCEDURE — 1101F PR PT FALLS ASSESS DOC 0-1 FALLS W/OUT INJ PAST YR: ICD-10-PCS | Mod: HCNC,CPTII,S$GLB, | Performed by: INTERNAL MEDICINE

## 2020-12-02 PROCEDURE — 1126F PR PAIN SEVERITY QUANTIFIED, NO PAIN PRESENT: ICD-10-PCS | Mod: HCNC,S$GLB,, | Performed by: INTERNAL MEDICINE

## 2020-12-02 PROCEDURE — 3288F FALL RISK ASSESSMENT DOCD: CPT | Mod: HCNC,CPTII,S$GLB, | Performed by: INTERNAL MEDICINE

## 2020-12-02 PROCEDURE — 1159F MED LIST DOCD IN RCRD: CPT | Mod: HCNC,S$GLB,, | Performed by: INTERNAL MEDICINE

## 2020-12-02 PROCEDURE — 1126F AMNT PAIN NOTED NONE PRSNT: CPT | Mod: HCNC,S$GLB,, | Performed by: INTERNAL MEDICINE

## 2020-12-02 PROCEDURE — 99214 PR OFFICE/OUTPT VISIT, EST, LEVL IV, 30-39 MIN: ICD-10-PCS | Mod: HCNC,S$GLB,, | Performed by: INTERNAL MEDICINE

## 2020-12-02 PROCEDURE — 1157F PR ADVANCE CARE PLAN OR EQUIV PRESENT IN MEDICAL RECORD: ICD-10-PCS | Mod: HCNC,S$GLB,, | Performed by: INTERNAL MEDICINE

## 2020-12-02 PROCEDURE — 3074F SYST BP LT 130 MM HG: CPT | Mod: HCNC,CPTII,S$GLB, | Performed by: INTERNAL MEDICINE

## 2020-12-02 PROCEDURE — 1101F PT FALLS ASSESS-DOCD LE1/YR: CPT | Mod: HCNC,CPTII,S$GLB, | Performed by: INTERNAL MEDICINE

## 2020-12-02 PROCEDURE — 3074F PR MOST RECENT SYSTOLIC BLOOD PRESSURE < 130 MM HG: ICD-10-PCS | Mod: HCNC,CPTII,S$GLB, | Performed by: INTERNAL MEDICINE

## 2020-12-02 PROCEDURE — 99999 PR PBB SHADOW E&M-EST. PATIENT-LVL III: CPT | Mod: PBBFAC,HCNC,, | Performed by: INTERNAL MEDICINE

## 2020-12-02 NOTE — PROGRESS NOTES
Subjective:       Patient ID: Violet Swenson is a 80 y.o. White female who presents for follow-up evaluation of Chronic Kidney Disease and Hyperkalemia    Hypertension  Pertinent negatives include no chest pain, headaches, palpitations or shortness of breath.        Patient is a 80-year-old female with history of hypertension and lymphoma.  Patient had chemotherapy 2017 resulting in congestive heart failure and cardiomyopathy.  Patient has had rise in LFTs.patient had been taking Motrin which 15 mg but was seen by Dr. PANIAGUA in rheumatology who reduced the Mobic down to 7.5 mg.  Patient has been seen by Dr. Morales in hepatology for increased LFTs.    February 2018 patient evaluated for rising creatinine.  Workup ordered.  Noted left sided atrophic kidney and lobulated kidney on the right side which is enlarged based on the CT scan from 2017 09/2018 s/p falls x 3 in last few weeks ; renal function stable     2/2020 severe dizziness consult audiology       August 2020 creatinine is up to 1.9 with acute kidney injury.  Recent hyperkalemia noted.  All oncology, Rheumatology and cardiology records reviewed. Weight is down by 5   Lb.  Stop Mobic.  Stop Lasix.  Follow-up in a 3-4 weeks  stop colchicine. Stop allopurinol     September 2020 patient comes back for follow-up.  Acute kidney injury has improved off Mobic and off allopurinol.  Creatinine down to 1.3.    December 2020 post hospital discharge follow-up.  History of stroke.  MRI could not be done due to pacemaker.  Stress test negative.  EF 55%.  Creatinine stable at 1.3.  Heart rate stable at 80.  Blood pressure is stable.  Potassium is stable. D/c neurontin ( dizzy and off balance not the betablocker )    Review of Systems   Constitutional: Negative for activity change, appetite change, chills, diaphoresis, fatigue, fever and unexpected weight change.   HENT: Negative for congestion, dental problem, drooling, postnasal drip, rhinorrhea and voice change.   "  Eyes: Negative for discharge.   Respiratory: Negative for apnea, cough, choking, chest tightness, shortness of breath, wheezing and stridor.    Cardiovascular: Negative for chest pain, palpitations and leg swelling.   Gastrointestinal: Negative for abdominal distention, blood in stool, constipation, diarrhea, nausea, rectal pain and vomiting.   Endocrine: Negative for cold intolerance, heat intolerance, polydipsia and polyuria.   Genitourinary: Negative for decreased urine volume, difficulty urinating, dysuria, enuresis, flank pain, frequency, hematuria and urgency.   Musculoskeletal: Positive for arthralgias, joint swelling and myalgias. Negative for back pain and gait problem.   Skin: Negative for rash.   Allergic/Immunologic: Negative for food allergies and immunocompromised state.   Neurological: Negative for dizziness, tremors, syncope, numbness and headaches.   Hematological: Does not bruise/bleed easily.   Psychiatric/Behavioral: Negative for agitation, behavioral problems and self-injury. The patient is not nervous/anxious and is not hyperactive.    All other systems reviewed and are negative.      Objective:   /60   Pulse 80   Ht 5' 4" (1.626 m)   Wt 62.6 kg (138 lb 0.1 oz)   BMI 23.69 kg/m²      Physical Exam  Vitals signs and nursing note reviewed.   Constitutional:       General: She is not in acute distress.     Appearance: She is not diaphoretic.   HENT:      Head: Normocephalic and atraumatic.      Nose: Nose normal.   Eyes:      Conjunctiva/sclera: Conjunctivae normal.      Pupils: Pupils are equal, round, and reactive to light.   Neck:      Musculoskeletal: Normal range of motion.      Thyroid: No thyromegaly.      Vascular: No JVD.      Trachea: No tracheal deviation.   Cardiovascular:      Rate and Rhythm: Normal rate and regular rhythm.      Heart sounds: Normal heart sounds. No murmur. No friction rub. No gallop.    Pulmonary:      Effort: Pulmonary effort is normal. No respiratory " distress.      Breath sounds: Normal breath sounds. No wheezing or rales.   Chest:      Chest wall: No tenderness.   Abdominal:      General: Bowel sounds are normal. There is no distension.      Palpations: Abdomen is soft. There is no mass.      Tenderness: There is no abdominal tenderness.      Hernia: No hernia is present.   Musculoskeletal: Normal range of motion.         General: No tenderness or deformity.      Comments: Walks with walker    Skin:     General: Skin is warm.      Coloration: Skin is pale.      Findings: No erythema.   Neurological:      Mental Status: She is alert and oriented to person, place, and time.      Cranial Nerves: No cranial nerve deficit.      Motor: No abnormal muscle tone.      Coordination: Coordination normal.      Deep Tendon Reflexes: Reflexes are normal and symmetric. Reflexes normal.   Psychiatric:         Behavior: Behavior normal.         Thought Content: Thought content normal.         Judgment: Judgment normal.           Lab Results   Component Value Date    CREATININE 1.3 11/27/2020    BUN 20 11/27/2020     11/27/2020    K 5.0 11/27/2020     (H) 11/27/2020    CO2 21 (L) 11/27/2020     Lab Results   Component Value Date    WBC 10.77 11/29/2020    HGB 11.9 (L) 11/29/2020    HCT 39.1 11/29/2020     (H) 11/29/2020     11/29/2020     Lab Results   Component Value Date    .9 (H) 09/01/2020    CALCIUM 9.1 11/27/2020    PHOS 4.0 09/01/2020        Lab Results   Component Value Date    URICACID 6.2 (H) 08/20/2018       Assessment:    )    1. Chronic kidney disease (CKD) stage G3a/A1, moderately decreased glomerular filtration rate (GFR) between 45-59 mL/min/1.73 square meter and albuminuria creatinine ratio less than 30 mg/g    2. Analgesic nephropathy    3. Small kidney, unilateral    4. Acidosis    5. Hyperkalemia    6. HPTH (hyperparathyroidism)    7. Vitamin D deficiency        Plan:        1.   Chronic kidney disease stage III:;     Much  improved off Lasix colchicine and Mobic.      2.  Small size kidneys/atrophic kidney on the left side:no renal artery stenosis     3. Vit D def:+ sec HPT: on weekly Vit D +  calcitriol 0.25 mcg MWF :  Calcium and phosphorus are staying stable    4. Acidosis: on bicarbonate     5. Hyperkalemia: due to acidosis; off cozaar; off mobic  Patient is on  lokelma 5 G daily --stable     6. Gout + arthritis :  allopurinol 300 mg . Off  Mobic ;  add tramadol 50 bid  And use norcoe only for breakthrough. Norco 10 and can give percoset if needed ; recheck uric acid on follow-up    7. Severe neuropathy pain: better small dose cymbalta 30 mg , patient is dizzy --stop neurontin ; may go up on cymbalta to 60 if needed     8. Remeron for sleep is working     9.Palliative care options on follow up           follow-up 3 - months

## 2020-12-02 NOTE — PATIENT INSTRUCTIONS
1.   Chronic kidney disease stage III:;     Much improved off Lasix colchicine and Mobic.      2.  Small size kidneys/atrophic kidney on the left side:no renal artery stenosis     3. Vit D def:+ sec HPT: on weekly Vit D +  calcitriol 0.25 mcg MWF :  Calcium and phosphorus are staying stable    4. Acidosis: on bicarbonate     5. Hyperkalemia: due to acidosis; off cozaar; off mobic  Patient is on  lokelma 5 G daily --stable     6. Gout + arthritis :  allopurinol 300 mg . Off  Mobic ;  add tramadol 50 bid  And use norcoe only for breakthrough. Norco 10 and can give percoset if needed ; recheck uric acid on follow-up    7. Severe neuropathy pain: better small dose cymbalta 30 mg , patient is dizzy --stop neurontin ; may go up on cymbalta to 60 if needed     8. Remeron for sleep is working     9.Palliative care options on follow up

## 2020-12-04 NOTE — DISCHARGE SUMMARY
[facility] Hospital Medicine  Discharge Summary      Patient Name: Violet Swenson  MRN: [id number]  Admission Date: 11/27/2020  Hospital Length of Stay: 0 days  Discharge Date and Time: 11/30/2020  6:25 PM  Attending Physician: No att. providers found   Discharging Provider: Lorne Horvath MD  Primary Care Provider: Marti Coronel MD      HPI:    80 year old woman with history of anemia , anxiety , Afib, CHF, GERD, HTN, CAD s/p PTCA, CKD stage 3 , and stroke who presents to the Emergency Department for CP which onset gradually yesterday at around 4pm. Chest pain was described as sharp, non radiating , associated with nausea ,9/10 in intensity at the initial period . The pain resolved with nitro. She had a repeat episode of chest pain this morning . She was seen in the Cardiology clinic by Dr Gil and was advised to come to the ED for further evaluation.  Prior Tx includes ASA and NTG paste with some relief.   Previous imaging test and lab test reviewed -  11/2020- ECHO-  · There is left ventricular concentric hypertrophy.  · With normal right ventricular systolic function.  · With normal systolic function. The estimated ejection fraction is 55%.  · Grade II diastolic dysfunction.  EKG showed- Normal sinus rhythm  Right axis deviation  · Nonspecific intraventricular   Troponin -  Component      Latest Ref Rng & Units 11/27/2020 11/27/2020           6:25 PM  3:23 PM   Troponin I      0.000 - 0.026 ng/mL 0.022 0.011   She will be placed on observation .    * No surgery found *      Hospital Course:   79 y/o wf admitted with a dx of unstable angina , uncontrolled HTN and CAD . Cardiology was consulted and recs C  This Monday , The chest pain has resolved . The cardiac enzymes are negative x 3 . The CXR  diod not show any acute finding . The BP  better control now . She is on BB , ASA ,plavix and statin .  11/29 She denies any complaint except for HA . There was no acute event  overnight. She is schedule  For a [facility] tomorrow  .   11/30 Pt was seen and examined at bedside . She was determined to be suitable for d/c   -She had a cardiac stress test which show :There is no evidence of myocardial ischemia or infarction  -The case was d/w cardiology and agree to d/c home   -There was no significant event since admission . 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    Specialty: Family Medicine  Contact information:  [address] [place] [address] [phone]             Nna Gil MD In 2 weeks.    Specialties: Cardiology, INTERVENTIONAL CARDIOLOGY  Contact information:  [address]  [place] [address] [phone]                 Patient Instructions:      Diet Cardiac     Notify your health care provider if you experience any of the following:  temperature  >100.4     Notify your health care provider if you experience any of the following:  persistent nausea and vomiting or diarrhea     Notify your health care provider if you experience any of the following:  severe uncontrolled pain     Notify your health care provider if you experience any of the following:  redness, tenderness, or signs of infection (pain, swelling, redness, odor or green/yellow discharge around incision site)     Notify your health care provider if you experience any of the following:  difficulty breathing or increased cough     Notify your health care provider if you experience any of the following:  severe persistent headache     Notify your health care provider if you experience any of the following:  worsening rash     Notify your health care provider if you experience any of the following:  persistent dizziness, light-headedness, or visual disturbances     Notify your health care provider if you experience any of the following:  increased confusion or weakness     Notify your health care provider if you experience any of the following:     Activity as tolerated       Significant Diagnostic Studies: Labs: BMP: No results for input(s): GLU, NA, K, CL, CO2, BUN, CREATININE, CALCIUM, MG in the last 48 hours., CMP No results for input(s): NA, K, CL, CO2, GLU, BUN, CREATININE, CALCIUM, PROT, ALBUMIN, BILITOT, ALKPHOS, AST, ALT, ANIONGAP, ESTGFRAFRICA, EGFRNONAA in the last 48 hours. and CBC No results for input(s): WBC, HGB, HCT, PLT in the last 48 hours.  Microbiology: Blood Culture No results found for: LABBLOO    Pending Diagnostic Studies:     Procedure Component Value Units Date/Time    Basic metabolic panel [[id number]] Collected: 11/29/20 0904    Order Status: Sent Lab Status: In process Updated: 11/29/20 0904    Specimen: Blood          Medications:  Reconciled Home Medications:      Medication List      CONTINUE taking these medications    aspirin 81 MG EC tablet  Commonly known as:  ECOTRIN  Take 81 mg by mouth nightly.     busPIRone 7.5 MG tablet  Commonly known as: BUSPAR  TAKE ONE TABLET BY MOUTH THREE TIMES DAILY     calcitRIOL 0.25 MCG Cap  Commonly known as: ROCALTROL  TAKE ONE CAPSULE BY MOUTH EVERY MONDAY, WEDNESDAY AND FRIDAY     clopidogreL 75 mg tablet  Commonly known as: PLAVIX  Take 1 tablet (75 mg total) by mouth once daily.     diclofenac sodium 1 % Gel  Commonly known as: VOLTAREN  Apply 2 g topically once daily. Apply 2 g over painful joints once or twice a day.     DULoxetine 30 MG capsule  Commonly known as: CYMBALTA  Take 1 capsule (30 mg total) by mouth once daily.     * ergocalciferol (vitamin D2) 10 mcg (400 unit) Tab  Take by mouth.     * VITAMIN D2 50,000 unit Cap  Generic drug: ergocalciferol  Take 1 capsule (50,000 Units total) by mouth every 7 days.     FLUAD QUAD 2020-21(65Y UP)(PF) 60 mcg (15 mcg x 4)/0.5 mL Syrg  Generic drug: flu vac 2020 65up-kanKA96X(PF)  Inject 0.5 mLs into the muscle.     fluocinolone 0.01 % external solution  Commonly known as: SYNALAR  AAA of scalp twice a day prn itching.     fluticasone propionate 50 mcg/actuation nasal spray  Commonly known as: FLONASE  2 sprays (100 mcg total) by Each Nare route once daily.     HYDROcodone-acetaminophen  mg per tablet  Commonly known as: NORCO  Take 1 tablet by mouth every 6 (six) hours as needed for Pain.     iron-vitamin C 100-250 mg (ICAR-C) 100-250 mg Tab  TAKE ONE TABLET BY MOUTH EVERY DAY     levocetirizine 5 MG tablet  Commonly known as: XYZAL  Take 1 tablet (5 mg total) by mouth every evening.     levothyroxine 75 MCG tablet  Commonly known as: SYNTHROID  TAKE ONE TABLET BY MOUTH EVERY DAY BEFORE BREAKFAST     mirtazapine 15 MG disintegrating tablet  Commonly known as: REMERON SOL-TAB  DISSOLVE ONE TABLET BY MOUTH NIGHTLY     ONE DAILY MULTIVITAMIN per tablet  Generic drug: multivitamin  Take 1 tablet by mouth once daily.     pravastatin 40 MG tablet  Commonly known as: PRAVACHOL  TAKE ONE  TABLET BY MOUTH ONCE DAILY     ranitidine 150 MG capsule  Commonly known as: ZANTAC  Take 150 mg by mouth nightly.     sertraline 100 MG tablet  Commonly known as: ZOLOFT  TAKE 1 & 1/2 TABLETS BY MOUTH EVERY DAY     sodium bicarbonate 650 MG tablet  TAKE ONE TABLET BY MOUTH TWICE DAILY     sodium zirconium cyclosilicate 5 gram packet  Commonly known as: LOKELMA  Take 1 packet (5 g total) by mouth once daily. Mix entire contents of packet(s) into drinking glass containing >3 tablespoons of water; stir well and drink immediately. If powder remains in the drinking glass, add water and repeat until no powder remains to ensure entire dose is taken.     traMADoL 50 mg tablet  Commonly known as: ULTRAM  Take 1 tablet (50 mg total) by mouth every 12 (twelve) hours as needed for Pain.     tretinoin 0.025 % cream  Commonly known as: RETIN-A  Apply a pea-sized amount to the entire face at bedtime.  Use every third night and increase as tolerated to nightly.     triamcinolone acetonide 0.025% 0.025 % cream  Commonly known as: KENALOG  Apply topically 2 (two) times daily. PRN itching.     VITAMIN C 100 MG tablet  Generic drug: ascorbic acid (vitamin C)  Take by mouth once daily.     ZINC ACETATE ORAL  Take 250 mg by mouth once daily.         * This list has 2 medication(s) that are the same as other medications prescribed for you. Read the directions carefully, and ask your doctor or other care provider to review them with you.            STOP taking these medications    gabapentin 300 MG capsule  Commonly known as: NEURONTIN     meloxicam 7.5 MG tablet  Commonly known as: MOBIC            Indwelling Lines/Drains at time of discharge:   Lines/Drains/Airways     Central Venous Catheter Line                 Port A Cath Single Lumen right subclavian -- days                Time spent on the discharge of patient: 35 minutes  Patient was seen and examined on the date of discharge and determined to be suitable for discharge.          Lorne Horvath MD  Department of Hospital Medicine  [facility]

## 2020-12-07 ENCOUNTER — LAB VISIT (OUTPATIENT)
Dept: LAB | Facility: HOSPITAL | Age: 80
End: 2020-12-07
Attending: INTERNAL MEDICINE
Payer: MEDICARE

## 2020-12-07 DIAGNOSIS — C85.83 LARGE CELL LYMPHOMA OF INTRA-ABDOMINAL LYMPH NODES: ICD-10-CM

## 2020-12-07 LAB
BASOPHILS # BLD AUTO: 0.03 K/UL (ref 0–0.2)
BASOPHILS NFR BLD: 0.4 % (ref 0–1.9)
DIFFERENTIAL METHOD: ABNORMAL
EOSINOPHIL # BLD AUTO: 0.2 K/UL (ref 0–0.5)
EOSINOPHIL NFR BLD: 2.5 % (ref 0–8)
ERYTHROCYTE [DISTWIDTH] IN BLOOD BY AUTOMATED COUNT: 17.9 % (ref 11.5–14.5)
HCT VFR BLD AUTO: 31.2 % (ref 37–48.5)
HGB BLD-MCNC: 9.6 G/DL (ref 12–16)
IMM GRANULOCYTES # BLD AUTO: 0.07 K/UL (ref 0–0.04)
IMM GRANULOCYTES NFR BLD AUTO: 0.9 % (ref 0–0.5)
LYMPHOCYTES # BLD AUTO: 2.7 K/UL (ref 1–4.8)
LYMPHOCYTES NFR BLD: 33.5 % (ref 18–48)
MCH RBC QN AUTO: 32 PG (ref 27–31)
MCHC RBC AUTO-ENTMCNC: 30.8 G/DL (ref 32–36)
MCV RBC AUTO: 104 FL (ref 82–98)
MONOCYTES # BLD AUTO: 0.5 K/UL (ref 0.3–1)
MONOCYTES NFR BLD: 6.1 % (ref 4–15)
NEUTROPHILS # BLD AUTO: 4.6 K/UL (ref 1.8–7.7)
NEUTROPHILS NFR BLD: 56.6 % (ref 38–73)
NRBC BLD-RTO: 0 /100 WBC
PLATELET # BLD AUTO: 217 K/UL (ref 150–350)
PMV BLD AUTO: 8.8 FL (ref 9.2–12.9)
RBC # BLD AUTO: 3 M/UL (ref 4–5.4)
WBC # BLD AUTO: 8.15 K/UL (ref 3.9–12.7)

## 2020-12-07 PROCEDURE — 84165 PROTEIN E-PHORESIS SERUM: CPT | Mod: 26,HCNC,, | Performed by: PATHOLOGY

## 2020-12-07 PROCEDURE — 84165 PROTEIN E-PHORESIS SERUM: CPT | Mod: HCNC

## 2020-12-07 PROCEDURE — 84165 PATHOLOGIST INTERPRETATION SPE: ICD-10-PCS | Mod: 26,HCNC,, | Performed by: PATHOLOGY

## 2020-12-07 PROCEDURE — 85025 COMPLETE CBC W/AUTO DIFF WBC: CPT | Mod: HCNC

## 2020-12-07 PROCEDURE — 83540 ASSAY OF IRON: CPT | Mod: HCNC

## 2020-12-07 PROCEDURE — 36415 COLL VENOUS BLD VENIPUNCTURE: CPT | Mod: HCNC

## 2020-12-07 PROCEDURE — 82728 ASSAY OF FERRITIN: CPT | Mod: HCNC

## 2020-12-08 LAB
ALBUMIN SERPL ELPH-MCNC: 3.07 G/DL (ref 3.35–5.55)
ALPHA1 GLOB SERPL ELPH-MCNC: 0.52 G/DL (ref 0.17–0.41)
ALPHA2 GLOB SERPL ELPH-MCNC: 0.81 G/DL (ref 0.43–0.99)
B-GLOBULIN SERPL ELPH-MCNC: 0.66 G/DL (ref 0.5–1.1)
FERRITIN SERPL-MCNC: 141 NG/ML (ref 20–300)
GAMMA GLOB SERPL ELPH-MCNC: 1.02 G/DL (ref 0.67–1.58)
IRON SERPL-MCNC: 72 UG/DL (ref 30–160)
PATHOLOGIST INTERPRETATION SPE: NORMAL
PROT SERPL-MCNC: 6.1 G/DL (ref 6–8.4)
SATURATED IRON: 21 % (ref 20–50)
TOTAL IRON BINDING CAPACITY: 345 UG/DL (ref 250–450)
TRANSFERRIN SERPL-MCNC: 233 MG/DL (ref 200–375)

## 2020-12-09 ENCOUNTER — OFFICE VISIT (OUTPATIENT)
Dept: HEMATOLOGY/ONCOLOGY | Facility: CLINIC | Age: 80
End: 2020-12-09
Payer: MEDICARE

## 2020-12-09 ENCOUNTER — PES CALL (OUTPATIENT)
Dept: ADMINISTRATIVE | Facility: CLINIC | Age: 80
End: 2020-12-09

## 2020-12-09 ENCOUNTER — TELEPHONE (OUTPATIENT)
Dept: PALLIATIVE MEDICINE | Facility: CLINIC | Age: 80
End: 2020-12-09

## 2020-12-09 VITALS
HEIGHT: 64 IN | RESPIRATION RATE: 14 BRPM | WEIGHT: 136.88 LBS | HEART RATE: 84 BPM | SYSTOLIC BLOOD PRESSURE: 163 MMHG | TEMPERATURE: 98 F | DIASTOLIC BLOOD PRESSURE: 99 MMHG | OXYGEN SATURATION: 100 % | BODY MASS INDEX: 23.37 KG/M2

## 2020-12-09 DIAGNOSIS — C85.83 LARGE CELL LYMPHOMA OF INTRA-ABDOMINAL LYMPH NODES: Primary | ICD-10-CM

## 2020-12-09 PROCEDURE — 99999 PR PBB SHADOW E&M-EST. PATIENT-LVL V: CPT | Mod: PBBFAC,HCNC,, | Performed by: INTERNAL MEDICINE

## 2020-12-09 PROCEDURE — 1100F PR PT FALLS ASSESS DOC 2+ FALLS/FALL W/INJURY/YR: ICD-10-PCS | Mod: HCNC,CPTII,S$GLB, | Performed by: INTERNAL MEDICINE

## 2020-12-09 PROCEDURE — 3077F PR MOST RECENT SYSTOLIC BLOOD PRESSURE >= 140 MM HG: ICD-10-PCS | Mod: HCNC,CPTII,S$GLB, | Performed by: INTERNAL MEDICINE

## 2020-12-09 PROCEDURE — 99214 PR OFFICE/OUTPT VISIT, EST, LEVL IV, 30-39 MIN: ICD-10-PCS | Mod: HCNC,S$GLB,, | Performed by: INTERNAL MEDICINE

## 2020-12-09 PROCEDURE — 3080F PR MOST RECENT DIASTOLIC BLOOD PRESSURE >= 90 MM HG: ICD-10-PCS | Mod: HCNC,CPTII,S$GLB, | Performed by: INTERNAL MEDICINE

## 2020-12-09 PROCEDURE — 1157F PR ADVANCE CARE PLAN OR EQUIV PRESENT IN MEDICAL RECORD: ICD-10-PCS | Mod: HCNC,S$GLB,, | Performed by: INTERNAL MEDICINE

## 2020-12-09 PROCEDURE — 1157F ADVNC CARE PLAN IN RCRD: CPT | Mod: HCNC,S$GLB,, | Performed by: INTERNAL MEDICINE

## 2020-12-09 PROCEDURE — 99214 OFFICE O/P EST MOD 30 MIN: CPT | Mod: HCNC,S$GLB,, | Performed by: INTERNAL MEDICINE

## 2020-12-09 PROCEDURE — 1100F PTFALLS ASSESS-DOCD GE2>/YR: CPT | Mod: HCNC,CPTII,S$GLB, | Performed by: INTERNAL MEDICINE

## 2020-12-09 PROCEDURE — 3080F DIAST BP >= 90 MM HG: CPT | Mod: HCNC,CPTII,S$GLB, | Performed by: INTERNAL MEDICINE

## 2020-12-09 PROCEDURE — 99499 RISK ADDL DX/OHS AUDIT: ICD-10-PCS | Mod: S$GLB,,, | Performed by: INTERNAL MEDICINE

## 2020-12-09 PROCEDURE — 3288F FALL RISK ASSESSMENT DOCD: CPT | Mod: HCNC,CPTII,S$GLB, | Performed by: INTERNAL MEDICINE

## 2020-12-09 PROCEDURE — 1159F MED LIST DOCD IN RCRD: CPT | Mod: HCNC,S$GLB,, | Performed by: INTERNAL MEDICINE

## 2020-12-09 PROCEDURE — 3077F SYST BP >= 140 MM HG: CPT | Mod: HCNC,CPTII,S$GLB, | Performed by: INTERNAL MEDICINE

## 2020-12-09 PROCEDURE — 1126F AMNT PAIN NOTED NONE PRSNT: CPT | Mod: HCNC,S$GLB,, | Performed by: INTERNAL MEDICINE

## 2020-12-09 PROCEDURE — 99999 PR PBB SHADOW E&M-EST. PATIENT-LVL V: ICD-10-PCS | Mod: PBBFAC,HCNC,, | Performed by: INTERNAL MEDICINE

## 2020-12-09 PROCEDURE — 1126F PR PAIN SEVERITY QUANTIFIED, NO PAIN PRESENT: ICD-10-PCS | Mod: HCNC,S$GLB,, | Performed by: INTERNAL MEDICINE

## 2020-12-09 PROCEDURE — 1159F PR MEDICATION LIST DOCUMENTED IN MEDICAL RECORD: ICD-10-PCS | Mod: HCNC,S$GLB,, | Performed by: INTERNAL MEDICINE

## 2020-12-09 PROCEDURE — 99499 UNLISTED E&M SERVICE: CPT | Mod: S$GLB,,, | Performed by: INTERNAL MEDICINE

## 2020-12-09 PROCEDURE — 3288F PR FALLS RISK ASSESSMENT DOCUMENTED: ICD-10-PCS | Mod: HCNC,CPTII,S$GLB, | Performed by: INTERNAL MEDICINE

## 2020-12-09 RX ORDER — ALLOPURINOL 300 MG/1
300 TABLET ORAL DAILY
COMMUNITY
Start: 2020-11-24 | End: 2021-01-26

## 2020-12-09 NOTE — TELEPHONE ENCOUNTER
Nurse received a referral for a palliative care visit. Called to set up with ms simon, no answer, LVM to call office

## 2020-12-09 NOTE — PROGRESS NOTES
Subjective:       Patient ID: Violet Swenson is a 80 y.o. female.    Chief Complaint: Follow-up    HPI This 80 year old  lady  comes for follow up of her  diffuse large cell lymphoma.   A CT of   the abdomen done on 04/05/2017, done because of abdominal pain ,  was reported as showing a 6.9 x 7.0 x 8.4 cm soft  tissue mass in the right lower quadrant in the terminal ileum abutting the   cecum. There was adjacent conglomerate adenopathy in the right lower quadrant   mesentery.     The patient had a colonoscopy that showed a lesion in the terminal ileum.     She underwent a right hemicolectomy and terminal ileum removal. 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  She had a Ct/PET that showed evidence of surgery and some non specific findings, but no evidence of active disease elsewhere.  An ECHO showed normal cardiac ejection fraction, as well as a MUGA.  She was seen cardiology and cleared for ADRIAMYCIN administration.  She was negative for Hep B/C and HIV  Bone marrow was negative.     The patient started  R-CHOP. Treatment  She tolerated the treatment with some minor difficulties. After 3 cycles, she had a repeat CT/PET  There was no activity in the abdomen.  There was development of ground glass opacities/infiltrates in both lungs which were hypermetabolic although the SUV was not reported.  We discussed the imaging studies with Radiology and Pulmonary.  Dr Corral of the Pulmonary Department favored the findings to be due to pulmonary congestion.  Her troponin was  normal, but her BNP was markedly elevated at 1,103.  She was started on Lasix by dr Corral and asked to have a  Ct in few weeks  The patient   had evaluation by Cardiology ( dr Gil).It was felt had developed CHF., with a decrease in her ejection traction to 47%., elevated BNP and imaging studies.  The patient indicated her leg  edema and SOB   improved on lasix. Repeat PET showed  clearance of all the infiltrates  Since, she has had a complete work including cardiac catheterization with negative findings.  A She comes for follow up. She had a CT/PET in 2018 that shows no evidence of recurrence. Repeat CT/PET 2018  A 2019 , and 2029 showed also no evidence of recurrence     Follwing her chemotherapy she has had a macrocytic anemia with normal b12/folate levels.       Says that in the last few weeks she has had admissons to the hospital because of slurred speech felt to have casey due to a stroke ( no confirmation due to having a pacemaker and not been able to have a MRI), and chest pains which might have been angina,.  Nephrology stopped her colchicine, allopurinol, lasix and meloxicam  ALLERGIES:see med card     MEDICATIONS: See MedCard.     PREVIOUS SURGERIES: Appendectomy in , tonsillectomy at age 19, gastric   bypass with incidental cholecystectomy, bilateral knee replacement in .     SOCIAL HISTORY: She is . She had two natural children, and one adopted   one. The adopted child has . She lives in [place] with her   . She smoked for two years, averaging a pack a day. She stopped 35   years ago or so. Denies any alcohol intake. She worked in [employer].     FAMILY HISTORY: Father  of colon cancer. Younger brother had leukemia that  transform into a lymphoma. Sister had pancreatic cancer       Review of Systems   Constitutional: Negative.    HENT: Negative.    Eyes: Negative.    Respiratory: Negative.  Negative for cough and wheezing.    Cardiovascular: Negative.  Negative for chest pain.   Gastrointestinal: Negative.    Genitourinary: Negative.    Neurological: Negative.    Psychiatric/Behavioral: Negative.          Objective:      Physical Exam  Constitutional:       General: She is not in acute distress.     Appearance: Normal appearance. She is well-developed. She is not diaphoretic.   HENT:      Head: Normocephalic.      Left Ear: External  ear normal.      Nose: Nose normal.      Right Sinus: No maxillary sinus tenderness or frontal sinus tenderness.      Left Sinus: No maxillary sinus tenderness or frontal sinus tenderness.   Eyes:      General: Lids are normal.      Conjunctiva/sclera: Conjunctivae normal.      Pupils: Pupils are equal, round, and reactive to light.   Neck:      Thyroid: No thyroid mass or thyromegaly.      Vascular: Normal carotid pulses. No carotid bruit, hepatojugular reflux or JVD.      Trachea: No tracheal deviation.   Cardiovascular:      Rate and Rhythm: Normal rate and regular rhythm.      Heart sounds: Normal heart sounds, S1 normal and S2 normal. No murmur. No friction rub. No gallop.       Comments: Carotid exam normal  Pulmonary:      Effort: Pulmonary effort is normal. No accessory muscle usage or respiratory distress.      Breath sounds: Normal breath sounds. No wheezing or rales.   Chest:      Chest wall: No tenderness.   Abdominal:      General: Abdomen is flat. There is no distension.      Palpations: Abdomen is soft. There is no hepatomegaly, splenomegaly or mass.      Tenderness: There is no abdominal tenderness. There is no guarding or rebound.   Musculoskeletal: Normal range of motion.         General: No tenderness.      Right hand: Normal.      Left hand: Normal.      Comments:     Lymphadenopathy:      Cervical: No cervical adenopathy.      Upper Body:      Right upper body: No supraclavicular adenopathy.      Left upper body: No supraclavicular adenopathy.   Skin:     General: Skin is warm and dry.      Coloration: Skin is not pale.      Findings: No erythema or rash.      Nails: There is no clubbing.     Neurological:      Mental Status: She is alert and oriented to person, place, and time.      Cranial Nerves: No cranial nerve deficit.      Coordination: Coordination normal.   Psychiatric:         Mood and Affect: Mood normal.         Behavior: Behavior normal.         Thought Content: Thought content  normal.         Judgment: Judgment normal.         Wt Readings from Last 3 Encounters:   12/09/20 62.1 kg (136 lb 14.5 oz)   12/02/20 62.6 kg (138 lb 0.1 oz)   12/01/20 60.7 kg (133 lb 14.9 oz)     Temp Readings from Last 3 Encounters:   12/09/20 98.3 °F (36.8 °C)   12/01/20 97.5 °F (36.4 °C) (Temporal)   11/30/20 97.4 °F (36.3 °C) (Axillary)     BP Readings from Last 3 Encounters:   12/09/20 (!) 163/99   12/02/20 106/60   12/01/20 90/60     Pulse Readings from Last 3 Encounters:   12/09/20 84   12/02/20 80   12/01/20 96       Assessment:       1. 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  We will continue to monitor. See us back in 3 months with a cbc/cmp.

## 2020-12-11 ENCOUNTER — PATIENT MESSAGE (OUTPATIENT)
Dept: OTHER | Facility: OTHER | Age: 80
End: 2020-12-11

## 2020-12-14 ENCOUNTER — OFFICE VISIT (OUTPATIENT)
Dept: CARDIOLOGY | Facility: CLINIC | Age: 80
End: 2020-12-14
Payer: MEDICARE

## 2020-12-14 VITALS
BODY MASS INDEX: 23.06 KG/M2 | HEART RATE: 98 BPM | DIASTOLIC BLOOD PRESSURE: 82 MMHG | OXYGEN SATURATION: 99 % | SYSTOLIC BLOOD PRESSURE: 132 MMHG | WEIGHT: 134.38 LBS

## 2020-12-14 DIAGNOSIS — T45.1X5A CHEMOTHERAPY-INDUCED NEUROPATHY: Chronic | ICD-10-CM

## 2020-12-14 DIAGNOSIS — R79.89 ABNORMAL LFTS (LIVER FUNCTION TESTS): ICD-10-CM

## 2020-12-14 DIAGNOSIS — I25.10 CORONARY ARTERY DISEASE INVOLVING NATIVE CORONARY ARTERY OF NATIVE HEART WITHOUT ANGINA PECTORIS: Primary | Chronic | ICD-10-CM

## 2020-12-14 DIAGNOSIS — Z96.653 S/P TKR (TOTAL KNEE REPLACEMENT), BILATERAL: Chronic | ICD-10-CM

## 2020-12-14 DIAGNOSIS — N18.30 STAGE 3 CHRONIC KIDNEY DISEASE, UNSPECIFIED WHETHER STAGE 3A OR 3B CKD: Chronic | ICD-10-CM

## 2020-12-14 DIAGNOSIS — I70.0 CALCIFICATION OF AORTA: Chronic | ICD-10-CM

## 2020-12-14 DIAGNOSIS — I10 ESSENTIAL HYPERTENSION: Chronic | ICD-10-CM

## 2020-12-14 DIAGNOSIS — M79.2 NEUROPATHIC PAIN OF RIGHT FOOT: ICD-10-CM

## 2020-12-14 DIAGNOSIS — F32.9 MAJOR DEPRESSION, CHRONIC: Chronic | ICD-10-CM

## 2020-12-14 DIAGNOSIS — G62.0 CHEMOTHERAPY-INDUCED NEUROPATHY: Chronic | ICD-10-CM

## 2020-12-14 DIAGNOSIS — R94.2 DIFFUSION CAPACITY OF LUNG (DL), DECREASED: ICD-10-CM

## 2020-12-14 DIAGNOSIS — I48.0 PAROXYSMAL ATRIAL FIBRILLATION: Chronic | ICD-10-CM

## 2020-12-14 DIAGNOSIS — M65.842 STENOSING TENOSYNOVITIS OF FINGER OF LEFT HAND: ICD-10-CM

## 2020-12-14 DIAGNOSIS — M1A.09X0 IDIOPATHIC CHRONIC GOUT OF MULTIPLE SITES WITHOUT TOPHUS: Chronic | ICD-10-CM

## 2020-12-14 DIAGNOSIS — F51.05 INSOMNIA SECONDARY TO ANXIETY: ICD-10-CM

## 2020-12-14 DIAGNOSIS — C85.83 LARGE CELL LYMPHOMA OF INTRA-ABDOMINAL LYMPH NODES: Chronic | ICD-10-CM

## 2020-12-14 DIAGNOSIS — F41.9 INSOMNIA SECONDARY TO ANXIETY: ICD-10-CM

## 2020-12-14 DIAGNOSIS — Z95.0 PACEMAKER: Chronic | ICD-10-CM

## 2020-12-14 DIAGNOSIS — M15.9 PRIMARY OSTEOARTHRITIS INVOLVING MULTIPLE JOINTS: Chronic | ICD-10-CM

## 2020-12-14 DIAGNOSIS — G89.29 CHRONIC MIDLINE LOW BACK PAIN WITH RIGHT-SIDED SCIATICA: ICD-10-CM

## 2020-12-14 DIAGNOSIS — M54.41 CHRONIC MIDLINE LOW BACK PAIN WITH RIGHT-SIDED SCIATICA: ICD-10-CM

## 2020-12-14 DIAGNOSIS — E03.9 HYPOTHYROIDISM (ACQUIRED): Chronic | ICD-10-CM

## 2020-12-14 DIAGNOSIS — Z98.84 S/P GASTRIC BYPASS: Chronic | ICD-10-CM

## 2020-12-14 DIAGNOSIS — E78.2 MIXED HYPERLIPIDEMIA: Chronic | ICD-10-CM

## 2020-12-14 DIAGNOSIS — D64.9 CHRONIC ANEMIA: Chronic | ICD-10-CM

## 2020-12-14 DIAGNOSIS — M53.3 SACROILIAC JOINT DYSFUNCTION: ICD-10-CM

## 2020-12-14 DIAGNOSIS — I25.5 ISCHEMIC CARDIOMYOPATHY: Chronic | ICD-10-CM

## 2020-12-14 PROCEDURE — 1159F MED LIST DOCD IN RCRD: CPT | Mod: HCNC,S$GLB,, | Performed by: INTERNAL MEDICINE

## 2020-12-14 PROCEDURE — 1157F PR ADVANCE CARE PLAN OR EQUIV PRESENT IN MEDICAL RECORD: ICD-10-PCS | Mod: HCNC,S$GLB,, | Performed by: INTERNAL MEDICINE

## 2020-12-14 PROCEDURE — 99999 PR PBB SHADOW E&M-EST. PATIENT-LVL IV: CPT | Mod: PBBFAC,HCNC,, | Performed by: INTERNAL MEDICINE

## 2020-12-14 PROCEDURE — 99999 PR PBB SHADOW E&M-EST. PATIENT-LVL IV: ICD-10-PCS | Mod: PBBFAC,HCNC,, | Performed by: INTERNAL MEDICINE

## 2020-12-14 PROCEDURE — 3075F PR MOST RECENT SYSTOLIC BLOOD PRESS GE 130-139MM HG: ICD-10-PCS | Mod: HCNC,CPTII,S$GLB, | Performed by: INTERNAL MEDICINE

## 2020-12-14 PROCEDURE — 3075F SYST BP GE 130 - 139MM HG: CPT | Mod: HCNC,CPTII,S$GLB, | Performed by: INTERNAL MEDICINE

## 2020-12-14 PROCEDURE — 3079F DIAST BP 80-89 MM HG: CPT | Mod: HCNC,CPTII,S$GLB, | Performed by: INTERNAL MEDICINE

## 2020-12-14 PROCEDURE — 3079F PR MOST RECENT DIASTOLIC BLOOD PRESSURE 80-89 MM HG: ICD-10-PCS | Mod: HCNC,CPTII,S$GLB, | Performed by: INTERNAL MEDICINE

## 2020-12-14 PROCEDURE — 1159F PR MEDICATION LIST DOCUMENTED IN MEDICAL RECORD: ICD-10-PCS | Mod: HCNC,S$GLB,, | Performed by: INTERNAL MEDICINE

## 2020-12-14 PROCEDURE — 99214 OFFICE O/P EST MOD 30 MIN: CPT | Mod: HCNC,S$GLB,, | Performed by: INTERNAL MEDICINE

## 2020-12-14 PROCEDURE — 1157F ADVNC CARE PLAN IN RCRD: CPT | Mod: HCNC,S$GLB,, | Performed by: INTERNAL MEDICINE

## 2020-12-14 PROCEDURE — 99214 PR OFFICE/OUTPT VISIT, EST, LEVL IV, 30-39 MIN: ICD-10-PCS | Mod: HCNC,S$GLB,, | Performed by: INTERNAL MEDICINE

## 2020-12-14 NOTE — PROGRESS NOTES
"Subjective:   Patient ID:  Violet Swenson is a 80 y.o. female who presents for follow up of No chief complaint on file.      HPI  9/17/2020   79 yo female with ;ymphoma  S/p pcae cad s/p stent ckd heart failure ios here for f/u she has low back pain limited has had recurrent episodes of intrascapular pain radiating to left arm feels like muscle spasm none with exertion. No chf symptoms no leg swelling tia claudication chest pain syncope near syncope/.no palpitation. Has some improvement in appetite  .  11/16  Violet Swenson is a 80 y.o. female patient with a h/o anemia, anxiety, atrial flutter, lymphoma large cell B, CHF, CAD, depression, GERD, gout, heart failure, HLD, HTN, hypothyroidism kidney disease, lung nodule, obesity, metronic pacemaker, polyneuropathy, who presents to the Emergency Department for evaluation of fatigue which onset this morning. Per [facility], the pt has been intermittently stuttering her speech and was hypoglycemic upon arriving on scene. Pt's daughter states that the pt woke up and was chatting to her before she left the house around 0800 this morning. The pt reports falling back asleep and waking up around 0930 feeling very weak and fatigued. She states, "when I rolled over after waking up, it felt like someone was pushing me back in bed." Associated sxs include R arm weakness, generalized weakness, slurred speech, and trouble ambulating. Patient denies any fever, SOB, CP, n/v, numbness, dizziness, and all other sxs at this time. In the ED, H/H 8.9/28.6, Creatinine 1.5, , TSH 4.087, CT head with no acute findings. Tele-stroke recommended MRI/MRA brain, MRA neck lipid panel, hemoglobin A1c. Add ASA to Plavix if x 30 days if no contraindication. Atorvastatin 40 mg daily. Neurology, PT/speech consults. Patient placed in observation for CVA rule out under the care of hospital medicine.      * No surgery found *       Hospital Course:   Place an observation for " evaluation and treatment of right arm weakness, slurred speech, and gait instability. Symptoms concerning for acute stroke. Initially perform a CT of the head which showed no acute findings. Patient was unable to perform an MRI or MRA due to having an implantable device. Empirically treated for transient ischemic attack versus stroke. Performed an interval CT scan of the head which was also negative. She was evaluated by neurology who assisted with management. Neurology determined that even though we were unable to get positive imaging findings that she should be treated as likely having had an acute stroke.  Physical and occupational therapy evaluated the patient and recommended outpatient therapy. Speech language pathology evaluated the patient and recommended no restrictions. Discharge plan to return home and continue medication plan outpatient physical therapy and follow-up with primary care as needed         11/27/2020  Had chest pain since yesterday it is tight all over chest no associated shortness of breath. Her bp is elevated she cannot get comfortable feels like tightness . Has no leg swelling her speech and vision improved but he rrt sided weakness still evident she is taking her antiplatelet. She has not taken her meds this  Morning. She is not feeling good during the vist the pain has been ongoing since 4 pm yesterday. I gave her a s/l nitro and she improved.     12/14/2020    Here for f/u after hospital admit. She r/o for mi had cardiolite negative for ischemia. Her medical therapy was adjusted taken off gabapentin she has her aches back all over . She is not able to sleep at nite. No further cardiac symptomatology.  Past Medical History:   Diagnosis Date    Age-related osteoporosis without current pathological fracture 8/20/2018    Anemia     Anxiety     Arthritis     Atrial flutter     Cancer     lymphoma Large cell B    CHF (congestive heart failure)     Chronic anemia 4/26/2017    Chronic  midline low back pain with right-sided sciatica 8/20/2018    Coronary artery disease     01/2015 LHC patent LCX. 50% stenosis in LAD and RCA.      Depression     Disorder of kidney and ureter     Encounter for blood transfusion     GERD (gastroesophageal reflux disease)     Gout, arthritis     Heart failure     Hx of psychiatric care     Hyperlipidemia     Hypertension     Hypothyroidism     Immune deficiency disorder     Kidney disease     Lung nodule 2014    RML--stable    Obesity     Pacemaker     Metronic    Paroxysmal atrial fibrillation 3/15/2018    Pneumonia     Polyneuropathy     chemo induced    Psychiatric problem     Stroke 11/16/2020    Tobacco dependence     quit 1976    Trouble in sleeping     Unstable angina 11/27/2020       Past Surgical History:   Procedure Laterality Date    APPENDECTOMY  1966 approx    CARDIAC PACEMAKER PLACEMENT  01/22/2015    CHOLECYSTECTOMY  1993    incidental at time of gastric bypass    COLON SURGERY Right 2017    hemicolectomy    COLONOSCOPY N/A 4/6/2017    Procedure: COLONOSCOPY;  Surgeon: Tye Enamorado MD;  Location: [facility];  Service: Endoscopy;  Laterality: N/A;    COLONOSCOPY N/A 11/28/2018    Procedure: COLONOSCOPY;  Surgeon: Saúl Arthur III, MD;  Location: [facility];  Service: Endoscopy;  Laterality: N/A;    CORONARY ANGIOPLASTY  02/2014    CORONARY STENT PLACEMENT  02/05/2014    ESOPHAGOGASTRODUODENOSCOPY N/A 11/28/2018    Procedure: EGD (ESOPHAGOGASTRODUODENOSCOPY);  Surgeon: Saúl Arthur III, MD;  Location: [facility];  Service: Endoscopy;  Laterality: N/A;    GASTRIC BYPASS  1993    with incidental choly    HERNIA REPAIR      INJECTION OF ANESTHETIC AGENT INTO SACROILIAC JOINT Right 10/8/2020    Procedure: Right BLOCK, SACROILIAC JOINT with RN IV sedation;  Surgeon: Madi Anton MD;  Location: [facility];  Service: Pain Management;  Laterality: Right;    JOINT REPLACEMENT Bilateral 2009    3 months apart     "TONSILLECTOMY         Social History     Tobacco Use    Smoking status: Former Smoker     Packs/day: 2.00     Years: 6.00     Pack years: 12.00     Quit date: 3/15/1976     Years since quittin.7    Smokeless tobacco: Never Used   Substance Use Topics    Alcohol use: No     Alcohol/week: 0.0 standard drinks    Drug use: No       Family History   Problem Relation Age of Onset    Heart disease Mother     Hypertension Mother     Cataracts Mother     Stomach cancer Father         "ulcers that turned to cancer"    Cancer Father         stomach    Pancreatic cancer Sister     Cancer Sister         pancreatic    Leukemia Brother         "leukemia which led to intestinal cancer"    Cataracts Brother     Cancer Brother         leukemia then later stomach cancer    Drug abuse Son     Cancer Son         prostate cancer    Prostate cancer Son     COPD Daughter     Asthma Daughter     Hypertension Maternal Grandfather     Stroke Maternal Grandfather     Alcohol abuse Neg Hx     Diabetes Neg Hx     Mental retardation Neg Hx     Mental illness Neg Hx        Current Outpatient Medications   Medication Sig    allopurinoL (ZYLOPRIM) 300 MG tablet Take 300 mg by mouth once daily.    ascorbic acid, vitamin C, (VITAMIN C) 100 MG tablet Take by mouth once daily.    aspirin (ECOTRIN) 81 MG EC tablet Take 81 mg by mouth nightly.     busPIRone (BUSPAR) 7.5 MG tablet TAKE ONE TABLET BY MOUTH THREE TIMES DAILY    calcitRIOL (ROCALTROL) 0.25 MCG Cap TAKE ONE CAPSULE BY MOUTH EVERY MONDAY, WEDNESDAY AND FRIDAY    clopidogreL (PLAVIX) 75 mg tablet Take 1 tablet (75 mg total) by mouth once daily.    DULoxetine (CYMBALTA) 30 MG capsule Take 1 capsule (30 mg total) by mouth once daily.    ergocalciferol, vitamin D2, 400 unit Tab Take by mouth.    flu vac 2020 65up-jefFW22R,PF, (FLUAD QUAD -21,65Y UP,,PF,) 60 mcg (15 mcg x 4)/0.5 mL Syrg Inject 0.5 mLs into the muscle.    fluticasone propionate " (FLONASE) 50 mcg/actuation nasal spray 2 sprays (100 mcg total) by Each Nare route once daily.    iron-vitamin C 100-250 mg, ICAR-C, 100-250 mg Tab TAKE ONE TABLET BY MOUTH EVERY DAY    levocetirizine (XYZAL) 5 MG tablet Take 1 tablet (5 mg total) by mouth every evening.    levothyroxine (SYNTHROID) 75 MCG tablet TAKE ONE TABLET BY MOUTH EVERY DAY BEFORE BREAKFAST    metoprolol tartrate (LOPRESSOR) 25 MG tablet Take 0.5 tablets (12.5 mg total) by mouth 2 (two) times daily.    mirtazapine (REMERON SOL-TAB) 15 MG disintegrating tablet DISSOLVE ONE TABLET BY MOUTH NIGHTLY    multivitamin (ONE DAILY MULTIVITAMIN) per tablet Take 1 tablet by mouth once daily.    pravastatin (PRAVACHOL) 40 MG tablet TAKE ONE TABLET BY MOUTH ONCE DAILY    ranitidine (ZANTAC) 150 MG capsule Take 150 mg by mouth nightly.     sertraline (ZOLOFT) 100 MG tablet TAKE 1 & 1/2 TABLETS BY MOUTH EVERY DAY    sodium bicarbonate 650 MG tablet TAKE ONE TABLET BY MOUTH TWICE DAILY    sodium zirconium cyclosilicate (LOKELMA) 5 gram packet Take 1 packet (5 g total) by mouth once daily. Mix entire contents of packet(s) into drinking glass containing >3 tablespoons of water; stir well and drink immediately. If powder remains in the drinking glass, add water and repeat until no powder remains to ensure entire dose is taken.    traMADoL (ULTRAM) 50 mg tablet Take 1 tablet (50 mg total) by mouth every 12 (twelve) hours as needed for Pain.    VITAMIN D2 1,250 mcg (50,000 unit) capsule Take 1 capsule (50,000 Units total) by mouth every 7 days.    ZINC ACETATE ORAL Take 250 mg by mouth once daily.     diclofenac sodium 1 % Gel Apply 2 g topically once daily. Apply 2 g over painful joints once or twice a day.    fluocinolone (SYNALAR) 0.01 % external solution AAA of scalp twice a day prn itching.    HYDROcodone-acetaminophen (NORCO)  mg per tablet Take 1 tablet by mouth every 6 (six) hours as needed for Pain. (Patient not taking: Reported on  12/14/2020)    tretinoin (RETIN-A) 0.025 % cream Apply a pea-sized amount to the entire face at bedtime.  Use every third night and increase as tolerated to nightly.    triamcinolone acetonide 0.025% (KENALOG) 0.025 % cream Apply topically 2 (two) times daily. PRN itching.     Current Facility-Administered Medications   Medication    denosumab (PROLIA) injection 60 mg     Current Outpatient Medications on File Prior to Visit   Medication Sig    allopurinoL (ZYLOPRIM) 300 MG tablet Take 300 mg by mouth once daily.    ascorbic acid, vitamin C, (VITAMIN C) 100 MG tablet Take by mouth once daily.    aspirin (ECOTRIN) 81 MG EC tablet Take 81 mg by mouth nightly.     busPIRone (BUSPAR) 7.5 MG tablet TAKE ONE TABLET BY MOUTH THREE TIMES DAILY    calcitRIOL (ROCALTROL) 0.25 MCG Cap TAKE ONE CAPSULE BY MOUTH EVERY MONDAY, WEDNESDAY AND FRIDAY    clopidogreL (PLAVIX) 75 mg tablet Take 1 tablet (75 mg total) by mouth once daily.    DULoxetine (CYMBALTA) 30 MG capsule Take 1 capsule (30 mg total) by mouth once daily.    ergocalciferol, vitamin D2, 400 unit Tab Take by mouth.    flu vac 2020 65up-pbqEQ84B,PF, (FLUAD QUAD 2020-21,65Y UP,,PF,) 60 mcg (15 mcg x 4)/0.5 mL Syrg Inject 0.5 mLs into the muscle.    fluticasone propionate (FLONASE) 50 mcg/actuation nasal spray 2 sprays (100 mcg total) by Each Nare route once daily.    iron-vitamin C 100-250 mg, ICAR-C, 100-250 mg Tab TAKE ONE TABLET BY MOUTH EVERY DAY    levocetirizine (XYZAL) 5 MG tablet Take 1 tablet (5 mg total) by mouth every evening.    levothyroxine (SYNTHROID) 75 MCG tablet TAKE ONE TABLET BY MOUTH EVERY DAY BEFORE BREAKFAST    metoprolol tartrate (LOPRESSOR) 25 MG tablet Take 0.5 tablets (12.5 mg total) by mouth 2 (two) times daily.    mirtazapine (REMERON SOL-TAB) 15 MG disintegrating tablet DISSOLVE ONE TABLET BY MOUTH NIGHTLY    multivitamin (ONE DAILY MULTIVITAMIN) per tablet Take 1 tablet by mouth once daily.    pravastatin (PRAVACHOL) 40  MG tablet TAKE ONE TABLET BY MOUTH ONCE DAILY    ranitidine (ZANTAC) 150 MG capsule Take 150 mg by mouth nightly.     sertraline (ZOLOFT) 100 MG tablet TAKE 1 & 1/2 TABLETS BY MOUTH EVERY DAY    sodium bicarbonate 650 MG tablet TAKE ONE TABLET BY MOUTH TWICE DAILY    sodium zirconium cyclosilicate (LOKELMA) 5 gram packet Take 1 packet (5 g total) by mouth once daily. Mix entire contents of packet(s) into drinking glass containing >3 tablespoons of water; stir well and drink immediately. If powder remains in the drinking glass, add water and repeat until no powder remains to ensure entire dose is taken.    traMADoL (ULTRAM) 50 mg tablet Take 1 tablet (50 mg total) by mouth every 12 (twelve) hours as needed for Pain.    VITAMIN D2 1,250 mcg (50,000 unit) capsule Take 1 capsule (50,000 Units total) by mouth every 7 days.    ZINC ACETATE ORAL Take 250 mg by mouth once daily.     diclofenac sodium 1 % Gel Apply 2 g topically once daily. Apply 2 g over painful joints once or twice a day.    fluocinolone (SYNALAR) 0.01 % external solution AAA of scalp twice a day prn itching.    HYDROcodone-acetaminophen (NORCO)  mg per tablet Take 1 tablet by mouth every 6 (six) hours as needed for Pain. (Patient not taking: Reported on 12/14/2020)    tretinoin (RETIN-A) 0.025 % cream Apply a pea-sized amount to the entire face at bedtime.  Use every third night and increase as tolerated to nightly.    triamcinolone acetonide 0.025% (KENALOG) 0.025 % cream Apply topically 2 (two) times daily. PRN itching.     Current Facility-Administered Medications on File Prior to Visit   Medication    denosumab (PROLIA) injection 60 mg       Review of Systems   Constitution: Negative for diaphoresis, malaise/fatigue and weight gain.   HENT: Negative for hoarse voice.    Eyes: Negative for double vision and visual disturbance.   Cardiovascular: Negative for chest pain, claudication, cyanosis, dyspnea on exertion, irregular heartbeat,  leg swelling, near-syncope, orthopnea, palpitations, paroxysmal nocturnal dyspnea and syncope.   Respiratory: Negative for cough, hemoptysis, shortness of breath and snoring.    Hematologic/Lymphatic: Negative for bleeding problem. Does not bruise/bleed easily.   Skin: Negative for color change and poor wound healing.   Musculoskeletal: Positive for joint pain, myalgias and stiffness. Negative for muscle cramps and muscle weakness.   Gastrointestinal: Negative for bloating, abdominal pain, change in bowel habit, diarrhea, heartburn, hematemesis, hematochezia, melena and nausea.   Neurological: Positive for numbness and paresthesias. Negative for excessive daytime sleepiness, dizziness, headaches, light-headedness, loss of balance and weakness.   Psychiatric/Behavioral: Negative for memory loss. The patient does not have insomnia.    Allergic/Immunologic: Negative for hives.       Objective:   Physical Exam   Constitutional: She is oriented to person, place, and time. She appears well-developed and well-nourished. She does not appear ill. No distress.   HENT:   Head: Normocephalic and atraumatic.   Eyes: Pupils are equal, round, and reactive to light. EOM are normal. No scleral icterus.   Neck: Normal range of motion. Neck supple. Normal carotid pulses, no hepatojugular reflux and no JVD present. Carotid bruit is not present. No tracheal deviation present. No thyromegaly present.   Cardiovascular: Normal rate, regular rhythm, normal heart sounds and normal pulses. Exam reveals no gallop and no friction rub.   No murmur heard.  Pulmonary/Chest: Effort normal and breath sounds normal. No respiratory distress. She has no wheezes. She has no rhonchi. She has no rales. She exhibits no tenderness.   Pacer site well healed.   Abdominal: Soft. Normal appearance, normal aorta and bowel sounds are normal. She exhibits no abdominal bruit, no ascites and no pulsatile midline mass. There is no hepatomegaly. There is no abdominal  tenderness.   Musculoskeletal:         General: No edema.      Right shoulder: She exhibits no deformity.   Neurological: She is alert and oriented to person, place, and time. She has normal strength. No cranial nerve deficit. Coordination normal.   Skin: Skin is warm and dry. No rash noted. No cyanosis or erythema. Nails show no clubbing.   Psychiatric: She has a normal mood and affect. Her speech is normal and behavior is normal.     Vitals:    12/14/20 1516 12/14/20 1517   BP: 137/84 132/82   BP Location: Right arm Left arm   Patient Position: Sitting Sitting   BP Method: Medium (Manual) Medium (Manual)   Pulse: 98    SpO2: 99%    Weight: 61 kg (134 lb 5.9 oz)      Lab Results   Component Value Date    CHOL 101 (L) 11/16/2020    CHOL 118 (L) 09/14/2020    CHOL 133 08/25/2020     Lab Results   Component Value Date    HDL 42 11/16/2020    HDL 55 09/14/2020    HDL 55 08/25/2020     Lab Results   Component Value Date    LDLCALC 39.8 (L) 11/16/2020    LDLCALC 45.8 (L) 09/14/2020    LDLCALC 59.0 (L) 08/25/2020     Lab Results   Component Value Date    TRIG 96 11/16/2020    TRIG 86 09/14/2020    TRIG 95 08/25/2020     Lab Results   Component Value Date    CHOLHDL 41.6 11/16/2020    CHOLHDL 46.6 09/14/2020    CHOLHDL 41.4 08/25/2020       Chemistry        Component Value Date/Time     11/27/2020 1523    K 5.0 11/27/2020 1523     (H) 11/27/2020 1523    CO2 21 (L) 11/27/2020 1523    BUN 20 11/27/2020 1523    CREATININE 1.3 11/27/2020 1523     11/27/2020 1523        Component Value Date/Time    CALCIUM 9.1 11/27/2020 1523    ALKPHOS 78 11/27/2020 1523    AST 32 11/27/2020 1523    ALT 30 11/27/2020 1523    BILITOT 0.3 11/27/2020 1523    ESTGFRAFRICA 45 (A) 11/27/2020 1523    EGFRNONAA 39 (A) 11/27/2020 1523          Lab Results   Component Value Date    TSH 4.087 (H) 11/16/2020     Lab Results   Component Value Date    INR 1.0 11/27/2020    INR 1.0 11/17/2020    INR 1.0 11/16/2020     Lab Results    Component Value Date    WBC 8.15 12/07/2020    HGB 9.6 (L) 12/07/2020    HCT 31.2 (L) 12/07/2020     (H) 12/07/2020     12/07/2020     BMP  Sodium   Date Value Ref Range Status   11/27/2020 140 136 - 145 mmol/L Final     Potassium   Date Value Ref Range Status   11/27/2020 5.0 3.5 - 5.1 mmol/L Final     Chloride   Date Value Ref Range Status   11/27/2020 111 (H) 95 - 110 mmol/L Final     CO2   Date Value Ref Range Status   11/27/2020 21 (L) 23 - 29 mmol/L Final     BUN   Date Value Ref Range Status   11/27/2020 20 8 - 23 mg/dL Final     Creatinine   Date Value Ref Range Status   11/27/2020 1.3 0.5 - 1.4 mg/dL Final     Calcium   Date Value Ref Range Status   11/27/2020 9.1 8.7 - 10.5 mg/dL Final     Anion Gap   Date Value Ref Range Status   11/27/2020 8 8 - 16 mmol/L Final     eGFR if    Date Value Ref Range Status   11/27/2020 45 (A) >60 mL/min/1.73 m^2 Final     eGFR if non    Date Value Ref Range Status   11/27/2020 39 (A) >60 mL/min/1.73 m^2 Final     Comment:     Calculation used to obtain the estimated glomerular filtration  rate (eGFR) is the CKD-EPI equation.        CrCl cannot be calculated (Patient's most recent lab result is older than the maximum 7 days allowed.).    Assessment:     1. Coronary artery disease : multiple vessels, s/p PTCA    2. Essential hypertension    3. Mixed hyperlipidemia    4. Hypothyroidism (acquired)    5. Major depression, chronic    6. Pacemaker    7. Idiopathic chronic gout of multiple sites without tophus    8. Primary osteoarthritis involving multiple joints    9. Ischemic cardiomyopathy    10. S/P gastric bypass    11. Calcification of aorta    12. Chronic anemia    13. 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 Stage 3 chronic kidney disease, unspecified whether stage 3a or 3b CKD    15. Chemotherapy-induced neuropathy    16. S/P TKR (total knee replacement), bilateral    17. Paroxysmal atrial fibrillation     18. Neuropathic pain of right foot    19. Stenosing tenosynovitis of finger of left hand    20. Diffusion capacity of lung (dl), decreased    21. Insomnia secondary to anxiety    22. Abnormal LFTs (liver function tests)    23. Chronic midline low back pain with right-sided sciatica    24. Sacroiliac joint dysfunction      cardaic w/u negative   Asymptomatic.cardiac wise.  No arrythmias clinically chf or angina  Has side effects from gabapentin she will start lower dose 300 mg every evening she will discuss with rheumatology and her pcp.  Lipids on target.   htn good control  Plan:   Continue current therapy  Cardiac low salt diet.  Risk factor modification and excercise program.  F/u in 6 months with lipid cmp

## 2021-02-22 ENCOUNTER — TELEPHONE (OUTPATIENT)
Dept: RADIOLOGY | Facility: HOSPITAL | Age: 81
End: 2021-02-22

## 2021-02-23 ENCOUNTER — TELEPHONE (OUTPATIENT)
Dept: HEMATOLOGY/ONCOLOGY | Facility: CLINIC | Age: 81
End: 2021-02-23

## 2021-02-24 ENCOUNTER — HOSPITAL ENCOUNTER (OUTPATIENT)
Dept: RADIOLOGY | Facility: HOSPITAL | Age: 81
Discharge: HOME | End: 2021-02-24
Attending: INTERNAL MEDICINE
Payer: MEDICARE

## 2021-02-24 DIAGNOSIS — C85.83 LARGE CELL LYMPHOMA OF INTRA-ABDOMINAL LYMPH NODES: ICD-10-CM

## 2021-02-24 PROCEDURE — 78815 PET IMAGE W/CT SKULL-THIGH: CPT | Mod: 26,PS,, | Performed by: RADIOLOGY

## 2021-02-24 PROCEDURE — 78815 NM PET CT ROUTINE: ICD-10-PCS | Mod: 26,PS,, | Performed by: RADIOLOGY

## 2021-02-24 PROCEDURE — 78815 PET IMAGE W/CT SKULL-THIGH: CPT | Mod: TC,PS

## 2021-02-25 ENCOUNTER — OFFICE VISIT (OUTPATIENT)
Dept: INTERNAL MEDICINE | Facility: CLINIC | Age: 81
End: 2021-02-25
Payer: MEDICARE

## 2021-02-25 VITALS
HEART RATE: 68 BPM | HEIGHT: 64 IN | OXYGEN SATURATION: 95 % | DIASTOLIC BLOOD PRESSURE: 78 MMHG | SYSTOLIC BLOOD PRESSURE: 132 MMHG | BODY MASS INDEX: 23.24 KG/M2 | TEMPERATURE: 98 F | WEIGHT: 136.13 LBS

## 2021-02-25 DIAGNOSIS — E78.2 MIXED HYPERLIPIDEMIA: ICD-10-CM

## 2021-02-25 DIAGNOSIS — E03.9 HYPOTHYROIDISM (ACQUIRED): ICD-10-CM

## 2021-02-25 DIAGNOSIS — I10 ESSENTIAL HYPERTENSION: Primary | ICD-10-CM

## 2021-02-25 PROCEDURE — 1126F AMNT PAIN NOTED NONE PRSNT: CPT | Mod: S$GLB,,, | Performed by: FAMILY MEDICINE

## 2021-02-25 PROCEDURE — 3288F PR FALLS RISK ASSESSMENT DOCUMENTED: ICD-10-PCS | Mod: CPTII,S$GLB,, | Performed by: FAMILY MEDICINE

## 2021-02-25 PROCEDURE — 3075F PR MOST RECENT SYSTOLIC BLOOD PRESS GE 130-139MM HG: ICD-10-PCS | Mod: CPTII,S$GLB,, | Performed by: FAMILY MEDICINE

## 2021-02-25 PROCEDURE — 3075F SYST BP GE 130 - 139MM HG: CPT | Mod: CPTII,S$GLB,, | Performed by: FAMILY MEDICINE

## 2021-02-25 PROCEDURE — 99214 PR OFFICE/OUTPT VISIT, EST, LEVL IV, 30-39 MIN: ICD-10-PCS | Mod: S$GLB,,, | Performed by: FAMILY MEDICINE

## 2021-02-25 PROCEDURE — 3288F FALL RISK ASSESSMENT DOCD: CPT | Mod: CPTII,S$GLB,, | Performed by: FAMILY MEDICINE

## 2021-02-25 PROCEDURE — 1101F PR PT FALLS ASSESS DOC 0-1 FALLS W/OUT INJ PAST YR: ICD-10-PCS | Mod: CPTII,S$GLB,, | Performed by: FAMILY MEDICINE

## 2021-02-25 PROCEDURE — 1159F MED LIST DOCD IN RCRD: CPT | Mod: S$GLB,,, | Performed by: FAMILY MEDICINE

## 2021-02-25 PROCEDURE — 1157F ADVNC CARE PLAN IN RCRD: CPT | Mod: S$GLB,,, | Performed by: FAMILY MEDICINE

## 2021-02-25 PROCEDURE — 3078F PR MOST RECENT DIASTOLIC BLOOD PRESSURE < 80 MM HG: ICD-10-PCS | Mod: CPTII,S$GLB,, | Performed by: FAMILY MEDICINE

## 2021-02-25 PROCEDURE — 99214 OFFICE O/P EST MOD 30 MIN: CPT | Mod: S$GLB,,, | Performed by: FAMILY MEDICINE

## 2021-02-25 PROCEDURE — 1157F PR ADVANCE CARE PLAN OR EQUIV PRESENT IN MEDICAL RECORD: ICD-10-PCS | Mod: S$GLB,,, | Performed by: FAMILY MEDICINE

## 2021-02-25 PROCEDURE — 3078F DIAST BP <80 MM HG: CPT | Mod: CPTII,S$GLB,, | Performed by: FAMILY MEDICINE

## 2021-02-25 PROCEDURE — 1101F PT FALLS ASSESS-DOCD LE1/YR: CPT | Mod: CPTII,S$GLB,, | Performed by: FAMILY MEDICINE

## 2021-02-25 PROCEDURE — 99999 PR PBB SHADOW E&M-EST. PATIENT-LVL V: ICD-10-PCS | Mod: PBBFAC,,, | Performed by: FAMILY MEDICINE

## 2021-02-25 PROCEDURE — 1159F PR MEDICATION LIST DOCUMENTED IN MEDICAL RECORD: ICD-10-PCS | Mod: S$GLB,,, | Performed by: FAMILY MEDICINE

## 2021-02-25 PROCEDURE — 99999 PR PBB SHADOW E&M-EST. PATIENT-LVL V: CPT | Mod: PBBFAC,,, | Performed by: FAMILY MEDICINE

## 2021-02-25 PROCEDURE — 1126F PR PAIN SEVERITY QUANTIFIED, NO PAIN PRESENT: ICD-10-PCS | Mod: S$GLB,,, | Performed by: FAMILY MEDICINE

## 2021-02-25 RX ORDER — GABAPENTIN 300 MG/1
300 CAPSULE ORAL DAILY
COMMUNITY
End: 2021-03-16 | Stop reason: SDUPTHER

## 2021-03-15 ENCOUNTER — TELEPHONE (OUTPATIENT)
Dept: RHEUMATOLOGY | Facility: CLINIC | Age: 81
End: 2021-03-15

## 2021-03-16 ENCOUNTER — OFFICE VISIT (OUTPATIENT)
Dept: RHEUMATOLOGY | Facility: CLINIC | Age: 81
End: 2021-03-16
Payer: MEDICARE

## 2021-03-16 ENCOUNTER — INFUSION (OUTPATIENT)
Dept: INFUSION THERAPY | Facility: HOSPITAL | Age: 81
End: 2021-03-16
Attending: INTERNAL MEDICINE
Payer: MEDICARE

## 2021-03-16 VITALS
DIASTOLIC BLOOD PRESSURE: 81 MMHG | SYSTOLIC BLOOD PRESSURE: 129 MMHG | BODY MASS INDEX: 23.6 KG/M2 | WEIGHT: 138.25 LBS | HEART RATE: 83 BPM | HEIGHT: 64 IN

## 2021-03-16 DIAGNOSIS — M15.9 PRIMARY OSTEOARTHRITIS INVOLVING MULTIPLE JOINTS: ICD-10-CM

## 2021-03-16 DIAGNOSIS — M1A.09X0 IDIOPATHIC CHRONIC GOUT OF MULTIPLE SITES WITHOUT TOPHUS: ICD-10-CM

## 2021-03-16 DIAGNOSIS — M81.0 AGE-RELATED OSTEOPOROSIS WITHOUT CURRENT PATHOLOGICAL FRACTURE: Primary | ICD-10-CM

## 2021-03-16 DIAGNOSIS — Z91.89 AT HIGH RISK FOR HYPOCALCEMIA: ICD-10-CM

## 2021-03-16 DIAGNOSIS — G62.9 NEUROPATHY: ICD-10-CM

## 2021-03-16 PROCEDURE — 1157F ADVNC CARE PLAN IN RCRD: CPT | Mod: S$GLB,,, | Performed by: INTERNAL MEDICINE

## 2021-03-16 PROCEDURE — 3288F FALL RISK ASSESSMENT DOCD: CPT | Mod: CPTII,S$GLB,, | Performed by: INTERNAL MEDICINE

## 2021-03-16 PROCEDURE — 1100F PTFALLS ASSESS-DOCD GE2>/YR: CPT | Mod: CPTII,S$GLB,, | Performed by: INTERNAL MEDICINE

## 2021-03-16 PROCEDURE — 63600175 PHARM REV CODE 636 W HCPCS: Mod: JG | Performed by: INTERNAL MEDICINE

## 2021-03-16 PROCEDURE — 3074F SYST BP LT 130 MM HG: CPT | Mod: CPTII,S$GLB,, | Performed by: INTERNAL MEDICINE

## 2021-03-16 PROCEDURE — 99215 OFFICE O/P EST HI 40 MIN: CPT | Mod: S$GLB,,, | Performed by: INTERNAL MEDICINE

## 2021-03-16 PROCEDURE — 1100F PR PT FALLS ASSESS DOC 2+ FALLS/FALL W/INJURY/YR: ICD-10-PCS | Mod: CPTII,S$GLB,, | Performed by: INTERNAL MEDICINE

## 2021-03-16 PROCEDURE — 3079F PR MOST RECENT DIASTOLIC BLOOD PRESSURE 80-89 MM HG: ICD-10-PCS | Mod: CPTII,S$GLB,, | Performed by: INTERNAL MEDICINE

## 2021-03-16 PROCEDURE — 1159F PR MEDICATION LIST DOCUMENTED IN MEDICAL RECORD: ICD-10-PCS | Mod: S$GLB,,, | Performed by: INTERNAL MEDICINE

## 2021-03-16 PROCEDURE — 1157F PR ADVANCE CARE PLAN OR EQUIV PRESENT IN MEDICAL RECORD: ICD-10-PCS | Mod: S$GLB,,, | Performed by: INTERNAL MEDICINE

## 2021-03-16 PROCEDURE — 3074F PR MOST RECENT SYSTOLIC BLOOD PRESSURE < 130 MM HG: ICD-10-PCS | Mod: CPTII,S$GLB,, | Performed by: INTERNAL MEDICINE

## 2021-03-16 PROCEDURE — 3288F PR FALLS RISK ASSESSMENT DOCUMENTED: ICD-10-PCS | Mod: CPTII,S$GLB,, | Performed by: INTERNAL MEDICINE

## 2021-03-16 PROCEDURE — 3079F DIAST BP 80-89 MM HG: CPT | Mod: CPTII,S$GLB,, | Performed by: INTERNAL MEDICINE

## 2021-03-16 PROCEDURE — 1126F AMNT PAIN NOTED NONE PRSNT: CPT | Mod: S$GLB,,, | Performed by: INTERNAL MEDICINE

## 2021-03-16 PROCEDURE — 1159F MED LIST DOCD IN RCRD: CPT | Mod: S$GLB,,, | Performed by: INTERNAL MEDICINE

## 2021-03-16 PROCEDURE — 99999 PR PBB SHADOW E&M-EST. PATIENT-LVL III: ICD-10-PCS | Mod: PBBFAC,,, | Performed by: INTERNAL MEDICINE

## 2021-03-16 PROCEDURE — 96372 THER/PROPH/DIAG INJ SC/IM: CPT

## 2021-03-16 PROCEDURE — 99215 PR OFFICE/OUTPT VISIT, EST, LEVL V, 40-54 MIN: ICD-10-PCS | Mod: S$GLB,,, | Performed by: INTERNAL MEDICINE

## 2021-03-16 PROCEDURE — 1126F PR PAIN SEVERITY QUANTIFIED, NO PAIN PRESENT: ICD-10-PCS | Mod: S$GLB,,, | Performed by: INTERNAL MEDICINE

## 2021-03-16 PROCEDURE — 99999 PR PBB SHADOW E&M-EST. PATIENT-LVL III: CPT | Mod: PBBFAC,,, | Performed by: INTERNAL MEDICINE

## 2021-03-16 RX ORDER — GABAPENTIN 300 MG/1
300 CAPSULE ORAL NIGHTLY
Qty: 30 CAPSULE | Refills: 11 | Status: SHIPPED | OUTPATIENT
Start: 2021-03-16 | End: 2021-09-21 | Stop reason: SDUPTHER

## 2021-03-16 RX ORDER — DULOXETIN HYDROCHLORIDE 30 MG/1
30 CAPSULE, DELAYED RELEASE ORAL DAILY
Qty: 30 CAPSULE | Refills: 11 | Status: SHIPPED | OUTPATIENT
Start: 2021-03-16 | End: 2021-09-21 | Stop reason: ALTCHOICE

## 2021-03-16 RX ADMIN — DENOSUMAB 60 MG: 60 INJECTION SUBCUTANEOUS at 11:03

## 2021-03-22 ENCOUNTER — LAB VISIT (OUTPATIENT)
Dept: LAB | Facility: HOSPITAL | Age: 81
End: 2021-03-22
Attending: INTERNAL MEDICINE
Payer: MEDICARE

## 2021-03-22 DIAGNOSIS — M1A.09X0 IDIOPATHIC CHRONIC GOUT OF MULTIPLE SITES WITHOUT TOPHUS: ICD-10-CM

## 2021-03-22 DIAGNOSIS — M81.0 AGE-RELATED OSTEOPOROSIS WITHOUT CURRENT PATHOLOGICAL FRACTURE: ICD-10-CM

## 2021-03-22 DIAGNOSIS — C85.83 LARGE CELL LYMPHOMA OF INTRA-ABDOMINAL LYMPH NODES: ICD-10-CM

## 2021-03-22 LAB
ALBUMIN SERPL BCP-MCNC: 3.4 G/DL (ref 3.5–5.2)
ALBUMIN SERPL BCP-MCNC: 3.4 G/DL (ref 3.5–5.2)
ALP SERPL-CCNC: 70 U/L (ref 55–135)
ALP SERPL-CCNC: 70 U/L (ref 55–135)
ALT SERPL W/O P-5'-P-CCNC: 49 U/L (ref 10–44)
ALT SERPL W/O P-5'-P-CCNC: 49 U/L (ref 10–44)
ANION GAP SERPL CALC-SCNC: 6 MMOL/L (ref 8–16)
ANION GAP SERPL CALC-SCNC: 6 MMOL/L (ref 8–16)
AST SERPL-CCNC: 46 U/L (ref 10–40)
AST SERPL-CCNC: 46 U/L (ref 10–40)
BASOPHILS # BLD AUTO: 0.05 K/UL (ref 0–0.2)
BASOPHILS NFR BLD: 0.6 % (ref 0–1.9)
BILIRUB SERPL-MCNC: 0.2 MG/DL (ref 0.1–1)
BILIRUB SERPL-MCNC: 0.2 MG/DL (ref 0.1–1)
BUN SERPL-MCNC: 23 MG/DL (ref 8–23)
BUN SERPL-MCNC: 23 MG/DL (ref 8–23)
CALCIUM SERPL-MCNC: 7.9 MG/DL (ref 8.7–10.5)
CALCIUM SERPL-MCNC: 7.9 MG/DL (ref 8.7–10.5)
CHLORIDE SERPL-SCNC: 118 MMOL/L (ref 95–110)
CHLORIDE SERPL-SCNC: 118 MMOL/L (ref 95–110)
CO2 SERPL-SCNC: 17 MMOL/L (ref 23–29)
CO2 SERPL-SCNC: 17 MMOL/L (ref 23–29)
CREAT SERPL-MCNC: 1.4 MG/DL (ref 0.5–1.4)
CREAT SERPL-MCNC: 1.4 MG/DL (ref 0.5–1.4)
DIFFERENTIAL METHOD: ABNORMAL
EOSINOPHIL # BLD AUTO: 0.1 K/UL (ref 0–0.5)
EOSINOPHIL NFR BLD: 1.5 % (ref 0–8)
ERYTHROCYTE [DISTWIDTH] IN BLOOD BY AUTOMATED COUNT: 15.9 % (ref 11.5–14.5)
EST. GFR  (AFRICAN AMERICAN): 41 ML/MIN/1.73 M^2
EST. GFR  (AFRICAN AMERICAN): 41 ML/MIN/1.73 M^2
EST. GFR  (NON AFRICAN AMERICAN): 36 ML/MIN/1.73 M^2
EST. GFR  (NON AFRICAN AMERICAN): 36 ML/MIN/1.73 M^2
GLUCOSE SERPL-MCNC: 91 MG/DL (ref 70–110)
GLUCOSE SERPL-MCNC: 91 MG/DL (ref 70–110)
HCT VFR BLD AUTO: 33.7 % (ref 37–48.5)
HGB BLD-MCNC: 10.4 G/DL (ref 12–16)
IMM GRANULOCYTES # BLD AUTO: 0.05 K/UL (ref 0–0.04)
IMM GRANULOCYTES NFR BLD AUTO: 0.6 % (ref 0–0.5)
LYMPHOCYTES # BLD AUTO: 4.1 K/UL (ref 1–4.8)
LYMPHOCYTES NFR BLD: 47.8 % (ref 18–48)
MCH RBC QN AUTO: 32.7 PG (ref 27–31)
MCHC RBC AUTO-ENTMCNC: 30.9 G/DL (ref 32–36)
MCV RBC AUTO: 106 FL (ref 82–98)
MONOCYTES # BLD AUTO: 0.7 K/UL (ref 0.3–1)
MONOCYTES NFR BLD: 7.5 % (ref 4–15)
NEUTROPHILS # BLD AUTO: 3.6 K/UL (ref 1.8–7.7)
NEUTROPHILS NFR BLD: 42 % (ref 38–73)
NRBC BLD-RTO: 0 /100 WBC
PLATELET # BLD AUTO: 208 K/UL (ref 150–350)
PMV BLD AUTO: 10.1 FL (ref 9.2–12.9)
POTASSIUM SERPL-SCNC: 4.9 MMOL/L (ref 3.5–5.1)
POTASSIUM SERPL-SCNC: 4.9 MMOL/L (ref 3.5–5.1)
PROT SERPL-MCNC: 6.7 G/DL (ref 6–8.4)
PROT SERPL-MCNC: 6.7 G/DL (ref 6–8.4)
RBC # BLD AUTO: 3.18 M/UL (ref 4–5.4)
SODIUM SERPL-SCNC: 141 MMOL/L (ref 136–145)
SODIUM SERPL-SCNC: 141 MMOL/L (ref 136–145)
WBC # BLD AUTO: 8.62 K/UL (ref 3.9–12.7)

## 2021-03-22 PROCEDURE — 85025 COMPLETE CBC W/AUTO DIFF WBC: CPT | Performed by: INTERNAL MEDICINE

## 2021-03-22 PROCEDURE — 36415 COLL VENOUS BLD VENIPUNCTURE: CPT | Performed by: INTERNAL MEDICINE

## 2021-03-22 PROCEDURE — 80053 COMPREHEN METABOLIC PANEL: CPT | Performed by: INTERNAL MEDICINE

## 2021-03-24 ENCOUNTER — OFFICE VISIT (OUTPATIENT)
Dept: HEMATOLOGY/ONCOLOGY | Facility: CLINIC | Age: 81
End: 2021-03-24
Payer: MEDICARE

## 2021-03-24 VITALS
RESPIRATION RATE: 16 BRPM | HEIGHT: 64 IN | OXYGEN SATURATION: 97 % | WEIGHT: 138.69 LBS | SYSTOLIC BLOOD PRESSURE: 150 MMHG | DIASTOLIC BLOOD PRESSURE: 95 MMHG | TEMPERATURE: 98 F | BODY MASS INDEX: 23.68 KG/M2 | HEART RATE: 90 BPM

## 2021-03-24 DIAGNOSIS — E83.51 HYPOCALCEMIA: ICD-10-CM

## 2021-03-24 DIAGNOSIS — C85.83 LARGE CELL LYMPHOMA OF INTRA-ABDOMINAL LYMPH NODES: Primary | ICD-10-CM

## 2021-03-24 PROCEDURE — 1126F AMNT PAIN NOTED NONE PRSNT: CPT | Mod: S$GLB,,, | Performed by: INTERNAL MEDICINE

## 2021-03-24 PROCEDURE — 1126F PR PAIN SEVERITY QUANTIFIED, NO PAIN PRESENT: ICD-10-PCS | Mod: S$GLB,,, | Performed by: INTERNAL MEDICINE

## 2021-03-24 PROCEDURE — 99999 PR PBB SHADOW E&M-EST. PATIENT-LVL V: CPT | Mod: PBBFAC,,, | Performed by: INTERNAL MEDICINE

## 2021-03-24 PROCEDURE — 1101F PT FALLS ASSESS-DOCD LE1/YR: CPT | Mod: CPTII,S$GLB,, | Performed by: INTERNAL MEDICINE

## 2021-03-24 PROCEDURE — 1159F PR MEDICATION LIST DOCUMENTED IN MEDICAL RECORD: ICD-10-PCS | Mod: S$GLB,,, | Performed by: INTERNAL MEDICINE

## 2021-03-24 PROCEDURE — 1101F PR PT FALLS ASSESS DOC 0-1 FALLS W/OUT INJ PAST YR: ICD-10-PCS | Mod: CPTII,S$GLB,, | Performed by: INTERNAL MEDICINE

## 2021-03-24 PROCEDURE — 3288F FALL RISK ASSESSMENT DOCD: CPT | Mod: CPTII,S$GLB,, | Performed by: INTERNAL MEDICINE

## 2021-03-24 PROCEDURE — 1157F ADVNC CARE PLAN IN RCRD: CPT | Mod: S$GLB,,, | Performed by: INTERNAL MEDICINE

## 2021-03-24 PROCEDURE — 1159F MED LIST DOCD IN RCRD: CPT | Mod: S$GLB,,, | Performed by: INTERNAL MEDICINE

## 2021-03-24 PROCEDURE — 99999 PR PBB SHADOW E&M-EST. PATIENT-LVL V: ICD-10-PCS | Mod: PBBFAC,,, | Performed by: INTERNAL MEDICINE

## 2021-03-24 PROCEDURE — 3288F PR FALLS RISK ASSESSMENT DOCUMENTED: ICD-10-PCS | Mod: CPTII,S$GLB,, | Performed by: INTERNAL MEDICINE

## 2021-03-24 PROCEDURE — 3080F PR MOST RECENT DIASTOLIC BLOOD PRESSURE >= 90 MM HG: ICD-10-PCS | Mod: CPTII,S$GLB,, | Performed by: INTERNAL MEDICINE

## 2021-03-24 PROCEDURE — 1157F PR ADVANCE CARE PLAN OR EQUIV PRESENT IN MEDICAL RECORD: ICD-10-PCS | Mod: S$GLB,,, | Performed by: INTERNAL MEDICINE

## 2021-03-24 PROCEDURE — 99214 PR OFFICE/OUTPT VISIT, EST, LEVL IV, 30-39 MIN: ICD-10-PCS | Mod: S$GLB,,, | Performed by: INTERNAL MEDICINE

## 2021-03-24 PROCEDURE — 3077F PR MOST RECENT SYSTOLIC BLOOD PRESSURE >= 140 MM HG: ICD-10-PCS | Mod: CPTII,S$GLB,, | Performed by: INTERNAL MEDICINE

## 2021-03-24 PROCEDURE — 99214 OFFICE O/P EST MOD 30 MIN: CPT | Mod: S$GLB,,, | Performed by: INTERNAL MEDICINE

## 2021-03-24 PROCEDURE — 3080F DIAST BP >= 90 MM HG: CPT | Mod: CPTII,S$GLB,, | Performed by: INTERNAL MEDICINE

## 2021-03-24 PROCEDURE — 3077F SYST BP >= 140 MM HG: CPT | Mod: CPTII,S$GLB,, | Performed by: INTERNAL MEDICINE

## 2021-03-29 ENCOUNTER — TELEPHONE (OUTPATIENT)
Dept: RHEUMATOLOGY | Facility: CLINIC | Age: 81
End: 2021-03-29

## 2021-03-31 ENCOUNTER — IMMUNIZATION (OUTPATIENT)
Dept: INTERNAL MEDICINE | Facility: CLINIC | Age: 81
End: 2021-03-31
Payer: MEDICARE

## 2021-03-31 DIAGNOSIS — Z23 NEED FOR VACCINATION: Primary | ICD-10-CM

## 2021-03-31 PROCEDURE — 91300 COVID-19, MRNA, LNP-S, PF, 30 MCG/0.3 ML DOSE VACCINE: CPT | Mod: PBBFAC | Performed by: FAMILY MEDICINE

## 2021-04-15 ENCOUNTER — PES CALL (OUTPATIENT)
Dept: ADMINISTRATIVE | Facility: CLINIC | Age: 81
End: 2021-04-15

## 2021-04-21 ENCOUNTER — LAB VISIT (OUTPATIENT)
Dept: LAB | Facility: HOSPITAL | Age: 81
End: 2021-04-21
Attending: INTERNAL MEDICINE
Payer: MEDICARE

## 2021-04-21 ENCOUNTER — IMMUNIZATION (OUTPATIENT)
Dept: INTERNAL MEDICINE | Facility: CLINIC | Age: 81
End: 2021-04-21
Payer: MEDICARE

## 2021-04-21 DIAGNOSIS — N18.31 CHRONIC KIDNEY DISEASE (CKD) STAGE G3A/A1, MODERATELY DECREASED GLOMERULAR FILTRATION RATE (GFR) BETWEEN 45-59 ML/MIN/1.73 SQUARE METER AND ALBUMINURIA CREATININE RATIO LESS THAN 30 MG/G: ICD-10-CM

## 2021-04-21 DIAGNOSIS — Z23 NEED FOR VACCINATION: Primary | ICD-10-CM

## 2021-04-21 DIAGNOSIS — E87.5 HYPERKALEMIA: ICD-10-CM

## 2021-04-21 LAB
BASOPHILS # BLD AUTO: 0.03 K/UL (ref 0–0.2)
BASOPHILS NFR BLD: 0.4 % (ref 0–1.9)
DIFFERENTIAL METHOD: ABNORMAL
EOSINOPHIL # BLD AUTO: 0.2 K/UL (ref 0–0.5)
EOSINOPHIL NFR BLD: 2.4 % (ref 0–8)
ERYTHROCYTE [DISTWIDTH] IN BLOOD BY AUTOMATED COUNT: 16.6 % (ref 11.5–14.5)
HCT VFR BLD AUTO: 31.1 % (ref 37–48.5)
HGB BLD-MCNC: 9.8 G/DL (ref 12–16)
IMM GRANULOCYTES # BLD AUTO: 0.05 K/UL (ref 0–0.04)
IMM GRANULOCYTES NFR BLD AUTO: 0.7 % (ref 0–0.5)
LYMPHOCYTES # BLD AUTO: 2.9 K/UL (ref 1–4.8)
LYMPHOCYTES NFR BLD: 40.9 % (ref 18–48)
MCH RBC QN AUTO: 33 PG (ref 27–31)
MCHC RBC AUTO-ENTMCNC: 31.5 G/DL (ref 32–36)
MCV RBC AUTO: 105 FL (ref 82–98)
MONOCYTES # BLD AUTO: 0.4 K/UL (ref 0.3–1)
MONOCYTES NFR BLD: 6 % (ref 4–15)
NEUTROPHILS # BLD AUTO: 3.6 K/UL (ref 1.8–7.7)
NEUTROPHILS NFR BLD: 49.6 % (ref 38–73)
NRBC BLD-RTO: 0 /100 WBC
PLATELET # BLD AUTO: 229 K/UL (ref 150–450)
PMV BLD AUTO: 9.2 FL (ref 9.2–12.9)
PTH-INTACT SERPL-MCNC: 273 PG/ML (ref 9–77)
RBC # BLD AUTO: 2.97 M/UL (ref 4–5.4)
WBC # BLD AUTO: 7.16 K/UL (ref 3.9–12.7)

## 2021-04-21 PROCEDURE — 83970 ASSAY OF PARATHORMONE: CPT | Performed by: INTERNAL MEDICINE

## 2021-04-21 PROCEDURE — 85025 COMPLETE CBC W/AUTO DIFF WBC: CPT | Performed by: INTERNAL MEDICINE

## 2021-04-21 PROCEDURE — 36415 COLL VENOUS BLD VENIPUNCTURE: CPT | Performed by: INTERNAL MEDICINE

## 2021-04-21 PROCEDURE — 91300 COVID-19, MRNA, LNP-S, PF, 30 MCG/0.3 ML DOSE VACCINE: CPT | Mod: HCNC,PBBFAC | Performed by: FAMILY MEDICINE

## 2021-04-21 PROCEDURE — 0002A COVID-19, MRNA, LNP-S, PF, 30 MCG/0.3 ML DOSE VACCINE: CPT | Mod: HCNC,PBBFAC | Performed by: FAMILY MEDICINE

## 2021-04-30 DIAGNOSIS — F41.9 INSOMNIA SECONDARY TO ANXIETY: ICD-10-CM

## 2021-04-30 DIAGNOSIS — F32.9 MAJOR DEPRESSION, CHRONIC: ICD-10-CM

## 2021-04-30 DIAGNOSIS — F51.05 INSOMNIA SECONDARY TO ANXIETY: ICD-10-CM

## 2021-04-30 DIAGNOSIS — F41.9 ANXIETY: ICD-10-CM

## 2021-04-30 DIAGNOSIS — F41.8 SITUATIONAL ANXIETY: ICD-10-CM

## 2021-05-04 RX ORDER — BUSPIRONE HYDROCHLORIDE 7.5 MG/1
TABLET ORAL
Qty: 90 TABLET | Refills: 3 | Status: SHIPPED | OUTPATIENT
Start: 2021-05-04 | End: 2021-10-04

## 2021-05-04 RX ORDER — SERTRALINE HYDROCHLORIDE 100 MG/1
150 TABLET, FILM COATED ORAL DAILY
Qty: 135 TABLET | Refills: 3 | Status: SHIPPED | OUTPATIENT
Start: 2021-05-04 | End: 2022-01-28

## 2021-05-13 NOTE — CLINICAL REVIEW
I validate the LPN's initial shift assessment and agree with the plan of care.  Sara Rudolph RN     Provider paged ([phone]) FYI Plan is to DC pt in the AM, was supposed to have LE US today, but does not have one ordered? Need order if you would like US to be done before morning. Thanks!    [phone]

## 2021-05-29 NOTE — PROGRESS NOTES
If this is for dental work, she can have her dentist prescribe this   Pt c/o chest pain during office visit. Initial /100. The following medications were given under Dr. Gil's orders:    1342-- 0.4mg SL Nitro given  1345-- /78  1352-- /62  1359-- 325mg ASA given; Nitro Paste 1/2 in. Applied to R chest wall  1400-- /99   1403-- /92  1406-- /89    EMS called. Pt transported to [facility] for further evaluation.

## 2021-06-01 ENCOUNTER — LAB VISIT (OUTPATIENT)
Dept: LAB | Facility: HOSPITAL | Age: 81
End: 2021-06-01
Attending: INTERNAL MEDICINE
Payer: MEDICARE

## 2021-06-01 DIAGNOSIS — I25.10 CORONARY ARTERY DISEASE INVOLVING NATIVE CORONARY ARTERY OF NATIVE HEART WITHOUT ANGINA PECTORIS: Chronic | ICD-10-CM

## 2021-06-01 PROCEDURE — 36415 COLL VENOUS BLD VENIPUNCTURE: CPT | Performed by: INTERNAL MEDICINE

## 2021-06-01 PROCEDURE — 80053 COMPREHEN METABOLIC PANEL: CPT | Performed by: INTERNAL MEDICINE

## 2021-06-01 PROCEDURE — 80061 LIPID PANEL: CPT | Performed by: INTERNAL MEDICINE

## 2021-06-02 LAB
ALBUMIN SERPL BCP-MCNC: 3.5 G/DL (ref 3.5–5.2)
ALP SERPL-CCNC: 68 U/L (ref 55–135)
ALT SERPL W/O P-5'-P-CCNC: 48 U/L (ref 10–44)
ANION GAP SERPL CALC-SCNC: 10 MMOL/L (ref 8–16)
AST SERPL-CCNC: 44 U/L (ref 10–40)
BILIRUB SERPL-MCNC: 0.2 MG/DL (ref 0.1–1)
BUN SERPL-MCNC: 18 MG/DL (ref 8–23)
CALCIUM SERPL-MCNC: 8.5 MG/DL (ref 8.7–10.5)
CHLORIDE SERPL-SCNC: 114 MMOL/L (ref 95–110)
CHOLEST SERPL-MCNC: 140 MG/DL (ref 120–199)
CHOLEST/HDLC SERPL: 1.8 {RATIO} (ref 2–5)
CO2 SERPL-SCNC: 15 MMOL/L (ref 23–29)
CREAT SERPL-MCNC: 1.6 MG/DL (ref 0.5–1.4)
EST. GFR  (AFRICAN AMERICAN): 34.8 ML/MIN/1.73 M^2
EST. GFR  (NON AFRICAN AMERICAN): 30.2 ML/MIN/1.73 M^2
GLUCOSE SERPL-MCNC: 92 MG/DL (ref 70–110)
HDLC SERPL-MCNC: 78 MG/DL (ref 40–75)
HDLC SERPL: 55.7 % (ref 20–50)
LDLC SERPL CALC-MCNC: 43.2 MG/DL (ref 63–159)
NONHDLC SERPL-MCNC: 62 MG/DL
POTASSIUM SERPL-SCNC: 4.8 MMOL/L (ref 3.5–5.1)
PROT SERPL-MCNC: 7 G/DL (ref 6–8.4)
SODIUM SERPL-SCNC: 139 MMOL/L (ref 136–145)
TRIGL SERPL-MCNC: 94 MG/DL (ref 30–150)

## 2021-06-07 ENCOUNTER — OFFICE VISIT (OUTPATIENT)
Dept: CARDIOLOGY | Facility: CLINIC | Age: 81
End: 2021-06-07
Payer: MEDICARE

## 2021-06-07 VITALS
HEART RATE: 72 BPM | HEIGHT: 64 IN | WEIGHT: 139.88 LBS | DIASTOLIC BLOOD PRESSURE: 86 MMHG | SYSTOLIC BLOOD PRESSURE: 146 MMHG | BODY MASS INDEX: 23.88 KG/M2 | OXYGEN SATURATION: 100 %

## 2021-06-07 DIAGNOSIS — I50.32 CHRONIC DIASTOLIC HEART FAILURE: ICD-10-CM

## 2021-06-07 DIAGNOSIS — E78.2 MIXED HYPERLIPIDEMIA: Chronic | ICD-10-CM

## 2021-06-07 DIAGNOSIS — Z95.0 PACEMAKER: Chronic | ICD-10-CM

## 2021-06-07 DIAGNOSIS — C85.83 LARGE CELL LYMPHOMA OF INTRA-ABDOMINAL LYMPH NODES: Chronic | ICD-10-CM

## 2021-06-07 DIAGNOSIS — I25.5 ISCHEMIC CARDIOMYOPATHY: Chronic | ICD-10-CM

## 2021-06-07 DIAGNOSIS — I10 ESSENTIAL HYPERTENSION: Chronic | ICD-10-CM

## 2021-06-07 DIAGNOSIS — G62.0 CHEMOTHERAPY-INDUCED NEUROPATHY: Chronic | ICD-10-CM

## 2021-06-07 DIAGNOSIS — I95.1 POSTURAL HYPOTENSION: ICD-10-CM

## 2021-06-07 DIAGNOSIS — E03.9 HYPOTHYROIDISM (ACQUIRED): Chronic | ICD-10-CM

## 2021-06-07 DIAGNOSIS — I70.0 CALCIFICATION OF AORTA: Chronic | ICD-10-CM

## 2021-06-07 DIAGNOSIS — C83.38 DIFFUSE LARGE B-CELL LYMPHOMA OF LYMPH NODES OF MULTIPLE REGIONS: ICD-10-CM

## 2021-06-07 DIAGNOSIS — N18.30 STAGE 3 CHRONIC KIDNEY DISEASE, UNSPECIFIED WHETHER STAGE 3A OR 3B CKD: Chronic | ICD-10-CM

## 2021-06-07 DIAGNOSIS — T45.1X5A CHEMOTHERAPY-INDUCED NEUROPATHY: Chronic | ICD-10-CM

## 2021-06-07 DIAGNOSIS — I25.10 CORONARY ARTERY DISEASE INVOLVING NATIVE CORONARY ARTERY OF NATIVE HEART WITHOUT ANGINA PECTORIS: Primary | Chronic | ICD-10-CM

## 2021-06-07 DIAGNOSIS — F32.9 MAJOR DEPRESSION, CHRONIC: Chronic | ICD-10-CM

## 2021-06-07 DIAGNOSIS — Z95.0 CARDIAC PACEMAKER IN SITU: ICD-10-CM

## 2021-06-07 DIAGNOSIS — Z98.84 S/P GASTRIC BYPASS: Chronic | ICD-10-CM

## 2021-06-07 PROCEDURE — 3288F FALL RISK ASSESSMENT DOCD: CPT | Mod: CPTII,S$GLB,, | Performed by: INTERNAL MEDICINE

## 2021-06-07 PROCEDURE — 99499 UNLISTED E&M SERVICE: CPT | Mod: S$GLB,,, | Performed by: INTERNAL MEDICINE

## 2021-06-07 PROCEDURE — 99499 RISK ADDL DX/OHS AUDIT: ICD-10-PCS | Mod: S$GLB,,, | Performed by: INTERNAL MEDICINE

## 2021-06-07 PROCEDURE — 99999 PR PBB SHADOW E&M-EST. PATIENT-LVL III: ICD-10-PCS | Mod: PBBFAC,,, | Performed by: INTERNAL MEDICINE

## 2021-06-07 PROCEDURE — 1101F PT FALLS ASSESS-DOCD LE1/YR: CPT | Mod: CPTII,S$GLB,, | Performed by: INTERNAL MEDICINE

## 2021-06-07 PROCEDURE — 3079F PR MOST RECENT DIASTOLIC BLOOD PRESSURE 80-89 MM HG: ICD-10-PCS | Mod: CPTII,S$GLB,, | Performed by: INTERNAL MEDICINE

## 2021-06-07 PROCEDURE — 99214 OFFICE O/P EST MOD 30 MIN: CPT | Mod: S$GLB,,, | Performed by: INTERNAL MEDICINE

## 2021-06-07 PROCEDURE — 99214 PR OFFICE/OUTPT VISIT, EST, LEVL IV, 30-39 MIN: ICD-10-PCS | Mod: S$GLB,,, | Performed by: INTERNAL MEDICINE

## 2021-06-07 PROCEDURE — 3077F SYST BP >= 140 MM HG: CPT | Mod: CPTII,S$GLB,, | Performed by: INTERNAL MEDICINE

## 2021-06-07 PROCEDURE — 1126F PR PAIN SEVERITY QUANTIFIED, NO PAIN PRESENT: ICD-10-PCS | Mod: S$GLB,,, | Performed by: INTERNAL MEDICINE

## 2021-06-07 PROCEDURE — 1157F ADVNC CARE PLAN IN RCRD: CPT | Mod: S$GLB,,, | Performed by: INTERNAL MEDICINE

## 2021-06-07 PROCEDURE — 99999 PR PBB SHADOW E&M-EST. PATIENT-LVL III: CPT | Mod: PBBFAC,,, | Performed by: INTERNAL MEDICINE

## 2021-06-07 PROCEDURE — 3077F PR MOST RECENT SYSTOLIC BLOOD PRESSURE >= 140 MM HG: ICD-10-PCS | Mod: CPTII,S$GLB,, | Performed by: INTERNAL MEDICINE

## 2021-06-07 PROCEDURE — 1159F MED LIST DOCD IN RCRD: CPT | Mod: S$GLB,,, | Performed by: INTERNAL MEDICINE

## 2021-06-07 PROCEDURE — 1101F PR PT FALLS ASSESS DOC 0-1 FALLS W/OUT INJ PAST YR: ICD-10-PCS | Mod: CPTII,S$GLB,, | Performed by: INTERNAL MEDICINE

## 2021-06-07 PROCEDURE — 3079F DIAST BP 80-89 MM HG: CPT | Mod: CPTII,S$GLB,, | Performed by: INTERNAL MEDICINE

## 2021-06-07 PROCEDURE — 1159F PR MEDICATION LIST DOCUMENTED IN MEDICAL RECORD: ICD-10-PCS | Mod: S$GLB,,, | Performed by: INTERNAL MEDICINE

## 2021-06-07 PROCEDURE — 1157F PR ADVANCE CARE PLAN OR EQUIV PRESENT IN MEDICAL RECORD: ICD-10-PCS | Mod: S$GLB,,, | Performed by: INTERNAL MEDICINE

## 2021-06-07 PROCEDURE — 3288F PR FALLS RISK ASSESSMENT DOCUMENTED: ICD-10-PCS | Mod: CPTII,S$GLB,, | Performed by: INTERNAL MEDICINE

## 2021-06-07 PROCEDURE — 1126F AMNT PAIN NOTED NONE PRSNT: CPT | Mod: S$GLB,,, | Performed by: INTERNAL MEDICINE

## 2021-06-07 RX ORDER — METOPROLOL TARTRATE 25 MG/1
25 TABLET, FILM COATED ORAL 2 TIMES DAILY
Qty: 60 TABLET | Refills: 6 | Status: SHIPPED | OUTPATIENT
Start: 2021-06-07 | End: 2021-12-30

## 2021-06-21 ENCOUNTER — LAB VISIT (OUTPATIENT)
Dept: LAB | Facility: HOSPITAL | Age: 81
End: 2021-06-21
Attending: INTERNAL MEDICINE
Payer: MEDICARE

## 2021-06-21 DIAGNOSIS — C85.83 LARGE CELL LYMPHOMA OF INTRA-ABDOMINAL LYMPH NODES: ICD-10-CM

## 2021-06-21 DIAGNOSIS — E83.51 HYPOCALCEMIA: ICD-10-CM

## 2021-06-21 LAB
ALBUMIN SERPL BCP-MCNC: 3.4 G/DL (ref 3.5–5.2)
ALP SERPL-CCNC: 71 U/L (ref 55–135)
ALT SERPL W/O P-5'-P-CCNC: 71 U/L (ref 10–44)
ANION GAP SERPL CALC-SCNC: 6 MMOL/L (ref 8–16)
AST SERPL-CCNC: 90 U/L (ref 10–40)
BASOPHILS # BLD AUTO: 0.04 K/UL (ref 0–0.2)
BASOPHILS NFR BLD: 0.5 % (ref 0–1.9)
BILIRUB SERPL-MCNC: 0.2 MG/DL (ref 0.1–1)
BUN SERPL-MCNC: 28 MG/DL (ref 8–23)
CALCIUM SERPL-MCNC: 8.6 MG/DL (ref 8.7–10.5)
CHLORIDE SERPL-SCNC: 117 MMOL/L (ref 95–110)
CO2 SERPL-SCNC: 16 MMOL/L (ref 23–29)
CREAT SERPL-MCNC: 1.5 MG/DL (ref 0.5–1.4)
DIFFERENTIAL METHOD: ABNORMAL
EOSINOPHIL # BLD AUTO: 0.3 K/UL (ref 0–0.5)
EOSINOPHIL NFR BLD: 4.5 % (ref 0–8)
ERYTHROCYTE [DISTWIDTH] IN BLOOD BY AUTOMATED COUNT: 15.9 % (ref 11.5–14.5)
EST. GFR  (AFRICAN AMERICAN): 38 ML/MIN/1.73 M^2
EST. GFR  (NON AFRICAN AMERICAN): 33 ML/MIN/1.73 M^2
GLUCOSE SERPL-MCNC: 93 MG/DL (ref 70–110)
HCT VFR BLD AUTO: 34.2 % (ref 37–48.5)
HGB BLD-MCNC: 10.4 G/DL (ref 12–16)
IMM GRANULOCYTES # BLD AUTO: 0.04 K/UL (ref 0–0.04)
IMM GRANULOCYTES NFR BLD AUTO: 0.5 % (ref 0–0.5)
LYMPHOCYTES # BLD AUTO: 3.2 K/UL (ref 1–4.8)
LYMPHOCYTES NFR BLD: 43.8 % (ref 18–48)
MCH RBC QN AUTO: 32.5 PG (ref 27–31)
MCHC RBC AUTO-ENTMCNC: 30.4 G/DL (ref 32–36)
MCV RBC AUTO: 107 FL (ref 82–98)
MONOCYTES # BLD AUTO: 0.5 K/UL (ref 0.3–1)
MONOCYTES NFR BLD: 6.8 % (ref 4–15)
NEUTROPHILS # BLD AUTO: 3.2 K/UL (ref 1.8–7.7)
NEUTROPHILS NFR BLD: 43.9 % (ref 38–73)
NRBC BLD-RTO: 0 /100 WBC
PLATELET # BLD AUTO: 235 K/UL (ref 150–450)
PMV BLD AUTO: 10.6 FL (ref 9.2–12.9)
POTASSIUM SERPL-SCNC: 5.4 MMOL/L (ref 3.5–5.1)
PROT SERPL-MCNC: 6.8 G/DL (ref 6–8.4)
RBC # BLD AUTO: 3.2 M/UL (ref 4–5.4)
SODIUM SERPL-SCNC: 139 MMOL/L (ref 136–145)
WBC # BLD AUTO: 7.37 K/UL (ref 3.9–12.7)

## 2021-06-21 PROCEDURE — 80053 COMPREHEN METABOLIC PANEL: CPT | Performed by: INTERNAL MEDICINE

## 2021-06-21 PROCEDURE — 36415 COLL VENOUS BLD VENIPUNCTURE: CPT | Performed by: INTERNAL MEDICINE

## 2021-06-21 PROCEDURE — 85025 COMPLETE CBC W/AUTO DIFF WBC: CPT | Performed by: INTERNAL MEDICINE

## 2021-06-22 ENCOUNTER — OFFICE VISIT (OUTPATIENT)
Dept: HEMATOLOGY/ONCOLOGY | Facility: CLINIC | Age: 81
End: 2021-06-22
Payer: MEDICARE

## 2021-06-22 VITALS
TEMPERATURE: 97 F | HEART RATE: 93 BPM | HEIGHT: 64 IN | OXYGEN SATURATION: 95 % | DIASTOLIC BLOOD PRESSURE: 84 MMHG | WEIGHT: 138.44 LBS | BODY MASS INDEX: 23.64 KG/M2 | SYSTOLIC BLOOD PRESSURE: 122 MMHG

## 2021-06-22 DIAGNOSIS — E87.5 HYPERKALEMIA: Primary | ICD-10-CM

## 2021-06-22 DIAGNOSIS — C83.38 DIFFUSE LARGE B-CELL LYMPHOMA OF LYMPH NODES OF MULTIPLE REGIONS: ICD-10-CM

## 2021-06-22 PROCEDURE — 99214 OFFICE O/P EST MOD 30 MIN: CPT | Mod: S$GLB,,, | Performed by: INTERNAL MEDICINE

## 2021-06-22 PROCEDURE — 1157F PR ADVANCE CARE PLAN OR EQUIV PRESENT IN MEDICAL RECORD: ICD-10-PCS | Mod: S$GLB,,, | Performed by: INTERNAL MEDICINE

## 2021-06-22 PROCEDURE — 99999 PR PBB SHADOW E&M-EST. PATIENT-LVL V: ICD-10-PCS | Mod: PBBFAC,,, | Performed by: INTERNAL MEDICINE

## 2021-06-22 PROCEDURE — 1126F AMNT PAIN NOTED NONE PRSNT: CPT | Mod: S$GLB,,, | Performed by: INTERNAL MEDICINE

## 2021-06-22 PROCEDURE — 99999 PR PBB SHADOW E&M-EST. PATIENT-LVL V: CPT | Mod: PBBFAC,,, | Performed by: INTERNAL MEDICINE

## 2021-06-22 PROCEDURE — 1157F ADVNC CARE PLAN IN RCRD: CPT | Mod: S$GLB,,, | Performed by: INTERNAL MEDICINE

## 2021-06-22 PROCEDURE — 99499 RISK ADDL DX/OHS AUDIT: ICD-10-PCS | Mod: S$GLB,,, | Performed by: INTERNAL MEDICINE

## 2021-06-22 PROCEDURE — 3288F FALL RISK ASSESSMENT DOCD: CPT | Mod: CPTII,S$GLB,, | Performed by: INTERNAL MEDICINE

## 2021-06-22 PROCEDURE — 3288F PR FALLS RISK ASSESSMENT DOCUMENTED: ICD-10-PCS | Mod: CPTII,S$GLB,, | Performed by: INTERNAL MEDICINE

## 2021-06-22 PROCEDURE — 99499 UNLISTED E&M SERVICE: CPT | Mod: S$GLB,,, | Performed by: INTERNAL MEDICINE

## 2021-06-22 PROCEDURE — 1159F PR MEDICATION LIST DOCUMENTED IN MEDICAL RECORD: ICD-10-PCS | Mod: S$GLB,,, | Performed by: INTERNAL MEDICINE

## 2021-06-22 PROCEDURE — 1159F MED LIST DOCD IN RCRD: CPT | Mod: S$GLB,,, | Performed by: INTERNAL MEDICINE

## 2021-06-22 PROCEDURE — 1101F PT FALLS ASSESS-DOCD LE1/YR: CPT | Mod: CPTII,S$GLB,, | Performed by: INTERNAL MEDICINE

## 2021-06-22 PROCEDURE — 1126F PR PAIN SEVERITY QUANTIFIED, NO PAIN PRESENT: ICD-10-PCS | Mod: S$GLB,,, | Performed by: INTERNAL MEDICINE

## 2021-06-22 PROCEDURE — 1101F PR PT FALLS ASSESS DOC 0-1 FALLS W/OUT INJ PAST YR: ICD-10-PCS | Mod: CPTII,S$GLB,, | Performed by: INTERNAL MEDICINE

## 2021-06-22 PROCEDURE — 99214 PR OFFICE/OUTPT VISIT, EST, LEVL IV, 30-39 MIN: ICD-10-PCS | Mod: S$GLB,,, | Performed by: INTERNAL MEDICINE

## 2021-06-29 ENCOUNTER — PES CALL (OUTPATIENT)
Dept: ADMINISTRATIVE | Facility: CLINIC | Age: 81
End: 2021-06-29

## 2021-06-30 ENCOUNTER — LAB VISIT (OUTPATIENT)
Dept: LAB | Facility: HOSPITAL | Age: 81
End: 2021-06-30
Attending: INTERNAL MEDICINE
Payer: MEDICARE

## 2021-06-30 DIAGNOSIS — C83.38 DIFFUSE LARGE B-CELL LYMPHOMA OF LYMPH NODES OF MULTIPLE REGIONS: ICD-10-CM

## 2021-06-30 PROCEDURE — 36415 COLL VENOUS BLD VENIPUNCTURE: CPT | Performed by: INTERNAL MEDICINE

## 2021-06-30 PROCEDURE — 80053 COMPREHEN METABOLIC PANEL: CPT | Performed by: INTERNAL MEDICINE

## 2021-07-01 ENCOUNTER — TELEPHONE (OUTPATIENT)
Dept: HEMATOLOGY/ONCOLOGY | Facility: CLINIC | Age: 81
End: 2021-07-01

## 2021-07-01 LAB
ALBUMIN SERPL BCP-MCNC: 3.5 G/DL (ref 3.5–5.2)
ALP SERPL-CCNC: 58 U/L (ref 55–135)
ALT SERPL W/O P-5'-P-CCNC: 30 U/L (ref 10–44)
ANION GAP SERPL CALC-SCNC: 6 MMOL/L (ref 8–16)
AST SERPL-CCNC: 29 U/L (ref 10–40)
BILIRUB SERPL-MCNC: 0.2 MG/DL (ref 0.1–1)
BUN SERPL-MCNC: 29 MG/DL (ref 8–23)
CALCIUM SERPL-MCNC: 8.3 MG/DL (ref 8.7–10.5)
CHLORIDE SERPL-SCNC: 117 MMOL/L (ref 95–110)
CO2 SERPL-SCNC: 16 MMOL/L (ref 23–29)
CREAT SERPL-MCNC: 1.4 MG/DL (ref 0.5–1.4)
EST. GFR  (AFRICAN AMERICAN): 40.9 ML/MIN/1.73 M^2
EST. GFR  (NON AFRICAN AMERICAN): 35.5 ML/MIN/1.73 M^2
GLUCOSE SERPL-MCNC: 44 MG/DL (ref 70–110)
POTASSIUM SERPL-SCNC: 5 MMOL/L (ref 3.5–5.1)
PROT SERPL-MCNC: 6.5 G/DL (ref 6–8.4)
SODIUM SERPL-SCNC: 139 MMOL/L (ref 136–145)

## 2021-07-02 ENCOUNTER — OFFICE VISIT (OUTPATIENT)
Dept: NEPHROLOGY | Facility: CLINIC | Age: 81
End: 2021-07-02
Payer: MEDICARE

## 2021-07-02 ENCOUNTER — DOCUMENTATION ONLY (OUTPATIENT)
Dept: HEMATOLOGY/ONCOLOGY | Facility: CLINIC | Age: 81
End: 2021-07-02

## 2021-07-02 VITALS
HEART RATE: 60 BPM | DIASTOLIC BLOOD PRESSURE: 64 MMHG | BODY MASS INDEX: 24.1 KG/M2 | WEIGHT: 141.13 LBS | HEIGHT: 64 IN | SYSTOLIC BLOOD PRESSURE: 120 MMHG

## 2021-07-02 DIAGNOSIS — E21.3 HPTH (HYPERPARATHYROIDISM): ICD-10-CM

## 2021-07-02 DIAGNOSIS — E87.20 ACIDOSIS: ICD-10-CM

## 2021-07-02 DIAGNOSIS — E87.5 HYPERKALEMIA: ICD-10-CM

## 2021-07-02 DIAGNOSIS — N18.31 CHRONIC KIDNEY DISEASE (CKD) STAGE G3A/A1, MODERATELY DECREASED GLOMERULAR FILTRATION RATE (GFR) BETWEEN 45-59 ML/MIN/1.73 SQUARE METER AND ALBUMINURIA CREATININE RATIO LESS THAN 30 MG/G: Primary | ICD-10-CM

## 2021-07-02 DIAGNOSIS — N14.0 ANALGESIC NEPHROPATHY: ICD-10-CM

## 2021-07-02 PROCEDURE — 1157F PR ADVANCE CARE PLAN OR EQUIV PRESENT IN MEDICAL RECORD: ICD-10-PCS | Mod: S$GLB,,, | Performed by: INTERNAL MEDICINE

## 2021-07-02 PROCEDURE — 3288F FALL RISK ASSESSMENT DOCD: CPT | Mod: CPTII,S$GLB,, | Performed by: INTERNAL MEDICINE

## 2021-07-02 PROCEDURE — 3288F PR FALLS RISK ASSESSMENT DOCUMENTED: ICD-10-PCS | Mod: CPTII,S$GLB,, | Performed by: INTERNAL MEDICINE

## 2021-07-02 PROCEDURE — 99214 OFFICE O/P EST MOD 30 MIN: CPT | Mod: S$GLB,,, | Performed by: INTERNAL MEDICINE

## 2021-07-02 PROCEDURE — 1101F PR PT FALLS ASSESS DOC 0-1 FALLS W/OUT INJ PAST YR: ICD-10-PCS | Mod: CPTII,S$GLB,, | Performed by: INTERNAL MEDICINE

## 2021-07-02 PROCEDURE — 99499 UNLISTED E&M SERVICE: CPT | Mod: HCNC,S$GLB,, | Performed by: INTERNAL MEDICINE

## 2021-07-02 PROCEDURE — 99214 PR OFFICE/OUTPT VISIT, EST, LEVL IV, 30-39 MIN: ICD-10-PCS | Mod: S$GLB,,, | Performed by: INTERNAL MEDICINE

## 2021-07-02 PROCEDURE — 99999 PR PBB SHADOW E&M-EST. PATIENT-LVL III: CPT | Mod: PBBFAC,,, | Performed by: INTERNAL MEDICINE

## 2021-07-02 PROCEDURE — 99499 RISK ADDL DX/OHS AUDIT: ICD-10-PCS | Mod: HCNC,S$GLB,, | Performed by: INTERNAL MEDICINE

## 2021-07-02 PROCEDURE — 1157F ADVNC CARE PLAN IN RCRD: CPT | Mod: S$GLB,,, | Performed by: INTERNAL MEDICINE

## 2021-07-02 PROCEDURE — 1159F MED LIST DOCD IN RCRD: CPT | Mod: S$GLB,,, | Performed by: INTERNAL MEDICINE

## 2021-07-02 PROCEDURE — 99999 PR PBB SHADOW E&M-EST. PATIENT-LVL III: ICD-10-PCS | Mod: PBBFAC,,, | Performed by: INTERNAL MEDICINE

## 2021-07-02 PROCEDURE — 1126F PR PAIN SEVERITY QUANTIFIED, NO PAIN PRESENT: ICD-10-PCS | Mod: S$GLB,,, | Performed by: INTERNAL MEDICINE

## 2021-07-02 PROCEDURE — 1159F PR MEDICATION LIST DOCUMENTED IN MEDICAL RECORD: ICD-10-PCS | Mod: S$GLB,,, | Performed by: INTERNAL MEDICINE

## 2021-07-02 PROCEDURE — 1101F PT FALLS ASSESS-DOCD LE1/YR: CPT | Mod: CPTII,S$GLB,, | Performed by: INTERNAL MEDICINE

## 2021-07-02 PROCEDURE — 1126F AMNT PAIN NOTED NONE PRSNT: CPT | Mod: S$GLB,,, | Performed by: INTERNAL MEDICINE

## 2021-07-02 RX ORDER — SODIUM BICARBONATE 650 MG/1
650 TABLET ORAL 2 TIMES DAILY
Qty: 60 TABLET | Refills: 11 | Status: SHIPPED | OUTPATIENT
Start: 2021-07-02 | End: 2022-07-29 | Stop reason: SDUPTHER

## 2021-09-20 ENCOUNTER — PATIENT OUTREACH (OUTPATIENT)
Dept: ADMINISTRATIVE | Facility: OTHER | Age: 81
End: 2021-09-20

## 2021-09-20 ENCOUNTER — TELEPHONE (OUTPATIENT)
Dept: RHEUMATOLOGY | Facility: CLINIC | Age: 81
End: 2021-09-20

## 2021-09-21 ENCOUNTER — OFFICE VISIT (OUTPATIENT)
Dept: RHEUMATOLOGY | Facility: CLINIC | Age: 81
End: 2021-09-21
Payer: MEDICARE

## 2021-09-21 ENCOUNTER — INFUSION (OUTPATIENT)
Dept: INFUSION THERAPY | Facility: HOSPITAL | Age: 81
End: 2021-09-21
Attending: INTERNAL MEDICINE
Payer: MEDICARE

## 2021-09-21 VITALS
BODY MASS INDEX: 24.81 KG/M2 | HEIGHT: 64 IN | HEART RATE: 94 BPM | WEIGHT: 145.31 LBS | SYSTOLIC BLOOD PRESSURE: 147 MMHG | DIASTOLIC BLOOD PRESSURE: 98 MMHG

## 2021-09-21 DIAGNOSIS — M81.0 AGE-RELATED OSTEOPOROSIS WITHOUT CURRENT PATHOLOGICAL FRACTURE: Primary | ICD-10-CM

## 2021-09-21 DIAGNOSIS — G62.9 NEUROPATHY: ICD-10-CM

## 2021-09-21 DIAGNOSIS — M1A.09X0 IDIOPATHIC CHRONIC GOUT, MULTIPLE SITES, WITHOUT TOPHUS (TOPHI): ICD-10-CM

## 2021-09-21 DIAGNOSIS — E55.9 VITAMIN D DEFICIENCY: ICD-10-CM

## 2021-09-21 DIAGNOSIS — M15.9 PRIMARY OSTEOARTHRITIS INVOLVING MULTIPLE JOINTS: ICD-10-CM

## 2021-09-21 PROCEDURE — 3080F PR MOST RECENT DIASTOLIC BLOOD PRESSURE >= 90 MM HG: ICD-10-PCS | Mod: HCNC,CPTII,S$GLB, | Performed by: INTERNAL MEDICINE

## 2021-09-21 PROCEDURE — 3080F DIAST BP >= 90 MM HG: CPT | Mod: HCNC,CPTII,S$GLB, | Performed by: INTERNAL MEDICINE

## 2021-09-21 PROCEDURE — 1101F PT FALLS ASSESS-DOCD LE1/YR: CPT | Mod: HCNC,CPTII,S$GLB, | Performed by: INTERNAL MEDICINE

## 2021-09-21 PROCEDURE — 1157F ADVNC CARE PLAN IN RCRD: CPT | Mod: HCNC,CPTII,S$GLB, | Performed by: INTERNAL MEDICINE

## 2021-09-21 PROCEDURE — 1160F PR REVIEW ALL MEDS BY PRESCRIBER/CLIN PHARMACIST DOCUMENTED: ICD-10-PCS | Mod: HCNC,CPTII,S$GLB, | Performed by: INTERNAL MEDICINE

## 2021-09-21 PROCEDURE — 1159F PR MEDICATION LIST DOCUMENTED IN MEDICAL RECORD: ICD-10-PCS | Mod: HCNC,CPTII,S$GLB, | Performed by: INTERNAL MEDICINE

## 2021-09-21 PROCEDURE — 1126F PR PAIN SEVERITY QUANTIFIED, NO PAIN PRESENT: ICD-10-PCS | Mod: HCNC,CPTII,S$GLB, | Performed by: INTERNAL MEDICINE

## 2021-09-21 PROCEDURE — 99999 PR PBB SHADOW E&M-EST. PATIENT-LVL IV: CPT | Mod: PBBFAC,HCNC,, | Performed by: INTERNAL MEDICINE

## 2021-09-21 PROCEDURE — 3288F FALL RISK ASSESSMENT DOCD: CPT | Mod: HCNC,CPTII,S$GLB, | Performed by: INTERNAL MEDICINE

## 2021-09-21 PROCEDURE — 3077F PR MOST RECENT SYSTOLIC BLOOD PRESSURE >= 140 MM HG: ICD-10-PCS | Mod: HCNC,CPTII,S$GLB, | Performed by: INTERNAL MEDICINE

## 2021-09-21 PROCEDURE — 99999 PR PBB SHADOW E&M-EST. PATIENT-LVL IV: ICD-10-PCS | Mod: PBBFAC,HCNC,, | Performed by: INTERNAL MEDICINE

## 2021-09-21 PROCEDURE — 63600175 PHARM REV CODE 636 W HCPCS: Mod: JG,HCNC | Performed by: INTERNAL MEDICINE

## 2021-09-21 PROCEDURE — 99214 PR OFFICE/OUTPT VISIT, EST, LEVL IV, 30-39 MIN: ICD-10-PCS | Mod: HCNC,S$GLB,, | Performed by: INTERNAL MEDICINE

## 2021-09-21 PROCEDURE — 3288F PR FALLS RISK ASSESSMENT DOCUMENTED: ICD-10-PCS | Mod: HCNC,CPTII,S$GLB, | Performed by: INTERNAL MEDICINE

## 2021-09-21 PROCEDURE — 99214 OFFICE O/P EST MOD 30 MIN: CPT | Mod: HCNC,S$GLB,, | Performed by: INTERNAL MEDICINE

## 2021-09-21 PROCEDURE — 1126F AMNT PAIN NOTED NONE PRSNT: CPT | Mod: HCNC,CPTII,S$GLB, | Performed by: INTERNAL MEDICINE

## 2021-09-21 PROCEDURE — 3077F SYST BP >= 140 MM HG: CPT | Mod: HCNC,CPTII,S$GLB, | Performed by: INTERNAL MEDICINE

## 2021-09-21 PROCEDURE — 96372 THER/PROPH/DIAG INJ SC/IM: CPT | Mod: HCNC

## 2021-09-21 PROCEDURE — 1160F RVW MEDS BY RX/DR IN RCRD: CPT | Mod: HCNC,CPTII,S$GLB, | Performed by: INTERNAL MEDICINE

## 2021-09-21 PROCEDURE — 1157F PR ADVANCE CARE PLAN OR EQUIV PRESENT IN MEDICAL RECORD: ICD-10-PCS | Mod: HCNC,CPTII,S$GLB, | Performed by: INTERNAL MEDICINE

## 2021-09-21 PROCEDURE — 1101F PR PT FALLS ASSESS DOC 0-1 FALLS W/OUT INJ PAST YR: ICD-10-PCS | Mod: HCNC,CPTII,S$GLB, | Performed by: INTERNAL MEDICINE

## 2021-09-21 PROCEDURE — 1159F MED LIST DOCD IN RCRD: CPT | Mod: HCNC,CPTII,S$GLB, | Performed by: INTERNAL MEDICINE

## 2021-09-21 RX ORDER — GABAPENTIN 300 MG/1
300 CAPSULE ORAL NIGHTLY
Qty: 30 CAPSULE | Refills: 11 | Status: SHIPPED | OUTPATIENT
Start: 2021-09-21 | End: 2022-10-11 | Stop reason: SINTOL

## 2021-09-21 RX ORDER — ERGOCALCIFEROL 1.25 MG/1
50000 CAPSULE ORAL
Qty: 12 CAPSULE | Refills: 3 | Status: SHIPPED | OUTPATIENT
Start: 2021-09-21 | End: 2022-03-30 | Stop reason: SDUPTHER

## 2021-09-21 RX ORDER — ALLOPURINOL 300 MG/1
300 TABLET ORAL DAILY
Qty: 90 TABLET | Refills: 3 | Status: SHIPPED | OUTPATIENT
Start: 2021-09-21 | End: 2022-10-24

## 2021-09-21 RX ADMIN — DENOSUMAB 60 MG: 60 INJECTION SUBCUTANEOUS at 12:09

## 2021-09-23 RX ORDER — CALCITRIOL 0.25 UG/1
CAPSULE ORAL
Qty: 12 CAPSULE | Refills: 5 | Status: SHIPPED | OUTPATIENT
Start: 2021-09-23 | End: 2022-03-07 | Stop reason: SDUPTHER

## 2021-09-29 ENCOUNTER — OFFICE VISIT (OUTPATIENT)
Dept: HEMATOLOGY/ONCOLOGY | Facility: CLINIC | Age: 81
End: 2021-09-29
Payer: MEDICARE

## 2021-09-29 ENCOUNTER — LAB VISIT (OUTPATIENT)
Dept: LAB | Facility: HOSPITAL | Age: 81
End: 2021-09-29
Attending: INTERNAL MEDICINE
Payer: MEDICARE

## 2021-09-29 VITALS
TEMPERATURE: 97 F | HEIGHT: 64 IN | OXYGEN SATURATION: 86 % | BODY MASS INDEX: 24.92 KG/M2 | DIASTOLIC BLOOD PRESSURE: 95 MMHG | WEIGHT: 145.94 LBS | SYSTOLIC BLOOD PRESSURE: 149 MMHG | HEART RATE: 97 BPM

## 2021-09-29 DIAGNOSIS — C83.30 DIFFUSE LARGE B-CELL LYMPHOMA, UNSPECIFIED BODY REGION: Primary | ICD-10-CM

## 2021-09-29 DIAGNOSIS — D53.9 MACROCYTIC ANEMIA: ICD-10-CM

## 2021-09-29 DIAGNOSIS — C83.30 DIFFUSE LARGE B-CELL LYMPHOMA, UNSPECIFIED BODY REGION: ICD-10-CM

## 2021-09-29 LAB — POTASSIUM SERPL-SCNC: 5.3 MMOL/L (ref 3.5–5.1)

## 2021-09-29 PROCEDURE — 1159F PR MEDICATION LIST DOCUMENTED IN MEDICAL RECORD: ICD-10-PCS | Mod: HCNC,CPTII,S$GLB, | Performed by: INTERNAL MEDICINE

## 2021-09-29 PROCEDURE — 1101F PR PT FALLS ASSESS DOC 0-1 FALLS W/OUT INJ PAST YR: ICD-10-PCS | Mod: HCNC,CPTII,S$GLB, | Performed by: INTERNAL MEDICINE

## 2021-09-29 PROCEDURE — 1157F ADVNC CARE PLAN IN RCRD: CPT | Mod: HCNC,CPTII,S$GLB, | Performed by: INTERNAL MEDICINE

## 2021-09-29 PROCEDURE — 3288F PR FALLS RISK ASSESSMENT DOCUMENTED: ICD-10-PCS | Mod: HCNC,CPTII,S$GLB, | Performed by: INTERNAL MEDICINE

## 2021-09-29 PROCEDURE — 99999 PR PBB SHADOW E&M-EST. PATIENT-LVL III: CPT | Mod: PBBFAC,HCNC,, | Performed by: INTERNAL MEDICINE

## 2021-09-29 PROCEDURE — 3077F PR MOST RECENT SYSTOLIC BLOOD PRESSURE >= 140 MM HG: ICD-10-PCS | Mod: HCNC,CPTII,S$GLB, | Performed by: INTERNAL MEDICINE

## 2021-09-29 PROCEDURE — 1126F PR PAIN SEVERITY QUANTIFIED, NO PAIN PRESENT: ICD-10-PCS | Mod: HCNC,CPTII,S$GLB, | Performed by: INTERNAL MEDICINE

## 2021-09-29 PROCEDURE — 99214 OFFICE O/P EST MOD 30 MIN: CPT | Mod: HCNC,S$GLB,, | Performed by: INTERNAL MEDICINE

## 2021-09-29 PROCEDURE — 84132 ASSAY OF SERUM POTASSIUM: CPT | Mod: HCNC | Performed by: INTERNAL MEDICINE

## 2021-09-29 PROCEDURE — 1157F PR ADVANCE CARE PLAN OR EQUIV PRESENT IN MEDICAL RECORD: ICD-10-PCS | Mod: HCNC,CPTII,S$GLB, | Performed by: INTERNAL MEDICINE

## 2021-09-29 PROCEDURE — 36415 COLL VENOUS BLD VENIPUNCTURE: CPT | Mod: HCNC | Performed by: INTERNAL MEDICINE

## 2021-09-29 PROCEDURE — 3077F SYST BP >= 140 MM HG: CPT | Mod: HCNC,CPTII,S$GLB, | Performed by: INTERNAL MEDICINE

## 2021-09-29 PROCEDURE — 1126F AMNT PAIN NOTED NONE PRSNT: CPT | Mod: HCNC,CPTII,S$GLB, | Performed by: INTERNAL MEDICINE

## 2021-09-29 PROCEDURE — 3288F FALL RISK ASSESSMENT DOCD: CPT | Mod: HCNC,CPTII,S$GLB, | Performed by: INTERNAL MEDICINE

## 2021-09-29 PROCEDURE — 3080F PR MOST RECENT DIASTOLIC BLOOD PRESSURE >= 90 MM HG: ICD-10-PCS | Mod: HCNC,CPTII,S$GLB, | Performed by: INTERNAL MEDICINE

## 2021-09-29 PROCEDURE — 1159F MED LIST DOCD IN RCRD: CPT | Mod: HCNC,CPTII,S$GLB, | Performed by: INTERNAL MEDICINE

## 2021-09-29 PROCEDURE — 1101F PT FALLS ASSESS-DOCD LE1/YR: CPT | Mod: HCNC,CPTII,S$GLB, | Performed by: INTERNAL MEDICINE

## 2021-09-29 PROCEDURE — 99999 PR PBB SHADOW E&M-EST. PATIENT-LVL III: ICD-10-PCS | Mod: PBBFAC,HCNC,, | Performed by: INTERNAL MEDICINE

## 2021-09-29 PROCEDURE — 3080F DIAST BP >= 90 MM HG: CPT | Mod: HCNC,CPTII,S$GLB, | Performed by: INTERNAL MEDICINE

## 2021-09-29 PROCEDURE — 99214 PR OFFICE/OUTPT VISIT, EST, LEVL IV, 30-39 MIN: ICD-10-PCS | Mod: HCNC,S$GLB,, | Performed by: INTERNAL MEDICINE

## 2021-10-04 DIAGNOSIS — E03.9 HYPOTHYROIDISM (ACQUIRED): Chronic | ICD-10-CM

## 2021-10-04 DIAGNOSIS — F41.9 ANXIETY: ICD-10-CM

## 2021-10-04 RX ORDER — BUSPIRONE HYDROCHLORIDE 7.5 MG/1
TABLET ORAL
Qty: 90 TABLET | Refills: 1 | Status: SHIPPED | OUTPATIENT
Start: 2021-10-04 | End: 2021-10-28

## 2021-10-04 RX ORDER — LEVOTHYROXINE SODIUM 75 UG/1
TABLET ORAL
Qty: 90 TABLET | Refills: 1 | Status: SHIPPED | OUTPATIENT
Start: 2021-10-04 | End: 2022-01-28

## 2021-10-28 ENCOUNTER — PES CALL (OUTPATIENT)
Dept: ADMINISTRATIVE | Facility: CLINIC | Age: 81
End: 2021-10-28
Payer: MEDICARE

## 2021-11-01 ENCOUNTER — TELEPHONE (OUTPATIENT)
Dept: INTERNAL MEDICINE | Facility: CLINIC | Age: 81
End: 2021-11-01
Payer: MEDICARE

## 2021-11-02 ENCOUNTER — OFFICE VISIT (OUTPATIENT)
Dept: INTERNAL MEDICINE | Facility: CLINIC | Age: 81
End: 2021-11-02
Payer: MEDICARE

## 2021-11-02 VITALS
OXYGEN SATURATION: 97 % | DIASTOLIC BLOOD PRESSURE: 82 MMHG | WEIGHT: 147.06 LBS | BODY MASS INDEX: 25.11 KG/M2 | HEART RATE: 58 BPM | SYSTOLIC BLOOD PRESSURE: 142 MMHG | HEIGHT: 64 IN

## 2021-11-02 DIAGNOSIS — F41.9 ANXIETY: ICD-10-CM

## 2021-11-02 DIAGNOSIS — R32 URINARY INCONTINENCE, UNSPECIFIED TYPE: ICD-10-CM

## 2021-11-02 DIAGNOSIS — I25.10 CORONARY ARTERY DISEASE, UNSPECIFIED VESSEL OR LESION TYPE, UNSPECIFIED WHETHER ANGINA PRESENT, UNSPECIFIED WHETHER NATIVE OR TRANSPLANTED HEART: ICD-10-CM

## 2021-11-02 DIAGNOSIS — I50.32 CHRONIC DIASTOLIC HEART FAILURE: ICD-10-CM

## 2021-11-02 DIAGNOSIS — E78.5 HYPERLIPIDEMIA, UNSPECIFIED HYPERLIPIDEMIA TYPE: ICD-10-CM

## 2021-11-02 DIAGNOSIS — Z74.09 OTHER REDUCED MOBILITY: ICD-10-CM

## 2021-11-02 DIAGNOSIS — R91.8 LUNG NODULES: ICD-10-CM

## 2021-11-02 DIAGNOSIS — I95.1 POSTURAL HYPOTENSION: ICD-10-CM

## 2021-11-02 DIAGNOSIS — E21.3 HYPERPARATHYROIDISM: ICD-10-CM

## 2021-11-02 DIAGNOSIS — I25.5 ISCHEMIC CARDIOMYOPATHY: ICD-10-CM

## 2021-11-02 DIAGNOSIS — R94.120 ABNORMAL HEARING SCREEN: ICD-10-CM

## 2021-11-02 DIAGNOSIS — Z99.89 DEPENDENCE ON OTHER ENABLING MACHINES AND DEVICES: ICD-10-CM

## 2021-11-02 DIAGNOSIS — R74.8 ELEVATED LIVER ENZYMES: ICD-10-CM

## 2021-11-02 DIAGNOSIS — D64.9 ANEMIA, UNSPECIFIED TYPE: ICD-10-CM

## 2021-11-02 DIAGNOSIS — G62.0 CHEMOTHERAPY-INDUCED NEUROPATHY: Chronic | ICD-10-CM

## 2021-11-02 DIAGNOSIS — N18.30 STAGE 3 CHRONIC KIDNEY DISEASE, UNSPECIFIED WHETHER STAGE 3A OR 3B CKD: ICD-10-CM

## 2021-11-02 DIAGNOSIS — J84.10 CALCIFIED GRANULOMA OF LUNG: ICD-10-CM

## 2021-11-02 DIAGNOSIS — I10 HYPERTENSION, UNSPECIFIED TYPE: ICD-10-CM

## 2021-11-02 DIAGNOSIS — E03.9 HYPOTHYROIDISM, UNSPECIFIED TYPE: ICD-10-CM

## 2021-11-02 DIAGNOSIS — Z00.00 ENCOUNTER FOR PREVENTIVE HEALTH EXAMINATION: Primary | ICD-10-CM

## 2021-11-02 DIAGNOSIS — T45.1X5A CHEMOTHERAPY-INDUCED NEUROPATHY: Chronic | ICD-10-CM

## 2021-11-02 DIAGNOSIS — R26.9 ABNORMALITY OF GAIT AND MOBILITY: ICD-10-CM

## 2021-11-02 DIAGNOSIS — I48.0 PAF (PAROXYSMAL ATRIAL FIBRILLATION): ICD-10-CM

## 2021-11-02 DIAGNOSIS — Z95.0 PACEMAKER: ICD-10-CM

## 2021-11-02 DIAGNOSIS — F32.9 CHRONIC MAJOR DEPRESSIVE DISORDER: ICD-10-CM

## 2021-11-02 DIAGNOSIS — M85.80 OSTEOPENIA, UNSPECIFIED LOCATION: ICD-10-CM

## 2021-11-02 DIAGNOSIS — C83.30 DIFFUSE LARGE B-CELL LYMPHOMA, UNSPECIFIED BODY REGION: ICD-10-CM

## 2021-11-02 DIAGNOSIS — I70.0 ATHEROSCLEROSIS OF AORTA: ICD-10-CM

## 2021-11-02 PROBLEM — J98.4 CALCIFIED GRANULOMA OF LUNG: Status: ACTIVE | Noted: 2021-11-02

## 2021-11-02 PROCEDURE — 1100F PTFALLS ASSESS-DOCD GE2>/YR: CPT | Mod: HCNC,CPTII,S$GLB, | Performed by: NURSE PRACTITIONER

## 2021-11-02 PROCEDURE — G0439 PR MEDICARE ANNUAL WELLNESS SUBSEQUENT VISIT: ICD-10-PCS | Mod: HCNC,S$GLB,, | Performed by: NURSE PRACTITIONER

## 2021-11-02 PROCEDURE — 3079F DIAST BP 80-89 MM HG: CPT | Mod: HCNC,CPTII,S$GLB, | Performed by: NURSE PRACTITIONER

## 2021-11-02 PROCEDURE — 1100F PR PT FALLS ASSESS DOC 2+ FALLS/FALL W/INJURY/YR: ICD-10-PCS | Mod: HCNC,CPTII,S$GLB, | Performed by: NURSE PRACTITIONER

## 2021-11-02 PROCEDURE — 99499 RISK ADDL DX/OHS AUDIT: ICD-10-PCS | Mod: HCNC,S$GLB,, | Performed by: NURSE PRACTITIONER

## 2021-11-02 PROCEDURE — 1160F PR REVIEW ALL MEDS BY PRESCRIBER/CLIN PHARMACIST DOCUMENTED: ICD-10-PCS | Mod: HCNC,CPTII,S$GLB, | Performed by: NURSE PRACTITIONER

## 2021-11-02 PROCEDURE — 3079F PR MOST RECENT DIASTOLIC BLOOD PRESSURE 80-89 MM HG: ICD-10-PCS | Mod: HCNC,CPTII,S$GLB, | Performed by: NURSE PRACTITIONER

## 2021-11-02 PROCEDURE — 1159F PR MEDICATION LIST DOCUMENTED IN MEDICAL RECORD: ICD-10-PCS | Mod: HCNC,CPTII,S$GLB, | Performed by: NURSE PRACTITIONER

## 2021-11-02 PROCEDURE — G0439 PPPS, SUBSEQ VISIT: HCPCS | Mod: HCNC,S$GLB,, | Performed by: NURSE PRACTITIONER

## 2021-11-02 PROCEDURE — 3288F FALL RISK ASSESSMENT DOCD: CPT | Mod: HCNC,CPTII,S$GLB, | Performed by: NURSE PRACTITIONER

## 2021-11-02 PROCEDURE — 1157F PR ADVANCE CARE PLAN OR EQUIV PRESENT IN MEDICAL RECORD: ICD-10-PCS | Mod: HCNC,CPTII,S$GLB, | Performed by: NURSE PRACTITIONER

## 2021-11-02 PROCEDURE — 1170F PR FUNCTIONAL STATUS ASSESSED: ICD-10-PCS | Mod: HCNC,CPTII,S$GLB, | Performed by: NURSE PRACTITIONER

## 2021-11-02 PROCEDURE — 1126F AMNT PAIN NOTED NONE PRSNT: CPT | Mod: HCNC,CPTII,S$GLB, | Performed by: NURSE PRACTITIONER

## 2021-11-02 PROCEDURE — 3077F PR MOST RECENT SYSTOLIC BLOOD PRESSURE >= 140 MM HG: ICD-10-PCS | Mod: HCNC,CPTII,S$GLB, | Performed by: NURSE PRACTITIONER

## 2021-11-02 PROCEDURE — 99999 PR PBB SHADOW E&M-EST. PATIENT-LVL III: CPT | Mod: PBBFAC,HCNC,, | Performed by: NURSE PRACTITIONER

## 2021-11-02 PROCEDURE — 1160F RVW MEDS BY RX/DR IN RCRD: CPT | Mod: HCNC,CPTII,S$GLB, | Performed by: NURSE PRACTITIONER

## 2021-11-02 PROCEDURE — 3288F PR FALLS RISK ASSESSMENT DOCUMENTED: ICD-10-PCS | Mod: HCNC,CPTII,S$GLB, | Performed by: NURSE PRACTITIONER

## 2021-11-02 PROCEDURE — 1170F FXNL STATUS ASSESSED: CPT | Mod: HCNC,CPTII,S$GLB, | Performed by: NURSE PRACTITIONER

## 2021-11-02 PROCEDURE — 1157F ADVNC CARE PLAN IN RCRD: CPT | Mod: HCNC,CPTII,S$GLB, | Performed by: NURSE PRACTITIONER

## 2021-11-02 PROCEDURE — 1126F PR PAIN SEVERITY QUANTIFIED, NO PAIN PRESENT: ICD-10-PCS | Mod: HCNC,CPTII,S$GLB, | Performed by: NURSE PRACTITIONER

## 2021-11-02 PROCEDURE — 99999 PR PBB SHADOW E&M-EST. PATIENT-LVL III: ICD-10-PCS | Mod: PBBFAC,HCNC,, | Performed by: NURSE PRACTITIONER

## 2021-11-02 PROCEDURE — 1159F MED LIST DOCD IN RCRD: CPT | Mod: HCNC,CPTII,S$GLB, | Performed by: NURSE PRACTITIONER

## 2021-11-02 PROCEDURE — 3077F SYST BP >= 140 MM HG: CPT | Mod: HCNC,CPTII,S$GLB, | Performed by: NURSE PRACTITIONER

## 2021-11-02 PROCEDURE — 99499 UNLISTED E&M SERVICE: CPT | Mod: HCNC,S$GLB,, | Performed by: NURSE PRACTITIONER

## 2021-11-04 ENCOUNTER — OFFICE VISIT (OUTPATIENT)
Dept: CARDIOLOGY | Facility: CLINIC | Age: 81
End: 2021-11-04
Payer: MEDICARE

## 2021-11-04 ENCOUNTER — HOSPITAL ENCOUNTER (OUTPATIENT)
Dept: CARDIOLOGY | Facility: HOSPITAL | Age: 81
Discharge: HOME | DRG: 291 | End: 2021-11-04
Attending: INTERNAL MEDICINE
Payer: MEDICARE

## 2021-11-04 VITALS
OXYGEN SATURATION: 94 % | HEART RATE: 83 BPM | BODY MASS INDEX: 24.84 KG/M2 | DIASTOLIC BLOOD PRESSURE: 88 MMHG | SYSTOLIC BLOOD PRESSURE: 142 MMHG | WEIGHT: 145.5 LBS | HEIGHT: 64 IN

## 2021-11-04 DIAGNOSIS — R06.02 SOB (SHORTNESS OF BREATH): Primary | ICD-10-CM

## 2021-11-04 DIAGNOSIS — I10 ESSENTIAL HYPERTENSION: Primary | Chronic | ICD-10-CM

## 2021-11-04 DIAGNOSIS — I25.5 ISCHEMIC CARDIOMYOPATHY: Chronic | ICD-10-CM

## 2021-11-04 DIAGNOSIS — E03.9 HYPOTHYROIDISM (ACQUIRED): Chronic | ICD-10-CM

## 2021-11-04 DIAGNOSIS — E78.2 MIXED HYPERLIPIDEMIA: Chronic | ICD-10-CM

## 2021-11-04 DIAGNOSIS — C83.30 DIFFUSE LARGE B-CELL LYMPHOMA, UNSPECIFIED BODY REGION: ICD-10-CM

## 2021-11-04 DIAGNOSIS — Z95.0 CARDIAC PACEMAKER IN SITU: ICD-10-CM

## 2021-11-04 DIAGNOSIS — C85.83 LARGE CELL LYMPHOMA OF INTRA-ABDOMINAL LYMPH NODES: Chronic | ICD-10-CM

## 2021-11-04 DIAGNOSIS — D64.9 CHRONIC ANEMIA: Chronic | ICD-10-CM

## 2021-11-04 DIAGNOSIS — E21.3 HYPERPARATHYROIDISM: ICD-10-CM

## 2021-11-04 DIAGNOSIS — N18.30 STAGE 3 CHRONIC KIDNEY DISEASE, UNSPECIFIED WHETHER STAGE 3A OR 3B CKD: Chronic | ICD-10-CM

## 2021-11-04 DIAGNOSIS — R94.2 DIFFUSION CAPACITY OF LUNG (DL), DECREASED: ICD-10-CM

## 2021-11-04 DIAGNOSIS — Z95.0 PACEMAKER: Chronic | ICD-10-CM

## 2021-11-04 DIAGNOSIS — T45.1X5A CHEMOTHERAPY-INDUCED NEUROPATHY: Chronic | ICD-10-CM

## 2021-11-04 DIAGNOSIS — I48.0 PAROXYSMAL ATRIAL FIBRILLATION: Chronic | ICD-10-CM

## 2021-11-04 DIAGNOSIS — G62.0 CHEMOTHERAPY-INDUCED NEUROPATHY: Chronic | ICD-10-CM

## 2021-11-04 DIAGNOSIS — R07.9 CHEST PAIN SYNDROME: ICD-10-CM

## 2021-11-04 DIAGNOSIS — I70.0 CALCIFICATION OF AORTA: Chronic | ICD-10-CM

## 2021-11-04 DIAGNOSIS — I25.10 CORONARY ARTERY DISEASE INVOLVING NATIVE CORONARY ARTERY OF NATIVE HEART WITHOUT ANGINA PECTORIS: Chronic | ICD-10-CM

## 2021-11-04 DIAGNOSIS — Z86.79 HISTORY OF CONGESTIVE HEART FAILURE: ICD-10-CM

## 2021-11-04 DIAGNOSIS — R79.89 ABNORMAL LFTS (LIVER FUNCTION TESTS): ICD-10-CM

## 2021-11-04 DIAGNOSIS — M15.9 PRIMARY OSTEOARTHRITIS INVOLVING MULTIPLE JOINTS: Chronic | ICD-10-CM

## 2021-11-04 DIAGNOSIS — R06.02 SOB (SHORTNESS OF BREATH): ICD-10-CM

## 2021-11-04 DIAGNOSIS — Z96.653 S/P TKR (TOTAL KNEE REPLACEMENT), BILATERAL: Chronic | ICD-10-CM

## 2021-11-04 DIAGNOSIS — Z98.84 S/P GASTRIC BYPASS: Chronic | ICD-10-CM

## 2021-11-04 PROCEDURE — 93010 EKG 12-LEAD: ICD-10-PCS | Mod: HCNC,,, | Performed by: INTERNAL MEDICINE

## 2021-11-04 PROCEDURE — 3079F DIAST BP 80-89 MM HG: CPT | Mod: HCNC,CPTII,S$GLB, | Performed by: INTERNAL MEDICINE

## 2021-11-04 PROCEDURE — 1157F PR ADVANCE CARE PLAN OR EQUIV PRESENT IN MEDICAL RECORD: ICD-10-PCS | Mod: HCNC,CPTII,S$GLB, | Performed by: INTERNAL MEDICINE

## 2021-11-04 PROCEDURE — 99999 PR PBB SHADOW E&M-EST. PATIENT-LVL V: ICD-10-PCS | Mod: PBBFAC,HCNC,, | Performed by: INTERNAL MEDICINE

## 2021-11-04 PROCEDURE — 1160F RVW MEDS BY RX/DR IN RCRD: CPT | Mod: HCNC,CPTII,S$GLB, | Performed by: INTERNAL MEDICINE

## 2021-11-04 PROCEDURE — 3288F PR FALLS RISK ASSESSMENT DOCUMENTED: ICD-10-PCS | Mod: HCNC,CPTII,S$GLB, | Performed by: INTERNAL MEDICINE

## 2021-11-04 PROCEDURE — 1100F PTFALLS ASSESS-DOCD GE2>/YR: CPT | Mod: HCNC,CPTII,S$GLB, | Performed by: INTERNAL MEDICINE

## 2021-11-04 PROCEDURE — 3077F PR MOST RECENT SYSTOLIC BLOOD PRESSURE >= 140 MM HG: ICD-10-PCS | Mod: HCNC,CPTII,S$GLB, | Performed by: INTERNAL MEDICINE

## 2021-11-04 PROCEDURE — 93005 ELECTROCARDIOGRAM TRACING: CPT | Mod: HCNC

## 2021-11-04 PROCEDURE — 1126F AMNT PAIN NOTED NONE PRSNT: CPT | Mod: HCNC,CPTII,S$GLB, | Performed by: INTERNAL MEDICINE

## 2021-11-04 PROCEDURE — 1157F ADVNC CARE PLAN IN RCRD: CPT | Mod: HCNC,CPTII,S$GLB, | Performed by: INTERNAL MEDICINE

## 2021-11-04 PROCEDURE — 1159F MED LIST DOCD IN RCRD: CPT | Mod: HCNC,CPTII,S$GLB, | Performed by: INTERNAL MEDICINE

## 2021-11-04 PROCEDURE — 1100F PR PT FALLS ASSESS DOC 2+ FALLS/FALL W/INJURY/YR: ICD-10-PCS | Mod: HCNC,CPTII,S$GLB, | Performed by: INTERNAL MEDICINE

## 2021-11-04 PROCEDURE — 3079F PR MOST RECENT DIASTOLIC BLOOD PRESSURE 80-89 MM HG: ICD-10-PCS | Mod: HCNC,CPTII,S$GLB, | Performed by: INTERNAL MEDICINE

## 2021-11-04 PROCEDURE — 99999 PR PBB SHADOW E&M-EST. PATIENT-LVL V: CPT | Mod: PBBFAC,HCNC,, | Performed by: INTERNAL MEDICINE

## 2021-11-04 PROCEDURE — 1160F PR REVIEW ALL MEDS BY PRESCRIBER/CLIN PHARMACIST DOCUMENTED: ICD-10-PCS | Mod: HCNC,CPTII,S$GLB, | Performed by: INTERNAL MEDICINE

## 2021-11-04 PROCEDURE — 93010 ELECTROCARDIOGRAM REPORT: CPT | Mod: HCNC,,, | Performed by: INTERNAL MEDICINE

## 2021-11-04 PROCEDURE — 1159F PR MEDICATION LIST DOCUMENTED IN MEDICAL RECORD: ICD-10-PCS | Mod: HCNC,CPTII,S$GLB, | Performed by: INTERNAL MEDICINE

## 2021-11-04 PROCEDURE — 99214 PR OFFICE/OUTPT VISIT, EST, LEVL IV, 30-39 MIN: ICD-10-PCS | Mod: HCNC,S$GLB,, | Performed by: INTERNAL MEDICINE

## 2021-11-04 PROCEDURE — 3077F SYST BP >= 140 MM HG: CPT | Mod: HCNC,CPTII,S$GLB, | Performed by: INTERNAL MEDICINE

## 2021-11-04 PROCEDURE — 3288F FALL RISK ASSESSMENT DOCD: CPT | Mod: HCNC,CPTII,S$GLB, | Performed by: INTERNAL MEDICINE

## 2021-11-04 PROCEDURE — 1126F PR PAIN SEVERITY QUANTIFIED, NO PAIN PRESENT: ICD-10-PCS | Mod: HCNC,CPTII,S$GLB, | Performed by: INTERNAL MEDICINE

## 2021-11-04 PROCEDURE — 99214 OFFICE O/P EST MOD 30 MIN: CPT | Mod: HCNC,S$GLB,, | Performed by: INTERNAL MEDICINE

## 2021-11-04 RX ORDER — NITROGLYCERIN 40 MG/1
1 PATCH TRANSDERMAL DAILY
Qty: 30 PATCH | Refills: 11 | Status: SHIPPED | OUTPATIENT
Start: 2021-11-04 | End: 2024-01-04 | Stop reason: SDUPTHER

## 2021-11-05 ENCOUNTER — HOSPITAL ENCOUNTER (INPATIENT)
Facility: HOSPITAL | Age: 81
LOS: 2 days | Discharge: HOME | DRG: 291 | End: 2021-11-09
Attending: EMERGENCY MEDICINE
Payer: MEDICARE

## 2021-11-05 ENCOUNTER — OFFICE VISIT (OUTPATIENT)
Dept: CARDIOLOGY | Facility: CLINIC | Age: 81
End: 2021-11-05
Payer: MEDICARE

## 2021-11-05 ENCOUNTER — HOSPITAL ENCOUNTER (OUTPATIENT)
Dept: CARDIOLOGY | Facility: HOSPITAL | Age: 81
Discharge: HOME | DRG: 291 | End: 2021-11-05
Attending: INTERNAL MEDICINE
Payer: MEDICARE

## 2021-11-05 ENCOUNTER — DOCUMENTATION ONLY (OUTPATIENT)
Dept: CARDIOLOGY | Facility: HOSPITAL | Age: 81
End: 2021-11-05
Payer: MEDICARE

## 2021-11-05 VITALS — BODY MASS INDEX: 24.75 KG/M2 | WEIGHT: 145 LBS | HEIGHT: 64 IN

## 2021-11-05 VITALS
HEART RATE: 91 BPM | WEIGHT: 145 LBS | OXYGEN SATURATION: 100 % | DIASTOLIC BLOOD PRESSURE: 94 MMHG | BODY MASS INDEX: 24.75 KG/M2 | HEIGHT: 64 IN | SYSTOLIC BLOOD PRESSURE: 154 MMHG

## 2021-11-05 DIAGNOSIS — I10 ESSENTIAL HYPERTENSION: Primary | Chronic | ICD-10-CM

## 2021-11-05 DIAGNOSIS — E78.2 MIXED HYPERLIPIDEMIA: Chronic | ICD-10-CM

## 2021-11-05 DIAGNOSIS — I25.5 ISCHEMIC CARDIOMYOPATHY: Chronic | ICD-10-CM

## 2021-11-05 DIAGNOSIS — I25.10 CORONARY ARTERY DISEASE INVOLVING NATIVE CORONARY ARTERY OF NATIVE HEART WITHOUT ANGINA PECTORIS: Chronic | ICD-10-CM

## 2021-11-05 DIAGNOSIS — I20.9 ANGINA PECTORIS: ICD-10-CM

## 2021-11-05 DIAGNOSIS — Z95.0 PACEMAKER: Primary | ICD-10-CM

## 2021-11-05 DIAGNOSIS — I50.43 ACUTE ON CHRONIC COMBINED SYSTOLIC AND DIASTOLIC CONGESTIVE HEART FAILURE: ICD-10-CM

## 2021-11-05 DIAGNOSIS — I48.0 PAROXYSMAL ATRIAL FIBRILLATION: Chronic | ICD-10-CM

## 2021-11-05 DIAGNOSIS — Z86.79 HISTORY OF CONGESTIVE HEART FAILURE: ICD-10-CM

## 2021-11-05 DIAGNOSIS — F32.9 MAJOR DEPRESSION, CHRONIC: Chronic | ICD-10-CM

## 2021-11-05 DIAGNOSIS — R07.9 CHEST PAIN: ICD-10-CM

## 2021-11-05 DIAGNOSIS — D64.9 CHRONIC ANEMIA: ICD-10-CM

## 2021-11-05 DIAGNOSIS — I25.5 ISCHEMIC CARDIOMYOPATHY: ICD-10-CM

## 2021-11-05 DIAGNOSIS — I95.1 POSTURAL HYPOTENSION: ICD-10-CM

## 2021-11-05 DIAGNOSIS — I48.0 PAROXYSMAL ATRIAL FIBRILLATION: ICD-10-CM

## 2021-11-05 DIAGNOSIS — Z96.653 S/P TKR (TOTAL KNEE REPLACEMENT), BILATERAL: Chronic | ICD-10-CM

## 2021-11-05 DIAGNOSIS — I50.9 ACUTE CONGESTIVE HEART FAILURE, UNSPECIFIED HEART FAILURE TYPE: Primary | ICD-10-CM

## 2021-11-05 DIAGNOSIS — Z95.0 PACEMAKER: Chronic | ICD-10-CM

## 2021-11-05 DIAGNOSIS — R91.8 LUNG NODULES: ICD-10-CM

## 2021-11-05 DIAGNOSIS — R94.2 DIFFUSION CAPACITY OF LUNG (DL), DECREASED: ICD-10-CM

## 2021-11-05 DIAGNOSIS — I25.10 CORONARY ARTERY DISEASE INVOLVING NATIVE CORONARY ARTERY OF NATIVE HEART WITHOUT ANGINA PECTORIS: ICD-10-CM

## 2021-11-05 DIAGNOSIS — R07.9 CHEST PAIN SYNDROME: ICD-10-CM

## 2021-11-05 DIAGNOSIS — J84.10 CALCIFIED GRANULOMA OF LUNG: ICD-10-CM

## 2021-11-05 DIAGNOSIS — N18.30 STAGE 3 CHRONIC KIDNEY DISEASE, UNSPECIFIED WHETHER STAGE 3A OR 3B CKD: Chronic | ICD-10-CM

## 2021-11-05 DIAGNOSIS — I70.0 CALCIFICATION OF AORTA: Chronic | ICD-10-CM

## 2021-11-05 PROBLEM — I48.19 PERSISTENT ATRIAL FIBRILLATION: Status: ACTIVE | Noted: 2018-03-15

## 2021-11-05 LAB
ALBUMIN SERPL BCP-MCNC: 3.6 G/DL (ref 3.5–5.2)
ALP SERPL-CCNC: 60 U/L (ref 55–135)
ALT SERPL W/O P-5'-P-CCNC: 31 U/L (ref 10–44)
ANION GAP SERPL CALC-SCNC: 11 MMOL/L (ref 8–16)
AORTIC ROOT ANNULUS: 2.85 CM
AST SERPL-CCNC: 37 U/L (ref 10–40)
AV INDEX (PROSTH): 0.43
AV MEAN GRADIENT: 8 MMHG
AV PEAK GRADIENT: 14 MMHG
AV VALVE AREA: 1.31 CM2
AV VELOCITY RATIO: 0.42
BASOPHILS # BLD AUTO: 0.02 K/UL (ref 0–0.2)
BASOPHILS NFR BLD: 0.3 % (ref 0–1.9)
BILIRUB SERPL-MCNC: 0.4 MG/DL (ref 0.1–1)
BNP SERPL-MCNC: 1340 PG/ML (ref 0–99)
BSA FOR ECHO PROCEDURE: 1.72 M2
BUN SERPL-MCNC: 29 MG/DL (ref 8–23)
CALCIUM SERPL-MCNC: 9.5 MG/DL (ref 8.7–10.5)
CHLORIDE SERPL-SCNC: 110 MMOL/L (ref 95–110)
CO2 SERPL-SCNC: 22 MMOL/L (ref 23–29)
CREAT SERPL-MCNC: 1.6 MG/DL (ref 0.5–1.4)
CV ECHO LV RWT: 0.4 CM
DIFFERENTIAL METHOD: ABNORMAL
DOP CALC AO PEAK VEL: 1.89 M/S
DOP CALC AO VTI: 44.2 CM
DOP CALC LVOT AREA: 3 CM2
DOP CALC LVOT DIAMETER: 1.97 CM
DOP CALC LVOT PEAK VEL: 0.8 M/S
DOP CALC LVOT STROKE VOLUME: 57.94 CM3
DOP CALC RVOT PEAK VEL: 0.73 M/S
DOP CALC RVOT VTI: 14.82 CM
DOP CALCLVOT PEAK VEL VTI: 19.02 CM
E WAVE DECELERATION TIME: 117.04 MSEC
E/A RATIO: 2.83
ECHO LV POSTERIOR WALL: 1 CM (ref 0.6–1.1)
EJECTION FRACTION: 40 %
EOSINOPHIL # BLD AUTO: 0.2 K/UL (ref 0–0.5)
EOSINOPHIL NFR BLD: 2.4 % (ref 0–8)
ERYTHROCYTE [DISTWIDTH] IN BLOOD BY AUTOMATED COUNT: 16.2 % (ref 11.5–14.5)
EST. GFR  (AFRICAN AMERICAN): 35 ML/MIN/1.73 M^2
EST. GFR  (NON AFRICAN AMERICAN): 30 ML/MIN/1.73 M^2
FRACTIONAL SHORTENING: 23 % (ref 28–44)
GLUCOSE SERPL-MCNC: 71 MG/DL (ref 70–110)
HCT VFR BLD AUTO: 31.5 % (ref 37–48.5)
HGB BLD-MCNC: 9.9 G/DL (ref 12–16)
IMM GRANULOCYTES # BLD AUTO: 0.03 K/UL (ref 0–0.04)
IMM GRANULOCYTES NFR BLD AUTO: 0.4 % (ref 0–0.5)
INTERVENTRICULAR SEPTUM: 0.94 CM (ref 0.6–1.1)
IVRT: 91.34 MSEC
LA MAJOR: 6.85 CM
LA MINOR: 5.87 CM
LA WIDTH: 4.66 CM
LEFT ATRIUM SIZE: 5.26 CM
LEFT ATRIUM VOLUME INDEX: 77.5 ML/M2
LEFT ATRIUM VOLUME: 131.72 CM3
LEFT INTERNAL DIMENSION IN SYSTOLE: 3.86 CM (ref 2.1–4)
LEFT VENTRICLE DIASTOLIC VOLUME INDEX: 69.94 ML/M2
LEFT VENTRICLE DIASTOLIC VOLUME: 118.9 ML
LEFT VENTRICLE MASS INDEX: 103 G/M2
LEFT VENTRICLE SYSTOLIC VOLUME INDEX: 37.8 ML/M2
LEFT VENTRICLE SYSTOLIC VOLUME: 64.2 ML
LEFT VENTRICULAR INTERNAL DIMENSION IN DIASTOLE: 5.01 CM (ref 3.5–6)
LEFT VENTRICULAR MASS: 175.28 G
LV SEPTAL E/E' RATIO: 30 M/S
LYMPHOCYTES # BLD AUTO: 3.3 K/UL (ref 1–4.8)
LYMPHOCYTES NFR BLD: 44.6 % (ref 18–48)
MCH RBC QN AUTO: 32.9 PG (ref 27–31)
MCHC RBC AUTO-ENTMCNC: 31.4 G/DL (ref 32–36)
MCV RBC AUTO: 105 FL (ref 82–98)
MONOCYTES # BLD AUTO: 0.7 K/UL (ref 0.3–1)
MONOCYTES NFR BLD: 8.9 % (ref 4–15)
MV PEAK A VEL: 0.53 M/S
MV PEAK E VEL: 1.5 M/S
NEUTROPHILS # BLD AUTO: 3.2 K/UL (ref 1.8–7.7)
NEUTROPHILS NFR BLD: 43.4 % (ref 38–73)
NRBC BLD-RTO: 0 /100 WBC
PISA MRMAX VEL: 0.06 M/S
PISA TR MAX VEL: 3.45 M/S
PLATELET # BLD AUTO: 199 K/UL (ref 150–450)
PMV BLD AUTO: 9.8 FL (ref 9.2–12.9)
POTASSIUM SERPL-SCNC: 4.4 MMOL/L (ref 3.5–5.1)
PROT SERPL-MCNC: 6.8 G/DL (ref 6–8.4)
PV MEAN GRADIENT: 1 MMHG
RA MAJOR: 5.2 CM
RA PRESSURE: 8 MMHG
RA WIDTH: 4.01 CM
RBC # BLD AUTO: 3.01 M/UL (ref 4–5.4)
RIGHT VENTRICULAR END-DIASTOLIC DIMENSION: 2.89 CM
SINUS: 2.72 CM
SODIUM SERPL-SCNC: 143 MMOL/L (ref 136–145)
STJ: 2.26 CM
TDI SEPTAL: 0.05 M/S
TR MAX PG: 48 MMHG
TRICUSPID ANNULAR PLANE SYSTOLIC EXCURSION: 2.17 CM
TROPONIN I SERPL DL<=0.01 NG/ML-MCNC: 0.02 NG/ML (ref 0–0.03)
TV REST PULMONARY ARTERY PRESSURE: 56 MMHG
WBC # BLD AUTO: 7.4 K/UL (ref 3.9–12.7)

## 2021-11-05 PROCEDURE — 3077F PR MOST RECENT SYSTOLIC BLOOD PRESSURE >= 140 MM HG: ICD-10-PCS | Mod: HCNC,CPTII,S$GLB, | Performed by: INTERNAL MEDICINE

## 2021-11-05 PROCEDURE — 63600175 PHARM REV CODE 636 W HCPCS: Mod: HCNC | Performed by: NURSE PRACTITIONER

## 2021-11-05 PROCEDURE — 84484 ASSAY OF TROPONIN QUANT: CPT | Mod: HCNC | Performed by: EMERGENCY MEDICINE

## 2021-11-05 PROCEDURE — 25000003 PHARM REV CODE 250: Mod: HCNC | Performed by: NURSE PRACTITIONER

## 2021-11-05 PROCEDURE — 93306 ECHO (CUPID ONLY): ICD-10-PCS | Mod: 26,HCNC,, | Performed by: STUDENT IN AN ORGANIZED HEALTH CARE EDUCATION/TRAINING PROGRAM

## 2021-11-05 PROCEDURE — 93005 ELECTROCARDIOGRAM TRACING: CPT | Mod: HCNC

## 2021-11-05 PROCEDURE — 3080F DIAST BP >= 90 MM HG: CPT | Mod: HCNC,CPTII,S$GLB, | Performed by: INTERNAL MEDICINE

## 2021-11-05 PROCEDURE — 99999 PR PBB SHADOW E&M-EST. PATIENT-LVL IV: ICD-10-PCS | Mod: PBBFAC,HCNC,, | Performed by: INTERNAL MEDICINE

## 2021-11-05 PROCEDURE — 1159F PR MEDICATION LIST DOCUMENTED IN MEDICAL RECORD: ICD-10-PCS | Mod: HCNC,CPTII,S$GLB, | Performed by: INTERNAL MEDICINE

## 2021-11-05 PROCEDURE — 93306 TTE W/DOPPLER COMPLETE: CPT | Mod: 26,HCNC,, | Performed by: STUDENT IN AN ORGANIZED HEALTH CARE EDUCATION/TRAINING PROGRAM

## 2021-11-05 PROCEDURE — G0378 HOSPITAL OBSERVATION PER HR: HCPCS | Mod: HCNC

## 2021-11-05 PROCEDURE — 1160F PR REVIEW ALL MEDS BY PRESCRIBER/CLIN PHARMACIST DOCUMENTED: ICD-10-PCS | Mod: HCNC,CPTII,S$GLB, | Performed by: INTERNAL MEDICINE

## 2021-11-05 PROCEDURE — 85025 COMPLETE CBC W/AUTO DIFF WBC: CPT | Mod: HCNC | Performed by: EMERGENCY MEDICINE

## 2021-11-05 PROCEDURE — 1159F MED LIST DOCD IN RCRD: CPT | Mod: HCNC,CPTII,S$GLB, | Performed by: INTERNAL MEDICINE

## 2021-11-05 PROCEDURE — 99214 OFFICE O/P EST MOD 30 MIN: CPT | Mod: HCNC,S$GLB,, | Performed by: INTERNAL MEDICINE

## 2021-11-05 PROCEDURE — 93010 EKG 12-LEAD: ICD-10-PCS | Mod: HCNC,,, | Performed by: INTERNAL MEDICINE

## 2021-11-05 PROCEDURE — 1157F ADVNC CARE PLAN IN RCRD: CPT | Mod: HCNC,CPTII,S$GLB, | Performed by: INTERNAL MEDICINE

## 2021-11-05 PROCEDURE — 63600175 PHARM REV CODE 636 W HCPCS: Mod: HCNC | Performed by: EMERGENCY MEDICINE

## 2021-11-05 PROCEDURE — 96372 THER/PROPH/DIAG INJ SC/IM: CPT | Mod: HCNC

## 2021-11-05 PROCEDURE — 99214 PR OFFICE/OUTPT VISIT, EST, LEVL IV, 30-39 MIN: ICD-10-PCS | Mod: HCNC,S$GLB,, | Performed by: INTERNAL MEDICINE

## 2021-11-05 PROCEDURE — 99999 PR PBB SHADOW E&M-EST. PATIENT-LVL IV: CPT | Mod: PBBFAC,HCNC,, | Performed by: INTERNAL MEDICINE

## 2021-11-05 PROCEDURE — 96374 THER/PROPH/DIAG INJ IV PUSH: CPT | Mod: HCNC

## 2021-11-05 PROCEDURE — 1157F PR ADVANCE CARE PLAN OR EQUIV PRESENT IN MEDICAL RECORD: ICD-10-PCS | Mod: HCNC,CPTII,S$GLB, | Performed by: INTERNAL MEDICINE

## 2021-11-05 PROCEDURE — 80053 COMPREHEN METABOLIC PANEL: CPT | Mod: HCNC | Performed by: EMERGENCY MEDICINE

## 2021-11-05 PROCEDURE — 99285 EMERGENCY DEPT VISIT HI MDM: CPT | Mod: 25,HCNC

## 2021-11-05 PROCEDURE — 93010 ELECTROCARDIOGRAM REPORT: CPT | Mod: HCNC,,, | Performed by: INTERNAL MEDICINE

## 2021-11-05 PROCEDURE — 3077F SYST BP >= 140 MM HG: CPT | Mod: HCNC,CPTII,S$GLB, | Performed by: INTERNAL MEDICINE

## 2021-11-05 PROCEDURE — 3080F PR MOST RECENT DIASTOLIC BLOOD PRESSURE >= 90 MM HG: ICD-10-PCS | Mod: HCNC,CPTII,S$GLB, | Performed by: INTERNAL MEDICINE

## 2021-11-05 PROCEDURE — 96375 TX/PRO/DX INJ NEW DRUG ADDON: CPT | Mod: HCNC

## 2021-11-05 PROCEDURE — 93306 TTE W/DOPPLER COMPLETE: CPT | Mod: HCNC

## 2021-11-05 PROCEDURE — 96376 TX/PRO/DX INJ SAME DRUG ADON: CPT | Mod: HCNC

## 2021-11-05 PROCEDURE — 83880 ASSAY OF NATRIURETIC PEPTIDE: CPT | Mod: HCNC | Performed by: EMERGENCY MEDICINE

## 2021-11-05 PROCEDURE — 1160F RVW MEDS BY RX/DR IN RCRD: CPT | Mod: HCNC,CPTII,S$GLB, | Performed by: INTERNAL MEDICINE

## 2021-11-05 RX ORDER — SERTRALINE HYDROCHLORIDE 50 MG/1
150 TABLET, FILM COATED ORAL DAILY
Status: DISCONTINUED | OUTPATIENT
Start: 2021-11-06 | End: 2021-11-09 | Stop reason: HOSPADM

## 2021-11-05 RX ORDER — MIRTAZAPINE 7.5 MG/1
15 TABLET, FILM COATED ORAL NIGHTLY
Status: DISCONTINUED | OUTPATIENT
Start: 2021-11-05 | End: 2021-11-09 | Stop reason: HOSPADM

## 2021-11-05 RX ORDER — CALCITRIOL 0.25 UG/1
0.25 CAPSULE ORAL
Status: DISCONTINUED | OUTPATIENT
Start: 2021-11-08 | End: 2021-11-09 | Stop reason: HOSPADM

## 2021-11-05 RX ORDER — ONDANSETRON 4 MG/1
4 TABLET, ORALLY DISINTEGRATING ORAL EVERY 8 HOURS PRN
Status: DISCONTINUED | OUTPATIENT
Start: 2021-11-05 | End: 2021-11-09 | Stop reason: HOSPADM

## 2021-11-05 RX ORDER — CLOPIDOGREL BISULFATE 75 MG/1
75 TABLET ORAL DAILY
Status: DISCONTINUED | OUTPATIENT
Start: 2021-11-06 | End: 2021-11-09 | Stop reason: HOSPADM

## 2021-11-05 RX ORDER — PRAVASTATIN SODIUM 20 MG/1
40 TABLET ORAL DAILY
Status: DISCONTINUED | OUTPATIENT
Start: 2021-11-06 | End: 2021-11-09 | Stop reason: HOSPADM

## 2021-11-05 RX ORDER — FUROSEMIDE 10 MG/ML
40 INJECTION INTRAMUSCULAR; INTRAVENOUS
Status: DISCONTINUED | OUTPATIENT
Start: 2021-11-05 | End: 2021-11-08

## 2021-11-05 RX ORDER — SODIUM BICARBONATE 650 MG/1
650 TABLET ORAL 2 TIMES DAILY
Status: DISCONTINUED | OUTPATIENT
Start: 2021-11-05 | End: 2021-11-09 | Stop reason: HOSPADM

## 2021-11-05 RX ORDER — ASPIRIN 81 MG/1
81 TABLET ORAL NIGHTLY
Status: DISCONTINUED | OUTPATIENT
Start: 2021-11-05 | End: 2021-11-09 | Stop reason: HOSPADM

## 2021-11-05 RX ORDER — FUROSEMIDE 10 MG/ML
60 INJECTION INTRAMUSCULAR; INTRAVENOUS
Status: COMPLETED | OUTPATIENT
Start: 2021-11-05 | End: 2021-11-05

## 2021-11-05 RX ORDER — ALLOPURINOL 100 MG/1
300 TABLET ORAL DAILY
Status: DISCONTINUED | OUTPATIENT
Start: 2021-11-06 | End: 2021-11-09 | Stop reason: HOSPADM

## 2021-11-05 RX ORDER — HEPARIN SODIUM 5000 [USP'U]/ML
5000 INJECTION, SOLUTION INTRAVENOUS; SUBCUTANEOUS EVERY 8 HOURS
Status: DISCONTINUED | OUTPATIENT
Start: 2021-11-05 | End: 2021-11-06

## 2021-11-05 RX ORDER — DULOXETIN HYDROCHLORIDE 30 MG/1
30 CAPSULE, DELAYED RELEASE ORAL DAILY
COMMUNITY
Start: 2021-10-28 | End: 2022-04-26

## 2021-11-05 RX ORDER — SODIUM CHLORIDE 0.9 % (FLUSH) 0.9 %
10 SYRINGE (ML) INJECTION
Status: DISCONTINUED | OUTPATIENT
Start: 2021-11-05 | End: 2021-11-09 | Stop reason: HOSPADM

## 2021-11-05 RX ORDER — METOPROLOL TARTRATE 25 MG/1
25 TABLET, FILM COATED ORAL 2 TIMES DAILY
Status: DISCONTINUED | OUTPATIENT
Start: 2021-11-05 | End: 2021-11-09 | Stop reason: HOSPADM

## 2021-11-05 RX ORDER — DULOXETIN HYDROCHLORIDE 30 MG/1
30 CAPSULE, DELAYED RELEASE ORAL DAILY
Status: DISCONTINUED | OUTPATIENT
Start: 2021-11-06 | End: 2021-11-09 | Stop reason: HOSPADM

## 2021-11-05 RX ORDER — GABAPENTIN 300 MG/1
300 CAPSULE ORAL NIGHTLY
Status: DISCONTINUED | OUTPATIENT
Start: 2021-11-05 | End: 2021-11-09 | Stop reason: HOSPADM

## 2021-11-05 RX ORDER — NITROGLYCERIN 40 MG/1
1 PATCH TRANSDERMAL DAILY
Status: DISCONTINUED | OUTPATIENT
Start: 2021-11-06 | End: 2021-11-09 | Stop reason: HOSPADM

## 2021-11-05 RX ADMIN — FUROSEMIDE 60 MG: 10 INJECTION, SOLUTION INTRAVENOUS at 05:11

## 2021-11-05 RX ADMIN — MIRTAZAPINE 15 MG: 7.5 TABLET ORAL at 09:11

## 2021-11-05 RX ADMIN — ASPIRIN 81 MG: 81 TABLET, COATED ORAL at 09:11

## 2021-11-05 RX ADMIN — METOPROLOL TARTRATE 25 MG: 25 TABLET, FILM COATED ORAL at 09:11

## 2021-11-05 RX ADMIN — BUSPIRONE HYDROCHLORIDE 7.5 MG: 5 TABLET ORAL at 09:11

## 2021-11-05 RX ADMIN — GABAPENTIN 300 MG: 300 CAPSULE ORAL at 09:11

## 2021-11-05 RX ADMIN — HEPARIN SODIUM 5000 UNITS: 5000 INJECTION INTRAVENOUS; SUBCUTANEOUS at 09:11

## 2021-11-05 RX ADMIN — FUROSEMIDE 40 MG: 10 INJECTION, SOLUTION INTRAVENOUS at 09:11

## 2021-11-05 RX ADMIN — SODIUM BICARBONATE 650 MG: 650 TABLET ORAL at 09:11

## 2021-11-06 LAB
ANION GAP SERPL CALC-SCNC: 12 MMOL/L (ref 8–16)
APTT BLDCRRT: 26.3 SEC (ref 21–32)
APTT BLDCRRT: 47.7 SEC (ref 21–32)
BASOPHILS # BLD AUTO: 0.02 K/UL (ref 0–0.2)
BASOPHILS NFR BLD: 0.3 % (ref 0–1.9)
BUN SERPL-MCNC: 28 MG/DL (ref 8–23)
CALCIUM SERPL-MCNC: 9.5 MG/DL (ref 8.7–10.5)
CHLORIDE SERPL-SCNC: 106 MMOL/L (ref 95–110)
CO2 SERPL-SCNC: 25 MMOL/L (ref 23–29)
CREAT SERPL-MCNC: 1.6 MG/DL (ref 0.5–1.4)
DIFFERENTIAL METHOD: ABNORMAL
EOSINOPHIL # BLD AUTO: 0.2 K/UL (ref 0–0.5)
EOSINOPHIL NFR BLD: 3.1 % (ref 0–8)
ERYTHROCYTE [DISTWIDTH] IN BLOOD BY AUTOMATED COUNT: 16.1 % (ref 11.5–14.5)
EST. GFR  (AFRICAN AMERICAN): 35 ML/MIN/1.73 M^2
EST. GFR  (NON AFRICAN AMERICAN): 30 ML/MIN/1.73 M^2
GLUCOSE SERPL-MCNC: 85 MG/DL (ref 70–110)
HCT VFR BLD AUTO: 33.6 % (ref 37–48.5)
HGB BLD-MCNC: 10.7 G/DL (ref 12–16)
IMM GRANULOCYTES # BLD AUTO: 0.03 K/UL (ref 0–0.04)
IMM GRANULOCYTES NFR BLD AUTO: 0.5 % (ref 0–0.5)
INR PPP: 1 (ref 0.8–1.2)
LYMPHOCYTES # BLD AUTO: 3.3 K/UL (ref 1–4.8)
LYMPHOCYTES NFR BLD: 50.5 % (ref 18–48)
MAGNESIUM SERPL-MCNC: 1.7 MG/DL (ref 1.6–2.6)
MCH RBC QN AUTO: 32.5 PG (ref 27–31)
MCHC RBC AUTO-ENTMCNC: 31.8 G/DL (ref 32–36)
MCV RBC AUTO: 102 FL (ref 82–98)
MONOCYTES # BLD AUTO: 0.5 K/UL (ref 0.3–1)
MONOCYTES NFR BLD: 7 % (ref 4–15)
NEUTROPHILS # BLD AUTO: 2.5 K/UL (ref 1.8–7.7)
NEUTROPHILS NFR BLD: 38.6 % (ref 38–73)
NRBC BLD-RTO: 0 /100 WBC
PLATELET # BLD AUTO: 207 K/UL (ref 150–450)
PMV BLD AUTO: 10.1 FL (ref 9.2–12.9)
POTASSIUM SERPL-SCNC: 4.6 MMOL/L (ref 3.5–5.1)
PROTHROMBIN TIME: 10.8 SEC (ref 9–12.5)
RBC # BLD AUTO: 3.29 M/UL (ref 4–5.4)
SODIUM SERPL-SCNC: 143 MMOL/L (ref 136–145)
TROPONIN I SERPL DL<=0.01 NG/ML-MCNC: 0.04 NG/ML (ref 0–0.03)
TROPONIN I SERPL DL<=0.01 NG/ML-MCNC: 0.04 NG/ML (ref 0–0.03)
WBC # BLD AUTO: 6.47 K/UL (ref 3.9–12.7)

## 2021-11-06 PROCEDURE — 96366 THER/PROPH/DIAG IV INF ADDON: CPT

## 2021-11-06 PROCEDURE — 84484 ASSAY OF TROPONIN QUANT: CPT | Mod: HCNC | Performed by: PHYSICIAN ASSISTANT

## 2021-11-06 PROCEDURE — 96372 THER/PROPH/DIAG INJ SC/IM: CPT | Mod: 59

## 2021-11-06 PROCEDURE — 36415 COLL VENOUS BLD VENIPUNCTURE: CPT | Mod: HCNC | Performed by: PHYSICIAN ASSISTANT

## 2021-11-06 PROCEDURE — 25000003 PHARM REV CODE 250: Mod: HCNC | Performed by: NURSE PRACTITIONER

## 2021-11-06 PROCEDURE — 63600175 PHARM REV CODE 636 W HCPCS: Mod: HCNC | Performed by: NURSE PRACTITIONER

## 2021-11-06 PROCEDURE — 85730 THROMBOPLASTIN TIME PARTIAL: CPT | Mod: HCNC | Performed by: PHYSICIAN ASSISTANT

## 2021-11-06 PROCEDURE — 83735 ASSAY OF MAGNESIUM: CPT | Mod: HCNC | Performed by: NURSE PRACTITIONER

## 2021-11-06 PROCEDURE — 80048 BASIC METABOLIC PNL TOTAL CA: CPT | Mod: HCNC | Performed by: NURSE PRACTITIONER

## 2021-11-06 PROCEDURE — 85730 THROMBOPLASTIN TIME PARTIAL: CPT | Mod: 91,HCNC | Performed by: FAMILY MEDICINE

## 2021-11-06 PROCEDURE — 96365 THER/PROPH/DIAG IV INF INIT: CPT

## 2021-11-06 PROCEDURE — 85025 COMPLETE CBC W/AUTO DIFF WBC: CPT | Mod: HCNC | Performed by: PHYSICIAN ASSISTANT

## 2021-11-06 PROCEDURE — 85610 PROTHROMBIN TIME: CPT | Mod: HCNC | Performed by: PHYSICIAN ASSISTANT

## 2021-11-06 PROCEDURE — 96376 TX/PRO/DX INJ SAME DRUG ADON: CPT

## 2021-11-06 PROCEDURE — 94760 N-INVAS EAR/PLS OXIMETRY 1: CPT | Mod: HCNC

## 2021-11-06 PROCEDURE — 99223 1ST HOSP IP/OBS HIGH 75: CPT | Mod: HCNC,,, | Performed by: INTERNAL MEDICINE

## 2021-11-06 PROCEDURE — 36415 COLL VENOUS BLD VENIPUNCTURE: CPT | Mod: HCNC | Performed by: FAMILY MEDICINE

## 2021-11-06 PROCEDURE — 99223 PR INITIAL HOSPITAL CARE,LEVL III: ICD-10-PCS | Mod: HCNC,,, | Performed by: INTERNAL MEDICINE

## 2021-11-06 PROCEDURE — G0378 HOSPITAL OBSERVATION PER HR: HCPCS | Mod: HCNC

## 2021-11-06 PROCEDURE — 36415 COLL VENOUS BLD VENIPUNCTURE: CPT | Mod: HCNC | Performed by: NURSE PRACTITIONER

## 2021-11-06 PROCEDURE — 63600175 PHARM REV CODE 636 W HCPCS: Mod: HCNC | Performed by: PHYSICIAN ASSISTANT

## 2021-11-06 RX ORDER — HEPARIN SODIUM,PORCINE/D5W 25000/250
0-40 INTRAVENOUS SOLUTION INTRAVENOUS CONTINUOUS
Status: DISCONTINUED | OUTPATIENT
Start: 2021-11-06 | End: 2021-11-07 | Stop reason: DRUGHIGH

## 2021-11-06 RX ADMIN — CLOPIDOGREL 75 MG: 75 TABLET, FILM COATED ORAL at 09:11

## 2021-11-06 RX ADMIN — HEPARIN SODIUM 5000 UNITS: 5000 INJECTION INTRAVENOUS; SUBCUTANEOUS at 06:11

## 2021-11-06 RX ADMIN — METOPROLOL TARTRATE 25 MG: 25 TABLET, FILM COATED ORAL at 09:11

## 2021-11-06 RX ADMIN — PRAVASTATIN SODIUM 40 MG: 20 TABLET ORAL at 09:11

## 2021-11-06 RX ADMIN — BUSPIRONE HYDROCHLORIDE 7.5 MG: 5 TABLET ORAL at 09:11

## 2021-11-06 RX ADMIN — GABAPENTIN 300 MG: 300 CAPSULE ORAL at 09:11

## 2021-11-06 RX ADMIN — FUROSEMIDE 40 MG: 10 INJECTION, SOLUTION INTRAVENOUS at 09:11

## 2021-11-06 RX ADMIN — SODIUM BICARBONATE 650 MG: 650 TABLET ORAL at 09:11

## 2021-11-06 RX ADMIN — MIRTAZAPINE 15 MG: 7.5 TABLET ORAL at 09:11

## 2021-11-06 RX ADMIN — SERTRALINE 150 MG: 50 TABLET, FILM COATED ORAL at 09:11

## 2021-11-06 RX ADMIN — ASPIRIN 81 MG: 81 TABLET, COATED ORAL at 09:11

## 2021-11-06 RX ADMIN — LEVOTHYROXINE SODIUM 75 MCG: 25 TABLET ORAL at 09:11

## 2021-11-06 RX ADMIN — ALLOPURINOL 300 MG: 100 TABLET ORAL at 09:11

## 2021-11-06 RX ADMIN — DULOXETINE 30 MG: 30 CAPSULE, DELAYED RELEASE ORAL at 09:11

## 2021-11-06 RX ADMIN — HEPARIN SODIUM 12 UNITS/KG/HR: 10000 INJECTION, SOLUTION INTRAVENOUS at 09:11

## 2021-11-06 RX ADMIN — NITROGLYCERIN 1 PATCH: 0.2 PATCH TRANSDERMAL at 09:11

## 2021-11-07 LAB
ANION GAP SERPL CALC-SCNC: 11 MMOL/L (ref 8–16)
APTT BLDCRRT: 24.2 SEC (ref 21–32)
APTT BLDCRRT: 34.8 SEC (ref 21–32)
APTT BLDCRRT: 37.9 SEC (ref 21–32)
APTT BLDCRRT: 47.4 SEC (ref 21–32)
APTT BLDCRRT: 47.4 SEC (ref 21–32)
BASOPHILS # BLD AUTO: 0.03 K/UL (ref 0–0.2)
BASOPHILS NFR BLD: 0.4 % (ref 0–1.9)
BUN SERPL-MCNC: 33 MG/DL (ref 8–23)
CALCIUM SERPL-MCNC: 10.3 MG/DL (ref 8.7–10.5)
CHLORIDE SERPL-SCNC: 99 MMOL/L (ref 95–110)
CO2 SERPL-SCNC: 29 MMOL/L (ref 23–29)
CREAT SERPL-MCNC: 1.8 MG/DL (ref 0.5–1.4)
DIFFERENTIAL METHOD: ABNORMAL
EOSINOPHIL # BLD AUTO: 0.2 K/UL (ref 0–0.5)
EOSINOPHIL NFR BLD: 2.4 % (ref 0–8)
ERYTHROCYTE [DISTWIDTH] IN BLOOD BY AUTOMATED COUNT: 15.9 % (ref 11.5–14.5)
EST. GFR  (AFRICAN AMERICAN): 30 ML/MIN/1.73 M^2
EST. GFR  (NON AFRICAN AMERICAN): 26 ML/MIN/1.73 M^2
GLUCOSE SERPL-MCNC: 100 MG/DL (ref 70–110)
HCT VFR BLD AUTO: 38.8 % (ref 37–48.5)
HGB BLD-MCNC: 12 G/DL (ref 12–16)
IMM GRANULOCYTES # BLD AUTO: 0.03 K/UL (ref 0–0.04)
IMM GRANULOCYTES NFR BLD AUTO: 0.4 % (ref 0–0.5)
INR PPP: 0.9 (ref 0.8–1.2)
LYMPHOCYTES # BLD AUTO: 3.2 K/UL (ref 1–4.8)
LYMPHOCYTES NFR BLD: 41.9 % (ref 18–48)
MCH RBC QN AUTO: 32.2 PG (ref 27–31)
MCHC RBC AUTO-ENTMCNC: 30.9 G/DL (ref 32–36)
MCV RBC AUTO: 104 FL (ref 82–98)
MONOCYTES # BLD AUTO: 0.6 K/UL (ref 0.3–1)
MONOCYTES NFR BLD: 8.1 % (ref 4–15)
NEUTROPHILS # BLD AUTO: 3.6 K/UL (ref 1.8–7.7)
NEUTROPHILS NFR BLD: 46.8 % (ref 38–73)
NRBC BLD-RTO: 0 /100 WBC
PLATELET # BLD AUTO: 241 K/UL (ref 150–450)
PMV BLD AUTO: 10.8 FL (ref 9.2–12.9)
POTASSIUM SERPL-SCNC: 3.8 MMOL/L (ref 3.5–5.1)
PROTHROMBIN TIME: 10.4 SEC (ref 9–12.5)
RBC # BLD AUTO: 3.73 M/UL (ref 4–5.4)
SODIUM SERPL-SCNC: 139 MMOL/L (ref 136–145)
TROPONIN I SERPL DL<=0.01 NG/ML-MCNC: 0.03 NG/ML (ref 0–0.03)
WBC # BLD AUTO: 7.63 K/UL (ref 3.9–12.7)

## 2021-11-07 PROCEDURE — 25000003 PHARM REV CODE 250: Mod: HCNC | Performed by: NURSE PRACTITIONER

## 2021-11-07 PROCEDURE — 36415 COLL VENOUS BLD VENIPUNCTURE: CPT | Mod: HCNC | Performed by: FAMILY MEDICINE

## 2021-11-07 PROCEDURE — 85730 THROMBOPLASTIN TIME PARTIAL: CPT | Mod: HCNC | Performed by: FAMILY MEDICINE

## 2021-11-07 PROCEDURE — 63600175 PHARM REV CODE 636 W HCPCS: Mod: HCNC | Performed by: PHYSICIAN ASSISTANT

## 2021-11-07 PROCEDURE — 85025 COMPLETE CBC W/AUTO DIFF WBC: CPT | Mod: HCNC | Performed by: FAMILY MEDICINE

## 2021-11-07 PROCEDURE — 96376 TX/PRO/DX INJ SAME DRUG ADON: CPT

## 2021-11-07 PROCEDURE — 63600175 PHARM REV CODE 636 W HCPCS: Mod: HCNC | Performed by: NURSE PRACTITIONER

## 2021-11-07 PROCEDURE — 21400001 HC TELEMETRY ROOM: Mod: HCNC

## 2021-11-07 PROCEDURE — 99233 PR SUBSEQUENT HOSPITAL CARE,LEVL III: ICD-10-PCS | Mod: HCNC,,, | Performed by: INTERNAL MEDICINE

## 2021-11-07 PROCEDURE — 85610 PROTHROMBIN TIME: CPT | Mod: HCNC | Performed by: FAMILY MEDICINE

## 2021-11-07 PROCEDURE — 85730 THROMBOPLASTIN TIME PARTIAL: CPT | Mod: 91,HCNC | Performed by: FAMILY MEDICINE

## 2021-11-07 PROCEDURE — 80048 BASIC METABOLIC PNL TOTAL CA: CPT | Mod: HCNC | Performed by: NURSE PRACTITIONER

## 2021-11-07 PROCEDURE — 96366 THER/PROPH/DIAG IV INF ADDON: CPT

## 2021-11-07 PROCEDURE — 84484 ASSAY OF TROPONIN QUANT: CPT | Mod: HCNC | Performed by: PHYSICIAN ASSISTANT

## 2021-11-07 PROCEDURE — 99233 SBSQ HOSP IP/OBS HIGH 50: CPT | Mod: HCNC,,, | Performed by: INTERNAL MEDICINE

## 2021-11-07 PROCEDURE — 63600175 PHARM REV CODE 636 W HCPCS: Mod: HCNC | Performed by: FAMILY MEDICINE

## 2021-11-07 RX ORDER — HEPARIN SODIUM,PORCINE/D5W 25000/250
0-40 INTRAVENOUS SOLUTION INTRAVENOUS CONTINUOUS
Status: DISCONTINUED | OUTPATIENT
Start: 2021-11-07 | End: 2021-11-07

## 2021-11-07 RX ORDER — HEPARIN SODIUM,PORCINE/D5W 25000/250
0-40 INTRAVENOUS SOLUTION INTRAVENOUS CONTINUOUS
Status: DISCONTINUED | OUTPATIENT
Start: 2021-11-07 | End: 2021-11-09

## 2021-11-07 RX ADMIN — SODIUM BICARBONATE 650 MG: 650 TABLET ORAL at 09:11

## 2021-11-07 RX ADMIN — METOPROLOL TARTRATE 25 MG: 25 TABLET, FILM COATED ORAL at 09:11

## 2021-11-07 RX ADMIN — ASPIRIN 81 MG: 81 TABLET, COATED ORAL at 08:11

## 2021-11-07 RX ADMIN — DULOXETINE 30 MG: 30 CAPSULE, DELAYED RELEASE ORAL at 09:11

## 2021-11-07 RX ADMIN — FUROSEMIDE 40 MG: 10 INJECTION, SOLUTION INTRAVENOUS at 10:11

## 2021-11-07 RX ADMIN — METOPROLOL TARTRATE 25 MG: 25 TABLET, FILM COATED ORAL at 08:11

## 2021-11-07 RX ADMIN — LEVOTHYROXINE SODIUM 75 MCG: 25 TABLET ORAL at 09:11

## 2021-11-07 RX ADMIN — ALLOPURINOL 300 MG: 100 TABLET ORAL at 09:11

## 2021-11-07 RX ADMIN — NITROGLYCERIN 1 PATCH: 0.2 PATCH TRANSDERMAL at 09:11

## 2021-11-07 RX ADMIN — MIRTAZAPINE 15 MG: 7.5 TABLET ORAL at 09:11

## 2021-11-07 RX ADMIN — SERTRALINE 150 MG: 50 TABLET, FILM COATED ORAL at 09:11

## 2021-11-07 RX ADMIN — BUSPIRONE HYDROCHLORIDE 7.5 MG: 5 TABLET ORAL at 08:11

## 2021-11-07 RX ADMIN — BUSPIRONE HYDROCHLORIDE 7.5 MG: 5 TABLET ORAL at 09:11

## 2021-11-07 RX ADMIN — GABAPENTIN 300 MG: 300 CAPSULE ORAL at 08:11

## 2021-11-07 RX ADMIN — HEPARIN SODIUM 14 UNITS/KG/HR: 10000 INJECTION, SOLUTION INTRAVENOUS at 02:11

## 2021-11-07 RX ADMIN — PRAVASTATIN SODIUM 40 MG: 20 TABLET ORAL at 09:11

## 2021-11-07 RX ADMIN — FUROSEMIDE 40 MG: 10 INJECTION, SOLUTION INTRAVENOUS at 09:11

## 2021-11-07 RX ADMIN — CLOPIDOGREL 75 MG: 75 TABLET, FILM COATED ORAL at 09:11

## 2021-11-08 PROBLEM — E43 SEVERE MALNUTRITION: Status: ACTIVE | Noted: 2021-11-08

## 2021-11-08 LAB
ANION GAP SERPL CALC-SCNC: 12 MMOL/L (ref 8–16)
APTT BLDCRRT: 35.6 SEC (ref 21–32)
APTT BLDCRRT: 51 SEC (ref 21–32)
APTT BLDCRRT: 53 SEC (ref 21–32)
BASOPHILS # BLD AUTO: 0.04 K/UL (ref 0–0.2)
BASOPHILS NFR BLD: 0.5 % (ref 0–1.9)
BUN SERPL-MCNC: 42 MG/DL (ref 8–23)
CALCIUM SERPL-MCNC: 9.3 MG/DL (ref 8.7–10.5)
CHLORIDE SERPL-SCNC: 101 MMOL/L (ref 95–110)
CO2 SERPL-SCNC: 28 MMOL/L (ref 23–29)
CREAT SERPL-MCNC: 2.2 MG/DL (ref 0.5–1.4)
DIFFERENTIAL METHOD: ABNORMAL
EOSINOPHIL # BLD AUTO: 0.2 K/UL (ref 0–0.5)
EOSINOPHIL NFR BLD: 2.9 % (ref 0–8)
ERYTHROCYTE [DISTWIDTH] IN BLOOD BY AUTOMATED COUNT: 15.7 % (ref 11.5–14.5)
EST. GFR  (AFRICAN AMERICAN): 24 ML/MIN/1.73 M^2
EST. GFR  (NON AFRICAN AMERICAN): 21 ML/MIN/1.73 M^2
GLUCOSE SERPL-MCNC: 100 MG/DL (ref 70–110)
HCT VFR BLD AUTO: 35.3 % (ref 37–48.5)
HGB BLD-MCNC: 11.3 G/DL (ref 12–16)
IMM GRANULOCYTES # BLD AUTO: 0.05 K/UL (ref 0–0.04)
IMM GRANULOCYTES NFR BLD AUTO: 0.6 % (ref 0–0.5)
LYMPHOCYTES # BLD AUTO: 3.8 K/UL (ref 1–4.8)
LYMPHOCYTES NFR BLD: 46.6 % (ref 18–48)
MCH RBC QN AUTO: 32.3 PG (ref 27–31)
MCHC RBC AUTO-ENTMCNC: 32 G/DL (ref 32–36)
MCV RBC AUTO: 101 FL (ref 82–98)
MONOCYTES # BLD AUTO: 0.7 K/UL (ref 0.3–1)
MONOCYTES NFR BLD: 8.8 % (ref 4–15)
NEUTROPHILS # BLD AUTO: 3.3 K/UL (ref 1.8–7.7)
NEUTROPHILS NFR BLD: 40.6 % (ref 38–73)
NRBC BLD-RTO: 0 /100 WBC
PLATELET # BLD AUTO: 228 K/UL (ref 150–450)
PMV BLD AUTO: 10.7 FL (ref 9.2–12.9)
POTASSIUM SERPL-SCNC: 3.9 MMOL/L (ref 3.5–5.1)
RBC # BLD AUTO: 3.5 M/UL (ref 4–5.4)
SODIUM SERPL-SCNC: 141 MMOL/L (ref 136–145)
WBC # BLD AUTO: 8.14 K/UL (ref 3.9–12.7)

## 2021-11-08 PROCEDURE — 99233 PR SUBSEQUENT HOSPITAL CARE,LEVL III: ICD-10-PCS | Mod: HCNC,,, | Performed by: NURSE PRACTITIONER

## 2021-11-08 PROCEDURE — 36415 COLL VENOUS BLD VENIPUNCTURE: CPT | Mod: HCNC | Performed by: FAMILY MEDICINE

## 2021-11-08 PROCEDURE — 94761 N-INVAS EAR/PLS OXIMETRY MLT: CPT | Mod: HCNC

## 2021-11-08 PROCEDURE — 80048 BASIC METABOLIC PNL TOTAL CA: CPT | Mod: HCNC | Performed by: NURSE PRACTITIONER

## 2021-11-08 PROCEDURE — 99233 SBSQ HOSP IP/OBS HIGH 50: CPT | Mod: HCNC,,, | Performed by: NURSE PRACTITIONER

## 2021-11-08 PROCEDURE — 25000003 PHARM REV CODE 250: Mod: HCNC | Performed by: NURSE PRACTITIONER

## 2021-11-08 PROCEDURE — 63600175 PHARM REV CODE 636 W HCPCS: Mod: HCNC | Performed by: NURSE PRACTITIONER

## 2021-11-08 PROCEDURE — 85025 COMPLETE CBC W/AUTO DIFF WBC: CPT | Mod: HCNC | Performed by: FAMILY MEDICINE

## 2021-11-08 PROCEDURE — 21400001 HC TELEMETRY ROOM: Mod: HCNC

## 2021-11-08 PROCEDURE — 63600175 PHARM REV CODE 636 W HCPCS: Mod: HCNC | Performed by: FAMILY MEDICINE

## 2021-11-08 PROCEDURE — 85730 THROMBOPLASTIN TIME PARTIAL: CPT | Mod: 91,HCNC | Performed by: FAMILY MEDICINE

## 2021-11-08 RX ORDER — REGADENOSON 0.08 MG/ML
0.4 INJECTION, SOLUTION INTRAVENOUS ONCE
Status: COMPLETED | OUTPATIENT
Start: 2021-11-08 | End: 2021-11-08

## 2021-11-08 RX ORDER — FUROSEMIDE 10 MG/ML
40 INJECTION INTRAMUSCULAR; INTRAVENOUS DAILY
Status: DISCONTINUED | OUTPATIENT
Start: 2021-11-09 | End: 2021-11-09

## 2021-11-08 RX ADMIN — SERTRALINE 150 MG: 50 TABLET, FILM COATED ORAL at 10:11

## 2021-11-08 RX ADMIN — NITROGLYCERIN 1 PATCH: 0.2 PATCH TRANSDERMAL at 10:11

## 2021-11-08 RX ADMIN — ASPIRIN 81 MG: 81 TABLET, COATED ORAL at 08:11

## 2021-11-08 RX ADMIN — FUROSEMIDE 40 MG: 10 INJECTION, SOLUTION INTRAVENOUS at 10:11

## 2021-11-08 RX ADMIN — LEVOTHYROXINE SODIUM 75 MCG: 25 TABLET ORAL at 10:11

## 2021-11-08 RX ADMIN — BUSPIRONE HYDROCHLORIDE 7.5 MG: 5 TABLET ORAL at 10:11

## 2021-11-08 RX ADMIN — SODIUM BICARBONATE 650 MG: 650 TABLET ORAL at 08:11

## 2021-11-08 RX ADMIN — CLOPIDOGREL 75 MG: 75 TABLET, FILM COATED ORAL at 10:11

## 2021-11-08 RX ADMIN — DULOXETINE 30 MG: 30 CAPSULE, DELAYED RELEASE ORAL at 10:11

## 2021-11-08 RX ADMIN — METOPROLOL TARTRATE 25 MG: 25 TABLET, FILM COATED ORAL at 10:11

## 2021-11-08 RX ADMIN — ALLOPURINOL 300 MG: 100 TABLET ORAL at 10:11

## 2021-11-08 RX ADMIN — REGADENOSON 0.4 MG: 0.08 INJECTION, SOLUTION INTRAVENOUS at 09:11

## 2021-11-08 RX ADMIN — PRAVASTATIN SODIUM 40 MG: 20 TABLET ORAL at 10:11

## 2021-11-08 RX ADMIN — GABAPENTIN 300 MG: 300 CAPSULE ORAL at 08:11

## 2021-11-08 RX ADMIN — MIRTAZAPINE 15 MG: 7.5 TABLET ORAL at 08:11

## 2021-11-08 RX ADMIN — CALCITRIOL CAPSULES 0.25 MCG 0.25 MCG: 0.25 CAPSULE ORAL at 10:11

## 2021-11-08 RX ADMIN — BUSPIRONE HYDROCHLORIDE 7.5 MG: 5 TABLET ORAL at 08:11

## 2021-11-08 RX ADMIN — HEPARIN SODIUM 18 UNITS/KG/HR: 10000 INJECTION, SOLUTION INTRAVENOUS at 03:11

## 2021-11-08 RX ADMIN — SODIUM BICARBONATE 650 MG: 650 TABLET ORAL at 10:11

## 2021-11-09 ENCOUNTER — DOCUMENTATION ONLY (OUTPATIENT)
Dept: TRANSPLANT | Facility: CLINIC | Age: 81
End: 2021-11-09
Payer: MEDICARE

## 2021-11-09 VITALS
TEMPERATURE: 97 F | OXYGEN SATURATION: 99 % | DIASTOLIC BLOOD PRESSURE: 70 MMHG | SYSTOLIC BLOOD PRESSURE: 126 MMHG | RESPIRATION RATE: 18 BRPM | WEIGHT: 134.5 LBS | HEIGHT: 64 IN | BODY MASS INDEX: 22.96 KG/M2 | HEART RATE: 76 BPM

## 2021-11-09 PROBLEM — I50.43 ACUTE ON CHRONIC COMBINED SYSTOLIC AND DIASTOLIC CONGESTIVE HEART FAILURE: Status: RESOLVED | Noted: 2021-11-05 | Resolved: 2021-11-09

## 2021-11-09 LAB
ANION GAP SERPL CALC-SCNC: 13 MMOL/L (ref 8–16)
APTT BLDCRRT: 62.9 SEC (ref 21–32)
APTT BLDCRRT: 72.3 SEC (ref 21–32)
BASOPHILS # BLD AUTO: 0.05 K/UL (ref 0–0.2)
BASOPHILS NFR BLD: 0.5 % (ref 0–1.9)
BUN SERPL-MCNC: 48 MG/DL (ref 8–23)
CALCIUM SERPL-MCNC: 8.8 MG/DL (ref 8.7–10.5)
CHLORIDE SERPL-SCNC: 100 MMOL/L (ref 95–110)
CO2 SERPL-SCNC: 26 MMOL/L (ref 23–29)
CREAT SERPL-MCNC: 2.2 MG/DL (ref 0.5–1.4)
CV STRESS BASE HR: 68 BPM
DIASTOLIC BLOOD PRESSURE: 39 MMHG
DIFFERENTIAL METHOD: ABNORMAL
EOSINOPHIL # BLD AUTO: 0.3 K/UL (ref 0–0.5)
EOSINOPHIL NFR BLD: 2.7 % (ref 0–8)
ERYTHROCYTE [DISTWIDTH] IN BLOOD BY AUTOMATED COUNT: 15.8 % (ref 11.5–14.5)
EST. GFR  (AFRICAN AMERICAN): 24 ML/MIN/1.73 M^2
EST. GFR  (NON AFRICAN AMERICAN): 21 ML/MIN/1.73 M^2
GLUCOSE SERPL-MCNC: 92 MG/DL (ref 70–110)
HCT VFR BLD AUTO: 35 % (ref 37–48.5)
HGB BLD-MCNC: 10.8 G/DL (ref 12–16)
IMM GRANULOCYTES # BLD AUTO: 0.05 K/UL (ref 0–0.04)
IMM GRANULOCYTES NFR BLD AUTO: 0.5 % (ref 0–0.5)
LYMPHOCYTES # BLD AUTO: 4.8 K/UL (ref 1–4.8)
LYMPHOCYTES NFR BLD: 51.1 % (ref 18–48)
MCH RBC QN AUTO: 32 PG (ref 27–31)
MCHC RBC AUTO-ENTMCNC: 30.9 G/DL (ref 32–36)
MCV RBC AUTO: 104 FL (ref 82–98)
MONOCYTES # BLD AUTO: 0.7 K/UL (ref 0.3–1)
MONOCYTES NFR BLD: 7.4 % (ref 4–15)
NEUTROPHILS # BLD AUTO: 3.6 K/UL (ref 1.8–7.7)
NEUTROPHILS NFR BLD: 37.8 % (ref 38–73)
NRBC BLD-RTO: 0 /100 WBC
NUC REST EJECTION FRACTION: 56
NUC STRESS EJECTION FRACTION: 58 %
OHS CV CPX 85 PERCENT MAX PREDICTED HEART RATE MALE: 115
OHS CV CPX MAX PREDICTED HEART RATE: 136
OHS CV CPX PATIENT IS FEMALE: 1
OHS CV CPX PATIENT IS MALE: 0
OHS CV CPX PEAK DIASTOLIC BLOOD PRESSURE: 39 MMHG
OHS CV CPX PEAK HEAR RATE: 111 BPM
OHS CV CPX PEAK RATE PRESSURE PRODUCT: NORMAL
OHS CV CPX PEAK SYSTOLIC BLOOD PRESSURE: 159 MMHG
OHS CV CPX PERCENT MAX PREDICTED HEART RATE ACHIEVED: 82
OHS CV CPX RATE PRESSURE PRODUCT PRESENTING: NORMAL
PLATELET # BLD AUTO: 228 K/UL (ref 150–450)
PMV BLD AUTO: 10.6 FL (ref 9.2–12.9)
POTASSIUM SERPL-SCNC: 4.2 MMOL/L (ref 3.5–5.1)
RBC # BLD AUTO: 3.38 M/UL (ref 4–5.4)
SODIUM SERPL-SCNC: 139 MMOL/L (ref 136–145)
SYSTOLIC BLOOD PRESSURE: 159 MMHG
WBC # BLD AUTO: 9.41 K/UL (ref 3.9–12.7)

## 2021-11-09 PROCEDURE — 85730 THROMBOPLASTIN TIME PARTIAL: CPT | Mod: HCNC | Performed by: FAMILY MEDICINE

## 2021-11-09 PROCEDURE — 25000003 PHARM REV CODE 250: Mod: HCNC | Performed by: INTERNAL MEDICINE

## 2021-11-09 PROCEDURE — 36415 COLL VENOUS BLD VENIPUNCTURE: CPT | Mod: HCNC | Performed by: FAMILY MEDICINE

## 2021-11-09 PROCEDURE — 99232 PR SUBSEQUENT HOSPITAL CARE,LEVL II: ICD-10-PCS | Mod: HCNC,,, | Performed by: NURSE PRACTITIONER

## 2021-11-09 PROCEDURE — 25000003 PHARM REV CODE 250: Mod: HCNC | Performed by: FAMILY MEDICINE

## 2021-11-09 PROCEDURE — 85025 COMPLETE CBC W/AUTO DIFF WBC: CPT | Mod: HCNC | Performed by: FAMILY MEDICINE

## 2021-11-09 PROCEDURE — 99232 SBSQ HOSP IP/OBS MODERATE 35: CPT | Mod: HCNC,,, | Performed by: NURSE PRACTITIONER

## 2021-11-09 PROCEDURE — 80048 BASIC METABOLIC PNL TOTAL CA: CPT | Mod: HCNC | Performed by: NURSE PRACTITIONER

## 2021-11-09 PROCEDURE — 25000003 PHARM REV CODE 250: Mod: HCNC | Performed by: NURSE PRACTITIONER

## 2021-11-09 RX ORDER — FUROSEMIDE 40 MG/1
40 TABLET ORAL DAILY
Status: DISCONTINUED | OUTPATIENT
Start: 2021-11-09 | End: 2021-11-09 | Stop reason: HOSPADM

## 2021-11-09 RX ORDER — FUROSEMIDE 40 MG/1
40 TABLET ORAL DAILY
Qty: 30 TABLET | Refills: 0 | Status: SHIPPED | OUTPATIENT
Start: 2021-11-10 | End: 2021-12-06 | Stop reason: SDUPTHER

## 2021-11-09 RX ADMIN — SERTRALINE 150 MG: 50 TABLET, FILM COATED ORAL at 09:11

## 2021-11-09 RX ADMIN — APIXABAN 2.5 MG: 2.5 TABLET, FILM COATED ORAL at 09:11

## 2021-11-09 RX ADMIN — PRAVASTATIN SODIUM 40 MG: 20 TABLET ORAL at 09:11

## 2021-11-09 RX ADMIN — METOPROLOL TARTRATE 25 MG: 25 TABLET, FILM COATED ORAL at 09:11

## 2021-11-09 RX ADMIN — SODIUM BICARBONATE 650 MG: 650 TABLET ORAL at 09:11

## 2021-11-09 RX ADMIN — CLOPIDOGREL 75 MG: 75 TABLET, FILM COATED ORAL at 09:11

## 2021-11-09 RX ADMIN — DULOXETINE 30 MG: 30 CAPSULE, DELAYED RELEASE ORAL at 09:11

## 2021-11-09 RX ADMIN — NITROGLYCERIN 1 PATCH: 0.2 PATCH TRANSDERMAL at 09:11

## 2021-11-09 RX ADMIN — LEVOTHYROXINE SODIUM 75 MCG: 25 TABLET ORAL at 09:11

## 2021-11-09 RX ADMIN — ALLOPURINOL 300 MG: 100 TABLET ORAL at 09:11

## 2021-11-09 RX ADMIN — FUROSEMIDE 40 MG: 40 TABLET ORAL at 09:11

## 2021-11-09 RX ADMIN — BUSPIRONE HYDROCHLORIDE 7.5 MG: 5 TABLET ORAL at 09:11

## 2021-11-10 ENCOUNTER — PATIENT OUTREACH (OUTPATIENT)
Dept: ADMINISTRATIVE | Facility: CLINIC | Age: 81
End: 2021-11-10
Payer: MEDICARE

## 2021-11-11 ENCOUNTER — HOSPITAL ENCOUNTER (OUTPATIENT)
Facility: HOSPITAL | Age: 81
Discharge: HOME | End: 2021-11-11
Attending: INTERNAL MEDICINE | Admitting: INTERNAL MEDICINE
Payer: MEDICARE

## 2021-11-11 ENCOUNTER — TELEPHONE (OUTPATIENT)
Dept: TRANSPLANT | Facility: CLINIC | Age: 81
End: 2021-11-11
Payer: MEDICARE

## 2021-11-11 ENCOUNTER — ANESTHESIA (OUTPATIENT)
Dept: CARDIOLOGY | Facility: HOSPITAL | Age: 81
End: 2021-11-11
Payer: MEDICARE

## 2021-11-11 ENCOUNTER — ANESTHESIA EVENT (OUTPATIENT)
Dept: CARDIOLOGY | Facility: HOSPITAL | Age: 81
End: 2021-11-11
Payer: MEDICARE

## 2021-11-11 VITALS
OXYGEN SATURATION: 99 % | HEART RATE: 66 BPM | BODY MASS INDEX: 22.53 KG/M2 | WEIGHT: 132 LBS | RESPIRATION RATE: 15 BRPM | HEIGHT: 64 IN | DIASTOLIC BLOOD PRESSURE: 65 MMHG | SYSTOLIC BLOOD PRESSURE: 111 MMHG | TEMPERATURE: 98 F

## 2021-11-11 DIAGNOSIS — I48.91 A-FIB: ICD-10-CM

## 2021-11-11 DIAGNOSIS — I48.19 ATRIAL FIBRILLATION, PERSISTENT: ICD-10-CM

## 2021-11-11 LAB
BSA FOR ECHO PROCEDURE: 1.64 M2
EJECTION FRACTION: 50 %

## 2021-11-11 PROCEDURE — 25000003 PHARM REV CODE 250: Mod: HCNC | Performed by: INTERNAL MEDICINE

## 2021-11-11 PROCEDURE — 63600175 PHARM REV CODE 636 W HCPCS: Mod: HCNC | Performed by: NURSE ANESTHETIST, CERTIFIED REGISTERED

## 2021-11-11 PROCEDURE — 37000009 HC ANESTHESIA EA ADD 15 MINS: Mod: HCNC | Performed by: INTERNAL MEDICINE

## 2021-11-11 PROCEDURE — 25000003 PHARM REV CODE 250: Mod: HCNC | Performed by: NURSE ANESTHETIST, CERTIFIED REGISTERED

## 2021-11-11 PROCEDURE — 37000008 HC ANESTHESIA 1ST 15 MINUTES: Mod: HCNC | Performed by: INTERNAL MEDICINE

## 2021-11-11 PROCEDURE — 93005 ELECTROCARDIOGRAM TRACING: CPT | Mod: HCNC

## 2021-11-11 PROCEDURE — 93010 EKG 12-LEAD: ICD-10-PCS | Mod: HCNC,,, | Performed by: INTERNAL MEDICINE

## 2021-11-11 PROCEDURE — 93010 ELECTROCARDIOGRAM REPORT: CPT | Mod: HCNC,,, | Performed by: INTERNAL MEDICINE

## 2021-11-11 RX ORDER — PROPOFOL 10 MG/ML
VIAL (ML) INTRAVENOUS
Status: DISCONTINUED | OUTPATIENT
Start: 2021-11-11 | End: 2021-11-11

## 2021-11-11 RX ORDER — SODIUM CHLORIDE 9 MG/ML
INJECTION, SOLUTION INTRAVENOUS CONTINUOUS
Status: DISCONTINUED | OUTPATIENT
Start: 2021-11-11 | End: 2021-11-11 | Stop reason: HOSPADM

## 2021-11-11 RX ADMIN — SODIUM CHLORIDE: 9 INJECTION, SOLUTION INTRAVENOUS at 08:11

## 2021-11-11 RX ADMIN — PROPOFOL 40 MG: 10 INJECTION, EMULSION INTRAVENOUS at 08:11

## 2021-11-11 RX ADMIN — PROPOFOL 20 MG: 10 INJECTION, EMULSION INTRAVENOUS at 08:11

## 2021-11-15 ENCOUNTER — LAB VISIT (OUTPATIENT)
Dept: LAB | Facility: HOSPITAL | Age: 81
End: 2021-11-15
Attending: INTERNAL MEDICINE
Payer: MEDICARE

## 2021-11-15 DIAGNOSIS — N18.31 CHRONIC KIDNEY DISEASE (CKD) STAGE G3A/A1, MODERATELY DECREASED GLOMERULAR FILTRATION RATE (GFR) BETWEEN 45-59 ML/MIN/1.73 SQUARE METER AND ALBUMINURIA CREATININE RATIO LESS THAN 30 MG/G: ICD-10-CM

## 2021-11-15 DIAGNOSIS — E87.5 HYPERKALEMIA: ICD-10-CM

## 2021-11-15 DIAGNOSIS — E21.3 HPTH (HYPERPARATHYROIDISM): ICD-10-CM

## 2021-11-15 LAB
25(OH)D3+25(OH)D2 SERPL-MCNC: 30 NG/ML (ref 30–96)
ALBUMIN SERPL BCP-MCNC: 3.7 G/DL (ref 3.5–5.2)
ALP SERPL-CCNC: 63 U/L (ref 55–135)
ALT SERPL W/O P-5'-P-CCNC: 37 U/L (ref 10–44)
ANION GAP SERPL CALC-SCNC: 8 MMOL/L (ref 8–16)
AST SERPL-CCNC: 51 U/L (ref 10–40)
BILIRUB SERPL-MCNC: 0.4 MG/DL (ref 0.1–1)
BUN SERPL-MCNC: 38 MG/DL (ref 8–23)
CALCIUM SERPL-MCNC: 9.3 MG/DL (ref 8.7–10.5)
CHLORIDE SERPL-SCNC: 107 MMOL/L (ref 95–110)
CO2 SERPL-SCNC: 25 MMOL/L (ref 23–29)
CREAT SERPL-MCNC: 1.6 MG/DL (ref 0.5–1.4)
EST. GFR  (AFRICAN AMERICAN): 34.8 ML/MIN/1.73 M^2
EST. GFR  (NON AFRICAN AMERICAN): 30.2 ML/MIN/1.73 M^2
GLUCOSE SERPL-MCNC: 94 MG/DL (ref 70–110)
POTASSIUM SERPL-SCNC: 5.6 MMOL/L (ref 3.5–5.1)
PROT SERPL-MCNC: 7.4 G/DL (ref 6–8.4)
SODIUM SERPL-SCNC: 140 MMOL/L (ref 136–145)

## 2021-11-15 PROCEDURE — 36415 COLL VENOUS BLD VENIPUNCTURE: CPT | Mod: HCNC | Performed by: INTERNAL MEDICINE

## 2021-11-15 PROCEDURE — 82306 VITAMIN D 25 HYDROXY: CPT | Mod: HCNC | Performed by: INTERNAL MEDICINE

## 2021-11-15 PROCEDURE — 80053 COMPREHEN METABOLIC PANEL: CPT | Mod: HCNC | Performed by: INTERNAL MEDICINE

## 2021-11-22 ENCOUNTER — OFFICE VISIT (OUTPATIENT)
Dept: NEPHROLOGY | Facility: CLINIC | Age: 81
End: 2021-11-22
Payer: MEDICARE

## 2021-11-22 ENCOUNTER — OFFICE VISIT (OUTPATIENT)
Dept: HEMATOLOGY/ONCOLOGY | Facility: CLINIC | Age: 81
End: 2021-11-22
Payer: MEDICARE

## 2021-11-22 ENCOUNTER — SOCIAL WORK (OUTPATIENT)
Dept: HEMATOLOGY/ONCOLOGY | Facility: CLINIC | Age: 81
End: 2021-11-22

## 2021-11-22 VITALS
WEIGHT: 138.44 LBS | SYSTOLIC BLOOD PRESSURE: 111 MMHG | BODY MASS INDEX: 23.64 KG/M2 | HEART RATE: 94 BPM | HEIGHT: 64 IN | OXYGEN SATURATION: 99 % | TEMPERATURE: 97 F | DIASTOLIC BLOOD PRESSURE: 75 MMHG

## 2021-11-22 VITALS
SYSTOLIC BLOOD PRESSURE: 120 MMHG | DIASTOLIC BLOOD PRESSURE: 75 MMHG | WEIGHT: 141.13 LBS | BODY MASS INDEX: 24.1 KG/M2 | HEART RATE: 96 BPM | HEIGHT: 64 IN

## 2021-11-22 DIAGNOSIS — I12.9 PARENCHYMAL RENAL HYPERTENSION, STAGE 1 THROUGH STAGE 4 OR UNSPECIFIED CHRONIC KIDNEY DISEASE: ICD-10-CM

## 2021-11-22 DIAGNOSIS — C83.30 DIFFUSE LARGE B-CELL LYMPHOMA, UNSPECIFIED BODY REGION: Primary | ICD-10-CM

## 2021-11-22 DIAGNOSIS — E87.20 ACIDOSIS: ICD-10-CM

## 2021-11-22 DIAGNOSIS — F43.22 ADJUSTMENT DISORDER WITH ANXIETY: ICD-10-CM

## 2021-11-22 DIAGNOSIS — E87.5 HYPERKALEMIA: ICD-10-CM

## 2021-11-22 DIAGNOSIS — N18.32 STAGE 3B CHRONIC KIDNEY DISEASE: Primary | ICD-10-CM

## 2021-11-22 DIAGNOSIS — F51.01 PRIMARY INSOMNIA: ICD-10-CM

## 2021-11-22 PROCEDURE — 99499 UNLISTED E&M SERVICE: CPT | Mod: S$GLB,,, | Performed by: INTERNAL MEDICINE

## 2021-11-22 PROCEDURE — 1157F ADVNC CARE PLAN IN RCRD: CPT | Mod: HCNC,CPTII,S$GLB, | Performed by: INTERNAL MEDICINE

## 2021-11-22 PROCEDURE — 99499 RISK ADDL DX/OHS AUDIT: ICD-10-PCS | Mod: S$GLB,,, | Performed by: INTERNAL MEDICINE

## 2021-11-22 PROCEDURE — 99214 PR OFFICE/OUTPT VISIT, EST, LEVL IV, 30-39 MIN: ICD-10-PCS | Mod: HCNC,S$GLB,, | Performed by: INTERNAL MEDICINE

## 2021-11-22 PROCEDURE — 99999 PR PBB SHADOW E&M-EST. PATIENT-LVL IV: CPT | Mod: PBBFAC,HCNC,, | Performed by: INTERNAL MEDICINE

## 2021-11-22 PROCEDURE — 1157F PR ADVANCE CARE PLAN OR EQUIV PRESENT IN MEDICAL RECORD: ICD-10-PCS | Mod: HCNC,CPTII,S$GLB, | Performed by: INTERNAL MEDICINE

## 2021-11-22 PROCEDURE — 99214 OFFICE O/P EST MOD 30 MIN: CPT | Mod: HCNC,S$GLB,, | Performed by: INTERNAL MEDICINE

## 2021-11-22 PROCEDURE — 99999 PR PBB SHADOW E&M-EST. PATIENT-LVL V: CPT | Mod: PBBFAC,HCNC,, | Performed by: INTERNAL MEDICINE

## 2021-11-22 PROCEDURE — 99999 PR PBB SHADOW E&M-EST. PATIENT-LVL V: ICD-10-PCS | Mod: PBBFAC,HCNC,, | Performed by: INTERNAL MEDICINE

## 2021-11-22 PROCEDURE — 99999 PR PBB SHADOW E&M-EST. PATIENT-LVL IV: ICD-10-PCS | Mod: PBBFAC,HCNC,, | Performed by: INTERNAL MEDICINE

## 2021-11-22 RX ORDER — MIRTAZAPINE 15 MG/1
TABLET, ORALLY DISINTEGRATING ORAL
Qty: 30 TABLET | Refills: 3 | Status: SHIPPED | OUTPATIENT
Start: 2021-11-22 | End: 2022-01-28

## 2021-11-24 ENCOUNTER — OFFICE VISIT (OUTPATIENT)
Dept: CARDIOLOGY | Facility: CLINIC | Age: 81
End: 2021-11-24
Payer: MEDICARE

## 2021-11-24 ENCOUNTER — PATIENT OUTREACH (OUTPATIENT)
Dept: ADMINISTRATIVE | Facility: HOSPITAL | Age: 81
End: 2021-11-24
Payer: MEDICARE

## 2021-11-24 VITALS
BODY MASS INDEX: 23.98 KG/M2 | DIASTOLIC BLOOD PRESSURE: 78 MMHG | HEART RATE: 98 BPM | SYSTOLIC BLOOD PRESSURE: 130 MMHG | HEIGHT: 64 IN | WEIGHT: 140.44 LBS | OXYGEN SATURATION: 100 %

## 2021-11-24 DIAGNOSIS — T45.1X5A CHEMOTHERAPY-INDUCED NEUROPATHY: Chronic | ICD-10-CM

## 2021-11-24 DIAGNOSIS — G62.0 CHEMOTHERAPY-INDUCED NEUROPATHY: Chronic | ICD-10-CM

## 2021-11-24 DIAGNOSIS — N18.32 STAGE 3B CHRONIC KIDNEY DISEASE: Chronic | ICD-10-CM

## 2021-11-24 DIAGNOSIS — I10 ESSENTIAL HYPERTENSION: Chronic | ICD-10-CM

## 2021-11-24 DIAGNOSIS — Z95.0 CARDIAC PACEMAKER IN SITU: ICD-10-CM

## 2021-11-24 DIAGNOSIS — E78.2 MIXED HYPERLIPIDEMIA: Chronic | ICD-10-CM

## 2021-11-24 DIAGNOSIS — I48.91 ATRIAL FIBRILLATION, UNSPECIFIED TYPE: ICD-10-CM

## 2021-11-24 DIAGNOSIS — I50.43 ACUTE ON CHRONIC COMBINED SYSTOLIC AND DIASTOLIC CONGESTIVE HEART FAILURE: Primary | ICD-10-CM

## 2021-11-24 DIAGNOSIS — Z95.0 PACEMAKER: Chronic | ICD-10-CM

## 2021-11-24 DIAGNOSIS — Z91.89 AT HIGH RISK FOR HYPOCALCEMIA: ICD-10-CM

## 2021-11-24 DIAGNOSIS — I25.10 CORONARY ARTERY DISEASE INVOLVING NATIVE CORONARY ARTERY OF NATIVE HEART WITHOUT ANGINA PECTORIS: Chronic | ICD-10-CM

## 2021-11-24 DIAGNOSIS — Z98.84 S/P GASTRIC BYPASS: Chronic | ICD-10-CM

## 2021-11-24 DIAGNOSIS — I70.0 CALCIFICATION OF AORTA: Chronic | ICD-10-CM

## 2021-11-24 DIAGNOSIS — I25.5 ISCHEMIC CARDIOMYOPATHY: Chronic | ICD-10-CM

## 2021-11-24 DIAGNOSIS — C85.83 LARGE CELL LYMPHOMA OF INTRA-ABDOMINAL LYMPH NODES: Chronic | ICD-10-CM

## 2021-11-24 DIAGNOSIS — I50.32 CHRONIC DIASTOLIC HEART FAILURE: ICD-10-CM

## 2021-11-24 DIAGNOSIS — C83.30 DIFFUSE LARGE B-CELL LYMPHOMA, UNSPECIFIED BODY REGION: ICD-10-CM

## 2021-11-24 PROCEDURE — 99999 PR PBB SHADOW E&M-EST. PATIENT-LVL V: CPT | Mod: PBBFAC,HCNC,, | Performed by: INTERNAL MEDICINE

## 2021-11-24 PROCEDURE — 99999 PR PBB SHADOW E&M-EST. PATIENT-LVL V: ICD-10-PCS | Mod: PBBFAC,HCNC,, | Performed by: INTERNAL MEDICINE

## 2021-11-24 PROCEDURE — 1157F PR ADVANCE CARE PLAN OR EQUIV PRESENT IN MEDICAL RECORD: ICD-10-PCS | Mod: HCNC,CPTII,S$GLB, | Performed by: INTERNAL MEDICINE

## 2021-11-24 PROCEDURE — 99214 OFFICE O/P EST MOD 30 MIN: CPT | Mod: HCNC,S$GLB,, | Performed by: INTERNAL MEDICINE

## 2021-11-24 PROCEDURE — 99214 PR OFFICE/OUTPT VISIT, EST, LEVL IV, 30-39 MIN: ICD-10-PCS | Mod: HCNC,S$GLB,, | Performed by: INTERNAL MEDICINE

## 2021-11-24 PROCEDURE — 1157F ADVNC CARE PLAN IN RCRD: CPT | Mod: HCNC,CPTII,S$GLB, | Performed by: INTERNAL MEDICINE

## 2021-11-29 ENCOUNTER — OFFICE VISIT (OUTPATIENT)
Dept: INTERNAL MEDICINE | Facility: CLINIC | Age: 81
End: 2021-11-29
Payer: MEDICARE

## 2021-11-29 VITALS
OXYGEN SATURATION: 98 % | SYSTOLIC BLOOD PRESSURE: 122 MMHG | WEIGHT: 135.81 LBS | TEMPERATURE: 98 F | HEART RATE: 77 BPM | DIASTOLIC BLOOD PRESSURE: 78 MMHG | BODY MASS INDEX: 23.31 KG/M2

## 2021-11-29 DIAGNOSIS — I50.32 CHRONIC DIASTOLIC HEART FAILURE: ICD-10-CM

## 2021-11-29 DIAGNOSIS — J06.9 VIRAL URI WITH COUGH: Primary | ICD-10-CM

## 2021-11-29 LAB
CTP QC/QA: YES
SARS-COV-2 RDRP RESP QL NAA+PROBE: NEGATIVE

## 2021-11-29 PROCEDURE — 99999 PR PBB SHADOW E&M-EST. PATIENT-LVL IV: ICD-10-PCS | Mod: PBBFAC,HCNC,, | Performed by: FAMILY MEDICINE

## 2021-11-29 PROCEDURE — U0002: ICD-10-PCS | Mod: QW,HCNC,S$GLB, | Performed by: FAMILY MEDICINE

## 2021-11-29 PROCEDURE — 99213 OFFICE O/P EST LOW 20 MIN: CPT | Mod: HCNC,S$GLB,, | Performed by: FAMILY MEDICINE

## 2021-11-29 PROCEDURE — U0002 COVID-19 LAB TEST NON-CDC: HCPCS | Mod: QW,HCNC,S$GLB, | Performed by: FAMILY MEDICINE

## 2021-11-29 PROCEDURE — 99999 PR PBB SHADOW E&M-EST. PATIENT-LVL IV: CPT | Mod: PBBFAC,HCNC,, | Performed by: FAMILY MEDICINE

## 2021-11-29 PROCEDURE — 1157F ADVNC CARE PLAN IN RCRD: CPT | Mod: HCNC,CPTII,S$GLB, | Performed by: FAMILY MEDICINE

## 2021-11-29 PROCEDURE — 1157F PR ADVANCE CARE PLAN OR EQUIV PRESENT IN MEDICAL RECORD: ICD-10-PCS | Mod: HCNC,CPTII,S$GLB, | Performed by: FAMILY MEDICINE

## 2021-11-29 PROCEDURE — 99213 PR OFFICE/OUTPT VISIT, EST, LEVL III, 20-29 MIN: ICD-10-PCS | Mod: HCNC,S$GLB,, | Performed by: FAMILY MEDICINE

## 2021-11-30 ENCOUNTER — LAB VISIT (OUTPATIENT)
Dept: LAB | Facility: HOSPITAL | Age: 81
End: 2021-11-30
Attending: INTERNAL MEDICINE
Payer: MEDICARE

## 2021-11-30 DIAGNOSIS — I25.10 CORONARY ARTERY DISEASE INVOLVING NATIVE CORONARY ARTERY OF NATIVE HEART WITHOUT ANGINA PECTORIS: Chronic | ICD-10-CM

## 2021-11-30 PROBLEM — I50.43 ACUTE ON CHRONIC COMBINED SYSTOLIC AND DIASTOLIC CONGESTIVE HEART FAILURE: Status: RESOLVED | Noted: 2021-11-05 | Resolved: 2021-11-30

## 2021-11-30 LAB
ALBUMIN SERPL BCP-MCNC: 3.4 G/DL (ref 3.5–5.2)
ALP SERPL-CCNC: 67 U/L (ref 55–135)
ALT SERPL W/O P-5'-P-CCNC: 31 U/L (ref 10–44)
ANION GAP SERPL CALC-SCNC: 10 MMOL/L (ref 8–16)
AST SERPL-CCNC: 36 U/L (ref 10–40)
BILIRUB SERPL-MCNC: 0.4 MG/DL (ref 0.1–1)
BUN SERPL-MCNC: 36 MG/DL (ref 8–23)
CALCIUM SERPL-MCNC: 9.3 MG/DL (ref 8.7–10.5)
CHLORIDE SERPL-SCNC: 109 MMOL/L (ref 95–110)
CHOLEST SERPL-MCNC: 131 MG/DL (ref 120–199)
CHOLEST/HDLC SERPL: 2.1 {RATIO} (ref 2–5)
CO2 SERPL-SCNC: 20 MMOL/L (ref 23–29)
CREAT SERPL-MCNC: 1.8 MG/DL (ref 0.5–1.4)
EST. GFR  (AFRICAN AMERICAN): 30.2 ML/MIN/1.73 M^2
EST. GFR  (NON AFRICAN AMERICAN): 26.2 ML/MIN/1.73 M^2
GLUCOSE SERPL-MCNC: 106 MG/DL (ref 70–110)
HDLC SERPL-MCNC: 61 MG/DL (ref 40–75)
HDLC SERPL: 46.6 % (ref 20–50)
LDLC SERPL CALC-MCNC: 55.2 MG/DL (ref 63–159)
NONHDLC SERPL-MCNC: 70 MG/DL
POTASSIUM SERPL-SCNC: 5 MMOL/L (ref 3.5–5.1)
PROT SERPL-MCNC: 6.6 G/DL (ref 6–8.4)
SODIUM SERPL-SCNC: 139 MMOL/L (ref 136–145)
TRIGL SERPL-MCNC: 74 MG/DL (ref 30–150)

## 2021-11-30 PROCEDURE — 80053 COMPREHEN METABOLIC PANEL: CPT | Mod: HCNC | Performed by: INTERNAL MEDICINE

## 2021-11-30 PROCEDURE — 80061 LIPID PANEL: CPT | Mod: HCNC | Performed by: INTERNAL MEDICINE

## 2021-11-30 PROCEDURE — 36415 COLL VENOUS BLD VENIPUNCTURE: CPT | Mod: HCNC | Performed by: INTERNAL MEDICINE

## 2021-12-01 ENCOUNTER — TELEPHONE (OUTPATIENT)
Dept: CARDIOLOGY | Facility: CLINIC | Age: 81
End: 2021-12-01
Payer: MEDICARE

## 2021-12-06 DIAGNOSIS — J30.2 SEASONAL ALLERGIC RHINITIS, UNSPECIFIED TRIGGER: ICD-10-CM

## 2021-12-06 DIAGNOSIS — D50.8 OTHER IRON DEFICIENCY ANEMIA: ICD-10-CM

## 2021-12-06 RX ORDER — IRON,CARBONYL/ASCORBIC ACID 100-250 MG
1 TABLET ORAL DAILY
Qty: 30 TABLET | Refills: 4 | Status: SHIPPED | OUTPATIENT
Start: 2021-12-06 | End: 2022-04-28

## 2021-12-06 RX ORDER — FUROSEMIDE 40 MG/1
40 TABLET ORAL DAILY
Qty: 90 TABLET | Refills: 3 | Status: SHIPPED | OUTPATIENT
Start: 2021-12-06 | End: 2022-08-03

## 2021-12-06 RX ORDER — FLUTICASONE PROPIONATE 50 MCG
2 SPRAY, SUSPENSION (ML) NASAL DAILY
Qty: 16 G | Refills: 11 | Status: SHIPPED | OUTPATIENT
Start: 2021-12-06

## 2021-12-13 ENCOUNTER — PATIENT OUTREACH (OUTPATIENT)
Dept: ADMINISTRATIVE | Facility: HOSPITAL | Age: 81
End: 2021-12-13
Payer: MEDICARE

## 2021-12-13 ENCOUNTER — PATIENT MESSAGE (OUTPATIENT)
Dept: ADMINISTRATIVE | Facility: HOSPITAL | Age: 81
End: 2021-12-13
Payer: MEDICARE

## 2021-12-15 ENCOUNTER — DOCUMENTATION ONLY (OUTPATIENT)
Dept: CARDIOLOGY | Facility: HOSPITAL | Age: 81
End: 2021-12-15
Payer: MEDICARE

## 2021-12-15 ENCOUNTER — HOSPITAL ENCOUNTER (OUTPATIENT)
Dept: CARDIOLOGY | Facility: HOSPITAL | Age: 81
Discharge: HOME | End: 2021-12-15
Attending: INTERNAL MEDICINE
Payer: MEDICARE

## 2021-12-15 DIAGNOSIS — Z95.0 PACEMAKER: ICD-10-CM

## 2021-12-15 DIAGNOSIS — I25.5 ISCHEMIC CARDIOMYOPATHY: ICD-10-CM

## 2022-01-07 DIAGNOSIS — J30.9 ALLERGIC RHINITIS, UNSPECIFIED SEASONALITY, UNSPECIFIED TRIGGER: ICD-10-CM

## 2022-01-07 RX ORDER — LEVOCETIRIZINE DIHYDROCHLORIDE 5 MG/1
5 TABLET, FILM COATED ORAL NIGHTLY
Qty: 30 TABLET | Refills: 11 | Status: SHIPPED | OUTPATIENT
Start: 2022-01-07 | End: 2022-08-01

## 2022-02-22 DIAGNOSIS — D84.9 IMMUNOSUPPRESSED STATUS: ICD-10-CM

## 2022-03-03 ENCOUNTER — OFFICE VISIT (OUTPATIENT)
Dept: CARDIOLOGY | Facility: CLINIC | Age: 82
End: 2022-03-03
Payer: MEDICARE

## 2022-03-03 VITALS
HEIGHT: 64 IN | SYSTOLIC BLOOD PRESSURE: 132 MMHG | WEIGHT: 143.06 LBS | OXYGEN SATURATION: 95 % | HEART RATE: 91 BPM | BODY MASS INDEX: 24.42 KG/M2 | DIASTOLIC BLOOD PRESSURE: 82 MMHG

## 2022-03-03 DIAGNOSIS — I95.1 POSTURAL HYPOTENSION: ICD-10-CM

## 2022-03-03 DIAGNOSIS — I25.5 ISCHEMIC CARDIOMYOPATHY: Chronic | ICD-10-CM

## 2022-03-03 DIAGNOSIS — Z91.89 AT HIGH RISK FOR HYPOCALCEMIA: ICD-10-CM

## 2022-03-03 DIAGNOSIS — Z86.79 HISTORY OF CONGESTIVE HEART FAILURE: ICD-10-CM

## 2022-03-03 DIAGNOSIS — Z95.0 CARDIAC PACEMAKER IN SITU: ICD-10-CM

## 2022-03-03 DIAGNOSIS — I48.0 PAROXYSMAL ATRIAL FIBRILLATION: ICD-10-CM

## 2022-03-03 DIAGNOSIS — F43.29 ADJUSTMENT DISORDER WITH EMOTIONAL DISTURBANCE: ICD-10-CM

## 2022-03-03 DIAGNOSIS — Z98.84 S/P GASTRIC BYPASS: Chronic | ICD-10-CM

## 2022-03-03 DIAGNOSIS — D64.9 CHRONIC ANEMIA: Chronic | ICD-10-CM

## 2022-03-03 DIAGNOSIS — G62.0 CHEMOTHERAPY-INDUCED NEUROPATHY: Chronic | ICD-10-CM

## 2022-03-03 DIAGNOSIS — I25.10 CORONARY ARTERY DISEASE INVOLVING NATIVE CORONARY ARTERY OF NATIVE HEART WITHOUT ANGINA PECTORIS: Primary | Chronic | ICD-10-CM

## 2022-03-03 DIAGNOSIS — M79.2 NEUROPATHIC PAIN OF RIGHT FOOT: ICD-10-CM

## 2022-03-03 DIAGNOSIS — G62.9 NEUROPATHY: ICD-10-CM

## 2022-03-03 DIAGNOSIS — R91.8 LUNG NODULES: ICD-10-CM

## 2022-03-03 DIAGNOSIS — R94.2 DIFFUSION CAPACITY OF LUNG (DL), DECREASED: ICD-10-CM

## 2022-03-03 DIAGNOSIS — E55.9 VITAMIN D DEFICIENCY: ICD-10-CM

## 2022-03-03 DIAGNOSIS — E87.5 HYPERKALEMIA: ICD-10-CM

## 2022-03-03 DIAGNOSIS — J84.10 CALCIFIED GRANULOMA OF LUNG: ICD-10-CM

## 2022-03-03 DIAGNOSIS — T45.1X5A CHEMOTHERAPY-INDUCED NEUROPATHY: Chronic | ICD-10-CM

## 2022-03-03 DIAGNOSIS — I70.0 CALCIFICATION OF AORTA: Chronic | ICD-10-CM

## 2022-03-03 PROCEDURE — 99214 OFFICE O/P EST MOD 30 MIN: CPT | Mod: S$GLB,,, | Performed by: INTERNAL MEDICINE

## 2022-03-03 PROCEDURE — 99499 UNLISTED E&M SERVICE: CPT | Mod: S$GLB,,, | Performed by: INTERNAL MEDICINE

## 2022-03-03 PROCEDURE — 1157F ADVNC CARE PLAN IN RCRD: CPT | Mod: CPTII,S$GLB,, | Performed by: INTERNAL MEDICINE

## 2022-03-03 PROCEDURE — 3075F SYST BP GE 130 - 139MM HG: CPT | Mod: CPTII,S$GLB,, | Performed by: INTERNAL MEDICINE

## 2022-03-03 PROCEDURE — 99214 PR OFFICE/OUTPT VISIT, EST, LEVL IV, 30-39 MIN: ICD-10-PCS | Mod: S$GLB,,, | Performed by: INTERNAL MEDICINE

## 2022-03-03 PROCEDURE — 99499 RISK ADDL DX/OHS AUDIT: ICD-10-PCS | Mod: S$GLB,,, | Performed by: INTERNAL MEDICINE

## 2022-03-03 PROCEDURE — 99999 PR PBB SHADOW E&M-EST. PATIENT-LVL IV: ICD-10-PCS | Mod: PBBFAC,,, | Performed by: INTERNAL MEDICINE

## 2022-03-03 PROCEDURE — 1157F PR ADVANCE CARE PLAN OR EQUIV PRESENT IN MEDICAL RECORD: ICD-10-PCS | Mod: CPTII,S$GLB,, | Performed by: INTERNAL MEDICINE

## 2022-03-03 PROCEDURE — 1159F MED LIST DOCD IN RCRD: CPT | Mod: CPTII,S$GLB,, | Performed by: INTERNAL MEDICINE

## 2022-03-03 PROCEDURE — 3079F PR MOST RECENT DIASTOLIC BLOOD PRESSURE 80-89 MM HG: ICD-10-PCS | Mod: CPTII,S$GLB,, | Performed by: INTERNAL MEDICINE

## 2022-03-03 PROCEDURE — 1159F PR MEDICATION LIST DOCUMENTED IN MEDICAL RECORD: ICD-10-PCS | Mod: CPTII,S$GLB,, | Performed by: INTERNAL MEDICINE

## 2022-03-03 PROCEDURE — 1100F PR PT FALLS ASSESS DOC 2+ FALLS/FALL W/INJURY/YR: ICD-10-PCS | Mod: CPTII,S$GLB,, | Performed by: INTERNAL MEDICINE

## 2022-03-03 PROCEDURE — 99999 PR PBB SHADOW E&M-EST. PATIENT-LVL IV: CPT | Mod: PBBFAC,,, | Performed by: INTERNAL MEDICINE

## 2022-03-03 PROCEDURE — 1126F AMNT PAIN NOTED NONE PRSNT: CPT | Mod: CPTII,S$GLB,, | Performed by: INTERNAL MEDICINE

## 2022-03-03 PROCEDURE — 3079F DIAST BP 80-89 MM HG: CPT | Mod: CPTII,S$GLB,, | Performed by: INTERNAL MEDICINE

## 2022-03-03 PROCEDURE — 1126F PR PAIN SEVERITY QUANTIFIED, NO PAIN PRESENT: ICD-10-PCS | Mod: CPTII,S$GLB,, | Performed by: INTERNAL MEDICINE

## 2022-03-03 PROCEDURE — 1160F RVW MEDS BY RX/DR IN RCRD: CPT | Mod: CPTII,S$GLB,, | Performed by: INTERNAL MEDICINE

## 2022-03-03 PROCEDURE — 1100F PTFALLS ASSESS-DOCD GE2>/YR: CPT | Mod: CPTII,S$GLB,, | Performed by: INTERNAL MEDICINE

## 2022-03-03 PROCEDURE — 3288F FALL RISK ASSESSMENT DOCD: CPT | Mod: CPTII,S$GLB,, | Performed by: INTERNAL MEDICINE

## 2022-03-03 PROCEDURE — 3075F PR MOST RECENT SYSTOLIC BLOOD PRESS GE 130-139MM HG: ICD-10-PCS | Mod: CPTII,S$GLB,, | Performed by: INTERNAL MEDICINE

## 2022-03-03 PROCEDURE — 3288F PR FALLS RISK ASSESSMENT DOCUMENTED: ICD-10-PCS | Mod: CPTII,S$GLB,, | Performed by: INTERNAL MEDICINE

## 2022-03-03 PROCEDURE — 1160F PR REVIEW ALL MEDS BY PRESCRIBER/CLIN PHARMACIST DOCUMENTED: ICD-10-PCS | Mod: CPTII,S$GLB,, | Performed by: INTERNAL MEDICINE

## 2022-03-03 NOTE — PROGRESS NOTES
"Subjective:   Patient ID:  Violet Swenson is a 82 y.o. female who presents for follow up of Acute on chronic combined systolic and diastolic congestive      HPI  9/17/2020   81 yo female with ;ymphoma  S/p pcae cad s/p stent ckd heart failure ios here for f/u she has low back pain limited has had recurrent episodes of intrascapular pain radiating to left arm feels like muscle spasm none with exertion. No chf symptoms no leg swelling tia claudication chest pain syncope near syncope/.no palpitation. Has some improvement in appetite  .  11/16  Violet Swenson is a 80 y.o. female patient with a h/o anemia, anxiety, atrial flutter, lymphoma large cell B, CHF, CAD, depression, GERD, gout, heart failure, HLD, HTN, hypothyroidism kidney disease, lung nodule, obesity, metronic pacemaker, polyneuropathy, who presents to the Emergency Department for evaluation of fatigue which onset this morning. Per [facility], the pt has been intermittently stuttering her speech and was hypoglycemic upon arriving on scene. Pt's daughter states that the pt woke up and was chatting to her before she left the house around 0800 this morning. The pt reports falling back asleep and waking up around 0930 feeling very weak and fatigued. She states, "when I rolled over after waking up, it felt like someone was pushing me back in bed." Associated sxs include R arm weakness, generalized weakness, slurred speech, and trouble ambulating. Patient denies any fever, SOB, CP, n/v, numbness, dizziness, and all other sxs at this time. In the ED, H/H 8.9/28.6, Creatinine 1.5, , TSH 4.087, CT head with no acute findings. Tele-stroke recommended MRI/MRA brain, MRA neck lipid panel, hemoglobin A1c. Add ASA to Plavix if x 30 days if no contraindication. Atorvastatin 40 mg daily. Neurology, PT/speech consults. Patient placed in observation for CVA rule out under the care of hospital medicine.      * No surgery found *       Hospital " Course:   Place an observation for evaluation and treatment of right arm weakness, slurred speech, and gait instability. Symptoms concerning for acute stroke. Initially perform a CT of the head which showed no acute findings. Patient was unable to perform an MRI or MRA due to having an implantable device. Empirically treated for transient ischemic attack versus stroke. Performed an interval CT scan of the head which was also negative. She was evaluated by neurology who assisted with management. Neurology determined that even though we were unable to get positive imaging findings that she should be treated as likely having had an acute stroke.  Physical and occupational therapy evaluated the patient and recommended outpatient therapy. Speech language pathology evaluated the patient and recommended no restrictions. Discharge plan to return home and continue medication plan outpatient physical therapy and follow-up with primary care as needed         11/27/2020  Had chest pain since yesterday it is tight all over chest no associated shortness of breath. Her bp is elevated she cannot get comfortable feels like tightness . Has no leg swelling her speech and vision improved but he rrt sided weakness still evident she is taking her antiplatelet. She has not taken her meds this  Morning. She is not feeling good during the vist the pain has been ongoing since 4 pm yesterday. I gave her a s/l nitro and she improved.      12/14/2020     Here for f/u after hospital admit. She r/o for mi had cardiolite negative for ischemia. Her medical therapy was adjusted taken off gabapentin she has her aches back all over . She is not able to sleep at nite. No further cardiac symptomatology.     6/7/2021  Here for f/u her bp was elevated she ate salt with watermelon has no new complaints of leg swelling shortness of breath tia claudication syncope near syncope no blurred vision. She is only n lopressor half pill bid due yo  hypotension/.      11/4/2021   Has been having pnd over the past 2 weeks she wakes up short of breath has to get up move around. Has also some shortness of breath during the day. She has no palpitation. Has  No leg swelling her bp has been elevated. Has  Been compliant with diet. Has continous chest tightness.has no exertional angina. The chest pain is chronic she had is the reason she was sent top er last year. cardiolite was negative.      11/5/2021   Pacer interrogation showed afib since september 20 nitropach added this morning her bp is elevated had her echo today no labs done .she is on asa and plavix no arb      11/24/2021   [facility] PLACED ON [facility] HAD SIGNIFICANT MITRAL REGURGITATION HAD HELGA CARDIOVERSION TO SR HAD CARDIOLITE WAS NEGATIVE    HER CHEST PAIN SHORTNESS OF BREATH RESOLVED SHE IS COMPLIANT WITH MEDS HTN CONTROLLED NO DIZZINESS LIGHTHEADEDNESS.     3/3/2022  Here frof /u no new complaints of chest pain shortness of breath palpitation syncope near syncope compliant with meds. Had falls twice uses her walker. No chf symptoms opr angina.  Past Medical History:   Diagnosis Date    Age-related osteoporosis without current pathological fracture 8/20/2018    Anemia     Anxiety     Arthritis     Atrial flutter     Cancer     lymphoma Large cell B    CHF (congestive heart failure)     Chronic anemia 4/26/2017    Chronic midline low back pain with right-sided sciatica 8/20/2018    Coronary artery disease     01/2015 C patent LCX. 50% stenosis in LAD and RCA.      Depression     Disorder of kidney and ureter     Encounter for blood transfusion     GERD (gastroesophageal reflux disease)     Gout, arthritis     Heart failure     Hx of psychiatric care     Hyperlipidemia     Hypertension     Hypothyroidism     Immune deficiency disorder     Kidney disease     Lung nodule 2014    RML--stable    Obesity     Pacemaker      "Metronic    Paroxysmal atrial fibrillation 3/15/2018    Pneumonia     Polyneuropathy     chemo induced    Psychiatric problem     Stroke 2020    Tobacco dependence     quit 1976    Trouble in sleeping     Unstable angina 2020       Past Surgical History:   Procedure Laterality Date    APPENDECTOMY  1966 approx    CARDIAC PACEMAKER PLACEMENT  2015    CHOLECYSTECTOMY  1993    incidental at time of gastric bypass    COLON SURGERY Right 2017    hemicolectomy    COLONOSCOPY N/A 2017    Procedure: COLONOSCOPY;  Surgeon: Tye Enamorado MD;  Location: [facility];  Service: Endoscopy;  Laterality: N/A;    COLONOSCOPY N/A 2018    Procedure: COLONOSCOPY;  Surgeon: Saúl Arthur III, MD;  Location: [facility];  Service: Endoscopy;  Laterality: N/A;    CORONARY ANGIOPLASTY  2014    CORONARY STENT PLACEMENT  2014    ESOPHAGOGASTRODUODENOSCOPY N/A 2018    Procedure: EGD (ESOPHAGOGASTRODUODENOSCOPY);  Surgeon: Saúl Arthur III, MD;  Location: [facility];  Service: Endoscopy;  Laterality: N/A;    GASTRIC BYPASS      with incidental choly    HERNIA REPAIR      INJECTION OF ANESTHETIC AGENT INTO SACROILIAC JOINT Right 10/8/2020    Procedure: Right BLOCK, SACROILIAC JOINT with RN IV sedation;  Surgeon: Madi Anton MD;  [facility];  Service: Pain Management;  Laterality: Right;    JOINT REPLACEMENT Bilateral     3 months apart    TONSILLECTOMY         Social History     Tobacco Use    Smoking status: Former Smoker     Packs/day: 2.00     Years: 6.00     Pack years: 12.00     Quit date: 3/15/1976     Years since quittin.9    Smokeless tobacco: Never Used   Substance Use Topics    Alcohol use: No     Alcohol/week: 0.0 standard drinks    Drug use: No       Family History   Problem Relation Age of Onset    Heart disease Mother     Hypertension Mother     Cataracts Mother     Stomach cancer Father         "ulcers that turned to cancer" " "   Cancer Father         stomach    Pancreatic cancer Sister     Cancer Sister         pancreatic    Leukemia Brother         "leukemia which led to intestinal cancer"    Cataracts Brother     Cancer Brother         leukemia then later stomach cancer    Drug abuse Son     Cancer Son         prostate cancer    Prostate cancer Son     COPD Daughter     Asthma Daughter     Hypertension Maternal Grandfather     Stroke Maternal Grandfather     Alcohol abuse Neg Hx     Diabetes Neg Hx     Mental retardation Neg Hx     Mental illness Neg Hx        Current Outpatient Medications   Medication Sig    allopurinoL (ZYLOPRIM) 300 MG tablet Take 1 tablet (300 mg total) by mouth once daily.    ascorbic acid, vitamin C, (VITAMIN C) 100 MG tablet Take by mouth once daily.    aspirin (ECOTRIN) 81 MG EC tablet Take 81 mg by mouth nightly.     busPIRone (BUSPAR) 7.5 MG tablet TAKE ONE TABLET BY MOUTH THREE TIMES DAILY    calcitRIOL (ROCALTROL) 0.25 MCG Cap TAKE ONE CAPSULE BY MOUTH EVERY MONDAY, WEDNESDAY AND FRIDAY    clopidogreL (PLAVIX) 75 mg tablet TAKE ONE TABLET BY MOUTH EVERY DAY    diclofenac sodium 1 % Gel Apply 2 g topically once daily. Apply 2 g over painful joints once or twice a day.    DULoxetine (CYMBALTA) 30 MG capsule Take 30 mg by mouth once daily.    ergocalciferol (ERGOCALCIFEROL) 50,000 unit Cap Take 1 capsule (50,000 Units total) by mouth every 7 days.    fluticasone propionate (FLONASE) 50 mcg/actuation nasal spray 2 sprays (100 mcg total) by Each Nostril route once daily.    furosemide (LASIX) 40 MG tablet Take 1 tablet (40 mg total) by mouth once daily.    gabapentin (NEURONTIN) 300 MG capsule Take 1 capsule (300 mg total) by mouth every evening. Before bed    iron-vitamin C 100-250 mg, ICAR-C, 100-250 mg Tab Take 1 tablet by mouth once daily.    levocetirizine (XYZAL) 5 MG tablet Take 1 tablet (5 mg total) by mouth every evening.    levothyroxine (SYNTHROID) 75 MCG tablet TAKE " ONE TABLET BY MOUTH EVERY DAY BEFORE BREAKFAST    metoprolol tartrate (LOPRESSOR) 25 MG tablet TAKE ONE TABLET BY MOUTH TWICE DAILY    mirtazapine (REMERON SOL-TAB) 15 MG disintegrating tablet TAKE ONE TABLET UNDER THE TONGUE AT BEDTIME FOR INSOMNIA    multivitamin (THERAGRAN) per tablet Take 1 tablet by mouth once daily.    nitroGLYCERIN 0.2 mg/hr TD PT24 (NITRODUR) 0.2 mg/hr Place 1 patch onto the skin once daily.    pravastatin (PRAVACHOL) 40 MG tablet TAKE ONE TABLET BY MOUTH ONCE DAILY    sertraline (ZOLOFT) 100 MG tablet TAKE ONE AND ONE-HALF TABLETS (150MG TOTAL) BY MOUTH ONCE DAILY    sodium bicarbonate 650 MG tablet Take 1 tablet (650 mg total) by mouth 2 (two) times daily.    ZINC ACETATE ORAL Take 250 mg by mouth once daily.      Current Facility-Administered Medications   Medication    denosumab (PROLIA) injection 60 mg     Current Outpatient Medications on File Prior to Visit   Medication Sig    allopurinoL (ZYLOPRIM) 300 MG tablet Take 1 tablet (300 mg total) by mouth once daily.    ascorbic acid, vitamin C, (VITAMIN C) 100 MG tablet Take by mouth once daily.    aspirin (ECOTRIN) 81 MG EC tablet Take 81 mg by mouth nightly.     busPIRone (BUSPAR) 7.5 MG tablet TAKE ONE TABLET BY MOUTH THREE TIMES DAILY    calcitRIOL (ROCALTROL) 0.25 MCG Cap TAKE ONE CAPSULE BY MOUTH EVERY MONDAY, WEDNESDAY AND FRIDAY    clopidogreL (PLAVIX) 75 mg tablet TAKE ONE TABLET BY MOUTH EVERY DAY    diclofenac sodium 1 % Gel Apply 2 g topically once daily. Apply 2 g over painful joints once or twice a day.    DULoxetine (CYMBALTA) 30 MG capsule Take 30 mg by mouth once daily.    ergocalciferol (ERGOCALCIFEROL) 50,000 unit Cap Take 1 capsule (50,000 Units total) by mouth every 7 days.    fluticasone propionate (FLONASE) 50 mcg/actuation nasal spray 2 sprays (100 mcg total) by Each Nostril route once daily.    furosemide (LASIX) 40 MG tablet Take 1 tablet (40 mg total) by mouth once daily.    gabapentin  (NEURONTIN) 300 MG capsule Take 1 capsule (300 mg total) by mouth every evening. Before bed    iron-vitamin C 100-250 mg, ICAR-C, 100-250 mg Tab Take 1 tablet by mouth once daily.    levocetirizine (XYZAL) 5 MG tablet Take 1 tablet (5 mg total) by mouth every evening.    levothyroxine (SYNTHROID) 75 MCG tablet TAKE ONE TABLET BY MOUTH EVERY DAY BEFORE BREAKFAST    metoprolol tartrate (LOPRESSOR) 25 MG tablet TAKE ONE TABLET BY MOUTH TWICE DAILY    mirtazapine (REMERON SOL-TAB) 15 MG disintegrating tablet TAKE ONE TABLET UNDER THE TONGUE AT BEDTIME FOR INSOMNIA    multivitamin (THERAGRAN) per tablet Take 1 tablet by mouth once daily.    nitroGLYCERIN 0.2 mg/hr TD PT24 (NITRODUR) 0.2 mg/hr Place 1 patch onto the skin once daily.    pravastatin (PRAVACHOL) 40 MG tablet TAKE ONE TABLET BY MOUTH ONCE DAILY    sertraline (ZOLOFT) 100 MG tablet TAKE ONE AND ONE-HALF TABLETS (150MG TOTAL) BY MOUTH ONCE DAILY    sodium bicarbonate 650 MG tablet Take 1 tablet (650 mg total) by mouth 2 (two) times daily.    ZINC ACETATE ORAL Take 250 mg by mouth once daily.      Current Facility-Administered Medications on File Prior to Visit   Medication    denosumab (PROLIA) injection 60 mg     Review of patient's allergies indicates:   Allergen Reactions    Corticosteroids (glucocorticoids) Nausea Only and Other (See Comments)     Stomach pain, dizziness, headache    Oxycodone Other (See Comments)     Blood pressure dropped     Review of Systems   Constitutional: Negative for diaphoresis, malaise/fatigue and weight gain.   HENT: Negative for hoarse voice.    Eyes: Negative for double vision and visual disturbance.   Cardiovascular: Negative for chest pain, claudication, cyanosis, dyspnea on exertion, irregular heartbeat, leg swelling, near-syncope, orthopnea, palpitations, paroxysmal nocturnal dyspnea and syncope.   Respiratory: Negative for cough, hemoptysis, shortness of breath and snoring.    Hematologic/Lymphatic: Negative  for bleeding problem. Does not bruise/bleed easily.   Skin: Negative for color change and poor wound healing.   Musculoskeletal: Positive for falls. Negative for muscle cramps, muscle weakness and myalgias.   Gastrointestinal: Negative for bloating, abdominal pain, change in bowel habit, diarrhea, heartburn, hematemesis, hematochezia, melena and nausea.   Neurological: Positive for loss of balance. Negative for excessive daytime sleepiness, dizziness, headaches, light-headedness, numbness and weakness.   Psychiatric/Behavioral: Negative for memory loss. The patient does not have insomnia.    Allergic/Immunologic: Negative for hives.       Objective:   Physical Exam  Vitals and nursing note reviewed.   Constitutional:       General: She is not in acute distress.     Appearance: Normal appearance. She is well-developed. She is not ill-appearing.   HENT:      Head: Normocephalic and atraumatic.   Eyes:      General: No scleral icterus.     Pupils: Pupils are equal, round, and reactive to light.   Neck:      Thyroid: No thyromegaly.      Vascular: Normal carotid pulses. No carotid bruit, hepatojugular reflux or JVD.      Trachea: No tracheal deviation.   Cardiovascular:      Rate and Rhythm: Normal rate and regular rhythm.      Pulses: Normal pulses.           Carotid pulses are 2+ on the right side and 2+ on the left side.       Radial pulses are 2+ on the right side and 2+ on the left side.        Femoral pulses are 2+ on the right side and 2+ on the left side.       Popliteal pulses are 2+ on the right side and 2+ on the left side.        Dorsalis pedis pulses are 2+ on the right side and 2+ on the left side.        Posterior tibial pulses are 2+ on the right side and 2+ on the left side.      Heart sounds: Murmur heard.   High-pitched blowing holosystolic murmur is present with a grade of 2/6 at the apex.    No friction rub. No gallop.   Pulmonary:      Effort: Pulmonary effort is normal. No respiratory distress.     "  Breath sounds: Normal breath sounds. No wheezing, rhonchi or rales.      Comments: Pacer site well healed.   Chest:      Chest wall: No tenderness.   Abdominal:      General: Bowel sounds are normal. There is no abdominal bruit.      Palpations: Abdomen is soft. There is no hepatomegaly or pulsatile mass.      Tenderness: There is no abdominal tenderness.   Musculoskeletal:      Right shoulder: No deformity.      Cervical back: Normal range of motion and neck supple.      Right lower leg: No edema.      Left lower leg: No edema.   Skin:     General: Skin is warm and dry.      Capillary Refill: Capillary refill takes less than 2 seconds.      Findings: No erythema or rash.      Nails: There is no clubbing.   Neurological:      Mental Status: She is alert and oriented to person, place, and time.      Cranial Nerves: No cranial nerve deficit.      Coordination: Coordination normal.   Psychiatric:         Speech: Speech normal.         Behavior: Behavior normal.         Judgment: Judgment normal.       Vitals:    03/03/22 0949 03/03/22 0952   BP: 120/88 132/82   BP Location: Left arm Right arm   Patient Position: Sitting Sitting   BP Method: Medium (Manual) Medium (Manual)   Pulse: 91    SpO2: 95%    Weight: 64.9 kg (143 lb 1.3 oz)    Height: 5' 4" (1.626 m)      Lab Results   Component Value Date    CHOL 131 11/30/2021    CHOL 140 06/01/2021    CHOL 128 03/16/2021     Lab Results   Component Value Date    HDL 61 11/30/2021    HDL 78 (H) 06/01/2021    HDL 65 03/16/2021     Lab Results   Component Value Date    LDLCALC 55.2 (L) 11/30/2021    LDLCALC 43.2 (L) 06/01/2021    LDLCALC 45.4 (L) 03/16/2021     Lab Results   Component Value Date    TRIG 74 11/30/2021    TRIG 94 06/01/2021    TRIG 88 03/16/2021     Lab Results   Component Value Date    CHOLHDL 46.6 11/30/2021    CHOLHDL 55.7 (H) 06/01/2021    CHOLHDL 50.8 (H) 03/16/2021       Chemistry        Component Value Date/Time     11/30/2021 0928    K 5.0 " 11/30/2021 0928     11/30/2021 0928    CO2 20 (L) 11/30/2021 0928    BUN 36 (H) 11/30/2021 0928    CREATININE 1.8 (H) 11/30/2021 0928     11/30/2021 0928        Component Value Date/Time    CALCIUM 9.3 11/30/2021 0928    ALKPHOS 67 11/30/2021 0928    AST 36 11/30/2021 0928    ALT 31 11/30/2021 0928    BILITOT 0.4 11/30/2021 0928    ESTGFRAFRICA 30.2 (A) 11/30/2021 0928    EGFRNONAA 26.2 (A) 11/30/2021 0928          Lab Results   Component Value Date    TSH 7.049 (H) 03/16/2021     Lab Results   Component Value Date    INR 0.9 11/07/2021    INR 1.0 11/06/2021    INR 1.0 11/27/2020     Lab Results   Component Value Date    WBC 9.41 11/09/2021    HGB 10.8 (L) 11/09/2021    HCT 35.0 (L) 11/09/2021     (H) 11/09/2021     11/09/2021     BMP  Sodium   Date Value Ref Range Status   11/30/2021 139 136 - 145 mmol/L Final     Potassium   Date Value Ref Range Status   11/30/2021 5.0 3.5 - 5.1 mmol/L Final     Chloride   Date Value Ref Range Status   11/30/2021 109 95 - 110 mmol/L Final     CO2   Date Value Ref Range Status   11/30/2021 20 (L) 23 - 29 mmol/L Final     BUN   Date Value Ref Range Status   11/30/2021 36 (H) 8 - 23 mg/dL Final     Creatinine   Date Value Ref Range Status   11/30/2021 1.8 (H) 0.5 - 1.4 mg/dL Final     Calcium   Date Value Ref Range Status   11/30/2021 9.3 8.7 - 10.5 mg/dL Final     Anion Gap   Date Value Ref Range Status   11/30/2021 10 8 - 16 mmol/L Final     eGFR if    Date Value Ref Range Status   11/30/2021 30.2 (A) >60 mL/min/1.73 m^2 Final     eGFR if non    Date Value Ref Range Status   11/30/2021 26.2 (A) >60 mL/min/1.73 m^2 Final     Comment:     Calculation used to obtain the estimated glomerular filtration  rate (eGFR) is the CKD-EPI equation.        CrCl cannot be calculated (Patient's most recent lab result is older than the maximum 7 days allowed.).    Assessment:     1. Coronary artery disease : multiple vessels, s/p PTCA    2.  Chemotherapy-induced neuropathy    3. Neuropathic pain of right foot    4. Neuropathy    5. Calcified granuloma of lung    6. Adjustment disorder with emotional disturbance    7. Paroxysmal atrial fibrillation    8. Calcification of aorta    9. Diffusion capacity of lung (dl), decreased    10. Lung nodules    11. Cardiac pacemaker in situ    12. History of congestive heart failure    13. Ischemic cardiomyopathy    14. Postural hypotension    15. At high risk for hypocalcemia    16. Hyperkalemia    17. Chronic anemia    18. S/P gastric bypass    19. Vitamin D deficiency      afib non clinically on appropriate therapy no cns events will continue same    cad no angina stable continue same   chf well compensated no functional limitation stable will continue the same.    hlp on target will repeat labs.   htn controlled. Continue same   tsh elevated no repeat seems euthyroid clinically will repeat labs.   S/p pacer follows up in pacer clinic appropriate function.   Plan:   Add tsh lipid to labs on 3/30   Continue current therapy  Cardiac low salt diet.  Risk factor modification and excercise program.  F/u in 6 months

## 2022-03-07 RX ORDER — CALCITRIOL 0.25 UG/1
CAPSULE ORAL
Qty: 12 CAPSULE | Refills: 5 | Status: SHIPPED | OUTPATIENT
Start: 2022-03-07 | End: 2022-08-05 | Stop reason: SDUPTHER

## 2022-03-29 ENCOUNTER — TELEPHONE (OUTPATIENT)
Dept: RHEUMATOLOGY | Facility: CLINIC | Age: 82
End: 2022-03-29
Payer: MEDICARE

## 2022-03-30 ENCOUNTER — TELEPHONE (OUTPATIENT)
Dept: CARDIOLOGY | Facility: CLINIC | Age: 82
End: 2022-03-30
Payer: MEDICARE

## 2022-03-30 ENCOUNTER — INFUSION (OUTPATIENT)
Dept: INFUSION THERAPY | Facility: HOSPITAL | Age: 82
End: 2022-03-30
Attending: INTERNAL MEDICINE
Payer: MEDICARE

## 2022-03-30 ENCOUNTER — OFFICE VISIT (OUTPATIENT)
Dept: RHEUMATOLOGY | Facility: CLINIC | Age: 82
End: 2022-03-30
Payer: MEDICARE

## 2022-03-30 VITALS
SYSTOLIC BLOOD PRESSURE: 137 MMHG | BODY MASS INDEX: 24.39 KG/M2 | DIASTOLIC BLOOD PRESSURE: 88 MMHG | WEIGHT: 142.88 LBS | HEIGHT: 64 IN | HEART RATE: 87 BPM

## 2022-03-30 VITALS
RESPIRATION RATE: 16 BRPM | OXYGEN SATURATION: 98 % | SYSTOLIC BLOOD PRESSURE: 148 MMHG | DIASTOLIC BLOOD PRESSURE: 85 MMHG | TEMPERATURE: 98 F | HEART RATE: 71 BPM

## 2022-03-30 DIAGNOSIS — M15.9 PRIMARY OSTEOARTHRITIS INVOLVING MULTIPLE JOINTS: ICD-10-CM

## 2022-03-30 DIAGNOSIS — M1A.09X0 IDIOPATHIC CHRONIC GOUT, MULTIPLE SITES, WITHOUT TOPHUS (TOPHI): ICD-10-CM

## 2022-03-30 DIAGNOSIS — M81.0 AGE-RELATED OSTEOPOROSIS WITHOUT CURRENT PATHOLOGICAL FRACTURE: Primary | ICD-10-CM

## 2022-03-30 DIAGNOSIS — E03.9 HYPOTHYROIDISM (ACQUIRED): ICD-10-CM

## 2022-03-30 DIAGNOSIS — E55.9 VITAMIN D DEFICIENCY: ICD-10-CM

## 2022-03-30 PROCEDURE — 63600175 PHARM REV CODE 636 W HCPCS: Mod: JG | Performed by: INTERNAL MEDICINE

## 2022-03-30 PROCEDURE — 1157F PR ADVANCE CARE PLAN OR EQUIV PRESENT IN MEDICAL RECORD: ICD-10-PCS | Mod: CPTII,S$GLB,, | Performed by: INTERNAL MEDICINE

## 2022-03-30 PROCEDURE — 3079F PR MOST RECENT DIASTOLIC BLOOD PRESSURE 80-89 MM HG: ICD-10-PCS | Mod: CPTII,S$GLB,, | Performed by: INTERNAL MEDICINE

## 2022-03-30 PROCEDURE — 1160F PR REVIEW ALL MEDS BY PRESCRIBER/CLIN PHARMACIST DOCUMENTED: ICD-10-PCS | Mod: CPTII,S$GLB,, | Performed by: INTERNAL MEDICINE

## 2022-03-30 PROCEDURE — 1100F PTFALLS ASSESS-DOCD GE2>/YR: CPT | Mod: CPTII,S$GLB,, | Performed by: INTERNAL MEDICINE

## 2022-03-30 PROCEDURE — 99999 PR PBB SHADOW E&M-EST. PATIENT-LVL V: CPT | Mod: PBBFAC,,, | Performed by: INTERNAL MEDICINE

## 2022-03-30 PROCEDURE — 3075F SYST BP GE 130 - 139MM HG: CPT | Mod: CPTII,S$GLB,, | Performed by: INTERNAL MEDICINE

## 2022-03-30 PROCEDURE — 3288F PR FALLS RISK ASSESSMENT DOCUMENTED: ICD-10-PCS | Mod: CPTII,S$GLB,, | Performed by: INTERNAL MEDICINE

## 2022-03-30 PROCEDURE — 1159F PR MEDICATION LIST DOCUMENTED IN MEDICAL RECORD: ICD-10-PCS | Mod: CPTII,S$GLB,, | Performed by: INTERNAL MEDICINE

## 2022-03-30 PROCEDURE — 1125F AMNT PAIN NOTED PAIN PRSNT: CPT | Mod: CPTII,S$GLB,, | Performed by: INTERNAL MEDICINE

## 2022-03-30 PROCEDURE — 3079F DIAST BP 80-89 MM HG: CPT | Mod: CPTII,S$GLB,, | Performed by: INTERNAL MEDICINE

## 2022-03-30 PROCEDURE — 1125F PR PAIN SEVERITY QUANTIFIED, PAIN PRESENT: ICD-10-PCS | Mod: CPTII,S$GLB,, | Performed by: INTERNAL MEDICINE

## 2022-03-30 PROCEDURE — 99999 PR PBB SHADOW E&M-EST. PATIENT-LVL V: ICD-10-PCS | Mod: PBBFAC,,, | Performed by: INTERNAL MEDICINE

## 2022-03-30 PROCEDURE — 3075F PR MOST RECENT SYSTOLIC BLOOD PRESS GE 130-139MM HG: ICD-10-PCS | Mod: CPTII,S$GLB,, | Performed by: INTERNAL MEDICINE

## 2022-03-30 PROCEDURE — 99214 OFFICE O/P EST MOD 30 MIN: CPT | Mod: S$GLB,,, | Performed by: INTERNAL MEDICINE

## 2022-03-30 PROCEDURE — 1160F RVW MEDS BY RX/DR IN RCRD: CPT | Mod: CPTII,S$GLB,, | Performed by: INTERNAL MEDICINE

## 2022-03-30 PROCEDURE — 3288F FALL RISK ASSESSMENT DOCD: CPT | Mod: CPTII,S$GLB,, | Performed by: INTERNAL MEDICINE

## 2022-03-30 PROCEDURE — 1100F PR PT FALLS ASSESS DOC 2+ FALLS/FALL W/INJURY/YR: ICD-10-PCS | Mod: CPTII,S$GLB,, | Performed by: INTERNAL MEDICINE

## 2022-03-30 PROCEDURE — 99214 PR OFFICE/OUTPT VISIT, EST, LEVL IV, 30-39 MIN: ICD-10-PCS | Mod: S$GLB,,, | Performed by: INTERNAL MEDICINE

## 2022-03-30 PROCEDURE — 96372 THER/PROPH/DIAG INJ SC/IM: CPT

## 2022-03-30 PROCEDURE — 1159F MED LIST DOCD IN RCRD: CPT | Mod: CPTII,S$GLB,, | Performed by: INTERNAL MEDICINE

## 2022-03-30 PROCEDURE — 1157F ADVNC CARE PLAN IN RCRD: CPT | Mod: CPTII,S$GLB,, | Performed by: INTERNAL MEDICINE

## 2022-03-30 RX ORDER — PROPOFOL 10 MG/ML
INJECTION, EMULSION INTRAVENOUS
COMMUNITY
Start: 2021-11-11

## 2022-03-30 RX ORDER — APIXABAN 2.5 MG/1
TABLET, FILM COATED ORAL
COMMUNITY
Start: 2022-03-07 | End: 2022-07-28

## 2022-03-30 RX ORDER — ERGOCALCIFEROL 1.25 MG/1
50000 CAPSULE ORAL
Qty: 12 CAPSULE | Refills: 3 | Status: SHIPPED | OUTPATIENT
Start: 2022-03-30 | End: 2023-04-24

## 2022-03-30 RX ADMIN — DENOSUMAB 60 MG: 60 INJECTION SUBCUTANEOUS at 11:03

## 2022-03-30 NOTE — NURSING
Prolia 60 mg q 6 months  Last dose given-9/21/22    Any invasive dental procedures in past 3 months or upcoming 3 months: denies    Last Rheumatology provider visit- Seen by Dr. PANIAGUA on 3/30/22    Recent labs? 3/30/22;  CKD pt needing repeat labs in 10 days-yes-4/11/22   Lab Results   Component Value Date    CALCIUM 10.1 03/30/2022     Lab Results   Component Value Date    CREATININE 2.3 (H) 03/30/2022     Lab Results   Component Value Date    ESTGFRAFRICA 22 (A) 03/30/2022     Lab Results   Component Value Date    EGFRNONAA 19 (A) 03/30/2022     Lab Results   Component Value Date    WCNJSWUP17AQ 30 11/15/2021          Lot #- [id number]  Expiration Date- 05/24       Prolia 60 mg/ml administered SQ to Left upper, posterior arm. Tolerated without any complaints. No redness, swelling, or drainage noted to site. Instructed to remain in clinic 15 minutes after administration to monitor for any s/sx of reaction. Pt instructed on signs and symptoms of reaction to report. Verbalizes understanding.

## 2022-03-30 NOTE — PATIENT INSTRUCTIONS
Please take levothyroxine on empty stomach and wait 30-60 minutes before eating or taking other medications .

## 2022-03-30 NOTE — TELEPHONE ENCOUNTER
.M for patent to call the office regarding results.      ----- Message from Nader Gil MD sent at 3/30/2022  3:38 PM CDT -----  Lipids ok   Tsh is up needs to address with pcp.

## 2022-03-30 NOTE — PROGRESS NOTES
RHEUMATOLOGY CLINIC FOLLOW UP VISIT  Chief complaints, HPI, ROS, EXAM, Assessment & Plans:-  Violet Zhao a 82 y.o. pleasant female comes in for follow-up visit.  She follows up in the Rheumatology Clinic for osteoporosis, osteoarthritis, gout and neuropathy pain.  She denies any fractures since last visit.  Had a couple of falls.  Worsening fatigue and memory loss.   She denies any significant joint pain.  No gout flares since last visit.  Rheumatological review of system negative.  Physical examination showed no evidence of synovitis or effusion..    1. Age-related osteoporosis without current pathological fracture    2. Idiopathic chronic gout, multiple sites, without tophus (tophi)    3. Primary osteoarthritis involving multiple joints    4. Vitamin D deficiency    5. Hypothyroidism (acquired)      Problem List Items Addressed This Visit     Hypothyroidism (acquired) (Chronic)    Primary osteoarthritis involving multiple joints (Chronic)    Idiopathic chronic gout, multiple sites, without tophus (tophi)    Vitamin D deficiency    Relevant Medications    ergocalciferol (ERGOCALCIFEROL) 50,000 unit Cap    Age-related osteoporosis without current pathological fracture - Primary           Osteoporosis on Prolia with chronic kidney disease and high risk for hypocalcemia   Prolia today and repeat CMP in 10 days.   TSH in process from today.       # Follow up in about 6 months (around 9/30/2022).    Medication List with Changes/Refills   Current Medications    ALLOPURINOL (ZYLOPRIM) 300 MG TABLET    Take 1 tablet (300 mg total) by mouth once daily.    ASCORBIC ACID, VITAMIN C, (VITAMIN C) 100 MG TABLET    Take by mouth once daily.    ASPIRIN (ECOTRIN) 81 MG EC TABLET    Take 81 mg by mouth nightly.     BUSPIRONE (BUSPAR) 7.5 MG TABLET    TAKE ONE TABLET BY MOUTH THREE TIMES DAILY    CALCITRIOL (ROCALTROL) 0.25 MCG CAP    TAKE ONE CAPSULE BY  MOUTH EVERY MONDAY, WEDNESDAY AND FRIDAY    CLOPIDOGREL (PLAVIX) 75 MG TABLET    TAKE ONE TABLET BY MOUTH EVERY DAY    DICLOFENAC SODIUM 1 % GEL    Apply 2 g topically once daily. Apply 2 g over painful joints once or twice a day.    DIPRIVAN 10 MG/ML INFUSION        DULOXETINE (CYMBALTA) 30 MG CAPSULE    Take 30 mg by mouth once daily.    ELIQUIS 2.5 MG TAB    Take 1 tablet (2.5 mg total) by mouth 2 (two) times daily.    ERGOCALCIFEROL (ERGOCALCIFEROL) 50,000 UNIT CAP    Take 1 capsule (50,000 Units total) by mouth every 7 days.    FLUTICASONE PROPIONATE (FLONASE) 50 MCG/ACTUATION NASAL SPRAY    2 sprays (100 mcg total) by Each Nostril route once daily.    FUROSEMIDE (LASIX) 40 MG TABLET    Take 1 tablet (40 mg total) by mouth once daily.    GABAPENTIN (NEURONTIN) 300 MG CAPSULE    Take 1 capsule (300 mg total) by mouth every evening. Before bed    IRON-VITAMIN C 100-250 MG, ICAR-C, 100-250 MG TAB    Take 1 tablet by mouth once daily.    LEVOCETIRIZINE (XYZAL) 5 MG TABLET    Take 1 tablet (5 mg total) by mouth every evening.    LEVOTHYROXINE (SYNTHROID) 75 MCG TABLET    TAKE ONE TABLET BY MOUTH EVERY DAY BEFORE BREAKFAST    METOPROLOL TARTRATE (LOPRESSOR) 25 MG TABLET    TAKE ONE TABLET BY MOUTH TWICE DAILY    MIRTAZAPINE (REMERON SOL-TAB) 15 MG DISINTEGRATING TABLET    TAKE ONE TABLET UNDER THE TONGUE AT BEDTIME FOR INSOMNIA    MULTIVITAMIN (THERAGRAN) PER TABLET    Take 1 tablet by mouth once daily.    NITROGLYCERIN 0.2 MG/HR TD PT24 (NITRODUR) 0.2 MG/HR    Place 1 patch onto the skin once daily.    PRAVASTATIN (PRAVACHOL) 40 MG TABLET    TAKE ONE TABLET BY MOUTH ONCE DAILY    SERTRALINE (ZOLOFT) 100 MG TABLET    TAKE ONE AND ONE-HALF TABLETS (150MG TOTAL) BY MOUTH ONCE DAILY    SODIUM BICARBONATE 650 MG TABLET    Take 1 tablet (650 mg total) by mouth 2 (two) times daily.    ZINC ACETATE ORAL    Take 250 mg by mouth once daily.        Past Medical History:   Diagnosis Date    Age-related osteoporosis without  current pathological fracture 8/20/2018    Anemia     Anxiety     Arthritis     Atrial flutter     Cancer     lymphoma Large cell B    CHF (congestive heart failure)     Chronic anemia 4/26/2017    Chronic midline low back pain with right-sided sciatica 8/20/2018    Coronary artery disease     01/2015 LHC patent LCX. 50% stenosis in LAD and RCA.      Depression     Disorder of kidney and ureter     Encounter for blood transfusion     GERD (gastroesophageal reflux disease)     Gout, arthritis     Heart failure     Hx of psychiatric care     Hyperlipidemia     Hypertension     Hypothyroidism     Immune deficiency disorder     Kidney disease     Lung nodule 2014    RML--stable    Obesity     Pacemaker     Metronic    Paroxysmal atrial fibrillation 3/15/2018    Pneumonia     Polyneuropathy     chemo induced    Psychiatric problem     Stroke 11/16/2020    Tobacco dependence     quit 1976    Trouble in sleeping     Unstable angina 11/27/2020       Past Surgical History:   Procedure Laterality Date    APPENDECTOMY  1966 approx    CARDIAC PACEMAKER PLACEMENT  01/22/2015    CHOLECYSTECTOMY  1993    incidental at time of gastric bypass    COLON SURGERY Right 2017    hemicolectomy    COLONOSCOPY N/A 4/6/2017    Procedure: COLONOSCOPY;  Surgeon: Tye Enamorado MD;  Location: [facility];  Service: Endoscopy;  Laterality: N/A;    COLONOSCOPY N/A 11/28/2018    Procedure: COLONOSCOPY;  Surgeon: Saúl Arthur III, MD;  Location: [facility];  Service: Endoscopy;  Laterality: N/A;    CORONARY ANGIOPLASTY  02/2014    CORONARY STENT PLACEMENT  02/05/2014    ESOPHAGOGASTRODUODENOSCOPY N/A 11/28/2018    Procedure: EGD (ESOPHAGOGASTRODUODENOSCOPY);  Surgeon: Saúl Arthur III, MD;  Location: [facility];  Service: Endoscopy;  Laterality: N/A;    GASTRIC BYPASS  1993    with incidental choly    HERNIA REPAIR      INJECTION OF ANESTHETIC AGENT INTO SACROILIAC JOINT Right 10/8/2020    Procedure:  "Right BLOCK, SACROILIAC JOINT with RN IV sedation;  Surgeon: Madi Anton MD;  Location[facility];  Service: Pain Management;  Laterality: Right;    JOINT REPLACEMENT Bilateral     3 months apart    TONSILLECTOMY  195        Social History     Tobacco Use    Smoking status: Former Smoker     Packs/day: 2.00     Years: 6.00     Pack years: 12.00     Quit date: 3/15/1976     Years since quittin.0    Smokeless tobacco: Never Used   Substance Use Topics    Alcohol use: No     Alcohol/week: 0.0 standard drinks    Drug use: No       Family History   Problem Relation Age of Onset    Heart disease Mother     Hypertension Mother     Cataracts Mother     Stomach cancer Father         "ulcers that turned to cancer"    Cancer Father         stomach    Pancreatic cancer Sister     Cancer Sister         pancreatic    Leukemia Brother         "leukemia which led to intestinal cancer"    Cataracts Brother     Cancer Brother         leukemia then later stomach cancer    Drug abuse Son     Cancer Son         prostate cancer    Prostate cancer Son     COPD Daughter     Asthma Daughter     Hypertension Maternal Grandfather     Stroke Maternal Grandfather     Alcohol abuse Neg Hx     Diabetes Neg Hx     Mental retardation Neg Hx     Mental illness Neg Hx        Review of patient's allergies indicates:   Allergen Reactions    Corticosteroids (glucocorticoids) Nausea Only and Other (See Comments)     Stomach pain, dizziness, headache    Oxycodone Other (See Comments)     Blood pressure dropped       Disclaimer: This note was prepared using voice recognition system and is likely to have sound alike errors and is not proof read.  Please call me with any questions.  "

## 2022-04-13 ENCOUNTER — LAB VISIT (OUTPATIENT)
Dept: LAB | Facility: HOSPITAL | Age: 82
End: 2022-04-13
Attending: INTERNAL MEDICINE
Payer: MEDICARE

## 2022-04-13 DIAGNOSIS — M81.0 AGE-RELATED OSTEOPOROSIS WITHOUT CURRENT PATHOLOGICAL FRACTURE: ICD-10-CM

## 2022-04-13 DIAGNOSIS — M1A.09X0 IDIOPATHIC CHRONIC GOUT OF MULTIPLE SITES WITHOUT TOPHUS: ICD-10-CM

## 2022-04-13 LAB
ALBUMIN SERPL BCP-MCNC: 3.3 G/DL (ref 3.5–5.2)
ALP SERPL-CCNC: 67 U/L (ref 55–135)
ALT SERPL W/O P-5'-P-CCNC: 50 U/L (ref 10–44)
ANION GAP SERPL CALC-SCNC: 7 MMOL/L (ref 8–16)
AST SERPL-CCNC: 49 U/L (ref 10–40)
BILIRUB SERPL-MCNC: 0.3 MG/DL (ref 0.1–1)
BUN SERPL-MCNC: 25 MG/DL (ref 8–23)
CALCIUM SERPL-MCNC: 9.2 MG/DL (ref 8.7–10.5)
CHLORIDE SERPL-SCNC: 111 MMOL/L (ref 95–110)
CO2 SERPL-SCNC: 22 MMOL/L (ref 23–29)
CREAT SERPL-MCNC: 1.4 MG/DL (ref 0.5–1.4)
EST. GFR  (AFRICAN AMERICAN): 41 ML/MIN/1.73 M^2
EST. GFR  (NON AFRICAN AMERICAN): 35 ML/MIN/1.73 M^2
GLUCOSE SERPL-MCNC: 86 MG/DL (ref 70–110)
POTASSIUM SERPL-SCNC: 4.8 MMOL/L (ref 3.5–5.1)
PROT SERPL-MCNC: 6.9 G/DL (ref 6–8.4)
SODIUM SERPL-SCNC: 140 MMOL/L (ref 136–145)

## 2022-04-13 PROCEDURE — 80053 COMPREHEN METABOLIC PANEL: CPT | Performed by: INTERNAL MEDICINE

## 2022-04-13 PROCEDURE — 36415 COLL VENOUS BLD VENIPUNCTURE: CPT | Performed by: INTERNAL MEDICINE

## 2022-04-14 ENCOUNTER — TELEPHONE (OUTPATIENT)
Dept: CARDIOLOGY | Facility: HOSPITAL | Age: 82
End: 2022-04-14
Payer: MEDICARE

## 2022-04-14 NOTE — PLAN OF CARE
Problem: Patient Care Overview  Goal: Plan of Care Review  Outcome: Ongoing (interventions implemented as appropriate)  Pt remained afebrile throughout this shift. IV fluids administered per order. Pt remained free of falls this shift.NPO except ice chips and meds. Plan of care reviewed. Patient/daughter verbalizes understanding. Pt on a morphine PCA and family instructed not to press button. PRN benadryl administered Pt coached to turn Q2. Patient SR on monitor. Bed low, side rails up x 2, wheels locked, call light in reach. Patient instructed to call for assistance. Will continue to monitor       Suturegard Body: The suture ends were repeatedly re-tightened and re-clamped to achieve the desired tissue expansion.

## 2022-04-20 ENCOUNTER — TELEPHONE (OUTPATIENT)
Dept: CARDIOLOGY | Facility: HOSPITAL | Age: 82
End: 2022-04-20
Payer: MEDICARE

## 2022-05-31 ENCOUNTER — LAB VISIT (OUTPATIENT)
Dept: LAB | Facility: HOSPITAL | Age: 82
End: 2022-05-31
Attending: INTERNAL MEDICINE
Payer: MEDICARE

## 2022-05-31 DIAGNOSIS — C83.30 DIFFUSE LARGE B-CELL LYMPHOMA, UNSPECIFIED BODY REGION: ICD-10-CM

## 2022-05-31 DIAGNOSIS — F51.01 PRIMARY INSOMNIA: ICD-10-CM

## 2022-05-31 DIAGNOSIS — F43.22 ADJUSTMENT DISORDER WITH ANXIETY: ICD-10-CM

## 2022-05-31 LAB
ALBUMIN SERPL BCP-MCNC: 3.5 G/DL (ref 3.5–5.2)
ALP SERPL-CCNC: 74 U/L (ref 55–135)
ALT SERPL W/O P-5'-P-CCNC: 36 U/L (ref 10–44)
ANION GAP SERPL CALC-SCNC: 10 MMOL/L (ref 8–16)
AST SERPL-CCNC: 37 U/L (ref 10–40)
BASOPHILS # BLD AUTO: 0.03 K/UL (ref 0–0.2)
BASOPHILS NFR BLD: 0.4 % (ref 0–1.9)
BILIRUB SERPL-MCNC: 0.3 MG/DL (ref 0.1–1)
BUN SERPL-MCNC: 38 MG/DL (ref 8–23)
CALCIUM SERPL-MCNC: 9 MG/DL (ref 8.7–10.5)
CHLORIDE SERPL-SCNC: 114 MMOL/L (ref 95–110)
CO2 SERPL-SCNC: 18 MMOL/L (ref 23–29)
CREAT SERPL-MCNC: 1.6 MG/DL (ref 0.5–1.4)
DIFFERENTIAL METHOD: ABNORMAL
EOSINOPHIL # BLD AUTO: 0.3 K/UL (ref 0–0.5)
EOSINOPHIL NFR BLD: 4.1 % (ref 0–8)
ERYTHROCYTE [DISTWIDTH] IN BLOOD BY AUTOMATED COUNT: 16.1 % (ref 11.5–14.5)
ERYTHROCYTE [SEDIMENTATION RATE] IN BLOOD BY WESTERGREN METHOD: 35 MM/HR (ref 0–20)
EST. GFR  (AFRICAN AMERICAN): 35 ML/MIN/1.73 M^2
EST. GFR  (NON AFRICAN AMERICAN): 30 ML/MIN/1.73 M^2
FERRITIN SERPL-MCNC: 48 NG/ML (ref 20–300)
GLUCOSE SERPL-MCNC: 94 MG/DL (ref 70–110)
HCT VFR BLD AUTO: 36.8 % (ref 37–48.5)
HGB BLD-MCNC: 11.3 G/DL (ref 12–16)
IMM GRANULOCYTES # BLD AUTO: 0.03 K/UL (ref 0–0.04)
IMM GRANULOCYTES NFR BLD AUTO: 0.4 % (ref 0–0.5)
IRON SERPL-MCNC: 111 UG/DL (ref 30–160)
LDH SERPL L TO P-CCNC: 168 U/L (ref 110–260)
LYMPHOCYTES # BLD AUTO: 3.1 K/UL (ref 1–4.8)
LYMPHOCYTES NFR BLD: 44.1 % (ref 18–48)
MCH RBC QN AUTO: 32.8 PG (ref 27–31)
MCHC RBC AUTO-ENTMCNC: 30.7 G/DL (ref 32–36)
MCV RBC AUTO: 107 FL (ref 82–98)
MONOCYTES # BLD AUTO: 0.6 K/UL (ref 0.3–1)
MONOCYTES NFR BLD: 8.7 % (ref 4–15)
NEUTROPHILS # BLD AUTO: 3 K/UL (ref 1.8–7.7)
NEUTROPHILS NFR BLD: 42.3 % (ref 38–73)
NRBC BLD-RTO: 0 /100 WBC
PLATELET # BLD AUTO: 238 K/UL (ref 150–450)
PMV BLD AUTO: 10.1 FL (ref 9.2–12.9)
POTASSIUM SERPL-SCNC: 4.9 MMOL/L (ref 3.5–5.1)
PROT SERPL-MCNC: 7.1 G/DL (ref 6–8.4)
RBC # BLD AUTO: 3.44 M/UL (ref 4–5.4)
SATURATED IRON: 25 % (ref 20–50)
SODIUM SERPL-SCNC: 142 MMOL/L (ref 136–145)
TOTAL IRON BINDING CAPACITY: 450 UG/DL (ref 250–450)
TRANSFERRIN SERPL-MCNC: 304 MG/DL (ref 200–375)
WBC # BLD AUTO: 7.12 K/UL (ref 3.9–12.7)

## 2022-05-31 PROCEDURE — 83615 LACTATE (LD) (LDH) ENZYME: CPT | Performed by: INTERNAL MEDICINE

## 2022-05-31 PROCEDURE — 82728 ASSAY OF FERRITIN: CPT | Performed by: INTERNAL MEDICINE

## 2022-05-31 PROCEDURE — 85651 RBC SED RATE NONAUTOMATED: CPT | Performed by: INTERNAL MEDICINE

## 2022-05-31 PROCEDURE — 84466 ASSAY OF TRANSFERRIN: CPT | Performed by: INTERNAL MEDICINE

## 2022-05-31 PROCEDURE — 85025 COMPLETE CBC W/AUTO DIFF WBC: CPT | Performed by: INTERNAL MEDICINE

## 2022-05-31 PROCEDURE — 80053 COMPREHEN METABOLIC PANEL: CPT | Performed by: INTERNAL MEDICINE

## 2022-05-31 PROCEDURE — 36415 COLL VENOUS BLD VENIPUNCTURE: CPT | Performed by: INTERNAL MEDICINE

## 2022-06-01 ENCOUNTER — OFFICE VISIT (OUTPATIENT)
Dept: HEMATOLOGY/ONCOLOGY | Facility: CLINIC | Age: 82
End: 2022-06-01
Payer: MEDICARE

## 2022-06-01 ENCOUNTER — IMMUNIZATION (OUTPATIENT)
Dept: PRIMARY CARE CLINIC | Facility: CLINIC | Age: 82
End: 2022-06-01
Payer: MEDICARE

## 2022-06-01 VITALS
TEMPERATURE: 97 F | OXYGEN SATURATION: 96 % | DIASTOLIC BLOOD PRESSURE: 64 MMHG | BODY MASS INDEX: 24.42 KG/M2 | HEIGHT: 64 IN | WEIGHT: 143.06 LBS | HEART RATE: 84 BPM | SYSTOLIC BLOOD PRESSURE: 103 MMHG

## 2022-06-01 DIAGNOSIS — Z23 NEED FOR VACCINATION: Primary | ICD-10-CM

## 2022-06-01 DIAGNOSIS — N18.30 ANEMIA ASSOCIATED WITH STAGE 3 CHRONIC RENAL FAILURE: ICD-10-CM

## 2022-06-01 DIAGNOSIS — N18.32 STAGE 3B CHRONIC KIDNEY DISEASE: ICD-10-CM

## 2022-06-01 DIAGNOSIS — D53.9 MACROCYTIC ANEMIA: ICD-10-CM

## 2022-06-01 DIAGNOSIS — C83.30 DIFFUSE LARGE B-CELL LYMPHOMA, UNSPECIFIED BODY REGION: Primary | ICD-10-CM

## 2022-06-01 DIAGNOSIS — D63.1 ANEMIA ASSOCIATED WITH STAGE 3 CHRONIC RENAL FAILURE: ICD-10-CM

## 2022-06-01 DIAGNOSIS — E21.3 HYPERPARATHYROIDISM: ICD-10-CM

## 2022-06-01 PROCEDURE — 99499 UNLISTED E&M SERVICE: CPT | Mod: S$GLB,,, | Performed by: INTERNAL MEDICINE

## 2022-06-01 PROCEDURE — 1159F PR MEDICATION LIST DOCUMENTED IN MEDICAL RECORD: ICD-10-PCS | Mod: CPTII,S$GLB,, | Performed by: INTERNAL MEDICINE

## 2022-06-01 PROCEDURE — 99214 PR OFFICE/OUTPT VISIT, EST, LEVL IV, 30-39 MIN: ICD-10-PCS | Mod: S$GLB,,, | Performed by: INTERNAL MEDICINE

## 2022-06-01 PROCEDURE — 3288F PR FALLS RISK ASSESSMENT DOCUMENTED: ICD-10-PCS | Mod: CPTII,S$GLB,, | Performed by: INTERNAL MEDICINE

## 2022-06-01 PROCEDURE — 99214 OFFICE O/P EST MOD 30 MIN: CPT | Mod: S$GLB,,, | Performed by: INTERNAL MEDICINE

## 2022-06-01 PROCEDURE — 1100F PTFALLS ASSESS-DOCD GE2>/YR: CPT | Mod: CPTII,S$GLB,, | Performed by: INTERNAL MEDICINE

## 2022-06-01 PROCEDURE — 3078F PR MOST RECENT DIASTOLIC BLOOD PRESSURE < 80 MM HG: ICD-10-PCS | Mod: CPTII,S$GLB,, | Performed by: INTERNAL MEDICINE

## 2022-06-01 PROCEDURE — 1157F ADVNC CARE PLAN IN RCRD: CPT | Mod: CPTII,S$GLB,, | Performed by: INTERNAL MEDICINE

## 2022-06-01 PROCEDURE — 99499 RISK ADDL DX/OHS AUDIT: ICD-10-PCS | Mod: S$GLB,,, | Performed by: INTERNAL MEDICINE

## 2022-06-01 PROCEDURE — 1157F PR ADVANCE CARE PLAN OR EQUIV PRESENT IN MEDICAL RECORD: ICD-10-PCS | Mod: CPTII,S$GLB,, | Performed by: INTERNAL MEDICINE

## 2022-06-01 PROCEDURE — 3078F DIAST BP <80 MM HG: CPT | Mod: CPTII,S$GLB,, | Performed by: INTERNAL MEDICINE

## 2022-06-01 PROCEDURE — 91305 COVID-19, MRNA, LNP-S, PF, 30 MCG/0.3 ML DOSE VACCINE (PFIZER): CPT | Mod: PBBFAC | Performed by: FAMILY MEDICINE

## 2022-06-01 PROCEDURE — 1160F RVW MEDS BY RX/DR IN RCRD: CPT | Mod: CPTII,S$GLB,, | Performed by: INTERNAL MEDICINE

## 2022-06-01 PROCEDURE — 1159F MED LIST DOCD IN RCRD: CPT | Mod: CPTII,S$GLB,, | Performed by: INTERNAL MEDICINE

## 2022-06-01 PROCEDURE — 99999 PR PBB SHADOW E&M-EST. PATIENT-LVL V: CPT | Mod: PBBFAC,,, | Performed by: INTERNAL MEDICINE

## 2022-06-01 PROCEDURE — 3074F PR MOST RECENT SYSTOLIC BLOOD PRESSURE < 130 MM HG: ICD-10-PCS | Mod: CPTII,S$GLB,, | Performed by: INTERNAL MEDICINE

## 2022-06-01 PROCEDURE — 1100F PR PT FALLS ASSESS DOC 2+ FALLS/FALL W/INJURY/YR: ICD-10-PCS | Mod: CPTII,S$GLB,, | Performed by: INTERNAL MEDICINE

## 2022-06-01 PROCEDURE — 1126F PR PAIN SEVERITY QUANTIFIED, NO PAIN PRESENT: ICD-10-PCS | Mod: CPTII,S$GLB,, | Performed by: INTERNAL MEDICINE

## 2022-06-01 PROCEDURE — 3074F SYST BP LT 130 MM HG: CPT | Mod: CPTII,S$GLB,, | Performed by: INTERNAL MEDICINE

## 2022-06-01 PROCEDURE — 3288F FALL RISK ASSESSMENT DOCD: CPT | Mod: CPTII,S$GLB,, | Performed by: INTERNAL MEDICINE

## 2022-06-01 PROCEDURE — 99999 PR PBB SHADOW E&M-EST. PATIENT-LVL V: ICD-10-PCS | Mod: PBBFAC,,, | Performed by: INTERNAL MEDICINE

## 2022-06-01 PROCEDURE — 1160F PR REVIEW ALL MEDS BY PRESCRIBER/CLIN PHARMACIST DOCUMENTED: ICD-10-PCS | Mod: CPTII,S$GLB,, | Performed by: INTERNAL MEDICINE

## 2022-06-01 PROCEDURE — 1126F AMNT PAIN NOTED NONE PRSNT: CPT | Mod: CPTII,S$GLB,, | Performed by: INTERNAL MEDICINE

## 2022-06-01 NOTE — PROGRESS NOTES
"Subjective:       Patient ID: Violet Swenson is a 82 y.o. female.    Chief Complaint: Results and Lymphoma    HPI 82-year-old female status post R-CHOP chemotherapy diffuse large B-cell lymphoma 3.5 years.  Last PET scan in February 2021 no active disease ECOG status 1    Past Medical History:   Diagnosis Date    Age-related osteoporosis without current pathological fracture 8/20/2018    Anemia     Anxiety     Arthritis     Atrial flutter     Cancer     lymphoma Large cell B    CHF (congestive heart failure)     Chronic anemia 4/26/2017    Chronic midline low back pain with right-sided sciatica 8/20/2018    Coronary artery disease     01/2015 LHC patent LCX. 50% stenosis in LAD and RCA.      Depression     Disorder of kidney and ureter     Encounter for blood transfusion     GERD (gastroesophageal reflux disease)     Gout, arthritis     Heart failure     Hx of psychiatric care     Hyperlipidemia     Hypertension     Hypothyroidism     Immune deficiency disorder     Kidney disease     Lung nodule 2014    RML--stable    Obesity     Pacemaker     Metronic    Paroxysmal atrial fibrillation 3/15/2018    Pneumonia     Polyneuropathy     chemo induced    Psychiatric problem     Stroke 11/16/2020    Tobacco dependence     quit 1976    Trouble in sleeping     Unstable angina 11/27/2020     Family History   Problem Relation Age of Onset    Heart disease Mother     Hypertension Mother     Cataracts Mother     Stomach cancer Father         "ulcers that turned to cancer"    Cancer Father         stomach    Pancreatic cancer Sister     Cancer Sister         pancreatic    Leukemia Brother         "leukemia which led to intestinal cancer"    Cataracts Brother     Cancer Brother         leukemia then later stomach cancer    Drug abuse Son     Cancer Son         prostate cancer    Prostate cancer Son     COPD Daughter     Asthma Daughter     Hypertension Maternal Grandfather "     Stroke Maternal Grandfather     Alcohol abuse Neg Hx     Diabetes Neg Hx     Mental retardation Neg Hx     Mental illness Neg Hx      Social History     Socioeconomic History    Marital status:     Number of children: 4   Occupational History    Occupation: Retired   Tobacco Use    Smoking status: Former Smoker     Packs/day: 2.00     Years: 6.00     Pack years: 12.00     Quit date: 3/15/1976     Years since quittin.2    Smokeless tobacco: Never Used   Substance and Sexual Activity    Alcohol use: No     Alcohol/week: 0.0 standard drinks    Drug use: No    Sexual activity: Not Currently     Partners: Male   Social History Narrative    , lives with . She is a retired . 4 children (3 natural born, 1 adopted)- 2 ; 2x  (currently 17 years); son has prostate cancer, daughter COPD,  cardiac difficulty. She still drives. Does not have a Living will or Advanced Directive.     Social Determinants of Health     Financial Resource Strain: Low Risk     Difficulty of Paying Living Expenses: Not hard at all   Food Insecurity: No Food Insecurity    Worried About Running Out of Food in the Last Year: Never true    Ran Out of Food in the Last Year: Never true   Transportation Needs: No Transportation Needs    Lack of Transportation (Medical): No    Lack of Transportation (Non-Medical): No   Physical Activity: Inactive    Days of Exercise per Week: 0 days    Minutes of Exercise per Session: 0 min   Stress: Stress Concern Present    Feeling of Stress : To some extent   Social Connections: Moderately Integrated    Frequency of Communication with Friends and Family: Twice a week    Frequency of Social Gatherings with Friends and Family: Once a week    Attends [employer] Services: More than 4 times per year    Active Member of Clubs or Organizations: Patient refused    Attends Club or Organization Meetings: Never    Marital Status:    Housing  Stability: Low Risk     Unable to Pay for Housing in the Last Year: No    Number of Places Lived in the Last Year: 1    Unstable Housing in the Last Year: No     Past Surgical History:   Procedure Laterality Date    APPENDECTOMY  1966 approx    CARDIAC PACEMAKER PLACEMENT  01/22/2015    CHOLECYSTECTOMY  1993    incidental at time of gastric bypass    COLON SURGERY Right 2017    hemicolectomy    COLONOSCOPY N/A 4/6/2017    Procedure: COLONOSCOPY;  Surgeon: Tye Enamorado MD;  Location: [facility];  Service: Endoscopy;  Laterality: N/A;    COLONOSCOPY N/A 11/28/2018    Procedure: COLONOSCOPY;  Surgeon: Saúl Arthur III, MD;  Location: [facility];  Service: Endoscopy;  Laterality: N/A;    CORONARY ANGIOPLASTY  02/2014    CORONARY STENT PLACEMENT  02/05/2014    ESOPHAGOGASTRODUODENOSCOPY N/A 11/28/2018    Procedure: EGD (ESOPHAGOGASTRODUODENOSCOPY);  Surgeon: Saúl Arthur III, MD;  Location: [facility];  Service: Endoscopy;  Laterality: N/A;    GASTRIC BYPASS  1993    with incidental choly    HERNIA REPAIR      INJECTION OF ANESTHETIC AGENT INTO SACROILIAC JOINT Right 10/8/2020    Procedure: Right BLOCK, SACROILIAC JOINT with RN IV sedation;  Surgeon: Madi Anton MD;  Location: [facility];  Service: Pain Management;  Laterality: Right;    JOINT REPLACEMENT Bilateral 2009    3 months apart    TONSILLECTOMY  1959       Labs:  Lab Results   Component Value Date    WBC 7.12 05/31/2022    HGB 11.3 (L) 05/31/2022    HCT 36.8 (L) 05/31/2022     (H) 05/31/2022     05/31/2022     BMP  Lab Results   Component Value Date     05/31/2022    K 4.9 05/31/2022     (H) 05/31/2022    CO2 18 (L) 05/31/2022    BUN 38 (H) 05/31/2022    CREATININE 1.6 (H) 05/31/2022    CALCIUM 9.0 05/31/2022    ANIONGAP 10 05/31/2022    ESTGFRAFRICA 35 (A) 05/31/2022    EGFRNONAA 30 (A) 05/31/2022     Lab Results   Component Value Date    ALT 36 05/31/2022    AST 37 05/31/2022    ALKPHOS 74 05/31/2022     BILITOT 0.3 05/31/2022       Lab Results   Component Value Date    IRON 111 05/31/2022    TIBC 450 05/31/2022    FERRITIN 48 05/31/2022     Lab Results   Component Value Date    FSUISTVJ82 >2000 (H) 11/07/2019     Lab Results   Component Value Date    FOLATE 18.1 11/07/2019     Lab Results   Component Value Date    TSH 8.254 (H) 03/30/2022         Review of Systems   Constitutional: Positive for fatigue. Negative for activity change, appetite change, chills, diaphoresis, fever and unexpected weight change.   HENT: Negative for congestion, dental problem, drooling, ear discharge, ear pain, facial swelling, hearing loss, mouth sores, nosebleeds, postnasal drip, rhinorrhea, sinus pressure, sneezing, sore throat, tinnitus, trouble swallowing and voice change.    Eyes: Negative for photophobia, pain, discharge, redness, itching and visual disturbance.   Respiratory: Negative for cough, choking, chest tightness, shortness of breath, wheezing and stridor.    Cardiovascular: Negative for chest pain, palpitations and leg swelling.   Gastrointestinal: Negative for abdominal distention, abdominal pain, anal bleeding, blood in stool, constipation, diarrhea, nausea, rectal pain and vomiting.   Endocrine: Negative for cold intolerance, heat intolerance, polydipsia, polyphagia and polyuria.   Genitourinary: Negative for decreased urine volume, difficulty urinating, dyspareunia, dysuria, enuresis, flank pain, frequency, genital sores, hematuria, menstrual problem, pelvic pain, urgency, vaginal bleeding, vaginal discharge and vaginal pain.   Musculoskeletal: Positive for arthralgias, gait problem and myalgias. Negative for back pain, joint swelling, neck pain and neck stiffness.   Skin: Negative for color change, pallor and rash.   Allergic/Immunologic: Negative for environmental allergies, food allergies and immunocompromised state.   Neurological: Positive for weakness. Negative for dizziness, tremors, seizures, syncope, facial  asymmetry, speech difficulty, light-headedness, numbness and headaches.   Hematological: Negative for adenopathy. Does not bruise/bleed easily.   Psychiatric/Behavioral: Positive for dysphoric mood. Negative for agitation, behavioral problems, confusion, decreased concentration, hallucinations, self-injury, sleep disturbance and suicidal ideas. The patient is nervous/anxious. The patient is not hyperactive.        Objective:      Physical Exam  Vitals reviewed.   Constitutional:       General: She is not in acute distress.     Appearance: She is well-developed. She is not diaphoretic.   HENT:      Head: Normocephalic and atraumatic.      Right Ear: External ear normal.      Left Ear: External ear normal.      Nose: Nose normal.      Right Sinus: No maxillary sinus tenderness or frontal sinus tenderness.      Left Sinus: No maxillary sinus tenderness or frontal sinus tenderness.      Mouth/Throat:      Pharynx: No oropharyngeal exudate.   Eyes:      General: Lids are normal. No scleral icterus.        Right eye: No discharge.         Left eye: No discharge.      Conjunctiva/sclera: Conjunctivae normal.      Right eye: Right conjunctiva is not injected. No hemorrhage.     Left eye: Left conjunctiva is not injected. No hemorrhage.     Pupils: Pupils are equal, round, and reactive to light.   Neck:      Thyroid: No thyromegaly.      Vascular: No JVD.      Trachea: No tracheal deviation.   Cardiovascular:      Rate and Rhythm: Normal rate.   Pulmonary:      Effort: Pulmonary effort is normal. No respiratory distress.      Breath sounds: No stridor.   Chest:      Chest wall: No tenderness.   Breasts:      Right: No supraclavicular adenopathy.      Left: No supraclavicular adenopathy.       Abdominal:      General: Bowel sounds are normal. There is no distension.      Palpations: Abdomen is soft. There is no hepatomegaly, splenomegaly or mass.      Tenderness: There is no abdominal tenderness. There is no rebound.    Musculoskeletal:         General: No tenderness. Normal range of motion.      Cervical back: Normal range of motion and neck supple.   Lymphadenopathy:      Cervical: No cervical adenopathy.      Upper Body:      Right upper body: No supraclavicular adenopathy.      Left upper body: No supraclavicular adenopathy.   Skin:     General: Skin is dry.      Findings: No erythema or rash.   Neurological:      Mental Status: She is alert and oriented to person, place, and time.      Cranial Nerves: No cranial nerve deficit.      Motor: Weakness present.      Coordination: Coordination abnormal.      Gait: Gait abnormal.   Psychiatric:         Behavior: Behavior normal.         Thought Content: Thought content normal.         Judgment: Judgment normal.             Assessment:      1. Diffuse large B-cell lymphoma, unspecified body region    2. Macrocytic anemia    3. Hyperparathyroidism    4. Stage 3b chronic kidney disease    5. Anemia associated with stage 3 chronic renal failure           Plan:     History of diffuse large B-cell lymphoma no active disease at present time continue follow-up 9 months slight elevation sed rate reassurance given she has involving right on discussed implications answered questions follow-up as detailed talked about the lack of efficacy of routine imaging studies she is comfortable with not proceeding but if she does become concern will be happy to do so        Ken Armando Jr, MD FACP

## 2022-07-18 ENCOUNTER — PES CALL (OUTPATIENT)
Dept: ADMINISTRATIVE | Facility: CLINIC | Age: 82
End: 2022-07-18
Payer: MEDICARE

## 2022-07-22 DIAGNOSIS — E03.9 HYPOTHYROIDISM (ACQUIRED): Chronic | ICD-10-CM

## 2022-07-22 RX ORDER — LEVOTHYROXINE SODIUM 75 UG/1
TABLET ORAL
Qty: 90 TABLET | Refills: 1 | Status: SHIPPED | OUTPATIENT
Start: 2022-07-22 | End: 2022-10-26

## 2022-07-29 DIAGNOSIS — E87.20 ACIDOSIS: ICD-10-CM

## 2022-07-29 RX ORDER — SODIUM BICARBONATE 650 MG/1
650 TABLET ORAL 2 TIMES DAILY
Qty: 60 TABLET | Refills: 11 | Status: SHIPPED | OUTPATIENT
Start: 2022-07-29 | End: 2023-06-26

## 2022-08-06 RX ORDER — CALCITRIOL 0.25 UG/1
CAPSULE ORAL
Qty: 12 CAPSULE | Refills: 5 | Status: SHIPPED | OUTPATIENT
Start: 2022-08-06 | End: 2023-03-29 | Stop reason: SDUPTHER

## 2022-08-12 ENCOUNTER — PES CALL (OUTPATIENT)
Dept: ADMINISTRATIVE | Facility: CLINIC | Age: 82
End: 2022-08-12
Payer: MEDICARE

## 2022-08-24 DIAGNOSIS — Z78.0 MENOPAUSE: ICD-10-CM

## 2022-09-15 ENCOUNTER — TELEPHONE (OUTPATIENT)
Dept: CARDIOLOGY | Facility: HOSPITAL | Age: 82
End: 2022-09-15
Payer: MEDICARE

## 2022-09-15 NOTE — TELEPHONE ENCOUNTER
I have attempted without success to contact this patient by phone to reschedule appts with Dr. Gil and device clinic.  Pt was scheduled for both at [facility] on 9/8 but no showed.  No answer, left message requesting callback to [phone].  Advised that we can do device check on 10/11 at [place] along with her existing rheumatology appts.

## 2022-09-23 ENCOUNTER — TELEPHONE (OUTPATIENT)
Dept: ADMINISTRATIVE | Facility: HOSPITAL | Age: 82
End: 2022-09-23
Payer: MEDICARE

## 2022-10-11 ENCOUNTER — OFFICE VISIT (OUTPATIENT)
Dept: RHEUMATOLOGY | Facility: CLINIC | Age: 82
End: 2022-10-11
Payer: MEDICARE

## 2022-10-11 ENCOUNTER — INFUSION (OUTPATIENT)
Dept: INFUSION THERAPY | Facility: HOSPITAL | Age: 82
End: 2022-10-11
Attending: INTERNAL MEDICINE
Payer: MEDICARE

## 2022-10-11 VITALS
HEART RATE: 94 BPM | TEMPERATURE: 97 F | SYSTOLIC BLOOD PRESSURE: 129 MMHG | RESPIRATION RATE: 18 BRPM | OXYGEN SATURATION: 94 % | DIASTOLIC BLOOD PRESSURE: 81 MMHG

## 2022-10-11 VITALS
SYSTOLIC BLOOD PRESSURE: 119 MMHG | HEART RATE: 83 BPM | HEIGHT: 64 IN | WEIGHT: 142 LBS | DIASTOLIC BLOOD PRESSURE: 76 MMHG | BODY MASS INDEX: 24.24 KG/M2

## 2022-10-11 DIAGNOSIS — M79.641 PAIN OF RIGHT HAND: ICD-10-CM

## 2022-10-11 DIAGNOSIS — M1A.09X0 IDIOPATHIC CHRONIC GOUT, MULTIPLE SITES, WITHOUT TOPHUS (TOPHI): ICD-10-CM

## 2022-10-11 DIAGNOSIS — G62.9 NEUROPATHY: ICD-10-CM

## 2022-10-11 DIAGNOSIS — M81.0 AGE-RELATED OSTEOPOROSIS WITHOUT CURRENT PATHOLOGICAL FRACTURE: Primary | ICD-10-CM

## 2022-10-11 PROCEDURE — 1160F PR REVIEW ALL MEDS BY PRESCRIBER/CLIN PHARMACIST DOCUMENTED: ICD-10-PCS | Mod: CPTII,S$GLB,, | Performed by: INTERNAL MEDICINE

## 2022-10-11 PROCEDURE — 1159F MED LIST DOCD IN RCRD: CPT | Mod: CPTII,S$GLB,, | Performed by: INTERNAL MEDICINE

## 2022-10-11 PROCEDURE — 3078F DIAST BP <80 MM HG: CPT | Mod: CPTII,S$GLB,, | Performed by: INTERNAL MEDICINE

## 2022-10-11 PROCEDURE — 3288F FALL RISK ASSESSMENT DOCD: CPT | Mod: CPTII,S$GLB,, | Performed by: INTERNAL MEDICINE

## 2022-10-11 PROCEDURE — 3078F PR MOST RECENT DIASTOLIC BLOOD PRESSURE < 80 MM HG: ICD-10-PCS | Mod: CPTII,S$GLB,, | Performed by: INTERNAL MEDICINE

## 2022-10-11 PROCEDURE — 1125F PR PAIN SEVERITY QUANTIFIED, PAIN PRESENT: ICD-10-PCS | Mod: CPTII,S$GLB,, | Performed by: INTERNAL MEDICINE

## 2022-10-11 PROCEDURE — 3288F PR FALLS RISK ASSESSMENT DOCUMENTED: ICD-10-PCS | Mod: CPTII,S$GLB,, | Performed by: INTERNAL MEDICINE

## 2022-10-11 PROCEDURE — 1159F PR MEDICATION LIST DOCUMENTED IN MEDICAL RECORD: ICD-10-PCS | Mod: CPTII,S$GLB,, | Performed by: INTERNAL MEDICINE

## 2022-10-11 PROCEDURE — 1157F ADVNC CARE PLAN IN RCRD: CPT | Mod: CPTII,S$GLB,, | Performed by: INTERNAL MEDICINE

## 2022-10-11 PROCEDURE — 1160F RVW MEDS BY RX/DR IN RCRD: CPT | Mod: CPTII,S$GLB,, | Performed by: INTERNAL MEDICINE

## 2022-10-11 PROCEDURE — 99999 PR PBB SHADOW E&M-EST. PATIENT-LVL IV: ICD-10-PCS | Mod: PBBFAC,,, | Performed by: INTERNAL MEDICINE

## 2022-10-11 PROCEDURE — 96372 THER/PROPH/DIAG INJ SC/IM: CPT

## 2022-10-11 PROCEDURE — 99999 PR PBB SHADOW E&M-EST. PATIENT-LVL IV: CPT | Mod: PBBFAC,,, | Performed by: INTERNAL MEDICINE

## 2022-10-11 PROCEDURE — 3074F SYST BP LT 130 MM HG: CPT | Mod: CPTII,S$GLB,, | Performed by: INTERNAL MEDICINE

## 2022-10-11 PROCEDURE — 1157F PR ADVANCE CARE PLAN OR EQUIV PRESENT IN MEDICAL RECORD: ICD-10-PCS | Mod: CPTII,S$GLB,, | Performed by: INTERNAL MEDICINE

## 2022-10-11 PROCEDURE — 99214 OFFICE O/P EST MOD 30 MIN: CPT | Mod: S$GLB,,, | Performed by: INTERNAL MEDICINE

## 2022-10-11 PROCEDURE — 99499 UNLISTED E&M SERVICE: CPT | Mod: HCNC,S$GLB,, | Performed by: INTERNAL MEDICINE

## 2022-10-11 PROCEDURE — 1125F AMNT PAIN NOTED PAIN PRSNT: CPT | Mod: CPTII,S$GLB,, | Performed by: INTERNAL MEDICINE

## 2022-10-11 PROCEDURE — 63600175 PHARM REV CODE 636 W HCPCS: Mod: JG | Performed by: INTERNAL MEDICINE

## 2022-10-11 PROCEDURE — 1100F PTFALLS ASSESS-DOCD GE2>/YR: CPT | Mod: CPTII,S$GLB,, | Performed by: INTERNAL MEDICINE

## 2022-10-11 PROCEDURE — 3074F PR MOST RECENT SYSTOLIC BLOOD PRESSURE < 130 MM HG: ICD-10-PCS | Mod: CPTII,S$GLB,, | Performed by: INTERNAL MEDICINE

## 2022-10-11 PROCEDURE — 1100F PR PT FALLS ASSESS DOC 2+ FALLS/FALL W/INJURY/YR: ICD-10-PCS | Mod: CPTII,S$GLB,, | Performed by: INTERNAL MEDICINE

## 2022-10-11 PROCEDURE — 99499 RISK ADDL DX/OHS AUDIT: ICD-10-PCS | Mod: HCNC,S$GLB,, | Performed by: INTERNAL MEDICINE

## 2022-10-11 PROCEDURE — 99214 PR OFFICE/OUTPT VISIT, EST, LEVL IV, 30-39 MIN: ICD-10-PCS | Mod: S$GLB,,, | Performed by: INTERNAL MEDICINE

## 2022-10-11 RX ORDER — PREGABALIN 50 MG/1
50 CAPSULE ORAL 3 TIMES DAILY
Qty: 90 CAPSULE | Refills: 6 | Status: SHIPPED | OUTPATIENT
Start: 2022-10-11 | End: 2023-04-03

## 2022-10-11 RX ADMIN — DENOSUMAB 60 MG: 60 INJECTION SUBCUTANEOUS at 11:10

## 2022-10-11 NOTE — PROGRESS NOTES
RHEUMATOLOGY CLINIC FOLLOW UP VISIT  Chief complaints, HPI, ROS, EXAM, Assessment & Plans:-  Violet Zhao a 82 y.o. pleasant female comes in for follow-up visit.  She follows up in the Rheumatology Clinic for osteoporosis, osteoarthritis, gout and neuropathy pain.  She denies any fractures since last visit.  Had a couple of falls.  Worsening joint pain right hand associated with swelling and redness around PIP joints. No gout flares since last visit.  Rheumatological review of system negative.  Physical examination showed TENDERNESS MORE THAN SYNOVITIS OF MULTIPLE MCP's PIP's and DIP of right hand.   1. Age-related osteoporosis without current pathological fracture    2. Idiopathic chronic gout, multiple sites, without tophus (tophi)    3. Neuropathy    4. Pain of right hand      Problem List Items Addressed This Visit       Idiopathic chronic gout, multiple sites, without tophus (tophi)    Age-related osteoporosis without current pathological fracture - Primary    Neuropathy    Relevant Medications    pregabalin (LYRICA) 50 MG capsule    Pain of right hand    Relevant Orders    C-Reactive Protein    Sedimentation rate    Rheumatoid Factor    Cyclic Citrullinated Peptide Antibody, IgG    MARÍA ELENA Screen w/Reflex       Osteoporosis on Prolia with chronic kidney disease and high risk for hypocalcemia  Prolia today and repeat CMP in 10 days.  Check serologies for RA/Lupus.   Switch gabapentin to lyrica for neuropathy pain.       # Follow up in about 6 months (around 4/11/2023).    Medication List with Changes/Refills   New Medications    PREGABALIN (LYRICA) 50 MG CAPSULE    Take 1 capsule (50 mg total) by mouth 3 (three) times daily.   Current Medications    ALLOPURINOL (ZYLOPRIM) 300 MG TABLET    Take 1 tablet (300 mg total) by mouth once daily.    ASCORBIC ACID, VITAMIN C, (VITAMIN C) 100 MG TABLET    Take by mouth once daily.    ASPIRIN (ECOTRIN) 81  MG EC TABLET    Take 81 mg by mouth nightly.     BUSPIRONE (BUSPAR) 7.5 MG TABLET    TAKE ONE TABLET BY MOUTH THREE TIMES DAILY    CALCITRIOL (ROCALTROL) 0.25 MCG CAP    TAKE ONE CAPSULE BY MOUTH EVERY MONDAY, WEDNESDAY AND FRIDAY    CLOPIDOGREL (PLAVIX) 75 MG TABLET    TAKE ONE TABLET BY MOUTH EVERY DAY    DICLOFENAC SODIUM 1 % GEL    Apply 2 g topically once daily. Apply 2 g over painful joints once or twice a day.    DIPRIVAN 10 MG/ML INFUSION        DULOXETINE (CYMBALTA) 30 MG CAPSULE    TAKE ONE CAPSULE BY MOUTH EVERY DAY    ELIQUIS 2.5 MG TAB    Take 1 tablet (2.5 mg total) by mouth 2 (two) times daily.    ERGOCALCIFEROL (ERGOCALCIFEROL) 50,000 UNIT CAP    Take 1 capsule (50,000 Units total) by mouth every 7 days.    FLUTICASONE PROPIONATE (FLONASE) 50 MCG/ACTUATION NASAL SPRAY    2 sprays (100 mcg total) by Each Nostril route once daily.    FUROSEMIDE (LASIX) 40 MG TABLET    Take 1 tablet (40 mg total) by mouth once daily.    IRON-VITAMIN C 100-250 MG, ICAR-C, 100-250 MG TAB    TAKE ONE TABLET BY MOUTH EVERY DAY    LEVOCETIRIZINE (XYZAL) 5 MG TABLET    Take 1 tablet (5 mg total) by mouth every evening.    LEVOTHYROXINE (SYNTHROID) 75 MCG TABLET    TAKE ONE TABLET BY MOUTH EVERY DAY BEFORE BREAKFAST    METOPROLOL TARTRATE (LOPRESSOR) 25 MG TABLET    TAKE ONE TABLET BY MOUTH TWICE DAILY    MIRTAZAPINE (REMERON SOL-TAB) 15 MG DISINTEGRATING TABLET    TAKE ONE TABLET UNDER THE TONGUE AT BEDTIME FOR INSOMNIA    MULTIVITAMIN (THERAGRAN) PER TABLET    Take 1 tablet by mouth once daily.    NITROGLYCERIN 0.2 MG/HR TD PT24 (NITRODUR) 0.2 MG/HR    Place 1 patch onto the skin once daily.    PRAVASTATIN (PRAVACHOL) 40 MG TABLET    TAKE ONE TABLET BY MOUTH ONCE DAILY    SERTRALINE (ZOLOFT) 100 MG TABLET    TAKE ONE AND ONE-HALF TABLETS (150MG TOTAL) BY MOUTH ONCE DAILY    SODIUM BICARBONATE 650 MG TABLET    Take 1 tablet (650 mg total) by mouth 2 (two) times daily.    ZINC ACETATE ORAL    Take 250 mg by mouth once daily.     Discontinued Medications    GABAPENTIN (NEURONTIN) 300 MG CAPSULE    Take 1 capsule (300 mg total) by mouth every evening. Before bed       Past Medical History:   Diagnosis Date    Age-related osteoporosis without current pathological fracture 8/20/2018    Anemia     Anxiety     Arthritis     Atrial flutter     Cancer     lymphoma Large cell B    CHF (congestive heart failure)     Chronic anemia 4/26/2017    Chronic midline low back pain with right-sided sciatica 8/20/2018    Coronary artery disease     01/2015 LHC patent LCX. 50% stenosis in LAD and RCA.      Depression     Disorder of kidney and ureter     Encounter for blood transfusion     GERD (gastroesophageal reflux disease)     Gout, arthritis     Heart failure     Hx of psychiatric care     Hyperlipidemia     Hypertension     Hypothyroidism     Immune deficiency disorder     Kidney disease     Lung nodule 2014    RML--stable    Obesity     Pacemaker     Metronic    Paroxysmal atrial fibrillation 3/15/2018    Pneumonia     Polyneuropathy     chemo induced    Psychiatric problem     Stroke 11/16/2020    Tobacco dependence     quit 1976    Trouble in sleeping     Unstable angina 11/27/2020       Past Surgical History:   Procedure Laterality Date    APPENDECTOMY  1966 approx    CARDIAC PACEMAKER PLACEMENT  01/22/2015    CHOLECYSTECTOMY  1993    incidental at time of gastric bypass    COLON SURGERY Right 2017    hemicolectomy    COLONOSCOPY N/A 4/6/2017    Procedure: COLONOSCOPY;  Surgeon: Tye Enamorado MD;  Location: [facility];  Service: Endoscopy;  Laterality: N/A;    COLONOSCOPY N/A 11/28/2018    Procedure: COLONOSCOPY;  Surgeon: Saúl Arthur III, MD;  Location: [facility];  Service: Endoscopy;  Laterality: N/A;    CORONARY ANGIOPLASTY  02/2014    CORONARY STENT PLACEMENT  02/05/2014    ESOPHAGOGASTRODUODENOSCOPY N/A 11/28/2018    Procedure: EGD (ESOPHAGOGASTRODUODENOSCOPY);  Surgeon: Saúl Arthur III, MD;  Location: [facility];  Service: Endoscopy;  " Laterality: N/A;    GASTRIC BYPASS      with incidental choly    HERNIA REPAIR      INJECTION OF ANESTHETIC AGENT INTO SACROILIAC JOINT Right 10/8/2020    Procedure: Right BLOCK, SACROILIAC JOINT with RN IV sedation;  Surgeon: Madi Anton MD;  Location: [facility];  Service: Pain Management;  Laterality: Right;    JOINT REPLACEMENT Bilateral     3 months apart    TONSILLECTOMY  195        Social History     Tobacco Use    Smoking status: Former     Packs/day: 2.00     Years: 6.00     Pack years: 12.00     Types: Cigarettes     Quit date: 3/15/1976     Years since quittin.6    Smokeless tobacco: Never   Substance Use Topics    Alcohol use: No     Alcohol/week: 0.0 standard drinks    Drug use: No       Family History   Problem Relation Age of Onset    Heart disease Mother     Hypertension Mother     Cataracts Mother     Stomach cancer Father         "ulcers that turned to cancer"    Cancer Father         stomach    Pancreatic cancer Sister     Cancer Sister         pancreatic    Leukemia Brother         "leukemia which led to intestinal cancer"    Cataracts Brother     Cancer Brother         leukemia then later stomach cancer    Drug abuse Son     Cancer Son         prostate cancer    Prostate cancer Son     COPD Daughter     Asthma Daughter     Hypertension Maternal Grandfather     Stroke Maternal Grandfather     Alcohol abuse Neg Hx     Diabetes Neg Hx     Mental retardation Neg Hx     Mental illness Neg Hx        Review of patient's allergies indicates:   Allergen Reactions    Corticosteroids (glucocorticoids) Nausea Only and Other (See Comments)     Stomach pain, dizziness, headache    Oxycodone Other (See Comments)     Blood pressure dropped       Disclaimer: This note was prepared using voice recognition system and is likely to have sound alike errors and is not proof read.  Please call me with any questions.    "

## 2022-10-12 ENCOUNTER — TELEPHONE (OUTPATIENT)
Dept: CARDIOLOGY | Facility: HOSPITAL | Age: 82
End: 2022-10-12
Payer: MEDICARE

## 2022-10-12 NOTE — TELEPHONE ENCOUNTER
Called pt and left VM making her aware of PPM home monitor order that will arrive in 7-10 business days per Dr. Gil's request.      ----- Message from Nader Gil MD sent at 10/12/2022  5:18 AM CDT -----  Regarding: RE:  I think will need home monitor  ----- Message -----  From: Petar Cruz RN  Sent: 10/11/2022   2:54 PM CDT  To: Nader Gil MD    Hi Dr. Gil,    I interrogated this pt's PPM today in clinic. She didn't remember she had a hx of AF, but I do see it in her chart and she confirmed she takes eliquis. She said she would get a home monitor if you thought she needed one. It doesn't appear that she has uncontrolled AF. Only two episodes since last interrogation 6mos ago. Longest 20hrs. If you think she needs a home monitor, I can see if her device is compatible with one.     Thanks,  Petar Cruz

## 2022-10-17 ENCOUNTER — TELEPHONE (OUTPATIENT)
Dept: RHEUMATOLOGY | Facility: CLINIC | Age: 82
End: 2022-10-17
Payer: MEDICARE

## 2022-10-17 NOTE — TELEPHONE ENCOUNTER
"Left message for patient to return call:      "Your liver enzymes were abnormal.  I see that you are on Pravachol for cholesterol.  It might cause liver enzyme abnormalities.  Please discuss with your primary care physician.  Dr. STORY"  "

## 2022-10-24 ENCOUNTER — LAB VISIT (OUTPATIENT)
Dept: LAB | Facility: HOSPITAL | Age: 82
End: 2022-10-24
Attending: INTERNAL MEDICINE
Payer: MEDICARE

## 2022-10-24 DIAGNOSIS — M79.641 PAIN OF RIGHT HAND: ICD-10-CM

## 2022-10-24 LAB
CRP SERPL-MCNC: 0.9 MG/L (ref 0–8.2)
ERYTHROCYTE [SEDIMENTATION RATE] IN BLOOD BY WESTERGREN METHOD: 23 MM/HR (ref 0–20)
RHEUMATOID FACT SERPL-ACNC: 18 IU/ML (ref 0–15)

## 2022-10-24 PROCEDURE — 86200 CCP ANTIBODY: CPT | Performed by: INTERNAL MEDICINE

## 2022-10-24 PROCEDURE — 86235 NUCLEAR ANTIGEN ANTIBODY: CPT | Mod: 59 | Performed by: INTERNAL MEDICINE

## 2022-10-24 PROCEDURE — 85651 RBC SED RATE NONAUTOMATED: CPT | Performed by: INTERNAL MEDICINE

## 2022-10-24 PROCEDURE — 86140 C-REACTIVE PROTEIN: CPT | Performed by: INTERNAL MEDICINE

## 2022-10-24 PROCEDURE — 86038 ANTINUCLEAR ANTIBODIES: CPT | Performed by: INTERNAL MEDICINE

## 2022-10-24 PROCEDURE — 36415 COLL VENOUS BLD VENIPUNCTURE: CPT | Performed by: INTERNAL MEDICINE

## 2022-10-24 PROCEDURE — 86039 ANTINUCLEAR ANTIBODIES (ANA): CPT | Performed by: INTERNAL MEDICINE

## 2022-10-24 PROCEDURE — 86431 RHEUMATOID FACTOR QUANT: CPT | Performed by: INTERNAL MEDICINE

## 2022-10-25 LAB
ANA PATTERN 1: NORMAL
ANA SER QL IF: POSITIVE
ANA TITR SER IF: NORMAL {TITER}
CCP AB SER IA-ACNC: <0.5 U/ML

## 2022-10-26 ENCOUNTER — PATIENT MESSAGE (OUTPATIENT)
Dept: RHEUMATOLOGY | Facility: CLINIC | Age: 82
End: 2022-10-26
Payer: MEDICARE

## 2022-10-26 DIAGNOSIS — M06.4 UNDIFFERENTIATED INFLAMMATORY ARTHRITIS: Primary | ICD-10-CM

## 2022-10-26 LAB
ANTI SM ANTIBODY: 0.05 RATIO (ref 0–0.99)
ANTI SM/RNP ANTIBODY: 0.04 RATIO (ref 0–0.99)
ANTI-SM INTERPRETATION: NEGATIVE
ANTI-SM/RNP INTERPRETATION: NEGATIVE
ANTI-SSA ANTIBODY: 0.04 RATIO (ref 0–0.99)
ANTI-SSA INTERPRETATION: NEGATIVE
ANTI-SSB ANTIBODY: 0.04 RATIO (ref 0–0.99)
ANTI-SSB INTERPRETATION: NEGATIVE
DSDNA AB SER-ACNC: NORMAL [IU]/ML

## 2022-10-26 RX ORDER — HYDROXYCHLOROQUINE SULFATE 200 MG/1
200 TABLET, FILM COATED ORAL DAILY
Qty: 90 TABLET | Refills: 3 | Status: SHIPPED | OUTPATIENT
Start: 2022-10-26 | End: 2023-10-16

## 2022-10-26 NOTE — TELEPHONE ENCOUNTER
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  Try low-dose Plaquenil.

## 2022-11-19 ENCOUNTER — HOSPITAL ENCOUNTER (EMERGENCY)
Facility: HOSPITAL | Age: 82
Discharge: HOME | End: 2022-11-19
Attending: EMERGENCY MEDICINE
Payer: MEDICARE

## 2022-11-19 VITALS
WEIGHT: 157.44 LBS | TEMPERATURE: 98 F | BODY MASS INDEX: 27.02 KG/M2 | DIASTOLIC BLOOD PRESSURE: 89 MMHG | SYSTOLIC BLOOD PRESSURE: 156 MMHG | HEART RATE: 71 BPM | RESPIRATION RATE: 16 BRPM | OXYGEN SATURATION: 100 %

## 2022-11-19 DIAGNOSIS — S63.502A SPRAIN OF LEFT WRIST, INITIAL ENCOUNTER: Primary | ICD-10-CM

## 2022-11-19 DIAGNOSIS — M25.539 WRIST PAIN: ICD-10-CM

## 2022-11-19 DIAGNOSIS — S60.212A CONTUSION OF LEFT WRIST, INITIAL ENCOUNTER: ICD-10-CM

## 2022-11-19 PROCEDURE — 29125 APPL SHORT ARM SPLINT STATIC: CPT | Mod: LT

## 2022-11-19 PROCEDURE — 99283 EMERGENCY DEPT VISIT LOW MDM: CPT | Mod: 25

## 2022-11-19 RX ORDER — TRAMADOL HYDROCHLORIDE 50 MG/1
50 TABLET ORAL EVERY 8 HOURS PRN
Qty: 12 TABLET | Refills: 0 | Status: SHIPPED | OUTPATIENT
Start: 2022-11-19 | End: 2023-04-20

## 2022-11-19 NOTE — ED PROVIDER NOTES
Encounter Date: 11/19/2022       History     Chief Complaint   Patient presents with    Wrist Injury     Patient had a ground level fall last Sunday. Pt hit head but denies any LOC, currently on blood thinners. Pt complaining of left wrist pain, obviously swelling noted.      82-year-old female with complaint of left wrist pain for the past 6 days.  Patient reports she fell last Sunday and struck her left hand and wrist on a piece of furniture.      Review of patient's allergies indicates:   Allergen Reactions    Corticosteroids (glucocorticoids) Nausea Only and Other (See Comments)     Stomach pain, dizziness, headache    Oxycodone Other (See Comments)     Blood pressure dropped     Past Medical History:   Diagnosis Date    Age-related osteoporosis without current pathological fracture 8/20/2018    Anemia     Anxiety     Arthritis     Atrial flutter     Cancer     lymphoma Large cell B    CHF (congestive heart failure)     Chronic anemia 4/26/2017    Chronic midline low back pain with right-sided sciatica 8/20/2018    Coronary artery disease     01/2015 LHC patent LCX. 50% stenosis in LAD and RCA.      Depression     Disorder of kidney and ureter     Encounter for blood transfusion     GERD (gastroesophageal reflux disease)     Gout, arthritis     Heart failure     Hx of psychiatric care     Hyperlipidemia     Hypertension     Hypothyroidism     Immune deficiency disorder     Kidney disease     Lung nodule 2014    RML--stable    Obesity     Pacemaker     Metronic    Paroxysmal atrial fibrillation 3/15/2018    Pneumonia     Polyneuropathy     chemo induced    Psychiatric problem     Stroke 11/16/2020    Tobacco dependence     quit 1976    Trouble in sleeping     Unstable angina 11/27/2020     Past Surgical History:   Procedure Laterality Date    APPENDECTOMY  1966 approx    CARDIAC PACEMAKER PLACEMENT  01/22/2015    CHOLECYSTECTOMY  1993    incidental at time of gastric bypass    COLON SURGERY Right 2017     "hemicolectomy    COLONOSCOPY N/A 2017    Procedure: COLONOSCOPY;  Surgeon: Tye Enamorado MD;  Location: [facility];  Service: Endoscopy;  Laterality: N/A;    COLONOSCOPY N/A 2018    Procedure: COLONOSCOPY;  Surgeon: Saúl Arthur III, MD;  Location: [facility] END[facility];  Service: Endoscopy;  Laterality: N/A;    CORONARY ANGIOPLASTY  2014    CORONARY STENT PLACEMENT  2014    ESOPHAGOGASTRODUODENOSCOPY N/A 2018    Procedure: EGD (ESOPHAGOGASTRODUODENOSCOPY);  Surgeon: Saúl Arthur III, MD;  Location: [facility] END[facility];  Service: Endoscopy;  Laterality: N/A;    GASTRIC BYPASS      with incidental choly    HERNIA REPAIR      INJECTION OF ANESTHETIC AGENT INTO SACROILIAC JOINT Right 10/8/2020    Procedure: Right BLOCK, SACROILIAC JOINT with RN IV sedation;  Surgeon: Madi Anton MD;  Location: [facility];  Service: Pain Management;  Laterality: Right;    JOINT REPLACEMENT Bilateral     3 months apart    TONSILLECTOMY       Family History   Problem Relation Age of Onset    Heart disease Mother     Hypertension Mother     Cataracts Mother     Stomach cancer Father         "ulcers that turned to cancer"    Cancer Father         stomach    Pancreatic cancer Sister     Cancer Sister         pancreatic    Leukemia Brother         "leukemia which led to intestinal cancer"    Cataracts Brother     Cancer Brother         leukemia then later stomach cancer    Drug abuse Son     Cancer Son         prostate cancer    Prostate cancer Son     COPD Daughter     Asthma Daughter     Hypertension Maternal Grandfather     Stroke Maternal Grandfather     Alcohol abuse Neg Hx     Diabetes Neg Hx     Mental retardation Neg Hx     Mental illness Neg Hx      Social History     Tobacco Use    Smoking status: Former     Packs/day: 2.00     Years: 6.00     Pack years: 12.00     Types: Cigarettes     Quit date: 3/15/1976     Years since quittin.7    Smokeless tobacco: Never   Substance Use Topics    Alcohol use: No "     Alcohol/week: 0.0 standard drinks    Drug use: No     Review of Systems   Constitutional:  Negative for fever.   HENT:  Negative for sore throat.    Respiratory:  Negative for shortness of breath.    Cardiovascular:  Negative for chest pain.   Gastrointestinal:  Negative for nausea.   Genitourinary:  Negative for dysuria.   Musculoskeletal:  Negative for back pain.        Left hand pain    Skin:  Negative for rash.   Neurological:  Negative for weakness.   Hematological:  Does not bruise/bleed easily.     Physical Exam     Initial Vitals [11/19/22 1016]   BP Pulse Resp Temp SpO2   (!) 156/89 71 16 97.7 °F (36.5 °C) 100 %      MAP       --         Physical Exam    Nursing note and vitals reviewed.  Constitutional: She appears well-developed and well-nourished.   HENT:   Head: Normocephalic and atraumatic.   Eyes: Conjunctivae and EOM are normal. Pupils are equal, round, and reactive to light.   Neck: Neck supple.   Normal range of motion.  Cardiovascular:  Normal rate, regular rhythm, normal heart sounds and intact distal pulses.           Pulmonary/Chest: Breath sounds normal.   Abdominal: Abdomen is soft. There is no abdominal tenderness. There is no rebound and no guarding.   Musculoskeletal:         General: Normal range of motion.      Cervical back: Normal range of motion and neck supple.      Comments: Tenderness and swelling distal left wrist, 2+ left radial pulse, pain with ROM of left wrist,      Neurological: She is alert and oriented to person, place, and time. She has normal strength and normal reflexes.   Skin: Skin is warm and dry.   Psychiatric: She has a normal mood and affect. Her behavior is normal. Thought content normal.       ED Course   Procedures  Labs Reviewed   HIV 1 / 2 ANTIBODY   HEPATITIS C ANTIBODY   HEP C VIRUS HOLD SPECIMEN          Imaging Results              X-Ray Wrist Complete Left (Final result)  Result time 11/19/22 11:19:20      Final result by Moe Guevara MD (11/19/22  11:19:20)                   Impression:      See above.      Electronically signed by: Moe Guevara MD  Date:    11/19/2022  Time:    11:19               Narrative:    EXAMINATION:  XR WRIST COMPLETE 3 VIEWS LEFT    CLINICAL HISTORY:  Pain in unspecified wrist    FINDINGS:  The bones are osteopenic.  There is no fracture or dislocation.    There is a pattern of generalized soft tissue swelling.    Severe 1st CMC osteoarthritis sequela with marked joint space narrowing, subarticular sclerosis, moderate spurring, and tiny cyst-like lucencies.    Minimal chondrocalcinosis is noted.    The remaining joint spaces are well preserved.                                       Medications - No data to display      12:26 PM  Velcro wrist splint applied to left wrist: alignment good, neurovascular status intact                     Clinical Impression:   Final diagnoses:  [M25.539] Wrist pain  [S63.502A] Sprain of left wrist, initial encounter (Primary)  [S60.212A] Contusion of left wrist, initial encounter        ED Disposition Condition    Discharge Stable          ED Prescriptions       Medication Sig Dispense Start Date End Date Auth. Provider    traMADoL (ULTRAM) 50 mg tablet Take 1 tablet (50 mg total) by mouth every 8 (eight) hours as needed for Pain. 12 tablet 11/19/2022 -- Aquiles Park NP          Follow-up Information       Follow up With Specialties Details Why Contact Info    Marti Coronel MD Family Medicine Schedule an appointment as soon as possible for a visit in 2 days  [address] [place] [address] [address] [id number]  [phone]               Aquiles Park NP  11/19/22 0671     Star Wedge Flap Text: The defect edges were debeveled with a #15c scalpel blade.  Given the location of the defect, shape of the defect and the proximity to free margins a star wedge flap was deemed most appropriate.  Using a sterile surgical marker, an appropriate rotation flap was drawn incorporating the defect and placing the expected incisions within the relaxed skin tension lines where possible. The area thus outlined was incised deep to adipose tissue with a #15 scalpel blade.  The skin margins were undermined to an appropriate distance in all directions utilizing iris scissors.

## 2022-11-19 NOTE — FIRST PROVIDER EVALUATION
Medical screening examination initiated.  I have conducted a focused provider triage encounter, findings are as follows:    Brief history of present illness:  82-year-old female with complaint left wrist pain and swelling for the past week after trip and fall.    Vitals:    11/19/22 1016   BP: (!) 156/89   BP Location: Right arm   Patient Position: Sitting   Pulse: 71   Resp: 16   Temp: 97.7 °F (36.5 °C)   TempSrc: Oral   SpO2: 100%   Weight: 71.4 kg (157 lb 6.5 oz)       Pertinent physical exam:  swelling and tenderness left distal dorsal wrist

## 2022-11-23 DIAGNOSIS — I25.10 CORONARY ARTERY DISEASE INVOLVING NATIVE CORONARY ARTERY OF NATIVE HEART WITHOUT ANGINA PECTORIS: Primary | ICD-10-CM

## 2022-12-01 ENCOUNTER — TELEPHONE (OUTPATIENT)
Dept: CARDIOLOGY | Facility: CLINIC | Age: 82
End: 2022-12-01

## 2022-12-01 ENCOUNTER — HOSPITAL ENCOUNTER (OUTPATIENT)
Dept: CARDIOLOGY | Facility: HOSPITAL | Age: 82
Discharge: HOME | End: 2022-12-01
Attending: INTERNAL MEDICINE
Payer: MEDICARE

## 2022-12-01 ENCOUNTER — OFFICE VISIT (OUTPATIENT)
Dept: CARDIOLOGY | Facility: CLINIC | Age: 82
End: 2022-12-01
Payer: MEDICARE

## 2022-12-01 VITALS
DIASTOLIC BLOOD PRESSURE: 68 MMHG | SYSTOLIC BLOOD PRESSURE: 122 MMHG | WEIGHT: 146.81 LBS | HEART RATE: 64 BPM | BODY MASS INDEX: 25.2 KG/M2

## 2022-12-01 DIAGNOSIS — I50.32 CHRONIC DIASTOLIC HEART FAILURE: ICD-10-CM

## 2022-12-01 DIAGNOSIS — I10 ESSENTIAL HYPERTENSION: Chronic | ICD-10-CM

## 2022-12-01 DIAGNOSIS — C83.30 DIFFUSE LARGE B-CELL LYMPHOMA, UNSPECIFIED BODY REGION: ICD-10-CM

## 2022-12-01 DIAGNOSIS — E87.5 HYPERKALEMIA: ICD-10-CM

## 2022-12-01 DIAGNOSIS — R94.2 DIFFUSION CAPACITY OF LUNG (DL), DECREASED: ICD-10-CM

## 2022-12-01 DIAGNOSIS — I25.10 CORONARY ARTERY DISEASE INVOLVING NATIVE CORONARY ARTERY OF NATIVE HEART WITHOUT ANGINA PECTORIS: ICD-10-CM

## 2022-12-01 DIAGNOSIS — Z95.0 CARDIAC PACEMAKER IN SITU: Primary | ICD-10-CM

## 2022-12-01 DIAGNOSIS — R07.9 CHEST PAIN SYNDROME: ICD-10-CM

## 2022-12-01 DIAGNOSIS — I25.10 CORONARY ARTERY DISEASE INVOLVING NATIVE CORONARY ARTERY OF NATIVE HEART WITHOUT ANGINA PECTORIS: Chronic | ICD-10-CM

## 2022-12-01 DIAGNOSIS — E78.2 MIXED HYPERLIPIDEMIA: Chronic | ICD-10-CM

## 2022-12-01 DIAGNOSIS — C85.83 LARGE CELL LYMPHOMA OF INTRA-ABDOMINAL LYMPH NODES: Chronic | ICD-10-CM

## 2022-12-01 DIAGNOSIS — J84.10 CALCIFIED GRANULOMA OF LUNG: ICD-10-CM

## 2022-12-01 DIAGNOSIS — I25.5 ISCHEMIC CARDIOMYOPATHY: Chronic | ICD-10-CM

## 2022-12-01 DIAGNOSIS — N18.32 STAGE 3B CHRONIC KIDNEY DISEASE: Chronic | ICD-10-CM

## 2022-12-01 DIAGNOSIS — I48.0 PAROXYSMAL ATRIAL FIBRILLATION: ICD-10-CM

## 2022-12-01 DIAGNOSIS — I70.0 CALCIFICATION OF AORTA: Chronic | ICD-10-CM

## 2022-12-01 PROCEDURE — 1159F PR MEDICATION LIST DOCUMENTED IN MEDICAL RECORD: ICD-10-PCS | Mod: CPTII,S$GLB,, | Performed by: INTERNAL MEDICINE

## 2022-12-01 PROCEDURE — 1126F PR PAIN SEVERITY QUANTIFIED, NO PAIN PRESENT: ICD-10-PCS | Mod: CPTII,S$GLB,, | Performed by: INTERNAL MEDICINE

## 2022-12-01 PROCEDURE — 3288F FALL RISK ASSESSMENT DOCD: CPT | Mod: CPTII,S$GLB,, | Performed by: INTERNAL MEDICINE

## 2022-12-01 PROCEDURE — 1100F PR PT FALLS ASSESS DOC 2+ FALLS/FALL W/INJURY/YR: ICD-10-PCS | Mod: CPTII,S$GLB,, | Performed by: INTERNAL MEDICINE

## 2022-12-01 PROCEDURE — 99999 PR PBB SHADOW E&M-EST. PATIENT-LVL II: CPT | Mod: PBBFAC,,, | Performed by: INTERNAL MEDICINE

## 2022-12-01 PROCEDURE — 3288F PR FALLS RISK ASSESSMENT DOCUMENTED: ICD-10-PCS | Mod: CPTII,S$GLB,, | Performed by: INTERNAL MEDICINE

## 2022-12-01 PROCEDURE — 1160F PR REVIEW ALL MEDS BY PRESCRIBER/CLIN PHARMACIST DOCUMENTED: ICD-10-PCS | Mod: CPTII,S$GLB,, | Performed by: INTERNAL MEDICINE

## 2022-12-01 PROCEDURE — 99214 PR OFFICE/OUTPT VISIT, EST, LEVL IV, 30-39 MIN: ICD-10-PCS | Mod: S$GLB,,, | Performed by: INTERNAL MEDICINE

## 2022-12-01 PROCEDURE — 93005 ELECTROCARDIOGRAM TRACING: CPT

## 2022-12-01 PROCEDURE — 1157F PR ADVANCE CARE PLAN OR EQUIV PRESENT IN MEDICAL RECORD: ICD-10-PCS | Mod: CPTII,S$GLB,, | Performed by: INTERNAL MEDICINE

## 2022-12-01 PROCEDURE — 1126F AMNT PAIN NOTED NONE PRSNT: CPT | Mod: CPTII,S$GLB,, | Performed by: INTERNAL MEDICINE

## 2022-12-01 PROCEDURE — 3074F PR MOST RECENT SYSTOLIC BLOOD PRESSURE < 130 MM HG: ICD-10-PCS | Mod: CPTII,S$GLB,, | Performed by: INTERNAL MEDICINE

## 2022-12-01 PROCEDURE — 3078F DIAST BP <80 MM HG: CPT | Mod: CPTII,S$GLB,, | Performed by: INTERNAL MEDICINE

## 2022-12-01 PROCEDURE — 1160F RVW MEDS BY RX/DR IN RCRD: CPT | Mod: CPTII,S$GLB,, | Performed by: INTERNAL MEDICINE

## 2022-12-01 PROCEDURE — 93010 EKG 12-LEAD: ICD-10-PCS | Mod: ,,, | Performed by: INTERNAL MEDICINE

## 2022-12-01 PROCEDURE — 99999 PR PBB SHADOW E&M-EST. PATIENT-LVL II: ICD-10-PCS | Mod: PBBFAC,,, | Performed by: INTERNAL MEDICINE

## 2022-12-01 PROCEDURE — 3078F PR MOST RECENT DIASTOLIC BLOOD PRESSURE < 80 MM HG: ICD-10-PCS | Mod: CPTII,S$GLB,, | Performed by: INTERNAL MEDICINE

## 2022-12-01 PROCEDURE — 93010 ELECTROCARDIOGRAM REPORT: CPT | Mod: ,,, | Performed by: INTERNAL MEDICINE

## 2022-12-01 PROCEDURE — 99214 OFFICE O/P EST MOD 30 MIN: CPT | Mod: S$GLB,,, | Performed by: INTERNAL MEDICINE

## 2022-12-01 PROCEDURE — 1159F MED LIST DOCD IN RCRD: CPT | Mod: CPTII,S$GLB,, | Performed by: INTERNAL MEDICINE

## 2022-12-01 PROCEDURE — 1100F PTFALLS ASSESS-DOCD GE2>/YR: CPT | Mod: CPTII,S$GLB,, | Performed by: INTERNAL MEDICINE

## 2022-12-01 PROCEDURE — 1157F ADVNC CARE PLAN IN RCRD: CPT | Mod: CPTII,S$GLB,, | Performed by: INTERNAL MEDICINE

## 2022-12-01 PROCEDURE — 3074F SYST BP LT 130 MM HG: CPT | Mod: CPTII,S$GLB,, | Performed by: INTERNAL MEDICINE

## 2022-12-01 NOTE — PROGRESS NOTES
"Subjective:   Patient ID:  Violet Swenson is a 82 y.o. female who presents for follow up of No chief complaint on file.      HPI  9/17/2020   79 yo female with ;ymphoma  S/p pcae cad s/p stent ckd heart failure ios here for f/u she has low back pain limited has had recurrent episodes of intrascapular pain radiating to left arm feels like muscle spasm none with exertion. No chf symptoms no leg swelling tia claudication chest pain syncope near syncope/.no palpitation. Has some improvement in appetite  .  11/16  Violet Swenson is a 80 y.o. female patient with a h/o anemia, anxiety, atrial flutter, lymphoma large cell B, CHF, CAD, depression, GERD, gout, heart failure, HLD, HTN, hypothyroidism kidney disease, lung nodule, obesity, metronic pacemaker, polyneuropathy, who presents to the Emergency Department for evaluation of fatigue which onset this morning. Per [facility], the pt has been intermittently stuttering her speech and was hypoglycemic upon arriving on scene. Pt's daughter states that the pt woke up and was chatting to her before she left the house around 0800 this morning. The pt reports falling back asleep and waking up around 0930 feeling very weak and fatigued. She states, "when I rolled over after waking up, it felt like someone was pushing me back in bed." Associated sxs include R arm weakness, generalized weakness, slurred speech, and trouble ambulating. Patient denies any fever, SOB, CP, n/v, numbness, dizziness, and all other sxs at this time. In the ED, H/H 8.9/28.6, Creatinine 1.5, , TSH 4.087, CT head with no acute findings. Tele-stroke recommended MRI/MRA brain, MRA neck lipid panel, hemoglobin A1c. Add ASA to Plavix if x 30 days if no contraindication. Atorvastatin 40 mg daily. Neurology, PT/speech consults. Patient placed in observation for CVA rule out under the care of hospital medicine.      * No surgery found *       Hospital Course:   Place an observation for " evaluation and treatment of right arm weakness, slurred speech, and gait instability. Symptoms concerning for acute stroke. Initially perform a CT of the head which showed no acute findings. Patient was unable to perform an MRI or MRA due to having an implantable device. Empirically treated for transient ischemic attack versus stroke. Performed an interval CT scan of the head which was also negative. She was evaluated by neurology who assisted with management. Neurology determined that even though we were unable to get positive imaging findings that she should be treated as likely having had an acute stroke.  Physical and occupational therapy evaluated the patient and recommended outpatient therapy. Speech language pathology evaluated the patient and recommended no restrictions. Discharge plan to return home and continue medication plan outpatient physical therapy and follow-up with primary care as needed         11/27/2020  Had chest pain since yesterday it is tight all over chest no associated shortness of breath. Her bp is elevated she cannot get comfortable feels like tightness . Has no leg swelling her speech and vision improved but he rrt sided weakness still evident she is taking her antiplatelet. She has not taken her meds this  Morning. She is not feeling good during the vist the pain has been ongoing since 4 pm yesterday. I gave her a s/l nitro and she improved.      12/14/2020     Here for f/u after hospital admit. She r/o for mi had cardiolite negative for ischemia. Her medical therapy was adjusted taken off gabapentin she has her aches back all over . She is not able to sleep at nite. No further cardiac symptomatology.     6/7/2021  Here for f/u her bp was elevated she ate salt with watermelon has no new complaints of leg swelling shortness of breath tia claudication syncope near syncope no blurred vision. She is only n lopressor half pill bid due yo hypotension/.      11/4/2021   Has been having pnd  over the past 2 weeks she wakes up short of breath has to get up move around. Has also some shortness of breath during the day. She has no palpitation. Has  No leg swelling her bp has been elevated. Has  Been compliant with diet. Has continous chest tightness.has no exertional angina. The chest pain is chronic she had is the reason she was sent top er last year. cardiolite was negative.      11/5/2021   Pacer interrogation showed afib since september 20 nitropach added this morning her bp is elevated had her echo today no labs done .she is on asa and plavix no arb      11/24/2021   [facility] AD[facility] PLACED ON [facility] HAD SIGNIFICANT MITRAL REGURGITATION HAD HELGA CARDIOVERSION TO SR HAD CARDIOLITE WAS NEGATIVE    HER CHEST PAIN SHORTNESS OF BREATH RESOLVED SHE IS COMPLIANT WITH MEDS HTN CONTROLLED NO DIZZINESS LIGHTHEADEDNESS.      3/3/2022  Here frof /u no new complaints of chest pain shortness of breath palpitation syncope near syncope compliant with meds. Had falls twice uses her walker. No chf symptoms opr angina.    12/1/2022  Had  frequent falls. Has balance issues. She has sprained her left wrist . Has no cardiac symptoms. Pacer function adequate. Has no syncope no lightheadedness has occasional palpitation. Elevated tsh is being addressed by pcp dose adjusted,   Past Medical History:   Diagnosis Date    Age-related osteoporosis without current pathological fracture 8/20/2018    Anemia     Anxiety     Arthritis     Atrial flutter     Cancer     lymphoma Large cell B    CHF (congestive heart failure)     Chronic anemia 4/26/2017    Chronic midline low back pain with right-sided sciatica 8/20/2018    Coronary artery disease     01/2015 [facility] patent LCX. 50% stenosis in LAD and RCA.      Depression     Disorder of kidney and ureter     Encounter for blood transfusion     GERD (gastroesophageal reflux disease)     Gout, arthritis     Heart failure     Hx of psychiatric  care     Hyperlipidemia     Hypertension     Hypothyroidism     Immune deficiency disorder     Kidney disease     Lung nodule 2014    RML--stable    Obesity     Pacemaker     Metronic    Paroxysmal atrial fibrillation 3/15/2018    Pneumonia     Polyneuropathy     chemo induced    Psychiatric problem     Stroke 2020    Tobacco dependence     quit 1976    Trouble in sleeping     Unstable angina 2020       Past Surgical History:   Procedure Laterality Date    APPENDECTOMY  1966 approx    CARDIAC PACEMAKER PLACEMENT  2015    CHOLECYSTECTOMY  1993    incidental at time of gastric bypass    COLON SURGERY Right 2017    hemicolectomy    COLONOSCOPY N/A 2017    Procedure: COLONOSCOPY;  Surgeon: Tye Enamorado MD;  Location: [facility];  Service: Endoscopy;  Laterality: N/A;    COLONOSCOPY N/A 2018    Procedure: COLONOSCOPY;  Surgeon: Saúl Arthur III, MD;  Location: [facility];  Service: Endoscopy;  Laterality: N/A;    CORONARY ANGIOPLASTY  2014    CORONARY STENT PLACEMENT  2014    ESOPHAGOGASTRODUODENOSCOPY N/A 2018    Procedure: EGD (ESOPHAGOGASTRODUODENOSCOPY);  Surgeon: Saúl Arthur III, MD;  Location: [facility];  Service: Endoscopy;  Laterality: N/A;    GASTRIC BYPASS  1993    with incidental choly    HERNIA REPAIR      INJECTION OF ANESTHETIC AGENT INTO SACROILIAC JOINT Right 10/8/2020    Procedure: Right BLOCK, SACROILIAC JOINT with RN IV sedation;  Surgeon: Madi Anton MD;  Location: [facility];  Service: Pain Management;  Laterality: Right;    JOINT REPLACEMENT Bilateral     3 months apart    TONSILLECTOMY         Social History     Tobacco Use    Smoking status: Former     Packs/day: 2.00     Years: 6.00     Pack years: 12.00     Types: Cigarettes     Quit date: 3/15/1976     Years since quittin.7    Smokeless tobacco: Never   Substance Use Topics    Alcohol use: No     Alcohol/week: 0.0 standard drinks    Drug use: No       Family History   Problem  "Relation Age of Onset    Heart disease Mother     Hypertension Mother     Cataracts Mother     Stomach cancer Father         "ulcers that turned to cancer"    Cancer Father         stomach    Pancreatic cancer Sister     Cancer Sister         pancreatic    Leukemia Brother         "leukemia which led to intestinal cancer"    Cataracts Brother     Cancer Brother         leukemia then later stomach cancer    Drug abuse Son     Cancer Son         prostate cancer    Prostate cancer Son     COPD Daughter     Asthma Daughter     Hypertension Maternal Grandfather     Stroke Maternal Grandfather     Alcohol abuse Neg Hx     Diabetes Neg Hx     Mental retardation Neg Hx     Mental illness Neg Hx        Current Outpatient Medications   Medication Sig    allopurinoL (ZYLOPRIM) 300 MG tablet Take 1 tablet (300 mg total) by mouth once daily.    ascorbic acid, vitamin C, (VITAMIN C) 100 MG tablet Take by mouth once daily.    aspirin (ECOTRIN) 81 MG EC tablet Take 81 mg by mouth nightly.     busPIRone (BUSPAR) 7.5 MG tablet TAKE ONE TABLET BY MOUTH THREE TIMES DAILY    calcitRIOL (ROCALTROL) 0.25 MCG Cap TAKE ONE CAPSULE BY MOUTH EVERY MONDAY, WEDNESDAY AND FRIDAY    clopidogreL (PLAVIX) 75 mg tablet TAKE ONE TABLET BY MOUTH EVERY DAY    diclofenac sodium 1 % Gel Apply 2 g topically once daily. Apply 2 g over painful joints once or twice a day.    DIPRIVAN 10 mg/mL infusion     DULoxetine (CYMBALTA) 30 MG capsule TAKE ONE CAPSULE BY MOUTH EVERY DAY    ELIQUIS 2.5 mg Tab Take 1 tablet (2.5 mg total) by mouth 2 (two) times daily.    ergocalciferol (ERGOCALCIFEROL) 50,000 unit Cap Take 1 capsule (50,000 Units total) by mouth every 7 days.    fluticasone propionate (FLONASE) 50 mcg/actuation nasal spray 2 sprays (100 mcg total) by Each Nostril route once daily.    hydrOXYchloroQUINE (PLAQUENIL) 200 mg tablet Take 1 tablet (200 mg total) by mouth once daily.    iron-vitamin C 100-250 mg, ICAR-C, 100-250 mg Tab TAKE ONE TABLET BY MOUTH " EVERY DAY    levocetirizine (XYZAL) 5 MG tablet Take 1 tablet (5 mg total) by mouth every evening.    levothyroxine (SYNTHROID) 75 MCG tablet TAKE ONE TABLET BY MOUTH EVERY DAY BEFORE BREAKFAST    metoprolol tartrate (LOPRESSOR) 25 MG tablet TAKE ONE TABLET BY MOUTH TWICE DAILY    mirtazapine (REMERON SOL-TAB) 15 MG disintegrating tablet DISSOLVE 1 TABLET ON THE TONGUE AT BEDTIME FOR INSOMNIA    multivitamin (THERAGRAN) per tablet Take 1 tablet by mouth once daily.    pravastatin (PRAVACHOL) 40 MG tablet TAKE ONE TABLET BY MOUTH ONCE DAILY    pregabalin (LYRICA) 50 MG capsule Take 1 capsule (50 mg total) by mouth 3 (three) times daily.    sertraline (ZOLOFT) 100 MG tablet TAKE ONE AND ONE-HALF TABLETS (150MG TOTAL) BY MOUTH ONCE DAILY    sodium bicarbonate 650 MG tablet Take 1 tablet (650 mg total) by mouth 2 (two) times daily.    traMADoL (ULTRAM) 50 mg tablet Take 1 tablet (50 mg total) by mouth every 8 (eight) hours as needed for Pain.    ZINC ACETATE ORAL Take 250 mg by mouth once daily.     furosemide (LASIX) 40 MG tablet Take 1 tablet (40 mg total) by mouth once daily.    nitroGLYCERIN 0.2 mg/hr TD PT24 (NITRODUR) 0.2 mg/hr Place 1 patch onto the skin once daily.     Current Facility-Administered Medications   Medication    denosumab (PROLIA) injection 60 mg     Current Outpatient Medications on File Prior to Visit   Medication Sig    allopurinoL (ZYLOPRIM) 300 MG tablet Take 1 tablet (300 mg total) by mouth once daily.    ascorbic acid, vitamin C, (VITAMIN C) 100 MG tablet Take by mouth once daily.    aspirin (ECOTRIN) 81 MG EC tablet Take 81 mg by mouth nightly.     busPIRone (BUSPAR) 7.5 MG tablet TAKE ONE TABLET BY MOUTH THREE TIMES DAILY    calcitRIOL (ROCALTROL) 0.25 MCG Cap TAKE ONE CAPSULE BY MOUTH EVERY MONDAY, WEDNESDAY AND FRIDAY    clopidogreL (PLAVIX) 75 mg tablet TAKE ONE TABLET BY MOUTH EVERY DAY    diclofenac sodium 1 % Gel Apply 2 g topically once daily. Apply 2 g over painful joints once or  twice a day.    DIPRIVAN 10 mg/mL infusion     DULoxetine (CYMBALTA) 30 MG capsule TAKE ONE CAPSULE BY MOUTH EVERY DAY    ELIQUIS 2.5 mg Tab Take 1 tablet (2.5 mg total) by mouth 2 (two) times daily.    ergocalciferol (ERGOCALCIFEROL) 50,000 unit Cap Take 1 capsule (50,000 Units total) by mouth every 7 days.    fluticasone propionate (FLONASE) 50 mcg/actuation nasal spray 2 sprays (100 mcg total) by Each Nostril route once daily.    hydrOXYchloroQUINE (PLAQUENIL) 200 mg tablet Take 1 tablet (200 mg total) by mouth once daily.    iron-vitamin C 100-250 mg, ICAR-C, 100-250 mg Tab TAKE ONE TABLET BY MOUTH EVERY DAY    levocetirizine (XYZAL) 5 MG tablet Take 1 tablet (5 mg total) by mouth every evening.    levothyroxine (SYNTHROID) 75 MCG tablet TAKE ONE TABLET BY MOUTH EVERY DAY BEFORE BREAKFAST    metoprolol tartrate (LOPRESSOR) 25 MG tablet TAKE ONE TABLET BY MOUTH TWICE DAILY    mirtazapine (REMERON SOL-TAB) 15 MG disintegrating tablet DISSOLVE 1 TABLET ON THE TONGUE AT BEDTIME FOR INSOMNIA    multivitamin (THERAGRAN) per tablet Take 1 tablet by mouth once daily.    pravastatin (PRAVACHOL) 40 MG tablet TAKE ONE TABLET BY MOUTH ONCE DAILY    pregabalin (LYRICA) 50 MG capsule Take 1 capsule (50 mg total) by mouth 3 (three) times daily.    sertraline (ZOLOFT) 100 MG tablet TAKE ONE AND ONE-HALF TABLETS (150MG TOTAL) BY MOUTH ONCE DAILY    sodium bicarbonate 650 MG tablet Take 1 tablet (650 mg total) by mouth 2 (two) times daily.    traMADoL (ULTRAM) 50 mg tablet Take 1 tablet (50 mg total) by mouth every 8 (eight) hours as needed for Pain.    ZINC ACETATE ORAL Take 250 mg by mouth once daily.     furosemide (LASIX) 40 MG tablet Take 1 tablet (40 mg total) by mouth once daily.    nitroGLYCERIN 0.2 mg/hr TD PT24 (NITRODUR) 0.2 mg/hr Place 1 patch onto the skin once daily.     Current Facility-Administered Medications on File Prior to Visit   Medication    denosumab (PROLIA) injection 60 mg     Review of patient's  allergies indicates:   Allergen Reactions    Corticosteroids (glucocorticoids) Nausea Only and Other (See Comments)     Stomach pain, dizziness, headache    Oxycodone Other (See Comments)     Blood pressure dropped        Review of Systems   Constitutional: Negative for diaphoresis, malaise/fatigue and weight gain.   HENT:  Negative for hoarse voice.    Eyes:  Negative for double vision and visual disturbance.   Cardiovascular:  Negative for chest pain, claudication, cyanosis, dyspnea on exertion, irregular heartbeat, leg swelling, near-syncope, orthopnea, palpitations, paroxysmal nocturnal dyspnea and syncope.   Respiratory:  Negative for cough, hemoptysis, shortness of breath and snoring.    Hematologic/Lymphatic: Negative for bleeding problem. Does not bruise/bleed easily.   Skin:  Negative for color change and poor wound healing.   Musculoskeletal:  Positive for falls. Negative for muscle cramps, muscle weakness and myalgias.   Gastrointestinal:  Negative for bloating, abdominal pain, change in bowel habit, diarrhea, heartburn, hematemesis, hematochezia, melena and nausea.   Neurological:  Negative for excessive daytime sleepiness, dizziness, headaches, light-headedness, loss of balance, numbness and weakness.   Psychiatric/Behavioral:  Negative for memory loss. The patient does not have insomnia.    Allergic/Immunologic: Negative for hives.     Objective:   Physical Exam  Constitutional:       General: She is not in acute distress.     Appearance: Normal appearance. She is well-developed. She is not ill-appearing.   HENT:      Head: Normocephalic and atraumatic.   Eyes:      General: No scleral icterus.     Pupils: Pupils are equal, round, and reactive to light.   Neck:      Thyroid: No thyromegaly.      Vascular: Normal carotid pulses. No carotid bruit, hepatojugular reflux or JVD.      Trachea: No tracheal deviation.   Cardiovascular:      Rate and Rhythm: Normal rate and regular rhythm.      Pulses: Normal  pulses.      Heart sounds: Normal heart sounds. No murmur heard.    No friction rub. No gallop.   Pulmonary:      Effort: Pulmonary effort is normal. No respiratory distress.      Breath sounds: Normal breath sounds. No wheezing, rhonchi or rales.   Chest:      Chest wall: No tenderness.   Abdominal:      General: Bowel sounds are normal. There is no abdominal bruit.      Palpations: Abdomen is soft. There is no hepatomegaly or pulsatile mass.      Tenderness: There is no abdominal tenderness.   Musculoskeletal:      Right shoulder: No deformity.      Cervical back: Normal range of motion and neck supple.      Right lower leg: No edema.      Left lower leg: No edema.   Skin:     General: Skin is warm and dry.      Findings: No erythema or rash.      Nails: There is no clubbing.   Neurological:      Mental Status: She is alert and oriented to person, place, and time.      Cranial Nerves: No cranial nerve deficit.      Coordination: Coordination normal.   Psychiatric:         Speech: Speech normal.         Behavior: Behavior normal.         Judgment: Judgment normal.     Vitals:    12/01/22 1410   BP: 122/68   BP Location: Right arm   Patient Position: Sitting   BP Method: Medium (Manual)   Pulse: 64   Weight: 66.6 kg (146 lb 13.2 oz)     Lab Results   Component Value Date    CHOL 153 03/30/2022    CHOL 131 11/30/2021    CHOL 140 06/01/2021     Lab Results   Component Value Date    HDL 67 03/30/2022    HDL 61 11/30/2021    HDL 78 (H) 06/01/2021     Lab Results   Component Value Date    LDLCALC 69.8 03/30/2022    LDLCALC 55.2 (L) 11/30/2021    LDLCALC 43.2 (L) 06/01/2021     Lab Results   Component Value Date    TRIG 81 03/30/2022    TRIG 74 11/30/2021    TRIG 94 06/01/2021     Lab Results   Component Value Date    CHOLHDL 43.8 03/30/2022    CHOLHDL 46.6 11/30/2021    CHOLHDL 55.7 (H) 06/01/2021       Chemistry        Component Value Date/Time     10/11/2022 1016    K 5.7 (H) 10/11/2022 1016     10/11/2022  1016    CO2 18 (L) 10/11/2022 1016    BUN 46 (H) 10/11/2022 1016    CREATININE 1.8 (H) 10/11/2022 1016    GLU 79 10/11/2022 1016        Component Value Date/Time    CALCIUM 10.0 10/11/2022 1016    ALKPHOS 81 10/11/2022 1016    AST 72 (H) 10/11/2022 1016    ALT 77 (H) 10/11/2022 1016    BILITOT 0.3 10/11/2022 1016    ESTGFRAFRICA 35 (A) 05/31/2022 0909    EGFRNONAA 30 (A) 05/31/2022 0909          Lab Results   Component Value Date    TSH 8.254 (H) 03/30/2022     Lab Results   Component Value Date    INR 0.9 11/07/2021    INR 1.0 11/06/2021    INR 1.0 11/27/2020     Lab Results   Component Value Date    WBC 7.12 05/31/2022    HGB 11.3 (L) 05/31/2022    HCT 36.8 (L) 05/31/2022     (H) 05/31/2022     05/31/2022     BMP  Sodium   Date Value Ref Range Status   10/11/2022 140 136 - 145 mmol/L Final     Potassium   Date Value Ref Range Status   10/11/2022 5.7 (H) 3.5 - 5.1 mmol/L Final     Chloride   Date Value Ref Range Status   10/11/2022 110 95 - 110 mmol/L Final     CO2   Date Value Ref Range Status   10/11/2022 18 (L) 23 - 29 mmol/L Final     BUN   Date Value Ref Range Status   10/11/2022 46 (H) 8 - 23 mg/dL Final     Creatinine   Date Value Ref Range Status   10/11/2022 1.8 (H) 0.5 - 1.4 mg/dL Final     Calcium   Date Value Ref Range Status   10/11/2022 10.0 8.7 - 10.5 mg/dL Final     Anion Gap   Date Value Ref Range Status   10/11/2022 12 8 - 16 mmol/L Final     eGFR if    Date Value Ref Range Status   05/31/2022 35 (A) >60 mL/min/1.73 m^2 Final     eGFR if non    Date Value Ref Range Status   05/31/2022 30 (A) >60 mL/min/1.73 m^2 Final     Comment:     Calculation used to obtain the estimated glomerular filtration  rate (eGFR) is the CKD-EPI equation.        CrCl cannot be calculated (Patient's most recent lab result is older than the maximum 7 days allowed.).    Assessment:     1. Cardiac pacemaker in situ    2. Calcified granuloma of lung    3. Diffusion capacity of  lung (dl), decreased    4. Paroxysmal atrial fibrillation    5. Calcification of aorta    6. Chronic diastolic heart failure    7. Chest pain syndrome    8. Coronary artery disease : multiple vessels, s/p PTCA    9. Essential hypertension    10. Ischemic cardiomyopathy    11. Mixed hyperlipidemia    12. Stage 3b chronic kidney disease    13. Hyperkalemia    14. Diffuse large B-cell lymphoma, unspecified body region    15. 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 Low salt diet   Hlp on target continue same therapy    Cad asymptomatic continue current therapy.  Lymphoma follows with oncology.  Chf well compensated asymptomatic continue same    Frequent falls precautions discussed.   Plan:   Continue current therapy  Cardiac low salt diet.  Risk factor modification and excercise program.  F/u in 6 months with lipid cmp

## 2022-12-15 ENCOUNTER — TELEPHONE (OUTPATIENT)
Dept: ADMINISTRATIVE | Facility: HOSPITAL | Age: 82
End: 2022-12-15
Payer: MEDICARE

## 2023-01-05 ENCOUNTER — PES CALL (OUTPATIENT)
Dept: ADMINISTRATIVE | Facility: CLINIC | Age: 83
End: 2023-01-05
Payer: MEDICARE

## 2023-01-19 ENCOUNTER — OFFICE VISIT (OUTPATIENT)
Dept: INTERNAL MEDICINE | Facility: CLINIC | Age: 83
End: 2023-01-19
Payer: MEDICARE

## 2023-01-19 VITALS
RESPIRATION RATE: 18 BRPM | WEIGHT: 143.06 LBS | SYSTOLIC BLOOD PRESSURE: 118 MMHG | TEMPERATURE: 98 F | DIASTOLIC BLOOD PRESSURE: 72 MMHG | HEART RATE: 74 BPM | OXYGEN SATURATION: 91 % | HEIGHT: 64 IN | BODY MASS INDEX: 24.42 KG/M2

## 2023-01-19 DIAGNOSIS — E03.9 HYPOTHYROIDISM (ACQUIRED): Chronic | ICD-10-CM

## 2023-01-19 DIAGNOSIS — D84.821 IMMUNOSUPPRESSION DUE TO DRUG THERAPY: ICD-10-CM

## 2023-01-19 DIAGNOSIS — Z23 NEED FOR INFLUENZA VACCINATION: ICD-10-CM

## 2023-01-19 DIAGNOSIS — I10 ESSENTIAL HYPERTENSION: Chronic | ICD-10-CM

## 2023-01-19 DIAGNOSIS — I50.32 CHRONIC DIASTOLIC HEART FAILURE: ICD-10-CM

## 2023-01-19 DIAGNOSIS — E21.3 HYPERPARATHYROIDISM: ICD-10-CM

## 2023-01-19 DIAGNOSIS — T45.1X5A CHEMOTHERAPY-INDUCED NEUROPATHY: ICD-10-CM

## 2023-01-19 DIAGNOSIS — D64.9 CHRONIC ANEMIA: Chronic | ICD-10-CM

## 2023-01-19 DIAGNOSIS — I70.0 ATHEROSCLEROSIS OF AORTA: ICD-10-CM

## 2023-01-19 DIAGNOSIS — J84.10 CALCIFIED GRANULOMA OF LUNG: ICD-10-CM

## 2023-01-19 DIAGNOSIS — Z79.899 IMMUNOSUPPRESSION DUE TO DRUG THERAPY: ICD-10-CM

## 2023-01-19 DIAGNOSIS — E55.9 VITAMIN D DEFICIENCY: ICD-10-CM

## 2023-01-19 DIAGNOSIS — N18.4 STAGE 4 CHRONIC KIDNEY DISEASE: ICD-10-CM

## 2023-01-19 DIAGNOSIS — I48.0 PAROXYSMAL ATRIAL FIBRILLATION: ICD-10-CM

## 2023-01-19 DIAGNOSIS — I25.5 ISCHEMIC CARDIOMYOPATHY: Chronic | ICD-10-CM

## 2023-01-19 DIAGNOSIS — J31.0 CHRONIC RHINITIS: ICD-10-CM

## 2023-01-19 DIAGNOSIS — F41.9 ANXIETY: ICD-10-CM

## 2023-01-19 DIAGNOSIS — C83.30 DIFFUSE LARGE B-CELL LYMPHOMA, UNSPECIFIED BODY REGION: ICD-10-CM

## 2023-01-19 DIAGNOSIS — F41.8 SITUATIONAL ANXIETY: ICD-10-CM

## 2023-01-19 DIAGNOSIS — E78.2 MIXED HYPERLIPIDEMIA: Chronic | ICD-10-CM

## 2023-01-19 DIAGNOSIS — F33.42 RECURRENT MAJOR DEPRESSIVE DISORDER, IN FULL REMISSION: ICD-10-CM

## 2023-01-19 DIAGNOSIS — Z00.00 ROUTINE GENERAL MEDICAL EXAMINATION AT A HEALTH CARE FACILITY: Primary | ICD-10-CM

## 2023-01-19 DIAGNOSIS — I20.9 ANGINA PECTORIS: ICD-10-CM

## 2023-01-19 DIAGNOSIS — G62.0 CHEMOTHERAPY-INDUCED NEUROPATHY: ICD-10-CM

## 2023-01-19 PROBLEM — F43.29 ADJUSTMENT DISORDER WITH EMOTIONAL DISTURBANCE: Status: RESOLVED | Noted: 2017-06-02 | Resolved: 2023-01-19

## 2023-01-19 PROBLEM — N18.32 STAGE 3B CHRONIC KIDNEY DISEASE: Status: ACTIVE | Noted: 2018-02-14

## 2023-01-19 PROBLEM — R53.1 DECREASED STRENGTH: Status: RESOLVED | Noted: 2020-09-17 | Resolved: 2023-01-19

## 2023-01-19 PROBLEM — M79.10 MYALGIA: Status: RESOLVED | Noted: 2018-12-19 | Resolved: 2023-01-19

## 2023-01-19 PROBLEM — R53.1 RIGHT SIDED WEAKNESS: Status: RESOLVED | Noted: 2020-12-01 | Resolved: 2023-01-19

## 2023-01-19 PROBLEM — M79.18 MYOFASCIAL PAIN: Status: RESOLVED | Noted: 2017-02-01 | Resolved: 2023-01-19

## 2023-01-19 PROBLEM — M62.40 INVOLUNTARY MUSCLE CONTRACTIONS: Status: RESOLVED | Noted: 2020-08-27 | Resolved: 2023-01-19

## 2023-01-19 PROBLEM — G89.29 CHRONIC MIDLINE LOW BACK PAIN WITHOUT SCIATICA: Status: RESOLVED | Noted: 2020-09-14 | Resolved: 2023-01-19

## 2023-01-19 PROBLEM — Z86.79 HISTORY OF CONGESTIVE HEART FAILURE: Status: RESOLVED | Noted: 2017-08-17 | Resolved: 2023-01-19

## 2023-01-19 PROBLEM — M54.50 CHRONIC MIDLINE LOW BACK PAIN WITHOUT SCIATICA: Status: RESOLVED | Noted: 2020-09-14 | Resolved: 2023-01-19

## 2023-01-19 PROBLEM — M65.842 STENOSING TENOSYNOVITIS OF FINGER OF LEFT HAND: Status: RESOLVED | Noted: 2017-02-01 | Resolved: 2023-01-19

## 2023-01-19 PROBLEM — R91.8 LUNG NODULES: Status: RESOLVED | Noted: 2021-11-02 | Resolved: 2023-01-19

## 2023-01-19 PROBLEM — G62.9 NEUROPATHY: Status: RESOLVED | Noted: 2020-03-09 | Resolved: 2023-01-19

## 2023-01-19 PROBLEM — E43 SEVERE MALNUTRITION: Status: RESOLVED | Noted: 2021-11-08 | Resolved: 2023-01-19

## 2023-01-19 PROBLEM — M79.641 PAIN OF RIGHT HAND: Status: RESOLVED | Noted: 2022-10-11 | Resolved: 2023-01-19

## 2023-01-19 PROBLEM — L29.9 ITCHING: Status: RESOLVED | Noted: 2019-11-12 | Resolved: 2023-01-19

## 2023-01-19 PROBLEM — R10.9 ABDOMINAL PAIN: Status: RESOLVED | Noted: 2017-04-05 | Resolved: 2023-01-19

## 2023-01-19 PROBLEM — M18.9: Status: RESOLVED | Noted: 2018-02-14 | Resolved: 2023-01-19

## 2023-01-19 PROCEDURE — 1126F AMNT PAIN NOTED NONE PRSNT: CPT | Mod: HCNC,CPTII,S$GLB, | Performed by: PHYSICIAN ASSISTANT

## 2023-01-19 PROCEDURE — 3074F SYST BP LT 130 MM HG: CPT | Mod: HCNC,CPTII,S$GLB, | Performed by: PHYSICIAN ASSISTANT

## 2023-01-19 PROCEDURE — 1160F PR REVIEW ALL MEDS BY PRESCRIBER/CLIN PHARMACIST DOCUMENTED: ICD-10-PCS | Mod: HCNC,CPTII,S$GLB, | Performed by: PHYSICIAN ASSISTANT

## 2023-01-19 PROCEDURE — 1157F ADVNC CARE PLAN IN RCRD: CPT | Mod: HCNC,CPTII,S$GLB, | Performed by: PHYSICIAN ASSISTANT

## 2023-01-19 PROCEDURE — 1160F RVW MEDS BY RX/DR IN RCRD: CPT | Mod: HCNC,CPTII,S$GLB, | Performed by: PHYSICIAN ASSISTANT

## 2023-01-19 PROCEDURE — G0008 ADMIN INFLUENZA VIRUS VAC: HCPCS | Mod: HCNC,S$GLB,, | Performed by: PHYSICIAN ASSISTANT

## 2023-01-19 PROCEDURE — 1157F PR ADVANCE CARE PLAN OR EQUIV PRESENT IN MEDICAL RECORD: ICD-10-PCS | Mod: HCNC,CPTII,S$GLB, | Performed by: PHYSICIAN ASSISTANT

## 2023-01-19 PROCEDURE — 1159F MED LIST DOCD IN RCRD: CPT | Mod: HCNC,CPTII,S$GLB, | Performed by: PHYSICIAN ASSISTANT

## 2023-01-19 PROCEDURE — G0008 FLU VACCINE - QUADRIVALENT - ADJUVANTED: ICD-10-PCS | Mod: HCNC,S$GLB,, | Performed by: PHYSICIAN ASSISTANT

## 2023-01-19 PROCEDURE — 3288F PR FALLS RISK ASSESSMENT DOCUMENTED: ICD-10-PCS | Mod: HCNC,CPTII,S$GLB, | Performed by: PHYSICIAN ASSISTANT

## 2023-01-19 PROCEDURE — 99999 PR PBB SHADOW E&M-EST. PATIENT-LVL V: CPT | Mod: PBBFAC,HCNC,, | Performed by: PHYSICIAN ASSISTANT

## 2023-01-19 PROCEDURE — 99397 PER PM REEVAL EST PAT 65+ YR: CPT | Mod: HCNC,S$GLB,, | Performed by: PHYSICIAN ASSISTANT

## 2023-01-19 PROCEDURE — 1159F PR MEDICATION LIST DOCUMENTED IN MEDICAL RECORD: ICD-10-PCS | Mod: HCNC,CPTII,S$GLB, | Performed by: PHYSICIAN ASSISTANT

## 2023-01-19 PROCEDURE — 3078F PR MOST RECENT DIASTOLIC BLOOD PRESSURE < 80 MM HG: ICD-10-PCS | Mod: HCNC,CPTII,S$GLB, | Performed by: PHYSICIAN ASSISTANT

## 2023-01-19 PROCEDURE — 3288F FALL RISK ASSESSMENT DOCD: CPT | Mod: HCNC,CPTII,S$GLB, | Performed by: PHYSICIAN ASSISTANT

## 2023-01-19 PROCEDURE — 90694 FLU VACCINE - QUADRIVALENT - ADJUVANTED: ICD-10-PCS | Mod: HCNC,S$GLB,, | Performed by: PHYSICIAN ASSISTANT

## 2023-01-19 PROCEDURE — 1126F PR PAIN SEVERITY QUANTIFIED, NO PAIN PRESENT: ICD-10-PCS | Mod: HCNC,CPTII,S$GLB, | Performed by: PHYSICIAN ASSISTANT

## 2023-01-19 PROCEDURE — 1100F PTFALLS ASSESS-DOCD GE2>/YR: CPT | Mod: HCNC,CPTII,S$GLB, | Performed by: PHYSICIAN ASSISTANT

## 2023-01-19 PROCEDURE — 1100F PR PT FALLS ASSESS DOC 2+ FALLS/FALL W/INJURY/YR: ICD-10-PCS | Mod: HCNC,CPTII,S$GLB, | Performed by: PHYSICIAN ASSISTANT

## 2023-01-19 PROCEDURE — 90694 VACC AIIV4 NO PRSRV 0.5ML IM: CPT | Mod: HCNC,S$GLB,, | Performed by: PHYSICIAN ASSISTANT

## 2023-01-19 PROCEDURE — 99397 PR PREVENTIVE VISIT,EST,65 & OVER: ICD-10-PCS | Mod: HCNC,S$GLB,, | Performed by: PHYSICIAN ASSISTANT

## 2023-01-19 PROCEDURE — 3074F PR MOST RECENT SYSTOLIC BLOOD PRESSURE < 130 MM HG: ICD-10-PCS | Mod: HCNC,CPTII,S$GLB, | Performed by: PHYSICIAN ASSISTANT

## 2023-01-19 PROCEDURE — 99999 PR PBB SHADOW E&M-EST. PATIENT-LVL V: ICD-10-PCS | Mod: PBBFAC,HCNC,, | Performed by: PHYSICIAN ASSISTANT

## 2023-01-19 PROCEDURE — 3078F DIAST BP <80 MM HG: CPT | Mod: HCNC,CPTII,S$GLB, | Performed by: PHYSICIAN ASSISTANT

## 2023-01-19 RX ORDER — AZELASTINE 1 MG/ML
1 SPRAY, METERED NASAL 2 TIMES DAILY
Qty: 30 ML | Refills: 0 | Status: SHIPPED | OUTPATIENT
Start: 2023-01-19

## 2023-01-19 RX ORDER — BUSPIRONE HYDROCHLORIDE 7.5 MG/1
7.5 TABLET ORAL 2 TIMES DAILY
Qty: 180 TABLET | Refills: 3 | Status: SHIPPED | OUTPATIENT
Start: 2023-01-19 | End: 2023-12-15

## 2023-01-19 NOTE — PATIENT INSTRUCTIONS
"The bivalent covid booster is now available. You can schedule an appointment for the vaccine through [facility] or ask  to assist you with scheduling an appointment for the vaccine.    The bivalent vaccines, known as the "Omicron" vaccines, target both the original strain of COVID-19 as well as the Omicron variant, which is now the predominant strain in the [place].    The Omicron booster is authorized for individuals 12 years and older and is given two months after last dose of primary or booster shot.   "

## 2023-01-19 NOTE — PROGRESS NOTES
Subjective:       Patient ID: Violet Swenson is a 82 y.o. female.    Chief Complaint: Annual Exam      Patient presents to clinic today for annual physical exam.      Review of Systems   Constitutional:  Positive for fatigue (Chronic). Negative for chills, fever and unexpected weight change.   HENT:  Positive for dental problem (Chronic, scared to go to the dentist) and rhinorrhea (Chronic). Negative for congestion, ear pain, hearing loss and trouble swallowing.    Eyes:  Negative for pain and visual disturbance.   Respiratory:  Negative for cough and shortness of breath.    Cardiovascular:  Negative for chest pain, palpitations and leg swelling.   Gastrointestinal:  Positive for vomiting (Chronic, intermittent, status post gastric bypass). Negative for abdominal distention, abdominal pain, blood in stool, constipation, diarrhea and nausea.   Genitourinary:  Negative for difficulty urinating and vaginal discharge.   Musculoskeletal:  Positive for arthralgias (Chronic) and myalgias (Chronic).   Skin:  Negative for rash.   Neurological:  Positive for weakness (Chronic, fatigue). Negative for dizziness, numbness and headaches.   Hematological:  Negative for adenopathy. Bruises/bleeds easily (Chronic bruising, on blood thinners).   Psychiatric/Behavioral:  Negative for dysphoric mood and sleep disturbance. The patient is not nervous/anxious.      Objective:      Physical Exam  Vitals and nursing note reviewed.   Constitutional:       General: She is not in acute distress.     Appearance: Normal appearance. She is well-developed.      Comments: Uses Rollator walker for assistive device   HENT:      Head: Normocephalic and atraumatic.      Right Ear: Tympanic membrane, ear canal and external ear normal.      Left Ear: Tympanic membrane, ear canal and external ear normal.      Nose: Nose normal. No mucosal edema or rhinorrhea.      Mouth/Throat:      Lips: Pink.      Mouth: Mucous membranes are moist.       Pharynx: Oropharynx is clear. Uvula midline.   Eyes:      General: Lids are normal. No scleral icterus.     Extraocular Movements: Extraocular movements intact.      Conjunctiva/sclera: Conjunctivae normal.      Pupils: Pupils are equal, round, and reactive to light.   Neck:      Thyroid: No thyromegaly.   Cardiovascular:      Rate and Rhythm: Normal rate and regular rhythm.      Pulses: Normal pulses.   Pulmonary:      Effort: Pulmonary effort is normal.      Breath sounds: Normal breath sounds. No wheezing, rhonchi or rales.   Musculoskeletal:         General: Normal range of motion.      Cervical back: Normal range of motion and neck supple.      Right lower leg: No edema.      Left lower leg: No edema.   Lymphadenopathy:      Cervical: No cervical adenopathy.   Skin:     General: Skin is warm and dry.      Findings: No lesion or rash.      Nails: There is no clubbing.   Neurological:      Mental Status: She is alert.      Cranial Nerves: No cranial nerve deficit.      Sensory: No sensory deficit.   Psychiatric:         Mood and Affect: Mood and affect normal.       Assessment:       1. Routine general medical examination at a health care facility    2. Ischemic cardiomyopathy    3. Chronic diastolic heart failure    4. Diffuse large B-cell lymphoma, unspecified body region    5. Stage 4 chronic kidney disease    6. Recurrent major depressive disorder, in full remission    7. Situational anxiety    8. Chemotherapy-induced neuropathy    9. Hyperparathyroidism    10. Immunosuppression due to drug therapy    11. Angina pectoris    12. Atherosclerosis of aorta    13. Calcified granuloma of lung    14. Essential hypertension    15. Mixed hyperlipidemia    16. Chronic anemia    17. Vitamin D deficiency    18. Hypothyroidism (acquired)    19. Anxiety    20. Chronic rhinitis    21. Need for influenza vaccination    22. Paroxysmal atrial fibrillation          Plan:   1. Routine general medical examination at a health care  facility    2. Ischemic cardiomyopathy  Overview:  Followed by Cardiology, continue current treatment plan       3. Chronic diastolic heart failure  Overview:  Followed by Cardiology, continue current treatment plan       4. Diffuse large B-cell lymphoma, unspecified body region  Overview:  Followed by Dr. Prescott, Hem/Onc, continue with current treatment plan       5. Stage 4 chronic kidney disease  Overview:  Followed by Nephrology, continue current treatment plan       6. Recurrent major depressive disorder, in full remission  Assessment & Plan:  Stable on Cymbalta and Zoloft      7. Situational anxiety  Assessment & Plan:  Stable on BusPar      8. Chemotherapy-induced neuropathy  Assessment & Plan:  Stable on Lyrica      9. Hyperparathyroidism  Overview:  Followed by Nephrology, continue current treatment plan       10. Immunosuppression due to drug therapy  Overview:  On Plaquenil      11. Angina pectoris  Overview:  Followed by Cardiology, continue current treatment plan       12. Atherosclerosis of aorta  Overview:  CXR 11/2021, on ASA and statin      13. Calcified granuloma of lung  Overview:  PET CT 2/21, right middle lobe, stable      14. Essential hypertension  Assessment & Plan:  /72, controlled, continue Lasix, metoprolol      15. Mixed hyperlipidemia  Assessment & Plan:  Controlled, continue pravastatin      16. Chronic anemia  Overview:  Followed by Hematology/Oncology, continue current treatment plan       17. Vitamin D deficiency  Assessment & Plan:  Continue supplement      18. Hypothyroidism (acquired)  Assessment & Plan:  Status pending lab, continue levothyroxine    Orders:  -     TSH; Future; Expected date: 01/19/2023  -     T4, Free; Future; Expected date: 01/19/2023    19. Anxiety  -     busPIRone (BUSPAR) 7.5 MG tablet; Take 1 tablet (7.5 mg total) by mouth 2 (two) times a day.  Dispense: 180 tablet; Refill: 3    20. Chronic rhinitis  -     azelastine (ASTELIN) 137 mcg (0.1 %) nasal  spray; 1 spray (137 mcg total) by Nasal route 2 (two) times daily.  Dispense: 30 mL; Refill: 0    21. Need for influenza vaccination    22. Paroxysmal atrial fibrillation  Overview:  Followed by Cardiology, continue current treatment plan       Other orders  -     Influenza - Quadrivalent (Adjuvanted)        Thyroid labs added to next lab draw  Declines COVID booster  DEXA scheduled  Flu today  6 month f/u with Dr. Coronel scheduled with fasting labs PTA   Health Maintenance reviewed/updated.

## 2023-01-31 ENCOUNTER — APPOINTMENT (OUTPATIENT)
Dept: RADIOLOGY | Facility: HOSPITAL | Age: 83
End: 2023-01-31
Attending: PHYSICIAN ASSISTANT
Payer: MEDICARE

## 2023-01-31 DIAGNOSIS — Z78.0 MENOPAUSE: ICD-10-CM

## 2023-01-31 PROCEDURE — 77080 DXA BONE DENSITY AXIAL: CPT | Mod: 26,HCNC,, | Performed by: RADIOLOGY

## 2023-01-31 PROCEDURE — 77080 DEXA BONE DENSITY SPINE HIP: ICD-10-PCS | Mod: 26,HCNC,, | Performed by: RADIOLOGY

## 2023-01-31 PROCEDURE — 77080 DXA BONE DENSITY AXIAL: CPT | Mod: TC,HCNC

## 2023-02-07 DIAGNOSIS — Z00.00 ENCOUNTER FOR MEDICARE ANNUAL WELLNESS EXAM: ICD-10-CM

## 2023-02-09 DIAGNOSIS — Z00.00 ENCOUNTER FOR MEDICARE ANNUAL WELLNESS EXAM: ICD-10-CM

## 2023-02-15 ENCOUNTER — OFFICE VISIT (OUTPATIENT)
Dept: HEMATOLOGY/ONCOLOGY | Facility: CLINIC | Age: 83
End: 2023-02-15
Payer: MEDICARE

## 2023-02-15 ENCOUNTER — LAB VISIT (OUTPATIENT)
Dept: LAB | Facility: HOSPITAL | Age: 83
End: 2023-02-15
Attending: INTERNAL MEDICINE
Payer: MEDICARE

## 2023-02-15 VITALS
WEIGHT: 144.19 LBS | DIASTOLIC BLOOD PRESSURE: 86 MMHG | HEIGHT: 64 IN | TEMPERATURE: 97 F | RESPIRATION RATE: 20 BRPM | SYSTOLIC BLOOD PRESSURE: 126 MMHG | BODY MASS INDEX: 24.62 KG/M2 | HEART RATE: 88 BPM

## 2023-02-15 DIAGNOSIS — Z98.84 S/P GASTRIC BYPASS: Chronic | ICD-10-CM

## 2023-02-15 DIAGNOSIS — C83.30 DIFFUSE LARGE B-CELL LYMPHOMA, UNSPECIFIED BODY REGION: ICD-10-CM

## 2023-02-15 DIAGNOSIS — D84.821 IMMUNOSUPPRESSION DUE TO DRUG THERAPY: ICD-10-CM

## 2023-02-15 DIAGNOSIS — D51.3 OTHER DIETARY VITAMIN B12 DEFICIENCY ANEMIA: ICD-10-CM

## 2023-02-15 DIAGNOSIS — I10 ESSENTIAL HYPERTENSION: Chronic | ICD-10-CM

## 2023-02-15 DIAGNOSIS — C83.30 DIFFUSE LARGE B-CELL LYMPHOMA, UNSPECIFIED BODY REGION: Primary | ICD-10-CM

## 2023-02-15 DIAGNOSIS — N18.32 STAGE 3B CHRONIC KIDNEY DISEASE: ICD-10-CM

## 2023-02-15 DIAGNOSIS — Z79.899 IMMUNOSUPPRESSION DUE TO DRUG THERAPY: ICD-10-CM

## 2023-02-15 PROBLEM — C85.83 LARGE CELL LYMPHOMA OF INTRA-ABDOMINAL LYMPH NODES: Chronic | Status: RESOLVED | Noted: 2017-08-17 | Resolved: 2023-02-15

## 2023-02-15 LAB
ALBUMIN SERPL BCP-MCNC: 3.8 G/DL (ref 3.5–5.2)
ALP SERPL-CCNC: 52 U/L (ref 55–135)
ALT SERPL W/O P-5'-P-CCNC: 24 U/L (ref 10–44)
ANION GAP SERPL CALC-SCNC: 10 MMOL/L (ref 8–16)
AST SERPL-CCNC: 32 U/L (ref 10–40)
BASOPHILS # BLD AUTO: 0.03 K/UL (ref 0–0.2)
BASOPHILS NFR BLD: 0.5 % (ref 0–1.9)
BILIRUB SERPL-MCNC: 0.4 MG/DL (ref 0.1–1)
BUN SERPL-MCNC: 29 MG/DL (ref 8–23)
CALCIUM SERPL-MCNC: 8.9 MG/DL (ref 8.7–10.5)
CHLORIDE SERPL-SCNC: 107 MMOL/L (ref 95–110)
CO2 SERPL-SCNC: 23 MMOL/L (ref 23–29)
CREAT SERPL-MCNC: 1.6 MG/DL (ref 0.5–1.4)
CRP SERPL-MCNC: 0.7 MG/L (ref 0–8.2)
DIFFERENTIAL METHOD: ABNORMAL
EOSINOPHIL # BLD AUTO: 0.2 K/UL (ref 0–0.5)
EOSINOPHIL NFR BLD: 2.3 % (ref 0–8)
ERYTHROCYTE [DISTWIDTH] IN BLOOD BY AUTOMATED COUNT: 17.8 % (ref 11.5–14.5)
EST. GFR  (NO RACE VARIABLE): 32 ML/MIN/1.73 M^2
FERRITIN SERPL-MCNC: 52 NG/ML (ref 20–300)
GLUCOSE SERPL-MCNC: 86 MG/DL (ref 70–110)
HCT VFR BLD AUTO: 32.2 % (ref 37–48.5)
HGB BLD-MCNC: 10.2 G/DL (ref 12–16)
IMM GRANULOCYTES # BLD AUTO: 0.01 K/UL (ref 0–0.04)
IMM GRANULOCYTES NFR BLD AUTO: 0.2 % (ref 0–0.5)
IRON SERPL-MCNC: 67 UG/DL (ref 30–160)
LDH SERPL L TO P-CCNC: 229 U/L (ref 110–260)
LYMPHOCYTES # BLD AUTO: 3.2 K/UL (ref 1–4.8)
LYMPHOCYTES NFR BLD: 48.7 % (ref 18–48)
MCH RBC QN AUTO: 32.5 PG (ref 27–31)
MCHC RBC AUTO-ENTMCNC: 31.7 G/DL (ref 32–36)
MCV RBC AUTO: 103 FL (ref 82–98)
MONOCYTES # BLD AUTO: 0.4 K/UL (ref 0.3–1)
MONOCYTES NFR BLD: 6.5 % (ref 4–15)
NEUTROPHILS # BLD AUTO: 2.8 K/UL (ref 1.8–7.7)
NEUTROPHILS NFR BLD: 41.8 % (ref 38–73)
NRBC BLD-RTO: 0 /100 WBC
PLATELET # BLD AUTO: 164 K/UL (ref 150–450)
PMV BLD AUTO: 9.3 FL (ref 9.2–12.9)
POTASSIUM SERPL-SCNC: 3.8 MMOL/L (ref 3.5–5.1)
PROT SERPL-MCNC: 7 G/DL (ref 6–8.4)
RBC # BLD AUTO: 3.14 M/UL (ref 4–5.4)
SATURATED IRON: 15 % (ref 20–50)
SODIUM SERPL-SCNC: 140 MMOL/L (ref 136–145)
TOTAL IRON BINDING CAPACITY: 438 UG/DL (ref 250–450)
TRANSFERRIN SERPL-MCNC: 296 MG/DL (ref 200–375)
WBC # BLD AUTO: 6.57 K/UL (ref 3.9–12.7)

## 2023-02-15 PROCEDURE — 1126F AMNT PAIN NOTED NONE PRSNT: CPT | Mod: HCNC,CPTII,S$GLB, | Performed by: INTERNAL MEDICINE

## 2023-02-15 PROCEDURE — 82607 VITAMIN B-12: CPT | Mod: HCNC | Performed by: INTERNAL MEDICINE

## 2023-02-15 PROCEDURE — 83615 LACTATE (LD) (LDH) ENZYME: CPT | Mod: HCNC | Performed by: INTERNAL MEDICINE

## 2023-02-15 PROCEDURE — 99999 PR PBB SHADOW E&M-EST. PATIENT-LVL III: ICD-10-PCS | Mod: PBBFAC,HCNC,, | Performed by: INTERNAL MEDICINE

## 2023-02-15 PROCEDURE — 86140 C-REACTIVE PROTEIN: CPT | Mod: HCNC | Performed by: INTERNAL MEDICINE

## 2023-02-15 PROCEDURE — 3079F PR MOST RECENT DIASTOLIC BLOOD PRESSURE 80-89 MM HG: ICD-10-PCS | Mod: HCNC,CPTII,S$GLB, | Performed by: INTERNAL MEDICINE

## 2023-02-15 PROCEDURE — 99214 PR OFFICE/OUTPT VISIT, EST, LEVL IV, 30-39 MIN: ICD-10-PCS | Mod: HCNC,S$GLB,, | Performed by: INTERNAL MEDICINE

## 2023-02-15 PROCEDURE — 84466 ASSAY OF TRANSFERRIN: CPT | Mod: HCNC | Performed by: INTERNAL MEDICINE

## 2023-02-15 PROCEDURE — 1160F RVW MEDS BY RX/DR IN RCRD: CPT | Mod: HCNC,CPTII,S$GLB, | Performed by: INTERNAL MEDICINE

## 2023-02-15 PROCEDURE — 1157F PR ADVANCE CARE PLAN OR EQUIV PRESENT IN MEDICAL RECORD: ICD-10-PCS | Mod: HCNC,CPTII,S$GLB, | Performed by: INTERNAL MEDICINE

## 2023-02-15 PROCEDURE — 85025 COMPLETE CBC W/AUTO DIFF WBC: CPT | Mod: HCNC | Performed by: INTERNAL MEDICINE

## 2023-02-15 PROCEDURE — 82728 ASSAY OF FERRITIN: CPT | Mod: HCNC | Performed by: INTERNAL MEDICINE

## 2023-02-15 PROCEDURE — 1126F PR PAIN SEVERITY QUANTIFIED, NO PAIN PRESENT: ICD-10-PCS | Mod: HCNC,CPTII,S$GLB, | Performed by: INTERNAL MEDICINE

## 2023-02-15 PROCEDURE — 1101F PR PT FALLS ASSESS DOC 0-1 FALLS W/OUT INJ PAST YR: ICD-10-PCS | Mod: HCNC,CPTII,S$GLB, | Performed by: INTERNAL MEDICINE

## 2023-02-15 PROCEDURE — 1101F PT FALLS ASSESS-DOCD LE1/YR: CPT | Mod: HCNC,CPTII,S$GLB, | Performed by: INTERNAL MEDICINE

## 2023-02-15 PROCEDURE — 3074F PR MOST RECENT SYSTOLIC BLOOD PRESSURE < 130 MM HG: ICD-10-PCS | Mod: HCNC,CPTII,S$GLB, | Performed by: INTERNAL MEDICINE

## 2023-02-15 PROCEDURE — 3074F SYST BP LT 130 MM HG: CPT | Mod: HCNC,CPTII,S$GLB, | Performed by: INTERNAL MEDICINE

## 2023-02-15 PROCEDURE — 36415 COLL VENOUS BLD VENIPUNCTURE: CPT | Mod: HCNC | Performed by: INTERNAL MEDICINE

## 2023-02-15 PROCEDURE — 3288F PR FALLS RISK ASSESSMENT DOCUMENTED: ICD-10-PCS | Mod: HCNC,CPTII,S$GLB, | Performed by: INTERNAL MEDICINE

## 2023-02-15 PROCEDURE — 1160F PR REVIEW ALL MEDS BY PRESCRIBER/CLIN PHARMACIST DOCUMENTED: ICD-10-PCS | Mod: HCNC,CPTII,S$GLB, | Performed by: INTERNAL MEDICINE

## 2023-02-15 PROCEDURE — 1159F MED LIST DOCD IN RCRD: CPT | Mod: HCNC,CPTII,S$GLB, | Performed by: INTERNAL MEDICINE

## 2023-02-15 PROCEDURE — 80053 COMPREHEN METABOLIC PANEL: CPT | Mod: HCNC | Performed by: INTERNAL MEDICINE

## 2023-02-15 PROCEDURE — 3288F FALL RISK ASSESSMENT DOCD: CPT | Mod: HCNC,CPTII,S$GLB, | Performed by: INTERNAL MEDICINE

## 2023-02-15 PROCEDURE — 3079F DIAST BP 80-89 MM HG: CPT | Mod: HCNC,CPTII,S$GLB, | Performed by: INTERNAL MEDICINE

## 2023-02-15 PROCEDURE — 1157F ADVNC CARE PLAN IN RCRD: CPT | Mod: HCNC,CPTII,S$GLB, | Performed by: INTERNAL MEDICINE

## 2023-02-15 PROCEDURE — 99999 PR PBB SHADOW E&M-EST. PATIENT-LVL III: CPT | Mod: PBBFAC,HCNC,, | Performed by: INTERNAL MEDICINE

## 2023-02-15 PROCEDURE — 1159F PR MEDICATION LIST DOCUMENTED IN MEDICAL RECORD: ICD-10-PCS | Mod: HCNC,CPTII,S$GLB, | Performed by: INTERNAL MEDICINE

## 2023-02-15 PROCEDURE — 99214 OFFICE O/P EST MOD 30 MIN: CPT | Mod: HCNC,S$GLB,, | Performed by: INTERNAL MEDICINE

## 2023-02-15 NOTE — PROGRESS NOTES
"ubjective:       Patient ID: Violet Swenson is a 83 y.o. female.    Chief Complaint: Results and Lymphoma    HPI:  83-year-old female history of diffuse large B-cell lymphoma 2017 patient returns for clinical follow-up.  Patient has done reasonably well also status post gastric bypass    Past Medical History:   Diagnosis Date    Age-related osteoporosis without current pathological fracture 8/20/2018    Anemia     Anxiety     Arthritis     Atrial flutter     Cancer     lymphoma Large cell B    CHF (congestive heart failure)     Chronic anemia 4/26/2017    Chronic midline low back pain with right-sided sciatica 8/20/2018    Coronary artery disease     01/2015 LHC patent LCX. 50% stenosis in LAD and RCA.      Depression     Disorder of kidney and ureter     Encounter for blood transfusion     GERD (gastroesophageal reflux disease)     Gout, arthritis     Heart failure     Hx of psychiatric care     Hyperlipidemia     Hypertension     Hypothyroidism     Immune deficiency disorder     Kidney disease     Lung nodule 2014    RML--stable    Obesity     Pacemaker     Metronic    Paroxysmal atrial fibrillation 3/15/2018    Pneumonia     Polyneuropathy     chemo induced    Psychiatric problem     Stroke 11/16/2020    Tobacco dependence     quit 1976    Trouble in sleeping     Unstable angina 11/27/2020     Family History   Problem Relation Age of Onset    Heart disease Mother     Hypertension Mother     Cataracts Mother     Stomach cancer Father         "ulcers that turned to cancer"    Cancer Father         stomach    Pancreatic cancer Sister     Cancer Sister         pancreatic    Leukemia Brother         "leukemia which led to intestinal cancer"    Cataracts Brother     Cancer Brother         leukemia then later stomach cancer    Drug abuse Son     Cancer Son         prostate cancer    Prostate cancer Son     COPD Daughter     Asthma Daughter     Hypertension " Maternal Grandfather     Stroke Maternal Grandfather     Alcohol abuse Neg Hx     Diabetes Neg Hx     Mental retardation Neg Hx     Mental illness Neg Hx      Social History     Socioeconomic History    Marital status:     Number of children: 4   Occupational History    Occupation: Retired   Tobacco Use    Smoking status: Former     Packs/day: 2.00     Years: 6.00     Pack years: 12.00     Types: Cigarettes     Quit date: 3/15/1976     Years since quittin.9    Smokeless tobacco: Never   Substance and Sexual Activity    Alcohol use: No     Alcohol/week: 0.0 standard drinks    Drug use: No    Sexual activity: Not Currently     Partners: Male   Social History Narrative    , lives with . She is a retired . 4 children (3 natural born, 1 adopted)- 2 ; 2x  (currently 17 years); son has prostate cancer, daughter COPD,  cardiac difficulty. She still drives. Does not have a Living will or Advanced Directive.     Past Surgical History:   Procedure Laterality Date    APPENDECTOMY   approx    CARDIAC PACEMAKER PLACEMENT  2015    CHOLECYSTECTOMY  1993    incidental at time of gastric bypass    COLON SURGERY Right 2017    hemicolectomy    COLONOSCOPY N/A 2017    Procedure: COLONOSCOPY;  Surgeon: Tye Enamorado MD;  Location: [facility];  Service: Endoscopy;  Laterality: N/A;    COLONOSCOPY N/A 2018    Procedure: COLONOSCOPY;  Surgeon: Saúl Arthur III, MD;  Location: [facility];  Service: Endoscopy;  Laterality: N/A;    CORONARY ANGIOPLASTY  2014    CORONARY STENT PLACEMENT  2014    ESOPHAGOGASTRODUODENOSCOPY N/A 2018    Procedure: EGD (ESOPHAGOGASTRODUODENOSCOPY);  Surgeon: Saúl Arthur III, MD;  Location: [facility];  Service: Endoscopy;  Laterality: N/A;    GASTRIC BYPASS      with incidental choly    HERNIA REPAIR      INJECTION OF ANESTHETIC AGENT INTO SACROILIAC JOINT Right 10/8/2020    Procedure:  Right BLOCK, SACROILIAC JOINT with RN IV sedation;  Surgeon: Madi Anton MD;  Location: [facility];  Service: Pain Management;  Laterality: Right;    JOINT REPLACEMENT Bilateral 2009    3 months apart    TONSILLECTOMY  1959       Labs:  Lab Results   Component Value Date    WBC 7.12 05/31/2022    HGB 11.3 (L) 05/31/2022    HCT 36.8 (L) 05/31/2022     (H) 05/31/2022     05/31/2022     BMP  Lab Results   Component Value Date     10/11/2022    K 5.7 (H) 10/11/2022     10/11/2022    CO2 18 (L) 10/11/2022    BUN 46 (H) 10/11/2022    CREATININE 1.8 (H) 10/11/2022    CALCIUM 10.0 10/11/2022    ANIONGAP 12 10/11/2022    ESTGFRAFRICA 35 (A) 05/31/2022    EGFRNONAA 30 (A) 05/31/2022     Lab Results   Component Value Date    ALT 77 (H) 10/11/2022    AST 72 (H) 10/11/2022    ALKPHOS 81 10/11/2022    BILITOT 0.3 10/11/2022       Lab Results   Component Value Date    IRON 111 05/31/2022    TIBC 450 05/31/2022    FERRITIN 48 05/31/2022     Lab Results   Component Value Date    OYMTZJVE11 >2000 (H) 11/07/2019     Lab Results   Component Value Date    FOLATE 18.1 11/07/2019     Lab Results   Component Value Date    TSH 8.254 (H) 03/30/2022         Review of Systems   Constitutional:  Positive for fatigue. Negative for activity change, appetite change, chills, diaphoresis, fever and unexpected weight change.   HENT:  Negative for congestion, dental problem, drooling, ear discharge, ear pain, facial swelling, hearing loss, mouth sores, nosebleeds, postnasal drip, rhinorrhea, sinus pressure, sneezing, sore throat, tinnitus, trouble swallowing and voice change.    Eyes:  Negative for photophobia, pain, discharge, redness, itching and visual disturbance.   Respiratory:  Negative for cough, choking, chest tightness, shortness of breath, wheezing and stridor.    Cardiovascular:  Negative for chest pain, palpitations and leg swelling.   Gastrointestinal:  Negative for abdominal distention, abdominal pain,  anal bleeding, blood in stool, constipation, diarrhea, nausea, rectal pain and vomiting.   Endocrine: Negative for cold intolerance, heat intolerance, polydipsia, polyphagia and polyuria.   Genitourinary:  Negative for decreased urine volume, difficulty urinating, dyspareunia, dysuria, enuresis, flank pain, frequency, genital sores, hematuria, menstrual problem, pelvic pain, urgency, vaginal bleeding, vaginal discharge and vaginal pain.   Musculoskeletal:  Negative for arthralgias, back pain, gait problem, joint swelling, myalgias, neck pain and neck stiffness.   Skin:  Negative for color change, pallor and rash.   Allergic/Immunologic: Negative for environmental allergies, food allergies and immunocompromised state.   Neurological:  Positive for weakness. Negative for dizziness, tremors, seizures, syncope, facial asymmetry, speech difficulty, light-headedness, numbness and headaches.   Hematological:  Negative for adenopathy. Does not bruise/bleed easily.   Psychiatric/Behavioral:  Positive for dysphoric mood. Negative for agitation, behavioral problems, confusion, decreased concentration, hallucinations, self-injury, sleep disturbance and suicidal ideas. The patient is nervous/anxious. The patient is not hyperactive.      Objective:      Physical Exam  Vitals reviewed.   Constitutional:       General: She is not in acute distress.     Appearance: She is well-developed. She is not diaphoretic.   HENT:      Head: Normocephalic and atraumatic.      Right Ear: External ear normal.      Left Ear: External ear normal.      Nose: Nose normal.      Right Sinus: No maxillary sinus tenderness or frontal sinus tenderness.      Left Sinus: No maxillary sinus tenderness or frontal sinus tenderness.      Mouth/Throat:      Pharynx: No oropharyngeal exudate.   Eyes:      General: Lids are normal. No scleral icterus.        Right eye: No discharge.         Left eye: No discharge.      Conjunctiva/sclera: Conjunctivae normal.       Right eye: Right conjunctiva is not injected. No hemorrhage.     Left eye: Left conjunctiva is not injected. No hemorrhage.     Pupils: Pupils are equal, round, and reactive to light.   Neck:      Thyroid: No thyromegaly.      Vascular: No JVD.      Trachea: No tracheal deviation.   Cardiovascular:      Rate and Rhythm: Normal rate.   Pulmonary:      Effort: Pulmonary effort is normal. No respiratory distress.      Breath sounds: No stridor.   Chest:      Chest wall: No tenderness.   Abdominal:      General: Bowel sounds are normal. There is no distension.      Palpations: Abdomen is soft. There is no hepatomegaly, splenomegaly or mass.      Tenderness: There is no abdominal tenderness. There is no rebound.   Musculoskeletal:         General: No tenderness. Normal range of motion.      Cervical back: Normal range of motion and neck supple.   Lymphadenopathy:      Cervical: No cervical adenopathy.      Upper Body:      Right upper body: No supraclavicular adenopathy.      Left upper body: No supraclavicular adenopathy.   Skin:     General: Skin is dry.      Findings: No erythema or rash.   Neurological:      Mental Status: She is alert and oriented to person, place, and time.      Cranial Nerves: No cranial nerve deficit.      Coordination: Coordination normal.   Psychiatric:         Behavior: Behavior normal.         Thought Content: Thought content normal.         Judgment: Judgment normal.           Assessment:      1. Diffuse large B-cell lymphoma, unspecified body region    2. Immunosuppression due to drug therapy    3. Stage 3b chronic kidney disease    4. Essential hypertension    5. S/P gastric bypass    6. Other dietary vitamin B12 deficiency anemia           Plan:     History of diffuse large B-cell lymphoma.  Patient is somewhat reluctant to not be seen in follow-up.  At this time would recommend that patient have baseline laboratory studies today again in 9 months.  Communicate results through electronic  patient portal discussed implications with her no imaging is warranted at this point should symptomatology recur obviously imaging can be      Med Onc Chart Routing      Follow up with physician . RTC with me with standing labs in 9 months   Follow up with DEVIN    Infusion scheduling note    Injection scheduling note    Labs CBC, CMP, iron and TIBC, vitamin B12, ferritin and LDH   Lab interval:  CRP   Imaging    Pharmacy appointment    Other referrals         Ken Armando Jr, MD FACP

## 2023-02-16 ENCOUNTER — PATIENT MESSAGE (OUTPATIENT)
Dept: HEMATOLOGY/ONCOLOGY | Facility: HOSPITAL | Age: 83
End: 2023-02-16
Payer: MEDICARE

## 2023-02-16 ENCOUNTER — TELEPHONE (OUTPATIENT)
Dept: HEMATOLOGY/ONCOLOGY | Facility: CLINIC | Age: 83
End: 2023-02-16
Payer: MEDICARE

## 2023-02-16 LAB — VIT B12 SERPL-MCNC: >2000 PG/ML (ref 210–950)

## 2023-02-16 NOTE — TELEPHONE ENCOUNTER
Your slightly iron deficient this is happen to you in the past we will need to discuss iron supplementation.  Possible upper lower endoscopies the last ones were done in 2018.  I will either set up a video visit with you get you to see 1 of the nurse practitioners [facility] is in the office to review

## 2023-02-16 NOTE — TELEPHONE ENCOUNTER
Attempted to contact patient to schedule visit to discuss iron deficiency. No answer, left voicemail and call back number.

## 2023-03-29 ENCOUNTER — TELEPHONE (OUTPATIENT)
Dept: NEPHROLOGY | Facility: CLINIC | Age: 83
End: 2023-03-29
Payer: MEDICARE

## 2023-03-29 RX ORDER — CALCITRIOL 0.25 UG/1
CAPSULE ORAL
Qty: 12 CAPSULE | Refills: 0 | Status: SHIPPED | OUTPATIENT
Start: 2023-03-29 | End: 2023-08-23 | Stop reason: SDUPTHER

## 2023-03-29 NOTE — TELEPHONE ENCOUNTER
Pt last seen by Nephrology 11/2021. Calcitriol refill requested. Filled 30 days but needs follow up.

## 2023-04-03 ENCOUNTER — OFFICE VISIT (OUTPATIENT)
Dept: HEPATOLOGY | Facility: CLINIC | Age: 83
End: 2023-04-03
Payer: MEDICARE

## 2023-04-03 ENCOUNTER — TELEPHONE (OUTPATIENT)
Dept: HEPATOLOGY | Facility: CLINIC | Age: 83
End: 2023-04-03
Payer: MEDICARE

## 2023-04-03 ENCOUNTER — LAB VISIT (OUTPATIENT)
Dept: LAB | Facility: HOSPITAL | Age: 83
End: 2023-04-03
Attending: INTERNAL MEDICINE
Payer: MEDICARE

## 2023-04-03 VITALS
DIASTOLIC BLOOD PRESSURE: 78 MMHG | WEIGHT: 146.81 LBS | HEIGHT: 64 IN | BODY MASS INDEX: 25.06 KG/M2 | HEART RATE: 89 BPM | SYSTOLIC BLOOD PRESSURE: 126 MMHG

## 2023-04-03 DIAGNOSIS — R79.89 ABNORMAL LFTS: Primary | ICD-10-CM

## 2023-04-03 DIAGNOSIS — R79.1 ABNORMAL COAGULATION PROFILE: ICD-10-CM

## 2023-04-03 DIAGNOSIS — R79.89 ABNORMAL LFTS: ICD-10-CM

## 2023-04-03 LAB
ERYTHROCYTE [DISTWIDTH] IN BLOOD BY AUTOMATED COUNT: 18 % (ref 11.5–14.5)
HCT VFR BLD AUTO: 33.7 % (ref 37–48.5)
HGB BLD-MCNC: 10.4 G/DL (ref 12–16)
INR PPP: 1 (ref 0.8–1.2)
MCH RBC QN AUTO: 32.3 PG (ref 27–31)
MCHC RBC AUTO-ENTMCNC: 30.9 G/DL (ref 32–36)
MCV RBC AUTO: 105 FL (ref 82–98)
PLATELET # BLD AUTO: 233 K/UL (ref 150–450)
PMV BLD AUTO: 10.6 FL (ref 9.2–12.9)
PROTHROMBIN TIME: 10.6 SEC (ref 9–12.5)
RBC # BLD AUTO: 3.22 M/UL (ref 4–5.4)
WBC # BLD AUTO: 6.04 K/UL (ref 3.9–12.7)

## 2023-04-03 PROCEDURE — 99205 OFFICE O/P NEW HI 60 MIN: CPT | Mod: HCNC,S$GLB,, | Performed by: INTERNAL MEDICINE

## 2023-04-03 PROCEDURE — 1159F PR MEDICATION LIST DOCUMENTED IN MEDICAL RECORD: ICD-10-PCS | Mod: HCNC,CPTII,S$GLB, | Performed by: INTERNAL MEDICINE

## 2023-04-03 PROCEDURE — 1159F MED LIST DOCD IN RCRD: CPT | Mod: HCNC,CPTII,S$GLB, | Performed by: INTERNAL MEDICINE

## 2023-04-03 PROCEDURE — 3074F SYST BP LT 130 MM HG: CPT | Mod: HCNC,CPTII,S$GLB, | Performed by: INTERNAL MEDICINE

## 2023-04-03 PROCEDURE — 1157F ADVNC CARE PLAN IN RCRD: CPT | Mod: HCNC,CPTII,S$GLB, | Performed by: INTERNAL MEDICINE

## 2023-04-03 PROCEDURE — 1160F RVW MEDS BY RX/DR IN RCRD: CPT | Mod: HCNC,CPTII,S$GLB, | Performed by: INTERNAL MEDICINE

## 2023-04-03 PROCEDURE — 3074F PR MOST RECENT SYSTOLIC BLOOD PRESSURE < 130 MM HG: ICD-10-PCS | Mod: HCNC,CPTII,S$GLB, | Performed by: INTERNAL MEDICINE

## 2023-04-03 PROCEDURE — 99999 PR PBB SHADOW E&M-EST. PATIENT-LVL V: CPT | Mod: PBBFAC,HCNC,, | Performed by: INTERNAL MEDICINE

## 2023-04-03 PROCEDURE — 85027 COMPLETE CBC AUTOMATED: CPT | Mod: HCNC | Performed by: INTERNAL MEDICINE

## 2023-04-03 PROCEDURE — 36415 COLL VENOUS BLD VENIPUNCTURE: CPT | Mod: HCNC | Performed by: INTERNAL MEDICINE

## 2023-04-03 PROCEDURE — 1157F PR ADVANCE CARE PLAN OR EQUIV PRESENT IN MEDICAL RECORD: ICD-10-PCS | Mod: HCNC,CPTII,S$GLB, | Performed by: INTERNAL MEDICINE

## 2023-04-03 PROCEDURE — 3078F DIAST BP <80 MM HG: CPT | Mod: HCNC,CPTII,S$GLB, | Performed by: INTERNAL MEDICINE

## 2023-04-03 PROCEDURE — 3078F PR MOST RECENT DIASTOLIC BLOOD PRESSURE < 80 MM HG: ICD-10-PCS | Mod: HCNC,CPTII,S$GLB, | Performed by: INTERNAL MEDICINE

## 2023-04-03 PROCEDURE — 1126F AMNT PAIN NOTED NONE PRSNT: CPT | Mod: HCNC,CPTII,S$GLB, | Performed by: INTERNAL MEDICINE

## 2023-04-03 PROCEDURE — 99999 PR PBB SHADOW E&M-EST. PATIENT-LVL V: ICD-10-PCS | Mod: PBBFAC,HCNC,, | Performed by: INTERNAL MEDICINE

## 2023-04-03 PROCEDURE — 99205 PR OFFICE/OUTPT VISIT, NEW, LEVL V, 60-74 MIN: ICD-10-PCS | Mod: HCNC,S$GLB,, | Performed by: INTERNAL MEDICINE

## 2023-04-03 PROCEDURE — 85610 PROTHROMBIN TIME: CPT | Mod: HCNC | Performed by: INTERNAL MEDICINE

## 2023-04-03 PROCEDURE — 80053 COMPREHEN METABOLIC PANEL: CPT | Mod: HCNC | Performed by: INTERNAL MEDICINE

## 2023-04-03 PROCEDURE — 1160F PR REVIEW ALL MEDS BY PRESCRIBER/CLIN PHARMACIST DOCUMENTED: ICD-10-PCS | Mod: HCNC,CPTII,S$GLB, | Performed by: INTERNAL MEDICINE

## 2023-04-03 PROCEDURE — 1126F PR PAIN SEVERITY QUANTIFIED, NO PAIN PRESENT: ICD-10-PCS | Mod: HCNC,CPTII,S$GLB, | Performed by: INTERNAL MEDICINE

## 2023-04-03 NOTE — TELEPHONE ENCOUNTER
Returned patient's call, she's demanding to see Dr. Morales as soon as possible. Patient was last seen in 2018 I informed her that she would be considered a new patient. I offered her the first available 6/14/23. She declined so I gave her an appointment with Dr. Azevedo today at 3:30.

## 2023-04-03 NOTE — PROGRESS NOTES
Subjective:     Violet Swenson is here for evaluation of abnormal LFTs    History of Present Illness:  Violet Swenson is a 83-year-old female with history of B-cell lymphoma that was diagnosed and treated around 2018, has had abnormal liver transaminases since 2015, both less than twice the upper limit of normal.  She has had a liver biopsy in 2018 that showed nonspecific findings with no fibrosis and followed by Dr. Morales briefly.    She emergently presented to the clinic today as her daughter helped her to come here by searching [employer] that symptoms she is having are secondary to her liver disease.  These symptoms include fluid retention, her ankles appear to be holt, skin is dry, she is noticing bruises, feels fatigue and forgetfulness     Review of Systems    Objective:     Physical Exam  Vitals reviewed.   Constitutional:       Appearance: Normal appearance.   Eyes:      General: No scleral icterus.  Pulmonary:      Effort: Pulmonary effort is normal.   Abdominal:      General: Bowel sounds are normal. There is no distension.      Palpations: There is no mass.      Tenderness: There is no abdominal tenderness.   Musculoskeletal:      Right lower leg: No edema.      Left lower leg: No edema.      Comments: Mild edema <1+ mainly around her ankles    Skin:     Coloration: Skin is not jaundiced.   Neurological:      Mental Status: She is alert and oriented to person, place, and time.   Psychiatric:         Thought Content: Thought content normal.       Computed MELD-Na score unavailable. Necessary lab results were not found in the last year.  Computed MELD score unavailable. Necessary lab results were not found in the last year.    WBC   Date Value Ref Range Status   02/15/2023 6.57 3.90 - 12.70 K/uL Final     Hemoglobin   Date Value Ref Range Status   02/15/2023 10.2 (L) 12.0 - 16.0 g/dL Final     POC Hematocrit   Date Value Ref Range Status   04/07/2017 35 (L) 36 - 54 %PCV Final      Hematocrit   Date Value Ref Range Status   02/15/2023 32.2 (L) 37.0 - 48.5 % Final     Platelets   Date Value Ref Range Status   02/15/2023 164 150 - 450 K/uL Final     BUN   Date Value Ref Range Status   02/15/2023 29 (H) 8 - 23 mg/dL Final     Creatinine   Date Value Ref Range Status   02/15/2023 1.6 (H) 0.5 - 1.4 mg/dL Final     Glucose   Date Value Ref Range Status   02/15/2023 86 70 - 110 mg/dL Final     Calcium   Date Value Ref Range Status   02/15/2023 8.9 8.7 - 10.5 mg/dL Final     Sodium   Date Value Ref Range Status   02/15/2023 140 136 - 145 mmol/L Final     Potassium   Date Value Ref Range Status   02/15/2023 3.8 3.5 - 5.1 mmol/L Final     Chloride   Date Value Ref Range Status   02/15/2023 107 95 - 110 mmol/L Final     Magnesium   Date Value Ref Range Status   11/06/2021 1.7 1.6 - 2.6 mg/dL Final     AST   Date Value Ref Range Status   02/15/2023 32 10 - 40 U/L Final     ALT   Date Value Ref Range Status   02/15/2023 24 10 - 44 U/L Final     Alkaline Phosphatase   Date Value Ref Range Status   02/15/2023 52 (L) 55 - 135 U/L Final     Total Bilirubin   Date Value Ref Range Status   02/15/2023 0.4 0.1 - 1.0 mg/dL Final     Comment:     For infants and newborns, interpretation of results should be based  on gestational age, weight and in agreement with clinical  observations.    Premature Infant recommended reference ranges:  Up to 24 hours.............<8.0 mg/dL  Up to 48 hours............<12.0 mg/dL  3-5 days..................<15.0 mg/dL  6-29 days.................<15.0 mg/dL       Albumin   Date Value Ref Range Status   02/15/2023 3.8 3.5 - 5.2 g/dL Final     INR   Date Value Ref Range Status   11/07/2021 0.9 0.8 - 1.2 Final     Comment:     Coumadin Therapy:  2.0 - 3.0 for INR for all indicators except mechanical heart valves  and antiphospholipid syndromes which should use 2.5 - 3.5.           Assessment/Plan:     1.Abnormal LFTs:  She continued to have abnormal fluctuating transaminases but  always less than 100 even after she had liver biopsy which was nonspecific with no fibrosis.  Her presenting symptoms are vague, could be age related however will obtain current labs, current imaging and a FibroScan to assess scar tissue progression.  I requested her to follow-up with her PCP for fluid retention, as she also has chronic kidney disease and also need to rule out cardiac etiology    Liver biopsy - 2018  FINAL PATHOLOGIC DIAGNOSIS Liver, random, biopsy: - Minimal nonspecific portal inflammation with macrophages present. - Mild zone 3 sinusoidal dilatation. - Sinusoidal lymphocytes, hematopathology consult pending, results will be issued in an addendum. - Fibrous expansion of portal tracts, stage 1 (of 4). - Ductular reactioin and ductular metaplasia of periportal hepatocytes.     I have reviewed existing labs, imaging, procedures. Educated patient about disease process, prognosis. Ordered required labs, images and discussed treatment plan.     Jyoti Azevedo MD  Transplant Hepatologist  Dept of Hepatology, [place] [facility]

## 2023-04-03 NOTE — TELEPHONE ENCOUNTER
----- Message from Latasha Marshall sent at 4/3/2023  1:52 PM CDT -----  Contact: patient Violet [phone]  Patient called requesting a call back from the doctor that took Dahiana Morales's place or the nurse to schedule an appt with the [facility]

## 2023-04-04 LAB
ALBUMIN SERPL BCP-MCNC: 3.7 G/DL (ref 3.5–5.2)
ALP SERPL-CCNC: 51 U/L (ref 55–135)
ALT SERPL W/O P-5'-P-CCNC: 30 U/L (ref 10–44)
ANION GAP SERPL CALC-SCNC: 7 MMOL/L (ref 8–16)
AST SERPL-CCNC: 38 U/L (ref 10–40)
BILIRUB SERPL-MCNC: 0.6 MG/DL (ref 0.1–1)
BUN SERPL-MCNC: 35 MG/DL (ref 8–23)
CALCIUM SERPL-MCNC: 9.3 MG/DL (ref 8.7–10.5)
CHLORIDE SERPL-SCNC: 110 MMOL/L (ref 95–110)
CO2 SERPL-SCNC: 24 MMOL/L (ref 23–29)
CREAT SERPL-MCNC: 1.5 MG/DL (ref 0.5–1.4)
EST. GFR  (NO RACE VARIABLE): 34.6 ML/MIN/1.73 M^2
GLUCOSE SERPL-MCNC: 73 MG/DL (ref 70–110)
POTASSIUM SERPL-SCNC: 5 MMOL/L (ref 3.5–5.1)
PROT SERPL-MCNC: 6.7 G/DL (ref 6–8.4)
SODIUM SERPL-SCNC: 141 MMOL/L (ref 136–145)

## 2023-04-17 ENCOUNTER — TELEPHONE (OUTPATIENT)
Dept: NEPHROLOGY | Facility: CLINIC | Age: 83
End: 2023-04-17
Payer: MEDICARE

## 2023-04-17 DIAGNOSIS — N18.32 STAGE 3B CHRONIC KIDNEY DISEASE: Primary | ICD-10-CM

## 2023-04-17 NOTE — TELEPHONE ENCOUNTER
----- Message from Jackelin Lowe sent at 4/17/2023 11:22 AM CDT -----  Contact: pt  Type:  Sooner Apoointment Request    Caller is requesting a sooner appointment.  Caller declined first available appointment listed below.  Caller will not accept being placed on the waitlist and is requesting a message be sent to doctor.  Name of Caller: pt  When is the first available appointment? End of the year  Symptoms: feet/swollen, per liver dr was told it could be her kidneys  Would the patient rather a call back or a response via [facility]?  phone  Best Call Back Number: [phone]  Additional Information:

## 2023-04-18 ENCOUNTER — HOSPITAL ENCOUNTER (OUTPATIENT)
Dept: RADIOLOGY | Facility: HOSPITAL | Age: 83
Discharge: HOME | End: 2023-04-18
Attending: INTERNAL MEDICINE
Payer: MEDICARE

## 2023-04-18 ENCOUNTER — PROCEDURE VISIT (OUTPATIENT)
Dept: HEPATOLOGY | Facility: CLINIC | Age: 83
End: 2023-04-18
Attending: INTERNAL MEDICINE
Payer: MEDICARE

## 2023-04-18 VITALS
WEIGHT: 145.06 LBS | BODY MASS INDEX: 24.77 KG/M2 | DIASTOLIC BLOOD PRESSURE: 78 MMHG | HEIGHT: 64 IN | HEART RATE: 90 BPM | SYSTOLIC BLOOD PRESSURE: 126 MMHG

## 2023-04-18 DIAGNOSIS — R79.89 ABNORMAL LFTS: ICD-10-CM

## 2023-04-18 PROCEDURE — 76705 US ABDOMEN LIMITED: ICD-10-PCS | Mod: 26,HCNC,, | Performed by: RADIOLOGY

## 2023-04-18 PROCEDURE — 76705 ECHO EXAM OF ABDOMEN: CPT | Mod: TC,HCNC

## 2023-04-18 PROCEDURE — 76705 ECHO EXAM OF ABDOMEN: CPT | Mod: 26,HCNC,, | Performed by: RADIOLOGY

## 2023-04-18 NOTE — PROGRESS NOTES
Patient presented in clinic today for fibroscan examination of their liver. Patient verbalized presence of a cardiac pacemaker. Fibroscan appointment canceled. Informed patient would notify ordering provider.    KRISTIAN GravesN, RN   Registered Nurse Lead- Hepatology   [facility]

## 2023-04-19 ENCOUNTER — INFUSION (OUTPATIENT)
Dept: INFUSION THERAPY | Facility: HOSPITAL | Age: 83
End: 2023-04-19
Attending: INTERNAL MEDICINE
Payer: MEDICARE

## 2023-04-19 ENCOUNTER — OFFICE VISIT (OUTPATIENT)
Dept: RHEUMATOLOGY | Facility: CLINIC | Age: 83
End: 2023-04-19
Payer: MEDICARE

## 2023-04-19 ENCOUNTER — LAB VISIT (OUTPATIENT)
Dept: LAB | Facility: HOSPITAL | Age: 83
End: 2023-04-19
Attending: INTERNAL MEDICINE
Payer: MEDICARE

## 2023-04-19 VITALS
HEIGHT: 64 IN | BODY MASS INDEX: 24.88 KG/M2 | DIASTOLIC BLOOD PRESSURE: 87 MMHG | WEIGHT: 145.75 LBS | HEART RATE: 78 BPM | SYSTOLIC BLOOD PRESSURE: 137 MMHG

## 2023-04-19 DIAGNOSIS — M81.0 AGE-RELATED OSTEOPOROSIS WITHOUT CURRENT PATHOLOGICAL FRACTURE: Primary | ICD-10-CM

## 2023-04-19 DIAGNOSIS — M05.79 RHEUMATOID ARTHRITIS INVOLVING MULTIPLE SITES WITH POSITIVE RHEUMATOID FACTOR: ICD-10-CM

## 2023-04-19 DIAGNOSIS — M1A.09X0 IDIOPATHIC CHRONIC GOUT OF MULTIPLE SITES WITHOUT TOPHUS: ICD-10-CM

## 2023-04-19 DIAGNOSIS — R76.8 CENTROMERE ANTIBODY POSITIVE: ICD-10-CM

## 2023-04-19 DIAGNOSIS — M81.0 AGE-RELATED OSTEOPOROSIS WITHOUT CURRENT PATHOLOGICAL FRACTURE: ICD-10-CM

## 2023-04-19 DIAGNOSIS — M1A.09X0 IDIOPATHIC CHRONIC GOUT, MULTIPLE SITES, WITHOUT TOPHUS (TOPHI): ICD-10-CM

## 2023-04-19 DIAGNOSIS — N18.32 STAGE 3B CHRONIC KIDNEY DISEASE: ICD-10-CM

## 2023-04-19 DIAGNOSIS — E03.9 HYPOTHYROIDISM (ACQUIRED): Chronic | ICD-10-CM

## 2023-04-19 DIAGNOSIS — R60.0 PEDAL EDEMA: ICD-10-CM

## 2023-04-19 PROBLEM — M06.4 UNDIFFERENTIATED INFLAMMATORY ARTHRITIS: Status: ACTIVE | Noted: 2023-04-19

## 2023-04-19 PROBLEM — M19.90 UNDIFFERENTIATED INFLAMMATORY ARTHRITIS: Status: ACTIVE | Noted: 2023-04-19

## 2023-04-19 PROBLEM — M25.572 CHRONIC PAIN OF LEFT ANKLE: Status: ACTIVE | Noted: 2023-04-19

## 2023-04-19 PROBLEM — G89.29 CHRONIC PAIN OF LEFT ANKLE: Status: ACTIVE | Noted: 2023-04-19

## 2023-04-19 LAB
ALBUMIN SERPL BCP-MCNC: 3.7 G/DL (ref 3.5–5.2)
ALP SERPL-CCNC: 55 U/L (ref 55–135)
ALT SERPL W/O P-5'-P-CCNC: 27 U/L (ref 10–44)
ANION GAP SERPL CALC-SCNC: 7 MMOL/L (ref 8–16)
AST SERPL-CCNC: 27 U/L (ref 10–40)
BILIRUB SERPL-MCNC: 0.3 MG/DL (ref 0.1–1)
BUN SERPL-MCNC: 43 MG/DL (ref 8–23)
CALCIUM SERPL-MCNC: 9.2 MG/DL (ref 8.7–10.5)
CHLORIDE SERPL-SCNC: 113 MMOL/L (ref 95–110)
CO2 SERPL-SCNC: 20 MMOL/L (ref 23–29)
CREAT SERPL-MCNC: 1.7 MG/DL (ref 0.5–1.4)
EST. GFR  (NO RACE VARIABLE): 30 ML/MIN/1.73 M^2
GLUCOSE SERPL-MCNC: 84 MG/DL (ref 70–110)
POTASSIUM SERPL-SCNC: 3.9 MMOL/L (ref 3.5–5.1)
PROT SERPL-MCNC: 6.6 G/DL (ref 6–8.4)
SODIUM SERPL-SCNC: 140 MMOL/L (ref 136–145)
T4 FREE SERPL-MCNC: 0.69 NG/DL (ref 0.71–1.51)
TSH SERPL DL<=0.005 MIU/L-ACNC: 9.41 UIU/ML (ref 0.4–4)

## 2023-04-19 PROCEDURE — 1125F PR PAIN SEVERITY QUANTIFIED, PAIN PRESENT: ICD-10-PCS | Mod: HCNC,CPTII,S$GLB, | Performed by: INTERNAL MEDICINE

## 2023-04-19 PROCEDURE — 3079F PR MOST RECENT DIASTOLIC BLOOD PRESSURE 80-89 MM HG: ICD-10-PCS | Mod: HCNC,CPTII,S$GLB, | Performed by: INTERNAL MEDICINE

## 2023-04-19 PROCEDURE — 3288F PR FALLS RISK ASSESSMENT DOCUMENTED: ICD-10-PCS | Mod: HCNC,CPTII,S$GLB, | Performed by: INTERNAL MEDICINE

## 2023-04-19 PROCEDURE — 1159F PR MEDICATION LIST DOCUMENTED IN MEDICAL RECORD: ICD-10-PCS | Mod: HCNC,CPTII,S$GLB, | Performed by: INTERNAL MEDICINE

## 2023-04-19 PROCEDURE — 3075F SYST BP GE 130 - 139MM HG: CPT | Mod: HCNC,CPTII,S$GLB, | Performed by: INTERNAL MEDICINE

## 2023-04-19 PROCEDURE — 1101F PR PT FALLS ASSESS DOC 0-1 FALLS W/OUT INJ PAST YR: ICD-10-PCS | Mod: HCNC,CPTII,S$GLB, | Performed by: INTERNAL MEDICINE

## 2023-04-19 PROCEDURE — 84439 ASSAY OF FREE THYROXINE: CPT | Mod: HCNC | Performed by: PHYSICIAN ASSISTANT

## 2023-04-19 PROCEDURE — 99999 PR PBB SHADOW E&M-EST. PATIENT-LVL III: ICD-10-PCS | Mod: PBBFAC,HCNC,, | Performed by: INTERNAL MEDICINE

## 2023-04-19 PROCEDURE — 36415 COLL VENOUS BLD VENIPUNCTURE: CPT | Mod: HCNC | Performed by: PHYSICIAN ASSISTANT

## 2023-04-19 PROCEDURE — 1157F ADVNC CARE PLAN IN RCRD: CPT | Mod: HCNC,CPTII,S$GLB, | Performed by: INTERNAL MEDICINE

## 2023-04-19 PROCEDURE — 1157F PR ADVANCE CARE PLAN OR EQUIV PRESENT IN MEDICAL RECORD: ICD-10-PCS | Mod: HCNC,CPTII,S$GLB, | Performed by: INTERNAL MEDICINE

## 2023-04-19 PROCEDURE — 1160F PR REVIEW ALL MEDS BY PRESCRIBER/CLIN PHARMACIST DOCUMENTED: ICD-10-PCS | Mod: HCNC,CPTII,S$GLB, | Performed by: INTERNAL MEDICINE

## 2023-04-19 PROCEDURE — 1159F MED LIST DOCD IN RCRD: CPT | Mod: HCNC,CPTII,S$GLB, | Performed by: INTERNAL MEDICINE

## 2023-04-19 PROCEDURE — 99215 PR OFFICE/OUTPT VISIT, EST, LEVL V, 40-54 MIN: ICD-10-PCS | Mod: HCNC,S$GLB,, | Performed by: INTERNAL MEDICINE

## 2023-04-19 PROCEDURE — 3288F FALL RISK ASSESSMENT DOCD: CPT | Mod: HCNC,CPTII,S$GLB, | Performed by: INTERNAL MEDICINE

## 2023-04-19 PROCEDURE — 99215 OFFICE O/P EST HI 40 MIN: CPT | Mod: HCNC,S$GLB,, | Performed by: INTERNAL MEDICINE

## 2023-04-19 PROCEDURE — 3079F DIAST BP 80-89 MM HG: CPT | Mod: HCNC,CPTII,S$GLB, | Performed by: INTERNAL MEDICINE

## 2023-04-19 PROCEDURE — 1160F RVW MEDS BY RX/DR IN RCRD: CPT | Mod: HCNC,CPTII,S$GLB, | Performed by: INTERNAL MEDICINE

## 2023-04-19 PROCEDURE — 99999 PR PBB SHADOW E&M-EST. PATIENT-LVL III: CPT | Mod: PBBFAC,HCNC,, | Performed by: INTERNAL MEDICINE

## 2023-04-19 PROCEDURE — 80053 COMPREHEN METABOLIC PANEL: CPT | Mod: HCNC | Performed by: INTERNAL MEDICINE

## 2023-04-19 PROCEDURE — 84443 ASSAY THYROID STIM HORMONE: CPT | Mod: HCNC | Performed by: PHYSICIAN ASSISTANT

## 2023-04-19 PROCEDURE — 3075F PR MOST RECENT SYSTOLIC BLOOD PRESS GE 130-139MM HG: ICD-10-PCS | Mod: HCNC,CPTII,S$GLB, | Performed by: INTERNAL MEDICINE

## 2023-04-19 PROCEDURE — 1125F AMNT PAIN NOTED PAIN PRSNT: CPT | Mod: HCNC,CPTII,S$GLB, | Performed by: INTERNAL MEDICINE

## 2023-04-19 PROCEDURE — 96372 THER/PROPH/DIAG INJ SC/IM: CPT | Mod: HCNC

## 2023-04-19 PROCEDURE — 1101F PT FALLS ASSESS-DOCD LE1/YR: CPT | Mod: HCNC,CPTII,S$GLB, | Performed by: INTERNAL MEDICINE

## 2023-04-19 PROCEDURE — 63600175 PHARM REV CODE 636 W HCPCS: Mod: JZ,JG,HCNC | Performed by: INTERNAL MEDICINE

## 2023-04-19 RX ORDER — METHYLPREDNISOLONE 4 MG/1
TABLET ORAL
Qty: 21 TABLET | Refills: 0 | Status: SHIPPED | OUTPATIENT
Start: 2023-04-19 | End: 2023-12-19

## 2023-04-19 RX ADMIN — DENOSUMAB 60 MG: 60 INJECTION SUBCUTANEOUS at 11:04

## 2023-04-19 NOTE — PROGRESS NOTES
RHEUMATOLOGY CLINIC FOLLOW UP VISIT  Chief complaints, HPI, ROS, EXAM, Assessment & Plans:-  Violet Zhao a 83 y.o. pleasant female comes in for follow-up visit.  She follows in the Rheumatology Clinic for osteoporosis and gout.  Last visit she had low-grade inflammatory arthritis of bilateral hands.  Serologies showed positive rheumatoid factor and anticentromere antibody.  No history of Raynaud's, calcinosis, esophageal dysfunction.  She was started on Plaquenil for low-grade seropositive rheumatoid.  She denies any significant pain over small joints of bilateral hands.  Complains of worsening bilateral leg swelling.  Was evaluated by her liver specialist and scheduled to see her nephrologist tomorrow for her chronic kidney disease.  No falls or fragility fractures since last visit.  Rheumatological review of system negative otherwise.  Physical examination shows pitting edema bilateral lower extremities.  No synovitis of small joints..    1. Age-related osteoporosis without current pathological fracture    2. Idiopathic chronic gout, multiple sites, without tophus (tophi)    3. Pedal edema    4. Centromere antibody positive    5. Rheumatoid arthritis involving multiple sites with positive rheumatoid factor      Problem List Items Addressed This Visit       Idiopathic chronic gout, multiple sites, without tophus (tophi)    Relevant Medications    methylPREDNISolone (MEDROL DOSEPACK) 4 mg tablet    Age-related osteoporosis without current pathological fracture - Primary    Pedal edema    Centromere antibody positive    Rheumatoid arthritis involving multiple sites with positive rheumatoid factor    Relevant Medications    methylPREDNISolone (MEDROL DOSEPACK) 4 mg tablet       Labs reviewed today:-   Latest Reference Range & Units Most Recent   MARÍA ELENA Screen Negative <1:80  Positive !  10/24/22 11:15   MARÍA ELENA Titer 1  1:640  10/24/22 11:15   MARÍA ELENA PATTERN 1   Centromere  10/24/22 11:15   ds DNA Ab Negative 1:10  Negative 1:10  10/24/22 11:15   Anti-SSA Antibody 0.00 - 0.99 Ratio 0.04  10/24/22 11:15   Anti-SSA Interpretation Negative  Negative  10/24/22 11:15   Anti-SSB Antibody 0.00 - 0.99 Ratio 0.04  10/24/22 11:15   Anti-SSB Interpretation Negative  Negative  10/24/22 11:15   Anti Sm Antibody 0.00 - 0.99 Ratio 0.05  10/24/22 11:15   Anti-Sm Interpretation Negative  Negative  10/24/22 11:15   Anti Sm/RNP Antibody 0.00 - 0.99 Ratio 0.04  10/24/22 11:15   Anti-Sm/RNP Interpretation Negative  Negative  10/24/22 11:15   Smooth Muscle Ab Negative  Negative 1:40  2/21/18 15:45   Anti-Mitochon Ab IFA Negative  Negative 1:40  2/21/18 15:45   Interpretation  No clonal abnormality was apparent.  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)  5/15/17 13:29   Method  Culture without mitogens  5/15/17 13:29   IgG 650 - 1600 mg/dL 731  2/21/18 15:45   IgM 50 - 300 mg/dL 114  2/21/18 15:45   IgA 40 - 350 mg/dL 110  2/21/18 15:45   Protein, Serum 6.0 - 8.4 g/dL 6.1  12/7/20 11:10   Albumin grams/dl 3.35 - 5.55 g/dL 3.07 (L)  12/7/20 11:10   Alpha-1 grams/dl 0.17 - 0.41 g/dL 0.52 (H)  12/7/20 11:10   Alpha-2 0.43 - 0.99 g/dL 0.81  12/7/20 11:10   Beta 0.50 - 1.10 g/dL 0.66  12/7/20 11:10   Gamma 0.67 - 1.58 g/dL 1.02  12/7/20 11:10   Pathologist Interpretation SPE  REVIEWED  12/7/20 11:10   Pathologist Interpretation ANGELIA  REVIEWED  12/14/17 09:45   [place] Free Light Chains 0.33 - 1.94 mg/dL 4.67 (H)  12/14/17 09:45   Lambda Free Light Chains 0.57 - 2.63 mg/dL 3.86 (H)  12/14/17 09:45   Kappa/Lambda FLC Ratio 0.26 - 1.65  1.21  12/14/17 09:45   Immunofix Interp.  SEE COMMENT  12/14/17 09:45   CCP Antibodies <5.0 U/mL <0.5  10/24/22 11:15   Rheumatoid Factor 0.0 - 15.0 IU/mL 18.0 (H)  10/24/22 11:15   !: Data is abnormal  (L): Data is abnormally low  (H): Data is abnormally high  (C):  Corrected     Latest Reference Range & Units 04/03/23 16:19   WBC 3.90 - 12.70 K/uL 6.04   RBC 4.00 - 5.40 M/uL 3.22 (L)   Hemoglobin 12.0 - 16.0 g/dL 10.4 (L)   Hematocrit 37.0 - 48.5 % 33.7 (L)   MCV 82 - 98 fL 105 (H)   MCH 27.0 - 31.0 pg 32.3 (H)   MCHC 32.0 - 36.0 g/dL 30.9 (L)   RDW 11.5 - 14.5 % 18.0 (H)   Platelets 150 - 450 K/uL 233   MPV 9.2 - 12.9 fL 10.6   Protime 9.0 - 12.5 sec 10.6   INR 0.8 - 1.2  1.0   Sodium 136 - 145 mmol/L 141   Potassium 3.5 - 5.1 mmol/L 5.0   Chloride 95 - 110 mmol/L 110   CO2 23 - 29 mmol/L 24   Anion Gap 8 - 16 mmol/L 7 (L)   BUN 8 - 23 mg/dL 35 (H)   Creatinine 0.5 - 1.4 mg/dL 1.5 (H)   eGFR >60 mL/min/1.73 m^2 34.6 !   Glucose 70 - 110 mg/dL 73   Calcium 8.7 - 10.5 mg/dL 9.3   Alkaline Phosphatase 55 - 135 U/L 51 (L)   PROTEIN TOTAL 6.0 - 8.4 g/dL 6.7   Albumin 3.5 - 5.2 g/dL 3.7   BILIRUBIN TOTAL 0.1 - 1.0 mg/dL 0.6   AST 10 - 40 U/L 38   ALT 10 - 44 U/L 30   (L): Data is abnormally low  (H): Data is abnormally high  !: Data is abnormal    Severe osteoporosis on Prolia.  High risk for hypocalcemia.  Prolia today and repeat CMP in 10 days.  Low-grade seropositive rheumatoid on Plaquenil-renally dosed at once daily.  Continue the same.  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  Monitor.  Yearly Plaquenil retinal clearance by ophthalmologist advised.  Worsening pitting pedal edema bilateral lower extremities.  Multifactorial-chronic kidney disease, congestive heart failure.  Advised compression stockings.  Consult nephrologist and cardiologist.  I have explained all of the above in detail and the patient understands all of the current recommendation(s). I have answered all questions to the best of my ability and to their complete satisfaction.     Total time spent 40 minutes including time needed to review the records, the   patient evaluation, documentation, face-to-face discussion with the patient,   more than 50% of the time was spent on  coordination of care and counseling.    Level V visit.  # Follow up in about 6 months (around 10/19/2023).      Disclaimer: This note was prepared using voice recognition system and is likely to have sound alike errors and is not proof read.  Please call me with any questions.

## 2023-04-20 ENCOUNTER — OFFICE VISIT (OUTPATIENT)
Dept: NEPHROLOGY | Facility: CLINIC | Age: 83
End: 2023-04-20
Payer: MEDICARE

## 2023-04-20 VITALS
WEIGHT: 143.5 LBS | DIASTOLIC BLOOD PRESSURE: 80 MMHG | BODY MASS INDEX: 24.5 KG/M2 | HEIGHT: 64 IN | SYSTOLIC BLOOD PRESSURE: 132 MMHG | HEART RATE: 66 BPM

## 2023-04-20 DIAGNOSIS — N18.32 STAGE 3B CHRONIC KIDNEY DISEASE: Primary | ICD-10-CM

## 2023-04-20 DIAGNOSIS — E87.20 METABOLIC ACIDOSIS: ICD-10-CM

## 2023-04-20 DIAGNOSIS — I10 PRIMARY HYPERTENSION: ICD-10-CM

## 2023-04-20 PROCEDURE — 99999 PR PBB SHADOW E&M-EST. PATIENT-LVL IV: CPT | Mod: PBBFAC,HCNC,, | Performed by: INTERNAL MEDICINE

## 2023-04-20 PROCEDURE — 3075F PR MOST RECENT SYSTOLIC BLOOD PRESS GE 130-139MM HG: ICD-10-PCS | Mod: HCNC,CPTII,S$GLB, | Performed by: INTERNAL MEDICINE

## 2023-04-20 PROCEDURE — 3288F PR FALLS RISK ASSESSMENT DOCUMENTED: ICD-10-PCS | Mod: HCNC,CPTII,S$GLB, | Performed by: INTERNAL MEDICINE

## 2023-04-20 PROCEDURE — 1100F PTFALLS ASSESS-DOCD GE2>/YR: CPT | Mod: HCNC,CPTII,S$GLB, | Performed by: INTERNAL MEDICINE

## 2023-04-20 PROCEDURE — 1160F RVW MEDS BY RX/DR IN RCRD: CPT | Mod: HCNC,CPTII,S$GLB, | Performed by: INTERNAL MEDICINE

## 2023-04-20 PROCEDURE — 1157F PR ADVANCE CARE PLAN OR EQUIV PRESENT IN MEDICAL RECORD: ICD-10-PCS | Mod: HCNC,CPTII,S$GLB, | Performed by: INTERNAL MEDICINE

## 2023-04-20 PROCEDURE — 1160F PR REVIEW ALL MEDS BY PRESCRIBER/CLIN PHARMACIST DOCUMENTED: ICD-10-PCS | Mod: HCNC,CPTII,S$GLB, | Performed by: INTERNAL MEDICINE

## 2023-04-20 PROCEDURE — 3079F PR MOST RECENT DIASTOLIC BLOOD PRESSURE 80-89 MM HG: ICD-10-PCS | Mod: HCNC,CPTII,S$GLB, | Performed by: INTERNAL MEDICINE

## 2023-04-20 PROCEDURE — 1159F PR MEDICATION LIST DOCUMENTED IN MEDICAL RECORD: ICD-10-PCS | Mod: HCNC,CPTII,S$GLB, | Performed by: INTERNAL MEDICINE

## 2023-04-20 PROCEDURE — 1157F ADVNC CARE PLAN IN RCRD: CPT | Mod: HCNC,CPTII,S$GLB, | Performed by: INTERNAL MEDICINE

## 2023-04-20 PROCEDURE — 99215 OFFICE O/P EST HI 40 MIN: CPT | Mod: HCNC,S$GLB,, | Performed by: INTERNAL MEDICINE

## 2023-04-20 PROCEDURE — 1159F MED LIST DOCD IN RCRD: CPT | Mod: HCNC,CPTII,S$GLB, | Performed by: INTERNAL MEDICINE

## 2023-04-20 PROCEDURE — 99215 PR OFFICE/OUTPT VISIT, EST, LEVL V, 40-54 MIN: ICD-10-PCS | Mod: HCNC,S$GLB,, | Performed by: INTERNAL MEDICINE

## 2023-04-20 PROCEDURE — 99999 PR PBB SHADOW E&M-EST. PATIENT-LVL IV: ICD-10-PCS | Mod: PBBFAC,HCNC,, | Performed by: INTERNAL MEDICINE

## 2023-04-20 PROCEDURE — 3075F SYST BP GE 130 - 139MM HG: CPT | Mod: HCNC,CPTII,S$GLB, | Performed by: INTERNAL MEDICINE

## 2023-04-20 PROCEDURE — 1126F PR PAIN SEVERITY QUANTIFIED, NO PAIN PRESENT: ICD-10-PCS | Mod: HCNC,CPTII,S$GLB, | Performed by: INTERNAL MEDICINE

## 2023-04-20 PROCEDURE — 3288F FALL RISK ASSESSMENT DOCD: CPT | Mod: HCNC,CPTII,S$GLB, | Performed by: INTERNAL MEDICINE

## 2023-04-20 PROCEDURE — 1126F AMNT PAIN NOTED NONE PRSNT: CPT | Mod: HCNC,CPTII,S$GLB, | Performed by: INTERNAL MEDICINE

## 2023-04-20 PROCEDURE — 1100F PR PT FALLS ASSESS DOC 2+ FALLS/FALL W/INJURY/YR: ICD-10-PCS | Mod: HCNC,CPTII,S$GLB, | Performed by: INTERNAL MEDICINE

## 2023-04-20 PROCEDURE — 3079F DIAST BP 80-89 MM HG: CPT | Mod: HCNC,CPTII,S$GLB, | Performed by: INTERNAL MEDICINE

## 2023-04-20 NOTE — PROGRESS NOTES
"Subjective:       Patient ID: Violet Swenson is a 83 y.o. female.    Chief Complaint: Chronic Kidney Disease    HPI    She presents to clinic today for routine follow-up.  Over the past 2 weeks she is noted swelling in her feet bilaterally associated with her feet turning blue and being quite painful.  She is not changed her diet.  There are no new medications or over-the-counter medications.  She has history of gout however she does not associate this discomfort with symptoms of her gouty flares.  She was taken off of Lyrica recently by her rheumatologist out of concern that it could be causing her feet swelling.  She is noted no change since discontinuing the medication.  She has no shortness of breath, PND, or orthopnea.  No chest discomfort related.  No symptoms of claudication related.  I encouraged her to follow-up with her cardiologist for evaluation.  Her laboratory studies and medications were reviewed.  All Nephrology related questions were answered to her satisfaction.      Review of Systems   Constitutional: Negative.    HENT: Negative.     Eyes: Negative.    Respiratory: Negative.     Cardiovascular:  Positive for leg swelling.   Gastrointestinal: Negative.    Musculoskeletal:         Pain in her feet bilaterally.   Neurological: Negative.        /80   Pulse 66   Ht 5' 4" (1.626 m)   Wt 65.1 kg (143 lb 8.3 oz)   BMI 24.64 kg/m²     Lab Results   Component Value Date    WBC 6.04 04/03/2023    HGB 10.4 (L) 04/03/2023    HCT 33.7 (L) 04/03/2023     (H) 04/03/2023     04/03/2023      BMP  Lab Results   Component Value Date     04/19/2023    K 3.9 04/19/2023     (H) 04/19/2023    CO2 20 (L) 04/19/2023    BUN 43 (H) 04/19/2023    CREATININE 1.7 (H) 04/19/2023    CALCIUM 9.2 04/19/2023    ANIONGAP 7 (L) 04/19/2023    ESTGFRAFRICA 35 (A) 05/31/2022    EGFRNONAA 30 (A) 05/31/2022     CMP  Sodium   Date Value Ref Range Status   04/19/2023 140 136 - 145 mmol/L Final "     Potassium   Date Value Ref Range Status   04/19/2023 3.9 3.5 - 5.1 mmol/L Final     Chloride   Date Value Ref Range Status   04/19/2023 113 (H) 95 - 110 mmol/L Final     CO2   Date Value Ref Range Status   04/19/2023 20 (L) 23 - 29 mmol/L Final     Glucose   Date Value Ref Range Status   04/19/2023 84 70 - 110 mg/dL Final     BUN   Date Value Ref Range Status   04/19/2023 43 (H) 8 - 23 mg/dL Final     Creatinine   Date Value Ref Range Status   04/19/2023 1.7 (H) 0.5 - 1.4 mg/dL Final     Calcium   Date Value Ref Range Status   04/19/2023 9.2 8.7 - 10.5 mg/dL Final     Total Protein   Date Value Ref Range Status   04/19/2023 6.6 6.0 - 8.4 g/dL Final     Albumin   Date Value Ref Range Status   04/19/2023 3.7 3.5 - 5.2 g/dL Final     Total Bilirubin   Date Value Ref Range Status   04/19/2023 0.3 0.1 - 1.0 mg/dL Final     Comment:     For infants and newborns, interpretation of results should be based  on gestational age, weight and in agreement with clinical  observations.    Premature Infant recommended reference ranges:  Up to 24 hours.............<8.0 mg/dL  Up to 48 hours............<12.0 mg/dL  3-5 days..................<15.0 mg/dL  6-29 days.................<15.0 mg/dL       Alkaline Phosphatase   Date Value Ref Range Status   04/19/2023 55 55 - 135 U/L Final     AST   Date Value Ref Range Status   04/19/2023 27 10 - 40 U/L Final     ALT   Date Value Ref Range Status   04/19/2023 27 10 - 44 U/L Final     Anion Gap   Date Value Ref Range Status   04/19/2023 7 (L) 8 - 16 mmol/L Final     eGFR if    Date Value Ref Range Status   05/31/2022 35 (A) >60 mL/min/1.73 m^2 Final     eGFR if non    Date Value Ref Range Status   05/31/2022 30 (A) >60 mL/min/1.73 m^2 Final     Comment:     Calculation used to obtain the estimated glomerular filtration  rate (eGFR) is the CKD-EPI equation.        Current Outpatient Medications on File Prior to Visit   Medication Sig Dispense Refill     allopurinoL (ZYLOPRIM) 300 MG tablet Take 1 tablet (300 mg total) by mouth once daily. 90 tablet 11    ascorbic acid, vitamin C, (VITAMIN C) 100 MG tablet Take by mouth once daily.      aspirin (ECOTRIN) 81 MG EC tablet Take 81 mg by mouth nightly.       azelastine (ASTELIN) 137 mcg (0.1 %) nasal spray 1 spray (137 mcg total) by Nasal route 2 (two) times daily. 30 mL 0    busPIRone (BUSPAR) 7.5 MG tablet Take 1 tablet (7.5 mg total) by mouth 2 (two) times a day. 180 tablet 3    calcitRIOL (ROCALTROL) 0.25 MCG Cap TAKE ONE CAPSULE BY MOUTH EVERY MONDAY, WEDNESDAY AND FRIDAY 12 capsule 0    clopidogreL (PLAVIX) 75 mg tablet TAKE ONE TABLET BY MOUTH EVERY DAY 30 tablet 6    diclofenac sodium 1 % Gel Apply 2 g topically once daily. Apply 2 g over painful joints once or twice a day. 100 g 6    DIPRIVAN 10 mg/mL infusion       DULoxetine (CYMBALTA) 30 MG capsule TAKE ONE CAPSULE BY MOUTH EVERY DAY 30 capsule 11    ELIQUIS 2.5 mg Tab Take 1 tablet (2.5 mg total) by mouth 2 (two) times daily. 180 tablet 3    ergocalciferol (ERGOCALCIFEROL) 50,000 unit Cap Take 1 capsule (50,000 Units total) by mouth every 7 days. 12 capsule 3    fluticasone propionate (FLONASE) 50 mcg/actuation nasal spray 2 sprays (100 mcg total) by Each Nostril route once daily. 16 g 11    furosemide (LASIX) 40 MG tablet Take 1 tablet (40 mg total) by mouth once daily. 90 tablet 3    hydrOXYchloroQUINE (PLAQUENIL) 200 mg tablet Take 1 tablet (200 mg total) by mouth once daily. 90 tablet 3    iron-vitamin C 100-250 mg, ICAR-C, 100-250 mg Tab TAKE ONE TABLET BY MOUTH EVERY DAY 30 tablet 4    levocetirizine (XYZAL) 5 MG tablet Take 1 tablet (5 mg total) by mouth every evening. 30 tablet 11    levothyroxine (SYNTHROID) 75 MCG tablet Take 1 tablet (75 mcg total) by mouth before breakfast. 90 tablet 3    methylPREDNISolone (MEDROL DOSEPACK) 4 mg tablet use as directed 21 tablet 0    metoprolol tartrate (LOPRESSOR) 25 MG tablet TAKE ONE TABLET BY MOUTH TWICE  DAILY 60 tablet 6    multivitamin (THERAGRAN) per tablet Take 1 tablet by mouth once daily.      pravastatin (PRAVACHOL) 40 MG tablet TAKE ONE TABLET BY MOUTH ONCE DAILY 90 tablet 3    sertraline (ZOLOFT) 100 MG tablet Take 1.5 tablets (150 mg total) by mouth once daily. 135 tablet 3    sodium bicarbonate 650 MG tablet Take 1 tablet (650 mg total) by mouth 2 (two) times daily. 60 tablet 11    ZINC ACETATE ORAL Take 250 mg by mouth once daily.       nitroGLYCERIN 0.2 mg/hr TD PT24 (NITRODUR) 0.2 mg/hr Place 1 patch onto the skin once daily. 30 patch 11    [DISCONTINUED] mirtazapine (REMERON SOL-TAB) 15 MG disintegrating tablet DISSOLVE 1 TABLET ON THE TONGUE AT BEDTIME FOR INSOMNIA (Patient not taking: Reported on 4/19/2023) 30 tablet 3    [DISCONTINUED] traMADoL (ULTRAM) 50 mg tablet Take 1 tablet (50 mg total) by mouth every 8 (eight) hours as needed for Pain. (Patient not taking: Reported on 4/19/2023) 12 tablet 0     Current Facility-Administered Medications on File Prior to Visit   Medication Dose Route Frequency Provider Last Rate Last Admin    denosumab (PROLIA) injection 60 mg  60 mg Subcutaneous Q6 Months Dilshad Rogers MD   60 mg at 08/29/18 1032            Objective:            Physical Exam  Constitutional:       Appearance: Normal appearance.   HENT:      Head: Normocephalic and atraumatic.   Eyes:      General: No scleral icterus.     Extraocular Movements: Extraocular movements intact.      Pupils: Pupils are equal, round, and reactive to light.   Cardiovascular:      Rate and Rhythm: Normal rate and regular rhythm.      Heart sounds: Murmur heard.   Pulmonary:      Effort: Pulmonary effort is normal.      Breath sounds: Normal breath sounds.   Musculoskeletal:      Right lower leg: No edema.      Left lower leg: No edema.   Skin:     General: Skin is warm and dry.   Neurological:      General: No focal deficit present.      Mental Status: She is alert and oriented to person, place, and time.    Psychiatric:         Mood and Affect: Mood normal.         Behavior: Behavior normal.       Assessment:       1. Stage 3b chronic kidney disease    2. Primary hypertension    3. Metabolic acidosis        Plan:       1. Creatinine has remained stable ranging between 1.5 and 1.8 for the past year.  EGFR is essentially normal for her age.  Electrolytes are stable.    At this point I do not think that her symptomatology is related to her kidneys.  I have encouraged her to follow-up with her cardiologist for vascular evaluation.  Additionally I advised her that if her pain worsens, swelling worsens or if there are any concerning changes that she should go to the emergency department for evaluation.  She verbalized understanding.    2. Blood pressure is well controlled on current regimen.    3. Bicarbonate is stable at 20.  She continues on oral replacement therapy.  She has been on sodium bicarbonate replacement for 4-6 months.  Though sodium bicarbonate can cause worsening lower extremity edema it would be odd to develop suddenly.  Additionally would be more generalized and not strictly isolated to her feet bilaterally.     If we are unable to find a cause we discussed the possibility of discontinuing her sodium bicarbonate replacement therapy.      Approximately 40 minutes was spent in face-to-face conversation and chart review.      Ander Goetz MD     Never

## 2023-04-21 ENCOUNTER — OFFICE VISIT (OUTPATIENT)
Dept: CARDIOLOGY | Facility: CLINIC | Age: 83
End: 2023-04-21
Payer: MEDICARE

## 2023-04-21 VITALS — SYSTOLIC BLOOD PRESSURE: 138 MMHG | DIASTOLIC BLOOD PRESSURE: 78 MMHG

## 2023-04-21 DIAGNOSIS — I48.0 PAROXYSMAL ATRIAL FIBRILLATION: ICD-10-CM

## 2023-04-21 DIAGNOSIS — E78.2 MIXED HYPERLIPIDEMIA: Chronic | ICD-10-CM

## 2023-04-21 DIAGNOSIS — I50.32 CHRONIC DIASTOLIC HEART FAILURE: ICD-10-CM

## 2023-04-21 DIAGNOSIS — I10 ESSENTIAL HYPERTENSION: Chronic | ICD-10-CM

## 2023-04-21 DIAGNOSIS — R60.0 PEDAL EDEMA: ICD-10-CM

## 2023-04-21 DIAGNOSIS — I25.10 CORONARY ARTERY DISEASE INVOLVING NATIVE CORONARY ARTERY OF NATIVE HEART WITHOUT ANGINA PECTORIS: Chronic | ICD-10-CM

## 2023-04-21 DIAGNOSIS — I70.0 ATHEROSCLEROSIS OF AORTA: ICD-10-CM

## 2023-04-21 DIAGNOSIS — Z95.0 CARDIAC PACEMAKER IN SITU: ICD-10-CM

## 2023-04-21 DIAGNOSIS — I25.5 ISCHEMIC CARDIOMYOPATHY: Chronic | ICD-10-CM

## 2023-04-21 DIAGNOSIS — I20.9 ANGINA PECTORIS: Primary | ICD-10-CM

## 2023-04-21 DIAGNOSIS — I95.1 POSTURAL HYPOTENSION: ICD-10-CM

## 2023-04-21 PROCEDURE — 3075F PR MOST RECENT SYSTOLIC BLOOD PRESS GE 130-139MM HG: ICD-10-PCS | Mod: HCNC,CPTII,S$GLB, | Performed by: INTERNAL MEDICINE

## 2023-04-21 PROCEDURE — 99999 PR PBB SHADOW E&M-EST. PATIENT-LVL I: CPT | Mod: PBBFAC,HCNC,, | Performed by: INTERNAL MEDICINE

## 2023-04-21 PROCEDURE — 1157F ADVNC CARE PLAN IN RCRD: CPT | Mod: HCNC,CPTII,S$GLB, | Performed by: INTERNAL MEDICINE

## 2023-04-21 PROCEDURE — 99999 PR PBB SHADOW E&M-EST. PATIENT-LVL I: ICD-10-PCS | Mod: PBBFAC,HCNC,, | Performed by: INTERNAL MEDICINE

## 2023-04-21 PROCEDURE — 1159F MED LIST DOCD IN RCRD: CPT | Mod: HCNC,CPTII,S$GLB, | Performed by: INTERNAL MEDICINE

## 2023-04-21 PROCEDURE — 1160F RVW MEDS BY RX/DR IN RCRD: CPT | Mod: HCNC,CPTII,S$GLB, | Performed by: INTERNAL MEDICINE

## 2023-04-21 PROCEDURE — 1157F PR ADVANCE CARE PLAN OR EQUIV PRESENT IN MEDICAL RECORD: ICD-10-PCS | Mod: HCNC,CPTII,S$GLB, | Performed by: INTERNAL MEDICINE

## 2023-04-21 PROCEDURE — 99214 OFFICE O/P EST MOD 30 MIN: CPT | Mod: HCNC,S$GLB,, | Performed by: INTERNAL MEDICINE

## 2023-04-21 PROCEDURE — 1159F PR MEDICATION LIST DOCUMENTED IN MEDICAL RECORD: ICD-10-PCS | Mod: HCNC,CPTII,S$GLB, | Performed by: INTERNAL MEDICINE

## 2023-04-21 PROCEDURE — 3075F SYST BP GE 130 - 139MM HG: CPT | Mod: HCNC,CPTII,S$GLB, | Performed by: INTERNAL MEDICINE

## 2023-04-21 PROCEDURE — 99214 PR OFFICE/OUTPT VISIT, EST, LEVL IV, 30-39 MIN: ICD-10-PCS | Mod: HCNC,S$GLB,, | Performed by: INTERNAL MEDICINE

## 2023-04-21 PROCEDURE — 3078F DIAST BP <80 MM HG: CPT | Mod: HCNC,CPTII,S$GLB, | Performed by: INTERNAL MEDICINE

## 2023-04-21 PROCEDURE — 1160F PR REVIEW ALL MEDS BY PRESCRIBER/CLIN PHARMACIST DOCUMENTED: ICD-10-PCS | Mod: HCNC,CPTII,S$GLB, | Performed by: INTERNAL MEDICINE

## 2023-04-21 PROCEDURE — 3078F PR MOST RECENT DIASTOLIC BLOOD PRESSURE < 80 MM HG: ICD-10-PCS | Mod: HCNC,CPTII,S$GLB, | Performed by: INTERNAL MEDICINE

## 2023-04-21 RX ORDER — FUROSEMIDE 40 MG/1
40 TABLET ORAL 2 TIMES DAILY
Qty: 60 TABLET | Refills: 11 | Status: ON HOLD | OUTPATIENT
Start: 2023-04-21 | End: 2023-06-29 | Stop reason: SDUPTHER

## 2023-04-21 NOTE — PROGRESS NOTES
"Subjective:   Patient ID:  Violet Swenson is a 83 y.o. female who presents for follow-up of No chief complaint on file.  Pt of Dr. Gil  Pt c/o of periorbital edema, LE edema and some weight.  Patient denies CP, angina or anginal equivalent.   Echo 2021 nml lv function  BP OK at home 130s/80s  Congestive Heart Failure  Presents for follow-up visit. Associated symptoms include edema. Pertinent negatives include no chest pain, muscle weakness, palpitations or shortness of breath. The symptoms have been stable. Compliance with total regimen is %. Compliance with diet is %. Compliance with exercise is %. Compliance with medications is %.   Atrial Fibrillation  Presents for follow-up visit. Symptoms are negative for bradycardia, chest pain, dizziness, palpitations, shortness of breath, syncope, tachycardia and weakness. The symptoms have been stable. Past medical history includes atrial fibrillation and CHF. There are no medication compliance problems.     Review of Systems   Constitutional: Negative. Negative for weight gain.   HENT: Negative.     Eyes: Negative.    Cardiovascular: Negative.  Negative for chest pain, leg swelling, palpitations and syncope.   Respiratory: Negative.  Negative for shortness of breath.    Endocrine: Negative.    Hematologic/Lymphatic: Negative.    Skin: Negative.    Musculoskeletal:  Negative for muscle weakness.   Gastrointestinal: Negative.    Genitourinary: Negative.    Neurological: Negative.  Negative for dizziness and weakness.   Psychiatric/Behavioral: Negative.     Allergic/Immunologic: Negative.    All other systems reviewed and are negative.  Family History   Problem Relation Age of Onset    Heart disease Mother     Hypertension Mother     Cataracts Mother     Stomach cancer Father         "ulcers that turned to cancer"    Cancer Father         stomach    Pancreatic cancer Sister     Cancer Sister         pancreatic    Leukemia Brother         " ""leukemia which led to intestinal cancer"    Cataracts Brother     Cancer Brother         leukemia then later stomach cancer    Drug abuse Son     Cancer Son         prostate cancer    Prostate cancer Son     COPD Daughter     Asthma Daughter     Hypertension Maternal Grandfather     Stroke Maternal Grandfather     Alcohol abuse Neg Hx     Diabetes Neg Hx     Mental retardation Neg Hx     Mental illness Neg Hx      Past Medical History:   Diagnosis Date    Age-related osteoporosis without current pathological fracture 2018    Anemia     Anxiety     Arthritis     Atrial flutter     Cancer     lymphoma Large cell B    CHF (congestive heart failure)     Chronic anemia 2017    Chronic midline low back pain with right-sided sciatica 2018    Coronary artery disease     2015 LHC patent LCX. 50% stenosis in LAD and RCA.      Depression     Disorder of kidney and ureter     Encounter for blood transfusion     GERD (gastroesophageal reflux disease)     Gout, arthritis     Heart failure     Hx of psychiatric care     Hyperlipidemia     Hypertension     Hypothyroidism     Immune deficiency disorder     Kidney disease     Lung nodule     RML--stable    Obesity     Pacemaker     Metronic    Paroxysmal atrial fibrillation 3/15/2018    Pneumonia     Polyneuropathy     chemo induced    Psychiatric problem     Rheumatoid arthritis involving multiple sites with positive rheumatoid factor 2023    Stroke 2020    Tobacco dependence     quit     Trouble in sleeping     Unstable angina 2020     Social History     Socioeconomic History    Marital status:     Number of children: 4   Occupational History    Occupation: Retired   Tobacco Use    Smoking status: Former     Packs/day: 2.00     Years: 6.00     Pack years: 12.00     Types: Cigarettes     Quit date: 3/15/1976     Years since quittin.1    Smokeless tobacco: Never   Substance and Sexual Activity    Alcohol use: No     " Alcohol/week: 0.0 standard drinks    Drug use: No    Sexual activity: Not Currently     Partners: Male   Social History Narrative    , lives with . She is a retired . 4 children (3 natural born, 1 adopted)- 2 ; 2x  (currently 17 years); son has prostate cancer, daughter COPD,  cardiac difficulty. She still drives. Does not have a Living will or Advanced Directive.     Current Outpatient Medications on File Prior to Visit   Medication Sig Dispense Refill    allopurinoL (ZYLOPRIM) 300 MG tablet Take 1 tablet (300 mg total) by mouth once daily. 90 tablet 11    ascorbic acid, vitamin C, (VITAMIN C) 100 MG tablet Take by mouth once daily.      aspirin (ECOTRIN) 81 MG EC tablet Take 81 mg by mouth nightly.       azelastine (ASTELIN) 137 mcg (0.1 %) nasal spray 1 spray (137 mcg total) by Nasal route 2 (two) times daily. 30 mL 0    busPIRone (BUSPAR) 7.5 MG tablet Take 1 tablet (7.5 mg total) by mouth 2 (two) times a day. 180 tablet 3    calcitRIOL (ROCALTROL) 0.25 MCG Cap TAKE ONE CAPSULE BY MOUTH EVERY MONDAY, WEDNESDAY AND FRIDAY 12 capsule 0    clopidogreL (PLAVIX) 75 mg tablet TAKE ONE TABLET BY MOUTH EVERY DAY 30 tablet 6    diclofenac sodium 1 % Gel Apply 2 g topically once daily. Apply 2 g over painful joints once or twice a day. 100 g 6    DIPRIVAN 10 mg/mL infusion       DULoxetine (CYMBALTA) 30 MG capsule TAKE ONE CAPSULE BY MOUTH EVERY DAY 30 capsule 11    ELIQUIS 2.5 mg Tab Take 1 tablet (2.5 mg total) by mouth 2 (two) times daily. 180 tablet 3    ergocalciferol (ERGOCALCIFEROL) 50,000 unit Cap Take 1 capsule (50,000 Units total) by mouth every 7 days. 12 capsule 3    fluticasone propionate (FLONASE) 50 mcg/actuation nasal spray 2 sprays (100 mcg total) by Each Nostril route once daily. 16 g 11    furosemide (LASIX) 40 MG tablet Take 1 tablet (40 mg total) by mouth once daily. 90 tablet 3    hydrOXYchloroQUINE (PLAQUENIL) 200 mg tablet Take 1 tablet (200 mg total)  by mouth once daily. 90 tablet 3    iron-vitamin C 100-250 mg, ICAR-C, 100-250 mg Tab TAKE ONE TABLET BY MOUTH EVERY DAY 30 tablet 4    levocetirizine (XYZAL) 5 MG tablet Take 1 tablet (5 mg total) by mouth every evening. 30 tablet 11    levothyroxine (SYNTHROID) 75 MCG tablet Take 1 tablet (75 mcg total) by mouth before breakfast. 90 tablet 3    methylPREDNISolone (MEDROL DOSEPACK) 4 mg tablet use as directed 21 tablet 0    metoprolol tartrate (LOPRESSOR) 25 MG tablet TAKE ONE TABLET BY MOUTH TWICE DAILY 60 tablet 6    multivitamin (THERAGRAN) per tablet Take 1 tablet by mouth once daily.      nitroGLYCERIN 0.2 mg/hr TD PT24 (NITRODUR) 0.2 mg/hr Place 1 patch onto the skin once daily. 30 patch 11    pravastatin (PRAVACHOL) 40 MG tablet TAKE ONE TABLET BY MOUTH ONCE DAILY 90 tablet 3    sertraline (ZOLOFT) 100 MG tablet Take 1.5 tablets (150 mg total) by mouth once daily. 135 tablet 3    sodium bicarbonate 650 MG tablet Take 1 tablet (650 mg total) by mouth 2 (two) times daily. 60 tablet 11    ZINC ACETATE ORAL Take 250 mg by mouth once daily.        Current Facility-Administered Medications on File Prior to Visit   Medication Dose Route Frequency Provider Last Rate Last Admin    denosumab (PROLIA) injection 60 mg  60 mg Subcutaneous Q6 Months Dilshad Rogers MD   60 mg at 08/29/18 1032     Review of patient's allergies indicates:   Allergen Reactions    Corticosteroids (glucocorticoids) Nausea Only and Other (See Comments)     Stomach pain, dizziness, headache    Oxycodone Other (See Comments)     Blood pressure dropped       Objective:     Physical Exam  Vitals and nursing note reviewed.   Constitutional:       Appearance: She is well-developed.   HENT:      Head: Normocephalic and atraumatic.   Eyes:      Conjunctiva/sclera: Conjunctivae normal.      Pupils: Pupils are equal, round, and reactive to light.   Cardiovascular:      Rate and Rhythm: Normal rate. Rhythm irregular.      Pulses: Intact distal  pulses.      Heart sounds: Normal heart sounds.   Pulmonary:      Effort: Pulmonary effort is normal.      Breath sounds: Normal breath sounds.   Abdominal:      General: Bowel sounds are normal.      Palpations: Abdomen is soft.   Musculoskeletal:         General: Normal range of motion.      Cervical back: Normal range of motion and neck supple.      Right lower leg: Edema present.      Left lower leg: Edema present.   Skin:     General: Skin is warm and dry.   Neurological:      Mental Status: She is alert and oriented to person, place, and time.       Assessment:     1. Angina pectoris    2. Atherosclerosis of aorta    3. Paroxysmal atrial fibrillation    4. Postural hypotension    5. Cardiac pacemaker in situ    6. Chronic diastolic heart failure    7. Essential hypertension    8. Mixed hyperlipidemia    9. Coronary artery disease : multiple vessels, s/p PTCA    10. Ischemic cardiomyopathy    11. Pedal edema        Plan:     Angina pectoris    Atherosclerosis of aorta    Paroxysmal atrial fibrillation    Postural hypotension    Cardiac pacemaker in situ    Chronic diastolic heart failure    Essential hypertension    Mixed hyperlipidemia    Coronary artery disease : multiple vessels, s/p PTCA    Ischemic cardiomyopathy    Pedal edema      Continue statin-HLP  Continue eliquis, metoprolol - PAF  Increase lasix - CHF/edema    Increase lasix  BMP 1 week  echo  Jeffery f/u

## 2023-04-27 DIAGNOSIS — D50.8 OTHER IRON DEFICIENCY ANEMIA: ICD-10-CM

## 2023-04-27 RX ORDER — IRON,CARBONYL/ASCORBIC ACID 100-250 MG
TABLET ORAL
Qty: 30 TABLET | Refills: 4 | Status: SHIPPED | OUTPATIENT
Start: 2023-04-27 | End: 2024-01-12

## 2023-04-28 ENCOUNTER — HOSPITAL ENCOUNTER (OUTPATIENT)
Dept: CARDIOLOGY | Facility: HOSPITAL | Age: 83
Discharge: HOME | End: 2023-04-28
Attending: INTERNAL MEDICINE
Payer: MEDICARE

## 2023-04-28 VITALS
HEIGHT: 64 IN | WEIGHT: 143 LBS | BODY MASS INDEX: 24.41 KG/M2 | DIASTOLIC BLOOD PRESSURE: 78 MMHG | SYSTOLIC BLOOD PRESSURE: 138 MMHG

## 2023-04-28 DIAGNOSIS — I50.32 CHRONIC DIASTOLIC HEART FAILURE: ICD-10-CM

## 2023-04-28 LAB
AORTIC ROOT ANNULUS: 3.08 CM
ASCENDING AORTA: 2.82 CM
AV INDEX (PROSTH): 0.41
AV MEAN GRADIENT: 6 MMHG
AV PEAK GRADIENT: 10 MMHG
AV VALVE AREA: 1.33 CM2
AV VELOCITY RATIO: 0.43
BSA FOR ECHO PROCEDURE: 1.71 M2
CV ECHO LV RWT: 0.37 CM
DOP CALC AO PEAK VEL: 1.6 M/S
DOP CALC AO VTI: 31.3 CM
DOP CALC LVOT AREA: 3.2 CM2
DOP CALC LVOT DIAMETER: 2.03 CM
DOP CALC LVOT PEAK VEL: 0.69 M/S
DOP CALC LVOT STROKE VOLUME: 41.73 CM3
DOP CALC MV VTI: 24.8 CM
DOP CALC RVOT PEAK VEL: 0.46 M/S
DOP CALC RVOT VTI: 7.7 CM
DOP CALCLVOT PEAK VEL VTI: 12.9 CM
E WAVE DECELERATION TIME: 179.96 MSEC
E/A RATIO: 4.7
E/E' RATIO: 14.4 M/S
ECHO LV POSTERIOR WALL: 0.91 CM (ref 0.6–1.1)
EJECTION FRACTION: 40 %
FRACTIONAL SHORTENING: 20 % (ref 28–44)
INTERVENTRICULAR SEPTUM: 1.12 CM (ref 0.6–1.1)
IVC DIAMETER: 1.59 CM
IVRT: 131.3 MSEC
LA MAJOR: 5.27 CM
LA MINOR: 5.88 CM
LA WIDTH: 4.4 CM
LEFT ATRIUM SIZE: 4.72 CM
LEFT ATRIUM VOLUME INDEX MOD: 60 ML/M2
LEFT ATRIUM VOLUME INDEX: 57.7 ML/M2
LEFT ATRIUM VOLUME MOD: 101.98 CM3
LEFT ATRIUM VOLUME: 98.12 CM3
LEFT INTERNAL DIMENSION IN SYSTOLE: 3.92 CM (ref 2.1–4)
LEFT VENTRICLE DIASTOLIC VOLUME INDEX: 65.81 ML/M2
LEFT VENTRICLE DIASTOLIC VOLUME: 111.87 ML
LEFT VENTRICLE MASS INDEX: 105 G/M2
LEFT VENTRICLE SYSTOLIC VOLUME INDEX: 39.2 ML/M2
LEFT VENTRICLE SYSTOLIC VOLUME: 66.58 ML
LEFT VENTRICULAR INTERNAL DIMENSION IN DIASTOLE: 4.88 CM (ref 3.5–6)
LEFT VENTRICULAR MASS: 178.42 G
LV LATERAL E/E' RATIO: 12 M/S
LV SEPTAL E/E' RATIO: 18 M/S
LVOT MG: 0.97 MMHG
LVOT MV: 0.45 CM/S
MR PISA EROA: 0.17 CM2
MV MEAN GRADIENT: 1 MMHG
MV PEAK A VEL: 0.23 M/S
MV PEAK E VEL: 1.08 M/S
MV PEAK GRADIENT: 5 MMHG
MV STENOSIS PRESSURE HALF TIME: 52.19 MS
MV VALVE AREA BY CONTINUITY EQUATION: 1.68 CM2
MV VALVE AREA P 1/2 METHOD: 4.22 CM2
OHS LV EJECTION FRACTION SIMPSONS BIPLANE MOD: 4 %
PISA MRMAX VEL: 5.74 M/S
PISA RADIUS: 0.64 CM
PISA TR MAX VEL: 2.45 M/S
PISA VN NYQUIST MS: 0.39 M/S
PISA VN NYQUIST: 0.39 M/S
PV MEAN GRADIENT: 0.43 MMHG
PV PEAK VELOCITY: 0.78 CM/S
RA MAJOR: 3.95 CM
RA PRESSURE: 8 MMHG
RA WIDTH: 3.87 CM
RIGHT VENTRICULAR END-DIASTOLIC DIMENSION: 3.09 CM
SINUS: 2.98 CM
STJ: 2.4 CM
TDI LATERAL: 0.09 M/S
TDI SEPTAL: 0.06 M/S
TDI: 0.08 M/S
TR MAX PG: 24 MMHG
TRICUSPID ANNULAR PLANE SYSTOLIC EXCURSION: 1.1 CM
TV REST PULMONARY ARTERY PRESSURE: 32 MMHG

## 2023-04-28 PROCEDURE — 93306 ECHO (CUPID ONLY): ICD-10-PCS | Mod: 26,HCNC,, | Performed by: INTERNAL MEDICINE

## 2023-04-28 PROCEDURE — 93306 TTE W/DOPPLER COMPLETE: CPT | Mod: 26,HCNC,, | Performed by: INTERNAL MEDICINE

## 2023-04-28 PROCEDURE — 93306 TTE W/DOPPLER COMPLETE: CPT | Mod: HCNC

## 2023-05-01 ENCOUNTER — TELEPHONE (OUTPATIENT)
Dept: CARDIOLOGY | Facility: CLINIC | Age: 83
End: 2023-05-01
Payer: MEDICARE

## 2023-05-01 NOTE — TELEPHONE ENCOUNTER
Attempted without success x2 to contact pt to discuss BMP results. Voicemail box full. Unable to leave message.      ----- Message from Eric Norris MD sent at 4/30/2023  7:28 AM CDT -----  BMP reviewed  Continue current meds

## 2023-05-01 NOTE — TELEPHONE ENCOUNTER
Attempted without success x2 to contact pt to discuss echo results. Voicemail box full. Unable to leave message.      ----- Message from Eric Norris MD sent at 4/30/2023  9:28 AM CDT -----  Please tell pt:  Echo shows no significant cahnges from previous echos  Continue current meds

## 2023-05-08 ENCOUNTER — OFFICE VISIT (OUTPATIENT)
Dept: CARDIOLOGY | Facility: CLINIC | Age: 83
End: 2023-05-08
Payer: MEDICARE

## 2023-05-08 ENCOUNTER — HOSPITAL ENCOUNTER (OUTPATIENT)
Dept: CARDIOLOGY | Facility: HOSPITAL | Age: 83
Discharge: HOME | End: 2023-05-08
Attending: INTERNAL MEDICINE
Payer: MEDICARE

## 2023-05-08 VITALS
DIASTOLIC BLOOD PRESSURE: 82 MMHG | HEIGHT: 64 IN | WEIGHT: 137.38 LBS | SYSTOLIC BLOOD PRESSURE: 128 MMHG | HEART RATE: 79 BPM | BODY MASS INDEX: 23.45 KG/M2 | OXYGEN SATURATION: 99 %

## 2023-05-08 DIAGNOSIS — R76.8 CENTROMERE ANTIBODY POSITIVE: ICD-10-CM

## 2023-05-08 DIAGNOSIS — I48.0 PAROXYSMAL ATRIAL FIBRILLATION: ICD-10-CM

## 2023-05-08 DIAGNOSIS — Z95.0 CARDIAC PACEMAKER IN SITU: ICD-10-CM

## 2023-05-08 DIAGNOSIS — G62.0 CHEMOTHERAPY-INDUCED NEUROPATHY: Chronic | ICD-10-CM

## 2023-05-08 DIAGNOSIS — Z95.0 PACEMAKER: ICD-10-CM

## 2023-05-08 DIAGNOSIS — R94.2 DIFFUSION CAPACITY OF LUNG (DL), DECREASED: ICD-10-CM

## 2023-05-08 DIAGNOSIS — E03.9 HYPOTHYROIDISM (ACQUIRED): Chronic | ICD-10-CM

## 2023-05-08 DIAGNOSIS — I25.5 ISCHEMIC CARDIOMYOPATHY: Chronic | ICD-10-CM

## 2023-05-08 DIAGNOSIS — C83.30 DIFFUSE LARGE B-CELL LYMPHOMA, UNSPECIFIED BODY REGION: ICD-10-CM

## 2023-05-08 DIAGNOSIS — I20.9 ANGINA PECTORIS: ICD-10-CM

## 2023-05-08 DIAGNOSIS — I25.10 CORONARY ARTERY DISEASE INVOLVING NATIVE CORONARY ARTERY OF NATIVE HEART WITHOUT ANGINA PECTORIS: Primary | Chronic | ICD-10-CM

## 2023-05-08 DIAGNOSIS — I48.0 PAROXYSMAL ATRIAL FIBRILLATION: Primary | ICD-10-CM

## 2023-05-08 DIAGNOSIS — J84.10 CALCIFIED GRANULOMA OF LUNG: ICD-10-CM

## 2023-05-08 DIAGNOSIS — E78.2 MIXED HYPERLIPIDEMIA: Chronic | ICD-10-CM

## 2023-05-08 DIAGNOSIS — R79.89 ABNORMAL LFTS (LIVER FUNCTION TESTS): ICD-10-CM

## 2023-05-08 DIAGNOSIS — I50.33 ACUTE ON CHRONIC DIASTOLIC CONGESTIVE HEART FAILURE: ICD-10-CM

## 2023-05-08 DIAGNOSIS — I10 ESSENTIAL HYPERTENSION: Chronic | ICD-10-CM

## 2023-05-08 DIAGNOSIS — I70.0 ATHEROSCLEROSIS OF AORTA: ICD-10-CM

## 2023-05-08 DIAGNOSIS — E21.3 HYPERPARATHYROIDISM: ICD-10-CM

## 2023-05-08 DIAGNOSIS — N18.32 STAGE 3B CHRONIC KIDNEY DISEASE: ICD-10-CM

## 2023-05-08 DIAGNOSIS — I25.5 ISCHEMIC CARDIOMYOPATHY: ICD-10-CM

## 2023-05-08 DIAGNOSIS — M05.79 RHEUMATOID ARTHRITIS INVOLVING MULTIPLE SITES WITH POSITIVE RHEUMATOID FACTOR: ICD-10-CM

## 2023-05-08 DIAGNOSIS — T45.1X5A CHEMOTHERAPY-INDUCED NEUROPATHY: Chronic | ICD-10-CM

## 2023-05-08 PROCEDURE — 93280 PM DEVICE PROGR EVAL DUAL: CPT

## 2023-05-08 PROCEDURE — 93010 ELECTROCARDIOGRAM REPORT: CPT | Mod: ,,, | Performed by: INTERNAL MEDICINE

## 2023-05-08 PROCEDURE — 1126F AMNT PAIN NOTED NONE PRSNT: CPT | Mod: CPTII,S$GLB,, | Performed by: INTERNAL MEDICINE

## 2023-05-08 PROCEDURE — 3288F FALL RISK ASSESSMENT DOCD: CPT | Mod: CPTII,S$GLB,, | Performed by: INTERNAL MEDICINE

## 2023-05-08 PROCEDURE — 99214 PR OFFICE/OUTPT VISIT, EST, LEVL IV, 30-39 MIN: ICD-10-PCS | Mod: S$GLB,,, | Performed by: INTERNAL MEDICINE

## 2023-05-08 PROCEDURE — 1157F PR ADVANCE CARE PLAN OR EQUIV PRESENT IN MEDICAL RECORD: ICD-10-PCS | Mod: CPTII,S$GLB,, | Performed by: INTERNAL MEDICINE

## 2023-05-08 PROCEDURE — 93280 PM DEVICE PROGR EVAL DUAL: CPT | Mod: 26,,, | Performed by: INTERNAL MEDICINE

## 2023-05-08 PROCEDURE — 1101F PR PT FALLS ASSESS DOC 0-1 FALLS W/OUT INJ PAST YR: ICD-10-PCS | Mod: CPTII,S$GLB,, | Performed by: INTERNAL MEDICINE

## 2023-05-08 PROCEDURE — 93010 EKG 12-LEAD: ICD-10-PCS | Mod: ,,, | Performed by: INTERNAL MEDICINE

## 2023-05-08 PROCEDURE — 1159F MED LIST DOCD IN RCRD: CPT | Mod: CPTII,S$GLB,, | Performed by: INTERNAL MEDICINE

## 2023-05-08 PROCEDURE — 3074F SYST BP LT 130 MM HG: CPT | Mod: CPTII,S$GLB,, | Performed by: INTERNAL MEDICINE

## 2023-05-08 PROCEDURE — 3288F PR FALLS RISK ASSESSMENT DOCUMENTED: ICD-10-PCS | Mod: CPTII,S$GLB,, | Performed by: INTERNAL MEDICINE

## 2023-05-08 PROCEDURE — 93280 CARDIAC DEVICE CHECK - IN CLINIC & HOSPITAL: ICD-10-PCS | Mod: 26,,, | Performed by: INTERNAL MEDICINE

## 2023-05-08 PROCEDURE — 3074F PR MOST RECENT SYSTOLIC BLOOD PRESSURE < 130 MM HG: ICD-10-PCS | Mod: CPTII,S$GLB,, | Performed by: INTERNAL MEDICINE

## 2023-05-08 PROCEDURE — 3079F DIAST BP 80-89 MM HG: CPT | Mod: CPTII,S$GLB,, | Performed by: INTERNAL MEDICINE

## 2023-05-08 PROCEDURE — 99999 PR PBB SHADOW E&M-EST. PATIENT-LVL V: CPT | Mod: PBBFAC,,, | Performed by: INTERNAL MEDICINE

## 2023-05-08 PROCEDURE — 1159F PR MEDICATION LIST DOCUMENTED IN MEDICAL RECORD: ICD-10-PCS | Mod: CPTII,S$GLB,, | Performed by: INTERNAL MEDICINE

## 2023-05-08 PROCEDURE — 1126F PR PAIN SEVERITY QUANTIFIED, NO PAIN PRESENT: ICD-10-PCS | Mod: CPTII,S$GLB,, | Performed by: INTERNAL MEDICINE

## 2023-05-08 PROCEDURE — 1157F ADVNC CARE PLAN IN RCRD: CPT | Mod: CPTII,S$GLB,, | Performed by: INTERNAL MEDICINE

## 2023-05-08 PROCEDURE — 1160F PR REVIEW ALL MEDS BY PRESCRIBER/CLIN PHARMACIST DOCUMENTED: ICD-10-PCS | Mod: CPTII,S$GLB,, | Performed by: INTERNAL MEDICINE

## 2023-05-08 PROCEDURE — 1160F RVW MEDS BY RX/DR IN RCRD: CPT | Mod: CPTII,S$GLB,, | Performed by: INTERNAL MEDICINE

## 2023-05-08 PROCEDURE — 1101F PT FALLS ASSESS-DOCD LE1/YR: CPT | Mod: CPTII,S$GLB,, | Performed by: INTERNAL MEDICINE

## 2023-05-08 PROCEDURE — 93005 ELECTROCARDIOGRAM TRACING: CPT | Mod: 59

## 2023-05-08 PROCEDURE — 99214 OFFICE O/P EST MOD 30 MIN: CPT | Mod: S$GLB,,, | Performed by: INTERNAL MEDICINE

## 2023-05-08 PROCEDURE — 99999 PR PBB SHADOW E&M-EST. PATIENT-LVL V: ICD-10-PCS | Mod: PBBFAC,,, | Performed by: INTERNAL MEDICINE

## 2023-05-08 PROCEDURE — 3079F PR MOST RECENT DIASTOLIC BLOOD PRESSURE 80-89 MM HG: ICD-10-PCS | Mod: CPTII,S$GLB,, | Performed by: INTERNAL MEDICINE

## 2023-05-08 NOTE — PROGRESS NOTES
"Subjective:   Patient ID:  Violet Swenson is a 83 y.o. female who presents for follow up of No chief complaint on file.      HPI  9/17/2020   81 yo female with ;ymphoma  S/p pcae cad s/p stent ckd heart failure ios here for f/u she has low back pain limited has had recurrent episodes of intrascapular pain radiating to left arm feels like muscle spasm none with exertion. No chf symptoms no leg swelling tia claudication chest pain syncope near syncope/.no palpitation. Has some improvement in appetite  .  11/16  Violet Swenson is a 80 y.o. female patient with a h/o anemia, anxiety, atrial flutter, lymphoma large cell B, CHF, CAD, depression, GERD, gout, heart failure, HLD, HTN, hypothyroidism kidney disease, lung nodule, obesity, metronic pacemaker, polyneuropathy, who presents to the Emergency Department for evaluation of fatigue which onset this morning. Per [facility], the pt has been intermittently stuttering her speech and was hypoglycemic upon arriving on scene. Pt's daughter states that the pt woke up and was chatting to her before she left the house around 0800 this morning. The pt reports falling back asleep and waking up around 0930 feeling very weak and fatigued. She states, "when I rolled over after waking up, it felt like someone was pushing me back in bed." Associated sxs include R arm weakness, generalized weakness, slurred speech, and trouble ambulating. Patient denies any fever, SOB, CP, n/v, numbness, dizziness, and all other sxs at this time. In the ED, H/H 8.9/28.6, Creatinine 1.5, , TSH 4.087, CT head with no acute findings. Tele-stroke recommended MRI/MRA brain, MRA neck lipid panel, hemoglobin A1c. Add ASA to Plavix if x 30 days if no contraindication. Atorvastatin 40 mg daily. Neurology, PT/speech consults. Patient placed in observation for CVA rule out under the care of hospital medicine.      * No surgery found *       Hospital Course:   Place an observation for " evaluation and treatment of right arm weakness, slurred speech, and gait instability. Symptoms concerning for acute stroke. Initially perform a CT of the head which showed no acute findings. Patient was unable to perform an MRI or MRA due to having an implantable device. Empirically treated for transient ischemic attack versus stroke. Performed an interval CT scan of the head which was also negative. She was evaluated by neurology who assisted with management. Neurology determined that even though we were unable to get positive imaging findings that she should be treated as likely having had an acute stroke.  Physical and occupational therapy evaluated the patient and recommended outpatient therapy. Speech language pathology evaluated the patient and recommended no restrictions. Discharge plan to return home and continue medication plan outpatient physical therapy and follow-up with primary care as needed         11/27/2020  Had chest pain since yesterday it is tight all over chest no associated shortness of breath. Her bp is elevated she cannot get comfortable feels like tightness . Has no leg swelling her speech and vision improved but he rrt sided weakness still evident she is taking her antiplatelet. She has not taken her meds this  Morning. She is not feeling good during the vist the pain has been ongoing since 4 pm yesterday. I gave her a s/l nitro and she improved.      12/14/2020     Here for f/u after hospital admit. She r/o for mi had cardiolite negative for ischemia. Her medical therapy was adjusted taken off gabapentin she has her aches back all over . She is not able to sleep at nite. No further cardiac symptomatology.     6/7/2021  Here for f/u her bp was elevated she ate salt with watermelon has no new complaints of leg swelling shortness of breath tia claudication syncope near syncope no blurred vision. She is only n lopressor half pill bid due yo hypotension/.      11/4/2021   Has been having pnd  over the past 2 weeks she wakes up short of breath has to get up move around. Has also some shortness of breath during the day. She has no palpitation. Has  No leg swelling her bp has been elevated. Has  Been compliant with diet. Has continous chest tightness.has no exertional angina. The chest pain is chronic she had is the reason she was sent top er last year. cardiolite was negative.      11/5/2021   Pacer interrogation showed afib since september 20 nitropach added this morning her bp is elevated had her echo today no labs done .she is on asa and plavix no arb      11/24/2021   [facility] ADJUST[facility] PLACED ON ELIQUIS DIURTETICS HAD SIGNIFICANT MITRAL REGURGITATION HAD HELGA CARDIOVERSION TO SR HAD CARDIOLITE WAS NEGATIVE    HER CHEST PAIN SHORTNESS OF BREATH RESOLVED SHE IS COMPLIANT WITH MEDS HTN CONTROLLED NO DIZZINESS LIGHTHEADEDNESS.      3/3/2022  Here frof /u no new complaints of chest pain shortness of breath palpitation syncope near syncope compliant with meds. Had falls twice uses her walker. No chf symptoms opr angina.    5/8/2023   haD LEG SWELLING NOTHING IN CHANGE OF EATING HABITS.SHE HAS BEEN EVALUATED WITH NEPHROLOGY HEPATOLOGY AND RHEUMATOLOGY  WAS TOLD IT IS CARDIAC SAW DR EL HER LASIX WAS DOUBLED. SHE LOST 17 LBS SHE FEELS MUCH BETTER LEG SWELLING RESOLVED. SHE HAS NO CHEST PAIN. HER EF IS UNCHANGED. SHE STOPPED LYRICAL.  Past Medical History:   Diagnosis Date    Age-related osteoporosis without current pathological fracture 8/20/2018    Anemia     Anxiety     Arthritis     Atrial flutter     Cancer     lymphoma Large cell B    CHF (congestive heart failure)     Chronic anemia 4/26/2017    Chronic midline low back pain with right-sided sciatica 8/20/2018    Coronary artery disease     01/2015 [facility] patent LCX. 50% stenosis in LAD and RCA.      Depression     Disorder of kidney and ureter     Encounter for blood transfusion     GERD (gastroesophageal reflux  disease)     Gout, arthritis     Heart failure     Hx of psychiatric care     Hyperlipidemia     Hypertension     Hypothyroidism     Immune deficiency disorder     Kidney disease     Lung nodule 2014    RML--stable    Obesity     Pacemaker     Metronic    Paroxysmal atrial fibrillation 3/15/2018    Pneumonia     Polyneuropathy     chemo induced    Psychiatric problem     Rheumatoid arthritis involving multiple sites with positive rheumatoid factor 4/19/2023    Stroke 11/16/2020    Tobacco dependence     quit 1976    Trouble in sleeping     Unstable angina 11/27/2020       Past Surgical History:   Procedure Laterality Date    APPENDECTOMY  1966 approx    CARDIAC PACEMAKER PLACEMENT  01/22/2015    CHOLECYSTECTOMY  1993    incidental at time of gastric bypass    COLON SURGERY Right 2017    hemicolectomy    COLONOSCOPY N/A 4/6/2017    Procedure: COLONOSCOPY;  Surgeon: Tye Enamorado MD;  Location: [facility];  Service: Endoscopy;  Laterality: N/A;    COLONOSCOPY N/A 11/28/2018    Procedure: COLONOSCOPY;  Surgeon: Saúl Arthur III, MD;  Location: [facility];  Service: Endoscopy;  Laterality: N/A;    CORONARY ANGIOPLASTY  02/2014    CORONARY STENT PLACEMENT  02/05/2014    ESOPHAGOGASTRODUODENOSCOPY N/A 11/28/2018    Procedure: EGD (ESOPHAGOGASTRODUODENOSCOPY);  Surgeon: Saúl Arthur III, MD;  Location: [facility];  Service: Endoscopy;  Laterality: N/A;    GASTRIC BYPASS  1993    with incidental choly    HERNIA REPAIR      INJECTION OF ANESTHETIC AGENT INTO SACROILIAC JOINT Right 10/8/2020    Procedure: Right BLOCK, SACROILIAC JOINT with RN IV sedation;  Surgeon: Madi Anton MD;  Location: [facility];  Service: Pain Management;  Laterality: Right;    JOINT REPLACEMENT Bilateral 2009    3 months apart    TONSILLECTOMY  1959       Social History     Tobacco Use    Smoking status: Former     Packs/day: 2.00     Years: 6.00     Pack years: 12.00     Types: Cigarettes     Quit date: 3/15/1976     Years since quitting:  "47.1    Smokeless tobacco: Never   Substance Use Topics    Alcohol use: No     Alcohol/week: 0.0 standard drinks    Drug use: No       Family History   Problem Relation Age of Onset    Heart disease Mother     Hypertension Mother     Cataracts Mother     Stomach cancer Father         "ulcers that turned to cancer"    Cancer Father         stomach    Pancreatic cancer Sister     Cancer Sister         pancreatic    Leukemia Brother         "leukemia which led to intestinal cancer"    Cataracts Brother     Cancer Brother         leukemia then later stomach cancer    Drug abuse Son     Cancer Son         prostate cancer    Prostate cancer Son     COPD Daughter     Asthma Daughter     Hypertension Maternal Grandfather     Stroke Maternal Grandfather     Alcohol abuse Neg Hx     Diabetes Neg Hx     Mental retardation Neg Hx     Mental illness Neg Hx        Current Outpatient Medications   Medication Sig    allopurinoL (ZYLOPRIM) 300 MG tablet Take 1 tablet (300 mg total) by mouth once daily.    ascorbic acid, vitamin C, (VITAMIN C) 100 MG tablet Take by mouth once daily.    aspirin (ECOTRIN) 81 MG EC tablet Take 81 mg by mouth nightly.     azelastine (ASTELIN) 137 mcg (0.1 %) nasal spray 1 spray (137 mcg total) by Nasal route 2 (two) times daily.    busPIRone (BUSPAR) 7.5 MG tablet Take 1 tablet (7.5 mg total) by mouth 2 (two) times a day.    calcitRIOL (ROCALTROL) 0.25 MCG Cap TAKE ONE CAPSULE BY MOUTH EVERY MONDAY, WEDNESDAY AND FRIDAY    clopidogreL (PLAVIX) 75 mg tablet TAKE ONE TABLET BY MOUTH EVERY DAY    diclofenac sodium 1 % Gel Apply 2 g topically once daily. Apply 2 g over painful joints once or twice a day.    DIPRIVAN 10 mg/mL infusion     DULoxetine (CYMBALTA) 30 MG capsule TAKE ONE CAPSULE BY MOUTH EVERY DAY    ELIQUIS 2.5 mg Tab Take 1 tablet (2.5 mg total) by mouth 2 (two) times daily.    fluticasone propionate (FLONASE) 50 mcg/actuation nasal spray 2 sprays (100 mcg total) by Each Nostril route once " daily.    furosemide (LASIX) 40 MG tablet Take 1 tablet (40 mg total) by mouth 2 (two) times daily. (Patient taking differently: Take 40 mg by mouth once daily.)    hydrOXYchloroQUINE (PLAQUENIL) 200 mg tablet Take 1 tablet (200 mg total) by mouth once daily.    iron-vitamin C 100-250 mg, ICAR-C, 100-250 mg Tab TAKE ONE TABLET BY MOUTH EVERY DAY    levocetirizine (XYZAL) 5 MG tablet Take 1 tablet (5 mg total) by mouth every evening.    levothyroxine (SYNTHROID) 75 MCG tablet Take 1 tablet (75 mcg total) by mouth before breakfast.    metoprolol tartrate (LOPRESSOR) 25 MG tablet TAKE ONE TABLET BY MOUTH TWICE DAILY    multivitamin (THERAGRAN) per tablet Take 1 tablet by mouth once daily.    nitroGLYCERIN 0.2 mg/hr TD PT24 (NITRODUR) 0.2 mg/hr Place 1 patch onto the skin once daily.    pravastatin (PRAVACHOL) 40 MG tablet TAKE ONE TABLET BY MOUTH ONCE DAILY    sertraline (ZOLOFT) 100 MG tablet Take 1.5 tablets (150 mg total) by mouth once daily.    sodium bicarbonate 650 MG tablet Take 1 tablet (650 mg total) by mouth 2 (two) times daily.    VITAMIN D2 1,250 mcg (50,000 unit) capsule TAKE ONE CAPSULE BY MOUTH EVERY 7 DAYS    ZINC ACETATE ORAL Take 250 mg by mouth once daily.     methylPREDNISolone (MEDROL DOSEPACK) 4 mg tablet use as directed     Current Facility-Administered Medications   Medication    denosumab (PROLIA) injection 60 mg     Current Outpatient Medications on File Prior to Visit   Medication Sig    allopurinoL (ZYLOPRIM) 300 MG tablet Take 1 tablet (300 mg total) by mouth once daily.    ascorbic acid, vitamin C, (VITAMIN C) 100 MG tablet Take by mouth once daily.    aspirin (ECOTRIN) 81 MG EC tablet Take 81 mg by mouth nightly.     azelastine (ASTELIN) 137 mcg (0.1 %) nasal spray 1 spray (137 mcg total) by Nasal route 2 (two) times daily.    busPIRone (BUSPAR) 7.5 MG tablet Take 1 tablet (7.5 mg total) by mouth 2 (two) times a day.    calcitRIOL (ROCALTROL) 0.25 MCG Cap TAKE ONE CAPSULE BY MOUTH EVERY  MONDAY, WEDNESDAY AND FRIDAY    clopidogreL (PLAVIX) 75 mg tablet TAKE ONE TABLET BY MOUTH EVERY DAY    diclofenac sodium 1 % Gel Apply 2 g topically once daily. Apply 2 g over painful joints once or twice a day.    DIPRIVAN 10 mg/mL infusion     DULoxetine (CYMBALTA) 30 MG capsule TAKE ONE CAPSULE BY MOUTH EVERY DAY    ELIQUIS 2.5 mg Tab Take 1 tablet (2.5 mg total) by mouth 2 (two) times daily.    fluticasone propionate (FLONASE) 50 mcg/actuation nasal spray 2 sprays (100 mcg total) by Each Nostril route once daily.    furosemide (LASIX) 40 MG tablet Take 1 tablet (40 mg total) by mouth 2 (two) times daily. (Patient taking differently: Take 40 mg by mouth once daily.)    hydrOXYchloroQUINE (PLAQUENIL) 200 mg tablet Take 1 tablet (200 mg total) by mouth once daily.    iron-vitamin C 100-250 mg, ICAR-C, 100-250 mg Tab TAKE ONE TABLET BY MOUTH EVERY DAY    levocetirizine (XYZAL) 5 MG tablet Take 1 tablet (5 mg total) by mouth every evening.    levothyroxine (SYNTHROID) 75 MCG tablet Take 1 tablet (75 mcg total) by mouth before breakfast.    metoprolol tartrate (LOPRESSOR) 25 MG tablet TAKE ONE TABLET BY MOUTH TWICE DAILY    multivitamin (THERAGRAN) per tablet Take 1 tablet by mouth once daily.    nitroGLYCERIN 0.2 mg/hr TD PT24 (NITRODUR) 0.2 mg/hr Place 1 patch onto the skin once daily.    pravastatin (PRAVACHOL) 40 MG tablet TAKE ONE TABLET BY MOUTH ONCE DAILY    sertraline (ZOLOFT) 100 MG tablet Take 1.5 tablets (150 mg total) by mouth once daily.    sodium bicarbonate 650 MG tablet Take 1 tablet (650 mg total) by mouth 2 (two) times daily.    VITAMIN D2 1,250 mcg (50,000 unit) capsule TAKE ONE CAPSULE BY MOUTH EVERY 7 DAYS    ZINC ACETATE ORAL Take 250 mg by mouth once daily.     methylPREDNISolone (MEDROL DOSEPACK) 4 mg tablet use as directed     Current Facility-Administered Medications on File Prior to Visit   Medication    denosumab (PROLIA) injection 60 mg     Review of patient's allergies indicates:    Allergen Reactions    Corticosteroids (glucocorticoids) Nausea Only and Other (See Comments)     Stomach pain, dizziness, headache    Oxycodone Other (See Comments)     Blood pressure dropped      Review of Systems   Constitutional: Negative for diaphoresis, malaise/fatigue and weight gain.   HENT:  Negative for hoarse voice.    Eyes:  Negative for double vision and visual disturbance.   Cardiovascular:  Negative for chest pain, claudication, cyanosis, dyspnea on exertion, irregular heartbeat, leg swelling, near-syncope, orthopnea, palpitations, paroxysmal nocturnal dyspnea and syncope.   Respiratory:  Negative for cough, hemoptysis, shortness of breath and snoring.    Hematologic/Lymphatic: Negative for bleeding problem. Does not bruise/bleed easily.   Skin:  Negative for color change and poor wound healing.   Musculoskeletal:  Negative for muscle cramps, muscle weakness and myalgias.   Gastrointestinal:  Negative for bloating, abdominal pain, change in bowel habit, diarrhea, heartburn, hematemesis, hematochezia, melena and nausea.   Neurological:  Negative for excessive daytime sleepiness, dizziness, headaches, light-headedness, loss of balance, numbness and weakness.   Psychiatric/Behavioral:  Negative for memory loss. The patient does not have insomnia.    Allergic/Immunologic: Negative for hives.     Objective:   Physical Exam  Vitals and nursing note reviewed.   Constitutional:       General: She is not in acute distress.     Appearance: Normal appearance. She is well-developed. She is not ill-appearing.   HENT:      Head: Normocephalic and atraumatic.   Eyes:      General: No scleral icterus.     Pupils: Pupils are equal, round, and reactive to light.   Neck:      Thyroid: No thyromegaly.      Vascular: Normal carotid pulses. No carotid bruit, hepatojugular reflux or JVD.      Trachea: No tracheal deviation.   Cardiovascular:      Rate and Rhythm: Normal rate and regular rhythm.      Pulses: Normal pulses.   "         Carotid pulses are 2+ on the right side and 2+ on the left side.       Radial pulses are 2+ on the right side and 2+ on the left side.        Femoral pulses are 2+ on the right side and 2+ on the left side.       Popliteal pulses are 2+ on the right side and 2+ on the left side.        Dorsalis pedis pulses are 2+ on the right side and 2+ on the left side.        Posterior tibial pulses are 2+ on the right side and 2+ on the left side.      Heart sounds: Murmur heard.   Harsh midsystolic murmur is present with a grade of 1/6 at the upper right sternal border radiating to the neck.     No friction rub. No gallop.   Pulmonary:      Effort: Pulmonary effort is normal. No respiratory distress.      Breath sounds: Normal breath sounds. No wheezing, rhonchi or rales.      Comments: PACER SITE WELL HEALED.  Chest:      Chest wall: No tenderness.   Abdominal:      General: Bowel sounds are normal. There is no abdominal bruit.      Palpations: Abdomen is soft. There is no hepatomegaly or pulsatile mass.      Tenderness: There is no abdominal tenderness.   Musculoskeletal:      Right shoulder: No deformity.      Cervical back: Normal range of motion and neck supple.   Skin:     General: Skin is warm and dry.      Findings: No erythema or rash.      Nails: There is no clubbing.   Neurological:      Mental Status: She is alert and oriented to person, place, and time.      Cranial Nerves: No cranial nerve deficit.      Coordination: Coordination normal.   Psychiatric:         Speech: Speech normal.         Behavior: Behavior normal.     Vitals:    05/08/23 1343 05/08/23 1344   BP: 130/80 128/82   BP Location: Right arm Left arm   Patient Position: Sitting Sitting   BP Method: Medium (Manual) Medium (Manual)   Pulse: 79    SpO2: 99%    Weight: 62.3 kg (137 lb 5.6 oz)    Height: 5' 4" (1.626 m)      Lab Results   Component Value Date    CHOL 153 03/30/2022    CHOL 131 11/30/2021    CHOL 140 06/01/2021      Body mass index " is 23.58 kg/m².   Lab Results   Component Value Date    HGBA1C 4.9 11/16/2020      BMP  Lab Results   Component Value Date     04/28/2023     04/28/2023    K 3.7 04/28/2023    K 3.7 04/28/2023     04/28/2023     04/28/2023    CO2 25 04/28/2023    CO2 25 04/28/2023    BUN 43 (H) 04/28/2023    BUN 43 (H) 04/28/2023    CREATININE 1.9 (H) 04/28/2023    CREATININE 1.9 (H) 04/28/2023    CALCIUM 8.9 04/28/2023    CALCIUM 8.9 04/28/2023    ANIONGAP 15 04/28/2023    ANIONGAP 15 04/28/2023    EGFRNORACEVR 26 (A) 04/28/2023    EGFRNORACEVR 26 (A) 04/28/2023      Lab Results   Component Value Date    HDL 67 03/30/2022    HDL 61 11/30/2021    HDL 78 (H) 06/01/2021     Lab Results   Component Value Date    LDLCALC 69.8 03/30/2022    LDLCALC 55.2 (L) 11/30/2021    LDLCALC 43.2 (L) 06/01/2021     Lab Results   Component Value Date    TRIG 81 03/30/2022    TRIG 74 11/30/2021    TRIG 94 06/01/2021     Lab Results   Component Value Date    CHOLHDL 43.8 03/30/2022    CHOLHDL 46.6 11/30/2021    CHOLHDL 55.7 (H) 06/01/2021       Chemistry        Component Value Date/Time     04/28/2023 1112     04/28/2023 1112    K 3.7 04/28/2023 1112    K 3.7 04/28/2023 1112     04/28/2023 1112     04/28/2023 1112    CO2 25 04/28/2023 1112    CO2 25 04/28/2023 1112    BUN 43 (H) 04/28/2023 1112    BUN 43 (H) 04/28/2023 1112    CREATININE 1.9 (H) 04/28/2023 1112    CREATININE 1.9 (H) 04/28/2023 1112     (H) 04/28/2023 1112     (H) 04/28/2023 1112        Component Value Date/Time    CALCIUM 8.9 04/28/2023 1112    CALCIUM 8.9 04/28/2023 1112    ALKPHOS 51 (L) 04/28/2023 1112    AST 40 04/28/2023 1112    ALT 34 04/28/2023 1112    BILITOT 0.4 04/28/2023 1112    ESTGFRAFRICA 35 (A) 05/31/2022 0909    EGFRNONAA 30 (A) 05/31/2022 0909          Lab Results   Component Value Date    TSH 9.408 (H) 04/19/2023     Lab Results   Component Value Date    INR 1.0 04/03/2023    INR 0.9 11/07/2021    INR 1.0  11/06/2021     Lab Results   Component Value Date    WBC 6.04 04/03/2023    HGB 10.4 (L) 04/03/2023    HCT 33.7 (L) 04/03/2023     (H) 04/03/2023     04/03/2023     BMP  Sodium   Date Value Ref Range Status   04/28/2023 141 136 - 145 mmol/L Final   04/28/2023 141 136 - 145 mmol/L Final     Potassium   Date Value Ref Range Status   04/28/2023 3.7 3.5 - 5.1 mmol/L Final   04/28/2023 3.7 3.5 - 5.1 mmol/L Final     Chloride   Date Value Ref Range Status   04/28/2023 101 95 - 110 mmol/L Final   04/28/2023 101 95 - 110 mmol/L Final     CO2   Date Value Ref Range Status   04/28/2023 25 23 - 29 mmol/L Final   04/28/2023 25 23 - 29 mmol/L Final     BUN   Date Value Ref Range Status   04/28/2023 43 (H) 8 - 23 mg/dL Final   04/28/2023 43 (H) 8 - 23 mg/dL Final     Creatinine   Date Value Ref Range Status   04/28/2023 1.9 (H) 0.5 - 1.4 mg/dL Final   04/28/2023 1.9 (H) 0.5 - 1.4 mg/dL Final     Calcium   Date Value Ref Range Status   04/28/2023 8.9 8.7 - 10.5 mg/dL Final   04/28/2023 8.9 8.7 - 10.5 mg/dL Final     Anion Gap   Date Value Ref Range Status   04/28/2023 15 8 - 16 mmol/L Final   04/28/2023 15 8 - 16 mmol/L Final     eGFR if    Date Value Ref Range Status   05/31/2022 35 (A) >60 mL/min/1.73 m^2 Final     eGFR if non    Date Value Ref Range Status   05/31/2022 30 (A) >60 mL/min/1.73 m^2 Final     Comment:     Calculation used to obtain the estimated glomerular filtration  rate (eGFR) is the CKD-EPI equation.        CrCl cannot be calculated (Patient's most recent lab result is older than the maximum 7 days allowed.).  Summary    The left ventricle is normal in size with eccentric hypertrophy and mildly decreased systolic function.  Severe left atrial enlargement.  Grade III left ventricular diastolic dysfunction.  The estimated PA systolic pressure is 32 mmHg.  Normal right ventricular size with normal right ventricular systolic function.  Intermediate central venous  pressure (8 mmHg).  The estimated ejection fraction is 40%.  There is abnormal septal wall motion consistent with right ventricular pacemaker.  Moderate mitral regurgitation.  Mild tricuspid regurgitation.     Assessment:     1. Coronary artery disease : multiple vessels, s/p PTCA    2. Chemotherapy-induced neuropathy    3. Diffusion capacity of lung (dl), decreased    4. Calcified granuloma of lung    5. Angina pectoris    6. Atherosclerosis of aorta    7. Paroxysmal atrial fibrillation    8. Cardiac pacemaker in situ    9. Essential hypertension    10. Ischemic cardiomyopathy    11. Mixed hyperlipidemia    12. Stage 3b chronic kidney disease    13. Centromere antibody positive    14. Rheumatoid arthritis involving multiple sites with positive rheumatoid factor    15. Diffuse large B-cell lymphoma, unspecified body region    16. Hypothyroidism (acquired)    17. Hyperparathyroidism    18. Abnormal LFTs (liver function tests)      SHE HAS AN EPISODE OF WHAT APPEARS CHF DECOMPENSATION THAT RESPONDED TO DIURETICS NOT SURE OF ETIOLOGY OF CHF WILL NEED TO INTERROGATE PACER MAKE DONI ESHE IS NOT PACING 100% IN RV IN ADDITION TO RULING OUT AFIB AND ADDRESS THYROID SUPPLEMENT. HAS ALSO KNOWN CAD WILL NEED TO RULE OUT ISCHEMIA HER EF IS NOT CHANGED AND HER VALVULAR DISEASE HAS NOT PROGRESSED SHE IS SALT COMPLIANT. AND MEDS COMPLIANT.   WILL R/O ISCHEMIA INTERROGATE PACER WILL DROP LASIX DOSE TO ALTERNATING BID AND ONCE DAILY.   Plan:   AS PER ABOVE     PHONE REVIEW    6 WEEKS F/U    LOW SALT DIET./

## 2023-05-12 ENCOUNTER — PES CALL (OUTPATIENT)
Dept: ADMINISTRATIVE | Facility: CLINIC | Age: 83
End: 2023-05-12
Payer: MEDICARE

## 2023-05-19 ENCOUNTER — HOSPITAL ENCOUNTER (OUTPATIENT)
Dept: RADIOLOGY | Facility: HOSPITAL | Age: 83
Discharge: HOME | End: 2023-05-19
Attending: INTERNAL MEDICINE
Payer: MEDICARE

## 2023-05-19 ENCOUNTER — TELEPHONE (OUTPATIENT)
Dept: CARDIOLOGY | Facility: CLINIC | Age: 83
End: 2023-05-19
Payer: MEDICARE

## 2023-05-19 ENCOUNTER — HOSPITAL ENCOUNTER (OUTPATIENT)
Dept: CARDIOLOGY | Facility: HOSPITAL | Age: 83
Discharge: HOME | End: 2023-05-19
Attending: INTERNAL MEDICINE
Payer: MEDICARE

## 2023-05-19 DIAGNOSIS — I25.10 CORONARY ARTERY DISEASE INVOLVING NATIVE CORONARY ARTERY OF NATIVE HEART WITHOUT ANGINA PECTORIS: Chronic | ICD-10-CM

## 2023-05-19 DIAGNOSIS — I50.33 ACUTE ON CHRONIC DIASTOLIC CONGESTIVE HEART FAILURE: ICD-10-CM

## 2023-05-19 DIAGNOSIS — I25.5 ISCHEMIC CARDIOMYOPATHY: Chronic | ICD-10-CM

## 2023-05-19 LAB
CV STRESS BASE HR: 83 BPM
DIASTOLIC BLOOD PRESSURE: 91 MMHG
NUC REST EJECTION FRACTION: 62
NUC STRESS EJECTION FRACTION: 49 %
OHS CV CPX 85 PERCENT MAX PREDICTED HEART RATE MALE: 114
OHS CV CPX MAX PREDICTED HEART RATE: 134
OHS CV CPX PATIENT IS FEMALE: 1
OHS CV CPX PATIENT IS MALE: 0
OHS CV CPX PEAK DIASTOLIC BLOOD PRESSURE: 72 MMHG
OHS CV CPX PEAK HEAR RATE: 85 BPM
OHS CV CPX PEAK RATE PRESSURE PRODUCT: 9180
OHS CV CPX PEAK SYSTOLIC BLOOD PRESSURE: 108 MMHG
OHS CV CPX PERCENT MAX PREDICTED HEART RATE ACHIEVED: 64
OHS CV CPX RATE PRESSURE PRODUCT PRESENTING: NORMAL
SYSTOLIC BLOOD PRESSURE: 127 MMHG

## 2023-05-19 PROCEDURE — 63600175 PHARM REV CODE 636 W HCPCS: Performed by: INTERNAL MEDICINE

## 2023-05-19 PROCEDURE — 78452 HT MUSCLE IMAGE SPECT MULT: CPT

## 2023-05-19 PROCEDURE — 78452 NUCLEAR STRESS - CARDIOLOGY INTERPRETED (CUPID ONLY): ICD-10-PCS | Mod: 26,,, | Performed by: INTERNAL MEDICINE

## 2023-05-19 PROCEDURE — 78452 HT MUSCLE IMAGE SPECT MULT: CPT | Mod: 26,,, | Performed by: INTERNAL MEDICINE

## 2023-05-19 PROCEDURE — 93016 NUCLEAR STRESS - CARDIOLOGY INTERPRETED (CUPID ONLY): ICD-10-PCS | Mod: ,,, | Performed by: INTERNAL MEDICINE

## 2023-05-19 PROCEDURE — 93017 CV STRESS TEST TRACING ONLY: CPT

## 2023-05-19 PROCEDURE — 93018 NUCLEAR STRESS - CARDIOLOGY INTERPRETED (CUPID ONLY): ICD-10-PCS | Mod: ,,, | Performed by: INTERNAL MEDICINE

## 2023-05-19 PROCEDURE — 93018 CV STRESS TEST I&R ONLY: CPT | Mod: ,,, | Performed by: INTERNAL MEDICINE

## 2023-05-19 PROCEDURE — 93016 CV STRESS TEST SUPVJ ONLY: CPT | Mod: ,,, | Performed by: INTERNAL MEDICINE

## 2023-05-19 RX ORDER — REGADENOSON 0.08 MG/ML
0.4 INJECTION, SOLUTION INTRAVENOUS ONCE
Status: COMPLETED | OUTPATIENT
Start: 2023-05-19 | End: 2023-05-19

## 2023-05-19 RX ADMIN — REGADENOSON 0.4 MG: 0.08 INJECTION, SOLUTION INTRAVENOUS at 09:05

## 2023-05-19 NOTE — TELEPHONE ENCOUNTER
.LVM for patient to call the office regarding results.      ----- Message from Nader Gil MD sent at 5/19/2023  2:56 PM CDT -----  Stress test looks good

## 2023-05-22 ENCOUNTER — TELEPHONE (OUTPATIENT)
Dept: CARDIOLOGY | Facility: CLINIC | Age: 83
End: 2023-05-22
Payer: MEDICARE

## 2023-05-22 NOTE — TELEPHONE ENCOUNTER
Patient was notified of results. All questions were answered. Pt verbalized understanding. Pt will call back with any other questions or concerns.      ----- Message from Nader Gil MD sent at 5/19/2023  2:56 PM CDT -----  Stress test looks good    ----- Message from Beckie Dill sent at 5/22/2023  8:18 AM CDT -----  Pt is requesting the nurse call her back concerning a call she missed at [phone]  Thx jm

## 2023-06-16 ENCOUNTER — LAB VISIT (OUTPATIENT)
Dept: LAB | Facility: HOSPITAL | Age: 83
End: 2023-06-16
Attending: INTERNAL MEDICINE
Payer: MEDICARE

## 2023-06-16 DIAGNOSIS — I25.10 CORONARY ARTERY DISEASE INVOLVING NATIVE CORONARY ARTERY OF NATIVE HEART WITHOUT ANGINA PECTORIS: Primary | ICD-10-CM

## 2023-06-16 DIAGNOSIS — E78.2 MIXED HYPERLIPIDEMIA: Chronic | ICD-10-CM

## 2023-06-16 DIAGNOSIS — I25.10 CORONARY ARTERY DISEASE INVOLVING NATIVE CORONARY ARTERY OF NATIVE HEART WITHOUT ANGINA PECTORIS: ICD-10-CM

## 2023-06-16 DIAGNOSIS — Z95.0 PACEMAKER: ICD-10-CM

## 2023-06-16 PROCEDURE — 80061 LIPID PANEL: CPT | Performed by: INTERNAL MEDICINE

## 2023-06-16 PROCEDURE — 36415 COLL VENOUS BLD VENIPUNCTURE: CPT | Performed by: INTERNAL MEDICINE

## 2023-06-16 PROCEDURE — 80053 COMPREHEN METABOLIC PANEL: CPT | Performed by: INTERNAL MEDICINE

## 2023-06-17 LAB
ALBUMIN SERPL BCP-MCNC: 3.7 G/DL (ref 3.5–5.2)
ALP SERPL-CCNC: 43 U/L (ref 55–135)
ALT SERPL W/O P-5'-P-CCNC: 43 U/L (ref 10–44)
ANION GAP SERPL CALC-SCNC: 11 MMOL/L (ref 8–16)
AST SERPL-CCNC: 62 U/L (ref 10–40)
BILIRUB SERPL-MCNC: 0.4 MG/DL (ref 0.1–1)
BUN SERPL-MCNC: 30 MG/DL (ref 8–23)
CALCIUM SERPL-MCNC: 9.1 MG/DL (ref 8.7–10.5)
CHLORIDE SERPL-SCNC: 111 MMOL/L (ref 95–110)
CHOLEST SERPL-MCNC: 139 MG/DL (ref 120–199)
CHOLEST/HDLC SERPL: 2.2 {RATIO} (ref 2–5)
CO2 SERPL-SCNC: 21 MMOL/L (ref 23–29)
CREAT SERPL-MCNC: 1.4 MG/DL (ref 0.5–1.4)
EST. GFR  (NO RACE VARIABLE): 37.6 ML/MIN/1.73 M^2
GLUCOSE SERPL-MCNC: 73 MG/DL (ref 70–110)
HDLC SERPL-MCNC: 63 MG/DL (ref 40–75)
HDLC SERPL: 45.3 % (ref 20–50)
LDLC SERPL CALC-MCNC: 56.6 MG/DL (ref 63–159)
NONHDLC SERPL-MCNC: 76 MG/DL
POTASSIUM SERPL-SCNC: 4.4 MMOL/L (ref 3.5–5.1)
PROT SERPL-MCNC: 6.8 G/DL (ref 6–8.4)
SODIUM SERPL-SCNC: 143 MMOL/L (ref 136–145)
TRIGL SERPL-MCNC: 97 MG/DL (ref 30–150)

## 2023-06-19 ENCOUNTER — TELEPHONE (OUTPATIENT)
Dept: CARDIOLOGY | Facility: CLINIC | Age: 83
End: 2023-06-19
Payer: MEDICARE

## 2023-06-19 NOTE — TELEPHONE ENCOUNTER
Patient was notified of results. All questions were answered. Pt verbalized understanding. Pt will call back with any other questions or concerns.        ----- Message from Nader Gil MD sent at 6/18/2023  7:51 PM CDT -----  Labs look good

## 2023-06-21 ENCOUNTER — TELEPHONE (OUTPATIENT)
Dept: CARDIOLOGY | Facility: CLINIC | Age: 83
End: 2023-06-21
Payer: MEDICARE

## 2023-06-21 NOTE — TELEPHONE ENCOUNTER
LVM  for patient to call to confirm if she can make 0920 am appt tomorrow ir if she needs to r/s this appt due to Dr. Jeffery joshit be in tomorrow afternoon. Called spouse number as well but voicemail is full

## 2023-06-22 ENCOUNTER — OFFICE VISIT (OUTPATIENT)
Dept: CARDIOLOGY | Facility: CLINIC | Age: 83
End: 2023-06-22
Payer: MEDICARE

## 2023-06-22 VITALS
DIASTOLIC BLOOD PRESSURE: 80 MMHG | OXYGEN SATURATION: 99 % | HEIGHT: 64 IN | SYSTOLIC BLOOD PRESSURE: 124 MMHG | HEART RATE: 88 BPM | BODY MASS INDEX: 22.2 KG/M2 | WEIGHT: 130.06 LBS

## 2023-06-22 DIAGNOSIS — Z95.0 CARDIAC PACEMAKER IN SITU: ICD-10-CM

## 2023-06-22 DIAGNOSIS — I70.0 ATHEROSCLEROSIS OF AORTA: ICD-10-CM

## 2023-06-22 DIAGNOSIS — I25.10 CORONARY ARTERY DISEASE INVOLVING NATIVE CORONARY ARTERY OF NATIVE HEART WITHOUT ANGINA PECTORIS: Chronic | ICD-10-CM

## 2023-06-22 DIAGNOSIS — I25.5 ISCHEMIC CARDIOMYOPATHY: Chronic | ICD-10-CM

## 2023-06-22 DIAGNOSIS — I20.9 ANGINA PECTORIS: Primary | ICD-10-CM

## 2023-06-22 DIAGNOSIS — D84.821 IMMUNOSUPPRESSION DUE TO DRUG THERAPY: ICD-10-CM

## 2023-06-22 DIAGNOSIS — M05.79 RHEUMATOID ARTHRITIS INVOLVING MULTIPLE SITES WITH POSITIVE RHEUMATOID FACTOR: ICD-10-CM

## 2023-06-22 DIAGNOSIS — I50.32 CHRONIC DIASTOLIC HEART FAILURE: ICD-10-CM

## 2023-06-22 DIAGNOSIS — Z79.899 IMMUNOSUPPRESSION DUE TO DRUG THERAPY: ICD-10-CM

## 2023-06-22 DIAGNOSIS — E87.5 HYPERKALEMIA: ICD-10-CM

## 2023-06-22 DIAGNOSIS — E78.2 MIXED HYPERLIPIDEMIA: Chronic | ICD-10-CM

## 2023-06-22 DIAGNOSIS — N18.32 STAGE 3B CHRONIC KIDNEY DISEASE: ICD-10-CM

## 2023-06-22 DIAGNOSIS — I48.0 PAROXYSMAL ATRIAL FIBRILLATION: ICD-10-CM

## 2023-06-22 DIAGNOSIS — R76.8 CENTROMERE ANTIBODY POSITIVE: ICD-10-CM

## 2023-06-22 DIAGNOSIS — I95.1 POSTURAL HYPOTENSION: ICD-10-CM

## 2023-06-22 DIAGNOSIS — I10 ESSENTIAL HYPERTENSION: Chronic | ICD-10-CM

## 2023-06-22 PROCEDURE — 3288F PR FALLS RISK ASSESSMENT DOCUMENTED: ICD-10-PCS | Mod: HCNC,CPTII,S$GLB, | Performed by: INTERNAL MEDICINE

## 2023-06-22 PROCEDURE — 99213 OFFICE O/P EST LOW 20 MIN: CPT | Mod: HCNC,S$GLB,, | Performed by: INTERNAL MEDICINE

## 2023-06-22 PROCEDURE — 1101F PT FALLS ASSESS-DOCD LE1/YR: CPT | Mod: HCNC,CPTII,S$GLB, | Performed by: INTERNAL MEDICINE

## 2023-06-22 PROCEDURE — 3079F PR MOST RECENT DIASTOLIC BLOOD PRESSURE 80-89 MM HG: ICD-10-PCS | Mod: HCNC,CPTII,S$GLB, | Performed by: INTERNAL MEDICINE

## 2023-06-22 PROCEDURE — 1160F PR REVIEW ALL MEDS BY PRESCRIBER/CLIN PHARMACIST DOCUMENTED: ICD-10-PCS | Mod: HCNC,CPTII,S$GLB, | Performed by: INTERNAL MEDICINE

## 2023-06-22 PROCEDURE — 1101F PR PT FALLS ASSESS DOC 0-1 FALLS W/OUT INJ PAST YR: ICD-10-PCS | Mod: HCNC,CPTII,S$GLB, | Performed by: INTERNAL MEDICINE

## 2023-06-22 PROCEDURE — 3074F SYST BP LT 130 MM HG: CPT | Mod: HCNC,CPTII,S$GLB, | Performed by: INTERNAL MEDICINE

## 2023-06-22 PROCEDURE — 1157F PR ADVANCE CARE PLAN OR EQUIV PRESENT IN MEDICAL RECORD: ICD-10-PCS | Mod: HCNC,CPTII,S$GLB, | Performed by: INTERNAL MEDICINE

## 2023-06-22 PROCEDURE — 1159F PR MEDICATION LIST DOCUMENTED IN MEDICAL RECORD: ICD-10-PCS | Mod: HCNC,CPTII,S$GLB, | Performed by: INTERNAL MEDICINE

## 2023-06-22 PROCEDURE — 1157F ADVNC CARE PLAN IN RCRD: CPT | Mod: HCNC,CPTII,S$GLB, | Performed by: INTERNAL MEDICINE

## 2023-06-22 PROCEDURE — 3288F FALL RISK ASSESSMENT DOCD: CPT | Mod: HCNC,CPTII,S$GLB, | Performed by: INTERNAL MEDICINE

## 2023-06-22 PROCEDURE — 99999 PR PBB SHADOW E&M-EST. PATIENT-LVL V: ICD-10-PCS | Mod: PBBFAC,,, | Performed by: INTERNAL MEDICINE

## 2023-06-22 PROCEDURE — 1160F RVW MEDS BY RX/DR IN RCRD: CPT | Mod: HCNC,CPTII,S$GLB, | Performed by: INTERNAL MEDICINE

## 2023-06-22 PROCEDURE — 3074F PR MOST RECENT SYSTOLIC BLOOD PRESSURE < 130 MM HG: ICD-10-PCS | Mod: HCNC,CPTII,S$GLB, | Performed by: INTERNAL MEDICINE

## 2023-06-22 PROCEDURE — 1159F MED LIST DOCD IN RCRD: CPT | Mod: HCNC,CPTII,S$GLB, | Performed by: INTERNAL MEDICINE

## 2023-06-22 PROCEDURE — 99999 PR PBB SHADOW E&M-EST. PATIENT-LVL V: CPT | Mod: PBBFAC,,, | Performed by: INTERNAL MEDICINE

## 2023-06-22 PROCEDURE — 1126F AMNT PAIN NOTED NONE PRSNT: CPT | Mod: HCNC,CPTII,S$GLB, | Performed by: INTERNAL MEDICINE

## 2023-06-22 PROCEDURE — 3079F DIAST BP 80-89 MM HG: CPT | Mod: HCNC,CPTII,S$GLB, | Performed by: INTERNAL MEDICINE

## 2023-06-22 PROCEDURE — 1126F PR PAIN SEVERITY QUANTIFIED, NO PAIN PRESENT: ICD-10-PCS | Mod: HCNC,CPTII,S$GLB, | Performed by: INTERNAL MEDICINE

## 2023-06-22 PROCEDURE — 99213 PR OFFICE/OUTPT VISIT, EST, LEVL III, 20-29 MIN: ICD-10-PCS | Mod: HCNC,S$GLB,, | Performed by: INTERNAL MEDICINE

## 2023-06-22 NOTE — PROGRESS NOTES
"Subjective:   Patient ID:  Violet Swenson is a 83 y.o. female who presents for follow up of No chief complaint on file.      HPI  9/17/2020   81 yo female with ;ymphoma  S/p pcae cad s/p stent ckd heart failure ios here for f/u she has low back pain limited has had recurrent episodes of intrascapular pain radiating to left arm feels like muscle spasm none with exertion. No chf symptoms no leg swelling tia claudication chest pain syncope near syncope/.no palpitation. Has some improvement in appetite  .  11/16  Violet Swenson is a 80 y.o. female patient with a h/o anemia, anxiety, atrial flutter, lymphoma large cell B, CHF, CAD, depression, GERD, gout, heart failure, HLD, HTN, hypothyroidism kidney disease, lung nodule, obesity, metronic pacemaker, polyneuropathy, who presents to the Emergency Department for evaluation of fatigue which onset this morning. Per [facility], the pt has been intermittently stuttering her speech and was hypoglycemic upon arriving on scene. Pt's daughter states that the pt woke up and was chatting to her before she left the house around 0800 this morning. The pt reports falling back asleep and waking up around 0930 feeling very weak and fatigued. She states, "when I rolled over after waking up, it felt like someone was pushing me back in bed." Associated sxs include R arm weakness, generalized weakness, slurred speech, and trouble ambulating. Patient denies any fever, SOB, CP, n/v, numbness, dizziness, and all other sxs at this time. In the ED, H/H 8.9/28.6, Creatinine 1.5, , TSH 4.087, CT head with no acute findings. Tele-stroke recommended MRI/MRA brain, MRA neck lipid panel, hemoglobin A1c. Add ASA to Plavix if x 30 days if no contraindication. Atorvastatin 40 mg daily. Neurology, PT/speech consults. Patient placed in observation for CVA rule out under the care of hospital medicine.      * No surgery found *       Hospital Course:   Place an observation for " evaluation and treatment of right arm weakness, slurred speech, and gait instability. Symptoms concerning for acute stroke. Initially perform a CT of the head which showed no acute findings. Patient was unable to perform an MRI or MRA due to having an implantable device. Empirically treated for transient ischemic attack versus stroke. Performed an interval CT scan of the head which was also negative. She was evaluated by neurology who assisted with management. Neurology determined that even though we were unable to get positive imaging findings that she should be treated as likely having had an acute stroke.  Physical and occupational therapy evaluated the patient and recommended outpatient therapy. Speech language pathology evaluated the patient and recommended no restrictions. Discharge plan to return home and continue medication plan outpatient physical therapy and follow-up with primary care as needed         11/27/2020  Had chest pain since yesterday it is tight all over chest no associated shortness of breath. Her bp is elevated she cannot get comfortable feels like tightness . Has no leg swelling her speech and vision improved but he rrt sided weakness still evident she is taking her antiplatelet. She has not taken her meds this  Morning. She is not feeling good during the vist the pain has been ongoing since 4 pm yesterday. I gave her a s/l nitro and she improved.      12/14/2020     Here for f/u after hospital admit. She r/o for mi had cardiolite negative for ischemia. Her medical therapy was adjusted taken off gabapentin she has her aches back all over . She is not able to sleep at nite. No further cardiac symptomatology.     6/7/2021  Here for f/u her bp was elevated she ate salt with watermelon has no new complaints of leg swelling shortness of breath tia claudication syncope near syncope no blurred vision. She is only n lopressor half pill bid due yo hypotension/.      11/4/2021   Has been having pnd  over the past 2 weeks she wakes up short of breath has to get up move around. Has also some shortness of breath during the day. She has no palpitation. Has  No leg swelling her bp has been elevated. Has  Been compliant with diet. Has continous chest tightness.has no exertional angina. The chest pain is chronic she had is the reason she was sent top er last year. cardiolite was negative.      11/5/2021   Pacer interrogation showed afib since september 20 nitropach added this morning her bp is elevated had her echo today no labs done .she is on asa and plavix no arb      11/24/2021   ADMITTED TO [facility] MEDICAL TEHRAPY ADJUSTED PLACED ON ELIQUIS DIURTETICS HAD SIGNIFICANT MITRAL REGURGITATION HAD HELGA CARDIOVERSION TO SR HAD CARDIOLITE WAS NEGATIVE    HER CHEST PAIN SHORTNESS OF BREATH RESOLVED SHE IS COMPLIANT WITH MEDS HTN CONTROLLED NO DIZZINESS LIGHTHEADEDNESS.      3/3/2022  Here frof /u no new complaints of chest pain shortness of breath palpitation syncope near syncope compliant with meds. Had falls twice uses her walker. No chf symptoms opr angina.     5/8/2023   haD LEG SWELLING NOTHING IN CHANGE OF EATING HABITS.SHE HAS BEEN EVALUATED WITH NEPHROLOGY HEPATOLOGY AND RHEUMATOLOGY  WAS TOLD IT IS CARDIAC SAW DR EL HER LASIX WAS DOUBLED. SHE LOST 17 LBS SHE FEELS MUCH BETTER LEG SWELLING RESOLVED. SHE HAS NO CHEST PAIN. HER EF IS UNCHANGED. SHE STOPPED LYRICAL.    6/22/2023   Has still some residual exertional shortness her weight has been stable her cardiolite is negative she is in afib since 10/22 she is being followed by ep soon. She is compliant with salt intake.   Past Medical History:   Diagnosis Date    Age-related osteoporosis without current pathological fracture 8/20/2018    Anemia     Anxiety     Arthritis     Atrial flutter     Cancer     lymphoma Large cell B    CHF (congestive heart failure)     Chronic anemia 4/26/2017    Chronic midline low back pain with right-sided sciatica  8/20/2018    Coronary artery disease     01/2015 [facility] patent LCX. 50% stenosis in LAD and RCA.      Depression     Disorder of kidney and ureter     Encounter for blood transfusion     GERD (gastroesophageal reflux disease)     Gout, arthritis     Heart failure     Hx of psychiatric care     Hyperlipidemia     Hypertension     Hypothyroidism     Immune deficiency disorder     Kidney disease     Lung nodule 2014    RML--stable    Obesity     Pacemaker     Metronic    Paroxysmal atrial fibrillation 3/15/2018    Pneumonia     Polyneuropathy     chemo induced    Psychiatric problem     Rheumatoid arthritis involving multiple sites with positive rheumatoid factor 4/19/2023    Stroke 11/16/2020    Tobacco dependence     quit 1976    Trouble in sleeping     Unstable angina 11/27/2020       Past Surgical History:   Procedure Laterality Date    APPENDECTOMY  1966 approx    CARDIAC PACEMAKER PLACEMENT  01/22/2015    CHOLECYSTECTOMY  1993    incidental at time of gastric bypass    COLON SURGERY Right 2017    hemicolectomy    COLONOSCOPY N/A 4/6/2017    Procedure: COLONOSCOPY;  Surgeon: Tye Enamorado MD;  Location: [facility];  Service: Endoscopy;  Laterality: N/A;    COLONOSCOPY N/A 11/28/2018    Procedure: COLONOSCOPY;  Surgeon: Saúl Arthur III, MD;  Location: [facility];  Service: Endoscopy;  Laterality: N/A;    CORONARY ANGIOPLASTY  02/2014    CORONARY STENT PLACEMENT  02/05/2014    ESOPHAGOGASTRODUODENOSCOPY N/A 11/28/2018    Procedure: EGD (ESOPHAGOGASTRODUODENOSCOPY);  Surgeon: Saúl Arthur III, MD;  Location: [facility];  Service: Endoscopy;  Laterality: N/A;    GASTRIC BYPASS  1993    with incidental choly    HERNIA REPAIR      INJECTION OF ANESTHETIC AGENT INTO SACROILIAC JOINT Right 10/8/2020    Procedure: Right BLOCK, SACROILIAC JOINT with RN IV sedation;  Surgeon: Madi Anton MD;  Location: [facility];  Service: Pain Management;  Laterality: Right;    JOINT REPLACEMENT Bilateral 2009    3 months apart     "TONSILLECTOMY         Social History     Tobacco Use    Smoking status: Former     Packs/day: 2.00     Years: 6.00     Pack years: 12.00     Types: Cigarettes     Quit date: 3/15/1976     Years since quittin.3    Smokeless tobacco: Never   Substance Use Topics    Alcohol use: No     Alcohol/week: 0.0 standard drinks    Drug use: No       Family History   Problem Relation Age of Onset    Heart disease Mother     Hypertension Mother     Cataracts Mother     Stomach cancer Father         "ulcers that turned to cancer"    Cancer Father         stomach    Pancreatic cancer Sister     Cancer Sister         pancreatic    Leukemia Brother         "leukemia which led to intestinal cancer"    Cataracts Brother     Cancer Brother         leukemia then later stomach cancer    Drug abuse Son     Cancer Son         prostate cancer    Prostate cancer Son     COPD Daughter     Asthma Daughter     Hypertension Maternal Grandfather     Stroke Maternal Grandfather     Alcohol abuse Neg Hx     Diabetes Neg Hx     Mental retardation Neg Hx     Mental illness Neg Hx        Current Outpatient Medications   Medication Sig    allopurinoL (ZYLOPRIM) 300 MG tablet Take 1 tablet (300 mg total) by mouth once daily.    ascorbic acid, vitamin C, (VITAMIN C) 100 MG tablet Take by mouth once daily.    aspirin (ECOTRIN) 81 MG EC tablet Take 81 mg by mouth nightly.     azelastine (ASTELIN) 137 mcg (0.1 %) nasal spray 1 spray (137 mcg total) by Nasal route 2 (two) times daily.    busPIRone (BUSPAR) 7.5 MG tablet Take 1 tablet (7.5 mg total) by mouth 2 (two) times a day.    calcitRIOL (ROCALTROL) 0.25 MCG Cap TAKE ONE CAPSULE BY MOUTH EVERY MONDAY, WEDNESDAY AND FRIDAY    clopidogreL (PLAVIX) 75 mg tablet TAKE ONE TABLET BY MOUTH EVERY DAY    diclofenac sodium 1 % Gel Apply 2 g topically once daily. Apply 2 g over painful joints once or twice a day.    DIPRIVAN 10 mg/mL infusion     DULoxetine (CYMBALTA) 30 MG capsule TAKE ONE CAPSULE BY MOUTH " EVERY DAY    ELIQUIS 2.5 mg Tab Take 1 tablet (2.5 mg total) by mouth 2 (two) times daily.    fluticasone propionate (FLONASE) 50 mcg/actuation nasal spray 2 sprays (100 mcg total) by Each Nostril route once daily.    furosemide (LASIX) 40 MG tablet Take 1 tablet (40 mg total) by mouth 2 (two) times daily. (Patient taking differently: Take 40 mg by mouth once daily. 1 every day, then 2 tablets every other day)    hydrOXYchloroQUINE (PLAQUENIL) 200 mg tablet Take 1 tablet (200 mg total) by mouth once daily.    iron-vitamin C 100-250 mg, ICAR-C, 100-250 mg Tab TAKE ONE TABLET BY MOUTH EVERY DAY    levocetirizine (XYZAL) 5 MG tablet Take 1 tablet (5 mg total) by mouth every evening.    levothyroxine (SYNTHROID) 75 MCG tablet Take 1 tablet (75 mcg total) by mouth before breakfast.    methylPREDNISolone (MEDROL DOSEPACK) 4 mg tablet use as directed    metoprolol tartrate (LOPRESSOR) 25 MG tablet TAKE ONE TABLET BY MOUTH TWICE DAILY    multivitamin (THERAGRAN) per tablet Take 1 tablet by mouth once daily.    nitroGLYCERIN 0.2 mg/hr TD PT24 (NITRODUR) 0.2 mg/hr Place 1 patch onto the skin once daily.    pravastatin (PRAVACHOL) 40 MG tablet TAKE ONE TABLET BY MOUTH ONCE DAILY    sertraline (ZOLOFT) 100 MG tablet Take 1.5 tablets (150 mg total) by mouth once daily.    sodium bicarbonate 650 MG tablet Take 1 tablet (650 mg total) by mouth 2 (two) times daily.    VITAMIN D2 1,250 mcg (50,000 unit) capsule TAKE ONE CAPSULE BY MOUTH EVERY 7 DAYS    ZINC ACETATE ORAL Take 250 mg by mouth once daily.      Current Facility-Administered Medications   Medication    denosumab (PROLIA) injection 60 mg     Current Outpatient Medications on File Prior to Visit   Medication Sig    allopurinoL (ZYLOPRIM) 300 MG tablet Take 1 tablet (300 mg total) by mouth once daily.    ascorbic acid, vitamin C, (VITAMIN C) 100 MG tablet Take by mouth once daily.    aspirin (ECOTRIN) 81 MG EC tablet Take 81 mg by mouth nightly.     azelastine (ASTELIN) 137  mcg (0.1 %) nasal spray 1 spray (137 mcg total) by Nasal route 2 (two) times daily.    busPIRone (BUSPAR) 7.5 MG tablet Take 1 tablet (7.5 mg total) by mouth 2 (two) times a day.    calcitRIOL (ROCALTROL) 0.25 MCG Cap TAKE ONE CAPSULE BY MOUTH EVERY MONDAY, WEDNESDAY AND FRIDAY    clopidogreL (PLAVIX) 75 mg tablet TAKE ONE TABLET BY MOUTH EVERY DAY    diclofenac sodium 1 % Gel Apply 2 g topically once daily. Apply 2 g over painful joints once or twice a day.    DIPRIVAN 10 mg/mL infusion     DULoxetine (CYMBALTA) 30 MG capsule TAKE ONE CAPSULE BY MOUTH EVERY DAY    ELIQUIS 2.5 mg Tab Take 1 tablet (2.5 mg total) by mouth 2 (two) times daily.    fluticasone propionate (FLONASE) 50 mcg/actuation nasal spray 2 sprays (100 mcg total) by Each Nostril route once daily.    furosemide (LASIX) 40 MG tablet Take 1 tablet (40 mg total) by mouth 2 (two) times daily. (Patient taking differently: Take 40 mg by mouth once daily. 1 every day, then 2 tablets every other day)    hydrOXYchloroQUINE (PLAQUENIL) 200 mg tablet Take 1 tablet (200 mg total) by mouth once daily.    iron-vitamin C 100-250 mg, ICAR-C, 100-250 mg Tab TAKE ONE TABLET BY MOUTH EVERY DAY    levocetirizine (XYZAL) 5 MG tablet Take 1 tablet (5 mg total) by mouth every evening.    levothyroxine (SYNTHROID) 75 MCG tablet Take 1 tablet (75 mcg total) by mouth before breakfast.    methylPREDNISolone (MEDROL DOSEPACK) 4 mg tablet use as directed    metoprolol tartrate (LOPRESSOR) 25 MG tablet TAKE ONE TABLET BY MOUTH TWICE DAILY    multivitamin (THERAGRAN) per tablet Take 1 tablet by mouth once daily.    nitroGLYCERIN 0.2 mg/hr TD PT24 (NITRODUR) 0.2 mg/hr Place 1 patch onto the skin once daily.    pravastatin (PRAVACHOL) 40 MG tablet TAKE ONE TABLET BY MOUTH ONCE DAILY    sertraline (ZOLOFT) 100 MG tablet Take 1.5 tablets (150 mg total) by mouth once daily.    sodium bicarbonate 650 MG tablet Take 1 tablet (650 mg total) by mouth 2 (two) times daily.    VITAMIN D2  "1,250 mcg (50,000 unit) capsule TAKE ONE CAPSULE BY MOUTH EVERY 7 DAYS    ZINC ACETATE ORAL Take 250 mg by mouth once daily.      Current Facility-Administered Medications on File Prior to Visit   Medication    denosumab (PROLIA) injection 60 mg     Review of patient's allergies indicates:   Allergen Reactions    Corticosteroids (glucocorticoids) Nausea Only and Other (See Comments)     Stomach pain, dizziness, headache    Oxycodone Other (See Comments)     Blood pressure dropped      Review of Systems   Constitutional: Negative for diaphoresis, malaise/fatigue and weight gain.   HENT:  Negative for hoarse voice.    Eyes:  Negative for double vision and visual disturbance.   Cardiovascular:  Negative for chest pain, claudication, cyanosis, dyspnea on exertion, irregular heartbeat, leg swelling, near-syncope, orthopnea, palpitations, paroxysmal nocturnal dyspnea and syncope.   Respiratory:  Negative for cough, hemoptysis, shortness of breath and snoring.    Hematologic/Lymphatic: Negative for bleeding problem. Does not bruise/bleed easily.   Skin:  Negative for color change and poor wound healing.   Musculoskeletal:  Negative for muscle cramps, muscle weakness and myalgias.   Gastrointestinal:  Negative for bloating, abdominal pain, change in bowel habit, diarrhea, heartburn, hematemesis, hematochezia, melena and nausea.   Neurological:  Negative for excessive daytime sleepiness, dizziness, headaches, light-headedness, loss of balance, numbness and weakness.   Psychiatric/Behavioral:  Negative for memory loss. The patient does not have insomnia.    Allergic/Immunologic: Negative for hives.     Objective:   Physical Exam  Vitals:    06/22/23 0930 06/22/23 0932   BP: 126/82 124/80   BP Location: Right arm Left arm   Patient Position: Sitting Sitting   BP Method: Medium (Manual) Medium (Manual)   Pulse: 88    SpO2: 99%    Weight: 59 kg (130 lb 1.1 oz)    Height: 5' 4" (1.626 m)      Lab Results   Component Value Date    " CHOL 139 06/16/2023    CHOL 153 03/30/2022    CHOL 131 11/30/2021      Body mass index is 22.33 kg/m².   Lab Results   Component Value Date    HGBA1C 4.9 11/16/2020      BMP  Lab Results   Component Value Date     06/16/2023    K 4.4 06/16/2023     (H) 06/16/2023    CO2 21 (L) 06/16/2023    BUN 30 (H) 06/16/2023    CREATININE 1.4 06/16/2023    CALCIUM 9.1 06/16/2023    ANIONGAP 11 06/16/2023    EGFRNORACEVR 37.6 (A) 06/16/2023      Lab Results   Component Value Date    HDL 63 06/16/2023    HDL 67 03/30/2022    HDL 61 11/30/2021     Lab Results   Component Value Date    LDLCALC 56.6 (L) 06/16/2023    LDLCALC 69.8 03/30/2022    LDLCALC 55.2 (L) 11/30/2021     Lab Results   Component Value Date    TRIG 97 06/16/2023    TRIG 81 03/30/2022    TRIG 74 11/30/2021     Lab Results   Component Value Date    CHOLHDL 45.3 06/16/2023    CHOLHDL 43.8 03/30/2022    CHOLHDL 46.6 11/30/2021       Chemistry        Component Value Date/Time     06/16/2023 1114    K 4.4 06/16/2023 1114     (H) 06/16/2023 1114    CO2 21 (L) 06/16/2023 1114    BUN 30 (H) 06/16/2023 1114    CREATININE 1.4 06/16/2023 1114    GLU 73 06/16/2023 1114        Component Value Date/Time    CALCIUM 9.1 06/16/2023 1114    ALKPHOS 43 (L) 06/16/2023 1114    AST 62 (H) 06/16/2023 1114    ALT 43 06/16/2023 1114    BILITOT 0.4 06/16/2023 1114    ESTGFRAFRICA 35 (A) 05/31/2022 0909    EGFRNONAA 30 (A) 05/31/2022 0909          Lab Results   Component Value Date    TSH 9.408 (H) 04/19/2023     Lab Results   Component Value Date    INR 1.0 04/03/2023    INR 0.9 11/07/2021    INR 1.0 11/06/2021     Lab Results   Component Value Date    WBC 6.04 04/03/2023    HGB 10.4 (L) 04/03/2023    HCT 33.7 (L) 04/03/2023     (H) 04/03/2023     04/03/2023     BMP  Sodium   Date Value Ref Range Status   06/16/2023 143 136 - 145 mmol/L Final     Potassium   Date Value Ref Range Status   06/16/2023 4.4 3.5 - 5.1 mmol/L Final     Chloride   Date Value Ref  Range Status   06/16/2023 111 (H) 95 - 110 mmol/L Final     CO2   Date Value Ref Range Status   06/16/2023 21 (L) 23 - 29 mmol/L Final     BUN   Date Value Ref Range Status   06/16/2023 30 (H) 8 - 23 mg/dL Final     Creatinine   Date Value Ref Range Status   06/16/2023 1.4 0.5 - 1.4 mg/dL Final     Calcium   Date Value Ref Range Status   06/16/2023 9.1 8.7 - 10.5 mg/dL Final     Anion Gap   Date Value Ref Range Status   06/16/2023 11 8 - 16 mmol/L Final     eGFR if    Date Value Ref Range Status   05/31/2022 35 (A) >60 mL/min/1.73 m^2 Final     eGFR if non    Date Value Ref Range Status   05/31/2022 30 (A) >60 mL/min/1.73 m^2 Final     Comment:     Calculation used to obtain the estimated glomerular filtration  rate (eGFR) is the CKD-EPI equation.        Estimated Creatinine Clearance: 26.8 mL/min (based on SCr of 1.4 mg/dL).    Conclusion         Normal myocardial perfusion scan. There is no evidence of myocardial ischemia or infarction.    There is a trivial fixed anteroseptal perfusion abnormality consistent with the patient's underlying A sensing and V pacing    The gated perfusion images showed an ejection fraction of 62% at rest. The gated perfusion images showed an ejection fraction of 49% post stress.     Pt experienced chest heaviness post infusion- resolved by the end of recovery.    1. Angina pectoris    2. Atherosclerosis of aorta    3. Paroxysmal atrial fibrillation    4. Cardiac pacemaker in situ    5. Chronic diastolic heart failure    6. Coronary artery disease : multiple vessels, s/p PTCA    7. Essential hypertension    8. Ischemic cardiomyopathy    9. Mixed hyperlipidemia    10. Postural hypotension    11. Stage 3b chronic kidney disease    12. Hyperkalemia    13. Rheumatoid arthritis involving multiple sites with positive rheumatoid factor    14. Immunosuppression due to drug therapy    15. Centromere antibody positive      Appears to have respond well she will  follow with ep to decide on afib therapy and will continue low salt diet.  Has no evidence of ischemia on cardiolite   Reassure  Lipids on target stable continue same.  Afib continue eliquis rate controlled  Plan:   Continue current therapy  Cardiac low salt diet.  Risk factor modification and excercise program.    F/u in 6 months with lipid cmp

## 2023-06-23 ENCOUNTER — HOSPITAL ENCOUNTER (OUTPATIENT)
Dept: CARDIOLOGY | Facility: HOSPITAL | Age: 83
Discharge: HOME | End: 2023-06-23
Attending: INTERNAL MEDICINE
Payer: MEDICARE

## 2023-06-23 ENCOUNTER — OFFICE VISIT (OUTPATIENT)
Dept: CARDIOLOGY | Facility: CLINIC | Age: 83
End: 2023-06-23
Payer: MEDICARE

## 2023-06-23 VITALS
HEIGHT: 64 IN | OXYGEN SATURATION: 100 % | DIASTOLIC BLOOD PRESSURE: 80 MMHG | WEIGHT: 132.5 LBS | BODY MASS INDEX: 22.62 KG/M2 | HEART RATE: 63 BPM | SYSTOLIC BLOOD PRESSURE: 120 MMHG

## 2023-06-23 DIAGNOSIS — I48.0 PAROXYSMAL ATRIAL FIBRILLATION: Primary | ICD-10-CM

## 2023-06-23 DIAGNOSIS — I50.32 CHRONIC DIASTOLIC HEART FAILURE: ICD-10-CM

## 2023-06-23 DIAGNOSIS — E78.2 MIXED HYPERLIPIDEMIA: Chronic | ICD-10-CM

## 2023-06-23 DIAGNOSIS — Z98.84 S/P GASTRIC BYPASS: Chronic | ICD-10-CM

## 2023-06-23 DIAGNOSIS — I50.22 CHRONIC SYSTOLIC CONGESTIVE HEART FAILURE: Primary | ICD-10-CM

## 2023-06-23 DIAGNOSIS — I95.1 POSTURAL HYPOTENSION: ICD-10-CM

## 2023-06-23 DIAGNOSIS — E21.3 HYPERPARATHYROIDISM: ICD-10-CM

## 2023-06-23 DIAGNOSIS — I10 ESSENTIAL HYPERTENSION: Chronic | ICD-10-CM

## 2023-06-23 DIAGNOSIS — D53.9 MACROCYTIC ANEMIA: ICD-10-CM

## 2023-06-23 DIAGNOSIS — Z95.0 CARDIAC PACEMAKER IN SITU: ICD-10-CM

## 2023-06-23 DIAGNOSIS — I48.0 PAROXYSMAL ATRIAL FIBRILLATION: ICD-10-CM

## 2023-06-23 DIAGNOSIS — C83.30 DIFFUSE LARGE B-CELL LYMPHOMA, UNSPECIFIED BODY REGION: ICD-10-CM

## 2023-06-23 DIAGNOSIS — I25.5 ISCHEMIC CARDIOMYOPATHY: Chronic | ICD-10-CM

## 2023-06-23 DIAGNOSIS — N18.32 STAGE 3B CHRONIC KIDNEY DISEASE: ICD-10-CM

## 2023-06-23 DIAGNOSIS — I25.10 CORONARY ARTERY DISEASE INVOLVING NATIVE CORONARY ARTERY OF NATIVE HEART WITHOUT ANGINA PECTORIS: Chronic | ICD-10-CM

## 2023-06-23 DIAGNOSIS — I50.9 CONGESTIVE HEART FAILURE, UNSPECIFIED HF CHRONICITY, UNSPECIFIED HEART FAILURE TYPE: Primary | ICD-10-CM

## 2023-06-23 DIAGNOSIS — I70.0 ATHEROSCLEROSIS OF AORTA: ICD-10-CM

## 2023-06-23 DIAGNOSIS — D64.9 CHRONIC ANEMIA: Chronic | ICD-10-CM

## 2023-06-23 DIAGNOSIS — E03.9 HYPOTHYROIDISM (ACQUIRED): Chronic | ICD-10-CM

## 2023-06-23 PROCEDURE — 3074F PR MOST RECENT SYSTOLIC BLOOD PRESSURE < 130 MM HG: ICD-10-PCS | Mod: HCNC,CPTII,S$GLB, | Performed by: INTERNAL MEDICINE

## 2023-06-23 PROCEDURE — 3288F FALL RISK ASSESSMENT DOCD: CPT | Mod: HCNC,CPTII,S$GLB, | Performed by: INTERNAL MEDICINE

## 2023-06-23 PROCEDURE — 1157F PR ADVANCE CARE PLAN OR EQUIV PRESENT IN MEDICAL RECORD: ICD-10-PCS | Mod: HCNC,CPTII,S$GLB, | Performed by: INTERNAL MEDICINE

## 2023-06-23 PROCEDURE — 1157F ADVNC CARE PLAN IN RCRD: CPT | Mod: HCNC,CPTII,S$GLB, | Performed by: INTERNAL MEDICINE

## 2023-06-23 PROCEDURE — 3079F DIAST BP 80-89 MM HG: CPT | Mod: HCNC,CPTII,S$GLB, | Performed by: INTERNAL MEDICINE

## 2023-06-23 PROCEDURE — 1101F PT FALLS ASSESS-DOCD LE1/YR: CPT | Mod: HCNC,CPTII,S$GLB, | Performed by: INTERNAL MEDICINE

## 2023-06-23 PROCEDURE — 93010 EKG 12-LEAD: ICD-10-PCS | Mod: HCNC,,, | Performed by: INTERNAL MEDICINE

## 2023-06-23 PROCEDURE — 99205 PR OFFICE/OUTPT VISIT, NEW, LEVL V, 60-74 MIN: ICD-10-PCS | Mod: HCNC,S$GLB,, | Performed by: INTERNAL MEDICINE

## 2023-06-23 PROCEDURE — 1159F PR MEDICATION LIST DOCUMENTED IN MEDICAL RECORD: ICD-10-PCS | Mod: HCNC,CPTII,S$GLB, | Performed by: INTERNAL MEDICINE

## 2023-06-23 PROCEDURE — 1159F MED LIST DOCD IN RCRD: CPT | Mod: HCNC,CPTII,S$GLB, | Performed by: INTERNAL MEDICINE

## 2023-06-23 PROCEDURE — 1160F RVW MEDS BY RX/DR IN RCRD: CPT | Mod: HCNC,CPTII,S$GLB, | Performed by: INTERNAL MEDICINE

## 2023-06-23 PROCEDURE — 3074F SYST BP LT 130 MM HG: CPT | Mod: HCNC,CPTII,S$GLB, | Performed by: INTERNAL MEDICINE

## 2023-06-23 PROCEDURE — 99999 PR PBB SHADOW E&M-EST. PATIENT-LVL V: ICD-10-PCS | Mod: PBBFAC,HCNC,, | Performed by: INTERNAL MEDICINE

## 2023-06-23 PROCEDURE — 3079F PR MOST RECENT DIASTOLIC BLOOD PRESSURE 80-89 MM HG: ICD-10-PCS | Mod: HCNC,CPTII,S$GLB, | Performed by: INTERNAL MEDICINE

## 2023-06-23 PROCEDURE — 93010 ELECTROCARDIOGRAM REPORT: CPT | Mod: HCNC,,, | Performed by: INTERNAL MEDICINE

## 2023-06-23 PROCEDURE — 1101F PR PT FALLS ASSESS DOC 0-1 FALLS W/OUT INJ PAST YR: ICD-10-PCS | Mod: HCNC,CPTII,S$GLB, | Performed by: INTERNAL MEDICINE

## 2023-06-23 PROCEDURE — 1126F AMNT PAIN NOTED NONE PRSNT: CPT | Mod: HCNC,CPTII,S$GLB, | Performed by: INTERNAL MEDICINE

## 2023-06-23 PROCEDURE — 1126F PR PAIN SEVERITY QUANTIFIED, NO PAIN PRESENT: ICD-10-PCS | Mod: HCNC,CPTII,S$GLB, | Performed by: INTERNAL MEDICINE

## 2023-06-23 PROCEDURE — 93005 ELECTROCARDIOGRAM TRACING: CPT | Mod: HCNC

## 2023-06-23 PROCEDURE — 1160F PR REVIEW ALL MEDS BY PRESCRIBER/CLIN PHARMACIST DOCUMENTED: ICD-10-PCS | Mod: HCNC,CPTII,S$GLB, | Performed by: INTERNAL MEDICINE

## 2023-06-23 PROCEDURE — 99999 PR PBB SHADOW E&M-EST. PATIENT-LVL V: CPT | Mod: PBBFAC,HCNC,, | Performed by: INTERNAL MEDICINE

## 2023-06-23 PROCEDURE — 99205 OFFICE O/P NEW HI 60 MIN: CPT | Mod: HCNC,S$GLB,, | Performed by: INTERNAL MEDICINE

## 2023-06-23 PROCEDURE — 3288F PR FALLS RISK ASSESSMENT DOCUMENTED: ICD-10-PCS | Mod: HCNC,CPTII,S$GLB, | Performed by: INTERNAL MEDICINE

## 2023-06-26 ENCOUNTER — LAB VISIT (OUTPATIENT)
Dept: LAB | Facility: HOSPITAL | Age: 83
End: 2023-06-26
Attending: INTERNAL MEDICINE
Payer: MEDICARE

## 2023-06-26 ENCOUNTER — TELEPHONE (OUTPATIENT)
Dept: CARDIOLOGY | Facility: CLINIC | Age: 83
End: 2023-06-26
Payer: MEDICARE

## 2023-06-26 ENCOUNTER — PATIENT MESSAGE (OUTPATIENT)
Dept: CARDIOLOGY | Facility: CLINIC | Age: 83
End: 2023-06-26
Payer: MEDICARE

## 2023-06-26 DIAGNOSIS — E87.20 ACIDOSIS: ICD-10-CM

## 2023-06-26 DIAGNOSIS — I50.32 CHRONIC DIASTOLIC HEART FAILURE: Primary | ICD-10-CM

## 2023-06-26 DIAGNOSIS — I50.32 CHRONIC DIASTOLIC HEART FAILURE: ICD-10-CM

## 2023-06-26 LAB — BNP SERPL-MCNC: 1438 PG/ML (ref 0–99)

## 2023-06-26 PROCEDURE — 83880 ASSAY OF NATRIURETIC PEPTIDE: CPT | Mod: HCNC | Performed by: INTERNAL MEDICINE

## 2023-06-26 PROCEDURE — 36415 COLL VENOUS BLD VENIPUNCTURE: CPT | Mod: HCNC | Performed by: INTERNAL MEDICINE

## 2023-06-26 RX ORDER — SODIUM BICARBONATE 650 MG/1
TABLET ORAL
Qty: 60 TABLET | Refills: 11 | Status: SHIPPED | OUTPATIENT
Start: 2023-06-26

## 2023-06-26 NOTE — TELEPHONE ENCOUNTER
Telephoned patient to review lab results- left voice mail and sent portal message.      ----- Message from Velasquez Vizcaino MD sent at 6/24/2023  5:01 PM CDT -----  Pre-op labs are OK to proceed with the intended procedure in the next few days.  Thanks

## 2023-06-26 NOTE — TELEPHONE ENCOUNTER
BNP lab was not able to us specimen. Pt was asked to lab at [facility]. pb      ----- Message from Nesha Mendez sent at 6/26/2023 12:44 AM CDT -----  Regarding: Lab Client Services  Contact: [phone]  Good Morning,     My name is Nesha Mendez I work in the Lab Client Services. We had a problem with some lab work on this patient. If someone from your office could call us at [phone] or ext. [phone] that would be great. Anyone in my department can help.      Thank you

## 2023-06-27 DIAGNOSIS — I48.0 PAROXYSMAL ATRIAL FIBRILLATION: ICD-10-CM

## 2023-06-27 DIAGNOSIS — I50.32 CHRONIC DIASTOLIC HEART FAILURE: Primary | ICD-10-CM

## 2023-06-27 DIAGNOSIS — Z45.010 ENCOUNTER FOR SERVICING OF PACEMAKER AT END OF BATTERY LIFE: ICD-10-CM

## 2023-06-27 DIAGNOSIS — I25.5 ISCHEMIC CARDIOMYOPATHY: ICD-10-CM

## 2023-06-27 DIAGNOSIS — Z95.0 CARDIAC PACEMAKER IN SITU: ICD-10-CM

## 2023-06-28 ENCOUNTER — TELEPHONE (OUTPATIENT)
Dept: CARDIOLOGY | Facility: CLINIC | Age: 83
End: 2023-06-28
Payer: MEDICARE

## 2023-06-28 PROBLEM — I50.22 CHRONIC SYSTOLIC CONGESTIVE HEART FAILURE: Status: ACTIVE | Noted: 2023-06-28

## 2023-06-28 NOTE — H&P (VIEW-ONLY)
Subjective:   Patient ID:  Violet Swenson is a 83 y.o. female     Chief complaint:CHF    HPI  New patient to me. (06/28/2023 )  Referred by Dr Gil for evaluation and management of CHF/AF/PPM   --   Background as gleaned from patient's records and today's interview :  82 yo female with a h/o anemia, anxiety, atrial flutter, lymphoma large cell B, CHF, CAD, depression, GERD, gout, heart failure, HLD, HTN, hypothyroidism kidney disease, lung nodule, obesity, polyneuropathy.  Also CHB with MDT DC implant in 2015  AF noted as of Aug 2021    A year ago had stroke with right arm weakness, slurred speech, and gait instability.    Has had chronic chest pain. Cardiolite was negative.      Admitted in Nov 2021 for AF/CHF/MR >> diuresed , DCCV ii/11/21 - back in AF by 12/1/21    Recent CHF Sx exacerbation with leg swelling etc  >> Lasix dose was doubled.      Seen on 6/22/2023 by Dr Gil who noted:   Has still some residual exertional shortness her weight has been stable her cardiolite is negative she is in afib since 10/22 she is being followed by ep soon. She is compliant with salt intake.     Other relevant data:  EF has been slowly declining over the years - now at 40% despite mod MR  She is 100 % RV paced  Underlying rhythm is AF with AMITA at 33 bpm and RBBB  She continues to be quite short of breath.         Past Medical History:     Current Outpatient Medications   Medication Sig    allopurinoL (ZYLOPRIM) 300 MG tablet Take 1 tablet (300 mg total) by mouth once daily.    ascorbic acid, vitamin C, (VITAMIN C) 100 MG tablet Take by mouth once daily.    aspirin (ECOTRIN) 81 MG EC tablet Take 81 mg by mouth nightly.     azelastine (ASTELIN) 137 mcg (0.1 %) nasal spray 1 spray (137 mcg total) by Nasal route 2 (two) times daily.    busPIRone (BUSPAR) 7.5 MG tablet Take 1 tablet (7.5 mg total) by mouth 2 (two) times a day.    calcitRIOL (ROCALTROL) 0.25 MCG Cap TAKE ONE CAPSULE BY MOUTH EVERY MONDAY, WEDNESDAY  AND FRIDAY    clopidogreL (PLAVIX) 75 mg tablet TAKE ONE TABLET BY MOUTH EVERY DAY    diclofenac sodium 1 % Gel Apply 2 g topically once daily. Apply 2 g over painful joints once or twice a day.    DIPRIVAN 10 mg/mL infusion     DULoxetine (CYMBALTA) 30 MG capsule TAKE ONE CAPSULE BY MOUTH EVERY DAY    ELIQUIS 2.5 mg Tab Take 1 tablet (2.5 mg total) by mouth 2 (two) times daily.    fluticasone propionate (FLONASE) 50 mcg/actuation nasal spray 2 sprays (100 mcg total) by Each Nostril route once daily.    furosemide (LASIX) 40 MG tablet Take 1 tablet (40 mg total) by mouth 2 (two) times daily. (Patient taking differently: Take 40 mg by mouth once daily. 1 every day, then 2 tablets every other day)    hydrOXYchloroQUINE (PLAQUENIL) 200 mg tablet Take 1 tablet (200 mg total) by mouth once daily.    iron-vitamin C 100-250 mg, ICAR-C, 100-250 mg Tab TAKE ONE TABLET BY MOUTH EVERY DAY    levocetirizine (XYZAL) 5 MG tablet Take 1 tablet (5 mg total) by mouth every evening.    levothyroxine (SYNTHROID) 75 MCG tablet Take 1 tablet (75 mcg total) by mouth before breakfast.    methylPREDNISolone (MEDROL DOSEPACK) 4 mg tablet use as directed    metoprolol tartrate (LOPRESSOR) 25 MG tablet TAKE ONE TABLET BY MOUTH TWICE DAILY    multivitamin (THERAGRAN) per tablet Take 1 tablet by mouth once daily.    nitroGLYCERIN 0.2 mg/hr TD PT24 (NITRODUR) 0.2 mg/hr Place 1 patch onto the skin once daily.    pravastatin (PRAVACHOL) 40 MG tablet TAKE ONE TABLET BY MOUTH ONCE DAILY    sertraline (ZOLOFT) 100 MG tablet Take 1.5 tablets (150 mg total) by mouth once daily.    VITAMIN D2 1,250 mcg (50,000 unit) capsule TAKE ONE CAPSULE BY MOUTH EVERY 7 DAYS    ZINC ACETATE ORAL Take 250 mg by mouth once daily.     sodium bicarbonate 650 MG tablet TAKE ONE TABLET BY MOUTH TWICE DAILY     Current Facility-Administered Medications   Medication    denosumab (PROLIA) injection 60 mg       Review of Systems     Constitutional: Reviewed  for decreased  appetite, weight gain and weight loss.   HENT: Reviewed for nosebleeds.    Eyes:  Reviewed for blurred vision and visual disturbance.   Cardiovascular: Reviewed for chest pain, claudication, cyanosis,dyspnea on exertion, leg swelling, orthopnea,paroxysmal nocturnal dyspnearregular heartbeats, palpitations, near-syncope, and syncope.   Respiratory: Reviewed for cough, shortness of breath, wheezing, sleep disturbances due to breathing and snoring, .    Endocrine: Reviewed for heat intolerance.   Hematologic/Lymphatic: Reviewed for easy bruisability/bleeding.   Skin: Reviewed for rash.   Musculoskeletal: Reviewed for muscle weakness and myalgias.   Gastrointestinal: Reviewed for abdominal pain, anorexia, melena, nausea and vomiting.   Genitourinary: Reviewed for menorrhagia, frequency, nocturia and incontinence.   Neurological: Reviewed for excessive daytime sleepiness, dizziness, vertigo, weakness, headaches, loss of balance and seizures,   Psychiatric/Behavioral:  Reviewed for insomnia, altered mental status, depression, anxiety and nervousness.       All symptoms reviewed above were negative except for   Chest pain, dyspnea on exertion, leg edema, palpitations and near fainting.  There are also complaints of pain with diarrhea, frequent urination, nocturia, easy bruisability.  She endorses dizziness, generalized weakness, headaches, loss of balance tingling and arthritic complaints as well as anxiety and depression.     Social History     Tobacco Use   Smoking Status Former    Packs/day: 2.00    Years: 6.00    Pack years: 12.00    Types: Cigarettes    Quit date: 3/15/1976    Years since quittin.3   Smokeless Tobacco Never       reports no history of alcohol use.   Past Medical History:   Diagnosis Date    Age-related osteoporosis without current pathological fracture 2018    Anemia     Anxiety     Arthritis     Atrial flutter     Cancer     lymphoma Large cell B    CHF (congestive heart failure)      "Chronic anemia 2017    Chronic midline low back pain with right-sided sciatica 2018    Coronary artery disease     2015 LHC patent LCX. 50% stenosis in LAD and RCA.      Depression     Disorder of kidney and ureter     Encounter for blood transfusion     GERD (gastroesophageal reflux disease)     Gout, arthritis     Heart failure     Hx of psychiatric care     Hyperlipidemia     Hypertension     Hypothyroidism     Immune deficiency disorder     Kidney disease     Lung nodule     RML--stable    Obesity     Pacemaker     Metronic    Paroxysmal atrial fibrillation 3/15/2018    Pneumonia     Polyneuropathy     chemo induced    Psychiatric problem     Rheumatoid arthritis involving multiple sites with positive rheumatoid factor 2023    Stroke 2020    Tobacco dependence     quit     Trouble in sleeping     Unstable angina 2020     Family History   Problem Relation Age of Onset    Heart disease Mother     Hypertension Mother     Cataracts Mother     Stomach cancer Father         "ulcers that turned to cancer"    Cancer Father         stomach    Pancreatic cancer Sister     Cancer Sister         pancreatic    Leukemia Brother         "leukemia which led to intestinal cancer"    Cataracts Brother     Cancer Brother         leukemia then later stomach cancer    Drug abuse Son     Cancer Son         prostate cancer    Prostate cancer Son     COPD Daughter     Asthma Daughter     Hypertension Maternal Grandfather     Stroke Maternal Grandfather     Alcohol abuse Neg Hx     Diabetes Neg Hx     Mental retardation Neg Hx     Mental illness Neg Hx      Social History     Socioeconomic History    Marital status:     Number of children: 4   Occupational History    Occupation: Retired   Tobacco Use    Smoking status: Former     Packs/day: 2.00     Years: 6.00     Pack years: 12.00     Types: Cigarettes     Quit date: 3/15/1976     Years since quittin.3    Smokeless tobacco: Never "   Substance and Sexual Activity    Alcohol use: No     Alcohol/week: 0.0 standard drinks    Drug use: No    Sexual activity: Not Currently     Partners: Male   Social History Narrative    , lives with . She is a retired . 4 children (3 natural born, 1 adopted)- 2 ; 2x  (currently 17 years); son has prostate cancer, daughter COPD,  cardiac difficulty. She still drives. Does not have a Living will or Advanced Directive.     Past Surgical History:   Procedure Laterality Date    APPENDECTOMY  1966 approx    CARDIAC PACEMAKER PLACEMENT  2015    CHOLECYSTECTOMY  1993    incidental at time of gastric bypass    COLON SURGERY Right 2017    hemicolectomy    COLONOSCOPY N/A 2017    Procedure: COLONOSCOPY;  Surgeon: Tye Enamorado MD;  Location: [facility];  Service: Endoscopy;  Laterality: N/A;    COLONOSCOPY N/A 2018    Procedure: COLONOSCOPY;  Surgeon: Saúl Arthur III, MD;  Location: [facility];  Service: Endoscopy;  Laterality: N/A;    CORONARY ANGIOPLASTY  2014    CORONARY STENT PLACEMENT  2014    ESOPHAGOGASTRODUODENOSCOPY N/A 2018    Procedure: EGD (ESOPHAGOGASTRODUODENOSCOPY);  Surgeon: Saúl Arthur III, MD;  Location: [facility];  Service: Endoscopy;  Laterality: N/A;    GASTRIC BYPASS      with incidental choly    HERNIA REPAIR      INJECTION OF ANESTHETIC AGENT INTO SACROILIAC JOINT Right 10/8/2020    Procedure: Right BLOCK, SACROILIAC JOINT with RN IV sedation;  Surgeon: Madi Anton MD;  Location: [facility];  Service: Pain Management;  Laterality: Right;    JOINT REPLACEMENT Bilateral     3 months apart    TONSILLECTOMY         Objective:   Physical Exam  Vitals and nursing note reviewed.   Constitutional:       Appearance: She is well-developed.   HENT:      Head: Normocephalic and atraumatic.      Right Ear: External ear normal.      Left Ear: External ear normal.   Eyes:      General: No scleral icterus.         "Left eye: No discharge.      Conjunctiva/sclera: Conjunctivae normal.      Pupils: Pupils are equal, round, and reactive to light.   Neck:      Thyroid: No thyromegaly.   Cardiovascular:      Rate and Rhythm: Normal rate and regular rhythm.      Pulses: Intact distal pulses.           Carotid pulses are 2+ on the right side and 2+ on the left side.       Radial pulses are 2+ on the right side and 2+ on the left side.        Dorsalis pedis pulses are 2+ on the right side and 2+ on the left side.        Posterior tibial pulses are 2+ on the right side and 2+ on the left side.      Heart sounds: Normal heart sounds. No midsystolic click and no opening snap. No murmur heard.    No friction rub. No gallop. No S3 or S4 sounds.   Pulmonary:      Effort: Pulmonary effort is normal.      Breath sounds: Normal breath sounds.   Chest:      Comments: Device pocket is in good repair.  Abdominal:      General: There is no distension.      Palpations: Abdomen is soft. There is no hepatomegaly.      Tenderness: There is no abdominal tenderness. There is no guarding.   Musculoskeletal:      Cervical back: Normal range of motion and neck supple.      Right lower leg: No swelling.      Left lower leg: No swelling.      Right ankle: No swelling.      Left ankle: No swelling.   Skin:     General: Skin is warm and dry.      Findings: No rash.      Nails: There is no clubbing.   Neurological:      Mental Status: She is alert and oriented to person, place, and time.      Cranial Nerves: No cranial nerve deficit.      Gait: Gait normal.   Psychiatric:         Speech: Speech normal.         Behavior: Behavior normal.         Thought Content: Thought content normal.     /80 (BP Location: Left arm, Patient Position: Sitting, BP Method: Medium (Manual))   Pulse 63   Ht 5' 4" (1.626 m)   Wt 60.1 kg (132 lb 7.9 oz)   SpO2 100%   BMI 22.74 kg/m²          Results for orders placed during the hospital encounter of " 04/28/23    Echo    Interpretation Summary  · The left ventricle is normal in size with eccentric hypertrophy and mildly decreased systolic function.  · Severe left atrial enlargement.  · Grade III left ventricular diastolic dysfunction.  · The estimated PA systolic pressure is 32 mmHg.  · Normal right ventricular size with normal right ventricular systolic function.  · Intermediate central venous pressure (8 mmHg).  · The estimated ejection fraction is 40%.  · There is abnormal septal wall motion consistent with right ventricular pacemaker.  · Moderate mitral regurgitation.  · Mild tricuspid regurgitation.    WBC   Date Value Ref Range Status   06/23/2023 6.67 3.90 - 12.70 K/uL Final     POC Hematocrit   Date Value Ref Range Status   04/07/2017 35 (L) 36 - 54 %PCV Final     Hematocrit   Date Value Ref Range Status   06/23/2023 37.6 37.0 - 48.5 % Final     Hemoglobin   Date Value Ref Range Status   06/23/2023 12.2 12.0 - 16.0 g/dL Final     Lab Results   Component Value Date     06/23/2023     Lab Results   Component Value Date    CREATININE 1.6 (H) 06/23/2023    EGFRNORACEVR 32.0 (A) 06/23/2023    K 4.6 06/23/2023     Lab Results   Component Value Date    BNP 1,438 (H) 06/26/2023         Assessment:      Significant chronic congestive heart failure.  The this is at least worsened by RV pacing if not caused by it.  She does have narrow QRS   Junctional escape bradycardia with slight right bundle branch block.  1. Chronic systolic congestive heart failure    2. Cardiac pacemaker in situ    3. Paroxysmal atrial fibrillation    4. Postural hypotension    5. Atherosclerosis of aorta    6. Ischemic cardiomyopathy    7. Coronary artery disease : multiple vessels, s/p PTCA    8. Essential hypertension    9. Mixed hyperlipidemia    10. Chronic diastolic heart failure    11. Stage 3b chronic kidney disease    12. Chronic anemia    13. Diffuse large B-cell lymphoma, unspecified body region    14. Macrocytic anemia     15. Hypothyroidism (acquired)    16. S/P gastric bypass    17. Hyperparathyroidism        Plan:     Will need to upgrade her pacemaker.  Ideally his pacing lead would be the best approach.  Alternatively, an upgrade to CRT would be an acceptable solution.  Initially will start with the venograms we can map are next step.  I have discussed the   Venogram and then upgrade procedures in detail with the patient. I described its benefits and risks. I reviewed alternative therapies and discussed their potential value. The patient was given ample opportunity to express concerns and ask questions and I provided appropriate responses and  answers to such.The patient understands and agrees to proceed.  Consent form was signed  by patient and myself and appropriately witnessed.     No orders of the defined types were placed in this encounter.    Follow up if symptoms worsen or fail to improve, for post work up, post op.  There are no discontinued medications.  Outpatient Encounter Medications as of 6/23/2023   Medication Sig Dispense Refill    allopurinoL (ZYLOPRIM) 300 MG tablet Take 1 tablet (300 mg total) by mouth once daily. 90 tablet 11    ascorbic acid, vitamin C, (VITAMIN C) 100 MG tablet Take by mouth once daily.      aspirin (ECOTRIN) 81 MG EC tablet Take 81 mg by mouth nightly.       azelastine (ASTELIN) 137 mcg (0.1 %) nasal spray 1 spray (137 mcg total) by Nasal route 2 (two) times daily. 30 mL 0    busPIRone (BUSPAR) 7.5 MG tablet Take 1 tablet (7.5 mg total) by mouth 2 (two) times a day. 180 tablet 3    calcitRIOL (ROCALTROL) 0.25 MCG Cap TAKE ONE CAPSULE BY MOUTH EVERY MONDAY, WEDNESDAY AND FRIDAY 12 capsule 0    clopidogreL (PLAVIX) 75 mg tablet TAKE ONE TABLET BY MOUTH EVERY DAY 30 tablet 6    diclofenac sodium 1 % Gel Apply 2 g topically once daily. Apply 2 g over painful joints once or twice a day. 100 g 6    DIPRIVAN 10 mg/mL infusion       DULoxetine (CYMBALTA) 30 MG capsule TAKE ONE CAPSULE BY MOUTH  EVERY DAY 30 capsule 11    ELIQUIS 2.5 mg Tab Take 1 tablet (2.5 mg total) by mouth 2 (two) times daily. 180 tablet 3    fluticasone propionate (FLONASE) 50 mcg/actuation nasal spray 2 sprays (100 mcg total) by Each Nostril route once daily. 16 g 11    furosemide (LASIX) 40 MG tablet Take 1 tablet (40 mg total) by mouth 2 (two) times daily. (Patient taking differently: Take 40 mg by mouth once daily. 1 every day, then 2 tablets every other day) 60 tablet 11    hydrOXYchloroQUINE (PLAQUENIL) 200 mg tablet Take 1 tablet (200 mg total) by mouth once daily. 90 tablet 3    iron-vitamin C 100-250 mg, ICAR-C, 100-250 mg Tab TAKE ONE TABLET BY MOUTH EVERY DAY 30 tablet 4    levocetirizine (XYZAL) 5 MG tablet Take 1 tablet (5 mg total) by mouth every evening. 30 tablet 11    levothyroxine (SYNTHROID) 75 MCG tablet Take 1 tablet (75 mcg total) by mouth before breakfast. 90 tablet 3    methylPREDNISolone (MEDROL DOSEPACK) 4 mg tablet use as directed 21 tablet 0    metoprolol tartrate (LOPRESSOR) 25 MG tablet TAKE ONE TABLET BY MOUTH TWICE DAILY 60 tablet 6    multivitamin (THERAGRAN) per tablet Take 1 tablet by mouth once daily.      nitroGLYCERIN 0.2 mg/hr TD PT24 (NITRODUR) 0.2 mg/hr Place 1 patch onto the skin once daily. 30 patch 11    pravastatin (PRAVACHOL) 40 MG tablet TAKE ONE TABLET BY MOUTH ONCE DAILY 90 tablet 3    sertraline (ZOLOFT) 100 MG tablet Take 1.5 tablets (150 mg total) by mouth once daily. 135 tablet 3    VITAMIN D2 1,250 mcg (50,000 unit) capsule TAKE ONE CAPSULE BY MOUTH EVERY 7 DAYS 12 capsule 3    ZINC ACETATE ORAL Take 250 mg by mouth once daily.       [DISCONTINUED] sodium bicarbonate 650 MG tablet Take 1 tablet (650 mg total) by mouth 2 (two) times daily. 60 tablet 11     Facility-Administered Encounter Medications as of 6/23/2023   Medication Dose Route Frequency Provider Last Rate Last Admin    denosumab (PROLIA) injection 60 mg  60 mg Subcutaneous Q6 Months Dilshad Rogers MD   60 mg at  08/29/18 1032     Medication List with Changes/Refills   Current Medications    ALLOPURINOL (ZYLOPRIM) 300 MG TABLET    Take 1 tablet (300 mg total) by mouth once daily.    ASCORBIC ACID, VITAMIN C, (VITAMIN C) 100 MG TABLET    Take by mouth once daily.    ASPIRIN (ECOTRIN) 81 MG EC TABLET    Take 81 mg by mouth nightly.     AZELASTINE (ASTELIN) 137 MCG (0.1 %) NASAL SPRAY    1 spray (137 mcg total) by Nasal route 2 (two) times daily.    BUSPIRONE (BUSPAR) 7.5 MG TABLET    Take 1 tablet (7.5 mg total) by mouth 2 (two) times a day.    CALCITRIOL (ROCALTROL) 0.25 MCG CAP    TAKE ONE CAPSULE BY MOUTH EVERY MONDAY, WEDNESDAY AND FRIDAY    CLOPIDOGREL (PLAVIX) 75 MG TABLET    TAKE ONE TABLET BY MOUTH EVERY DAY    DICLOFENAC SODIUM 1 % GEL    Apply 2 g topically once daily. Apply 2 g over painful joints once or twice a day.    DIPRIVAN 10 MG/ML INFUSION        DULOXETINE (CYMBALTA) 30 MG CAPSULE    TAKE ONE CAPSULE BY MOUTH EVERY DAY    ELIQUIS 2.5 MG TAB    Take 1 tablet (2.5 mg total) by mouth 2 (two) times daily.    FLUTICASONE PROPIONATE (FLONASE) 50 MCG/ACTUATION NASAL SPRAY    2 sprays (100 mcg total) by Each Nostril route once daily.    FUROSEMIDE (LASIX) 40 MG TABLET    Take 1 tablet (40 mg total) by mouth 2 (two) times daily.    HYDROXYCHLOROQUINE (PLAQUENIL) 200 MG TABLET    Take 1 tablet (200 mg total) by mouth once daily.    IRON-VITAMIN C 100-250 MG, ICAR-C, 100-250 MG TAB    TAKE ONE TABLET BY MOUTH EVERY DAY    LEVOCETIRIZINE (XYZAL) 5 MG TABLET    Take 1 tablet (5 mg total) by mouth every evening.    LEVOTHYROXINE (SYNTHROID) 75 MCG TABLET    Take 1 tablet (75 mcg total) by mouth before breakfast.    METHYLPREDNISOLONE (MEDROL DOSEPACK) 4 MG TABLET    use as directed    METOPROLOL TARTRATE (LOPRESSOR) 25 MG TABLET    TAKE ONE TABLET BY MOUTH TWICE DAILY    MULTIVITAMIN (THERAGRAN) PER TABLET    Take 1 tablet by mouth once daily.    NITROGLYCERIN 0.2 MG/HR TD PT24 (NITRODUR) 0.2 MG/HR    Place 1 patch onto  the skin once daily.    PRAVASTATIN (PRAVACHOL) 40 MG TABLET    TAKE ONE TABLET BY MOUTH ONCE DAILY    SERTRALINE (ZOLOFT) 100 MG TABLET    Take 1.5 tablets (150 mg total) by mouth once daily.    VITAMIN D2 1,250 MCG (50,000 UNIT) CAPSULE    TAKE ONE CAPSULE BY MOUTH EVERY 7 DAYS    ZINC ACETATE ORAL    Take 250 mg by mouth once daily.    Changed and/or Refilled Medications    Modified Medication Previous Medication    SODIUM BICARBONATE 650 MG TABLET sodium bicarbonate 650 MG tablet       TAKE ONE TABLET BY MOUTH TWICE DAILY    Take 1 tablet (650 mg total) by mouth 2 (two) times daily.        This note is at least partially dictated using the M*Modal Fluency Direct word recognition program. There are word recognition mistakes that are occasionally missed on review.

## 2023-06-28 NOTE — TELEPHONE ENCOUNTER
Recalled patient and advised NPO after 7am, may have juice, coffee at 6am with morning medications and report for 1:00PM with 3:00PM start time.    Returned call to patient and advised arrival time still pending between 1230 or 130 tomorrow but will call back later to confirm

## 2023-06-28 NOTE — H&P (VIEW-ONLY)
Subjective:   Patient ID:  Violet Swenson is a 83 y.o. female     Chief complaint:CHF    HPI  New patient to me. (06/28/2023 )  Referred by Dr Gil for evaluation and management of CHF/AF/PPM   --   Background as gleaned from patient's records and today's interview :  84 yo female with a h/o anemia, anxiety, atrial flutter, lymphoma large cell B, CHF, CAD, depression, GERD, gout, heart failure, HLD, HTN, hypothyroidism kidney disease, lung nodule, obesity, polyneuropathy.  Also CHB with MDT DC implant in 2015  AF noted as of Aug 2021    A year ago had stroke with right arm weakness, slurred speech, and gait instability.    Has had chronic chest pain. Cardiolite was negative.      Admitted in Nov 2021 for AF/CHF/MR >> diuresed , DCCV ii/11/21 - back in AF by 12/1/21    Recent CHF Sx exacerbation with leg swelling etc  >> Lasix dose was doubled.      Seen on 6/22/2023 by Dr Gil who noted:   Has still some residual exertional shortness her weight has been stable her cardiolite is negative she is in afib since 10/22 she is being followed by ep soon. She is compliant with salt intake.     Other relevant data:  EF has been slowly declining over the years - now at 40% despite mod MR  She is 100 % RV paced  Underlying rhythm is AF with AMITA at 33 bpm and RBBB  She continues to be quite short of breath.         Past Medical History:     Current Outpatient Medications   Medication Sig    allopurinoL (ZYLOPRIM) 300 MG tablet Take 1 tablet (300 mg total) by mouth once daily.    ascorbic acid, vitamin C, (VITAMIN C) 100 MG tablet Take by mouth once daily.    aspirin (ECOTRIN) 81 MG EC tablet Take 81 mg by mouth nightly.     azelastine (ASTELIN) 137 mcg (0.1 %) nasal spray 1 spray (137 mcg total) by Nasal route 2 (two) times daily.    busPIRone (BUSPAR) 7.5 MG tablet Take 1 tablet (7.5 mg total) by mouth 2 (two) times a day.    calcitRIOL (ROCALTROL) 0.25 MCG Cap TAKE ONE CAPSULE BY MOUTH EVERY MONDAY, WEDNESDAY  AND FRIDAY    clopidogreL (PLAVIX) 75 mg tablet TAKE ONE TABLET BY MOUTH EVERY DAY    diclofenac sodium 1 % Gel Apply 2 g topically once daily. Apply 2 g over painful joints once or twice a day.    DIPRIVAN 10 mg/mL infusion     DULoxetine (CYMBALTA) 30 MG capsule TAKE ONE CAPSULE BY MOUTH EVERY DAY    ELIQUIS 2.5 mg Tab Take 1 tablet (2.5 mg total) by mouth 2 (two) times daily.    fluticasone propionate (FLONASE) 50 mcg/actuation nasal spray 2 sprays (100 mcg total) by Each Nostril route once daily.    furosemide (LASIX) 40 MG tablet Take 1 tablet (40 mg total) by mouth 2 (two) times daily. (Patient taking differently: Take 40 mg by mouth once daily. 1 every day, then 2 tablets every other day)    hydrOXYchloroQUINE (PLAQUENIL) 200 mg tablet Take 1 tablet (200 mg total) by mouth once daily.    iron-vitamin C 100-250 mg, ICAR-C, 100-250 mg Tab TAKE ONE TABLET BY MOUTH EVERY DAY    levocetirizine (XYZAL) 5 MG tablet Take 1 tablet (5 mg total) by mouth every evening.    levothyroxine (SYNTHROID) 75 MCG tablet Take 1 tablet (75 mcg total) by mouth before breakfast.    methylPREDNISolone (MEDROL DOSEPACK) 4 mg tablet use as directed    metoprolol tartrate (LOPRESSOR) 25 MG tablet TAKE ONE TABLET BY MOUTH TWICE DAILY    multivitamin (THERAGRAN) per tablet Take 1 tablet by mouth once daily.    nitroGLYCERIN 0.2 mg/hr TD PT24 (NITRODUR) 0.2 mg/hr Place 1 patch onto the skin once daily.    pravastatin (PRAVACHOL) 40 MG tablet TAKE ONE TABLET BY MOUTH ONCE DAILY    sertraline (ZOLOFT) 100 MG tablet Take 1.5 tablets (150 mg total) by mouth once daily.    VITAMIN D2 1,250 mcg (50,000 unit) capsule TAKE ONE CAPSULE BY MOUTH EVERY 7 DAYS    ZINC ACETATE ORAL Take 250 mg by mouth once daily.     sodium bicarbonate 650 MG tablet TAKE ONE TABLET BY MOUTH TWICE DAILY     Current Facility-Administered Medications   Medication    denosumab (PROLIA) injection 60 mg       Review of Systems     Constitutional: Reviewed  for decreased  appetite, weight gain and weight loss.   HENT: Reviewed for nosebleeds.    Eyes:  Reviewed for blurred vision and visual disturbance.   Cardiovascular: Reviewed for chest pain, claudication, cyanosis,dyspnea on exertion, leg swelling, orthopnea,paroxysmal nocturnal dyspnearregular heartbeats, palpitations, near-syncope, and syncope.   Respiratory: Reviewed for cough, shortness of breath, wheezing, sleep disturbances due to breathing and snoring, .    Endocrine: Reviewed for heat intolerance.   Hematologic/Lymphatic: Reviewed for easy bruisability/bleeding.   Skin: Reviewed for rash.   Musculoskeletal: Reviewed for muscle weakness and myalgias.   Gastrointestinal: Reviewed for abdominal pain, anorexia, melena, nausea and vomiting.   Genitourinary: Reviewed for menorrhagia, frequency, nocturia and incontinence.   Neurological: Reviewed for excessive daytime sleepiness, dizziness, vertigo, weakness, headaches, loss of balance and seizures,   Psychiatric/Behavioral:  Reviewed for insomnia, altered mental status, depression, anxiety and nervousness.       All symptoms reviewed above were negative except for   Chest pain, dyspnea on exertion, leg edema, palpitations and near fainting.  There are also complaints of pain with diarrhea, frequent urination, nocturia, easy bruisability.  She endorses dizziness, generalized weakness, headaches, loss of balance tingling and arthritic complaints as well as anxiety and depression.     Social History     Tobacco Use   Smoking Status Former    Packs/day: 2.00    Years: 6.00    Pack years: 12.00    Types: Cigarettes    Quit date: 3/15/1976    Years since quittin.3   Smokeless Tobacco Never       reports no history of alcohol use.   Past Medical History:   Diagnosis Date    Age-related osteoporosis without current pathological fracture 2018    Anemia     Anxiety     Arthritis     Atrial flutter     Cancer     lymphoma Large cell B    CHF (congestive heart failure)      "Chronic anemia 2017    Chronic midline low back pain with right-sided sciatica 2018    Coronary artery disease     2015 LHC patent LCX. 50% stenosis in LAD and RCA.      Depression     Disorder of kidney and ureter     Encounter for blood transfusion     GERD (gastroesophageal reflux disease)     Gout, arthritis     Heart failure     Hx of psychiatric care     Hyperlipidemia     Hypertension     Hypothyroidism     Immune deficiency disorder     Kidney disease     Lung nodule     RML--stable    Obesity     Pacemaker     Metronic    Paroxysmal atrial fibrillation 3/15/2018    Pneumonia     Polyneuropathy     chemo induced    Psychiatric problem     Rheumatoid arthritis involving multiple sites with positive rheumatoid factor 2023    Stroke 2020    Tobacco dependence     quit     Trouble in sleeping     Unstable angina 2020     Family History   Problem Relation Age of Onset    Heart disease Mother     Hypertension Mother     Cataracts Mother     Stomach cancer Father         "ulcers that turned to cancer"    Cancer Father         stomach    Pancreatic cancer Sister     Cancer Sister         pancreatic    Leukemia Brother         "leukemia which led to intestinal cancer"    Cataracts Brother     Cancer Brother         leukemia then later stomach cancer    Drug abuse Son     Cancer Son         prostate cancer    Prostate cancer Son     COPD Daughter     Asthma Daughter     Hypertension Maternal Grandfather     Stroke Maternal Grandfather     Alcohol abuse Neg Hx     Diabetes Neg Hx     Mental retardation Neg Hx     Mental illness Neg Hx      Social History     Socioeconomic History    Marital status:     Number of children: 4   Occupational History    Occupation: Retired   Tobacco Use    Smoking status: Former     Packs/day: 2.00     Years: 6.00     Pack years: 12.00     Types: Cigarettes     Quit date: 3/15/1976     Years since quittin.3    Smokeless tobacco: Never "   Substance and Sexual Activity    Alcohol use: No     Alcohol/week: 0.0 standard drinks    Drug use: No    Sexual activity: Not Currently     Partners: Male   Social History Narrative    , lives with . She is a retired . 4 children (3 natural born, 1 adopted)- 2 ; 2x  (currently 17 years); son has prostate cancer, daughter COPD,  cardiac difficulty. She still drives. Does not have a Living will or Advanced Directive.     Past Surgical History:   Procedure Laterality Date    APPENDECTOMY  1966 approx    CARDIAC PACEMAKER PLACEMENT  2015    CHOLECYSTECTOMY  1993    incidental at time of gastric bypass    COLON SURGERY Right 2017    hemicolectomy    COLONOSCOPY N/A 2017    Procedure: COLONOSCOPY;  Surgeon: Tye Enamorado MD;  Location: [facility];  Service: Endoscopy;  Laterality: N/A;    COLONOSCOPY N/A 2018    Procedure: COLONOSCOPY;  Surgeon: Saúl Arthur III, MD;  Location: [facility];  Service: Endoscopy;  Laterality: N/A;    CORONARY ANGIOPLASTY  2014    CORONARY STENT PLACEMENT  2014    ESOPHAGOGASTRODUODENOSCOPY N/A 2018    Procedure: EGD (ESOPHAGOGASTRODUODENOSCOPY);  Surgeon: Saúl Arthur III, MD;  Location: [facility];  Service: Endoscopy;  Laterality: N/A;    GASTRIC BYPASS      with incidental choly    HERNIA REPAIR      INJECTION OF ANESTHETIC AGENT INTO SACROILIAC JOINT Right 10/8/2020    Procedure: Right BLOCK, SACROILIAC JOINT with RN IV sedation;  Surgeon: Madi Anton MD;  Location: [facility];  Service: Pain Management;  Laterality: Right;    JOINT REPLACEMENT Bilateral     3 months apart    TONSILLECTOMY         Objective:   Physical Exam  Vitals and nursing note reviewed.   Constitutional:       Appearance: She is well-developed.   HENT:      Head: Normocephalic and atraumatic.      Right Ear: External ear normal.      Left Ear: External ear normal.   Eyes:      General: No scleral icterus.         "Left eye: No discharge.      Conjunctiva/sclera: Conjunctivae normal.      Pupils: Pupils are equal, round, and reactive to light.   Neck:      Thyroid: No thyromegaly.   Cardiovascular:      Rate and Rhythm: Normal rate and regular rhythm.      Pulses: Intact distal pulses.           Carotid pulses are 2+ on the right side and 2+ on the left side.       Radial pulses are 2+ on the right side and 2+ on the left side.        Dorsalis pedis pulses are 2+ on the right side and 2+ on the left side.        Posterior tibial pulses are 2+ on the right side and 2+ on the left side.      Heart sounds: Normal heart sounds. No midsystolic click and no opening snap. No murmur heard.    No friction rub. No gallop. No S3 or S4 sounds.   Pulmonary:      Effort: Pulmonary effort is normal.      Breath sounds: Normal breath sounds.   Chest:      Comments: Device pocket is in good repair.  Abdominal:      General: There is no distension.      Palpations: Abdomen is soft. There is no hepatomegaly.      Tenderness: There is no abdominal tenderness. There is no guarding.   Musculoskeletal:      Cervical back: Normal range of motion and neck supple.      Right lower leg: No swelling.      Left lower leg: No swelling.      Right ankle: No swelling.      Left ankle: No swelling.   Skin:     General: Skin is warm and dry.      Findings: No rash.      Nails: There is no clubbing.   Neurological:      Mental Status: She is alert and oriented to person, place, and time.      Cranial Nerves: No cranial nerve deficit.      Gait: Gait normal.   Psychiatric:         Speech: Speech normal.         Behavior: Behavior normal.         Thought Content: Thought content normal.     /80 (BP Location: Left arm, Patient Position: Sitting, BP Method: Medium (Manual))   Pulse 63   Ht 5' 4" (1.626 m)   Wt 60.1 kg (132 lb 7.9 oz)   SpO2 100%   BMI 22.74 kg/m²          Results for orders placed during the hospital encounter of " 04/28/23    Echo    Interpretation Summary  · The left ventricle is normal in size with eccentric hypertrophy and mildly decreased systolic function.  · Severe left atrial enlargement.  · Grade III left ventricular diastolic dysfunction.  · The estimated PA systolic pressure is 32 mmHg.  · Normal right ventricular size with normal right ventricular systolic function.  · Intermediate central venous pressure (8 mmHg).  · The estimated ejection fraction is 40%.  · There is abnormal septal wall motion consistent with right ventricular pacemaker.  · Moderate mitral regurgitation.  · Mild tricuspid regurgitation.    WBC   Date Value Ref Range Status   06/23/2023 6.67 3.90 - 12.70 K/uL Final     POC Hematocrit   Date Value Ref Range Status   04/07/2017 35 (L) 36 - 54 %PCV Final     Hematocrit   Date Value Ref Range Status   06/23/2023 37.6 37.0 - 48.5 % Final     Hemoglobin   Date Value Ref Range Status   06/23/2023 12.2 12.0 - 16.0 g/dL Final     Lab Results   Component Value Date     06/23/2023     Lab Results   Component Value Date    CREATININE 1.6 (H) 06/23/2023    EGFRNORACEVR 32.0 (A) 06/23/2023    K 4.6 06/23/2023     Lab Results   Component Value Date    BNP 1,438 (H) 06/26/2023         Assessment:      Significant chronic congestive heart failure.  The this is at least worsened by RV pacing if not caused by it.  She does have narrow QRS   Junctional escape bradycardia with slight right bundle branch block.  1. Chronic systolic congestive heart failure    2. Cardiac pacemaker in situ    3. Paroxysmal atrial fibrillation    4. Postural hypotension    5. Atherosclerosis of aorta    6. Ischemic cardiomyopathy    7. Coronary artery disease : multiple vessels, s/p PTCA    8. Essential hypertension    9. Mixed hyperlipidemia    10. Chronic diastolic heart failure    11. Stage 3b chronic kidney disease    12. Chronic anemia    13. Diffuse large B-cell lymphoma, unspecified body region    14. Macrocytic anemia     15. Hypothyroidism (acquired)    16. S/P gastric bypass    17. Hyperparathyroidism        Plan:     Will need to upgrade her pacemaker.  Ideally his pacing lead would be the best approach.  Alternatively, an upgrade to CRT would be an acceptable solution.  Initially will start with the venograms we can map are next step.  I have discussed the   Venogram and then upgrade procedures in detail with the patient. I described its benefits and risks. I reviewed alternative therapies and discussed their potential value. The patient was given ample opportunity to express concerns and ask questions and I provided appropriate responses and  answers to such.The patient understands and agrees to proceed.  Consent form was signed  by patient and myself and appropriately witnessed.     No orders of the defined types were placed in this encounter.    Follow up if symptoms worsen or fail to improve, for post work up, post op.  There are no discontinued medications.  Outpatient Encounter Medications as of 6/23/2023   Medication Sig Dispense Refill    allopurinoL (ZYLOPRIM) 300 MG tablet Take 1 tablet (300 mg total) by mouth once daily. 90 tablet 11    ascorbic acid, vitamin C, (VITAMIN C) 100 MG tablet Take by mouth once daily.      aspirin (ECOTRIN) 81 MG EC tablet Take 81 mg by mouth nightly.       azelastine (ASTELIN) 137 mcg (0.1 %) nasal spray 1 spray (137 mcg total) by Nasal route 2 (two) times daily. 30 mL 0    busPIRone (BUSPAR) 7.5 MG tablet Take 1 tablet (7.5 mg total) by mouth 2 (two) times a day. 180 tablet 3    calcitRIOL (ROCALTROL) 0.25 MCG Cap TAKE ONE CAPSULE BY MOUTH EVERY MONDAY, WEDNESDAY AND FRIDAY 12 capsule 0    clopidogreL (PLAVIX) 75 mg tablet TAKE ONE TABLET BY MOUTH EVERY DAY 30 tablet 6    diclofenac sodium 1 % Gel Apply 2 g topically once daily. Apply 2 g over painful joints once or twice a day. 100 g 6    DIPRIVAN 10 mg/mL infusion       DULoxetine (CYMBALTA) 30 MG capsule TAKE ONE CAPSULE BY MOUTH  EVERY DAY 30 capsule 11    ELIQUIS 2.5 mg Tab Take 1 tablet (2.5 mg total) by mouth 2 (two) times daily. 180 tablet 3    fluticasone propionate (FLONASE) 50 mcg/actuation nasal spray 2 sprays (100 mcg total) by Each Nostril route once daily. 16 g 11    furosemide (LASIX) 40 MG tablet Take 1 tablet (40 mg total) by mouth 2 (two) times daily. (Patient taking differently: Take 40 mg by mouth once daily. 1 every day, then 2 tablets every other day) 60 tablet 11    hydrOXYchloroQUINE (PLAQUENIL) 200 mg tablet Take 1 tablet (200 mg total) by mouth once daily. 90 tablet 3    iron-vitamin C 100-250 mg, ICAR-C, 100-250 mg Tab TAKE ONE TABLET BY MOUTH EVERY DAY 30 tablet 4    levocetirizine (XYZAL) 5 MG tablet Take 1 tablet (5 mg total) by mouth every evening. 30 tablet 11    levothyroxine (SYNTHROID) 75 MCG tablet Take 1 tablet (75 mcg total) by mouth before breakfast. 90 tablet 3    methylPREDNISolone (MEDROL DOSEPACK) 4 mg tablet use as directed 21 tablet 0    metoprolol tartrate (LOPRESSOR) 25 MG tablet TAKE ONE TABLET BY MOUTH TWICE DAILY 60 tablet 6    multivitamin (THERAGRAN) per tablet Take 1 tablet by mouth once daily.      nitroGLYCERIN 0.2 mg/hr TD PT24 (NITRODUR) 0.2 mg/hr Place 1 patch onto the skin once daily. 30 patch 11    pravastatin (PRAVACHOL) 40 MG tablet TAKE ONE TABLET BY MOUTH ONCE DAILY 90 tablet 3    sertraline (ZOLOFT) 100 MG tablet Take 1.5 tablets (150 mg total) by mouth once daily. 135 tablet 3    VITAMIN D2 1,250 mcg (50,000 unit) capsule TAKE ONE CAPSULE BY MOUTH EVERY 7 DAYS 12 capsule 3    ZINC ACETATE ORAL Take 250 mg by mouth once daily.       [DISCONTINUED] sodium bicarbonate 650 MG tablet Take 1 tablet (650 mg total) by mouth 2 (two) times daily. 60 tablet 11     Facility-Administered Encounter Medications as of 6/23/2023   Medication Dose Route Frequency Provider Last Rate Last Admin    denosumab (PROLIA) injection 60 mg  60 mg Subcutaneous Q6 Months Dilshad Rogers MD   60 mg at  08/29/18 1032     Medication List with Changes/Refills   Current Medications    ALLOPURINOL (ZYLOPRIM) 300 MG TABLET    Take 1 tablet (300 mg total) by mouth once daily.    ASCORBIC ACID, VITAMIN C, (VITAMIN C) 100 MG TABLET    Take by mouth once daily.    ASPIRIN (ECOTRIN) 81 MG EC TABLET    Take 81 mg by mouth nightly.     AZELASTINE (ASTELIN) 137 MCG (0.1 %) NASAL SPRAY    1 spray (137 mcg total) by Nasal route 2 (two) times daily.    BUSPIRONE (BUSPAR) 7.5 MG TABLET    Take 1 tablet (7.5 mg total) by mouth 2 (two) times a day.    CALCITRIOL (ROCALTROL) 0.25 MCG CAP    TAKE ONE CAPSULE BY MOUTH EVERY MONDAY, WEDNESDAY AND FRIDAY    CLOPIDOGREL (PLAVIX) 75 MG TABLET    TAKE ONE TABLET BY MOUTH EVERY DAY    DICLOFENAC SODIUM 1 % GEL    Apply 2 g topically once daily. Apply 2 g over painful joints once or twice a day.    DIPRIVAN 10 MG/ML INFUSION        DULOXETINE (CYMBALTA) 30 MG CAPSULE    TAKE ONE CAPSULE BY MOUTH EVERY DAY    ELIQUIS 2.5 MG TAB    Take 1 tablet (2.5 mg total) by mouth 2 (two) times daily.    FLUTICASONE PROPIONATE (FLONASE) 50 MCG/ACTUATION NASAL SPRAY    2 sprays (100 mcg total) by Each Nostril route once daily.    FUROSEMIDE (LASIX) 40 MG TABLET    Take 1 tablet (40 mg total) by mouth 2 (two) times daily.    HYDROXYCHLOROQUINE (PLAQUENIL) 200 MG TABLET    Take 1 tablet (200 mg total) by mouth once daily.    IRON-VITAMIN C 100-250 MG, ICAR-C, 100-250 MG TAB    TAKE ONE TABLET BY MOUTH EVERY DAY    LEVOCETIRIZINE (XYZAL) 5 MG TABLET    Take 1 tablet (5 mg total) by mouth every evening.    LEVOTHYROXINE (SYNTHROID) 75 MCG TABLET    Take 1 tablet (75 mcg total) by mouth before breakfast.    METHYLPREDNISOLONE (MEDROL DOSEPACK) 4 MG TABLET    use as directed    METOPROLOL TARTRATE (LOPRESSOR) 25 MG TABLET    TAKE ONE TABLET BY MOUTH TWICE DAILY    MULTIVITAMIN (THERAGRAN) PER TABLET    Take 1 tablet by mouth once daily.    NITROGLYCERIN 0.2 MG/HR TD PT24 (NITRODUR) 0.2 MG/HR    Place 1 patch onto  the skin once daily.    PRAVASTATIN (PRAVACHOL) 40 MG TABLET    TAKE ONE TABLET BY MOUTH ONCE DAILY    SERTRALINE (ZOLOFT) 100 MG TABLET    Take 1.5 tablets (150 mg total) by mouth once daily.    VITAMIN D2 1,250 MCG (50,000 UNIT) CAPSULE    TAKE ONE CAPSULE BY MOUTH EVERY 7 DAYS    ZINC ACETATE ORAL    Take 250 mg by mouth once daily.    Changed and/or Refilled Medications    Modified Medication Previous Medication    SODIUM BICARBONATE 650 MG TABLET sodium bicarbonate 650 MG tablet       TAKE ONE TABLET BY MOUTH TWICE DAILY    Take 1 tablet (650 mg total) by mouth 2 (two) times daily.        This note is at least partially dictated using the M*Modal Fluency Direct word recognition program. There are word recognition mistakes that are occasionally missed on review.

## 2023-06-28 NOTE — PROGRESS NOTES
Subjective:   Patient ID:  Violet Swenson is a 83 y.o. female     Chief complaint:CHF    HPI  New patient to me. (06/28/2023 )  Referred by Dr Gil for evaluation and management of CHF/AF/PPM   --   Background as gleaned from patient's records and today's interview :  84 yo female with a h/o anemia, anxiety, atrial flutter, lymphoma large cell B, CHF, CAD, depression, GERD, gout, heart failure, HLD, HTN, hypothyroidism kidney disease, lung nodule, obesity, polyneuropathy.  Also CHB with MDT DC implant in 2015  AF noted as of Aug 2021    A year ago had stroke with right arm weakness, slurred speech, and gait instability.    Has had chronic chest pain. Cardiolite was negative.      Admitted in Nov 2021 for AF/CHF/MR >> diuresed , DCCV ii/11/21 - back in AF by 12/1/21    Recent CHF Sx exacerbation with leg swelling etc  >> Lasix dose was doubled.      Seen on 6/22/2023 by Dr Gil who noted:   Has still some residual exertional shortness her weight has been stable her cardiolite is negative she is in afib since 10/22 she is being followed by ep soon. She is compliant with salt intake.     Other relevant data:  EF has been slowly declining over the years - now at 40% despite mod MR  She is 100 % RV paced  Underlying rhythm is AF with AMITA at 33 bpm and RBBB  She continues to be quite short of breath.         Past Medical History:     Current Outpatient Medications   Medication Sig    allopurinoL (ZYLOPRIM) 300 MG tablet Take 1 tablet (300 mg total) by mouth once daily.    ascorbic acid, vitamin C, (VITAMIN C) 100 MG tablet Take by mouth once daily.    aspirin (ECOTRIN) 81 MG EC tablet Take 81 mg by mouth nightly.     azelastine (ASTELIN) 137 mcg (0.1 %) nasal spray 1 spray (137 mcg total) by Nasal route 2 (two) times daily.    busPIRone (BUSPAR) 7.5 MG tablet Take 1 tablet (7.5 mg total) by mouth 2 (two) times a day.    calcitRIOL (ROCALTROL) 0.25 MCG Cap TAKE ONE CAPSULE BY MOUTH EVERY MONDAY, WEDNESDAY  AND FRIDAY    clopidogreL (PLAVIX) 75 mg tablet TAKE ONE TABLET BY MOUTH EVERY DAY    diclofenac sodium 1 % Gel Apply 2 g topically once daily. Apply 2 g over painful joints once or twice a day.    DIPRIVAN 10 mg/mL infusion     DULoxetine (CYMBALTA) 30 MG capsule TAKE ONE CAPSULE BY MOUTH EVERY DAY    ELIQUIS 2.5 mg Tab Take 1 tablet (2.5 mg total) by mouth 2 (two) times daily.    fluticasone propionate (FLONASE) 50 mcg/actuation nasal spray 2 sprays (100 mcg total) by Each Nostril route once daily.    furosemide (LASIX) 40 MG tablet Take 1 tablet (40 mg total) by mouth 2 (two) times daily. (Patient taking differently: Take 40 mg by mouth once daily. 1 every day, then 2 tablets every other day)    hydrOXYchloroQUINE (PLAQUENIL) 200 mg tablet Take 1 tablet (200 mg total) by mouth once daily.    iron-vitamin C 100-250 mg, ICAR-C, 100-250 mg Tab TAKE ONE TABLET BY MOUTH EVERY DAY    levocetirizine (XYZAL) 5 MG tablet Take 1 tablet (5 mg total) by mouth every evening.    levothyroxine (SYNTHROID) 75 MCG tablet Take 1 tablet (75 mcg total) by mouth before breakfast.    methylPREDNISolone (MEDROL DOSEPACK) 4 mg tablet use as directed    metoprolol tartrate (LOPRESSOR) 25 MG tablet TAKE ONE TABLET BY MOUTH TWICE DAILY    multivitamin (THERAGRAN) per tablet Take 1 tablet by mouth once daily.    nitroGLYCERIN 0.2 mg/hr TD PT24 (NITRODUR) 0.2 mg/hr Place 1 patch onto the skin once daily.    pravastatin (PRAVACHOL) 40 MG tablet TAKE ONE TABLET BY MOUTH ONCE DAILY    sertraline (ZOLOFT) 100 MG tablet Take 1.5 tablets (150 mg total) by mouth once daily.    VITAMIN D2 1,250 mcg (50,000 unit) capsule TAKE ONE CAPSULE BY MOUTH EVERY 7 DAYS    ZINC ACETATE ORAL Take 250 mg by mouth once daily.     sodium bicarbonate 650 MG tablet TAKE ONE TABLET BY MOUTH TWICE DAILY     Current Facility-Administered Medications   Medication    denosumab (PROLIA) injection 60 mg       Review of Systems     Constitutional: Reviewed  for decreased  appetite, weight gain and weight loss.   HENT: Reviewed for nosebleeds.    Eyes:  Reviewed for blurred vision and visual disturbance.   Cardiovascular: Reviewed for chest pain, claudication, cyanosis,dyspnea on exertion, leg swelling, orthopnea,paroxysmal nocturnal dyspnearregular heartbeats, palpitations, near-syncope, and syncope.   Respiratory: Reviewed for cough, shortness of breath, wheezing, sleep disturbances due to breathing and snoring, .    Endocrine: Reviewed for heat intolerance.   Hematologic/Lymphatic: Reviewed for easy bruisability/bleeding.   Skin: Reviewed for rash.   Musculoskeletal: Reviewed for muscle weakness and myalgias.   Gastrointestinal: Reviewed for abdominal pain, anorexia, melena, nausea and vomiting.   Genitourinary: Reviewed for menorrhagia, frequency, nocturia and incontinence.   Neurological: Reviewed for excessive daytime sleepiness, dizziness, vertigo, weakness, headaches, loss of balance and seizures,   Psychiatric/Behavioral:  Reviewed for insomnia, altered mental status, depression, anxiety and nervousness.       All symptoms reviewed above were negative except for   Chest pain, dyspnea on exertion, leg edema, palpitations and near fainting.  There are also complaints of pain with diarrhea, frequent urination, nocturia, easy bruisability.  She endorses dizziness, generalized weakness, headaches, loss of balance tingling and arthritic complaints as well as anxiety and depression.     Social History     Tobacco Use   Smoking Status Former    Packs/day: 2.00    Years: 6.00    Pack years: 12.00    Types: Cigarettes    Quit date: 3/15/1976    Years since quittin.3   Smokeless Tobacco Never       reports no history of alcohol use.   Past Medical History:   Diagnosis Date    Age-related osteoporosis without current pathological fracture 2018    Anemia     Anxiety     Arthritis     Atrial flutter     Cancer     lymphoma Large cell B    CHF (congestive heart failure)      "Chronic anemia 2017    Chronic midline low back pain with right-sided sciatica 2018    Coronary artery disease     2015 LHC patent LCX. 50% stenosis in LAD and RCA.      Depression     Disorder of kidney and ureter     Encounter for blood transfusion     GERD (gastroesophageal reflux disease)     Gout, arthritis     Heart failure     Hx of psychiatric care     Hyperlipidemia     Hypertension     Hypothyroidism     Immune deficiency disorder     Kidney disease     Lung nodule     RML--stable    Obesity     Pacemaker     Metronic    Paroxysmal atrial fibrillation 3/15/2018    Pneumonia     Polyneuropathy     chemo induced    Psychiatric problem     Rheumatoid arthritis involving multiple sites with positive rheumatoid factor 2023    Stroke 2020    Tobacco dependence     quit     Trouble in sleeping     Unstable angina 2020     Family History   Problem Relation Age of Onset    Heart disease Mother     Hypertension Mother     Cataracts Mother     Stomach cancer Father         "ulcers that turned to cancer"    Cancer Father         stomach    Pancreatic cancer Sister     Cancer Sister         pancreatic    Leukemia Brother         "leukemia which led to intestinal cancer"    Cataracts Brother     Cancer Brother         leukemia then later stomach cancer    Drug abuse Son     Cancer Son         prostate cancer    Prostate cancer Son     COPD Daughter     Asthma Daughter     Hypertension Maternal Grandfather     Stroke Maternal Grandfather     Alcohol abuse Neg Hx     Diabetes Neg Hx     Mental retardation Neg Hx     Mental illness Neg Hx      Social History     Socioeconomic History    Marital status:     Number of children: 4   Occupational History    Occupation: Retired   Tobacco Use    Smoking status: Former     Packs/day: 2.00     Years: 6.00     Pack years: 12.00     Types: Cigarettes     Quit date: 3/15/1976     Years since quittin.3    Smokeless tobacco: Never "   Substance and Sexual Activity    Alcohol use: No     Alcohol/week: 0.0 standard drinks    Drug use: No    Sexual activity: Not Currently     Partners: Male   Social History Narrative    , lives with . She is a retired . 4 children (3 natural born, 1 adopted)- 2 ; 2x  (currently 17 years); son has prostate cancer, daughter COPD,  cardiac difficulty. She still drives. Does not have a Living will or Advanced Directive.     Past Surgical History:   Procedure Laterality Date    APPENDECTOMY  1966 approx    CARDIAC PACEMAKER PLACEMENT  2015    CHOLECYSTECTOMY  1993    incidental at time of gastric bypass    COLON SURGERY Right 2017    hemicolectomy    COLONOSCOPY N/A 2017    Procedure: COLONOSCOPY;  Surgeon: Tye Enamorado MD;  Location: [facility];  Service: Endoscopy;  Laterality: N/A;    COLONOSCOPY N/A 2018    Procedure: COLONOSCOPY;  Surgeon: Saúl Arthur III, MD;  Location: [facility];  Service: Endoscopy;  Laterality: N/A;    CORONARY ANGIOPLASTY  2014    CORONARY STENT PLACEMENT  2014    ESOPHAGOGASTRODUODENOSCOPY N/A 2018    Procedure: EGD (ESOPHAGOGASTRODUODENOSCOPY);  Surgeon: Saúl Arthur III, MD;  Location: [facility];  Service: Endoscopy;  Laterality: N/A;    GASTRIC BYPASS      with incidental choly    HERNIA REPAIR      INJECTION OF ANESTHETIC AGENT INTO SACROILIAC JOINT Right 10/8/2020    Procedure: Right BLOCK, SACROILIAC JOINT with RN IV sedation;  Surgeon: Madi Anton MD;  Location: [facility];  Service: Pain Management;  Laterality: Right;    JOINT REPLACEMENT Bilateral     3 months apart    TONSILLECTOMY         Objective:   Physical Exam  Vitals and nursing note reviewed.   Constitutional:       Appearance: She is well-developed.   HENT:      Head: Normocephalic and atraumatic.      Right Ear: External ear normal.      Left Ear: External ear normal.   Eyes:      General: No scleral icterus.         "Left eye: No discharge.      Conjunctiva/sclera: Conjunctivae normal.      Pupils: Pupils are equal, round, and reactive to light.   Neck:      Thyroid: No thyromegaly.   Cardiovascular:      Rate and Rhythm: Normal rate and regular rhythm.      Pulses: Intact distal pulses.           Carotid pulses are 2+ on the right side and 2+ on the left side.       Radial pulses are 2+ on the right side and 2+ on the left side.        Dorsalis pedis pulses are 2+ on the right side and 2+ on the left side.        Posterior tibial pulses are 2+ on the right side and 2+ on the left side.      Heart sounds: Normal heart sounds. No midsystolic click and no opening snap. No murmur heard.    No friction rub. No gallop. No S3 or S4 sounds.   Pulmonary:      Effort: Pulmonary effort is normal.      Breath sounds: Normal breath sounds.   Chest:      Comments: Device pocket is in good repair.  Abdominal:      General: There is no distension.      Palpations: Abdomen is soft. There is no hepatomegaly.      Tenderness: There is no abdominal tenderness. There is no guarding.   Musculoskeletal:      Cervical back: Normal range of motion and neck supple.      Right lower leg: No swelling.      Left lower leg: No swelling.      Right ankle: No swelling.      Left ankle: No swelling.   Skin:     General: Skin is warm and dry.      Findings: No rash.      Nails: There is no clubbing.   Neurological:      Mental Status: She is alert and oriented to person, place, and time.      Cranial Nerves: No cranial nerve deficit.      Gait: Gait normal.   Psychiatric:         Speech: Speech normal.         Behavior: Behavior normal.         Thought Content: Thought content normal.     /80 (BP Location: Left arm, Patient Position: Sitting, BP Method: Medium (Manual))   Pulse 63   Ht 5' 4" (1.626 m)   Wt 60.1 kg (132 lb 7.9 oz)   SpO2 100%   BMI 22.74 kg/m²          Results for orders placed during the hospital encounter of " 04/28/23    Echo    Interpretation Summary  · The left ventricle is normal in size with eccentric hypertrophy and mildly decreased systolic function.  · Severe left atrial enlargement.  · Grade III left ventricular diastolic dysfunction.  · The estimated PA systolic pressure is 32 mmHg.  · Normal right ventricular size with normal right ventricular systolic function.  · Intermediate central venous pressure (8 mmHg).  · The estimated ejection fraction is 40%.  · There is abnormal septal wall motion consistent with right ventricular pacemaker.  · Moderate mitral regurgitation.  · Mild tricuspid regurgitation.    WBC   Date Value Ref Range Status   06/23/2023 6.67 3.90 - 12.70 K/uL Final     POC Hematocrit   Date Value Ref Range Status   04/07/2017 35 (L) 36 - 54 %PCV Final     Hematocrit   Date Value Ref Range Status   06/23/2023 37.6 37.0 - 48.5 % Final     Hemoglobin   Date Value Ref Range Status   06/23/2023 12.2 12.0 - 16.0 g/dL Final     Lab Results   Component Value Date     06/23/2023     Lab Results   Component Value Date    CREATININE 1.6 (H) 06/23/2023    EGFRNORACEVR 32.0 (A) 06/23/2023    K 4.6 06/23/2023     Lab Results   Component Value Date    BNP 1,438 (H) 06/26/2023         Assessment:      Significant chronic congestive heart failure.  The this is at least worsened by RV pacing if not caused by it.  She does have narrow QRS   Junctional escape bradycardia with slight right bundle branch block.  1. Chronic systolic congestive heart failure    2. Cardiac pacemaker in situ    3. Paroxysmal atrial fibrillation    4. Postural hypotension    5. Atherosclerosis of aorta    6. Ischemic cardiomyopathy    7. Coronary artery disease : multiple vessels, s/p PTCA    8. Essential hypertension    9. Mixed hyperlipidemia    10. Chronic diastolic heart failure    11. Stage 3b chronic kidney disease    12. Chronic anemia    13. Diffuse large B-cell lymphoma, unspecified body region    14. Macrocytic anemia     15. Hypothyroidism (acquired)    16. S/P gastric bypass    17. Hyperparathyroidism        Plan:     Will need to upgrade her pacemaker.  Ideally his pacing lead would be the best approach.  Alternatively, an upgrade to CRT would be an acceptable solution.  Initially will start with the venograms we can map are next step.  I have discussed the   Venogram and then upgrade procedures in detail with the patient. I described its benefits and risks. I reviewed alternative therapies and discussed their potential value. The patient was given ample opportunity to express concerns and ask questions and I provided appropriate responses and  answers to such.The patient understands and agrees to proceed.  Consent form was signed  by patient and myself and appropriately witnessed.     No orders of the defined types were placed in this encounter.    Follow up if symptoms worsen or fail to improve, for post work up, post op.  There are no discontinued medications.  Outpatient Encounter Medications as of 6/23/2023   Medication Sig Dispense Refill    allopurinoL (ZYLOPRIM) 300 MG tablet Take 1 tablet (300 mg total) by mouth once daily. 90 tablet 11    ascorbic acid, vitamin C, (VITAMIN C) 100 MG tablet Take by mouth once daily.      aspirin (ECOTRIN) 81 MG EC tablet Take 81 mg by mouth nightly.       azelastine (ASTELIN) 137 mcg (0.1 %) nasal spray 1 spray (137 mcg total) by Nasal route 2 (two) times daily. 30 mL 0    busPIRone (BUSPAR) 7.5 MG tablet Take 1 tablet (7.5 mg total) by mouth 2 (two) times a day. 180 tablet 3    calcitRIOL (ROCALTROL) 0.25 MCG Cap TAKE ONE CAPSULE BY MOUTH EVERY MONDAY, WEDNESDAY AND FRIDAY 12 capsule 0    clopidogreL (PLAVIX) 75 mg tablet TAKE ONE TABLET BY MOUTH EVERY DAY 30 tablet 6    diclofenac sodium 1 % Gel Apply 2 g topically once daily. Apply 2 g over painful joints once or twice a day. 100 g 6    DIPRIVAN 10 mg/mL infusion       DULoxetine (CYMBALTA) 30 MG capsule TAKE ONE CAPSULE BY MOUTH  EVERY DAY 30 capsule 11    ELIQUIS 2.5 mg Tab Take 1 tablet (2.5 mg total) by mouth 2 (two) times daily. 180 tablet 3    fluticasone propionate (FLONASE) 50 mcg/actuation nasal spray 2 sprays (100 mcg total) by Each Nostril route once daily. 16 g 11    furosemide (LASIX) 40 MG tablet Take 1 tablet (40 mg total) by mouth 2 (two) times daily. (Patient taking differently: Take 40 mg by mouth once daily. 1 every day, then 2 tablets every other day) 60 tablet 11    hydrOXYchloroQUINE (PLAQUENIL) 200 mg tablet Take 1 tablet (200 mg total) by mouth once daily. 90 tablet 3    iron-vitamin C 100-250 mg, ICAR-C, 100-250 mg Tab TAKE ONE TABLET BY MOUTH EVERY DAY 30 tablet 4    levocetirizine (XYZAL) 5 MG tablet Take 1 tablet (5 mg total) by mouth every evening. 30 tablet 11    levothyroxine (SYNTHROID) 75 MCG tablet Take 1 tablet (75 mcg total) by mouth before breakfast. 90 tablet 3    methylPREDNISolone (MEDROL DOSEPACK) 4 mg tablet use as directed 21 tablet 0    metoprolol tartrate (LOPRESSOR) 25 MG tablet TAKE ONE TABLET BY MOUTH TWICE DAILY 60 tablet 6    multivitamin (THERAGRAN) per tablet Take 1 tablet by mouth once daily.      nitroGLYCERIN 0.2 mg/hr TD PT24 (NITRODUR) 0.2 mg/hr Place 1 patch onto the skin once daily. 30 patch 11    pravastatin (PRAVACHOL) 40 MG tablet TAKE ONE TABLET BY MOUTH ONCE DAILY 90 tablet 3    sertraline (ZOLOFT) 100 MG tablet Take 1.5 tablets (150 mg total) by mouth once daily. 135 tablet 3    VITAMIN D2 1,250 mcg (50,000 unit) capsule TAKE ONE CAPSULE BY MOUTH EVERY 7 DAYS 12 capsule 3    ZINC ACETATE ORAL Take 250 mg by mouth once daily.       [DISCONTINUED] sodium bicarbonate 650 MG tablet Take 1 tablet (650 mg total) by mouth 2 (two) times daily. 60 tablet 11     Facility-Administered Encounter Medications as of 6/23/2023   Medication Dose Route Frequency Provider Last Rate Last Admin    denosumab (PROLIA) injection 60 mg  60 mg Subcutaneous Q6 Months Dilshad Rogers MD   60 mg at  08/29/18 1032     Medication List with Changes/Refills   Current Medications    ALLOPURINOL (ZYLOPRIM) 300 MG TABLET    Take 1 tablet (300 mg total) by mouth once daily.    ASCORBIC ACID, VITAMIN C, (VITAMIN C) 100 MG TABLET    Take by mouth once daily.    ASPIRIN (ECOTRIN) 81 MG EC TABLET    Take 81 mg by mouth nightly.     AZELASTINE (ASTELIN) 137 MCG (0.1 %) NASAL SPRAY    1 spray (137 mcg total) by Nasal route 2 (two) times daily.    BUSPIRONE (BUSPAR) 7.5 MG TABLET    Take 1 tablet (7.5 mg total) by mouth 2 (two) times a day.    CALCITRIOL (ROCALTROL) 0.25 MCG CAP    TAKE ONE CAPSULE BY MOUTH EVERY MONDAY, WEDNESDAY AND FRIDAY    CLOPIDOGREL (PLAVIX) 75 MG TABLET    TAKE ONE TABLET BY MOUTH EVERY DAY    DICLOFENAC SODIUM 1 % GEL    Apply 2 g topically once daily. Apply 2 g over painful joints once or twice a day.    DIPRIVAN 10 MG/ML INFUSION        DULOXETINE (CYMBALTA) 30 MG CAPSULE    TAKE ONE CAPSULE BY MOUTH EVERY DAY    ELIQUIS 2.5 MG TAB    Take 1 tablet (2.5 mg total) by mouth 2 (two) times daily.    FLUTICASONE PROPIONATE (FLONASE) 50 MCG/ACTUATION NASAL SPRAY    2 sprays (100 mcg total) by Each Nostril route once daily.    FUROSEMIDE (LASIX) 40 MG TABLET    Take 1 tablet (40 mg total) by mouth 2 (two) times daily.    HYDROXYCHLOROQUINE (PLAQUENIL) 200 MG TABLET    Take 1 tablet (200 mg total) by mouth once daily.    IRON-VITAMIN C 100-250 MG, ICAR-C, 100-250 MG TAB    TAKE ONE TABLET BY MOUTH EVERY DAY    LEVOCETIRIZINE (XYZAL) 5 MG TABLET    Take 1 tablet (5 mg total) by mouth every evening.    LEVOTHYROXINE (SYNTHROID) 75 MCG TABLET    Take 1 tablet (75 mcg total) by mouth before breakfast.    METHYLPREDNISOLONE (MEDROL DOSEPACK) 4 MG TABLET    use as directed    METOPROLOL TARTRATE (LOPRESSOR) 25 MG TABLET    TAKE ONE TABLET BY MOUTH TWICE DAILY    MULTIVITAMIN (THERAGRAN) PER TABLET    Take 1 tablet by mouth once daily.    NITROGLYCERIN 0.2 MG/HR TD PT24 (NITRODUR) 0.2 MG/HR    Place 1 patch onto  the skin once daily.    PRAVASTATIN (PRAVACHOL) 40 MG TABLET    TAKE ONE TABLET BY MOUTH ONCE DAILY    SERTRALINE (ZOLOFT) 100 MG TABLET    Take 1.5 tablets (150 mg total) by mouth once daily.    VITAMIN D2 1,250 MCG (50,000 UNIT) CAPSULE    TAKE ONE CAPSULE BY MOUTH EVERY 7 DAYS    ZINC ACETATE ORAL    Take 250 mg by mouth once daily.    Changed and/or Refilled Medications    Modified Medication Previous Medication    SODIUM BICARBONATE 650 MG TABLET sodium bicarbonate 650 MG tablet       TAKE ONE TABLET BY MOUTH TWICE DAILY    Take 1 tablet (650 mg total) by mouth 2 (two) times daily.        This note is at least partially dictated using the M*Modal Fluency Direct word recognition program. There are word recognition mistakes that are occasionally missed on review.

## 2023-06-29 ENCOUNTER — HOSPITAL ENCOUNTER (OUTPATIENT)
Facility: HOSPITAL | Age: 83
Discharge: HOME | End: 2023-06-29
Attending: INTERNAL MEDICINE | Admitting: INTERNAL MEDICINE
Payer: MEDICARE

## 2023-06-29 DIAGNOSIS — Z95.0 CARDIAC PACEMAKER IN SITU: ICD-10-CM

## 2023-06-29 DIAGNOSIS — Z45.010 ENCOUNTER FOR SERVICING OF PACEMAKER AT END OF BATTERY LIFE: ICD-10-CM

## 2023-06-29 DIAGNOSIS — I48.0 PAF (PAROXYSMAL ATRIAL FIBRILLATION): ICD-10-CM

## 2023-06-29 DIAGNOSIS — I25.5 ISCHEMIC CARDIOMYOPATHY: ICD-10-CM

## 2023-06-29 DIAGNOSIS — I50.22 CHRONIC SYSTOLIC CONGESTIVE HEART FAILURE: Primary | ICD-10-CM

## 2023-06-29 DIAGNOSIS — I48.0 PAROXYSMAL ATRIAL FIBRILLATION: ICD-10-CM

## 2023-06-29 DIAGNOSIS — M81.0 AGE-RELATED OSTEOPOROSIS WITHOUT CURRENT PATHOLOGICAL FRACTURE: ICD-10-CM

## 2023-06-29 DIAGNOSIS — I50.32 CHRONIC DIASTOLIC HEART FAILURE: ICD-10-CM

## 2023-06-29 PROCEDURE — 93010 ELECTROCARDIOGRAM REPORT: CPT | Mod: HCNC,,, | Performed by: INTERNAL MEDICINE

## 2023-06-29 PROCEDURE — 75820 VEIN X-RAY ARM/LEG: CPT | Mod: 26,HCNC,LT, | Performed by: INTERNAL MEDICINE

## 2023-06-29 PROCEDURE — 93010 EKG 12-LEAD: ICD-10-PCS | Mod: HCNC,,, | Performed by: INTERNAL MEDICINE

## 2023-06-29 PROCEDURE — 99212 OFFICE O/P EST SF 10 MIN: CPT | Mod: HCNC,,, | Performed by: INTERNAL MEDICINE

## 2023-06-29 PROCEDURE — 36005 PR INJECTION PROC,EXTREMITY,VENOGRAPHY: ICD-10-PCS | Mod: HCNC,LT,, | Performed by: INTERNAL MEDICINE

## 2023-06-29 PROCEDURE — 75820 VEIN X-RAY ARM/LEG: CPT | Mod: HCNC,LT | Performed by: INTERNAL MEDICINE

## 2023-06-29 PROCEDURE — 36005 INJECTION EXT VENOGRAPHY: CPT | Mod: HCNC,LT,, | Performed by: INTERNAL MEDICINE

## 2023-06-29 PROCEDURE — 25500020 PHARM REV CODE 255: Mod: HCNC | Performed by: INTERNAL MEDICINE

## 2023-06-29 PROCEDURE — 36005 INJECTION EXT VENOGRAPHY: CPT | Mod: HCNC,LT | Performed by: INTERNAL MEDICINE

## 2023-06-29 PROCEDURE — 93005 ELECTROCARDIOGRAM TRACING: CPT | Mod: HCNC

## 2023-06-29 PROCEDURE — 99212 PR OFFICE/OUTPT VISIT, EST, LEVL II, 10-19 MIN: ICD-10-PCS | Mod: HCNC,,, | Performed by: INTERNAL MEDICINE

## 2023-06-29 PROCEDURE — 75820 PR VENOGRAM EXTREMITY UNILAT: ICD-10-PCS | Mod: 26,HCNC,LT, | Performed by: INTERNAL MEDICINE

## 2023-06-29 RX ORDER — FUROSEMIDE 40 MG/1
40 TABLET ORAL DAILY
Start: 2023-06-29 | End: 2023-12-19 | Stop reason: SDUPTHER

## 2023-06-29 NOTE — PLAN OF CARE
Went over discharge instructions with patient.   Stressed importance of making and keeping all follow ups as well as making prescribed medication changes.   Gave education material for safe mobility after discharge.   Walked patient to ensure patient was safe to go home.   Patient verbalized understanding and has no questions in regards to discharge.  IV's removed, catheter intact.  Primary nurse notified of pt's discharge status.   Made sure patient has someone to drive them and explained not to drive for 24 hours.

## 2023-06-29 NOTE — Clinical Note
20 ml of contrast were injected throughout the case. 0 mL of contrast was the total wasted during the case. 20 mL was the total amount used during the case.

## 2023-06-29 NOTE — Clinical Note
A pre-sedation assessment was not completed by the physician immediately prior to sedation start. Pt not receiving sedation

## 2023-06-29 NOTE — Clinical Note
The left brachial was prepped. The site was prepped with ChloraPrep. The patient was positioned supine. Venogram through IV access

## 2023-06-29 NOTE — DISCHARGE INSTRUCTIONS
WOUND CARE: It is not unusual to have tenderness at the incision site. Remove the dressing as instructed by your physician    DISCOMFORT: For general discomfort at the incision site, ou may take over the counter acetaminophen as directed on the bottle.

## 2023-06-29 NOTE — Clinical Note
A venogram was performed in the left axillary vein. The vessel was injected via hand injection  with 20 mL of contrast. Through existing IV

## 2023-07-02 NOTE — DISCHARGE SUMMARY
[facility] - Cath Lab (Hospital)  Discharge Note  Short Stay    Procedure(s) (LRB):  Venogram, Cath Lab (Bilateral)      OUTCOME: Patient tolerated treatment/procedure well without complication and is now ready for discharge.    DISPOSITION: Home or Self Care    FINAL DIAGNOSIS:  Patent left subclavian system around existing pacing Leads.    FOLLOWUP: In clinic    DISCHARGE INSTRUCTIONS:  Plan on the addition of a his lead.       Clinical Reference Documents Added to Patient Instructions         Document    UPPER EXTREMITY VENOGRAPHY (ENGLISH)            TIME SPENT ON DISCHARGE:  20 minutes

## 2023-07-03 VITALS
HEART RATE: 71 BPM | WEIGHT: 132.5 LBS | BODY MASS INDEX: 22.62 KG/M2 | HEIGHT: 64 IN | TEMPERATURE: 98 F | SYSTOLIC BLOOD PRESSURE: 181 MMHG | RESPIRATION RATE: 18 BRPM | DIASTOLIC BLOOD PRESSURE: 85 MMHG | OXYGEN SATURATION: 100 %

## 2023-07-03 DIAGNOSIS — I50.22 CHRONIC SYSTOLIC CONGESTIVE HEART FAILURE: ICD-10-CM

## 2023-07-03 DIAGNOSIS — I50.43 ACUTE ON CHRONIC COMBINED SYSTOLIC AND DIASTOLIC CONGESTIVE HEART FAILURE: ICD-10-CM

## 2023-07-03 DIAGNOSIS — I50.9 CONGESTIVE HEART FAILURE, UNSPECIFIED HF CHRONICITY, UNSPECIFIED HEART FAILURE TYPE: ICD-10-CM

## 2023-07-03 DIAGNOSIS — I50.32 CHRONIC DIASTOLIC HEART FAILURE: ICD-10-CM

## 2023-07-03 DIAGNOSIS — Z95.0 CARDIAC PACEMAKER IN SITU: Primary | ICD-10-CM

## 2023-07-03 DIAGNOSIS — I48.0 PAF (PAROXYSMAL ATRIAL FIBRILLATION): ICD-10-CM

## 2023-07-05 ENCOUNTER — TELEPHONE (OUTPATIENT)
Dept: CARDIOLOGY | Facility: CLINIC | Age: 83
End: 2023-07-05
Payer: MEDICARE

## 2023-07-05 NOTE — TELEPHONE ENCOUNTER
7/5/23 at 630PM  Recalled patient and daughter confirmed rescheduling date/time to Thursday 7/13 for 930am  Reviewed briefly instructions and answered many questions   Need to clarify holding Eliquis, Aspirin and Plavix which patient stated she was told to hold all 3 days before procedure  Will call back in am but to resume all medications tonight      7/5/23 at 540PM  Attempted to reach patient on her phone line x 2  Spoke with daughter, Leah and will call back after 630 to discuss rescheduling date/time  Apologies that equipment would not be available and need to reschedule procedure

## 2023-07-06 NOTE — TELEPHONE ENCOUNTER
Telephoned patient and advised regarding OAC and Antiplatelet Therapy as per Dr. Nesbitt's advice below      Velasquez Vizcaino MD     No, Hold Eliquis only - 48 hrs pre-op - will resume a couple days post -op likely after clinic visit for wound   ASA and Plavix can be taken on night before - hold am of surgery dose but will resume right away post op as usualTx

## 2023-07-13 ENCOUNTER — ANESTHESIA EVENT (OUTPATIENT)
Dept: CARDIOLOGY | Facility: HOSPITAL | Age: 83
End: 2023-07-13
Payer: MEDICARE

## 2023-07-13 ENCOUNTER — ANESTHESIA (OUTPATIENT)
Dept: CARDIOLOGY | Facility: HOSPITAL | Age: 83
End: 2023-07-13
Payer: MEDICARE

## 2023-07-13 ENCOUNTER — HOSPITAL ENCOUNTER (OUTPATIENT)
Facility: HOSPITAL | Age: 83
Discharge: HOME | End: 2023-07-13
Attending: INTERNAL MEDICINE | Admitting: INTERNAL MEDICINE
Payer: MEDICARE

## 2023-07-13 DIAGNOSIS — I50.43 ACUTE ON CHRONIC COMBINED SYSTOLIC AND DIASTOLIC CONGESTIVE HEART FAILURE: ICD-10-CM

## 2023-07-13 DIAGNOSIS — I50.32 CHRONIC DIASTOLIC HEART FAILURE: ICD-10-CM

## 2023-07-13 DIAGNOSIS — I49.9 ARRHYTHMIA: ICD-10-CM

## 2023-07-13 DIAGNOSIS — I25.5 ISCHEMIC CARDIOMYOPATHY: ICD-10-CM

## 2023-07-13 DIAGNOSIS — I50.22 CHRONIC SYSTOLIC CONGESTIVE HEART FAILURE: ICD-10-CM

## 2023-07-13 DIAGNOSIS — I48.0 PAF (PAROXYSMAL ATRIAL FIBRILLATION): ICD-10-CM

## 2023-07-13 DIAGNOSIS — Z45.010 ENCOUNTER FOR SERVICING OF PACEMAKER AT END OF BATTERY LIFE: ICD-10-CM

## 2023-07-13 DIAGNOSIS — I50.9 CONGESTIVE HEART FAILURE, UNSPECIFIED HF CHRONICITY, UNSPECIFIED HEART FAILURE TYPE: ICD-10-CM

## 2023-07-13 DIAGNOSIS — Z95.0 CARDIAC PACEMAKER IN SITU: ICD-10-CM

## 2023-07-13 DIAGNOSIS — Z95.0 BIVENTRICULAR CARDIAC PACEMAKER IN SITU: Primary | ICD-10-CM

## 2023-07-13 PROCEDURE — C2621 PMKR, OTHER THAN SING/DUAL: HCPCS | Mod: HCNC | Performed by: INTERNAL MEDICINE

## 2023-07-13 PROCEDURE — C1894 INTRO/SHEATH, NON-LASER: HCPCS | Mod: HCNC | Performed by: INTERNAL MEDICINE

## 2023-07-13 PROCEDURE — 93010 EKG 12-LEAD: ICD-10-PCS | Mod: HCNC,76,, | Performed by: STUDENT IN AN ORGANIZED HEALTH CARE EDUCATION/TRAINING PROGRAM

## 2023-07-13 PROCEDURE — 33207 PR INSER HART PACER XVENOUS VENTR: ICD-10-PCS | Mod: HCNC,,, | Performed by: INTERNAL MEDICINE

## 2023-07-13 PROCEDURE — 37000009 HC ANESTHESIA EA ADD 15 MINS: Mod: HCNC | Performed by: INTERNAL MEDICINE

## 2023-07-13 PROCEDURE — 99213 OFFICE O/P EST LOW 20 MIN: CPT | Mod: HCNC,,, | Performed by: INTERNAL MEDICINE

## 2023-07-13 PROCEDURE — 63600175 PHARM REV CODE 636 W HCPCS: Mod: HCNC | Performed by: INTERNAL MEDICINE

## 2023-07-13 PROCEDURE — 63600175 PHARM REV CODE 636 W HCPCS: Mod: HCNC | Performed by: NURSE ANESTHETIST, CERTIFIED REGISTERED

## 2023-07-13 PROCEDURE — C1769 GUIDE WIRE: HCPCS | Mod: HCNC | Performed by: INTERNAL MEDICINE

## 2023-07-13 PROCEDURE — 33233 PR REMV PERM PACER GENERATOR: ICD-10-PCS | Mod: HCNC,,, | Performed by: INTERNAL MEDICINE

## 2023-07-13 PROCEDURE — 93005 ELECTROCARDIOGRAM TRACING: CPT | Mod: HCNC

## 2023-07-13 PROCEDURE — 93010 ELECTROCARDIOGRAM REPORT: CPT | Mod: HCNC,76,, | Performed by: STUDENT IN AN ORGANIZED HEALTH CARE EDUCATION/TRAINING PROGRAM

## 2023-07-13 PROCEDURE — 37000008 HC ANESTHESIA 1ST 15 MINUTES: Mod: HCNC | Performed by: INTERNAL MEDICINE

## 2023-07-13 PROCEDURE — C1898 LEAD, PMKR, OTHER THAN TRANS: HCPCS | Mod: HCNC | Performed by: INTERNAL MEDICINE

## 2023-07-13 PROCEDURE — 25000003 PHARM REV CODE 250: Mod: HCNC | Performed by: INTERNAL MEDICINE

## 2023-07-13 PROCEDURE — 33233 REMOVAL OF PM GENERATOR: CPT | Mod: HCNC,,, | Performed by: INTERNAL MEDICINE

## 2023-07-13 PROCEDURE — 33233 REMOVAL OF PM GENERATOR: CPT | Mod: HCNC | Performed by: INTERNAL MEDICINE

## 2023-07-13 PROCEDURE — 33207 INSERT HEART PM VENTRICULAR: CPT | Mod: HCNC,,, | Performed by: INTERNAL MEDICINE

## 2023-07-13 PROCEDURE — 33207 INSERT HEART PM VENTRICULAR: CPT | Mod: HCNC,SC | Performed by: INTERNAL MEDICINE

## 2023-07-13 PROCEDURE — 27201423 OPTIME MED/SURG SUP & DEVICES STERILE SUPPLY: Mod: HCNC | Performed by: INTERNAL MEDICINE

## 2023-07-13 PROCEDURE — C1730 CATH, EP, 19 OR FEW ELECT: HCPCS | Mod: HCNC | Performed by: INTERNAL MEDICINE

## 2023-07-13 PROCEDURE — 99213 PR OFFICE/OUTPT VISIT, EST, LEVL III, 20-29 MIN: ICD-10-PCS | Mod: HCNC,,, | Performed by: INTERNAL MEDICINE

## 2023-07-13 PROCEDURE — C1882 AICD, OTHER THAN SING/DUAL: HCPCS | Mod: HCNC | Performed by: INTERNAL MEDICINE

## 2023-07-13 DEVICE — LEAD PACE BIPOLAR CATH 74CM: Type: IMPLANTABLE DEVICE | Site: HEART | Status: FUNCTIONAL

## 2023-07-13 DEVICE — CRTP W1TR01 PERCEPTA CRTP MRI US
Type: IMPLANTABLE DEVICE | Site: CHEST  WALL | Status: FUNCTIONAL
Brand: PERCEPTA™ CRT-P MRI SURESCAN™

## 2023-07-13 RX ORDER — KETOROLAC TROMETHAMINE 10 MG/1
10 TABLET, FILM COATED ORAL EVERY 6 HOURS
Qty: 20 TABLET | Refills: 0 | Status: SHIPPED | OUTPATIENT
Start: 2023-07-13 | End: 2023-07-13 | Stop reason: ALTCHOICE

## 2023-07-13 RX ORDER — VANCOMYCIN HYDROCHLORIDE 1 G/20ML
INJECTION, POWDER, LYOPHILIZED, FOR SOLUTION INTRAVENOUS
Status: DISCONTINUED | OUTPATIENT
Start: 2023-07-13 | End: 2023-07-17 | Stop reason: HOSPADM

## 2023-07-13 RX ORDER — CEFADROXIL 500 MG/1
CAPSULE ORAL
Qty: 6 CAPSULE | Refills: 0 | Status: SHIPPED | OUTPATIENT
Start: 2023-07-13 | End: 2023-12-19

## 2023-07-13 RX ORDER — HYDROCODONE BITARTRATE AND ACETAMINOPHEN 10; 325 MG/1; MG/1
1 TABLET ORAL EVERY 6 HOURS PRN
Qty: 28 TABLET | Refills: 0 | Status: SHIPPED | OUTPATIENT
Start: 2023-07-13 | End: 2023-07-25 | Stop reason: DRUGHIGH

## 2023-07-13 RX ORDER — PROPOFOL 10 MG/ML
VIAL (ML) INTRAVENOUS CONTINUOUS PRN
Status: DISCONTINUED | OUTPATIENT
Start: 2023-07-13 | End: 2023-07-13

## 2023-07-13 RX ORDER — LIDOCAINE HYDROCHLORIDE 20 MG/ML
INJECTION, SOLUTION EPIDURAL; INFILTRATION; INTRACAUDAL; PERINEURAL
Status: DISCONTINUED | OUTPATIENT
Start: 2023-07-13 | End: 2023-07-17 | Stop reason: HOSPADM

## 2023-07-13 RX ORDER — HYDROCODONE BITARTRATE AND ACETAMINOPHEN 10; 325 MG/1; MG/1
1 TABLET ORAL ONCE
Status: COMPLETED | OUTPATIENT
Start: 2023-07-13 | End: 2023-07-13

## 2023-07-13 RX ORDER — FENTANYL CITRATE 50 UG/ML
INJECTION, SOLUTION INTRAMUSCULAR; INTRAVENOUS
Status: DISCONTINUED | OUTPATIENT
Start: 2023-07-13 | End: 2023-07-13

## 2023-07-13 RX ORDER — KETOROLAC TROMETHAMINE 10 MG/1
10 TABLET, FILM COATED ORAL EVERY 6 HOURS
Qty: 20 TABLET | Refills: 0 | Status: SHIPPED | OUTPATIENT
Start: 2023-07-13 | End: 2023-07-13

## 2023-07-13 RX ORDER — KETOROLAC TROMETHAMINE 30 MG/ML
15 INJECTION, SOLUTION INTRAMUSCULAR; INTRAVENOUS EVERY 6 HOURS PRN
Status: DISCONTINUED | OUTPATIENT
Start: 2023-07-13 | End: 2023-07-16 | Stop reason: HOSPADM

## 2023-07-13 RX ORDER — CEFAZOLIN SODIUM 1 G/3ML
INJECTION, POWDER, FOR SOLUTION INTRAMUSCULAR; INTRAVENOUS
Status: DISCONTINUED | OUTPATIENT
Start: 2023-07-13 | End: 2023-07-13

## 2023-07-13 RX ORDER — FENTANYL CITRATE 50 UG/ML
INJECTION, SOLUTION INTRAMUSCULAR; INTRAVENOUS
Status: DISCONTINUED | OUTPATIENT
Start: 2023-07-13 | End: 2023-07-17 | Stop reason: HOSPADM

## 2023-07-13 RX ORDER — CEFADROXIL 500 MG/1
CAPSULE ORAL
Qty: 6 CAPSULE | Refills: 0 | Status: SHIPPED | OUTPATIENT
Start: 2023-07-13 | End: 2023-07-13

## 2023-07-13 RX ORDER — ACETAMINOPHEN 325 MG/1
650 TABLET ORAL EVERY 4 HOURS PRN
Status: DISCONTINUED | OUTPATIENT
Start: 2023-07-13 | End: 2023-07-17 | Stop reason: HOSPADM

## 2023-07-13 RX ORDER — CEFAZOLIN SODIUM 2 G/50ML
2 SOLUTION INTRAVENOUS
Status: DISCONTINUED | OUTPATIENT
Start: 2023-07-13 | End: 2023-07-17 | Stop reason: HOSPADM

## 2023-07-13 RX ADMIN — SODIUM CHLORIDE, POTASSIUM CHLORIDE, SODIUM LACTATE AND CALCIUM CHLORIDE: 600; 310; 30; 20 INJECTION, SOLUTION INTRAVENOUS at 09:07

## 2023-07-13 RX ADMIN — FENTANYL CITRATE 25 MCG: 50 INJECTION, SOLUTION INTRAMUSCULAR; INTRAVENOUS at 09:07

## 2023-07-13 RX ADMIN — CEFAZOLIN 2 G: 330 INJECTION, POWDER, FOR SOLUTION INTRAMUSCULAR; INTRAVENOUS at 09:07

## 2023-07-13 RX ADMIN — HYDROCODONE BITARTRATE AND ACETAMINOPHEN 1 TABLET: 10; 325 TABLET ORAL at 02:07

## 2023-07-13 RX ADMIN — PROPOFOL 25 MCG/KG/MIN: 10 INJECTION, EMULSION INTRAVENOUS at 09:07

## 2023-07-13 NOTE — ANESTHESIA PREPROCEDURE EVALUATION
07/13/2023  Violet Swenson is a 83 y.o., female.  Patient Active Problem List   Diagnosis    Essential hypertension    Mixed hyperlipidemia    Hypothyroidism (acquired)    Recurrent major depressive disorder, in full remission    NSAID long-term use    Idiopathic chronic gout, multiple sites, without tophus (tophi)    Primary osteoarthritis involving multiple joints    Coronary artery disease : multiple vessels, s/p PTCA    Angina pectoris    Ischemic cardiomyopathy    Diffuse large B cell lymphoma    Chronic anemia    S/P gastric bypass    Diffusion capacity of lung (dl), decreased    Atherosclerosis of aorta    Situational anxiety    Insomnia secondary to anxiety    Seasonal allergic rhinitis    Abnormal LFTs (liver function tests)    Osteopenia    Stage 3b chronic kidney disease    Chemotherapy-induced neuropathy    S/P TKR (total knee replacement), bilateral    Atrial fibrillation    Chronic midline low back pain with right-sided sciatica    Hyperparathyroidism    Vitamin D deficiency    Age-related osteoporosis without current pathological fracture    Postural hypotension    Cardiac pacemaker in situ    Macrocytic anemia    Hyperkalemia    Sacroiliac joint dysfunction    Chronic diastolic heart failure    At high risk for hypocalcemia    Calcified granuloma of lung    Immunosuppression due to drug therapy    Chronic pain of left ankle    Undifferentiated inflammatory arthritis    Pedal edema    Centromere antibody positive    Rheumatoid arthritis involving multiple sites with positive rheumatoid factor    Chronic systolic congestive heart failure       Pre-op Assessment    I have reviewed the Patient Summary Reports.    I have reviewed the Nursing Notes. I have reviewed the NPO Status.   I have reviewed the Medications.     Review of  Systems  Anesthesia Hx:  No problems with previous Anesthesia  Denies Family Hx of Anesthesia complications.   Denies Personal Hx of Anesthesia complications.   Cardiovascular:   Exercise tolerance: poor Hypertension CAD  Dysrhythmias atrial fibrillation Angina CHF Negative stress test  Ef >50%   Pulmonary:   Pneumonia    Renal/:   Chronic Renal Disease, CKD    Hepatic/GI:   GERD    Musculoskeletal:   Arthritis     Neurological:   CVA Neuromuscular Disease,    Endocrine:   Hypothyroidism    Psych:   Psychiatric History          Physical Exam  General:  Well nourished      Airway/Jaw/Neck:  Airway Findings: Mouth Opening: Normal   Tongue: Normal   General Airway Assessment: Adult Mallampati: II  TM Distance: 4 - 6 cm   Jaw/Neck Findings:  Neck ROM: Normal ROM       Dental:  Dental Findings: In tact     Chest/Lungs:  Chest/Lungs Findings: Clear to auscultation, Normal Respiratory Rate      Heart/Vascular:  Heart Findings: Rate: Normal  Rhythm: Irregularly Irregular  Sounds: Normal        Mental Status:  Mental Status Findings:  Cooperative, Alert and Oriented         Anesthesia Plan  Type of Anesthesia, risks & benefits discussed:  Anesthesia Type:  MAC    Patient's Preference:   Plan Factors:          Intra-op Monitoring Plan: standard ASA monitors  Intra-op Monitoring Plan Comments:   Post Op Pain Control Plan: per primary service following discharge from PACU  Post Op Pain Control Plan Comments:     Induction:   IV  Beta Blocker:         Informed Consent: Informed consent signed with the Patient and all parties understand the risks and agree with anesthesia plan.  All questions answered.  Anesthesia consent signed with patient.  ASA Score: 3     Day of Surgery Review of History & Physical: I have interviewed and examined the patient. I have reviewed the patient's H&P dated:  There are no significant changes.            Ready For Surgery From Anesthesia Perspective.           Physical Exam  General: Well  nourished    Airway:  Mallampati: II   Mouth Opening: Normal  TM Distance: 4 - 6 cm  Tongue: Normal  Neck ROM: Normal ROM    Dental:  In tact    Chest/Lungs:  Clear to auscultation, Normal Respiratory Rate    Heart:  Rate: Normal  Rhythm: Irregularly Irregular  Sounds: Normal          Anesthesia Plan  Type of Anesthesia, risks & benefits discussed:    Anesthesia Type: MAC  Intra-op Monitoring Plan: standard ASA monitors  Post Op Pain Control Plan: per primary service following discharge from PACU  Induction:  IV  Informed Consent: Informed consent signed with the Patient and all parties understand the risks and agree with anesthesia plan.  All questions answered.   ASA Score: 3  Day of Surgery Review of History & Physical: I have interviewed and examined the patient. I have reviewed the patient's H&P dated:     Ready For Surgery From Anesthesia Perspective.       .

## 2023-07-13 NOTE — PLAN OF CARE
Went over discharge instructions with patient.   Stressed importance of making and keeping all follow ups as well as making prescribed medication changes.   Gave education material for safe mobility after discharge.   Walked patient to ensure patient was safe to go home.   Patient verbalized understanding and has no questions in regards to discharge.  IV removed, catheter intact.  Primary nurse notified of pt's discharge status.   Made sure patient has someone to drive them and explained not to drive for 24 hours.   Dressing to right groin CDI with no signs of bleeding or hematoma.        Dressing to left upper chest wall CDI.

## 2023-07-13 NOTE — Clinical Note
The groin and chest was prepped. The site was prepped with ChloraPrep. The site was clipped. The patient was draped. The patient was positioned supine. The patient was secured using safety straps and to an armboard.

## 2023-07-13 NOTE — Clinical Note
The defib pads were placed on the patient's Chest and Back. Erivedge Counseling- I discussed with the patient the risks of Erivedge including but not limited to nausea, vomiting, diarrhea, constipation, weight loss, changes in the sense of taste, decreased appetite, muscle spasms, and hair loss.  The patient verbalized understanding of the proper use and possible adverse effects of Erivedge.  All of the patient's questions and concerns were addressed.

## 2023-07-13 NOTE — INTERVAL H&P NOTE
The patient has been examined and the H&P has been reviewed:    I concur with the findings and no changes have occurred since H&P was written.    Procedure risks, benefits and alternative options discussed and understood by patient/family.  She had a venogram 2 days ago and the L sided veins are patent - this simplifies access/procedural upgrade techniques  Will add LV lead today.           There are no hospital problems to display for this patient.

## 2023-07-13 NOTE — ANESTHESIA POSTPROCEDURE EVALUATION
Anesthesia Post Evaluation    Patient: Violet Swenson    Procedure(s) Performed: Procedure(s) (LRB):  Biventricular pacemaker upgrade/His lead vs CRT-P (Left)    Final Anesthesia Type: MAC      Patient location: [facility].  Patient participation: Yes- Able to Participate  Level of consciousness: awake and alert  Post-procedure vital signs: reviewed and stable  Pain management: adequate  Airway patency: patent  JAMARCUS mitigation strategies: Multimodal analgesia  PONV status at discharge: No PONV  Anesthetic complications: no      Cardiovascular status: blood pressure returned to baseline  Respiratory status: unassisted and spontaneous ventilation  Hydration status: euvolemic  Follow-up not needed.          Vitals Value Taken Time   /97 07/13/23 1432   Temp 36.6 °C (97.9 °F) 07/13/23 1230   Pulse 60 07/13/23 1434   Resp 13 07/13/23 1434   SpO2 99 % 07/13/23 1434   Vitals shown include unvalidated device data.      No case tracking events are documented in the log.      Pain/Prosper Score: Pain Rating Prior to Med Admin: 10 (7/13/2023  2:10 PM)  Prosper Score: 10 (7/13/2023 12:30 PM)         109

## 2023-07-13 NOTE — TRANSFER OF CARE
"Anesthesia Transfer of Care Note    Patient: Violet Swenson    Procedure(s) Performed: Procedure(s) (LRB):  Biventricular pacemaker upgrade/His lead vs CRT-P (Left)    Patient location: Cath Lab    Anesthesia Type: MAC    Post pain: adequate analgesia    Post assessment: no apparent anesthetic complications    Post vital signs: stable    Level of consciousness: awake and alert    Nausea/Vomiting: no nausea/vomiting    Complications: none    Transfer of care protocol was followed      Last vitals:   Visit Vitals  BP (!) 186/86 (BP Location: Left arm, Patient Position: Lying)   Pulse 65   Temp 36.2 °C (97.2 °F) (Temporal)   Resp 18   Ht 5' 4" (1.626 m)   Wt 57.6 kg (127 lb)   SpO2 100%   Breastfeeding No   BMI 21.80 kg/m²     "

## 2023-07-17 ENCOUNTER — HOSPITAL ENCOUNTER (OUTPATIENT)
Dept: CARDIOLOGY | Facility: HOSPITAL | Age: 83
Discharge: HOME | End: 2023-07-17
Attending: INTERNAL MEDICINE
Payer: MEDICARE

## 2023-07-17 DIAGNOSIS — I50.32 CHRONIC DIASTOLIC HEART FAILURE: ICD-10-CM

## 2023-07-17 DIAGNOSIS — I25.5 ISCHEMIC CARDIOMYOPATHY: ICD-10-CM

## 2023-07-17 DIAGNOSIS — I50.22 CHRONIC SYSTOLIC CONGESTIVE HEART FAILURE: ICD-10-CM

## 2023-07-17 DIAGNOSIS — Z95.0 BIVENTRICULAR CARDIAC PACEMAKER IN SITU: ICD-10-CM

## 2023-07-17 PROCEDURE — 93281 CARDIAC DEVICE CHECK - IN CLINIC & HOSPITAL: ICD-10-PCS | Mod: 26,HCNC,, | Performed by: INTERNAL MEDICINE

## 2023-07-17 PROCEDURE — 93281 PM DEVICE PROGR EVAL MULTI: CPT | Mod: 26,HCNC,, | Performed by: INTERNAL MEDICINE

## 2023-07-17 PROCEDURE — 93281 PM DEVICE PROGR EVAL MULTI: CPT | Mod: HCNC

## 2023-07-25 ENCOUNTER — TELEPHONE (OUTPATIENT)
Dept: CARDIOLOGY | Facility: CLINIC | Age: 83
End: 2023-07-25
Payer: MEDICARE

## 2023-07-25 VITALS
OXYGEN SATURATION: 99 % | HEIGHT: 64 IN | SYSTOLIC BLOOD PRESSURE: 159 MMHG | BODY MASS INDEX: 21.68 KG/M2 | TEMPERATURE: 98 F | RESPIRATION RATE: 18 BRPM | HEART RATE: 60 BPM | WEIGHT: 127 LBS | DIASTOLIC BLOOD PRESSURE: 92 MMHG

## 2023-07-25 RX ORDER — HYDROCODONE BITARTRATE AND ACETAMINOPHEN 10; 325 MG/1; MG/1
1 TABLET ORAL EVERY 6 HOURS PRN
Qty: 28 TABLET | Refills: 0 | Status: SHIPPED | OUTPATIENT
Start: 2023-07-25 | End: 2023-12-19

## 2023-07-25 NOTE — PLAN OF CARE
Dr Vizcaino notified of pt's desire to speak with him.  MD called and spoke with pt; follow up appointment requested for this Friday at the [facility] location.

## 2023-07-25 NOTE — TELEPHONE ENCOUNTER
Patient was called by [facility] Staff for post op follow up and voiced complaints of pain around pacemaker.  [facility] Nurse informed Dr. Nesbitt and he requested an appointment on Friday with him and [facility]  Telephoned patient and advised informed of appointment time at [facility] for 1:00PM

## 2023-07-27 ENCOUNTER — PATIENT MESSAGE (OUTPATIENT)
Dept: CARDIOLOGY | Facility: CLINIC | Age: 83
End: 2023-07-27
Payer: MEDICARE

## 2023-07-28 ENCOUNTER — CLINICAL SUPPORT (OUTPATIENT)
Dept: CARDIOLOGY | Facility: HOSPITAL | Age: 83
End: 2023-07-28
Attending: INTERNAL MEDICINE
Payer: MEDICARE

## 2023-07-28 ENCOUNTER — OFFICE VISIT (OUTPATIENT)
Dept: CARDIOLOGY | Facility: CLINIC | Age: 83
End: 2023-07-28
Payer: MEDICARE

## 2023-07-28 VITALS
HEIGHT: 64 IN | BODY MASS INDEX: 22.62 KG/M2 | HEART RATE: 62 BPM | WEIGHT: 132.5 LBS | OXYGEN SATURATION: 99 % | SYSTOLIC BLOOD PRESSURE: 128 MMHG | DIASTOLIC BLOOD PRESSURE: 70 MMHG

## 2023-07-28 DIAGNOSIS — I50.22 CHRONIC SYSTOLIC CONGESTIVE HEART FAILURE: ICD-10-CM

## 2023-07-28 DIAGNOSIS — Z95.0 CARDIAC PACEMAKER IN SITU: ICD-10-CM

## 2023-07-28 DIAGNOSIS — E21.3 HYPERPARATHYROIDISM: ICD-10-CM

## 2023-07-28 DIAGNOSIS — E78.2 MIXED HYPERLIPIDEMIA: Chronic | ICD-10-CM

## 2023-07-28 DIAGNOSIS — I48.0 PAROXYSMAL ATRIAL FIBRILLATION: Primary | ICD-10-CM

## 2023-07-28 DIAGNOSIS — I50.32 CHRONIC DIASTOLIC HEART FAILURE: ICD-10-CM

## 2023-07-28 DIAGNOSIS — Z95.0 BIVENTRICULAR CARDIAC PACEMAKER IN SITU: ICD-10-CM

## 2023-07-28 DIAGNOSIS — I25.5 ISCHEMIC CARDIOMYOPATHY: ICD-10-CM

## 2023-07-28 DIAGNOSIS — Z98.84 S/P GASTRIC BYPASS: Chronic | ICD-10-CM

## 2023-07-28 DIAGNOSIS — Z79.1 NSAID LONG-TERM USE: ICD-10-CM

## 2023-07-28 DIAGNOSIS — I70.0 ATHEROSCLEROSIS OF AORTA: ICD-10-CM

## 2023-07-28 DIAGNOSIS — D53.9 MACROCYTIC ANEMIA: ICD-10-CM

## 2023-07-28 DIAGNOSIS — C83.30 DIFFUSE LARGE B-CELL LYMPHOMA, UNSPECIFIED BODY REGION: ICD-10-CM

## 2023-07-28 DIAGNOSIS — I95.1 POSTURAL HYPOTENSION: ICD-10-CM

## 2023-07-28 DIAGNOSIS — I25.10 CORONARY ARTERY DISEASE INVOLVING NATIVE CORONARY ARTERY OF NATIVE HEART WITHOUT ANGINA PECTORIS: Chronic | ICD-10-CM

## 2023-07-28 DIAGNOSIS — E03.9 HYPOTHYROIDISM (ACQUIRED): Chronic | ICD-10-CM

## 2023-07-28 DIAGNOSIS — I25.5 ISCHEMIC CARDIOMYOPATHY: Chronic | ICD-10-CM

## 2023-07-28 DIAGNOSIS — I10 ESSENTIAL HYPERTENSION: Chronic | ICD-10-CM

## 2023-07-28 PROCEDURE — 3288F FALL RISK ASSESSMENT DOCD: CPT | Mod: HCNC,CPTII,S$GLB, | Performed by: INTERNAL MEDICINE

## 2023-07-28 PROCEDURE — 1125F AMNT PAIN NOTED PAIN PRSNT: CPT | Mod: HCNC,CPTII,S$GLB, | Performed by: INTERNAL MEDICINE

## 2023-07-28 PROCEDURE — 1125F PR PAIN SEVERITY QUANTIFIED, PAIN PRESENT: ICD-10-PCS | Mod: HCNC,CPTII,S$GLB, | Performed by: INTERNAL MEDICINE

## 2023-07-28 PROCEDURE — 3074F SYST BP LT 130 MM HG: CPT | Mod: HCNC,CPTII,S$GLB, | Performed by: INTERNAL MEDICINE

## 2023-07-28 PROCEDURE — 3078F DIAST BP <80 MM HG: CPT | Mod: HCNC,CPTII,S$GLB, | Performed by: INTERNAL MEDICINE

## 2023-07-28 PROCEDURE — 1101F PR PT FALLS ASSESS DOC 0-1 FALLS W/OUT INJ PAST YR: ICD-10-PCS | Mod: HCNC,CPTII,S$GLB, | Performed by: INTERNAL MEDICINE

## 2023-07-28 PROCEDURE — 3288F PR FALLS RISK ASSESSMENT DOCUMENTED: ICD-10-PCS | Mod: HCNC,CPTII,S$GLB, | Performed by: INTERNAL MEDICINE

## 2023-07-28 PROCEDURE — 3078F PR MOST RECENT DIASTOLIC BLOOD PRESSURE < 80 MM HG: ICD-10-PCS | Mod: HCNC,CPTII,S$GLB, | Performed by: INTERNAL MEDICINE

## 2023-07-28 PROCEDURE — 1160F PR REVIEW ALL MEDS BY PRESCRIBER/CLIN PHARMACIST DOCUMENTED: ICD-10-PCS | Mod: HCNC,CPTII,S$GLB, | Performed by: INTERNAL MEDICINE

## 2023-07-28 PROCEDURE — 3074F PR MOST RECENT SYSTOLIC BLOOD PRESSURE < 130 MM HG: ICD-10-PCS | Mod: HCNC,CPTII,S$GLB, | Performed by: INTERNAL MEDICINE

## 2023-07-28 PROCEDURE — 99024 POSTOP FOLLOW-UP VISIT: CPT | Mod: HCNC,S$GLB,, | Performed by: INTERNAL MEDICINE

## 2023-07-28 PROCEDURE — 99999 PR PBB SHADOW E&M-EST. PATIENT-LVL V: CPT | Mod: PBBFAC,HCNC,, | Performed by: INTERNAL MEDICINE

## 2023-07-28 PROCEDURE — 1159F PR MEDICATION LIST DOCUMENTED IN MEDICAL RECORD: ICD-10-PCS | Mod: HCNC,CPTII,S$GLB, | Performed by: INTERNAL MEDICINE

## 2023-07-28 PROCEDURE — 99999 PR PBB SHADOW E&M-EST. PATIENT-LVL V: ICD-10-PCS | Mod: PBBFAC,HCNC,, | Performed by: INTERNAL MEDICINE

## 2023-07-28 PROCEDURE — 1160F RVW MEDS BY RX/DR IN RCRD: CPT | Mod: HCNC,CPTII,S$GLB, | Performed by: INTERNAL MEDICINE

## 2023-07-28 PROCEDURE — 1157F ADVNC CARE PLAN IN RCRD: CPT | Mod: HCNC,CPTII,S$GLB, | Performed by: INTERNAL MEDICINE

## 2023-07-28 PROCEDURE — 1101F PT FALLS ASSESS-DOCD LE1/YR: CPT | Mod: HCNC,CPTII,S$GLB, | Performed by: INTERNAL MEDICINE

## 2023-07-28 PROCEDURE — 99024 PR POST-OP FOLLOW-UP VISIT: ICD-10-PCS | Mod: HCNC,S$GLB,, | Performed by: INTERNAL MEDICINE

## 2023-07-28 PROCEDURE — 1157F PR ADVANCE CARE PLAN OR EQUIV PRESENT IN MEDICAL RECORD: ICD-10-PCS | Mod: HCNC,CPTII,S$GLB, | Performed by: INTERNAL MEDICINE

## 2023-07-28 PROCEDURE — 1159F MED LIST DOCD IN RCRD: CPT | Mod: HCNC,CPTII,S$GLB, | Performed by: INTERNAL MEDICINE

## 2023-07-28 RX ORDER — CELECOXIB 100 MG/1
100 CAPSULE ORAL 2 TIMES DAILY
Qty: 42 CAPSULE | Refills: 0 | Status: SHIPPED | OUTPATIENT
Start: 2023-07-28

## 2023-07-28 NOTE — PROGRESS NOTES
"  Subjective:   Patient ID:  Violet Swenson is a 83 y.o. female     Chief complaint:S/P pacemaker crt inplant (Pt c/o of pain at the site states her pain is a 10/10)      HPI  New patient to me. (06/28/2023 )  Referred by Dr Gil for evaluation and management of CHF/AF/PPM   --   Background as gleaned from patient's records and today's interview :  84 yo female with a h/o anemia, anxiety, atrial flutter, lymphoma large cell B, CHF, CAD, depression, GERD, gout, heart failure, HLD, HTN, hypothyroidism kidney disease, lung nodule, obesity, polyneuropathy.  Also CHB with MDT DC implant in 2015  AF noted as of Aug 2021     A year ago had stroke with right arm weakness, slurred speech, and gait instability.    Has had chronic chest pain. Cardiolite was negative.       Admitted in Nov 2021 for AF/CHF/MR >> diuresed , DCCV ii/11/21 - back in AF by 12/1/21     Recent CHF Sx exacerbation with leg swelling etc  >> Lasix dose was doubled.      Seen on 6/22/2023 by Dr Gil who noted:   Has still some residual exertional shortness her weight has been stable her cardiolite is negative she is in afib since 10/22 she is being followed by ep soon. She is compliant with salt intake.      Other relevant data:  EF has been slowly declining over the years - now at 40% despite mod MR  She is 100 % RV paced  Underlying rhythm is AF with AMITA at 33 bpm and RBBB  She continues to be quite short of breath.       Update 08/13/2023 :  She had an upgrade of her pacemaker with the addition of a his lead on 07/13/2023.  Nor went well.  She is however now complaining of severe pain in the "area of the pacemaker but the not anteriorly posteriorly shooting to the back.  This is temporarily relieved by Norco.  Otherwise she feels well  Current Outpatient Medications   Medication Sig    allopurinoL (ZYLOPRIM) 300 MG tablet Take 1 tablet (300 mg total) by mouth once daily.    ascorbic acid, vitamin C, (VITAMIN C) 100 MG tablet Take by mouth " once daily.    aspirin (ECOTRIN) 81 MG EC tablet Take 81 mg by mouth nightly.     azelastine (ASTELIN) 137 mcg (0.1 %) nasal spray 1 spray (137 mcg total) by Nasal route 2 (two) times daily.    busPIRone (BUSPAR) 7.5 MG tablet Take 1 tablet (7.5 mg total) by mouth 2 (two) times a day.    calcitRIOL (ROCALTROL) 0.25 MCG Cap TAKE ONE CAPSULE BY MOUTH EVERY MONDAY, WEDNESDAY AND FRIDAY    clopidogreL (PLAVIX) 75 mg tablet TAKE ONE TABLET BY MOUTH EVERY DAY    diclofenac sodium 1 % Gel Apply 2 g topically once daily. Apply 2 g over painful joints once or twice a day.    DULoxetine (CYMBALTA) 30 MG capsule TAKE ONE CAPSULE BY MOUTH EVERY DAY    furosemide (LASIX) 40 MG tablet Take 1 tablet (40 mg total) by mouth once daily. 1 every day, then 2 tablets every other day    HYDROcodone-acetaminophen (NORCO)  mg per tablet Take 1 tablet by mouth every 6 (six) hours as needed for Pain. Please use sparingly. Start with 1/2 tablet per dose - it may be sufficient.    iron-vitamin C 100-250 mg, ICAR-C, 100-250 mg Tab TAKE ONE TABLET BY MOUTH EVERY DAY    levothyroxine (SYNTHROID) 75 MCG tablet Take 1 tablet (75 mcg total) by mouth before breakfast.    metoprolol tartrate (LOPRESSOR) 25 MG tablet TAKE ONE TABLET BY MOUTH TWICE DAILY    multivitamin (THERAGRAN) per tablet Take 1 tablet by mouth once daily.    pravastatin (PRAVACHOL) 40 MG tablet TAKE ONE TABLET BY MOUTH ONCE DAILY    sertraline (ZOLOFT) 100 MG tablet Take 1.5 tablets (150 mg total) by mouth once daily.    sodium bicarbonate 650 MG tablet TAKE ONE TABLET BY MOUTH TWICE DAILY    VITAMIN D2 1,250 mcg (50,000 unit) capsule TAKE ONE CAPSULE BY MOUTH EVERY 7 DAYS    ZINC ACETATE ORAL Take 250 mg by mouth once daily.     cefadroxil (DURICEF) 500 MG Cap Take 2 capsules by mouth tonight @ 8 pm then take 1 capssule by mouth every 12 hours until done. (Patient not taking: Reported on 7/28/2023)    celecoxib (CELEBREX) 100 MG capsule Take 1 capsule (100 mg total) by mouth  2 (two) times daily.    DIPRIVAN 10 mg/mL infusion     ELIQUIS 2.5 mg Tab TAKE ONE TABLET BY MOUTH TWICE DAILY    fluticasone propionate (FLONASE) 50 mcg/actuation nasal spray 2 sprays (100 mcg total) by Each Nostril route once daily. (Patient not taking: Reported on 7/28/2023)    hydrOXYchloroQUINE (PLAQUENIL) 200 mg tablet Take 1 tablet (200 mg total) by mouth once daily. (Patient not taking: Reported on 7/28/2023)    levocetirizine (XYZAL) 5 MG tablet Take 1 tablet (5 mg total) by mouth every evening. (Patient not taking: Reported on 7/28/2023)    methylPREDNISolone (MEDROL DOSEPACK) 4 mg tablet use as directed (Patient not taking: Reported on 7/28/2023)    nitroGLYCERIN 0.2 mg/hr TD PT24 (NITRODUR) 0.2 mg/hr Place 1 patch onto the skin once daily. (Patient not taking: Reported on 7/28/2023)    pregabalin (LYRICA) 50 MG capsule Take 50 mg by mouth 3 (three) times daily.     Current Facility-Administered Medications   Medication    denosumab (PROLIA) injection 60 mg     Review of Systems     Constitutional: Reviewed  for decreased appetite, weight gain and weight loss.   HENT: Reviewed for nosebleeds.    Eyes:  Reviewed for blurred vision and visual disturbance.   Cardiovascular: Reviewed for chest pain, claudication, cyanosis,dyspnea on exertion, leg swelling, orthopnea,paroxysmal nocturnal dyspnearregular heartbeats, palpitations, near-syncope, and syncope.   Respiratory: Reviewed for cough, shortness of breath, wheezing, sleep disturbances due to breathing and snoring, .    Endocrine: Reviewed for heat intolerance.   Hematologic/Lymphatic: Reviewed for easy bruisability/bleeding.   Skin: Reviewed for rash.   Musculoskeletal: Reviewed for muscle weakness and myalgias.   Gastrointestinal: Reviewed for abdominal pain, anorexia, melena, nausea and vomiting.   Genitourinary: Reviewed for menorrhagia, frequency, nocturia and incontinence.   Neurological: Reviewed for excessive daytime sleepiness, dizziness, vertigo,  weakness, headaches, loss of balance and seizures,   Psychiatric/Behavioral:  Reviewed for insomnia, altered mental status, depression, anxiety and nervousness.       All symptoms reviewed above were negative except for fatigue loss of balance and arthritic complaints she also complains of frequent urination but no nocturia and easy bruisability.     Social History     Tobacco Use   Smoking Status Former    Current packs/day: 0.00    Average packs/day: 2.0 packs/day for 6.0 years (12.0 ttl pk-yrs)    Types: Cigarettes    Start date: 3/15/1970    Quit date: 3/15/1976    Years since quittin.4   Smokeless Tobacco Never        Objective:     Physical Exam  Vitals and nursing note reviewed.   Constitutional:       Appearance: She is well-developed.   HENT:      Head: Normocephalic and atraumatic.      Right Ear: External ear normal.      Left Ear: External ear normal.   Eyes:      General: No scleral icterus.        Left eye: No discharge.      Conjunctiva/sclera: Conjunctivae normal.      Pupils: Pupils are equal, round, and reactive to light.   Neck:      Thyroid: No thyromegaly.   Cardiovascular:      Rate and Rhythm: Normal rate and regular rhythm.      Pulses: Intact distal pulses.           Carotid pulses are 2+ on the right side and 2+ on the left side.       Radial pulses are 2+ on the right side and 2+ on the left side.        Dorsalis pedis pulses are 2+ on the right side and 2+ on the left side.        Posterior tibial pulses are 2+ on the right side and 2+ on the left side.      Heart sounds: Normal heart sounds. No midsystolic click and no opening snap. No murmur heard.     No friction rub. No gallop. No S3 or S4 sounds.   Pulmonary:      Effort: Pulmonary effort is normal.      Breath sounds: Normal breath sounds.   Chest:      Comments: Device pocket is healing well without the significant ecchymosis nor her swelling nor tenderness.    Upon abducting and abducting the arm/shoulder, patient had  "significant pain and decreased range of motion with the pain localizing to the left scapula  Abdominal:      General: There is no distension.      Palpations: Abdomen is soft. There is no hepatomegaly.      Tenderness: There is no abdominal tenderness. There is no guarding.   Musculoskeletal:      Cervical back: Normal range of motion and neck supple.      Right lower leg: No swelling.      Left lower leg: No swelling.      Right ankle: No swelling.      Left ankle: No swelling.   Skin:     General: Skin is warm and dry.      Findings: No rash.      Nails: There is no clubbing.   Neurological:      Mental Status: She is alert and oriented to person, place, and time.      Cranial Nerves: No cranial nerve deficit.      Gait: Gait normal.   Psychiatric:         Speech: Speech normal.         Behavior: Behavior normal.         Thought Content: Thought content normal.       /70   Pulse 62   Ht 5' 4" (1.626 m)   Wt 60.1 kg (132 lb 7.9 oz)   SpO2 99%   BMI 22.74 kg/m²       Results for orders placed during the hospital encounter of 04/28/23    Echo    Interpretation Summary  · The left ventricle is normal in size with eccentric hypertrophy and mildly decreased systolic function.  · Severe left atrial enlargement.  · Grade III left ventricular diastolic dysfunction.  · The estimated PA systolic pressure is 32 mmHg.  · Normal right ventricular size with normal right ventricular systolic function.  · Intermediate central venous pressure (8 mmHg).  · The estimated ejection fraction is 40%.  · There is abnormal septal wall motion consistent with right ventricular pacemaker.  · Moderate mitral regurgitation.  · Mild tricuspid regurgitation.    WBC   Date Value Ref Range Status   06/23/2023 6.67 3.90 - 12.70 K/uL Final     POC Hematocrit   Date Value Ref Range Status   04/07/2017 35 (L) 36 - 54 %PCV Final     Hematocrit   Date Value Ref Range Status   06/23/2023 37.6 37.0 - 48.5 % Final     Hemoglobin   Date Value Ref " Range Status   06/23/2023 12.2 12.0 - 16.0 g/dL Final     Lab Results   Component Value Date     06/23/2023     Lab Results   Component Value Date    CREATININE 1.6 (H) 06/23/2023    EGFRNORACEVR 32.0 (A) 06/23/2023    K 4.6 06/23/2023     Lab Results   Component Value Date    BNP 1,438 (H) 06/26/2023            reports no history of alcohol use.  Past Medical History:   Diagnosis Date    Age-related osteoporosis without current pathological fracture 8/20/2018    Anemia     Anxiety     Arthritis     Atrial flutter     Cancer     lymphoma Large cell B    CHF (congestive heart failure)     Chronic anemia 4/26/2017    Chronic midline low back pain with right-sided sciatica 8/20/2018    Coronary artery disease     01/2015 LHC patent LCX. 50% stenosis in LAD and RCA.      Depression     Disorder of kidney and ureter     Encounter for blood transfusion     GERD (gastroesophageal reflux disease)     Gout, arthritis     Heart failure     Hx of psychiatric care     Hyperlipidemia     Hypertension     Hypothyroidism     Immune deficiency disorder     Kidney disease     Lung nodule 2014    RML--stable    Obesity     Pacemaker     Metronic    Paroxysmal atrial fibrillation 3/15/2018    Pneumonia     Polyneuropathy     chemo induced    Psychiatric problem     Rheumatoid arthritis involving multiple sites with positive rheumatoid factor 4/19/2023    Stroke 11/16/2020    Tobacco dependence     quit 1976    Trouble in sleeping     Unstable angina 11/27/2020     Past Surgical History:   Procedure Laterality Date    APPENDECTOMY  1966 approx    CARDIAC PACEMAKER PLACEMENT  01/22/2015    CHOLECYSTECTOMY  1993    incidental at time of gastric bypass    COLON SURGERY Right 2017    hemicolectomy    COLONOSCOPY N/A 4/6/2017    Procedure: COLONOSCOPY;  Surgeon: Tye Enamorado MD;  Location: [facility];  Service: Endoscopy;  Laterality: N/A;    COLONOSCOPY N/A 11/28/2018    Procedure: COLONOSCOPY;  Surgeon: Saúl Arthur III, MD;   "Location: [facility] ENDO;  Service: Endoscopy;  Laterality: N/A;    CORONARY ANGIOPLASTY  02/2014    CORONARY STENT PLACEMENT  02/05/2014    ESOPHAGOGASTRODUODENOSCOPY N/A 11/28/2018    Procedure: EGD (ESOPHAGOGASTRODUODENOSCOPY);  Surgeon: Saúl Arthur III, MD;  Location: [facility] ENDO;  Service: Endoscopy;  Laterality: N/A;    GASTRIC BYPASS  1993    with incidental choly    HERNIA REPAIR      IMPLANTATION OF BIVENTRICULAR PERMANENT PACEMAKER AS UPGRADE TO EXISTING PACEMAKER Left 7/13/2023    Procedure: Biventricular pacemaker upgrade/His lead vs CRT-P;  Surgeon: Velasquez Vizcaino MD;  Location: [facility] CATH LAB;  Service: Cardiology;  Laterality: Left;  Will need to upgrade her pacemaker.  Ideally his pacing lead would be the best approach. MDT/ Alternatively, an upgrade /His lead as Plan A  and  CRT if fails/notified MDT rep Mell and Arik  NOT MRI safe   Pacer and leads implanted    INJECTION OF ANESTHETIC AGENT INTO SACROILIAC JOINT Right 10/8/2020    Procedure: Right BLOCK, SACROILIAC JOINT with RN IV sedation;  Surgeon: Madi Anton MD;  Location: [facility];  Service: Pain Management;  Laterality: Right;    JOINT REPLACEMENT Bilateral 2009    3 months apart    TONSILLECTOMY  1959    VENOGRAM, CATH LAB Bilateral 6/29/2023    Procedure: Venogram, Cath Lab;  Surgeon: Velasquez Vizcaino MD;  Location: [facility] CATH LAB;  Service: Cardiology;  Laterality: Bilateral;  1:00PM arrival time  NOT MRI safe   Pacer and leads implanted by [facility]ian 1/22/15   MDTR SEDR01 [place]an, [id number]   A lead: SAMEER 5076 CapSure Fix Novus, [id number]   RV lead: SAMEER 5076 CapSure Fix Novus, [id number]     Family History   Problem Relation Age of Onset    Heart disease Mother     Hypertension Mother     Cataracts Mother     Stomach cancer Father         "ulcers that turned to cancer"    Cancer Father         stomach    Pancreatic cancer Sister     Cancer Sister         pancreatic    Leukemia Brother         "leukemia which led to " "intestinal cancer"    Cataracts Brother     Cancer Brother         leukemia then later stomach cancer    Drug abuse Son     Cancer Son         prostate cancer    Prostate cancer Son     COPD Daughter     Asthma Daughter     Hypertension Maternal Grandfather     Stroke Maternal Grandfather     Alcohol abuse Neg Hx     Diabetes Neg Hx     Mental retardation Neg Hx     Mental illness Neg Hx        Assessment:   L SCAPULAR DYSKINESIS -possibly related to lying on the x-ray table or perhaps due to the sling prescribed the postop.  1. Paroxysmal atrial fibrillation    2. Coronary artery disease : multiple vessels, s/p PTCA    3. Ischemic cardiomyopathy    4. Atherosclerosis of aorta    5. Essential hypertension    6. Mixed hyperlipidemia    7. Cardiac pacemaker in situ    8. Postural hypotension    9. Chronic systolic congestive heart failure    10. Chronic diastolic heart failure    11. Diffuse large B-cell lymphoma, unspecified body region    12. Macrocytic anemia    13. S/P gastric bypass    14. Hypothyroidism (acquired)    15. Hyperparathyroidism    16. NSAID long-term use        Plan:    CELEBREX 100 BID pc FOR 3 WEEKS along with Nexium 20 a day   Warm packs/local massage    No orders of the defined types were placed in this encounter.      Follow up in about 3 months (around 10/28/2023), or if symptoms worsen or fail to improve.    There are no discontinued medications.    Medications Ordered This Encounter   Medications    celecoxib (CELEBREX) 100 MG capsule     Sig: Take 1 capsule (100 mg total) by mouth 2 (two) times daily.     Dispense:  42 capsule     Refill:  0       Medication List with Changes/Refills   New Medications    CELECOXIB (CELEBREX) 100 MG CAPSULE    Take 1 capsule (100 mg total) by mouth 2 (two) times daily.   Current Medications    ALLOPURINOL (ZYLOPRIM) 300 MG TABLET    Take 1 tablet (300 mg total) by mouth once daily.    ASCORBIC ACID, VITAMIN C, (VITAMIN C) 100 MG TABLET    Take by mouth once " daily.    ASPIRIN (ECOTRIN) 81 MG EC TABLET    Take 81 mg by mouth nightly.     AZELASTINE (ASTELIN) 137 MCG (0.1 %) NASAL SPRAY    1 spray (137 mcg total) by Nasal route 2 (two) times daily.    BUSPIRONE (BUSPAR) 7.5 MG TABLET    Take 1 tablet (7.5 mg total) by mouth 2 (two) times a day.    CALCITRIOL (ROCALTROL) 0.25 MCG CAP    TAKE ONE CAPSULE BY MOUTH EVERY MONDAY, WEDNESDAY AND FRIDAY    CEFADROXIL (DURICEF) 500 MG CAP    Take 2 capsules by mouth tonight @ 8 pm then take 1 capssule by mouth every 12 hours until done.    CLOPIDOGREL (PLAVIX) 75 MG TABLET    TAKE ONE TABLET BY MOUTH EVERY DAY    DICLOFENAC SODIUM 1 % GEL    Apply 2 g topically once daily. Apply 2 g over painful joints once or twice a day.    DIPRIVAN 10 MG/ML INFUSION        DULOXETINE (CYMBALTA) 30 MG CAPSULE    TAKE ONE CAPSULE BY MOUTH EVERY DAY    FLUTICASONE PROPIONATE (FLONASE) 50 MCG/ACTUATION NASAL SPRAY    2 sprays (100 mcg total) by Each Nostril route once daily.    FUROSEMIDE (LASIX) 40 MG TABLET    Take 1 tablet (40 mg total) by mouth once daily. 1 every day, then 2 tablets every other day    HYDROCODONE-ACETAMINOPHEN (NORCO)  MG PER TABLET    Take 1 tablet by mouth every 6 (six) hours as needed for Pain. Please use sparingly. Start with 1/2 tablet per dose - it may be sufficient.    HYDROXYCHLOROQUINE (PLAQUENIL) 200 MG TABLET    Take 1 tablet (200 mg total) by mouth once daily.    IRON-VITAMIN C 100-250 MG, ICAR-C, 100-250 MG TAB    TAKE ONE TABLET BY MOUTH EVERY DAY    LEVOCETIRIZINE (XYZAL) 5 MG TABLET    Take 1 tablet (5 mg total) by mouth every evening.    LEVOTHYROXINE (SYNTHROID) 75 MCG TABLET    Take 1 tablet (75 mcg total) by mouth before breakfast.    METHYLPREDNISOLONE (MEDROL DOSEPACK) 4 MG TABLET    use as directed    METOPROLOL TARTRATE (LOPRESSOR) 25 MG TABLET    TAKE ONE TABLET BY MOUTH TWICE DAILY    MULTIVITAMIN (THERAGRAN) PER TABLET    Take 1 tablet by mouth once daily.    NITROGLYCERIN 0.2 MG/HR TD PT24  (NITRODUR) 0.2 MG/HR    Place 1 patch onto the skin once daily.    PRAVASTATIN (PRAVACHOL) 40 MG TABLET    TAKE ONE TABLET BY MOUTH ONCE DAILY    PREGABALIN (LYRICA) 50 MG CAPSULE    Take 50 mg by mouth 3 (three) times daily.    SERTRALINE (ZOLOFT) 100 MG TABLET    Take 1.5 tablets (150 mg total) by mouth once daily.    SODIUM BICARBONATE 650 MG TABLET    TAKE ONE TABLET BY MOUTH TWICE DAILY    VITAMIN D2 1,250 MCG (50,000 UNIT) CAPSULE    TAKE ONE CAPSULE BY MOUTH EVERY 7 DAYS    ZINC ACETATE ORAL    Take 250 mg by mouth once daily.    Changed and/or Refilled Medications    Modified Medication Previous Medication    ELIQUIS 2.5 MG TAB apixaban (ELIQUIS) 2.5 mg Tab       TAKE ONE TABLET BY MOUTH TWICE DAILY    Take 1 tablet (2.5 mg total) by mouth 2 (two) times daily. Hold Eliquis until Sunday -resume next dose Manfred morning        This note is at least partially dictated using the M*Modal Fluency Direct word recognition program. There are word recognition mistakes that are occasionally missed on review.

## 2023-07-31 RX ORDER — APIXABAN 2.5 MG/1
2.5 TABLET, FILM COATED ORAL 2 TIMES DAILY
Qty: 180 TABLET | Refills: 3 | Status: SHIPPED | OUTPATIENT
Start: 2023-07-31

## 2023-08-01 ENCOUNTER — TELEPHONE (OUTPATIENT)
Dept: NEPHROLOGY | Facility: CLINIC | Age: 83
End: 2023-08-01
Payer: MEDICARE

## 2023-08-15 RX ORDER — CALCITRIOL 0.25 UG/1
CAPSULE ORAL
Qty: 12 CAPSULE | Refills: 0 | OUTPATIENT
Start: 2023-08-15

## 2023-08-18 DIAGNOSIS — J30.9 ALLERGIC RHINITIS, UNSPECIFIED SEASONALITY, UNSPECIFIED TRIGGER: ICD-10-CM

## 2023-08-18 RX ORDER — LEVOCETIRIZINE DIHYDROCHLORIDE 5 MG/1
5 TABLET, FILM COATED ORAL NIGHTLY
Qty: 90 TABLET | Refills: 3 | Status: SHIPPED | OUTPATIENT
Start: 2023-08-18 | End: 2024-01-19

## 2023-08-18 NOTE — TELEPHONE ENCOUNTER
Refill Routing Note   Medication(s) are not appropriate for processing by [facility] for the following reason(s):      Patient not seen by provider within 15 months  Patient seen in ED/Hospital since LOV with provider    [facility] action(s):  Defer Care Due:  None identified            Appointments  past 12m or future 3m with PCP    Date Provider   Last Visit   11/29/2021 Marti Coronel MD   Next Visit   Visit date not found Marti Coronel MD   ED visits in past 90 days: 0        Note composed:12:13 PM 08/18/2023

## 2023-08-18 NOTE — TELEPHONE ENCOUNTER
No care due was identified.  [facility] Embedded Care Due Messages. Reference number: [id number].   8/18/2023 12:07:38 PM CDT

## 2023-08-23 RX ORDER — CALCITRIOL 0.25 UG/1
CAPSULE ORAL
Qty: 12 CAPSULE | Refills: 0 | Status: SHIPPED | OUTPATIENT
Start: 2023-08-23 | End: 2023-09-13

## 2023-09-13 RX ORDER — CALCITRIOL 0.25 UG/1
CAPSULE ORAL
Qty: 12 CAPSULE | Refills: 3 | Status: SHIPPED | OUTPATIENT
Start: 2023-09-13 | End: 2024-01-10

## 2023-09-15 DIAGNOSIS — I95.1 POSTURAL HYPOTENSION: ICD-10-CM

## 2023-09-15 RX ORDER — METOPROLOL TARTRATE 25 MG/1
TABLET, FILM COATED ORAL
Qty: 60 TABLET | Refills: 6 | Status: SHIPPED | OUTPATIENT
Start: 2023-09-15

## 2023-10-15 DIAGNOSIS — I25.10 CORONARY ARTERY DISEASE WITHOUT ANGINA PECTORIS, UNSPECIFIED VESSEL OR LESION TYPE, UNSPECIFIED WHETHER NATIVE OR TRANSPLANTED HEART: ICD-10-CM

## 2023-10-15 DIAGNOSIS — M06.4 UNDIFFERENTIATED INFLAMMATORY ARTHRITIS: ICD-10-CM

## 2023-10-16 RX ORDER — CLOPIDOGREL BISULFATE 75 MG/1
TABLET ORAL
Qty: 30 TABLET | Refills: 6 | Status: SHIPPED | OUTPATIENT
Start: 2023-10-16 | End: 2024-01-04 | Stop reason: ALTCHOICE

## 2023-10-16 RX ORDER — HYDROXYCHLOROQUINE SULFATE 200 MG/1
200 TABLET, FILM COATED ORAL DAILY
Qty: 90 TABLET | Refills: 3 | Status: SHIPPED | OUTPATIENT
Start: 2023-10-16 | End: 2023-10-24

## 2023-10-23 DIAGNOSIS — I48.3 TYPICAL ATRIAL FLUTTER: Primary | ICD-10-CM

## 2023-10-24 ENCOUNTER — PATIENT MESSAGE (OUTPATIENT)
Dept: CARDIOLOGY | Facility: CLINIC | Age: 83
End: 2023-10-24
Payer: MEDICARE

## 2023-10-24 ENCOUNTER — OFFICE VISIT (OUTPATIENT)
Dept: RHEUMATOLOGY | Facility: CLINIC | Age: 83
End: 2023-10-24
Payer: MEDICARE

## 2023-10-24 ENCOUNTER — INFUSION (OUTPATIENT)
Dept: INFUSION THERAPY | Facility: HOSPITAL | Age: 83
End: 2023-10-24
Attending: INTERNAL MEDICINE
Payer: MEDICARE

## 2023-10-24 VITALS
DIASTOLIC BLOOD PRESSURE: 71 MMHG | WEIGHT: 128.31 LBS | HEART RATE: 73 BPM | BODY MASS INDEX: 22.02 KG/M2 | SYSTOLIC BLOOD PRESSURE: 111 MMHG

## 2023-10-24 DIAGNOSIS — M1A.09X0 IDIOPATHIC CHRONIC GOUT, MULTIPLE SITES, WITHOUT TOPHUS (TOPHI): ICD-10-CM

## 2023-10-24 DIAGNOSIS — M81.0 AGE-RELATED OSTEOPOROSIS WITHOUT CURRENT PATHOLOGICAL FRACTURE: Primary | ICD-10-CM

## 2023-10-24 DIAGNOSIS — M06.4 UNDIFFERENTIATED INFLAMMATORY ARTHRITIS: ICD-10-CM

## 2023-10-24 DIAGNOSIS — M25.511 ARTHRALGIA OF RIGHT ACROMIOCLAVICULAR JOINT: ICD-10-CM

## 2023-10-24 PROCEDURE — 3078F DIAST BP <80 MM HG: CPT | Mod: HCNC,CPTII,S$GLB, | Performed by: INTERNAL MEDICINE

## 2023-10-24 PROCEDURE — 3288F PR FALLS RISK ASSESSMENT DOCUMENTED: ICD-10-PCS | Mod: HCNC,CPTII,S$GLB, | Performed by: INTERNAL MEDICINE

## 2023-10-24 PROCEDURE — 1101F PT FALLS ASSESS-DOCD LE1/YR: CPT | Mod: HCNC,CPTII,S$GLB, | Performed by: INTERNAL MEDICINE

## 2023-10-24 PROCEDURE — 99214 PR OFFICE/OUTPT VISIT, EST, LEVL IV, 30-39 MIN: ICD-10-PCS | Mod: 25,HCNC,S$GLB, | Performed by: INTERNAL MEDICINE

## 2023-10-24 PROCEDURE — 1157F PR ADVANCE CARE PLAN OR EQUIV PRESENT IN MEDICAL RECORD: ICD-10-PCS | Mod: HCNC,CPTII,S$GLB, | Performed by: INTERNAL MEDICINE

## 2023-10-24 PROCEDURE — 1160F PR REVIEW ALL MEDS BY PRESCRIBER/CLIN PHARMACIST DOCUMENTED: ICD-10-PCS | Mod: HCNC,CPTII,S$GLB, | Performed by: INTERNAL MEDICINE

## 2023-10-24 PROCEDURE — 1157F ADVNC CARE PLAN IN RCRD: CPT | Mod: HCNC,CPTII,S$GLB, | Performed by: INTERNAL MEDICINE

## 2023-10-24 PROCEDURE — 63600175 PHARM REV CODE 636 W HCPCS: Mod: JZ,JG,HCNC | Performed by: INTERNAL MEDICINE

## 2023-10-24 PROCEDURE — 20610 INTERMEDIATE JOINT ASPIRATION/INJECTION: R ACROMIOCLAVICULAR: ICD-10-PCS | Mod: HCNC,RT,S$GLB, | Performed by: INTERNAL MEDICINE

## 2023-10-24 PROCEDURE — 3288F FALL RISK ASSESSMENT DOCD: CPT | Mod: HCNC,CPTII,S$GLB, | Performed by: INTERNAL MEDICINE

## 2023-10-24 PROCEDURE — 3074F SYST BP LT 130 MM HG: CPT | Mod: HCNC,CPTII,S$GLB, | Performed by: INTERNAL MEDICINE

## 2023-10-24 PROCEDURE — 1126F PR PAIN SEVERITY QUANTIFIED, NO PAIN PRESENT: ICD-10-PCS | Mod: HCNC,CPTII,S$GLB, | Performed by: INTERNAL MEDICINE

## 2023-10-24 PROCEDURE — 96372 THER/PROPH/DIAG INJ SC/IM: CPT | Mod: HCNC

## 2023-10-24 PROCEDURE — 3074F PR MOST RECENT SYSTOLIC BLOOD PRESSURE < 130 MM HG: ICD-10-PCS | Mod: HCNC,CPTII,S$GLB, | Performed by: INTERNAL MEDICINE

## 2023-10-24 PROCEDURE — 3078F PR MOST RECENT DIASTOLIC BLOOD PRESSURE < 80 MM HG: ICD-10-PCS | Mod: HCNC,CPTII,S$GLB, | Performed by: INTERNAL MEDICINE

## 2023-10-24 PROCEDURE — 99999 PR PBB SHADOW E&M-EST. PATIENT-LVL V: ICD-10-PCS | Mod: PBBFAC,HCNC,, | Performed by: INTERNAL MEDICINE

## 2023-10-24 PROCEDURE — 1126F AMNT PAIN NOTED NONE PRSNT: CPT | Mod: HCNC,CPTII,S$GLB, | Performed by: INTERNAL MEDICINE

## 2023-10-24 PROCEDURE — 1160F RVW MEDS BY RX/DR IN RCRD: CPT | Mod: HCNC,CPTII,S$GLB, | Performed by: INTERNAL MEDICINE

## 2023-10-24 PROCEDURE — 99214 OFFICE O/P EST MOD 30 MIN: CPT | Mod: 25,HCNC,S$GLB, | Performed by: INTERNAL MEDICINE

## 2023-10-24 PROCEDURE — 1159F PR MEDICATION LIST DOCUMENTED IN MEDICAL RECORD: ICD-10-PCS | Mod: HCNC,CPTII,S$GLB, | Performed by: INTERNAL MEDICINE

## 2023-10-24 PROCEDURE — 99999 PR PBB SHADOW E&M-EST. PATIENT-LVL V: CPT | Mod: PBBFAC,HCNC,, | Performed by: INTERNAL MEDICINE

## 2023-10-24 PROCEDURE — 1159F MED LIST DOCD IN RCRD: CPT | Mod: HCNC,CPTII,S$GLB, | Performed by: INTERNAL MEDICINE

## 2023-10-24 PROCEDURE — 1101F PR PT FALLS ASSESS DOC 0-1 FALLS W/OUT INJ PAST YR: ICD-10-PCS | Mod: HCNC,CPTII,S$GLB, | Performed by: INTERNAL MEDICINE

## 2023-10-24 PROCEDURE — 20610 DRAIN/INJ JOINT/BURSA W/O US: CPT | Mod: HCNC,RT,S$GLB, | Performed by: INTERNAL MEDICINE

## 2023-10-24 RX ORDER — TRIAMCINOLONE ACETONIDE 40 MG/ML
40 INJECTION, SUSPENSION INTRA-ARTICULAR; INTRAMUSCULAR
Status: DISCONTINUED | OUTPATIENT
Start: 2023-10-24 | End: 2023-10-24 | Stop reason: HOSPADM

## 2023-10-24 RX ORDER — HYDROXYCHLOROQUINE SULFATE 200 MG/1
200 TABLET, FILM COATED ORAL EVERY OTHER DAY
Qty: 45 TABLET | Refills: 1 | Status: SHIPPED | OUTPATIENT
Start: 2023-10-24

## 2023-10-24 RX ADMIN — DENOSUMAB 60 MG: 60 INJECTION SUBCUTANEOUS at 11:10

## 2023-10-24 RX ADMIN — TRIAMCINOLONE ACETONIDE 40 MG: 40 INJECTION, SUSPENSION INTRA-ARTICULAR; INTRAMUSCULAR at 11:10

## 2023-10-24 NOTE — PROGRESS NOTES
RHEUMATOLOGY CLINIC FOLLOW UP VISIT  Chief complaints, HPI, ROS, EXAM, Assessment & Plans:-  Violet Zhao a 83 y.o. pleasant female comes in for follow-up visit.  She follows in the Rheumatology Clinic for osteoporosis and gout.   She denies any significant pain over small joints of bilateral hands since starting Plaquenil.  Complains of worsening right shoulder pain.  No trauma or injury.  Rheumatological review of system negative otherwise.  Physical examination shows mild effusion and tenderness of right acromioclavicular joint.  Tenderness but no effusion of right shoulder joint.  No synovitis of small joints..    1. Age-related osteoporosis without current pathological fracture    2. Idiopathic chronic gout, multiple sites, without tophus (tophi)    3. Arthralgia of right acromioclavicular joint    4. Undifferentiated inflammatory arthritis      Problem List Items Addressed This Visit       Idiopathic chronic gout, multiple sites, without tophus (tophi)    Age-related osteoporosis without current pathological fracture - Primary    Arthralgia of right acromioclavicular joint    Undifferentiated inflammatory arthritis    Relevant Medications    hydroxychloroquine (PLAQUENIL) 200 mg tablet       Labs reviewed today:-   Latest Reference Range & Units 10/24/23 10:22   Sodium 136 - 145 mmol/L 142   Potassium 3.5 - 5.1 mmol/L 4.6   Chloride 95 - 110 mmol/L 112 (H)   CO2 23 - 29 mmol/L 18 (L)   Anion Gap 8 - 16 mmol/L 12   BUN 8 - 23 mg/dL 39 (H)   Creatinine 0.5 - 1.4 mg/dL 1.9 (H)   eGFR >60 mL/min/1.73 m^2 26 !   Glucose 70 - 110 mg/dL 94   Calcium 8.7 - 10.5 mg/dL 9.1   ALP 55 - 135 U/L 47 (L)   PROTEIN TOTAL 6.0 - 8.4 g/dL 6.8   Albumin 3.5 - 5.2 g/dL 3.7   BILIRUBIN TOTAL 0.1 - 1.0 mg/dL 0.3   AST 10 - 40 U/L 29   ALT 10 - 44 U/L 33   (H): Data is abnormally high  (L): Data is abnormally low  !: Data is abnormal    Severe osteoporosis on Prolia.   High risk for hypocalcemia.  Prolia today and repeat CMP in 10 days.  Low-grade seropositive rheumatoid on Plaquenil-renally dosed at once daily.  Reduce further to every other day.   Effusion and tenderness of right acromioclavicular joint likely secondary to osteoarthritis.  Inject Kenalog today.  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  Monitor.  Yearly Plaquenil retinal clearance by ophthalmologist advised.  I have explained all of the above in detail and the patient understands all of the current recommendation(s). I have answered all questions to the best of my ability and to their complete satisfaction.       Intermediate Joint Aspiration/Injection: R acromioclavicular    Date/Time: 10/24/2023 11:15 AM    Performed by: Dilshad Rogers MD  Authorized by: Dislhad Rogers MD    Consent Done?:  Yes (Verbal)  Indications:  Arthritis, joint swelling and pain  Site marked: The procedure site was marked      Location:  Shoulder  Site:  R acromioclavicular  Prep: Patient was prepped and draped in usual sterile fashion    Ultrasonic Guidance for needle placement: No  Needle size:  25 G  Approach:  Superior  Medications:  40 mg triamcinolone acetonide 40 mg/mL  Patient tolerance:  Patient tolerated the procedure well with no immediate complications        # Follow up in about 6 months (around 4/24/2024).      Disclaimer: This note was prepared using voice recognition system and is likely to have sound alike errors and is not proof read.  Please call me with any questions.

## 2023-10-24 NOTE — PATIENT INSTRUCTIONS
Take Arthritis strength extended release Tylenol 650 mg - 1 tablet 3 times daily as needed for pain.  Start turmeric supplements- 1000 mg 2 times daily for anti-inflammatory benefit.  Start Osteo Bi-Flex triple strength - 1 capsule 2 times daily for joint protection.

## 2023-10-24 NOTE — NURSING
Prolia 60 mg q 6 months  Last dose given-4/19/23    Any invasive dental procedures in past 3 months or upcoming 3 months: denies    Last Rheumatology provider visit- Seen by Dr. PANIAGUA on 10/24/23    Recent labs? 10/24/23;  CKD pt needing repeat labs in 10 days-None scheduled.    Lab Results   Component Value Date    CALCIUM 9.1 10/24/2023     Lab Results   Component Value Date    CREATININE 1.9 (H) 10/24/2023     Lab Results   Component Value Date    ESTGFRAFRICA 35 (A) 05/31/2022     Lab Results   Component Value Date    EGFRNONAA 30 (A) 05/31/2022     Lab Results   Component Value Date    XODBYNSR43QV 30 11/15/2021          Lot #- [id number]  Expiration Date- 02/28/26      Prolia 60 mg/ml administered SQ to Left upper, posterior arm. Tolerated without any complaints. No redness, swelling, or drainage noted to site. Instructed to remain in clinic 15 minutes after administration to monitor for any s/sx of reaction. Pt instructed on signs and symptoms of reaction to report. Verbalizes understanding.

## 2023-10-27 ENCOUNTER — HOSPITAL ENCOUNTER (OUTPATIENT)
Dept: RADIOLOGY | Facility: HOSPITAL | Age: 83
Discharge: HOME | End: 2023-10-27
Attending: INTERNAL MEDICINE
Payer: MEDICARE

## 2023-10-27 ENCOUNTER — OFFICE VISIT (OUTPATIENT)
Dept: CARDIOLOGY | Facility: CLINIC | Age: 83
End: 2023-10-27
Payer: MEDICARE

## 2023-10-27 ENCOUNTER — CLINICAL SUPPORT (OUTPATIENT)
Dept: CARDIOLOGY | Facility: HOSPITAL | Age: 83
End: 2023-10-27
Attending: INTERNAL MEDICINE
Payer: MEDICARE

## 2023-10-27 ENCOUNTER — HOSPITAL ENCOUNTER (OUTPATIENT)
Dept: CARDIOLOGY | Facility: HOSPITAL | Age: 83
Discharge: HOME | End: 2023-10-27
Attending: INTERNAL MEDICINE
Payer: MEDICARE

## 2023-10-27 VITALS
WEIGHT: 129.88 LBS | HEART RATE: 76 BPM | SYSTOLIC BLOOD PRESSURE: 130 MMHG | DIASTOLIC BLOOD PRESSURE: 80 MMHG | BODY MASS INDEX: 22.29 KG/M2

## 2023-10-27 VITALS — WEIGHT: 129.88 LBS | BODY MASS INDEX: 22.29 KG/M2

## 2023-10-27 DIAGNOSIS — C83.30 DIFFUSE LARGE B-CELL LYMPHOMA, UNSPECIFIED BODY REGION: ICD-10-CM

## 2023-10-27 DIAGNOSIS — I25.10 CORONARY ARTERY DISEASE INVOLVING NATIVE CORONARY ARTERY OF NATIVE HEART WITHOUT ANGINA PECTORIS: Chronic | ICD-10-CM

## 2023-10-27 DIAGNOSIS — Z95.0 CARDIAC PACEMAKER IN SITU: ICD-10-CM

## 2023-10-27 DIAGNOSIS — I50.22 CHRONIC SYSTOLIC CONGESTIVE HEART FAILURE: ICD-10-CM

## 2023-10-27 DIAGNOSIS — D53.9 MACROCYTIC ANEMIA: ICD-10-CM

## 2023-10-27 DIAGNOSIS — I25.5 ISCHEMIC CARDIOMYOPATHY: Primary | ICD-10-CM

## 2023-10-27 DIAGNOSIS — M05.79 RHEUMATOID ARTHRITIS INVOLVING MULTIPLE SITES WITH POSITIVE RHEUMATOID FACTOR: ICD-10-CM

## 2023-10-27 DIAGNOSIS — Z95.0 BIVENTRICULAR CARDIAC PACEMAKER IN SITU: ICD-10-CM

## 2023-10-27 DIAGNOSIS — I50.32 CHRONIC DIASTOLIC HEART FAILURE: ICD-10-CM

## 2023-10-27 DIAGNOSIS — I48.3 TYPICAL ATRIAL FLUTTER: ICD-10-CM

## 2023-10-27 DIAGNOSIS — Z96.653 S/P TKR (TOTAL KNEE REPLACEMENT), BILATERAL: Chronic | ICD-10-CM

## 2023-10-27 DIAGNOSIS — I48.0 PAROXYSMAL ATRIAL FIBRILLATION: ICD-10-CM

## 2023-10-27 DIAGNOSIS — I25.5 ISCHEMIC CARDIOMYOPATHY: Chronic | ICD-10-CM

## 2023-10-27 DIAGNOSIS — Z98.84 S/P GASTRIC BYPASS: Chronic | ICD-10-CM

## 2023-10-27 DIAGNOSIS — I95.1 POSTURAL HYPOTENSION: Primary | ICD-10-CM

## 2023-10-27 DIAGNOSIS — Z79.1 NSAID LONG-TERM USE: ICD-10-CM

## 2023-10-27 DIAGNOSIS — E21.3 HYPERPARATHYROIDISM: ICD-10-CM

## 2023-10-27 PROCEDURE — 1160F PR REVIEW ALL MEDS BY PRESCRIBER/CLIN PHARMACIST DOCUMENTED: ICD-10-PCS | Mod: HCNC,CPTII,S$GLB, | Performed by: INTERNAL MEDICINE

## 2023-10-27 PROCEDURE — 1157F ADVNC CARE PLAN IN RCRD: CPT | Mod: HCNC,CPTII,S$GLB, | Performed by: INTERNAL MEDICINE

## 2023-10-27 PROCEDURE — 99215 PR OFFICE/OUTPT VISIT, EST, LEVL V, 40-54 MIN: ICD-10-PCS | Mod: HCNC,S$GLB,, | Performed by: INTERNAL MEDICINE

## 2023-10-27 PROCEDURE — 1125F AMNT PAIN NOTED PAIN PRSNT: CPT | Mod: HCNC,CPTII,S$GLB, | Performed by: INTERNAL MEDICINE

## 2023-10-27 PROCEDURE — 93005 ELECTROCARDIOGRAM TRACING: CPT | Mod: HCNC

## 2023-10-27 PROCEDURE — 3079F PR MOST RECENT DIASTOLIC BLOOD PRESSURE 80-89 MM HG: ICD-10-PCS | Mod: HCNC,CPTII,S$GLB, | Performed by: INTERNAL MEDICINE

## 2023-10-27 PROCEDURE — 93010 ELECTROCARDIOGRAM REPORT: CPT | Mod: HCNC,,, | Performed by: INTERNAL MEDICINE

## 2023-10-27 PROCEDURE — 1125F PR PAIN SEVERITY QUANTIFIED, PAIN PRESENT: ICD-10-PCS | Mod: HCNC,CPTII,S$GLB, | Performed by: INTERNAL MEDICINE

## 2023-10-27 PROCEDURE — 3288F PR FALLS RISK ASSESSMENT DOCUMENTED: ICD-10-PCS | Mod: HCNC,CPTII,S$GLB, | Performed by: INTERNAL MEDICINE

## 2023-10-27 PROCEDURE — 1100F PR PT FALLS ASSESS DOC 2+ FALLS/FALL W/INJURY/YR: ICD-10-PCS | Mod: HCNC,CPTII,S$GLB, | Performed by: INTERNAL MEDICINE

## 2023-10-27 PROCEDURE — 99999 PR PBB SHADOW E&M-EST. PATIENT-LVL I: CPT | Mod: PBBFAC,HCNC,,

## 2023-10-27 PROCEDURE — 93281 CARDIAC DEVICE CHECK - IN CLINIC & HOSPITAL: ICD-10-PCS | Mod: 26,HCNC,, | Performed by: INTERNAL MEDICINE

## 2023-10-27 PROCEDURE — 93281 PM DEVICE PROGR EVAL MULTI: CPT | Mod: 26,HCNC,, | Performed by: INTERNAL MEDICINE

## 2023-10-27 PROCEDURE — 99999 PR PBB SHADOW E&M-EST. PATIENT-LVL V: CPT | Mod: PBBFAC,HCNC,, | Performed by: INTERNAL MEDICINE

## 2023-10-27 PROCEDURE — 71046 X-RAY EXAM CHEST 2 VIEWS: CPT | Mod: 26,HCNC,, | Performed by: RADIOLOGY

## 2023-10-27 PROCEDURE — 99999 PR PBB SHADOW E&M-EST. PATIENT-LVL V: ICD-10-PCS | Mod: PBBFAC,HCNC,, | Performed by: INTERNAL MEDICINE

## 2023-10-27 PROCEDURE — 3075F SYST BP GE 130 - 139MM HG: CPT | Mod: HCNC,CPTII,S$GLB, | Performed by: INTERNAL MEDICINE

## 2023-10-27 PROCEDURE — 71046 XR CHEST PA AND LATERAL: ICD-10-PCS | Mod: 26,HCNC,, | Performed by: RADIOLOGY

## 2023-10-27 PROCEDURE — 93010 EKG 12-LEAD: ICD-10-PCS | Mod: HCNC,,, | Performed by: INTERNAL MEDICINE

## 2023-10-27 PROCEDURE — 3079F DIAST BP 80-89 MM HG: CPT | Mod: HCNC,CPTII,S$GLB, | Performed by: INTERNAL MEDICINE

## 2023-10-27 PROCEDURE — 1160F RVW MEDS BY RX/DR IN RCRD: CPT | Mod: HCNC,CPTII,S$GLB, | Performed by: INTERNAL MEDICINE

## 2023-10-27 PROCEDURE — 1159F MED LIST DOCD IN RCRD: CPT | Mod: HCNC,CPTII,S$GLB, | Performed by: INTERNAL MEDICINE

## 2023-10-27 PROCEDURE — 99999 PR PBB SHADOW E&M-EST. PATIENT-LVL I: ICD-10-PCS | Mod: PBBFAC,HCNC,,

## 2023-10-27 PROCEDURE — 1157F PR ADVANCE CARE PLAN OR EQUIV PRESENT IN MEDICAL RECORD: ICD-10-PCS | Mod: HCNC,CPTII,S$GLB, | Performed by: INTERNAL MEDICINE

## 2023-10-27 PROCEDURE — 1159F PR MEDICATION LIST DOCUMENTED IN MEDICAL RECORD: ICD-10-PCS | Mod: HCNC,CPTII,S$GLB, | Performed by: INTERNAL MEDICINE

## 2023-10-27 PROCEDURE — 71046 X-RAY EXAM CHEST 2 VIEWS: CPT | Mod: TC,HCNC

## 2023-10-27 PROCEDURE — 3075F PR MOST RECENT SYSTOLIC BLOOD PRESS GE 130-139MM HG: ICD-10-PCS | Mod: HCNC,CPTII,S$GLB, | Performed by: INTERNAL MEDICINE

## 2023-10-27 PROCEDURE — 99215 OFFICE O/P EST HI 40 MIN: CPT | Mod: HCNC,S$GLB,, | Performed by: INTERNAL MEDICINE

## 2023-10-27 PROCEDURE — 93281 PM DEVICE PROGR EVAL MULTI: CPT | Mod: HCNC

## 2023-10-27 PROCEDURE — 3288F FALL RISK ASSESSMENT DOCD: CPT | Mod: HCNC,CPTII,S$GLB, | Performed by: INTERNAL MEDICINE

## 2023-10-27 PROCEDURE — 1100F PTFALLS ASSESS-DOCD GE2>/YR: CPT | Mod: HCNC,CPTII,S$GLB, | Performed by: INTERNAL MEDICINE

## 2023-10-27 RX ORDER — GLUCOSAMINE/D3/BOSWELLIA SERRA 1500MG-400
TABLET ORAL
COMMUNITY

## 2023-10-27 RX ORDER — DEXTROMETHORPHAN HYDROBROMIDE, GUAIFENESIN 5; 100 MG/5ML; MG/5ML
650 LIQUID ORAL EVERY 8 HOURS
COMMUNITY

## 2023-10-27 NOTE — PROGRESS NOTES
"  Subjective:   Patient ID:  Violet Swenson is a 83 y.o. female     Chief complaint:AF/PPM    HPI  New patient to me. (06/28/2023 )  Referred by Dr Gil for evaluation and management of CHF/AF/PPM   --   Background as gleaned from patient's records and today's interview :  84 yo female with a h/o anemia, anxiety, atrial flutter, lymphoma large cell B, CHF, CAD, depression, GERD, gout, heart failure, HLD, HTN, hypothyroidism kidney disease, lung nodule, obesity, polyneuropathy.  Also CHB with MDT DC implant in 2015  AF noted as of Aug 2021     A year ago had stroke with right arm weakness, slurred speech, and gait instability.    Has had chronic chest pain. Cardiolite was negative.       Admitted in Nov 2021 for AF/CHF/MR >> diuresed , DCCV 11/11/21 - back in AF by 12/1/21     Recent CHF Sx exacerbation with leg swelling etc  >> Lasix dose was doubled.      Seen on 6/22/2023 by Dr Gil who noted:   Has still some residual exertional shortness her weight has been stable her cardiolite is negative she is in afib since 10/22 she is being followed by ep soon. She is compliant with salt intake.      Other relevant data:  EF has been slowly declining over the years - now at 40% despite mod MR  She is 100 % RV paced  Underlying rhythm is AF with AMITA at 33 bpm and RBBB  She continues to be quite short of breath.        Update 08/13/2023 :  She had an upgrade of her pacemaker with the addition of a his lead on 07/13/2023.  All went well.  She is however now complaining of severe pain in the "area of the pacemaker but the not anteriorly posteriorly shooting to the back.  This is temporarily relieved by Norco.  Otherwise she feels well  >>   L scapular dyskinesis >> 3 weeks of celebrex    Update 10/27/2023 :  Continues to have arthritic complaints.  No cardiovascular complaints at this time.  Feels more energetic since pacemaker was implanted.  Still has to mind her sleep schedule whenever she plans to an active " day.    I have reviewed the actual image of the ECG tracing obtained today and it shows AF with complete heart block and his pacing with minimal local nonspecific capture, an apparent HV of 40 milliseconds and clear-cut conduction across the his Purkinje system.    Patient's device (Tri PPM with His lead )was fully evaluated today under my direct supervision and real-time feedback.  Summary of findings are as listed below:  Device is in good repair.   The battery is not near JESSICA.  The his sensing and pacing thresholds are favorable with well maintained safety margins.   In persistent AFib with complete heart block.  There were no device data indicating possible ongoing fluid retention.    Recommendation:  Device follow up as per clinic routine with remote and in house checks      Current Outpatient Medications   Medication Sig    acetaminophen (TYLENOL ARTHRITIS PAIN) 650 MG TbSR Take 650 mg by mouth every 8 (eight) hours.    allopurinoL (ZYLOPRIM) 300 MG tablet Take 1 tablet (300 mg total) by mouth once daily.    ascorbic acid, vitamin C, (VITAMIN C) 100 MG tablet Take by mouth once daily.    aspirin (ECOTRIN) 81 MG EC tablet Take 81 mg by mouth nightly.     azelastine (ASTELIN) 137 mcg (0.1 %) nasal spray 1 spray (137 mcg total) by Nasal route 2 (two) times daily.    busPIRone (BUSPAR) 7.5 MG tablet Take 1 tablet (7.5 mg total) by mouth 2 (two) times a day.    calcitRIOL (ROCALTROL) 0.25 MCG Cap TAKE ONE CAPSULE BY MOUTH EVERY MONDAY, WEDNESDAY AND FRIDAY    celecoxib (CELEBREX) 100 MG capsule Take 1 capsule (100 mg total) by mouth 2 (two) times daily.    clopidogreL (PLAVIX) 75 mg tablet TAKE ONE TABLET BY MOUTH EVERY DAY    diclofenac sodium 1 % Gel Apply 2 g topically once daily. Apply 2 g over painful joints once or twice a day.    DIPRIVAN 10 mg/mL infusion     DULoxetine (CYMBALTA) 30 MG capsule TAKE ONE CAPSULE BY MOUTH EVERY DAY    ELIQUIS 2.5 mg Tab TAKE ONE TABLET BY MOUTH TWICE DAILY    fluticasone  propionate (FLONASE) 50 mcg/actuation nasal spray 2 sprays (100 mcg total) by Each Nostril route once daily.    furosemide (LASIX) 40 MG tablet Take 1 tablet (40 mg total) by mouth once daily. 1 every day, then 2 tablets every other day    glucosamine-D3-Boswellia serr (OSTEO BI-FLEX, 5-LOXIN,) 1,500-400-100 mg-unit-mg Tab Take by mouth.    hydroxychloroquine (PLAQUENIL) 200 mg tablet Take 1 tablet (200 mg total) by mouth every other day.    levothyroxine (SYNTHROID) 75 MCG tablet Take 1 tablet (75 mcg total) by mouth before breakfast.    metoprolol tartrate (LOPRESSOR) 25 MG tablet TAKE ONE TABLET BY MOUTH TWICE DAILY    multivitamin (THERAGRAN) per tablet Take 1 tablet by mouth once daily.    nitroGLYCERIN 0.2 mg/hr TD PT24 (NITRODUR) 0.2 mg/hr Place 1 patch onto the skin once daily.    pravastatin (PRAVACHOL) 40 MG tablet TAKE ONE TABLET BY MOUTH ONCE DAILY    pregabalin (LYRICA) 50 MG capsule Take 50 mg by mouth 3 (three) times daily.    sertraline (ZOLOFT) 100 MG tablet Take 1.5 tablets (150 mg total) by mouth once daily.    sodium bicarbonate 650 MG tablet TAKE ONE TABLET BY MOUTH TWICE DAILY    tumeric-ging-olive-oreg-capryl 100 mg-150 mg- 50 mg-150 mg Cap Take by mouth.    ZINC ACETATE ORAL Take 250 mg by mouth once daily.     cefadroxil (DURICEF) 500 MG Cap Take 2 capsules by mouth tonight @ 8 pm then take 1 capssule by mouth every 12 hours until done. (Patient not taking: Reported on 10/27/2023)    HYDROcodone-acetaminophen (NORCO)  mg per tablet Take 1 tablet by mouth every 6 (six) hours as needed for Pain. Please use sparingly. Start with 1/2 tablet per dose - it may be sufficient. (Patient not taking: Reported on 10/27/2023)    iron-vitamin C 100-250 mg, ICAR-C, 100-250 mg Tab TAKE ONE TABLET BY MOUTH EVERY DAY (Patient not taking: Reported on 10/27/2023)    levocetirizine (XYZAL) 5 MG tablet Take 1 tablet (5 mg total) by mouth every evening. (Patient not taking: Reported on 10/27/2023)     methylPREDNISolone (MEDROL DOSEPACK) 4 mg tablet use as directed (Patient not taking: Reported on 10/27/2023)    VITAMIN D2 1,250 mcg (50,000 unit) capsule TAKE ONE CAPSULE BY MOUTH EVERY 7 DAYS (Patient not taking: Reported on 10/27/2023)     Current Facility-Administered Medications   Medication    denosumab (PROLIA) injection 60 mg     Review of Systems     Constitutional: Reviewed  for decreased appetite, weight gain and weight loss.   HENT: Reviewed for nosebleeds.    Eyes:  Reviewed for blurred vision and visual disturbance.   Cardiovascular: Reviewed for chest pain, claudication, cyanosis,dyspnea on exertion, leg swelling, orthopnea,paroxysmal nocturnal dyspnearregular heartbeats, palpitations, near-syncope, and syncope.   Respiratory: Reviewed for cough, shortness of breath, wheezing, sleep disturbances due to breathing and snoring, .    Endocrine: Reviewed for heat intolerance.   Hematologic/Lymphatic: Reviewed for easy bruisability/bleeding.   Skin: Reviewed for rash.   Musculoskeletal: Reviewed for muscle weakness and myalgias.   Gastrointestinal: Reviewed for abdominal pain, anorexia, melena, nausea and vomiting.   Genitourinary: Reviewed for menorrhagia, frequency, nocturia and incontinence.   Neurological: Reviewed for excessive daytime sleepiness, dizziness, vertigo, weakness, headaches, loss of balance and seizures,   Psychiatric/Behavioral:  Reviewed for insomnia, altered mental status, depression, anxiety and nervousness.       All symptoms reviewed above were negative except for abdominal pains, easy bruisability, lightheadedness, loss of balance, arthritic complaints, anxiety and depression       Social History     Tobacco Use   Smoking Status Former    Current packs/day: 0.00    Average packs/day: 2.0 packs/day for 6.0 years (12.0 ttl pk-yrs)    Types: Cigarettes    Start date: 3/15/1970    Quit date: 3/15/1976    Years since quittin.6   Smokeless Tobacco Never        Objective:      Physical Exam  Vitals and nursing note reviewed.   Constitutional:       Appearance: She is well-developed.   HENT:      Head: Normocephalic and atraumatic.      Right Ear: External ear normal.      Left Ear: External ear normal.   Eyes:      General: No scleral icterus.        Left eye: No discharge.      Conjunctiva/sclera: Conjunctivae normal.      Pupils: Pupils are equal, round, and reactive to light.   Neck:      Thyroid: No thyromegaly.   Cardiovascular:      Rate and Rhythm: Normal rate and regular rhythm.      Pulses: Intact distal pulses.           Carotid pulses are 2+ on the right side and 2+ on the left side.       Radial pulses are 2+ on the right side and 2+ on the left side.        Dorsalis pedis pulses are 2+ on the right side and 2+ on the left side.        Posterior tibial pulses are 2+ on the right side and 2+ on the left side.      Heart sounds: Normal heart sounds. No midsystolic click and no opening snap. No murmur heard.     No friction rub. No gallop. No S3 or S4 sounds.   Pulmonary:      Effort: Pulmonary effort is normal.      Breath sounds: Normal breath sounds.   Chest:      Comments: Device pocket is in great repair.  Abdominal:      General: There is no distension.      Palpations: Abdomen is soft. There is no hepatomegaly.      Tenderness: There is no abdominal tenderness. There is no guarding.   Musculoskeletal:      Cervical back: Normal range of motion and neck supple.      Right lower leg: No swelling.      Left lower leg: No swelling.      Right ankle: No swelling.      Left ankle: No swelling.   Skin:     General: Skin is warm and dry.      Findings: No rash.      Nails: There is no clubbing.   Neurological:      Mental Status: She is alert and oriented to person, place, and time.      Cranial Nerves: No cranial nerve deficit.      Gait: Gait normal.   Psychiatric:         Speech: Speech normal.         Behavior: Behavior normal.         Thought Content: Thought content  normal.       /80   Pulse 76   Wt 58.9 kg (129 lb 13.6 oz)   BMI 22.29 kg/m²       Results for orders placed during the hospital encounter of 04/28/23    Echo    Interpretation Summary  · The left ventricle is normal in size with eccentric hypertrophy and mildly decreased systolic function.  · Severe left atrial enlargement.  · Grade III left ventricular diastolic dysfunction.  · The estimated PA systolic pressure is 32 mmHg.  · Normal right ventricular size with normal right ventricular systolic function.  · Intermediate central venous pressure (8 mmHg).  · The estimated ejection fraction is 40%.  · There is abnormal septal wall motion consistent with right ventricular pacemaker.  · Moderate mitral regurgitation.  · Mild tricuspid regurgitation.    WBC   Date Value Ref Range Status   06/23/2023 6.67 3.90 - 12.70 K/uL Final     POC Hematocrit   Date Value Ref Range Status   04/07/2017 35 (L) 36 - 54 %PCV Final     Hematocrit   Date Value Ref Range Status   06/23/2023 37.6 37.0 - 48.5 % Final     Hemoglobin   Date Value Ref Range Status   06/23/2023 12.2 12.0 - 16.0 g/dL Final     Lab Results   Component Value Date     06/23/2023     Lab Results   Component Value Date    CREATININE 1.9 (H) 10/24/2023    EGFRNORACEVR 26 (A) 10/24/2023    K 4.6 10/24/2023     Lab Results   Component Value Date    BNP 1,438 (H) 06/26/2023            reports no history of alcohol use.  Past Medical History:   Diagnosis Date    Age-related osteoporosis without current pathological fracture 8/20/2018    Anemia     Anxiety     Arthritis     Atrial flutter     Cancer     lymphoma Large cell B    CHF (congestive heart failure)     Chronic anemia 4/26/2017    Chronic midline low back pain with right-sided sciatica 8/20/2018    Coronary artery disease     01/2015 [facility] patent LCX. 50% stenosis in LAD and RCA.      Depression     Disorder of kidney and ureter     Encounter for blood transfusion     GERD (gastroesophageal reflux  disease)     Gout, arthritis     Heart failure     Hx of psychiatric care     Hyperlipidemia     Hypertension     Hypothyroidism     Immune deficiency disorder     Kidney disease     Lung nodule 2014    RML--stable    Obesity     Pacemaker     Metronic    Paroxysmal atrial fibrillation 3/15/2018    Pneumonia     Polyneuropathy     chemo induced    Psychiatric problem     Rheumatoid arthritis involving multiple sites with positive rheumatoid factor 4/19/2023    Stroke 11/16/2020    Tobacco dependence     quit 1976    Trouble in sleeping     Unstable angina 11/27/2020     Past Surgical History:   Procedure Laterality Date    APPENDECTOMY  1966 approx    CARDIAC PACEMAKER PLACEMENT  01/22/2015    CHOLECYSTECTOMY  1993    incidental at time of gastric bypass    COLON SURGERY Right 2017    hemicolectomy    COLONOSCOPY N/A 4/6/2017    Procedure: COLONOSCOPY;  Surgeon: Tye Enamorado MD;  Location: [facility];  Service: Endoscopy;  Laterality: N/A;    COLONOSCOPY N/A 11/28/2018    Procedure: COLONOSCOPY;  Surgeon: Saúl Arthur III, MD;  Location: [facility];  Service: Endoscopy;  Laterality: N/A;    CORONARY ANGIOPLASTY  02/2014    CORONARY STENT PLACEMENT  02/05/2014    ESOPHAGOGASTRODUODENOSCOPY N/A 11/28/2018    Procedure: EGD (ESOPHAGOGASTRODUODENOSCOPY);  Surgeon: Saúl Arthur III, MD;  Location: [facility];  Service: Endoscopy;  Laterality: N/A;    GASTRIC BYPASS  1993    with incidental choly    HERNIA REPAIR      IMPLANTATION OF BIVENTRICULAR PERMANENT PACEMAKER AS UPGRADE TO EXISTING PACEMAKER Left 7/13/2023    Procedure: Biventricular pacemaker upgrade/His lead vs CRT-P;  Surgeon: Velasquez Vizcaino MD;  Location: [facility] CATH LAB;  Service: Cardiology;  Laterality: Left;  Will need to upgrade her pacemaker.  Ideally his pacing lead would be the best approach. MDT/ Alternatively, an upgrade /His lead as Plan A  and  CRT if fails/notified MDT rep Arabella  NOT MRI safe   Pacer and leads implanted     "INJECTION OF ANESTHETIC AGENT INTO SACROILIAC JOINT Right 10/8/2020    Procedure: Right BLOCK, SACROILIAC JOINT with RN IV sedation;  Surgeon: Madi Anton MD;  Location: [facility];  Service: Pain Management;  Laterality: Right;    JOINT REPLACEMENT Bilateral 2009    3 months apart    TONSILLECTOMY  1959    VENOGRAM, CATH LAB Bilateral 6/29/2023    Procedure: Venogram, Cath Lab;  Surgeon: Velasquez Vizcaino MD;  Location: [facility] CATH LAB;  Service: Cardiology;  Laterality: Bilateral;  1:00PM arrival time  NOT MRI safe   Pacer and leads implanted by [facility], 1/22/15   MDTR [id number] Sensia, [id number]   A lead: SAMEER 5076 CapSure Fix Novus, [id number]   RV lead: SAMEER 5076 CapSure Fix Novus, [id number]     Family History   Problem Relation Age of Onset    Heart disease Mother     Hypertension Mother     Cataracts Mother     Stomach cancer Father         "ulcers that turned to cancer"    Cancer Father         stomach    Pancreatic cancer Sister     Cancer Sister         pancreatic    Leukemia Brother         "leukemia which led to intestinal cancer"    Cataracts Brother     Cancer Brother         leukemia then later stomach cancer    Drug abuse Son     Cancer Son         prostate cancer    Prostate cancer Son     COPD Daughter     Asthma Daughter     Hypertension Maternal Grandfather     Stroke Maternal Grandfather     Alcohol abuse Neg Hx     Diabetes Neg Hx     Mental retardation Neg Hx     Mental illness Neg Hx        Assessment:   Seems to have improved since his pacing.  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  1. Postural hypotension    2. Ischemic cardiomyopathy    3. Coronary artery disease : multiple vessels, s/p PTCA    4. Chronic systolic congestive heart failure    5. Chronic diastolic heart failure    6. Paroxysmal atrial fibrillation    7. Cardiac pacemaker in situ    8. Rheumatoid arthritis involving multiple sites with positive rheumatoid factor    9. Macrocytic anemia    10. Diffuse large B-cell " lymphoma, unspecified body region    11. S/P gastric bypass    12. Hyperparathyroidism    13. S/P TKR (total knee replacement), bilateral    14. NSAID long-term use        Plan:      BNP  Echo  Chest x-ray    I discussed routine device follow up including quarterly to bi-annual device checks for device function as well as yearly follow up in the EP clinic. The patient  was advised to call with any concerns regarding their device. Device clinic follow up as scheduled. RTC 6 m           Orders Placed This Encounter   Procedures    X-Ray Chest PA And Lateral     Standing Status:   Future     Number of Occurrences:   1     Standing Expiration Date:   10/26/2024    BNP     Standing Status:   Future     Number of Occurrences:   1     Standing Expiration Date:   12/25/2024    Echo     Standing Status:   Future     Standing Expiration Date:   10/27/2024     Order Specific Question:   Release to patient     Answer:   Immediate       Follow up in about 6 months (around 4/27/2024), or if symptoms worsen or fail to improve, for n may.    There are no discontinued medications.         Medication List with Changes/Refills   Current Medications    ACETAMINOPHEN (TYLENOL ARTHRITIS PAIN) 650 MG TBSR    Take 650 mg by mouth every 8 (eight) hours.    ALLOPURINOL (ZYLOPRIM) 300 MG TABLET    Take 1 tablet (300 mg total) by mouth once daily.    ASCORBIC ACID, VITAMIN C, (VITAMIN C) 100 MG TABLET    Take by mouth once daily.    ASPIRIN (ECOTRIN) 81 MG EC TABLET    Take 81 mg by mouth nightly.     AZELASTINE (ASTELIN) 137 MCG (0.1 %) NASAL SPRAY    1 spray (137 mcg total) by Nasal route 2 (two) times daily.    BUSPIRONE (BUSPAR) 7.5 MG TABLET    Take 1 tablet (7.5 mg total) by mouth 2 (two) times a day.    CALCITRIOL (ROCALTROL) 0.25 MCG CAP    TAKE ONE CAPSULE BY MOUTH EVERY MONDAY, WEDNESDAY AND FRIDAY    CEFADROXIL (DURICEF) 500 MG CAP    Take 2 capsules by mouth tonight @ 8 pm then take 1 capssule by mouth every 12 hours until done.     CELECOXIB (CELEBREX) 100 MG CAPSULE    Take 1 capsule (100 mg total) by mouth 2 (two) times daily.    CLOPIDOGREL (PLAVIX) 75 MG TABLET    TAKE ONE TABLET BY MOUTH EVERY DAY    DICLOFENAC SODIUM 1 % GEL    Apply 2 g topically once daily. Apply 2 g over painful joints once or twice a day.    DIPRIVAN 10 MG/ML INFUSION        DULOXETINE (CYMBALTA) 30 MG CAPSULE    TAKE ONE CAPSULE BY MOUTH EVERY DAY    ELIQUIS 2.5 MG TAB    TAKE ONE TABLET BY MOUTH TWICE DAILY    FLUTICASONE PROPIONATE (FLONASE) 50 MCG/ACTUATION NASAL SPRAY    2 sprays (100 mcg total) by Each Nostril route once daily.    FUROSEMIDE (LASIX) 40 MG TABLET    Take 1 tablet (40 mg total) by mouth once daily. 1 every day, then 2 tablets every other day    GLUCOSAMINE-D3-BOSWELLIA SERR (OSTEO BI-FLEX, 5-LOXIN,) 1,500-400-100 MG-UNIT-MG TAB    Take by mouth.    HYDROCODONE-ACETAMINOPHEN (NORCO)  MG PER TABLET    Take 1 tablet by mouth every 6 (six) hours as needed for Pain. Please use sparingly. Start with 1/2 tablet per dose - it may be sufficient.    HYDROXYCHLOROQUINE (PLAQUENIL) 200 MG TABLET    Take 1 tablet (200 mg total) by mouth every other day.    IRON-VITAMIN C 100-250 MG, ICAR-C, 100-250 MG TAB    TAKE ONE TABLET BY MOUTH EVERY DAY    LEVOCETIRIZINE (XYZAL) 5 MG TABLET    Take 1 tablet (5 mg total) by mouth every evening.    LEVOTHYROXINE (SYNTHROID) 75 MCG TABLET    Take 1 tablet (75 mcg total) by mouth before breakfast.    METHYLPREDNISOLONE (MEDROL DOSEPACK) 4 MG TABLET    use as directed    METOPROLOL TARTRATE (LOPRESSOR) 25 MG TABLET    TAKE ONE TABLET BY MOUTH TWICE DAILY    MULTIVITAMIN (THERAGRAN) PER TABLET    Take 1 tablet by mouth once daily.    NITROGLYCERIN 0.2 MG/HR TD PT24 (NITRODUR) 0.2 MG/HR    Place 1 patch onto the skin once daily.    PRAVASTATIN (PRAVACHOL) 40 MG TABLET    TAKE ONE TABLET BY MOUTH ONCE DAILY    PREGABALIN (LYRICA) 50 MG CAPSULE    Take 50 mg by mouth 3 (three) times daily.    SERTRALINE (ZOLOFT) 100 MG  TABLET    Take 1.5 tablets (150 mg total) by mouth once daily.    SODIUM BICARBONATE 650 MG TABLET    TAKE ONE TABLET BY MOUTH TWICE DAILY    TUMERIC-GING-OLIVE-OREG-CAPRYL 100 MG-150 MG- 50 MG-150 MG CAP    Take by mouth.    VITAMIN D2 1,250 MCG (50,000 UNIT) CAPSULE    TAKE ONE CAPSULE BY MOUTH EVERY 7 DAYS    ZINC ACETATE ORAL    Take 250 mg by mouth once daily.         This note is at least partially dictated using the M*Modal Fluency Direct word recognition program. There are word recognition mistakes that are occasionally missed on review.

## 2023-10-30 ENCOUNTER — HOSPITAL ENCOUNTER (OUTPATIENT)
Dept: CARDIOLOGY | Facility: HOSPITAL | Age: 83
Discharge: HOME | End: 2023-10-30
Attending: INTERNAL MEDICINE
Payer: MEDICARE

## 2023-10-30 VITALS
BODY MASS INDEX: 22.02 KG/M2 | WEIGHT: 129 LBS | HEIGHT: 64 IN | DIASTOLIC BLOOD PRESSURE: 80 MMHG | SYSTOLIC BLOOD PRESSURE: 130 MMHG

## 2023-10-30 DIAGNOSIS — I50.22 CHRONIC SYSTOLIC CONGESTIVE HEART FAILURE: ICD-10-CM

## 2023-10-30 LAB
AORTIC ROOT ANNULUS: 3.1 CM
ASCENDING AORTA: 3.28 CM
AV INDEX (PROSTH): 0.44
AV MEAN GRADIENT: 6 MMHG
AV PEAK GRADIENT: 10 MMHG
AV VALVE AREA BY VELOCITY RATIO: 1.39 CM²
AV VALVE AREA: 1.54 CM²
AV VELOCITY RATIO: 0.4
BSA FOR ECHO PROCEDURE: 1.63 M2
CV ECHO LV RWT: 0.42 CM
DOP CALC AO PEAK VEL: 1.62 M/S
DOP CALC AO VTI: 35.5 CM
DOP CALC LVOT AREA: 3.5 CM2
DOP CALC LVOT DIAMETER: 2.12 CM
DOP CALC LVOT PEAK VEL: 0.64 M/S
DOP CALC LVOT STROKE VOLUME: 54.69 CM3
DOP CALC RVOT PEAK VEL: 0.41 M/S
DOP CALC RVOT VTI: 6.8 CM
DOP CALCLVOT PEAK VEL VTI: 15.5 CM
E WAVE DECELERATION TIME: 138.04 MSEC
E/A RATIO: 4.79
ECHO LV POSTERIOR WALL: 0.99 CM (ref 0.6–1.1)
EJECTION FRACTION: 45 %
FRACTIONAL SHORTENING: 25 % (ref 28–44)
INTERVENTRICULAR SEPTUM: 1.06 CM (ref 0.6–1.1)
IVC DIAMETER: 1.67 CM
IVRT: 108.47 MSEC
LA MAJOR: 5.97 CM
LA WIDTH: 4.9 CM
LEFT ATRIUM SIZE: 4.31 CM
LEFT ATRIUM VOLUME INDEX MOD: 61 ML/M2
LEFT ATRIUM VOLUME MOD: 98.88 CM3
LEFT INTERNAL DIMENSION IN SYSTOLE: 3.53 CM (ref 2.1–4)
LEFT VENTRICLE DIASTOLIC VOLUME INDEX: 63.75 ML/M2
LEFT VENTRICLE DIASTOLIC VOLUME: 103.28 ML
LEFT VENTRICLE MASS INDEX: 106 G/M2
LEFT VENTRICLE SYSTOLIC VOLUME INDEX: 32 ML/M2
LEFT VENTRICLE SYSTOLIC VOLUME: 51.76 ML
LEFT VENTRICULAR INTERNAL DIMENSION IN DIASTOLE: 4.72 CM (ref 3.5–6)
LEFT VENTRICULAR MASS: 171.27 G
LV LATERAL E/E' RATIO: 13.4 M/S
LVOT MG: 1.02 MMHG
LVOT MV: 0.48 CM/S
MV PEAK A VEL: 0.28 M/S
MV PEAK E VEL: 1.34 M/S
MV STENOSIS PRESSURE HALF TIME: 40.03 MS
MV VALVE AREA P 1/2 METHOD: 5.5 CM2
OHS LV EJECTION FRACTION SIMPSONS BIPLANE MOD: 48 %
PISA TR MAX VEL: 3.23 M/S
PV MEAN GRADIENT: 0 MMHG
PV MV: 0.33 M/S
PV PEAK GRADIENT: 1 MMHG
PV PEAK VELOCITY: 0.5 M/S
RA MAJOR: 4.78 CM
RA PRESSURE ESTIMATED: 3 MMHG
RA WIDTH: 4.01 CM
RIGHT VENTRICULAR END-DIASTOLIC DIMENSION: 3.1 CM
RV TB RVSP: 6 MMHG
SINUS: 3.03 CM
STJ: 2.49 CM
TDI LATERAL: 0.1 M/S
TR MAX PG: 42 MMHG
TRICUSPID ANNULAR PLANE SYSTOLIC EXCURSION: 1.07 CM
TV REST PULMONARY ARTERY PRESSURE: 45 MMHG
Z-SCORE OF LEFT VENTRICULAR DIMENSION IN END DIASTOLE: 0.29
Z-SCORE OF LEFT VENTRICULAR DIMENSION IN END SYSTOLE: 1.71

## 2023-10-30 PROCEDURE — 93306 ECHO (CUPID ONLY): ICD-10-PCS | Mod: 26,HCNC,, | Performed by: INTERNAL MEDICINE

## 2023-10-30 PROCEDURE — 93306 TTE W/DOPPLER COMPLETE: CPT | Mod: 26,HCNC,, | Performed by: INTERNAL MEDICINE

## 2023-10-30 PROCEDURE — 93306 TTE W/DOPPLER COMPLETE: CPT | Mod: HCNC

## 2023-10-31 ENCOUNTER — TELEPHONE (OUTPATIENT)
Dept: CARDIOLOGY | Facility: CLINIC | Age: 83
End: 2023-10-31
Payer: MEDICARE

## 2023-10-31 ENCOUNTER — LAB VISIT (OUTPATIENT)
Dept: LAB | Facility: HOSPITAL | Age: 83
End: 2023-10-31
Attending: INTERNAL MEDICINE
Payer: MEDICARE

## 2023-10-31 DIAGNOSIS — I50.22 CHRONIC SYSTOLIC CONGESTIVE HEART FAILURE: ICD-10-CM

## 2023-10-31 DIAGNOSIS — I50.22 CHRONIC SYSTOLIC CONGESTIVE HEART FAILURE: Primary | ICD-10-CM

## 2023-10-31 PROCEDURE — 36415 COLL VENOUS BLD VENIPUNCTURE: CPT | Mod: HCNC | Performed by: INTERNAL MEDICINE

## 2023-10-31 PROCEDURE — 83880 ASSAY OF NATRIURETIC PEPTIDE: CPT | Mod: HCNC | Performed by: INTERNAL MEDICINE

## 2023-10-31 NOTE — TELEPHONE ENCOUNTER
Pt notified and verbalized understanding labs set up for later today pb----- Message from Velasquez Vizcaino MD sent at 10/30/2023  9:51 PM CDT -----  See comments below and call patient to discuss.   Please close encounter when done -- no need to route back to me.  Thanks  Ejection fraction is improving.  It is now estimated to be 48%.  The ventricular dimensions are also improving they are shrinking slightly.  Her BNP is elevated but this may be unreliable due to renal failure.  Please obtain a proBNP and then will decide if she needs more diuretics.  At this point I do not think so.

## 2023-10-31 NOTE — PROGRESS NOTES
See comments below and call patient to discuss.   Please close encounter when done -- no need to route back to me.  Thanks  Ejection fraction is improving.  It is now estimated to be 48%.  The ventricular dimensions are also improving they are shrinking slightly.  Her BNP is elevated but this may be unreliable due to renal failure.  Please obtain a proBNP and then will decide if she needs more diuretics.  At this point I do not think so.

## 2023-11-02 LAB — NT-PROBNP SERPL IA-MCNC: ABNORMAL PG/ML

## 2023-11-16 DIAGNOSIS — M1A.09X0 IDIOPATHIC CHRONIC GOUT, MULTIPLE SITES, WITHOUT TOPHUS (TOPHI): ICD-10-CM

## 2023-11-16 RX ORDER — ALLOPURINOL 300 MG/1
300 TABLET ORAL DAILY
Qty: 90 TABLET | Refills: 11 | Status: SHIPPED | OUTPATIENT
Start: 2023-11-16

## 2023-12-14 DIAGNOSIS — F41.9 ANXIETY: ICD-10-CM

## 2023-12-14 NOTE — TELEPHONE ENCOUNTER
Refill Routing Note   Medication(s) are not appropriate for processing by [facility] for the following reason(s):        Non-participating provider  Outside of protocol: non-delegated    [facility] action(s):  Route      Medication Therapy Plan:         Appointments  past 12m or future 3m with PCP    Date Provider   Last Visit   1/19/2023 Lissa Aguilar PA-C   Next Visit   Visit date not found Lissa Aguilar PA-C   ED visits in past 90 days: 0        Note composed:11:34 AM 12/14/2023

## 2023-12-15 RX ORDER — BUSPIRONE HYDROCHLORIDE 7.5 MG/1
7.5 TABLET ORAL 2 TIMES DAILY
Qty: 60 TABLET | Refills: 0 | Status: SHIPPED | OUTPATIENT
Start: 2023-12-15 | End: 2024-01-19

## 2023-12-15 NOTE — TELEPHONE ENCOUNTER
30 day supply approved. Patient due for annual physical. Needs to be seen prior to additional refills.

## 2023-12-19 ENCOUNTER — PATIENT MESSAGE (OUTPATIENT)
Dept: INTERNAL MEDICINE | Facility: CLINIC | Age: 83
End: 2023-12-19

## 2023-12-19 ENCOUNTER — OFFICE VISIT (OUTPATIENT)
Dept: INTERNAL MEDICINE | Facility: CLINIC | Age: 83
End: 2023-12-19
Payer: MEDICARE

## 2023-12-19 VITALS
DIASTOLIC BLOOD PRESSURE: 100 MMHG | SYSTOLIC BLOOD PRESSURE: 160 MMHG | BODY MASS INDEX: 22.02 KG/M2 | HEIGHT: 64 IN | OXYGEN SATURATION: 95 % | TEMPERATURE: 96 F | HEART RATE: 54 BPM | RESPIRATION RATE: 18 BRPM | WEIGHT: 129 LBS

## 2023-12-19 DIAGNOSIS — F41.8 SITUATIONAL ANXIETY: ICD-10-CM

## 2023-12-19 DIAGNOSIS — Z23 NEED FOR VACCINATION: ICD-10-CM

## 2023-12-19 DIAGNOSIS — F33.42 RECURRENT MAJOR DEPRESSIVE DISORDER, IN FULL REMISSION: ICD-10-CM

## 2023-12-19 DIAGNOSIS — T45.1X5A CHEMOTHERAPY-INDUCED NEUROPATHY: Chronic | ICD-10-CM

## 2023-12-19 DIAGNOSIS — M05.79 RHEUMATOID ARTHRITIS INVOLVING MULTIPLE SITES WITH POSITIVE RHEUMATOID FACTOR: ICD-10-CM

## 2023-12-19 DIAGNOSIS — E03.9 HYPOTHYROIDISM (ACQUIRED): Chronic | ICD-10-CM

## 2023-12-19 DIAGNOSIS — G62.0 CHEMOTHERAPY-INDUCED NEUROPATHY: Chronic | ICD-10-CM

## 2023-12-19 DIAGNOSIS — Z79.899 IMMUNOSUPPRESSION DUE TO DRUG THERAPY: ICD-10-CM

## 2023-12-19 DIAGNOSIS — E21.3 HYPERPARATHYROIDISM: ICD-10-CM

## 2023-12-19 DIAGNOSIS — I70.0 ATHEROSCLEROSIS OF AORTA: ICD-10-CM

## 2023-12-19 DIAGNOSIS — J84.10 CALCIFIED GRANULOMA OF LUNG: ICD-10-CM

## 2023-12-19 DIAGNOSIS — F41.9 INSOMNIA SECONDARY TO ANXIETY: ICD-10-CM

## 2023-12-19 DIAGNOSIS — I25.5 ISCHEMIC CARDIOMYOPATHY: Chronic | ICD-10-CM

## 2023-12-19 DIAGNOSIS — E55.9 VITAMIN D DEFICIENCY: ICD-10-CM

## 2023-12-19 DIAGNOSIS — I10 ESSENTIAL HYPERTENSION: Chronic | ICD-10-CM

## 2023-12-19 DIAGNOSIS — C83.30 DIFFUSE LARGE B-CELL LYMPHOMA, UNSPECIFIED BODY REGION: ICD-10-CM

## 2023-12-19 DIAGNOSIS — L60.0 INGROWN TOENAIL: ICD-10-CM

## 2023-12-19 DIAGNOSIS — D84.821 IMMUNOSUPPRESSION DUE TO DRUG THERAPY: ICD-10-CM

## 2023-12-19 DIAGNOSIS — N18.32 STAGE 3B CHRONIC KIDNEY DISEASE: ICD-10-CM

## 2023-12-19 DIAGNOSIS — D64.9 CHRONIC ANEMIA: Chronic | ICD-10-CM

## 2023-12-19 DIAGNOSIS — E78.2 MIXED HYPERLIPIDEMIA: Chronic | ICD-10-CM

## 2023-12-19 DIAGNOSIS — M1A.09X0 IDIOPATHIC CHRONIC GOUT, MULTIPLE SITES, WITHOUT TOPHUS (TOPHI): ICD-10-CM

## 2023-12-19 DIAGNOSIS — F51.05 INSOMNIA SECONDARY TO ANXIETY: ICD-10-CM

## 2023-12-19 DIAGNOSIS — M85.852 OSTEOPENIA OF LEFT HIP: ICD-10-CM

## 2023-12-19 DIAGNOSIS — I48.0 PAROXYSMAL ATRIAL FIBRILLATION: Primary | ICD-10-CM

## 2023-12-19 DIAGNOSIS — I50.22 CHRONIC SYSTOLIC CONGESTIVE HEART FAILURE: ICD-10-CM

## 2023-12-19 PROBLEM — G89.29 CHRONIC PAIN OF LEFT ANKLE: Status: RESOLVED | Noted: 2023-04-19 | Resolved: 2023-12-19

## 2023-12-19 PROBLEM — M25.572 CHRONIC PAIN OF LEFT ANKLE: Status: RESOLVED | Noted: 2023-04-19 | Resolved: 2023-12-19

## 2023-12-19 PROBLEM — M25.511 ARTHRALGIA OF RIGHT ACROMIOCLAVICULAR JOINT: Status: RESOLVED | Noted: 2020-01-31 | Resolved: 2023-12-19

## 2023-12-19 PROCEDURE — 90694 FLU VACCINE - QUADRIVALENT - ADJUVANTED: ICD-10-PCS | Mod: HCNC,S$GLB,, | Performed by: PHYSICIAN ASSISTANT

## 2023-12-19 PROCEDURE — 99999 PR PBB SHADOW E&M-EST. PATIENT-LVL V: CPT | Mod: PBBFAC,HCNC,, | Performed by: PHYSICIAN ASSISTANT

## 2023-12-19 PROCEDURE — 3288F PR FALLS RISK ASSESSMENT DOCUMENTED: ICD-10-PCS | Mod: HCNC,CPTII,S$GLB, | Performed by: PHYSICIAN ASSISTANT

## 2023-12-19 PROCEDURE — 1100F PTFALLS ASSESS-DOCD GE2>/YR: CPT | Mod: HCNC,CPTII,S$GLB, | Performed by: PHYSICIAN ASSISTANT

## 2023-12-19 PROCEDURE — 99214 PR OFFICE/OUTPT VISIT, EST, LEVL IV, 30-39 MIN: ICD-10-PCS | Mod: 25,HCNC,S$GLB, | Performed by: PHYSICIAN ASSISTANT

## 2023-12-19 PROCEDURE — 3077F SYST BP >= 140 MM HG: CPT | Mod: HCNC,CPTII,S$GLB, | Performed by: PHYSICIAN ASSISTANT

## 2023-12-19 PROCEDURE — 3080F PR MOST RECENT DIASTOLIC BLOOD PRESSURE >= 90 MM HG: ICD-10-PCS | Mod: HCNC,CPTII,S$GLB, | Performed by: PHYSICIAN ASSISTANT

## 2023-12-19 PROCEDURE — 3077F PR MOST RECENT SYSTOLIC BLOOD PRESSURE >= 140 MM HG: ICD-10-PCS | Mod: HCNC,CPTII,S$GLB, | Performed by: PHYSICIAN ASSISTANT

## 2023-12-19 PROCEDURE — 1159F MED LIST DOCD IN RCRD: CPT | Mod: HCNC,CPTII,S$GLB, | Performed by: PHYSICIAN ASSISTANT

## 2023-12-19 PROCEDURE — 1100F PR PT FALLS ASSESS DOC 2+ FALLS/FALL W/INJURY/YR: ICD-10-PCS | Mod: HCNC,CPTII,S$GLB, | Performed by: PHYSICIAN ASSISTANT

## 2023-12-19 PROCEDURE — 1157F PR ADVANCE CARE PLAN OR EQUIV PRESENT IN MEDICAL RECORD: ICD-10-PCS | Mod: HCNC,CPTII,S$GLB, | Performed by: PHYSICIAN ASSISTANT

## 2023-12-19 PROCEDURE — 99999 PR PBB SHADOW E&M-EST. PATIENT-LVL V: ICD-10-PCS | Mod: PBBFAC,HCNC,, | Performed by: PHYSICIAN ASSISTANT

## 2023-12-19 PROCEDURE — 90694 VACC AIIV4 NO PRSRV 0.5ML IM: CPT | Mod: HCNC,S$GLB,, | Performed by: PHYSICIAN ASSISTANT

## 2023-12-19 PROCEDURE — 99214 OFFICE O/P EST MOD 30 MIN: CPT | Mod: 25,HCNC,S$GLB, | Performed by: PHYSICIAN ASSISTANT

## 2023-12-19 PROCEDURE — 1126F AMNT PAIN NOTED NONE PRSNT: CPT | Mod: HCNC,CPTII,S$GLB, | Performed by: PHYSICIAN ASSISTANT

## 2023-12-19 PROCEDURE — 1159F PR MEDICATION LIST DOCUMENTED IN MEDICAL RECORD: ICD-10-PCS | Mod: HCNC,CPTII,S$GLB, | Performed by: PHYSICIAN ASSISTANT

## 2023-12-19 PROCEDURE — G0008 FLU VACCINE - QUADRIVALENT - ADJUVANTED: ICD-10-PCS | Mod: HCNC,S$GLB,, | Performed by: PHYSICIAN ASSISTANT

## 2023-12-19 PROCEDURE — 3080F DIAST BP >= 90 MM HG: CPT | Mod: HCNC,CPTII,S$GLB, | Performed by: PHYSICIAN ASSISTANT

## 2023-12-19 PROCEDURE — 1126F PR PAIN SEVERITY QUANTIFIED, NO PAIN PRESENT: ICD-10-PCS | Mod: HCNC,CPTII,S$GLB, | Performed by: PHYSICIAN ASSISTANT

## 2023-12-19 PROCEDURE — 1157F ADVNC CARE PLAN IN RCRD: CPT | Mod: HCNC,CPTII,S$GLB, | Performed by: PHYSICIAN ASSISTANT

## 2023-12-19 PROCEDURE — 3288F FALL RISK ASSESSMENT DOCD: CPT | Mod: HCNC,CPTII,S$GLB, | Performed by: PHYSICIAN ASSISTANT

## 2023-12-19 PROCEDURE — G0008 ADMIN INFLUENZA VIRUS VAC: HCPCS | Mod: HCNC,S$GLB,, | Performed by: PHYSICIAN ASSISTANT

## 2023-12-19 NOTE — PROGRESS NOTES
Subjective:       Patient ID: Violet Swenson is a 83 y.o. female.    Chief Complaint: Follow-up      Patient presents to clinic today for followup of chronic conditions.        Review of Systems   Constitutional:  Negative for chills, fatigue, fever and unexpected weight change.   Eyes:  Negative for visual disturbance.   Respiratory:  Negative for shortness of breath.    Cardiovascular:  Negative for chest pain.   Musculoskeletal:  Negative for myalgias.   Neurological:  Negative for headaches.       Objective:      Physical Exam  Vitals and nursing note reviewed.   Constitutional:       General: She is not in acute distress.     Appearance: She is well-developed.   HENT:      Head: Normocephalic and atraumatic.   Eyes:      General: Lids are normal. No scleral icterus.     Extraocular Movements: Extraocular movements intact.      Conjunctiva/sclera: Conjunctivae normal.   Cardiovascular:      Rate and Rhythm: Normal rate and regular rhythm.   Pulmonary:      Effort: Pulmonary effort is normal.      Breath sounds: Normal breath sounds. No decreased breath sounds, wheezing, rhonchi or rales.   Neurological:      Mental Status: She is alert.      Cranial Nerves: No cranial nerve deficit.   Psychiatric:         Mood and Affect: Mood and affect normal.         Assessment:       1. Paroxysmal atrial fibrillation    2. Ischemic cardiomyopathy    3. Chronic systolic congestive heart failure    4. Recurrent major depressive disorder, in full remission    5. Rheumatoid arthritis involving multiple sites with positive rheumatoid factor    6. Stage 3b chronic kidney disease    7. Diffuse large B-cell lymphoma, unspecified body region    8. Chemotherapy-induced neuropathy    9. Calcified granuloma of lung    10. Atherosclerosis of aorta    11. Hyperparathyroidism    12. Immunosuppression due to drug therapy    13. Essential hypertension    14. Mixed hyperlipidemia    15. Situational anxiety    16. Insomnia secondary  to anxiety    17. Chronic anemia    18. Hypothyroidism (acquired)    19. Vitamin D deficiency    20. Osteopenia of left hip    21. Idiopathic chronic gout, multiple sites, without tophus (tophi)    22. Ingrown toenail    23. Need for vaccination        Plan:   1. Paroxysmal atrial fibrillation  Overview:  Followed by Cardiology, continue current treatment plan       2. Ischemic cardiomyopathy  Overview:  Followed by Cardiology, continue current treatment plan       3. Chronic systolic congestive heart failure  Overview:  Followed by Cardiology, continue current treatment plan       4. Recurrent major depressive disorder, in full remission  Overview:  Stable on Zoloft      5. Rheumatoid arthritis involving multiple sites with positive rheumatoid factor  Overview:  Followed by Rheumatology, continue current treatment plan       6. Stage 3b chronic kidney disease  Overview:  Followed by Nephrology, continue current treatment plan       7. Diffuse large B-cell lymphoma, unspecified body region  Overview:  Followed by Dr. Prescott, Hem/Onc, continue with current treatment plan       8. Chemotherapy-induced neuropathy  Overview:  Stable on Lyrica and Cymbalta      9. Calcified granuloma of lung  Overview:  PET CT 2/21, right middle lobe, stable      10. Atherosclerosis of aorta  Overview:  CXR 11/2021, on ASA and statin      11. Hyperparathyroidism  Overview:  Followed by Nephrology, continue current treatment plan       12. Immunosuppression due to drug therapy  Overview:  On Plaquenil. Followed by Rheumatology, continue current treatment plan       13. Essential hypertension  Assessment & Plan:  /100, uncontrolled, continue Lasix, metoprolol      14. Mixed hyperlipidemia  Assessment & Plan:  Status pending lab, continue pravastatin    Orders:  -     Comprehensive Metabolic Panel; Future; Expected date: 06/19/2024  -     Lipid Panel; Future; Expected date: 06/19/2024    15. Situational anxiety  Overview:  Stable on  BuSpar      16. Insomnia secondary to anxiety    17. Chronic anemia  Overview:  Followed by Hematology/Oncology, continue current treatment plan     Orders:  -     CBC Auto Differential; Future; Expected date: 12/19/2023  -     CBC Auto Differential; Future; Expected date: 06/19/2024    18. Hypothyroidism (acquired)  Assessment & Plan:  Status pending lab, continue levothyroxine    Orders:  -     TSH; Future; Expected date: 12/19/2023  -     T4, Free; Future; Expected date: 12/19/2023  -     TSH; Future; Expected date: 06/19/2024  -     T4, Free; Future; Expected date: 06/19/2024    19. Vitamin D deficiency  Overview:  Continue supplement    Orders:  -     Vitamin D; Future; Expected date: 12/19/2023  -     Vitamin D; Future; Expected date: 06/19/2024    20. Osteopenia of left hip  Overview:  DEXA 01/2023      21. Idiopathic chronic gout, multiple sites, without tophus (tophi)  Overview:  Continue allopurinol      22. Ingrown toenail  -     Ambulatory referral/consult to Podiatry; Future; Expected date: 12/26/2023    23. Need for vaccination  -     Influenza - Quadrivalent (Adjuvanted)        Fasting labs ASAP  Flu today  Discussed Covid booster, scheduling information given.  Annual with Dr. Coronel scheduled with fasting labs PTA   Health Maintenance reviewed/updated.

## 2023-12-19 NOTE — PATIENT INSTRUCTIONS
The bivalent covid booster is now available. You can visit [facility] in our primary care building by the front entrance to get this vaccine.     Pharmacy hours:  Monday-Friday 8am-5:30pm     Please bring your medication or a written list to your next office visit.    If you check your blood pressure at home, please bring written your blood pressure log and your blood pressure machine to your next office visit.

## 2023-12-19 NOTE — TELEPHONE ENCOUNTER
No care due was identified.  [facility] Embedded Care Due Messages. Reference number: [id number].   12/19/2023 4:23:20 PM CST

## 2023-12-20 RX ORDER — FUROSEMIDE 40 MG/1
40 TABLET ORAL DAILY
Qty: 30 TABLET | Refills: 0
Start: 2023-12-20 | End: 2024-01-23 | Stop reason: SDUPTHER

## 2023-12-28 ENCOUNTER — LAB VISIT (OUTPATIENT)
Dept: LAB | Facility: HOSPITAL | Age: 83
End: 2023-12-28
Attending: INTERNAL MEDICINE
Payer: MEDICARE

## 2023-12-28 DIAGNOSIS — E03.9 HYPOTHYROIDISM (ACQUIRED): Chronic | ICD-10-CM

## 2023-12-28 DIAGNOSIS — E78.2 MIXED HYPERLIPIDEMIA: Chronic | ICD-10-CM

## 2023-12-28 DIAGNOSIS — M1A.09X0 IDIOPATHIC CHRONIC GOUT, MULTIPLE SITES, WITHOUT TOPHUS (TOPHI): ICD-10-CM

## 2023-12-28 DIAGNOSIS — D64.9 CHRONIC ANEMIA: Chronic | ICD-10-CM

## 2023-12-28 DIAGNOSIS — E55.9 VITAMIN D DEFICIENCY: ICD-10-CM

## 2023-12-28 DIAGNOSIS — I25.10 CORONARY ARTERY DISEASE INVOLVING NATIVE CORONARY ARTERY OF NATIVE HEART WITHOUT ANGINA PECTORIS: Chronic | ICD-10-CM

## 2023-12-28 LAB
25(OH)D3+25(OH)D2 SERPL-MCNC: 33 NG/ML (ref 30–96)
ALBUMIN SERPL BCP-MCNC: 3.8 G/DL (ref 3.5–5.2)
ALP SERPL-CCNC: 48 U/L (ref 55–135)
ALT SERPL W/O P-5'-P-CCNC: 39 U/L (ref 10–44)
ANION GAP SERPL CALC-SCNC: 8 MMOL/L (ref 8–16)
AST SERPL-CCNC: 39 U/L (ref 10–40)
BASOPHILS # BLD AUTO: 0.04 K/UL (ref 0–0.2)
BASOPHILS NFR BLD: 0.6 % (ref 0–1.9)
BILIRUB SERPL-MCNC: 0.4 MG/DL (ref 0.1–1)
BUN SERPL-MCNC: 20 MG/DL (ref 8–23)
CALCIUM SERPL-MCNC: 8.8 MG/DL (ref 8.7–10.5)
CHLORIDE SERPL-SCNC: 110 MMOL/L (ref 95–110)
CHOLEST SERPL-MCNC: 164 MG/DL (ref 120–199)
CHOLEST/HDLC SERPL: 2.6 {RATIO} (ref 2–5)
CO2 SERPL-SCNC: 23 MMOL/L (ref 23–29)
CREAT SERPL-MCNC: 1.4 MG/DL (ref 0.5–1.4)
DIFFERENTIAL METHOD BLD: ABNORMAL
EOSINOPHIL # BLD AUTO: 0.1 K/UL (ref 0–0.5)
EOSINOPHIL NFR BLD: 1.1 % (ref 0–8)
ERYTHROCYTE [DISTWIDTH] IN BLOOD BY AUTOMATED COUNT: 16.4 % (ref 11.5–14.5)
EST. GFR  (NO RACE VARIABLE): 37.6 ML/MIN/1.73 M^2
GLUCOSE SERPL-MCNC: 83 MG/DL (ref 70–110)
HCT VFR BLD AUTO: 36.1 % (ref 37–48.5)
HDLC SERPL-MCNC: 62 MG/DL (ref 40–75)
HDLC SERPL: 37.8 % (ref 20–50)
HGB BLD-MCNC: 11.3 G/DL (ref 12–16)
IMM GRANULOCYTES # BLD AUTO: 0.03 K/UL (ref 0–0.04)
IMM GRANULOCYTES NFR BLD AUTO: 0.4 % (ref 0–0.5)
LDLC SERPL CALC-MCNC: 74.2 MG/DL (ref 63–159)
LYMPHOCYTES # BLD AUTO: 3.5 K/UL (ref 1–4.8)
LYMPHOCYTES NFR BLD: 47.8 % (ref 18–48)
MCH RBC QN AUTO: 34.5 PG (ref 27–31)
MCHC RBC AUTO-ENTMCNC: 31.3 G/DL (ref 32–36)
MCV RBC AUTO: 110 FL (ref 82–98)
MONOCYTES # BLD AUTO: 0.5 K/UL (ref 0.3–1)
MONOCYTES NFR BLD: 7 % (ref 4–15)
NEUTROPHILS # BLD AUTO: 3.1 K/UL (ref 1.8–7.7)
NEUTROPHILS NFR BLD: 43.1 % (ref 38–73)
NONHDLC SERPL-MCNC: 102 MG/DL
NRBC BLD-RTO: 0 /100 WBC
PLATELET # BLD AUTO: 277 K/UL (ref 150–450)
PMV BLD AUTO: 10.2 FL (ref 9.2–12.9)
POTASSIUM SERPL-SCNC: 4.5 MMOL/L (ref 3.5–5.1)
PROT SERPL-MCNC: 6.7 G/DL (ref 6–8.4)
RBC # BLD AUTO: 3.28 M/UL (ref 4–5.4)
SODIUM SERPL-SCNC: 141 MMOL/L (ref 136–145)
T4 FREE SERPL-MCNC: 0.76 NG/DL (ref 0.71–1.51)
TRIGL SERPL-MCNC: 139 MG/DL (ref 30–150)
TSH SERPL DL<=0.005 MIU/L-ACNC: 11.06 UIU/ML (ref 0.4–4)
URATE SERPL-MCNC: 5.7 MG/DL (ref 2.4–5.7)
WBC # BLD AUTO: 7.24 K/UL (ref 3.9–12.7)

## 2023-12-28 PROCEDURE — 80053 COMPREHEN METABOLIC PANEL: CPT | Mod: HCNC | Performed by: INTERNAL MEDICINE

## 2023-12-28 PROCEDURE — 84443 ASSAY THYROID STIM HORMONE: CPT | Mod: HCNC | Performed by: PHYSICIAN ASSISTANT

## 2023-12-28 PROCEDURE — 84439 ASSAY OF FREE THYROXINE: CPT | Mod: HCNC | Performed by: PHYSICIAN ASSISTANT

## 2023-12-28 PROCEDURE — 80061 LIPID PANEL: CPT | Mod: HCNC | Performed by: INTERNAL MEDICINE

## 2023-12-28 PROCEDURE — 85025 COMPLETE CBC W/AUTO DIFF WBC: CPT | Mod: HCNC | Performed by: PHYSICIAN ASSISTANT

## 2023-12-28 PROCEDURE — 36415 COLL VENOUS BLD VENIPUNCTURE: CPT | Mod: HCNC | Performed by: PHYSICIAN ASSISTANT

## 2023-12-28 PROCEDURE — 84550 ASSAY OF BLOOD/URIC ACID: CPT | Mod: HCNC | Performed by: PHYSICIAN ASSISTANT

## 2023-12-28 PROCEDURE — 82306 VITAMIN D 25 HYDROXY: CPT | Mod: HCNC | Performed by: PHYSICIAN ASSISTANT

## 2024-01-02 ENCOUNTER — CLINICAL SUPPORT (OUTPATIENT)
Dept: INTERNAL MEDICINE | Facility: CLINIC | Age: 84
End: 2024-01-02
Payer: MEDICARE

## 2024-01-02 ENCOUNTER — OFFICE VISIT (OUTPATIENT)
Dept: PODIATRY | Facility: CLINIC | Age: 84
End: 2024-01-02
Payer: MEDICARE

## 2024-01-02 VITALS — WEIGHT: 128 LBS | HEIGHT: 64 IN | BODY MASS INDEX: 21.85 KG/M2

## 2024-01-02 VITALS — DIASTOLIC BLOOD PRESSURE: 100 MMHG | SYSTOLIC BLOOD PRESSURE: 170 MMHG | HEART RATE: 65 BPM

## 2024-01-02 DIAGNOSIS — Z79.01 ON CONTINUOUS ORAL ANTICOAGULATION: ICD-10-CM

## 2024-01-02 DIAGNOSIS — T45.1X5A CHEMOTHERAPY-INDUCED NEUROPATHY: Chronic | ICD-10-CM

## 2024-01-02 DIAGNOSIS — I10 ESSENTIAL HYPERTENSION: Primary | ICD-10-CM

## 2024-01-02 DIAGNOSIS — M79.674 PAIN OF RIGHT GREAT TOE: ICD-10-CM

## 2024-01-02 DIAGNOSIS — G62.0 CHEMOTHERAPY-INDUCED NEUROPATHY: Chronic | ICD-10-CM

## 2024-01-02 DIAGNOSIS — L60.0 INGROWING NAIL, LEFT GREAT TOE: Primary | ICD-10-CM

## 2024-01-02 DIAGNOSIS — L60.0 INGROWING NAIL, RIGHT GREAT TOE: ICD-10-CM

## 2024-01-02 DIAGNOSIS — M79.675 PAIN OF LEFT GREAT TOE: ICD-10-CM

## 2024-01-02 PROCEDURE — 1100F PTFALLS ASSESS-DOCD GE2>/YR: CPT | Mod: HCNC,CPTII,S$GLB,ICN | Performed by: PODIATRIST

## 2024-01-02 PROCEDURE — 11732 AVLSN NAIL PLATE SIMPLE EACH: CPT | Mod: T5,HCNC,S$GLB,ICN | Performed by: PODIATRIST

## 2024-01-02 PROCEDURE — 99999 PR PBB SHADOW E&M-EST. PATIENT-LVL II: CPT | Mod: PBBFAC,HCNC,,

## 2024-01-02 PROCEDURE — 1160F RVW MEDS BY RX/DR IN RCRD: CPT | Mod: HCNC,CPTII,S$GLB,ICN | Performed by: PODIATRIST

## 2024-01-02 PROCEDURE — 1157F ADVNC CARE PLAN IN RCRD: CPT | Mod: HCNC,CPTII,S$GLB,ICN | Performed by: PODIATRIST

## 2024-01-02 PROCEDURE — 1125F AMNT PAIN NOTED PAIN PRSNT: CPT | Mod: HCNC,CPTII,S$GLB,ICN | Performed by: PODIATRIST

## 2024-01-02 PROCEDURE — 99999 PR PBB SHADOW E&M-EST. PATIENT-LVL IV: CPT | Mod: PBBFAC,HCNC,, | Performed by: PODIATRIST

## 2024-01-02 PROCEDURE — 3288F FALL RISK ASSESSMENT DOCD: CPT | Mod: HCNC,CPTII,S$GLB,ICN | Performed by: PODIATRIST

## 2024-01-02 PROCEDURE — 11730 AVULSION NAIL PLATE SIMPLE 1: CPT | Mod: TA,HCNC,S$GLB,ICN | Performed by: PODIATRIST

## 2024-01-02 PROCEDURE — 1159F MED LIST DOCD IN RCRD: CPT | Mod: HCNC,CPTII,S$GLB,ICN | Performed by: PODIATRIST

## 2024-01-02 PROCEDURE — 99203 OFFICE O/P NEW LOW 30 MIN: CPT | Mod: 25,HCNC,S$GLB,ICN | Performed by: PODIATRIST

## 2024-01-02 RX ORDER — AMLODIPINE BESYLATE 5 MG/1
5 TABLET ORAL DAILY
Qty: 30 TABLET | Refills: 1 | Status: SHIPPED | OUTPATIENT
Start: 2024-01-02 | End: 2024-01-04 | Stop reason: ALTCHOICE

## 2024-01-02 NOTE — PROGRESS NOTES
Patient is here for a nurse visit for blood pressure.  Patient used 2 identifiers (first&last name spelled and ).  Patient denies dizziness, and blurred vision.  She is having headaches.  Patient has not been check bp at home.  She has had medication this morning.  /100 P 65  Second bp 170/100

## 2024-01-02 NOTE — PROGRESS NOTES
Subjective:       Patient ID: Violet Swenson is a 83 y.o. female.    Chief Complaint: Ingrown Toenail (Ingrown toenail, pt was last seen by Lissa Aguilar PA-C on 12/19/2023,  rates 10, nondiabetic )      HPI: Violet Swenson presents to the office with complaints of pains to the left great toe, due to ingrowing. Patient also complains of pains to the right great toe, due to ingrowing as well. States mild drainage. States swelling, redness and moderate to severe pains. Symptoms have been on going for several days and are worsening. States difficulties with walking as a result of pains. Walking and standing, particularly with shoe gear, exacerbates the ailment. Pains are sharp in nature and are rated at approx. 8/10. Patient's Primary Care Provider is Marti Coronel MD.     Review of patient's allergies indicates:   Allergen Reactions    Corticosteroids (glucocorticoids) Nausea Only and Other (See Comments)     Stomach pain, dizziness, headache    Oxycodone Other (See Comments)     Blood pressure dropped       Past Medical History:   Diagnosis Date    Age-related osteoporosis without current pathological fracture 8/20/2018    Anemia     Anxiety     Arthritis     Atrial flutter     Cancer     lymphoma Large cell B    CHF (congestive heart failure)     Chronic anemia 4/26/2017    Chronic midline low back pain with right-sided sciatica 8/20/2018    Coronary artery disease     01/2015 [facility] patent LCX. 50% stenosis in LAD and RCA.      Depression     Disorder of kidney and ureter     Encounter for blood transfusion     GERD (gastroesophageal reflux disease)     Gout, arthritis     Heart failure     Hx of psychiatric care     Hyperlipidemia     Hypertension     Hypothyroidism     Immune deficiency disorder     Kidney disease     Lung nodule 2014    RML--stable    Obesity     Pacemaker     Metronic    Paroxysmal atrial fibrillation 3/15/2018    Pneumonia     Polyneuropathy     chemo induced  "   Psychiatric problem     Rheumatoid arthritis involving multiple sites with positive rheumatoid factor 2023    Stroke 2020    Tobacco dependence     quit 1976    Trouble in sleeping     Unstable angina 2020       Family History   Problem Relation Age of Onset    Heart disease Mother     Hypertension Mother     Cataracts Mother     Stomach cancer Father         "ulcers that turned to cancer"    Cancer Father         stomach    Pancreatic cancer Sister     Cancer Sister         pancreatic    Leukemia Brother         "leukemia which led to intestinal cancer"    Cataracts Brother     Cancer Brother         leukemia then later stomach cancer    Drug abuse Son     Cancer Son         prostate cancer    Prostate cancer Son     COPD Daughter     Asthma Daughter     Hypertension Maternal Grandfather     Stroke Maternal Grandfather     Alcohol abuse Neg Hx     Diabetes Neg Hx     Intellectual disability Neg Hx     Mental illness Neg Hx        Social History     Socioeconomic History    Marital status:     Number of children: 4   Occupational History    Occupation: Retired   Tobacco Use    Smoking status: Former     Current packs/day: 0.00     Average packs/day: 2.0 packs/day for 6.0 years (12.0 ttl pk-yrs)     Types: Cigarettes     Start date: 3/15/1970     Quit date: 3/15/1976     Years since quittin.8    Smokeless tobacco: Never   Substance and Sexual Activity    Alcohol use: No     Alcohol/week: 0.0 standard drinks of alcohol    Drug use: No    Sexual activity: Not Currently     Partners: Male   Social History Narrative    , lives with . She is a retired . 4 children (3 natural born, 1 adopted)- 2 ; 2x  (currently 17 years); son has prostate cancer, daughter COPD,  cardiac difficulty. She still drives. Does not have a Living will or Advanced Directive.     Social Determinants of Health     Financial Resource Strain: Low Risk  (2021)    Overall " Financial Resource Strain (CARDIA)     Difficulty of Paying Living Expenses: Not hard at all   Food Insecurity: No Food Insecurity (11/2/2021)    Hunger Vital Sign     Worried About Running Out of Food in the Last Year: Never true     Ran Out of Food in the Last Year: Never true   Transportation Needs: No Transportation Needs (11/2/2021)    PRAPARE - Transportation     Lack of Transportation (Medical): No     Lack of Transportation (Non-Medical): No   Physical Activity: Inactive (11/2/2021)    Exercise Vital Sign     Days of Exercise per Week: 0 days     Minutes of Exercise per Session: 0 min   Stress: Stress Concern Present (11/2/2021)    [place] [employer] - Occupational Stress Questionnaire     Feeling of Stress : To some extent   Social Connections: Moderately Integrated (11/2/2021)    Social Connection and Isolation Panel [NHANES]     Frequency of Communication with Friends and Family: Twice a week     Frequency of Social Gatherings with Friends and Family: Once a week     Attends Gnosticism Services: More than 4 times per year     Active Member of Clubs or Organizations: Patient declined     Attends Club or Organization Meetings: Never     Marital Status:    Housing Stability: Low Risk  (11/2/2021)    Housing Stability Vital Sign     Unable to Pay for Housing in the Last Year: No     Number of Places Lived in the Last Year: 1     Unstable Housing in the Last Year: No       Past Surgical History:   Procedure Laterality Date    APPENDECTOMY  1966 approx    CARDIAC PACEMAKER PLACEMENT  01/22/2015    CHOLECYSTECTOMY  1993    incidental at time of gastric bypass    COLON SURGERY Right 2017    hemicolectomy    COLONOSCOPY N/A 4/6/2017    Procedure: COLONOSCOPY;  Surgeon: Tye Enamorado MD;  Location: [facility] ENDO;  Service: Endoscopy;  Laterality: N/A;    COLONOSCOPY N/A 11/28/2018    Procedure: COLONOSCOPY;  Surgeon: Saúl Arthur III, MD;  Location: [facility] ENDO;  Service: Endoscopy;   "Laterality: N/A;    CORONARY ANGIOPLASTY  02/2014    CORONARY STENT PLACEMENT  02/05/2014    ESOPHAGOGASTRODUODENOSCOPY N/A 11/28/2018    Procedure: EGD (ESOPHAGOGASTRODUODENOSCOPY);  Surgeon: Saúl Arthur III, MD;  Location: [facility] ENDO;  Service: Endoscopy;  Laterality: N/A;    GASTRIC BYPASS  1993    with incidental choly    HERNIA REPAIR      IMPLANTATION OF BIVENTRICULAR PERMANENT PACEMAKER AS UPGRADE TO EXISTING PACEMAKER Left 7/13/2023    Procedure: Biventricular pacemaker upgrade/His lead vs CRT-P;  Surgeon: Velasquez Vizcaino MD;  Location: [facility] CATH LAB;  Service: Cardiology;  Laterality: Left;  Will need to upgrade her pacemaker.  Ideally his pacing lead would be the best approach. MDT/ Alternatively, an upgrade /His lead as Plan A  and  CRT if fails/notified MDT rep Arabella  NOT MRI safe   Pacer and leads implanted    INJECTION OF ANESTHETIC AGENT INTO SACROILIAC JOINT Right 10/8/2020    Procedure: Right BLOCK, SACROILIAC JOINT with RN IV sedation;  Surgeon: Madi Anton MD;  Location: [facility];  Service: Pain Management;  Laterality: Right;    JOINT REPLACEMENT Bilateral 2009    3 months apart    TONSILLECTOMY  1959    VENOGRAM, CATH LAB Bilateral 6/29/2023    Procedure: Venogram, Cath Lab;  Surgeon: Velasquez Vizcaino MD;  Location: [facility] CATH LAB;  Service: Cardiology;  Laterality: Bilateral;  1:00PM arrival time  NOT MRI safe   Pacer and leads implanted by [facility]ian 1/22/15   MDTR SEDR01 [place], [id number]   A lead: SAMEER 5076 CapSure Fix Novus, [id number]   RV lead: SAMEER 5076 CapSure Fix Novus, [id number]       Review of Systems      Objective:   Ht 5' 4" (1.626 m)   Wt 58.1 kg (128 lb)   BMI 21.97 kg/m²     Physical Exam  LOWER EXTREMITY PHYSICAL EXAMINATION    VASCULAR: On the left and right foot, the dorsalis pedis pulse is 1/4 and the posterior tibial pulse is 1/4. Capillary refill time is less than 3 seconds. Hair growth is present on the dorsum of the foot and at the " digits. Proximal to distal temperature is warm to cool.    DERMATOLOGY: On the RLE, there is ingrowing of the right foot, lateral nail border of the great toe. The nail is incurvated into the skin of the affected border, causing pains, which are moderate in nature. There is moderate to severe edema. There is no cellulitis noted. There is no drainage. No fluctuance. Granuloma formation is absent. On the LLE, there is ingrowing of the left foot medial nail border of the great toe. The nail is incurvated into the skin of the affected border, causing pains, which are moderate in nature. There is moderate to severe edema. There is no cellulitis noted. There is no drainage. No fluctuance. Granuloma formation is absent.    ORTHOPEDIC: Manual Muscle Testing is 5/5 in all planes on the left and right, without pains, with and without resistance. Gait pattern is slightly antalgic.    Assessment:     1. Ingrowing nail, left great toe    2. Pain of left great toe    3. Ingrowing nail, right great toe    4. Pain of right great toe    5. On continuous oral anticoagulation    6. Chemotherapy-induced neuropathy        Plan:     Ingrowing nail, left great toe    Pain of left great toe    Ingrowing nail, right great toe    Pain of right great toe    On continuous oral anticoagulation    Chemotherapy-induced neuropathy        A TIME-OUT was completed. Oral, written and verbal consent were obtained from patient, prior to procedure being performed as discussed below.    The B/L and 1st toe was/were anesthetized with a total of 3cc-5cc of a mixture of Lidocaine w/ Epinephrine and Marcaine w/o Epinephrine.    The area was then prepped appropriately with betadine paint. Following this, a [place] Elevator was utilized to free up the offending nail border, from the surrounding soft tissues.  Next, an English Anvil was used to split the nail from distal to proximal. Once freed from the soft tissue structures at the of the offending nail fold, a  Curved Hemostat was used to remove the offending portion of nail.  A curette was then utilized to remove and and all debris from the border of the nail. Antibiotic cream and a sterile bandage with Coban was placed on the digit.      Patient was informed of the possibility of recurrence of an ingrowing nail. Patient was given written and oral instructions for care including the office phone number if any questions or concerns arise.      Patient to start daily application of topical ABx. ointment with a bandage.     Patient to follow up in approx. 10 to 14 days, if needed.          Future Appointments   Date Time Provider Department Center   1/2/2024  3:10 PM INTERNAL MEDICINE NURSE, ON [facility] IM [facility]   1/4/2024  1:20 PM Nader Gil MD ON VAS CAR [facility]   4/24/2024 10:00 AM LABORATORY, [facility] LAB [facility]   4/24/2024 10:45 AM Dilshad Rogers MD [facility] RHEUM [facility]   4/24/2024 11:30 AM INJECTION 1, [facility] INFUSION [facility] INFSN [facility]   5/13/2024  9:30 AM PACEMAKER CLINIC, [facility] ARRHYTHMIA ON[facility] ARR PRO [facility]   6/19/2024  9:10 AM LABORATORY, [facility]   6/25/2024  3:20 PM Marti Coronel MD ON IM [facility]   10/28/2024  1:20 PM Velasquez Vizcaino MD ON ARR [facility]   10/28/2024  1:30 PM PACEMAKER CLINIC, ON ARRHYTHMIA ONH ARR PRO [facility]

## 2024-01-02 NOTE — PROGRESS NOTES
Myrna PARADA discussed nurse visit with me.  Advised to add amlodipine 5 mg daily and schedule for follow up with PC team in 2 weeks.  Advised to give patient stroke precautions.  She expressed understanding and advised patient.

## 2024-01-02 NOTE — PATIENT INSTRUCTIONS
Patient is here for a nurse visit for blood pressure.  Patient used 2 identifiers (first&last name spelled and ).  Patient denies dizziness, and blurred vision.  She is having headaches.  Patient has not been check bp at home.  She has had medication this morning.  /100 P 65  Second bp 170/100         Patient has an appointment with heart doctor on Thursday.

## 2024-01-03 DIAGNOSIS — E03.9 HYPOTHYROIDISM (ACQUIRED): Primary | ICD-10-CM

## 2024-01-04 ENCOUNTER — OFFICE VISIT (OUTPATIENT)
Dept: CARDIOLOGY | Facility: CLINIC | Age: 84
End: 2024-01-04
Payer: MEDICARE

## 2024-01-04 VITALS
BODY MASS INDEX: 21.53 KG/M2 | HEART RATE: 69 BPM | HEIGHT: 64 IN | SYSTOLIC BLOOD PRESSURE: 128 MMHG | DIASTOLIC BLOOD PRESSURE: 77 MMHG | WEIGHT: 126.13 LBS

## 2024-01-04 DIAGNOSIS — G62.0 CHEMOTHERAPY-INDUCED NEUROPATHY: Chronic | ICD-10-CM

## 2024-01-04 DIAGNOSIS — Z95.0 CARDIAC PACEMAKER IN SITU: ICD-10-CM

## 2024-01-04 DIAGNOSIS — I25.5 ISCHEMIC CARDIOMYOPATHY: Chronic | ICD-10-CM

## 2024-01-04 DIAGNOSIS — C83.30 DIFFUSE LARGE B-CELL LYMPHOMA, UNSPECIFIED BODY REGION: ICD-10-CM

## 2024-01-04 DIAGNOSIS — I50.32 CHRONIC DIASTOLIC HEART FAILURE: ICD-10-CM

## 2024-01-04 DIAGNOSIS — I48.0 PAROXYSMAL ATRIAL FIBRILLATION: ICD-10-CM

## 2024-01-04 DIAGNOSIS — D64.9 CHRONIC ANEMIA: Chronic | ICD-10-CM

## 2024-01-04 DIAGNOSIS — T45.1X5A CHEMOTHERAPY-INDUCED NEUROPATHY: Chronic | ICD-10-CM

## 2024-01-04 DIAGNOSIS — R94.2 DIFFUSION CAPACITY OF LUNG (DL), DECREASED: ICD-10-CM

## 2024-01-04 DIAGNOSIS — I25.10 CORONARY ARTERY DISEASE INVOLVING NATIVE CORONARY ARTERY OF NATIVE HEART WITHOUT ANGINA PECTORIS: Primary | Chronic | ICD-10-CM

## 2024-01-04 DIAGNOSIS — R60.0 PEDAL EDEMA: ICD-10-CM

## 2024-01-04 DIAGNOSIS — Z96.653 S/P TKR (TOTAL KNEE REPLACEMENT), BILATERAL: Chronic | ICD-10-CM

## 2024-01-04 DIAGNOSIS — E78.2 MIXED HYPERLIPIDEMIA: Chronic | ICD-10-CM

## 2024-01-04 DIAGNOSIS — I50.22 CHRONIC SYSTOLIC CONGESTIVE HEART FAILURE: ICD-10-CM

## 2024-01-04 DIAGNOSIS — I50.43 ACUTE ON CHRONIC COMBINED SYSTOLIC AND DIASTOLIC CONGESTIVE HEART FAILURE: ICD-10-CM

## 2024-01-04 DIAGNOSIS — I70.0 ATHEROSCLEROSIS OF AORTA: ICD-10-CM

## 2024-01-04 DIAGNOSIS — N18.32 STAGE 3B CHRONIC KIDNEY DISEASE: ICD-10-CM

## 2024-01-04 DIAGNOSIS — I25.118 ATHEROSCLEROSIS OF NATIVE CORONARY ARTERY OF NATIVE HEART WITH STABLE ANGINA PECTORIS: ICD-10-CM

## 2024-01-04 DIAGNOSIS — Z98.84 S/P GASTRIC BYPASS: Chronic | ICD-10-CM

## 2024-01-04 DIAGNOSIS — E03.9 HYPOTHYROIDISM (ACQUIRED): Chronic | ICD-10-CM

## 2024-01-04 DIAGNOSIS — I10 ESSENTIAL HYPERTENSION: Chronic | ICD-10-CM

## 2024-01-04 PROCEDURE — 1157F ADVNC CARE PLAN IN RCRD: CPT | Mod: HCNC,CPTII,S$GLB, | Performed by: INTERNAL MEDICINE

## 2024-01-04 PROCEDURE — 3288F FALL RISK ASSESSMENT DOCD: CPT | Mod: HCNC,CPTII,S$GLB, | Performed by: INTERNAL MEDICINE

## 2024-01-04 PROCEDURE — 99215 OFFICE O/P EST HI 40 MIN: CPT | Mod: HCNC,S$GLB,, | Performed by: INTERNAL MEDICINE

## 2024-01-04 PROCEDURE — 3074F SYST BP LT 130 MM HG: CPT | Mod: HCNC,CPTII,S$GLB, | Performed by: INTERNAL MEDICINE

## 2024-01-04 PROCEDURE — 3078F DIAST BP <80 MM HG: CPT | Mod: HCNC,CPTII,S$GLB, | Performed by: INTERNAL MEDICINE

## 2024-01-04 PROCEDURE — 1125F AMNT PAIN NOTED PAIN PRSNT: CPT | Mod: HCNC,CPTII,S$GLB, | Performed by: INTERNAL MEDICINE

## 2024-01-04 PROCEDURE — 1100F PTFALLS ASSESS-DOCD GE2>/YR: CPT | Mod: HCNC,CPTII,S$GLB, | Performed by: INTERNAL MEDICINE

## 2024-01-04 PROCEDURE — 1159F MED LIST DOCD IN RCRD: CPT | Mod: HCNC,CPTII,S$GLB, | Performed by: INTERNAL MEDICINE

## 2024-01-04 PROCEDURE — 99999 PR PBB SHADOW E&M-EST. PATIENT-LVL IV: CPT | Mod: PBBFAC,HCNC,, | Performed by: INTERNAL MEDICINE

## 2024-01-04 RX ORDER — OLMESARTAN MEDOXOMIL 20 MG/1
20 TABLET ORAL DAILY
Qty: 90 TABLET | Refills: 3 | Status: SHIPPED | OUTPATIENT
Start: 2024-01-04 | End: 2025-01-03

## 2024-01-04 RX ORDER — NITROGLYCERIN 40 MG/1
1 PATCH TRANSDERMAL DAILY
Qty: 30 PATCH | Refills: 11 | Status: SHIPPED | OUTPATIENT
Start: 2024-01-04 | End: 2024-01-23 | Stop reason: SINTOL

## 2024-01-04 RX ORDER — LEVOTHYROXINE SODIUM 100 UG/1
100 TABLET ORAL
Qty: 90 TABLET | Refills: 3 | Status: SHIPPED | OUTPATIENT
Start: 2024-01-04 | End: 2025-01-03

## 2024-01-04 RX ORDER — PRAVASTATIN SODIUM 40 MG/1
40 TABLET ORAL DAILY
Qty: 90 TABLET | Refills: 3 | Status: SHIPPED | OUTPATIENT
Start: 2024-01-04

## 2024-01-04 NOTE — PROGRESS NOTES
"Subjective:   Patient ID:  Violet Swenson is a 83 y.o. female who presents for follow up of No chief complaint on file.      HPI  9/17/2020   79 yo female with ;ymphoma  S/p pcae cad s/p stent ckd heart failure ios here for f/u she has low back pain limited has had recurrent episodes of intrascapular pain radiating to left arm feels like muscle spasm none with exertion. No chf symptoms no leg swelling tia claudication chest pain syncope near syncope/.no palpitation. Has some improvement in appetite  .  11/16  Violet Swenson is a 80 y.o. female patient with a h/o anemia, anxiety, atrial flutter, lymphoma large cell B, CHF, CAD, depression, GERD, gout, heart failure, HLD, HTN, hypothyroidism kidney disease, lung nodule, obesity, metronic pacemaker, polyneuropathy, who presents to the Emergency Department for evaluation of fatigue which onset this morning. Per [facility], the pt has been intermittently stuttering her speech and was hypoglycemic upon arriving on scene. Pt's daughter states that the pt woke up and was chatting to her before she left the house around 0800 this morning. The pt reports falling back asleep and waking up around 0930 feeling very weak and fatigued. She states, "when I rolled over after waking up, it felt like someone was pushing me back in bed." Associated sxs include R arm weakness, generalized weakness, slurred speech, and trouble ambulating. Patient denies any fever, SOB, CP, n/v, numbness, dizziness, and all other sxs at this time. In the ED, H/H 8.9/28.6, Creatinine 1.5, , TSH 4.087, CT head with no acute findings. Tele-stroke recommended MRI/MRA brain, MRA neck lipid panel, hemoglobin A1c. Add ASA to Plavix if x 30 days if no contraindication. Atorvastatin 40 mg daily. Neurology, PT/speech consults. Patient placed in observation for CVA rule out under the care of hospital medicine.      * No surgery found *       Hospital Course:   Place an observation for " evaluation and treatment of right arm weakness, slurred speech, and gait instability. Symptoms concerning for acute stroke. Initially perform a CT of the head which showed no acute findings. Patient was unable to perform an MRI or MRA due to having an implantable device. Empirically treated for transient ischemic attack versus stroke. Performed an interval CT scan of the head which was also negative. She was evaluated by neurology who assisted with management. Neurology determined that even though we were unable to get positive imaging findings that she should be treated as likely having had an acute stroke.  Physical and occupational therapy evaluated the patient and recommended outpatient therapy. Speech language pathology evaluated the patient and recommended no restrictions. Discharge plan to return home and continue medication plan outpatient physical therapy and follow-up with primary care as needed         11/27/2020  Had chest pain since yesterday it is tight all over chest no associated shortness of breath. Her bp is elevated she cannot get comfortable feels like tightness . Has no leg swelling her speech and vision improved but he rrt sided weakness still evident she is taking her antiplatelet. She has not taken her meds this  Morning. She is not feeling good during the vist the pain has been ongoing since 4 pm yesterday. I gave her a s/l nitro and she improved.      12/14/2020     Here for f/u after hospital admit. She r/o for mi had cardiolite negative for ischemia. Her medical therapy was adjusted taken off gabapentin she has her aches back all over . She is not able to sleep at nite. No further cardiac symptomatology.     6/7/2021  Here for f/u her bp was elevated she ate salt with watermelon has no new complaints of leg swelling shortness of breath tia claudication syncope near syncope no blurred vision. She is only n lopressor half pill bid due yo hypotension/.      11/4/2021   Has been having pnd  over the past 2 weeks she wakes up short of breath has to get up move around. Has also some shortness of breath during the day. She has no palpitation. Has  No leg swelling her bp has been elevated. Has  Been compliant with diet. Has continous chest tightness.has no exertional angina. The chest pain is chronic she had is the reason she was sent top er last year. cardiolite was negative.      11/5/2021   Pacer interrogation showed afib since september 20 nitropach added this morning her bp is elevated had her echo today no labs done .she is on asa and plavix no arb      11/24/2021   ADMITTED TO [facility] MEDICAL TEHRAPY ADJUSTED PLACED ON ELIQUIS DIURTETICS HAD SIGNIFICANT MITRAL REGURGITATION HAD HELGA CARDIOVERSION TO SR HAD CARDIOLITE WAS NEGATIVE    HER CHEST PAIN SHORTNESS OF BREATH RESOLVED SHE IS COMPLIANT WITH MEDS HTN CONTROLLED NO DIZZINESS LIGHTHEADEDNESS.      3/3/2022  Here frof /u no new complaints of chest pain shortness of breath palpitation syncope near syncope compliant with meds. Had falls twice uses her walker. No chf symptoms opr angina.     5/8/2023   haD LEG SWELLING NOTHING IN CHANGE OF EATING HABITS.SHE HAS BEEN EVALUATED WITH NEPHROLOGY HEPATOLOGY AND RHEUMATOLOGY  WAS TOLD IT IS CARDIAC SAW DR EL HER LASIX WAS DOUBLED. SHE LOST 17 LBS SHE FEELS MUCH BETTER LEG SWELLING RESOLVED. SHE HAS NO CHEST PAIN. HER EF IS UNCHANGED. SHE STOPPED LYRICAL.     6/22/2023   Has still some residual exertional shortness her weight has been stable her cardiolite is negative she is in afib since 10/22 she is being followed by ep soon. She is compliant with salt intake.     Update 10/27/2023 :  Continues to have arthritic complaints.  No cardiovascular complaints at this time.  Feels more energetic since pacemaker was implanted.  Still has to mind her sleep schedule whenever she plans to an active day.     I have reviewed the actual image of the ECG tracing obtained today and it shows AF with  complete heart block and his pacing with minimal local nonspecific capture, an apparent HV of 40 milliseconds and clear-cut conduction across the his Purkinje system.     Patient's device (Tri PPM with His lead )was fully evaluated today under my direct supervision and real-time feedback.  Summary of findings are as listed below:  Device is in good repair.   The battery is not near JESSICA.  The his sensing and pacing thresholds are favorable with well maintained safety margins.   In persistent AFib with complete heart block.  There were no device data indicating possible ongoing fluid retention.    Recommendation:  Device follow up as per clinic routine with remote and in house checks       1/4/2024   Here for f/u has multiple social issues selling house lost  logistics of moving seems clinically depressed. Has been having elevated bp was placed on amlodipine has been having leg swelling for 3 weeks before started amlodipine she is not on arb   Has not been taking her nitropach gets intermittent chest pain uses s/l nitro.she is not sure she is taking plavix not taking pravastatin.   Past Medical History:   Diagnosis Date    Age-related osteoporosis without current pathological fracture 8/20/2018    Anemia     Anxiety     Arthritis     Atrial flutter     Cancer     lymphoma Large cell B    CHF (congestive heart failure)     Chronic anemia 4/26/2017    Chronic midline low back pain with right-sided sciatica 8/20/2018    Coronary artery disease     01/2015 [facility] patent LCX. 50% stenosis in LAD and RCA.      Depression     Disorder of kidney and ureter     Encounter for blood transfusion     GERD (gastroesophageal reflux disease)     Gout, arthritis     Heart failure     Hx of psychiatric care     Hyperlipidemia     Hypertension     Hypothyroidism     Immune deficiency disorder     Kidney disease     Lung nodule 2014    RML--stable    Obesity     Pacemaker     Metronic    Paroxysmal atrial fibrillation 3/15/2018     Pneumonia     Polyneuropathy     chemo induced    Psychiatric problem     Rheumatoid arthritis involving multiple sites with positive rheumatoid factor 4/19/2023    Stroke 11/16/2020    Tobacco dependence     quit 1976    Trouble in sleeping     Unstable angina 11/27/2020       Past Surgical History:   Procedure Laterality Date    APPENDECTOMY  1966 approx    CARDIAC PACEMAKER PLACEMENT  01/22/2015    CHOLECYSTECTOMY  1993    incidental at time of gastric bypass    COLON SURGERY Right 2017    hemicolectomy    COLONOSCOPY N/A 4/6/2017    Procedure: COLONOSCOPY;  Surgeon: Tye Enamorado MD;  Location: [facility];  Service: Endoscopy;  Laterality: N/A;    COLONOSCOPY N/A 11/28/2018    Procedure: COLONOSCOPY;  Surgeon: Saúl Arthur III, MD;  Location: [facility];  Service: Endoscopy;  Laterality: N/A;    CORONARY ANGIOPLASTY  02/2014    CORONARY STENT PLACEMENT  02/05/2014    ESOPHAGOGASTRODUODENOSCOPY N/A 11/28/2018    Procedure: EGD (ESOPHAGOGASTRODUODENOSCOPY);  Surgeon: Saúl Arthur III, MD;  Location: [facility];  Service: Endoscopy;  Laterality: N/A;    GASTRIC BYPASS  1993    with incidental choly    HERNIA REPAIR      IMPLANTATION OF BIVENTRICULAR PERMANENT PACEMAKER AS UPGRADE TO EXISTING PACEMAKER Left 7/13/2023    Procedure: Biventricular pacemaker upgrade/His lead vs CRT-P;  Surgeon: Velasquez Vizcaino MD;  Location: [facility] CATH LAB;  Service: Cardiology;  Laterality: Left;  Will need to upgrade her pacemaker.  Ideally his pacing lead would be the best approach. MDT/ Alternatively, an upgrade /His lead as Plan A  and  CRT if fails/notified MDT rep Mell and Arik  NOT MRI safe   Pacer and leads implanted    INJECTION OF ANESTHETIC AGENT INTO SACROILIAC JOINT Right 10/8/2020    Procedure: Right BLOCK, SACROILIAC JOINT with RN IV sedation;  Surgeon: Madi Anton MD;  Location: [facility];  Service: Pain Management;  Laterality: Right;    JOINT REPLACEMENT Bilateral 2009    3 months apart     "TONSILLECTOMY      VENOGRAM, CATH LAB Bilateral 2023    Procedure: Venogram, Cath Lab;  Surgeon: Velasquez Vizcaino MD;  Location: [facility] CATH LAB;  Service: Cardiology;  Laterality: Bilateral;  1:00PM arrival time  NOT MRI safe   Pacer and leads implanted by [facility], 1/22/15   MDTR [id number] Sensia, [id number]   A lead: SAMEER 5076 CapSure Fix Novus, [id number]   RV lead: SAMEER 5076 CapSure Fix Novus, [id number]       Social History     Tobacco Use    Smoking status: Former     Current packs/day: 0.00     Average packs/day: 2.0 packs/day for 6.0 years (12.0 ttl pk-yrs)     Types: Cigarettes     Start date: 3/15/1970     Quit date: 3/15/1976     Years since quittin.8    Smokeless tobacco: Never   Substance Use Topics    Alcohol use: No     Alcohol/week: 0.0 standard drinks of alcohol    Drug use: No       Family History   Problem Relation Age of Onset    Heart disease Mother     Hypertension Mother     Cataracts Mother     Stomach cancer Father         "ulcers that turned to cancer"    Cancer Father         stomach    Pancreatic cancer Sister     Cancer Sister         pancreatic    Leukemia Brother         "leukemia which led to intestinal cancer"    Cataracts Brother     Cancer Brother         leukemia then later stomach cancer    Drug abuse Son     Cancer Son         prostate cancer    Prostate cancer Son     COPD Daughter     Asthma Daughter     Hypertension Maternal Grandfather     Stroke Maternal Grandfather     Alcohol abuse Neg Hx     Diabetes Neg Hx     Intellectual disability Neg Hx     Mental illness Neg Hx        Current Outpatient Medications   Medication Sig    acetaminophen (TYLENOL ARTHRITIS PAIN) 650 MG TbSR Take 650 mg by mouth every 8 (eight) hours.    allopurinoL (ZYLOPRIM) 300 MG tablet Take 1 tablet (300 mg total) by mouth once daily.    amLODIPine (NORVASC) 5 MG tablet Take 1 tablet (5 mg total) by mouth once daily.    ascorbic acid, vitamin C, (VITAMIN C) 100 MG tablet Take by mouth " once daily.    aspirin (ECOTRIN) 81 MG EC tablet Take 81 mg by mouth nightly.     azelastine (ASTELIN) 137 mcg (0.1 %) nasal spray 1 spray (137 mcg total) by Nasal route 2 (two) times daily.    busPIRone (BUSPAR) 7.5 MG tablet Take 1 tablet (7.5 mg total) by mouth 2 (two) times daily.    calcitRIOL (ROCALTROL) 0.25 MCG Cap TAKE ONE CAPSULE BY MOUTH EVERY MONDAY, WEDNESDAY AND FRIDAY    celecoxib (CELEBREX) 100 MG capsule Take 1 capsule (100 mg total) by mouth 2 (two) times daily.    clopidogreL (PLAVIX) 75 mg tablet TAKE ONE TABLET BY MOUTH EVERY DAY    diclofenac sodium 1 % Gel Apply 2 g topically once daily. Apply 2 g over painful joints once or twice a day.    DIPRIVAN 10 mg/mL infusion     DULoxetine (CYMBALTA) 30 MG capsule TAKE ONE CAPSULE BY MOUTH EVERY DAY    ELIQUIS 2.5 mg Tab TAKE ONE TABLET BY MOUTH TWICE DAILY    fluticasone propionate (FLONASE) 50 mcg/actuation nasal spray 2 sprays (100 mcg total) by Each Nostril route once daily.    furosemide (LASIX) 40 MG tablet Take 1 tablet (40 mg total) by mouth once daily. 1 every day, then 2 tablets every other day    glucosamine-D3-Boswellia serr (OSTEO BI-FLEX, 5-LOXIN,) 1,500-400-100 mg-unit-mg Tab Take by mouth.    hydroxychloroquine (PLAQUENIL) 200 mg tablet Take 1 tablet (200 mg total) by mouth every other day.    iron-vitamin C 100-250 mg, ICAR-C, 100-250 mg Tab TAKE ONE TABLET BY MOUTH EVERY DAY    levocetirizine (XYZAL) 5 MG tablet Take 1 tablet (5 mg total) by mouth every evening.    levothyroxine (SYNTHROID) 75 MCG tablet Take 1 tablet (75 mcg total) by mouth before breakfast.    metoprolol tartrate (LOPRESSOR) 25 MG tablet TAKE ONE TABLET BY MOUTH TWICE DAILY    multivitamin (THERAGRAN) per tablet Take 1 tablet by mouth once daily.    nitroGLYCERIN 0.2 mg/hr TD PT24 (NITRODUR) 0.2 mg/hr Place 1 patch onto the skin once daily.    pravastatin (PRAVACHOL) 40 MG tablet TAKE ONE TABLET BY MOUTH ONCE DAILY    pregabalin (LYRICA) 50 MG capsule Take 50 mg by  mouth 3 (three) times daily.    sertraline (ZOLOFT) 100 MG tablet Take 1.5 tablets (150 mg total) by mouth once daily.    sodium bicarbonate 650 MG tablet TAKE ONE TABLET BY MOUTH TWICE DAILY    tumeric-ging-olive-oreg-capryl 100 mg-150 mg- 50 mg-150 mg Cap Take by mouth.    VITAMIN D2 1,250 mcg (50,000 unit) capsule TAKE ONE CAPSULE BY MOUTH EVERY 7 DAYS    ZINC ACETATE ORAL Take 250 mg by mouth once daily.      Current Facility-Administered Medications   Medication    denosumab (PROLIA) injection 60 mg     Current Outpatient Medications on File Prior to Visit   Medication Sig    acetaminophen (TYLENOL ARTHRITIS PAIN) 650 MG TbSR Take 650 mg by mouth every 8 (eight) hours.    allopurinoL (ZYLOPRIM) 300 MG tablet Take 1 tablet (300 mg total) by mouth once daily.    amLODIPine (NORVASC) 5 MG tablet Take 1 tablet (5 mg total) by mouth once daily.    ascorbic acid, vitamin C, (VITAMIN C) 100 MG tablet Take by mouth once daily.    aspirin (ECOTRIN) 81 MG EC tablet Take 81 mg by mouth nightly.     azelastine (ASTELIN) 137 mcg (0.1 %) nasal spray 1 spray (137 mcg total) by Nasal route 2 (two) times daily.    busPIRone (BUSPAR) 7.5 MG tablet Take 1 tablet (7.5 mg total) by mouth 2 (two) times daily.    calcitRIOL (ROCALTROL) 0.25 MCG Cap TAKE ONE CAPSULE BY MOUTH EVERY MONDAY, WEDNESDAY AND FRIDAY    celecoxib (CELEBREX) 100 MG capsule Take 1 capsule (100 mg total) by mouth 2 (two) times daily.    clopidogreL (PLAVIX) 75 mg tablet TAKE ONE TABLET BY MOUTH EVERY DAY    diclofenac sodium 1 % Gel Apply 2 g topically once daily. Apply 2 g over painful joints once or twice a day.    DIPRIVAN 10 mg/mL infusion     DULoxetine (CYMBALTA) 30 MG capsule TAKE ONE CAPSULE BY MOUTH EVERY DAY    ELIQUIS 2.5 mg Tab TAKE ONE TABLET BY MOUTH TWICE DAILY    fluticasone propionate (FLONASE) 50 mcg/actuation nasal spray 2 sprays (100 mcg total) by Each Nostril route once daily.    furosemide (LASIX) 40 MG tablet Take 1 tablet (40 mg total) by  mouth once daily. 1 every day, then 2 tablets every other day    glucosamine-D3-Boswellia serr (OSTEO BI-FLEX, 5-LOXIN,) 1,500-400-100 mg-unit-mg Tab Take by mouth.    hydroxychloroquine (PLAQUENIL) 200 mg tablet Take 1 tablet (200 mg total) by mouth every other day.    iron-vitamin C 100-250 mg, ICAR-C, 100-250 mg Tab TAKE ONE TABLET BY MOUTH EVERY DAY    levocetirizine (XYZAL) 5 MG tablet Take 1 tablet (5 mg total) by mouth every evening.    levothyroxine (SYNTHROID) 75 MCG tablet Take 1 tablet (75 mcg total) by mouth before breakfast.    metoprolol tartrate (LOPRESSOR) 25 MG tablet TAKE ONE TABLET BY MOUTH TWICE DAILY    multivitamin (THERAGRAN) per tablet Take 1 tablet by mouth once daily.    nitroGLYCERIN 0.2 mg/hr TD PT24 (NITRODUR) 0.2 mg/hr Place 1 patch onto the skin once daily.    pravastatin (PRAVACHOL) 40 MG tablet TAKE ONE TABLET BY MOUTH ONCE DAILY    pregabalin (LYRICA) 50 MG capsule Take 50 mg by mouth 3 (three) times daily.    sertraline (ZOLOFT) 100 MG tablet Take 1.5 tablets (150 mg total) by mouth once daily.    sodium bicarbonate 650 MG tablet TAKE ONE TABLET BY MOUTH TWICE DAILY    tumeric-ging-olive-oreg-capryl 100 mg-150 mg- 50 mg-150 mg Cap Take by mouth.    VITAMIN D2 1,250 mcg (50,000 unit) capsule TAKE ONE CAPSULE BY MOUTH EVERY 7 DAYS    ZINC ACETATE ORAL Take 250 mg by mouth once daily.      Current Facility-Administered Medications on File Prior to Visit   Medication    denosumab (PROLIA) injection 60 mg     Review of patient's allergies indicates:   Allergen Reactions    Corticosteroids (glucocorticoids) Nausea Only and Other (See Comments)     Stomach pain, dizziness, headache    Oxycodone Other (See Comments)     Blood pressure dropped      Review of Systems   Constitutional: Negative for diaphoresis, malaise/fatigue and weight gain.   HENT:  Negative for hoarse voice.    Eyes:  Negative for double vision and visual disturbance.   Cardiovascular:  Positive for chest pain, dyspnea on  exertion and leg swelling. Negative for claudication, cyanosis, irregular heartbeat, near-syncope, orthopnea, palpitations, paroxysmal nocturnal dyspnea and syncope.   Respiratory:  Negative for cough, hemoptysis, shortness of breath and snoring.    Hematologic/Lymphatic: Negative for bleeding problem. Does not bruise/bleed easily.   Skin:  Negative for color change and poor wound healing.   Musculoskeletal:  Negative for muscle cramps, muscle weakness and myalgias.   Gastrointestinal:  Negative for bloating, abdominal pain, change in bowel habit, diarrhea, heartburn, hematemesis, hematochezia, melena and nausea.   Neurological:  Negative for excessive daytime sleepiness, dizziness, headaches, light-headedness, loss of balance, numbness and weakness.   Psychiatric/Behavioral:  Negative for memory loss. The patient does not have insomnia.    Allergic/Immunologic: Negative for hives.       Objective:   Physical Exam  Constitutional:       General: She is not in acute distress.     Appearance: Normal appearance. She is well-developed. She is not ill-appearing.   HENT:      Head: Normocephalic and atraumatic.   Eyes:      General: No scleral icterus.     Pupils: Pupils are equal, round, and reactive to light.   Neck:      Thyroid: No thyromegaly.      Vascular: Normal carotid pulses. No carotid bruit, hepatojugular reflux or JVD.      Trachea: No tracheal deviation.   Cardiovascular:      Rate and Rhythm: Normal rate and regular rhythm.      Pulses: Normal pulses.      Heart sounds: Normal heart sounds. No murmur heard.     No friction rub. No gallop.   Pulmonary:      Effort: Pulmonary effort is normal. No respiratory distress.      Breath sounds: Normal breath sounds. No wheezing, rhonchi or rales.   Chest:      Chest wall: No tenderness.   Abdominal:      General: Bowel sounds are normal. There is no abdominal bruit.      Palpations: Abdomen is soft. There is no hepatomegaly or pulsatile mass.      Tenderness: There  "is no abdominal tenderness.   Musculoskeletal:      Right shoulder: No deformity.      Cervical back: Normal range of motion and neck supple.      Right lower leg: No edema.      Left lower leg: No edema.   Skin:     General: Skin is warm and dry.      Findings: No erythema or rash.      Nails: There is no clubbing.   Neurological:      General: No focal deficit present.      Mental Status: She is alert and oriented to person, place, and time.      Cranial Nerves: No cranial nerve deficit.      Coordination: Coordination normal.   Psychiatric:         Mood and Affect: Mood normal.         Speech: Speech normal.         Behavior: Behavior normal.       Vitals:    01/04/24 1321 01/04/24 1323   BP: 129/85 128/77   BP Location: Right arm Left arm   Patient Position: Sitting Sitting   BP Method: Medium (Automatic) Medium (Automatic)   Pulse: 69    Weight: 57.2 kg (126 lb 1.7 oz)    Height: 5' 4" (1.626 m)      Lab Results   Component Value Date    CHOL 164 12/28/2023    CHOL 139 06/16/2023    CHOL 153 03/30/2022      Body mass index is 21.65 kg/m².   Lab Results   Component Value Date    HGBA1C 4.9 11/16/2020      BMP  Lab Results   Component Value Date     12/28/2023    K 4.5 12/28/2023     12/28/2023    CO2 23 12/28/2023    BUN 20 12/28/2023    CREATININE 1.4 12/28/2023    CALCIUM 8.8 12/28/2023    ANIONGAP 8 12/28/2023    EGFRNORACEVR 37.6 (A) 12/28/2023      Lab Results   Component Value Date    HDL 62 12/28/2023    HDL 63 06/16/2023    HDL 67 03/30/2022     Lab Results   Component Value Date    LDLCALC 74.2 12/28/2023    LDLCALC 56.6 (L) 06/16/2023    LDLCALC 69.8 03/30/2022     Lab Results   Component Value Date    TRIG 139 12/28/2023    TRIG 97 06/16/2023    TRIG 81 03/30/2022     Lab Results   Component Value Date    CHOLHDL 37.8 12/28/2023    CHOLHDL 45.3 06/16/2023    CHOLHDL 43.8 03/30/2022       Chemistry        Component Value Date/Time     12/28/2023 0838    K 4.5 12/28/2023 0838     " 12/28/2023 0838    CO2 23 12/28/2023 0838    BUN 20 12/28/2023 0838    CREATININE 1.4 12/28/2023 0838    GLU 83 12/28/2023 0838        Component Value Date/Time    CALCIUM 8.8 12/28/2023 0838    ALKPHOS 48 (L) 12/28/2023 0838    AST 39 12/28/2023 0838    ALT 39 12/28/2023 0838    BILITOT 0.4 12/28/2023 0838    ESTGFRAFRICA 35 (A) 05/31/2022 0909    EGFRNONAA 30 (A) 05/31/2022 0909          Lab Results   Component Value Date    TSH 11.062 (H) 12/28/2023     Lab Results   Component Value Date    INR 1.0 06/23/2023    INR 1.0 04/03/2023    INR 0.9 11/07/2021     Lab Results   Component Value Date    WBC 7.24 12/28/2023    HGB 11.3 (L) 12/28/2023    HCT 36.1 (L) 12/28/2023     (H) 12/28/2023     12/28/2023     BMP  Sodium   Date Value Ref Range Status   12/28/2023 141 136 - 145 mmol/L Final     Potassium   Date Value Ref Range Status   12/28/2023 4.5 3.5 - 5.1 mmol/L Final     Chloride   Date Value Ref Range Status   12/28/2023 110 95 - 110 mmol/L Final     CO2   Date Value Ref Range Status   12/28/2023 23 23 - 29 mmol/L Final     BUN   Date Value Ref Range Status   12/28/2023 20 8 - 23 mg/dL Final     Creatinine   Date Value Ref Range Status   12/28/2023 1.4 0.5 - 1.4 mg/dL Final     Calcium   Date Value Ref Range Status   12/28/2023 8.8 8.7 - 10.5 mg/dL Final     Anion Gap   Date Value Ref Range Status   12/28/2023 8 8 - 16 mmol/L Final     eGFR if    Date Value Ref Range Status   05/31/2022 35 (A) >60 mL/min/1.73 m^2 Final     eGFR if non    Date Value Ref Range Status   05/31/2022 30 (A) >60 mL/min/1.73 m^2 Final     Comment:     Calculation used to obtain the estimated glomerular filtration  rate (eGFR) is the CKD-EPI equation.        CrCl cannot be calculated (Patient's most recent lab result is older than the maximum 7 days allowed.).    Assessment:     1. Coronary artery disease : multiple vessels, s/p PTCA    2. Paroxysmal atrial fibrillation    3. Chronic systolic  congestive heart failure    4. Atherosclerosis of aorta    5. Acute on chronic combined systolic and diastolic congestive heart failure    6. Essential hypertension    7. Mixed hyperlipidemia    8. Hypothyroidism (acquired)    9. Chronic anemia    10. S/P gastric bypass    11. Chemotherapy-induced neuropathy    12. S/P TKR (total knee replacement), bilateral    13. Diffuse large B-cell lymphoma, unspecified body region    14. Diffusion capacity of lung (dl), decreased    15. Stage 3b chronic kidney disease    16. Cardiac pacemaker in situ    17. Chronic diastolic heart failure    18. Pedal edema      S/p pacer feels better after adjusting ng lead. Has chronic afib and complete block  has been compliant with eliquis. Will continue same    Afib on eliquis continue same    Chf appears compensated but her medical therapy was not appropriate readjusted meds counseled about compliance with salt    Cad with occasional angina not on nitrates will resume patches.   Htn was not controlled low salt diet emphasized adjust therapy with arb stop amlodipine  Has pedal edema off lasix will resume and stop amlodipine.   Hypothyroidism not therapeutic will increase synthroid and repeat labs.  Hlp not on statins will resume meds .  Plan:   As per above medical therapy adjusted will check in 6 weeks with bmp bnp tsh.

## 2024-01-10 RX ORDER — CALCITRIOL 0.25 UG/1
CAPSULE ORAL
Qty: 12 CAPSULE | Refills: 3 | Status: SHIPPED | OUTPATIENT
Start: 2024-01-10

## 2024-01-12 DIAGNOSIS — D50.8 OTHER IRON DEFICIENCY ANEMIA: ICD-10-CM

## 2024-01-12 RX ORDER — IRON,CARBONYL/ASCORBIC ACID 100-250 MG
TABLET ORAL
Qty: 30 TABLET | Refills: 4 | Status: SHIPPED | OUTPATIENT
Start: 2024-01-12

## 2024-01-18 DIAGNOSIS — F41.9 ANXIETY: ICD-10-CM

## 2024-01-19 DIAGNOSIS — F41.9 INSOMNIA SECONDARY TO ANXIETY: ICD-10-CM

## 2024-01-19 DIAGNOSIS — F32.9 MAJOR DEPRESSION, CHRONIC: ICD-10-CM

## 2024-01-19 DIAGNOSIS — F41.8 SITUATIONAL ANXIETY: ICD-10-CM

## 2024-01-19 DIAGNOSIS — F51.05 INSOMNIA SECONDARY TO ANXIETY: ICD-10-CM

## 2024-01-19 DIAGNOSIS — J30.9 ALLERGIC RHINITIS, UNSPECIFIED SEASONALITY, UNSPECIFIED TRIGGER: ICD-10-CM

## 2024-01-19 RX ORDER — LEVOCETIRIZINE DIHYDROCHLORIDE 5 MG/1
5 TABLET, FILM COATED ORAL NIGHTLY
Qty: 90 TABLET | Refills: 3 | Status: SHIPPED | OUTPATIENT
Start: 2024-01-19

## 2024-01-19 RX ORDER — BUSPIRONE HYDROCHLORIDE 7.5 MG/1
7.5 TABLET ORAL 2 TIMES DAILY
Qty: 60 TABLET | Refills: 5 | Status: SHIPPED | OUTPATIENT
Start: 2024-01-19 | End: 2024-07-17

## 2024-01-19 RX ORDER — SERTRALINE HYDROCHLORIDE 100 MG/1
150 TABLET, FILM COATED ORAL DAILY
Qty: 135 TABLET | Refills: 3 | Status: SHIPPED | OUTPATIENT
Start: 2024-01-19 | End: 2025-01-18

## 2024-01-22 ENCOUNTER — LAB VISIT (OUTPATIENT)
Dept: LAB | Facility: HOSPITAL | Age: 84
End: 2024-01-22
Attending: INTERNAL MEDICINE
Payer: MEDICARE

## 2024-01-22 DIAGNOSIS — E03.9 HYPOTHYROIDISM (ACQUIRED): Chronic | ICD-10-CM

## 2024-01-22 DIAGNOSIS — I50.22 CHRONIC SYSTOLIC CONGESTIVE HEART FAILURE: ICD-10-CM

## 2024-01-22 LAB
ANION GAP SERPL CALC-SCNC: 10 MMOL/L (ref 8–16)
BNP SERPL-MCNC: 2086 PG/ML (ref 0–99)
BUN SERPL-MCNC: 25 MG/DL (ref 8–23)
CALCIUM SERPL-MCNC: 9.4 MG/DL (ref 8.7–10.5)
CHLORIDE SERPL-SCNC: 113 MMOL/L (ref 95–110)
CO2 SERPL-SCNC: 18 MMOL/L (ref 23–29)
CREAT SERPL-MCNC: 1.3 MG/DL (ref 0.5–1.4)
EST. GFR  (NO RACE VARIABLE): 41.1 ML/MIN/1.73 M^2
GLUCOSE SERPL-MCNC: 78 MG/DL (ref 70–110)
POTASSIUM SERPL-SCNC: 4.7 MMOL/L (ref 3.5–5.1)
SODIUM SERPL-SCNC: 141 MMOL/L (ref 136–145)
TSH SERPL DL<=0.005 MIU/L-ACNC: 5.25 UIU/ML (ref 0.4–4)

## 2024-01-22 PROCEDURE — 36415 COLL VENOUS BLD VENIPUNCTURE: CPT | Mod: HCNC | Performed by: INTERNAL MEDICINE

## 2024-01-22 PROCEDURE — 84439 ASSAY OF FREE THYROXINE: CPT | Mod: HCNC | Performed by: INTERNAL MEDICINE

## 2024-01-22 PROCEDURE — 83880 ASSAY OF NATRIURETIC PEPTIDE: CPT | Mod: HCNC | Performed by: INTERNAL MEDICINE

## 2024-01-22 PROCEDURE — 84443 ASSAY THYROID STIM HORMONE: CPT | Mod: HCNC | Performed by: INTERNAL MEDICINE

## 2024-01-22 PROCEDURE — 80048 BASIC METABOLIC PNL TOTAL CA: CPT | Mod: HCNC | Performed by: INTERNAL MEDICINE

## 2024-01-23 ENCOUNTER — OFFICE VISIT (OUTPATIENT)
Dept: CARDIOLOGY | Facility: CLINIC | Age: 84
End: 2024-01-23
Payer: MEDICARE

## 2024-01-23 ENCOUNTER — TELEPHONE (OUTPATIENT)
Dept: CARDIOLOGY | Facility: CLINIC | Age: 84
End: 2024-01-23

## 2024-01-23 VITALS
OXYGEN SATURATION: 95 % | DIASTOLIC BLOOD PRESSURE: 86 MMHG | HEART RATE: 75 BPM | SYSTOLIC BLOOD PRESSURE: 150 MMHG | HEIGHT: 64 IN | WEIGHT: 133.81 LBS | BODY MASS INDEX: 22.85 KG/M2

## 2024-01-23 DIAGNOSIS — M05.79 RHEUMATOID ARTHRITIS INVOLVING MULTIPLE SITES WITH POSITIVE RHEUMATOID FACTOR: ICD-10-CM

## 2024-01-23 DIAGNOSIS — I25.118 ATHEROSCLEROSIS OF NATIVE CORONARY ARTERY OF NATIVE HEART WITH STABLE ANGINA PECTORIS: ICD-10-CM

## 2024-01-23 DIAGNOSIS — I50.22 CHRONIC SYSTOLIC CONGESTIVE HEART FAILURE: ICD-10-CM

## 2024-01-23 DIAGNOSIS — I25.10 CORONARY ARTERY DISEASE INVOLVING NATIVE CORONARY ARTERY OF NATIVE HEART WITHOUT ANGINA PECTORIS: Chronic | ICD-10-CM

## 2024-01-23 DIAGNOSIS — I95.1 POSTURAL HYPOTENSION: ICD-10-CM

## 2024-01-23 DIAGNOSIS — I25.5 ISCHEMIC CARDIOMYOPATHY: Primary | Chronic | ICD-10-CM

## 2024-01-23 DIAGNOSIS — I70.0 ATHEROSCLEROSIS OF AORTA: ICD-10-CM

## 2024-01-23 DIAGNOSIS — Z95.0 CARDIAC PACEMAKER IN SITU: ICD-10-CM

## 2024-01-23 DIAGNOSIS — I10 ESSENTIAL HYPERTENSION: Chronic | ICD-10-CM

## 2024-01-23 DIAGNOSIS — E78.2 MIXED HYPERLIPIDEMIA: Chronic | ICD-10-CM

## 2024-01-23 DIAGNOSIS — N18.32 STAGE 3B CHRONIC KIDNEY DISEASE: ICD-10-CM

## 2024-01-23 LAB — T4 FREE SERPL-MCNC: 0.81 NG/DL (ref 0.71–1.51)

## 2024-01-23 PROCEDURE — 1157F ADVNC CARE PLAN IN RCRD: CPT | Mod: HCNC,CPTII,S$GLB, | Performed by: PHYSICIAN ASSISTANT

## 2024-01-23 PROCEDURE — 1125F AMNT PAIN NOTED PAIN PRSNT: CPT | Mod: HCNC,CPTII,S$GLB, | Performed by: PHYSICIAN ASSISTANT

## 2024-01-23 PROCEDURE — 3288F FALL RISK ASSESSMENT DOCD: CPT | Mod: HCNC,CPTII,S$GLB, | Performed by: PHYSICIAN ASSISTANT

## 2024-01-23 PROCEDURE — 3077F SYST BP >= 140 MM HG: CPT | Mod: HCNC,CPTII,S$GLB, | Performed by: PHYSICIAN ASSISTANT

## 2024-01-23 PROCEDURE — 1101F PT FALLS ASSESS-DOCD LE1/YR: CPT | Mod: HCNC,CPTII,S$GLB, | Performed by: PHYSICIAN ASSISTANT

## 2024-01-23 PROCEDURE — 3079F DIAST BP 80-89 MM HG: CPT | Mod: HCNC,CPTII,S$GLB, | Performed by: PHYSICIAN ASSISTANT

## 2024-01-23 PROCEDURE — 1159F MED LIST DOCD IN RCRD: CPT | Mod: HCNC,CPTII,S$GLB, | Performed by: PHYSICIAN ASSISTANT

## 2024-01-23 PROCEDURE — 99214 OFFICE O/P EST MOD 30 MIN: CPT | Mod: HCNC,S$GLB,, | Performed by: PHYSICIAN ASSISTANT

## 2024-01-23 PROCEDURE — 99999 PR PBB SHADOW E&M-EST. PATIENT-LVL III: CPT | Mod: PBBFAC,HCNC,, | Performed by: PHYSICIAN ASSISTANT

## 2024-01-23 RX ORDER — AMLODIPINE BESYLATE 5 MG/1
5 TABLET ORAL DAILY
COMMUNITY
Start: 2024-01-19 | End: 2024-01-23

## 2024-01-23 RX ORDER — CLOPIDOGREL BISULFATE 75 MG/1
75 TABLET ORAL DAILY
COMMUNITY
Start: 2024-01-19 | End: 2024-05-15

## 2024-01-23 RX ORDER — FUROSEMIDE 40 MG/1
40 TABLET ORAL DAILY
Qty: 30 TABLET | Refills: 6 | Status: SHIPPED | OUTPATIENT
Start: 2024-01-23 | End: 2024-05-15

## 2024-01-23 NOTE — PROGRESS NOTES
Subjective:   Patient ID:  Violet Swenson is a 83 y.o. female who presents for follow-up of HTN/CHF    HPI  Ms. Swenson is an 83 year old female patient whose current medical conditions include CAD s/p prior stents, CKD, combined CHF, persistent afib (on Eliquis), s/p PPM placement, CKD< depression, HTN, and hyperlipidemia who presents today for follow-up. Patient previously seen by Dr. Gil and had several med adjustments due to angina/CHF/elevated BP. She returns today and states she is doing ok. Still having issues with leg/feet swelling. Weight is also up 7 lbs. Does feel SOB with activity. Chronically sleeps on a wedge. No recent chest pain symptoms. Chronic LH/dizziness when changing positions too quickly. No near syncope, syncope, or falls. BP elevated today, but she has not been taking Lasix (prescription ). Unable to tolerate nitropatch due to severe headaches. No bleeding issues on Eliquis. No s/s of TIA/CVA.     Patient is dealing with a lot of familial stress. Labs reviewed. BNP elevated. Creatinine 1.3.     Review of Systems   Constitutional: Positive for malaise/fatigue. Negative for chills, decreased appetite and fever.   HENT:  Negative for congestion, hoarse voice and sore throat.    Eyes:  Negative for blurred vision and discharge.   Cardiovascular:  Positive for dyspnea on exertion and leg swelling. Negative for chest pain, claudication, cyanosis, irregular heartbeat, near-syncope, orthopnea, palpitations and paroxysmal nocturnal dyspnea.   Respiratory:  Negative for cough, hemoptysis, shortness of breath, snoring, sputum production and wheezing.    Endocrine: Negative for cold intolerance and heat intolerance.   Hematologic/Lymphatic: Negative for bleeding problem. Bruises/bleeds easily.   Skin:  Negative for rash.   Musculoskeletal:  Positive for arthritis and joint pain. Negative for back pain, joint swelling, muscle cramps, muscle weakness and myalgias.   Gastrointestinal:   "Negative for abdominal pain, constipation, diarrhea, heartburn, melena and nausea.   Genitourinary:  Negative for hematuria.   Neurological:  Positive for dizziness and light-headedness. Negative for focal weakness, headaches, loss of balance, numbness, paresthesias, seizures and weakness.   Psychiatric/Behavioral:  Negative for memory loss. The patient does not have insomnia.    Allergic/Immunologic: Negative for hives.     BP (!) 150/86 (BP Location: Left arm, Patient Position: Sitting, BP Method: Medium (Manual))   Pulse 75   Ht 5' 4" (1.626 m)   Wt 60.7 kg (133 lb 13.1 oz)   SpO2 95%   BMI 22.97 kg/m²     Objective:   Physical Exam  Vitals and nursing note reviewed.   Constitutional:       General: She is not in acute distress.     Appearance: Normal appearance. She is well-developed. She is not diaphoretic.   HENT:      Head: Normocephalic and atraumatic.   Eyes:      General:         Right eye: No discharge.         Left eye: No discharge.      Pupils: Pupils are equal, round, and reactive to light.   Cardiovascular:      Rate and Rhythm: Normal rate. Rhythm irregularly irregular.      Heart sounds: Normal heart sounds, S1 normal and S2 normal. No murmur heard.     Comments: PPM site well-healed  Pulmonary:      Effort: Pulmonary effort is normal. No respiratory distress.      Breath sounds: Normal breath sounds. No wheezing.      Comments: Mildly coarse BS  Abdominal:      General: There is no distension.   Musculoskeletal:      Right lower leg: Edema present.      Left lower leg: Edema present.   Skin:     General: Skin is warm and dry.      Findings: No erythema.   Neurological:      General: No focal deficit present.      Mental Status: She is alert and oriented to person, place, and time.   Psychiatric:         Mood and Affect: Mood normal.         Behavior: Behavior normal.         Thought Content: Thought content normal.       TTE Results 10/30/23  Summary         Left Ventricle: The left ventricle " is normal in size. Normal wall thickness. Normal wall motion. There is mildly reduced systolic function. Ejection fraction by visual approximation is 45%. Biplane (2D) method of discs ejection fraction is 48%. There is diastolic dysfunction but grade cannot be determined.    Right Ventricle: Normal right ventricular cavity size. Wall thickness is normal. Right ventricle wall motion  is normal. Systolic function is normal.    Left Atrium: Left atrium is moderately dilated.    Aortic Valve: There is mild aortic valve sclerosis. Mildly restricted motion. There is mild stenosis. Aortic valve area by VTI is 1.54 cm². Aortic valve peak velocity is 1.62 m/s. Mean gradient is 6 mmHg. The dimensionless index is 0.44.    Mitral Valve: There is mild bileaflet sclerosis. There is mild posterior mitral annular calcification present. There is moderate regurgitation.    Tricuspid Valve: There is mild to moderate regurgitation.    Pulmonary Artery: There is mild pulmonary hypertension. The estimated pulmonary artery systolic pressure is 45 mmHg.    IVC/SVC: Normal venous pressure at 3 mmHg.      Chemistry        Component Value Date/Time     01/22/2024 1254    K 4.7 01/22/2024 1254     (H) 01/22/2024 1254    CO2 18 (L) 01/22/2024 1254    BUN 25 (H) 01/22/2024 1254    CREATININE 1.3 01/22/2024 1254    GLU 78 01/22/2024 1254        Component Value Date/Time    CALCIUM 9.4 01/22/2024 1254    ALKPHOS 48 (L) 12/28/2023 0838    AST 39 12/28/2023 0838    ALT 39 12/28/2023 0838    BILITOT 0.4 12/28/2023 0838    ESTGFRAFRICA 35 (A) 05/31/2022 0909    EGFRNONAA 30 (A) 05/31/2022 0909          Assessment:      1. Ischemic cardiomyopathy    2. Atherosclerosis of native coronary artery of native heart with stable angina pectoris    3. Atherosclerosis of aorta    4. Cardiac pacemaker in situ    5. Coronary artery disease : multiple vessels, s/p PTCA    6. Essential hypertension    7. Mixed hyperlipidemia    8. Postural hypotension     9. Chronic systolic congestive heart failure    10. Stage 3b chronic kidney disease    11. Rheumatoid arthritis involving multiple sites with positive rheumatoid factor      Patient presents for f/u. Still with LE edema, BNP worse. She was out of Lasix, prescription sent. Advised to start today. Monitor salt intake. Continue other meds, she was unable to tolerate nitropatch due to headaches.   Plan:   -Start Lasix 40 mg daily  -Cardiac low salt diet  -Continue other CV meds/mgmt---patient on ASA, Plavix, and Eliquis will discuss with Dr. Gil  -RTC 1 week with Dr. Gil or Priscila Conte, CARLITO-C  -Will need repeat BMP +/- BNP

## 2024-01-23 NOTE — TELEPHONE ENCOUNTER
Please phone patient. Discussed with DR. Gil. Stop ASA. Continue Plavix and Eliquis. Med card updated.              The patient's daughter has been notified of the medication changes

## 2024-01-23 NOTE — TELEPHONE ENCOUNTER
Patient was notified of results. All questions were answered. Pt verbalized understanding. Pt will call back with any other questions or concerns.      Please phone patient. Discussed with DR. Gil. Stop ASA. Continue Plavix and Eliquis. Med card updated.       ----- Message from Nader Gil MD sent at 1/23/2024  8:06 AM CST -----  Thyroid is ok but seems to have fluid on board how is she feeling and how is the swelling

## 2024-01-23 NOTE — TELEPHONE ENCOUNTER
Please phone patient. Discussed with DR. Gil. Stop ASA. Continue Plavix and Eliquis. Med card updated.    Thanks

## 2024-01-23 NOTE — TELEPHONE ENCOUNTER
Attempted to contact pt to inform her Discussed with DR. Gil. Von CARY. Continue Plavix and Eliquis. No answer, LVM      ---- ELIZABETH Cedeno 01/23/24 02:47 PM ----  Please phone patient. Discussed with DR. Gil. Von CARY. Continue Plavix and Eliquis. Med card updated.    Thanks

## 2024-01-31 ENCOUNTER — OFFICE VISIT (OUTPATIENT)
Dept: CARDIOLOGY | Facility: CLINIC | Age: 84
End: 2024-01-31
Payer: MEDICARE

## 2024-01-31 ENCOUNTER — LAB VISIT (OUTPATIENT)
Dept: LAB | Facility: HOSPITAL | Age: 84
End: 2024-01-31
Payer: MEDICARE

## 2024-01-31 VITALS
OXYGEN SATURATION: 96 % | WEIGHT: 124.31 LBS | BODY MASS INDEX: 21.22 KG/M2 | SYSTOLIC BLOOD PRESSURE: 134 MMHG | HEIGHT: 64 IN | DIASTOLIC BLOOD PRESSURE: 86 MMHG | HEART RATE: 67 BPM

## 2024-01-31 DIAGNOSIS — I70.0 ATHEROSCLEROSIS OF AORTA: ICD-10-CM

## 2024-01-31 DIAGNOSIS — I95.1 POSTURAL HYPOTENSION: ICD-10-CM

## 2024-01-31 DIAGNOSIS — I50.22 CHRONIC SYSTOLIC CONGESTIVE HEART FAILURE: ICD-10-CM

## 2024-01-31 DIAGNOSIS — I48.0 PAROXYSMAL ATRIAL FIBRILLATION: ICD-10-CM

## 2024-01-31 DIAGNOSIS — I25.10 CORONARY ARTERY DISEASE INVOLVING NATIVE CORONARY ARTERY OF NATIVE HEART WITHOUT ANGINA PECTORIS: Chronic | ICD-10-CM

## 2024-01-31 DIAGNOSIS — N18.32 STAGE 3B CHRONIC KIDNEY DISEASE: ICD-10-CM

## 2024-01-31 DIAGNOSIS — I25.118 ATHEROSCLEROSIS OF NATIVE CORONARY ARTERY OF NATIVE HEART WITH STABLE ANGINA PECTORIS: ICD-10-CM

## 2024-01-31 DIAGNOSIS — E78.2 MIXED HYPERLIPIDEMIA: Chronic | ICD-10-CM

## 2024-01-31 DIAGNOSIS — I50.32 CHRONIC DIASTOLIC HEART FAILURE: ICD-10-CM

## 2024-01-31 DIAGNOSIS — I10 ESSENTIAL HYPERTENSION: Chronic | ICD-10-CM

## 2024-01-31 DIAGNOSIS — I25.5 ISCHEMIC CARDIOMYOPATHY: Chronic | ICD-10-CM

## 2024-01-31 DIAGNOSIS — I50.22 CHRONIC SYSTOLIC CONGESTIVE HEART FAILURE: Primary | ICD-10-CM

## 2024-01-31 LAB — BNP SERPL-MCNC: 1202 PG/ML (ref 0–99)

## 2024-01-31 PROCEDURE — 1159F MED LIST DOCD IN RCRD: CPT | Mod: HCNC,CPTII,S$GLB,

## 2024-01-31 PROCEDURE — 3079F DIAST BP 80-89 MM HG: CPT | Mod: HCNC,CPTII,S$GLB,

## 2024-01-31 PROCEDURE — 1160F RVW MEDS BY RX/DR IN RCRD: CPT | Mod: HCNC,CPTII,S$GLB,

## 2024-01-31 PROCEDURE — 1126F AMNT PAIN NOTED NONE PRSNT: CPT | Mod: HCNC,CPTII,S$GLB,

## 2024-01-31 PROCEDURE — 99999 PR PBB SHADOW E&M-EST. PATIENT-LVL V: CPT | Mod: PBBFAC,HCNC,,

## 2024-01-31 PROCEDURE — 3075F SYST BP GE 130 - 139MM HG: CPT | Mod: HCNC,CPTII,S$GLB,

## 2024-01-31 PROCEDURE — 36415 COLL VENOUS BLD VENIPUNCTURE: CPT | Mod: HCNC

## 2024-01-31 PROCEDURE — 83880 ASSAY OF NATRIURETIC PEPTIDE: CPT | Mod: HCNC

## 2024-01-31 PROCEDURE — 1101F PT FALLS ASSESS-DOCD LE1/YR: CPT | Mod: HCNC,CPTII,S$GLB,

## 2024-01-31 PROCEDURE — 3288F FALL RISK ASSESSMENT DOCD: CPT | Mod: HCNC,CPTII,S$GLB,

## 2024-01-31 PROCEDURE — 1157F ADVNC CARE PLAN IN RCRD: CPT | Mod: HCNC,CPTII,S$GLB,

## 2024-01-31 PROCEDURE — 99214 OFFICE O/P EST MOD 30 MIN: CPT | Mod: HCNC,S$GLB,,

## 2024-01-31 NOTE — PROGRESS NOTES
Subjective:   Patient ID:  Violet Swenson is a 83 y.o. female who presents for evaluation of No chief complaint on file.      HPI Ms. Swenson is an 83 year old female patient whose current medical conditions include CAD s/p prior stents, CKD, combined CHF, persistent afib (on Eliquis), s/p PPM placement, CKD< depression, HTN, and hyperlipidemia who presents today for follow-up. Seen last week by Hazel Jean PA-C for follow up on CHF sxs. Pt sees Dr. Gil regularly in clinic. Here today feels ok, denies any CP, angina or anginal equivalent at this time. Weight down 9lb since last visit, no edema on exam    Past Medical History:   Diagnosis Date    Age-related osteoporosis without current pathological fracture 8/20/2018    Anemia     Anxiety     Arthritis     Atrial flutter     Cancer     lymphoma Large cell B    CHF (congestive heart failure)     Chronic anemia 4/26/2017    Chronic midline low back pain with right-sided sciatica 8/20/2018    Coronary artery disease     01/2015 LHC patent LCX. 50% stenosis in LAD and RCA.      Depression     Disorder of kidney and ureter     Encounter for blood transfusion     GERD (gastroesophageal reflux disease)     Gout, arthritis     Heart failure     Hx of psychiatric care     Hyperlipidemia     Hypertension     Hypothyroidism     Immune deficiency disorder     Kidney disease     Lung nodule 2014    RML--stable    Obesity     Pacemaker     Metronic    Paroxysmal atrial fibrillation 3/15/2018    Pneumonia     Polyneuropathy     chemo induced    Psychiatric problem     Rheumatoid arthritis involving multiple sites with positive rheumatoid factor 4/19/2023    Stroke 11/16/2020    Tobacco dependence     quit 1976    Trouble in sleeping     Unstable angina 11/27/2020       Past Surgical History:   Procedure Laterality Date    APPENDECTOMY  1966 approx    CARDIAC PACEMAKER PLACEMENT  01/22/2015    CHOLECYSTECTOMY  1993    incidental at time of gastric bypass    COLON  SURGERY Right 2017    hemicolectomy    COLONOSCOPY N/A 4/6/2017    Procedure: COLONOSCOPY;  Surgeon: Tye Enamorado MD;  Location: [facility] ENDO;  Service: Endoscopy;  Laterality: N/A;    COLONOSCOPY N/A 11/28/2018    Procedure: COLONOSCOPY;  Surgeon: Saúl Arthur III, MD;  Location: [facility] ENDO;  Service: Endoscopy;  Laterality: N/A;    CORONARY ANGIOPLASTY  02/2014    CORONARY STENT PLACEMENT  02/05/2014    ESOPHAGOGASTRODUODENOSCOPY N/A 11/28/2018    Procedure: EGD (ESOPHAGOGASTRODUODENOSCOPY);  Surgeon: Saúl Arthur III, MD;  Location: [facility] ENDO;  Service: Endoscopy;  Laterality: N/A;    GASTRIC BYPASS  1993    with incidental choly    HERNIA REPAIR      IMPLANTATION OF BIVENTRICULAR PERMANENT PACEMAKER AS UPGRADE TO EXISTING PACEMAKER Left 7/13/2023    Procedure: Biventricular pacemaker upgrade/His lead vs CRT-P;  Surgeon: Velasquez Vizcaino MD;  Location: [facility] CATH LAB;  Service: Cardiology;  Laterality: Left;  Will need to upgrade her pacemaker.  Ideally his pacing lead would be the best approach. MDT/ Alternatively, an upgrade /His lead as Plan A  and  CRT if fails/notified MDT rep Mell and Arik  NOT MRI safe   Pacer and leads implanted    INJECTION OF ANESTHETIC AGENT INTO SACROILIAC JOINT Right 10/8/2020    Procedure: Right BLOCK, SACROILIAC JOINT with RN IV sedation;  Surgeon: Madi Anton MD;  Location: [facility];  Service: Pain Management;  Laterality: Right;    JOINT REPLACEMENT Bilateral 2009    3 months apart    TONSILLECTOMY  1959    VENOGRAM, CATH LAB Bilateral 6/29/2023    Procedure: Venogram, Cath Lab;  Surgeon: Velasquez Vizcaino MD;  Location: [facility] CATH LAB;  Service: Cardiology;  Laterality: Bilateral;  1:00PM arrival time  NOT MRI safe   Pacer and leads implanted by [facility]ian 1/22/15   MDTR SEDR01 [place], [id number]   A lead: SAMEER 5076 CapSure Fix [employer], [id number]   RV lead: SAMEER 5076 CapSure Fix Novus, [id number]       Social History     Tobacco Use    Smoking status: Former     " Current packs/day: 0.00     Average packs/day: 2.0 packs/day for 6.0 years (12.0 ttl pk-yrs)     Types: Cigarettes     Start date: 3/15/1970     Quit date: 3/15/1976     Years since quittin.9    Smokeless tobacco: Never   Substance Use Topics    Alcohol use: No     Alcohol/week: 0.0 standard drinks of alcohol    Drug use: No       Family History   Problem Relation Age of Onset    Heart disease Mother     Hypertension Mother     Cataracts Mother     Stomach cancer Father         "ulcers that turned to cancer"    Cancer Father         stomach    Pancreatic cancer Sister     Cancer Sister         pancreatic    Leukemia Brother         "leukemia which led to intestinal cancer"    Cataracts Brother     Cancer Brother         leukemia then later stomach cancer    Drug abuse Son     Cancer Son         prostate cancer    Prostate cancer Son     COPD Daughter     Asthma Daughter     Hypertension Maternal Grandfather     Stroke Maternal Grandfather     Alcohol abuse Neg Hx     Diabetes Neg Hx     Intellectual disability Neg Hx     Mental illness Neg Hx        Current Outpatient Medications on File Prior to Visit   Medication Sig Dispense Refill    acetaminophen (TYLENOL ARTHRITIS PAIN) 650 MG TbSR Take 650 mg by mouth every 8 (eight) hours.      allopurinoL (ZYLOPRIM) 300 MG tablet Take 1 tablet (300 mg total) by mouth once daily. 90 tablet 11    ascorbic acid, vitamin C, (VITAMIN C) 100 MG tablet Take by mouth once daily.      azelastine (ASTELIN) 137 mcg (0.1 %) nasal spray 1 spray (137 mcg total) by Nasal route 2 (two) times daily. 30 mL 0    busPIRone (BUSPAR) 7.5 MG tablet Take 1 tablet (7.5 mg total) by mouth 2 (two) times daily. 60 tablet 5    calcitRIOL (ROCALTROL) 0.25 MCG Cap TAKE ONE CAPSULE BY MOUTH EVERY MONDAY, WEDNESDAY AND FRIDAY 12 capsule 3    celecoxib (CELEBREX) 100 MG capsule Take 1 capsule (100 mg total) by mouth 2 (two) times daily. 42 capsule 0    clopidogreL (PLAVIX) 75 mg tablet Take 75 mg by " mouth once daily.      diclofenac sodium 1 % Gel Apply 2 g topically once daily. Apply 2 g over painful joints once or twice a day. 100 g 6    DIPRIVAN 10 mg/mL infusion       DULoxetine (CYMBALTA) 30 MG capsule TAKE ONE CAPSULE BY MOUTH EVERY DAY 30 capsule 11    ELIQUIS 2.5 mg Tab TAKE ONE TABLET BY MOUTH TWICE DAILY 180 tablet 3    fluticasone propionate (FLONASE) 50 mcg/actuation nasal spray 2 sprays (100 mcg total) by Each Nostril route once daily. 16 g 11    furosemide (LASIX) 40 MG tablet Take 1 tablet (40 mg total) by mouth once daily. 1 every day, then 2 tablets every other day 30 tablet 6    glucosamine-D3-Boswellia serr (OSTEO BI-FLEX, 5-LOXIN,) 1,500-400-100 mg-unit-mg Tab Take by mouth.      hydroxychloroquine (PLAQUENIL) 200 mg tablet Take 1 tablet (200 mg total) by mouth every other day. 45 tablet 1    iron-vitamin C 100-250 mg, ICAR-C, 100-250 mg Tab TAKE ONE TABLET BY MOUTH EVERY DAY 30 tablet 4    levocetirizine (XYZAL) 5 MG tablet TAKE ONE TABLET BY MOUTH EVERY EVENING 90 tablet 3    levothyroxine (SYNTHROID) 100 MCG tablet Take 1 tablet (100 mcg total) by mouth before breakfast. 90 tablet 3    metoprolol tartrate (LOPRESSOR) 25 MG tablet TAKE ONE TABLET BY MOUTH TWICE DAILY 60 tablet 6    multivitamin (THERAGRAN) per tablet Take 1 tablet by mouth once daily.      olmesartan (BENICAR) 20 MG tablet Take 1 tablet (20 mg total) by mouth once daily. 90 tablet 3    pravastatin (PRAVACHOL) 40 MG tablet Take 1 tablet (40 mg total) by mouth once daily. 90 tablet 3    pregabalin (LYRICA) 50 MG capsule Take 50 mg by mouth 3 (three) times daily.      sertraline (ZOLOFT) 100 MG tablet Take 1.5 tablets (150 mg total) by mouth once daily. 135 tablet 3    sodium bicarbonate 650 MG tablet TAKE ONE TABLET BY MOUTH TWICE DAILY 60 tablet 11    tumeric-ging-olive-oreg-capryl 100 mg-150 mg- 50 mg-150 mg Cap Take by mouth.      VITAMIN D2 1,250 mcg (50,000 unit) capsule TAKE ONE CAPSULE BY MOUTH EVERY 7 DAYS 12 capsule  3    ZINC ACETATE ORAL Take 250 mg by mouth once daily.        Current Facility-Administered Medications on File Prior to Visit   Medication Dose Route Frequency Provider Last Rate Last Admin    denosumab (PROLIA) injection 60 mg  60 mg Subcutaneous Q6 Months Dilshad Rogers MD   60 mg at 08/29/18 1032      Wt Readings from Last 3 Encounters:   01/31/24 56.4 kg (124 lb 5.4 oz)   01/23/24 60.7 kg (133 lb 13.1 oz)   01/04/24 57.2 kg (126 lb 1.7 oz)     Temp Readings from Last 3 Encounters:   12/19/23 96 °F (35.6 °C) (Tympanic)   07/13/23 97.9 °F (36.6 °C) (Temporal)   06/29/23 97.5 °F (36.4 °C) (Temporal)     BP Readings from Last 3 Encounters:   01/31/24 134/86   01/23/24 (!) 150/86   01/04/24 128/77     Pulse Readings from Last 3 Encounters:   01/31/24 67   01/23/24 75   01/04/24 69        Review of Systems   Constitutional: Negative.   HENT: Negative.     Eyes: Negative.    Cardiovascular: Negative.    Respiratory: Negative.     Skin: Negative.    Musculoskeletal: Negative.    Gastrointestinal: Negative.    Genitourinary: Negative.    Neurological: Negative.    Psychiatric/Behavioral: Negative.         Objective:   Physical Exam  Vitals and nursing note reviewed.   Constitutional:       Appearance: Normal appearance.   HENT:      Head: Normocephalic.   Eyes:      Pupils: Pupils are equal, round, and reactive to light.   Cardiovascular:      Rate and Rhythm: Normal rate and regular rhythm.      Heart sounds: Normal heart sounds, S1 normal and S2 normal. No murmur heard.     No S3 or S4 sounds.   Pulmonary:      Effort: Pulmonary effort is normal.      Breath sounds: Normal breath sounds.   Abdominal:      General: Bowel sounds are normal.      Palpations: Abdomen is soft.   Musculoskeletal:         General: Normal range of motion.      Cervical back: Normal range of motion.   Skin:     Capillary Refill: Capillary refill takes less than 2 seconds.   Neurological:      General: No focal deficit present.       Mental Status: She is alert and oriented to person, place, and time.   Psychiatric:         Mood and Affect: Mood normal.         Behavior: Behavior normal.         Thought Content: Thought content normal.         Lab Results   Component Value Date    CHOL 164 12/28/2023    CHOL 139 06/16/2023    CHOL 153 03/30/2022     Lab Results   Component Value Date    HDL 62 12/28/2023    HDL 63 06/16/2023    HDL 67 03/30/2022     Lab Results   Component Value Date    LDLCALC 74.2 12/28/2023    LDLCALC 56.6 (L) 06/16/2023    LDLCALC 69.8 03/30/2022     Lab Results   Component Value Date    TRIG 139 12/28/2023    TRIG 97 06/16/2023    TRIG 81 03/30/2022     Lab Results   Component Value Date    CHOLHDL 37.8 12/28/2023    CHOLHDL 45.3 06/16/2023    CHOLHDL 43.8 03/30/2022       Chemistry        Component Value Date/Time     01/22/2024 1254    K 4.7 01/22/2024 1254     (H) 01/22/2024 1254    CO2 18 (L) 01/22/2024 1254    BUN 25 (H) 01/22/2024 1254    CREATININE 1.3 01/22/2024 1254    GLU 78 01/22/2024 1254        Component Value Date/Time    CALCIUM 9.4 01/22/2024 1254    ALKPHOS 48 (L) 12/28/2023 0838    AST 39 12/28/2023 0838    ALT 39 12/28/2023 0838    BILITOT 0.4 12/28/2023 0838    ESTGFRAFRICA 35 (A) 05/31/2022 0909    EGFRNONAA 30 (A) 05/31/2022 0909          Lab Results   Component Value Date    TSH 5.248 (H) 01/22/2024     Lab Results   Component Value Date    INR 1.0 06/23/2023    INR 1.0 04/03/2023    INR 0.9 11/07/2021     @RESUFAST(WBC,HGB,HCT,MCV,PLT)  @LABRCNTIP(BNP,BNPTRIAGEBLO)@  CrCl cannot be calculated (Patient's most recent lab result is older than the maximum 7 days allowed.).     Results for orders placed during the hospital encounter of 10/30/23    Echo    Interpretation Summary    Left Ventricle: The left ventricle is normal in size. Normal wall thickness. Normal wall motion. There is mildly reduced systolic function. Ejection fraction by visual approximation is 45%. Biplane (2D) method of  discs ejection fraction is 48%. There is diastolic dysfunction but grade cannot be determined.    Right Ventricle: Normal right ventricular cavity size. Wall thickness is normal. Right ventricle wall motion  is normal. Systolic function is normal.    Left Atrium: Left atrium is moderately dilated.    Aortic Valve: There is mild aortic valve sclerosis. Mildly restricted motion. There is mild stenosis. Aortic valve area by VTI is 1.54 cm². Aortic valve peak velocity is 1.62 m/s. Mean gradient is 6 mmHg. The dimensionless index is 0.44.    Mitral Valve: There is mild bileaflet sclerosis. There is mild posterior mitral annular calcification present. There is moderate regurgitation.    Tricuspid Valve: There is mild to moderate regurgitation.    Pulmonary Artery: There is mild pulmonary hypertension. The estimated pulmonary artery systolic pressure is 45 mmHg.    IVC/SVC: Normal venous pressure at 3 mmHg.     Results for orders placed during the hospital encounter of 05/19/23    Nuclear Stress - Cardiology Interpreted    Interpretation Summary    Normal myocardial perfusion scan. There is no evidence of myocardial ischemia or infarction.    There is a trivial fixed anteroseptal perfusion abnormality consistent with the patient's underlying A sensing and V pacing    The gated perfusion images showed an ejection fraction of 62% at rest. The gated perfusion images showed an ejection fraction of 49% post stress.    Pt experienced chest heaviness post infusion- resolved by the end of recovery.     Assessment:      1. Essential hypertension    2. Mixed hyperlipidemia    3. Coronary artery disease : multiple vessels, s/p PTCA    4. Ischemic cardiomyopathy    5. Atherosclerosis of aorta    6. Paroxysmal atrial fibrillation    7. Postural hypotension    8. Chronic diastolic heart failure    9. Chronic systolic congestive heart failure    10. Atherosclerosis of native coronary artery of native heart with stable angina pectoris     11. Stage 3b chronic kidney disease        Plan:   Essential hypertension    Mixed hyperlipidemia    Coronary artery disease : multiple vessels, s/p PTCA    Ischemic cardiomyopathy    Atherosclerosis of aorta    Paroxysmal atrial fibrillation    Postural hypotension    Chronic diastolic heart failure    Chronic systolic congestive heart failure    Atherosclerosis of native coronary artery of native heart with stable angina pectoris    Stage 3b chronic kidney disease      Cont current CV medications  RF modifications  Daily exercise as tolerated  Low Na low fat diet    Labs today, consider returning to daily lasix will review labs    RTC 3 mos or sooner if needed    Courtney Guillot, FNP-C [facility], Cardiology

## 2024-02-01 ENCOUNTER — TELEPHONE (OUTPATIENT)
Dept: CARDIOLOGY | Facility: CLINIC | Age: 84
End: 2024-02-01
Payer: MEDICARE

## 2024-02-01 NOTE — TELEPHONE ENCOUNTER
Contacted pt and informed her Fluid marker is down from previous, ok to resume lasix 20mg daily, monitor weight daily ok to return to increased dose as you were doing previously if swelling returns. Pt yanni w/o q/c      ----- Message from Priscila Conte NP sent at 2/1/2024  1:57 PM CST -----  Fluid marker is down from previous, ok to resume lasix 20mg daily, monitor weight daily ok to return to increased dose as you were doing previously if swelling returns

## 2024-02-23 ENCOUNTER — TELEPHONE (OUTPATIENT)
Dept: CARDIOLOGY | Facility: CLINIC | Age: 84
End: 2024-02-23
Payer: MEDICARE

## 2024-02-23 NOTE — TELEPHONE ENCOUNTER
Contacted pt and got her BMP scheduled for 03/01/24. Pt vu w/o q/c    ----- Message from Hazel Jean PA-C sent at 2/23/2024  2:16 PM CST -----  Can do a BMP at her convenience  ----- Message -----  From: Danielle Moreno MA  Sent: 2/23/2024   9:12 AM CST  To: Hazel Jean PA-C    Does she need labs still?  ----- Message -----  From: SYSTEM  Sent: 2/22/2024   1:22 AM CST  To: [facility] Cardiology Clinical Support

## 2024-03-01 ENCOUNTER — LAB VISIT (OUTPATIENT)
Dept: LAB | Facility: HOSPITAL | Age: 84
End: 2024-03-01
Attending: PHYSICIAN ASSISTANT
Payer: MEDICARE

## 2024-03-01 DIAGNOSIS — I25.5 ISCHEMIC CARDIOMYOPATHY: Chronic | ICD-10-CM

## 2024-03-01 LAB
ANION GAP SERPL CALC-SCNC: 7 MMOL/L (ref 8–16)
BUN SERPL-MCNC: 40 MG/DL (ref 8–23)
CALCIUM SERPL-MCNC: 9 MG/DL (ref 8.7–10.5)
CHLORIDE SERPL-SCNC: 116 MMOL/L (ref 95–110)
CO2 SERPL-SCNC: 16 MMOL/L (ref 23–29)
CREAT SERPL-MCNC: 2.1 MG/DL (ref 0.5–1.4)
EST. GFR  (NO RACE VARIABLE): 22.9 ML/MIN/1.73 M^2
GLUCOSE SERPL-MCNC: 79 MG/DL (ref 70–110)
POTASSIUM SERPL-SCNC: 4.5 MMOL/L (ref 3.5–5.1)
SODIUM SERPL-SCNC: 139 MMOL/L (ref 136–145)

## 2024-03-01 PROCEDURE — 80048 BASIC METABOLIC PNL TOTAL CA: CPT | Mod: HCNC | Performed by: PHYSICIAN ASSISTANT

## 2024-03-01 PROCEDURE — 36415 COLL VENOUS BLD VENIPUNCTURE: CPT | Mod: HCNC | Performed by: PHYSICIAN ASSISTANT

## 2024-03-04 ENCOUNTER — TELEPHONE (OUTPATIENT)
Dept: CARDIOLOGY | Facility: HOSPITAL | Age: 84
End: 2024-03-04
Payer: MEDICARE

## 2024-03-04 DIAGNOSIS — R79.89 ELEVATED SERUM CREATININE: ICD-10-CM

## 2024-03-04 DIAGNOSIS — I50.32 CHRONIC DIASTOLIC HEART FAILURE: Primary | ICD-10-CM

## 2024-03-04 NOTE — TELEPHONE ENCOUNTER
Please phone patient. BMP reviewed, creatinine bumped to 2.1, she may be on dry side.    Hold Lasix for now, use on prn

## 2024-03-04 NOTE — TELEPHONE ENCOUNTER
Spoke with the patient and she will get repeat labs this friday          Referral placed, see if she is willing to check a repeat BMP on Friday.

## 2024-03-04 NOTE — TELEPHONE ENCOUNTER
Patient has been notified of the results and medication instructions were given. The patient refused to see any other dr besides Dr. Gil so she wont be in till April for her f/u appointment. The patient would like a referral to be placed to nephrology.              Please phone patient. Her creatinine bumped to 2.1, she is likely on dry side.     Have her hold Lasix and use only on prn basis. Please get her seen in clinic this week. She also needs nephrology appt.

## 2024-03-05 DIAGNOSIS — T45.1X5A CHEMOTHERAPY-INDUCED NEUROPATHY: Chronic | ICD-10-CM

## 2024-03-05 DIAGNOSIS — I70.0 ATHEROSCLEROSIS OF AORTA: ICD-10-CM

## 2024-03-05 DIAGNOSIS — I50.22 CHRONIC SYSTOLIC CONGESTIVE HEART FAILURE: ICD-10-CM

## 2024-03-05 DIAGNOSIS — I25.10 CORONARY ARTERY DISEASE INVOLVING NATIVE CORONARY ARTERY OF NATIVE HEART WITHOUT ANGINA PECTORIS: Chronic | ICD-10-CM

## 2024-03-05 DIAGNOSIS — Z95.0 CARDIAC PACEMAKER IN SITU: ICD-10-CM

## 2024-03-05 DIAGNOSIS — I10 ESSENTIAL HYPERTENSION: Primary | Chronic | ICD-10-CM

## 2024-03-05 DIAGNOSIS — I25.5 ISCHEMIC CARDIOMYOPATHY: Chronic | ICD-10-CM

## 2024-03-05 DIAGNOSIS — E78.2 MIXED HYPERLIPIDEMIA: Chronic | ICD-10-CM

## 2024-03-05 DIAGNOSIS — I25.118 ATHEROSCLEROSIS OF NATIVE CORONARY ARTERY OF NATIVE HEART WITH STABLE ANGINA PECTORIS: ICD-10-CM

## 2024-03-05 DIAGNOSIS — G62.0 CHEMOTHERAPY-INDUCED NEUROPATHY: Chronic | ICD-10-CM

## 2024-03-07 NOTE — TELEPHONE ENCOUNTER
----- Message from Zhane Hendrix sent at 7/23/2018  4:18 PM CDT -----  Contact: Pt   Pt called to get biopsy results..[phone] (home)      UROLOGY TRANSFER OF CARE    INDICATION:    Lower urinary tract symptoms  REQUESTING PRACTITIONER: Diya Meadows MD   PRACTITIONER:   Mayela Morgan PA-C/Wiliam Clancy MD  DATE:     3/7/2024    HISTORY:  The patient is a 54 year old male with PMHx of BPH with LUTS, DMII, tobacco use, DVT on Pradaxa who was admitted with BRBPR (bright red blood per rectum) [K62.5].    Patient presented to the ED yesterday with complaints of blood in the stool. He also endorsed nausea, diaphoresis, dizziness and generalized arthralgias for over a week. In the ED, VSS, afebrile. Labs include: CRT 1.15, WBC 7.2, HGB 14.2. UA was negative for blood, leukocytes. There was no mention of bacteria. PVR was 372mL, no frausto placed. GI was consulted and recommended admission and bowel prep. Patient requested Urology see him regarding his prostate. Consult placed for this reason.     Patient was seen while in the ED. He was resting comfortably in bed. States that he is still having urinary frequency and dysuria. He denies hematuria. He does not feel that he is retaining urine. Denies abdominal discomfort, flank pain, F/C. Overall, he is most frustrated by his urinary frequency, which is about once every 2 hours.    Patient was seen by Dr. Clancy 3/4/24 to establish care for BPH with LUTS. At this visit, patient complained of \"18 month history of pain when he voids, urinary frequency, nocturia x5, and has microscopic hematuria.\" Patient had a tender prostate on exam. He was started on bactrim for prostatitis. He was started on Flomax as well. CTU and PSA were ordered. Plan was to have him return in 1 month for cystoscopy.       Patient Active Problem List    Diagnosis Date Noted    BRBPR (bright red blood per rectum) 03/07/2024     Priority: Low    BPH (benign prostatic hyperplasia) 02/26/2024     Priority: Low    Type 2 diabetes mellitus, without long-term current use of insulin (CMD) 02/26/2024     Priority: Low    Tobacco use  02/26/2024     Priority: Low    H/O: depression 02/26/2024     Priority: Low    Anxiety 02/26/2024     Priority: Low    Blood in stool 02/25/2024     Priority: Low    Acute deep vein thrombosis (DVT) of peroneal vein, unspecified laterality (CMD) 02/22/2024     Priority: Low    Lumbar radiculopathy 11/14/2022     Priority: Low    Congenital spondylolisthesis of lumbosacral region 11/14/2022     Priority: Low    Follow-up examination, following other surgery 10/08/2020     Priority: Low    Ventral hernia without obstruction or gangrene 08/04/2020     Priority: Low     Past Medical History:   Diagnosis Date    Chronic low back pain     Ventral hernia      History reviewed. No pertinent surgical history.    Medications/Infusions:  Scheduled:    Current Facility-Administered Medications   Medication Dose Route Frequency Provider Last Rate Last Admin    PARoxetine (PAXIL) tablet 10 mg  10 mg Oral QAM Diya Meadows MD        PARoxetine (PAXIL) tablet 40 mg  40 mg Oral Daily Diya Meadows MD        tamsulosin (FLOMAX) capsule 0.8 mg  0.8 mg Oral Daily PC Diya Meadows MD        sodium chloride 0.9 % injection 2 mL  2 mL Intracatheter 2 times per day Diya Meadows MD        Potassium Standard Replacement Protocol (Levels 3.5 and lower)   Does not apply See Admin Instructions Diya Meadows MD        Potassium Replacement (Levels 3.6 - 4)   Does not apply See Admin Instructions Diya Meadows MD        Magnesium Standard Replacement Protocol   Does not apply See Admin Instructions Diya Meadows MD           Continuous Infusions:    Current Facility-Administered Medications   Medication Dose Route Frequency Provider Last Rate Last Admin       ALLERGIES:  No Known Allergies  Social History     Tobacco Use    Smoking status: Former     Current packs/day: 0.25     Types: Cigarettes    Smokeless tobacco: Never   Substance Use Topics    Alcohol use: Yes     Alcohol/week: 12.0 standard drinks of alcohol      Types: 12 Cans of beer per week     Family History   Problem Relation Age of Onset    Hypertension Mother     Hypertension Father        Review of Systems  A complete review of systems was performed with pertinent positives per HPI (History of Present Illness).    Objective:    Vitals:    03/07/24 0500   BP:    Pulse: 69   Resp: (!) 40   Temp:        Intake/Output Summary (Last 24 hours) at 3/7/2024 0826  Last data filed at 3/6/2024 2245  Gross per 24 hour   Intake --   Output 150 ml   Net -150 ml     No intake/output data recorded.    I/O last 3 completed shifts:  In: -   Out: 150 [Urine:150]      PHYSICAL EXAMINATION  General:  The patient is well developed, well nourished, in no acute distress, appears stated age.   Neurologic:  Alert.  Normal mood and affect.   Skin:  Warm and dry.   Respiratory:  Respiratory effort normal.   Cardiovascular:  Regular rate .   Back:  No costovertebral angle tenderness b/l.   Abdomen:  non-distended, non-tender  Extremities:  No swelling or tenderness  There was no urine to evaluate    Data Review:  Laboratory Results:  Recent Labs     03/04/24  1941 03/06/24 2147 03/06/24 2148 03/07/24  0610   SODIUM 142  --  140 140   POTASSIUM 4.1  --  4.3 4.1   CHLORIDE 108  --  109 109   CO2 29  --  27 28   GLUCOSE 137*  --  161* 148*   BUN 22*  --  18 16   CREATININE 0.97 1.20* 1.15 1.08   ALBUMIN 3.4*  --  3.2*  --    BILIRUBIN 0.2  --  0.3  --    AST 20  --  26  --      Recent Labs     03/04/24  1703 03/06/24 2148 03/07/24  0610   WBC 8.0 7.2 6.8   RBC 5.01 4.80 4.67   HCT 44.9 42.6 41.6   HGB 15.4 14.2 13.9   * 164 188     Lab Results   Component Value Date    PTT 67 (H) 02/28/2024     02/22/2024    CRP <0.3 02/23/2024    TSH 1.433 11/29/2023       Urinalysis  Lab Results   Component Value Date    WBC 6.8 03/07/2024    RBC 4.67 03/07/2024    URBC Negative 03/06/2024    UBILI Negative 03/06/2024    UPH 5.5 03/06/2024       No results found for: \"PSA\"    Imaging and other  studies:  NA    ASSESSMENT:  BPH with LUTS  Prostatitis  GIB  DVT on OAC  DMII    PLAN:  Obtain urine culture  Continue flomax 0.8mg qd  Bladder scan for PVR qshift  Continue Bactrim for prostatitis  Trial pyridium  Obtain CTU while here    Discussed with: patient, MD Mayela Valadez PA-C [facility]  Office Phone: [phone]  Pager: [phone]    Please page urology PA/NP with questions on weekdays. **If you have paged me and have not had page returned in 15 minutes - Please page [phone]**    Please page the answering service with questions between [phone] and on weekends.    Consulting Urologist:  Wiliam Clancy MD   [phone]

## 2024-03-08 ENCOUNTER — LAB VISIT (OUTPATIENT)
Dept: LAB | Facility: HOSPITAL | Age: 84
End: 2024-03-08
Attending: PHYSICIAN ASSISTANT
Payer: MEDICARE

## 2024-03-08 DIAGNOSIS — I50.32 CHRONIC DIASTOLIC HEART FAILURE: ICD-10-CM

## 2024-03-08 LAB
ANION GAP SERPL CALC-SCNC: 8 MMOL/L (ref 8–16)
BUN SERPL-MCNC: 35 MG/DL (ref 8–23)
CALCIUM SERPL-MCNC: 9.4 MG/DL (ref 8.7–10.5)
CHLORIDE SERPL-SCNC: 114 MMOL/L (ref 95–110)
CO2 SERPL-SCNC: 17 MMOL/L (ref 23–29)
CREAT SERPL-MCNC: 1.4 MG/DL (ref 0.5–1.4)
EST. GFR  (NO RACE VARIABLE): 37.3 ML/MIN/1.73 M^2
GLUCOSE SERPL-MCNC: 75 MG/DL (ref 70–110)
POTASSIUM SERPL-SCNC: 5.1 MMOL/L (ref 3.5–5.1)
SODIUM SERPL-SCNC: 139 MMOL/L (ref 136–145)

## 2024-03-08 PROCEDURE — 36415 COLL VENOUS BLD VENIPUNCTURE: CPT | Mod: HCNC | Performed by: PHYSICIAN ASSISTANT

## 2024-03-08 PROCEDURE — 80048 BASIC METABOLIC PNL TOTAL CA: CPT | Mod: HCNC | Performed by: PHYSICIAN ASSISTANT

## 2024-03-11 ENCOUNTER — TELEPHONE (OUTPATIENT)
Dept: CARDIOLOGY | Facility: CLINIC | Age: 84
End: 2024-03-11
Payer: MEDICARE

## 2024-03-11 NOTE — PROGRESS NOTES
BMP reviewed---creatinine normalized. Can go back to 40 mg of Lasix daily, may take extra prn for swelling, SOB etc.    Thanks

## 2024-03-11 NOTE — TELEPHONE ENCOUNTER
Contacted pt and informed her BMP reviewed---creatinine normalized. Can go back to 40 mg of Lasix daily, may take extra prn for swelling, SOB etc. Pt vu w/o q/c    ----- Message from Hazel Jean PA-C sent at 3/11/2024 12:37 PM CDT -----  BMP reviewed---creatinine normalized. Can go back to 40 mg of Lasix daily, may take extra prn for swelling, SOB etc.    Thanks

## 2024-04-04 ENCOUNTER — HOSPITAL ENCOUNTER (OUTPATIENT)
Dept: CARDIOLOGY | Facility: HOSPITAL | Age: 84
Discharge: HOME | End: 2024-04-04
Payer: MEDICARE

## 2024-04-04 ENCOUNTER — OFFICE VISIT (OUTPATIENT)
Dept: CARDIOLOGY | Facility: CLINIC | Age: 84
End: 2024-04-04
Payer: MEDICARE

## 2024-04-04 VITALS
HEART RATE: 48 BPM | SYSTOLIC BLOOD PRESSURE: 162 MMHG | DIASTOLIC BLOOD PRESSURE: 100 MMHG | HEIGHT: 64 IN | WEIGHT: 128.06 LBS | BODY MASS INDEX: 21.86 KG/M2

## 2024-04-04 DIAGNOSIS — T45.1X5A CHEMOTHERAPY-INDUCED NEUROPATHY: Chronic | ICD-10-CM

## 2024-04-04 DIAGNOSIS — I25.5 ISCHEMIC CARDIOMYOPATHY: Chronic | ICD-10-CM

## 2024-04-04 DIAGNOSIS — M05.79 RHEUMATOID ARTHRITIS INVOLVING MULTIPLE SITES WITH POSITIVE RHEUMATOID FACTOR: ICD-10-CM

## 2024-04-04 DIAGNOSIS — Z95.0 CARDIAC PACEMAKER IN SITU: ICD-10-CM

## 2024-04-04 DIAGNOSIS — I25.118 ATHEROSCLEROSIS OF NATIVE CORONARY ARTERY OF NATIVE HEART WITH STABLE ANGINA PECTORIS: ICD-10-CM

## 2024-04-04 DIAGNOSIS — I10 ESSENTIAL HYPERTENSION: Chronic | ICD-10-CM

## 2024-04-04 DIAGNOSIS — E78.2 MIXED HYPERLIPIDEMIA: Chronic | ICD-10-CM

## 2024-04-04 DIAGNOSIS — N18.32 STAGE 3B CHRONIC KIDNEY DISEASE: ICD-10-CM

## 2024-04-04 DIAGNOSIS — I48.0 PAROXYSMAL ATRIAL FIBRILLATION: ICD-10-CM

## 2024-04-04 DIAGNOSIS — Z98.84 S/P GASTRIC BYPASS: Chronic | ICD-10-CM

## 2024-04-04 DIAGNOSIS — I25.10 CORONARY ARTERY DISEASE INVOLVING NATIVE CORONARY ARTERY OF NATIVE HEART WITHOUT ANGINA PECTORIS: Chronic | ICD-10-CM

## 2024-04-04 DIAGNOSIS — G62.0 CHEMOTHERAPY-INDUCED NEUROPATHY: Chronic | ICD-10-CM

## 2024-04-04 DIAGNOSIS — I25.10 CORONARY ARTERY DISEASE INVOLVING NATIVE CORONARY ARTERY OF NATIVE HEART WITHOUT ANGINA PECTORIS: Primary | Chronic | ICD-10-CM

## 2024-04-04 DIAGNOSIS — I70.0 ATHEROSCLEROSIS OF AORTA: ICD-10-CM

## 2024-04-04 DIAGNOSIS — R76.8 CENTROMERE ANTIBODY POSITIVE: ICD-10-CM

## 2024-04-04 DIAGNOSIS — I50.22 CHRONIC SYSTOLIC CONGESTIVE HEART FAILURE: ICD-10-CM

## 2024-04-04 DIAGNOSIS — D64.9 CHRONIC ANEMIA: Chronic | ICD-10-CM

## 2024-04-04 DIAGNOSIS — Z96.653 S/P TKR (TOTAL KNEE REPLACEMENT), BILATERAL: Chronic | ICD-10-CM

## 2024-04-04 DIAGNOSIS — C83.30 DIFFUSE LARGE B-CELL LYMPHOMA, UNSPECIFIED BODY REGION: ICD-10-CM

## 2024-04-04 DIAGNOSIS — E03.9 HYPOTHYROIDISM (ACQUIRED): Chronic | ICD-10-CM

## 2024-04-04 LAB
OHS QRS DURATION: 102 MS
OHS QTC CALCULATION: 431 MS

## 2024-04-04 PROCEDURE — 93005 ELECTROCARDIOGRAM TRACING: CPT | Mod: HCNC

## 2024-04-04 PROCEDURE — 3080F DIAST BP >= 90 MM HG: CPT | Mod: HCNC,CPTII,S$GLB, | Performed by: INTERNAL MEDICINE

## 2024-04-04 PROCEDURE — 1159F MED LIST DOCD IN RCRD: CPT | Mod: HCNC,CPTII,S$GLB, | Performed by: INTERNAL MEDICINE

## 2024-04-04 PROCEDURE — 3077F SYST BP >= 140 MM HG: CPT | Mod: HCNC,CPTII,S$GLB, | Performed by: INTERNAL MEDICINE

## 2024-04-04 PROCEDURE — 93010 ELECTROCARDIOGRAM REPORT: CPT | Mod: HCNC,,, | Performed by: INTERNAL MEDICINE

## 2024-04-04 PROCEDURE — 1157F ADVNC CARE PLAN IN RCRD: CPT | Mod: HCNC,CPTII,S$GLB, | Performed by: INTERNAL MEDICINE

## 2024-04-04 PROCEDURE — 1101F PT FALLS ASSESS-DOCD LE1/YR: CPT | Mod: HCNC,CPTII,S$GLB, | Performed by: INTERNAL MEDICINE

## 2024-04-04 PROCEDURE — 1160F RVW MEDS BY RX/DR IN RCRD: CPT | Mod: HCNC,CPTII,S$GLB, | Performed by: INTERNAL MEDICINE

## 2024-04-04 PROCEDURE — 99214 OFFICE O/P EST MOD 30 MIN: CPT | Mod: HCNC,S$GLB,, | Performed by: INTERNAL MEDICINE

## 2024-04-04 PROCEDURE — 3288F FALL RISK ASSESSMENT DOCD: CPT | Mod: HCNC,CPTII,S$GLB, | Performed by: INTERNAL MEDICINE

## 2024-04-04 PROCEDURE — 1125F AMNT PAIN NOTED PAIN PRSNT: CPT | Mod: HCNC,CPTII,S$GLB, | Performed by: INTERNAL MEDICINE

## 2024-04-04 PROCEDURE — 99999 PR PBB SHADOW E&M-EST. PATIENT-LVL III: CPT | Mod: PBBFAC,HCNC,, | Performed by: INTERNAL MEDICINE

## 2024-04-04 RX ORDER — OLMESARTAN MEDOXOMIL 20 MG/1
20 TABLET ORAL DAILY
Qty: 90 TABLET | Refills: 3 | Status: SHIPPED | OUTPATIENT
Start: 2024-04-04 | End: 2025-04-04

## 2024-04-04 NOTE — PROGRESS NOTES
"Subjective:   Patient ID:  Violet Swenson is a 84 y.o. female who presents for follow up of No chief complaint on file.      HPI  9/17/2020   81 yo female with ;ymphoma  S/p pcae cad s/p stent ckd heart failure ios here for f/u she has low back pain limited has had recurrent episodes of intrascapular pain radiating to left arm feels like muscle spasm none with exertion. No chf symptoms no leg swelling tia claudication chest pain syncope near syncope/.no palpitation. Has some improvement in appetite  .  11/16  Violet Swenson is a 80 y.o. female patient with a h/o anemia, anxiety, atrial flutter, lymphoma large cell B, CHF, CAD, depression, GERD, gout, heart failure, HLD, HTN, hypothyroidism kidney disease, lung nodule, obesity, metronic pacemaker, polyneuropathy, who presents to the Emergency Department for evaluation of fatigue which onset this morning. Per [facility], the pt has been intermittently stuttering her speech and was hypoglycemic upon arriving on scene. Pt's daughter states that the pt woke up and was chatting to her before she left the house around 0800 this morning. The pt reports falling back asleep and waking up around 0930 feeling very weak and fatigued. She states, "when I rolled over after waking up, it felt like someone was pushing me back in bed." Associated sxs include R arm weakness, generalized weakness, slurred speech, and trouble ambulating. Patient denies any fever, SOB, CP, n/v, numbness, dizziness, and all other sxs at this time. In the ED, H/H 8.9/28.6, Creatinine 1.5, , TSH 4.087, CT head with no acute findings. Tele-stroke recommended MRI/MRA brain, MRA neck lipid panel, hemoglobin A1c. Add ASA to Plavix if x 30 days if no contraindication. Atorvastatin 40 mg daily. Neurology, PT/speech consults. Patient placed in observation for CVA rule out under the care of hospital medicine.      * No surgery found *       Hospital Course:   Place an observation for " evaluation and treatment of right arm weakness, slurred speech, and gait instability. Symptoms concerning for acute stroke. Initially perform a CT of the head which showed no acute findings. Patient was unable to perform an MRI or MRA due to having an implantable device. Empirically treated for transient ischemic attack versus stroke. Performed an interval CT scan of the head which was also negative. She was evaluated by neurology who assisted with management. Neurology determined that even though we were unable to get positive imaging findings that she should be treated as likely having had an acute stroke.  Physical and occupational therapy evaluated the patient and recommended outpatient therapy. Speech language pathology evaluated the patient and recommended no restrictions. Discharge plan to return home and continue medication plan outpatient physical therapy and follow-up with primary care as needed         11/27/2020  Had chest pain since yesterday it is tight all over chest no associated shortness of breath. Her bp is elevated she cannot get comfortable feels like tightness . Has no leg swelling her speech and vision improved but he rrt sided weakness still evident she is taking her antiplatelet. She has not taken her meds this  Morning. She is not feeling good during the vist the pain has been ongoing since 4 pm yesterday. I gave her a s/l nitro and she improved.      12/14/2020     Here for f/u after hospital admit. She r/o for mi had cardiolite negative for ischemia. Her medical therapy was adjusted taken off gabapentin she has her aches back all over . She is not able to sleep at nite. No further cardiac symptomatology.     6/7/2021  Here for f/u her bp was elevated she ate salt with watermelon has no new complaints of leg swelling shortness of breath tia claudication syncope near syncope no blurred vision. She is only n lopressor half pill bid due yo hypotension/.      11/4/2021   Has been having pnd  over the past 2 weeks she wakes up short of breath has to get up move around. Has also some shortness of breath during the day. She has no palpitation. Has  No leg swelling her bp has been elevated. Has  Been compliant with diet. Has continous chest tightness.has no exertional angina. The chest pain is chronic she had is the reason she was sent top er last year. cardiolite was negative.      11/5/2021   Pacer interrogation showed afib since september 20 nitropach added this morning her bp is elevated had her echo today no labs done .she is on asa and plavix no arb      11/24/2021   ADMITTED TO [facility] MEDICAL TEHRAPY ADJUSTED PLACED ON ELIQUIS DIURTETICS HAD SIGNIFICANT MITRAL REGURGITATION HAD HELGA CARDIOVERSION TO SR HAD CARDIOLITE WAS NEGATIVE    HER CHEST PAIN SHORTNESS OF BREATH RESOLVED SHE IS COMPLIANT WITH MEDS HTN CONTROLLED NO DIZZINESS LIGHTHEADEDNESS.      3/3/2022  Here frof /u no new complaints of chest pain shortness of breath palpitation syncope near syncope compliant with meds. Had falls twice uses her walker. No chf symptoms opr angina.     5/8/2023   haD LEG SWELLING NOTHING IN CHANGE OF EATING HABITS.SHE HAS BEEN EVALUATED WITH NEPHROLOGY HEPATOLOGY AND RHEUMATOLOGY  WAS TOLD IT IS CARDIAC SAW DR EL HER LASIX WAS DOUBLED. SHE LOST 17 LBS SHE FEELS MUCH BETTER LEG SWELLING RESOLVED. SHE HAS NO CHEST PAIN. HER EF IS UNCHANGED. SHE STOPPED LYRICAL.     6/22/2023   Has still some residual exertional shortness her weight has been stable her cardiolite is negative she is in afib since 10/22 she is being followed by ep soon. She is compliant with salt intake.      Update 10/27/2023 :  Continues to have arthritic complaints.  No cardiovascular complaints at this time.  Feels more energetic since pacemaker was implanted.  Still has to mind her sleep schedule whenever she plans to an active day.     I have reviewed the actual image of the ECG tracing obtained today and it shows AF with  complete heart block and his pacing with minimal local nonspecific capture, an apparent HV of 40 milliseconds and clear-cut conduction across the his Purkinje system.     Patient's device (Tri PPM with His lead )was fully evaluated today under my direct supervision and real-time feedback.  Summary of findings are as listed below:  Device is in good repair.   The battery is not near JESSICA.  The his sensing and pacing thresholds are favorable with well maintained safety margins.   In persistent AFib with complete heart block.  There were no device data indicating possible ongoing fluid retention.    Recommendation:  Device follow up as per clinic routine with remote and in house checks        1/4/2024   Here for f/u has multiple social issues selling house lost  logistics of moving seems clinically depressed. Has been having elevated bp was placed on amlodipine has been having leg swelling for 3 weeks before started amlodipine she is not on arb   Has not been taking her nitropach gets intermittent chest pain uses s/l nitro.she is not sure she is taking plavix not taking pravastatin.     4/4/2024  Here for f/u has been taking meds  compliant with diet has been under stress with her selling house and social issues with kids. Denies any other cardiac symptoms.  Past Medical History:   Diagnosis Date    Age-related osteoporosis without current pathological fracture 8/20/2018    Anemia     Anxiety     Arthritis     Atrial flutter     Cancer     lymphoma Large cell B    CHF (congestive heart failure)     Chronic anemia 4/26/2017    Chronic midline low back pain with right-sided sciatica 8/20/2018    Coronary artery disease     01/2015 [facility] patent LCX. 50% stenosis in LAD and RCA.      Depression     Disorder of kidney and ureter     Encounter for blood transfusion     GERD (gastroesophageal reflux disease)     Gout, arthritis     Heart failure     Hx of psychiatric care     Hyperlipidemia     Hypertension      Hypothyroidism     Immune deficiency disorder     Kidney disease     Lung nodule 2014    RML--stable    Obesity     Pacemaker     Metronic    Paroxysmal atrial fibrillation 3/15/2018    Pneumonia     Polyneuropathy     chemo induced    Psychiatric problem     Rheumatoid arthritis involving multiple sites with positive rheumatoid factor 4/19/2023    Stroke 11/16/2020    Tobacco dependence     quit 1976    Trouble in sleeping     Unstable angina 11/27/2020       Past Surgical History:   Procedure Laterality Date    APPENDECTOMY  1966 approx    CARDIAC PACEMAKER PLACEMENT  01/22/2015    CHOLECYSTECTOMY  1993    incidental at time of gastric bypass    COLON SURGERY Right 2017    hemicolectomy    COLONOSCOPY N/A 4/6/2017    Procedure: COLONOSCOPY;  Surgeon: Tye Enamorado MD;  Location: [facility];  Service: Endoscopy;  Laterality: N/A;    COLONOSCOPY N/A 11/28/2018    Procedure: COLONOSCOPY;  Surgeon: Saúl Arthur III, MD;  Location: [facility];  Service: Endoscopy;  Laterality: N/A;    CORONARY ANGIOPLASTY  02/2014    CORONARY STENT PLACEMENT  02/05/2014    ESOPHAGOGASTRODUODENOSCOPY N/A 11/28/2018    Procedure: EGD (ESOPHAGOGASTRODUODENOSCOPY);  Surgeon: Saúl Arthur III, MD;  Location: [facility];  Service: Endoscopy;  Laterality: N/A;    GASTRIC BYPASS  1993    with incidental choly    HERNIA REPAIR      IMPLANTATION OF BIVENTRICULAR PERMANENT PACEMAKER AS UPGRADE TO EXISTING PACEMAKER Left 7/13/2023    Procedure: Biventricular pacemaker upgrade/His lead vs CRT-P;  Surgeon: Velasquez Vizcaino MD;  Location: [facility] CATH LAB;  Service: Cardiology;  Laterality: Left;  Will need to upgrade her pacemaker.  Ideally his pacing lead would be the best approach. MDT/ Alternatively, an upgrade /His lead as Plan A  and  CRT if fails/notified MDT rep Arabella  NOT MRI safe   Pacer and leads implanted    INJECTION OF ANESTHETIC AGENT INTO SACROILIAC JOINT Right 10/8/2020    Procedure: Right BLOCK, SACROILIAC JOINT  "with RN IV sedation;  Surgeon: Madi Anton MD;  Location: [facility];  Service: Pain Management;  Laterality: Right;    JOINT REPLACEMENT Bilateral     3 months apart    TONSILLECTOMY      VENOGRAM, CATH LAB Bilateral 2023    Procedure: Venogram, Cath Lab;  Surgeon: Velasquez Vizcaino MD;  Location: [facility] CATH LAB;  Service: Cardiology;  Laterality: Bilateral;  1:00PM arrival time  NOT MRI safe   Pacer and leads implanted by [facility], 1/22/15   MDTR SEDR01 Sensia, [id number]   A lead: SAMEER 5076 CapSure Fix Novus, [id number]   RV lead: SAMEER 5076 CapSure Fix Novus, [id number]       Social History     Tobacco Use    Smoking status: Former     Current packs/day: 0.00     Average packs/day: 2.0 packs/day for 6.0 years (12.0 ttl pk-yrs)     Types: Cigarettes     Start date: 3/15/1970     Quit date: 3/15/1976     Years since quittin.0    Smokeless tobacco: Never   Substance Use Topics    Alcohol use: No     Alcohol/week: 0.0 standard drinks of alcohol    Drug use: No       Family History   Problem Relation Age of Onset    Heart disease Mother     Hypertension Mother     Cataracts Mother     Stomach cancer Father         "ulcers that turned to cancer"    Cancer Father         stomach    Pancreatic cancer Sister     Cancer Sister         pancreatic    Leukemia Brother         "leukemia which led to intestinal cancer"    Cataracts Brother     Cancer Brother         leukemia then later stomach cancer    Drug abuse Son     Cancer Son         prostate cancer    Prostate cancer Son     COPD Daughter     Asthma Daughter     Hypertension Maternal Grandfather     Stroke Maternal Grandfather     Alcohol abuse Neg Hx     Diabetes Neg Hx     Intellectual disability Neg Hx     Mental illness Neg Hx        Current Outpatient Medications   Medication Sig    acetaminophen (TYLENOL ARTHRITIS PAIN) 650 MG TbSR Take 650 mg by mouth every 8 (eight) hours.    allopurinoL (ZYLOPRIM) 300 MG tablet Take 1 tablet (300 mg total) " by mouth once daily.    ascorbic acid, vitamin C, (VITAMIN C) 100 MG tablet Take by mouth once daily.    azelastine (ASTELIN) 137 mcg (0.1 %) nasal spray 1 spray (137 mcg total) by Nasal route 2 (two) times daily.    busPIRone (BUSPAR) 7.5 MG tablet Take 1 tablet (7.5 mg total) by mouth 2 (two) times daily.    calcitRIOL (ROCALTROL) 0.25 MCG Cap TAKE ONE CAPSULE BY MOUTH EVERY MONDAY, WEDNESDAY AND FRIDAY    celecoxib (CELEBREX) 100 MG capsule Take 1 capsule (100 mg total) by mouth 2 (two) times daily.    clopidogreL (PLAVIX) 75 mg tablet Take 75 mg by mouth once daily.    diclofenac sodium 1 % Gel Apply 2 g topically once daily. Apply 2 g over painful joints once or twice a day.    DIPRIVAN 10 mg/mL infusion     DULoxetine (CYMBALTA) 30 MG capsule TAKE ONE CAPSULE BY MOUTH EVERY DAY    ELIQUIS 2.5 mg Tab TAKE ONE TABLET BY MOUTH TWICE DAILY    fluticasone propionate (FLONASE) 50 mcg/actuation nasal spray 2 sprays (100 mcg total) by Each Nostril route once daily.    furosemide (LASIX) 40 MG tablet Take 1 tablet (40 mg total) by mouth once daily. 1 every day, then 2 tablets every other day    glucosamine-D3-Boswellia serr (OSTEO BI-FLEX, 5-LOXIN,) 1,500-400-100 mg-unit-mg Tab Take by mouth.    hydroxychloroquine (PLAQUENIL) 200 mg tablet Take 1 tablet (200 mg total) by mouth every other day.    iron-vitamin C 100-250 mg, ICAR-C, 100-250 mg Tab TAKE ONE TABLET BY MOUTH EVERY DAY    levocetirizine (XYZAL) 5 MG tablet TAKE ONE TABLET BY MOUTH EVERY EVENING    levothyroxine (SYNTHROID) 100 MCG tablet Take 1 tablet (100 mcg total) by mouth before breakfast.    metoprolol tartrate (LOPRESSOR) 25 MG tablet TAKE ONE TABLET BY MOUTH TWICE DAILY    multivitamin (THERAGRAN) per tablet Take 1 tablet by mouth once daily.    olmesartan (BENICAR) 20 MG tablet Take 1 tablet (20 mg total) by mouth once daily.    pravastatin (PRAVACHOL) 40 MG tablet Take 1 tablet (40 mg total) by mouth once daily.    pregabalin (LYRICA) 50 MG capsule  Take 50 mg by mouth 3 (three) times daily.    sertraline (ZOLOFT) 100 MG tablet Take 1.5 tablets (150 mg total) by mouth once daily.    sodium bicarbonate 650 MG tablet TAKE ONE TABLET BY MOUTH TWICE DAILY    tumeric-ging-olive-oreg-capryl 100 mg-150 mg- 50 mg-150 mg Cap Take by mouth.    VITAMIN D2 1,250 mcg (50,000 unit) capsule TAKE ONE CAPSULE BY MOUTH EVERY 7 DAYS    ZINC ACETATE ORAL Take 250 mg by mouth once daily.      Current Facility-Administered Medications   Medication    denosumab (PROLIA) injection 60 mg     Current Outpatient Medications on File Prior to Visit   Medication Sig    acetaminophen (TYLENOL ARTHRITIS PAIN) 650 MG TbSR Take 650 mg by mouth every 8 (eight) hours.    allopurinoL (ZYLOPRIM) 300 MG tablet Take 1 tablet (300 mg total) by mouth once daily.    ascorbic acid, vitamin C, (VITAMIN C) 100 MG tablet Take by mouth once daily.    azelastine (ASTELIN) 137 mcg (0.1 %) nasal spray 1 spray (137 mcg total) by Nasal route 2 (two) times daily.    busPIRone (BUSPAR) 7.5 MG tablet Take 1 tablet (7.5 mg total) by mouth 2 (two) times daily.    calcitRIOL (ROCALTROL) 0.25 MCG Cap TAKE ONE CAPSULE BY MOUTH EVERY MONDAY, WEDNESDAY AND FRIDAY    celecoxib (CELEBREX) 100 MG capsule Take 1 capsule (100 mg total) by mouth 2 (two) times daily.    clopidogreL (PLAVIX) 75 mg tablet Take 75 mg by mouth once daily.    diclofenac sodium 1 % Gel Apply 2 g topically once daily. Apply 2 g over painful joints once or twice a day.    DIPRIVAN 10 mg/mL infusion     DULoxetine (CYMBALTA) 30 MG capsule TAKE ONE CAPSULE BY MOUTH EVERY DAY    ELIQUIS 2.5 mg Tab TAKE ONE TABLET BY MOUTH TWICE DAILY    fluticasone propionate (FLONASE) 50 mcg/actuation nasal spray 2 sprays (100 mcg total) by Each Nostril route once daily.    furosemide (LASIX) 40 MG tablet Take 1 tablet (40 mg total) by mouth once daily. 1 every day, then 2 tablets every other day    glucosamine-D3-Boswellia serr (OSTEO BI-FLEX, 5-LOXIN,) 1,500-400-100  mg-unit-mg Tab Take by mouth.    hydroxychloroquine (PLAQUENIL) 200 mg tablet Take 1 tablet (200 mg total) by mouth every other day.    iron-vitamin C 100-250 mg, ICAR-C, 100-250 mg Tab TAKE ONE TABLET BY MOUTH EVERY DAY    levocetirizine (XYZAL) 5 MG tablet TAKE ONE TABLET BY MOUTH EVERY EVENING    levothyroxine (SYNTHROID) 100 MCG tablet Take 1 tablet (100 mcg total) by mouth before breakfast.    metoprolol tartrate (LOPRESSOR) 25 MG tablet TAKE ONE TABLET BY MOUTH TWICE DAILY    multivitamin (THERAGRAN) per tablet Take 1 tablet by mouth once daily.    olmesartan (BENICAR) 20 MG tablet Take 1 tablet (20 mg total) by mouth once daily.    pravastatin (PRAVACHOL) 40 MG tablet Take 1 tablet (40 mg total) by mouth once daily.    pregabalin (LYRICA) 50 MG capsule Take 50 mg by mouth 3 (three) times daily.    sertraline (ZOLOFT) 100 MG tablet Take 1.5 tablets (150 mg total) by mouth once daily.    sodium bicarbonate 650 MG tablet TAKE ONE TABLET BY MOUTH TWICE DAILY    tumeric-ging-olive-oreg-capryl 100 mg-150 mg- 50 mg-150 mg Cap Take by mouth.    VITAMIN D2 1,250 mcg (50,000 unit) capsule TAKE ONE CAPSULE BY MOUTH EVERY 7 DAYS    ZINC ACETATE ORAL Take 250 mg by mouth once daily.      Current Facility-Administered Medications on File Prior to Visit   Medication    denosumab (PROLIA) injection 60 mg     Review of patient's allergies indicates:   Allergen Reactions    Corticosteroids (glucocorticoids) Nausea Only and Other (See Comments)     Stomach pain, dizziness, headache    Oxycodone Other (See Comments)     Blood pressure dropped      Review of Systems   Constitutional: Negative for diaphoresis, malaise/fatigue and weight gain.   HENT:  Negative for hoarse voice.    Eyes:  Negative for double vision and visual disturbance.   Cardiovascular:  Negative for chest pain, claudication, cyanosis, dyspnea on exertion, irregular heartbeat, leg swelling, near-syncope, orthopnea, palpitations, paroxysmal nocturnal dyspnea and  syncope.   Respiratory:  Negative for cough, hemoptysis, shortness of breath and snoring.    Hematologic/Lymphatic: Negative for bleeding problem. Does not bruise/bleed easily.   Skin:  Negative for color change and poor wound healing.   Musculoskeletal:  Negative for muscle cramps, muscle weakness and myalgias.   Gastrointestinal:  Negative for bloating, abdominal pain, change in bowel habit, diarrhea, heartburn, hematemesis, hematochezia, melena and nausea.   Neurological:  Negative for excessive daytime sleepiness, dizziness, headaches, light-headedness, loss of balance, numbness and weakness.   Psychiatric/Behavioral:  Negative for memory loss. The patient does not have insomnia.    Allergic/Immunologic: Negative for hives.       Objective:   Physical Exam  Vitals and nursing note reviewed.   Constitutional:       General: She is not in acute distress.     Appearance: Normal appearance. She is well-developed. She is not ill-appearing.   HENT:      Head: Normocephalic and atraumatic.   Eyes:      General: No scleral icterus.     Pupils: Pupils are equal, round, and reactive to light.   Neck:      Thyroid: No thyromegaly.      Vascular: Normal carotid pulses. No carotid bruit, hepatojugular reflux or JVD.      Trachea: No tracheal deviation.   Cardiovascular:      Rate and Rhythm: Normal rate and regular rhythm.      Pulses: Normal pulses.      Heart sounds: Normal heart sounds. No murmur heard.     No friction rub. No gallop.   Pulmonary:      Effort: Pulmonary effort is normal. No respiratory distress.      Breath sounds: Normal breath sounds. No wheezing, rhonchi or rales.      Comments: Pacer site well healed.  Chest:      Chest wall: No tenderness.   Abdominal:      General: Bowel sounds are normal. There is no abdominal bruit.      Palpations: Abdomen is soft. There is no hepatomegaly or pulsatile mass.      Tenderness: There is no abdominal tenderness.   Musculoskeletal:      Right shoulder: No deformity.  "     Cervical back: Normal range of motion and neck supple.      Right lower leg: No edema.      Left lower leg: No edema.   Skin:     General: Skin is warm and dry.      Findings: No erythema or rash.      Nails: There is no clubbing.   Neurological:      Mental Status: She is alert and oriented to person, place, and time.      Cranial Nerves: No cranial nerve deficit.      Coordination: Coordination normal.   Psychiatric:         Mood and Affect: Mood normal.         Speech: Speech normal.         Behavior: Behavior normal.       Vitals:    04/04/24 1215 04/04/24 1221   BP: (!) 164/100 (!) 162/100   BP Location: Left arm Right arm   Patient Position: Sitting Sitting   BP Method: Small (Manual) Small (Manual)   Pulse: (!) 48    Weight: 58.1 kg (128 lb 1.4 oz)    Height: 5' 4" (1.626 m)      Lab Results   Component Value Date    CHOL 164 12/28/2023    CHOL 139 06/16/2023    CHOL 153 03/30/2022      Body mass index is 21.99 kg/m².   Lab Results   Component Value Date    HGBA1C 4.9 11/16/2020      BMP  Lab Results   Component Value Date     03/08/2024    K 5.1 03/08/2024     (H) 03/08/2024    CO2 17 (L) 03/08/2024    BUN 35 (H) 03/08/2024    CREATININE 1.4 03/08/2024    CALCIUM 9.4 03/08/2024    ANIONGAP 8 03/08/2024    EGFRNORACEVR 37.3 (A) 03/08/2024      Lab Results   Component Value Date    HDL 62 12/28/2023    HDL 63 06/16/2023    HDL 67 03/30/2022     Lab Results   Component Value Date    LDLCALC 74.2 12/28/2023    LDLCALC 56.6 (L) 06/16/2023    LDLCALC 69.8 03/30/2022     Lab Results   Component Value Date    TRIG 139 12/28/2023    TRIG 97 06/16/2023    TRIG 81 03/30/2022     Lab Results   Component Value Date    CHOLHDL 37.8 12/28/2023    CHOLHDL 45.3 06/16/2023    CHOLHDL 43.8 03/30/2022       Chemistry        Component Value Date/Time     03/08/2024 1043    K 5.1 03/08/2024 1043     (H) 03/08/2024 1043    CO2 17 (L) 03/08/2024 1043    BUN 35 (H) 03/08/2024 1043    CREATININE 1.4 " 03/08/2024 1043    GLU 75 03/08/2024 1043        Component Value Date/Time    CALCIUM 9.4 03/08/2024 1043    ALKPHOS 48 (L) 12/28/2023 0838    AST 39 12/28/2023 0838    ALT 39 12/28/2023 0838    BILITOT 0.4 12/28/2023 0838    ESTGFRAFRICA 35 (A) 05/31/2022 0909    EGFRNONAA 30 (A) 05/31/2022 0909          Lab Results   Component Value Date    TSH 5.248 (H) 01/22/2024     Lab Results   Component Value Date    INR 1.0 06/23/2023    INR 1.0 04/03/2023    INR 0.9 11/07/2021     Lab Results   Component Value Date    WBC 7.24 12/28/2023    HGB 11.3 (L) 12/28/2023    HCT 36.1 (L) 12/28/2023     (H) 12/28/2023     12/28/2023     BMP  Sodium   Date Value Ref Range Status   03/08/2024 139 136 - 145 mmol/L Final     Potassium   Date Value Ref Range Status   03/08/2024 5.1 3.5 - 5.1 mmol/L Final     Chloride   Date Value Ref Range Status   03/08/2024 114 (H) 95 - 110 mmol/L Final     CO2   Date Value Ref Range Status   03/08/2024 17 (L) 23 - 29 mmol/L Final     BUN   Date Value Ref Range Status   03/08/2024 35 (H) 8 - 23 mg/dL Final     Creatinine   Date Value Ref Range Status   03/08/2024 1.4 0.5 - 1.4 mg/dL Final     Calcium   Date Value Ref Range Status   03/08/2024 9.4 8.7 - 10.5 mg/dL Final     Anion Gap   Date Value Ref Range Status   03/08/2024 8 8 - 16 mmol/L Final     eGFR if    Date Value Ref Range Status   05/31/2022 35 (A) >60 mL/min/1.73 m^2 Final     eGFR if non    Date Value Ref Range Status   05/31/2022 30 (A) >60 mL/min/1.73 m^2 Final     Comment:     Calculation used to obtain the estimated glomerular filtration  rate (eGFR) is the CKD-EPI equation.        CrCl cannot be calculated (Patient's most recent lab result is older than the maximum 7 days allowed.).    Assessment:     1. Coronary artery disease : multiple vessels, s/p PTCA    2. Essential hypertension    3. Mixed hyperlipidemia    4. Hypothyroidism (acquired)    5. Ischemic cardiomyopathy    6. Chronic  anemia    7. S/P gastric bypass    8. Chemotherapy-induced neuropathy    9. S/P TKR (total knee replacement), bilateral    10. Diffuse large B-cell lymphoma, unspecified body region    11. Atherosclerosis of aorta    12. Stage 3b chronic kidney disease    13. Paroxysmal atrial fibrillation    14. Cardiac pacemaker in situ    15. Chronic systolic congestive heart failure    16. Atherosclerosis of native coronary artery of native heart with stable angina pectoris    17. Centromere antibody positive    18. Rheumatoid arthritis involving multiple sites with positive rheumatoid factor    Cad s/p pci asymptomatic on appropriate therapy continue the same stable rf modification discussed  Htn not well controlled an element of stress salt non compliance will increase olmesartan to 40 mg po daily.has been eating a lot of canned soup.    Hlp on target continue same stable  Afib chb s/p pacer adequately functioning follows in pacer clinic  Afib on eliquis no bleeding continue same no tia.   Atherosclerosis aorta on plavix statins asymptomatic continue same.   Ckd stage 3 stable observe clinically serial labs. Htn control emphasized.  Hypothyroidism on supplement on target continue same.   Plan:     Continue current therapy  Cardiac low salt diet.  Risk factor modification and excercise program.  F/u in 6 months with lipid cmp

## 2024-04-13 DIAGNOSIS — G62.9 POLYNEUROPATHY, UNSPECIFIED: ICD-10-CM

## 2024-04-13 DIAGNOSIS — M89.49 OTHER HYPERTROPHIC OSTEOARTHROPATHY, MULTIPLE SITES: ICD-10-CM

## 2024-04-13 DIAGNOSIS — E55.9 VITAMIN D DEFICIENCY: ICD-10-CM

## 2024-04-13 DIAGNOSIS — I95.1 POSTURAL HYPOTENSION: ICD-10-CM

## 2024-04-15 RX ORDER — ERGOCALCIFEROL 1.25 1/1
CAPSULE ORAL
Qty: 12 CAPSULE | Refills: 11 | Status: SHIPPED | OUTPATIENT
Start: 2024-04-15

## 2024-04-15 RX ORDER — DULOXETIN HYDROCHLORIDE 30 MG/1
CAPSULE, DELAYED RELEASE ORAL
Qty: 30 CAPSULE | Refills: 11 | Status: SHIPPED | OUTPATIENT
Start: 2024-04-15

## 2024-04-17 DIAGNOSIS — I10 ESSENTIAL HYPERTENSION: ICD-10-CM

## 2024-04-17 RX ORDER — METOPROLOL TARTRATE 25 MG/1
TABLET, FILM COATED ORAL
Qty: 60 TABLET | Refills: 6 | Status: SHIPPED | OUTPATIENT
Start: 2024-04-17

## 2024-04-17 RX ORDER — AMLODIPINE BESYLATE 5 MG/1
5 TABLET ORAL DAILY
Qty: 30 TABLET | Refills: 1 | OUTPATIENT
Start: 2024-04-17 | End: 2025-04-17

## 2024-04-17 NOTE — TELEPHONE ENCOUNTER
No care due was identified.  [facility] Embedded Care Due Messages. Reference number: [id number].   4/17/2024 1:15:56 PM CDT

## 2024-04-24 ENCOUNTER — INFUSION (OUTPATIENT)
Dept: INFUSION THERAPY | Facility: HOSPITAL | Age: 84
End: 2024-04-24
Attending: INTERNAL MEDICINE
Payer: MEDICARE

## 2024-04-24 ENCOUNTER — OFFICE VISIT (OUTPATIENT)
Dept: RHEUMATOLOGY | Facility: CLINIC | Age: 84
End: 2024-04-24
Payer: MEDICARE

## 2024-04-24 VITALS
WEIGHT: 129.44 LBS | BODY MASS INDEX: 22.1 KG/M2 | HEART RATE: 80 BPM | HEIGHT: 64 IN | SYSTOLIC BLOOD PRESSURE: 127 MMHG | BODY MASS INDEX: 22.1 KG/M2 | HEIGHT: 64 IN | DIASTOLIC BLOOD PRESSURE: 87 MMHG | WEIGHT: 129.44 LBS

## 2024-04-24 DIAGNOSIS — M81.0 AGE-RELATED OSTEOPOROSIS WITHOUT CURRENT PATHOLOGICAL FRACTURE: Primary | ICD-10-CM

## 2024-04-24 DIAGNOSIS — M1A.09X0 IDIOPATHIC CHRONIC GOUT, MULTIPLE SITES, WITHOUT TOPHUS (TOPHI): ICD-10-CM

## 2024-04-24 DIAGNOSIS — M06.4 UNDIFFERENTIATED INFLAMMATORY ARTHRITIS: ICD-10-CM

## 2024-04-24 PROCEDURE — 3079F DIAST BP 80-89 MM HG: CPT | Mod: CPTII,S$GLB,, | Performed by: INTERNAL MEDICINE

## 2024-04-24 PROCEDURE — 99999 PR PBB SHADOW E&M-EST. PATIENT-LVL III: CPT | Mod: PBBFAC,,, | Performed by: INTERNAL MEDICINE

## 2024-04-24 PROCEDURE — 1126F AMNT PAIN NOTED NONE PRSNT: CPT | Mod: CPTII,S$GLB,, | Performed by: INTERNAL MEDICINE

## 2024-04-24 PROCEDURE — 1159F MED LIST DOCD IN RCRD: CPT | Mod: CPTII,S$GLB,, | Performed by: INTERNAL MEDICINE

## 2024-04-24 PROCEDURE — 1101F PT FALLS ASSESS-DOCD LE1/YR: CPT | Mod: CPTII,S$GLB,, | Performed by: INTERNAL MEDICINE

## 2024-04-24 PROCEDURE — 99214 OFFICE O/P EST MOD 30 MIN: CPT | Mod: S$GLB,,, | Performed by: INTERNAL MEDICINE

## 2024-04-24 PROCEDURE — 1157F ADVNC CARE PLAN IN RCRD: CPT | Mod: CPTII,S$GLB,, | Performed by: INTERNAL MEDICINE

## 2024-04-24 PROCEDURE — 96372 THER/PROPH/DIAG INJ SC/IM: CPT

## 2024-04-24 PROCEDURE — 3288F FALL RISK ASSESSMENT DOCD: CPT | Mod: CPTII,S$GLB,, | Performed by: INTERNAL MEDICINE

## 2024-04-24 PROCEDURE — 3074F SYST BP LT 130 MM HG: CPT | Mod: CPTII,S$GLB,, | Performed by: INTERNAL MEDICINE

## 2024-04-24 PROCEDURE — 1160F RVW MEDS BY RX/DR IN RCRD: CPT | Mod: CPTII,S$GLB,, | Performed by: INTERNAL MEDICINE

## 2024-04-24 PROCEDURE — 63600175 PHARM REV CODE 636 W HCPCS: Mod: JZ,JG | Performed by: INTERNAL MEDICINE

## 2024-04-24 RX ORDER — HYDROXYCHLOROQUINE SULFATE 200 MG/1
200 TABLET, FILM COATED ORAL EVERY OTHER DAY
Qty: 45 TABLET | Refills: 1 | Status: SHIPPED | OUTPATIENT
Start: 2024-04-24

## 2024-04-24 RX ADMIN — DENOSUMAB 60 MG: 60 INJECTION SUBCUTANEOUS at 10:04

## 2024-04-24 NOTE — PROGRESS NOTES
RHEUMATOLOGY CLINIC FOLLOW UP VISIT  Chief complaints, HPI, ROS, EXAM, Assessment & Plans:-  Violet Zhao a 84 y.o. pleasant female comes in for follow-up visit.  She follows in the Rheumatology Clinic for osteoporosis and gout.   She denies any significant pain over small joints of bilateral hands.  No trauma or injury.  Rheumatological review of system negative otherwise.  Physical examination shows no synovitis of small joints..  Crepitus of large joints present.   1. Age-related osteoporosis without current pathological fracture    2. Idiopathic chronic gout, multiple sites, without tophus (tophi)    3. Undifferentiated inflammatory arthritis      Problem List Items Addressed This Visit       Age-related osteoporosis without current pathological fracture - Primary    Idiopathic chronic gout, multiple sites, without tophus (tophi)    Overview     Continue allopurinol         Undifferentiated inflammatory arthritis    Relevant Medications    hydroxychloroquine (PLAQUENIL) 200 mg tablet       Labs reviewed today:-   Latest Reference Range & Units 03/08/24 10:43   Sodium 136 - 145 mmol/L 139   Potassium 3.5 - 5.1 mmol/L 5.1   Chloride 95 - 110 mmol/L 114 (H)   CO2 23 - 29 mmol/L 17 (L)   Anion Gap 8 - 16 mmol/L 8   BUN 8 - 23 mg/dL 35 (H)   Creatinine 0.5 - 1.4 mg/dL 1.4   eGFR >60 mL/min/1.73 m^2 37.3 !   Glucose 70 - 110 mg/dL 75   Calcium 8.7 - 10.5 mg/dL 9.4   (H): Data is abnormally high  (L): Data is abnormally low  !: Data is abnormal      Severe osteoporosis on Prolia.  High risk for hypocalcemia.  Prolia today and repeat CMP in 10 days.  Low-grade seropositive rheumatoid on Plaquenil-renally dosed at every other day.   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  Monitor.  Yearly Plaquenil retinal clearance by ophthalmologist advised.  I have explained all of the above in detail and the patient understands all of the  current recommendation(s). I have answered all questions to the best of my ability and to their complete satisfaction.         # Follow up in about 6 months (around 10/24/2024).      Disclaimer: This note was prepared using voice recognition system and is likely to have sound alike errors and is not proof read.  Please call me with any questions.

## 2024-04-24 NOTE — NURSING
1042: Prolia 60 mg q 6 months  Last dose given-10/24/23    Any invasive dental procedures in past 3 months or upcoming 3 months: No    Last Rheumatology provider visit- Seen by Dr. Rogers on 04/24/24    Recent labs? Yes;  CKD pt needing repeat labs in 10 days-No   Lab Results   Component Value Date    CALCIUM 9.2 04/24/2024   9.2  Lab Results   Component Value Date    CREATININE 1.8 (H) 04/24/2024   1.8  Lab Results   Component Value Date    ESTGFRAFRICA 35 (A) 05/31/2022   N/A  Lab Results   Component Value Date    EGFRNONAA 30 (A) 05/31/2022   28  Lab Results   Component Value Date    XVAHQKRY40PL 33 12/28/2023   N/A       Lot #- [id number]  Expiration Date- 06/30/26      Prolia 60 mg/ml administered SQ to Right upper arm. Tolerated without any complaints. No redness, swelling, or drainage noted to site. Instructed to remain in clinic 15 minutes after administration to monitor for any s/sx of reaction. Pt instructed on signs and symptoms of reaction to report. Verbalizes understanding.

## 2024-04-24 NOTE — DISCHARGE INSTRUCTIONS
Thank you for allowing me to care for you today,  KRISTIAN BañuelosN, RN    [facility]  [address] [address]  [phone] phone     [phone] fax  Hours of Operation: Monday- Friday 7:00am- 5:30pm    FALL PREVENTION   Falls often occur due to slipping, tripping or losing your balance. Here are ways to reduce your risk of falling again.   Was there anything that caused your fall that can be fixed, removed or replaced?   Make your home safe by keeping walkways clear of objects you may trip over.   Use non-slip pads under rugs.   Do not walk in poorly lit areas.   Do not stand on chairs or wobbly ladders.   Use caution when reaching overhead or looking upward. This position can cause a loss of balance.   Be sure your shoes fit properly, have non-slip bottoms and are in good condition.   Be cautious when going up and down stairs, curbs, and when walking on uneven sidewalks.   If your balance is poor, consider using a cane or walker.   If your fall was related to alcohol use, stop or limit alcohol intake.   If your fall was related to use of sleeping medicines, talk to your doctor about this. You may need to reduce your dosage at bedtime if you awaken during the night to go to the bathroom.   To reduce the need for nighttime bathroom trips:   Avoid drinking fluids for several hours before going to bed   Empty your bladder before going to bed   Men can keep a urinal at the bedside   © 5563-0775 [facility], [address] [place] [address], [place] [address]. All rights reserved. This information is not intended as a substitute for professional medical care. Always follow your healthcare professional's instructions.

## 2024-05-01 NOTE — HOSPITAL COURSE
11/29/20 Patient seen and examined in room today. No complaints.  Troponin elevation trending down. Paced heart rate.  Plan cardiac cath tomorrow. Patient and family are in agreement.    11/30/2020-Patient seen and examined in room, lying in bed. Feels good today. Had near syncopal event yesterday. Plan for MPI stress test today. Further recs to follow.   
79 y/o wf admitted with a dx of unstable angina , uncontrolled HTN and CAD . Cardiology was consulted and recs LHC  This Monday , The chest pain has resolved . The cardiac enzymes are negative x 3 . The CXR  diod not show any acute finding . The BP  better control now . She is on BB , ASA ,plavix and statin .  11/29 She denies any complaint except for HA . There was no acute event overnight. She is schedule  For a [facility] tomorrow  .   11/30 Pt was seen and examined at bedside . She was determined to be suitable for d/c   -She had a cardiac stress test which show :There is no evidence of myocardial ischemia or infarction  -The case was d/w cardiology and agree to d/c home   -There was no significant event since admission . The chest pain resolve before d/c   
English

## 2024-05-14 RX ORDER — CALCITRIOL 0.25 UG/1
CAPSULE ORAL
Qty: 12 CAPSULE | Refills: 3 | Status: SHIPPED | OUTPATIENT
Start: 2024-05-14

## 2024-05-15 DIAGNOSIS — I25.5 ISCHEMIC CARDIOMYOPATHY: Chronic | ICD-10-CM

## 2024-05-15 DIAGNOSIS — I25.10 ATHEROSCLEROTIC HEART DISEASE OF NATIVE CORONARY ARTERY WITHOUT ANGINA PECTORIS: ICD-10-CM

## 2024-05-15 RX ORDER — CLOPIDOGREL BISULFATE 75 MG/1
75 TABLET ORAL
Qty: 30 TABLET | Refills: 6 | Status: SHIPPED | OUTPATIENT
Start: 2024-05-15

## 2024-05-15 RX ORDER — FUROSEMIDE 40 MG/1
TABLET ORAL
Qty: 30 TABLET | Refills: 6 | Status: SHIPPED | OUTPATIENT
Start: 2024-05-15

## 2024-06-13 DIAGNOSIS — D50.8 OTHER IRON DEFICIENCY ANEMIA: ICD-10-CM

## 2024-06-13 RX ORDER — IRON,CARBONYL/ASCORBIC ACID 100-250 MG
TABLET ORAL
Qty: 30 TABLET | Refills: 4 | Status: SHIPPED | OUTPATIENT
Start: 2024-06-13

## 2024-06-19 ENCOUNTER — HOSPITAL ENCOUNTER (OUTPATIENT)
Dept: CARDIOLOGY | Facility: HOSPITAL | Age: 84
Discharge: HOME | End: 2024-06-19
Attending: INTERNAL MEDICINE
Payer: MEDICARE

## 2024-06-19 ENCOUNTER — LAB VISIT (OUTPATIENT)
Dept: LAB | Facility: HOSPITAL | Age: 84
End: 2024-06-19
Attending: PHYSICIAN ASSISTANT
Payer: MEDICARE

## 2024-06-19 DIAGNOSIS — E55.9 VITAMIN D DEFICIENCY: ICD-10-CM

## 2024-06-19 DIAGNOSIS — I25.5 ISCHEMIC CARDIOMYOPATHY: ICD-10-CM

## 2024-06-19 DIAGNOSIS — E78.2 MIXED HYPERLIPIDEMIA: Chronic | ICD-10-CM

## 2024-06-19 DIAGNOSIS — I50.22 CHRONIC SYSTOLIC CONGESTIVE HEART FAILURE: ICD-10-CM

## 2024-06-19 DIAGNOSIS — M1A.09X0 IDIOPATHIC CHRONIC GOUT, MULTIPLE SITES, WITHOUT TOPHUS (TOPHI): ICD-10-CM

## 2024-06-19 DIAGNOSIS — Z95.0 BIVENTRICULAR CARDIAC PACEMAKER IN SITU: ICD-10-CM

## 2024-06-19 DIAGNOSIS — E03.9 HYPOTHYROIDISM (ACQUIRED): Chronic | ICD-10-CM

## 2024-06-19 DIAGNOSIS — D64.9 CHRONIC ANEMIA: Chronic | ICD-10-CM

## 2024-06-19 DIAGNOSIS — I50.32 CHRONIC DIASTOLIC HEART FAILURE: ICD-10-CM

## 2024-06-19 LAB
25(OH)D3+25(OH)D2 SERPL-MCNC: 25 NG/ML (ref 30–96)
ALBUMIN SERPL BCP-MCNC: 3.6 G/DL (ref 3.5–5.2)
ALP SERPL-CCNC: 60 U/L (ref 55–135)
ALT SERPL W/O P-5'-P-CCNC: 48 U/L (ref 10–44)
ANION GAP SERPL CALC-SCNC: 9 MMOL/L (ref 8–16)
AST SERPL-CCNC: 43 U/L (ref 10–40)
BASOPHILS # BLD AUTO: 0.03 K/UL (ref 0–0.2)
BASOPHILS NFR BLD: 0.4 % (ref 0–1.9)
BILIRUB SERPL-MCNC: 0.3 MG/DL (ref 0.1–1)
BUN SERPL-MCNC: 38 MG/DL (ref 8–23)
CALCIUM SERPL-MCNC: 9 MG/DL (ref 8.7–10.5)
CHLORIDE SERPL-SCNC: 115 MMOL/L (ref 95–110)
CHOLEST SERPL-MCNC: 116 MG/DL (ref 120–199)
CHOLEST/HDLC SERPL: 1.9 {RATIO} (ref 2–5)
CO2 SERPL-SCNC: 16 MMOL/L (ref 23–29)
CREAT SERPL-MCNC: 1.9 MG/DL (ref 0.5–1.4)
DIFFERENTIAL METHOD BLD: ABNORMAL
EOSINOPHIL # BLD AUTO: 0.2 K/UL (ref 0–0.5)
EOSINOPHIL NFR BLD: 2.4 % (ref 0–8)
ERYTHROCYTE [DISTWIDTH] IN BLOOD BY AUTOMATED COUNT: 16 % (ref 11.5–14.5)
EST. GFR  (NO RACE VARIABLE): 25.9 ML/MIN/1.73 M^2
GLUCOSE SERPL-MCNC: 85 MG/DL (ref 70–110)
HCT VFR BLD AUTO: 34.1 % (ref 37–48.5)
HDLC SERPL-MCNC: 62 MG/DL (ref 40–75)
HDLC SERPL: 53.4 % (ref 20–50)
HGB BLD-MCNC: 10.9 G/DL (ref 12–16)
IMM GRANULOCYTES # BLD AUTO: 0.05 K/UL (ref 0–0.04)
IMM GRANULOCYTES NFR BLD AUTO: 0.7 % (ref 0–0.5)
LDLC SERPL CALC-MCNC: 40.4 MG/DL (ref 63–159)
LYMPHOCYTES # BLD AUTO: 2.8 K/UL (ref 1–4.8)
LYMPHOCYTES NFR BLD: 41.4 % (ref 18–48)
MCH RBC QN AUTO: 35 PG (ref 27–31)
MCHC RBC AUTO-ENTMCNC: 32 G/DL (ref 32–36)
MCV RBC AUTO: 110 FL (ref 82–98)
MONOCYTES # BLD AUTO: 0.4 K/UL (ref 0.3–1)
MONOCYTES NFR BLD: 6.5 % (ref 4–15)
NEUTROPHILS # BLD AUTO: 3.3 K/UL (ref 1.8–7.7)
NEUTROPHILS NFR BLD: 48.6 % (ref 38–73)
NONHDLC SERPL-MCNC: 54 MG/DL
NRBC BLD-RTO: 0 /100 WBC
OHS CV AF BURDEN PERCENT: 100
OHS CV BIV PACING PERCENT: 99.4 %
OHS CV DC REMOTE DEVICE TYPE: NORMAL
OHS CV RV PACING PERCENT: 0 %
PLATELET # BLD AUTO: 205 K/UL (ref 150–450)
PMV BLD AUTO: 10.2 FL (ref 9.2–12.9)
POTASSIUM SERPL-SCNC: 4.4 MMOL/L (ref 3.5–5.1)
PROT SERPL-MCNC: 6.9 G/DL (ref 6–8.4)
RBC # BLD AUTO: 3.11 M/UL (ref 4–5.4)
SODIUM SERPL-SCNC: 140 MMOL/L (ref 136–145)
T4 FREE SERPL-MCNC: 0.75 NG/DL (ref 0.71–1.51)
TRIGL SERPL-MCNC: 68 MG/DL (ref 30–150)
TSH SERPL DL<=0.005 MIU/L-ACNC: 5.88 UIU/ML (ref 0.4–4)
URATE SERPL-MCNC: 5.6 MG/DL (ref 2.4–5.7)
WBC # BLD AUTO: 6.77 K/UL (ref 3.9–12.7)

## 2024-06-19 PROCEDURE — 84550 ASSAY OF BLOOD/URIC ACID: CPT | Mod: HCNC | Performed by: PHYSICIAN ASSISTANT

## 2024-06-19 PROCEDURE — 85025 COMPLETE CBC W/AUTO DIFF WBC: CPT | Mod: HCNC | Performed by: PHYSICIAN ASSISTANT

## 2024-06-19 PROCEDURE — 80053 COMPREHEN METABOLIC PANEL: CPT | Mod: HCNC | Performed by: PHYSICIAN ASSISTANT

## 2024-06-19 PROCEDURE — 84443 ASSAY THYROID STIM HORMONE: CPT | Mod: HCNC | Performed by: PHYSICIAN ASSISTANT

## 2024-06-19 PROCEDURE — 36415 COLL VENOUS BLD VENIPUNCTURE: CPT | Mod: HCNC | Performed by: PHYSICIAN ASSISTANT

## 2024-06-19 PROCEDURE — 80061 LIPID PANEL: CPT | Mod: HCNC | Performed by: PHYSICIAN ASSISTANT

## 2024-06-19 PROCEDURE — 93281 PM DEVICE PROGR EVAL MULTI: CPT | Mod: HCNC

## 2024-06-19 PROCEDURE — 84439 ASSAY OF FREE THYROXINE: CPT | Mod: HCNC | Performed by: PHYSICIAN ASSISTANT

## 2024-06-19 PROCEDURE — 93281 PM DEVICE PROGR EVAL MULTI: CPT | Mod: 26,HCNC,, | Performed by: INTERNAL MEDICINE

## 2024-06-19 PROCEDURE — 82306 VITAMIN D 25 HYDROXY: CPT | Mod: HCNC | Performed by: PHYSICIAN ASSISTANT

## 2024-07-12 DIAGNOSIS — F41.9 ANXIETY: ICD-10-CM

## 2024-07-12 DIAGNOSIS — E87.20 ACIDOSIS: ICD-10-CM

## 2024-07-12 RX ORDER — APIXABAN 2.5 MG/1
2.5 TABLET, FILM COATED ORAL 2 TIMES DAILY
Qty: 180 TABLET | Refills: 3 | Status: SHIPPED | OUTPATIENT
Start: 2024-07-12

## 2024-07-12 RX ORDER — SODIUM BICARBONATE 650 MG/1
TABLET ORAL
Qty: 60 TABLET | Refills: 11 | Status: SHIPPED | OUTPATIENT
Start: 2024-07-12

## 2024-07-12 NOTE — TELEPHONE ENCOUNTER
No care due was identified.  [facility] Embedded Care Due Messages. Reference number: [id number].   7/12/2024 8:05:46 AM CDT

## 2024-07-13 RX ORDER — BUSPIRONE HYDROCHLORIDE 7.5 MG/1
7.5 TABLET ORAL 2 TIMES DAILY
Qty: 60 TABLET | Refills: 0 | Status: SHIPPED | OUTPATIENT
Start: 2024-07-13

## 2024-07-25 ENCOUNTER — OFFICE VISIT (OUTPATIENT)
Dept: INTERNAL MEDICINE | Facility: CLINIC | Age: 84
End: 2024-07-25
Payer: MEDICARE

## 2024-07-25 VITALS
OXYGEN SATURATION: 95 % | BODY MASS INDEX: 21.98 KG/M2 | WEIGHT: 128.75 LBS | HEART RATE: 67 BPM | SYSTOLIC BLOOD PRESSURE: 120 MMHG | DIASTOLIC BLOOD PRESSURE: 80 MMHG | HEIGHT: 64 IN

## 2024-07-25 DIAGNOSIS — N18.4 STAGE 4 CHRONIC KIDNEY DISEASE: ICD-10-CM

## 2024-07-25 DIAGNOSIS — F33.42 RECURRENT MAJOR DEPRESSIVE DISORDER, IN FULL REMISSION: ICD-10-CM

## 2024-07-25 DIAGNOSIS — F41.9 ANXIETY: ICD-10-CM

## 2024-07-25 DIAGNOSIS — J84.10 CALCIFIED GRANULOMA OF LUNG: ICD-10-CM

## 2024-07-25 DIAGNOSIS — I10 ESSENTIAL HYPERTENSION: ICD-10-CM

## 2024-07-25 DIAGNOSIS — E03.9 HYPOTHYROIDISM (ACQUIRED): ICD-10-CM

## 2024-07-25 DIAGNOSIS — Z00.00 ROUTINE GENERAL MEDICAL EXAMINATION AT A HEALTH CARE FACILITY: Primary | ICD-10-CM

## 2024-07-25 DIAGNOSIS — D84.821 IMMUNOSUPPRESSION DUE TO DRUG THERAPY: ICD-10-CM

## 2024-07-25 DIAGNOSIS — Z79.899 IMMUNOSUPPRESSION DUE TO DRUG THERAPY: ICD-10-CM

## 2024-07-25 DIAGNOSIS — E55.9 VITAMIN D DEFICIENCY: ICD-10-CM

## 2024-07-25 DIAGNOSIS — E78.2 MIXED HYPERLIPIDEMIA: ICD-10-CM

## 2024-07-25 DIAGNOSIS — J30.9 ALLERGIC RHINITIS, UNSPECIFIED SEASONALITY, UNSPECIFIED TRIGGER: ICD-10-CM

## 2024-07-25 DIAGNOSIS — E21.3 HYPERPARATHYROIDISM: ICD-10-CM

## 2024-07-25 PROBLEM — N18.32 STAGE 3B CHRONIC KIDNEY DISEASE: Status: RESOLVED | Noted: 2018-02-14 | Resolved: 2024-07-25

## 2024-07-25 PROCEDURE — 99999 PR PBB SHADOW E&M-EST. PATIENT-LVL V: CPT | Mod: PBBFAC,HCNC,, | Performed by: FAMILY MEDICINE

## 2024-07-25 RX ORDER — BUSPIRONE HYDROCHLORIDE 7.5 MG/1
7.5 TABLET ORAL 2 TIMES DAILY
Qty: 60 TABLET | Refills: 11 | Status: SHIPPED | OUTPATIENT
Start: 2024-07-25

## 2024-07-25 RX ORDER — AZELASTINE 1 MG/ML
1 SPRAY, METERED NASAL 2 TIMES DAILY
Qty: 30 ML | Refills: 11 | Status: SHIPPED | OUTPATIENT
Start: 2024-07-25

## 2024-07-25 RX ORDER — LEVOCETIRIZINE DIHYDROCHLORIDE 5 MG/1
5 TABLET, FILM COATED ORAL NIGHTLY
Qty: 30 TABLET | Refills: 11 | Status: SHIPPED | OUTPATIENT
Start: 2024-07-25

## 2024-07-26 NOTE — ASSESSMENT & PLAN NOTE
Controlled, continue pravastatin     Lab Results   Component Value Date    CHOL 116 (L) 06/19/2024    LDLCALC 40.4 (L) 06/19/2024    TRIG 68 06/19/2024    HDL 62 06/19/2024    ALT 48 (H) 06/19/2024    AST 43 (H) 06/19/2024    ALKPHOS 60 06/19/2024

## 2024-07-26 NOTE — ASSESSMENT & PLAN NOTE
Stable    Lab Results   Component Value Date    BUN 38 (H) 06/19/2024    CREATININE 1.9 (H) 06/19/2024    EGFRNORACEVR 25.9 (A) 06/19/2024

## 2024-07-26 NOTE — ASSESSMENT & PLAN NOTE
TSH elevated, but improving, T4 normal; continue levothyroxine and monitor    Lab Results   Component Value Date    TSH 5.875 (H) 06/19/2024    FREET4 0.75 06/19/2024

## 2024-07-26 NOTE — PROGRESS NOTES
Subjective:       Patient ID: Violet Swenson is a 84 y.o. female.    Chief Complaint: Annual Exam    History of Present Illness    Patient presents today for a checkup and sinus trouble. She reports experiencing sinus and allergy symptoms for two days, including phlegm in throat, itchy ears and eyelids, and runny nose with dried blood. She denies recent sick contacts or travel. She requests refills for BuSpar, levocetirizine, and Astelin nasal spray, preferring 30-day supplies. She uses [facility] as her preferred pharmacy. She takes thyroid medication as directed, on an empty stomach first thing in the morning, at least 30 minutes before consuming anything else. Recent labs show good blood pressure and cholesterol levels. Vitamin D levels have decreased slightly; she admits to not taking her vitamin D supplement regularly. Thyroid function tests show overall improvement with TSH closer to normal range and T4 within normal range. She is eligible for the RSV vaccine, recommended for older adults before winter. She is advised to consider getting this vaccine and a COVID booster at the pharmacy when feeling better. She reports not having had COVID-19 yet, attributing this to previous vaccinations. Her last colonoscopy in 2018 showed only hemorrhoids and exam of the prior colon surgery site. She prefers to defer further colonoscopy screening unless absolutely necessary, citing her age (84 years old) and previous cancer experience. She lives alone in a large house that she is trying to sell. She attends [employer]. She reports recent loss of her  in September of last year. Her son has stage four cancer with metastases. She has a history of losing two other sons previously. She has a history of cancer treatment, which she reports significantly impacted her. She also has a history of colon surgery. She expresses unwillingness to undergo cancer treatment again if cancer were to recur, citing her age and  previous experience. Patient is otherwise without concerns today.      Review of Systems   Constitutional:  Positive for fatigue. Negative for chills, fever and unexpected weight change.   HENT:  Positive for congestion, dental problem, ear pain, hearing loss and rhinorrhea. Negative for trouble swallowing.    Eyes:  Negative for pain and visual disturbance.   Respiratory:  Negative for cough and shortness of breath.    Cardiovascular:  Negative for chest pain, palpitations and leg swelling.   Gastrointestinal:  Positive for abdominal pain, nausea and vomiting. Negative for abdominal distention, blood in stool, constipation and diarrhea.   Genitourinary:  Negative for difficulty urinating, dyspareunia, genital sores, menstrual problem, pelvic pain, vaginal bleeding, vaginal discharge and vaginal pain.        Denies breast pain/masses/discharge   Musculoskeletal:  Positive for arthralgias and myalgias.   Skin:  Negative for rash.   Neurological:  Positive for dizziness, weakness, numbness and headaches.   Hematological:  Negative for adenopathy. Bruises/bleeds easily.   Psychiatric/Behavioral:  Positive for dysphoric mood and sleep disturbance. The patient is nervous/anxious.          Objective:      Physical Exam  Vitals reviewed.   Constitutional:       General: She is not in acute distress.     Appearance: She is well-developed.   HENT:      Head: Normocephalic and atraumatic.   Eyes:      General: Lids are normal. No scleral icterus.     Extraocular Movements: Extraocular movements intact.      Conjunctiva/sclera: Conjunctivae normal.      Pupils: Pupils are equal, round, and reactive to light.   Cardiovascular:      Rate and Rhythm: Normal rate and regular rhythm.      Heart sounds: No murmur heard.     No friction rub. No gallop.   Pulmonary:      Effort: Pulmonary effort is normal.      Breath sounds: Normal breath sounds. No decreased breath sounds, wheezing, rhonchi or rales.   Neurological:      Mental  Status: She is alert and oriented to person, place, and time.      Cranial Nerves: No cranial nerve deficit.      Gait: Gait normal.   Psychiatric:         Mood and Affect: Mood and affect normal.         Assessment:       1. Routine general medical examination at a health care facility    2. Essential hypertension    3. Hypothyroidism (acquired)    4. Vitamin D deficiency    5. Mixed hyperlipidemia    6. Allergic rhinitis, unspecified seasonality, unspecified trigger    7. Anxiety    8. Stage 4 chronic kidney disease    9. Calcified granuloma of lung    10. Hyperparathyroidism    11. Immunosuppression due to drug therapy    12. Recurrent major depressive disorder, in full remission        Plan:   1. Routine general medical examination at a health care facility    2. Essential hypertension  Assessment & Plan:  Controlled, continue metoprolol and olmesartan      3. Hypothyroidism (acquired)  Assessment & Plan:  TSH elevated, but improving, T4 normal; continue levothyroxine and monitor    Lab Results   Component Value Date    TSH 5.875 (H) 06/19/2024    FREET4 0.75 06/19/2024         Orders:  -     T4, Free; Future; Expected date: 01/25/2025  -     TSH; Future; Expected date: 01/25/2025    4. Vitamin D deficiency  Overview:  Continue supplement    Orders:  -     Vitamin D; Future; Expected date: 01/25/2025    5. Mixed hyperlipidemia  Assessment & Plan:  Controlled, continue pravastatin     Lab Results   Component Value Date    CHOL 116 (L) 06/19/2024    LDLCALC 40.4 (L) 06/19/2024    TRIG 68 06/19/2024    HDL 62 06/19/2024    ALT 48 (H) 06/19/2024    AST 43 (H) 06/19/2024    ALKPHOS 60 06/19/2024         Orders:  -     Comprehensive Metabolic Panel; Future; Expected date: 01/25/2025  -     Lipid Panel; Future; Expected date: 01/25/2025    6. Allergic rhinitis, unspecified seasonality, unspecified trigger  Assessment & Plan:  Resume allergy medications    Orders:  -     levocetirizine (XYZAL) 5 MG tablet; Take 1 tablet  (5 mg total) by mouth every evening.  Dispense: 30 tablet; Refill: 11  -     azelastine (ASTELIN) 137 mcg (0.1 %) nasal spray; 1 spray (137 mcg total) by Nasal route 2 (two) times daily.  Dispense: 30 mL; Refill: 11    7. Anxiety  Overview:  Stable on buspar    Orders:  -     busPIRone (BUSPAR) 7.5 MG tablet; Take 1 tablet (7.5 mg total) by mouth 2 (two) times daily.  Dispense: 60 tablet; Refill: 11    8. Stage 4 chronic kidney disease  Overview:  Followed by Nephrology, continue current treatment plan     Assessment & Plan:  Stable    Lab Results   Component Value Date    BUN 38 (H) 06/19/2024    CREATININE 1.9 (H) 06/19/2024    EGFRNORACEVR 25.9 (A) 06/19/2024           9. Calcified granuloma of lung  Overview:  PET CT 2/21, right middle lobe, stable      10. Hyperparathyroidism  Overview:  Followed by Nephrology, continue current treatment plan       11. Immunosuppression due to drug therapy  Overview:  On Plaquenil. Followed by Rheumatology, continue current treatment plan       12. Recurrent major depressive disorder, in full remission  Overview:  Stable on Zoloft       Patient presents with symptoms of phlegm in throat, itchy ears and eyes, runny nose with dried blood for 2 days. Advised to rule out covid with home test as we are currently out of rapid tests in clinic. Possibly allergy flare or other viral illness.   If COVID positive, patient would qualify for antiviral treatment within 5 days of symptom onset. Advised to notify me with result.    Recommend rest and staying hydrated while ill.    Patient expressed understanding and agreement with plan.    Visit today included increased complexity associated with the care of the episodic problem hypothyroidism, which was addressed while instituting co-management of the longitudinal care of the patient due to the serious and/or complex managed problem(s) .    I have evaluated and discussed management associated with medical care services that serve as the  continuing focal point for all needed health care services and/or with medical care services that are part of ongoing care related to my patient's single, serious condition or a complex condition(s).    I am providing ongoing care and I am the primary care provider for this patient, and they are being managed, monitored, and/or observed for their chronic conditions over time.     I have addressed their ongoing health maintenance requirements and needs for all health care services and reviewed co-management plans provided by specialty providers when available.    Health Maintenance Due   Topic Date Due    RSV Vaccine (Age 60+ and Pregnant patients) (1 - 1-dose 60+ series) Never done    COVID-19 Vaccine (4 - 2023-24 season) 09/01/2023     Health Maintenance reviewed/updated. Advised she can consider vaccines when well.    Follow up in about 6 months (around 1/25/2025), or if symptoms worsen or fail to improve, for EP/f/u 6 month with DEVIN, labs PTA.    This note was generated with the assistance of ambient listening technology. Verbal consent was obtained by the patient and accompanying visitor(s) for the recording of patient appointment to facilitate this note. I attest to having reviewed and edited the generated note for accuracy, though some syntax or spelling errors may persist. Please contact the author of this note for any clarification.

## 2024-09-09 RX ORDER — CALCITRIOL 0.25 UG/1
CAPSULE ORAL
Qty: 12 CAPSULE | Refills: 3 | Status: SHIPPED | OUTPATIENT
Start: 2024-09-09

## 2024-09-11 DIAGNOSIS — I25.5 ISCHEMIC CARDIOMYOPATHY: Chronic | ICD-10-CM

## 2024-09-12 RX ORDER — FUROSEMIDE 40 MG/1
TABLET ORAL
Qty: 30 TABLET | Refills: 6 | Status: SHIPPED | OUTPATIENT
Start: 2024-09-12

## 2024-10-24 ENCOUNTER — PATIENT MESSAGE (OUTPATIENT)
Dept: CARDIOLOGY | Facility: CLINIC | Age: 84
End: 2024-10-24
Payer: MEDICARE

## 2024-10-28 NOTE — ASSESSMENT & PLAN NOTE
Controlled, continue current medications  
Stable, continue zoloft and xanax  
Status pending labs, continue current synthroid  
0 (no pain/absence of nonverbal indicators of pain)

## 2024-10-29 ENCOUNTER — HOSPITAL ENCOUNTER (OUTPATIENT)
Dept: RADIOLOGY | Facility: HOSPITAL | Age: 84
Discharge: HOME | End: 2024-10-29
Attending: INTERNAL MEDICINE
Payer: MEDICARE

## 2024-10-29 ENCOUNTER — OFFICE VISIT (OUTPATIENT)
Dept: RHEUMATOLOGY | Facility: CLINIC | Age: 84
End: 2024-10-29
Payer: MEDICARE

## 2024-10-29 ENCOUNTER — INFUSION (OUTPATIENT)
Dept: INFUSION THERAPY | Facility: HOSPITAL | Age: 84
End: 2024-10-29
Attending: INTERNAL MEDICINE
Payer: MEDICARE

## 2024-10-29 VITALS
DIASTOLIC BLOOD PRESSURE: 97 MMHG | HEART RATE: 74 BPM | BODY MASS INDEX: 22.65 KG/M2 | SYSTOLIC BLOOD PRESSURE: 162 MMHG | WEIGHT: 132.69 LBS | HEIGHT: 64 IN

## 2024-10-29 DIAGNOSIS — G89.29 CHRONIC RIGHT SHOULDER PAIN: ICD-10-CM

## 2024-10-29 DIAGNOSIS — Z51.81 ENCOUNTER FOR MEDICATION MONITORING: ICD-10-CM

## 2024-10-29 DIAGNOSIS — M25.511 CHRONIC RIGHT SHOULDER PAIN: ICD-10-CM

## 2024-10-29 DIAGNOSIS — M06.4 UNDIFFERENTIATED INFLAMMATORY ARTHRITIS: ICD-10-CM

## 2024-10-29 DIAGNOSIS — M1A.09X0 IDIOPATHIC CHRONIC GOUT, MULTIPLE SITES, WITHOUT TOPHUS (TOPHI): ICD-10-CM

## 2024-10-29 DIAGNOSIS — M81.0 AGE-RELATED OSTEOPOROSIS WITHOUT CURRENT PATHOLOGICAL FRACTURE: Primary | ICD-10-CM

## 2024-10-29 PROCEDURE — 1101F PT FALLS ASSESS-DOCD LE1/YR: CPT | Mod: HCNC,CPTII,S$GLB, | Performed by: INTERNAL MEDICINE

## 2024-10-29 PROCEDURE — 63600175 PHARM REV CODE 636 W HCPCS: Mod: JZ,JG,HCNC | Performed by: INTERNAL MEDICINE

## 2024-10-29 PROCEDURE — 3077F SYST BP >= 140 MM HG: CPT | Mod: HCNC,CPTII,S$GLB, | Performed by: INTERNAL MEDICINE

## 2024-10-29 PROCEDURE — G2211 COMPLEX E/M VISIT ADD ON: HCPCS | Mod: HCNC,S$GLB,, | Performed by: INTERNAL MEDICINE

## 2024-10-29 PROCEDURE — 99214 OFFICE O/P EST MOD 30 MIN: CPT | Mod: HCNC,S$GLB,, | Performed by: INTERNAL MEDICINE

## 2024-10-29 PROCEDURE — 3080F DIAST BP >= 90 MM HG: CPT | Mod: HCNC,CPTII,S$GLB, | Performed by: INTERNAL MEDICINE

## 2024-10-29 PROCEDURE — 1157F ADVNC CARE PLAN IN RCRD: CPT | Mod: HCNC,CPTII,S$GLB, | Performed by: INTERNAL MEDICINE

## 2024-10-29 PROCEDURE — 73030 X-RAY EXAM OF SHOULDER: CPT | Mod: TC,50,HCNC

## 2024-10-29 PROCEDURE — 1125F AMNT PAIN NOTED PAIN PRSNT: CPT | Mod: HCNC,CPTII,S$GLB, | Performed by: INTERNAL MEDICINE

## 2024-10-29 PROCEDURE — 3288F FALL RISK ASSESSMENT DOCD: CPT | Mod: HCNC,CPTII,S$GLB, | Performed by: INTERNAL MEDICINE

## 2024-10-29 PROCEDURE — 99999 PR PBB SHADOW E&M-EST. PATIENT-LVL III: CPT | Mod: PBBFAC,HCNC,, | Performed by: INTERNAL MEDICINE

## 2024-10-29 PROCEDURE — 96372 THER/PROPH/DIAG INJ SC/IM: CPT | Mod: HCNC

## 2024-10-29 RX ADMIN — DENOSUMAB 60 MG: 60 INJECTION SUBCUTANEOUS at 11:10

## 2024-10-31 ENCOUNTER — OFFICE VISIT (OUTPATIENT)
Dept: CARDIOLOGY | Facility: CLINIC | Age: 84
End: 2024-10-31
Payer: MEDICARE

## 2024-10-31 VITALS
OXYGEN SATURATION: 100 % | DIASTOLIC BLOOD PRESSURE: 76 MMHG | HEART RATE: 83 BPM | SYSTOLIC BLOOD PRESSURE: 124 MMHG | WEIGHT: 131.31 LBS | BODY MASS INDEX: 22.53 KG/M2

## 2024-10-31 DIAGNOSIS — I25.5 ISCHEMIC CARDIOMYOPATHY: Chronic | ICD-10-CM

## 2024-10-31 DIAGNOSIS — I50.22 CHRONIC SYSTOLIC CONGESTIVE HEART FAILURE: ICD-10-CM

## 2024-10-31 DIAGNOSIS — I95.1 POSTURAL HYPOTENSION: ICD-10-CM

## 2024-10-31 DIAGNOSIS — F33.42 RECURRENT MAJOR DEPRESSIVE DISORDER, IN FULL REMISSION: ICD-10-CM

## 2024-10-31 DIAGNOSIS — R79.89 ABNORMAL LFTS (LIVER FUNCTION TESTS): ICD-10-CM

## 2024-10-31 DIAGNOSIS — I10 ESSENTIAL HYPERTENSION: Chronic | ICD-10-CM

## 2024-10-31 DIAGNOSIS — I48.0 PAROXYSMAL ATRIAL FIBRILLATION: ICD-10-CM

## 2024-10-31 DIAGNOSIS — I25.118 ATHEROSCLEROSIS OF NATIVE CORONARY ARTERY OF NATIVE HEART WITH STABLE ANGINA PECTORIS: Primary | ICD-10-CM

## 2024-10-31 DIAGNOSIS — N18.32 STAGE 3B CHRONIC KIDNEY DISEASE: ICD-10-CM

## 2024-10-31 DIAGNOSIS — I50.32 CHRONIC DIASTOLIC HEART FAILURE: ICD-10-CM

## 2024-10-31 DIAGNOSIS — C83.30 DIFFUSE LARGE B-CELL LYMPHOMA, UNSPECIFIED BODY REGION: ICD-10-CM

## 2024-10-31 DIAGNOSIS — Z96.653 S/P TKR (TOTAL KNEE REPLACEMENT), BILATERAL: Chronic | ICD-10-CM

## 2024-10-31 DIAGNOSIS — Z98.84 S/P GASTRIC BYPASS: Chronic | ICD-10-CM

## 2024-10-31 DIAGNOSIS — N18.4 STAGE 4 CHRONIC KIDNEY DISEASE: ICD-10-CM

## 2024-10-31 DIAGNOSIS — T45.1X5A CHEMOTHERAPY-INDUCED NEUROPATHY: Chronic | ICD-10-CM

## 2024-10-31 DIAGNOSIS — I25.10 CORONARY ARTERY DISEASE INVOLVING NATIVE CORONARY ARTERY OF NATIVE HEART WITHOUT ANGINA PECTORIS: Chronic | ICD-10-CM

## 2024-10-31 DIAGNOSIS — I70.0 ATHEROSCLEROSIS OF AORTA: ICD-10-CM

## 2024-10-31 DIAGNOSIS — G62.0 CHEMOTHERAPY-INDUCED NEUROPATHY: Chronic | ICD-10-CM

## 2024-10-31 DIAGNOSIS — E78.2 MIXED HYPERLIPIDEMIA: Chronic | ICD-10-CM

## 2024-10-31 DIAGNOSIS — Z95.0 CARDIAC PACEMAKER IN SITU: ICD-10-CM

## 2024-10-31 PROCEDURE — 99999 PR PBB SHADOW E&M-EST. PATIENT-LVL III: CPT | Mod: PBBFAC,HCNC,, | Performed by: INTERNAL MEDICINE

## 2024-11-04 DIAGNOSIS — I25.10 CORONARY ARTERY DISEASE INVOLVING NATIVE CORONARY ARTERY OF NATIVE HEART WITHOUT ANGINA PECTORIS: Chronic | ICD-10-CM

## 2024-11-04 DIAGNOSIS — I10 ESSENTIAL HYPERTENSION: Primary | ICD-10-CM

## 2024-11-04 DIAGNOSIS — I25.5 ISCHEMIC CARDIOMYOPATHY: Chronic | ICD-10-CM

## 2024-11-04 NOTE — PROGRESS NOTES
Subjective:   Patient ID:  Violet wSenson is a 84 y.o. female who presents for evaluation of No chief complaint on file.      HPI    Violet Swenson is a 84 year old female who presents to Arrhythmia clinic for follow up with device check.   Her current medical conditions include CAD s/p PCI, AFL, HTN, HLP, CVA, s/p CRT-P, s/p gastric bypass. She returns today and states she is doing ok.     Device check completed today- remains 100% AFIB, no arrhythmias. Well functioning device.     Has noticed worsening fatigue after moving homes.   Has imbalance issues and requires use of rollator.     BP well controlled today.   She does endorse some labored breathing with exertional activities.     Reports compliance with medications and dietary restrictions.   Follows with Rheumatology for Arthritis.     Denies chest pain or anginal equivalents. No shortness of breath, ESCOBAR or palpitations. Denies orthopnea, PND or abdominal bloating. Reports regular walking without any issues lately. NO leg swelling or claudications. No recent falls, syncope or near syncopal events. Reports compliance with medications and dietary restrictions. NO CNS complaints to suggest TIA or CVA today. No signs of abnormal bleeding on Plavix and Eliquis.     Past Medical History:   Diagnosis Date    Age-related osteoporosis without current pathological fracture 8/20/2018    Anemia     Anxiety     Arthritis     Atrial flutter     Cancer     lymphoma Large cell B    CHF (congestive heart failure)     Chronic anemia 4/26/2017    Chronic midline low back pain with right-sided sciatica 8/20/2018    Coronary artery disease     01/2015 [facility] patent LCX. 50% stenosis in LAD and RCA.      Depression     Disorder of kidney and ureter     Encounter for blood transfusion     GERD (gastroesophageal reflux disease)     Gout, arthritis     Heart failure     Hx of psychiatric care     Hyperlipidemia     Hypertension     Hypothyroidism     Immune  deficiency disorder     Kidney disease     Lung nodule 2014    RML--stable    Obesity     Pacemaker     Metronic    Paroxysmal atrial fibrillation 3/15/2018    Pneumonia     Polyneuropathy     chemo induced    Psychiatric problem     Rheumatoid arthritis involving multiple sites with positive rheumatoid factor 4/19/2023    Stroke 11/16/2020    Tobacco dependence     quit 1976    Trouble in sleeping     Unstable angina 11/27/2020       Past Surgical History:   Procedure Laterality Date    APPENDECTOMY  1966 approx    CARDIAC PACEMAKER PLACEMENT  01/22/2015    CHOLECYSTECTOMY  1993    incidental at time of gastric bypass    COLON SURGERY Right 2017    hemicolectomy    COLONOSCOPY N/A 4/6/2017    Procedure: COLONOSCOPY;  Surgeon: Tye Enamorado MD;  Location: [facility] ENDO;  Service: Endoscopy;  Laterality: N/A;    COLONOSCOPY N/A 11/28/2018    Procedure: COLONOSCOPY;  Surgeon: Saúl Arthur III, MD;  Location: [facility];  Service: Endoscopy;  Laterality: N/A;    CORONARY ANGIOPLASTY  02/2014    CORONARY STENT PLACEMENT  02/05/2014    ESOPHAGOGASTRODUODENOSCOPY N/A 11/28/2018    Procedure: EGD (ESOPHAGOGASTRODUODENOSCOPY);  Surgeon: Saúl Arthur III, MD;  Location: [facility] ENDO;  Service: Endoscopy;  Laterality: N/A;    GASTRIC BYPASS  1993    with incidental choly    HERNIA REPAIR      IMPLANTATION OF BIVENTRICULAR PERMANENT PACEMAKER AS UPGRADE TO EXISTING PACEMAKER Left 7/13/2023    Procedure: Biventricular pacemaker upgrade/His lead vs CRT-P;  Surgeon: Velasquez Vizcaino MD;  Location: [facility] CATH LAB;  Service: Cardiology;  Laterality: Left;  Will need to upgrade her pacemaker.  Ideally his pacing lead would be the best approach. MDT/ Alternatively, an upgrade /His lead as Plan A  and  CRT if fails/notified MDT rep Arabella  NOT MRI safe   Pacer and leads implanted    INJECTION OF ANESTHETIC AGENT INTO SACROILIAC JOINT Right 10/8/2020    Procedure: Right BLOCK, SACROILIAC JOINT with RN IV sedation;   "Surgeon: Madi Anton MD;  Location: [facility];  Service: Pain Management;  Laterality: Right;    JOINT REPLACEMENT Bilateral     3 months apart    TONSILLECTOMY      VENOGRAM, CATH LAB Bilateral 2023    Procedure: Venogram, Cath Lab;  Surgeon: Velasquez Vizcaino MD;  Location: [facility] CATH LAB;  Service: Cardiology;  Laterality: Bilateral;  1:00PM arrival time  NOT MRI safe   Pacer and leads implanted by [facility], 1/22/15   MDTR SEDR01 Sensia, [id number]   A lead: SAMEER 5076 CapSure Fix Novus, [id number]   RV lead: SAMEER 5076 CapSure Fix Novus, [id number]       Social History     Tobacco Use    Smoking status: Former     Current packs/day: 0.00     Average packs/day: 2.0 packs/day for 6.0 years (12.0 ttl pk-yrs)     Types: Cigarettes     Start date: 3/15/1970     Quit date: 3/15/1976     Years since quittin.6    Smokeless tobacco: Never   Substance Use Topics    Alcohol use: No     Alcohol/week: 0.0 standard drinks of alcohol    Drug use: No       Family History   Problem Relation Name Age of Onset    Heart disease Mother      Hypertension Mother      Cataracts Mother      Stomach cancer Father          "ulcers that turned to cancer"    Cancer Father          stomach    Pancreatic cancer Sister      Cancer Sister          pancreatic    Leukemia Brother          "leukemia which led to intestinal cancer"    Cataracts Brother      Cancer Brother          leukemia then later stomach cancer    Drug abuse Son      Cancer Son          prostate cancer    Prostate cancer Son      COPD Daughter      Asthma Daughter      Hypertension Maternal Grandfather      Stroke Maternal Grandfather      Alcohol abuse Neg Hx      Diabetes Neg Hx      Intellectual disability Neg Hx      Mental illness Neg Hx         Wt Readings from Last 3 Encounters:   24 58.8 kg (129 lb 10.1 oz)   10/31/24 59.5 kg (131 lb 4.5 oz)   10/29/24 60.2 kg (132 lb 11.5 oz)     Temp Readings from Last 3 Encounters:   23 96 °F (35.6 " °C) (Tympanic)   07/13/23 97.9 °F (36.6 °C) (Temporal)   06/29/23 97.5 °F (36.4 °C) (Temporal)     BP Readings from Last 3 Encounters:   11/05/24 132/80   10/31/24 124/76   10/29/24 (!) 162/97     Pulse Readings from Last 3 Encounters:   11/05/24 64   10/31/24 83   10/29/24 74       Current Outpatient Medications on File Prior to Visit   Medication Sig Dispense Refill    acetaminophen (TYLENOL ARTHRITIS PAIN) 650 MG TbSR Take 650 mg by mouth every 8 (eight) hours.      allopurinoL (ZYLOPRIM) 300 MG tablet Take 1 tablet (300 mg total) by mouth once daily. 90 tablet 11    ascorbic acid, vitamin C, (VITAMIN C) 100 MG tablet Take by mouth once daily.      azelastine (ASTELIN) 137 mcg (0.1 %) nasal spray 1 spray (137 mcg total) by Nasal route 2 (two) times daily. (Patient taking differently: 1 spray by Nasal route 2 (two) times daily. PRN) 30 mL 11    busPIRone (BUSPAR) 7.5 MG tablet Take 1 tablet (7.5 mg total) by mouth 2 (two) times daily. 60 tablet 11    calcitRIOL (ROCALTROL) 0.25 MCG Cap TAKE ONE CAPSULE BY MOUTH EVERY MONDAY, WEDNESDAY AND FRIDAY 12 capsule 3    clopidogreL (PLAVIX) 75 mg tablet TAKE ONE TABLET BY MOUTH EVERY DAY 30 tablet 6    diclofenac sodium 1 % Gel Apply 2 g topically once daily. Apply 2 g over painful joints once or twice a day. 100 g 6    DIPRIVAN 10 mg/mL infusion       DULoxetine (CYMBALTA) 30 MG capsule TAKE ONE CAPSULE BY MOUTH EVERY DAY 30 capsule 11    ELIQUIS 2.5 mg Tab TAKE ONE TABLET BY MOUTH TWICE DAILY 180 tablet 3    fluticasone propionate (FLONASE) 50 mcg/actuation nasal spray 2 sprays (100 mcg total) by Each Nostril route once daily. 16 g 11    furosemide (LASIX) 40 MG tablet TAKE ONE TABLET BY MOUTH EVERY DAY THEN TAKE TWO TABLETS EVERY OTHER DAY 30 tablet 6    glucosamine-D3-Boswellia serr (OSTEO BI-FLEX, 5-LOXIN,) 1,500-400-100 mg-unit-mg Tab Take by mouth.      hydroxychloroquine (PLAQUENIL) 200 mg tablet Take 1 tablet (200 mg total) by mouth every other day. 45 tablet 1     iron-vitamin C 100-250 mg, ICAR-C, 100-250 mg Tab TAKE ONE TABLET BY MOUTH EVERY DAY 30 tablet 4    levocetirizine (XYZAL) 5 MG tablet Take 1 tablet (5 mg total) by mouth every evening. 30 tablet 11    levothyroxine (SYNTHROID) 100 MCG tablet Take 1 tablet (100 mcg total) by mouth before breakfast. 90 tablet 3    metoprolol tartrate (LOPRESSOR) 25 MG tablet TAKE ONE TABLET BY MOUTH TWICE DAILY 60 tablet 6    multivitamin (THERAGRAN) per tablet Take 1 tablet by mouth once daily.      olmesartan (BENICAR) 20 MG tablet Take 1 tablet (20 mg total) by mouth once daily. 90 tablet 3    pravastatin (PRAVACHOL) 40 MG tablet Take 1 tablet (40 mg total) by mouth once daily. 90 tablet 3    pregabalin (LYRICA) 50 MG capsule Take 50 mg by mouth 3 (three) times daily.      sertraline (ZOLOFT) 100 MG tablet Take 1.5 tablets (150 mg total) by mouth once daily. 135 tablet 3    sodium bicarbonate 650 MG tablet TAKE ONE TABLET BY MOUTH TWICE DAILY 60 tablet 11    tumeric-ging-olive-oreg-capryl 100 mg-150 mg- 50 mg-150 mg Cap Take by mouth.      VITAMIN D2 1,250 mcg (50,000 unit) capsule TAKE ONE CAPSULE BY MOUTH EVERY 7 DAYS 12 capsule 11    ZINC ACETATE ORAL Take 250 mg by mouth once daily.        Current Facility-Administered Medications on File Prior to Visit   Medication Dose Route Frequency Provider Last Rate Last Admin    denosumab (PROLIA) injection 60 mg  60 mg Subcutaneous Q6 Months Dilshad Rogers MD   60 mg at 08/29/18 1032       No cardiac monitor results found for the past 12 months    Results for orders placed during the hospital encounter of 10/30/23    Echo    Interpretation Summary    Left Ventricle: The left ventricle is normal in size. Normal wall thickness. Normal wall motion. There is mildly reduced systolic function. Ejection fraction by visual approximation is 45%. Biplane (2D) method of discs ejection fraction is 48%. There is diastolic dysfunction but grade cannot be determined.    Right Ventricle:  Normal right ventricular cavity size. Wall thickness is normal. Right ventricle wall motion  is normal. Systolic function is normal.    Left Atrium: Left atrium is moderately dilated.    Aortic Valve: There is mild aortic valve sclerosis. Mildly restricted motion. There is mild stenosis. Aortic valve area by VTI is 1.54 cm². Aortic valve peak velocity is 1.62 m/s. Mean gradient is 6 mmHg. The dimensionless index is 0.44.    Mitral Valve: There is mild bileaflet sclerosis. There is mild posterior mitral annular calcification present. There is moderate regurgitation.    Tricuspid Valve: There is mild to moderate regurgitation.    Pulmonary Artery: There is mild pulmonary hypertension. The estimated pulmonary artery systolic pressure is 45 mmHg.    IVC/SVC: Normal venous pressure at 3 mmHg.    Results for orders placed during the hospital encounter of 05/19/23    Nuclear Stress - Cardiology Interpreted    Interpretation Summary    Normal myocardial perfusion scan. There is no evidence of myocardial ischemia or infarction.    There is a trivial fixed anteroseptal perfusion abnormality consistent with the patient's underlying A sensing and V pacing    The gated perfusion images showed an ejection fraction of 62% at rest. The gated perfusion images showed an ejection fraction of 49% post stress.    Pt experienced chest heaviness post infusion- resolved by the end of recovery.        Results for orders placed or performed during the hospital encounter of 11/05/24   EKG 12-lead    Collection Time: 11/05/24 12:10 PM   Result Value Ref Range    QRS Duration 144 ms    [facility] QTC Calculation 453 ms    Narrative    Test Reason : I10,I25.10,I25.5,    Vent. Rate : 072 BPM     Atrial Rate : 468 BPM     P-R Int : 000 ms          QRS Dur : 144 ms      QT Int : 414 ms       P-R-T Axes : 000 -07 210 degrees     QTc Int : 453 ms    Ventricular-paced rhythm  Abnormal ECG  When compared with ECG of 04-APR-2024 11:59,  Vent. rate has  "increased BY   5 BPM  Confirmed by Marquis Villa MD ([id number]) on 11/5/2024 12:47:35 PM    Referred By: SONG CHA           Confirmed By:Marquis Villa MD         Review of Systems   Constitutional: Positive for malaise/fatigue.   HENT:  Negative for hearing loss and hoarse voice.    Eyes:  Negative for blurred vision and visual disturbance.   Cardiovascular:  Negative for chest pain, claudication, dyspnea on exertion, irregular heartbeat, leg swelling, near-syncope, orthopnea, palpitations, paroxysmal nocturnal dyspnea and syncope.   Respiratory:  Negative for cough, hemoptysis, shortness of breath, sleep disturbances due to breathing, snoring and wheezing.    Endocrine: Negative for cold intolerance and heat intolerance.   Hematologic/Lymphatic: Bruises/bleeds easily.   Skin:  Negative for color change, dry skin and nail changes.   Musculoskeletal:  Positive for arthritis, back pain and joint pain. Negative for myalgias.   Gastrointestinal:  Negative for bloating, abdominal pain, constipation, nausea and vomiting.   Genitourinary:  Negative for dysuria, flank pain, hematuria and hesitancy.   Neurological:  Negative for headaches, light-headedness, loss of balance, numbness, paresthesias and weakness.   Psychiatric/Behavioral:  Negative for altered mental status.    Allergic/Immunologic: Negative for environmental allergies.         Objective:/80 (BP Location: Left arm, Patient Position: Sitting)   Pulse 64   Ht 5' 4" (1.626 m)   Wt 58.8 kg (129 lb 10.1 oz)   SpO2 98%   BMI 22.25 kg/m²      Physical Exam  Vitals and nursing note reviewed.   Constitutional:       General: She is not in acute distress.     Appearance: Normal appearance. She is well-developed. She is not ill-appearing.   HENT:      Head: Normocephalic and atraumatic.      Nose: Nose normal.      Mouth/Throat:      Mouth: Mucous membranes are moist.   Eyes:      Pupils: Pupils are equal, round, and reactive to light.   Neck:      " Thyroid: No thyromegaly.      Vascular: No JVD.      Trachea: No tracheal deviation.   Cardiovascular:      Rate and Rhythm: Normal rate and regular rhythm.      Chest Wall: PMI is not displaced.      Pulses: Intact distal pulses.           Radial pulses are 2+ on the right side and 2+ on the left side.        Dorsalis pedis pulses are 2+ on the right side and 2+ on the left side.      Heart sounds: S1 normal and S2 normal. Heart sounds not distant. No murmur heard.  Pulmonary:      Effort: Pulmonary effort is normal. No respiratory distress.      Breath sounds: Normal breath sounds. No wheezing.   Abdominal:      General: Bowel sounds are normal. There is no distension.      Palpations: Abdomen is soft.      Tenderness: There is no abdominal tenderness.   Musculoskeletal:         General: No swelling. Normal range of motion.      Cervical back: Full passive range of motion without pain, normal range of motion and neck supple.      Right lower leg: No edema.      Left lower leg: No edema.      Right ankle: No swelling.      Left ankle: No swelling.   Skin:     General: Skin is warm and dry.      Capillary Refill: Capillary refill takes less than 2 seconds.      Nails: There is no clubbing.   Neurological:      General: No focal deficit present.      Mental Status: She is alert and oriented to person, place, and time.      Motor: Weakness present.   Psychiatric:         Speech: Speech normal.         Behavior: Behavior normal.         Thought Content: Thought content normal.         Judgment: Judgment normal.         Lab Results   Component Value Date    CHOL 116 (L) 06/19/2024    CHOL 164 12/28/2023    CHOL 139 06/16/2023     Lab Results   Component Value Date    HDL 62 06/19/2024    HDL 62 12/28/2023    HDL 63 06/16/2023     Lab Results   Component Value Date    LDLCALC 40.4 (L) 06/19/2024    LDLCALC 74.2 12/28/2023    LDLCALC 56.6 (L) 06/16/2023     Lab Results   Component Value Date    TRIG 68 06/19/2024    TRIG  139 12/28/2023    TRIG 97 06/16/2023     Lab Results   Component Value Date    CHOLHDL 53.4 (H) 06/19/2024    CHOLHDL 37.8 12/28/2023    CHOLHDL 45.3 06/16/2023       Chemistry        Component Value Date/Time     10/29/2024 1020    K 3.9 10/29/2024 1020     10/29/2024 1020    CO2 22 (L) 10/29/2024 1020    BUN 31 (H) 10/29/2024 1020    CREATININE 1.7 (H) 10/29/2024 1020    GLU 95 10/29/2024 1020        Component Value Date/Time    CALCIUM 9.6 10/29/2024 1020    ALKPHOS 52 10/29/2024 1020    AST 22 10/29/2024 1020    ALT 22 10/29/2024 1020    BILITOT 0.4 10/29/2024 1020    ESTGFRAFRICA 35 (A) 05/31/2022 0909    EGFRNONAA 30 (A) 05/31/2022 0909          Lab Results   Component Value Date    TSH 5.875 (H) 06/19/2024     Lab Results   Component Value Date    INR 1.0 06/23/2023    INR 1.0 04/03/2023    INR 0.9 11/07/2021     Lab Results   Component Value Date    WBC 6.77 06/19/2024    HGB 10.9 (L) 06/19/2024    HCT 34.1 (L) 06/19/2024     (H) 06/19/2024     06/19/2024       Assessment:      1. Ischemic cardiomyopathy    2. Cardiac pacemaker in situ    3. Stage 4 chronic kidney disease    4. Chronic anemia    5. NSAID long-term use        Plan:     Continue eliquis for cardio-embolic protection  Continue same CV medications  Dash diet 2 gm sodium restriction  RTC in 6m with device check    Nicole May, FNP-C [facility]

## 2024-11-05 ENCOUNTER — OFFICE VISIT (OUTPATIENT)
Dept: CARDIOLOGY | Facility: CLINIC | Age: 84
End: 2024-11-05
Attending: INTERNAL MEDICINE
Payer: MEDICARE

## 2024-11-05 ENCOUNTER — HOSPITAL ENCOUNTER (OUTPATIENT)
Dept: CARDIOLOGY | Facility: HOSPITAL | Age: 84
Discharge: HOME | End: 2024-11-05
Attending: INTERNAL MEDICINE
Payer: MEDICARE

## 2024-11-05 ENCOUNTER — HOSPITAL ENCOUNTER (OUTPATIENT)
Dept: CARDIOLOGY | Facility: HOSPITAL | Age: 84
Discharge: HOME | End: 2024-11-05
Payer: MEDICARE

## 2024-11-05 VITALS
OXYGEN SATURATION: 98 % | HEIGHT: 64 IN | BODY MASS INDEX: 22.13 KG/M2 | SYSTOLIC BLOOD PRESSURE: 132 MMHG | DIASTOLIC BLOOD PRESSURE: 80 MMHG | HEART RATE: 64 BPM | WEIGHT: 129.63 LBS

## 2024-11-05 DIAGNOSIS — I50.32 CHRONIC DIASTOLIC HEART FAILURE: ICD-10-CM

## 2024-11-05 DIAGNOSIS — Z79.1 NSAID LONG-TERM USE: ICD-10-CM

## 2024-11-05 DIAGNOSIS — I25.5 ISCHEMIC CARDIOMYOPATHY: Primary | ICD-10-CM

## 2024-11-05 DIAGNOSIS — D64.9 CHRONIC ANEMIA: Chronic | ICD-10-CM

## 2024-11-05 DIAGNOSIS — I50.22 CHRONIC SYSTOLIC CONGESTIVE HEART FAILURE: ICD-10-CM

## 2024-11-05 DIAGNOSIS — I25.5 ISCHEMIC CARDIOMYOPATHY: Primary | Chronic | ICD-10-CM

## 2024-11-05 DIAGNOSIS — I25.5 ISCHEMIC CARDIOMYOPATHY: Chronic | ICD-10-CM

## 2024-11-05 DIAGNOSIS — N18.4 STAGE 4 CHRONIC KIDNEY DISEASE: ICD-10-CM

## 2024-11-05 DIAGNOSIS — Z95.0 CARDIAC PACEMAKER IN SITU: ICD-10-CM

## 2024-11-05 DIAGNOSIS — I10 ESSENTIAL HYPERTENSION: ICD-10-CM

## 2024-11-05 DIAGNOSIS — Z95.0 BIVENTRICULAR CARDIAC PACEMAKER IN SITU: ICD-10-CM

## 2024-11-05 DIAGNOSIS — I25.10 CORONARY ARTERY DISEASE INVOLVING NATIVE CORONARY ARTERY OF NATIVE HEART WITHOUT ANGINA PECTORIS: Chronic | ICD-10-CM

## 2024-11-05 DIAGNOSIS — I25.10 CORONARY ARTERY DISEASE INVOLVING NATIVE CORONARY ARTERY OF NATIVE HEART WITHOUT ANGINA PECTORIS: ICD-10-CM

## 2024-11-05 DIAGNOSIS — I25.5 ISCHEMIC CARDIOMYOPATHY: ICD-10-CM

## 2024-11-05 LAB
OHS QRS DURATION: 144 MS
OHS QTC CALCULATION: 453 MS

## 2024-11-05 PROCEDURE — 99214 OFFICE O/P EST MOD 30 MIN: CPT | Mod: HCNC,S$GLB,, | Performed by: NURSE PRACTITIONER

## 2024-11-05 PROCEDURE — 1157F ADVNC CARE PLAN IN RCRD: CPT | Mod: HCNC,CPTII,S$GLB, | Performed by: NURSE PRACTITIONER

## 2024-11-05 PROCEDURE — 99999 PR PBB SHADOW E&M-EST. PATIENT-LVL II: CPT | Mod: PBBFAC,HCNC,, | Performed by: NURSE PRACTITIONER

## 2024-11-05 PROCEDURE — 1159F MED LIST DOCD IN RCRD: CPT | Mod: HCNC,CPTII,S$GLB, | Performed by: NURSE PRACTITIONER

## 2024-11-05 PROCEDURE — 3288F FALL RISK ASSESSMENT DOCD: CPT | Mod: HCNC,CPTII,S$GLB, | Performed by: NURSE PRACTITIONER

## 2024-11-05 PROCEDURE — 93010 ELECTROCARDIOGRAM REPORT: CPT | Mod: HCNC,,, | Performed by: STUDENT IN AN ORGANIZED HEALTH CARE EDUCATION/TRAINING PROGRAM

## 2024-11-05 PROCEDURE — 93281 PM DEVICE PROGR EVAL MULTI: CPT | Mod: HCNC

## 2024-11-05 PROCEDURE — 3079F DIAST BP 80-89 MM HG: CPT | Mod: HCNC,CPTII,S$GLB, | Performed by: NURSE PRACTITIONER

## 2024-11-05 PROCEDURE — 3075F SYST BP GE 130 - 139MM HG: CPT | Mod: HCNC,CPTII,S$GLB, | Performed by: NURSE PRACTITIONER

## 2024-11-05 PROCEDURE — 1101F PT FALLS ASSESS-DOCD LE1/YR: CPT | Mod: HCNC,CPTII,S$GLB, | Performed by: NURSE PRACTITIONER

## 2024-11-05 PROCEDURE — 1126F AMNT PAIN NOTED NONE PRSNT: CPT | Mod: HCNC,CPTII,S$GLB, | Performed by: NURSE PRACTITIONER

## 2024-11-05 PROCEDURE — 93005 ELECTROCARDIOGRAM TRACING: CPT | Mod: HCNC

## 2024-11-09 DIAGNOSIS — M06.4 UNDIFFERENTIATED INFLAMMATORY ARTHRITIS: ICD-10-CM

## 2024-11-09 DIAGNOSIS — D50.8 OTHER IRON DEFICIENCY ANEMIA: ICD-10-CM

## 2024-11-11 ENCOUNTER — OFFICE VISIT (OUTPATIENT)
Dept: SPORTS MEDICINE | Facility: CLINIC | Age: 84
End: 2024-11-11
Payer: MEDICARE

## 2024-11-11 VITALS — WEIGHT: 129.63 LBS | HEIGHT: 64 IN | BODY MASS INDEX: 22.13 KG/M2

## 2024-11-11 DIAGNOSIS — G89.29 CHRONIC RIGHT SHOULDER PAIN: ICD-10-CM

## 2024-11-11 DIAGNOSIS — M25.511 CHRONIC RIGHT SHOULDER PAIN: ICD-10-CM

## 2024-11-11 DIAGNOSIS — M75.101 RIGHT ROTATOR CUFF TEAR ARTHROPATHY: Primary | ICD-10-CM

## 2024-11-11 DIAGNOSIS — M12.811 RIGHT ROTATOR CUFF TEAR ARTHROPATHY: Primary | ICD-10-CM

## 2024-11-11 PROCEDURE — 3288F FALL RISK ASSESSMENT DOCD: CPT | Mod: HCNC,CPTII,S$GLB, | Performed by: PHYSICIAN ASSISTANT

## 2024-11-11 PROCEDURE — 20610 DRAIN/INJ JOINT/BURSA W/O US: CPT | Mod: HCNC,RT,S$GLB, | Performed by: PHYSICIAN ASSISTANT

## 2024-11-11 PROCEDURE — 1157F ADVNC CARE PLAN IN RCRD: CPT | Mod: HCNC,CPTII,S$GLB, | Performed by: PHYSICIAN ASSISTANT

## 2024-11-11 PROCEDURE — 99999 PR PBB SHADOW E&M-EST. PATIENT-LVL V: CPT | Mod: PBBFAC,HCNC,, | Performed by: PHYSICIAN ASSISTANT

## 2024-11-11 PROCEDURE — 1159F MED LIST DOCD IN RCRD: CPT | Mod: HCNC,CPTII,S$GLB, | Performed by: PHYSICIAN ASSISTANT

## 2024-11-11 PROCEDURE — 99213 OFFICE O/P EST LOW 20 MIN: CPT | Mod: HCNC,25,S$GLB, | Performed by: PHYSICIAN ASSISTANT

## 2024-11-11 PROCEDURE — 1101F PT FALLS ASSESS-DOCD LE1/YR: CPT | Mod: HCNC,CPTII,S$GLB, | Performed by: PHYSICIAN ASSISTANT

## 2024-11-11 PROCEDURE — 1160F RVW MEDS BY RX/DR IN RCRD: CPT | Mod: HCNC,CPTII,S$GLB, | Performed by: PHYSICIAN ASSISTANT

## 2024-11-11 PROCEDURE — 1125F AMNT PAIN NOTED PAIN PRSNT: CPT | Mod: HCNC,CPTII,S$GLB, | Performed by: PHYSICIAN ASSISTANT

## 2024-11-11 RX ORDER — HYDROXYCHLOROQUINE SULFATE 200 MG/1
200 TABLET, FILM COATED ORAL EVERY OTHER DAY
Qty: 45 TABLET | Refills: 1 | Status: SHIPPED | OUTPATIENT
Start: 2024-11-11

## 2024-11-11 RX ORDER — TRIAMCINOLONE ACETONIDE 40 MG/ML
40 INJECTION, SUSPENSION INTRA-ARTICULAR; INTRAMUSCULAR
Status: DISCONTINUED | OUTPATIENT
Start: 2024-11-11 | End: 2024-11-11 | Stop reason: HOSPADM

## 2024-11-11 RX ORDER — IRON,CARBONYL/ASCORBIC ACID 100-250 MG
TABLET ORAL
Qty: 30 TABLET | Refills: 4 | Status: SHIPPED | OUTPATIENT
Start: 2024-11-11

## 2024-11-11 RX ADMIN — TRIAMCINOLONE ACETONIDE 40 MG: 40 INJECTION, SUSPENSION INTRA-ARTICULAR; INTRAMUSCULAR at 11:11

## 2024-11-11 NOTE — PROGRESS NOTES
Orthopaedics Sports Medicine     Shoulder Initial Visit         2024    Referring MD: Dilshad Rogers,*    Chief Complaint   Patient presents with    Right Shoulder - Pain         History of Present Illness:   CHIEF COMPLAINT:  - Violet presents today for initial evaluation of bilateral shoulder pain, primarily affecting the right shoulder.    HPI:  Violet presents with bilateral shoulder pain, primarily affecting the right shoulder. She reports severe pain in her right shoulder, stating she was unable to sleep the previous night due to severe arm pain. She has been taking Tylenol arthritis as prescribed by Dr. PANIAGUA, but reports it provides minimal relief. She mentions a previous cortisone injection given by Dr. PANIAGUA in a specific spot (AC joint) on her shoulder, which provided no relief. She fell this morning on her way to the appointment, injuring her right elbow. She describes instances where reaching or extending her arm causes severe pain, stating that sometimes extending her arm causes extreme discomfort. Violet uses a rollator when going out and a cane inside her house due to balance issues, reporting difficulty maintaining balance and walking in a straight line for extended periods. She reports having arthritis in her hands as well, particularly in her thumb. Violet mentions recent life changes, including the death of her  about a year ago, selling her house, and now living in a travel trailer. She also shares that her remaining son has stage 4 cancer and her daughter has severe COPD and asthma but continues to smoke.    WORK STATUS:  - Retired  - Uses rollator when going out and cane inside house  - Mobility limitations significantly impact daily activities    SOCIAL HISTORY:  - Smoking: Daughter smokes cigarettes despite having COPD and asthma  - Marital Status: ,   about a year ago  - Living Situation: Lives alone in a travel trailer      Past Medical History:   Past  Medical History:   Diagnosis Date    Age-related osteoporosis without current pathological fracture 8/20/2018    Anemia     Anxiety     Arthritis     Atrial flutter     Cancer     lymphoma Large cell B    CHF (congestive heart failure)     Chronic anemia 4/26/2017    Chronic midline low back pain with right-sided sciatica 8/20/2018    Coronary artery disease     01/2015 C patent LCX. 50% stenosis in LAD and RCA.      Depression     Disorder of kidney and ureter     Encounter for blood transfusion     GERD (gastroesophageal reflux disease)     Gout, arthritis     Heart failure     Hx of psychiatric care     Hyperlipidemia     Hypertension     Hypothyroidism     Immune deficiency disorder     Kidney disease     Lung nodule 2014    RML--stable    Obesity     Pacemaker     Metronic    Paroxysmal atrial fibrillation 3/15/2018    Pneumonia     Polyneuropathy     chemo induced    Psychiatric problem     Rheumatoid arthritis involving multiple sites with positive rheumatoid factor 4/19/2023    Stroke 11/16/2020    Tobacco dependence     quit 1976    Trouble in sleeping     Unstable angina 11/27/2020       Past Surgical History:   Past Surgical History:   Procedure Laterality Date    APPENDECTOMY  1966 approx    CARDIAC PACEMAKER PLACEMENT  01/22/2015    CHOLECYSTECTOMY  1993    incidental at time of gastric bypass    COLON SURGERY Right 2017    hemicolectomy    COLONOSCOPY N/A 4/6/2017    Procedure: COLONOSCOPY;  Surgeon: Tye Enamorado MD;  Location: [facility];  Service: Endoscopy;  Laterality: N/A;    COLONOSCOPY N/A 11/28/2018    Procedure: COLONOSCOPY;  Surgeon: Saúl Arthur III, MD;  Location: [facility];  Service: Endoscopy;  Laterality: N/A;    CORONARY ANGIOPLASTY  02/2014    CORONARY STENT PLACEMENT  02/05/2014    ESOPHAGOGASTRODUODENOSCOPY N/A 11/28/2018    Procedure: EGD (ESOPHAGOGASTRODUODENOSCOPY);  Surgeon: Saúl Arthur III, MD;  Location: [facility];  Service: Endoscopy;  Laterality: N/A;    GASTRIC  BYPASS  1993    with incidental choly    HERNIA REPAIR      IMPLANTATION OF BIVENTRICULAR PERMANENT PACEMAKER AS UPGRADE TO EXISTING PACEMAKER Left 7/13/2023    Procedure: Biventricular pacemaker upgrade/His lead vs CRT-P;  Surgeon: Velasquez Vizcaino MD;  Location: [facility] CATH LAB;  Service: Cardiology;  Laterality: Left;  Will need to upgrade her pacemaker.  Ideally his pacing lead would be the best approach. MDT/ Alternatively, an upgrade /His lead as Plan A  and  CRT if fails/notified MDT rep Mell and Arik  NOT MRI safe   Pacer and leads implanted    INJECTION OF ANESTHETIC AGENT INTO SACROILIAC JOINT Right 10/8/2020    Procedure: Right BLOCK, SACROILIAC JOINT with RN IV sedation;  Surgeon: Madi Anton MD;  Location: [facility];  Service: Pain Management;  Laterality: Right;    JOINT REPLACEMENT Bilateral 2009    3 months apart    TONSILLECTOMY  1959    VENOGRAM, CATH LAB Bilateral 6/29/2023    Procedure: Venogram, Cath Lab;  Surgeon: Velasquez Vizcaino MD;  Location: [facility] CATH LAB;  Service: Cardiology;  Laterality: Bilateral;  1:00PM arrival time  NOT MRI safe   Pacer and leads implanted by Kian 1/22/15   MDTR SEDR01 Sensia, [id number]   A lead: SAEMER [address]76 [employer] Fix Efren, [id number]   RV lead: SAMEER [address] Cap[employer] Fix Efren, [id number]       Medications:  Patient's Medications   New Prescriptions    No medications on file   Previous Medications    ACETAMINOPHEN (TYLENOL ARTHRITIS PAIN) 650 MG TBSR    Take 650 mg by mouth every 8 (eight) hours.    ALLOPURINOL (ZYLOPRIM) 300 MG TABLET    Take 1 tablet (300 mg total) by mouth once daily.    ASCORBIC ACID, VITAMIN C, (VITAMIN C) 100 MG TABLET    Take by mouth once daily.    AZELASTINE (ASTELIN) 137 MCG (0.1 %) NASAL SPRAY    1 spray (137 mcg total) by Nasal route 2 (two) times daily.    BUSPIRONE (BUSPAR) 7.5 MG TABLET    Take 1 tablet (7.5 mg total) by mouth 2 (two) times daily.    CALCITRIOL (ROCALTROL) 0.25 MCG CAP    TAKE ONE CAPSULE BY MOUTH  EVERY MONDAY, WEDNESDAY AND FRIDAY    CLOPIDOGREL (PLAVIX) 75 MG TABLET    TAKE ONE TABLET BY MOUTH EVERY DAY    DICLOFENAC SODIUM 1 % GEL    Apply 2 g topically once daily. Apply 2 g over painful joints once or twice a day.    DIPRIVAN 10 MG/ML INFUSION        DULOXETINE (CYMBALTA) 30 MG CAPSULE    TAKE ONE CAPSULE BY MOUTH EVERY DAY    ELIQUIS 2.5 MG TAB    TAKE ONE TABLET BY MOUTH TWICE DAILY    FLUTICASONE PROPIONATE (FLONASE) 50 MCG/ACTUATION NASAL SPRAY    2 sprays (100 mcg total) by Each Nostril route once daily.    FUROSEMIDE (LASIX) 40 MG TABLET    TAKE ONE TABLET BY MOUTH EVERY DAY THEN TAKE TWO TABLETS EVERY OTHER DAY    GLUCOSAMINE-D3-BOSWELLIA SERR (OSTEO BI-FLEX, 5-LOXIN,) 1,500-400-100 MG-UNIT-MG TAB    Take by mouth.    HYDROXYCHLOROQUINE (PLAQUENIL) 200 MG TABLET    Take 1 tablet (200 mg total) by mouth every other day.    IRON-VITAMIN C 100-250 MG, ICAR-C, 100-250 MG TAB    TAKE ONE TABLET BY MOUTH EVERY DAY    LEVOCETIRIZINE (XYZAL) 5 MG TABLET    Take 1 tablet (5 mg total) by mouth every evening.    LEVOTHYROXINE (SYNTHROID) 100 MCG TABLET    Take 1 tablet (100 mcg total) by mouth before breakfast.    METOPROLOL TARTRATE (LOPRESSOR) 25 MG TABLET    TAKE ONE TABLET BY MOUTH TWICE DAILY    MULTIVITAMIN (THERAGRAN) PER TABLET    Take 1 tablet by mouth once daily.    OLMESARTAN (BENICAR) 20 MG TABLET    Take 1 tablet (20 mg total) by mouth once daily.    PRAVASTATIN (PRAVACHOL) 40 MG TABLET    Take 1 tablet (40 mg total) by mouth once daily.    PREGABALIN (LYRICA) 50 MG CAPSULE    Take 50 mg by mouth 3 (three) times daily.    SERTRALINE (ZOLOFT) 100 MG TABLET    Take 1.5 tablets (150 mg total) by mouth once daily.    SODIUM BICARBONATE 650 MG TABLET    TAKE ONE TABLET BY MOUTH TWICE DAILY    TUMERIC-GING-OLIVE-OREG-CAPRYL 100 MG-150 MG- 50 MG-150 MG CAP    Take by mouth.    VITAMIN D2 1,250 MCG (50,000 UNIT) CAPSULE    TAKE ONE CAPSULE BY MOUTH EVERY 7 DAYS    ZINC ACETATE ORAL    Take 250 mg by  "mouth once daily.    Modified Medications    No medications on file   Discontinued Medications    No medications on file       Allergies:   Review of patient's allergies indicates:   Allergen Reactions    Corticosteroids (glucocorticoids) Nausea Only and Other (See Comments)     Stomach pain, dizziness, headache    Oxycodone Other (See Comments)     Blood pressure dropped       Social History:   Home town: [place], [place]  Occupation: Retired  Alcohol use: She reports no history of alcohol use.  Tobacco use: She reports that she quit smoking about 48 years ago. Her smoking use included cigarettes. She started smoking about 54 years ago. She has a 12 pack-year smoking history. She has never used smokeless tobacco.    Review of systems:  History of recent illness, fevers, shakes, or chills: no  History of cardiac problems or chest pain: HTN, HLD, CAD, AFIB  History of pulmonary problems or asthma: no  History of diabetes: no  History of prior dvt or clotting problems: no  History of sleep apnea: no  On Plavix      Physical Examination:  Estimated body mass index is 22.25 kg/m² as calculated from the following:    Height as of this encounter: 5' 4" (1.626 m).    Weight as of this encounter: 58.8 kg (129 lb 10.1 oz).    General  Healthy appearing female in no acute distress  Alert and oriented, normal mood, appropriate affect    Shoulder Examination:  Patient is alert and oriented, no distress. Skin is intact. Neuro is normal with no focal motor or sensory findings.    Cervical exam is unremarkable. Intact cervical ROM. Negative Spurling's test    Physical Exam:  RIGHT    LEFT    Scap Dyskinesis/Winging (-)    (-)    Tenderness:          Greater Tuberosity             +    (-)  Bicipital Groove  (-)    (-)  AC joint   (-)    (-)  Other:     ROM:  Forward Elevation 150    170  Abduction  90    120  ER (at side)  60    60  IR   deferred   deferred    Strength: "   Supraspinatus  3/5    5/5  Infraspinatus  3/5    5/5  Subscap / IR  3/5    5/5     Special Tests:   Neer:    +    (-)   Miller:   +    (-)   SS Stress:   +    (-)   Bear Hug:   (-)    (-)   [place]'s:   +    (-)   Resisted Thrower's:   +    (-)   Speed's   (-)    (-)   Cross Arm Abduction:  +    (-)    Neurovascular examination  - Motor grossly intact bilaterally to shoulder abduction, elbow flexion and extension, wrist flexion and extension, and intrinsic hand musculature  - Sensation intact to light touch bilaterally in axillary, median, radial, and ulnar distributions  - Symmetrical radial pulses      Imaging:  XR Results:  Results for orders placed during the hospital encounter of 10/29/24    X-Ray Shoulder 2 or more views Bilat    Narrative  EXAMINATION:  XR SHOULDER COMPLETE 2 OR MORE VIEWS BILATERAL    CLINICAL HISTORY:  Pain in right shoulder    TECHNIQUE:  XR SHOULDER COMPLETE 2 OR MORE VIEWS BILATERAL    COMPARISON:  None    FINDINGS:  No evidence of acute fracture.    Right: Moderate hypertrophy of the AC joint.  Moderate joint space narrowing of the glenohumeral joint.    Left: Mild degenerative changes of the AC joint.  Moderate to severe joint space narrowing of the glenohumeral joint.  Redemonstration of the rim remottling of the superolateral humeral head on the possibly from rotator cuff derangement?    Left-sided pacemaker in unchanged position.    Impression  As above.      Electronically signed by: Donato Perdomo  Date:    10/29/2024  Time:    12:58      Physician Read: I agree with the above impression.      Impression:  84 y.o. female with right rotator cuff tear arthropathy      Assessment & Plan    LIFESTYLE:  - Advised putting more weight on the left side when using a walker to alleviate pressure on the right shoulder    MEDICATIONS PRESCRIBED:  - Recommend taking Tylenol arthritis 1000mg twice daily (morning and evening), with an optional 1000mg dose at lunch if needed, up to a maximum of  3000mg per day. She is unable to take oral anti-inflammatories due to being on Plavix  - Suggested trying Salonpas patches (OTC lidocaine patches) on the shoulder area for pain relief  - Prescribed a corticosteroid injection into the right shoulder joint, patient is in agreement with the plan.  Right shoulder subacromial corticosteroid injection given in clinic, patient tolerated the procedure well with no immediate complications    PROCEDURES:  - Discussed and recommended a corticosteroid injection into the shoulder joint, targeting the arthritis areas. Explained that this injection is different from the previous one given by Dr. PANIAGUA, as it targets the joint space. Informed the patient that relief may take up to a week to be noticeable. Administered the corticosteroid injection during the visit.  - Discussed the possibility of shoulder replacement surgery (reverse replacement) as a future option if conservative treatments fail, but agreed to try injections first    FOLLOW UP:  - Follow up in 3 months for potential repeat injection if needed. Assess effectiveness of the injection and discuss further treatment options if necessary.           Kera Alonso PA-C  Sports Medicine Physician Assistant       Disclaimer: This note was prepared using a voice recognition system and is likely to have sound alike errors within the text.      This note was generated with the assistance of ambient listening technology. Verbal consent was obtained by the patient and accompanying visitor(s) for the recording of patient appointment to facilitate this note. I attest to having reviewed and edited the generated note for accuracy, though some syntax or spelling errors may persist. Please contact the author of this note for any clarification.

## 2024-11-11 NOTE — PROCEDURES
Large Joint Aspiration/Injection: R subacromial bursa    Date/Time: 11/11/2024 11:00 AM    Performed by: Kera Alonso PA-C  Authorized by: Kera Alonso PA-C    Consent Done?:  Yes (Verbal)  Indications:  Pain  Site marked: the procedure site was marked    Timeout: prior to procedure the correct patient, procedure, and site was verified    Prep: patient was prepped and draped in usual sterile fashion      Local anesthesia used?: Yes    Anesthesia:  Local infiltration  Local anesthetic:  Lidocaine 1% without epinephrine    Details:  Needle Size:  22 G  Ultrasonic Guidance for needle placement?: No    Approach:  Posterior  Location:  Shoulder  Site:  R subacromial bursa  Medications:  40 mg triamcinolone acetonide 40 mg/mL  Patient tolerance:  Patient tolerated the procedure well with no immediate complications    Procedure Note:  We discussed the risk and benefits of injections, including pain, infection, bleeding, damage to adjacent structures, risk of reaction to injection. We discussed the steroid/cortisone injections will not heal the problem but mat help decrease inflammation and help with symptoms. We discussed the risk of repeated injections. The patient expressed understanding and wanted to proceed with the injection. We performed a timeout to verify the proper patient, proper procedure, and the proper site. The injection site was prepared in a sterile fashion. The patient tolerated it well and there were no complication. We did discuss with the patient that steroid injections can cause some increase in blood sugar and blood pressure for up to a week after the injection.

## 2024-11-11 NOTE — PATIENT INSTRUCTIONS
"Shoulder Arthritis    This information does not include all information related to shoulder replacement. For more information please visit the [employer] website using the following link: Shoulder Arthritis    Each year, millions of people worldwide are diagnosed with some form of arthritis. Simply defined, arthritis is inflammation of one or more of your joints. In a diseased shoulder, this inflammation causes pain and stiffness that can make it difficult to lift your arm, brush your hair or reach up to a high shelf.    Although there is no cure for arthritis of the shoulder, there are many treatment options available. Using these, most people with arthritis are able to manage pain and stay active.    Anatomy  Your shoulder is made up of three bones:  Upper arm bone (humerus)  Shoulder blade (scapula)  Collarbone (clavicle)    The head of your upper arm bone fits into a rounded socket in your shoulder blade. This socket is called the glenoid. A combination of muscles and tendons keeps your arm bone centered in your shoulder socket. These tissues are called the rotator cuff.    There are two joints in the shoulder, and both may be affected by arthritis. One joint is located where the clavicle meets the tip of the shoulder blade (acromion). This is called the acromioclavicular (AC) joint. The second is where the head of the humerus fits into the scapula and is called the glenohumeral joint.        Description  Five major types of arthritis typically affect the shoulder.    Osteoarthritis  Also known as "wear-and-tear" arthritis, osteoarthritis is a condition that destroys the smooth outer covering (articular cartilage) of bone. As the articular cartilage wears away, it becomes frayed and rough, and the protective space between the bones decreases. During movement, the bones of the joint rub against each other,  causing pain; this is often referred to as "bone on bone" " arthritis.    Osteoarthritis usually affects people over the age of 50 and is more common in the acromioclavicular joint than in the glenohumeral shoulder joint.        Rheumatoid Arthritis  Rheumatoid arthritis (RA) is a chronic disease that attacks multiple joints throughout the body. It is symmetrical, meaning that it usually affects the same joint on both sides of the body. The joints of your body are covered with a lining -- called synovium -- that lubricates the joint and makes it easier to move. Rheumatoid arthritis causes the lining to swell, which causes pain and stiffness in the joint. Rheumatoid arthritis is an autoimmune disease. This means that the immune system attacks its own tissues. In RA, the defenses that protect the body from infection instead damage normal tissue (such as cartilage and ligaments) and soften bone. Rheumatoid arthritis is equally common in both joints of the shoulder.    Posttraumatic Arthritis  Posttraumatic arthritis is a form of osteoarthritis that develops after an injury, such as a fracture or dislocation of the shoulder.    Rotator Cuff Tear Arthropathy  Arthritis can also develop after a large, long-standing rotator cuff tendon tear. The torn rotator cuff can no longer hold the head of the humerus in the glenoid socket, and the humerus can move upward and rub against the acromion. This can damage the surfaces of the bones, causing arthritis to develop. The combination of a large rotator cuff tear and advanced arthritis can lead to severe pain and weakness, and the patient may not be able to lift their arm away from the side.        Avascular Necrosis  Avascular necrosis (AVN) of the shoulder is a painful condition that occurs when the blood supply to the head of the humerus is disrupted. Because bone cells die without a blood supply, AVN can ultimately lead to destruction of the shoulder joint and arthritis.    Avascular necrosis develops in stages. As it progresses, the  dead bone gradually collapses, which damages the articular cartilage covering the bone and leads to arthritis. At first, AVN affects only the head of the humerus, but as AVN progresses, the collapsed head of the humerus can damage the glenoid socket. Causes of AVN include high dose steroid use, heavy alcohol consumption, sickle cell disease, and traumatic injury, such as fractures of the shoulder. In some cases, no cause can be identified; this is referred to as idiopathic AVN.    Symptoms  Pain: The most common symptom of arthritis of the shoulder is pain. This pain is aggravated by activity and progressively gets worse over time. The location of the pain will vary, depending on which shoulder joint is affected:  If the glenohumeral shoulder joint is affected, the pain is centered in the side or back of the shoulder and may intensify with changes in the weather. Patients complain of an ache deep in the joint.  The pain of arthritis in the acromioclavicular (AC) joint is focused on the top of the shoulder. This pain can sometimes radiate or travel to the side of the neck.  Someone with rheumatoid arthritis may have pain throughout the shoulder if both the glenohumeral and AC joints are affected.  Limited range of motion: Limited motion is another common symptom. It may become more difficult to lift your arm to comb your hair or reach up to a shelf.   Crepitus: You may hear a grinding, clicking, or snapping sound (crepitus) as you move your shoulder. Crepitus is sometimes painful and can be loud enough for other people to hear,    As the disease progresses, any movement of the shoulder causes pain. Night pain is common, and sleeping may  be difficult.     Doctor Examination    Medical History and Physical Examination  After discussing your symptoms and medical history, your doctor will examine your shoulder. During the physical examination, your doctor will look for:  Weakness (atrophy) in the muscles  Tenderness to  touch  Extent of passive (assisted) and active (self-directed) range of motion  Any signs of injury to the muscles, tendons, and ligaments surrounding the joint  Signs of previous injuries or surgeries  Involvement of other joints (an indication of rheumatoid arthritis)  Crepitus (a grating sensation inside the joint) with movement  Pain when pressure is placed on the joint    Imaging Tests  X-Rays: X-rays are imaging tests that create detailed pictures of dense structures, like bone. They can help distinguish among various forms of arthritis. X-rays of an arthritic shoulder will show a narrowing of the joint space, changes in the bone, and the formation of bone spurs (osteophytes).      Other imaging: If your doctor is planning to proceed with total shoulder replacement he may need additional imaging such as magnetic resonance imaging (MRI) or computed topography (CT) scan.   MRI: may be needed to evaluate the status of your rotator cuff muscles to help determine which type of shoulder replacement is best  CT scan: May be needed to evaluate how much bone is remaining in the joint if the bone has been worn down over time due to the bones rubbing together. Knowing how much bone is left helps to help determine which type of shoulder replacement is best      Treatment    Nonsurgical Treatment  As with other arthritic conditions, initial treatment of arthritis of the shoulder is nonsurgical. Your doctor may recommend the following:  Rest or change in activities. You may need to change the way you move your arm to avoid provoking pain.  Physical therapy exercises may improve the range of motion, strength, and function in your shoulder.   Nonsteroidal anti-inflammatory drugs (NSAIDs), such as aspirin, ibuprofen, or naproxen, may reduce inflammation and pain. These medications can irritate the stomach lining and cause internal bleeding. They should be taken with food. Consult with your doctor before taking over-the-counter  NSAIDs if you have a history of ulcers or are taking blood thinning medication.  Corticosteroid injections in the shoulder can dramatically reduce the inflammation and pain. However, the effect is often temporary.  Moist heat may provide temporary relief.  Icing your shoulder for 20 to 30 minutes two or three times a day to reduce inflammation and ease pain.  If you have rheumatoid arthritis, your doctor (typically a rheumatologist) may prescribe a disease-modifying drug, such as methotrexate.  Dietary supplements, such as glucosamine and chondroitin sulfate may help relieve pain. (Note: There is little scientific evidence to support the use of glucosamine and chondroitin sulfate to treat arthritis. In addition, the [place] Food and Drug Administration does not test dietary supplements. These compounds may cause negative interactions with other medications. Always consult your doctor before taking dietary supplements.)    Surgical Treatment  Your doctor may consider surgery if your pain causes disability and is not relieved with nonsurgical treatment.    Arthroscopy: Cases of mild glenohumeral arthritis may be treated with arthroscopy, During arthroscopy, the doctor inserts a small camera, called an arthroscope, into the shoulder joint. The camera displays pictures on a video monitor, and the doctor uses these images to guide miniature surgical instruments. Because the arthroscope and surgical instruments are thin, the surgeon can use very small incisions  rather than the larger incision needed for standard, open surgery. During the procedure, your doctor can debride (clean out) the inside of the joint. Although the procedure provides pain relief, it will not eliminate the arthritis from the joint. If the arthritis progresses, further surgery may be needed in the future.  Shoulder joint replacement (arthroplasty): Advanced arthritis of the glenohumeral joint can be treated with shoulder replacement surgery. In this  procedure, damaged parts of the shoulder are removed and replaced with artificial components, called a prosthesis.    Replacement surgery options include:  Hemiarthroplasty: Just the head of the humerus is replaced by an artificial component.  Total shoulder arthroplasty: Both the head of the humerus and the glenoid are replaced. A plastic cup is fitted into the glenoid, and a metal ball is attached to the top of the humerus.  Reverse total shoulder arthroplasty: In a reverse total shoulder replacement, the socket and metal ball are opposite a conventional total shoulder arthroplasty. The metal ball is fixed to the glenoid, and the plastic cup is fixed to the upper end of the humerus. A reverse total shoulder replacement works better for people with cuff tear arthropathy because it relies on different muscles -- not the rotator cuff -- to move the arm.        After Your Surgery    Recovery  Surgical treatment of arthritis of the shoulder is generally very effective in reducing pain and restoring motion. Recovery time and rehabilitation plans depend upon the type of surgery performed. Other factors, such as comorbidities (other medical conditions, such as high blood pressure or diabetes), smoking, and depression, can also affect outcomes after surgery.    Pain management   After surgery, you will feel some pain. This is a natural part of the healing process. Your doctor and nurses will work to reduce your pain, which can help you recover from surgery faster. Medications are often prescribed for short-term pain relief after surgery. Many types of medicines are available to help manage pain, including opioids, non-steroidal anti-inflammatory drugs (NSAIDs), and local anesthetics. Your doctor may use a combination of these medications to improve pain relief, as well as minimize the need for opioids.    Be aware that although opioids help relieve pain after surgery, they are a narcotic and can be addictive. Opioid  dependency and overdose have become critical public health issues in the [place] It is important to use opioids only as directed by your doctor and to stop taking them as soon as your pain begins to improve. Talk to your doctor if your pain has not begun to improve within a few days of your surgery.    Complications  As with all surgeries, there are some risks and possible complications. Potential problems after shoulder surgery include infection, excessive bleeding, blood clots, and damage to blood vessels or nerves. Your doctor will discuss the possible complications with you before your operation.    Future Developments  Research is being conducted on shoulder arthritis and its treatment.  In many cases, it is not known why some people develop arthritis and others do not. Research is being done to uncover some of the causes of arthritis of the shoulder.  Joint lubricants, which are currently being used for treatment of knee arthritis, are being studied in the shoulder.  New medications to treat rheumatoid arthritis are being investigated.  Much research is being done on shoulder joint replacement surgery, including the development of different joint prosthesis designs.  Researchers are studying the use of biologic materials to resurface an arthritic shoulder. Biologic materials are tissue grafts that promote growth of new tissue in the body and foster healing.    Links      Shoulder Arthritis  [employer] Clinical Practice Guideline on the Management of Glenohumeral Joint Arthritis  Total Shoulder Replacement  Reverse Total Shoulder Replacement  Preparing for Joint Replacement Surgery  Reverse Total Shoulder Replacement Video

## 2024-11-12 DIAGNOSIS — I95.1 POSTURAL HYPOTENSION: ICD-10-CM

## 2024-11-12 DIAGNOSIS — I48.0 PAROXYSMAL ATRIAL FIBRILLATION: ICD-10-CM

## 2024-11-12 DIAGNOSIS — I25.5 ISCHEMIC CARDIOMYOPATHY: ICD-10-CM

## 2024-11-12 DIAGNOSIS — Z95.0 BIVENTRICULAR CARDIAC PACEMAKER IN SITU: ICD-10-CM

## 2024-11-12 DIAGNOSIS — I48.0 PAROXYSMAL ATRIAL FIBRILLATION: Primary | ICD-10-CM

## 2024-11-12 RX ORDER — METOPROLOL TARTRATE 25 MG/1
25 TABLET, FILM COATED ORAL 2 TIMES DAILY
Qty: 60 TABLET | Refills: 6 | Status: SHIPPED | OUTPATIENT
Start: 2024-11-12

## 2024-11-12 RX ORDER — METOPROLOL TARTRATE 25 MG/1
25 TABLET, FILM COATED ORAL 2 TIMES DAILY
Qty: 60 TABLET | Refills: 6 | Status: SHIPPED | OUTPATIENT
Start: 2024-11-12 | End: 2024-11-12 | Stop reason: SDUPTHER

## 2024-11-18 DIAGNOSIS — M1A.09X0 IDIOPATHIC CHRONIC GOUT, MULTIPLE SITES, WITHOUT TOPHUS (TOPHI): ICD-10-CM

## 2024-11-18 RX ORDER — ALLOPURINOL 300 MG/1
300 TABLET ORAL DAILY
Qty: 90 TABLET | Refills: 11 | Status: SHIPPED | OUTPATIENT
Start: 2024-11-18

## 2024-12-11 DIAGNOSIS — I25.10 CORONARY ARTERY DISEASE INVOLVING NATIVE CORONARY ARTERY OF NATIVE HEART WITHOUT ANGINA PECTORIS: Chronic | ICD-10-CM

## 2024-12-11 DIAGNOSIS — I25.10 ATHEROSCLEROTIC HEART DISEASE OF NATIVE CORONARY ARTERY WITHOUT ANGINA PECTORIS: ICD-10-CM

## 2024-12-11 DIAGNOSIS — E03.9 HYPOTHYROIDISM (ACQUIRED): Chronic | ICD-10-CM

## 2024-12-11 RX ORDER — LEVOTHYROXINE SODIUM 100 UG/1
TABLET ORAL
Qty: 90 TABLET | Refills: 6 | Status: SHIPPED | OUTPATIENT
Start: 2024-12-11

## 2024-12-11 RX ORDER — CLOPIDOGREL BISULFATE 75 MG/1
75 TABLET ORAL
Qty: 30 TABLET | Refills: 6 | Status: SHIPPED | OUTPATIENT
Start: 2024-12-11

## 2024-12-11 RX ORDER — PRAVASTATIN SODIUM 40 MG/1
40 TABLET ORAL DAILY
Qty: 90 TABLET | Refills: 6 | Status: SHIPPED | OUTPATIENT
Start: 2024-12-11

## 2024-12-16 ENCOUNTER — HOSPITAL ENCOUNTER (EMERGENCY)
Facility: HOSPITAL | Age: 84
Discharge: HOME | End: 2024-12-16
Attending: EMERGENCY MEDICINE
Payer: MEDICARE

## 2024-12-16 VITALS
SYSTOLIC BLOOD PRESSURE: 175 MMHG | RESPIRATION RATE: 19 BRPM | OXYGEN SATURATION: 100 % | WEIGHT: 130.63 LBS | TEMPERATURE: 97 F | BODY MASS INDEX: 22.42 KG/M2 | HEART RATE: 60 BPM | DIASTOLIC BLOOD PRESSURE: 90 MMHG

## 2024-12-16 DIAGNOSIS — S09.90XA CLOSED HEAD INJURY, INITIAL ENCOUNTER: Primary | ICD-10-CM

## 2024-12-16 DIAGNOSIS — S01.81XA FACIAL LACERATION, INITIAL ENCOUNTER: ICD-10-CM

## 2024-12-16 DIAGNOSIS — T07.XXXA MULTIPLE ABRASIONS: ICD-10-CM
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
## 2024-12-16 LAB
ALBUMIN SERPL BCP-MCNC: 4 G/DL (ref 3.5–5.2)
ALP SERPL-CCNC: 45 U/L (ref 40–150)
ALT SERPL W/O P-5'-P-CCNC: 27 U/L (ref 10–44)
ANION GAP SERPL CALC-SCNC: 13 MMOL/L (ref 8–16)
AST SERPL-CCNC: 30 U/L (ref 10–40)
BASOPHILS # BLD AUTO: 0.02 K/UL (ref 0–0.2)
BASOPHILS NFR BLD: 0.3 % (ref 0–1.9)
BILIRUB SERPL-MCNC: 0.4 MG/DL (ref 0.1–1)
BUN SERPL-MCNC: 39 MG/DL (ref 8–23)
CALCIUM SERPL-MCNC: 9.4 MG/DL (ref 8.7–10.5)
CHLORIDE SERPL-SCNC: 112 MMOL/L (ref 95–110)
CO2 SERPL-SCNC: 16 MMOL/L (ref 23–29)
CREAT SERPL-MCNC: 1.9 MG/DL (ref 0.5–1.4)
DIFFERENTIAL METHOD BLD: ABNORMAL
EOSINOPHIL # BLD AUTO: 0.1 K/UL (ref 0–0.5)
EOSINOPHIL NFR BLD: 0.9 % (ref 0–8)
ERYTHROCYTE [DISTWIDTH] IN BLOOD BY AUTOMATED COUNT: 16.3 % (ref 11.5–14.5)
EST. GFR  (NO RACE VARIABLE): 26 ML/MIN/1.73 M^2
GLUCOSE SERPL-MCNC: 92 MG/DL (ref 70–110)
HCT VFR BLD AUTO: 37.6 % (ref 37–48.5)
HGB BLD-MCNC: 12.1 G/DL (ref 12–16)
IMM GRANULOCYTES # BLD AUTO: 0.03 K/UL (ref 0–0.04)
IMM GRANULOCYTES NFR BLD AUTO: 0.4 % (ref 0–0.5)
LYMPHOCYTES # BLD AUTO: 2.9 K/UL (ref 1–4.8)
LYMPHOCYTES NFR BLD: 37.9 % (ref 18–48)
MCH RBC QN AUTO: 34.1 PG (ref 27–31)
MCHC RBC AUTO-ENTMCNC: 32.2 G/DL (ref 32–36)
MCV RBC AUTO: 106 FL (ref 82–98)
MONOCYTES # BLD AUTO: 0.5 K/UL (ref 0.3–1)
MONOCYTES NFR BLD: 6.3 % (ref 4–15)
NEUTROPHILS # BLD AUTO: 4.2 K/UL (ref 1.8–7.7)
NEUTROPHILS NFR BLD: 54.2 % (ref 38–73)
NRBC BLD-RTO: 0 /100 WBC
PLATELET # BLD AUTO: 208 K/UL (ref 150–450)
PMV BLD AUTO: 9.9 FL (ref 9.2–12.9)
POTASSIUM SERPL-SCNC: 4.7 MMOL/L (ref 3.5–5.1)
PROT SERPL-MCNC: 7.1 G/DL (ref 6–8.4)
RBC # BLD AUTO: 3.55 M/UL (ref 4–5.4)
SODIUM SERPL-SCNC: 141 MMOL/L (ref 136–145)
WBC # BLD AUTO: 7.68 K/UL (ref 3.9–12.7)

## 2024-12-16 PROCEDURE — 90715 TDAP VACCINE 7 YRS/> IM: CPT | Mod: HCNC | Performed by: EMERGENCY MEDICINE

## 2024-12-16 PROCEDURE — 25000003 PHARM REV CODE 250: Mod: HCNC | Performed by: EMERGENCY MEDICINE

## 2024-12-16 PROCEDURE — 12013 RPR F/E/E/N/L/M 2.6-5.0 CM: CPT | Mod: HCNC

## 2024-12-16 PROCEDURE — 63600175 PHARM REV CODE 636 W HCPCS: Mod: HCNC | Performed by: EMERGENCY MEDICINE

## 2024-12-16 PROCEDURE — 99285 EMERGENCY DEPT VISIT HI MDM: CPT | Mod: 25,HCNC

## 2024-12-16 PROCEDURE — 90471 IMMUNIZATION ADMIN: CPT | Mod: HCNC | Performed by: EMERGENCY MEDICINE

## 2024-12-16 PROCEDURE — 80053 COMPREHEN METABOLIC PANEL: CPT | Mod: HCNC | Performed by: EMERGENCY MEDICINE

## 2024-12-16 PROCEDURE — 85025 COMPLETE CBC W/AUTO DIFF WBC: CPT | Mod: HCNC | Performed by: EMERGENCY MEDICINE

## 2024-12-16 RX ORDER — LIDOCAINE HYDROCHLORIDE 10 MG/ML
10 INJECTION, SOLUTION EPIDURAL; INFILTRATION; INTRACAUDAL; PERINEURAL
Status: COMPLETED | OUTPATIENT
Start: 2024-12-16 | End: 2024-12-16

## 2024-12-16 RX ORDER — ORPHENADRINE CITRATE 100 MG/1
100 TABLET, EXTENDED RELEASE ORAL 2 TIMES DAILY
Qty: 10 TABLET | Refills: 0 | Status: SHIPPED | OUTPATIENT
Start: 2024-12-16

## 2024-12-16 RX ADMIN — LIDOCAINE HYDROCHLORIDE 100 MG: 10 INJECTION, SOLUTION EPIDURAL; INFILTRATION; INTRACAUDAL at 06:12

## 2024-12-16 RX ADMIN — BACITRACIN ZINC, NEOMYCIN, POLYMYXIN B: 400; 3.5; 5 OINTMENT TOPICAL at 06:12

## 2024-12-16 RX ADMIN — CLOSTRIDIUM TETANI TOXOID ANTIGEN (FORMALDEHYDE INACTIVATED), CORYNEBACTERIUM DIPHTHERIAE TOXOID ANTIGEN (FORMALDEHYDE INACTIVATED), BORDETELLA PERTUSSIS TOXOID ANTIGEN (GLUTARALDEHYDE INACTIVATED), BORDETELLA PERTUSSIS FILAMENTOUS HEMAGGLUTININ ANTIGEN (FORMALDEHYDE INACTIVATED), BORDETELLA PERTUSSIS PERTACTIN ANTIGEN, AND BORDETELLA PERTUSSIS FIMBRIAE 2/3 ANTIGEN 0.5 ML: 5; 2; 2.5; 5; 3; 5 INJECTION, SUSPENSION INTRAMUSCULAR at 04:12

## 2024-12-16 NOTE — ED PROVIDER NOTES
SCRIBE #1 NOTE: I, Naseem Rg, am scribing for, and in the presence of, Dustin Marcum DO. I have scribed the entire note.       History     Chief Complaint   Patient presents with    Fall     Pt tripped over a carpet at grocery store. PT c/o contusion to left hand w/ skin tear - bleeding controlled, contusion to right hand and right elbow, laceration to chin, and abrasion to left knee.     Review of patient's allergies indicates:   Allergen Reactions    Corticosteroids (glucocorticoids) Nausea Only and Other (See Comments)     Stomach pain, dizziness, headache    Oxycodone Other (See Comments)     Blood pressure dropped         History of Present Illness     HPI    12/16/2024, 4:37 PM  History obtained from the patient      History of Present Illness: Violet Swenson is a 84 y.o. female patient with a PMHx of HTN, HLD, hypothyroidism, CAD, pneumonia, pacemaker, gout, blood transfusion, psychiatric care, CHF, cancer, Afib, anemia, osteoporosis, and a stroke who presents to the Emergency Department for evaluation of a ground-level fall which occurred just pta, as pt reports she tripped over a rug at [employer]. Pt presents with skin tears and contusions to her right elbow, right hand, L knee, and L wrist as well as a laceration to her chin. A nurse on scene at [employer] wrapped her L wrist, and her other wounds were wrapped in the ambulance. Bleeding is controlled. Symptoms are constant and moderate in severity. Pt's L wrist pain is exacerbated with movement or any palpation. Associated sxs include a HA. Patient denies any LOC, blurred vision, and all other sxs at this time. Pt takes Plavix and Eliquis. No further complaints or concerns at this time.       Arrival mode: Ambulance Service    PCP: Marti Coronel MD        Past Medical History:  Past Medical History:   Diagnosis Date    Age-related osteoporosis without current pathological fracture 8/20/2018    Anemia     Anxiety      Arthritis     Atrial flutter     Cancer     lymphoma Large cell B    CHF (congestive heart failure)     Chronic anemia 4/26/2017    Chronic midline low back pain with right-sided sciatica 8/20/2018    Coronary artery disease     01/2015 LHC patent LCX. 50% stenosis in LAD and RCA.      Depression     Disorder of kidney and ureter     Encounter for blood transfusion     GERD (gastroesophageal reflux disease)     Gout, arthritis     Heart failure     Hx of psychiatric care     Hyperlipidemia     Hypertension     Hypothyroidism     Immune deficiency disorder     Kidney disease     Lung nodule 2014    RML--stable    Obesity     Pacemaker     Metronic    Paroxysmal atrial fibrillation 3/15/2018    Pneumonia     Polyneuropathy     chemo induced    Psychiatric problem     Rheumatoid arthritis involving multiple sites with positive rheumatoid factor 4/19/2023    Stroke 11/16/2020    Tobacco dependence     quit 1976    Trouble in sleeping     Unstable angina 11/27/2020       Past Surgical History:  Past Surgical History:   Procedure Laterality Date    APPENDECTOMY  1966 approx    CARDIAC PACEMAKER PLACEMENT  01/22/2015    CHOLECYSTECTOMY  1993    incidental at time of gastric bypass    COLON SURGERY Right 2017    hemicolectomy    COLONOSCOPY N/A 4/6/2017    Procedure: COLONOSCOPY;  Surgeon: Tye Enamorado MD;  Location: [facility];  Service: Endoscopy;  Laterality: N/A;    COLONOSCOPY N/A 11/28/2018    Procedure: COLONOSCOPY;  Surgeon: aSúl Arthur III, MD;  Location: [facility];  Service: Endoscopy;  Laterality: N/A;    CORONARY ANGIOPLASTY  02/2014    CORONARY STENT PLACEMENT  02/05/2014    ESOPHAGOGASTRODUODENOSCOPY N/A 11/28/2018    Procedure: EGD (ESOPHAGOGASTRODUODENOSCOPY);  Surgeon: Saúl Arthur III, MD;  Location: [facility];  Service: Endoscopy;  Laterality: N/A;    GASTRIC BYPASS  1993    with incidental choly    HERNIA REPAIR      IMPLANTATION OF BIVENTRICULAR PERMANENT PACEMAKER AS UPGRADE TO EXISTING  "PACEMAKER Left 7/13/2023    Procedure: Biventricular pacemaker upgrade/His lead vs CRT-P;  Surgeon: Velasquez Vizcaino MD;  Location: [facility] CATH LAB;  Service: Cardiology;  Laterality: Left;  Will need to upgrade her pacemaker.  Ideally his pacing lead would be the best approach. MDT/ Alternatively, an upgrade /His lead as Plan A  and  CRT if fails/notified MDT rep Mell and Arik  NOT MRI safe   Pacer and leads implanted    INJECTION OF ANESTHETIC AGENT INTO SACROILIAC JOINT Right 10/8/2020    Procedure: Right BLOCK, SACROILIAC JOINT with RN IV sedation;  Surgeon: Madi Anton MD;  [facility];  Service: Pain Management;  Laterality: Right;    JOINT REPLACEMENT Bilateral 2009    3 months apart    TONSILLECTOMY  1959    VENOGRAM, CATH LAB Bilateral 6/29/2023    Procedure: Venogram, Cath Lab;  Surgeon: Velasquez Vizcaino MD;  Location: [facility] CATH LAB;  Service: Cardiology;  Laterality: Bilateral;  1:00PM arrival time  NOT MRI safe   Pacer and leads implanted by [facility] 1/22/15   MDTR SEDR01 [place], [id number]   A lead: SAMEER 5076 CapSure Fix [employer], [id number]   RV lead: SAMEER 5076 CapSure Fix [employer], [id number]         Family History:  Family History   Problem Relation Name Age of Onset    Heart disease Mother      Hypertension Mother      Cataracts Mother      Stomach cancer Father          "ulcers that turned to cancer"    Cancer Father          stomach    Pancreatic cancer Sister      Cancer Sister          pancreatic    Leukemia Brother          "leukemia which led to intestinal cancer"    Cataracts Brother      Cancer Brother          leukemia then later stomach cancer    Drug abuse Son      Cancer Son          prostate cancer    Prostate cancer Son      COPD Daughter      Asthma Daughter      Hypertension Maternal Grandfather      Stroke Maternal Grandfather      Alcohol abuse Neg Hx      Diabetes Neg Hx      Intellectual disability Neg Hx      Mental illness Neg Hx         Social History:  Social " History     Tobacco Use    Smoking status: Former     Current packs/day: 0.00     Average packs/day: 2.0 packs/day for 6.0 years (12.0 ttl pk-yrs)     Types: Cigarettes     Start date: 3/15/1970     Quit date: 3/15/1976     Years since quittin.8    Smokeless tobacco: Never   Substance and Sexual Activity    Alcohol use: No     Alcohol/week: 0.0 standard drinks of alcohol    Drug use: No    Sexual activity: Not Currently     Partners: Male        Review of Systems     Review of Systems   Eyes:  Negative for visual disturbance (blurred).   Skin:  Positive for wound (contusions and skin tears to RUE, L wrist, and L knee as well as laceration to chin).   Neurological:  Positive for headaches. Negative for syncope.      Physical Exam     Initial Vitals [24 1353]   BP Pulse Resp Temp SpO2   (!) 177/98 82 20 97 °F (36.1 °C) 99 %      MAP       --          Physical Exam  Vitals reviewed.   Constitutional:       Appearance: Normal appearance.   HENT:      Head: Normocephalic and atraumatic.      Comments: Laceration noted below the chin, otherwise normal facial exam  Neck:      Comments: No midline tenderness to palpation, step-offs, crepitus noted to the cervical spine  Cardiovascular:      Rate and Rhythm: Normal rate and regular rhythm.      Heart sounds: No murmur heard.  Pulmonary:      Breath sounds: No wheezing.   Abdominal:      General: There is no distension.      Palpations: Abdomen is soft.      Tenderness: There is no abdominal tenderness.   Musculoskeletal:      Comments: No midline tenderness to palpation, step-offs, crepitus noted to the thoracic or lumbar spine, no obvious deformity noted to all 4 extremities   Skin:     Comments: Numerous skin tears with large skin tear to the hand and wrist, see picture   Neurological:      General: No focal deficit present.      Mental Status: She is alert and oriented to person, place, and time.       Nursing Notes and Vital Signs Reviewed.         ED Course    Lac Repair    Date/Time: 12/16/2024 7:16 PM    Performed by: Dustin Marcum DO  Authorized by: Dustin Marcum DO    Consent:     Consent obtained:  Verbal    Consent given by:  Patient    Risks, benefits, and alternatives were discussed: yes      Risks discussed:  Infection, need for additional repair, poor cosmetic result, pain and poor wound healing    Alternatives discussed:  No treatment, delayed treatment and referral  Universal protocol:     Procedure explained and questions answered to patient or proxy's satisfaction: yes      Relevant documents present and verified: yes      Test results available: yes      Imaging studies available: yes      Immediately prior to procedure, a time out was called: yes      Patient identity confirmed:  Verbally with patient, arm band and hospital-assigned identification number  Anesthesia:     Anesthesia method:  Local infiltration    Local anesthetic:  Lidocaine 1% w/o epi  Laceration details:     Location:  Face    Face location:  Chin    Length (cm):  3  Pre-procedure details:     Preparation:  Patient was prepped and draped in usual sterile fashion and imaging obtained to evaluate for foreign bodies  Exploration:     Limited defect created (wound extended): no    Treatment:     Area cleansed with:  Soap and water    Amount of cleaning:  Standard    Debridement:  None    Undermining:  None  Skin repair:     Repair method:  Sutures    Suture size:  4-0    Suture material:  Prolene    Suture technique:  Simple interrupted    Number of sutures:  4  Approximation:     Approximation:  Close  Repair type:     Repair type:  Simple  Post-procedure details:     Dressing:  Non-adherent dressing, sterile dressing and antibiotic ointment    Procedure completion:  Tolerated well, no immediate complications    ED Vital Signs:  Vitals:    12/16/24 1353 12/16/24 1655 12/16/24 1830 12/16/24 1952   BP: (!) 177/98  (!) 180/95 (!) 175/90   Pulse: 82  60 60   Resp: 20  18 19    Temp: 97 °F (36.1 °C)      TempSrc: Oral      SpO2: 99%  99% 100%   Weight:  59.2 kg (130 lb 9.6 oz)         Abnormal Lab Results:  Labs Reviewed   CBC W/ AUTO DIFFERENTIAL - Abnormal       Result Value    WBC 7.68      RBC 3.55 (*)     Hemoglobin 12.1      Hematocrit 37.6       (*)     MCH 34.1 (*)     MCHC 32.2      RDW 16.3 (*)     Platelets 208      MPV 9.9      Immature Granulocytes 0.4      Gran # (ANC) 4.2      Immature Grans (Abs) 0.03      Lymph # 2.9      Mono # 0.5      Eos # 0.1      Baso # 0.02      nRBC 0      Gran % 54.2      Lymph % 37.9      Mono % 6.3      Eosinophil % 0.9      Basophil % 0.3      Differential Method Automated     COMPREHENSIVE METABOLIC PANEL - Abnormal    Sodium 141      Potassium 4.7      Chloride 112 (*)     CO2 16 (*)     Glucose 92      BUN 39 (*)     Creatinine 1.9 (*)     Calcium 9.4      Total Protein 7.1      Albumin 4.0      Total Bilirubin 0.4      Alkaline Phosphatase 45      AST 30      ALT 27      eGFR 26 (*)     Anion Gap 13          All Lab Results:  Results for orders placed or performed during the hospital encounter of 12/16/24   CBC auto differential    Collection Time: 12/16/24  5:01 PM   Result Value Ref Range    WBC 7.68 3.90 - 12.70 K/uL    RBC 3.55 (L) 4.00 - 5.40 M/uL    Hemoglobin 12.1 12.0 - 16.0 g/dL    Hematocrit 37.6 37.0 - 48.5 %     (H) 82 - 98 fL    MCH 34.1 (H) 27.0 - 31.0 pg    MCHC 32.2 32.0 - 36.0 g/dL    RDW 16.3 (H) 11.5 - 14.5 %    Platelets 208 150 - 450 K/uL    MPV 9.9 9.2 - 12.9 fL    Immature Granulocytes 0.4 0.0 - 0.5 %    Gran # (ANC) 4.2 1.8 - 7.7 K/uL    Immature Grans (Abs) 0.03 0.00 - 0.04 K/uL    Lymph # 2.9 1.0 - 4.8 K/uL    Mono # 0.5 0.3 - 1.0 K/uL    Eos # 0.1 0.0 - 0.5 K/uL    Baso # 0.02 0.00 - 0.20 K/uL    nRBC 0 0 /100 WBC    Gran % 54.2 38.0 - 73.0 %    Lymph % 37.9 18.0 - 48.0 %    Mono % 6.3 4.0 - 15.0 %    Eosinophil % 0.9 0.0 - 8.0 %    Basophil % 0.3 0.0 - 1.9 %    Differential Method Automated     Comprehensive metabolic panel    Collection Time: 12/16/24  5:01 PM   Result Value Ref Range    Sodium 141 136 - 145 mmol/L    Potassium 4.7 3.5 - 5.1 mmol/L    Chloride 112 (H) 95 - 110 mmol/L    CO2 16 (L) 23 - 29 mmol/L    Glucose 92 70 - 110 mg/dL    BUN 39 (H) 8 - 23 mg/dL    Creatinine 1.9 (H) 0.5 - 1.4 mg/dL    Calcium 9.4 8.7 - 10.5 mg/dL    Total Protein 7.1 6.0 - 8.4 g/dL    Albumin 4.0 3.5 - 5.2 g/dL    Total Bilirubin 0.4 0.1 - 1.0 mg/dL    Alkaline Phosphatase 45 40 - 150 U/L    AST 30 10 - 40 U/L    ALT 27 10 - 44 U/L    eGFR 26 (A) >60 mL/min/1.73 m^2    Anion Gap 13 8 - 16 mmol/L     *Note: Due to a large number of results and/or encounters for the requested time period, some results have not been displayed. A complete set of results can be found in Results Review.         Imaging Results:  Imaging Results              CT Head Without Contrast (Final result)  Result time 12/16/24 17:55:11      Final result by Cosmo Godoy MD (12/16/24 17:55:11)                   Impression:      No acute abnormality.    Atrophy and chronic white matter changes.    All CT scans   are performed using dose optimization techniques including the following: automated exposure control; adjustment of the mA and/or kV; use of iterative reconstruction technique.  Dose modulation was employed for ALARA by means of: Automated exposure control; adjustment of the mA and/or kV according to patient size (this includes techniques or standardized protocols for targeted exams where dose is matched to indication/reason for exam; i.e. extremities or head); and/or use of iterative reconstructive technique.      Electronically signed by: Cosmo Godoy  Date:    12/16/2024  Time:    17:55               Narrative:    EXAMINATION:  CT HEAD WITHOUT CONTRAST    CLINICAL HISTORY:  Head trauma, moderate-severe;Patient on Eliquis and Plavix, ground level fall;    TECHNIQUE:  Low dose axial CT images obtained throughout the head without  intravenous contrast. Sagittal and coronal reconstructions were performed.    COMPARISON:  None.    FINDINGS:  Atrophy and chronic white matter changes.    Ventricles and sulci are normal in size for age without evidence of hydrocephalus. No extra-axial blood or fluid collections.    No parenchymal mass, hemorrhage, edema or major vascular distribution infarct.    Skull/extracranial contents (limited evaluation): No fracture. Mastoid air cells and paranasal sinuses are essentially clear.                                       CT Cervical Spine Without Contrast (Final result)  Result time 12/16/24 17:56:48      Final result by Cosmo Godoy MD (12/16/24 17:56:48)                   Impression:      No acute fracture or dislocation.    Mild moderate multilevel degenerative disc disease    All CT scans   are performed using dose optimization techniques including the following: automated exposure control; adjustment of the mA and/or kV; use of iterative reconstruction technique.  Dose modulation was employed for ALARA by means of: Automated exposure control; adjustment of the mA and/or kV according to patient size (this includes techniques or standardized protocols for targeted exams where dose is matched to indication/reason for exam; i.e. extremities or head); and/or use of iterative reconstructive technique.      Electronically signed by: Cosmo Godoy  Date:    12/16/2024  Time:    17:56               Narrative:    EXAMINATION:  CT CERVICAL SPINE WITHOUT CONTRAST    CLINICAL HISTORY:  Neck trauma (Age >= 65y);    TECHNIQUE:  Low dose axial images, sagittal and coronal reformations were performed though the cervical spine.  Contrast was not administered.    COMPARISON:  None    FINDINGS:  Normal vertebral body heights without evidence for spondylolisthesis.  Mild moderate multilevel degenerative disc disease..  No prevertebral soft tissue swelling.  Facet joints are congruent.  Normal bone mineral density.                                        CT Maxillofacial Without Contrast (Final result)  Result time 12/16/24 18:23:12      Final result by Cosmo Godoy MD (12/16/24 18:23:12)                   Impression:      As above      Electronically signed by: Cosmo Godoy  Date:    12/16/2024  Time:    18:23               Narrative:    EXAMINATION:  CT MAXILLOFACIAL WITHOUT CONTRAST    CLINICAL HISTORY:  Facial trauma, blunt;Left-sided jaw maximal TTP;    TECHNIQUE:  Low dose axial images, sagittal and coronal reformations were obtained through the face.  Contrast was not administered.    COMPARISON:  None    FINDINGS:  No acute fracture or dislocation.  Visualized paranasal sinuses are unremarkable.  Mastoid air cells are clear.  No significant nasal bone fracture.  Zygomatic arch is intact.  No orbital floor fracture.  Slightly hyper dense osseous structures may be within anatomical variation.  However consider nuclear medicine bone scan for further evaluation for metabolic bone disease.  In regards to the reported history of left-sided TMJ pain, there is mild degenerative joint disease of the left TMJ.  No acute displaced osseous abnormality is otherwise identified.  A focus of gas is identified in the mandibular apex.  Is is is of uncertain clinical origin.                                       X-Ray Hand 3 View Left (Final result)  Result time 12/16/24 17:43:57      Final result by Nash Bishop MD (12/16/24 17:43:57)                   Impression:      As above.      Electronically signed by: Nash Bishop  Date:    12/16/2024  Time:    17:43               Narrative:    EXAMINATION:  XR HAND COMPLETE 3 VIEW LEFT    CLINICAL HISTORY:  fall;.    TECHNIQUE:  PA, lateral, and oblique views of the left hand were performed.    COMPARISON:  None    FINDINGS:  No fracture.  No traumatic malalignment.  No osseous destructive process.  Soft tissue injury.  Advanced degenerative change of the base of thumb.  Lesser degenerative  changes elsewhere.                                       X-Ray Wrist Complete Left (Final result)  Result time 12/16/24 17:46:52      Final result by Nash Bishop MD (12/16/24 17:46:52)                   Impression:      As above.      Electronically signed by: Nash Bishop  Date:    12/16/2024  Time:    17:46               Narrative:    EXAMINATION:  XR WRIST COMPLETE 3 VIEWS LEFT    CLINICAL HISTORY:  Unspecified fall, initial encounter    TECHNIQUE:  PA, lateral, and oblique views of the left wrist were performed.    COMPARISON:  None    FINDINGS:  No acute displaced fracture identified.  No traumatic malalignment.  Soft tissue injury.  Advanced degenerative change of the base of thumb.  Lesser degenerative changes elsewhere.  If high clinical concern remains consider additional evaluation.                                       X-Ray Elbow Complete Left (Final result)  Result time 12/16/24 17:51:57      Final result by Cosmo Godoy MD (12/16/24 17:51:57)                   Impression:      No acute process..      Electronically signed by: Cosmo Godoy  Date:    12/16/2024  Time:    17:51               Narrative:    EXAMINATION:  XR ELBOW COMPLETE 3 VIEW LEFT    CLINICAL HISTORY:  Unspecified fall, initial encounter    TECHNIQUE:  AP, lateral, and oblique views of the left elbow were performed.    COMPARISON:  None    FINDINGS:  No acute fracture or dislocation.  No significant fat pad or joint effusion.  Normal bone mineral density.  No soft tissue abnormality                                       X-Ray Forearm Left (Final result)  Result time 12/16/24 17:53:41      Final result by Nash Bishop MD (12/16/24 17:53:41)                   Impression:      As above.      Electronically signed by: Nash Bishop  Date:    12/16/2024  Time:    17:53               Narrative:    EXAMINATION:  XR FOREARM LEFT    CLINICAL HISTORY:  Unspecified fall, initial encounter    TECHNIQUE:  AP and lateral views of the  left forearm were performed.    COMPARISON:  None    FINDINGS:  Soft tissue injury without acute osseous abnormality.  Suspect extracorporeal material near the elbow on 2nd image.                                              The Emergency Provider reviewed the vital signs and test results, which are outlined above.     ED Discussion       7:40 PM: Reassessed pt at this time. Pt tolerated the procedure well with no immediate complications. Will follow up with her PCP in 7-10 days to remove sutures. Pt also instructed to apply Neosporin to wounds 3x a day. Discussed with pt all pertinent ED information and results. Discussed pt dx and plan of tx. Gave pt all f/u and return to the ED instructions. All questions and concerns were addressed at this time. Pt expresses understanding of information and instructions, and is comfortable with plan to discharge. Pt is stable for discharge.    I discussed with patient and/or family/caretaker that evaluation in the ED does not suggest any emergent or life threatening medical conditions requiring immediate intervention beyond what was provided in the ED, and I believe patient is safe for discharge.  Regardless, an unremarkable evaluation in the ED does not preclude the development or presence of a serious of life threatening condition. As such, patient was instructed to return immediately for any worsening or change in current symptoms.      ED Course as of 12/20/24 0000   Mon Dec 16, 2024   1938 Comprehensive metabolic panel(!)  Nonspecific findings [CD]   1938 CBC auto differential(!)  Nonspecific finding [CD]   1939 CT Maxillofacial Without Contrast  No acute fracture or dislocation.  Visualized paranasal sinuses are unremarkable.  Mastoid air cells are clear.  No significant nasal bone fracture.  Zygomatic arch is intact.  No orbital floor fracture.  Slightly hyper dense osseous structures may be within anatomical variation.  However consider nuclear medicine bone scan for  further evaluation for metabolic bone disease.  In regards to the reported history of left-sided TMJ pain, there is mild degenerative joint disease of the left TMJ.  No acute displaced osseous abnormality is otherwise identified.  A focus of gas is identified in the mandibular apex.  Is is is of uncertain clinical origin. [CD]   1939 CT Cervical Spine Without Contrast  No acute fracture or dislocation.     Mild moderate multilevel degenerative disc disease   [CD]   1939 CT Head Without Contrast  No acute abnormality.     Atrophy and chronic white matter changes.   [CD]   1940 X-Ray Forearm Left  Soft tissue injury without acute osseous abnormality.  Suspect extracorporeal material near the elbow on 2nd image. [CD]   1940 X-Ray Elbow Complete Left  No acute process.. [CD]   1940 X-Ray Wrist Complete Left  No acute displaced fracture identified.  No traumatic malalignment.  Soft tissue injury.  Advanced degenerative change of the base of thumb.  Lesser degenerative changes elsewhere.  If high clinical concern remains consider additional evaluation.      [CD]   1941 X-Ray Hand 3 View Left  No fracture.  No traumatic malalignment.  No osseous destructive process.  Soft tissue injury.  Advanced degenerative change of the base of thumb.  Lesser degenerative changes elsewhere. [CD]      ED Course User Index  [CD] Dustin Marcum, DO     Medical Decision Making  Laceration in the chin was sutured, all other skin tears have no way that I will be able to suture the skin together.  Patient instructed to apply triple antibiotic ointment to the area 2-3 times per day and to watch for signs of infection which were reviewed.  Able to tolerate fluids and ambulate at baseline.  Instructed to return immediately for any new or worsening symptoms and she verbalized understanding.    Amount and/or Complexity of Data Reviewed  Labs: ordered. Decision-making details documented in ED Course.  Radiology: ordered. Decision-making  details documented in ED Course.    Risk  OTC drugs.  Prescription drug management.  Risk Details: Differential diagnosis includes but is not limited to:  Fracture, dislocation, sprain, strain, contusion, intracerebral hemorrhage                ED Medication(s):  Medications   Tdap vaccine injection 0.5 mL (0.5 mLs Intramuscular Given 12/16/24 1653)   neomycin-bacitracin-polymyxin ointment ( Topical (Top) Given 12/16/24 1821)   LIDOcaine (PF) 10 mg/ml (1%) injection 100 mg (100 mg Infiltration Given 12/16/24 1841)       Discharge Medication List as of 12/16/2024  7:44 PM        START taking these medications    Details   orphenadrine (NORFLEX) 100 mg tablet Take 1 tablet (100 mg total) by mouth 2 (two) times daily., Starting Mon 12/16/2024, Print              Follow-up Information       Schedule an appointment as soon as possible for a visit  with Marti Coronel MD.    Specialty: Family Medicine  Contact information:  [address] [place] [address] DR Sarmad WIN [id number] [phone]                                 Scribe Attestation:   Scribe #1: I performed the above scribed service and the documentation accurately describes the services I performed. I attest to the accuracy of the note.     Attending:   Physician Attestation Statement for Scribe #1: I, Dustin Marcum DO, personally performed the services described in this documentation, as scribed by Naseem Rg, in my presence, and it is both accurate and complete.           Clinical Impression       ICD-10-CM ICD-9-CM   1. Closed head injury, initial encounter  S09.90XA 959.01   2. Fall  W19.XXXA E888.9   3. Facial laceration, initial encounter  S01.81XA 873.40   4. Multiple abrasions  T07.XXXA 919.0       Disposition:   Disposition: Discharged  Condition: Stable         Dustin Marcum DO  12/20/24 0003

## 2024-12-17 ENCOUNTER — TELEPHONE (OUTPATIENT)
Dept: INTERNAL MEDICINE | Facility: CLINIC | Age: 84
End: 2024-12-17
Payer: MEDICARE

## 2024-12-17 ENCOUNTER — PATIENT OUTREACH (OUTPATIENT)
Dept: EMERGENCY MEDICINE | Facility: HOSPITAL | Age: 84
End: 2024-12-17
Payer: MEDICARE

## 2024-12-17 NOTE — TELEPHONE ENCOUNTER
----- Message from Reba Perez sent at 12/17/2024  3:08 PM CST -----  Regarding: Post ED visit follow up appt within 7 days of d/c date 12/16/24  Good afternoon,     Pt was seen in the ED on 12/16/24 for Fall; Closed head injury, initial encounter; Facial laceration, initial encounter; Multiple abrasions. I spoke with patient for a follow up to ED visit. Pt states that she feels like a she was hit by a truck and has stitches under her chin. Pt would like to schedule a follow up appt. I am requesting scheduling assistance, as I am unable to schedule her appt. Please contact pt for scheduling a follow up appt within the next 7 days.     Thank you for your assistance,   Reba Perez

## 2024-12-17 NOTE — PROGRESS NOTES
Reba Perez  ED Navigator  Emergency Department    Project: [facility] ED Navigator  Role: Community Health Worker    Date: 12/17/2024  Patient Name: Violet Swenson  MRN: [id number]  PCP: Marti Coronel MD    Assessment:     Violet Swenson is a 84 y.o. female who has presented to ED for Fall; Closed head injury, initial encounter; Facial laceration, initial encounter; Multiple abrasions. Patient has visited the ED 1 times in the past 3 months. Patient did not contact PCP.     ED Navigator Initial Assessment    ED Navigator Enrollment Documentation  Consent to Services  Does patient consent to completing the assessment?: Yes  Contact  Method of Initial Contact: Phone  Transportation  Does the patient have issues with Transportation?: No  Does the patient have transportation to and from healthcare appointments?: Yes  Insurance Coverage  Do you have coverage/adequate coverage?: Yes  Type/kind of coverage: [employer]  Is patient able to afford co-pays/deductibles?: Yes  Is patient able to afford HME or supplies?: Yes  Does patient have an established [facility] PCP?: Yes  Able to access?: Yes  Does the patient have a lack of adequate coverage?: No  Specialist Appointment  Did the patient come to the ED to see a specialist?: No  Does the patient have a pending specialist referral?: No  Does the patient have a specialist appointment made?: No  PCP Follow Up Appointment  Has the patient had an appointment with a primary care provider in the past year?: Yes  Approximate date: 7/25/24  Provider: Marti Coronel MD  Does the patient have a follow up appontment with a PCP?: No  When was the last time you saw your PCP?: 7/25/24  Why does the patient not have a follow up scheduled?: Other (see comments)  Medications  Is patient able to afford medication?: Yes  Is patient unable to get medication due to lack of transportation?: No  Psychological  Does the patient have psycho-social concerns?: Yes  What concerns does  the patient have?: Anxiety and/or Depression (Comment: Hx of anxiety/depression)  Food  Does the patient have concerns about food?: No  Communication/Education  Does the patient have limited English proficiency/English not primary language?: No  Does patient have low literacy and/or low health literacy?: Yes  Does patient have concerns with care?: No  Does patient have dissatisfaction with care?: No  Other Financial Concerns  Does the patient have immediate financial distress?: No  Does the patient have general financial concerns?: No  Other Social Barriers/Concerns  Does the patient have any additional barriers or concerns?: Other (see comments) (Comment: Chronic Conditions)  Primary Barrier  Barriers identified: Cognitive barrier (health literacy, language and communication, etc.), Psychological barrier (mistrust, anxiety, etc.)  Root Cause of ED Utilization: Chronic Conditions  Plan to address Chronic Conditions: Schedule appointment for patient with their PCP/specialist per ED discharge instructions  Next steps: Provided Education  Was education/educational materials provided surrounding PCP services/creating a medical home?: Yes Was education verbal or written?: Written     Was education/educational materials provided surrounding low cost, healthy foods?: Yes Was education verbal or written?: Written     Was education/educational materials provided surrounding other items? If so, use comment to explain.: Yes Was education verbal or written?: Written   Additional Documentation: Pt was seen in the ED on 12/16/24 for Fall; Closed head injury, initial encounter; Facial laceration, initial encounter; Multiple abrasions.ED Navigator spoke with pt for Post ED visit follow up navigation to assist with scheduling a 7-day Post ED visit follow up appt. Pt states that she had not yet contacted pcp to schedule and accepted scheduling assistance. I was unable to schedule appt for pt and sent a staff request for assistance to  pcp.  Pt will be contacted directly for scheduling. Pt does not have transportation issues and has no additional needs at this time. Closing Encounter.     Reba Perez            Social History     Socioeconomic History    Marital status:     Number of children: 4   Occupational History    Occupation: Retired   Tobacco Use    Smoking status: Former     Current packs/day: 0.00     Average packs/day: 2.0 packs/day for 6.0 years (12.0 ttl pk-yrs)     Types: Cigarettes     Start date: 3/15/1970     Quit date: 3/15/1976     Years since quittin.7    Smokeless tobacco: Never   Substance and Sexual Activity    Alcohol use: No     Alcohol/week: 0.0 standard drinks of alcohol    Drug use: No    Sexual activity: Not Currently     Partners: Male   Social History Narrative    , lives with . She is a retired . 4 children (3 natural born, 1 adopted)- 2 ; 2x  (currently 17 years); son has prostate cancer, daughter COPD,  cardiac difficulty. She still drives. Does not have a Living will or Advanced Directive.     Social Drivers of Health     Financial Resource Strain: Low Risk  (2021)    Overall Financial Resource Strain (CARDIA)     Difficulty of Paying Living Expenses: Not hard at all   Food Insecurity: No Food Insecurity (2021)    Hunger Vital Sign     Worried About Running Out of Food in the Last Year: Never true     Ran Out of Food in the Last Year: Never true   Transportation Needs: No Transportation Needs (2021)    PRAPARE - Transportation     Lack of Transportation (Medical): No     Lack of Transportation (Non-Medical): No   Physical Activity: Inactive (2021)    Exercise Vital Sign     Days of Exercise per Week: 0 days     Minutes of Exercise per Session: 0 min   Stress: Stress Concern Present (2021)    [employer] [place] [employer] - Occupational Stress Questionnaire     Feeling of Stress : To some extent   Housing Stability:  Low Risk  (11/2/2021)    Housing Stability Vital Sign     Unable to Pay for Housing in the Last Year: No     Number of Places Lived in the Last Year: 1     Unstable Housing in the Last Year: No       Plan:   Pt was seen in the ED on 12/16/24 for Fall; Closed head injury, initial encounter; Facial laceration, initial encounter; Multiple abrasions.ED Navigator spoke with pt for Post ED visit follow up navigation to assist with scheduling a 7-day Post ED visit follow up appt. Pt states that she had not yet contacted pcp to schedule and accepted scheduling assistance. I was unable to schedule appt for pt and sent a staff request for assistance to pcp.  Pt will be contacted directly for scheduling. Pt was scheduled on 12/26/24 w/pcp. Pt does not have transportation issues and has no additional needs at this time. Closing Encounter.     Reba Perez    Appointment made with: Marti Coronel MD

## 2024-12-17 NOTE — DISCHARGE INSTRUCTIONS
1.)  Have sutures evaluated for removal in the next 7-10 days.    2.)  Keep all open wounds clean and dry.  Apply triple antibiotic ointment to the area 3 times a day.    3.)  Return immediately for any new or worsening symptoms.

## 2024-12-26 ENCOUNTER — OFFICE VISIT (OUTPATIENT)
Dept: INTERNAL MEDICINE | Facility: CLINIC | Age: 84
End: 2024-12-26
Payer: MEDICARE

## 2024-12-26 VITALS
DIASTOLIC BLOOD PRESSURE: 63 MMHG | BODY MASS INDEX: 21.24 KG/M2 | SYSTOLIC BLOOD PRESSURE: 140 MMHG | WEIGHT: 124.44 LBS | HEART RATE: 75 BPM | HEIGHT: 64 IN | OXYGEN SATURATION: 96 %
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
## 2024-12-26 DIAGNOSIS — Z48.02 VISIT FOR SUTURE REMOVAL: ICD-10-CM

## 2024-12-26 DIAGNOSIS — S61.412A SKIN TEAR OF LEFT HAND WITHOUT COMPLICATION, INITIAL ENCOUNTER: ICD-10-CM

## 2024-12-26 PROCEDURE — 99999 PR PBB SHADOW E&M-EST. PATIENT-LVL V: CPT | Mod: PBBFAC,HCNC,, | Performed by: PHYSICIAN ASSISTANT

## 2024-12-26 RX ORDER — CEPHALEXIN 500 MG/1
500 CAPSULE ORAL EVERY 8 HOURS
Qty: 21 CAPSULE | Refills: 0 | Status: SHIPPED | OUTPATIENT
Start: 2024-12-26 | End: 2025-01-02

## 2024-12-26 RX ORDER — MUPIROCIN 20 MG/G
OINTMENT TOPICAL 2 TIMES DAILY
Qty: 30 G | Refills: 0 | Status: SHIPPED | OUTPATIENT
Start: 2024-12-26 | End: 2025-01-05

## 2024-12-26 RX ORDER — TRAMADOL HYDROCHLORIDE 50 MG/1
50 TABLET ORAL EVERY 12 HOURS PRN
Qty: 14 TABLET | Refills: 0 | Status: SHIPPED | OUTPATIENT
Start: 2024-12-26 | End: 2025-01-02

## 2024-12-26 NOTE — PROGRESS NOTES
Subjective:       Patient ID: Violet Swenson is a 84 y.o. female.    Chief Complaint: Follow-up (Hosp f/u )    CHIEF COMPLAINT:  - 84-year-old female presents for ER follow-up after a fall at a store resulting in multiple injuries including contusions, lacerations, and a possible concussion.    HPI:  - Violet, an 84-year-old female, presents for follow-up after an emergency room visit on 12/16/2024. She tripped over a carpet at [employer], resulting in multiple injuries: contusions to her left hand with a skin tear, right hand, and right elbow; a laceration to her chin; an abrasion to her left knee; and a possible concussion. In the emergency department, she received 4 sutures on her chin to repair the laceration. She reports constant, severe pain in her hands, regardless of positioning. The pain intensity is significant enough that the patient considered purchasing pain medication illicitly.  - The ER doctor prescribed Norflex (a muscle relaxer) and advised Tylenol for pain management, but she reports ineffective pain control with Tylenol. Her grandson-in-law, an EMT, has been helping her change the hand bandage daily, except for the day before the current visit due to work commitments.  - Violet notes that while the chin laceration and other injuries are scabbing over, her legs are extensively bruised, which she attributes to her use of Apixaban. She did not sleep the night before the visit, possibly due to pain or discomfort from her injuries. Her blood pressure has been elevated since the incident, which she believes is likely due to the constant pain.  - Violet denies any dizziness or headaches since the fall.    MEDICATIONS:  - Apixaban (blood thinner)    MEDICAL HISTORY:  - Hypertension  - Hyperlipidemia  - Hypothyroidism  - CAD  - Congestive heart failure (CHF)  - Atrial fibrillation (AFib)  - Anemia  - Osteoporosis  - Stroke: History of  - Gout: History of  - Tetanus shot received in 2020  -  Tetanus shot received in the emergency room on 12/16/2024    SURGICAL HISTORY:  - Pacemaker  - Bilateral knee replacement: Years ago    TEST RESULTS:  - GFR: 26, appears to be baseline for this patient  - Creatinine: 1.9, appears to be baseline  - BUN: 39, appears to be baseline  - Hemoglobin: 12.1  - White blood cell count: normal  - Platelets: 208, normal    IMAGING:  - CT Head: 12/16/2024, no acute abnormality, no fractures, no extra axial blood or fluid collections around the brain  - CT C-spine: 12/16/2024, no acute findings  - CT Maxillofacial: 12/16/2024, no fracture or dislocations  - X-ray Left Hand: 12/16/2024, no fractures, just soft tissue injury  - X-ray Left Wrist: 12/16/2024, no fractures  - X-ray Left Elbow: 12/16/2024, no fractures or dislocations  - X-ray Left Forearm: 12/16/2024, no fractures, just soft tissue injury    ALLERGIES:  - Oxycodone: hypotension           Laboratory Reviewed ({Yes), labs from the ED reviewed.     Lab Results   Component Value Date     12/16/2024    K 4.7 12/16/2024     (H) 12/16/2024    CO2 16 (L) 12/16/2024    GLU 92 12/16/2024    BUN 39 (H) 12/16/2024    CREATININE 1.9 (H) 12/16/2024    CALCIUM 9.4 12/16/2024    PROT 7.1 12/16/2024    ALBUMIN 4.0 12/16/2024    BILITOT 0.4 12/16/2024    ALKPHOS 45 12/16/2024    AST 30 12/16/2024    ALT 27 12/16/2024    EGFRNORACEVR 26 (A) 12/16/2024    ANIONGAP 13 12/16/2024       Lab Results   Component Value Date    CHOL 116 (L) 06/19/2024    TRIG 68 06/19/2024    HDL 62 06/19/2024    LDLCALC 40.4 (L) 06/19/2024       Lab Results   Component Value Date    WBC 7.68 12/16/2024    RBC 3.55 (L) 12/16/2024    HGB 12.1 12/16/2024    HCT 37.6 12/16/2024     (H) 12/16/2024     12/16/2024    GRAN 4.2 12/16/2024    GRAN 54.2 12/16/2024    LYMPH 2.9 12/16/2024    LYMPH 37.9 12/16/2024    MONO 0.5 12/16/2024    MONO 6.3 12/16/2024    EOSINOPHIL 0.9 12/16/2024    BASOPHIL 0.3 12/16/2024       Lab Results   Component  Value Date    TSH 5.875 (H) 06/19/2024    FREET4 0.75 06/19/2024       Lab Results   Component Value Date    HGBA1C 4.9 11/16/2020       Lab Results   Component Value Date    CREATRANDUR 209.0 09/01/2020       Lab Results   Component Value Date    DUNJHFQS43GT 25 (L) 06/19/2024         Review of Systems   Constitutional:  Negative for chills, fatigue, fever and unexpected weight change.   HENT:  Negative for congestion, dental problem, ear pain, hearing loss, rhinorrhea and trouble swallowing.    Eyes:  Negative for pain and visual disturbance.   Respiratory:  Negative for cough and shortness of breath.    Cardiovascular:  Negative for chest pain, palpitations and leg swelling.   Gastrointestinal:  Negative for abdominal distention, abdominal pain, blood in stool, constipation, diarrhea, nausea and vomiting.   Genitourinary:  Negative for difficulty urinating and pelvic pain.   Musculoskeletal:  Negative for arthralgias and myalgias.   Skin:  Positive for wound. Negative for rash.        Multiple abrasions and bruises to BL UE and LE. Left hand skin tear.   Neurological:  Negative for dizziness, weakness, numbness and headaches.   Hematological:  Negative for adenopathy. Does not bruise/bleed easily.   Psychiatric/Behavioral:  Negative for dysphoric mood and sleep disturbance. The patient is not nervous/anxious.        Objective:      Physical Exam  Vitals reviewed.   Constitutional:       General: She is not in acute distress.     Appearance: Normal appearance. She is well-developed and normal weight. She is not ill-appearing or toxic-appearing.   HENT:      Head: Normocephalic and atraumatic.   Eyes:      General: Lids are normal. No scleral icterus.     Extraocular Movements: Extraocular movements intact.      Conjunctiva/sclera: Conjunctivae normal.      Pupils: Pupils are equal, round, and reactive to light.   Cardiovascular:      Rate and Rhythm: Normal rate and regular rhythm.      Heart sounds: Normal heart  sounds. No murmur heard.     No friction rub. No gallop.   Pulmonary:      Effort: Pulmonary effort is normal.      Breath sounds: Normal breath sounds. No decreased breath sounds, wheezing, rhonchi or rales.   Skin:     Comments: Multiple abrasions and bruises to BL UE and LE. Left hand skin tear wrapped in gauze with serosang drainage noted.   Dorsum of left hand large skin tear, some scabbing on edges, no eschar, lots of serosang drainage and appears to have small amount of possible purulent drainage. Wrapped in gauze, re-dressed in clinic.    Neurological:      General: No focal deficit present.      Mental Status: She is alert and oriented to person, place, and time. Mental status is at baseline.      Cranial Nerves: No cranial nerve deficit.      Gait: Gait normal.   Psychiatric:         Mood and Affect: Mood and affect normal.         Behavior: Behavior normal.         Thought Content: Thought content normal.         Judgment: Judgment normal.         Assessment:       1. Fall, initial encounter    2. Skin tear of left hand without complication, initial encounter    3. Visit for suture removal        Plan:   1. Fall, initial encounter  -     traMADoL (ULTRAM) 50 mg tablet; Take 1 tablet (50 mg total) by mouth every 12 (twelve) hours as needed for Pain.  Dispense: 14 tablet; Refill: 0    2. Skin tear of left hand without complication, initial encounter  -     traMADoL (ULTRAM) 50 mg tablet; Take 1 tablet (50 mg total) by mouth every 12 (twelve) hours as needed for Pain.  Dispense: 14 tablet; Refill: 0  -     mupirocin (BACTROBAN) 2 % ointment; Apply topically 2 (two) times daily. for 10 days  Dispense: 30 g; Refill: 0  -     cephALEXin (KEFLEX) 500 MG capsule; Take 1 capsule (500 mg total) by mouth every 8 (eight) hours. for 7 days  Dispense: 21 capsule; Refill: 0    3. Visit for suture removal  Comments:  4 sutures placed to chin by ED          Assessment & Plan    IMPRESSION:  - Assessed injuries from recent  fall, including contusions, lacerations, and abrasions  - Reviewed ED imaging studies showing no acute abnormalities or fractures  - Evaluated chin laceration repair and determined sutures ready for removal at 10 days post-procedure  - Noted elevated blood pressure, likely due to pain  - Considered pain management options in light of patient's kidney function (creatinine clearance 20) and previous oxycodone intolerance  - Will prescribe Tramadol for pain relief, adjusting dosage for renal impairment  - Recommend continuing OTC Tylenol for additional pain management  - Assessed hand wound and determined antibiotic treatment necessary due to concerning tissue color  - Chose Keflex as appropriate antibiotic therapy to prevent infection    INJURIES FROM FALL:   Removed 4 sutures from chin laceration.   Performed wound care and redressing of hand injury.    PAIN MANAGEMENT:   Started Tramadol 1 tablet every 12 hours for pain relief.   Continued Tylenol (acetaminophen) 2 extra strength tablets every 8 hours as needed for pain.   Discussed potential side effects of Tramadol, including drowsiness and impaired driving ability.   Violet to avoid driving while taking Tramadol until effects are known.    ANTIBIOTIC TREATMENT:   Started mupirocin (antibiotic ointment) to apply twice daily for 7-10 days on open wounds.   Started Keflex (antibiotic) every 8 hours for 7 days, to be taken with food.   Explained importance of taking antibiotics with food and consuming yogurt to prevent stomach upset.   Violet to eat yogurt daily while on antibiotic therapy.    ATRIAL FIBRILLATION AND ANTICOAGULATION:   Explained bruising and bleeding are expected due to Apixaban (blood thinner) use.    FOLLOW-UP CARE:   Follow up in 1 week for wound healing check.           Health Maintenance reviewed/updated.          This note was generated with the assistance of ambient listening technology. Verbal consent was obtained by the patient and  accompanying visitor(s) for the recording of patient appointment to facilitate this note. I attest to having reviewed and edited the generated note for accuracy, though some syntax or spelling errors may persist. Please contact the author of this note for any clarification.

## 2025-01-06 RX ORDER — CALCITRIOL 0.25 UG/1
CAPSULE ORAL
Qty: 12 CAPSULE | Refills: 3 | Status: SHIPPED | OUTPATIENT
Start: 2025-01-06

## 2025-01-25 ENCOUNTER — LAB VISIT (OUTPATIENT)
Dept: LAB | Facility: HOSPITAL | Age: 85
End: 2025-01-25
Attending: FAMILY MEDICINE
Payer: MEDICARE

## 2025-01-25 DIAGNOSIS — E78.2 MIXED HYPERLIPIDEMIA: ICD-10-CM

## 2025-01-25 DIAGNOSIS — E55.9 VITAMIN D DEFICIENCY: ICD-10-CM

## 2025-01-25 DIAGNOSIS — E03.9 HYPOTHYROIDISM (ACQUIRED): ICD-10-CM

## 2025-01-25 LAB
25(OH)D3+25(OH)D2 SERPL-MCNC: 22 NG/ML (ref 30–96)
ALBUMIN SERPL BCP-MCNC: 3.5 G/DL (ref 3.5–5.2)
ALP SERPL-CCNC: 46 U/L (ref 40–150)
ALT SERPL W/O P-5'-P-CCNC: 26 U/L (ref 10–44)
ANION GAP SERPL CALC-SCNC: 11 MMOL/L (ref 8–16)
AST SERPL-CCNC: 32 U/L (ref 10–40)
BILIRUB SERPL-MCNC: 0.4 MG/DL (ref 0.1–1)
BUN SERPL-MCNC: 21 MG/DL (ref 8–23)
CALCIUM SERPL-MCNC: 8.3 MG/DL (ref 8.7–10.5)
CHLORIDE SERPL-SCNC: 113 MMOL/L (ref 95–110)
CHOLEST SERPL-MCNC: 125 MG/DL (ref 120–199)
CHOLEST/HDLC SERPL: 2.2 {RATIO} (ref 2–5)
CO2 SERPL-SCNC: 17 MMOL/L (ref 23–29)
CREAT SERPL-MCNC: 1.4 MG/DL (ref 0.5–1.4)
EST. GFR  (NO RACE VARIABLE): 37.3 ML/MIN/1.73 M^2
GLUCOSE SERPL-MCNC: 91 MG/DL (ref 70–110)
HDLC SERPL-MCNC: 56 MG/DL (ref 40–75)
HDLC SERPL: 44.8 % (ref 20–50)
LDLC SERPL CALC-MCNC: 47.6 MG/DL (ref 63–159)
NONHDLC SERPL-MCNC: 69 MG/DL
POTASSIUM SERPL-SCNC: 4.4 MMOL/L (ref 3.5–5.1)
PROT SERPL-MCNC: 6.7 G/DL (ref 6–8.4)
SODIUM SERPL-SCNC: 141 MMOL/L (ref 136–145)
T4 FREE SERPL-MCNC: 0.78 NG/DL (ref 0.71–1.51)
TRIGL SERPL-MCNC: 107 MG/DL (ref 30–150)
TSH SERPL DL<=0.005 MIU/L-ACNC: 6.63 UIU/ML (ref 0.4–4)

## 2025-01-25 PROCEDURE — 84443 ASSAY THYROID STIM HORMONE: CPT | Mod: HCNC | Performed by: FAMILY MEDICINE

## 2025-01-25 PROCEDURE — 80053 COMPREHEN METABOLIC PANEL: CPT | Mod: HCNC | Performed by: FAMILY MEDICINE

## 2025-01-25 PROCEDURE — 82306 VITAMIN D 25 HYDROXY: CPT | Mod: HCNC | Performed by: FAMILY MEDICINE

## 2025-01-25 PROCEDURE — 36415 COLL VENOUS BLD VENIPUNCTURE: CPT | Mod: HCNC | Performed by: FAMILY MEDICINE

## 2025-01-25 PROCEDURE — 80061 LIPID PANEL: CPT | Mod: HCNC | Performed by: FAMILY MEDICINE

## 2025-01-25 PROCEDURE — 84439 ASSAY OF FREE THYROXINE: CPT | Mod: HCNC | Performed by: FAMILY MEDICINE

## 2025-01-30 ENCOUNTER — OFFICE VISIT (OUTPATIENT)
Dept: INTERNAL MEDICINE | Facility: CLINIC | Age: 85
End: 2025-01-30
Payer: MEDICARE

## 2025-01-30 VITALS
OXYGEN SATURATION: 94 % | TEMPERATURE: 97 F | DIASTOLIC BLOOD PRESSURE: 82 MMHG | HEIGHT: 64 IN | BODY MASS INDEX: 22.2 KG/M2 | HEART RATE: 82 BPM | SYSTOLIC BLOOD PRESSURE: 126 MMHG | WEIGHT: 130.06 LBS | RESPIRATION RATE: 16 BRPM

## 2025-01-30 DIAGNOSIS — M05.79 RHEUMATOID ARTHRITIS INVOLVING MULTIPLE SITES WITH POSITIVE RHEUMATOID FACTOR: ICD-10-CM

## 2025-01-30 DIAGNOSIS — I10 ESSENTIAL HYPERTENSION: Primary | ICD-10-CM

## 2025-01-30 DIAGNOSIS — C83.30 DIFFUSE LARGE B-CELL LYMPHOMA, UNSPECIFIED BODY REGION: ICD-10-CM

## 2025-01-30 DIAGNOSIS — I48.91 ATRIAL FIBRILLATION, UNSPECIFIED TYPE: ICD-10-CM

## 2025-01-30 DIAGNOSIS — E78.2 MIXED HYPERLIPIDEMIA: ICD-10-CM

## 2025-01-30 DIAGNOSIS — F32.1 MAJOR DEPRESSIVE DISORDER, SINGLE EPISODE, MODERATE: ICD-10-CM

## 2025-01-30 DIAGNOSIS — I50.32 CHRONIC DIASTOLIC HEART FAILURE: ICD-10-CM

## 2025-01-30 DIAGNOSIS — E03.9 HYPOTHYROIDISM (ACQUIRED): ICD-10-CM

## 2025-01-30 DIAGNOSIS — Z23 NEED FOR VACCINATION: ICD-10-CM

## 2025-01-30 DIAGNOSIS — Z00.00 ENCOUNTER FOR MEDICARE ANNUAL WELLNESS EXAM: ICD-10-CM

## 2025-01-30 DIAGNOSIS — E55.9 VITAMIN D DEFICIENCY: ICD-10-CM

## 2025-01-30 DIAGNOSIS — N18.4 STAGE 4 CHRONIC KIDNEY DISEASE: ICD-10-CM

## 2025-01-30 PROCEDURE — 99214 OFFICE O/P EST MOD 30 MIN: CPT | Mod: HCNC,S$GLB,, | Performed by: FAMILY MEDICINE

## 2025-01-30 PROCEDURE — 3079F DIAST BP 80-89 MM HG: CPT | Mod: HCNC,CPTII,S$GLB, | Performed by: FAMILY MEDICINE

## 2025-01-30 PROCEDURE — G0008 ADMIN INFLUENZA VIRUS VAC: HCPCS | Mod: HCNC,S$GLB,, | Performed by: FAMILY MEDICINE

## 2025-01-30 PROCEDURE — 1157F ADVNC CARE PLAN IN RCRD: CPT | Mod: HCNC,CPTII,S$GLB, | Performed by: FAMILY MEDICINE

## 2025-01-30 PROCEDURE — 99999 PR PBB SHADOW E&M-EST. PATIENT-LVL V: CPT | Mod: PBBFAC,HCNC,, | Performed by: FAMILY MEDICINE

## 2025-01-30 PROCEDURE — 90653 IIV ADJUVANT VACCINE IM: CPT | Mod: HCNC,S$GLB,, | Performed by: FAMILY MEDICINE

## 2025-01-30 PROCEDURE — G2211 COMPLEX E/M VISIT ADD ON: HCPCS | Mod: HCNC,S$GLB,, | Performed by: FAMILY MEDICINE

## 2025-01-30 PROCEDURE — 3074F SYST BP LT 130 MM HG: CPT | Mod: HCNC,CPTII,S$GLB, | Performed by: FAMILY MEDICINE

## 2025-01-30 PROCEDURE — 1159F MED LIST DOCD IN RCRD: CPT | Mod: HCNC,CPTII,S$GLB, | Performed by: FAMILY MEDICINE

## 2025-01-30 PROCEDURE — 1125F AMNT PAIN NOTED PAIN PRSNT: CPT | Mod: HCNC,CPTII,S$GLB, | Performed by: FAMILY MEDICINE

## 2025-01-30 PROCEDURE — 1100F PTFALLS ASSESS-DOCD GE2>/YR: CPT | Mod: HCNC,CPTII,S$GLB, | Performed by: FAMILY MEDICINE

## 2025-01-30 PROCEDURE — 3288F FALL RISK ASSESSMENT DOCD: CPT | Mod: HCNC,CPTII,S$GLB, | Performed by: FAMILY MEDICINE

## 2025-01-30 RX ORDER — LEVOTHYROXINE SODIUM 112 UG/1
112 TABLET ORAL DAILY
Qty: 90 TABLET | Refills: 3 | Status: SHIPPED | OUTPATIENT
Start: 2025-01-30 | End: 2026-01-30

## 2025-01-30 NOTE — PROGRESS NOTES
"Subjective:       Patient ID: Violet Swenson is a 84 y.o. female.    Chief Complaint: Follow-up      History of Present Illness    Patient presents to clinic today for followup of chronic conditions.  - Patient reports swelling in her feet and legs for the past 2 weeks, affecting her ankles and the top of her feet, which she describes as edematous. The swelling worsens throughout the day and persists despite elevating her feet on a pillow overnight. She has been attempting to manage the swelling by elevating her feet during sleep and reducing her salt intake. Patient uses a salt substitute and has been trying to follow dietary recommendations from Dr. Gil, which include consuming raw vegetables and fruits and preparing her own meals. She also mentions ongoing arm pain, which she attributes to needing another steroid injection in her shoulder from the sports medicine department.  - Patient denies any shortness of breath associated with the swelling in her feet and legs or any changes in her baseline breathing.  Patient is otherwise without concerns today.    Review of Systems   Constitutional:  Negative for chills, fatigue, fever and unexpected weight change.   Eyes:  Negative for visual disturbance.   Respiratory:  Negative for shortness of breath.    Cardiovascular:  Negative for chest pain.   Musculoskeletal:  Negative for myalgias.   Neurological:  Negative for headaches.         Objective:     Vitals:    01/30/25 1128   BP: 126/82   BP Location: Left arm   Patient Position: Sitting   Pulse: 82   Resp: 16   Temp: 96.6 °F (35.9 °C)   TempSrc: Tympanic   SpO2: (!) 94%   Weight: 59 kg (130 lb 1.1 oz)   Height: 5' 4" (1.626 m)            Physical Exam  Vitals reviewed.   Constitutional:       General: She is not in acute distress.     Appearance: She is well-developed.   HENT:      Head: Normocephalic and atraumatic.   Eyes:      General: Lids are normal. No scleral icterus.     Extraocular Movements: " Extraocular movements intact.      Conjunctiva/sclera: Conjunctivae normal.      Pupils: Pupils are equal, round, and reactive to light.   Pulmonary:      Effort: Pulmonary effort is normal.   Musculoskeletal:      Right lower leg: Edema (trace) present.      Left lower leg: Edema (trace) present.   Neurological:      Mental Status: She is alert and oriented to person, place, and time.      Cranial Nerves: No cranial nerve deficit.      Gait: Gait normal.   Psychiatric:         Mood and Affect: Mood and affect normal.           Lab Results   Component Value Date     01/25/2025    K 4.4 01/25/2025     (H) 01/25/2025    CO2 17 (L) 01/25/2025    GLU 91 01/25/2025    BUN 21 01/25/2025    CREATININE 1.4 01/25/2025    CALCIUM 8.3 (L) 01/25/2025    PROT 6.7 01/25/2025    ALBUMIN 3.5 01/25/2025    BILITOT 0.4 01/25/2025    ALKPHOS 46 01/25/2025    AST 32 01/25/2025    ALT 26 01/25/2025    EGFRNORACEVR 37.3 (A) 01/25/2025    ANIONGAP 11 01/25/2025       Lab Results   Component Value Date    CHOL 125 01/25/2025    TRIG 107 01/25/2025    HDL 56 01/25/2025    LDLCALC 47.6 (L) 01/25/2025       Lab Results   Component Value Date    TSH 6.633 (H) 01/25/2025    FREET4 0.78 01/25/2025       Lab Results   Component Value Date    TMZTSRNV64VG 22 (L) 01/25/2025       Assessment:       1. Essential hypertension    2. Hypothyroidism (acquired)    3. Vitamin D deficiency    4. Mixed hyperlipidemia    5. Major depressive disorder, single episode, moderate    6. Diffuse large B-cell lymphoma, unspecified body region    7. Rheumatoid arthritis involving multiple sites with positive rheumatoid factor    8. Chronic diastolic heart failure    9. Atrial fibrillation, unspecified type    10. Stage 4 chronic kidney disease    11. Need for vaccination        Plan:   1. Essential hypertension  -     CBC Auto Differential; Future; Expected date: 07/30/2025    2. Hypothyroidism (acquired)  -     T4, Free; Future; Expected date:  04/30/2025  -     TSH; Future; Expected date: 04/30/2025  -     levothyroxine (SYNTHROID) 112 MCG tablet; Take 1 tablet (112 mcg total) by mouth once daily.  Dispense: 90 tablet; Refill: 3  -     T4, Free; Future; Expected date: 07/30/2025  -     TSH; Future; Expected date: 07/30/2025    3. Vitamin D deficiency  Overview:  Continue supplement    Orders:  -     Vitamin D; Future; Expected date: 07/30/2025    4. Mixed hyperlipidemia  -     Comprehensive Metabolic Panel; Future; Expected date: 07/30/2025  -     Lipid Panel; Future; Expected date: 07/30/2025    5. Major depressive disorder, single episode, moderate    6. Diffuse large B-cell lymphoma, unspecified body region  Overview:  Followed by Hematology/Oncology, continue current treatment plan       7. Rheumatoid arthritis involving multiple sites with positive rheumatoid factor  Overview:  Followed by Rheumatology, continue current treatment plan       8. Chronic diastolic heart failure  Overview:  Followed by Cardiology, continue current treatment plan       9. Atrial fibrillation, unspecified type  Overview:  Followed by Cardiology, continue current treatment plan       10. Stage 4 chronic kidney disease  Overview:  Followed by Nephrology, continue current treatment plan       11. Need for vaccination  -     Influenza - Trivalent (Adjuvanted)      Assessment & Plan    MAJOR DEPRESSIVE DISORDER, SINGLE EPISODE, MODERATE:   Continue Zoloft and Buspar for depression management, as current medications are deemed appropriate.    THYROID DISORDER:   Increase levothyroxine from 100 mcg to 112 mcg daily, to be taken first thing in the morning on an empty stomach, waiting 30 minutes before consuming anything else.   Order thyroid function labs for 3 months.   Note that increasing thyroid medication may potentially help with depression symptoms and resolve edema.   Schedule follow up in 3 months for thyroid lab check.    EDEMA:   Educate patient on reading food labels  for sodium content, including unexpected sources like frozen vegetables and restaurant meals.   Emphasize the importance of monitoring sodium intake across all food sources.    HYPERLIPIDEMIA:   Cholesterol is well-controlled on current medications.    HYPERTENSION:   controlled, continue current medications.    MEDICATIONS/SUPPLEMENTS:   Continue vitamin D prescription (2 different types: 1 taken once weekly, 1 taken 3 times weekly).    FLU VACCINATION:   Administer flu vaccine in office.    RSV VACCINATION:   Instruct the patient to consult with pharmacist about obtaining RSV vaccine.    Patient expressed understanding and agreement with plan.    Visit today included increased complexity associated with the care of the episodic problem hypothyroidism, which was addressed while instituting co-management of the longitudinal care of the patient due to the serious and/or complex managed problem(s) .    I have evaluated and discussed management associated with medical care services that serve as the continuing focal point for all needed health care services and/or with medical care services that are part of ongoing care related to my patient's single, serious condition or a complex condition(s).    I am providing ongoing care and I am the primary care provider for this patient, and they are being managed, monitored, and/or observed for their chronic conditions over time.     I have addressed their ongoing health maintenance requirements and needs for all health care services and reviewed co-management plans provided by specialty providers when available.    Health Maintenance Due   Topic Date Due    RSV Vaccine (Age 60+ and Pregnant patients) (1 - 1-dose 75+ series) Never done         Health Maintenance reviewed/updated.    Follow up in about 6 months (around 7/30/2025), or if symptoms worsen or fail to improve, for annual with Gali JOHNSON, schedule labs, labs PTA.    This note was generated with the assistance of  ambient listening technology. Verbal consent was obtained by the patient and accompanying visitor(s) for the recording of patient appointment to facilitate this note. I attest to having reviewed and edited the generated note for accuracy, though some syntax or spelling errors may persist. Please contact the author of this note for any clarification.

## 2025-01-31 PROBLEM — F32.1 MAJOR DEPRESSIVE DISORDER, SINGLE EPISODE, MODERATE: Status: ACTIVE | Noted: 2025-01-31

## 2025-02-11 ENCOUNTER — OFFICE VISIT (OUTPATIENT)
Dept: SPORTS MEDICINE | Facility: CLINIC | Age: 85
End: 2025-02-11
Payer: MEDICARE

## 2025-02-11 DIAGNOSIS — M12.811 RIGHT ROTATOR CUFF TEAR ARTHROPATHY: Primary | ICD-10-CM

## 2025-02-11 DIAGNOSIS — M75.101 RIGHT ROTATOR CUFF TEAR ARTHROPATHY: Primary | ICD-10-CM

## 2025-02-11 DIAGNOSIS — M12.812 LEFT ROTATOR CUFF TEAR ARTHROPATHY: ICD-10-CM

## 2025-02-11 DIAGNOSIS — M75.102 LEFT ROTATOR CUFF TEAR ARTHROPATHY: ICD-10-CM

## 2025-02-11 PROCEDURE — 1101F PT FALLS ASSESS-DOCD LE1/YR: CPT | Mod: HCNC,CPTII,S$GLB, | Performed by: PHYSICIAN ASSISTANT

## 2025-02-11 PROCEDURE — 20610 DRAIN/INJ JOINT/BURSA W/O US: CPT | Mod: 50,HCNC,S$GLB, | Performed by: PHYSICIAN ASSISTANT

## 2025-02-11 PROCEDURE — 1159F MED LIST DOCD IN RCRD: CPT | Mod: HCNC,CPTII,S$GLB, | Performed by: PHYSICIAN ASSISTANT

## 2025-02-11 PROCEDURE — 3288F FALL RISK ASSESSMENT DOCD: CPT | Mod: HCNC,CPTII,S$GLB, | Performed by: PHYSICIAN ASSISTANT

## 2025-02-11 PROCEDURE — 99214 OFFICE O/P EST MOD 30 MIN: CPT | Mod: HCNC,25,S$GLB, | Performed by: PHYSICIAN ASSISTANT

## 2025-02-11 PROCEDURE — 99999 PR PBB SHADOW E&M-EST. PATIENT-LVL IV: CPT | Mod: PBBFAC,HCNC,, | Performed by: PHYSICIAN ASSISTANT

## 2025-02-11 PROCEDURE — 1157F ADVNC CARE PLAN IN RCRD: CPT | Mod: HCNC,CPTII,S$GLB, | Performed by: PHYSICIAN ASSISTANT

## 2025-02-11 PROCEDURE — 1125F AMNT PAIN NOTED PAIN PRSNT: CPT | Mod: HCNC,CPTII,S$GLB, | Performed by: PHYSICIAN ASSISTANT

## 2025-02-11 PROCEDURE — 1160F RVW MEDS BY RX/DR IN RCRD: CPT | Mod: HCNC,CPTII,S$GLB, | Performed by: PHYSICIAN ASSISTANT

## 2025-02-11 RX ORDER — TRAMADOL HYDROCHLORIDE 50 MG/1
50 TABLET ORAL NIGHTLY PRN
Qty: 30 TABLET | Refills: 0 | Status: SHIPPED | OUTPATIENT
Start: 2025-02-11

## 2025-02-11 RX ORDER — TRIAMCINOLONE ACETONIDE 40 MG/ML
40 INJECTION, SUSPENSION INTRA-ARTICULAR; INTRAMUSCULAR
Status: DISCONTINUED | OUTPATIENT
Start: 2025-02-11 | End: 2025-02-11 | Stop reason: HOSPADM

## 2025-02-11 RX ADMIN — TRIAMCINOLONE ACETONIDE 40 MG: 40 INJECTION, SUSPENSION INTRA-ARTICULAR; INTRAMUSCULAR at 10:02

## 2025-02-11 NOTE — PROCEDURES
Large Joint Aspiration/Injection: bilateral subacromial bursa    Date/Time: 2/11/2025 10:30 AM    Performed by: Kera Alonso PA-C  Authorized by: Kera Alonso PA-C    Consent Done?:  Yes (Verbal)  Indications:  Pain  Site marked: the procedure site was marked    Timeout: prior to procedure the correct patient, procedure, and site was verified    Prep: patient was prepped and draped in usual sterile fashion      Local anesthesia used?: Yes    Anesthesia:  Local infiltration  Local anesthetic:  Lidocaine 1% without epinephrine    Details:  Needle Size:  22 G  Ultrasonic Guidance for needle placement?: No    Approach:  Posterior  Location:  Shoulder  Laterality:  Bilateral  Site:  Bilateral subacromial bursa  Medications (Right):  40 mg triamcinolone acetonide 40 mg/mL  Medications (Left):  40 mg triamcinolone acetonide 40 mg/mL  Patient tolerance:  Patient tolerated the procedure well with no immediate complications    Procedure Note:  We discussed the risk and benefits of injections, including pain, infection, bleeding, damage to adjacent structures, risk of reaction to injection. We discussed the steroid/cortisone injections will not heal the problem but mat help decrease inflammation and help with symptoms. We discussed the risk of repeated injections. The patient expressed understanding and wanted to proceed with the injection. We performed a timeout to verify the proper patient, proper procedure, and the proper site. The injection site was prepared in a sterile fashion. The patient tolerated it well and there were no complication. We did discuss with the patient that steroid injections can cause some increase in blood sugar and blood pressure for up to a week after the injection.

## 2025-02-11 NOTE — PROGRESS NOTES
Orthopaedic Follow-Up Visit    Last Appointment:  11/11/2024  Diagnosis:  Right rotator cuff tear arthropathy  Prior Procedure:  Right SAS CSI 11/11/2024    Violet Swenson is a 84 y.o. female who is here for f/u evaluation of bilateral shoulder pain. The patient was last seen here by me on 11/11/2024 at which point we decided to try a right shoulder injection prior to considering further treatment options. The patient returns today reporting that the symptoms significantly improved with the previously administered injection up until about 1 month ago her pain returned.  She is now endorsing bilateral shoulder pain is interested in injection in both shoulders today.    Recall HPI from 11/11/2024:  Violet presents with bilateral shoulder pain, primarily affecting the right shoulder. She reports severe pain in her right shoulder, stating she was unable to sleep the previous night due to severe arm pain. She has been taking Tylenol arthritis as prescribed by Dr. PANIAGUA, but reports it provides minimal relief. She mentions a previous cortisone injection given by Dr. PANIAGUA in a specific spot (AC joint) on her shoulder, which provided no relief. She fell this morning on her way to the appointment, injuring her right elbow. She describes instances where reaching or extending her arm causes severe pain, stating that sometimes extending her arm causes extreme discomfort. Violet uses a rollator when going out and a cane inside her house due to balance issues, reporting difficulty maintaining balance and walking in a straight line for extended periods. She reports having arthritis in her hands as well, particularly in her thumb. Violet mentions recent life changes, including the death of her  about a year ago, selling her house, and now living in a travel trailer. She also shares that her remaining son has stage 4 cancer and her daughter has severe COPD and asthma but continues to smoke.     Patient's medications,  allergies, past medical, surgical, social and family histories were reviewed and updated as appropriate.    Review of Systems   All systems reviewed were negative.  Specifically, the patient denies fever, chills, weight loss, chest pain, shortness of breath, or dyspnea on exertion.      Past Medical History:   Diagnosis Date    Age-related osteoporosis without current pathological fracture 8/20/2018    Anemia     Anxiety     Arthritis     Atrial flutter     Cancer     lymphoma Large cell B    CHF (congestive heart failure)     Chronic anemia 4/26/2017    Chronic midline low back pain with right-sided sciatica 8/20/2018    Coronary artery disease     01/2015 LHC patent LCX. 50% stenosis in LAD and RCA.      Depression     Disorder of kidney and ureter     Encounter for blood transfusion     GERD (gastroesophageal reflux disease)     Gout, arthritis     Heart failure     Hx of psychiatric care     Hyperlipidemia     Hypertension     Hypothyroidism     Immune deficiency disorder     Kidney disease     Lung nodule 2014    RML--stable    Obesity     Pacemaker     Metronic    Paroxysmal atrial fibrillation 3/15/2018    Pneumonia     Polyneuropathy     chemo induced    Psychiatric problem     Rheumatoid arthritis involving multiple sites with positive rheumatoid factor 4/19/2023    Stroke 11/16/2020    Tobacco dependence     quit 1976    Trouble in sleeping     Unstable angina 11/27/2020       Past Surgical History:   Procedure Laterality Date    APPENDECTOMY  1966 approx    CARDIAC PACEMAKER PLACEMENT  01/22/2015    CHOLECYSTECTOMY  1993    incidental at time of gastric bypass    COLON SURGERY Right 2017    hemicolectomy    COLONOSCOPY N/A 4/6/2017    Procedure: COLONOSCOPY;  Surgeon: Tye Enamorado MD;  Location: [facility];  Service: Endoscopy;  Laterality: N/A;    COLONOSCOPY N/A 11/28/2018    Procedure: COLONOSCOPY;  Surgeon: Saúl Arthur III, MD;  Location: [facility];  Service: Endoscopy;  Laterality: N/A;     CORONARY ANGIOPLASTY  02/2014    CORONARY STENT PLACEMENT  02/05/2014    ESOPHAGOGASTRODUODENOSCOPY N/A 11/28/2018    Procedure: EGD (ESOPHAGOGASTRODUODENOSCOPY);  Surgeon: Saúl Arthur III, MD;  Location: [facility] ENDO;  Service: Endoscopy;  Laterality: N/A;    GASTRIC BYPASS  1993    with incidental choly    HERNIA REPAIR      IMPLANTATION OF BIVENTRICULAR PERMANENT PACEMAKER AS UPGRADE TO EXISTING PACEMAKER Left 7/13/2023    Procedure: Biventricular pacemaker upgrade/His lead vs CRT-P;  Surgeon: Velasquez Vizcaino MD;  Location: [facility] CATH LAB;  Service: Cardiology;  Laterality: Left;  Will need to upgrade her pacemaker.  Ideally his pacing lead would be the best approach. MDT/ Alternatively, an upgrade /His lead as Plan A  and  CRT if fails/notified MDT rep Mell and Arik  NOT MRI safe   Pacer and leads implanted    INJECTION OF ANESTHETIC AGENT INTO SACROILIAC JOINT Right 10/8/2020    Procedure: Right BLOCK, SACROILIAC JOINT with RN IV sedation;  Surgeon: Madi Anton MD;  [facility];  Service: Pain Management;  Laterality: Right;    JOINT REPLACEMENT Bilateral 2009    3 months apart    TONSILLECTOMY  1959    VENOGRAM, CATH LAB Bilateral 6/29/2023    Procedure: Venogram, Cath Lab;  Surgeon: Velasquez Vizcaino MD;  Location: [facility] CATH LAB;  Service: Cardiology;  Laterality: Bilateral;  1:00PM arrival time  NOT MRI safe   Pacer and leads implanted by [facility] 1/22/15   MDTR SEDR01 [place], [id number]   A lead: SAMEER 5076 CapSure Fix [employer], [id number]   RV lead: SAMEER 5076 CapSure Fix [employer], [id number]       Patient's Medications   New Prescriptions    TRAMADOL (ULTRAM) 50 MG TABLET    Take 1 tablet (50 mg total) by mouth nightly as needed for Pain (severe shoulder pain).   Previous Medications    ACETAMINOPHEN (TYLENOL ARTHRITIS PAIN) 650 MG TBSR    Take 650 mg by mouth every 8 (eight) hours.    ALLOPURINOL (ZYLOPRIM) 300 MG TABLET    Take 1 tablet (300 mg total) by mouth once daily.    ASCORBIC  ACID, VITAMIN C, (VITAMIN C) 100 MG TABLET    Take by mouth once daily.    AZELASTINE (ASTELIN) 137 MCG (0.1 %) NASAL SPRAY    1 spray (137 mcg total) by Nasal route 2 (two) times daily.    BUSPIRONE (BUSPAR) 7.5 MG TABLET    Take 1 tablet (7.5 mg total) by mouth 2 (two) times daily.    CALCITRIOL (ROCALTROL) 0.25 MCG CAP    TAKE ONE CAPSULE BY MOUTH EVERY MONDAY, WEDNESDAY AND FRIDAY    CLOPIDOGREL (PLAVIX) 75 MG TABLET    TAKE ONE TABLET BY MOUTH EVERY DAY    DICLOFENAC SODIUM 1 % GEL    Apply 2 g topically once daily. Apply 2 g over painful joints once or twice a day.    DIPRIVAN 10 MG/ML INFUSION        DULOXETINE (CYMBALTA) 30 MG CAPSULE    TAKE ONE CAPSULE BY MOUTH EVERY DAY    ELIQUIS 2.5 MG TAB    TAKE ONE TABLET BY MOUTH TWICE DAILY    FLUTICASONE PROPIONATE (FLONASE) 50 MCG/ACTUATION NASAL SPRAY    2 sprays (100 mcg total) by Each Nostril route once daily.    FUROSEMIDE (LASIX) 40 MG TABLET    TAKE ONE TABLET BY MOUTH EVERY DAY THEN TAKE TWO TABLETS EVERY OTHER DAY    GLUCOSAMINE-D3-BOSWELLIA SERR (OSTEO BI-FLEX, 5-LOXIN,) 1,500-400-100 MG-UNIT-MG TAB    Take by mouth.    HYDROXYCHLOROQUINE (PLAQUENIL) 200 MG TABLET    Take 1 tablet (200 mg total) by mouth every other day.    IRON-VITAMIN C 100-250 MG, ICAR-C, 100-250 MG TAB    TAKE ONE TABLET BY MOUTH EVERY DAY    LEVOCETIRIZINE (XYZAL) 5 MG TABLET    Take 1 tablet (5 mg total) by mouth every evening.    LEVOTHYROXINE (SYNTHROID) 112 MCG TABLET    Take 1 tablet (112 mcg total) by mouth once daily.    METOPROLOL TARTRATE (LOPRESSOR) 25 MG TABLET    Take 1 tablet (25 mg total) by mouth 2 (two) times daily.    MULTIVITAMIN (THERAGRAN) PER TABLET    Take 1 tablet by mouth once daily.    OLMESARTAN (BENICAR) 20 MG TABLET    Take 1 tablet (20 mg total) by mouth once daily.    ORPHENADRINE (NORFLEX) 100 MG TABLET    Take 1 tablet (100 mg total) by mouth 2 (two) times daily.    PRAVASTATIN (PRAVACHOL) 40 MG TABLET    Take 1 tablet (40 mg total) by mouth once  "daily.    PREGABALIN (LYRICA) 50 MG CAPSULE    Take 50 mg by mouth 3 (three) times daily.    SERTRALINE (ZOLOFT) 100 MG TABLET    Take 1.5 tablets (150 mg total) by mouth once daily.    SODIUM BICARBONATE 650 MG TABLET    TAKE ONE TABLET BY MOUTH TWICE DAILY    TUMERIC-GING-OLIVE-OREG-CAPRYL 100 MG-150 MG- 50 MG-150 MG CAP    Take by mouth.    VITAMIN D2 1,250 MCG (50,000 UNIT) CAPSULE    TAKE ONE CAPSULE BY MOUTH EVERY 7 DAYS    ZINC ACETATE ORAL    Take 250 mg by mouth once daily.    Modified Medications    No medications on file   Discontinued Medications    No medications on file       Family History   Problem Relation Name Age of Onset    Heart disease Mother      Hypertension Mother      Cataracts Mother      Stomach cancer Father          "ulcers that turned to cancer"    Cancer Father          stomach    Pancreatic cancer Sister      Cancer Sister          pancreatic    Leukemia Brother          "leukemia which led to intestinal cancer"    Cataracts Brother      Cancer Brother          leukemia then later stomach cancer    Drug abuse Son      Cancer Son          prostate cancer    Prostate cancer Son      COPD Daughter      Asthma Daughter      Hypertension Maternal Grandfather      Stroke Maternal Grandfather      Alcohol abuse Neg Hx      Diabetes Neg Hx      Intellectual disability Neg Hx      Mental illness Neg Hx         Review of patient's allergies indicates:   Allergen Reactions    Corticosteroids (glucocorticoids) Nausea Only and Other (See Comments)     Stomach pain, dizziness, headache    Oxycodone Other (See Comments)     Blood pressure dropped         Objective:      Physical Exam  Patient is alert and oriented, no distress. Skin is intact. Neuro is normal with no focal motor or sensory findings.    Cervical exam is unremarkable. Intact cervical ROM. Negative Spurling's test     Physical Exam:                       RIGHT                                     LEFT     Scap Dyskinesis/Winging       " (-)                                             (-)     Tenderness:                                                                              Greater Tuberosity                  +                                              (-)  Bicipital Groove                       (-)                                             (-)  AC joint                                   (-)                                             (-)  Other:      ROM:  Forward [id number]                                          170  Abduction                    90                                            120  ER (at side)                 60                                            60  IR                                 deferred                                   deferred     Strength:   Supraspinatus             3/5                                           5/5  Infraspinatus               3/5                                           5/5  Subscap / IR               3/5                                           5/5      Special Tests:              Neer:                                       +                                              (-)              Miller:                                 +                                              (-)              SS Stress:                               +                                              (-)              Bear Hug:                                (-)                                            (-)              Jackson's:                                 +                                              (-)              Resisted Thrower's:                +                                              (-)              Speed's                                   (-)                                             (-)              Cross Arm Abduction:             +                                              (-)    Neurovascular examination  - Motor grossly intact bilaterally to shoulder abduction, elbow  flexion and extension, wrist flexion and extension, and intrinsic hand musculature  - Sensation intact to light touch bilaterally in axillary, median, radial, and ulnar distributions  - Symmetrical radial pulses    Imaging:    XR Results:  Results for orders placed during the hospital encounter of 10/29/24    X-Ray Shoulder 2 or more views Bilat    Narrative  EXAMINATION:  XR SHOULDER COMPLETE 2 OR MORE VIEWS BILATERAL    CLINICAL HISTORY:  Pain in right shoulder    TECHNIQUE:  XR SHOULDER COMPLETE 2 OR MORE VIEWS BILATERAL    COMPARISON:  None    FINDINGS:  No evidence of acute fracture.    Right: Moderate hypertrophy of the AC joint.  Moderate joint space narrowing of the glenohumeral joint.    Left: Mild degenerative changes of the AC joint.  Moderate to severe joint space narrowing of the glenohumeral joint.  Redemonstration of the rim remottling of the superolateral humeral head on the possibly from rotator cuff derangement?    Left-sided pacemaker in unchanged position.    Impression  As above.      Electronically signed by: Donato Perdomo  Date:    10/29/2024  Time:    12:58      Physician read: I agree with the above impression.    Assessment/Plan:   Violet Swenson is a 84 y.o. female with rotator cuff tear arthropathy of right shoulder, rotator cuff tear arthropathy of left shoulder    Plan:    Discussed diagnosis and treatment options with the patient today.  Last visit she was diagnosed with a right rotator cuff tear arthropathy.  Her left shoulder symptomatic for left rotator cuff tear arthropathy as well.  We last completed a corticosteroid injection in her right shoulder about 3 months ago, she had about 2 months of excellent pain relief with these injections.  The last month she reports debilitating pain and she is inquiring about what she can do to manage her pain between injections.  She is taking Tylenol Arthritis twice a day at max dose with mild improvement of symptoms.  Discussed I can  send her a low dose pain medication, tramadol, for her to take at night only during increased times of pain once injections wear off.  Tramadol 50 mg provided as needed for shoulder pain at nighttime only.  Discussed adverse side effects with the patient.  Dispense #30, no refills.   reviewed.  For her acute pain today, I recommend move forward bilateral shoulder injections, patient is in agreement with the plan  Bilateral SAS CSI given in clinic, patient tolerated the procedure well with no immediate complications  Follow up with me in 3 months            Kera Alonso PA-C  Sports Medicine Physician Assistant       Disclaimer: This note was prepared using a voice recognition system and is likely to have sound alike errors within the text.

## 2025-02-14 DIAGNOSIS — F51.05 INSOMNIA SECONDARY TO ANXIETY: ICD-10-CM

## 2025-02-14 DIAGNOSIS — F41.8 SITUATIONAL ANXIETY: ICD-10-CM

## 2025-02-14 DIAGNOSIS — F41.9 INSOMNIA SECONDARY TO ANXIETY: ICD-10-CM

## 2025-02-14 DIAGNOSIS — F32.9 MAJOR DEPRESSION, CHRONIC: ICD-10-CM

## 2025-02-14 RX ORDER — SERTRALINE HYDROCHLORIDE 100 MG/1
150 TABLET, FILM COATED ORAL
Qty: 135 TABLET | Refills: 1 | Status: SHIPPED | OUTPATIENT
Start: 2025-02-14

## 2025-04-10 DIAGNOSIS — I25.5 ISCHEMIC CARDIOMYOPATHY: Chronic | ICD-10-CM

## 2025-04-10 DIAGNOSIS — I10 ESSENTIAL HYPERTENSION: Chronic | ICD-10-CM

## 2025-04-10 DIAGNOSIS — M89.49 OTHER HYPERTROPHIC OSTEOARTHROPATHY, MULTIPLE SITES: ICD-10-CM

## 2025-04-10 DIAGNOSIS — G62.9 POLYNEUROPATHY, UNSPECIFIED: ICD-10-CM

## 2025-04-10 DIAGNOSIS — D50.8 OTHER IRON DEFICIENCY ANEMIA: ICD-10-CM

## 2025-04-10 RX ORDER — DULOXETIN HYDROCHLORIDE 30 MG/1
30 CAPSULE, DELAYED RELEASE ORAL
Qty: 30 CAPSULE | Refills: 11 | Status: SHIPPED | OUTPATIENT
Start: 2025-04-10

## 2025-04-10 RX ORDER — FUROSEMIDE 40 MG/1
TABLET ORAL
Qty: 90 TABLET | Refills: 3 | Status: SHIPPED | OUTPATIENT
Start: 2025-04-10

## 2025-04-10 RX ORDER — OLMESARTAN MEDOXOMIL 20 MG/1
20 TABLET ORAL DAILY
Qty: 90 TABLET | Refills: 3 | Status: SHIPPED | OUTPATIENT
Start: 2025-04-10 | End: 2026-04-10

## 2025-04-10 RX ORDER — IRON,CARBONYL/ASCORBIC ACID 100-250 MG
1 TABLET ORAL
Qty: 30 TABLET | Refills: 4 | Status: SHIPPED | OUTPATIENT
Start: 2025-04-10

## 2025-04-22 ENCOUNTER — OFFICE VISIT (OUTPATIENT)
Dept: INTERNAL MEDICINE | Facility: CLINIC | Age: 85
End: 2025-04-22
Payer: MEDICARE

## 2025-04-22 VITALS
RESPIRATION RATE: 16 BRPM | DIASTOLIC BLOOD PRESSURE: 76 MMHG | SYSTOLIC BLOOD PRESSURE: 138 MMHG | WEIGHT: 138.88 LBS | HEART RATE: 78 BPM | OXYGEN SATURATION: 96 % | BODY MASS INDEX: 23.84 KG/M2 | TEMPERATURE: 98 F

## 2025-04-22 DIAGNOSIS — N60.21 LUMPY BREASTS, RIGHT: ICD-10-CM

## 2025-04-22 DIAGNOSIS — I50.22 CHRONIC SYSTOLIC CONGESTIVE HEART FAILURE: ICD-10-CM

## 2025-04-22 DIAGNOSIS — N18.4 STAGE 4 CHRONIC KIDNEY DISEASE: ICD-10-CM

## 2025-04-22 DIAGNOSIS — R60.0 BILATERAL LOWER EXTREMITY EDEMA: Primary | ICD-10-CM

## 2025-04-22 PROCEDURE — 3078F DIAST BP <80 MM HG: CPT | Mod: HCNC,CPTII,S$GLB, | Performed by: FAMILY MEDICINE

## 2025-04-22 PROCEDURE — 99214 OFFICE O/P EST MOD 30 MIN: CPT | Mod: HCNC,S$GLB,, | Performed by: FAMILY MEDICINE

## 2025-04-22 PROCEDURE — 1160F RVW MEDS BY RX/DR IN RCRD: CPT | Mod: HCNC,CPTII,S$GLB, | Performed by: FAMILY MEDICINE

## 2025-04-22 PROCEDURE — 3075F SYST BP GE 130 - 139MM HG: CPT | Mod: HCNC,CPTII,S$GLB, | Performed by: FAMILY MEDICINE

## 2025-04-22 PROCEDURE — 1159F MED LIST DOCD IN RCRD: CPT | Mod: HCNC,CPTII,S$GLB, | Performed by: FAMILY MEDICINE

## 2025-04-22 PROCEDURE — 1125F AMNT PAIN NOTED PAIN PRSNT: CPT | Mod: HCNC,CPTII,S$GLB, | Performed by: FAMILY MEDICINE

## 2025-04-22 PROCEDURE — 99999 PR PBB SHADOW E&M-EST. PATIENT-LVL V: CPT | Mod: PBBFAC,HCNC,, | Performed by: FAMILY MEDICINE

## 2025-04-22 PROCEDURE — G2211 COMPLEX E/M VISIT ADD ON: HCPCS | Mod: HCNC,S$GLB,, | Performed by: FAMILY MEDICINE

## 2025-04-22 PROCEDURE — 1100F PTFALLS ASSESS-DOCD GE2>/YR: CPT | Mod: HCNC,CPTII,S$GLB, | Performed by: FAMILY MEDICINE

## 2025-04-22 PROCEDURE — 3288F FALL RISK ASSESSMENT DOCD: CPT | Mod: HCNC,CPTII,S$GLB, | Performed by: FAMILY MEDICINE

## 2025-04-22 PROCEDURE — 1157F ADVNC CARE PLAN IN RCRD: CPT | Mod: HCNC,CPTII,S$GLB, | Performed by: FAMILY MEDICINE

## 2025-04-22 NOTE — Clinical Note
Patient complaining of lower extremity pain due to edema with some weeping despite taking significant dose of lasix. Please let me know if you have other recommendations for patient. Thanks!

## 2025-04-22 NOTE — PROGRESS NOTES
Subjective:       Patient ID: Violet Swenson is a 85 y.o. female.    CHIEF COMPLAINT:  - Patient presents with concerns about severe swelling in her legs and feet, as well as a newly noticed difference in breast firmness.    HPI:  - Patient reports severe swelling in her legs and feet, which has progressed to the point where one leg is weeping fluid. She describes a sensation of extreme pressure in her legs when walking. She notes that this swelling is unprecedented, even when she was overweight. She has been taking Lasix as prescribed by her cardiologist, Dr. Gil (1 tablet daily and 2 tablets every other day), but reports no improvement in the swelling.  - She mentions a large, hand-sized lump on her left thigh that appeared about 2 weeks ago, causing pain when she lies on that side. She notes a smaller lump developing on her right lateral thigh as well.  - Patient reports a recent change in her right breast, noticed about a week ago when trying on a new bra. She describes the right breast as feeling firm and larger compared to the left breast, which she describes as smaller and softer.  - She reports significant, frequent, intense pain in her legs. She confirms that she elevates her feet, wears compression hose, and watches her salt intake as recommended.  - She is evaluated by Dr. Gil for her heart failure and is scheduled for an appointment on the 8th. She also sees Dr. Goetz, Nephrology. She reports a history of significant weight loss, having lost about 200 lbs.  - She denies any skin changes on her breast or discharge from the nipples. She denies any signs of infection in her legs such as redness or purulent discharge.  Patient is otherwise without concerns today.    ROS:  General: -fever, -chills, -fatigue, -weight gain, -weight loss  Eyes: -eye pain, -vision change  ENMT: -hearing loss, -ear pain, -nasal congestion, -rhinorrhea, -dental problem, -trouble swallowing  Cardiovascular: -chest  pain, -palpitations, +LOWER EXTREMITY EDEMA  Respiratory: -cough, -trouble breathing  Gastrointestinal: -abdominal pain, -abdominal swelling, -nausea, -vomiting, -diarrhea, -constipation, -blood in stool  Genitourinary: -difficulty urinating, -genital problem  Musculoskeletal: +JOINT PAIN, -muscle aches, +LIMB PAIN, +LUMPS/MASSES, +PAIN WITH MOVEMENT  Integumentary: -rash  Lymphatics: -swollen glands, -easy bruising  Neurologic: -headache, -dizziness, -numbness, -weakness, +BALANCE ISSUES, +NERVE PAIN  Psychiatric: -anxiety, -depression, -sleep difficulty  Breasts: +BREAST LUMPS        Objective:     Vitals:    25 1441   BP: 138/76   BP Location: Left arm   Patient Position: Sitting   Pulse: 78   Resp: 16   Temp: 97.9 °F (36.6 °C)   TempSrc: Oral   SpO2: 96%   Weight: 63 kg (138 lb 14.2 oz)          Physical Exam  Vitals reviewed. Exam conducted with a chaperone present.   Constitutional:       General: She is not in acute distress.     Appearance: She is well-developed.   HENT:      Head: Normocephalic and atraumatic.   Eyes:      General: Lids are normal. No scleral icterus.     Extraocular Movements: Extraocular movements intact.      Conjunctiva/sclera: Conjunctivae normal.      Pupils: Pupils are equal, round, and reactive to light.   Cardiovascular:      Rate and Rhythm: Normal rate and regular rhythm.      Heart sounds: No murmur heard.     No friction rub. No gallop.   Pulmonary:      Effort: Pulmonary effort is normal.      Breath sounds: Normal breath sounds. No decreased breath sounds, wheezing, rhonchi or rales.   Chest:   Breasts:     Right: No bleeding, inverted nipple, mass, nipple discharge, skin change or tenderness.      Left: No bleeding, inverted nipple, mass, nipple discharge, skin change or tenderness.      Comments: Right breast without discrete mass, but breast tissue more dense than left breast on palpation  Musculoskeletal:      Right lower le+ Edema present.      Left lower leg:  2+ Edema present.   Neurological:      Mental Status: She is alert and oriented to person, place, and time.      Cranial Nerves: No cranial nerve deficit.      Gait: Gait normal.   Psychiatric:         Mood and Affect: Mood and affect normal.           Assessment:       1. Bilateral lower extremity edema    2. Chronic systolic congestive heart failure    3. Stage 4 chronic kidney disease    4. Lumpy breasts, right        Plan:   1. Bilateral lower extremity edema    2. Chronic systolic congestive heart failure  Overview:  Followed by Cardiology, continue current treatment plan       3. Stage 4 chronic kidney disease  Overview:  Followed by Nephrology, continue current treatment plan       4. Lumpy breasts, right      EDEMA:   Assessed significant leg swelling and weeping, likely related to heart failure and compromised renal function.   Patient to elevate legs to help with swelling.   Patient to wear compression hose.   Patient to watch salt intake.   Patient to hydrate adequately, observing any fluid restrictions from cardiology team.   Discussed risks of skin breaks due to swelling tension, including potential for infection.   Educated on signs of infection to watch for, such as redness, spreading redness, or purulent oozing.   Patient to monitor for signs of infection in legs (redness, spreading redness, purulent oozing).    HIP DISORDERS:   Evaluated hip pain, possibly due to bursitis or lipoma.   Patient will discuss with her orthopedist and follow up as needed.    BREAST DISORDERS:   Examined breast asymmetry, noting increased thickness on right side.   Respected decision to decline further breast imaging or potential treatment at her age.   Contact the office if breast condition worsens or patient changes mind about imaging.      Patient expressed understanding and agreement with plan.    Visit today included increased complexity associated with the care of the episodic problem edema, which was addressed  while instituting co-management of the longitudinal care of the patient due to the serious and/or complex managed problem(s) .    I have evaluated and discussed management associated with medical care services that serve as the continuing focal point for all needed health care services and/or with medical care services that are part of ongoing care related to my patient's single, serious condition or a complex condition(s).    I am providing ongoing care and I am the primary care provider for this patient, and they are being managed, monitored, and/or observed for their chronic conditions over time.     I have addressed their ongoing health maintenance requirements and needs for all health care services and reviewed co-management plans provided by specialty providers when available.    Health Maintenance Due   Topic Date Due    RSV Vaccine (Age 60+ and Pregnant patients) (1 - 1-dose 75+ series) Never done         Follow up if symptoms worsen or fail to improve, for keep routine follow up.    This note was generated with the assistance of ambient listening technology. Verbal consent was obtained by the patient and accompanying visitor(s) for the recording of patient appointment to facilitate this note. I attest to having reviewed and edited the generated note for accuracy, though some syntax or spelling errors may persist. Please contact the author of this note for any clarification.

## 2025-04-23 ENCOUNTER — TELEPHONE (OUTPATIENT)
Dept: INTERNAL MEDICINE | Facility: CLINIC | Age: 85
End: 2025-04-23
Payer: MEDICARE

## 2025-04-23 ENCOUNTER — TELEPHONE (OUTPATIENT)
Dept: NEPHROLOGY | Facility: CLINIC | Age: 85
End: 2025-04-23
Payer: MEDICARE

## 2025-04-23 DIAGNOSIS — N18.4 STAGE 4 CHRONIC KIDNEY DISEASE: ICD-10-CM

## 2025-04-23 DIAGNOSIS — Z91.89 AT HIGH RISK FOR HYPOCALCEMIA: Primary | ICD-10-CM

## 2025-04-23 DIAGNOSIS — I50.22 CHRONIC SYSTOLIC CONGESTIVE HEART FAILURE: ICD-10-CM

## 2025-04-23 DIAGNOSIS — R60.0 BILATERAL LOWER EXTREMITY EDEMA: Primary | ICD-10-CM

## 2025-04-23 NOTE — TELEPHONE ENCOUNTER
----- Message from Marti Coronel MD sent at 4/23/2025 10:56 AM CDT -----  Please advise patient Dr. Gil recommends we check BNP, lab ordered. Thank you!  ----- Message -----  From: Nader Gil MD  Sent: 4/22/2025   7:59 PM CDT  To: Marti Coronel MD    Would check bnp and leg elevation compression socks.  ----- Message -----  From: Marti Coronel MD  Sent: 4/22/2025   7:38 PM CDT  To: Nader Gil MD; Ander Goetz MD    Patient complaining of lower extremity pain due to edema with some weeping despite taking significant dose of lasix. Please let me know if you have other recommendations for patient. Thanks!

## 2025-04-23 NOTE — TELEPHONE ENCOUNTER
L/m that we can see her in nephro at the [place] Monday at 1pm if can do lab this week. 5/23/25/sf

## 2025-04-25 ENCOUNTER — TELEPHONE (OUTPATIENT)
Dept: NEPHROLOGY | Facility: CLINIC | Age: 85
End: 2025-04-25
Payer: MEDICARE

## 2025-05-06 ENCOUNTER — HOSPITAL ENCOUNTER (OUTPATIENT)
Dept: CARDIOLOGY | Facility: HOSPITAL | Age: 85
Discharge: HOME | End: 2025-05-06
Attending: NURSE PRACTITIONER
Payer: MEDICARE

## 2025-05-06 DIAGNOSIS — Z95.0 BIVENTRICULAR CARDIAC PACEMAKER IN SITU: ICD-10-CM

## 2025-05-06 PROCEDURE — 93281 PM DEVICE PROGR EVAL MULTI: CPT | Mod: 26,HCNC,, | Performed by: INTERNAL MEDICINE

## 2025-05-06 PROCEDURE — 93281 PM DEVICE PROGR EVAL MULTI: CPT | Mod: HCNC

## 2025-05-07 ENCOUNTER — INFUSION (OUTPATIENT)
Dept: INFUSION THERAPY | Facility: HOSPITAL | Age: 85
End: 2025-05-07
Attending: INTERNAL MEDICINE
Payer: MEDICARE

## 2025-05-07 ENCOUNTER — OFFICE VISIT (OUTPATIENT)
Dept: RHEUMATOLOGY | Facility: CLINIC | Age: 85
End: 2025-05-07
Payer: MEDICARE

## 2025-05-07 ENCOUNTER — LAB VISIT (OUTPATIENT)
Dept: LAB | Facility: HOSPITAL | Age: 85
End: 2025-05-07
Attending: INTERNAL MEDICINE
Payer: MEDICARE

## 2025-05-07 VITALS
DIASTOLIC BLOOD PRESSURE: 90 MMHG | SYSTOLIC BLOOD PRESSURE: 152 MMHG | BODY MASS INDEX: 23.82 KG/M2 | HEIGHT: 64 IN | WEIGHT: 139.56 LBS | HEART RATE: 62 BPM

## 2025-05-07 VITALS
DIASTOLIC BLOOD PRESSURE: 90 MMHG | HEART RATE: 62 BPM | RESPIRATION RATE: 16 BRPM | OXYGEN SATURATION: 96 % | HEIGHT: 64 IN | SYSTOLIC BLOOD PRESSURE: 152 MMHG | BODY MASS INDEX: 23.82 KG/M2 | TEMPERATURE: 98 F | WEIGHT: 139.56 LBS

## 2025-05-07 DIAGNOSIS — H81.13 BENIGN PAROXYSMAL POSITIONAL VERTIGO DUE TO BILATERAL VESTIBULAR DISORDER: ICD-10-CM

## 2025-05-07 DIAGNOSIS — E78.2 MIXED HYPERLIPIDEMIA: Chronic | ICD-10-CM

## 2025-05-07 DIAGNOSIS — Z79.899 LONG-TERM USE OF PLAQUENIL: ICD-10-CM

## 2025-05-07 DIAGNOSIS — M19.90 UNDIFFERENTIATED INFLAMMATORY ARTHRITIS: Primary | ICD-10-CM

## 2025-05-07 DIAGNOSIS — M81.0 AGE-RELATED OSTEOPOROSIS WITHOUT CURRENT PATHOLOGICAL FRACTURE: Primary | ICD-10-CM

## 2025-05-07 DIAGNOSIS — I25.10 CORONARY ARTERY DISEASE INVOLVING NATIVE CORONARY ARTERY OF NATIVE HEART WITHOUT ANGINA PECTORIS: Chronic | ICD-10-CM

## 2025-05-07 DIAGNOSIS — M15.0 PRIMARY OSTEOARTHRITIS INVOLVING MULTIPLE JOINTS: ICD-10-CM

## 2025-05-07 DIAGNOSIS — Z51.81 ENCOUNTER FOR MEDICATION MONITORING: ICD-10-CM

## 2025-05-07 DIAGNOSIS — M18.9 UNILATERAL OSTEOARTHRITIS OF FIRST CARPOMETACARPAL (CMC) JOINT: ICD-10-CM

## 2025-05-07 DIAGNOSIS — M1A.09X0 IDIOPATHIC CHRONIC GOUT, MULTIPLE SITES, WITHOUT TOPHUS (TOPHI): ICD-10-CM

## 2025-05-07 DIAGNOSIS — M81.0 AGE-RELATED OSTEOPOROSIS WITHOUT CURRENT PATHOLOGICAL FRACTURE: ICD-10-CM

## 2025-05-07 DIAGNOSIS — R60.0 BILATERAL LOWER EXTREMITY EDEMA: ICD-10-CM

## 2025-05-07 DIAGNOSIS — E55.9 VITAMIN D DEFICIENCY: ICD-10-CM

## 2025-05-07 LAB
ALBUMIN SERPL BCP-MCNC: 3.6 G/DL (ref 3.5–5.2)
ALP SERPL-CCNC: 51 UNIT/L (ref 40–150)
ALT SERPL W/O P-5'-P-CCNC: 38 UNIT/L (ref 10–44)
ANION GAP (OHS): 10 MMOL/L (ref 8–16)
AST SERPL-CCNC: 51 UNIT/L (ref 11–45)
BILIRUB SERPL-MCNC: 0.3 MG/DL (ref 0.1–1)
BUN SERPL-MCNC: 25 MG/DL (ref 8–23)
CALCIUM SERPL-MCNC: 8.9 MG/DL (ref 8.7–10.5)
CHLORIDE SERPL-SCNC: 111 MMOL/L (ref 95–110)
CO2 SERPL-SCNC: 23 MMOL/L (ref 23–29)
CREAT SERPL-MCNC: 1.2 MG/DL (ref 0.5–1.4)
GFR SERPLBLD CREATININE-BSD FMLA CKD-EPI: 45 ML/MIN/1.73/M2
GLUCOSE SERPL-MCNC: 94 MG/DL (ref 70–110)
POTASSIUM SERPL-SCNC: 4 MMOL/L (ref 3.5–5.1)
PROT SERPL-MCNC: 6.8 GM/DL (ref 6–8.4)
SODIUM SERPL-SCNC: 144 MMOL/L (ref 136–145)

## 2025-05-07 PROCEDURE — G2211 COMPLEX E/M VISIT ADD ON: HCPCS | Mod: HCNC,S$GLB,, | Performed by: INTERNAL MEDICINE

## 2025-05-07 PROCEDURE — 99215 OFFICE O/P EST HI 40 MIN: CPT | Mod: HCNC,S$GLB,, | Performed by: INTERNAL MEDICINE

## 2025-05-07 PROCEDURE — 80053 COMPREHEN METABOLIC PANEL: CPT | Mod: HCNC

## 2025-05-07 PROCEDURE — 80061 LIPID PANEL: CPT | Mod: HCNC

## 2025-05-07 PROCEDURE — 63600175 PHARM REV CODE 636 W HCPCS: Mod: JZ,TB,HCNC | Performed by: INTERNAL MEDICINE

## 2025-05-07 PROCEDURE — 96372 THER/PROPH/DIAG INJ SC/IM: CPT | Mod: HCNC

## 2025-05-07 PROCEDURE — 3080F DIAST BP >= 90 MM HG: CPT | Mod: CPTII,HCNC,S$GLB, | Performed by: INTERNAL MEDICINE

## 2025-05-07 PROCEDURE — 1160F RVW MEDS BY RX/DR IN RCRD: CPT | Mod: CPTII,HCNC,S$GLB, | Performed by: INTERNAL MEDICINE

## 2025-05-07 PROCEDURE — 1159F MED LIST DOCD IN RCRD: CPT | Mod: CPTII,HCNC,S$GLB, | Performed by: INTERNAL MEDICINE

## 2025-05-07 PROCEDURE — 36415 COLL VENOUS BLD VENIPUNCTURE: CPT | Mod: HCNC

## 2025-05-07 PROCEDURE — 1157F ADVNC CARE PLAN IN RCRD: CPT | Mod: CPTII,HCNC,S$GLB, | Performed by: INTERNAL MEDICINE

## 2025-05-07 PROCEDURE — 99999 PR PBB SHADOW E&M-EST. PATIENT-LVL IV: CPT | Mod: PBBFAC,HCNC,, | Performed by: INTERNAL MEDICINE

## 2025-05-07 PROCEDURE — 1125F AMNT PAIN NOTED PAIN PRSNT: CPT | Mod: CPTII,HCNC,S$GLB, | Performed by: INTERNAL MEDICINE

## 2025-05-07 PROCEDURE — 3077F SYST BP >= 140 MM HG: CPT | Mod: CPTII,HCNC,S$GLB, | Performed by: INTERNAL MEDICINE

## 2025-05-07 RX ORDER — DICLOFENAC SODIUM 10 MG/G
2 GEL TOPICAL DAILY
Qty: 100 G | Refills: 6 | Status: SHIPPED | OUTPATIENT
Start: 2025-05-07

## 2025-05-07 RX ORDER — ALLOPURINOL 300 MG/1
300 TABLET ORAL DAILY
Qty: 90 TABLET | Refills: 11 | Status: SHIPPED | OUTPATIENT
Start: 2025-05-07

## 2025-05-07 RX ADMIN — DENOSUMAB 60 MG: 60 INJECTION SUBCUTANEOUS at 01:05

## 2025-05-07 NOTE — PROGRESS NOTES
RHEUMATOLOGY CLINIC FOLLOW UP VISIT  Chief complaints, HPI, ROS, EXAM, Assessment & Plans:-  Violet Zhao a 85 y.o. pleasant female comes in for follow-up visit.  She follows in the Rheumatology Clinic for osteoporosis and gout.   She denies any significant pain over small joints of bilateral hands .  On Plaquenil.  Complains of worsening dependent edema of both legs.  Also complains of positional vertigo . Positive history of hearing difficulties. Rheumatological review of system negative otherwise.  Physical examination shows No synovitis of small joints..  Pitting pedal edema bilateral lower extremities.   1. Undifferentiated inflammatory arthritis    2. Idiopathic chronic gout, multiple sites, without tophus (tophi)    3. Age-related osteoporosis without current pathological fracture    4. Encounter for medication monitoring    5. Vitamin D deficiency    6. Benign paroxysmal positional vertigo due to bilateral vestibular disorder    7. Unilateral osteoarthritis of first carpometacarpal (CMC) joint    8. Primary osteoarthritis involving multiple joints    9. Long-term use of Plaquenil    10. Bilateral lower extremity edema      Problem List Items Addressed This Visit       Primary osteoarthritis involving multiple joints (Chronic)    Relevant Medications    diclofenac sodium (VOLTAREN) 1 % Gel    Age-related osteoporosis without current pathological fracture    Relevant Orders    DXA Bone Density Axial Skeleton 1 or more sites    Benign paroxysmal positional vertigo due to bilateral vestibular disorder    Relevant Orders    Ambulatory referral/consult to ENT    Bilateral lower extremity edema    Encounter for medication monitoring    Idiopathic chronic gout, multiple sites, without tophus (tophi)    Overview   Continue allopurinol         Relevant Medications    allopurinoL (ZYLOPRIM) 300 MG tablet    Long-term use of Plaquenil    Relevant Orders     Ambulatory referral/consult to Ophthalmology    Undifferentiated inflammatory arthritis - Primary    Unilateral osteoarthritis of first carpometacarpal (CMC) joint    Relevant Medications    diclofenac sodium (VOLTAREN) 1 % Gel    Vitamin D deficiency    Overview   Continue supplement            Labs reviewed today:-   Latest Reference Range & Units 05/07/25 12:15   Sodium 136 - 145 mmol/L 144   Potassium 3.5 - 5.1 mmol/L 4.0   Chloride 95 - 110 mmol/L 111 (H)   CO2 23 - 29 mmol/L 23   Anion Gap 8 - 16 mmol/L 10   BUN 8 - 23 mg/dL 25 (H)   Creatinine 0.5 - 1.4 mg/dL 1.2   eGFR >60 mL/min/1.73/m2 45 (L)   Glucose 70 - 110 mg/dL 94   Calcium 8.7 - 10.5 mg/dL 8.9   ALP 40 - 150 unit/L 51   PROTEIN TOTAL 6.0 - 8.4 gm/dL 6.8   Albumin 3.5 - 5.2 g/dL 3.6   BILIRUBIN TOTAL 0.1 - 1.0 mg/dL 0.3   AST 11 - 45 unit/L 51 (H)   ALT 10 - 44 unit/L 38   (H): Data is abnormally high  (L): Data is abnormally low    Severe osteoporosis on Prolia.  High risk for hypocalcemia.  Prolia today and repeat CMP in 10 days.  Repeat DEXA  Low-grade seropositive rheumatoid stable on Plaquenil every other day.   Worsening bilateral pedal edema with CHF. Advised to follow up with cardiologist.   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  Monitor.  Positional vertigo with hearing loss. Referred to ENT.   Yearly Plaquenil retinal clearance by ophthalmologist advised. Referral sent again today.   I have explained all of the above in detail and the patient understands all of the current recommendation(s). I have answered all questions to the best of my ability and to their complete satisfaction.           # Follow up in about 6 months (around 11/7/2025).      Disclaimer: This note was prepared using voice recognition system and is likely to have sound alike errors and is not proof read.  Please call me with any questions.

## 2025-05-08 ENCOUNTER — TELEPHONE (OUTPATIENT)
Dept: OPHTHALMOLOGY | Facility: CLINIC | Age: 85
End: 2025-05-08
Payer: MEDICARE

## 2025-05-08 ENCOUNTER — RESULTS FOLLOW-UP (OUTPATIENT)
Dept: CARDIOLOGY | Facility: HOSPITAL | Age: 85
End: 2025-05-08

## 2025-05-08 ENCOUNTER — OFFICE VISIT (OUTPATIENT)
Dept: OTOLARYNGOLOGY | Facility: CLINIC | Age: 85
End: 2025-05-08
Payer: MEDICARE

## 2025-05-08 VITALS — HEIGHT: 64 IN | BODY MASS INDEX: 23.95 KG/M2

## 2025-05-08 DIAGNOSIS — M19.90 UNDIFFERENTIATED INFLAMMATORY ARTHRITIS: ICD-10-CM

## 2025-05-08 DIAGNOSIS — R42 INTERMITTENT LIGHTHEADEDNESS: ICD-10-CM

## 2025-05-08 DIAGNOSIS — H91.93 BILATERAL HEARING LOSS, UNSPECIFIED HEARING LOSS TYPE: ICD-10-CM

## 2025-05-08 DIAGNOSIS — H93.13 TINNITUS OF BOTH EARS: ICD-10-CM

## 2025-05-08 DIAGNOSIS — R42 DIZZINESS: Primary | ICD-10-CM

## 2025-05-08 DIAGNOSIS — E55.9 VITAMIN D DEFICIENCY: ICD-10-CM

## 2025-05-08 LAB
CHOLEST SERPL-MCNC: 117 MG/DL (ref 120–199)
CHOLEST/HDLC SERPL: 2 {RATIO} (ref 2–5)
HDLC SERPL-MCNC: 58 MG/DL (ref 40–75)
HDLC SERPL: 49.6 % (ref 20–50)
LDLC SERPL CALC-MCNC: 40.8 MG/DL (ref 63–159)
NONHDLC SERPL-MCNC: 59 MG/DL
TRIGL SERPL-MCNC: 91 MG/DL (ref 30–150)

## 2025-05-08 PROCEDURE — 99999 PR PBB SHADOW E&M-EST. PATIENT-LVL V: CPT | Mod: PBBFAC,HCNC,, | Performed by: PHYSICIAN ASSISTANT

## 2025-05-08 RX ORDER — ERGOCALCIFEROL 1.25 MG/1
50000 CAPSULE ORAL
Qty: 12 CAPSULE | Refills: 1 | Status: SHIPPED | OUTPATIENT
Start: 2025-05-08

## 2025-05-08 RX ORDER — HYDROXYCHLOROQUINE SULFATE 200 MG/1
200 TABLET, FILM COATED ORAL EVERY OTHER DAY
Qty: 45 TABLET | Refills: 1 | Status: SHIPPED | OUTPATIENT
Start: 2025-05-08

## 2025-05-08 NOTE — PROGRESS NOTES
"Subjective     Patient ID: Violet Swenson is a 85 y.o. female.    Chief Complaint: Referral (Referred by Dilshad Rogers for Benign paroxysmal positional vertigo due to bilateral vestibular disorder//)    Patient is a very pleasant 85 y.o. female here to see me today for evaluation of dizziness.   She reports that the symptoms have been present for many years.  On average, the patient reports symptoms never go away.  She describes the dizziness as a sensation of unsteadiness, imbalance, lightheadedness at times and room-spinning other times and says that it never abates.  She has noted that sudden head movements act as a trigger; also looking up, looking down or bending over.  She usually feels better with lying down.  She denies aural pressure, otalgia, and otorrhea.  She has known hearing loss and tinnitus AU; feels like her hearing loss has gradually progressed since her last audiogram in 2020.  She describes the tinnitus as "water noise" that she usually able to tube out.  She has known heart disease and follows with Dr. Gil.  She has a pacemaker.  She has followup with him today as well.  She has not started any new medications, and has not had any recent dietary changes.  She reports having terrible peripheral neuropathy and mentions numerous falls in the past 6 months.  She has hx of BPPV in 2020.          Review of Systems   Constitutional:  Negative for fever.   HENT:  Positive for hearing loss and tinnitus. Negative for ear discharge and ear pain.    Musculoskeletal:  Positive for gait problem (uses a walker).   Neurological:  Positive for dizziness and light-headedness.          Objective     Physical Exam  Constitutional:       Appearance: Normal appearance. She is not ill-appearing.   HENT:      Head: Normocephalic and atraumatic.      Right Ear: Tympanic membrane, ear canal and external ear normal. Decreased hearing noted. No drainage. No middle ear effusion. Tympanic membrane is " not injected or erythematous.      Left Ear: Tympanic membrane, ear canal and external ear normal. Decreased hearing noted. No drainage.  No middle ear effusion. Tympanic membrane is not injected or erythematous.      Ears:      Comments: Small amount of non-occlusive cerumen AU     Nose: Nose normal. No congestion or rhinorrhea.      Mouth/Throat:      Mouth: Mucous membranes are moist.      Pharynx: Oropharynx is clear.   Eyes:      Pupils: Pupils are equal, round, and reactive to light.   Pulmonary:      Effort: Pulmonary effort is normal.   Neurological:      General: No focal deficit present.      Mental Status: She is alert and oriented to person, place, and time.   Psychiatric:         Behavior: Behavior is cooperative.       [place]:  Negative AU        Last audiogram in 2020:       Assessment and Plan     1. Dizziness  -     Ambulatory referral/consult to ENT    2. Intermittent lightheadedness    3. Tinnitus of both ears    4. Bilateral hearing loss, unspecified hearing loss type        I had a long discussion with the patient regarding their symptoms.  Dizziness, vertigo and disequilibrium are common symptoms reported by adults during visits to their doctors. They are all symptoms that can result from a peripheral vestibular disorder (a dysfunction of the balance organs of the inner ear) or central vestibular disorder (a dysfunction of one or more parts of the central nervous system that help process balance and spatial information).  There are also non-vestibular causes of dizziness, and dizziness can be linked to a wide array of problems such as blood-flow irregularities from cardiovascular problems and blood pressure fluctuations.  Her testing today is negative for BPPV.  Discussed that her dizziness/lightheadedness/imbalance is likely multi-factorial.  She could consider trial of vestibular therapy to help reduce her risk of falls.  She wants to consider that and will notify me if she decides to  attend and I can place referral at that time.    She has followup today with Cardiology as well.  Discussed that she should mention her intermittent lightheadedness to them as well.    We reviewed her previous audiogram (2020) together in detail.  I recommend scheduling a repeat audiogram for comparison and I'll review those results once completed.  We also discussed that tinnitus is most often caused by a hearing loss, and that as the hair cells are damaged, either genetic or as a result of loud noise exposure, they then cause tinnitus.  Some patients find that restricting the salt or caffeine in their diet helps, and there is also an OTC supplement, lipoflavinoids, that some people find to be effective though their benefit is not fully proven.  Tinnitus tends to be louder in times of stress and fatigue, and may decrease with time.  Sound machines may also be an effective masking technique if needed at night.             No follow-ups on file.

## 2025-05-08 NOTE — TELEPHONE ENCOUNTER
----- Message from Sumi sent at 5/8/2025  9:44 AM CDT -----  [id number]  Patient has appointment 09/30 with  Ted but is having problems and need a sooner appt. Patient state she only want to see [facility] and Only at [facility] she is now schedule with Ted but does not want to see him want to be worked in with Chi . Please call back to assist

## 2025-05-09 ENCOUNTER — TELEPHONE (OUTPATIENT)
Dept: CARDIOLOGY | Facility: CLINIC | Age: 85
End: 2025-05-09
Payer: MEDICARE

## 2025-05-09 ENCOUNTER — OFFICE VISIT (OUTPATIENT)
Dept: CARDIOLOGY | Facility: CLINIC | Age: 85
End: 2025-05-09
Payer: MEDICARE

## 2025-05-09 VITALS
HEIGHT: 64 IN | DIASTOLIC BLOOD PRESSURE: 70 MMHG | HEART RATE: 74 BPM | OXYGEN SATURATION: 99 % | WEIGHT: 142.44 LBS | BODY MASS INDEX: 24.32 KG/M2 | SYSTOLIC BLOOD PRESSURE: 120 MMHG

## 2025-05-09 DIAGNOSIS — E78.2 MIXED HYPERLIPIDEMIA: Primary | Chronic | ICD-10-CM

## 2025-05-09 DIAGNOSIS — I25.10 CORONARY ARTERY DISEASE INVOLVING NATIVE CORONARY ARTERY OF NATIVE HEART WITHOUT ANGINA PECTORIS: Chronic | ICD-10-CM

## 2025-05-09 DIAGNOSIS — I25.5 ISCHEMIC CARDIOMYOPATHY: Chronic | ICD-10-CM

## 2025-05-09 DIAGNOSIS — I50.32 CHRONIC DIASTOLIC HEART FAILURE: ICD-10-CM

## 2025-05-09 DIAGNOSIS — N18.4 STAGE 4 CHRONIC KIDNEY DISEASE: ICD-10-CM

## 2025-05-09 DIAGNOSIS — Z95.0 CARDIAC PACEMAKER IN SITU: ICD-10-CM

## 2025-05-09 DIAGNOSIS — I10 ESSENTIAL HYPERTENSION: Chronic | ICD-10-CM

## 2025-05-09 DIAGNOSIS — I50.22 CHRONIC SYSTOLIC CONGESTIVE HEART FAILURE: ICD-10-CM

## 2025-05-09 LAB
OHS CV AF BURDEN PERCENT: 100
OHS CV BIV PACING PERCENT: 98.7 %
OHS CV DC REMOTE DEVICE TYPE: NORMAL

## 2025-05-09 PROCEDURE — 1160F RVW MEDS BY RX/DR IN RCRD: CPT | Mod: CPTII,HCNC,S$GLB, | Performed by: INTERNAL MEDICINE

## 2025-05-09 PROCEDURE — 1101F PT FALLS ASSESS-DOCD LE1/YR: CPT | Mod: CPTII,HCNC,S$GLB, | Performed by: INTERNAL MEDICINE

## 2025-05-09 PROCEDURE — 3288F FALL RISK ASSESSMENT DOCD: CPT | Mod: CPTII,HCNC,S$GLB, | Performed by: INTERNAL MEDICINE

## 2025-05-09 PROCEDURE — 99999 PR PBB SHADOW E&M-EST. PATIENT-LVL IV: CPT | Mod: PBBFAC,HCNC,, | Performed by: INTERNAL MEDICINE

## 2025-05-09 PROCEDURE — 3078F DIAST BP <80 MM HG: CPT | Mod: CPTII,HCNC,S$GLB, | Performed by: INTERNAL MEDICINE

## 2025-05-09 PROCEDURE — 3074F SYST BP LT 130 MM HG: CPT | Mod: CPTII,HCNC,S$GLB, | Performed by: INTERNAL MEDICINE

## 2025-05-09 PROCEDURE — 99214 OFFICE O/P EST MOD 30 MIN: CPT | Mod: HCNC,S$GLB,, | Performed by: INTERNAL MEDICINE

## 2025-05-09 PROCEDURE — 1159F MED LIST DOCD IN RCRD: CPT | Mod: CPTII,HCNC,S$GLB, | Performed by: INTERNAL MEDICINE

## 2025-05-09 PROCEDURE — 1157F ADVNC CARE PLAN IN RCRD: CPT | Mod: CPTII,HCNC,S$GLB, | Performed by: INTERNAL MEDICINE

## 2025-05-09 NOTE — PROGRESS NOTES
Subjective:   Patient ID:  Violet Swenson is a 85 y.o. female who presents for follow-up of No chief complaint on file.  Khuri pt  Pt with c/o BLE edema, dizziness, falls  BP ok she states  Pt saw ENT , crystals ok  Echo 2024 EF 45%  Pt takes 1 lasix qod and 2 lasix qod  Not orthostatic on exam-120/70    Congestive Heart Failure  Presents for follow-up visit. Associated symptoms include edema. Pertinent negatives include no chest pain, chest pressure, muscle weakness, palpitations or shortness of breath. The symptoms have been stable. Her past medical history is significant for CAD. Compliance with total regimen is %. Compliance with diet is %. Compliance with exercise is %. Compliance with medications is %.   Coronary Artery Disease  Presents for follow-up visit. Pertinent negatives include no chest pain, chest pressure, chest tightness, dizziness, leg swelling, muscle weakness, palpitations, shortness of breath or weight gain. Her past medical history is significant for CHF. The symptoms have been stable. Compliance with diet is good. Compliance with exercise is good. Compliance with medications is good.       Review of Systems   Constitutional: Negative. Negative for weight gain.   HENT: Negative.     Eyes: Negative.    Cardiovascular: Negative.  Negative for chest pain, leg swelling and palpitations.   Respiratory: Negative.  Negative for chest tightness and shortness of breath.    Endocrine: Negative.    Hematologic/Lymphatic: Negative.    Skin: Negative.    Musculoskeletal:  Negative for muscle weakness.   Gastrointestinal: Negative.    Genitourinary: Negative.    Neurological: Negative.  Negative for dizziness.   Psychiatric/Behavioral: Negative.     Allergic/Immunologic: Negative.    All other systems reviewed and are negative.    Family History   Problem Relation Name Age of Onset    Heart disease Mother      Hypertension Mother      Cataracts Mother      Stomach cancer  "Father          "ulcers that turned to cancer"    Cancer Father          stomach    Pancreatic cancer Sister      Cancer Sister          pancreatic    Leukemia Brother          "leukemia which led to intestinal cancer"    Cataracts Brother      Cancer Brother          leukemia then later stomach cancer    Drug abuse Son      Cancer Son          prostate cancer    Prostate cancer Son      COPD Daughter      Asthma Daughter      Hypertension Maternal Grandfather      Stroke Maternal Grandfather      Alcohol abuse Neg Hx      Diabetes Neg Hx      Intellectual disability Neg Hx      Mental illness Neg Hx       Past Medical History:   Diagnosis Date    Age-related osteoporosis without current pathological fracture 8/20/2018    Anemia     Anxiety     Arthritis     Atrial flutter     Cancer     lymphoma Large cell B    CHF (congestive heart failure)     Chronic anemia 4/26/2017    Chronic midline low back pain with right-sided sciatica 8/20/2018    Coronary artery disease     01/2015 LHC patent LCX. 50% stenosis in LAD and RCA.      Depression     Disorder of kidney and ureter     Encounter for blood transfusion     GERD (gastroesophageal reflux disease)     Gout, arthritis     Heart failure     Hx of psychiatric care     Hyperlipidemia     Hypertension     Hypothyroidism     Immune deficiency disorder     Kidney disease     Lung nodule 2014    RML--stable    Obesity     Pacemaker     Metronic    Paroxysmal atrial fibrillation 3/15/2018    Pneumonia     Polyneuropathy     chemo induced    Psychiatric problem     Rheumatoid arthritis involving multiple sites with positive rheumatoid factor 4/19/2023    Stroke 11/16/2020    Tobacco dependence     quit 1976    Trouble in sleeping     Unstable angina 11/27/2020     Social History[1]  Medications Ordered Prior to Encounter[2]  Review of patient's allergies indicates:   Allergen Reactions    Corticosteroids (glucocorticoids) Nausea Only and Other (See Comments)     Stomach pain, " dizziness, headache    Oxycodone Other (See Comments)     Blood pressure dropped       Objective:     Physical Exam  Vitals and nursing note reviewed.   Constitutional:       Appearance: She is well-developed.   HENT:      Head: Normocephalic and atraumatic.   Eyes:      Conjunctiva/sclera: Conjunctivae normal.      Pupils: Pupils are equal, round, and reactive to light.   Cardiovascular:      Rate and Rhythm: Normal rate and regular rhythm.      Pulses: Intact distal pulses.      Heart sounds: Normal heart sounds.   Pulmonary:      Effort: Pulmonary effort is normal.      Breath sounds: Normal breath sounds.   Abdominal:      General: Bowel sounds are normal.      Palpations: Abdomen is soft.   Musculoskeletal:         General: Normal range of motion.      Cervical back: Normal range of motion and neck supple.   Skin:     General: Skin is warm and dry.   Neurological:      Mental Status: She is alert and oriented to person, place, and time.         Assessment:     1. Mixed hyperlipidemia    2. Ischemic cardiomyopathy    3. Essential hypertension    4. Coronary artery disease : multiple vessels, s/p PTCA    5. Chronic systolic congestive heart failure    6. Chronic diastolic heart failure    7. Cardiac pacemaker in situ    8. Stage 4 chronic kidney disease        Plan:     Mixed hyperlipidemia    Ischemic cardiomyopathy    Essential hypertension    Coronary artery disease : multiple vessels, s/p PTCA    Chronic systolic congestive heart failure    Chronic diastolic heart failure    Cardiac pacemaker in situ    Stage 4 chronic kidney disease      Take 2 lasix q day x 1 week-edema  BP diary  Echo  PPM check         [1]   Social History  Socioeconomic History    Marital status:     Number of children: 4   Occupational History    Occupation: Retired   Tobacco Use    Smoking status: Former     Current packs/day: 0.00     Average packs/day: 2.0 packs/day for 6.0 years (12.0 ttl pk-yrs)     Types: Cigarettes      Start date: 3/15/1970     Quit date: 3/15/1976     Years since quittin.1    Smokeless tobacco: Never   Substance and Sexual Activity    Alcohol use: No     Alcohol/week: 0.0 standard drinks of alcohol    Drug use: No    Sexual activity: Not Currently     Partners: Male   Social History Narrative    , lives with . She is a retired . 4 children (3 natural born, 1 adopted)- 2 ; 2x  (currently 17 years); son has prostate cancer, daughter COPD,  cardiac difficulty. She still drives. Does not have a Living will or Advanced Directive.     Social Drivers of Health     Financial Resource Strain: Low Risk  (2021)    Overall Financial Resource Strain (CARDIA)     Difficulty of Paying Living Expenses: Not hard at all   Food Insecurity: No Food Insecurity (2021)    Hunger Vital Sign     Worried About Running Out of Food in the Last Year: Never true     Ran Out of Food in the Last Year: Never true   Transportation Needs: No Transportation Needs (2021)    PRAPARE - Transportation     Lack of Transportation (Medical): No     Lack of Transportation (Non-Medical): No   Physical Activity: Inactive (2021)    Exercise Vital Sign     Days of Exercise per Week: 0 days     Minutes of Exercise per Session: 0 min   Stress: Stress Concern Present (2021)    [employer] [place] [employer] - Occupational Stress Questionnaire     Feeling of Stress : To some extent   Housing Stability: Low Risk  (2021)    Housing Stability Vital Sign     Unable to Pay for Housing in the Last Year: No     Number of Places Lived in the Last Year: 1     Unstable Housing in the Last Year: No   [2]   Current Outpatient Medications on File Prior to Visit   Medication Sig Dispense Refill    acetaminophen (TYLENOL ARTHRITIS PAIN) 650 MG TbSR Take 650 mg by mouth every 8 (eight) hours.      allopurinoL (ZYLOPRIM) 300 MG tablet Take 1 tablet (300 mg total) by mouth once daily. 90  tablet 11    ascorbic acid, vitamin C, (VITAMIN C) 100 MG tablet Take by mouth once daily.      azelastine (ASTELIN) 137 mcg (0.1 %) nasal spray 1 spray (137 mcg total) by Nasal route 2 (two) times daily. 30 mL 11    busPIRone (BUSPAR) 7.5 MG tablet Take 1 tablet (7.5 mg total) by mouth 2 (two) times daily. 60 tablet 11    calcitRIOL (ROCALTROL) 0.25 MCG Cap TAKE ONE CAPSULE BY MOUTH EVERY MONDAY, WEDNESDAY AND FRIDAY 12 capsule 3    clopidogreL (PLAVIX) 75 mg tablet TAKE ONE TABLET BY MOUTH EVERY DAY 30 tablet 6    diclofenac sodium (VOLTAREN) 1 % Gel Apply 2 g topically once daily. Apply 2 g over painful joints once or twice a day. 100 g 6    DIPRIVAN 10 mg/mL infusion       DULoxetine (CYMBALTA) 30 MG capsule TAKE ONE CAPSULE BY MOUTH EVERY DAY 30 capsule 11    ELIQUIS 2.5 mg Tab TAKE ONE TABLET BY MOUTH TWICE DAILY 180 tablet 3    ergocalciferol (ERGOCALCIFEROL) 50,000 unit Cap TAKE ONE CAPSULE BY MOUTH EVERY 7 DAYS 12 capsule 1    fluticasone propionate (FLONASE) 50 mcg/actuation nasal spray 2 sprays (100 mcg total) by Each Nostril route once daily. 16 g 11    furosemide (LASIX) 40 MG tablet TAKE ONE TABLET (40MG) BY MOUTH EVERY DAY; THEN TAKE TWO TABLETS (80MG TOTAL) EVERY OTHER DAY 90 tablet 3    glucosamine-D3-Boswellia serr (OSTEO BI-FLEX, 5-LOXIN,) 1,500-400-100 mg-unit-mg Tab Take by mouth.      hydroxychloroquine (PLAQUENIL) 200 mg tablet Take 1 tablet (200 mg total) by mouth every other day. 45 tablet 1    iron-vitamin C 100-250 mg, ICAR-C, 100-250 mg Tab TAKE ONE TABLET BY MOUTH EVERY DAY 30 tablet 4    levocetirizine (XYZAL) 5 MG tablet Take 1 tablet (5 mg total) by mouth every evening. 30 tablet 11    levothyroxine (SYNTHROID) 112 MCG tablet Take 1 tablet (112 mcg total) by mouth once daily. 90 tablet 3    metoprolol tartrate (LOPRESSOR) 25 MG tablet Take 1 tablet (25 mg total) by mouth 2 (two) times daily. 60 tablet 6    multivitamin (THERAGRAN) per tablet Take 1 tablet by mouth once daily.       olmesartan (BENICAR) 20 MG tablet Take 1 tablet (20 mg total) by mouth once daily. 90 tablet 3    orphenadrine (NORFLEX) 100 mg tablet Take 1 tablet (100 mg total) by mouth 2 (two) times daily. 10 tablet 0    pravastatin (PRAVACHOL) 40 MG tablet Take 1 tablet (40 mg total) by mouth once daily. 90 tablet 6    pregabalin (LYRICA) 50 MG capsule Take 50 mg by mouth 3 (three) times daily.      sertraline (ZOLOFT) 100 MG tablet TAKE 1 & 1/2 TABLETS BY MOUTH EVERY  tablet 1    sodium bicarbonate 650 MG tablet TAKE ONE TABLET BY MOUTH TWICE DAILY 60 tablet 11    traMADoL (ULTRAM) 50 mg tablet Take 1 tablet (50 mg total) by mouth nightly as needed for Pain (severe shoulder pain). 30 tablet 0    tumeric-ging-olive-oreg-capryl 100 mg-150 mg- 50 mg-150 mg Cap Take by mouth.      ZINC ACETATE ORAL Take 250 mg by mouth once daily.       Current Facility-Administered Medications on File Prior to Visit   Medication Dose Route Frequency Provider Last Rate Last Admin    denosumab (PROLIA) injection 60 mg  60 mg Subcutaneous Q6 Months Dilshad Rogers MD   60 mg at 08/29/18 5255

## 2025-05-09 NOTE — TELEPHONE ENCOUNTER
----- Message from Nader Gil MD sent at 5/8/2025  6:12 PM CDT -----  Labs look good  ----- Message -----  From: Lab, Background User  Sent: 5/8/2025  12:32 AM CDT  To: Nader Gil MD

## 2025-05-13 ENCOUNTER — OFFICE VISIT (OUTPATIENT)
Dept: SPORTS MEDICINE | Facility: CLINIC | Age: 85
End: 2025-05-13
Payer: MEDICARE

## 2025-05-13 VITALS — HEIGHT: 64 IN | BODY MASS INDEX: 24.32 KG/M2 | WEIGHT: 142.44 LBS

## 2025-05-13 DIAGNOSIS — M12.811 RIGHT ROTATOR CUFF TEAR ARTHROPATHY: Primary | ICD-10-CM

## 2025-05-13 DIAGNOSIS — M75.101 RIGHT ROTATOR CUFF TEAR ARTHROPATHY: Primary | ICD-10-CM

## 2025-05-13 PROCEDURE — 1160F RVW MEDS BY RX/DR IN RCRD: CPT | Mod: CPTII,HCNC,S$GLB, | Performed by: PHYSICIAN ASSISTANT

## 2025-05-13 PROCEDURE — 99999 PR PBB SHADOW E&M-EST. PATIENT-LVL IV: CPT | Mod: PBBFAC,HCNC,, | Performed by: PHYSICIAN ASSISTANT

## 2025-05-13 PROCEDURE — 1159F MED LIST DOCD IN RCRD: CPT | Mod: CPTII,HCNC,S$GLB, | Performed by: PHYSICIAN ASSISTANT

## 2025-05-13 PROCEDURE — 99214 OFFICE O/P EST MOD 30 MIN: CPT | Mod: HCNC,25,S$GLB, | Performed by: PHYSICIAN ASSISTANT

## 2025-05-13 PROCEDURE — 1125F AMNT PAIN NOTED PAIN PRSNT: CPT | Mod: CPTII,HCNC,S$GLB, | Performed by: PHYSICIAN ASSISTANT

## 2025-05-13 PROCEDURE — 20610 DRAIN/INJ JOINT/BURSA W/O US: CPT | Mod: HCNC,RT,S$GLB, | Performed by: PHYSICIAN ASSISTANT

## 2025-05-13 PROCEDURE — 1157F ADVNC CARE PLAN IN RCRD: CPT | Mod: CPTII,HCNC,S$GLB, | Performed by: PHYSICIAN ASSISTANT

## 2025-05-13 RX ORDER — METHYLPREDNISOLONE ACETATE 80 MG/ML
40 INJECTION, SUSPENSION INTRA-ARTICULAR; INTRALESIONAL; INTRAMUSCULAR; SOFT TISSUE
Status: DISCONTINUED | OUTPATIENT
Start: 2025-05-13 | End: 2025-05-13 | Stop reason: HOSPADM

## 2025-05-13 RX ORDER — TRAMADOL HYDROCHLORIDE 50 MG/1
50 TABLET, FILM COATED ORAL EVERY 6 HOURS PRN
Qty: 36 TABLET | Refills: 0 | Status: SHIPPED | OUTPATIENT
Start: 2025-05-13

## 2025-05-13 RX ORDER — CALCITRIOL 0.25 UG/1
CAPSULE ORAL
Qty: 12 CAPSULE | Refills: 3 | Status: SHIPPED | OUTPATIENT
Start: 2025-05-13

## 2025-05-13 RX ADMIN — METHYLPREDNISOLONE ACETATE 40 MG: 80 INJECTION, SUSPENSION INTRA-ARTICULAR; INTRALESIONAL; INTRAMUSCULAR; SOFT TISSUE at 10:05

## 2025-05-13 NOTE — PROCEDURES
Large Joint Aspiration/Injection: R subacromial bursa    Date/Time: 5/13/2025 10:00 AM    Performed by: Kera Alonso PA-C  Authorized by: Kera Alonso PA-C    Consent Done?:  Yes (Verbal)  Indications:  Pain  Site marked: the procedure site was marked    Timeout: prior to procedure the correct patient, procedure, and site was verified    Prep: patient was prepped and draped in usual sterile fashion      Local anesthesia used?: Yes    Anesthesia:  Local infiltration  Local anesthetic:  Lidocaine 1% without epinephrine    Details:  Needle Size:  22 G  Ultrasonic Guidance for needle placement?: No    Approach:  Posterior  Location:  Shoulder  Site:  R subacromial bursa  Medications:  40 mg methylPREDNISolone acetate 80 mg/mL  Patient tolerance:  Patient tolerated the procedure well with no immediate complications     Procedure Note:  We discussed the risk and benefits of injections, including pain, infection, bleeding, damage to adjacent structures, risk of reaction to injection. We discussed the steroid/cortisone injections will not heal the problem but mat help decrease inflammation and help with symptoms. We discussed the risk of repeated injections. The patient expressed understanding and wanted to proceed with the injection. We performed a timeout to verify the proper patient, proper procedure, and the proper site. The injection site was prepared in a sterile fashion. The patient tolerated it well and there were no complication. We did discuss with the patient that steroid injections can cause some increase in blood sugar and blood pressure for up to a week after the injection.

## 2025-05-13 NOTE — PROGRESS NOTES
Orthopaedic Follow-Up Visit    Last Appointment:  02/11/2025  Diagnosis:  Right rotator cuff tear arthropathy, left rotator cuff tear arthropathy  Prior Procedure:  Bilateral SAS CSI      Violet Swenson is a 85 y.o. female who is here for f/u evaluation of bilateral shoulder pain, right shoulder primarily painful today. The patient was last seen here by me on 02/11/2025 at which point we decided to try bilateral shoulder corticosteroid injections prior to considering further treatment options. The patient returns today reporting that her left shoulder has been feeling well since receiving this injection.  Her right shoulder pain has persisted, reporting little to no relief with her last right shoulder injection.     Recall HPI from 11/11/2024:  Violet presents with bilateral shoulder pain, primarily affecting the right shoulder. She reports severe pain in her right shoulder, stating she was unable to sleep the previous night due to severe arm pain. She has been taking Tylenol arthritis as prescribed by Dr. PANIAGUA, but reports it provides minimal relief. She mentions a previous cortisone injection given by Dr. PANIAGUA in a specific spot (AC joint) on her shoulder, which provided no relief. She fell this morning on her way to the appointment, injuring her right elbow. She describes instances where reaching or extending her arm causes severe pain, stating that sometimes extending her arm causes extreme discomfort. Violet uses a rollator when going out and a cane inside her house due to balance issues, reporting difficulty maintaining balance and walking in a straight line for extended periods. She reports having arthritis in her hands as well, particularly in her thumb. Violet mentions recent life changes, including the death of her  about a year ago, selling her house, and now living in a travel trailer. She also shares that her remaining son has stage 4 cancer and her daughter has severe COPD and asthma but  continues to smoke.     Patient's medications, allergies, past medical, surgical, social and family histories were reviewed and updated as appropriate.    Review of Systems   All systems reviewed were negative.  Specifically, the patient denies fever, chills, weight loss, chest pain, shortness of breath, or dyspnea on exertion.      Past Medical History:   Diagnosis Date    Age-related osteoporosis without current pathological fracture 8/20/2018    Anemia     Anxiety     Arthritis     Atrial flutter     Cancer     lymphoma Large cell B    CHF (congestive heart failure)     Chronic anemia 4/26/2017    Chronic midline low back pain with right-sided sciatica 8/20/2018    Coronary artery disease     01/2015 C patent LCX. 50% stenosis in LAD and RCA.      Depression     Disorder of kidney and ureter     Encounter for blood transfusion     GERD (gastroesophageal reflux disease)     Gout, arthritis     Heart failure     Hx of psychiatric care     Hyperlipidemia     Hypertension     Hypothyroidism     Immune deficiency disorder     Kidney disease     Lung nodule 2014    RML--stable    Obesity     Pacemaker     Metronic    Paroxysmal atrial fibrillation 3/15/2018    Pneumonia     Polyneuropathy     chemo induced    Psychiatric problem     Rheumatoid arthritis involving multiple sites with positive rheumatoid factor 4/19/2023    Stroke 11/16/2020    Tobacco dependence     quit 1976    Trouble in sleeping     Unstable angina 11/27/2020       Past Surgical History:   Procedure Laterality Date    APPENDECTOMY  1966 approx    CARDIAC PACEMAKER PLACEMENT  01/22/2015    CHOLECYSTECTOMY  1993    incidental at time of gastric bypass    COLON SURGERY Right 2017    hemicolectomy    COLONOSCOPY N/A 4/6/2017    Procedure: COLONOSCOPY;  Surgeon: Tye Enamorado MD;  Location: [facility];  Service: Endoscopy;  Laterality: N/A;    COLONOSCOPY N/A 11/28/2018    Procedure: COLONOSCOPY;  Surgeon: Saúl Arthur III, MD;  Location: [facility];   Service: Endoscopy;  Laterality: N/A;    CORONARY ANGIOPLASTY  02/2014    CORONARY STENT PLACEMENT  02/05/2014    ESOPHAGOGASTRODUODENOSCOPY N/A 11/28/2018    Procedure: EGD (ESOPHAGOGASTRODUODENOSCOPY);  Surgeon: Saúl Arthur III, MD;  Location: [facility] ENDO;  Service: Endoscopy;  Laterality: N/A;    GASTRIC BYPASS  1993    with incidental choly    HERNIA REPAIR      IMPLANTATION OF BIVENTRICULAR PERMANENT PACEMAKER AS UPGRADE TO EXISTING PACEMAKER Left 7/13/2023    Procedure: Biventricular pacemaker upgrade/His lead vs CRT-P;  Surgeon: Velasquez Vizcaino MD;  Location: [facility] CATH LAB;  Service: Cardiology;  Laterality: Left;  Will need to upgrade her pacemaker.  Ideally his pacing lead would be the best approach. MDT/ Alternatively, an upgrade /His lead as Plan A  and  CRT if fails/notified MDT rep Arabella  NOT MRI safe   Pacer and leads implanted    INJECTION OF ANESTHETIC AGENT INTO SACROILIAC JOINT Right 10/8/2020    Procedure: Right BLOCK, SACROILIAC JOINT with RN IV sedation;  Surgeon: Madi Anton MD;  Location: [facility];  Service: Pain Management;  Laterality: Right;    JOINT REPLACEMENT Bilateral 2009    3 months apart    TONSILLECTOMY  1959    VENOGRAM, CATH LAB Bilateral 6/29/2023    Procedure: Venogram, Cath Lab;  Surgeon: Velasquez Vizcaino MD;  Location: [facility] CATH LAB;  Service: Cardiology;  Laterality: Bilateral;  1:00PM arrival time  NOT MRI safe   Pacer and leads implanted by [facility] 1/22/15   MDTR [id number] [place], [id number]   A lead: SAMEER 5076 [employer] Fix [employer], [id number]   RV lead: SAMEER 5076 Cap[employer] Fix [employer], [id number]       Patient's Medications   New Prescriptions    No medications on file   Previous Medications    ACETAMINOPHEN (TYLENOL ARTHRITIS PAIN) 650 MG TBSR    Take 650 mg by mouth every 8 (eight) hours.    ALLOPURINOL (ZYLOPRIM) 300 MG TABLET    Take 1 tablet (300 mg total) by mouth once daily.    ASCORBIC ACID, VITAMIN C, (VITAMIN C) 100 MG TABLET    Take by  mouth once daily.    AZELASTINE (ASTELIN) 137 MCG (0.1 %) NASAL SPRAY    1 spray (137 mcg total) by Nasal route 2 (two) times daily.    BUSPIRONE (BUSPAR) 7.5 MG TABLET    Take 1 tablet (7.5 mg total) by mouth 2 (two) times daily.    CALCITRIOL (ROCALTROL) 0.25 MCG CAP    TAKE ONE CAPSULE BY MOUTH EVERY MONDAY, WEDNESDAY, AND FRIDAY    CLOPIDOGREL (PLAVIX) 75 MG TABLET    TAKE ONE TABLET BY MOUTH EVERY DAY    DICLOFENAC SODIUM (VOLTAREN) 1 % GEL    Apply 2 g topically once daily. Apply 2 g over painful joints once or twice a day.    DIPRIVAN 10 MG/ML INFUSION        DULOXETINE (CYMBALTA) 30 MG CAPSULE    TAKE ONE CAPSULE BY MOUTH EVERY DAY    ELIQUIS 2.5 MG TAB    TAKE ONE TABLET BY MOUTH TWICE DAILY    ERGOCALCIFEROL (ERGOCALCIFEROL) 50,000 UNIT CAP    TAKE ONE CAPSULE BY MOUTH EVERY 7 DAYS    FLUTICASONE PROPIONATE (FLONASE) 50 MCG/ACTUATION NASAL SPRAY    2 sprays (100 mcg total) by Each Nostril route once daily.    FUROSEMIDE (LASIX) 40 MG TABLET    TAKE ONE TABLET (40MG) BY MOUTH EVERY DAY; THEN TAKE TWO TABLETS (80MG TOTAL) EVERY OTHER DAY    GLUCOSAMINE-D3-BOSWELLIA SERR (OSTEO BI-FLEX, 5-LOXIN,) 1,500-400-100 MG-UNIT-MG TAB    Take by mouth.    HYDROXYCHLOROQUINE (PLAQUENIL) 200 MG TABLET    Take 1 tablet (200 mg total) by mouth every other day.    IRON-VITAMIN C 100-250 MG, ICAR-C, 100-250 MG TAB    TAKE ONE TABLET BY MOUTH EVERY DAY    LEVOCETIRIZINE (XYZAL) 5 MG TABLET    Take 1 tablet (5 mg total) by mouth every evening.    LEVOTHYROXINE (SYNTHROID) 112 MCG TABLET    Take 1 tablet (112 mcg total) by mouth once daily.    METOPROLOL TARTRATE (LOPRESSOR) 25 MG TABLET    Take 1 tablet (25 mg total) by mouth 2 (two) times daily.    MULTIVITAMIN (THERAGRAN) PER TABLET    Take 1 tablet by mouth once daily.    OLMESARTAN (BENICAR) 20 MG TABLET    Take 1 tablet (20 mg total) by mouth once daily.    ORPHENADRINE (NORFLEX) 100 MG TABLET    Take 1 tablet (100 mg total) by mouth 2 (two) times daily.    PRAVASTATIN  "(PRAVACHOL) 40 MG TABLET    Take 1 tablet (40 mg total) by mouth once daily.    PREGABALIN (LYRICA) 50 MG CAPSULE    Take 50 mg by mouth 3 (three) times daily.    SERTRALINE (ZOLOFT) 100 MG TABLET    TAKE 1 & 1/2 TABLETS BY MOUTH EVERY DAY    SODIUM BICARBONATE 650 MG TABLET    TAKE ONE TABLET BY MOUTH TWICE DAILY    TRAMADOL (ULTRAM) 50 MG TABLET    Take 1 tablet (50 mg total) by mouth nightly as needed for Pain (severe shoulder pain).    TUMERIC-GING-OLIVE-OREG-CAPRYL 100 MG-150 MG- 50 MG-150 MG CAP    Take by mouth.    ZINC ACETATE ORAL    Take 250 mg by mouth once daily.   Modified Medications    No medications on file   Discontinued Medications    No medications on file       Family History   Problem Relation Name Age of Onset    Heart disease Mother      Hypertension Mother      Cataracts Mother      Stomach cancer Father          "ulcers that turned to cancer"    Cancer Father          stomach    Pancreatic cancer Sister      Cancer Sister          pancreatic    Leukemia Brother          "leukemia which led to intestinal cancer"    Cataracts Brother      Cancer Brother          leukemia then later stomach cancer    Drug abuse Son      Cancer Son          prostate cancer    Prostate cancer Son      COPD Daughter      Asthma Daughter      Hypertension Maternal Grandfather      Stroke Maternal Grandfather      Alcohol abuse Neg Hx      Diabetes Neg Hx      Intellectual disability Neg Hx      Mental illness Neg Hx         Review of patient's allergies indicates:   Allergen Reactions    Corticosteroids (glucocorticoids) Nausea Only and Other (See Comments)     Stomach pain, dizziness, headache    Oxycodone Other (See Comments)     Blood pressure dropped         Objective:      Physical Exam  Patient is alert and oriented, no distress. Skin is intact. Neuro is normal with no focal motor or sensory findings.    Cervical exam is unremarkable. Intact cervical ROM. Negative Spurling's test  Ambulates with Rollator  "     Physical Exam:                       RIGHT                                     LEFT     Scap Dyskinesis/Winging       (-)                                             (-)     Tenderness:                                                                              Greater Tuberosity                  +                                              (-)  Bicipital Groove                       (-)                                             (-)  AC joint                                   (-)                                             (-)  Other:      ROM:  Forward Elevation 150                                          170  Abduction                    90                                            120  ER (at side)                 60                                            60  IR                                 deferred                                   deferred     Strength:   Supraspinatus              3/5                                           5/5  Infraspinatus                3/5                                           5/5  Subscap / IR                3/5                                           5/5      Special Tests:              Neer:                                       +                                              (-)              Miller:                                 +                                              (-)              SS Stress:                               +                                              (-)              Bear Hug:                                (-)                                            (-)              Strafford's:                                 +                                              (-)              Resisted Thrower's:                +                                              (-)              Speed's                                   (-)                                             (-)              Cross Arm Abduction:             +                                               (-)       Neurovascular examination  - Motor grossly intact bilaterally to shoulder abduction, elbow flexion and extension, wrist flexion and extension, and intrinsic hand musculature  - Sensation intact to light touch bilaterally in axillary, median, radial, and ulnar distributions  - Symmetrical radial pulses    Imaging:    XR Results:  Results for orders placed during the hospital encounter of 10/29/24    X-Ray Shoulder 2 or more views Bilat    Narrative  EXAMINATION:  XR SHOULDER COMPLETE 2 OR MORE VIEWS BILATERAL    CLINICAL HISTORY:  Pain in right shoulder    TECHNIQUE:  XR SHOULDER COMPLETE 2 OR MORE VIEWS BILATERAL    COMPARISON:  None    FINDINGS:  No evidence of acute fracture.    Right: Moderate hypertrophy of the AC joint.  Moderate joint space narrowing of the glenohumeral joint.    Left: Mild degenerative changes of the AC joint.  Moderate to severe joint space narrowing of the glenohumeral joint.  Redemonstration of the rim remottling of the superolateral humeral head on the possibly from rotator cuff derangement?    Left-sided pacemaker in unchanged position.    Impression  As above.      Electronically signed by: Donato Perdomo  Date:    10/29/2024  Time:    12:58      Physician read: I agree with the above impression.    Assessment/Plan:   Violet Swenson is a 85 y.o. female with rotator cuff tear arthropathy of both shoulders    Plan:    Discussed diagnosis and treatment options with the patient today.  At last visit she was diagnosed with bilateral rotator cuff tear arthropathy  We last completed bilateral corticosteroid injections about 3 months ago.  She near resolution of symptoms in her left shoulder with the previously administered injections; however, her right shoulder still continues to bother her.  She reports no relief with her last injection.  We discussed further treatment options today in the form of operative management, repeat injection with  different steroid medication, referral to pain management for suprascapular nerve block. She reports she is not interested in operative management and she is leery to try a suprascapular nerve block as she has had family members who have had this in the past with no improvement of symptoms.  After much discussion, she would like to try a repeat injection, we will trial a different steroid CSI today. At last visit we tried Kenalog, we will do a higher dose Depo-Medrol today. She has no diabetes and her blood pressure is within reason.  Right shoulder SAS CSI given in clinic, Depo-Medrol, patient tolerated the procedure well with no immediate complications  At last visit I also prescribed her a short course of tramadol for her acute flare-ups of pain. I prescribed her 30 tablets of 50 mg tramadol. I reviewed the , she has not had any further narcotics since she failed this tramadol. OK to refilled tramadol today. She is to take this primarily at night and only for severe right shoulder symptoms, patient voiced understanding  Rx for tramadol 50 mg PRN for severe right shoulder pain provided, no refills  She has been falling more recently and she has been seen and evaluated by her PCP and her cardiologist in the last 2 weeks for this matter. She has been ambulating with a Rollator since her increased falls and she has ordered a Life Alert necklace to start wearing incase she falls.  Encouraged her to continue follow up with the PCP and cardiologist for her frequent falls, patient voiced understanding  Follow up with me in about 3 months          Kera Alonso PA-C  Sports Medicine Physician Assistant       Disclaimer: This note was prepared using a voice recognition system and is likely to have sound alike errors within the text.

## 2025-05-19 ENCOUNTER — HOSPITAL ENCOUNTER (OUTPATIENT)
Dept: CARDIOLOGY | Facility: HOSPITAL | Age: 85
Discharge: HOME | End: 2025-05-19
Attending: INTERNAL MEDICINE
Payer: MEDICARE

## 2025-05-19 VITALS
HEIGHT: 64 IN | SYSTOLIC BLOOD PRESSURE: 120 MMHG | BODY MASS INDEX: 24.24 KG/M2 | WEIGHT: 142 LBS | DIASTOLIC BLOOD PRESSURE: 70 MMHG | HEART RATE: 60 BPM

## 2025-05-19 DIAGNOSIS — Z95.0 CARDIAC PACEMAKER IN SITU: ICD-10-CM

## 2025-05-19 LAB
AORTIC ROOT ANNULUS: 2.8 CM
AORTIC SIZE INDEX (SOV): 1.5 CM/M2
AORTIC SIZE INDEX: 1.5 CM/M2
ASCENDING AORTA: 2.5 CM
AV INDEX (PROSTH): 0.26
AV MEAN GRADIENT: 12 MMHG
AV PEAK GRADIENT: 23 MMHG
AV VALVE AREA BY VELOCITY RATIO: 0.8 CM²
AV VALVE AREA: 0.8 CM²
AV VELOCITY RATIO: 0.25
BSA FOR ECHO PROCEDURE: 1.71 M2
CV ECHO LV RWT: 0.53 CM
DOP CALC AO PEAK VEL: 2.4 M/S
DOP CALC AO VTI: 50.3 CM
DOP CALC LVOT AREA: 3.1 CM2
DOP CALC LVOT DIAMETER: 2 CM
DOP CALC LVOT PEAK VEL: 0.6 M/S
DOP CALC LVOT STROKE VOLUME: 41.4 CM3
DOP CALC RVOT PEAK VEL: 0.45 M/S
DOP CALC RVOT VTI: 9.9 CM
DOP CALCLVOT PEAK VEL VTI: 13.2 CM
E WAVE DECELERATION TIME: 166 MSEC
E/A RATIO: 3.94
E/E' RATIO: 14 M/S
ECHO LV POSTERIOR WALL: 1.2 CM (ref 0.6–1.1)
FRACTIONAL SHORTENING: 26.7 % (ref 28–44)
INTERVENTRICULAR SEPTUM: 1.1 CM (ref 0.6–1.1)
IVC DIAMETER: 1.61 CM
IVRT: 86 MSEC
LA MAJOR: 5.2 CM
LA MINOR: 4.9 CM
LA WIDTH: 3.6 CM
LEFT ATRIUM AREA SYSTOLIC (APICAL 2 CHAMBER): 17.25 CM2
LEFT ATRIUM AREA SYSTOLIC (APICAL 4 CHAMBER): 18.34 CM2
LEFT ATRIUM SIZE: 4.5 CM
LEFT ATRIUM VOLUME INDEX MOD: 28 ML/M2
LEFT ATRIUM VOLUME INDEX: 41 ML/M2
LEFT ATRIUM VOLUME MOD: 48 ML
LEFT ATRIUM VOLUME: 69 CM3
LEFT INTERNAL DIMENSION IN SYSTOLE: 3.3 CM (ref 2.1–4)
LEFT VENTRICLE DIASTOLIC VOLUME INDEX: 53.85 ML/M2
LEFT VENTRICLE DIASTOLIC VOLUME: 91 ML
LEFT VENTRICLE END SYSTOLIC VOLUME APICAL 2 CHAMBER: 44.92 ML
LEFT VENTRICLE END SYSTOLIC VOLUME APICAL 4 CHAMBER: 46.4 ML
LEFT VENTRICLE MASS INDEX: 110.3 G/M2
LEFT VENTRICLE SYSTOLIC VOLUME INDEX: 26.6 ML/M2
LEFT VENTRICLE SYSTOLIC VOLUME: 45 ML
LEFT VENTRICULAR INTERNAL DIMENSION IN DIASTOLE: 4.5 CM (ref 3.5–6)
LEFT VENTRICULAR MASS: 186.4 G
LV LATERAL E/E' RATIO: 10.9 M/S
LV SEPTAL E/E' RATIO: 17.8 M/S
LVED V (TEICH): 91.05 ML
LVES V (TEICH): 44.79 ML
LVOT MG: 0.88 MMHG
LVOT MV: 0.44 CM/S
MV PEAK A VEL: 0.36 M/S
MV PEAK E VEL: 1.42 M/S
MV STENOSIS PRESSURE HALF TIME: 48.03 MS
MV VALVE AREA P 1/2 METHOD: 4.58 CM2
OHS CV RV/LV RATIO: 0.8 CM
PISA TR MAX VEL: 3.2 M/S
PULM VEIN S/D RATIO: 1.5
PV MEAN GRADIENT: 0 MMHG
PV PEAK D VEL: 0.3 M/S
PV PEAK GRADIENT: 3 MMHG
PV PEAK S VEL: 0.45 M/S
PV PEAK VELOCITY: 0.9 M/S
RA MAJOR: 4.67 CM
RA PRESSURE ESTIMATED: 3 MMHG
RA WIDTH: 3.84 CM
RIGHT VENTRICLE DIASTOLIC BASEL DIMENSION: 3.6 CM
RIGHT VENTRICULAR END-DIASTOLIC DIMENSION: 3.55 CM
RV TB RVSP: 6 MMHG
SINUS: 2.58 CM
STJ: 2.3 CM
TDI LATERAL: 0.13 M/S
TDI SEPTAL: 0.08 M/S
TDI: 0.11 M/S
TR MAX PG: 41 MMHG
TRICUSPID ANNULAR PLANE SYSTOLIC EXCURSION: 1.6 CM
TV REST PULMONARY ARTERY PRESSURE: 44 MMHG
Z-SCORE OF LEFT VENTRICULAR DIMENSION IN END DIASTOLE: -0.45
Z-SCORE OF LEFT VENTRICULAR DIMENSION IN END SYSTOLE: 0.98

## 2025-05-19 PROCEDURE — 93306 TTE W/DOPPLER COMPLETE: CPT | Mod: HCNC

## 2025-05-19 PROCEDURE — 93306 TTE W/DOPPLER COMPLETE: CPT | Mod: 26,HCNC,, | Performed by: INTERNAL MEDICINE

## 2025-05-20 ENCOUNTER — RESULTS FOLLOW-UP (OUTPATIENT)
Dept: CARDIOLOGY | Facility: HOSPITAL | Age: 85
End: 2025-05-20

## 2025-07-03 ENCOUNTER — OFFICE VISIT (OUTPATIENT)
Dept: OTOLARYNGOLOGY | Facility: CLINIC | Age: 85
End: 2025-07-03
Payer: MEDICARE

## 2025-07-03 ENCOUNTER — CLINICAL SUPPORT (OUTPATIENT)
Dept: AUDIOLOGY | Facility: CLINIC | Age: 85
End: 2025-07-03
Payer: MEDICARE

## 2025-07-03 VITALS — WEIGHT: 120 LBS | BODY MASS INDEX: 20.49 KG/M2 | HEIGHT: 64 IN

## 2025-07-03 DIAGNOSIS — H61.21 IMPACTED CERUMEN, RIGHT EAR: ICD-10-CM

## 2025-07-03 DIAGNOSIS — H93.13 TINNITUS OF BOTH EARS: ICD-10-CM

## 2025-07-03 DIAGNOSIS — H90.3 SENSORINEURAL HEARING LOSS, BILATERAL: Primary | ICD-10-CM

## 2025-07-03 DIAGNOSIS — R42 DIZZINESS: Primary | ICD-10-CM

## 2025-07-03 DIAGNOSIS — H81.8X9 OTHER DISORDERS OF VESTIBULAR FUNCTION, UNSPECIFIED EAR: ICD-10-CM

## 2025-07-03 DIAGNOSIS — H90.3 SENSORINEURAL HEARING LOSS (SNHL) OF BOTH EARS: ICD-10-CM

## 2025-07-03 DIAGNOSIS — H91.93 BILATERAL HEARING LOSS, UNSPECIFIED HEARING LOSS TYPE: ICD-10-CM

## 2025-07-03 PROCEDURE — 99999 PR PBB SHADOW E&M-EST. PATIENT-LVL IV: CPT | Mod: PBBFAC,HCNC,, | Performed by: OTOLARYNGOLOGY

## 2025-07-03 PROCEDURE — 99999 PR PBB SHADOW E&M-EST. PATIENT-LVL II: CPT | Mod: PBBFAC,HCNC,, | Performed by: AUDIOLOGIST-HEARING AID FITTER

## 2025-07-03 NOTE — PROGRESS NOTES
"Subjective     Patient ID: Violet Swenson is a 85 y.o. female.    Chief Complaint: Cerumen Impaction (Pt states dizziness started a couple of years ago but getting worse. Position changes make it worse. Unable to lean head back, bend over. Pt states she looses her balance  a lot. )    Patient is a very pleasant 85 y.o. female here to see me today for evaluation of dizziness.   She reports that the symptoms have been present for many years.  On average, the patient reports symptoms never go away.  She describes the dizziness as a sensation of unsteadiness, imbalance, lightheadedness at times and room-spinning other times and says that it never abates.  She has noted that sudden head movements act as a trigger; also looking up, looking down or bending over.  She usually feels better with lying down.  She denies aural pressure, otalgia, and otorrhea.  She has known hearing loss and tinnitus AU; feels like her hearing loss has gradually progressed since her last audiogram in 2020.  She describes the tinnitus as "water noise" that she usually able to tube out.  She has known heart disease and follows with Dr. Gil.  She has a pacemaker.  She has followup with him today as well.  She has not started any new medications, and has not had any recent dietary changes.  She reports having terrible peripheral neuropathy and mentions numerous falls in the past 6 months.  She has hx of BPPV in 2020.    7/3/25 update:   she continues to have generalized balance issues.  She reports having more difficulty with rapid head movements or specifically when walking/upright.          Review of Systems   Constitutional:  Negative for fever.   HENT:  Positive for hearing loss and tinnitus. Negative for ear discharge and ear pain.    Musculoskeletal:  Positive for gait problem (uses a walker).   Neurological:  Positive for dizziness and light-headedness.          Objective     Physical Exam  Constitutional:       Appearance: " Normal appearance. She is not ill-appearing.   HENT:      Head: Normocephalic and atraumatic.      Right Ear: Tympanic membrane, ear canal and external ear normal. Decreased hearing noted. No drainage. No middle ear effusion. There is impacted cerumen. Tympanic membrane is not injected or erythematous.      Left Ear: Tympanic membrane, ear canal and external ear normal. Decreased hearing noted. No drainage.  No middle ear effusion. Tympanic membrane is not injected or erythematous.      Ears:      Comments: Small amount of non-occlusive cerumen AU     Nose: Nose normal. No congestion or rhinorrhea.      Mouth/Throat:      Mouth: Mucous membranes are moist.      Pharynx: Oropharynx is clear.   Eyes:      Pupils: Pupils are equal, round, and reactive to light.   Pulmonary:      Effort: Pulmonary effort is normal.   Neurological:      General: No focal deficit present.      Mental Status: She is alert and oriented to person, place, and time.   Psychiatric:         Behavior: Behavior is cooperative.       [place]:  Negative AU    Procedure Note    CHIEF COMPLAINT:  Cerumen Impaction    Description:  The patient was seated in an exam chair.  An ear speculum was placed in the right EAC and was examined under the microscope.  Suction and/or loop curettes were used to remove a large cerumen impaction.  The tympanic membrane was visualized and was normal in appearance.  The patient tolerated the procedure well.      Last audiogram in 2020:       Assessment and Plan     1. Dizziness  -     Ambulatory Referral/Consult to Physical Therapy; Future; Expected date: 07/10/2025    2. Tinnitus of both ears    3. Sensorineural hearing loss (SNHL) of both ears        I had a long discussion with the patient regarding their symptoms.  Dizziness, vertigo and disequilibrium are common symptoms reported by adults during visits to their doctors. They are all symptoms that can result from a peripheral vestibular disorder (a dysfunction  of the balance organs of the inner ear) or central vestibular disorder (a dysfunction of one or more parts of the central nervous system that help process balance and spatial information).  There are also non-vestibular causes of dizziness, and dizziness can be linked to a wide array of problems such as blood-flow irregularities from cardiovascular problems and blood pressure fluctuations.  Her testing today is negative for BPPV.  Discussed that her dizziness/lightheadedness/imbalance is likely multi-factorial.  She could consider trial of vestibular therapy to help reduce her risk of falls.  She wants to consider that and will notify me if she decides to attend and I can place referral at that time.    She has followup today with Cardiology as well.  Discussed that she should mention her intermittent lightheadedness to them as well.    We reviewed her previous audiogram (2020) together in detail.  I recommend scheduling a repeat audiogram for comparison and I'll review those results once completed.  We also discussed that tinnitus is most often caused by a hearing loss, and that as the hair cells are damaged, either genetic or as a result of loud noise exposure, they then cause tinnitus.  Some patients find that restricting the salt or caffeine in their diet helps, and there is also an OTC supplement, lipoflavinoids, that some people find to be effective though their benefit is not fully proven.  Tinnitus tends to be louder in times of stress and fatigue, and may decrease with time.  Sound machines may also be an effective masking technique if needed at night.      7/3/25 update:    We had a long talk about her symptoms.  I explained that she likely has multifactorial disequilibrium.  Her balance issues stem from her neuropathy, polypharmacy, age, fluctuations in her heart rate/blood pressure and possibly inner ear problems.  I think that whatever contribution her vestibular system has to her overall balance issues  is minimal compared to the other factors.  We discussed possible options for improving her balance and I recommended that she try vestibular rehab.  I have placed a referral       No follow-ups on file.

## 2025-07-03 NOTE — PROGRESS NOTES
"Referring provider: Sara Pepe PA-C/Dr. Tobias Lazo Uli Swenson was seen 07/03/2025 for a vestibular screen.  Patient complains of dizziness. She reports that the symptoms have been present for many years.  On average, the patient reports symptoms never go away.  She describes the dizziness as a sensation of unsteadiness, imbalance, lightheadedness at times and room-spinning other times and says that it never abates.  She has noted that sudden head movements act as a trigger; also looking up, looking down or bending over.  She usually feels better with lying down.  She denies aural pressure, otalgia, and otorrhea.  She has known hearing loss and tinnitus AU; feels like her hearing loss has gradually progressed since her last audiogram in 2020.  She describes the tinnitus as "water noise" that she usually able to tube out.  She has known heart disease and follows with Dr. Gil.  She has a pacemaker.  She has not started any new medications, and has not had any recent dietary changes.  She reports having terrible peripheral neuropathy and mentions numerous falls in the past 6 months.  She has hx of BPPV in 2020.     Audiogram:  Results reveal a moderate sensorineural hearing loss 250-8000 Hz for the right ear, and a moderate sensorineural hearing loss 250-8000 Hz for the left ear.   Speech Reception Thresholds were 50 dBHL for the right ear and 45 dBHL for the left ear.   Word recognition scores were fair for the right ear and fair for the left ear.   Tympanograms were Type As for the right ear and Type As for the left ear.    VNG Screen:  Spontaneous test: Absent for nystagmus  Gaze test: Absent for nystagmus  Head-shake test: 3 d/s left-beating nystagmus with normal fixation suppression  Static Positional test: Absent for nystagmus  [place]-Hallpike Right: Negative for BPPV  [place]-Hallpike Left: Negative for BPPV - mild 4 d/s left-beating positional nystagmus. Patient was unsteady upon sitting reported " lightheaded sensation.     [place]-Hallpike was repeated without the video goggles. No nystagmus or dizziness was elicited to either side; however, patient once again demonstrated unsteadiness with lightheaded sensation upon sitting from head-hanging left only.     Summary: Non-localizing vestibular screen, demonstrated by left-beating after-head shake and head-hanging left positional nystagmus. Negative for BPPV.     Recommendations:  1. ENT at completion of this visit  2. Hearing aids, binaural. Patient is provided copy of her audiograms and hearing aid information packet. She may also check her [employer] plan.   3. Consider trial with VRT    Tracings are to be scanned.

## 2025-07-09 DIAGNOSIS — E87.20 ACIDOSIS: ICD-10-CM

## 2025-07-09 RX ORDER — APIXABAN 2.5 MG/1
2.5 TABLET, FILM COATED ORAL 2 TIMES DAILY
Qty: 180 TABLET | Refills: 3 | Status: SHIPPED | OUTPATIENT
Start: 2025-07-09

## 2025-07-09 RX ORDER — SODIUM BICARBONATE 650 MG/1
650 TABLET ORAL 2 TIMES DAILY
Qty: 60 TABLET | Refills: 11 | Status: SHIPPED | OUTPATIENT
Start: 2025-07-09

## 2025-07-17 ENCOUNTER — OFFICE VISIT (OUTPATIENT)
Dept: CARDIOLOGY | Facility: CLINIC | Age: 85
End: 2025-07-17
Payer: MEDICARE

## 2025-07-17 VITALS
BODY MASS INDEX: 21.07 KG/M2 | HEIGHT: 64 IN | OXYGEN SATURATION: 98 % | HEART RATE: 74 BPM | SYSTOLIC BLOOD PRESSURE: 95 MMHG | WEIGHT: 123.44 LBS | DIASTOLIC BLOOD PRESSURE: 61 MMHG

## 2025-07-17 DIAGNOSIS — Z95.0 CARDIAC PACEMAKER IN SITU: ICD-10-CM

## 2025-07-17 DIAGNOSIS — M05.79 RHEUMATOID ARTHRITIS INVOLVING MULTIPLE SITES WITH POSITIVE RHEUMATOID FACTOR: ICD-10-CM

## 2025-07-17 DIAGNOSIS — I70.0 ATHEROSCLEROSIS OF AORTA: ICD-10-CM

## 2025-07-17 DIAGNOSIS — M15.0 PRIMARY OSTEOARTHRITIS INVOLVING MULTIPLE JOINTS: Chronic | ICD-10-CM

## 2025-07-17 DIAGNOSIS — I48.21 PERMANENT ATRIAL FIBRILLATION: Primary | ICD-10-CM

## 2025-07-17 DIAGNOSIS — I25.10 CORONARY ARTERY DISEASE INVOLVING NATIVE CORONARY ARTERY OF NATIVE HEART WITHOUT ANGINA PECTORIS: Chronic | ICD-10-CM

## 2025-07-17 DIAGNOSIS — I10 ESSENTIAL HYPERTENSION: Chronic | ICD-10-CM

## 2025-07-17 DIAGNOSIS — I25.10 CAD, MULTIPLE VESSEL: Primary | ICD-10-CM

## 2025-07-17 DIAGNOSIS — R60.0 PEDAL EDEMA: ICD-10-CM

## 2025-07-17 DIAGNOSIS — R60.0 BILATERAL LOWER EXTREMITY EDEMA: ICD-10-CM

## 2025-07-17 DIAGNOSIS — I25.5 ISCHEMIC CARDIOMYOPATHY: Chronic | ICD-10-CM

## 2025-07-17 DIAGNOSIS — I25.118 ATHEROSCLEROSIS OF NATIVE CORONARY ARTERY OF NATIVE HEART WITH STABLE ANGINA PECTORIS: ICD-10-CM

## 2025-07-17 DIAGNOSIS — E07.9 THYROID DISORDER: ICD-10-CM

## 2025-07-17 DIAGNOSIS — Z98.84 S/P GASTRIC BYPASS: Chronic | ICD-10-CM

## 2025-07-17 DIAGNOSIS — D64.9 CHRONIC ANEMIA: Chronic | ICD-10-CM

## 2025-07-17 DIAGNOSIS — N18.4 STAGE 4 CHRONIC KIDNEY DISEASE: ICD-10-CM

## 2025-07-17 DIAGNOSIS — E78.2 MIXED HYPERLIPIDEMIA: Chronic | ICD-10-CM

## 2025-07-17 DIAGNOSIS — I25.5 ISCHEMIC CARDIOMYOPATHY: ICD-10-CM

## 2025-07-17 DIAGNOSIS — Z98.61 S/P PTCA (PERCUTANEOUS TRANSLUMINAL CORONARY ANGIOPLASTY): ICD-10-CM

## 2025-07-17 DIAGNOSIS — R06.02 SOB (SHORTNESS OF BREATH): ICD-10-CM

## 2025-07-17 PROCEDURE — 1160F RVW MEDS BY RX/DR IN RCRD: CPT | Mod: CPTII,HCNC,S$GLB, | Performed by: INTERNAL MEDICINE

## 2025-07-17 PROCEDURE — 1159F MED LIST DOCD IN RCRD: CPT | Mod: CPTII,HCNC,S$GLB, | Performed by: INTERNAL MEDICINE

## 2025-07-17 PROCEDURE — 3288F FALL RISK ASSESSMENT DOCD: CPT | Mod: CPTII,HCNC,S$GLB, | Performed by: INTERNAL MEDICINE

## 2025-07-17 PROCEDURE — 99215 OFFICE O/P EST HI 40 MIN: CPT | Mod: HCNC,S$GLB,, | Performed by: INTERNAL MEDICINE

## 2025-07-17 PROCEDURE — 99999 PR PBB SHADOW E&M-EST. PATIENT-LVL V: CPT | Mod: PBBFAC,HCNC,, | Performed by: INTERNAL MEDICINE

## 2025-07-17 PROCEDURE — 3074F SYST BP LT 130 MM HG: CPT | Mod: CPTII,HCNC,S$GLB, | Performed by: INTERNAL MEDICINE

## 2025-07-17 PROCEDURE — 1101F PT FALLS ASSESS-DOCD LE1/YR: CPT | Mod: CPTII,HCNC,S$GLB, | Performed by: INTERNAL MEDICINE

## 2025-07-17 PROCEDURE — 1126F AMNT PAIN NOTED NONE PRSNT: CPT | Mod: CPTII,HCNC,S$GLB, | Performed by: INTERNAL MEDICINE

## 2025-07-17 PROCEDURE — 3078F DIAST BP <80 MM HG: CPT | Mod: CPTII,HCNC,S$GLB, | Performed by: INTERNAL MEDICINE

## 2025-07-17 PROCEDURE — 1157F ADVNC CARE PLAN IN RCRD: CPT | Mod: CPTII,HCNC,S$GLB, | Performed by: INTERNAL MEDICINE

## 2025-07-17 NOTE — PROGRESS NOTES
"Subjective:   Patient ID:  Violet Swenson is a 85 y.o. female who presents for follow up of No chief complaint on file.      HPI  9/17/2020   79 yo female with ;ymphoma  S/p pcae cad s/p stent ckd heart failure ios here for f/u she has low back pain limited has had recurrent episodes of intrascapular pain radiating to left arm feels like muscle spasm none with exertion. No chf symptoms no leg swelling tia claudication chest pain syncope near syncope/.no palpitation. Has some improvement in appetite  .  11/16  Violet Swenson is a 80 y.o. female patient with a h/o anemia, anxiety, atrial flutter, lymphoma large cell B, CHF, CAD, depression, GERD, gout, heart failure, HLD, HTN, hypothyroidism kidney disease, lung nodule, obesity, metronic pacemaker, polyneuropathy, who presents to the Emergency Department for evaluation of fatigue which onset this morning. Per [facility], the pt has been intermittently stuttering her speech and was hypoglycemic upon arriving on scene. Pt's daughter states that the pt woke up and was chatting to her before she left the house around 0800 this morning. The pt reports falling back asleep and waking up around 0930 feeling very weak and fatigued. She states, "when I rolled over after waking up, it felt like someone was pushing me back in bed." Associated sxs include R arm weakness, generalized weakness, slurred speech, and trouble ambulating. Patient denies any fever, SOB, CP, n/v, numbness, dizziness, and all other sxs at this time. In the ED, H/H 8.9/28.6, Creatinine 1.5, , TSH 4.087, CT head with no acute findings. Tele-stroke recommended MRI/MRA brain, MRA neck lipid panel, hemoglobin A1c. Add ASA to Plavix if x 30 days if no contraindication. Atorvastatin 40 mg daily. Neurology, PT/speech consults. Patient placed in observation for CVA rule out under the care of hospital medicine.      * No surgery found *       Hospital Course:   Place an observation for " evaluation and treatment of right arm weakness, slurred speech, and gait instability. Symptoms concerning for acute stroke. Initially perform a CT of the head which showed no acute findings. Patient was unable to perform an MRI or MRA due to having an implantable device. Empirically treated for transient ischemic attack versus stroke. Performed an interval CT scan of the head which was also negative. She was evaluated by neurology who assisted with management. Neurology determined that even though we were unable to get positive imaging findings that she should be treated as likely having had an acute stroke.  Physical and occupational therapy evaluated the patient and recommended outpatient therapy. Speech language pathology evaluated the patient and recommended no restrictions. Discharge plan to return home and continue medication plan outpatient physical therapy and follow-up with primary care as needed         11/27/2020  Had chest pain since yesterday it is tight all over chest no associated shortness of breath. Her bp is elevated she cannot get comfortable feels like tightness . Has no leg swelling her speech and vision improved but he rrt sided weakness still evident she is taking her antiplatelet. She has not taken her meds this  Morning. She is not feeling good during the vist the pain has been ongoing since 4 pm yesterday. I gave her a s/l nitro and she improved.      12/14/2020     Here for f/u after hospital admit. She r/o for mi had cardiolite negative for ischemia. Her medical therapy was adjusted taken off gabapentin she has her aches back all over . She is not able to sleep at nite. No further cardiac symptomatology.     6/7/2021  Here for f/u her bp was elevated she ate salt with watermelon has no new complaints of leg swelling shortness of breath tia claudication syncope near syncope no blurred vision. She is only n lopressor half pill bid due yo hypotension/.      11/4/2021   Has been having pnd  over the past 2 weeks she wakes up short of breath has to get up move around. Has also some shortness of breath during the day. She has no palpitation. Has  No leg swelling her bp has been elevated. Has  Been compliant with diet. Has continous chest tightness.has no exertional angina. The chest pain is chronic she had is the reason she was sent top er last year. cardiolite was negative.      11/5/2021   Pacer interrogation showed afib since september 20 nitropach added this morning her bp is elevated had her echo today no labs done .she is on asa and plavix no arb      11/24/2021   ADMITTED TO [facility] MEDICAL TEHRAPY ADJUSTED PLACED ON ELIQUIS DIURTETICS HAD SIGNIFICANT MITRAL REGURGITATION HAD HELGA CARDIOVERSION TO SR HAD CARDIOLITE WAS NEGATIVE    HER CHEST PAIN SHORTNESS OF BREATH RESOLVED SHE IS COMPLIANT WITH MEDS HTN CONTROLLED NO DIZZINESS LIGHTHEADEDNESS.      3/3/2022  Here frof /u no new complaints of chest pain shortness of breath palpitation syncope near syncope compliant with meds. Had falls twice uses her walker. No chf symptoms opr angina.     5/8/2023   haD LEG SWELLING NOTHING IN CHANGE OF EATING HABITS.SHE HAS BEEN EVALUATED WITH NEPHROLOGY HEPATOLOGY AND RHEUMATOLOGY  WAS TOLD IT IS CARDIAC SAW DR EL HER LASIX WAS DOUBLED. SHE LOST 17 LBS SHE FEELS MUCH BETTER LEG SWELLING RESOLVED. SHE HAS NO CHEST PAIN. HER EF IS UNCHANGED. SHE STOPPED LYRICAL.     6/22/2023   Has still some residual exertional shortness her weight has been stable her cardiolite is negative she is in afib since 10/22 she is being followed by ep soon. She is compliant with salt intake.      Update 10/27/2023 :  Continues to have arthritic complaints.  No cardiovascular complaints at this time.  Feels more energetic since pacemaker was implanted.  Still has to mind her sleep schedule whenever she plans to an active day.     I have reviewed the actual image of the ECG tracing obtained today and it shows AF with  complete heart block and his pacing with minimal local nonspecific capture, an apparent HV of 40 milliseconds and clear-cut conduction across the his Purkinje system.     Patient's device (Tri PPM with His lead )was fully evaluated today under my direct supervision and real-time feedback.  Summary of findings are as listed below:  Device is in good repair.   The battery is not near JESSICA.  The his sensing and pacing thresholds are favorable with well maintained safety margins.   In persistent AFib with complete heart block.  There were no device data indicating possible ongoing fluid retention.    Recommendation:  Device follow up as per clinic routine with remote and in house checks        1/4/2024   Here for f/u has multiple social issues selling house lost  logistics of moving seems clinically depressed. Has been having elevated bp was placed on amlodipine has been having leg swelling for 3 weeks before started amlodipine she is not on arb   Has not been taking her nitropach gets intermittent chest pain uses s/l nitro.she is not sure she is taking plavix not taking pravastatin.      4/4/2024  Here for f/u has been taking meds  compliant with diet has been under stress with her selling house and social issues with kids. Denies any other cardiac symptoms.     10/31/2024  Here for f/u no issues clinically cardiovascular wise lot of joint symptoms. Has no chf palpitation tia claudication or angina compliant with meds and diet.  7/17/2025   History of Present Illness    85-year-old female presents today for follow-up of dizziness and dyspnea. She has a complex cardiac history including CAD with prior stent placement, s/p pacemaker placement checked 2 months ago, aortic stenosis, and AF. She reports intermittent RUE chest pain, more noticeable when lying down and remaining still, without specific timing or triggers. She reports progressive dyspnea with exertion, experiencing significant respiratory distress even  "with minimal activity such as walking from the parking lot into the building. This has significantly impacted her functional status. She experiences persistent dizziness characterized by a sensation of head spinning and difficulty maintaining balance, described as feeling like her head is "about to spin around." Her ear doctor has attributed the dizziness to her current medication regimen. She plans to follow up with another provider to investigate potential ocular causes. She has obtained a life alert device due to fall concerns. She has peripheral neuropathy affecting bilateral feet, causing difficulty feeling her feet while walking and contributing to unsteadiness. She has CKD (CKD). She takes Olmesartan, Furosemide, and Eliquis. She is not taking vitamin supplements.      ROS:  General: -fever, -chills, -fatigue, -weight gain, -weight loss  Eyes: -vision changes, -redness, -discharge  ENT: -ear pain, -nasal congestion, -sore throat  Cardiovascular: +chest pain, -palpitations, -lower extremity edema, +exertional dyspnea, +exercise intolerance  Respiratory: -cough, +shortness of breath  Gastrointestinal: -abdominal pain, -nausea, -vomiting, -diarrhea, -constipation, -blood in stool  Genitourinary: -dysuria, -hematuria, -frequency  Musculoskeletal: -joint pain, -muscle pain  Skin: -rash, -lesion  Neurological: -headache, +dizziness, +numbness, -tingling, +balance issues, +falling  Psychiatric: -anxiety, -depression, -sleep difficulty              Past Medical History:   Diagnosis Date    Age-related osteoporosis without current pathological fracture 8/20/2018    Anemia     Anxiety     Arthritis     Atrial flutter     Cancer     lymphoma Large cell B    CHF (congestive heart failure)     Chronic anemia 4/26/2017    Chronic midline low back pain with right-sided sciatica 8/20/2018    Coronary artery disease     01/2015 [facility] patent LCX. 50% stenosis in LAD and RCA.      Depression     Disorder of kidney and ureter     " Encounter for blood transfusion     GERD (gastroesophageal reflux disease)     Gout, arthritis     Heart failure     Hx of psychiatric care     Hyperlipidemia     Hypertension     Hypothyroidism     Immune deficiency disorder     Kidney disease     Lung nodule 2014    RML--stable    Obesity     Pacemaker     Metronic    Paroxysmal atrial fibrillation 3/15/2018    Pneumonia     Polyneuropathy     chemo induced    Psychiatric problem     Rheumatoid arthritis involving multiple sites with positive rheumatoid factor 4/19/2023    Stroke 11/16/2020    Tobacco dependence     quit 1976    Trouble in sleeping     Unstable angina 11/27/2020       Past Surgical History:   Procedure Laterality Date    APPENDECTOMY  1966 approx    CARDIAC PACEMAKER PLACEMENT  01/22/2015    CHOLECYSTECTOMY  1993    incidental at time of gastric bypass    COLON SURGERY Right 2017    hemicolectomy    COLONOSCOPY N/A 4/6/2017    Procedure: COLONOSCOPY;  Surgeon: Tye Enamorado MD;  Location: [facility] ENDO;  Service: Endoscopy;  Laterality: N/A;    COLONOSCOPY N/A 11/28/2018    Procedure: COLONOSCOPY;  Surgeon: Saúl Arthur III, MD;  Location: [facility] ENDO;  Service: Endoscopy;  Laterality: N/A;    CORONARY ANGIOPLASTY  02/2014    CORONARY STENT PLACEMENT  02/05/2014    ESOPHAGOGASTRODUODENOSCOPY N/A 11/28/2018    Procedure: EGD (ESOPHAGOGASTRODUODENOSCOPY);  Surgeon: Saúl Arthur III, MD;  Location: [facility];  Service: Endoscopy;  Laterality: N/A;    GASTRIC BYPASS  1993    with incidental choly    HERNIA REPAIR      IMPLANTATION OF BIVENTRICULAR PERMANENT PACEMAKER AS UPGRADE TO EXISTING PACEMAKER Left 7/13/2023    Procedure: Biventricular pacemaker upgrade/His lead vs CRT-P;  Surgeon: Velasquez Vizcaino MD;  Location: [facility] CATH LAB;  Service: Cardiology;  Laterality: Left;  Will need to upgrade her pacemaker.  Ideally his pacing lead would be the best approach. MDT/ Alternatively, an upgrade /His lead as Plan A  and  CRT if fails/notified MDT  "rep Mell and Arik  NOT MRI safe   Pacer and leads implanted    INJECTION OF ANESTHETIC AGENT INTO SACROILIAC JOINT Right 10/8/2020    Procedure: Right BLOCK, SACROILIAC JOINT with RN IV sedation;  Surgeon: Madi Anton MD;  Location: [facility];  Service: Pain Management;  Laterality: Right;    JOINT REPLACEMENT Bilateral 2009    3 months apart    TONSILLECTOMY  1959    VENOGRAM, CATH LAB Bilateral 6/29/2023    Procedure: Venogram, Cath Lab;  Surgeon: Velasquez Vizcaino MD;  Location: [facility] CATH LAB;  Service: Cardiology;  Laterality: Bilateral;  1:00PM arrival time  NOT MRI safe   Pacer and leads implanted by [employer] 1/22/15   MDTR SEDR01 Sensia, [id number]   A lead: SAMEER 5076 CapSure Fix [employer], [id number]   RV lead: SAMEER 5076 CapSure Fix Novus, [id number]       Social History[1]    Family History   Problem Relation Name Age of Onset    Heart disease Mother      Hypertension Mother      Cataracts Mother      Stomach cancer Father          "ulcers that turned to cancer"    Cancer Father          stomach    Pancreatic cancer Sister      Cancer Sister          pancreatic    Leukemia Brother          "leukemia which led to intestinal cancer"    Cataracts Brother      Cancer Brother          leukemia then later stomach cancer    Drug abuse Son      Cancer Son          prostate cancer    Prostate cancer Son      COPD Daughter      Asthma Daughter      Hypertension Maternal Grandfather      Stroke Maternal Grandfather      Alcohol abuse Neg Hx      Diabetes Neg Hx      Intellectual disability Neg Hx      Mental illness Neg Hx         Current Outpatient Medications   Medication Sig    acetaminophen (TYLENOL ARTHRITIS PAIN) 650 MG TbSR Take 650 mg by mouth every 8 (eight) hours.    allopurinoL (ZYLOPRIM) 300 MG tablet Take 1 tablet (300 mg total) by mouth once daily.    azelastine (ASTELIN) 137 mcg (0.1 %) nasal spray 1 spray (137 mcg total) by Nasal route 2 (two) times daily.    busPIRone (BUSPAR) 7.5 MG tablet " Take 1 tablet (7.5 mg total) by mouth 2 (two) times daily.    calcitRIOL (ROCALTROL) 0.25 MCG Cap TAKE ONE CAPSULE BY MOUTH EVERY MONDAY, WEDNESDAY, AND FRIDAY    clopidogreL (PLAVIX) 75 mg tablet TAKE ONE TABLET BY MOUTH EVERY DAY    diclofenac sodium (VOLTAREN) 1 % Gel Apply 2 g topically once daily. Apply 2 g over painful joints once or twice a day.    DIPRIVAN 10 mg/mL infusion     DULoxetine (CYMBALTA) 30 MG capsule TAKE ONE CAPSULE BY MOUTH EVERY DAY    ELIQUIS 2.5 mg Tab TAKE ONE TABLET BY MOUTH TWICE DAILY    ergocalciferol (ERGOCALCIFEROL) 50,000 unit Cap TAKE ONE CAPSULE BY MOUTH EVERY 7 DAYS    fluticasone propionate (FLONASE) 50 mcg/actuation nasal spray 2 sprays (100 mcg total) by Each Nostril route once daily.    furosemide (LASIX) 40 MG tablet TAKE ONE TABLET (40MG) BY MOUTH EVERY DAY; THEN TAKE TWO TABLETS (80MG TOTAL) EVERY OTHER DAY    hydroxychloroquine (PLAQUENIL) 200 mg tablet Take 1 tablet (200 mg total) by mouth every other day.    iron-vitamin C 100-250 mg, ICAR-C, 100-250 mg Tab TAKE ONE TABLET BY MOUTH EVERY DAY    levocetirizine (XYZAL) 5 MG tablet Take 1 tablet (5 mg total) by mouth every evening.    levothyroxine (SYNTHROID) 112 MCG tablet Take 1 tablet (112 mcg total) by mouth once daily.    metoprolol tartrate (LOPRESSOR) 25 MG tablet Take 1 tablet (25 mg total) by mouth 2 (two) times daily.    olmesartan (BENICAR) 20 MG tablet Take 1 tablet (20 mg total) by mouth once daily.    orphenadrine (NORFLEX) 100 mg tablet Take 1 tablet (100 mg total) by mouth 2 (two) times daily.    pravastatin (PRAVACHOL) 40 MG tablet Take 1 tablet (40 mg total) by mouth once daily.    pregabalin (LYRICA) 50 MG capsule Take 50 mg by mouth 3 (three) times daily.    sertraline (ZOLOFT) 100 MG tablet TAKE 1 & 1/2 TABLETS BY MOUTH EVERY DAY    sodium bicarbonate 650 MG tablet TAKE ONE TABLET BY MOUTH TWICE DAILY    traMADoL (ULTRAM) 50 mg tablet Take 1 tablet (50 mg total) by mouth every 6 (six) hours as needed  for Pain.    tumeric-ging-olive-oreg-capryl 100 mg-150 mg- 50 mg-150 mg Cap Take by mouth.    ZINC ACETATE ORAL Take 250 mg by mouth once daily.    ascorbic acid, vitamin C, (VITAMIN C) 100 MG tablet Take by mouth once daily. (Patient not taking: Reported on 7/17/2025)    glucosamine-D3-Boswellia serr (OSTEO BI-FLEX, 5-LOXIN,) 1,500-400-100 mg-unit-mg Tab Take by mouth. (Patient not taking: Reported on 7/17/2025)    multivitamin (THERAGRAN) per tablet Take 1 tablet by mouth once daily. (Patient not taking: Reported on 7/17/2025)     Current Facility-Administered Medications   Medication    denosumab (PROLIA) injection 60 mg     Current Outpatient Medications on File Prior to Visit   Medication Sig    acetaminophen (TYLENOL ARTHRITIS PAIN) 650 MG TbSR Take 650 mg by mouth every 8 (eight) hours.    allopurinoL (ZYLOPRIM) 300 MG tablet Take 1 tablet (300 mg total) by mouth once daily.    azelastine (ASTELIN) 137 mcg (0.1 %) nasal spray 1 spray (137 mcg total) by Nasal route 2 (two) times daily.    busPIRone (BUSPAR) 7.5 MG tablet Take 1 tablet (7.5 mg total) by mouth 2 (two) times daily.    calcitRIOL (ROCALTROL) 0.25 MCG Cap TAKE ONE CAPSULE BY MOUTH EVERY MONDAY, WEDNESDAY, AND FRIDAY    clopidogreL (PLAVIX) 75 mg tablet TAKE ONE TABLET BY MOUTH EVERY DAY    diclofenac sodium (VOLTAREN) 1 % Gel Apply 2 g topically once daily. Apply 2 g over painful joints once or twice a day.    DIPRIVAN 10 mg/mL infusion     DULoxetine (CYMBALTA) 30 MG capsule TAKE ONE CAPSULE BY MOUTH EVERY DAY    ELIQUIS 2.5 mg Tab TAKE ONE TABLET BY MOUTH TWICE DAILY    ergocalciferol (ERGOCALCIFEROL) 50,000 unit Cap TAKE ONE CAPSULE BY MOUTH EVERY 7 DAYS    fluticasone propionate (FLONASE) 50 mcg/actuation nasal spray 2 sprays (100 mcg total) by Each Nostril route once daily.    furosemide (LASIX) 40 MG tablet TAKE ONE TABLET (40MG) BY MOUTH EVERY DAY; THEN TAKE TWO TABLETS (80MG TOTAL) EVERY OTHER DAY    hydroxychloroquine (PLAQUENIL) 200 mg  tablet Take 1 tablet (200 mg total) by mouth every other day.    iron-vitamin C 100-250 mg, ICAR-C, 100-250 mg Tab TAKE ONE TABLET BY MOUTH EVERY DAY    levocetirizine (XYZAL) 5 MG tablet Take 1 tablet (5 mg total) by mouth every evening.    levothyroxine (SYNTHROID) 112 MCG tablet Take 1 tablet (112 mcg total) by mouth once daily.    metoprolol tartrate (LOPRESSOR) 25 MG tablet Take 1 tablet (25 mg total) by mouth 2 (two) times daily.    olmesartan (BENICAR) 20 MG tablet Take 1 tablet (20 mg total) by mouth once daily.    orphenadrine (NORFLEX) 100 mg tablet Take 1 tablet (100 mg total) by mouth 2 (two) times daily.    pravastatin (PRAVACHOL) 40 MG tablet Take 1 tablet (40 mg total) by mouth once daily.    pregabalin (LYRICA) 50 MG capsule Take 50 mg by mouth 3 (three) times daily.    sertraline (ZOLOFT) 100 MG tablet TAKE 1 & 1/2 TABLETS BY MOUTH EVERY DAY    sodium bicarbonate 650 MG tablet TAKE ONE TABLET BY MOUTH TWICE DAILY    traMADoL (ULTRAM) 50 mg tablet Take 1 tablet (50 mg total) by mouth every 6 (six) hours as needed for Pain.    tumeric-ging-olive-oreg-capryl 100 mg-150 mg- 50 mg-150 mg Cap Take by mouth.    ZINC ACETATE ORAL Take 250 mg by mouth once daily.    ascorbic acid, vitamin C, (VITAMIN C) 100 MG tablet Take by mouth once daily. (Patient not taking: Reported on 7/17/2025)    glucosamine-D3-Boswellia serr (OSTEO BI-FLEX, 5-LOXIN,) 1,500-400-100 mg-unit-mg Tab Take by mouth. (Patient not taking: Reported on 7/17/2025)    multivitamin (THERAGRAN) per tablet Take 1 tablet by mouth once daily. (Patient not taking: Reported on 7/17/2025)     Current Facility-Administered Medications on File Prior to Visit   Medication    denosumab (PROLIA) injection 60 mg     Review of patient's allergies indicates:   Allergen Reactions    Corticosteroids (glucocorticoids) Nausea Only and Other (See Comments)     Stomach pain, dizziness, headache    Oxycodone Other (See Comments)     Blood pressure dropped     "        Objective:     Vitals:    07/17/25 1252 07/17/25 1253   BP: 96/61 95/61   BP Location: Right arm Left arm   Pulse: 74    SpO2: 98%    Weight: 56 kg (123 lb 7.3 oz)    Height: 5' 4" (1.626 m)      Body mass index is 21.19 kg/m².         Physical Exam  Constitutional:       General: She is not in acute distress.     Appearance: Normal appearance. She is well-developed. She is not ill-appearing.   HENT:      Head: Normocephalic and atraumatic.   Eyes:      General: No scleral icterus.     Pupils: Pupils are equal, round, and reactive to light.   Neck:      Thyroid: No thyromegaly.      Vascular: Normal carotid pulses. No carotid bruit, hepatojugular reflux or JVD.      Trachea: No tracheal deviation.   Cardiovascular:      Rate and Rhythm: Normal rate and regular rhythm.      Pulses: Normal pulses.           Carotid pulses are 2+ on the right side and 2+ on the left side.       Radial pulses are 2+ on the right side and 2+ on the left side.        Dorsalis pedis pulses are 2+ on the right side and 2+ on the left side.        Posterior tibial pulses are 2+ on the right side and 2+ on the left side.      Heart sounds: Murmur heard.      Harsh midsystolic murmur is present with a grade of 2/6 at the upper right sternal border radiating to the neck.      No friction rub. No gallop.   Pulmonary:      Effort: Pulmonary effort is normal. No respiratory distress.      Breath sounds: Normal breath sounds. No wheezing, rhonchi or rales.      Comments: Pacer site well healed.  Chest:      Chest wall: No tenderness.   Abdominal:      General: Bowel sounds are normal. There is no abdominal bruit.      Palpations: Abdomen is soft. There is no hepatomegaly or pulsatile mass.      Tenderness: There is no abdominal tenderness.   Musculoskeletal:      Right shoulder: No deformity.      Cervical back: Normal range of motion and neck supple.      Comments: Scar BILATERAL  KNEE REPLACEMENT.    Skin:     General: Skin is warm and " dry.      Findings: No erythema or rash.      Nails: There is no clubbing.   Neurological:      Mental Status: She is alert and oriented to person, place, and time.      Cranial Nerves: No cranial nerve deficit.      Coordination: Coordination normal.   Psychiatric:         Speech: Speech normal.         Behavior: Behavior normal.       Lab Results   Component Value Date    CHOL 117 (L) 05/07/2025    CHOL 125 01/25/2025    CHOL 116 (L) 06/19/2024     Lab Results   Component Value Date    HGBA1C 4.9 11/16/2020      BMP  Lab Results   Component Value Date     05/07/2025    K 4.0 05/07/2025     (H) 05/07/2025    CO2 23 05/07/2025    BUN 25 (H) 05/07/2025    CREATININE 1.2 05/07/2025    CALCIUM 8.9 05/07/2025    ANIONGAP 10 05/07/2025    EGFRNORACEVR 45 (L) 05/07/2025      Lab Results   Component Value Date    HDL 58 05/07/2025    HDL 56 01/25/2025    HDL 62 06/19/2024     Lab Results   Component Value Date    LDLCALC 40.8 (L) 05/07/2025    LDLCALC 47.6 (L) 01/25/2025     Lab Results   Component Value Date    TRIG 91 05/07/2025    TRIG 107 01/25/2025    TRIG 68 06/19/2024     Lab Results   Component Value Date    CHOLHDL 49.6 05/07/2025    CHOLHDL 44.8 01/25/2025    CHOLHDL 53.4 (H) 06/19/2024       Chemistry        Component Value Date/Time     05/07/2025 1215     01/25/2025 1205    K 4.0 05/07/2025 1215    K 4.4 01/25/2025 1205     (H) 05/07/2025 1215     (H) 01/25/2025 1205    CO2 23 05/07/2025 1215    CO2 17 (L) 01/25/2025 1205    BUN 25 (H) 05/07/2025 1215    CREATININE 1.2 05/07/2025 1215    GLU 94 05/07/2025 1215    GLU 91 01/25/2025 1205        Component Value Date/Time    CALCIUM 8.9 05/07/2025 1215    CALCIUM 8.3 (L) 01/25/2025 1205    ALKPHOS 51 05/07/2025 1215    ALKPHOS 46 01/25/2025 1205    AST 51 (H) 05/07/2025 1215    AST 32 01/25/2025 1205    ALT 38 05/07/2025 1215    ALT 26 01/25/2025 1205    BILITOT 0.3 05/07/2025 1215    BILITOT 0.4 01/25/2025 1205    ESTGFRAFRICA  35 (A) 05/31/2022 0909    EGFRNONAA 30 (A) 05/31/2022 0909          Lab Results   Component Value Date    TSH 6.633 (H) 01/25/2025     Lab Results   Component Value Date    INR 1.0 06/23/2023    INR 1.0 04/03/2023    INR 0.9 11/07/2021     Lab Results   Component Value Date    WBC 7.68 12/16/2024    HGB 12.1 12/16/2024    HCT 37.6 12/16/2024     (H) 12/16/2024     12/16/2024     BMP  Sodium   Date Value Ref Range Status   05/07/2025 144 136 - 145 mmol/L Final   01/25/2025 141 136 - 145 mmol/L Final     Potassium   Date Value Ref Range Status   05/07/2025 4.0 3.5 - 5.1 mmol/L Final   01/25/2025 4.4 3.5 - 5.1 mmol/L Final     Chloride   Date Value Ref Range Status   05/07/2025 111 (H) 95 - 110 mmol/L Final   01/25/2025 113 (H) 95 - 110 mmol/L Final     CO2   Date Value Ref Range Status   05/07/2025 23 23 - 29 mmol/L Final   01/25/2025 17 (L) 23 - 29 mmol/L Final     BUN   Date Value Ref Range Status   05/07/2025 25 (H) 8 - 23 mg/dL Final     Creatinine   Date Value Ref Range Status   05/07/2025 1.2 0.5 - 1.4 mg/dL Final     Calcium   Date Value Ref Range Status   05/07/2025 8.9 8.7 - 10.5 mg/dL Final   01/25/2025 8.3 (L) 8.7 - 10.5 mg/dL Final     Anion Gap   Date Value Ref Range Status   05/07/2025 10 8 - 16 mmol/L Final     eGFR if    Date Value Ref Range Status   05/31/2022 35 (A) >60 mL/min/1.73 m^2 Final     eGFR if non    Date Value Ref Range Status   05/31/2022 30 (A) >60 mL/min/1.73 m^2 Final     Comment:     Calculation used to obtain the estimated glomerular filtration  rate (eGFR) is the CKD-EPI equation.        CrCl cannot be calculated (Patient's most recent lab result is older than the maximum 7 days allowed.).  Summary  Show Result Comparison     Left Ventricle: The left ventricle is normal in size. Mildly increased ventricular mass. Mildly increased wall thickness. There is mild concentric hypertrophy. There is low normal systolic function with a visually  estimated ejection fraction of 50 - 55%. Grade III diastolic dysfunction. Elevated left ventricular filling pressure.    Right Ventricle: The right ventricle is normal in size Wall thickness is normal. Systolic function is borderline low. Pacemaker lead present in the ventricle.    Left Atrium: The left atrium is mildly dilated    Right Atrium: The right atrium is mildly dilated .    Aortic Valve: There is moderate aortic valve sclerosis. Moderately restricted motion. There is moderate to severe stenosis. Aortic valve area by VTI is 0.8 cm². Aortic valve peak velocity is 2.4 m/s. Mean gradient is 12 mmHg. The dimensionless index is 0.26.    Mitral Valve: Mildly calcified leaflets. There is moderate mitral annular calcification. There is mild regurgitation.    Tricuspid Valve: There is mild regurgitation.    Pulmonary Artery: There is mild pulmonary hypertension. The estimated pulmonary artery systolic pressure is 44 mmHg.    IVC/SVC: Normal venous pressure at 3 mmHg.  Assessment:     1. Permanent atrial fibrillation    2. Essential hypertension    3. Primary osteoarthritis involving multiple joints    4. Coronary artery disease : multiple vessels, s/p PTCA    5. S/P gastric bypass    6. Chronic anemia    7. Atherosclerosis of aorta    8. Cardiac pacemaker in situ    9. Pedal edema    10. Rheumatoid arthritis involving multiple sites with positive rheumatoid factor    11. Atherosclerosis of native coronary artery of native heart with stable angina pectoris    12. Bilateral lower extremity edema    13. Mixed hyperlipidemia    14. Ischemic cardiomyopathy    15. Stage 4 chronic kidney disease    16. Thyroid disorder        Plan:     Assessment & Plan    I25.119 Atherosclerotic heart disease of native coronary artery with unspecified angina pectoris  I35.0 Nonrheumatic aortic (valve) stenosis  I48.91 Unspecified atrial fibrillation  I95.9 Hypotension, unspecified  R07.89 Other chest pain  R06.02 Shortness of breath  R42  Dizziness and giddiness  G60.9 Hereditary and idiopathic neuropathy, unspecified  N18.9 Chronic kidney disease, unspecified  Z95.0 Presence of cardiac pacemaker  Z95.5 Presence of coronary angioplasty implant and graft    IMPRESSION:  Considered potential autonomic insufficiency contributing to symptoms.    PLAN SUMMARY:  - Lowered Olmesartan dose to 10 mg daily  - Continued Furosemide (Lasix) at current dose  - Ordered cardiac catheterization  - Continued Eliquis at current dose  - Ordered thyroid function tests    ATHEROSCLEROTIC HEART DISEASE AND AORTIC STENOSIS:  - Ordered cardiac catheterization to evaluate for coronary artery stenosis given symptoms and narrow aortic valve; explained conscious sedation for procedure.    ATRIAL FIBRILLATION:  - Continued Eliquis at current dose.    HYPOTENSION AND DIZZINESS:  - Lowered Olmesartan from 20 mg to 10 mg daily to address dizziness and low blood pressure (patient instructed to break pill in half).    CHRONIC KIDNEY DISEASE:  - Continued Furosemide (Lasix) at current dose.    FOLLOW-UP:  - Ordered thyroid function tests due to previous elevated results.          Patient ahs constellation of symptoms of exertional fatigue dyspnea shortness of breath decrease in exercise tolerance in setting of cad s/p lad stent moderate to severe aortic stenosis will evaluate with r/lhc.   This note was generated with the assistance of ambient listening technology. Verbal consent was obtained by the patient and accompanying visitor(s) for the recording of patient appointment to facilitate this note. I attest to having reviewed and edited the generated note for accuracy, though some syntax or spelling errors may persist. Please contact the author of this note for any clarification.            [1]   Social History  Tobacco Use    Smoking status: Former     Current packs/day: 0.00     Average packs/day: 2.0 packs/day for 6.0 years (12.0 ttl pk-yrs)     Types: Cigarettes     Start date:  3/15/1970     Quit date: 3/15/1976     Years since quittin.3    Smokeless tobacco: Never   Substance Use Topics    Alcohol use: No     Alcohol/week: 0.0 standard drinks of alcohol    Drug use: No

## 2025-07-25 ENCOUNTER — LAB VISIT (OUTPATIENT)
Dept: LAB | Facility: HOSPITAL | Age: 85
End: 2025-07-25
Attending: INTERNAL MEDICINE
Payer: MEDICARE

## 2025-07-25 DIAGNOSIS — E03.9 HYPOTHYROIDISM (ACQUIRED): ICD-10-CM

## 2025-07-25 DIAGNOSIS — E55.9 VITAMIN D DEFICIENCY: ICD-10-CM

## 2025-07-25 DIAGNOSIS — E07.9 THYROID DISORDER: ICD-10-CM

## 2025-07-25 DIAGNOSIS — Z98.61 S/P PTCA (PERCUTANEOUS TRANSLUMINAL CORONARY ANGIOPLASTY): ICD-10-CM

## 2025-07-25 DIAGNOSIS — I25.5 ISCHEMIC CARDIOMYOPATHY: ICD-10-CM

## 2025-07-25 DIAGNOSIS — I10 ESSENTIAL HYPERTENSION: ICD-10-CM

## 2025-07-25 DIAGNOSIS — R06.02 SOB (SHORTNESS OF BREATH): ICD-10-CM

## 2025-07-25 DIAGNOSIS — I25.10 CAD, MULTIPLE VESSEL: ICD-10-CM

## 2025-07-25 DIAGNOSIS — I50.22 CHRONIC SYSTOLIC CONGESTIVE HEART FAILURE: ICD-10-CM

## 2025-07-25 DIAGNOSIS — Z95.0 CARDIAC PACEMAKER IN SITU: ICD-10-CM

## 2025-07-25 DIAGNOSIS — R60.0 BILATERAL LOWER EXTREMITY EDEMA: ICD-10-CM

## 2025-07-25 DIAGNOSIS — E78.2 MIXED HYPERLIPIDEMIA: ICD-10-CM

## 2025-07-25 LAB
25(OH)D3+25(OH)D2 SERPL-MCNC: 18 NG/ML (ref 30–96)
ABSOLUTE EOSINOPHIL (OHS): 0.09 K/UL
ABSOLUTE MONOCYTE (OHS): 0.62 K/UL (ref 0.3–1)
ABSOLUTE NEUTROPHIL COUNT (OHS): 3.47 K/UL (ref 1.8–7.7)
ALBUMIN SERPL BCP-MCNC: 3.9 G/DL (ref 3.5–5.2)
ALP SERPL-CCNC: 49 UNIT/L (ref 40–150)
ALT SERPL W/O P-5'-P-CCNC: 35 UNIT/L (ref 0–55)
ANION GAP (OHS): 15 MMOL/L (ref 8–16)
APTT PPP: 30.7 SECONDS (ref 21–32)
AST SERPL-CCNC: 38 UNIT/L (ref 0–50)
BASOPHILS # BLD AUTO: 0.04 K/UL
BASOPHILS NFR BLD AUTO: 0.5 %
BILIRUB SERPL-MCNC: 0.3 MG/DL (ref 0.1–1)
BUN SERPL-MCNC: 82 MG/DL (ref 8–23)
CALCIUM SERPL-MCNC: 8.1 MG/DL (ref 8.7–10.5)
CHLORIDE SERPL-SCNC: 113 MMOL/L (ref 95–110)
CHOLEST SERPL-MCNC: 91 MG/DL (ref 120–199)
CHOLEST/HDLC SERPL: 1.9 {RATIO} (ref 2–5)
CO2 SERPL-SCNC: 10 MMOL/L (ref 23–29)
CREAT SERPL-MCNC: 4.1 MG/DL (ref 0.5–1.4)
ERYTHROCYTE [DISTWIDTH] IN BLOOD BY AUTOMATED COUNT: 16.6 % (ref 11.5–14.5)
GFR SERPLBLD CREATININE-BSD FMLA CKD-EPI: 10 ML/MIN/1.73/M2
GLUCOSE SERPL-MCNC: 82 MG/DL (ref 70–110)
HCT VFR BLD AUTO: 34.6 % (ref 37–48.5)
HDLC SERPL-MCNC: 47 MG/DL (ref 40–75)
HDLC SERPL: 51.6 % (ref 20–50)
HGB BLD-MCNC: 11 GM/DL (ref 12–16)
IMM GRANULOCYTES # BLD AUTO: 0.02 K/UL (ref 0–0.04)
IMM GRANULOCYTES NFR BLD AUTO: 0.2 % (ref 0–0.5)
INR PPP: 1.1 (ref 0.8–1.2)
LDLC SERPL CALC-MCNC: 21.2 MG/DL (ref 63–159)
LYMPHOCYTES # BLD AUTO: 4.18 K/UL (ref 1–4.8)
MCH RBC QN AUTO: 33.8 PG (ref 27–31)
MCHC RBC AUTO-ENTMCNC: 31.8 G/DL (ref 32–36)
MCV RBC AUTO: 107 FL (ref 82–98)
NONHDLC SERPL-MCNC: 44 MG/DL
NT-PROBNP SERPL-MCNC: ABNORMAL PG/ML
NUCLEATED RBC (/100WBC) (OHS): 0 /100 WBC
PLATELET # BLD AUTO: 182 K/UL (ref 150–450)
PMV BLD AUTO: 11.2 FL (ref 9.2–12.9)
POTASSIUM SERPL-SCNC: 4 MMOL/L (ref 3.5–5.1)
PROT SERPL-MCNC: 6.7 GM/DL (ref 6–8.4)
PROTHROMBIN TIME: 11.6 SECONDS (ref 9–12.5)
RBC # BLD AUTO: 3.25 M/UL (ref 4–5.4)
RELATIVE EOSINOPHIL (OHS): 1.1 %
RELATIVE LYMPHOCYTE (OHS): 49.6 % (ref 18–48)
RELATIVE MONOCYTE (OHS): 7.4 % (ref 4–15)
RELATIVE NEUTROPHIL (OHS): 41.2 % (ref 38–73)
SODIUM SERPL-SCNC: 138 MMOL/L (ref 136–145)
T4 FREE SERPL-MCNC: 0.78 NG/DL (ref 0.71–1.51)
TRIGL SERPL-MCNC: 114 MG/DL (ref 30–150)
TSH SERPL-ACNC: 3.58 UIU/ML (ref 0.4–4)
WBC # BLD AUTO: 8.42 K/UL (ref 3.9–12.7)

## 2025-07-25 PROCEDURE — 84439 ASSAY OF FREE THYROXINE: CPT | Mod: HCNC

## 2025-07-25 PROCEDURE — 83880 ASSAY OF NATRIURETIC PEPTIDE: CPT | Mod: HCNC

## 2025-07-25 PROCEDURE — 82040 ASSAY OF SERUM ALBUMIN: CPT | Mod: HCNC

## 2025-07-25 PROCEDURE — 85610 PROTHROMBIN TIME: CPT | Mod: HCNC

## 2025-07-25 PROCEDURE — 36415 COLL VENOUS BLD VENIPUNCTURE: CPT | Mod: HCNC

## 2025-07-25 PROCEDURE — 85730 THROMBOPLASTIN TIME PARTIAL: CPT | Mod: HCNC

## 2025-07-25 PROCEDURE — 85025 COMPLETE CBC W/AUTO DIFF WBC: CPT | Mod: HCNC

## 2025-07-25 PROCEDURE — 82465 ASSAY BLD/SERUM CHOLESTEROL: CPT | Mod: HCNC

## 2025-07-25 PROCEDURE — 82306 VITAMIN D 25 HYDROXY: CPT | Mod: HCNC

## 2025-07-25 PROCEDURE — 84443 ASSAY THYROID STIM HORMONE: CPT | Mod: HCNC

## 2025-07-28 ENCOUNTER — HOSPITAL ENCOUNTER (INPATIENT)
Facility: HOSPITAL | Age: 85
LOS: 3 days | Discharge: HOME HEALTH | DRG: 683 | End: 2025-08-01
Attending: EMERGENCY MEDICINE | Admitting: FAMILY MEDICINE
Payer: MEDICARE

## 2025-07-28 ENCOUNTER — DOCUMENTATION ONLY (OUTPATIENT)
Dept: CARDIOLOGY | Facility: CLINIC | Age: 85
End: 2025-07-28
Payer: MEDICARE
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
## 2025-07-28 DIAGNOSIS — N30.00 ACUTE CYSTITIS WITHOUT HEMATURIA: ICD-10-CM

## 2025-07-28 DIAGNOSIS — E86.1 INTRAVASCULAR VOLUME DEPLETION: ICD-10-CM

## 2025-07-28 DIAGNOSIS — I48.20 CHRONIC ATRIAL FIBRILLATION: ICD-10-CM

## 2025-07-28 DIAGNOSIS — N17.9 ACUTE RENAL FAILURE SUPERIMPOSED ON CHRONIC KIDNEY DISEASE, UNSPECIFIED ACUTE RENAL FAILURE TYPE, UNSPECIFIED CKD STAGE: ICD-10-CM

## 2025-07-28 DIAGNOSIS — N18.9 ACUTE RENAL FAILURE SUPERIMPOSED ON CHRONIC KIDNEY DISEASE, UNSPECIFIED ACUTE RENAL FAILURE TYPE, UNSPECIFIED CKD STAGE: ICD-10-CM

## 2025-07-28 DIAGNOSIS — N18.4 ACUTE RENAL FAILURE SUPERIMPOSED ON STAGE 4 CHRONIC KIDNEY DISEASE, UNSPECIFIED ACUTE RENAL FAILURE TYPE: Primary | ICD-10-CM

## 2025-07-28 DIAGNOSIS — N17.9 ACUTE RENAL FAILURE SUPERIMPOSED ON STAGE 4 CHRONIC KIDNEY DISEASE, UNSPECIFIED ACUTE RENAL FAILURE TYPE: Primary | ICD-10-CM

## 2025-07-28 PROBLEM — E83.39 HYPERPHOSPHATEMIA: Status: ACTIVE | Noted: 2025-07-28

## 2025-07-28 LAB
ABSOLUTE EOSINOPHIL (OHS): 0.04 K/UL
ABSOLUTE MONOCYTE (OHS): 0.48 K/UL (ref 0.3–1)
ABSOLUTE NEUTROPHIL COUNT (OHS): 3.09 K/UL (ref 1.8–7.7)
ALBUMIN SERPL BCP-MCNC: 3.9 G/DL (ref 3.5–5.2)
ALP SERPL-CCNC: 49 UNIT/L (ref 40–150)
ALT SERPL W/O P-5'-P-CCNC: 29 UNIT/L (ref 10–44)
ANION GAP (OHS): 16 MMOL/L (ref 8–16)
AST SERPL-CCNC: 54 UNIT/L (ref 11–45)
BACTERIA #/AREA URNS AUTO: NORMAL /HPF
BASOPHILS # BLD AUTO: 0.03 K/UL
BASOPHILS NFR BLD AUTO: 0.5 %
BILIRUB SERPL-MCNC: 0.4 MG/DL (ref 0.1–1)
BILIRUB UR QL STRIP.AUTO: NEGATIVE
BUN SERPL-MCNC: 83 MG/DL (ref 8–23)
CALCIUM SERPL-MCNC: 7.6 MG/DL (ref 8.7–10.5)
CHLORIDE SERPL-SCNC: 116 MMOL/L (ref 95–110)
CLARITY UR: CLEAR
CO2 SERPL-SCNC: 11 MMOL/L (ref 23–29)
COLOR UR AUTO: YELLOW
CREAT SERPL-MCNC: 3.5 MG/DL (ref 0.5–1.4)
ERYTHROCYTE [DISTWIDTH] IN BLOOD BY AUTOMATED COUNT: 16.4 % (ref 11.5–14.5)
GFR SERPLBLD CREATININE-BSD FMLA CKD-EPI: 12 ML/MIN/1.73/M2
GLUCOSE SERPL-MCNC: 88 MG/DL (ref 70–110)
GLUCOSE UR QL STRIP: NEGATIVE
HCT VFR BLD AUTO: 33.3 % (ref 37–48.5)
HGB BLD-MCNC: 11 GM/DL (ref 12–16)
HGB UR QL STRIP: ABNORMAL
IMM GRANULOCYTES # BLD AUTO: 0.02 K/UL (ref 0–0.04)
IMM GRANULOCYTES NFR BLD AUTO: 0.3 % (ref 0–0.5)
KETONES UR QL STRIP: NEGATIVE
LACTATE SERPL-SCNC: 0.8 MMOL/L (ref 0.5–2.2)
LEUKOCYTE ESTERASE UR QL STRIP: ABNORMAL
LYMPHOCYTES # BLD AUTO: 2.32 K/UL (ref 1–4.8)
MAGNESIUM SERPL-MCNC: 2 MG/DL (ref 1.6–2.6)
MCH RBC QN AUTO: 34.1 PG (ref 27–31)
MCHC RBC AUTO-ENTMCNC: 33 G/DL (ref 32–36)
MCV RBC AUTO: 103 FL (ref 82–98)
MICROSCOPIC COMMENT: NORMAL
NITRITE UR QL STRIP: NEGATIVE
NUCLEATED RBC (/100WBC) (OHS): 0 /100 WBC
PH UR STRIP: 6 [PH]
PHOSPHATE SERPL-MCNC: 8.9 MG/DL (ref 2.7–4.5)
PLATELET # BLD AUTO: 154 K/UL (ref 150–450)
PMV BLD AUTO: 10.4 FL (ref 9.2–12.9)
POTASSIUM SERPL-SCNC: 3.5 MMOL/L (ref 3.5–5.1)
PROT SERPL-MCNC: 6.8 GM/DL (ref 6–8.4)
PROT UR QL STRIP: ABNORMAL
RBC # BLD AUTO: 3.23 M/UL (ref 4–5.4)
RBC #/AREA URNS AUTO: 1 /HPF (ref 0–4)
RELATIVE EOSINOPHIL (OHS): 0.7 %
RELATIVE LYMPHOCYTE (OHS): 38.8 % (ref 18–48)
RELATIVE MONOCYTE (OHS): 8 % (ref 4–15)
RELATIVE NEUTROPHIL (OHS): 51.7 % (ref 38–73)
SODIUM SERPL-SCNC: 143 MMOL/L (ref 136–145)
SP GR UR STRIP: 1.01
SQUAMOUS #/AREA URNS AUTO: 3 /HPF
UROBILINOGEN UR STRIP-ACNC: NEGATIVE EU/DL
WBC # BLD AUTO: 5.98 K/UL (ref 3.9–12.7)
WBC #/AREA URNS AUTO: 3 /HPF (ref 0–5)

## 2025-07-28 PROCEDURE — G0378 HOSPITAL OBSERVATION PER HR: HCPCS | Mod: HCNC

## 2025-07-28 PROCEDURE — 96361 HYDRATE IV INFUSION ADD-ON: CPT | Mod: HCNC

## 2025-07-28 PROCEDURE — 93010 ELECTROCARDIOGRAM REPORT: CPT | Mod: HCNC,,, | Performed by: INTERNAL MEDICINE

## 2025-07-28 PROCEDURE — 96361 HYDRATE IV INFUSION ADD-ON: CPT

## 2025-07-28 PROCEDURE — 93005 ELECTROCARDIOGRAM TRACING: CPT | Mod: HCNC

## 2025-07-28 PROCEDURE — 96374 THER/PROPH/DIAG INJ IV PUSH: CPT | Mod: HCNC

## 2025-07-28 PROCEDURE — 63600175 PHARM REV CODE 636 W HCPCS: Mod: HCNC | Performed by: EMERGENCY MEDICINE

## 2025-07-28 PROCEDURE — 84460 ALANINE AMINO (ALT) (SGPT): CPT | Mod: HCNC | Performed by: REGISTERED NURSE

## 2025-07-28 PROCEDURE — 81001 URINALYSIS AUTO W/SCOPE: CPT | Mod: HCNC | Performed by: EMERGENCY MEDICINE

## 2025-07-28 PROCEDURE — 83605 ASSAY OF LACTIC ACID: CPT | Mod: HCNC | Performed by: EMERGENCY MEDICINE

## 2025-07-28 PROCEDURE — 25000003 PHARM REV CODE 250: Mod: HCNC | Performed by: EMERGENCY MEDICINE

## 2025-07-28 PROCEDURE — 99285 EMERGENCY DEPT VISIT HI MDM: CPT | Mod: 25,HCNC

## 2025-07-28 PROCEDURE — 83735 ASSAY OF MAGNESIUM: CPT | Mod: HCNC | Performed by: EMERGENCY MEDICINE

## 2025-07-28 PROCEDURE — 84100 ASSAY OF PHOSPHORUS: CPT | Mod: HCNC | Performed by: EMERGENCY MEDICINE

## 2025-07-28 PROCEDURE — 25000003 PHARM REV CODE 250: Mod: HCNC | Performed by: NURSE PRACTITIONER

## 2025-07-28 PROCEDURE — 85025 COMPLETE CBC W/AUTO DIFF WBC: CPT | Mod: HCNC | Performed by: REGISTERED NURSE

## 2025-07-28 RX ORDER — TRAMADOL HYDROCHLORIDE 50 MG/1
50 TABLET, FILM COATED ORAL EVERY 12 HOURS PRN
Status: DISCONTINUED | OUTPATIENT
Start: 2025-07-28 | End: 2025-08-01 | Stop reason: HOSPADM

## 2025-07-28 RX ORDER — SODIUM CHLORIDE 9 MG/ML
INJECTION, SOLUTION INTRAVENOUS CONTINUOUS
Status: DISCONTINUED | OUTPATIENT
Start: 2025-07-28 | End: 2025-07-30

## 2025-07-28 RX ORDER — SODIUM CHLORIDE 0.9 % (FLUSH) 0.9 %
10 SYRINGE (ML) INJECTION
Status: DISCONTINUED | OUTPATIENT
Start: 2025-07-28 | End: 2025-08-01 | Stop reason: HOSPADM

## 2025-07-28 RX ORDER — ACETAMINOPHEN 325 MG/1
650 TABLET ORAL EVERY 8 HOURS PRN
Status: DISCONTINUED | OUTPATIENT
Start: 2025-07-28 | End: 2025-08-01 | Stop reason: HOSPADM

## 2025-07-28 RX ORDER — SERTRALINE HYDROCHLORIDE 50 MG/1
150 TABLET, FILM COATED ORAL DAILY
Status: DISCONTINUED | OUTPATIENT
Start: 2025-07-28 | End: 2025-08-01 | Stop reason: HOSPADM

## 2025-07-28 RX ORDER — CEFTRIAXONE 1 G/1
1 INJECTION, POWDER, FOR SOLUTION INTRAMUSCULAR; INTRAVENOUS
Status: COMPLETED | OUTPATIENT
Start: 2025-07-28 | End: 2025-07-28

## 2025-07-28 RX ORDER — LEVOTHYROXINE SODIUM 112 UG/1
112 TABLET ORAL
Status: DISCONTINUED | OUTPATIENT
Start: 2025-07-29 | End: 2025-08-01 | Stop reason: HOSPADM

## 2025-07-28 RX ORDER — CLOPIDOGREL BISULFATE 75 MG/1
75 TABLET ORAL DAILY
Status: DISCONTINUED | OUTPATIENT
Start: 2025-07-29 | End: 2025-08-01 | Stop reason: HOSPADM

## 2025-07-28 RX ORDER — SODIUM CHLORIDE 9 MG/ML
1000 INJECTION, SOLUTION INTRAVENOUS
Status: COMPLETED | OUTPATIENT
Start: 2025-07-28 | End: 2025-07-28

## 2025-07-28 RX ORDER — HYDROXYCHLOROQUINE SULFATE 200 MG/1
200 TABLET, FILM COATED ORAL EVERY OTHER DAY
Status: DISCONTINUED | OUTPATIENT
Start: 2025-07-29 | End: 2025-08-01 | Stop reason: HOSPADM

## 2025-07-28 RX ORDER — CALCITRIOL 0.25 UG/1
0.25 CAPSULE ORAL
Status: DISCONTINUED | OUTPATIENT
Start: 2025-07-28 | End: 2025-08-01 | Stop reason: HOSPADM

## 2025-07-28 RX ORDER — SODIUM BICARBONATE 650 MG/1
650 TABLET ORAL 2 TIMES DAILY
Status: DISCONTINUED | OUTPATIENT
Start: 2025-07-28 | End: 2025-08-01 | Stop reason: HOSPADM

## 2025-07-28 RX ORDER — PRAVASTATIN SODIUM 20 MG/1
40 TABLET ORAL DAILY
Status: DISCONTINUED | OUTPATIENT
Start: 2025-07-28 | End: 2025-08-01 | Stop reason: HOSPADM

## 2025-07-28 RX ORDER — ONDANSETRON HYDROCHLORIDE 2 MG/ML
4 INJECTION, SOLUTION INTRAVENOUS EVERY 8 HOURS PRN
Status: DISCONTINUED | OUTPATIENT
Start: 2025-07-28 | End: 2025-08-01 | Stop reason: HOSPADM

## 2025-07-28 RX ORDER — METOPROLOL TARTRATE 25 MG/1
25 TABLET, FILM COATED ORAL 2 TIMES DAILY
Status: DISCONTINUED | OUTPATIENT
Start: 2025-07-28 | End: 2025-08-01 | Stop reason: HOSPADM

## 2025-07-28 RX ORDER — CEFTRIAXONE 1 G/1
1 INJECTION, POWDER, FOR SOLUTION INTRAMUSCULAR; INTRAVENOUS
Status: DISCONTINUED | OUTPATIENT
Start: 2025-07-29 | End: 2025-07-30

## 2025-07-28 RX ADMIN — SODIUM CHLORIDE: 9 INJECTION, SOLUTION INTRAVENOUS at 02:07

## 2025-07-28 RX ADMIN — APIXABAN 2.5 MG: 2.5 TABLET, FILM COATED ORAL at 09:07

## 2025-07-28 RX ADMIN — SERTRALINE HYDROCHLORIDE 150 MG: 50 TABLET ORAL at 02:07

## 2025-07-28 RX ADMIN — SODIUM CHLORIDE: 9 INJECTION, SOLUTION INTRAVENOUS at 08:07

## 2025-07-28 RX ADMIN — CEFTRIAXONE 1 G: 1 INJECTION, POWDER, FOR SOLUTION INTRAMUSCULAR; INTRAVENOUS at 12:07

## 2025-07-28 RX ADMIN — PRAVASTATIN SODIUM 40 MG: 20 TABLET ORAL at 02:07

## 2025-07-28 RX ADMIN — SODIUM BICARBONATE 650 MG: 650 TABLET ORAL at 09:07

## 2025-07-28 RX ADMIN — SODIUM CHLORIDE 500 ML: 9 INJECTION, SOLUTION INTRAVENOUS at 11:07

## 2025-07-28 RX ADMIN — SODIUM CHLORIDE 1000 ML: 9 INJECTION, SOLUTION INTRAVENOUS at 12:07

## 2025-07-28 RX ADMIN — METOPROLOL TARTRATE 25 MG: 25 TABLET, FILM COATED ORAL at 09:07

## 2025-07-28 RX ADMIN — CALCITRIOL CAPSULES 0.25 MCG 0.25 MCG: 0.25 CAPSULE ORAL at 02:07

## 2025-07-28 NOTE — ASSESSMENT & PLAN NOTE
Lab Results   Component Value Date    TSH 3.585 07/25/2025    TSH 6.633 (H) 01/25/2025    TSH 5.875 (H) 06/19/2024      -Continue Levothyroxine

## 2025-07-28 NOTE — NURSING TRANSFER
Nursing Transfer Note      7/28/2025   3:59 PM    Nurse giving handoff: ED- Wendy   Nurse receiving handoff:nAu    Reason patient is being transferred: admit for observation    Transfer To: [id number]    Transfer via stretcher    Transfer with cardiac monitoring    Transported by Wendy ED nurse    Transfer Vital Signs:  Blood Pressure:144/66  Heart Rate:60  O2:98  Temperature:96.6  Respirations:14    Telemetry: Box Number [id number], Rate 60, Rhythm Paced, and Telemetry  Elise  Order for Tele Monitor? Yes    Additional Lines: n/a    Medicines sent: Fluids    Any special needs or follow-up needed: no    Patient belongings transferred with patient: Yes    Chart send with patient: No    Notified: son at bedside    Patient reassessed at: 07/28/25 1345 (date, time)  1  Upon arrival to floor: cardiac monitor applied, patient oriented to room, call bell in reach, and bed in lowest position

## 2025-07-28 NOTE — ED PROVIDER NOTES
"SCRIBE #1 NOTE: I, Maitefranco Marsh, am scribing for, and in the presence of, Michelle De La Torre MD. I have scribed the entire note.       History     Chief Complaint   Patient presents with    Abnormal Labs     Pt. Was sent by her Dr. For abnormal kidney function. Labs were done on 7/25.     Review of patient's allergies indicates:   Allergen Reactions    Corticosteroids (glucocorticoids) Nausea Only and Other (See Comments)     Stomach pain, dizziness, headache    Oxycodone Other (See Comments)     Blood pressure dropped         History of Present Illness     HPI    7/28/2025, 10:31 AM  History obtained from the patient and medical records      History of Present Illness: Violet Swenson is a 85 y.o. female patient with a PMHx of HTN, HLD, hypothyroidism, CAD, depression, arthritis, GERD, gout, heart failure, anxiety, obesity, tobacco dependence, immune deficiency disorder, cancer, PAF, chronic anemia, kidney disease, and stroke who was sent to the Emergency Department by her doctor for abnormal kidney function following labs on 7/25. She notes she is drinking fluids and eating as normal but states she has been "bone-dry". Symptoms are constant and moderate in severity. No mitigating or exacerbating factors reported. Patient denies any N/V/D or trouble urinating.  No prior Tx specified.  Pt states she had a heart cath scheduled for today for CP but could not have it due to her abnormal labs. No further complaints or concerns at this time.       Arrival mode: Personal Transportation    PCP: Marti Coronel MD        Past Medical History:  Past Medical History:   Diagnosis Date    Age-related osteoporosis without current pathological fracture 8/20/2018    Anemia     Anxiety     Arthritis     Atrial flutter     Cancer     lymphoma Large cell B    CHF (congestive heart failure)     Chronic anemia 4/26/2017    Chronic midline low back pain with right-sided sciatica 8/20/2018    Coronary artery disease     " 01/2015 [facility] patent LCX. 50% stenosis in LAD and RCA.      Depression     Disorder of kidney and ureter     Encounter for blood transfusion     GERD (gastroesophageal reflux disease)     Gout, arthritis     Heart failure     Hx of psychiatric care     Hyperlipidemia     Hypertension     Hypothyroidism     Immune deficiency disorder     Kidney disease     Lung nodule 2014    RML--stable    Obesity     Pacemaker     Metronic    Paroxysmal atrial fibrillation 3/15/2018    Pneumonia     Polyneuropathy     chemo induced    Psychiatric problem     Rheumatoid arthritis involving multiple sites with positive rheumatoid factor 4/19/2023    Stroke 11/16/2020    Tobacco dependence     quit 1976    Trouble in sleeping     Unstable angina 11/27/2020       Past Surgical History:  Past Surgical History:   Procedure Laterality Date    APPENDECTOMY  1966 approx    CARDIAC PACEMAKER PLACEMENT  01/22/2015    CHOLECYSTECTOMY  1993    incidental at time of gastric bypass    COLON SURGERY Right 2017    hemicolectomy    COLONOSCOPY N/A 4/6/2017    Procedure: COLONOSCOPY;  Surgeon: Tye Enamorado MD;  Location: [facility] ENDO;  Service: Endoscopy;  Laterality: N/A;    COLONOSCOPY N/A 11/28/2018    Procedure: COLONOSCOPY;  Surgeon: Saúl Arthur III, MD;  Location: [facility];  Service: Endoscopy;  Laterality: N/A;    CORONARY ANGIOPLASTY  02/2014    CORONARY STENT PLACEMENT  02/05/2014    ESOPHAGOGASTRODUODENOSCOPY N/A 11/28/2018    Procedure: EGD (ESOPHAGOGASTRODUODENOSCOPY);  Surgeon: Saúl Arthur III, MD;  Location: [facility];  Service: Endoscopy;  Laterality: N/A;    GASTRIC BYPASS  1993    with incidental choly    HERNIA REPAIR      IMPLANTATION OF BIVENTRICULAR PERMANENT PACEMAKER AS UPGRADE TO EXISTING PACEMAKER Left 7/13/2023    Procedure: Biventricular pacemaker upgrade/His lead vs CRT-P;  Surgeon: Velasquez Vizcaino MD;  Location: [facility] CATH LAB;  Service: Cardiology;  Laterality: Left;  Will need to upgrade her pacemaker.   "Ideally his pacing lead would be the best approach. MDT/ Alternatively, an upgrade /His lead as Plan A  and  CRT if fails/notified MDT rep Mell and Arik  NOT MRI safe   Pacer and leads implanted    INJECTION OF ANESTHETIC AGENT INTO SACROILIAC JOINT Right 10/8/2020    Procedure: Right BLOCK, SACROILIAC JOINT with RN IV sedation;  Surgeon: Madi Anton MD;  Location: [facility];  Service: Pain Management;  Laterality: Right;    JOINT REPLACEMENT Bilateral 2009    3 months apart    TONSILLECTOMY  1959    VENOGRAM, CATH LAB Bilateral 6/29/2023    Procedure: Venogram, Cath Lab;  Surgeon: Velasquez Vizcaino MD;  Location: [facility] CATH LAB;  Service: Cardiology;  Laterality: Bilateral;  1:00PM arrival time  NOT MRI safe   Pacer and leads implanted by Kian 1/22/15   MDTR SEDR01 Sensia, [id number]   A lead: SAMEER 5076 CapSure Fix Novus, [id number]   RV lead: SAMEER 5076 CapSure Fix Novus, [id number]         Family History:  Family History   Problem Relation Name Age of Onset    Heart disease Mother      Hypertension Mother      Cataracts Mother      Stomach cancer Father          "ulcers that turned to cancer"    Cancer Father          stomach    Pancreatic cancer Sister      Cancer Sister          pancreatic    Leukemia Brother          "leukemia which led to intestinal cancer"    Cataracts Brother      Cancer Brother          leukemia then later stomach cancer    Drug abuse Son      Cancer Son          prostate cancer    Prostate cancer Son      COPD Daughter      Asthma Daughter      Hypertension Maternal Grandfather      Stroke Maternal Grandfather      Alcohol abuse Neg Hx      Diabetes Neg Hx      Intellectual disability Neg Hx      Mental illness Neg Hx         Social History:  Social History     Tobacco Use    Smoking status: Former     Current packs/day: 0.00     Average packs/day: 2.0 packs/day for 6.0 years (12.0 ttl pk-yrs)     Types: Cigarettes     Start date: 3/15/1970     Quit date: 3/15/1976     Years " since quittin.4    Smokeless tobacco: Never   Substance and Sexual Activity    Alcohol use: No     Alcohol/week: 0.0 standard drinks of alcohol    Drug use: No    Sexual activity: Not Currently     Partners: Male        Review of Systems     Review of Systems   Constitutional:  Negative for fever.   HENT:  Negative for sore throat.    Respiratory:  Negative for shortness of breath.    Cardiovascular:  Negative for chest pain.   Gastrointestinal:  Negative for diarrhea, nausea and vomiting.   Genitourinary:  Negative for difficulty urinating and dysuria.   Musculoskeletal:  Negative for back pain.   Skin:  Negative for rash.   Neurological:  Negative for weakness.   Hematological:  Does not bruise/bleed easily.   All other systems reviewed and are negative.     Physical Exam     Initial Vitals [25 0938]   BP Pulse Resp Temp SpO2   130/75 92 16 97.5 °F (36.4 °C) 100 %      MAP       --          Physical Exam  Nursing Notes and Vital Signs Reviewed.  Constitutional: Patient is in no acute distress. Well-developed and well-nourished.  Head: Atraumatic. Normocephalic.  Eyes: PERRL. EOM intact. Conjunctivae are not pale. No scleral icterus.  ENT: Mucous membranes are dry. Oropharynx is clear and symmetric.    Neck: Supple. Full ROM. No lymphadenopathy.  Cardiovascular: No murmurs, rubs, or gallops. Distal pulses are 2+ and symmetric.  Pulmonary/Chest: No respiratory distress. Clear to auscultation bilaterally. No wheezing or rales.  Abdominal: Soft and non-distended. There is no tenderness.  No rebound, guarding, or rigidity. Good bowel sounds.  Genitourinary: No CVA tenderness.  Musculoskeletal: Moves all extremities. No obvious deformities. No edema. No calf tenderness.  Skin: Warm and dry.  Neurological:  Alert, awake, and appropriate.  Normal speech.  No acute focal neurological deficits are appreciated.  Psychiatric: Normal affect. Good eye contact. Appropriate in content.     ED Course   Critical  Care    Date/Time: 7/28/2025 12:00 PM    Performed by: Michelle De La Torre MD  Authorized by: Michelle De La Torre MD  Direct patient critical care time: 35 minutes  Additional history critical care time: 5 minutes  Ordering / reviewing critical care time: 6 minutes  Documentation critical care time: 6 minutes  Consulting other physicians critical care time: 5 minutes  Total critical care time (exclusive of procedural time) : 57 minutes  Critical care was necessary to treat or prevent imminent or life-threatening deterioration of the following conditions: renal failure, dehydration and metabolic crisis.  Critical care was time spent personally by me on the following activities: blood draw for specimens, development of treatment plan with patient or surrogate, discussions with consultants, interpretation of cardiac output measurements, evaluation of patient's response to treatment, examination of patient, obtaining history from patient or surrogate, ordering and performing treatments and interventions, ordering and review of radiographic studies, ordering and review of laboratory studies, pulse oximetry, review of old charts and re-evaluation of patient's condition.        ED Vital Signs:  Vitals:    08/01/25 0100 08/01/25 0300 08/01/25 0400 08/01/25 0429   BP:    128/62   Pulse: 60 61 62 61   Resp:    18   Temp:    98.2 °F (36.8 °C)   TempSrc:    Oral   SpO2:    98%   Weight:       Height:        08/01/25 0430 08/01/25 0500 08/01/25 0702 08/01/25 0755   BP:       Pulse:  62 (!) 59 (!) 59   Resp:       Temp:       TempSrc:       SpO2:       Weight: 65 kg (143 lb 4.8 oz)      Height:        08/01/25 0820 08/01/25 0900 08/01/25 1225 08/01/25 1300   BP: (!) 144/70  136/67    Pulse: 107 60 78 62   Resp: 16  16    Temp: 97.5 °F (36.4 °C)  97.8 °F (36.6 °C)    TempSrc:   Oral    SpO2: (!) 87%  98%    Weight:       Height:        08/01/25 1500 08/01/25 1600 08/01/25 1700   BP:  (!) 162/80    Pulse: (!) 58 62 (!) 59   Resp:  17     Temp:  97.7 °F (36.5 °C)    TempSrc:      SpO2:  98%    Weight:      Height:          Abnormal Lab Results:  Labs Reviewed   COMPREHENSIVE METABOLIC PANEL - Abnormal       Result Value    Sodium 143      Potassium 3.5      Chloride 116 (*)     CO2 11 (*)     Glucose 88      BUN 83 (*)     Creatinine 3.5 (*)     Calcium 7.6 (*)     Protein Total 6.8      Albumin 3.9      Bilirubin Total 0.4      ALP 49      AST 54 (*)     ALT 29      Anion Gap 16      eGFR 12 (*)    CBC WITH DIFFERENTIAL - Abnormal    WBC 5.98      RBC 3.23 (*)     HGB 11.0 (*)     HCT 33.3 (*)      (*)     MCH 34.1 (*)     MCHC 33.0      RDW 16.4 (*)     Platelet Count 154      MPV 10.4      Nucleated RBC 0      Neut % 51.7      Lymph % 38.8      Mono % 8.0      Eos % 0.7      Basophil % 0.5      Imm Grans % 0.3      Neut # 3.09      Lymph # 2.32      Mono # 0.48      Eos # 0.04      Baso # 0.03      Imm Grans # 0.02     PHOSPHORUS - Abnormal    Phosphorus Level 8.9 (*)    URINALYSIS, REFLEX TO URINE CULTURE - Abnormal    Color, UA Yellow      Appearance, UA Clear      pH, UA 6.0      Spec Grav UA 1.010      Protein, UA Trace (*)     Glucose, UA Negative      Ketones, UA Negative      Bilirubin, UA Negative      Blood, UA 1+ (*)     Nitrites, UA Negative      Urobilinogen, UA Negative      Leukocyte Esterase, UA 3+ (*)    MAGNESIUM - Normal    Magnesium  2.0     LACTIC ACID, PLASMA - Normal    Lactic Acid Level 0.8      Narrative:     Falsely low lactic acid results can be found in samples containing >=13.0 mg/dL total bilirubin and/or >=3.5 mg/dL direct bilirubin.    CBC W/ AUTO DIFFERENTIAL    Narrative:     The following orders were created for panel order CBC auto differential.  Procedure                               Abnormality         Status                     ---------                               -----------         ------                     CBC with Differential[[id number]]       Abnormal            Final result                  Please view results for these tests on the individual orders.   URINALYSIS MICROSCOPIC    RBC, UA 1      WBC, UA 3      Bacteria, UA Rare      Squamous Epithelial Cells, UA 3      Microscopic Comment            All Lab Results:  Results for orders placed or performed during the hospital encounter of 07/28/25   Comprehensive metabolic panel    Collection Time: 07/28/25 10:12 AM   Result Value Ref Range    Sodium 143 136 - 145 mmol/L    Potassium 3.5 3.5 - 5.1 mmol/L    Chloride 116 (H) 95 - 110 mmol/L    CO2 11 (L) 23 - 29 mmol/L    Glucose 88 70 - 110 mg/dL    BUN 83 (H) 8 - 23 mg/dL    Creatinine 3.5 (H) 0.5 - 1.4 mg/dL    Calcium 7.6 (L) 8.7 - 10.5 mg/dL    Protein Total 6.8 6.0 - 8.4 gm/dL    Albumin 3.9 3.5 - 5.2 g/dL    Bilirubin Total 0.4 0.1 - 1.0 mg/dL    ALP 49 40 - 150 unit/L    AST 54 (H) 11 - 45 unit/L    ALT 29 10 - 44 unit/L    Anion Gap 16 8 - 16 mmol/L    eGFR 12 (L) >60 mL/min/1.73/m2   CBC with Differential    Collection Time: 07/28/25 10:12 AM   Result Value Ref Range    WBC 5.98 3.90 - 12.70 K/uL    RBC 3.23 (L) 4.00 - 5.40 M/uL    HGB 11.0 (L) 12.0 - 16.0 gm/dL    HCT 33.3 (L) 37.0 - 48.5 %     (H) 82 - 98 fL    MCH 34.1 (H) 27.0 - 31.0 pg    MCHC 33.0 32.0 - 36.0 g/dL    RDW 16.4 (H) 11.5 - 14.5 %    Platelet Count 154 150 - 450 K/uL    MPV 10.4 9.2 - 12.9 fL    Nucleated RBC 0 <=0 /100 WBC    Neut % 51.7 38 - 73 %    Lymph % 38.8 18 - 48 %    Mono % 8.0 4 - 15 %    Eos % 0.7 <=8 %    Basophil % 0.5 <=1.9 %    Imm Grans % 0.3 0.0 - 0.5 %    Neut # 3.09 1.8 - 7.7 K/uL    Lymph # 2.32 1 - 4.8 K/uL    Mono # 0.48 0.3 - 1 K/uL    Eos # 0.04 <=0.5 K/uL    Baso # 0.03 <=0.2 K/uL    Imm Grans # 0.02 0.00 - 0.04 K/uL   Magnesium    Collection Time: 07/28/25 10:22 AM   Result Value Ref Range    Magnesium  2.0 1.6 - 2.6 mg/dL   Phosphorus    Collection Time: 07/28/25 10:22 AM   Result Value Ref Range    Phosphorus Level 8.9 (H) 2.7 - 4.5 mg/dL   EKG 12-lead    Collection Time: 07/28/25 10:54  AM   Result Value Ref Range    QRS Duration 112 ms    [facility] QTC Calculation 462 ms   Urinalysis, Reflex to Urine Culture Urine, Clean Catch    Collection Time: 07/28/25 11:05 AM    Specimen: Urine   Result Value Ref Range    Color, UA Yellow Straw, Lupe, Yellow, Light-Orange    Appearance, UA Clear Clear    pH, UA 6.0 5.0 - 8.0    Spec Grav UA 1.010 1.005 - 1.030    Protein, UA Trace (A) Negative    Glucose, UA Negative Negative    Ketones, UA Negative Negative    Bilirubin, UA Negative Negative    Blood, UA 1+ (A) Negative    Nitrites, UA Negative Negative    Urobilinogen, UA Negative <2.0 EU/dL    Leukocyte Esterase, UA 3+ (A) Negative   Lactic acid, plasma    Collection Time: 07/28/25 11:05 AM   Result Value Ref Range    Lactic Acid Level 0.8 0.5 - 2.2 mmol/L   GREY TOP URINE HOLD    Collection Time: 07/28/25 11:05 AM   Result Value Ref Range    Extra Tube Hold for add-ons.    Urinalysis Microscopic    Collection Time: 07/28/25 11:05 AM   Result Value Ref Range    RBC, UA 1 0 - 4 /HPF    WBC, UA 3 0 - 5 /HPF    Bacteria, UA Rare None, Rare, Occasional /HPF    Squamous Epithelial Cells, UA 3 <=5 /HPF    Microscopic Comment     Comprehensive metabolic panel    Collection Time: 07/29/25  1:47 AM   Result Value Ref Range    Sodium 143 136 - 145 mmol/L    Potassium 3.3 (L) 3.5 - 5.1 mmol/L    Chloride 119 (H) 95 - 110 mmol/L    CO2 10 (L) 23 - 29 mmol/L    Glucose 91 70 - 110 mg/dL    BUN 74 (H) 8 - 23 mg/dL    Creatinine 3.0 (H) 0.5 - 1.4 mg/dL    Calcium 7.1 (L) 8.7 - 10.5 mg/dL    Protein Total 5.8 (L) 6.0 - 8.4 gm/dL    Albumin 3.3 (L) 3.5 - 5.2 g/dL    Bilirubin Total 0.3 0.1 - 1.0 mg/dL    ALP 38 (L) 40 - 150 unit/L    AST 40 11 - 45 unit/L    ALT 25 10 - 44 unit/L    Anion Gap 14 8 - 16 mmol/L    eGFR 15 (L) >60 mL/min/1.73/m2   Magnesium    Collection Time: 07/29/25  1:47 AM   Result Value Ref Range    Magnesium  1.8 1.6 - 2.6 mg/dL   Phosphorus    Collection Time: 07/29/25  1:47 AM   Result Value Ref Range     Phosphorus Level 8.0 (H) 2.7 - 4.5 mg/dL   Comprehensive metabolic panel    Collection Time: 07/29/25  2:18 AM   Result Value Ref Range    Sodium 144 136 - 145 mmol/L    Potassium 3.6 3.5 - 5.1 mmol/L    Chloride 119 (H) 95 - 110 mmol/L    CO2 11 (L) 23 - 29 mmol/L    Glucose 92 70 - 110 mg/dL    BUN 74 (H) 8 - 23 mg/dL    Creatinine 3.0 (H) 0.5 - 1.4 mg/dL    Calcium 7.2 (L) 8.7 - 10.5 mg/dL    Protein Total 5.9 (L) 6.0 - 8.4 gm/dL    Albumin 3.3 (L) 3.5 - 5.2 g/dL    Bilirubin Total 0.3 0.1 - 1.0 mg/dL    ALP 36 (L) 40 - 150 unit/L    AST 39 11 - 45 unit/L    ALT 24 10 - 44 unit/L    Anion Gap 14 8 - 16 mmol/L    eGFR 15 (L) >60 mL/min/1.73/m2   CBC with Differential    Collection Time: 07/29/25  2:18 AM   Result Value Ref Range    WBC 7.04 3.90 - 12.70 K/uL    RBC 2.93 (L) 4.00 - 5.40 M/uL    HGB 10.1 (L) 12.0 - 16.0 gm/dL    HCT 30.4 (L) 37.0 - 48.5 %     (H) 82 - 98 fL    MCH 34.5 (H) 27.0 - 31.0 pg    MCHC 33.2 32.0 - 36.0 g/dL    RDW 16.6 (H) 11.5 - 14.5 %    Platelet Count 133 (L) 150 - 450 K/uL    MPV 10.3 9.2 - 12.9 fL    Nucleated RBC 0 <=0 /100 WBC    Neut % 63.1 38 - 73 %    Lymph % 27.3 18 - 48 %    Mono % 7.1 4 - 15 %    Eos % 1.8 <=8 %    Basophil % 0.3 <=1.9 %    Imm Grans % 0.4 0.0 - 0.5 %    Neut # 4.44 1.8 - 7.7 K/uL    Lymph # 1.92 1 - 4.8 K/uL    Mono # 0.50 0.3 - 1 K/uL    Eos # 0.13 <=0.5 K/uL    Baso # 0.02 <=0.2 K/uL    Imm Grans # 0.03 0.00 - 0.04 K/uL   Basic Metabolic Panel    Collection Time: 07/30/25  6:29 AM   Result Value Ref Range    Sodium 144 136 - 145 mmol/L    Potassium 3.2 (L) 3.5 - 5.1 mmol/L    Chloride 122 (H) 95 - 110 mmol/L    CO2 12 (L) 23 - 29 mmol/L    Glucose 87 70 - 110 mg/dL    BUN 59 (H) 8 - 23 mg/dL    Creatinine 2.6 (H) 0.5 - 1.4 mg/dL    Calcium 7.0 (L) 8.7 - 10.5 mg/dL    Anion Gap 10 8 - 16 mmol/L    eGFR 18 (L) >60 mL/min/1.73/m2   Phosphorus    Collection Time: 07/31/25  4:50 AM   Result Value Ref Range    Phosphorus Level 5.3 (H) 2.7 - 4.5 mg/dL    Basic Metabolic Panel    Collection Time: 07/31/25  4:50 AM   Result Value Ref Range    Sodium 143 136 - 145 mmol/L    Potassium 4.7 3.5 - 5.1 mmol/L    Chloride 122 (H) 95 - 110 mmol/L    CO2 11 (L) 23 - 29 mmol/L    Glucose 94 70 - 110 mg/dL    BUN 51 (H) 8 - 23 mg/dL    Creatinine 2.9 (H) 0.5 - 1.4 mg/dL    Calcium 7.5 (L) 8.7 - 10.5 mg/dL    Anion Gap 10 8 - 16 mmol/L    eGFR 16 (L) >60 mL/min/1.73/m2   Renal Function Panel    Collection Time: 08/01/25  4:26 AM   Result Value Ref Range    Sodium 140 136 - 145 mmol/L    Potassium 6.0 (H) 3.5 - 5.1 mmol/L    Chloride 120 (H) 95 - 110 mmol/L    CO2 12 (L) 23 - 29 mmol/L    Glucose 67 (L) 70 - 110 mg/dL    BUN 40 (H) 8 - 23 mg/dL    Creatinine 2.5 (H) 0.5 - 1.4 mg/dL    Calcium 7.3 (L) 8.7 - 10.5 mg/dL    Albumin 3.2 (L) 3.5 - 5.2 g/dL    Phosphorus Level 4.1 2.7 - 4.5 mg/dL    Anion Gap 8 8 - 16 mmol/L    eGFR 19 (L) >60 mL/min/1.73/m2   CBC with Differential    Collection Time: 08/01/25  4:26 AM   Result Value Ref Range    WBC 7.03 3.90 - 12.70 K/uL    RBC 2.69 (L) 4.00 - 5.40 M/uL    HGB 9.1 (L) 12.0 - 16.0 gm/dL    HCT 28.8 (L) 37.0 - 48.5 %     (H) 82 - 98 fL    MCH 33.8 (H) 27.0 - 31.0 pg    MCHC 31.6 (L) 32.0 - 36.0 g/dL    RDW 17.1 (H) 11.5 - 14.5 %    Platelet Count 132 (L) 150 - 450 K/uL    MPV 10.7 9.2 - 12.9 fL    Nucleated RBC 0 <=0 /100 WBC    Neut % 42.0 38 - 73 %    Lymph % 47.4 18 - 48 %    Mono % 7.3 4 - 15 %    Eos % 2.6 <=8 %    Basophil % 0.3 <=1.9 %    Imm Grans % 0.4 0.0 - 0.5 %    Neut # 2.96 1.8 - 7.7 K/uL    Lymph # 3.33 1 - 4.8 K/uL    Mono # 0.51 0.3 - 1 K/uL    Eos # 0.18 <=0.5 K/uL    Baso # 0.02 <=0.2 K/uL    Imm Grans # 0.03 0.00 - 0.04 K/uL   Basic metabolic panel    Collection Time: 08/01/25  2:55 PM   Result Value Ref Range    Sodium 138 136 - 145 mmol/L    Potassium 5.6 (H) 3.5 - 5.1 mmol/L    Chloride 120 (H) 95 - 110 mmol/L    CO2 13 (L) 23 - 29 mmol/L    Glucose 73 70 - 110 mg/dL    BUN 36 (H) 8 - 23 mg/dL     Creatinine 2.2 (H) 0.5 - 1.4 mg/dL    Calcium 7.4 (L) 8.7 - 10.5 mg/dL    Anion Gap 5 (L) 8 - 16 mmol/L    eGFR 22 (L) >60 mL/min/1.73/m2     *Note: Due to a large number of results and/or encounters for the requested time period, some results have not been displayed. A complete set of results can be found in Results Review.       Imaging Results:  Imaging Results              US Kidney (Final result)  Result time 07/28/25 12:13:21      Final result by Sulema OchoaLópez), MD (07/28/25 12:13:21)                   Impression:      No significant hydronephrosis.  1.3 cm right upper pole renal cyst.      Electronically signed by: Sulema Ochoa MD  Date:    07/28/2025  Time:    12:13               Narrative:    EXAMINATION:  US KIDNEY    CLINICAL HISTORY:  acute renal failure;    COMPARISON:  None    FINDINGS:  Right kidney measures 9.2 cm in length, maintaining normal cortical thickness and echotexture with no hydronephrosis.  1.3 cm cyst upper pole right kidney.    A 9.0 cm left kidney also maintains normal cortical thickness and echotexture  with no hydronephrosis.    Bladder appears unremarkable.                                       X-Ray Chest AP Portable (Final result)  Result time 07/28/25 11:00:40      Final result by Vinnie Amezcua MD (07/28/25 11:00:40)                   Impression:      1.  Negative for acute process involving the chest.    2.  Stable findings as noted above.      Electronically signed by: Vinnie Amezcua MD  Date:    07/28/2025  Time:    11:00               Narrative:    EXAMINATION:  XR CHEST AP PORTABLE    CLINICAL HISTORY:  Chronic atrial fibrillation, unspecified    COMPARISON:  Studies dating back to November 5, 2021    FINDINGS:  The study is lordotic in position.  EKG leads overlie the lungs are clear. The cardiac silhouette size is normal. The trachea is midline and the mediastinal width is normal. Negative for focal infiltrate, effusion or pneumothorax. Pulmonary  vasculature is normal. Negative for osseous abnormalities. Three lead left subclavian pacemaker/ICD device again seen.  Tortuous aorta with calcifications of the aortic knob.  There are degenerative changes of the spine and both shoulder girdles.  Convex right curvature of the upper thoracic spine.  Ring like device in the left upper quadrant with other postoperative changes peer                                       The EKG was ordered, reviewed, and independently interpreted by the ED provider.  Interpretation time: 10:54  Rate: 60 BPM  Rhythm: Ventricular-paced rhythm  Interpretation: Abnormal ECG. No STEMI.           The Emergency Provider reviewed the vital signs and test results, which are outlined above.     ED Discussion     11:53 AM: Discussed case with MIROSLAVA Medina ([facility]). Dr. Carrasquillo agrees with current care and management of pt and accepts admission.   Admitting Service: [facility]  Admitting Physician: Dr. Carrasquillo  Admit to: [facility] Med Tele    11:53 AM: Re-evaluated pt. I have discussed test results, shared treatment plan, and the need for admission with patient/family/caretaker at bedside. Pt and/or family/caretaker express understanding at this time and agree with all information. All questions answered. Pt/caretaker/family member(s) have no further questions or concerns at this time. Pt is ready for admit.              Medical Decision Making  DDX: 1. Dehydration 2. Medical renal disease 3. Infection    ECG paced rhthym, unchanged from prior, CXR normal, renal US unremarkable, lab work reviewed and wbc normal, h/h stable, BUN and Creatinine still elevated but improved slightly from 2 days ago, bicarb 11, anion gap normal, UTI noted, gentle hydration initiated, iv antibiotics initiated,  indication of uremia, hospital med to admit.     Amount and/or Complexity of Data Reviewed  External Data Reviewed: labs and notes.     Details: Bun 80/ Creatine 4.0 2 days prior on outpatient lab  work  Labs: ordered. Decision-making details documented in ED Course.  Radiology: ordered. Decision-making details documented in ED Course.  ECG/medicine tests: ordered and independent interpretation performed. Decision-making details documented in ED Course.    Risk  Prescription drug management.  Decision regarding hospitalization.                ED Medication(s):  Medications   0.9% NaCl infusion ( Intravenous Stopped 7/28/25 1347)   sodium chloride 0.9% bolus 500 mL 500 mL (0 mLs Intravenous Stopped 7/28/25 1235)   cefTRIAXone injection 1 g (1 g Intravenous Given 7/28/25 1217)   potassium chloride SA CR tablet 40 mEq (40 mEq Oral Given 7/31/25 2016)       Discharge Medication List as of 8/1/2025  5:12 PM           Contact information for follow-up providers       Ander Goetz MD Follow up on 8/13/2025.    Specialty: Nephrology  Why: 8:30am @ [facility]  Contact information:  [address] [address] NEPHROLOGY  [facility] [address]  [phone]               Marti Coronel MD Follow up in 1 week(s).    Specialty: Family Medicine  Contact information:  [id number] [place] [address] [address] [place] [address]  [phone]                       Contact information for after-discharge care       Durable Medical Equipment       [facility] .    Service: Durable Medical Equipment  Contact information:  [address] [place] [address]  [phone]                     Home Medical Care       [facility] .    Service: Home Health Services  Contact information:  [address] [place] [address]  [phone]                                       Scribe Attestation:   Scribe #1: I performed the above scribed service and the documentation accurately describes the services I performed. I attest to the accuracy of the note.     Attending:   Physician Attestation Statement for Scribe #1: I, Michelle De La Torre MD, personally  performed the services described in this documentation, as scribed by Maite Marsh, in my presence, and it is both accurate and complete.           Clinical Impression       ICD-10-CM ICD-9-CM   1. Acute renal failure superimposed on stage 4 chronic kidney disease, unspecified acute renal failure type  N17.9 584.9    N18.4 585.4   2. Chronic atrial fibrillation  I48.20 427.31   3. Acute cystitis without hematuria  N30.00 595.0   4. Intravascular volume depletion  E86.1 276.52   5. Acute renal failure superimposed on chronic kidney disease, unspecified acute renal failure type, unspecified CKD stage  N17.9 584.9    N18.9 585.9   6. Accident due to mechanical fall without injury, initial encounter  W19.XXXA E888.9       Disposition:   Disposition: Admitted  Condition: Michelle Almendarez MD  08/04/25 1564

## 2025-07-28 NOTE — PROGRESS NOTES
Reviewed labs yesterday 7/27  has paul called patient number and her daughter numbers in chart left voicemial no answer.  Will reattempt calling this morning.

## 2025-07-28 NOTE — ASSESSMENT & PLAN NOTE
"Patient has Diastolic (HFpEF) heart failure that is Chronic. On presentation their CHF was well compensated. Most recent BNP and echo results are listed below.  No results for input(s): "BNP" in the last 72 hours.  Latest ECHO  Results for orders placed during the hospital encounter of 05/19/25    Echo    Interpretation Summary    Left Ventricle: The left ventricle is normal in size. Mildly increased ventricular mass. Mildly increased wall thickness. There is mild concentric hypertrophy. There is low normal systolic function with a visually estimated ejection fraction of 50 - 55%. Grade III diastolic dysfunction. Elevated left ventricular filling pressure.    Right Ventricle: The right ventricle is normal in size Wall thickness is normal. Systolic function is borderline low. Pacemaker lead present in the ventricle.    Left Atrium: The left atrium is mildly dilated    Right Atrium: The right atrium is mildly dilated .    Aortic Valve: There is moderate aortic valve sclerosis. Moderately restricted motion. There is moderate to severe stenosis. Aortic valve area by VTI is 0.8 cm². Aortic valve peak velocity is 2.4 m/s. Mean gradient is 12 mmHg. The dimensionless index is 0.26.    Mitral Valve: Mildly calcified leaflets. There is moderate mitral annular calcification. There is mild regurgitation.    Tricuspid Valve: There is mild regurgitation.    Pulmonary Artery: There is mild pulmonary hypertension. The estimated pulmonary artery systolic pressure is 44 mmHg.    IVC/SVC: Normal venous pressure at 3 mmHg.    Current Heart Failure Medications       Plan  - Monitor strict I&Os and daily weights.    - Place on telemetry  - Low sodium diet  - Cardiology has not been consulted           "

## 2025-07-28 NOTE — HPI
Violet Swenson is a 85 year old female who  has a past medical history of Age-related osteoporosis without current pathological fracture, Anemia, Anxiety, Arthritis, Atrial flutter, Cancer, CHF (congestive heart failure), Chronic anemia, Chronic midline low back pain with right-sided sciatica, Coronary artery disease, Depression, Disorder of kidney and ureter, Encounter for blood transfusion, GERD (gastroesophageal reflux disease), Gout, arthritis, Heart failure, psychiatric care, Hyperlipidemia, Hypertension, Hypothyroidism, Immune deficiency disorder, Kidney disease, Lung nodule, Obesity, Pacemaker, Paroxysmal atrial fibrillation, Pneumonia, Polyneuropathy, Psychiatric problem, Rheumatoid arthritis involving multiple sites with positive rheumatoid factor, Stroke, Tobacco dependence, Trouble in sleeping, and Unstable angina who presented to the Emergency Department for evaluation of abnormal lab values. She had labs drawn on 7/25 for pending angiogram. Creatinine noted to be 4.1. She was called this morning and informed to go to ED for evaluation. She is followed by Dr. Gil outpatient and plans for cardiac cath were in place.    In ED, WBC count 5.98K, Hgb/Hct 11.0/33.3, CO2 11, BUN 83, Creatinine 3.5, Phosphorus 8.9, lactic acid 0.8. UA showed 1+ blood and 3+ leukos. CXR with no acute findings. Renal US showed no significant hydronephrosis. 1.3 cm right upper pole renal cyst. She received  mL bolus and one time dose of Ceftriaxone 1 gm IV. Following a comprehensive evaluation of the patient's clinical presentation, vital signs, laboratory results, and risk factors, myself with the attending made  the active decision to admit the patient for further monitoring, diagnostic work-up, and initiation of appropriate treatment, given the potential for clinical deterioration if managed in an outpatient setting.

## 2025-07-28 NOTE — PLAN OF CARE
Problem: Hospitalized Older Adult  Goal: Optimal Cognitive Function  Outcome: Progressing  Goal: Effective Bowel Elimination  Outcome: Progressing  Goal: Optimal Coping  Outcome: Progressing  Goal: Fluid and Electrolyte Balance  Outcome: Progressing  Goal: Optimal Functional Ability  Outcome: Progressing  Goal: Improved Oral Intake  Outcome: Progressing  Goal: Adequate Sleep/Rest  Outcome: Progressing  Goal: Effective Urinary Elimination  Outcome: Progressing     Problem: Adult Inpatient Plan of Care  Goal: Plan of Care Review  Outcome: Progressing  Goal: Patient-Specific Goal (Individualized)  Outcome: Progressing  Goal: Absence of Hospital-Acquired Illness or Injury  Outcome: Progressing  Goal: Optimal Comfort and Wellbeing  Outcome: Progressing  Goal: Readiness for Transition of Care  Outcome: Progressing     Problem: Acute Kidney Injury/Impairment  Goal: Fluid and Electrolyte Balance  Outcome: Progressing  Goal: Improved Oral Intake  Outcome: Progressing  Goal: Effective Renal Function  Outcome: Progressing     Problem: Fall Injury Risk  Goal: Absence of Fall and Fall-Related Injury  Outcome: Progressing   POC reviewed with pt. Pt verbalizes understanding of POC. No questions at this time.  AAOx4. NADN.  Paced on cardiac monitor.  Pt remains free of falls.  No complaints at this time.  Safety measures in place. Will continue to monitor.  Informed pt to call for assistance before getting up. Pt verbalizes understanding.  Hourly rounding and chart check complete.

## 2025-07-28 NOTE — H&P
[facility]  History & Physical    Patient Name: Violet Swenson  MRN: [id number]  Patient Class: OP- Observation  Admission Date: 7/28/2025  Attending Physician: Charley Carrasquillo,*   Primary Care Provider: Marti Coronel MD         Patient information was obtained from patient and ER records.     Subjective:     Principal Problem:Acute on chronic kidney failure    Chief Complaint:   Chief Complaint   Patient presents with    Abnormal Labs     Pt. Was sent by her DrLino For abnormal kidney function. Labs were done on 7/25.        HPI: Violet Swenson is a 85 year old female who  has a past medical history of Age-related osteoporosis without current pathological fracture, Anemia, Anxiety, Arthritis, Atrial flutter, Cancer, CHF (congestive heart failure), Chronic anemia, Chronic midline low back pain with right-sided sciatica, Coronary artery disease, Depression, Disorder of kidney and ureter, Encounter for blood transfusion, GERD (gastroesophageal reflux disease), Gout, arthritis, Heart failure, psychiatric care, Hyperlipidemia, Hypertension, Hypothyroidism, Immune deficiency disorder, Kidney disease, Lung nodule, Obesity, Pacemaker, Paroxysmal atrial fibrillation, Pneumonia, Polyneuropathy, Psychiatric problem, Rheumatoid arthritis involving multiple sites with positive rheumatoid factor, Stroke, Tobacco dependence, Trouble in sleeping, and Unstable angina who presented to the Emergency Department for evaluation of abnormal lab values. She had labs drawn on 7/25 for pending angiogram. Creatinine noted to be 4.1. She was called this morning and informed to go to ED for evaluation. She is followed by Dr. Gil outpatient and plans for cardiac cath were in place.    In ED, WBC count 5.98K, Hgb/Hct 11.0/33.3, CO2 11, BUN 83, Creatinine 3.5, Phosphorus 8.9, lactic acid 0.8. UA showed 1+ blood and 3+ leukos. CXR with no acute findings. Renal US showed no  significant hydronephrosis. 1.3 cm right upper pole renal cyst. She received  mL bolus and one time dose of Ceftriaxone 1 gm IV. Following a comprehensive evaluation of the patient's clinical presentation, vital signs, laboratory results, and risk factors, myself with the attending made  the active decision to admit the patient for further monitoring, diagnostic work-up, and initiation of appropriate treatment, given the potential for clinical deterioration if managed in an outpatient setting.     Past Medical History:   Diagnosis Date    Age-related osteoporosis without current pathological fracture 8/20/2018    Anemia     Anxiety     Arthritis     Atrial flutter     Cancer     lymphoma Large cell B    CHF (congestive heart failure)     Chronic anemia 4/26/2017    Chronic midline low back pain with right-sided sciatica 8/20/2018    Coronary artery disease     01/2015 LHC patent LCX. 50% stenosis in LAD and RCA.      Depression     Disorder of kidney and ureter     Encounter for blood transfusion     GERD (gastroesophageal reflux disease)     Gout, arthritis     Heart failure     Hx of psychiatric care     Hyperlipidemia     Hypertension     Hypothyroidism     Immune deficiency disorder     Kidney disease     Lung nodule 2014    RML--stable    Obesity     Pacemaker     Metronic    Paroxysmal atrial fibrillation 3/15/2018    Pneumonia     Polyneuropathy     chemo induced    Psychiatric problem     Rheumatoid arthritis involving multiple sites with positive rheumatoid factor 4/19/2023    Stroke 11/16/2020    Tobacco dependence     quit 1976    Trouble in sleeping     Unstable angina 11/27/2020       Past Surgical History:   Procedure Laterality Date    APPENDECTOMY  1966 approx    CARDIAC PACEMAKER PLACEMENT  01/22/2015    CHOLECYSTECTOMY  1993    incidental at time of gastric bypass    COLON SURGERY Right 2017    hemicolectomy    COLONOSCOPY N/A 4/6/2017    Procedure: COLONOSCOPY;  Surgeon: Tye Enamorado MD;   Location: [facility] ENDO;  Service: Endoscopy;  Laterality: N/A;    COLONOSCOPY N/A 11/28/2018    Procedure: COLONOSCOPY;  Surgeon: Saúl Arthur III, MD;  Location: [facility];  Service: Endoscopy;  Laterality: N/A;    CORONARY ANGIOPLASTY  02/2014    CORONARY STENT PLACEMENT  02/05/2014    ESOPHAGOGASTRODUODENOSCOPY N/A 11/28/2018    Procedure: EGD (ESOPHAGOGASTRODUODENOSCOPY);  Surgeon: Saúl Arthur III, MD;  Location: [facility];  Service: Endoscopy;  Laterality: N/A;    GASTRIC BYPASS  1993    with incidental choly    HERNIA REPAIR      IMPLANTATION OF BIVENTRICULAR PERMANENT PACEMAKER AS UPGRADE TO EXISTING PACEMAKER Left 7/13/2023    Procedure: Biventricular pacemaker upgrade/His lead vs CRT-P;  Surgeon: Velasquez Vizcaino MD;  Location: [facility] CATH LAB;  Service: Cardiology;  Laterality: Left;  Will need to upgrade her pacemaker.  Ideally his pacing lead would be the best approach. MDT/ Alternatively, an upgrade /His lead as Plan A  and  CRT if fails/notified MDT rep Arabella  NOT MRI safe   Pacer and leads implanted    INJECTION OF ANESTHETIC AGENT INTO SACROILIAC JOINT Right 10/8/2020    Procedure: Right BLOCK, SACROILIAC JOINT with RN IV sedation;  Surgeon: Madi Anton MD;  Location: [facility];  Service: Pain Management;  Laterality: Right;    JOINT REPLACEMENT Bilateral 2009    3 months apart    TONSILLECTOMY  1959    VENOGRAM, CATH LAB Bilateral 6/29/2023    Procedure: Venogram, Cath Lab;  Surgeon: Velasquez Vizcaino MD;  Location: [facility] CATH LAB;  Service: Cardiology;  Laterality: Bilateral;  1:00PM arrival time  NOT MRI safe   Pacer and leads implanted by Kian 1/22/15   MDTR SEDR01 Hodan, [id number]   A lead: SAMEER 5076 CapSure Fix Novus, [id number]   RV lead: SAMEER 5076 CapSure Fix Efren, [id number]       Review of patient's allergies indicates:   Allergen Reactions    Corticosteroids (glucocorticoids) Nausea Only and Other (See Comments)     Stomach pain, dizziness, headache     Oxycodone Other (See Comments)     Blood pressure dropped       No current facility-administered medications on file prior to encounter.     Current Outpatient Medications on File Prior to Encounter   Medication Sig    acetaminophen (TYLENOL ARTHRITIS PAIN) 650 MG TbSR Take 650 mg by mouth every 8 (eight) hours.    allopurinoL (ZYLOPRIM) 300 MG tablet Take 1 tablet (300 mg total) by mouth once daily.    ascorbic acid, vitamin C, (VITAMIN C) 100 MG tablet Take by mouth once daily.    azelastine (ASTELIN) 137 mcg (0.1 %) nasal spray 1 spray (137 mcg total) by Nasal route 2 (two) times daily.    busPIRone (BUSPAR) 7.5 MG tablet Take 1 tablet (7.5 mg total) by mouth 2 (two) times daily.    calcitRIOL (ROCALTROL) 0.25 MCG Cap TAKE ONE CAPSULE BY MOUTH EVERY MONDAY, WEDNESDAY, AND FRIDAY    clopidogreL (PLAVIX) 75 mg tablet TAKE ONE TABLET BY MOUTH EVERY DAY    diclofenac sodium (VOLTAREN) 1 % Gel Apply 2 g topically once daily. Apply 2 g over painful joints once or twice a day.    DIPRIVAN 10 mg/mL infusion     DULoxetine (CYMBALTA) 30 MG capsule TAKE ONE CAPSULE BY MOUTH EVERY DAY    ELIQUIS 2.5 mg Tab TAKE ONE TABLET BY MOUTH TWICE DAILY    ergocalciferol (ERGOCALCIFEROL) 50,000 unit Cap TAKE ONE CAPSULE BY MOUTH EVERY 7 DAYS    fluticasone propionate (FLONASE) 50 mcg/actuation nasal spray 2 sprays (100 mcg total) by Each Nostril route once daily.    furosemide (LASIX) 40 MG tablet TAKE ONE TABLET (40MG) BY MOUTH EVERY DAY; THEN TAKE TWO TABLETS (80MG TOTAL) EVERY OTHER DAY    hydroxychloroquine (PLAQUENIL) 200 mg tablet Take 1 tablet (200 mg total) by mouth every other day.    iron-vitamin C 100-250 mg, ICAR-C, 100-250 mg Tab TAKE ONE TABLET BY MOUTH EVERY DAY    levocetirizine (XYZAL) 5 MG tablet Take 1 tablet (5 mg total) by mouth every evening.    levothyroxine (SYNTHROID) 112 MCG tablet Take 1 tablet (112 mcg total) by mouth once daily.    metoprolol tartrate (LOPRESSOR) 25 MG tablet Take 1 tablet (25 mg total) by  mouth 2 (two) times daily.    multivitamin (THERAGRAN) per tablet Take 1 tablet by mouth once daily.    olmesartan (BENICAR) 20 MG tablet Take 1 tablet (20 mg total) by mouth once daily.    orphenadrine (NORFLEX) 100 mg tablet Take 1 tablet (100 mg total) by mouth 2 (two) times daily.    pravastatin (PRAVACHOL) 40 MG tablet Take 1 tablet (40 mg total) by mouth once daily.    pregabalin (LYRICA) 50 MG capsule Take 50 mg by mouth 3 (three) times daily.    sertraline (ZOLOFT) 100 MG tablet TAKE 1 & 1/2 TABLETS BY MOUTH EVERY DAY    sodium bicarbonate 650 MG tablet TAKE ONE TABLET BY MOUTH TWICE DAILY    traMADoL (ULTRAM) 50 mg tablet Take 1 tablet (50 mg total) by mouth every 6 (six) hours as needed for Pain.    tumeric-ging-olive-oreg-capryl 100 mg-150 mg- 50 mg-150 mg Cap Take by mouth.    ZINC ACETATE ORAL Take 250 mg by mouth once daily.    glucosamine-D3-Boswellia serr (OSTEO BI-FLEX, 5-LOXIN,) 1,500-400-100 mg-unit-mg Tab Take by mouth. (Patient not taking: Reported on 2025)     Family History       Problem Relation (Age of Onset)    Asthma Daughter    COPD Daughter    Cancer Father, Sister, Brother, Son    Cataracts Mother, Brother    Drug abuse Son    Heart disease Mother    Hypertension Mother, Maternal Grandfather    Leukemia Brother    Pancreatic cancer Sister    Prostate cancer Son    Stomach cancer Father    Stroke Maternal Grandfather          Tobacco Use    Smoking status: Former     Current packs/day: 0.00     Average packs/day: 2.0 packs/day for 6.0 years (12.0 ttl pk-yrs)     Types: Cigarettes     Start date: 3/15/1970     Quit date: 3/15/1976     Years since quittin.4    Smokeless tobacco: Never   Substance and Sexual Activity    Alcohol use: No     Alcohol/week: 0.0 standard drinks of alcohol    Drug use: No    Sexual activity: Not Currently     Partners: Male     Review of Systems   Constitutional:  Positive for activity change. Negative for appetite change, chills and fever.   HENT:   Negative for trouble swallowing.    Eyes:  Negative for visual disturbance.   Respiratory:  Negative for cough, choking, chest tightness, shortness of breath and wheezing.    Cardiovascular:  Negative for chest pain and palpitations.   Gastrointestinal:  Negative for abdominal pain, constipation, diarrhea, nausea and vomiting.   Genitourinary:  Negative for difficulty urinating.   Musculoskeletal:  Negative for arthralgias.   Skin:  Negative for color change.   Neurological:  Positive for weakness. Negative for dizziness, tremors, seizures, syncope, facial asymmetry, speech difficulty, light-headedness, numbness and headaches.   Psychiatric/Behavioral:  Negative for agitation, behavioral problems and confusion.      Objective:     Vital Signs (Most Recent):  Temp: 97.5 °F (36.4 °C) (07/28/25 0938)  Pulse: 60 (07/28/25 1224)  Resp: 18 (07/28/25 1018)  BP: 137/68 (07/28/25 1224)  SpO2: 95 % (07/28/25 1224) Vital Signs (24h Range):  Temp:  [97.5 °F (36.4 °C)] 97.5 °F (36.4 °C)  Pulse:  [60-92] 60  Resp:  [16-18] 18  SpO2:  [95 %-100 %] 95 %  BP: (130-137)/(66-75) 137/68     Weight: 55.8 kg (123 lb)  Body mass index is 21.11 kg/m².     Physical Exam  Vitals reviewed.   Constitutional:       General: She is not in acute distress.     Appearance: She is ill-appearing.   HENT:      Head: Normocephalic and atraumatic.      Mouth/Throat:      Mouth: Mucous membranes are dry.   Eyes:      Extraocular Movements: Extraocular movements intact.   Cardiovascular:      Rate and Rhythm: Normal rate. Rhythm irregular.   Pulmonary:      Effort: Pulmonary effort is normal. No respiratory distress.      Breath sounds: Normal breath sounds.   Abdominal:      General: Bowel sounds are normal. There is no distension.      Palpations: Abdomen is soft.      Tenderness: There is no abdominal tenderness.   Genitourinary:     Comments: deferred  Musculoskeletal:         General: Normal range of motion.      Cervical back: Normal range of  motion.   Skin:     General: Skin is warm and dry.      Capillary Refill: Capillary refill takes less than 2 seconds.      Coloration: Skin is pale.   Neurological:      Mental Status: She is alert and oriented to person, place, and time.   Psychiatric:         Mood and Affect: Mood normal.         Behavior: Behavior normal.         Thought Content: Thought content normal.                Significant Labs: All pertinent labs within the past 24 hours have been reviewed.  CBC:   Recent Labs   Lab 07/28/25  1012   WBC 5.98   HGB 11.0*   HCT 33.3*        CMP:   Recent Labs   Lab 07/28/25  1012      K 3.5   *   CO2 11*   GLU 88   BUN 83*   CREATININE 3.5*   CALCIUM 7.6*   PROT 6.8   ALBUMIN 3.9   BILITOT 0.4   ALKPHOS 49   AST 54*   ALT 29   ANIONGAP 16     Lactic Acid:   Recent Labs   Lab 07/28/25  1105   LACTATE 0.8     Magnesium:   Recent Labs   Lab 07/28/25  1022   MG 2.0     Urine Studies:   Recent Labs   Lab 07/28/25  1105   COLORU Yellow   APPEARANCEUA Clear   PHUR 6.0   SPECGRAV 1.010   PROTEINUA Trace*   GLUCUA Negative   BILIRUBINUA Negative   OCCULTUA 1+*   NITRITE Negative   UROBILINOGEN Negative   LEUKOCYTESUR 3+*   RBCUA 1   WBCUA 3   BACTERIA Rare       Significant Imaging:   Imaging Results              US Kidney (Final result)  Result time 07/28/25 12:13:21      Final result by Sulema Ochoa ([facility]), MD (07/28/25 12:13:21)                   Impression:      No significant hydronephrosis.  1.3 cm right upper pole renal cyst.      Electronically signed by: Sulema Ochoa MD  Date:    07/28/2025  Time:    12:13               Narrative:    EXAMINATION:  US KIDNEY    CLINICAL HISTORY:  acute renal failure;    COMPARISON:  None    FINDINGS:  Right kidney measures 9.2 cm in length, maintaining normal cortical thickness and echotexture with no hydronephrosis.  1.3 cm cyst upper pole right kidney.    A 9.0 cm left kidney also maintains normal cortical thickness and echotexture  with no  hydronephrosis.    Bladder appears unremarkable.                                       X-Ray Chest AP Portable (Final result)  Result time 07/28/25 11:00:40      Final result by Vinnie Amezcua MD (07/28/25 11:00:40)                   Impression:      1.  Negative for acute process involving the chest.    2.  Stable findings as noted above.      Electronically signed by: Vinnie Amezcua MD  Date:    07/28/2025  Time:    11:00               Narrative:    EXAMINATION:  XR CHEST AP PORTABLE    CLINICAL HISTORY:  Chronic atrial fibrillation, unspecified    COMPARISON:  Studies dating back to November 5, 2021    FINDINGS:  The study is lordotic in position.  EKG leads overlie the lungs are clear. The cardiac silhouette size is normal. The trachea is midline and the mediastinal width is normal. Negative for focal infiltrate, effusion or pneumothorax. Pulmonary vasculature is normal. Negative for osseous abnormalities. Three lead left subclavian pacemaker/ICD device again seen.  Tortuous aorta with calcifications of the aortic knob.  There are degenerative changes of the spine and both shoulder girdles.  Convex right curvature of the upper thoracic spine.  Ring like device in the left upper quadrant with other postoperative changes peer                                     Assessment/Plan:     Assessment & Plan  Acute on chronic kidney failure  NATHALIE is likely due to pre-renal azotemia due to intravascular volume depletion. Baseline creatinine is around 1.9. Most recent creatinine and eGFR are listed below.  Recent Labs     07/28/25  1012   CREATININE 3.5*   EGFRNORACEVR 12*      Plan  - NATHALIE is improving  - Avoid nephrotoxins and renally dose meds for GFR listed above  - Monitor urine output, serial BMP, and adjust therapy as needed  - Renal US showed no significant hydronephrosis. 1.3 cm right upper pole renal cyst.  - IVF   Essential hypertension  Patient's blood pressure range in the last 24 hours was: BP  Min: 130/75  Max:  "144/66.The patient's inpatient anti-hypertensive regimen is listed below:  Current Antihypertensives  metoprolol tartrate (LOPRESSOR) tablet 25 mg, 2 times daily, Oral    Plan  - BP is controlled, no changes needed to their regimen  - Hold Olmesartan and Furosemide due to NATHALIE  Mixed hyperlipidemia  Lab Results   Component Value Date    CHOL 91 (L) 07/25/2025    HDL 47 07/25/2025    LDLCALC 21.2 (L) 07/25/2025    TRIG 114 07/25/2025     -continue statin therapy  Hypothyroidism (acquired)  Lab Results   Component Value Date    TSH 3.585 07/25/2025    TSH 6.633 (H) 01/25/2025    TSH 5.875 (H) 06/19/2024      -Continue Levothyroxine  Permanent atrial fibrillation  Patient has permanent atrial fibrillation. Patient is currently in atrial fibrillation. [id number] Score: 4. The patients heart rate in the last 24 hours is as follows:  Pulse  Min: 60  Max: 92     Antiarrhythmics  metoprolol tartrate (LOPRESSOR) tablet 25 mg, 2 times daily, Oral    Anticoagulants  apixaban tablet 2.5 mg, 2 times daily, Oral    Plan  - Replete lytes with a goal of K>4, Mg >2  - Patient is anticoagulated, see medications listed above.  - Patient's afib is currently controlled  - Continue Eliquis and Metoprolol       Chronic diastolic heart failure  Patient has Diastolic (HFpEF) heart failure that is Chronic. On presentation their CHF was well compensated. Most recent BNP and echo results are listed below.  No results for input(s): "BNP" in the last 72 hours.  Latest ECHO  Results for orders placed during the hospital encounter of 05/19/25    Echo    Interpretation Summary    Left Ventricle: The left ventricle is normal in size. Mildly increased ventricular mass. Mildly increased wall thickness. There is mild concentric hypertrophy. There is low normal systolic function with a visually estimated ejection fraction of 50 - 55%. Grade III diastolic dysfunction. Elevated left ventricular filling pressure.    Right Ventricle: The right ventricle is " normal in size Wall thickness is normal. Systolic function is borderline low. Pacemaker lead present in the ventricle.    Left Atrium: The left atrium is mildly dilated    Right Atrium: The right atrium is mildly dilated .    Aortic Valve: There is moderate aortic valve sclerosis. Moderately restricted motion. There is moderate to severe stenosis. Aortic valve area by VTI is 0.8 cm². Aortic valve peak velocity is 2.4 m/s. Mean gradient is 12 mmHg. The dimensionless index is 0.26.    Mitral Valve: Mildly calcified leaflets. There is moderate mitral annular calcification. There is mild regurgitation.    Tricuspid Valve: There is mild regurgitation.    Pulmonary Artery: There is mild pulmonary hypertension. The estimated pulmonary artery systolic pressure is 44 mmHg.    IVC/SVC: Normal venous pressure at 3 mmHg.    Current Heart Failure Medications       Plan  - Monitor strict I&Os and daily weights.    - Place on telemetry  - Low sodium diet  - Cardiology has not been consulted           Acute cystitis without hematuria  -UA with 3+ leukos  -Follow urine culture  -Continue Ceftriaxone 1 gm IV daily    Hyperphosphatemia  Patient's most recent phosphorus results are listed below.   Recent Labs     07/28/25  1022   PHOS 8.9*     Plan  - Likely due to NATHALIE  - Continue IVF  - Monitor      VTE Risk Mitigation (From admission, onward)           Ordered     apixaban tablet 2.5 mg  2 times daily         07/28/25 1315     Reason for no Mechanical VTE Prophylaxis  Once        Comments: Currently on Eliquis for PAF   Question:  Reasons:  Answer:  Physician Provided (leave comment)    07/28/25 1315     IP VTE HIGH RISK PATIENT  Once         07/28/25 1315     Place sequential compression device  Until discontinued         07/28/25 1315                             On 07/28/2025, patient should be placed in hospital observation services under Dr. Carrasquillo's care in collaboration with Megan Mathis NP.           Megan Mathis,  FNP  Department of Hospital Medicine  '[facility] - Telemetry ([facility])

## 2025-07-28 NOTE — ASSESSMENT & PLAN NOTE
Patient's most recent phosphorus results are listed below.   Recent Labs     07/28/25  1022   PHOS 8.9*     Plan  - Likely due to NATHALIE  - Continue IVF  - Monitor

## 2025-07-28 NOTE — ASSESSMENT & PLAN NOTE
Patient's blood pressure range in the last 24 hours was: BP  Min: 130/75  Max: 144/66.The patient's inpatient anti-hypertensive regimen is listed below:  Current Antihypertensives  metoprolol tartrate (LOPRESSOR) tablet 25 mg, 2 times daily, Oral    Plan  - BP is controlled, no changes needed to their regimen  - Hold Olmesartan and Furosemide due to NATHALIE

## 2025-07-28 NOTE — SUBJECTIVE & OBJECTIVE
Past Medical History:   Diagnosis Date    Age-related osteoporosis without current pathological fracture 8/20/2018    Anemia     Anxiety     Arthritis     Atrial flutter     Cancer     lymphoma Large cell B    CHF (congestive heart failure)     Chronic anemia 4/26/2017    Chronic midline low back pain with right-sided sciatica 8/20/2018    Coronary artery disease     01/2015 LHC patent LCX. 50% stenosis in LAD and RCA.      Depression     Disorder of kidney and ureter     Encounter for blood transfusion     GERD (gastroesophageal reflux disease)     Gout, arthritis     Heart failure     Hx of psychiatric care     Hyperlipidemia     Hypertension     Hypothyroidism     Immune deficiency disorder     Kidney disease     Lung nodule 2014    RML--stable    Obesity     Pacemaker     Metronic    Paroxysmal atrial fibrillation 3/15/2018    Pneumonia     Polyneuropathy     chemo induced    Psychiatric problem     Rheumatoid arthritis involving multiple sites with positive rheumatoid factor 4/19/2023    Stroke 11/16/2020    Tobacco dependence     quit 1976    Trouble in sleeping     Unstable angina 11/27/2020       Past Surgical History:   Procedure Laterality Date    APPENDECTOMY  1966 approx    CARDIAC PACEMAKER PLACEMENT  01/22/2015    CHOLECYSTECTOMY  1993    incidental at time of gastric bypass    COLON SURGERY Right 2017    hemicolectomy    COLONOSCOPY N/A 4/6/2017    Procedure: COLONOSCOPY;  Surgeon: Tye Enamorado MD;  Location: [facility] ENDO;  Service: Endoscopy;  Laterality: N/A;    COLONOSCOPY N/A 11/28/2018    Procedure: COLONOSCOPY;  Surgeon: Saúl Arthur III, MD;  Location: [facility] ENDO;  Service: Endoscopy;  Laterality: N/A;    CORONARY ANGIOPLASTY  02/2014    CORONARY STENT PLACEMENT  02/05/2014    ESOPHAGOGASTRODUODENOSCOPY N/A 11/28/2018    Procedure: EGD (ESOPHAGOGASTRODUODENOSCOPY);  Surgeon: Saúl Arthur III, MD;  Location: [facility];  Service: Endoscopy;  Laterality: N/A;    GASTRIC BYPASS  1993    with  incidental choly    HERNIA REPAIR      IMPLANTATION OF BIVENTRICULAR PERMANENT PACEMAKER AS UPGRADE TO EXISTING PACEMAKER Left 7/13/2023    Procedure: Biventricular pacemaker upgrade/His lead vs CRT-P;  Surgeon: Velasquez Vizcaino MD;  Location: [facility] CATH LAB;  Service: Cardiology;  Laterality: Left;  Will need to upgrade her pacemaker.  Ideally his pacing lead would be the best approach. MDT/ Alternatively, an upgrade /His lead as Plan A  and  CRT if fails/notified MDT rep Mell and Arik  NOT MRI safe   Pacer and leads implanted    INJECTION OF ANESTHETIC AGENT INTO SACROILIAC JOINT Right 10/8/2020    Procedure: Right BLOCK, SACROILIAC JOINT with RN IV sedation;  Surgeon: Madi Anton MD;  Location: [facility];  Service: Pain Management;  Laterality: Right;    JOINT REPLACEMENT Bilateral 2009    3 months apart    TONSILLECTOMY  1959    VENOGRAM, CATH LAB Bilateral 6/29/2023    Procedure: Venogram, Cath Lab;  Surgeon: Velasquez Vizcaino MD;  Location: [facility] CATH LAB;  Service: Cardiology;  Laterality: Bilateral;  1:00PM arrival time  NOT MRI safe   Pacer and leads implanted by [facility]ian 1/22/15   MDTR SEDR01 Sensia, [id number]   A lead: SAMEER 5076 CapSure Fix Efren, [id number]   RV lead: SAMEER 5076 CapSure Fix Efren, [id number]       Review of patient's allergies indicates:   Allergen Reactions    Corticosteroids (glucocorticoids) Nausea Only and Other (See Comments)     Stomach pain, dizziness, headache    Oxycodone Other (See Comments)     Blood pressure dropped       No current facility-administered medications on file prior to encounter.     Current Outpatient Medications on File Prior to Encounter   Medication Sig    acetaminophen (TYLENOL ARTHRITIS PAIN) 650 MG TbSR Take 650 mg by mouth every 8 (eight) hours.    allopurinoL (ZYLOPRIM) 300 MG tablet Take 1 tablet (300 mg total) by mouth once daily.    ascorbic acid, vitamin C, (VITAMIN C) 100 MG tablet Take by mouth once daily.    azelastine (ASTELIN) 137  mcg (0.1 %) nasal spray 1 spray (137 mcg total) by Nasal route 2 (two) times daily.    busPIRone (BUSPAR) 7.5 MG tablet Take 1 tablet (7.5 mg total) by mouth 2 (two) times daily.    calcitRIOL (ROCALTROL) 0.25 MCG Cap TAKE ONE CAPSULE BY MOUTH EVERY MONDAY, WEDNESDAY, AND FRIDAY    clopidogreL (PLAVIX) 75 mg tablet TAKE ONE TABLET BY MOUTH EVERY DAY    diclofenac sodium (VOLTAREN) 1 % Gel Apply 2 g topically once daily. Apply 2 g over painful joints once or twice a day.    DIPRIVAN 10 mg/mL infusion     DULoxetine (CYMBALTA) 30 MG capsule TAKE ONE CAPSULE BY MOUTH EVERY DAY    ELIQUIS 2.5 mg Tab TAKE ONE TABLET BY MOUTH TWICE DAILY    ergocalciferol (ERGOCALCIFEROL) 50,000 unit Cap TAKE ONE CAPSULE BY MOUTH EVERY 7 DAYS    fluticasone propionate (FLONASE) 50 mcg/actuation nasal spray 2 sprays (100 mcg total) by Each Nostril route once daily.    furosemide (LASIX) 40 MG tablet TAKE ONE TABLET (40MG) BY MOUTH EVERY DAY; THEN TAKE TWO TABLETS (80MG TOTAL) EVERY OTHER DAY    hydroxychloroquine (PLAQUENIL) 200 mg tablet Take 1 tablet (200 mg total) by mouth every other day.    iron-vitamin C 100-250 mg, ICAR-C, 100-250 mg Tab TAKE ONE TABLET BY MOUTH EVERY DAY    levocetirizine (XYZAL) 5 MG tablet Take 1 tablet (5 mg total) by mouth every evening.    levothyroxine (SYNTHROID) 112 MCG tablet Take 1 tablet (112 mcg total) by mouth once daily.    metoprolol tartrate (LOPRESSOR) 25 MG tablet Take 1 tablet (25 mg total) by mouth 2 (two) times daily.    multivitamin (THERAGRAN) per tablet Take 1 tablet by mouth once daily.    olmesartan (BENICAR) 20 MG tablet Take 1 tablet (20 mg total) by mouth once daily.    orphenadrine (NORFLEX) 100 mg tablet Take 1 tablet (100 mg total) by mouth 2 (two) times daily.    pravastatin (PRAVACHOL) 40 MG tablet Take 1 tablet (40 mg total) by mouth once daily.    pregabalin (LYRICA) 50 MG capsule Take 50 mg by mouth 3 (three) times daily.    sertraline (ZOLOFT) 100 MG tablet TAKE 1 & 1/2 TABLETS  BY MOUTH EVERY DAY    sodium bicarbonate 650 MG tablet TAKE ONE TABLET BY MOUTH TWICE DAILY    traMADoL (ULTRAM) 50 mg tablet Take 1 tablet (50 mg total) by mouth every 6 (six) hours as needed for Pain.    tumeric-ging-olive-oreg-capryl 100 mg-150 mg- 50 mg-150 mg Cap Take by mouth.    ZINC ACETATE ORAL Take 250 mg by mouth once daily.    glucosamine-D3-Boswellia serr (OSTEO BI-FLEX, 5-LOXIN,) 1,500-400-100 mg-unit-mg Tab Take by mouth. (Patient not taking: Reported on 2025)     Family History       Problem Relation (Age of Onset)    Asthma Daughter    COPD Daughter    Cancer Father, Sister, Brother, Son    Cataracts Mother, Brother    Drug abuse Son    Heart disease Mother    Hypertension Mother, Maternal Grandfather    Leukemia Brother    Pancreatic cancer Sister    Prostate cancer Son    Stomach cancer Father    Stroke Maternal Grandfather          Tobacco Use    Smoking status: Former     Current packs/day: 0.00     Average packs/day: 2.0 packs/day for 6.0 years (12.0 ttl pk-yrs)     Types: Cigarettes     Start date: 3/15/1970     Quit date: 3/15/1976     Years since quittin.4    Smokeless tobacco: Never   Substance and Sexual Activity    Alcohol use: No     Alcohol/week: 0.0 standard drinks of alcohol    Drug use: No    Sexual activity: Not Currently     Partners: Male     Review of Systems   Constitutional:  Positive for activity change. Negative for appetite change, chills and fever.   HENT:  Negative for trouble swallowing.    Eyes:  Negative for visual disturbance.   Respiratory:  Negative for cough, choking, chest tightness, shortness of breath and wheezing.    Cardiovascular:  Negative for chest pain and palpitations.   Gastrointestinal:  Negative for abdominal pain, constipation, diarrhea, nausea and vomiting.   Genitourinary:  Negative for difficulty urinating.   Musculoskeletal:  Negative for arthralgias.   Skin:  Negative for color change.   Neurological:  Positive for weakness. Negative for  dizziness, tremors, seizures, syncope, facial asymmetry, speech difficulty, light-headedness, numbness and headaches.   Psychiatric/Behavioral:  Negative for agitation, behavioral problems and confusion.      Objective:     Vital Signs (Most Recent):  Temp: 97.5 °F (36.4 °C) (07/28/25 0938)  Pulse: 60 (07/28/25 1224)  Resp: 18 (07/28/25 1018)  BP: 137/68 (07/28/25 1224)  SpO2: 95 % (07/28/25 1224) Vital Signs (24h Range):  Temp:  [97.5 °F (36.4 °C)] 97.5 °F (36.4 °C)  Pulse:  [60-92] 60  Resp:  [16-18] 18  SpO2:  [95 %-100 %] 95 %  BP: (130-137)/(66-75) 137/68     Weight: 55.8 kg (123 lb)  Body mass index is 21.11 kg/m².     Physical Exam  Vitals reviewed.   Constitutional:       General: She is not in acute distress.     Appearance: She is ill-appearing.   HENT:      Head: Normocephalic and atraumatic.      Mouth/Throat:      Mouth: Mucous membranes are dry.   Eyes:      Extraocular Movements: Extraocular movements intact.   Cardiovascular:      Rate and Rhythm: Normal rate. Rhythm irregular.   Pulmonary:      Effort: Pulmonary effort is normal. No respiratory distress.      Breath sounds: Normal breath sounds.   Abdominal:      General: Bowel sounds are normal. There is no distension.      Palpations: Abdomen is soft.      Tenderness: There is no abdominal tenderness.   Genitourinary:     Comments: deferred  Musculoskeletal:         General: Normal range of motion.      Cervical back: Normal range of motion.   Skin:     General: Skin is warm and dry.      Capillary Refill: Capillary refill takes less than 2 seconds.      Coloration: Skin is pale.   Neurological:      Mental Status: She is alert and oriented to person, place, and time.   Psychiatric:         Mood and Affect: Mood normal.         Behavior: Behavior normal.         Thought Content: Thought content normal.                Significant Labs: All pertinent labs within the past 24 hours have been reviewed.  CBC:   Recent Labs   Lab 07/28/25  1012   WBC  5.98   HGB 11.0*   HCT 33.3*        CMP:   Recent Labs   Lab 07/28/25  1012      K 3.5   *   CO2 11*   GLU 88   BUN 83*   CREATININE 3.5*   CALCIUM 7.6*   PROT 6.8   ALBUMIN 3.9   BILITOT 0.4   ALKPHOS 49   AST 54*   ALT 29   ANIONGAP 16     Lactic Acid:   Recent Labs   Lab 07/28/25  1105   LACTATE 0.8     Magnesium:   Recent Labs   Lab 07/28/25  1022   MG 2.0     Urine Studies:   Recent Labs   Lab 07/28/25  1105   COLORU Yellow   APPEARANCEUA Clear   PHUR 6.0   SPECGRAV 1.010   PROTEINUA Trace*   GLUCUA Negative   BILIRUBINUA Negative   OCCULTUA 1+*   NITRITE Negative   UROBILINOGEN Negative   LEUKOCYTESUR 3+*   RBCUA 1   WBCUA 3   BACTERIA Rare       Significant Imaging:   Imaging Results              US Kidney (Final result)  Result time 07/28/25 12:13:21      Final result by [facility] (07/28/25 12:13:21)                   Impression:      No significant hydronephrosis.  1.3 cm right upper pole renal cyst.      Electronically signed by: Sulema Ochoa MD  Date:    07/28/2025  Time:    12:13               Narrative:    EXAMINATION:  US KIDNEY    CLINICAL HISTORY:  acute renal failure;    COMPARISON:  None    FINDINGS:  Right kidney measures 9.2 cm in length, maintaining normal cortical thickness and echotexture with no hydronephrosis.  1.3 cm cyst upper pole right kidney.    A 9.0 cm left kidney also maintains normal cortical thickness and echotexture  with no hydronephrosis.    Bladder appears unremarkable.                                       X-Ray Chest AP Portable (Final result)  Result time 07/28/25 11:00:40      Final result by Vinnie Amezcua MD (07/28/25 11:00:40)                   Impression:      1.  Negative for acute process involving the chest.    2.  Stable findings as noted above.      Electronically signed by: Vinnie Amezcua MD  Date:    07/28/2025  Time:    11:00               Narrative:    EXAMINATION:  XR CHEST AP PORTABLE    CLINICAL HISTORY:  Chronic  atrial fibrillation, unspecified    COMPARISON:  Studies dating back to November 5, 2021    FINDINGS:  The study is lordotic in position.  EKG leads overlie the lungs are clear. The cardiac silhouette size is normal. The trachea is midline and the mediastinal width is normal. Negative for focal infiltrate, effusion or pneumothorax. Pulmonary vasculature is normal. Negative for osseous abnormalities. Three lead left subclavian pacemaker/ICD device again seen.  Tortuous aorta with calcifications of the aortic knob.  There are degenerative changes of the spine and both shoulder girdles.  Convex right curvature of the upper thoracic spine.  Ring like device in the left upper quadrant with other postoperative changes peer

## 2025-07-28 NOTE — ASSESSMENT & PLAN NOTE
NATHALIE is likely due to pre-renal azotemia due to intravascular volume depletion. Baseline creatinine is around 1.9. Most recent creatinine and eGFR are listed below.  Recent Labs     07/28/25  1012   CREATININE 3.5*   EGFRNORACEVR 12*      Plan  - NATHALIE is improving  - Avoid nephrotoxins and renally dose meds for GFR listed above  - Monitor urine output, serial BMP, and adjust therapy as needed  - Renal US showed no significant hydronephrosis. 1.3 cm right upper pole renal cyst.  - IVF

## 2025-07-28 NOTE — ASSESSMENT & PLAN NOTE
Patient has permanent atrial fibrillation. Patient is currently in atrial fibrillation. [id number] Score: 4. The patients heart rate in the last 24 hours is as follows:  Pulse  Min: 60  Max: 92     Antiarrhythmics  metoprolol tartrate (LOPRESSOR) tablet 25 mg, 2 times daily, Oral    Anticoagulants  apixaban tablet 2.5 mg, 2 times daily, Oral    Plan  - Replete lytes with a goal of K>4, Mg >2  - Patient is anticoagulated, see medications listed above.  - Patient's afib is currently controlled  - Continue Eliquis and Metoprolol

## 2025-07-28 NOTE — FIRST PROVIDER EVALUATION
Medical screening examination initiated.  I have conducted a focused provider triage encounter, findings are as follows:    Brief history of present illness:  Sent for further evaluation of elevated creatinine    There were no vitals filed for this visit.    Pertinent physical exam:  No acute distress, patient is alert and oriented    Brief workup plan:  Repeat labs and further evaluation once roomed    Preliminary workup initiated; this workup will be continued and followed by the physician or advanced practice provider that is assigned to the patient when roomed.

## 2025-07-28 NOTE — ASSESSMENT & PLAN NOTE
Lab Results   Component Value Date    CHOL 91 (L) 07/25/2025    HDL 47 07/25/2025    LDLCALC 21.2 (L) 07/25/2025    TRIG 114 07/25/2025     -continue statin therapy
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
## 2025-07-29 LAB
ABSOLUTE EOSINOPHIL (OHS): 0.13 K/UL
ABSOLUTE MONOCYTE (OHS): 0.5 K/UL (ref 0.3–1)
ABSOLUTE NEUTROPHIL COUNT (OHS): 4.44 K/UL (ref 1.8–7.7)
ALBUMIN SERPL BCP-MCNC: 3.3 G/DL (ref 3.5–5.2)
ALBUMIN SERPL BCP-MCNC: 3.3 G/DL (ref 3.5–5.2)
ALP SERPL-CCNC: 36 UNIT/L (ref 40–150)
ALP SERPL-CCNC: 38 UNIT/L (ref 40–150)
ALT SERPL W/O P-5'-P-CCNC: 24 UNIT/L (ref 10–44)
ALT SERPL W/O P-5'-P-CCNC: 25 UNIT/L (ref 10–44)
ANION GAP (OHS): 14 MMOL/L (ref 8–16)
ANION GAP (OHS): 14 MMOL/L (ref 8–16)
AST SERPL-CCNC: 39 UNIT/L (ref 11–45)
AST SERPL-CCNC: 40 UNIT/L (ref 11–45)
BASOPHILS # BLD AUTO: 0.02 K/UL
BASOPHILS NFR BLD AUTO: 0.3 %
BILIRUB SERPL-MCNC: 0.3 MG/DL (ref 0.1–1)
BILIRUB SERPL-MCNC: 0.3 MG/DL (ref 0.1–1)
BUN SERPL-MCNC: 74 MG/DL (ref 8–23)
BUN SERPL-MCNC: 74 MG/DL (ref 8–23)
CALCIUM SERPL-MCNC: 7.1 MG/DL (ref 8.7–10.5)
CALCIUM SERPL-MCNC: 7.2 MG/DL (ref 8.7–10.5)
CHLORIDE SERPL-SCNC: 119 MMOL/L (ref 95–110)
CHLORIDE SERPL-SCNC: 119 MMOL/L (ref 95–110)
CO2 SERPL-SCNC: 10 MMOL/L (ref 23–29)
CO2 SERPL-SCNC: 11 MMOL/L (ref 23–29)
CREAT SERPL-MCNC: 3 MG/DL (ref 0.5–1.4)
CREAT SERPL-MCNC: 3 MG/DL (ref 0.5–1.4)
ERYTHROCYTE [DISTWIDTH] IN BLOOD BY AUTOMATED COUNT: 16.6 % (ref 11.5–14.5)
GFR SERPLBLD CREATININE-BSD FMLA CKD-EPI: 15 ML/MIN/1.73/M2
GFR SERPLBLD CREATININE-BSD FMLA CKD-EPI: 15 ML/MIN/1.73/M2
GLUCOSE SERPL-MCNC: 91 MG/DL (ref 70–110)
GLUCOSE SERPL-MCNC: 92 MG/DL (ref 70–110)
HCT VFR BLD AUTO: 30.4 % (ref 37–48.5)
HGB BLD-MCNC: 10.1 GM/DL (ref 12–16)
HOLD SPECIMEN: NORMAL
IMM GRANULOCYTES # BLD AUTO: 0.03 K/UL (ref 0–0.04)
IMM GRANULOCYTES NFR BLD AUTO: 0.4 % (ref 0–0.5)
LYMPHOCYTES # BLD AUTO: 1.92 K/UL (ref 1–4.8)
MAGNESIUM SERPL-MCNC: 1.8 MG/DL (ref 1.6–2.6)
MCH RBC QN AUTO: 34.5 PG (ref 27–31)
MCHC RBC AUTO-ENTMCNC: 33.2 G/DL (ref 32–36)
MCV RBC AUTO: 104 FL (ref 82–98)
NUCLEATED RBC (/100WBC) (OHS): 0 /100 WBC
OHS QRS DURATION: 112 MS
OHS QTC CALCULATION: 462 MS
PHOSPHATE SERPL-MCNC: 8 MG/DL (ref 2.7–4.5)
PLATELET # BLD AUTO: 133 K/UL (ref 150–450)
PMV BLD AUTO: 10.3 FL (ref 9.2–12.9)
POTASSIUM SERPL-SCNC: 3.3 MMOL/L (ref 3.5–5.1)
POTASSIUM SERPL-SCNC: 3.6 MMOL/L (ref 3.5–5.1)
PROT SERPL-MCNC: 5.8 GM/DL (ref 6–8.4)
PROT SERPL-MCNC: 5.9 GM/DL (ref 6–8.4)
RBC # BLD AUTO: 2.93 M/UL (ref 4–5.4)
RELATIVE EOSINOPHIL (OHS): 1.8 %
RELATIVE LYMPHOCYTE (OHS): 27.3 % (ref 18–48)
RELATIVE MONOCYTE (OHS): 7.1 % (ref 4–15)
RELATIVE NEUTROPHIL (OHS): 63.1 % (ref 38–73)
SODIUM SERPL-SCNC: 143 MMOL/L (ref 136–145)
SODIUM SERPL-SCNC: 144 MMOL/L (ref 136–145)
WBC # BLD AUTO: 7.04 K/UL (ref 3.9–12.7)

## 2025-07-29 PROCEDURE — 97530 THERAPEUTIC ACTIVITIES: CPT | Mod: HCNC

## 2025-07-29 PROCEDURE — 36415 COLL VENOUS BLD VENIPUNCTURE: CPT | Mod: HCNC | Performed by: NURSE PRACTITIONER

## 2025-07-29 PROCEDURE — 84460 ALANINE AMINO (ALT) (SGPT): CPT | Mod: HCNC | Performed by: NURSE PRACTITIONER

## 2025-07-29 PROCEDURE — 97162 PT EVAL MOD COMPLEX 30 MIN: CPT | Mod: HCNC

## 2025-07-29 PROCEDURE — 80053 COMPREHEN METABOLIC PANEL: CPT | Mod: 91,HCNC | Performed by: NURSE PRACTITIONER

## 2025-07-29 PROCEDURE — 97166 OT EVAL MOD COMPLEX 45 MIN: CPT | Mod: HCNC

## 2025-07-29 PROCEDURE — 63600175 PHARM REV CODE 636 W HCPCS: Mod: HCNC | Performed by: NURSE PRACTITIONER

## 2025-07-29 PROCEDURE — 21400001 HC TELEMETRY ROOM: Mod: HCNC

## 2025-07-29 PROCEDURE — 85025 COMPLETE CBC W/AUTO DIFF WBC: CPT | Mod: HCNC | Performed by: NURSE PRACTITIONER

## 2025-07-29 PROCEDURE — 94761 N-INVAS EAR/PLS OXIMETRY MLT: CPT | Mod: HCNC

## 2025-07-29 PROCEDURE — 96361 HYDRATE IV INFUSION ADD-ON: CPT

## 2025-07-29 PROCEDURE — 96376 TX/PRO/DX INJ SAME DRUG ADON: CPT

## 2025-07-29 PROCEDURE — 83735 ASSAY OF MAGNESIUM: CPT | Mod: HCNC | Performed by: NURSE PRACTITIONER

## 2025-07-29 PROCEDURE — 25000003 PHARM REV CODE 250: Mod: HCNC | Performed by: NURSE PRACTITIONER

## 2025-07-29 PROCEDURE — 84100 ASSAY OF PHOSPHORUS: CPT | Mod: HCNC | Performed by: NURSE PRACTITIONER

## 2025-07-29 RX ADMIN — SODIUM BICARBONATE 650 MG: 650 TABLET ORAL at 08:07

## 2025-07-29 RX ADMIN — SERTRALINE HYDROCHLORIDE 150 MG: 50 TABLET ORAL at 08:07

## 2025-07-29 RX ADMIN — LEVOTHYROXINE SODIUM 112 MCG: 0.11 TABLET ORAL at 06:07

## 2025-07-29 RX ADMIN — TRAMADOL HYDROCHLORIDE 50 MG: 50 TABLET, COATED ORAL at 08:07

## 2025-07-29 RX ADMIN — APIXABAN 2.5 MG: 2.5 TABLET, FILM COATED ORAL at 08:07

## 2025-07-29 RX ADMIN — CLOPIDOGREL 75 MG: 75 TABLET ORAL at 08:07

## 2025-07-29 RX ADMIN — SODIUM CHLORIDE: 9 INJECTION, SOLUTION INTRAVENOUS at 08:07

## 2025-07-29 RX ADMIN — CEFTRIAXONE 1 G: 1 INJECTION, POWDER, FOR SOLUTION INTRAMUSCULAR; INTRAVENOUS at 08:07

## 2025-07-29 RX ADMIN — METOPROLOL TARTRATE 25 MG: 25 TABLET, FILM COATED ORAL at 08:07

## 2025-07-29 RX ADMIN — PRAVASTATIN SODIUM 40 MG: 20 TABLET ORAL at 08:07

## 2025-07-29 RX ADMIN — HYDROXYCHLOROQUINE SULFATE 200 MG: 200 TABLET, FILM COATED ORAL at 08:07

## 2025-07-29 NOTE — PLAN OF CARE
PT EVAL complete. Required SBA for bed mobility ambulated 20ft CGA using SPC. Recommending low intensity therapy upon d/c.

## 2025-07-29 NOTE — PLAN OF CARE
A243/A243 KEELEYLino Swenson is a 85 y.o.female admitted on 7/28/2025 for Acute on chronic kidney failure   Code Status: Full Code MRN: [id number]   Review of patient's allergies indicates:   Allergen Reactions    Corticosteroids (glucocorticoids) Nausea Only and Other (See Comments)     Stomach pain, dizziness, headache    Oxycodone Other (See Comments)     Blood pressure dropped     Past Medical History:   Diagnosis Date    Age-related osteoporosis without current pathological fracture 8/20/2018    Anemia     Anxiety     Arthritis     Atrial flutter     Cancer     lymphoma Large cell B    CHF (congestive heart failure)     Chronic anemia 4/26/2017    Chronic midline low back pain with right-sided sciatica 8/20/2018    Coronary artery disease     01/2015 LHC patent LCX. 50% stenosis in LAD and RCA.      Depression     Disorder of kidney and ureter     Encounter for blood transfusion     GERD (gastroesophageal reflux disease)     Gout, arthritis     Heart failure     Hx of psychiatric care     Hyperlipidemia     Hypertension     Hypothyroidism     Immune deficiency disorder     Kidney disease     Lung nodule 2014    RML--stable    Obesity     Pacemaker     Metronic    Paroxysmal atrial fibrillation 3/15/2018    Pneumonia     Polyneuropathy     chemo induced    Psychiatric problem     Rheumatoid arthritis involving multiple sites with positive rheumatoid factor 4/19/2023    Stroke 11/16/2020    Tobacco dependence     quit 1976    Trouble in sleeping     Unstable angina 11/27/2020      PRN meds    acetaminophen, 650 mg, Q8H PRN  ondansetron, 4 mg, Q8H PRN  sodium chloride 0.9%, 10 mL, PRN  traMADoL, 50 mg, Q12H PRN      Chart check completed. Will continue plan of care.         [place] Coma Scale Score: 15     Lead Monitored: Lead II Rhythm: paced rhythm Frequency/Ectopy: PVCs  Cardiac/Telemetry Box Number: [id number]  VTE Core Measure: (SCDs) Sequential compression device initiated/maintained Last Bowel Movement:  07/28/25  Diet Low Sodium (2 gm) Standard Tray  Voiding Characteristics: oliguria  Andrez Score: 19  Fall Risk Score: 23  Accucheck []   Freq?      Lines/Drains/Airways       Peripheral Intravenous Line  Duration             Peripheral IV 07/28/25 2000 22 G Anterior;Left;Proximal Forearm <1 day

## 2025-07-29 NOTE — PROGRESS NOTES
[facility] Medicine  Progress Note    Patient Name: Violet Swenson  MRN: [id number]  Patient Class: IP- Inpatient   Admission Date: 7/28/2025  Length of Stay: 0 days  Attending Physician: Cliff Berumen MD  Primary Care Provider: Marti Coronel MD        Subjective     Principal Problem:Acute on chronic kidney failure        HPI:  Violet Swenson is a 85 year old female who  has a past medical history of Age-related osteoporosis without current pathological fracture, Anemia, Anxiety, Arthritis, Atrial flutter, Cancer, CHF (congestive heart failure), Chronic anemia, Chronic midline low back pain with right-sided sciatica, Coronary artery disease, Depression, Disorder of kidney and ureter, Encounter for blood transfusion, GERD (gastroesophageal reflux disease), Gout, arthritis, Heart failure, psychiatric care, Hyperlipidemia, Hypertension, Hypothyroidism, Immune deficiency disorder, Kidney disease, Lung nodule, Obesity, Pacemaker, Paroxysmal atrial fibrillation, Pneumonia, Polyneuropathy, Psychiatric problem, Rheumatoid arthritis involving multiple sites with positive rheumatoid factor, Stroke, Tobacco dependence, Trouble in sleeping, and Unstable angina who presented to the Emergency Department for evaluation of abnormal lab values. She had labs drawn on 7/25 for pending angiogram. Creatinine noted to be 4.1. She was called this morning and informed to go to ED for evaluation. She is followed by Dr. Gil outpatient and plans for cardiac cath were in place.    In ED, WBC count 5.98K, Hgb/Hct 11.0/33.3, CO2 11, BUN 83, Creatinine 3.5, Phosphorus 8.9, lactic acid 0.8. UA showed 1+ blood and 3+ leukos. CXR with no acute findings. Renal US showed no significant hydronephrosis. 1.3 cm right upper pole renal cyst. She received  mL bolus and one time dose of Ceftriaxone 1 gm IV. Following a comprehensive evaluation of the patient's clinical presentation, vital  signs, laboratory results, and risk factors, myself with the attending made  the active decision to admit the patient for further monitoring, diagnostic work-up, and initiation of appropriate treatment, given the potential for clinical deterioration if managed in an outpatient setting.     Overview/Hospital Course:  Patient sent from PCP office for NATHALIE.  Renal US showed 1.3 cm right upper pole cyst, but no hydronephrosis.  Renal function improving with IV fluids.  Patient had a fall on 7/29 and stated that she thinks she hit her head on the back.  No LOC, visual disturbance, nausea/vomiting, or HA.  CT head showed no acute findings.    Interval History: Follow up for NATHALIE.  Patient's renal function is improving.  She had a fall this morning with no deficits.  She states that she does fall at home and normally uses a Rolator and cane to ambulate.  She was doing neither at the time of the fall.    Review of Systems  Objective:     Vital Signs (Most Recent):  Temp: 97.5 °F (36.4 °C) (07/29/25 1229)  Pulse: 60 (07/29/25 1229)  Resp: 18 (07/29/25 1229)  BP: (!) 160/79 (07/29/25 1229)  SpO2: 99 % (07/29/25 1229) Vital Signs (24h Range):  Temp:  [97.4 °F (36.3 °C)-97.6 °F (36.4 °C)] 97.5 °F (36.4 °C)  Pulse:  [58-68] 60  Resp:  [16-20] 18  SpO2:  [95 %-100 %] 99 %  BP: (112-182)/(58-92) 160/79     Weight: 58.1 kg (128 lb 1.4 oz)  Body mass index is 21.99 kg/m².    Intake/Output Summary (Last 24 hours) at 7/29/2025 1532  Last data filed at 7/29/2025 1015  Gross per 24 hour   Intake 791.51 ml   Output 500 ml   Net 291.51 ml         Physical Exam  Vitals and nursing note reviewed.   Constitutional:       Appearance: Normal appearance.   HENT:      Head: Normocephalic and atraumatic.      Nose: Nose normal.      Mouth/Throat:      Mouth: Mucous membranes are moist.   Eyes:      Extraocular Movements: Extraocular movements intact.      Conjunctiva/sclera: Conjunctivae normal.   Cardiovascular:      Rate and Rhythm: Normal rate  and regular rhythm.      Pulses: Normal pulses.      Heart sounds: Normal heart sounds.   Pulmonary:      Effort: Pulmonary effort is normal.      Breath sounds: Normal breath sounds.   Abdominal:      General: Abdomen is flat. Bowel sounds are normal.      Palpations: Abdomen is soft.   Musculoskeletal:         General: Normal range of motion.      Cervical back: Normal range of motion and neck supple.   Skin:     General: Skin is warm.      Capillary Refill: Capillary refill takes less than 2 seconds.   Neurological:      Mental Status: She is alert and oriented to person, place, and time. Mental status is at baseline.   Psychiatric:         Mood and Affect: Mood normal.         Behavior: Behavior normal.               Significant Labs: All pertinent labs within the past 24 hours have been reviewed.  Recent Lab Results         07/29/25  0218   07/29/25  0147        Albumin 3.3   3.3       ALP 36   38       ALT 24   25       Anion Gap 14   14       AST 39   40       Baso # 0.02         Basophil % 0.3         BILIRUBIN TOTAL 0.3  Comment: For infants and newborns, interpretation of results should be based   on gestational age, weight and in agreement with clinical   observations.    Premature Infant recommended reference ranges:   0-24 hours:  <8.0 mg/dL   24-48 hours: <12.0 mg/dL   3-5 days:    <15.0 mg/dL   6-29 days:   <15.0 mg/dL   0.3  Comment: For infants and newborns, interpretation of results should be based   on gestational age, weight and in agreement with clinical   observations.    Premature Infant recommended reference ranges:   0-24 hours:  <8.0 mg/dL   24-48 hours: <12.0 mg/dL   3-5 days:    <15.0 mg/dL   6-29 days:   <15.0 mg/dL       BUN 74   74       Calcium 7.2   7.1       Chloride 119   119       CO2 11   10       Creatinine 3.0   3.0       eGFR 15  Comment: Estimated GFR calculated using the CKD-EPI creatinine (2021) equation.   15  Comment: Estimated GFR calculated using the CKD-EPI creatinine  (2021) equation.       Eos # 0.13         Eos % 1.8         Glucose 92   91       Gran # (ANC) 4.44         Hematocrit 30.4         Hemoglobin 10.1         Immature Grans (Abs) 0.03  Comment: Mild elevation in immature granulocytes is non specific and can be seen in a variety of conditions including stress response, acute inflammation, trauma and pregnancy. Correlation with other laboratory and clinical findings is essential.         Immature Granulocytes 0.4         Lymph # 1.92         Lymph % 27.3         Magnesium    1.8       MCH 34.5         MCHC 33.2                  Mono # 0.50         Mono % 7.1         MPV 10.3         Neut % 63.1         nRBC 0         Phosphorus Level   8.0       Platelet Count 133         Potassium 3.6   3.3       PROTEIN TOTAL 5.9   5.8       RBC 2.93         RDW 16.6         Sodium 144   143       WBC 7.04                 Significant Imaging: I have reviewed all pertinent imaging results/findings within the past 24 hours.      Assessment & Plan  Acute on chronic kidney failure  NATHALIE is likely due to pre-renal azotemia due to intravascular volume depletion. Baseline creatinine is around 1.9. Most recent creatinine and eGFR are listed below.  Recent Labs     07/28/25  1012 07/29/25  0147 07/29/25  0218   CREATININE 3.5* 3.0* 3.0*   EGFRNORACEVR 12* 15* 15*      Plan  - NATHALIE is improving  - Avoid nephrotoxins and renally dose meds for GFR listed above  - Monitor urine output, serial BMP, and adjust therapy as needed  - Renal US showed no significant hydronephrosis. 1.3 cm right upper pole renal cyst.  - IVF   - Improving  Essential hypertension  Patient's blood pressure range in the last 24 hours was: BP  Min: 112/60  Max: 182/84.The patient's inpatient anti-hypertensive regimen is listed below:  Current Antihypertensives  metoprolol tartrate (LOPRESSOR) tablet 25 mg, 2 times daily, Oral    Plan  - BP is controlled, no changes needed to their regimen  - Hold Olmesartan and  "Furosemide due to NATHALIE  Mixed hyperlipidemia  Lab Results   Component Value Date    CHOL 91 (L) 07/25/2025    HDL 47 07/25/2025    LDLCALC 21.2 (L) 07/25/2025    TRIG 114 07/25/2025     -continue statin therapy  Hypothyroidism (acquired)  Lab Results   Component Value Date    TSH 3.585 07/25/2025    TSH 6.633 (H) 01/25/2025    TSH 5.875 (H) 06/19/2024      -Continue Levothyroxine  Permanent atrial fibrillation  Patient has permanent atrial fibrillation. Patient is currently in atrial fibrillation. [id number] Score: 4. The patients heart rate in the last 24 hours is as follows:  Pulse  Min: 58  Max: 68     Antiarrhythmics  metoprolol tartrate (LOPRESSOR) tablet 25 mg, 2 times daily, Oral    Anticoagulants  apixaban tablet 2.5 mg, 2 times daily, Oral    Plan  - Replete lytes with a goal of K>4, Mg >2  - Patient is anticoagulated, see medications listed above.  - Patient's afib is currently controlled  - Continue Eliquis and Metoprolol       Chronic diastolic heart failure  Patient has Diastolic (HFpEF) heart failure that is Chronic. On presentation their CHF was well compensated. Most recent BNP and echo results are listed below.  No results for input(s): "BNP" in the last 72 hours.  Latest ECHO  Results for orders placed during the hospital encounter of 05/19/25    Echo    Interpretation Summary    Left Ventricle: The left ventricle is normal in size. Mildly increased ventricular mass. Mildly increased wall thickness. There is mild concentric hypertrophy. There is low normal systolic function with a visually estimated ejection fraction of 50 - 55%. Grade III diastolic dysfunction. Elevated left ventricular filling pressure.    Right Ventricle: The right ventricle is normal in size Wall thickness is normal. Systolic function is borderline low. Pacemaker lead present in the ventricle.    Left Atrium: The left atrium is mildly dilated    Right Atrium: The right atrium is mildly dilated .    Aortic Valve: There is " moderate aortic valve sclerosis. Moderately restricted motion. There is moderate to severe stenosis. Aortic valve area by VTI is 0.8 cm². Aortic valve peak velocity is 2.4 m/s. Mean gradient is 12 mmHg. The dimensionless index is 0.26.    Mitral Valve: Mildly calcified leaflets. There is moderate mitral annular calcification. There is mild regurgitation.    Tricuspid Valve: There is mild regurgitation.    Pulmonary Artery: There is mild pulmonary hypertension. The estimated pulmonary artery systolic pressure is 44 mmHg.    IVC/SVC: Normal venous pressure at 3 mmHg.    Current Heart Failure Medications       Plan  - Monitor strict I&Os and daily weights.    - Place on telemetry  - Low sodium diet  - Cardiology has not been consulted           Acute cystitis without hematuria  -UA with 3+ leukos  -No urine cx reflexed  -Continue Ceftriaxone 1 gm IV daily (D2/3)    Hyperphosphatemia  Patient's most recent phosphorus results are listed below.   Recent Labs     07/28/25  1022 07/29/25  0147   PHOS 8.9* 8.0*     Plan  - Likely due to NATHALIE  - Continue IVF  - Monitor  - Improving    Accident due to mechanical fall without injury  Patient has issues with falls at home.  PT/OT recommended home with .  Consult for CM placed.  VTE Risk Mitigation (From admission, onward)           Ordered     apixaban tablet 2.5 mg  2 times daily         07/28/25 1315     Reason for no Mechanical VTE Prophylaxis  Once        Comments: Currently on Eliquis for PAF   Question:  Reasons:  Answer:  Physician Provided (leave comment)    07/28/25 1315     IP VTE HIGH RISK PATIENT  Once         07/28/25 1315     Place sequential compression device  Until discontinued         07/28/25 1315                    Discharge Planning   LADARIUS: 7/30/2025     Code Status: Full Code   Medical Readiness for Discharge Date:                      Please place Justification for DME      Cliff Berumen MD  Department of Hospital Medicine   [facility] - Telemetry  ([facility])

## 2025-07-29 NOTE — HOSPITAL COURSE
Patient sent from PCP office for NATHALIE.  Renal US showed 1.3 cm right upper pole cyst, but no hydronephrosis.  Renal function improving with IV fluids.  Patient had a fall on 7/29 and stated that she thinks she hit her head on the back.  No LOC, visual disturbance, nausea/vomiting, or HA.  CT head showed no acute findings.  Patient renal function continues to improve and she was seen by Nephrology who recommended continuing fluids overnight to see if the renal function begins to improve again.  Creatinine on day of discharge is 2.2 with a baseline of 1.7-1.9.  Patient already has an established appointment with Dr. Goetz on 08/13/2025 and will be having repeat blood work done on 08/04/2025 with [facility] to be sent to Dr. Goetz's office.  Of note, patient's potassium was elevated but that was likely due to replacement given yesterday from her hypokalemia.  This is already started to improve with repeat lab work done at 2:00 p.m..  Potassium at this time is 5.6 and patient has remained on telemetry monitor without any abnormality.  This will be sent to Dr. Goetz as well on Monday with likely improvement as she will not be on any supplemental potassium upon discharge.  She will also need to follow up with the primary care doctor for hospital follow up.  Patient has no additional complaints and we will be discharged this afternoon in stable condition.

## 2025-07-29 NOTE — ASSESSMENT & PLAN NOTE
Patient has permanent atrial fibrillation. Patient is currently in atrial fibrillation. [id number] Score: 4. The patients heart rate in the last 24 hours is as follows:  Pulse  Min: 58  Max: 68     Antiarrhythmics  metoprolol tartrate (LOPRESSOR) tablet 25 mg, 2 times daily, Oral    Anticoagulants  apixaban tablet 2.5 mg, 2 times daily, Oral    Plan  - Replete lytes with a goal of K>4, Mg >2  - Patient is anticoagulated, see medications listed above.  - Patient's afib is currently controlled  - Continue Eliquis and Metoprolol

## 2025-07-29 NOTE — PLAN OF CARE
[facility] - Telemetry (Hospital)  Initial Discharge Assessment       Primary Care Provider: Marti Coronel MD    Admission Diagnosis: Chronic atrial fibrillation [I48.20]  Intravascular volume depletion [E86.1]  Acute cystitis without hematuria [N30.00]  Acute renal failure superimposed on stage 4 chronic kidney disease, unspecified acute renal failure type [N17.9, N18.4]    Admission Date: 7/28/2025  Expected Discharge Date: 7/30/2025    Transition of Care Barriers: None    Payor: Eligible MEDICARE / Plan: [employer] MEDICARE HMO / Product Type: Capitation /     Extended Emergency Contact Information  Primary Emergency Contact: Leah Almaraz  Mobile Phone: [phone]  Relation: Daughter  Secondary Emergency Contact: Lenin Swenson   [place]  Mobile Phone: [phone]  Relation: Spouse    Discharge Plan A: Home, Home Health         [facility] [place], [place] - [address]   [address] [facility] [address]  Phone: [phone] Fax: [phone]      Initial Assessment (most recent)       Adult Discharge Assessment - 07/29/25 1650          Discharge Assessment    Assessment Type Discharge Planning Assessment     Confirmed/corrected address, phone number and insurance Yes     Confirmed Demographics Correct on Facesheet     Source of Information patient     People in Home alone     Facility Arrived From: Home     Do you expect to return to your current living situation? Yes     Do you have help at home or someone to help you manage your care at home? Yes     Who are your caregiver(s) and their phone number(s)? Son     Prior to hospitilization cognitive status: Alert/Oriented     Current cognitive status: Alert/Oriented     Walking or Climbing Stairs Difficulty yes     Walking or Climbing Stairs ambulation difficulty, requires equipment     Mobility Management cane and rollator     Equipment Currently Used at Home cane, straight;rollator     Readmission within 30 days? No      Patient currently being followed by outpatient case management? No     Do you currently have service(s) that help you manage your care at home? No     Do you take prescription medications? Yes     Do you have prescription coverage? Yes     Do you have any problems affording any of your prescribed medications? No     Is the patient taking medications as prescribed? yes     Who is going to help you get home at discharge? Son     How do you get to doctors appointments? family or friend will provide;car, drives self     Are you on dialysis? No     Discharge Plan A Home;Home Health     DME Needed Upon Discharge  none     Discharge Plan discussed with: Patient     Transition of Care Barriers None                   Anticipated DC dispo: Home; home health   Prior Level of Function: Lives at home alone; independent with Adls; uses a cane and rollator. Pt still drives and son will be transport home   PCP: Homer      Comments: Agreeable to home health at DC. SW to arrange prior to DC. Pt's whiteboard updated with CM contact and anticipated discharge disposition. SW to remain available as needed.

## 2025-07-29 NOTE — PLAN OF CARE
Inpatient Upgrade Note    Violet Swenson has warranted treatment spanning two or more midnights of hospital level care for the management of NATHALIE. She continues to require IV antibiotics, IV fluids, daily labs, further testing/imaging, monitoring of vital signs, medication adjustments, and further evaluation by consultants. Her condition is also complicated by the following comorbidities: Age-related osteoporosis without current pathological fracture, Anemia, Anxiety, Arthritis, Atrial flutter, Cancer, CHF (congestive heart failure), Chronic anemia, Chronic midline low back pain with right-sided sciatica, Coronary artery disease, Depression, Disorder of kidney and ureter, Encounter for blood transfusion, GERD (gastroesophageal reflux disease), Gout, arthritis, Heart failure, psychiatric care, Hyperlipidemia, Hypertension, Hypothyroidism, Immune deficiency disorder, Kidney disease, Lung nodule, Obesity, Pacemaker, Paroxysmal atrial fibrillation, Pneumonia, Polyneuropathy, Psychiatric problem, Rheumatoid arthritis involving multiple sites with positive rheumatoid factor, Stroke, Tobacco dependence, Trouble in sleeping, and Unstable angina .

## 2025-07-29 NOTE — ASSESSMENT & PLAN NOTE
Patient's most recent phosphorus results are listed below.   Recent Labs     07/28/25  1022 07/29/25  0147   PHOS 8.9* 8.0*     Plan  - Likely due to NATHALIE  - Continue IVF  - Monitor  - Improving

## 2025-07-29 NOTE — PLAN OF CARE
OT lindsay completed. Recommends low intensity therapy.  SBA for bed mobility. CGA for t/fs and ambulation 20ft with SPC.

## 2025-07-29 NOTE — PT/OT/SLP EVAL
"Occupational Therapy   Evaluation    Name: Violet Swenson  MRN: [id number]  Admitting Diagnosis: Acute on chronic kidney failure  Recent Surgery: * No surgery found *      Recommendations:     Discharge Recommendations: Low Intensity Therapy  Discharge Equipment Recommendations:  to be determined by next level of care  Barriers to discharge:  Decreased caregiver support    Assessment:     Violet Swenson is a 85 y.o. female with a medical diagnosis of Acute on chronic kidney failure.  She presents with the following performance deficits affecting function: weakness, impaired endurance, impaired sensation, impaired self care skills, impaired functional mobility, gait instability, impaired balance, decreased upper extremity function, decreased lower extremity function, decreased safety awareness, decreased ROM, impaired cardiopulmonary response to activity.      Rehab Prognosis: Good; patient would benefit from acute skilled OT services to address these deficits and reach maximum level of function.       Plan:     Patient to be seen 2 x/week to address the above listed problems via self-care/home management, therapeutic activities, therapeutic exercises  Plan of Care Expires: 08/12/25  Plan of Care Reviewed with: patient    Subjective     Chief Complaint: Weakness; "I'm shaky."  Patient/Family Comments/goals: improve strength and mobility    Occupational Profile:  Living Environment: lives alone in a trailer home with 5 steps and B rails to enter. Walk-in shower.  Previous level of function: Pt (I) with ADLs and Mod (I) with functional mobility using SPC for household distances and rollator for community distances.  Roles and Routines: drives, shops, and is retired. Pt cares for her son who is a cancer patient.  Equipment Used at Home: rollator, cane, straight  Assistance upon Discharge: unknown    Pain/Comfort:  Pain Rating 1: 0/10    Objective:     Communicated with: Epic chart review prior to " session.  Patient found HOB elevated with peripheral IV, telemetry, Other (comments) (AVASYS) upon OT entry to room.    General Precautions: Standard, fall  Orthopedic Precautions: N/A  Braces: N/A  Respiratory Status: Room air    Occupational Performance:    Bed Mobility:    Patient completed Rolling/Turning to Right with stand by assistance  Patient completed Scooting/Bridging with stand by assistance  Patient completed Supine to Sit with stand by assistance    Functional Mobility/Transfers:  Patient completed Sit <> Stand Transfer with contact guard assistance  with  straight cane   Patient completed Bed <> Chair Transfer using Stand Pivot technique with contact guard assistance with straight cane  Functional Mobility: Patient completed x20ft functional mobility with CGA and straight cane to increase dynamic standing balance and activity tolerance needed for ADL completion.   Pt unsteady.    Activities of Daily Living:  Feeding:  setup A. Drink from cup.  Lower Body Dressing: stand by assistance Doff/sadaf socks in chair.    Cognitive/Visual Perceptual:  Cognitive/Psychosocial Skills:     -       Oriented to: Person, Place, and Time   -       Follows Commands/attention:Follows two-step commands  -       Communication: clear/fluent  -       Safety awareness/insight to disability: impaired     Physical Exam:  Sensation:    -       Impaired  Pt reports neuropathy in B hands, feet, and LEs  Upper Extremity Range of Motion:     -       Right Upper Extremity: WFL  -       Left Upper Extremity: WFL  Upper Extremity Strength:    -       Right Upper Extremity: shoulder NT d/t pain, pt reports rotator cuff injury. Elbow 4/5  -       Left Upper Extremity: shoulder NT d/t pain, pt reports rotator cuff injury. Elbow 4/5   Strength:    -       Right Upper Extremity: WFL  -       Left Upper Extremity: WFL    AMPAC 6 Click ADL:  AMPAC Total Score: 20    Treatment & Education:  Patient educated on role of OT in acute setting  and benefits of participation. Educated on techniques to use to increase independence and decrease fall risk with functional transfers. Educated on importance of OOB activity and calling for A to transfer back to bed and meet needs. Encouraged completion of B UE AROM therex throughout the day to tolerance to increase functional strength and activity tolerance. Educated patient on importance of increased tolerance to upright position and direct impact on CV endurance and strength. Patient encouraged to sit up in chair for a minimum of 2 consecutive hours per day. Patient stated understanding and in agreement with POC.     Patient left up in chair with all lines intact, call button in reach, and chair alarm on    GOALS:   Multidisciplinary Problems       Occupational Therapy Goals          Problem: Occupational Therapy    Goal Priority Disciplines Outcome Interventions   Occupational Therapy Goal     OT, PT/OT     Description: Goals to be met by: 8/12/25     Patient will increase functional independence with ADLs by performing:    Grooming while standing at sink with Supervision.  Toileting from toilet with Supervision for hygiene and clothing management.   Toilet transfer to toilet with Supervision using SPC.  Upper extremity exercise program x12 reps per handout, with independence.                       DME Justifications:   Violet's mobility limitation cannot be sufficiently resolved by the use of a cane. Her functional mobility deficit can be sufficiently resolved with the use of a Rolling Walker. Patient's mobility limitation significantly impairs their ability to participate in one of more activities of daily living.  The use of a RW will significantly improve the patient's ability to participate in MRADLS and the patient will use it on regular basis in the home.    History:     Past Medical History:   Diagnosis Date    Age-related osteoporosis without current pathological fracture 8/20/2018    Anemia      Anxiety     Arthritis     Atrial flutter     Cancer     lymphoma Large cell B    CHF (congestive heart failure)     Chronic anemia 4/26/2017    Chronic midline low back pain with right-sided sciatica 8/20/2018    Coronary artery disease     01/2015 LHC patent LCX. 50% stenosis in LAD and RCA.      Depression     Disorder of kidney and ureter     Encounter for blood transfusion     GERD (gastroesophageal reflux disease)     Gout, arthritis     Heart failure     Hx of psychiatric care     Hyperlipidemia     Hypertension     Hypothyroidism     Immune deficiency disorder     Kidney disease     Lung nodule 2014    RML--stable    Obesity     Pacemaker     Metronic    Paroxysmal atrial fibrillation 3/15/2018    Pneumonia     Polyneuropathy     chemo induced    Psychiatric problem     Rheumatoid arthritis involving multiple sites with positive rheumatoid factor 4/19/2023    Stroke 11/16/2020    Tobacco dependence     quit 1976    Trouble in sleeping     Unstable angina 11/27/2020         Past Surgical History:   Procedure Laterality Date    APPENDECTOMY  1966 approx    CARDIAC PACEMAKER PLACEMENT  01/22/2015    CHOLECYSTECTOMY  1993    incidental at time of gastric bypass    COLON SURGERY Right 2017    hemicolectomy    COLONOSCOPY N/A 4/6/2017    Procedure: COLONOSCOPY;  Surgeon: Tye Enamorado MD;  Location: [facility];  Service: Endoscopy;  Laterality: N/A;    COLONOSCOPY N/A 11/28/2018    Procedure: COLONOSCOPY;  Surgeon: Saúl Arthur III, MD;  Location: [facility];  Service: Endoscopy;  Laterality: N/A;    CORONARY ANGIOPLASTY  02/2014    CORONARY STENT PLACEMENT  02/05/2014    ESOPHAGOGASTRODUODENOSCOPY N/A 11/28/2018    Procedure: EGD (ESOPHAGOGASTRODUODENOSCOPY);  Surgeon: Saúl Arthur III, MD;  Location: [facility];  Service: Endoscopy;  Laterality: N/A;    GASTRIC BYPASS  1993    with incidental choly    HERNIA REPAIR      IMPLANTATION OF BIVENTRICULAR PERMANENT PACEMAKER AS UPGRADE TO EXISTING PACEMAKER Left  7/13/2023    Procedure: Biventricular pacemaker upgrade/His lead vs CRT-P;  Surgeon: Velasquez Vizcaino MD;  Location: [facility] CATH LAB;  Service: Cardiology;  Laterality: Left;  Will need to upgrade her pacemaker.  Ideally his pacing lead would be the best approach. MDT/ Alternatively, an upgrade /His lead as Plan A  and  CRT if fails/notified MDT rep Mell and Arik  NOT MRI safe   Pacer and leads implanted    INJECTION OF ANESTHETIC AGENT INTO SACROILIAC JOINT Right 10/8/2020    Procedure: Right BLOCK, SACROILIAC JOINT with RN IV sedation;  Surgeon: Madi Anton MD;  Location: [facility];  Service: Pain Management;  Laterality: Right;    JOINT REPLACEMENT Bilateral 2009    3 months apart    TONSILLECTOMY  1959    VENOGRAM, CATH LAB Bilateral 6/29/2023    Procedure: Venogram, Cath Lab;  Surgeon: Velasquez Vizcaino MD;  Location: [facility] CATH LAB;  Service: Cardiology;  Laterality: Bilateral;  1:00PM arrival time  NOT MRI safe   Pacer and leads implanted by [employer]ian 1/22/15   MDTR SEDR01 Hodan, [id number]   A lead: SAMEER 5076 CapSure Fix Novus, [id number]   RV lead: SAMEER 5076 CapSure Fix Novus, [id number]       Time Tracking:     OT Date of Treatment: 07/29/25  OT Start Time: 0955  OT Stop Time: 1020  OT Total Time (min): 25 min    Billable Minutes:Evaluation 15  Therapeutic Activity 10    7/29/2025  Ludmila Elder, OT

## 2025-07-29 NOTE — ASSESSMENT & PLAN NOTE
Patient's blood pressure range in the last 24 hours was: BP  Min: 112/60  Max: 182/84.The patient's inpatient anti-hypertensive regimen is listed below:  Current Antihypertensives  metoprolol tartrate (LOPRESSOR) tablet 25 mg, 2 times daily, Oral    Plan  - BP is controlled, no changes needed to their regimen  - Hold Olmesartan and Furosemide due to NATHALIE

## 2025-07-29 NOTE — ASSESSMENT & PLAN NOTE
NATHALIE is likely due to pre-renal azotemia due to intravascular volume depletion. Baseline creatinine is around 1.9. Most recent creatinine and eGFR are listed below.  Recent Labs     07/28/25  1012 07/29/25  0147 07/29/25  0218   CREATININE 3.5* 3.0* 3.0*   EGFRNORACEVR 12* 15* 15*      Plan  - NATHALIE is improving  - Avoid nephrotoxins and renally dose meds for GFR listed above  - Monitor urine output, serial BMP, and adjust therapy as needed  - Renal US showed no significant hydronephrosis. 1.3 cm right upper pole renal cyst.  - IVF   - Improving

## 2025-07-29 NOTE — SUBJECTIVE & OBJECTIVE
Interval History: Follow up for NATHALIE.  Patient's renal function is improving.  She had a fall this morning with no deficits.  She states that she does fall at home and normally uses a Rolator and cane to ambulate.  She was doing neither at the time of the fall.    Review of Systems  Objective:     Vital Signs (Most Recent):  Temp: 97.5 °F (36.4 °C) (07/29/25 1229)  Pulse: 60 (07/29/25 1229)  Resp: 18 (07/29/25 1229)  BP: (!) 160/79 (07/29/25 1229)  SpO2: 99 % (07/29/25 1229) Vital Signs (24h Range):  Temp:  [97.4 °F (36.3 °C)-97.6 °F (36.4 °C)] 97.5 °F (36.4 °C)  Pulse:  [58-68] 60  Resp:  [16-20] 18  SpO2:  [95 %-100 %] 99 %  BP: (112-182)/(58-92) 160/79     Weight: 58.1 kg (128 lb 1.4 oz)  Body mass index is 21.99 kg/m².    Intake/Output Summary (Last 24 hours) at 7/29/2025 1532  Last data filed at 7/29/2025 1015  Gross per 24 hour   Intake 791.51 ml   Output 500 ml   Net 291.51 ml         Physical Exam  Vitals and nursing note reviewed.   Constitutional:       Appearance: Normal appearance.   HENT:      Head: Normocephalic and atraumatic.      Nose: Nose normal.      Mouth/Throat:      Mouth: Mucous membranes are moist.   Eyes:      Extraocular Movements: Extraocular movements intact.      Conjunctiva/sclera: Conjunctivae normal.   Cardiovascular:      Rate and Rhythm: Normal rate and regular rhythm.      Pulses: Normal pulses.      Heart sounds: Normal heart sounds.   Pulmonary:      Effort: Pulmonary effort is normal.      Breath sounds: Normal breath sounds.   Abdominal:      General: Abdomen is flat. Bowel sounds are normal.      Palpations: Abdomen is soft.   Musculoskeletal:         General: Normal range of motion.      Cervical back: Normal range of motion and neck supple.   Skin:     General: Skin is warm.      Capillary Refill: Capillary refill takes less than 2 seconds.   Neurological:      Mental Status: She is alert and oriented to person, place, and time. Mental status is at baseline.   Psychiatric:          Mood and Affect: Mood normal.         Behavior: Behavior normal.               Significant Labs: All pertinent labs within the past 24 hours have been reviewed.  Recent Lab Results         07/29/25  0218   07/29/25  0147        Albumin 3.3   3.3       ALP 36   38       ALT 24   25       Anion Gap 14   14       AST 39   40       Baso # 0.02         Basophil % 0.3         BILIRUBIN TOTAL 0.3  Comment: For infants and newborns, interpretation of results should be based   on gestational age, weight and in agreement with clinical   observations.    Premature Infant recommended reference ranges:   0-24 hours:  <8.0 mg/dL   24-48 hours: <12.0 mg/dL   3-5 days:    <15.0 mg/dL   6-29 days:   <15.0 mg/dL   0.3  Comment: For infants and newborns, interpretation of results should be based   on gestational age, weight and in agreement with clinical   observations.    Premature Infant recommended reference ranges:   0-24 hours:  <8.0 mg/dL   24-48 hours: <12.0 mg/dL   3-5 days:    <15.0 mg/dL   6-29 days:   <15.0 mg/dL       BUN 74   74       Calcium 7.2   7.1       Chloride 119   119       CO2 11   10       Creatinine 3.0   3.0       eGFR 15  Comment: Estimated GFR calculated using the CKD-EPI creatinine (2021) equation.   15  Comment: Estimated GFR calculated using the CKD-EPI creatinine (2021) equation.       Eos # 0.13         Eos % 1.8         Glucose 92   91       Gran # (ANC) 4.44         Hematocrit 30.4         Hemoglobin 10.1         Immature Grans (Abs) 0.03  Comment: Mild elevation in immature granulocytes is non specific and can be seen in a variety of conditions including stress response, acute inflammation, trauma and pregnancy. Correlation with other laboratory and clinical findings is essential.         Immature Granulocytes 0.4         Lymph # 1.92         Lymph % 27.3         Magnesium    1.8       MCH 34.5         MCHC 33.2                  Mono # 0.50         Mono % 7.1         MPV 10.3          Neut % 63.1         nRBC 0         Phosphorus Level   8.0       Platelet Count 133         Potassium 3.6   3.3       PROTEIN TOTAL 5.9   5.8       RBC 2.93         RDW 16.6         Sodium 144   143       WBC 7.04                 Significant Imaging: I have reviewed all pertinent imaging results/findings within the past 24 hours.

## 2025-07-29 NOTE — NURSING
I assisted patient to the bathroom and once seated on commode I instructed her to pull laboratory string once done, I remained in the room, not in the bathroom.  Pt agreed to pull string, after a few brief minutes, I then heard a loud thud. I went into bathroom and patient had fallen backwards into the tub and bumped her head. I assisted the pt out of the tub with caution, to which she then began to explain that she didn't pull the laboratory string like requested because she thought she would be able to do it on her own, as she does it by herself at home. Pt was assessed, able to answer orientation questions accurately, placed back in bed with bed alarm set and AVASYS in room vitals taken and documented, provider and charge nurse notified. No evidence of bleeding once assessed. Pt refused Nurse to notify family and new orders noted.

## 2025-07-29 NOTE — PT/OT/SLP EVAL
Physical Therapy Evaluation and Treatment    Patient Name: Violet Swenson   MRN: [id number]  Recent Surgery: * No surgery found *      Recommendations:     Discharge Recommendations: Low Intensity Therapy   Discharge Equipment Recommendations: none   Barriers to discharge: Decreased caregiver support    Assessment:     Violet Swenson is a 85 y.o. female admitted with a medical diagnosis of Acute on chronic kidney failure. She presents with the following impairments/functional limitations: weakness, impaired endurance, impaired functional mobility, gait instability, impaired balance, pain, decreased safety awareness, decreased lower extremity function, decreased ROM, impaired cardiopulmonary response to activity.    Rehab Prognosis: Good; patient would benefit from acute PT services to address these deficits and reach maximum level of function.    Plan:     During this hospitalization, patient to be seen 3 x/week to address the above listed problems via gait training, therapeutic activities, therapeutic exercises    Plan of Care Expires: 08/12/25    Subjective     Chief Complaint: Pt is motivated to participate  Patient Comments/Goals: none stated  Pain/Comfort:  Pain Rating 1: 0/10    Social History:  Living Environment: Patient lives alone in a mobile home with number of outside stair(s): 5 with B rail. Reports her son lives close by, however he is a cancer pt and she is usually assisting him.   Prior Level of Function: Prior to admission, patient was modified independent, driving and not working, and ambulated household distances using straight cane, community distances using rollator  Equipment Used at Home: rollator, cane, straight  DME owned (not currently used): none  Assistance Upon Discharge: family (limited)    Objective:     Communicated with epic chart review prior to session. Patient found HOB elevated with peripheral IV, telemetry (KASH SYS) upon PT entry to room.    General  "Precautions: Standard, fall   Orthopedic Precautions: N/A   Braces: N/A    Respiratory Status: Room air    Exams:  Cognition: Patient is oriented to Person, Place, Time, Situation  RLE ROM: WFL  RLE Strength: Grossly 4-/5  LLE ROM: WFL  LLE Strength: Grossly 4/5  Sensation:    -       Impaired  reports neuropathy in B hands and LE  Skin Integrity/Edema:     -       Skin integrity: Visible skin intact    Functional Mobility:  Gait belt applied - Yes  Bed Mobility  Rolling Right: stand by assistance  Scooting: stand by assistance  Supine to Sit: stand by assistance  Transfers  Sit to Stand: contact guard assistance with straight cane  Bed to Chair: contact guard assistance with straight cane using Step Transfer  Gait  Patient ambulated 20ft with straight cane and contact guard assistance. Patient demonstrates occasional unsteady gait. No c/o dizziness or SOB, no gross LOB. All lines remained intact throughout ambulation trail.  Pt did not want to ambulate in the [place] this date.   Balance  Sitting: stand by assistance  Standing: contact guard assistance    Therapeutic Activities and Exercises:   Pt educated on role of PT in acute care and POC. Educated on importance of OOB activities, activity pacing, and HEP (marching/hip flex, hip abd, heel slides/LAQ, quad sets, ankle pumps) in order to maintain/regain strength. Encouraged to sit up in chair for all meals. Educated on proper use of SPC for safety and to reduce risk of falling. Educated on "call don't fall" policy and increased risk of falling due to weakness, instructed to utilize call bell for assistance with all transfers. Pt agreeable to all requests.    AM-PAC 6 CLICK MOBILITY  Total Score:16    Patient left up in chair with all lines intact, call button in reach, and chair alarm on.    GOALS:   Multidisciplinary Problems       Physical Therapy Goals          Problem: Physical Therapy    Goal Priority Disciplines Outcome Interventions   Physical Therapy Goal     " PT, PT/OT     Description: Goals to be met by 8/12/25.  1. Pt will complete bed mobility MOD I.  2. Pt will complete sit to stand MOD I.  3. Pt will ambulate 200ft MOD I using SPC.  4. Pt will increase AMPAC score by 2 points to progress functional mobility.                       DME Justifications:  No DME recommended requiring DME justifications    History:     Past Medical History:   Diagnosis Date    Age-related osteoporosis without current pathological fracture 8/20/2018    Anemia     Anxiety     Arthritis     Atrial flutter     Cancer     lymphoma Large cell B    CHF (congestive heart failure)     Chronic anemia 4/26/2017    Chronic midline low back pain with right-sided sciatica 8/20/2018    Coronary artery disease     01/2015 LHC patent LCX. 50% stenosis in LAD and RCA.      Depression     Disorder of kidney and ureter     Encounter for blood transfusion     GERD (gastroesophageal reflux disease)     Gout, arthritis     Heart failure     Hx of psychiatric care     Hyperlipidemia     Hypertension     Hypothyroidism     Immune deficiency disorder     Kidney disease     Lung nodule 2014    RML--stable    Obesity     Pacemaker     Metronic    Paroxysmal atrial fibrillation 3/15/2018    Pneumonia     Polyneuropathy     chemo induced    Psychiatric problem     Rheumatoid arthritis involving multiple sites with positive rheumatoid factor 4/19/2023    Stroke 11/16/2020    Tobacco dependence     quit 1976    Trouble in sleeping     Unstable angina 11/27/2020       Past Surgical History:   Procedure Laterality Date    APPENDECTOMY  1966 approx    CARDIAC PACEMAKER PLACEMENT  01/22/2015    CHOLECYSTECTOMY  1993    incidental at time of gastric bypass    COLON SURGERY Right 2017    hemicolectomy    COLONOSCOPY N/A 4/6/2017    Procedure: COLONOSCOPY;  Surgeon: Tye Enamorado MD;  Location: [facility];  Service: Endoscopy;  Laterality: N/A;    COLONOSCOPY N/A 11/28/2018    Procedure: COLONOSCOPY;  Surgeon: Saúl Arthur  MD LUTHER;  Location: [facility] ENDO;  Service: Endoscopy;  Laterality: N/A;    CORONARY ANGIOPLASTY  02/2014    CORONARY STENT PLACEMENT  02/05/2014    ESOPHAGOGASTRODUODENOSCOPY N/A 11/28/2018    Procedure: EGD (ESOPHAGOGASTRODUODENOSCOPY);  Surgeon: Saúl Arthur III, MD;  Location: [facility] ENDO;  Service: Endoscopy;  Laterality: N/A;    GASTRIC BYPASS  1993    with incidental choly    HERNIA REPAIR      IMPLANTATION OF BIVENTRICULAR PERMANENT PACEMAKER AS UPGRADE TO EXISTING PACEMAKER Left 7/13/2023    Procedure: Biventricular pacemaker upgrade/His lead vs CRT-P;  Surgeon: Velasquez Vizcaino MD;  Location: [facility] CATH LAB;  Service: Cardiology;  Laterality: Left;  Will need to upgrade her pacemaker.  Ideally his pacing lead would be the best approach. MDT/ Alternatively, an upgrade /His lead as Plan A  and  CRT if fails/notified MDT rep Mell and Arik  NOT MRI safe   Pacer and leads implanted    INJECTION OF ANESTHETIC AGENT INTO SACROILIAC JOINT Right 10/8/2020    Procedure: Right BLOCK, SACROILIAC JOINT with RN IV sedation;  Surgeon: Madi Anton MD;  Location: [facility];  Service: Pain Management;  Laterality: Right;    JOINT REPLACEMENT Bilateral 2009    3 months apart    TONSILLECTOMY  1959    VENOGRAM, CATH LAB Bilateral 6/29/2023    Procedure: Venogram, Cath Lab;  Surgeon: Velasquez Vizcaino MD;  Location: [facility] CATH LAB;  Service: Cardiology;  Laterality: Bilateral;  1:00PM arrival time  NOT MRI safe   Pacer and leads implanted by [employer] 1/22/15   MDTR SEDR01 Hodan, [id number]   A lead: SAMEER 5076 CapSure Fix Novus, [id number]   RV lead: SAMEER 5076 CapSure Fix Novus, [id number]       Time Tracking:     PT Received On: 07/29/25  PT Start Time: 0958  PT Stop Time: 1021  PT Total Time (min): 23 min     Billable Minutes: Evaluation 15min and Therapeutic Activity 8min    7/29/2025

## 2025-07-30 LAB
ANION GAP (OHS): 10 MMOL/L (ref 8–16)
BUN SERPL-MCNC: 59 MG/DL (ref 8–23)
CALCIUM SERPL-MCNC: 7 MG/DL (ref 8.7–10.5)
CHLORIDE SERPL-SCNC: 122 MMOL/L (ref 95–110)
CO2 SERPL-SCNC: 12 MMOL/L (ref 23–29)
CREAT SERPL-MCNC: 2.6 MG/DL (ref 0.5–1.4)
GFR SERPLBLD CREATININE-BSD FMLA CKD-EPI: 18 ML/MIN/1.73/M2
GLUCOSE SERPL-MCNC: 87 MG/DL (ref 70–110)
POTASSIUM SERPL-SCNC: 3.2 MMOL/L (ref 3.5–5.1)
SODIUM SERPL-SCNC: 144 MMOL/L (ref 136–145)

## 2025-07-30 PROCEDURE — 25000003 PHARM REV CODE 250: Mod: HCNC | Performed by: NURSE PRACTITIONER

## 2025-07-30 PROCEDURE — S5010 5% DEXTROSE AND 0.45% SALINE: HCPCS | Mod: HCNC | Performed by: FAMILY MEDICINE

## 2025-07-30 PROCEDURE — 63600175 PHARM REV CODE 636 W HCPCS: Mod: HCNC | Performed by: NURSE PRACTITIONER

## 2025-07-30 PROCEDURE — 36415 COLL VENOUS BLD VENIPUNCTURE: CPT | Mod: HCNC | Performed by: FAMILY MEDICINE

## 2025-07-30 PROCEDURE — 97110 THERAPEUTIC EXERCISES: CPT | Mod: HCNC

## 2025-07-30 PROCEDURE — 25000003 PHARM REV CODE 250: Mod: HCNC | Performed by: FAMILY MEDICINE

## 2025-07-30 PROCEDURE — 97116 GAIT TRAINING THERAPY: CPT | Mod: HCNC

## 2025-07-30 PROCEDURE — 80048 BASIC METABOLIC PNL TOTAL CA: CPT | Mod: HCNC | Performed by: FAMILY MEDICINE

## 2025-07-30 PROCEDURE — 63600175 PHARM REV CODE 636 W HCPCS: Mod: HCNC | Performed by: FAMILY MEDICINE

## 2025-07-30 PROCEDURE — 21400001 HC TELEMETRY ROOM: Mod: HCNC

## 2025-07-30 RX ORDER — POTASSIUM CHLORIDE 20 MEQ/1
40 TABLET, EXTENDED RELEASE ORAL 2 TIMES DAILY
Status: COMPLETED | OUTPATIENT
Start: 2025-07-30 | End: 2025-07-31

## 2025-07-30 RX ORDER — HYDRALAZINE HYDROCHLORIDE 20 MG/ML
10 INJECTION INTRAMUSCULAR; INTRAVENOUS EVERY 6 HOURS PRN
Status: DISCONTINUED | OUTPATIENT
Start: 2025-07-30 | End: 2025-08-01 | Stop reason: HOSPADM

## 2025-07-30 RX ORDER — DEXTROSE MONOHYDRATE AND SODIUM CHLORIDE 5; .45 G/100ML; G/100ML
INJECTION, SOLUTION INTRAVENOUS CONTINUOUS
Status: DISCONTINUED | OUTPATIENT
Start: 2025-07-30 | End: 2025-08-01 | Stop reason: HOSPADM

## 2025-07-30 RX ORDER — PROCHLORPERAZINE EDISYLATE 5 MG/ML
5 INJECTION INTRAMUSCULAR; INTRAVENOUS EVERY 6 HOURS PRN
Status: DISCONTINUED | OUTPATIENT
Start: 2025-07-30 | End: 2025-08-01 | Stop reason: HOSPADM

## 2025-07-30 RX ADMIN — SERTRALINE HYDROCHLORIDE 150 MG: 50 TABLET ORAL at 08:07

## 2025-07-30 RX ADMIN — METOPROLOL TARTRATE 25 MG: 25 TABLET, FILM COATED ORAL at 09:07

## 2025-07-30 RX ADMIN — HYDRALAZINE HYDROCHLORIDE 10 MG: 20 INJECTION INTRAMUSCULAR; INTRAVENOUS at 03:07

## 2025-07-30 RX ADMIN — APIXABAN 2.5 MG: 2.5 TABLET, FILM COATED ORAL at 09:07

## 2025-07-30 RX ADMIN — SODIUM BICARBONATE 650 MG: 650 TABLET ORAL at 09:07

## 2025-07-30 RX ADMIN — ONDANSETRON 4 MG: 2 INJECTION INTRAMUSCULAR; INTRAVENOUS at 02:07

## 2025-07-30 RX ADMIN — TRAMADOL HYDROCHLORIDE 50 MG: 50 TABLET, COATED ORAL at 01:07

## 2025-07-30 RX ADMIN — POTASSIUM CHLORIDE 40 MEQ: 1500 TABLET, EXTENDED RELEASE ORAL at 08:07

## 2025-07-30 RX ADMIN — POTASSIUM CHLORIDE 40 MEQ: 1500 TABLET, EXTENDED RELEASE ORAL at 09:07

## 2025-07-30 RX ADMIN — CLOPIDOGREL 75 MG: 75 TABLET ORAL at 08:07

## 2025-07-30 RX ADMIN — CALCITRIOL CAPSULES 0.25 MCG 0.25 MCG: 0.25 CAPSULE ORAL at 08:07

## 2025-07-30 RX ADMIN — METOPROLOL TARTRATE 25 MG: 25 TABLET, FILM COATED ORAL at 08:07

## 2025-07-30 RX ADMIN — LEVOTHYROXINE SODIUM 112 MCG: 0.11 TABLET ORAL at 05:07

## 2025-07-30 RX ADMIN — PROCHLORPERAZINE EDISYLATE 5 MG: 5 INJECTION INTRAMUSCULAR; INTRAVENOUS at 04:07

## 2025-07-30 RX ADMIN — DEXTROSE AND SODIUM CHLORIDE: 5; 450 INJECTION, SOLUTION INTRAVENOUS at 09:07

## 2025-07-30 RX ADMIN — APIXABAN 2.5 MG: 2.5 TABLET, FILM COATED ORAL at 08:07

## 2025-07-30 RX ADMIN — CEFTRIAXONE 1 G: 1 INJECTION, POWDER, FOR SOLUTION INTRAMUSCULAR; INTRAVENOUS at 08:07

## 2025-07-30 RX ADMIN — TRAMADOL HYDROCHLORIDE 50 MG: 50 TABLET, COATED ORAL at 09:07

## 2025-07-30 RX ADMIN — PRAVASTATIN SODIUM 40 MG: 20 TABLET ORAL at 08:07

## 2025-07-30 RX ADMIN — SODIUM BICARBONATE 650 MG: 650 TABLET ORAL at 08:07

## 2025-07-30 RX ADMIN — DEXTROSE AND SODIUM CHLORIDE: 5; 450 INJECTION, SOLUTION INTRAVENOUS at 08:07

## 2025-07-30 NOTE — PLAN OF CARE
Problem: Hospitalized Older Adult  Goal: Optimal Cognitive Function  Outcome: Ongoing  Goal: Effective Bowel Elimination  Outcome: Ongoing  Goal: Optimal Coping  Outcome: Ongoing  Goal: Fluid and Electrolyte Balance  Outcome: Ongoing  Goal: Optimal Functional Ability  Outcome: Ongoing  Goal: Improved Oral Intake  Outcome: Ongoing  Goal: Adequate Sleep/Rest  Outcome: Ongoing  Goal: Effective Urinary Elimination  Outcome: Ongoing     Problem: Adult Inpatient Plan of Care  Goal: Plan of Care Review  Outcome: Ongoing  Goal: Patient-Specific Goal (Individualized)  Outcome: Ongoing  Goal: Absence of Hospital-Acquired Illness or Injury  Outcome: Ongoing  Goal: Optimal Comfort and Wellbeing  Outcome: Ongoing  Goal: Readiness for Transition of Care  Outcome: Ongoing     Problem: Acute Kidney Injury/Impairment  Goal: Fluid and Electrolyte Balance  Outcome: Ongoing  Goal: Improved Oral Intake  Outcome: Ongoing  Goal: Effective Renal Function  Outcome: Ongoing     Problem: Fall Injury Risk  Goal: Absence of Fall and Fall-Related Injury  Outcome: Ongoing

## 2025-07-30 NOTE — SUBJECTIVE & OBJECTIVE
Interval History: Follow up for NATHALIE, hyperchloremia, and fall.  Patient's NATHALIE is improving but due to hyperchloremia fluids were changed to D5 1/2NS.  Will repeat BMP in AM with plans for DC tomorrow.  NO new complaints.    Review of Systems  Objective:     Vital Signs (Most Recent):  Temp: 97.9 °F (36.6 °C) (07/30/25 1306)  Pulse: (!) 59 (07/30/25 1331)  Resp: 18 (07/30/25 1306)  BP: (!) 168/84 (07/30/25 1306)  SpO2: 100 % (07/30/25 1306) Vital Signs (24h Range):  Temp:  [97.2 °F (36.2 °C)-99 °F (37.2 °C)] 97.9 °F (36.6 °C)  Pulse:  [58-64] 59  Resp:  [18] 18  SpO2:  [90 %-100 %] 100 %  BP: (121-168)/(7-84) 168/84     Weight: 59.8 kg (131 lb 13.4 oz)  Body mass index is 22.63 kg/m².    Intake/Output Summary (Last 24 hours) at 7/30/2025 1446  Last data filed at 7/30/2025 0951  Gross per 24 hour   Intake 2187.1 ml   Output --   Net 2187.1 ml         Physical Exam  Vitals and nursing note reviewed.   Constitutional:       Appearance: Normal appearance.   HENT:      Head: Normocephalic and atraumatic.      Nose: Nose normal.      Mouth/Throat:      Mouth: Mucous membranes are moist.   Eyes:      Extraocular Movements: Extraocular movements intact.      Conjunctiva/sclera: Conjunctivae normal.   Cardiovascular:      Rate and Rhythm: Normal rate and regular rhythm.      Pulses: Normal pulses.      Heart sounds: Normal heart sounds.   Pulmonary:      Effort: Pulmonary effort is normal.      Breath sounds: Normal breath sounds.   Abdominal:      General: Abdomen is flat. Bowel sounds are normal.      Palpations: Abdomen is soft.   Musculoskeletal:         General: Normal range of motion.      Cervical back: Normal range of motion and neck supple.   Skin:     General: Skin is warm.      Capillary Refill: Capillary refill takes less than 2 seconds.   Neurological:      Mental Status: She is alert and oriented to person, place, and time. Mental status is at baseline.   Psychiatric:         Mood and Affect: Mood normal.          Behavior: Behavior normal.               Significant Labs: All pertinent labs within the past 24 hours have been reviewed.  Recent Lab Results         07/30/25  0629        Anion Gap 10       BUN 59       Calcium 7.0       Chloride 122       CO2 12       Creatinine 2.6       eGFR 18  Comment: Estimated GFR calculated using the CKD-EPI creatinine (2021) equation.       Glucose 87       Potassium 3.2       Sodium 144               Significant Imaging: I have reviewed all pertinent imaging results/findings within the past 24 hours.

## 2025-07-30 NOTE — PT/OT/SLP PROGRESS
Physical Therapy Treatment    Patient Name:  Violet Swenson   MRN:  [id number]    Recommendations:     Discharge Recommendations: Low Intensity Therapy  Discharge Equipment Recommendations: walker, rolling  Barriers to discharge: None    Assessment:     Violet Swenson is a 85 y.o. female admitted with a medical diagnosis of Acute on chronic kidney failure.  She presents with the following impairments/functional limitations: weakness, impaired endurance, impaired functional mobility, gait instability, impaired balance, decreased safety awareness, decreased lower extremity function, decreased ROM, impaired cardiopulmonary response to activity.    Rehab Prognosis: Good; patient would benefit from acute skilled PT services to address these deficits and reach maximum level of function.    Recent Surgery: * No surgery found *      Plan:     During this hospitalization, patient to be seen 3 x/week to address the identified rehab impairments via gait training, therapeutic activities, therapeutic exercises and progress toward the following goals:    Plan of Care Expires:  08/12/25    Subjective     Chief Complaint: Pt is motivated to participate  Patient/Family Comments/goals: none stated  Pain/Comfort:  Pain Rating 1: 0/10      Objective:     Communicated with epic chart review prior to session.  Patient found HOB elevated with peripheral IV, telemetry, bed alarm (KASH SYS) upon PT entry to room.     General Precautions: Standard, fall  Orthopedic Precautions: N/A  Braces: N/A  Respiratory Status: Room air     Functional Mobility:  Gait Belt Applied - Yes   Socks/Shoes Donned - Yes  Bed Mobility  Rolling Right: stand by assistance  Scooting: stand by assistance  Supine to Sit: stand by assistance  Transfers  Sit to Stand: contact guard assistance with straight cane  Bed to Chair: contact guard assistance with straight cane using Step Transfer  Gait  Patient ambulated 40ft with straight cane and using  "rail when available and contact guard assistance. Patient demonstrates occasional unsteady gait, slowed pace. No c/o dizziness or SOB. Pt would benefit from use of RW. All lines remained intact throughout ambulation trial.  Balance  Sitting: stand by assistance  Standing: contact guard assistance    Therapeutic Exercise  Patient performed 1 set(s) of 10 repetitions of the following seated exercises: ankle pumps, long arc quads, and marches for bilateral LE.   Patient performed 1 set(s) of 10 repetitions of the following seated exercises: shoulder flexion (Eduacted on AAROM for R shoulder due to difficulty), bicep curl, hand squeeze  for bilateral UE.   Patient required skilled PT for instruction of exercises and appropriate cues to perform exercises safely and appropriately.  Print out given for all exercises.       AM-PAC 6 CLICK MOBILITY  Turning over in bed (including adjusting bedclothes, sheets and blankets)?: 3  Sitting down on and standing up from a chair with arms (e.g., wheelchair, bedside commode, etc.): 3  Moving from lying on back to sitting on the side of the bed?: 3  Moving to and from a bed to a chair (including a wheelchair)?: 3  Need to walk in hospital room?: 3  Climbing 3-5 steps with a railing?: 1 (NT)  Basic Mobility Total Score: 16       Treatment & Education:  Reviewed role of PT in acute care and POC. Pt tolerated interventions well. Reviewed importance of OOB activities, activity pacing, and HEP (marching/hip flex, hip abd, heel slides/LAQ, quad sets, ankle pumps) in order to maintain/regain strength. Encouraged to sit up in chair for all meals. Educated on use of RW for safety and to reduce risk of falling. Reviewed "call don't fall" policy and increased risk of falling due to weakness, instructed to utilize call bell for assistance with all transfers. Pt agreeable to all requests.    Patient left up in chair with all lines intact, call button in reach, and chair alarm on..    GOALS: "   Multidisciplinary Problems       Physical Therapy Goals          Problem: Physical Therapy    Goal Priority Disciplines Outcome Interventions   Physical Therapy Goal     PT, PT/OT Progressing    Description: Goals to be met by 8/12/25.  1. Pt will complete bed mobility MOD I.  2. Pt will complete sit to stand MOD I.  3. Pt will ambulate 200ft MOD I using SPC.  4. Pt will increase AMPAC score by 2 points to progress functional mobility.                       DME Justifications:   Violet's mobility limitation cannot be sufficiently resolved by the use of a cane. Her functional mobility deficit can be sufficiently resolved with the use of a Rolling Walker. Patient's mobility limitation significantly impairs their ability to participate in one of more activities of daily living.  The use of a RW will significantly improve the patient's ability to participate in MRADLS and the patient will use it on regular basis in the home.    Time Tracking:     PT Received On: 07/30/25  PT Start Time: 0910     PT Stop Time: 0935  PT Total Time (min): 25 min     Billable Minutes: Gait Training 10min and Therapeutic Exercise 15min    Treatment Type: Treatment  PT/PTA: PT     Number of PTA visits since last PT visit: 0     07/30/2025

## 2025-07-30 NOTE — PLAN OF CARE
Problem: Hospitalized Older Adult  Goal: Optimal Cognitive Function  Outcome: Progressing  Goal: Effective Bowel Elimination  Outcome: Progressing  Goal: Optimal Coping  Outcome: Progressing  Goal: Fluid and Electrolyte Balance  Outcome: Progressing  Goal: Optimal Functional Ability  Outcome: Progressing  Goal: Improved Oral Intake  Outcome: Progressing  Goal: Adequate Sleep/Rest  Outcome: Progressing  Goal: Effective Urinary Elimination  Outcome: Progressing     Problem: Adult Inpatient Plan of Care  Goal: Plan of Care Review  Outcome: Progressing  Goal: Patient-Specific Goal (Individualized)  Outcome: Progressing  Goal: Absence of Hospital-Acquired Illness or Injury  Outcome: Progressing  Goal: Optimal Comfort and Wellbeing  Outcome: Progressing  Goal: Readiness for Transition of Care  Outcome: Progressing     Problem: Acute Kidney Injury/Impairment  Goal: Fluid and Electrolyte Balance  Outcome: Progressing  Goal: Improved Oral Intake  Outcome: Progressing  Goal: Effective Renal Function  Outcome: Progressing     Problem: Fall Injury Risk  Goal: Absence of Fall and Fall-Related Injury  Outcome: Progressing

## 2025-07-30 NOTE — ASSESSMENT & PLAN NOTE
Patient has issues with falls at home.  PT/OT recommended home with HH.  Consult for CM placed.  Rolling walker ordered per patient request

## 2025-07-30 NOTE — PLAN OF CARE
Problem: Hospitalized Older Adult  Goal: Optimal Cognitive Function  Outcome: Progressing  Goal: Effective Bowel Elimination  Outcome: Progressing  Goal: Optimal Coping  Outcome: Progressing  Goal: Fluid and Electrolyte Balance  Outcome: Progressing  Goal: Optimal Functional Ability  Outcome: Progressing  Goal: Improved Oral Intake  Outcome: Progressing  Goal: Adequate Sleep/Rest  Outcome: Progressing  Goal: Effective Urinary Elimination  Outcome: Progressing     Problem: Adult Inpatient Plan of Care  Goal: Plan of Care Review  Outcome: Progressing  Goal: Patient-Specific Goal (Individualized)  Outcome: Progressing  Goal: Absence of Hospital-Acquired Illness or Injury  Outcome: Progressing  Goal: Optimal Comfort and Wellbeing  Outcome: Progressing  Goal: Readiness for Transition of Care  Outcome: Progressing     Problem: Acute Kidney Injury/Impairment  Goal: Fluid and Electrolyte Balance  Outcome: Progressing  Goal: Improved Oral Intake  Outcome: Progressing  Goal: Effective Renal Function  Outcome: ProgressingPOC reviewed with pt. Pt verbalizes understanding of POC. No questions at this time.  AAOx4. NADN.  Paced rhythm on cardiac monitor.  Pt remains free of falls.  No complaints at this time.  Safety measures in place. AVASYS in room  Informed pt to call for assistance before getting up. Pt verbalizes understanding.  Hourly rounding and chart check complete.

## 2025-07-30 NOTE — PLAN OF CARE
PT EVAL complete. Required SBA for bed mobility, ambulated 40ft CGA using SPC. Recommending low intensity therapy and RW.

## 2025-07-30 NOTE — ASSESSMENT & PLAN NOTE
NATHALIE is likely due to pre-renal azotemia due to intravascular volume depletion. Baseline creatinine is around 1.9. Most recent creatinine and eGFR are listed below.  Recent Labs     07/29/25  0147 07/29/25  0218 07/30/25  0629   CREATININE 3.0* 3.0* 2.6*   EGFRNORACEVR 15* 15* 18*      Plan  - NATHALIE is improving  - Avoid nephrotoxins and renally dose meds for GFR listed above  - Monitor urine output, serial BMP, and adjust therapy as needed  - Renal US showed no significant hydronephrosis. 1.3 cm right upper pole renal cyst.  - IVF - change to D5 1/2 NS  - Improving

## 2025-07-30 NOTE — ASSESSMENT & PLAN NOTE
-UA with 3+ leukos  -No urine cx reflexed  -Continue Ceftriaxone 1 gm IV daily (D3/3) - complete

## 2025-07-30 NOTE — ASSESSMENT & PLAN NOTE
Patient has permanent atrial fibrillation. Patient is currently in atrial fibrillation. [id number] Score: 4. The patients heart rate in the last 24 hours is as follows:  Pulse  Min: 58  Max: 64     Antiarrhythmics  metoprolol tartrate (LOPRESSOR) tablet 25 mg, 2 times daily, Oral    Anticoagulants  apixaban tablet 2.5 mg, 2 times daily, Oral    Plan  - Replete lytes with a goal of K>4, Mg >2  - Patient is anticoagulated, see medications listed above.  - Patient's afib is currently controlled  - Continue Eliquis and Metoprolol

## 2025-07-30 NOTE — PLAN OF CARE
A243/A243 KEELEYLino Swenson is a 85 y.o.female admitted on 7/28/2025 for Acute on chronic kidney failure   Code Status: Full Code MRN: [id number]   Review of patient's allergies indicates:   Allergen Reactions    Corticosteroids (glucocorticoids) Nausea Only and Other (See Comments)     Stomach pain, dizziness, headache    Oxycodone Other (See Comments)     Blood pressure dropped     Past Medical History:   Diagnosis Date    Age-related osteoporosis without current pathological fracture 8/20/2018    Anemia     Anxiety     Arthritis     Atrial flutter     Cancer     lymphoma Large cell B    CHF (congestive heart failure)     Chronic anemia 4/26/2017    Chronic midline low back pain with right-sided sciatica 8/20/2018    Coronary artery disease     01/2015 LHC patent LCX. 50% stenosis in LAD and RCA.      Depression     Disorder of kidney and ureter     Encounter for blood transfusion     GERD (gastroesophageal reflux disease)     Gout, arthritis     Heart failure     Hx of psychiatric care     Hyperlipidemia     Hypertension     Hypothyroidism     Immune deficiency disorder     Kidney disease     Lung nodule 2014    RML--stable    Obesity     Pacemaker     Metronic    Paroxysmal atrial fibrillation 3/15/2018    Pneumonia     Polyneuropathy     chemo induced    Psychiatric problem     Rheumatoid arthritis involving multiple sites with positive rheumatoid factor 4/19/2023    Stroke 11/16/2020    Tobacco dependence     quit 1976    Trouble in sleeping     Unstable angina 11/27/2020      PRN meds    acetaminophen, 650 mg, Q8H PRN  ondansetron, 4 mg, Q8H PRN  sodium chloride 0.9%, 10 mL, PRN  traMADoL, 50 mg, Q12H PRN      Chart check completed. Will continue plan of care.         [place] Coma Scale Score: 15     Lead Monitored: Lead II Rhythm: paced rhythm Frequency/Ectopy: PVCs  Cardiac/Telemetry Box Number: [id number]  VTE Core Measure: (SCDs) Sequential compression device initiated/maintained Last Bowel Movement:  07/29/25  Diet Low Sodium (2 gm) Standard Tray  Voiding Characteristics: oliguria  Andrez Score: 19  Fall Risk Score: 19  Accucheck []   Freq?      Lines/Drains/Airways       Peripheral Intravenous Line  Duration             Peripheral IV 07/29/25 0809 22 G Left;Posterior Forearm <1 day

## 2025-07-30 NOTE — ASSESSMENT & PLAN NOTE
Lab Results   Component Value Date    CHOL 91 (L) 07/25/2025    HDL 47 07/25/2025    LDLCALC 21.2 (L) 07/25/2025    TRIG 114 07/25/2025     -continue statin therapy   no chest pain, no cough, and no shortness of breath.

## 2025-07-30 NOTE — PROGRESS NOTES
[facility]  Progress Note    Patient Name: Violet Swenson  MRN: [id number]  Patient Class: IP- Inpatient   Admission Date: 7/28/2025  Length of Stay: 1 days  Attending Physician: Cliff Berumen MD  Primary Care Provider: Marti Coronel MD        Subjective     Principal Problem:Acute on chronic kidney failure        HPI:  Violet Swenson is a 85 year old female who  has a past medical history of Age-related osteoporosis without current pathological fracture, Anemia, Anxiety, Arthritis, Atrial flutter, Cancer, CHF (congestive heart failure), Chronic anemia, Chronic midline low back pain with right-sided sciatica, Coronary artery disease, Depression, Disorder of kidney and ureter, Encounter for blood transfusion, GERD (gastroesophageal reflux disease), Gout, arthritis, Heart failure, psychiatric care, Hyperlipidemia, Hypertension, Hypothyroidism, Immune deficiency disorder, Kidney disease, Lung nodule, Obesity, Pacemaker, Paroxysmal atrial fibrillation, Pneumonia, Polyneuropathy, Psychiatric problem, Rheumatoid arthritis involving multiple sites with positive rheumatoid factor, Stroke, Tobacco dependence, Trouble in sleeping, and Unstable angina who presented to the Emergency Department for evaluation of abnormal lab values. She had labs drawn on 7/25 for pending angiogram. Creatinine noted to be 4.1. She was called this morning and informed to go to ED for evaluation. She is followed by Dr. Gil outpatient and plans for cardiac cath were in place.    In ED, WBC count 5.98K, Hgb/Hct 11.0/33.3, CO2 11, BUN 83, Creatinine 3.5, Phosphorus 8.9, lactic acid 0.8. UA showed 1+ blood and 3+ leukos. CXR with no acute findings. Renal US showed no significant hydronephrosis. 1.3 cm right upper pole renal cyst. She received  mL bolus and one time dose of Ceftriaxone 1 gm IV. Following a comprehensive evaluation of the patient's clinical presentation, vital  signs, laboratory results, and risk factors, myself with the attending made  the active decision to admit the patient for further monitoring, diagnostic work-up, and initiation of appropriate treatment, given the potential for clinical deterioration if managed in an outpatient setting.     Overview/Hospital Course:  Patient sent from PCP office for NATHALIE.  Renal US showed 1.3 cm right upper pole cyst, but no hydronephrosis.  Renal function improving with IV fluids.  Patient had a fall on 7/29 and stated that she thinks she hit her head on the back.  No LOC, visual disturbance, nausea/vomiting, or HA.  CT head showed no acute findings.    Interval History: Follow up for NATHALIE, hyperchloremia, and fall.  Patient's NATHALIE is improving but due to hyperchloremia fluids were changed to D5 1/2NS.  Will repeat BMP in AM with plans for DC tomorrow.  NO new complaints.    Review of Systems  Objective:     Vital Signs (Most Recent):  Temp: 97.9 °F (36.6 °C) (07/30/25 1306)  Pulse: (!) 59 (07/30/25 1331)  Resp: 18 (07/30/25 1306)  BP: (!) 168/84 (07/30/25 1306)  SpO2: 100 % (07/30/25 1306) Vital Signs (24h Range):  Temp:  [97.2 °F (36.2 °C)-99 °F (37.2 °C)] 97.9 °F (36.6 °C)  Pulse:  [58-64] 59  Resp:  [18] 18  SpO2:  [90 %-100 %] 100 %  BP: (121-168)/(7-84) 168/84     Weight: 59.8 kg (131 lb 13.4 oz)  Body mass index is 22.63 kg/m².    Intake/Output Summary (Last 24 hours) at 7/30/2025 1446  Last data filed at 7/30/2025 0951  Gross per 24 hour   Intake 2187.1 ml   Output --   Net 2187.1 ml         Physical Exam  Vitals and nursing note reviewed.   Constitutional:       Appearance: Normal appearance.   HENT:      Head: Normocephalic and atraumatic.      Nose: Nose normal.      Mouth/Throat:      Mouth: Mucous membranes are moist.   Eyes:      Extraocular Movements: Extraocular movements intact.      Conjunctiva/sclera: Conjunctivae normal.   Cardiovascular:      Rate and Rhythm: Normal rate and regular rhythm.      Pulses: Normal  pulses.      Heart sounds: Normal heart sounds.   Pulmonary:      Effort: Pulmonary effort is normal.      Breath sounds: Normal breath sounds.   Abdominal:      General: Abdomen is flat. Bowel sounds are normal.      Palpations: Abdomen is soft.   Musculoskeletal:         General: Normal range of motion.      Cervical back: Normal range of motion and neck supple.   Skin:     General: Skin is warm.      Capillary Refill: Capillary refill takes less than 2 seconds.   Neurological:      Mental Status: She is alert and oriented to person, place, and time. Mental status is at baseline.   Psychiatric:         Mood and Affect: Mood normal.         Behavior: Behavior normal.               Significant Labs: All pertinent labs within the past 24 hours have been reviewed.  Recent Lab Results         07/30/25  0629        Anion Gap 10       BUN 59       Calcium 7.0       Chloride 122       CO2 12       Creatinine 2.6       eGFR 18  Comment: Estimated GFR calculated using the CKD-EPI creatinine (2021) equation.       Glucose 87       Potassium 3.2       Sodium 144               Significant Imaging: I have reviewed all pertinent imaging results/findings within the past 24 hours.      Assessment & Plan  Acute on chronic kidney failure  NATHALIE is likely due to pre-renal azotemia due to intravascular volume depletion. Baseline creatinine is around 1.9. Most recent creatinine and eGFR are listed below.  Recent Labs     07/29/25  0147 07/29/25  0218 07/30/25  0629   CREATININE 3.0* 3.0* 2.6*   EGFRNORACEVR 15* 15* 18*      Plan  - NATHALIE is improving  - Avoid nephrotoxins and renally dose meds for GFR listed above  - Monitor urine output, serial BMP, and adjust therapy as needed  - Renal US showed no significant hydronephrosis. 1.3 cm right upper pole renal cyst.  - IVF - change to D5 1/2 NS  - Improving  Essential hypertension  Patient's blood pressure range in the last 24 hours was: BP  Min: 121/66  Max: 168/84.The patient's inpatient  "anti-hypertensive regimen is listed below:  Current Antihypertensives  metoprolol tartrate (LOPRESSOR) tablet 25 mg, 2 times daily, Oral  hydrALAZINE injection 10 mg, Every 6 hours PRN, Intravenous    Plan  - BP is controlled, no changes needed to their regimen  - Hold Olmesartan and Furosemide due to NATHALIE  Mixed hyperlipidemia  Lab Results   Component Value Date    CHOL 91 (L) 07/25/2025    HDL 47 07/25/2025    LDLCALC 21.2 (L) 07/25/2025    TRIG 114 07/25/2025     -continue statin therapy  Hypothyroidism (acquired)  Lab Results   Component Value Date    TSH 3.585 07/25/2025    TSH 6.633 (H) 01/25/2025    TSH 5.875 (H) 06/19/2024      -Continue Levothyroxine  Permanent atrial fibrillation  Patient has permanent atrial fibrillation. Patient is currently in atrial fibrillation. [id number] Score: 4. The patients heart rate in the last 24 hours is as follows:  Pulse  Min: 58  Max: 64     Antiarrhythmics  metoprolol tartrate (LOPRESSOR) tablet 25 mg, 2 times daily, Oral    Anticoagulants  apixaban tablet 2.5 mg, 2 times daily, Oral    Plan  - Replete lytes with a goal of K>4, Mg >2  - Patient is anticoagulated, see medications listed above.  - Patient's afib is currently controlled  - Continue Eliquis and Metoprolol       Chronic diastolic heart failure  Patient has Diastolic (HFpEF) heart failure that is Chronic. On presentation their CHF was well compensated. Most recent BNP and echo results are listed below.  No results for input(s): "BNP" in the last 72 hours.  Latest ECHO  Results for orders placed during the hospital encounter of 05/19/25    Echo    Interpretation Summary    Left Ventricle: The left ventricle is normal in size. Mildly increased ventricular mass. Mildly increased wall thickness. There is mild concentric hypertrophy. There is low normal systolic function with a visually estimated ejection fraction of 50 - 55%. Grade III diastolic dysfunction. Elevated left ventricular filling pressure.    Right " Ventricle: The right ventricle is normal in size Wall thickness is normal. Systolic function is borderline low. Pacemaker lead present in the ventricle.    Left Atrium: The left atrium is mildly dilated    Right Atrium: The right atrium is mildly dilated .    Aortic Valve: There is moderate aortic valve sclerosis. Moderately restricted motion. There is moderate to severe stenosis. Aortic valve area by VTI is 0.8 cm². Aortic valve peak velocity is 2.4 m/s. Mean gradient is 12 mmHg. The dimensionless index is 0.26.    Mitral Valve: Mildly calcified leaflets. There is moderate mitral annular calcification. There is mild regurgitation.    Tricuspid Valve: There is mild regurgitation.    Pulmonary Artery: There is mild pulmonary hypertension. The estimated pulmonary artery systolic pressure is 44 mmHg.    IVC/SVC: Normal venous pressure at 3 mmHg.    Current Heart Failure Medications  hydrALAZINE injection 10 mg, Every 6 hours PRN, Intravenous    Plan  - Monitor strict I&Os and daily weights.    - Place on telemetry  - Low sodium diet  - Cardiology has not been consulted           Acute cystitis without hematuria  -UA with 3+ leukos  -No urine cx reflexed  -Continue Ceftriaxone 1 gm IV daily (D3/3) - complete    Hyperphosphatemia  Patient's most recent phosphorus results are listed below.   Recent Labs     07/28/25  1022 07/29/25  0147   PHOS 8.9* 8.0*     Plan  - Likely due to NATHALIE  - Continue IVF  - Monitor  - Improving  - repeat pending  Accident due to mechanical fall without injury  Patient has issues with falls at home.  PT/OT recommended home with .  Consult for CM placed.  Rolling walker ordered per patient request  VTE Risk Mitigation (From admission, onward)           Ordered     apixaban tablet 2.5 mg  2 times daily         07/28/25 1315     Reason for no Mechanical VTE Prophylaxis  Once        Comments: Currently on Eliquis for PAF   Question:  Reasons:  Answer:  Physician Provided (leave comment)     07/28/25 1315     IP VTE HIGH RISK PATIENT  Once         07/28/25 1315     Place sequential compression device  Until discontinued         07/28/25 1315                    Discharge Planning   LADARIUS: 7/30/2025     Code Status: Full Code   Medical Readiness for Discharge Date:   Discharge Plan A: Home, Home Health                  Please place Justification for DME      Cliff Berumen MD  Department of Hospital Medicine   [facility] - Telemetry ([facility])

## 2025-07-30 NOTE — ASSESSMENT & PLAN NOTE
Patient's blood pressure range in the last 24 hours was: BP  Min: 121/66  Max: 168/84.The patient's inpatient anti-hypertensive regimen is listed below:  Current Antihypertensives  metoprolol tartrate (LOPRESSOR) tablet 25 mg, 2 times daily, Oral  hydrALAZINE injection 10 mg, Every 6 hours PRN, Intravenous    Plan  - BP is controlled, no changes needed to their regimen  - Hold Olmesartan and Furosemide due to NATHALIE

## 2025-07-30 NOTE — ASSESSMENT & PLAN NOTE
"Patient has Diastolic (HFpEF) heart failure that is Chronic. On presentation their CHF was well compensated. Most recent BNP and echo results are listed below.  No results for input(s): "BNP" in the last 72 hours.  Latest ECHO  Results for orders placed during the hospital encounter of 05/19/25    Echo    Interpretation Summary    Left Ventricle: The left ventricle is normal in size. Mildly increased ventricular mass. Mildly increased wall thickness. There is mild concentric hypertrophy. There is low normal systolic function with a visually estimated ejection fraction of 50 - 55%. Grade III diastolic dysfunction. Elevated left ventricular filling pressure.    Right Ventricle: The right ventricle is normal in size Wall thickness is normal. Systolic function is borderline low. Pacemaker lead present in the ventricle.    Left Atrium: The left atrium is mildly dilated    Right Atrium: The right atrium is mildly dilated .    Aortic Valve: There is moderate aortic valve sclerosis. Moderately restricted motion. There is moderate to severe stenosis. Aortic valve area by VTI is 0.8 cm². Aortic valve peak velocity is 2.4 m/s. Mean gradient is 12 mmHg. The dimensionless index is 0.26.    Mitral Valve: Mildly calcified leaflets. There is moderate mitral annular calcification. There is mild regurgitation.    Tricuspid Valve: There is mild regurgitation.    Pulmonary Artery: There is mild pulmonary hypertension. The estimated pulmonary artery systolic pressure is 44 mmHg.    IVC/SVC: Normal venous pressure at 3 mmHg.    Current Heart Failure Medications  hydrALAZINE injection 10 mg, Every 6 hours PRN, Intravenous    Plan  - Monitor strict I&Os and daily weights.    - Place on telemetry  - Low sodium diet  - Cardiology has not been consulted           "

## 2025-07-30 NOTE — ASSESSMENT & PLAN NOTE
Patient's most recent phosphorus results are listed below.   Recent Labs     07/28/25  1022 07/29/25  0147   PHOS 8.9* 8.0*     Plan  - Likely due to NATHALIE  - Continue IVF  - Monitor  - Improving  - repeat pending

## 2025-07-31 LAB
ANION GAP (OHS): 10 MMOL/L (ref 8–16)
BUN SERPL-MCNC: 51 MG/DL (ref 8–23)
CALCIUM SERPL-MCNC: 7.5 MG/DL (ref 8.7–10.5)
CHLORIDE SERPL-SCNC: 122 MMOL/L (ref 95–110)
CO2 SERPL-SCNC: 11 MMOL/L (ref 23–29)
CREAT SERPL-MCNC: 2.9 MG/DL (ref 0.5–1.4)
GFR SERPLBLD CREATININE-BSD FMLA CKD-EPI: 16 ML/MIN/1.73/M2
GLUCOSE SERPL-MCNC: 94 MG/DL (ref 70–110)
PHOSPHATE SERPL-MCNC: 5.3 MG/DL (ref 2.7–4.5)
POTASSIUM SERPL-SCNC: 4.7 MMOL/L (ref 3.5–5.1)
SODIUM SERPL-SCNC: 143 MMOL/L (ref 136–145)

## 2025-07-31 PROCEDURE — 21400001 HC TELEMETRY ROOM: Mod: HCNC

## 2025-07-31 PROCEDURE — 97530 THERAPEUTIC ACTIVITIES: CPT | Mod: HCNC

## 2025-07-31 PROCEDURE — 25000003 PHARM REV CODE 250: Mod: HCNC | Performed by: NURSE PRACTITIONER

## 2025-07-31 PROCEDURE — 99223 1ST HOSP IP/OBS HIGH 75: CPT | Mod: HCNC,,, | Performed by: NURSE PRACTITIONER

## 2025-07-31 PROCEDURE — 36415 COLL VENOUS BLD VENIPUNCTURE: CPT | Mod: HCNC | Performed by: FAMILY MEDICINE

## 2025-07-31 PROCEDURE — 25000003 PHARM REV CODE 250: Mod: HCNC | Performed by: FAMILY MEDICINE

## 2025-07-31 PROCEDURE — S5010 5% DEXTROSE AND 0.45% SALINE: HCPCS | Mod: HCNC | Performed by: FAMILY MEDICINE

## 2025-07-31 PROCEDURE — 97110 THERAPEUTIC EXERCISES: CPT | Mod: HCNC

## 2025-07-31 PROCEDURE — 97116 GAIT TRAINING THERAPY: CPT | Mod: HCNC

## 2025-07-31 PROCEDURE — 82565 ASSAY OF CREATININE: CPT | Mod: HCNC | Performed by: FAMILY MEDICINE

## 2025-07-31 PROCEDURE — 84100 ASSAY OF PHOSPHORUS: CPT | Mod: HCNC | Performed by: FAMILY MEDICINE

## 2025-07-31 RX ADMIN — APIXABAN 2.5 MG: 2.5 TABLET, FILM COATED ORAL at 09:07

## 2025-07-31 RX ADMIN — METOPROLOL TARTRATE 25 MG: 25 TABLET, FILM COATED ORAL at 08:07

## 2025-07-31 RX ADMIN — POTASSIUM CHLORIDE 40 MEQ: 1500 TABLET, EXTENDED RELEASE ORAL at 09:07

## 2025-07-31 RX ADMIN — SODIUM BICARBONATE 650 MG: 650 TABLET ORAL at 09:07

## 2025-07-31 RX ADMIN — CLOPIDOGREL 75 MG: 75 TABLET ORAL at 09:07

## 2025-07-31 RX ADMIN — METOPROLOL TARTRATE 25 MG: 25 TABLET, FILM COATED ORAL at 09:07

## 2025-07-31 RX ADMIN — POTASSIUM CHLORIDE 40 MEQ: 1500 TABLET, EXTENDED RELEASE ORAL at 08:07

## 2025-07-31 RX ADMIN — PRAVASTATIN SODIUM 40 MG: 20 TABLET ORAL at 09:07

## 2025-07-31 RX ADMIN — DEXTROSE AND SODIUM CHLORIDE: 5; 450 INJECTION, SOLUTION INTRAVENOUS at 11:07

## 2025-07-31 RX ADMIN — HYDROXYCHLOROQUINE SULFATE 200 MG: 200 TABLET, FILM COATED ORAL at 09:07

## 2025-07-31 RX ADMIN — SODIUM BICARBONATE 650 MG: 650 TABLET ORAL at 08:07

## 2025-07-31 RX ADMIN — LEVOTHYROXINE SODIUM 112 MCG: 0.11 TABLET ORAL at 05:07

## 2025-07-31 RX ADMIN — APIXABAN 2.5 MG: 2.5 TABLET, FILM COATED ORAL at 08:07

## 2025-07-31 RX ADMIN — SERTRALINE HYDROCHLORIDE 150 MG: 50 TABLET ORAL at 09:07

## 2025-07-31 NOTE — PLAN OF CARE
07/31/25 1027   Post-Acute Status   Post-Acute Authorization Home Health;HME   HME Status (!) Pending Delivery   Home Health Status Set-up Complete/Auth obtained   Discharge Delays None known at this time   Discharge Plan   Discharge Plan A Home Health;Home     Met with patient to review discharge recommendation of home health and is agreeable to plan    Patient/family declined list of facilities in-network with patient's payor plan. Providers that are owned, operated, or affiliated with [facility] are included on the list.     Notified that referral sent to below listed facilities from in-network list based on proximity to home/family support:   [facility]      Patient/family instructed to identify preference.    Preferred Facility: (if more than 1, listed in order of descending preference)  [facility]    If an additional preferred facility not listed above is identified, additional referral to be sent. If above facilities unable to accept, will send additional referrals to in-network providers.     [facility] reviewing order for RW. [facility] rep to make contact with patient once son arrives with payment.   [facility] accepting referral; AVS updated.

## 2025-07-31 NOTE — ASSESSMENT & PLAN NOTE
Patient's most recent phosphorus results are listed below.   Recent Labs     07/29/25  0147 07/31/25  0450   PHOS 8.0* 5.3*     Plan  - Likely due to NATHALIE  - Continue IVF  - Monitor  - Improving  - repeat pending

## 2025-07-31 NOTE — SUBJECTIVE & OBJECTIVE
Interval History:  Follow up acute on chronic kidney injury, baseline creatinine 1.7-1.9 but currently 2.9.  She did have a mild improvement until yesterday but it worsened again today.  Nephrology has been consulted.  They recommended continuing doing IV fluids and repeat labs in the morning.  Patient did have 2 bouts of vomiting yesterday but this has been chronic since 1993 after having abdominal surgery.  She states that she intermittently has bouts of vomiting but clears and may not have another episode for 1-2 weeks.  Patient doing well this morning and we will plan for discharge tomorrow if renal function improves.  Patient has had an appointment with Nephrology on September 5th which has been moved to August 13th @ 8:30AM at the [facility].    Review of Systems  Objective:     Vital Signs (Most Recent):  Temp: 97.6 °F (36.4 °C) (07/31/25 1202)  Pulse: 60 (07/31/25 1202)  Resp: 16 (07/31/25 1202)  BP: (!) 141/63 (07/31/25 1202)  SpO2: (!) 93 % (07/31/25 1202) Vital Signs (24h Range):  Temp:  [97.3 °F (36.3 °C)-97.6 °F (36.4 °C)] 97.6 °F (36.4 °C)  Pulse:  [58-68] 60  Resp:  [16-18] 16  SpO2:  [92 %-100 %] 93 %  BP: (102-162)/(57-85) 141/63     Weight: 59.8 kg (131 lb 13.4 oz)  Body mass index is 22.63 kg/m².    Intake/Output Summary (Last 24 hours) at 7/31/2025 1420  Last data filed at 7/31/2025 0940  Gross per 24 hour   Intake 300 ml   Output --   Net 300 ml         Physical Exam  Vitals and nursing note reviewed.   Constitutional:       Appearance: Normal appearance.   HENT:      Head: Normocephalic and atraumatic.      Nose: Nose normal.      Mouth/Throat:      Mouth: Mucous membranes are moist.   Eyes:      Extraocular Movements: Extraocular movements intact.      Conjunctiva/sclera: Conjunctivae normal.   Cardiovascular:      Rate and Rhythm: Normal rate and regular rhythm.      Pulses: Normal pulses.      Heart sounds: Normal heart sounds.   Pulmonary:      Effort: Pulmonary effort is normal.      Breath  sounds: Normal breath sounds.   Abdominal:      General: Abdomen is flat. Bowel sounds are normal.      Palpations: Abdomen is soft.   Musculoskeletal:         General: Normal range of motion.      Cervical back: Normal range of motion and neck supple.   Skin:     General: Skin is warm.      Capillary Refill: Capillary refill takes less than 2 seconds.   Neurological:      Mental Status: She is alert and oriented to person, place, and time. Mental status is at baseline.   Psychiatric:         Mood and Affect: Mood normal.         Behavior: Behavior normal.               Significant Labs: All pertinent labs within the past 24 hours have been reviewed.  Recent Lab Results         07/31/25  0450        Anion Gap 10  Comment: UNABLE TO CALCULATE       BUN 51       Calcium 7.5       Chloride 122       CO2 11       Creatinine 2.9       eGFR 16  Comment: Estimated GFR calculated using the CKD-EPI creatinine (2021) equation.       Glucose 94       Phosphorus Level 5.3       Potassium 4.7       Sodium 143               Significant Imaging: I have reviewed all pertinent imaging results/findings within the past 24 hours.

## 2025-07-31 NOTE — ASSESSMENT & PLAN NOTE
Patient's blood pressure range in the last 24 hours was: BP  Min: 102/57  Max: 162/83.The patient's inpatient anti-hypertensive regimen is listed below:  Current Antihypertensives  metoprolol tartrate (LOPRESSOR) tablet 25 mg, 2 times daily, Oral  hydrALAZINE injection 10 mg, Every 6 hours PRN, Intravenous    Plan  - BP is controlled, no changes needed to their regimen  - Hold Olmesartan and Furosemide due to NATHALIE

## 2025-07-31 NOTE — CONSULTS
"Nephrology Consultation  Consulting Physician:  Dr. Berumen  Reason for consultation:  NATHALIE on CKD  Informants: patient and chart.      History of Present Illness   The patient is a 85 y.o. female with a hx of CKD followed by Dr. Goetz with [facility] nephrology with an upcoming appt in September seems last time she was seen was in 2023. BL serum creatinine has been the low 1s to about 1.9 seems to fluctuate based upon her "stomach issues" at that time as she intermittently has n/v and diarrhea that reportedly has been worked up and has been an ongoing issue for year now.  She also has a history of a flutter, congestive heart failure, hypertension, pacemaker placement, and CAD. Patient is followed by Dr. Gil with cardiology and plan was for patient to undergo cardiac catheterization to evaluate for coronary artery stenosis as well as narrowing of aortic valve.  She has been maintained on Eliquis for atrial fibrillation as well as olmesartan with recent reduction in dose from 20 mg to 10 mg due to some dizziness and low blood pressure per cardiology on 7/17/25.  She has also been on on lasix. She had labs drawn on 7/25/25 with a serum creatinine of 4.1 and she was directed to ER for which she has been admitted. She was started on IVF and ARB, diuretic were appropriately held. Her serum creatinine improved to 2.6 on labs yesterday. Most recent labs, day of consult for NATHALIE on CKD from today 7/31/25 BUN 51 creatinine of 2.9 potassium is 4.7 bicarb 11    Patient seen at bedside receiving IV fluids.  She reports 3 episodes of vomiting yesterday which has resolved.  No reported nausea or diarrhea presently.  No chest pain or shortness of breath. She is making adequate urine output.       PMHx:    Past Medical History:   Diagnosis Date    Age-related osteoporosis without current pathological fracture 8/20/2018    Anemia     Anxiety     Arthritis     Atrial flutter     Cancer     lymphoma Large cell B    CHF (congestive heart " failure)     Chronic anemia 4/26/2017    Chronic midline low back pain with right-sided sciatica 8/20/2018    Coronary artery disease     01/2015 LHC patent LCX. 50% stenosis in LAD and RCA.      Depression     Disorder of kidney and ureter     Encounter for blood transfusion     GERD (gastroesophageal reflux disease)     Gout, arthritis     Heart failure     Hx of psychiatric care     Hyperlipidemia     Hypertension     Hypothyroidism     Immune deficiency disorder     Kidney disease     Lung nodule 2014    RML--stable    Obesity     Pacemaker     Metronic    Paroxysmal atrial fibrillation 3/15/2018    Pneumonia     Polyneuropathy     chemo induced    Psychiatric problem     Rheumatoid arthritis involving multiple sites with positive rheumatoid factor 4/19/2023    Stroke 11/16/2020    Tobacco dependence     quit 1976    Trouble in sleeping     Unstable angina 11/27/2020         PSHx:  Past Surgical History:   Procedure Laterality Date    APPENDECTOMY  1966 approx    CARDIAC PACEMAKER PLACEMENT  01/22/2015    CHOLECYSTECTOMY  1993    incidental at time of gastric bypass    COLON SURGERY Right 2017    hemicolectomy    COLONOSCOPY N/A 4/6/2017    Procedure: COLONOSCOPY;  Surgeon: Tye Enamorado MD;  Location: [facility];  Service: Endoscopy;  Laterality: N/A;    COLONOSCOPY N/A 11/28/2018    Procedure: COLONOSCOPY;  Surgeon: Saúl Arthur III, MD;  Location: [facility];  Service: Endoscopy;  Laterality: N/A;    CORONARY ANGIOPLASTY  02/2014    CORONARY STENT PLACEMENT  02/05/2014    ESOPHAGOGASTRODUODENOSCOPY N/A 11/28/2018    Procedure: EGD (ESOPHAGOGASTRODUODENOSCOPY);  Surgeon: Saúl Arthur III, MD;  Location: [facility];  Service: Endoscopy;  Laterality: N/A;    GASTRIC BYPASS  1993    with incidental choly    HERNIA REPAIR      IMPLANTATION OF BIVENTRICULAR PERMANENT PACEMAKER AS UPGRADE TO EXISTING PACEMAKER Left 7/13/2023    Procedure: Biventricular pacemaker upgrade/His lead vs CRT-P;  Surgeon: Velasquez CHENG  "MD Charis;  Location: [facility] CATH LAB;  Service: Cardiology;  Laterality: Left;  Will need to upgrade her pacemaker.  Ideally his pacing lead would be the best approach. MDT/ Alternatively, an upgrade /His lead as Plan A  and  CRT if fails/notified MDT rep Mell and Arik  NOT MRI safe   Pacer and leads implanted    INJECTION OF ANESTHETIC AGENT INTO SACROILIAC JOINT Right 10/8/2020    Procedure: Right BLOCK, SACROILIAC JOINT with RN IV sedation;  Surgeon: Madi Anton MD;  Location: [facility];  Service: Pain Management;  Laterality: Right;    JOINT REPLACEMENT Bilateral 2009    3 months apart    TONSILLECTOMY  1959    VENOGRAM, CATH LAB Bilateral 6/29/2023    Procedure: Venogram, Cath Lab;  Surgeon: Velasquez Vizcaino MD;  Location: [facility] CATH LAB;  Service: Cardiology;  Laterality: Bilateral;  1:00PM arrival time  NOT MRI safe   Pacer and leads implanted by Kian, 1/22/15   MDTR SEDR01 Sensia, [id number]   A lead: SAMEER 5076 CapSure Fix Novus, [id number]   RV lead: SAMEER 5076 CapSure Fix Novus, [id number]         SocHx:    Social History[1]      FamHx:    Family History   Problem Relation Name Age of Onset    Heart disease Mother      Hypertension Mother      Cataracts Mother      Stomach cancer Father          "ulcers that turned to cancer"    Cancer Father          stomach    Pancreatic cancer Sister      Cancer Sister          pancreatic    Leukemia Brother          "leukemia which led to intestinal cancer"    Cataracts Brother      Cancer Brother          leukemia then later stomach cancer    Drug abuse Son      Cancer Son          prostate cancer    Prostate cancer Son      COPD Daughter      Asthma Daughter      Hypertension Maternal Grandfather      Stroke Maternal Grandfather      Alcohol abuse Neg Hx      Diabetes Neg Hx      Intellectual disability Neg Hx      Mental illness Neg Hx           ROS:    Review of Systems   Constitutional:  Positive for malaise/fatigue. Negative for fever.   Eyes:  " "Negative for double vision.   Respiratory:  Negative for sputum production.    Cardiovascular:  Negative for chest pain and leg swelling.   Gastrointestinal:  Negative for abdominal pain.        As above.    Genitourinary:  Negative for hematuria.   Neurological:  Negative for dizziness and focal weakness.   Psychiatric/Behavioral:  Negative for hallucinations.         Allergies:    is allergic to corticosteroids (glucocorticoids) and oxycodone.    Current medications:   Scheduled Meds:   apixaban  2.5 mg Oral BID    calcitRIOL  0.25 mcg Oral Every Mon, Wed, Fri    clopidogreL  75 mg Oral Daily    hydroxychloroquine  200 mg Oral Every other day    levothyroxine  112 mcg Oral Before breakfast    metoprolol tartrate  25 mg Oral BID    potassium chloride  40 mEq Oral BID    pravastatin  40 mg Oral Daily    sertraline  150 mg Oral Daily    sodium bicarbonate  650 mg Oral BID     Continuous Infusions:   D5 and 0.45% NaCl   Intravenous Continuous 75 mL/hr at 07/30/25 2119 New Bag at 07/30/25 2119     PRN Meds:.  Current Facility-Administered Medications:     acetaminophen, 650 mg, Oral, Q8H PRN    hydrALAZINE, 10 mg, Intravenous, Q6H PRN    ondansetron, 4 mg, Intravenous, Q8H PRN    prochlorperazine, 5 mg, Intravenous, Q6H PRN    sodium chloride 0.9%, 10 mL, Intravenous, PRN    traMADoL, 50 mg, Oral, Q12H PRN       Physical Examination    VS/Measurements   BP (!) 140/67   Pulse (!) 59   Temp 97.6 °F (36.4 °C) (Oral)   Resp 16   Ht 5' 4" (1.626 m)   Wt 59.8 kg (131 lb 13.4 oz)   SpO2 100%   BMI 22.63 kg/m²     Physical Exam  Vitals reviewed.   Constitutional:       General: She is not in acute distress.     Appearance: Normal appearance. She is not ill-appearing or toxic-appearing.      Comments: Elderly lady in on acute distress.    HENT:      Head: Normocephalic and atraumatic.   Cardiovascular:      Rate and Rhythm: Normal rate and regular rhythm.      Heart sounds: Murmur heard.   Pulmonary:      Effort: " "Pulmonary effort is normal. No respiratory distress.      Breath sounds: Normal breath sounds.   Abdominal:      Palpations: Abdomen is soft.   Musculoskeletal:         General: No swelling.      Cervical back: Neck supple.   Skin:     General: Skin is warm and dry.   Neurological:      General: No focal deficit present.      Mental Status: She is alert and oriented to person, place, and time.   Psychiatric:         Mood and Affect: Mood normal.         Behavior: Behavior normal.              Laboratory Results   Today's Lab Results :    Recent Results (from the past 24 hours)   Phosphorus    Collection Time: 07/31/25  4:50 AM   Result Value Ref Range    Phosphorus Level 5.3 (H) 2.7 - 4.5 mg/dL   Basic Metabolic Panel    Collection Time: 07/31/25  4:50 AM   Result Value Ref Range    Sodium 143 136 - 145 mmol/L    Potassium 4.7 3.5 - 5.1 mmol/L    Chloride 122 (H) 95 - 110 mmol/L    CO2 11 (L) 23 - 29 mmol/L    Glucose 94 70 - 110 mg/dL    BUN 51 (H) 8 - 23 mg/dL    Creatinine 2.9 (H) 0.5 - 1.4 mg/dL    Calcium 7.5 (L) 8.7 - 10.5 mg/dL    Anion Gap 10 8 - 16 mmol/L    eGFR 16 (L) >60 mL/min/1.73/m2        Assessment and Plan     NATHALIE on CKD. BL serum creatinine has been in the low 1s to about 1.9 seems to fluctuate based upon her "stomach issues" at that time as she intermittently has n/v that reportedly has been worked up in the past and has been an "ongoing issue for years now". She has also been on an ARB and lasix in outpatient setting  --NATHALIE likely from some IVVD in setting of n/v while being on ARB and diuretic. Also possibly some ATN? She saw her cardiologist just prior to the labs drawn when her serum creatinine was elevated at 4.1 noted to be hypotensive with some dizziness.  --renal function has shown improvement with IV with slight trend up on labs today after episode of vomiting yesterday. She is followed by Dr. Goetz with nephrology with an appt early september. I spoke with hospital medicine and she is " working on seeing if this appt can be moved up for close follow up. For now continue with IVF and monitor of labs and urine output. Not uremic and volume controlled. No acute need for RRT. I would recommend continuing to hold ARB.     Metabolic acidosis. On sodium bicarb. Follow labs    Hypokalemia. Resolved. On PO supplement - but diuretic is on hold presently so would watch dosing of KCL.     HTN. Controlled. Avoid hypotension in setting of NATHALIE. Recommend continuing to hold ARB.     Afib. Rate controlled. On b-blocker    Chronic diastolic HF. Compensated    CAD followed by Dr. Gil with cardiology with plans for patient to undergo cardiac catheterization to evaluate for coronary artery stenosis as well as narrowing of aortic valve, now placed on hold due to worsening renal function.     UTI s/p abx. asymptomatic      ________________________________________________  Nimisha Ahuja         [1]   Social History  Socioeconomic History    Marital status:     Number of children: 4   Occupational History    Occupation: Retired   Tobacco Use    Smoking status: Former     Current packs/day: 0.00     Average packs/day: 2.0 packs/day for 6.0 years (12.0 ttl pk-yrs)     Types: Cigarettes     Start date: 3/15/1970     Quit date: 3/15/1976     Years since quittin.4    Smokeless tobacco: Never   Substance and Sexual Activity    Alcohol use: No     Alcohol/week: 0.0 standard drinks of alcohol    Drug use: No    Sexual activity: Not Currently     Partners: Male   Social History Narrative    , lives with . She is a retired . 4 children (3 natural born, 1 adopted)- 2 ; 2x  (currently 17 years); son has prostate cancer, daughter COPD,  cardiac difficulty. She still drives. Does not have a Living will or Advanced Directive.     Social Drivers of Health     Financial Resource Strain: Low Risk  (2025)    Overall Financial Resource Strain (CARDIA)     Difficulty of Paying  Living Expenses: Not hard at all   Food Insecurity: No Food Insecurity (7/28/2025)    Hunger Vital Sign     Worried About Running Out of Food in the Last Year: Never true     Ran Out of Food in the Last Year: Never true   Transportation Needs: No Transportation Needs (7/28/2025)    PRAPARE - Transportation     Lack of Transportation (Medical): No     Lack of Transportation (Non-Medical): No   Physical Activity: Inactive (11/2/2021)    Exercise Vital Sign     Days of Exercise per Week: 0 days     Minutes of Exercise per Session: 0 min   Stress: Stress Concern Present (7/28/2025)    [employer] [place] [employer] - Occupational Stress Questionnaire     Feeling of Stress : To some extent   Housing Stability: Low Risk  (7/28/2025)    Housing Stability Vital Sign     Unable to Pay for Housing in the Last Year: No     Homeless in the Last Year: No

## 2025-07-31 NOTE — PT/OT/SLP PROGRESS
"Physical Therapy Treatment    Patient Name:  Violet Swenson   MRN:  [id number]    Recommendations:     Discharge Recommendations: Low Intensity Therapy  Discharge Equipment Recommendations: walker, rolling  Barriers to discharge: None    Assessment:     Violet Swenson is a 85 y.o. female admitted with a medical diagnosis of Acute on chronic kidney failure.  She presents with the following impairments/functional limitations: weakness, impaired endurance, impaired functional mobility, gait instability, impaired balance, decreased safety awareness, decreased lower extremity function, impaired cardiopulmonary response to activity, decreased ROM.    Rehab Prognosis: Good; patient would benefit from acute skilled PT services to address these deficits and reach maximum level of function.    Recent Surgery: * No surgery found *      Plan:     During this hospitalization, patient to be seen 3 x/week to address the identified rehab impairments via gait training, therapeutic activities, therapeutic exercises and progress toward the following goals:    Plan of Care Expires:  08/12/25    Subjective     Chief Complaint: Pt is motivated to participate. Reports "I feel more confident with the walker."  Patient/Family Comments/goals: none stated  Pain/Comfort:  Pain Rating 1: 0/10      Objective:     Communicated with epic chart review prior to session.  Patient found long sitting in bed with peripheral IV, telemetry, bed alarm (KASH SYS) upon PT entry to room.     General Precautions: Standard, fall  Orthopedic Precautions: N/A  Braces: N/A  Respiratory Status: Room air     Functional Mobility:  Gait Belt Applied - Yes   Socks/Shoes Donned - Yes  Bed Mobility  Scooting: supervision  Long Sitting to Sitting EOB: supervision  Transfers  Sit to Stand: stand by assistance with rolling walker  Bed to Chair: stand by assistance with rolling walker using Step Transfer  Gait  Patient ambulated 120ft with rolling " "walker and stand by assistance. Patient demonstrates occasional unsteady gait, improved with RW use vs SPC, slowed pace. No c/o dizziness or SOB. All lines remained intact throughout ambulation trial.  Balance  Sitting: supervision  Standing: stand by assistance    AM-PAC 6 CLICK MOBILITY  Turning over in bed (including adjusting bedclothes, sheets and blankets)?: 3  Sitting down on and standing up from a chair with arms (e.g., wheelchair, bedside commode, etc.): 3  Moving from lying on back to sitting on the side of the bed?: 3  Moving to and from a bed to a chair (including a wheelchair)?: 3  Need to walk in hospital room?: 3  Climbing 3-5 steps with a railing?: 1 (NT)  Basic Mobility Total Score: 16       Treatment & Education:  Reviewed role of PT in acute care and POC. Pt tolerated interventions well. Reviewed importance of OOB activities, activity pacing, and HEP (marching/hip flex, hip abd, heel slides/LAQ, quad sets, ankle pumps) in order to maintain/regain strength. Encouraged to sit up in chair for all meals. Reviewed proper use of RW for safety and to reduce risk of falling. Reviewed "call don't fall" policy and increased risk of falling due to weakness, instructed to utilize call bell for assistance with all transfers. Pt agreeable to all requests.    Patient left up in chair with all lines intact, call button in reach, and chair alarm on..    GOALS:   Multidisciplinary Problems       Physical Therapy Goals          Problem: Physical Therapy    Goal Priority Disciplines Outcome Interventions   Physical Therapy Goal     PT, PT/OT Progressing    Description: Goals to be met by 8/12/25.  1. Pt will complete bed mobility MOD I.  2. Pt will complete sit to stand MOD I.  3. Pt will ambulate 200ft MOD I using SPC.  4. Pt will increase AMPAC score by 2 points to progress functional mobility.                       DME Justifications:   Violet's mobility limitation cannot be sufficiently resolved by the use of a " cane. Her functional mobility deficit can be sufficiently resolved with the use of a Rolling Walker. Patient's mobility limitation significantly impairs their ability to participate in one of more activities of daily living.  The use of a RW will significantly improve the patient's ability to participate in MRADLS and the patient will use it on regular basis in the home.    Time Tracking:     PT Received On: 07/31/25  PT Start Time: 0800     PT Stop Time: 0823  PT Total Time (min): 23 min     Billable Minutes: Gait Training 13min and Therapeutic Activity 10min    Treatment Type: Treatment  PT/PTA: PT     Number of PTA visits since last PT visit: 0     07/31/2025

## 2025-07-31 NOTE — PT/OT/SLP PROGRESS
Occupational Therapy  Treatment    Violet Swenson   MRN: [id number]   Admitting Diagnosis: Acute on chronic kidney failure    OT Date of Treatment: 07/31/25   OT Start Time: 1400  OT Stop Time: 1423  OT Total Time (min): 23 min    Billable Minutes:  Therapeutic Activity 13 minutes and Therapeutic Exercise 10 minutes    OT/JAMES: OT     Number of JAMES visits since last OT visit: 0    General Precautions: Standard, fall  Orthopedic Precautions: N/A  Braces: N/A  Respiratory Status: Room air         Subjective:  Communicated with nurse and epic chart review prior to session.    Pain/Comfort  Pain Rating 1: 0/10    Objective:  Functional Mobility:  Bed Mobility:  SBA with rolling L<>R  SBA with supine<>sit    Transfers:   SBA with sit<>stand transfers with cues for hand placement        Functional Ambulation: pt ambulated 100 feet x 2 with functional mobility with rolling walker at slow steady pace with IV in tow       Balance:   Static Sit: GOOD-: Takes MODERATE challenges from all directions but inconsistently  Dynamic Sit: FAIR+: Maintains balance through MINIMAL excursions of active trunk motion  Static Stand: FAIR+: Takes MINIMAL challenges from all directions  Dynamic stand: FAIR+: Needs CLOSE SUPERVISION during gait and is able to right self with minor LOB    Therapeutic Activities and Exercises:  Educated patient on importance of increased tolerance to upright position and direct impact on CV endurance and strength. Patient encouraged to sit up in chair, for a minimum of 1-2 consecutive hours including for all meals with staff. Pt educated on HEP. Pt performed 1 set x 10 reps spc exercise (elbow flex/ext, wrist flex/ext/ chest press) as well as shoulder abd/add. Encouraged patient to perform AROM TE to BUE throughout the day within all available planes of motion. Re enforced importance of utilizing call light to meet needs in room and not attempt to get up without staff assistance. Patient verbalized  "understanding and agreed to comply.           AM-PAC 6 CLICK ADL   How much help from another person does this patient currently need?   1 = Unable, Total/Dependent Assistance  2 = A lot, Maximum/Moderate Assistance  3 = A little, Minimum/Contact Guard/Supervision  4 = None, Modified [place]/Independent    Putting on and taking off regular lower body clothing? : 3  Bathing (including washing, rinsing, drying)?: 3  Toileting, which includes using toilet, bedpan, or urinal? : 3  Putting on and taking off regular upper body clothing?: 3  Taking care of personal grooming such as brushing teeth?: 4  Eating meals?: 4  Daily Activity Total Score: 20     AM-PAC Raw Score CMS "G-Code Modifier Level of Impairment Assistance   6 % Total / Unable   7 - 8 CM 80 - 100% Maximal Assist   9-13 CL 60 - 80% Moderate Assist   14 - 19 CK 40 - 60% Moderate Assist   20 - 22 CJ 20 - 40% Minimal Assist   23 CI 1-20% SBA / CGA   24 CH 0% Independent/ Mod I       Patient left HOB elevated with all lines intact, call button in reach, bed alarm on, nurse notified, and hernesto system present    ASSESSMENT:  Violet Swenson is a 85 y.o. female with a medical diagnosis of Acute on chronic kidney failure and presents with hernesto .      Rehab potential is good.    Activity tolerance: Good    Discharge recommendations: Low Intensity Therapy   Barriers to discharge: Barriers to Discharge: Decreased caregiver support    Equipment recommendations: walker, rolling    GOALS:   Multidisciplinary Problems       Occupational Therapy Goals          Problem: Occupational Therapy    Goal Priority Disciplines Outcome Interventions   Occupational Therapy Goal     OT, PT/OT Progressing    Description: Goals to be met by: 8/12/25     Patient will increase functional independence with ADLs by performing:    Grooming while standing at sink with Supervision.  Toileting from toilet with Supervision for hygiene and clothing management.   Toilet transfer " to toilet with Supervision using SPC.  Upper extremity exercise program x12 reps per handout, with independence.                         DME Justifications:   Violet's mobility limitation cannot be sufficiently resolved by the use of a cane. Her functional mobility deficit can be sufficiently resolved with the use of a Rolling Walker. Patient's mobility limitation significantly impairs their ability to participate in one of more activities of daily living.  The use of a RW will significantly improve the patient's ability to participate in MRADLS and the patient will use it on regular basis in the home.    Plan:  Patient to be seen 2 x/week to address the above listed problems via self-care/home management, therapeutic exercises, therapeutic activities  Plan of Care expires: 08/12/25  Plan of Care reviewed with: patient         07/31/2025

## 2025-07-31 NOTE — PLAN OF CARE
Pt tolerated intervention well. Required SPV for bed mobility, ambulated 120ft SBA using RW. Recommending low intensity therapy with RW.

## 2025-07-31 NOTE — PLAN OF CARE
A243/A243 KEELEYLino Swenson is a 85 y.o.female admitted on 7/28/2025 for Acute on chronic kidney failure   Code Status: Full Code MRN: [id number]   Review of patient's allergies indicates:   Allergen Reactions    Corticosteroids (glucocorticoids) Nausea Only and Other (See Comments)     Stomach pain, dizziness, headache    Oxycodone Other (See Comments)     Blood pressure dropped     Past Medical History:   Diagnosis Date    Age-related osteoporosis without current pathological fracture 8/20/2018    Anemia     Anxiety     Arthritis     Atrial flutter     Cancer     lymphoma Large cell B    CHF (congestive heart failure)     Chronic anemia 4/26/2017    Chronic midline low back pain with right-sided sciatica 8/20/2018    Coronary artery disease     01/2015 LHC patent LCX. 50% stenosis in LAD and RCA.      Depression     Disorder of kidney and ureter     Encounter for blood transfusion     GERD (gastroesophageal reflux disease)     Gout, arthritis     Heart failure     Hx of psychiatric care     Hyperlipidemia     Hypertension     Hypothyroidism     Immune deficiency disorder     Kidney disease     Lung nodule 2014    RML--stable    Obesity     Pacemaker     Metronic    Paroxysmal atrial fibrillation 3/15/2018    Pneumonia     Polyneuropathy     chemo induced    Psychiatric problem     Rheumatoid arthritis involving multiple sites with positive rheumatoid factor 4/19/2023    Stroke 11/16/2020    Tobacco dependence     quit 1976    Trouble in sleeping     Unstable angina 11/27/2020      PRN meds    acetaminophen, 650 mg, Q8H PRN  hydrALAZINE, 10 mg, Q6H PRN  ondansetron, 4 mg, Q8H PRN  prochlorperazine, 5 mg, Q6H PRN  sodium chloride 0.9%, 10 mL, PRN  traMADoL, 50 mg, Q12H PRN      Chart check completed. Will continue plan of care.         Cushing Coma Scale Score: 15     Lead Monitored: Lead II Rhythm: normal sinus rhythm Frequency/Ectopy: PVCs  Cardiac/Telemetry Box Number: [id number]  VTE Core Measure:  Pharmacological prophylaxis initiated/maintained Last Bowel Movement: 07/29/25  Diet Low Sodium (2 gm) Standard Tray  Voiding Characteristics: oliguria  Andrez Score: 21  Fall Risk Score: 17  Accucheck []   Freq?      Lines/Drains/Airways       Peripheral Intravenous Line  Duration             Peripheral IV 07/29/25 0809 22 G Left;Posterior Forearm 1 day

## 2025-07-31 NOTE — DISCHARGE INSTRUCTIONS
Our goal at [facility] is to always give you outstanding care and exceptional service. You may receive a survey from [employer] by mail, text or e-mail in the next 24-48 hours asking about the care you received with us. The survey should only take 5-10 minutes to complete and is very important to us.     Your feedback provides us with a way to recognize our staff who work tirelessly to provide the best care! Also, your responses help us learn how to improve when your experience was below our aspiration of excellence. We are always looking for ways to improve your stay. We WILL use your feedback to continue making improvements to help us provide the highest quality care. We keep your personal information and feedback confidential. We appreciate your time completing this survey and can't wait to hear from you!!!    We look forward to your continued care with us! Thanks so much for choosing [facility] for your healthcare needs!

## 2025-07-31 NOTE — ASSESSMENT & PLAN NOTE
Patient has permanent atrial fibrillation. Patient is currently in atrial fibrillation. [id number] Score: 4. The patients heart rate in the last 24 hours is as follows:  Pulse  Min: 58  Max: 68     Antiarrhythmics  metoprolol tartrate (LOPRESSOR) tablet 25 mg, 2 times daily, Oral    Anticoagulants  apixaban tablet 2.5 mg, 2 times daily, Oral    Plan  - Replete lytes with a goal of K>4, Mg >2  - Patient is anticoagulated, see medications listed above.  - Patient's afib is currently controlled  - Continue Eliquis and Metoprolol

## 2025-07-31 NOTE — ASSESSMENT & PLAN NOTE
NATHALIE is likely due to pre-renal azotemia due to intravascular volume depletion. Baseline creatinine is around 1.9. Most recent creatinine and eGFR are listed below.  Recent Labs     07/29/25  0218 07/30/25  0629 07/31/25  0450   CREATININE 3.0* 2.6* 2.9*   EGFRNORACEVR 15* 18* 16*      Plan  - NATHALIE is improving  - Avoid nephrotoxins and renally dose meds for GFR listed above  - Monitor urine output, serial BMP, and adjust therapy as needed  - Renal US showed no significant hydronephrosis. 1.3 cm right upper pole renal cyst.  - IVF - change to D5 1/2 NS  - mildly worsened overnight and therefore nephrology consulted this morning.  -recommended continuing IV fluids and repeating BNP in a.m. as patient did vomit twice yesterday.  Per Nephrology this may be her new baseline but she is established with Dr. Goetz and has a follow up appointment on August 13th.

## 2025-07-31 NOTE — PROGRESS NOTES
[facility] Medicine  Progress Note    Patient Name: Violet Swenson  MRN: [id number]  Patient Class: IP- Inpatient   Admission Date: 7/28/2025  Length of Stay: 2 days  Attending Physician: Cliff Berumen MD  Primary Care Provider: Marti Coronel MD        Subjective     Principal Problem:Acute on chronic kidney failure        HPI:  Violet Swenson is a 85 year old female who  has a past medical history of Age-related osteoporosis without current pathological fracture, Anemia, Anxiety, Arthritis, Atrial flutter, Cancer, CHF (congestive heart failure), Chronic anemia, Chronic midline low back pain with right-sided sciatica, Coronary artery disease, Depression, Disorder of kidney and ureter, Encounter for blood transfusion, GERD (gastroesophageal reflux disease), Gout, arthritis, Heart failure, psychiatric care, Hyperlipidemia, Hypertension, Hypothyroidism, Immune deficiency disorder, Kidney disease, Lung nodule, Obesity, Pacemaker, Paroxysmal atrial fibrillation, Pneumonia, Polyneuropathy, Psychiatric problem, Rheumatoid arthritis involving multiple sites with positive rheumatoid factor, Stroke, Tobacco dependence, Trouble in sleeping, and Unstable angina who presented to the Emergency Department for evaluation of abnormal lab values. She had labs drawn on 7/25 for pending angiogram. Creatinine noted to be 4.1. She was called this morning and informed to go to ED for evaluation. She is followed by Dr. Gil outpatient and plans for cardiac cath were in place.    In ED, WBC count 5.98K, Hgb/Hct 11.0/33.3, CO2 11, BUN 83, Creatinine 3.5, Phosphorus 8.9, lactic acid 0.8. UA showed 1+ blood and 3+ leukos. CXR with no acute findings. Renal US showed no significant hydronephrosis. 1.3 cm right upper pole renal cyst. She received  mL bolus and one time dose of Ceftriaxone 1 gm IV. Following a comprehensive evaluation of the patient's clinical presentation, vital  signs, laboratory results, and risk factors, myself with the attending made  the active decision to admit the patient for further monitoring, diagnostic work-up, and initiation of appropriate treatment, given the potential for clinical deterioration if managed in an outpatient setting.     Overview/Hospital Course:  Patient sent from PCP office for NATHALIE.  Renal US showed 1.3 cm right upper pole cyst, but no hydronephrosis.  Renal function improving with IV fluids.  Patient had a fall on 7/29 and stated that she thinks she hit her head on the back.  No LOC, visual disturbance, nausea/vomiting, or HA.  CT head showed no acute findings.    Interval History:  Follow up acute on chronic kidney injury, baseline creatinine 1.7-1.9 but currently 2.9.  She did have a mild improvement until yesterday but it worsened again today.  Nephrology has been consulted.  They recommended continuing doing IV fluids and repeat labs in the morning.  Patient did have 2 bouts of vomiting yesterday but this has been chronic since 1993 after having abdominal surgery.  She states that she intermittently has bouts of vomiting but clears and may not have another episode for 1-2 weeks.  Patient doing well this morning and we will plan for discharge tomorrow if renal function improves.  Patient has had an appointment with Nephrology on September 5th which has been moved to August 13th @ 8:30AM at the [facility].    Review of Systems  Objective:     Vital Signs (Most Recent):  Temp: 97.6 °F (36.4 °C) (07/31/25 1202)  Pulse: 60 (07/31/25 1202)  Resp: 16 (07/31/25 1202)  BP: (!) 141/63 (07/31/25 1202)  SpO2: (!) 93 % (07/31/25 1202) Vital Signs (24h Range):  Temp:  [97.3 °F (36.3 °C)-97.6 °F (36.4 °C)] 97.6 °F (36.4 °C)  Pulse:  [58-68] 60  Resp:  [16-18] 16  SpO2:  [92 %-100 %] 93 %  BP: (102-162)/(57-85) 141/63     Weight: 59.8 kg (131 lb 13.4 oz)  Body mass index is 22.63 kg/m².    Intake/Output Summary (Last 24 hours) at 7/31/2025 1380  Last data filed  at 7/31/2025 0940  Gross per 24 hour   Intake 300 ml   Output --   Net 300 ml         Physical Exam  Vitals and nursing note reviewed.   Constitutional:       Appearance: Normal appearance.   HENT:      Head: Normocephalic and atraumatic.      Nose: Nose normal.      Mouth/Throat:      Mouth: Mucous membranes are moist.   Eyes:      Extraocular Movements: Extraocular movements intact.      Conjunctiva/sclera: Conjunctivae normal.   Cardiovascular:      Rate and Rhythm: Normal rate and regular rhythm.      Pulses: Normal pulses.      Heart sounds: Normal heart sounds.   Pulmonary:      Effort: Pulmonary effort is normal.      Breath sounds: Normal breath sounds.   Abdominal:      General: Abdomen is flat. Bowel sounds are normal.      Palpations: Abdomen is soft.   Musculoskeletal:         General: Normal range of motion.      Cervical back: Normal range of motion and neck supple.   Skin:     General: Skin is warm.      Capillary Refill: Capillary refill takes less than 2 seconds.   Neurological:      Mental Status: She is alert and oriented to person, place, and time. Mental status is at baseline.   Psychiatric:         Mood and Affect: Mood normal.         Behavior: Behavior normal.               Significant Labs: All pertinent labs within the past 24 hours have been reviewed.  Recent Lab Results         07/31/25  0450        Anion Gap 10  Comment: UNABLE TO CALCULATE       BUN 51       Calcium 7.5       Chloride 122       CO2 11       Creatinine 2.9       eGFR 16  Comment: Estimated GFR calculated using the CKD-EPI creatinine (2021) equation.       Glucose 94       Phosphorus Level 5.3       Potassium 4.7       Sodium 143               Significant Imaging: I have reviewed all pertinent imaging results/findings within the past 24 hours.      Assessment & Plan  Acute on chronic kidney failure  NATHALIE is likely due to pre-renal azotemia due to intravascular volume depletion. Baseline creatinine is around 1.9. Most  recent creatinine and eGFR are listed below.  Recent Labs     07/29/25  0218 07/30/25  0629 07/31/25  0450   CREATININE 3.0* 2.6* 2.9*   EGFRNORACEVR 15* 18* 16*      Plan  - NATHALIE is improving  - Avoid nephrotoxins and renally dose meds for GFR listed above  - Monitor urine output, serial BMP, and adjust therapy as needed  - Renal US showed no significant hydronephrosis. 1.3 cm right upper pole renal cyst.  - IVF - change to D5 1/2 NS  - mildly worsened overnight and therefore nephrology consulted this morning.  -recommended continuing IV fluids and repeating BNP in a.m. as patient did vomit twice yesterday.  Per Nephrology this may be her new baseline but she is established with Dr. Goetz and has a follow up appointment on August 13th.  Essential hypertension  Patient's blood pressure range in the last 24 hours was: BP  Min: 102/57  Max: 162/83.The patient's inpatient anti-hypertensive regimen is listed below:  Current Antihypertensives  metoprolol tartrate (LOPRESSOR) tablet 25 mg, 2 times daily, Oral  hydrALAZINE injection 10 mg, Every 6 hours PRN, Intravenous    Plan  - BP is controlled, no changes needed to their regimen  - Hold Olmesartan and Furosemide due to NATHALIE  Mixed hyperlipidemia  Lab Results   Component Value Date    CHOL 91 (L) 07/25/2025    HDL 47 07/25/2025    LDLCALC 21.2 (L) 07/25/2025    TRIG 114 07/25/2025     -continue statin therapy  Hypothyroidism (acquired)  Lab Results   Component Value Date    TSH 3.585 07/25/2025    TSH 6.633 (H) 01/25/2025    TSH 5.875 (H) 06/19/2024      -Continue Levothyroxine  Permanent atrial fibrillation  Patient has permanent atrial fibrillation. Patient is currently in atrial fibrillation. [id number] Score: 4. The patients heart rate in the last 24 hours is as follows:  Pulse  Min: 58  Max: 68     Antiarrhythmics  metoprolol tartrate (LOPRESSOR) tablet 25 mg, 2 times daily, Oral    Anticoagulants  apixaban tablet 2.5 mg, 2 times daily, Oral    Plan  - Replete  "lytes with a goal of K>4, Mg >2  - Patient is anticoagulated, see medications listed above.  - Patient's afib is currently controlled  - Continue Eliquis and Metoprolol       Chronic diastolic heart failure  Patient has Diastolic (HFpEF) heart failure that is Chronic. On presentation their CHF was well compensated. Most recent BNP and echo results are listed below.  No results for input(s): "BNP" in the last 72 hours.  Latest ECHO  Results for orders placed during the hospital encounter of 05/19/25    Echo    Interpretation Summary    Left Ventricle: The left ventricle is normal in size. Mildly increased ventricular mass. Mildly increased wall thickness. There is mild concentric hypertrophy. There is low normal systolic function with a visually estimated ejection fraction of 50 - 55%. Grade III diastolic dysfunction. Elevated left ventricular filling pressure.    Right Ventricle: The right ventricle is normal in size Wall thickness is normal. Systolic function is borderline low. Pacemaker lead present in the ventricle.    Left Atrium: The left atrium is mildly dilated    Right Atrium: The right atrium is mildly dilated .    Aortic Valve: There is moderate aortic valve sclerosis. Moderately restricted motion. There is moderate to severe stenosis. Aortic valve area by VTI is 0.8 cm². Aortic valve peak velocity is 2.4 m/s. Mean gradient is 12 mmHg. The dimensionless index is 0.26.    Mitral Valve: Mildly calcified leaflets. There is moderate mitral annular calcification. There is mild regurgitation.    Tricuspid Valve: There is mild regurgitation.    Pulmonary Artery: There is mild pulmonary hypertension. The estimated pulmonary artery systolic pressure is 44 mmHg.    IVC/SVC: Normal venous pressure at 3 mmHg.    Current Heart Failure Medications  hydrALAZINE injection 10 mg, Every 6 hours PRN, Intravenous    Plan  - Monitor strict I&Os and daily weights.    - Place on telemetry  - Low sodium diet  - Cardiology has not " been consulted           Acute cystitis without hematuria  -UA with 3+ leukos  -No urine cx reflexed  -Continue Ceftriaxone 1 gm IV daily (D3/3) - complete    Hyperphosphatemia  Patient's most recent phosphorus results are listed below.   Recent Labs     07/29/25  0147 07/31/25  0450   PHOS 8.0* 5.3*     Plan  - Likely due to NATHALIE  - Continue IVF  - Monitor  - Improving  - repeat pending  Accident due to mechanical fall without injury  Patient has issues with falls at home.  PT/OT recommended home with .  Consult for CM placed.  Rolling walker ordered per patient request  VTE Risk Mitigation (From admission, onward)           Ordered     apixaban tablet 2.5 mg  2 times daily         07/28/25 1315     Reason for no Mechanical VTE Prophylaxis  Once        Comments: Currently on Eliquis for PAF   Question:  Reasons:  Answer:  Physician Provided (leave comment)    07/28/25 1315     IP VTE HIGH RISK PATIENT  Once         07/28/25 1315     Place sequential compression device  Until discontinued         07/28/25 1315                    Discharge Planning   LADARIUS: 7/31/2025     Code Status: Full Code   Medical Readiness for Discharge Date: 7/31/2025  Discharge Plan A: Home Health, Home   Discharge Delays: None known at this time              Please place Justification for DME      Cliff Berumen MD  Department of Hospital Medicine   [facility] - Tele[facility] ([facility])

## 2025-08-01 VITALS
BODY MASS INDEX: 24.46 KG/M2 | DIASTOLIC BLOOD PRESSURE: 80 MMHG | TEMPERATURE: 98 F | SYSTOLIC BLOOD PRESSURE: 162 MMHG | OXYGEN SATURATION: 98 % | HEART RATE: 59 BPM | WEIGHT: 143.31 LBS | RESPIRATION RATE: 17 BRPM | HEIGHT: 64 IN

## 2025-08-01 PROBLEM — K59.1 FUNCTIONAL DIARRHEA: Status: ACTIVE | Noted: 2025-08-01

## 2025-08-01 LAB
ABSOLUTE EOSINOPHIL (OHS): 0.18 K/UL
ABSOLUTE MONOCYTE (OHS): 0.51 K/UL (ref 0.3–1)
ABSOLUTE NEUTROPHIL COUNT (OHS): 2.96 K/UL (ref 1.8–7.7)
ALBUMIN SERPL BCP-MCNC: 3.2 G/DL (ref 3.5–5.2)
ANION GAP (OHS): 5 MMOL/L (ref 8–16)
ANION GAP (OHS): 8 MMOL/L (ref 8–16)
BASOPHILS # BLD AUTO: 0.02 K/UL
BASOPHILS NFR BLD AUTO: 0.3 %
BUN SERPL-MCNC: 36 MG/DL (ref 8–23)
BUN SERPL-MCNC: 40 MG/DL (ref 8–23)
CALCIUM SERPL-MCNC: 7.3 MG/DL (ref 8.7–10.5)
CALCIUM SERPL-MCNC: 7.4 MG/DL (ref 8.7–10.5)
CHLORIDE SERPL-SCNC: 120 MMOL/L (ref 95–110)
CHLORIDE SERPL-SCNC: 120 MMOL/L (ref 95–110)
CO2 SERPL-SCNC: 12 MMOL/L (ref 23–29)
CO2 SERPL-SCNC: 13 MMOL/L (ref 23–29)
CREAT SERPL-MCNC: 2.2 MG/DL (ref 0.5–1.4)
CREAT SERPL-MCNC: 2.5 MG/DL (ref 0.5–1.4)
ERYTHROCYTE [DISTWIDTH] IN BLOOD BY AUTOMATED COUNT: 17.1 % (ref 11.5–14.5)
GFR SERPLBLD CREATININE-BSD FMLA CKD-EPI: 19 ML/MIN/1.73/M2
GFR SERPLBLD CREATININE-BSD FMLA CKD-EPI: 22 ML/MIN/1.73/M2
GLUCOSE SERPL-MCNC: 67 MG/DL (ref 70–110)
GLUCOSE SERPL-MCNC: 73 MG/DL (ref 70–110)
HCT VFR BLD AUTO: 28.8 % (ref 37–48.5)
HGB BLD-MCNC: 9.1 GM/DL (ref 12–16)
IMM GRANULOCYTES # BLD AUTO: 0.03 K/UL (ref 0–0.04)
IMM GRANULOCYTES NFR BLD AUTO: 0.4 % (ref 0–0.5)
LYMPHOCYTES # BLD AUTO: 3.33 K/UL (ref 1–4.8)
MCH RBC QN AUTO: 33.8 PG (ref 27–31)
MCHC RBC AUTO-ENTMCNC: 31.6 G/DL (ref 32–36)
MCV RBC AUTO: 107 FL (ref 82–98)
NUCLEATED RBC (/100WBC) (OHS): 0 /100 WBC
PHOSPHATE SERPL-MCNC: 4.1 MG/DL (ref 2.7–4.5)
PLATELET # BLD AUTO: 132 K/UL (ref 150–450)
PMV BLD AUTO: 10.7 FL (ref 9.2–12.9)
POTASSIUM SERPL-SCNC: 5.6 MMOL/L (ref 3.5–5.1)
POTASSIUM SERPL-SCNC: 6 MMOL/L (ref 3.5–5.1)
RBC # BLD AUTO: 2.69 M/UL (ref 4–5.4)
RELATIVE EOSINOPHIL (OHS): 2.6 %
RELATIVE LYMPHOCYTE (OHS): 47.4 % (ref 18–48)
RELATIVE MONOCYTE (OHS): 7.3 % (ref 4–15)
RELATIVE NEUTROPHIL (OHS): 42 % (ref 38–73)
SODIUM SERPL-SCNC: 138 MMOL/L (ref 136–145)
SODIUM SERPL-SCNC: 140 MMOL/L (ref 136–145)
WBC # BLD AUTO: 7.03 K/UL (ref 3.9–12.7)

## 2025-08-01 PROCEDURE — 99233 SBSQ HOSP IP/OBS HIGH 50: CPT | Mod: HCNC,,, | Performed by: INTERNAL MEDICINE

## 2025-08-01 PROCEDURE — 82310 ASSAY OF CALCIUM: CPT | Mod: HCNC | Performed by: FAMILY MEDICINE

## 2025-08-01 PROCEDURE — 25000003 PHARM REV CODE 250: Mod: HCNC | Performed by: FAMILY MEDICINE

## 2025-08-01 PROCEDURE — 36415 COLL VENOUS BLD VENIPUNCTURE: CPT | Mod: HCNC | Performed by: FAMILY MEDICINE

## 2025-08-01 PROCEDURE — 25000003 PHARM REV CODE 250: Mod: HCNC | Performed by: NURSE PRACTITIONER

## 2025-08-01 PROCEDURE — S5010 5% DEXTROSE AND 0.45% SALINE: HCPCS | Mod: HCNC | Performed by: FAMILY MEDICINE

## 2025-08-01 PROCEDURE — 97530 THERAPEUTIC ACTIVITIES: CPT | Mod: HCNC

## 2025-08-01 PROCEDURE — 97116 GAIT TRAINING THERAPY: CPT | Mod: HCNC

## 2025-08-01 PROCEDURE — 85025 COMPLETE CBC W/AUTO DIFF WBC: CPT | Mod: HCNC | Performed by: FAMILY MEDICINE

## 2025-08-01 PROCEDURE — 80069 RENAL FUNCTION PANEL: CPT | Mod: HCNC | Performed by: INTERNAL MEDICINE

## 2025-08-01 RX ADMIN — METOPROLOL TARTRATE 25 MG: 25 TABLET, FILM COATED ORAL at 09:08

## 2025-08-01 RX ADMIN — CALCITRIOL CAPSULES 0.25 MCG 0.25 MCG: 0.25 CAPSULE ORAL at 09:08

## 2025-08-01 RX ADMIN — LEVOTHYROXINE SODIUM 112 MCG: 0.11 TABLET ORAL at 05:08

## 2025-08-01 RX ADMIN — PRAVASTATIN SODIUM 40 MG: 20 TABLET ORAL at 09:08

## 2025-08-01 RX ADMIN — SODIUM BICARBONATE 650 MG: 650 TABLET ORAL at 09:08

## 2025-08-01 RX ADMIN — DEXTROSE AND SODIUM CHLORIDE: 5; 450 INJECTION, SOLUTION INTRAVENOUS at 12:08

## 2025-08-01 RX ADMIN — CLOPIDOGREL 75 MG: 75 TABLET ORAL at 09:08

## 2025-08-01 RX ADMIN — APIXABAN 2.5 MG: 2.5 TABLET, FILM COATED ORAL at 09:08

## 2025-08-01 RX ADMIN — SERTRALINE HYDROCHLORIDE 150 MG: 50 TABLET ORAL at 09:08

## 2025-08-01 NOTE — ASSESSMENT & PLAN NOTE
Pt has NATHALIE on CKD stage 3.  NATHALIE is due to GI losses. Prerenal azotemia  NATHALIE is resolving  Renal function is stable.  K normal to borderline high  Metabolic acidosis  Na normal

## 2025-08-01 NOTE — PLAN OF CARE
TX COMPLETED: facilitated bed mobility with SPV, transfers with SBA, ambulated 100 ft x 2 SBA with RW. Recommend continued PT services in order to progress toward baseline.

## 2025-08-01 NOTE — ASSESSMENT & PLAN NOTE
NATHALIE is likely due to pre-renal azotemia due to intravascular volume depletion. Baseline creatinine is around 1.9. Most recent creatinine and eGFR are listed below.  Recent Labs     07/31/25  0450 08/01/25  0426 08/01/25  1455   CREATININE 2.9* 2.5* 2.2*   EGFRNORACEVR 16* 19* 22*      Plan  - NATHALIE is improving  - Avoid nephrotoxins and renally dose meds for GFR listed above  - Monitor urine output, serial BMP, and adjust therapy as needed  - Renal US showed no significant hydronephrosis. 1.3 cm right upper pole renal cyst.  - IVF - change to D5 1/2 NS  - mildly worsened overnight and therefore nephrology consulted this morning.  -recommended continuing IV fluids and repeating BNP in a.m. as patient did vomit twice yesterday.  Per Nephrology this may be her new baseline but she is established with Dr. Goetz and has a follow up appointment on August 13th.

## 2025-08-01 NOTE — PLAN OF CARE
[facility] Tele[facility] (Hospital)  Discharge Final Note    Primary Care Provider: Marti Coronel MD    Expected Discharge Date: 8/1/2025    Final Discharge Note (most recent)       Final Note - 08/01/25 0954          Final Note    Assessment Type Final Discharge Note     Anticipated Discharge Disposition [place] [facility]     Hospital Resources/Appts/Education Provided Post-Acute resouces added to AVS;Appointments scheduled and added to AVS        Post-Acute Status    Post-Acute Authorization Home Health;HME     HME Status Set-up Complete/Auth obtained     Home Health Status Set-up Complete/Auth obtained     Discharge Delays None known at this time                   DC home today.  [facility] accepting; notified of DC today. [facility] delivered RW to bedside yesterday. AVS updated.  PCP follow up scheduled on AVS:   Established Patient Visit with Gali Brown PA-C  Wednesday Aug 6, 2025 11:00 AM    Important Message from Medicare              Follow-up providers       Ander Goetz MD   Specialty: Nephrology   Relationship: Consulting Physician    [address] [place] [address] NEPHROLOGY  [facility] [address]   Phone: [phone]       Next Steps: Follow up on 8/13/2025    Instructions: 8:30am @ [facility]              After-discharge care                Durable Medical Equipment       [facility]   Service: Durable Medical Equipment    [address] [place] [id number]   Phone: [phone]                 Home Medical Care       *[facility]   Service: Home Health Services    [address] [facility] [address]   Phone: [phone]

## 2025-08-01 NOTE — ASSESSMENT & PLAN NOTE
Patient's blood pressure range in the last 24 hours was: BP  Min: 128/62  Max: 144/70.The patient's inpatient anti-hypertensive regimen is listed below:  Current Antihypertensives  metoprolol tartrate (LOPRESSOR) tablet 25 mg, 2 times daily, Oral  hydrALAZINE injection 10 mg, Every 6 hours PRN, Intravenous    Plan  - BP is controlled, no changes needed to their regimen  - Hold Olmesartan and Furosemide due to NATHALIE

## 2025-08-01 NOTE — PLAN OF CARE
Pt is alert and oriented x 4 with son at the bedside. AVS given to pt. Pt verbalized an understanding of discharge plan of care with teaching via virtual nurse.  Problem: Hospitalized Older Adult  Goal: Optimal Cognitive Function  Outcome: Met  Goal: Effective Bowel Elimination  Outcome: Met  Goal: Optimal Coping  Outcome: Met  Goal: Fluid and Electrolyte Balance  Outcome: Met  Goal: Optimal Functional Ability  Outcome: Met  Goal: Improved Oral Intake  Outcome: Met  Goal: Adequate Sleep/Rest  Outcome: Met  Goal: Effective Urinary Elimination  Outcome: Met     Problem: Adult Inpatient Plan of Care  Goal: Plan of Care Review  Outcome: Met  Goal: Patient-Specific Goal (Individualized)  Outcome: Met  Goal: Absence of Hospital-Acquired Illness or Injury  Outcome: Met  Goal: Optimal Comfort and Wellbeing  Outcome: Met  Goal: Readiness for Transition of Care  Outcome: Met     Problem: Acute Kidney Injury/Impairment  Goal: Fluid and Electrolyte Balance  Outcome: Met  Goal: Improved Oral Intake  Outcome: Met  Goal: Effective Renal Function  Outcome: Met     Problem: Fall Injury Risk  Goal: Absence of Fall and Fall-Related Injury  Outcome: Met

## 2025-08-01 NOTE — ASSESSMENT & PLAN NOTE
Patient's most recent phosphorus results are listed below.   Recent Labs     07/31/25  0450 08/01/25  0426   PHOS 5.3* 4.1     Plan  - Likely due to NATHALIE  -resolved

## 2025-08-01 NOTE — PROGRESS NOTES
AVS virtually reviewed with patient in its entirety with emphasis on diagnosis, diet and lifestyle modifications, medications, follow-up appointments and reasons to return to the ED. Patient also encouraged to utilize their patient portal. Ease and convenience of use reiterated. Education complete and patient voiced understanding with teach back method. All questions answered. Discharge teaching complete. Care Plan and Education templates resolved.

## 2025-08-01 NOTE — SUBJECTIVE & OBJECTIVE
"Interval History: Pt was seen and examined. Labs and meds reviewed. Discussed with other providers.   Pt is a 84 y/o female with h/o of long-standing, chronic and intermittent diarrhea and nausea with occasional vomiting who was admitted for abnormal labs. S Cr was > 4. On IVF's s Cr has improved (2.2) and today pt is being d/ramon home. Pt has no c/o's, no current GI issues today. Pt says the GI issues are "old." No new c/o's, no new events.    Review of patient's allergies indicates:   Allergen Reactions    Corticosteroids (glucocorticoids) Nausea Only and Other (See Comments)     Stomach pain, dizziness, headache    Oxycodone Other (See Comments)     Blood pressure dropped     Current Facility-Administered Medications   Medication Frequency    acetaminophen tablet 650 mg Q8H PRN    apixaban tablet 2.5 mg BID    calcitRIOL capsule 0.25 mcg Every Mon, Wed, Fri    clopidogreL tablet 75 mg Daily    dextrose 5 % and 0.45 % NaCl infusion Continuous    hydrALAZINE injection 10 mg Q6H PRN    hydroxychloroquine tablet 200 mg Every other day    levothyroxine tablet 112 mcg Before breakfast    metoprolol tartrate (LOPRESSOR) tablet 25 mg BID    ondansetron injection 4 mg Q8H PRN    pravastatin tablet 40 mg Daily    prochlorperazine injection Soln 5 mg Q6H PRN    sertraline tablet 150 mg Daily    sodium bicarbonate tablet 650 mg BID    sodium chloride 0.9% flush 10 mL PRN    traMADoL tablet 50 mg Q12H PRN       Objective:     Vital Signs (Most Recent):  Temp: 97.8 °F (36.6 °C) (08/01/25 1225)  Pulse: 78 (08/01/25 1225)  Resp: 16 (08/01/25 1225)  BP: 136/67 (08/01/25 1225)  SpO2: 98 % (08/01/25 1225) Vital Signs (24h Range):  Temp:  [97.3 °F (36.3 °C)-98.8 °F (37.1 °C)] 97.8 °F (36.6 °C)  Pulse:  [] 78  Resp:  [16-18] 16  SpO2:  [87 %-100 %] 98 %  BP: (128-144)/(62-73) 136/67     Weight: 65 kg (143 lb 4.8 oz) (08/01/25 0430)  Body mass index is 24.6 kg/m².  Body surface area is 1.71 meters squared.    I/O last 3 completed " shifts:  In: 4359.6 [P.O.:1020; I.V.:3339.6]  Out: -      Physical Exam  Vitals and nursing note reviewed.   Constitutional:       Appearance: Normal appearance.   Cardiovascular:      Rate and Rhythm: Normal rate and regular rhythm.      Pulses: Normal pulses.      Heart sounds: Normal heart sounds.   Pulmonary:      Effort: Pulmonary effort is normal.      Breath sounds: Normal breath sounds.   Abdominal:      General: Abdomen is flat.      Tenderness: There is no abdominal tenderness.   Musculoskeletal:      Right lower leg: No edema.      Left lower leg: No edema.   Neurological:      Mental Status: She is alert and oriented to person, place, and time.   Psychiatric:         Behavior: Behavior normal.          Significant Labs: reviewed  BMP  Lab Results   Component Value Date     08/01/2025    K 5.6 (H) 08/01/2025     (H) 08/01/2025    CO2 13 (L) 08/01/2025    BUN 36 (H) 08/01/2025    CREATININE 2.2 (H) 08/01/2025    CALCIUM 7.4 (L) 08/01/2025    ANIONGAP 5 (L) 08/01/2025    EGFRNORACEVR 22 (L) 08/01/2025     Lab Results   Component Value Date    WBC 7.03 08/01/2025    HGB 9.1 (L) 08/01/2025    HCT 28.8 (L) 08/01/2025     (H) 08/01/2025     (L) 08/01/2025         Significant Imaging: reviewed

## 2025-08-01 NOTE — DISCHARGE SUMMARY
[facility] Medicine  Discharge Summary      Patient Name: Violet Swenson  MRN: [id number]  [facility]: [id number]  Patient Class: IP- Inpatient  Admission Date: 7/28/2025  Hospital Length of Stay: 3 days  Discharge Date and Time: No discharge date for patient encounter.  Attending Physician: Cliff Berumen MD   Discharging Provider: Cliff Berumen MD  Primary Care Provider: Marti Coronel MD    Primary Care Team: Networked reference to record PCT     HPI:   Violet Swenson is a 85 year old female who  has a past medical history of Age-related osteoporosis without current pathological fracture, Anemia, Anxiety, Arthritis, Atrial flutter, Cancer, CHF (congestive heart failure), Chronic anemia, Chronic midline low back pain with right-sided sciatica, Coronary artery disease, Depression, Disorder of kidney and ureter, Encounter for blood transfusion, GERD (gastroesophageal reflux disease), Gout, arthritis, Heart failure, psychiatric care, Hyperlipidemia, Hypertension, Hypothyroidism, Immune deficiency disorder, Kidney disease, Lung nodule, Obesity, Pacemaker, Paroxysmal atrial fibrillation, Pneumonia, Polyneuropathy, Psychiatric problem, Rheumatoid arthritis involving multiple sites with positive rheumatoid factor, Stroke, Tobacco dependence, Trouble in sleeping, and Unstable angina who presented to the Emergency Department for evaluation of abnormal lab values. She had labs drawn on 7/25 for pending angiogram. Creatinine noted to be 4.1. She was called this morning and informed to go to ED for evaluation. She is followed by Dr. Gil outpatient and plans for cardiac cath were in place.    In ED, WBC count 5.98K, Hgb/Hct 11.0/33.3, CO2 11, BUN 83, Creatinine 3.5, Phosphorus 8.9, lactic acid 0.8. UA showed 1+ blood and 3+ leukos. CXR with no acute findings. Renal US showed no significant hydronephrosis. 1.3 cm right upper pole renal cyst. She received  mL bolus and  one time dose of Ceftriaxone 1 gm IV. Following a comprehensive evaluation of the patient's clinical presentation, vital signs, laboratory results, and risk factors, myself with the attending made  the active decision to admit the patient for further monitoring, diagnostic work-up, and initiation of appropriate treatment, given the potential for clinical deterioration if managed in an outpatient setting.     * No surgery found *      Hospital Course:   Patient sent from PCP office for NATHALIE.  Renal US showed 1.3 cm right upper pole cyst, but no hydronephrosis.  Renal function improving with IV fluids.  Patient had a fall on 7/29 and stated that she thinks she hit her head on the back.  No LOC, visual disturbance, nausea/vomiting, or HA.  CT head showed no acute findings.  Patient renal function continues to improve and she was seen by Nephrology who recommended continuing fluids overnight to see if the renal function begins to improve again.  Creatinine on day of discharge is 2.2 with a baseline of 1.7-1.9.  Patient already has an established appointment with Dr. Goetz on 08/13/2025 and will be having repeat blood work done on 08/04/2025 with [facility] to be sent to Dr. Goetz's office.  Of note, patient's potassium was elevated but that was likely due to replacement given yesterday from her hypokalemia.  This is already started to improve with repeat lab work done at 2:00 p.m..  Potassium at this time is 5.6 and patient has remained on telemetry monitor without any abnormality.  This will be sent to Dr. Goetz as well on Monday with likely improvement as she will not be on any supplemental potassium upon discharge.  She will also need to follow up with the primary care doctor for hospital follow up.  Patient has no additional complaints and we will be discharged this afternoon in stable condition.     Goals of Care Treatment Preferences:  Code Status: Full Code    Living Will: Yes                 Consults:    Consults (From admission, onward)          Status Ordering Provider     Inpatient consult to Nephrology  Once        Provider:  Williams Ahuja MD    Completed DARIUS CLARKE            Assessment & Plan  Acute on chronic kidney failure  NATHALIE is likely due to pre-renal azotemia due to intravascular volume depletion. Baseline creatinine is around 1.9. Most recent creatinine and eGFR are listed below.  Recent Labs     07/31/25  0450 08/01/25  0426 08/01/25  1455   CREATININE 2.9* 2.5* 2.2*   EGFRNORACEVR 16* 19* 22*      Plan  - NATHALIE is improving  - Avoid nephrotoxins and renally dose meds for GFR listed above  - Monitor urine output, serial BMP, and adjust therapy as needed  - Renal US showed no significant hydronephrosis. 1.3 cm right upper pole renal cyst.  - IVF - change to D5 1/2 NS  - mildly worsened overnight and therefore nephrology consulted this morning.  -recommended continuing IV fluids and repeating BNP in a.m. as patient did vomit twice yesterday.  Per Nephrology this may be her new baseline but she is established with Dr. Goetz and has a follow up appointment on August 13th.  Essential hypertension  Patient's blood pressure range in the last 24 hours was: BP  Min: 128/62  Max: 144/70.The patient's inpatient anti-hypertensive regimen is listed below:  Current Antihypertensives  metoprolol tartrate (LOPRESSOR) tablet 25 mg, 2 times daily, Oral  hydrALAZINE injection 10 mg, Every 6 hours PRN, Intravenous    Plan  - BP is controlled, no changes needed to their regimen  - Hold Olmesartan and Furosemide due to NATHALIE  Mixed hyperlipidemia  Lab Results   Component Value Date    CHOL 91 (L) 07/25/2025    HDL 47 07/25/2025    LDLCALC 21.2 (L) 07/25/2025    TRIG 114 07/25/2025     -continue statin therapy  Hypothyroidism (acquired)  Lab Results   Component Value Date    TSH 3.585 07/25/2025    TSH 6.633 (H) 01/25/2025    TSH 5.875 (H) 06/19/2024      -Continue Levothyroxine  Permanent atrial  "fibrillation  Patient has permanent atrial fibrillation. Patient is currently in atrial fibrillation. [id number] Score: 4. The patients heart rate in the last 24 hours is as follows:  Pulse  Min: 59  Max: 107     Antiarrhythmics  metoprolol tartrate (LOPRESSOR) tablet 25 mg, 2 times daily, Oral    Anticoagulants  apixaban tablet 2.5 mg, 2 times daily, Oral    Plan  - Replete lytes with a goal of K>4, Mg >2  - Patient is anticoagulated, see medications listed above.  - Patient's afib is currently controlled  - Continue Eliquis and Metoprolol       Chronic diastolic heart failure  Patient has Diastolic (HFpEF) heart failure that is Chronic. On presentation their CHF was well compensated. Most recent BNP and echo results are listed below.  No results for input(s): "BNP" in the last 72 hours.  Latest ECHO  Results for orders placed during the hospital encounter of 05/19/25    Echo    Interpretation Summary    Left Ventricle: The left ventricle is normal in size. Mildly increased ventricular mass. Mildly increased wall thickness. There is mild concentric hypertrophy. There is low normal systolic function with a visually estimated ejection fraction of 50 - 55%. Grade III diastolic dysfunction. Elevated left ventricular filling pressure.    Right Ventricle: The right ventricle is normal in size Wall thickness is normal. Systolic function is borderline low. Pacemaker lead present in the ventricle.    Left Atrium: The left atrium is mildly dilated    Right Atrium: The right atrium is mildly dilated .    Aortic Valve: There is moderate aortic valve sclerosis. Moderately restricted motion. There is moderate to severe stenosis. Aortic valve area by VTI is 0.8 cm². Aortic valve peak velocity is 2.4 m/s. Mean gradient is 12 mmHg. The dimensionless index is 0.26.    Mitral Valve: Mildly calcified leaflets. There is moderate mitral annular calcification. There is mild regurgitation.    Tricuspid Valve: There is mild " regurgitation.    Pulmonary Artery: There is mild pulmonary hypertension. The estimated pulmonary artery systolic pressure is 44 mmHg.    IVC/SVC: Normal venous pressure at 3 mmHg.    Current Heart Failure Medications  hydrALAZINE injection 10 mg, Every 6 hours PRN, Intravenous    Plan  - Monitor strict I&Os and daily weights.    - Place on telemetry  - Low sodium diet  - Cardiology has not been consulted           Acute cystitis without hematuria  -UA with 3+ leukos  -No urine cx reflexed  -Continue Ceftriaxone 1 gm IV daily (D3/3) - complete    Hyperphosphatemia  Patient's most recent phosphorus results are listed below.   Recent Labs     07/31/25  0450 08/01/25  0426   PHOS 5.3* 4.1     Plan  - Likely due to NATHALIE  -resolved  Accident due to mechanical fall without injury  Patient has issues with falls at home.  PT/OT recommended home with .  Consult for CM placed.  Rolling walker ordered per patient request  Final Active Diagnoses:    Diagnosis Date Noted POA    PRINCIPAL PROBLEM:  Acute on chronic kidney failure [N17.9, N18.9] 07/28/2025 Yes    Accident due to mechanical fall without injury [W19.XXXA] 07/29/2025 Yes    Acute cystitis without hematuria [N30.00] 07/28/2025 Yes    Hyperphosphatemia [E83.39] 07/28/2025 Yes    Chronic diastolic heart failure [I50.32] 11/16/2020 Yes    Permanent atrial fibrillation [I48.21] 03/15/2018 Yes    Essential hypertension [I10] 02/01/2016 Yes     Chronic    Mixed hyperlipidemia [E78.2] 02/01/2016 Yes     Chronic    Hypothyroidism (acquired) [E03.9] 02/01/2016 Yes     Chronic      Problems Resolved During this Admission:       Discharged Condition: stable    Disposition: Home-Health Care [facility]    Follow Up:   Contact information for follow-up providers       Ander Goetz MD Follow up on 8/13/2025.    Specialty: Nephrology  Why: 8:30am @ [facility]  Contact information:  [address] NEPHROLOGY  [facility] [id number] [phone]               Homer  "Marti SHELTON MD Follow up in 1 week(s).    Specialty: Family Medicine  Contact information:  [facility] [address] [place] [id number] [phone]                       Contact information for after-discharge care       Durable Medical Equipment       [facility] .    Service: Durable Medical Equipment  Contact information:  [address] [place] [id number]  [phone]                     Home Medical Care       [facility] .    Service: Home Health Services  Contact information:  [address] [place] [address] [address]  [facility] [id number] [phone]                                 Patient Instructions:      WALKER FOR HOME USE     Order Specific Question Answer Comments   Type of Walker: Adult (5'4"-6'6")    With wheels? Yes    Height: 5' 4" (1.626 m)    Weight: 59.8 kg (131 lb 13.4 oz)    Length of need (1-99 months): 3    Does patient have medical equipment at home? cane, straight    Does patient have medical equipment at home? rollator    Please check all that apply: Patient is unable to safely ambulate without equipment.      Ambulatory referral/consult to Outpatient Case Management   Referral Priority: Routine Referral Type: Consultation   Referral Reason: Specialty Services Required   Number of Visits Requested: 1     Ambulatory referral/consult to Home Health   Referral Priority: Routine Referral Type: Home Health   Referral Reason: Specialty Services Required   Requested Specialty: Home Health Services   Number of Visits Requested: 1     ACCEPT - Ambulatory referral/consult to [facility]   Standing Status: Future   Referral Priority: Routine Referral Type: Consultation   Referral Reason: Specialty Services Required   Requested Specialty: Cardiology   Number of Visits Requested: 1     Diet renal     Activity as tolerated       Significant Diagnostic Studies: Labs: BMP:   Recent Labs   Lab 07/31/25  0450 " 08/01/25  0426 08/01/25  1455   GLU 94 67* 73    140 138   K 4.7 6.0* 5.6*   * 120* 120*   CO2 11* 12* 13*   BUN 51* 40* 36*   CREATININE 2.9* 2.5* 2.2*   CALCIUM 7.5* 7.3* 7.4*    and CBC   Recent Labs   Lab 08/01/25 0426   WBC 7.03   HGB 9.1*   HCT 28.8*   *     Radiology:   CT Head Without Contrast   Final Result    No acute findings.      Finalized on: 7/29/2025 10:00 AM By:  Ander Moctezuma MD   [facility]# [id number]      2025-07-29 10:02:17.868     [facility]   Final Result      No significant hydronephrosis.  1.3 cm right upper pole renal cyst.         Electronically signed by: Sulema Ochoa MD   Date:    07/28/2025   Time:    12:13      X-Ray Chest AP Portable   Final Result      1.  Negative for acute process involving the chest.      2.  Stable findings as noted above.         Electronically signed by: Vinnie Amezcua MD   Date:    07/28/2025   Time:    11:00           Pending Diagnostic Studies:       None           Medications:  Reconciled Home Medications:      Medication List        PAUSE taking these medications      furosemide 40 MG tablet  Wait to take this until your doctor or other care provider tells you to start again.  Resume after clearance by cardiology  Commonly known as: LASIX  TAKE ONE TABLET (40MG) BY MOUTH EVERY DAY; THEN TAKE TWO TABLETS (80MG TOTAL) EVERY OTHER DAY     olmesartan 20 MG tablet  Wait to take this until your doctor or other care provider tells you to start again.  Resume after clearance by cardiology  Commonly known as: BENICAR  Take 1 tablet (20 mg total) by mouth once daily.            CONTINUE taking these medications      allopurinoL 300 MG tablet  Commonly known as: ZYLOPRIM  Take 1 tablet (300 mg total) by mouth once daily.     ascorbic acid (vitamin C) 100 MG tablet  Commonly known as: VITAMIN C  Take by mouth once daily.     azelastine 137 mcg (0.1 %) nasal spray  Commonly known as: ASTELIN  1 spray (137 mcg total) by Nasal route 2 (two)  times daily.     busPIRone 7.5 MG tablet  Commonly known as: BUSPAR  Take 1 tablet (7.5 mg total) by mouth 2 (two) times daily.     calcitRIOL 0.25 MCG Cap  Commonly known as: ROCALTROL  TAKE ONE CAPSULE BY MOUTH EVERY MONDAY, WEDNESDAY, AND FRIDAY     clopidogreL 75 mg tablet  Commonly known as: PLAVIX  TAKE ONE TABLET BY MOUTH EVERY DAY     diclofenac sodium 1 % Gel  Commonly known as: VOLTAREN  Apply 2 g topically once daily. Apply 2 g over painful joints once or twice a day.     DIPRIVAN 10 mg/mL infusion  Generic drug: propofoL     DULoxetine 30 MG capsule  Commonly known as: CYMBALTA  TAKE ONE CAPSULE BY MOUTH EVERY DAY     ELIQUIS 2.5 mg Tab  Generic drug: apixaban  TAKE ONE TABLET BY MOUTH TWICE DAILY     ergocalciferol 50,000 unit Cap  Commonly known as: ERGOCALCIFEROL  TAKE ONE CAPSULE BY MOUTH EVERY 7 DAYS     fluticasone propionate 50 mcg/actuation nasal spray  Commonly known as: FLONASE  2 sprays (100 mcg total) by Each Nostril route once daily.     hydroxychloroquine 200 mg tablet  Commonly known as: PLAQUENIL  Take 1 tablet (200 mg total) by mouth every other day.     iron-vitamin C 100-250 mg (ICAR-C) 100-250 mg Tab  TAKE ONE TABLET BY MOUTH EVERY DAY     levocetirizine 5 MG tablet  Commonly known as: XYZAL  Take 1 tablet (5 mg total) by mouth every evening.     levothyroxine 112 MCG tablet  Commonly known as: SYNTHROID  Take 1 tablet (112 mcg total) by mouth once daily.     metoprolol tartrate 25 MG tablet  Commonly known as: LOPRESSOR  Take 1 tablet (25 mg total) by mouth 2 (two) times daily.     multivitamin per tablet  Commonly known as: THERAGRAN  Take 1 tablet by mouth once daily.     orphenadrine 100 mg tablet  Commonly known as: NORFLEX  Take 1 tablet (100 mg total) by mouth 2 (two) times daily.     pravastatin 40 MG tablet  Commonly known as: PRAVACHOL  Take 1 tablet (40 mg total) by mouth once daily.     pregabalin 50 MG capsule  Commonly known as: LYRICA  Take 50 mg by mouth 3 (three)  times daily.     sertraline 100 MG tablet  Commonly known as: ZOLOFT  TAKE 1 & 1/2 TABLETS BY MOUTH EVERY DAY     sodium bicarbonate 650 MG tablet  TAKE ONE TABLET BY MOUTH TWICE DAILY     traMADoL 50 mg tablet  Commonly known as: ULTRAM  Take 1 tablet (50 mg total) by mouth every 6 (six) hours as needed for Pain.     turmeric-ging-olive-oreg-capry 100 mg-150 mg- 50 mg-150 mg Cap  Take by mouth.     TYLENOL ARTHRITIS PAIN 650 MG Tbsr  Generic drug: acetaminophen  Take 650 mg by mouth every 8 (eight) hours.     ZINC ACETATE ORAL  Take 250 mg by mouth once daily.            STOP taking these medications      OSTEO BI-FLEX (5-LOXIN) 1,500-400-100 mg-unit-mg Tab  Generic drug: glucosamine-D3-Boswellia serr              Indwelling Lines/Drains at time of discharge:   Lines/Drains/Airways       None                       Time spent on the discharge of patient: 40 minutes         Cliff Berumen MD  Department of Hospital Medicine  [facility] - Telemetry ([facility])

## 2025-08-01 NOTE — PROGRESS NOTES
[facility]  Telemedicine Progress Note    Patient Name: Suyapa Connelly  MRN: [id number]  Patient Class: IP- Inpatient   Admission Date: 12/21/2021  Length of Stay: 5 days  Attending Physician: Ruben Flood MD  Primary Care Provider: Magen Christensen MD          Subjective:     Principal Problem:Osteomyelitis of right foot        HPI:  Suyapa Connelly is a 55 yo female with a pmh of DM2, PVD, osteomyelitis, right AKA, CKD3, CAD s/p CABG, afib on eliquis. She presented with right foot pain x 1 month. She has an ulcer to her right heel. Presented with dressing to right heel which was removed and had purulent foul smelling drainage noted. The dressing was not changed in 2 months since she last saw Dr. Askew on 10/22/21 for arthrectomy, thrombectomy, and PTA with DCB to right lower extremity. Had not gone to follow up appts. She now has what appears to be dry gangrene to right toes with bone exposure. She has not been compliant with medications and states she had not taken eliquis in over a month. She states she has not been able to stand due to right leg weakness since her left AKA in May 2021. She also has a pressure wound to the lateral right lower leg. She takes her 's percocet for pain, which was helping some.       Overview/Hospital Course:  No notes on file    Subjective/Interval History     Patient still with R foot pain, continuing IV abx until surgical intervention, planned for 12/28/21. Pain controlled with PRN medications. Patient with no other complaints.    Review of Systems    Constitutional: Negative for chills, fatigue, fever.   HENT: Negative for sore throat, trouble swallowing.    Eyes: Negative for photophobia, visual disturbance.   Respiratory: Negative for cough, shortness of breath.    Cardiovascular: Negative for chest pain, palpitations, leg swelling.   Gastrointestinal: Negative for abdominal pain, constipation, diarrhea, nausea, vomiting.  .v     Endocrine: Negative for cold intolerance, heat intolerance.   Genitourinary: Negative for dysuria, frequency.   Musculoskeletal: Negative for arthralgias, myalgias.   Skin: +wound, +erythema   Neurological: Negative for dizziness, syncope, weakness, light-headedness.   Psychiatric/Behavioral: Negative for confusion, hallucinations, anxiety    Medications  Scheduled Meds:   allopurinoL  100 mg Oral Daily    amLODIPine  5 mg Oral Daily    atorvastatin  80 mg Oral QHS    ceFEPime (MAXIPIME) IVPB  1 g Intravenous Q12H    EScitalopram oxalate  10 mg Oral Daily    furosemide  40 mg Oral Daily    insulin detemir U-100  5 Units Subcutaneous QHS    metroNIDAZOLE  500 mg Oral Q8H    mirtazapine  7.5 mg Oral QHS    polyethylene glycol  17 g Oral Daily    pregabalin  50 mg Oral TID    senna-docusate 8.6-50 mg  1 tablet Oral BID     Continuous Infusions:  PRN Meds:.sodium chloride, acetaminophen, dextrose 50%, dextrose 50%, glucagon (human recombinant), glucose, glucose, hydrALAZINE, HYDROmorphone, influenza, insulin aspart U-100, melatonin, mupirocin, naloxone, ondansetron, oxyCODONE-acetaminophen, pneumoc 13-azam conj-dip cr(PF), sars-cov-2 (covid-19), sodium chloride 0.9%, sodium chloride 0.9%, sodium chloride 0.9%, Pharmacy to dose Vancomycin consult **AND** vancomycin - pharmacy to dose    Objective    Physical Examination    Temp:  [97.9 °F (36.6 °C)-99.5 °F (37.5 °C)]   Pulse:  []   Resp:  [18-20]   BP: (112-170)/(66-84)   SpO2:  [93 %-99 %]     Constitutional: NAD, conversant  Head: NC, AT  Eyes: No scleral icterus.  No eye discharge  Pulmonary/Chest: Effort normal. No respiratory distress on room air  GI: No distension  Musculoskeletal: Moving all extremities  Neurological: Alert.  Oriented to person, place, and time.   Psychiatric: Normal behavior, mood, and affect  Skin: No visible erythema or cyanosis    CBC  Recent Labs   Lab 12/23/21  0604 12/24/21  0633 12/26/21  0844   WBC 11.68 16.08* 14.79*    HGB 6.5* 8.4* 8.2*   HCT 21.4* 26.7* 25.7*   * 678* 632*     CMP  Recent Labs   Lab 12/21/21  1702 12/21/21  1702 12/23/21  0604 12/24/21  0633 12/26/21  0844      < > 138 136 135*   K 4.0   < > 4.0 4.0 3.5      < > 106 103 103   CO2 24   < > 22* 21* 20*   BUN 33*   < > 26* 21* 19   CREATININE 1.6*   < > 1.3 1.3 1.2   *   < > 85 117* 86   CALCIUM 8.1*   < > 8.0* 8.4* 9.0   MG 2.1  --  1.9 1.9  --    LIPASE 8  --   --   --   --    ALKPHOS 132  --   --   --   --    ALT 7*  --   --   --   --    AST 12  --   --   --   --    ALBUMIN 1.6*  --   --   --   --    PROT 7.2  --   --   --   --    BILITOT 0.2  --   --   --   --     < > = values in this interval not displayed.           Assessment/Plan:      * Osteomyelitis of right foot  - Non-healing wounds noted with purulent drainage to heal  - Gangrenous changes to toes, exposed bone noted  - Cont vanc, cefepime, flagyl  - MRI of right foot confirms osteo  - Podiatry assessed, consutled gen sx for BKA  - ID consulted, ok to DC abx after BKA  - Hold eliquis  - Percocet PRN pain        Diabetic ulcer of right foot associated with type 2 diabetes mellitus  - See osteo      Cellulitis  - See osteo      Stage 3 chronic kidney disease due to type 2 diabetes mellitus  - At baseline  - Renally dose meds      Chronic diastolic heart failure  - Reports taking lasix daily for week prior admission  - Cont lasix daily      Chronic anemia  - Hgb drop from 13 to 7 over 2 month period  - Type and screen  - Hold plavix and eliquis (had not been taking)  - Denies acute blood loss  - sp 1 U PRBC 12/23      Critical lower limb ischemia  - Had intervention to RLE on 10/22/21  - Persistent non-healing diabetic right foot wounds   - Arterial US showing stenosis   - Cardiology following  - Plan for surgical intervention on 12/28/21  - Had not been taking eliquis, cont to hold for possible intervention      Paroxysmal atrial fibrillation  - NSR on tele  - Noncompliant  with eliquis  - Cont to hold for possible RLE intervention      Type 2 diabetes mellitus with hyperglycemia, with long-term current use of insulin  - Hold home meds  - Detemir 5u nightly  - Accuchecks and ISS  - A1C 9.6        Essential hypertension  - Started amlodipine 5mg daily  - Monitor pressures      Gangrene  - Dry gangrene right foot  - Podiatry assessed  - Gen surgery consulted for BKA, surgery on Tuesday        VTE Risk Mitigation (From admission, onward)         Ordered     Reason for No Pharmacological VTE Prophylaxis  Once        Question:  Reasons:  Answer:  Already adequately anticoagulated on oral Anticoagulants    12/21/21 1910     IP VTE HIGH RISK PATIENT  Once         12/21/21 1910                      I have assessed these finding virtually using telemed platform and with assistance of bedside nurse       The attending portion of this evaluation, treatment, and documentation was performed per Ruben Flood MD via Telemedicine AudioVisual using the secure [employer] software platform with 2 way audio/video. The provider was located off-site and the patient is located in the hospital. The aforementioned video software was utilized to document the relevant history and physical exam     Ruben Flood MD  Department of Hospital Medicine   [place] - Telemetry

## 2025-08-01 NOTE — ASSESSMENT & PLAN NOTE
Patient has permanent atrial fibrillation. Patient is currently in atrial fibrillation. [id number] Score: 4. The patients heart rate in the last 24 hours is as follows:  Pulse  Min: 59  Max: 107     Antiarrhythmics  metoprolol tartrate (LOPRESSOR) tablet 25 mg, 2 times daily, Oral    Anticoagulants  apixaban tablet 2.5 mg, 2 times daily, Oral    Plan  - Replete lytes with a goal of K>4, Mg >2  - Patient is anticoagulated, see medications listed above.  - Patient's afib is currently controlled  - Continue Eliquis and Metoprolol

## 2025-08-01 NOTE — PROGRESS NOTES
"[facility] - Telemetry ([facility])  Nephrology  Progress Note    Patient Name: Violet Swenson  MRN: [id number]  Admission Date: 7/28/2025  Hospital Length of Stay: 3 days  Attending Provider: Cliff Berumen MD   Primary Care Physician: Marti Coronel MD  Principal Problem:Acute on chronic kidney failure    Subjective:     HPI: Noted    Interval History: Pt was seen and examined. Labs and meds reviewed. Discussed with other providers.   Pt is a 84 y/o female with h/o of long-standing, chronic and intermittent diarrhea and nausea with occasional vomiting who was admitted for abnormal labs. S Cr was > 4. On IVF's s Cr has improved (2.2) and today pt is being d/ramon home. Pt has no c/o's, no current GI issues today. Pt says the GI issues are "old." No new c/o's, no new events.    Review of patient's allergies indicates:   Allergen Reactions    Corticosteroids (glucocorticoids) Nausea Only and Other (See Comments)     Stomach pain, dizziness, headache    Oxycodone Other (See Comments)     Blood pressure dropped     Current Facility-Administered Medications   Medication Frequency    acetaminophen tablet 650 mg Q8H PRN    apixaban tablet 2.5 mg BID    calcitRIOL capsule 0.25 mcg Every Mon, Wed, Fri    clopidogreL tablet 75 mg Daily    dextrose 5 % and 0.45 % NaCl infusion Continuous    hydrALAZINE injection 10 mg Q6H PRN    hydroxychloroquine tablet 200 mg Every other day    levothyroxine tablet 112 mcg Before breakfast    metoprolol tartrate (LOPRESSOR) tablet 25 mg BID    ondansetron injection 4 mg Q8H PRN    pravastatin tablet 40 mg Daily    prochlorperazine injection Soln 5 mg Q6H PRN    sertraline tablet 150 mg Daily    sodium bicarbonate tablet 650 mg BID    sodium chloride 0.9% flush 10 mL PRN    traMADoL tablet 50 mg Q12H PRN       Objective:     Vital Signs (Most Recent):  Temp: 97.8 °F (36.6 °C) (08/01/25 1225)  Pulse: 78 (08/01/25 1225)  Resp: 16 (08/01/25 1225)  BP: 136/67 (08/01/25 1225)  SpO2: 98 % " (08/01/25 1225) Vital Signs (24h Range):  Temp:  [97.3 °F (36.3 °C)-98.8 °F (37.1 °C)] 97.8 °F (36.6 °C)  Pulse:  [] 78  Resp:  [16-18] 16  SpO2:  [87 %-100 %] 98 %  BP: (128-144)/(62-73) 136/67     Weight: 65 kg (143 lb 4.8 oz) (08/01/25 0430)  Body mass index is 24.6 kg/m².  Body surface area is 1.71 meters squared.    I/O last 3 completed shifts:  In: 4359.6 [P.O.:1020; I.V.:3339.6]  Out: -      Physical Exam  Vitals and nursing note reviewed.   Constitutional:       Appearance: Normal appearance.   Cardiovascular:      Rate and Rhythm: Normal rate and regular rhythm.      Pulses: Normal pulses.      Heart sounds: Normal heart sounds.   Pulmonary:      Effort: Pulmonary effort is normal.      Breath sounds: Normal breath sounds.   Abdominal:      General: Abdomen is flat.      Tenderness: There is no abdominal tenderness.   Musculoskeletal:      Right lower leg: No edema.      Left lower leg: No edema.   Neurological:      Mental Status: She is alert and oriented to person, place, and time.   Psychiatric:         Behavior: Behavior normal.          Significant Labs: reviewed  BMP  Lab Results   Component Value Date     08/01/2025    K 5.6 (H) 08/01/2025     (H) 08/01/2025    CO2 13 (L) 08/01/2025    BUN 36 (H) 08/01/2025    CREATININE 2.2 (H) 08/01/2025    CALCIUM 7.4 (L) 08/01/2025    ANIONGAP 5 (L) 08/01/2025    EGFRNORACEVR 22 (L) 08/01/2025     Lab Results   Component Value Date    WBC 7.03 08/01/2025    HGB 9.1 (L) 08/01/2025    HCT 28.8 (L) 08/01/2025     (H) 08/01/2025     (L) 08/01/2025         Significant Imaging: reviewed      Assessment/Plan:     86 y/o female with chronic and intermittent GI losses presents with NATHALIE:    Acute on chronic kidney failure  Pt has NATHALIE on CKD stage 3.  NATHALIE is due to GI losses. Prerenal azotemia  NATHALIE is resolving  Renal function is stable.  K normal to borderline high: recommend start veltassa and d/c home with it.  Metabolic acidosis: on po  bicarbonate, continue  Na normal    Functional diarrhea  Chronic diarrhea and n/v.  Per pt, chronic issue and is intermittent  Will defer to primary team    Plans and recommendations:  As discussed above  Patient has follow-up with Dr. Goetz as outpatient  Total time spent 60 minutes including time needed to review the records, the   patient evaluation, documentation, face-to-face discussion with the patient,   more than 50% of the time was spent on coordination of care and counseling.    Level V visit.      Thank you for your consult.     Pamela Saravia MD  Nephrology  [facility] - Telemetry ([facility])

## 2025-08-01 NOTE — PT/OT/SLP PROGRESS
Physical Therapy  Treatment    Violet Swenson   MRN: [id number]   Admitting Diagnosis: Acute on chronic kidney failure       PT Start Time: 1400     PT Stop Time: 1440    PT Total Time (min): 40 min       Billable Minutes:  Gait Training 16 and Therapeutic Activity 24    Treatment Type: Treatment  PT/PTA: PT     Number of PTA visits since last PT visit: 0       General Precautions: Standard, fall  Orthopedic Precautions: N/A  Braces: N/A  Respiratory Status: Room air    Spiritual, Cultural Beliefs, Jehovah's witness Practices, Values that Affect Care: no    Subjective:  Communicated with nursing (Rin) and performed chart review via epic system prior to session.  Pt was agreeable to PT services    Pain/Comfort  Pain Rating 1: 0/10  Pain Rating Post-Intervention 1: 0/10    Objective:   Patient found with: peripheral IV, telemetry. Pt presents supine in bed    Functional Mobility:  Bed Mobility:    Supine to sit SPV   Tolerated sitting EOB x 10-15 mins with SPV    Transfers:   Sit<>stand SBA   Stand pivot SBA    Gait:    Facilitated gait activity 100 ft x 2 SBA with RW, demonstrated slow pace, flexed posture, decreased stride length    Balance:   Dynamic Sit: FAIR+: Maintains balance through MINIMAL excursions of active trunk motion  Dynamic stand: FAIR+: Needs CLOSE SUPERVISION during gait and is able to right self with minor LOB       Treatment and Education:  Educated pt on benefits of consistent participation in PT services to meet functional goals. Educated pt on call don't fall policy and use of call button to alert nursing staff of needs (including to assist in/out of bed). Pt expressed understanding.      AM-PAC 6 CLICK MOBILITY  How much help from another person does this patient currently need?   1 = Unable, Total/Dependent Assistance  2 = A lot, Maximum/Moderate Assistance  3 = A little, Minimum/Contact Guard/Supervision  4 = None, Modified Alexander/Independent    Turning over in bed (including  adjusting bedclothes, sheets and blankets)?: 3  Sitting down on and standing up from a chair with arms (e.g., wheelchair, bedside commode, etc.): 3  Moving from lying on back to sitting on the side of the bed?: 3  Moving to and from a bed to a chair (including a wheelchair)?: 3  Need to walk in hospital room?: 3  Climbing 3-5 steps with a railing?: 1  Basic Mobility Total Score: 16    AM-PAC Raw Score CMS G-Code Modifier Level of Impairment Assistance   6 % Total / Unable   7 - 9 CM 80 - 100% Maximal Assist   10 - 14 CL 60 - 80% Moderate Assist   15 - 19 CK 40 - 60% Moderate Assist   20 - 22 CJ 20 - 40% Minimal Assist   23 CI 1-20% SBA / CGA   24 CH 0% Independent/ Mod I     Patient left sitting edge of bed with all lines intact, call button in reach, and nursing notified.    Assessment:  Violet Swenson is a 85 y.o. female with a medical diagnosis of Acute on chronic kidney failure and presents with deficits in overall mobility and increased. Pt was able to tolerate gait activity with no LOB and no s/s of distress.    Rehab identified problem list/impairments: weakness, impaired endurance, impaired functional mobility, gait instability, impaired cognition, decreased coordination, decreased safety awareness, decreased lower extremity function    Rehab potential is good.    Activity tolerance: Good    Discharge recommendations: Low Intensity Therapy      Barriers to discharge:      Equipment recommendations: walker, rolling     GOALS:   Multidisciplinary Problems       Physical Therapy Goals          Problem: Physical Therapy    Goal Priority Disciplines Outcome Interventions   Physical Therapy Goal     PT, PT/OT Progressing    Description: Goals to be met by 8/12/25.  1. Pt will complete bed mobility MOD I.  2. Pt will complete sit to stand MOD I.  3. Pt will ambulate 200ft MOD I using SPC.  4. Pt will increase AMPAC score by 2 points to progress functional mobility.                         PLAN:     Patient to be seen 3 x/week to address the above listed problems via gait training, therapeutic activities, therapeutic exercises, neuromuscular re-education  Plan of Care expires: 08/12/25  Plan of Care reviewed with: patient         08/01/2025

## 2025-08-05 ENCOUNTER — TELEPHONE (OUTPATIENT)
Dept: INTERNAL MEDICINE | Facility: CLINIC | Age: 85
End: 2025-08-05
Payer: MEDICARE

## 2025-08-05 ENCOUNTER — LAB REQUISITION (OUTPATIENT)
Dept: LAB | Facility: HOSPITAL | Age: 85
End: 2025-08-05
Payer: MEDICARE

## 2025-08-05 DIAGNOSIS — Z13.6 ENCOUNTER FOR SCREENING FOR CARDIOVASCULAR DISORDERS: ICD-10-CM

## 2025-08-05 LAB
ANION GAP (OHS): 9 MMOL/L (ref 8–16)
BUN SERPL-MCNC: 33 MG/DL (ref 8–23)
CALCIUM SERPL-MCNC: 8 MG/DL (ref 8.7–10.5)
CHLORIDE SERPL-SCNC: 119 MMOL/L (ref 95–110)
CO2 SERPL-SCNC: 11 MMOL/L (ref 23–29)
CREAT SERPL-MCNC: 2.1 MG/DL (ref 0.5–1.4)
GFR SERPLBLD CREATININE-BSD FMLA CKD-EPI: 23 ML/MIN/1.73/M2
GLUCOSE SERPL-MCNC: 56 MG/DL (ref 70–110)
POTASSIUM SERPL-SCNC: 5.5 MMOL/L (ref 3.5–5.1)
SODIUM SERPL-SCNC: 139 MMOL/L (ref 136–145)

## 2025-08-05 PROCEDURE — 80048 BASIC METABOLIC PNL TOTAL CA: CPT | Mod: HCNC | Performed by: FAMILY MEDICINE

## 2025-08-05 NOTE — TELEPHONE ENCOUNTER
Copied from CRM #[id number]. Topic: General Inquiry - Patient Advice  >> Aug 5, 2025  3:13 PM Viktor wrote:  Madisyn with Home [facility] calling to inform dr Coronel that pt had a fall this morning and landed on her left arm and has a skin tear, left hand is also swollen. Call back [phone]

## 2025-08-07 DIAGNOSIS — I25.5 ISCHEMIC CARDIOMYOPATHY: Chronic | ICD-10-CM

## 2025-08-07 RX ORDER — FUROSEMIDE 40 MG/1
TABLET ORAL
Qty: 90 TABLET | Refills: 3 | Status: SHIPPED | OUTPATIENT
Start: 2025-08-07

## 2025-08-11 ENCOUNTER — DOCUMENTATION ONLY (OUTPATIENT)
Dept: CARDIOLOGY | Facility: CLINIC | Age: 85
End: 2025-08-11
Payer: MEDICARE

## 2025-08-13 ENCOUNTER — OFFICE VISIT (OUTPATIENT)
Dept: NEPHROLOGY | Facility: CLINIC | Age: 85
End: 2025-08-13
Payer: MEDICARE

## 2025-08-13 VITALS
SYSTOLIC BLOOD PRESSURE: 160 MMHG | BODY MASS INDEX: 22.81 KG/M2 | HEIGHT: 64 IN | RESPIRATION RATE: 18 BRPM | WEIGHT: 133.63 LBS | DIASTOLIC BLOOD PRESSURE: 88 MMHG | HEART RATE: 78 BPM

## 2025-08-13 DIAGNOSIS — I10 PRIMARY HYPERTENSION: ICD-10-CM

## 2025-08-13 DIAGNOSIS — E87.20 METABOLIC ACIDOSIS: ICD-10-CM

## 2025-08-13 DIAGNOSIS — N18.4 STAGE 4 CHRONIC KIDNEY DISEASE: ICD-10-CM

## 2025-08-13 DIAGNOSIS — E87.5 HYPERKALEMIA: ICD-10-CM

## 2025-08-13 DIAGNOSIS — N17.9 AKI (ACUTE KIDNEY INJURY): Primary | ICD-10-CM

## 2025-08-13 PROCEDURE — 1159F MED LIST DOCD IN RCRD: CPT | Mod: CPTII,HCNC,S$GLB, | Performed by: INTERNAL MEDICINE

## 2025-08-13 PROCEDURE — 1100F PTFALLS ASSESS-DOCD GE2>/YR: CPT | Mod: CPTII,HCNC,S$GLB, | Performed by: INTERNAL MEDICINE

## 2025-08-13 PROCEDURE — 3079F DIAST BP 80-89 MM HG: CPT | Mod: CPTII,HCNC,S$GLB, | Performed by: INTERNAL MEDICINE

## 2025-08-13 PROCEDURE — 3077F SYST BP >= 140 MM HG: CPT | Mod: CPTII,HCNC,S$GLB, | Performed by: INTERNAL MEDICINE

## 2025-08-13 PROCEDURE — 1126F AMNT PAIN NOTED NONE PRSNT: CPT | Mod: CPTII,HCNC,S$GLB, | Performed by: INTERNAL MEDICINE

## 2025-08-13 PROCEDURE — 1160F RVW MEDS BY RX/DR IN RCRD: CPT | Mod: CPTII,HCNC,S$GLB, | Performed by: INTERNAL MEDICINE

## 2025-08-13 PROCEDURE — 99215 OFFICE O/P EST HI 40 MIN: CPT | Mod: HCNC,S$GLB,, | Performed by: INTERNAL MEDICINE

## 2025-08-13 PROCEDURE — 3288F FALL RISK ASSESSMENT DOCD: CPT | Mod: CPTII,HCNC,S$GLB, | Performed by: INTERNAL MEDICINE

## 2025-08-13 PROCEDURE — 1157F ADVNC CARE PLAN IN RCRD: CPT | Mod: CPTII,HCNC,S$GLB, | Performed by: INTERNAL MEDICINE

## 2025-08-13 PROCEDURE — 1111F DSCHRG MED/CURRENT MED MERGE: CPT | Mod: CPTII,HCNC,S$GLB, | Performed by: INTERNAL MEDICINE

## 2025-08-13 PROCEDURE — 99999 PR PBB SHADOW E&M-EST. PATIENT-LVL IV: CPT | Mod: PBBFAC,HCNC,, | Performed by: INTERNAL MEDICINE

## 2025-08-14 ENCOUNTER — OUTPATIENT CASE MANAGEMENT (OUTPATIENT)
Dept: ADMINISTRATIVE | Facility: OTHER | Age: 85
End: 2025-08-14
Payer: MEDICARE

## 2025-08-18 ENCOUNTER — OFFICE VISIT (OUTPATIENT)
Dept: OPHTHALMOLOGY | Facility: CLINIC | Age: 85
End: 2025-08-18
Payer: MEDICARE

## 2025-08-18 DIAGNOSIS — M15.0 PRIMARY OSTEOARTHRITIS INVOLVING MULTIPLE JOINTS: Primary | Chronic | ICD-10-CM

## 2025-08-18 DIAGNOSIS — Z79.899 LONG-TERM USE OF PLAQUENIL: ICD-10-CM

## 2025-08-18 DIAGNOSIS — H25.13 CATARACT, NUCLEAR SCLEROTIC SENILE, BILATERAL: ICD-10-CM

## 2025-08-18 DIAGNOSIS — H52.7 REFRACTIVE ERRORS: ICD-10-CM

## 2025-08-18 PROCEDURE — 92083 EXTENDED VISUAL FIELD XM: CPT | Mod: HCNC,S$GLB,, | Performed by: OPTOMETRIST

## 2025-08-18 PROCEDURE — 1157F ADVNC CARE PLAN IN RCRD: CPT | Mod: CPTII,HCNC,S$GLB, | Performed by: OPTOMETRIST

## 2025-08-18 PROCEDURE — 92134 CPTRZ OPH DX IMG PST SGM RTA: CPT | Mod: HCNC,S$GLB,, | Performed by: OPTOMETRIST

## 2025-08-18 PROCEDURE — 99999 PR PBB SHADOW E&M-EST. PATIENT-LVL IV: CPT | Mod: PBBFAC,HCNC,, | Performed by: OPTOMETRIST

## 2025-08-18 PROCEDURE — 1159F MED LIST DOCD IN RCRD: CPT | Mod: CPTII,HCNC,S$GLB, | Performed by: OPTOMETRIST

## 2025-08-18 PROCEDURE — 92015 DETERMINE REFRACTIVE STATE: CPT | Mod: HCNC,S$GLB,, | Performed by: OPTOMETRIST

## 2025-08-18 PROCEDURE — 92014 COMPRE OPH EXAM EST PT 1/>: CPT | Mod: HCNC,S$GLB,, | Performed by: OPTOMETRIST

## 2025-08-21 ENCOUNTER — TELEPHONE (OUTPATIENT)
Dept: INTERNAL MEDICINE | Facility: CLINIC | Age: 85
End: 2025-08-21
Payer: MEDICARE

## 2025-08-22 ENCOUNTER — OFFICE VISIT (OUTPATIENT)
Dept: INTERNAL MEDICINE | Facility: CLINIC | Age: 85
End: 2025-08-22
Payer: MEDICARE

## 2025-08-22 ENCOUNTER — LAB VISIT (OUTPATIENT)
Dept: LAB | Facility: HOSPITAL | Age: 85
End: 2025-08-22
Attending: PHYSICIAN ASSISTANT
Payer: MEDICARE

## 2025-08-22 ENCOUNTER — TELEPHONE (OUTPATIENT)
Dept: CARDIOLOGY | Facility: CLINIC | Age: 85
End: 2025-08-22
Payer: MEDICARE

## 2025-08-22 VITALS
OXYGEN SATURATION: 97 % | WEIGHT: 130.06 LBS | TEMPERATURE: 99 F | SYSTOLIC BLOOD PRESSURE: 138 MMHG | HEART RATE: 70 BPM | RESPIRATION RATE: 16 BRPM | DIASTOLIC BLOOD PRESSURE: 72 MMHG | BODY MASS INDEX: 22.33 KG/M2

## 2025-08-22 DIAGNOSIS — E83.39 HYPERPHOSPHATEMIA: ICD-10-CM

## 2025-08-22 DIAGNOSIS — E87.5 HYPERKALEMIA: ICD-10-CM

## 2025-08-22 DIAGNOSIS — Z09 HOSPITAL DISCHARGE FOLLOW-UP: Primary | ICD-10-CM

## 2025-08-22 DIAGNOSIS — Z98.84 S/P GASTRIC BYPASS: Chronic | ICD-10-CM

## 2025-08-22 DIAGNOSIS — N18.4 STAGE 4 CHRONIC KIDNEY DISEASE: ICD-10-CM

## 2025-08-22 DIAGNOSIS — K59.1 FUNCTIONAL DIARRHEA: ICD-10-CM

## 2025-08-22 DIAGNOSIS — J30.9 ALLERGIC RHINITIS, UNSPECIFIED SEASONALITY, UNSPECIFIED TRIGGER: ICD-10-CM

## 2025-08-22 LAB
ALBUMIN SERPL BCP-MCNC: 3.7 G/DL (ref 3.5–5.2)
ALP SERPL-CCNC: 43 UNIT/L (ref 40–150)
ALT SERPL W/O P-5'-P-CCNC: 28 UNIT/L (ref 0–55)
ANION GAP (OHS): 10 MMOL/L (ref 8–16)
AST SERPL-CCNC: 31 UNIT/L (ref 0–50)
BILIRUB SERPL-MCNC: 0.3 MG/DL (ref 0.1–1)
BUN SERPL-MCNC: 33 MG/DL (ref 8–23)
CALCIUM SERPL-MCNC: 7.8 MG/DL (ref 8.7–10.5)
CHLORIDE SERPL-SCNC: 120 MMOL/L (ref 95–110)
CO2 SERPL-SCNC: 12 MMOL/L (ref 23–29)
CREAT SERPL-MCNC: 1.9 MG/DL (ref 0.5–1.4)
GFR SERPLBLD CREATININE-BSD FMLA CKD-EPI: 26 ML/MIN/1.73/M2
GLUCOSE SERPL-MCNC: 90 MG/DL (ref 70–110)
POTASSIUM SERPL-SCNC: 3.5 MMOL/L (ref 3.5–5.1)
PROT SERPL-MCNC: 6.4 GM/DL (ref 6–8.4)
SODIUM SERPL-SCNC: 142 MMOL/L (ref 136–145)

## 2025-08-22 PROCEDURE — 99999 PR PBB SHADOW E&M-EST. PATIENT-LVL IV: CPT | Mod: PBBFAC,HCNC,, | Performed by: PHYSICIAN ASSISTANT

## 2025-08-22 PROCEDURE — 82040 ASSAY OF SERUM ALBUMIN: CPT | Mod: HCNC

## 2025-08-22 PROCEDURE — 36415 COLL VENOUS BLD VENIPUNCTURE: CPT | Mod: HCNC

## 2025-08-22 RX ORDER — LEVOCETIRIZINE DIHYDROCHLORIDE 5 MG/1
5 TABLET, FILM COATED ORAL NIGHTLY
Qty: 90 TABLET | Refills: 3 | Status: SHIPPED | OUTPATIENT
Start: 2025-08-22

## 2025-08-25 ENCOUNTER — OFFICE VISIT (OUTPATIENT)
Dept: CARDIOLOGY | Facility: CLINIC | Age: 85
End: 2025-08-25
Payer: MEDICARE

## 2025-08-25 VITALS
SYSTOLIC BLOOD PRESSURE: 154 MMHG | HEART RATE: 60 BPM | HEIGHT: 64 IN | WEIGHT: 135.13 LBS | BODY MASS INDEX: 23.07 KG/M2 | DIASTOLIC BLOOD PRESSURE: 70 MMHG

## 2025-08-25 DIAGNOSIS — I10 ESSENTIAL HYPERTENSION: Chronic | ICD-10-CM

## 2025-08-25 DIAGNOSIS — I25.118 ATHEROSCLEROSIS OF NATIVE CORONARY ARTERY OF NATIVE HEART WITH STABLE ANGINA PECTORIS: Primary | ICD-10-CM

## 2025-08-25 PROCEDURE — 3077F SYST BP >= 140 MM HG: CPT | Mod: CPTII,HCNC,S$GLB, | Performed by: PHYSICIAN ASSISTANT

## 2025-08-25 PROCEDURE — 1111F DSCHRG MED/CURRENT MED MERGE: CPT | Mod: CPTII,HCNC,S$GLB, | Performed by: PHYSICIAN ASSISTANT

## 2025-08-25 PROCEDURE — 99214 OFFICE O/P EST MOD 30 MIN: CPT | Mod: HCNC,S$GLB,, | Performed by: PHYSICIAN ASSISTANT

## 2025-08-25 PROCEDURE — 1126F AMNT PAIN NOTED NONE PRSNT: CPT | Mod: CPTII,HCNC,S$GLB, | Performed by: PHYSICIAN ASSISTANT

## 2025-08-25 PROCEDURE — 1157F ADVNC CARE PLAN IN RCRD: CPT | Mod: CPTII,HCNC,S$GLB, | Performed by: PHYSICIAN ASSISTANT

## 2025-08-25 PROCEDURE — 1159F MED LIST DOCD IN RCRD: CPT | Mod: CPTII,HCNC,S$GLB, | Performed by: PHYSICIAN ASSISTANT

## 2025-08-25 PROCEDURE — 1160F RVW MEDS BY RX/DR IN RCRD: CPT | Mod: CPTII,HCNC,S$GLB, | Performed by: PHYSICIAN ASSISTANT

## 2025-08-25 PROCEDURE — 3078F DIAST BP <80 MM HG: CPT | Mod: CPTII,HCNC,S$GLB, | Performed by: PHYSICIAN ASSISTANT

## 2025-08-25 PROCEDURE — 99999 PR PBB SHADOW E&M-EST. PATIENT-LVL IV: CPT | Mod: PBBFAC,HCNC,, | Performed by: PHYSICIAN ASSISTANT

## 2025-08-27 ENCOUNTER — OUTPATIENT CASE MANAGEMENT (OUTPATIENT)
Dept: ADMINISTRATIVE | Facility: OTHER | Age: 85
End: 2025-08-27
Payer: MEDICARE

## 2025-08-30 ENCOUNTER — EXTERNAL HOME HEALTH (OUTPATIENT)
Dept: HOME HEALTH SERVICES | Facility: HOSPITAL | Age: 85
End: 2025-08-30
Payer: MEDICARE

## 2025-09-03 ENCOUNTER — OFFICE VISIT (OUTPATIENT)
Dept: SPORTS MEDICINE | Facility: CLINIC | Age: 85
End: 2025-09-03
Payer: MEDICARE

## 2025-09-03 DIAGNOSIS — M75.101 RIGHT ROTATOR CUFF TEAR ARTHROPATHY: Primary | ICD-10-CM

## 2025-09-03 DIAGNOSIS — M12.811 RIGHT ROTATOR CUFF TEAR ARTHROPATHY: Primary | ICD-10-CM

## 2025-09-03 DIAGNOSIS — M75.102 LEFT ROTATOR CUFF TEAR ARTHROPATHY: ICD-10-CM

## 2025-09-03 DIAGNOSIS — M12.812 LEFT ROTATOR CUFF TEAR ARTHROPATHY: ICD-10-CM

## 2025-09-03 PROCEDURE — 99999 PR PBB SHADOW E&M-EST. PATIENT-LVL IV: CPT | Mod: PBBFAC,HCNC,, | Performed by: PHYSICIAN ASSISTANT

## 2025-09-03 RX ORDER — TRAMADOL HYDROCHLORIDE 50 MG/1
50 TABLET, FILM COATED ORAL EVERY 6 HOURS PRN
Qty: 36 TABLET | Refills: 0 | Status: SHIPPED | OUTPATIENT
Start: 2025-09-03

## 2025-09-03 RX ORDER — METHYLPREDNISOLONE ACETATE 80 MG/ML
80 INJECTION, SUSPENSION INTRA-ARTICULAR; INTRALESIONAL; INTRAMUSCULAR; SOFT TISSUE
Status: DISCONTINUED | OUTPATIENT
Start: 2025-09-03 | End: 2025-09-03 | Stop reason: HOSPADM

## 2025-09-03 RX ORDER — METHYLPREDNISOLONE ACETATE 80 MG/ML
40 INJECTION, SUSPENSION INTRA-ARTICULAR; INTRALESIONAL; INTRAMUSCULAR; SOFT TISSUE
Status: DISCONTINUED | OUTPATIENT
Start: 2025-09-03 | End: 2025-09-03 | Stop reason: HOSPADM

## 2025-09-03 RX ORDER — CALCITRIOL 0.25 UG/1
CAPSULE ORAL
Qty: 12 CAPSULE | Refills: 3 | Status: SHIPPED | OUTPATIENT
Start: 2025-09-03

## 2025-09-03 RX ADMIN — METHYLPREDNISOLONE ACETATE 40 MG: 80 INJECTION, SUSPENSION INTRA-ARTICULAR; INTRALESIONAL; INTRAMUSCULAR; SOFT TISSUE at 11:09

## 2025-09-03 RX ADMIN — METHYLPREDNISOLONE ACETATE 80 MG: 80 INJECTION, SUSPENSION INTRA-ARTICULAR; INTRALESIONAL; INTRAMUSCULAR; SOFT TISSUE at 11:09

## 2025-09-04 ENCOUNTER — OUTPATIENT CASE MANAGEMENT (OUTPATIENT)
Dept: ADMINISTRATIVE | Facility: OTHER | Age: 85
End: 2025-09-04
Payer: MEDICARE

## 2025-09-05 DIAGNOSIS — D50.8 OTHER IRON DEFICIENCY ANEMIA: ICD-10-CM

## 2025-09-06 RX ORDER — IRON,CARBONYL/ASCORBIC ACID 100-250 MG
1 TABLET ORAL
Qty: 30 TABLET | Refills: 4 | Status: SHIPPED | OUTPATIENT
Start: 2025-09-06

## (undated) DEVICE — SYR 10CC LUER LOCK

## (undated) DEVICE — GAUZE SPONGE 4X4 12PLY

## (undated) DEVICE — SHEET THYROID W/ISO-BAC

## (undated) DEVICE — SYR BULB 60CC IRRIGATION

## (undated) DEVICE — COVER OVERHEAD SURG LT BLUE

## (undated) DEVICE — DECANTER VIAL ASEPTIC TRANSFER

## (undated) DEVICE — ADHESIVE DERMABOND ADVANCED

## (undated) DEVICE — SEE MEDLINE ITEM 152622

## (undated) DEVICE — ELECTRODE BLADE E-Z CLEAN 4IN

## (undated) DEVICE — SUT VICRYL PLUS 3-0 SH 18IN

## (undated) DEVICE — CONTAINER SPECIMEN STRL 4OZ

## (undated) DEVICE — POSITIONER HEAD DONUT 9IN FOAM

## (undated) DEVICE — SUPPORT ULNA NERVE PROTECTOR

## (undated) DEVICE — SOL 9P NACL IRR PIC IL

## (undated) DEVICE — NDL SAFETY 25G X 1.5 ECLIPSE

## (undated) DEVICE — SHEATH INTRODUCER 6FR 11CM

## (undated) DEVICE — ADHESIVE MASTISOL VIAL 48/BX

## (undated) DEVICE — ELECTRODE REM PLYHSV RETURN 9

## (undated) DEVICE — MANIFOLD 4 PORT

## (undated) DEVICE — CATH SUPREME QPLR CRD-2 6F 120

## (undated) DEVICE — SHEATH SAFE ULTRA 9FR

## (undated) DEVICE — UNDERGLOVES BIOGEL PI SZ 7 LF

## (undated) DEVICE — SUT MONOCRYL 4.0 PS2 CP496G

## (undated) DEVICE — DRAPE MOBILE C-ARM

## (undated) DEVICE — DRESSING TRANS 4X4 TEGADERM

## (undated) DEVICE — SCALPEL SZ 10 RETRACTABLE

## (undated) DEVICE — PAD GROUNDING DISPER ELECTRODE

## (undated) DEVICE — SEE MEDLINE ITEM 157027

## (undated) DEVICE — SPONGE DERMACEA 4X4IN 12PLY

## (undated) DEVICE — OMNIPAQUE 300MG 150ML VIAL

## (undated) DEVICE — APPLICATOR CHLORAPREP ORN 26ML

## (undated) DEVICE — BLADE PLASMA WIDE SPATULA TIP

## (undated) DEVICE — Device

## (undated) DEVICE — NDL PERCUTANEOUS 21G 7CM VASC

## (undated) DEVICE — DRESSING AQUACEL AG ADV 3.5X6

## (undated) DEVICE — GLOVE SURGICAL LATEX SZ 7

## (undated) DEVICE — SPONGE LAP 18X18 PREWASHED

## (undated) DEVICE — PAD DEFIB CADENCE ADULT R2

## (undated) DEVICE — SEE MEDLINE ITEM 157148

## (undated) DEVICE — SUT VICRYL CTD 2-0 GI 27 SH

## (undated) DEVICE — CLOSURE SKIN STERI STRIP 1/2X4

## (undated) DEVICE — SUT 1 36IN PDS II VIO MONO

## (undated) DEVICE — SUT SILK 3-0 SH 18IN BLACK

## (undated) DEVICE — CUTTER PROXIMATE BLUE 75MM

## (undated) DEVICE — GLOVE SURG BIOGEL LATEX SZ 7.5

## (undated) DEVICE — SEE MEDLINE ITEM 152739

## (undated) DEVICE — DEVICE ENSEAL X1 LARGE JAW

## (undated) DEVICE — KIT SELECTRA ACCESSORY

## (undated) DEVICE — CAUTERY BOVIE PENCIL

## (undated) DEVICE — GUIDEWIRE .035 175CM VERSACORE

## (undated) DEVICE — GUIDEWIRE STF .035X180CM STR

## (undated) DEVICE — NDL PERC ENTRY BSDN 18-7.0

## (undated) DEVICE — SEE MEDLINE ITEM 157117

## (undated) DEVICE — STAPLER SKIN PROXIMATE WIDE

## (undated) DEVICE — RELOAD PROXIMATE CUT BLUE 75MM

## (undated) DEVICE — SET DECANTER MEDICHOICE

## (undated) DEVICE — CONTRAST OMNIPAQUE 240 50ML

## (undated) DEVICE — PACK PACER PERMANENT OMC

## (undated) DEVICE — SUT 3-0 VICRYL / SH (J416)

## (undated) DEVICE — SUT VICRYL 3-0 27 SH

## (undated) DEVICE — SELECTRA 3D-55-42

## (undated) DEVICE — SUT VICRYL 2-0 CT-2 VCP269H

## (undated) DEVICE — SUT 1 48IN PDS II VIO MONO

## (undated) DEVICE — SUT 4/0 18IN COATED VICRYL